# Patient Record
Sex: MALE | Race: OTHER | NOT HISPANIC OR LATINO | URBAN - METROPOLITAN AREA
[De-identification: names, ages, dates, MRNs, and addresses within clinical notes are randomized per-mention and may not be internally consistent; named-entity substitution may affect disease eponyms.]

---

## 2021-10-07 ENCOUNTER — INPATIENT (INPATIENT)
Facility: HOSPITAL | Age: 41
LOS: 0 days | Discharge: HOME | DRG: 812 | End: 2021-10-08
Attending: INTERNAL MEDICINE | Admitting: INTERNAL MEDICINE
Payer: MEDICAID

## 2021-10-07 VITALS
OXYGEN SATURATION: 89 % | TEMPERATURE: 98 F | DIASTOLIC BLOOD PRESSURE: 80 MMHG | RESPIRATION RATE: 17 BRPM | WEIGHT: 195.11 LBS | HEART RATE: 98 BPM | HEIGHT: 69 IN | SYSTOLIC BLOOD PRESSURE: 139 MMHG

## 2021-10-07 DIAGNOSIS — D57.1 SICKLE-CELL DISEASE WITHOUT CRISIS: ICD-10-CM

## 2021-10-07 LAB
ALBUMIN SERPL ELPH-MCNC: 3.3 G/DL — LOW (ref 3.5–5)
ALP SERPL-CCNC: 139 U/L — HIGH (ref 40–120)
ALT FLD-CCNC: 63 U/L DA — HIGH (ref 10–60)
ANION GAP SERPL CALC-SCNC: 6 MMOL/L — SIGNIFICANT CHANGE UP (ref 5–17)
AST SERPL-CCNC: 124 U/L — HIGH (ref 10–40)
BASOPHILS # BLD AUTO: 0.09 K/UL — SIGNIFICANT CHANGE UP (ref 0–0.2)
BASOPHILS NFR BLD AUTO: 1.2 % — SIGNIFICANT CHANGE UP (ref 0–2)
BILIRUB SERPL-MCNC: 7.7 MG/DL — HIGH (ref 0.2–1.2)
BUN SERPL-MCNC: 20 MG/DL — HIGH (ref 7–18)
CALCIUM SERPL-MCNC: 8.7 MG/DL — SIGNIFICANT CHANGE UP (ref 8.4–10.5)
CHLORIDE SERPL-SCNC: 109 MMOL/L — HIGH (ref 96–108)
CO2 SERPL-SCNC: 25 MMOL/L — SIGNIFICANT CHANGE UP (ref 22–31)
CREAT SERPL-MCNC: 1.08 MG/DL — SIGNIFICANT CHANGE UP (ref 0.5–1.3)
EOSINOPHIL # BLD AUTO: 0.22 K/UL — SIGNIFICANT CHANGE UP (ref 0–0.5)
EOSINOPHIL NFR BLD AUTO: 2.9 % — SIGNIFICANT CHANGE UP (ref 0–6)
GLUCOSE SERPL-MCNC: 92 MG/DL — SIGNIFICANT CHANGE UP (ref 70–99)
HCT VFR BLD CALC: 13.1 % — CRITICAL LOW (ref 39–50)
HGB BLD-MCNC: 4.8 G/DL — CRITICAL LOW (ref 13–17)
IMM GRANULOCYTES NFR BLD AUTO: 0.7 % — SIGNIFICANT CHANGE UP (ref 0–1.5)
LYMPHOCYTES # BLD AUTO: 3.03 K/UL — SIGNIFICANT CHANGE UP (ref 1–3.3)
LYMPHOCYTES # BLD AUTO: 39.7 % — SIGNIFICANT CHANGE UP (ref 13–44)
MCHC RBC-ENTMCNC: 35.6 PG — HIGH (ref 27–34)
MCHC RBC-ENTMCNC: 36.6 GM/DL — HIGH (ref 32–36)
MCV RBC AUTO: 97 FL — SIGNIFICANT CHANGE UP (ref 80–100)
MONOCYTES # BLD AUTO: 1.21 K/UL — HIGH (ref 0–0.9)
MONOCYTES NFR BLD AUTO: 15.9 % — HIGH (ref 2–14)
NEUTROPHILS # BLD AUTO: 3.03 K/UL — SIGNIFICANT CHANGE UP (ref 1.8–7.4)
NEUTROPHILS NFR BLD AUTO: 39.6 % — LOW (ref 43–77)
NRBC # BLD: 2 /100 WBCS — HIGH (ref 0–0)
PLATELET # BLD AUTO: 201 K/UL — SIGNIFICANT CHANGE UP (ref 150–400)
POTASSIUM SERPL-MCNC: 4.3 MMOL/L — SIGNIFICANT CHANGE UP (ref 3.5–5.3)
POTASSIUM SERPL-SCNC: 4.3 MMOL/L — SIGNIFICANT CHANGE UP (ref 3.5–5.3)
PROT SERPL-MCNC: 6.9 G/DL — SIGNIFICANT CHANGE UP (ref 6–8.3)
RBC # BLD: 1.35 M/UL — LOW (ref 4.2–5.8)
RBC # FLD: 22.4 % — HIGH (ref 10.3–14.5)
SARS-COV-2 RNA SPEC QL NAA+PROBE: SIGNIFICANT CHANGE UP
SODIUM SERPL-SCNC: 140 MMOL/L — SIGNIFICANT CHANGE UP (ref 135–145)
WBC # BLD: 7.63 K/UL — SIGNIFICANT CHANGE UP (ref 3.8–10.5)
WBC # FLD AUTO: 7.63 K/UL — SIGNIFICANT CHANGE UP (ref 3.8–10.5)

## 2021-10-07 PROCEDURE — 99285 EMERGENCY DEPT VISIT HI MDM: CPT

## 2021-10-07 PROCEDURE — 93970 EXTREMITY STUDY: CPT | Mod: 26

## 2021-10-07 PROCEDURE — 71046 X-RAY EXAM CHEST 2 VIEWS: CPT | Mod: 26

## 2021-10-07 PROCEDURE — 99223 1ST HOSP IP/OBS HIGH 75: CPT

## 2021-10-07 RX ORDER — OXYCODONE HYDROCHLORIDE 5 MG/1
30 TABLET ORAL EVERY 4 HOURS
Refills: 0 | Status: DISCONTINUED | OUTPATIENT
Start: 2021-10-07 | End: 2021-10-08

## 2021-10-07 RX ORDER — OXYCODONE HYDROCHLORIDE 5 MG/1
30 TABLET ORAL ONCE
Refills: 0 | Status: DISCONTINUED | OUTPATIENT
Start: 2021-10-07 | End: 2021-10-07

## 2021-10-07 RX ADMIN — OXYCODONE HYDROCHLORIDE 30 MILLIGRAM(S): 5 TABLET ORAL at 18:35

## 2021-10-07 RX ADMIN — OXYCODONE HYDROCHLORIDE 30 MILLIGRAM(S): 5 TABLET ORAL at 18:05

## 2021-10-07 RX ADMIN — OXYCODONE HYDROCHLORIDE 30 MILLIGRAM(S): 5 TABLET ORAL at 22:06

## 2021-10-07 NOTE — H&P ADULT - NSHPREVIEWOFSYSTEMS_GEN_ALL_CORE
REVIEW OF SYSTEMS:  CONSTITUTIONAL: No fever, weight loss, or fatigue  RESPIRATORY: No cough, wheezing, chills or hemoptysis; No shortness of breath  CARDIOVASCULAR: No chest pain, palpitations, dizziness, or leg swelling  GASTROINTESTINAL: No abdominal pain. No nausea, vomiting, or hematemesis; No diarrhea or constipation. No melena or hematochezia.  GENITOURINARY: No dysuria or hematuria, urinary frequency  NEUROLOGICAL: No headaches, memory loss, loss of strength, numbness, or tremors  SKIN: No itching, burning, rashes, or lesions REVIEW OF SYSTEMS:  CONSTITUTIONAL: No fever, weight loss, + fatigue  RESPIRATORY: No cough, wheezing, chills or hemoptysis; No shortness of breath  CARDIOVASCULAR: No chest pain, palpitations, dizziness, or leg swelling  GASTROINTESTINAL: No abdominal pain. No nausea, vomiting, or hematemesis; No diarrhea or constipation. No melena or hematochezia.  GENITOURINARY: No dysuria or hematuria, urinary frequency  NEUROLOGICAL: No headaches, memory loss, loss of strength, numbness, or tremors  SKIN: + jaundice. No itching, burning, rashes, or lesions

## 2021-10-07 NOTE — H&P ADULT - PROBLEM SELECTOR PLAN 1
sent in for low Hb, in ED was 4.8 w/ diffuse jaundice, icterus  bili 7.7  Trop negative  - transfuse 2-3 units. 1 unit PRBC in ED   - pre-medicate: IV tylenol, benadryl IVP 50 and decadron 8 IVP  - trend Hb post transfusions

## 2021-10-07 NOTE — ED PROVIDER NOTE - CLINICAL SUMMARY MEDICAL DECISION MAKING FREE TEXT BOX
No urinary symptoms or CVAT to suggest pyelo/stone. No fever. Character and appearance low suspicion for dissection. No abd pain/tenderness with low suspicion for acute process. Symm LE edema, lungs clear and no SOB/WOB to suggest pulm edema or DVT/PE. No urinary symptoms or CVAT to suggest pyelo/stone. No fever. Character and appearance low suspicion for dissection. No abd pain/tenderness with low suspicion for acute process. Symm LE edema, lungs clear and no SOB/WOB to suggest pulm edema or DVT/PE.      D/w Dr. Jordan on the phone, in light of 4.8 still wants only 2u PRBC as pt has this at baseline, also bili (8.6 in August)/other labs at baseline, confirms dose of oxycodone though q4-6 hrs. No urinary symptoms or CVAT to suggest pyelo/stone. No fever. Character and appearance low suspicion for dissection. No abd pain/tenderness with low suspicion for acute process. Symm LE edema, lungs clear and no SOB/WOB to suggest pulm edema or DVT/PE. No e/o DVT on US. No CP/SOB/cough/fever and no e/o acute chest. Hypoxia may be 2/2 anemia. No acute bleeding. D/w Dr. Jordan on the phone, in light of 4.8 still wants only 2u PRBC as pt has this at baseline, also bili (8.6 in August)/other labs at baseline, confirms dose of oxycodone though q4-6 hrs. Patient well appearing, hemodynamically stable, walking around ED, no WOB. Awaiting prolonged blood 2/2 ab's. Admitted to internal medicine for further monitoring, w/u, and care.

## 2021-10-07 NOTE — ED PROVIDER NOTE - CARE PLAN
1 Principal Discharge DX:	Sickle cell anemia  Secondary Diagnosis:	Back pain   Principal Discharge DX:	Sickle cell anemia  Secondary Diagnosis:	Chronic midline low back pain  Secondary Diagnosis:	Hypoxia

## 2021-10-07 NOTE — ED PROVIDER NOTE - OBJECTIVE STATEMENT
41yoM with h/o HbSS, presents with rx by Dr. Adrian Jordan with rx for 2u PRBC and premedication with Tylenol, benadryl, decadron (which I viewed at bedside). Pt confirms he has h/o multiple blood transfusions 2/2 his sickle cell dz. Reports midline low back pain, entirely identical to his usual chronic pain for which he takes 30mg oxycodone q3hrs. Denies fever, v/d, abd pain, urinary symptoms, fall/trauma, bleeding source, and all other symptoms.

## 2021-10-07 NOTE — H&P ADULT - HISTORY OF PRESENT ILLNESS
42 yo M Hx sickle cell anemia, anemia requiring blood transfusions (last in April 2021) and Gout who was sent in by Dr. Jordan for low blood Hb level. Patient reports few days of fatigue and shortness of breath on exertion. He waited till his visit to Dr. Jordan to be evaluated for his symptoms. Reports that he usually gets monthly transfusions but has been transfusion - free over the summer since April. Takes Allopurinol, Colchicine, Folic Acid, Oxycodone, Omeprazole and is planned to start "Oxbrita" soon. Denies any infectious symptoms or blood in stool or urine.

## 2021-10-07 NOTE — H&P ADULT - NSHPPHYSICALEXAM_GEN_ALL_CORE
PHYSICAL EXAMINATION:  GENERAL: NAD, well built  HEAD:  Atraumatic, Normocephalic  EYES:  conjunctiva and sclera clear  NECK: Supple, No JVD, Normal thyroid  CHEST/LUNG: Clear to auscultation. Clear to percussion bilaterally; No rales, rhonchi, wheezing, or rubs  HEART: Regular rate and rhythm; No murmurs, rubs, or gallops  ABDOMEN: Soft, Nontender, Nondistended; Bowel sounds present  NERVOUS SYSTEM:  Alert & Oriented X3,    EXTREMITIES:  2+ Peripheral Pulses, No clubbing, cyanosis, or edema  SKIN: warm dry PHYSICAL EXAMINATION:  GENERAL: NAD, on 4L NC  HEAD:  Atraumatic, Normocephalic  EYES:  conjunctiva and sclera icteric   NECK: Supple, No JVD, Normal thyroid  CHEST/LUNG: Clear to auscultation. Clear to percussion bilaterally; No rales, rhonchi, wheezing, or rubs  HEART: tachy rate and rhythm; No rubs, or gallops  ABDOMEN: Soft, Nontender, Nondistended; Bowel sounds present  NERVOUS SYSTEM:  Alert & Oriented X3,    EXTREMITIES:  2+ Peripheral Pulses, No clubbing, cyanosis, b/l LE edema  SKIN: warm dry, jaundiced

## 2021-10-07 NOTE — H&P ADULT - NSHPLABSRESULTS_GEN_ALL_CORE
LABS:                        4.8    7.63  )-----------( 201      ( 07 Oct 2021 18:02 )             13.1     10-07    140  |  109<H>  |  20<H>  ----------------------------<  92  4.3   |  25  |  1.08    Ca    8.7      07 Oct 2021 18:02    TPro  6.9  /  Alb  3.3<L>  /  TBili  7.7<H>  /  DBili  x   /  AST  124<H>  /  ALT  63<H>  /  AlkPhos  139<H>  10-07        LIVER FUNCTIONS - ( 07 Oct 2021 18:02 )  Alb: 3.3 g/dL / Pro: 6.9 g/dL / ALK PHOS: 139 U/L / ALT: 63 U/L DA / AST: 124 U/L / GGT: x LABS:                        4.8    7.63  )-----------( 201      ( 07 Oct 2021 18:02 )             13.1     10-07    140  |  109<H>  |  20<H>  ----------------------------<  92  4.3   |  25  |  1.08    Ca    8.7      07 Oct 2021 18:02    TPro  6.9  /  Alb  3.3<L>  /  TBili  7.7<H>  /  DBili  x   /  AST  124<H>  /  ALT  63<H>  /  AlkPhos  139<H>  10-07    LIVER FUNCTIONS - ( 07 Oct 2021 18:02 )  Alb: 3.3 g/dL / Pro: 6.9 g/dL / ALK PHOS: 139 U/L / ALT: 63 U/L DA / AST: 124 U/L / GGT: x

## 2021-10-07 NOTE — ED ADULT NURSE NOTE - OBJECTIVE STATEMENT
Pt arrived from home , drove self for low HGB  Pt appears jaundiced , c/o pain all over the body , states has a Hx of sickle cell

## 2021-10-07 NOTE — ED PROVIDER NOTE - PHYSICAL EXAMINATION
Afebrile, hemodynamically stable, saturating well  NAD, well appearing, sitting comfortably in bed, no WOB, speaking full sentences  Head NCAT  EOMI grossly, icteric  MMM  No JVD  RRR, nml S1/S2, no m/r/g  Lungs CTAB, no w/r/r  Abd soft, NT, ND, nml BS, no rebound or guarding  No back stepoff/deformity/erythema  AAO, CN's 3-12 grossly intact  RICHARDSON spontaneously, b/l symm LE edema baseline per pt  Skin warm, well perfused, no rashes or hives

## 2021-10-07 NOTE — ED PROVIDER NOTE - SECONDARY DIAGNOSIS.
Preceptor attestation:  Patient seen and discussed with the resident. Assessment and plan reviewed with resident and agreed upon.  Supervising physician: Amrit Logan  [facility]       Back pain Hypoxia Chronic midline low back pain

## 2021-10-07 NOTE — H&P ADULT - ASSESSMENT
40 yo M Hx sickle cell anemia, anemia requiring blood transfusions (last in April 2021) and Gout who was sent in by Dr. Jordan for severe symptomatic anemia requiring transfusion.    [facility]  Phone: [phone]

## 2021-10-07 NOTE — H&P ADULT - ATTENDING COMMENTS
Patient is a 41 year old male with a PMH of Sickle Cell Anemia, Gout who presented with a chief complaint of low hemoglobin.  Patient states that after being informed by his Hematologist (Dr. Jordan) that his hemoglobin was low, he was advised to proceed to [facility] ED for a blood transfusion.    T(C): 36.7 (10-08-21 @ 00:13), Max: 36.8 (10-07-21 @ 20:17)  T(F): 98 (10-08-21 @ 00:13), Max: 98.2 (10-07-21 @ 20:17)  HR: 87 (10-08-21 @ 00:13) (81 - 98)  BP: 124/76 (10-08-21 @ 00:13) (121/73 - 139/80)  RR: 18 (10-08-21 @ 00:13) (17 - 18)  SpO2: 96% (10-08-21 @ 00:13) (88% - 96%)  Wt(kg): --    P/E: As above MAR    A/P:    Severe Anemia:  -Hgb= 4.8  -Will order Tylenol, Benadryl prior to transfusion  -2units PRBC pending in ED  -Will continue to monitor CBC  -Patient will need to follow-up with Hematology as an outpatient after discharge    Sickle Cell Anemia:  -Pain control as necessary without sedating  -Will resume patient's Folic Acid  -Patient does not want to receive COVID vaccine    Elevated Alk Phos and Transaminitis:  -Will send Hepatitis Panel    Hyperbilirubinemia:  -Can consider sending Bilirubin, Total, Direct and Indirect    Gout:  -Will resume patient's home medications    Hypoalbuminemia:  -Nutrition Consult    GI/DVT PPx:  -Intermittent Compression Stockings  -PPI Patient is a 41 year old male with a PMH of Sickle Cell Anemia, Gout, h/o COVID Infection 3/2020 who presented with a chief complaint of low hemoglobin.  Patient states that after being informed by his Hematologist (Dr. Jordan) that his hemoglobin was low, he was advised to proceed to [facility] ED for a blood transfusion.    T(C): 36.7 (10-08-21 @ 00:13), Max: 36.8 (10-07-21 @ 20:17)  T(F): 98 (10-08-21 @ 00:13), Max: 98.2 (10-07-21 @ 20:17)  HR: 87 (10-08-21 @ 00:13) (81 - 98)  BP: 124/76 (10-08-21 @ 00:13) (121/73 - 139/80)  RR: 18 (10-08-21 @ 00:13) (17 - 18)  SpO2: 96% (10-08-21 @ 00:13) (88% - 96%)  Wt(kg): --    P/E: As above MAR    A/P:    Severe Anemia:  -Hgb= 4.8  -Will order Tylenol, Benadryl prior to transfusion  -2units PRBC pending in ED  -Will continue to monitor CBC  -Patient will need to follow-up with Hematology as an outpatient after discharge    Sickle Cell Anemia:  -Pain control as necessary without sedating  -Will resume patient's Folic Acid  -Patient does not want to receive COVID vaccine    Elevated Alk Phos and Transaminitis:  -Will send Hepatitis Panel    Hyperbilirubinemia:  -Can consider sending Bilirubin, Total, Direct and Indirect    Gout:  -Will resume patient's home medications    Hypoalbuminemia:  -Nutrition Consult    GI/DVT PPx:  -Intermittent Compression Stockings  -PPI

## 2021-10-07 NOTE — H&P ADULT - PROBLEM SELECTOR PLAN 2
Levi DURHAM followed by Dr. Jordan  confirm home meds  - cont narcotic oxy home med for pain control  - consider dilaudid for breakthrough  - cont FA supplement (started 2mg PO QD as patient unsure dosing)

## 2021-10-08 ENCOUNTER — TRANSCRIPTION ENCOUNTER (OUTPATIENT)
Age: 41
End: 2021-10-08

## 2021-10-08 VITALS
OXYGEN SATURATION: 96 % | TEMPERATURE: 98 F | RESPIRATION RATE: 18 BRPM | SYSTOLIC BLOOD PRESSURE: 126 MMHG | HEART RATE: 76 BPM | DIASTOLIC BLOOD PRESSURE: 67 MMHG

## 2021-10-08 DIAGNOSIS — M10.9 GOUT, UNSPECIFIED: ICD-10-CM

## 2021-10-08 DIAGNOSIS — D64.9 ANEMIA, UNSPECIFIED: ICD-10-CM

## 2021-10-08 DIAGNOSIS — Z90.49 ACQUIRED ABSENCE OF OTHER SPECIFIED PARTS OF DIGESTIVE TRACT: Chronic | ICD-10-CM

## 2021-10-08 DIAGNOSIS — F12.10 CANNABIS ABUSE, UNCOMPLICATED: ICD-10-CM

## 2021-10-08 DIAGNOSIS — M54.9 DORSALGIA, UNSPECIFIED: ICD-10-CM

## 2021-10-08 DIAGNOSIS — D57.1 SICKLE-CELL DISEASE WITHOUT CRISIS: ICD-10-CM

## 2021-10-08 DIAGNOSIS — Z87.39 PERSONAL HISTORY OF OTHER DISEASES OF THE MUSCULOSKELETAL SYSTEM AND CONNECTIVE TISSUE: ICD-10-CM

## 2021-10-08 DIAGNOSIS — Z29.9 ENCOUNTER FOR PROPHYLACTIC MEASURES, UNSPECIFIED: ICD-10-CM

## 2021-10-08 LAB
ALBUMIN SERPL ELPH-MCNC: 3.4 G/DL — LOW (ref 3.5–5)
ALP SERPL-CCNC: 135 U/L — HIGH (ref 40–120)
ALT FLD-CCNC: 62 U/L DA — HIGH (ref 10–60)
ANION GAP SERPL CALC-SCNC: 6 MMOL/L — SIGNIFICANT CHANGE UP (ref 5–17)
ANISOCYTOSIS BLD QL: SIGNIFICANT CHANGE UP
AST SERPL-CCNC: 125 U/L — HIGH (ref 10–40)
BASOPHILS # BLD AUTO: 0 K/UL — SIGNIFICANT CHANGE UP (ref 0–0.2)
BASOPHILS NFR BLD AUTO: 0 % — SIGNIFICANT CHANGE UP (ref 0–2)
BILIRUB DIRECT SERPL-MCNC: 4.1 MG/DL — HIGH (ref 0–0.2)
BILIRUB INDIRECT FLD-MCNC: 3.7 MG/DL — HIGH (ref 0.2–1)
BILIRUB SERPL-MCNC: 7.8 MG/DL — HIGH (ref 0.2–1.2)
BILIRUB SERPL-MCNC: 7.9 MG/DL — HIGH (ref 0.2–1.2)
BUN SERPL-MCNC: 23 MG/DL — HIGH (ref 7–18)
CALCIUM SERPL-MCNC: 9.4 MG/DL — SIGNIFICANT CHANGE UP (ref 8.4–10.5)
CHLORIDE SERPL-SCNC: 110 MMOL/L — HIGH (ref 96–108)
CO2 SERPL-SCNC: 24 MMOL/L — SIGNIFICANT CHANGE UP (ref 22–31)
CREAT SERPL-MCNC: 0.87 MG/DL — SIGNIFICANT CHANGE UP (ref 0.5–1.3)
ELLIPTOCYTES BLD QL SMEAR: SLIGHT — SIGNIFICANT CHANGE UP
EOSINOPHIL # BLD AUTO: 0 K/UL — SIGNIFICANT CHANGE UP (ref 0–0.5)
EOSINOPHIL NFR BLD AUTO: 0 % — SIGNIFICANT CHANGE UP (ref 0–6)
GIANT PLATELETS BLD QL SMEAR: PRESENT — SIGNIFICANT CHANGE UP
GLUCOSE SERPL-MCNC: 156 MG/DL — HIGH (ref 70–99)
HCT VFR BLD CALC: 19.8 % — CRITICAL LOW (ref 39–50)
HGB BLD-MCNC: 7.1 G/DL — LOW (ref 13–17)
LG PLATELETS BLD QL AUTO: SLIGHT — SIGNIFICANT CHANGE UP
LYMPHOCYTES # BLD AUTO: 0.73 K/UL — LOW (ref 1–3.3)
LYMPHOCYTES # BLD AUTO: 20 % — SIGNIFICANT CHANGE UP (ref 13–44)
MACROCYTES BLD QL: SLIGHT — SIGNIFICANT CHANGE UP
MAGNESIUM SERPL-MCNC: 1.9 MG/DL — SIGNIFICANT CHANGE UP (ref 1.6–2.6)
MANUAL SMEAR VERIFICATION: SIGNIFICANT CHANGE UP
MCHC RBC-ENTMCNC: 33.3 PG — SIGNIFICANT CHANGE UP (ref 27–34)
MCHC RBC-ENTMCNC: 35.9 GM/DL — SIGNIFICANT CHANGE UP (ref 32–36)
MCV RBC AUTO: 93 FL — SIGNIFICANT CHANGE UP (ref 80–100)
MONOCYTES # BLD AUTO: 0.07 K/UL — SIGNIFICANT CHANGE UP (ref 0–0.9)
MONOCYTES NFR BLD AUTO: 2 % — SIGNIFICANT CHANGE UP (ref 2–14)
NEUTROPHILS # BLD AUTO: 2.81 K/UL — SIGNIFICANT CHANGE UP (ref 1.8–7.4)
NEUTROPHILS NFR BLD AUTO: 77 % — SIGNIFICANT CHANGE UP (ref 43–77)
NRBC # BLD: 4 /100 — HIGH (ref 0–0)
OVALOCYTES BLD QL SMEAR: SLIGHT — SIGNIFICANT CHANGE UP
PHOSPHATE SERPL-MCNC: 2.2 MG/DL — LOW (ref 2.5–4.5)
PLAT MORPH BLD: NORMAL — SIGNIFICANT CHANGE UP
PLATELET # BLD AUTO: 226 K/UL — SIGNIFICANT CHANGE UP (ref 150–400)
PLATELET CLUMP BLD QL SMEAR: SLIGHT
POIKILOCYTOSIS BLD QL AUTO: SIGNIFICANT CHANGE UP
POLYCHROMASIA BLD QL SMEAR: SLIGHT — SIGNIFICANT CHANGE UP
POTASSIUM SERPL-MCNC: 4.6 MMOL/L — SIGNIFICANT CHANGE UP (ref 3.5–5.3)
POTASSIUM SERPL-SCNC: 4.6 MMOL/L — SIGNIFICANT CHANGE UP (ref 3.5–5.3)
PROT SERPL-MCNC: 7.3 G/DL — SIGNIFICANT CHANGE UP (ref 6–8.3)
RBC # BLD: 2.13 M/UL — LOW (ref 4.2–5.8)
RBC # FLD: 18.9 % — HIGH (ref 10.3–14.5)
RBC BLD AUTO: ABNORMAL
SICKLE CELLS BLD QL SMEAR: SIGNIFICANT CHANGE UP
SODIUM SERPL-SCNC: 140 MMOL/L — SIGNIFICANT CHANGE UP (ref 135–145)
VARIANT LYMPHS # BLD: 1 % — SIGNIFICANT CHANGE UP (ref 0–6)
WBC # BLD: 3.65 K/UL — LOW (ref 3.8–10.5)
WBC # FLD AUTO: 3.65 K/UL — LOW (ref 3.8–10.5)

## 2021-10-08 PROCEDURE — 84100 ASSAY OF PHOSPHORUS: CPT

## 2021-10-08 PROCEDURE — 99285 EMERGENCY DEPT VISIT HI MDM: CPT

## 2021-10-08 PROCEDURE — 99222 1ST HOSP IP/OBS MODERATE 55: CPT

## 2021-10-08 PROCEDURE — 93970 EXTREMITY STUDY: CPT

## 2021-10-08 PROCEDURE — 82248 BILIRUBIN DIRECT: CPT

## 2021-10-08 PROCEDURE — 83735 ASSAY OF MAGNESIUM: CPT

## 2021-10-08 PROCEDURE — 86922 COMPATIBILITY TEST ANTIGLOB: CPT

## 2021-10-08 PROCEDURE — 87635 SARS-COV-2 COVID-19 AMP PRB: CPT

## 2021-10-08 PROCEDURE — 85025 COMPLETE CBC W/AUTO DIFF WBC: CPT

## 2021-10-08 PROCEDURE — 86901 BLOOD TYPING SEROLOGIC RH(D): CPT

## 2021-10-08 PROCEDURE — 36430 TRANSFUSION BLD/BLD COMPNT: CPT

## 2021-10-08 PROCEDURE — 83880 ASSAY OF NATRIURETIC PEPTIDE: CPT

## 2021-10-08 PROCEDURE — 86850 RBC ANTIBODY SCREEN: CPT

## 2021-10-08 PROCEDURE — 86900 BLOOD TYPING SEROLOGIC ABO: CPT

## 2021-10-08 PROCEDURE — 71046 X-RAY EXAM CHEST 2 VIEWS: CPT

## 2021-10-08 PROCEDURE — 36415 COLL VENOUS BLD VENIPUNCTURE: CPT

## 2021-10-08 PROCEDURE — 80053 COMPREHEN METABOLIC PANEL: CPT

## 2021-10-08 PROCEDURE — P9040: CPT

## 2021-10-08 PROCEDURE — 84484 ASSAY OF TROPONIN QUANT: CPT

## 2021-10-08 PROCEDURE — 82247 BILIRUBIN TOTAL: CPT

## 2021-10-08 RX ORDER — OXYCODONE HYDROCHLORIDE 5 MG/1
1 TABLET ORAL
Qty: 0 | Refills: 0 | DISCHARGE
Start: 2021-10-08

## 2021-10-08 RX ORDER — OXYCODONE HYDROCHLORIDE 5 MG/1
30 TABLET ORAL EVERY 4 HOURS
Refills: 0 | Status: DISCONTINUED | OUTPATIENT
Start: 2021-10-08 | End: 2021-10-08

## 2021-10-08 RX ORDER — DEXAMETHASONE 0.5 MG/5ML
8 ELIXIR ORAL ONCE
Refills: 0 | Status: DISCONTINUED | OUTPATIENT
Start: 2021-10-08 | End: 2021-10-08

## 2021-10-08 RX ORDER — ACETAMINOPHEN 500 MG
1000 TABLET ORAL ONCE
Refills: 0 | Status: COMPLETED | OUTPATIENT
Start: 2021-10-08 | End: 2021-10-08

## 2021-10-08 RX ORDER — SENNA PLUS 8.6 MG/1
2 TABLET ORAL AT BEDTIME
Refills: 0 | Status: DISCONTINUED | OUTPATIENT
Start: 2021-10-08 | End: 2021-10-08

## 2021-10-08 RX ORDER — PANTOPRAZOLE SODIUM 20 MG/1
40 TABLET, DELAYED RELEASE ORAL
Refills: 0 | Status: DISCONTINUED | OUTPATIENT
Start: 2021-10-08 | End: 2021-10-08

## 2021-10-08 RX ORDER — ALLOPURINOL 300 MG
100 TABLET ORAL DAILY
Refills: 0 | Status: DISCONTINUED | OUTPATIENT
Start: 2021-10-08 | End: 2021-10-08

## 2021-10-08 RX ORDER — SENNA PLUS 8.6 MG/1
2 TABLET ORAL
Qty: 0 | Refills: 0 | DISCHARGE
Start: 2021-10-08

## 2021-10-08 RX ORDER — POLYETHYLENE GLYCOL 3350 17 G/17G
17 POWDER, FOR SOLUTION ORAL DAILY
Refills: 0 | Status: DISCONTINUED | OUTPATIENT
Start: 2021-10-08 | End: 2021-10-08

## 2021-10-08 RX ORDER — DIPHENHYDRAMINE HCL 50 MG
50 CAPSULE ORAL ONCE
Refills: 0 | Status: COMPLETED | OUTPATIENT
Start: 2021-10-08 | End: 2021-10-08

## 2021-10-08 RX ORDER — DEXAMETHASONE 0.5 MG/5ML
6 ELIXIR ORAL ONCE
Refills: 0 | Status: COMPLETED | OUTPATIENT
Start: 2021-10-08 | End: 2021-10-08

## 2021-10-08 RX ORDER — FOLIC ACID 0.8 MG
2 TABLET ORAL
Qty: 0 | Refills: 0 | DISCHARGE
Start: 2021-10-08

## 2021-10-08 RX ORDER — FOLIC ACID 0.8 MG
2 TABLET ORAL DAILY
Refills: 0 | Status: DISCONTINUED | OUTPATIENT
Start: 2021-10-08 | End: 2021-10-08

## 2021-10-08 RX ORDER — LIDOCAINE 4 G/100G
1 CREAM TOPICAL DAILY
Refills: 0 | Status: DISCONTINUED | OUTPATIENT
Start: 2021-10-08 | End: 2021-10-08

## 2021-10-08 RX ORDER — POLYETHYLENE GLYCOL 3350 17 G/17G
17 POWDER, FOR SOLUTION ORAL
Qty: 0 | Refills: 0 | DISCHARGE
Start: 2021-10-08

## 2021-10-08 RX ORDER — COLCHICINE 0.6 MG
0.6 TABLET ORAL DAILY
Refills: 0 | Status: DISCONTINUED | OUTPATIENT
Start: 2021-10-08 | End: 2021-10-08

## 2021-10-08 RX ADMIN — Medication 6 MILLIGRAM(S): at 01:36

## 2021-10-08 RX ADMIN — Medication 1000 MILLIGRAM(S): at 01:40

## 2021-10-08 RX ADMIN — Medication 50 MILLIGRAM(S): at 00:42

## 2021-10-08 RX ADMIN — OXYCODONE HYDROCHLORIDE 30 MILLIGRAM(S): 5 TABLET ORAL at 03:11

## 2021-10-08 RX ADMIN — POLYETHYLENE GLYCOL 3350 17 GRAM(S): 17 POWDER, FOR SOLUTION ORAL at 12:26

## 2021-10-08 RX ADMIN — OXYCODONE HYDROCHLORIDE 30 MILLIGRAM(S): 5 TABLET ORAL at 09:27

## 2021-10-08 RX ADMIN — OXYCODONE HYDROCHLORIDE 30 MILLIGRAM(S): 5 TABLET ORAL at 00:54

## 2021-10-08 RX ADMIN — Medication 400 MILLIGRAM(S): at 00:42

## 2021-10-08 RX ADMIN — Medication 2 MILLIGRAM(S): at 12:26

## 2021-10-08 RX ADMIN — LIDOCAINE 1 PATCH: 4 CREAM TOPICAL at 12:25

## 2021-10-08 RX ADMIN — OXYCODONE HYDROCHLORIDE 30 MILLIGRAM(S): 5 TABLET ORAL at 04:00

## 2021-10-08 NOTE — DISCHARGE NOTE PROVIDER - NSDCMRMEDTOKEN_GEN_ALL_CORE_FT
folic acid 1 mg oral tablet: 2 tab(s) orally once a day  oxyCODONE 30 mg oral tablet: 1 tab(s) orally every 4 hours, As needed, Severe Pain (7 - 10)   allopurinol 100 mg oral tablet: orally once a day  colchicine 0.6 mg oral tablet: 1 tab(s) orally once a day  deferasirox 360 mg oral tablet: 1 cap(s) orally 2 times a day  folic acid 1 mg oral tablet: 2 tab(s) orally once a day  omeprazole 40 mg oral delayed release capsule: 1 cap(s) orally once a day  oxyCODONE 30 mg oral tablet: 1 tab(s) orally every 4 hours, As needed, Severe Pain (7 - 10)  polyethylene glycol 3350 oral powder for reconstitution: 17 gram(s) orally once a day  senna oral tablet: 2 tab(s) orally once a day (at bedtime)

## 2021-10-08 NOTE — DISCHARGE NOTE PROVIDER - NSDCCPCAREPLAN_GEN_ALL_CORE_FT
PRINCIPAL DISCHARGE DIAGNOSIS  Diagnosis: Sickle cell anemia  Assessment and Plan of Treatment: A sickle cell crisis is a painful episode that occurs in people who have sickle cell anemia. It happens when sickle-shaped red blood cells (RBCs) block blood vessels. Blood and oxygen cannot get to tissues, causing pain. A sickle cell crisis can also damage your tissues and cause organ failure, such liver or kidney failure. A sickle cell crisis can become life-threatening.   Becuase of your sickle cell you have an abnormally low hemoglobin level.  You were admitted with a level of 4.8, requiring you to receive two units of red blood cells.   Your hemoglobin is now 7.1, which is considered okay for you as per your hematologist.    you should return to the ED if you experience any dizziness, lightheadedness, Shortness of breathe, a fever greater that 100.4, severe headache or an erection lasting longer than 4 hours.  These signs could      SECONDARY DISCHARGE DIAGNOSES  Diagnosis: Chronic midline low back pain  Assessment and Plan of Treatment: You have chronic lower back pain.  This is most likely due to your sickle cell anemia.   Your pain has been stable during your admission.   You can continue with your regular pain management regimen.  You should return to the ED with any acute onset of worsening pain.    Diagnosis: Gout  Assessment and Plan of Treatment: You have a diagnosis of gout.  Gout is a form of arthritis that causes severe joint pain, redness, swelling, and stiffness. Acute gout pain starts suddenly, gets worse quickly, and stops on its own. Acute gout can become chronic and cause permanent damage to the joints.   You can continue to take your allopurinol and colchicine as ordered.    Diagnosis: Hypoxia  Assessment and Plan of Treatment:

## 2021-10-08 NOTE — DISCHARGE NOTE PROVIDER - CARE PROVIDER_API CALL
Chuck Jordan  INTERNAL MEDICINE  [address] [place] [address]  Phone: [phone]  Fax: [phone]  Follow Up Time:    Chuck Jordan  INTERNAL MEDICINE  [address] [place] [address]  Phone: [phone]  Fax: [phone]  Follow Up Time: 1 week

## 2021-10-08 NOTE — CONSULT NOTE ADULT - SUBJECTIVE AND OBJECTIVE BOX
Source of information: TOLU VARGAS, Chart review  Patient language: English  : n/a    HPI:  42 yo M Hx sickle cell anemia, anemia requiring blood transfusions (last in 2021) and Gout who was sent in by Dr. Jordan for low blood Hb level. Patient reports few days of fatigue and shortness of breath on exertion. He waited till his visit to Dr. Jordan to be evaluated for his symptoms. Reports that he usually gets monthly transfusions but has been transfusion - free over the summer since April. Takes Allopurinol, Colchicine, Folic Acid, Oxycodone, Omeprazole and is planned to start "Oxbrita" soon. Denies any infectious symptoms or blood in stool or urine. (07 Oct 2021 23:32)      Patient is a 41y old  Male with PMH sickle cell anemia, anemia requiring blood transfusions (last in 2021), Gout, chronic back pain and marijuana use who was sent by Dr. Jordan for low Hb. On admission, H/H 4.8/13.1 s/p 2 units PRBCs. ProBNP 295. Awaiting cbc results. Imaging negative for b/l DVT. Pain consulted for chronic back pain, sickle cell pain. Pt seen and examined at bedside. Pt laying in bed, reports low back pain, greatest over right lower back pain score 4/10 and tolerable  SCALE USED: (1-10 VNRS). Pt describes pain as aching, throbbing radiating to right side body, alleviated by pain medication, exacerbated by movement. Pt also reports slight frontal headache, improving since blood transfusion. Reports taking oxycodone 30mg PO q3-4hr for pain at home. States he has been on oxycodone since he was a teenager. Pt tolerating PO diet. Denies lethargy, nausea, vomiting, constipation, itchiness. Reports lethargy has resolved. Reports last BM 10/6. Patient stated goal for pain control: to be able to take deep breaths, get out of bed to chair and ambulate with tolerable pain control. Pt is not vaccinated against COVID19 and is refusing at this time. Reports both his parents passed away from COVID in May 2021.     PAST MEDICAL & SURGICAL HISTORY:  Sickle cell anemia    Gout    Anemia requiring transfusions    Marijuana use    cholecystectomy over 20 years ago    FAMILY HISTORY: Both parents sickle cell disease carriers, . Passed away from COVID19 in May 2021.       Social History:   [X] Denies ETOH use [X] + Marijuana use    Allergies    hydroxyurea (Other)  penicillin (Pruritus)    MEDICATIONS  (STANDING):  folic acid 2 milliGRAM(s) Oral daily    Vital Signs Last 24 Hrs  T(C): 36.4 (08 Oct 2021 09:10), Max: 36.8 (07 Oct 2021 20:17)  T(F): 97.5 (08 Oct 2021 09:10), Max: 98.2 (07 Oct 2021 20:17)  HR: 85 (08 Oct 2021 09:10) (81 - 98)  BP: 131/70 (08 Oct 2021 09:10) (117/68 - 139/80)  BP(mean): --  RR: 18 (08 Oct 2021 09:10) (17 - 18)  SpO2: 94% (08 Oct 2021 09:10) (88% - 96%)    LABS: Reviewed.                          4.8    7.63  )-----------( 201      ( 07 Oct 2021 18:02 )             13.1     10-07    140  |  109<H>  |  20<H>  ----------------------------<  92  4.3   |  25  |  1.08    Ca    8.7      07 Oct 2021 18:02    TPro  6.9  /  Alb  3.3<L>  /  TBili  7.7<H>  /  DBili  x   /  AST  124<H>  /  ALT  63<H>  /  AlkPhos  139<H>  10-07      LIVER FUNCTIONS - ( 07 Oct 2021 18:02 )  Alb: 3.3 g/dL / Pro: 6.9 g/dL / ALK PHOS: 139 U/L / ALT: 63 U/L DA / AST: 124 U/L / GGT: x             CAPILLARY BLOOD GLUCOSE        COVID-19 PCR: NotDetec (07 Oct 2021 18:02)      Radiology: Reviewed.   < from: US Duplex Venous Lower Ext Complete, Bilateral (10.07.21 @ 20:47) >    EXAM:  US DPLX LWR EXT VEINS COMPL BI                            PROCEDURE DATE:  10/07/2021          INTERPRETATION:  CLINICAL INFORMATION: Leg swelling    COMPARISON: None available.    TECHNIQUE: Duplex sonography of the BILATERAL LOWER extremity veins with color and spectral Doppler, with and without compression.    FINDINGS:    RIGHT:  Normal compressibility of the RIGHT common femoral, femoral and popliteal veins.  Doppler examination shows normal spontaneous and phasic flow.  No RIGHT calf vein thrombosis is detected.    LEFT:  Normal compressibility of the LEFT common femoral, femoral and popliteal veins.  Doppler examination shows normal spontaneous and phasic flow.  No LEFT calf vein thrombosis is detected.    IMPRESSION:  No evidence of deep venous thrombosis in either lower extremity.          --- End of Report ---            MEKHI ADHIKARI MD; Attending Radiologist  This document has been electronically signed. Oct  7 2021  8:48PM    < end of copied text >      ORT Score -   Family Hx of substance abuse	Female	      Male  Alcohol 	                                           1                     3  Illegal drugs	                                   2                     3  Rx drugs                                           4 	                  4  Personal Hx of substance abuse		  Alcohol 	                                          3	                  3  Illegal drugs                                     4	                  4  Rx drugs                                            5 	                  5  Age between 16- 45 years	           1                     1  hx preadolescent sexual abuse	   3 	                  0  Psychological disease		  ADD, OCD, bipolar, schizophrenia   2	          2  Depression                                           1 	          1  Total: 5  a score of 3 or lower indicates low risk for opioid abuse		  a score of 4-7 indicates moderate risk for opioid abuse		  a score of 8 or higher indicates high risk for opioid abuse  	  REVIEW OF SYSTEMS:  CONSTITUTIONAL: No fever + fatigue (resolved)   HEENT:  + mild frontal headache. No dizziness, difficulty hearing, no change in vision  NECK: No pain or stiffness  RESPIRATORY: No cough, wheezing, chills or hemoptysis; No shortness of breath (reports SOB resolved)  CARDIOVASCULAR: No chest pain, palpitations, dizziness, + b/l ankle swelling (improving)  GASTROINTESTINAL: No loss of appetite, decreased PO intake. No abdominal or epigastric pain. No nausea, vomiting; No diarrhea or constipation.   GENITOURINARY: No dysuria, frequency, hematuria, retention or incontinence  MUSCULOSKELETAL: + chronic back pain;  no upper or lower motor strength weakness, no saddle anesthesia, bowel/bladder incontinence, no falls   NEURO: No numbness/tingling b/l LE, No weakness  PSYCHIATRIC: No depression, anxiety or difficulty sleeping    PHYSICAL EXAM:  GENERAL:  Alert & Oriented X4, cooperative, NAD, Good concentration. Speech is clear.   RESPIRATORY: Respirations even and unlabored. Clear to auscultation bilaterally; No rales, rhonchi, wheezing, or rubs  CARDIOVASCULAR: Normal S1/S2, regular rate and rhythm; No murmurs, rubs, or gallops. No JVD.   GASTROINTESTINAL:  Soft, Nontender, Nondistended; Bowel sounds present  PERIPHERAL VASCULAR:  Extremities warm with mild b/l ankle edema. 2+ Peripheral Pulses, No cyanosis, No calf tenderness  MUSCULOSKELETAL: + lumbar back tenderness to palpation, Rt> LT; Motor Strength 5/5 B/L upper and lower extremities; moves all extremities equally against gravity; ROM intact; negative SLR  SKIN: + jaundice, + icterus; + tattoos left arm right abdomen c/d/i; Warm, dry, intact. No rashes, lesions, scars or wounds.     Risk factors associated with adverse outcomes related to opioid treatment  [ ]  Concurrent benzodiazepine use  [X] History/ Active substance use or alcohol use disorder  [ ] Psychiatric co-morbidity  [ ] Sleep apnea  [ ] COPD  [ ] BMI> 35  [X] Liver dysfunction  [ ] Renal dysfunction  [ ] CHF  [X] Smoker  [ ]  Age > 60 years    [X]  [place]  Reviewed and Copied to Chart. See below.    Plan of care and goal oriented pain management treatment options were discussed with patient and /or primary care giver; all questions and concerns were addressed and care was aligned with patient's wishes.    Educated patient on goal oriented pain management treatment options        Source of information: TOLU VARGAS, Chart review  Patient language: English  : n/a    HPI:  40 yo M Hx sickle cell anemia, anemia requiring blood transfusions (last in 2021) and Gout who was sent in by Dr. Jordan for low blood Hb level. Patient reports few days of fatigue and shortness of breath on exertion. He waited till his visit to Dr. Jordan to be evaluated for his symptoms. Reports that he usually gets monthly transfusions but has been transfusion - free over the summer since April. Takes Allopurinol, Colchicine, Folic Acid, Oxycodone, Omeprazole and is planned to start "Oxbrita" soon. Denies any infectious symptoms or blood in stool or urine. (07 Oct 2021 23:32)      Patient is a 41y old  Male with PMH sickle cell anemia, anemia requiring blood transfusions (last in 2021), PNA with intubation (), Gout, chronic back pain and marijuana use who was sent by Dr. Jordan for low Hb. On admission, H/H 4.8/13.1 s/p 2 units PRBCs. ProBNP 295. Awaiting cbc results. Imaging negative for b/l DVT. Pain consulted for chronic back pain, sickle cell pain. Pt seen and examined at bedside. Pt laying in bed, reports low back pain, greatest over right lower back pain score 4/10 and tolerable  SCALE USED: (1-10 VNRS). Pt describes pain as aching, throbbing radiating to right side body, alleviated by pain medication, exacerbated by movement. Pt also reports slight frontal headache, improving since blood transfusion. Reports taking oxycodone 30mg PO q3-4hr for pain at home. States he has been on oxycodone since he was a teenager. Pt tolerating PO diet. Denies lethargy, nausea, vomiting, constipation, itchiness. Reports lethargy has resolved. Reports last BM 10/6. Patient stated goal for pain control: to be able to take deep breaths, get out of bed to chair and ambulate with tolerable pain control. Pt is not vaccinated against COVID19 and is refusing at this time. Reports both his parents passed away from COVID in May 2021.     PAST MEDICAL & SURGICAL HISTORY:  Sickle cell anemia    Gout    Anemia requiring transfusions    PNA with intubation ()     Marijuana use    cholecystectomy over 20 years ago    FAMILY HISTORY: Both parents sickle cell disease carriers, . Passed away from [place] [address] in May 2021.       Social History:   [X] Denies ETOH use [X] + Marijuana use    Allergies    hydroxyurea (Other)  penicillin (Pruritus)    MEDICATIONS  (STANDING):  folic acid 2 milliGRAM(s) Oral daily    Vital Signs Last 24 Hrs  T(C): 36.4 (08 Oct 2021 09:10), Max: 36.8 (07 Oct 2021 20:17)  T(F): 97.5 (08 Oct 2021 09:10), Max: 98.2 (07 Oct 2021 20:17)  HR: 85 (08 Oct 2021 09:10) (81 - 98)  BP: 131/70 (08 Oct 2021 09:10) (117/68 - 139/80)  BP(mean): --  RR: 18 (08 Oct 2021 09:10) (17 - 18)  SpO2: 94% (08 Oct 2021 09:10) (88% - 96%)    LABS: Reviewed.                          4.8    7.63  )-----------( 201      ( 07 Oct 2021 18:02 )             13.1     10-07    140  |  109<H>  |  20<H>  ----------------------------<  92  4.3   |  25  |  1.08    Ca    8.7      07 Oct 2021 18:02    TPro  6.9  /  Alb  3.3<L>  /  TBili  7.7<H>  /  DBili  x   /  AST  124<H>  /  ALT  63<H>  /  AlkPhos  139<H>  10-07      LIVER FUNCTIONS - ( 07 Oct 2021 18:02 )  Alb: 3.3 g/dL / Pro: 6.9 g/dL / ALK PHOS: 139 U/L / ALT: 63 U/L DA / AST: 124 U/L / GGT: x             CAPILLARY BLOOD GLUCOSE        COVID-19 PCR: NotDetec (07 Oct 2021 18:02)      Radiology: Reviewed.   < from: US Duplex Venous Lower Ext Complete, Bilateral (10.07.21 @ 20:47) >    EXAM:  US DPLX LWR EXT VEINS COMPL BI                            PROCEDURE DATE:  10/07/2021          INTERPRETATION:  CLINICAL INFORMATION: Leg swelling    COMPARISON: None available.    TECHNIQUE: Duplex sonography of the BILATERAL LOWER extremity veins with color and spectral Doppler, with and without compression.    FINDINGS:    RIGHT:  Normal compressibility of the RIGHT common femoral, femoral and popliteal veins.  Doppler examination shows normal spontaneous and phasic flow.  No RIGHT calf vein thrombosis is detected.    LEFT:  Normal compressibility of the LEFT common femoral, femoral and popliteal veins.  Doppler examination shows normal spontaneous and phasic flow.  No LEFT calf vein thrombosis is detected.    IMPRESSION:  No evidence of deep venous thrombosis in either lower extremity.          --- End of Report ---            MEKHI ADHIKARI MD; Attending Radiologist  This document has been electronically signed. Oct  7 2021  8:48PM    < end of copied text >      ORT Score -   Family Hx of substance abuse	Female	      Male  Alcohol 	                                           1                     3  Illegal drugs	                                   2                     3  Rx drugs                                           4 	                  4  Personal Hx of substance abuse		  Alcohol 	                                          3	                  3  Illegal drugs                                     4	                  4  Rx drugs                                            5 	                  5  Age between 16- 45 years	           1                     1  hx preadolescent sexual abuse	   3 	                  0  Psychological disease		  ADD, OCD, bipolar, schizophrenia   2	          2  Depression                                           1 	          1  Total: 5  a score of 3 or lower indicates low risk for opioid abuse		  a score of 4-7 indicates moderate risk for opioid abuse		  a score of 8 or higher indicates high risk for opioid abuse  	  REVIEW OF SYSTEMS:  CONSTITUTIONAL: No fever + fatigue (resolved)   HEENT:  + mild frontal headache. No dizziness, difficulty hearing, no change in vision  NECK: No pain or stiffness  RESPIRATORY: No cough, wheezing, chills or hemoptysis; No shortness of breath (reports SOB resolved)  CARDIOVASCULAR: No chest pain, palpitations, dizziness, + b/l ankle swelling (improving)  GASTROINTESTINAL: No loss of appetite, decreased PO intake. No abdominal or epigastric pain. No nausea, vomiting; No diarrhea or constipation.   GENITOURINARY: No dysuria, frequency, hematuria, retention or incontinence  MUSCULOSKELETAL: + chronic back pain;  no upper or lower motor strength weakness, no saddle anesthesia, bowel/bladder incontinence, no falls   NEURO: No numbness/tingling b/l LE, No weakness  PSYCHIATRIC: No depression, anxiety or difficulty sleeping    PHYSICAL EXAM:  GENERAL:  Alert & Oriented X4, cooperative, NAD, Good concentration. Speech is clear.   RESPIRATORY: Respirations even and unlabored. Clear to auscultation bilaterally; No rales, rhonchi, wheezing, or rubs  CARDIOVASCULAR: Normal S1/S2, regular rate and rhythm; No murmurs, rubs, or gallops. No JVD.   GASTROINTESTINAL:  Soft, Nontender, Nondistended; Bowel sounds present  PERIPHERAL VASCULAR:  Extremities warm with mild b/l ankle edema. 2+ Peripheral Pulses, No cyanosis, No calf tenderness  MUSCULOSKELETAL: + lumbar back tenderness to palpation, Rt> LT; Motor Strength 5/5 B/L upper and lower extremities; moves all extremities equally against gravity; ROM intact; negative SLR  SKIN: + jaundice, + icterus; + tattoos left arm right abdomen c/d/i; Warm, dry, intact. No rashes, lesions, scars or wounds.     Risk factors associated with adverse outcomes related to opioid treatment  [ ]  Concurrent benzodiazepine use  [X] History/ Active substance use or alcohol use disorder  [ ] Psychiatric co-morbidity  [ ] Sleep apnea  [ ] COPD  [ ] BMI> 35  [X] Liver dysfunction  [ ] Renal dysfunction  [ ] CHF  [X] Smoker  [ ]  Age > 60 years    [X]  [place]  Reviewed and Copied to Chart. See below.    Plan of care and goal oriented pain management treatment options were discussed with patient and /or primary care giver; all questions and concerns were addressed and care was aligned with patient's wishes.    Educated patient on goal oriented pain management treatment options

## 2021-10-08 NOTE — PROGRESS NOTE ADULT - PROBLEM SELECTOR PLAN 1
-  sent in by Dr Jordan for low Hb  -  Hgb in ED 4.8 in ED   -  diffuse jaundice, icterus  -  bili 7.7  -  Trop negative x 1  -  transfuse total of 2 units   -  pre-medicate: IV tylenol, benadryl IVP 50 and decadron 8 IVP  -  post transfusion H/H   7/1 / 19.8

## 2021-10-08 NOTE — CONSULT NOTE ADULT - PROBLEM SELECTOR RECOMMENDATION 9
Pt with hx chronic back pain due to sickle cell anemia. H/H 4.8/13.1 s/p 2 units PRBCs. Awaiting cbc results.   Opioid pain recommendations   - Home dose oxycodone 30 mg PO q 4 hours PRN severe pain. Monitor for sedation/ respiratory depression.   Non-opioid pain recommendations   - Avoid Tylenol elevated LFTs, jaundice, icterus   - Lidoderm 5% patch daily.   Bowel Regimen  - Continue Miralax 17G PO daily  - Continue Senna 2 tablets at bedtime for constipation  Mild pain   - Non-pharmacological pain treatment recommendations  - Warm/ Cool packs PRN   - Repositioning, imagery, relaxation, distraction.  - Physical therapy OOB if no contraindications   Recommendations discussed with primary team and RN Pt with hx chronic back pain due to sickle cell anemia. H/H 4.8/13.1 s/p 2 units PRBCs. Awaiting cbc results. Pending reticulocyte count.  Opioid pain recommendations   - Home dose oxycodone 30 mg PO q 4 hours PRN severe pain. Monitor for sedation/ respiratory depression.   Non-opioid pain recommendations   - Avoid Tylenol elevated LFTs, jaundice, icterus   - Lidoderm 5% patch daily.   Bowel Regimen  - Continue Miralax 17G PO daily  - Continue Senna 2 tablets at bedtime for constipation  Mild pain   - Non-pharmacological pain treatment recommendations  - Warm/ Cool packs PRN   - Repositioning, imagery, relaxation, distraction.  - Physical therapy OOB if no contraindications   Recommendations discussed with primary team and RN

## 2021-10-08 NOTE — PROGRESS NOTE ADULT - PROBLEM SELECTOR PLAN 2
-  Hx SSA followed by Dr. Jordan  -  anemia plan as above  -  c/o  #6-7 / 10 lower back pain  -  Seen by pain management today  -  LDH, Haptoglobin pending today -  Hx SSA followed by Dr. Jordan  -  anemia plan as above  -  c/o  #6-7 / 10 lower back pain  -  Seen by pain management today  -  home dose of oxy ordered Q4 hours  -  LDH, Haptoglobin pending today

## 2021-10-08 NOTE — CONSULT NOTE ADULT - ASSESSMENT
Confidential Drug Utilization Report  Search Terms: Alex Downey, 1980Search Date: 10/08/2021 10:47:39 AM  The Drug Utilization Report below displays all of the controlled substance prescriptions, if any, that your patient has filled in the last twelve months. The information displayed on this report is compiled from pharmacy submissions to the Department, and accurately reflects the information as submitted by the pharmacies.    This report was requested by: Lina Cintron | Reference #: [id number]    You have not added a RAFAELA number. Keeping your RAFAELA number(s) up to date on the My RAFAELA # page will enable the separation of your prescriptions from others in the search results.    Others' Prescriptions  Patient Name: Alex DowneyBirth Date: 1980  Address: [address] [place] [id number]: Male  Rx Written	Rx Dispensed	Drug	Quantity	Days Supply	Prescriber Name	Prescriber Rafaela #	Payment Method	Dispenser  08/12/2021	08/12/2021	oxycodone hcl 30 mg tablet	180	30	Jordan, Shirley SOLITARIO	[id number]	Insurance	[facility]   07/15/2021	07/15/2021	oxycodone hcl 30 mg tablet	180	30	Jordan, Shirley SOLITARIO	[id number]	Insurance	[facility]   05/19/2021	05/19/2021	oxycodone hcl 30 mg tablet	180	30	Jordan, Shirley SOLITARIO	[id number]	Insurance	[facility]     Patient Name: Alex Dillon Date: 1980  Address: [address] [place] [id number]: Male  Rx Written	Rx Dispensed	Drug	Quantity	Days Supply	Prescriber Name	Prescriber Rafaela #	Payment Method	Dispenser  04/14/2021	04/14/2021	morphine sulfate ir 30 mg tab	24	7	Wilian Carty	[id number]	Insurance	[facility]  03/25/2021	03/25/2021	tramadol hcl 50 mg tablet	21	7	Avril Topete NP	[id number]	Insurance	[facility]  01/28/2021	01/28/2021	morphine sulfate ir 30 mg tab	42	7	Alexandra Fermin	[id number]	Insurance	[facility]  01/22/2021	01/22/2021	morphine sulfate ir 30 mg tab	42	7	Martha Stewart	[id number]	Insurance	[facility]  01/05/2021	01/07/2021	morphine sulfate ir 30 mg tab	42	7	Jeny Alexandra IRENE	[id number]	Insurance	[facility]  12/31/2020	12/31/2020	oxycodone hcl 30 mg tablet	210	30	Elinor Griggsshirley MESA	[id number]	Insurance	[facility]  12/03/2020	12/03/2020	oxycodone hcl 30 mg tablet	210	30	Ross Amezcua	[id number]	Insurance	[facility]  11/05/2020	11/05/2020	oxycodone hcl 30 mg tablet	210	30	Martha Stewart	[id number]	Insurance	[facility]  * - Drugs marked with an asterisk are compound drugs. If the compound drug is made up of more than one controlled substance, then each controlled substance will be a separate row in the table.

## 2021-10-08 NOTE — DISCHARGE NOTE NURSING/CASE MANAGEMENT/SOCIAL WORK - PATIENT PORTAL LINK FT
You can access the FollowMyHealth Patient Portal offered by [facility] by registering at the following website: [email or web address]. By joining [facility]’s FollowMyHealth portal, you will also be able to view your health information using other applications (apps) compatible with our system.

## 2021-10-08 NOTE — PATIENT PROFILE ADULT - INTERNATIONAL TRAVEL
Patient denies CP, SOB ,no more nausea, Edema improving    apixaban 5 milliGRAM(s) Oral every 12 hours  aspirin enteric coated 81 milliGRAM(s) Oral daily  calcitriol   Capsule 0.5 MICROGram(s) Oral daily  calcium acetate 667 milliGRAM(s) Oral four times a day with meals  calcium carbonate 1250 mG  + Vitamin D (OsCal 500 + D) 1 Tablet(s) Oral four times a day  chlorhexidine 4% Liquid 1 Application(s) Topical <User Schedule>  dextrose 40% Gel 15 Gram(s) Oral once PRN  dextrose 5%. 1000 milliLiter(s) IV Continuous <Continuous>  dextrose 50% Injectable 12.5 Gram(s) IV Push once  dextrose 50% Injectable 25 Gram(s) IV Push once  dextrose 50% Injectable 25 Gram(s) IV Push once  gabapentin 200 milliGRAM(s) Oral two times a day  glucagon  Injectable 1 milliGRAM(s) IntraMuscular once PRN  guaiFENesin   Syrup  (Sugar-Free) 200 milliGRAM(s) Oral every 6 hours PRN  hydrALAZINE 20 milliGRAM(s) Oral every 8 hours  insulin glargine Injectable (LANTUS) 12 Unit(s) SubCutaneous at bedtime  insulin lispro (HumaLOG) corrective regimen sliding scale   SubCutaneous three times a day before meals  insulin lispro (HumaLOG) corrective regimen sliding scale   SubCutaneous at bedtime  levothyroxine 175 MICROGram(s) Oral daily  magnesium oxide 400 milliGRAM(s) Oral three times a day with meals  metoprolol succinate ER 12.5 milliGRAM(s) Oral daily  milrinone Infusion 0.5 MICROgram(s)/kG/Min IV Continuous <Continuous>  rifaximin 550 milliGRAM(s) Oral two times a day  spironolactone 25 milliGRAM(s) Oral two times a day                            9.4    7.2   )-----------( 181      ( 03 Mar 2019 13:52 )             28.8       Hemoglobin: 9.4 g/dL (03-03 @ 13:52)  Hemoglobin: 10.7 g/dL (03-03 @ 12:07)  Hemoglobin: 10.6 g/dL (03-03 @ 06:31)  Hemoglobin: 9.8 g/dL (03-02 @ 01:54)  Hemoglobin: 10.1 g/dL (03-01 @ 06:25)      03-03    117<LL>  |  70<L>  |  22  ----------------------------<  438<H>  3.5   |  29  |  0.88    Ca    7.9<L>      03 Mar 2019 12:07  Phos  4.0     03-03  Mg     2.0     03-03    TPro  7.6  /  Alb  3.2<L>  /  TBili  4.3<H>  /  DBili  x   /  AST  72<H>  /  ALT  31  /  AlkPhos  397<H>  03-03    Creatinine Trend: 0.88<--, 1.05<--, 1.10<--, 1.15<--, 1.24<--, 1.20<--    COAGS: PT/INR - ( 03 Mar 2019 12:07 )   PT: 31.8 sec;   INR: 2.70 ratio         PTT - ( 03 Mar 2019 12:07 )  PTT:32.6 sec          T(C): 36.6 (03-03-19 @ 12:00), Max: 37.2 (03-02-19 @ 18:00)  HR: 92 (03-03-19 @ 13:00) (90 - 109)  BP: 108/69 (03-03-19 @ 13:00) (91/61 - 132/113)  RR: 14 (03-03-19 @ 13:00) (14 - 24)  SpO2: 96% (03-03-19 @ 13:00) (94% - 99%)  Wt(kg): --    I&O's Summary    02 Mar 2019 07:01  -  03 Mar 2019 07:00  --------------------------------------------------------  IN: 1542.1 mL / OUT: 2900 mL / NET: -1357.9 mL    03 Mar 2019 07:01  -  03 Mar 2019 14:12  --------------------------------------------------------  IN: 636.2 mL / OUT: 400 mL / NET: 236.2 mL        Gen: Appears well in NAD  HEENT:  (-)icterus (-)pallor  CV: N S1 S2 1/6 ROSALINDA (+)2 Pulses B/l  Resp:  Clear to ausculatation B/L, normal effort  GI: (+) BS Soft, NT, ND  Lymph:  (+)Edema, (-)obvious lymphadenopathy  Skin: Warm to touch, Normal turgor  Psych: Appropriate mood and affect        TELEMETRY: 	  V paced     ECG:  	Sinus Vpaced    RADIOLOGY:         CXR:  no infiltrate     ASSESSMENT/PLAN: 	61y Female  Severe  NICM MDT BIV ICD DM, Thyroid cancer CHF on cont. milrinone infusion via PICC, HTN, DM, who reports a few days of progressively worsening SOB at rest and w/ exertion acute on chronic decompensated systolic heart failure    - cont Eliquis for PAF  - Off Bumex gtt  - would start PO diuretics  - cont home milrinone gtt  - Advanced CHF therapy per CHF team  - appears euvolemic on exam    Augusto Grimes MD, [facility]  BEEPER [phone] No

## 2021-10-08 NOTE — PROGRESS NOTE ADULT - ASSESSMENT
42 yo M Hx sickle cell anemia, anemia requiring blood transfusions (last in April 2021) and Gout who was sent in by Dr. Jordan for severe symptomatic anemia requiring transfusion.   Hemoglobin on admission found to be 4.8    Transfused a total of 2 units PRBC    [facility]  HOSSEIN  Phone: [phone] 42 yo M Hx sickle cell anemia, anemia requiring blood transfusions (last in April 2021) and Gout who was sent in by Dr. Jordan for severe symptomatic anemia requiring transfusion.   Hemoglobin on admission found to be 4.8    Transfused a total of 2 units PRBC

## 2021-10-08 NOTE — PROGRESS NOTE ADULT - SUBJECTIVE AND OBJECTIVE BOX
41 year old male with a history of sickle cell anemia and gout admitted with low hgb of 4.8    Patient seen and examined  no overnight issues  c/o chronic lower back pain    MEDICATIONS  (STANDING):  folic acid 2 milliGRAM(s) Oral daily  lidocaine   4% Patch 1 Patch Transdermal daily  polyethylene glycol 3350 17 Gram(s) Oral daily  senna 2 Tablet(s) Oral at bedtime    MEDICATIONS  (PRN):  oxyCODONE    IR 30 milliGRAM(s) Oral every 4 hours PRN Severe Pain (7 - 10)      REVIEW OF SYSTEMS  General:  No acute distress	  Skin/Breast:  jaundiced  Ophthalmologic:  no visual changes.  icterus sclera  Respiratory and Thorax:  No SOB or cough  Cardiovascular:	no chest pain, palpitations  Gastrointestinal:	 no abdominal pain, nausea, vomiting, blood in stool  Genitourinary:  No dysuria, hematuria  Musculoskeletal:	  lower back pain #6 to 7  Neurological:   denies any dizziness, lightheadedness, headache  Hematology/Lymphatics:	  	  	  Vital Signs Last 24 Hrs  T(C): 36.4 (08 Oct 2021 09:10), Max: 36.8 (07 Oct 2021 20:17)  T(F): 97.5 (08 Oct 2021 09:10), Max: 98.2 (07 Oct 2021 20:17)  HR: 85 (08 Oct 2021 09:10) (81 - 98)  BP: 131/70 (08 Oct 2021 09:10) (117/68 - 139/80)  BP(mean): --  RR: 18 (08 Oct 2021 09:10) (17 - 18)  SpO2: 94% (08 Oct 2021 09:10) (88% - 96%)      PHYSICAL EXAM:  Constitutional:  No acute distress  Eyes:  icterus sclera  ENMT:  moist mucous membranes  Neck:  supple,  no JVD noted  Respiratory:  clear bilaterally.  no rales, wheezes or rhonchi  Cardiovascular:  regular rate and rhythm  Gastrointestinal:  soft and non tender  Neurological:  alert and oriented.  no neurological deficits  Skin:   jaundiced  Musculoskeletal:  moves all extremities without limitation  Psychiatric:   behavioral appropriate to situation                            7.1    3.65  )-----------( 226      ( 08 Oct 2021 10:36 )             19.8       10-08    140  |  110<H>  |  23<H>  ----------------------------<  156<H>  4.6   |  24  |  0.87    Ca    9.4      08 Oct 2021 10:36  Phos  2.2     10-08  Mg     1.9     10-08    TPro  7.3  /  Alb  3.4<L>  /  TBili  7.9<H>  /  DBili  4.1<H>  /  AST  125<H>  /  ALT  62<H>  /  AlkPhos  135<H>  10-08      < from: US Duplex Venous Lower Ext Complete, Bilateral (10.07.21 @ 20:47) >  RIGHT:  Normal compressibility of the RIGHT common femoral, femoral and popliteal veins.  Doppler examination shows normal spontaneous and phasic flow.  No RIGHT calf vein thrombosis is detected.    LEFT:  Normal compressibility of the LEFT common femoral, femoral and popliteal veins.  Doppler examination shows normal spontaneous and phasic flow.  No LEFT calf vein thrombosis is detected.    IMPRESSION:  No evidence of deep venous thrombosis in either lower extremity.

## 2021-10-08 NOTE — DISCHARGE NOTE PROVIDER - HOSPITAL COURSE
42 yo M Hx sickle cell anemia, anemia requiring blood transfusions (last in April 2021) and Gout who was sent in by Dr. Jordan for severe symptomatic anemia requiring transfusion.   Hemoglobin on admission found to be 4.8    Patient was transfused a total of two units of PRBC,  repeat Hgb 7.1.  As per Hematologist who knows patient his baseline hemoglobin is 6 40 yo M Hx sickle cell anemia, anemia requiring blood transfusions (last in April 2021) and Gout who was sent in by Dr. Jordan for severe symptomatic anemia requiring transfusion.   Hemoglobin on admission found to be 4.8  Patient was transfused a total of two units of PRBC,  repeat Hgb 7.1.  As per Hematologist who knows patient his baseline hemoglobin is 6

## 2021-10-08 NOTE — PROGRESS NOTE ADULT - PROBLEM SELECTOR PLAN 4
-  No pharmacological DVT prophylaxis given low Hgb levels  -  ICD while in bed  -  GI prophylaxis with Protonix

## 2021-10-28 ENCOUNTER — INPATIENT (INPATIENT)
Facility: HOSPITAL | Age: 41
LOS: 0 days | Discharge: HOME | DRG: 812 | End: 2021-10-29
Attending: STUDENT IN AN ORGANIZED HEALTH CARE EDUCATION/TRAINING PROGRAM | Admitting: STUDENT IN AN ORGANIZED HEALTH CARE EDUCATION/TRAINING PROGRAM
Payer: MEDICAID

## 2021-10-28 VITALS
HEIGHT: 69 IN | WEIGHT: 195.11 LBS | SYSTOLIC BLOOD PRESSURE: 110 MMHG | HEART RATE: 86 BPM | RESPIRATION RATE: 18 BRPM | OXYGEN SATURATION: 95 % | TEMPERATURE: 98 F | DIASTOLIC BLOOD PRESSURE: 71 MMHG

## 2021-10-28 DIAGNOSIS — E80.6 OTHER DISORDERS OF BILIRUBIN METABOLISM: ICD-10-CM

## 2021-10-28 DIAGNOSIS — D64.9 ANEMIA, UNSPECIFIED: ICD-10-CM

## 2021-10-28 DIAGNOSIS — D57.00 HB-SS DISEASE WITH CRISIS, UNSPECIFIED: ICD-10-CM

## 2021-10-28 DIAGNOSIS — D57.1 SICKLE-CELL DISEASE WITHOUT CRISIS: ICD-10-CM

## 2021-10-28 DIAGNOSIS — Z90.49 ACQUIRED ABSENCE OF OTHER SPECIFIED PARTS OF DIGESTIVE TRACT: Chronic | ICD-10-CM

## 2021-10-28 DIAGNOSIS — M10.9 GOUT, UNSPECIFIED: ICD-10-CM

## 2021-10-28 DIAGNOSIS — D72.829 ELEVATED WHITE BLOOD CELL COUNT, UNSPECIFIED: ICD-10-CM

## 2021-10-28 DIAGNOSIS — Z29.9 ENCOUNTER FOR PROPHYLACTIC MEASURES, UNSPECIFIED: ICD-10-CM

## 2021-10-28 LAB
ALBUMIN SERPL ELPH-MCNC: 3.1 G/DL — LOW (ref 3.5–5)
ALBUMIN SERPL ELPH-MCNC: 3.4 G/DL — LOW (ref 3.5–5)
ALP SERPL-CCNC: 133 U/L — HIGH (ref 40–120)
ALP SERPL-CCNC: 144 U/L — HIGH (ref 40–120)
ALT FLD-CCNC: 56 U/L DA — SIGNIFICANT CHANGE UP (ref 10–60)
ALT FLD-CCNC: 58 U/L DA — SIGNIFICANT CHANGE UP (ref 10–60)
ANION GAP SERPL CALC-SCNC: 8 MMOL/L — SIGNIFICANT CHANGE UP (ref 5–17)
ANION GAP SERPL CALC-SCNC: 9 MMOL/L — SIGNIFICANT CHANGE UP (ref 5–17)
ANISOCYTOSIS BLD QL: SIGNIFICANT CHANGE UP
APTT BLD: 27.6 SEC — SIGNIFICANT CHANGE UP (ref 27.5–35.5)
AST SERPL-CCNC: 62 U/L — HIGH (ref 10–40)
AST SERPL-CCNC: 67 U/L — HIGH (ref 10–40)
BASOPHILS # BLD AUTO: 0.14 K/UL — SIGNIFICANT CHANGE UP (ref 0–0.2)
BASOPHILS # BLD AUTO: 0.27 K/UL — HIGH (ref 0–0.2)
BASOPHILS NFR BLD AUTO: 1 % — SIGNIFICANT CHANGE UP (ref 0–2)
BASOPHILS NFR BLD AUTO: 2 % — SIGNIFICANT CHANGE UP (ref 0–2)
BILIRUB SERPL-MCNC: 4.8 MG/DL — HIGH (ref 0.2–1.2)
BILIRUB SERPL-MCNC: 5.2 MG/DL — HIGH (ref 0.2–1.2)
BUN SERPL-MCNC: 22 MG/DL — HIGH (ref 7–18)
BUN SERPL-MCNC: 22 MG/DL — HIGH (ref 7–18)
CALCIUM SERPL-MCNC: 8.1 MG/DL — LOW (ref 8.4–10.5)
CALCIUM SERPL-MCNC: 8.2 MG/DL — LOW (ref 8.4–10.5)
CHLORIDE SERPL-SCNC: 111 MMOL/L — HIGH (ref 96–108)
CHLORIDE SERPL-SCNC: 112 MMOL/L — HIGH (ref 96–108)
CO2 SERPL-SCNC: 21 MMOL/L — LOW (ref 22–31)
CO2 SERPL-SCNC: 22 MMOL/L — SIGNIFICANT CHANGE UP (ref 22–31)
CREAT SERPL-MCNC: 0.95 MG/DL — SIGNIFICANT CHANGE UP (ref 0.5–1.3)
CREAT SERPL-MCNC: 1.02 MG/DL — SIGNIFICANT CHANGE UP (ref 0.5–1.3)
ELLIPTOCYTES BLD QL SMEAR: SIGNIFICANT CHANGE UP
EOSINOPHIL # BLD AUTO: 0.09 K/UL — SIGNIFICANT CHANGE UP (ref 0–0.5)
EOSINOPHIL # BLD AUTO: 0.13 K/UL — SIGNIFICANT CHANGE UP (ref 0–0.5)
EOSINOPHIL NFR BLD AUTO: 0.6 % — SIGNIFICANT CHANGE UP (ref 0–6)
EOSINOPHIL NFR BLD AUTO: 1 % — SIGNIFICANT CHANGE UP (ref 0–6)
GLUCOSE SERPL-MCNC: 100 MG/DL — HIGH (ref 70–99)
GLUCOSE SERPL-MCNC: 108 MG/DL — HIGH (ref 70–99)
HAPTOGLOB SERPL-MCNC: <20 MG/DL — LOW (ref 34–200)
HCT VFR BLD CALC: 15.1 % — CRITICAL LOW (ref 39–50)
HCT VFR BLD CALC: 15.6 % — CRITICAL LOW (ref 39–50)
HCT VFR BLD CALC: 22.6 % — LOW (ref 39–50)
HGB BLD-MCNC: 5.2 G/DL — CRITICAL LOW (ref 13–17)
HGB BLD-MCNC: 5.4 G/DL — CRITICAL LOW (ref 13–17)
HGB BLD-MCNC: 7.6 G/DL — LOW (ref 13–17)
HPIV4 RNA SPEC QL NAA+PROBE: DETECTED
HYPOCHROMIA BLD QL: SLIGHT — SIGNIFICANT CHANGE UP
IMM GRANULOCYTES NFR BLD AUTO: 1.2 % — SIGNIFICANT CHANGE UP (ref 0–1.5)
INR BLD: 1.12 RATIO — SIGNIFICANT CHANGE UP (ref 0.88–1.16)
LDH SERPL L TO P-CCNC: 570 U/L — HIGH (ref 120–225)
LG PLATELETS BLD QL AUTO: SLIGHT — SIGNIFICANT CHANGE UP
LYMPHOCYTES # BLD AUTO: 45.4 % — HIGH (ref 13–44)
LYMPHOCYTES # BLD AUTO: 57 % — HIGH (ref 13–44)
LYMPHOCYTES # BLD AUTO: 6.56 K/UL — HIGH (ref 1–3.3)
LYMPHOCYTES # BLD AUTO: 7.62 K/UL — HIGH (ref 1–3.3)
MACROCYTES BLD QL: SLIGHT — SIGNIFICANT CHANGE UP
MAGNESIUM SERPL-MCNC: 1.8 MG/DL — SIGNIFICANT CHANGE UP (ref 1.6–2.6)
MAGNESIUM SERPL-MCNC: 1.9 MG/DL — SIGNIFICANT CHANGE UP (ref 1.6–2.6)
MANUAL SMEAR VERIFICATION: SIGNIFICANT CHANGE UP
MCHC RBC-ENTMCNC: 31.3 PG — SIGNIFICANT CHANGE UP (ref 27–34)
MCHC RBC-ENTMCNC: 32.5 PG — SIGNIFICANT CHANGE UP (ref 27–34)
MCHC RBC-ENTMCNC: 32.5 PG — SIGNIFICANT CHANGE UP (ref 27–34)
MCHC RBC-ENTMCNC: 33.6 GM/DL — SIGNIFICANT CHANGE UP (ref 32–36)
MCHC RBC-ENTMCNC: 34.4 GM/DL — SIGNIFICANT CHANGE UP (ref 32–36)
MCHC RBC-ENTMCNC: 35.1 GM/DL — SIGNIFICANT CHANGE UP (ref 32–36)
MCV RBC AUTO: 92.8 FL — SIGNIFICANT CHANGE UP (ref 80–100)
MCV RBC AUTO: 93 FL — SIGNIFICANT CHANGE UP (ref 80–100)
MCV RBC AUTO: 94.4 FL — SIGNIFICANT CHANGE UP (ref 80–100)
MICROCYTES BLD QL: SLIGHT — SIGNIFICANT CHANGE UP
MONOCYTES # BLD AUTO: 0.94 K/UL — HIGH (ref 0–0.9)
MONOCYTES # BLD AUTO: 1.7 K/UL — HIGH (ref 0–0.9)
MONOCYTES NFR BLD AUTO: 11.8 % — SIGNIFICANT CHANGE UP (ref 2–14)
MONOCYTES NFR BLD AUTO: 7 % — SIGNIFICANT CHANGE UP (ref 2–14)
NEUTROPHILS # BLD AUTO: 4.41 K/UL — SIGNIFICANT CHANGE UP (ref 1.8–7.4)
NEUTROPHILS # BLD AUTO: 5.77 K/UL — SIGNIFICANT CHANGE UP (ref 1.8–7.4)
NEUTROPHILS NFR BLD AUTO: 33 % — LOW (ref 43–77)
NEUTROPHILS NFR BLD AUTO: 40 % — LOW (ref 43–77)
NRBC # BLD: 0 /100 WBCS — SIGNIFICANT CHANGE UP (ref 0–0)
NRBC # BLD: 0 /100 — SIGNIFICANT CHANGE UP (ref 0–0)
NRBC # BLD: 1 /100 WBCS — HIGH (ref 0–0)
OVALOCYTES BLD QL SMEAR: SLIGHT — SIGNIFICANT CHANGE UP
PAPPENHEIMER BOD BLD QL SMEAR: PRESENT — SIGNIFICANT CHANGE UP
PHOSPHATE SERPL-MCNC: 4.2 MG/DL — SIGNIFICANT CHANGE UP (ref 2.5–4.5)
PLAT MORPH BLD: NORMAL — SIGNIFICANT CHANGE UP
PLATELET # BLD AUTO: 297 K/UL — SIGNIFICANT CHANGE UP (ref 150–400)
PLATELET # BLD AUTO: 297 K/UL — SIGNIFICANT CHANGE UP (ref 150–400)
PLATELET # BLD AUTO: 308 K/UL — SIGNIFICANT CHANGE UP (ref 150–400)
PLATELET COUNT - ESTIMATE: NORMAL — SIGNIFICANT CHANGE UP
POIKILOCYTOSIS BLD QL AUTO: SIGNIFICANT CHANGE UP
POLYCHROMASIA BLD QL SMEAR: SLIGHT — SIGNIFICANT CHANGE UP
POTASSIUM SERPL-MCNC: 3.8 MMOL/L — SIGNIFICANT CHANGE UP (ref 3.5–5.3)
POTASSIUM SERPL-MCNC: 3.9 MMOL/L — SIGNIFICANT CHANGE UP (ref 3.5–5.3)
POTASSIUM SERPL-SCNC: 3.8 MMOL/L — SIGNIFICANT CHANGE UP (ref 3.5–5.3)
POTASSIUM SERPL-SCNC: 3.9 MMOL/L — SIGNIFICANT CHANGE UP (ref 3.5–5.3)
PROCALCITONIN SERPL-MCNC: 0.05 NG/ML — SIGNIFICANT CHANGE UP (ref 0.02–0.1)
PROT SERPL-MCNC: 6.6 G/DL — SIGNIFICANT CHANGE UP (ref 6–8.3)
PROT SERPL-MCNC: 6.9 G/DL — SIGNIFICANT CHANGE UP (ref 6–8.3)
PROTHROM AB SERPL-ACNC: 13.2 SEC — SIGNIFICANT CHANGE UP (ref 10.6–13.6)
RAPID RVP RESULT: DETECTED
RBC # BLD: 1.6 M/UL — LOW (ref 4.2–5.8)
RBC # BLD: 1.6 M/UL — LOW (ref 4.2–5.8)
RBC # BLD: 1.66 M/UL — LOW (ref 4.2–5.8)
RBC # BLD: 2.43 M/UL — LOW (ref 4.2–5.8)
RBC # FLD: 17.5 % — HIGH (ref 10.3–14.5)
RBC # FLD: 19 % — HIGH (ref 10.3–14.5)
RBC # FLD: 19.6 % — HIGH (ref 10.3–14.5)
RBC BLD AUTO: ABNORMAL
RETICS #: 223.5 K/UL — HIGH (ref 25–125)
RETICS/RBC NFR: 14 % — HIGH (ref 0.5–2.5)
SARS-COV-2 RNA SPEC QL NAA+PROBE: SIGNIFICANT CHANGE UP
SARS-COV-2 RNA SPEC QL NAA+PROBE: SIGNIFICANT CHANGE UP
SCHISTOCYTES BLD QL AUTO: SLIGHT — SIGNIFICANT CHANGE UP
SICKLE CELLS BLD QL SMEAR: SIGNIFICANT CHANGE UP
SMUDGE CELLS # BLD: PRESENT — SIGNIFICANT CHANGE UP
SODIUM SERPL-SCNC: 141 MMOL/L — SIGNIFICANT CHANGE UP (ref 135–145)
SODIUM SERPL-SCNC: 142 MMOL/L — SIGNIFICANT CHANGE UP (ref 135–145)
TARGETS BLD QL SMEAR: SLIGHT — SIGNIFICANT CHANGE UP
WBC # BLD: 12.03 K/UL — HIGH (ref 3.8–10.5)
WBC # BLD: 13.37 K/UL — HIGH (ref 3.8–10.5)
WBC # BLD: 14.44 K/UL — HIGH (ref 3.8–10.5)
WBC # FLD AUTO: 12.03 K/UL — HIGH (ref 3.8–10.5)
WBC # FLD AUTO: 13.37 K/UL — HIGH (ref 3.8–10.5)
WBC # FLD AUTO: 14.44 K/UL — HIGH (ref 3.8–10.5)

## 2021-10-28 PROCEDURE — 99223 1ST HOSP IP/OBS HIGH 75: CPT

## 2021-10-28 PROCEDURE — 99285 EMERGENCY DEPT VISIT HI MDM: CPT

## 2021-10-28 PROCEDURE — 71045 X-RAY EXAM CHEST 1 VIEW: CPT | Mod: 26

## 2021-10-28 RX ORDER — DIPHENHYDRAMINE HCL 50 MG
25 CAPSULE ORAL ONCE
Refills: 0 | Status: COMPLETED | OUTPATIENT
Start: 2021-10-28 | End: 2021-10-28

## 2021-10-28 RX ORDER — COLCHICINE 0.6 MG
0.6 TABLET ORAL DAILY
Refills: 0 | Status: DISCONTINUED | OUTPATIENT
Start: 2021-10-28 | End: 2021-10-29

## 2021-10-28 RX ORDER — POLYETHYLENE GLYCOL 3350 17 G/17G
17 POWDER, FOR SOLUTION ORAL DAILY
Refills: 0 | Status: DISCONTINUED | OUTPATIENT
Start: 2021-10-28 | End: 2021-10-29

## 2021-10-28 RX ORDER — HYDROMORPHONE HYDROCHLORIDE 2 MG/ML
2 INJECTION INTRAMUSCULAR; INTRAVENOUS; SUBCUTANEOUS ONCE
Refills: 0 | Status: DISCONTINUED | OUTPATIENT
Start: 2021-10-28 | End: 2021-10-28

## 2021-10-28 RX ORDER — SENNA PLUS 8.6 MG/1
2 TABLET ORAL AT BEDTIME
Refills: 0 | Status: DISCONTINUED | OUTPATIENT
Start: 2021-10-28 | End: 2021-10-29

## 2021-10-28 RX ORDER — ALLOPURINOL 300 MG
100 TABLET ORAL DAILY
Refills: 0 | Status: DISCONTINUED | OUTPATIENT
Start: 2021-10-28 | End: 2021-10-29

## 2021-10-28 RX ORDER — ACETAMINOPHEN 500 MG
650 TABLET ORAL ONCE
Refills: 0 | Status: COMPLETED | OUTPATIENT
Start: 2021-10-28 | End: 2021-10-28

## 2021-10-28 RX ORDER — SODIUM CHLORIDE 9 MG/ML
1000 INJECTION INTRAMUSCULAR; INTRAVENOUS; SUBCUTANEOUS ONCE
Refills: 0 | Status: COMPLETED | OUTPATIENT
Start: 2021-10-28 | End: 2021-10-28

## 2021-10-28 RX ORDER — PANTOPRAZOLE SODIUM 20 MG/1
40 TABLET, DELAYED RELEASE ORAL
Refills: 0 | Status: DISCONTINUED | OUTPATIENT
Start: 2021-10-28 | End: 2021-10-29

## 2021-10-28 RX ORDER — KETOROLAC TROMETHAMINE 30 MG/ML
30 SYRINGE (ML) INJECTION ONCE
Refills: 0 | Status: DISCONTINUED | OUTPATIENT
Start: 2021-10-28 | End: 2021-10-28

## 2021-10-28 RX ORDER — FOLIC ACID 0.8 MG
1 TABLET ORAL DAILY
Refills: 0 | Status: DISCONTINUED | OUTPATIENT
Start: 2021-10-28 | End: 2021-10-29

## 2021-10-28 RX ORDER — HYDROMORPHONE HYDROCHLORIDE 2 MG/ML
2 INJECTION INTRAMUSCULAR; INTRAVENOUS; SUBCUTANEOUS EVERY 6 HOURS
Refills: 0 | Status: DISCONTINUED | OUTPATIENT
Start: 2021-10-28 | End: 2021-10-29

## 2021-10-28 RX ORDER — OXYCODONE HYDROCHLORIDE 5 MG/1
30 TABLET ORAL EVERY 4 HOURS
Refills: 0 | Status: DISCONTINUED | OUTPATIENT
Start: 2021-10-28 | End: 2021-10-29

## 2021-10-28 RX ADMIN — HYDROMORPHONE HYDROCHLORIDE 2 MILLIGRAM(S): 2 INJECTION INTRAMUSCULAR; INTRAVENOUS; SUBCUTANEOUS at 02:57

## 2021-10-28 RX ADMIN — PANTOPRAZOLE SODIUM 40 MILLIGRAM(S): 20 TABLET, DELAYED RELEASE ORAL at 07:49

## 2021-10-28 RX ADMIN — OXYCODONE HYDROCHLORIDE 30 MILLIGRAM(S): 5 TABLET ORAL at 18:18

## 2021-10-28 RX ADMIN — HYDROMORPHONE HYDROCHLORIDE 2 MILLIGRAM(S): 2 INJECTION INTRAMUSCULAR; INTRAVENOUS; SUBCUTANEOUS at 12:58

## 2021-10-28 RX ADMIN — HYDROMORPHONE HYDROCHLORIDE 2 MILLIGRAM(S): 2 INJECTION INTRAMUSCULAR; INTRAVENOUS; SUBCUTANEOUS at 12:28

## 2021-10-28 RX ADMIN — Medication 1 MILLIGRAM(S): at 12:12

## 2021-10-28 RX ADMIN — SENNA PLUS 2 TABLET(S): 8.6 TABLET ORAL at 21:39

## 2021-10-28 RX ADMIN — Medication 650 MILLIGRAM(S): at 08:46

## 2021-10-28 RX ADMIN — Medication 0.6 MILLIGRAM(S): at 12:13

## 2021-10-28 RX ADMIN — HYDROMORPHONE HYDROCHLORIDE 2 MILLIGRAM(S): 2 INJECTION INTRAMUSCULAR; INTRAVENOUS; SUBCUTANEOUS at 19:09

## 2021-10-28 RX ADMIN — Medication 100 MILLIGRAM(S): at 12:12

## 2021-10-28 RX ADMIN — Medication 30 MILLIGRAM(S): at 02:57

## 2021-10-28 RX ADMIN — Medication 650 MILLIGRAM(S): at 09:16

## 2021-10-28 RX ADMIN — Medication 25 MILLIGRAM(S): at 08:46

## 2021-10-28 RX ADMIN — OXYCODONE HYDROCHLORIDE 30 MILLIGRAM(S): 5 TABLET ORAL at 21:42

## 2021-10-28 RX ADMIN — OXYCODONE HYDROCHLORIDE 30 MILLIGRAM(S): 5 TABLET ORAL at 10:26

## 2021-10-28 RX ADMIN — OXYCODONE HYDROCHLORIDE 30 MILLIGRAM(S): 5 TABLET ORAL at 09:56

## 2021-10-28 RX ADMIN — HYDROMORPHONE HYDROCHLORIDE 2 MILLIGRAM(S): 2 INJECTION INTRAMUSCULAR; INTRAVENOUS; SUBCUTANEOUS at 18:51

## 2021-10-28 RX ADMIN — HYDROMORPHONE HYDROCHLORIDE 2 MILLIGRAM(S): 2 INJECTION INTRAMUSCULAR; INTRAVENOUS; SUBCUTANEOUS at 01:49

## 2021-10-28 RX ADMIN — HYDROMORPHONE HYDROCHLORIDE 2 MILLIGRAM(S): 2 INJECTION INTRAMUSCULAR; INTRAVENOUS; SUBCUTANEOUS at 05:43

## 2021-10-28 RX ADMIN — OXYCODONE HYDROCHLORIDE 30 MILLIGRAM(S): 5 TABLET ORAL at 17:48

## 2021-10-28 RX ADMIN — OXYCODONE HYDROCHLORIDE 30 MILLIGRAM(S): 5 TABLET ORAL at 22:21

## 2021-10-28 RX ADMIN — Medication 25 MILLIGRAM(S): at 12:13

## 2021-10-28 RX ADMIN — Medication 30 MILLIGRAM(S): at 01:50

## 2021-10-28 RX ADMIN — SODIUM CHLORIDE 1000 MILLILITER(S): 9 INJECTION INTRAMUSCULAR; INTRAVENOUS; SUBCUTANEOUS at 01:50

## 2021-10-28 RX ADMIN — HYDROMORPHONE HYDROCHLORIDE 2 MILLIGRAM(S): 2 INJECTION INTRAMUSCULAR; INTRAVENOUS; SUBCUTANEOUS at 05:09

## 2021-10-28 NOTE — ED PROVIDER NOTE - NSICDXPASTMEDICALHX_GEN_ALL_CORE_FT
PAST MEDICAL HISTORY:  Anemia requiring transfusions     Gout     Marijuana abuse     Pneumonia     Sickle cell anemia

## 2021-10-28 NOTE — ED ADULT NURSE NOTE - ED STAT RN HANDOFF DETAILS
pt.remained  stable. denies pain. admitted to medicine for siclle cell pain crisis. transfer to holding area. report given tp radha lester/.hgb 5.4 to start 1st unit of PRBC. awaiting blood

## 2021-10-28 NOTE — H&P ADULT - PROBLEM SELECTOR PLAN 2
WBC 14 with lymphocytosis and cough - likelier to be viral than bacterial  obtain blood culture  will not start empric abx for now  obtain procal  f/u rvp + covid swab  f/u cxr  no urinary symptoms

## 2021-10-28 NOTE — H&P ADULT - PROBLEM SELECTOR PLAN 1
p/w generalized pain and anemia  Hb 5.4 given 2u pRBC in ED  obtain reticulocyte count,   Heme Onc Dr. Jordan Consulted

## 2021-10-28 NOTE — CHART NOTE - NSCHARTNOTEFT_GEN_A_CORE
41 year old male with a PMH of Sickle Cell Anemia, Gout, h/o COVID Infection 3/2020 presented with a  complaint of generalized body pain, cough, chest pain, not relieved with pain meds at home   In ED: Hg 5.4, WBC 14, Absolute retic 223,   Admitted for sickle cell anemia   started on blood transfusion     Pt seen at bedside, co generalized body pain, bleed transfusion in progress. Found to be positive for Parainfluenza, placed on droplet/contact isolation   No chest pain or fever at present           OBJECTIVE:  Vital Signs Last 24 Hrs  T(C): 36.4 (28 Oct 2021 07:15), Max: 36.9 (28 Oct 2021 01:01)  T(F): 97.6 (28 Oct 2021 07:15), Max: 98.4 (28 Oct 2021 01:01)  HR: 74 (28 Oct 2021 07:15) (74 - 86)  BP: 115/78 (28 Oct 2021 07:15) (110/71 - 128/80)  BP(mean): --  RR: 18 (28 Oct 2021 07:15) (18 - 18)  SpO2: 96% (28 Oct 2021 07:15) (95% - 96%)    FOCUSED PHYSICAL EXAM:  Neuro: awake, alert, oriented x 3. No neuro deficit  Cardiovascular: Pulses +2 B/L in lower and upper extremities, HR regular, BP stable, No edema.  Respiratory: Respirations regular, unlabored, breath sounds clear B/L.   GI: Abdomen soft, non-tender, positive bowel sounds.  : no bladder distention noted. No complaints at this time.  Skin: Dry, intact, no bruising, no diaphoresis.        PLAN:       - transfuse 2 units PRBC  - fu post transfusion CBC   - monitor H/H   - pain management: Hydromorphone 2 mg IV q 6 hrs, oxycodone 30 mg q 4 hrs   - Hematology on board Dr Jordan   - continue home medications  - contact/dropplet iso for parainfluenza

## 2021-10-28 NOTE — H&P ADULT - NSHPPHYSICALEXAM_GEN_ALL_CORE
General - NAD, sitting up in bed, well groomed  Eyes - PERRLA, EOM intact  ENT - Nonicteric sclerae, PERRLA, EOMI. Oropharynx clear. Moist mucous membranes. Conjunctivae appear well perfused.   Neck - No noticeable or palpable swelling, redness or rash around throat or on face  Lymph Nodes - No lymphadenopathy  Cardiovascular - RRR no m/r/g, no JVD, no carotid bruits (+) R Chemoport  Lungs - Clear to auscultation, no use of accessory muscles, no crackles or wheezes.  Skin - No rashes, skin warm and dry, no erythematous areas  Abdomen - Normal bowel sounds, abdomen soft and nontender, distended abdomen  Extremities - No edema, cyanosis or clubbing  Musculoskeletal - 5/5 strength, normal range of motion, no swollen or erythematous joints.  Neuro– Alert and oriented x 3, CN 2-12 grossly intact.

## 2021-10-28 NOTE — ED PROVIDER NOTE - OBJECTIVE STATEMENT
41 year old male PMH sickle cell coming in with generalized joint pains. pt states a couple of days ago he felt like his gout was acting up but today pain significantly worsened. took oxycodone 60mg without any improvement of pain. called his hematologist dr shafer who advised pt to come to the ed for eval. pt states was here earlier this month for blood transfusions.

## 2021-10-28 NOTE — ED ADULT NURSE NOTE - OBJECTIVE STATEMENT
pain all over his body '' i'm having sickle cell crisis ''bld.drawn and sent to lab., pain scale 7/10

## 2021-10-28 NOTE — H&P ADULT - PROBLEM SELECTOR PLAN 3
Bilirubin 5.2  Pt with sickle cell likely from increased turnover  monitor bilirubin  Transaminitis improved compared to prior admission

## 2021-10-28 NOTE — ED PROVIDER NOTE - NSICDXFAMILYHX_GEN_ALL_CORE_FT
FAMILY HISTORY:  Father  Still living? Unknown  Family history of sickle cell trait, Age at diagnosis: Age Unknown    Mother  Still living? Unknown  Family history of sickle cell trait, Age at diagnosis: Age Unknown

## 2021-10-28 NOTE — H&P ADULT - ASSESSMENT
41M w/ PMH Sickle Cell disease, gout, p/w generalized body pain x1 day admitted for sickle cell anemia and pain control.

## 2021-10-28 NOTE — H&P ADULT - ATTENDING COMMENTS
Patient is a 41 year old male with a PMH of Sickle Cell Anemia, Gout, h/o COVID Infection 3/2020 who presented with a chief complaint of low hemoglobin.  Patient states that beginning approximately 2 days prior to current presentation he began to experience severe chest pain for which he took his prescribed pain medications though without adequate relief.    At time of examination in the ED, patient endorses severe generalized pain and was speaking in short sentences.    T(C): 36.5 (10-28-21 @ 05:12), Max: 36.9 (10-28-21 @ 01:01)  T(F): 97.7 (10-28-21 @ 05:12), Max: 98.4 (10-28-21 @ 01:01)  HR: 76 (10-28-21 @ 05:12) (76 - 86)  BP: 128/80 (10-28-21 @ 05:12) (110/71 - 128/80)  RR: 18 (10-28-21 @ 05:12) (18 - 18)  SpO2: 95% (10-28-21 @ 05:12) (95% - 95%)  Wt(kg): --    P/E: As above MAR    A/P:    Severe Anemia:  -2units PRBC pending in ED  -Will continue to closely monitor CBC  -Will send Retic Count  -Should ask Hematology to see patient while in-house    Sickle Cell Anemia:  -Pain control as necessary without sedating  -Will resume patient's Folic Acid    Leukocytosis:  -Will send Procalcitonin  -Will hold antimicrobials, for now    Hyperbilirubinemia:  -Can consider sending Bilirubin, Total, Direct and Indirect    Gout:  -Will resume patient's home medications    GI/DVT PPx:  -Intermittent Compression Stockings  -PPI

## 2021-10-28 NOTE — ED PROVIDER NOTE - COVID-19  TEST TYPE
Chief Complaint   Patient presents with     Annual Eye Exam      Accompanied by father  Last Eye Exam: 2017  Dilated Previously: Yes    Pt does use contacts 2-3 times a week. Pt is in a daily lens - she is not doing a fit today    What are you currently using to see?  glasses and contacts       Distance Vision Acuity: Satisfied with vision    Near Vision Acuity: Satisfied with vision while reading  with glasses    Eye Comfort: good  Do you use eye drops? : No  Occupation or Hobbies: 7th grade    Carlota Yadira  [employer]            Medical, surgical and family histories reviewed and updated 5/10/2019.       OBJECTIVE: See Ophthalmology exam    ASSESSMENT:    ICD-10-CM    1. Examination of eyes and vision Z01.00 EYE EXAM (SIMPLE-NONBILLABLE)   2. Myopia of both eyes H52.13 REFRACTION   3. Regular astigmatism of right eye H52.221 REFRACTION   4. Allergic conjunctivitis, bilateral H10.13 EYE EXAM (SIMPLE-NONBILLABLE)     olopatadine (PATANOL) 0.1 % ophthalmic solution      PLAN:     Patient Instructions   Recommend new glasses.    Patanol- 1 drop both eyes 2 x day as needed for itchy eyes.    Return in 1 year for a complete eye exam or sooner if needed.    Scott Salgado, OD       
MOLECULAR PCR

## 2021-10-28 NOTE — H&P ADULT - HISTORY OF PRESENT ILLNESS
41M w/ PMH Sickle Cell disease, gout, p/w generalized body pain x1 day. Pt notes he started to have LLE pain from his gout pain 2 days prior. He states today he woke up with severe body pain for which he took 2 tabs of oxy 30 which partially relieved his pain and he slept. He woke up and he was in severe pain which prompted him to call Dr. Jordan who advised him to come to the ED. Pt was recently discharged from sickle cell anemia requiring blood transfusion. Pt reports feeling unwell and having a cough the past few days. Pt denies fever, chest pain, loss of consciousness.

## 2021-10-28 NOTE — H&P ADULT - PROBLEM SELECTOR PLAN 4
h/o gout on allopurinol and colchicine  c/w colchicine for now  hold allopurinol as pt complaining of LLE Pain

## 2021-10-29 ENCOUNTER — TRANSCRIPTION ENCOUNTER (OUTPATIENT)
Age: 41
End: 2021-10-29

## 2021-10-29 VITALS
TEMPERATURE: 98 F | HEART RATE: 71 BPM | RESPIRATION RATE: 18 BRPM | SYSTOLIC BLOOD PRESSURE: 121 MMHG | DIASTOLIC BLOOD PRESSURE: 80 MMHG | OXYGEN SATURATION: 99 %

## 2021-10-29 LAB
ANION GAP SERPL CALC-SCNC: 5 MMOL/L — SIGNIFICANT CHANGE UP (ref 5–17)
BUN SERPL-MCNC: 17 MG/DL — SIGNIFICANT CHANGE UP (ref 7–18)
CALCIUM SERPL-MCNC: 8.7 MG/DL — SIGNIFICANT CHANGE UP (ref 8.4–10.5)
CHLORIDE SERPL-SCNC: 113 MMOL/L — HIGH (ref 96–108)
CO2 SERPL-SCNC: 24 MMOL/L — SIGNIFICANT CHANGE UP (ref 22–31)
COVID-19 SPIKE DOMAIN AB INTERP: POSITIVE
COVID-19 SPIKE DOMAIN ANTIBODY RESULT: >250 U/ML — HIGH
CREAT SERPL-MCNC: 0.88 MG/DL — SIGNIFICANT CHANGE UP (ref 0.5–1.3)
GLUCOSE SERPL-MCNC: 81 MG/DL — SIGNIFICANT CHANGE UP (ref 70–99)
HCT VFR BLD CALC: 22.2 % — LOW (ref 39–50)
HGB BLD-MCNC: 7.5 G/DL — LOW (ref 13–17)
MCHC RBC-ENTMCNC: 31.5 PG — SIGNIFICANT CHANGE UP (ref 27–34)
MCHC RBC-ENTMCNC: 33.8 GM/DL — SIGNIFICANT CHANGE UP (ref 32–36)
MCV RBC AUTO: 93.3 FL — SIGNIFICANT CHANGE UP (ref 80–100)
NRBC # BLD: 1 /100 WBCS — HIGH (ref 0–0)
PLATELET # BLD AUTO: 283 K/UL — SIGNIFICANT CHANGE UP (ref 150–400)
POTASSIUM SERPL-MCNC: 4.4 MMOL/L — SIGNIFICANT CHANGE UP (ref 3.5–5.3)
POTASSIUM SERPL-SCNC: 4.4 MMOL/L — SIGNIFICANT CHANGE UP (ref 3.5–5.3)
RBC # BLD: 2.38 M/UL — LOW (ref 4.2–5.8)
RBC # FLD: 18.4 % — HIGH (ref 10.3–14.5)
SARS-COV-2 IGG+IGM SERPL QL IA: >250 U/ML — HIGH
SARS-COV-2 IGG+IGM SERPL QL IA: POSITIVE
SODIUM SERPL-SCNC: 142 MMOL/L — SIGNIFICANT CHANGE UP (ref 135–145)
WBC # BLD: 11.22 K/UL — HIGH (ref 3.8–10.5)
WBC # FLD AUTO: 11.22 K/UL — HIGH (ref 3.8–10.5)

## 2021-10-29 PROCEDURE — 0225U NFCT DS DNA&RNA 21 SARSCOV2: CPT

## 2021-10-29 PROCEDURE — 96374 THER/PROPH/DIAG INJ IV PUSH: CPT

## 2021-10-29 PROCEDURE — 36415 COLL VENOUS BLD VENIPUNCTURE: CPT

## 2021-10-29 PROCEDURE — 83010 ASSAY OF HAPTOGLOBIN QUANT: CPT

## 2021-10-29 PROCEDURE — 86901 BLOOD TYPING SEROLOGIC RH(D): CPT

## 2021-10-29 PROCEDURE — 86850 RBC ANTIBODY SCREEN: CPT

## 2021-10-29 PROCEDURE — 83615 LACTATE (LD) (LDH) ENZYME: CPT

## 2021-10-29 PROCEDURE — 87040 BLOOD CULTURE FOR BACTERIA: CPT

## 2021-10-29 PROCEDURE — 99239 HOSP IP/OBS DSCHRG MGMT >30: CPT

## 2021-10-29 PROCEDURE — 71045 X-RAY EXAM CHEST 1 VIEW: CPT

## 2021-10-29 PROCEDURE — 83735 ASSAY OF MAGNESIUM: CPT

## 2021-10-29 PROCEDURE — 87635 SARS-COV-2 COVID-19 AMP PRB: CPT

## 2021-10-29 PROCEDURE — P9040: CPT

## 2021-10-29 PROCEDURE — 85610 PROTHROMBIN TIME: CPT

## 2021-10-29 PROCEDURE — 86769 SARS-COV-2 COVID-19 ANTIBODY: CPT

## 2021-10-29 PROCEDURE — 86900 BLOOD TYPING SEROLOGIC ABO: CPT

## 2021-10-29 PROCEDURE — 36430 TRANSFUSION BLD/BLD COMPNT: CPT

## 2021-10-29 PROCEDURE — 96375 TX/PRO/DX INJ NEW DRUG ADDON: CPT

## 2021-10-29 PROCEDURE — 85025 COMPLETE CBC W/AUTO DIFF WBC: CPT

## 2021-10-29 PROCEDURE — 85027 COMPLETE CBC AUTOMATED: CPT

## 2021-10-29 PROCEDURE — 80053 COMPREHEN METABOLIC PANEL: CPT

## 2021-10-29 PROCEDURE — 86870 RBC ANTIBODY IDENTIFICATION: CPT

## 2021-10-29 PROCEDURE — 80048 BASIC METABOLIC PNL TOTAL CA: CPT

## 2021-10-29 PROCEDURE — 85045 AUTOMATED RETICULOCYTE COUNT: CPT

## 2021-10-29 PROCEDURE — 87641 MR-STAPH DNA AMP PROBE: CPT

## 2021-10-29 PROCEDURE — 84145 PROCALCITONIN (PCT): CPT

## 2021-10-29 PROCEDURE — 85730 THROMBOPLASTIN TIME PARTIAL: CPT

## 2021-10-29 PROCEDURE — 99222 1ST HOSP IP/OBS MODERATE 55: CPT

## 2021-10-29 PROCEDURE — 86922 COMPATIBILITY TEST ANTIGLOB: CPT

## 2021-10-29 PROCEDURE — 99285 EMERGENCY DEPT VISIT HI MDM: CPT

## 2021-10-29 PROCEDURE — 84100 ASSAY OF PHOSPHORUS: CPT

## 2021-10-29 PROCEDURE — 87640 STAPH A DNA AMP PROBE: CPT

## 2021-10-29 RX ORDER — HYDROMORPHONE HYDROCHLORIDE 2 MG/ML
3 INJECTION INTRAMUSCULAR; INTRAVENOUS; SUBCUTANEOUS
Refills: 0 | Status: DISCONTINUED | OUTPATIENT
Start: 2021-10-29 | End: 2021-10-29

## 2021-10-29 RX ORDER — SODIUM CHLORIDE 9 MG/ML
1000 INJECTION INTRAMUSCULAR; INTRAVENOUS; SUBCUTANEOUS
Refills: 0 | Status: DISCONTINUED | OUTPATIENT
Start: 2021-10-29 | End: 2021-10-29

## 2021-10-29 RX ADMIN — HYDROMORPHONE HYDROCHLORIDE 2 MILLIGRAM(S): 2 INJECTION INTRAMUSCULAR; INTRAVENOUS; SUBCUTANEOUS at 00:35

## 2021-10-29 RX ADMIN — HYDROMORPHONE HYDROCHLORIDE 3 MILLIGRAM(S): 2 INJECTION INTRAMUSCULAR; INTRAVENOUS; SUBCUTANEOUS at 11:10

## 2021-10-29 RX ADMIN — SODIUM CHLORIDE 100 MILLILITER(S): 9 INJECTION INTRAMUSCULAR; INTRAVENOUS; SUBCUTANEOUS at 11:11

## 2021-10-29 RX ADMIN — HYDROMORPHONE HYDROCHLORIDE 2 MILLIGRAM(S): 2 INJECTION INTRAMUSCULAR; INTRAVENOUS; SUBCUTANEOUS at 01:23

## 2021-10-29 RX ADMIN — POLYETHYLENE GLYCOL 3350 17 GRAM(S): 17 POWDER, FOR SOLUTION ORAL at 11:11

## 2021-10-29 RX ADMIN — OXYCODONE HYDROCHLORIDE 30 MILLIGRAM(S): 5 TABLET ORAL at 08:03

## 2021-10-29 RX ADMIN — Medication 1 MILLIGRAM(S): at 11:11

## 2021-10-29 RX ADMIN — HYDROMORPHONE HYDROCHLORIDE 2 MILLIGRAM(S): 2 INJECTION INTRAMUSCULAR; INTRAVENOUS; SUBCUTANEOUS at 07:07

## 2021-10-29 RX ADMIN — PANTOPRAZOLE SODIUM 40 MILLIGRAM(S): 20 TABLET, DELAYED RELEASE ORAL at 07:47

## 2021-10-29 RX ADMIN — Medication 100 MILLIGRAM(S): at 11:11

## 2021-10-29 RX ADMIN — HYDROMORPHONE HYDROCHLORIDE 2 MILLIGRAM(S): 2 INJECTION INTRAMUSCULAR; INTRAVENOUS; SUBCUTANEOUS at 05:49

## 2021-10-29 RX ADMIN — Medication 0.6 MILLIGRAM(S): at 11:11

## 2021-10-29 RX ADMIN — HYDROMORPHONE HYDROCHLORIDE 3 MILLIGRAM(S): 2 INJECTION INTRAMUSCULAR; INTRAVENOUS; SUBCUTANEOUS at 14:31

## 2021-10-29 NOTE — PROGRESS NOTE ADULT - ATTENDING COMMENTS
Patient was seen and examined at bedside   Pain has significantly improved, denies any SOB, cough    All other review of systems negative, except as noted above    Vitals noted     Labs noted     A/P:  Sickle cell pain crisis  Acute symptomatic anemia due to sickle cell disease   Gout     Plan:  Pain well controlled  Hb stable after transfusion   Cont home meds for gout  Will follow up with Dr. Jordan as outpatient, to be started on Oxbryta   Stable for discharge, will resume home meds for pain Patient was seen and examined at bedside   Pain has significantly improved, denies any SOB, cough    All other review of systems negative, except as noted above    Vitals noted     P/E:  GENERAL: NAD, well-groomed, well-developed  HEAD:  Atraumatic, Normocephalic  NERVOUS SYSTEM:  Alert & Oriented X3, Good concentration; Motor Strength 5/5 B/L upper and lower extremities  CHEST/LUNG: Clear to auscultation bilaterally; No rales, rhonchi, wheezing, or rubs; med port in anterior chest wall  HEART: Regular rate and rhythm; No murmurs, rubs, or gallops  ABDOMEN: Soft, Nontender, Nondistended; Bowel sounds present  EXTREMITIES:  2+ Peripheral Pulses, No clubbing, cyanosis, or edema  LYMPH: No lymphadenopathy noted  SKIN: No rashes or lesions      Labs noted     A/P:  Sickle cell pain crisis  Acute symptomatic anemia due to sickle cell disease   Gout     Plan:  Pain well controlled  Hb stable after transfusion   Cont home meds for gout  Will follow up with Dr. Jordan as outpatient, to be started on Oxbryta   Stable for discharge, will resume home meds for pain

## 2021-10-29 NOTE — PROGRESS NOTE ADULT - ASSESSMENT
SSD  with baseline Hb 5-6, had 2 u 10 days ago was admitted for pain crisis and anemia.  he had 2 u PRBC.  no fever or chills or chest pain or sob    1 SSD  pain crisis  had 2 u PRBC  comfortable today  no ACS  still has pain at low back  can increase dilaudid to q 4h    2 anemia, H/H went up only for a few days after transfusion  he is supposed to begin oxbryta
41M w/ PMH Sickle Cell disease, gout, p/w generalized body pain x1 day admitted for sickle cell anemia and pain control.

## 2021-10-29 NOTE — DISCHARGE NOTE PROVIDER - CARE PROVIDER_API CALL
Chuck Jordan  INTERNAL MEDICINE  [address] [place] [address]  Phone: [phone]  Fax: [phone]  Follow Up Time:

## 2021-10-29 NOTE — CONSULT NOTE ADULT - ASSESSMENT
TOLU VARGAS is a 41y Male who presents with a chief complaint of sickle cell crisis.    Sickle Cell Pain Crisis  - Patient with known sickle cell disorder with baseline hemoglobin usually in the 5s.  - Patient is being transfused with blood. Avoid further transfusions unless symptomatic given his baseline levels.  - Continue pain control with hydromorphone (every 3-4 hours) IV. Can transition to oral PO oxycodone if pain is controlled with hydromorphone. Consult pain service.    We will continue to follow. Thank you for the consultation.    Bala Pan MD  Hematology/Oncology  C: [phone]  O: [phone]
he Drug Utilization Report below displays all of the controlled substance prescriptions, if any, that your patient has filled in the last twelve months. The information displayed on this report is compiled from pharmacy submissions to the Department, and accurately reflects the information as submitted by the pharmacies.    This report was requested by: Lora Rod | Reference #: [id number]    Others' Prescriptions  Patient Name: Alex Dillon Date: 1980  Address: [address] [place] [id number]: Male  Rx Written	Rx Dispensed	Drug	Quantity	Days Supply	Prescriber Name	Prescriber Rafaela #	Payment Method	Dispenser  10/07/2021	10/07/2021	oxycodone hcl 30 mg tablet	180	30	Jordan, Shirley SOLITARIO	[id number]	Insurance	[employer]   08/12/2021	08/12/2021	oxycodone hcl 30 mg tablet	180	30	Jordan, Shirley SOLITARIO	[id number]	Insurance	[employer]   07/15/2021	07/15/2021	oxycodone hcl 30 mg tablet	180	30	Jordan, Shirley [employer]ITARIO	[id number]	Insurance	[employer]   05/19/2021	05/19/2021	oxycodone hcl 30 mg tablet	180	30	Jordan, Shirley [employer]ITARIO	[id number]	Insurance	[employer]     Patient Name: Alex Dillon Date: 1980  Address: [address] [place] [id number]: Male  Rx Written	Rx Dispensed	Drug	Quantity	Days Supply	Prescriber Name	Prescriber Rafaela #	Payment Method	Dispenser  04/14/2021	04/14/2021	morphine sulfate ir 30 mg tab	24	7	Wilian Carty	[id number]	Insurance	[facility]

## 2021-10-29 NOTE — DISCHARGE NOTE NURSING/CASE MANAGEMENT/SOCIAL WORK - PATIENT PORTAL LINK FT
You can access the FollowMyHealth Patient Portal offered by Health system by registering at the following website: [email or web address]. By joining [employer]’s FollowMyHealth portal, you will also be able to view your health information using other applications (apps) compatible with our system.

## 2021-10-29 NOTE — CONSULT NOTE ADULT - PROBLEM SELECTOR RECOMMENDATION 9
Pt with acute sickle cell crisis.  h/h low - s/p 2 units blood. Retic % elevated  - pt states that pain is controlled and he wants to go home  - iv fluids  - can dc home on home regimen of oxycodone 30mg po  6 hours prn  - pt does not require pain meds on dc   - if pt stays in house - hydromorphone 3mg ivp q 3 hours prn with bowel regimen

## 2021-10-29 NOTE — DISCHARGE NOTE PROVIDER - NSDCCPCAREPLAN_GEN_ALL_CORE_FT
PRINCIPAL DISCHARGE DIAGNOSIS  Diagnosis: Sickle cell pain crisis  Assessment and Plan of Treatment:       SECONDARY DISCHARGE DIAGNOSES  Diagnosis: Gout  Assessment and Plan of Treatment:      PRINCIPAL DISCHARGE DIAGNOSIS  Diagnosis: Sickle cell pain crisis  Assessment and Plan of Treatment: Continue all your medications as recommended by your doctor   NSAIDs, such as ibuprofen, help decrease swelling, pain, and fever. This medicine is available with or without a doctor's order. NSAIDs can cause stomach bleeding or kidney problems in certain people.   Acetaminophen decreases pain and fever. It is available without a doctor's order. Do not use more than 4 grams (4,000 milligrams) total of acetaminophen in one day.   Prevent a sickle cell crisis:   Take vitamins and minerals as directed. Folic acid may help prevent blood vessel problems that can occur with sickle cell anemia. Zinc may decrease how often you have pain.  Drink liquids as directed. Dehydration can increase your risk for a sickle cell crisis. Ask how much liquid to drink each day and which liquids are best for you.  Balance rest and exercise. Rest during a sickle cell crisis. Over time, increase your activity to a moderate amount. Exercise as directed. Avoid exercise or activities that can cause injury, such as football. Ask about the best exercise plan for you.  Wash your hands frequently. Handwashing can help prevent illness. Wash your hands before you prepare or eat food, and after you use the bathroom.   Avoid quick changes in temperature. Do not go quickly from a warm place to a cold place.   Do not smoke cigarettes or drink alcohol. These increase your risk for a sickle cell crisis. Nicotine and other chemicals in cigarettes and cigars can cause lung damage.   Follow up with your doctor as directed: see Dr Jordan as scheduled   See your doctor immidiately if   - You have shortness of breath or chest pain.  - You are a man and have an erection that is painful and does not go away.  -You lose vision in one or both eyes.  -You have behavior changes, a seizure, or faint.  -You have a fever.  -You have abdominal pain, nausea, or vomiting.  -You have a different or worse headache.  -You have new weakness or numbness in your arm, leg, or face.  -You have new pain in any part of your body.  -Your urine is dark and you      SECONDARY DISCHARGE DIAGNOSES  Diagnosis: Viral illness  Assessment and Plan of Treatment: Parainfluenza is a very contagious type of viral respiratory infection. It is caused by any of the 4 kinds of human parainfluenza viruses. Parainfluenza is easily spread when an infected person coughs, sneezes, or has close contact with others  No specific treatment is available for a parainfluenza infection. You may need any of the following to relieve your symptoms: Acetaminophen, NSAIDs Rest as much as you can to help you recover.  Drink liquids as directed to help prevent dehydration. Use soap and water every time you wash your hands. Rub your soapy hands together, lacing your fingers. Use the fingers of one hand to scrub under the nails of the other hand. Wash for at least 20 seconds. Rinse with warm, running water for several seconds. Then dry your hands with a clean towel or paper towel. Use germ-killing hand  if soap and water are not available. Do not touch your eyes, nose, or mouth without washing your hands first. Cover a sneeze or cough. Use a tissue that covers your mouth and nose. Throw the tissue away in a trash can right away. Use the bend of your arm if a tissue is not available. Wash your hands well with soap and water or use a hand . Do not stand close to anyone who is sneezing or coughing.  Clean shared items with a germ-killing . Clean table surfaces, doorknobs, and light switches. Do not share towels, silverware, and dishes with people who are sick. Wash bed sheets, towels, silverware, and dishes with soap and water.Wear a mask over your mouth and nose if you are sick. The face mask may help protect others from becoming infected. Wear the mask when you are in common areas of your home or if you seek care from a healthcare provider.Stay away from others if you are sick. Stay at home until 24 hours after your fever and symptoms are gone.  Ask about the pneumococcal vaccine. Stay up to date on the pneumococcal vaccine.    Diagnosis: Gout  Assessment and Plan of Treatment: Continue all your medications as prescribed     PRINCIPAL DISCHARGE DIAGNOSIS  Diagnosis: Sickle cell pain crisis  Assessment and Plan of Treatment: Continue all your medications as recommended by your doctor   Prevent a sickle cell crisis:   Take vitamins and minerals as directed. Folic acid may help prevent blood vessel problems that can occur with sickle cell anemia. Zinc may decrease how often you have pain.  Drink liquids as directed. Dehydration can increase your risk for a sickle cell crisis. Ask how much liquid to drink each day and which liquids are best for you.  Balance rest and exercise. Rest during a sickle cell crisis. Over time, increase your activity to a moderate amount. Exercise as directed. Avoid exercise or activities that can cause injury, such as football. Ask about the best exercise plan for you.  Wash your hands frequently. Handwashing can help prevent illness. Wash your hands before you prepare or eat food, and after you use the bathroom.   Avoid quick changes in temperature. Do not go quickly from a warm place to a cold place.   Do not smoke cigarettes or drink alcohol. These increase your risk for a sickle cell crisis. Nicotine and other chemicals in cigarettes and cigars can cause lung damage.   Follow up with your doctor as directed: see Dr Jordan as scheduled   See your doctor immidiately if   - You have shortness of breath or chest pain.  - You are a man and have an erection that is painful and does not go away.  -You lose vision in one or both eyes.  -You have behavior changes, a seizure, or faint.  -You have a fever.  -You have abdominal pain, nausea, or vomiting.  -You have a different or worse headache.  -You have new weakness or numbness in your arm, leg, or face.  -You have new pain in any part of your body.  -Your urine is dark and you      SECONDARY DISCHARGE DIAGNOSES  Diagnosis: Gout  Assessment and Plan of Treatment: Continue all your medications as prescribed    Diagnosis: Viral illness  Assessment and Plan of Treatment: Parainfluenza is a very contagious type of viral respiratory infection. It is caused by any of the 4 kinds of human parainfluenza viruses. Parainfluenza is easily spread when an infected person coughs, sneezes, or has close contact with others  No specific treatment is available for a parainfluenza infection. You may need any of the following to relieve your symptoms: Acetaminophen, NSAIDs Rest as much as you can to help you recover.  Drink liquids as directed to help prevent dehydration. Use soap and water every time you wash your hands. Rub your soapy hands together, lacing your fingers. Use the fingers of one hand to scrub under the nails of the other hand. Wash for at least 20 seconds. Rinse with warm, running water for several seconds. Then dry your hands with a clean towel or paper towel. Use germ-killing hand  if soap and water are not available. Do not touch your eyes, nose, or mouth without washing your hands first. Cover a sneeze or cough. Use a tissue that covers your mouth and nose. Throw the tissue away in a trash can right away. Use the bend of your arm if a tissue is not available. Wash your hands well with soap and water or use a hand . Do not stand close to anyone who is sneezing or coughing.  Clean shared items with a germ-killing . Clean table surfaces, doorknobs, and light switches. Do not share towels, silverware, and dishes with people who are sick. Wash bed sheets, towels, silverware, and dishes with soap and water.Wear a mask over your mouth and nose if you are sick. The face mask may help protect others from becoming infected. Wear the mask when you are in common areas of your home or if you seek care from a healthcare provider.Stay away from others if you are sick. Stay at home until 24 hours after your fever and symptoms are gone.  Ask about the pneumococcal vaccine. Stay up to date on the pneumococcal vaccine.

## 2021-10-29 NOTE — DISCHARGE NOTE PROVIDER - HOSPITAL COURSE
41 year old male with a PMH of Sickle Cell Anemia, Gout, h/o COVID Infection 3/2020 presented with a  complaint of generalized body pain, cough, chest pain, not relieved with pain meds at home   In ED: Hg 5.4, WBC 14, Absolute retic 223, retic percent 14.   CXR Mild increased interstitial lung markings, procalcitonin 0.05  Admitted for sickle cell anemia and pain management   Received blood transfusion two units of PRBC   Hemoglobin increased to 7.6  Hematology  Dr Jordan followed pt while admitted   Pain management consulted   Pain was managed with Hydromorphone 3 mg IV q 3 hrs, oxycodone 30 mg q 4 hrs   Patient was also found to be positive for parainfluenza, treated symptomatically. Contact isolation maintained   Pain was well managed with above regimen   Given patient's improved clinical status and current hemodynamic stability, decision was made to discharge.  Please refer to patient's complete medical chart with documents for a full hospital course, for this is only a brief summary.

## 2021-10-29 NOTE — DISCHARGE NOTE PROVIDER - NSDCMRMEDTOKEN_GEN_ALL_CORE_FT
allopurinol 100 mg oral tablet: orally once a day  colchicine 0.6 mg oral tablet: 1 tab(s) orally once a day  deferasirox 360 mg oral tablet: 1 cap(s) orally 2 times a day  folic acid 1 mg oral tablet: 2 tab(s) orally once a day  omeprazole 40 mg oral delayed release capsule: 1 cap(s) orally once a day  oxyCODONE 30 mg oral tablet: 1 tab(s) orally every 4 hours, As needed, Severe Pain (7 - 10)  polyethylene glycol 3350 oral powder for reconstitution: 17 gram(s) orally once a day  senna oral tablet: 2 tab(s) orally once a day (at bedtime)

## 2021-10-29 NOTE — CONSULT NOTE ADULT - SUBJECTIVE AND OBJECTIVE BOX
CHIEF COMPLAINT  sickle cell crisis    HISTORY OF PRESENT ILLNESS  TOLU VARGAS is a 41y Male who presents with a chief complaint of sickle cell crisis.    Patient presents with pain all over. He has history of sickle cell disease with baseline hemoglobin in the 5s. He states that oxycodone at home only partially relieved the pain and when severe pain returned he was told by Dr. Jordan to go to the Emergency Department. Otherwise denies fevers, chills, chest pain.     PAST MEDICAL AND SURGICAL HISTORY  Sickle Cell  Gout    FAMILY HISTORY  Sickle Cell    SOCIAL HISTORY  Denies tobacco or alcohol use    REVIEW OF SYSTEMS  A complete review of systems was performed; negative except per HPI    PHYSICAL EXAM  T(C): 36.4 (10-28-21 @ 07:15), Max: 36.9 (10-28-21 @ 01:01)  HR: 74 (10-28-21 @ 07:15) (74 - 86)  BP: 115/78 (10-28-21 @ 07:15) (110/71 - 128/80)  RR: 18 (10-28-21 @ 07:15) (18 - 18)  SpO2: 96% (10-28-21 @ 07:15) (95% - 96%)  Constitutional: alert, awake, in no acute distress  Eyes: PERRL, EOMI  HEENT: normocephalic, atraumatic  Neck: supple, non-tender  Cardiovascular: normal perfusion, no peripheral edema  Respiratory: normal respiratory efforts; no increased use of accessory muscles  Gastrointestinal: soft, non-tender  Musculoskeletal: normal range of motion, no deformities noted  Neurological: alert, oriented * 4, no focal deficits, CN II to XI grossly intact  Skin: warm, dry    LABORATORY DATA                        5.2    13.37 )-----------( 297      ( 28 Oct 2021 05:25 )             15.1     10-28    142  |  112<H>  |  22<H>  ----------------------------<  100<H>  3.9   |  22  |  0.95    Ca    8.1<L>      28 Oct 2021 05:25  Phos  4.2     10-28  Mg     1.9     10-28    TPro  6.6  /  Alb  3.1<L>  /  TBili  4.8<H>  /  DBili  x   /  AST  62<H>  /  ALT  56  /  AlkPhos  133<H>  10-28  
Source of information: , Chart review, patient  Patient language: English  : n/a    CC: Patient is a 41y old  Male who presents with a chief complaint of Anemia (29 Oct 2021 11:13)      HPI:  41M w/ PMH Sickle Cell disease, gout, p/w generalized body pain x1 day. Pt notes he started to have LLE pain from his gout pain 2 days prior. He states today he woke up with severe body pain for which he took 2 tabs of oxy 30 which partially relieved his pain and he slept. He woke up and he was in severe pain which prompted him to call Dr. Jordan who advised him to come to the ED. Pt was recently discharged from sickle cell anemia requiring blood transfusion. Pt reports feeling unwell and having a cough the past few days. Pt denies fever, chest pain, loss of consciousness. (28 Oct 2021 03:45)      Pt with acute sickle crisis.  + chronic pain  Pt is on oxycodone 30mg po prn at home.  + joint pain + coughs + parainfluenza.  Pt h/h low - s/p 2 unit prbc.  Pt states that he feels good and will go home.      PAIN SCORE:  3/10     SCALE USED: (1-10 VNRS)    PAST MEDICAL & SURGICAL HISTORY:  Sickle cell anemia    Gout    Anemia requiring transfusions    Marijuana abuse    Pneumonia    History of cholecystectomy        FAMILY HISTORY:  Family history of sickle cell trait (Father, Mother)          SOCIAL HISTORY:  [ x] Denies Smoking, Alcohol, or Drug Use    Allergies    ceftriaxone (Anaphylaxis)  hydroxyurea (Other)  penicillin (Pruritus)  piperacillin-tazobactam (Urticaria)    Intolerances        MEDICATIONS:    MEDICATIONS  (STANDING):  allopurinol 100 milliGRAM(s) Oral daily  colchicine 0.6 milliGRAM(s) Oral daily  folic acid 1 milliGRAM(s) Oral daily  pantoprazole    Tablet 40 milliGRAM(s) Oral before breakfast  polyethylene glycol 3350 17 Gram(s) Oral daily  senna 2 Tablet(s) Oral at bedtime  sodium chloride 0.9%. 1000 milliLiter(s) (100 mL/Hr) IV Continuous <Continuous>    MEDICATIONS  (PRN):  HYDROmorphone  Injectable 3 milliGRAM(s) IV Push every 3 hours PRN Severe Pain (7 - 10)  oxyCODONE    IR 30 milliGRAM(s) Oral every 4 hours PRN Moderate Pain (4 - 6)      Vital Signs Last 24 Hrs  T(C): 36.9 (29 Oct 2021 12:43), Max: 36.9 (29 Oct 2021 12:43)  T(F): 98.4 (29 Oct 2021 12:43), Max: 98.4 (29 Oct 2021 12:43)  HR: 71 (29 Oct 2021 12:43) (71 - 78)  BP: 121/80 (29 Oct 2021 12:43) (121/80 - 143/89)  BP(mean): 84 (28 Oct 2021 21:52) (84 - 84)  RR: 18 (29 Oct 2021 12:43) (17 - 18)  SpO2: 99% (29 Oct 2021 12:43) (96% - 99%)    LABS:                          7.5    11.22 )-----------( 283      ( 29 Oct 2021 06:18 )             22.2     10-29    142  |  113<H>  |  17  ----------------------------<  81  4.4   |  24  |  0.88    Ca    8.7      29 Oct 2021 06:18  Phos  4.2     10-28  Mg     1.9     10-28    TPro  6.6  /  Alb  3.1<L>  /  TBili  4.8<H>  /  DBili  x   /  AST  62<H>  /  ALT  56  /  AlkPhos  133<H>  10-28    PT/INR - ( 28 Oct 2021 01:55 )   PT: 13.2 sec;   INR: 1.12 ratio         PTT - ( 28 Oct 2021 01:55 )  PTT:27.6 sec  LIVER FUNCTIONS - ( 28 Oct 2021 05:25 )  Alb: 3.1 g/dL / Pro: 6.6 g/dL / ALK PHOS: 133 U/L / ALT: 56 U/L DA / AST: 62 U/L / GGT: x             CAPILLARY BLOOD GLUCOSE          REVIEW OF SYSTEMS:  CONSTITUTIONAL: No fever or fatigue + yellowing of eyes   RESPIRATORY: No cough, wheezing, chills or hemoptysis; No shortness of breath  CARDIOVASCULAR: No chest pain, palpitations, dizziness, or leg swelling  GASTROINTESTINAL: No abdominal or epigastric pain. No nausea, vomiting; No diarrhea or constipation.   GENITOURINARY: No dysuria, frequency, hematuria, retention or incontinence  MUSCULOSKELETAL:+ joint pain   NEURO: No weakness, no numbness   PSYCHIATRIC: No depression, anxiety, mood swings, or difficulty sleeping    PHYSICAL EXAM:  GENERAL:  Alert & Oriented X3, NAD, + icterus   CHEST/LUNG: Clear to auscultation bilaterally; No rales, rhonchi, wheezing, or rubs  HEART: Regular rate and rhythm; No murmurs, rubs, or gallops  ABDOMEN: Soft, Nontender, Nondistended; Bowel sounds present  : no incontinence, no flank pain  EXTREMITIES:  2+ Peripheral Pulses, No cyanosis, or edema  MUSCULOSKELETAL: + joint pain   NEUROLOGICAL: awake and alert and oriented   SKIN: No rashes or lesions    Radiology:  < from: Xray Chest 1 View- PORTABLE-Routine (Xray Chest 1 View- PORTABLE-Routine .) (10.28.21 @ 10:41) >    EXAM:  XR CHEST PORTABLE ROUTINE 1V                            PROCEDURE DATE:  10/28/2021          INTERPRETATION:  CLINICAL STATEMENT: Follow-up chest pain.    TECHNIQUE: AP view of the chest.    COMPARISON: 10/7/2021    FINDINGS/  IMPRESSION:  Right Mediport again noted. Mild increased interstitial lung markings. Atelectasis lung bases.    Heart size cannot be accurately assessed in this projection, but appear enlarged.    < end of copied text >      Drug Screen:          ORT Score   Family Hx of substance abuse	Female	Male  Alcohol 	                                              1              3  Illegal drugs	                                      2              3  Rx drugs                                               4	      4    Personal Hx of substance abuse		  Alcohol 	                                               3	      3  Illegal drugs                                  	       4	      4  Rx drugs                                                5	      5  Age between 16- 45 years	               1             1  hx preadolescent sexual abuse	       3	      0  Psychological disease		  ADD, OCD, bipolar, schizophrenia	2	      2  Depression                                    	1	      1  Score totals 		  		  a score of 3 or lower indicates low risk for opioid abuse		  a score of 4-7 indicates moderate risk for opioid abuse		  a score of 8 or higher indicates high risk for opioid abuse	    Risk factors associated with adverse outcomes related to opioid treatment  [ ]  Concurrent benzodiazepine use  [ ]  History/ Active substance use or alcohol use disorder  [ ] Psychiatric co-morbidity  [ ] Sleep apnea  [ ] COPD  [ ] BMI> 35  [ ] Liver dysfunction  [ ] Renal dysfunction  [ ] CHF  [ ] Smoker  [ ]  Age > 60 years      x]  [place]  Reviewed and Copied to Chart. See below.

## 2021-10-29 NOTE — PROGRESS NOTE ADULT - SUBJECTIVE AND OBJECTIVE BOX
HPI:  41M w/ PMH Sickle Cell disease, gout, p/w generalized body pain x1 day. Pt notes he started to have LLE pain from his gout pain 2 days prior. He states today he woke up with severe body pain for which he took 2 tabs of oxy 30 which partially relieved his pain and he slept. He woke up and he was in severe pain which prompted him to call Dr. Jordan who advised him to come to the ED. Pt was recently discharged from sickle cell anemia requiring blood transfusion. Pt reports feeling unwell and having a cough the past few days. Pt denies fever, chest pain, loss of consciousness. (28 Oct 2021 03:45)     Pt is seen and examined  pt is awake and lying in bed/out of bed to chair  pt seems comfortable and denies any complaints at this time    ROS:  Negative except for:    MEDICATIONS  (STANDING):  allopurinol 100 milliGRAM(s) Oral daily  colchicine 0.6 milliGRAM(s) Oral daily  folic acid 1 milliGRAM(s) Oral daily  pantoprazole    Tablet 40 milliGRAM(s) Oral before breakfast  polyethylene glycol 3350 17 Gram(s) Oral daily  senna 2 Tablet(s) Oral at bedtime    MEDICATIONS  (PRN):  HYDROmorphone  Injectable 3 milliGRAM(s) IV Push every 3 hours PRN Severe Pain (7 - 10)  oxyCODONE    IR 30 milliGRAM(s) Oral every 4 hours PRN Moderate Pain (4 - 6)      Allergies    ceftriaxone (Anaphylaxis)  hydroxyurea (Other)  penicillin (Pruritus)  piperacillin-tazobactam (Urticaria)    Intolerances        Vital Signs Last 24 Hrs  T(C): 36.3 (29 Oct 2021 05:12), Max: 36.7 (28 Oct 2021 11:15)  T(F): 97.4 (29 Oct 2021 05:12), Max: 98 (28 Oct 2021 11:15)  HR: 77 (29 Oct 2021 05:12) (63 - 78)  BP: 133/73 (29 Oct 2021 05:12) (122/65 - 143/89)  BP(mean): 84 (28 Oct 2021 21:52) (84 - 84)  RR: 17 (29 Oct 2021 05:12) (16 - 18)  SpO2: 96% (29 Oct 2021 05:12) (96% - 98%)    PHYSICAL EXAM  General: adult in NAD  HEENT: clear oropharynx, anicteric sclera, pink conjunctiva  Neck: supple  CV: normal S1/S2 with no murmur rubs or gallops  Lungs: positive air movement b/l ant lungs,clear to auscultation, no wheezes, no rales  Abdomen: soft non-tender non-distended, no hepatosplenomegaly  Ext: no clubbing cyanosis or edema  Skin: no rashes and no petechiae  Neuro: alert and oriented X 4, no focal deficits  LABS:                          7.5    11.22 )-----------( 283      ( 29 Oct 2021 06:18 )             22.2         Mean Cell Volume : 93.3 fl  Mean Cell Hemoglobin : 31.5 pg  Mean Cell Hemoglobin Concentration : 33.8 gm/dL  Auto Neutrophil # : x  Auto Lymphocyte # : x  Auto Monocyte # : x  Auto Eosinophil # : x  Auto Basophil # : x  Auto Neutrophil % : x  Auto Lymphocyte % : x  Auto Monocyte % : x  Auto Eosinophil % : x  Auto Basophil % : x    Serial CBC  Hematocrit 22.2  Hemoglobin 7.5  Plat 283  RBC 2.38  WBC 11.22  Serial CBC  Hematocrit 22.6  Hemoglobin 7.6  Plat 297  RBC 2.43  WBC 12.03  Serial CBC  Hematocrit 15.1  Hemoglobin 5.2  Plat 297  RBC 1.60  WBC 13.37  Serial CBC  Hematocrit 15.6  Hemoglobin 5.4  Plat 308  RBC 1.66  WBC 14.44    10-29    142  |  113<H>  |  17  ----------------------------<  81  4.4   |  24  |  0.88    Ca    8.7      29 Oct 2021 06:18  Phos  4.2     10-28  Mg     1.9     10-28    TPro  6.6  /  Alb  3.1<L>  /  TBili  4.8<H>  /  DBili  x   /  AST  62<H>  /  ALT  56  /  AlkPhos  133<H>  10-28      PT/INR - ( 28 Oct 2021 01:55 )   PT: 13.2 sec;   INR: 1.12 ratio         PTT - ( 28 Oct 2021 01:55 )  PTT:27.6 sec    Reticulocyte Percent: 14.0 % (10-28 @ 05:25)            BLOOD SMEAR INTERPRETATION:       RADIOLOGY & ADDITIONAL STUDIES:    
-*-* INCOMPLETE

## 2021-10-30 LAB
MRSA PCR RESULT.: SIGNIFICANT CHANGE UP
S AUREUS DNA NOSE QL NAA+PROBE: DETECTED

## 2021-11-02 LAB
CULTURE RESULTS: SIGNIFICANT CHANGE UP
CULTURE RESULTS: SIGNIFICANT CHANGE UP
SPECIMEN SOURCE: SIGNIFICANT CHANGE UP
SPECIMEN SOURCE: SIGNIFICANT CHANGE UP

## 2021-12-21 NOTE — ED ADULT NURSE NOTE - SUICIDE SCREENING DEPRESSION
Detail Level: Detailed
Render Note In Bullet Format When Appropriate: No
Consent: The patient's consent was obtained including but not limited to risks of crusting, scabbing, blistering, scarring, darker or lighter pigmentary change, recurrence, incomplete removal and infection.
Render Post-Care Instructions In Note?: yes
Post-Care Instructions: I reviewed with the patient in detail post-care instructions. Patient is to wear sunprotection, and avoid picking at any of the treated lesions. Pt may apply Vaseline to crusted or scabbing areas.
Number Of Freeze-Thaw Cycles: 1 freeze-thaw cycle
Duration Of Freeze Thaw-Cycle (Seconds): 1
Negative
normal...

## 2022-02-24 ENCOUNTER — INPATIENT (INPATIENT)
Facility: HOSPITAL | Age: 42
LOS: 1 days | Discharge: HOME | DRG: 812 | End: 2022-02-26
Attending: INTERNAL MEDICINE | Admitting: INTERNAL MEDICINE
Payer: MEDICAID

## 2022-02-24 VITALS
HEART RATE: 103 BPM | RESPIRATION RATE: 22 BRPM | TEMPERATURE: 98 F | HEIGHT: 66.93 IN | SYSTOLIC BLOOD PRESSURE: 129 MMHG | WEIGHT: 194.01 LBS | OXYGEN SATURATION: 87 % | DIASTOLIC BLOOD PRESSURE: 66 MMHG

## 2022-02-24 DIAGNOSIS — Z90.49 ACQUIRED ABSENCE OF OTHER SPECIFIED PARTS OF DIGESTIVE TRACT: Chronic | ICD-10-CM

## 2022-02-24 LAB
ALBUMIN SERPL ELPH-MCNC: 3.3 G/DL — LOW (ref 3.5–5)
ALP SERPL-CCNC: 137 U/L — HIGH (ref 40–120)
ALT FLD-CCNC: 54 U/L DA — SIGNIFICANT CHANGE UP (ref 10–60)
ANION GAP SERPL CALC-SCNC: 5 MMOL/L — SIGNIFICANT CHANGE UP (ref 5–17)
APTT BLD: 34.9 SEC — SIGNIFICANT CHANGE UP (ref 27.5–35.5)
AST SERPL-CCNC: 124 U/L — HIGH (ref 10–40)
BASOPHILS # BLD AUTO: 0 K/UL — SIGNIFICANT CHANGE UP (ref 0–0.2)
BASOPHILS NFR BLD AUTO: 0 % — SIGNIFICANT CHANGE UP (ref 0–2)
BILIRUB SERPL-MCNC: 7.5 MG/DL — HIGH (ref 0.2–1.2)
BUN SERPL-MCNC: 27 MG/DL — HIGH (ref 7–18)
CALCIUM SERPL-MCNC: 8.8 MG/DL — SIGNIFICANT CHANGE UP (ref 8.4–10.5)
CHLORIDE SERPL-SCNC: 109 MMOL/L — HIGH (ref 96–108)
CO2 SERPL-SCNC: 24 MMOL/L — SIGNIFICANT CHANGE UP (ref 22–31)
CREAT SERPL-MCNC: 1.27 MG/DL — SIGNIFICANT CHANGE UP (ref 0.5–1.3)
EOSINOPHIL # BLD AUTO: 0.56 K/UL — HIGH (ref 0–0.5)
EOSINOPHIL NFR BLD AUTO: 5 % — SIGNIFICANT CHANGE UP (ref 0–6)
GLUCOSE SERPL-MCNC: 95 MG/DL — SIGNIFICANT CHANGE UP (ref 70–99)
HCT VFR BLD CALC: 12.3 % — CRITICAL LOW (ref 39–50)
HGB BLD-MCNC: 4.4 G/DL — CRITICAL LOW (ref 13–17)
INR BLD: 1.12 RATIO — SIGNIFICANT CHANGE UP (ref 0.88–1.16)
LYMPHOCYTES # BLD AUTO: 19 % — SIGNIFICANT CHANGE UP (ref 13–44)
LYMPHOCYTES # BLD AUTO: 2.12 K/UL — SIGNIFICANT CHANGE UP (ref 1–3.3)
MCHC RBC-ENTMCNC: 32.8 PG — SIGNIFICANT CHANGE UP (ref 27–34)
MCHC RBC-ENTMCNC: 35.8 GM/DL — SIGNIFICANT CHANGE UP (ref 32–36)
MCV RBC AUTO: 91.8 FL — SIGNIFICANT CHANGE UP (ref 80–100)
MONOCYTES # BLD AUTO: 0.67 K/UL — SIGNIFICANT CHANGE UP (ref 0–0.9)
MONOCYTES NFR BLD AUTO: 6 % — SIGNIFICANT CHANGE UP (ref 2–14)
NEUTROPHILS # BLD AUTO: 6.91 K/UL — SIGNIFICANT CHANGE UP (ref 1.8–7.4)
NEUTROPHILS NFR BLD AUTO: 61 % — SIGNIFICANT CHANGE UP (ref 43–77)
PLATELET # BLD AUTO: 231 K/UL — SIGNIFICANT CHANGE UP (ref 150–400)
POTASSIUM SERPL-MCNC: 4.7 MMOL/L — SIGNIFICANT CHANGE UP (ref 3.5–5.3)
POTASSIUM SERPL-SCNC: 4.7 MMOL/L — SIGNIFICANT CHANGE UP (ref 3.5–5.3)
PROT SERPL-MCNC: 7.5 G/DL — SIGNIFICANT CHANGE UP (ref 6–8.3)
PROTHROM AB SERPL-ACNC: 13.3 SEC — SIGNIFICANT CHANGE UP (ref 10.5–13.4)
RBC # BLD: 1.34 M/UL — LOW (ref 4.2–5.8)
RBC # BLD: 1.34 M/UL — LOW (ref 4.2–5.8)
RBC # FLD: 24.1 % — HIGH (ref 10.3–14.5)
RETICS #: 167.8 K/UL — HIGH (ref 25–125)
RETICS/RBC NFR: 12.5 % — HIGH (ref 0.5–2.5)
SODIUM SERPL-SCNC: 138 MMOL/L — SIGNIFICANT CHANGE UP (ref 135–145)
WBC # BLD: 11.14 K/UL — HIGH (ref 3.8–10.5)
WBC # FLD AUTO: 11.14 K/UL — HIGH (ref 3.8–10.5)

## 2022-02-24 PROCEDURE — 99285 EMERGENCY DEPT VISIT HI MDM: CPT

## 2022-02-24 PROCEDURE — 93010 ELECTROCARDIOGRAM REPORT: CPT

## 2022-02-24 PROCEDURE — 71045 X-RAY EXAM CHEST 1 VIEW: CPT | Mod: 26

## 2022-02-24 RX ORDER — ONDANSETRON 8 MG/1
4 TABLET, FILM COATED ORAL ONCE
Refills: 0 | Status: COMPLETED | OUTPATIENT
Start: 2022-02-24 | End: 2022-02-24

## 2022-02-24 RX ORDER — SODIUM CHLORIDE 9 MG/ML
1000 INJECTION INTRAMUSCULAR; INTRAVENOUS; SUBCUTANEOUS ONCE
Refills: 0 | Status: COMPLETED | OUTPATIENT
Start: 2022-02-24 | End: 2022-02-24

## 2022-02-24 RX ORDER — HYDROMORPHONE HYDROCHLORIDE 2 MG/ML
2 INJECTION INTRAMUSCULAR; INTRAVENOUS; SUBCUTANEOUS ONCE
Refills: 0 | Status: DISCONTINUED | OUTPATIENT
Start: 2022-02-24 | End: 2022-02-24

## 2022-02-24 NOTE — ED PROVIDER NOTE - CLINICAL SUMMARY MEDICAL DECISION MAKING FREE TEXT BOX
41 year old male with sickle cell crisis and anemia. PE as above.  labs, cxr, pain control, fluid bolus, likely admission

## 2022-02-24 NOTE — ED PROVIDER NOTE - ENMT NEGATIVE STATEMENT, MLM
Ears: no ear pain and no hearing problems. Nose: no nasal congestion and no nasal drainage. Mouth/Throat: no dysphagia, no hoarseness and no throat pain. Neck: no lumps, no pain, no stiffness and no swollen glands.
never

## 2022-02-24 NOTE — ED PROVIDER NOTE - OBJECTIVE STATEMENT
41 year old male PMH sickle cell, gout coming in with sickle cell pain crisis and low HGB on outpatient labs (5) so sent into the ed for eval. last transfusion was in october 2021. states took oxycodone at home with minimal improvement of pain. denies all other complaints.

## 2022-02-24 NOTE — ED ADULT NURSE NOTE - OBJECTIVE STATEMENT
Pt presents to the ED with c/o abnormal lab result, hgb 5/hct 13.4 and back pain. Denies nausea, vomiting, SOB, or chest pain.

## 2022-02-25 ENCOUNTER — TRANSCRIPTION ENCOUNTER (OUTPATIENT)
Age: 42
End: 2022-02-25

## 2022-02-25 DIAGNOSIS — D64.9 ANEMIA, UNSPECIFIED: ICD-10-CM

## 2022-02-25 DIAGNOSIS — R07.9 CHEST PAIN, UNSPECIFIED: ICD-10-CM

## 2022-02-25 DIAGNOSIS — D57.00 HB-SS DISEASE WITH CRISIS, UNSPECIFIED: ICD-10-CM

## 2022-02-25 DIAGNOSIS — M10.9 GOUT, UNSPECIFIED: ICD-10-CM

## 2022-02-25 DIAGNOSIS — Z29.9 ENCOUNTER FOR PROPHYLACTIC MEASURES, UNSPECIFIED: ICD-10-CM

## 2022-02-25 DIAGNOSIS — M54.9 DORSALGIA, UNSPECIFIED: ICD-10-CM

## 2022-02-25 LAB
ALBUMIN SERPL ELPH-MCNC: 3.2 G/DL — LOW (ref 3.5–5)
ALP SERPL-CCNC: 127 U/L — HIGH (ref 40–120)
ALT FLD-CCNC: 51 U/L DA — SIGNIFICANT CHANGE UP (ref 10–60)
ANION GAP SERPL CALC-SCNC: 4 MMOL/L — LOW (ref 5–17)
AST SERPL-CCNC: 113 U/L — HIGH (ref 10–40)
BASOPHILS # BLD AUTO: 0.13 K/UL — SIGNIFICANT CHANGE UP (ref 0–0.2)
BASOPHILS NFR BLD AUTO: 1.4 % — SIGNIFICANT CHANGE UP (ref 0–2)
BILIRUB DIRECT SERPL-MCNC: 3 MG/DL — HIGH (ref 0–0.3)
BILIRUB INDIRECT FLD-MCNC: 3.6 MG/DL — HIGH (ref 0.2–1)
BILIRUB SERPL-MCNC: 6.6 MG/DL — HIGH (ref 0.2–1.2)
BILIRUB SERPL-MCNC: 6.6 MG/DL — HIGH (ref 0.2–1.2)
BUN SERPL-MCNC: 24 MG/DL — HIGH (ref 7–18)
CALCIUM SERPL-MCNC: 8.6 MG/DL — SIGNIFICANT CHANGE UP (ref 8.4–10.5)
CHLORIDE SERPL-SCNC: 113 MMOL/L — HIGH (ref 96–108)
CO2 SERPL-SCNC: 23 MMOL/L — SIGNIFICANT CHANGE UP (ref 22–31)
CREAT SERPL-MCNC: 0.86 MG/DL — SIGNIFICANT CHANGE UP (ref 0.5–1.3)
EOSINOPHIL # BLD AUTO: 0.41 K/UL — SIGNIFICANT CHANGE UP (ref 0–0.5)
EOSINOPHIL NFR BLD AUTO: 4.4 % — SIGNIFICANT CHANGE UP (ref 0–6)
GLUCOSE SERPL-MCNC: 91 MG/DL — SIGNIFICANT CHANGE UP (ref 70–99)
HCT VFR BLD CALC: 17.9 % — CRITICAL LOW (ref 39–50)
HGB BLD-MCNC: 6.4 G/DL — CRITICAL LOW (ref 13–17)
IMM GRANULOCYTES NFR BLD AUTO: 0.6 % — SIGNIFICANT CHANGE UP (ref 0–1.5)
LYMPHOCYTES # BLD AUTO: 3.77 K/UL — HIGH (ref 1–3.3)
LYMPHOCYTES # BLD AUTO: 40.7 % — SIGNIFICANT CHANGE UP (ref 13–44)
MAGNESIUM SERPL-MCNC: 2.1 MG/DL — SIGNIFICANT CHANGE UP (ref 1.6–2.6)
MCHC RBC-ENTMCNC: 32.5 PG — SIGNIFICANT CHANGE UP (ref 27–34)
MCHC RBC-ENTMCNC: 35.8 GM/DL — SIGNIFICANT CHANGE UP (ref 32–36)
MCV RBC AUTO: 90.9 FL — SIGNIFICANT CHANGE UP (ref 80–100)
MONOCYTES # BLD AUTO: 1.41 K/UL — HIGH (ref 0–0.9)
MONOCYTES NFR BLD AUTO: 15.2 % — HIGH (ref 2–14)
NEUTROPHILS # BLD AUTO: 3.48 K/UL — SIGNIFICANT CHANGE UP (ref 1.8–7.4)
NEUTROPHILS NFR BLD AUTO: 37.7 % — LOW (ref 43–77)
NRBC # BLD: 1 /100 WBCS — HIGH (ref 0–0)
PHOSPHATE SERPL-MCNC: 3.7 MG/DL — SIGNIFICANT CHANGE UP (ref 2.5–4.5)
PLATELET # BLD AUTO: 222 K/UL — SIGNIFICANT CHANGE UP (ref 150–400)
POTASSIUM SERPL-MCNC: 4.8 MMOL/L — SIGNIFICANT CHANGE UP (ref 3.5–5.3)
POTASSIUM SERPL-SCNC: 4.8 MMOL/L — SIGNIFICANT CHANGE UP (ref 3.5–5.3)
PROT SERPL-MCNC: 6.9 G/DL — SIGNIFICANT CHANGE UP (ref 6–8.3)
RBC # BLD: 1.97 M/UL — LOW (ref 4.2–5.8)
RBC # FLD: 20.4 % — HIGH (ref 10.3–14.5)
SARS-COV-2 RNA SPEC QL NAA+PROBE: SIGNIFICANT CHANGE UP
SODIUM SERPL-SCNC: 140 MMOL/L — SIGNIFICANT CHANGE UP (ref 135–145)
WBC # BLD: 9.26 K/UL — SIGNIFICANT CHANGE UP (ref 3.8–10.5)
WBC # FLD AUTO: 9.26 K/UL — SIGNIFICANT CHANGE UP (ref 3.8–10.5)

## 2022-02-25 PROCEDURE — 99223 1ST HOSP IP/OBS HIGH 75: CPT | Mod: GC

## 2022-02-25 PROCEDURE — 12345: CPT | Mod: NC

## 2022-02-25 PROCEDURE — 99222 1ST HOSP IP/OBS MODERATE 55: CPT

## 2022-02-25 RX ORDER — PANTOPRAZOLE SODIUM 20 MG/1
40 TABLET, DELAYED RELEASE ORAL
Refills: 0 | Status: DISCONTINUED | OUTPATIENT
Start: 2022-02-25 | End: 2022-02-26

## 2022-02-25 RX ORDER — COLCHICINE 0.6 MG
0.6 TABLET ORAL DAILY
Refills: 0 | Status: DISCONTINUED | OUTPATIENT
Start: 2022-02-25 | End: 2022-02-26

## 2022-02-25 RX ORDER — DEFERASIROX 180 MG/1
1 GRANULE ORAL
Qty: 0 | Refills: 0 | DISCHARGE

## 2022-02-25 RX ORDER — LANOLIN ALCOHOL/MO/W.PET/CERES
3 CREAM (GRAM) TOPICAL AT BEDTIME
Refills: 0 | Status: DISCONTINUED | OUTPATIENT
Start: 2022-02-25 | End: 2022-02-26

## 2022-02-25 RX ORDER — ALLOPURINOL 300 MG
100 TABLET ORAL DAILY
Refills: 0 | Status: DISCONTINUED | OUTPATIENT
Start: 2022-02-25 | End: 2022-02-25

## 2022-02-25 RX ORDER — POLYETHYLENE GLYCOL 3350 17 G/17G
17 POWDER, FOR SOLUTION ORAL DAILY
Refills: 0 | Status: DISCONTINUED | OUTPATIENT
Start: 2022-02-25 | End: 2022-02-26

## 2022-02-25 RX ORDER — HYDROMORPHONE HYDROCHLORIDE 2 MG/ML
2 INJECTION INTRAMUSCULAR; INTRAVENOUS; SUBCUTANEOUS EVERY 4 HOURS
Refills: 0 | Status: DISCONTINUED | OUTPATIENT
Start: 2022-02-25 | End: 2022-02-26

## 2022-02-25 RX ORDER — ACETAMINOPHEN 500 MG
650 TABLET ORAL ONCE
Refills: 0 | Status: COMPLETED | OUTPATIENT
Start: 2022-02-25 | End: 2022-02-25

## 2022-02-25 RX ORDER — HYDROMORPHONE HYDROCHLORIDE 2 MG/ML
3 INJECTION INTRAMUSCULAR; INTRAVENOUS; SUBCUTANEOUS
Refills: 0 | Status: DISCONTINUED | OUTPATIENT
Start: 2022-02-25 | End: 2022-02-25

## 2022-02-25 RX ORDER — DIPHENHYDRAMINE HCL 50 MG
25 CAPSULE ORAL ONCE
Refills: 0 | Status: COMPLETED | OUTPATIENT
Start: 2022-02-25 | End: 2022-02-25

## 2022-02-25 RX ORDER — SODIUM CHLORIDE 9 MG/ML
1000 INJECTION, SOLUTION INTRAVENOUS
Refills: 0 | Status: DISCONTINUED | OUTPATIENT
Start: 2022-02-25 | End: 2022-02-25

## 2022-02-25 RX ORDER — OXYCODONE HYDROCHLORIDE 5 MG/1
30 TABLET ORAL EVERY 4 HOURS
Refills: 0 | Status: DISCONTINUED | OUTPATIENT
Start: 2022-02-25 | End: 2022-02-25

## 2022-02-25 RX ORDER — FOLIC ACID 0.8 MG
1 TABLET ORAL DAILY
Refills: 0 | Status: DISCONTINUED | OUTPATIENT
Start: 2022-02-25 | End: 2022-02-25

## 2022-02-25 RX ORDER — ONDANSETRON 8 MG/1
4 TABLET, FILM COATED ORAL EVERY 8 HOURS
Refills: 0 | Status: DISCONTINUED | OUTPATIENT
Start: 2022-02-25 | End: 2022-02-26

## 2022-02-25 RX ORDER — FOLIC ACID 0.8 MG
1 TABLET ORAL
Refills: 0 | Status: DISCONTINUED | OUTPATIENT
Start: 2022-02-25 | End: 2022-02-26

## 2022-02-25 RX ORDER — SENNA PLUS 8.6 MG/1
2 TABLET ORAL AT BEDTIME
Refills: 0 | Status: DISCONTINUED | OUTPATIENT
Start: 2022-02-25 | End: 2022-02-26

## 2022-02-25 RX ORDER — ALLOPURINOL 300 MG
300 TABLET ORAL DAILY
Refills: 0 | Status: DISCONTINUED | OUTPATIENT
Start: 2022-02-26 | End: 2022-02-26

## 2022-02-25 RX ORDER — SODIUM CHLORIDE 9 MG/ML
1000 INJECTION INTRAMUSCULAR; INTRAVENOUS; SUBCUTANEOUS
Refills: 0 | Status: DISCONTINUED | OUTPATIENT
Start: 2022-02-25 | End: 2022-02-26

## 2022-02-25 RX ORDER — ACETAMINOPHEN 500 MG
1000 TABLET ORAL EVERY 8 HOURS
Refills: 0 | Status: DISCONTINUED | OUTPATIENT
Start: 2022-02-25 | End: 2022-02-26

## 2022-02-25 RX ADMIN — Medication 1 MILLIGRAM(S): at 11:40

## 2022-02-25 RX ADMIN — PANTOPRAZOLE SODIUM 40 MILLIGRAM(S): 20 TABLET, DELAYED RELEASE ORAL at 06:58

## 2022-02-25 RX ADMIN — HYDROMORPHONE HYDROCHLORIDE 2 MILLIGRAM(S): 2 INJECTION INTRAMUSCULAR; INTRAVENOUS; SUBCUTANEOUS at 01:35

## 2022-02-25 RX ADMIN — SODIUM CHLORIDE 250 MILLILITER(S): 9 INJECTION, SOLUTION INTRAVENOUS at 09:53

## 2022-02-25 RX ADMIN — HYDROMORPHONE HYDROCHLORIDE 3 MILLIGRAM(S): 2 INJECTION INTRAMUSCULAR; INTRAVENOUS; SUBCUTANEOUS at 16:39

## 2022-02-25 RX ADMIN — Medication 650 MILLIGRAM(S): at 02:00

## 2022-02-25 RX ADMIN — HYDROMORPHONE HYDROCHLORIDE 3 MILLIGRAM(S): 2 INJECTION INTRAMUSCULAR; INTRAVENOUS; SUBCUTANEOUS at 10:23

## 2022-02-25 RX ADMIN — Medication 100 MILLIGRAM(S): at 11:40

## 2022-02-25 RX ADMIN — SENNA PLUS 2 TABLET(S): 8.6 TABLET ORAL at 22:08

## 2022-02-25 RX ADMIN — SODIUM CHLORIDE 150 MILLILITER(S): 9 INJECTION INTRAMUSCULAR; INTRAVENOUS; SUBCUTANEOUS at 13:44

## 2022-02-25 RX ADMIN — ONDANSETRON 4 MILLIGRAM(S): 8 TABLET, FILM COATED ORAL at 00:33

## 2022-02-25 RX ADMIN — HYDROMORPHONE HYDROCHLORIDE 3 MILLIGRAM(S): 2 INJECTION INTRAMUSCULAR; INTRAVENOUS; SUBCUTANEOUS at 09:26

## 2022-02-25 RX ADMIN — POLYETHYLENE GLYCOL 3350 17 GRAM(S): 17 POWDER, FOR SOLUTION ORAL at 11:40

## 2022-02-25 RX ADMIN — HYDROMORPHONE HYDROCHLORIDE 2 MILLIGRAM(S): 2 INJECTION INTRAMUSCULAR; INTRAVENOUS; SUBCUTANEOUS at 00:33

## 2022-02-25 RX ADMIN — HYDROMORPHONE HYDROCHLORIDE 3 MILLIGRAM(S): 2 INJECTION INTRAMUSCULAR; INTRAVENOUS; SUBCUTANEOUS at 17:37

## 2022-02-25 RX ADMIN — SODIUM CHLORIDE 150 MILLILITER(S): 9 INJECTION INTRAMUSCULAR; INTRAVENOUS; SUBCUTANEOUS at 20:24

## 2022-02-25 RX ADMIN — HYDROMORPHONE HYDROCHLORIDE 3 MILLIGRAM(S): 2 INJECTION INTRAMUSCULAR; INTRAVENOUS; SUBCUTANEOUS at 11:36

## 2022-02-25 RX ADMIN — SODIUM CHLORIDE 1000 MILLILITER(S): 9 INJECTION INTRAMUSCULAR; INTRAVENOUS; SUBCUTANEOUS at 00:33

## 2022-02-25 RX ADMIN — HYDROMORPHONE HYDROCHLORIDE 3 MILLIGRAM(S): 2 INJECTION INTRAMUSCULAR; INTRAVENOUS; SUBCUTANEOUS at 13:41

## 2022-02-25 RX ADMIN — HYDROMORPHONE HYDROCHLORIDE 3 MILLIGRAM(S): 2 INJECTION INTRAMUSCULAR; INTRAVENOUS; SUBCUTANEOUS at 07:44

## 2022-02-25 RX ADMIN — HYDROMORPHONE HYDROCHLORIDE 3 MILLIGRAM(S): 2 INJECTION INTRAMUSCULAR; INTRAVENOUS; SUBCUTANEOUS at 04:46

## 2022-02-25 RX ADMIN — Medication 25 MILLIGRAM(S): at 02:00

## 2022-02-25 RX ADMIN — SODIUM CHLORIDE 1000 MILLILITER(S): 9 INJECTION INTRAMUSCULAR; INTRAVENOUS; SUBCUTANEOUS at 01:35

## 2022-02-25 RX ADMIN — Medication 0.6 MILLIGRAM(S): at 11:40

## 2022-02-25 RX ADMIN — Medication 650 MILLIGRAM(S): at 03:06

## 2022-02-25 RX ADMIN — Medication 25 MILLIGRAM(S): at 06:57

## 2022-02-25 RX ADMIN — Medication 1 MILLIGRAM(S): at 22:07

## 2022-02-25 RX ADMIN — HYDROMORPHONE HYDROCHLORIDE 3 MILLIGRAM(S): 2 INJECTION INTRAMUSCULAR; INTRAVENOUS; SUBCUTANEOUS at 15:17

## 2022-02-25 RX ADMIN — HYDROMORPHONE HYDROCHLORIDE 2 MILLIGRAM(S): 2 INJECTION INTRAMUSCULAR; INTRAVENOUS; SUBCUTANEOUS at 20:43

## 2022-02-25 NOTE — H&P ADULT - PROBLEM SELECTOR PLAN 2
Pt admitted with Hb 4.4, baseline 5.5   Likely 2/2 to acute sickle crisis, good bone marrow response d/t retic  Check Peripheral smear   C/w 2 PRBC and recheck post transfusion CBC

## 2022-02-25 NOTE — H&P ADULT - ATTENDING COMMENTS
Vital Signs Last 24 Hrs  T(C): 36.8 (24 Feb 2022 21:39), Max: 36.8 (24 Feb 2022 21:39)  T(F): 98.2 (24 Feb 2022 21:39), Max: 98.2 (24 Feb 2022 21:39)  HR: 103 (24 Feb 2022 21:39) (103 - 103)  BP: 129/66 (24 Feb 2022 21:39) (129/66 - 129/66)  BP(mean): --  RR: 22 (24 Feb 2022 21:39) (22 - 22)  SpO2: 87% (24 Feb 2022 21:39) (87% - 87%) Patient is a 40 y/o male, from home, with significant medical history of Sickle Cell Disease, and Gout who presents with worsening back pain and bilateral lower chest pain; admitted for acute sickle cell crisis. Patient was seen at Dr. Jordan's clinic was noted to have low hb and  thus sent to ED. No acute chest pain syndrome.    Acute sickle cell crisis-   h/o multiple prior sickle crises; not on hydrea due to intolerance   Now presenting with backt pain, acute drop in Hb, fatigue   Retic 12.5%, Hb 4.4, WBC 11.14, TBili 7.5 check direct bilirurin, LDH   2 PRBC ordered> repeat post transfusion Hb and c/w aggressive IVF 1/2NS @250ml/hr x 8 hours   No Abx as does not meet features for acute chest syndrome: although he has lateral chest pain, no new infiltrates on CXR and hypoxia resolved with pain management, Folic acid, incentive spirometry  Pain consult  consulted Dr. Jordan heme/ onc, patient follows outpatient     symptomatic Anemia: as discussed above  has been on iron chelation before, not taking currently. check iron studies.    Gout: resume home meds

## 2022-02-25 NOTE — PATIENT PROFILE ADULT - FALL HARM RISK - HARM RISK INTERVENTIONS

## 2022-02-25 NOTE — DISCHARGE NOTE PROVIDER - NSDCCPCAREPLAN_GEN_ALL_CORE_FT
PRINCIPAL DISCHARGE DIAGNOSIS  Diagnosis: Anemia, sickle cell with crisis  Assessment and Plan of Treatment: You came in with chest and back pain and have a long standing history of sickle cell anemia. You had increase in your baby red blood cells which means that you were in a sickling crisis. For this you needed pain medicatins, fluids, oxygen. In the same setting you were found to have low hgb which was treated with blood transfusions. You have received 2 units  of blood, Oxycodone 30mg every 6 hours as needed which you take at home, dialudid 3mg as needed every 3hours which was tapered down to 2mg every 3hors as needed. Your pain is now controlled and your hgb is back to the baseline. You are being discharged to continue following with Dr Roberta Jordan as outpatient.  Please return to the ED if you have any fevers,  chills, chest pain  shortness of breath , nausea, vomiting , syncope, or presyncope or diarrhea.      SECONDARY DISCHARGE DIAGNOSES  Diagnosis: Gout  Assessment and Plan of Treatment: You have a history of gout and came in with a sickle cell crisis, we continued your preventative gout medications while you were here, in order to try to prevent a gout flare.     PRINCIPAL DISCHARGE DIAGNOSIS  Diagnosis: Anemia, sickle cell with crisis  Assessment and Plan of Treatment: You came in with chest and back pain and have a long standing history of sickle cell anemia. You had increase in your baby red blood cells which means that you were in a sickling crisis. For this you needed pain medicatins, fluids, oxygen. In the same setting you were found to have low hgb which was treated with blood transfusions. You have received 2 units  of blood, Oxycodone 30mg every 6 hours as needed which you take at home, dialudid 3mg as needed every 3hours which was tapered down to 2mg every 3hours as needed and eventually 4mg by mouth.  Your pain is now controlled and your hgb is back to the baseline. You are being discharged to continue following with Dr Roberta Jordan as outpatient.  Please return to the ED if you have any fevers,  chills, chest pain  shortness of breath , nausea, vomiting , syncope, or presyncope or diarrhea.      SECONDARY DISCHARGE DIAGNOSES  Diagnosis: Gout  Assessment and Plan of Treatment: You have a history of gout and came in with a sickle cell crisis, we continued your preventative gout medications while you were here, in order to try to prevent a gout flare.     PRINCIPAL DISCHARGE DIAGNOSIS  Diagnosis: Anemia, sickle cell with crisis  Assessment and Plan of Treatment: You came in with chest and back pain and have a long standing history of sickle cell anemia. You had increase in your baby red blood cells which means that you were in a sickling crisis. For this you needed pain medicatins, fluids, oxygen. In the same setting you were found to have low hgb which was treated with blood transfusions. You have received 2 units  of blood, Oxycodone 30mg every 6 hours as needed which you take at home, dialudid 3mg as needed every 3hours which was tapered down to 2mg every 3hours as needed and eventually 4mg by mouth.  Your pain is now controlled and your hgb is back to the baseline. You are being discharged to continue following with Dr Roberta Jordan as outpatient.  You have not tolerated Hydroxyurea in the past. You was tried on alternate medication which you could not tolerate but will try to attempt again in a week.   Please return to the ED if you have any fevers,  chills, chest pain  shortness of breath , nausea, vomiting , syncope, or presyncope or diarrhea.      SECONDARY DISCHARGE DIAGNOSES  Diagnosis: Symptomatic anemia  Assessment and Plan of Treatment: Your baseline hemoglobin is around 6 gms as per your Hematologist Dr. Jordan; You was transfused 2 units due to active sickling and hemoglobin of 4.5 gm on admission. Your blood count remains stable around 6.5 gm after transfusion. Follow up wiuth Dr. Jordan and get blood work repeated in 2 weeks.    Diagnosis: Gout  Assessment and Plan of Treatment: You have a history of gout and came in with a sickle cell crisis, we continued your preventative gout medications while you were here, in order to try to prevent a gout flare. Ideally should be on 300 mg daily for prophylaxis with normal kidney function. Please check with your PCP.     PRINCIPAL DISCHARGE DIAGNOSIS  Diagnosis: Anemia, sickle cell with crisis  Assessment and Plan of Treatment: You came in with chest and back pain and have a long standing history of sickle cell anemia. You had increase in your baby red blood cells which means that you were in a sickling crisis. For this you needed pain medicatins, fluids, oxygen. In the same setting you were found to have low hgb which was treated with blood transfusions. You have received 2 units  of blood, Oxycodone 30mg every 6 hours as needed which you take at home, dialudid 3mg as needed every 3hours which was tapered down to 2mg every 3hours as needed and eventually 4mg by mouth.  Your pain is now controlled and your hgb is back to the baseline. You are being discharged to continue following with Dr Roberta Jordan as outpatient.  You have not tolerated Hydroxyurea in the past. You was tried on alternate medication which you could not tolerate but will try to attempt again in a week.   Please return to the ED if you have any fevers,  chills, chest pain  shortness of breath , nausea, vomiting , syncope, or presyncope or diarrhea.      SECONDARY DISCHARGE DIAGNOSES  Diagnosis: Symptomatic anemia  Assessment and Plan of Treatment: Your baseline hemoglobin is around 6 gms as per your Hematologist Dr. Jordan; You was transfused 2 units due to active sickling and hemoglobin of 4.5 gm on admission. Your blood count remains stable around 6.5 gm after transfusion. Follow up wiuth Dr. Jordan and get blood work repeated in 2 weeks.    Diagnosis: Elevated bilirubin  Assessment and Plan of Treatment: You have elevated bilirubim\n with both total and direct bilirubin elevated likely due to rapid blood turn\nover and breakdown of blood cells and release of bilirubin pigments into blood stream; PLEASE REPEAT BILIRUBIN BOTH DIRECT AND INDIRECT IN A WEEK FOR FOLLOW UP ONCE ACUTE CRISIS IS RESOLVED; IF REMAINS ELEVATED MAY FOLLOW UP WITH PCP AND GET AN ULTRASOUND OF LIVER AND GALLBLADDER.    Diagnosis: Gout  Assessment and Plan of Treatment: You have a history of gout and came in with a sickle cell crisis, we continued your preventative gout medications while you were here, in order to try to prevent a gout flare. Ideally should be on 300 mg daily for prophylaxis with normal kidney function. Please check with your PCP.     PRINCIPAL DISCHARGE DIAGNOSIS  Diagnosis: Anemia, sickle cell with crisis  Assessment and Plan of Treatment: You came in with chest and back pain and have a long standing history of sickle cell anemia. You had increase in your baby red blood cells which means that you were in a sickling crisis. For this you needed pain medicatins, fluids, oxygen. In the same setting you were found to have low hgb which was treated with blood transfusions. You have received 2 units  of blood, Oxycodone 30mg every 6 hours as needed which you take at home, dialudid 3mg as needed every 3hours which was tapered down to 2mg every 3hours as needed and eventually 4mg by mouth.  Your pain is now controlled and your hgb is back to the baseline. You are being discharged to continue following with Dr Roberta Jordan as outpatient.  You have not tolerated Hydroxyurea in the past. You was tried on alternate medication which you could not tolerate but will try to attempt again in a week.   Please return to the ED if you have any fevers,  chills, chest pain  shortness of breath , nausea, vomiting , syncope, or presyncope or diarrhea.      SECONDARY DISCHARGE DIAGNOSES  Diagnosis: Symptomatic anemia  Assessment and Plan of Treatment: Your baseline hemoglobin is around 6 gms as per your Hematologist Dr. Jordan; You was transfused 2 units due to active sickling and hemoglobin of 4.5 gm on admission. Your blood count remains stable around 6.5 gm after transfusion. Follow up wiuth Dr. Jordan and get blood work repeated in 2 weeks.    Diagnosis: Elevated bilirubin  Assessment and Plan of Treatment: You have elevated bilirubim\n with both total and direct bilirubin elevated likely due to rapid blood turn\nover and breakdown of blood cells and release of bilirubin pigments into blood stream; PLEASE REPEAT BILIRUBIN BOTH DIRECT AND INDIRECT IN A WEEK FOR FOLLOW UP ONCE ACUTE CRISIS IS RESOLVED; IF REMAINS ELEVATED MAY FOLLOW UP WITH PCP AND GET AN ULTRASOUND OF LIVER AND GALLBLADDER.    Diagnosis: Gout  Assessment and Plan of Treatment: You have a history of gout and came in with a sickle cell crisis, we continued your preventative gout medications while you were here, in order to try to prevent a gout flare. Ideally you should be on 300 mg daily for prophylaxis with normal kidney function. Please check with your PCP.

## 2022-02-25 NOTE — ED ADULT NURSE REASSESSMENT NOTE - NS ED NURSE REASSESS COMMENT FT1
Pt only has right chest port as IV access. As per Dr. Lutz, ok to start continuous fluid after blood transfusion is completed.

## 2022-02-25 NOTE — CONSULT NOTE ADULT - SUBJECTIVE AND OBJECTIVE BOX
Patient is a 41y old  Male who presents with a chief complaint of Acute Sickle Crisis (25 Feb 2022 08:08)      HPI:  Patient is a 42 y/o male, from home, with significant medical history of Sickle Cell Disease, and Gout who presents with worsening back pain and bilateral lower chest pain. He is a longstanding SCD patient with 2 crises and 4 units PRBC received since the last 6 months. He reports that he used to need monthly PRBC infusions and is relatively feeling better recently; his baseline hemoglobin is around 5.5g/dl and he follows with Dr. [facility]. Patient endorses that he woke up on the day of admission with a flare of back pain, worsening fatigue, headache, and dizziness. He visited Dr. Jordan and found to have a low hemoglobin on outpatient labs for which he was directed to the ED. Patient has some baseline jaundice, and bilateral swelling of his feet which have increased; he otherwise denies any productive cough, chest pain on exertion, acutely tender or swollen extremities, priapism, or difficulty urinating. He also denies any headaches, visual disturbances, N/V/D, falls, palpitations, fevers, or skin rash. He hasn't had a gout attack in some time. Of note, patient is intolerant to Hydrea (causes priapism), PCN, Rocephin, Zosyn; he was trailed on Oxbryta but it was stopped 2/2 to GI side effects. He normally uses Oxycodone 30mg IR Q6h PRN at home for pain, and has been using it more frequently for the past few days.  (25 Feb 2022 02:25)       ROS:  Negative except for:    PAST MEDICAL & SURGICAL HISTORY:  Sickle cell anemia    Gout    Anemia requiring transfusions    Marijuana abuse    Pneumonia    History of cholecystectomy        SOCIAL HISTORY:    FAMILY HISTORY:  Family history of sickle cell trait (Father, Mother)        MEDICATIONS  (STANDING):  allopurinol 100 milliGRAM(s) Oral daily  colchicine 0.6 milliGRAM(s) Oral daily  folic acid 1 milliGRAM(s) Oral daily  pantoprazole    Tablet 40 milliGRAM(s) Oral before breakfast  sodium chloride 0.45%. 1000 milliLiter(s) (250 mL/Hr) IV Continuous <Continuous>    MEDICATIONS  (PRN):  aluminum hydroxide/magnesium hydroxide/simethicone Suspension 30 milliLiter(s) Oral every 4 hours PRN Dyspepsia  HYDROmorphone  Injectable 3 milliGRAM(s) IV Push every 3 hours PRN Severe Pain (7 - 10)  melatonin 3 milliGRAM(s) Oral at bedtime PRN Insomnia  ondansetron Injectable 4 milliGRAM(s) IV Push every 8 hours PRN Nausea and/or Vomiting  oxyCODONE    IR 30 milliGRAM(s) Oral every 4 hours PRN Moderate Pain (4 - 6)      Allergies    ceftriaxone (Anaphylaxis)  hydroxyurea (Other)  penicillin (Pruritus)  piperacillin-tazobactam (Urticaria)    Intolerances        Vital Signs Last 24 Hrs  T(C): 36.3 (25 Feb 2022 07:45), Max: 36.9 (25 Feb 2022 04:44)  T(F): 97.3 (25 Feb 2022 07:45), Max: 98.5 (25 Feb 2022 04:44)  HR: 77 (25 Feb 2022 07:45) (75 - 103)  BP: 123/68 (25 Feb 2022 07:45) (120/64 - 134/78)  BP(mean): --  RR: 18 (25 Feb 2022 07:45) (18 - 22)  SpO2: 95% (25 Feb 2022 07:45) (87% - 97%)    PHYSICAL EXAM  General: adult in NAD  HEENT: clear oropharynx, anicteric sclera, pink conjunctiva  Neck: supple  CV: normal S1/S2 with no murmur rubs or gallops  Lungs: positive air movement b/l ant lungs,clear to auscultation, no wheezes, no rales  Abdomen: soft non-tender non-distended, no hepatosplenomegaly  Ext: no clubbing cyanosis or edema  Skin: no rashes and no petechiae  Neuro: alert and oriented X 4, no focal deficits      LABS:                          4.4    11.14 )-----------( 231      ( 24 Feb 2022 23:07 )             12.3         Mean Cell Volume : 91.8 fl  Mean Cell Hemoglobin : 32.8 pg  Mean Cell Hemoglobin Concentration : 35.8 gm/dL  Auto Neutrophil # : 6.91 K/uL  Auto Lymphocyte # : 2.12 K/uL  Auto Monocyte # : 0.67 K/uL  Auto Eosinophil # : 0.56 K/uL  Auto Basophil # : 0.00 K/uL  Auto Neutrophil % : 61.0 %  Auto Lymphocyte % : 19.0 %  Auto Monocyte % : 6.0 %  Auto Eosinophil % : 5.0 %  Auto Basophil % : 0.0 %      Serial CBC's  02-24 @ 23:07  Hct-12.3 / Hgb-4.4 / Plat-231 / RBC-1.34 / WBC-11.14      02-24    138  |  109<H>  |  27<H>  ----------------------------<  95  4.7   |  24  |  1.27    Ca    8.8      24 Feb 2022 23:07    TPro  7.5  /  Alb  3.3<L>  /  TBili  7.5<H>  /  DBili  x   /  AST  124<H>  /  ALT  54  /  AlkPhos  137<H>  02-24      PT/INR - ( 24 Feb 2022 23:07 )   PT: 13.3 sec;   INR: 1.12 ratio         PTT - ( 24 Feb 2022 23:07 )  PTT:34.9 sec    Reticulocyte Percent: 12.5 % (02-24 @ 23:07)              BLOOD SMEAR INTERPRETATION:       RADIOLOGY & ADDITIONAL STUDIES:    
  Source of information: TOLU VARGAS, Chart review  Patient language: English  : n/a    HPI:  Patient is a 42 y/o male, from home, with significant medical history of Sickle Cell Disease, and Gout who presents with worsening back pain and bilateral lower chest pain. He is a longstanding SCD patient with 2 crises and 4 units PRBC received since the last 6 months. He reports that he used to need monthly PRBC infusions and is relatively feeling better recently; his baseline hemoglobin is around 5.5g/dl and he follows with Dr. [facility]. Patient endorses that he woke up on the day of admission with a flare of back pain, worsening fatigue, headache, and dizziness. He visited Dr. Jordan and found to have a low hemoglobin on outpatient labs for which he was directed to the ED. Patient has some baseline jaundice, and bilateral swelling of his feet which have increased; he otherwise denies any productive cough, chest pain on exertion, acutely tender or swollen extremities, priapism, or difficulty urinating. He also denies any headaches, visual disturbances, N/V/D, falls, palpitations, fevers, or skin rash. He hasn't had a gout attack in some time. Of note, patient is intolerant to Hydrea (causes priapism), PCN, Rocephin, Zosyn; he was trailed on Oxbryta but it was stopped 2/2 to GI side effects. He normally uses Oxycodone 30mg IR Q6h PRN at home for pain, and has been using it more frequently for the past few days.  (25 Feb 2022 02:25)      Pt diagnosed with Acute Sickle Crisis.  + anemia h/h 4.4/12.3. Retic cont 12.5%.  S/p 2 units PRBC 2/25. Pain consulted for sickle cell pain. Pt seen and examined at bedside. Pt laying in bed, lethargic. Reports chest and back 8/10 pain score. Pt describes pain as aching, intermittently sharp, non- radiating, alleviated by pain medication,  exacerbated by movement. Pt tolerating PO diet. Denies lethargy, nausea, vomiting, constipation, itchiness. Reports last BM 2/24. Patient stated goal for pain control: to be able to take deep breaths, get out of bed to chair and ambulate with tolerable pain control. Pt reports taking oxycodone 30mg PO approx  2-3x/day for pain at home. Per [facility] pt on oxycodone 30mg PO q4h last prescription 12/20/21. Pt also reports smoking marijuana daily had has a medical marijuana card which has helped him for his pain.    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 1000 milliLiter(s) (250 mL/Hr) IV Continuous <Continuous>    MEDICATIONS  (PRN):  acetaminophen     Tablet .. 1000 milliGRAM(s) Oral every 8 hours PRN Moderate Pain (4 - 6)  aluminum hydroxide/magnesium hydroxide/simethicone Suspension 30 milliLiter(s) Oral every 4 hours PRN Dyspepsia  HYDROmorphone  Injectable 3 milliGRAM(s) IV Push every 3 hours PRN Severe Pain (7 - 10)  melatonin 3 milliGRAM(s) Oral at bedtime PRN Insomnia  ondansetron Injectable 4 milliGRAM(s) IV Push every 8 hours PRN Nausea and/or Vomiting      Vital Signs Last 24 Hrs  T(C): 36.5 (25 Feb 2022 09:45), Max: 36.9 (25 Feb 2022 04:44)  T(F): 97.7 (25 Feb 2022 09:45), Max: 98.5 (25 Feb 2022 04:44)  HR: 76 (25 Feb 2022 09:45) (75 - 103)  BP: 132/78 (25 Feb 2022 09:45) (120/64 - 134/78)  BP(mean): --  RR: 16 (25 Feb 2022 09:45) (16 - 22)  SpO2: 97% (25 Feb 2022 09:45) (87% - 97%)    LABS: Reviewed.                          4.4    11.14 )-----------( 231      ( 24 Feb 2022 23:07 )             12.3     02-24    138  |  109<H>  |  27<H>  ----------------------------<  95  4.7   |  24  |  1.27    Ca    8.8      24 Feb 2022 23:07    TPro  7.5  /  Alb  3.3<L>  /  TBili  7.5<H>  /  DBili  x   /  AST  124<H>  /  ALT  54  /  AlkPhos  137<H>  02-24    PT/INR - ( 24 Feb 2022 23:07 )   PT: 13.3 sec;   INR: 1.12 ratio         PTT - ( 24 Feb 2022 23:07 )  PTT:34.9 sec  LIVER FUNCTIONS - ( 24 Feb 2022 23:07 )  Alb: 3.3 g/dL / Pro: 7.5 g/dL / ALK PHOS: 137 U/L / ALT: 54 U/L DA / AST: 124 U/L / GGT: x             CAPILLARY BLOOD GLUCOSE        COVID-19 PCR: NotDetec (25 Feb 2022 00:43)  SARS-CoV-2: NotDetec (28 Oct 2021 05:25)  COVID-19 PCR: NotDetec (28 Oct 2021 03:35)  COVID-19 PCR: NotDetec (07 Oct 2021 18:02)      Radiology: Reviewed.   < from: Xray Chest 1 View- PORTABLE-Routine (Xray Chest 1 View- PORTABLE-Routine .) (10.28.21 @ 10:41) >    EXAM:  XR CHEST PORTABLE ROUTINE 1V                            PROCEDURE DATE:  10/28/2021          INTERPRETATION:  CLINICAL STATEMENT: Follow-up chest pain.    TECHNIQUE: AP view of the chest.    COMPARISON: 10/7/2021    FINDINGS/  IMPRESSION:  Right Mediport again noted. Mild increased interstitial lung markings. Atelectasis lung bases.    Heart size cannot be accurately assessed in this projection, but appear enlarged.      --- End of Report ---            ESTEPHANIA MOISE MD; Attending Radiologist  This document has been electronically signed. Oct 28 2021  2:21PM    < end of copied text >      ORT Score -   Family Hx of substance abuse	Female	      Male  Alcohol 	                                           1                     3  Illegal drugs	                                   2                     3  Rx drugs                                           4 	                  4  Personal Hx of substance abuse		  Alcohol 	                                          3	                  3  Illegal drugs                                     4	                  4  Rx drugs                                            5 	                  5  Age between 16- 45 years	           1                     1  hx preadolescent sexual abuse	   3 	                  0  Psychological disease		  ADD, OCD, bipolar, schizophrenia   2	          2  Depression                                           1 	          1  Total: 5    a score of 3 or lower indicates low risk for opioid abuse		  a score of 4-7 indicates moderate risk for opioid abuse		  a score of 8 or higher indicates high risk for opioid abuse  	    REVIEW OF SYSTEMS:  CONSTITUTIONAL: No fever or fatigue  RESPIRATORY: No cough, wheezing, chills or hemoptysis; No shortness of breath  CARDIOVASCULAR: + chest wall pain, No palpitations, dizziness, or leg swelling  GASTROINTESTINAL: No loss of appetite, decreased PO intake. No abdominal or epigastric pain. No nausea, vomiting; No diarrhea or constipation.   GENITOURINARY: No dysuria, frequency, hematuria, retention or incontinence  MUSCULOSKELETAL: + b/l foot edema; + back pain, no upper or lower motor strength weakness, no saddle anesthesia, bowel/bladder incontinence, no falls   NEURO: No headaches, No numbness/tingling b/l LE, No weakness  ENDOCRINE: No polyuria, polydipsia, heat or cold intolerance; No hair loss  PSYCHIATRIC: No depression, anxiety or difficulty sleeping    PHYSICAL EXAM:  GENERAL:  Alert & Oriented X4, cooperative, NAD, Good concentration. Speech is clear.   RESPIRATORY: Respirations even and unlabored. Clear to auscultation bilaterally; No rales, rhonchi, wheezing, or rubs  CARDIOVASCULAR: + right chest wall mediport; Normal S1/S2, regular rate and rhythm; No murmurs, rubs, or gallops. No JVD.   GASTROINTESTINAL:  Soft, Nontender, Nondistended; Bowel sounds present  PERIPHERAL VASCULAR:  Extremities warm with mild b/l foot edema. 2+ Peripheral Pulses, No cyanosis, No calf tenderness  MUSCULOSKELETAL: Motor Strength 5/5 B/L upper and lower extremities; moves all extremities equally against gravity; ROM intact; negative SLR; + generalized back tenderness on palpation   SKIN: Warm, dry, intact. No rashes, lesions, scars or wounds.     Risk factors associated with adverse outcomes related to opioid treatment  [ ]  Concurrent benzodiazepine use  [ ]  History/ Active substance use or alcohol use disorder  [ ] Psychiatric co-morbidity  [ ] Sleep apnea  [ ] COPD  [ ] BMI> 35  [ ] Liver dysfunction  [ ] Renal dysfunction  [ ] CHF  [X ] Smoker - former cigarette smoker, current marijuana smoker   [ ]  Age > 60 years    [X ]  [place]  Reviewed and Copied to Chart. See below.    Plan of care and goal oriented pain management treatment options were discussed with patient and /or primary care giver; all questions and concerns were addressed and care was aligned with patient's wishes.    Educated patient on goal oriented pain management treatment options

## 2022-02-25 NOTE — DISCHARGE NOTE PROVIDER - NSDCHHASSISTILLNESS_GEN_ALL_CORE
Too much pain Tn ambulation, may need observation and supervision of PRN medication use - self titrating based on pain scale

## 2022-02-25 NOTE — DISCHARGE NOTE PROVIDER - NSDCMRMEDTOKEN_GEN_ALL_CORE_FT
allopurinol 100 mg oral tablet: orally once a day  colchicine 0.6 mg oral tablet: 1 tab(s) orally once a day  folic acid 1 mg oral tablet: 2 tab(s) orally once a day  omeprazole 40 mg oral delayed release capsule: 1 cap(s) orally once a day  oxyCODONE 30 mg oral tablet: 1 tab(s) orally every 4 hours, As needed, Severe Pain (7 - 10)   allopurinol 100 mg oral tablet: orally once a day  colchicine 0.6 mg oral tablet: 1 tab(s) orally once a day  folic acid 1 mg oral tablet: 2 tab(s) orally once a day  oxyCODONE 30 mg oral tablet: 1 tab(s) orally every 4 hours, As needed, Severe Pain (7 - 10)  polyethylene glycol 3350 oral powder for reconstitution: 17 gram(s) orally once a day  senna oral tablet: 2 tab(s) orally once a day (at bedtime)   allopurinol 100 mg oral tablet: orally once a day  colchicine 0.6 mg oral tablet: 1 tab(s) orally once a day  folic acid 1 mg oral tablet: 1 tab(s) orally 2 times a day  oxyCODONE 30 mg oral tablet: 1 tab(s) orally every 4 hours, As needed, Severe Pain (7 - 10)  polyethylene glycol 3350 oral powder for reconstitution: 17 gram(s) orally once a day, As Needed if no bowel movements only  senna oral tablet: 2 tab(s) orally once a day (at bedtime)

## 2022-02-25 NOTE — PROGRESS NOTE ADULT - ATTENDING COMMENTS
Patient seen and examined with Giovannitaff    40 y/o male, from home, with significant medical history of Sickle Cell Disease, and Hx of Gout who presents with worsening back pain and bilateral lower chest pain; admitted for acute sickle cell crisis. Patient was seen at Dr. Jordan's clinic was noted to have low hb and  thus sent to ED. No acute chest pain syndrome.    Vital Signs Last 24 Hrs  T(C): 36.5 (25 Feb 2022 09:45), Max: 36.9 (25 Feb 2022 04:44)  T(F): 97.7 (25 Feb 2022 09:45), Max: 98.5 (25 Feb 2022 04:44)  HR: 76 (25 Feb 2022 09:45) (75 - 103)  BP: 132/78 (25 Feb 2022 09:45) (120/64 - 134/78)  RR: 16 (25 Feb 2022 09:45) (16 - 22)  SpO2: 97% (25 Feb 2022 09:45) (87% - 97%)    P/E:  Psych: AAO x3  Neuro: No gross focal deficits; Power and sensation intact  CVS: S1S2 present, regular, no edema  Resp: BLAE+, No wheeze or Rhonchi  GI: Soft, BS+, Non tender, non distended  Extr: No  calf tenderness B/L Lower extremities  Skin: Warm and moist without any rashes    Labs:                        6.4    9.26  )-----------( 222      ( 25 Feb 2022 11:58 )             17.9   02-25    140  |  113<H>  |  24<H>  ----------------------------<  91  4.8   |  23  |  0.86    Ca    8.6      25 Feb 2022 11:58  Phos  3.7     02-25  Mg     2.1     02-25  TPro  6.9  /  Alb  3.2<L>  /  TBili  6.6<H>  /  DBili  3.0<H>  /  AST  113<H>  /  ALT  51  /  AlkPhos  127<H>  02-25    D/D:  Acute sickle cell crisis with Sy,ptomatic Anemia  Hyperbilirubinemia likely due to active sickling   Hx Gout    Plan:  Continue IV Fluid; can reduced to 150 cc/hr as cvlinically better   not on hydrea due to intolerance   H/H stable post 2 units PRBC; Baseline Hgb around 6 as per Dr Jordan Hematology; Consult appreciated  Retic 12.5%, Hb 4.4, WBC 11.14, TBili 7.5 check direct bilirubin, LDH; follow up repeat labs    Pain mgmt eval for adjusting meds; will reduce Dilaudid to 2 mf q 4 hrs PRN as pain is better today  Incentive spirometry  Increase Folate to BID  Continue Allopurinol and Colchicine; will consider increase Allopurinol to 300 mg daily if okay with Dr. Jordan  as right dose unless renal insufficiency    Discussed with patient findings and plan of care  Discussed with RN  Discussed with MS3/ PGY1 and PGY3 Patient seen and examined with Giovannitaff    42 y/o male, from home, with significant medical history of Sickle Cell Disease, and Hx of Gout who presents with worsening back pain and bilateral lower chest pain; admitted for acute sickle cell crisis. Patient was seen at Dr. Jordan's clinic was noted to have low hb and  thus sent to ED. No acute chest pain syndrome.    Vital Signs Last 24 Hrs  T(C): 36.5 (25 Feb 2022 09:45), Max: 36.9 (25 Feb 2022 04:44)  T(F): 97.7 (25 Feb 2022 09:45), Max: 98.5 (25 Feb 2022 04:44)  HR: 76 (25 Feb 2022 09:45) (75 - 103)  BP: 132/78 (25 Feb 2022 09:45) (120/64 - 134/78)  RR: 16 (25 Feb 2022 09:45) (16 - 22)  SpO2: 97% (25 Feb 2022 09:45) (87% - 97%)    P/E:  Psych: AAO x3  Neuro: No gross focal deficits; Power and sensation intact  CVS: S1S2 present, regular, no edema  Resp: BLAE+, No wheeze or Rhonchi  GI: Soft, BS+, Non tender, non distended  Extr: No  calf tenderness B/L Lower extremities  Skin: Warm and moist without any rashes    Labs:                        6.4    9.26  )-----------( 222      ( 25 Feb 2022 11:58 )             17.9   02-25    140  |  113<H>  |  24<H>  ----------------------------<  91  4.8   |  23  |  0.86    Ca    8.6      25 Feb 2022 11:58  Phos  3.7     02-25  Mg     2.1     02-25  TPro  6.9  /  Alb  3.2<L>  /  TBili  6.6<H>  /  DBili  3.0<H>  /  AST  113<H>  /  ALT  51  /  AlkPhos  127<H>  02-25    D/D:  Acute sickle cell crisis with Sy,ptomatic Anemia  Hyperbilirubinemia likely due to active sickling   Hx Gout    Plan:  Continue IV Fluid; can reduced to 150 cc/hr as clinically better  not on hydrea due to intolerance   H/H stable post 2 units PRBC; Baseline Hgb around 6 as per Dr Jordan Hematology; Consult appreciated  Pain mgmt eval for adjusting meds; will reduce Dilaudid to 2 mg q 4 hrs PRN as pain is better today  Incentive spirometry  Increase Folate to BID  Continue Allopurinol and Colchicine; will consider increase Allopurinol to 300 mg daily if okay with Dr. Jordan  as right dose unless renal insufficiency    Discussed with patient findings and plan of care  Discussed with RN  Discussed with MS3/ PGY1 and PGY3 Patient seen and examined with Giovannitaff    42 y/o male, from home, with significant medical history of Sickle Cell Disease, and Hx of Gout who presents with worsening back pain and bilateral lower chest pain; admitted for acute sickle cell crisis. Patient was seen at Dr. Jordan's clinic was noted to have low hb and  thus sent to ED. No acute chest pain syndrome.    Vital Signs Last 24 Hrs  T(C): 36.5 (25 Feb 2022 09:45), Max: 36.9 (25 Feb 2022 04:44)  T(F): 97.7 (25 Feb 2022 09:45), Max: 98.5 (25 Feb 2022 04:44)  HR: 76 (25 Feb 2022 09:45) (75 - 103)  BP: 132/78 (25 Feb 2022 09:45) (120/64 - 134/78)  RR: 16 (25 Feb 2022 09:45) (16 - 22)  SpO2: 97% (25 Feb 2022 09:45) (87% - 97%)    P/E:  Psych: AAO x3  Neuro: No gross focal deficits; Power and sensation intact  CVS: S1S2 present, regular, no edema  Resp: BLAE+, No wheeze or Rhonchi  GI: Soft, BS+, Non tender, non distended  Extr: No  calf tenderness B/L Lower extremities  Skin: Warm and moist without any rashes    Labs:                        6.4    9.26  )-----------( 222      ( 25 Feb 2022 11:58 )             17.9   02-25    140  |  113<H>  |  24<H>  ----------------------------<  91  4.8   |  23  |  0.86    Ca    8.6      25 Feb 2022 11:58  Phos  3.7     02-25  Mg     2.1     02-25  TPro  6.9  /  Alb  3.2<L>  /  TBili  6.6<H>  /  DBili  3.0<H>  /  AST  113<H>  /  ALT  51  /  AlkPhos  127<H>  02-25    D/D:  Acute sickle cell crisis with Symptomatic Anemia  Hyperbilirubinemia likely due to active sickling   Hx Gout    Plan:  Continue IV Fluid; can reduced to 150 cc/hr as clinically better  not on hydroxyurea due to intolerance   H/H stable post 2 units PRBC; Baseline Hgb around 6 as per Dr Jordan Hematology; Consult appreciated  Pain mgmt eval for adjusting meds; will reduce Dilaudid to 2 mg q 4 hrs PRN as pain is better today  Incentive spirometry  Increase Folate to BID  Continue Allopurinol and Colchicine; will consider increase Allopurinol to 300 mg daily if okay with Dr. Jordan  as right dose unless renal insufficiency    Discussed with patient findings and plan of care  Discussed with RN  Discussed with MS3/ PGY1 and PGY3

## 2022-02-25 NOTE — H&P ADULT - HISTORY OF PRESENT ILLNESS
Patient is a 42 y/o male, from home, with significant medical history of Sickle Cell Disease, and Gout who presents with worsening back pain and bilateral lower chest pain. He is a longstanding SCD patient with 2 crises and 4 units PRBC received since the last 6 months. He reports that he used to need monthly PRBC infusions and is relatively feeling better recently; his baseline hemoglobin is around 5.5g/dl and he follows with Dr. [facility]. Patient endorses that he woke up on the day of admission with a flare of back pain, worsening fatigue, headache, and dizziness. He visited Dr. Jordan and found to have a low hemoglobin on outpatient labs for which he was directed to the ED. Patient has some baseline jaundice, and bilateral swelling of his feet which have increased; he otherwise denies any productive cough, chest pain on exertion, acutely tender or swollen extremities, priapism, or difficulty urinating. He also denies any headaches, visual disturbances, N/V/D, falls, palpitations, fevers, or skin rash. He hasn't had a gout attack in some time. Of note, patient is intolerant to Hydrea (causes priapism), PCN, Rocephin, Zosyn; he was trailed on Oxbryta but it was stopped 2/2 to GI side effects. He normally uses Oxycodone 30mg IR Q6h PRN at home for pain, and has been using it more frequently for the past few days.

## 2022-02-25 NOTE — H&P ADULT - NSHPPHYSICALEXAM_GEN_ALL_CORE
GENERAL APPEARANCE: Well developed AAM, AA0x3, in moderate distress due to pain  HEENT: Normocephalic, PERRL, EOMI External auditory canals clear, hearing grossly intact, no nasal discharge   THROAT: Oral cavity and pharynx normal. No inflammation, swelling, exudate, or lesions.  CARDIAC: Normal S1 and S2. No S3, S4 or murmurs. Rhythm is regular. There is 1+ pitting peripheral edema, cyanosis or pallor.  LUNGS: Clear to auscultation and percussion without rales, rhonchi, wheezing or diminished breath sounds.  ABDOMEN: Positive bowel sounds. Soft, nondistended, nontender. No guarding or rebound. No masses.  EXTREMITIES: No significant deformity or joint abnormality. No edema. Peripheral pulses intact. No varicosities.  NEUROLOGICAL: CN II-XII intact. Strength and sensation symmetric and intact throughout. Reflexes 2+ throughout.   SKIN: No skin breakdown, lesions or rashes noted

## 2022-02-25 NOTE — H&P ADULT - PROBLEM SELECTOR PLAN 4
hold chemical DVt ppx due to anemia   IMPROVE VTE Individual Risk Assessment  RISK                                                                Points  [  ] Previous VTE                                                  3  [ x ] Thrombophilia                                               2  [  ] Lower limb paralysis                                      2        (unable to hold up >15 seconds)    [  ] Current Cancer                                              2         (within 6 months)  [ x ] Immobilization > 24 hrs                                1  [  ] ICU/CCU stay > 24 hours                              1  [  ] Age > 60                                                      1  IMPROVE VTE Score ___3______

## 2022-02-25 NOTE — DISCHARGE NOTE PROVIDER - HOSPITAL COURSE
Patient is a 42 y/o male, from home, with significant medical history of Sickle Cell Disease, and Gout who presents with worsening back pain and bilateral lower chest pain. He is a longstanding SCD patient with 2 crises and 4 units PRBC received since the last 6 months. He reports that he used to need monthly PRBC infusions and is relatively feeling better recently; his baseline hemoglobin is around 5.5g/dl and he follows with Dr. Jordan Heme-Onc. Patient endorses that he woke up on the day of admission with a flare of back pain, worsening fatigue, headache, and dizziness. He visited Dr. Jordan and found to have a low hemoglobin on outpatient labs for which he was directed to the ED. Patient has some baseline jaundice, and bilateral swelling of his feet which have increased; he otherwise denies any productive cough, chest pain on exertion, acutely tender or swollen extremities, priapism, or difficulty urinating. He also denies any headaches, visual disturbances, N/V/D, falls, palpitations, fevers, or skin rash. He hasn't had a gout attack in some time. Of note, patient is intolerant to Hydrea (causes priapism), PCN, Rocephin, Zosyn; he was trailed on Oxbryta but it was stopped 2/2 to GI side effects. He normally uses Oxycodone 30mg IR Q6h PRN at home for pain, and has been using it more frequently for the past few days.     Patient is a 42 y/o male, from home, with significant medical history of Sickle Cell Disease, and Gout who presents with worsening back pain and bilateral lower chest pain; admitted for acute sickle cell crisis.   Of note, patient is intolerant to Hydrea (causes priapism), PCN, Rocephin, Zosyn; he was trailed on Oxbryta but it was stopped 2/2 to GI side effects. He normally uses Oxycodone 30mg IR Q6h PRN at home for pain, and has been using it more frequently for the past few days.     Patient was found was found to have a Hb of 4.4 and a bilirubin of 7.5. Patient has a baseline Hb ~5-6 as per Dr Jordan.  Patient received 2 unites of packed RBCs along with 25mg benadryl and was on 2L O2. For the pain Patient received DILAUDID 3 mg IV push every three hours PRN and oxycodone 30mg po every 4 hours po PRN. Dr. Jordan recommends discharge today 2/25/22. Patient is a 42 y/o male, from home, with significant medical history of Sickle Cell Disease, and Gout who presents with worsening back pain and bilateral lower chest pain. He is a longstanding SCD patient with 2 crises and 4 units PRBC received since the last 6 months. He reports that he used to need monthly PRBC infusions and is relatively feeling better recently; his baseline hemoglobin is around 5.5g/dl and he follows with Dr. Jordan Heme-Onc. Patient endorses that he woke up on the day of admission with a flare of back pain, worsening fatigue, headache, and dizziness. He visited Dr. Jordan and found to have a low hemoglobin on outpatient labs for which he was directed to the ED. Patient has some baseline jaundice, and bilateral swelling of his feet which have increased; he otherwise denies any productive cough, chest pain on exertion, acutely tender or swollen extremities, priapism, or difficulty urinating. He also denies any headaches, visual disturbances, N/V/D, falls, palpitations, fevers, or skin rash. He hasn't had a gout attack in some time. Of note, patient is intolerant to Hydrea (causes priapism), PCN, Rocephin, Zosyn; he was trailed on Oxbryta but it was stopped 2/2 to GI side effects. He normally uses Oxycodone 30mg IR Q6h PRN at home for pain, and has been using it more frequently for the past few days.     Patient is a 42 y/o male, from home, with significant medical history of Sickle Cell Disease, and Gout who presents with worsening back pain and bilateral lower chest pain; admitted for acute sickle cell crisis.   Of note, patient is intolerant to Hydrea (causes priapism), PCN, Rocephin, Zosyn; he was trailed on Oxbryta but it was stopped 2/2 to GI side effects. He normally uses Oxycodone 30mg IR Q6h PRN at home for pain, and has been using it more frequently for the past few days.     Patient was found was found to have a Hb of 4.4, bilirubin of 7.5, and a reticulocyte of 12.5 consistent with Sickle cell crisis. Patient does not meet features for acute chest syndrome. Patient has a baseline Hb ~5-6 as per Dr Jordan. Patient received 2 unites of packed RBCs along with 25mg benadryl and was on 2L O2. Patient reports severe lower back pain and received DILAUDID 3 mg IV push every three hours PRN and oxycodone 30mg po every 4 hours po PRN.  Patient was given Folic acid 1mg po bid and was hydrated with IV fluids sodium chloride 0.45%. 1000ml, 250ml/hr. Dr. Jordan recommends discharge today 2/25/22.    For the patient's gout, colchicine 0.6 mg po daily was given. Patient is a 42 y/o male, from home, with significant medical history of Sickle Cell Disease, and Gout who presents with worsening back pain and bilateral lower chest pain. He is a longstanding SCD patient with 2 crises and 4 units PRBC received since the last 6 months. He reports that he used to need monthly PRBC infusions and is relatively feeling better recently; his baseline hemoglobin is around 5.5g/dl and he follows with Dr. Jordan Heme-Onc. Patient endorses that he woke up on the day of admission with a flare of back pain, worsening fatigue, headache, and dizziness. He visited Dr. Jordan and found to have a low hemoglobin on outpatient labs for which he was directed to the ED. Patient has some baseline jaundice, and bilateral swelling of his feet which have increased; he otherwise denies any productive cough, chest pain on exertion, acutely tender or swollen extremities, priapism, or difficulty urinating. He also denies any headaches, visual disturbances, N/V/D, falls, palpitations, fevers, or skin rash. He hasn't had a gout attack in some time. Of note, patient is intolerant to Hydrea (causes priapism), PCN, Rocephin, Zosyn; he was trailed on Oxbryta but it was stopped 2/2 to GI side effects. He normally uses Oxycodone 30mg IR Q6h PRN at home for pain, and has been using it more frequently for the past few days.     Patient is a 42 y/o male, from home, with significant medical history of Sickle Cell Disease, and Gout who presents with worsening back pain and bilateral lower chest pain; admitted for acute sickle cell crisis.   Of note, patient is intolerant to Hydrea (causes priapism), PCN, Rocephin, Zosyn; he was trailed on Oxbryta but it was stopped 2/2 to GI side effects. He normally uses Oxycodone 30mg IR Q6h PRN at home for pain, and has been using it more frequently for the past few days.     Patient was found was found to have a Hb of 4.4, bilirubin of 7.5, and a reticulocyte of 12.5 consistent with Sickle cell crisis. Patient does not meet features for acute chest syndrome. Patient has a baseline Hb ~5-6 as per Dr Jordan. Patient received 2 unites of packed RBCs along with 25mg benadryl and was on 2L O2. Patient reports severe lower back pain and received DILAUDID 3 mg IV push every three hours PRN and oxycodone 30mg po every 4 hours po PRN.  Patient was given Folic acid 1mg po bid and was hydrated with IV fluids sodium chloride 0.45%. 1000ml, 250ml/hr. Dr. Jordan recommends discharge today 2/25/22.    For the patient's gout, colchicine 0.6 mg po daily and allopurinol 100mg po daily was given. Patient is a 40 y/o male, from home, with significant medical history of Sickle Cell Disease (follows with Dr. Jordan OP), and Gout who presents with worsening back pain and bilateral subcostal chest pain a/w w headache, fatigue and dizziness, had an appointment with Dr. Jordan (Onco) and was referred to the hospital due to hgb found to be low, He is a longstanding SCD patient with 2 crises and 4 units PRBC received since the last 6 months, morbidity of which he reports is improved from previous years. Patient has a baseline hemoglobin 5.5g/dl.     Of note, patient is intolerant to Hydrea (causes priapism), PCN, Rocephin, Zosyn; he was Tried on Oxbryta but it was stopped 2/2 to GI side effects. He normally uses Oxycodone 30mg IR Q6h PRN at home for pain, and has been using it more frequently for the past few days.     Patient was found was found to have a Hb of 4.4, bilirubin of 7.5, and a reticulocyte of 12.5 consistent with Sickle cell crisis. Patient does not meet features for acute chest syndrome. Patient received 2 units of packed RBCs along with 25mg benadryl and was on 2L O2 also received DILAUDID 3 mg IV push every three hours PRN and oxycodone 30mg po every 4 hours po PRN.  Patient was given Folic acid 1mg po bid and was hydrated with IV fluids sodium chloride 0.45%. 1000ml, 250ml/hr. Dr. Jordan cleared patient for discharged after the above intervention.    For the patient's gout, colchicine 0.6 mg po daily and allopurinol 100mg po daily was given.    Patient's pain is controlled. Post transfusion CBC shows hgb 6- baseline.  Patient is currently stable for discharge, labs and vitals medically optimized.  Please refer to the patient's chart for more details as this is only a brief summary. Patient is a 42 y/o male, from home, with significant medical history of Sickle Cell Disease (follows with Dr. Jordan OP), and Gout who presents with worsening back pain and bilateral subcostal chest pain a/w w headache, fatigue and dizziness, had an appointment with Dr. Jordan (Onco) and was referred to the hospital due to hgb found to be low, He is a longstanding SCD patient with 2 crises and 4 units PRBC received since the last 6 months, morbidity of which he reports is improved from previous years. Patient has a baseline hemoglobin 5.5g/dl.     Of note, patient is intolerant to Hydrea (causes priapism), PCN, Rocephin, Zosyn; he was Tried on Oxbryta but it was stopped 2/2 to GI side effects. He normally uses Oxycodone 30mg IR Q6h PRN at home for pain, and has been using it more frequently for the past few days.     Patient was found was found to have a Hb of 4.4, bilirubin of 7.5, and a reticulocyte of 12.5 consistent with Sickle cell crisis. Patient does not meet features for acute chest syndrome. Patient received 2 units of packed RBCs along with 25mg benadryl and was on 2L O2 also received DILAUDID 3 mg IV push every three hours PRN, that was taperred down to PO,  and oxycodone 30mg po every 4 hours po PRN.  Patient was given Folic acid 1mg po bid and was hydrated with IV fluids sodium chloride 0.45%. 1000ml, 250ml/hr. later switched to NS. Dr. Jordan cleared patient for discharged after the above intervention, specifically blood transfusions, which patient reports resolved the symptoms.    For the patient's gout, colchicine 0.6 mg po daily and allopurinol 100mg po daily was given.    Patient's pain is controlled. Post transfusion CBC shows hgb 6- baseline.  Patient is currently stable for discharge, labs and vitals medically optimized.  Please refer to the patient's chart for more details as this is only a brief summary.

## 2022-02-25 NOTE — H&P ADULT - PROBLEM SELECTOR PLAN 1
Pt with multiple prior sickle crises; not on hydrea due to intolerance   Now presenting with back and chest pain, acute drop in Hb, fatigue   Retic 12.5%, Hb 4.4, WBC 11.14, TBili 7.5  F/u Direct vs indirect bili  Will replace 2 PRBC and c/w aggressive IVF 1/2NS @250ml/hr x 8 hours - monitor for fluid overload and then titrate down to 125/hr.  No Abx as does not meet features for acute chest syndrome: although he has lateral chest pain, no new infiltrates on CXR and hypoxia resolved with pain medication  Dilaudid 3mg Q3h, oxycodone 30mg iR Q6h which is what pt Is usually on during these crises    C/w Folic acid, incentive spirometry  Pain consult  Dr. Julian olson onc who he follows with outpatient

## 2022-02-25 NOTE — H&P ADULT - ASSESSMENT
Patient is a 40 y/o male, from home, with significant medical history of Sickle Cell Disease, and Gout who presents with worsening back pain and bilateral lower chest pain; admitted for acute sickle cell crisis.     In the ED:  VS: 98.@F, , /66, RR 22 Spo2 87% on RA > improved to 95% on RA after pain medications  Labs significant for Hb 4.4, WBC11.4, Retic 12.5%, TBili 7.5 AST/ALT 54 Alk Phos 137

## 2022-02-25 NOTE — CONSULT NOTE ADULT - PROBLEM SELECTOR RECOMMENDATION 9
Pt with acute chest and back pain which is somatic in nature due to sickle cell crisis. + anemia h/h 4.4/12.3. Retic cont 12.5%.   Opioid pain recommendations   - Continue dilaudid 3mg IV q3h PRN severe pain. Monitor for sedation/ respiratory depression.   - May transition to home dose oxycodone 30mg PO q4h PRN severe pain once SCC improves.   Non-opioid pain recommendations   - Avoid NSIADs- anema   -  Acetaminophen 1 gram PO q 8 hours PRN moderate pain.   Bowel Regimen  - Continue Miralax 17G PO daily  - Continue Senna 2 tablets at bedtime for constipation  Mild pain   - Non-pharmacological pain treatment recommendations  - Warm/ Cool packs PRN   - Repositioning, imagery, relaxation, distraction.  - Physical therapy OOB if no contraindications   Recommendations discussed with primary team and RN

## 2022-02-25 NOTE — CONSULT NOTE ADULT - ASSESSMENT
41 year old male with SCD.  His baseline Hb 5-6.  he has been more tired lately.  no fever, chills, cp.    1. SCD, Hb down to 5 in my office.  It is 4.4 here  he will have 2 u PRBC today  he can be discharged after that    2. no crisis    3. unable to tolerate oxbryta or HU
Confidential Drug Utilization Report  Search Terms: Alex Downey, 1980Search Date: 02/25/2022 08:09:37 AM  The Drug Utilization Report below displays all of the controlled substance prescriptions, if any, that your patient has filled in the last twelve months. The information displayed on this report is compiled from pharmacy submissions to the Department, and accurately reflects the information as submitted by the pharmacies.    This report was requested by: Lina Cintron | Reference #: [id number]    You have not added a RAFAELA number. Keeping your RAFAELA number(s) up to date on the My RAFAELA # page will enable the separation of your prescriptions from others in the search results.    Others' Prescriptions  Patient Name: Alex DowneyBirth Date: 1980  Address: [address] [place] [id number]: Male  Rx Written	Rx Dispensed	Drug	Quantity	Days Supply	Prescriber Name	Prescriber Rafaela #	Payment Method	Dispenser  04/14/2021	04/14/2021	morphine sulfate ir 30 mg tab	24	7	Wilian Carty	[id number]	Insurance	[facility]  03/25/2021	03/25/2021	tramadol hcl 50 mg tablet	21	7	Yoselyn, Avril A NP	[id number]	Insurance	[facility]    Patient Name: Alex DowneyBirth Date: 1980  Address: [address] [place] [id number]: Male  Rx Written	Rx Dispensed	Drug	Quantity	Days Supply	Prescriber Name	Prescriber Rafaela #	Payment Method	Dispenser  12/30/2021	12/30/2021	oxycodone hcl (ir) 30 mg tab	180	30	Darrion Barron	[id number]	Insurance	[facility]   12/02/2021	12/02/2021	oxycodone hcl (ir) 30 mg tab	180	30	JordanShirley MD	[id number]	Insurance	[facility]   11/04/2021	11/05/2021	oxycodone hcl 30 mg tablet	180	30	JordanShirley MD	[id number]	Insurance	[facility]   10/07/2021	10/07/2021	oxycodone hcl 30 mg tablet	180	30	JordanShirley MD	[id number]	Insurance	[facility]   08/12/2021	08/12/2021	oxycodone hcl 30 mg tablet	180	30	Jordan, Shirley SOLITARIO	[id number]	Insurance	[facility]   07/15/2021	07/15/2021	oxycodone hcl 30 mg tablet	180	30	Jordan, Shirley SOLITARIO	[id number]	Insurance	[facility]   05/19/2021	05/19/2021	oxycodone hcl 30 mg tablet	180	30	Jordan, Shirley SOLITARIO	[id number]	Insurance	[facility]   * - Drugs marked with an asterisk are compound drugs. If the compound drug is made up of more than one controlled substance, then each controlled substance will be a separate row in the table.

## 2022-02-25 NOTE — DISCHARGE NOTE PROVIDER - ATTENDING DISCHARGE PHYSICAL EXAMINATION:
P/E:  Psych: AAO x3  Neuro: No gross focal deficits; Power and sensation intact  Chest: Right sided port  CVS: S1S2 present, regular, no edema  Resp: BLAE+, No wheeze or Rhonchi  GI: Soft, BS+, Non tender, non distended  Extr: No  calf tenderness B/L Lower extremities  Skin: Warm and moist without any rashes

## 2022-02-25 NOTE — DISCHARGE NOTE PROVIDER - NSDCFUADDINST_GEN_ALL_CORE_FT
ENCOURAGE VIGOROUS ORAL FLUIDS TO KEEP HYDRATED AND REDUCE PAIN  USE PAIN MEDICATIONS ONLY AS NEEDED   EXCESS SMOKING OF WEED IS DISCOURAGED  ENCOURAGE VIGOROUS ORAL FLUIDS TO KEEP HYDRATED AND REDUCE PAIN  USE PAIN MEDICATIONS ONLY AS NEEDED   EXCESS SMOKING OF WEED IS DISCOURAGED   CHECK WITH PCP IF YOU ALLOPURINOL DOSE NEEDS ADJUSTED FOR GOUT PROPHYLAXIS  RPEAT BLOOD WORK IN ONE WEEK TO CHECK BLOOD COUNT AND LIVER FUNCTIONS AND BILIRUBIN

## 2022-02-25 NOTE — DISCHARGE NOTE PROVIDER - NSDCCAREPROVSEEN_GEN_ALL_CORE_FT
Gerda, Jonathan Jordan, Shirley HugginsCape Cod [facility] Gerda, Jonathan Jordan, Shirley Huggins, Joy Dang

## 2022-02-25 NOTE — DISCHARGE NOTE PROVIDER - CARE PROVIDER_API CALL
Chuck Jordan  INTERNAL MEDICINE  [address] [place] [address]  Phone: [phone]  Fax: [phone]  Established Patient  Follow Up Time:

## 2022-02-26 ENCOUNTER — TRANSCRIPTION ENCOUNTER (OUTPATIENT)
Age: 42
End: 2022-02-26

## 2022-02-26 VITALS
DIASTOLIC BLOOD PRESSURE: 79 MMHG | RESPIRATION RATE: 18 BRPM | OXYGEN SATURATION: 93 % | SYSTOLIC BLOOD PRESSURE: 135 MMHG | TEMPERATURE: 98 F | HEART RATE: 83 BPM

## 2022-02-26 LAB
ALBUMIN SERPL ELPH-MCNC: 3 G/DL — LOW (ref 3.5–5)
ALP SERPL-CCNC: 127 U/L — HIGH (ref 40–120)
ALT FLD-CCNC: 49 U/L DA — SIGNIFICANT CHANGE UP (ref 10–60)
ANION GAP SERPL CALC-SCNC: 2 MMOL/L — LOW (ref 5–17)
AST SERPL-CCNC: 108 U/L — HIGH (ref 10–40)
BASOPHILS # BLD AUTO: 0.11 K/UL — SIGNIFICANT CHANGE UP (ref 0–0.2)
BASOPHILS NFR BLD AUTO: 1.2 % — SIGNIFICANT CHANGE UP (ref 0–2)
BILIRUB DIRECT SERPL-MCNC: 3.1 MG/DL — HIGH (ref 0–0.3)
BILIRUB SERPL-MCNC: 6.9 MG/DL — HIGH (ref 0.2–1.2)
BUN SERPL-MCNC: 20 MG/DL — HIGH (ref 7–18)
CALCIUM SERPL-MCNC: 8.5 MG/DL — SIGNIFICANT CHANGE UP (ref 8.4–10.5)
CHLORIDE SERPL-SCNC: 115 MMOL/L — HIGH (ref 96–108)
CO2 SERPL-SCNC: 24 MMOL/L — SIGNIFICANT CHANGE UP (ref 22–31)
CREAT SERPL-MCNC: 0.72 MG/DL — SIGNIFICANT CHANGE UP (ref 0.5–1.3)
EOSINOPHIL # BLD AUTO: 0.6 K/UL — HIGH (ref 0–0.5)
EOSINOPHIL NFR BLD AUTO: 6.4 % — HIGH (ref 0–6)
GLUCOSE SERPL-MCNC: 83 MG/DL — SIGNIFICANT CHANGE UP (ref 70–99)
HCT VFR BLD CALC: 17.4 % — CRITICAL LOW (ref 39–50)
HGB BLD-MCNC: 6.4 G/DL — CRITICAL LOW (ref 13–17)
IMM GRANULOCYTES NFR BLD AUTO: 0.3 % — SIGNIFICANT CHANGE UP (ref 0–1.5)
LYMPHOCYTES # BLD AUTO: 2.7 K/UL — SIGNIFICANT CHANGE UP (ref 1–3.3)
LYMPHOCYTES # BLD AUTO: 29 % — SIGNIFICANT CHANGE UP (ref 13–44)
MAGNESIUM SERPL-MCNC: 1.9 MG/DL — SIGNIFICANT CHANGE UP (ref 1.6–2.6)
MCHC RBC-ENTMCNC: 32.7 PG — SIGNIFICANT CHANGE UP (ref 27–34)
MCHC RBC-ENTMCNC: 36.8 GM/DL — HIGH (ref 32–36)
MCV RBC AUTO: 88.8 FL — SIGNIFICANT CHANGE UP (ref 80–100)
MONOCYTES # BLD AUTO: 1.27 K/UL — HIGH (ref 0–0.9)
MONOCYTES NFR BLD AUTO: 13.6 % — SIGNIFICANT CHANGE UP (ref 2–14)
NEUTROPHILS # BLD AUTO: 4.61 K/UL — SIGNIFICANT CHANGE UP (ref 1.8–7.4)
NEUTROPHILS NFR BLD AUTO: 49.5 % — SIGNIFICANT CHANGE UP (ref 43–77)
NRBC # BLD: 1 /100 WBCS — HIGH (ref 0–0)
PHOSPHATE SERPL-MCNC: 3.4 MG/DL — SIGNIFICANT CHANGE UP (ref 2.5–4.5)
PLATELET # BLD AUTO: 239 K/UL — SIGNIFICANT CHANGE UP (ref 150–400)
POTASSIUM SERPL-MCNC: 4.9 MMOL/L — SIGNIFICANT CHANGE UP (ref 3.5–5.3)
POTASSIUM SERPL-SCNC: 4.9 MMOL/L — SIGNIFICANT CHANGE UP (ref 3.5–5.3)
PROT SERPL-MCNC: 6.7 G/DL — SIGNIFICANT CHANGE UP (ref 6–8.3)
RBC # BLD: 1.96 M/UL — LOW (ref 4.2–5.8)
RBC # FLD: 21.4 % — HIGH (ref 10.3–14.5)
SODIUM SERPL-SCNC: 141 MMOL/L — SIGNIFICANT CHANGE UP (ref 135–145)
WBC # BLD: 9.32 K/UL — SIGNIFICANT CHANGE UP (ref 3.8–10.5)
WBC # FLD AUTO: 9.32 K/UL — SIGNIFICANT CHANGE UP (ref 3.8–10.5)

## 2022-02-26 PROCEDURE — 83735 ASSAY OF MAGNESIUM: CPT

## 2022-02-26 PROCEDURE — 85025 COMPLETE CBC W/AUTO DIFF WBC: CPT

## 2022-02-26 PROCEDURE — 85730 THROMBOPLASTIN TIME PARTIAL: CPT

## 2022-02-26 PROCEDURE — 36430 TRANSFUSION BLD/BLD COMPNT: CPT

## 2022-02-26 PROCEDURE — 82248 BILIRUBIN DIRECT: CPT

## 2022-02-26 PROCEDURE — P9040: CPT

## 2022-02-26 PROCEDURE — 85610 PROTHROMBIN TIME: CPT

## 2022-02-26 PROCEDURE — 96375 TX/PRO/DX INJ NEW DRUG ADDON: CPT

## 2022-02-26 PROCEDURE — 36415 COLL VENOUS BLD VENIPUNCTURE: CPT

## 2022-02-26 PROCEDURE — 80053 COMPREHEN METABOLIC PANEL: CPT

## 2022-02-26 PROCEDURE — 86901 BLOOD TYPING SEROLOGIC RH(D): CPT

## 2022-02-26 PROCEDURE — 82247 BILIRUBIN TOTAL: CPT

## 2022-02-26 PROCEDURE — 99285 EMERGENCY DEPT VISIT HI MDM: CPT

## 2022-02-26 PROCEDURE — 85045 AUTOMATED RETICULOCYTE COUNT: CPT

## 2022-02-26 PROCEDURE — 87635 SARS-COV-2 COVID-19 AMP PRB: CPT

## 2022-02-26 PROCEDURE — 71045 X-RAY EXAM CHEST 1 VIEW: CPT

## 2022-02-26 PROCEDURE — 84100 ASSAY OF PHOSPHORUS: CPT

## 2022-02-26 PROCEDURE — 86850 RBC ANTIBODY SCREEN: CPT

## 2022-02-26 PROCEDURE — 96374 THER/PROPH/DIAG INJ IV PUSH: CPT

## 2022-02-26 PROCEDURE — 93005 ELECTROCARDIOGRAM TRACING: CPT

## 2022-02-26 PROCEDURE — 99239 HOSP IP/OBS DSCHRG MGMT >30: CPT | Mod: GC

## 2022-02-26 PROCEDURE — 86900 BLOOD TYPING SEROLOGIC ABO: CPT

## 2022-02-26 PROCEDURE — 86922 COMPATIBILITY TEST ANTIGLOB: CPT

## 2022-02-26 RX ORDER — FOLIC ACID 0.8 MG
1 TABLET ORAL
Qty: 60 | Refills: 0
Start: 2022-02-26 | End: 2022-03-27

## 2022-02-26 RX ORDER — SENNA PLUS 8.6 MG/1
2 TABLET ORAL
Qty: 60 | Refills: 0
Start: 2022-02-26 | End: 2022-03-27

## 2022-02-26 RX ORDER — HYDROMORPHONE HYDROCHLORIDE 2 MG/ML
4 INJECTION INTRAMUSCULAR; INTRAVENOUS; SUBCUTANEOUS EVERY 4 HOURS
Refills: 0 | Status: DISCONTINUED | OUTPATIENT
Start: 2022-02-26 | End: 2022-02-26

## 2022-02-26 RX ORDER — OMEPRAZOLE 10 MG/1
1 CAPSULE, DELAYED RELEASE ORAL
Qty: 0 | Refills: 0 | DISCHARGE

## 2022-02-26 RX ORDER — POLYETHYLENE GLYCOL 3350 17 G/17G
17 POWDER, FOR SOLUTION ORAL
Qty: 510 | Refills: 0
Start: 2022-02-26 | End: 2022-03-27

## 2022-02-26 RX ADMIN — SODIUM CHLORIDE 150 MILLILITER(S): 9 INJECTION INTRAMUSCULAR; INTRAVENOUS; SUBCUTANEOUS at 04:01

## 2022-02-26 RX ADMIN — HYDROMORPHONE HYDROCHLORIDE 2 MILLIGRAM(S): 2 INJECTION INTRAMUSCULAR; INTRAVENOUS; SUBCUTANEOUS at 00:20

## 2022-02-26 RX ADMIN — Medication 100 UNIT(S): at 15:53

## 2022-02-26 RX ADMIN — HYDROMORPHONE HYDROCHLORIDE 2 MILLIGRAM(S): 2 INJECTION INTRAMUSCULAR; INTRAVENOUS; SUBCUTANEOUS at 04:13

## 2022-02-26 RX ADMIN — Medication 1 MILLIGRAM(S): at 09:33

## 2022-02-26 RX ADMIN — Medication 0.6 MILLIGRAM(S): at 12:55

## 2022-02-26 RX ADMIN — HYDROMORPHONE HYDROCHLORIDE 2 MILLIGRAM(S): 2 INJECTION INTRAMUSCULAR; INTRAVENOUS; SUBCUTANEOUS at 05:08

## 2022-02-26 RX ADMIN — HYDROMORPHONE HYDROCHLORIDE 2 MILLIGRAM(S): 2 INJECTION INTRAMUSCULAR; INTRAVENOUS; SUBCUTANEOUS at 10:39

## 2022-02-26 RX ADMIN — Medication 300 MILLIGRAM(S): at 12:56

## 2022-02-26 RX ADMIN — HYDROMORPHONE HYDROCHLORIDE 2 MILLIGRAM(S): 2 INJECTION INTRAMUSCULAR; INTRAVENOUS; SUBCUTANEOUS at 09:33

## 2022-02-26 RX ADMIN — SODIUM CHLORIDE 150 MILLILITER(S): 9 INJECTION INTRAMUSCULAR; INTRAVENOUS; SUBCUTANEOUS at 04:13

## 2022-02-26 RX ADMIN — PANTOPRAZOLE SODIUM 40 MILLIGRAM(S): 20 TABLET, DELAYED RELEASE ORAL at 05:12

## 2022-02-26 RX ADMIN — HYDROMORPHONE HYDROCHLORIDE 4 MILLIGRAM(S): 2 INJECTION INTRAMUSCULAR; INTRAVENOUS; SUBCUTANEOUS at 12:56

## 2022-02-26 RX ADMIN — HYDROMORPHONE HYDROCHLORIDE 2 MILLIGRAM(S): 2 INJECTION INTRAMUSCULAR; INTRAVENOUS; SUBCUTANEOUS at 00:50

## 2022-02-26 NOTE — PROGRESS NOTE ADULT - PROBLEM SELECTOR PLAN 4
hold chemical DVt ppx due to anemia   IMPROVE VTE Individual Risk Assessment  RISK                                                                Points  [  ] Previous VTE                                                  3  [ x ] Thrombophilia                                               2  [  ] Lower limb paralysis                                      2        (unable to hold up >15 seconds)    [  ] Current Cancer                                              2         (within 6 months)  [ x ] Immobilization > 24 hrs                                1  [  ] ICU/CCU stay > 24 hours                              1  [  ] Age > 60                                                      1  IMPROVE VTE Score ___3______
hold chemical DVt ppx due to anemia   IMPROVE VTE Individual Risk Assessment  RISK                                                                Points  [  ] Previous VTE                                                  3  [ x ] Thrombophilia                                               2  [  ] Lower limb paralysis                                      2        (unable to hold up >15 seconds)    [  ] Current Cancer                                              2         (within 6 months)  [ x ] Immobilization > 24 hrs                                1  [  ] ICU/CCU stay > 24 hours                              1  [  ] Age > 60                                                      1  IMPROVE VTE Score ___3______

## 2022-02-26 NOTE — PROGRESS NOTE ADULT - ATTENDING COMMENTS
Patient seen and examined with Housestaff    40 y/o male, from home, with significant medical history of Sickle Cell Disease, and Hx of Gout who presents with worsening back pain and bilateral lower chest pain; admitted for acute sickle cell crisis. Patient was seen at Dr. Jordan's clinic was noted to have low hb and  thus sent to ED. No acute chest pain syndrome.    Vital Signs Last 24 Hrs  T(C): 36.9 (26 Feb 2022 13:52), Max: 36.9 (26 Feb 2022 04:34)  T(F): 98.4 (26 Feb 2022 13:52), Max: 98.4 (26 Feb 2022 04:34)  HR: 83 (26 Feb 2022 13:52) (75 - 83)  BP: 135/79 (26 Feb 2022 13:52) (131/76 - 135/83)  RR: 18 (26 Feb 2022 13:52) (18 - 18)  SpO2: 93% (26 Feb 2022 13:52) (93% - 93%)    P/E:  Psych: AAO x3  Neuro: No gross focal deficits; Power and sensation intact  Chest: Right sided port  CVS: S1S2 present, regular, no edema  Resp: BLAE+, No wheeze or Rhonchi  GI: Soft, BS+, Non tender, non distended  Extr: No  calf tenderness B/L Lower extremities  Skin: Warm and moist without any rashes    Labs:                        6.4    9.32  )-----------( 239      ( 26 Feb 2022 06:35 )             17.4   02-26    141  |  115<H>  |  20<H>  ----------------------------<  83  4.9   |  24  |  0.72    Ca    8.5      26 Feb 2022 06:35  Phos  3.4     02-26  Mg     1.9     02-26    TPro  6.7  /  Alb  3.0<L>  /  TBili  6.9<H>  /  DBili  3.1<H>  /  AST  108<H>  /  ALT  49  /  AlkPhos  127<H>  02-26      D/D:  Acute sickle cell crisis with Sy,ptomatic Anemia  Hyperbilirubinemia likely due to active sickling   Hx Gout    Plan:  Patient doing much better; Pain better controlled  Reports has Oxycodone at home which he just filled up prescription just before coming tohospital  H/H stable after 2 units; No active bleeding.   Encouraged adequate fluids  not on hydrea due to intolerance   Follow up with Dr. Jordan next week at office and get blood works repeated in a week or sop to monitor Hgb and Bilirubin  Incentive spirometry  Increase Folate to BID on discharge     Discussed with Dr. Jordan; agree with D/C Home; F/U with him in office  Continue Allopurinol and Colchicine; will consider increase Allopurinol to 300 mg daily if okay with Dr. Jordan  as right dose unless renal insufficiency  F/U PCP    Discussed with patient findings and plan of care; agree with Plan  Discussed with PGY1 Dr. Lorenz    See discharge instructions reviewed and updated for details  Discussed with MS3/ PGY1 and PGY3

## 2022-02-26 NOTE — PROGRESS NOTE ADULT - ASSESSMENT
Patient is a 42 y/o male, from home, with significant medical history of Sickle Cell Disease, and Gout who presents with worsening back pain and bilateral lower chest pain; admitted for acute sickle cell crisis.     In the ED:  VS: 98.@F, , /66, RR 22 Spo2 87% on RA > improved to 95% on RA after pain medications  Labs significant for Hb 4.4, WBC11.4, Retic 12.5%, TBili 7.5 AST/ALT 54 Alk Phos 137
Patient is a 40 y/o male, from home, with significant medical history of Sickle Cell Disease, and Gout who presents with worsening back pain and bilateral lower chest pain; admitted for acute sickle cell crisis.     In the ED:  VS: 98.@F, , /66, RR 22 Spo2 87% on RA > improved to 95% on RA after pain medications  Labs significant for Hb 4.4, WBC11.4, Retic 12.5%, TBili 7.5 AST/ALT 54 Alk Phos 137

## 2022-02-26 NOTE — PROGRESS NOTE ADULT - PROBLEM SELECTOR PLAN 1
Pt with multiple prior sickle crises; not on hydrea due to intolerance   Now presenting with back and chest pain, acute drop in Hb, fatigue   Retic 12.5%, Hb 4.4, WBC 11.14, TBili 7.5  F/u Direct vs indirect bili  Will replace 2 PRBC and c/w aggressive IVF 1/2NS @250ml/hr x 8 hours - monitor for fluid overload and then titrate down to 125/hr.  No Abx as does not meet features for acute chest syndrome: although he has lateral chest pain, no new infiltrates on CXR and hypoxia resolved with pain medication  Dilaudid 3mg Q3h, oxycodone 30mg iR Q6h which is what pt Is usually on during these crises    C/w Folic acid BID, incentive spirometry  Pain consult  Dr. Julian olson onc who he follows with outpatient- stated to DC after transfusion if hgb is back to baseline 5.5
Pt with multiple prior sickle crises; not on hydrea due to intolerance   Now presenting with back and chest pain, acute drop in Hb, fatigue   Retic 12.5%, Hb 4.4, WBC 11.14, TBili 7.5  Patient has direct bili also elevated  no thrombocytopenia  -s/p 2 units of PRBC and IV fluids hgb now at baseline  -No Abx as does not meet features for acute chest syndrom  - Dilaudid 2mg Q4h, oxycodone 30mg iR Q6h pain now at baseline   -C/w Folic acid BID, incentive spirometry  -switch to dilaudid 4mg PO q4 PRN and DC on 2mg for 5 days to self taper

## 2022-02-26 NOTE — PROGRESS NOTE ADULT - PROBLEM SELECTOR PLAN 2
Pt admitted with Hb 4.4, baseline 5.5   Likely 2/2 to acute sickle crisis, good bone marrow response d/t retic  Check Peripheral smear   C/w 2 PRBC and recheck post transfusion CBC
Pt admitted with Hb 4.4, baseline 5.5   Likely 2/2 to acute sickle crisis, good bone marrow response d/t retic  Check Peripheral smear   C/w 2 PRBC and recheck post transfusion CBC  hgb stable at 6.4 baseline 5.5

## 2022-02-26 NOTE — DISCHARGE NOTE NURSING/CASE MANAGEMENT/SOCIAL WORK - PATIENT PORTAL LINK FT
You can access the FollowMyHealth Patient Portal offered by [facility] by registering at the following website: [email or web address]. By joining [facility]’s FollowMyHealth portal, you will also be able to view your health information using other applications (apps) compatible with our system.

## 2022-02-26 NOTE — PROGRESS NOTE ADULT - SUBJECTIVE AND OBJECTIVE BOX
PGY-1 Progress Note discussed with attending    PAGER #: [[phone]] TILL 5:00 PM  PLEASE CONTACT ON CALL TEAM:  - On Call Team (Please refer to Rubi) FROM 5:00 PM - 8:30PM  - Nightfloat Team FROM 8:30 -7:30 AM    CHIEF COMPLAINT & BRIEF HOSPITAL COURSE:    Patient is a 40 y/o male, from home, with significant medical history of Sickle Cell Disease (follows with Dr. Jordan OP), and Gout who presents with worsening back pain and bilateral subcostal chest pain a/w w headache, fatigue and dizziness, had an appointment with Dr. Jordan (Onco) and was referred to the hospital due to hgb found to be low, He is a longstanding SCD patient with 2 crises and 4 units PRBC received since the last 6 months, morbidity of which he reports is improved from previous years. Patient has a baseline hemoglobin 5.5g/dl.     Of note, patient is intolerant to Hydrea (causes priapism), PCN, Rocephin, Zosyn; he was Tried on Oxbryta but it was stopped 2/2 to GI side effects. He normally uses Oxycodone 30mg IR Q6h PRN at home for pain, and has been using it more frequently for the past few days.       In the ED:  Hb of 4.4, bilirubin of 7.5, and a reticulocyte of 12.5 consistent with Sickle cell crisis. Patient does not meet features for acute chest syndrome.  -Patient received 2 units of packed RBCs along with 25mg benadryl and was on 2L O2 also   - DILAUDID 3 mg IV push q3h PRN   -oxycodone 30mg po every 4 hours po PRN.   - Patient was given Folic acid 1mg po bid and was hydrated with IV fluids sodium chloride 0.45%. 1000ml, 250ml/hr. later maintained on NS  - Dr. Jordan cleared patient for discharged after the above intervention.          INTERVAL HPI/OVERNIGHT EVENTS:   no events  now using 2mg dilaudid IV q5h PRN  Pain at baseline per patient    MEDICATIONS  (STANDING):  allopurinol 300 milliGRAM(s) Oral daily  colchicine 0.6 milliGRAM(s) Oral daily  folic acid 1 milliGRAM(s) Oral <User Schedule>  pantoprazole    Tablet 40 milliGRAM(s) Oral before breakfast  polyethylene glycol 3350 17 Gram(s) Oral daily  senna 2 Tablet(s) Oral at bedtime  sodium chloride 0.9%. 1000 milliLiter(s) (150 mL/Hr) IV Continuous <Continuous>    MEDICATIONS  (PRN):  acetaminophen     Tablet .. 1000 milliGRAM(s) Oral every 8 hours PRN Moderate Pain (4 - 6)  aluminum hydroxide/magnesium hydroxide/simethicone Suspension 30 milliLiter(s) Oral every 4 hours PRN Dyspepsia  HYDROmorphone  Injectable 2 milliGRAM(s) IV Push every 4 hours PRN Severe Pain (7 - 10)  melatonin 3 milliGRAM(s) Oral at bedtime PRN Insomnia  ondansetron Injectable 4 milliGRAM(s) IV Push every 8 hours PRN Nausea and/or Vomiting      REVIEW OF SYSTEMS:  CONSTITUTIONAL: No fever, weight loss, or fatigue  RESPIRATORY: No cough, wheezing, chills or hemoptysis; No shortness of breath  CARDIOVASCULAR: No chest pain, palpitations, dizziness, or leg swelling  GASTROINTESTINAL: No abdominal pain. No nausea, vomiting, or hematemesis; No diarrhea or constipation. No melena or hematochezia.  GENITOURINARY: No dysuria or hematuria, urinary frequency  NEUROLOGICAL: No headaches, memory loss, loss of strength, numbness, or tremors  SKIN: No itching, burning, rashes, or lesions     Vital Signs Last 24 Hrs  T(C): 36.9 (26 Feb 2022 04:34), Max: 36.9 (26 Feb 2022 04:34)  T(F): 98.4 (26 Feb 2022 04:34), Max: 98.4 (26 Feb 2022 04:34)  HR: 78 (26 Feb 2022 04:34) (75 - 86)  BP: 131/76 (26 Feb 2022 04:34) (109/67 - 135/83)  BP(mean): --  RR: 18 (26 Feb 2022 04:34) (17 - 18)  SpO2: 93% (26 Feb 2022 04:34) (87% - 93%)    PHYSICAL EXAMINATION:  GENERAL: NAD  HEAD:  Atraumatic, Normocephalic  EYES:  conjunctiva and sclera clear  NECK: Supple, No JVD  CHEST/LUNG: Clear to auscultation. Clear to percussion bilaterally; No rales, rhonchi, wheezing, or rubs  HEART: Regular rate and rhythm; No murmurs, rubs, or gallops  ABDOMEN: Soft, Nontender, Nondistended; Bowel sounds present  NERVOUS SYSTEM:  Alert & Oriented X3,    EXTREMITIES:  2+ Peripheral Pulses, No clubbing, cyanosis, or edema  SKIN: warm dry  MSK: Back exam, pin point tenderness on the spinous processes of the lumbar spine, paraspinal lumbar spine tenderness                          6.4    9.32  )-----------( 239      ( 26 Feb 2022 06:35 )             17.4     02-26    141  |  115<H>  |  20<H>  ----------------------------<  83  4.9   |  24  |  0.72    Ca    8.5      26 Feb 2022 06:35  Phos  3.4     02-26  Mg     1.9     02-26    TPro  6.7  /  Alb  3.0<L>  /  TBili  6.9<H>  /  DBili  3.1<H>  /  AST  108<H>  /  ALT  49  /  AlkPhos  127<H>  02-26    LIVER FUNCTIONS - ( 26 Feb 2022 06:35 )  Alb: 3.0 g/dL / Pro: 6.7 g/dL / ALK PHOS: 127 U/L / ALT: 49 U/L DA / AST: 108 U/L / GGT: x               PT/INR - ( 24 Feb 2022 23:07 )   PT: 13.3 sec;   INR: 1.12 ratio         PTT - ( 24 Feb 2022 23:07 )  PTT:34.9 sec    CAPILLARY BLOOD GLUCOSE      RADIOLOGY & ADDITIONAL TESTS:                  
PGY-1 Progress Note discussed with attending    PAGER #: [[phone]] TILL 5:00 PM  PLEASE CONTACT ON CALL TEAM:  - On Call Team (Please refer to Rubi) FROM 5:00 PM - 8:30PM  - Nightfloat Team FROM 8:30 -7:30 AM    CHIEF COMPLAINT & BRIEF HOSPITAL COURSE:    Patient is a 42 y/o male, from home, with significant medical history of Sickle Cell Disease (follows with Dr. Jordan OP), and Gout who presents with worsening back pain and bilateral subcostal chest pain a/w w headache, fatigue and dizziness, had an appointment with Dr. Jordan (Onco) and was referred to the hospital due to hgb found to be 4.7 , He is a longstanding SCD patient with 2 crises and 4 units PRBC received since the last 6 months, morbidity of which he reports is improved from previous years. Patient has a baseline hemoglobin 5.5g/dl.     Of note, patient is intolerant to Hydrea (causes priapism), PCN, Rocephin, Zosyn; he was Tried on Oxbryta but it was stopped 2/2 to GI side effects. He normally uses Oxycodone 30mg IR Q6h PRN at home for pain, and has been using it more frequently for the past few days.       On presentation he denies any productive cough, chest pain on exertion, acutely tender or swollen extremities, priapism, or difficulty urinating. He also denies any headaches, visual disturbances, N/V/D, falls, palpitations, fevers, or skin rash. He hasn't had a gout attack in some time.     INTERVAL HPI/OVERNIGHT EVENTS:   no events  admitted overnight    MEDICATIONS  (STANDING):  allopurinol 100 milliGRAM(s) Oral daily  colchicine 0.6 milliGRAM(s) Oral daily  folic acid 1 milliGRAM(s) Oral <User Schedule>  pantoprazole    Tablet 40 milliGRAM(s) Oral before breakfast  polyethylene glycol 3350 17 Gram(s) Oral daily  senna 2 Tablet(s) Oral at bedtime  sodium chloride 0.45%. 1000 milliLiter(s) (250 mL/Hr) IV Continuous <Continuous>    MEDICATIONS  (PRN):  acetaminophen     Tablet .. 1000 milliGRAM(s) Oral every 8 hours PRN Moderate Pain (4 - 6)  aluminum hydroxide/magnesium hydroxide/simethicone Suspension 30 milliLiter(s) Oral every 4 hours PRN Dyspepsia  HYDROmorphone  Injectable 3 milliGRAM(s) IV Push every 3 hours PRN Severe Pain (7 - 10)  melatonin 3 milliGRAM(s) Oral at bedtime PRN Insomnia  ondansetron Injectable 4 milliGRAM(s) IV Push every 8 hours PRN Nausea and/or Vomiting      REVIEW OF SYSTEMS:  CONSTITUTIONAL: No fever, weight loss, or fatigue  RESPIRATORY: No cough, wheezing, chills or hemoptysis; No shortness of breath  CARDIOVASCULAR:+ chest pain, palpitations, dizziness, or leg swelling  GASTROINTESTINAL: No abdominal pain. No nausea, vomiting, or hematemesis; No diarrhea or constipation. No melena or hematochezia.  GENITOURINARY: No dysuria or hematuria, urinary frequency  NEUROLOGICAL: No headaches, memory loss, loss of strength, numbness, or tremors  SKIN: + itching, burning, rashes, or lesions   MSK : +subcostal and back pain    Vital Signs Last 24 Hrs  T(C): 36.5 (25 Feb 2022 09:45), Max: 36.9 (25 Feb 2022 04:44)  T(F): 97.7 (25 Feb 2022 09:45), Max: 98.5 (25 Feb 2022 04:44)  HR: 76 (25 Feb 2022 09:45) (75 - 103)  BP: 132/78 (25 Feb 2022 09:45) (120/64 - 134/78)  BP(mean): --  RR: 16 (25 Feb 2022 09:45) (16 - 22)  SpO2: 97% (25 Feb 2022 09:45) (87% - 97%)    PHYSICAL EXAMINATION:  GENERAL: NAD, moderate distress  HEAD:  Atraumatic, Normocephalic  EYES:  conjunctiva and sclera clear  NECK: Supple, No JVD  CHEST/LUNG: Clear to auscultation. Clear to percussion bilaterally; No rales, rhonchi, wheezing, or rubs  HEART: Regular rate and rhythm; No murmurs, rubs, or gallops  ABDOMEN: Soft, Nontender, Nondistended; Bowel sounds present  NERVOUS SYSTEM:  Alert & Oriented X3,    EXTREMITIES:  2+ Peripheral Pulses, No clubbing, cyanosis, or edema  SKIN: warm dry  MSK: Back exam, pin point tenderness on the spinous processes of the lumbar spine, paraspinal lumbar spine tenderness                          4.4    11.14 )-----------( 231      ( 24 Feb 2022 23:07 )             12.3     02-24    138  |  109<H>  |  27<H>  ----------------------------<  95  4.7   |  24  |  1.27    Ca    8.8      24 Feb 2022 23:07    TPro  7.5  /  Alb  3.3<L>  /  TBili  7.5<H>  /  DBili  x   /  AST  124<H>  /  ALT  54  /  AlkPhos  137<H>  02-24    LIVER FUNCTIONS - ( 24 Feb 2022 23:07 )  Alb: 3.3 g/dL / Pro: 7.5 g/dL / ALK PHOS: 137 U/L / ALT: 54 U/L DA / AST: 124 U/L / GGT: x               PT/INR - ( 24 Feb 2022 23:07 )   PT: 13.3 sec;   INR: 1.12 ratio         PTT - ( 24 Feb 2022 23:07 )  PTT:34.9 sec    CAPILLARY BLOOD GLUCOSE      RADIOLOGY & ADDITIONAL TESTS:

## 2022-02-28 ENCOUNTER — INPATIENT (INPATIENT)
Facility: HOSPITAL | Age: 42
LOS: 6 days | Discharge: HOME | DRG: 811 | End: 2022-03-07
Attending: STUDENT IN AN ORGANIZED HEALTH CARE EDUCATION/TRAINING PROGRAM | Admitting: STUDENT IN AN ORGANIZED HEALTH CARE EDUCATION/TRAINING PROGRAM
Payer: MEDICAID

## 2022-02-28 VITALS
WEIGHT: 189.6 LBS | OXYGEN SATURATION: 90 % | HEIGHT: 69 IN | TEMPERATURE: 99 F | HEART RATE: 112 BPM | SYSTOLIC BLOOD PRESSURE: 145 MMHG | DIASTOLIC BLOOD PRESSURE: 84 MMHG | RESPIRATION RATE: 22 BRPM

## 2022-02-28 DIAGNOSIS — M10.9 GOUT, UNSPECIFIED: ICD-10-CM

## 2022-02-28 DIAGNOSIS — D57.00 HB-SS DISEASE WITH CRISIS, UNSPECIFIED: ICD-10-CM

## 2022-02-28 DIAGNOSIS — K29.70 GASTRITIS, UNSPECIFIED, WITHOUT BLEEDING: ICD-10-CM

## 2022-02-28 DIAGNOSIS — Z90.49 ACQUIRED ABSENCE OF OTHER SPECIFIED PARTS OF DIGESTIVE TRACT: Chronic | ICD-10-CM

## 2022-02-28 DIAGNOSIS — Z29.9 ENCOUNTER FOR PROPHYLACTIC MEASURES, UNSPECIFIED: ICD-10-CM

## 2022-02-28 LAB
ALBUMIN SERPL ELPH-MCNC: 3.2 G/DL — LOW (ref 3.5–5)
ALP SERPL-CCNC: 128 U/L — HIGH (ref 40–120)
ALT FLD-CCNC: 46 U/L DA — SIGNIFICANT CHANGE UP (ref 10–60)
ANION GAP SERPL CALC-SCNC: 7 MMOL/L — SIGNIFICANT CHANGE UP (ref 5–17)
APPEARANCE UR: CLEAR — SIGNIFICANT CHANGE UP
APTT BLD: >200 SEC — CRITICAL HIGH (ref 27.5–35.5)
AST SERPL-CCNC: 101 U/L — HIGH (ref 10–40)
BACTERIA # UR AUTO: ABNORMAL /HPF
BASOPHILS # BLD AUTO: 0.05 K/UL — SIGNIFICANT CHANGE UP (ref 0–0.2)
BASOPHILS NFR BLD AUTO: 0.5 % — SIGNIFICANT CHANGE UP (ref 0–2)
BILIRUB SERPL-MCNC: 8.1 MG/DL — HIGH (ref 0.2–1.2)
BILIRUB UR-MCNC: NEGATIVE — SIGNIFICANT CHANGE UP
BUN SERPL-MCNC: 26 MG/DL — HIGH (ref 7–18)
CALCIUM SERPL-MCNC: 8.9 MG/DL — SIGNIFICANT CHANGE UP (ref 8.4–10.5)
CHLORIDE SERPL-SCNC: 112 MMOL/L — HIGH (ref 96–108)
CO2 SERPL-SCNC: 21 MMOL/L — LOW (ref 22–31)
COLOR SPEC: YELLOW — SIGNIFICANT CHANGE UP
COMMENT - URINE: SIGNIFICANT CHANGE UP
CREAT SERPL-MCNC: 0.94 MG/DL — SIGNIFICANT CHANGE UP (ref 0.5–1.3)
DIFF PNL FLD: ABNORMAL
EOSINOPHIL # BLD AUTO: 0.21 K/UL — SIGNIFICANT CHANGE UP (ref 0–0.5)
EOSINOPHIL NFR BLD AUTO: 2.2 % — SIGNIFICANT CHANGE UP (ref 0–6)
GLUCOSE SERPL-MCNC: 97 MG/DL — SIGNIFICANT CHANGE UP (ref 70–99)
GLUCOSE UR QL: NEGATIVE — SIGNIFICANT CHANGE UP
HCT VFR BLD CALC: 17.3 % — CRITICAL LOW (ref 39–50)
HGB BLD-MCNC: 6.4 G/DL — CRITICAL LOW (ref 13–17)
IMM GRANULOCYTES NFR BLD AUTO: 0.4 % — SIGNIFICANT CHANGE UP (ref 0–1.5)
INR BLD: 1.17 RATIO — HIGH (ref 0.88–1.16)
KETONES UR-MCNC: NEGATIVE — SIGNIFICANT CHANGE UP
LACTATE SERPL-SCNC: 1.6 MMOL/L — SIGNIFICANT CHANGE UP (ref 0.7–2)
LEUKOCYTE ESTERASE UR-ACNC: NEGATIVE — SIGNIFICANT CHANGE UP
LIDOCAIN IGE QN: 234 U/L — SIGNIFICANT CHANGE UP (ref 73–393)
LYMPHOCYTES # BLD AUTO: 0.66 K/UL — LOW (ref 1–3.3)
LYMPHOCYTES # BLD AUTO: 6.9 % — LOW (ref 13–44)
MCHC RBC-ENTMCNC: 32.7 PG — SIGNIFICANT CHANGE UP (ref 27–34)
MCHC RBC-ENTMCNC: 37 GM/DL — HIGH (ref 32–36)
MCV RBC AUTO: 88.3 FL — SIGNIFICANT CHANGE UP (ref 80–100)
MONOCYTES # BLD AUTO: 0.54 K/UL — SIGNIFICANT CHANGE UP (ref 0–0.9)
MONOCYTES NFR BLD AUTO: 5.7 % — SIGNIFICANT CHANGE UP (ref 2–14)
NEUTROPHILS # BLD AUTO: 8.02 K/UL — HIGH (ref 1.8–7.4)
NEUTROPHILS NFR BLD AUTO: 84.3 % — HIGH (ref 43–77)
NITRITE UR-MCNC: NEGATIVE — SIGNIFICANT CHANGE UP
NRBC # BLD: 0 /100 WBCS — SIGNIFICANT CHANGE UP (ref 0–0)
PH UR: 6 — SIGNIFICANT CHANGE UP (ref 5–8)
PLATELET # BLD AUTO: 263 K/UL — SIGNIFICANT CHANGE UP (ref 150–400)
POTASSIUM SERPL-MCNC: 4.3 MMOL/L — SIGNIFICANT CHANGE UP (ref 3.5–5.3)
POTASSIUM SERPL-SCNC: 4.3 MMOL/L — SIGNIFICANT CHANGE UP (ref 3.5–5.3)
PROT SERPL-MCNC: 7.4 G/DL — SIGNIFICANT CHANGE UP (ref 6–8.3)
PROT UR-MCNC: 100
PROTHROM AB SERPL-ACNC: 13.9 SEC — HIGH (ref 10.5–13.4)
RBC # BLD: 1.96 M/UL — LOW (ref 4.2–5.8)
RBC # BLD: 1.96 M/UL — LOW (ref 4.2–5.8)
RBC # FLD: 20.8 % — HIGH (ref 10.3–14.5)
RBC CASTS # UR COMP ASSIST: ABNORMAL /HPF (ref 0–2)
RETICS #: 218.3 K/UL — HIGH (ref 25–125)
RETICS/RBC NFR: 11.1 % — HIGH (ref 0.5–2.5)
SARS-COV-2 RNA SPEC QL NAA+PROBE: SIGNIFICANT CHANGE UP
SODIUM SERPL-SCNC: 140 MMOL/L — SIGNIFICANT CHANGE UP (ref 135–145)
SP GR SPEC: 1.01 — SIGNIFICANT CHANGE UP (ref 1.01–1.02)
UROBILINOGEN FLD QL: 1
WBC # BLD: 9.52 K/UL — SIGNIFICANT CHANGE UP (ref 3.8–10.5)
WBC # FLD AUTO: 9.52 K/UL — SIGNIFICANT CHANGE UP (ref 3.8–10.5)
WBC UR QL: SIGNIFICANT CHANGE UP /HPF (ref 0–5)

## 2022-02-28 PROCEDURE — 99284 EMERGENCY DEPT VISIT MOD MDM: CPT

## 2022-02-28 PROCEDURE — 99223 1ST HOSP IP/OBS HIGH 75: CPT | Mod: GC

## 2022-02-28 RX ORDER — ENOXAPARIN SODIUM 100 MG/ML
40 INJECTION SUBCUTANEOUS DAILY
Refills: 0 | Status: DISCONTINUED | OUTPATIENT
Start: 2022-02-28 | End: 2022-03-07

## 2022-02-28 RX ORDER — ENOXAPARIN SODIUM 100 MG/ML
40 INJECTION SUBCUTANEOUS DAILY
Refills: 0 | Status: DISCONTINUED | OUTPATIENT
Start: 2022-02-28 | End: 2022-02-28

## 2022-02-28 RX ORDER — SODIUM CHLORIDE 9 MG/ML
1000 INJECTION, SOLUTION INTRAVENOUS
Refills: 0 | Status: DISCONTINUED | OUTPATIENT
Start: 2022-02-28 | End: 2022-03-01

## 2022-02-28 RX ORDER — HYDROMORPHONE HYDROCHLORIDE 2 MG/ML
2 INJECTION INTRAMUSCULAR; INTRAVENOUS; SUBCUTANEOUS ONCE
Refills: 0 | Status: DISCONTINUED | OUTPATIENT
Start: 2022-02-28 | End: 2022-02-28

## 2022-02-28 RX ORDER — PANTOPRAZOLE SODIUM 20 MG/1
40 TABLET, DELAYED RELEASE ORAL DAILY
Refills: 0 | Status: DISCONTINUED | OUTPATIENT
Start: 2022-02-28 | End: 2022-03-01

## 2022-02-28 RX ORDER — OXYCODONE HYDROCHLORIDE 5 MG/1
5 TABLET ORAL EVERY 4 HOURS
Refills: 0 | Status: DISCONTINUED | OUTPATIENT
Start: 2022-02-28 | End: 2022-02-28

## 2022-02-28 RX ORDER — ONDANSETRON 8 MG/1
4 TABLET, FILM COATED ORAL EVERY 8 HOURS
Refills: 0 | Status: DISCONTINUED | OUTPATIENT
Start: 2022-02-28 | End: 2022-02-28

## 2022-02-28 RX ORDER — SODIUM CHLORIDE 9 MG/ML
1000 INJECTION INTRAMUSCULAR; INTRAVENOUS; SUBCUTANEOUS ONCE
Refills: 0 | Status: COMPLETED | OUTPATIENT
Start: 2022-02-28 | End: 2022-02-28

## 2022-02-28 RX ORDER — SODIUM CHLORIDE 9 MG/ML
1000 INJECTION INTRAMUSCULAR; INTRAVENOUS; SUBCUTANEOUS
Refills: 0 | Status: DISCONTINUED | OUTPATIENT
Start: 2022-02-28 | End: 2022-02-28

## 2022-02-28 RX ORDER — ACETAMINOPHEN 500 MG
650 TABLET ORAL EVERY 6 HOURS
Refills: 0 | Status: DISCONTINUED | OUTPATIENT
Start: 2022-02-28 | End: 2022-03-07

## 2022-02-28 RX ORDER — ONDANSETRON 8 MG/1
4 TABLET, FILM COATED ORAL EVERY 6 HOURS
Refills: 0 | Status: DISCONTINUED | OUTPATIENT
Start: 2022-02-28 | End: 2022-03-07

## 2022-02-28 RX ORDER — COLCHICINE 0.6 MG
0.6 TABLET ORAL DAILY
Refills: 0 | Status: DISCONTINUED | OUTPATIENT
Start: 2022-02-28 | End: 2022-03-07

## 2022-02-28 RX ORDER — ALLOPURINOL 300 MG
100 TABLET ORAL DAILY
Refills: 0 | Status: DISCONTINUED | OUTPATIENT
Start: 2022-02-28 | End: 2022-03-03

## 2022-02-28 RX ORDER — OXYCODONE HYDROCHLORIDE 5 MG/1
30 TABLET ORAL EVERY 6 HOURS
Refills: 0 | Status: DISCONTINUED | OUTPATIENT
Start: 2022-02-28 | End: 2022-02-28

## 2022-02-28 RX ORDER — FOLIC ACID 0.8 MG
1 TABLET ORAL DAILY
Refills: 0 | Status: DISCONTINUED | OUTPATIENT
Start: 2022-02-28 | End: 2022-03-07

## 2022-02-28 RX ORDER — HYDROMORPHONE HYDROCHLORIDE 2 MG/ML
2 INJECTION INTRAMUSCULAR; INTRAVENOUS; SUBCUTANEOUS EVERY 4 HOURS
Refills: 0 | Status: DISCONTINUED | OUTPATIENT
Start: 2022-02-28 | End: 2022-02-28

## 2022-02-28 RX ORDER — HYDROMORPHONE HYDROCHLORIDE 2 MG/ML
3 INJECTION INTRAMUSCULAR; INTRAVENOUS; SUBCUTANEOUS
Refills: 0 | Status: DISCONTINUED | OUTPATIENT
Start: 2022-02-28 | End: 2022-03-06

## 2022-02-28 RX ORDER — LANOLIN ALCOHOL/MO/W.PET/CERES
3 CREAM (GRAM) TOPICAL AT BEDTIME
Refills: 0 | Status: DISCONTINUED | OUTPATIENT
Start: 2022-02-28 | End: 2022-03-07

## 2022-02-28 RX ORDER — OXYCODONE HYDROCHLORIDE 5 MG/1
30 TABLET ORAL EVERY 4 HOURS
Refills: 0 | Status: DISCONTINUED | OUTPATIENT
Start: 2022-02-28 | End: 2022-03-07

## 2022-02-28 RX ORDER — DIPHENHYDRAMINE HCL 50 MG
25 CAPSULE ORAL ONCE
Refills: 0 | Status: COMPLETED | OUTPATIENT
Start: 2022-02-28 | End: 2022-02-28

## 2022-02-28 RX ORDER — ONDANSETRON 8 MG/1
4 TABLET, FILM COATED ORAL ONCE
Refills: 0 | Status: COMPLETED | OUTPATIENT
Start: 2022-02-28 | End: 2022-02-28

## 2022-02-28 RX ADMIN — ONDANSETRON 4 MILLIGRAM(S): 8 TABLET, FILM COATED ORAL at 18:11

## 2022-02-28 RX ADMIN — HYDROMORPHONE HYDROCHLORIDE 2 MILLIGRAM(S): 2 INJECTION INTRAMUSCULAR; INTRAVENOUS; SUBCUTANEOUS at 10:36

## 2022-02-28 RX ADMIN — HYDROMORPHONE HYDROCHLORIDE 3 MILLIGRAM(S): 2 INJECTION INTRAMUSCULAR; INTRAVENOUS; SUBCUTANEOUS at 15:19

## 2022-02-28 RX ADMIN — HYDROMORPHONE HYDROCHLORIDE 2 MILLIGRAM(S): 2 INJECTION INTRAMUSCULAR; INTRAVENOUS; SUBCUTANEOUS at 08:10

## 2022-02-28 RX ADMIN — SODIUM CHLORIDE 1000 MILLILITER(S): 9 INJECTION INTRAMUSCULAR; INTRAVENOUS; SUBCUTANEOUS at 08:09

## 2022-02-28 RX ADMIN — ENOXAPARIN SODIUM 40 MILLIGRAM(S): 100 INJECTION SUBCUTANEOUS at 12:05

## 2022-02-28 RX ADMIN — HYDROMORPHONE HYDROCHLORIDE 2 MILLIGRAM(S): 2 INJECTION INTRAMUSCULAR; INTRAVENOUS; SUBCUTANEOUS at 10:56

## 2022-02-28 RX ADMIN — HYDROMORPHONE HYDROCHLORIDE 3 MILLIGRAM(S): 2 INJECTION INTRAMUSCULAR; INTRAVENOUS; SUBCUTANEOUS at 21:15

## 2022-02-28 RX ADMIN — ONDANSETRON 4 MILLIGRAM(S): 8 TABLET, FILM COATED ORAL at 08:10

## 2022-02-28 RX ADMIN — SODIUM CHLORIDE 125 MILLILITER(S): 9 INJECTION, SOLUTION INTRAVENOUS at 20:25

## 2022-02-28 RX ADMIN — Medication 25 MILLIGRAM(S): at 08:13

## 2022-02-28 RX ADMIN — HYDROMORPHONE HYDROCHLORIDE 3 MILLIGRAM(S): 2 INJECTION INTRAMUSCULAR; INTRAVENOUS; SUBCUTANEOUS at 18:37

## 2022-02-28 RX ADMIN — HYDROMORPHONE HYDROCHLORIDE 2 MILLIGRAM(S): 2 INJECTION INTRAMUSCULAR; INTRAVENOUS; SUBCUTANEOUS at 13:55

## 2022-02-28 RX ADMIN — Medication 0.6 MILLIGRAM(S): at 12:05

## 2022-02-28 RX ADMIN — Medication 100 MILLIGRAM(S): at 12:05

## 2022-02-28 RX ADMIN — SODIUM CHLORIDE 100 MILLILITER(S): 9 INJECTION INTRAMUSCULAR; INTRAVENOUS; SUBCUTANEOUS at 12:02

## 2022-02-28 RX ADMIN — ONDANSETRON 4 MILLIGRAM(S): 8 TABLET, FILM COATED ORAL at 12:06

## 2022-02-28 RX ADMIN — HYDROMORPHONE HYDROCHLORIDE 3 MILLIGRAM(S): 2 INJECTION INTRAMUSCULAR; INTRAVENOUS; SUBCUTANEOUS at 21:45

## 2022-02-28 RX ADMIN — HYDROMORPHONE HYDROCHLORIDE 3 MILLIGRAM(S): 2 INJECTION INTRAMUSCULAR; INTRAVENOUS; SUBCUTANEOUS at 14:49

## 2022-02-28 RX ADMIN — Medication 1 MILLIGRAM(S): at 12:05

## 2022-02-28 RX ADMIN — PANTOPRAZOLE SODIUM 40 MILLIGRAM(S): 20 TABLET, DELAYED RELEASE ORAL at 14:05

## 2022-02-28 RX ADMIN — HYDROMORPHONE HYDROCHLORIDE 3 MILLIGRAM(S): 2 INJECTION INTRAMUSCULAR; INTRAVENOUS; SUBCUTANEOUS at 17:54

## 2022-02-28 NOTE — H&P ADULT - ASSESSMENT
Patient is a 40 y/o male, from home, with significant medical history of Sickle Cell Disease (follows with Dr. Jordan OP), and Gout who presents with abdominal pain, nausea, and vomiting. Admitted for sickle cell crisis

## 2022-02-28 NOTE — ED ADULT NURSE NOTE - ED STAT RN HANDOFF DETAILS 2
axox3 ,nad , admitted to red floor , stable v/s , medicated as ordered , endorsed to next nurse in holding area.

## 2022-02-28 NOTE — ED PROVIDER NOTE - PROGRESS NOTE DETAILS
pt reassessed, still with pain and not tolerating PO.  h/H at baseline.  Will admit for sickle cell crisis .

## 2022-02-28 NOTE — H&P ADULT - PROBLEM SELECTOR PLAN 1
- Patient PW abdominal , joint pain and headache   - Hb 6.4 , reticulocytes 11 ( baseline )   - elevated bilirubin and Ast ( baseline )  - IV fluids @ 100 cc   - pain control tylenol, oxycodone, dilaudid for mild , moderate and severe pain. - Patient PW abdominal , joint pain and headache   - Hb 6.4 , reticulocytes 11 ( baseline )   - elevated bilirubin and Ast ( baseline )  - IV fluids ( 0.45 % NS ) @ 125 cc   - pain control tylenol, oxycodone, dilaudid for mild , moderate and severe pain.  - oxygen supplementation.

## 2022-02-28 NOTE — ED PROVIDER NOTE - OBJECTIVE STATEMENT
40 y/o male with PMHx of sickle cell disease and gout and PShx of cholecystectomy presents to ED with abdominal pain, nausea, and vomiting.  Pt admitted to [facility] 2/24-2/26 for sickle cell crisis.  pt baseline hemoglobin 5.5.  pt found to be anemic to 4.4 and was transfused.  Pt was subsequently discharged.  Yesterday, the patient was home and it was his sisters birthday.  He had pasta and cake and nausea and vomiting began after.  pt denies diarrhea, fever, or urinary complaints.  pt says this abdominal pain did trigger a sickle cell crisis, and the patient is now experiencing back and joint pain.

## 2022-02-28 NOTE — ED PROVIDER NOTE - CLINICAL SUMMARY MEDICAL DECISION MAKING FREE TEXT BOX
pt with history of sickle cell disease and surgical history of cholecystectomy presents with abdominal pain, nausea, and vomiting after eating yesterday.  + epigastric discomfort on exam.  Abdomianl pain seems to have exacerbated a sickle cell crisis.  Will check labs, GI cocktail, pain control and reassess.

## 2022-02-28 NOTE — H&P ADULT - HISTORY OF PRESENT ILLNESS
Patient is a 42 y/o male, from home, with significant medical history of Sickle Cell Disease (follows with Dr. Julian MITCHELL), and Gout who presents with abdominal pain, nausea, and vomiting. He is a longstanding SCD patient with multiple crises and RBCs transfusion received since the last 6 months,Patient has a baseline hemoglobin 5.5g/dl.   Pt was  admitted to [facility] 2/24-2/26 for sickle cell crisis when he was found to be anemic to 4.4 and was transfused.  Pt was subsequently discharged.  Yesterday, the patient was home and it was his sisters birthday.  He ate pasta and cake then abdominal pain,  nausea and vomiting began after.  He also reports headache and joint pain .   patient denies any dizziness,  palpitations, shortness of breath,  urinary symptoms or any other acute complaint.   Patient is a 40 y/o male, from home, with significant medical history of Sickle Cell Disease (follows with Dr. Julian MITCHELL), and Gout who presents with abdominal pain, nausea, and vomiting. He is a longstanding SCD patient with multiple crises and RBCs transfusion received since the last 6 months,Patient has a baseline hemoglobin 5.5g/dl.   Pt was  admitted to [facility] 2/24-2/26 for sickle cell crisis when he was found to be anemic to 4.4 and was transfused.  Pt was subsequently discharged.  Yesterday, the patient was home and it was his sisters birthday.  He ate pasta and cake then had severe abdominal pain,  nausea and vomiting began after.  He also reports headache and joint pain .   patient denies any dizziness,  palpitations, shortness of breath,  urinary symptoms or any other acute complaint.

## 2022-02-28 NOTE — H&P ADULT - PROBLEM SELECTOR PLAN 4
RISK                                                          Points  [] Previous VTE                                           3  [] Thrombophilia                                        2  [] Lower limb paralysis                              2   [] Current Cancer                                       2   [] Immobilization > 24 hrs                        1  [] ICU/CCU stay > 24 hours                       1  [] Age > 60                                                   1  lovenox

## 2022-02-28 NOTE — PATIENT PROFILE ADULT - FALL HARM RISK - HARM RISK INTERVENTIONS

## 2022-02-28 NOTE — H&P ADULT - NSHPADDITIONALINFOADULT_GEN_ALL_CORE
Old chart records reviewed:  Patient baseline Hgb around 6. Patient has persistently elevated Billirubin between 5-8 with AST elevated to 100.

## 2022-02-28 NOTE — H&P ADULT - PROBLEM SELECTOR PLAN 3
RISK                                                          Points  [] Previous VTE                                           3  [] Thrombophilia                                        2  [] Lower limb paralysis                              2   [] Current Cancer                                       2   [] Immobilization > 24 hrs                        1  [] ICU/CCU stay > 24 hours                       1  [] Age > 60                                                   1    SCD in the setting of anemia - Patient has history of Gout   - Resumed home meds

## 2022-02-28 NOTE — H&P ADULT - NSHPPHYSICALEXAM_GEN_ALL_CORE
ICU Vital Signs Last 24 Hrs  T(C): 37.3 (28 Feb 2022 10:53), Max: 37.3 (28 Feb 2022 10:53)  T(F): 99.2 (28 Feb 2022 10:53), Max: 99.2 (28 Feb 2022 10:53)  HR: 100 (28 Feb 2022 10:53) (91 - 112)  BP: 145/74 (28 Feb 2022 10:53) (143/82 - 145/84)  BP(mean): --  ABP: --  ABP(mean): --  RR: 20 (28 Feb 2022 10:53) (20 - 22)  SpO2: 95% (28 Feb 2022 10:53) (90% - 95%)  GENERAL: in pain , icteric  HEAD:  Atraumatic, Normocephalic  EYES: EOMI, PERRLA, conjunctiva and sclera icteric  NECK: Supple, No JVD  CHEST/LUNG: Clear to auscultation bilaterally; No wheeze  HEART: Regular rate and rhythm; No murmurs, rubs, or gallops  ABDOMEN: Soft, generalized tenderness, Nondistended; Bowel sounds present  EXTREMITIES:  2+ Peripheral Pulses, No clubbing, cyanosis, or edema  PSYCH: AAOx3  NEUROLOGY: non-focal  SKIN: jaundiced

## 2022-02-28 NOTE — H&P ADULT - PROBLEM SELECTOR PLAN 2
- Patient has history of Gout   - Resumed home meds - Patient PW abdominal pain , N , V   - lipase normal , tenderness mostly in epigastric area , likely gastritis   - will keep NPO for now an start on IV pantoprazole.

## 2022-02-28 NOTE — H&P ADULT - ATTENDING COMMENTS
42yo M PMHx of Sickle Cell Disease, Gout who presented with abdominal pain and nausea. Patient recently discharged from the hospital for sickle cell crisis in the setting of anemia. The patient reports going to a birthday party over the weekend and afterwards developed severe abdominal pain with nausea and vomiting. Denies sick contacts. Patient reports inability to tolerate any PO intake. Afterwards he began to develop pain in his joints bilaterally. In the ED, Hgb 6.4 at baseline, Lipase wnl, T Bili 8.4 chronically elevated. Patient has epigastric pain    #Sickle Cell Crisis  #Gastritis with intractable nausea, vomiting  #Gout    - Pain control, Dilaudid 3mg q3h PRN for severe pain, resume home oxycodone for moderate pain  - Aggressive IVF hydration  - NPO for now, advance diet as tolerated  - Pantoprazole IV  - if abdominal pain does not improve, or has worsening LFTs may consider further imaging  - no Chest pain or shortness of breath, unlikely acute chest syndrome

## 2022-03-01 LAB
ALBUMIN SERPL ELPH-MCNC: 3.1 G/DL — LOW (ref 3.5–5)
ALP SERPL-CCNC: 121 U/L — HIGH (ref 40–120)
ALT FLD-CCNC: 51 U/L DA — SIGNIFICANT CHANGE UP (ref 10–60)
ANION GAP SERPL CALC-SCNC: 7 MMOL/L — SIGNIFICANT CHANGE UP (ref 5–17)
APTT BLD: 29.1 SEC — SIGNIFICANT CHANGE UP (ref 27.5–35.5)
AST SERPL-CCNC: 122 U/L — HIGH (ref 10–40)
BASOPHILS # BLD AUTO: 0.05 K/UL — SIGNIFICANT CHANGE UP (ref 0–0.2)
BASOPHILS NFR BLD AUTO: 0.8 % — SIGNIFICANT CHANGE UP (ref 0–2)
BILIRUB SERPL-MCNC: 11.1 MG/DL — HIGH (ref 0.2–1.2)
BLD GP AB SCN SERPL QL: SIGNIFICANT CHANGE UP
BUN SERPL-MCNC: 20 MG/DL — HIGH (ref 7–18)
CALCIUM SERPL-MCNC: 8.7 MG/DL — SIGNIFICANT CHANGE UP (ref 8.4–10.5)
CHLORIDE SERPL-SCNC: 110 MMOL/L — HIGH (ref 96–108)
CO2 SERPL-SCNC: 22 MMOL/L — SIGNIFICANT CHANGE UP (ref 22–31)
CREAT SERPL-MCNC: 0.86 MG/DL — SIGNIFICANT CHANGE UP (ref 0.5–1.3)
EGFR: 111 ML/MIN/1.73M2 — SIGNIFICANT CHANGE UP
EOSINOPHIL # BLD AUTO: 0.39 K/UL — SIGNIFICANT CHANGE UP (ref 0–0.5)
EOSINOPHIL NFR BLD AUTO: 6.6 % — HIGH (ref 0–6)
GLUCOSE SERPL-MCNC: 77 MG/DL — SIGNIFICANT CHANGE UP (ref 70–99)
HCT VFR BLD CALC: 15.7 % — CRITICAL LOW (ref 39–50)
HGB BLD-MCNC: 5.6 G/DL — CRITICAL LOW (ref 13–17)
IMM GRANULOCYTES NFR BLD AUTO: 0.3 % — SIGNIFICANT CHANGE UP (ref 0–1.5)
INR BLD: 1.27 RATIO — HIGH (ref 0.88–1.16)
LYMPHOCYTES # BLD AUTO: 2.35 K/UL — SIGNIFICANT CHANGE UP (ref 1–3.3)
LYMPHOCYTES # BLD AUTO: 39.6 % — SIGNIFICANT CHANGE UP (ref 13–44)
MAGNESIUM SERPL-MCNC: 1.6 MG/DL — SIGNIFICANT CHANGE UP (ref 1.6–2.6)
MCHC RBC-ENTMCNC: 31.5 PG — SIGNIFICANT CHANGE UP (ref 27–34)
MCHC RBC-ENTMCNC: 35.7 GM/DL — SIGNIFICANT CHANGE UP (ref 32–36)
MCV RBC AUTO: 88.2 FL — SIGNIFICANT CHANGE UP (ref 80–100)
MONOCYTES # BLD AUTO: 1.31 K/UL — HIGH (ref 0–0.9)
MONOCYTES NFR BLD AUTO: 22.1 % — HIGH (ref 2–14)
NEUTROPHILS # BLD AUTO: 1.82 K/UL — SIGNIFICANT CHANGE UP (ref 1.8–7.4)
NEUTROPHILS NFR BLD AUTO: 30.6 % — LOW (ref 43–77)
NRBC # BLD: 0 /100 WBCS — SIGNIFICANT CHANGE UP (ref 0–0)
PHOSPHATE SERPL-MCNC: 2.6 MG/DL — SIGNIFICANT CHANGE UP (ref 2.5–4.5)
PLATELET # BLD AUTO: 211 K/UL — SIGNIFICANT CHANGE UP (ref 150–400)
POTASSIUM SERPL-MCNC: 3.8 MMOL/L — SIGNIFICANT CHANGE UP (ref 3.5–5.3)
POTASSIUM SERPL-SCNC: 3.8 MMOL/L — SIGNIFICANT CHANGE UP (ref 3.5–5.3)
PROT SERPL-MCNC: 6.8 G/DL — SIGNIFICANT CHANGE UP (ref 6–8.3)
PROTHROM AB SERPL-ACNC: 15.1 SEC — HIGH (ref 10.5–13.4)
RBC # BLD: 1.78 M/UL — LOW (ref 4.2–5.8)
RBC # FLD: 20.5 % — HIGH (ref 10.3–14.5)
SODIUM SERPL-SCNC: 139 MMOL/L — SIGNIFICANT CHANGE UP (ref 135–145)
WBC # BLD: 5.94 K/UL — SIGNIFICANT CHANGE UP (ref 3.8–10.5)
WBC # FLD AUTO: 5.94 K/UL — SIGNIFICANT CHANGE UP (ref 3.8–10.5)

## 2022-03-01 PROCEDURE — 99233 SBSQ HOSP IP/OBS HIGH 50: CPT

## 2022-03-01 RX ORDER — DIPHENHYDRAMINE HCL 50 MG
25 CAPSULE ORAL ONCE
Refills: 0 | Status: COMPLETED | OUTPATIENT
Start: 2022-03-01 | End: 2022-03-01

## 2022-03-01 RX ORDER — PANTOPRAZOLE SODIUM 20 MG/1
40 TABLET, DELAYED RELEASE ORAL
Refills: 0 | Status: DISCONTINUED | OUTPATIENT
Start: 2022-03-01 | End: 2022-03-07

## 2022-03-01 RX ORDER — ONDANSETRON 8 MG/1
4 TABLET, FILM COATED ORAL ONCE
Refills: 0 | Status: COMPLETED | OUTPATIENT
Start: 2022-03-01 | End: 2022-03-01

## 2022-03-01 RX ORDER — CHLORHEXIDINE GLUCONATE 213 G/1000ML
1 SOLUTION TOPICAL DAILY
Refills: 0 | Status: DISCONTINUED | OUTPATIENT
Start: 2022-03-01 | End: 2022-03-07

## 2022-03-01 RX ADMIN — Medication 25 MILLIGRAM(S): at 18:31

## 2022-03-01 RX ADMIN — HYDROMORPHONE HYDROCHLORIDE 3 MILLIGRAM(S): 2 INJECTION INTRAMUSCULAR; INTRAVENOUS; SUBCUTANEOUS at 00:54

## 2022-03-01 RX ADMIN — HYDROMORPHONE HYDROCHLORIDE 3 MILLIGRAM(S): 2 INJECTION INTRAMUSCULAR; INTRAVENOUS; SUBCUTANEOUS at 00:24

## 2022-03-01 RX ADMIN — ENOXAPARIN SODIUM 40 MILLIGRAM(S): 100 INJECTION SUBCUTANEOUS at 12:12

## 2022-03-01 RX ADMIN — ONDANSETRON 4 MILLIGRAM(S): 8 TABLET, FILM COATED ORAL at 22:10

## 2022-03-01 RX ADMIN — Medication 650 MILLIGRAM(S): at 09:40

## 2022-03-01 RX ADMIN — Medication 1 MILLIGRAM(S): at 12:12

## 2022-03-01 RX ADMIN — HYDROMORPHONE HYDROCHLORIDE 3 MILLIGRAM(S): 2 INJECTION INTRAMUSCULAR; INTRAVENOUS; SUBCUTANEOUS at 10:15

## 2022-03-01 RX ADMIN — HYDROMORPHONE HYDROCHLORIDE 3 MILLIGRAM(S): 2 INJECTION INTRAMUSCULAR; INTRAVENOUS; SUBCUTANEOUS at 19:43

## 2022-03-01 RX ADMIN — Medication 100 MILLIGRAM(S): at 12:12

## 2022-03-01 RX ADMIN — ONDANSETRON 4 MILLIGRAM(S): 8 TABLET, FILM COATED ORAL at 00:23

## 2022-03-01 RX ADMIN — HYDROMORPHONE HYDROCHLORIDE 3 MILLIGRAM(S): 2 INJECTION INTRAMUSCULAR; INTRAVENOUS; SUBCUTANEOUS at 16:45

## 2022-03-01 RX ADMIN — CHLORHEXIDINE GLUCONATE 1 APPLICATION(S): 213 SOLUTION TOPICAL at 12:12

## 2022-03-01 RX ADMIN — HYDROMORPHONE HYDROCHLORIDE 3 MILLIGRAM(S): 2 INJECTION INTRAMUSCULAR; INTRAVENOUS; SUBCUTANEOUS at 07:02

## 2022-03-01 RX ADMIN — HYDROMORPHONE HYDROCHLORIDE 3 MILLIGRAM(S): 2 INJECTION INTRAMUSCULAR; INTRAVENOUS; SUBCUTANEOUS at 22:18

## 2022-03-01 RX ADMIN — HYDROMORPHONE HYDROCHLORIDE 3 MILLIGRAM(S): 2 INJECTION INTRAMUSCULAR; INTRAVENOUS; SUBCUTANEOUS at 04:00

## 2022-03-01 RX ADMIN — HYDROMORPHONE HYDROCHLORIDE 3 MILLIGRAM(S): 2 INJECTION INTRAMUSCULAR; INTRAVENOUS; SUBCUTANEOUS at 12:44

## 2022-03-01 RX ADMIN — HYDROMORPHONE HYDROCHLORIDE 3 MILLIGRAM(S): 2 INJECTION INTRAMUSCULAR; INTRAVENOUS; SUBCUTANEOUS at 03:30

## 2022-03-01 RX ADMIN — HYDROMORPHONE HYDROCHLORIDE 3 MILLIGRAM(S): 2 INJECTION INTRAMUSCULAR; INTRAVENOUS; SUBCUTANEOUS at 06:32

## 2022-03-01 RX ADMIN — SODIUM CHLORIDE 125 MILLILITER(S): 9 INJECTION, SOLUTION INTRAVENOUS at 12:13

## 2022-03-01 RX ADMIN — HYDROMORPHONE HYDROCHLORIDE 3 MILLIGRAM(S): 2 INJECTION INTRAMUSCULAR; INTRAVENOUS; SUBCUTANEOUS at 09:41

## 2022-03-01 RX ADMIN — ONDANSETRON 4 MILLIGRAM(S): 8 TABLET, FILM COATED ORAL at 12:45

## 2022-03-01 RX ADMIN — Medication 650 MILLIGRAM(S): at 08:42

## 2022-03-01 RX ADMIN — HYDROMORPHONE HYDROCHLORIDE 3 MILLIGRAM(S): 2 INJECTION INTRAMUSCULAR; INTRAVENOUS; SUBCUTANEOUS at 19:13

## 2022-03-01 RX ADMIN — Medication 0.6 MILLIGRAM(S): at 12:12

## 2022-03-01 RX ADMIN — ONDANSETRON 4 MILLIGRAM(S): 8 TABLET, FILM COATED ORAL at 18:31

## 2022-03-01 RX ADMIN — HYDROMORPHONE HYDROCHLORIDE 3 MILLIGRAM(S): 2 INJECTION INTRAMUSCULAR; INTRAVENOUS; SUBCUTANEOUS at 13:15

## 2022-03-01 RX ADMIN — SODIUM CHLORIDE 125 MILLILITER(S): 9 INJECTION, SOLUTION INTRAVENOUS at 03:30

## 2022-03-01 RX ADMIN — PANTOPRAZOLE SODIUM 40 MILLIGRAM(S): 20 TABLET, DELAYED RELEASE ORAL at 12:12

## 2022-03-01 RX ADMIN — HYDROMORPHONE HYDROCHLORIDE 3 MILLIGRAM(S): 2 INJECTION INTRAMUSCULAR; INTRAVENOUS; SUBCUTANEOUS at 16:12

## 2022-03-01 RX ADMIN — ONDANSETRON 4 MILLIGRAM(S): 8 TABLET, FILM COATED ORAL at 06:34

## 2022-03-01 RX ADMIN — HYDROMORPHONE HYDROCHLORIDE 3 MILLIGRAM(S): 2 INJECTION INTRAMUSCULAR; INTRAVENOUS; SUBCUTANEOUS at 22:48

## 2022-03-01 NOTE — PROGRESS NOTE ADULT - ASSESSMENT
Patient is a 40 y/o male, from home, with significant medical history of Sickle Cell Disease (follows with Dr. Jordan OP), and Gout who presents with abdominal pain, nausea, and vomiting. He is a longstanding SCD patient with multiple crises and RBCs transfusion received since the last 6 months, Patient has a baseline hemoglobin 5.5g/dl.  Patient admitted to medicine for Sickle cell crisis with lower back pain and was started on pain management with IV Dilaudid, Oxycodone and IVF. Patient has right chest Mediport accessed in ED for medication and IVF and is following with dr. Jordan as outpatient. Patient also found to be anemic with Hgb 5.6 and was ordered 1U PRBC.

## 2022-03-01 NOTE — PROGRESS NOTE ADULT - SUBJECTIVE AND OBJECTIVE BOX
NP Note discussed with Primary Attending.    Patient is a 41y old  Male who presents with a chief complaint of Sickle Cell Crisis (2022 11:24)      INTERVAL HPI/OVERNIGHT EVENTS: no new complaints    MEDICATIONS  (STANDING):  allopurinol 100 milliGRAM(s) Oral daily  chlorhexidine 2% Cloths 1 Application(s) Topical daily  colchicine 0.6 milliGRAM(s) Oral daily  diphenhydrAMINE Injectable 25 milliGRAM(s) IV Push once  enoxaparin Injectable 40 milliGRAM(s) SubCutaneous daily  folic acid 1 milliGRAM(s) Oral daily  ondansetron Injectable 4 milliGRAM(s) IV Push once  pantoprazole  Injectable 40 milliGRAM(s) IV Push daily  sodium chloride 0.45%. 1000 milliLiter(s) (125 mL/Hr) IV Continuous <Continuous>    MEDICATIONS  (PRN):  acetaminophen     Tablet .. 650 milliGRAM(s) Oral every 6 hours PRN Temp greater or equal to 38C (100.4F), Mild Pain (1 - 3)  aluminum hydroxide/magnesium hydroxide/simethicone Suspension 30 milliLiter(s) Oral every 4 hours PRN Dyspepsia  HYDROmorphone  Injectable 3 milliGRAM(s) IV Push every 3 hours PRN Severe Pain (7 - 10)  melatonin 3 milliGRAM(s) Oral at bedtime PRN Insomnia  ondansetron Injectable 4 milliGRAM(s) IV Push every 6 hours PRN Nausea and/or Vomiting  oxyCODONE    IR 30 milliGRAM(s) Oral every 4 hours PRN Moderate Pain (4 - 6)      __________________________________________________  REVIEW OF SYSTEMS:    CONSTITUTIONAL: No fever,   EYES: no acute visual disturbances  NECK: No pain or stiffness  RESPIRATORY: No cough; No shortness of breath  CARDIOVASCULAR: No chest pain, no palpitations  GASTROINTESTINAL: No pain. No nausea or vomiting; No diarrhea   NEUROLOGICAL: No headache or numbness, no tremors  MUSCULOSKELETAL: No joint pain, no muscle pain  GENITOURINARY: no dysuria, no frequency, no hesitancy  PSYCHIATRY: no depression , no anxiety  ALL OTHER  ROS negative        Vital Signs Last 24 Hrs  T(C): 36.6 (01 Mar 2022 12:26), Max: 37.1 (01 Mar 2022 00:19)  T(F): 97.8 (01 Mar 2022 12:26), Max: 98.8 (01 Mar 2022 00:19)  HR: 76 (01 Mar 2022 12:26) (76 - 95)  BP: 131/75 (01 Mar 2022 12:26) (130/63 - 145/88)  BP(mean): 79 (01 Mar 2022 03:28) (79 - 79)  RR: 18 (01 Mar 2022 12:26) (18 - 18)  SpO2: 96% (01 Mar 2022 12:) (93% - 96%)    ________________________________________________  PHYSICAL EXAM:  GENERAL: NAD  HEENT: Normocephalic;  conjunctivae and sclerae clear; moist mucous membranes;   NECK : supple  CHEST/LUNG: Clear to auscultation bilaterally with good air entry   HEART: S1 S2  regular; no murmurs, gallops or rubs  ABDOMEN: Soft, Nontender, Nondistended; Bowel sounds present  EXTREMITIES: no cyanosis; no edema; no calf tenderness  SKIN: warm and dry; no rash  NERVOUS SYSTEM:  Awake and alert; Oriented  to place, person and time ; no new deficits    _________________________________________________  LABS:                        5.6    5.94  )-----------( 211      ( 01 Mar 2022 11:14 )             15.7     03-01    139  |  110<H>  |  20<H>  ----------------------------<  77  3.8   |  22  |  0.86    Ca    8.7      01 Mar 2022 11:14  Phos  2.6     03-01  Mg     1.6     03-01    TPro  6.8  /  Alb  3.1<L>  /  TBili  11.1<H>  /  DBili  x   /  AST  122<H>  /  ALT  51  /  AlkPhos  121<H>  03-    PT/INR - ( 01 Mar 2022 11:14 )   PT: 15.1 sec;   INR: 1.27 ratio         PTT - ( 01 Mar 2022 11:14 )  PTT:29.1 sec  Urinalysis Basic - ( 2022 08:55 )    Color: Yellow / Appearance: Clear / S.010 / pH: x  Gluc: x / Ketone: Negative  / Bili: Negative / Urobili: 1   Blood: x / Protein: 100 / Nitrite: Negative   Leuk Esterase: Negative / RBC: 2-5 /HPF / WBC 0-2 /HPF   Sq Epi: x / Non Sq Epi: x / Bacteria: Moderate /HPF      CAPILLARY BLOOD GLUCOSE            RADIOLOGY & ADDITIONAL TESTS:    Imaging  Reviewed:  YES/NO    Consultant(s) Notes Reviewed:   YES/ No      Plan of care was discussed with patient and /or primary care giver; all questions and concerns were addressed

## 2022-03-02 DIAGNOSIS — R74.8 ABNORMAL LEVELS OF OTHER SERUM ENZYMES: ICD-10-CM

## 2022-03-02 LAB
ALBUMIN SERPL ELPH-MCNC: 2.9 G/DL — LOW (ref 3.5–5)
ALP SERPL-CCNC: 121 U/L — HIGH (ref 40–120)
ALT FLD-CCNC: 71 U/L DA — HIGH (ref 10–60)
ANION GAP SERPL CALC-SCNC: 6 MMOL/L — SIGNIFICANT CHANGE UP (ref 5–17)
AST SERPL-CCNC: 163 U/L — HIGH (ref 10–40)
BILIRUB DIRECT SERPL-MCNC: 6.9 MG/DL — HIGH (ref 0–0.3)
BILIRUB SERPL-MCNC: 13 MG/DL — HIGH (ref 0.2–1.2)
BUN SERPL-MCNC: 20 MG/DL — HIGH (ref 7–18)
CALCIUM SERPL-MCNC: 9 MG/DL — SIGNIFICANT CHANGE UP (ref 8.4–10.5)
CHLORIDE SERPL-SCNC: 113 MMOL/L — HIGH (ref 96–108)
CO2 SERPL-SCNC: 23 MMOL/L — SIGNIFICANT CHANGE UP (ref 22–31)
CREAT SERPL-MCNC: 0.78 MG/DL — SIGNIFICANT CHANGE UP (ref 0.5–1.3)
DIR ANTIGLOB POLYSPECIFIC INTERPRETATION: SIGNIFICANT CHANGE UP
EGFR: 114 ML/MIN/1.73M2 — SIGNIFICANT CHANGE UP
GGT SERPL-CCNC: 130 U/L — HIGH (ref 9–50)
GLUCOSE SERPL-MCNC: 90 MG/DL — SIGNIFICANT CHANGE UP (ref 70–99)
HCT VFR BLD CALC: 17.8 % — CRITICAL LOW (ref 39–50)
HGB BLD-MCNC: 6.4 G/DL — CRITICAL LOW (ref 13–17)
MCHC RBC-ENTMCNC: 32 PG — SIGNIFICANT CHANGE UP (ref 27–34)
MCHC RBC-ENTMCNC: 36 GM/DL — SIGNIFICANT CHANGE UP (ref 32–36)
MCV RBC AUTO: 89 FL — SIGNIFICANT CHANGE UP (ref 80–100)
NRBC # BLD: 0 /100 WBCS — SIGNIFICANT CHANGE UP (ref 0–0)
PLATELET # BLD AUTO: 200 K/UL — SIGNIFICANT CHANGE UP (ref 150–400)
POTASSIUM SERPL-MCNC: 3.8 MMOL/L — SIGNIFICANT CHANGE UP (ref 3.5–5.3)
POTASSIUM SERPL-SCNC: 3.8 MMOL/L — SIGNIFICANT CHANGE UP (ref 3.5–5.3)
PROT SERPL-MCNC: 6.6 G/DL — SIGNIFICANT CHANGE UP (ref 6–8.3)
RBC # BLD: 2 M/UL — LOW (ref 4.2–5.8)
RBC # FLD: 18.8 % — HIGH (ref 10.3–14.5)
SODIUM SERPL-SCNC: 142 MMOL/L — SIGNIFICANT CHANGE UP (ref 135–145)
WBC # BLD: 7.66 K/UL — SIGNIFICANT CHANGE UP (ref 3.8–10.5)
WBC # FLD AUTO: 7.66 K/UL — SIGNIFICANT CHANGE UP (ref 3.8–10.5)

## 2022-03-02 PROCEDURE — 99222 1ST HOSP IP/OBS MODERATE 55: CPT

## 2022-03-02 PROCEDURE — 99233 SBSQ HOSP IP/OBS HIGH 50: CPT

## 2022-03-02 PROCEDURE — 76700 US EXAM ABDOM COMPLETE: CPT | Mod: 26

## 2022-03-02 RX ORDER — MECLIZINE HCL 12.5 MG
25 TABLET ORAL ONCE
Refills: 0 | Status: COMPLETED | OUTPATIENT
Start: 2022-03-02 | End: 2022-03-02

## 2022-03-02 RX ORDER — SODIUM CHLORIDE 9 MG/ML
1000 INJECTION, SOLUTION INTRAVENOUS
Refills: 0 | Status: COMPLETED | OUTPATIENT
Start: 2022-03-02 | End: 2022-03-03

## 2022-03-02 RX ORDER — ONDANSETRON 8 MG/1
4 TABLET, FILM COATED ORAL ONCE
Refills: 0 | Status: COMPLETED | OUTPATIENT
Start: 2022-03-02 | End: 2022-03-02

## 2022-03-02 RX ADMIN — ENOXAPARIN SODIUM 40 MILLIGRAM(S): 100 INJECTION SUBCUTANEOUS at 12:00

## 2022-03-02 RX ADMIN — HYDROMORPHONE HYDROCHLORIDE 3 MILLIGRAM(S): 2 INJECTION INTRAMUSCULAR; INTRAVENOUS; SUBCUTANEOUS at 12:01

## 2022-03-02 RX ADMIN — HYDROMORPHONE HYDROCHLORIDE 3 MILLIGRAM(S): 2 INJECTION INTRAMUSCULAR; INTRAVENOUS; SUBCUTANEOUS at 01:31

## 2022-03-02 RX ADMIN — HYDROMORPHONE HYDROCHLORIDE 3 MILLIGRAM(S): 2 INJECTION INTRAMUSCULAR; INTRAVENOUS; SUBCUTANEOUS at 15:24

## 2022-03-02 RX ADMIN — HYDROMORPHONE HYDROCHLORIDE 3 MILLIGRAM(S): 2 INJECTION INTRAMUSCULAR; INTRAVENOUS; SUBCUTANEOUS at 22:56

## 2022-03-02 RX ADMIN — HYDROMORPHONE HYDROCHLORIDE 3 MILLIGRAM(S): 2 INJECTION INTRAMUSCULAR; INTRAVENOUS; SUBCUTANEOUS at 19:00

## 2022-03-02 RX ADMIN — HYDROMORPHONE HYDROCHLORIDE 3 MILLIGRAM(S): 2 INJECTION INTRAMUSCULAR; INTRAVENOUS; SUBCUTANEOUS at 08:52

## 2022-03-02 RX ADMIN — Medication 25 MILLIGRAM(S): at 18:03

## 2022-03-02 RX ADMIN — PANTOPRAZOLE SODIUM 40 MILLIGRAM(S): 20 TABLET, DELAYED RELEASE ORAL at 05:21

## 2022-03-02 RX ADMIN — CHLORHEXIDINE GLUCONATE 1 APPLICATION(S): 213 SOLUTION TOPICAL at 12:00

## 2022-03-02 RX ADMIN — HYDROMORPHONE HYDROCHLORIDE 3 MILLIGRAM(S): 2 INJECTION INTRAMUSCULAR; INTRAVENOUS; SUBCUTANEOUS at 09:30

## 2022-03-02 RX ADMIN — Medication 650 MILLIGRAM(S): at 15:27

## 2022-03-02 RX ADMIN — HYDROMORPHONE HYDROCHLORIDE 3 MILLIGRAM(S): 2 INJECTION INTRAMUSCULAR; INTRAVENOUS; SUBCUTANEOUS at 22:26

## 2022-03-02 RX ADMIN — ONDANSETRON 4 MILLIGRAM(S): 8 TABLET, FILM COATED ORAL at 17:20

## 2022-03-02 RX ADMIN — SODIUM CHLORIDE 90 MILLILITER(S): 9 INJECTION, SOLUTION INTRAVENOUS at 12:00

## 2022-03-02 RX ADMIN — HYDROMORPHONE HYDROCHLORIDE 3 MILLIGRAM(S): 2 INJECTION INTRAMUSCULAR; INTRAVENOUS; SUBCUTANEOUS at 12:32

## 2022-03-02 RX ADMIN — ONDANSETRON 4 MILLIGRAM(S): 8 TABLET, FILM COATED ORAL at 12:18

## 2022-03-02 RX ADMIN — HYDROMORPHONE HYDROCHLORIDE 3 MILLIGRAM(S): 2 INJECTION INTRAMUSCULAR; INTRAVENOUS; SUBCUTANEOUS at 18:32

## 2022-03-02 RX ADMIN — ONDANSETRON 4 MILLIGRAM(S): 8 TABLET, FILM COATED ORAL at 05:21

## 2022-03-02 RX ADMIN — HYDROMORPHONE HYDROCHLORIDE 3 MILLIGRAM(S): 2 INJECTION INTRAMUSCULAR; INTRAVENOUS; SUBCUTANEOUS at 05:51

## 2022-03-02 RX ADMIN — Medication 100 MILLIGRAM(S): at 11:59

## 2022-03-02 RX ADMIN — HYDROMORPHONE HYDROCHLORIDE 3 MILLIGRAM(S): 2 INJECTION INTRAMUSCULAR; INTRAVENOUS; SUBCUTANEOUS at 05:21

## 2022-03-02 RX ADMIN — HYDROMORPHONE HYDROCHLORIDE 3 MILLIGRAM(S): 2 INJECTION INTRAMUSCULAR; INTRAVENOUS; SUBCUTANEOUS at 02:01

## 2022-03-02 RX ADMIN — Medication 0.6 MILLIGRAM(S): at 11:59

## 2022-03-02 RX ADMIN — Medication 650 MILLIGRAM(S): at 16:00

## 2022-03-02 RX ADMIN — Medication 1 MILLIGRAM(S): at 11:59

## 2022-03-02 RX ADMIN — HYDROMORPHONE HYDROCHLORIDE 3 MILLIGRAM(S): 2 INJECTION INTRAMUSCULAR; INTRAVENOUS; SUBCUTANEOUS at 16:00

## 2022-03-02 NOTE — CONSULT NOTE ADULT - PROBLEM SELECTOR RECOMMENDATION 9
-Recommend the following labs be added: Haptoglobin, LDH, 24 hour urine hemosiderin, Abram, Hgb electrophoresis to assess patient's sickle cell and hemolysis  -Patient's Tbili 13 seems very high for only hemolysis to be taking place and there is suspicion for underlying liver dysfunction: recommend Hep A, B, C panel; please consult to Dr. Tristan for consideration of liver biopsy for patient in the setting of this patient's suspicious Tbili 13

## 2022-03-02 NOTE — CONSULT NOTE ADULT - SUBJECTIVE AND OBJECTIVE BOX
CHIEF COMPLAINT  Sickle Cell Crisis    HISTORY OF PRESENT ILLNESS  TOLU VARGAS is a 41y Male who presents with a chief complaint of sickle cell crisis.    Patient was admitted on February 28th after presenting with abdominal pain, nausea, and vomiting. He has history of sickle cell disease and follows with Dr. Shirley Jordan. He was admitted for pain control.    PAST MEDICAL AND SURGICAL HISTORY  Gout  Sickle Cell Disease    FAMILY HISTORY  Sickle Cell    SOCIAL HISTORY  Denies tobacco or alcohol use    REVIEW OF SYSTEMS  A complete review of systems was performed; negative except per HPI    PHYSICAL EXAM  T(C): 36.9 (03-02-22 @ 05:14), Max: 37.1 (03-01-22 @ 19:11)  HR: 75 (03-02-22 @ 05:14) (73 - 88)  BP: 142/77 (03-02-22 @ 05:14) (126/65 - 142/77)  RR: 16 (03-02-22 @ 05:14) (16 - 18)  SpO2: 97% (03-02-22 @ 05:14) (96% - 98%)  Constitutional: alert, awake, in no acute distress  Eyes: PERRL, EOMI  HEENT: normocephalic, atraumatic  Neck: supple, non-tender  Cardiovascular: normal perfusion, no peripheral edema  Respiratory: normal respiratory efforts; no increased use of accessory muscles  Gastrointestinal: soft, non-tender  Musculoskeletal: normal range of motion, no deformities noted  Neurological: alert, CN II to XI grossly intact  Skin: warm, dry    LABORATORY DATA                        6.4    7.66  )-----------( 200      ( 02 Mar 2022 07:16 )             17.8     03-02    142  |  113<H>  |  20<H>  ----------------------------<  90  3.8   |  23  |  0.78    Ca    9.0      02 Mar 2022 07:16  Phos  2.6     03-01  Mg     1.6     03-01    TPro  6.6  /  Alb  2.9<L>  /  TBili  13.0<H>  /  DBili  6.9<H>  /  AST  163<H>  /  ALT  71<H>  /  AlkPhos  121<H>  03-02

## 2022-03-02 NOTE — CONSULT NOTE ADULT - ASSESSMENT
TOLU VARGAS is a 41y Male who presents with a chief complaint of sickle cell crisis.    Sickle Cell Disease  - Patient with known sickle cell disease. Baseline hemoglobin of around 5-6.  - Hold further transfusions unless patient has symptomatic anemia given he is above his baseline at this time.   - Pending abdominal ultrasound.   - Continue pain control and IVF hydration.  - Advance diet as tolerated.  - Patient had been unable to tolerate hydroxyurea in the past. Continue folic acid supplementation.    Will continue to follow.  On discharge, to follow-up with Dr. Shirley Jordan.    Bala Pan MD  Hematology/Oncology  C: [phone]  O: [phone] TOLU VARGAS is a 41y Male who presents with a chief complaint of sickle cell crisis.    Sickle Cell Disease  - Patient with known sickle cell disease. Baseline hemoglobin of around 5-6.  - Hold further transfusions unless patient has symptomatic anemia given he is above his baseline at this time.   - Pending abdominal ultrasound.   - Continue pain control and IVF hydration.  - Advance diet as tolerated.  - Patient had been unable to tolerate hydroxyurea in the past. Continue folic acid supplementation.    Will continue to follow.  On discharge, to follow-up with Dr. Shirley Jordan.    Bala Pan MD  Hematology/Oncology  O: [phone]

## 2022-03-02 NOTE — PROGRESS NOTE ADULT - ASSESSMENT
Patient is a 42 y/o male, from home, with significant medical history of Sickle Cell Disease (follows with Dr. Jordan OP), and Gout who presents with abdominal pain, nausea, and vomiting. He is a longstanding SCD patient with multiple crises and RBCs transfusion received since the last 6 months, Patient has a baseline hemoglobin 5.5g/dl.  Patient admitted to medicine for Sickle cell crisis with lower back pain and was started on pain management with IV Dilaudid, Oxycodone and IVF. Patient has right chest Mediport accessed in ED for medication and IVF and is following with dr. Jordan as outpatient. Patient also found to be anemic with Hgb 5.6 and was ordered 1U PRBC. Abdominal US negative for acute findings, hem/onc consulted, patient is known to dr. Jordan, will hold further transfusion as pt baseline is around 6, continue with  pain management and IVF.

## 2022-03-02 NOTE — PROGRESS NOTE ADULT - SUBJECTIVE AND OBJECTIVE BOX
NP Note discussed with Primary Attending.    Patient is a 41y old  Male who presents with a chief complaint of Sickle Cell Crisis (02 Mar 2022 09:13)      INTERVAL HPI/OVERNIGHT EVENTS: no new complaints    MEDICATIONS  (STANDING):  allopurinol 100 milliGRAM(s) Oral daily  chlorhexidine 2% Cloths 1 Application(s) Topical daily  colchicine 0.6 milliGRAM(s) Oral daily  enoxaparin Injectable 40 milliGRAM(s) SubCutaneous daily  folic acid 1 milliGRAM(s) Oral daily  pantoprazole    Tablet 40 milliGRAM(s) Oral before breakfast    MEDICATIONS  (PRN):  acetaminophen     Tablet .. 650 milliGRAM(s) Oral every 6 hours PRN Temp greater or equal to 38C (100.4F), Mild Pain (1 - 3)  aluminum hydroxide/magnesium hydroxide/simethicone Suspension 30 milliLiter(s) Oral every 4 hours PRN Dyspepsia  HYDROmorphone  Injectable 3 milliGRAM(s) IV Push every 3 hours PRN Severe Pain (7 - 10)  melatonin 3 milliGRAM(s) Oral at bedtime PRN Insomnia  ondansetron Injectable 4 milliGRAM(s) IV Push every 6 hours PRN Nausea and/or Vomiting  oxyCODONE    IR 30 milliGRAM(s) Oral every 4 hours PRN Moderate Pain (4 - 6)      __________________________________________________  REVIEW OF SYSTEMS:    CONSTITUTIONAL: No fever,   EYES: no acute visual disturbances  NECK: No pain or stiffness  RESPIRATORY: No cough; No shortness of breath  CARDIOVASCULAR: No chest pain, no palpitations  GASTROINTESTINAL: No pain. No nausea or vomiting; No diarrhea   NEUROLOGICAL: No headache or numbness, no tremors  MUSCULOSKELETAL: No joint pain, no muscle pain  GENITOURINARY: no dysuria, no frequency, no hesitancy  PSYCHIATRY: no depression , no anxiety  ALL OTHER  ROS negative        Vital Signs Last 24 Hrs  T(C): 36.9 (02 Mar 2022 05:14), Max: 37.1 (01 Mar 2022 19:11)  T(F): 98.5 (02 Mar 2022 05:14), Max: 98.7 (01 Mar 2022 19:11)  HR: 75 (02 Mar 2022 05:14) (73 - 88)  BP: 142/77 (02 Mar 2022 05:14) (126/65 - 142/77)  BP(mean): 78 (02 Mar 2022 01:29) (78 - 88)  RR: 16 (02 Mar 2022 05:14) (16 - 18)  SpO2: 97% (02 Mar 2022 05:14) (96% - 98%)    ________________________________________________  PHYSICAL EXAM:  GENERAL: NAD  HEENT: Normocephalic;  conjunctivae and sclerae clear; moist mucous membranes;   NECK : supple  CHEST/LUNG: Clear to auscultation bilaterally with good air entry   HEART: S1 S2  regular; no murmurs, gallops or rubs  ABDOMEN: Soft, Nontender, Nondistended; Bowel sounds present  EXTREMITIES: no cyanosis; no edema; no calf tenderness  SKIN: warm and dry; no rash  NERVOUS SYSTEM:  Awake and alert; Oriented  to place, person and time ; no new deficits    _________________________________________________  LABS:                        6.4    7.66  )-----------( 200      ( 02 Mar 2022 07:16 )             17.8     03-02    142  |  113<H>  |  20<H>  ----------------------------<  90  3.8   |  23  |  0.78    Ca    9.0      02 Mar 2022 07:16  Phos  2.6     03-01  Mg     1.6     03-01    TPro  6.6  /  Alb  2.9<L>  /  TBili  13.0<H>  /  DBili  6.9<H>  /  AST  163<H>  /  ALT  71<H>  /  AlkPhos  121<H>  03-02    PT/INR - ( 01 Mar 2022 11:14 )   PT: 15.1 sec;   INR: 1.27 ratio         PTT - ( 01 Mar 2022 11:14 )  PTT:29.1 sec    CAPILLARY BLOOD GLUCOSE            RADIOLOGY & ADDITIONAL TESTS:  ACC: [id number] EXAM:  US ABDOMEN COMPLETE                          PROCEDURE DATE:  03/02/2022          INTERPRETATION:  CLINICAL INFORMATION: Right upper quadrant abdominal pain    COMPARISON: 11/20/2013    TECHNIQUE: Sonography of the right upper quadrant abdomen.    FINDINGS:    Liver: Within normal limits.  Bile ducts: Normal caliber. Common bile duct measures 5 mm.  Gallbladder: Status post cholecystectomy.  Pancreas: Visualized portions are within normal limits.  Right kidney: 12.7 cm. No hydronephrosis. Within normal limits.  Ascites: None.  IVC: Visualized portions are within normal limits.    IMPRESSION:  Status post cholecystectomy; otherwise, unremarkable right upper quadrant   abdominal ultrasound.    --- End of Report ---            RONNY ORTIZ MD; Attending Radiologist  This document has been electronically signed. Mar  2 2022 10:07AM    ACC: [id number] EXAM:  XR CHEST PORTABLE URGENT 1V                          PROCEDURE DATE:  02/24/2022          INTERPRETATION:  CLINICAL INDICATION: 41 years  Male with sickle cell   crisis.    COMPARISON: 10/28/2021    The right Port-A-Cath is reidentified with tip at the cavoatrial junction.    AP view of the chest demonstrates mild pulmonary vascular congestion.   There is no focal consolidation. There is mild left basilar discoid   atelectasis.. There is no pleural effusion. There is no pneumothorax.    The heart is moderately enlarged. The mediastinum and maximilian cannot be   assessed due to rotation. Mild thoracic degenerative changes are present.    IMPRESSION:    Mild pulmonary vascular congestion.    Moderate cardiomegaly.    --- End of Report ---            ROJELIO VELARDE MD; Attending Radiologist  This document has been electronically signed. Feb 25 2022  1:10PM      Imaging  Reviewed:  YES/NO    Consultant(s) Notes Reviewed:   YES/ No      Plan of care was discussed with patient and /or primary care giver; all questions and concerns were addressed

## 2022-03-02 NOTE — CONSULT NOTE ADULT - SUBJECTIVE AND OBJECTIVE BOX
INITIAL GI CONSULTATION    Patient is a 41y old  Male who presents with a chief complaint of Sickle Cell Crisis (02 Mar 2022 10:56)    HPI:  Patient is a 40 y/o male, from home, with significant medical history of Sickle Cell Disease (follows with Dr. Julian MITCHELL), and Gout who presents with abdominal pain, nausea, and vomiting. He is a longstanding SCD patient with multiple crises and RBCs transfusion received since the last 6 months,Patient has a baseline hemoglobin 5.5g/dl.   Pt was  admitted to [facility] 2/24-2/26 for sickle cell crisis when he was found to be anemic to 4.4 and was transfused.  Pt was subsequently discharged.  Yesterday, the patient was home and it was his sisters birthday.  He ate pasta and cake then had severe abdominal pain,  nausea and vomiting began after.  He also reports headache and joint pain .   patient denies any dizziness,  palpitations, shortness of breath,  urinary symptoms or any other acute complaint.   (28 Feb 2022 11:24)    GI HPI:  Pt is a 41 year old male admitted for sickle cell crisis, pain being managed with Dilaudid, Tylenol, Oxycodone. GI consulted for abdominal pain. Upon seeing patient, patient reports improved abdominal pain, no longer has nausea/vomiting. Only drinks alcohol every once in a while, not significant. Patient is icteric on examination, claims that he has had this all his life but did notice a recent worsening in the past few weeks. Urine is dark. Has undergone multiple transfusions during this admission, Hgb now 6.4. Liver functions tests going up since admission, concerning for underlying liver dysfunction in addition to hemolysis picture. US abdomen negative, patient had lap becca in past. On Protonix and Zofran.     PMH/PSH:  PAST MEDICAL & SURGICAL HISTORY:  Sickle cell anemia    Gout    History of cholecystectomy      FH:  FAMILY HISTORY:  Family history of sickle cell trait (Father, Mother)        MEDS:  MEDICATIONS  (STANDING):  allopurinol 100 milliGRAM(s) Oral daily  chlorhexidine 2% Cloths 1 Application(s) Topical daily  colchicine 0.6 milliGRAM(s) Oral daily  enoxaparin Injectable 40 milliGRAM(s) SubCutaneous daily  folic acid 1 milliGRAM(s) Oral daily  lactated ringers. 1000 milliLiter(s) (90 mL/Hr) IV Continuous <Continuous>  pantoprazole    Tablet 40 milliGRAM(s) Oral before breakfast    MEDICATIONS  (PRN):  acetaminophen     Tablet .. 650 milliGRAM(s) Oral every 6 hours PRN Temp greater or equal to 38C (100.4F), Mild Pain (1 - 3)  aluminum hydroxide/magnesium hydroxide/simethicone Suspension 30 milliLiter(s) Oral every 4 hours PRN Dyspepsia  HYDROmorphone  Injectable 3 milliGRAM(s) IV Push every 3 hours PRN Severe Pain (7 - 10)  melatonin 3 milliGRAM(s) Oral at bedtime PRN Insomnia  ondansetron Injectable 4 milliGRAM(s) IV Push every 6 hours PRN Nausea and/or Vomiting  oxyCODONE    IR 30 milliGRAM(s) Oral every 4 hours PRN Moderate Pain (4 - 6)    Allergies    ceftriaxone (Anaphylaxis)  hydroxyurea (Other)  penicillin (Pruritus)  piperacillin-tazobactam (Urticaria)    Intolerances            CONSTITUTIONAL:  No weight loss, fever, chills, weakness or fatigue.  HEENT:  Eyes:  No visual loss, blurred vision, double vision or yellow sclerae. Ears, Nose, Throat:  No hearing loss, sneezing, congestion, runny nose or sore throat.  SKIN:  No rash or itching.  CARDIOVASCULAR:  No chest pain, chest pressure or chest discomfort. No palpitations or edema.  RESPIRATORY:  No shortness of breath, cough or sputum.  GASTROINTESTINAL:  SEE HPI  GENITOURINARY:  No dysuria, hematuria, urinary frequency  NEUROLOGICAL:  No headache, dizziness, syncope, paralysis, ataxia, numbness or tingling in the extremities. No change in bowel or bladder control.  MUSCULOSKELETAL:  No muscle, back pain, joint pain or stiffness.  HEMATOLOGIC:  No anemia, bleeding or bruising.  LYMPHATICS:  No enlarged nodes. No history of splenectomy.  PSYCHIATRIC:  No history of depression or anxiety.  ENDOCRINOLOGIC:  No reports of sweating, cold or heat intolerance. No polyuria or polydipsia.      ______________________________________________________________________  PHYSICAL EXAM:  T(C): 36.3 (03-02-22 @ 14:32), Max: 37.1 (03-01-22 @ 19:11)  HR: 76 (03-02-22 @ 14:32)  BP: 130/73 (03-02-22 @ 14:32)  RR: 17 (03-02-22 @ 14:32)  SpO2: 96% (03-02-22 @ 14:32)  Wt(kg): --      GEN: NAD, normocephalic  Ophtho: Scleral icterus  CVS: S1S2+  CHEST: clear to auscultation  ABD: soft , nontender, nondistended, bowel sounds present  EXTR: no cyanosis, no clubbing, no edema  NEURO: Awake and alert; oriented .....  SKIN:  warm;  non icteric    ______________________________________________________________________  LABS:                        6.4    7.66  )-----------( 200      ( 02 Mar 2022 07:16 )             17.8     03-02    142  |  113<H>  |  20<H>  ----------------------------<  90  3.8   |  23  |  0.78    Ca    9.0      02 Mar 2022 07:16  Phos  2.6     03-01  Mg     1.6     03-01    TPro  6.6  /  Alb  2.9<L>  /  TBili  13.0<H>  /  DBili  6.9<H>  /  AST  163<H>  /  ALT  71<H>  /  AlkPhos  121<H>  03-02    LIVER FUNCTIONS - ( 02 Mar 2022 09:24 )  Alb: x     / Pro: x     / ALK PHOS: x     / ALT: x     / AST: x     / GGT: 130 U/L       PT/INR - ( 01 Mar 2022 11:14 )   PT: 15.1 sec;   INR: 1.27 ratio         PTT - ( 01 Mar 2022 11:14 )  PTT:29.1 sec  ____________________________________________    IMAGING:    ______________________________________________________________________  ASSESSMENT:  41y Male    PLAN:         INITIAL GI CONSULTATION    Patient is a 41y old  Male who presents with a chief complaint of Sickle Cell Crisis (02 Mar 2022 10:56)    HPI:  Patient is a 40 y/o male, from home, with significant medical history of Sickle Cell Disease (follows with Dr. Julian MITCHELL), and Gout who presents with abdominal pain, nausea, and vomiting. He is a longstanding SCD patient with multiple crises and RBCs transfusion received since the last 6 months,Patient has a baseline hemoglobin 5.5g/dl.   Pt was  admitted to [facility] 2/24-2/26 for sickle cell crisis when he was found to be anemic to 4.4 and was transfused.  Pt was subsequently discharged.  Yesterday, the patient was home and it was his sisters birthday.  He ate pasta and cake then had severe abdominal pain,  nausea and vomiting began after.  He also reports headache and joint pain .   patient denies any dizziness,  palpitations, shortness of breath,  urinary symptoms or any other acute complaint.   (28 Feb 2022 11:24)    GI HPI:  Pt is a 41 year old male admitted for sickle cell crisis, pain being managed with Dilaudid, Tylenol, Oxycodone. GI consulted for abdominal pain. Upon seeing patient, patient reports improved abdominal pain, no longer has nausea/vomiting. Only drinks alcohol every once in a while, not significant. Patient is icteric on examination, claims that he has had this all his life but did notice a recent worsening in the past few weeks. Urine is dark. Has undergone multiple transfusions during this admission, Hgb now 6.4. Liver functions tests going up since admission, concerning for underlying liver dysfunction in addition to hemolysis picture. US abdomen negative, patient had lap becca in past. On Protonix and Zofran.     PMH/PSH:  PAST MEDICAL & SURGICAL HISTORY:  Sickle cell anemia    Gout    History of cholecystectomy      FH:  FAMILY HISTORY:  Family history of sickle cell trait (Father, Mother)        MEDS:  MEDICATIONS  (STANDING):  allopurinol 100 milliGRAM(s) Oral daily  chlorhexidine 2% Cloths 1 Application(s) Topical daily  colchicine 0.6 milliGRAM(s) Oral daily  enoxaparin Injectable 40 milliGRAM(s) SubCutaneous daily  folic acid 1 milliGRAM(s) Oral daily  lactated ringers. 1000 milliLiter(s) (90 mL/Hr) IV Continuous <Continuous>  pantoprazole    Tablet 40 milliGRAM(s) Oral before breakfast    MEDICATIONS  (PRN):  acetaminophen     Tablet .. 650 milliGRAM(s) Oral every 6 hours PRN Temp greater or equal to 38C (100.4F), Mild Pain (1 - 3)  aluminum hydroxide/magnesium hydroxide/simethicone Suspension 30 milliLiter(s) Oral every 4 hours PRN Dyspepsia  HYDROmorphone  Injectable 3 milliGRAM(s) IV Push every 3 hours PRN Severe Pain (7 - 10)  melatonin 3 milliGRAM(s) Oral at bedtime PRN Insomnia  ondansetron Injectable 4 milliGRAM(s) IV Push every 6 hours PRN Nausea and/or Vomiting  oxyCODONE    IR 30 milliGRAM(s) Oral every 4 hours PRN Moderate Pain (4 - 6)    Allergies    ceftriaxone (Anaphylaxis)  hydroxyurea (Other)  penicillin (Pruritus)  piperacillin-tazobactam (Urticaria)    Intolerances            CONSTITUTIONAL:  No weight loss, fever, chills, weakness or fatigue.  HEENT:  Eyes:  No visual loss, blurred vision, double vision or yellow sclerae. Ears, Nose, Throat:  No hearing loss, sneezing, congestion, runny nose or sore throat.  SKIN:  No rash or itching.  CARDIOVASCULAR:  No chest pain, chest pressure or chest discomfort. No palpitations or edema.  RESPIRATORY:  No shortness of breath, cough or sputum.  GASTROINTESTINAL:  SEE HPI  GENITOURINARY:  No dysuria, hematuria, urinary frequency  NEUROLOGICAL:  No headache, dizziness, syncope, paralysis, ataxia, numbness or tingling in the extremities. No change in bowel or bladder control.  MUSCULOSKELETAL:  No muscle, back pain, joint pain or stiffness.  HEMATOLOGIC:  No anemia, bleeding or bruising.  LYMPHATICS:  No enlarged nodes. No history of splenectomy.  PSYCHIATRIC:  No history of depression or anxiety.  ENDOCRINOLOGIC:  No reports of sweating, cold or heat intolerance. No polyuria or polydipsia.      ______________________________________________________________________  PHYSICAL EXAM:  T(C): 36.3 (03-02-22 @ 14:32), Max: 37.1 (03-01-22 @ 19:11)  HR: 76 (03-02-22 @ 14:32)  BP: 130/73 (03-02-22 @ 14:32)  RR: 17 (03-02-22 @ 14:32)  SpO2: 96% (03-02-22 @ 14:32)  Wt(kg): --      GEN: NAD, normocephalic  Ophtho: Scleral icterus  CVS: S1S2+  CHEST: clear to auscultation  ABD: soft , nontender, nondistended, bowel sounds present  EXTR: no cyanosis, no clubbing, no edema  NEURO: Awake and alert; oriented x3  SKIN:  warm;  icteric    ______________________________________________________________________  LABS:                        6.4    7.66  )-----------( 200      ( 02 Mar 2022 07:16 )             17.8     03-02    142  |  113<H>  |  20<H>  ----------------------------<  90  3.8   |  23  |  0.78    Ca    9.0      02 Mar 2022 07:16  Phos  2.6     03-01  Mg     1.6     03-01    TPro  6.6  /  Alb  2.9<L>  /  TBili  13.0<H>  /  DBili  6.9<H>  /  AST  163<H>  /  ALT  71<H>  /  AlkPhos  121<H>  03-02    LIVER FUNCTIONS - ( 02 Mar 2022 09:24 )  Alb: x     / Pro: x     / ALK PHOS: x     / ALT: x     / AST: x     / GGT: 130 U/L       PT/INR - ( 01 Mar 2022 11:14 )   PT: 15.1 sec;   INR: 1.27 ratio         PTT - ( 01 Mar 2022 11:14 )  PTT:29.1 sec  ____________________________________________    IMAGING:    < from: US Abdomen Complete (US Abdomen Complete .) (03.02.22 @ 10:01) >    ACC: [id number] EXAM:  US ABDOMEN COMPLETE                          PROCEDURE DATE:  03/02/2022      INTERPRETATION:  CLINICAL INFORMATION: Right upper quadrant abdominal pain    COMPARISON: 11/20/2013    TECHNIQUE: Sonography of the right upper quadrant abdomen.    FINDINGS:    Liver: Within normal limits.  Bile ducts: Normal caliber. Common bile duct measures 5 mm.  Gallbladder: Status post cholecystectomy.  Pancreas: Visualized portions are within normal limits.  Right kidney: 12.7 cm. No hydronephrosis. Within normal limits.  Ascites: None.  IVC: Visualized portions are within normal limits.    IMPRESSION:  Status post cholecystectomy; otherwise, unremarkable right upper quadrant   abdominal ultrasound.

## 2022-03-02 NOTE — CONSULT NOTE ADULT - ASSESSMENT
Pt is a 41 year old male admitted for sickle cell crisis, pain being managed with Dilaudid, Tylenol, Oxycodone. GI consulted for abdominal pain. Upon seeing patient, patient reports improved abdominal pain, no longer has nausea/vomiting. Only drinks alcohol every once in a while, not significant. Patient is icteric on examination, claims that he has had this all his life but did notice a recent worsening in the past few weeks. Urine is dark. Has undergone multiple transfusions during this admission, Hgb now 6.4. Liver functions tests going up since admission, concerning for underlying liver dysfunction in addition to hemolysis picture. US abdomen negative, patient had lap becca in past. On Protonix and Zofran.

## 2022-03-03 LAB
ALBUMIN SERPL ELPH-MCNC: 3 G/DL — LOW (ref 3.5–5)
ALP SERPL-CCNC: 116 U/L — SIGNIFICANT CHANGE UP (ref 40–120)
ALT FLD-CCNC: 74 U/L DA — HIGH (ref 10–60)
ANION GAP SERPL CALC-SCNC: 4 MMOL/L — LOW (ref 5–17)
AST SERPL-CCNC: 136 U/L — HIGH (ref 10–40)
BILIRUB SERPL-MCNC: 10.1 MG/DL — HIGH (ref 0.2–1.2)
BUN SERPL-MCNC: 16 MG/DL — SIGNIFICANT CHANGE UP (ref 7–18)
CALCIUM SERPL-MCNC: 8.5 MG/DL — SIGNIFICANT CHANGE UP (ref 8.4–10.5)
CHLORIDE SERPL-SCNC: 112 MMOL/L — HIGH (ref 96–108)
CO2 SERPL-SCNC: 25 MMOL/L — SIGNIFICANT CHANGE UP (ref 22–31)
CREAT SERPL-MCNC: 0.66 MG/DL — SIGNIFICANT CHANGE UP (ref 0.5–1.3)
EGFR: 120 ML/MIN/1.73M2 — SIGNIFICANT CHANGE UP
GLUCOSE SERPL-MCNC: 97 MG/DL — SIGNIFICANT CHANGE UP (ref 70–99)
HAPTOGLOB SERPL-MCNC: <20 MG/DL — LOW (ref 34–200)
HAV IGM SER-ACNC: SIGNIFICANT CHANGE UP
HBV CORE IGM SER-ACNC: SIGNIFICANT CHANGE UP
HBV SURFACE AG SER-ACNC: SIGNIFICANT CHANGE UP
HCT VFR BLD CALC: 16.8 % — CRITICAL LOW (ref 39–50)
HCV AB S/CO SERPL IA: 0.11 S/CO — SIGNIFICANT CHANGE UP (ref 0–0.99)
HCV AB SERPL-IMP: SIGNIFICANT CHANGE UP
HGB BLD-MCNC: 6 G/DL — CRITICAL LOW (ref 13–17)
LDH SERPL L TO P-CCNC: 889 U/L — HIGH (ref 120–225)
MCHC RBC-ENTMCNC: 31.6 PG — SIGNIFICANT CHANGE UP (ref 27–34)
MCHC RBC-ENTMCNC: 35.7 GM/DL — SIGNIFICANT CHANGE UP (ref 32–36)
MCV RBC AUTO: 88.4 FL — SIGNIFICANT CHANGE UP (ref 80–100)
NRBC # BLD: 0 /100 WBCS — SIGNIFICANT CHANGE UP (ref 0–0)
PLATELET # BLD AUTO: 199 K/UL — SIGNIFICANT CHANGE UP (ref 150–400)
POTASSIUM SERPL-MCNC: 3.5 MMOL/L — SIGNIFICANT CHANGE UP (ref 3.5–5.3)
POTASSIUM SERPL-SCNC: 3.5 MMOL/L — SIGNIFICANT CHANGE UP (ref 3.5–5.3)
PROT SERPL-MCNC: 6.5 G/DL — SIGNIFICANT CHANGE UP (ref 6–8.3)
RBC # BLD: 1.9 M/UL — LOW (ref 4.2–5.8)
RBC # FLD: 18.6 % — HIGH (ref 10.3–14.5)
SODIUM SERPL-SCNC: 141 MMOL/L — SIGNIFICANT CHANGE UP (ref 135–145)
WBC # BLD: 7.77 K/UL — SIGNIFICANT CHANGE UP (ref 3.8–10.5)
WBC # FLD AUTO: 7.77 K/UL — SIGNIFICANT CHANGE UP (ref 3.8–10.5)

## 2022-03-03 PROCEDURE — 99232 SBSQ HOSP IP/OBS MODERATE 35: CPT

## 2022-03-03 PROCEDURE — 83020 HEMOGLOBIN ELECTROPHORESIS: CPT | Mod: 26

## 2022-03-03 PROCEDURE — 99233 SBSQ HOSP IP/OBS HIGH 50: CPT

## 2022-03-03 PROCEDURE — 99223 1ST HOSP IP/OBS HIGH 75: CPT

## 2022-03-03 RX ORDER — POLYETHYLENE GLYCOL 3350 17 G/17G
17 POWDER, FOR SOLUTION ORAL DAILY
Refills: 0 | Status: DISCONTINUED | OUTPATIENT
Start: 2022-03-03 | End: 2022-03-07

## 2022-03-03 RX ORDER — SENNA PLUS 8.6 MG/1
2 TABLET ORAL AT BEDTIME
Refills: 0 | Status: DISCONTINUED | OUTPATIENT
Start: 2022-03-03 | End: 2022-03-07

## 2022-03-03 RX ORDER — SODIUM CHLORIDE 9 MG/ML
1000 INJECTION, SOLUTION INTRAVENOUS
Refills: 0 | Status: COMPLETED | OUTPATIENT
Start: 2022-03-03 | End: 2022-03-04

## 2022-03-03 RX ADMIN — SODIUM CHLORIDE 90 MILLILITER(S): 9 INJECTION, SOLUTION INTRAVENOUS at 15:18

## 2022-03-03 RX ADMIN — HYDROMORPHONE HYDROCHLORIDE 3 MILLIGRAM(S): 2 INJECTION INTRAMUSCULAR; INTRAVENOUS; SUBCUTANEOUS at 18:31

## 2022-03-03 RX ADMIN — ONDANSETRON 4 MILLIGRAM(S): 8 TABLET, FILM COATED ORAL at 08:48

## 2022-03-03 RX ADMIN — HYDROMORPHONE HYDROCHLORIDE 3 MILLIGRAM(S): 2 INJECTION INTRAMUSCULAR; INTRAVENOUS; SUBCUTANEOUS at 15:04

## 2022-03-03 RX ADMIN — HYDROMORPHONE HYDROCHLORIDE 3 MILLIGRAM(S): 2 INJECTION INTRAMUSCULAR; INTRAVENOUS; SUBCUTANEOUS at 19:11

## 2022-03-03 RX ADMIN — HYDROMORPHONE HYDROCHLORIDE 3 MILLIGRAM(S): 2 INJECTION INTRAMUSCULAR; INTRAVENOUS; SUBCUTANEOUS at 08:52

## 2022-03-03 RX ADMIN — HYDROMORPHONE HYDROCHLORIDE 3 MILLIGRAM(S): 2 INJECTION INTRAMUSCULAR; INTRAVENOUS; SUBCUTANEOUS at 05:38

## 2022-03-03 RX ADMIN — CHLORHEXIDINE GLUCONATE 1 APPLICATION(S): 213 SOLUTION TOPICAL at 11:45

## 2022-03-03 RX ADMIN — HYDROMORPHONE HYDROCHLORIDE 3 MILLIGRAM(S): 2 INJECTION INTRAMUSCULAR; INTRAVENOUS; SUBCUTANEOUS at 21:40

## 2022-03-03 RX ADMIN — PANTOPRAZOLE SODIUM 40 MILLIGRAM(S): 20 TABLET, DELAYED RELEASE ORAL at 05:07

## 2022-03-03 RX ADMIN — POLYETHYLENE GLYCOL 3350 17 GRAM(S): 17 POWDER, FOR SOLUTION ORAL at 15:16

## 2022-03-03 RX ADMIN — HYDROMORPHONE HYDROCHLORIDE 3 MILLIGRAM(S): 2 INJECTION INTRAMUSCULAR; INTRAVENOUS; SUBCUTANEOUS at 08:31

## 2022-03-03 RX ADMIN — Medication 1 MILLIGRAM(S): at 11:44

## 2022-03-03 RX ADMIN — SENNA PLUS 2 TABLET(S): 8.6 TABLET ORAL at 21:10

## 2022-03-03 RX ADMIN — ONDANSETRON 4 MILLIGRAM(S): 8 TABLET, FILM COATED ORAL at 00:12

## 2022-03-03 RX ADMIN — HYDROMORPHONE HYDROCHLORIDE 3 MILLIGRAM(S): 2 INJECTION INTRAMUSCULAR; INTRAVENOUS; SUBCUTANEOUS at 05:08

## 2022-03-03 RX ADMIN — Medication 0.6 MILLIGRAM(S): at 11:45

## 2022-03-03 RX ADMIN — HYDROMORPHONE HYDROCHLORIDE 3 MILLIGRAM(S): 2 INJECTION INTRAMUSCULAR; INTRAVENOUS; SUBCUTANEOUS at 02:10

## 2022-03-03 RX ADMIN — HYDROMORPHONE HYDROCHLORIDE 3 MILLIGRAM(S): 2 INJECTION INTRAMUSCULAR; INTRAVENOUS; SUBCUTANEOUS at 21:10

## 2022-03-03 RX ADMIN — HYDROMORPHONE HYDROCHLORIDE 3 MILLIGRAM(S): 2 INJECTION INTRAMUSCULAR; INTRAVENOUS; SUBCUTANEOUS at 01:40

## 2022-03-03 RX ADMIN — ONDANSETRON 4 MILLIGRAM(S): 8 TABLET, FILM COATED ORAL at 16:09

## 2022-03-03 RX ADMIN — HYDROMORPHONE HYDROCHLORIDE 3 MILLIGRAM(S): 2 INJECTION INTRAMUSCULAR; INTRAVENOUS; SUBCUTANEOUS at 15:50

## 2022-03-03 RX ADMIN — ENOXAPARIN SODIUM 40 MILLIGRAM(S): 100 INJECTION SUBCUTANEOUS at 11:47

## 2022-03-03 RX ADMIN — Medication 100 MILLIGRAM(S): at 11:44

## 2022-03-03 RX ADMIN — HYDROMORPHONE HYDROCHLORIDE 3 MILLIGRAM(S): 2 INJECTION INTRAMUSCULAR; INTRAVENOUS; SUBCUTANEOUS at 13:17

## 2022-03-03 RX ADMIN — HYDROMORPHONE HYDROCHLORIDE 3 MILLIGRAM(S): 2 INJECTION INTRAMUSCULAR; INTRAVENOUS; SUBCUTANEOUS at 11:45

## 2022-03-03 NOTE — CONSULT NOTE ADULT - ASSESSMENT
40yo Male with Hx of Sickle Cell Disease with multiple sickle cell crisis and multiple PRBC transfusions in past, (following with Dr. Jordan), and Hx of gout presented to [facility] ED on 2/28/22 with abdominal pain, Nausea, multiple episodes of NBNB vomiting after eating "pasta and Tiramisu cake" at his sisters birthday and he also c/o headache and joint pains. He had recent hospitalization at [facility] 2/24-2/26/22 when he received PRBC transfusion b/o Hb 4.4 on 2/24/22.  He was admitted to medicine on 2/28/22 for sickle cell crisis, was started on iv hydration and pain medications.    Abnormal liver enzymes and hyperbilirubinemia could be multifactorial in sickle cell disease  He has evidence of hemolysis, but bilirubin not only indirect and was as high as 13. F/u Hb electrophoresis.   He is s/p cholecystectomy and normal bile ducts reported on US abd, thus choledocholithiasis less likely  Differential also include sickle cell hepatic crisis vs. intrahepatic cholestasis. No renal impairment.   C/w supportive measures, iv hydration and pain mgmt.   Obtain INR today and then daily  Fractionate bilirubin (Hepatic function panel and BMP instead of CMP)  C/w close monitoring LFTs, some improvement of bilirubin today  Follow up viral hepatitis serology, also can send Hep E  Allopurinol induced hepatotoxicity usually short latency, and often with immunoallergic features, but given that it is cholestatic, consider holding allopurinol  Avoid hepatotoxic medications  F/u hematology recommendations, including regarding transfusion    Thank you for consult  Will continue to follow  D/w primary team and GI       40yo Male with Hx of Sickle Cell Disease with multiple sickle cell crisis and multiple PRBC transfusions in past, (following with Dr. Jordan), and Hx of gout presented to [facility] ED on 2/28/22 with abdominal pain, Nausea, multiple episodes of NBNB vomiting after eating "pasta and Tiramisu cake" at his sisters birthday and he also c/o headache and joint pains. He had recent hospitalization at [facility] 2/24-2/26/22 when he received PRBC transfusion b/o Hb 4.4 on 2/24/22.  He was admitted to medicine on 2/28/22 for sickle cell crisis, was started on iv hydration and pain medications.    Abnormal liver enzymes and hyperbilirubinemia could be multifactorial in sickle cell disease  He has evidence of hemolysis, but bilirubin not only indirect and was as high as 13. F/u Hb electrophoresis.   He is s/p cholecystectomy and normal bile ducts reported on US abd, thus choledocholithiasis less likely  Differential also include sickle cell hepatic crisis vs. intrahepatic cholestasis. No renal impairment.   C/w supportive measures, iv hydration and pain mgmt.   Obtain INR today and then daily  Fractionate bilirubin (Hepatic function panel and BMP instead of CMP)  C/w close monitoring LFTs, some improvement of bilirubin today  Follow up viral hepatitis serology, also can send Hep E  Allopurinol induced hepatotoxicity usually short latency, and often with immunoallergic features, but given that it is cholestatic, consider holding allopurinol  Avoid hepatotoxic medications  F/u hematology recommendations, including regarding transfusion  Consider CT a/p     Thank you for consult  Will continue to follow  D/w primary team and GI       40yo Male with Hx of Sickle Cell Disease with multiple sickle cell crisis and multiple PRBC transfusions in past, (following with Dr. Jordan), and Hx of gout presented to [facility] ED on 2/28/22 with abdominal pain, Nausea, multiple episodes of NBNB vomiting after eating "pasta and Tiramisu cake" at his sisters birthday and he also c/o headache and joint pains. He had recent hospitalization at [facility] 2/24-2/26/22 when he received PRBC transfusion b/o Hb 4.4 on 2/24/22.  He was admitted to medicine on 2/28/22 for sickle cell crisis, was started on iv hydration and pain medications.    Abnormal liver enzymes and hyperbilirubinemia could be multifactorial in sickle cell disease  He has evidence of hemolysis, but bilirubin not only indirect and was as high as 13. F/u Hb electrophoresis.   He is s/p cholecystectomy and normal bile ducts reported on US abd, thus choledocholithiasis less likely  Differential also include sickle cell hepatic crisis vs. intrahepatic cholestasis. No renal impairment.   C/w supportive measures, iv hydration and pain mgmt.   Obtain INR today and then daily  Fractionate bilirubin (Hepatic function panel and BMP instead of CMP)  C/w close monitoring LFTs, some improvement of bilirubin today  Follow up viral hepatitis serology, also can send Hep E  Allopurinol induced hepatotoxicity usually short latency, and often with immunoallergic features, but given that it is cholestatic, consider holding allopurinol  Avoid hepatotoxic medications  F/u hematology recommendations, including regarding transfusion  Consider CT a/p, especially if abdominal pain persists     Thank you for consult  Will continue to follow  D/w primary team and GI

## 2022-03-03 NOTE — CONSULT NOTE ADULT - SUBJECTIVE AND OBJECTIVE BOX
Chief Complaint:  Patient is a 41y old  Male who presents with a chief complaint of Sickle Cell Crisis (02 Mar 2022 15:26)    INCOMPLETE NOTE, FULL NOTE TO FOLLOW    HPI:  TOLU VARGAS is a 42yo Male with Hx of Sickle Cell Disease with multiple sickle cell crisis and multiple PRBC transfusions in past, (following with Dr. Jordan), and Hx of gout presented to [facility] ED on 2/28/22 with abdominal pain, N/V after eating "pasta and cake" at his sisters birthday and he also c/o headache and joint pains. He had recent hospitalization at [facility] 2/24-2/26/22 when he received PRBC transfusion b/o Hb 4.4 on 2/24/22.  He was admitted to medicine on 2/28/22 for sickle cell crisis, was started on iv hydration and pain medications.  He has been afebrile, hemodynamically stable, on 2l NC O2. Labs significant for anemia with Hb 6.4->5.6-> s/p1U PRBC->6.0, haptoglobin <20, , normal lactate, hyperbilirubinemia and mild transaminase elevation. Noted bilirubin 8.1 on admission, rising to 13.0 (direct 6.9), and now improving 10.1. , ALT 74. ALP normalized. RUQ US 3/2/22 was unremarkable beyond s/p cholecystectomy.      PMHX/PSHX:    Sickle cell anemia  Gout  Anemia requiring transfusions  Marijuana abuse  Pneumonia  History of cholecystectomy      Allergies:  ceftriaxone (Anaphylaxis)  hydroxyurea (Other)  penicillin (Pruritus)  piperacillin-tazobactam (Urticaria)      Home Medications: reviewed  Hospital Medications:  acetaminophen     Tablet .. 650 milliGRAM(s) Oral every 6 hours PRN  allopurinol 100 milliGRAM(s) Oral daily  aluminum hydroxide/magnesium hydroxide/simethicone Suspension 30 milliLiter(s) Oral every 4 hours PRN  chlorhexidine 2% Cloths 1 Application(s) Topical daily  colchicine 0.6 milliGRAM(s) Oral daily  enoxaparin Injectable 40 milliGRAM(s) SubCutaneous daily  folic acid 1 milliGRAM(s) Oral daily  HYDROmorphone  Injectable 3 milliGRAM(s) IV Push every 3 hours PRN  lactated ringers. 1000 milliLiter(s) IV Continuous <Continuous>  melatonin 3 milliGRAM(s) Oral at bedtime PRN  ondansetron Injectable 4 milliGRAM(s) IV Push every 6 hours PRN  oxyCODONE    IR 30 milliGRAM(s) Oral every 4 hours PRN  pantoprazole    Tablet 40 milliGRAM(s) Oral before breakfast      Social History:   Tob: Denies  EtOH: Denies  Illicit Drugs: Denies    Family history:  Family history of sickle cell trait (Father, Mother)      Denies family history of colon cancer/polyps, stomach cancer/polyps, pancreatic cancer/masses, liver cancer/disease, ovarian cancer and endometrial cancer.    ROS:   General:  No  fevers, chills, night sweats, fatigue  Eyes:  Good vision, no reported pain  ENT:  No sore throat, pain, runny nose  CV:  No pain, palpitations  Pulm:  No dyspnea, cough  GI:  See HPI, otherwise negative  :  No  incontinence, nocturia  Muscle:  No pain, weakness  Neuro:  No memory problems  Psych:  No insomnia, mood problems, depression  Endocrine:  No polyuria, polydipsia, cold/heat intolerance  Heme:  No petechiae, ecchymosis, easy bruisability  Skin:  No rash    PHYSICAL EXAM:   Vital Signs:  Vital Signs Last 24 Hrs  T(C): 36.9 (03 Mar 2022 05:13), Max: 36.9 (03 Mar 2022 05:13)  T(F): 98.4 (03 Mar 2022 05:13), Max: 98.4 (03 Mar 2022 05:13)  HR: 77 (03 Mar 2022 05:13) (69 - 77)  BP: 125/72 (03 Mar 2022 05:13) (122/71 - 149/78)  BP(mean): --  RR: 16 (03 Mar 2022 05:13) (16 - 18)  SpO2: 95% (03 Mar 2022 05:13) (94% - 96%)  Daily     Daily     GENERAL: no acute distress  NEURO: alert, no asterixis  HEENT: anicteric sclera, no conjunctival pallor appreciated  CHEST: no respiratory distress, no accessory muscle use  CARDIAC: regular rate, rhythm  ABDOMEN: soft, non-tender, non-distended, no rebound or guarding  EXTREMITIES: warm, well perfused, no edema  SKIN: no lesions noted    LABS: reviewed                        6.0    7.77  )-----------( 199      ( 03 Mar 2022 06:27 )             16.8     03-03    141  |  112<H>  |  16  ----------------------------<  97  3.5   |  25  |  0.66    Ca    8.5      03 Mar 2022 06:27    TPro  6.5  /  Alb  3.0<L>  /  TBili  10.1<H>  /  DBili  x   /  AST  136<H>  /  ALT  74<H>  /  AlkPhos  116  03-03    LIVER FUNCTIONS - ( 03 Mar 2022 06:27 )  Alb: 3.0 g/dL / Pro: 6.5 g/dL / ALK PHOS: 116 U/L / ALT: 74 U/L DA / AST: 136 U/L / GGT: x               Diagnostic Studies: see sunrise for full report         Chief Complaint:  Patient is a 41y old  Male who presents with a chief complaint of Sickle Cell Crisis (02 Mar 2022 15:26)      HPI:  TOLU VARGAS is a 42yo Male with Hx of Sickle Cell Disease with multiple sickle cell crisis and multiple PRBC transfusions in past, (following with Dr. Jordan), and Hx of gout presented to [facility] ED on 2/28/22 with abdominal pain, Nausea, multiple episodes of NBNB vomiting after eating "pasta and Tiramisu cake" at his sisters birthday and he also c/o headache and joint pains. He had recent hospitalization at [facility] 2/24-2/26/22 when he received PRBC transfusion b/o Hb 4.4 on 2/24/22.  He was admitted to medicine on 2/28/22 for sickle cell crisis, was started on iv hydration and pain medications.  He has been afebrile, hemodynamically stable, on 2l NC O2. Labs significant for anemia with Hb 6.4->5.6-> s/p1U PRBC->6.0, haptoglobin <20, , normal lactate, hyperbilirubinemia and mild transaminase elevation. Noted bilirubin 8.1 on admission, rising to 13.0 (direct 6.9), and now improving 10.1. , ALT 74. ALP normalized. RUQ US 3/2/22 was unremarkable beyond s/p cholecystectomy.  Reports that been "always jaundiced", but recently worsened, along with darker urine. No change in stool color.  Denies alcohol or drugs other than marijuana. Reports taking oxycodone. Denies OTC meds or herbal supplements. No fever, chills. Abdominal pain mostly epigastric and periumbilical.   He is on allopurinol and colchicine for gout.    PMHX/PSHX:    Sickle cell anemia  Gout  Anemia requiring transfusions  Marijuana abuse  Pneumonia  History of cholecystectomy      Allergies:  ceftriaxone (Anaphylaxis)  hydroxyurea (Other)  penicillin (Pruritus)  piperacillin-tazobactam (Urticaria)      Home Medications: reviewed  Hospital Medications:  acetaminophen     Tablet .. 650 milliGRAM(s) Oral every 6 hours PRN  allopurinol 100 milliGRAM(s) Oral daily  aluminum hydroxide/magnesium hydroxide/simethicone Suspension 30 milliLiter(s) Oral every 4 hours PRN  chlorhexidine 2% Cloths 1 Application(s) Topical daily  colchicine 0.6 milliGRAM(s) Oral daily  enoxaparin Injectable 40 milliGRAM(s) SubCutaneous daily  folic acid 1 milliGRAM(s) Oral daily  HYDROmorphone  Injectable 3 milliGRAM(s) IV Push every 3 hours PRN  lactated ringers. 1000 milliLiter(s) IV Continuous <Continuous>  melatonin 3 milliGRAM(s) Oral at bedtime PRN  ondansetron Injectable 4 milliGRAM(s) IV Push every 6 hours PRN  oxyCODONE    IR 30 milliGRAM(s) Oral every 4 hours PRN  pantoprazole    Tablet 40 milliGRAM(s) Oral before breakfast      Social History:   EtOH: Denies  Uses marijuana    Family history:  Family history of sickle cell trait (Father, Mother). Denies liver disease.     ROS:   General:  No  fevers, but chills  Eyes:  Good vision, no reported pain  ENT:  No sore throat, pain, runny nose  CV:  No pain, palpitations  Pulm:  No dyspnea, cough  GI:  Still reports nausea and loss of appetite, but no more vomiting, no diarrhea, but still has abdominal pain (epigastric, periumbilical)  :  No  dysuria  Muscle:  No pain, weakness  Neuro:  No memory problems  Psych:  No insomnia, mood problems, depression  Endocrine:  No polyuria, polydipsia, cold/heat intolerance  Skin:  Icterus    PHYSICAL EXAM:   Vital Signs:  Vital Signs Last 24 Hrs  T(C): 36.9 (03 Mar 2022 05:13), Max: 36.9 (03 Mar 2022 05:13)  T(F): 98.4 (03 Mar 2022 05:13), Max: 98.4 (03 Mar 2022 05:13)  HR: 77 (03 Mar 2022 05:13) (69 - 77)  BP: 125/72 (03 Mar 2022 05:13) (122/71 - 149/78)  BP(mean): --  RR: 16 (03 Mar 2022 05:13) (16 - 18)  SpO2: 95% (03 Mar 2022 05:13) (94% - 96%)  Daily     Daily     GENERAL: no acute distress  NEURO: awake, alert, oriented x3, no asterixis  HEENT: icteric sclera  CHEST: no respiratory distress, no accessory muscle use  CARDIAC: regular rate, rhythm  ABDOMEN: soft, mildly distended, mild epigastric tenderness, no rebound or guarding, BS+, neg Louis  EXTREMITIES: warm, well perfused, no edema  SKIN: Icterus; tattoos    LABS: reviewed                        6.0    7.77  )-----------( 199      ( 03 Mar 2022 06:27 )             16.8     03-03    141  |  112<H>  |  16  ----------------------------<  97  3.5   |  25  |  0.66    Ca    8.5      03 Mar 2022 06:27    TPro  6.5  /  Alb  3.0<L>  /  TBili  10.1<H>  /  DBili  x   /  AST  136<H>  /  ALT  74<H>  /  AlkPhos  116  03-03    LIVER FUNCTIONS - ( 03 Mar 2022 06:27 )  Alb: 3.0 g/dL / Pro: 6.5 g/dL / ALK PHOS: 116 U/L / ALT: 74 U/L DA / AST: 136 U/L / GGT: x               Diagnostic Studies: see sunrise for full report

## 2022-03-03 NOTE — PROGRESS NOTE ADULT - ASSESSMENT
TOLU VARGAS is a 41y Male who presents with a chief complaint of sickle cell crisis.    Sickle Cell Disease  - Patient with known sickle cell disease. Baseline hemoglobin of around 5-6.  - Hold further transfusions unless patient has symptomatic anemia given he is above his baseline at this time. Received 1U PRBC on 3/1  - neg abdominal ultrasound.   - Continue pain control and IVF hydration.  - Advance diet as tolerated.  - Patient had been unable to tolerate hydroxyurea in the past. Continue folic acid supplementation.    hyperbilirubinemia -- GI and hepatology eval noted, out pt proportion for hemolysis per GI concerns  - bili improving today  - dbili 6.9  - w/u pending per GI  - f/u hepatology recs    abd pain, nausea -- consider CT a/p  -- f/u GI and liver    Will continue to follow. D/w pt.  On discharge, to follow-up with Dr. Shirley Jordan.

## 2022-03-03 NOTE — PROGRESS NOTE ADULT - ASSESSMENT
Patient is a 42 y/o male, from home, with significant medical history of Sickle Cell Disease (follows with Dr. Jordan OP), and Gout who presents with abdominal pain, nausea, and vomiting. He is a longstanding [facility] patient with multiple crises and RBCs transfusion received since the last 6 months, Patient has a baseline hemoglobin 5.5g/dl.  Patient admitted to medicine for Sickle cell crisis with lower back pain and was started on pain management with IV Dilaudid, Oxycodone and IVF. Patient has right chest Mediport accessed in ED for medication and IVF and is following with dr. Jordan as outpatient. Patient also found to be anemic with Hgb 5.6 and was ordered 1U PRBC. Abdominal US negative for acute findings, hem/onc consulted, patient is known to dr. Jordan, will hold further transfusion as pt baseline is around 6, continue with  pain management and IVF. Hepatology note appreciated, will hold Allopurinol and monitor INR, BMP and hepatic panel qd.

## 2022-03-03 NOTE — PROGRESS NOTE ADULT - SUBJECTIVE AND OBJECTIVE BOX
GI PROGRESS NOTE    Patient is a 41y old  Male who presents with a chief complaint of Sickle Cell Crisis (03 Mar 2022 11:22)      HPI:  Patient is a 42 y/o male, from home, with significant medical history of Sickle Cell Disease (follows with Dr. Julian MITCHELL), and Gout who presents with abdominal pain, nausea, and vomiting. He is a longstanding SCD patient with multiple crises and RBCs transfusion received since the last 6 months,Patient has a baseline hemoglobin 5.5g/dl.   Pt was  admitted to [facility] 2/24-2/26 for sickle cell crisis when he was found to be anemic to 4.4 and was transfused.  Pt was subsequently discharged.  Yesterday, the patient was home and it was his sisters birthday.  He ate pasta and cake then had severe abdominal pain,  nausea and vomiting began after.  He also reports headache and joint pain .   patient denies any dizziness,  palpitations, shortness of breath,  urinary symptoms or any other acute complaint.   (28 Feb 2022 11:24)    GI HPI:   Pt reports abdominal pain improving; denies nausea, vomiting today. Overall, pt feels well    ______________________________________________________________________  PMH/PSH:  PAST MEDICAL & SURGICAL HISTORY:  Sickle cell anemia    Gout    History of cholecystectomy      ______________________________________________________________________  MEDS:  MEDICATIONS  (STANDING):  allopurinol 100 milliGRAM(s) Oral daily  chlorhexidine 2% Cloths 1 Application(s) Topical daily  colchicine 0.6 milliGRAM(s) Oral daily  enoxaparin Injectable 40 milliGRAM(s) SubCutaneous daily  folic acid 1 milliGRAM(s) Oral daily  lactated ringers. 1000 milliLiter(s) (90 mL/Hr) IV Continuous <Continuous>  pantoprazole    Tablet 40 milliGRAM(s) Oral before breakfast  polyethylene glycol 3350 17 Gram(s) Oral daily  senna 2 Tablet(s) Oral at bedtime    MEDICATIONS  (PRN):  acetaminophen     Tablet .. 650 milliGRAM(s) Oral every 6 hours PRN Temp greater or equal to 38C (100.4F), Mild Pain (1 - 3)  aluminum hydroxide/magnesium hydroxide/simethicone Suspension 30 milliLiter(s) Oral every 4 hours PRN Dyspepsia  HYDROmorphone  Injectable 3 milliGRAM(s) IV Push every 3 hours PRN Severe Pain (7 - 10)  melatonin 3 milliGRAM(s) Oral at bedtime PRN Insomnia  ondansetron Injectable 4 milliGRAM(s) IV Push every 6 hours PRN Nausea and/or Vomiting  oxyCODONE    IR 30 milliGRAM(s) Oral every 4 hours PRN Moderate Pain (4 - 6)    ______________________________________________________________________  ALL:   Allergies    ceftriaxone (Anaphylaxis)  hydroxyurea (Other)  penicillin (Pruritus)  piperacillin-tazobactam (Urticaria)    Intolerances      ______________________________________________________________________  SH: Social History:  Previous smoker, denies alcohol use. Occasional marijuana use. (28 Feb 2022 11:24)    ______________________________________________________________________  FH:  FAMILY HISTORY:  Family history of sickle cell trait (Father, Mother)      ______________________________________________________________________  ROS:    CONSTITUTIONAL:  No weight loss, fever, chills, weakness or fatigue.    HEENT:  Eyes:  No visual loss, blurred vision, double vision or yellow sclerae. Ears, Nose, Throat:  No hearing loss, sneezing, congestion, runny nose or sore throat.    SKIN:  No rash or itching.    CARDIOVASCULAR:  No chest pain, chest pressure or chest discomfort. No palpitations or edema.    RESPIRATORY:  No shortness of breath, cough or sputum.    GASTROINTESTINAL:  SEE HPI    GENITOURINARY:  No dysuria, hematuria, urinary frequency    NEUROLOGICAL:  No headache, dizziness, syncope, paralysis, ataxia, numbness or tingling in the extremities. No change in bowel or bladder control.    MUSCULOSKELETAL:  No muscle, back pain, joint pain or stiffness.    HEMATOLOGIC:  No anemia, bleeding or bruising.    LYMPHATICS:  No enlarged nodes. No history of splenectomy.    PSYCHIATRIC:  No history of depression or anxiety.    ENDOCRINOLOGIC:  No reports of sweating, cold or heat intolerance. No polyuria or polydipsia.    ALLERGIES:  No history of asthma, hives, eczema or rhinitis.  ______________________________________________________________________  PHYSICAL EXAM:  T(C): 36.6 (03-03-22 @ 14:35), Max: 36.9 (03-03-22 @ 05:13)  HR: 64 (03-03-22 @ 14:35)  BP: 143/72 (03-03-22 @ 14:35)  RR: 16 (03-03-22 @ 14:35)  SpO2: 97% (03-03-22 @ 14:35)  Wt(kg): --      GEN: NAD, normocephalic  Ophtho: Scleral icterus  CVS: S1S2+  CHEST: clear to auscultation, port noted right upper chest  ABD: soft , nontender, nondistended, bowel sounds present  EXTR: no cyanosis, no clubbing, no edema  NEURO: Awake and alert; oriented x3  SKIN:  warm;  icteric        ______________________________________________________________________  LABS:                        6.0    7.77  )-----------( 199      ( 03 Mar 2022 06:27 )             16.8     03-03    141  |  112<H>  |  16  ----------------------------<  97  3.5   |  25  |  0.66    Ca    8.5      03 Mar 2022 06:27    TPro  6.5  /  Alb  3.0<L>  /  TBili  10.1<H>  /  DBili  x   /  AST  136<H>  /  ALT  74<H>  /  AlkPhos  116  03-03    LIVER FUNCTIONS - ( 03 Mar 2022 06:27 )  Alb: 3.0 g/dL / Pro: 6.5 g/dL / ALK PHOS: 116 U/L / ALT: 74 U/L DA / AST: 136 U/L / GGT: x           Haptoglobin, Serum in AM (03.03.22 @ 10:09)    Haptoglobin, Serum: <20: Test Repeated mg/dL

## 2022-03-03 NOTE — PROGRESS NOTE ADULT - SUBJECTIVE AND OBJECTIVE BOX
NP Note discussed with Primary Attending.    Patient is a 41y old  Male who presents with a chief complaint of Sickle Cell Crisis (03 Mar 2022 15:57)      INTERVAL HPI/OVERNIGHT EVENTS: no new complaints    MEDICATIONS  (STANDING):  allopurinol 100 milliGRAM(s) Oral daily  chlorhexidine 2% Cloths 1 Application(s) Topical daily  colchicine 0.6 milliGRAM(s) Oral daily  enoxaparin Injectable 40 milliGRAM(s) SubCutaneous daily  folic acid 1 milliGRAM(s) Oral daily  lactated ringers. 1000 milliLiter(s) (90 mL/Hr) IV Continuous <Continuous>  pantoprazole    Tablet 40 milliGRAM(s) Oral before breakfast  polyethylene glycol 3350 17 Gram(s) Oral daily  senna 2 Tablet(s) Oral at bedtime    MEDICATIONS  (PRN):  acetaminophen     Tablet .. 650 milliGRAM(s) Oral every 6 hours PRN Temp greater or equal to 38C (100.4F), Mild Pain (1 - 3)  aluminum hydroxide/magnesium hydroxide/simethicone Suspension 30 milliLiter(s) Oral every 4 hours PRN Dyspepsia  HYDROmorphone  Injectable 3 milliGRAM(s) IV Push every 3 hours PRN Severe Pain (7 - 10)  melatonin 3 milliGRAM(s) Oral at bedtime PRN Insomnia  ondansetron Injectable 4 milliGRAM(s) IV Push every 6 hours PRN Nausea and/or Vomiting  oxyCODONE    IR 30 milliGRAM(s) Oral every 4 hours PRN Moderate Pain (4 - 6)      __________________________________________________  REVIEW OF SYSTEMS:    CONSTITUTIONAL: No fever,   EYES: no acute visual disturbances  NECK: No pain or stiffness  RESPIRATORY: No cough; No shortness of breath  CARDIOVASCULAR: Sickle cell crisis, No chest pain, no palpitations  GASTROINTESTINAL: Abdominal pain.  nausea no vomiting; No diarrhea   NEUROLOGICAL: No headache or numbness, no tremors  MUSCULOSKELETAL: No joint pain, no muscle pain  GENITOURINARY: no dysuria, no frequency, no hesitancy  PSYCHIATRY: no depression , no anxiety  ALL OTHER  ROS negative        Vital Signs Last 24 Hrs  T(C): 36.6 (03 Mar 2022 14:35), Max: 36.9 (03 Mar 2022 05:13)  T(F): 97.9 (03 Mar 2022 14:35), Max: 98.4 (03 Mar 2022 05:13)  HR: 64 (03 Mar 2022 14:35) (64 - 77)  BP: 143/72 (03 Mar 2022 14:35) (122/71 - 143/72)  BP(mean): --  RR: 16 (03 Mar 2022 14:35) (16 - 18)  SpO2: 97% (03 Mar 2022 14:35) (94% - 97%)    ________________________________________________  PHYSICAL EXAM:  GENERAL: NAD  HEENT: Normocephalic;  conjunctivae and sclerae clear; moist mucous membranes;   NECK : supple  CHEST/LUNG: Clear to auscultation bilaterally with good air entry   HEART: S1 S2  regular; no murmurs, gallops or rubs  ABDOMEN: Soft, Nontender, Nondistended; Bowel sounds present  EXTREMITIES: no cyanosis; no edema; no calf tenderness  SKIN: warm and dry; no rash  NERVOUS SYSTEM:  Awake and alert; Oriented  to place, person and time ; no new deficits    _________________________________________________  LABS:                        6.0    7.77  )-----------( 199      ( 03 Mar 2022 06:27 )             16.8     03-03    141  |  112<H>  |  16  ----------------------------<  97  3.5   |  25  |  0.66    Ca    8.5      03 Mar 2022 06:27    TPro  6.5  /  Alb  3.0<L>  /  TBili  10.1<H>  /  DBili  x   /  AST  136<H>  /  ALT  74<H>  /  AlkPhos  116  03-03        CAPILLARY BLOOD GLUCOSE            RADIOLOGY & ADDITIONAL TESTS:  ACC: [id number] EXAM:  US ABDOMEN COMPLETE                          PROCEDURE DATE:  03/02/2022          INTERPRETATION:  CLINICAL INFORMATION: Right upper quadrant abdominal pain    COMPARISON: 11/20/2013    TECHNIQUE: Sonography of the right upper quadrant abdomen.    FINDINGS:    Liver: Within normal limits.  Bile ducts: Normal caliber. Common bile duct measures 5 mm.  Gallbladder: Status post cholecystectomy.  Pancreas: Visualized portions are within normal limits.  Right kidney: 12.7 cm. No hydronephrosis. Within normal limits.  Ascites: None.  IVC: Visualized portions are within normal limits.    IMPRESSION:  Status post cholecystectomy; otherwise, unremarkable right upper quadrant   abdominal ultrasound.    --- End of Report ---            RONNY ORTIZ MD; Attending Radiologist  This document has been electronically signed. Mar  2 2022 10:07AM    ACC: [id number] EXAM:  XR CHEST PORTABLE URGENT 1V                          PROCEDURE DATE:  02/24/2022          INTERPRETATION:  CLINICAL INDICATION: 41 years  Male with sickle cell   crisis.    COMPARISON: 10/28/2021    The right Port-A-Cath is reidentified with tip at the cavoatrial junction.    AP view of the chest demonstrates mild pulmonary vascular congestion.   There is no focal consolidation. There is mild left basilar discoid   atelectasis.. There is no pleural effusion. There is no pneumothorax.    The heart is moderately enlarged. The mediastinum and maximilian cannot be   assessed due to rotation. Mild thoracic degenerative changes are present.    IMPRESSION:    Mild pulmonary vascular congestion.    Moderate cardiomegaly.    --- End of Report ---            ROJELIO VELARDE MD; Attending Radiologist  This document has been electronically signed. Feb 25 2022  1:10PM      Imaging  Reviewed:  YES/NO    Consultant(s) Notes Reviewed:   YES/ No      Plan of care was discussed with patient and /or primary care giver; all questions and concerns were addressed

## 2022-03-03 NOTE — PROGRESS NOTE ADULT - SUBJECTIVE AND OBJECTIVE BOX
Pt seen, still with mid abd pain associated with nausea. Still with joint pains, c/w sickle cell pain, no other new complaints.     MEDICATIONS  (STANDING):  allopurinol 100 milliGRAM(s) Oral daily  chlorhexidine 2% Cloths 1 Application(s) Topical daily  colchicine 0.6 milliGRAM(s) Oral daily  enoxaparin Injectable 40 milliGRAM(s) SubCutaneous daily  folic acid 1 milliGRAM(s) Oral daily  lactated ringers. 1000 milliLiter(s) (90 mL/Hr) IV Continuous <Continuous>  pantoprazole    Tablet 40 milliGRAM(s) Oral before breakfast    MEDICATIONS  (PRN):  acetaminophen     Tablet .. 650 milliGRAM(s) Oral every 6 hours PRN Temp greater or equal to 38C (100.4F), Mild Pain (1 - 3)  aluminum hydroxide/magnesium hydroxide/simethicone Suspension 30 milliLiter(s) Oral every 4 hours PRN Dyspepsia  HYDROmorphone  Injectable 3 milliGRAM(s) IV Push every 3 hours PRN Severe Pain (7 - 10)  melatonin 3 milliGRAM(s) Oral at bedtime PRN Insomnia  ondansetron Injectable 4 milliGRAM(s) IV Push every 6 hours PRN Nausea and/or Vomiting  oxyCODONE    IR 30 milliGRAM(s) Oral every 4 hours PRN Moderate Pain (4 - 6)      ROS  No fever, sweats, chills  No epistaxis, HA, sore throat  No CP, SOB, cough, sputum  No melena, hematochezia  No edema  No rash  No anxiety  No bleeding, bruising  No dysuria, hematuria    Vital Signs Last 24 Hrs  T(C): 36.9 (03 Mar 2022 05:13), Max: 36.9 (03 Mar 2022 05:13)  T(F): 98.4 (03 Mar 2022 05:13), Max: 98.4 (03 Mar 2022 05:13)  HR: 77 (03 Mar 2022 05:13) (69 - 77)  BP: 125/72 (03 Mar 2022 05:13) (122/71 - 149/78)  BP(mean): --  RR: 16 (03 Mar 2022 05:13) (16 - 18)  SpO2: 95% (03 Mar 2022 05:13) (94% - 96%)    PE  NAD  Awake, alert  icteric  limited 2/2 covid pandemic                          6.0    7.77  )-----------( 199      ( 03 Mar 2022 06:27 )             16.8       03-03    141  |  112<H>  |  16  ----------------------------<  97  3.5   |  25  |  0.66    Ca    8.5      03 Mar 2022 06:27    TPro  6.5  /  Alb  3.0<L>  /  TBili  10.1<H>  /  DBili  x   /  AST  136<H>  /  ALT  74<H>  /  AlkPhos  116  03-03

## 2022-03-04 ENCOUNTER — TRANSCRIPTION ENCOUNTER (OUTPATIENT)
Age: 42
End: 2022-03-04

## 2022-03-04 LAB
ALBUMIN SERPL ELPH-MCNC: 2.9 G/DL — LOW (ref 3.5–5)
ALP SERPL-CCNC: 122 U/L — HIGH (ref 40–120)
ALT FLD-CCNC: 71 U/L DA — HIGH (ref 10–60)
ANION GAP SERPL CALC-SCNC: 4 MMOL/L — LOW (ref 5–17)
APTT BLD: 29.2 SEC — SIGNIFICANT CHANGE UP (ref 27.5–35.5)
AST SERPL-CCNC: 115 U/L — HIGH (ref 10–40)
BILIRUB DIRECT SERPL-MCNC: 4.4 MG/DL — HIGH (ref 0–0.3)
BILIRUB INDIRECT FLD-MCNC: 4.2 MG/DL — HIGH (ref 0.2–1)
BILIRUB SERPL-MCNC: 8.6 MG/DL — HIGH (ref 0.2–1.2)
BUN SERPL-MCNC: 13 MG/DL — SIGNIFICANT CHANGE UP (ref 7–18)
CALCIUM SERPL-MCNC: 8.7 MG/DL — SIGNIFICANT CHANGE UP (ref 8.4–10.5)
CHLORIDE SERPL-SCNC: 110 MMOL/L — HIGH (ref 96–108)
CO2 SERPL-SCNC: 26 MMOL/L — SIGNIFICANT CHANGE UP (ref 22–31)
CREAT SERPL-MCNC: 0.6 MG/DL — SIGNIFICANT CHANGE UP (ref 0.5–1.3)
EGFR: 124 ML/MIN/1.73M2 — SIGNIFICANT CHANGE UP
GLUCOSE SERPL-MCNC: 91 MG/DL — SIGNIFICANT CHANGE UP (ref 70–99)
HCT VFR BLD CALC: 16 % — CRITICAL LOW (ref 39–50)
HEMOGLOBIN INTERPRETATION: SIGNIFICANT CHANGE UP
HGB A MFR BLD: 56 % — LOW (ref 95.8–98)
HGB A2 MFR BLD: 2.7 % — SIGNIFICANT CHANGE UP (ref 2–3.2)
HGB BLD-MCNC: 5.9 G/DL — CRITICAL LOW (ref 13–17)
HGB F MFR BLD: 5.4 % — HIGH (ref 0–1)
HGB S MFR BLD: 35.9 % — HIGH
INR BLD: 1.15 RATIO — SIGNIFICANT CHANGE UP (ref 0.88–1.16)
MCHC RBC-ENTMCNC: 32.4 PG — SIGNIFICANT CHANGE UP (ref 27–34)
MCHC RBC-ENTMCNC: 36.9 GM/DL — HIGH (ref 32–36)
MCV RBC AUTO: 87.9 FL — SIGNIFICANT CHANGE UP (ref 80–100)
NRBC # BLD: 0 /100 WBCS — SIGNIFICANT CHANGE UP (ref 0–0)
PLATELET # BLD AUTO: 214 K/UL — SIGNIFICANT CHANGE UP (ref 150–400)
POTASSIUM SERPL-MCNC: 3.5 MMOL/L — SIGNIFICANT CHANGE UP (ref 3.5–5.3)
POTASSIUM SERPL-SCNC: 3.5 MMOL/L — SIGNIFICANT CHANGE UP (ref 3.5–5.3)
PROT SERPL-MCNC: 6.6 G/DL — SIGNIFICANT CHANGE UP (ref 6–8.3)
PROTHROM AB SERPL-ACNC: 13.7 SEC — HIGH (ref 10.5–13.4)
RBC # BLD: 1.82 M/UL — LOW (ref 4.2–5.8)
RBC # FLD: 18.4 % — HIGH (ref 10.3–14.5)
SODIUM SERPL-SCNC: 140 MMOL/L — SIGNIFICANT CHANGE UP (ref 135–145)
WBC # BLD: 8.72 K/UL — SIGNIFICANT CHANGE UP (ref 3.8–10.5)
WBC # FLD AUTO: 8.72 K/UL — SIGNIFICANT CHANGE UP (ref 3.8–10.5)

## 2022-03-04 PROCEDURE — 99233 SBSQ HOSP IP/OBS HIGH 50: CPT

## 2022-03-04 PROCEDURE — 74177 CT ABD & PELVIS W/CONTRAST: CPT | Mod: 26

## 2022-03-04 PROCEDURE — 99232 SBSQ HOSP IP/OBS MODERATE 35: CPT

## 2022-03-04 RX ORDER — SODIUM CHLORIDE 9 MG/ML
1000 INJECTION, SOLUTION INTRAVENOUS
Refills: 0 | Status: DISCONTINUED | OUTPATIENT
Start: 2022-03-04 | End: 2022-03-05

## 2022-03-04 RX ADMIN — HYDROMORPHONE HYDROCHLORIDE 3 MILLIGRAM(S): 2 INJECTION INTRAMUSCULAR; INTRAVENOUS; SUBCUTANEOUS at 06:42

## 2022-03-04 RX ADMIN — Medication 0.6 MILLIGRAM(S): at 11:13

## 2022-03-04 RX ADMIN — HYDROMORPHONE HYDROCHLORIDE 3 MILLIGRAM(S): 2 INJECTION INTRAMUSCULAR; INTRAVENOUS; SUBCUTANEOUS at 00:50

## 2022-03-04 RX ADMIN — HYDROMORPHONE HYDROCHLORIDE 3 MILLIGRAM(S): 2 INJECTION INTRAMUSCULAR; INTRAVENOUS; SUBCUTANEOUS at 09:52

## 2022-03-04 RX ADMIN — OXYCODONE HYDROCHLORIDE 30 MILLIGRAM(S): 5 TABLET ORAL at 11:11

## 2022-03-04 RX ADMIN — Medication 1 MILLIGRAM(S): at 11:13

## 2022-03-04 RX ADMIN — ENOXAPARIN SODIUM 40 MILLIGRAM(S): 100 INJECTION SUBCUTANEOUS at 11:13

## 2022-03-04 RX ADMIN — HYDROMORPHONE HYDROCHLORIDE 3 MILLIGRAM(S): 2 INJECTION INTRAMUSCULAR; INTRAVENOUS; SUBCUTANEOUS at 06:12

## 2022-03-04 RX ADMIN — HYDROMORPHONE HYDROCHLORIDE 3 MILLIGRAM(S): 2 INJECTION INTRAMUSCULAR; INTRAVENOUS; SUBCUTANEOUS at 13:25

## 2022-03-04 RX ADMIN — PANTOPRAZOLE SODIUM 40 MILLIGRAM(S): 20 TABLET, DELAYED RELEASE ORAL at 06:11

## 2022-03-04 RX ADMIN — HYDROMORPHONE HYDROCHLORIDE 3 MILLIGRAM(S): 2 INJECTION INTRAMUSCULAR; INTRAVENOUS; SUBCUTANEOUS at 01:20

## 2022-03-04 RX ADMIN — HYDROMORPHONE HYDROCHLORIDE 3 MILLIGRAM(S): 2 INJECTION INTRAMUSCULAR; INTRAVENOUS; SUBCUTANEOUS at 20:55

## 2022-03-04 RX ADMIN — CHLORHEXIDINE GLUCONATE 1 APPLICATION(S): 213 SOLUTION TOPICAL at 11:13

## 2022-03-04 RX ADMIN — OXYCODONE HYDROCHLORIDE 30 MILLIGRAM(S): 5 TABLET ORAL at 12:30

## 2022-03-04 RX ADMIN — HYDROMORPHONE HYDROCHLORIDE 3 MILLIGRAM(S): 2 INJECTION INTRAMUSCULAR; INTRAVENOUS; SUBCUTANEOUS at 21:25

## 2022-03-04 RX ADMIN — HYDROMORPHONE HYDROCHLORIDE 3 MILLIGRAM(S): 2 INJECTION INTRAMUSCULAR; INTRAVENOUS; SUBCUTANEOUS at 14:27

## 2022-03-04 RX ADMIN — HYDROMORPHONE HYDROCHLORIDE 3 MILLIGRAM(S): 2 INJECTION INTRAMUSCULAR; INTRAVENOUS; SUBCUTANEOUS at 17:54

## 2022-03-04 RX ADMIN — HYDROMORPHONE HYDROCHLORIDE 3 MILLIGRAM(S): 2 INJECTION INTRAMUSCULAR; INTRAVENOUS; SUBCUTANEOUS at 16:44

## 2022-03-04 RX ADMIN — HYDROMORPHONE HYDROCHLORIDE 3 MILLIGRAM(S): 2 INJECTION INTRAMUSCULAR; INTRAVENOUS; SUBCUTANEOUS at 09:16

## 2022-03-04 RX ADMIN — ONDANSETRON 4 MILLIGRAM(S): 8 TABLET, FILM COATED ORAL at 19:05

## 2022-03-04 RX ADMIN — ONDANSETRON 4 MILLIGRAM(S): 8 TABLET, FILM COATED ORAL at 10:09

## 2022-03-04 NOTE — PROGRESS NOTE ADULT - SUBJECTIVE AND OBJECTIVE BOX
GI PROGRESS NOTE    Patient is a 41y old  Male who presents with a chief complaint of Sickle Cell Crisis (04 Mar 2022 11:35)      HPI:  Patient is a 42 y/o male, from home, with significant medical history of Sickle Cell Disease (follows with Dr. Julian MITCHELL), and Gout who presents with abdominal pain, nausea, and vomiting. He is a longstanding SCD patient with multiple crises and RBCs transfusion received since the last 6 months,Patient has a baseline hemoglobin 5.5g/dl.   Pt was  admitted to [facility] 2/24-2/26 for sickle cell crisis when he was found to be anemic to 4.4 and was transfused.  Pt was subsequently discharged.  Yesterday, the patient was home and it was his sisters birthday.  He ate pasta and cake then had severe abdominal pain,  nausea and vomiting began after.  He also reports headache and joint pain .   patient denies any dizziness,  palpitations, shortness of breath,  urinary symptoms or any other acute complaint.   (28 Feb 2022 11:24)      GI HPI:   Pt reports abdominal pain improving; denies nausea, vomiting today. Overall, pt feels well    ______________________________________________________________________  PMH/PSH:  PAST MEDICAL & SURGICAL HISTORY:  Sickle cell anemia    Gout    History of cholecystectomy      ______________________________________________________________________  MEDS:  MEDICATIONS  (STANDING):  chlorhexidine 2% Cloths 1 Application(s) Topical daily  colchicine 0.6 milliGRAM(s) Oral daily  enoxaparin Injectable 40 milliGRAM(s) SubCutaneous daily  folic acid 1 milliGRAM(s) Oral daily  lactated ringers. 1000 milliLiter(s) (90 mL/Hr) IV Continuous <Continuous>  lactated ringers. 1000 milliLiter(s) (100 mL/Hr) IV Continuous <Continuous>  pantoprazole    Tablet 40 milliGRAM(s) Oral before breakfast  polyethylene glycol 3350 17 Gram(s) Oral daily  senna 2 Tablet(s) Oral at bedtime    MEDICATIONS  (PRN):  acetaminophen     Tablet .. 650 milliGRAM(s) Oral every 6 hours PRN Temp greater or equal to 38C (100.4F), Mild Pain (1 - 3)  aluminum hydroxide/magnesium hydroxide/simethicone Suspension 30 milliLiter(s) Oral every 4 hours PRN Dyspepsia  HYDROmorphone  Injectable 3 milliGRAM(s) IV Push every 3 hours PRN Severe Pain (7 - 10)  melatonin 3 milliGRAM(s) Oral at bedtime PRN Insomnia  ondansetron Injectable 4 milliGRAM(s) IV Push every 6 hours PRN Nausea and/or Vomiting  oxyCODONE    IR 30 milliGRAM(s) Oral every 4 hours PRN Moderate Pain (4 - 6)    ______________________________________________________________________  ALL:   Allergies    ceftriaxone (Anaphylaxis)  hydroxyurea (Other)  penicillin (Pruritus)  piperacillin-tazobactam (Urticaria)    Intolerances      ______________________________________________________________________  SH: Social History:  Previous smoker, denies alcohol use. Occasional marijuana use. (28 Feb 2022 11:24)    ______________________________________________________________________  FH:  FAMILY HISTORY:  Family history of sickle cell trait (Father, Mother)      ______________________________________________________________________  ROS:    CONSTITUTIONAL:  No weight loss, fever, chills, weakness or fatigue.    HEENT:  Eyes:  No visual loss, blurred vision, double vision or yellow sclerae. Ears, Nose, Throat:  No hearing loss, sneezing, congestion, runny nose or sore throat.    SKIN:  No rash or itching.    CARDIOVASCULAR:  No chest pain, chest pressure or chest discomfort. No palpitations or edema.    RESPIRATORY:  No shortness of breath, cough or sputum.    GASTROINTESTINAL:  SEE HPI    GENITOURINARY:  No dysuria, hematuria, urinary frequency    NEUROLOGICAL:  No headache, dizziness, syncope, paralysis, ataxia, numbness or tingling in the extremities. No change in bowel or bladder control.    MUSCULOSKELETAL:  No muscle, back pain, joint pain or stiffness.    HEMATOLOGIC:  No anemia, bleeding or bruising.    LYMPHATICS:  No enlarged nodes. No history of splenectomy.    PSYCHIATRIC:  No history of depression or anxiety.    ENDOCRINOLOGIC:  No reports of sweating, cold or heat intolerance. No polyuria or polydipsia.    ALLERGIES:  No history of asthma, hives, eczema or rhinitis.  ______________________________________________________________________  PHYSICAL EXAM:  T(C): 37 (03-04-22 @ 12:57), Max: 37.1 (03-03-22 @ 20:30)  HR: 63 (03-04-22 @ 12:57)  BP: 144/66 (03-04-22 @ 12:57)  RR: 16 (03-04-22 @ 12:57)  SpO2: 100% (03-04-22 @ 12:57)  Wt(kg): --      GEN: NAD   CVS- S1 S2  ABD: soft nontender, non distended, bowel sounds+  LUNGS: clear to auscultation  NEURO: noin focal neuro exam; AAO x 3  Extremities: no cyanosis, no calf tenderness, no edema, no clubbing      ______________________________________________________________________  LABS:                        5.9    8.72  )-----------( 214      ( 04 Mar 2022 06:34 )             16.0     03-04    140  |  110<H>  |  13  ----------------------------<  91  3.5   |  26  |  0.60    Ca    8.7      04 Mar 2022 06:34    TPro  6.6  /  Alb  2.9<L>  /  TBili  8.6<H>  /  DBili  4.4<H>  /  AST  115<H>  /  ALT  71<H>  /  AlkPhos  122<H>  03-04    LIVER FUNCTIONS - ( 04 Mar 2022 06:34 )  Alb: 2.9 g/dL / Pro: 6.6 g/dL / ALK PHOS: 122 U/L / ALT: 71 U/L DA / AST: 115 U/L / GGT: x           ______________________________________________________________________  IMAGING:    ______________________________________________________________________  < from: US Abdomen Complete (US Abdomen Complete .) (03.02.22 @ 10:01) >    ACC: [id number] EXAM:  US ABDOMEN COMPLETE                          PROCEDURE DATE:  03/02/2022          INTERPRETATION:  CLINICAL INFORMATION: Right upper quadrant abdominal pain    COMPARISON: 11/20/2013    TECHNIQUE: Sonography of the right upper quadrant abdomen.    FINDINGS:    Liver: Within normal limits.  Bile ducts: Normal caliber. Common bile duct measures 5 mm.  Gallbladder: Status post cholecystectomy.  Pancreas: Visualized portions are within normal limits.  Right kidney: 12.7 cm. No hydronephrosis. Within normal limits.  Ascites: None.  IVC: Visualized portions are within normal limits.    IMPRESSION:  Status post cholecystectomy; otherwise, unremarkable right upper quadrant   abdominal ultrasound.    --- End of Report ---        < end of copied text >

## 2022-03-04 NOTE — PROGRESS NOTE ADULT - PROBLEM SELECTOR PLAN 2
Pt with elevated liver enzymes and bilirubin  likely 2/2 sickle cell crisis  hold allopurinol as per hepatology  INR, BMP and hepatic panel qd  hepatology dr. Tristan consulted
Patient has history of Gout  - allopurinol 100mg, colchicine 0.6mg   - Resumed home meds.
Pt with elevated liver enzymes and bilirubin  trending down  likely 2/2 sickle cell crisis  hold allopurinol as per hepatology  INR, BMP and hepatic panel qd  hepatology dr. Tristan consulted
Patient has history of Gout  - allopurinol 100mg, colchicine 0.6mg   - Resumed home meds.

## 2022-03-04 NOTE — PROGRESS NOTE ADULT - SUBJECTIVE AND OBJECTIVE BOX
feel better  still has epigastric discomfort  only able to eat small amount food    MEDICATIONS  (STANDING):  chlorhexidine 2% Cloths 1 Application(s) Topical daily  colchicine 0.6 milliGRAM(s) Oral daily  enoxaparin Injectable 40 milliGRAM(s) SubCutaneous daily  folic acid 1 milliGRAM(s) Oral daily  lactated ringers. 1000 milliLiter(s) (90 mL/Hr) IV Continuous <Continuous>  pantoprazole    Tablet 40 milliGRAM(s) Oral before breakfast  polyethylene glycol 3350 17 Gram(s) Oral daily  senna 2 Tablet(s) Oral at bedtime    MEDICATIONS  (PRN):  acetaminophen     Tablet .. 650 milliGRAM(s) Oral every 6 hours PRN Temp greater or equal to 38C (100.4F), Mild Pain (1 - 3)  aluminum hydroxide/magnesium hydroxide/simethicone Suspension 30 milliLiter(s) Oral every 4 hours PRN Dyspepsia  HYDROmorphone  Injectable 3 milliGRAM(s) IV Push every 3 hours PRN Severe Pain (7 - 10)  melatonin 3 milliGRAM(s) Oral at bedtime PRN Insomnia  ondansetron Injectable 4 milliGRAM(s) IV Push every 6 hours PRN Nausea and/or Vomiting  oxyCODONE    IR 30 milliGRAM(s) Oral every 4 hours PRN Moderate Pain (4 - 6)      Allergies    ceftriaxone (Anaphylaxis)  hydroxyurea (Other)  penicillin (Pruritus)  piperacillin-tazobactam (Urticaria)    Intolerances        Vital Signs Last 24 Hrs  T(C): 36.3 (04 Mar 2022 05:18), Max: 37.1 (03 Mar 2022 20:30)  T(F): 97.3 (04 Mar 2022 05:18), Max: 98.7 (03 Mar 2022 20:30)  HR: 56 (04 Mar 2022 05:18) (56 - 72)  BP: 147/70 (04 Mar 2022 05:18) (135/75 - 147/70)  BP(mean): --  RR: 16 (04 Mar 2022 05:18) (16 - 18)  SpO2: 99% (04 Mar 2022 05:18) (97% - 100%)    PHYSICAL EXAM  General: adult in NAD  HEENT: clear oropharynx, anicteric sclera, pink conjunctiva  Neck: supple  CV: normal S1/S2 with no murmur rubs or gallops  Lungs: positive air movement b/l ant lungs,clear to auscultation, no wheezes, no rales  Abdomen: soft non-tender non-distended, no hepatosplenomegaly  Ext: no clubbing cyanosis or edema  Skin: no rashes and no petechiae  Neuro: alert and oriented X 4, no focal deficits  LABS:                          5.9    8.72  )-----------( 214      ( 04 Mar 2022 06:34 )             16.0         Mean Cell Volume : 87.9 fl  Mean Cell Hemoglobin : 32.4 pg  Mean Cell Hemoglobin Concentration : 36.9 gm/dL  Auto Neutrophil # : x  Auto Lymphocyte # : x  Auto Monocyte # : x  Auto Eosinophil # : x  Auto Basophil # : x  Auto Neutrophil % : x  Auto Lymphocyte % : x  Auto Monocyte % : x  Auto Eosinophil % : x  Auto Basophil % : x    Serial CBC  Hematocrit 16.0  Hemoglobin 5.9  Plat 214  RBC 1.82  WBC 8.72  Serial CBC  Hematocrit 16.8  Hemoglobin 6.0  Plat 199  RBC 1.90  WBC 7.77  Serial CBC  Hematocrit 17.8  Hemoglobin 6.4  Plat 200  RBC 2.00  WBC 7.66  Serial CBC  Hematocrit 15.7  Hemoglobin 5.6  Plat 211  RBC 1.78  WBC 5.94    03-04    140  |  110<H>  |  13  ----------------------------<  91  3.5   |  26  |  0.60    Ca    8.7      04 Mar 2022 06:34    TPro  6.6  /  Alb  2.9<L>  /  TBili  8.6<H>  /  DBili  4.4<H>  /  AST  115<H>  /  ALT  71<H>  /  AlkPhos  122<H>  03-04      PT/INR - ( 04 Mar 2022 06:34 )   PT: 13.7 sec;   INR: 1.15 ratio         PTT - ( 04 Mar 2022 06:34 )  PTT:29.2 sec    Reticulocyte Percent: 11.1 % (02-28 @ 07:21)            BLOOD SMEAR INTERPRETATION:       RADIOLOGY & ADDITIONAL STUDIES:

## 2022-03-04 NOTE — DISCHARGE NOTE PROVIDER - NSDCCPCAREPLAN_GEN_ALL_CORE_FT
PRINCIPAL DISCHARGE DIAGNOSIS  Diagnosis: Sickle cell crisis  Assessment and Plan of Treatment: Sickle cell anemia is when red blood cells are abnormal.  The RBCs get stuck together and are unable to bring enough oxygen to your organs  Call your doctor if any signs of infection, swelling, nausea or vomiting  Avoid dehydration by drinking adequate amount of fluids  Avoid stress  Avoid high altitude places and mountains where there is less oxygen  It is important to treat infetions right away even in the middle of the night - call your doctor with any temperature higher than 101.5  Keep your regular check up appointments with your doctor and follow his instructions carefully  Keep up with your vaccinations to prevent certain infections  Take medications as prescribed - you also will be on folic acid        SECONDARY DISCHARGE DIAGNOSES  Diagnosis: Gout  Assessment and Plan of Treatment: Continue with medication as prescribed.  Follow-up with your primary care physician .  Call your physician if you develop pain not relieved with pain regimen, fever and or swelling/redness in your extremity (ies).      Diagnosis: Elevated liver enzymes  Assessment and Plan of Treatment: You were admitted for Sickle Cell Crisis and also found to have elevated liver enzymes and bilirubin. Probably sickle cell induced cholestasis. Abdominal ultrasound negative for acute findings. Hepatitis panel was negative. Hepatology was consulted, please follow up with liver doctor (hepatologist ) after dischare and monitor your liver enzymes with your primary doctor.  Avoid drinking alcohol and cigarette smoking.

## 2022-03-04 NOTE — PROGRESS NOTE ADULT - PROBLEM SELECTOR PROBLEM 1
Elevated liver enzymes
Elevated liver enzymes
Sickle cell crisis

## 2022-03-04 NOTE — PROGRESS NOTE ADULT - ASSESSMENT
40yo Male with Hx of Sickle Cell Disease with multiple sickle cell crisis and multiple PRBC transfusions in past, (following with Dr. Jordan), and Hx of gout presented to [facility] ED on 2/28/22 with abdominal pain, Nausea, multiple episodes of NBNB vomiting after eating "pasta and Tiramisu cake" at his sisters birthday and he also c/o headache and joint pains. He had recent hospitalization at [facility] 2/24-2/26/22 when he received PRBC transfusion b/o Hb 4.4 on 2/24/22.  He was admitted to medicine on 2/28/22 for sickle cell crisis, was started on iv hydration and pain medications.    Abnormal liver enzymes and hyperbilirubinemia could be multifactorial in sickle cell disease  He has evidence of hemolysis, but bilirubin not only indirect and was as high as 13. F/u Hb electrophoresis.   He is s/p cholecystectomy and normal bile ducts reported on US abd, thus choledocholithiasis less likely.  Differential also include sickle cell hepatic crisis vs. intrahepatic cholestasis. No renal impairment.   C/w  supportive measures, iv hydration and pain mgmt.   C/w close monitoring LFTs, some further improvement of bilirubin today  Viral hepatitis serology neg (Hep A IgM, HBsAg, HBcAb IgM, HCV ab), also can send Hep E, HBcAb, HBsAB  F/u hematology recommendations, including regarding transfusion  Avoid hepatotoxic medications  Allopurinol induced hepatotoxicity usually short latency, and often with immunoallergic features, but given that it is cholestatic, consider continue holding allopurinol  Consider CT a/p if abdominal pain persists to r/o vaso-occlusive gastrointestinal ischemia, to d/w GI  Further management depend on course    Thank you for consult  Will continue to follow. Hepatology returns on Monday. Please, consult GI on call if change in status, questions, concerns,  D/w primary team, hematology and GI   40yo Male with Hx of Sickle Cell Disease with multiple sickle cell crisis and multiple PRBC transfusions in past, (following with Dr. Jordan), and Hx of gout presented to [facility] ED on 2/28/22 with abdominal pain, Nausea, multiple episodes of NBNB vomiting after eating "pasta and Tiramisu cake" at his sisters birthday and he also c/o headache and joint pains. He had recent hospitalization at [facility] 2/24-2/26/22 when he received PRBC transfusion b/o Hb 4.4 on 2/24/22.  He was admitted to medicine on 2/28/22 for sickle cell crisis, was started on iv hydration and pain medications.    Abnormal liver enzymes and hyperbilirubinemia could be multifactorial in sickle cell disease  He has evidence of hemolysis, but bilirubin not only indirect and was as high as 13. F/u Hb electrophoresis.   He is s/p cholecystectomy and normal bile ducts reported on US abd, thus choledocholithiasis less likely.  Differential also include sickle cell hepatic crisis vs. intrahepatic cholestasis. No renal impairment.   C/w  supportive measures, iv hydration and pain mgmt.   C/w close monitoring LFTs, some further improvement of bilirubin today  Viral hepatitis serology neg (Hep A IgM, HBsAg, HBcAb IgM, HCV ab), also can send Hep E, HBcAb, HBsAB  F/u hematology recommendations, including regarding transfusion  Avoid hepatotoxic medications  Allopurinol induced hepatotoxicity usually short latency, and often with immunoallergic features, but given that it is cholestatic, consider continue holding allopurinol  Consider CT a/p if abdominal pain persists to r/o vaso-occlusive gastrointestinal ischemia, obtain lactate, f/u with GI  Further management depend on course    Thank you for consult  Will continue to follow. Hepatology returns on Monday. Please, consult GI on call if change in status, questions, concerns,  D/w primary team, hematology and GI

## 2022-03-04 NOTE — PROGRESS NOTE ADULT - SUBJECTIVE AND OBJECTIVE BOX
NP Note discussed with Primary Attending.    Patient is a 41y old  Male who presents with a chief complaint of Sickle Cell Crisis (04 Mar 2022 14:39)      INTERVAL HPI/OVERNIGHT EVENTS: no new complaints    MEDICATIONS  (STANDING):  chlorhexidine 2% Cloths 1 Application(s) Topical daily  colchicine 0.6 milliGRAM(s) Oral daily  enoxaparin Injectable 40 milliGRAM(s) SubCutaneous daily  folic acid 1 milliGRAM(s) Oral daily  lactated ringers. 1000 milliLiter(s) (90 mL/Hr) IV Continuous <Continuous>  lactated ringers. 1000 milliLiter(s) (100 mL/Hr) IV Continuous <Continuous>  pantoprazole    Tablet 40 milliGRAM(s) Oral before breakfast  polyethylene glycol 3350 17 Gram(s) Oral daily  senna 2 Tablet(s) Oral at bedtime    MEDICATIONS  (PRN):  acetaminophen     Tablet .. 650 milliGRAM(s) Oral every 6 hours PRN Temp greater or equal to 38C (100.4F), Mild Pain (1 - 3)  aluminum hydroxide/magnesium hydroxide/simethicone Suspension 30 milliLiter(s) Oral every 4 hours PRN Dyspepsia  HYDROmorphone  Injectable 3 milliGRAM(s) IV Push every 3 hours PRN Severe Pain (7 - 10)  melatonin 3 milliGRAM(s) Oral at bedtime PRN Insomnia  ondansetron Injectable 4 milliGRAM(s) IV Push every 6 hours PRN Nausea and/or Vomiting  oxyCODONE    IR 30 milliGRAM(s) Oral every 4 hours PRN Moderate Pain (4 - 6)      __________________________________________________  REVIEW OF SYSTEMS:    CONSTITUTIONAL: No fever,   EYES: no acute visual disturbances  NECK: No pain or stiffness  RESPIRATORY: No cough; No shortness of breath  CARDIOVASCULAR: No chest pain, no palpitations  GASTROINTESTINAL: Mild pain. No nausea or vomiting; No diarrhea   NEUROLOGICAL: No headache or numbness, no tremors  MUSCULOSKELETAL: No joint pain, no muscle pain  GENITOURINARY: no dysuria, no frequency, no hesitancy  PSYCHIATRY: no depression , no anxiety  ALL OTHER  ROS negative        Vital Signs Last 24 Hrs  T(C): 37 (04 Mar 2022 12:57), Max: 37.1 (03 Mar 2022 20:30)  T(F): 98.6 (04 Mar 2022 12:57), Max: 98.7 (03 Mar 2022 20:30)  HR: 63 (04 Mar 2022 12:57) (56 - 72)  BP: 144/66 (04 Mar 2022 12:57) (135/75 - 147/70)  BP(mean): --  RR: 16 (04 Mar 2022 12:57) (16 - 18)  SpO2: 100% (04 Mar 2022 12:57) (99% - 100%)    ________________________________________________  PHYSICAL EXAM:  GENERAL: NAD  HEENT: Normocephalic;  conjunctivae and sclerae clear; moist mucous membranes;   NECK : supple  CHEST/LUNG: Clear to auscultation bilaterally with good air entry   HEART: S1 S2  regular; no murmurs, gallops or rubs  ABDOMEN: Soft, Nontender, Nondistended; Bowel sounds present  EXTREMITIES: no cyanosis; no edema; no calf tenderness  SKIN: warm and dry; no rash  NERVOUS SYSTEM:  Awake and alert; Oriented  to place, person and time ; no new deficits    _________________________________________________  LABS:                        5.9    8.72  )-----------( 214      ( 04 Mar 2022 06:34 )             16.0     03-04    140  |  110<H>  |  13  ----------------------------<  91  3.5   |  26  |  0.60    Ca    8.7      04 Mar 2022 06:34    TPro  6.6  /  Alb  2.9<L>  /  TBili  8.6<H>  /  DBili  4.4<H>  /  AST  115<H>  /  ALT  71<H>  /  AlkPhos  122<H>  03-04    PT/INR - ( 04 Mar 2022 06:34 )   PT: 13.7 sec;   INR: 1.15 ratio         PTT - ( 04 Mar 2022 06:34 )  PTT:29.2 sec    CAPILLARY BLOOD GLUCOSE            RADIOLOGY & ADDITIONAL TESTS:    ACC: [id number] EXAM:  US ABDOMEN COMPLETE                          PROCEDURE DATE:  03/02/2022          INTERPRETATION:  CLINICAL INFORMATION: Right upper quadrant abdominal pain    COMPARISON: 11/20/2013    TECHNIQUE: Sonography of the right upper quadrant abdomen.    FINDINGS:    Liver: Within normal limits.  Bile ducts: Normal caliber. Common bile duct measures 5 mm.  Gallbladder: Status post cholecystectomy.  Pancreas: Visualized portions are within normal limits.  Right kidney: 12.7 cm. No hydronephrosis. Within normal limits.  Ascites: None.  IVC: Visualized portions are within normal limits.    IMPRESSION:  Status post cholecystectomy; otherwise, unremarkable right upper quadrant   abdominal ultrasound.    --- End of Report ---            RONNY ORTIZ MD; Attending Radiologist  This document has been electronically signed. Mar  2 2022 10:07AM    ACC: [id number] EXAM:  XR CHEST PORTABLE URGENT 1V                          PROCEDURE DATE:  02/24/2022          INTERPRETATION:  CLINICAL INDICATION: 41 years  Male with sickle cell   crisis.    COMPARISON: 10/28/2021    The right Port-A-Cath is reidentified with tip at the cavoatrial junction.    AP view of the chest demonstrates mild pulmonary vascular congestion.   There is no focal consolidation. There is mild left basilar discoid   atelectasis.. There is no pleural effusion. There is no pneumothorax.    The heart is moderately enlarged. The mediastinum and maximilian cannot be   assessed due to rotation. Mild thoracic degenerative changes are present.    IMPRESSION:    Mild pulmonary vascular congestion.    Moderate cardiomegaly.    --- End of Report ---            ROJELIO VELARDE MD; Attending Radiologist  This document has been electronically signed. Feb 25 2022  1:10PM    Imaging  Reviewed:  YES/NO    Consultant(s) Notes Reviewed:   YES/ No      Plan of care was discussed with patient and /or primary care giver; all questions and concerns were addressed

## 2022-03-04 NOTE — PROGRESS NOTE ADULT - SUBJECTIVE AND OBJECTIVE BOX
Chief Complaint:  Patient is a 41y old  Male who presents with a chief complaint of Sickle Cell Crisis (04 Mar 2022 14:23)      Reason for consult:    Interval Events:     Hospital Medications:  acetaminophen     Tablet .. 650 milliGRAM(s) Oral every 6 hours PRN  aluminum hydroxide/magnesium hydroxide/simethicone Suspension 30 milliLiter(s) Oral every 4 hours PRN  chlorhexidine 2% Cloths 1 Application(s) Topical daily  colchicine 0.6 milliGRAM(s) Oral daily  enoxaparin Injectable 40 milliGRAM(s) SubCutaneous daily  folic acid 1 milliGRAM(s) Oral daily  HYDROmorphone  Injectable 3 milliGRAM(s) IV Push every 3 hours PRN  lactated ringers. 1000 milliLiter(s) IV Continuous <Continuous>  lactated ringers. 1000 milliLiter(s) IV Continuous <Continuous>  melatonin 3 milliGRAM(s) Oral at bedtime PRN  ondansetron Injectable 4 milliGRAM(s) IV Push every 6 hours PRN  oxyCODONE    IR 30 milliGRAM(s) Oral every 4 hours PRN  pantoprazole    Tablet 40 milliGRAM(s) Oral before breakfast  polyethylene glycol 3350 17 Gram(s) Oral daily  senna 2 Tablet(s) Oral at bedtime      ROS:   General:  No  fevers, chills, night sweats, fatigue  Eyes:  Good vision, no reported pain  ENT:  No sore throat, pain, runny nose  CV:  No pain, palpitations  Pulm:  No dyspnea, cough  GI:  See HPI, otherwise negative  :  No  incontinence, nocturia  Muscle:  No pain, weakness  Neuro:  No memory problems  Psych:  No insomnia, mood problems, depression  Endocrine:  No polyuria, polydipsia, cold/heat intolerance  Heme:  No petechiae, ecchymosis, easy bruisability  Skin:  No rash    PHYSICAL EXAM:   Vital Signs:  Vital Signs Last 24 Hrs  T(C): 37 (04 Mar 2022 12:57), Max: 37.1 (03 Mar 2022 20:30)  T(F): 98.6 (04 Mar 2022 12:57), Max: 98.7 (03 Mar 2022 20:30)  HR: 63 (04 Mar 2022 12:57) (56 - 72)  BP: 144/66 (04 Mar 2022 12:57) (135/75 - 147/70)  BP(mean): --  RR: 16 (04 Mar 2022 12:57) (16 - 18)  SpO2: 100% (04 Mar 2022 12:57) (99% - 100%)  Daily     Daily     GENERAL: no acute distress  NEURO: alert, no asterixis  HEENT: anicteric sclera, no conjunctival pallor appreciated  CHEST: no respiratory distress, no accessory muscle use  CARDIAC: regular rate, rhythm  ABDOMEN: soft, non-tender, non-distended, no rebound or guarding  EXTREMITIES: warm, well perfused, no edema  SKIN: no lesions noted    LABS: reviewed                        5.9    8.72  )-----------( 214      ( 04 Mar 2022 06:34 )             16.0     03-04    140  |  110<H>  |  13  ----------------------------<  91  3.5   |  26  |  0.60    Ca    8.7      04 Mar 2022 06:34    TPro  6.6  /  Alb  2.9<L>  /  TBili  8.6<H>  /  DBili  4.4<H>  /  AST  115<H>  /  ALT  71<H>  /  AlkPhos  122<H>  03-04    LIVER FUNCTIONS - ( 04 Mar 2022 06:34 )  Alb: 2.9 g/dL / Pro: 6.6 g/dL / ALK PHOS: 122 U/L / ALT: 71 U/L DA / AST: 115 U/L / GGT: x             Interval Diagnostic Studies: see sunrise for full report   Chief Complaint:  Patient is a 41y old  Male who presents with a chief complaint of Sickle Cell Crisis (04 Mar 2022 14:23)      Reason for consult:    Interval Events: Patient was seen and examined at bedside. He still required 7x 3mg Dilaudid. Upon questioning he states that both "bone pain" and "abdominal pain" similar, and reports loss of appetite, but no vomiting.  He is comfortable on exam, with mostly epigastric tenderness, but received Dilaudid 1 hr prior to exam. Bilirubin is improving, 8.6 from 13.0; AST improving (136->115), with stable ALT (74->71), . Lactate was nl on admission.     Hospital Medications:  acetaminophen     Tablet .. 650 milliGRAM(s) Oral every 6 hours PRN  aluminum hydroxide/magnesium hydroxide/simethicone Suspension 30 milliLiter(s) Oral every 4 hours PRN  chlorhexidine 2% Cloths 1 Application(s) Topical daily  colchicine 0.6 milliGRAM(s) Oral daily  enoxaparin Injectable 40 milliGRAM(s) SubCutaneous daily  folic acid 1 milliGRAM(s) Oral daily  HYDROmorphone  Injectable 3 milliGRAM(s) IV Push every 3 hours PRN  lactated ringers. 1000 milliLiter(s) IV Continuous <Continuous>  lactated ringers. 1000 milliLiter(s) IV Continuous <Continuous>  melatonin 3 milliGRAM(s) Oral at bedtime PRN  ondansetron Injectable 4 milliGRAM(s) IV Push every 6 hours PRN  oxyCODONE    IR 30 milliGRAM(s) Oral every 4 hours PRN  pantoprazole    Tablet 40 milliGRAM(s) Oral before breakfast  polyethylene glycol 3350 17 Gram(s) Oral daily  senna 2 Tablet(s) Oral at bedtime      ROS:   General:  No  fevers, chills  Eyes:  Good vision, no reported pain  ENT:  No sore throat, pain, runny nose  CV:  No pain, palpitations  Pulm:  No dyspnea, cough  GI:  Still reports abdominal pain and points mostly epigastric area, reports loss of appetite, no vomiting, no diarrhea, no blood in stool or melena  :  No  dysuria  Muscle: Still requires q3 hrs Dilaudid  Neuro:  No memory problems  Skin:  Jaundice    PHYSICAL EXAM:   Vital Signs:  Vital Signs Last 24 Hrs  T(C): 37 (04 Mar 2022 12:57), Max: 37.1 (03 Mar 2022 20:30)  T(F): 98.6 (04 Mar 2022 12:57), Max: 98.7 (03 Mar 2022 20:30)  HR: 63 (04 Mar 2022 12:57) (56 - 72)  BP: 144/66 (04 Mar 2022 12:57) (135/75 - 147/70)  BP(mean): --  RR: 16 (04 Mar 2022 12:57) (16 - 18)  SpO2: 100% (04 Mar 2022 12:57) (99% - 100%)  Daily     Daily     GENERAL: no acute distress  NEURO: awake, alert, oriented x3, no asterixis  HEENT: icteric sclera  CHEST: no respiratory distress, no accessory muscle use  CARDIAC: regular rate, rhythm  ABDOMEN: soft, mildly distended, epigastric tenderness, no rebound or guarding, BS+, no or minimal RUQ tenderness  EXTREMITIES: warm, well perfused, no edema  SKIN: Jaundice    LABS: reviewed                        5.9    8.72  )-----------( 214      ( 04 Mar 2022 06:34 )             16.0     03-04    140  |  110<H>  |  13  ----------------------------<  91  3.5   |  26  |  0.60    Ca    8.7      04 Mar 2022 06:34    TPro  6.6  /  Alb  2.9<L>  /  TBili  8.6<H>  /  DBili  4.4<H>  /  AST  115<H>  /  ALT  71<H>  /  AlkPhos  122<H>  03-04    LIVER FUNCTIONS - ( 04 Mar 2022 06:34 )  Alb: 2.9 g/dL / Pro: 6.6 g/dL / ALK PHOS: 122 U/L / ALT: 71 U/L DA / AST: 115 U/L / GGT: x             Interval Diagnostic Studies: see sunrise for full report

## 2022-03-04 NOTE — PROGRESS NOTE ADULT - ATTENDING COMMENTS
40yo M PMHx of Sickle Cell Disease, Gout who presented with abdominal pain and nausea. Patient recently discharged from the hospital for sickle cell crisis in the setting of anemia. The patient reports going to a birthday party over the weekend and afterwards developed severe abdominal pain with nausea and vomiting. Denies sick contacts. Patient reports inability to tolerate any PO intake. Afterwards he began to develop pain in his joints bilaterally. In the ED, Hgb 6.4 at baseline, Lipase wnl, T Bili 8.4 chronically elevated. Patient has epigastric pain    #Sickle Cell Crisis  #RUQ pain with n/v  #Gout  RUQ pain, TTP, Nausea/vomitting, PO intolerance. Elevated direct/indirect bilirubin and GGT - Concern for sickle cell hepatic crisis vs intrahepatic cholestasis. Patient with history of cholecystectomy years ago.   - Abd ultrasound - Negative   - f/u GI consult  - Hgb stable  - Pain control, Dilaudid 3mg q3h PRN for severe pain, resume home oxycodone for moderate pain  - Aggressive IVF hydration  - CLD - advance as tolerated  - Pantoprazole IV  - no Chest pain or shortness of breath, unlikely acute chest syndrome  - f/u hematology
42yo M PMHx of Sickle Cell Disease, Gout who presented with abdominal pain and nausea. Patient recently discharged from the hospital for sickle cell crisis in the setting of anemia. The patient reports going to a birthday party over the weekend and afterwards developed severe abdominal pain with nausea and vomiting. Denies sick contacts. Patient reports inability to tolerate any PO intake. Afterwards he began to develop pain in his joints bilaterally. In the ED, Hgb 6.4 at baseline, Lipase wnl, T Bili 8.4 chronically elevated. Patient has epigastric pain    #Sickle Cell Crisis  #Gastritis with intractable nausea, vomiting  #Gout  - Obtain direct/indirect bilirubin and GGT  - obtain abd ultrasound  - HGb 5.6 - pending 2U pRBC  - Pain control, Dilaudid 3mg q3h PRN for severe pain, resume home oxycodone for moderate pain  - Aggressive IVF hydration  - Advanced to regular diet  - Pantoprazole IV  - no Chest pain or shortness of breath, unlikely acute chest syndrome  - f/u Dr. Jordan - hematology
40yo M PMHx of Sickle Cell Disease, Gout who presented with abdominal pain and nausea. Patient recently discharged from the hospital for sickle cell crisis in the setting of anemia. The patient reports going to a birthday party over the weekend and afterwards developed severe abdominal pain with nausea and vomiting. Denies sick contacts. Patient reports inability to tolerate any PO intake. Afterwards he began to develop pain in his joints bilaterally. In the ED, Hgb 6.4 at baseline, Lipase wnl, T Bili 8.4 chronically elevated. Patient has epigastric pain    #Sickle Cell Crisis  #RUQ pain with n/v  #Gout  RUQ pain, TTP, Nausea/vomitting, PO intolerance. Elevated direct/indirect bilirubin and GGT - Concern for sickle cell hepatic crisis vs intrahepatic cholestasis. Patient with history of cholecystectomy years ago.   - Abd ultrasound - Negative   - f/u  hepatology, GI - labs pending  - Hgb stable  - Pain control, Dilaudid 3mg q3h PRN for severe pain, resume home oxycodone for moderate pain  - continue IVF hydration  -diet advance as tolerated  - Pantoprazole IV  - no Chest pain or shortness of breath, unlikely acute chest syndrome  - f/u hematology .
40yo M PMHx of Sickle Cell Disease, Gout who presented with abdominal pain and nausea. Patient recently discharged from the hospital for sickle cell crisis in the setting of anemia. The patient reports going to a birthday party over the weekend and afterwards developed severe abdominal pain with nausea and vomiting. Denies sick contacts. Patient reports inability to tolerate any PO intake. Afterwards he began to develop pain in his joints bilaterally. In the ED, Hgb 6.4 at baseline, Lipase wnl, T Bili 8.4 chronically elevated. Patient has epigastric pain    #sickle cell hepatic crisis vs intrahepatic cholestasis  #Sickle Cell Crisis  #RUQ pain with n/v  #Gout  RUQ pain, TTP, Nausea/vomitting, PO intolerance. Elevated direct/indirect bilirubin and GGT. Patient with history of cholecystectomy years ago.   - f/u CT A/P w/ IV contrast - continue IVF after IV contrast  - Abd ultrasound - Negative   - f/u  hepatology, GI - labs pending  - Hgb stable  - Pain control, Dilaudid 3mg q3h PRN for severe pain, resume home oxycodone for moderate pain  - continue IVF hydration  -diet advance as tolerated  - Pantoprazole IV  - no Chest pain or shortness of breath, unlikely acute chest syndrome  - f/u hematology

## 2022-03-04 NOTE — DISCHARGE NOTE PROVIDER - CARE PROVIDER_API CALL
Chuck Jordan  INTERNAL MEDICINE  [address]  [place] [id number]  Phone: [phone]  Fax: [phone]  Established Patient  Follow Up Time: 1 week    Sukumar Tristan)  Internal Medicine  [address] [place] [address]  Phone: [phone]  Fax: [phone]  Follow Up Time: 2 weeks

## 2022-03-04 NOTE — PROGRESS NOTE ADULT - PROBLEM SELECTOR PLAN 3
Patient with  abdominal pain , N , V   - lipase normal , tenderness mostly in epigastric area , likely gastritis  - Protonix 40mg PO  - Zofran PRN for nausea   - advance diet as tolerated
Patient has history of Gout  - allopurinol 100mg, colchicine 0.6mg  -hold allopurinol as per Hepatology   - Resumed home meds.  -hep dr. Tristan consulted
Patient with  abdominal pain , N , V   - lipase normal , tenderness mostly in epigastric area , likely gastritis  - Protonix 40mg PO  - Zofran PRN for nausea   - advance diet as tolerated
Patient has history of Gout  - allopurinol 100mg, colchicine 0.6mg  -hold allopurinol as per Hepatology for elevated bilirubin  - Resumed home meds.  -hep dr. Tristan consulted

## 2022-03-04 NOTE — PROGRESS NOTE ADULT - PROBLEM SELECTOR PLAN 1
Patient with abdominal , joint pain and headache 2/2 Sickle Cell Crisis  - Hb 6.4 , reticulocytes 11 ( baseline )  - trending down to 5.6  - will transfuse 1U PRBC   - elevated bilirubin and Ast ( baseline )  - IV fluids ( 0.45 % NS ) @ 125 cc   - pain control Tylenol, oxycodone, Dilaudid for mild , moderate and severe pain.  - oxygen supplementation.  - Hem/onc dr. Jordan consulted  - will hold further transfusion, baseline around 6.  -US abdomen negative for acute findings.  -f/u Abd-pelvis CT w/IV contrast
- Haptoglobin <20,   - f/u 24 hour urine hemosiderin, Abram, Hgb electrophoresis to assess patient's sickle cell and hemolysis  - Patient's Tbili trending down from 10 to 8.6 but seems very high for only hemolysis to be taking place and there is suspicion for underlying liver dysfunction  - f/u Hep A, B, C panel  - Dr Tristan following. Will consideration of liver biopsy for patient in the setting of this patient's suspicious Tbili 13 once Dr Tristan recommend
Patient with abdominal , joint pain and headache 2/2 Sickle Cell Crisis  - Hb 6.4 , reticulocytes 11 ( baseline )  - trending down to 5.6  - will transfuse 1U PRBC   - elevated bilirubin and Ast ( baseline )  - IV fluids ( 0.45 % NS ) @ 125 cc   - pain control Tylenol, oxycodone, Dilaudid for mild , moderate and severe pain.  - oxygen supplementation.  - Hem/onc dr. Jordan consulted  - will hold further transfusion, baseline around 6.  -US abdomen negative for acute findings.
Patient with abdominal , joint pain and headache 2/2 Sickle Cell Crisis  - Hb 6.4 , reticulocytes 11 ( baseline )  - trending down to 5.6  - will transfuse 1U PRBC   - elevated bilirubin and Ast ( baseline )  - IV fluids ( 0.45 % NS ) @ 125 cc   - pain control Tylenol, oxycodone, Dilaudid for mild , moderate and severe pain.  - oxygen supplementation.  - Hem/onc dr. Jordan consulted  - will hold further transfusion, baseline around 6.  -US abdomen negative for acute findings.
- Haptoglobin <20,   - f/u 24 hour urine hemosiderin, Abram, Hgb electrophoresis to assess patient's sickle cell and hemolysis  - Patient's Tbili 10 seems very high for only hemolysis to be taking place and there is suspicion for underlying liver dysfunction  - f/u Hep A, B, C panel  - Dr Tristan following. Will consideration of liver biopsy for patient in the setting of this patient's suspicious Tbili 13 once Dr Tristan recommend
Patient with abdominal , joint pain and headache 2/2 Sickle Cell Crisis  - Hb 6.4 , reticulocytes 11 ( baseline )  - trending down to 5.6  - will transfuse 1U PRBC   - elevated bilirubin and Ast ( baseline )  - IV fluids ( 0.45 % NS ) @ 125 cc   - pain control Tylenol, oxycodone, Dilaudid for mild , moderate and severe pain.  - oxygen supplementation.

## 2022-03-04 NOTE — DISCHARGE NOTE PROVIDER - NSDCMRMEDTOKEN_GEN_ALL_CORE_FT
allopurinol 100 mg oral tablet: orally once a day  colchicine 0.6 mg oral tablet: 1 tab(s) orally once a day  folic acid 1 mg oral tablet: 1 tab(s) orally 2 times a day  oxyCODONE 30 mg oral tablet: 1 tab(s) orally every 4 hours, As needed, Severe Pain (7 - 10)   allopurinol 100 mg oral tablet: orally once a day  colchicine 0.6 mg oral tablet: 1 tab(s) orally once a day  folic acid 1 mg oral tablet: 1 tab(s) orally 2 times a day  oxyCODONE 30 mg oral tablet: 1 tab(s) orally every 4 hours, As needed, Severe Pain (7 - 10)  senna oral tablet: 2 tab(s) orally once a day (at bedtime)

## 2022-03-04 NOTE — DISCHARGE NOTE PROVIDER - ATTENDING DISCHARGE PHYSICAL EXAMINATION:
42yo M PMHx of Sickle Cell Disease, Gout who presented with abdominal pain and nausea. Admit for sickle cell hepatic crisis, intrahepatic cholestasis, Sickle Cell Crisis, acute on chronic anemia. Elevated bilirubin improved with IVF. Patient is able to tolerate PO. Patient given 1U pRBC, Hgb at baseline. Abdominal ultrasound negative. Stable for discharge.     PHYSICAL EXAM:  GENERAL: NAD, well-developed  HEAD:  Atraumatic, Normocephalic  EYES: conjunctiva and sclera clear  NECK: Supple, No JVD  CHEST/LUNG: Clear to auscultation bilaterally; No wheeze  HEART: Regular rate and rhythm; No murmurs, rubs, or gallops  ABDOMEN: Soft, Nontender, Nondistended; Bowel sounds present  EXTREMITIES:  No clubbing, cyanosis, or edema  PSYCH: AAOx3  NEUROLOGY: non-focal  SKIN: No rashes or lesions

## 2022-03-04 NOTE — DISCHARGE NOTE PROVIDER - PROVIDER TOKENS
PROVIDER:[TOKEN:[[id number]],FOLLOWUP:[1 week],ESTABLISHEDPATIENT:[T]],PROVIDER:[TOKEN:[[id number]],FOLLOWUP:[2 weeks]]

## 2022-03-04 NOTE — PROGRESS NOTE ADULT - ASSESSMENT
· Assessment	  TOLU VARGAS is a 41y Male who presents with a chief complaint of sickle cell crisis.    Sickle Cell Disease  - Patient with known sickle cell disease. Baseline hemoglobin of around 5-6.  - Hold further transfusions unless patient has symptomatic anemia given he is above his baseline at this time. Received 1U PRBC on 3/1  - neg abdominal ultrasound.   - Continue pain control and IVF hydration.  - Advance diet as tolerated.  - Patient had been unable to tolerate hydroxyurea in the past. Continue folic acid supplementation.    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 D/w pt.  On discharge, to follow-up with Dr. Shirley Jordan.

## 2022-03-04 NOTE — PROGRESS NOTE ADULT - ASSESSMENT
Patient is a 42 y/o male, from home, with significant medical history of Sickle Cell Disease (follows with Dr. Jordan OP), and Gout who presents with abdominal pain, nausea, and vomiting. He is a longstanding SCD patient with multiple crises and RBCs transfusion received since the last 6 months, Patient has a baseline hemoglobin 5.5g/dl.  Patient admitted to medicine for Sickle cell crisis with lower back pain and was started on pain management with IV Dilaudid, Oxycodone and IVF. Patient has right chest Mediport accessed in ED for medication and IVF and is following with dr. Jordan as outpatient. Patient also found to be anemic with Hgb 5.6 and was ordered 1U PRBC. Abdominal US negative for acute findings, hem/onc consulted, patient is known to dr. Jordan, will hold further transfusion as pt baseline is around 6, continue with  pain management and IVF. Hepatology note appreciated, will hold Allopurinol and monitor INR, BMP and hepatic panel qd.    3/4- pt seen and examined at bedside, scheduled for abdominal CT w/IV contrast.

## 2022-03-04 NOTE — DISCHARGE NOTE PROVIDER - HOSPITAL COURSE
Patient is a 40 y/o male, from home, with significant medical history of Sickle Cell Disease (follows with Dr. Jordan OP), and Gout who presents with abdominal pain, nausea, and vomiting. He is a longstanding SCD patient with multiple crises and RBCs transfusion received since the last 6 months, Patient has a baseline hemoglobin 5.5g/dl.  Patient admitted to medicine for Sickle cell crisis with lower back pain and was started on pain management with IV Dilaudid, Oxycodone and IVF. Patient has right chest Mediport accessed in ED for medication and IVF and is following with dr. Jordan as outpatient. Patient also found to be anemic with Hgb 5.6 and was ordered 1U PRBC. Abdominal US negative for acute findings, hem/onc consulted, patient is known to dr. Jordan, will hold further transfusion as pt baseline is around 6, continue with  pain management and IVF. Hepatology note appreciated, will hold Allopurinol and monitor INR, BMP and hepatic panel.      Given patient's improved clinical status and current hemodynamic stability, decision was made to discharge.  Please refer to patient's complete medical chart with documents for a full hospital course, for this is only a brief summary.       Patient is a 40 y/o male, from home, with significant medical history of Sickle Cell Disease (follows with Dr. Jordan OP), and Gout who presents with abdominal pain, nausea, and   vomiting. He is a longstanding SCD patient with multiple crises and RBCs transfusion received since the last 6 months, Patient has a baseline hemoglobin 5.5g/dl.  Patient admitted   to medicine for Sickle cell crisis with lower back pain and was started on pain management with IV Dilaudid, Oxycodone and IVF. Patient has right chest Mediport accessed in ED for medication and IVF and is following with dr. Jordan as outpatient. Patient also found to be anemic with Hgb 5.6 and received 1U PRBC. Abdominal US negative for acute findings,   hem/onc consulted, patient is known to dr. Jordan, will hold further transfusion as pt baseline is around 6, continue with  pain management and IVF. Hepatology note appreciated, will   hold Allopurinol and monitor INR, BMP and hepatic panel.      Given patient's improved clinical status and current hemodynamic stability, decision was made to discharge.  Please refer to patient's complete medical chart with documents for a full hospital course, for this is only a brief summary.

## 2022-03-05 LAB
ALBUMIN SERPL ELPH-MCNC: 3.1 G/DL — LOW (ref 3.5–5)
ALP SERPL-CCNC: 121 U/L — HIGH (ref 40–120)
ALT FLD-CCNC: 67 U/L DA — HIGH (ref 10–60)
ANION GAP SERPL CALC-SCNC: 6 MMOL/L — SIGNIFICANT CHANGE UP (ref 5–17)
AST SERPL-CCNC: 95 U/L — HIGH (ref 10–40)
BILIRUB DIRECT SERPL-MCNC: 3.3 MG/DL — HIGH (ref 0–0.3)
BILIRUB INDIRECT FLD-MCNC: 3.5 MG/DL — HIGH (ref 0.2–1)
BILIRUB SERPL-MCNC: 6.8 MG/DL — HIGH (ref 0.2–1.2)
BLD GP AB SCN SERPL QL: SIGNIFICANT CHANGE UP
BUN SERPL-MCNC: 13 MG/DL — SIGNIFICANT CHANGE UP (ref 7–18)
CALCIUM SERPL-MCNC: 8.9 MG/DL — SIGNIFICANT CHANGE UP (ref 8.4–10.5)
CHLORIDE SERPL-SCNC: 107 MMOL/L — SIGNIFICANT CHANGE UP (ref 96–108)
CO2 SERPL-SCNC: 28 MMOL/L — SIGNIFICANT CHANGE UP (ref 22–31)
CREAT SERPL-MCNC: 0.64 MG/DL — SIGNIFICANT CHANGE UP (ref 0.5–1.3)
EGFR: 121 ML/MIN/1.73M2 — SIGNIFICANT CHANGE UP
GLUCOSE SERPL-MCNC: 88 MG/DL — SIGNIFICANT CHANGE UP (ref 70–99)
HCT VFR BLD CALC: 16.2 % — CRITICAL LOW (ref 39–50)
HGB BLD-MCNC: 5.7 G/DL — CRITICAL LOW (ref 13–17)
MCHC RBC-ENTMCNC: 31.3 PG — SIGNIFICANT CHANGE UP (ref 27–34)
MCHC RBC-ENTMCNC: 35.2 GM/DL — SIGNIFICANT CHANGE UP (ref 32–36)
MCV RBC AUTO: 89 FL — SIGNIFICANT CHANGE UP (ref 80–100)
NRBC # BLD: 0 /100 WBCS — SIGNIFICANT CHANGE UP (ref 0–0)
PLATELET # BLD AUTO: 215 K/UL — SIGNIFICANT CHANGE UP (ref 150–400)
POTASSIUM SERPL-MCNC: 3.6 MMOL/L — SIGNIFICANT CHANGE UP (ref 3.5–5.3)
POTASSIUM SERPL-SCNC: 3.6 MMOL/L — SIGNIFICANT CHANGE UP (ref 3.5–5.3)
PROT SERPL-MCNC: 6.6 G/DL — SIGNIFICANT CHANGE UP (ref 6–8.3)
RBC # BLD: 1.82 M/UL — LOW (ref 4.2–5.8)
RBC # FLD: 18.5 % — HIGH (ref 10.3–14.5)
SODIUM SERPL-SCNC: 141 MMOL/L — SIGNIFICANT CHANGE UP (ref 135–145)
WBC # BLD: 9.96 K/UL — SIGNIFICANT CHANGE UP (ref 3.8–10.5)
WBC # FLD AUTO: 9.96 K/UL — SIGNIFICANT CHANGE UP (ref 3.8–10.5)

## 2022-03-05 PROCEDURE — 99221 1ST HOSP IP/OBS SF/LOW 40: CPT

## 2022-03-05 PROCEDURE — 99233 SBSQ HOSP IP/OBS HIGH 50: CPT

## 2022-03-05 RX ADMIN — SODIUM CHLORIDE 100 MILLILITER(S): 9 INJECTION, SOLUTION INTRAVENOUS at 09:56

## 2022-03-05 RX ADMIN — HYDROMORPHONE HYDROCHLORIDE 3 MILLIGRAM(S): 2 INJECTION INTRAMUSCULAR; INTRAVENOUS; SUBCUTANEOUS at 03:30

## 2022-03-05 RX ADMIN — HYDROMORPHONE HYDROCHLORIDE 3 MILLIGRAM(S): 2 INJECTION INTRAMUSCULAR; INTRAVENOUS; SUBCUTANEOUS at 13:02

## 2022-03-05 RX ADMIN — HYDROMORPHONE HYDROCHLORIDE 3 MILLIGRAM(S): 2 INJECTION INTRAMUSCULAR; INTRAVENOUS; SUBCUTANEOUS at 07:00

## 2022-03-05 RX ADMIN — ONDANSETRON 4 MILLIGRAM(S): 8 TABLET, FILM COATED ORAL at 01:16

## 2022-03-05 RX ADMIN — HYDROMORPHONE HYDROCHLORIDE 3 MILLIGRAM(S): 2 INJECTION INTRAMUSCULAR; INTRAVENOUS; SUBCUTANEOUS at 09:55

## 2022-03-05 RX ADMIN — HYDROMORPHONE HYDROCHLORIDE 3 MILLIGRAM(S): 2 INJECTION INTRAMUSCULAR; INTRAVENOUS; SUBCUTANEOUS at 16:18

## 2022-03-05 RX ADMIN — Medication 1 MILLIGRAM(S): at 11:54

## 2022-03-05 RX ADMIN — HYDROMORPHONE HYDROCHLORIDE 3 MILLIGRAM(S): 2 INJECTION INTRAMUSCULAR; INTRAVENOUS; SUBCUTANEOUS at 19:30

## 2022-03-05 RX ADMIN — PANTOPRAZOLE SODIUM 40 MILLIGRAM(S): 20 TABLET, DELAYED RELEASE ORAL at 07:09

## 2022-03-05 RX ADMIN — ENOXAPARIN SODIUM 40 MILLIGRAM(S): 100 INJECTION SUBCUTANEOUS at 11:54

## 2022-03-05 RX ADMIN — CHLORHEXIDINE GLUCONATE 1 APPLICATION(S): 213 SOLUTION TOPICAL at 11:54

## 2022-03-05 RX ADMIN — HYDROMORPHONE HYDROCHLORIDE 3 MILLIGRAM(S): 2 INJECTION INTRAMUSCULAR; INTRAVENOUS; SUBCUTANEOUS at 00:41

## 2022-03-05 RX ADMIN — HYDROMORPHONE HYDROCHLORIDE 3 MILLIGRAM(S): 2 INJECTION INTRAMUSCULAR; INTRAVENOUS; SUBCUTANEOUS at 06:30

## 2022-03-05 RX ADMIN — HYDROMORPHONE HYDROCHLORIDE 3 MILLIGRAM(S): 2 INJECTION INTRAMUSCULAR; INTRAVENOUS; SUBCUTANEOUS at 04:00

## 2022-03-05 RX ADMIN — Medication 0.6 MILLIGRAM(S): at 11:54

## 2022-03-05 RX ADMIN — POLYETHYLENE GLYCOL 3350 17 GRAM(S): 17 POWDER, FOR SOLUTION ORAL at 00:17

## 2022-03-05 RX ADMIN — HYDROMORPHONE HYDROCHLORIDE 3 MILLIGRAM(S): 2 INJECTION INTRAMUSCULAR; INTRAVENOUS; SUBCUTANEOUS at 22:35

## 2022-03-05 RX ADMIN — HYDROMORPHONE HYDROCHLORIDE 3 MILLIGRAM(S): 2 INJECTION INTRAMUSCULAR; INTRAVENOUS; SUBCUTANEOUS at 00:11

## 2022-03-05 RX ADMIN — HYDROMORPHONE HYDROCHLORIDE 3 MILLIGRAM(S): 2 INJECTION INTRAMUSCULAR; INTRAVENOUS; SUBCUTANEOUS at 16:48

## 2022-03-05 RX ADMIN — HYDROMORPHONE HYDROCHLORIDE 3 MILLIGRAM(S): 2 INJECTION INTRAMUSCULAR; INTRAVENOUS; SUBCUTANEOUS at 10:25

## 2022-03-05 RX ADMIN — SODIUM CHLORIDE 90 MILLILITER(S): 9 INJECTION, SOLUTION INTRAVENOUS at 09:55

## 2022-03-05 RX ADMIN — ONDANSETRON 4 MILLIGRAM(S): 8 TABLET, FILM COATED ORAL at 08:04

## 2022-03-05 RX ADMIN — SENNA PLUS 2 TABLET(S): 8.6 TABLET ORAL at 00:11

## 2022-03-05 RX ADMIN — SODIUM CHLORIDE 100 MILLILITER(S): 9 INJECTION, SOLUTION INTRAVENOUS at 00:11

## 2022-03-05 RX ADMIN — HYDROMORPHONE HYDROCHLORIDE 3 MILLIGRAM(S): 2 INJECTION INTRAMUSCULAR; INTRAVENOUS; SUBCUTANEOUS at 20:00

## 2022-03-05 RX ADMIN — HYDROMORPHONE HYDROCHLORIDE 3 MILLIGRAM(S): 2 INJECTION INTRAMUSCULAR; INTRAVENOUS; SUBCUTANEOUS at 13:32

## 2022-03-05 RX ADMIN — SENNA PLUS 2 TABLET(S): 8.6 TABLET ORAL at 21:26

## 2022-03-05 RX ADMIN — HYDROMORPHONE HYDROCHLORIDE 3 MILLIGRAM(S): 2 INJECTION INTRAMUSCULAR; INTRAVENOUS; SUBCUTANEOUS at 23:05

## 2022-03-05 RX ADMIN — ONDANSETRON 4 MILLIGRAM(S): 8 TABLET, FILM COATED ORAL at 15:52

## 2022-03-05 NOTE — PROGRESS NOTE ADULT - SUBJECTIVE AND OBJECTIVE BOX
[facility]  Patient is a 41y old  Male who presents with a chief complaint of Sickle Cell Crisis (05 Mar 2022 13:18)      SUBJECTIVE / OVERNIGHT EVENTS:  No acute events over night. Patient with mild improvement of abdominal pain. Patient is able to tolerate diet now. Denies any fevers/chills, headache, CP, SOB, N/V/D, constipation, or leg swelling.       MEDICATIONS  (STANDING):  chlorhexidine 2% Cloths 1 Application(s) Topical daily  colchicine 0.6 milliGRAM(s) Oral daily  enoxaparin Injectable 40 milliGRAM(s) SubCutaneous daily  folic acid 1 milliGRAM(s) Oral daily  lactated ringers. 1000 milliLiter(s) (100 mL/Hr) IV Continuous <Continuous>  pantoprazole    Tablet 40 milliGRAM(s) Oral before breakfast  polyethylene glycol 3350 17 Gram(s) Oral daily  senna 2 Tablet(s) Oral at bedtime    MEDICATIONS  (PRN):  acetaminophen     Tablet .. 650 milliGRAM(s) Oral every 6 hours PRN Temp greater or equal to 38C (100.4F), Mild Pain (1 - 3)  aluminum hydroxide/magnesium hydroxide/simethicone Suspension 30 milliLiter(s) Oral every 4 hours PRN Dyspepsia  HYDROmorphone  Injectable 3 milliGRAM(s) IV Push every 3 hours PRN Severe Pain (7 - 10)  melatonin 3 milliGRAM(s) Oral at bedtime PRN Insomnia  ondansetron Injectable 4 milliGRAM(s) IV Push every 6 hours PRN Nausea and/or Vomiting  oxyCODONE    IR 30 milliGRAM(s) Oral every 4 hours PRN Moderate Pain (4 - 6)          OBJECTIVE:  Vital Signs Last 24 Hrs  T(C): 36.7 (05 Mar 2022 12:31), Max: 37.1 (04 Mar 2022 20:46)  T(F): 98 (05 Mar 2022 12:31), Max: 98.8 (04 Mar 2022 20:46)  HR: 77 (05 Mar 2022 12:31) (60 - 80)  BP: 148/81 (05 Mar 2022 12:31) (130/78 - 148/81)  BP(mean): 93 (05 Mar 2022 03:20) (88 - 93)  RR: 17 (05 Mar 2022 12:31) (17 - 18)  SpO2: 94% (05 Mar 2022 12:31) (94% - 100%)    PHYSICAL EXAM:  GENERAL: NAD, well-developed  HEAD:  Atraumatic, Normocephalic  EYES: conjunctiva and sclera clear  NECK: Supple, No JVD  CHEST/LUNG: Clear to auscultation bilaterally; No wheeze  HEART: Regular rate and rhythm; No murmurs, rubs, or gallops  ABDOMEN: Soft, Nontender, Nondistended; Bowel sounds present  EXTREMITIES:  No clubbing, cyanosis, or edema  PSYCH: AAOx3  NEUROLOGY: non-focal  SKIN: No rashes or lesions    CAPILLARY BLOOD GLUCOSE        I&O's Summary            LABS:                        5.7    9.96  )-----------( 215      ( 05 Mar 2022 07:16 )             16.2     03-05    141  |  107  |  13  ----------------------------<  88  3.6   |  28  |  0.64    Ca    8.9      05 Mar 2022 07:16    TPro  6.6  /  Alb  3.1<L>  /  TBili  6.8<H>  /  DBili  3.3<H>  /  AST  95<H>  /  ALT  67<H>  /  AlkPhos  121<H>  03-05    PT/INR - ( 04 Mar 2022 06:34 )   PT: 13.7 sec;   INR: 1.15 ratio         PTT - ( 04 Mar 2022 06:34 )  PTT:29.2 sec              RADIOLOGY & ADDITIONAL TESTS:

## 2022-03-05 NOTE — PROGRESS NOTE ADULT - ASSESSMENT
42yo M PMHx of Sickle Cell Disease, Gout who presented with abdominal pain and nausea. Patient recently discharged from the hospital for sickle cell crisis in the setting of anemia. The patient reports going to a birthday party over the weekend and afterwards developed severe abdominal pain with nausea and vomiting. Denies sick contacts. Patient reports inability to tolerate any PO intake. Afterwards he began to develop pain in his joints bilaterally. In the ED, Hgb 6.4 at baseline, Lipase wnl, T Bili 8.4 chronically elevated. Patient has epigastric pain    #sickle cell hepatic crisis vs intrahepatic cholestasis  #Sickle Cell Crisis  #RUQ pain with n/v  #Gout  RUQ pain, TTP, Nausea/vomitting, PO intolerance. Elevated direct/indirect bilirubin and GGT. Patient with history of cholecystectomy years ago.   - CT A/P Dilated retrocecal appendix, with small amount of free fluid within the right upper quadrant hepatorenal space - f/u surgery   - Abd ultrasound - Negative   - f/u  hepatology, GI - labs pending  - Hgb stable  - Pain control, Dilaudid 3mg q3h PRN for severe pain, resume home oxycodone for moderate pain  - continue IVF hydration  -diet advance as tolerated  - Pantoprazole IV  - no Chest pain or shortness of breath, unlikely acute chest syndrome  - f/u hematology

## 2022-03-05 NOTE — CHART NOTE - NSCHARTNOTEFT_GEN_A_CORE
Notified of critical blood value Hemoglobin: 5.7: TYPE:(C=Critical, N=Notification, A=Abnormal) C Hematocrit: 16.2: TYPE:(C=Critical, N=Notification, A=Abnormal) C. Patient seen at bedside, no complaints. VSS.  The lab values are stable.  Discussed with Dr. Jordan, will get type and screen, but no transfusion now.  Repeat CBC in am.

## 2022-03-05 NOTE — CONSULT NOTE ADULT - SUBJECTIVE AND OBJECTIVE BOX
HPI:  Patient is a 42 y/o male, from home, with significant medical history of Sickle Cell Disease (follows with Dr. Jordan OP), and Gout who presents with abdominal pain, nausea, and vomiting. He is a longstanding SCD patient with multiple crises and RBCs transfusion received since the last 6 months,Patient has a baseline hemoglobin 5.5g/dl.   Pt was  admitted to [facility] 2/24-2/26 for sickle cell crisis when he was found to be anemic to 4.4 and was transfused.  Pt was subsequently discharged.  Yesterday, the patient was home and it was his sisters birthday.  He ate pasta and cake then had severe abdominal pain,  nausea and vomiting began after.  He also reports headache and joint pain .   patient denies any dizziness,  palpitations, shortness of breath,  urinary symptoms or any other acute complaint.   (28 Feb 2022 11:24)  Hematology following as well as hepatology  prbcs as per heme   hepatology following for ^LFTS    Surg called for pt c/o epigastric/RUQ pain  US unremarkable  < from: US Abdomen Complete (US Abdomen Complete .) (03.02.22 @ 10:01) >    IMPRESSION:  Status post cholecystectomy; otherwise, unremarkable right upper quadrant   abdominal ultrasound.      < end of copied text >  < from: CT Abdomen and Pelvis w/ IV Cont (03.04.22 @ 22:51) >  FINDINGS:    LOWER CHEST:  Small bilateral pleural effusions and underlying compressive atelectasis.  Mild bibasilar bronchiectasis with subpleural architectural   distortion/fibrosis.  Small right epiphrenic lymph nodes.    LIVER: Mild periportal edema.  BILE DUCTS: Normal caliber.  GALLBLADDER: Cholecystectomy.  SPLEEN: Shrunken calcified spleen.  PANCREAS: Within normal limits.  ADRENALS: Within normal limits.  KIDNEYS/URETERS:  Bilateral perinephric stranding.  No hydronephrosis.  1.8 cm cyst interpolar left kidney.  Small, indeterminate hypodense lesion posterior lower pole left kidney.    BLADDER: Within normal limits.  REPRODUCTIVE ORGANS: Prostate not enlarged.    BOWEL: No bowel obstruction.  There is a dilated retrocecal appendix, measuring up to 9 mm, with the   appendix extending superiorly towards the subhepatic space. There is   small amount of fluid and stranding within the right upper quadrant   hepatorenal space.  PERITONEUM: Small amount of free fluid within the right upper quadrant   hepatorenal space.  9 mm soft tissue nodule right upper quadrant adjacent to the hepatic   flexure, may reflect enlarged lymph node.    VESSELS: Atherosclerotic changes.  RETROPERITONEUM/LYMPH NODES:  Small mesenteric lymph nodes.  Small subcentimeter short axis bilateral external iliac chain pelvic   lymph nodes.    ABDOMINAL WALL: Small fat-containing left inguinal hernia.  Small clusters of bilateral inguinal lymph nodes.    BONES: Age indeterminate compression deformities T12 and L2 vertebral   bodies.  Schmorl's nodes superior endplate L5 vertebral body.    IMPRESSION:    Bilateral perinephric stranding, correlate clinically for urinary tract   infection/pyelonephritis.    Dilated retrocecal appendix, with small amount of free fluid within the   right upper quadrant hepatorenal space.  Recommend further clinical correlation for acute appendicitis.      < end of copied text >      PAST MEDICAL & SURGICAL HISTORY:  Sickle cell anemia    Gout    History of cholecystectomy        Vital Signs Last 24 Hrs  T(C): 36.7 (05 Mar 2022 12:31), Max: 37.1 (04 Mar 2022 20:46)  T(F): 98 (05 Mar 2022 12:31), Max: 98.8 (04 Mar 2022 20:46)  HR: 77 (05 Mar 2022 12:31) (60 - 80)  BP: 148/81 (05 Mar 2022 12:31) (130/78 - 148/81)  BP(mean): 93 (05 Mar 2022 03:20) (88 - 93)  RR: 17 (05 Mar 2022 12:31) (17 - 18)  SpO2: 94% (05 Mar 2022 12:31) (94% - 100%)                          5.7    9.96  )-----------( 215      ( 05 Mar 2022 07:16 )             16.2     03-05    141  |  107  |  13  ----------------------------<  88  3.6   |  28  |  0.64    Ca    8.9      05 Mar 2022 07:16    TPro  6.6  /  Alb  3.1<L>  /  TBili  6.8<H>  /  DBili  3.3<H>  /  AST  95<H>  /  ALT  67<H>  /  AlkPhos  121<H>  03-05    PT/INR - ( 04 Mar 2022 06:34 )   PT: 13.7 sec;   INR: 1.15 ratio         PTT - ( 04 Mar 2022 06:34 )  PTT:29.2 sec    PHYSICAL EXAM  General: WN/WD NAD  Neurology: A&Ox3, nonfocal, RICHARDSON x 4  Respiratory: CTA B/L  CV: RRR, S1S2, no murmurs, rubs or gallops  Abdominal: Soft, mild RUQ/epigastric pain, no rebound or guarding  Extremities: No edema, + peripheral pulses        ASSESSMENT/ PLAN:   HPI:  Patient is a 40 y/o male, from home, with significant medical history of Sickle Cell Disease (follows with Dr. Jordan OP), and Gout who presents with abdominal pain, nausea, and vomiting. He is a longstanding SCD patient with multiple crises and RBCs transfusion received since the last 6 months,Patient has a baseline hemoglobin 5.5g/dl.   Pt was  admitted to [facility] 2/24-2/26 for sickle cell crisis when he was found to be anemic to 4.4 and was transfused.  Pt was subsequently discharged.  Yesterday, the patient was home and it was his sisters birthday.  He ate pasta and cake then had severe abdominal pain,  nausea and vomiting began after.  He also reports headache and joint pain .   patient denies any dizziness,  palpitations, shortness of breath,  urinary symptoms or any other acute complaint.   (28 Feb 2022 11:24)  Hematology following as well as hepatology  prbcs as per heme   hepatology following for ^LFTS    Surg called for pt c/o epigastric/RUQ pain  US unremarkable  < from: US Abdomen Complete (US Abdomen Complete .) (03.02.22 @ 10:01) >    IMPRESSION:  Status post cholecystectomy; otherwise, unremarkable right upper quadrant   abdominal ultrasound.      < end of copied text >  < from: CT Abdomen and Pelvis w/ IV Cont (03.04.22 @ 22:51) >  FINDINGS:    LOWER CHEST:  Small bilateral pleural effusions and underlying compressive atelectasis.  Mild bibasilar bronchiectasis with subpleural architectural   distortion/fibrosis.  Small right epiphrenic lymph nodes.    LIVER: Mild periportal edema.  BILE DUCTS: Normal caliber.  GALLBLADDER: Cholecystectomy.  SPLEEN: Shrunken calcified spleen.  PANCREAS: Within normal limits.  ADRENALS: Within normal limits.  KIDNEYS/URETERS:  Bilateral perinephric stranding.  No hydronephrosis.  1.8 cm cyst interpolar left kidney.  Small, indeterminate hypodense lesion posterior lower pole left kidney.    BLADDER: Within normal limits.  REPRODUCTIVE ORGANS: Prostate not enlarged.    BOWEL: No bowel obstruction.  There is a dilated retrocecal appendix, measuring up to 9 mm, with the   appendix extending superiorly towards the subhepatic space. There is   small amount of fluid and stranding within the right upper quadrant   hepatorenal space.  PERITONEUM: Small amount of free fluid within the right upper quadrant   hepatorenal space.  9 mm soft tissue nodule right upper quadrant adjacent to the hepatic   flexure, may reflect enlarged lymph node.    VESSELS: Atherosclerotic changes.  RETROPERITONEUM/LYMPH NODES:  Small mesenteric lymph nodes.  Small subcentimeter short axis bilateral external iliac chain pelvic   lymph nodes.    ABDOMINAL WALL: Small fat-containing left inguinal hernia.  Small clusters of bilateral inguinal lymph nodes.    BONES: Age indeterminate compression deformities T12 and L2 vertebral   bodies.  Schmorl's nodes superior endplate L5 vertebral body.    IMPRESSION:    Bilateral perinephric stranding, correlate clinically for urinary tract   infection/pyelonephritis.    Dilated retrocecal appendix, with small amount of free fluid within the   right upper quadrant hepatorenal space.  Recommend further clinical correlation for acute appendicitis.      < end of copied text >      PAST MEDICAL & SURGICAL HISTORY:  Sickle cell anemia    Gout    History of cholecystectomy        Vital Signs Last 24 Hrs  T(C): 36.7 (05 Mar 2022 12:31), Max: 37.1 (04 Mar 2022 20:46)  T(F): 98 (05 Mar 2022 12:31), Max: 98.8 (04 Mar 2022 20:46)  HR: 77 (05 Mar 2022 12:31) (60 - 80)  BP: 148/81 (05 Mar 2022 12:31) (130/78 - 148/81)  BP(mean): 93 (05 Mar 2022 03:20) (88 - 93)  RR: 17 (05 Mar 2022 12:31) (17 - 18)  SpO2: 94% (05 Mar 2022 12:31) (94% - 100%)                          5.7    9.96  )-----------( 215      ( 05 Mar 2022 07:16 )             16.2     03-05    141  |  107  |  13  ----------------------------<  88  3.6   |  28  |  0.64    Ca    8.9      05 Mar 2022 07:16    TPro  6.6  /  Alb  3.1<L>  /  TBili  6.8<H>  /  DBili  3.3<H>  /  AST  95<H>  /  ALT  67<H>  /  AlkPhos  121<H>  03-05    PT/INR - ( 04 Mar 2022 06:34 )   PT: 13.7 sec;   INR: 1.15 ratio         PTT - ( 04 Mar 2022 06:34 )  PTT:29.2 sec    PHYSICAL EXAM  General: WN/WD NAD  Neurology: A&Ox3, nonfocal, RICHARDSON x 4  Respiratory: CTA B/L  CV: RRR, S1S2, no murmurs, rubs or gallops  Abdominal: Soft, mild RUQ/epigastric pain, no rebound or guarding  Extremities: No edema, + peripheral pulses        ASSESSMENT/ PLAN:   40 y/o male, from home, with significant medical history of Sickle Cell Disease (follows with Dr. Jordan OP), and Gout who presents with abdominal pain, nausea, and vomiting. He is a longstanding SCD patient with multiple crises and RBCs transfusion received since the last 6 months,Patient has a baseline hemoglobin 5.5g/dl.   Pt was  admitted to [facility] 2/24-2/26 for sickle cell crisis when he was found to be anemic to 4.4 and was transfused.  Pt was subsequently discharged.  Yesterday, the patient was home and it was his sisters birthday.  He ate pasta and cake then had severe abdominal pain,  nausea and vomiting began after.  He also reports headache and joint pain .   patient denies any dizziness,  palpitations, shortness of breath,  urinary symptoms or any other acute complaint.    Pt seen and examined with Dr Banuelos  CT without any def evidence appendicitis, pt without RLQ pain, more epigastric/RUQ  no leukocytosis or fever  no acute surgical intervention at this time  given h/o cholecystectomy and elevated LFTs may consider MRCP, r/o obstructive dx

## 2022-03-06 LAB
ALBUMIN SERPL ELPH-MCNC: 2.9 G/DL — LOW (ref 3.5–5)
ALP SERPL-CCNC: 120 U/L — SIGNIFICANT CHANGE UP (ref 40–120)
ALT FLD-CCNC: 60 U/L DA — SIGNIFICANT CHANGE UP (ref 10–60)
ANION GAP SERPL CALC-SCNC: 5 MMOL/L — SIGNIFICANT CHANGE UP (ref 5–17)
AST SERPL-CCNC: 82 U/L — HIGH (ref 10–40)
BILIRUB SERPL-MCNC: 6.3 MG/DL — HIGH (ref 0.2–1.2)
BUN SERPL-MCNC: 9 MG/DL — SIGNIFICANT CHANGE UP (ref 7–18)
CALCIUM SERPL-MCNC: 8.7 MG/DL — SIGNIFICANT CHANGE UP (ref 8.4–10.5)
CHLORIDE SERPL-SCNC: 108 MMOL/L — SIGNIFICANT CHANGE UP (ref 96–108)
CO2 SERPL-SCNC: 28 MMOL/L — SIGNIFICANT CHANGE UP (ref 22–31)
CREAT SERPL-MCNC: 0.63 MG/DL — SIGNIFICANT CHANGE UP (ref 0.5–1.3)
EGFR: 122 ML/MIN/1.73M2 — SIGNIFICANT CHANGE UP
GLUCOSE SERPL-MCNC: 90 MG/DL — SIGNIFICANT CHANGE UP (ref 70–99)
HCT VFR BLD CALC: 15.9 % — CRITICAL LOW (ref 39–50)
HGB BLD-MCNC: 5.6 G/DL — CRITICAL LOW (ref 13–17)
MCHC RBC-ENTMCNC: 31.8 PG — SIGNIFICANT CHANGE UP (ref 27–34)
MCHC RBC-ENTMCNC: 35.2 GM/DL — SIGNIFICANT CHANGE UP (ref 32–36)
MCV RBC AUTO: 90.3 FL — SIGNIFICANT CHANGE UP (ref 80–100)
NRBC # BLD: 0 /100 WBCS — SIGNIFICANT CHANGE UP (ref 0–0)
PLATELET # BLD AUTO: 225 K/UL — SIGNIFICANT CHANGE UP (ref 150–400)
POTASSIUM SERPL-MCNC: 3.5 MMOL/L — SIGNIFICANT CHANGE UP (ref 3.5–5.3)
POTASSIUM SERPL-SCNC: 3.5 MMOL/L — SIGNIFICANT CHANGE UP (ref 3.5–5.3)
PROT SERPL-MCNC: 6.6 G/DL — SIGNIFICANT CHANGE UP (ref 6–8.3)
RBC # BLD: 1.76 M/UL — LOW (ref 4.2–5.8)
RBC # FLD: 18.1 % — HIGH (ref 10.3–14.5)
SODIUM SERPL-SCNC: 141 MMOL/L — SIGNIFICANT CHANGE UP (ref 135–145)
WBC # BLD: 11.34 K/UL — HIGH (ref 3.8–10.5)
WBC # FLD AUTO: 11.34 K/UL — HIGH (ref 3.8–10.5)

## 2022-03-06 PROCEDURE — 99233 SBSQ HOSP IP/OBS HIGH 50: CPT

## 2022-03-06 PROCEDURE — 99232 SBSQ HOSP IP/OBS MODERATE 35: CPT

## 2022-03-06 RX ADMIN — OXYCODONE HYDROCHLORIDE 30 MILLIGRAM(S): 5 TABLET ORAL at 22:04

## 2022-03-06 RX ADMIN — HYDROMORPHONE HYDROCHLORIDE 3 MILLIGRAM(S): 2 INJECTION INTRAMUSCULAR; INTRAVENOUS; SUBCUTANEOUS at 15:15

## 2022-03-06 RX ADMIN — HYDROMORPHONE HYDROCHLORIDE 3 MILLIGRAM(S): 2 INJECTION INTRAMUSCULAR; INTRAVENOUS; SUBCUTANEOUS at 05:22

## 2022-03-06 RX ADMIN — HYDROMORPHONE HYDROCHLORIDE 3 MILLIGRAM(S): 2 INJECTION INTRAMUSCULAR; INTRAVENOUS; SUBCUTANEOUS at 11:29

## 2022-03-06 RX ADMIN — CHLORHEXIDINE GLUCONATE 1 APPLICATION(S): 213 SOLUTION TOPICAL at 11:26

## 2022-03-06 RX ADMIN — HYDROMORPHONE HYDROCHLORIDE 3 MILLIGRAM(S): 2 INJECTION INTRAMUSCULAR; INTRAVENOUS; SUBCUTANEOUS at 08:23

## 2022-03-06 RX ADMIN — ONDANSETRON 4 MILLIGRAM(S): 8 TABLET, FILM COATED ORAL at 01:43

## 2022-03-06 RX ADMIN — Medication 650 MILLIGRAM(S): at 15:15

## 2022-03-06 RX ADMIN — HYDROMORPHONE HYDROCHLORIDE 3 MILLIGRAM(S): 2 INJECTION INTRAMUSCULAR; INTRAVENOUS; SUBCUTANEOUS at 01:36

## 2022-03-06 RX ADMIN — HYDROMORPHONE HYDROCHLORIDE 3 MILLIGRAM(S): 2 INJECTION INTRAMUSCULAR; INTRAVENOUS; SUBCUTANEOUS at 18:13

## 2022-03-06 RX ADMIN — Medication 0.6 MILLIGRAM(S): at 11:24

## 2022-03-06 RX ADMIN — ENOXAPARIN SODIUM 40 MILLIGRAM(S): 100 INJECTION SUBCUTANEOUS at 11:24

## 2022-03-06 RX ADMIN — OXYCODONE HYDROCHLORIDE 30 MILLIGRAM(S): 5 TABLET ORAL at 21:34

## 2022-03-06 RX ADMIN — Medication 650 MILLIGRAM(S): at 14:45

## 2022-03-06 RX ADMIN — HYDROMORPHONE HYDROCHLORIDE 3 MILLIGRAM(S): 2 INJECTION INTRAMUSCULAR; INTRAVENOUS; SUBCUTANEOUS at 11:59

## 2022-03-06 RX ADMIN — PANTOPRAZOLE SODIUM 40 MILLIGRAM(S): 20 TABLET, DELAYED RELEASE ORAL at 05:21

## 2022-03-06 RX ADMIN — OXYCODONE HYDROCHLORIDE 30 MILLIGRAM(S): 5 TABLET ORAL at 17:48

## 2022-03-06 RX ADMIN — HYDROMORPHONE HYDROCHLORIDE 3 MILLIGRAM(S): 2 INJECTION INTRAMUSCULAR; INTRAVENOUS; SUBCUTANEOUS at 14:45

## 2022-03-06 RX ADMIN — HYDROMORPHONE HYDROCHLORIDE 3 MILLIGRAM(S): 2 INJECTION INTRAMUSCULAR; INTRAVENOUS; SUBCUTANEOUS at 18:43

## 2022-03-06 RX ADMIN — ONDANSETRON 4 MILLIGRAM(S): 8 TABLET, FILM COATED ORAL at 14:51

## 2022-03-06 RX ADMIN — ONDANSETRON 4 MILLIGRAM(S): 8 TABLET, FILM COATED ORAL at 07:53

## 2022-03-06 RX ADMIN — HYDROMORPHONE HYDROCHLORIDE 3 MILLIGRAM(S): 2 INJECTION INTRAMUSCULAR; INTRAVENOUS; SUBCUTANEOUS at 08:53

## 2022-03-06 RX ADMIN — ONDANSETRON 4 MILLIGRAM(S): 8 TABLET, FILM COATED ORAL at 21:34

## 2022-03-06 RX ADMIN — OXYCODONE HYDROCHLORIDE 30 MILLIGRAM(S): 5 TABLET ORAL at 17:18

## 2022-03-06 RX ADMIN — HYDROMORPHONE HYDROCHLORIDE 3 MILLIGRAM(S): 2 INJECTION INTRAMUSCULAR; INTRAVENOUS; SUBCUTANEOUS at 05:52

## 2022-03-06 RX ADMIN — HYDROMORPHONE HYDROCHLORIDE 3 MILLIGRAM(S): 2 INJECTION INTRAMUSCULAR; INTRAVENOUS; SUBCUTANEOUS at 02:06

## 2022-03-06 RX ADMIN — Medication 1 MILLIGRAM(S): at 11:24

## 2022-03-06 NOTE — PROGRESS NOTE ADULT - ASSESSMENT
42yo M PMHx of Sickle Cell Disease, Gout who presented with abdominal pain and nausea. Patient recently discharged from the hospital for sickle cell crisis in the setting of anemia. The patient reports going to a birthday party over the weekend and afterwards developed severe abdominal pain with nausea and vomiting. Denies sick contacts. Patient reports inability to tolerate any PO intake. Afterwards he began to develop pain in his joints bilaterally. In the ED, Hgb 6.4 at baseline, Lipase wnl, T Bili 8.4 chronically elevated. Patient has epigastric pain    #sickle cell hepatic crisis vs intrahepatic cholestasis  #Sickle Cell Crisis  #RUQ pain with n/v  #Gout  RUQ pain, TTP, Nausea/vomitting, PO intolerance. Elevated direct/indirect bilirubin and GGT. Patient with history of cholecystectomy years ago.   Bilirubin and Hgb at baseline - plan for DC  - CT A/P Dilated retrocecal appendix, with small amount of free fluid within the right upper quadrant hepatorenal space - f/u surgery - no intervention  - f/u  hepatology, GI - labs pending  - Hgb stable  - Pain control, resume home oxycodone for moderate pain  -diet advance as tolerated  - Pantoprazole IV  - no Chest pain or shortness of breath, unlikely acute chest syndrome  - f/u hematology

## 2022-03-06 NOTE — PROGRESS NOTE ADULT - SUBJECTIVE AND OBJECTIVE BOX
Patient is a 42 y/o male, from home, with significant medical history of Sickle Cell Disease (follows with Dr. Julian MITCHELL), and Gout who presents with abdominal pain, nausea, and vomiting. He is a longstanding SCD patient with multiple crises and RBCs transfusion received since the last 6 months,Patient has a baseline hemoglobin 5.5g/dl.   Pt was  admitted to [facility] 2/24-2/26 for sickle cell crisis when he was found to be anemic to 4.4 and was transfused.  Pt was subsequently discharged.  Yesterday, the patient was home and it was his sisters birthday.  He ate pasta and cake then had severe abdominal pain,  nausea and vomiting began after.  He also reports headache and joint pain .   patient denies any dizziness,  palpitations, shortness of breath,  urinary symptoms or any other acute complaint.    Epigastric pain is slightly better  abdomen soft with mostly mild tenderness in the epigastric area    ICU Vital Signs Last 24 Hrs  T(C): 36.7 (06 Mar 2022 12:24), Max: 37.1 (05 Mar 2022 20:49)  T(F): 98 (06 Mar 2022 12:24), Max: 98.7 (05 Mar 2022 20:49)  HR: 89 (06 Mar 2022 12:24) (68 - 89)  BP: 144/74 (06 Mar 2022 12:24) (137/71 - 146/79)  BP(mean): --  ABP: --  ABP(mean): --  RR: 17 (06 Mar 2022 12:24) (16 - 17)  SpO2: 94% (06 Mar 2022 12:24) (94% - 100%)                            5.6    11.34 )-----------( 225      ( 06 Mar 2022 09:17 )             15.9     03-06    141  |  108  |  9   ----------------------------<  90  3.5   |  28  |  0.63    Ca    8.7      06 Mar 2022 09:17    TPro  6.6  /  Alb  2.9<L>  /  TBili  6.3<H>  /  DBili  x   /  AST  82<H>  /  ALT  60  /  AlkPhos  120  03-06      abdomen with mild upper abdominal tenderness  No acute surgical issues

## 2022-03-06 NOTE — PROGRESS NOTE ADULT - SUBJECTIVE AND OBJECTIVE BOX
[facility]  Patient is a 41y old  Male who presents with a chief complaint of Sickle Cell Crisis (05 Mar 2022 13:18)      SUBJECTIVE / OVERNIGHT EVENTS:  No acute events over night. Patient with mild improvement of abdominal pain. Patient is able to tolerate diet now. Denies any fevers/chills, headache, CP, SOB, N/V/D, constipation, or leg swelling.       MEDICATIONS  (STANDING):  chlorhexidine 2% Cloths 1 Application(s) Topical daily  colchicine 0.6 milliGRAM(s) Oral daily  enoxaparin Injectable 40 milliGRAM(s) SubCutaneous daily  folic acid 1 milliGRAM(s) Oral daily  lactated ringers. 1000 milliLiter(s) (100 mL/Hr) IV Continuous <Continuous>  pantoprazole    Tablet 40 milliGRAM(s) Oral before breakfast  polyethylene glycol 3350 17 Gram(s) Oral daily  senna 2 Tablet(s) Oral at bedtime    MEDICATIONS  (PRN):  acetaminophen     Tablet .. 650 milliGRAM(s) Oral every 6 hours PRN Temp greater or equal to 38C (100.4F), Mild Pain (1 - 3)  aluminum hydroxide/magnesium hydroxide/simethicone Suspension 30 milliLiter(s) Oral every 4 hours PRN Dyspepsia  HYDROmorphone  Injectable 3 milliGRAM(s) IV Push every 3 hours PRN Severe Pain (7 - 10)  melatonin 3 milliGRAM(s) Oral at bedtime PRN Insomnia  ondansetron Injectable 4 milliGRAM(s) IV Push every 6 hours PRN Nausea and/or Vomiting  oxyCODONE    IR 30 milliGRAM(s) Oral every 4 hours PRN Moderate Pain (4 - 6)          OBJECTIVE:  Vital Signs Last 24 Hrs  T(C): 36.7 (05 Mar 2022 12:31), Max: 37.1 (04 Mar 2022 20:46)  T(F): 98 (05 Mar 2022 12:31), Max: 98.8 (04 Mar 2022 20:46)  HR: 77 (05 Mar 2022 12:31) (60 - 80)  BP: 148/81 (05 Mar 2022 12:31) (130/78 - 148/81)  BP(mean): 93 (05 Mar 2022 03:20) (88 - 93)  RR: 17 (05 Mar 2022 12:31) (17 - 18)  SpO2: 94% (05 Mar 2022 12:31) (94% - 100%)    PHYSICAL EXAM:  GENERAL: NAD, well-developed  HEAD:  Atraumatic, Normocephalic  EYES: conjunctiva and sclera clear  NECK: Supple, No JVD  CHEST/LUNG: Clear to auscultation bilaterally; No wheeze  HEART: Regular rate and rhythm; No murmurs, rubs, or gallops  ABDOMEN: Soft, Nontender, Nondistended; Bowel sounds present  EXTREMITIES:  No clubbing, cyanosis, or edema  PSYCH: AAOx3  NEUROLOGY: non-focal  SKIN: No rashes or lesions    CAPILLARY BLOOD GLUCOSE        I&O's Summary            LABS:                        5.7    9.96  )-----------( 215      ( 05 Mar 2022 07:16 )             16.2     03-05    141  |  107  |  13  ----------------------------<  88  3.6   |  28  |  0.64    Ca    8.9      05 Mar 2022 07:16    TPro  6.6  /  Alb  3.1<L>  /  TBili  6.8<H>  /  DBili  3.3<H>  /  AST  95<H>  /  ALT  67<H>  /  AlkPhos  121<H>  03-05    PT/INR - ( 04 Mar 2022 06:34 )   PT: 13.7 sec;   INR: 1.15 ratio         PTT - ( 04 Mar 2022 06:34 )  PTT:29.2 sec              RADIOLOGY & ADDITIONAL TESTS:

## 2022-03-07 ENCOUNTER — TRANSCRIPTION ENCOUNTER (OUTPATIENT)
Age: 42
End: 2022-03-07

## 2022-03-07 VITALS
DIASTOLIC BLOOD PRESSURE: 87 MMHG | SYSTOLIC BLOOD PRESSURE: 153 MMHG | OXYGEN SATURATION: 97 % | RESPIRATION RATE: 16 BRPM | TEMPERATURE: 98 F | HEART RATE: 92 BPM

## 2022-03-07 LAB
ALBUMIN SERPL ELPH-MCNC: 3.2 G/DL — LOW (ref 3.5–5)
ALP SERPL-CCNC: 119 U/L — SIGNIFICANT CHANGE UP (ref 40–120)
ALT FLD-CCNC: 56 U/L DA — SIGNIFICANT CHANGE UP (ref 10–60)
ANION GAP SERPL CALC-SCNC: 5 MMOL/L — SIGNIFICANT CHANGE UP (ref 5–17)
AST SERPL-CCNC: 75 U/L — HIGH (ref 10–40)
BILIRUB DIRECT SERPL-MCNC: 2.7 MG/DL — HIGH (ref 0–0.3)
BILIRUB INDIRECT FLD-MCNC: 3.5 MG/DL — HIGH (ref 0.2–1)
BILIRUB SERPL-MCNC: 6.2 MG/DL — HIGH (ref 0.2–1.2)
BUN SERPL-MCNC: 10 MG/DL — SIGNIFICANT CHANGE UP (ref 7–18)
CALCIUM SERPL-MCNC: 8.3 MG/DL — LOW (ref 8.4–10.5)
CHLORIDE SERPL-SCNC: 110 MMOL/L — HIGH (ref 96–108)
CO2 SERPL-SCNC: 26 MMOL/L — SIGNIFICANT CHANGE UP (ref 22–31)
CREAT SERPL-MCNC: 0.7 MG/DL — SIGNIFICANT CHANGE UP (ref 0.5–1.3)
EGFR: 118 ML/MIN/1.73M2 — SIGNIFICANT CHANGE UP
GLUCOSE SERPL-MCNC: 83 MG/DL — SIGNIFICANT CHANGE UP (ref 70–99)
HCT VFR BLD CALC: 16.2 % — CRITICAL LOW (ref 39–50)
HGB BLD-MCNC: 5.8 G/DL — CRITICAL LOW (ref 13–17)
MCHC RBC-ENTMCNC: 31.9 PG — SIGNIFICANT CHANGE UP (ref 27–34)
MCHC RBC-ENTMCNC: 35.8 GM/DL — SIGNIFICANT CHANGE UP (ref 32–36)
MCV RBC AUTO: 89 FL — SIGNIFICANT CHANGE UP (ref 80–100)
NRBC # BLD: 0 /100 WBCS — SIGNIFICANT CHANGE UP (ref 0–0)
PLATELET # BLD AUTO: 265 K/UL — SIGNIFICANT CHANGE UP (ref 150–400)
POTASSIUM SERPL-MCNC: 3.5 MMOL/L — SIGNIFICANT CHANGE UP (ref 3.5–5.3)
POTASSIUM SERPL-SCNC: 3.5 MMOL/L — SIGNIFICANT CHANGE UP (ref 3.5–5.3)
PROT SERPL-MCNC: 6.5 G/DL — SIGNIFICANT CHANGE UP (ref 6–8.3)
RBC # BLD: 1.82 M/UL — LOW (ref 4.2–5.8)
RBC # FLD: 18.7 % — HIGH (ref 10.3–14.5)
SARS-COV-2 RNA SPEC QL NAA+PROBE: SIGNIFICANT CHANGE UP
SODIUM SERPL-SCNC: 141 MMOL/L — SIGNIFICANT CHANGE UP (ref 135–145)
WBC # BLD: 12.45 K/UL — HIGH (ref 3.8–10.5)
WBC # FLD AUTO: 12.45 K/UL — HIGH (ref 3.8–10.5)

## 2022-03-07 PROCEDURE — 83615 LACTATE (LD) (LDH) ENZYME: CPT

## 2022-03-07 PROCEDURE — 99232 SBSQ HOSP IP/OBS MODERATE 35: CPT

## 2022-03-07 PROCEDURE — P9040: CPT

## 2022-03-07 PROCEDURE — 86900 BLOOD TYPING SEROLOGIC ABO: CPT

## 2022-03-07 PROCEDURE — 80076 HEPATIC FUNCTION PANEL: CPT

## 2022-03-07 PROCEDURE — 85025 COMPLETE CBC W/AUTO DIFF WBC: CPT

## 2022-03-07 PROCEDURE — 82248 BILIRUBIN DIRECT: CPT

## 2022-03-07 PROCEDURE — 80074 ACUTE HEPATITIS PANEL: CPT

## 2022-03-07 PROCEDURE — 74177 CT ABD & PELVIS W/CONTRAST: CPT

## 2022-03-07 PROCEDURE — 85045 AUTOMATED RETICULOCYTE COUNT: CPT

## 2022-03-07 PROCEDURE — 96375 TX/PRO/DX INJ NEW DRUG ADDON: CPT

## 2022-03-07 PROCEDURE — 85610 PROTHROMBIN TIME: CPT

## 2022-03-07 PROCEDURE — 99285 EMERGENCY DEPT VISIT HI MDM: CPT | Mod: 25

## 2022-03-07 PROCEDURE — 83010 ASSAY OF HAPTOGLOBIN QUANT: CPT

## 2022-03-07 PROCEDURE — 83735 ASSAY OF MAGNESIUM: CPT

## 2022-03-07 PROCEDURE — 81001 URINALYSIS AUTO W/SCOPE: CPT

## 2022-03-07 PROCEDURE — 86880 COOMBS TEST DIRECT: CPT

## 2022-03-07 PROCEDURE — 85027 COMPLETE CBC AUTOMATED: CPT

## 2022-03-07 PROCEDURE — 96374 THER/PROPH/DIAG INJ IV PUSH: CPT

## 2022-03-07 PROCEDURE — 83605 ASSAY OF LACTIC ACID: CPT

## 2022-03-07 PROCEDURE — 36415 COLL VENOUS BLD VENIPUNCTURE: CPT

## 2022-03-07 PROCEDURE — 84100 ASSAY OF PHOSPHORUS: CPT

## 2022-03-07 PROCEDURE — 76700 US EXAM ABDOM COMPLETE: CPT

## 2022-03-07 PROCEDURE — 86922 COMPATIBILITY TEST ANTIGLOB: CPT

## 2022-03-07 PROCEDURE — 87635 SARS-COV-2 COVID-19 AMP PRB: CPT

## 2022-03-07 PROCEDURE — 85730 THROMBOPLASTIN TIME PARTIAL: CPT

## 2022-03-07 PROCEDURE — 86901 BLOOD TYPING SEROLOGIC RH(D): CPT

## 2022-03-07 PROCEDURE — 83690 ASSAY OF LIPASE: CPT

## 2022-03-07 PROCEDURE — 99239 HOSP IP/OBS DSCHRG MGMT >30: CPT

## 2022-03-07 PROCEDURE — 83020 HEMOGLOBIN ELECTROPHORESIS: CPT

## 2022-03-07 PROCEDURE — 80048 BASIC METABOLIC PNL TOTAL CA: CPT

## 2022-03-07 PROCEDURE — 82977 ASSAY OF GGT: CPT

## 2022-03-07 PROCEDURE — 36430 TRANSFUSION BLD/BLD COMPNT: CPT

## 2022-03-07 PROCEDURE — 80053 COMPREHEN METABOLIC PANEL: CPT

## 2022-03-07 PROCEDURE — 86850 RBC ANTIBODY SCREEN: CPT

## 2022-03-07 RX ORDER — SENNA PLUS 8.6 MG/1
2 TABLET ORAL
Qty: 0 | Refills: 0 | DISCHARGE
Start: 2022-03-07

## 2022-03-07 RX ADMIN — OXYCODONE HYDROCHLORIDE 30 MILLIGRAM(S): 5 TABLET ORAL at 03:30

## 2022-03-07 RX ADMIN — PANTOPRAZOLE SODIUM 40 MILLIGRAM(S): 20 TABLET, DELAYED RELEASE ORAL at 06:34

## 2022-03-07 RX ADMIN — Medication 0.6 MILLIGRAM(S): at 11:12

## 2022-03-07 RX ADMIN — OXYCODONE HYDROCHLORIDE 30 MILLIGRAM(S): 5 TABLET ORAL at 11:12

## 2022-03-07 RX ADMIN — OXYCODONE HYDROCHLORIDE 30 MILLIGRAM(S): 5 TABLET ORAL at 07:05

## 2022-03-07 RX ADMIN — OXYCODONE HYDROCHLORIDE 30 MILLIGRAM(S): 5 TABLET ORAL at 07:35

## 2022-03-07 RX ADMIN — Medication 1 MILLIGRAM(S): at 11:13

## 2022-03-07 RX ADMIN — OXYCODONE HYDROCHLORIDE 30 MILLIGRAM(S): 5 TABLET ORAL at 03:00

## 2022-03-07 NOTE — DISCHARGE NOTE NURSING/CASE MANAGEMENT/SOCIAL WORK - NSDCPEFALRISK_GEN_ALL_CORE
For information on Fall & Injury Prevention, visit: [email or web address] OR  [email or web address] OR  https://www.cdc.gov/steadi/patient.html

## 2022-03-07 NOTE — PROGRESS NOTE ADULT - ASSESSMENT
42 y/o Male w/ nonspecific epigastric abdominal pain, Sickle Cell Crisis  Afeb, VSS    -No acute surgical intervention indicated at present time   -Pt tolerating regular diet well   -Care as per medical team   -Reconsult as needed

## 2022-03-07 NOTE — PROVIDER CONTACT NOTE (CRITICAL VALUE NOTIFICATION) - PERSON GIVING RESULT:
Keyla Ohara/ penelope
Ranjana/ Lab
[facility]
ROSALINDA Ohara/ Dennis
ÁNGEL Chilel
ÁNGEL carrasco

## 2022-03-07 NOTE — PROGRESS NOTE ADULT - PROVIDER SPECIALTY LIST ADULT
Surgery
Hepatology
Internal Medicine
Surgery
Heme/Onc
Heme/Onc
Internal Medicine
Heme/Onc
Gastroenterology
Gastroenterology
Internal Medicine

## 2022-03-07 NOTE — DISCHARGE NOTE NURSING/CASE MANAGEMENT/SOCIAL WORK - PATIENT PORTAL LINK FT
You can access the FollowMyHealth Patient Portal offered by [facility] by registering at the following website: [email or web address]. By joining [facility]’s FollowMyHealth portal, you will also be able to view your health information using other applications (apps) compatible with our system.

## 2022-03-07 NOTE — PROVIDER CONTACT NOTE (CRITICAL VALUE NOTIFICATION) - TEST AND RESULT REPORTED:
Critical Lab value hemoglobin 5.6 and hematocrit 15.9
Critical Hemoglobin 5.7 and hematocrit 16.2
H&H 6.0/16.8
H/H 5.8/16.2
Hgb 5.6 Hct 15.7
H&H 5.9/16.0

## 2022-03-07 NOTE — PROGRESS NOTE ADULT - NSPROGADDITIONALINFOA_GEN_ALL_CORE
I have personally seen and examined the patient.  I fully participated in the care of this patient.  I have made amendments to the documentation where necessary, and agree with the history, physical exam, and plan as documented by the PA

## 2022-03-07 NOTE — PROGRESS NOTE ADULT - SUBJECTIVE AND OBJECTIVE BOX
CHIEF COMPLAINT  Sickle Cell Crisis    HISTORY OF PRESENT ILLNESS  TOLU VARGAS is a 41y Male who presents with a chief complaint of sickle cell crisis.    No acute events. No complaints.    REVIEW OF SYSTEMS  A complete review of systems was performed; negative except per HPI    PHYSICAL EXAM  T(C): 37.4 (03-07-22 @ 05:15), Max: 37.4 (03-07-22 @ 05:15)  HR: 72 (03-07-22 @ 05:15) (72 - 89)  BP: 129/70 (03-07-22 @ 05:15) (129/70 - 144/74)  RR: 16 (03-07-22 @ 05:15) (16 - 17)  SpO2: 97% (03-07-22 @ 05:15) (94% - 97%)  Constitutional: alert, awake, in no acute distress  Eyes: PERRL, EOMI  HEENT: normocephalic, atraumatic  Neck: supple, non-tender  Cardiovascular: normal perfusion, no peripheral edema  Respiratory: normal respiratory efforts; no increased use of accessory muscles  Gastrointestinal: soft, non-tender  Musculoskeletal: normal range of motion, no deformities noted  Neurological: alert, CN II to XI grossly intact  Skin: warm, dry    LABORATORY DATA                        5.8    12.45 )-----------( 265      ( 07 Mar 2022 07:30 )             16.2     03-07    141  |  110<H>  |  10  ----------------------------<  83  3.5   |  26  |  0.70    Ca    8.3<L>      07 Mar 2022 07:30    TPro  6.5  /  Alb  3.2<L>  /  TBili  6.2<H>  /  DBili  2.7<H>  /  AST  75<H>  /  ALT  56  /  AlkPhos  119  03-07   CHIEF COMPLAINT  Sickle Cell Crisis    HISTORY OF PRESENT ILLNESS  TOLU VARGAS is a 41y Male who presents with a chief complaint of sickle cell crisis.    No acute events. No complaints.    REVIEW OF SYSTEMS  A complete review of systems was performed; negative except per HPI    PHYSICAL EXAM  T(C): 37.4 (03-07-22 @ 05:15), Max: 37.4 (03-07-22 @ 05:15)  HR: 72 (03-07-22 @ 05:15) (72 - 89)  BP: 129/70 (03-07-22 @ 05:15) (129/70 - 144/74)  RR: 16 (03-07-22 @ 05:15) (16 - 17)  SpO2: 97% (03-07-22 @ 05:15) (94% - 97%)  Constitutional: alert, awake, in no acute distress  Eyes: PERRL, EOMI  HEENT: normocephalic, atraumatic  Neck: supple, non-tender  Cardiovascular: normal perfusion, no peripheral edema  Respiratory: normal respiratory efforts; no increased use of accessory muscles  Gastrointestinal: soft, non-tender  Musculoskeletal: normal range of motion, no deformities noted  Neurological: alert, CN II to XI grossly intact  Skin: warm, dry, jaundiced    LABORATORY DATA                        5.8    12.45 )-----------( 265      ( 07 Mar 2022 07:30 )             16.2     03-07    141  |  110<H>  |  10  ----------------------------<  83  3.5   |  26  |  0.70    Ca    8.3<L>      07 Mar 2022 07:30    TPro  6.5  /  Alb  3.2<L>  /  TBili  6.2<H>  /  DBili  2.7<H>  /  AST  75<H>  /  ALT  56  /  AlkPhos  119  03-07

## 2022-03-07 NOTE — PROGRESS NOTE ADULT - ASSESSMENT
TOLU VARGAS is a 41y Male who presents with a chief complaint of sickle cell crisis.    Sickle Cell Disease  - Patient with known sickle cell disease. Baseline hemoglobin around 5 to 6.   - Would hold further transfusions at this time unless he has symptomatic anemia given patient has improving bilirubin.   - Continue pain control and IVF hydration as needed. Advance diet as tolerated.  - Continue folic acid supplementation    Hyperbilirubinemia  - Bilirubin is improving; both indirect and direct.  - Etiology possibly intrahepatic cholestasis. Hepatology is following.  - Continue to monitor liver panel.    Will continue to follow.  On discharge, to follow-up with Dr. Shirley Jordan.    Bala Pan MD  Hematology/Oncology  O: [phone]

## 2022-03-07 NOTE — PROGRESS NOTE ADULT - REASON FOR ADMISSION
Sickle Cell Crisis

## 2022-03-07 NOTE — PROGRESS NOTE ADULT - SUBJECTIVE AND OBJECTIVE BOX
INTERVAL HPI/OVERNIGHT EVENTS:    Pt seen and examined at bedside. States epigastric abdominal pain somewhat improved, denies nausea or vomiting, tolerating reg diet well.      Vital Signs Last 24 Hrs  T(C): 37.4 (07 Mar 2022 05:15), Max: 37.4 (07 Mar 2022 05:15)  T(F): 99.4 (07 Mar 2022 05:15), Max: 99.4 (07 Mar 2022 05:15)  HR: 72 (07 Mar 2022 05:15) (72 - 89)  BP: 129/70 (07 Mar 2022 05:15) (129/70 - 144/74)  BP(mean): --  RR: 16 (07 Mar 2022 05:15) (16 - 17)  SpO2: 97% (07 Mar 2022 05:15) (94% - 97%)  I&O's Detail    aluminum hydroxide/magnesium hydroxide/simethicone Suspension 30 milliLiter(s) Oral every 4 hours PRN  pantoprazole    Tablet 40 milliGRAM(s) Oral before breakfast  polyethylene glycol 3350 17 Gram(s) Oral daily  senna 2 Tablet(s) Oral at bedtime      Physical Exam  General: AAOx3, No acute distress  Abdomen: soft,  nondistended, nontender, no rebound tenderness, no guarding, no palpable masses  Extremities: non edematous, no calf pain bilaterally        Labs:                        5.8    12.45 )-----------( 265      ( 07 Mar 2022 07:30 )             16.2     03-07    141  |  110<H>  |  10  ----------------------------<  83  3.5   |  26  |  0.70    Ca    8.3<L>      07 Mar 2022 07:30    TPro  6.5  /  Alb  3.2<L>  /  TBili  6.2<H>  /  DBili  2.7<H>  /  AST  75<H>  /  ALT  56  /  AlkPhos  119  03-07

## 2022-03-08 LAB — MISCELLANEOUS TEST NAME: SIGNIFICANT CHANGE UP

## 2022-03-10 ENCOUNTER — INPATIENT (INPATIENT)
Facility: HOSPITAL | Age: 42
LOS: 6 days | Discharge: HOME | DRG: 812 | End: 2022-03-17
Attending: STUDENT IN AN ORGANIZED HEALTH CARE EDUCATION/TRAINING PROGRAM | Admitting: STUDENT IN AN ORGANIZED HEALTH CARE EDUCATION/TRAINING PROGRAM
Payer: MEDICAID

## 2022-03-10 VITALS
WEIGHT: 190.04 LBS | HEIGHT: 69 IN | SYSTOLIC BLOOD PRESSURE: 142 MMHG | RESPIRATION RATE: 16 BRPM | HEART RATE: 82 BPM | TEMPERATURE: 99 F | OXYGEN SATURATION: 97 % | DIASTOLIC BLOOD PRESSURE: 83 MMHG

## 2022-03-10 DIAGNOSIS — R50.9 FEVER, UNSPECIFIED: ICD-10-CM

## 2022-03-10 DIAGNOSIS — Z90.49 ACQUIRED ABSENCE OF OTHER SPECIFIED PARTS OF DIGESTIVE TRACT: Chronic | ICD-10-CM

## 2022-03-10 LAB
ALBUMIN SERPL ELPH-MCNC: 3.5 G/DL — SIGNIFICANT CHANGE UP (ref 3.5–5)
ALP SERPL-CCNC: 123 U/L — HIGH (ref 40–120)
ALT FLD-CCNC: 58 U/L DA — SIGNIFICANT CHANGE UP (ref 10–60)
ANION GAP SERPL CALC-SCNC: 6 MMOL/L — SIGNIFICANT CHANGE UP (ref 5–17)
ANISOCYTOSIS BLD QL: SLIGHT — SIGNIFICANT CHANGE UP
APPEARANCE UR: CLEAR — SIGNIFICANT CHANGE UP
AST SERPL-CCNC: 71 U/L — HIGH (ref 10–40)
BACTERIA # UR AUTO: ABNORMAL /HPF
BASOPHILS # BLD AUTO: 0 K/UL — SIGNIFICANT CHANGE UP (ref 0–0.2)
BASOPHILS NFR BLD AUTO: 0 % — SIGNIFICANT CHANGE UP (ref 0–2)
BILIRUB SERPL-MCNC: 6.9 MG/DL — HIGH (ref 0.2–1.2)
BILIRUB UR-MCNC: ABNORMAL
BUN SERPL-MCNC: 12 MG/DL — SIGNIFICANT CHANGE UP (ref 7–18)
CALCIUM SERPL-MCNC: 8.7 MG/DL — SIGNIFICANT CHANGE UP (ref 8.4–10.5)
CHLORIDE SERPL-SCNC: 109 MMOL/L — HIGH (ref 96–108)
CO2 SERPL-SCNC: 24 MMOL/L — SIGNIFICANT CHANGE UP (ref 22–31)
COLOR SPEC: YELLOW — SIGNIFICANT CHANGE UP
CREAT SERPL-MCNC: 0.79 MG/DL — SIGNIFICANT CHANGE UP (ref 0.5–1.3)
DIFF PNL FLD: ABNORMAL
EGFR: 114 ML/MIN/1.73M2 — SIGNIFICANT CHANGE UP
ELLIPTOCYTES BLD QL SMEAR: SLIGHT — SIGNIFICANT CHANGE UP
EOSINOPHIL # BLD AUTO: 0 K/UL — SIGNIFICANT CHANGE UP (ref 0–0.5)
EOSINOPHIL NFR BLD AUTO: 0 % — SIGNIFICANT CHANGE UP (ref 0–6)
EPI CELLS # UR: SIGNIFICANT CHANGE UP /HPF
GLUCOSE SERPL-MCNC: 87 MG/DL — SIGNIFICANT CHANGE UP (ref 70–99)
GLUCOSE UR QL: NEGATIVE — SIGNIFICANT CHANGE UP
HCT VFR BLD CALC: 18.6 % — CRITICAL LOW (ref 39–50)
HGB BLD-MCNC: 6.5 G/DL — CRITICAL LOW (ref 13–17)
HYPOCHROMIA BLD QL: SLIGHT — SIGNIFICANT CHANGE UP
HYPOSEGMENTATION: PRESENT — SIGNIFICANT CHANGE UP
KETONES UR-MCNC: NEGATIVE — SIGNIFICANT CHANGE UP
LEUKOCYTE ESTERASE UR-ACNC: NEGATIVE — SIGNIFICANT CHANGE UP
LG PLATELETS BLD QL AUTO: SLIGHT — SIGNIFICANT CHANGE UP
LYMPHOCYTES # BLD AUTO: 0.72 K/UL — LOW (ref 1–3.3)
LYMPHOCYTES # BLD AUTO: 5 % — LOW (ref 13–44)
MACROCYTES BLD QL: SLIGHT — SIGNIFICANT CHANGE UP
MANUAL SMEAR VERIFICATION: SIGNIFICANT CHANGE UP
MCHC RBC-ENTMCNC: 32 PG — SIGNIFICANT CHANGE UP (ref 27–34)
MCHC RBC-ENTMCNC: 34.9 GM/DL — SIGNIFICANT CHANGE UP (ref 32–36)
MCV RBC AUTO: 91.6 FL — SIGNIFICANT CHANGE UP (ref 80–100)
MICROCYTES BLD QL: SLIGHT — SIGNIFICANT CHANGE UP
MONOCYTES # BLD AUTO: 0.14 K/UL — SIGNIFICANT CHANGE UP (ref 0–0.9)
MONOCYTES NFR BLD AUTO: 1 % — LOW (ref 2–14)
NEUTROPHILS # BLD AUTO: 13.45 K/UL — HIGH (ref 1.8–7.4)
NEUTROPHILS NFR BLD AUTO: 93 % — HIGH (ref 43–77)
NEUTS BAND # BLD: 1 % — SIGNIFICANT CHANGE UP (ref 0–8)
NITRITE UR-MCNC: NEGATIVE — SIGNIFICANT CHANGE UP
NRBC # BLD: 1 /100 — HIGH (ref 0–0)
OVALOCYTES BLD QL SMEAR: SLIGHT — SIGNIFICANT CHANGE UP
PH UR: 7 — SIGNIFICANT CHANGE UP (ref 5–8)
PLAT MORPH BLD: NORMAL — SIGNIFICANT CHANGE UP
PLATELET # BLD AUTO: 374 K/UL — SIGNIFICANT CHANGE UP (ref 150–400)
POIKILOCYTOSIS BLD QL AUTO: SIGNIFICANT CHANGE UP
POLYCHROMASIA BLD QL SMEAR: SLIGHT — SIGNIFICANT CHANGE UP
POTASSIUM SERPL-MCNC: 4.2 MMOL/L — SIGNIFICANT CHANGE UP (ref 3.5–5.3)
POTASSIUM SERPL-SCNC: 4.2 MMOL/L — SIGNIFICANT CHANGE UP (ref 3.5–5.3)
PROT SERPL-MCNC: 7.2 G/DL — SIGNIFICANT CHANGE UP (ref 6–8.3)
PROT UR-MCNC: 30 MG/DL
RBC # BLD: 2.03 M/UL — LOW (ref 4.2–5.8)
RBC # FLD: 18.9 % — HIGH (ref 10.3–14.5)
RBC BLD AUTO: ABNORMAL
RBC CASTS # UR COMP ASSIST: ABNORMAL /HPF (ref 0–2)
SARS-COV-2 RNA SPEC QL NAA+PROBE: SIGNIFICANT CHANGE UP
SICKLE CELLS BLD QL SMEAR: SIGNIFICANT CHANGE UP
SODIUM SERPL-SCNC: 139 MMOL/L — SIGNIFICANT CHANGE UP (ref 135–145)
SP GR SPEC: 1 — LOW (ref 1.01–1.02)
UROBILINOGEN FLD QL: 8
WBC # BLD: 14.31 K/UL — HIGH (ref 3.8–10.5)
WBC # FLD AUTO: 14.31 K/UL — HIGH (ref 3.8–10.5)
WBC UR QL: SIGNIFICANT CHANGE UP /HPF (ref 0–5)

## 2022-03-10 PROCEDURE — G1004: CPT

## 2022-03-10 PROCEDURE — 99285 EMERGENCY DEPT VISIT HI MDM: CPT

## 2022-03-10 PROCEDURE — 99223 1ST HOSP IP/OBS HIGH 75: CPT | Mod: GC

## 2022-03-10 PROCEDURE — 74177 CT ABD & PELVIS W/CONTRAST: CPT | Mod: 26,MG

## 2022-03-10 PROCEDURE — 71045 X-RAY EXAM CHEST 1 VIEW: CPT | Mod: 26

## 2022-03-10 RX ORDER — MORPHINE SULFATE 50 MG/1
4 CAPSULE, EXTENDED RELEASE ORAL ONCE
Refills: 0 | Status: DISCONTINUED | OUTPATIENT
Start: 2022-03-10 | End: 2022-03-10

## 2022-03-10 RX ORDER — HALOPERIDOL DECANOATE 100 MG/ML
2 INJECTION INTRAMUSCULAR ONCE
Refills: 0 | Status: COMPLETED | OUTPATIENT
Start: 2022-03-10 | End: 2022-03-10

## 2022-03-10 RX ORDER — HYDROMORPHONE HYDROCHLORIDE 2 MG/ML
1 INJECTION INTRAMUSCULAR; INTRAVENOUS; SUBCUTANEOUS ONCE
Refills: 0 | Status: DISCONTINUED | OUTPATIENT
Start: 2022-03-10 | End: 2022-03-10

## 2022-03-10 RX ORDER — SODIUM CHLORIDE 9 MG/ML
1000 INJECTION INTRAMUSCULAR; INTRAVENOUS; SUBCUTANEOUS ONCE
Refills: 0 | Status: COMPLETED | OUTPATIENT
Start: 2022-03-10 | End: 2022-03-10

## 2022-03-10 RX ORDER — METOCLOPRAMIDE HCL 10 MG
10 TABLET ORAL ONCE
Refills: 0 | Status: COMPLETED | OUTPATIENT
Start: 2022-03-10 | End: 2022-03-10

## 2022-03-10 RX ADMIN — Medication 10 MILLIGRAM(S): at 18:28

## 2022-03-10 RX ADMIN — MORPHINE SULFATE 4 MILLIGRAM(S): 50 CAPSULE, EXTENDED RELEASE ORAL at 18:16

## 2022-03-10 RX ADMIN — HYDROMORPHONE HYDROCHLORIDE 1 MILLIGRAM(S): 2 INJECTION INTRAMUSCULAR; INTRAVENOUS; SUBCUTANEOUS at 19:02

## 2022-03-10 RX ADMIN — SODIUM CHLORIDE 1000 MILLILITER(S): 9 INJECTION INTRAMUSCULAR; INTRAVENOUS; SUBCUTANEOUS at 18:17

## 2022-03-10 RX ADMIN — HYDROMORPHONE HYDROCHLORIDE 1 MILLIGRAM(S): 2 INJECTION INTRAMUSCULAR; INTRAVENOUS; SUBCUTANEOUS at 19:32

## 2022-03-10 RX ADMIN — HALOPERIDOL DECANOATE 2 MILLIGRAM(S): 100 INJECTION INTRAMUSCULAR at 21:13

## 2022-03-10 RX ADMIN — SODIUM CHLORIDE 1000 MILLILITER(S): 9 INJECTION INTRAMUSCULAR; INTRAVENOUS; SUBCUTANEOUS at 21:36

## 2022-03-10 NOTE — ED ADULT NURSE NOTE - NSIMPLEMENTINTERV_GEN_ALL_ED
Implemented All Universal Safety Interventions:  [place] to call system. Call bell, personal items and telephone within reach. Instruct patient to call for assistance. Room bathroom lighting operational. Non-slip footwear when patient is off stretcher. Physically safe environment: no spills, clutter or unnecessary equipment. Stretcher in lowest position, wheels locked, appropriate side rails in place.
Airway patent. Tooth 21 degenerated, w/ mild gingival irritation, no pus

## 2022-03-10 NOTE — ED PROVIDER NOTE - ATTENDING CONTRIBUTION TO CARE
40 y/o w gout, SCD recently hospitalized for pain crisis w/ pain in RUQ, CT on 3/4 showing dilated retrocecal appendix, with stranding and free fluid, now presenting with acute onset pain in his abdomen and vomiting. Exam concerning for pain crisis with suspicion for appendicitis. CBC, CMP CTAP. Will treat with bolus NS, morphine, zofran.

## 2022-03-10 NOTE — CHART NOTE - NSCHARTNOTEFT_GEN_A_CORE
Called by Dr. Vick to evaluate patient for possible appendicitis.   Patient 41 year old male with sickle cell disease. Recent admission due to pain and CT findings of dilated retrocecal appendix. Patient was cleared for discharge at that time.   Patient now complaining of severe diffuse pain all over his body which is greatest in abdomen and back. Per patient pain became worse today and has been associated with nausea and diarrhea. Subjective warmth per patient escort. Patient takes oxycodone for sickle cell which did not relieve his pain    Vital Signs Last 24 Hrs  T(F): 98.8 (03-10-22 @ 16:53), Max: 98.8 (03-10-22 @ 16:53)  HR: 82 (03-10-22 @ 16:53)  BP: 142/83 (03-10-22 @ 16:53)  RR: 16 (03-10-22 @ 16:53)  SpO2: 97% (03-10-22 @ 16:53)    FOCUSED EXAM  GENERAL: Alert, distress secondary to pain. Writhing during history and exam.  ABDOMEN: severe diffuse abdominal pain, jumps with very light palpation to all quadrants. Abdomen soft and mildly distended.     I&O's Detail    LABS:    A/P 41 year old male with recent CT finding of dilated appendix now with severe diffuse body pain greatest in abdomen.   Patient being evaluated for possible appendicitis. + abdominal pain, nausea, diarrhea. VSSS  Sickle cell disease, severe anemia on previous admission    - per discussion with ED attending recommend repeat CT scan of abdomen and pelvis and obtaining labs including CBC and chem   - will follow up imaging and lab results when completed  - recommend pain control and IV hydration by ED while awaiting work up  - will discuss with on call surgical attending

## 2022-03-10 NOTE — ED PROVIDER NOTE - NSFOLLOWUPINSTRUCTIONS_ED_ALL_ED_FT
42 y/o w gout, SCD recently hospitalized for pain crisis w/ pain in RUQ, CT on 3/4 showing dilated retrocecal appendix, with stranding and free fluid, now presenting with acute onset pain in his abdomen and vomiting. Exam concerning for pain crisis with suspicion for appendicitis. CBC, CMP CTAP. Will treat with bolus NS, morphine, zofran.    pt states he smokes pot since 12   recommend stopping
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
## 2022-03-10 NOTE — ED PROVIDER NOTE - OBJECTIVE STATEMENT
Pt. is a 40 y/o with gout and SCD recently hospitalized 2/24-2/26, 2/28-3/7 for pain crisis presenting with 5 hours of new pain, all over, primarily in abdomen and bilious vomiting. Pt reports he was feeling okay since discharge a few days ago until today at noon when he began having pain and took oxy. The pain progressed and he began vomiting and having diarrhea. Pt reports that he feels cold. Pt reports pain all over his body.

## 2022-03-10 NOTE — ED PROVIDER NOTE - CLINICAL SUMMARY MEDICAL DECISION MAKING FREE TEXT BOX
Pt is a 42 y/o w gout, SCD recently hospitalized for pain crisis w/ pain in RUQ, CT on 3/4 showing dilated retrocecal appendix, with stranding and free fluid, now presenting with acute onset pain in his abdomen and vomiting. Exam concerning for pain crisis with suspicion for appendicitis. CBC, CMP CTAP. Will treat with bolus NS, morphine, zofran.

## 2022-03-11 DIAGNOSIS — Z29.9 ENCOUNTER FOR PROPHYLACTIC MEASURES, UNSPECIFIED: ICD-10-CM

## 2022-03-11 DIAGNOSIS — R65.10 SYSTEMIC INFLAMMATORY RESPONSE SYNDROME (SIRS) OF NON-INFECTIOUS ORIGIN WITHOUT ACUTE ORGAN DYSFUNCTION: ICD-10-CM

## 2022-03-11 DIAGNOSIS — D64.9 ANEMIA, UNSPECIFIED: ICD-10-CM

## 2022-03-11 DIAGNOSIS — K37 UNSPECIFIED APPENDICITIS: ICD-10-CM

## 2022-03-11 DIAGNOSIS — R74.01 ELEVATION OF LEVELS OF LIVER TRANSAMINASE LEVELS: ICD-10-CM

## 2022-03-11 DIAGNOSIS — M10.9 GOUT, UNSPECIFIED: ICD-10-CM

## 2022-03-11 DIAGNOSIS — R10.9 UNSPECIFIED ABDOMINAL PAIN: ICD-10-CM

## 2022-03-11 DIAGNOSIS — D57.00 HB-SS DISEASE WITH CRISIS, UNSPECIFIED: ICD-10-CM

## 2022-03-11 LAB
ALBUMIN SERPL ELPH-MCNC: 3 G/DL — LOW (ref 3.5–5)
ALP SERPL-CCNC: 115 U/L — SIGNIFICANT CHANGE UP (ref 40–120)
ALT FLD-CCNC: 52 U/L DA — SIGNIFICANT CHANGE UP (ref 10–60)
ANION GAP SERPL CALC-SCNC: 4 MMOL/L — LOW (ref 5–17)
AST SERPL-CCNC: 74 U/L — HIGH (ref 10–40)
BILIRUB SERPL-MCNC: 8.5 MG/DL — HIGH (ref 0.2–1.2)
BUN SERPL-MCNC: 10 MG/DL — SIGNIFICANT CHANGE UP (ref 7–18)
CALCIUM SERPL-MCNC: 8.5 MG/DL — SIGNIFICANT CHANGE UP (ref 8.4–10.5)
CHLORIDE SERPL-SCNC: 110 MMOL/L — HIGH (ref 96–108)
CO2 SERPL-SCNC: 24 MMOL/L — SIGNIFICANT CHANGE UP (ref 22–31)
CREAT SERPL-MCNC: 0.84 MG/DL — SIGNIFICANT CHANGE UP (ref 0.5–1.3)
EGFR: 112 ML/MIN/1.73M2 — SIGNIFICANT CHANGE UP
GLUCOSE SERPL-MCNC: 95 MG/DL — SIGNIFICANT CHANGE UP (ref 70–99)
HCT VFR BLD CALC: 18.9 % — CRITICAL LOW (ref 39–50)
HGB BLD-MCNC: 6.3 G/DL — CRITICAL LOW (ref 13–17)
LACTATE SERPL-SCNC: 1.1 MMOL/L — SIGNIFICANT CHANGE UP (ref 0.7–2)
MAGNESIUM SERPL-MCNC: 1.5 MG/DL — LOW (ref 1.6–2.6)
MCHC RBC-ENTMCNC: 31.7 PG — SIGNIFICANT CHANGE UP (ref 27–34)
MCHC RBC-ENTMCNC: 33.3 GM/DL — SIGNIFICANT CHANGE UP (ref 32–36)
MCV RBC AUTO: 95 FL — SIGNIFICANT CHANGE UP (ref 80–100)
NRBC # BLD: 0 /100 WBCS — SIGNIFICANT CHANGE UP (ref 0–0)
PHOSPHATE SERPL-MCNC: 3.6 MG/DL — SIGNIFICANT CHANGE UP (ref 2.5–4.5)
PLATELET # BLD AUTO: 334 K/UL — SIGNIFICANT CHANGE UP (ref 150–400)
POTASSIUM SERPL-MCNC: 4.4 MMOL/L — SIGNIFICANT CHANGE UP (ref 3.5–5.3)
POTASSIUM SERPL-SCNC: 4.4 MMOL/L — SIGNIFICANT CHANGE UP (ref 3.5–5.3)
PROT SERPL-MCNC: 6.5 G/DL — SIGNIFICANT CHANGE UP (ref 6–8.3)
RBC # BLD: 1.99 M/UL — LOW (ref 4.2–5.8)
RBC # FLD: 19.4 % — HIGH (ref 10.3–14.5)
SODIUM SERPL-SCNC: 138 MMOL/L — SIGNIFICANT CHANGE UP (ref 135–145)
WBC # BLD: 11 K/UL — HIGH (ref 3.8–10.5)
WBC # FLD AUTO: 11 K/UL — HIGH (ref 3.8–10.5)

## 2022-03-11 PROCEDURE — 99222 1ST HOSP IP/OBS MODERATE 55: CPT

## 2022-03-11 RX ORDER — SUCRALFATE 1 G
1 TABLET ORAL
Refills: 0 | Status: COMPLETED | OUTPATIENT
Start: 2022-03-11 | End: 2022-03-14

## 2022-03-11 RX ORDER — ONDANSETRON 8 MG/1
4 TABLET, FILM COATED ORAL ONCE
Refills: 0 | Status: COMPLETED | OUTPATIENT
Start: 2022-03-11 | End: 2022-03-11

## 2022-03-11 RX ORDER — HYDROMORPHONE HYDROCHLORIDE 2 MG/ML
2 INJECTION INTRAMUSCULAR; INTRAVENOUS; SUBCUTANEOUS EVERY 4 HOURS
Refills: 0 | Status: DISCONTINUED | OUTPATIENT
Start: 2022-03-11 | End: 2022-03-11

## 2022-03-11 RX ORDER — SENNA PLUS 8.6 MG/1
2 TABLET ORAL AT BEDTIME
Refills: 0 | Status: DISCONTINUED | OUTPATIENT
Start: 2022-03-11 | End: 2022-03-17

## 2022-03-11 RX ORDER — INFLUENZA VIRUS VACCINE 15; 15; 15; 15 UG/.5ML; UG/.5ML; UG/.5ML; UG/.5ML
0.5 SUSPENSION INTRAMUSCULAR ONCE
Refills: 0 | Status: DISCONTINUED | OUTPATIENT
Start: 2022-03-11 | End: 2022-03-17

## 2022-03-11 RX ORDER — HEPARIN SODIUM 5000 [USP'U]/ML
5000 INJECTION INTRAVENOUS; SUBCUTANEOUS EVERY 8 HOURS
Refills: 0 | Status: DISCONTINUED | OUTPATIENT
Start: 2022-03-11 | End: 2022-03-17

## 2022-03-11 RX ORDER — HYDROMORPHONE HYDROCHLORIDE 2 MG/ML
3 INJECTION INTRAMUSCULAR; INTRAVENOUS; SUBCUTANEOUS EVERY 4 HOURS
Refills: 0 | Status: DISCONTINUED | OUTPATIENT
Start: 2022-03-11 | End: 2022-03-12

## 2022-03-11 RX ORDER — COLCHICINE 0.6 MG
0.6 TABLET ORAL DAILY
Refills: 0 | Status: DISCONTINUED | OUTPATIENT
Start: 2022-03-11 | End: 2022-03-17

## 2022-03-11 RX ORDER — OXYCODONE HYDROCHLORIDE 5 MG/1
30 TABLET ORAL EVERY 4 HOURS
Refills: 0 | Status: DISCONTINUED | OUTPATIENT
Start: 2022-03-11 | End: 2022-03-11

## 2022-03-11 RX ORDER — KETOROLAC TROMETHAMINE 30 MG/ML
30 SYRINGE (ML) INJECTION EVERY 6 HOURS
Refills: 0 | Status: DISCONTINUED | OUTPATIENT
Start: 2022-03-11 | End: 2022-03-11

## 2022-03-11 RX ORDER — PANTOPRAZOLE SODIUM 20 MG/1
40 TABLET, DELAYED RELEASE ORAL DAILY
Refills: 0 | Status: DISCONTINUED | OUTPATIENT
Start: 2022-03-11 | End: 2022-03-11

## 2022-03-11 RX ORDER — MAGNESIUM SULFATE 500 MG/ML
2 VIAL (ML) INJECTION ONCE
Refills: 0 | Status: COMPLETED | OUTPATIENT
Start: 2022-03-11 | End: 2022-03-11

## 2022-03-11 RX ORDER — FOLIC ACID 0.8 MG
1 TABLET ORAL DAILY
Refills: 0 | Status: DISCONTINUED | OUTPATIENT
Start: 2022-03-11 | End: 2022-03-12

## 2022-03-11 RX ORDER — PANTOPRAZOLE SODIUM 20 MG/1
40 TABLET, DELAYED RELEASE ORAL
Refills: 0 | Status: DISCONTINUED | OUTPATIENT
Start: 2022-03-11 | End: 2022-03-17

## 2022-03-11 RX ORDER — ACETAMINOPHEN 500 MG
650 TABLET ORAL EVERY 6 HOURS
Refills: 0 | Status: DISCONTINUED | OUTPATIENT
Start: 2022-03-11 | End: 2022-03-17

## 2022-03-11 RX ORDER — SODIUM CHLORIDE 9 MG/ML
1000 INJECTION INTRAMUSCULAR; INTRAVENOUS; SUBCUTANEOUS
Refills: 0 | Status: DISCONTINUED | OUTPATIENT
Start: 2022-03-11 | End: 2022-03-17

## 2022-03-11 RX ORDER — CIPROFLOXACIN LACTATE 400MG/40ML
400 VIAL (ML) INTRAVENOUS EVERY 12 HOURS
Refills: 0 | Status: DISCONTINUED | OUTPATIENT
Start: 2022-03-11 | End: 2022-03-11

## 2022-03-11 RX ORDER — LIDOCAINE 4 G/100G
5 CREAM TOPICAL EVERY 8 HOURS
Refills: 0 | Status: DISCONTINUED | OUTPATIENT
Start: 2022-03-11 | End: 2022-03-12

## 2022-03-11 RX ORDER — CEFTRIAXONE 500 MG/1
1000 INJECTION, POWDER, FOR SOLUTION INTRAMUSCULAR; INTRAVENOUS EVERY 24 HOURS
Refills: 0 | Status: DISCONTINUED | OUTPATIENT
Start: 2022-03-11 | End: 2022-03-11

## 2022-03-11 RX ORDER — ALLOPURINOL 300 MG
100 TABLET ORAL DAILY
Refills: 0 | Status: DISCONTINUED | OUTPATIENT
Start: 2022-03-11 | End: 2022-03-11

## 2022-03-11 RX ORDER — HYDROMORPHONE HYDROCHLORIDE 2 MG/ML
1 INJECTION INTRAMUSCULAR; INTRAVENOUS; SUBCUTANEOUS EVERY 4 HOURS
Refills: 0 | Status: DISCONTINUED | OUTPATIENT
Start: 2022-03-11 | End: 2022-03-11

## 2022-03-11 RX ADMIN — HYDROMORPHONE HYDROCHLORIDE 2 MILLIGRAM(S): 2 INJECTION INTRAMUSCULAR; INTRAVENOUS; SUBCUTANEOUS at 11:07

## 2022-03-11 RX ADMIN — SODIUM CHLORIDE 125 MILLILITER(S): 9 INJECTION INTRAMUSCULAR; INTRAVENOUS; SUBCUTANEOUS at 16:58

## 2022-03-11 RX ADMIN — HEPARIN SODIUM 5000 UNIT(S): 5000 INJECTION INTRAVENOUS; SUBCUTANEOUS at 21:28

## 2022-03-11 RX ADMIN — HYDROMORPHONE HYDROCHLORIDE 2 MILLIGRAM(S): 2 INJECTION INTRAMUSCULAR; INTRAVENOUS; SUBCUTANEOUS at 16:15

## 2022-03-11 RX ADMIN — HYDROMORPHONE HYDROCHLORIDE 1 MILLIGRAM(S): 2 INJECTION INTRAMUSCULAR; INTRAVENOUS; SUBCUTANEOUS at 03:51

## 2022-03-11 RX ADMIN — SODIUM CHLORIDE 125 MILLILITER(S): 9 INJECTION INTRAMUSCULAR; INTRAVENOUS; SUBCUTANEOUS at 09:31

## 2022-03-11 RX ADMIN — Medication 200 MILLIGRAM(S): at 05:09

## 2022-03-11 RX ADMIN — HYDROMORPHONE HYDROCHLORIDE 2 MILLIGRAM(S): 2 INJECTION INTRAMUSCULAR; INTRAVENOUS; SUBCUTANEOUS at 15:25

## 2022-03-11 RX ADMIN — Medication 1 MILLIGRAM(S): at 12:31

## 2022-03-11 RX ADMIN — Medication 1 GRAM(S): at 13:54

## 2022-03-11 RX ADMIN — HYDROMORPHONE HYDROCHLORIDE 2 MILLIGRAM(S): 2 INJECTION INTRAMUSCULAR; INTRAVENOUS; SUBCUTANEOUS at 12:00

## 2022-03-11 RX ADMIN — SODIUM CHLORIDE 125 MILLILITER(S): 9 INJECTION INTRAMUSCULAR; INTRAVENOUS; SUBCUTANEOUS at 21:27

## 2022-03-11 RX ADMIN — HEPARIN SODIUM 5000 UNIT(S): 5000 INJECTION INTRAVENOUS; SUBCUTANEOUS at 13:55

## 2022-03-11 RX ADMIN — MORPHINE SULFATE 4 MILLIGRAM(S): 50 CAPSULE, EXTENDED RELEASE ORAL at 05:42

## 2022-03-11 RX ADMIN — Medication 650 MILLIGRAM(S): at 06:10

## 2022-03-11 RX ADMIN — Medication 30 MILLIGRAM(S): at 04:08

## 2022-03-11 RX ADMIN — Medication 0.6 MILLIGRAM(S): at 12:31

## 2022-03-11 RX ADMIN — SENNA PLUS 2 TABLET(S): 8.6 TABLET ORAL at 21:28

## 2022-03-11 RX ADMIN — ONDANSETRON 4 MILLIGRAM(S): 8 TABLET, FILM COATED ORAL at 17:05

## 2022-03-11 RX ADMIN — HYDROMORPHONE HYDROCHLORIDE 3 MILLIGRAM(S): 2 INJECTION INTRAMUSCULAR; INTRAVENOUS; SUBCUTANEOUS at 20:31

## 2022-03-11 RX ADMIN — Medication 30 MILLIGRAM(S): at 02:10

## 2022-03-11 RX ADMIN — HYDROMORPHONE HYDROCHLORIDE 3 MILLIGRAM(S): 2 INJECTION INTRAMUSCULAR; INTRAVENOUS; SUBCUTANEOUS at 19:58

## 2022-03-11 RX ADMIN — HYDROMORPHONE HYDROCHLORIDE 1 MILLIGRAM(S): 2 INJECTION INTRAMUSCULAR; INTRAVENOUS; SUBCUTANEOUS at 07:57

## 2022-03-11 RX ADMIN — Medication 1 GRAM(S): at 17:12

## 2022-03-11 RX ADMIN — SODIUM CHLORIDE 125 MILLILITER(S): 9 INJECTION INTRAMUSCULAR; INTRAVENOUS; SUBCUTANEOUS at 02:10

## 2022-03-11 RX ADMIN — LIDOCAINE 5 MILLILITER(S): 4 CREAM TOPICAL at 21:28

## 2022-03-11 RX ADMIN — PANTOPRAZOLE SODIUM 40 MILLIGRAM(S): 20 TABLET, DELAYED RELEASE ORAL at 12:31

## 2022-03-11 RX ADMIN — HEPARIN SODIUM 5000 UNIT(S): 5000 INJECTION INTRAVENOUS; SUBCUTANEOUS at 05:09

## 2022-03-11 RX ADMIN — Medication 25 GRAM(S): at 17:27

## 2022-03-11 RX ADMIN — HYDROMORPHONE HYDROCHLORIDE 1 MILLIGRAM(S): 2 INJECTION INTRAMUSCULAR; INTRAVENOUS; SUBCUTANEOUS at 08:00

## 2022-03-11 RX ADMIN — Medication 650 MILLIGRAM(S): at 06:13

## 2022-03-11 NOTE — CONSULT NOTE ADULT - ASSESSMENT
41 year old male with SSD with HB 5-6 at baseline.  He was admitted for Hb 5 2 weeks ago for transfusion.  Noam was 12 and he had epigastric pain.  Noam went down to baseline.  CT was neg.  He went home with increasing pain, N/V/D and low grade fever.    1. sickle cell disease  no sob or chest paIN  h/h STABLE  continue pain meds  ?pain management consult for PCA    2 EPIGASTRIC PAIN with tenderness  same as last admission  continue PPI. will add carafate  CT is neg  ? need EGD    3. vomiting and diarrhea  ?withdrawal  ? marijuana induced hyperemesis  ?gastroenteritis  on antibiotics now.
Patient is a 42 y/o with gout and SCD recently hospitalized 2/24-2/26, 2/28-3/7 for pain crisis presenting with 5 hours of new pain, all over, primarily in abdomen and bilious vomiting. GI consult for abdominal pain with N&V.
Welcome Lora Rod  ×  Update Personal Info  FAQ  Search Results Help  Help  ×Due to maintenance there may be some small downtime tonight 3/8 around 8pm. If you experience continuing issues, close and open your browser and try again.   Patient Search   Multi-Patient Search   MME Calculator   Reports   Drug List   Designation   My RAFAELA #  Data Detail Level: Printer-Friendly View Extended View  Confidential Drug Utilization Report  Search Terms: macey gonzales, 1980Search Date: 03/12/2022 00:43:56 AM  The Drug Utilization Report below displays all of the controlled substance prescriptions, if any, that your patient has filled in the last twelve months. The information displayed on this report is compiled from pharmacy submissions to the Department, and accurately reflects the information as submitted by the pharmacies.    This report was requested by: Lora Rod | Reference #: [id number]    Others' Prescriptions  Patient Name: Macey GonzalesBirth Date: 1980  Address: [address] [place] [id number]: Male  Rx Written	Rx Dispensed	Drug	Quantity	Days Supply	Prescriber Name	Prescriber Rafaela #	Payment Method	Dispenser  02/24/2022	02/24/2022	oxycodone hcl (ir) 30 mg tab	180	30	Jordan, Shirley SOLITARIO	[id number]	Insurance	[facility]   12/30/2021	12/30/2021	oxycodone hcl (ir) 30 mg tab	180	30	BeatriceDarrion farnsworth	[id number]	Insurance	[facility]   12/02/2021	12/02/2021	oxycodone hcl (ir) 30 mg tab	180	30	Jordan, Shirley SOLITARIO	[id number]	Insurance	[facility]   11/04/2021	11/05/2021	oxycodone hcl 30 mg tablet	180	30	Jordan, Shirley SOLITARIO	[id number]	Insurance	[facility]   10/07/2021	10/07/2021	oxycodone hcl 30 mg tablet	180	30	Jordan, Shirley SOLITARIO	[id number]	Insurance	[facility]   08/12/2021	08/12/2021	oxycodone hcl 30 mg tablet	180	30	Jordan, Shirley SOLITARIO	[id number]	Insurance	[facility]   07/15/2021	07/15/2021	oxycodone hcl 30 mg tablet	180	30	Jordan, CeeCourtney MD	[id number]	Insurance	[facility]   05/19/2021	05/19/2021	oxycodone hcl 30 mg tablet	180	30	Shirley Jordan MD	[id number]	Insurance	[facility]

## 2022-03-11 NOTE — PROGRESS NOTE ADULT - SUBJECTIVE AND OBJECTIVE BOX
NP Note discussed with  Primary Attending    Patient is a 41y old  Male who presents with a chief complaint of Sickle cell crisis (11 Mar 2022 10:48)      INTERVAL HPI/OVERNIGHT EVENTS: no new complaints    MEDICATIONS  (STANDING):  colchicine 0.6 milliGRAM(s) Oral daily  folic acid 1 milliGRAM(s) Oral daily  heparin   Injectable 5000 Unit(s) SubCutaneous every 8 hours  influenza   Vaccine 0.5 milliLiter(s) IntraMuscular once  ondansetron Injectable 4 milliGRAM(s) IV Push once  pantoprazole    Tablet 40 milliGRAM(s) Oral daily  senna 2 Tablet(s) Oral at bedtime  sodium chloride 0.9%. 1000 milliLiter(s) (125 mL/Hr) IV Continuous <Continuous>  sucralfate 1 Gram(s) Oral four times a day    MEDICATIONS  (PRN):  acetaminophen     Tablet .. 650 milliGRAM(s) Oral every 6 hours PRN Temp greater or equal to 38C (100.4F), Mild Pain (1 - 3)  HYDROmorphone  Injectable 2 milliGRAM(s) IV Push every 4 hours PRN Severe Pain (7 - 10)      __________________________________________________  REVIEW OF SYSTEMS:    CONSTITUTIONAL: No fever,   EYES: no acute visual disturbances  NECK: No pain or stiffness  RESPIRATORY: No cough; No shortness of breath  CARDIOVASCULAR: No chest pain, no palpitations  GASTROINTESTINAL: No pain. No nausea or vomiting; No diarrhea   NEUROLOGICAL: No headache or numbness, no tremors  MUSCULOSKELETAL: No joint pain, no muscle pain  GENITOURINARY: no dysuria, no frequency, no hesitancy  PSYCHIATRY: no depression , no anxiety  ALL OTHER  ROS negative        Vital Signs Last 24 Hrs  T(C): 36.8 (11 Mar 2022 12:40), Max: 38.8 (10 Mar 2022 21:19)  T(F): 98.2 (11 Mar 2022 12:40), Max: 101.9 (10 Mar 2022 21:19)  HR: 76 (11 Mar 2022 12:40) (76 - 101)  BP: 144/82 (11 Mar 2022 12:40) (129/72 - 155/91)  BP(mean): --  RR: 18 (11 Mar 2022 12:40) (16 - 18)  SpO2: 95% (11 Mar 2022 12:40) (94% - 97%)    ________________________________________________  PHYSICAL EXAM:  GENERAL: NAD  HEENT: Normocephalic;  conjunctivae and sclerae clear; moist mucous membranes;   NECK : supple  CHEST/LUNG: Clear to auscultation bilaterally with good air entry   HEART: S1 S2  regular; no murmurs, gallops or rubs  ABDOMEN: Soft, Nontender, Nondistended; Bowel sounds present  EXTREMITIES: no cyanosis; no edema; no calf tenderness  SKIN: warm and dry; no rash  NERVOUS SYSTEM:  Awake and alert; Oriented  to place, person and time ; no new deficits    _________________________________________________  LABS:                        6.3    11.00 )-----------( 334      ( 11 Mar 2022 05:59 )             18.9     03-11    138  |  110<H>  |  10  ----------------------------<  95  4.4   |  24  |  0.84    Ca    8.5      11 Mar 2022 05:59  Phos  3.6     03-11  Mg     1.5     -11    TPro  6.5  /  Alb  3.0<L>  /  TBili  8.5<H>  /  DBili  x   /  AST  74<H>  /  ALT  52  /  AlkPhos  115  03-11      Urinalysis Basic - ( 10 Mar 2022 20:25 )    Color: Yellow / Appearance: Clear / S.005 / pH: x  Gluc: x / Ketone: Negative  / Bili: Small / Urobili: 8   Blood: x / Protein: 30 mg/dL / Nitrite: Negative   Leuk Esterase: Negative / RBC: 2-5 /HPF / WBC 0-2 /HPF   Sq Epi: x / Non Sq Epi: Few /HPF / Bacteria: Trace /HPF      CAPILLARY BLOOD GLUCOSE            RADIOLOGY & ADDITIONAL TESTS:  < from: CT Abdomen and Pelvis w/ IV Cont (03.10.22 @ 20:17) >  IMPRESSION:  No acute abdominal pathology.    Hepatic morphology is nonspecific but can be seen with underlying   cirrhosis.    Enlarged upper abdominal lymph nodes are nonspecific and can be seen in   the setting of portal hypertension.    < end of copied text >  < from: Xray Chest 1 View-PORTABLE IMMEDIATE (Xray Chest 1 View-PORTABLE IMMEDIATE .) (03.10.22 @ 22:08) >  IMPRESSION:   No radiographic evidence of active chest disease.    < end of copied text >          Imaging Personally Reviewed:  YES/NO    Consultant(s) Notes Reviewed:   YES/ No    Care Discussed with Consultants :     Plan of care was discussed with patient and /or primary care giver; all questions and concerns were addressed and care was aligned with patient's wishes.

## 2022-03-11 NOTE — H&P ADULT - PROBLEM SELECTOR PLAN 4
- Continue on home medications - H&H 6.5/18.6  - Baseline Hb >5  - Will hold off transfusions for now   - Will keep monitoring CBC

## 2022-03-11 NOTE — CONSULT NOTE ADULT - SUBJECTIVE AND OBJECTIVE BOX
Patient is a 41y old  Male who presents with a chief complaint of Sickle cell crisis (11 Mar 2022 01:54)      HPI:  Patient is a 40 y/o with gout and SCD recently hospitalized 2/24-2/26, 2/28-3/7 for pain crisis presenting with 5 hours of new pain, all over, primarily in abdomen and bilious vomiting. Patient reports he was feeling okay since discharge a few days ago until today at noon when he began having pain and took oxy. The pain progressed and he began vomiting and having diarrhea. Pt reports that he feels cold. Pt reports pain all over his body. (11 Mar 2022 01:54) now the pain is mainly at epigastric area like last admission.  He smokes pot.  Hed has fever 100.4 in ER.    ROS:  Negative except for:    PAST MEDICAL & SURGICAL HISTORY:  Sickle cell anemia    Gout    History of cholecystectomy        SOCIAL HISTORY:    FAMILY HISTORY:  Family history of sickle cell trait (Father, Mother)        MEDICATIONS  (STANDING):  ciprofloxacin   IVPB 400 milliGRAM(s) IV Intermittent every 12 hours  colchicine 0.6 milliGRAM(s) Oral daily  folic acid 1 milliGRAM(s) Oral daily  heparin   Injectable 5000 Unit(s) SubCutaneous every 8 hours  influenza   Vaccine 0.5 milliLiter(s) IntraMuscular once  senna 2 Tablet(s) Oral at bedtime  sodium chloride 0.9%. 1000 milliLiter(s) (125 mL/Hr) IV Continuous <Continuous>    MEDICATIONS  (PRN):  acetaminophen     Tablet .. 650 milliGRAM(s) Oral every 6 hours PRN Temp greater or equal to 38C (100.4F), Mild Pain (1 - 3)  HYDROmorphone  Injectable 2 milliGRAM(s) IV Push every 4 hours PRN Severe Pain (7 - 10)  ketorolac   Injectable 30 milliGRAM(s) IV Push every 6 hours PRN Moderate Pain (4 - 6)      Allergies    ceftriaxone (Anaphylaxis)  hydroxyurea (Other)  penicillin (Pruritus)  piperacillin-tazobactam (Urticaria)    Intolerances        Vital Signs Last 24 Hrs  T(C): 37.2 (11 Mar 2022 05:43), Max: 38.8 (10 Mar 2022 21:19)  T(F): 98.9 (11 Mar 2022 05:43), Max: 101.9 (10 Mar 2022 21:19)  HR: 86 (11 Mar 2022 04:20) (82 - 101)  BP: 138/68 (11 Mar 2022 05:43) (129/72 - 155/91)  BP(mean): --  RR: 18 (11 Mar 2022 05:43) (16 - 18)  SpO2: 95% (11 Mar 2022 05:43) (94% - 97%)    PHYSICAL EXAM  General: adult in NAD  HEENT: clear oropharynx, anicteric sclera, pink conjunctiva  Neck: supple  CV: normal S1/S2 with no murmur rubs or gallops  Lungs: positive air movement b/l ant lungs,clear to auscultation, no wheezes, no rales  Abdomen: soft non-tender non-distended, no hepatosplenomegaly  Ext: no clubbing cyanosis or edema  Skin: no rashes and no petechiae  Neuro: alert and oriented X 4, no focal deficits      LABS:                          6.3    11.00 )-----------( 334      ( 11 Mar 2022 05:59 )             18.9         Mean Cell Volume : 95.0 fl  Mean Cell Hemoglobin : 31.7 pg  Mean Cell Hemoglobin Concentration : 33.3 gm/dL  Auto Neutrophil # : x  Auto Lymphocyte # : x  Auto Monocyte # : x  Auto Eosinophil # : x  Auto Basophil # : x  Auto Neutrophil % : x  Auto Lymphocyte % : x  Auto Monocyte % : x  Auto Eosinophil % : x  Auto Basophil % : x      Serial CBC's  03-11 @ 05:59  Hct-18.9 / Hgb-6.3 / Plat-334 / RBC-1.99 / WBC-11.00  Serial CBC's  03-10 @ 18:22  Hct-18.6 / Hgb-6.5 / Plat-374 / RBC-2.03 / WBC-14.31      03-11    138  |  110<H>  |  10  ----------------------------<  95  4.4   |  24  |  0.84    Ca    8.5      11 Mar 2022 05:59  Phos  3.6     03-11  Mg     1.5     03-11    TPro  6.5  /  Alb  3.0<L>  /  TBili  8.5<H>  /  DBili  x   /  AST  74<H>  /  ALT  52  /  AlkPhos  115  03-11                      BLOOD SMEAR INTERPRETATION:       RADIOLOGY & ADDITIONAL STUDIES:  < from: CT Abdomen and Pelvis w/ IV Cont (03.10.22 @ 20:17) >  COMPARISON: CT abdomen pelvis 3/4/2022    CONTRAST/COMPLICATIONS:  IV Contrast: Omnipaque 350  90 cc administered   10 cc discarded  Oral Contrast: NONE  Complications: None reported at time of study completion    PROCEDURE:  CT of the Abdomen and Pelvis was performed.  Sagittal and coronal reformats were performed.    FINDINGS:  LOWER CHEST: Cardiomegaly. Bibasilar linear type atelectasis.    LIVER: Prominent hepatorenal notching and hypertrophy lateral segment of   the left hepatic lobe. Findings can be seen with cirrhosis. Periportal   edema.  BILE DUCTS: Normal caliber.  GALLBLADDER: Cholecystectomy.  SPLEEN: Jason infarcted calcified spleen.  PANCREAS: Within normal limits.  ADRENALS: Within normal limits.  KIDNEYS/URETERS: Left renal cyst. Symmetric enhancement. Partial left   renal duplication. No hydronephrosis.    BLADDER: Within normal limits.  REPRODUCTIVE ORGANS: Prostate within normal limits.    BOWEL: No bowel obstruction. Appendix is normal.  PERITONEUM: No ascites.  VESSELS: Within normal limits.  RETROPERITONEUM/LYMPH NODES: Stable enlarged upper abdominal lymph nodes   in the gastrohepatic, periportal, and portacaval regions, nonspecific.  ABDOMINAL WALL: Small fat-containing left inguinal hernia.  BONES: Stable moderate T12 and mild to moderate L1 compression   deformities..    IMPRESSION:  No acute abdominal pathology.    Hepatic morphology is nonspecific but can be seen with underlying   cirrhosis.    Enlarged upper abdominal lymph nodes are nonspecific and can be seen in   the setting of portal hypertension.        < end of copied text >    
Source of information: , Chart review, patient  Patient language: English  : n/a    CC: Patient is a 41y old  Male who presents with a chief complaint of Sickle cell crisis (11 Mar 2022 18:27)      HPI:  Patient is a 40 y/o with gout and SCD recently hospitalized -, -3/7 for pain crisis presenting with 5 hours of new pain, all over, primarily in abdomen and bilious vomiting. Patient reports he was feeling okay since discharge a few days ago until today at noon when he began having pain and took oxy. The pain progressed and he began vomiting and having diarrhea. Pt reports that he feels cold. Pt reports pain all over his body. (11 Mar 2022 01:54)      Pt with acute sickle cell crisis.  retic count >11.  +jaundice.  Pt takes oxy 30mg q 4-6 hours at home.  + n/v.  + cirrhosis    PAIN SCORE:    7/10   SCALE USED: (1-10 VNRS)    PAST MEDICAL & SURGICAL HISTORY:  Sickle cell anemia    Gout    History of cholecystectomy        FAMILY HISTORY:  Family history of sickle cell trait (Father, Mother)          SOCIAL HISTORY:  [ ] Denies Smoking, Alcohol, or Drug Use    Allergies    ceftriaxone (Anaphylaxis)  hydroxyurea (Other)  penicillin (Pruritus)  piperacillin-tazobactam (Urticaria)    Intolerances        MEDICATIONS:    MEDICATIONS  (STANDING):  colchicine 0.6 milliGRAM(s) Oral daily  folic acid 1 milliGRAM(s) Oral daily  heparin   Injectable 5000 Unit(s) SubCutaneous every 8 hours  influenza   Vaccine 0.5 milliLiter(s) IntraMuscular once  lidocaine 2% Viscous 5 milliLiter(s) Oral every 8 hours  pantoprazole    Tablet 40 milliGRAM(s) Oral two times a day  senna 2 Tablet(s) Oral at bedtime  sodium chloride 0.9%. 1000 milliLiter(s) (125 mL/Hr) IV Continuous <Continuous>  sucralfate 1 Gram(s) Oral four times a day    MEDICATIONS  (PRN):  acetaminophen     Tablet .. 650 milliGRAM(s) Oral every 6 hours PRN Temp greater or equal to 38C (100.4F), Mild Pain (1 - 3)  HYDROmorphone  Injectable 3 milliGRAM(s) IV Push every 4 hours PRN Severe Pain (7 - 10)      Vital Signs Last 24 Hrs  T(C): 37.2 (11 Mar 2022 19:24), Max: 37.2 (11 Mar 2022 05:43)  T(F): 99 (11 Mar 2022 19:24), Max: 99 (11 Mar 2022 19:24)  HR: 78 (11 Mar 2022 19:24) (76 - 86)  BP: 135/69 (11 Mar 2022 19:24) (129/72 - 144/82)  BP(mean): --  RR: 18 (11 Mar 2022 19:24) (16 - 18)  SpO2: 95% (11 Mar 2022 19:24) (94% - 95%)    LABS:                          6.3    11.00 )-----------( 334      ( 11 Mar 2022 05:59 )             18.9     03-11    138  |  110<H>  |  10  ----------------------------<  95  4.4   |  24  |  0.84    Ca    8.5      11 Mar 2022 05:59  Phos  3.6     03-11  Mg     1.5     03-11    TPro  6.5  /  Alb  3.0<L>  /  TBili  8.5<H>  /  DBili  x   /  AST  74<H>  /  ALT  52  /  AlkPhos  115  03-11      LIVER FUNCTIONS - ( 11 Mar 2022 05:59 )  Alb: 3.0 g/dL / Pro: 6.5 g/dL / ALK PHOS: 115 U/L / ALT: 52 U/L DA / AST: 74 U/L / GGT: x           Urinalysis Basic - ( 10 Mar 2022 20:25 )    Color: Yellow / Appearance: Clear / S.005 / pH: x  Gluc: x / Ketone: Negative  / Bili: Small / Urobili: 8   Blood: x / Protein: 30 mg/dL / Nitrite: Negative   Leuk Esterase: Negative / RBC: 2-5 /HPF / WBC 0-2 /HPF   Sq Epi: x / Non Sq Epi: Few /HPF / Bacteria: Trace /HPF      CAPILLARY BLOOD GLUCOSE          REVIEW OF SYSTEMS:  CONSTITUTIONAL: No fever or fatigue  RESPIRATORY: No cough, wheezing, chills or hemoptysis; No shortness of breath  CARDIOVASCULAR: No chest pain, palpitations, dizziness, or leg swelling  GASTROINTESTINAL: No abdominal or epigastric pain. No nausea, vomiting; No diarrhea or constipation.   GENITOURINARY: No dysuria, frequency, hematuria, retention or incontinence  MUSCULOSKELETAL: + joint pain   NEURO: No weakness, no numbness   PSYCHIATRIC: No depression, anxiety, mood swings, or difficulty sleeping    PHYSICAL EXAM:  GENERAL:  Alert & Oriented X3, NAD, Good concentration +jaundice  CHEST/LUNG: Clear to auscultation bilaterally; No rales, rhonchi, wheezing, or rubs  HEART: Regular rate and rhythm; No murmurs, rubs, or gallops  ABDOMEN: Soft, Nontender, Nondistended; Bowel sounds present  : no incontinence, no flank pain  EXTREMITIES:  2+ Peripheral Pulses, No cyanosis, or edema  MUSCULOSKELETAL: + diffuse joint pain   NEUROLOGICAL: awake and alert and oriented   SKIN: No rashes or lesions    Radiology:      Drug Screen:  heparin   Injectable 5000 Unit(s) SubCutaneous every 8 hours  < from: CT Abdomen and Pelvis w/ IV Cont (03.10.22 @ 20:17) >  ACC: [id number] EXAM:  CT ABDOMEN AND PELVIS IC                          PROCEDURE DATE:  03/10/2022          INTERPRETATION:  CLINICAL INFORMATION: Right upper quadrant pain and   vomiting    COMPARISON: CT abdomen pelvis 3/4/2022    CONTRAST/COMPLICATIONS:  IV Contrast: Omnipaque 350  90 cc administered   10 cc discarded  Oral Contrast: NONE  Complications: None reported at time of study completion    PROCEDURE:  CT of the Abdomen and Pelvis was performed.  Sagittal and coronal reformats were performed.    FINDINGS:  LOWER CHEST: Cardiomegaly. Bibasilar linear type atelectasis.    LIVER: Prominent hepatorenal notching and hypertrophy lateral segment of   the left hepatic lobe. Findings can be seen with cirrhosis. Periportal   edema.  BILE DUCTS: Normal caliber.  GALLBLADDER: Cholecystectomy.  SPLEEN: Jason infarcted calcified spleen.  PANCREAS: Within normal limits.  ADRENALS: Within normal limits.  KIDNEYS/URETERS: Left renal cyst. Symmetric enhancement. Partial left   renal duplication. No hydronephrosis.    BLADDER: Within normal limits.  REPRODUCTIVE ORGANS: Prostate within normal limits.    BOWEL: No bowel obstruction. Appendix is normal.  PERITONEUM: No ascites.  VESSELS: Within normal limits.  RETROPERITONEUM/LYMPH NODES: Stable enlarged upper abdominal lymph nodes   in the gastrohepatic, periportal, and portacaval regions, nonspecific.  ABDOMINAL WALL: Small fat-containing left inguinal hernia.  BONES: Stable moderate T12 and mild to moderate L1 compression   deformities..    IMPRESSION:  No acute abdominal pathology.    Hepatic morphology is nonspecific but can be seen with underlying   cirrhosis.    Enlarged upper abdominal lymph nodes are nonspecific and can be seen in   the setting of portal hypertension.    < end of copied text >          ORT Score   Family Hx of substance abuse	Female	Male  Alcohol 	                                              1              3  Illegal drugs	                                      2              3  Rx drugs                                               4	      4    Personal Hx of substance abuse		  Alcohol 	                                               3	      3  Illegal drugs                                  	       4	      4  Rx drugs                                                5	      5  Age between 16- 45 years	               1             1  hx preadolescent sexual abuse	       3	      0  Psychological disease		  ADD, OCD, bipolar, schizophrenia	2	      2  Depression                                    	1	      1  Score totals 		  		  a score of 3 or lower indicates low risk for opioid abuse		  a score of 4-7 indicates moderate risk for opioid abuse		  a score of 8 or higher indicates high risk for opioid abuse	    Risk factors associated with adverse outcomes related to opioid treatment  [ ]  Concurrent benzodiazepine use  [ ]  History/ Active substance use or alcohol use disorder  [ ] Psychiatric co-morbidity  [ ] Sleep apnea  [ ] COPD  [ ] BMI> 35  [ ] Liver dysfunction  [ ] Renal dysfunction  [ ] CHF  [ ] Smoker  [ ]  Age > 60 years      [ x]  [place]  Reviewed and Copied to Chart. See below.          
   INIITIAL GI CONSULTATION    Patient is a 41y old  Male who presents with a chief complaint of Sickle cell crisis (11 Mar 2022 16:56)    HPI:  Patient is a 42 y/o with gout and SCD recently hospitalized 2/24-2/26, 2/28-3/7 for pain crisis presenting with 5 hours of new pain, all over, primarily in abdomen and bilious vomiting. Patient reports he was feeling okay since discharge a few days ago until today at noon when he began having pain and took oxy. The pain progressed and he began vomiting and having diarrhea. Pt reports that he feels cold. Pt reports pain all over his body. (11 Mar 2022 01:54)      GI HPI  Patient is a 42 y/o with gout and SCD recently hospitalized 2/24-2/26, 2/28-3/7 for pain crisis presenting with 5 hours of new pain, all over, primarily in abdomen and bilious vomiting. GI consult for abdominal pain with N&V. Patient has not family history of GI issues, inflammatory bowel diease or cancers. Lab significant for WBC 11 H&H 6.3/189  Tbili 8.5 ALK 11.5 AST 74 ALK 52. CT A/p showed No acute abdominal pathology. Hepatic morphology is nonspecific but can be seen with underlying cirrhosis. Enlarged upper abdominal lymph nodes are nonspecific and can be seen in   the setting of portal hypertension Patient was recently discharge from the hospital with elevated LFT with unknown etiology. Patient reports when he got home he was fine. Several days after discharge started having epigastric pain. He took his PPI with some improvement. He decided to come back into the ED due to N&V of non bilious and non bloody content.  Patient denies difficulty sewallowing. Denies any blood, dark tarry or mucus in stool. Patient never had an EGD or colonoscopy       PMH/PSH:  PAST MEDICAL & SURGICAL HISTORY:  Sickle cell anemia    Gout    History of cholecystectomy      FH:  FAMILY HISTORY:  Family history of sickle cell trait (Father, Mother)        MEDS:  MEDICATIONS  (STANDING):  colchicine 0.6 milliGRAM(s) Oral daily  folic acid 1 milliGRAM(s) Oral daily  heparin   Injectable 5000 Unit(s) SubCutaneous every 8 hours  influenza   Vaccine 0.5 milliLiter(s) IntraMuscular once  pantoprazole    Tablet 40 milliGRAM(s) Oral daily  senna 2 Tablet(s) Oral at bedtime  sodium chloride 0.9%. 1000 milliLiter(s) (125 mL/Hr) IV Continuous <Continuous>  sucralfate 1 Gram(s) Oral four times a day    MEDICATIONS  (PRN):  acetaminophen     Tablet .. 650 milliGRAM(s) Oral every 6 hours PRN Temp greater or equal to 38C (100.4F), Mild Pain (1 - 3)  HYDROmorphone  Injectable 3 milliGRAM(s) IV Push every 4 hours PRN Severe Pain (7 - 10)    Allergies    ceftriaxone (Anaphylaxis)  hydroxyurea (Other)  penicillin (Pruritus)  piperacillin-tazobactam (Urticaria)    Intolerances            CONSTITUTIONAL:  No weight loss, fever, chills, weakness or fatigue.  HEENT:  Eyes:  No visual loss, blurred vision, double vision or yellow sclerae. Ears, Nose, Throat:  No hearing loss, sneezing, congestion, runny nose or sore throat.  SKIN:  No rash or itching.  CARDIOVASCULAR:  No chest pain, chest pressure or chest discomfort. No palpitations or edema.  RESPIRATORY:  No shortness of breath, cough or sputum.  GASTROINTESTINAL:  SEE HPI  GENITOURINARY:  No dysuria, hematuria, urinary frequency  NEUROLOGICAL:  No headache, dizziness, syncope, paralysis, ataxia, numbness or tingling in the extremities. No change in bowel or bladder control.  MUSCULOSKELETAL:  No muscle, back pain, joint pain or stiffness.  HEMATOLOGIC:  No anemia, bleeding or bruising.  LYMPHATICS:  No enlarged nodes. No history of splenectomy.  PSYCHIATRIC:  No history of depression or anxiety.  ENDOCRINOLOGIC:  No reports of sweating, cold or heat intolerance. No polyuria or polydipsia.      ______________________________________________________________________  PHYSICAL EXAM:  T(C): 36.8 (03-11-22 @ 12:40), Max: 38.8 (03-10-22 @ 21:19)  HR: 76 (03-11-22 @ 12:40)  BP: 144/82 (03-11-22 @ 12:40)  RR: 18 (03-11-22 @ 12:40)  SpO2: 95% (03-11-22 @ 12:40)  Wt(kg): --      GEN: NAD, normocephalic  HEENT: sclera icteric   CVS: S1S2+  CHEST: clear to auscultation  ABD: soft , nontender, nondistended, bowel sounds present  EXTR: no cyanosis, no clubbing, no edema  NEURO: Awake and alert; oriented x3  SKIN:  warm; icteric +    ______________________________________________________________________  LABS:                        6.3    11.00 )-----------( 334      ( 11 Mar 2022 05:59 )             18.9     03-11    138  |  110<H>  |  10  ----------------------------<  95  4.4   |  24  |  0.84    Ca    8.5      11 Mar 2022 05:59  Phos  3.6     03-11  Mg     1.5     03-11    TPro  6.5  /  Alb  3.0<L>  /  TBili  8.5<H>  /  DBili  x   /  AST  74<H>  /  ALT  52  /  AlkPhos  115  03-11    LIVER FUNCTIONS - ( 11 Mar 2022 05:59 )  Alb: 3.0 g/dL / Pro: 6.5 g/dL / ALK PHOS: 115 U/L / ALT: 52 U/L DA / AST: 74 U/L / GGT: x             ____________________________________________    IMAGING:    ______________________________________________________________________  ASSESSMENT:  41y Male    PLAN:

## 2022-03-11 NOTE — PROGRESS NOTE ADULT - PROBLEM SELECTOR PLAN 1
H/H stable Patient with known sickle cell disease. Baseline hemoglobin around 5 to 6.   On Hydromorphone IV  Pain management consulted  [facility]. Dr. Jordan following   IV fluids

## 2022-03-11 NOTE — PATIENT PROFILE ADULT - FALL HARM RISK - UNIVERSAL INTERVENTIONS
Bed in lowest position, wheels locked, appropriate side rails in place/Call bell, personal items and telephone in reach/Instruct patient to call for assistance before getting out of bed or chair/Non-slip footwear when patient is out of bed/Theodore to call system/Physically safe environment - no spills, clutter or unnecessary equipment/Purposeful Proactive Rounding/Room/bathroom lighting operational, light cord in reach

## 2022-03-11 NOTE — H&P ADULT - PROBLEM SELECTOR PLAN 3
- H&H 6.5/18.6  - Baseline Hb >5  - Will hold off transfusions for now   - Will keep monitoring CBC - WBCs 14K, Low BP, Fever >101  - Will start IV antibiotics   - Blood culture sent   - Will monitor vitals   - Tylenol PRN for pain - WBCs 14K, Low BP, Fever >101  - Could be due to Pyelonephritis??  - Will start IV antibiotics   - Blood culture sent   - Will monitor vitals   - Tylenol PRN for pain

## 2022-03-11 NOTE — CONSULT NOTE ADULT - PROBLEM SELECTOR RECOMMENDATION 9
Pt with acute sickle cell crisis.  Retic count > 11. h/h low. Pt on oxycodone 30mg at home.  Mild pain - pain level 1-3  nonpharmacological measures  ice packs, heat packs, PT/OOB, guided imagery, distraction   Nonopioid recc  - + liver cirrhosis - caution use of acetaminophen  - no nsaids  Opioid Recc  - hydromorphone 3mg ivp q 4 hours prn severe pain  Bowel Regimen  - senna daily  OOB  ivf
Patient endorses abdominal pain with N&V. Since admission his symptoms have improved. T A/p showed No acute abdominal pathology. Hepatic morphology is nonspecific but can be seen with underlying cirrhosis. Enlarged upper abdominal lymph nodes are nonspecific and can be seen in the setting of portal hypertension. It seems patient symptoms is likely due to gastritis.   -increase PPI to BID  -noted to have enlarged lymph nodes on CT. Would recommend outpatient GI follow up for EGD  -pain management   -advance diet as tolerated

## 2022-03-11 NOTE — H&P ADULT - NSICDXFAMILYHX_GEN_ALL_CORE_FT
Addended by: MARILYN ALEXANDRA on: 12/8/2020 12:42 PM     Modules accepted: Cris, SmartSet    
FAMILY HISTORY:  Father  Still living? Unknown  Family history of sickle cell trait, Age at diagnosis: Age Unknown    Mother  Still living? Unknown  Family history of sickle cell trait, Age at diagnosis: Age Unknown

## 2022-03-11 NOTE — H&P ADULT - ASSESSMENT
Patient is a 40 y/o with gout and SCD recently hospitalized 2/24-2/26, 2/28-3/7 for pain crisis presenting with 5 hours of new pain, all over, primarily in abdomen and bilious vomiting. Patient reports he was feeling okay since discharge a few days ago until today at noon when he began having pain and took oxy. The pain progressed and he began vomiting and having diarrhea. CT abdomen IV contrast showed Dilated retrocecal appendix, with small amount of free fluid within the     right upper quadrant hepatorenal space. Surgery was consulted by ED. Patient will be admitted for sickle cell crisis needs IV fluids and Pain meds.

## 2022-03-11 NOTE — H&P ADULT - PROBLEM SELECTOR PLAN 1
- Patient with hx of sickle cell with previous admissions of sickle crisis came due to abdominal pain and vomiting   - H&H 6.5/18.6  - Will start IV fluids 0.9% NS 125cc/hr   - Pain medications ( Tylenol, Toradol, Oxycodone )  - Dr. Jordan on board - Patient with hx of sickle cell with previous admissions of sickle crisis came due to abdominal pain and vomiting   - H&H 6.5/18.6  - Will start IV fluids 0.9% NS 125cc/hr   - Pain medications ( Tylenol, Toradol, Oxycodone )  - Dr. Jordan is on board

## 2022-03-11 NOTE — PROGRESS NOTE ADULT - ASSESSMENT
41 year old male with SSD, HB 5-6 at baseline    -CT abd/pelvis images reviewed, no acute surgical pathology   -Pain medication PRN   -GI eval   -Hem/Onc f/u   -Care as per medical team   -Reconsult as needed

## 2022-03-11 NOTE — CONSULT NOTE ADULT - PROBLEM SELECTOR RECOMMENDATION 2
Noticed elevated LFT. This has been worked up since last admission. Possibly DILI Vs sickle cell hepatic crisis. He did not get the chance to make an appointment with Dr. Tristan.   -Recommend f/u with Dr. Tristan  -Trend LFT  -Supportive care

## 2022-03-11 NOTE — PROGRESS NOTE ADULT - PROBLEM SELECTOR PLAN 2
NPO and advanced to Regular diet   IV fluids  PPI, Carafate  Heme/Onc  Dr. Jordan following  C.Diff and stool cultures ordered   CT abd/pelvis images reviewed, no acute surgical pathology NPO and advanced to Regular diet   IV fluids  PPI, Carafate  Heme/Onc  Dr. Jordan following  C.Diff and stool cultures ordered   CT abd/pelvis images reviewed, no acute surgical pathology  Bcx  Ucx  GI Consulted

## 2022-03-11 NOTE — H&P ADULT - HISTORY OF PRESENT ILLNESS
Pt. is a 40 y/o with gout and SCD recently hospitalized 2/24-2/26, 2/28-3/7 for pain crisis presenting with 5 hours of new pain, all over, primarily in abdomen and bilious vomiting. Pt reports he was feeling okay since discharge a few days ago until today at noon when he began having pain and took oxy. The pain progressed and he began vomiting and having diarrhea. Pt reports that he feels cold. Pt reports pain all over his body. Patient is a 42 y/o with gout and SCD recently hospitalized 2/24-2/26, 2/28-3/7 for pain crisis presenting with 5 hours of new pain, all over, primarily in abdomen and bilious vomiting. Patient reports he was feeling okay since discharge a few days ago until today at noon when he began having pain and took oxy. The pain progressed and he began vomiting and having diarrhea. Pt reports that he feels cold. Pt reports pain all over his body.

## 2022-03-11 NOTE — H&P ADULT - ATTENDING COMMENTS
Notified at 9:15 am 3.10.22 patient is to be assigned from another provider to the hospitalist service. Patient seen and evaluated a bedside on [address]. Plan of care discussed with NP Stephani Pettit    Briefly, this is a 42 y/o male with pmhx of sickle cell disease with multiple recent admission s(2/24-2/26, 2/28-3/7) presents with diffuse body pain more prominent at back pain and epigastric abd pain, nausea/vomiting.  Patient reports several weeks of epigastric/upper abdominal pain- notably present during prior admissions.  Body and back pain consistent with prior sickle cell pain crisis. Pain radiates from epigastrium to mid back. Reports multiple episodes of diarrhea over past 1-2 days. No sick contacts, travel, ingestion of spoiled food. Inhaled THC use daily since aged 12. Denies dysuria, hematuria, suprapubic pain/ frequency. Baseline Hgb typically in the 6 range    Physical jaundice nontoxic appearing    CT abd/pelvis reviewed from last evening revealing possible cirrhosis and enlarged upper abd lymph nodes which may be seen in portal hypertension. No other acute findings noted.    Surgery was consulted from the ED based CT abd findings from 3/5/2022 with concern for abnormalities not seen in the most recent study    1. Sickle Cell with painful acute vasoocclusive crisis  -IVF hydration  -pain control, pain management consult  -serial CBC, follow up with Dr. Jordan    2. Systemic Inflammatory Response  3. possible PUD/duodenal, gastritis, esophagitis  4. possible component of enteritis  scheduled low dose viscous lidocaine x 24 hours. Defer mylanta/MOM due to reported diarrhea  sucralfate + PPI  discontinue ordered toradol  -monitor clinically/ abdominal exam. May consider cipro-flagyl pending progress. Defer further abx for now--> f/u blood/urine cultures  send stool cx/ PCR/ C diff  -CT abd/pelvis result reviewed. Surgery called by ED staff--> no acute interventions. Will discuss with gastroenterology consult    5. suspected Cirrhosis, portal hypertension, abnormal LFTs  previously referred to hepatology outpt however was unable to make to appts due to repeated hospitalization Notified at 9:15 am 3.11.22 patient is to be assigned from another provider to the hospitalist service. Patient seen and evaluated a bedside on [address]. Plan of care discussed with NP Stephani Pettit    Briefly, this is a 40 y/o male with pmhx of sickle cell disease with multiple recent admission s(2/24-2/26, 2/28-3/7) presents with diffuse body pain more prominent at back pain and epigastric abd pain, nausea/vomiting.  Patient reports several weeks of epigastric/upper abdominal pain- notably present during prior admissions.  Body and back pain consistent with prior sickle cell pain crisis. Pain radiates from epigastrium to mid back. Reports multiple episodes of diarrhea over past 1-2 days. No sick contacts, travel, ingestion of spoiled food. Inhaled THC use daily since aged 12. Denies dysuria, hematuria, suprapubic pain/ frequency. Baseline Hgb typically in the 6 range    Physical jaundice nontoxic appearing  scleral icterus  CTAB/l no accessory muscle use  soft, hypoactive bowel sounds, epigastric and RUQ tenderness    CT abd/pelvis reviewed from last evening revealing possible cirrhosis and enlarged upper abd lymph nodes which may be seen in portal hypertension. No other acute findings noted.    Surgery was consulted from the ED based CT abd findings from 3/5/2022 with concern for abnormalities not seen in the most recent study    1. Sickle Cell with painful acute vasoocclusive crisis  -IVF hydration  -pain control, pain management consult  -serial CBC, follow up with Dr. Jordan    2. Systemic Inflammatory Response  3. possible PUD/duodenal, gastritis, esophagitis  4. diarrhea, possible component of enteritis  scheduled low dose viscous lidocaine x 24 hours. Defer mylanta/MOM due to reported diarrhea  sucralfate + PPI  discontinue ordered toradol  -monitor clinically/ abdominal exam. May consider cipro-flagyl pending progress. Defer further abx for now--> f/u blood/urine cultures  send stool cx/ PCR/ C diff  -CT abd/pelvis result reviewed. Surgery called by ED staff--> no acute interventions. Will discuss with gastroenterology consult    5. suspected Cirrhosis, portal hypertension, abnormal LFTs  previously referred to hepatology outpt however was unable to make to appts due to repeated hospitalization

## 2022-03-11 NOTE — PATIENT PROFILE ADULT - DO YOU LACK THE NECESSARY SUPPORT TO HELP YOU COPE WITH LIFE CHALLENGES?
Patient was seen in [facility] today. States he will contact us tomorrow if his fever does not go away.   no

## 2022-03-11 NOTE — H&P ADULT - PROBLEM SELECTOR PLAN 2
- WBCs 14K, Low BP, Fever >101  - Will start IV antibiotics   - Blood culture sent   - Will monitor vitals   - Tylenol PRN for pain - Patient with sickle cell crisis came with abdominal pain with diarrhea   - CT abdomen IV contrast showed Dilated retrocecal appendix, with small amount of free fluid within the     right upper quadrant hepatorenal space.  - Surgery is following and recommend Repeat CT scan   - Also it showed possible pyelonephritis so will start on Rocephin IV as well   - f/u CT abdomen   - IV fluids and Abx

## 2022-03-11 NOTE — PROGRESS NOTE ADULT - ASSESSMENT
40 y/o with gout and SCD recently hospitalized 2/24-2/26, 2/28-3/7 for pain crisis presenting with 5 hours of new pain, all over, primarily in abdomen and bilious vomiting. Patient reports he was feeling okay since discharge a few days ago until today at noon when he began having pain and took oxy. The pain progressed and he began vomiting and having diarrhea. Pt reports that he feels cold. Pt reports pain all over his body. (11 Mar 2022 01:54) now the pain is mainly at epigastric area like last admission.  He smokes pot.  Hed has fever 100.4 in ER.  HemeOnc Dr. Jordan, Surgery Dr. De La O, GI House and Pain management was consulted. Repeat CT scan to r/o appendicitis was negative and surgical intervention was not warranted.

## 2022-03-11 NOTE — PROGRESS NOTE ADULT - SUBJECTIVE AND OBJECTIVE BOX
INTERVAL HPI/OVERNIGHT EVENTS:    Pt seen and examined at bedside. Admits to epigastric pain and back pain, nausea, no vomiting.   Pt is NPO    Vital Signs Last 24 Hrs  T(C): 37.2 (11 Mar 2022 05:43), Max: 38.8 (10 Mar 2022 21:19)  T(F): 98.9 (11 Mar 2022 05:43), Max: 101.9 (10 Mar 2022 21:19)  HR: 86 (11 Mar 2022 04:20) (82 - 101)  BP: 138/68 (11 Mar 2022 05:43) (129/72 - 155/91)  BP(mean): --  RR: 18 (11 Mar 2022 05:43) (16 - 18)  SpO2: 95% (11 Mar 2022 05:43) (94% - 97%)  I&O's Detail    pantoprazole    Tablet 40 milliGRAM(s) Oral daily  senna 2 Tablet(s) Oral at bedtime  sucralfate 1 Gram(s) Oral four times a day      Physical Exam  General: AAOx3, No acute distress  Skin: No jaundice, no icterus  Abdomen: soft, nondistended, epigastric TTP, no rebound tenderness, no guarding, no palpable masses  Extremities: non edematous, no calf pain bilaterally        Labs:                        6.3    11.00 )-----------( 334      ( 11 Mar 2022 05:59 )             18.9     03-11    138  |  110<H>  |  10  ----------------------------<  95  4.4   |  24  |  0.84    Ca    8.5      11 Mar 2022 05:59  Phos  3.6     03-11  Mg     1.5     03-11    TPro  6.5  /  Alb  3.0<L>  /  TBili  8.5<H>  /  DBili  x   /  AST  74<H>  /  ALT  52  /  AlkPhos  115  03-11        RADIOLOGY & ADDITIONAL STUDIES:  < from: CT Abdomen and Pelvis w/ IV Cont (03.10.22 @ 20:17) >  PROCEDURE:  CT of the Abdomen and Pelvis was performed.  Sagittal and coronal reformats were performed.    FINDINGS:  LOWER CHEST: Cardiomegaly. Bibasilar linear type atelectasis.    LIVER: Prominent hepatorenal notching and hypertrophy lateral segment of   the left hepatic lobe. Findings can be seen with cirrhosis. Periportal   edema.  BILE DUCTS: Normal caliber.  GALLBLADDER: Cholecystectomy.  SPLEEN: Jason infarcted calcified spleen.  PANCREAS: Within normal limits.  ADRENALS: Within normal limits.  KIDNEYS/URETERS: Left renal cyst. Symmetric enhancement. Partial left   renal duplication. No hydronephrosis.    BLADDER: Within normal limits.  REPRODUCTIVE ORGANS: Prostate within normal limits.    BOWEL: No bowel obstruction. Appendix is normal.  PERITONEUM: No ascites.  VESSELS: Within normal limits.  RETROPERITONEUM/LYMPH NODES: Stable enlarged upper abdominal lymph nodes   in the gastrohepatic, periportal, and portacaval regions, nonspecific.  ABDOMINAL WALL: Small fat-containing left inguinal hernia.  BONES: Stable moderate T12 and mild to moderate L1 compression   deformities..    IMPRESSION:  No acute abdominal pathology.    Hepatic morphology is nonspecific but can be seen with underlying   cirrhosis.    Enlarged upper abdominal lymph nodes are nonspecific and can be seen in   the setting of portal hypertension.        --- End of Report ---    < end of copied text >

## 2022-03-11 NOTE — CONSULT NOTE ADULT - ATTENDING COMMENTS
- N/v, abd pain.    Patient seen and examined. Presenting with acute n/v, abd pain. Now improving, tolerating solid food. Ddx includes gastritis, PUD. In light of some perigastric adenopathy on CT, warrants EGD as outpatient. Advance diet as tolerated. Increase PPI to BID empirically.

## 2022-03-12 LAB
ALBUMIN SERPL ELPH-MCNC: 2.9 G/DL — LOW (ref 3.5–5)
ALP SERPL-CCNC: 104 U/L — SIGNIFICANT CHANGE UP (ref 40–120)
ALT FLD-CCNC: 87 U/L DA — HIGH (ref 10–60)
ANION GAP SERPL CALC-SCNC: 6 MMOL/L — SIGNIFICANT CHANGE UP (ref 5–17)
AST SERPL-CCNC: 129 U/L — HIGH (ref 10–40)
BILIRUB DIRECT SERPL-MCNC: 4.2 MG/DL — HIGH (ref 0–0.3)
BILIRUB INDIRECT FLD-MCNC: 3.7 MG/DL — HIGH (ref 0.2–1)
BILIRUB SERPL-MCNC: 7.9 MG/DL — HIGH (ref 0.2–1.2)
BUN SERPL-MCNC: 10 MG/DL — SIGNIFICANT CHANGE UP (ref 7–18)
C DIFF BY PCR RESULT: SIGNIFICANT CHANGE UP
C DIFF TOX GENS STL QL NAA+PROBE: SIGNIFICANT CHANGE UP
CALCIUM SERPL-MCNC: 8.5 MG/DL — SIGNIFICANT CHANGE UP (ref 8.4–10.5)
CHLORIDE SERPL-SCNC: 110 MMOL/L — HIGH (ref 96–108)
CO2 SERPL-SCNC: 23 MMOL/L — SIGNIFICANT CHANGE UP (ref 22–31)
CREAT SERPL-MCNC: 0.7 MG/DL — SIGNIFICANT CHANGE UP (ref 0.5–1.3)
EGFR: 118 ML/MIN/1.73M2 — SIGNIFICANT CHANGE UP
GLUCOSE SERPL-MCNC: 83 MG/DL — SIGNIFICANT CHANGE UP (ref 70–99)
HCT VFR BLD CALC: 16.5 % — CRITICAL LOW (ref 39–50)
HGB BLD-MCNC: 5.7 G/DL — CRITICAL LOW (ref 13–17)
MAGNESIUM SERPL-MCNC: 1.9 MG/DL — SIGNIFICANT CHANGE UP (ref 1.6–2.6)
MCHC RBC-ENTMCNC: 31.8 PG — SIGNIFICANT CHANGE UP (ref 27–34)
MCHC RBC-ENTMCNC: 34.5 GM/DL — SIGNIFICANT CHANGE UP (ref 32–36)
MCV RBC AUTO: 92.2 FL — SIGNIFICANT CHANGE UP (ref 80–100)
NRBC # BLD: 0 /100 WBCS — SIGNIFICANT CHANGE UP (ref 0–0)
PLATELET # BLD AUTO: 282 K/UL — SIGNIFICANT CHANGE UP (ref 150–400)
POTASSIUM SERPL-MCNC: 3.9 MMOL/L — SIGNIFICANT CHANGE UP (ref 3.5–5.3)
POTASSIUM SERPL-SCNC: 3.9 MMOL/L — SIGNIFICANT CHANGE UP (ref 3.5–5.3)
PROT SERPL-MCNC: 6.3 G/DL — SIGNIFICANT CHANGE UP (ref 6–8.3)
RBC # BLD: 1.79 M/UL — LOW (ref 4.2–5.8)
RBC # BLD: 1.79 M/UL — LOW (ref 4.2–5.8)
RBC # FLD: 18 % — HIGH (ref 10.3–14.5)
RETICS #: 269.6 K/UL — HIGH (ref 25–125)
RETICS/RBC NFR: 15.1 % — HIGH (ref 0.5–2.5)
SODIUM SERPL-SCNC: 139 MMOL/L — SIGNIFICANT CHANGE UP (ref 135–145)
WBC # BLD: 7.49 K/UL — SIGNIFICANT CHANGE UP (ref 3.8–10.5)
WBC # FLD AUTO: 7.49 K/UL — SIGNIFICANT CHANGE UP (ref 3.8–10.5)

## 2022-03-12 PROCEDURE — 99233 SBSQ HOSP IP/OBS HIGH 50: CPT

## 2022-03-12 PROCEDURE — 99222 1ST HOSP IP/OBS MODERATE 55: CPT

## 2022-03-12 RX ORDER — FOLIC ACID 0.8 MG
1 TABLET ORAL
Refills: 0 | Status: DISCONTINUED | OUTPATIENT
Start: 2022-03-12 | End: 2022-03-17

## 2022-03-12 RX ORDER — HYDROMORPHONE HYDROCHLORIDE 2 MG/ML
2 INJECTION INTRAMUSCULAR; INTRAVENOUS; SUBCUTANEOUS
Refills: 0 | Status: DISCONTINUED | OUTPATIENT
Start: 2022-03-12 | End: 2022-03-15

## 2022-03-12 RX ADMIN — HYDROMORPHONE HYDROCHLORIDE 3 MILLIGRAM(S): 2 INJECTION INTRAMUSCULAR; INTRAVENOUS; SUBCUTANEOUS at 09:45

## 2022-03-12 RX ADMIN — HEPARIN SODIUM 5000 UNIT(S): 5000 INJECTION INTRAVENOUS; SUBCUTANEOUS at 05:20

## 2022-03-12 RX ADMIN — HYDROMORPHONE HYDROCHLORIDE 2 MILLIGRAM(S): 2 INJECTION INTRAMUSCULAR; INTRAVENOUS; SUBCUTANEOUS at 20:03

## 2022-03-12 RX ADMIN — LIDOCAINE 5 MILLILITER(S): 4 CREAM TOPICAL at 05:21

## 2022-03-12 RX ADMIN — HYDROMORPHONE HYDROCHLORIDE 3 MILLIGRAM(S): 2 INJECTION INTRAMUSCULAR; INTRAVENOUS; SUBCUTANEOUS at 00:30

## 2022-03-12 RX ADMIN — HYDROMORPHONE HYDROCHLORIDE 3 MILLIGRAM(S): 2 INJECTION INTRAMUSCULAR; INTRAVENOUS; SUBCUTANEOUS at 00:17

## 2022-03-12 RX ADMIN — HYDROMORPHONE HYDROCHLORIDE 2 MILLIGRAM(S): 2 INJECTION INTRAMUSCULAR; INTRAVENOUS; SUBCUTANEOUS at 16:46

## 2022-03-12 RX ADMIN — HYDROMORPHONE HYDROCHLORIDE 2 MILLIGRAM(S): 2 INJECTION INTRAMUSCULAR; INTRAVENOUS; SUBCUTANEOUS at 13:21

## 2022-03-12 RX ADMIN — Medication 1 MILLIGRAM(S): at 18:13

## 2022-03-12 RX ADMIN — HEPARIN SODIUM 5000 UNIT(S): 5000 INJECTION INTRAVENOUS; SUBCUTANEOUS at 13:21

## 2022-03-12 RX ADMIN — Medication 1 GRAM(S): at 11:00

## 2022-03-12 RX ADMIN — Medication 1 MILLIGRAM(S): at 11:01

## 2022-03-12 RX ADMIN — SODIUM CHLORIDE 125 MILLILITER(S): 9 INJECTION INTRAMUSCULAR; INTRAVENOUS; SUBCUTANEOUS at 05:16

## 2022-03-12 RX ADMIN — HYDROMORPHONE HYDROCHLORIDE 2 MILLIGRAM(S): 2 INJECTION INTRAMUSCULAR; INTRAVENOUS; SUBCUTANEOUS at 20:33

## 2022-03-12 RX ADMIN — Medication 1 GRAM(S): at 00:07

## 2022-03-12 RX ADMIN — HYDROMORPHONE HYDROCHLORIDE 2 MILLIGRAM(S): 2 INJECTION INTRAMUSCULAR; INTRAVENOUS; SUBCUTANEOUS at 13:40

## 2022-03-12 RX ADMIN — Medication 1 GRAM(S): at 18:13

## 2022-03-12 RX ADMIN — PANTOPRAZOLE SODIUM 40 MILLIGRAM(S): 20 TABLET, DELAYED RELEASE ORAL at 18:13

## 2022-03-12 RX ADMIN — SENNA PLUS 2 TABLET(S): 8.6 TABLET ORAL at 21:45

## 2022-03-12 RX ADMIN — HYDROMORPHONE HYDROCHLORIDE 3 MILLIGRAM(S): 2 INJECTION INTRAMUSCULAR; INTRAVENOUS; SUBCUTANEOUS at 06:03

## 2022-03-12 RX ADMIN — HEPARIN SODIUM 5000 UNIT(S): 5000 INJECTION INTRAVENOUS; SUBCUTANEOUS at 21:45

## 2022-03-12 RX ADMIN — HYDROMORPHONE HYDROCHLORIDE 2 MILLIGRAM(S): 2 INJECTION INTRAMUSCULAR; INTRAVENOUS; SUBCUTANEOUS at 16:21

## 2022-03-12 RX ADMIN — HYDROMORPHONE HYDROCHLORIDE 2 MILLIGRAM(S): 2 INJECTION INTRAMUSCULAR; INTRAVENOUS; SUBCUTANEOUS at 23:40

## 2022-03-12 RX ADMIN — SODIUM CHLORIDE 125 MILLILITER(S): 9 INJECTION INTRAMUSCULAR; INTRAVENOUS; SUBCUTANEOUS at 11:00

## 2022-03-12 RX ADMIN — HYDROMORPHONE HYDROCHLORIDE 2 MILLIGRAM(S): 2 INJECTION INTRAMUSCULAR; INTRAVENOUS; SUBCUTANEOUS at 23:11

## 2022-03-12 RX ADMIN — PANTOPRAZOLE SODIUM 40 MILLIGRAM(S): 20 TABLET, DELAYED RELEASE ORAL at 05:16

## 2022-03-12 RX ADMIN — HYDROMORPHONE HYDROCHLORIDE 3 MILLIGRAM(S): 2 INJECTION INTRAMUSCULAR; INTRAVENOUS; SUBCUTANEOUS at 09:26

## 2022-03-12 RX ADMIN — Medication 0.6 MILLIGRAM(S): at 11:01

## 2022-03-12 RX ADMIN — Medication 1 GRAM(S): at 05:16

## 2022-03-12 RX ADMIN — HYDROMORPHONE HYDROCHLORIDE 3 MILLIGRAM(S): 2 INJECTION INTRAMUSCULAR; INTRAVENOUS; SUBCUTANEOUS at 05:20

## 2022-03-12 NOTE — CHART NOTE - NSCHARTNOTEFT_GEN_A_CORE
EVENT:    SUBJECTIVE:    OBJECTIVE:  Vital Signs Last 24 Hrs  T(C): 36.9 (12 Mar 2022 05:28), Max: 37.2 (11 Mar 2022 19:24)  T(F): 98.4 (12 Mar 2022 05:28), Max: 99 (11 Mar 2022 19:24)  HR: 78 (12 Mar 2022 05:28) (76 - 78)  BP: 127/71 (12 Mar 2022 05:28) (127/71 - 144/82)  BP(mean): --  RR: 18 (12 Mar 2022 05:28) (18 - 18)  SpO2: 96% (12 Mar 2022 05:28) (95% - 96%)    LABS:                        5.7    7.49  )-----------( 282      ( 12 Mar 2022 07:04 )             16.5     03-12    139  |  110<H>  |  10  ----------------------------<  83  3.9   |  23  |  0.70    Ca    8.5      12 Mar 2022 07:04  Phos  3.6     03-11  Mg     1.9     03-12    TPro  6.5  /  Alb  3.0<L>  /  TBili  8.5<H>  /  DBili  x   /  AST  74<H>  /  ALT  52  /  AlkPhos  115  03-11      EKG:   IMGAGING:    ASSESSMENT:  HPI:  Patient is a 40 y/o with gout and SCD recently hospitalized 2/24-2/26, 2/28-3/7 for pain crisis presenting with 5 hours of new pain, all over, primarily in abdomen and bilious vomiting. Patient reports he was feeling okay since discharge a few days ago until today at noon when he began having pain and took oxy. The pain progressed and he began vomiting and having diarrhea. Pt reports that he feels cold. Pt reports pain all over his body. (11 Mar 2022 01:54)      PLAN: EVENT:Notified by RN of severe anemia , Hgb 5.7    SUBJECTIVE:Pt seen and examined this morning. Pt awake, endorses his baseline Hgb  is betw 5 to 6. Pt endorses overall feeling better,  low back pain 8/10. Pt denies dizziness, SOB, chest palpitations, numbness/tingling to bilat low extremities, N/V/abd discomfort, overt bleeding.     OBJECTIVE:  Vital Signs Last 24 Hrs  T(C): 36.9 (12 Mar 2022 05:28), Max: 37.2 (11 Mar 2022 19:24)  T(F): 98.4 (12 Mar 2022 05:28), Max: 99 (11 Mar 2022 19:24)  HR: 78 (12 Mar 2022 05:28) (76 - 78)  BP: 127/71 (12 Mar 2022 05:28) (127/71 - 144/82)  BP(mean): --  RR: 18 (12 Mar 2022 05:28) (18 - 18)  SpO2: 96% (12 Mar 2022 05:28) (95% - 96%)    MEDICATIONS  (STANDING):  colchicine 0.6 milliGRAM(s) Oral daily  folic acid 1 milliGRAM(s) Oral daily  heparin   Injectable 5000 Unit(s) SubCutaneous every 8 hours  influenza   Vaccine 0.5 milliLiter(s) IntraMuscular once  lidocaine 2% Viscous 5 milliLiter(s) Oral every 8 hours  pantoprazole    Tablet 40 milliGRAM(s) Oral two times a day  senna 2 Tablet(s) Oral at bedtime  sodium chloride 0.9%. 1000 milliLiter(s) (125 mL/Hr) IV Continuous <Continuous>  sucralfate 1 Gram(s) Oral four times a day    MEDICATIONS  (PRN):  acetaminophen     Tablet .. 650 milliGRAM(s) Oral every 6 hours PRN Temp greater or equal to 38C (100.4F), Mild Pain (1 - 3)  HYDROmorphone  Injectable 3 milliGRAM(s) IV Push every 4 hours PRN Severe Pain (7 - 10)    ------------  Physical exam:  Gen: NAD  HEENT: PERRL, anicteric, no oral mucositis  Resp: Clear breath sounds on auscultation. No rales, no wheezing  CVS: S1S2 present, regular  GI: BS(+), Soft, ND, NT  Extremities : BLE No edema or calf tenderness. PPP  Neuro: Awake/alert.  no new neuro deficits  Skin: warm,dry,no rash        LABS:                        5.7    7.49  )-----------( 282      ( 12 Mar 2022 07:04 )             16.5     03-12    139  |  110<H>  |  10  ----------------------------<  83  3.9   |  23  |  0.70    Ca    8.5      12 Mar 2022 07:04  Phos  3.6     03-11  Mg     1.9     03-12    TPro  6.5  /  Alb  3.0<L>  /  TBili  8.5<H>  /  DBili  x   /  AST  74<H>  /  ALT  52  /  AlkPhos  115  03-11    < from: Xray Chest 1 View-PORTABLE IMMEDIATE (Xray Chest 1 View-PORTABLE IMMEDIATE .) (03.10.22 @ 22:08) >    COMPARISON: 10/28/2021 .    FINDINGS:  CATHETERS AND TUBES: Mediport catheter tip in SVC.    PULMONARY: The visualized lungs are clear of airspace consolidations or   effusions.   No pneumothorax.    HEART/VASCULAR: The  heart is enlarged in transverse diameter. .    BONES: Visualized osseous structures are intact.    IMPRESSION:   No radiographic evidence of active chest disease.    < end of copied text >    < from: CT Abdomen and Pelvis w/ IV Cont (03.10.22 @ 20:17) >      IMPRESSION:  No acute abdominal pathology.    < end of copied text >        ASSESSMENT:  HPI:  Patient is a 40 y/o with gout and SCD recently hospitalized 2/24-2/26, 2/28-3/7 for pain crisis presenting with 5 hours of new pain, all over, primarily in abdomen and bilious vomiting. Patient reports he was feeling okay since discharge a few days ago until today at noon when he began having pain and took oxy. The pain progressed and he began vomiting and having diarrhea. Pt reports that he feels cold. Pt reports pain all over his body. (11 Mar 2022 01:54)  Pain is mainly at epigastric area like last admission.  He smokes pot.  Fever 100.4 in ER.  Pt is admitted with Sickle cell crisis.   HemeOnc Dr. Jordan, Surgery Dr. Ranev, GI House and Pain management was consulted. Repeat CT scan to r/o appendicitis was negative and surgical intervention was not warranted.    Now with severe anemia.     PLAN:  1. Severe Anemia   - Baseline Hgb 5 to 6.    -Monitor CBC.   -f/u HemeOnc Dr. Jordan    2. Low back pain likely due to Sickle cell crisis.     -Continue pain control     -f/u with Pain mgmt     D/w pt, RN, attending.     Anjali Fernández, NP Medicine   [phone]

## 2022-03-12 NOTE — PROGRESS NOTE ADULT - SUBJECTIVE AND OBJECTIVE BOX
Briefly, this is a 42 y/o male with pmhx of sickle cell disease with multiple recent admission s(2/24-2/26, 2/28-3/7) presents with diffuse body pain more prominent at back pain and epigastric abd pain, nausea/vomiting.  Patient reports several weeks of epigastric/upper abdominal pain- notably present during prior admissions.  Body and back pain consistent with prior sickle cell pain crisis. Pain radiates from epigastrium to mid back. Reports multiple episodes of diarrhea over past 1-2 days. No sick contacts, travel, ingestion of spoiled food. Inhaled THC use daily since aged 12. Denies dysuria, hematuria, suprapubic pain/ frequency. Baseline Hgb typically in the 6 range      Vital Signs Last 24 Hrs  T(C): 36.8 (12 Mar 2022 13:00), Max: 37.2 (11 Mar 2022 19:24)  T(F): 98.3 (12 Mar 2022 13:00), Max: 99 (11 Mar 2022 19:24)  HR: 88 (12 Mar 2022 13:00) (78 - 88)  BP: 129/67 (12 Mar 2022 13:00) (127/71 - 135/69)  RR: 18 (12 Mar 2022 13:00) (18 - 18)  SpO2: 92% (12 Mar 2022 13:00) (92% - 96%)    Physical jaundice nontoxic appearing  scleral icterus  CTAB/l no accessory muscle use  soft, hypoactive bowel sounds, epigastric and RUQ tenderness    CT abd/pelvis reviewed from last evening revealing possible cirrhosis and enlarged upper abd lymph nodes which may be seen in portal hypertension. No other acute findings noted.    Surgery was consulted from the ED based CT abd findings from 3/5/2022 with concern for abnormalities not seen in the most recent study     Patient seen and examined this afternoon    42 y/o male with PMH of sickle cell disease with multiple recent admission s(2/24-2/26, 2/28-3/7) presents with diffuse body pain more prominent at back pain and epigastric abd pain, nausea/vomiting. Body and back pain consistent with prior sickle cell pain crisis. Pain radiates from epigastrium to mid back. Reports multiple episodes of diarrhea over past 1-2 days which resolved prior to previousdischarges. Inhaled THC use daily since aged 12. Denies dysuria, hematuria, suprapubic pain/ frequency. Baseline Hgb typically in the 6 range    MEDICATIONS  (STANDING):  colchicine 0.6 milliGRAM(s) Oral daily  folic acid 1 milliGRAM(s) Oral <User Schedule>  heparin   Injectable 5000 Unit(s) SubCutaneous every 8 hours  influenza   Vaccine 0.5 milliLiter(s) IntraMuscular once  lidocaine 2% Viscous 5 milliLiter(s) Oral every 8 hours  pantoprazole    Tablet 40 milliGRAM(s) Oral two times a day  senna 2 Tablet(s) Oral at bedtime  sodium chloride 0.9%. 1000 milliLiter(s) (125 mL/Hr) IV Continuous <Continuous>  sucralfate 1 Gram(s) Oral four times a day    MEDICATIONS  (PRN):  acetaminophen     Tablet .. 650 milliGRAM(s) Oral every 6 hours PRN Temp greater or equal to 38C (100.4F), Mild Pain (1 - 3)  HYDROmorphone  Injectable 2 milliGRAM(s) IV Push every 3 hours PRN Severe Pain (7 - 10)      Vital Signs Last 24 Hrs  T(C): 36.8 (12 Mar 2022 13:00), Max: 37.2 (11 Mar 2022 19:24)  T(F): 98.3 (12 Mar 2022 13:00), Max: 99 (11 Mar 2022 19:24)  HR: 88 (12 Mar 2022 13:00) (78 - 88)  BP: 129/67 (12 Mar 2022 13:00) (127/71 - 135/69)  RR: 18 (12 Mar 2022 13:00) (18 - 18)  SpO2: 92% (12 Mar 2022 13:00) (92% - 96%)    Physical jaundice nontoxic appearing  scleral icterus  CTAB/l no accessory muscle use  soft, hypoactive bowel sounds, epigastric and RUQ tenderness    CT abd/pelvis reviewed from last evening revealing possible cirrhosis and enlarged upper abd lymph nodes which may be seen in portal hypertension. No other acute findings noted.    Surgery was consulted from the ED based CT abd findings from 3/5/2022 with concern for abnormalities not seen in the most recent study     Patient seen and examined this afternoon    40 y/o male with PMH of sickle cell disease with multiple recent admission s(2/24-2/26, 2/28-3/7) presents with diffuse body pain more prominent at back pain and epigastric abd pain, nausea/vomiting. Body and back pain consistent with prior sickle cell pain crisis. Pain radiates from epigastrium to mid back. Reports multiple episodes of diarrhea over past 1-2 days which resolved prior to previousdischarges. Inhaled THC use daily since aged 12. Denies dysuria, hematuria, suprapubic pain/ frequency. Baseline Hgb typically in the 6 range    MEDICATIONS  (STANDING):  colchicine 0.6 milliGRAM(s) Oral daily  folic acid 1 milliGRAM(s) Oral <User Schedule>  heparin   Injectable 5000 Unit(s) SubCutaneous every 8 hours  influenza   Vaccine 0.5 milliLiter(s) IntraMuscular once  lidocaine 2% Viscous 5 milliLiter(s) Oral every 8 hours  pantoprazole    Tablet 40 milliGRAM(s) Oral two times a day  senna 2 Tablet(s) Oral at bedtime  sodium chloride 0.9%. 1000 milliLiter(s) (125 mL/Hr) IV Continuous <Continuous>  sucralfate 1 Gram(s) Oral four times a day    MEDICATIONS  (PRN):  acetaminophen     Tablet .. 650 milliGRAM(s) Oral every 6 hours PRN Temp greater or equal to 38C (100.4F), Mild Pain (1 - 3)  HYDROmorphone  Injectable 2 milliGRAM(s) IV Push every 3 hours PRN Severe Pain (7 - 10)    Vital Signs Last 24 Hrs  T(C): 36.8 (12 Mar 2022 13:00), Max: 37.2 (11 Mar 2022 19:24)  T(F): 98.3 (12 Mar 2022 13:00), Max: 99 (11 Mar 2022 19:24)  HR: 88 (12 Mar 2022 13:00) (78 - 88)  BP: 129/67 (12 Mar 2022 13:00) (127/71 - 135/69)  RR: 18 (12 Mar 2022 13:00) (18 - 18)  SpO2: 92% (12 Mar 2022 13:00) (92% - 96%)    P/E:  Middle aged male comfortable in bed conversing on phone NAD  appears icteric slightly more than previous admission  Psych: AAO x3  Neuro: No gross focal deficits; Power and sensation intact  CVS: S1S2 present, regular, no edema  Resp: BLAE+, No wheeze or Rhonchi  GI: Soft, BS+, Mildly tender epigastric area, non distended  Extr: No  calf tenderness B/L Lower extremities  Skin: Warm and moist without any rashes      Labs:                        5.7    7.49  )-----------( 282      ( 12 Mar 2022 07:04 )             16.5   03-12    139  |  110<H>  |  10  ----------------------------<  83  3.9   |  23  |  0.70    Ca    8.5      12 Mar 2022 07:04  Phos  3.6     03-11  Mg     1.9     03-12    TPro  6.3  /  Alb  2.9<L>  /  TBili  7.9<H>  /  DBili  4.2<H>  /  AST  129<H>  /  ALT  87<H>  /  AlkPhos  104  03-12    CT Abdomen and Pelvis w/ IV Cont (03.10.22 @ 20:17)   FINDINGS:  LOWER CHEST: Cardiomegaly. Bibasilar linear type atelectasis.    LIVER: Prominent hepatorenal notching and hypertrophy lateral segment of the left hepatic lobe. Findings can be seen with cirrhosis. Periportal edema.  BILE DUCTS: Normal caliber.  GALLBLADDER: Cholecystectomy.  SPLEEN: Jason infarcted calcified spleen.  PANCREAS: Within normal limits.  ADRENALS: Within normal limits.  KIDNEYS/URETERS: Left renal cyst. Symmetric enhancement. Partial left renal duplication. No hydronephrosis.    BLADDER: Within normal limits.  REPRODUCTIVE ORGANS: Prostate within normal limits.    BOWEL: No bowel obstruction. Appendix is normal.  PERITONEUM: No ascites.  VESSELS: Within normal limits.  RETROPERITONEUM/LYMPH NODES: Stable enlarged upper abdominal lymph nodes in the gastrohepatic, periportal, and portacaval regions, nonspecific.  ABDOMINAL WALL: Small fat-containing left inguinal hernia.  BONES: Stable moderate T12 and mild to moderate L1 compression deformities..    IMPRESSION: No acute abdominal pathology.  Hepatic morphology is nonspecific but can be seen with underlying cirrhosis.    Enlarged upper abdominal lymph nodes are nonspecific and can be seen in the setting of portal hypertension.

## 2022-03-12 NOTE — PROGRESS NOTE ADULT - ASSESSMENT
1. Sickle Cell with painful acute vasoocclusive crisis  -IVF hydration  -pain control, pain management consult  -serial CBC, follow up with Dr. Jordan    2. Systemic Inflammatory Response  3. possible PUD/duodenal, gastritis, esophagitis  4. diarrhea, possible component of enteritis  scheduled low dose viscous lidocaine x 24 hours. Defer mylanta/MOM due to reported diarrhea  sucralfate + PPI  discontinue ordered toradol  -monitor clinically/ abdominal exam. May consider cipro-flagyl pending progress. Defer further abx for now--> f/u blood/urine cultures  send stool cx/ PCR/ C diff  -CT abd/pelvis result reviewed. Surgery called by ED staff--> no acute interventions. Will discuss with gastroenterology consult    5. suspected Cirrhosis, portal hypertension, abnormal LFTs  previously referred to hepatology outpt however was unable to make to appts due to repeated hospitalization.  1. Sickle Cell with painful acute vasoocclusive crisis  -Continue vigorousIVF hydration  -pain control, pain management f/u d/w NP Lora; pain seems controlled; will reduce to 2mg q 3 hrs Dilaudid  -serial CBC, follow up with Dr. Jordan    2. Systemic Inflammatory Response stable  3. Epigastric pain with nausea and vomit on repeated admission could be Cannabis Hyperemesis verus   possible PUD/duodenal, gastritis, esophagitis   D/C Lidocaine visus; patient does not feel comfortable with it  Continue sucralfate + PPI  4. diarrhea, possible component of enteritis resolved     Defer further abx for now--> f/u blood/urine cultures  send stool cx/ PCR/ C diff: Cdiff negative; D/C Conact isolation    5. Hyperbilirubinemia due to active sickling   monitor closely; GI f/u    6. suspected Cirrhosis, portal hypertension, abnormal LFTs  previously referred to hepatology outpt however was unable to make to appts due to repeated hospitalization.   GI f/u; consult appreciated; possible Endoscopy    7. Anemia due to active sickling  Baseline Hgb around 5.5 to 6; Hold off transfusions unless drops further or symptomatic    Discussed with Patient findings and plan of care  Discussed with RENNY Alba and YAHAIRA Castorena

## 2022-03-13 LAB
ANION GAP SERPL CALC-SCNC: 7 MMOL/L — SIGNIFICANT CHANGE UP (ref 5–17)
BUN SERPL-MCNC: 8 MG/DL — SIGNIFICANT CHANGE UP (ref 7–18)
CALCIUM SERPL-MCNC: 8 MG/DL — LOW (ref 8.4–10.5)
CHLORIDE SERPL-SCNC: 112 MMOL/L — HIGH (ref 96–108)
CO2 SERPL-SCNC: 23 MMOL/L — SIGNIFICANT CHANGE UP (ref 22–31)
CREAT SERPL-MCNC: 0.67 MG/DL — SIGNIFICANT CHANGE UP (ref 0.5–1.3)
CULTURE RESULTS: SIGNIFICANT CHANGE UP
EGFR: 120 ML/MIN/1.73M2 — SIGNIFICANT CHANGE UP
GLUCOSE SERPL-MCNC: 91 MG/DL — SIGNIFICANT CHANGE UP (ref 70–99)
HCT VFR BLD CALC: 15.4 % — CRITICAL LOW (ref 39–50)
HGB BLD-MCNC: 5.6 G/DL — CRITICAL LOW (ref 13–17)
MCHC RBC-ENTMCNC: 32.9 PG — SIGNIFICANT CHANGE UP (ref 27–34)
MCHC RBC-ENTMCNC: 36.4 GM/DL — HIGH (ref 32–36)
MCV RBC AUTO: 90.6 FL — SIGNIFICANT CHANGE UP (ref 80–100)
NRBC # BLD: 0 /100 WBCS — SIGNIFICANT CHANGE UP (ref 0–0)
PLATELET # BLD AUTO: 278 K/UL — SIGNIFICANT CHANGE UP (ref 150–400)
POTASSIUM SERPL-MCNC: 3.8 MMOL/L — SIGNIFICANT CHANGE UP (ref 3.5–5.3)
POTASSIUM SERPL-SCNC: 3.8 MMOL/L — SIGNIFICANT CHANGE UP (ref 3.5–5.3)
RBC # BLD: 1.7 M/UL — LOW (ref 4.2–5.8)
RBC # BLD: 1.7 M/UL — LOW (ref 4.2–5.8)
RBC # FLD: 18.6 % — HIGH (ref 10.3–14.5)
RETICS #: 263.2 K/UL — HIGH (ref 25–125)
RETICS/RBC NFR: 15.5 % — HIGH (ref 0.5–2.5)
SODIUM SERPL-SCNC: 142 MMOL/L — SIGNIFICANT CHANGE UP (ref 135–145)
SPECIMEN SOURCE: SIGNIFICANT CHANGE UP
WBC # BLD: 9.68 K/UL — SIGNIFICANT CHANGE UP (ref 3.8–10.5)
WBC # FLD AUTO: 9.68 K/UL — SIGNIFICANT CHANGE UP (ref 3.8–10.5)

## 2022-03-13 PROCEDURE — 99233 SBSQ HOSP IP/OBS HIGH 50: CPT

## 2022-03-13 RX ORDER — ONDANSETRON 8 MG/1
4 TABLET, FILM COATED ORAL ONCE
Refills: 0 | Status: COMPLETED | OUTPATIENT
Start: 2022-03-13 | End: 2022-03-13

## 2022-03-13 RX ADMIN — Medication 1 MILLIGRAM(S): at 22:20

## 2022-03-13 RX ADMIN — SENNA PLUS 2 TABLET(S): 8.6 TABLET ORAL at 22:20

## 2022-03-13 RX ADMIN — HYDROMORPHONE HYDROCHLORIDE 2 MILLIGRAM(S): 2 INJECTION INTRAMUSCULAR; INTRAVENOUS; SUBCUTANEOUS at 22:52

## 2022-03-13 RX ADMIN — HYDROMORPHONE HYDROCHLORIDE 2 MILLIGRAM(S): 2 INJECTION INTRAMUSCULAR; INTRAVENOUS; SUBCUTANEOUS at 04:25

## 2022-03-13 RX ADMIN — HEPARIN SODIUM 5000 UNIT(S): 5000 INJECTION INTRAVENOUS; SUBCUTANEOUS at 13:55

## 2022-03-13 RX ADMIN — HYDROMORPHONE HYDROCHLORIDE 2 MILLIGRAM(S): 2 INJECTION INTRAMUSCULAR; INTRAVENOUS; SUBCUTANEOUS at 04:05

## 2022-03-13 RX ADMIN — HYDROMORPHONE HYDROCHLORIDE 2 MILLIGRAM(S): 2 INJECTION INTRAMUSCULAR; INTRAVENOUS; SUBCUTANEOUS at 16:54

## 2022-03-13 RX ADMIN — PANTOPRAZOLE SODIUM 40 MILLIGRAM(S): 20 TABLET, DELAYED RELEASE ORAL at 05:16

## 2022-03-13 RX ADMIN — Medication 0.6 MILLIGRAM(S): at 11:44

## 2022-03-13 RX ADMIN — Medication 1 GRAM(S): at 23:15

## 2022-03-13 RX ADMIN — HYDROMORPHONE HYDROCHLORIDE 2 MILLIGRAM(S): 2 INJECTION INTRAMUSCULAR; INTRAVENOUS; SUBCUTANEOUS at 07:33

## 2022-03-13 RX ADMIN — HEPARIN SODIUM 5000 UNIT(S): 5000 INJECTION INTRAVENOUS; SUBCUTANEOUS at 05:17

## 2022-03-13 RX ADMIN — HYDROMORPHONE HYDROCHLORIDE 2 MILLIGRAM(S): 2 INJECTION INTRAMUSCULAR; INTRAVENOUS; SUBCUTANEOUS at 10:37

## 2022-03-13 RX ADMIN — ONDANSETRON 4 MILLIGRAM(S): 8 TABLET, FILM COATED ORAL at 08:16

## 2022-03-13 RX ADMIN — Medication 1 MILLIGRAM(S): at 17:46

## 2022-03-13 RX ADMIN — HEPARIN SODIUM 5000 UNIT(S): 5000 INJECTION INTRAVENOUS; SUBCUTANEOUS at 22:20

## 2022-03-13 RX ADMIN — HYDROMORPHONE HYDROCHLORIDE 2 MILLIGRAM(S): 2 INJECTION INTRAMUSCULAR; INTRAVENOUS; SUBCUTANEOUS at 14:10

## 2022-03-13 RX ADMIN — HYDROMORPHONE HYDROCHLORIDE 2 MILLIGRAM(S): 2 INJECTION INTRAMUSCULAR; INTRAVENOUS; SUBCUTANEOUS at 20:15

## 2022-03-13 RX ADMIN — Medication 1 MILLIGRAM(S): at 07:57

## 2022-03-13 RX ADMIN — Medication 1 GRAM(S): at 05:16

## 2022-03-13 RX ADMIN — Medication 1 GRAM(S): at 17:46

## 2022-03-13 RX ADMIN — HYDROMORPHONE HYDROCHLORIDE 2 MILLIGRAM(S): 2 INJECTION INTRAMUSCULAR; INTRAVENOUS; SUBCUTANEOUS at 07:10

## 2022-03-13 RX ADMIN — HYDROMORPHONE HYDROCHLORIDE 2 MILLIGRAM(S): 2 INJECTION INTRAMUSCULAR; INTRAVENOUS; SUBCUTANEOUS at 10:50

## 2022-03-13 RX ADMIN — HYDROMORPHONE HYDROCHLORIDE 2 MILLIGRAM(S): 2 INJECTION INTRAMUSCULAR; INTRAVENOUS; SUBCUTANEOUS at 19:57

## 2022-03-13 RX ADMIN — Medication 1 GRAM(S): at 11:44

## 2022-03-13 RX ADMIN — HYDROMORPHONE HYDROCHLORIDE 2 MILLIGRAM(S): 2 INJECTION INTRAMUSCULAR; INTRAVENOUS; SUBCUTANEOUS at 23:17

## 2022-03-13 RX ADMIN — Medication 1 MILLIGRAM(S): at 14:15

## 2022-03-13 RX ADMIN — HYDROMORPHONE HYDROCHLORIDE 2 MILLIGRAM(S): 2 INJECTION INTRAMUSCULAR; INTRAVENOUS; SUBCUTANEOUS at 17:47

## 2022-03-13 RX ADMIN — PANTOPRAZOLE SODIUM 40 MILLIGRAM(S): 20 TABLET, DELAYED RELEASE ORAL at 17:46

## 2022-03-13 RX ADMIN — HYDROMORPHONE HYDROCHLORIDE 2 MILLIGRAM(S): 2 INJECTION INTRAMUSCULAR; INTRAVENOUS; SUBCUTANEOUS at 13:53

## 2022-03-13 NOTE — PROGRESS NOTE ADULT - SUBJECTIVE AND OBJECTIVE BOX
Patient seen and examined this morning    40 y/o male with PMH of sickle cell disease with multiple recent admission s(2/24-2/26, 2/28-3/7) presents with diffuse body pain more prominent at back pain and epigastric abd pain, nausea/vomiting. Body and back pain consistent with prior sickle cell pain crisis. Pain radiates from epigastrium to mid back. Reports multiple episodes of diarrhea over past 1-2 days which resolved prior to previousdischarges. Inhaled THC use daily since aged 12. Denies dysuria, hematuria, suprapubic pain/ frequency. Baseline Hgb typically in the 6 range    Patient is doing well; comfortable in bed, not in acute distress, tolerating diet, appears jaundiced, abdominal pain improved yesterday but complained of nausea and vomit feeling today with some pain epigastric area.   denies fever, chills, SOB, palpitations, chest pain, dizziness  On review of his prior admissions and current admission, he always presented with epigastric pain, nausea and vomit which is not typically related to sickling; He does admit to chroinic Marijuana use since being a young kid primarily to obtain relief from sickle pain and to avoid using too much oxycodone       MEDICATIONS  (STANDING):  colchicine 0.6 milliGRAM(s) Oral daily  folic acid 1 milliGRAM(s) Oral <User Schedule>  heparin   Injectable 5000 Unit(s) SubCutaneous every 8 hours  influenza   Vaccine 0.5 milliLiter(s) IntraMuscular once  lidocaine 2% Viscous 5 milliLiter(s) Oral every 8 hours  pantoprazole    Tablet 40 milliGRAM(s) Oral two times a day  senna 2 Tablet(s) Oral at bedtime  sodium chloride 0.9%. 1000 milliLiter(s) (125 mL/Hr) IV Continuous <Continuous>  sucralfate 1 Gram(s) Oral four times a day    MEDICATIONS  (PRN):  acetaminophen     Tablet .. 650 milliGRAM(s) Oral every 6 hours PRN Temp greater or equal to 38C (100.4F), Mild Pain (1 - 3)  HYDROmorphone  Injectable 2 milliGRAM(s) IV Push every 3 hours PRN Severe Pain (7 - 10)    Vital Signs Last 24 Hrs  T(C): 36.8 (13 Mar 2022 12:38), Max: 37.2 (12 Mar 2022 20:00)  T(F): 98.3 (13 Mar 2022 12:38), Max: 98.9 (12 Mar 2022 20:00)  HR: 72 (13 Mar 2022 12:38) (72 - 88)  BP: 132/78 (13 Mar 2022 12:38) (132/78 - 143/77)  RR: 18 (13 Mar 2022 12:38) (16 - 18)  SpO2: 96% (13 Mar 2022 12:38) (92% - 96%)    P/E:  Middle aged male comfortable in bed conversing on phone NAD  appears icteric generalized and scleral icterus  Psych: AAO x3  Neuro: No gross focal deficits; Power and sensation intact  CVS: S1S2 present, regular, no edema  Resp: BLAE+, No wheeze or Rhonchi  GI: Soft, BS+, Mildly tender epigastric area, non distended  Extr: No  calf tenderness B/L Lower extremities  Skin: Warm and moist without any rashes      Labs:                        5.6    9.68  )-----------( 278      ( 13 Mar 2022 07:02 )             15.4   03-13    142  |  112<H>  |  8   ----------------------------<  91  3.8   |  23  |  0.67    Ca    8.0<L>      13 Mar 2022 07:02  Mg     1.9     03-12    TPro  6.3  /  Alb  2.9<L>  /  TBili  7.9<H>  /  DBili  4.2<H>  /  AST  129<H>  /  ALT  87<H>  /  AlkPhos  104  03-12        CT Abdomen and Pelvis w/ IV Cont (03.10.22 @ 20:17)   FINDINGS:  LOWER CHEST: Cardiomegaly. Bibasilar linear type atelectasis.    LIVER: Prominent hepatorenal notching and hypertrophy lateral segment of the left hepatic lobe. Findings can be seen with cirrhosis. Periportal edema.  BILE DUCTS: Normal caliber.  GALLBLADDER: Cholecystectomy.  SPLEEN: Jason infarcted calcified spleen.  PANCREAS: Within normal limits.  ADRENALS: Within normal limits.  KIDNEYS/URETERS: Left renal cyst. Symmetric enhancement. Partial left renal duplication. No hydronephrosis.    BLADDER: Within normal limits.  REPRODUCTIVE ORGANS: Prostate within normal limits.    BOWEL: No bowel obstruction. Appendix is normal.  PERITONEUM: No ascites.  VESSELS: Within normal limits.  RETROPERITONEUM/LYMPH NODES: Stable enlarged upper abdominal lymph nodes in the gastrohepatic, periportal, and portacaval regions, nonspecific.  ABDOMINAL WALL: Small fat-containing left inguinal hernia.  BONES: Stable moderate T12 and mild to moderate L1 compression deformities..    IMPRESSION: No acute abdominal pathology.  Hepatic morphology is nonspecific but can be seen with underlying cirrhosis.    Enlarged upper abdominal lymph nodes are nonspecific and can be seen in the setting of portal hypertension.     Patient seen and examined this morning    40 y/o male with PMH of sickle cell disease with multiple recent admission s(2/24-2/26, 2/28-3/7) presents with diffuse body pain more prominent at back pain and epigastric abd pain, nausea/vomiting. Body and back pain consistent with prior sickle cell pain crisis. Pain radiates from epigastrium to mid back. Reports multiple episodes of diarrhea over past 1-2 days which resolved prior to previousdischarges. Inhaled THC use daily since aged 12. Denies dysuria, hematuria, suprapubic pain/ frequency. Baseline Hgb typically in the 6 range    Patient is doing well; comfortable in bed, not in acute distress, tolerating diet, appears jaundiced, abdominal pain improved yesterday but complained of nausea and vomit feeling today with some pain epigastric area.   denies fever, chills, SOB, palpitations, chest pain, dizziness  On review of his prior admissions and current admission, he always presented with epigastric pain, nausea and vomit which is not typically related to sickling; He does admit to chroinic Marijuana use since being a young kid primarily to obtain relief from sickle pain and to avoid using too much oxycodone     MEDICATIONS  (STANDING):  colchicine 0.6 milliGRAM(s) Oral daily  folic acid 1 milliGRAM(s) Oral <User Schedule>  heparin   Injectable 5000 Unit(s) SubCutaneous every 8 hours  influenza   Vaccine 0.5 milliLiter(s) IntraMuscular once  LORazepam     Tablet 1 milliGRAM(s) Oral every 8 hours  pantoprazole    Tablet 40 milliGRAM(s) Oral two times a day  senna 2 Tablet(s) Oral at bedtime  sodium chloride 0.9%. 1000 milliLiter(s) (125 mL/Hr) IV Continuous <Continuous>  sucralfate 1 Gram(s) Oral four times a day    MEDICATIONS  (PRN):  acetaminophen     Tablet .. 650 milliGRAM(s) Oral every 6 hours PRN Temp greater or equal to 38C (100.4F), Mild Pain (1 - 3)  HYDROmorphone  Injectable 2 milliGRAM(s) IV Push every 3 hours PRN Severe Pain (7 - 10)    Vital Signs Last 24 Hrs  T(C): 36.8 (13 Mar 2022 12:38), Max: 37.2 (12 Mar 2022 20:00)  T(F): 98.3 (13 Mar 2022 12:38), Max: 98.9 (12 Mar 2022 20:00)  HR: 72 (13 Mar 2022 12:38) (72 - 88)  BP: 132/78 (13 Mar 2022 12:38) (132/78 - 143/77)  RR: 18 (13 Mar 2022 12:38) (16 - 18)  SpO2: 96% (13 Mar 2022 12:38) (92% - 96%)    P/E:  Middle aged male comfortable in bed conversing on phone NAD  appears icteric generalized and scleral icterus  Psych: AAO x3  Neuro: No gross focal deficits; Power and sensation intact  CVS: S1S2 present, regular, no edema  Resp: BLAE+, No wheeze or Rhonchi  GI: Soft, BS+, Mildly tender epigastric area, non distended  Extr: No  calf tenderness B/L Lower extremities  Skin: Warm and moist without any rashes    Labs:                        5.6    9.68  )-----------( 278      ( 13 Mar 2022 07:02 )             15.4   03-13    142  |  112<H>  |  8   ----------------------------<  91  3.8   |  23  |  0.67    Ca    8.0<L>      13 Mar 2022 07:02  Mg     1.9     03-12    TPro  6.3  /  Alb  2.9<L>  /  TBili  7.9<H>  /  DBili  4.2<H>  /  AST  129<H>  /  ALT  87<H>  /  AlkPhos  104  03-12        CT Abdomen and Pelvis w/ IV Cont (03.10.22 @ 20:17)   FINDINGS:  LOWER CHEST: Cardiomegaly. Bibasilar linear type atelectasis.    LIVER: Prominent hepatorenal notching and hypertrophy lateral segment of the left hepatic lobe. Findings can be seen with cirrhosis. Periportal edema.  BILE DUCTS: Normal caliber.  GALLBLADDER: Cholecystectomy.  SPLEEN: Jason infarcted calcified spleen.  PANCREAS: Within normal limits.  ADRENALS: Within normal limits.  KIDNEYS/URETERS: Left renal cyst. Symmetric enhancement. Partial left renal duplication. No hydronephrosis.    BLADDER: Within normal limits.  REPRODUCTIVE ORGANS: Prostate within normal limits.    BOWEL: No bowel obstruction. Appendix is normal.  PERITONEUM: No ascites.  VESSELS: Within normal limits.  RETROPERITONEUM/LYMPH NODES: Stable enlarged upper abdominal lymph nodes in the gastrohepatic, periportal, and portacaval regions, nonspecific.  ABDOMINAL WALL: Small fat-containing left inguinal hernia.  BONES: Stable moderate T12 and mild to moderate L1 compression deformities..    IMPRESSION: No acute abdominal pathology.  Hepatic morphology is nonspecific but can be seen with underlying cirrhosis.    Enlarged upper abdominal lymph nodes are nonspecific and can be seen in the setting of portal hypertension.

## 2022-03-13 NOTE — CHART NOTE - NSCHARTNOTEFT_GEN_A_CORE
Late entry from this morning at 0900h  EVENT:Notified by RN of pt's nausea.     SUBJECTIVE:Pt seen and examined . Pt awake, endorses diffuse  abdominal pain a little better 6/10 , nausea relieved after Zofran this morning , able to keep breakfast down. Pt denies SOB, chest discomfort, vomiting, diarrhea.     OBJECTIVE:  Vital Signs Last 24 Hrs  T(C): 36.8 (13 Mar 2022 12:38), Max: 37.2 (12 Mar 2022 20:00)  T(F): 98.3 (13 Mar 2022 12:38), Max: 98.9 (12 Mar 2022 20:00)  HR: 72 (13 Mar 2022 12:38) (72 - 88)  BP: 132/78 (13 Mar 2022 12:38) (132/78 - 143/77)  BP(mean): --  RR: 18 (13 Mar 2022 12:38) (16 - 18)  SpO2: 96% (13 Mar 2022 12:38) (92% - 96%)    MEDICATIONS  (STANDING):  colchicine 0.6 milliGRAM(s) Oral daily  folic acid 1 milliGRAM(s) Oral <User Schedule>  heparin   Injectable 5000 Unit(s) SubCutaneous every 8 hours  influenza   Vaccine 0.5 milliLiter(s) IntraMuscular once  pantoprazole    Tablet 40 milliGRAM(s) Oral two times a day  senna 2 Tablet(s) Oral at bedtime  sodium chloride 0.9%. 1000 milliLiter(s) (125 mL/Hr) IV Continuous <Continuous>  sucralfate 1 Gram(s) Oral four times a day    MEDICATIONS  (PRN):  acetaminophen     Tablet .. 650 milliGRAM(s) Oral every 6 hours PRN Temp greater or equal to 38C (100.4F), Mild Pain (1 - 3)  HYDROmorphone  Injectable 2 milliGRAM(s) IV Push every 3 hours PRN Severe Pain (7 - 10)  -------------  Physical Exam:  .pexam    LABS:                        5.6    9.68  )-----------( 278      ( 13 Mar 2022 07:02 )             15.4     03-13    142  |  112<H>  |  8   ----------------------------<  91  3.8   |  23  |  0.67    Ca    8.0<L>      13 Mar 2022 07:02  Mg     1.9     03-12    TPro  6.3  /  Alb  2.9<L>  /  TBili  7.9<H>  /  DBili  4.2<H>  /  AST  129<H>  /  ALT  87<H>  /  AlkPhos  104  03-12      EKG:   IMGAGING:    ASSESSMENT:  HPI:  Patient is a 40 y/o with gout and SCD recently hospitalized 2/24-2/26, 2/28-3/7 for pain crisis presenting with 5 hours of new pain, all over, primarily in abdomen and bilious vomiting. Patient reports he was feeling okay since discharge a few days ago until today at noon when he began having pain and took oxy. The pain progressed and he began vomiting and having diarrhea. Pt reports that he feels cold. Pt reports pain all over his body. (11 Mar 2022 01:54)      PLAN: Late entry from this morning at 0900h  EVENT:Notified by RN of pt's nausea.     SUBJECTIVE:Pt seen and examined . Pt awake, endorses diffuse  abdominal pain a little better 6/10 , nausea relieved after Zofran this morning , able to keep breakfast down, had soft  BM this morning. Pt denies SOB, chest discomfort, vomiting, diarrhea.     OBJECTIVE:  Vital Signs Last 24 Hrs  T(C): 36.8 (13 Mar 2022 12:38), Max: 37.2 (12 Mar 2022 20:00)  T(F): 98.3 (13 Mar 2022 12:38), Max: 98.9 (12 Mar 2022 20:00)  HR: 72 (13 Mar 2022 12:38) (72 - 88)  BP: 132/78 (13 Mar 2022 12:38) (132/78 - 143/77)  BP(mean): --  RR: 18 (13 Mar 2022 12:38) (16 - 18)  SpO2: 96% (13 Mar 2022 12:38) (92% - 96%)    MEDICATIONS  (STANDING):  colchicine 0.6 milliGRAM(s) Oral daily  folic acid 1 milliGRAM(s) Oral <User Schedule>  heparin   Injectable 5000 Unit(s) SubCutaneous every 8 hours  influenza   Vaccine 0.5 milliLiter(s) IntraMuscular once  pantoprazole    Tablet 40 milliGRAM(s) Oral two times a day  senna 2 Tablet(s) Oral at bedtime  sodium chloride 0.9%. 1000 milliLiter(s) (125 mL/Hr) IV Continuous <Continuous>  sucralfate 1 Gram(s) Oral four times a day    MEDICATIONS  (PRN):  acetaminophen     Tablet .. 650 milliGRAM(s) Oral every 6 hours PRN Temp greater or equal to 38C (100.4F), Mild Pain (1 - 3)  HYDROmorphone  Injectable 2 milliGRAM(s) IV Push every 3 hours PRN Severe Pain (7 - 10)  -------------  Physical Exam:  Gen: NAD  HEENT: PERRL  Resp: Clear breath sounds on auscultation. No rales, no wheezing  CVS: S1S2 present, regular  GI: BS(+), Soft, ND, NT  Extremities : BLE No edema or calf tenderness. PPP  Neuro: Awake/alert.  no new neuro deficits  Skin: jaundice, warm,dry,no rash        LABS:                        5.6    9.68  )-----------( 278      ( 13 Mar 2022 07:02 )             15.4     03-13    142  |  112<H>  |  8   ----------------------------<  91  3.8   |  23  |  0.67    Ca    8.0<L>      13 Mar 2022 07:02  Mg     1.9     03-12    TPro  6.3  /  Alb  2.9<L>  /  TBili  7.9<H>  /  DBili  4.2<H>  /  AST  129<H>  /  ALT  87<H>  /  AlkPhos  104  03-12      EKG:  Normal sinus rhythm  Normal ECG    < end of copied text >    < from: 12 Lead ECG (02.24.22 @ 21:52) >    QTC Calculation(Bazett) 452 ms    < end of copied text >          ASSESSMENT:  HPI:  Patient is a 40 y/o with gout and SCD recently hospitalized 2/24-2/26, 2/28-3/7 for pain crisis presenting with 5 hours of new pain, all over, primarily in abdomen and bilious vomiting.  Pt is admitted with Sickle cell crisis/abdominal pain. Epigastric pain with nausea and vomit on repeated admission could be Cannabis Hyperemesis verus possible PUD/duodenal, gastritis, esophagitis .      PLAN:  1. Nausea likely due to Cannabis hyperemesis syndrome.   -Ativan 1 mg po q8hrs x 3 doses.   -Hot showers prn  -Continue hydration  -Continue PPI  -pain control    D/w attending, and pt.     Anjali Fernández NP Medicine  [phone]

## 2022-03-13 NOTE — PROGRESS NOTE ADULT - ASSESSMENT
1. Sickle Cell with painful acute vasoocclusive crisis  Continue vigorousIVF hydration  pain control, pain management f/u d/w NP Lora; pain seems controlled; will reduce to 2mg q 3 hrs Dilaudid currently well tolerated  serial CBC, follow up with Dr. Jordan    2. 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   Cannabis could also lead to gastritis and Esophagitis seen on CT; GI would want to hold off EGD for now    3. Hyperbilirubinemia Indirect and Direct  Ideally should be indirect form sickling; d/w Dr. Aldrich agrees; needs further imaging of Biliary system; Will get MRCP with contrast     4. suspected Cirrhosis, portal hypertension, abnormal LFTs  previously referred to hepatology outpt however was unable to make to appts due to repeated hospitalization.   GI f/u; consult appreciated; Follow up Hepatology Dr. Knutson     5. Systemic Inflammatory Response on admission  resolved    4. diarrhea, possible component of enteritis resolved   Defer further abx for now--> f/u blood/urine cultures all negative;   send stool cx/ PCR/ C diff: Cdiff negative; off Conact isolation    7. Anemia due to active sickling  Baseline Hgb around 5.5 to 6; Hold off transfusions unless drops further or symptomatic    Discussed with Patient findings and plan of care at [facility] and also advised to read about Cannabis Hyperemesis on his phone  Discussed with RENNY Alba and YAHAIRA Castorena 41 Male with epigastric pain, nausea and vomit in the background of sickle brenda crisis and elevated direct and indirect hyperbilirubinemia    1. Sickle Cell with painful acute vasoocclusive crisis  Continue vigorous IVF hydration  pain control, pain management f/u d/w STACY Paulino; pain seems controlled; continue Dilaudid 2mg q 3 hrs; currently well tolerated  serial CBC, follow up with Dr. Jordan    2. 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   Cannabis could also lead to gastritis and Esophagitis seen on CT; GI would want to hold off EGD for now as d/w Dr. Aldrich    3. Hyperbilirubinemia Indirect and Direct  Ideally should be indirect form sickling; d/w Dr. Aldrich agrees; needs further imaging of Biliary system; Will get MRCP with contrast as per GI     4. Suspected Cirrhosis, portal hypertension, abnormal LFTs  previously referred to hepatology outpt however was unable to make to appts due to repeated hospitalization.   GI f/u; consult appreciated; Follow up Hepatology Dr. Knutson     5. Systemic Inflammatory Response on admission  resolved    4. Diarrhea, possible component of enteritis resolved   Defer further abx for now--> f/u blood/urine cultures all negative;   send stool cx/ PCR/ C diff: Cdiff negative; off Contact isolation    7. Anemia due to active sickling  Baseline Hgb around 5.5 to 6; Hold off transfusions unless drops further or symptomatic    Discussed with Patient findings and plan of care at [facility] and also advised to read about Cannabis Hyperemesis on his phone  Discussed with RENNY Alba and YAHAIRA Castorena

## 2022-03-14 DIAGNOSIS — M54.9 DORSALGIA, UNSPECIFIED: ICD-10-CM

## 2022-03-14 DIAGNOSIS — E80.6 OTHER DISORDERS OF BILIRUBIN METABOLISM: ICD-10-CM

## 2022-03-14 DIAGNOSIS — F11.20 OPIOID DEPENDENCE, UNCOMPLICATED: ICD-10-CM

## 2022-03-14 DIAGNOSIS — R11.2 NAUSEA WITH VOMITING, UNSPECIFIED: ICD-10-CM

## 2022-03-14 DIAGNOSIS — K29.70 GASTRITIS, UNSPECIFIED, WITHOUT BLEEDING: ICD-10-CM

## 2022-03-14 DIAGNOSIS — K74.60 UNSPECIFIED CIRRHOSIS OF LIVER: ICD-10-CM

## 2022-03-14 DIAGNOSIS — F12.90 CANNABIS USE, UNSPECIFIED, UNCOMPLICATED: ICD-10-CM

## 2022-03-14 LAB
ALBUMIN SERPL ELPH-MCNC: 3 G/DL — LOW (ref 3.5–5)
ALP SERPL-CCNC: 104 U/L — SIGNIFICANT CHANGE UP (ref 40–120)
ALT FLD-CCNC: 85 U/L DA — HIGH (ref 10–60)
ANION GAP SERPL CALC-SCNC: 5 MMOL/L — SIGNIFICANT CHANGE UP (ref 5–17)
AST SERPL-CCNC: 112 U/L — HIGH (ref 10–40)
BILIRUB DIRECT SERPL-MCNC: 3.7 MG/DL — HIGH (ref 0–0.3)
BILIRUB INDIRECT FLD-MCNC: 3.6 MG/DL — HIGH (ref 0.2–1)
BILIRUB SERPL-MCNC: 7.3 MG/DL — HIGH (ref 0.2–1.2)
BUN SERPL-MCNC: 8 MG/DL — SIGNIFICANT CHANGE UP (ref 7–18)
CALCIUM SERPL-MCNC: 8.3 MG/DL — LOW (ref 8.4–10.5)
CHLORIDE SERPL-SCNC: 112 MMOL/L — HIGH (ref 96–108)
CO2 SERPL-SCNC: 24 MMOL/L — SIGNIFICANT CHANGE UP (ref 22–31)
CREAT SERPL-MCNC: 0.7 MG/DL — SIGNIFICANT CHANGE UP (ref 0.5–1.3)
CULTURE RESULTS: SIGNIFICANT CHANGE UP
EGFR: 118 ML/MIN/1.73M2 — SIGNIFICANT CHANGE UP
GLUCOSE SERPL-MCNC: 91 MG/DL — SIGNIFICANT CHANGE UP (ref 70–99)
HCT VFR BLD CALC: 15 % — CRITICAL LOW (ref 39–50)
HGB BLD-MCNC: 5.3 G/DL — CRITICAL LOW (ref 13–17)
MCHC RBC-ENTMCNC: 32.1 PG — SIGNIFICANT CHANGE UP (ref 27–34)
MCHC RBC-ENTMCNC: 35.3 GM/DL — SIGNIFICANT CHANGE UP (ref 32–36)
MCV RBC AUTO: 90.9 FL — SIGNIFICANT CHANGE UP (ref 80–100)
NRBC # BLD: 0 /100 WBCS — SIGNIFICANT CHANGE UP (ref 0–0)
PLATELET # BLD AUTO: 289 K/UL — SIGNIFICANT CHANGE UP (ref 150–400)
POTASSIUM SERPL-MCNC: 3.7 MMOL/L — SIGNIFICANT CHANGE UP (ref 3.5–5.3)
POTASSIUM SERPL-SCNC: 3.7 MMOL/L — SIGNIFICANT CHANGE UP (ref 3.5–5.3)
PROT SERPL-MCNC: 6.4 G/DL — SIGNIFICANT CHANGE UP (ref 6–8.3)
RBC # BLD: 1.65 M/UL — LOW (ref 4.2–5.8)
RBC # BLD: 1.65 M/UL — LOW (ref 4.2–5.8)
RBC # FLD: 20 % — HIGH (ref 10.3–14.5)
RETICS #: 261.9 K/UL — HIGH (ref 25–125)
RETICS/RBC NFR: 15.9 % — HIGH (ref 0.5–2.5)
SODIUM SERPL-SCNC: 141 MMOL/L — SIGNIFICANT CHANGE UP (ref 135–145)
SPECIMEN SOURCE: SIGNIFICANT CHANGE UP
WBC # BLD: 12.11 K/UL — HIGH (ref 3.8–10.5)
WBC # FLD AUTO: 12.11 K/UL — HIGH (ref 3.8–10.5)

## 2022-03-14 PROCEDURE — 99232 SBSQ HOSP IP/OBS MODERATE 35: CPT

## 2022-03-14 PROCEDURE — 99233 SBSQ HOSP IP/OBS HIGH 50: CPT | Mod: GC

## 2022-03-14 PROCEDURE — 74183 MRI ABD W/O CNTR FLWD CNTR: CPT | Mod: 26

## 2022-03-14 RX ADMIN — HYDROMORPHONE HYDROCHLORIDE 2 MILLIGRAM(S): 2 INJECTION INTRAMUSCULAR; INTRAVENOUS; SUBCUTANEOUS at 05:31

## 2022-03-14 RX ADMIN — SENNA PLUS 2 TABLET(S): 8.6 TABLET ORAL at 21:56

## 2022-03-14 RX ADMIN — HYDROMORPHONE HYDROCHLORIDE 2 MILLIGRAM(S): 2 INJECTION INTRAMUSCULAR; INTRAVENOUS; SUBCUTANEOUS at 22:50

## 2022-03-14 RX ADMIN — HEPARIN SODIUM 5000 UNIT(S): 5000 INJECTION INTRAVENOUS; SUBCUTANEOUS at 21:56

## 2022-03-14 RX ADMIN — HYDROMORPHONE HYDROCHLORIDE 2 MILLIGRAM(S): 2 INJECTION INTRAMUSCULAR; INTRAVENOUS; SUBCUTANEOUS at 13:00

## 2022-03-14 RX ADMIN — HYDROMORPHONE HYDROCHLORIDE 2 MILLIGRAM(S): 2 INJECTION INTRAMUSCULAR; INTRAVENOUS; SUBCUTANEOUS at 08:39

## 2022-03-14 RX ADMIN — PANTOPRAZOLE SODIUM 40 MILLIGRAM(S): 20 TABLET, DELAYED RELEASE ORAL at 05:33

## 2022-03-14 RX ADMIN — Medication 1 MILLIGRAM(S): at 05:33

## 2022-03-14 RX ADMIN — Medication 1 MILLIGRAM(S): at 17:23

## 2022-03-14 RX ADMIN — HEPARIN SODIUM 5000 UNIT(S): 5000 INJECTION INTRAVENOUS; SUBCUTANEOUS at 05:34

## 2022-03-14 RX ADMIN — HYDROMORPHONE HYDROCHLORIDE 2 MILLIGRAM(S): 2 INJECTION INTRAMUSCULAR; INTRAVENOUS; SUBCUTANEOUS at 02:33

## 2022-03-14 RX ADMIN — HYDROMORPHONE HYDROCHLORIDE 2 MILLIGRAM(S): 2 INJECTION INTRAMUSCULAR; INTRAVENOUS; SUBCUTANEOUS at 05:50

## 2022-03-14 RX ADMIN — SODIUM CHLORIDE 125 MILLILITER(S): 9 INJECTION INTRAMUSCULAR; INTRAVENOUS; SUBCUTANEOUS at 12:27

## 2022-03-14 RX ADMIN — HYDROMORPHONE HYDROCHLORIDE 2 MILLIGRAM(S): 2 INJECTION INTRAMUSCULAR; INTRAVENOUS; SUBCUTANEOUS at 02:50

## 2022-03-14 RX ADMIN — HYDROMORPHONE HYDROCHLORIDE 2 MILLIGRAM(S): 2 INJECTION INTRAMUSCULAR; INTRAVENOUS; SUBCUTANEOUS at 23:58

## 2022-03-14 RX ADMIN — HYDROMORPHONE HYDROCHLORIDE 2 MILLIGRAM(S): 2 INJECTION INTRAMUSCULAR; INTRAVENOUS; SUBCUTANEOUS at 19:03

## 2022-03-14 RX ADMIN — HYDROMORPHONE HYDROCHLORIDE 2 MILLIGRAM(S): 2 INJECTION INTRAMUSCULAR; INTRAVENOUS; SUBCUTANEOUS at 09:00

## 2022-03-14 RX ADMIN — HEPARIN SODIUM 5000 UNIT(S): 5000 INJECTION INTRAVENOUS; SUBCUTANEOUS at 13:15

## 2022-03-14 RX ADMIN — Medication 1 GRAM(S): at 05:33

## 2022-03-14 RX ADMIN — Medication 0.6 MILLIGRAM(S): at 12:10

## 2022-03-14 RX ADMIN — Medication 1 MILLIGRAM(S): at 08:26

## 2022-03-14 RX ADMIN — SODIUM CHLORIDE 125 MILLILITER(S): 9 INJECTION INTRAMUSCULAR; INTRAVENOUS; SUBCUTANEOUS at 21:59

## 2022-03-14 RX ADMIN — HYDROMORPHONE HYDROCHLORIDE 2 MILLIGRAM(S): 2 INJECTION INTRAMUSCULAR; INTRAVENOUS; SUBCUTANEOUS at 15:36

## 2022-03-14 RX ADMIN — HYDROMORPHONE HYDROCHLORIDE 2 MILLIGRAM(S): 2 INJECTION INTRAMUSCULAR; INTRAVENOUS; SUBCUTANEOUS at 12:11

## 2022-03-14 RX ADMIN — PANTOPRAZOLE SODIUM 40 MILLIGRAM(S): 20 TABLET, DELAYED RELEASE ORAL at 17:23

## 2022-03-14 RX ADMIN — HYDROMORPHONE HYDROCHLORIDE 2 MILLIGRAM(S): 2 INJECTION INTRAMUSCULAR; INTRAVENOUS; SUBCUTANEOUS at 15:16

## 2022-03-14 NOTE — PROGRESS NOTE ADULT - PROBLEM SELECTOR PLAN 2
- Noticed elevated LFT. This has been worked up since last admission. Possibly DILI Vs sickle cell hepatic crisis. He did not get the chance to make an appointment with Dr. Tristan.   - Recommend f/u with Dr. Tristan  - Trend LFT - Noticed elevated LFT. This has been worked up since last admission. Possibly DILI Vs sickle cell hepatic crisis. He did not get the chance to make an appointment with Dr. Tristan.   - Recommend f/u with Dr. Tristan  - Trend LFT  - f/u MRI abd

## 2022-03-14 NOTE — PROGRESS NOTE ADULT - SUBJECTIVE AND OBJECTIVE BOX
Source of information: TOLU VARGAS, Chart review  Patient language: English  : n/a    HPI:  Patient is a 42 y/o with gout and SCD recently hospitalized 2/24-2/26, 2/28-3/7 for pain crisis presenting with 5 hours of new pain, all over, primarily in abdomen and bilious vomiting. Patient reports he was feeling okay since discharge a few days ago until today at noon when he began having pain and took oxy. The pain progressed and he began vomiting and having diarrhea. Pt reports that he feels cold. Pt reports pain all over his body. (11 Mar 2022 01:54)    Pt diagnosed with sickle cell crisis. Retic percent 15.9. Bilirubin 7.9. CTAP demonstrates Hepatic morphology is nonspecific but can be seen with underlying   cirrhosis. Enlarged upper abdominal lymph nodes are nonspecific and can be seen in the setting of portal hypertension.  Pending MRI abdomen. Pain consulted for sickle cell pain. Pt seen and examined at bedside. Pt reports generalized pain, greatest over back, left hip and epigastric region. Currently rating PAIN SCORE:  8/10 and tolerable SCALE USED: (1-10 VNRS). Pain adequately managed on current pain regimen. Pt describes pain as aching, intermittently sharp, non- radiating, alleviated by pain medications, exacerbated by movement. Pt tolerating PO diet. Pt denies chest pain, SOB, nausea, vomiting, constipation and itchiness. Reports lethargy. Last BM 3/13. Patient stated goal for pain control: to be able to take deep breaths, get out of bed to chair and ambulate with tolerable pain control. Pt reports ambulating throughout the day. Pt on chronic opioids at home oxycodone 30mg q4-6h PRN.    PAST MEDICAL & SURGICAL HISTORY:  Sickle cell anemia    Gout    History of cholecystectomy        FAMILY HISTORY:  Family history of sickle cell trait (Father, Mother)        Social History:  Patient is from home, non smoker or alcoholic hx (11 Mar 2022 01:54)   [X ]Denies ETOH use and smoking   [X] + marijuana smoking     Allergies    ceftriaxone (Anaphylaxis)  hydroxyurea (Other)  penicillin (Pruritus)  piperacillin-tazobactam (Urticaria)    MEDICATIONS  (STANDING):  colchicine 0.6 milliGRAM(s) Oral daily  folic acid 1 milliGRAM(s) Oral <User Schedule>  heparin   Injectable 5000 Unit(s) SubCutaneous every 8 hours  influenza   Vaccine 0.5 milliLiter(s) IntraMuscular once  pantoprazole    Tablet 40 milliGRAM(s) Oral two times a day  senna 2 Tablet(s) Oral at bedtime  sodium chloride 0.9%. 1000 milliLiter(s) (125 mL/Hr) IV Continuous <Continuous>    MEDICATIONS  (PRN):  acetaminophen     Tablet .. 650 milliGRAM(s) Oral every 6 hours PRN Temp greater or equal to 38C (100.4F), Mild Pain (1 - 3)  HYDROmorphone  Injectable 2 milliGRAM(s) IV Push every 3 hours PRN Severe Pain (7 - 10)      Vital Signs Last 24 Hrs  T(C): 36.8 (14 Mar 2022 04:33), Max: 36.9 (13 Mar 2022 19:34)  T(F): 98.3 (14 Mar 2022 04:33), Max: 98.5 (13 Mar 2022 19:34)  HR: 84 (14 Mar 2022 04:33) (72 - 89)  BP: 138/75 (14 Mar 2022 04:33) (132/78 - 148/81)  BP(mean): --  RR: 18 (14 Mar 2022 04:33) (18 - 18)  SpO2: 94% (14 Mar 2022 04:33) (93% - 96%)  COVID-19 PCR: NotDetec (10 Mar 2022 19:07)  COVID-19 PCR: NotDetec (07 Mar 2022 07:08)  COVID-19 PCR: NotDetec (28 Feb 2022 11:17)  COVID-19 PCR: NotDetec (25 Feb 2022 00:43)  SARS-CoV-2: NotDetec (28 Oct 2021 05:25)  COVID-19 PCR: NotDetec (28 Oct 2021 03:35)  COVID-19 PCR: NotDetec (07 Oct 2021 18:02)    LABS: Reviewed                          5.3    12.11 )-----------( 289      ( 14 Mar 2022 07:31 )             15.0     03-14    141  |  112<H>  |  8   ----------------------------<  91  3.7   |  24  |  0.70    Ca    8.3<L>      14 Mar 2022 07:31    TPro  6.3  /  Alb  2.9<L>  /  TBili  7.9<H>  /  DBili  4.2<H>  /  AST  129<H>  /  ALT  87<H>  /  AlkPhos  104  03-12      LIVER FUNCTIONS - ( 12 Mar 2022 13:26 )  Alb: 2.9 g/dL / Pro: 6.3 g/dL / ALK PHOS: 104 U/L / ALT: 87 U/L DA / AST: 129 U/L / GGT: x             CAPILLARY BLOOD GLUCOSE        COVID-19 PCR: NotDetec (10 Mar 2022 19:07)  COVID-19 PCR: NotDetec (07 Mar 2022 07:08)  COVID-19 PCR: NotDetec (28 Feb 2022 11:17)  COVID-19 PCR: NotDetec (25 Feb 2022 00:43)  SARS-CoV-2: NotDetec (28 Oct 2021 05:25)  COVID-19 PCR: NotDetec (28 Oct 2021 03:35)  COVID-19 PCR: NotDetec (07 Oct 2021 18:02)      Radiology: Reviewed  < from: Xray Chest 1 View-PORTABLE IMMEDIATE (Xray Chest 1 View-PORTABLE IMMEDIATE .) (03.10.22 @ 22:08) >    ACC: [id number] EXAM:  XR CHEST PORTABLE IMMED 1V                          PROCEDURE DATE:  03/10/2022          INTERPRETATION:  Portable chest radiograph    CLINICAL INFORMATION: Bilious vomiting.    TECHNIQUE:  Portable  AP chest radiograph.    COMPARISON: 10/28/2021 .    FINDINGS:  CATHETERS AND TUBES: Mediport catheter tip in SVC.    PULMONARY: The visualized lungs are clear of airspace consolidations or   effusions.   No pneumothorax.    HEART/VASCULAR: The  heart is enlarged in transverse diameter. .    BONES: Visualized osseous structures are intact.    IMPRESSION:   No radiographic evidence of active chest disease.    --- End of Report ---            RAMO MORALES MD; Attending Radiologist  This document has been electronically signed. Mar 11 2022  1:24PM    < end of copied text >    < from: CT Abdomen and Pelvis w/ IV Cont (03.10.22 @ 20:17) >    ACC: [id number] EXAM:  CT ABDOMEN AND PELVIS IC                          PROCEDURE DATE:  03/10/2022          INTERPRETATION:  CLINICAL INFORMATION: Right upper quadrant pain and   vomiting    COMPARISON: CT abdomen pelvis 3/4/2022    CONTRAST/COMPLICATIONS:  IV Contrast: Omnipaque 350  90 cc administered   10 cc discarded  Oral Contrast: NONE  Complications: None reported at time of study completion    PROCEDURE:  CT of the Abdomen and Pelvis was performed.  Sagittal and coronal reformats were performed.    FINDINGS:  LOWER CHEST: Cardiomegaly. Bibasilar linear type atelectasis.    LIVER: Prominent hepatorenal notching and hypertrophy lateral segment of   the left hepatic lobe. Findings can be seen with cirrhosis. Periportal   edema.  BILE DUCTS: Normal caliber.  GALLBLADDER: Cholecystectomy.  SPLEEN: Jason infarcted calcified spleen.  PANCREAS: Within normal limits.  ADRENALS: Within normal limits.  KIDNEYS/URETERS: Left renal cyst. Symmetric enhancement. Partial left   renal duplication. No hydronephrosis.    BLADDER: Within normal limits.  REPRODUCTIVE ORGANS: Prostate within normal limits.    BOWEL: No bowel obstruction. Appendix is normal.  PERITONEUM: No ascites.  VESSELS: Within normal limits.  RETROPERITONEUM/LYMPH NODES: Stable enlarged upper abdominal lymph nodes   in the gastrohepatic, periportal, and portacaval regions, nonspecific.  ABDOMINAL WALL: Small fat-containing left inguinal hernia.  BONES: Stable moderate T12 and mild to moderate L1 compression   deformities..    IMPRESSION:  No acute abdominal pathology.    Hepatic morphology is nonspecific but can be seen with underlying   cirrhosis.    Enlarged upper abdominal lymph nodes are nonspecific and can be seen in   the setting of portal hypertension.        --- End of Report ---            DIAZ URIOSTEGUI MD; Attending Radiologist  This document has been electronically signed. Mar 10 2022  8:29PM    < end of copied text >      ORT Score -   Family Hx of substance abuse	Female	      Male  Alcohol 	                                           1                     3  Illegal drugs	                                   2                     3  Rx drugs                                           4 	                  4  Personal Hx of substance abuse		  Alcohol 	                                          3	                  3  Illegal drugs                                     4	                  4  Rx drugs                                            5 	                  5  Age between 16- 45 years	           1                     1  hx preadolescent sexual abuse	   3 	                  0  Psychological disease		  ADD, OCD, bipolar, schizophrenia   2	          2  Depression                                           1 	          1  Total: 5    a score of 3 or lower indicates low risk for opioid abuse		  a score of 4-7 indicates moderate risk for opioid abuse		  a score of 8 or higher indicates high risk for opioid abuse    REVIEW OF SYSTEMS:  CONSTITUTIONAL: No fever + fatigue  RESPIRATORY: No cough, wheezing, chills or hemoptysis; No shortness of breath  CARDIOVASCULAR: No chest pain, palpitations, dizziness, or leg swelling  GASTROINTESTINAL: No loss of appetite, decreased PO intake. + epigastric pain. No nausea, vomiting; No diarrhea or constipation.   GENITOURINARY: No dysuria, frequency, hematuria, retention or incontinence  MUSCULOSKELETAL: + generalized body pain, + back pain, left hip pain, no swelling; no upper or lower motor strength weakness, no saddle anesthesia, bowel/bladder incontinence, no falls   NEURO: No headaches, No numbness/tingling b/l LE, No weakness    PHYSICAL EXAM:  GENERAL:  Faigued & Oriented X4, cooperative, NAD, Speech is clear.   RESPIRATORY: Respirations even and unlabored. Clear to auscultation bilaterally; No rales, rhonchi, wheezing, or rubs  CARDIOVASCULAR: + right chest wall mediport c/d/i; Normal S1/S2, regular rate and rhythm; No murmurs, rubs, or gallops. No JVD.   GASTROINTESTINAL:  Soft, Nontender, Nondistended; Bowel sounds present  PERIPHERAL VASCULAR:  Extremities warm without edema. 2+ Peripheral Pulses, No cyanosis, No calf tenderness  MUSCULOSKELETAL: Motor Strength 5/5 B/L upper and lower extremities; moves all extremities equally against gravity; ROM intact; negative SLR; No tenderness on palpation of all joints.   SKIN: + icterus + generalized jaundice; + multiple tattoos site c/d/i without erythema, edema. Warm, dry, intact. No rashes, lesions, scars or wounds.     Risk factors associated with adverse outcomes related to opioid treatment  [ ]  Concurrent benzodiazepine use  [X ]  History/ Active substance use or alcohol use disorder  [ ] Psychiatric co-morbidity  [ ] Sleep apnea  [ ] COPD  [ ] BMI> 35  [X ] Liver dysfunction  [ ] Renal dysfunction  [ ] CHF  [X ] Smoker- marijuana   [ ]  Age > 60 years    [X ]  [facility]  Reviewed and Copied to Chart. See below.    Plan of care and goal oriented pain management treatment options were discussed with patient and /or primary care giver; all questions and concerns were addressed and care was aligned with patient's wishes.    Educated patient on goal oriented pain management treatment options     03-14-22 @ 10:29

## 2022-03-14 NOTE — PROGRESS NOTE ADULT - PROBLEM SELECTOR PLAN 2
Pt with continuous nausea  Pt endorses cannabis use, which he uses to relieve the pain and decrease opioid use.

## 2022-03-14 NOTE — PROGRESS NOTE ADULT - PROBLEM SELECTOR PLAN 1
- CT A/P showed no acute abdominal pathology. Hepatic morphology is nonspecific but can be seen with underlying cirrhosis. Enlarged upper abdominal lymph nodes are nonspecific and can be seen in the setting of portal hypertension. Ddx includes gastritis, PUD.   - continue PPI to BID empirically   - Advance diet as tolerated  - noted to have some perigastric adenopathy on CT, warrants EGD as outpatient

## 2022-03-14 NOTE — PROGRESS NOTE ADULT - ASSESSMENT
Patient is a 42 y/o with gout and SCD recently hospitalized 2/24-2/26, 2/28-3/7 for pain crisis presenting with 5 hours of new pain, all over, primarily in abdomen and bilious vomiting. Pt without N/V; persistent abdominal pain.

## 2022-03-14 NOTE — PROGRESS NOTE ADULT - PROBLEM SELECTOR PLAN 1
Pt with generalized body pain, back, left hip and epigastric pain which is somatic  in nature due to sickle cell crisis and gastritis. Pt on chronic opioids at home oxycodone 30mg q4-6h PRN. Retic percent 15.9. Bilirubin 7.9. CTAP demonstrates Hepatic morphology is nonspecific but can be seen with underlying cirrhosis. Enlarged upper abdominal lymph nodes are nonspecific and can be seen in the setting of portal hypertension.  Pending MRI abdomen.   Opioid pain recommendations   - Continue Dilaudid 2mg IV q3h PRN severe pain. Monitor for sedation/ respiratory depression.   Non-opioid pain recommendations   - Pt with cirrhosis and transaminitis- caution use of acetaminophen  - Avoid nsaids- gastritis   Bowel Regimen  - Continue Miralax 17G PO daily  Mild pain   - Non-pharmacological pain treatment recommendations  - Warm/ Cool packs PRN   - Repositioning, imagery, relaxation, distraction.  - OOB if no contraindications   Recommendations discussed with primary team and RN Pt with generalized body pain, back, left hip and epigastric pain which is somatic  in nature due to sickle cell crisis and gastritis. Pt on chronic opioids at home oxycodone 30mg q4-6h PRN. Retic percent 15.9. Bilirubin 7.9. CTAP demonstrates Hepatic morphology is nonspecific but can be seen with underlying cirrhosis. Enlarged upper abdominal lymph nodes are nonspecific and can be seen in the setting of portal hypertension.  Pending MRI abdomen.   Opioid pain recommendations   - Continue Dilaudid 2mg IV q3h PRN severe pain. Monitor for sedation/ respiratory depression.   Non-opioid pain recommendations   - Pt with cirrhosis and transaminitis- caution use of acetaminophen  - Avoid nsaids- gastritis   Bowel Regimen  - Continue Miralax 17G PO daily  Mild pain   - Non-pharmacological pain treatment recommendations  - Warm/ Cool packs PRN   - Repositioning, imagery, relaxation, distraction.  - OOB if no contraindications   Continue IVF per primary team.   Recommendations discussed with primary team and RN

## 2022-03-14 NOTE — PROGRESS NOTE ADULT - ASSESSMENT
TOLU VARGAS is a 41y Male who presents with a chief complaint of sickle cell crisis.    Sickle Cell Disease  - Patient with baseline hemoglobin of 5-6.  - Transfuse as needed for symptomatic anemia. Maintain hemoglobin above 5.  - Hemoglobin today at 5.2. Monitor CBC.   - Continue pain management for sickle cell crisis.    Nausea/Vomiting  - Possibly secondary to cannabis use.   - Gastroenterology following. Continue to monitor symptoms.    Pending MRCP for hyperbilirubinemia. Continue to monitor CMP.    Will continue to follow.  On discharge, to follow-up with Dr. Shirley Jordan at [place] (Phone: [phone]).    Bala Pan MD  Hematology/Oncology  C: [phone]  O: [phone] TOLU VARGAS is a 41y Male who presents with a chief complaint of sickle cell crisis.    Sickle Cell Disease  - Patient with baseline hemoglobin of 5-6.  - Transfuse as needed for symptomatic anemia. Maintain hemoglobin above 5.  - Hemoglobin today at 5.2. Monitor CBC.   - Continue pain management for sickle cell crisis.    Nausea/Vomiting  - Possibly secondary to cannabis use.   - Gastroenterology following. Continue to monitor symptoms.    Pending MRCP for hyperbilirubinemia. Continue to monitor CMP.    Will continue to follow.  On discharge, to follow-up with Dr. Shirley Jordan at [place] (Phone: [phone]).    Bala Pan MD  Hematology/Oncology  O: [phone]

## 2022-03-14 NOTE — PROGRESS NOTE ADULT - SUBJECTIVE AND OBJECTIVE BOX
GI PROGRESS NOTE    Patient is a 41y old  Male who presents with a chief complaint of Sickle cell crisis (14 Mar 2022 09:02)      HPI:  Patient is a 42 y/o with gout and SCD recently hospitalized 2/24-2/26, 2/28-3/7 for pain crisis presenting with 5 hours of new pain, all over, primarily in abdomen and bilious vomiting. Patient reports he was feeling okay since discharge a few days ago until today at noon when he began having pain and took oxy. The pain progressed and he began vomiting and having diarrhea. Pt reports that he feels cold. Pt reports pain all over his body. (11 Mar 2022 01:54)    GI HPI:  Pt denies nausea, vomiting, abdominal pain, fever, chills or any other issues.       ______________________________________________________________________  PMH/PSH:  PAST MEDICAL & SURGICAL HISTORY:  Sickle cell anemia    Gout    History of cholecystectomy      ______________________________________________________________________  MEDS:  MEDICATIONS  (STANDING):  colchicine 0.6 milliGRAM(s) Oral daily  folic acid 1 milliGRAM(s) Oral <User Schedule>  heparin   Injectable 5000 Unit(s) SubCutaneous every 8 hours  influenza   Vaccine 0.5 milliLiter(s) IntraMuscular once  pantoprazole    Tablet 40 milliGRAM(s) Oral two times a day  senna 2 Tablet(s) Oral at bedtime  sodium chloride 0.9%. 1000 milliLiter(s) (125 mL/Hr) IV Continuous <Continuous>    MEDICATIONS  (PRN):  acetaminophen     Tablet .. 650 milliGRAM(s) Oral every 6 hours PRN Temp greater or equal to 38C (100.4F), Mild Pain (1 - 3)  HYDROmorphone  Injectable 2 milliGRAM(s) IV Push every 3 hours PRN Severe Pain (7 - 10)    ______________________________________________________________________  ALL:   Allergies    ceftriaxone (Anaphylaxis)  hydroxyurea (Other)  penicillin (Pruritus)  piperacillin-tazobactam (Urticaria)    Intolerances      ______________________________________________________________________  SH: Social History:  Patient is from home, non smoker or alcoholic hx (11 Mar 2022 01:54)    ______________________________________________________________________  FH:  FAMILY HISTORY:  Family history of sickle cell trait (Father, Mother)      ______________________________________________________________________  ROS:    CONSTITUTIONAL: No weight loss, fever, chills, weakness or fatigue.    SKIN:  No rash or itching.    CARDIOVASCULAR:  No chest pain, chest pressure or chest discomfort. No palpitations or edema.    RESPIRATORY:  No shortness of breath, cough or sputum.    GASTROINTESTINAL: SEE HPI    MUSCULOSKELETAL:  No muscle, back pain, joint pain or stiffness.    ______________________________________________________________________  PHYSICAL EXAM:  T(C): 36.8 (03-14-22 @ 04:33), Max: 36.9 (03-13-22 @ 19:34)  HR: 84 (03-14-22 @ 04:33)  BP: 138/75 (03-14-22 @ 04:33)  RR: 18 (03-14-22 @ 04:33)  SpO2: 94% (03-14-22 @ 04:33)  Wt(kg): --    03-13 - 03-14  --------------------------------------------------------  IN:    sodium chloride 0.9%: 1300 mL  Total IN: 1300 mL    OUT:  Total OUT: 0 mL    Total NET: 1300 mL      GEN: NAD, normocephalic  HEENT: sclera icteric   CVS: S1S2+  CHEST: clear to auscultation  ABD: soft, nontender, nondistended, bowel sounds present  EXTR: no cyanosis, no clubbing, no edema  NEURO: Awake and alert; oriented x3  SKIN:  warm; icteric +        ______________________________________________________________________  LABS:                        5.3    12.11 )-----------( 289      ( 14 Mar 2022 07:31 )             15.0     03-14    141  |  112<H>  |  8   ----------------------------<  91  3.7   |  24  |  0.70    Ca    8.3<L>      14 Mar 2022 07:31    TPro  6.3  /  Alb  2.9<L>  /  TBili  7.9<H>  /  DBili  4.2<H>  /  AST  129<H>  /  ALT  87<H>  /  AlkPhos  104  03-12    LIVER FUNCTIONS - ( 12 Mar 2022 13:26 )  Alb: 2.9 g/dL / Pro: 6.3 g/dL / ALK PHOS: 104 U/L / ALT: 87 U/L DA / AST: 129 U/L / GGT: x           ______________________________________________________________________   GI PROGRESS NOTE    Patient is a 41y old  Male who presents with a chief complaint of Sickle cell crisis (14 Mar 2022 09:02)      HPI:  Patient is a 40 y/o with gout and SCD recently hospitalized 2/24-2/26, 2/28-3/7 for pain crisis presenting with 5 hours of new pain, all over, primarily in abdomen and bilious vomiting. Patient reports he was feeling okay since discharge a few days ago until today at noon when he began having pain and took oxy. The pain progressed and he began vomiting and having diarrhea. Pt reports that he feels cold. Pt reports pain all over his body. (11 Mar 2022 01:54)    GI HPI:  Pt reports some abdominal pain today but improving. Denies nausea, vomiting, fever, chills or any other issues.       ______________________________________________________________________  PMH/PSH:  PAST MEDICAL & SURGICAL HISTORY:  Sickle cell anemia    Gout    History of cholecystectomy      ______________________________________________________________________  MEDS:  MEDICATIONS  (STANDING):  colchicine 0.6 milliGRAM(s) Oral daily  folic acid 1 milliGRAM(s) Oral <User Schedule>  heparin   Injectable 5000 Unit(s) SubCutaneous every 8 hours  influenza   Vaccine 0.5 milliLiter(s) IntraMuscular once  pantoprazole    Tablet 40 milliGRAM(s) Oral two times a day  senna 2 Tablet(s) Oral at bedtime  sodium chloride 0.9%. 1000 milliLiter(s) (125 mL/Hr) IV Continuous <Continuous>    MEDICATIONS  (PRN):  acetaminophen     Tablet .. 650 milliGRAM(s) Oral every 6 hours PRN Temp greater or equal to 38C (100.4F), Mild Pain (1 - 3)  HYDROmorphone  Injectable 2 milliGRAM(s) IV Push every 3 hours PRN Severe Pain (7 - 10)    ______________________________________________________________________  ALL:   Allergies    ceftriaxone (Anaphylaxis)  hydroxyurea (Other)  penicillin (Pruritus)  piperacillin-tazobactam (Urticaria)    Intolerances      ______________________________________________________________________  SH: Social History:  Patient is from home, non smoker or alcoholic hx (11 Mar 2022 01:54)    ______________________________________________________________________  FH:  FAMILY HISTORY:  Family history of sickle cell trait (Father, Mother)      ______________________________________________________________________  ROS:    CONSTITUTIONAL: No weight loss, fever, chills, weakness or fatigue.    SKIN:  No rash or itching.    CARDIOVASCULAR:  No chest pain, chest pressure or chest discomfort. No palpitations or edema.    RESPIRATORY:  No shortness of breath, cough or sputum.    GASTROINTESTINAL: SEE HPI    MUSCULOSKELETAL:  No muscle, back pain, joint pain or stiffness.    ______________________________________________________________________  PHYSICAL EXAM:  T(C): 36.8 (03-14-22 @ 04:33), Max: 36.9 (03-13-22 @ 19:34)  HR: 84 (03-14-22 @ 04:33)  BP: 138/75 (03-14-22 @ 04:33)  RR: 18 (03-14-22 @ 04:33)  SpO2: 94% (03-14-22 @ 04:33)  Wt(kg): --    03-13 - 03-14  --------------------------------------------------------  IN:    sodium chloride 0.9%: 1300 mL  Total IN: 1300 mL    OUT:  Total OUT: 0 mL    Total NET: 1300 mL      GEN: NAD, normocephalic  HEENT: sclera icteric   CVS: S1S2+  CHEST: clear to auscultation  ABD: soft, nontender, nondistended, bowel sounds present  EXTR: no cyanosis, no clubbing, no edema  NEURO: Awake and alert; oriented x3  SKIN:  warm; icteric +    ______________________________________________________________________  LABS:                        5.3    12.11 )-----------( 289      ( 14 Mar 2022 07:31 )             15.0     03-14    141  |  112<H>  |  8   ----------------------------<  91  3.7   |  24  |  0.70    Ca    8.3<L>      14 Mar 2022 07:31    TPro  6.3  /  Alb  2.9<L>  /  TBili  7.9<H>  /  DBili  4.2<H>  /  AST  129<H>  /  ALT  87<H>  /  AlkPhos  104  03-12    LIVER FUNCTIONS - ( 12 Mar 2022 13:26 )  Alb: 2.9 g/dL / Pro: 6.3 g/dL / ALK PHOS: 104 U/L / ALT: 87 U/L DA / AST: 129 U/L / GGT: x           ______________________________________________________________________

## 2022-03-14 NOTE — PROGRESS NOTE ADULT - SUBJECTIVE AND OBJECTIVE BOX
CHIEF COMPLAINT  Sickle Cell Crisis    HISTORY OF PRESENT ILLNESS  TOLU VARGAS is a 41y Male who presents with a chief complaint of sickle cell crisis.    No acute events. No complaints.    REVIEW OF SYSTEMS  A complete review of systems was performed; negative except per HPI    PHYSICAL EXAM  T(C): 36.8 (03-14-22 @ 04:33), Max: 36.9 (03-13-22 @ 19:34)  HR: 84 (03-14-22 @ 04:33) (72 - 89)  BP: 138/75 (03-14-22 @ 04:33) (132/78 - 148/81)  RR: 18 (03-14-22 @ 04:33) (18 - 18)  SpO2: 94% (03-14-22 @ 04:33) (93% - 96%)  Constitutional: alert, awake, in no acute distress  Eyes: PERRL, EOMI  HEENT: normocephalic, atraumatic  Neck: supple, non-tender  Cardiovascular: normal perfusion, no peripheral edema  Respiratory: normal respiratory efforts; no increased use of accessory muscles  Gastrointestinal: soft, non-tender  Musculoskeletal: normal range of motion, no deformities noted  Neurological: alert, CN II to XI grossly intact  Skin: warm, dry    LABORATORY DATA                        5.3    12.11 )-----------( 289      ( 14 Mar 2022 07:31 )             15.0     03-14    141  |  112<H>  |  8   ----------------------------<  91  3.7   |  24  |  0.70    Ca    8.3<L>      14 Mar 2022 07:31    TPro  6.3  /  Alb  2.9<L>  /  TBili  7.9<H>  /  DBili  4.2<H>  /  AST  129<H>  /  ALT  87<H>  /  AlkPhos  104  03-12

## 2022-03-14 NOTE — PROGRESS NOTE ADULT - ASSESSMENT
Welcome Lora Rod  ×  Update Personal Info  FAQ  Search Results Help  Help  ×Due to maintenance there may be some small downtime tonight 3/8 around 8pm. If you experience continuing issues, close and open your browser and try again.   Patient Search   Multi-Patient Search   MME Calculator   Reports   Drug List   Designation   My RAFAELA #  Data Detail Level: Printer-Friendly View Extended View  Confidential Drug Utilization Report  Search Terms: macey gonzales, 1980Search Date: 03/12/2022 00:43:56 AM  The Drug Utilization Report below displays all of the controlled substance prescriptions, if any, that your patient has filled in the last twelve months. The information displayed on this report is compiled from pharmacy submissions to the Department, and accurately reflects the information as submitted by the pharmacies.    This report was requested by: Lora Rod | Reference #: [id number]    Others' Prescriptions  Patient Name: Macey GonzalesBirth Date: 1980  Address: [address] [place] [id number]: Male  Rx Written	Rx Dispensed	Drug	Quantity	Days Supply	Prescriber Name	Prescriber Rafaela #	Payment Method	Dispenser  02/24/2022	02/24/2022	oxycodone hcl (ir) 30 mg tab	180	30	Jordan, Shirley SOLITARIO	[id number]	Insurance	[facility]   12/30/2021	12/30/2021	oxycodone hcl (ir) 30 mg tab	180	30	BeatriceDarrion farnsworth	[id number]	Insurance	[facility]   12/02/2021	12/02/2021	oxycodone hcl (ir) 30 mg tab	180	30	Jordan, Shirley SOLITARIO	[id number]	Insurance	[facility]   11/04/2021	11/05/2021	oxycodone hcl 30 mg tablet	180	30	Jordan, Shirley SOLITARIO	[id number]	Insurance	[facility]   10/07/2021	10/07/2021	oxycodone hcl 30 mg tablet	180	30	Jordan, Shirley SOLITARIO	[id number]	Insurance	[facility]   08/12/2021	08/12/2021	oxycodone hcl 30 mg tablet	180	30	Jordan, Shirley SOLITARIO	[id number]	Insurance	[facility]   07/15/2021	07/15/2021	oxycodone hcl 30 mg tablet	180	30	Jordan, CeeCourtney MD	[id number]	Insurance	[facility]   05/19/2021	05/19/2021	oxycodone hcl 30 mg tablet	180	30	Shirley Jordan MD	[id number]	Insurance	[facility]

## 2022-03-14 NOTE — PROGRESS NOTE ADULT - SUBJECTIVE AND OBJECTIVE BOX
PGY-1 Progress Note discussed with attending    PAGER #: [[phone]] TILL 5:00 PM  PLEASE CONTACT ON CALL TEAM:  - On Call Team (Please refer to Rubi) FROM 5:00 PM - 8:30PM  - Nightfloat Team FROM 8:30 -7:30 AM    CHIEF COMPLAINT & BRIEF HOSPITAL COURSE:    INTERVAL HPI/OVERNIGHT EVENTS:   Pt was seen and assessed at bedside, pt is complaining of abdominal pain, lower back pain and a headache.     REVIEW OF SYSTEMS:  CONSTITUTIONAL: No fever, weight loss, or fatigue  RESPIRATORY: No cough, wheezing, chills or hemoptysis; No shortness of breath  CARDIOVASCULAR: No chest pain, palpitations, dizziness, or leg swelling  GASTROINTESTINAL: + abdominal pain. No nausea, vomiting, or hematemesis; No diarrhea or constipation. No melena or hematochezia.  GENITOURINARY: No dysuria or hematuria, urinary frequency  NEUROLOGICAL: + headaches, No memory loss, loss of strength, numbness, or tremors  SKIN: No itching, burning, rashes, or lesions     Vital Signs Last 24 Hrs  T(C): 36.9 (14 Mar 2022 13:20), Max: 36.9 (13 Mar 2022 19:34)  T(F): 98.5 (14 Mar 2022 13:20), Max: 98.5 (13 Mar 2022 19:34)  HR: 98 (14 Mar 2022 13:20) (84 - 98)  BP: 150/72 (14 Mar 2022 13:20) (138/75 - 150/72)  BP(mean): --  RR: 18 (14 Mar 2022 13:20) (18 - 18)  SpO2: 93% (14 Mar 2022 13:20) (93% - 94%)    PHYSICAL EXAMINATION:  GENERAL: NAD, well built, jaundiced  HEAD:  Atraumatic, Normocephalic  EYES:  scleral icterus  NECK: Supple, No JVD, Normal thyroid  CHEST/LUNG: Clear to auscultation. Clear to percussion bilaterally; No rales, rhonchi, wheezing, or rubs  HEART: Regular rate and rhythm; No murmurs, rubs, or gallops  ABDOMEN: +epigastric tenderness Soft, Nondistended; Bowel sounds present  NERVOUS SYSTEM:  Alert & Oriented X3,    EXTREMITIES:  2+ Peripheral Pulses, No clubbing, cyanosis, or edema  SKIN: warm dry                          5.3    12.11 )-----------( 289      ( 14 Mar 2022 07:31 )             15.0     03-14    141  |  112<H>  |  8   ----------------------------<  91  3.7   |  24  |  0.70    Ca    8.3<L>      14 Mar 2022 07:31    TPro  6.4  /  Alb  3.0<L>  /  TBili  7.3<H>  /  DBili  3.7<H>  /  AST  112<H>  /  ALT  85<H>  /  AlkPhos  104  03-14    LIVER FUNCTIONS - ( 14 Mar 2022 07:31 )  Alb: 3.0 g/dL / Pro: 6.4 g/dL / ALK PHOS: 104 U/L / ALT: 85 U/L DA / AST: 112 U/L / GGT: x                   CAPILLARY BLOOD GLUCOSE      RADIOLOGY & ADDITIONAL TESTS:                  Pt

## 2022-03-15 LAB
ANION GAP SERPL CALC-SCNC: 6 MMOL/L — SIGNIFICANT CHANGE UP (ref 5–17)
BILIRUB SERPL-MCNC: 7.6 MG/DL — HIGH (ref 0.2–1.2)
BLD GP AB SCN SERPL QL: SIGNIFICANT CHANGE UP
BUN SERPL-MCNC: 6 MG/DL — LOW (ref 7–18)
CALCIUM SERPL-MCNC: 8.6 MG/DL — SIGNIFICANT CHANGE UP (ref 8.4–10.5)
CHLORIDE SERPL-SCNC: 112 MMOL/L — HIGH (ref 96–108)
CO2 SERPL-SCNC: 23 MMOL/L — SIGNIFICANT CHANGE UP (ref 22–31)
CREAT SERPL-MCNC: 0.68 MG/DL — SIGNIFICANT CHANGE UP (ref 0.5–1.3)
EGFR: 119 ML/MIN/1.73M2 — SIGNIFICANT CHANGE UP
GLUCOSE SERPL-MCNC: 85 MG/DL — SIGNIFICANT CHANGE UP (ref 70–99)
HCT VFR BLD CALC: 15.4 % — CRITICAL LOW (ref 39–50)
HGB BLD-MCNC: 5.5 G/DL — CRITICAL LOW (ref 13–17)
INR BLD: 1.19 RATIO — HIGH (ref 0.88–1.16)
MAGNESIUM SERPL-MCNC: 1.5 MG/DL — LOW (ref 1.6–2.6)
MCHC RBC-ENTMCNC: 32.5 PG — SIGNIFICANT CHANGE UP (ref 27–34)
MCHC RBC-ENTMCNC: 35.7 GM/DL — SIGNIFICANT CHANGE UP (ref 32–36)
MCV RBC AUTO: 91.1 FL — SIGNIFICANT CHANGE UP (ref 80–100)
MELD SCORE WITH DIALYSIS: 29 POINTS — SIGNIFICANT CHANGE UP
MELD SCORE WITHOUT DIALYSIS: 16 POINTS — SIGNIFICANT CHANGE UP
NRBC # BLD: 0 /100 WBCS — SIGNIFICANT CHANGE UP (ref 0–0)
PHOSPHATE SERPL-MCNC: 2.5 MG/DL — SIGNIFICANT CHANGE UP (ref 2.5–4.5)
PLATELET # BLD AUTO: 317 K/UL — SIGNIFICANT CHANGE UP (ref 150–400)
POTASSIUM SERPL-MCNC: 3.5 MMOL/L — SIGNIFICANT CHANGE UP (ref 3.5–5.3)
POTASSIUM SERPL-SCNC: 3.5 MMOL/L — SIGNIFICANT CHANGE UP (ref 3.5–5.3)
PROTHROM AB SERPL-ACNC: 14.2 SEC — HIGH (ref 10.5–13.4)
RBC # BLD: 1.69 M/UL — LOW (ref 4.2–5.8)
RBC # FLD: 21.5 % — HIGH (ref 10.3–14.5)
SODIUM SERPL-SCNC: 141 MMOL/L — SIGNIFICANT CHANGE UP (ref 135–145)
WBC # BLD: 13.31 K/UL — HIGH (ref 3.8–10.5)
WBC # FLD AUTO: 13.31 K/UL — HIGH (ref 3.8–10.5)

## 2022-03-15 PROCEDURE — 99232 SBSQ HOSP IP/OBS MODERATE 35: CPT

## 2022-03-15 PROCEDURE — 99233 SBSQ HOSP IP/OBS HIGH 50: CPT | Mod: GC

## 2022-03-15 RX ORDER — MAGNESIUM SULFATE 500 MG/ML
1 VIAL (ML) INJECTION ONCE
Refills: 0 | Status: COMPLETED | OUTPATIENT
Start: 2022-03-15 | End: 2022-03-15

## 2022-03-15 RX ORDER — HYDROMORPHONE HYDROCHLORIDE 2 MG/ML
1 INJECTION INTRAMUSCULAR; INTRAVENOUS; SUBCUTANEOUS ONCE
Refills: 0 | Status: DISCONTINUED | OUTPATIENT
Start: 2022-03-15 | End: 2022-03-15

## 2022-03-15 RX ORDER — HYDROMORPHONE HYDROCHLORIDE 2 MG/ML
3 INJECTION INTRAMUSCULAR; INTRAVENOUS; SUBCUTANEOUS
Refills: 0 | Status: DISCONTINUED | OUTPATIENT
Start: 2022-03-15 | End: 2022-03-16

## 2022-03-15 RX ORDER — DIPHENHYDRAMINE HCL 50 MG
50 CAPSULE ORAL ONCE
Refills: 0 | Status: COMPLETED | OUTPATIENT
Start: 2022-03-15 | End: 2022-03-15

## 2022-03-15 RX ADMIN — Medication 650 MILLIGRAM(S): at 08:14

## 2022-03-15 RX ADMIN — Medication 0.6 MILLIGRAM(S): at 12:32

## 2022-03-15 RX ADMIN — HYDROMORPHONE HYDROCHLORIDE 2 MILLIGRAM(S): 2 INJECTION INTRAMUSCULAR; INTRAVENOUS; SUBCUTANEOUS at 04:57

## 2022-03-15 RX ADMIN — Medication 1 MILLIGRAM(S): at 17:29

## 2022-03-15 RX ADMIN — SODIUM CHLORIDE 125 MILLILITER(S): 9 INJECTION INTRAMUSCULAR; INTRAVENOUS; SUBCUTANEOUS at 08:31

## 2022-03-15 RX ADMIN — Medication 650 MILLIGRAM(S): at 20:43

## 2022-03-15 RX ADMIN — HYDROMORPHONE HYDROCHLORIDE 2 MILLIGRAM(S): 2 INJECTION INTRAMUSCULAR; INTRAVENOUS; SUBCUTANEOUS at 14:18

## 2022-03-15 RX ADMIN — HYDROMORPHONE HYDROCHLORIDE 2 MILLIGRAM(S): 2 INJECTION INTRAMUSCULAR; INTRAVENOUS; SUBCUTANEOUS at 18:23

## 2022-03-15 RX ADMIN — HYDROMORPHONE HYDROCHLORIDE 2 MILLIGRAM(S): 2 INJECTION INTRAMUSCULAR; INTRAVENOUS; SUBCUTANEOUS at 15:21

## 2022-03-15 RX ADMIN — SODIUM CHLORIDE 125 MILLILITER(S): 9 INJECTION INTRAMUSCULAR; INTRAVENOUS; SUBCUTANEOUS at 15:55

## 2022-03-15 RX ADMIN — HYDROMORPHONE HYDROCHLORIDE 3 MILLIGRAM(S): 2 INJECTION INTRAMUSCULAR; INTRAVENOUS; SUBCUTANEOUS at 20:40

## 2022-03-15 RX ADMIN — HYDROMORPHONE HYDROCHLORIDE 2 MILLIGRAM(S): 2 INJECTION INTRAMUSCULAR; INTRAVENOUS; SUBCUTANEOUS at 17:28

## 2022-03-15 RX ADMIN — Medication 650 MILLIGRAM(S): at 21:43

## 2022-03-15 RX ADMIN — SENNA PLUS 2 TABLET(S): 8.6 TABLET ORAL at 22:42

## 2022-03-15 RX ADMIN — HYDROMORPHONE HYDROCHLORIDE 1 MILLIGRAM(S): 2 INJECTION INTRAMUSCULAR; INTRAVENOUS; SUBCUTANEOUS at 09:58

## 2022-03-15 RX ADMIN — HEPARIN SODIUM 5000 UNIT(S): 5000 INJECTION INTRAVENOUS; SUBCUTANEOUS at 05:07

## 2022-03-15 RX ADMIN — HYDROMORPHONE HYDROCHLORIDE 2 MILLIGRAM(S): 2 INJECTION INTRAMUSCULAR; INTRAVENOUS; SUBCUTANEOUS at 02:15

## 2022-03-15 RX ADMIN — Medication 1 MILLIGRAM(S): at 08:18

## 2022-03-15 RX ADMIN — HYDROMORPHONE HYDROCHLORIDE 2 MILLIGRAM(S): 2 INJECTION INTRAMUSCULAR; INTRAVENOUS; SUBCUTANEOUS at 01:57

## 2022-03-15 RX ADMIN — HYDROMORPHONE HYDROCHLORIDE 2 MILLIGRAM(S): 2 INJECTION INTRAMUSCULAR; INTRAVENOUS; SUBCUTANEOUS at 08:09

## 2022-03-15 RX ADMIN — Medication 100 GRAM(S): at 15:54

## 2022-03-15 RX ADMIN — HYDROMORPHONE HYDROCHLORIDE 2 MILLIGRAM(S): 2 INJECTION INTRAMUSCULAR; INTRAVENOUS; SUBCUTANEOUS at 05:12

## 2022-03-15 RX ADMIN — HYDROMORPHONE HYDROCHLORIDE 2 MILLIGRAM(S): 2 INJECTION INTRAMUSCULAR; INTRAVENOUS; SUBCUTANEOUS at 11:26

## 2022-03-15 RX ADMIN — HYDROMORPHONE HYDROCHLORIDE 1 MILLIGRAM(S): 2 INJECTION INTRAMUSCULAR; INTRAVENOUS; SUBCUTANEOUS at 09:04

## 2022-03-15 RX ADMIN — HYDROMORPHONE HYDROCHLORIDE 3 MILLIGRAM(S): 2 INJECTION INTRAMUSCULAR; INTRAVENOUS; SUBCUTANEOUS at 20:55

## 2022-03-15 RX ADMIN — PANTOPRAZOLE SODIUM 40 MILLIGRAM(S): 20 TABLET, DELAYED RELEASE ORAL at 05:07

## 2022-03-15 RX ADMIN — PANTOPRAZOLE SODIUM 40 MILLIGRAM(S): 20 TABLET, DELAYED RELEASE ORAL at 17:29

## 2022-03-15 RX ADMIN — HEPARIN SODIUM 5000 UNIT(S): 5000 INJECTION INTRAVENOUS; SUBCUTANEOUS at 14:17

## 2022-03-15 RX ADMIN — HEPARIN SODIUM 5000 UNIT(S): 5000 INJECTION INTRAVENOUS; SUBCUTANEOUS at 22:42

## 2022-03-15 RX ADMIN — Medication 650 MILLIGRAM(S): at 08:53

## 2022-03-15 RX ADMIN — HYDROMORPHONE HYDROCHLORIDE 2 MILLIGRAM(S): 2 INJECTION INTRAMUSCULAR; INTRAVENOUS; SUBCUTANEOUS at 12:29

## 2022-03-15 RX ADMIN — HYDROMORPHONE HYDROCHLORIDE 2 MILLIGRAM(S): 2 INJECTION INTRAMUSCULAR; INTRAVENOUS; SUBCUTANEOUS at 08:53

## 2022-03-15 NOTE — PROGRESS NOTE ADULT - ASSESSMENT
Patient is a 42 y/o with gout and SCD recently hospitalized 2/24-2/26, 2/28-3/7 for pain crisis presenting with 5 hours of new pain, all over, primarily in abdomen and bilious vomiting. Pt without N/V; epigastric abdominal pain.

## 2022-03-15 NOTE — PROGRESS NOTE ADULT - PROBLEM SELECTOR PLAN 1
Pt with generalized body pain, back, left hip and epigastric pain which is somatic  in nature due to sickle cell crisis and gastritis. Pt on chronic opioids at home oxycodone 30mg q4-6h PRN. Retic percent 15.9. Bilirubin 7.9. CTAP demonstrates Hepatic morphology is nonspecific but can be seen with underlying cirrhosis. Enlarged upper abdominal lymph nodes are nonspecific and can be seen in the setting of portal hypertension. MRI abdomen demonstrates likely cirrhosis, small ascities, b/l pleural effusions.   Opioid pain recommendations   - Dilaudid 1mg IV once now.   - Continue Dilaudid 2mg IV q3h PRN severe pain. Monitor for sedation/ respiratory depression.   Non-opioid pain recommendations   - Pt with cirrhosis and transaminitis- caution use of acetaminophen  - Avoid nsaids- gastritis   Bowel Regimen  - Continue Miralax 17G PO daily  Mild pain   - Non-pharmacological pain treatment recommendations  - Warm/ Cool packs PRN   - Repositioning, imagery, relaxation, distraction.  - OOB if no contraindications   Continue IVF per primary team.   Recommendations discussed with primary team and RN Pt with generalized body pain, back, left hip and epigastric pain which is somatic  in nature due to sickle cell crisis and gastritis. Pt on chronic opioids at home oxycodone 30mg q4-6h PRN. Retic percent 15.9. Bilirubin 7.9. CTAP demonstrates Hepatic morphology is nonspecific but can be seen with underlying cirrhosis. Enlarged upper abdominal lymph nodes are nonspecific and can be seen in the setting of portal hypertension. MRI abdomen demonstrates likely cirrhosis, small ascites, b/l pleural effusions.   Opioid pain recommendations   - Dilaudid 1mg IV once now.   - Continue Dilaudid 2mg IV q3h PRN severe pain. Monitor for sedation/ respiratory depression.   - Plan to transition to orals tomorrow.   Non-opioid pain recommendations   - Pt with cirrhosis and transaminitis- caution use of acetaminophen  - Avoid nsaids- gastritis   Bowel Regimen  - Continue Miralax 17G PO daily  Mild pain   - Non-pharmacological pain treatment recommendations  - Warm/ Cool packs PRN   - Repositioning, imagery, relaxation, distraction.  - OOB if no contraindications   Continue IVF per primary team.   Recommendations discussed with primary team and RN

## 2022-03-15 NOTE — PROGRESS NOTE ADULT - ASSESSMENT
Welcome Lora Rod  ×  Update Personal Info  FAQ  Search Results Help  Help  ×Due to maintenance there may be some small downtime tonight 3/8 around 8pm. If you experience continuing issues, close and open your browser and try again.   Patient Search   Multi-Patient Search   MME Calculator   Reports   Drug List   Designation   My RAFAELA #  Data Detail Level: Printer-Friendly View Extended View  Confidential Drug Utilization Report  Search Terms: macey gonzales, 1980Search Date: 03/12/2022 00:43:56 AM  The Drug Utilization Report below displays all of the controlled substance prescriptions, if any, that your patient has filled in the last twelve months. The information displayed on this report is compiled from pharmacy submissions to the Department, and accurately reflects the information as submitted by the pharmacies.    This report was requested by: Lora Rod | Reference #: [id number]    Others' Prescriptions  Patient Name: Macey GonzalesBirth Date: 1980  Address: [address] [place] [id number]: Male  Rx Written	Rx Dispensed	Drug	Quantity	Days Supply	Prescriber Name	Prescriber Rafaela #	Payment Method	Dispenser  02/24/2022	02/24/2022	oxycodone hcl (ir) 30 mg tab	180	30	Jordan, Shirley SOLITARIO	[id number]	Insurance	[facility]   12/30/2021	12/30/2021	oxycodone hcl (ir) 30 mg tab	180	30	BeatriceDarrion farnsworth	[id number]	Insurance	[facility]   12/02/2021	12/02/2021	oxycodone hcl (ir) 30 mg tab	180	30	Jordan, Shirley SOLITARIO	[id number]	Insurance	[facility]   11/04/2021	11/05/2021	oxycodone hcl 30 mg tablet	180	30	Jordan, Shirley SOLITARIO	[id number]	Insurance	[facility]   10/07/2021	10/07/2021	oxycodone hcl 30 mg tablet	180	30	Jordan, Shirley SOLITARIO	[id number]	Insurance	[facility]   08/12/2021	08/12/2021	oxycodone hcl 30 mg tablet	180	30	Jordan, Shirley SOLITARIO	[id number]	Insurance	[facility]   07/15/2021	07/15/2021	oxycodone hcl 30 mg tablet	180	30	Jordan, CeeCourtney MD	[id number]	Insurance	[facility]   05/19/2021	05/19/2021	oxycodone hcl 30 mg tablet	180	30	Shirley Jordan MD	[id number]	Insurance	[facility]

## 2022-03-15 NOTE — PROGRESS NOTE ADULT - PROBLEM SELECTOR PLAN 1
H/H stable Patient with known sickle cell disease. Baseline hemoglobin around 5 to 6.   On Hydromorphone IV  Pain management consulted  Dilaudid 2mg IV q3h PRN severe pain  Heme/onc Dr. Jordan following   c/w IV fluids

## 2022-03-15 NOTE — PROGRESS NOTE ADULT - SUBJECTIVE AND OBJECTIVE BOX
PGY-1 Progress Note discussed with attending    PAGER #: [[phone]] TILL 5:00 PM  PLEASE CONTACT ON CALL TEAM:  - On Call Team (Please refer to Rubi) FROM 5:00 PM - 8:30PM  - Nightfloat Team FROM 8:30 -7:30 AM    CHIEF COMPLAINT & BRIEF HOSPITAL COURSE:    INTERVAL HPI/OVERNIGHT EVENTS:   Pt was seen and assessed at bedside. Pt complains of severe lower back and waist pain, rated as a 10/10, sharp in nature and radiates to the rib.    REVIEW OF SYSTEMS:  CONSTITUTIONAL: No fever, weight loss, or fatigue  RESPIRATORY: No cough, wheezing, chills or hemoptysis; No shortness of breath  CARDIOVASCULAR: No chest pain, palpitations, dizziness, or leg swelling  GASTROINTESTINAL: + abdominal pain. + nausea. No vomiting, or hematemesis; No diarrhea or constipation. No melena or hematochezia.  GENITOURINARY: No dysuria or hematuria, urinary frequency  NEUROLOGICAL: + headaches, No memory loss, loss of strength, numbness, or tremors  SKIN: No itching, burning, rashes, or lesions     Vital Signs Last 24 Hrs  T(C): 37.1 (15 Mar 2022 14:08), Max: 37.1 (14 Mar 2022 19:52)  T(F): 98.7 (15 Mar 2022 14:08), Max: 98.8 (14 Mar 2022 19:52)  HR: 71 (15 Mar 2022 14:08) (71 - 79)  BP: 146/78 (15 Mar 2022 14:08) (146/78 - 155/83)  BP(mean): --  RR: 17 (15 Mar 2022 14:08) (16 - 18)  SpO2: 94% (15 Mar 2022 14:08) (92% - 96%)    PHYSICAL EXAMINATION:  GENERAL: NAD, well built, jaundiced  HEAD:  Atraumatic, Normocephalic  EYES:  scleral icterus  NECK: Supple, No JVD, Normal thyroid  CHEST/LUNG: Clear to auscultation. Clear to percussion bilaterally; No rales, rhonchi, wheezing, or rubs  HEART: Regular rate and rhythm; No murmurs, rubs, or gallops  ABDOMEN: + epigastric tenderness, Soft, Nondistended; Bowel sounds present  NERVOUS SYSTEM:  Alert & Oriented X3,    EXTREMITIES:  2+ Peripheral Pulses, No clubbing, cyanosis, or edema  SKIN: warm                           5.5    13.31 )-----------( 317      ( 15 Mar 2022 07:01 )             15.4     03-15    141  |  112<H>  |  6<L>  ----------------------------<  85  3.5   |  23  |  0.68    Ca    8.6      15 Mar 2022 07:01  Phos  2.5     03-15  Mg     1.5     03-15    TPro  x   /  Alb  x   /  TBili  7.6<H>  /  DBili  x   /  AST  x   /  ALT  x   /  AlkPhos  x   03-15    LIVER FUNCTIONS - ( 14 Mar 2022 07:31 )  Alb: 3.0 g/dL / Pro: 6.4 g/dL / ALK PHOS: 104 U/L / ALT: 85 U/L DA / AST: 112 U/L / GGT: x               PT/INR - ( 15 Mar 2022 07:01 )   PT: 14.2 sec;   INR: 1.19 ratio             CAPILLARY BLOOD GLUCOSE      RADIOLOGY & ADDITIONAL TESTS:

## 2022-03-15 NOTE — PROGRESS NOTE ADULT - SUBJECTIVE AND OBJECTIVE BOX
GI PROGRESS NOTE    Patient is a 41y old  Male who presents with a chief complaint of Sickle cell crisis (15 Mar 2022 10:11)    HPI:  Patient is a 42 y/o with gout and SCD recently hospitalized 2/24-2/26, 2/28-3/7 for pain crisis presenting with 5 hours of new pain, all over, primarily in abdomen and bilious vomiting. Patient reports he was feeling okay since discharge a few days ago until today at noon when he began having pain and took oxy. The pain progressed and he began vomiting and having diarrhea. Pt reports that he feels cold. Pt reports pain all over his body. (11 Mar 2022 01:54)    GI HPI:  Pt with sickle cell pain (describes back pain and "rib pain" as his usual sickle cell symptoms). Pt also endorses some epigastric pain.   Denies nausea, vomiting, diarrhea or constipation         ______________________________________________________________________  PMH/PSH:  PAST MEDICAL & SURGICAL HISTORY:  Sickle cell anemia    Gout    History of cholecystectomy      ______________________________________________________________________  MEDS:  MEDICATIONS  (STANDING):  colchicine 0.6 milliGRAM(s) Oral daily  folic acid 1 milliGRAM(s) Oral <User Schedule>  heparin   Injectable 5000 Unit(s) SubCutaneous every 8 hours  influenza   Vaccine 0.5 milliLiter(s) IntraMuscular once  pantoprazole    Tablet 40 milliGRAM(s) Oral two times a day  senna 2 Tablet(s) Oral at bedtime  sodium chloride 0.9%. 1000 milliLiter(s) (125 mL/Hr) IV Continuous <Continuous>    MEDICATIONS  (PRN):  acetaminophen     Tablet .. 650 milliGRAM(s) Oral every 6 hours PRN Temp greater or equal to 38C (100.4F), Mild Pain (1 - 3)  HYDROmorphone  Injectable 2 milliGRAM(s) IV Push every 3 hours PRN Severe Pain (7 - 10)    ______________________________________________________________________  ALL:   Allergies    ceftriaxone (Anaphylaxis)  hydroxyurea (Other)  penicillin (Pruritus)  piperacillin-tazobactam (Urticaria)    Intolerances      ______________________________________________________________________  SH: Social History:  Patient is from home, non smoker or alcoholic hx (11 Mar 2022 01:54)    ______________________________________________________________________  FH:  FAMILY HISTORY:  Family history of sickle cell trait (Father, Mother)      ______________________________________________________________________  ROS:    CONSTITUTIONAL:  No weight loss, fever, chills, weakness or fatigue.    HEENT:  Eyes:  No visual loss, blurred vision, double vision; + yellow sclerae    SKIN:  No rash or itching.    CARDIOVASCULAR:  No chest pain, chest pressure or chest discomfort. No palpitations or edema.    RESPIRATORY:  No shortness of breath, cough or sputum.    GASTROINTESTINAL:  SEE HPI    GENITOURINARY:  No dysuria, hematuria, urinary frequency    NEUROLOGICAL:  No headache, dizziness, syncope    MUSCULOSKELETAL: + back pain, + "rib pain"    HEMATOLOGIC:  No anemia, bleeding or bruising.    ______________________________________________________________________  PHYSICAL EXAM:  T(C): 36.6 (03-15-22 @ 05:26), Max: 37.1 (03-14-22 @ 19:52)  HR: 77 (03-15-22 @ 05:26)  BP: 155/83 (03-15-22 @ 05:26)  RR: 18 (03-15-22 @ 05:26)  SpO2: 92% (03-15-22 @ 05:26)  Wt(kg): --    03-14  -  03-15  --------------------------------------------------------  IN:  Total IN: 0 mL    OUT:    Stool (mL): 2 mL  Total OUT: 2 mL    Total NET: -2 mL      03-15  -  03-15  --------------------------------------------------------  IN:    Oral Fluid: 625 mL  Total IN: 625 mL    OUT:  Total OUT: 0 mL    Total NET: 625 mL    GEN: NAD, normocephalic  HEENT: + sclera icteric   CVS: S1S2+  CHEST: clear to auscultation  ABD: soft, minimal tenderness epigastric region, nondistended, bowel sounds present  EXTR: no cyanosis, no clubbing, no edema  NEURO: Awake and alert; oriented x3  SKIN:  warm; icteric +      ______________________________________________________________________  LABS:                        5.5    13.31 )-----------( 317      ( 15 Mar 2022 07:01 )             15.4     03-15    141  |  112<H>  |  6<L>  ----------------------------<  85  3.5   |  23  |  0.68    Ca    8.6      15 Mar 2022 07:01  Phos  2.5     03-15  Mg     1.5     03-15    TPro  x   /  Alb  x   /  TBili  7.6<H>  /  DBili  x   /  AST  x   /  ALT  x   /  AlkPhos  x   03-15    LIVER FUNCTIONS - ( 14 Mar 2022 07:31 )  Alb: 3.0 g/dL / Pro: 6.4 g/dL / ALK PHOS: 104 U/L / ALT: 85 U/L DA / AST: 112 U/L / GGT: x           ______________________________________________________________________  IMAGING:  < from: MR Abdomen w/wo IV Cont (03.14.22 @ 19:17) >  ACC: [id number] EXAM:  MR ABDOMEN [facility]                          PROCEDURE DATE:  03/14/2022      INTERPRETATION:  CLINICAL INFORMATION: Hyperbilirubinemia. Evaluation for   biliary system. History of sickle cell disease. Multiple packed red blood   cell transfusions in the past.    COMPARISON: No prior abdominal MR is available for comparison. Reference   is made with a previous abdominal CT dated 3/10/2022    CONTRAST/COMPLICATIONS:  IV Contrast: Gadavist  8.5 cc administered   1.5 cc discarded  Oral Contrast: NONE  Complications: None reported at time of study completion    PROCEDURE:  MRI of the abdomen was performed.  MRCP was performed.    FINDINGS:  LOWER CHEST: Mild bilateral pleural effusions, right greater than left.    LIVER: The liver appears diffusely dark on T2-weighted images. In   addition, there is signal attenuation of the liver on in-phase   T1-weighted gradient echo images. Findings likely due to iron overload   from prior multiple packed red blood cell transfusions. Slight nodular   contour of the liver may represent cirrhosis. Clinical correlation is   recommended. No suspicious focal hepatic lesion is identified.    BILE DUCTS: Normal caliber. Common bile duct measures up to 3 mm in   caliber which is within normal limits. No evidence for choledocholithiasis  GALLBLADDER: Status post cholecystectomy.  SPLEEN: Atrophic and diffusely calcified, likely due to autoinfarction   from patient's known sickle cell disease.  PANCREAS: Within normal limits.  ADRENALS: Within normal limits.  KIDNEYS/URETERS: A few small bilateral renal cysts. One of the left renal   cysts is hemorrhagic or proteinaceous.    VISUALIZED PORTIONS:  BOWEL: Within normal limits.  PERITONEUM: Small ascites.  VESSELS: The portal veinsand hepatic veins are patent.  RETROPERITONEUM/LYMPH NODES: No lymphadenopathy.  ABDOMINAL WALL: Within normal limits.  BONES: Within normal limits.    IMPRESSION:  Abnormal liver signal as detailed above, likely due to iron overload from   prior multiple packed red blood cell transfusions. Slight nodular contour   of the liver may represent cirrhosis. Clinical correlation is   recommended. No suspicious focal hepatic lesion is identified.    Common bile duct measures up to 3 mm in caliber which is within normal   limits. No evidence for choledocholithiasis.    Small ascites.    Mild bilateral pleural effusions.     GI PROGRESS NOTE    Patient is a 41y old  Male who presents with a chief complaint of Sickle cell crisis (15 Mar 2022 10:11)    HPI:  Patient is a 40 y/o with gout and SCD recently hospitalized 2/24-2/26, 2/28-3/7 for pain crisis presenting with 5 hours of new pain, all over, primarily in abdomen and bilious vomiting. Patient reports he was feeling okay since discharge a few days ago until today at noon when he began having pain and took oxy. The pain progressed and he began vomiting and having diarrhea. Pt reports that he feels cold. Pt reports pain all over his body. (11 Mar 2022 01:54)    GI HPI:  Pt with sickle cell pain (describes back pain and "rib pain" as his usual sickle cell symptoms). RN at bedside for pain assessment. Pt also endorses some epigastric pain.   Denies nausea, vomiting, diarrhea or constipation         ______________________________________________________________________  PMH/PSH:  PAST MEDICAL & SURGICAL HISTORY:  Sickle cell anemia    Gout    History of cholecystectomy      ______________________________________________________________________  MEDS:  MEDICATIONS  (STANDING):  colchicine 0.6 milliGRAM(s) Oral daily  folic acid 1 milliGRAM(s) Oral <User Schedule>  heparin   Injectable 5000 Unit(s) SubCutaneous every 8 hours  influenza   Vaccine 0.5 milliLiter(s) IntraMuscular once  pantoprazole    Tablet 40 milliGRAM(s) Oral two times a day  senna 2 Tablet(s) Oral at bedtime  sodium chloride 0.9%. 1000 milliLiter(s) (125 mL/Hr) IV Continuous <Continuous>    MEDICATIONS  (PRN):  acetaminophen     Tablet .. 650 milliGRAM(s) Oral every 6 hours PRN Temp greater or equal to 38C (100.4F), Mild Pain (1 - 3)  HYDROmorphone  Injectable 2 milliGRAM(s) IV Push every 3 hours PRN Severe Pain (7 - 10)    ______________________________________________________________________  ALL:   Allergies    ceftriaxone (Anaphylaxis)  hydroxyurea (Other)  penicillin (Pruritus)  piperacillin-tazobactam (Urticaria)    Intolerances      ______________________________________________________________________  SH: Social History:  Patient is from home, non smoker or alcoholic hx (11 Mar 2022 01:54)    ______________________________________________________________________  FH:  FAMILY HISTORY:  Family history of sickle cell trait (Father, Mother)      ______________________________________________________________________  ROS:    CONSTITUTIONAL:  No weight loss, fever, chills, weakness or fatigue.    HEENT:  Eyes:  No visual loss, blurred vision, double vision; + yellow sclerae    SKIN:  No rash or itching.    CARDIOVASCULAR:  No chest pain, chest pressure or chest discomfort. No palpitations or edema.    RESPIRATORY:  No shortness of breath, cough or sputum.    GASTROINTESTINAL:  SEE HPI    GENITOURINARY:  No dysuria, hematuria, urinary frequency    NEUROLOGICAL:  No headache, dizziness, syncope    MUSCULOSKELETAL: + back pain, + "rib pain"    HEMATOLOGIC:  No anemia, bleeding or bruising.    ______________________________________________________________________  PHYSICAL EXAM:  T(C): 36.6 (03-15-22 @ 05:26), Max: 37.1 (03-14-22 @ 19:52)  HR: 77 (03-15-22 @ 05:26)  BP: 155/83 (03-15-22 @ 05:26)  RR: 18 (03-15-22 @ 05:26)  SpO2: 92% (03-15-22 @ 05:26)  Wt(kg): --    03-14  -  03-15  --------------------------------------------------------  IN:  Total IN: 0 mL    OUT:    Stool (mL): 2 mL  Total OUT: 2 mL    Total NET: -2 mL      03-15  -  03-15  --------------------------------------------------------  IN:    Oral Fluid: 625 mL  Total IN: 625 mL    OUT:  Total OUT: 0 mL    Total NET: 625 mL    GEN: NAD, normocephalic  HEENT: + sclera icteric   CVS: S1S2+  CHEST: clear to auscultation  BACK: + tenderness on lower back   ABD: soft, minimal tenderness epigastric region, nondistended, bowel sounds present  EXTR: no cyanosis, no clubbing, no edema  NEURO: Awake and alert; oriented x3  SKIN:  warm; icteric +      ______________________________________________________________________  LABS:                        5.5    13.31 )-----------( 317      ( 15 Mar 2022 07:01 )             15.4     03-15    141  |  112<H>  |  6<L>  ----------------------------<  85  3.5   |  23  |  0.68    Ca    8.6      15 Mar 2022 07:01  Phos  2.5     03-15  Mg     1.5     03-15    TPro  x   /  Alb  x   /  TBili  7.6<H>  /  DBili  x   /  AST  x   /  ALT  x   /  AlkPhos  x   03-15    LIVER FUNCTIONS - ( 14 Mar 2022 07:31 )  Alb: 3.0 g/dL / Pro: 6.4 g/dL / ALK PHOS: 104 U/L / ALT: 85 U/L DA / AST: 112 U/L / GGT: x           ______________________________________________________________________  IMAGING:  < from: MR Abdomen w/wo IV Cont (03.14.22 @ 19:17) >  ACC: [id number] EXAM:  MR ABDOMEN [facility]                          PROCEDURE DATE:  03/14/2022      INTERPRETATION:  CLINICAL INFORMATION: Hyperbilirubinemia. Evaluation for   biliary system. History of sickle cell disease. Multiple packed red blood   cell transfusions in the past.    COMPARISON: No prior abdominal MR is available for comparison. Reference   is made with a previous abdominal CT dated 3/10/2022    CONTRAST/COMPLICATIONS:  IV Contrast: Gadavist  8.5 cc administered   1.5 cc discarded  Oral Contrast: NONE  Complications: None reported at time of study completion    PROCEDURE:  MRI of the abdomen was performed.  MRCP was performed.    FINDINGS:  LOWER CHEST: Mild bilateral pleural effusions, right greater than left.    LIVER: The liver appears diffusely dark on T2-weighted images. In   addition, there is signal attenuation of the liver on in-phase   T1-weighted gradient echo images. Findings likely due to iron overload   from prior multiple packed red blood cell transfusions. Slight nodular   contour of the liver may represent cirrhosis. Clinical correlation is   recommended. No suspicious focal hepatic lesion is identified.    BILE DUCTS: Normal caliber. Common bile duct measures up to 3 mm in   caliber which is within normal limits. No evidence for choledocholithiasis  GALLBLADDER: Status post cholecystectomy.  SPLEEN: Atrophic and diffusely calcified, likely due to autoinfarction   from patient's known sickle cell disease.  PANCREAS: Within normal limits.  ADRENALS: Within normal limits.  KIDNEYS/URETERS: A few small bilateral renal cysts. One of the left renal   cysts is hemorrhagic or proteinaceous.    VISUALIZED PORTIONS:  BOWEL: Within normal limits.  PERITONEUM: Small ascites.  VESSELS: The portal veinsand hepatic veins are patent.  RETROPERITONEUM/LYMPH NODES: No lymphadenopathy.  ABDOMINAL WALL: Within normal limits.  BONES: Within normal limits.    IMPRESSION:  Abnormal liver signal as detailed above, likely due to iron overload from   prior multiple packed red blood cell transfusions. Slight nodular contour   of the liver may represent cirrhosis. Clinical correlation is   recommended. No suspicious focal hepatic lesion is identified.    Common bile duct measures up to 3 mm in caliber which is within normal   limits. No evidence for choledocholithiasis.    Small ascites.    Mild bilateral pleural effusions.

## 2022-03-15 NOTE — PROGRESS NOTE ADULT - SUBJECTIVE AND OBJECTIVE BOX
Source of information: TOLU VARGAS, Chart review  Patient language: English  : n/a    HPI:  Patient is a 42 y/o with gout and SCD recently hospitalized 2/24-2/26, 2/28-3/7 for pain crisis presenting with 5 hours of new pain, all over, primarily in abdomen and bilious vomiting. Patient reports he was feeling okay since discharge a few days ago until today at noon when he began having pain and took oxy. The pain progressed and he began vomiting and having diarrhea. Pt reports that he feels cold. Pt reports pain all over his body. (11 Mar 2022 01:54)    Pt diagnosed with sickle cell crisis. Retic percent 15.9. Bilirubin 7.9. CTAP demonstrates Hepatic morphology is nonspecific but can be seen with underlying   cirrhosis. Enlarged upper abdominal lymph nodes are nonspecific and can be seen in the setting of portal hypertension.  MRI abdomen 3/14 demonstrates- Abnormal liver signal as detailed above, likely due to iron overload from prior multiple packed red blood cell transfusions. Slight nodular contour of the liver may represent cirrhosis. Clinical correlation is recommended. No suspicious focal hepatic lesion is identified. Common bile duct measures up to 3 mm in caliber which is within normal   limits. No evidence for choledocholithiasis. Small ascites. Mild bilateral pleural effusions.    Pain consulted for sickle cell pain. Pt seen and examined at bedside. Pt reports back, pain greatest over low back. Pt describes pain as aching, intermittently sharp, non- radiating, not alleviated by current pain medications, exacerbated by movement. Pt received dilaudid 2mg IV 1 hour prior to encounter. States overnight pain was well controlled with prn pain meds, however this morning pain is unrelenting. Pt also reports epigastric discomfort, tolerable at this time. Pt tolerating PO diet. Pt denies chest pain, SOB, nausea, vomiting, constipation and itchiness. Reports lethargy. Last BM 3/13. Patient stated goal for pain control: to be able to take deep breaths, get out of bed to chair and ambulate with tolerable pain control. Pt reports ambulating throughout the day. Pt on chronic opioids at home oxycodone 30mg q4-6h PRN.    PAST MEDICAL & SURGICAL HISTORY:  Sickle cell anemia    Gout    History of cholecystectomy        FAMILY HISTORY:  Family history of sickle cell trait (Father, Mother)        Social History:  Patient is from home, non smoker or alcoholic hx (11 Mar 2022 01:54)   [X ]Denies ETOH use and smoking   [X] + marijuana smoking     Allergies    ceftriaxone (Anaphylaxis)  hydroxyurea (Other)  penicillin (Pruritus)  piperacillin-tazobactam (Urticaria)      MEDICATIONS  (STANDING):  colchicine 0.6 milliGRAM(s) Oral daily  folic acid 1 milliGRAM(s) Oral <User Schedule>  heparin   Injectable 5000 Unit(s) SubCutaneous every 8 hours  influenza   Vaccine 0.5 milliLiter(s) IntraMuscular once  pantoprazole    Tablet 40 milliGRAM(s) Oral two times a day  senna 2 Tablet(s) Oral at bedtime  sodium chloride 0.9%. 1000 milliLiter(s) (125 mL/Hr) IV Continuous <Continuous>    MEDICATIONS  (PRN):  acetaminophen     Tablet .. 650 milliGRAM(s) Oral every 6 hours PRN Temp greater or equal to 38C (100.4F), Mild Pain (1 - 3)  HYDROmorphone  Injectable 2 milliGRAM(s) IV Push every 3 hours PRN Severe Pain (7 - 10)      Vital Signs Last 24 Hrs  T(C): 36.6 (15 Mar 2022 05:26), Max: 37.1 (14 Mar 2022 19:52)  T(F): 97.9 (15 Mar 2022 05:26), Max: 98.8 (14 Mar 2022 19:52)  HR: 77 (15 Mar 2022 05:26) (77 - 98)  BP: 155/83 (15 Mar 2022 05:26) (147/86 - 155/83)  BP(mean): --  RR: 18 (15 Mar 2022 05:26) (16 - 18)  SpO2: 92% (15 Mar 2022 05:26) (92% - 96%)  COVID-19 PCR: NotDetec (10 Mar 2022 19:07)  COVID-19 PCR: NotDetec (07 Mar 2022 07:08)  COVID-19 PCR: NotDetec (28 Feb 2022 11:17)  COVID-19 PCR: NotDetec (25 Feb 2022 00:43)  SARS-CoV-2: NotDetec (28 Oct 2021 05:25)  COVID-19 PCR: NotDetec (28 Oct 2021 03:35)  COVID-19 PCR: NotDetec (07 Oct 2021 18:02)    LABS: Reviewed                          5.5    13.31 )-----------( 317      ( 15 Mar 2022 07:01 )             15.4     03-15    141  |  112<H>  |  6<L>  ----------------------------<  85  3.5   |  23  |  0.68    Ca    8.6      15 Mar 2022 07:01  Phos  2.5     03-15  Mg     1.5     03-15    TPro  x   /  Alb  x   /  TBili  7.6<H>  /  DBili  x   /  AST  x   /  ALT  x   /  AlkPhos  x   03-15    PT/INR - ( 15 Mar 2022 07:01 )   PT: 14.2 sec;   INR: 1.19 ratio           LIVER FUNCTIONS - ( 14 Mar 2022 07:31 )  Alb: 3.0 g/dL / Pro: 6.4 g/dL / ALK PHOS: 104 U/L / ALT: 85 U/L DA / AST: 112 U/L / GGT: x             CAPILLARY BLOOD GLUCOSE        COVID-19 PCR: NotDetec (10 Mar 2022 19:07)  COVID-19 PCR: NotDetec (07 Mar 2022 07:08)  COVID-19 PCR: NotDetec (28 Feb 2022 11:17)  COVID-19 PCR: NotDetec (25 Feb 2022 00:43)  SARS-CoV-2: NotDetec (28 Oct 2021 05:25)  COVID-19 PCR: NotDetec (28 Oct 2021 03:35)  COVID-19 PCR: NotDetec (07 Oct 2021 18:02)    Radiology: Reviewed  < from: MR Abdomen w/wo IV Cont (03.14.22 @ 19:17) >  ACC: [id number] EXAM:  MR ABDOMEN WAW IC                          PROCEDURE DATE:  03/14/2022          INTERPRETATION:  CLINICAL INFORMATION: Hyperbilirubinemia. Evaluation for   biliary system. History of sickle cell disease. Multiple packed red blood   cell transfusions in the past.    COMPARISON: No prior abdominal MR is available for comparison. Reference   is made with a previous abdominal CT dated 3/10/2022    CONTRAST/COMPLICATIONS:  IV Contrast: Gadavist  8.5 cc administered   1.5 cc discarded  Oral Contrast: NONE  Complications: None reported at time of study completion    PROCEDURE:  MRI of the abdomen was performed.  MRCP was performed.    FINDINGS:  LOWER CHEST: Mild bilateral pleural effusions, right greater than left.    LIVER: The liver appears diffusely dark on T2-weighted images. In   addition, there is signal attenuation of the liver on in-phase   T1-weighted gradient echo images. Findings likely due to iron overload   from prior multiple packed red blood cell transfusions. Slight nodular   contour of the liver may represent cirrhosis. Clinical correlation is   recommended. No suspicious focal hepatic lesion is identified.    BILE DUCTS: Normal caliber. Common bile duct measures up to 3 mm in   caliber which is within normal limits. No evidence for choledocholithiasis  GALLBLADDER: Status post cholecystectomy.  SPLEEN: Atrophic and diffusely calcified, likely due to autoinfarction   from patient's known sickle cell disease.  PANCREAS: Within normal limits.  ADRENALS: Within normal limits.  KIDNEYS/URETERS: A few small bilateral renal cysts. One of the left renal   cysts is hemorrhagic or proteinaceous.    VISUALIZED PORTIONS:  BOWEL: Within normal limits.  PERITONEUM: Small ascites.  VESSELS: The portal veinsand hepatic veins are patent.  RETROPERITONEUM/LYMPH NODES: No lymphadenopathy.  ABDOMINAL WALL: Within normal limits.  BONES: Within normal limits.    IMPRESSION:  Abnormal liver signal as detailed above, likely due to iron overload from   prior multiple packed red blood cell transfusions. Slight nodular contour   of the liver may represent cirrhosis. Clinical correlation is   recommended. No suspicious focal hepatic lesion is identified.    Common bile duct measures up to 3 mm in caliber which is within normal   limits. No evidence for choledocholithiasis.    Small ascites.    Mild bilateral pleural effusions.    --- End of Report ---            RONNY ORTIZ MD; Attending Radiologist  This document has been electronically signed. Mar 15 2022  9:47AM    < end of copied text >      < from: Xray Chest 1 View-PORTABLE IMMEDIATE (Xray Chest 1 View-PORTABLE IMMEDIATE .) (03.10.22 @ 22:08) >    ACC: [id number] EXAM:  XR CHEST PORTABLE IMMED 1V                          PROCEDURE DATE:  03/10/2022          INTERPRETATION:  Portable chest radiograph    CLINICAL INFORMATION: Bilious vomiting.    TECHNIQUE:  Portable  AP chest radiograph.    COMPARISON: 10/28/2021 .    FINDINGS:  CATHETERS AND TUBES: Mediport catheter tip in SVC.    PULMONARY: The visualized lungs are clear of airspace consolidations or   effusions.   No pneumothorax.    HEART/VASCULAR: The  heart is enlarged in transverse diameter. .    BONES: Visualized osseous structures are intact.    IMPRESSION:   No radiographic evidence of active chest disease.    --- End of Report ---            RAMO MORALES MD; Attending Radiologist  This document has been electronically signed. Mar 11 2022  1:24PM    < end of copied text >    < from: CT Abdomen and Pelvis w/ IV Cont (03.10.22 @ 20:17) >    ACC: [id number] EXAM:  CT ABDOMEN AND PELVIS IC                          PROCEDURE DATE:  03/10/2022          INTERPRETATION:  CLINICAL INFORMATION: Right upper quadrant pain and   vomiting    COMPARISON: CT abdomen pelvis 3/4/2022    CONTRAST/COMPLICATIONS:  IV Contrast: Omnipaque 350  90 cc administered   10 cc discarded  Oral Contrast: NONE  Complications: None reported at time of study completion    PROCEDURE:  CT of the Abdomen and Pelvis was performed.  Sagittal and coronal reformats were performed.    FINDINGS:  LOWER CHEST: Cardiomegaly. Bibasilar linear type atelectasis.    LIVER: Prominent hepatorenal notching and hypertrophy lateral segment of   the left hepatic lobe. Findings can be seen with cirrhosis. Periportal   edema.  BILE DUCTS: Normal caliber.  GALLBLADDER: Cholecystectomy.  SPLEEN: Jason infarcted calcified spleen.  PANCREAS: Within normal limits.  ADRENALS: Within normal limits.  KIDNEYS/URETERS: Left renal cyst. Symmetric enhancement. Partial left   renal duplication. No hydronephrosis.    BLADDER: Within normal limits.  REPRODUCTIVE ORGANS: Prostate within normal limits.    BOWEL: No bowel obstruction. Appendix is normal.  PERITONEUM: No ascites.  VESSELS: Within normal limits.  RETROPERITONEUM/LYMPH NODES: Stable enlarged upper abdominal lymph nodes   in the gastrohepatic, periportal, and portacaval regions, nonspecific.  ABDOMINAL WALL: Small fat-containing left inguinal hernia.  BONES: Stable moderate T12 and mild to moderate L1 compression   deformities..    IMPRESSION:  No acute abdominal pathology.    Hepatic morphology is nonspecific but can be seen with underlying   cirrhosis.    Enlarged upper abdominal lymph nodes are nonspecific and can be seen in   the setting of portal hypertension.        --- End of Report ---            DIAZ URIOSTEGUI MD; Attending Radiologist  This document has been electronically signed. Mar 10 2022  8:29PM    < end of copied text >      ORT Score -   Family Hx of substance abuse	Female	      Male  Alcohol 	                                           1                     3  Illegal drugs	                                   2                     3  Rx drugs                                           4 	                  4  Personal Hx of substance abuse		  Alcohol 	                                          3	                  3  Illegal drugs                                     4	                  4  Rx drugs                                            5 	                  5  Age between 16- 45 years	           1                     1  hx preadolescent sexual abuse	   3 	                  0  Psychological disease		  ADD, OCD, bipolar, schizophrenia   2	          2  Depression                                           1 	          1  Total: 5    a score of 3 or lower indicates low risk for opioid abuse		  a score of 4-7 indicates moderate risk for opioid abuse		  a score of 8 or higher indicates high risk for opioid abuse    REVIEW OF SYSTEMS:  CONSTITUTIONAL: No fever + fatigue  RESPIRATORY: No cough, wheezing, chills or hemoptysis; No shortness of breath  CARDIOVASCULAR: No chest pain, palpitations, dizziness, or leg swelling  GASTROINTESTINAL: No loss of appetite, decreased PO intake. + epigastric pain. No nausea, vomiting; No diarrhea or constipation.   GENITOURINARY: No dysuria, frequency, hematuria, retention or incontinence  MUSCULOSKELETAL: + generalized body pain, + back pain, left hip pain, no swelling; no upper or lower motor strength weakness, no saddle anesthesia, bowel/bladder incontinence, no falls   NEURO: No headaches, No numbness/tingling b/l LE, No weakness    PHYSICAL EXAM:  GENERAL:  Faigued & Oriented X4, cooperative, NAD, Speech is clear.   RESPIRATORY: Respirations even and unlabored. Clear to auscultation bilaterally; No rales, rhonchi, wheezing, or rubs  CARDIOVASCULAR: + right chest wall mediport c/d/i; Normal S1/S2, regular rate and rhythm; No murmurs, rubs, or gallops. No JVD.   GASTROINTESTINAL:  Soft, + epigastric tenderness, Nondistended; Bowel sounds present  PERIPHERAL VASCULAR:  Extremities warm without edema. 2+ Peripheral Pulses, No cyanosis, No calf tenderness  MUSCULOSKELETAL: Motor Strength 5/5 B/L upper and lower extremities; moves all extremities equally against gravity; ROM intact; negative SLR; + lumbar back tenderness  SKIN: + icterus + generalized jaundice; + multiple tattoos site c/d/i without erythema, edema. Warm, dry, intact. No rashes, lesions, scars or wounds.     Risk factors associated with adverse outcomes related to opioid treatment  [ ]  Concurrent benzodiazepine use  [X ]  History/ Active substance use or alcohol use disorder  [ ] Psychiatric co-morbidity  [ ] Sleep apnea  [ ] COPD  [ ] BMI> 35  [X ] Liver dysfunction  [ ] Renal dysfunction  [ ] CHF  [X ] Smoker- marijuana   [ ]  Age > 60 years    [X ]  [facility]  Reviewed and Copied to Chart. See below.    Plan of care and goal oriented pain management treatment options were discussed with patient and /or primary care giver; all questions and concerns were addressed and care was aligned with patient's wishes.    Educated patient on goal oriented pain management treatment options     03-15-22 @ 10:13         Source of information: TOLU VARGAS, Chart review  Patient language: English  : n/a    HPI:  Patient is a 40 y/o with gout and SCD recently hospitalized 2/24-2/26, 2/28-3/7 for pain crisis presenting with 5 hours of new pain, all over, primarily in abdomen and bilious vomiting. Patient reports he was feeling okay since discharge a few days ago until today at noon when he began having pain and took oxy. The pain progressed and he began vomiting and having diarrhea. Pt reports that he feels cold. Pt reports pain all over his body. (11 Mar 2022 01:54)    Pt diagnosed with sickle cell crisis. Retic percent 15.9. Bilirubin 7.9. CTAP demonstrates Hepatic morphology is nonspecific but can be seen with underlying   cirrhosis. Enlarged upper abdominal lymph nodes are nonspecific and can be seen in the setting of portal hypertension.  MRI abdomen 3/14 demonstrates- Abnormal liver signal as detailed above, likely due to iron overload from prior multiple packed red blood cell transfusions. Slight nodular contour of the liver may represent cirrhosis. Clinical correlation is recommended. No suspicious focal hepatic lesion is identified. Common bile duct measures up to 3 mm in caliber which is within normal   limits. No evidence for choledocholithiasis. Small ascites. Mild bilateral pleural effusions.    Pain consulted for sickle cell pain. Pt seen and examined at bedside. Pt reports back, pain greatest over low back. Pt describes pain as aching, intermittently sharp, non- radiating, not alleviated by current pain medications, exacerbated by movement. Pt received dilaudid 2mg IV 1 hour prior to encounter. States overnight pain was well controlled with prn pain meds, however this morning pain is unrelenting. Pt also reports epigastric discomfort, tolerable at this time. Pt tolerating PO diet. Pt denies chest pain, SOB, nausea, vomiting, constipation and itchiness. Reports lethargy and dark urine. Last BM 3/13. Patient stated goal for pain control: to be able to take deep breaths, get out of bed to chair and ambulate with tolerable pain control. Pt reports ambulating throughout the day. Pt on chronic opioids at home oxycodone 30mg q4-6h PRN.    PAST MEDICAL & SURGICAL HISTORY:  Sickle cell anemia    Gout    History of cholecystectomy        FAMILY HISTORY:  Family history of sickle cell trait (Father, Mother)        Social History:  Patient is from home, non smoker or alcoholic hx (11 Mar 2022 01:54)   [X ]Denies ETOH use and smoking   [X] + marijuana smoking     Allergies    ceftriaxone (Anaphylaxis)  hydroxyurea (Other)  penicillin (Pruritus)  piperacillin-tazobactam (Urticaria)      MEDICATIONS  (STANDING):  colchicine 0.6 milliGRAM(s) Oral daily  folic acid 1 milliGRAM(s) Oral <User Schedule>  heparin   Injectable 5000 Unit(s) SubCutaneous every 8 hours  influenza   Vaccine 0.5 milliLiter(s) IntraMuscular once  pantoprazole    Tablet 40 milliGRAM(s) Oral two times a day  senna 2 Tablet(s) Oral at bedtime  sodium chloride 0.9%. 1000 milliLiter(s) (125 mL/Hr) IV Continuous <Continuous>    MEDICATIONS  (PRN):  acetaminophen     Tablet .. 650 milliGRAM(s) Oral every 6 hours PRN Temp greater or equal to 38C (100.4F), Mild Pain (1 - 3)  HYDROmorphone  Injectable 2 milliGRAM(s) IV Push every 3 hours PRN Severe Pain (7 - 10)      Vital Signs Last 24 Hrs  T(C): 36.6 (15 Mar 2022 05:26), Max: 37.1 (14 Mar 2022 19:52)  T(F): 97.9 (15 Mar 2022 05:26), Max: 98.8 (14 Mar 2022 19:52)  HR: 77 (15 Mar 2022 05:26) (77 - 98)  BP: 155/83 (15 Mar 2022 05:26) (147/86 - 155/83)  BP(mean): --  RR: 18 (15 Mar 2022 05:26) (16 - 18)  SpO2: 92% (15 Mar 2022 05:26) (92% - 96%)  COVID-19 PCR: NotDetec (10 Mar 2022 19:07)  COVID-19 PCR: NotDetec (07 Mar 2022 07:08)  COVID-19 PCR: NotDetec (28 Feb 2022 11:17)  COVID-19 PCR: NotDetec (25 Feb 2022 00:43)  SARS-CoV-2: NotDetec (28 Oct 2021 05:25)  COVID-19 PCR: NotDetec (28 Oct 2021 03:35)  COVID-19 PCR: NotDetec (07 Oct 2021 18:02)    LABS: Reviewed                          5.5    13.31 )-----------( 317      ( 15 Mar 2022 07:01 )             15.4     03-15    141  |  112<H>  |  6<L>  ----------------------------<  85  3.5   |  23  |  0.68    Ca    8.6      15 Mar 2022 07:01  Phos  2.5     03-15  Mg     1.5     03-15    TPro  x   /  Alb  x   /  TBili  7.6<H>  /  DBili  x   /  AST  x   /  ALT  x   /  AlkPhos  x   03-15    PT/INR - ( 15 Mar 2022 07:01 )   PT: 14.2 sec;   INR: 1.19 ratio           LIVER FUNCTIONS - ( 14 Mar 2022 07:31 )  Alb: 3.0 g/dL / Pro: 6.4 g/dL / ALK PHOS: 104 U/L / ALT: 85 U/L DA / AST: 112 U/L / GGT: x             CAPILLARY BLOOD GLUCOSE        COVID-19 PCR: NotDetec (10 Mar 2022 19:07)  COVID-19 PCR: NotDetec (07 Mar 2022 07:08)  COVID-19 PCR: NotDetec (28 Feb 2022 11:17)  COVID-19 PCR: NotDetec (25 Feb 2022 00:43)  SARS-CoV-2: NotDetec (28 Oct 2021 05:25)  COVID-19 PCR: NotDetec (28 Oct 2021 03:35)  COVID-19 PCR: NotDetec (07 Oct 2021 18:02)    Radiology: Reviewed  < from: MR Abdomen w/wo IV Cont (03.14.22 @ 19:17) >  ACC: [id number] EXAM:  MR ABDOMEN WAW IC                          PROCEDURE DATE:  03/14/2022          INTERPRETATION:  CLINICAL INFORMATION: Hyperbilirubinemia. Evaluation for   biliary system. History of sickle cell disease. Multiple packed red blood   cell transfusions in the past.    COMPARISON: No prior abdominal MR is available for comparison. Reference   is made with a previous abdominal CT dated 3/10/2022    CONTRAST/COMPLICATIONS:  IV Contrast: Gadavist  8.5 cc administered   1.5 cc discarded  Oral Contrast: NONE  Complications: None reported at time of study completion    PROCEDURE:  MRI of the abdomen was performed.  MRCP was performed.    FINDINGS:  LOWER CHEST: Mild bilateral pleural effusions, right greater than left.    LIVER: The liver appears diffusely dark on T2-weighted images. In   addition, there is signal attenuation of the liver on in-phase   T1-weighted gradient echo images. Findings likely due to iron overload   from prior multiple packed red blood cell transfusions. Slight nodular   contour of the liver may represent cirrhosis. Clinical correlation is   recommended. No suspicious focal hepatic lesion is identified.    BILE DUCTS: Normal caliber. Common bile duct measures up to 3 mm in   caliber which is within normal limits. No evidence for choledocholithiasis  GALLBLADDER: Status post cholecystectomy.  SPLEEN: Atrophic and diffusely calcified, likely due to autoinfarction   from patient's known sickle cell disease.  PANCREAS: Within normal limits.  ADRENALS: Within normal limits.  KIDNEYS/URETERS: A few small bilateral renal cysts. One of the left renal   cysts is hemorrhagic or proteinaceous.    VISUALIZED PORTIONS:  BOWEL: Within normal limits.  PERITONEUM: Small ascites.  VESSELS: The portal veinsand hepatic veins are patent.  RETROPERITONEUM/LYMPH NODES: No lymphadenopathy.  ABDOMINAL WALL: Within normal limits.  BONES: Within normal limits.    IMPRESSION:  Abnormal liver signal as detailed above, likely due to iron overload from   prior multiple packed red blood cell transfusions. Slight nodular contour   of the liver may represent cirrhosis. Clinical correlation is   recommended. No suspicious focal hepatic lesion is identified.    Common bile duct measures up to 3 mm in caliber which is within normal   limits. No evidence for choledocholithiasis.    Small ascites.    Mild bilateral pleural effusions.    --- End of Report ---            RONNY ORTIZ MD; Attending Radiologist  This document has been electronically signed. Mar 15 2022  9:47AM    < end of copied text >      < from: Xray Chest 1 View-PORTABLE IMMEDIATE (Xray Chest 1 View-PORTABLE IMMEDIATE .) (03.10.22 @ 22:08) >    ACC: [id number] EXAM:  XR CHEST PORTABLE IMMED 1V                          PROCEDURE DATE:  03/10/2022          INTERPRETATION:  Portable chest radiograph    CLINICAL INFORMATION: Bilious vomiting.    TECHNIQUE:  Portable  AP chest radiograph.    COMPARISON: 10/28/2021 .    FINDINGS:  CATHETERS AND TUBES: Mediport catheter tip in SVC.    PULMONARY: The visualized lungs are clear of airspace consolidations or   effusions.   No pneumothorax.    HEART/VASCULAR: The  heart is enlarged in transverse diameter. .    BONES: Visualized osseous structures are intact.    IMPRESSION:   No radiographic evidence of active chest disease.    --- End of Report ---            RAMO MORALES MD; Attending Radiologist  This document has been electronically signed. Mar 11 2022  1:24PM    < end of copied text >    < from: CT Abdomen and Pelvis w/ IV Cont (03.10.22 @ 20:17) >    ACC: [id number] EXAM:  CT ABDOMEN AND PELVIS IC                          PROCEDURE DATE:  03/10/2022          INTERPRETATION:  CLINICAL INFORMATION: Right upper quadrant pain and   vomiting    COMPARISON: CT abdomen pelvis 3/4/2022    CONTRAST/COMPLICATIONS:  IV Contrast: Omnipaque 350  90 cc administered   10 cc discarded  Oral Contrast: NONE  Complications: None reported at time of study completion    PROCEDURE:  CT of the Abdomen and Pelvis was performed.  Sagittal and coronal reformats were performed.    FINDINGS:  LOWER CHEST: Cardiomegaly. Bibasilar linear type atelectasis.    LIVER: Prominent hepatorenal notching and hypertrophy lateral segment of   the left hepatic lobe. Findings can be seen with cirrhosis. Periportal   edema.  BILE DUCTS: Normal caliber.  GALLBLADDER: Cholecystectomy.  SPLEEN: Jason infarcted calcified spleen.  PANCREAS: Within normal limits.  ADRENALS: Within normal limits.  KIDNEYS/URETERS: Left renal cyst. Symmetric enhancement. Partial left   renal duplication. No hydronephrosis.    BLADDER: Within normal limits.  REPRODUCTIVE ORGANS: Prostate within normal limits.    BOWEL: No bowel obstruction. Appendix is normal.  PERITONEUM: No ascites.  VESSELS: Within normal limits.  RETROPERITONEUM/LYMPH NODES: Stable enlarged upper abdominal lymph nodes   in the gastrohepatic, periportal, and portacaval regions, nonspecific.  ABDOMINAL WALL: Small fat-containing left inguinal hernia.  BONES: Stable moderate T12 and mild to moderate L1 compression   deformities..    IMPRESSION:  No acute abdominal pathology.    Hepatic morphology is nonspecific but can be seen with underlying   cirrhosis.    Enlarged upper abdominal lymph nodes are nonspecific and can be seen in   the setting of portal hypertension.        --- End of Report ---            DIAZ URIOSTEGUI MD; Attending Radiologist  This document has been electronically signed. Mar 10 2022  8:29PM    < end of copied text >      ORT Score -   Family Hx of substance abuse	Female	      Male  Alcohol 	                                           1                     3  Illegal drugs	                                   2                     3  Rx drugs                                           4 	                  4  Personal Hx of substance abuse		  Alcohol 	                                          3	                  3  Illegal drugs                                     4	                  4  Rx drugs                                            5 	                  5  Age between 16- 45 years	           1                     1  hx preadolescent sexual abuse	   3 	                  0  Psychological disease		  ADD, OCD, bipolar, schizophrenia   2	          2  Depression                                           1 	          1  Total: 5    a score of 3 or lower indicates low risk for opioid abuse		  a score of 4-7 indicates moderate risk for opioid abuse		  a score of 8 or higher indicates high risk for opioid abuse    REVIEW OF SYSTEMS:  CONSTITUTIONAL: No fever + fatigue  RESPIRATORY: No cough, wheezing, chills or hemoptysis; No shortness of breath  CARDIOVASCULAR: No chest pain, palpitations, dizziness, or leg swelling  GASTROINTESTINAL: No loss of appetite, decreased PO intake. + epigastric pain. No nausea, vomiting; No diarrhea or constipation.   GENITOURINARY: No dysuria, frequency, hematuria, retention or incontinence +dark urine  MUSCULOSKELETAL: + generalized body pain, + back pain, left hip pain, no swelling; no upper or lower motor strength weakness, no saddle anesthesia, bowel/bladder incontinence, no falls   NEURO: No headaches, No numbness/tingling b/l LE, No weakness    PHYSICAL EXAM:  GENERAL:  Faigued & Oriented X4, cooperative, NAD, Speech is clear.   RESPIRATORY: Respirations even and unlabored. Clear to auscultation bilaterally; No rales, rhonchi, wheezing, or rubs  CARDIOVASCULAR: + right chest wall mediport c/d/i; Normal S1/S2, regular rate and rhythm; No murmurs, rubs, or gallops. No JVD.   GASTROINTESTINAL:  Soft, + epigastric tenderness, Nondistended; Bowel sounds present  PERIPHERAL VASCULAR:  Extremities warm without edema. 2+ Peripheral Pulses, No cyanosis, No calf tenderness  MUSCULOSKELETAL: Motor Strength 5/5 B/L upper and lower extremities; moves all extremities equally against gravity; ROM intact; negative SLR; + lumbar back tenderness  SKIN: + icterus + generalized jaundice; + multiple tattoos site c/d/i without erythema, edema. Warm, dry, intact. No rashes, lesions, scars or wounds.     Risk factors associated with adverse outcomes related to opioid treatment  [ ]  Concurrent benzodiazepine use  [X ]  History/ Active substance use or alcohol use disorder  [ ] Psychiatric co-morbidity  [ ] Sleep apnea  [ ] COPD  [ ] BMI> 35  [X ] Liver dysfunction  [ ] Renal dysfunction  [ ] CHF  [X ] Smoker- marijuana   [ ]  Age > 60 years    [X ]  MARIAH  Reviewed and Copied to Chart. See below.    Plan of care and goal oriented pain management treatment options were discussed with patient and /or primary care giver; all questions and concerns were addressed and care was aligned with patient's wishes.    Educated patient on goal oriented pain management treatment options     03-15-22 @ 10:13

## 2022-03-15 NOTE — PROGRESS NOTE ADULT - PROBLEM SELECTOR PLAN 1
- CT A/P showed no acute abdominal pathology. Hepatic morphology is nonspecific but can be seen with underlying cirrhosis. Enlarged upper abdominal lymph nodes are nonspecific and can be seen in the setting of portal hypertension. Ddx includes gastritis, PUD.   - continue PPI to BID empirically   - Advance diet as tolerated  - MRI/MRCP: Abnormal liver signal as detailed above, likely due to iron overload from prior multiple packed red blood cell transfusions. Slight nodular contour of the liver may represent cirrhosis. CBD measures up to 3 mm. Small ascites.  - noted to have some perigastric adenopathy on CT, warrants EGD as outpatient

## 2022-03-16 ENCOUNTER — TRANSCRIPTION ENCOUNTER (OUTPATIENT)
Age: 42
End: 2022-03-16

## 2022-03-16 LAB
ALBUMIN SERPL ELPH-MCNC: 3 G/DL — LOW (ref 3.5–5)
ALP SERPL-CCNC: 102 U/L — SIGNIFICANT CHANGE UP (ref 40–120)
ALT FLD-CCNC: 80 U/L DA — HIGH (ref 10–60)
ANION GAP SERPL CALC-SCNC: 5 MMOL/L — SIGNIFICANT CHANGE UP (ref 5–17)
ANISOCYTOSIS BLD QL: SLIGHT — SIGNIFICANT CHANGE UP
AST SERPL-CCNC: 106 U/L — HIGH (ref 10–40)
BILIRUB DIRECT SERPL-MCNC: 3.6 MG/DL — HIGH (ref 0–0.3)
BILIRUB INDIRECT FLD-MCNC: 3.3 MG/DL — HIGH (ref 0.2–1)
BILIRUB SERPL-MCNC: 6.9 MG/DL — HIGH (ref 0.2–1.2)
BUN SERPL-MCNC: 6 MG/DL — LOW (ref 7–18)
CALCIUM SERPL-MCNC: 8.3 MG/DL — LOW (ref 8.4–10.5)
CHLORIDE SERPL-SCNC: 113 MMOL/L — HIGH (ref 96–108)
CO2 SERPL-SCNC: 24 MMOL/L — SIGNIFICANT CHANGE UP (ref 22–31)
CREAT SERPL-MCNC: 0.68 MG/DL — SIGNIFICANT CHANGE UP (ref 0.5–1.3)
CULTURE RESULTS: SIGNIFICANT CHANGE UP
CULTURE RESULTS: SIGNIFICANT CHANGE UP
EGFR: 119 ML/MIN/1.73M2 — SIGNIFICANT CHANGE UP
ELLIPTOCYTES BLD QL SMEAR: SLIGHT — SIGNIFICANT CHANGE UP
GLUCOSE SERPL-MCNC: 93 MG/DL — SIGNIFICANT CHANGE UP (ref 70–99)
HCT VFR BLD CALC: 19.1 % — CRITICAL LOW (ref 39–50)
HGB BLD-MCNC: 6.7 G/DL — CRITICAL LOW (ref 13–17)
MAGNESIUM SERPL-MCNC: 1.5 MG/DL — LOW (ref 1.6–2.6)
MANUAL SMEAR VERIFICATION: SIGNIFICANT CHANGE UP
MCHC RBC-ENTMCNC: 31.5 PG — SIGNIFICANT CHANGE UP (ref 27–34)
MCHC RBC-ENTMCNC: 35.1 GM/DL — SIGNIFICANT CHANGE UP (ref 32–36)
MCV RBC AUTO: 89.7 FL — SIGNIFICANT CHANGE UP (ref 80–100)
NRBC # BLD: 1 /100 WBCS — HIGH (ref 0–0)
PHOSPHATE SERPL-MCNC: 2.9 MG/DL — SIGNIFICANT CHANGE UP (ref 2.5–4.5)
PLAT MORPH BLD: NORMAL — SIGNIFICANT CHANGE UP
PLATELET # BLD AUTO: 320 K/UL — SIGNIFICANT CHANGE UP (ref 150–400)
POIKILOCYTOSIS BLD QL AUTO: SLIGHT — SIGNIFICANT CHANGE UP
POLYCHROMASIA BLD QL SMEAR: SLIGHT — SIGNIFICANT CHANGE UP
POTASSIUM SERPL-MCNC: 3.5 MMOL/L — SIGNIFICANT CHANGE UP (ref 3.5–5.3)
POTASSIUM SERPL-SCNC: 3.5 MMOL/L — SIGNIFICANT CHANGE UP (ref 3.5–5.3)
PROT SERPL-MCNC: 6.5 G/DL — SIGNIFICANT CHANGE UP (ref 6–8.3)
RBC # BLD: 2.13 M/UL — LOW (ref 4.2–5.8)
RBC # FLD: 20.8 % — HIGH (ref 10.3–14.5)
RBC BLD AUTO: ABNORMAL
SICKLE CELLS BLD QL SMEAR: SLIGHT — SIGNIFICANT CHANGE UP
SODIUM SERPL-SCNC: 142 MMOL/L — SIGNIFICANT CHANGE UP (ref 135–145)
SPECIMEN SOURCE: SIGNIFICANT CHANGE UP
SPECIMEN SOURCE: SIGNIFICANT CHANGE UP
WBC # BLD: 14.88 K/UL — HIGH (ref 3.8–10.5)
WBC # FLD AUTO: 14.88 K/UL — HIGH (ref 3.8–10.5)

## 2022-03-16 PROCEDURE — 99232 SBSQ HOSP IP/OBS MODERATE 35: CPT

## 2022-03-16 PROCEDURE — 99233 SBSQ HOSP IP/OBS HIGH 50: CPT | Mod: GC

## 2022-03-16 RX ORDER — HYDROMORPHONE HYDROCHLORIDE 2 MG/ML
2 INJECTION INTRAMUSCULAR; INTRAVENOUS; SUBCUTANEOUS
Refills: 0 | Status: DISCONTINUED | OUTPATIENT
Start: 2022-03-16 | End: 2022-03-17

## 2022-03-16 RX ORDER — HYDROMORPHONE HYDROCHLORIDE 2 MG/ML
3 INJECTION INTRAMUSCULAR; INTRAVENOUS; SUBCUTANEOUS
Refills: 0 | Status: DISCONTINUED | OUTPATIENT
Start: 2022-03-16 | End: 2022-03-16

## 2022-03-16 RX ADMIN — HEPARIN SODIUM 5000 UNIT(S): 5000 INJECTION INTRAVENOUS; SUBCUTANEOUS at 06:27

## 2022-03-16 RX ADMIN — HYDROMORPHONE HYDROCHLORIDE 3 MILLIGRAM(S): 2 INJECTION INTRAMUSCULAR; INTRAVENOUS; SUBCUTANEOUS at 09:41

## 2022-03-16 RX ADMIN — SODIUM CHLORIDE 125 MILLILITER(S): 9 INJECTION INTRAMUSCULAR; INTRAVENOUS; SUBCUTANEOUS at 00:18

## 2022-03-16 RX ADMIN — SENNA PLUS 2 TABLET(S): 8.6 TABLET ORAL at 21:08

## 2022-03-16 RX ADMIN — HYDROMORPHONE HYDROCHLORIDE 3 MILLIGRAM(S): 2 INJECTION INTRAMUSCULAR; INTRAVENOUS; SUBCUTANEOUS at 16:43

## 2022-03-16 RX ADMIN — PANTOPRAZOLE SODIUM 40 MILLIGRAM(S): 20 TABLET, DELAYED RELEASE ORAL at 06:27

## 2022-03-16 RX ADMIN — HYDROMORPHONE HYDROCHLORIDE 3 MILLIGRAM(S): 2 INJECTION INTRAMUSCULAR; INTRAVENOUS; SUBCUTANEOUS at 10:06

## 2022-03-16 RX ADMIN — HYDROMORPHONE HYDROCHLORIDE 3 MILLIGRAM(S): 2 INJECTION INTRAMUSCULAR; INTRAVENOUS; SUBCUTANEOUS at 00:19

## 2022-03-16 RX ADMIN — HEPARIN SODIUM 5000 UNIT(S): 5000 INJECTION INTRAVENOUS; SUBCUTANEOUS at 21:09

## 2022-03-16 RX ADMIN — HEPARIN SODIUM 5000 UNIT(S): 5000 INJECTION INTRAVENOUS; SUBCUTANEOUS at 13:42

## 2022-03-16 RX ADMIN — HYDROMORPHONE HYDROCHLORIDE 3 MILLIGRAM(S): 2 INJECTION INTRAMUSCULAR; INTRAVENOUS; SUBCUTANEOUS at 12:59

## 2022-03-16 RX ADMIN — Medication 102 MILLIGRAM(S): at 00:04

## 2022-03-16 RX ADMIN — SODIUM CHLORIDE 125 MILLILITER(S): 9 INJECTION INTRAMUSCULAR; INTRAVENOUS; SUBCUTANEOUS at 21:10

## 2022-03-16 RX ADMIN — Medication 1 MILLIGRAM(S): at 18:04

## 2022-03-16 RX ADMIN — HYDROMORPHONE HYDROCHLORIDE 3 MILLIGRAM(S): 2 INJECTION INTRAMUSCULAR; INTRAVENOUS; SUBCUTANEOUS at 03:39

## 2022-03-16 RX ADMIN — HYDROMORPHONE HYDROCHLORIDE 3 MILLIGRAM(S): 2 INJECTION INTRAMUSCULAR; INTRAVENOUS; SUBCUTANEOUS at 13:39

## 2022-03-16 RX ADMIN — HYDROMORPHONE HYDROCHLORIDE 3 MILLIGRAM(S): 2 INJECTION INTRAMUSCULAR; INTRAVENOUS; SUBCUTANEOUS at 06:27

## 2022-03-16 RX ADMIN — HYDROMORPHONE HYDROCHLORIDE 3 MILLIGRAM(S): 2 INJECTION INTRAMUSCULAR; INTRAVENOUS; SUBCUTANEOUS at 00:04

## 2022-03-16 RX ADMIN — Medication 1 MILLIGRAM(S): at 08:31

## 2022-03-16 RX ADMIN — Medication 0.6 MILLIGRAM(S): at 11:38

## 2022-03-16 RX ADMIN — HYDROMORPHONE HYDROCHLORIDE 3 MILLIGRAM(S): 2 INJECTION INTRAMUSCULAR; INTRAVENOUS; SUBCUTANEOUS at 20:15

## 2022-03-16 RX ADMIN — HYDROMORPHONE HYDROCHLORIDE 3 MILLIGRAM(S): 2 INJECTION INTRAMUSCULAR; INTRAVENOUS; SUBCUTANEOUS at 19:32

## 2022-03-16 RX ADMIN — HYDROMORPHONE HYDROCHLORIDE 3 MILLIGRAM(S): 2 INJECTION INTRAMUSCULAR; INTRAVENOUS; SUBCUTANEOUS at 06:42

## 2022-03-16 RX ADMIN — PANTOPRAZOLE SODIUM 40 MILLIGRAM(S): 20 TABLET, DELAYED RELEASE ORAL at 18:04

## 2022-03-16 RX ADMIN — HYDROMORPHONE HYDROCHLORIDE 2 MILLIGRAM(S): 2 INJECTION INTRAMUSCULAR; INTRAVENOUS; SUBCUTANEOUS at 23:32

## 2022-03-16 RX ADMIN — HYDROMORPHONE HYDROCHLORIDE 3 MILLIGRAM(S): 2 INJECTION INTRAMUSCULAR; INTRAVENOUS; SUBCUTANEOUS at 16:29

## 2022-03-16 RX ADMIN — HYDROMORPHONE HYDROCHLORIDE 3 MILLIGRAM(S): 2 INJECTION INTRAMUSCULAR; INTRAVENOUS; SUBCUTANEOUS at 03:54

## 2022-03-16 NOTE — PROGRESS NOTE ADULT - PROBLEM SELECTOR PLAN 1
H/H stable Patient with known sickle cell disease. Baseline hemoglobin around 5 to 6.   On Hydromorphone IV  Pain management consulted  Heme/onc Dr. Jordan following   c/w IV fluids  Received 1 unit of PRBC overnight.]  Placed on Dilaudid 2mg q3hr standing on 3/16

## 2022-03-16 NOTE — DISCHARGE NOTE PROVIDER - DISCHARGE SERVICE FOR PATIENT
No
on the discharge service for the patient. I have reviewed and made amendments to the documentation where necessary.

## 2022-03-16 NOTE — DIETITIAN INITIAL EVALUATION ADULT. - PROBLEM SELECTOR PLAN 3
- WBCs 14K, Low BP, Fever >101  - Could be due to Pyelonephritis??  - Will start IV antibiotics   - Blood culture sent   - Will monitor vitals   - Tylenol PRN for pain

## 2022-03-16 NOTE — PROGRESS NOTE ADULT - PROBLEM SELECTOR PLAN 4
c/w IV fluids  PPI, Carafate  Heme/Onc  Dr. Jordan following  C.Diff and stool cultures ordered   CT abd/pelvis images reviewed, no acute surgical pathology  Bcx NGTD  GI Consulted
c/w IV fluids  PPI, Carafate  Heme/Onc  Dr. Jordan following  C.Diff and stool cultures ordered   CT abd/pelvis images reviewed, no acute surgical pathology  Bcx NGTD  f/u Ucx   GI Consulted
Patient with known sickle cell disease. Baseline hemoglobin around 5 to 6.   - Would hold further transfusions at this time unless he has symptomatic anemia
c/w IV fluids  PPI, Carafate  Heme/Onc  Dr. Jordan following  C.Diff and stool cultures ordered   CT abd/pelvis images reviewed, no acute surgical pathology  Bcx NGTD  GI Consulted

## 2022-03-16 NOTE — PROGRESS NOTE ADULT - ASSESSMENT
42 y/o with gout and SCD recently hospitalized 2/24-2/26, 2/28-3/7 for pain crisis presenting with 5 hours of new pain, all over, primarily in abdomen and bilious vomiting. Patient reports he was feeling okay since discharge a few days ago until today at noon when he began having pain and took oxy. The pain progressed and he began vomiting and having diarrhea. Pt reports that he feels cold. Pt reports pain all over his body. (11 Mar 2022 01:54) now the pain is mainly at epigastric area like last admission.  He smokes pot.  Hed has fever 100.4 in ER.  HemeOnc Dr. Jordan, Surgery Dr. De La O, [facility] and Pain management was consulted. Repeat CT scan to r/o appendicitis was negative and surgical intervention was not warranted.

## 2022-03-16 NOTE — PROGRESS NOTE ADULT - PROBLEM SELECTOR PLAN 6
Patient with known sickle cell disease. Baseline hemoglobin around 5 to 6.   - Would hold further transfusions at this time unless he has symptomatic anemia
Patient with known sickle cell disease. Baseline hemoglobin around 5 to 6.   - Would hold further transfusions at this time unless he has symptomatic anemia
Patient with known sickle cell disease. Baseline hemoglobin around 5 to 6.   Received 1U PRBC on 3/16

## 2022-03-16 NOTE — DISCHARGE NOTE PROVIDER - NSDCMRMEDTOKEN_GEN_ALL_CORE_FT
allopurinol 100 mg oral tablet: orally once a day  colchicine 0.6 mg oral tablet: 1 tab(s) orally once a day  folic acid 1 mg oral tablet: 1 tab(s) orally 2 times a day  oxyCODONE 30 mg oral tablet: 1 tab(s) orally every 4 hours, As needed, Severe Pain (7 - 10)  senna oral tablet: 2 tab(s) orally once a day (at bedtime)

## 2022-03-16 NOTE — PROGRESS NOTE ADULT - PROBLEM SELECTOR PLAN 1
Pt with generalized body pain, back, left hip and epigastric pain which is somatic  in nature due to sickle cell crisis and gastritis. Pt on chronic opioids at home oxycodone 30mg q4-6h PRN. Retic percent 15.9. Bilirubin 7.9. CTAP demonstrates Hepatic morphology is nonspecific but can be seen with underlying cirrhosis. Enlarged upper abdominal lymph nodes are nonspecific and can be seen in the setting of portal hypertension. MRI abdomen demonstrates likely cirrhosis, small ascites, b/l pleural effusions. S/p 1 unit PRBC 3/16 AM.   Opioid pain recommendations    - Continue dilaudid 3mg IV q3hr standing per heme/onc. Monitor for sedation/ respiratory depression.   Non-opioid pain recommendations   - Pt with cirrhosis and transaminitis- caution use of acetaminophen  - Avoid nsaids- gastritis   Bowel Regimen  - Continue Miralax 17G PO daily  Mild pain   - Non-pharmacological pain treatment recommendations  - Warm/ Cool packs PRN   - Repositioning, imagery, relaxation, distraction.  - OOB if no contraindications   Continue IVF per primary team.   Recommendations discussed with primary team and RN

## 2022-03-16 NOTE — DISCHARGE NOTE PROVIDER - NSDCCPCAREPLAN_GEN_ALL_CORE_FT
PRINCIPAL DISCHARGE DIAGNOSIS  Diagnosis: Sickle cell crisis  Assessment and Plan of Treatment:       SECONDARY DISCHARGE DIAGNOSES  Diagnosis: Cyclical vomiting syndrome  Assessment and Plan of Treatment:     Diagnosis: Hyperbilirubinemia  Assessment and Plan of Treatment:      PRINCIPAL DISCHARGE DIAGNOSIS  Diagnosis: Sickle cell crisis  Assessment and Plan of Treatment: You were admitted for sickle cell crisis. You had severe abdominal pain and back pain. Your hemoglobin was stable during your admission, between 5-6. You were seen by Hematologst - Dr. Jordan. You were placed on hydromorphone which was tapered down as tolerated. You were given aggressive IV fluid hydration throughout your stay. Your pain was well controlled throughout your hospitalization, however your pain became more uncontrolled on 3/15. You received 1 unit of packed red blood cells in the setting of severe back and hip pain.   Please follow up outpatient with your hematologist Dr. Jordan.      SECONDARY DISCHARGE DIAGNOSES  Diagnosis: Cyclical vomiting syndrome  Assessment and Plan of Treatment: Cannabinoid hyperemesis syndrome. Pt with continuous nausea. Pt endorses cannabis use, which he uses to relieve the pain and decrease opioid use.      Diagnosis: Hyperbilirubinemia  Assessment and Plan of Treatment: Hyperbilirubinemia: Pt with Total bilirubin of 7.3, Direct bili: 3.7. GI Dr. Aldrich consulted. MRI Abdomen w/ contrast: Abnormal liver, likely due to iron overload from prior multiple packed red blood cell transfusions. Slight nodular contour of the liver may represent cirrhosis. No suspicious focal hepatic lesion is identified. Common bile duct measures up to 3 mm in caliber which is within normal. limits. No evidence for choledocholithiasis. Small ascites.  Please follow up with Gastroenterologist - Dr. Aldrich and Hepatologist Dr. Tristan     PRINCIPAL DISCHARGE DIAGNOSIS  Diagnosis: Sickle cell crisis  Assessment and Plan of Treatment: You were admitted for sickle cell crisis. You had severe abdominal pain and back pain. Your hemoglobin was stable during your admission, between 5-6. You were seen by Hematologst - Dr. Jordan. You were placed on hydromorphone which was tapered down as tolerated. You were given aggressive IV fluid hydration throughout your stay. Your pain was well controlled throughout your hospitalization, however your pain became more uncontrolled on 3/15. You received 1 unit of packed red blood cells in the setting of severe back and hip pain.   Please follow up outpatient with your hematologist Dr. Jordan.      SECONDARY DISCHARGE DIAGNOSES  Diagnosis: Cyclical vomiting syndrome  Assessment and Plan of Treatment: You had continuous nausea during your hospitalization. You endorsed cannabis use, relieve your abdominal pain. This is most likely the cause of your nausea and vomiting.      Diagnosis: Hyperbilirubinemia  Assessment and Plan of Treatment: You were found to have an elevated total bilirubin of 7.3, and elevated direct bilirubin at 3.7. Gastroenterologist Dr. Aldrich was consulted. MRI Abdomen with contrast revealed abnormal liver, likely due to iron overload from prior multiple packed red blood cell transfusions. Slight nodular contour of the liver may represent cirrhosis. No suspicious focal hepatic lesion is identified.   Please follow up with Gastroenterologist - Dr. Aldrich and Hepatologist Dr. Tristan     PRINCIPAL DISCHARGE DIAGNOSIS  Diagnosis: Sickle cell crisis  Assessment and Plan of Treatment: You were admitted for sickle cell crisis. You had severe abdominal pain and back pain. Your hemoglobin was stable during your admission, between 5-6. You were seen by Hematologst - Dr. Grider. You were placed on hydromorphone which was tapered down as tolerated. You were given aggressive IV fluid hydration throughout your stay. Your pain was well controlled throughout your hospitalization, however your pain became more uncontrolled on 3/15. You received 1 unit of packed red blood cells in the setting of severe back and hip pain.   PLEASE FOLLOW UP OUTPATIENT WITH YOUR HEMATOLOGIST DR. GRIDER      SECONDARY DISCHARGE DIAGNOSES  Diagnosis: Cyclical vomiting syndrome  Assessment and Plan of Treatment: You had continuous nausea during your hospitalization. You endorsed cannabis use to relieve your abdominal pain. This is most likely the cause of your nausea and vomiting.      Diagnosis: Hyperbilirubinemia  Assessment and Plan of Treatment: You were found to have an elevated total bilirubin of 7.3, and elevated direct bilirubin at 3.7. Gastroenterologist Dr. Aldrich was consulted. MRI Abdomen with contrast revealed abnormal liver, likely due to iron overload from prior multiple packed red blood cell transfusions. Slight nodular contour of the liver may represent cirrhosis. No suspicious focal hepatic lesion is identified.   PLEASE FOLLOW UP WITH GASTROENTEROLOGIST DR. ALDRICH AND HEPATOLOGIST DR. COVARRUBIAS UPON DISCHARGE.     PRINCIPAL DISCHARGE DIAGNOSIS  Diagnosis: Sickle cell crisis  Assessment and Plan of Treatment: You were admitted for sickle cell crisis. You had severe abdominal pain and back pain. Your hemoglobin was stable during your admission, between 5-6. You were seen by Hematologst - Dr. Grider. You were placed on HYDROMORPHONE which was tapered down as tolerated. You were given aggressive IV fluid hydration throughout your stay. Your pain was well controlled throughout your hospitalization, however your pain became more uncontrolled on 3/15. You received 1 unit of packed red blood cells in the setting of severe back and hip pain which improved your symptoms.   PLEASE FOLLOW UP OUTPATIENT WITH YOUR HEMATOLOGIST DR. GRIDER.      SECONDARY DISCHARGE DIAGNOSES  Diagnosis: Hyperbilirubinemia  Assessment and Plan of Treatment: You were found to have an elevated total bilirubin of 7.3 and elevated direct bilirubin at 3.7. Gastroenterologist Dr. Aldrich was consulted. MRI Abdomen with contrast revealed abnormal liver, nodular contour of the liver may represent cirrhosis most, likely due to iron overload from multiple packed red blood cell transfusions received for underlying sickle cell disease.   PLEASE FOLLOW UP WITH GASTROENTEROLOGIST DR. ALDRICH AND HEPATOLOGIST DR. COVARRUBIAS UPON DISCHARGE FOR FURTHER RECOMMENDATIONS.    Diagnosis: Cyclical vomiting syndrome  Assessment and Plan of Treatment: You had continuous nausea during your hospitalization. You endorsed Cannabis use to relieve your abdominal pain. This is most likely the cause of your nausea and vomiting.  PLEASE FOLLOW UP OUTPATIENT WITH YOUR HEMATOLOGIST DR. GRIDER.

## 2022-03-16 NOTE — DIETITIAN INITIAL EVALUATION ADULT. - OTHER INFO
Pt visited. Pt Reports  Po tolerated. Pt ate Mashed Potatoes 100 % of it. Pt states he eats small Portions. No chewing or swallowing difficulty Reported. NKFA. Per Pt HT 69 inches,  . weight is stable.

## 2022-03-16 NOTE — DISCHARGE NOTE PROVIDER - HOSPITAL COURSE
Patient is a 40 y/o with gout and SCD recently hospitalized 2/24-2/26, 2/28-3/7 for pain crisis presenting with 5 hours of new pain, all over, primarily in abdomen and bilious vomiting. Patient reports he was feeling okay since discharge a few days ago until today at noon when he began having pain and took oxy. The pain progressed and he began vomiting and having diarrhea. Pt reports that he feels cold. Pt reports pain all over his body.  ickle cell crisis.   H/H stable Patient with known sickle cell disease. Baseline hemoglobin around 5 to 6. Placed on Hydromorphone IV, tapered down as tolerated. Pain management consulted. Heme/onc Dr. Jordan following. Placed on standing IV fluids    Cannabinoid hyperemesis syndrome. Pt with continuous nausea. Pt endorses cannabis use, which he uses to relieve the pain and decrease opioid use.    Hyperbilirubinemia: Pt with Total bilirubin of 7.3, Direct bili: 3.7. GI Dr. Aldrich consulted. MRI Abdomen w/ contrast: Abnormal liver, likely due to iron overload from prior multiple packed red blood cell transfusions. Slight nodular contour of the liver may represent cirrhosis. No suspicious focal hepatic lesion is identified. Common bile duct measures up to 3 mm in caliber which is within normal. limits. No evidence for choledocholithiasis. Small ascites.    Patient received 1U PRBCs on 3/16 in the setting of severe back and hip pain. Baseline hemoglobin around 5 to 6.     Given patient's improved clinical status and current hemodynamic stability, decision was made to discharge the patient.  Patient is stable for discharge per attending and is advised to follow up with PCP as outpatient  Please refer to patient's complete medical chart with documents for a full hospital course, for this is only a brief summary. Patient is a 40 y/o with gout and SCD recently hospitalized 2/24-2/26, 2/28-3/7 for pain crisis presenting with 5 hours of new pain, all over, primarily in abdomen and bilious vomiting. Patient reports he was feeling okay since discharge a few days ago until today at noon when he began having pain and took oxy. The pain progressed and he began vomiting and having diarrhea. Pt reports that he feels cold. Pt reports pain all over his body.  sickle cell crisis.   H/H stable Patient with known sickle cell disease. Baseline hemoglobin around 5 to 6. Placed on Hydromorphone IV, tapered down as tolerated. Pain management consulted. Heme/onc Dr. Jordan following. Placed on standing IV fluids    Cannabinoid hyperemesis syndrome. Pt with continuous nausea. Pt endorses cannabis use, which he uses to relieve the pain and decrease opioid use.    Hyperbilirubinemia: Pt with Total bilirubin of 7.3, Direct bili: 3.7. GI Dr. Aldrich consulted. MRI Abdomen w/ contrast: Abnormal liver, likely due to iron overload from prior multiple packed red blood cell transfusions. Slight nodular contour of the liver may represent cirrhosis. No suspicious focal hepatic lesion is identified. Common bile duct measures up to 3 mm in caliber which is within normal. limits. No evidence for choledocholithiasis. Small ascites.    Patient received 1U PRBCs on 3/16 in the setting of severe back and hip pain. Baseline hemoglobin around 5 to 6.     Given patient's improved clinical status and current hemodynamic stability, decision was made to discharge the patient.  Patient is stable for discharge per attending and is advised to follow up with PCP as outpatient  Please refer to patient's complete medical chart with documents for a full hospital course, for this is only a brief summary. 40 y/o with gout and Sickle cell disease recently hospitalized 2/24-2/26, 2/28-3/7 for pain crisis presenting with 5 hours of new pain, all over, primarily in abdomen and bilious vomiting. Patient reports he was feeling okay since discharge a few days ago until today at noon when he began having pain and took oxy. The pain progressed and he began vomiting and having diarrhea. Pt reports that he feels cold. Pt reports pain all over his body.    Patient admitted for sickle cell crisis. Baseline hemoglobin around 5 to 6. Placed on Hydromorphone IV, tapered down as tolerated. Pain management consulted. Heme/onc Dr. Jordan following. Placed on standing IV fluids    Cannabinoid hyperemesis syndrome. Pt with continuous nausea. Pt endorses cannabis use, which he uses to relieve the pain and decrease opioid use.    Hyperbilirubinemia: Pt with Total bilirubin of 7.3, Direct bili: 3.7. GI Dr. Aldrich consulted. MRI Abdomen w/ contrast: Abnormal liver, likely due to iron overload from prior multiple packed red blood cell transfusions. Slight nodular contour of the liver may represent cirrhosis. No suspicious focal hepatic lesion is identified. Common bile duct measures up to 3 mm in caliber which is within normal. limits. No evidence for choledocholithiasis. Small ascites.    Patient received 1 U PRBC on 3/16 in the setting of severe back and hip pain even while on pain management.     Given patient's improved clinical status and current hemodynamic stability, decision was made to discharge the patient.  Patient is stable for discharge per attending and is advised to follow up with PCP as outpatient  Please refer to patient's complete medical chart with documents for a full hospital course, for this is only a brief summary.

## 2022-03-16 NOTE — PROGRESS NOTE ADULT - ATTENDING COMMENTS
Acute appendicitis ruled out by CT scan. No surgical intervention needed.
- Elevated LFTs.  - Cirrhosis.  - Abdominal pain.    Patient seen and examined. Discussed MRI findings- iron deposition likely from frequent PRBCs with signs of cirrhosis, suspect partially accounting for bilirubinemia (which is overall stable/downtrending) and mild INR elevation. Discussed pt will need close GI and Hepatology follow-up eventually for EGD and US q6months.
- Abdominal pain, n/v.  - Elevated LFTs.    Patient seen and examined. Appears well other than jaundice. Abd soft, minimally tener epigastrium. Recommend MRI/MRCP to evaluate biliary tree given jaundice. PPI BID.
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 Jordan  -Patient counseled on abstinence if feasible or at least cutting back  - f/u GI  - f/u hepatology  - Baseline Hgb around 5.5 to 6; Hold off transfusions unless drops further or symptomatic
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 Slight nodular contour of the liver may represent cirrhosis. CBD measures up to 3 mm. Small ascites.  - Will need outpatient EGD  -Continue IVF hydration  -pain control, pain management f/u d/w STACY Paulino; pain seems controlled; continue Dilaudid 2mg q 3 hrs; currently well tolerated  -serial CBC, follow up with Dr. Jordan  -Patient counseled on abstinence if feasible or at least cutting back  - f/u GI  - f/u hepatology  - Baseline Hgb around 5.5 to 6; Hold off transfusions unless drops further or symptomatic .
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 Slight nodular contour of the liver may represent cirrhosis. CBD measures up to 3 mm. Small ascites.  - Will need outpatient EGD  -Continue IVF hydration  -pain control, pain management f/u d/w STACY Paulino; pain seems controlled; continue Dilaudid 2mg q 3 hrs; currently well tolerated  -serial CBC, follow up with Dr. Jordan  -Patient counseled on abstinence if feasible or at least cutting back  - f/u GI  - f/u hepatology.

## 2022-03-16 NOTE — PROGRESS NOTE ADULT - PROBLEM SELECTOR PLAN 7
Heparin SQ  Patient is independent

## 2022-03-16 NOTE — PROGRESS NOTE ADULT - PROBLEM SELECTOR PLAN 3
Pt with Total bilirubin of 7.3  Direct bili: 3.7  GI Dr. Aldrich consulted.  MRI Abdomen w/ contrast: Abnormal liver, likely due to iron overload from prior multiple packed red blood cell transfusions. Slight nodular contour of the liver may represent cirrhosis. No suspicious focal hepatic lesion is identified. Common bile duct measures up to 3 mm in caliber which is within normal   limits. No evidence for choledocholithiasis. Small ascites.
Pt with Total bilirubin of 7.3  Direct bili: 3.7  GI Dr. Adlrich consulted.  MRI Abdomen w/ contrast: Abnormal liver, likely due to iron overload from prior multiple packed red blood cell transfusions. Slight nodular contour of the liver may represent cirrhosis. No suspicious focal hepatic lesion is identified. Common bile duct measures up to 3 mm in caliber which is within normal   limits. No evidence for choledocholithiasis. Small ascites.
Pt with Total bilirubin of 7.3  Direct bili: 3.7  GI Dr. Aldrich consulted.  F/u MRCP w/ contrast
Surgery consulted   Repeat CT scan to r/o appendicitis was negative and surgical intervention was not warranted.

## 2022-03-16 NOTE — PROGRESS NOTE ADULT - SUBJECTIVE AND OBJECTIVE BOX
PGY-1 Progress Note discussed with attending    PAGER #: [[phone]] TILL 5:00 PM  PLEASE CONTACT ON CALL TEAM:  - On Call Team (Please refer to Rubi) FROM 5:00 PM - 8:30PM  - Nightfloat Team FROM 8:30 -7:30 AM    CHIEF COMPLAINT & BRIEF HOSPITAL COURSE:    INTERVAL HPI/OVERNIGHT EVENTS:   Pt seen and assessed at bedside. Pt received 1Unit of PRBCs overnight. Patient was very comfortable on examination not in any pain. Patient plan for discharge tomorrow.    REVIEW OF SYSTEMS:  CONSTITUTIONAL: No fever, weight loss, or fatigue  RESPIRATORY: No cough, wheezing, chills or hemoptysis; No shortness of breath  CARDIOVASCULAR: No chest pain, palpitations, dizziness, or leg swelling  GASTROINTESTINAL: No abdominal pain. No nausea, vomiting, or hematemesis; No diarrhea or constipation. No melena or hematochezia.  GENITOURINARY: No dysuria or hematuria, urinary frequency  NEUROLOGICAL: No headaches, memory loss, loss of strength, numbness, or tremors  SKIN: No itching, burning, rashes, or lesions     Vital Signs Last 24 Hrs  T(C): 36.8 (16 Mar 2022 04:30), Max: 37.1 (15 Mar 2022 14:08)  T(F): 98.2 (16 Mar 2022 04:30), Max: 98.7 (15 Mar 2022 14:08)  HR: 78 (16 Mar 2022 04:30) (71 - 78)  BP: 146/76 (16 Mar 2022 04:30) (144/74 - 146/78)  BP(mean): --  RR: 18 (16 Mar 2022 04:30) (16 - 18)  SpO2: 94% (16 Mar 2022 04:30) (94% - 94%)    PHYSICAL EXAMINATION:  GENERAL: NAD, well built, jaundiced  HEAD:  Atraumatic, Normocephalic  EYES:  scleral icterus  NECK: Supple, No JVD, Normal thyroid  CHEST/LUNG: Port in middle of chest. Clear to auscultation. Clear to percussion bilaterally; No rales, rhonchi, wheezing, or rubs  HEART: Regular rate and rhythm; No murmurs, rubs, or gallops  ABDOMEN: + epigastric tenderness, Soft, Nondistended; Bowel sounds present  NERVOUS SYSTEM:  Alert & Oriented X3,    EXTREMITIES:  2+ Peripheral Pulses, No clubbing, cyanosis, or edema  SKIN: warm                             6.7    14.88 )-----------( 320      ( 16 Mar 2022 08:19 )             19.1     03-16    142  |  113<H>  |  6<L>  ----------------------------<  93  3.5   |  24  |  0.68    Ca    8.3<L>      16 Mar 2022 08:19  Phos  2.9     03-16  Mg     1.5     03-16    TPro  6.5  /  Alb  3.0<L>  /  TBili  6.9<H>  /  DBili  3.6<H>  /  AST  106<H>  /  ALT  80<H>  /  AlkPhos  102  03-16    LIVER FUNCTIONS - ( 16 Mar 2022 08:19 )  Alb: 3.0 g/dL / Pro: 6.5 g/dL / ALK PHOS: 102 U/L / ALT: 80 U/L DA / AST: 106 U/L / GGT: x               PT/INR - ( 15 Mar 2022 07:01 )   PT: 14.2 sec;   INR: 1.19 ratio             CAPILLARY BLOOD GLUCOSE      RADIOLOGY & ADDITIONAL TESTS:

## 2022-03-16 NOTE — DISCHARGE NOTE PROVIDER - CARE PROVIDER_API CALL
Antoni Aldrich)  Gastroenterology; Internal Medicine  [address] [facility], [address] [place] [address]  Phone: [phone]  Fax: [phone]  Follow Up Time:     Sukumar Tristan)  Internal Medicine  [address] [place] [address]  Phone: [phone]  Fax: [phone]  Follow Up Time:     Chuck Jordan  INTERNAL MEDICINE  [address] [place] [address]  Phone: [phone]  Fax: [phone]  Follow Up Time:

## 2022-03-16 NOTE — PROGRESS NOTE ADULT - ASSESSMENT
Observe overnight  Continuous fetal monitoring  Probable DC in the AM   Welcome Lora Rod  ×  Update Personal Info  FAQ  Search Results Help  Help  ×Due to maintenance there may be some small downtime tonight 3/8 around 8pm. If you experience continuing issues, close and open your browser and try again.   Patient Search   Multi-Patient Search   MME Calculator   Reports   Drug List   Designation   My RAFAELA #  Data Detail Level: Printer-Friendly View Extended View  Confidential Drug Utilization Report  Search Terms: macey gonzales, 1980Search Date: 03/12/2022 00:43:56 AM  The Drug Utilization Report below displays all of the controlled substance prescriptions, if any, that your patient has filled in the last twelve months. The information displayed on this report is compiled from pharmacy submissions to the Department, and accurately reflects the information as submitted by the pharmacies.    This report was requested by: Lora Rod | Reference #: [id number]    Others' Prescriptions  Patient Name: Macey GonzalesBirth Date: 1980  Address: [address] [place] [id number]: Male  Rx Written	Rx Dispensed	Drug	Quantity	Days Supply	Prescriber Name	Prescriber Rafaela #	Payment Method	Dispenser  02/24/2022	02/24/2022	oxycodone hcl (ir) 30 mg tab	180	30	Jordan, Shirley SOLITARIO	[id number]	Insurance	[facility]   12/30/2021	12/30/2021	oxycodone hcl (ir) 30 mg tab	180	30	BeatriceDarrion farnsworth	[id number]	Insurance	[facility]   12/02/2021	12/02/2021	oxycodone hcl (ir) 30 mg tab	180	30	Jordan, Shirley SOLITARIO	[id number]	Insurance	[facility]   11/04/2021	11/05/2021	oxycodone hcl 30 mg tablet	180	30	Jordan, Shirley SOLITARIO	[id number]	Insurance	[facility]   10/07/2021	10/07/2021	oxycodone hcl 30 mg tablet	180	30	Jordan, Shirley SOLITARIO	[id number]	Insurance	[facility]   08/12/2021	08/12/2021	oxycodone hcl 30 mg tablet	180	30	Jordan, Shirley SOLITARIO	[id number]	Insurance	[facility]   07/15/2021	07/15/2021	oxycodone hcl 30 mg tablet	180	30	Jordan, CeeCourtney MD	[id number]	Insurance	[facility]   05/19/2021	05/19/2021	oxycodone hcl 30 mg tablet	180	30	Shirley Jordan MD	[id number]	Insurance	[facility]

## 2022-03-16 NOTE — PROGRESS NOTE ADULT - PROBLEM SELECTOR PLAN 5
Surgery consulted   Repeat CT scan to r/o appendicitis was negative and surgical intervention was not warranted.
Surgery consulted   Repeat CT scan to r/o appendicitis was negative and surgical intervention was not warranted.
Heparin SQ  Patient is independent
Surgery consulted   Repeat CT scan to r/o appendicitis was negative and surgical intervention was not warranted.

## 2022-03-16 NOTE — DISCHARGE NOTE PROVIDER - PROVIDER TOKENS
PROVIDER:[TOKEN:[[id number]]],PROVIDER:[TOKEN:[[id number]]],PROVIDER:[TOKEN:[[id number]]]

## 2022-03-16 NOTE — PROGRESS NOTE ADULT - SUBJECTIVE AND OBJECTIVE BOX
Source of information: TOLU VARGAS, Chart review  Patient language: English  : n/a    HPI:  Patient is a 42 y/o with gout and SCD recently hospitalized 2/24-2/26, 2/28-3/7 for pain crisis presenting with 5 hours of new pain, all over, primarily in abdomen and bilious vomiting. Patient reports he was feeling okay since discharge a few days ago until today at noon when he began having pain and took oxy. The pain progressed and he began vomiting and having diarrhea. Pt reports that he feels cold. Pt reports pain all over his body. (11 Mar 2022 01:54)    Pt diagnosed with sickle cell crisis. Retic percent 15.9. Bilirubin 7.9. CTAP demonstrates Hepatic morphology is nonspecific but can be seen with underlying   cirrhosis. Enlarged upper abdominal lymph nodes are nonspecific and can be seen in the setting of portal hypertension.  MRI abdomen 3/14 demonstrates- Abnormal liver signal as detailed above, likely due to iron overload from prior multiple packed red blood cell transfusions. Slight nodular contour of the liver may represent cirrhosis. Clinical correlation is recommended. No suspicious focal hepatic lesion is identified. Common bile duct measures up to 3 mm in caliber which is within normal   limits. No evidence for choledocholithiasis. Small ascites. Mild bilateral pleural effusions.    Pain consulted for sickle cell pain. Pt seen and examined at bedside. Pt reports back, pain greatest over low back. Pt rates pain 8/10. Describes pain as aching, intermittently sharp, non- radiating, not alleviated by current pain medications, exacerbated by movement. Dilaudid dose increased on 3/15 to dilaudid 3mg IV q3hr standing per heme/onc. Pt reports increased pain overnight. H/H 3/15 5.5/15.4, received 1 unit PRBC 3/16 AM, h/h increased to 6.7/19.1. Pt tolerating PO diet. Pt denies chest pain, SOB, nausea, vomiting, constipation and itchiness. Reports lethargy and dark urine. Patient stated goal for pain control: to be able to take deep breaths, get out of bed to chair and ambulate with tolerable pain control. Pt reports ambulating throughout the day. Pt on chronic opioids at home oxycodone 30mg q4-6h PRN.    PAST MEDICAL & SURGICAL HISTORY:  Sickle cell anemia    Gout    History of cholecystectomy        FAMILY HISTORY:  Family history of sickle cell trait (Father, Mother)        Social History:  Patient is from home, non smoker or alcoholic hx (11 Mar 2022 01:54)   [X ]Denies ETOH use and smoking   [X] + marijuana smoking     Allergies    ceftriaxone (Anaphylaxis)  hydroxyurea (Other)  penicillin (Pruritus)  piperacillin-tazobactam (Urticaria)    MEDICATIONS  (STANDING):  colchicine 0.6 milliGRAM(s) Oral daily  folic acid 1 milliGRAM(s) Oral <User Schedule>  heparin   Injectable 5000 Unit(s) SubCutaneous every 8 hours  HYDROmorphone  Injectable 3 milliGRAM(s) IV Push every 3 hours  influenza   Vaccine 0.5 milliLiter(s) IntraMuscular once  pantoprazole    Tablet 40 milliGRAM(s) Oral two times a day  senna 2 Tablet(s) Oral at bedtime  sodium chloride 0.9%. 1000 milliLiter(s) (125 mL/Hr) IV Continuous <Continuous>    MEDICATIONS  (PRN):  acetaminophen     Tablet .. 650 milliGRAM(s) Oral every 6 hours PRN Temp greater or equal to 38C (100.4F), Mild Pain (1 - 3)      Vital Signs Last 24 Hrs  T(C): 36.8 (16 Mar 2022 04:30), Max: 37.1 (15 Mar 2022 14:08)  T(F): 98.2 (16 Mar 2022 04:30), Max: 98.7 (15 Mar 2022 14:08)  HR: 78 (16 Mar 2022 04:30) (71 - 78)  BP: 146/76 (16 Mar 2022 04:30) (144/74 - 146/78)  BP(mean): --  RR: 18 (16 Mar 2022 04:30) (16 - 18)  SpO2: 94% (16 Mar 2022 04:30) (94% - 94%)  COVID-19 PCR: NotDetec (10 Mar 2022 19:07)  COVID-19 PCR: NotDetec (07 Mar 2022 07:08)  COVID-19 PCR: NotDetec (28 Feb 2022 11:17)  COVID-19 PCR: NotDetec (25 Feb 2022 00:43)  SARS-CoV-2: NotDetec (28 Oct 2021 05:25)  COVID-19 PCR: NotDetec (28 Oct 2021 03:35)  COVID-19 PCR: NotDetec (07 Oct 2021 18:02)    LABS: Reviewed                          6.7    14.88 )-----------( 320      ( 16 Mar 2022 08:19 )             19.1     03-16    142  |  113<H>  |  6<L>  ----------------------------<  93  3.5   |  24  |  0.68    Ca    8.3<L>      16 Mar 2022 08:19  Phos  2.9     03-16  Mg     1.5     03-16    TPro  6.5  /  Alb  3.0<L>  /  TBili  6.9<H>  /  DBili  3.6<H>  /  AST  106<H>  /  ALT  80<H>  /  AlkPhos  102  03-16    PT/INR - ( 15 Mar 2022 07:01 )   PT: 14.2 sec;   INR: 1.19 ratio           LIVER FUNCTIONS - ( 16 Mar 2022 08:19 )  Alb: 3.0 g/dL / Pro: 6.5 g/dL / ALK PHOS: 102 U/L / ALT: 80 U/L DA / AST: 106 U/L / GGT: x       Radiology: Reviewed  < from: MR Abdomen w/wo IV Cont (03.14.22 @ 19:17) >  ACC: [id number] EXAM:  MR ABDOMEN WAW IC                          PROCEDURE DATE:  03/14/2022          INTERPRETATION:  CLINICAL INFORMATION: Hyperbilirubinemia. Evaluation for   biliary system. History of sickle cell disease. Multiple packed red blood   cell transfusions in the past.    COMPARISON: No prior abdominal MR is available for comparison. Reference   is made with a previous abdominal CT dated 3/10/2022    CONTRAST/COMPLICATIONS:  IV Contrast: Gadavist  8.5 cc administered   1.5 cc discarded  Oral Contrast: NONE  Complications: None reported at time of study completion    PROCEDURE:  MRI of the abdomen was performed.  MRCP was performed.    FINDINGS:  LOWER CHEST: Mild bilateral pleural effusions, right greater than left.    LIVER: The liver appears diffusely dark on T2-weighted images. In   addition, there is signal attenuation of the liver on in-phase   T1-weighted gradient echo images. Findings likely due to iron overload   from prior multiple packed red blood cell transfusions. Slight nodular   contour of the liver may represent cirrhosis. Clinical correlation is   recommended. No suspicious focal hepatic lesion is identified.    BILE DUCTS: Normal caliber. Common bile duct measures up to 3 mm in   caliber which is within normal limits. No evidence for choledocholithiasis  GALLBLADDER: Status post cholecystectomy.  SPLEEN: Atrophic and diffusely calcified, likely due to autoinfarction   from patient's known sickle cell disease.  PANCREAS: Within normal limits.  ADRENALS: Within normal limits.  KIDNEYS/URETERS: A few small bilateral renal cysts. One of the left renal   cysts is hemorrhagic or proteinaceous.    VISUALIZED PORTIONS:  BOWEL: Within normal limits.  PERITONEUM: Small ascites.  VESSELS: The portal veinsand hepatic veins are patent.  RETROPERITONEUM/LYMPH NODES: No lymphadenopathy.  ABDOMINAL WALL: Within normal limits.  BONES: Within normal limits.    IMPRESSION:  Abnormal liver signal as detailed above, likely due to iron overload from   prior multiple packed red blood cell transfusions. Slight nodular contour   of the liver may represent cirrhosis. Clinical correlation is   recommended. No suspicious focal hepatic lesion is identified.    Common bile duct measures up to 3 mm in caliber which is within normal   limits. No evidence for choledocholithiasis.    Small ascites.    Mild bilateral pleural effusions.    --- End of Report ---            RONNY ORTIZ MD; Attending Radiologist  This document has been electronically signed. Mar 15 2022  9:47AM    < end of copied text >      < from: Xray Chest 1 View-PORTABLE IMMEDIATE (Xray Chest 1 View-PORTABLE IMMEDIATE .) (03.10.22 @ 22:08) >    ACC: [id number] EXAM:  XR CHEST PORTABLE IMMED 1V                          PROCEDURE DATE:  03/10/2022          INTERPRETATION:  Portable chest radiograph    CLINICAL INFORMATION: Bilious vomiting.    TECHNIQUE:  Portable  AP chest radiograph.    COMPARISON: 10/28/2021 .    FINDINGS:  CATHETERS AND TUBES: Mediport catheter tip in SVC.    PULMONARY: The visualized lungs are clear of airspace consolidations or   effusions.   No pneumothorax.    HEART/VASCULAR: The  heart is enlarged in transverse diameter. .    BONES: Visualized osseous structures are intact.    IMPRESSION:   No radiographic evidence of active chest disease.    --- End of Report ---            RAMO MORALES MD; Attending Radiologist  This document has been electronically signed. Mar 11 2022  1:24PM    < end of copied text >    < from: CT Abdomen and Pelvis w/ IV Cont (03.10.22 @ 20:17) >    ACC: [id number] EXAM:  CT ABDOMEN AND PELVIS IC                          PROCEDURE DATE:  03/10/2022          INTERPRETATION:  CLINICAL INFORMATION: Right upper quadrant pain and   vomiting    COMPARISON: CT abdomen pelvis 3/4/2022    CONTRAST/COMPLICATIONS:  IV Contrast: Omnipaque 350  90 cc administered   10 cc discarded  Oral Contrast: NONE  Complications: None reported at time of study completion    PROCEDURE:  CT of the Abdomen and Pelvis was performed.  Sagittal and coronal reformats were performed.    FINDINGS:  LOWER CHEST: Cardiomegaly. Bibasilar linear type atelectasis.    LIVER: Prominent hepatorenal notching and hypertrophy lateral segment of   the left hepatic lobe. Findings can be seen with cirrhosis. Periportal   edema.  BILE DUCTS: Normal caliber.  GALLBLADDER: Cholecystectomy.  SPLEEN: Jason infarcted calcified spleen.  PANCREAS: Within normal limits.  ADRENALS: Within normal limits.  KIDNEYS/URETERS: Left renal cyst. Symmetric enhancement. Partial left   renal duplication. No hydronephrosis.    BLADDER: Within normal limits.  REPRODUCTIVE ORGANS: Prostate within normal limits.    BOWEL: No bowel obstruction. Appendix is normal.  PERITONEUM: No ascites.  VESSELS: Within normal limits.  RETROPERITONEUM/LYMPH NODES: Stable enlarged upper abdominal lymph nodes   in the gastrohepatic, periportal, and portacaval regions, nonspecific.  ABDOMINAL WALL: Small fat-containing left inguinal hernia.  BONES: Stable moderate T12 and mild to moderate L1 compression   deformities..    IMPRESSION:  No acute abdominal pathology.    Hepatic morphology is nonspecific but can be seen with underlying   cirrhosis.    Enlarged upper abdominal lymph nodes are nonspecific and can be seen in   the setting of portal hypertension.        --- End of Report ---            DIAZ URIOSTEGUI MD; Attending Radiologist  This document has been electronically signed. Mar 10 2022  8:29PM    < end of copied text >      ORT Score -   Family Hx of substance abuse	Female	      Male  Alcohol 	                                           1                     3  Illegal drugs	                                   2                     3  Rx drugs                                           4 	                  4  Personal Hx of substance abuse		  Alcohol 	                                          3	                  3  Illegal drugs                                     4	                  4  Rx drugs                                            5 	                  5  Age between 16- 45 years	           1                     1  hx preadolescent sexual abuse	   3 	                  0  Psychological disease		  ADD, OCD, bipolar, schizophrenia   2	          2  Depression                                           1 	          1  Total: 5    a score of 3 or lower indicates low risk for opioid abuse		  a score of 4-7 indicates moderate risk for opioid abuse		  a score of 8 or higher indicates high risk for opioid abuse    REVIEW OF SYSTEMS:  CONSTITUTIONAL: No fever + fatigue  RESPIRATORY: No cough, wheezing, chills or hemoptysis; No shortness of breath  CARDIOVASCULAR: No chest pain, palpitations, dizziness, or leg swelling  GASTROINTESTINAL: No loss of appetite, decreased PO intake. + epigastric pain. No nausea, vomiting; No diarrhea or constipation.   GENITOURINARY: No dysuria, frequency, hematuria, retention or incontinence +dark urine  MUSCULOSKELETAL: + generalized body pain, + back pain, left hip pain, no swelling; no upper or lower motor strength weakness, no saddle anesthesia, bowel/bladder incontinence, no falls   NEURO: No headaches, No numbness/tingling b/l LE, No weakness    PHYSICAL EXAM:  GENERAL:  Faigued & Oriented X4, cooperative, NAD, Speech is clear.   RESPIRATORY: Respirations even and unlabored. Clear to auscultation bilaterally; No rales, rhonchi, wheezing, or rubs  CARDIOVASCULAR: + right chest wall mediport c/d/i; Normal S1/S2, regular rate and rhythm; No murmurs, rubs, or gallops. No JVD.   GASTROINTESTINAL:  Soft, + epigastric tenderness, Nondistended; Bowel sounds present  PERIPHERAL VASCULAR:  Extremities warm without edema. 2+ Peripheral Pulses, No cyanosis, No calf tenderness  MUSCULOSKELETAL: Motor Strength 5/5 B/L upper and lower extremities; moves all extremities equally against gravity; ROM intact; negative SLR; + lumbar back tenderness  SKIN: + icterus + generalized jaundice; + multiple tattoos site c/d/i without erythema, edema. Warm, dry, intact. No rashes, lesions, scars or wounds.     Risk factors associated with adverse outcomes related to opioid treatment  [ ]  Concurrent benzodiazepine use  [X ]  History/ Active substance use or alcohol use disorder  [ ] Psychiatric co-morbidity  [ ] Sleep apnea  [ ] COPD  [ ] BMI> 35  [X ] Liver dysfunction  [ ] Renal dysfunction  [ ] CHF  [X ] Smoker- marijuana   [ ]  Age > 60 years    [X ]  [place]  Reviewed and Copied to Chart. See below.    Plan of care and goal oriented pain management treatment options were discussed with patient and /or primary care giver; all questions and concerns were addressed and care was aligned with patient's wishes.    Educated patient on goal oriented pain management treatment options     03-16-22 @ 10:46

## 2022-03-16 NOTE — DIETITIAN INITIAL EVALUATION ADULT. - PROBLEM SELECTOR PLAN 2
- Patient with sickle cell crisis came with abdominal pain with diarrhea   - CT abdomen IV contrast showed Dilated retrocecal appendix, with small amount of free fluid within the     right upper quadrant hepatorenal space.  - Surgery is following and recommend Repeat CT scan   - Also it showed possible pyelonephritis so will start on Rocephin IV as well   - f/u CT abdomen   - IV fluids and Abx

## 2022-03-16 NOTE — DIETITIAN INITIAL EVALUATION ADULT. - PROBLEM SELECTOR PLAN 1
- Patient with hx of sickle cell with previous admissions of sickle crisis came due to abdominal pain and vomiting   - H&H 6.5/18.6  - Will start IV fluids 0.9% NS 125cc/hr   - Pain medications ( Tylenol, Toradol, Oxycodone )  - Dr. Jordan is on board

## 2022-03-16 NOTE — DISCHARGE NOTE PROVIDER - ATTENDING DISCHARGE PHYSICAL EXAMINATION:
41 Male with epigastric pain, nausea and vomit. Admitted for Sickle Cell with painful acute vasoocclusive crisis, direct and indirect hyperbilirubinemia, suspected cirrhosis 2/2 Iron overload, possible cannabis induced hyperemesis syndrome and anemia due to active sickling. MRCP showing abnormal liver signal likely due to iron overload from prior multiple packed red blood cell transfusions. Slight nodular contour of the liver may represent cirrhosis. CBD measures up to 3 mm. Small ascites. Patient will need to follow up with outpatient hepatology and GI for possible EGD and further workup. Pain improved with IVF hydration, pain management, and 1u pRBC. Patient stable to follow up outpatient.    PHYSICAL EXAMINATION:  GENERAL: NAD, well built, jaundiced  HEAD:  Atraumatic, Normocephalic  EYES:  scleral icterus  NECK: Supple, No JVD, Normal thyroid  CHEST/LUNG: Port in middle of chest. Clear to auscultation. Clear to percussion bilaterally; No rales, rhonchi, wheezing, or rubs  HEART: Regular rate and rhythm; No murmurs, rubs, or gallops  ABDOMEN: + epigastric tenderness, Soft, Nondistended; Bowel sounds present  NERVOUS SYSTEM:  Alert & Oriented X3,    EXTREMITIES:  2+ Peripheral Pulses, No clubbing, cyanosis, or edema  SKIN: warm

## 2022-03-17 ENCOUNTER — TRANSCRIPTION ENCOUNTER (OUTPATIENT)
Age: 42
End: 2022-03-17

## 2022-03-17 VITALS
OXYGEN SATURATION: 97 % | HEART RATE: 75 BPM | RESPIRATION RATE: 18 BRPM | SYSTOLIC BLOOD PRESSURE: 149 MMHG | TEMPERATURE: 98 F | DIASTOLIC BLOOD PRESSURE: 84 MMHG

## 2022-03-17 LAB
ANION GAP SERPL CALC-SCNC: 5 MMOL/L — SIGNIFICANT CHANGE UP (ref 5–17)
BUN SERPL-MCNC: 6 MG/DL — LOW (ref 7–18)
CALCIUM SERPL-MCNC: 8.1 MG/DL — LOW (ref 8.4–10.5)
CHLORIDE SERPL-SCNC: 114 MMOL/L — HIGH (ref 96–108)
CO2 SERPL-SCNC: 22 MMOL/L — SIGNIFICANT CHANGE UP (ref 22–31)
CREAT SERPL-MCNC: 0.66 MG/DL — SIGNIFICANT CHANGE UP (ref 0.5–1.3)
EGFR: 120 ML/MIN/1.73M2 — SIGNIFICANT CHANGE UP
GLUCOSE SERPL-MCNC: 91 MG/DL — SIGNIFICANT CHANGE UP (ref 70–99)
HCT VFR BLD CALC: 18.2 % — CRITICAL LOW (ref 39–50)
HGB BLD-MCNC: 6.5 G/DL — CRITICAL LOW (ref 13–17)
MAGNESIUM SERPL-MCNC: 1.5 MG/DL — LOW (ref 1.6–2.6)
MCHC RBC-ENTMCNC: 31.7 PG — SIGNIFICANT CHANGE UP (ref 27–34)
MCHC RBC-ENTMCNC: 35.7 GM/DL — SIGNIFICANT CHANGE UP (ref 32–36)
MCV RBC AUTO: 88.8 FL — SIGNIFICANT CHANGE UP (ref 80–100)
NRBC # BLD: 2 /100 WBCS — HIGH (ref 0–0)
PHOSPHATE SERPL-MCNC: 4.5 MG/DL — SIGNIFICANT CHANGE UP (ref 2.5–4.5)
PLATELET # BLD AUTO: 318 K/UL — SIGNIFICANT CHANGE UP (ref 150–400)
POTASSIUM SERPL-MCNC: 3.4 MMOL/L — LOW (ref 3.5–5.3)
POTASSIUM SERPL-SCNC: 3.4 MMOL/L — LOW (ref 3.5–5.3)
RBC # BLD: 2.05 M/UL — LOW (ref 4.2–5.8)
RBC # FLD: 21.2 % — HIGH (ref 10.3–14.5)
SARS-COV-2 RNA SPEC QL NAA+PROBE: SIGNIFICANT CHANGE UP
SODIUM SERPL-SCNC: 141 MMOL/L — SIGNIFICANT CHANGE UP (ref 135–145)
WBC # BLD: 13.23 K/UL — HIGH (ref 3.8–10.5)
WBC # FLD AUTO: 13.23 K/UL — HIGH (ref 3.8–10.5)

## 2022-03-17 PROCEDURE — 99239 HOSP IP/OBS DSCHRG MGMT >30: CPT | Mod: GC

## 2022-03-17 PROCEDURE — 87046 STOOL CULTR AEROBIC BACT EA: CPT

## 2022-03-17 PROCEDURE — 36415 COLL VENOUS BLD VENIPUNCTURE: CPT

## 2022-03-17 PROCEDURE — 87635 SARS-COV-2 COVID-19 AMP PRB: CPT

## 2022-03-17 PROCEDURE — 80048 BASIC METABOLIC PNL TOTAL CA: CPT

## 2022-03-17 PROCEDURE — 86922 COMPATIBILITY TEST ANTIGLOB: CPT

## 2022-03-17 PROCEDURE — 83605 ASSAY OF LACTIC ACID: CPT

## 2022-03-17 PROCEDURE — 36430 TRANSFUSION BLD/BLD COMPNT: CPT

## 2022-03-17 PROCEDURE — 74177 CT ABD & PELVIS W/CONTRAST: CPT | Mod: MG

## 2022-03-17 PROCEDURE — 83735 ASSAY OF MAGNESIUM: CPT

## 2022-03-17 PROCEDURE — P9040: CPT

## 2022-03-17 PROCEDURE — A9579: CPT

## 2022-03-17 PROCEDURE — 87493 C DIFF AMPLIFIED PROBE: CPT

## 2022-03-17 PROCEDURE — 87045 FECES CULTURE AEROBIC BACT: CPT

## 2022-03-17 PROCEDURE — 86901 BLOOD TYPING SEROLOGIC RH(D): CPT

## 2022-03-17 PROCEDURE — G1004: CPT

## 2022-03-17 PROCEDURE — 74183 MRI ABD W/O CNTR FLWD CNTR: CPT

## 2022-03-17 PROCEDURE — 84100 ASSAY OF PHOSPHORUS: CPT

## 2022-03-17 PROCEDURE — 81001 URINALYSIS AUTO W/SCOPE: CPT

## 2022-03-17 PROCEDURE — 86850 RBC ANTIBODY SCREEN: CPT

## 2022-03-17 PROCEDURE — 85045 AUTOMATED RETICULOCYTE COUNT: CPT

## 2022-03-17 PROCEDURE — 85027 COMPLETE CBC AUTOMATED: CPT

## 2022-03-17 PROCEDURE — 87177 OVA AND PARASITES SMEARS: CPT

## 2022-03-17 PROCEDURE — 85025 COMPLETE CBC W/AUTO DIFF WBC: CPT

## 2022-03-17 PROCEDURE — 80053 COMPREHEN METABOLIC PANEL: CPT

## 2022-03-17 PROCEDURE — 86900 BLOOD TYPING SEROLOGIC ABO: CPT

## 2022-03-17 PROCEDURE — 80076 HEPATIC FUNCTION PANEL: CPT

## 2022-03-17 PROCEDURE — 71045 X-RAY EXAM CHEST 1 VIEW: CPT

## 2022-03-17 PROCEDURE — 87040 BLOOD CULTURE FOR BACTERIA: CPT

## 2022-03-17 PROCEDURE — 99285 EMERGENCY DEPT VISIT HI MDM: CPT | Mod: 25

## 2022-03-17 RX ORDER — POTASSIUM CHLORIDE 20 MEQ
40 PACKET (EA) ORAL ONCE
Refills: 0 | Status: COMPLETED | OUTPATIENT
Start: 2022-03-17 | End: 2022-03-17

## 2022-03-17 RX ORDER — MAGNESIUM SULFATE 500 MG/ML
1 VIAL (ML) INJECTION ONCE
Refills: 0 | Status: COMPLETED | OUTPATIENT
Start: 2022-03-17 | End: 2022-03-17

## 2022-03-17 RX ADMIN — SODIUM CHLORIDE 125 MILLILITER(S): 9 INJECTION INTRAMUSCULAR; INTRAVENOUS; SUBCUTANEOUS at 05:23

## 2022-03-17 RX ADMIN — HYDROMORPHONE HYDROCHLORIDE 2 MILLIGRAM(S): 2 INJECTION INTRAMUSCULAR; INTRAVENOUS; SUBCUTANEOUS at 04:00

## 2022-03-17 RX ADMIN — HYDROMORPHONE HYDROCHLORIDE 2 MILLIGRAM(S): 2 INJECTION INTRAMUSCULAR; INTRAVENOUS; SUBCUTANEOUS at 03:25

## 2022-03-17 RX ADMIN — PANTOPRAZOLE SODIUM 40 MILLIGRAM(S): 20 TABLET, DELAYED RELEASE ORAL at 05:23

## 2022-03-17 RX ADMIN — HYDROMORPHONE HYDROCHLORIDE 2 MILLIGRAM(S): 2 INJECTION INTRAMUSCULAR; INTRAVENOUS; SUBCUTANEOUS at 11:11

## 2022-03-17 RX ADMIN — Medication 40 MILLIEQUIVALENT(S): at 11:18

## 2022-03-17 RX ADMIN — Medication 100 GRAM(S): at 11:22

## 2022-03-17 RX ADMIN — HYDROMORPHONE HYDROCHLORIDE 2 MILLIGRAM(S): 2 INJECTION INTRAMUSCULAR; INTRAVENOUS; SUBCUTANEOUS at 10:56

## 2022-03-17 RX ADMIN — HYDROMORPHONE HYDROCHLORIDE 2 MILLIGRAM(S): 2 INJECTION INTRAMUSCULAR; INTRAVENOUS; SUBCUTANEOUS at 07:59

## 2022-03-17 RX ADMIN — HEPARIN SODIUM 5000 UNIT(S): 5000 INJECTION INTRAVENOUS; SUBCUTANEOUS at 05:24

## 2022-03-17 RX ADMIN — HYDROMORPHONE HYDROCHLORIDE 2 MILLIGRAM(S): 2 INJECTION INTRAMUSCULAR; INTRAVENOUS; SUBCUTANEOUS at 07:44

## 2022-03-17 RX ADMIN — HYDROMORPHONE HYDROCHLORIDE 2 MILLIGRAM(S): 2 INJECTION INTRAMUSCULAR; INTRAVENOUS; SUBCUTANEOUS at 01:11

## 2022-03-17 RX ADMIN — Medication 1 MILLIGRAM(S): at 07:47

## 2022-03-17 RX ADMIN — Medication 0.6 MILLIGRAM(S): at 11:18

## 2022-03-17 NOTE — PHARMACOTHERAPY INTERVENTION NOTE - COMMENTS
Notified MD about anaphylaxis to ceftriaxone.  MD changed to Cipro
Provided patient education on discharge medications and provided handout
Patient received medication counseling prior to discharge and was provided educational handouts

## 2022-03-17 NOTE — PROGRESS NOTE ADULT - PROBLEM SELECTOR PROBLEM 7
Gastritis
Prophylactic measure
Prophylactic measure
Gastritis
Prophylactic measure

## 2022-03-17 NOTE — PROGRESS NOTE ADULT - SUBJECTIVE AND OBJECTIVE BOX
Pt seen, feeling well, no complaints.    MEDICATIONS  (STANDING):  colchicine 0.6 milliGRAM(s) Oral daily  folic acid 1 milliGRAM(s) Oral <User Schedule>  heparin   Injectable 5000 Unit(s) SubCutaneous every 8 hours  influenza   Vaccine 0.5 milliLiter(s) IntraMuscular once  pantoprazole    Tablet 40 milliGRAM(s) Oral two times a day  senna 2 Tablet(s) Oral at bedtime  sodium chloride 0.9%. 1000 milliLiter(s) (125 mL/Hr) IV Continuous <Continuous>    MEDICATIONS  (PRN):  acetaminophen     Tablet .. 650 milliGRAM(s) Oral every 6 hours PRN Temp greater or equal to 38C (100.4F), Mild Pain (1 - 3)  HYDROmorphone  Injectable 2 milliGRAM(s) IV Push every 3 hours PRN Severe Pain (7 - 10)      ROS  No fever, sweats, chills  No epistaxis, HA, sore throat  No CP, SOB, cough, sputum  No n/v/d, abd pain, melena, hematochezia  No edema  No rash  No anxiety  No back pain, joint pain  No bleeding, bruising  No dysuria, hematuria    Vital Signs Last 24 Hrs  T(C): 36.7 (17 Mar 2022 04:36), Max: 36.9 (16 Mar 2022 20:44)  T(F): 98.1 (17 Mar 2022 04:36), Max: 98.4 (16 Mar 2022 20:44)  HR: 75 (17 Mar 2022 04:36) (75 - 84)  BP: 149/84 (17 Mar 2022 04:36) (149/84 - 155/87)  BP(mean): --  RR: 18 (17 Mar 2022 04:36) (16 - 18)  SpO2: 97% (17 Mar 2022 04:36) (93% - 97%)    PE  NAD  Awake, alert  icteric  remainder of exam limited 2/2 covid pandemic                          6.5    13.23 )-----------( 318      ( 17 Mar 2022 06:52 )             18.2       03-17    141  |  114<H>  |  6<L>  ----------------------------<  91  3.4<L>   |  22  |  0.66    Ca    8.1<L>      17 Mar 2022 06:52  Phos  4.5     03-17  Mg     1.5     03-17    TPro  6.5  /  Alb  3.0<L>  /  TBili  6.9<H>  /  DBili  3.6<H>  /  AST  106<H>  /  ALT  80<H>  /  AlkPhos  102  03-16

## 2022-03-17 NOTE — PROGRESS NOTE ADULT - ASSESSMENT
Welcome Lora Rod  ×  Update Personal Info  FAQ  Search Results Help  Help  ×Due to maintenance there may be some small downtime tonight 3/8 around 8pm. If you experience continuing issues, close and open your browser and try again.   Patient Search   Multi-Patient Search   MME Calculator   Reports   Drug List   Designation   My RAFAELA #  Data Detail Level: Printer-Friendly View Extended View  Confidential Drug Utilization Report  Search Terms: macey gonzales, 1980Search Date: 03/12/2022 00:43:56 AM  The Drug Utilization Report below displays all of the controlled substance prescriptions, if any, that your patient has filled in the last twelve months. The information displayed on this report is compiled from pharmacy submissions to the Department, and accurately reflects the information as submitted by the pharmacies.    This report was requested by: Lora Rod | Reference #: [id number]    Others' Prescriptions  Patient Name: Macey GonzalesBirth Date: 1980  Address: [address] [place] [id number]: Male  Rx Written	Rx Dispensed	Drug	Quantity	Days Supply	Prescriber Name	Prescriber Rafaela #	Payment Method	Dispenser  02/24/2022	02/24/2022	oxycodone hcl (ir) 30 mg tab	180	30	Jordan, Shirley SOLITARIO	[id number]	Insurance	[facility]   12/30/2021	12/30/2021	oxycodone hcl (ir) 30 mg tab	180	30	BeatriceDarrion farnsworth	[id number]	Insurance	[facility]   12/02/2021	12/02/2021	oxycodone hcl (ir) 30 mg tab	180	30	Jordan, Shirley SOLITARIO	[id number]	Insurance	[facility]   11/04/2021	11/05/2021	oxycodone hcl 30 mg tablet	180	30	Jordan, Shirley SOLITARIO	[id number]	Insurance	[facility]   10/07/2021	10/07/2021	oxycodone hcl 30 mg tablet	180	30	Jordan, Shirley SOLITARIO	[id number]	Insurance	[facility]   08/12/2021	08/12/2021	oxycodone hcl 30 mg tablet	180	30	Jordan, Shirley SOLITARIO	[id number]	Insurance	[facility]   07/15/2021	07/15/2021	oxycodone hcl 30 mg tablet	180	30	Jordan, CeeCourtney MD	[id number]	Insurance	[facility]   05/19/2021	05/19/2021	oxycodone hcl 30 mg tablet	180	30	Shirley Jordan MD	[id number]	Insurance	[facility]

## 2022-03-17 NOTE — PROGRESS NOTE ADULT - PROBLEM SELECTOR PROBLEM 5
Appendicitis
Opioid dependence
Appendicitis
Opioid dependence
Prophylactic measure
Appendicitis
Opioid dependence
Opioid dependence

## 2022-03-17 NOTE — PROGRESS NOTE ADULT - ASSESSMENT
TOLU VARGAS is a 41y Male who presents with a chief complaint of sickle cell crisis.    Sickle Cell Disease  - Patient with baseline hemoglobin of 5-6.  - Transfuse as needed for symptomatic anemia. Maintain hemoglobin above 5.  - Hemoglobin adequate today, no PRBC  - Continue pain management, controlled, eval appreciated    Nausea/Vomiting  - Possibly secondary to cannabis use.   - Gastroenterology following. Continue to monitor symptoms. No abd pain or sx at this time    hyperbili -- GI eval noted, suspect due to cirrhosis, iron overload  -- outpt hepatology f/u, will need repeat EGD and u/s in 6 mo    Will continue to follow. OK to dc today from heme/onc standpoint.  On discharge, to follow-up with Dr. Shirley Jordan at [facility] (Phone: [phone]). He states that he has an appt later this month.

## 2022-03-17 NOTE — DISCHARGE NOTE NURSING/CASE MANAGEMENT/SOCIAL WORK - PATIENT PORTAL LINK FT
You can access the FollowMyHealth Patient Portal offered by [facility] by registering at the following website: [email or web address]. By joining [facility]’s FollowMyHealth portal, you will also be able to view your health information using other applications (apps) compatible with our system.

## 2022-03-17 NOTE — PROGRESS NOTE ADULT - PROBLEM SELECTOR PROBLEM 3
Abdominal pain
Hyperbilirubinemia
Hyperbilirubinemia
Appendicitis
Hyperbilirubinemia
Abdominal pain

## 2022-03-17 NOTE — PROGRESS NOTE ADULT - SUBJECTIVE AND OBJECTIVE BOX
Source of information: TOLU VARGAS, Chart review  Patient language: English  : n/a    HPI:  Patient is a 42 y/o with gout and SCD recently hospitalized 2/24-2/26, 2/28-3/7 for pain crisis presenting with 5 hours of new pain, all over, primarily in abdomen and bilious vomiting. Patient reports he was feeling okay since discharge a few days ago until today at noon when he began having pain and took oxy. The pain progressed and he began vomiting and having diarrhea. Pt reports that he feels cold. Pt reports pain all over his body. (11 Mar 2022 01:54)    Pt diagnosed with sickle cell crisis. Retic percent 15.9. Bilirubin 7.9. CTAP demonstrates Hepatic morphology is nonspecific but can be seen with underlying   cirrhosis. Enlarged upper abdominal lymph nodes are nonspecific and can be seen in the setting of portal hypertension.  MRI abdomen 3/14 demonstrates- Abnormal liver signal as detailed above, likely due to iron overload from prior multiple packed red blood cell transfusions. Slight nodular contour of the liver may represent cirrhosis. Clinical correlation is recommended. No suspicious focal hepatic lesion is identified. Common bile duct measures up to 3 mm in caliber which is within normal   limits. No evidence for choledocholithiasis. Small ascites. Mild bilateral pleural effusions.    Pain consulted for sickle cell pain. Pt received 1 unit PRBC 3/16 AM, h/h increased to 6.5/18.2 3/17. Pt seen and examined at bedside. Pt reports back pain greatest over low back which has improved compared to yesterday. Pt rates pain 7/10. Describes pain as aching, intermittently sharp, non- radiating, not alleviated by current pain medications, exacerbated by movement. Pt tolerating PO diet. Pt denies chest pain, SOB, nausea, vomiting, constipation and itchiness. Reports lethargy and dark urine. Last BM 3/17. Patient stated goal for pain control: to be able to take deep breaths, get out of bed to chair and ambulate with tolerable pain control. Pt reports ambulating throughout the day. Pt on chronic opioids at home oxycodone 30mg q4-6h PRN. Plan for discharge home today.     PAST MEDICAL & SURGICAL HISTORY:  Sickle cell anemia    Gout    History of cholecystectomy        FAMILY HISTORY:  Family history of sickle cell trait (Father, Mother)        Social History:  Patient is from home, non smoker or alcoholic hx (11 Mar 2022 01:54)   [X ]Denies ETOH use and smoking   [X] + marijuana smoking     Allergies    ceftriaxone (Anaphylaxis)  hydroxyurea (Other)  penicillin (Pruritus)  piperacillin-tazobactam (Urticaria)      MEDICATIONS  (STANDING):  colchicine 0.6 milliGRAM(s) Oral daily  folic acid 1 milliGRAM(s) Oral <User Schedule>  heparin   Injectable 5000 Unit(s) SubCutaneous every 8 hours  influenza   Vaccine 0.5 milliLiter(s) IntraMuscular once  pantoprazole    Tablet 40 milliGRAM(s) Oral two times a day  senna 2 Tablet(s) Oral at bedtime  sodium chloride 0.9%. 1000 milliLiter(s) (125 mL/Hr) IV Continuous <Continuous>    MEDICATIONS  (PRN):  acetaminophen     Tablet .. 650 milliGRAM(s) Oral every 6 hours PRN Temp greater or equal to 38C (100.4F), Mild Pain (1 - 3)  HYDROmorphone  Injectable 2 milliGRAM(s) IV Push every 3 hours PRN Severe Pain (7 - 10)      Vital Signs Last 24 Hrs  T(C): 36.7 (17 Mar 2022 04:36), Max: 36.9 (16 Mar 2022 20:44)  T(F): 98.1 (17 Mar 2022 04:36), Max: 98.4 (16 Mar 2022 20:44)  HR: 75 (17 Mar 2022 04:36) (75 - 84)  BP: 149/84 (17 Mar 2022 04:36) (149/84 - 155/87)  BP(mean): --  RR: 18 (17 Mar 2022 04:36) (16 - 18)  SpO2: 97% (17 Mar 2022 04:36) (93% - 97%)  COVID-19 PCR: NotDetec (17 Mar 2022 06:25)  COVID-19 PCR: NotDetec (10 Mar 2022 19:07)  COVID-19 PCR: NotDetec (07 Mar 2022 07:08)  COVID-19 PCR: NotDetec (28 Feb 2022 11:17)  COVID-19 PCR: NotDetec (25 Feb 2022 00:43)  SARS-CoV-2: NotDetec (28 Oct 2021 05:25)  COVID-19 PCR: NotDetec (28 Oct 2021 03:35)  COVID-19 PCR: NotDetec (07 Oct 2021 18:02)    LABS: Reviewed                          6.5    13.23 )-----------( 318      ( 17 Mar 2022 06:52 )             18.2     03-17    141  |  114<H>  |  6<L>  ----------------------------<  91  3.4<L>   |  22  |  0.66    Ca    8.1<L>      17 Mar 2022 06:52  Phos  4.5     03-17  Mg     1.5     03-17    TPro  6.5  /  Alb  3.0<L>  /  TBili  6.9<H>  /  DBili  3.6<H>  /  AST  106<H>  /  ALT  80<H>  /  AlkPhos  102  03-16      LIVER FUNCTIONS - ( 16 Mar 2022 08:19 )  Alb: 3.0 g/dL / Pro: 6.5 g/dL / ALK PHOS: 102 U/L / ALT: 80 U/L DA / AST: 106 U/L / GGT: x           Radiology: Reviewed  < from: MR Abdomen w/wo IV Cont (03.14.22 @ 19:17) >  ACC: [id number] EXAM:  MR ABDOMEN WAW IC                          PROCEDURE DATE:  03/14/2022          INTERPRETATION:  CLINICAL INFORMATION: Hyperbilirubinemia. Evaluation for   biliary system. History of sickle cell disease. Multiple packed red blood   cell transfusions in the past.    COMPARISON: No prior abdominal MR is available for comparison. Reference   is made with a previous abdominal CT dated 3/10/2022    CONTRAST/COMPLICATIONS:  IV Contrast: Gadavist  8.5 cc administered   1.5 cc discarded  Oral Contrast: NONE  Complications: None reported at time of study completion    PROCEDURE:  MRI of the abdomen was performed.  MRCP was performed.    FINDINGS:  LOWER CHEST: Mild bilateral pleural effusions, right greater than left.    LIVER: The liver appears diffusely dark on T2-weighted images. In   addition, there is signal attenuation of the liver on in-phase   T1-weighted gradient echo images. Findings likely due to iron overload   from prior multiple packed red blood cell transfusions. Slight nodular   contour of the liver may represent cirrhosis. Clinical correlation is   recommended. No suspicious focal hepatic lesion is identified.    BILE DUCTS: Normal caliber. Common bile duct measures up to 3 mm in   caliber which is within normal limits. No evidence for choledocholithiasis  GALLBLADDER: Status post cholecystectomy.  SPLEEN: Atrophic and diffusely calcified, likely due to autoinfarction   from patient's known sickle cell disease.  PANCREAS: Within normal limits.  ADRENALS: Within normal limits.  KIDNEYS/URETERS: A few small bilateral renal cysts. One of the left renal   cysts is hemorrhagic or proteinaceous.    VISUALIZED PORTIONS:  BOWEL: Within normal limits.  PERITONEUM: Small ascites.  VESSELS: The portal veinsand hepatic veins are patent.  RETROPERITONEUM/LYMPH NODES: No lymphadenopathy.  ABDOMINAL WALL: Within normal limits.  BONES: Within normal limits.    IMPRESSION:  Abnormal liver signal as detailed above, likely due to iron overload from   prior multiple packed red blood cell transfusions. Slight nodular contour   of the liver may represent cirrhosis. Clinical correlation is   recommended. No suspicious focal hepatic lesion is identified.    Common bile duct measures up to 3 mm in caliber which is within normal   limits. No evidence for choledocholithiasis.    Small ascites.    Mild bilateral pleural effusions.    --- End of Report ---            RONNY ORTIZ MD; Attending Radiologist  This document has been electronically signed. Mar 15 2022  9:47AM    < end of copied text >      < from: Xray Chest 1 View-PORTABLE IMMEDIATE (Xray Chest 1 View-PORTABLE IMMEDIATE .) (03.10.22 @ 22:08) >    ACC: [id number] EXAM:  XR CHEST PORTABLE IMMED 1V                          PROCEDURE DATE:  03/10/2022          INTERPRETATION:  Portable chest radiograph    CLINICAL INFORMATION: Bilious vomiting.    TECHNIQUE:  Portable  AP chest radiograph.    COMPARISON: 10/28/2021 .    FINDINGS:  CATHETERS AND TUBES: Mediport catheter tip in SVC.    PULMONARY: The visualized lungs are clear of airspace consolidations or   effusions.   No pneumothorax.    HEART/VASCULAR: The  heart is enlarged in transverse diameter. .    BONES: Visualized osseous structures are intact.    IMPRESSION:   No radiographic evidence of active chest disease.    --- End of Report ---            RAMO MORALES MD; Attending Radiologist  This document has been electronically signed. Mar 11 2022  1:24PM    < end of copied text >    < from: CT Abdomen and Pelvis w/ IV Cont (03.10.22 @ 20:17) >    ACC: [id number] EXAM:  CT ABDOMEN AND PELVIS IC                          PROCEDURE DATE:  03/10/2022          INTERPRETATION:  CLINICAL INFORMATION: Right upper quadrant pain and   vomiting    COMPARISON: CT abdomen pelvis 3/4/2022    CONTRAST/COMPLICATIONS:  IV Contrast: Omnipaque 350  90 cc administered   10 cc discarded  Oral Contrast: NONE  Complications: None reported at time of study completion    PROCEDURE:  CT of the Abdomen and Pelvis was performed.  Sagittal and coronal reformats were performed.    FINDINGS:  LOWER CHEST: Cardiomegaly. Bibasilar linear type atelectasis.    LIVER: Prominent hepatorenal notching and hypertrophy lateral segment of   the left hepatic lobe. Findings can be seen with cirrhosis. Periportal   edema.  BILE DUCTS: Normal caliber.  GALLBLADDER: Cholecystectomy.  SPLEEN: Jason infarcted calcified spleen.  PANCREAS: Within normal limits.  ADRENALS: Within normal limits.  KIDNEYS/URETERS: Left renal cyst. Symmetric enhancement. Partial left   renal duplication. No hydronephrosis.    BLADDER: Within normal limits.  REPRODUCTIVE ORGANS: Prostate within normal limits.    BOWEL: No bowel obstruction. Appendix is normal.  PERITONEUM: No ascites.  VESSELS: Within normal limits.  RETROPERITONEUM/LYMPH NODES: Stable enlarged upper abdominal lymph nodes   in the gastrohepatic, periportal, and portacaval regions, nonspecific.  ABDOMINAL WALL: Small fat-containing left inguinal hernia.  BONES: Stable moderate T12 and mild to moderate L1 compression   deformities..    IMPRESSION:  No acute abdominal pathology.    Hepatic morphology is nonspecific but can be seen with underlying   cirrhosis.    Enlarged upper abdominal lymph nodes are nonspecific and can be seen in   the setting of portal hypertension.        --- End of Report ---            DIAZ URIOSTEGUI MD; Attending Radiologist  This document has been electronically signed. Mar 10 2022  8:29PM    < end of copied text >      ORT Score -   Family Hx of substance abuse	Female	      Male  Alcohol 	                                           1                     3  Illegal drugs	                                   2                     3  Rx drugs                                           4 	                  4  Personal Hx of substance abuse		  Alcohol 	                                          3	                  3  Illegal drugs                                     4	                  4  Rx drugs                                            5 	                  5  Age between 16- 45 years	           1                     1  hx preadolescent sexual abuse	   3 	                  0  Psychological disease		  ADD, OCD, bipolar, schizophrenia   2	          2  Depression                                           1 	          1  Total: 5    a score of 3 or lower indicates low risk for opioid abuse		  a score of 4-7 indicates moderate risk for opioid abuse		  a score of 8 or higher indicates high risk for opioid abuse    REVIEW OF SYSTEMS:  CONSTITUTIONAL: No fever + fatigue  RESPIRATORY: No cough, wheezing, chills or hemoptysis; No shortness of breath  CARDIOVASCULAR: No chest pain, palpitations, dizziness, or leg swelling  GASTROINTESTINAL: No loss of appetite, decreased PO intake. + epigastric pain. No nausea, vomiting; No diarrhea or constipation.   GENITOURINARY: No dysuria, frequency, hematuria, retention or incontinence +dark urine  MUSCULOSKELETAL: + lumbar back pain, no swelling; no upper or lower motor strength weakness, no saddle anesthesia, bowel/bladder incontinence, no falls   NEURO: No headaches, No numbness/tingling b/l LE, No weakness    PHYSICAL EXAM:  GENERAL:  Faigued & Oriented X4, cooperative, NAD, Speech is clear.   RESPIRATORY: Respirations even and unlabored. Clear to auscultation bilaterally; No rales, rhonchi, wheezing, or rubs  CARDIOVASCULAR: + right chest wall mediport c/d/i; Normal S1/S2, regular rate and rhythm; No murmurs, rubs, or gallops. No JVD.   GASTROINTESTINAL:  Soft, + epigastric tenderness, Nondistended; Bowel sounds present  PERIPHERAL VASCULAR:  Extremities warm without edema. 2+ Peripheral Pulses, No cyanosis, No calf tenderness  MUSCULOSKELETAL: Motor Strength 5/5 B/L upper and lower extremities; moves all extremities equally against gravity; ROM intact; negative SLR; + lumbar back tenderness  SKIN: + icterus + generalized jaundice; + multiple tattoos site c/d/i without erythema, edema. Warm, dry, intact. No rashes, lesions, scars or wounds.     Risk factors associated with adverse outcomes related to opioid treatment  [ ]  Concurrent benzodiazepine use  [X ]  History/ Active substance use or alcohol use disorder  [ ] Psychiatric co-morbidity  [ ] Sleep apnea  [ ] COPD  [ ] BMI> 35  [X ] Liver dysfunction  [ ] Renal dysfunction  [ ] CHF  [X ] Smoker- marijuana   [ ]  Age > 60 years    [X ]  [facility]  Reviewed and Copied to Chart. See below.    Plan of care and goal oriented pain management treatment options were discussed with patient and /or primary care giver; all questions and concerns were addressed and care was aligned with patient's wishes.    Educated patient on goal oriented pain management treatment options     03-17-22 @ 09:56

## 2022-03-17 NOTE — PROGRESS NOTE ADULT - PROVIDER SPECIALTY LIST ADULT
Heme/Onc
Hospitalist
Internal Medicine
Hospitalist
Internal Medicine
Surgery
Heme/Onc
Internal Medicine
Pain Medicine
Gastroenterology
Gastroenterology
Pain Medicine
Internal Medicine

## 2022-03-17 NOTE — PROGRESS NOTE ADULT - PROBLEM SELECTOR PROBLEM 1
Sickle cell crisis
Sickle cell crisis
Abdominal pain
Abdominal pain
Sickle cell crisis

## 2022-03-17 NOTE — PROGRESS NOTE ADULT - PROBLEM SELECTOR PROBLEM 2
Transaminitis
Abdominal pain
Cannabinoid hyperemesis syndrome
Cannabinoid hyperemesis syndrome
Transaminitis
Anemia
Cannabinoid hyperemesis syndrome

## 2022-03-17 NOTE — DISCHARGE NOTE NURSING/CASE MANAGEMENT/SOCIAL WORK - NSDCPEFALRISK_GEN_ALL_CORE
For information on Fall & Injury Prevention, visit: [email or web address] OR  [email or web address] OR  https://www.cdc.gov/steadi/patient.html

## 2022-03-17 NOTE — PROGRESS NOTE ADULT - PROBLEM SELECTOR PROBLEM 4
Chronic back pain
Chronic back pain
Abdominal pain
Abdominal pain
Chronic back pain
Anemia
Chronic back pain
Abdominal pain

## 2022-05-31 ENCOUNTER — INPATIENT (INPATIENT)
Facility: HOSPITAL | Age: 42
LOS: 1 days | Discharge: HOME | DRG: 812 | End: 2022-06-02
Attending: INTERNAL MEDICINE | Admitting: INTERNAL MEDICINE
Payer: MEDICAID

## 2022-05-31 VITALS
TEMPERATURE: 99 F | RESPIRATION RATE: 18 BRPM | HEIGHT: 69 IN | DIASTOLIC BLOOD PRESSURE: 69 MMHG | OXYGEN SATURATION: 90 % | SYSTOLIC BLOOD PRESSURE: 139 MMHG | WEIGHT: 187.39 LBS | HEART RATE: 103 BPM

## 2022-05-31 DIAGNOSIS — M10.9 GOUT, UNSPECIFIED: ICD-10-CM

## 2022-05-31 DIAGNOSIS — N17.9 ACUTE KIDNEY FAILURE, UNSPECIFIED: ICD-10-CM

## 2022-05-31 DIAGNOSIS — Z29.9 ENCOUNTER FOR PROPHYLACTIC MEASURES, UNSPECIFIED: ICD-10-CM

## 2022-05-31 DIAGNOSIS — Z90.49 ACQUIRED ABSENCE OF OTHER SPECIFIED PARTS OF DIGESTIVE TRACT: Chronic | ICD-10-CM

## 2022-05-31 DIAGNOSIS — D57.00 HB-SS DISEASE WITH CRISIS, UNSPECIFIED: ICD-10-CM

## 2022-05-31 DIAGNOSIS — F11.20 OPIOID DEPENDENCE, UNCOMPLICATED: ICD-10-CM

## 2022-05-31 DIAGNOSIS — K74.60 UNSPECIFIED CIRRHOSIS OF LIVER: ICD-10-CM

## 2022-05-31 LAB
ALBUMIN SERPL ELPH-MCNC: 3.4 G/DL — LOW (ref 3.5–5)
ALBUMIN SERPL ELPH-MCNC: 3.4 G/DL — LOW (ref 3.5–5)
ALP SERPL-CCNC: 133 U/L — HIGH (ref 40–120)
ALP SERPL-CCNC: 133 U/L — HIGH (ref 40–120)
ALT FLD-CCNC: 54 U/L DA — SIGNIFICANT CHANGE UP (ref 10–60)
ALT FLD-CCNC: 56 U/L DA — SIGNIFICANT CHANGE UP (ref 10–60)
ANION GAP SERPL CALC-SCNC: 5 MMOL/L — SIGNIFICANT CHANGE UP (ref 5–17)
ANION GAP SERPL CALC-SCNC: 8 MMOL/L — SIGNIFICANT CHANGE UP (ref 5–17)
APTT BLD: 27.2 SEC — LOW (ref 27.5–35.5)
AST SERPL-CCNC: 110 U/L — HIGH (ref 10–40)
AST SERPL-CCNC: 111 U/L — HIGH (ref 10–40)
BASOPHILS # BLD AUTO: 0 K/UL — SIGNIFICANT CHANGE UP (ref 0–0.2)
BASOPHILS # BLD AUTO: 0.11 K/UL — SIGNIFICANT CHANGE UP (ref 0–0.2)
BASOPHILS # BLD AUTO: 0.12 K/UL — SIGNIFICANT CHANGE UP (ref 0–0.2)
BASOPHILS NFR BLD AUTO: 0 % — SIGNIFICANT CHANGE UP (ref 0–2)
BASOPHILS NFR BLD AUTO: 0.9 % — SIGNIFICANT CHANGE UP (ref 0–2)
BASOPHILS NFR BLD AUTO: 1.4 % — SIGNIFICANT CHANGE UP (ref 0–2)
BILIRUB DIRECT SERPL-MCNC: 2.2 MG/DL — HIGH (ref 0–0.3)
BILIRUB DIRECT SERPL-MCNC: 2.4 MG/DL — HIGH (ref 0–0.3)
BILIRUB INDIRECT FLD-MCNC: 3.7 MG/DL — HIGH (ref 0.2–1)
BILIRUB INDIRECT FLD-MCNC: 4 MG/DL — HIGH (ref 0.2–1)
BILIRUB SERPL-MCNC: 6.1 MG/DL — HIGH (ref 0.2–1.2)
BILIRUB SERPL-MCNC: 6.1 MG/DL — HIGH (ref 0.2–1.2)
BILIRUB SERPL-MCNC: 6.2 MG/DL — HIGH (ref 0.2–1.2)
BILIRUB SERPL-MCNC: 6.2 MG/DL — HIGH (ref 0.2–1.2)
BUN SERPL-MCNC: 27 MG/DL — HIGH (ref 7–18)
BUN SERPL-MCNC: 33 MG/DL — HIGH (ref 7–18)
CALCIUM SERPL-MCNC: 8.8 MG/DL — SIGNIFICANT CHANGE UP (ref 8.4–10.5)
CALCIUM SERPL-MCNC: 8.8 MG/DL — SIGNIFICANT CHANGE UP (ref 8.4–10.5)
CHLORIDE SERPL-SCNC: 108 MMOL/L — SIGNIFICANT CHANGE UP (ref 96–108)
CHLORIDE SERPL-SCNC: 111 MMOL/L — HIGH (ref 96–108)
CO2 SERPL-SCNC: 22 MMOL/L — SIGNIFICANT CHANGE UP (ref 22–31)
CO2 SERPL-SCNC: 22 MMOL/L — SIGNIFICANT CHANGE UP (ref 22–31)
CREAT SERPL-MCNC: 1.04 MG/DL — SIGNIFICANT CHANGE UP (ref 0.5–1.3)
CREAT SERPL-MCNC: 1.36 MG/DL — HIGH (ref 0.5–1.3)
EGFR: 66 ML/MIN/1.73M2 — SIGNIFICANT CHANGE UP
EGFR: 91 ML/MIN/1.73M2 — SIGNIFICANT CHANGE UP
EOSINOPHIL # BLD AUTO: 0 K/UL — SIGNIFICANT CHANGE UP (ref 0–0.5)
EOSINOPHIL # BLD AUTO: 0.17 K/UL — SIGNIFICANT CHANGE UP (ref 0–0.5)
EOSINOPHIL # BLD AUTO: 0.32 K/UL — SIGNIFICANT CHANGE UP (ref 0–0.5)
EOSINOPHIL NFR BLD AUTO: 0 % — SIGNIFICANT CHANGE UP (ref 0–6)
EOSINOPHIL NFR BLD AUTO: 1.4 % — SIGNIFICANT CHANGE UP (ref 0–6)
EOSINOPHIL NFR BLD AUTO: 3.7 % — SIGNIFICANT CHANGE UP (ref 0–6)
FERRITIN SERPL-MCNC: 2647 NG/ML — HIGH (ref 30–400)
GLUCOSE SERPL-MCNC: 105 MG/DL — HIGH (ref 70–99)
GLUCOSE SERPL-MCNC: 91 MG/DL — SIGNIFICANT CHANGE UP (ref 70–99)
HAPTOGLOB SERPL-MCNC: <20 MG/DL — LOW (ref 34–200)
HCT VFR BLD CALC: 14.5 % — CRITICAL LOW (ref 39–50)
HCT VFR BLD CALC: 14.9 % — CRITICAL LOW (ref 39–50)
HCT VFR BLD CALC: 17.1 % — CRITICAL LOW (ref 39–50)
HGB BLD-MCNC: 5.3 G/DL — CRITICAL LOW (ref 13–17)
HGB BLD-MCNC: 5.3 G/DL — CRITICAL LOW (ref 13–17)
HGB BLD-MCNC: 6.1 G/DL — CRITICAL LOW (ref 13–17)
IMM GRANULOCYTES NFR BLD AUTO: 0.5 % — SIGNIFICANT CHANGE UP (ref 0–1.5)
IMM GRANULOCYTES NFR BLD AUTO: 0.5 % — SIGNIFICANT CHANGE UP (ref 0–1.5)
INR BLD: 1.08 RATIO — SIGNIFICANT CHANGE UP (ref 0.88–1.16)
IRON SATN MFR SERPL: 228 UG/DL — HIGH (ref 65–170)
IRON SATN MFR SERPL: 87 % — HIGH (ref 20–55)
LDH SERPL L TO P-CCNC: 899 U/L — HIGH (ref 120–225)
LDH SERPL L TO P-CCNC: 923 U/L — HIGH (ref 120–225)
LYMPHOCYTES # BLD AUTO: 28 % — SIGNIFICANT CHANGE UP (ref 13–44)
LYMPHOCYTES # BLD AUTO: 3.37 K/UL — HIGH (ref 1–3.3)
LYMPHOCYTES # BLD AUTO: 3.58 K/UL — HIGH (ref 1–3.3)
LYMPHOCYTES # BLD AUTO: 38.5 % — SIGNIFICANT CHANGE UP (ref 13–44)
LYMPHOCYTES # BLD AUTO: 38.8 % — SIGNIFICANT CHANGE UP (ref 13–44)
LYMPHOCYTES # BLD AUTO: 4.65 K/UL — HIGH (ref 1–3.3)
MAGNESIUM SERPL-MCNC: 2.1 MG/DL — SIGNIFICANT CHANGE UP (ref 1.6–2.6)
MCHC RBC-ENTMCNC: 32.3 PG — SIGNIFICANT CHANGE UP (ref 27–34)
MCHC RBC-ENTMCNC: 33.1 PG — SIGNIFICANT CHANGE UP (ref 27–34)
MCHC RBC-ENTMCNC: 33.3 PG — SIGNIFICANT CHANGE UP (ref 27–34)
MCHC RBC-ENTMCNC: 35.6 GM/DL — SIGNIFICANT CHANGE UP (ref 32–36)
MCHC RBC-ENTMCNC: 35.7 GM/DL — SIGNIFICANT CHANGE UP (ref 32–36)
MCHC RBC-ENTMCNC: 36.6 GM/DL — HIGH (ref 32–36)
MCV RBC AUTO: 90.5 FL — SIGNIFICANT CHANGE UP (ref 80–100)
MCV RBC AUTO: 91.2 FL — SIGNIFICANT CHANGE UP (ref 80–100)
MCV RBC AUTO: 93.1 FL — SIGNIFICANT CHANGE UP (ref 80–100)
MONOCYTES # BLD AUTO: 1.3 K/UL — HIGH (ref 0–0.9)
MONOCYTES # BLD AUTO: 1.53 K/UL — HIGH (ref 0–0.9)
MONOCYTES # BLD AUTO: 1.6 K/UL — HIGH (ref 0–0.9)
MONOCYTES NFR BLD AUTO: 12 % — SIGNIFICANT CHANGE UP (ref 2–14)
MONOCYTES NFR BLD AUTO: 13.4 % — SIGNIFICANT CHANGE UP (ref 2–14)
MONOCYTES NFR BLD AUTO: 14.9 % — HIGH (ref 2–14)
NEUTROPHILS # BLD AUTO: 3.6 K/UL — SIGNIFICANT CHANGE UP (ref 1.8–7.4)
NEUTROPHILS # BLD AUTO: 5.38 K/UL — SIGNIFICANT CHANGE UP (ref 1.8–7.4)
NEUTROPHILS # BLD AUTO: 7.67 K/UL — HIGH (ref 1.8–7.4)
NEUTROPHILS NFR BLD AUTO: 41 % — LOW (ref 43–77)
NEUTROPHILS NFR BLD AUTO: 45 % — SIGNIFICANT CHANGE UP (ref 43–77)
NEUTROPHILS NFR BLD AUTO: 60 % — SIGNIFICANT CHANGE UP (ref 43–77)
NRBC # BLD: 0 /100 WBCS — SIGNIFICANT CHANGE UP (ref 0–0)
NRBC # BLD: 0 /100 WBCS — SIGNIFICANT CHANGE UP (ref 0–0)
PHOSPHATE SERPL-MCNC: 3.3 MG/DL — SIGNIFICANT CHANGE UP (ref 2.5–4.5)
PLATELET # BLD AUTO: 259 K/UL — SIGNIFICANT CHANGE UP (ref 150–400)
PLATELET # BLD AUTO: 282 K/UL — SIGNIFICANT CHANGE UP (ref 150–400)
PLATELET # BLD AUTO: 320 K/UL — SIGNIFICANT CHANGE UP (ref 150–400)
POTASSIUM SERPL-MCNC: 4.3 MMOL/L — SIGNIFICANT CHANGE UP (ref 3.5–5.3)
POTASSIUM SERPL-MCNC: 4.3 MMOL/L — SIGNIFICANT CHANGE UP (ref 3.5–5.3)
POTASSIUM SERPL-SCNC: 4.3 MMOL/L — SIGNIFICANT CHANGE UP (ref 3.5–5.3)
POTASSIUM SERPL-SCNC: 4.3 MMOL/L — SIGNIFICANT CHANGE UP (ref 3.5–5.3)
PROT SERPL-MCNC: 7.4 G/DL — SIGNIFICANT CHANGE UP (ref 6–8.3)
PROT SERPL-MCNC: 7.6 G/DL — SIGNIFICANT CHANGE UP (ref 6–8.3)
PROTHROM AB SERPL-ACNC: 12.9 SEC — SIGNIFICANT CHANGE UP (ref 10.5–13.4)
RBC # BLD: 1.59 M/UL — LOW (ref 4.2–5.8)
RBC # BLD: 1.59 M/UL — LOW (ref 4.2–5.8)
RBC # BLD: 1.6 M/UL — LOW (ref 4.2–5.8)
RBC # BLD: 1.6 M/UL — LOW (ref 4.2–5.8)
RBC # BLD: 1.89 M/UL — LOW (ref 4.2–5.8)
RBC # FLD: 20.5 % — HIGH (ref 10.3–14.5)
RBC # FLD: 23 % — HIGH (ref 10.3–14.5)
RBC # FLD: 23.9 % — HIGH (ref 10.3–14.5)
RETICS #: 320.8 K/UL — HIGH (ref 25–125)
RETICS #: SIGNIFICANT CHANGE UP K/UL (ref 25–125)
RETICS/RBC NFR: 20.6 % — HIGH (ref 0.5–2.5)
RETICS/RBC NFR: SIGNIFICANT CHANGE UP (ref 0.5–2.5)
SARS-COV-2 RNA SPEC QL NAA+PROBE: SIGNIFICANT CHANGE UP
SODIUM SERPL-SCNC: 138 MMOL/L — SIGNIFICANT CHANGE UP (ref 135–145)
SODIUM SERPL-SCNC: 138 MMOL/L — SIGNIFICANT CHANGE UP (ref 135–145)
TIBC SERPL-MCNC: 262 UG/DL — SIGNIFICANT CHANGE UP (ref 250–450)
TROPONIN I, HIGH SENSITIVITY RESULT: 10.2 NG/L — SIGNIFICANT CHANGE UP
TROPONIN I, HIGH SENSITIVITY RESULT: 8.9 NG/L — SIGNIFICANT CHANGE UP
UIBC SERPL-MCNC: 34 UG/DL — LOW (ref 110–370)
WBC # BLD: 11.97 K/UL — HIGH (ref 3.8–10.5)
WBC # BLD: 12.78 K/UL — HIGH (ref 3.8–10.5)
WBC # BLD: 8.75 K/UL — SIGNIFICANT CHANGE UP (ref 3.8–10.5)
WBC # FLD AUTO: 11.97 K/UL — HIGH (ref 3.8–10.5)
WBC # FLD AUTO: 12.78 K/UL — HIGH (ref 3.8–10.5)
WBC # FLD AUTO: 8.75 K/UL — SIGNIFICANT CHANGE UP (ref 3.8–10.5)

## 2022-05-31 PROCEDURE — 71250 CT THORAX DX C-: CPT | Mod: 26,MA

## 2022-05-31 PROCEDURE — 73110 X-RAY EXAM OF WRIST: CPT | Mod: 26,RT

## 2022-05-31 PROCEDURE — 73590 X-RAY EXAM OF LOWER LEG: CPT | Mod: 26,LT,RT

## 2022-05-31 PROCEDURE — 99285 EMERGENCY DEPT VISIT HI MDM: CPT

## 2022-05-31 PROCEDURE — 73620 X-RAY EXAM OF FOOT: CPT | Mod: 26,LT

## 2022-05-31 PROCEDURE — 74176 CT ABD & PELVIS W/O CONTRAST: CPT | Mod: 26,MA

## 2022-05-31 RX ORDER — HYDROMORPHONE HYDROCHLORIDE 2 MG/ML
2 INJECTION INTRAMUSCULAR; INTRAVENOUS; SUBCUTANEOUS ONCE
Refills: 0 | Status: DISCONTINUED | OUTPATIENT
Start: 2022-05-31 | End: 2022-05-31

## 2022-05-31 RX ORDER — ACETAMINOPHEN 500 MG
650 TABLET ORAL ONCE
Refills: 0 | Status: COMPLETED | OUTPATIENT
Start: 2022-05-31 | End: 2022-05-31

## 2022-05-31 RX ORDER — OXYCODONE HYDROCHLORIDE 5 MG/1
30 TABLET ORAL ONCE
Refills: 0 | Status: DISCONTINUED | OUTPATIENT
Start: 2022-05-31 | End: 2022-05-31

## 2022-05-31 RX ORDER — ONDANSETRON 8 MG/1
4 TABLET, FILM COATED ORAL EVERY 8 HOURS
Refills: 0 | Status: DISCONTINUED | OUTPATIENT
Start: 2022-05-31 | End: 2022-06-02

## 2022-05-31 RX ORDER — DIPHENHYDRAMINE HCL 50 MG
25 CAPSULE ORAL ONCE
Refills: 0 | Status: COMPLETED | OUTPATIENT
Start: 2022-05-31 | End: 2022-05-31

## 2022-05-31 RX ORDER — MORPHINE SULFATE 50 MG/1
4 CAPSULE, EXTENDED RELEASE ORAL ONCE
Refills: 0 | Status: DISCONTINUED | OUTPATIENT
Start: 2022-05-31 | End: 2022-05-31

## 2022-05-31 RX ORDER — SODIUM CHLORIDE 9 MG/ML
1000 INJECTION, SOLUTION INTRAVENOUS
Refills: 0 | Status: ACTIVE | OUTPATIENT
Start: 2022-05-31 | End: 2022-05-31

## 2022-05-31 RX ORDER — LANOLIN ALCOHOL/MO/W.PET/CERES
3 CREAM (GRAM) TOPICAL AT BEDTIME
Refills: 0 | Status: DISCONTINUED | OUTPATIENT
Start: 2022-05-31 | End: 2022-06-02

## 2022-05-31 RX ORDER — SENNA PLUS 8.6 MG/1
2 TABLET ORAL AT BEDTIME
Refills: 0 | Status: DISCONTINUED | OUTPATIENT
Start: 2022-05-31 | End: 2022-06-02

## 2022-05-31 RX ORDER — FOLIC ACID 0.8 MG
1 TABLET ORAL
Refills: 0 | Status: DISCONTINUED | OUTPATIENT
Start: 2022-05-31 | End: 2022-06-02

## 2022-05-31 RX ORDER — POLYETHYLENE GLYCOL 3350 17 G/17G
17 POWDER, FOR SOLUTION ORAL DAILY
Refills: 0 | Status: DISCONTINUED | OUTPATIENT
Start: 2022-05-31 | End: 2022-06-02

## 2022-05-31 RX ORDER — HYDROMORPHONE HYDROCHLORIDE 2 MG/ML
2 INJECTION INTRAMUSCULAR; INTRAVENOUS; SUBCUTANEOUS
Refills: 0 | Status: DISCONTINUED | OUTPATIENT
Start: 2022-05-31 | End: 2022-06-01

## 2022-05-31 RX ORDER — COLCHICINE 0.6 MG
0.6 TABLET ORAL DAILY
Refills: 0 | Status: DISCONTINUED | OUTPATIENT
Start: 2022-05-31 | End: 2022-06-02

## 2022-05-31 RX ORDER — ALLOPURINOL 300 MG
100 TABLET ORAL DAILY
Refills: 0 | Status: DISCONTINUED | OUTPATIENT
Start: 2022-05-31 | End: 2022-06-02

## 2022-05-31 RX ORDER — SODIUM CHLORIDE 9 MG/ML
1000 INJECTION INTRAMUSCULAR; INTRAVENOUS; SUBCUTANEOUS ONCE
Refills: 0 | Status: COMPLETED | OUTPATIENT
Start: 2022-05-31 | End: 2022-05-31

## 2022-05-31 RX ADMIN — SODIUM CHLORIDE 1000 MILLILITER(S): 9 INJECTION INTRAMUSCULAR; INTRAVENOUS; SUBCUTANEOUS at 02:28

## 2022-05-31 RX ADMIN — MORPHINE SULFATE 4 MILLIGRAM(S): 50 CAPSULE, EXTENDED RELEASE ORAL at 02:28

## 2022-05-31 RX ADMIN — OXYCODONE HYDROCHLORIDE 30 MILLIGRAM(S): 5 TABLET ORAL at 06:07

## 2022-05-31 RX ADMIN — HYDROMORPHONE HYDROCHLORIDE 2 MILLIGRAM(S): 2 INJECTION INTRAMUSCULAR; INTRAVENOUS; SUBCUTANEOUS at 08:00

## 2022-05-31 RX ADMIN — HYDROMORPHONE HYDROCHLORIDE 2 MILLIGRAM(S): 2 INJECTION INTRAMUSCULAR; INTRAVENOUS; SUBCUTANEOUS at 13:02

## 2022-05-31 RX ADMIN — OXYCODONE HYDROCHLORIDE 30 MILLIGRAM(S): 5 TABLET ORAL at 06:37

## 2022-05-31 RX ADMIN — Medication 25 MILLIGRAM(S): at 13:56

## 2022-05-31 RX ADMIN — Medication 650 MILLIGRAM(S): at 14:36

## 2022-05-31 RX ADMIN — POLYETHYLENE GLYCOL 3350 17 GRAM(S): 17 POWDER, FOR SOLUTION ORAL at 11:34

## 2022-05-31 RX ADMIN — SENNA PLUS 2 TABLET(S): 8.6 TABLET ORAL at 21:21

## 2022-05-31 RX ADMIN — MORPHINE SULFATE 4 MILLIGRAM(S): 50 CAPSULE, EXTENDED RELEASE ORAL at 02:58

## 2022-05-31 RX ADMIN — HYDROMORPHONE HYDROCHLORIDE 2 MILLIGRAM(S): 2 INJECTION INTRAMUSCULAR; INTRAVENOUS; SUBCUTANEOUS at 11:34

## 2022-05-31 RX ADMIN — SODIUM CHLORIDE 70 MILLILITER(S): 9 INJECTION, SOLUTION INTRAVENOUS at 17:43

## 2022-05-31 RX ADMIN — HYDROMORPHONE HYDROCHLORIDE 2 MILLIGRAM(S): 2 INJECTION INTRAMUSCULAR; INTRAVENOUS; SUBCUTANEOUS at 19:00

## 2022-05-31 RX ADMIN — Medication 650 MILLIGRAM(S): at 13:57

## 2022-05-31 RX ADMIN — HYDROMORPHONE HYDROCHLORIDE 2 MILLIGRAM(S): 2 INJECTION INTRAMUSCULAR; INTRAVENOUS; SUBCUTANEOUS at 21:13

## 2022-05-31 RX ADMIN — Medication 100 MILLIGRAM(S): at 11:34

## 2022-05-31 RX ADMIN — Medication 1 MILLIGRAM(S): at 19:13

## 2022-05-31 RX ADMIN — HYDROMORPHONE HYDROCHLORIDE 2 MILLIGRAM(S): 2 INJECTION INTRAMUSCULAR; INTRAVENOUS; SUBCUTANEOUS at 05:09

## 2022-05-31 RX ADMIN — HYDROMORPHONE HYDROCHLORIDE 2 MILLIGRAM(S): 2 INJECTION INTRAMUSCULAR; INTRAVENOUS; SUBCUTANEOUS at 05:39

## 2022-05-31 RX ADMIN — Medication 0.6 MILLIGRAM(S): at 11:35

## 2022-05-31 RX ADMIN — HYDROMORPHONE HYDROCHLORIDE 2 MILLIGRAM(S): 2 INJECTION INTRAMUSCULAR; INTRAVENOUS; SUBCUTANEOUS at 17:43

## 2022-05-31 RX ADMIN — HYDROMORPHONE HYDROCHLORIDE 2 MILLIGRAM(S): 2 INJECTION INTRAMUSCULAR; INTRAVENOUS; SUBCUTANEOUS at 19:53

## 2022-05-31 RX ADMIN — HYDROMORPHONE HYDROCHLORIDE 2 MILLIGRAM(S): 2 INJECTION INTRAMUSCULAR; INTRAVENOUS; SUBCUTANEOUS at 23:00

## 2022-05-31 NOTE — H&P ADULT - NSHPPHYSICALEXAM_GEN_ALL_CORE
ICU Vital Signs Last 24 Hrs  T(C): 36.7 (31 May 2022 07:31), Max: 37.1 (31 May 2022 01:24)  T(F): 98 (31 May 2022 07:31), Max: 98.7 (31 May 2022 01:24)  HR: 71 (31 May 2022 07:31) (71 - 103)  BP: 124/75 (31 May 2022 07:31) (124/75 - 139/69)  RR: 18 (31 May 2022 07:31) (18 - 18)  SpO2: 98% (31 May 2022 07:31) (90% - 98%)      GENERAL: NAD, overweight, sat well on 2L NC, jaundice   HEAD:  Atraumatic, Normocephalic  EYES:  conjunctiva and icteric  NECK: Supple, No JVD, Normal thyroid  CHEST/LUNG: Clear to auscultation. Clear to percussion bilaterally; No rales, rhonchi, wheezing, or rubs  HEART: Regular rate and rhythm; No murmurs, rubs, or gallops  ABDOMEN: Soft, Nontender, Nondistended; Bowel sounds present, no pain or masses on palpation   NERVOUS SYSTEM:  Alert & Oriented X3  EXTREMITIES:  2+ Peripheral Pulses, No clubbing, cyanosis, or edema  : voiding well   SKIN: warm, dry
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
## 2022-05-31 NOTE — H&P ADULT - PROBLEM/PLAN-2
DISPLAY PLAN FREE TEXT Propranolol Counseling:  I discussed with the patient the risks of propranolol including but not limited to low heart rate, low blood pressure, low blood sugar, restlessness and increased cold sensitivity. They should call the office if they experience any of these side effects.

## 2022-05-31 NOTE — CONSULT NOTE ADULT - ASSESSMENT
TOLU VARGAS is a 42y Male who presents with a chief complaint of pain.    Sickle Cell Anemia  - Patient with baseline hemoglobin in the 5 to 7 range.  - Monitor CBC. Would hold off on transfusions unless clinically symptomatic.  - IVF and pain control as needed.   - Bilirubin of 6.2 noted. This is in line with his baseline. Likely from ongoing sickle cell hemolysis.     Acute Kidney Injury  - In clinic baseline creatinine has been between 0.8 and 1.0.  - Creatinine at 1.36.  - Continue to monitor kidney functions with IVF.     Will continue to follow.  On discharge, patient to follow-up with Dr. Shirley Jordan.    Bala Pan MD  Hematology/Oncology  C: [phone]  O: [phone] TOLU VARGAS is a 42y Male who presents with a chief complaint of pain.    Sickle Cell Anemia  - Patient with baseline hemoglobin in the 5 to 7 range.  - Monitor CBC. Would hold off on transfusions unless clinically symptomatic.  - IVF and pain control as needed.   - Bilirubin of 6.2 noted. This is in line with his baseline. Likely from ongoing sickle cell hemolysis.     Acute Kidney Injury  - In clinic baseline creatinine has been between 0.8 and 1.0.  - Creatinine at 1.36.  - Continue to monitor kidney functions with IVF.     Will continue to follow.  On discharge, patient to follow-up with Dr. Shirley Jordan.    Bala Pan MD  Hematology/Oncology  O: [phone]

## 2022-05-31 NOTE — ED ADULT NURSE NOTE - ED STAT RN HANDOFF DETAILS 2
Patient admitted to medicine in no acute distress endorsed to hold RN. All labs drawn and sent Patient with right port patent and intact.

## 2022-05-31 NOTE — ED PROVIDER NOTE - CONSTITUTIONAL, MLM
normal... Mild distress. Awake, alert, oriented to person, place, time/situation and in no apparent distress.

## 2022-05-31 NOTE — H&P ADULT - PROBLEM SELECTOR PLAN 3
- Patient has hx of cirrhosis  - Pending outpatient w/u due to multiple hospital readmissions   - Avoid Tylenol for now

## 2022-05-31 NOTE — H&P ADULT - PROBLEM SELECTOR PLAN 1
- Patient with known SCD admitted for sickle cell crisis  - Hx of multiple admission for same complaint   - Started on pain regimen as per previous recommendations by pain management  - Will hold off on blood transfusions for now as recommended by heme onc Dr Jordan  - Consent for blood in the chart  - CT chest and abdomen pelvis noted, acute rib fractures   - Heme onc Dr Jordan on board, recommends to transfuse if Hg <5.3 - Patient with known SCD admitted for sickle cell crisis  - Hx of multiple admission for same complaint   - Started on pain regimen as per previous recommendations by pain management  - Consent for blood in the chart  - CT chest and abdomen pelvis noted, acute rib fractures   - Heme onc Dr Jordan on board, recommends to transfuse if Hg <5.3- will give one PRBC now - Patient with known SCD admitted for sickle cell crisis  - Hx of multiple admission for same complaint   - Started on pain regimen as per previous recommendations by pain management  - Consent for blood in the chart  - CT chest and abdomen pelvis noted, acute rib fractures   - F/u UA, Ucx, blood cx, hemolysis w/u  - Heme onc Dr Jordan on board, recommends to transfuse if Hg <5.3- will give one PRBC now - Patient with known SCD admitted for sickle cell crisis  - Hx of multiple admission for same complaint   - Started on pain regimen as per previous recommendations by pain management  - Consent for blood in the chart  - CT chest and abdomen pelvis noted, acute rib fractures   - F/u UA, Ucx, blood cx, hemolysis w/u  - Heme onc Dr Jordan on board, recommends to transfuse if Hg <5.3- will give one PRBC now  -Pain management consulted

## 2022-05-31 NOTE — H&P ADULT - PROBLEM SELECTOR PLAN 6
IMPROVE VTE Individual Risk Assessment  RISK                                                                Points  [  ] Previous VTE                                                  3  [  ] Thrombophilia                                               2  [  ] Lower limb paralysis                                      2        (unable to hold up >15 seconds)    [  ] Current Cancer                                              2         (within 6 months)  [  ] Immobilization > 24 hrs                                1  [  ] ICU/CCU stay > 24 hours                              1  [  ] Age > 60                                                      1  IMPROVE VTE Score _________    PPI for GI prophylaxis  SCD for DVT PPX

## 2022-05-31 NOTE — H&P ADULT - PROBLEM SELECTOR PLAN 2
- On admission, patient with Cr 1.36, baseline 0.7  - MATA most likely pre renal in the setting of acute cell crisis   - Monitor I/O's daily  - Avoid nephrotoxins, NSAIDS, ACEI and ARBS  - Started on IVF  - Monitor BMP daily
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
## 2022-05-31 NOTE — H&P ADULT - ASSESSMENT
40 y/o M with past medical hx of gout, cirrhosis, opioid dependence and SCD recently hospitalized 2/24-2/26, 2/28-3/7, 3/11-3/17 is admitted to medicine for sickle cell crisis

## 2022-05-31 NOTE — ED PROVIDER NOTE - MUSCULOSKELETAL, MLM
Tenderness to palpation over left anterior chest wall, no ecchymosis and no crepitance. No stepoff. On back exam, tenderness throughout the whole back midspinal and paraspinal. No cervical tenderness. Head atraumatic to palpitation. On right wrist, pain with palpation over the dorsal aspect with no swelling, no ecchymosis, 2+ radial pulses and normal radial, median and nerve function. On leg exam, tenderness to bilateral lower legs, no deformity, mild pitting edema and left leg has ecchymosis on the dorsal aspect. Moderate tenderness to palpation. 2+ DP pulses. Cap refill <2 seconds at distal toes.
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
## 2022-05-31 NOTE — H&P ADULT - PROBLEM SELECTOR PLAN 5
- Patient has hx of opioid dependence  - Pain meds as per pain management recommendation   - Caution upon discharge when medically stable

## 2022-05-31 NOTE — ED PROVIDER NOTE - CARE PLAN
1 Principal Discharge DX:	MATA (acute kidney injury)  Secondary Diagnosis:	Anemia  Secondary Diagnosis:	Fracture of ribs, two

## 2022-05-31 NOTE — ED ADULT NURSE NOTE - NSIMPLEMENTINTERV_GEN_ALL_ED
Implemented All Fall Risk Interventions:  [place] to call system. Call bell, personal items and telephone within reach. Instruct patient to call for assistance. Room bathroom lighting operational. Non-slip footwear when patient is off stretcher. Physically safe environment: no spills, clutter or unnecessary equipment. Stretcher in lowest position, wheels locked, appropriate side rails in place. Provide visual cue, wrist band, yellow gown, etc. Monitor gait and stability. Monitor for mental status changes and reorient to person, place, and time. Review medications for side effects contributing to fall risk. Reinforce activity limits and safety measures with patient and family.

## 2022-05-31 NOTE — ED PROVIDER NOTE - CLINICAL SUMMARY MEDICAL DECISION MAKING FREE TEXT BOX
43 y/o male with a history of sickle cell disease, followed by Dr. Jordan, comes in reporting that earlier in the day he was having some low back pain typical with sickle cell pain. Will obtain EKG, labs, CT chest, CT abdomen and pelvis, x-ray of the right wrist, bilateral lower legs and left foot, given morphine for pain and will reassess. 41 y/o male with a history of sickle cell disease, followed by Dr. Jordan, comes in reporting that earlier in the day he was having some low back pain typical with sickle cell pain. Will obtain EKG, labs, CT chest, CT abdomen and pelvis, x-ray of the right wrist, bilateral lower legs and left foot, given morphine for pain and will reassess.    labs show H/H 5.3/14.5, retic 20.6%, Cr 1.3 (previously 0.66)  CTchest/A/P shows Mild cortical irregularity to the left lateral eighth and ninth rib, which appear new (3:116) (3:126). This could represent acute nondisplaced fractures.  Discussed labs with Dr. Jordan who agrees with plan for IV hydration and further reeval of anemia. Dr. ellis accepts admission.
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
## 2022-06-01 ENCOUNTER — TRANSCRIPTION ENCOUNTER (OUTPATIENT)
Age: 42
End: 2022-06-01

## 2022-06-01 DIAGNOSIS — Z87.39 PERSONAL HISTORY OF OTHER DISEASES OF THE MUSCULOSKELETAL SYSTEM AND CONNECTIVE TISSUE: ICD-10-CM

## 2022-06-01 LAB
ALBUMIN SERPL ELPH-MCNC: 3.1 G/DL — LOW (ref 3.5–5)
ALP SERPL-CCNC: 125 U/L — HIGH (ref 40–120)
ALT FLD-CCNC: 59 U/L DA — SIGNIFICANT CHANGE UP (ref 10–60)
ANION GAP SERPL CALC-SCNC: 3 MMOL/L — LOW (ref 5–17)
ANION GAP SERPL CALC-SCNC: 4 MMOL/L — LOW (ref 5–17)
AST SERPL-CCNC: 150 U/L — HIGH (ref 10–40)
BASOPHILS # BLD AUTO: 0.12 K/UL — SIGNIFICANT CHANGE UP (ref 0–0.2)
BASOPHILS NFR BLD AUTO: 1.2 % — SIGNIFICANT CHANGE UP (ref 0–2)
BILIRUB SERPL-MCNC: 8.1 MG/DL — HIGH (ref 0.2–1.2)
BUN SERPL-MCNC: 21 MG/DL — HIGH (ref 7–18)
BUN SERPL-MCNC: 21 MG/DL — HIGH (ref 7–18)
CALCIUM SERPL-MCNC: 9 MG/DL — SIGNIFICANT CHANGE UP (ref 8.4–10.5)
CALCIUM SERPL-MCNC: 9.5 MG/DL — SIGNIFICANT CHANGE UP (ref 8.4–10.5)
CHLORIDE SERPL-SCNC: 111 MMOL/L — HIGH (ref 96–108)
CHLORIDE SERPL-SCNC: 111 MMOL/L — HIGH (ref 96–108)
CO2 SERPL-SCNC: 23 MMOL/L — SIGNIFICANT CHANGE UP (ref 22–31)
CO2 SERPL-SCNC: 25 MMOL/L — SIGNIFICANT CHANGE UP (ref 22–31)
CREAT SERPL-MCNC: 0.83 MG/DL — SIGNIFICANT CHANGE UP (ref 0.5–1.3)
CREAT SERPL-MCNC: 0.84 MG/DL — SIGNIFICANT CHANGE UP (ref 0.5–1.3)
EGFR: 111 ML/MIN/1.73M2 — SIGNIFICANT CHANGE UP
EGFR: 111 ML/MIN/1.73M2 — SIGNIFICANT CHANGE UP
EOSINOPHIL # BLD AUTO: 0.4 K/UL — SIGNIFICANT CHANGE UP (ref 0–0.5)
EOSINOPHIL NFR BLD AUTO: 4 % — SIGNIFICANT CHANGE UP (ref 0–6)
GLUCOSE SERPL-MCNC: 81 MG/DL — SIGNIFICANT CHANGE UP (ref 70–99)
GLUCOSE SERPL-MCNC: 93 MG/DL — SIGNIFICANT CHANGE UP (ref 70–99)
HCT VFR BLD CALC: 16.9 % — CRITICAL LOW (ref 39–50)
HGB BLD-MCNC: 6.2 G/DL — CRITICAL LOW (ref 13–17)
IMM GRANULOCYTES NFR BLD AUTO: 0.5 % — SIGNIFICANT CHANGE UP (ref 0–1.5)
LYMPHOCYTES # BLD AUTO: 3.01 K/UL — SIGNIFICANT CHANGE UP (ref 1–3.3)
LYMPHOCYTES # BLD AUTO: 30 % — SIGNIFICANT CHANGE UP (ref 13–44)
MAGNESIUM SERPL-MCNC: 2 MG/DL — SIGNIFICANT CHANGE UP (ref 1.6–2.6)
MCHC RBC-ENTMCNC: 33 PG — SIGNIFICANT CHANGE UP (ref 27–34)
MCHC RBC-ENTMCNC: 36.7 GM/DL — HIGH (ref 32–36)
MCV RBC AUTO: 89.9 FL — SIGNIFICANT CHANGE UP (ref 80–100)
MONOCYTES # BLD AUTO: 1.25 K/UL — HIGH (ref 0–0.9)
MONOCYTES NFR BLD AUTO: 12.5 % — SIGNIFICANT CHANGE UP (ref 2–14)
NEUTROPHILS # BLD AUTO: 5.2 K/UL — SIGNIFICANT CHANGE UP (ref 1.8–7.4)
NEUTROPHILS NFR BLD AUTO: 51.8 % — SIGNIFICANT CHANGE UP (ref 43–77)
NRBC # BLD: 0 /100 WBCS — SIGNIFICANT CHANGE UP (ref 0–0)
PHOSPHATE SERPL-MCNC: 3.7 MG/DL — SIGNIFICANT CHANGE UP (ref 2.5–4.5)
PLATELET # BLD AUTO: 269 K/UL — SIGNIFICANT CHANGE UP (ref 150–400)
POTASSIUM SERPL-MCNC: 4.7 MMOL/L — SIGNIFICANT CHANGE UP (ref 3.5–5.3)
POTASSIUM SERPL-MCNC: 5.6 MMOL/L — HIGH (ref 3.5–5.3)
POTASSIUM SERPL-SCNC: 4.7 MMOL/L — SIGNIFICANT CHANGE UP (ref 3.5–5.3)
POTASSIUM SERPL-SCNC: 5.6 MMOL/L — HIGH (ref 3.5–5.3)
PROT SERPL-MCNC: 7.4 G/DL — SIGNIFICANT CHANGE UP (ref 6–8.3)
RBC # BLD: 1.88 M/UL — LOW (ref 4.2–5.8)
RBC # FLD: 20.1 % — HIGH (ref 10.3–14.5)
SODIUM SERPL-SCNC: 138 MMOL/L — SIGNIFICANT CHANGE UP (ref 135–145)
SODIUM SERPL-SCNC: 139 MMOL/L — SIGNIFICANT CHANGE UP (ref 135–145)
WBC # BLD: 10.03 K/UL — SIGNIFICANT CHANGE UP (ref 3.8–10.5)
WBC # FLD AUTO: 10.03 K/UL — SIGNIFICANT CHANGE UP (ref 3.8–10.5)

## 2022-06-01 PROCEDURE — 99222 1ST HOSP IP/OBS MODERATE 55: CPT

## 2022-06-01 RX ORDER — SODIUM CHLORIDE 9 MG/ML
1000 INJECTION, SOLUTION INTRAVENOUS
Refills: 0 | Status: DISCONTINUED | OUTPATIENT
Start: 2022-06-01 | End: 2022-06-02

## 2022-06-01 RX ORDER — HYDROMORPHONE HYDROCHLORIDE 2 MG/ML
3 INJECTION INTRAMUSCULAR; INTRAVENOUS; SUBCUTANEOUS EVERY 4 HOURS
Refills: 0 | Status: DISCONTINUED | OUTPATIENT
Start: 2022-06-01 | End: 2022-06-02

## 2022-06-01 RX ORDER — LIDOCAINE 4 G/100G
1 CREAM TOPICAL DAILY
Refills: 0 | Status: DISCONTINUED | OUTPATIENT
Start: 2022-06-01 | End: 2022-06-02

## 2022-06-01 RX ADMIN — SODIUM CHLORIDE 70 MILLILITER(S): 9 INJECTION, SOLUTION INTRAVENOUS at 09:54

## 2022-06-01 RX ADMIN — HYDROMORPHONE HYDROCHLORIDE 2 MILLIGRAM(S): 2 INJECTION INTRAMUSCULAR; INTRAVENOUS; SUBCUTANEOUS at 09:07

## 2022-06-01 RX ADMIN — HYDROMORPHONE HYDROCHLORIDE 2 MILLIGRAM(S): 2 INJECTION INTRAMUSCULAR; INTRAVENOUS; SUBCUTANEOUS at 09:58

## 2022-06-01 RX ADMIN — HYDROMORPHONE HYDROCHLORIDE 3 MILLIGRAM(S): 2 INJECTION INTRAMUSCULAR; INTRAVENOUS; SUBCUTANEOUS at 23:26

## 2022-06-01 RX ADMIN — HYDROMORPHONE HYDROCHLORIDE 3 MILLIGRAM(S): 2 INJECTION INTRAMUSCULAR; INTRAVENOUS; SUBCUTANEOUS at 16:04

## 2022-06-01 RX ADMIN — HYDROMORPHONE HYDROCHLORIDE 2 MILLIGRAM(S): 2 INJECTION INTRAMUSCULAR; INTRAVENOUS; SUBCUTANEOUS at 13:00

## 2022-06-01 RX ADMIN — HYDROMORPHONE HYDROCHLORIDE 2 MILLIGRAM(S): 2 INJECTION INTRAMUSCULAR; INTRAVENOUS; SUBCUTANEOUS at 12:35

## 2022-06-01 RX ADMIN — HYDROMORPHONE HYDROCHLORIDE 2 MILLIGRAM(S): 2 INJECTION INTRAMUSCULAR; INTRAVENOUS; SUBCUTANEOUS at 05:53

## 2022-06-01 RX ADMIN — LIDOCAINE 1 PATCH: 4 CREAM TOPICAL at 17:06

## 2022-06-01 RX ADMIN — HYDROMORPHONE HYDROCHLORIDE 2 MILLIGRAM(S): 2 INJECTION INTRAMUSCULAR; INTRAVENOUS; SUBCUTANEOUS at 00:47

## 2022-06-01 RX ADMIN — HYDROMORPHONE HYDROCHLORIDE 3 MILLIGRAM(S): 2 INJECTION INTRAMUSCULAR; INTRAVENOUS; SUBCUTANEOUS at 19:21

## 2022-06-01 RX ADMIN — HYDROMORPHONE HYDROCHLORIDE 2 MILLIGRAM(S): 2 INJECTION INTRAMUSCULAR; INTRAVENOUS; SUBCUTANEOUS at 07:16

## 2022-06-01 RX ADMIN — Medication 1 MILLIGRAM(S): at 07:17

## 2022-06-01 RX ADMIN — LIDOCAINE 1 PATCH: 4 CREAM TOPICAL at 19:11

## 2022-06-01 RX ADMIN — Medication 1 MILLIGRAM(S): at 19:11

## 2022-06-01 RX ADMIN — HYDROMORPHONE HYDROCHLORIDE 3 MILLIGRAM(S): 2 INJECTION INTRAMUSCULAR; INTRAVENOUS; SUBCUTANEOUS at 16:30

## 2022-06-01 RX ADMIN — HYDROMORPHONE HYDROCHLORIDE 2 MILLIGRAM(S): 2 INJECTION INTRAMUSCULAR; INTRAVENOUS; SUBCUTANEOUS at 02:14

## 2022-06-01 RX ADMIN — HYDROMORPHONE HYDROCHLORIDE 3 MILLIGRAM(S): 2 INJECTION INTRAMUSCULAR; INTRAVENOUS; SUBCUTANEOUS at 20:00

## 2022-06-01 RX ADMIN — SENNA PLUS 2 TABLET(S): 8.6 TABLET ORAL at 21:15

## 2022-06-01 RX ADMIN — HYDROMORPHONE HYDROCHLORIDE 2 MILLIGRAM(S): 2 INJECTION INTRAMUSCULAR; INTRAVENOUS; SUBCUTANEOUS at 03:03

## 2022-06-01 NOTE — DISCHARGE NOTE PROVIDER - NSDCCPCAREPLAN_GEN_ALL_CORE_FT
PRINCIPAL DISCHARGE DIAGNOSIS  Diagnosis: MATA (acute kidney injury)  Assessment and Plan of Treatment: On admission with MATA. Cr 1.37, now resolved back to baseline Cr .83.      SECONDARY DISCHARGE DIAGNOSES  Diagnosis: Anemia  Assessment and Plan of Treatment: secondary to sicke cell disease. Patient now status post 1 unit PRBC with good effect. On admission HB 5.2 -- > 6.2 after 1 unit PRBC.    Diagnosis: Fracture of ribs, two  Assessment and Plan of Treatment: Found with acute fracutes on CT Scan:  Mild cortical irregularity to the left lateral eighth and ninth rib, which appear new (3:116) (3:126). This could represent acute nondisplaced fractures. Pain management was consulted.        PRINCIPAL DISCHARGE DIAGNOSIS  Diagnosis: MATA (acute kidney injury)  Assessment and Plan of Treatment: On admission with MATA. Cr 1.37, now resolved back to baseline Cr .88.      SECONDARY DISCHARGE DIAGNOSES  Diagnosis: Anemia, sickle cell with crisis  Assessment and Plan of Treatment: secondary to sicke cell disease. Patient now status post 1 unit PRBC with good effect. On admission HB 5.2 -- > 6.2 after 1 unit PRBC.  Hb 5.9 today; patient's baseline 5-7. As discussed with Dr. Perez, patient stable for discharge today with outpatient follow up next week at Dr Pan/Dr. Enciso's office.    Diagnosis: Fracture of ribs, two  Assessment and Plan of Treatment: Found with acute fracutes on CT Scan:  Mild cortical irregularity to the left lateral eighth and ninth rib, which appear new (3:116) (3:126). This could represent acute nondisplaced fractures. Pain management was consulted.  Continue current home medications, lidocaine patch sent to patient's pharmmacy.

## 2022-06-01 NOTE — DISCHARGE NOTE PROVIDER - NSDCFUADDINST_GEN_ALL_CORE_FT
Sickle cell anemia is when red blood cells are abnormal.  The RBCs get stuck together and are unable to bring enough oxygen to your organs  Call your doctor if any signs of infection, swelling, nausea or vomiting  Avoid dehydration by drinking adequate amount of fluids  Avoid stress  Avoid high altitude places and mountains where there is less oxygen  It is important to treat infetions right away even in the middle of the night - call your doctor with any temperature higher than 101.5  Keep your regular check up appointments with your doctor and follow his instructions carefully  Keep up with your vaccinations to prevent certain infections  Take medications as prescribed - you also will be on folic acid

## 2022-06-01 NOTE — DISCHARGE NOTE PROVIDER - NSDCMRMEDTOKEN_GEN_ALL_CORE_FT
allopurinol 100 mg oral tablet: orally once a day  colchicine 0.6 mg oral tablet: 1 tab(s) orally once a day  folic acid 1 mg oral tablet: 1 tab(s) orally 2 times a day  oxyCODONE 30 mg oral tablet: 1 tab(s) orally every 4 hours, As needed, Severe Pain (7 - 10)  senna oral tablet: 2 tab(s) orally once a day (at bedtime)   allopurinol 100 mg oral tablet: orally once a day  colchicine 0.6 mg oral tablet: 1 tab(s) orally once a day  folic acid 1 mg oral tablet: 1 tab(s) orally 2 times a day  lidocaine 4% topical film: Apply topically to affected area once a day  oxyCODONE 30 mg oral tablet: 1 tab(s) orally every 4 hours  senna oral tablet: 2 tab(s) orally once a day (at bedtime)
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
## 2022-06-01 NOTE — PROGRESS NOTE ADULT - SUBJECTIVE AND OBJECTIVE BOX
allopurinol 100 milliGRAM(s) Oral daily  colchicine 0.6 milliGRAM(s) Oral daily  folic acid 1 milliGRAM(s) Oral <User Schedule>  HYDROmorphone  Injectable 2 milliGRAM(s) IV Push every 3 hours PRN  lactated ringers. 1000 milliLiter(s) IV Continuous <Continuous>  melatonin 3 milliGRAM(s) Oral at bedtime PRN  ondansetron Injectable 4 milliGRAM(s) IV Push every 8 hours PRN  polyethylene glycol 3350 17 Gram(s) Oral daily  senna 2 Tablet(s) Oral at bedtime                            6.1    8.75  )-----------( 259      ( 31 May 2022 21:14 )             17.1       Hemoglobin: 6.1 g/dL (05-31 @ 21:14)  Hemoglobin: 5.3 g/dL (05-31 @ 10:55)  Hemoglobin: 5.3 g/dL (05-31 @ 02:48)      05-31    138  |  111<H>  |  27<H>  ----------------------------<  91  4.3   |  22  |  1.04    Ca    8.8      31 May 2022 10:55  Phos  3.3     05-31  Mg     2.1     05-31    TPro  7.6  /  Alb  3.4<L>  /  TBili  6.1<H>  /  DBili  2.4<H>  /  AST  111<H>  /  ALT  56  /  AlkPhos  133<H>  05-31    Creatinine Trend: 1.04<--, 1.36<--    COAGS:         PHYSICAL EXAM:  Vital Signs last 24 Hours   T(C): 36.9 (06-01-22 @ 05:10), Max: 36.9 (05-31-22 @ 18:07)  HR: 67 (06-01-22 @ 05:10) (67 - 79)  BP: 135/71 (06-01-22 @ 05:10) (106/66 - 139/81)  RR: 17 (06-01-22 @ 05:10) (16 - 19)  SpO2: 94% (06-01-22 @ 05:10) (91% - 99%)  Wt(kg): --    I&O's Summary   Patient seen and examined, in NAD, hemodynamically stable. Endorses generalized pain. Was admitted s/p fall with rib fx on CT scan.       allopurinol 100 milliGRAM(s) Oral daily  colchicine 0.6 milliGRAM(s) Oral daily  folic acid 1 milliGRAM(s) Oral <User Schedule>  HYDROmorphone  Injectable 2 milliGRAM(s) IV Push every 3 hours PRN  lactated ringers. 1000 milliLiter(s) IV Continuous <Continuous>  melatonin 3 milliGRAM(s) Oral at bedtime PRN  ondansetron Injectable 4 milliGRAM(s) IV Push every 8 hours PRN  polyethylene glycol 3350 17 Gram(s) Oral daily  senna 2 Tablet(s) Oral at bedtime                            6.1    8.75  )-----------( 259      ( 31 May 2022 21:14 )             17.1       Hemoglobin: 6.1 g/dL (05-31 @ 21:14)  Hemoglobin: 5.3 g/dL (05-31 @ 10:55)  Hemoglobin: 5.3 g/dL (05-31 @ 02:48)      05-31    138  |  111<H>  |  27<H>  ----------------------------<  91  4.3   |  22  |  1.04    Ca    8.8      31 May 2022 10:55  Phos  3.3     05-31  Mg     2.1     05-31    TPro  7.6  /  Alb  3.4<L>  /  TBili  6.1<H>  /  DBili  2.4<H>  /  AST  111<H>  /  ALT  56  /  AlkPhos  133<H>  05-31    Creatinine Trend: 1.04<--, 1.36<--    COAGS:         PHYSICAL EXAM:  Vital Signs last 24 Hours   T(C): 36.9 (06-01-22 @ 05:10), Max: 36.9 (05-31-22 @ 18:07)  HR: 67 (06-01-22 @ 05:10) (67 - 79)  BP: 135/71 (06-01-22 @ 05:10) (106/66 - 139/81)  RR: 17 (06-01-22 @ 05:10) (16 - 19)  SpO2: 94% (06-01-22 @ 05:10) (91% - 99%)  Wt(kg): --    I&O's Summary

## 2022-06-01 NOTE — PROGRESS NOTE ADULT - PROBLEM SELECTOR PLAN 1
- Patient with known SCD admitted for sickle cell crisis  - Hx of multiple admission for same complaint   - Started on pain regimen as per previous recommendations by pain management  - Consent for blood in the chart  - CT chest and abdomen pelvis noted, acute rib fractures   - F/u UA, Ucx, blood cx, hemolysis w/u  - Heme onc Dr Jordan on board, recommends to transfuse if Hg <5.3- will give one PRBC now  - Pain management consulted - Patient with known SCD admitted for sickle cell crisis  - Hx of multiple admission for same complaint   - Started on pain regimen as per previous recommendations by pain management  - Consent for blood in the chart  - CT chest and abdomen pelvis noted, acute rib fractures   - UA, Ucx, blood cx, hemolysis w/u pending  - CBC on admission 5.3 s/p 1 unit PRBC, this am Hb 6.2/16.9  - Heme onc Dr Jordan on board, recommends to transfuse if Hg <5.3  - Pain management consulted

## 2022-06-01 NOTE — CONSULT NOTE ADULT - ASSESSMENT
[facility] MME Calculator is now available by clicking the MME Calculator tab on the purple task bar. An informational sheet can be found by clicking ‘MME Calculator Help’ on the MME Calculator page. This informational tool is a resource only and is not meant for direct clinical application.   Patient Search   Multi-Patient Search   MME Calculator   Reports   Drug List   Designation   My RAFAELA #  Data Detail Level: Printer-Friendly View Extended View  Confidential Drug Utilization Report  Search Terms: macey gonzales, 1980Search Date: 06/01/2022 15:03:31 PM  The Drug Utilization Report below displays all of the controlled substance prescriptions, if any, that your patient has filled in the last twelve months. The information displayed on this report is compiled from pharmacy submissions to the Department, and accurately reflects the information as submitted by the pharmacies.    This report was requested by: Lora Rod | Reference #: [id number]    Others' Prescriptions  Patient Name: Macey GonzalesBirth Date: 1980  Address: [address] [place] [id number]: Male  Rx Written	Rx Dispensed	Drug	Quantity	Days Supply	Prescriber Name  05/19/2022	05/20/2022	oxycodone hcl (ir) 30 mg tab	180	30	JordanShirley MD  Prescriber Rafaela # [id number]  Payment Method Insurance  Dispens[facility]   04/21/2022	04/22/2022	oxycodone hcl (ir) 30 mg tab	180	30	Jordan, Shirley [employer]  Prescriber Rafaela # [id number]  Payment Method Insurance  Dispens[facility]   03/24/2022	03/25/2022	oxycodone hcl (ir) 30 mg tab	180	30	JordanShirley MD  Prescriber Rafaela # [id number]  Payment Method Insurance  Dispens[facility]   02/24/2022	02/24/2022	oxycodone hcl (ir) 30 mg tab	180	30	JordanShirley MD  Prescriber Rafaela # [id number]  Payment Method Insurance  Dispens[facility]   12/30/2021	12/30/2021	oxycodone hcl (ir) 30 mg tab	180	30	Darrion Barron  Prescriber Rafaela # [id number]  Payment Method Insurance  Dispens[facility]   12/02/2021	12/02/2021	oxycodone hcl (ir) 30 mg tab	180	30	Jordan, Shirley [employer]  Prescriber Rafaela # [id number]  Payment Method Insurance  Dispens[facility]   11/04/2021	11/05/2021	oxycodone hcl 30 mg tablet	180	30	Jordan, Shirley [employer]  Prescriber Rafaela # [id number]  Payment Method Insurance  Dispens[facility]   10/07/2021	10/07/2021	oxycodone hcl 30 mg tablet	180	30	Jordan, Shirley [employer]IO  Prescriber Rafaela # [id number]  Payment Method Insurance  Dispens[facility]   08/12/2021	08/12/2021	oxycodone hcl 30 mg tablet	180	30	Jordan, Shirley [employer]IO  Prescriber Rafaela # [id number]  Payment Method Insurance  Dispens[facility]   07/15/2021	07/15/2021	oxycodone hcl (ir) 30 mg tab	180	30	Jordan, Shirley [employer]IO  Prescriber Rafaela # [id number]  Payment Method Insurance  Dispens[facility]   * - Drugs marked with an asterisk are compound drugs. If the compound drug is made up of more than one controlled

## 2022-06-01 NOTE — PROGRESS NOTE ADULT - ASSESSMENT
40 y/o M with past medical hx of gout, cirrhosis, opioid dependence and SCD recently hospitalized 2/24-2/26, 2/28-3/7, 3/11-3/17 is admitted to medicine for sickle cell crisis. Had a recent fall, CT Chest shows mild cortical irregularity of left lateral eight and ninth rib - new non displaced. Pain management following.

## 2022-06-01 NOTE — DISCHARGE NOTE PROVIDER - CARE PROVIDER_API CALL
Chuck Jordan  INTERNAL MEDICINE  [address] [place] [address]  Phone: [phone]  Fax: [phone]  Follow Up Time:

## 2022-06-01 NOTE — CONSULT NOTE ADULT - PROBLEM SELECTOR RECOMMENDATION 9
Pt with history of joint pain.   sickle cell crisis.  - acute. s/p fall.  +rib fractures.    Mild pain - pain level 1-3  nonpharmacological measures  ice packs, heat packs, PT/OOB, guided imagery, distraction   Opioid Recc  - pt takes oxycodone 30mg po q 8 hours prn at home  - iv hydromorphone 3mg ivp q 4 hours prn severe pain   Bowel Regimen  - senna  - miralax  OOB

## 2022-06-01 NOTE — PROGRESS NOTE ADULT - SUBJECTIVE AND OBJECTIVE BOX
Patient is a 42y old  Male who presents with a chief complaint of SICKLE CELL CRISIS (01 Jun 2022 15:01)    pt seen in icu [  ], reg med floor [ x  ], bed [ x ], chair at bedside [   ], a+o x3 [ x ], lethargic [  ],  nad [x  ]      Allergies    ceftriaxone (Anaphylaxis)  hydroxyurea (Other)  penicillin (Pruritus)  piperacillin-tazobactam (Urticaria)        Vitals    T(F): 98 (06-01-22 @ 13:14), Max: 98.4 (05-31-22 @ 18:07)  HR: 82 (06-01-22 @ 13:14) (67 - 82)  BP: 115/55 (06-01-22 @ 13:14) (112/70 - 139/81)  RR: 18 (06-01-22 @ 13:14) (16 - 19)  SpO2: 92% (06-01-22 @ 13:14) (91% - 99%)  Wt(kg): --  CAPILLARY BLOOD GLUCOSE          Labs                          6.2    10.03 )-----------( 269      ( 01 Jun 2022 06:52 )             16.9       06-01    139  |  111<H>  |  21<H>  ----------------------------<  93  4.7   |  25  |  0.83    Ca    9.5      01 Jun 2022 11:00  Phos  3.7     06-01  Mg     2.0     06-01    TPro  7.4  /  Alb  3.1<L>  /  TBili  8.1<H>  /  DBili  x   /  AST  150<H>  /  ALT  59  /  AlkPhos  125<H>  06-01          Troponin I, High Sensitivity Result: 10.2 ng/L (05-31-22 @ 10:55)  Troponin I, High Sensitivity Result: 8.9 ng/L (05-31-22 @ 02:47)        Radiology Results      Meds    MEDICATIONS  (STANDING):  allopurinol 100 milliGRAM(s) Oral daily  colchicine 0.6 milliGRAM(s) Oral daily  folic acid 1 milliGRAM(s) Oral <User Schedule>  lactated ringers. 1000 milliLiter(s) (70 mL/Hr) IV Continuous <Continuous>  lidocaine   4% Patch 1 Patch Transdermal daily  polyethylene glycol 3350 17 Gram(s) Oral daily  senna 2 Tablet(s) Oral at bedtime      MEDICATIONS  (PRN):  HYDROmorphone  Injectable 3 milliGRAM(s) IV Push every 4 hours PRN Severe Pain (7 - 10)  melatonin 3 milliGRAM(s) Oral at bedtime PRN Insomnia  ondansetron Injectable 4 milliGRAM(s) IV Push every 8 hours PRN Nausea and/or Vomiting      Physical Exam    Neuro :  no focal deficits  Respiratory: CTA B/L  CV: RRR, S1S2, no murmurs,   Abdominal: Soft, NT, ND +BS,  Extremities: No edema, + peripheral pulses      ASSESSMENT    Acute renal failure  left rib fx,   low back pain poss 2nd to sickle cell pain crisis,   anemia,   jaundice 2nd to hemolysis 2nd to sickle cell disease,   h/o gout,   cirrhosis,   opioid dependence   SCD  Sickle cell anemia  Marijuana abuse  cholecystectomy        PLAN    d/c ivf,   serum creat wnl   pain mgmt cons noted   nonpharmacological measures  ice packs, heat packs, PT/OOB, guided imagery, distraction   Opioid Recc  - pt takes oxycodone 30mg po q 8 hours prn at home  - iv hydromorphone 3mg ivp q 4 hours prn severe pain   Bowel Regimen  - senna  - miralax  OOB.  heme onc f/u   baseline hemoglobin in the 5 to 7 range.  s/p transfusion 1unit prbc  h/h stable   transfuse if Hg <5.3,  Bilirubin of 8.1 noted Likely from ongoing sickle cell hemolysis.   cont current meds  poss d/c in am if stable
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
## 2022-06-02 ENCOUNTER — TRANSCRIPTION ENCOUNTER (OUTPATIENT)
Age: 42
End: 2022-06-02

## 2022-06-02 VITALS
HEART RATE: 88 BPM | OXYGEN SATURATION: 96 % | SYSTOLIC BLOOD PRESSURE: 124 MMHG | TEMPERATURE: 98 F | RESPIRATION RATE: 17 BRPM | DIASTOLIC BLOOD PRESSURE: 68 MMHG

## 2022-06-02 LAB
ANION GAP SERPL CALC-SCNC: 5 MMOL/L — SIGNIFICANT CHANGE UP (ref 5–17)
BILIRUB SERPL-MCNC: 8.4 MG/DL — HIGH (ref 0.2–1.2)
BUN SERPL-MCNC: 20 MG/DL — HIGH (ref 7–18)
CALCIUM SERPL-MCNC: 9 MG/DL — SIGNIFICANT CHANGE UP (ref 8.4–10.5)
CHLORIDE SERPL-SCNC: 110 MMOL/L — HIGH (ref 96–108)
CO2 SERPL-SCNC: 25 MMOL/L — SIGNIFICANT CHANGE UP (ref 22–31)
CREAT SERPL-MCNC: 0.7 MG/DL — SIGNIFICANT CHANGE UP (ref 0.5–1.3)
EGFR: 117 ML/MIN/1.73M2 — SIGNIFICANT CHANGE UP
GLUCOSE SERPL-MCNC: 93 MG/DL — SIGNIFICANT CHANGE UP (ref 70–99)
HCT VFR BLD CALC: SIGNIFICANT CHANGE UP % (ref 39–50)
HGB BLD-MCNC: 5.9 G/DL — CRITICAL LOW (ref 13–17)
INR BLD: 1.14 RATIO — SIGNIFICANT CHANGE UP (ref 0.88–1.16)
MCHC RBC-ENTMCNC: SIGNIFICANT CHANGE UP GM/DL (ref 32–36)
MCHC RBC-ENTMCNC: SIGNIFICANT CHANGE UP PG (ref 27–34)
MCV RBC AUTO: 86.5 FL — SIGNIFICANT CHANGE UP (ref 80–100)
MELD SCORE WITH DIALYSIS: 29 POINTS — SIGNIFICANT CHANGE UP
MELD SCORE WITHOUT DIALYSIS: 16 POINTS — SIGNIFICANT CHANGE UP
NRBC # BLD: SIGNIFICANT CHANGE UP /100 WBCS (ref 0–0)
PLATELET # BLD AUTO: 251 K/UL — SIGNIFICANT CHANGE UP (ref 150–400)
POTASSIUM SERPL-MCNC: 4.5 MMOL/L — SIGNIFICANT CHANGE UP (ref 3.5–5.3)
POTASSIUM SERPL-SCNC: 4.5 MMOL/L — SIGNIFICANT CHANGE UP (ref 3.5–5.3)
PROTHROM AB SERPL-ACNC: 13.6 SEC — HIGH (ref 10.5–13.4)
RBC # BLD: SIGNIFICANT CHANGE UP M/UL (ref 4.2–5.8)
RBC # FLD: SIGNIFICANT CHANGE UP % (ref 10.3–14.5)
SODIUM SERPL-SCNC: 140 MMOL/L — SIGNIFICANT CHANGE UP (ref 135–145)
WBC # BLD: 8.64 K/UL — SIGNIFICANT CHANGE UP (ref 3.8–10.5)
WBC # FLD AUTO: 8.64 K/UL — SIGNIFICANT CHANGE UP (ref 3.8–10.5)

## 2022-06-02 PROCEDURE — 80053 COMPREHEN METABOLIC PANEL: CPT

## 2022-06-02 PROCEDURE — 85025 COMPLETE CBC W/AUTO DIFF WBC: CPT

## 2022-06-02 PROCEDURE — 83010 ASSAY OF HAPTOGLOBIN QUANT: CPT

## 2022-06-02 PROCEDURE — 84484 ASSAY OF TROPONIN QUANT: CPT

## 2022-06-02 PROCEDURE — 96374 THER/PROPH/DIAG INJ IV PUSH: CPT

## 2022-06-02 PROCEDURE — 71250 CT THORAX DX C-: CPT | Mod: MA

## 2022-06-02 PROCEDURE — 80048 BASIC METABOLIC PNL TOTAL CA: CPT

## 2022-06-02 PROCEDURE — 36430 TRANSFUSION BLD/BLD COMPNT: CPT

## 2022-06-02 PROCEDURE — 73620 X-RAY EXAM OF FOOT: CPT

## 2022-06-02 PROCEDURE — 83540 ASSAY OF IRON: CPT

## 2022-06-02 PROCEDURE — 86922 COMPATIBILITY TEST ANTIGLOB: CPT

## 2022-06-02 PROCEDURE — 74176 CT ABD & PELVIS W/O CONTRAST: CPT | Mod: MA

## 2022-06-02 PROCEDURE — 99285 EMERGENCY DEPT VISIT HI MDM: CPT

## 2022-06-02 PROCEDURE — 86900 BLOOD TYPING SEROLOGIC ABO: CPT

## 2022-06-02 PROCEDURE — 85730 THROMBOPLASTIN TIME PARTIAL: CPT

## 2022-06-02 PROCEDURE — 87635 SARS-COV-2 COVID-19 AMP PRB: CPT

## 2022-06-02 PROCEDURE — 86901 BLOOD TYPING SEROLOGIC RH(D): CPT

## 2022-06-02 PROCEDURE — 85610 PROTHROMBIN TIME: CPT

## 2022-06-02 PROCEDURE — 84100 ASSAY OF PHOSPHORUS: CPT

## 2022-06-02 PROCEDURE — 82247 BILIRUBIN TOTAL: CPT

## 2022-06-02 PROCEDURE — 93005 ELECTROCARDIOGRAM TRACING: CPT

## 2022-06-02 PROCEDURE — 73590 X-RAY EXAM OF LOWER LEG: CPT

## 2022-06-02 PROCEDURE — 83735 ASSAY OF MAGNESIUM: CPT

## 2022-06-02 PROCEDURE — 85045 AUTOMATED RETICULOCYTE COUNT: CPT

## 2022-06-02 PROCEDURE — 73110 X-RAY EXAM OF WRIST: CPT

## 2022-06-02 PROCEDURE — 83550 IRON BINDING TEST: CPT

## 2022-06-02 PROCEDURE — 86850 RBC ANTIBODY SCREEN: CPT

## 2022-06-02 PROCEDURE — 36415 COLL VENOUS BLD VENIPUNCTURE: CPT

## 2022-06-02 PROCEDURE — 87040 BLOOD CULTURE FOR BACTERIA: CPT

## 2022-06-02 PROCEDURE — 82728 ASSAY OF FERRITIN: CPT

## 2022-06-02 PROCEDURE — 83615 LACTATE (LD) (LDH) ENZYME: CPT

## 2022-06-02 PROCEDURE — 85027 COMPLETE CBC AUTOMATED: CPT

## 2022-06-02 PROCEDURE — 82248 BILIRUBIN DIRECT: CPT

## 2022-06-02 PROCEDURE — P9040: CPT

## 2022-06-02 RX ORDER — OXYCODONE HYDROCHLORIDE 5 MG/1
1 TABLET ORAL
Qty: 42 | Refills: 0
Start: 2022-06-02 | End: 2022-06-08

## 2022-06-02 RX ORDER — OXYCODONE HYDROCHLORIDE 5 MG/1
1 TABLET ORAL
Qty: 0 | Refills: 0 | DISCHARGE
Start: 2022-06-02

## 2022-06-02 RX ORDER — LIDOCAINE 4 G/100G
1 CREAM TOPICAL
Qty: 0 | Refills: 0 | DISCHARGE
Start: 2022-06-02

## 2022-06-02 RX ADMIN — POLYETHYLENE GLYCOL 3350 17 GRAM(S): 17 POWDER, FOR SOLUTION ORAL at 11:31

## 2022-06-02 RX ADMIN — Medication 0.6 MILLIGRAM(S): at 11:31

## 2022-06-02 RX ADMIN — LIDOCAINE 1 PATCH: 4 CREAM TOPICAL at 04:46

## 2022-06-02 RX ADMIN — LIDOCAINE 1 PATCH: 4 CREAM TOPICAL at 11:31

## 2022-06-02 RX ADMIN — HYDROMORPHONE HYDROCHLORIDE 3 MILLIGRAM(S): 2 INJECTION INTRAMUSCULAR; INTRAVENOUS; SUBCUTANEOUS at 00:02

## 2022-06-02 RX ADMIN — HYDROMORPHONE HYDROCHLORIDE 3 MILLIGRAM(S): 2 INJECTION INTRAMUSCULAR; INTRAVENOUS; SUBCUTANEOUS at 03:40

## 2022-06-02 RX ADMIN — HYDROMORPHONE HYDROCHLORIDE 3 MILLIGRAM(S): 2 INJECTION INTRAMUSCULAR; INTRAVENOUS; SUBCUTANEOUS at 08:05

## 2022-06-02 RX ADMIN — HYDROMORPHONE HYDROCHLORIDE 3 MILLIGRAM(S): 2 INJECTION INTRAMUSCULAR; INTRAVENOUS; SUBCUTANEOUS at 07:47

## 2022-06-02 RX ADMIN — Medication 1 MILLIGRAM(S): at 06:57

## 2022-06-02 RX ADMIN — HYDROMORPHONE HYDROCHLORIDE 3 MILLIGRAM(S): 2 INJECTION INTRAMUSCULAR; INTRAVENOUS; SUBCUTANEOUS at 12:16

## 2022-06-02 RX ADMIN — Medication 100 MILLIGRAM(S): at 11:31

## 2022-06-02 RX ADMIN — HYDROMORPHONE HYDROCHLORIDE 3 MILLIGRAM(S): 2 INJECTION INTRAMUSCULAR; INTRAVENOUS; SUBCUTANEOUS at 12:50

## 2022-06-02 RX ADMIN — HYDROMORPHONE HYDROCHLORIDE 3 MILLIGRAM(S): 2 INJECTION INTRAMUSCULAR; INTRAVENOUS; SUBCUTANEOUS at 04:41

## 2022-06-02 NOTE — PROGRESS NOTE ADULT - SUBJECTIVE AND OBJECTIVE BOX
Patient is a 42y old  Male who presents with a chief complaint of SICKLE CELL CRISIS (01 Jun 2022 16:29)    pt seen in icu [  ], reg med floor [ x  ], bed [ x ], chair at bedside [   ], a+o x3 [ x ], lethargic [  ],    nad [x  ]      Allergies    ceftriaxone (Anaphylaxis)  hydroxyurea (Other)  penicillin (Pruritus)  piperacillin-tazobactam (Urticaria)        Vitals    T(F): 98.5 (06-02-22 @ 04:50), Max: 98.5 (06-02-22 @ 04:50)  HR: 83 (06-02-22 @ 04:50) (82 - 88)  BP: 132/67 (06-02-22 @ 04:50) (115/55 - 132/67)  RR: 18 (06-02-22 @ 04:50) (18 - 18)  SpO2: 93% (06-02-22 @ 04:50) (92% - 94%)  Wt(kg): --  CAPILLARY BLOOD GLUCOSE          Labs                          6.2    10.03 )-----------( 269      ( 01 Jun 2022 06:52 )             16.9       06-01    139  |  111<H>  |  21<H>  ----------------------------<  93  4.7   |  25  |  0.83    Ca    9.5      01 Jun 2022 11:00  Phos  3.7     06-01  Mg     2.0     06-01    TPro  7.4  /  Alb  3.1<L>  /  TBili  8.1<H>  /  DBili  x   /  AST  150<H>  /  ALT  59  /  AlkPhos  125<H>  06-01          Troponin I, High Sensitivity Result: 10.2 ng/L (05-31-22 @ 10:55)  Troponin I, High Sensitivity Result: 8.9 ng/L (05-31-22 @ 02:47)    .Stool Feces  03-11 @ 21:09   No enteric pathogens isolated.  (Stool culture examined for Salmonella,  Shigella, Campylobacter, Aeromonas, Plesiomonas,  Vibrio, E.coli O157 and Yersinia)  --  --      .Blood Blood-Peripheral  03-11 @ 03:10   No Growth Final  --  --      .Blood Blood-Peripheral  03-11 @ 03:09   No Growth Final  --  --          Radiology Results      Meds    MEDICATIONS  (STANDING):  allopurinol 100 milliGRAM(s) Oral daily  colchicine 0.6 milliGRAM(s) Oral daily  folic acid 1 milliGRAM(s) Oral <User Schedule>  lactated ringers. 1000 milliLiter(s) (70 mL/Hr) IV Continuous <Continuous>  lidocaine   4% Patch 1 Patch Transdermal daily  polyethylene glycol 3350 17 Gram(s) Oral daily  senna 2 Tablet(s) Oral at bedtime      MEDICATIONS  (PRN):  HYDROmorphone  Injectable 3 milliGRAM(s) IV Push every 4 hours PRN Severe Pain (7 - 10)  melatonin 3 milliGRAM(s) Oral at bedtime PRN Insomnia  ondansetron Injectable 4 milliGRAM(s) IV Push every 8 hours PRN Nausea and/or Vomiting      Physical Exam    Neuro :  no focal deficits  Respiratory: CTA B/L  CV: RRR, S1S2, no murmurs,   Abdominal: Soft, NT, ND +BS,  Extremities: No edema, + peripheral pulses      ASSESSMENT    Acute renal failure  left rib fx,   low back pain poss 2nd to sickle cell pain crisis,   anemia,   jaundice 2nd to hemolysis 2nd to sickle cell disease,   h/o gout,   cirrhosis,   opioid dependence   SCD  Sickle cell anemia  Marijuana abuse  cholecystectomy        PLAN    d/c ivf,   serum creat wnl   pain mgmt cons noted   nonpharmacological measures  ice packs, heat packs, PT/OOB, guided imagery, distraction   Opioid Recc  - pt takes oxycodone 30mg po q 8 hours prn at home  - iv hydromorphone 3mg ivp q 4 hours prn severe pain   Bowel Regimen  - senna  - miralax  OOB.  heme onc f/u   baseline hemoglobin in the 5 to 7 range.  s/p transfusion 1unit prbc  h/h stable   transfuse if Hg <5.3,  Bilirubin of 8.1 noted Likely from ongoing sickle cell hemolysis.   cont current meds  poss d/c in am if stable         Patient is a 42y old  Male who presents with a chief complaint of SICKLE CELL CRISIS (01 Jun 2022 16:29)    pt seen in icu [  ], reg med floor [ x  ], bed [ x ], chair at bedside [   ], a+o x3 [ x ], lethargic [  ],    nad [x  ]      Allergies    ceftriaxone (Anaphylaxis)  hydroxyurea (Other)  penicillin (Pruritus)  piperacillin-tazobactam (Urticaria)        Vitals    T(F): 98.5 (06-02-22 @ 04:50), Max: 98.5 (06-02-22 @ 04:50)  HR: 83 (06-02-22 @ 04:50) (82 - 88)  BP: 132/67 (06-02-22 @ 04:50) (115/55 - 132/67)  RR: 18 (06-02-22 @ 04:50) (18 - 18)  SpO2: 93% (06-02-22 @ 04:50) (92% - 94%)  Wt(kg): --  CAPILLARY BLOOD GLUCOSE          Labs                          6.2    10.03 )-----------( 269      ( 01 Jun 2022 06:52 )             16.9       06-01    139  |  111<H>  |  21<H>  ----------------------------<  93  4.7   |  25  |  0.83    Ca    9.5      01 Jun 2022 11:00  Phos  3.7     06-01  Mg     2.0     06-01    TPro  7.4  /  Alb  3.1<L>  /  TBili  8.1<H>  /  DBili  x   /  AST  150<H>  /  ALT  59  /  AlkPhos  125<H>  06-01          Troponin I, High Sensitivity Result: 10.2 ng/L (05-31-22 @ 10:55)  Troponin I, High Sensitivity Result: 8.9 ng/L (05-31-22 @ 02:47)    --          Radiology Results      Meds    MEDICATIONS  (STANDING):  allopurinol 100 milliGRAM(s) Oral daily  colchicine 0.6 milliGRAM(s) Oral daily  folic acid 1 milliGRAM(s) Oral <User Schedule>  lactated ringers. 1000 milliLiter(s) (70 mL/Hr) IV Continuous <Continuous>  lidocaine   4% Patch 1 Patch Transdermal daily  polyethylene glycol 3350 17 Gram(s) Oral daily  senna 2 Tablet(s) Oral at bedtime      MEDICATIONS  (PRN):  HYDROmorphone  Injectable 3 milliGRAM(s) IV Push every 4 hours PRN Severe Pain (7 - 10)  melatonin 3 milliGRAM(s) Oral at bedtime PRN Insomnia  ondansetron Injectable 4 milliGRAM(s) IV Push every 8 hours PRN Nausea and/or Vomiting      Physical Exam    Neuro :  no focal deficits  Respiratory: CTA B/L  CV: RRR, S1S2, no murmurs,   Abdominal: Soft, NT, ND +BS,  Extremities: No edema, + peripheral pulses      ASSESSMENT    Acute renal failure  left rib fx,   low back pain poss 2nd to sickle cell pain crisis,   anemia,   jaundice 2nd to hemolysis 2nd to sickle cell disease,   h/o gout,   cirrhosis,   opioid dependence   SCD  Sickle cell anemia  Marijuana abuse  cholecystectomy        PLAN    d/c ivf,   serum creat wnl   pain mgmt cons noted   nonpharmacological measures  ice packs, heat packs, PT/OOB, guided imagery, distraction   Opioid Recc  - pt takes oxycodone 30mg po q 8 hours prn at home  - iv hydromorphone 3mg ivp q 4 hours prn severe pain   Bowel Regimen  - senna  - miralax  OOB.  heme onc f/u   baseline hemoglobin in the 5 to 7 range.  s/p transfusion 1unit prbc  f/u am labs  transfuse if Hg <5.3,  Bilirubin of 8.1 noted Likely from ongoing sickle cell hemolysis.   cont current meds  d/c plan if cleared by heme onc

## 2022-06-02 NOTE — PROGRESS NOTE ADULT - PROBLEM SELECTOR PLAN 1
- Patient with known SCD admitted for sickle cell crisis  - Hx of multiple admission for same complaint   - Started on pain regimen as per previous recommendations by pain management  - Consent for blood in the chart  - CT chest and abdomen pelvis noted, acute rib fractures   - UA, Ucx, blood cx, hemolysis w/u pending  - CBC on admission 5.3 s/p 1 unit PRBC, this am Hb 6.2/16.9  - Heme onc Dr Jordan on board, recommends to transfuse if Hg <5.3  - Pain management consulted - Patient with known SCD admitted for sickle cell crisis  - Hx of multiple admission for same complaint   - Started on pain regimen as per previous recommendations by pain management  - Consent for blood in the chart  - CT chest and abdomen pelvis noted, acute rib fractures   - CBC on admission 5.3 s/p 1 unit PRBC, this am Hb 6.2/16.9  - hemoglobin stable at patient's baseline 5-7  - bilirubin 8.1 today likely from ongoing sickle cell hemolysis  - Heme onc Dr Jordan on board, recommends to transfuse if Hg <5.3  - Pain management following, reccs noted

## 2022-06-02 NOTE — PROGRESS NOTE ADULT - SUBJECTIVE AND OBJECTIVE BOX
allopurinol 100 milliGRAM(s) Oral daily  colchicine 0.6 milliGRAM(s) Oral daily  folic acid 1 milliGRAM(s) Oral <User Schedule>  HYDROmorphone  Injectable 3 milliGRAM(s) IV Push every 4 hours PRN  lactated ringers. 1000 milliLiter(s) IV Continuous <Continuous>  lidocaine   4% Patch 1 Patch Transdermal daily  melatonin 3 milliGRAM(s) Oral at bedtime PRN  ondansetron Injectable 4 milliGRAM(s) IV Push every 8 hours PRN  polyethylene glycol 3350 17 Gram(s) Oral daily  senna 2 Tablet(s) Oral at bedtime                            6.2    10.03 )-----------( 269      ( 01 Jun 2022 06:52 )             16.9       Hemoglobin: 6.2 g/dL (06-01 @ 06:52)  Hemoglobin: 6.1 g/dL (05-31 @ 21:14)  Hemoglobin: 5.3 g/dL (05-31 @ 10:55)  Hemoglobin: 5.3 g/dL (05-31 @ 02:48)      06-01    139  |  111<H>  |  21<H>  ----------------------------<  93  4.7   |  25  |  0.83    Ca    9.5      01 Jun 2022 11:00  Phos  3.7     06-01  Mg     2.0     06-01    TPro  7.4  /  Alb  3.1<L>  /  TBili  8.1<H>  /  DBili  x   /  AST  150<H>  /  ALT  59  /  AlkPhos  125<H>  06-01    Creatinine Trend: 0.83<--, 0.84<--, 1.04<--, 1.36<--    COAGS: PT/INR - ( 02 Jun 2022 06:18 )   PT: 13.6 sec;   INR: 1.14 ratio                   PHYSICAL EXAM:  Vital Signs last 24 Hours   T(C): 36.9 (06-02-22 @ 04:50), Max: 36.9 (06-01-22 @ 21:25)  HR: 83 (06-02-22 @ 04:50) (82 - 88)  BP: 132/67 (06-02-22 @ 04:50) (115/55 - 132/67)  RR: 18 (06-02-22 @ 04:50) (18 - 18)  SpO2: 93% (06-02-22 @ 04:50) (92% - 94%)  Wt(kg): --    I&O's Summary   Patient seen and examined this am, in NAD, hemodynamically stable. Denies, sob, chest pain. Endorses generalized pain throughout body.       allopurinol 100 milliGRAM(s) Oral daily  colchicine 0.6 milliGRAM(s) Oral daily  folic acid 1 milliGRAM(s) Oral <User Schedule>  HYDROmorphone  Injectable 3 milliGRAM(s) IV Push every 4 hours PRN  lactated ringers. 1000 milliLiter(s) IV Continuous <Continuous>  lidocaine   4% Patch 1 Patch Transdermal daily  melatonin 3 milliGRAM(s) Oral at bedtime PRN  ondansetron Injectable 4 milliGRAM(s) IV Push every 8 hours PRN  polyethylene glycol 3350 17 Gram(s) Oral daily  senna 2 Tablet(s) Oral at bedtime                          6.2    10.03 )-----------( 269      ( 01 Jun 2022 06:52 )             16.9       Hemoglobin: 6.2 g/dL (06-01 @ 06:52)  Hemoglobin: 6.1 g/dL (05-31 @ 21:14)  Hemoglobin: 5.3 g/dL (05-31 @ 10:55)  Hemoglobin: 5.3 g/dL (05-31 @ 02:48)      06-01    139  |  111<H>  |  21<H>  ----------------------------<  93  4.7   |  25  |  0.83    Ca    9.5      01 Jun 2022 11:00  Phos  3.7     06-01  Mg     2.0     06-01    TPro  7.4  /  Alb  3.1<L>  /  TBili  8.1<H>  /  DBili  x   /  AST  150<H>  /  ALT  59  /  AlkPhos  125<H>  06-01    Creatinine Trend: 0.83<--, 0.84<--, 1.04<--, 1.36<--    COAGS: PT/INR - ( 02 Jun 2022 06:18 )   PT: 13.6 sec;   INR: 1.14 ratio           PHYSICAL EXAM:  Vital Signs last 24 Hours   T(C): 36.9 (06-02-22 @ 04:50), Max: 36.9 (06-01-22 @ 21:25)  HR: 83 (06-02-22 @ 04:50) (82 - 88)  BP: 132/67 (06-02-22 @ 04:50) (115/55 - 132/67)  RR: 18 (06-02-22 @ 04:50) (18 - 18)  SpO2: 93% (06-02-22 @ 04:50) (92% - 94%)  Wt(kg): --    I&O's Summary    Physical Exam:     General: No distress. Comfortable.  HEENT: EOM intact.  Neck: Neck supple. JVP not elevated. No masses  Chest: Clear to auscultation bilaterally  CV: s1 s2 RRR no murmurs, rubs or gallops   Abdomen: Soft, non-distended, non-tender  Extremity : + PP no c/c/e   Skin: No rashes or skin breakdown  Neurology: Alert and oriented times three. Sensation intact  Psych: Affect normal

## 2022-06-02 NOTE — PROGRESS NOTE ADULT - PROBLEM SELECTOR PLAN 2
- On admission, patient with Cr 1.36, baseline 0.7  - MATA most likely pre renal in the setting of acute cell crisis   - Monitor I/O's daily  - Avoid nephrotoxins, NSAIDS, ACEI and ARBS  - Started on IVF  - Monitor BMP daily - On admission, patient with Cr 1.36, baseline 0.7  - MATA most likely pre renal in the setting of acute cell crisis   - Cr 0.70 at baseline  - Avoid nephrotoxins, NSAIDS, ACEI and ARBS

## 2022-06-02 NOTE — PROGRESS NOTE ADULT - PROBLEM SELECTOR PLAN 3
- Patient has hx of cirrhosis  - Pending outpatient w/u due to multiple hospital readmissions   - Avoid Tylenol for now
- Patient has hx of cirrhosis  - Pending outpatient w/u due to multiple hospital readmissions   - Avoid Tylenol for now

## 2022-06-02 NOTE — PROGRESS NOTE ADULT - PROBLEM SELECTOR PLAN 5
- Patient has hx of opioid dependence  - Pain meds as per pain management recommendation   - Caution upon discharge when medically stable
- Patient has hx of opioid dependence  - Pain meds as per pain management recommendation   - Caution upon discharge when medically stable

## 2022-06-02 NOTE — PROGRESS NOTE ADULT - ASSESSMENT
TOLU VARGAS is a 42y Male who presents with a chief complaint of pain.    Sickle Cell Anemia  - Patient with baseline hemoglobin in the 5 to 7 range.  - Monitor CBC. Would hold off on transfusions unless clinically symptomatic. No PRBC indicated today  - IVF and pain control as needed.   - Bilirubin of 6.2 noted. This is in line with his baseline. Likely from ongoing sickle cell hemolysis.   - pain mgmt input apprecaited, pt has oxycodone at home    Acute Kidney Injury  - In clinic baseline creatinine has been between 0.8 and 1.0.  - Creatinine improved today  -- continue to monitor kidney functions      Will continue to follow.  On discharge, patient to follow-up with Dr. Shirley Jordan. OK to OK home today from heme/onc standpoint. D/w pt and primary team.

## 2022-06-02 NOTE — PROGRESS NOTE ADULT - ASSESSMENT
40 y/o M with past medical hx of gout, cirrhosis, opioid dependence and SCD recently hospitalized 2/24-2/26, 2/28-3/7, 3/11-3/17 is admitted to medicine for sickle cell crisis. Had a recent fall, CT Chest shows mild cortical irregularity of left lateral eight and ninth rib - new non displaced. Pain management following.    42 y/o M with past medical hx of gout, cirrhosis, opioid dependence and SCD recently hospitalized 2/24-2/26, 2/28-3/7, 3/11-3/17 is admitted to medicine for sickle cell crisis. Had a recent fall, CT Chest shows mild cortical irregularity of left lateral eight and ninth rib - new non displaced. Pain management following. Patient follows Dr. Lim (heme/onc). Hb 5.9 today; patient's baseline 5-7. As discussed with Dr. Perez, patient stable for discharge today with outpatient follow up next week at Dr Pan/Dr. Enciso's office.

## 2022-06-02 NOTE — DISCHARGE NOTE NURSING/CASE MANAGEMENT/SOCIAL WORK - PATIENT PORTAL LINK FT
You can access the FollowMyHealth Patient Portal offered by [facility] by registering at the following website: [email or web address]. By joining [facility]’s FollowMyHealth portal, you will also be able to view your health information using other applications (apps) compatible with our system.

## 2022-06-02 NOTE — DISCHARGE NOTE NURSING/CASE MANAGEMENT/SOCIAL WORK - NSDCPEFALRISK_GEN_ALL_CORE
For information on Fall & Injury Prevention, visit: [email or web address] OR  [email or web address] OR  https://www.cdc.gov/steadi/patient.html

## 2022-06-02 NOTE — PROGRESS NOTE ADULT - SUBJECTIVE AND OBJECTIVE BOX
Pt seen, feeling unchanged pain, mod controlled but close to baseline now, no other new complaints    MEDICATIONS  (STANDING):  allopurinol 100 milliGRAM(s) Oral daily  colchicine 0.6 milliGRAM(s) Oral daily  folic acid 1 milliGRAM(s) Oral <User Schedule>  lactated ringers. 1000 milliLiter(s) (70 mL/Hr) IV Continuous <Continuous>  lidocaine   4% Patch 1 Patch Transdermal daily  polyethylene glycol 3350 17 Gram(s) Oral daily  senna 2 Tablet(s) Oral at bedtime    MEDICATIONS  (PRN):  HYDROmorphone  Injectable 3 milliGRAM(s) IV Push every 4 hours PRN Severe Pain (7 - 10)  melatonin 3 milliGRAM(s) Oral at bedtime PRN Insomnia  ondansetron Injectable 4 milliGRAM(s) IV Push every 8 hours PRN Nausea and/or Vomiting      ROS  No fever, sweats, chills  No epistaxis, HA, sore throat  No CP, SOB, cough, sputum  No n/v/d, abd pain, melena, hematochezia  No edema  No rash  No anxiety  No joint pain, + rib pain  No bleeding, bruising  No dysuria, hematuria    Vital Signs Last 24 Hrs  T(C): 36.9 (02 Jun 2022 04:50), Max: 36.9 (01 Jun 2022 21:25)  T(F): 98.5 (02 Jun 2022 04:50), Max: 98.5 (02 Jun 2022 04:50)  HR: 83 (02 Jun 2022 04:50) (82 - 88)  BP: 132/67 (02 Jun 2022 04:50) (115/55 - 132/67)  BP(mean): --  RR: 18 (02 Jun 2022 04:50) (18 - 18)  SpO2: 93% (02 Jun 2022 04:50) (92% - 94%)    PE  NAD  Awake, alert  icteric  limited 2/2 covid pandemic                          5.9    8.64  )-----------( 251      ( 02 Jun 2022 06:18 )             see note      06-02    140  |  110<H>  |  20<H>  ----------------------------<  93  4.5   |  25  |  0.70    Ca    9.0      02 Jun 2022 06:18  Phos  3.7     06-01  Mg     2.0     06-01    TPro  x   /  Alb  x   /  TBili  8.4<H>  /  DBili  x   /  AST  x   /  ALT  x   /  AlkPhos  x   06-02

## 2022-06-02 NOTE — PROGRESS NOTE ADULT - PROBLEM SELECTOR PLAN 4
- Patient with hx of gout on colchicine   - C/w home meds
- Patient with hx of gout on colchicine   - C/w home meds

## 2022-06-23 ENCOUNTER — INPATIENT (INPATIENT)
Facility: HOSPITAL | Age: 42
LOS: 0 days | Discharge: HOME | DRG: 812 | End: 2022-06-24
Attending: INTERNAL MEDICINE | Admitting: INTERNAL MEDICINE
Payer: MEDICAID

## 2022-06-23 VITALS
WEIGHT: 188.94 LBS | HEART RATE: 90 BPM | DIASTOLIC BLOOD PRESSURE: 73 MMHG | RESPIRATION RATE: 18 BRPM | SYSTOLIC BLOOD PRESSURE: 120 MMHG | OXYGEN SATURATION: 95 % | TEMPERATURE: 99 F | HEIGHT: 69 IN

## 2022-06-23 DIAGNOSIS — Z90.49 ACQUIRED ABSENCE OF OTHER SPECIFIED PARTS OF DIGESTIVE TRACT: Chronic | ICD-10-CM

## 2022-06-23 DIAGNOSIS — D57.00 HB-SS DISEASE WITH CRISIS, UNSPECIFIED: ICD-10-CM

## 2022-06-23 DIAGNOSIS — D64.9 ANEMIA, UNSPECIFIED: ICD-10-CM

## 2022-06-23 DIAGNOSIS — M10.9 GOUT, UNSPECIFIED: ICD-10-CM

## 2022-06-23 DIAGNOSIS — Z29.9 ENCOUNTER FOR PROPHYLACTIC MEASURES, UNSPECIFIED: ICD-10-CM

## 2022-06-23 LAB
ALBUMIN SERPL ELPH-MCNC: 3.2 G/DL — LOW (ref 3.5–5)
ALP SERPL-CCNC: 147 U/L — HIGH (ref 40–120)
ALT FLD-CCNC: 65 U/L DA — HIGH (ref 10–60)
ANION GAP SERPL CALC-SCNC: 5 MMOL/L — SIGNIFICANT CHANGE UP (ref 5–17)
ANISOCYTOSIS BLD QL: SIGNIFICANT CHANGE UP
APPEARANCE UR: CLEAR — SIGNIFICANT CHANGE UP
APTT BLD: 60.3 SEC — HIGH (ref 27.5–35.5)
AST SERPL-CCNC: 96 U/L — HIGH (ref 10–40)
BACTERIA # UR AUTO: ABNORMAL /HPF
BASOPHILS # BLD AUTO: 0 K/UL — SIGNIFICANT CHANGE UP (ref 0–0.2)
BASOPHILS NFR BLD AUTO: 0 % — SIGNIFICANT CHANGE UP (ref 0–2)
BILIRUB SERPL-MCNC: 5.3 MG/DL — HIGH (ref 0.2–1.2)
BILIRUB UR-MCNC: NEGATIVE — SIGNIFICANT CHANGE UP
BLD GP AB SCN SERPL QL: SIGNIFICANT CHANGE UP
BUN SERPL-MCNC: 18 MG/DL — SIGNIFICANT CHANGE UP (ref 7–18)
CALCIUM SERPL-MCNC: 8.7 MG/DL — SIGNIFICANT CHANGE UP (ref 8.4–10.5)
CHLORIDE SERPL-SCNC: 109 MMOL/L — HIGH (ref 96–108)
CO2 SERPL-SCNC: 24 MMOL/L — SIGNIFICANT CHANGE UP (ref 22–31)
COLOR SPEC: YELLOW — SIGNIFICANT CHANGE UP
COMMENT - URINE: SIGNIFICANT CHANGE UP
CREAT SERPL-MCNC: 1.05 MG/DL — SIGNIFICANT CHANGE UP (ref 0.5–1.3)
DACRYOCYTES BLD QL SMEAR: SLIGHT — SIGNIFICANT CHANGE UP
DIFF PNL FLD: ABNORMAL
EGFR: 90 ML/MIN/1.73M2 — SIGNIFICANT CHANGE UP
ELLIPTOCYTES BLD QL SMEAR: SIGNIFICANT CHANGE UP
EOSINOPHIL # BLD AUTO: 0.17 K/UL — SIGNIFICANT CHANGE UP (ref 0–0.5)
EOSINOPHIL NFR BLD AUTO: 2 % — SIGNIFICANT CHANGE UP (ref 0–6)
EPI CELLS # UR: SIGNIFICANT CHANGE UP /HPF
FLUAV AG NPH QL: DETECTED
FLUBV AG NPH QL: SIGNIFICANT CHANGE UP
GLUCOSE SERPL-MCNC: 101 MG/DL — HIGH (ref 70–99)
GLUCOSE UR QL: NEGATIVE — SIGNIFICANT CHANGE UP
GRAN CASTS # UR COMP ASSIST: ABNORMAL /LPF
HCT VFR BLD CALC: 13.6 % — CRITICAL LOW (ref 39–50)
HGB BLD-MCNC: 5 G/DL — CRITICAL LOW (ref 13–17)
HYPOCHROMIA BLD QL: SLIGHT — SIGNIFICANT CHANGE UP
INR BLD: 1.08 RATIO — SIGNIFICANT CHANGE UP (ref 0.88–1.16)
KETONES UR-MCNC: NEGATIVE — SIGNIFICANT CHANGE UP
LACTATE SERPL-SCNC: 0.5 MMOL/L — LOW (ref 0.7–2)
LDH SERPL L TO P-CCNC: 641 U/L — HIGH (ref 120–225)
LEUKOCYTE ESTERASE UR-ACNC: NEGATIVE — SIGNIFICANT CHANGE UP
LYMPHOCYTES # BLD AUTO: 3.83 K/UL — HIGH (ref 1–3.3)
LYMPHOCYTES # BLD AUTO: 46 % — HIGH (ref 13–44)
MACROCYTES BLD QL: SLIGHT — SIGNIFICANT CHANGE UP
MANUAL SMEAR VERIFICATION: SIGNIFICANT CHANGE UP
MCHC RBC-ENTMCNC: 34 PG — SIGNIFICANT CHANGE UP (ref 27–34)
MCHC RBC-ENTMCNC: 36.8 GM/DL — HIGH (ref 32–36)
MCV RBC AUTO: 92.5 FL — SIGNIFICANT CHANGE UP (ref 80–100)
MICROCYTES BLD QL: SLIGHT — SIGNIFICANT CHANGE UP
MONOCYTES # BLD AUTO: 1 K/UL — HIGH (ref 0–0.9)
MONOCYTES NFR BLD AUTO: 12 % — SIGNIFICANT CHANGE UP (ref 2–14)
NEUTROPHILS # BLD AUTO: 3.33 K/UL — SIGNIFICANT CHANGE UP (ref 1.8–7.4)
NEUTROPHILS NFR BLD AUTO: 39 % — LOW (ref 43–77)
NEUTS BAND # BLD: 1 % — SIGNIFICANT CHANGE UP (ref 0–8)
NITRITE UR-MCNC: NEGATIVE — SIGNIFICANT CHANGE UP
NRBC # BLD: 2 /100 — HIGH (ref 0–0)
OVALOCYTES BLD QL SMEAR: SLIGHT — SIGNIFICANT CHANGE UP
PH UR: 6 — SIGNIFICANT CHANGE UP (ref 5–8)
PLAT MORPH BLD: NORMAL — SIGNIFICANT CHANGE UP
PLATELET # BLD AUTO: 262 K/UL — SIGNIFICANT CHANGE UP (ref 150–400)
PLATELET COUNT - ESTIMATE: NORMAL — SIGNIFICANT CHANGE UP
POIKILOCYTOSIS BLD QL AUTO: SIGNIFICANT CHANGE UP
POLYCHROMASIA BLD QL SMEAR: SLIGHT — SIGNIFICANT CHANGE UP
POTASSIUM SERPL-MCNC: 4.2 MMOL/L — SIGNIFICANT CHANGE UP (ref 3.5–5.3)
POTASSIUM SERPL-SCNC: 4.2 MMOL/L — SIGNIFICANT CHANGE UP (ref 3.5–5.3)
PROT SERPL-MCNC: 7.1 G/DL — SIGNIFICANT CHANGE UP (ref 6–8.3)
PROT UR-MCNC: 30 MG/DL
PROTHROM AB SERPL-ACNC: 12.9 SEC — SIGNIFICANT CHANGE UP (ref 10.5–13.4)
RBC # BLD: 1.47 M/UL — LOW (ref 4.2–5.8)
RBC # BLD: 1.47 M/UL — LOW (ref 4.2–5.8)
RBC # FLD: 23.2 % — HIGH (ref 10.3–14.5)
RBC BLD AUTO: ABNORMAL
RBC CASTS # UR COMP ASSIST: SIGNIFICANT CHANGE UP /HPF (ref 0–2)
RETICS #: 335.8 K/UL — HIGH (ref 25–125)
RETICS/RBC NFR: 21 % — HIGH (ref 0.5–2.5)
SARS-COV-2 RNA SPEC QL NAA+PROBE: SIGNIFICANT CHANGE UP
SCHISTOCYTES BLD QL AUTO: SLIGHT — SIGNIFICANT CHANGE UP
SICKLE CELLS BLD QL SMEAR: SIGNIFICANT CHANGE UP
SODIUM SERPL-SCNC: 138 MMOL/L — SIGNIFICANT CHANGE UP (ref 135–145)
SP GR SPEC: 1.01 — SIGNIFICANT CHANGE UP (ref 1.01–1.02)
UROBILINOGEN FLD QL: 1
WBC # BLD: 8.32 K/UL — SIGNIFICANT CHANGE UP (ref 3.8–10.5)
WBC # FLD AUTO: 8.32 K/UL — SIGNIFICANT CHANGE UP (ref 3.8–10.5)
WBC UR QL: SIGNIFICANT CHANGE UP /HPF (ref 0–5)

## 2022-06-23 PROCEDURE — 71045 X-RAY EXAM CHEST 1 VIEW: CPT | Mod: 26

## 2022-06-23 PROCEDURE — 99285 EMERGENCY DEPT VISIT HI MDM: CPT

## 2022-06-23 PROCEDURE — 70450 CT HEAD/BRAIN W/O DYE: CPT | Mod: 26

## 2022-06-23 RX ORDER — SODIUM CHLORIDE 9 MG/ML
3 INJECTION INTRAMUSCULAR; INTRAVENOUS; SUBCUTANEOUS EVERY 8 HOURS
Refills: 0 | Status: DISCONTINUED | OUTPATIENT
Start: 2022-06-23 | End: 2022-06-24

## 2022-06-23 RX ORDER — SODIUM CHLORIDE 9 MG/ML
1000 INJECTION INTRAMUSCULAR; INTRAVENOUS; SUBCUTANEOUS ONCE
Refills: 0 | Status: COMPLETED | OUTPATIENT
Start: 2022-06-23 | End: 2022-06-23

## 2022-06-23 RX ORDER — HYDROMORPHONE HYDROCHLORIDE 2 MG/ML
3 INJECTION INTRAMUSCULAR; INTRAVENOUS; SUBCUTANEOUS EVERY 6 HOURS
Refills: 0 | Status: DISCONTINUED | OUTPATIENT
Start: 2022-06-23 | End: 2022-06-23

## 2022-06-23 RX ORDER — HYDROMORPHONE HYDROCHLORIDE 2 MG/ML
1 INJECTION INTRAMUSCULAR; INTRAVENOUS; SUBCUTANEOUS ONCE
Refills: 0 | Status: DISCONTINUED | OUTPATIENT
Start: 2022-06-23 | End: 2022-06-23

## 2022-06-23 RX ORDER — FOLIC ACID 0.8 MG
1 TABLET ORAL DAILY
Refills: 0 | Status: DISCONTINUED | OUTPATIENT
Start: 2022-06-23 | End: 2022-06-24

## 2022-06-23 RX ORDER — DIPHENHYDRAMINE HCL 50 MG
50 CAPSULE ORAL ONCE
Refills: 0 | Status: COMPLETED | OUTPATIENT
Start: 2022-06-23 | End: 2022-06-23

## 2022-06-23 RX ORDER — HYDROMORPHONE HYDROCHLORIDE 2 MG/ML
3 INJECTION INTRAMUSCULAR; INTRAVENOUS; SUBCUTANEOUS EVERY 4 HOURS
Refills: 0 | Status: DISCONTINUED | OUTPATIENT
Start: 2022-06-23 | End: 2022-06-24

## 2022-06-23 RX ORDER — SODIUM CHLORIDE 9 MG/ML
1000 INJECTION INTRAMUSCULAR; INTRAVENOUS; SUBCUTANEOUS
Refills: 0 | Status: DISCONTINUED | OUTPATIENT
Start: 2022-06-23 | End: 2022-06-24

## 2022-06-23 RX ORDER — OXYCODONE AND ACETAMINOPHEN 5; 325 MG/1; MG/1
1 TABLET ORAL EVERY 4 HOURS
Refills: 0 | Status: DISCONTINUED | OUTPATIENT
Start: 2022-06-23 | End: 2022-06-24

## 2022-06-23 RX ORDER — ALLOPURINOL 300 MG
100 TABLET ORAL DAILY
Refills: 0 | Status: DISCONTINUED | OUTPATIENT
Start: 2022-06-23 | End: 2022-06-24

## 2022-06-23 RX ORDER — ONDANSETRON 8 MG/1
4 TABLET, FILM COATED ORAL EVERY 6 HOURS
Refills: 0 | Status: DISCONTINUED | OUTPATIENT
Start: 2022-06-23 | End: 2022-06-24

## 2022-06-23 RX ORDER — COLCHICINE 0.6 MG
0.6 TABLET ORAL DAILY
Refills: 0 | Status: DISCONTINUED | OUTPATIENT
Start: 2022-06-23 | End: 2022-06-24

## 2022-06-23 RX ADMIN — HYDROMORPHONE HYDROCHLORIDE 3 MILLIGRAM(S): 2 INJECTION INTRAMUSCULAR; INTRAVENOUS; SUBCUTANEOUS at 20:54

## 2022-06-23 RX ADMIN — Medication 1 MILLIGRAM(S): at 11:38

## 2022-06-23 RX ADMIN — HYDROMORPHONE HYDROCHLORIDE 1 MILLIGRAM(S): 2 INJECTION INTRAMUSCULAR; INTRAVENOUS; SUBCUTANEOUS at 05:59

## 2022-06-23 RX ADMIN — Medication 100 MILLIGRAM(S): at 11:38

## 2022-06-23 RX ADMIN — SODIUM CHLORIDE 100 MILLILITER(S): 9 INJECTION INTRAMUSCULAR; INTRAVENOUS; SUBCUTANEOUS at 20:51

## 2022-06-23 RX ADMIN — HYDROMORPHONE HYDROCHLORIDE 1 MILLIGRAM(S): 2 INJECTION INTRAMUSCULAR; INTRAVENOUS; SUBCUTANEOUS at 04:16

## 2022-06-23 RX ADMIN — SODIUM CHLORIDE 3 MILLILITER(S): 9 INJECTION INTRAMUSCULAR; INTRAVENOUS; SUBCUTANEOUS at 14:46

## 2022-06-23 RX ADMIN — HYDROMORPHONE HYDROCHLORIDE 3 MILLIGRAM(S): 2 INJECTION INTRAMUSCULAR; INTRAVENOUS; SUBCUTANEOUS at 14:41

## 2022-06-23 RX ADMIN — Medication 0.6 MILLIGRAM(S): at 11:37

## 2022-06-23 RX ADMIN — ONDANSETRON 4 MILLIGRAM(S): 8 TABLET, FILM COATED ORAL at 15:42

## 2022-06-23 RX ADMIN — HYDROMORPHONE HYDROCHLORIDE 1 MILLIGRAM(S): 2 INJECTION INTRAMUSCULAR; INTRAVENOUS; SUBCUTANEOUS at 07:15

## 2022-06-23 RX ADMIN — SODIUM CHLORIDE 3 MILLILITER(S): 9 INJECTION INTRAMUSCULAR; INTRAVENOUS; SUBCUTANEOUS at 07:15

## 2022-06-23 RX ADMIN — SODIUM CHLORIDE 1000 MILLILITER(S): 9 INJECTION INTRAMUSCULAR; INTRAVENOUS; SUBCUTANEOUS at 04:02

## 2022-06-23 RX ADMIN — OXYCODONE AND ACETAMINOPHEN 1 TABLET(S): 5; 325 TABLET ORAL at 11:37

## 2022-06-23 RX ADMIN — Medication 75 MILLIGRAM(S): at 18:25

## 2022-06-23 RX ADMIN — HYDROMORPHONE HYDROCHLORIDE 3 MILLIGRAM(S): 2 INJECTION INTRAMUSCULAR; INTRAVENOUS; SUBCUTANEOUS at 21:09

## 2022-06-23 RX ADMIN — Medication 50 MILLIGRAM(S): at 08:38

## 2022-06-23 RX ADMIN — HYDROMORPHONE HYDROCHLORIDE 1 MILLIGRAM(S): 2 INJECTION INTRAMUSCULAR; INTRAVENOUS; SUBCUTANEOUS at 04:46

## 2022-06-23 RX ADMIN — OXYCODONE AND ACETAMINOPHEN 1 TABLET(S): 5; 325 TABLET ORAL at 12:45

## 2022-06-23 RX ADMIN — SODIUM CHLORIDE 3 MILLILITER(S): 9 INJECTION INTRAMUSCULAR; INTRAVENOUS; SUBCUTANEOUS at 20:34

## 2022-06-23 RX ADMIN — HYDROMORPHONE HYDROCHLORIDE 3 MILLIGRAM(S): 2 INJECTION INTRAMUSCULAR; INTRAVENOUS; SUBCUTANEOUS at 15:43

## 2022-06-23 NOTE — ED ADULT TRIAGE NOTE - CHIEF COMPLAINT QUOTE
c/o fever ,generalized pain ( back ,b/l arm. b/l lower ext) dizzy & nausea  x since last night .hx sickle cell

## 2022-06-23 NOTE — ED PROVIDER NOTE - OBJECTIVE STATEMENT
41 y/o male with history of sickle cell disease, gout, p/w 1 day of lower back pain typical of his sickle cell pain crisis. Patient states that he had a temperature of 103F yesterday with mild nausea and denies any other infectious symptoms. Patient denies cough, chest pain, shortness of breath, vomiting, diarrhea, urinary symptoms, or any other recent illnesses and hospitalizations.

## 2022-06-23 NOTE — ED PROVIDER NOTE - PHYSICAL EXAMINATION
Vital Signs Reviewed  GEN: Comfortable, NAD, AAOx3  HEENT: NCAT, MMM, Neck Supple, scleral icterus  RESP: CTAB, No rales/rhonchi/wheezing  CV: RRR, S1S2, No murmurs  ABD: No TTP, Soft, ND, No masses, No CVA Tenderness  Extrem/Skin: Equal pulses bilat, No cyanosis/edema/rashes  Neuro: No focal deficits

## 2022-06-23 NOTE — ED PROVIDER NOTE - CLINICAL SUMMARY MEDICAL DECISION MAKING FREE TEXT BOX
Patient p/w typical pain crisis per patient. Scleral icterus on exam. Labs and CXR pending. Patient stable and will reassess. Patient p/w typical pain crisis per patient. Scleral icterus on exam. Labs and CXR pending. Patient stable and will reassess.    Labs showing low Hb, consented for blood. Pt in persistent pain. Admitting for anemia and pain crisis. Pt stable and endorsed to MAR under Dr Panchal's service.

## 2022-06-23 NOTE — PATIENT PROFILE ADULT - FALL HARM RISK - UNIVERSAL INTERVENTIONS
Bed in lowest position, wheels locked, appropriate side rails in place/Call bell, personal items and telephone in reach/Instruct patient to call for assistance before getting out of bed or chair/Non-slip footwear when patient is out of bed/[place] to call system/Physically safe environment - no spills, clutter or unnecessary equipment/Purposeful Proactive Rounding/Room/bathroom lighting operational, light cord in reach

## 2022-06-23 NOTE — CONSULT NOTE ADULT - ASSESSMENT
VOC -- acute sickle crisis, may had been exacerbated by flu  -- aggressive pain meds, IVF  -- dilaudid 3mg PRN at this time  -- cont folic acid  -- s/p PRBC this am, if stable, hold off on additional transfusions for now and reassess in am  -- try to avoid further PRBC if hgb remains >6 and pt is stable or improving  -- monitor CBC, CMP, LFTs    flu -- sx mgmt  -- check CXR  -- on RA    Pt to f/u with Dr Jordan after d/c. D/w pt, primary team, will follow.

## 2022-06-23 NOTE — ED ADULT NURSE NOTE - OBJECTIVE STATEMENT
Pt came to er with c/c of fever, generalized pain ( back ,b/l arm. b/l lower ext) dizziness & nausea  x since last night .PMHx sickle cell Pt came to er with c/c of fever, generalized pain ( back ,b/l arm. b/l lower ext) dizziness & nausea  x since last night .PMHx sickle cell; Pt noted to have a chest port to right chest

## 2022-06-23 NOTE — CONSULT NOTE ADULT - SUBJECTIVE AND OBJECTIVE BOX
Pt well known to our service, pt of Dr Jordan. Briefly, 42M, w/ PMH of SCD and gout, presents with low back pain since yesterday. He reports 10/10, sharp, constant, non-radiating back pain. He states he has these symptoms often, and the last symptoms were a month ago. He is unable to take hydroxyurea 2/2 priapism. He was also found to have acute influenza. Hgb down to 5.0, just received 1U PRBC this am at the time I saw him. Feeling better, still with pain, askin for pain meds, in ED.      PAST MEDICAL & SURGICAL HISTORY:  Sickle cell anemia      Gout      History of cholecystectomy          FAMILY HISTORY:  Family history of sickle cell trait (Father, Mother)        Alochol: Denied  Smoking: Nonsmoker  Drug Use: Denied  Marital Status:         Allergies    ceftriaxone (Anaphylaxis)  hydroxyurea (Other)  penicillin (Pruritus)  piperacillin-tazobactam (Urticaria)    Intolerances        MEDICATIONS  (STANDING):  allopurinol 100 milliGRAM(s) Oral daily  colchicine 0.6 milliGRAM(s) Oral daily  folic acid 1 milliGRAM(s) Oral daily  oseltamivir 75 milliGRAM(s) Oral two times a day  sodium chloride 0.9% lock flush 3 milliLiter(s) IV Push every 8 hours  sodium chloride 0.9%. 1000 milliLiter(s) (100 mL/Hr) IV Continuous <Continuous>    MEDICATIONS  (PRN):  HYDROmorphone  Injectable 3 milliGRAM(s) IV Push every 6 hours PRN Severe Pain (7 - 10)  oxycodone    5 mG/acetaminophen 325 mG 1 Tablet(s) Oral every 4 hours PRN Moderate Pain (4 - 6)      ROS  No fever, sweats, chills  No epistaxis, HA, sore throat  No CP, SOB, cough, sputum  No n/v/d, abd pain, melena, hematochezia  No edema  No rash  No anxiety  No bleeding, bruising  No dysuria, hematuria    T(C): 36.8 (06-23-22 @ 11:37), Max: 37.3 (06-23-22 @ 09:00)  HR: 70 (06-23-22 @ 11:37) (70 - 90)  BP: 134/84 (06-23-22 @ 11:37) (115/70 - 137/83)  RR: 18 (06-23-22 @ 11:37) (17 - 18)  SpO2: 96% (06-23-22 @ 11:37) (93% - 99%)  Wt(kg): --    PE  NAD  Awake, alert  icteric  able to speak in full sentences  limited 2/2 covid pandemic                          5.0    8.32  )-----------( 262      ( 23 Jun 2022 04:04 )             13.6       06-23    138  |  109<H>  |  18  ----------------------------<  101<H>  4.2   |  24  |  1.05    Ca    8.7      23 Jun 2022 04:04    TPro  7.1  /  Alb  3.2<L>  /  TBili  5.3<H>  /  DBili  x   /  AST  96<H>  /  ALT  65<H>  /  AlkPhos  147<H>  06-23

## 2022-06-23 NOTE — H&P ADULT - ASSESSMENT
Patient is a 42M, w/ PMH of SCD and gout, presents with low back pain since yesterday. Admitted for sickle cell crisis.

## 2022-06-23 NOTE — H&P ADULT - ATTENDING COMMENTS
42M, w/ PMH of SCD and gout, presents with low back pain since yesterday. He reports 10/10, sharp, constant, non-radiating back pain. He states he has these symptoms often, and the last symptoms were a month ago. He is unable to take hydroxyurea 2/2 priapism. He denies fever, chills, n/v, chest pain, SOB, abdominal pain, urinary or bowel movement changes. Patient with baseline hemoglobin in the 5 to 7 range.      assessment  --  sickle cell pain crises, symptomatic anemia, flu a, h/o SCD and gout    plan  --  admit to med, pain control, s/p 1u pRBC, tamiflu x 5 days, cont preadmit home meds, gi and dvt prophylaxis  cbc, bmp, mg, phos, lipids, tsh, bld cx, ua, ucx, retic count, parvo virus b19    heme onc cons

## 2022-06-23 NOTE — H&P ADULT - PROBLEM SELECTOR PLAN 1
- h/o sickle cell crisis, not on hydroxyurea due to h/o priapism   - multiple h/o crisis, last crisis a month ago  - hgb 5.0 (last hgb 5.9 on Jun 02, 2022)  - bili 5.2  - s/p 1u pRBC  - c/w pain management  - f/u CXR, retic, parvo, Ucx, Bcx

## 2022-06-23 NOTE — H&P ADULT - HISTORY OF PRESENT ILLNESS
Patient is a 42M, w/ PMH of SCD and gout, presents with low back pain since yesterday. He reports 10/10, sharp, constant, non-radiating back pain. He states he has these symptoms often, and the last symptoms were a month ago. He is unable to take hydroxyurea 2/2 priapism. He denies fever, chills, n/v, chest pain, SOB, abdominal pain, urinary or bowel movement changes.

## 2022-06-23 NOTE — CHART NOTE - NSCHARTNOTEFT_GEN_A_CORE
EVENT: Notified by RN, pt complaining about the frequency of pain medication    HPI: 42M, w/ PMH of SCD and gout, presents with low back pain since yesterday. Admitted for sickle cell crisis.    SUBJECTIVE: Pt is A&Ox3, pleasant. He reports pain, and states when hospitalized he usually receives his IV Dilaudid q 4 hours PRN instead of the ordered q6 PRN.   Upon review of chart, he was last seen by pain management during previous admission on June 7 and was ordered for Dilaudid IV q4 PRN for severe pain.     OBJECTIVE:  Vital Signs Last 24 Hrs  T(C): 36.4 (23 Jun 2022 19:03), Max: 37.3 (23 Jun 2022 09:00)  T(F): 97.6 (23 Jun 2022 19:03), Max: 99.2 (23 Jun 2022 09:00)  HR: 68 (23 Jun 2022 19:03) (66 - 90)  BP: 135/73 (23 Jun 2022 19:03) (115/70 - 137/83)  BP(mean): --  RR: 18 (23 Jun 2022 19:03) (17 - 18)  SpO2: 100% (23 Jun 2022 19:03) (93% - 100%)    PHYSICAL EXAM:  Neuro: Awake and alert, oriented to person, place, and time  Cardiovascular: + S1, S2, no murmurs, rubs, or bruits  Respiratory: clear to auscultation bilaterally with good air entry   GI: Abdomen soft, non-tender, bowel sounds present   : Non distended;   Skin: warm and dry; no rash      LABS:                        5.0    8.32  )-----------( 262      ( 23 Jun 2022 04:04 )             13.6     06-23    138  |  109<H>  |  18  ----------------------------<  101<H>  4.2   |  24  |  1.05    Ca    8.7      23 Jun 2022 04:04    TPro  7.1  /  Alb  3.2<L>  /  TBili  5.3<H>  /  DBili  x   /  AST  96<H>  /  ALT  65<H>  /  AlkPhos  147<H>  06-23        EKG:   IMAGING:    ASSESSMENT/PROBLEM: Chronic pain in the setting of sickle cell crisis     PLAN:     -Dilaudid 3 mg IVP changed to q4 PRN for severe pain  -Continue Percocet q4 for moderate pain  -Continue current/supportive measures    FOLLOW UP / RESULT:    -Monitor effectiveness of pain medication  -Reassess pain per hospital policy

## 2022-06-24 ENCOUNTER — TRANSCRIPTION ENCOUNTER (OUTPATIENT)
Age: 42
End: 2022-06-24

## 2022-06-24 VITALS
HEART RATE: 66 BPM | TEMPERATURE: 98 F | OXYGEN SATURATION: 93 % | RESPIRATION RATE: 18 BRPM | DIASTOLIC BLOOD PRESSURE: 79 MMHG | SYSTOLIC BLOOD PRESSURE: 130 MMHG

## 2022-06-24 LAB
ALBUMIN SERPL ELPH-MCNC: 3.1 G/DL — LOW (ref 3.5–5)
ALP SERPL-CCNC: 150 U/L — HIGH (ref 40–120)
ALT FLD-CCNC: 67 U/L DA — HIGH (ref 10–60)
ANION GAP SERPL CALC-SCNC: 6 MMOL/L — SIGNIFICANT CHANGE UP (ref 5–17)
AST SERPL-CCNC: 103 U/L — HIGH (ref 10–40)
BASOPHILS # BLD AUTO: 0.08 K/UL — SIGNIFICANT CHANGE UP (ref 0–0.2)
BASOPHILS NFR BLD AUTO: 1.1 % — SIGNIFICANT CHANGE UP (ref 0–2)
BILIRUB DIRECT SERPL-MCNC: 2.7 MG/DL — HIGH (ref 0–0.3)
BILIRUB SERPL-MCNC: 5.9 MG/DL — HIGH (ref 0.2–1.2)
BUN SERPL-MCNC: 14 MG/DL — SIGNIFICANT CHANGE UP (ref 7–18)
CALCIUM SERPL-MCNC: 8.3 MG/DL — LOW (ref 8.4–10.5)
CHLORIDE SERPL-SCNC: 113 MMOL/L — HIGH (ref 96–108)
CO2 SERPL-SCNC: 22 MMOL/L — SIGNIFICANT CHANGE UP (ref 22–31)
CREAT SERPL-MCNC: 0.8 MG/DL — SIGNIFICANT CHANGE UP (ref 0.5–1.3)
EGFR: 113 ML/MIN/1.73M2 — SIGNIFICANT CHANGE UP
EOSINOPHIL # BLD AUTO: 0.15 K/UL — SIGNIFICANT CHANGE UP (ref 0–0.5)
EOSINOPHIL NFR BLD AUTO: 2 % — SIGNIFICANT CHANGE UP (ref 0–6)
GLUCOSE SERPL-MCNC: 88 MG/DL — SIGNIFICANT CHANGE UP (ref 70–99)
HCT VFR BLD CALC: 16.4 % — CRITICAL LOW (ref 39–50)
HCT VFR BLD CALC: 16.8 % — CRITICAL LOW (ref 39–50)
HGB BLD-MCNC: 5.9 G/DL — CRITICAL LOW (ref 13–17)
HGB BLD-MCNC: 6 G/DL — CRITICAL LOW (ref 13–17)
IMM GRANULOCYTES NFR BLD AUTO: 0.4 % — SIGNIFICANT CHANGE UP (ref 0–1.5)
LYMPHOCYTES # BLD AUTO: 2.94 K/UL — SIGNIFICANT CHANGE UP (ref 1–3.3)
LYMPHOCYTES # BLD AUTO: 39.8 % — SIGNIFICANT CHANGE UP (ref 13–44)
MAGNESIUM SERPL-MCNC: 2 MG/DL — SIGNIFICANT CHANGE UP (ref 1.6–2.6)
MCHC RBC-ENTMCNC: 32.1 PG — SIGNIFICANT CHANGE UP (ref 27–34)
MCHC RBC-ENTMCNC: 32.3 PG — SIGNIFICANT CHANGE UP (ref 27–34)
MCHC RBC-ENTMCNC: 35.7 GM/DL — SIGNIFICANT CHANGE UP (ref 32–36)
MCHC RBC-ENTMCNC: 36 GM/DL — SIGNIFICANT CHANGE UP (ref 32–36)
MCV RBC AUTO: 89.1 FL — SIGNIFICANT CHANGE UP (ref 80–100)
MCV RBC AUTO: 90.3 FL — SIGNIFICANT CHANGE UP (ref 80–100)
MONOCYTES # BLD AUTO: 0.98 K/UL — HIGH (ref 0–0.9)
MONOCYTES NFR BLD AUTO: 13.3 % — SIGNIFICANT CHANGE UP (ref 2–14)
NEUTROPHILS # BLD AUTO: 3.21 K/UL — SIGNIFICANT CHANGE UP (ref 1.8–7.4)
NEUTROPHILS NFR BLD AUTO: 43.4 % — SIGNIFICANT CHANGE UP (ref 43–77)
NRBC # BLD: 2 /100 WBCS — HIGH (ref 0–0)
NRBC # BLD: 2 /100 WBCS — HIGH (ref 0–0)
PHOSPHATE SERPL-MCNC: 3.3 MG/DL — SIGNIFICANT CHANGE UP (ref 2.5–4.5)
PLATELET # BLD AUTO: 242 K/UL — SIGNIFICANT CHANGE UP (ref 150–400)
PLATELET # BLD AUTO: 247 K/UL — SIGNIFICANT CHANGE UP (ref 150–400)
POTASSIUM SERPL-MCNC: 4.7 MMOL/L — SIGNIFICANT CHANGE UP (ref 3.5–5.3)
POTASSIUM SERPL-SCNC: 4.7 MMOL/L — SIGNIFICANT CHANGE UP (ref 3.5–5.3)
PROT SERPL-MCNC: 6.6 G/DL — SIGNIFICANT CHANGE UP (ref 6–8.3)
RBC # BLD: 1.84 M/UL — LOW (ref 4.2–5.8)
RBC # BLD: 1.84 M/UL — LOW (ref 4.2–5.8)
RBC # BLD: 1.86 M/UL — LOW (ref 4.2–5.8)
RBC # FLD: 20.5 % — HIGH (ref 10.3–14.5)
RBC # FLD: 20.6 % — HIGH (ref 10.3–14.5)
RETICS #: 270.2 K/UL — HIGH (ref 25–125)
RETICS/RBC NFR: 14.9 % — HIGH (ref 0.5–2.5)
SODIUM SERPL-SCNC: 141 MMOL/L — SIGNIFICANT CHANGE UP (ref 135–145)
WBC # BLD: 6.95 K/UL — SIGNIFICANT CHANGE UP (ref 3.8–10.5)
WBC # BLD: 7.39 K/UL — SIGNIFICANT CHANGE UP (ref 3.8–10.5)
WBC # FLD AUTO: 6.95 K/UL — SIGNIFICANT CHANGE UP (ref 3.8–10.5)
WBC # FLD AUTO: 7.39 K/UL — SIGNIFICANT CHANGE UP (ref 3.8–10.5)

## 2022-06-24 RX ORDER — OXYCODONE HYDROCHLORIDE 5 MG/1
30 TABLET ORAL EVERY 4 HOURS
Refills: 0 | Status: DISCONTINUED | OUTPATIENT
Start: 2022-06-24 | End: 2022-06-24

## 2022-06-24 RX ADMIN — SODIUM CHLORIDE 100 MILLILITER(S): 9 INJECTION INTRAMUSCULAR; INTRAVENOUS; SUBCUTANEOUS at 01:25

## 2022-06-24 RX ADMIN — HYDROMORPHONE HYDROCHLORIDE 3 MILLIGRAM(S): 2 INJECTION INTRAMUSCULAR; INTRAVENOUS; SUBCUTANEOUS at 01:30

## 2022-06-24 RX ADMIN — SODIUM CHLORIDE 100 MILLILITER(S): 9 INJECTION INTRAMUSCULAR; INTRAVENOUS; SUBCUTANEOUS at 05:44

## 2022-06-24 RX ADMIN — HYDROMORPHONE HYDROCHLORIDE 3 MILLIGRAM(S): 2 INJECTION INTRAMUSCULAR; INTRAVENOUS; SUBCUTANEOUS at 01:20

## 2022-06-24 RX ADMIN — OXYCODONE HYDROCHLORIDE 30 MILLIGRAM(S): 5 TABLET ORAL at 10:48

## 2022-06-24 RX ADMIN — HYDROMORPHONE HYDROCHLORIDE 3 MILLIGRAM(S): 2 INJECTION INTRAMUSCULAR; INTRAVENOUS; SUBCUTANEOUS at 05:38

## 2022-06-24 RX ADMIN — SODIUM CHLORIDE 3 MILLILITER(S): 9 INJECTION INTRAMUSCULAR; INTRAVENOUS; SUBCUTANEOUS at 05:01

## 2022-06-24 RX ADMIN — OXYCODONE HYDROCHLORIDE 30 MILLIGRAM(S): 5 TABLET ORAL at 10:12

## 2022-06-24 RX ADMIN — Medication 75 MILLIGRAM(S): at 05:38

## 2022-06-24 NOTE — DISCHARGE NOTE PROVIDER - NSDCMRMEDTOKEN_GEN_ALL_CORE_FT
allopurinol 100 mg oral tablet: orally once a day  colchicine 0.6 mg oral tablet: 1 tab(s) orally once a day  folic acid 1 mg oral tablet: 1 tab(s) orally 2 times a day  oseltamivir 75 mg oral capsule: 1 cap(s) orally 2 times a day

## 2022-06-24 NOTE — DISCHARGE NOTE PROVIDER - HOSPITAL COURSE
HPI:  Patient is a 42M, w/ PMH of SCD and gout, presents with low back pain since yesterday. He reports 10/10, sharp, constant, non-radiating back pain. He states he has these symptoms often, and the last symptoms were a month ago.    given 1 unit of prbc, repeat hgb 5.9, stable for discharge, no shortness of breath  pain improved, pt will follow up outpt with Dr. Jordan    Given patient's improved clinical status and current hemodynamic stability, decision was made to discharge. Please note that this a brief summary of hospital course please refer to daily progress notes and consult notes for full course and events

## 2022-06-24 NOTE — CHART NOTE - NSCHARTNOTEFT_GEN_A_CORE
EVENT: Notified by RN, repeat Hgb s/p 1 unit of PRBC     HPI: 42M, w/ PMH of SCD and gout, presents with low back pain since yesterday. Admitted for sickle cell crisis.    SUBJECTIVE: Pt is A&Ox3, with no complaints. NAD.     OBJECTIVE:  Vital Signs Last 24 Hrs  T(C): 36.4 (23 Jun 2022 21:26), Max: 37.3 (23 Jun 2022 09:00)  T(F): 97.5 (23 Jun 2022 21:26), Max: 99.2 (23 Jun 2022 09:00)  HR: 72 (23 Jun 2022 21:26) (66 - 90)  BP: 127/69 (23 Jun 2022 21:26) (115/70 - 137/83)  BP(mean): --  RR: 18 (23 Jun 2022 21:26) (17 - 18)  SpO2: 96% (23 Jun 2022 21:26) (93% - 100%)    PHYSICAL EXAM:  Neuro: Awake and alert, oriented to person, place, and time  Cardiovascular: + S1, S2, no murmurs, rubs, or bruits  Respiratory: clear to auscultation bilaterally with good air entry   GI: Abdomen soft, non-tender, bowel sounds present   : Non distended;   Skin: warm and dry; no rash      LABS:                        6.0    6.95  )-----------( 247      ( 24 Jun 2022 01:28 )             16.8     06-23    138  |  109<H>  |  18  ----------------------------<  101<H>  4.2   |  24  |  1.05    Ca    8.7      23 Jun 2022 04:04    TPro  7.1  /  Alb  3.2<L>  /  TBili  5.3<H>  /  DBili  x   /  AST  96<H>  /  ALT  65<H>  /  AlkPhos  147<H>  06-23        EKG:   IMAGING:    ASSESSMENT/PROBLEM: Chronic anemia likely related to underlying sickle anemia     PLAN:     -Will hold off on additional transfusion of PRBCs for now, as pt remains asymptomatic per Heme/ Onc  -F/U CBC in AM  -Heme/onc recs appreciated   -Continue current/supportive care    FOLLOW UP / RESULT:    -F/U CBC this AM EVENT: Notified by RN, repeat Hgb s/p 1 unit of PRBC     HPI: 42M, w/ PMH of SCD and gout, presents with low back pain since yesterday. Admitted for sickle cell crisis.    SUBJECTIVE: Pt is A&Ox3, with no complaints. NAD. Upon review of chart, baseline Hgb noted to be around 5 to 7.     OBJECTIVE:  Vital Signs Last 24 Hrs  T(C): 36.4 (23 Jun 2022 21:26), Max: 37.3 (23 Jun 2022 09:00)  T(F): 97.5 (23 Jun 2022 21:26), Max: 99.2 (23 Jun 2022 09:00)  HR: 72 (23 Jun 2022 21:26) (66 - 90)  BP: 127/69 (23 Jun 2022 21:26) (115/70 - 137/83)  BP(mean): --  RR: 18 (23 Jun 2022 21:26) (17 - 18)  SpO2: 96% (23 Jun 2022 21:26) (93% - 100%)    PHYSICAL EXAM:  Neuro: Awake and alert, oriented to person, place, and time  Cardiovascular: + S1, S2, no murmurs, rubs, or bruits  Respiratory: clear to auscultation bilaterally with good air entry   GI: Abdomen soft, non-tender, bowel sounds present   : Non distended;   Skin: warm and dry; no rash      LABS:                        6.0    6.95  )-----------( 247      ( 24 Jun 2022 01:28 )             16.8     06-23    138  |  109<H>  |  18  ----------------------------<  101<H>  4.2   |  24  |  1.05    Ca    8.7      23 Jun 2022 04:04    TPro  7.1  /  Alb  3.2<L>  /  TBili  5.3<H>  /  DBili  x   /  AST  96<H>  /  ALT  65<H>  /  AlkPhos  147<H>  06-23        EKG:   IMAGING:    ASSESSMENT/PROBLEM: Chronic anemia likely related to underlying sickle anemia     PLAN:     -Will hold off on additional transfusion of PRBCs for now, as pt remains asymptomatic per Heme/ Onc. Known baseline Hgb between 5 to 7.   -F/U CBC in AM  -Heme/onc recs appreciated   -Continue current/supportive care    FOLLOW UP / RESULT:    -CBC results EVENT: Notified by RN, repeat Hgb s/p 1 unit of PRBC     HPI: 42M, w/ PMH of SCD and gout, presents with low back pain since yesterday. Admitted for sickle cell crisis.    SUBJECTIVE: Pt is A&Ox3, with no complaints. NAD. Upon review of chart, baseline Hgb noted to be around 5 to 7.     OBJECTIVE:  Vital Signs Last 24 Hrs  T(C): 36.4 (23 Jun 2022 21:26), Max: 37.3 (23 Jun 2022 09:00)  T(F): 97.5 (23 Jun 2022 21:26), Max: 99.2 (23 Jun 2022 09:00)  HR: 72 (23 Jun 2022 21:26) (66 - 90)  BP: 127/69 (23 Jun 2022 21:26) (115/70 - 137/83)  BP(mean): --  RR: 18 (23 Jun 2022 21:26) (17 - 18)  SpO2: 96% (23 Jun 2022 21:26) (93% - 100%)    PHYSICAL EXAM:  Neuro: Awake and alert, oriented to person, place, and time  Cardiovascular: + S1, S2, no murmurs, rubs, or bruits  Respiratory: clear to auscultation bilaterally with good air entry   GI: Abdomen soft, non-tender, bowel sounds present   : Non distended;   Skin: warm and dry; no rash      LABS:                        6.0    6.95  )-----------( 247      ( 24 Jun 2022 01:28 )             16.8     06-23    138  |  109<H>  |  18  ----------------------------<  101<H>  4.2   |  24  |  1.05    Ca    8.7      23 Jun 2022 04:04    TPro  7.1  /  Alb  3.2<L>  /  TBili  5.3<H>  /  DBili  x   /  AST  96<H>  /  ALT  65<H>  /  AlkPhos  147<H>  06-23        EKG:   IMAGING:    ASSESSMENT/PROBLEM: Chronic anemia likely related to underlying sickle cell disease     PLAN:     -Will hold off on additional transfusion of PRBCs for now, as pt remains asymptomatic per Heme/ Onc. Known baseline Hgb between 5 to 7.   -F/U CBC in AM  -Heme/onc recs appreciated   -Continue current/supportive care    FOLLOW UP / RESULT:    -CBC results EVENT: Notified by RN, repeat H/H of 6.0/16.8 s/p 1 unit of PRBC     HPI: 42M, w/ PMH of SCD and gout, presents with low back pain since yesterday. Admitted for sickle cell crisis.    SUBJECTIVE: Pt is A&Ox3, with no complaints. NAD. Upon review of chart, baseline Hgb noted to be around 5 to 7.     OBJECTIVE:  Vital Signs Last 24 Hrs  T(C): 36.4 (23 Jun 2022 21:26), Max: 37.3 (23 Jun 2022 09:00)  T(F): 97.5 (23 Jun 2022 21:26), Max: 99.2 (23 Jun 2022 09:00)  HR: 72 (23 Jun 2022 21:26) (66 - 90)  BP: 127/69 (23 Jun 2022 21:26) (115/70 - 137/83)  BP(mean): --  RR: 18 (23 Jun 2022 21:26) (17 - 18)  SpO2: 96% (23 Jun 2022 21:26) (93% - 100%)    PHYSICAL EXAM:  Neuro: Awake and alert, oriented to person, place, and time  Cardiovascular: + S1, S2, no murmurs, rubs, or bruits  Respiratory: clear to auscultation bilaterally with good air entry   GI: Abdomen soft, non-tender, bowel sounds present   : Non distended;   Skin: warm and dry; no rash      LABS:                        6.0    6.95  )-----------( 247      ( 24 Jun 2022 01:28 )             16.8     06-23    138  |  109<H>  |  18  ----------------------------<  101<H>  4.2   |  24  |  1.05    Ca    8.7      23 Jun 2022 04:04    TPro  7.1  /  Alb  3.2<L>  /  TBili  5.3<H>  /  DBili  x   /  AST  96<H>  /  ALT  65<H>  /  AlkPhos  147<H>  06-23        EKG:   IMAGING:    ASSESSMENT/PROBLEM: Chronic anemia likely related to underlying sickle cell disease     PLAN:     -Will hold off on additional transfusion of PRBCs for now, as pt remains asymptomatic per Heme/ Onc. Known baseline Hgb between 5 to 7.   -F/U CBC in AM  -Heme/onc recs appreciated   -Continue current/supportive care    FOLLOW UP / RESULT:    -CBC results

## 2022-06-24 NOTE — PROGRESS NOTE ADULT - ASSESSMENT
· Assessment	  VOC -- acute sickle crisis, may had been exacerbated by flu  -- aggressive pain meds, IVF  -- dilaudid 3mg PRN at this time  -- cont folic acid  -- s/p PRBC this am, Hb 6, hold off on additional transfusions for now and reassess in am  -- try to avoid further PRBC if hgb remains >6 and pt is stable or improving  -- monitor CBC, CMP, LFTs  pain is much better.  no sob or fever  can plan discharge soon    flu -- sx mgmt  -- check CXR  -- on RA    Pt to f/u with Dr Jordan after d/c. D/w pt, primary team, will follow.

## 2022-06-24 NOTE — PROGRESS NOTE ADULT - SUBJECTIVE AND OBJECTIVE BOX
HPI:  Patient is a 42M, w/ PMH of SCD and gout, presents with low back pain since yesterday. He reports 10/10, sharp, constant, non-radiating back pain. He states he has these symptoms often, and the last symptoms were a month ago. He is unable to take hydroxyurea 2/2 priapism. He denies fever, chills, n/v, chest pain, SOB, abdominal pain, urinary or bowel movement changes.  (23 Jun 2022 10:56)     Pt is seen and examined  pt is awake and lying in bed/out of bed to chair  pt seems comfortable and denies any complaints at this time    ROS:  Negative except for:    MEDICATIONS  (STANDING):  allopurinol 100 milliGRAM(s) Oral daily  colchicine 0.6 milliGRAM(s) Oral daily  folic acid 1 milliGRAM(s) Oral daily  oseltamivir 75 milliGRAM(s) Oral two times a day  oxyCODONE    IR 30 milliGRAM(s) Oral every 4 hours  sodium chloride 0.9% lock flush 3 milliLiter(s) IV Push every 8 hours  sodium chloride 0.9%. 1000 milliLiter(s) (100 mL/Hr) IV Continuous <Continuous>    MEDICATIONS  (PRN):  HYDROmorphone  Injectable 3 milliGRAM(s) IV Push every 4 hours PRN Severe Pain (7 - 10)  ondansetron Injectable 4 milliGRAM(s) IV Push every 6 hours PRN Nausea and/or Vomiting      Allergies    ceftriaxone (Anaphylaxis)  hydroxyurea (Other)  penicillin (Pruritus)  piperacillin-tazobactam (Urticaria)    Intolerances        Vital Signs Last 24 Hrs  T(C): 36.6 (24 Jun 2022 05:05), Max: 37.3 (23 Jun 2022 09:00)  T(F): 97.9 (24 Jun 2022 05:05), Max: 99.2 (23 Jun 2022 09:00)  HR: 66 (24 Jun 2022 05:05) (66 - 75)  BP: 130/79 (24 Jun 2022 05:05) (115/70 - 135/73)  BP(mean): --  RR: 18 (24 Jun 2022 05:05) (17 - 18)  SpO2: 93% (24 Jun 2022 05:05) (93% - 100%)    PHYSICAL EXAM  General: adult in NAD  HEENT: clear oropharynx, anicteric sclera, pink conjunctiva  Neck: supple  CV: normal S1/S2 with no murmur rubs or gallops  Lungs: positive air movement b/l ant lungs,clear to auscultation, no wheezes, no rales  Abdomen: soft non-tender non-distended, no hepatosplenomegaly  Ext: no clubbing cyanosis or edema  Skin: no rashes and no petechiae  Neuro: alert and oriented X 4, no focal deficits  LABS:                          5.9    7.39  )-----------( 242      ( 24 Jun 2022 08:02 )             16.4         Mean Cell Volume : 89.1 fl  Mean Cell Hemoglobin : 32.1 pg  Mean Cell Hemoglobin Concentration : 36.0 gm/dL  Auto Neutrophil # : 3.21 K/uL  Auto Lymphocyte # : 2.94 K/uL  Auto Monocyte # : 0.98 K/uL  Auto Eosinophil # : 0.15 K/uL  Auto Basophil # : 0.08 K/uL  Auto Neutrophil % : 43.4 %  Auto Lymphocyte % : 39.8 %  Auto Monocyte % : 13.3 %  Auto Eosinophil % : 2.0 %  Auto Basophil % : 1.1 %    Serial CBC  Hematocrit 16.4  Hemoglobin 5.9  Plat 242  RBC 1.84  WBC 7.39  Serial CBC  Hematocrit 16.8  Hemoglobin 6.0  Plat 247  RBC 1.86  WBC 6.95  Serial CBC  Hematocrit 13.6  Hemoglobin 5.0  Plat 262  RBC 1.47  WBC 8.32    06-24    141  |  113<H>  |  14  ----------------------------<  88  4.7   |  22  |  0.80    Ca    8.3<L>      24 Jun 2022 08:02  Phos  3.3     06-24  Mg     2.0     06-24    TPro  6.6  /  Alb  3.1<L>  /  TBili  5.9<H>  /  DBili  2.7<H>  /  AST  103<H>  /  ALT  67<H>  /  AlkPhos  150<H>  06-24      PT/INR - ( 23 Jun 2022 04:04 )   PT: 12.9 sec;   INR: 1.08 ratio         PTT - ( 23 Jun 2022 04:04 )  PTT:60.3 sec    Reticulocyte Percent: 14.9 % (06-24 @ 08:02)  Reticulocyte Percent: 21.0 % (06-23 @ 04:04)            BLOOD SMEAR INTERPRETATION:       RADIOLOGY & ADDITIONAL STUDIES:

## 2022-06-24 NOTE — DISCHARGE NOTE NURSING/CASE MANAGEMENT/SOCIAL WORK - NSDCPEFALRISK_GEN_ALL_CORE
For information on Fall & Injury Prevention, visit: [email or web address] OR  [email or web address] OR  https://www.cdc.gov/steadi/patient.html

## 2022-06-24 NOTE — PROGRESS NOTE ADULT - SUBJECTIVE AND OBJECTIVE BOX
Patient is a 42y old  Male who presents with a chief complaint of Sickle cell crisis (23 Jun 2022 11:23)    pt seen in icu [  ], reg med floor [   ], bed [  ], chair at bedside [   ], a+o x3 [  ], lethargic [  ],  nad [  ]    daniel [  ], ngt [  ], peg [  ], et tube [  ], cent line [  ], picc line [  ]        Allergies    ceftriaxone (Anaphylaxis)  hydroxyurea (Other)  penicillin (Pruritus)  piperacillin-tazobactam (Urticaria)        Vitals    T(F): 97.9 (06-24-22 @ 05:05), Max: 99.2 (06-23-22 @ 09:00)  HR: 66 (06-24-22 @ 05:05) (66 - 75)  BP: 130/79 (06-24-22 @ 05:05) (115/70 - 137/83)  RR: 18 (06-24-22 @ 05:05) (17 - 18)  SpO2: 93% (06-24-22 @ 05:05) (93% - 100%)  Wt(kg): --  CAPILLARY BLOOD GLUCOSE          Labs                          6.0    6.95  )-----------( 247      ( 24 Jun 2022 01:28 )             16.8       06-23    138  |  109<H>  |  18  ----------------------------<  101<H>  4.2   |  24  |  1.05    Ca    8.7      23 Jun 2022 04:04    TPro  7.1  /  Alb  3.2<L>  /  TBili  5.3<H>  /  DBili  x   /  AST  96<H>  /  ALT  65<H>  /  AlkPhos  147<H>  06-23            .Blood Blood  06-01 @ 18:46   No Growth Final  --  --          Radiology Results      Meds    MEDICATIONS  (STANDING):  allopurinol 100 milliGRAM(s) Oral daily  colchicine 0.6 milliGRAM(s) Oral daily  folic acid 1 milliGRAM(s) Oral daily  oseltamivir 75 milliGRAM(s) Oral two times a day  sodium chloride 0.9% lock flush 3 milliLiter(s) IV Push every 8 hours  sodium chloride 0.9%. 1000 milliLiter(s) (100 mL/Hr) IV Continuous <Continuous>      MEDICATIONS  (PRN):  HYDROmorphone  Injectable 3 milliGRAM(s) IV Push every 4 hours PRN Severe Pain (7 - 10)  ondansetron Injectable 4 milliGRAM(s) IV Push every 6 hours PRN Nausea and/or Vomiting  oxycodone    5 mG/acetaminophen 325 mG 1 Tablet(s) Oral every 4 hours PRN Moderate Pain (4 - 6)      Physical Exam    Neuro :  no focal deficits  Respiratory: CTA B/L  CV: RRR, S1S2, no murmurs,   Abdominal: Soft, NT, ND +BS,  Extremities: No edema, + peripheral pulses    ASSESSMENT    sickle cell pain crises,   symptomatic anemia,   flu a,   h/o SCD and gout    Hb-SS disease with crisis    Sickle cell anemia    Gout    Anemia requiring transfusions    Marijuana abuse    Pneumonia    History of cholecystectomy        PLAN    admit to med,   pain control,   s/p 1u pRBC, tamiflu x 5 days,   cont preadmit home meds,   gi and dvt prophylaxis  cbc,   bmp,   mg,   phos,   lipids,   tsh,   bld cx,   ua,   ucx,   retic count,   parvo virus b19    heme onc cons .     Patient is a 42y old  Male who presents with a chief complaint of Sickle cell crisis (23 Jun 2022 11:23)    pt seen in icu [  ], reg med floor [  x ], bed [ x ], chair at bedside [   ], a+o x3 [x  ], lethargic [  ],  nad [ x ]    pt states pain is resolved         Allergies    ceftriaxone (Anaphylaxis)  hydroxyurea (Other)  penicillin (Pruritus)  piperacillin-tazobactam (Urticaria)        Vitals    T(F): 97.9 (06-24-22 @ 05:05), Max: 99.2 (06-23-22 @ 09:00)  HR: 66 (06-24-22 @ 05:05) (66 - 75)  BP: 130/79 (06-24-22 @ 05:05) (115/70 - 137/83)  RR: 18 (06-24-22 @ 05:05) (17 - 18)  SpO2: 93% (06-24-22 @ 05:05) (93% - 100%)  Wt(kg): --  CAPILLARY BLOOD GLUCOSE          Labs                          6.0    6.95  )-----------( 247      ( 24 Jun 2022 01:28 )             16.8       06-23    138  |  109<H>  |  18  ----------------------------<  101<H>  4.2   |  24  |  1.05    Ca    8.7      23 Jun 2022 04:04    TPro  7.1  /  Alb  3.2<L>  /  TBili  5.3<H>  /  DBili  x   /  AST  96<H>  /  ALT  65<H>  /  AlkPhos  147<H>  06-23            .Blood Blood  06-01 @ 18:46   No Growth Final  --  --          Radiology Results    < from: Xray Chest 1 View-PORTABLE IMMEDIATE (Xray Chest 1 View-PORTABLE IMMEDIATE .) (06.23.22 @ 03:41) >  There is history of sickle cell disease.    Heart possibly enlarged.    Right MediPort again noted.    There are some linear scars atelectases at right base 1 similar 1 at left   base.    Findings are similar to CAT scan of May 31 of this year.    IMPRESSION: Unchanged findings as above.    < end of copied text >      Meds    MEDICATIONS  (STANDING):  allopurinol 100 milliGRAM(s) Oral daily  colchicine 0.6 milliGRAM(s) Oral daily  folic acid 1 milliGRAM(s) Oral daily  oseltamivir 75 milliGRAM(s) Oral two times a day  sodium chloride 0.9% lock flush 3 milliLiter(s) IV Push every 8 hours  sodium chloride 0.9%. 1000 milliLiter(s) (100 mL/Hr) IV Continuous <Continuous>      MEDICATIONS  (PRN):  HYDROmorphone  Injectable 3 milliGRAM(s) IV Push every 4 hours PRN Severe Pain (7 - 10)  ondansetron Injectable 4 milliGRAM(s) IV Push every 6 hours PRN Nausea and/or Vomiting  oxycodone    5 mG/acetaminophen 325 mG 1 Tablet(s) Oral every 4 hours PRN Moderate Pain (4 - 6)      Physical Exam    Neuro :  no focal deficits  Respiratory: CTA B/L  CV: RRR, S1S2, no murmurs,   Abdominal: Soft, NT, ND +BS,  Extremities: No edema, + peripheral pulses    ASSESSMENT    sickle cell pain crises,   symptomatic anemia,   flu a,   h/o Hb-SS disease with crisis  Sickle cell anemia  Gout  Marijuana abuse  cholecystectomy        PLAN    cont pain cont  s/p 1u pRBC,   heme f/u   acute sickle crisis, may had been exacerbated by flu  pain controlled  cont folic acid  avoid further PRBC if hgb remains >6 and pt is stable or improving  pt to f/u with dr shafer outpt  cont tamiflu x 3 more days,   pt stable for d/c     Patient is a 42y old  Male who presents with a chief complaint of Sickle cell crisis (23 Jun 2022 11:23)    pt seen in icu [  ], reg med floor [  x ], bed [ x ], chair at bedside [   ], a+o x3 [x  ], lethargic [  ],  nad [ x ]    pt states pain is resolved         Allergies    ceftriaxone (Anaphylaxis)  hydroxyurea (Other)  penicillin (Pruritus)  piperacillin-tazobactam (Urticaria)        Vitals    T(F): 97.9 (06-24-22 @ 05:05), Max: 99.2 (06-23-22 @ 09:00)  HR: 66 (06-24-22 @ 05:05) (66 - 75)  BP: 130/79 (06-24-22 @ 05:05) (115/70 - 137/83)  RR: 18 (06-24-22 @ 05:05) (17 - 18)  SpO2: 93% (06-24-22 @ 05:05) (93% - 100%)  Wt(kg): --  CAPILLARY BLOOD GLUCOSE          Labs                          6.0    6.95  )-----------( 247      ( 24 Jun 2022 01:28 )             16.8       06-23    138  |  109<H>  |  18  ----------------------------<  101<H>  4.2   |  24  |  1.05    Ca    8.7      23 Jun 2022 04:04    TPro  7.1  /  Alb  3.2<L>  /  TBili  5.3<H>  /  DBili  x   /  AST  96<H>  /  ALT  65<H>  /  AlkPhos  147<H>  06-23          Radiology Results    < from: Xray Chest 1 View-PORTABLE IMMEDIATE (Xray Chest 1 View-PORTABLE IMMEDIATE .) (06.23.22 @ 03:41) >  There is history of sickle cell disease.    Heart possibly enlarged.    Right MediPort again noted.    There are some linear scars atelectases at right base 1 similar 1 at left   base.    Findings are similar to CAT scan of May 31 of this year.    IMPRESSION: Unchanged findings as above.    < end of copied text >      Meds    MEDICATIONS  (STANDING):  allopurinol 100 milliGRAM(s) Oral daily  colchicine 0.6 milliGRAM(s) Oral daily  folic acid 1 milliGRAM(s) Oral daily  oseltamivir 75 milliGRAM(s) Oral two times a day  sodium chloride 0.9% lock flush 3 milliLiter(s) IV Push every 8 hours  sodium chloride 0.9%. 1000 milliLiter(s) (100 mL/Hr) IV Continuous <Continuous>      MEDICATIONS  (PRN):  HYDROmorphone  Injectable 3 milliGRAM(s) IV Push every 4 hours PRN Severe Pain (7 - 10)  ondansetron Injectable 4 milliGRAM(s) IV Push every 6 hours PRN Nausea and/or Vomiting  oxycodone    5 mG/acetaminophen 325 mG 1 Tablet(s) Oral every 4 hours PRN Moderate Pain (4 - 6)      Physical Exam    Neuro :  no focal deficits  Respiratory: CTA B/L  CV: RRR, S1S2, no murmurs,   Abdominal: Soft, NT, ND +BS,  Extremities: No edema, + peripheral pulses    ASSESSMENT    sickle cell pain crises,   symptomatic anemia,   flu a,   h/o Hb-SS disease with crisis  Sickle cell anemia  Gout  Marijuana abuse  cholecystectomy        PLAN    cont pain cont  s/p 1u pRBC,   cxr with some linear scars atelectases at right base 1 similar 1 at left base.Findings are similar to CAT scan of May 31 of this year noted above.  heme f/u   acute sickle crisis, may had been exacerbated by flu  pain controlled  cont folic acid  avoid further PRBC if hgb remains >6 and pt is stable or improving  pt to f/u with dr shafer outpt  cont tamiflu x 3 more days,   pt stable for d/c

## 2022-06-24 NOTE — DISCHARGE NOTE PROVIDER - NSDCCPCAREPLAN_GEN_ALL_CORE_FT
PRINCIPAL DISCHARGE DIAGNOSIS  Diagnosis: Anemia, sickle cell with crisis  Assessment and Plan of Treatment: you were given 1 unit of blood in the hospital  your repeat hemoglobin upon discharge was 5.9  follow up with Dr. Jordan  Anemia is a low number of red blood cells or a low amount of hemoglobin in your red blood cells. Hemoglobin is a protein that helps carry oxygen throughout your body. Red blood cells use iron to create hemoglobin. Anemia may develop if your body does not have enough iron. It may also develop if your body does not make enough red blood cells or they die faster than your body can make them.  How is anemia treated? Treatment depends on the type of anemia you have. You may need any of the following:  Iron or folic acid supplements help increase your red blood cell and hemoglobin levels.  Vitamin B12 injections may help boost your red blood cell count and decrease your symptoms.  A blood transfusion may be needed if your body cannot replace the blood you have lost.  Surgery may be needed to stop bleeding, or if your anemia is severe.  Eat healthy foods rich in iron and vitamin C. Nuts, meat, dark leafy green vegetables, and beans are high in iron and protein. Vitamin C helps your body absorb iron. Foods rich in vitamin C include oranges and other citrus fruits. Ask your healthcare provider for a list of other foods that are high in iron or vitamin C. Ask if you need to be on a special diet.  Call your local emergency number (911 in the [place]), or have someone call if:  You lose consciousness.  You have severe chest pain.  When should I seek immediate care?  You have dark or bloody bowel movements.        SECONDARY DISCHARGE DIAGNOSES  Diagnosis: Influenza A  Assessment and Plan of Treatment: you were found to have the FLU A, continue to take tamiflu 75 mg twice a day for 4 more days.   continue to take tylenol 650 mg every 6 hours as needed for fever temp greater than 100.4F or mild pain  wear a mask for 10 days.    Diagnosis: Gout  Assessment and Plan of Treatment: continue to take all your home meds as prescribed

## 2022-06-24 NOTE — DISCHARGE NOTE PROVIDER - CARE PROVIDER_API CALL
Chuck Jordan  INTERNAL MEDICINE  [address] [place] [address]  Phone: [phone]  Fax: [phone]  Follow Up Time: Routine

## 2022-06-24 NOTE — DISCHARGE NOTE NURSING/CASE MANAGEMENT/SOCIAL WORK - PATIENT PORTAL LINK FT
You can access the FollowMyHealth Patient Portal offered by [facility] by registering at the following website: [email or web address]. By joining [facility]’s FollowMyHealth portal, you will also be able to view your health information using other applications (apps) compatible with our system.

## 2022-08-02 NOTE — ED PROVIDER NOTE - CARE PLAN
Principal Discharge DX:	Sickle cell pain crisis   1 Dermal Autograft Text: The defect edges were debeveled with a #15 scalpel blade.  Given the location of the defect, shape of the defect and the proximity to free margins a dermal autograft was deemed most appropriate.  Using a sterile surgical marker, the primary defect shape was transferred to the donor site. The area thus outlined was incised deep to adipose tissue with a #15 scalpel blade.  The harvested graft was then trimmed of adipose and epidermal tissue until only dermis was left.  The skin graft was then placed in the primary defect and oriented appropriately.

## 2022-08-11 PROCEDURE — 70450 CT HEAD/BRAIN W/O DYE: CPT

## 2022-08-11 PROCEDURE — 85025 COMPLETE CBC W/AUTO DIFF WBC: CPT

## 2022-08-11 PROCEDURE — 85610 PROTHROMBIN TIME: CPT

## 2022-08-11 PROCEDURE — 84100 ASSAY OF PHOSPHORUS: CPT

## 2022-08-11 PROCEDURE — 83605 ASSAY OF LACTIC ACID: CPT

## 2022-08-11 PROCEDURE — 83615 LACTATE (LD) (LDH) ENZYME: CPT

## 2022-08-11 PROCEDURE — 81001 URINALYSIS AUTO W/SCOPE: CPT

## 2022-08-11 PROCEDURE — 86850 RBC ANTIBODY SCREEN: CPT

## 2022-08-11 PROCEDURE — 86900 BLOOD TYPING SEROLOGIC ABO: CPT

## 2022-08-11 PROCEDURE — 87040 BLOOD CULTURE FOR BACTERIA: CPT

## 2022-08-11 PROCEDURE — 86922 COMPATIBILITY TEST ANTIGLOB: CPT

## 2022-08-11 PROCEDURE — 87637 SARSCOV2&INF A&B&RSV AMP PRB: CPT

## 2022-08-11 PROCEDURE — 86901 BLOOD TYPING SEROLOGIC RH(D): CPT

## 2022-08-11 PROCEDURE — 99285 EMERGENCY DEPT VISIT HI MDM: CPT | Mod: 25

## 2022-08-11 PROCEDURE — 80053 COMPREHEN METABOLIC PANEL: CPT

## 2022-08-11 PROCEDURE — 82248 BILIRUBIN DIRECT: CPT

## 2022-08-11 PROCEDURE — 85045 AUTOMATED RETICULOCYTE COUNT: CPT

## 2022-08-11 PROCEDURE — 71045 X-RAY EXAM CHEST 1 VIEW: CPT

## 2022-08-11 PROCEDURE — 85730 THROMBOPLASTIN TIME PARTIAL: CPT

## 2022-08-11 PROCEDURE — 36430 TRANSFUSION BLD/BLD COMPNT: CPT

## 2022-08-11 PROCEDURE — 85027 COMPLETE CBC AUTOMATED: CPT

## 2022-08-11 PROCEDURE — 96374 THER/PROPH/DIAG INJ IV PUSH: CPT

## 2022-08-11 PROCEDURE — P9040: CPT

## 2022-08-11 PROCEDURE — 36415 COLL VENOUS BLD VENIPUNCTURE: CPT

## 2022-08-11 PROCEDURE — 83735 ASSAY OF MAGNESIUM: CPT

## 2022-08-15 ENCOUNTER — INPATIENT (INPATIENT)
Facility: HOSPITAL | Age: 42
LOS: 1 days | Discharge: HOME | DRG: 811 | End: 2022-08-17
Attending: INTERNAL MEDICINE | Admitting: INTERNAL MEDICINE
Payer: MEDICAID

## 2022-08-15 ENCOUNTER — TRANSCRIPTION ENCOUNTER (OUTPATIENT)
Age: 42
End: 2022-08-15

## 2022-08-15 VITALS
OXYGEN SATURATION: 91 % | TEMPERATURE: 98 F | RESPIRATION RATE: 18 BRPM | HEART RATE: 94 BPM | WEIGHT: 188.05 LBS | SYSTOLIC BLOOD PRESSURE: 118 MMHG | HEIGHT: 69 IN | DIASTOLIC BLOOD PRESSURE: 73 MMHG

## 2022-08-15 DIAGNOSIS — Z29.9 ENCOUNTER FOR PROPHYLACTIC MEASURES, UNSPECIFIED: ICD-10-CM

## 2022-08-15 DIAGNOSIS — D57.00 HB-SS DISEASE WITH CRISIS, UNSPECIFIED: ICD-10-CM

## 2022-08-15 DIAGNOSIS — F12.90 CANNABIS USE, UNSPECIFIED, UNCOMPLICATED: ICD-10-CM

## 2022-08-15 DIAGNOSIS — M10.9 GOUT, UNSPECIFIED: ICD-10-CM

## 2022-08-15 DIAGNOSIS — N17.9 ACUTE KIDNEY FAILURE, UNSPECIFIED: ICD-10-CM

## 2022-08-15 DIAGNOSIS — F11.20 OPIOID DEPENDENCE, UNCOMPLICATED: ICD-10-CM

## 2022-08-15 DIAGNOSIS — Z90.49 ACQUIRED ABSENCE OF OTHER SPECIFIED PARTS OF DIGESTIVE TRACT: Chronic | ICD-10-CM

## 2022-08-15 DIAGNOSIS — J18.9 PNEUMONIA, UNSPECIFIED ORGANISM: ICD-10-CM

## 2022-08-15 DIAGNOSIS — M25.50 PAIN IN UNSPECIFIED JOINT: ICD-10-CM

## 2022-08-15 LAB
ALBUMIN SERPL ELPH-MCNC: 3.4 G/DL — LOW (ref 3.5–5)
ALBUMIN SERPL ELPH-MCNC: 3.6 G/DL — SIGNIFICANT CHANGE UP (ref 3.5–5)
ALP SERPL-CCNC: 149 U/L — HIGH (ref 40–120)
ALP SERPL-CCNC: 150 U/L — HIGH (ref 40–120)
ALT FLD-CCNC: 52 U/L DA — SIGNIFICANT CHANGE UP (ref 10–60)
ALT FLD-CCNC: 52 U/L DA — SIGNIFICANT CHANGE UP (ref 10–60)
ANION GAP SERPL CALC-SCNC: 7 MMOL/L — SIGNIFICANT CHANGE UP (ref 5–17)
ANION GAP SERPL CALC-SCNC: 8 MMOL/L — SIGNIFICANT CHANGE UP (ref 5–17)
AST SERPL-CCNC: 91 U/L — HIGH (ref 10–40)
AST SERPL-CCNC: 93 U/L — HIGH (ref 10–40)
BASOPHILS # BLD AUTO: 0.06 K/UL — SIGNIFICANT CHANGE UP (ref 0–0.2)
BASOPHILS NFR BLD AUTO: 0.6 % — SIGNIFICANT CHANGE UP (ref 0–2)
BILIRUB SERPL-MCNC: 6.1 MG/DL — HIGH (ref 0.2–1.2)
BILIRUB SERPL-MCNC: 6.6 MG/DL — HIGH (ref 0.2–1.2)
BUN SERPL-MCNC: 20 MG/DL — HIGH (ref 7–18)
BUN SERPL-MCNC: 23 MG/DL — HIGH (ref 7–18)
CALCIUM SERPL-MCNC: 8.6 MG/DL — SIGNIFICANT CHANGE UP (ref 8.4–10.5)
CALCIUM SERPL-MCNC: 8.7 MG/DL — SIGNIFICANT CHANGE UP (ref 8.4–10.5)
CHLORIDE SERPL-SCNC: 110 MMOL/L — HIGH (ref 96–108)
CHLORIDE SERPL-SCNC: 113 MMOL/L — HIGH (ref 96–108)
CO2 SERPL-SCNC: 21 MMOL/L — LOW (ref 22–31)
CO2 SERPL-SCNC: 21 MMOL/L — LOW (ref 22–31)
CREAT SERPL-MCNC: 0.99 MG/DL — SIGNIFICANT CHANGE UP (ref 0.5–1.3)
CREAT SERPL-MCNC: 1.32 MG/DL — HIGH (ref 0.5–1.3)
EGFR: 69 ML/MIN/1.73M2 — SIGNIFICANT CHANGE UP
EGFR: 97 ML/MIN/1.73M2 — SIGNIFICANT CHANGE UP
EOSINOPHIL # BLD AUTO: 0.21 K/UL — SIGNIFICANT CHANGE UP (ref 0–0.5)
EOSINOPHIL NFR BLD AUTO: 2 % — SIGNIFICANT CHANGE UP (ref 0–6)
GLUCOSE SERPL-MCNC: 93 MG/DL — SIGNIFICANT CHANGE UP (ref 70–99)
GLUCOSE SERPL-MCNC: 94 MG/DL — SIGNIFICANT CHANGE UP (ref 70–99)
HCT VFR BLD CALC: 13.4 % — CRITICAL LOW (ref 39–50)
HCT VFR BLD CALC: 17.1 % — CRITICAL LOW (ref 39–50)
HGB BLD-MCNC: 4.8 G/DL — CRITICAL LOW (ref 13–17)
HGB BLD-MCNC: 6.2 G/DL — CRITICAL LOW (ref 13–17)
IMM GRANULOCYTES NFR BLD AUTO: 0.6 % — SIGNIFICANT CHANGE UP (ref 0–1.5)
LDH SERPL L TO P-CCNC: 815 U/L — HIGH (ref 120–225)
LYMPHOCYTES # BLD AUTO: 3.67 K/UL — HIGH (ref 1–3.3)
LYMPHOCYTES # BLD AUTO: 34.5 % — SIGNIFICANT CHANGE UP (ref 13–44)
MAGNESIUM SERPL-MCNC: 2.4 MG/DL — SIGNIFICANT CHANGE UP (ref 1.6–2.6)
MCHC RBC-ENTMCNC: 32.6 PG — SIGNIFICANT CHANGE UP (ref 27–34)
MCHC RBC-ENTMCNC: 33.8 PG — SIGNIFICANT CHANGE UP (ref 27–34)
MCHC RBC-ENTMCNC: 35.8 GM/DL — SIGNIFICANT CHANGE UP (ref 32–36)
MCHC RBC-ENTMCNC: 36.3 GM/DL — HIGH (ref 32–36)
MCV RBC AUTO: 90 FL — SIGNIFICANT CHANGE UP (ref 80–100)
MCV RBC AUTO: 94.4 FL — SIGNIFICANT CHANGE UP (ref 80–100)
MONOCYTES # BLD AUTO: 1.56 K/UL — HIGH (ref 0–0.9)
MONOCYTES NFR BLD AUTO: 14.7 % — HIGH (ref 2–14)
NEUTROPHILS # BLD AUTO: 5.08 K/UL — SIGNIFICANT CHANGE UP (ref 1.8–7.4)
NEUTROPHILS NFR BLD AUTO: 47.6 % — SIGNIFICANT CHANGE UP (ref 43–77)
NRBC # BLD: 2 /100 WBCS — HIGH (ref 0–0)
NRBC # BLD: 2 /100 WBCS — HIGH (ref 0–0)
PHOSPHATE SERPL-MCNC: 4.1 MG/DL — SIGNIFICANT CHANGE UP (ref 2.5–4.5)
PLATELET # BLD AUTO: 242 K/UL — SIGNIFICANT CHANGE UP (ref 150–400)
PLATELET # BLD AUTO: 243 K/UL — SIGNIFICANT CHANGE UP (ref 150–400)
POTASSIUM SERPL-MCNC: 4.4 MMOL/L — SIGNIFICANT CHANGE UP (ref 3.5–5.3)
POTASSIUM SERPL-MCNC: 4.6 MMOL/L — SIGNIFICANT CHANGE UP (ref 3.5–5.3)
POTASSIUM SERPL-SCNC: 4.4 MMOL/L — SIGNIFICANT CHANGE UP (ref 3.5–5.3)
POTASSIUM SERPL-SCNC: 4.6 MMOL/L — SIGNIFICANT CHANGE UP (ref 3.5–5.3)
PROT SERPL-MCNC: 7.4 G/DL — SIGNIFICANT CHANGE UP (ref 6–8.3)
PROT SERPL-MCNC: 7.5 G/DL — SIGNIFICANT CHANGE UP (ref 6–8.3)
RBC # BLD: 1.42 M/UL — LOW (ref 4.2–5.8)
RBC # BLD: 1.42 M/UL — LOW (ref 4.2–5.8)
RBC # BLD: 1.9 M/UL — LOW (ref 4.2–5.8)
RBC # BLD: 1.92 M/UL — LOW (ref 4.2–5.8)
RBC # FLD: 22.1 % — HIGH (ref 10.3–14.5)
RBC # FLD: 23.3 % — HIGH (ref 10.3–14.5)
RETICS #: 250.6 K/UL — HIGH (ref 25–125)
RETICS #: 304 K/UL — HIGH (ref 25–125)
RETICS/RBC NFR: 15.8 % — HIGH (ref 0.5–2.5)
RETICS/RBC NFR: 17.7 % — HIGH (ref 0.5–2.5)
SARS-COV-2 RNA SPEC QL NAA+PROBE: SIGNIFICANT CHANGE UP
SODIUM SERPL-SCNC: 139 MMOL/L — SIGNIFICANT CHANGE UP (ref 135–145)
SODIUM SERPL-SCNC: 141 MMOL/L — SIGNIFICANT CHANGE UP (ref 135–145)
WBC # BLD: 10.64 K/UL — HIGH (ref 3.8–10.5)
WBC # BLD: 9.79 K/UL — SIGNIFICANT CHANGE UP (ref 3.8–10.5)
WBC # FLD AUTO: 10.64 K/UL — HIGH (ref 3.8–10.5)
WBC # FLD AUTO: 9.79 K/UL — SIGNIFICANT CHANGE UP (ref 3.8–10.5)

## 2022-08-15 PROCEDURE — 71045 X-RAY EXAM CHEST 1 VIEW: CPT | Mod: 26

## 2022-08-15 PROCEDURE — 99222 1ST HOSP IP/OBS MODERATE 55: CPT

## 2022-08-15 PROCEDURE — 99285 EMERGENCY DEPT VISIT HI MDM: CPT

## 2022-08-15 RX ORDER — HYDROMORPHONE HYDROCHLORIDE 2 MG/ML
1 INJECTION INTRAMUSCULAR; INTRAVENOUS; SUBCUTANEOUS ONCE
Refills: 0 | Status: DISCONTINUED | OUTPATIENT
Start: 2022-08-15 | End: 2022-08-15

## 2022-08-15 RX ORDER — MAGNESIUM SULFATE 500 MG/ML
1 VIAL (ML) INJECTION ONCE
Refills: 0 | Status: COMPLETED | OUTPATIENT
Start: 2022-08-15 | End: 2022-08-15

## 2022-08-15 RX ORDER — ALLOPURINOL 300 MG
0 TABLET ORAL
Qty: 0 | Refills: 0 | DISCHARGE

## 2022-08-15 RX ORDER — LIDOCAINE 4 G/100G
1 CREAM TOPICAL DAILY
Refills: 0 | Status: DISCONTINUED | OUTPATIENT
Start: 2022-08-15 | End: 2022-08-17

## 2022-08-15 RX ORDER — ACETAMINOPHEN 500 MG
975 TABLET ORAL ONCE
Refills: 0 | Status: COMPLETED | OUTPATIENT
Start: 2022-08-15 | End: 2022-08-15

## 2022-08-15 RX ORDER — PANTOPRAZOLE SODIUM 20 MG/1
40 TABLET, DELAYED RELEASE ORAL
Refills: 0 | Status: DISCONTINUED | OUTPATIENT
Start: 2022-08-15 | End: 2022-08-15

## 2022-08-15 RX ORDER — POLYETHYLENE GLYCOL 3350 17 G/17G
17 POWDER, FOR SOLUTION ORAL DAILY
Refills: 0 | Status: DISCONTINUED | OUTPATIENT
Start: 2022-08-15 | End: 2022-08-17

## 2022-08-15 RX ORDER — ACETAMINOPHEN 500 MG
650 TABLET ORAL EVERY 6 HOURS
Refills: 0 | Status: DISCONTINUED | OUTPATIENT
Start: 2022-08-15 | End: 2022-08-15

## 2022-08-15 RX ORDER — DIPHENHYDRAMINE HCL 50 MG
50 CAPSULE ORAL ONCE
Refills: 0 | Status: COMPLETED | OUTPATIENT
Start: 2022-08-15 | End: 2022-08-15

## 2022-08-15 RX ORDER — HYDROMORPHONE HYDROCHLORIDE 2 MG/ML
2 INJECTION INTRAMUSCULAR; INTRAVENOUS; SUBCUTANEOUS ONCE
Refills: 0 | Status: DISCONTINUED | OUTPATIENT
Start: 2022-08-15 | End: 2022-08-15

## 2022-08-15 RX ORDER — OXYCODONE HYDROCHLORIDE 5 MG/1
30 TABLET ORAL EVERY 4 HOURS
Refills: 0 | Status: DISCONTINUED | OUTPATIENT
Start: 2022-08-15 | End: 2022-08-17

## 2022-08-15 RX ORDER — SODIUM CHLORIDE 9 MG/ML
1000 INJECTION, SOLUTION INTRAVENOUS
Refills: 0 | Status: DISCONTINUED | OUTPATIENT
Start: 2022-08-15 | End: 2022-08-17

## 2022-08-15 RX ORDER — SODIUM CHLORIDE 9 MG/ML
1000 INJECTION INTRAMUSCULAR; INTRAVENOUS; SUBCUTANEOUS ONCE
Refills: 0 | Status: COMPLETED | OUTPATIENT
Start: 2022-08-15 | End: 2022-08-15

## 2022-08-15 RX ORDER — HYDROMORPHONE HYDROCHLORIDE 2 MG/ML
2 INJECTION INTRAMUSCULAR; INTRAVENOUS; SUBCUTANEOUS EVERY 4 HOURS
Refills: 0 | Status: DISCONTINUED | OUTPATIENT
Start: 2022-08-15 | End: 2022-08-15

## 2022-08-15 RX ORDER — SENNA PLUS 8.6 MG/1
2 TABLET ORAL AT BEDTIME
Refills: 0 | Status: DISCONTINUED | OUTPATIENT
Start: 2022-08-15 | End: 2022-08-17

## 2022-08-15 RX ORDER — ALLOPURINOL 300 MG
100 TABLET ORAL DAILY
Refills: 0 | Status: DISCONTINUED | OUTPATIENT
Start: 2022-08-15 | End: 2022-08-17

## 2022-08-15 RX ORDER — HYDROMORPHONE HYDROCHLORIDE 2 MG/ML
0.5 INJECTION INTRAMUSCULAR; INTRAVENOUS; SUBCUTANEOUS ONCE
Refills: 0 | Status: DISCONTINUED | OUTPATIENT
Start: 2022-08-15 | End: 2022-08-15

## 2022-08-15 RX ORDER — PANTOPRAZOLE SODIUM 20 MG/1
40 TABLET, DELAYED RELEASE ORAL
Refills: 0 | Status: DISCONTINUED | OUTPATIENT
Start: 2022-08-15 | End: 2022-08-17

## 2022-08-15 RX ORDER — HYDROMORPHONE HYDROCHLORIDE 2 MG/ML
2 INJECTION INTRAMUSCULAR; INTRAVENOUS; SUBCUTANEOUS EVERY 4 HOURS
Refills: 0 | Status: DISCONTINUED | OUTPATIENT
Start: 2022-08-15 | End: 2022-08-17

## 2022-08-15 RX ORDER — FOLIC ACID 0.8 MG
1 TABLET ORAL DAILY
Refills: 0 | Status: DISCONTINUED | OUTPATIENT
Start: 2022-08-15 | End: 2022-08-17

## 2022-08-15 RX ORDER — PANTOPRAZOLE SODIUM 20 MG/1
40 TABLET, DELAYED RELEASE ORAL ONCE
Refills: 0 | Status: COMPLETED | OUTPATIENT
Start: 2022-08-15 | End: 2022-08-15

## 2022-08-15 RX ADMIN — HYDROMORPHONE HYDROCHLORIDE 2 MILLIGRAM(S): 2 INJECTION INTRAMUSCULAR; INTRAVENOUS; SUBCUTANEOUS at 19:58

## 2022-08-15 RX ADMIN — HYDROMORPHONE HYDROCHLORIDE 1 MILLIGRAM(S): 2 INJECTION INTRAMUSCULAR; INTRAVENOUS; SUBCUTANEOUS at 04:15

## 2022-08-15 RX ADMIN — HYDROMORPHONE HYDROCHLORIDE 0.5 MILLIGRAM(S): 2 INJECTION INTRAMUSCULAR; INTRAVENOUS; SUBCUTANEOUS at 22:37

## 2022-08-15 RX ADMIN — Medication 975 MILLIGRAM(S): at 03:48

## 2022-08-15 RX ADMIN — HYDROMORPHONE HYDROCHLORIDE 2 MILLIGRAM(S): 2 INJECTION INTRAMUSCULAR; INTRAVENOUS; SUBCUTANEOUS at 02:31

## 2022-08-15 RX ADMIN — Medication 1 MILLIGRAM(S): at 11:46

## 2022-08-15 RX ADMIN — LIDOCAINE 1 PATCH: 4 CREAM TOPICAL at 22:30

## 2022-08-15 RX ADMIN — SODIUM CHLORIDE 1000 MILLILITER(S): 9 INJECTION INTRAMUSCULAR; INTRAVENOUS; SUBCUTANEOUS at 01:54

## 2022-08-15 RX ADMIN — Medication 50 MILLIGRAM(S): at 03:48

## 2022-08-15 RX ADMIN — HYDROMORPHONE HYDROCHLORIDE 2 MILLIGRAM(S): 2 INJECTION INTRAMUSCULAR; INTRAVENOUS; SUBCUTANEOUS at 06:38

## 2022-08-15 RX ADMIN — HYDROMORPHONE HYDROCHLORIDE 2 MILLIGRAM(S): 2 INJECTION INTRAMUSCULAR; INTRAVENOUS; SUBCUTANEOUS at 20:02

## 2022-08-15 RX ADMIN — HYDROMORPHONE HYDROCHLORIDE 2 MILLIGRAM(S): 2 INJECTION INTRAMUSCULAR; INTRAVENOUS; SUBCUTANEOUS at 16:05

## 2022-08-15 RX ADMIN — Medication 100 GRAM(S): at 06:38

## 2022-08-15 RX ADMIN — HYDROMORPHONE HYDROCHLORIDE 1 MILLIGRAM(S): 2 INJECTION INTRAMUSCULAR; INTRAVENOUS; SUBCUTANEOUS at 03:45

## 2022-08-15 RX ADMIN — SODIUM CHLORIDE 75 MILLILITER(S): 9 INJECTION, SOLUTION INTRAVENOUS at 22:24

## 2022-08-15 RX ADMIN — Medication 975 MILLIGRAM(S): at 04:18

## 2022-08-15 RX ADMIN — Medication 100 MILLIGRAM(S): at 11:46

## 2022-08-15 RX ADMIN — HYDROMORPHONE HYDROCHLORIDE 2 MILLIGRAM(S): 2 INJECTION INTRAMUSCULAR; INTRAVENOUS; SUBCUTANEOUS at 21:17

## 2022-08-15 RX ADMIN — HYDROMORPHONE HYDROCHLORIDE 2 MILLIGRAM(S): 2 INJECTION INTRAMUSCULAR; INTRAVENOUS; SUBCUTANEOUS at 19:59

## 2022-08-15 RX ADMIN — HYDROMORPHONE HYDROCHLORIDE 2 MILLIGRAM(S): 2 INJECTION INTRAMUSCULAR; INTRAVENOUS; SUBCUTANEOUS at 11:42

## 2022-08-15 RX ADMIN — HYDROMORPHONE HYDROCHLORIDE 2 MILLIGRAM(S): 2 INJECTION INTRAMUSCULAR; INTRAVENOUS; SUBCUTANEOUS at 02:01

## 2022-08-15 RX ADMIN — HYDROMORPHONE HYDROCHLORIDE 2 MILLIGRAM(S): 2 INJECTION INTRAMUSCULAR; INTRAVENOUS; SUBCUTANEOUS at 07:00

## 2022-08-15 RX ADMIN — HYDROMORPHONE HYDROCHLORIDE 0.5 MILLIGRAM(S): 2 INJECTION INTRAMUSCULAR; INTRAVENOUS; SUBCUTANEOUS at 22:29

## 2022-08-15 NOTE — CONSULT NOTE ADULT - ASSESSMENT
Confidential Drug Utilization Report  Search Terms: macey gonzales, 1980Search Date: 08/15/2022 09:34:21 AM  The Drug Utilization Report below displays all of the controlled substance prescriptions, if any, that your patient has filled in the last twelve months. The information displayed on this report is compiled from pharmacy submissions to the Department, and accurately reflects the information as submitted by the pharmacies.    This report was requested by: Lina Cintron | Reference #: [id number]    You have not added a RAFAELA number. Keeping your RAFAELA number(s) up to date on the My RAFAELA # page will enable the separation of your prescriptions from others in the search results.    Others' Prescriptions  Patient Name: Macey GonzalesBirth Date: 1980  Address: [address] [place] [id number]: Male  Rx Written	Rx Dispensed	Drug	Quantity	Days Supply	Prescriber Name	Prescriber Rafaela #	Payment Method	Dispenser  08/11/2022	08/12/2022	oxycodone hcl (ir) 30 mg tab	180	30	Jordan, Shirley SOLITARIO	[id number]	Insurance	[facility]   07/14/2022	07/15/2022	oxycodone hcl (ir) 30 mg tab	180	30	Jordan, Shirley SOLITARIO	[id number]	Insurance	[facility]   06/16/2022	06/16/2022	oxycodone hcl (ir) 30 mg tab	180	30	Jordan, Shirley SOLITARIO	[id number]	Insurance	[facility]   05/19/2022	05/20/2022	oxycodone hcl (ir) 30 mg tab	180	30	Jordan, Shirley SOLITARIO	[id number]	Insurance	[facility]   04/21/2022	04/22/2022	oxycodone hcl (ir) 30 mg tab	180	30	Jordan, Shirley SOLITARIO	[id number]	Insurance	[facility]   03/24/2022	03/25/2022	oxycodone hcl (ir) 30 mg tab	180	30	Jordan, Shirley SOLITARIO	[id number]	Insurance	[facility]   02/24/2022	02/24/2022	oxycodone hcl (ir) 30 mg tab	180	30	Jordan, Shirley SOLITARIO	[id number]	Insurance	[facility]   12/30/2021	12/30/2021	oxycodone hcl (ir) 30 mg tab	180	30	Darrion Barron	[id number]	Insurance	[facility]   12/02/2021	12/02/2021	oxycodone hcl (ir) 30 mg tab	180	30	Jordan, Shirley SOLITARIO	[id number]	Insurance	[facility]   11/04/2021	11/05/2021	oxycodone hcl 30 mg tablet	180	30	Jordan, Shirley SOLITARIO	[id number]	Insurance	[facility]   10/07/2021	10/07/2021	oxycodone hcl 30 mg tablet	180	30	Jordan, Shirley SOLITARIO	[id number]	Insurance	[facility]   * - Drugs marked with an asterisk are compound drugs. If the compound drug is made up of more than one controlled substance, then each controlled substance will be a separate row in the table.

## 2022-08-15 NOTE — H&P ADULT - ATTENDING COMMENTS
41 yo M w/ PMHx sickle cell disease, gout and cholecystectomy who presents with knee and back pain x 2 days. Pt states he comes in every month for blood transfusions 2/2 to sickle cell disease. He states his pain is a 10/10 sharp pain that is in most of the joints in his body, specifically jaw, knee, mouth, and back. Patient states he is also dizzy, has a right sided headache, is nauseous, and has worsening lower extremity swelling. Otherwise, denies CP, SOB, Fever, chills, constipation, diarrhea, numbness/tingling, bleeding, visual disturbances, or fevers. Of note, he is up to date on his immunizations.     assessment  --  sickle brenda pain crisis, anemia 2nd sickle cell disease, aretha, atelectasis, h/o sickle cell disease, gout and cholecystectomy    plan  --  admit to med,  ivf, s/p 1 unit prbc, cont pain control, incentive spirometer, cont preadmit home meds, gi and dvt prophylaxis  cbc, bmp, mg, phos, lipids, tsh, bld cx, ua, ucx,       pain mgmt cons  heme onc cons    pulm cons

## 2022-08-15 NOTE — CONSULT NOTE ADULT - PROBLEM SELECTOR RECOMMENDATION 9
pain control  oxygen supp  IVF  folic acid  monitor retic count  hem/Onc eval
Pt with acute generalized joint pain which is somatic in nature due to SCC. Retic % 17.7.+ anemia. s/p 1 unit PRBC + MATA hx gout. Pt is opioid dependent.   IVF per primary team.   Opioid pain recommendations   - Continue oxycodone 30 mg PO q 4 hours PRN moderate pain. (home dose) Monitor for sedation/ respiratory depression.   - Continue dilaudid 2mg IV q4h PRN severe pain. Monitor for sedation/ respiratory depression.   Non-opioid pain recommendations   - Avoid NSAIDs- anemia, MATA  - Avoid Acetaminophen, transaminitis   - Continue allopurinol 100mg PO daily  - Colchicine held per primary team d/t MATA  Bowel Regimen  - Miralax 17G PO daily  - Continue Senna 2 tablets at bedtime for constipation  Mild pain   - Non-pharmacological pain treatment recommendations  - Warm/ Cool packs PRN   - Repositioning, imagery, relaxation, distraction.  - Physical therapy OOB if no contraindications   Recommendations discussed with primary team and RN

## 2022-08-15 NOTE — ED ADULT NURSE NOTE - CAS EDP DISCH DISPOSITION ADMI
[facility]

## 2022-08-15 NOTE — H&P ADULT - ASSESSMENT
43 yo M w/ PMHx sickle cell disease, gout and cholecystectomy who presents with knee and back pain 2/2 sickle cell crisis. 41 yo M w/ PMHx sickle cell disease, gout and cholecystectomy who presents with knee and back pain 2/2 sickle cell crisis. Admitted for sickle cell crisis management.

## 2022-08-15 NOTE — CONSULT NOTE ADULT - ASSESSMENT
TOLU VARGAS is a 42y Male who presents with a chief complaint of sickle cell.    Sickle Cell Anemia  - Patient with baseline hemoglobin in the 5-6 range. Baseline bilirubin is in the 6 range from chronic ongoing sickle cell hemolysis.  - Monitor CBC. Transfusion only if symptomatic.   - IVF and IV opiate pain control as needed.  - Continue folic acid.   - Pain management consultation.    Acute Kidney Injury  - Patient with creatinine at 1.32. Baseline is around 0.9 to 1.0.  - Continue IVF and monitor kidney functions.    Will continue to follow.    Bala Pan MD  Hematology/Oncology  C: [phone]  O: [phone] TOLU VARGAS is a 42y Male who presents with a chief complaint of sickle cell.    Sickle Cell Anemia  - Patient with baseline hemoglobin in the 5-6 range. Baseline bilirubin is in the 6 range from chronic ongoing sickle cell hemolysis.  - Monitor CBC. Transfusion only if symptomatic.   - IVF and IV opiate pain control as needed.  - Continue folic acid.   - Pain management consultation.    Acute Kidney Injury  - Patient with creatinine at 1.32. Baseline is around 0.9 to 1.0.  - Continue IVF and monitor kidney functions.    Will continue to follow.    Bala Pan MD  Hematology/Oncology  O: [phone]

## 2022-08-15 NOTE — PATIENT PROFILE ADULT - DO YOU NEED ADDITIONAL SERVICES TO MANAGE ANY OF THESE MEDICAL CONDITIONS AT HOME?
Skin care Plan:  1-Cleanse sacro-buttocks with soap and water  Apply Allevyn foam  Yobani with T for treatment  Change every three days and PRN  check skin q-shift  2-Turn/reposition q2h or when medically stable for pressure re-distribution on skin   3-Elevate heels to offload pressure  4-Moisturize skin daily with skin nourishing cream  5-Ehob cushion in chair when out of bed  6-Hydraguard to bilateral heels BID and PRN  7- Cleanse entire right leg with NSS, pat dry  -Right lower leg and distal thigh- To open wound: Apply adaptic, then maxord, cover with  ABD, wrap with kristin  Change daily or PRN for soilage or dislodgement    - Right medial upper thigh- To open wound: Apply adaptic, then maxorb, cover with large  sacral Alleyvn foam  Change every other day or PRN for soilage or dislodgement  8  Cleanse right foot with soap and water, pat dry  Apply 3M no sting to lateral foot DTPI and toe wound daily and PRN  9  Cleanse scrotum and jeff-area with soap and water, pat dry  Apply hydraguard to scrotum BID and PRN 
no

## 2022-08-15 NOTE — ED ADULT NURSE NOTE - ED STAT RN HANDOFF DETAILS 2
Report given to YAHAIRA Beck. Pt resting in bed, no acute distress noted, denies chest pain, no shortness of breath indicated.

## 2022-08-15 NOTE — ED PROVIDER NOTE - CLINICAL SUMMARY MEDICAL DECISION MAKING FREE TEXT BOX
41 y/o male patient with sickle cell pain crisis, worsening anemia, Dr. Stefan SOSA endorsed and will admit. 43 y/o male patient with sickle cell pain crisis, worsening anemia, Dr. Dumont and  MAR endorsed. Pt agrees with admission fo analgesia, transfusion and serial H/H.  I had a detailed discussion with the patient and/or guardian regarding the historical points, exam findings, and any diagnostic results supporting the admit diagnosis.

## 2022-08-15 NOTE — H&P ADULT - HISTORY OF PRESENT ILLNESS
43 yo M w/ PMHx sickle cell disease, gout and cholecystectomy who presents with knee and back pain 2/2 sickle cell crisis. Pt states he comes in every month for blood transfusions. He states his pain is a 10/10 sharp pain that is in most of the joints in his body, specifically jaw, knee, mouth, and back. Patient states he is also dizzy, has a right sided headache and is nauseous. Denies CP, SOB, Fever, chills, constipation, diarrhea, or numbness/tingling.  43 yo M w/ PMHx sickle cell disease, gout and cholecystectomy who presents with knee and back pain 2/2 sickle cell crisis. Pt states he comes in every month for blood transfusions due to crisises. He states his pain is a 10/10 sharp pain that is in most of the joints in his body, specifically jaw, knee, mouth, and back. Patient states he is also dizzy, has a right sided headache and is nauseous. Denies CP, SOB, Fever, chills, constipation, diarrhea, or numbness/tingling.  41 yo M w/ PMHx sickle cell disease, gout and cholecystectomy who presents with knee and back pain x 2 days. Pt states he comes in every month for blood transfusions 2/2 to sickle cell disease. He states his pain is a 10/10 sharp pain that is in most of the joints in his body, specifically jaw, knee, mouth, and back. Patient states he is also dizzy, has a right sided headache, is nauseous, and has worsening lower extremity swelling. Otherwise, denies CP, SOB, Fever, chills, constipation, diarrhea, numbness/tingling, bleeding, visual disturbances, or fevers. Of note, he is up to date on his immunizations.

## 2022-08-15 NOTE — ED ADULT NURSE NOTE - OBJECTIVE STATEMENT
Patient presented to the ED with c/o generalized body aches. Pt noted to be jaundiced, yellow sclera and pitting edema to B/L extremities. Right chest chemo port. Patient presented to the ED with c/o generalized body aches. Pt noted to be jaundiced, yellow sclera and pitting edema to B/L extremities. RCW [place].

## 2022-08-15 NOTE — H&P ADULT - NSHPPHYSICALEXAM_GEN_ALL_CORE
GENERAL: Mild Distress  HEAD:  Atraumatic, Normocephalic  EYES: EOMI, PERRLA, conjunctiva and sclera clear  NECK: Supple, No JVD  CHEST/LUNG: Clear to auscultation bilaterally; No wheeze  HEART: Regular rate and rhythm; No murmurs, rubs, or gallops  ABDOMEN: Soft, Nontender, Nondistended; Bowel sounds present  EXTREMITIES:  2+ Peripheral Pulses, No clubbing, cyanosis. 2+ pitting edema B/L LE  PSYCH: AAOx3  NEUROLOGY: non-focal  SKIN: No rashes or lesions

## 2022-08-15 NOTE — CONSULT NOTE ADULT - SUBJECTIVE AND OBJECTIVE BOX
CHIEF COMPLAINT  Sickle Cell    HISTORY OF PRESENT ILLNESS  TOLU VARGAS is a 42y Male who presents with a chief complaint of sickle cell.    Patient was admitted on August 15th after presenting with sharp joint pain in the lower extremities and back; also endorsed headache, nausea, and dizziness. Patient follows with Dr. Jordan for sickle cell disease. He was admitted for pain crisis.     PAST MEDICAL AND SURGICAL HISTORY  Gout  Sickle Cell Anemia    FAMILY HISTORY  Non-contributory    SOCIAL HISTORY  Denies tobacco use    REVIEW OF SYSTEMS  A complete review of systems was performed; negative except per HPI    PHYSICAL EXAM  T(C): 36.1 (08-15-22 @ 07:43), Max: 36.9 (08-15-22 @ 00:56)  HR: 84 (08-15-22 @ 07:43) (75 - 98)  BP: 116/73 (08-15-22 @ 07:43) (116/73 - 134/70)  RR: 18 (08-15-22 @ 07:43) (18 - 20)  SpO2: 97% (08-15-22 @ 07:43) (91% - 98%)  Constitutional: alert, awake, in no acute distress  Eyes: PERRL, EOMI  HEENT: normocephalic, atraumatic  Neck: supple, non-tender  Cardiovascular: normal perfusion, no peripheral edema  Respiratory: normal respiratory efforts; no increased use of accessory muscles  Gastrointestinal: soft, non-tender  Musculoskeletal: normal range of motion, no deformities noted  Neurological: alert, CN II to XI grossly intact  Skin: warm, dry    LABORATORY DATA                        4.8    10.64 )-----------( 243      ( 15 Aug 2022 02:10 )             13.4     08-15    139  |  110<H>  |  23<H>  ----------------------------<  94  4.6   |  21<L>  |  1.32<H>    Ca    8.6      15 Aug 2022 02:10    TPro  7.4  /  Alb  3.6  /  TBili  6.6<H>  /  DBili  x   /  AST  91<H>  /  ALT  52  /  AlkPhos  149<H>  08-15     CHIEF COMPLAINT  Sickle Cell    HISTORY OF PRESENT ILLNESS  TOLU AVRGAS is a 42y Male who presents with a chief complaint of sickle cell.    Patient was admitted on August 15th after presenting with sharp joint pain in the lower extremities and back; also endorsed headache, nausea, and dizziness. Patient follows with Dr. Jordan for sickle cell disease. He was admitted for pain crisis. Denies fevers, chills, chest, dyspnea.    PAST MEDICAL AND SURGICAL HISTORY  Gout  Sickle Cell Anemia    FAMILY HISTORY  Non-contributory    SOCIAL HISTORY  Denies tobacco use    REVIEW OF SYSTEMS  A complete review of systems was performed; negative except per HPI    PHYSICAL EXAM  T(C): 36.1 (08-15-22 @ 07:43), Max: 36.9 (08-15-22 @ 00:56)  HR: 84 (08-15-22 @ 07:43) (75 - 98)  BP: 116/73 (08-15-22 @ 07:43) (116/73 - 134/70)  RR: 18 (08-15-22 @ 07:43) (18 - 20)  SpO2: 97% (08-15-22 @ 07:43) (91% - 98%)  Constitutional: alert, awake, in no acute distress  Eyes: PERRL, EOMI  HEENT: normocephalic, atraumatic  Neck: supple, non-tender  Cardiovascular: normal perfusion, no peripheral edema  Respiratory: normal respiratory efforts; no increased use of accessory muscles  Gastrointestinal: soft, non-tender  Musculoskeletal: normal range of motion, no deformities noted  Neurological: alert, CN II to XI grossly intact  Skin: warm, dry    LABORATORY DATA                        4.8    10.64 )-----------( 243      ( 15 Aug 2022 02:10 )             13.4     08-15    139  |  110<H>  |  23<H>  ----------------------------<  94  4.6   |  21<L>  |  1.32<H>    Ca    8.6      15 Aug 2022 02:10    TPro  7.4  /  Alb  3.6  /  TBili  6.6<H>  /  DBili  x   /  AST  91<H>  /  ALT  52  /  AlkPhos  149<H>  08-15

## 2022-08-15 NOTE — PATIENT PROFILE ADULT - FALL HARM RISK - UNIVERSAL INTERVENTIONS
Bed in lowest position, wheels locked, appropriate side rails in place/Call bell, personal items and telephone in reach/Instruct patient to call for assistance before getting out of bed or chair/Non-slip footwear when patient is out of bed/[place] to call system/Physically safe environment - no spills, clutter or unnecessary equipment/Purposeful Proactive Rounding/Room/bathroom lighting operational, light cord in reach

## 2022-08-15 NOTE — CONSULT NOTE ADULT - SUBJECTIVE AND OBJECTIVE BOX
Source of information: TOLU VARGAS, Chart review  Patient language: English  : n/a    HPI:  43 yo M w/ PMHx sickle cell disease, gout and cholecystectomy who presents with knee and back pain x 2 days. Pt states he comes in every month for blood transfusions 2/2 to sickle cell disease. He states his pain is a 10/10 sharp pain that is in most of the joints in his body, specifically jaw, knee, mouth, and back. Patient states he is also dizzy, has a right sided headache, is nauseous, and has worsening lower extremity swelling. Otherwise, denies CP, SOB, Fever, chills, constipation, diarrhea, numbness/tingling, bleeding, visual disturbances, or fevers. Of note, he is up to date on his immunizations.  (15 Aug 2022 04:24)      Dx with sickle cell crisis. Pain consulted for joint pain. Seen by pain management in 6/2022 pt was on oxycodone 30mg PO q6h PRN and dialudid 3mg IV qh PRN. Pt with anemia, h/h 4.8/13.4. Received 1 unit PRBC. Retic % 17.7. Pt seen and examined at bedside. Pt sitting on side of stretcher, reports generalized joint pain, greatest over lumbar back, rating score 10/10  SCALE USED: (1-10 VNRS). Pt is due for PRN pain meds.  Pt describes pain as constant, sharp, non- radiating, alleviated by pain medication, exacerbated by movement and palpation. Pt tolerating PO diet. Denies lethargy, chest pain, SOB, nausea, vomiting, constipation. Reports last BM 8/14. Patient stated goal for pain control: to be able to take deep breaths, get out of bed to chair and ambulate with tolerable pain control. Pt is opioid dependent, on oxycodone 30mg PO q4h PRN.    PAST MEDICAL & SURGICAL HISTORY:  Sickle cell anemia      Gout      History of cholecystectomy          FAMILY HISTORY:  Family history of sickle cell trait (Father, Mother)        Social History:  Lives at home (15 Aug 2022 04:24)   [X ] Denies ETOH use, illicit drug use   [X] daily marijuana smoking    Allergies    ceftriaxone (Anaphylaxis)  hydroxyurea (Other)  penicillin (Pruritus)  piperacillin-tazobactam (Urticaria)    MEDICATIONS  (STANDING):  allopurinol 100 milliGRAM(s) Oral daily  folic acid 1 milliGRAM(s) Oral daily  multivitamin 1 Tablet(s) Oral daily  pantoprazole    Tablet 40 milliGRAM(s) Oral before breakfast  senna 2 Tablet(s) Oral at bedtime  sodium chloride 0.45%. 1000 milliLiter(s) (75 mL/Hr) IV Continuous <Continuous>    MEDICATIONS  (PRN):  acetaminophen     Tablet .. 650 milliGRAM(s) Oral every 6 hours PRN Mild Pain (1 - 3)  HYDROmorphone  Injectable 2 milliGRAM(s) IV Push every 4 hours PRN Severe Pain (7 - 10)  oxyCODONE    IR 30 milliGRAM(s) Oral every 4 hours PRN Moderate Pain (4 - 6)      Vital Signs Last 24 Hrs  T(C): 36.1 (15 Aug 2022 07:43), Max: 36.9 (15 Aug 2022 00:56)  T(F): 97 (15 Aug 2022 07:43), Max: 98.5 (15 Aug 2022 00:56)  HR: 84 (15 Aug 2022 07:43) (75 - 98)  BP: 116/73 (15 Aug 2022 07:43) (116/73 - 134/70)  BP(mean): --  RR: 18 (15 Aug 2022 07:43) (18 - 20)  SpO2: 97% (15 Aug 2022 07:43) (91% - 98%)    Parameters below as of 15 Aug 2022 07:43  Patient On (Oxygen Delivery Method): room air        LABS: Reviewed.                          4.8    10.64 )-----------( 243      ( 15 Aug 2022 02:10 )             13.4     08-15    139  |  110<H>  |  23<H>  ----------------------------<  94  4.6   |  21<L>  |  1.32<H>    Ca    8.6      15 Aug 2022 02:10    TPro  7.4  /  Alb  3.6  /  TBili  6.6<H>  /  DBili  x   /  AST  91<H>  /  ALT  52  /  AlkPhos  149<H>  08-15      LIVER FUNCTIONS - ( 15 Aug 2022 02:10 )  Alb: 3.6 g/dL / Pro: 7.4 g/dL / ALK PHOS: 149 U/L / ALT: 52 U/L DA / AST: 91 U/L / GGT: x             CAPILLARY BLOOD GLUCOSE        COVID-19 PCR: NotDetec (15 Aug 2022 02:57)  COVID-19 PCR: NotDetec (31 May 2022 02:47)  COVID-19 PCR: NotDetec (17 Mar 2022 06:25)  COVID-19 PCR: NotDetec (10 Mar 2022 19:07)  COVID-19 PCR: NotDetec (07 Mar 2022 07:08)  COVID-19 PCR: NotDetec (28 Feb 2022 11:17)  COVID-19 PCR: NotDetec (25 Feb 2022 00:43)      Radiology: Reviewed.   < from: Xray Chest 1 View-PORTABLE IMMEDIATE (Xray Chest 1 View-PORTABLE IMMEDIATE .) (08.15.22 @ 04:06) >    ACC: [id number] EXAM:  XR CHEST PORTABLE IMMED 1V                          PROCEDURE DATE:  08/15/2022          INTERPRETATION:  PROCEDURE: AP view of the chest.    CLINICAL INFORMATION: Sickle cell crisis.    COMPARISON: 6/23/2022.    FINDINGS:    Right chest medication port is noted.    Lungs: There are bilateral lung opacities.  Heart: There is cardiomegaly.  Mediastinum: The mediastinum is within normal limits.    IMPRESSION:    Bilateral lung opacities, suspicious for pneumonia.    --- End of Report ---            MEKHI ADHIKARI MD; Attending Radiologist  This document has been electronically signed. Aug 15 2022  7:25AM    < end of copied text >      ORT Score -   Family Hx of substance abuse	Female	      Male  Alcohol 	                                           1                     3  Illegal drugs	                                   2                     3  Rx drugs                                           4 	                  4  Personal Hx of substance abuse		  Alcohol 	                                          3	                  3  Illegal drugs                                     4	                  4  Rx drugs                                            5 	                  5  Age between 16- 45 years	           1                     1  hx preadolescent sexual abuse	   3 	                  0  Psychological disease		  ADD, OCD, bipolar, schizophrenia   2	          2  Depression                                           1 	          1  Total: 1    a score of 3 or lower indicates low risk for opioid abuse		  a score of 4-7 indicates moderate risk for opioid abuse		  a score of 8 or higher indicates high risk for opioid abuse    REVIEW OF SYSTEMS:  CONSTITUTIONAL: No fever or fatigue  HEENT:  No difficulty hearing, no change in vision  NECK: No pain or stiffness  RESPIRATORY: No cough, wheezing, chills or hemoptysis; No shortness of breath  CARDIOVASCULAR: No chest pain, palpitations, dizziness   GASTROINTESTINAL: No loss of appetite, decreased PO intake. No abdominal or epigastric pain. No nausea, vomiting; No diarrhea or constipation.   GENITOURINARY: No dysuria, frequency, hematuria, retention or incontinence  MUSCULOSKELETAL: + generalized joint pain, greatest over lower back + chronic b/l LE swelling; no upper or lower motor strength weakness, no saddle anesthesia, bowel/bladder incontinence, no falls   NEURO: No headaches, no numbness/tingling b/l LE, No weakness    PHYSICAL EXAM:  GENERAL:  Alert & Oriented X4, cooperative, NAD, Good concentration. Speech is clear.   RESPIRATORY: Respirations even and unlabored. Clear to auscultation bilaterally; No rales, rhonchi, wheezing, or rubs  CARDIOVASCULAR: Normal S1/S2, regular rate and rhythm; No murmurs, rubs, or gallops. No JVD. + right CW mediport  GASTROINTESTINAL:  Soft, Nontender, Nondistended; Bowel sounds present  PERIPHERAL VASCULAR:  Extremities warm with moderate b/l LE edema. 2+ Peripheral Pulses, No cyanosis, No calf tenderness  MUSCULOSKELETAL: Motor Strength 5/5 B/L upper and lower extremities; moves all extremities equally against gravity; ROM intact; + generalized joint tenderness.   SKIN: +icterus; Warm, dry, intact. No rashes, lesions, scars or wounds.     Risk factors associated with adverse outcomes related to opioid treatment  [ ]  Concurrent benzodiazepine use  [ ]  History/ Active substance use or alcohol use disorder  [ ] Psychiatric co-morbidity  [ ] Sleep apnea  [ ] COPD  [ ] BMI> 35  [ X] Liver dysfunction  [ X] Renal dysfunction  [ ] CHF  [ x] Smoker- marijuana   [ ]  Age > 60 years    [X ]  [place]  Reviewed and Copied to Chart. See below.    Plan of care and goal oriented pain management treatment options were discussed with patient and /or primary care giver; all questions and concerns were addressed and care was aligned with patient's wishes.    Educated patient on goal oriented pain management treatment options        Source of information: TOLU VARGAS, Chart review  Patient language: English  : n/a    HPI:  43 yo M w/ PMHx sickle cell disease, gout and cholecystectomy who presents with knee and back pain x 2 days. Pt states he comes in every month for blood transfusions 2/2 to sickle cell disease. He states his pain is a 10/10 sharp pain that is in most of the joints in his body, specifically jaw, knee, mouth, and back. Patient states he is also dizzy, has a right sided headache, is nauseous, and has worsening lower extremity swelling. Otherwise, denies CP, SOB, Fever, chills, constipation, diarrhea, numbness/tingling, bleeding, visual disturbances, or fevers. Of note, he is up to date on his immunizations.  (15 Aug 2022 04:24)      Dx with sickle cell crisis. Pain consulted for joint pain. Seen by pain management in 6/2022 pt was on oxycodone 30mg PO q6h PRN and dialudid 3mg IV qh PRN. Pt with anemia, h/h 4.8/13.4. Received 1 unit PRBC. Retic % 17.7. Pt seen and examined at bedside. Pt sitting on side of stretcher, reports generalized joint pain, greatest over lumbar back, rating score 10/10  SCALE USED: (1-10 VNRS). Pt is due for PRN pain meds.  Pt describes pain as constant, sharp, non- radiating, alleviated by pain medication, exacerbated by movement and palpation. Reports experiencing monthly crises, denies known triggers. Pt tolerating PO diet. Denies lethargy, chest pain, SOB, nausea, vomiting, constipation. Reports last BM 8/14. Patient stated goal for pain control: to be able to take deep breaths, get out of bed to chair and ambulate with tolerable pain control. Pt is opioid dependent, on oxycodone 30mg PO q4h PRN.    PAST MEDICAL & SURGICAL HISTORY:  Sickle cell anemia      Gout      History of cholecystectomy          FAMILY HISTORY:  Family history of sickle cell trait (Father, Mother)        Social History:  Lives at home (15 Aug 2022 04:24)   [X ] Denies ETOH use, illicit drug use   [X] daily marijuana smoking    Allergies    ceftriaxone (Anaphylaxis)  hydroxyurea (Other)  penicillin (Pruritus)  piperacillin-tazobactam (Urticaria)    MEDICATIONS  (STANDING):  allopurinol 100 milliGRAM(s) Oral daily  folic acid 1 milliGRAM(s) Oral daily  multivitamin 1 Tablet(s) Oral daily  pantoprazole    Tablet 40 milliGRAM(s) Oral before breakfast  senna 2 Tablet(s) Oral at bedtime  sodium chloride 0.45%. 1000 milliLiter(s) (75 mL/Hr) IV Continuous <Continuous>    MEDICATIONS  (PRN):  acetaminophen     Tablet .. 650 milliGRAM(s) Oral every 6 hours PRN Mild Pain (1 - 3)  HYDROmorphone  Injectable 2 milliGRAM(s) IV Push every 4 hours PRN Severe Pain (7 - 10)  oxyCODONE    IR 30 milliGRAM(s) Oral every 4 hours PRN Moderate Pain (4 - 6)      Vital Signs Last 24 Hrs  T(C): 36.1 (15 Aug 2022 07:43), Max: 36.9 (15 Aug 2022 00:56)  T(F): 97 (15 Aug 2022 07:43), Max: 98.5 (15 Aug 2022 00:56)  HR: 84 (15 Aug 2022 07:43) (75 - 98)  BP: 116/73 (15 Aug 2022 07:43) (116/73 - 134/70)  BP(mean): --  RR: 18 (15 Aug 2022 07:43) (18 - 20)  SpO2: 97% (15 Aug 2022 07:43) (91% - 98%)    Parameters below as of 15 Aug 2022 07:43  Patient On (Oxygen Delivery Method): room air        LABS: Reviewed.                          4.8    10.64 )-----------( 243      ( 15 Aug 2022 02:10 )             13.4     08-15    139  |  110<H>  |  23<H>  ----------------------------<  94  4.6   |  21<L>  |  1.32<H>    Ca    8.6      15 Aug 2022 02:10    TPro  7.4  /  Alb  3.6  /  TBili  6.6<H>  /  DBili  x   /  AST  91<H>  /  ALT  52  /  AlkPhos  149<H>  08-15      LIVER FUNCTIONS - ( 15 Aug 2022 02:10 )  Alb: 3.6 g/dL / Pro: 7.4 g/dL / ALK PHOS: 149 U/L / ALT: 52 U/L DA / AST: 91 U/L / GGT: x             CAPILLARY BLOOD GLUCOSE        COVID-19 PCR: NotDetec (15 Aug 2022 02:57)  COVID-19 PCR: NotDetec (31 May 2022 02:47)  COVID-19 PCR: NotDetec (17 Mar 2022 06:25)  COVID-19 PCR: NotDetec (10 Mar 2022 19:07)  COVID-19 PCR: NotDetec (07 Mar 2022 07:08)  COVID-19 PCR: NotDetec (28 Feb 2022 11:17)  COVID-19 PCR: NotDetec (25 Feb 2022 00:43)      Radiology: Reviewed.   < from: Xray Chest 1 View-PORTABLE IMMEDIATE (Xray Chest 1 View-PORTABLE IMMEDIATE .) (08.15.22 @ 04:06) >    ACC: [id number] EXAM:  XR CHEST PORTABLE IMMED 1V                          PROCEDURE DATE:  08/15/2022          INTERPRETATION:  PROCEDURE: AP view of the chest.    CLINICAL INFORMATION: Sickle cell crisis.    COMPARISON: 6/23/2022.    FINDINGS:    Right chest medication port is noted.    Lungs: There are bilateral lung opacities.  Heart: There is cardiomegaly.  Mediastinum: The mediastinum is within normal limits.    IMPRESSION:    Bilateral lung opacities, suspicious for pneumonia.    --- End of Report ---            MEKHI ADHIKARI MD; Attending Radiologist  This document has been electronically signed. Aug 15 2022  7:25AM    < end of copied text >      ORT Score -   Family Hx of substance abuse	Female	      Male  Alcohol 	                                           1                     3  Illegal drugs	                                   2                     3  Rx drugs                                           4 	                  4  Personal Hx of substance abuse		  Alcohol 	                                          3	                  3  Illegal drugs                                     4	                  4  Rx drugs                                            5 	                  5  Age between 16- 45 years	           1                     1  hx preadolescent sexual abuse	   3 	                  0  Psychological disease		  ADD, OCD, bipolar, schizophrenia   2	          2  Depression                                           1 	          1  Total: 1    a score of 3 or lower indicates low risk for opioid abuse		  a score of 4-7 indicates moderate risk for opioid abuse		  a score of 8 or higher indicates high risk for opioid abuse    REVIEW OF SYSTEMS:  CONSTITUTIONAL: No fever or fatigue  HEENT:  No difficulty hearing, no change in vision  NECK: No pain or stiffness  RESPIRATORY: No cough, wheezing, chills or hemoptysis; No shortness of breath  CARDIOVASCULAR: No chest pain, palpitations, dizziness   GASTROINTESTINAL: No loss of appetite, decreased PO intake. No abdominal or epigastric pain. No nausea, vomiting; No diarrhea or constipation.   GENITOURINARY: No dysuria, frequency, hematuria, retention or incontinence  MUSCULOSKELETAL: + generalized joint pain, greatest over lower back + chronic b/l LE swelling; no upper or lower motor strength weakness, no saddle anesthesia, bowel/bladder incontinence, no falls   NEURO: No headaches, no numbness/tingling b/l LE, No weakness    PHYSICAL EXAM:  GENERAL:  Alert & Oriented X4, cooperative, NAD, Good concentration. Speech is clear.   RESPIRATORY: Respirations even and unlabored. Clear to auscultation bilaterally; No rales, rhonchi, wheezing, or rubs  CARDIOVASCULAR: Normal S1/S2, regular rate and rhythm; No murmurs, rubs, or gallops. No JVD. + right CW mediport  GASTROINTESTINAL:  Soft, Nontender, Nondistended; Bowel sounds present  PERIPHERAL VASCULAR:  Extremities warm with moderate b/l LE edema. 2+ Peripheral Pulses, No cyanosis, No calf tenderness  MUSCULOSKELETAL: Motor Strength 5/5 B/L upper and lower extremities; moves all extremities equally against gravity; ROM intact; + generalized joint tenderness.   SKIN: +icterus; Warm, dry, intact. No rashes, lesions, scars or wounds.     Risk factors associated with adverse outcomes related to opioid treatment  [ ]  Concurrent benzodiazepine use  [ ]  History/ Active substance use or alcohol use disorder  [ ] Psychiatric co-morbidity  [ ] Sleep apnea  [ ] COPD  [ ] BMI> 35  [ X] Liver dysfunction  [ X] Renal dysfunction  [ ] CHF  [ x] Smoker- marijuana   [ ]  Age > 60 years    [X ]  [place]  Reviewed and Copied to Chart. See below.    Plan of care and goal oriented pain management treatment options were discussed with patient and /or primary care giver; all questions and concerns were addressed and care was aligned with patient's wishes.    Educated patient on goal oriented pain management treatment options

## 2022-08-15 NOTE — H&P ADULT - PROBLEM SELECTOR PLAN 3
DVT- none  GI - none DVT- holding due to severe crisis; holding SCDs due to pain   GI - none h/o gout, on allopurinol 100 and colchicine 0.6  C/w allopurinol, holding colchicine due to MATA

## 2022-08-15 NOTE — DISCHARGE NOTE PROVIDER - NSDCMRMEDTOKEN_GEN_ALL_CORE_FT
colchicine 0.6 mg oral tablet: 1 tab(s) orally once a day  folic acid 1 mg oral tablet: 1 tab(s) orally 2 times a day   allopurinol 100 mg oral tablet: 1 tab(s) orally once a day  folic acid 1 mg oral tablet: 1 tab(s) orally 2 times a day  oxyCODONE 30 mg oral tablet: 1 tab(s) orally every 8 hours, As Needed  polyethylene glycol 3350 oral powder for reconstitution: 17 gram(s) orally once a day, As Needed - for constipation

## 2022-08-15 NOTE — H&P ADULT - PROBLEM SELECTOR PLAN 2
h/o gout, on allopurinol 100 and colchicine 0.6  Cw allopurinol h/o gout, on allopurinol 100 and colchicine 0.6  C/w allopurinol, holding colchicine due to MATA Cr. 1.32 (Baseline 0.8-1.0)  Avoid NSAIDs, can worsen ATN  cw fluids, 1L 1/2NS at 75mL/hr

## 2022-08-15 NOTE — H&P ADULT - PROBLEM SELECTOR PLAN 1
h/o sickle cell crisis, not on hydroxyurea due to h/o priapism  multiple history of crisis, last crisis 1.5 months ago  Hbg 4.8 - baseline around 6  Bilirubin 6.6  s/p 1uPRBC, 1g magnesium     f/u CXR, lactate, reticulocyte count, px mngmt   cw oxycodone 30 q4 PRN  cw dilaudid 2 mg q4 PRN  cw bowel regiment   cw folic acid h/o sickle cell crisis, not on hydroxyurea due to h/o priapism  multiple history of crisis, last crisis 1.5 months ago  Hbg 4.8 - baseline around 6, currently symptomatic   Bilirubin 6.6  CXR with mild bilateral infiltrates, no signs of sepsis; will hold ABx as less concern for Acute chest syndrome   Lower limbs swollen but no tenderness; less concern for dactylitis at this time   s/p 1u PRBC; goal>6g/dl; 1g magnesium given  f/u CXR, lactate, reticulocyte count, px mngmt   cw oxycodone 30 q4 PRN, dilaudid 2 mg q4 PRN  cw bowel regimen   cw folic acid  Heme-Onc Dr. Jordan consulted h/o sickle cell crisis, not on hydroxyurea due to h/o priapism  multiple history of crisis, last crisis 1.5 months ago  Hbg 4.8 - baseline around 6, currently symptomatic   Bilirubin 6.6  CXR with mild bilateral infiltrates, no signs of sepsis; will hold ABx as less concern for Acute chest syndrome   Lower limbs swollen but no tenderness; less concern for dactylitis at this time   s/p 1u PRBC; goal>6g/dl; 1g magnesium given  f/u CXR, lactate, reticulocyte count, px mngmt   cw oxycodone 30 q4 PRN, dilaudid 2 mg q4 PRN  cw bowel regimen   cw folic acid  cw incentive spirometry  Heme-Onc Dr. Jordan consulted

## 2022-08-15 NOTE — CONSULT NOTE ADULT - SUBJECTIVE AND OBJECTIVE BOX
PULMONARY CONSULT NOTE      TOLU VARGAS  MRN-[id number]    History of Present Illness:  Reason for Admission: sickle cell crisis  History of Present Illness:   43 yo M w/ PMHx sickle cell disease, gout and cholecystectomy who presents with knee and back pain x 2 days. Pt states he comes in every month for blood transfusions 2/2 to sickle cell disease. He states his pain is a 10/10 sharp pain that is in most of the joints in his body, specifically jaw, knee, mouth, and back. Patient states he is also dizzy, has a right sided headache, is nauseous, and has worsening lower extremity swelling. Otherwise, denies CP, SOB, Fever, chills, constipation, diarrhea, numbness/tingling, bleeding, visual disturbances, or fevers. Of note, he is up to date on his immunizations.         HISTORY OF PRESENT ILLNESS:  As above. Awake, alert, comfortable in bed in NAD. Mild Coles but no cough or Asthma hx. No smoking hx    MEDICATIONS  (STANDING):  allopurinol 100 milliGRAM(s) Oral daily  folic acid 1 milliGRAM(s) Oral daily  multivitamin 1 Tablet(s) Oral daily  pantoprazole    Tablet 40 milliGRAM(s) Oral before breakfast  polyethylene glycol 3350 17 Gram(s) Oral daily  senna 2 Tablet(s) Oral at bedtime  sodium chloride 0.45%. 1000 milliLiter(s) (75 mL/Hr) IV Continuous <Continuous>      MEDICATIONS  (PRN):  HYDROmorphone  Injectable 2 milliGRAM(s) IV Push every 4 hours PRN Severe Pain (7 - 10)  oxyCODONE    IR 30 milliGRAM(s) Oral every 4 hours PRN Moderate Pain (4 - 6)      Allergies    ceftriaxone (Anaphylaxis)  hydroxyurea (Other)  penicillin (Pruritus)  piperacillin-tazobactam (Urticaria)    Intolerances        PAST MEDICAL & SURGICAL HISTORY:  Sickle cell anemia      Gout      History of cholecystectomy          FAMILY HISTORY:  Family history of sickle cell trait (Father, Mother)        SOCIAL HISTORY  Smoking History:     REVIEW OF SYSTEMS:    CONSTITUTIONAL:  No fevers, chills, sweats    HEENT:  Eyes:  No diplopia or blurred vision. ENT:  No earache, sore throat or runny nose.    CARDIOVASCULAR:  No pressure, squeezing, tightness, or heaviness about the chest; no palpitations.    RESPIRATORY:  Per HPI    GASTROINTESTINAL:  No abdominal pain, nausea, vomiting or diarrhea.    GENITOURINARY:  No dysuria, frequency or urgency.    NEUROLOGIC:  No paresthesias, fasciculations, seizures or weakness.    PSYCHIATRIC:  No disorder of thought or mood.    Vital Signs Last 24 Hrs  T(C): 36.1 (15 Aug 2022 07:43), Max: 36.9 (15 Aug 2022 00:56)  T(F): 97 (15 Aug 2022 07:43), Max: 98.5 (15 Aug 2022 00:56)  HR: 84 (15 Aug 2022 07:43) (75 - 98)  BP: 116/73 (15 Aug 2022 07:43) (116/73 - 134/70)  BP(mean): --  RR: 18 (15 Aug 2022 07:43) (18 - 20)  SpO2: 97% (15 Aug 2022 07:43) (91% - 98%)    Parameters below as of 15 Aug 2022 07:43  Patient On (Oxygen Delivery Method): room air      I&O's Detail      PHYSICAL EXAMINATION:    GENERAL: The patient is a well-developed, well-nourished _____in no apparent distress.     HEENT: Head is normocephalic and atraumatic. Extraocular muscles are intact. Mucous membranes are moist.     NECK: Supple.     LUNGS: Clear to auscultation without wheezing, rales, or rhonchi. Respirations unlabored    HEART: Regular rate and rhythm without murmur.    ABDOMEN: Soft, nontender, and nondistended.  No hepatosplenomegaly is noted.    EXTREMITIES: Without any cyanosis, clubbing, rash, lesions or edema.    NEUROLOGIC: Grossly intact.      LABS:                        4.8    10.64 )-----------( 243      ( 15 Aug 2022 02:10 )             13.4     08-15    139  |  110<H>  |  23<H>  ----------------------------<  94  4.6   |  21<L>  |  1.32<H>    Ca    8.6      15 Aug 2022 02:10    TPro  7.4  /  Alb  3.6  /  TBili  6.6<H>  /  DBili  x   /  AST  91<H>  /  ALT  52  /  AlkPhos  149<H>  08-15                        MICROBIOLOGY:    RADIOLOGY & ADDITIONAL STUDIES:    CXR:  < from: Xray Chest 1 View-PORTABLE IMMEDIATE (Xray Chest 1 View-PORTABLE IMMEDIATE .) (08.15.22 @ 04:06) >  IMPRESSION:    Bilateral lung opacities, suspicious for pneumonia.    < end of copied text >    Ct scan chest;    ekg;    echo:

## 2022-08-15 NOTE — ED PROVIDER NOTE - OBJECTIVE STATEMENT
43 y/o male with PMHx sickle cell anemia presents to ED c.o body aches. Patient notes diffuse body aches progressively worsening x1 week. Patient denies fever, chest pain and shortness of breath. H&H 4.8/13, case discussed with hematologist Dr. Calle who will transfuse x2 units and pack blood cell. NKDA.

## 2022-08-15 NOTE — DISCHARGE NOTE PROVIDER - CARE PROVIDER_API CALL
Chuck Jordan  INTERNAL MEDICINE  [address] [place] [address]  Phone: [phone]  Fax: [phone]  Follow Up Time:

## 2022-08-15 NOTE — ED ADULT NURSE REASSESSMENT NOTE - NS ED NURSE REASSESS COMMENT FT1
Pt resting in bed, A&OX3, admitted to medicine service, awaiting floor bed. Mediport noted to right chest, patent. No acute distress noted, denies chest pain, no shortness of breath indicated.

## 2022-08-15 NOTE — DISCHARGE NOTE PROVIDER - NSDCCPCAREPLAN_GEN_ALL_CORE_FT
PRINCIPAL DISCHARGE DIAGNOSIS  Diagnosis: Anemia, sickle cell with crisis  Assessment and Plan of Treatment: You were admitted for sickle cell crisis.  Continue to follow up with your primary care doctor and your hematologist as outpatient.  Keep yourself well hydrated and continue taking your medication as prescribed.      SECONDARY DISCHARGE DIAGNOSES  Diagnosis: MATA (acute kidney injury)  Assessment and Plan of Treatment: Your kidney function was mildly elevated from your baseline.  Keep yourself well hydrated.    Diagnosis: Gout  Assessment and Plan of Treatment: Continue taking your medication as prescribed.     PRINCIPAL DISCHARGE DIAGNOSIS  Diagnosis: Anemia, sickle cell with crisis  Assessment and Plan of Treatment: You were admitted for sickle cell crisis.  Hemoglobin on admission was 4.8.  Hematology-Oncology was consulted and involved in your care. You were given 2 units of Red Blood Cell transusion in total during your hospital stay. Hemoglobin up trended to 6.9. Please continue to follow up with your primary care doctor and your hematologist as outpatient.  Keep yourself well hydrated and continue taking your medication as prescribed.      SECONDARY DISCHARGE DIAGNOSES  Diagnosis: Gout  Assessment and Plan of Treatment: You have history of Gout which is a disease in which defective metabolism of uric acid causes arthritis, especially in the smaller bones of the feet and episodes of acute pain. Please continue to take your HOME medications and follow up with your PCP in a week from discharge.    Diagnosis: MATA (acute kidney injury)  Assessment and Plan of Treatment:   Your kidney function was mildly elevated from your baseline.  Keep yourself well hydrated.     PRINCIPAL DISCHARGE DIAGNOSIS  Diagnosis: Anemia, sickle cell with crisis  Assessment and Plan of Treatment: You were admitted for sickle cell crisis.  Hemoglobin on admission was 4.8.  Hematology-Oncology was consulted and involved in your care. You were given 2 units of Red Blood Cell transusion in total during your hospital stay. Hemoglobin up trended to 6.9. Please continue to follow up with your primary care doctor and your hematologist as outpatient.  Keep yourself well hydrated and continue taking your medication as prescribed.      SECONDARY DISCHARGE DIAGNOSES  Diagnosis: Gout  Assessment and Plan of Treatment: You have history of Gout which is a disease in which defective metabolism of uric acid causes arthritis, especially in the smaller bones of the feet and episodes of acute pain. Please continue to take your HOME medications and follow up with your PCP in a week from discharge.    Diagnosis: Acute joint pain  Assessment and Plan of Treatment: You were diagnosed with bilateral knee pain likely secondary to Sickle cell crisis or gout. Pain management was invloved in your care. You were managed with Hydromorphone and oxycodone. You were started on IVF and continued on allupurinol. Please follow up with your PCP in a week from discharge for further recommendations.    Diagnosis: MATA (acute kidney injury)  Assessment and Plan of Treatment: MATA (acute kidney injury) is a condition in which the kidneys suddenly can't filter waste from the blood. Acute renal failure develops rapidly over a few hours or daysl. It's most common in those who are critically ill and already hospitalized. Symptoms include decreased urinary output, swelling due to fluid retention, nausea, fatigue, and shortness of breath. Sometimes symptoms may be subtle or may not appear at all. In addition to addressing the underlying cause, treatments include fluids, medication, and dialysis. A marker of your kidney function is your serum BUN/Creatinine ratio which elevates when the kidney gets injured. Your Creatinine was elevated and then improved down to baseline. Your last serum creatinine level was 1.32 on admission and now downtrended to 0.71. Please follow up with your PCP in a week from discharge for further recommendations.    Diagnosis: Muscle pain  Assessment and Plan of Treatment: You were complaining of muscle pain on shoulders, thighs and back.  Periodic episodes of extreme pain, called pain crises, are a major symptom of sickle cell anemia. Pain develops when sickle-shaped red blood cells block blood flow through tiny blood vessels to your chest, abdomen and joints. The pain varies in intensity and can last for a few hours to a few days. Pain management was invloved in your care. You were managed with Hydromorphone and oxycodone. You were started on IVF and continued on allupurinol. Please follow up with your PCP in a week from discharge for further recommendations.     PRINCIPAL DISCHARGE DIAGNOSIS  Diagnosis: Anemia, sickle cell with crisis  Assessment and Plan of Treatment: You were admitted for sickle cell crisis.  Hemoglobin on admission was 4.8.  Hematology-Oncology was consulted and involved in your care. You were given 2 units of Red Blood Cell transusion in total during your hospital stay. Hemoglobin up trended to 6.9. Please continue to follow up with your primary care doctor and your hematologist as outpatient.  Keep yourself well hydrated and continue taking your medication as prescribed.      SECONDARY DISCHARGE DIAGNOSES  Diagnosis: Gout  Assessment and Plan of Treatment: You have history of Gout which is a disease in which defective metabolism of uric acid causes arthritis, especially in the smaller bones of the feet and episodes of acute pain. Please continue to take your HOME medications and follow up with your PCP in a week from discharge.    Diagnosis: MATA (acute kidney injury)  Assessment and Plan of Treatment: MATA (acute kidney injury) is a condition in which the kidneys suddenly can't filter waste from the blood. Acute renal failure develops rapidly over a few hours or daysl. It's most common in those who are critically ill and already hospitalized. Symptoms include decreased urinary output, swelling due to fluid retention, nausea, fatigue, and shortness of breath. Sometimes symptoms may be subtle or may not appear at all. In addition to addressing the underlying cause, treatments include fluids, medication, and dialysis. A marker of your kidney function is your serum BUN/Creatinine ratio which elevates when the kidney gets injured. Your Creatinine was elevated and then improved down to baseline. Your last serum creatinine level was 1.32 on admission and now downtrended to 0.71. Please follow up with your PCP in a week from discharge for further recommendations.    Diagnosis: Acute joint pain  Assessment and Plan of Treatment: You were diagnosed with bilateral knee pain likely secondary to Sickle cell crisis or gout. Pain management was invloved in your care. You were managed with Hydromorphone and oxycodone. You were started on IVF and continued on allupurinol. Please follow up with your PCP in a week from discharge for further recommendations.    Diagnosis: Muscle pain  Assessment and Plan of Treatment: You were complaining of muscle pain on shoulders, thighs and back.  Periodic episodes of extreme pain, called pain crises, are a major symptom of sickle cell anemia. Pain develops when sickle-shaped red blood cells block blood flow through tiny blood vessels to your chest, abdomen and joints. The pain varies in intensity and can last for a few hours to a few days. Pain management was invloved in your care. You were managed with Hydromorphone and oxycodone. You were started on IVF and continued on allupurinol. Please follow up with your PCP in a week from discharge for further recommendations.     PRINCIPAL DISCHARGE DIAGNOSIS  Diagnosis: Anemia, sickle cell with crisis  Assessment and Plan of Treatment: You were admitted for sickle cell crisis.  Hemoglobin on admission was 4.8.  Hematology-Oncology was consulted and involved in your care. You were given 2 units of Red Blood Cell transusion in total during your hospital stay. Hemoglobin up trended to 6.9. Please continue to follow up with your primary care doctor and your hematologist as outpatient.  Keep yourself well hydrated and continue taking your medication as prescribed.      SECONDARY DISCHARGE DIAGNOSES  Diagnosis: Gout  Assessment and Plan of Treatment: You have history of Gout which is a disease in which defective metabolism of uric acid causes arthritis, especially in the smaller bones of the feet and episodes of acute pain. continue to take allopurinol daily. follow up with your PCP in a week from discharge.    Diagnosis: MATA (acute kidney injury)  Assessment and Plan of Treatment: MATA (acute kidney injury) is a condition in which the kidneys suddenly can't filter waste from the blood. Acute renal failure develops rapidly over a few hours or daysl. It's most common in those who are critically ill and already hospitalized. Symptoms include decreased urinary output, swelling due to fluid retention, nausea, fatigue, and shortness of breath. Sometimes symptoms may be subtle or may not appear at all. In addition to addressing the underlying cause, treatments include fluids, medication, and dialysis. A marker of your kidney function is your serum BUN/Creatinine ratio which elevates when the kidney gets injured. Your Creatinine was elevated and then improved down to baseline. Your last serum creatinine level was 1.32 on admission and now downtrended to 0.71. Please follow up with your PCP in a week from discharge for further recommendations.    Diagnosis: Acute joint pain  Assessment and Plan of Treatment: You were diagnosed with bilateral knee pain likely secondary to Sickle cell crisis or gout. Pain management was invloved in your care. You were managed with Hydromorphone and oxycodone. You were started on IVF and continued on allupurinol. Please follow up with your PCP in a week from discharge for further recommendations.    Diagnosis: Muscle pain  Assessment and Plan of Treatment: You were complaining of muscle pain on shoulders, thighs and back.  Periodic episodes of extreme pain, called pain crises, are a major symptom of sickle cell anemia. Pain develops when sickle-shaped red blood cells block blood flow through tiny blood vessels to your chest, abdomen and joints. The pain varies in intensity and can last for a few hours to a few days. Pain management was invloved in your care. You were managed with Hydromorphone and oxycodone. You were started on IVF and continued on allupurinol. Please follow up with your PCP in a week from discharge for further recommendations.

## 2022-08-16 DIAGNOSIS — R17 UNSPECIFIED JAUNDICE: ICD-10-CM

## 2022-08-16 LAB
ALBUMIN SERPL ELPH-MCNC: 3.1 G/DL — LOW (ref 3.5–5)
ALP SERPL-CCNC: 142 U/L — HIGH (ref 40–120)
ALT FLD-CCNC: 48 U/L DA — SIGNIFICANT CHANGE UP (ref 10–60)
ANION GAP SERPL CALC-SCNC: 5 MMOL/L — SIGNIFICANT CHANGE UP (ref 5–17)
AST SERPL-CCNC: 98 U/L — HIGH (ref 10–40)
BILIRUB SERPL-MCNC: 5.9 MG/DL — HIGH (ref 0.2–1.2)
BUN SERPL-MCNC: 18 MG/DL — SIGNIFICANT CHANGE UP (ref 7–18)
CALCIUM SERPL-MCNC: 8.6 MG/DL — SIGNIFICANT CHANGE UP (ref 8.4–10.5)
CHLORIDE SERPL-SCNC: 113 MMOL/L — HIGH (ref 96–108)
CO2 SERPL-SCNC: 22 MMOL/L — SIGNIFICANT CHANGE UP (ref 22–31)
CREAT SERPL-MCNC: 0.82 MG/DL — SIGNIFICANT CHANGE UP (ref 0.5–1.3)
EGFR: 112 ML/MIN/1.73M2 — SIGNIFICANT CHANGE UP
GLUCOSE SERPL-MCNC: 93 MG/DL — SIGNIFICANT CHANGE UP (ref 70–99)
HCT VFR BLD CALC: 15.3 % — CRITICAL LOW (ref 39–50)
HGB BLD-MCNC: 5.7 G/DL — CRITICAL LOW (ref 13–17)
LDH SERPL L TO P-CCNC: 783 U/L — HIGH (ref 120–225)
MAGNESIUM SERPL-MCNC: 2.1 MG/DL — SIGNIFICANT CHANGE UP (ref 1.6–2.6)
MCHC RBC-ENTMCNC: 33.3 PG — SIGNIFICANT CHANGE UP (ref 27–34)
MCHC RBC-ENTMCNC: 37.3 GM/DL — HIGH (ref 32–36)
MCV RBC AUTO: 89.5 FL — SIGNIFICANT CHANGE UP (ref 80–100)
NRBC # BLD: 2 /100 WBCS — HIGH (ref 0–0)
PHOSPHATE SERPL-MCNC: 3.8 MG/DL — SIGNIFICANT CHANGE UP (ref 2.5–4.5)
PLATELET # BLD AUTO: 163 K/UL — SIGNIFICANT CHANGE UP (ref 150–400)
POTASSIUM SERPL-MCNC: 4.8 MMOL/L — SIGNIFICANT CHANGE UP (ref 3.5–5.3)
POTASSIUM SERPL-SCNC: 4.8 MMOL/L — SIGNIFICANT CHANGE UP (ref 3.5–5.3)
PROT SERPL-MCNC: 6.9 G/DL — SIGNIFICANT CHANGE UP (ref 6–8.3)
RBC # BLD: 1.71 M/UL — LOW (ref 4.2–5.8)
RBC # BLD: 1.71 M/UL — LOW (ref 4.2–5.8)
RBC # FLD: 22.5 % — HIGH (ref 10.3–14.5)
RETICS #: 220.8 K/UL — HIGH (ref 25–125)
RETICS/RBC NFR: 12.9 % — HIGH (ref 0.5–2.5)
SODIUM SERPL-SCNC: 140 MMOL/L — SIGNIFICANT CHANGE UP (ref 135–145)
WBC # BLD: 8.17 K/UL — SIGNIFICANT CHANGE UP (ref 3.8–10.5)
WBC # FLD AUTO: 8.17 K/UL — SIGNIFICANT CHANGE UP (ref 3.8–10.5)

## 2022-08-16 PROCEDURE — 99232 SBSQ HOSP IP/OBS MODERATE 35: CPT

## 2022-08-16 RX ORDER — ACETAMINOPHEN 500 MG
975 TABLET ORAL ONCE
Refills: 0 | Status: COMPLETED | OUTPATIENT
Start: 2022-08-16 | End: 2022-08-16

## 2022-08-16 RX ORDER — DIPHENHYDRAMINE HCL 50 MG
50 CAPSULE ORAL ONCE
Refills: 0 | Status: COMPLETED | OUTPATIENT
Start: 2022-08-16 | End: 2022-08-16

## 2022-08-16 RX ADMIN — LIDOCAINE 1 PATCH: 4 CREAM TOPICAL at 13:58

## 2022-08-16 RX ADMIN — Medication 1 MILLIGRAM(S): at 11:57

## 2022-08-16 RX ADMIN — POLYETHYLENE GLYCOL 3350 17 GRAM(S): 17 POWDER, FOR SOLUTION ORAL at 11:57

## 2022-08-16 RX ADMIN — HYDROMORPHONE HYDROCHLORIDE 2 MILLIGRAM(S): 2 INJECTION INTRAMUSCULAR; INTRAVENOUS; SUBCUTANEOUS at 14:20

## 2022-08-16 RX ADMIN — HYDROMORPHONE HYDROCHLORIDE 2 MILLIGRAM(S): 2 INJECTION INTRAMUSCULAR; INTRAVENOUS; SUBCUTANEOUS at 17:54

## 2022-08-16 RX ADMIN — HYDROMORPHONE HYDROCHLORIDE 2 MILLIGRAM(S): 2 INJECTION INTRAMUSCULAR; INTRAVENOUS; SUBCUTANEOUS at 21:57

## 2022-08-16 RX ADMIN — HYDROMORPHONE HYDROCHLORIDE 2 MILLIGRAM(S): 2 INJECTION INTRAMUSCULAR; INTRAVENOUS; SUBCUTANEOUS at 17:14

## 2022-08-16 RX ADMIN — SENNA PLUS 2 TABLET(S): 8.6 TABLET ORAL at 23:20

## 2022-08-16 RX ADMIN — HYDROMORPHONE HYDROCHLORIDE 2 MILLIGRAM(S): 2 INJECTION INTRAMUSCULAR; INTRAVENOUS; SUBCUTANEOUS at 00:38

## 2022-08-16 RX ADMIN — SENNA PLUS 2 TABLET(S): 8.6 TABLET ORAL at 00:38

## 2022-08-16 RX ADMIN — HYDROMORPHONE HYDROCHLORIDE 2 MILLIGRAM(S): 2 INJECTION INTRAMUSCULAR; INTRAVENOUS; SUBCUTANEOUS at 13:14

## 2022-08-16 RX ADMIN — Medication 50 MILLIGRAM(S): at 18:45

## 2022-08-16 RX ADMIN — HYDROMORPHONE HYDROCHLORIDE 2 MILLIGRAM(S): 2 INJECTION INTRAMUSCULAR; INTRAVENOUS; SUBCUTANEOUS at 05:03

## 2022-08-16 RX ADMIN — PANTOPRAZOLE SODIUM 40 MILLIGRAM(S): 20 TABLET, DELAYED RELEASE ORAL at 09:00

## 2022-08-16 RX ADMIN — OXYCODONE HYDROCHLORIDE 30 MILLIGRAM(S): 5 TABLET ORAL at 03:12

## 2022-08-16 RX ADMIN — Medication 100 MILLIGRAM(S): at 11:57

## 2022-08-16 RX ADMIN — SODIUM CHLORIDE 75 MILLILITER(S): 9 INJECTION, SOLUTION INTRAVENOUS at 17:09

## 2022-08-16 RX ADMIN — Medication 975 MILLIGRAM(S): at 19:00

## 2022-08-16 RX ADMIN — HYDROMORPHONE HYDROCHLORIDE 2 MILLIGRAM(S): 2 INJECTION INTRAMUSCULAR; INTRAVENOUS; SUBCUTANEOUS at 21:27

## 2022-08-16 RX ADMIN — LIDOCAINE 1 PATCH: 4 CREAM TOPICAL at 20:00

## 2022-08-16 RX ADMIN — HYDROMORPHONE HYDROCHLORIDE 2 MILLIGRAM(S): 2 INJECTION INTRAMUSCULAR; INTRAVENOUS; SUBCUTANEOUS at 09:11

## 2022-08-16 RX ADMIN — Medication 975 MILLIGRAM(S): at 18:45

## 2022-08-16 RX ADMIN — Medication 1 TABLET(S): at 11:57

## 2022-08-16 RX ADMIN — PANTOPRAZOLE SODIUM 40 MILLIGRAM(S): 20 TABLET, DELAYED RELEASE ORAL at 00:37

## 2022-08-16 RX ADMIN — HYDROMORPHONE HYDROCHLORIDE 2 MILLIGRAM(S): 2 INJECTION INTRAMUSCULAR; INTRAVENOUS; SUBCUTANEOUS at 09:45

## 2022-08-16 NOTE — PROGRESS NOTE ADULT - SUBJECTIVE AND OBJECTIVE BOX
Patient is a 42y old  Male who presents with a chief complaint of sickle cell crisis (16 Aug 2022 07:50)    pt seen in icu [  ], reg med floor [   ], bed [  ], chair at bedside [   ], a+o x3 [  ], lethargic [  ],  nad [  ]    daniel [  ], ngt [  ], peg [  ], et tube [  ], cent line [  ], picc line [  ]        Allergies    ceftriaxone (Anaphylaxis)  hydroxyurea (Other)  penicillin (Pruritus)  piperacillin-tazobactam (Urticaria)        Vitals    T(F): 98.4 (08-16-22 @ 05:32), Max: 98.4 (08-16-22 @ 00:06)  HR: 89 (08-16-22 @ 05:32) (74 - 89)  BP: 135/78 (08-16-22 @ 05:32) (130/77 - 147/85)  RR: 18 (08-16-22 @ 05:32) (17 - 18)  SpO2: 94% (08-16-22 @ 05:32) (94% - 97%)  Wt(kg): --  CAPILLARY BLOOD GLUCOSE          Labs                          6.2    9.79  )-----------( 242      ( 15 Aug 2022 11:26 )             17.1       08-15    141  |  113<H>  |  20<H>  ----------------------------<  93  4.4   |  21<L>  |  0.99    Ca    8.7      15 Aug 2022 11:26  Phos  4.1     08-15  Mg     2.4     08-15    TPro  7.5  /  Alb  3.4<L>  /  TBili  6.1<H>  /  DBili  x   /  AST  93<H>  /  ALT  52  /  AlkPhos  150<H>  08-15            .Blood Blood-Peripheral  06-23 @ 06:09   No Growth Final  --  --      .Blood Blood  06-01 @ 18:46   No Growth Final  --  --          Radiology Results      Meds    MEDICATIONS  (STANDING):  allopurinol 100 milliGRAM(s) Oral daily  folic acid 1 milliGRAM(s) Oral daily  lidocaine   4% Patch 1 Patch Transdermal daily  multivitamin 1 Tablet(s) Oral daily  pantoprazole    Tablet 40 milliGRAM(s) Oral before breakfast  polyethylene glycol 3350 17 Gram(s) Oral daily  senna 2 Tablet(s) Oral at bedtime  sodium chloride 0.45%. 1000 milliLiter(s) (75 mL/Hr) IV Continuous <Continuous>      MEDICATIONS  (PRN):  HYDROmorphone  Injectable 2 milliGRAM(s) IV Push every 4 hours PRN Severe Pain (7 - 10)  oxyCODONE    IR 30 milliGRAM(s) Oral every 4 hours PRN Moderate Pain (4 - 6)      Physical Exam    Neuro :  no focal deficits  Respiratory: CTA B/L  CV: RRR, S1S2, no murmurs,   Abdominal: Soft, NT, ND +BS,  Extremities: No edema, + peripheral pulses    ASSESSMENT    Hemoglobin SS disease with crisis    Sickle cell anemia    Gout    Anemia requiring transfusions    Marijuana abuse    Pneumonia    History of cholecystectomy        PLAN     Patient is a 42y old  Male who presents with a chief complaint of sickle cell crisis (16 Aug 2022 07:50)    pt seen in icu [  ], reg med floor [   ], bed [  ], chair at bedside [   ], a+o x3 [  ], lethargic [  ],  nad [  ]    daniel [  ], ngt [  ], peg [  ], et tube [  ], cent line [  ], picc line [  ]        Allergies    ceftriaxone (Anaphylaxis)  hydroxyurea (Other)  penicillin (Pruritus)  piperacillin-tazobactam (Urticaria)        Vitals    T(F): 98.4 (08-16-22 @ 05:32), Max: 98.4 (08-16-22 @ 00:06)  HR: 89 (08-16-22 @ 05:32) (74 - 89)  BP: 135/78 (08-16-22 @ 05:32) (130/77 - 147/85)  RR: 18 (08-16-22 @ 05:32) (17 - 18)  SpO2: 94% (08-16-22 @ 05:32) (94% - 97%)  Wt(kg): --  CAPILLARY BLOOD GLUCOSE          Labs                          6.2    9.79  )-----------( 242      ( 15 Aug 2022 11:26 )             17.1       08-15    141  |  113<H>  |  20<H>  ----------------------------<  93  4.4   |  21<L>  |  0.99    Ca    8.7      15 Aug 2022 11:26  Phos  4.1     08-15  Mg     2.4     08-15    TPro  7.5  /  Alb  3.4<L>  /  TBili  6.1<H>  /  DBili  x   /  AST  93<H>  /  ALT  52  /  AlkPhos  150<H>  08-15            .Blood Blood-Peripheral  06-23 @ 06:09   No Growth Final  --  --      .Blood Blood  06-01 @ 18:46   No Growth Final  --  --          Radiology Results      Meds    MEDICATIONS  (STANDING):  allopurinol 100 milliGRAM(s) Oral daily  folic acid 1 milliGRAM(s) Oral daily  lidocaine   4% Patch 1 Patch Transdermal daily  multivitamin 1 Tablet(s) Oral daily  pantoprazole    Tablet 40 milliGRAM(s) Oral before breakfast  polyethylene glycol 3350 17 Gram(s) Oral daily  senna 2 Tablet(s) Oral at bedtime  sodium chloride 0.45%. 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  mata,   atelectasis,   h/o sickle cell disease,   gout   cholecystectomy      PLAN    s/p 1 unit prbc,   f/u rept cbc   if hgb < 6 trans 1 more unit prbc   heme onc f/u noted  cont pain control,   pain mgmt cons noted  Opioid pain recommendations   - Continue oxycodone 30 mg PO q 4 hours PRN moderate pain. (home dose) Monitor for sedation/ respiratory depression.   - Continue dilaudid 2mg IV q4h PRN severe pain. Monitor for sedation/ respiratory depression.   Non-opioid pain recommendations   - Avoid NSAIDs- anemia, MATA  - Avoid Acetaminophen, transaminitis   - Continue allopurinol 100mg PO daily  - Colchicine held per primary team d/t MATA  Bowel Regimen  - Miralax 17G PO daily  - Continue Senna 2 tablets at bedtime for constipation  pulm f/u   cont incentive spirometer,   cont current meds  d/c pending cbc or after next unit prbc if needed

## 2022-08-16 NOTE — PROGRESS NOTE ADULT - PROBLEM SELECTOR PLAN 1
Pt with acute generalized joint pain which is somatic in nature due to SCC. Retic % 17.7 -> 15.8 (8/15).+ anemia. s/p 1 unit PRBC 8/15 + MATA (improving) hx gout. Pt is opioid dependent.   IVF per primary team.   Opioid pain recommendations   - Continue oxycodone 30 mg PO q 4 hours PRN moderate pain. (home dose) Monitor for sedation/ respiratory depression.   - Continue dilaudid 2mg IV q4h PRN severe pain. Monitor for sedation/ respiratory depression.   Non-opioid pain recommendations   - Avoid NSAIDs- anemia, MATA  - Avoid Acetaminophen, transaminitis   - Continue allopurinol 100mg PO daily  - Colchicine held per primary team d/t MATA  Bowel Regimen  - Miralax 17G PO daily  - Continue Senna 2 tablets at bedtime for constipation  Mild pain   - Non-pharmacological pain treatment recommendations  - Warm/ Cool packs PRN   - Repositioning, imagery, relaxation, distraction.  - Physical therapy OOB if no contraindications   Recommendations discussed with primary team and RN.

## 2022-08-16 NOTE — PROGRESS NOTE ADULT - PROBLEM SELECTOR PLAN 1
h/o sickle cell crisis, not on hydroxyurea due to h/o priapism  multiple history of crisis, last crisis 1.5 months ago  Hbg 4.8 - baseline around 6, currently symptomatic   Bilirubin 6.6  CXR with mild bilateral infiltrates, no signs of sepsis; will hold ABx as less concern for Acute chest syndrome   Lower limbs swollen but no tenderness; less concern for dactylitis at this time   s/p 1u PRBC; goal>6g/dl; 1g magnesium given  f/u CXR, lactate, reticulocyte count, px mngmt   cw oxycodone 30 q4 PRN, dilaudid 2 mg q4 PRN  cw bowel regimen   cw folic acid  cw incentive spirometry  Heme-Onc Dr. Jordan consulted  Hb 5.7 now  Hem-onc consulted  PRBC 1 unit   Continue IVF  F/U CBC

## 2022-08-16 NOTE — PROGRESS NOTE ADULT - SUBJECTIVE AND OBJECTIVE BOX
CHIEF COMPLAINT  Sickle Cell Crisis    HISTORY OF PRESENT ILLNESS  TOLU VARGAS is a 42y Male who presents with a chief complaint of sickle cell crisis.    No acute events. No complaints.    REVIEW OF SYSTEMS  A complete review of systems was performed; negative except per HPI    PHYSICAL EXAM  T(C): 36.9 (08-16-22 @ 05:32), Max: 36.9 (08-16-22 @ 00:06)  HR: 89 (08-16-22 @ 05:32) (74 - 89)  BP: 135/78 (08-16-22 @ 05:32) (130/77 - 147/85)  RR: 18 (08-16-22 @ 05:32) (17 - 18)  SpO2: 94% (08-16-22 @ 05:32) (94% - 97%)  Constitutional: alert, awake, in no acute distress  Eyes: PERRL, EOMI  HEENT: normocephalic, atraumatic  Neck: supple, non-tender  Cardiovascular: normal perfusion, no peripheral edema  Respiratory: normal respiratory efforts; no increased use of accessory muscles  Gastrointestinal: soft, non-tender  Musculoskeletal: normal range of motion, no deformities noted  Neurological: alert, CN II to XI grossly intact  Skin: warm, dry    LABORATORY DATA                        6.2    9.79  )-----------( 242      ( 15 Aug 2022 11:26 )             17.1     08-15    141  |  113<H>  |  20<H>  ----------------------------<  93  4.4   |  21<L>  |  0.99    Ca    8.7      15 Aug 2022 11:26  Phos  4.1     08-15  Mg     2.4     08-15    TPro  7.5  /  Alb  3.4<L>  /  TBili  6.1<H>  /  DBili  x   /  AST  93<H>  /  ALT  52  /  AlkPhos  150<H>  08-15   CHIEF COMPLAINT  Sickle Cell Crisis    HISTORY OF PRESENT ILLNESS  TOLU VARGAS is a 42y Male who presents with a chief complaint of sickle cell crisis.    No acute events. No complaints. Pain is better controlled.    REVIEW OF SYSTEMS  A complete review of systems was performed; negative except per HPI    PHYSICAL EXAM  T(C): 36.9 (08-16-22 @ 05:32), Max: 36.9 (08-16-22 @ 00:06)  HR: 89 (08-16-22 @ 05:32) (74 - 89)  BP: 135/78 (08-16-22 @ 05:32) (130/77 - 147/85)  RR: 18 (08-16-22 @ 05:32) (17 - 18)  SpO2: 94% (08-16-22 @ 05:32) (94% - 97%)  Constitutional: alert, awake, in no acute distress  Eyes: PERRL, EOMI  HEENT: normocephalic, atraumatic  Neck: supple, non-tender  Cardiovascular: normal perfusion, no peripheral edema  Respiratory: normal respiratory efforts; no increased use of accessory muscles  Gastrointestinal: soft, non-tender  Musculoskeletal: normal range of motion, no deformities noted  Neurological: alert, CN II to XI grossly intact  Skin: warm, dry    LABORATORY DATA                        6.2    9.79  )-----------( 242      ( 15 Aug 2022 11:26 )             17.1     08-15    141  |  113<H>  |  20<H>  ----------------------------<  93  4.4   |  21<L>  |  0.99    Ca    8.7      15 Aug 2022 11:26  Phos  4.1     08-15  Mg     2.4     08-15    TPro  7.5  /  Alb  3.4<L>  /  TBili  6.1<H>  /  DBili  x   /  AST  93<H>  /  ALT  52  /  AlkPhos  150<H>  08-15

## 2022-08-16 NOTE — PROGRESS NOTE ADULT - SUBJECTIVE AND OBJECTIVE BOX
PGY-1 Progress Note discussed with attending    PAGER #: [phone]] TILL 5:00 PM  PLEASE CONTACT ON CALL TEAM:  - On Call Team (Please refer to Rubi) FROM 5:00 PM - 8:30PM  - Nightfloat Team FROM 8:30 -7:30 AM      INTERVAL HPI/OVERNIGHT EVENTS:   Patient seen and examined at bedside. No acute events overnight. Patient still complaining of  back and knee pain that has sliglty improved. He also mentioned having muscle pain on bilateral   upper and lower extremities. Denies seeing blood in th urine. Denies chest pain, SOB or palpitations.  Denies nausea or vomiting. Denies diarrhea. He does not have any other complains.     ROS  Constitutional: (-)fever, (-)night sweats, (-)chills, (-)cold intolerance, (-)fatigue  Eyes: (-)change in vision, (-)loss of vision, (-)blurred vision  Ears: (-)difficulty hearing, (-)hearing loss  Nose: (-)nasal discharge  Mouth/Throat/Voice: (-)lip sores, (-)dysphagia, (-)odynophagia  Neck: (-)neck pain, (-)neck stiffness, (-)neck lumps, (-)neck swelling  Respiratory: (-)dyspnea, (-)cough, (-)cough productive of sputum, (-)hemoptysis, (-)wheezing  Cardiovascular: (-)chest pain, (-)palpitations, (-)dyspnea at rest, (-)dyspnea with activity, (-)orthopnea  Gastrointestinal: (-)abdominal pain, (-)rectal pain, (-)nausea, (-)vomiting  Urinary: (-)dysuria, (-)hematuria, (-)urinary hesitancy, (-)difficulty initiating urine stream  Genital/Reproductive: (-)change in libido, (-)problems with sexual function, (-)dyspareunia, (-)difficulty achieving erection, (-)difficulty maintaining erection  Dermatologic/Integumentary: (-)change in hair texture, (-)change in skin texture, (-)change in nail appearance, (-)dry hair  Musculoskeletal: (+)muscle pain, (+)back pain, (+)tender points, (-)muscle cramps. (+)knee pain   Neurological: (-)headaches, (-)vertigo, (-)lightheadedness, (-)fainting  Psychiatric: (-)change in mood, (-)depression, (-)sadness interfering with function, (-)anxiety, (-)nervousness  Hematologic/Lymphatic: (-)easy bruising, (-)difficulty stopping blood flow    allopurinol 100 milliGRAM(s) Oral daily  folic acid 1 milliGRAM(s) Oral daily  HYDROmorphone  Injectable 2 milliGRAM(s) IV Push every 4 hours PRN  lidocaine   4% Patch 1 Patch Transdermal daily  multivitamin 1 Tablet(s) Oral daily  oxyCODONE    IR 30 milliGRAM(s) Oral every 4 hours PRN  pantoprazole    Tablet 40 milliGRAM(s) Oral before breakfast  polyethylene glycol 3350 17 Gram(s) Oral daily  senna 2 Tablet(s) Oral at bedtime  sodium chloride 0.45%. 1000 milliLiter(s) IV Continuous <Continuous>      Vital Signs Last 24 Hrs  T(C): 37 (16 Aug 2022 14:00), Max: 37 (16 Aug 2022 14:00)  T(F): 98.6 (16 Aug 2022 14:00), Max: 98.6 (16 Aug 2022 14:00)  HR: 84 (16 Aug 2022 14:00) (75 - 89)  BP: 130/72 (16 Aug 2022 14:00) (130/72 - 147/85)  BP(mean): --  RR: 18 (16 Aug 2022 14:00) (18 - 18)  SpO2: 95% (16 Aug 2022 14:00) (94% - 95%)    Parameters below as of 16 Aug 2022 14:00  Patient On (Oxygen Delivery Method): room air        PHYSICAL EXAMINATION:  GENERAL: patient seems a little uncomfortable   HEAD:  Atraumatic, Normocephalic  EYES:  Conjunctiva clear, yellow sclera, pupils are equal, round, and reactive to light and accommodation.  NECK: Supple, No JVD, trachea is midline, no evidence of thyroid enlargement, no lymphadenopathy or tenderness.  CHEST/LUNG: Clear to auscultation; No rales, rhonchi, wheezing, or rubs, port placed on right chest  HEART: Regular rate and rhythm; No murmurs, rubs, or gallops  ABDOMEN: Soft, Nontender, Nondistended; Bowel sounds present  NERVOUS SYSTEM:  Alert & Oriented X3; No focal sensory or motor deficits are noted; Recent and remote memory is intact; Appropriate mood and affect.  EXTREMITIES:  2+ Peripheral Pulses, No clubbing, no cyanosis, non pitting edema noted on lower legs, tenderness to palpation on upper and lower extremities, tenderness to palpation on paravertebral muscle on the lumbar area.   SKIN: Warm, dry, and well perfused; Good turgor; No lesions, nodules or rashes are noted.                          5.7    8.17  )-----------( 163      ( 16 Aug 2022 10:53 )             15.3     08-16    140  |  113<H>  |  18  ----------------------------<  93  4.8   |  22  |  0.82    Ca    8.6      16 Aug 2022 10:53  Phos  3.8     08-16  Mg     2.1     08-16    TPro  6.9  /  Alb  3.1<L>  /  TBili  5.9<H>  /  DBili  x   /  AST  98<H>  /  ALT  48  /  AlkPhos  142<H>  08-16    LIVER FUNCTIONS - ( 16 Aug 2022 10:53 )  Alb: 3.1 g/dL / Pro: 6.9 g/dL / ALK PHOS: 142 U/L / ALT: 48 U/L DA / AST: 98 U/L / GGT: x                   CAPILLARY BLOOD GLUCOSE      RADIOLOGY & ADDITIONAL TESTS:  < from: Xray Chest 1 View-PORTABLE IMMEDIATE (Xray Chest 1 View-PORTABLE IMMEDIATE .) (08.15.22 @ 04:06) >  IMPRESSION:    Bilateral lung opacities, suspicious for pneumonia.    < end of copied text >

## 2022-08-16 NOTE — PROGRESS NOTE ADULT - SUBJECTIVE AND OBJECTIVE BOX
Patient is a 42y old  Male who presents with a chief complaint of sickle cell crisis (16 Aug 2022 07:50)    Patient is awake, alert, laying comfortably in the bed in no acute distress at room air.    INTERVAL HPI/OVERNIGHT EVENTS:      VITAL SIGNS:  T(F): 98.4 (08-16-22 @ 05:32)  HR: 89 (08-16-22 @ 05:32)  BP: 135/78 (08-16-22 @ 05:32)  RR: 18 (08-16-22 @ 05:32)  SpO2: 94% (08-16-22 @ 05:32)  Wt(kg): --  I&O's Detail          REVIEW OF SYSTEMS:    CONSTITUTIONAL:  No fevers, chills, sweats    HEENT:  Eyes:  No diplopia or blurred vision. ENT:  No earache, sore throat or runny nose.    CARDIOVASCULAR:  No pressure, squeezing, tightness, or heaviness about the chest; no palpitations.    RESPIRATORY:  Per HPI    GASTROINTESTINAL:  No abdominal pain, nausea, vomiting or diarrhea.    GENITOURINARY:  No dysuria, frequency or urgency.    NEUROLOGIC:  No paresthesias, fasciculations, seizures or weakness.    PSYCHIATRIC:  No disorder of thought or mood.      PHYSICAL EXAM:    Constitutional: Well developed and nourished  Eyes: jaundiced  ENMT: normal  Neck:supple  Respiratory: good air entry  Cardiovascular: S1 S2 regular  Gastrointestinal: Soft, Non tender  Extremities: No edema  Vascular:normal  Neurological:Awake, alert,Ox3  Musculoskeletal:Normal      MEDICATIONS  (STANDING):  allopurinol 100 milliGRAM(s) Oral daily  folic acid 1 milliGRAM(s) Oral daily  lidocaine   4% Patch 1 Patch Transdermal daily  multivitamin 1 Tablet(s) Oral daily  pantoprazole    Tablet 40 milliGRAM(s) Oral before breakfast  polyethylene glycol 3350 17 Gram(s) Oral daily  senna 2 Tablet(s) Oral at bedtime  sodium chloride 0.45%. 1000 milliLiter(s) (75 mL/Hr) IV Continuous <Continuous>    MEDICATIONS  (PRN):  HYDROmorphone  Injectable 2 milliGRAM(s) IV Push every 4 hours PRN Severe Pain (7 - 10)  oxyCODONE    IR 30 milliGRAM(s) Oral every 4 hours PRN Moderate Pain (4 - 6)      Allergies    ceftriaxone (Anaphylaxis)  hydroxyurea (Other)  penicillin (Pruritus)  piperacillin-tazobactam (Urticaria)    Intolerances        LABS:                        6.2    9.79  )-----------( 242      ( 15 Aug 2022 11:26 )             17.1     08-15    141  |  113<H>  |  20<H>  ----------------------------<  93  4.4   |  21<L>  |  0.99    Ca    8.7      15 Aug 2022 11:26  Phos  4.1     08-15  Mg     2.4     08-15    TPro  7.5  /  Alb  3.4<L>  /  TBili  6.1<H>  /  DBili  x   /  AST  93<H>  /  ALT  52  /  AlkPhos  150<H>  08-15              CAPILLARY BLOOD GLUCOSE            RADIOLOGY & ADDITIONAL TESTS:    CXR:  < from: Xray Chest 1 View-PORTABLE IMMEDIATE (Xray Chest 1 View-PORTABLE IMMEDIATE .) (08.15.22 @ 04:06) >  IMPRESSION:    Bilateral lung opacities, suspicious for pneumonia.    --- End of Report ---      < end of copied text >      Ct scan chest:    ekg;    echo:

## 2022-08-16 NOTE — PROGRESS NOTE ADULT - SUBJECTIVE AND OBJECTIVE BOX
Source of information: TOLU VARGAS, Chart review  Patient language: English  : n/a    HPI:  41 yo M w/ PMHx sickle cell disease, gout and cholecystectomy who presents with knee and back pain x 2 days. Pt states he comes in every month for blood transfusions 2/2 to sickle cell disease. He states his pain is a 10/10 sharp pain that is in most of the joints in his body, specifically jaw, knee, mouth, and back. Patient states he is also dizzy, has a right sided headache, is nauseous, and has worsening lower extremity swelling. Otherwise, denies CP, SOB, Fever, chills, constipation, diarrhea, numbness/tingling, bleeding, visual disturbances, or fevers. Of note, he is up to date on his immunizations.  (15 Aug 2022 04:24)    Lactate 815.  Dx with sickle cell crisis. Pain consulted for joint pain. Seen by pain management in 6/2022 pt was on oxycodone 30mg PO q6h PRN and dialudid 3mg IV qh PRN. Pt with anemia, h/h 4.8/13.4 on admission, received 1 unit PRBC 8/15 retic % 17.7 -> 15.8 (8/15). + MATA (improving). Pt seen and examined at bedside. Pt laying in bed, reports generalized joint pain, greatest over lumbar back, rating score 6/10 and tolerable SCALE USED: (1-10 VNRS). Reports pain is slightly improved compared to yesterday. Prior to taking pain medications, reports pain score 8/10. Pt describes pain as constant, sharp, non- radiating, alleviated by pain medication, exacerbated by movement and palpation. Reports experiencing monthly crises, denies known triggers. Pt tolerating PO diet. Denies lethargy, chest pain, SOB, vomiting, constipation. Reports intermittent nausea, last BM 8/16. Patient stated goal for pain control: to be able to take deep breaths, get out of bed to chair and ambulate with tolerable pain control. Pt is opioid dependent, on oxycodone 30mg PO q4h PRN.    PAST MEDICAL & SURGICAL HISTORY:  Sickle cell anemia      Gout      History of cholecystectomy          FAMILY HISTORY:  Family history of sickle cell trait (Father, Mother)        Social History:  Lives at home (15 Aug 2022 04:24)   [X ] Denies ETOH use, illicit drug use   [X] daily marijuana smoking    Allergies    ceftriaxone (Anaphylaxis)  hydroxyurea (Other)  penicillin (Pruritus)  piperacillin-tazobactam (Urticaria)    MEDICATIONS  (STANDING):  allopurinol 100 milliGRAM(s) Oral daily  folic acid 1 milliGRAM(s) Oral daily  lidocaine   4% Patch 1 Patch Transdermal daily  multivitamin 1 Tablet(s) Oral daily  pantoprazole    Tablet 40 milliGRAM(s) Oral before breakfast  polyethylene glycol 3350 17 Gram(s) Oral daily  senna 2 Tablet(s) Oral at bedtime  sodium chloride 0.45%. 1000 milliLiter(s) (75 mL/Hr) IV Continuous <Continuous>    MEDICATIONS  (PRN):  HYDROmorphone  Injectable 2 milliGRAM(s) IV Push every 4 hours PRN Severe Pain (7 - 10)  oxyCODONE    IR 30 milliGRAM(s) Oral every 4 hours PRN Moderate Pain (4 - 6)      Vital Signs Last 24 Hrs  T(C): 36.9 (16 Aug 2022 05:32), Max: 36.9 (16 Aug 2022 00:06)  T(F): 98.4 (16 Aug 2022 05:32), Max: 98.4 (16 Aug 2022 00:06)  HR: 89 (16 Aug 2022 05:32) (74 - 89)  BP: 135/78 (16 Aug 2022 05:32) (130/77 - 147/85)  BP(mean): --  RR: 18 (16 Aug 2022 05:32) (17 - 18)  SpO2: 94% (16 Aug 2022 05:32) (94% - 97%)    Parameters below as of 16 Aug 2022 05:32  Patient On (Oxygen Delivery Method): room air      COVID-19 PCR: NotDetec (15 Aug 2022 02:57)  COVID-19 PCR: NotDetec (31 May 2022 02:47)  COVID-19 PCR: NotDetec (17 Mar 2022 06:25)  COVID-19 PCR: NotDetec (10 Mar 2022 19:07)  COVID-19 PCR: NotDetec (07 Mar 2022 07:08)  COVID-19 PCR: NotDetec (28 Feb 2022 11:17)  COVID-19 PCR: NotDetec (25 Feb 2022 00:43)    LABS: Reviewed                          6.2    9.79  )-----------( 242      ( 15 Aug 2022 11:26 )             17.1     08-15    141  |  113<H>  |  20<H>  ----------------------------<  93  4.4   |  21<L>  |  0.99    Ca    8.7      15 Aug 2022 11:26  Phos  4.1     08-15  Mg     2.4     08-15    TPro  7.5  /  Alb  3.4<L>  /  TBili  6.1<H>  /  DBili  x   /  AST  93<H>  /  ALT  52  /  AlkPhos  150<H>  08-15      LIVER FUNCTIONS - ( 15 Aug 2022 11:26 )  Alb: 3.4 g/dL / Pro: 7.5 g/dL / ALK PHOS: 150 U/L / ALT: 52 U/L DA / AST: 93 U/L / GGT: x     Radiology: Reviewed.   < from: Xray Chest 1 View-PORTABLE IMMEDIATE (Xray Chest 1 View-PORTABLE IMMEDIATE .) (08.15.22 @ 04:06) >    ACC: [id number] EXAM:  XR CHEST PORTABLE IMMED 1V                          PROCEDURE DATE:  08/15/2022          INTERPRETATION:  PROCEDURE: AP view of the chest.    CLINICAL INFORMATION: Sickle cell crisis.    COMPARISON: 6/23/2022.    FINDINGS:    Right chest medication port is noted.    Lungs: There are bilateral lung opacities.  Heart: There is cardiomegaly.  Mediastinum: The mediastinum is within normal limits.    IMPRESSION:    Bilateral lung opacities, suspicious for pneumonia.    --- End of Report ---            MEKHI ADHIKARI MD; Attending Radiologist  This document has been electronically signed. Aug 15 2022  7:25AM    < end of copied text >      ORT Score -   Family Hx of substance abuse	Female	      Male  Alcohol 	                                           1                     3  Illegal drugs	                                   2                     3  Rx drugs                                           4 	                  4  Personal Hx of substance abuse		  Alcohol 	                                          3	                  3  Illegal drugs                                     4	                  4  Rx drugs                                            5 	                  5  Age between 16- 45 years	           1                     1  hx preadolescent sexual abuse	   3 	                  0  Psychological disease		  ADD, OCD, bipolar, schizophrenia   2	          2  Depression                                           1 	          1  Total: 1    a score of 3 or lower indicates low risk for opioid abuse		  a score of 4-7 indicates moderate risk for opioid abuse		  a score of 8 or higher indicates high risk for opioid abuse    REVIEW OF SYSTEMS:  CONSTITUTIONAL: No fever or fatigue  HEENT:  No difficulty hearing, no change in vision  NECK: No pain or stiffness  RESPIRATORY: No cough, wheezing, chills or hemoptysis; No shortness of breath  CARDIOVASCULAR: No chest pain, palpitations, dizziness   GASTROINTESTINAL: No loss of appetite, decreased PO intake. No abdominal or epigastric pain. + intermittent nausea, no vomiting; No diarrhea or constipation.   GENITOURINARY: No dysuria, frequency, hematuria, retention or incontinence  MUSCULOSKELETAL: + generalized joint pain, greatest over lower back + chronic b/l LE swelling; no upper or lower motor strength weakness, no saddle anesthesia, bowel/bladder incontinence, no falls   NEURO: No headaches, no numbness/tingling b/l LE, No weakness    PHYSICAL EXAM:  GENERAL:  Alert & Oriented X4, cooperative, NAD, Good concentration. Speech is clear.   RESPIRATORY: Respirations even and unlabored. Clear to auscultation bilaterally; No rales, rhonchi, wheezing, or rubs  CARDIOVASCULAR: Normal S1/S2, regular rate and rhythm; No murmurs, rubs, or gallops. No JVD. + right CW mediport  GASTROINTESTINAL:  Soft, Nontender, Nondistended; Bowel sounds present  PERIPHERAL VASCULAR:  Extremities warm with moderate b/l LE edema. 2+ Peripheral Pulses, No cyanosis, No calf tenderness  MUSCULOSKELETAL: Motor Strength 5/5 B/L upper and lower extremities; moves all extremities equally against gravity; ROM intact; + generalized joint tenderness.   SKIN: +icterus; Warm, dry, intact. No rashes, lesions, scars or wounds.     Risk factors associated with adverse outcomes related to opioid treatment  [ ]  Concurrent benzodiazepine use  [ ]  History/ Active substance use or alcohol use disorder  [ ] Psychiatric co-morbidity  [ ] Sleep apnea  [ ] COPD  [ ] BMI> 35  [ X] Liver dysfunction  [ X] Renal dysfunction  [ ] CHF  [ x] Smoker- marijuana   [ ]  Age > 60 years    [X ]  [place]  Reviewed and Copied to Chart. See below.    Plan of care and goal oriented pain management treatment options were discussed with patient and /or primary care giver; all questions and concerns were addressed and care was aligned with patient's wishes.    Educated patient on goal oriented pain management treatment options     08-16-22 @ 11:02

## 2022-08-17 ENCOUNTER — TRANSCRIPTION ENCOUNTER (OUTPATIENT)
Age: 42
End: 2022-08-17

## 2022-08-17 VITALS
HEART RATE: 76 BPM | OXYGEN SATURATION: 95 % | TEMPERATURE: 98 F | RESPIRATION RATE: 18 BRPM | DIASTOLIC BLOOD PRESSURE: 78 MMHG | SYSTOLIC BLOOD PRESSURE: 135 MMHG

## 2022-08-17 LAB
ALBUMIN SERPL ELPH-MCNC: 3.1 G/DL — LOW (ref 3.5–5)
ALP SERPL-CCNC: 137 U/L — HIGH (ref 40–120)
ALT FLD-CCNC: 49 U/L DA — SIGNIFICANT CHANGE UP (ref 10–60)
ANION GAP SERPL CALC-SCNC: 8 MMOL/L — SIGNIFICANT CHANGE UP (ref 5–17)
AST SERPL-CCNC: 109 U/L — HIGH (ref 10–40)
BILIRUB SERPL-MCNC: 6.2 MG/DL — HIGH (ref 0.2–1.2)
BUN SERPL-MCNC: 17 MG/DL — SIGNIFICANT CHANGE UP (ref 7–18)
CALCIUM SERPL-MCNC: 8.6 MG/DL — SIGNIFICANT CHANGE UP (ref 8.4–10.5)
CHLORIDE SERPL-SCNC: 112 MMOL/L — HIGH (ref 96–108)
CO2 SERPL-SCNC: 21 MMOL/L — LOW (ref 22–31)
CREAT SERPL-MCNC: 0.71 MG/DL — SIGNIFICANT CHANGE UP (ref 0.5–1.3)
EGFR: 117 ML/MIN/1.73M2 — SIGNIFICANT CHANGE UP
GLUCOSE SERPL-MCNC: 84 MG/DL — SIGNIFICANT CHANGE UP (ref 70–99)
HCT VFR BLD CALC: 18.3 % — CRITICAL LOW (ref 39–50)
HCT VFR BLD CALC: 18.8 % — CRITICAL LOW (ref 39–50)
HGB BLD-MCNC: 6.6 G/DL — CRITICAL LOW (ref 13–17)
HGB BLD-MCNC: 6.9 G/DL — CRITICAL LOW (ref 13–17)
MAGNESIUM SERPL-MCNC: 1.9 MG/DL — SIGNIFICANT CHANGE UP (ref 1.6–2.6)
MCHC RBC-ENTMCNC: 32 PG — SIGNIFICANT CHANGE UP (ref 27–34)
MCHC RBC-ENTMCNC: 32.7 PG — SIGNIFICANT CHANGE UP (ref 27–34)
MCHC RBC-ENTMCNC: 36.1 GM/DL — HIGH (ref 32–36)
MCHC RBC-ENTMCNC: 36.7 GM/DL — HIGH (ref 32–36)
MCV RBC AUTO: 88.8 FL — SIGNIFICANT CHANGE UP (ref 80–100)
MCV RBC AUTO: 89.1 FL — SIGNIFICANT CHANGE UP (ref 80–100)
NRBC # BLD: 0 /100 WBCS — SIGNIFICANT CHANGE UP (ref 0–0)
NRBC # BLD: 2 /100 WBCS — HIGH (ref 0–0)
PHOSPHATE SERPL-MCNC: 3.6 MG/DL — SIGNIFICANT CHANGE UP (ref 2.5–4.5)
PLATELET # BLD AUTO: 208 K/UL — SIGNIFICANT CHANGE UP (ref 150–400)
PLATELET # BLD AUTO: 220 K/UL — SIGNIFICANT CHANGE UP (ref 150–400)
POTASSIUM SERPL-MCNC: 4.8 MMOL/L — SIGNIFICANT CHANGE UP (ref 3.5–5.3)
POTASSIUM SERPL-SCNC: 4.8 MMOL/L — SIGNIFICANT CHANGE UP (ref 3.5–5.3)
PROT SERPL-MCNC: 6.9 G/DL — SIGNIFICANT CHANGE UP (ref 6–8.3)
RBC # BLD: 2.06 M/UL — LOW (ref 4.2–5.8)
RBC # BLD: 2.11 M/UL — LOW (ref 4.2–5.8)
RBC # FLD: 20.3 % — HIGH (ref 10.3–14.5)
RBC # FLD: 20.8 % — HIGH (ref 10.3–14.5)
SODIUM SERPL-SCNC: 141 MMOL/L — SIGNIFICANT CHANGE UP (ref 135–145)
WBC # BLD: 7.91 K/UL — SIGNIFICANT CHANGE UP (ref 3.8–10.5)
WBC # BLD: 9.04 K/UL — SIGNIFICANT CHANGE UP (ref 3.8–10.5)
WBC # FLD AUTO: 7.91 K/UL — SIGNIFICANT CHANGE UP (ref 3.8–10.5)
WBC # FLD AUTO: 9.04 K/UL — SIGNIFICANT CHANGE UP (ref 3.8–10.5)

## 2022-08-17 PROCEDURE — 36415 COLL VENOUS BLD VENIPUNCTURE: CPT

## 2022-08-17 PROCEDURE — 86901 BLOOD TYPING SEROLOGIC RH(D): CPT

## 2022-08-17 PROCEDURE — 80053 COMPREHEN METABOLIC PANEL: CPT

## 2022-08-17 PROCEDURE — 85025 COMPLETE CBC W/AUTO DIFF WBC: CPT

## 2022-08-17 PROCEDURE — 85045 AUTOMATED RETICULOCYTE COUNT: CPT

## 2022-08-17 PROCEDURE — 83615 LACTATE (LD) (LDH) ENZYME: CPT

## 2022-08-17 PROCEDURE — 99285 EMERGENCY DEPT VISIT HI MDM: CPT

## 2022-08-17 PROCEDURE — 93005 ELECTROCARDIOGRAM TRACING: CPT

## 2022-08-17 PROCEDURE — 36430 TRANSFUSION BLD/BLD COMPNT: CPT

## 2022-08-17 PROCEDURE — 83735 ASSAY OF MAGNESIUM: CPT

## 2022-08-17 PROCEDURE — 85027 COMPLETE CBC AUTOMATED: CPT

## 2022-08-17 PROCEDURE — 71045 X-RAY EXAM CHEST 1 VIEW: CPT

## 2022-08-17 PROCEDURE — 84100 ASSAY OF PHOSPHORUS: CPT

## 2022-08-17 PROCEDURE — 99232 SBSQ HOSP IP/OBS MODERATE 35: CPT

## 2022-08-17 PROCEDURE — 86900 BLOOD TYPING SEROLOGIC ABO: CPT

## 2022-08-17 PROCEDURE — 96374 THER/PROPH/DIAG INJ IV PUSH: CPT

## 2022-08-17 PROCEDURE — 87635 SARS-COV-2 COVID-19 AMP PRB: CPT

## 2022-08-17 PROCEDURE — 86922 COMPATIBILITY TEST ANTIGLOB: CPT

## 2022-08-17 PROCEDURE — 86850 RBC ANTIBODY SCREEN: CPT

## 2022-08-17 PROCEDURE — P9040: CPT

## 2022-08-17 RX ORDER — HYDROMORPHONE HYDROCHLORIDE 2 MG/ML
1 INJECTION INTRAMUSCULAR; INTRAVENOUS; SUBCUTANEOUS EVERY 4 HOURS
Refills: 0 | Status: DISCONTINUED | OUTPATIENT
Start: 2022-08-17 | End: 2022-08-17

## 2022-08-17 RX ORDER — COLCHICINE 0.6 MG
1 TABLET ORAL
Qty: 0 | Refills: 0 | DISCHARGE

## 2022-08-17 RX ORDER — POLYETHYLENE GLYCOL 3350 17 G/17G
17 POWDER, FOR SOLUTION ORAL
Qty: 238 | Refills: 0
Start: 2022-08-17 | End: 2022-08-30

## 2022-08-17 RX ORDER — ALLOPURINOL 300 MG
1 TABLET ORAL
Qty: 30 | Refills: 0
Start: 2022-08-17 | End: 2022-09-15

## 2022-08-17 RX ADMIN — OXYCODONE HYDROCHLORIDE 30 MILLIGRAM(S): 5 TABLET ORAL at 13:35

## 2022-08-17 RX ADMIN — Medication 100 MILLIGRAM(S): at 13:53

## 2022-08-17 RX ADMIN — Medication 100 UNIT(S): at 15:49

## 2022-08-17 RX ADMIN — HYDROMORPHONE HYDROCHLORIDE 2 MILLIGRAM(S): 2 INJECTION INTRAMUSCULAR; INTRAVENOUS; SUBCUTANEOUS at 01:46

## 2022-08-17 RX ADMIN — LIDOCAINE 1 PATCH: 4 CREAM TOPICAL at 14:08

## 2022-08-17 RX ADMIN — HYDROMORPHONE HYDROCHLORIDE 1 MILLIGRAM(S): 2 INJECTION INTRAMUSCULAR; INTRAVENOUS; SUBCUTANEOUS at 15:56

## 2022-08-17 RX ADMIN — HYDROMORPHONE HYDROCHLORIDE 1 MILLIGRAM(S): 2 INJECTION INTRAMUSCULAR; INTRAVENOUS; SUBCUTANEOUS at 14:35

## 2022-08-17 RX ADMIN — HYDROMORPHONE HYDROCHLORIDE 2 MILLIGRAM(S): 2 INJECTION INTRAMUSCULAR; INTRAVENOUS; SUBCUTANEOUS at 01:29

## 2022-08-17 RX ADMIN — PANTOPRAZOLE SODIUM 40 MILLIGRAM(S): 20 TABLET, DELAYED RELEASE ORAL at 06:16

## 2022-08-17 RX ADMIN — HYDROMORPHONE HYDROCHLORIDE 2 MILLIGRAM(S): 2 INJECTION INTRAMUSCULAR; INTRAVENOUS; SUBCUTANEOUS at 05:52

## 2022-08-17 RX ADMIN — OXYCODONE HYDROCHLORIDE 30 MILLIGRAM(S): 5 TABLET ORAL at 09:30

## 2022-08-17 RX ADMIN — HYDROMORPHONE HYDROCHLORIDE 2 MILLIGRAM(S): 2 INJECTION INTRAMUSCULAR; INTRAVENOUS; SUBCUTANEOUS at 06:27

## 2022-08-17 RX ADMIN — HYDROMORPHONE HYDROCHLORIDE 2 MILLIGRAM(S): 2 INJECTION INTRAMUSCULAR; INTRAVENOUS; SUBCUTANEOUS at 10:20

## 2022-08-17 RX ADMIN — OXYCODONE HYDROCHLORIDE 30 MILLIGRAM(S): 5 TABLET ORAL at 14:08

## 2022-08-17 RX ADMIN — LIDOCAINE 1 PATCH: 4 CREAM TOPICAL at 01:26

## 2022-08-17 RX ADMIN — HYDROMORPHONE HYDROCHLORIDE 2 MILLIGRAM(S): 2 INJECTION INTRAMUSCULAR; INTRAVENOUS; SUBCUTANEOUS at 14:08

## 2022-08-17 RX ADMIN — Medication 1 TABLET(S): at 13:53

## 2022-08-17 RX ADMIN — Medication 1 MILLIGRAM(S): at 13:53

## 2022-08-17 NOTE — PROGRESS NOTE ADULT - SUBJECTIVE AND OBJECTIVE BOX
Source of information: TOLU VARGAS, Chart review  Patient language: English  : n/a    HPI:  41 yo M w/ PMHx sickle cell disease, gout and cholecystectomy who presents with knee and back pain x 2 days. Pt states he comes in every month for blood transfusions 2/2 to sickle cell disease. He states his pain is a 10/10 sharp pain that is in most of the joints in his body, specifically jaw, knee, mouth, and back. Patient states he is also dizzy, has a right sided headache, is nauseous, and has worsening lower extremity swelling. Otherwise, denies CP, SOB, Fever, chills, constipation, diarrhea, numbness/tingling, bleeding, visual disturbances, or fevers. Of note, he is up to date on his immunizations.  (15 Aug 2022 04:24)      Dx with sickle cell crisis. Pain consulted for joint pain. Seen by pain management in 6/2022 pt was on oxycodone 30mg PO q6h PRN and dialudid 3mg IV qh PRN. Retic % 17.7 -> 15.8 (8/15) -> 12.9 (8/16) .+ anemia. s/p 2 unit PRBC 8/15 and 8/16. Pt seen and examined at bedside. Pt laying in bed, reports generalized joint pain, greatest over lumbar back, rating score 7/10 and tolerable SCALE USED: (1-10 VNRS). Reports he recently received his PRN pain meds.  Pt describes pain as constant, sharp, non- radiating, alleviated by pain medication, exacerbated by movement and palpation. Reports experiencing monthly crises, denies known triggers. Pt tolerating PO diet. Denies lethargy, chest pain, SOB, nausea, vomiting, constipation. Reports last BM 8/17. Patient stated goal for pain control: to be able to take deep breaths, get out of bed to chair and ambulate with tolerable pain control. Pt ambulating with tolerable pain. Pt is opioid dependent, on oxycodone 30mg PO q4h PRN.    PAST MEDICAL & SURGICAL HISTORY:  Sickle cell anemia      Gout      History of cholecystectomy          FAMILY HISTORY:  Family history of sickle cell trait (Father, Mother)        Social History:  Lives at home (15 Aug 2022 04:24)   [X ] Denies ETOH use, illicit drug use   [X] daily marijuana smoking    Allergies    ceftriaxone (Anaphylaxis)  hydroxyurea (Other)  penicillin (Pruritus)  piperacillin-tazobactam (Urticaria)    MEDICATIONS  (STANDING):  allopurinol 100 milliGRAM(s) Oral daily  folic acid 1 milliGRAM(s) Oral daily  lidocaine   4% Patch 1 Patch Transdermal daily  multivitamin 1 Tablet(s) Oral daily  pantoprazole    Tablet 40 milliGRAM(s) Oral before breakfast  polyethylene glycol 3350 17 Gram(s) Oral daily  senna 2 Tablet(s) Oral at bedtime  sodium chloride 0.45%. 1000 milliLiter(s) (75 mL/Hr) IV Continuous <Continuous>    MEDICATIONS  (PRN):  HYDROmorphone  Injectable 2 milliGRAM(s) IV Push every 4 hours PRN Severe Pain (7 - 10)  oxyCODONE    IR 30 milliGRAM(s) Oral every 4 hours PRN Moderate Pain (4 - 6)      Vital Signs Last 24 Hrs  T(C): 36.7 (17 Aug 2022 04:40), Max: 37.2 (16 Aug 2022 18:48)  T(F): 98.1 (17 Aug 2022 04:40), Max: 99 (16 Aug 2022 18:48)  HR: 72 (17 Aug 2022 04:40) (69 - 85)  BP: 136/86 (17 Aug 2022 04:40) (128/75 - 136/86)  BP(mean): --  RR: 18 (17 Aug 2022 04:40) (16 - 18)  SpO2: 94% (17 Aug 2022 04:40) (94% - 95%)    Parameters below as of 17 Aug 2022 04:40  Patient On (Oxygen Delivery Method): room air      COVID-19 PCR: NotDetec (15 Aug 2022 02:57)  COVID-19 PCR: NotDetec (31 May 2022 02:47)  COVID-19 PCR: NotDetec (17 Mar 2022 06:25)  COVID-19 PCR: NotDetec (10 Mar 2022 19:07)  COVID-19 PCR: NotDetec (07 Mar 2022 07:08)  COVID-19 PCR: NotDetec (28 Feb 2022 11:17)  COVID-19 PCR: NotDetec (25 Feb 2022 00:43)    LABS: Reviewed                          6.9    9.04  )-----------( 220      ( 17 Aug 2022 07:56 )             18.8     08-17    141  |  112<H>  |  17  ----------------------------<  84  4.8   |  21<L>  |  0.71    Ca    8.6      17 Aug 2022 07:56  Phos  3.6     08-17  Mg     1.9     08-17    TPro  6.9  /  Alb  3.1<L>  /  TBili  6.2<H>  /  DBili  x   /  AST  109<H>  /  ALT  49  /  AlkPhos  137<H>  08-17      LIVER FUNCTIONS - ( 17 Aug 2022 07:56 )  Alb: 3.1 g/dL / Pro: 6.9 g/dL / ALK PHOS: 137 U/L / ALT: 49 U/L DA / AST: 109 U/L / GGT: x     Radiology: Reviewed.   < from: Xray Chest 1 View-PORTABLE IMMEDIATE (Xray Chest 1 View-PORTABLE IMMEDIATE .) (08.15.22 @ 04:06) >    ACC: [id number] EXAM:  XR CHEST PORTABLE IMMED 1V                          PROCEDURE DATE:  08/15/2022          INTERPRETATION:  PROCEDURE: AP view of the chest.    CLINICAL INFORMATION: Sickle cell crisis.    COMPARISON: 6/23/2022.    FINDINGS:    Right chest medication port is noted.    Lungs: There are bilateral lung opacities.  Heart: There is cardiomegaly.  Mediastinum: The mediastinum is within normal limits.    IMPRESSION:    Bilateral lung opacities, suspicious for pneumonia.    --- End of Report ---            MEKHI ADHIKARI MD; Attending Radiologist  This document has been electronically signed. Aug 15 2022  7:25AM    < end of copied text >      ORT Score -   Family Hx of substance abuse	Female	      Male  Alcohol 	                                           1                     3  Illegal drugs	                                   2                     3  Rx drugs                                           4 	                  4  Personal Hx of substance abuse		  Alcohol 	                                          3	                  3  Illegal drugs                                     4	                  4  Rx drugs                                            5 	                  5  Age between 16- 45 years	           1                     1  hx preadolescent sexual abuse	   3 	                  0  Psychological disease		  ADD, OCD, bipolar, schizophrenia   2	          2  Depression                                           1 	          1  Total: 1    a score of 3 or lower indicates low risk for opioid abuse		  a score of 4-7 indicates moderate risk for opioid abuse		  a score of 8 or higher indicates high risk for opioid abuse    REVIEW OF SYSTEMS:  CONSTITUTIONAL: No fever or fatigue  HEENT:  No difficulty hearing, no change in vision  NECK: No pain or stiffness  RESPIRATORY: No cough, wheezing, chills or hemoptysis; No shortness of breath  CARDIOVASCULAR: No chest pain, palpitations, dizziness   GASTROINTESTINAL: No loss of appetite, decreased PO intake. No abdominal or epigastric pain. No nausea, vomiting; No diarrhea or constipation.   GENITOURINARY: No dysuria, frequency, hematuria, retention or incontinence  MUSCULOSKELETAL: + generalized joint pain, greatest over lower back + chronic b/l LE swelling; no upper or lower motor strength weakness, no saddle anesthesia, bowel/bladder incontinence, no falls   NEURO: No headaches, no numbness/tingling b/l LE, No weakness    PHYSICAL EXAM:  GENERAL:  Alert & Oriented X4, cooperative, NAD, Good concentration. Speech is clear.   RESPIRATORY: Respirations even and unlabored. Clear to auscultation bilaterally; No rales, rhonchi, wheezing, or rubs  CARDIOVASCULAR: Normal S1/S2, regular rate and rhythm; No murmurs, rubs, or gallops. No JVD. + right CW mediport  GASTROINTESTINAL:  Soft, Nontender, Nondistended; Bowel sounds present  PERIPHERAL VASCULAR:  Extremities warm with moderate b/l LE edema. 2+ Peripheral Pulses, No cyanosis, No calf tenderness  MUSCULOSKELETAL: Motor Strength 5/5 B/L upper and lower extremities; moves all extremities equally against gravity; ROM intact; + generalized joint tenderness.   SKIN: +icterus; Warm, dry, intact. No rashes, lesions, scars or wounds.     Risk factors associated with adverse outcomes related to opioid treatment  [ ]  Concurrent benzodiazepine use  [ ]  History/ Active substance use or alcohol use disorder  [ ] Psychiatric co-morbidity  [ ] Sleep apnea  [ ] COPD  [ ] BMI> 35  [ X] Liver dysfunction  [ X] Renal dysfunction  [ ] CHF  [ x] Smoker- marijuana   [ ]  Age > 60 years    [X ]  [place]  Reviewed and Copied to Chart. See below.    Plan of care and goal oriented pain management treatment options were discussed with patient and /or primary care giver; all questions and concerns were addressed and care was aligned with patient's wishes.    Educated patient on goal oriented pain management treatment options     08-17-22 @ 11:11

## 2022-08-17 NOTE — PROGRESS NOTE ADULT - PROBLEM SELECTOR PLAN 2
Cr. 0.82 (Baseline 0.8-1.0)  Avoid NSAIDs, can worsen ATN  Resolved   cw fluids, 1L 1/2NS at 75mL/hr
panculture  antibiotics  CT chest without contrast  monitor temp and WBC.
panculture  antibiotics  CT chest without contrast  monitor temp and WBC.
Cr. 0.82 (Baseline 0.8-1.0)  Avoid NSAIDs, can worsen ATN  Resolved   cw fluids, 1L 1/2NS at 75mL/hr

## 2022-08-17 NOTE — PROGRESS NOTE ADULT - PROBLEM SELECTOR PLAN 1
Pt with acute generalized joint pain which is somatic in nature due to SCC. Retic % 17.7 -> 15.8 (8/15) -> 12.9 (8/16) .+ anemia. s/p 2 unit PRBC 8/15 and 8/16 + MATA (improved) hx gout. Pt is opioid dependent.   IVF per primary team.   Opioid pain recommendations   - Continue oxycodone 30 mg PO q 4 hours PRN moderate pain. (home dose) Monitor for sedation/ respiratory depression.   - Decrease dilaudid 1mg IV q4h PRN severe pain. Monitor for sedation/ respiratory depression.   Non-opioid pain recommendations   - Avoid NSAIDs- anemia, MATA  - Avoid Acetaminophen, transaminitis   - Continue allopurinol 100mg PO daily  - Colchicine held per primary team d/t MATA  Bowel Regimen  - Miralax 17G PO daily  - Continue Senna 2 tablets at bedtime for constipation  Mild pain   - Non-pharmacological pain treatment recommendations  - Warm/ Cool packs PRN   - Repositioning, imagery, relaxation, distraction.  - Physical therapy OOB if no contraindications   Recommendations discussed with primary team and RN.

## 2022-08-17 NOTE — PROGRESS NOTE ADULT - SUBJECTIVE AND OBJECTIVE BOX
Patient is a 42y old  Male who presents with a chief complaint of sickle cell crisis (17 Aug 2022 07:54)  Awake, alert, comfortable in bed in NAD    INTERVAL HPI/OVERNIGHT EVENTS:      VITAL SIGNS:  T(F): 98.1 (08-17-22 @ 04:40)  HR: 72 (08-17-22 @ 04:40)  BP: 136/86 (08-17-22 @ 04:40)  RR: 18 (08-17-22 @ 04:40)  SpO2: 94% (08-17-22 @ 04:40)  Wt(kg): --  I&O's Detail          REVIEW OF SYSTEMS:    CONSTITUTIONAL:  No fevers, chills, sweats    HEENT:  Eyes:  No diplopia or blurred vision. ENT:  No earache, sore throat or runny nose.    CARDIOVASCULAR:  No pressure, squeezing, tightness, or heaviness about the chest; no palpitations.    RESPIRATORY:  Per HPI    GASTROINTESTINAL:  No abdominal pain, nausea, vomiting or diarrhea.    GENITOURINARY:  No dysuria, frequency or urgency.    NEUROLOGIC:  No paresthesias, fasciculations, seizures or weakness.    PSYCHIATRIC:  No disorder of thought or mood.      PHYSICAL EXAM:    Constitutional: Well developed and nourished  Eyes:Perrla  ENMT: normal  Neck:supple  Respiratory: good air entry  Cardiovascular: S1 S2 regular  Gastrointestinal: Soft, Non tender  Extremities: No edema  Vascular:normal  Neurological:Awake, alert,Ox3  Musculoskeletal:Normal      MEDICATIONS  (STANDING):  allopurinol 100 milliGRAM(s) Oral daily  folic acid 1 milliGRAM(s) Oral daily  lidocaine   4% Patch 1 Patch Transdermal daily  multivitamin 1 Tablet(s) Oral daily  pantoprazole    Tablet 40 milliGRAM(s) Oral before breakfast  polyethylene glycol 3350 17 Gram(s) Oral daily  senna 2 Tablet(s) Oral at bedtime  sodium chloride 0.45%. 1000 milliLiter(s) (75 mL/Hr) IV Continuous <Continuous>    MEDICATIONS  (PRN):  HYDROmorphone  Injectable 2 milliGRAM(s) IV Push every 4 hours PRN Severe Pain (7 - 10)  oxyCODONE    IR 30 milliGRAM(s) Oral every 4 hours PRN Moderate Pain (4 - 6)      Allergies    ceftriaxone (Anaphylaxis)  hydroxyurea (Other)  penicillin (Pruritus)  piperacillin-tazobactam (Urticaria)    Intolerances        LABS:                        6.9    9.04  )-----------( 220      ( 17 Aug 2022 07:56 )             18.8     08-17    141  |  112<H>  |  17  ----------------------------<  84  4.8   |  21<L>  |  0.71    Ca    8.6      17 Aug 2022 07:56  Phos  3.6     08-17  Mg     1.9     08-17    TPro  6.9  /  Alb  3.1<L>  /  TBili  6.2<H>  /  DBili  x   /  AST  109<H>  /  ALT  49  /  AlkPhos  137<H>  08-17              CAPILLARY BLOOD GLUCOSE            RADIOLOGY & ADDITIONAL TESTS:  < from: US Abdomen Complete (US Abdomen Complete .) (03.02.22 @ 10:01) >  IMPRESSION:  Status post cholecystectomy; otherwise, unremarkable right upper quadrant   abdominal ultrasound.      < end of copied text >    CXR:  < from: Xray Chest 1 View-PORTABLE IMMEDIATE (Xray Chest 1 View-PORTABLE IMMEDIATE .) (08.15.22 @ 04:06) >    IMPRESSION:    Bilateral lung opacities, suspicious for pneumonia.    < end of copied text >    Ct scan chest:    ekg;    echo:

## 2022-08-17 NOTE — DISCHARGE NOTE NURSING/CASE MANAGEMENT/SOCIAL WORK - NSDCPEFALRISK_GEN_ALL_CORE
For information on Fall & Injury Prevention, visit: [email or web address] OR  [email or web address] OR  https://www.cdc.gov/steadi/patient.html

## 2022-08-17 NOTE — PROGRESS NOTE ADULT - PROVIDER SPECIALTY LIST ADULT
Internal Medicine
Internal Medicine
Heme/Onc
Internal Medicine
Pain Medicine
Internal Medicine
Pulmonology
Pain Medicine
Pulmonology

## 2022-08-17 NOTE — PROGRESS NOTE ADULT - PROBLEM SELECTOR PROBLEM 4
MATA (acute kidney injury)
Prophylactic measure
MATA (acute kidney injury)
Gout
Prophylactic measure
Gout

## 2022-08-17 NOTE — DISCHARGE NOTE NURSING/CASE MANAGEMENT/SOCIAL WORK - PATIENT PORTAL LINK FT
You can access the FollowMyHealth Patient Portal offered by [facility] by registering at the following website: [email or web address]. By joining [facility]’s FollowMyHealth portal, you will also be able to view your health information using other applications (apps) compatible with our system.

## 2022-08-17 NOTE — PROGRESS NOTE ADULT - SUBJECTIVE AND OBJECTIVE BOX
PGY-1 Progress Note discussed with attending    PAGER #: [[phone]] TILL 5:00 PM  PLEASE CONTACT ON CALL TEAM:  - On Call Team (Please refer to Rubi) FROM 5:00 PM - 8:30PM  - Nightfloat Team FROM 8:30 -7:30 AM    CHIEF COMPLAINT & BRIEF HOSPITAL COURSE:    INTERVAL HPI/OVERNIGHT EVENTS:   Patient seen and examined at bedside. No acute events overnight.    ROS  Constitutional: (-)fever, (-)night sweats, (-)chills, (-)cold intolerance, (-)fatigue  Eyes: (-)change in vision, (-)loss of vision, (-)blurred vision  Ears: (-)difficulty hearing, (-)hearing loss  Nose: (-)nasal discharge  Mouth/Throat/Voice: (-)lip sores, (-)dysphagia, (-)odynophagia  Neck: (-)neck pain, (-)neck stiffness, (-)neck lumps, (-)neck swelling  Respiratory: (-)dyspnea, (-)cough, (-)cough productive of sputum, (-)hemoptysis, (-)wheezing  Cardiovascular: (-)chest pain, (-)palpitations, (-)dyspnea at rest, (-)dyspnea with activity, (-)orthopnea  Gastrointestinal: (-)abdominal pain, (-)rectal pain, (-)nausea, (-)vomiting  Urinary: (-)dysuria, (-)hematuria, (-)urinary hesitancy, (-)difficulty initiating urine stream  Genital/Reproductive: (-)change in libido, (-)problems with sexual function, (-)dyspareunia, (-)difficulty achieving erection, (-)difficulty maintaining erection  Dermatologic/Integumentary: (-)change in hair texture, (-)change in skin texture, (-)change in nail appearance, (-)dry hair  Musculoskeletal: (-)muscle pain, (-)back pain, (-)tender points, (-)muscle cramps  Neurological: (-)headaches, (-)vertigo, (-)lightheadedness, (-)fainting  Psychiatric: (-)change in mood, (-)depression, (-)sadness interfering with function, (-)anxiety, (-)nervousness  Hematologic/Lymphatic: (-)easy bruising, (-)difficulty stopping blood flow    allopurinol 100 milliGRAM(s) Oral daily  folic acid 1 milliGRAM(s) Oral daily  HYDROmorphone  Injectable 2 milliGRAM(s) IV Push every 4 hours PRN  lidocaine   4% Patch 1 Patch Transdermal daily  multivitamin 1 Tablet(s) Oral daily  oxyCODONE    IR 30 milliGRAM(s) Oral every 4 hours PRN  pantoprazole    Tablet 40 milliGRAM(s) Oral before breakfast  polyethylene glycol 3350 17 Gram(s) Oral daily  senna 2 Tablet(s) Oral at bedtime  sodium chloride 0.45%. 1000 milliLiter(s) IV Continuous <Continuous>      Vital Signs Last 24 Hrs  T(C): 36.7 (17 Aug 2022 04:40), Max: 37.2 (16 Aug 2022 18:48)  T(F): 98.1 (17 Aug 2022 04:40), Max: 99 (16 Aug 2022 18:48)  HR: 72 (17 Aug 2022 04:40) (69 - 85)  BP: 136/86 (17 Aug 2022 04:40) (128/75 - 136/86)  BP(mean): --  RR: 18 (17 Aug 2022 04:40) (16 - 18)  SpO2: 94% (17 Aug 2022 04:40) (94% - 95%)    Parameters below as of 17 Aug 2022 04:40  Patient On (Oxygen Delivery Method): room air        PHYSICAL EXAMINATION:  GENERAL: NAD  HEAD:  Atraumatic, Normocephalic  EYES:  Conjunctiva and sclera clear, pupils are equal, round, and reactive to light and accommodation.  NECK: Supple, No JVD, trachea is midline, no evidence of thyroid enlargement, no lymphadenopathy or tenderness.  CHEST/LUNG: Clear to auscultation; No rales, rhonchi, wheezing, or rubs  HEART: Regular rate and rhythm; No murmurs, rubs, or gallops  ABDOMEN: Soft, Nontender, Nondistended; Bowel sounds present  NERVOUS SYSTEM:  Alert & Oriented X3; No focal sensory or motor deficits are noted; Recent and remote memory is intact; Appropriate mood and affect.  EXTREMITIES:  2+ Peripheral Pulses, No clubbing, cyanosis, or edema  SKIN: Warm, dry, and well perfused; Good turgor; No lesions, nodules or rashes are noted.                          6.6    7.91  )-----------( 208      ( 17 Aug 2022 00:37 )             18.3     08-16    140  |  113<H>  |  18  ----------------------------<  93  4.8   |  22  |  0.82    Ca    8.6      16 Aug 2022 10:53  Phos  3.8     08-16  Mg     2.1     08-16    TPro  6.9  /  Alb  3.1<L>  /  TBili  5.9<H>  /  DBili  x   /  AST  98<H>  /  ALT  48  /  AlkPhos  142<H>  08-16    LIVER FUNCTIONS - ( 16 Aug 2022 10:53 )  Alb: 3.1 g/dL / Pro: 6.9 g/dL / ALK PHOS: 142 U/L / ALT: 48 U/L DA / AST: 98 U/L / GGT: x                   CAPILLARY BLOOD GLUCOSE      RADIOLOGY & ADDITIONAL TESTS:           PGY-1 Progress Note discussed with attending    PAGER #: [[phone]] TILL 5:00 PM  PLEASE CONTACT ON CALL TEAM:  - On Call Team (Please refer to Rubi) FROM 5:00 PM - 8:30PM  - Nightfloat Team FROM 8:30 -7:30 AM    CHIEF COMPLAINT & BRIEF HOSPITAL COURSE:    INTERVAL HPI/OVERNIGHT EVENTS:   Patient seen and examined at bedside. No acute events overnight. Patient mentioned feeling better and is   in less pain today. Denies seeing blood in th urine. Denies chest pain, SOB or palpitations.  Denies nausea or vomiting. Denies diarrhea. He does not have any complains today.     ROS  Constitutional: (-)fever, (-)night sweats, (-)chills, (-)cold intolerance, (-)fatigue  Eyes: (-)change in vision, (-)loss of vision, (-)blurred vision  Ears: (-)difficulty hearing, (-)hearing loss  Nose: (-)nasal discharge  Mouth/Throat/Voice: (-)lip sores, (-)dysphagia, (-)odynophagia  Neck: (-)neck pain, (-)neck stiffness, (-)neck lumps, (-)neck swelling  Respiratory: (-)dyspnea, (-)cough, (-)cough productive of sputum, (-)hemoptysis, (-)wheezing  Cardiovascular: (-)chest pain, (-)palpitations, (-)dyspnea at rest, (-)dyspnea with activity, (-)orthopnea  Gastrointestinal: (-)abdominal pain, (-)rectal pain, (-)nausea, (-)vomiting  Urinary: (-)dysuria, (-)hematuria, (-)urinary hesitancy, (-)difficulty initiating urine stream  Genital/Reproductive: (-)change in libido, (-)problems with sexual function, (-)dyspareunia, (-)difficulty achieving erection, (-)difficulty maintaining erection  Dermatologic/Integumentary: (-)change in hair texture, (-)change in skin texture, (-)change in nail appearance, (-)dry hair  Musculoskeletal: (+)muscle pain, (+)back pain, (+)tender points, (-)muscle cramps. (+)knee pain   Neurological: (-)headaches, (-)vertigo, (-)lightheadedness, (-)fainting  Psychiatric: (-)change in mood, (-)depression, (-)sadness interfering with function, (-)anxiety, (-)nervousness  Hematologic/Lymphatic: (-)easy bruising, (-)difficulty stopping blood flow      allopurinol 100 milliGRAM(s) Oral daily  folic acid 1 milliGRAM(s) Oral daily  HYDROmorphone  Injectable 2 milliGRAM(s) IV Push every 4 hours PRN  lidocaine   4% Patch 1 Patch Transdermal daily  multivitamin 1 Tablet(s) Oral daily  oxyCODONE    IR 30 milliGRAM(s) Oral every 4 hours PRN  pantoprazole    Tablet 40 milliGRAM(s) Oral before breakfast  polyethylene glycol 3350 17 Gram(s) Oral daily  senna 2 Tablet(s) Oral at bedtime  sodium chloride 0.45%. 1000 milliLiter(s) IV Continuous <Continuous>      Vital Signs Last 24 Hrs  T(C): 36.7 (17 Aug 2022 04:40), Max: 37.2 (16 Aug 2022 18:48)  T(F): 98.1 (17 Aug 2022 04:40), Max: 99 (16 Aug 2022 18:48)  HR: 72 (17 Aug 2022 04:40) (69 - 85)  BP: 136/86 (17 Aug 2022 04:40) (128/75 - 136/86)  BP(mean): --  RR: 18 (17 Aug 2022 04:40) (16 - 18)  SpO2: 94% (17 Aug 2022 04:40) (94% - 95%)    Parameters below as of 17 Aug 2022 04:40  Patient On (Oxygen Delivery Method): room air        PHYSICAL EXAMINATION:  GENERAL: NAD  HEAD:  Atraumatic, Normocephalic  EYES:  Conjunctiva clear, yellow sclera, pupils are equal, round, and reactive to light and accommodation.  NECK: Supple, No JVD, trachea is midline, no evidence of thyroid enlargement, no lymphadenopathy or tenderness.  CHEST/LUNG: Clear to auscultation; No rales, rhonchi, wheezing, or rubs, port placed on right chest  HEART: Regular rate and rhythm; No murmurs, rubs, or gallops  ABDOMEN: Soft, Nontender, Nondistended; Bowel sounds present  NERVOUS SYSTEM:  Alert & Oriented X3; No focal sensory or motor deficits are noted; Recent and remote memory is intact; Appropriate mood and affect.  EXTREMITIES:  2+ Peripheral Pulses, No clubbing, no cyanosis, still non pitting edema noted on lower legs, less tenderness to palpation on upper and lower extremities, less tenderness to palpation on paravertebral muscle on the lumbar area.   SKIN: Warm, dry, and well perfused; Good turgor; No lesions, nodules or rashes are noted                          6.6    7.91  )-----------( 208      ( 17 Aug 2022 00:37 )             18.3     08-16    140  |  113<H>  |  18  ----------------------------<  93  4.8   |  22  |  0.82    Ca    8.6      16 Aug 2022 10:53  Phos  3.8     08-16  Mg     2.1     08-16    TPro  6.9  /  Alb  3.1<L>  /  TBili  5.9<H>  /  DBili  x   /  AST  98<H>  /  ALT  48  /  AlkPhos  142<H>  08-16    LIVER FUNCTIONS - ( 16 Aug 2022 10:53 )  Alb: 3.1 g/dL / Pro: 6.9 g/dL / ALK PHOS: 142 U/L / ALT: 48 U/L DA / AST: 98 U/L / GGT: x                   CAPILLARY BLOOD GLUCOSE      RADIOLOGY & ADDITIONAL TESTS:  < from: Xray Chest 1 View-PORTABLE IMMEDIATE (Xray Chest 1 View-PORTABLE IMMEDIATE .) (08.15.22 @ 04:06) >    IMPRESSION:    Bilateral lung opacities, suspicious for pneumonia.    < end of copied text >

## 2022-08-17 NOTE — PROGRESS NOTE ADULT - PROBLEM SELECTOR PROBLEM 1
Anemia, sickle cell with crisis
Acute joint pain
Anemia, sickle cell with crisis
Anemia, sickle cell with crisis
Acute joint pain
Anemia, sickle cell with crisis

## 2022-08-17 NOTE — PROGRESS NOTE ADULT - SUBJECTIVE AND OBJECTIVE BOX
Patient is a 42y old  Male who presents with a chief complaint of sickle cell crisis (16 Aug 2022 15:51)      pt seen in icu [  ], reg med floor [   ], bed [  ], chair at bedside [   ], a+o x3 [  ], lethargic [  ],  nad [  ]    daniel [  ], ngt [  ], peg [  ], et tube [  ], cent line [  ], picc line [  ]        Allergies    ceftriaxone (Anaphylaxis)  hydroxyurea (Other)  penicillin (Pruritus)  piperacillin-tazobactam (Urticaria)        Vitals    T(F): 98.1 (08-17-22 @ 04:40), Max: 99 (08-16-22 @ 18:48)  HR: 72 (08-17-22 @ 04:40) (69 - 85)  BP: 136/86 (08-17-22 @ 04:40) (128/75 - 136/86)  RR: 18 (08-17-22 @ 04:40) (16 - 18)  SpO2: 94% (08-17-22 @ 04:40) (94% - 95%)  Wt(kg): --  CAPILLARY BLOOD GLUCOSE          Labs                          6.6    7.91  )-----------( 208      ( 17 Aug 2022 00:37 )             18.3       08-16    140  |  113<H>  |  18  ----------------------------<  93  4.8   |  22  |  0.82    Ca    8.6      16 Aug 2022 10:53  Phos  3.8     08-16  Mg     2.1     08-16    TPro  6.9  /  Alb  3.1<L>  /  TBili  5.9<H>  /  DBili  x   /  AST  98<H>  /  ALT  48  /  AlkPhos  142<H>  08-16            .Blood Blood-Peripheral  06-23 @ 06:09   No Growth Final  --  --      .Blood Blood  06-01 @ 18:46   No Growth Final  --  --          Radiology Results      Meds    MEDICATIONS  (STANDING):  allopurinol 100 milliGRAM(s) Oral daily  folic acid 1 milliGRAM(s) Oral daily  lidocaine   4% Patch 1 Patch Transdermal daily  multivitamin 1 Tablet(s) Oral daily  pantoprazole    Tablet 40 milliGRAM(s) Oral before breakfast  polyethylene glycol 3350 17 Gram(s) Oral daily  senna 2 Tablet(s) Oral at bedtime  sodium chloride 0.45%. 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  mata,   atelectasis,   h/o sickle cell disease,   gout   cholecystectomy      PLAN    s/p 1 unit prbc,   f/u rept cbc   if hgb < 6 trans 1 more unit prbc   heme onc f/u noted  cont pain control,   pain mgmt cons noted  Opioid pain recommendations   - Continue oxycodone 30 mg PO q 4 hours PRN moderate pain. (home dose) Monitor for sedation/ respiratory depression.   - Continue dilaudid 2mg IV q4h PRN severe pain. Monitor for sedation/ respiratory depression.   Non-opioid pain recommendations   - Avoid NSAIDs- anemia, MATA  - Avoid Acetaminophen, transaminitis   - Continue allopurinol 100mg PO daily  - Colchicine held per primary team d/t MATA  Bowel Regimen  - Miralax 17G PO daily  - Continue Senna 2 tablets at bedtime for constipation  pulm f/u   cont incentive spirometer,   cont current meds  d/c pending cbc or after next unit prbc if needed          Patient is a 42y old  Male who presents with a chief complaint of sickle cell crisis (16 Aug 2022 15:51)      pt seen in icu [  ], reg med floor [ x  ], bed [ x ], chair at bedside [   ], a+o x3 [ x ], lethargic [  ],  nad [ x ]      Allergies    ceftriaxone (Anaphylaxis)  hydroxyurea (Other)  penicillin (Pruritus)  piperacillin-tazobactam (Urticaria)        Vitals    T(F): 98.1 (08-17-22 @ 04:40), Max: 99 (08-16-22 @ 18:48)  HR: 72 (08-17-22 @ 04:40) (69 - 85)  BP: 136/86 (08-17-22 @ 04:40) (128/75 - 136/86)  RR: 18 (08-17-22 @ 04:40) (16 - 18)  SpO2: 94% (08-17-22 @ 04:40) (94% - 95%)  Wt(kg): --  CAPILLARY BLOOD GLUCOSE          Labs                          6.6    7.91  )-----------( 208      ( 17 Aug 2022 00:37 )             18.3       08-16    140  |  113<H>  |  18  ----------------------------<  93  4.8   |  22  |  0.82    Ca    8.6      16 Aug 2022 10:53  Phos  3.8     08-16  Mg     2.1     08-16    TPro  6.9  /  Alb  3.1<L>  /  TBili  5.9<H>  /  DBili  x   /  AST  98<H>  /  ALT  48  /  AlkPhos  142<H>  08-16            .Blood Blood-Peripheral  06-23 @ 06:09   No Growth Final  --  --      .Blood Blood  06-01 @ 18:46   No Growth Final  --  --          Radiology Results      Meds    MEDICATIONS  (STANDING):  allopurinol 100 milliGRAM(s) Oral daily  folic acid 1 milliGRAM(s) Oral daily  lidocaine   4% Patch 1 Patch Transdermal daily  multivitamin 1 Tablet(s) Oral daily  pantoprazole    Tablet 40 milliGRAM(s) Oral before breakfast  polyethylene glycol 3350 17 Gram(s) Oral daily  senna 2 Tablet(s) Oral at bedtime  sodium chloride 0.45%. 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  mata,   atelectasis,   h/o sickle cell disease,   gout   cholecystectomy      PLAN    s/p 2 unit prbc,   f/u this am cbc   heme onc f/u noted  cont pain control,   pain mgmt cons noted  Opioid pain recommendations   - Continue oxycodone 30 mg PO q 4 hours PRN moderate pain. (home dose) Monitor for sedation/ respiratory depression.   - Continue dilaudid 2mg IV q4h PRN severe pain. Monitor for sedation/ respiratory depression.   Non-opioid pain recommendations   - Avoid NSAIDs- anemia, MATA  - Avoid Acetaminophen, transaminitis   - Continue allopurinol 100mg PO daily  - Colchicine held per primary team d/t MATA  Bowel Regimen  - Miralax 17G PO daily  - Continue Senna 2 tablets at bedtime for constipation  pulm f/u   cont incentive spirometer,   cont current meds  pt stable for d/c

## 2022-08-17 NOTE — PROGRESS NOTE ADULT - ASSESSMENT
Confidential Drug Utilization Report  Search Terms: macey gonzales, 1980Search Date: 08/15/2022 09:34:21 AM  The Drug Utilization Report below displays all of the controlled substance prescriptions, if any, that your patient has filled in the last twelve months. The information displayed on this report is compiled from pharmacy submissions to the Department, and accurately reflects the information as submitted by the pharmacies.    This report was requested by: Lina Cintron | Reference #: [id number]    You have not added a RAFAELA number. Keeping your RAFAELA number(s) up to date on the My RAFAELA # page will enable the separation of your prescriptions from others in the search results.    Others' Prescriptions  Patient Name: Macey GonzalesBirth Date: 1980  Address: [address] [place] [id number]: Male  Rx Written	Rx Dispensed	Drug	Quantity	Days Supply	Prescriber Name	Prescriber Rafaela #	Payment Method	Dispenser  08/11/2022	08/12/2022	oxycodone hcl (ir) 30 mg tab	180	30	Jordan, Shirley SOLITARIO	[id number]	Insurance	[facility]   07/14/2022	07/15/2022	oxycodone hcl (ir) 30 mg tab	180	30	Jordan, Shirley SOLITARIO	[id number]	Insurance	[facility]   06/16/2022	06/16/2022	oxycodone hcl (ir) 30 mg tab	180	30	Jordan, Shirley SOLITARIO	[id number]	Insurance	[facility]   05/19/2022	05/20/2022	oxycodone hcl (ir) 30 mg tab	180	30	Jordan, Shirley SOLITARIO	[id number]	Insurance	[facility]   04/21/2022	04/22/2022	oxycodone hcl (ir) 30 mg tab	180	30	Jordan, Shirley SOLITARIO	[id number]	Insurance	[facility]   03/24/2022	03/25/2022	oxycodone hcl (ir) 30 mg tab	180	30	Jordan, Shirley SOLITARIO	[id number]	Insurance	[facility]   02/24/2022	02/24/2022	oxycodone hcl (ir) 30 mg tab	180	30	Jordan, Shirley SOLITARIO	[id number]	Insurance	[facility]   12/30/2021	12/30/2021	oxycodone hcl (ir) 30 mg tab	180	30	Darrion Barron	[id number]	Insurance	[facility]   12/02/2021	12/02/2021	oxycodone hcl (ir) 30 mg tab	180	30	Jordan, Shirley SOLITARIO	[id number]	Insurance	[facility]   11/04/2021	11/05/2021	oxycodone hcl 30 mg tablet	180	30	Jordan, Shirley SOLITARIO	[id number]	Insurance	[facility]   10/07/2021	10/07/2021	oxycodone hcl 30 mg tablet	180	30	Jordan, Shirley SOLITARIO	[id number]	Insurance	[facility]   * - Drugs marked with an asterisk are compound drugs. If the compound drug is made up of more than one controlled substance, then each controlled substance will be a separate row in the table.
TOLU VARGAS is a 42y Male who presents with a chief complaint of sickle cell.    Sickle Cell Anemia  - Patient with baseline hemoglobin in the 5-6 range. Baseline bilirubin is in the 6 range from chronic ongoing sickle cell hemolysis.  - Monitor CBC. Transfusion only if symptomatic. Last hemoglobin 6.2 is within his baseline.   - IVF and IV opiate pain control as needed.  - Continue folic acid.   - Monitor for signs of infection.   - Pain management consultation.    Acute Kidney Injury  - Patient with creatinine at 1.32 on admission. Baseline is around 0.9 to 1.0.  - Resolved after IVF.  - Continue to monitor kidney functions.    Will continue to follow.    Bala Pan MD  Hematology/Oncology  O: [phone]
43 yo M w/ PMHx sickle cell disease, gout and cholecystectomy who presents with knee and back pain 2/2 sickle cell crisis. Admitted to medicine for sickle cell crisis management.
Confidential Drug Utilization Report  Search Terms: macey gonzales, 1980Search Date: 08/15/2022 09:34:21 AM  The Drug Utilization Report below displays all of the controlled substance prescriptions, if any, that your patient has filled in the last twelve months. The information displayed on this report is compiled from pharmacy submissions to the Department, and accurately reflects the information as submitted by the pharmacies.    This report was requested by: Lina Cintron | Reference #: [id number]    You have not added a RAFAELA number. Keeping your RAFAELA number(s) up to date on the My RAFAELA # page will enable the separation of your prescriptions from others in the search results.    Others' Prescriptions  Patient Name: Macey GonzalesBirth Date: 1980  Address: [address] [place] [id number]: Male  Rx Written	Rx Dispensed	Drug	Quantity	Days Supply	Prescriber Name	Prescriber Rafaela #	Payment Method	Dispenser  08/11/2022	08/12/2022	oxycodone hcl (ir) 30 mg tab	180	30	Jordan, Shirley SOLITARIO	[id number]	Insurance	[facility]   07/14/2022	07/15/2022	oxycodone hcl (ir) 30 mg tab	180	30	Jordan, Shirley SOLITARIO	[id number]	Insurance	[facility]   06/16/2022	06/16/2022	oxycodone hcl (ir) 30 mg tab	180	30	Jordan, Shirley SOLITARIO	[id number]	Insurance	[facility]   05/19/2022	05/20/2022	oxycodone hcl (ir) 30 mg tab	180	30	Jordan, Shirley SOLITARIO	[id number]	Insurance	[facility]   04/21/2022	04/22/2022	oxycodone hcl (ir) 30 mg tab	180	30	Jordan, Shirley SOLITARIO	[id number]	Insurance	[facility]   03/24/2022	03/25/2022	oxycodone hcl (ir) 30 mg tab	180	30	Jordan, Shirley SOLITARIO	[id number]	Insurance	[facility]   02/24/2022	02/24/2022	oxycodone hcl (ir) 30 mg tab	180	30	Jordan, Shirley SOLITARIO	[id number]	Insurance	[facility]   12/30/2021	12/30/2021	oxycodone hcl (ir) 30 mg tab	180	30	Darrion Barron	[id number]	Insurance	[facility]   12/02/2021	12/02/2021	oxycodone hcl (ir) 30 mg tab	180	30	Jordan, Shirley SOLITARIO	[id number]	Insurance	[facility]   11/04/2021	11/05/2021	oxycodone hcl 30 mg tablet	180	30	Jordan, Shirley SOLITARIO	[id number]	Insurance	[facility]   10/07/2021	10/07/2021	oxycodone hcl 30 mg tablet	180	30	Jordan, Shirley SOLITARIO	[id number]	Insurance	[facility]   * - Drugs marked with an asterisk are compound drugs. If the compound drug is made up of more than one controlled substance, then each controlled substance will be a separate row in the table.
43 yo M w/ PMHx sickle cell disease, gout and cholecystectomy who presents with knee and back pain 2/2 sickle cell crisis. Admitted to medicine for sickle cell crisis management.

## 2022-08-17 NOTE — PROGRESS NOTE ADULT - REASON FOR ADMISSION
sickle cell crisis

## 2022-08-17 NOTE — PROGRESS NOTE ADULT - PROBLEM SELECTOR PLAN 1
h/o sickle cell crisis, not on hydroxyurea due to h/o priapism  multiple history of crisis, last crisis 1.5 months ago  Hbg 4.8 - baseline around 6, currently symptomatic   Bilirubin 6.6  CXR with mild bilateral infiltrates, no signs of sepsis; will hold ABx as less concern for Acute chest syndrome   Lower limbs swollen but no tenderness; less concern for dactylitis at this time   s/p 1u PRBC; goal>6g/dl; 1g magnesium given  f/u CXR, lactate, reticulocyte count, px mngmt   cw oxycodone 30 q4 PRN, dilaudid 2 mg q4 PRN  cw bowel regimen   cw folic acid  cw incentive spirometry  Heme-Onc Dr. Jordan consulted  Hb 5.7 now  Hem-onc consulted  PRBC 1 unit   Continue IVF  F/U CBC h/o sickle cell crisis, not on hydroxyurea due to h/o priapism  multiple history of crisis, last crisis 1.5 months ago  Hbg 4.8 - baseline around 6, currently symptomatic   Bilirubin 6.6  CXR with mild bilateral infiltrates, no signs of sepsis; will hold ABx as less concern for Acute chest syndrome   Lower limbs swollen but no tenderness; less concern for dactylitis at this time   s/p 1u PRBC; goal>6g/dl; 1g magnesium given  f/u CXR, lactate, reticulocyte count, px mngmt   cw oxycodone 30 q4 PRN, dilaudid 2 mg q4 PRN  cw bowel regimen   cw folic acid  cw incentive spirometry  Heme-Onc Dr. Jordan consulted  Hb 5.7 now  Hem-onc consulted  Completed PRBC 1 unit on 08/16/2022  Continue IVF  F/U CBC  Plan for D/C

## 2022-08-17 NOTE — PROGRESS NOTE ADULT - PROBLEM SELECTOR PROBLEM 2
MATA (acute kidney injury)
MATA (acute kidney injury)
Opioid dependence
Pneumonia
Pneumonia
Opioid dependence

## 2022-08-17 NOTE — PROGRESS NOTE ADULT - PROBLEM SELECTOR PLAN 4
check uric acid  pain control.
DVT- holding due to severe crisis; holding SCDs due to pain   GI - none
check uric acid  pain control.
DVT- holding due to severe crisis; holding SCDs due to pain   GI - none

## 2022-08-17 NOTE — PROGRESS NOTE ADULT - PROBLEM SELECTOR PLAN 3
h/o gout, on allopurinol 100 and colchicine 0.6  C/w allopurinol, holding colchicine due to MATA  Consider restarting colchicine
LFTs  GI eval  Abdominal USG
h/o gout, on allopurinol 100 and colchicine 0.6  C/w allopurinol, holding colchicine due to AMTA  Consider restarting colchicine
LFTs  GI eval  Abdominal USG

## 2022-10-06 ENCOUNTER — INPATIENT (INPATIENT)
Facility: HOSPITAL | Age: 42
LOS: 0 days | Discharge: HOME | DRG: 812 | End: 2022-10-07
Attending: INTERNAL MEDICINE | Admitting: INTERNAL MEDICINE
Payer: MEDICAID

## 2022-10-06 VITALS
TEMPERATURE: 98 F | SYSTOLIC BLOOD PRESSURE: 148 MMHG | HEART RATE: 112 BPM | HEIGHT: 69 IN | RESPIRATION RATE: 19 BRPM | DIASTOLIC BLOOD PRESSURE: 72 MMHG | OXYGEN SATURATION: 92 % | WEIGHT: 195.11 LBS

## 2022-10-06 DIAGNOSIS — Z90.49 ACQUIRED ABSENCE OF OTHER SPECIFIED PARTS OF DIGESTIVE TRACT: Chronic | ICD-10-CM

## 2022-10-06 PROCEDURE — 99284 EMERGENCY DEPT VISIT MOD MDM: CPT

## 2022-10-06 PROCEDURE — 93010 ELECTROCARDIOGRAM REPORT: CPT

## 2022-10-06 RX ORDER — FUROSEMIDE 40 MG
20 TABLET ORAL ONCE
Refills: 0 | Status: COMPLETED | OUTPATIENT
Start: 2022-10-06 | End: 2022-10-06

## 2022-10-06 RX ORDER — HYDROMORPHONE HYDROCHLORIDE 2 MG/ML
2 INJECTION INTRAMUSCULAR; INTRAVENOUS; SUBCUTANEOUS ONCE
Refills: 0 | Status: DISCONTINUED | OUTPATIENT
Start: 2022-10-06 | End: 2022-10-06

## 2022-10-06 NOTE — ED PROVIDER NOTE - OBJECTIVE STATEMENT
42 year old male PMH sickle cell, gout coming in for worsening anemia and generalized body pains. pt states has blood work done and was referred for a transfusion of 2 units of prbcs. pt states he also has b/l LE swelling which started this week after starting a new medication. denies all other complaints.

## 2022-10-06 NOTE — ED PROVIDER NOTE - NS ED MD DISPO SPECIAL CONSIDERATION1
Unna Boot Text: An Unna boot was placed to help immobilize the limb and facilitate more rapid healing. None

## 2022-10-06 NOTE — ED PROVIDER NOTE - CLINICAL SUMMARY MEDICAL DECISION MAKING FREE TEXT BOX
42 year old male with anemia and body pains. PE as above.  labs, pain control, likely blood transfusion

## 2022-10-06 NOTE — ED ADULT TRIAGE NOTE - CHIEF COMPLAINT QUOTE
sent by PMD for blood transfusion ,hgb 5.1 ,with dizizness,bodyaches tonight with h/o sickle cell,with bilateral legs swelling

## 2022-10-06 NOTE — ED PROVIDER NOTE - PROGRESS NOTE DETAILS
labs with anemia and elevated retic. prbc 2 units ordered. pain meds given with mild improvement. will admit.

## 2022-10-07 ENCOUNTER — TRANSCRIPTION ENCOUNTER (OUTPATIENT)
Age: 42
End: 2022-10-07

## 2022-10-07 VITALS
RESPIRATION RATE: 17 BRPM | OXYGEN SATURATION: 95 % | DIASTOLIC BLOOD PRESSURE: 89 MMHG | HEART RATE: 74 BPM | SYSTOLIC BLOOD PRESSURE: 146 MMHG | TEMPERATURE: 98 F

## 2022-10-07 DIAGNOSIS — M25.50 PAIN IN UNSPECIFIED JOINT: ICD-10-CM

## 2022-10-07 DIAGNOSIS — Z29.9 ENCOUNTER FOR PROPHYLACTIC MEASURES, UNSPECIFIED: ICD-10-CM

## 2022-10-07 DIAGNOSIS — D57.00 HB-SS DISEASE WITH CRISIS, UNSPECIFIED: ICD-10-CM

## 2022-10-07 DIAGNOSIS — R60.0 LOCALIZED EDEMA: ICD-10-CM

## 2022-10-07 LAB
ALBUMIN SERPL ELPH-MCNC: 3.3 G/DL — LOW (ref 3.5–5)
ALBUMIN SERPL ELPH-MCNC: 3.4 G/DL — LOW (ref 3.5–5)
ALP SERPL-CCNC: 154 U/L — HIGH (ref 40–120)
ALP SERPL-CCNC: 162 U/L — HIGH (ref 40–120)
ALT FLD-CCNC: 54 U/L DA — SIGNIFICANT CHANGE UP (ref 10–60)
ALT FLD-CCNC: 59 U/L DA — SIGNIFICANT CHANGE UP (ref 10–60)
ANION GAP SERPL CALC-SCNC: 6 MMOL/L — SIGNIFICANT CHANGE UP (ref 5–17)
ANION GAP SERPL CALC-SCNC: 7 MMOL/L — SIGNIFICANT CHANGE UP (ref 5–17)
ANISOCYTOSIS BLD QL: SIGNIFICANT CHANGE UP
APTT BLD: 74.6 SEC — HIGH (ref 27.5–35.5)
AST SERPL-CCNC: 100 U/L — HIGH (ref 10–40)
AST SERPL-CCNC: 103 U/L — HIGH (ref 10–40)
BASOPHILS # BLD AUTO: 0.07 K/UL — SIGNIFICANT CHANGE UP (ref 0–0.2)
BASOPHILS NFR BLD AUTO: 0.5 % — SIGNIFICANT CHANGE UP (ref 0–2)
BILIRUB SERPL-MCNC: 5.5 MG/DL — HIGH (ref 0.2–1.2)
BILIRUB SERPL-MCNC: 5.7 MG/DL — HIGH (ref 0.2–1.2)
BLD GP AB SCN SERPL QL: SIGNIFICANT CHANGE UP
BUN SERPL-MCNC: 21 MG/DL — HIGH (ref 7–18)
BUN SERPL-MCNC: 23 MG/DL — HIGH (ref 7–18)
CALCIUM SERPL-MCNC: 8.9 MG/DL — SIGNIFICANT CHANGE UP (ref 8.4–10.5)
CALCIUM SERPL-MCNC: 9.2 MG/DL — SIGNIFICANT CHANGE UP (ref 8.4–10.5)
CHLORIDE SERPL-SCNC: 110 MMOL/L — HIGH (ref 96–108)
CHLORIDE SERPL-SCNC: 111 MMOL/L — HIGH (ref 96–108)
CO2 SERPL-SCNC: 23 MMOL/L — SIGNIFICANT CHANGE UP (ref 22–31)
CO2 SERPL-SCNC: 25 MMOL/L — SIGNIFICANT CHANGE UP (ref 22–31)
CREAT SERPL-MCNC: 1.11 MG/DL — SIGNIFICANT CHANGE UP (ref 0.5–1.3)
CREAT SERPL-MCNC: 1.3 MG/DL — SIGNIFICANT CHANGE UP (ref 0.5–1.3)
EGFR: 70 ML/MIN/1.73M2 — SIGNIFICANT CHANGE UP
EGFR: 84 ML/MIN/1.73M2 — SIGNIFICANT CHANGE UP
ELLIPTOCYTES BLD QL SMEAR: SLIGHT — SIGNIFICANT CHANGE UP
EOSINOPHIL # BLD AUTO: 3.34 K/UL — HIGH (ref 0–0.5)
EOSINOPHIL NFR BLD AUTO: 22.6 % — HIGH (ref 0–6)
GLUCOSE SERPL-MCNC: 106 MG/DL — HIGH (ref 70–99)
GLUCOSE SERPL-MCNC: 96 MG/DL — SIGNIFICANT CHANGE UP (ref 70–99)
HCT VFR BLD CALC: 13.8 % — CRITICAL LOW (ref 39–50)
HCT VFR BLD CALC: 20 % — CRITICAL LOW (ref 39–50)
HGB BLD-MCNC: 5 G/DL — CRITICAL LOW (ref 13–17)
HGB BLD-MCNC: 7.1 G/DL — LOW (ref 13–17)
HYPOCHROMIA BLD QL: SIGNIFICANT CHANGE UP
IMM GRANULOCYTES NFR BLD AUTO: 0.5 % — SIGNIFICANT CHANGE UP (ref 0–0.9)
INR BLD: 1.08 RATIO — SIGNIFICANT CHANGE UP (ref 0.88–1.16)
LYMPHOCYTES # BLD AUTO: 33.6 % — SIGNIFICANT CHANGE UP (ref 13–44)
LYMPHOCYTES # BLD AUTO: 4.97 K/UL — HIGH (ref 1–3.3)
MACROCYTES BLD QL: SIGNIFICANT CHANGE UP
MAGNESIUM SERPL-MCNC: 2.2 MG/DL — SIGNIFICANT CHANGE UP (ref 1.6–2.6)
MANUAL SMEAR VERIFICATION: SIGNIFICANT CHANGE UP
MCHC RBC-ENTMCNC: 31.8 PG — SIGNIFICANT CHANGE UP (ref 27–34)
MCHC RBC-ENTMCNC: 34.7 PG — HIGH (ref 27–34)
MCHC RBC-ENTMCNC: 35.5 GM/DL — SIGNIFICANT CHANGE UP (ref 32–36)
MCHC RBC-ENTMCNC: 36.2 GM/DL — HIGH (ref 32–36)
MCV RBC AUTO: 89.7 FL — SIGNIFICANT CHANGE UP (ref 80–100)
MCV RBC AUTO: 95.8 FL — SIGNIFICANT CHANGE UP (ref 80–100)
MICROCYTES BLD QL: SLIGHT — SIGNIFICANT CHANGE UP
MONOCYTES # BLD AUTO: 1.47 K/UL — HIGH (ref 0–0.9)
MONOCYTES NFR BLD AUTO: 9.9 % — SIGNIFICANT CHANGE UP (ref 2–14)
NEUTROPHILS # BLD AUTO: 4.88 K/UL — SIGNIFICANT CHANGE UP (ref 1.8–7.4)
NEUTROPHILS NFR BLD AUTO: 32.9 % — LOW (ref 43–77)
NRBC # BLD: 2 /100 WBCS — HIGH (ref 0–0)
NRBC # BLD: 2 /100 WBCS — HIGH (ref 0–0)
PHOSPHATE SERPL-MCNC: 4.3 MG/DL — SIGNIFICANT CHANGE UP (ref 2.5–4.5)
PLAT MORPH BLD: NORMAL — SIGNIFICANT CHANGE UP
PLATELET # BLD AUTO: 255 K/UL — SIGNIFICANT CHANGE UP (ref 150–400)
PLATELET # BLD AUTO: 277 K/UL — SIGNIFICANT CHANGE UP (ref 150–400)
PLATELET COUNT - ESTIMATE: NORMAL — SIGNIFICANT CHANGE UP
POIKILOCYTOSIS BLD QL AUTO: SIGNIFICANT CHANGE UP
POLYCHROMASIA BLD QL SMEAR: SLIGHT — SIGNIFICANT CHANGE UP
POTASSIUM SERPL-MCNC: 4.4 MMOL/L — SIGNIFICANT CHANGE UP (ref 3.5–5.3)
POTASSIUM SERPL-MCNC: 4.5 MMOL/L — SIGNIFICANT CHANGE UP (ref 3.5–5.3)
POTASSIUM SERPL-SCNC: 4.4 MMOL/L — SIGNIFICANT CHANGE UP (ref 3.5–5.3)
POTASSIUM SERPL-SCNC: 4.5 MMOL/L — SIGNIFICANT CHANGE UP (ref 3.5–5.3)
PROT SERPL-MCNC: 7.2 G/DL — SIGNIFICANT CHANGE UP (ref 6–8.3)
PROT SERPL-MCNC: 7.5 G/DL — SIGNIFICANT CHANGE UP (ref 6–8.3)
PROTHROM AB SERPL-ACNC: 12.9 SEC — SIGNIFICANT CHANGE UP (ref 10.5–13.4)
RBC # BLD: 1.44 M/UL — LOW (ref 4.2–5.8)
RBC # BLD: 1.45 M/UL — LOW (ref 4.2–5.8)
RBC # BLD: 2.23 M/UL — LOW (ref 4.2–5.8)
RBC # FLD: 21.8 % — HIGH (ref 10.3–14.5)
RBC # FLD: 24.7 % — HIGH (ref 10.3–14.5)
RBC BLD AUTO: ABNORMAL
RETICS #: 318.9 K/UL — HIGH (ref 25–125)
RETICS/RBC NFR: 22 % — HIGH (ref 0.5–2.5)
SARS-COV-2 RNA SPEC QL NAA+PROBE: SIGNIFICANT CHANGE UP
SICKLE CELLS BLD QL SMEAR: SIGNIFICANT CHANGE UP
SODIUM SERPL-SCNC: 140 MMOL/L — SIGNIFICANT CHANGE UP (ref 135–145)
SODIUM SERPL-SCNC: 142 MMOL/L — SIGNIFICANT CHANGE UP (ref 135–145)
TARGETS BLD QL SMEAR: SLIGHT — SIGNIFICANT CHANGE UP
WBC # BLD: 10.86 K/UL — HIGH (ref 3.8–10.5)
WBC # BLD: 14.81 K/UL — HIGH (ref 3.8–10.5)
WBC # FLD AUTO: 10.86 K/UL — HIGH (ref 3.8–10.5)
WBC # FLD AUTO: 14.81 K/UL — HIGH (ref 3.8–10.5)

## 2022-10-07 PROCEDURE — P9040: CPT

## 2022-10-07 PROCEDURE — 85730 THROMBOPLASTIN TIME PARTIAL: CPT

## 2022-10-07 PROCEDURE — 83735 ASSAY OF MAGNESIUM: CPT

## 2022-10-07 PROCEDURE — 86850 RBC ANTIBODY SCREEN: CPT

## 2022-10-07 PROCEDURE — 85045 AUTOMATED RETICULOCYTE COUNT: CPT

## 2022-10-07 PROCEDURE — 84100 ASSAY OF PHOSPHORUS: CPT

## 2022-10-07 PROCEDURE — 96374 THER/PROPH/DIAG INJ IV PUSH: CPT

## 2022-10-07 PROCEDURE — 86922 COMPATIBILITY TEST ANTIGLOB: CPT

## 2022-10-07 PROCEDURE — 36415 COLL VENOUS BLD VENIPUNCTURE: CPT

## 2022-10-07 PROCEDURE — 85025 COMPLETE CBC W/AUTO DIFF WBC: CPT

## 2022-10-07 PROCEDURE — 36430 TRANSFUSION BLD/BLD COMPNT: CPT

## 2022-10-07 PROCEDURE — 80053 COMPREHEN METABOLIC PANEL: CPT

## 2022-10-07 PROCEDURE — 86901 BLOOD TYPING SEROLOGIC RH(D): CPT

## 2022-10-07 PROCEDURE — 87635 SARS-COV-2 COVID-19 AMP PRB: CPT

## 2022-10-07 PROCEDURE — 93005 ELECTROCARDIOGRAM TRACING: CPT

## 2022-10-07 PROCEDURE — 86900 BLOOD TYPING SEROLOGIC ABO: CPT

## 2022-10-07 PROCEDURE — 96375 TX/PRO/DX INJ NEW DRUG ADDON: CPT

## 2022-10-07 PROCEDURE — 99222 1ST HOSP IP/OBS MODERATE 55: CPT

## 2022-10-07 PROCEDURE — 85027 COMPLETE CBC AUTOMATED: CPT

## 2022-10-07 PROCEDURE — 99285 EMERGENCY DEPT VISIT HI MDM: CPT

## 2022-10-07 PROCEDURE — 85610 PROTHROMBIN TIME: CPT

## 2022-10-07 RX ORDER — OXYCODONE HYDROCHLORIDE 5 MG/1
30 TABLET ORAL EVERY 6 HOURS
Refills: 0 | Status: DISCONTINUED | OUTPATIENT
Start: 2022-10-07 | End: 2022-10-07

## 2022-10-07 RX ORDER — LIDOCAINE 4 G/100G
1 CREAM TOPICAL DAILY
Refills: 0 | Status: DISCONTINUED | OUTPATIENT
Start: 2022-10-07 | End: 2022-10-07

## 2022-10-07 RX ORDER — OXYCODONE HYDROCHLORIDE 5 MG/1
30 TABLET ORAL EVERY 8 HOURS
Refills: 0 | Status: DISCONTINUED | OUTPATIENT
Start: 2022-10-07 | End: 2022-10-07

## 2022-10-07 RX ORDER — OXYCODONE HYDROCHLORIDE 5 MG/1
30 TABLET ORAL EVERY 4 HOURS
Refills: 0 | Status: DISCONTINUED | OUTPATIENT
Start: 2022-10-07 | End: 2022-10-07

## 2022-10-07 RX ORDER — ACETAMINOPHEN 500 MG
650 TABLET ORAL ONCE
Refills: 0 | Status: COMPLETED | OUTPATIENT
Start: 2022-10-07 | End: 2022-10-07

## 2022-10-07 RX ORDER — ALLOPURINOL 300 MG
100 TABLET ORAL DAILY
Refills: 0 | Status: DISCONTINUED | OUTPATIENT
Start: 2022-10-07 | End: 2022-10-07

## 2022-10-07 RX ORDER — DIPHENHYDRAMINE HCL 50 MG
50 CAPSULE ORAL ONCE
Refills: 0 | Status: COMPLETED | OUTPATIENT
Start: 2022-10-07 | End: 2022-10-07

## 2022-10-07 RX ORDER — CRIZANLIZUMAB 10 MG/ML
500 INJECTION INTRAVENOUS
Qty: 0 | Refills: 0 | DISCHARGE

## 2022-10-07 RX ORDER — HYDROMORPHONE HYDROCHLORIDE 2 MG/ML
2 INJECTION INTRAMUSCULAR; INTRAVENOUS; SUBCUTANEOUS EVERY 6 HOURS
Refills: 0 | Status: DISCONTINUED | OUTPATIENT
Start: 2022-10-07 | End: 2022-10-07

## 2022-10-07 RX ORDER — HYDROMORPHONE HYDROCHLORIDE 2 MG/ML
2 INJECTION INTRAMUSCULAR; INTRAVENOUS; SUBCUTANEOUS EVERY 4 HOURS
Refills: 0 | Status: DISCONTINUED | OUTPATIENT
Start: 2022-10-07 | End: 2022-10-07

## 2022-10-07 RX ORDER — POLYETHYLENE GLYCOL 3350 17 G/17G
17 POWDER, FOR SOLUTION ORAL DAILY
Refills: 0 | Status: DISCONTINUED | OUTPATIENT
Start: 2022-10-07 | End: 2022-10-07

## 2022-10-07 RX ORDER — ENOXAPARIN SODIUM 100 MG/ML
40 INJECTION SUBCUTANEOUS EVERY 24 HOURS
Refills: 0 | Status: DISCONTINUED | OUTPATIENT
Start: 2022-10-07 | End: 2022-10-07

## 2022-10-07 RX ORDER — SODIUM CHLORIDE 9 MG/ML
1000 INJECTION, SOLUTION INTRAVENOUS
Refills: 0 | Status: DISCONTINUED | OUTPATIENT
Start: 2022-10-07 | End: 2022-10-07

## 2022-10-07 RX ORDER — HYDROMORPHONE HYDROCHLORIDE 2 MG/ML
3 INJECTION INTRAMUSCULAR; INTRAVENOUS; SUBCUTANEOUS ONCE
Refills: 0 | Status: DISCONTINUED | OUTPATIENT
Start: 2022-10-07 | End: 2022-10-07

## 2022-10-07 RX ADMIN — HYDROMORPHONE HYDROCHLORIDE 2 MILLIGRAM(S): 2 INJECTION INTRAMUSCULAR; INTRAVENOUS; SUBCUTANEOUS at 11:43

## 2022-10-07 RX ADMIN — Medication 650 MILLIGRAM(S): at 11:47

## 2022-10-07 RX ADMIN — Medication 50 MILLIGRAM(S): at 03:52

## 2022-10-07 RX ADMIN — HYDROMORPHONE HYDROCHLORIDE 2 MILLIGRAM(S): 2 INJECTION INTRAMUSCULAR; INTRAVENOUS; SUBCUTANEOUS at 00:38

## 2022-10-07 RX ADMIN — HYDROMORPHONE HYDROCHLORIDE 2 MILLIGRAM(S): 2 INJECTION INTRAMUSCULAR; INTRAVENOUS; SUBCUTANEOUS at 15:42

## 2022-10-07 RX ADMIN — Medication 650 MILLIGRAM(S): at 09:13

## 2022-10-07 RX ADMIN — Medication 650 MILLIGRAM(S): at 03:52

## 2022-10-07 RX ADMIN — Medication 100 MILLIGRAM(S): at 11:43

## 2022-10-07 RX ADMIN — SODIUM CHLORIDE 100 MILLILITER(S): 9 INJECTION, SOLUTION INTRAVENOUS at 07:00

## 2022-10-07 RX ADMIN — HYDROMORPHONE HYDROCHLORIDE 2 MILLIGRAM(S): 2 INJECTION INTRAMUSCULAR; INTRAVENOUS; SUBCUTANEOUS at 12:43

## 2022-10-07 RX ADMIN — HYDROMORPHONE HYDROCHLORIDE 2 MILLIGRAM(S): 2 INJECTION INTRAMUSCULAR; INTRAVENOUS; SUBCUTANEOUS at 23:19

## 2022-10-07 RX ADMIN — HYDROMORPHONE HYDROCHLORIDE 2 MILLIGRAM(S): 2 INJECTION INTRAMUSCULAR; INTRAVENOUS; SUBCUTANEOUS at 22:39

## 2022-10-07 RX ADMIN — Medication 50 MILLIGRAM(S): at 09:13

## 2022-10-07 RX ADMIN — HYDROMORPHONE HYDROCHLORIDE 2 MILLIGRAM(S): 2 INJECTION INTRAMUSCULAR; INTRAVENOUS; SUBCUTANEOUS at 02:10

## 2022-10-07 RX ADMIN — HYDROMORPHONE HYDROCHLORIDE 2 MILLIGRAM(S): 2 INJECTION INTRAMUSCULAR; INTRAVENOUS; SUBCUTANEOUS at 08:04

## 2022-10-07 RX ADMIN — Medication 20 MILLIGRAM(S): at 00:38

## 2022-10-07 RX ADMIN — LIDOCAINE 1 PATCH: 4 CREAM TOPICAL at 16:23

## 2022-10-07 RX ADMIN — HYDROMORPHONE HYDROCHLORIDE 2 MILLIGRAM(S): 2 INJECTION INTRAMUSCULAR; INTRAVENOUS; SUBCUTANEOUS at 16:00

## 2022-10-07 RX ADMIN — HYDROMORPHONE HYDROCHLORIDE 3 MILLIGRAM(S): 2 INJECTION INTRAMUSCULAR; INTRAVENOUS; SUBCUTANEOUS at 02:26

## 2022-10-07 RX ADMIN — Medication 650 MILLIGRAM(S): at 05:36

## 2022-10-07 RX ADMIN — HYDROMORPHONE HYDROCHLORIDE 3 MILLIGRAM(S): 2 INJECTION INTRAMUSCULAR; INTRAVENOUS; SUBCUTANEOUS at 03:33

## 2022-10-07 RX ADMIN — HYDROMORPHONE HYDROCHLORIDE 2 MILLIGRAM(S): 2 INJECTION INTRAMUSCULAR; INTRAVENOUS; SUBCUTANEOUS at 19:00

## 2022-10-07 NOTE — CONSULT NOTE ADULT - SUBJECTIVE AND OBJECTIVE BOX
Patient is a 42y old  Male who presents with a chief complaint of Sickle cell crisis (07 Oct 2022 03:10)      HPI:  This is a 43 yo M PMHx sickle cell disease, gout and cholecystectomy who presents with worsening anemia and diffuse abdominal pain. Patient states he was seen by Dr. Jordan on 10/6, and blood work revealed he had worsening anemia, Dr. Jordan told patient to present to ED to receive 2U of PRBCs. However, patient went home afterwards, however, when woke up was in severe pain. He thus came to ED. Patient also mentions starting Crizanlizumab on 10/1/22, and afterwards he developed insomnia, jitteriness, and LE swelling.     In ED v/s: /77, HR 80, RR18, T 97.9, SPO2: 94% on RA (07 Oct 2022 03:10)       ROS:  Negative except for:    PAST MEDICAL & SURGICAL HISTORY:  Sickle cell anemia      Gout      History of cholecystectomy          SOCIAL HISTORY:    FAMILY HISTORY:  Family history of sickle cell trait (Father, Mother)        MEDICATIONS  (STANDING):  allopurinol 100 milliGRAM(s) Oral daily  enoxaparin Injectable 40 milliGRAM(s) SubCutaneous every 24 hours  lactated ringers. 1000 milliLiter(s) (100 mL/Hr) IV Continuous <Continuous>  polyethylene glycol 3350 17 Gram(s) Oral daily    MEDICATIONS  (PRN):  HYDROmorphone  Injectable 2 milliGRAM(s) IV Push every 6 hours PRN Severe Pain (7 - 10)  oxyCODONE    IR 30 milliGRAM(s) Oral every 8 hours PRN Moderate Pain (4 - 6)      Allergies    ceftriaxone (Anaphylaxis)  hydroxyurea (Other)  penicillin (Pruritus)  piperacillin-tazobactam (Urticaria)    Intolerances        Vital Signs Last 24 Hrs  T(C): 36.4 (07 Oct 2022 08:51), Max: 36.9 (06 Oct 2022 21:48)  T(F): 97.6 (07 Oct 2022 08:51), Max: 98.4 (06 Oct 2022 21:48)  HR: 73 (07 Oct 2022 08:51) (73 - 112)  BP: 120/72 (07 Oct 2022 08:51) (120/72 - 148/72)  BP(mean): --  RR: 16 (07 Oct 2022 08:51) (16 - 19)  SpO2: 94% (07 Oct 2022 08:51) (92% - 95%)    Parameters below as of 07 Oct 2022 08:51  Patient On (Oxygen Delivery Method): room air        PHYSICAL EXAM  General: adult in NAD  HEENT: clear oropharynx, anicteric sclera, pink conjunctiva  Neck: supple  CV: normal S1/S2 with no murmur rubs or gallops  Lungs: positive air movement b/l ant lungs,clear to auscultation, no wheezes, no rales  Abdomen: soft non-tender non-distended, no hepatosplenomegaly  Ext: no clubbing cyanosis or edema  Skin: no rashes and no petechiae  Neuro: alert and oriented X 4, no focal deficits      LABS:                          5.0    14.81 )-----------( 277      ( 07 Oct 2022 00:35 )             13.8         Mean Cell Volume : 95.8 fl  Mean Cell Hemoglobin : 34.7 pg  Mean Cell Hemoglobin Concentration : 36.2 gm/dL  Auto Neutrophil # : 4.88 K/uL  Auto Lymphocyte # : 4.97 K/uL  Auto Monocyte # : 1.47 K/uL  Auto Eosinophil # : 3.34 K/uL  Auto Basophil # : 0.07 K/uL  Auto Neutrophil % : 32.9 %  Auto Lymphocyte % : 33.6 %  Auto Monocyte % : 9.9 %  Auto Eosinophil % : 22.6 %  Auto Basophil % : 0.5 %      Serial CBC's  10-07 @ 00:35  Hct-13.8 / Hgb-5.0 / Plat-277 / RBC-1.44 / WBC-14.81      10-07    140  |  110<H>  |  21<H>  ----------------------------<  96  4.5   |  23  |  1.30    Ca    9.2      07 Oct 2022 00:35    TPro  7.5  /  Alb  3.4<L>  /  TBili  5.5<H>  /  DBili  x   /  AST  103<H>  /  ALT  59  /  AlkPhos  162<H>  10-07      PT/INR - ( 07 Oct 2022 00:35 )   PT: 12.9 sec;   INR: 1.08 ratio         PTT - ( 07 Oct 2022 00:35 )  PTT:74.6 sec    Reticulocyte Percent: 22.0 % (10-07 @ 00:35)              BLOOD SMEAR INTERPRETATION:       RADIOLOGY & ADDITIONAL STUDIES:

## 2022-10-07 NOTE — DISCHARGE NOTE NURSING/CASE MANAGEMENT/SOCIAL WORK - PATIENT PORTAL LINK FT
You can access the FollowMyHealth Patient Portal offered by [facility] by registering at the following website: [email or web address]. By joining [facility]’s FollowMyHealth portal, you will also be able to view your health information using other applications (apps) compatible with our system.

## 2022-10-07 NOTE — CHART NOTE - NSCHARTNOTEFT_GEN_A_CORE
EVENT:    REVIEW OF SYSTEMS:  CONSTITUTIONAL: No fever, chills  ENMT:  No difficulty hearing, no change in vision  NECK: No pain or stiffness  RESPIRATORY: No cough, SOB  CARDIOVASCULAR: No chest pain, palpitations  GASTROINTESTINAL: No abdominal pain. No nausea, vomiting, or diarrhea  GENITOURINARY: No dysuria  NEUROLOGICAL: No HA  SKIN: No itching, burning, rashes, or lesions   LYMPH NODES: No enlarged glands  ENDOCRINE: No heat or cold intolerance; No hair loss  MUSCULOSKELETAL: No joint pain or swelling; No muscle, back, or extremity pain  PSYCHIATRIC: No depression, anxiety  HEME/LYMPH: No easy bruising, or bleeding gums    T(C): 36.4 (10-07-22 @ 11:45), Max: 36.9 (10-06-22 @ 21:48)  HR: 70 (10-07-22 @ 11:45) (70 - 112)  BP: 146/80 (10-07-22 @ 11:45) (120/72 - 148/72)  RR: 16 (10-07-22 @ 11:45) (16 - 19)  SpO2: 95% (10-07-22 @ 11:45) (92% - 95%)  Wt(kg): --Vital Signs Last 24 Hrs  T(C): 36.4 (07 Oct 2022 11:45), Max: 36.9 (06 Oct 2022 21:48)  T(F): 97.6 (07 Oct 2022 11:45), Max: 98.4 (06 Oct 2022 21:48)  HR: 70 (07 Oct 2022 11:45) (70 - 112)  BP: 146/80 (07 Oct 2022 11:45) (120/72 - 148/72)  BP(mean): --  RR: 16 (07 Oct 2022 11:45) (16 - 19)  SpO2: 95% (07 Oct 2022 11:45) (92% - 95%)    Parameters below as of 07 Oct 2022 11:45  Patient On (Oxygen Delivery Method): room air        MEDICATIONS  (STANDING):  allopurinol 100 milliGRAM(s) Oral daily  enoxaparin Injectable 40 milliGRAM(s) SubCutaneous every 24 hours  HYDROmorphone  Injectable 2 milliGRAM(s) IV Push every 4 hours  lactated ringers. 1000 milliLiter(s) (100 mL/Hr) IV Continuous <Continuous>  polyethylene glycol 3350 17 Gram(s) Oral daily    MEDICATIONS  (PRN):  oxyCODONE    IR 30 milliGRAM(s) Oral every 8 hours PRN Moderate Pain (4 - 6)      FOCUSED PHYSICAL EXAM:  GENERAL: NAD  EYES: clear conjunctiva; EOMI  ENMT: Moist mucous membranes  NECK: Supple, No JVD, Normal thyroid  CHEST/LUNG: Clear to auscultation bilaterally; No rales, rhonchi, wheezing, or rubs  HEART: S1, S2, Regular rate and rhythm  ABDOMEN: Soft, Nontender, Nondistended; Bowel sounds present  NEURO: Alert & Oriented X3  EXTREMITIES: No LE edema, no calf tenderness  LYMPH: No lymphadenopathy noted  SKIN: No rashes or lesions    LABS:                        5.0    14.81 )-----------( 277      ( 07 Oct 2022 00:35 )             13.8     10-07    140  |  110<H>  |  21<H>  ----------------------------<  96  4.5   |  23  |  1.30    Ca    9.2      07 Oct 2022 00:35    TPro  7.5  /  Alb  3.4<L>  /  TBili  5.5<H>  /  DBili  x   /  AST  103<H>  /  ALT  59  /  AlkPhos  162<H>  10-07    PT/INR - ( 07 Oct 2022 00:35 )   PT: 12.9 sec;   INR: 1.08 ratio         PTT - ( 07 Oct 2022 00:35 )  PTT:74.6 sec  CAPILLARY BLOOD GLUCOSE                ASSESSMENT:    PLAN:    FOLLOW UP/RESULT:    D/W: Overnight: no acute changes    REVIEW OF SYSTEMS:  CONSTITUTIONAL: No fever, chills  ENMT:  No difficulty hearing, no change in vision  NECK: No pain or stiffness  RESPIRATORY: No cough, SOB  CARDIOVASCULAR: No chest pain, palpitations  GASTROINTESTINAL: No abdominal pain. No nausea, vomiting, or diarrhea  GENITOURINARY: No dysuria  NEUROLOGICAL: No HA  SKIN: No itching, burning, rashes, or lesions   LYMPH NODES: No enlarged glands  ENDOCRINE: No heat or cold intolerance; No hair loss  MUSCULOSKELETAL: +lower back and bilateral thigh pain. No joint pain or swelling; No muscle, back, or extremity pain  PSYCHIATRIC: No depression, anxiety  HEME/LYMPH: No easy bruising, or bleeding gums    T(C): 36.4 (10-07-22 @ 11:45), Max: 36.9 (10-06-22 @ 21:48)  HR: 70 (10-07-22 @ 11:45) (70 - 112)  BP: 146/80 (10-07-22 @ 11:45) (120/72 - 148/72)  RR: 16 (10-07-22 @ 11:45) (16 - 19)  SpO2: 95% (10-07-22 @ 11:45) (92% - 95%)  Wt(kg): --Vital Signs Last 24 Hrs  T(C): 36.4 (07 Oct 2022 11:45), Max: 36.9 (06 Oct 2022 21:48)  T(F): 97.6 (07 Oct 2022 11:45), Max: 98.4 (06 Oct 2022 21:48)  HR: 70 (07 Oct 2022 11:45) (70 - 112)  BP: 146/80 (07 Oct 2022 11:45) (120/72 - 148/72)  BP(mean): --  RR: 16 (07 Oct 2022 11:45) (16 - 19)  SpO2: 95% (07 Oct 2022 11:45) (92% - 95%)    Parameters below as of 07 Oct 2022 11:45  Patient On (Oxygen Delivery Method): room air        MEDICATIONS  (STANDING):  allopurinol 100 milliGRAM(s) Oral daily  enoxaparin Injectable 40 milliGRAM(s) SubCutaneous every 24 hours  HYDROmorphone  Injectable 2 milliGRAM(s) IV Push every 4 hours  lactated ringers. 1000 milliLiter(s) (100 mL/Hr) IV Continuous <Continuous>  polyethylene glycol 3350 17 Gram(s) Oral daily    MEDICATIONS  (PRN):  oxyCODONE    IR 30 milliGRAM(s) Oral every 8 hours PRN Moderate Pain (4 - 6)      FOCUSED PHYSICAL EXAM:  GENERAL: well-appearing, NAD  EYES: +scleral icterus  ENMT: Moist mucous membranes  NECK: Supple, No JVD, Normal thyroid  CHEST/LUNG: +right chest port. Clear to auscultation bilaterally; No rales, rhonchi, wheezing, or rubs  HEART: S1, S2, Regular rate and rhythm  ABDOMEN: Soft, Nontender, Nondistended; Bowel sounds present  NEURO: Alert & Oriented X3  EXTREMITIES: b/l pedal edema +2. no calf tenderness  LYMPH: No lymphadenopathy noted  SKIN: +jaundice. No rashes or lesions    LABS:                        5.0    14.81 )-----------( 277      ( 07 Oct 2022 00:35 )             13.8     10-07    140  |  110<H>  |  21<H>  ----------------------------<  96  4.5   |  23  |  1.30    Ca    9.2      07 Oct 2022 00:35    TPro  7.5  /  Alb  3.4<L>  /  TBili  5.5<H>  /  DBili  x   /  AST  103<H>  /  ALT  59  /  AlkPhos  162<H>  10-07    PT/INR - ( 07 Oct 2022 00:35 )   PT: 12.9 sec;   INR: 1.08 ratio         PTT - ( 07 Oct 2022 00:35 )  PTT:74.6 sec  CAPILLARY BLOOD GLUCOSE                ASSESSMENT:  43 yo M PMHx sickle cell disease, gout and cholecystectomy who presents with worsening anemia and diffuse abdominal pain. admitted for Sickle cell crisis. Hematology Dr. Jordan following. Pain management consulted. S/P 2 units PRBC. Pending improvement of hgb.     Problem/Plan - 1:  ·  Problem: Sickle cell crisis.   ·  Plan: Pt presenting with Sickle cell crisis, Hgb 5.0, baseline around 6  Reticulocyte count 22%  continue LR 100cc/hr  Pain management  Will receive 2U PRBCs  f/u post transfusion cbc  Dr. Jordan heme onc consulted.    Problem/Plan - 2:  ·  Problem: Lower extremity edema.   ·  Plan: Pt presenting with LE edema, after taking Crizanlizumab  will continue to give IVF in setting of Sickle cell crisis.    Problem/Plan - 3:  ·  Problem: Prophylactic measure.   ·  Plan: Lovenox for DVT ppx. Overnight: no acute changes    REVIEW OF SYSTEMS:  CONSTITUTIONAL: No fever, chills  ENMT:  No difficulty hearing, no change in vision  NECK: No pain or stiffness  RESPIRATORY: No cough, SOB  CARDIOVASCULAR: No chest pain, palpitations  GASTROINTESTINAL: No abdominal pain. No nausea, vomiting, or diarrhea  GENITOURINARY: No dysuria  NEUROLOGICAL: No HA  SKIN: No itching, burning, rashes, or lesions   LYMPH NODES: No enlarged glands  ENDOCRINE: No heat or cold intolerance; No hair loss  MUSCULOSKELETAL: +lower back and bilateral thigh pain. No joint pain or swelling; No muscle, back, or extremity pain  PSYCHIATRIC: No depression, anxiety  HEME/LYMPH: No easy bruising, or bleeding gums    T(C): 36.4 (10-07-22 @ 11:45), Max: 36.9 (10-06-22 @ 21:48)  HR: 70 (10-07-22 @ 11:45) (70 - 112)  BP: 146/80 (10-07-22 @ 11:45) (120/72 - 148/72)  RR: 16 (10-07-22 @ 11:45) (16 - 19)  SpO2: 95% (10-07-22 @ 11:45) (92% - 95%)  Wt(kg): --Vital Signs Last 24 Hrs  T(C): 36.4 (07 Oct 2022 11:45), Max: 36.9 (06 Oct 2022 21:48)  T(F): 97.6 (07 Oct 2022 11:45), Max: 98.4 (06 Oct 2022 21:48)  HR: 70 (07 Oct 2022 11:45) (70 - 112)  BP: 146/80 (07 Oct 2022 11:45) (120/72 - 148/72)  BP(mean): --  RR: 16 (07 Oct 2022 11:45) (16 - 19)  SpO2: 95% (07 Oct 2022 11:45) (92% - 95%)    Parameters below as of 07 Oct 2022 11:45  Patient On (Oxygen Delivery Method): room air        MEDICATIONS  (STANDING):  allopurinol 100 milliGRAM(s) Oral daily  enoxaparin Injectable 40 milliGRAM(s) SubCutaneous every 24 hours  HYDROmorphone  Injectable 2 milliGRAM(s) IV Push every 4 hours  lactated ringers. 1000 milliLiter(s) (100 mL/Hr) IV Continuous <Continuous>  polyethylene glycol 3350 17 Gram(s) Oral daily    MEDICATIONS  (PRN):  oxyCODONE    IR 30 milliGRAM(s) Oral every 8 hours PRN Moderate Pain (4 - 6)      FOCUSED PHYSICAL EXAM:  GENERAL: well-appearing, NAD  EYES: +scleral icterus  ENMT: Moist mucous membranes  NECK: Supple, No JVD, Normal thyroid  CHEST/LUNG: +right chest port. Clear to auscultation bilaterally; No rales, rhonchi, wheezing, or rubs  HEART: S1, S2, Regular rate and rhythm  ABDOMEN: Soft, Nontender, Nondistended; Bowel sounds present  NEURO: Alert & Oriented X3  EXTREMITIES: b/l pedal edema +2. no calf tenderness  LYMPH: No lymphadenopathy noted  SKIN: +jaundice. No rashes or lesions    LABS:                        5.0    14.81 )-----------( 277      ( 07 Oct 2022 00:35 )             13.8     10-07    140  |  110<H>  |  21<H>  ----------------------------<  96  4.5   |  23  |  1.30    Ca    9.2      07 Oct 2022 00:35    TPro  7.5  /  Alb  3.4<L>  /  TBili  5.5<H>  /  DBili  x   /  AST  103<H>  /  ALT  59  /  AlkPhos  162<H>  10-07    PT/INR - ( 07 Oct 2022 00:35 )   PT: 12.9 sec;   INR: 1.08 ratio         PTT - ( 07 Oct 2022 00:35 )  PTT:74.6 sec  CAPILLARY BLOOD GLUCOSE                ASSESSMENT:  43 yo M PMHx sickle cell disease, gout and cholecystectomy who presents with worsening anemia and diffuse abdominal pain. admitted for Sickle cell crisis. Hematology Dr. Jordan following. Pain management consulted. S/P 2 units PRBC. Pending improvement of hgb.     Problem/Plan - 1:  ·  Problem: Sickle cell crisis.   ·  Plan: Pt presenting with Sickle cell crisis, Hgb 5.0, baseline around 6  Reticulocyte count 22%  continue LR 100cc/hr  Will receive 2U PRBCs  f/u post transfusion cbc  Pain management following  Dr. Jordan heme onc consulted.    Problem/Plan - 2:  ·  Problem: Lower extremity edema.   ·  Plan: Pt presenting with LE edema, after taking Crizanlizumab  s/p lasix in between blood transfusions  continue to give IVF in setting of Sickle cell crisis    3. Gout  continue home med allopurinol    Problem/Plan - 4:  ·  Problem: Prophylactic measure.   ·  Plan: Lovenox for DVT ppx. Overnight: no acute changes    REVIEW OF SYSTEMS:  CONSTITUTIONAL: No fever, chills  ENMT:  No difficulty hearing, no change in vision  NECK: No pain or stiffness  RESPIRATORY: No cough, SOB  CARDIOVASCULAR: No chest pain, palpitations  GASTROINTESTINAL: No abdominal pain. No nausea, vomiting, or diarrhea  GENITOURINARY: No dysuria  NEUROLOGICAL: No HA  SKIN: No itching, burning, rashes, or lesions   LYMPH NODES: No enlarged glands  ENDOCRINE: No heat or cold intolerance; No hair loss  MUSCULOSKELETAL: +lower back and bilateral thigh pain. No joint pain or swelling; No muscle, back, or extremity pain  PSYCHIATRIC: No depression, anxiety  HEME/LYMPH: No easy bruising, or bleeding gums    T(C): 36.4 (10-07-22 @ 11:45), Max: 36.9 (10-06-22 @ 21:48)  HR: 70 (10-07-22 @ 11:45) (70 - 112)  BP: 146/80 (10-07-22 @ 11:45) (120/72 - 148/72)  RR: 16 (10-07-22 @ 11:45) (16 - 19)  SpO2: 95% (10-07-22 @ 11:45) (92% - 95%)  Wt(kg): --Vital Signs Last 24 Hrs  T(C): 36.4 (07 Oct 2022 11:45), Max: 36.9 (06 Oct 2022 21:48)  T(F): 97.6 (07 Oct 2022 11:45), Max: 98.4 (06 Oct 2022 21:48)  HR: 70 (07 Oct 2022 11:45) (70 - 112)  BP: 146/80 (07 Oct 2022 11:45) (120/72 - 148/72)  BP(mean): --  RR: 16 (07 Oct 2022 11:45) (16 - 19)  SpO2: 95% (07 Oct 2022 11:45) (92% - 95%)    Parameters below as of 07 Oct 2022 11:45  Patient On (Oxygen Delivery Method): room air        MEDICATIONS  (STANDING):  allopurinol 100 milliGRAM(s) Oral daily  enoxaparin Injectable 40 milliGRAM(s) SubCutaneous every 24 hours  HYDROmorphone  Injectable 2 milliGRAM(s) IV Push every 4 hours  lactated ringers. 1000 milliLiter(s) (100 mL/Hr) IV Continuous <Continuous>  polyethylene glycol 3350 17 Gram(s) Oral daily    MEDICATIONS  (PRN):  oxyCODONE    IR 30 milliGRAM(s) Oral every 8 hours PRN Moderate Pain (4 - 6)      FOCUSED PHYSICAL EXAM:  GENERAL: well-appearing, NAD  EYES: +scleral icterus  ENMT: Moist mucous membranes  NECK: Supple, No JVD, Normal thyroid  CHEST/LUNG: +right chest port. Clear to auscultation bilaterally; No rales, rhonchi, wheezing, or rubs  HEART: S1, S2, Regular rate and rhythm  ABDOMEN: Soft, Nontender, Nondistended; Bowel sounds present  NEURO: Alert & Oriented X3  EXTREMITIES: b/l pedal edema +2. no calf tenderness  LYMPH: No lymphadenopathy noted  SKIN: +jaundice. No rashes or lesions    LABS:                        5.0    14.81 )-----------( 277      ( 07 Oct 2022 00:35 )             13.8     10-07    140  |  110<H>  |  21<H>  ----------------------------<  96  4.5   |  23  |  1.30    Ca    9.2      07 Oct 2022 00:35    TPro  7.5  /  Alb  3.4<L>  /  TBili  5.5<H>  /  DBili  x   /  AST  103<H>  /  ALT  59  /  AlkPhos  162<H>  10-07    PT/INR - ( 07 Oct 2022 00:35 )   PT: 12.9 sec;   INR: 1.08 ratio         PTT - ( 07 Oct 2022 00:35 )  PTT:74.6 sec  CAPILLARY BLOOD GLUCOSE                ASSESSMENT:  41 yo M PMHx sickle cell disease, gout and cholecystectomy who presents with worsening anemia and diffuse abdominal pain. admitted for Sickle cell crisis. Hematology Dr. Jordan following. Pain management consulted. S/P 2 units PRBC. Pending improvement of hgb.       Problem/Plan - 1:  ·  Problem: Sickle cell crisis.   ·  Plan: Pt presenting with Sickle cell crisis, Hgb 5.0, baseline around 6  Reticulocyte count 22%  continue LR 100cc/hr  Will receive 2U PRBCs  f/u post transfusion cbc  goal hgb > 6  Pain management following  Dr. Jordan heme onc consulted.    Problem/Plan - 2:  ·  Problem: Lower extremity edema.   ·  Plan: Pt presenting with LE edema, after taking Crizanlizumab  s/p lasix in between blood transfusions  continue to give IVF in setting of Sickle cell crisis    3. Gout  continue home med allopurinol    Problem/Plan - 4:  ·  Problem: Prophylactic measure.   ·  Plan: Lovenox for DVT ppx.

## 2022-10-07 NOTE — CONSULT NOTE ADULT - PROBLEM SELECTOR RECOMMENDATION 9
Pt with acute generalized joint pain on lower back and bilateral legs which is somatic in nature due to SCC. Retic % 13.6.+ anemia h/h 7.4/21.4. s/p 1 unit PRBC + MATA hx gout. Pt is opioid dependent per Istop takes Oxycodone 30 mg po q6h PRN.  Opioid pain recommendations   - Continue oxycodone 30 mg PO q 6 hours PRN moderate pain. (home dose) Monitor for sedation/ respiratory depression.   - Continue Dilaudid 2mg IV q4h PRN severe pain. Monitor for sedation/ respiratory depression.   Non-opioid pain recommendations   - Avoid NSAIDs- anemia  - Avoid Acetaminophen, transaminitis, elevated LFT's  - Continue allopurinol 100mg PO daily per primary  - Start lidocaine patch 4%  Bowel Regimen  - Miralax 17G PO daily  - Continue Senna 2 tablets at bedtime for constipation  Mild pain   - Non-pharmacological pain treatment recommendations  - Warm/ Cool packs PRN   - Repositioning, imagery, relaxation, distraction.  - Physical therapy OOB if no contraindications   Recommendations discussed with primary team and RN. Pt with acute generalized joint pain on lower back and bilateral legs which is somatic in nature due to SCC. Retic % 13.6.+ anemia h/h 7.4/21.4. s/p 1 unit PRBC + MATA hx gout. Pt is opioid dependent per Istop takes Oxycodone 30 mg po q6h PRN.  Opioid pain recommendations   - Continue oxycodone 30 mg PO q 4 hours PRN moderate pain. (home dose) Monitor for sedation/ respiratory depression.   - Continue Dilaudid 2mg IV q4h PRN severe pain. Monitor for sedation/ respiratory depression.   Non-opioid pain recommendations   - Avoid NSAIDs- anemia  - Avoid Acetaminophen, transaminitis, elevated LFT's  - Continue allopurinol 100mg PO daily per primary  - Start lidocaine patch 4%  Bowel Regimen  - Miralax 17G PO daily  - Continue Senna 2 tablets at bedtime for constipation  Mild pain   - Non-pharmacological pain treatment recommendations  - Warm/ Cool packs PRN   - Repositioning, imagery, relaxation, distraction.  - Physical therapy OOB if no contraindications   Recommendations discussed with primary team and RN.

## 2022-10-07 NOTE — H&P ADULT - NSHPPHYSICALEXAM_GEN_ALL_CORE
LOS:     VITALS:   T(C): 36.6 (10-07-22 @ 00:37), Max: 36.9 (10-06-22 @ 21:48)  HR: 80 (10-07-22 @ 00:37) (80 - 112)  BP: 135/77 (10-07-22 @ 00:37) (135/77 - 148/72)  RR: 18 (10-07-22 @ 00:37) (18 - 19)  SpO2: 94% (10-07-22 @ 00:37) (92% - 94%)    GENERAL: NAD, lying in bed comfortably  HEAD:  Atraumatic, Normocephalic  EYES: Scleral icterus, EOMI, PERRLA  ENT: Moist mucous membranes  NECK: Supple, No JVD  CHEST/LUNG: Clear to auscultation bilaterally; No rales, rhonchi, wheezing, or rubs. Unlabored respirations  HEART: Regular rate and rhythm; No murmurs, rubs, or gallops  ABDOMEN: BSx4; Soft, nontender, nondistended  EXTREMITIES:  2+ Peripheral Pulses, brisk capillary refill. No clubbing, cyanosis, or edema  NERVOUS SYSTEM:  A&Ox3, no focal deficits   SKIN: +Jaundice

## 2022-10-07 NOTE — H&P ADULT - ATTENDING COMMENTS
43 yo M PMHx sickle cell disease, gout and cholecystectomy who presents with worsening anemia and diffuse abdominal pain. Patient states he was seen by Dr. Jordan on 10/6, and blood work revealed he had worsening anemia, Dr. Jordan told patient to present to ED to receive 2U of PRBCs. However, patient went home afterwards, however, when woke up was in severe pain. He thus came to ED. Patient also mentions starting Crizanlizumab on 10/1/22, and afterwards he developed insomnia, jitteriness, and LE swelling.     In ED v/s: /77, HR 80, RR18, T 97.9, SPO2: 94% on RA       assessment  --  sickle brenda pain crisis, anemia 2nd sickle cell disease, LE edema, h/o sickle cell disease, gout and cholecystectomy    plan  --  admit to med,  ivf, 2 unit prbc, lasix 20 mg between units, cont pain control, cont preadmit home meds, gi and dvt prophylaxis  cbc, bmp, mg, phos, lipids, tsh, bld cx, ua, ucx,     cxr    pain mgmt cons  heme onc cons    pulm cons .

## 2022-10-07 NOTE — ED ADULT NURSE REASSESSMENT NOTE - NS ED NURSE REASSESS COMMENT FT1
Received from Miguel RN at 8am in no distress. VSS  2ND UNIT PRBC infusing with no reaction noted. Will monitor.

## 2022-10-07 NOTE — DISCHARGE NOTE NURSING/CASE MANAGEMENT/SOCIAL WORK - NSDCPEFALRISK_GEN_ALL_CORE
For information on Fall & Injury Prevention, visit: [email or web address] OR  [email or web address] OR  https://www.cdc.gov/steadi/patient.html

## 2022-10-07 NOTE — H&P ADULT - PROBLEM SELECTOR PLAN 1
Pt presenting with Sickle cell crisis, Hgb 5.0, baseline around 6  Reticulocyte count 22%  Will give LR 100cc/hr  Pain management  Will receive 2U PRBCs  f/u post transfusion cbc  Dr. Julian olson onc consulted

## 2022-10-07 NOTE — DISCHARGE NOTE PROVIDER - PROVIDER TOKENS
Patient informed
FREE:[LAST:[Jordan],FIRST:[Shirley],PHONE:[(   )    -],FAX:[(   )    -],ADDRESS:[Out patient hematology/oncologuy clinic],FOLLOWUP:[2 weeks],ESTABLISHEDPATIENT:[T]]

## 2022-10-07 NOTE — H&P ADULT - ASSESSMENT
This is a 43 yo M PMHx sickle cell disease, gout and cholecystectomy who presents with worsening anemia and diffuse abdominal pain admitted for Sickle cell crisis

## 2022-10-07 NOTE — H&P ADULT - HISTORY OF PRESENT ILLNESS
This is a 41 yo M PMHx sickle cell disease, gout and cholecystectomy who presents with worsening anemia and diffuse abdominal pain. Patient states he was seen by Dr. Jordan on 10/6, and blood work revealed he had worsening anemia, Dr. Jordan told patient to present to ED to receive 2U of PRBCs. However, patient went home afterwards, however, when woke up was in severe pain. He thus came to ED. Patient also mentions starting Crizanlizumab on 10/1/22, and afterwards he developed insomnia, jitteriness, and LE swelling.     In ED v/s: /77, HR 80, RR18, T 97.9, SPO2: 94% on RA

## 2022-10-07 NOTE — CONSULT NOTE ADULT - SUBJECTIVE AND OBJECTIVE BOX
Source of information: TOLU VARGAS, Chart review  Patient language: English  : n/a    HPI:  This is a 43 yo M PMHx sickle cell disease, gout and cholecystectomy who presents with worsening anemia and diffuse abdominal pain. Patient states he was seen by Dr. Jordan on 10/6, and blood work revealed he had worsening anemia, Dr. Jordan told patient to present to ED to receive 2U of PRBCs. However, patient went home afterwards, however, when woke up was in severe pain. He thus came to ED. Patient also mentions starting Crizanlizumab on 10/1/22, and afterwards he developed insomnia, jitteriness, and LE swelling.     In ED v/s: /77, HR 80, RR18, T 97.9, SPO2: 94% on RA (07 Oct 2022 03:10)    Patient diagnose with sickle cell crisis. Pain consulted for joint pain on lower back and bilateral legs. Patient seen by pain management in 8/2022. Pt prescribed oxycodone 30mg PO q6h PRN per Istop. Pt with anemia, h/h 5.0/13.8 (10/7). Receiving 1 unit PRBC in ED. Retic % 22 (10/7). Pt seen and examined at bedside in ED this morning. Patient found sitting in bed, awake, alert and oriented x 3, no acute distress noted, using Iphone while interview, family member at bedside. Patient reports joint pain on lower back and bilateral legs. Rating score 8/10 over lower back, patient stating just medicated with Dilaudid IV with some relief, pain was 10/10 before. SCALE USED: (1-10 VNRS). Pt describes pain as constant, sharp, non- radiating, alleviated by pain medication, exacerbated by movement. Reports experiencing crisis every 2 months, denies known triggers. Pt tolerating PO diet. Denies lethargy, chest pain, SOB, nausea, vomiting, constipation. Reports last BM 10/6. Patient stated goal for pain control: to be able to take deep breaths, get out of bed to chair and ambulate with tolerable pain control. Pt is opioid dependent, reports taking oxycodone 30mg PO more often than prescribed q4h PRN and as needed which helps control his pain, baseline 2/10 with Oxycodone. Patient reports stop taking Hydroxyurea years ago due to  problems which resolved once stop taking it.    PAST MEDICAL & SURGICAL HISTORY:  Sickle cell anemia    Gout    History of cholecystectomy    FAMILY HISTORY:  Family history of sickle cell trait (Father, Mother)    Social History:   [x] Denies ETOH use, illicit drug use and smoking  [X] daily marijuana smoking    Allergies    ceftriaxone (Anaphylaxis)  hydroxyurea (Other)  penicillin (Pruritus)  piperacillin-tazobactam (Urticaria)    Intolerances    MEDICATIONS  (STANDING):  allopurinol 100 milliGRAM(s) Oral daily  enoxaparin Injectable 40 milliGRAM(s) SubCutaneous every 24 hours  HYDROmorphone  Injectable 2 milliGRAM(s) IV Push every 4 hours  lactated ringers. 1000 milliLiter(s) (100 mL/Hr) IV Continuous <Continuous>  polyethylene glycol 3350 17 Gram(s) Oral daily    MEDICATIONS  (PRN):  oxyCODONE    IR 30 milliGRAM(s) Oral every 8 hours PRN Moderate Pain (4 - 6)    Vital Signs Last 24 Hrs  T(C): 36.4 (07 Oct 2022 11:45), Max: 36.9 (06 Oct 2022 21:48)  T(F): 97.6 (07 Oct 2022 11:45), Max: 98.4 (06 Oct 2022 21:48)  HR: 70 (07 Oct 2022 11:45) (70 - 112)  BP: 146/80 (07 Oct 2022 11:45) (120/72 - 148/72)  BP(mean): --  RR: 16 (07 Oct 2022 11:45) (16 - 19)  SpO2: 95% (07 Oct 2022 11:45) (92% - 95%)    Parameters below as of 07 Oct 2022 11:45  Patient On (Oxygen Delivery Method): room air    LABS: Reviewed.                        5.0    14.81 )-----------( 277      ( 07 Oct 2022 00:35 )             13.8     10-07    142  |  111<H>  |  23<H>  ----------------------------<  106<H>  4.4   |  25  |  1.11    Ca    8.9      07 Oct 2022 14:09  Phos  4.3     10-07  Mg     2.2     10-07    TPro  7.2  /  Alb  3.3<L>  /  TBili  5.7<H>  /  DBili  x   /  AST  100<H>  /  ALT  54  /  AlkPhos  154<H>  10-07    PT/INR - ( 07 Oct 2022 00:35 )   PT: 12.9 sec;   INR: 1.08 ratio       PTT - ( 07 Oct 2022 00:35 )  PTT:74.6 sec  LIVER FUNCTIONS - ( 07 Oct 2022 14:09 )  Alb: 3.3 g/dL / Pro: 7.2 g/dL / ALK PHOS: 154 U/L / ALT: 54 U/L DA / AST: 100 U/L / GGT: x           CAPILLARY BLOOD GLUCOSE    COVID-19 PCR: NotDetec (07 Oct 2022 01:46)  COVID-19 PCR: NotDetec (15 Aug 2022 02:57)  COVID-19 PCR: NotDetec (31 May 2022 02:47)    Radiology: Reviewed.   ACC: [id number] EXAM:  XR CHEST PORTABLE IMMED 1V                          PROCEDURE DATE:  08/15/2022      INTERPRETATION:  PROCEDURE: AP view of the chest.    CLINICAL INFORMATION: Sickle cell crisis.    COMPARISON: 6/23/2022.    FINDINGS:    Right chest medication port is noted.    Lungs: There are bilateral lung opacities.  Heart: There is cardiomegaly.  Mediastinum: The mediastinum is within normal limits.    IMPRESSION:    Bilateral lung opacities, suspicious for pneumonia.    --- End of Report ---    MEKHI ADHIKARI MD; Attending Radiologist  This document has been electronically signed. Aug 15 2022  7:25AM      ORT Score -   Family Hx of substance abuse	Female	      Male  Alcohol 	                                           1                     3  Illegal drugs	                                   2                     3  Rx drugs                                           4 	                  4  Personal Hx of substance abuse		  Alcohol 	                                          3	                  3  Illegal drugs                                     4	                  4  Rx drugs                                            5 	                  5  Age between 16- 45 years	           1                     1  hx preadolescent sexual abuse	   3 	                  0  Psychological disease		  ADD, OCD, bipolar, schizophrenia   2	          2  Depression                                           1 	          1  Total: 1    a score of 3 or lower indicates low risk for opioid abuse		  a score of 4-7 indicates moderate risk for opioid abuse		  a score of 8 or higher indicates high risk for opioid abuse  	  REVIEW OF SYSTEMS:  CONSTITUTIONAL: No fever or fatigue  HEENT:  No difficulty hearing, no change in vision  NECK: No pain or stiffness  RESPIRATORY: No cough, wheezing, chills or hemoptysis; No shortness of breath  CARDIOVASCULAR: No chest pain, palpitations, dizziness, or leg swelling  GASTROINTESTINAL: No loss of appetite, decreased PO intake. No abdominal or epigastric pain. No nausea, vomiting; No diarrhea or constipation.   GENITOURINARY: No dysuria, frequency, hematuria, retention or incontinence  MUSCULOSKELETAL: + generalized joint pain, greatest over lower back and b/l LE pain; no upper or lower motor strength weakness, no saddle anesthesia, bowel/bladder incontinence, no falls   NEURO: No headaches, no numbness/tingling b/l LE, No weakness  ENDOCRINE: No polyuria, polydipsia, heat or cold intolerance; No hair loss  PSYCHIATRIC: No depression, anxiety or difficulty sleeping    PHYSICAL EXAM:  GENERAL:  Alert & Oriented X4, cooperative, NAD, Good concentration. Speech is clear.   RESPIRATORY: Respirations even and unlabored. Clear to auscultation bilaterally; No rales, rhonchi, wheezing, or rubs  CARDIOVASCULAR: Normal S1/S2, regular rate and rhythm; No murmurs, rubs, or gallops. No JVD.   GASTROINTESTINAL:  Soft, Nontender, Nondistended; Bowel sounds present  PERIPHERAL VASCULAR:  Extremities warm without edema. 2+ Peripheral Pulses, No cyanosis, No calf tenderness  MUSCULOSKELETAL: Motor Strength 5/5 B/L upper and lower extremities; moves all extremities equally against gravity; ROM intact; + generalized joint tenderness lower back and BLE   SKIN: +icterus; sking warm, dry, intact. No rashes, lesions, scars or wounds. +Right chest port in place with dressing c/d/i    Risk factors associated with adverse outcomes related to opioid treatment  [ ]  Concurrent benzodiazepine use  [ ]  History/ Active substance use or alcohol use disorder  [ ] Psychiatric co-morbidity  [ ] Sleep apnea  [ ] COPD  [ ] BMI> 35  [x] Liver dysfunction  [x ] Renal dysfunction  [ ] CHF  [x] Smoker-marihuana  [ ]  Age > 60 years    [x]  [place]  Reviewed and Copied to Chart. See below.    Plan of care and goal oriented pain management treatment options were discussed with patient and /or primary care giver; all questions and concerns were addressed and care was aligned with patient's wishes.    Educated patient on goal oriented pain management treatment options        Source of information: TOLU VARGAS, Chart review  Patient language: English  : n/a    HPI:  This is a 41 yo M PMHx sickle cell disease, gout and cholecystectomy who presents with worsening anemia and diffuse abdominal pain. Patient states he was seen by Dr. Jordan on 10/6, and blood work revealed he had worsening anemia, Dr. Jordan told patient to present to ED to receive 2U of PRBCs. However, patient went home afterwards, however, when woke up was in severe pain. He thus came to ED. Patient also mentions starting Crizanlizumab on 10/1/22, and afterwards he developed insomnia, jitteriness, and LE swelling.     In ED v/s: /77, HR 80, RR18, T 97.9, SPO2: 94% on RA (07 Oct 2022 03:10)    Patient diagnose with sickle cell crisis. Pain consulted for joint pain on lower back and bilateral legs. Patient seen by pain management in 8/2022. Pt prescribed oxycodone 30mg PO q4h PRN per Istop. Pt with anemia, h/h 5.0/13.8 (10/7). Receiving 1 unit PRBC in ED. Retic % 22 (10/7). Pt seen and examined at bedside in ED this morning. Patient found sitting in bed, awake, alert and oriented x 3, no acute distress noted, using Iphone while interview, family member at bedside. Patient reports joint pain on lower back and bilateral legs. Rating score 8/10 over lower back, patient stating just medicated with Dilaudid IV with some relief, pain was 10/10 before. SCALE USED: (1-10 VNRS). Pt describes pain as constant, sharp, non- radiating, alleviated by pain medication, exacerbated by movement. Reports experiencing crisis every 2 months, denies known triggers. Pt tolerating PO diet. Denies lethargy, chest pain, SOB, nausea, vomiting, constipation. Reports last BM 10/6. Patient stated goal for pain control: to be able to take deep breaths, get out of bed to chair and ambulate with tolerable pain control. Pt is opioid dependent, reports taking oxycodone 30mg PO more often than prescribed q4h PRN and as needed which helps control his pain, baseline 2/10 with Oxycodone. Patient reports stop taking Hydroxyurea years ago due to  problems which resolved once stop taking it.    PAST MEDICAL & SURGICAL HISTORY:  Sickle cell anemia    Gout    History of cholecystectomy    FAMILY HISTORY:  Family history of sickle cell trait (Father, Mother)    Social History:   [x] Denies ETOH use, illicit drug use and smoking  [X] daily marijuana smoking    Allergies    ceftriaxone (Anaphylaxis)  hydroxyurea (Other)  penicillin (Pruritus)  piperacillin-tazobactam (Urticaria)    Intolerances    MEDICATIONS  (STANDING):  allopurinol 100 milliGRAM(s) Oral daily  enoxaparin Injectable 40 milliGRAM(s) SubCutaneous every 24 hours  HYDROmorphone  Injectable 2 milliGRAM(s) IV Push every 4 hours  lactated ringers. 1000 milliLiter(s) (100 mL/Hr) IV Continuous <Continuous>  polyethylene glycol 3350 17 Gram(s) Oral daily    MEDICATIONS  (PRN):  oxyCODONE    IR 30 milliGRAM(s) Oral every 8 hours PRN Moderate Pain (4 - 6)    Vital Signs Last 24 Hrs  T(C): 36.4 (07 Oct 2022 11:45), Max: 36.9 (06 Oct 2022 21:48)  T(F): 97.6 (07 Oct 2022 11:45), Max: 98.4 (06 Oct 2022 21:48)  HR: 70 (07 Oct 2022 11:45) (70 - 112)  BP: 146/80 (07 Oct 2022 11:45) (120/72 - 148/72)  BP(mean): --  RR: 16 (07 Oct 2022 11:45) (16 - 19)  SpO2: 95% (07 Oct 2022 11:45) (92% - 95%)    Parameters below as of 07 Oct 2022 11:45  Patient On (Oxygen Delivery Method): room air    LABS: Reviewed.                        5.0    14.81 )-----------( 277      ( 07 Oct 2022 00:35 )             13.8     10-07    142  |  111<H>  |  23<H>  ----------------------------<  106<H>  4.4   |  25  |  1.11    Ca    8.9      07 Oct 2022 14:09  Phos  4.3     10-07  Mg     2.2     10-07    TPro  7.2  /  Alb  3.3<L>  /  TBili  5.7<H>  /  DBili  x   /  AST  100<H>  /  ALT  54  /  AlkPhos  154<H>  10-07    PT/INR - ( 07 Oct 2022 00:35 )   PT: 12.9 sec;   INR: 1.08 ratio       PTT - ( 07 Oct 2022 00:35 )  PTT:74.6 sec  LIVER FUNCTIONS - ( 07 Oct 2022 14:09 )  Alb: 3.3 g/dL / Pro: 7.2 g/dL / ALK PHOS: 154 U/L / ALT: 54 U/L DA / AST: 100 U/L / GGT: x           CAPILLARY BLOOD GLUCOSE    COVID-19 PCR: NotDetec (07 Oct 2022 01:46)  COVID-19 PCR: NotDetec (15 Aug 2022 02:57)  COVID-19 PCR: NotDetec (31 May 2022 02:47)    Radiology: Reviewed.   ACC: [id number] EXAM:  XR CHEST PORTABLE IMMED 1V                          PROCEDURE DATE:  08/15/2022      INTERPRETATION:  PROCEDURE: AP view of the chest.    CLINICAL INFORMATION: Sickle cell crisis.    COMPARISON: 6/23/2022.    FINDINGS:    Right chest medication port is noted.    Lungs: There are bilateral lung opacities.  Heart: There is cardiomegaly.  Mediastinum: The mediastinum is within normal limits.    IMPRESSION:    Bilateral lung opacities, suspicious for pneumonia.    --- End of Report ---    MEKHI ADHIKARI MD; Attending Radiologist  This document has been electronically signed. Aug 15 2022  7:25AM      ORT Score -   Family Hx of substance abuse	Female	      Male  Alcohol 	                                           1                     3  Illegal drugs	                                   2                     3  Rx drugs                                           4 	                  4  Personal Hx of substance abuse		  Alcohol 	                                          3	                  3  Illegal drugs                                     4	                  4  Rx drugs                                            5 	                  5  Age between 16- 45 years	           1                     1  hx preadolescent sexual abuse	   3 	                  0  Psychological disease		  ADD, OCD, bipolar, schizophrenia   2	          2  Depression                                           1 	          1  Total: 1    a score of 3 or lower indicates low risk for opioid abuse		  a score of 4-7 indicates moderate risk for opioid abuse		  a score of 8 or higher indicates high risk for opioid abuse  	  REVIEW OF SYSTEMS:  CONSTITUTIONAL: No fever or fatigue  HEENT:  No difficulty hearing, no change in vision  NECK: No pain or stiffness  RESPIRATORY: No cough, wheezing, chills or hemoptysis; No shortness of breath  CARDIOVASCULAR: No chest pain, palpitations, dizziness, or leg swelling  GASTROINTESTINAL: No loss of appetite, decreased PO intake. No abdominal or epigastric pain. No nausea, vomiting; No diarrhea or constipation.   GENITOURINARY: No dysuria, frequency, hematuria, retention or incontinence  MUSCULOSKELETAL: + generalized joint pain, greatest over lower back and b/l LE pain; no upper or lower motor strength weakness, no saddle anesthesia, bowel/bladder incontinence, no falls   NEURO: No headaches, no numbness/tingling b/l LE, No weakness  ENDOCRINE: No polyuria, polydipsia, heat or cold intolerance; No hair loss  PSYCHIATRIC: No depression, anxiety or difficulty sleeping    PHYSICAL EXAM:  GENERAL:  Alert & Oriented X4, cooperative, NAD, Good concentration. Speech is clear.   RESPIRATORY: Respirations even and unlabored. Clear to auscultation bilaterally; No rales, rhonchi, wheezing, or rubs  CARDIOVASCULAR: Normal S1/S2, regular rate and rhythm; No murmurs, rubs, or gallops. No JVD.   GASTROINTESTINAL:  Soft, Nontender, Nondistended; Bowel sounds present  PERIPHERAL VASCULAR:  Extremities warm without edema. 2+ Peripheral Pulses, No cyanosis, No calf tenderness  MUSCULOSKELETAL: Motor Strength 5/5 B/L upper and lower extremities; moves all extremities equally against gravity; ROM intact; + generalized joint tenderness lower back and BLE   SKIN: +icterus; sking warm, dry, intact. No rashes, lesions, scars or wounds. +Right chest port in place with dressing c/d/i    Risk factors associated with adverse outcomes related to opioid treatment  [ ]  Concurrent benzodiazepine use  [ ]  History/ Active substance use or alcohol use disorder  [ ] Psychiatric co-morbidity  [ ] Sleep apnea  [ ] COPD  [ ] BMI> 35  [x] Liver dysfunction  [x ] Renal dysfunction  [ ] CHF  [x] Smoker-marihuana  [ ]  Age > 60 years    [x]  [place]  Reviewed and Copied to Chart. See below.    Plan of care and goal oriented pain management treatment options were discussed with patient and /or primary care giver; all questions and concerns were addressed and care was aligned with patient's wishes.    Educated patient on goal oriented pain management treatment options

## 2022-10-07 NOTE — CONSULT NOTE ADULT - ASSESSMENT
Confidential Drug Utilization Report  Search Terms: Alex Downey, 1980Search Date: 10/07/2022 15:10:55 PM  The Drug Utilization Report below displays all of the controlled substance prescriptions, if any, that your patient has filled in the last twelve months. The information displayed on this report is compiled from pharmacy submissions to the Department, and accurately reflects the information as submitted by the pharmacies.    This report was requested by: Beata Alfaro | Reference #: [id number]    You have not added a RAFAELA number. Keeping your RAFAELA number(s) up to date on the My RAFAELA # page will enable the separation of your prescriptions from others in the search results.    Others' Prescriptions  Patient Name: Alex DowneyBirth Date: 1980  Address: [address] [place] [id number]: Male  Rx Written	Rx Dispensed	Drug	Quantity	Days Supply	Prescriber Name	Prescriber Rafaela #	Payment Method	Dispenser  09/08/2022	09/09/2022	oxycodone hcl (ir) 30 mg tab	180	30	Jordan, Shirley SOLITARIO	[id number]	Insurance	[facility]   08/11/2022	08/12/2022	oxycodone hcl (ir) 30 mg tab	180	30	Jordan, Shirley SOLITARIO	[id number]	Insurance	[facility]   07/14/2022	07/15/2022	oxycodone hcl (ir) 30 mg tab	180	30	Jordan, Shirley SOLITARIO	[id number]	Insurance	[facility]   06/16/2022	06/16/2022	oxycodone hcl (ir) 30 mg tab	180	30	Jordan, Shirley SOLITARIO	[id number]	Insurance	[facility]   05/19/2022	05/20/2022	oxycodone hcl (ir) 30 mg tab	180	30	Jordan, Shirley SOLITARIO	[id number]	Insurance	[facility]   04/21/2022	04/22/2022	oxycodone hcl (ir) 30 mg tab	180	30	Jordan, Shirley SOLITARIO	[id number]	Insurance	[facility]   03/24/2022	03/25/2022	oxycodone hcl (ir) 30 mg tab	180	30	Jordan, Shirley SOLITARIO	[id number]	Insurance	[facility]   02/24/2022	02/24/2022	oxycodone hcl (ir) 30 mg tab	180	30	Shirley Jordan MD	[id number]	Insurance	[facility]   12/30/2021	12/30/2021	oxycodone hcl (ir) 30 mg tab	180	30	Darrion Barron	[id number]	Insurance	[facility]   12/02/2021	12/02/2021	oxycodone hcl (ir) 30 mg tab	180	30	JordanShirley MD	[id number]	Insurance	[facility]   11/04/2021	11/05/2021	oxycodone hcl 30 mg tablet	180	30	Jordan, Shirley SOLITARIO	[id number]	Insurance	[facility]   * - Drugs marked with an asterisk are compound drugs. If the compound drug is made up of more than one controlled substance, then each controlled substance will be a separate row in the table.    
42 year old male with SCD presented with swollen ankle, fatigue, and severe anemia.  His baseline Hb is around 6.  no fever or chiss.  Pain is as usual    1. sickle cell anemia  no evidence of infection or crisis  transfuse to Hb 6    2. swollen ankles, bilat  thie can be from anemia, ?CHF, ?renal cause  will check UA  will give lasix post transfusion

## 2022-10-07 NOTE — DISCHARGE NOTE PROVIDER - NSDCCPCAREPLAN_GEN_ALL_CORE_FT
PRINCIPAL DISCHARGE DIAGNOSIS  Diagnosis: Anemia, sickle cell with crisis  Assessment and Plan of Treatment: Blood transfusion - 2U pRBC; Pain management

## 2022-10-07 NOTE — DISCHARGE NOTE PROVIDER - CARE PROVIDER_API CALL
Shirley Jordan  Out patient hematology/oncologuy clinic  Phone: (   )    -  Fax: (   )    -  Established Patient  Follow Up Time: 2 weeks

## 2022-10-07 NOTE — H&P ADULT - PROBLEM SELECTOR PLAN 2
Pt presenting with LE edema, after taking Crizanlizumab  will continue to give IVF in setting of Sickle cell crisis

## 2022-10-07 NOTE — DISCHARGE NOTE PROVIDER - NSDCMRMEDTOKEN_GEN_ALL_CORE_FT
allopurinol 100 mg oral tablet: 1 tab(s) orally once a day  crizanlizumab: 500 milligram(s) intravenous once a day  folic acid 1 mg oral tablet: 1 tab(s) orally 2 times a day  oxyCODONE 30 mg oral tablet: 1 tab(s) orally every 8 hours, As Needed  polyethylene glycol 3350 oral powder for reconstitution: 17 gram(s) orally once a day, As Needed - for constipation    allopurinol 100 mg oral tablet: 1 tab(s) orally once a day  folic acid 1 mg oral tablet: 1 tab(s) orally 2 times a day  oxyCODONE 30 mg oral tablet: 1 tab(s) orally every 8 hours, As Needed  polyethylene glycol 3350 oral powder for reconstitution: 17 gram(s) orally once a day, As Needed - for constipation

## 2022-10-07 NOTE — ED ADULT NURSE NOTE - OBJECTIVE STATEMENT
Pt presents to the ED with c/o generalized body pain and was sent by PMD for blood transfusion. H/o sickle cell.

## 2022-10-07 NOTE — DISCHARGE NOTE PROVIDER - HOSPITAL COURSE
41 yo M PMHx sickle cell disease, gout and cholecystectomy presents with worsening anemia and diffuse abdominal pain. Patient states he was seen by Dr. Jordan on 10/6, and blood work revealed he had worsening anemia. Dr. Jordan told patient to present to ED to receive 2U of PRBCs. However, patient went home afterwards, however, when woke up was in severe pain. He thus came to ED. In ED his  H/H was 5.0/13.8.  Pt was transfused with 2 UpRBC. with Lasix 20mg, IVP after the 1st unit of blood transfusion. His post transfusion  H/H was 7.1/20.0. Goal of hemoglobin  is >6. While pt was hospitalized hem/onc , Dr Jordan & pain management were consulted. Pt continued to receive IVF due to sickle cell crises.  ·  Problem: Prophylactic measure.   ·  Plan: Lovenox for DVT ppx.             41 yo M PMHx sickle cell disease, gout and cholecystectomy presents with worsening anemia and diffuse abdominal pain. Patient states he was seen by Dr. Jordan on 10/6, and blood work revealed he had worsening anemia. Dr. Jordan told patient to present to ED to receive 2U of PRBCs. However, patient went home afterwards, however, when woke up was in severe pain. He thus came to ED. In ED his  H/H was 5.0/13.8.  Pt was transfused with 2 UpRBC. with Lasix 20mg, IVP after the 1st unit of blood transfusion. His post transfusion  H/H was 7.1/20.0. Goal of hemoglobin  is >6. While pt was hospitalized hem/onc , Dr Jordan & pain management were consulted. Pt continued to receive IVF due to sickle cell crises. Pain well controlled with pain medication. Tolerating diet. VSS. 43 yo M PMHx sickle cell disease, gout and cholecystectomy presents with worsening anemia and diffuse abdominal pain. Patient states he was seen by Dr. Jordan on 10/6, and blood work revealed he had worsening anemia. Dr. Jordan told patient to present to ED to receive 2U of PRBCs. However, patient went home afterwards, however, when woke up was in severe pain. He thus came to ED. In ED his  H/H was 5.0/13.8.  Pt was transfused with 2 UpRBC. with Lasix 20mg, IVP after the 1st unit of blood transfusion. His post transfusion  H/H was 7.1/20.0. Goal of hemoglobin  is >6. While pt was hospitalized hem/onc , Dr Jordan & pain management were consulted. Pt continued to receive IVF due to sickle cell crises. Pain well controlled with pain medication. Tolerating diet. Pt stated he has his pain medications at home. VSS..

## 2023-01-06 ENCOUNTER — INPATIENT (INPATIENT)
Facility: HOSPITAL | Age: 43
LOS: 1 days | Discharge: HOME | DRG: 812 | End: 2023-01-08
Attending: STUDENT IN AN ORGANIZED HEALTH CARE EDUCATION/TRAINING PROGRAM | Admitting: STUDENT IN AN ORGANIZED HEALTH CARE EDUCATION/TRAINING PROGRAM
Payer: MEDICAID

## 2023-01-06 VITALS
WEIGHT: 195.11 LBS | SYSTOLIC BLOOD PRESSURE: 122 MMHG | HEART RATE: 103 BPM | TEMPERATURE: 99 F | DIASTOLIC BLOOD PRESSURE: 67 MMHG | RESPIRATION RATE: 18 BRPM | OXYGEN SATURATION: 90 %

## 2023-01-06 DIAGNOSIS — R74.01 ELEVATION OF LEVELS OF LIVER TRANSAMINASE LEVELS: ICD-10-CM

## 2023-01-06 DIAGNOSIS — F11.20 OPIOID DEPENDENCE, UNCOMPLICATED: ICD-10-CM

## 2023-01-06 DIAGNOSIS — Z29.9 ENCOUNTER FOR PROPHYLACTIC MEASURES, UNSPECIFIED: ICD-10-CM

## 2023-01-06 DIAGNOSIS — D57.00 HB-SS DISEASE WITH CRISIS, UNSPECIFIED: ICD-10-CM

## 2023-01-06 DIAGNOSIS — N17.9 ACUTE KIDNEY FAILURE, UNSPECIFIED: ICD-10-CM

## 2023-01-06 DIAGNOSIS — M10.9 GOUT, UNSPECIFIED: ICD-10-CM

## 2023-01-06 DIAGNOSIS — Z90.49 ACQUIRED ABSENCE OF OTHER SPECIFIED PARTS OF DIGESTIVE TRACT: Chronic | ICD-10-CM

## 2023-01-06 LAB
ALBUMIN SERPL ELPH-MCNC: 3.5 G/DL — SIGNIFICANT CHANGE UP (ref 3.5–5)
ALP SERPL-CCNC: 130 U/L — HIGH (ref 40–120)
ALT FLD-CCNC: 71 U/L DA — HIGH (ref 10–60)
ANION GAP SERPL CALC-SCNC: 8 MMOL/L — SIGNIFICANT CHANGE UP (ref 5–17)
ANISOCYTOSIS BLD QL: SLIGHT — SIGNIFICANT CHANGE UP
APTT BLD: 31.6 SEC — SIGNIFICANT CHANGE UP (ref 27.5–35.5)
AST SERPL-CCNC: 137 U/L — HIGH (ref 10–40)
BASOPHILS # BLD AUTO: 0.08 K/UL — SIGNIFICANT CHANGE UP (ref 0–0.2)
BASOPHILS NFR BLD AUTO: 0.7 % — SIGNIFICANT CHANGE UP (ref 0–2)
BILIRUB SERPL-MCNC: 5.9 MG/DL — HIGH (ref 0.2–1.2)
BLD GP AB SCN SERPL QL: SIGNIFICANT CHANGE UP
BUN SERPL-MCNC: 34 MG/DL — HIGH (ref 7–18)
BURR CELLS BLD QL SMEAR: PRESENT — SIGNIFICANT CHANGE UP
CALCIUM SERPL-MCNC: 8.7 MG/DL — SIGNIFICANT CHANGE UP (ref 8.4–10.5)
CHLORIDE SERPL-SCNC: 109 MMOL/L — HIGH (ref 96–108)
CO2 SERPL-SCNC: 22 MMOL/L — SIGNIFICANT CHANGE UP (ref 22–31)
CREAT SERPL-MCNC: 1.46 MG/DL — HIGH (ref 0.5–1.3)
EGFR: 61 ML/MIN/1.73M2 — SIGNIFICANT CHANGE UP
ELLIPTOCYTES BLD QL SMEAR: SLIGHT — SIGNIFICANT CHANGE UP
EOSINOPHIL # BLD AUTO: 0.7 K/UL — HIGH (ref 0–0.5)
EOSINOPHIL NFR BLD AUTO: 5.8 % — SIGNIFICANT CHANGE UP (ref 0–6)
FLUAV AG NPH QL: SIGNIFICANT CHANGE UP
FLUBV AG NPH QL: SIGNIFICANT CHANGE UP
GLUCOSE SERPL-MCNC: 119 MG/DL — HIGH (ref 70–99)
HCT VFR BLD CALC: 13.8 % — CRITICAL LOW (ref 39–50)
HCT VFR BLD CALC: 19.6 % — CRITICAL LOW (ref 39–50)
HGB BLD-MCNC: 5 G/DL — CRITICAL LOW (ref 13–17)
HGB BLD-MCNC: 7 G/DL — CRITICAL LOW (ref 13–17)
HYPOCHROMIA BLD QL: SLIGHT — SIGNIFICANT CHANGE UP
IMM GRANULOCYTES NFR BLD AUTO: 0.7 % — SIGNIFICANT CHANGE UP (ref 0–0.9)
INR BLD: 1.13 RATIO — SIGNIFICANT CHANGE UP (ref 0.88–1.16)
LDH SERPL L TO P-CCNC: 1021 U/L — HIGH (ref 120–225)
LYMPHOCYTES # BLD AUTO: 36.4 % — SIGNIFICANT CHANGE UP (ref 13–44)
LYMPHOCYTES # BLD AUTO: 4.43 K/UL — HIGH (ref 1–3.3)
MANUAL SMEAR VERIFICATION: SIGNIFICANT CHANGE UP
MCHC RBC-ENTMCNC: 32.4 PG — SIGNIFICANT CHANGE UP (ref 27–34)
MCHC RBC-ENTMCNC: 34 PG — SIGNIFICANT CHANGE UP (ref 27–34)
MCHC RBC-ENTMCNC: 35.7 GM/DL — SIGNIFICANT CHANGE UP (ref 32–36)
MCHC RBC-ENTMCNC: 36.2 GM/DL — HIGH (ref 32–36)
MCV RBC AUTO: 90.7 FL — SIGNIFICANT CHANGE UP (ref 80–100)
MCV RBC AUTO: 93.9 FL — SIGNIFICANT CHANGE UP (ref 80–100)
MONOCYTES # BLD AUTO: 1.7 K/UL — HIGH (ref 0–0.9)
MONOCYTES NFR BLD AUTO: 14 % — SIGNIFICANT CHANGE UP (ref 2–14)
NEUTROPHILS # BLD AUTO: 5.17 K/UL — SIGNIFICANT CHANGE UP (ref 1.8–7.4)
NEUTROPHILS NFR BLD AUTO: 42.4 % — LOW (ref 43–77)
NRBC # BLD: 1 /100 WBCS — HIGH (ref 0–0)
NRBC # BLD: 1 /100 WBCS — HIGH (ref 0–0)
NT-PROBNP SERPL-SCNC: 261 PG/ML — HIGH (ref 0–125)
OVALOCYTES BLD QL SMEAR: SLIGHT — SIGNIFICANT CHANGE UP
PLAT MORPH BLD: NORMAL — SIGNIFICANT CHANGE UP
PLATELET # BLD AUTO: 236 K/UL — SIGNIFICANT CHANGE UP (ref 150–400)
PLATELET # BLD AUTO: 263 K/UL — SIGNIFICANT CHANGE UP (ref 150–400)
PLATELET COUNT - ESTIMATE: NORMAL — SIGNIFICANT CHANGE UP
POIKILOCYTOSIS BLD QL AUTO: SLIGHT — SIGNIFICANT CHANGE UP
POTASSIUM SERPL-MCNC: 4.7 MMOL/L — SIGNIFICANT CHANGE UP (ref 3.5–5.3)
POTASSIUM SERPL-SCNC: 4.7 MMOL/L — SIGNIFICANT CHANGE UP (ref 3.5–5.3)
PROT SERPL-MCNC: 7.5 G/DL — SIGNIFICANT CHANGE UP (ref 6–8.3)
PROTHROM AB SERPL-ACNC: 13.5 SEC — HIGH (ref 10.5–13.4)
RBC # BLD: 1.47 M/UL — LOW (ref 4.2–5.8)
RBC # BLD: 1.47 M/UL — LOW (ref 4.2–5.8)
RBC # BLD: 2.16 M/UL — LOW (ref 4.2–5.8)
RBC # FLD: 22.1 % — HIGH (ref 10.3–14.5)
RBC # FLD: 25.3 % — HIGH (ref 10.3–14.5)
RBC BLD AUTO: ABNORMAL
RETICS #: 304.7 K/UL — HIGH (ref 25–125)
RETICS/RBC NFR: 20.7 % — HIGH (ref 0.5–2.5)
SARS-COV-2 RNA SPEC QL NAA+PROBE: SIGNIFICANT CHANGE UP
SCHISTOCYTES BLD QL AUTO: SLIGHT — SIGNIFICANT CHANGE UP
SICKLE CELLS BLD QL SMEAR: SLIGHT — SIGNIFICANT CHANGE UP
SODIUM SERPL-SCNC: 139 MMOL/L — SIGNIFICANT CHANGE UP (ref 135–145)
TROPONIN I, HIGH SENSITIVITY RESULT: 9.9 NG/L — SIGNIFICANT CHANGE UP
WBC # BLD: 10.34 K/UL — SIGNIFICANT CHANGE UP (ref 3.8–10.5)
WBC # BLD: 12.16 K/UL — HIGH (ref 3.8–10.5)
WBC # FLD AUTO: 10.34 K/UL — SIGNIFICANT CHANGE UP (ref 3.8–10.5)
WBC # FLD AUTO: 12.16 K/UL — HIGH (ref 3.8–10.5)

## 2023-01-06 PROCEDURE — 99222 1ST HOSP IP/OBS MODERATE 55: CPT

## 2023-01-06 PROCEDURE — 99285 EMERGENCY DEPT VISIT HI MDM: CPT

## 2023-01-06 PROCEDURE — 71046 X-RAY EXAM CHEST 2 VIEWS: CPT | Mod: 26

## 2023-01-06 PROCEDURE — 70450 CT HEAD/BRAIN W/O DYE: CPT | Mod: 26,MA

## 2023-01-06 PROCEDURE — 99223 1ST HOSP IP/OBS HIGH 75: CPT | Mod: GC

## 2023-01-06 RX ORDER — DIPHENHYDRAMINE HCL 50 MG
50 CAPSULE ORAL ONCE
Refills: 0 | Status: COMPLETED | OUTPATIENT
Start: 2023-01-06 | End: 2023-01-06

## 2023-01-06 RX ORDER — HYDROMORPHONE HYDROCHLORIDE 2 MG/ML
2 INJECTION INTRAMUSCULAR; INTRAVENOUS; SUBCUTANEOUS EVERY 4 HOURS
Refills: 0 | Status: DISCONTINUED | OUTPATIENT
Start: 2023-01-06 | End: 2023-01-06

## 2023-01-06 RX ORDER — ACETAMINOPHEN 500 MG
1000 TABLET ORAL EVERY 8 HOURS
Refills: 0 | Status: DISCONTINUED | OUTPATIENT
Start: 2023-01-06 | End: 2023-01-08

## 2023-01-06 RX ORDER — HEPARIN SODIUM 5000 [USP'U]/ML
5000 INJECTION INTRAVENOUS; SUBCUTANEOUS EVERY 8 HOURS
Refills: 0 | Status: DISCONTINUED | OUTPATIENT
Start: 2023-01-06 | End: 2023-01-08

## 2023-01-06 RX ORDER — HYDROMORPHONE HYDROCHLORIDE 2 MG/ML
2 INJECTION INTRAMUSCULAR; INTRAVENOUS; SUBCUTANEOUS ONCE
Refills: 0 | Status: DISCONTINUED | OUTPATIENT
Start: 2023-01-06 | End: 2023-01-06

## 2023-01-06 RX ORDER — ACETAMINOPHEN 500 MG
975 TABLET ORAL ONCE
Refills: 0 | Status: COMPLETED | OUTPATIENT
Start: 2023-01-06 | End: 2023-01-06

## 2023-01-06 RX ORDER — SODIUM CHLORIDE 9 MG/ML
1000 INJECTION, SOLUTION INTRAVENOUS
Refills: 0 | Status: DISCONTINUED | OUTPATIENT
Start: 2023-01-06 | End: 2023-01-06

## 2023-01-06 RX ORDER — SODIUM CHLORIDE 9 MG/ML
1000 INJECTION INTRAMUSCULAR; INTRAVENOUS; SUBCUTANEOUS
Refills: 0 | Status: DISCONTINUED | OUTPATIENT
Start: 2023-01-06 | End: 2023-01-06

## 2023-01-06 RX ORDER — ACETAMINOPHEN 500 MG
650 TABLET ORAL EVERY 6 HOURS
Refills: 0 | Status: DISCONTINUED | OUTPATIENT
Start: 2023-01-06 | End: 2023-01-06

## 2023-01-06 RX ORDER — HYDROMORPHONE HYDROCHLORIDE 2 MG/ML
2 INJECTION INTRAMUSCULAR; INTRAVENOUS; SUBCUTANEOUS
Refills: 0 | Status: DISCONTINUED | OUTPATIENT
Start: 2023-01-06 | End: 2023-01-08

## 2023-01-06 RX ORDER — DIPHENHYDRAMINE HCL 50 MG
25 CAPSULE ORAL ONCE
Refills: 0 | Status: COMPLETED | OUTPATIENT
Start: 2023-01-06 | End: 2023-01-06

## 2023-01-06 RX ORDER — SODIUM CHLORIDE 9 MG/ML
1000 INJECTION, SOLUTION INTRAVENOUS
Refills: 0 | Status: DISCONTINUED | OUTPATIENT
Start: 2023-01-06 | End: 2023-01-08

## 2023-01-06 RX ORDER — SENNA PLUS 8.6 MG/1
2 TABLET ORAL AT BEDTIME
Refills: 0 | Status: DISCONTINUED | OUTPATIENT
Start: 2023-01-06 | End: 2023-01-08

## 2023-01-06 RX ORDER — POLYETHYLENE GLYCOL 3350 17 G/17G
17 POWDER, FOR SOLUTION ORAL DAILY
Refills: 0 | Status: DISCONTINUED | OUTPATIENT
Start: 2023-01-06 | End: 2023-01-08

## 2023-01-06 RX ORDER — LIDOCAINE 4 G/100G
1 CREAM TOPICAL DAILY
Refills: 0 | Status: DISCONTINUED | OUTPATIENT
Start: 2023-01-06 | End: 2023-01-08

## 2023-01-06 RX ADMIN — Medication 25 MILLIGRAM(S): at 14:05

## 2023-01-06 RX ADMIN — HYDROMORPHONE HYDROCHLORIDE 2 MILLIGRAM(S): 2 INJECTION INTRAMUSCULAR; INTRAVENOUS; SUBCUTANEOUS at 20:03

## 2023-01-06 RX ADMIN — Medication 50 MILLIGRAM(S): at 09:16

## 2023-01-06 RX ADMIN — HYDROMORPHONE HYDROCHLORIDE 2 MILLIGRAM(S): 2 INJECTION INTRAMUSCULAR; INTRAVENOUS; SUBCUTANEOUS at 05:37

## 2023-01-06 RX ADMIN — HYDROMORPHONE HYDROCHLORIDE 2 MILLIGRAM(S): 2 INJECTION INTRAMUSCULAR; INTRAVENOUS; SUBCUTANEOUS at 23:05

## 2023-01-06 RX ADMIN — SODIUM CHLORIDE 125 MILLILITER(S): 9 INJECTION, SOLUTION INTRAVENOUS at 22:07

## 2023-01-06 RX ADMIN — HYDROMORPHONE HYDROCHLORIDE 2 MILLIGRAM(S): 2 INJECTION INTRAMUSCULAR; INTRAVENOUS; SUBCUTANEOUS at 02:53

## 2023-01-06 RX ADMIN — HYDROMORPHONE HYDROCHLORIDE 2 MILLIGRAM(S): 2 INJECTION INTRAMUSCULAR; INTRAVENOUS; SUBCUTANEOUS at 21:03

## 2023-01-06 RX ADMIN — HYDROMORPHONE HYDROCHLORIDE 2 MILLIGRAM(S): 2 INJECTION INTRAMUSCULAR; INTRAVENOUS; SUBCUTANEOUS at 07:21

## 2023-01-06 RX ADMIN — Medication 975 MILLIGRAM(S): at 09:18

## 2023-01-06 RX ADMIN — SODIUM CHLORIDE 125 MILLILITER(S): 9 INJECTION INTRAMUSCULAR; INTRAVENOUS; SUBCUTANEOUS at 17:39

## 2023-01-06 RX ADMIN — HYDROMORPHONE HYDROCHLORIDE 2 MILLIGRAM(S): 2 INJECTION INTRAMUSCULAR; INTRAVENOUS; SUBCUTANEOUS at 15:54

## 2023-01-06 RX ADMIN — LIDOCAINE 1 PATCH: 4 CREAM TOPICAL at 19:59

## 2023-01-06 RX ADMIN — HYDROMORPHONE HYDROCHLORIDE 2 MILLIGRAM(S): 2 INJECTION INTRAMUSCULAR; INTRAVENOUS; SUBCUTANEOUS at 12:58

## 2023-01-06 RX ADMIN — HYDROMORPHONE HYDROCHLORIDE 2 MILLIGRAM(S): 2 INJECTION INTRAMUSCULAR; INTRAVENOUS; SUBCUTANEOUS at 16:30

## 2023-01-06 RX ADMIN — HYDROMORPHONE HYDROCHLORIDE 2 MILLIGRAM(S): 2 INJECTION INTRAMUSCULAR; INTRAVENOUS; SUBCUTANEOUS at 10:15

## 2023-01-06 RX ADMIN — HYDROMORPHONE HYDROCHLORIDE 2 MILLIGRAM(S): 2 INJECTION INTRAMUSCULAR; INTRAVENOUS; SUBCUTANEOUS at 02:58

## 2023-01-06 RX ADMIN — HYDROMORPHONE HYDROCHLORIDE 2 MILLIGRAM(S): 2 INJECTION INTRAMUSCULAR; INTRAVENOUS; SUBCUTANEOUS at 12:29

## 2023-01-06 RX ADMIN — HYDROMORPHONE HYDROCHLORIDE 2 MILLIGRAM(S): 2 INJECTION INTRAMUSCULAR; INTRAVENOUS; SUBCUTANEOUS at 09:17

## 2023-01-06 RX ADMIN — SENNA PLUS 2 TABLET(S): 8.6 TABLET ORAL at 23:05

## 2023-01-06 RX ADMIN — Medication 975 MILLIGRAM(S): at 10:15

## 2023-01-06 RX ADMIN — LIDOCAINE 1 PATCH: 4 CREAM TOPICAL at 14:55

## 2023-01-06 NOTE — H&P ADULT - PROBLEM SELECTOR PLAN 5
h/o gout takes allopurinol at home, with no recent flare ups  not in current exacerbation   c/w home meds

## 2023-01-06 NOTE — H&P ADULT - PROBLEM SELECTOR PLAN 2
h/o sickle cell disease with multiple hospitalizations for sickle cell pain crisis in the past  takes folic acid and oxycodone 30mg with little relief at home, unable to tolerate hydroxyurea d/t priapism   s/p Tylenol 975mg x1 and Dilaudid 2mg x2 in the ED   will start with Tylenol PRN for mild pain and Dilaudid 2mg q4 for breakthrough pain   Pain Management consulted

## 2023-01-06 NOTE — H&P ADULT - PROBLEM SELECTOR PLAN 3
p/w scleral icterus, denies noticed change. denies ETOH use, only on occasion   Bili 5.9 (baseline >5 Oct 2022)   /ALT 71   f/u RUQ US  f/u hepatitis panel  will continue to monitor for improvement p/w scleral icterus, denies noticed change. denies ETOH use, only on occasion   likely in the setting acute sickle cell crisis  Bili 5.9 (baseline >5 Oct 2022)   /ALT 71   f/u RUQ US  f/u hepatitis panel  will continue to monitor for improvement

## 2023-01-06 NOTE — ED PROVIDER NOTE - OBJECTIVE STATEMENT
42-year-old male with past medical history sickle cell disease, gout presents with generalized body pain today.  Patient reports diffuse body pain since this morning, reports pain in bilateral shoulders, elbows, knees, hips, low back, and ribs, states symptoms are consistent with past sickle cell crises.  Patient also reports bilateral frontal headache and dizziness today.  Patient states he followed up with his hematologist and was found to have hemoglobin 5.5 approximately 3 weeks ago, declined transfusion at this time.  Patient reports taking oxycodone 30 mg with little improvement.  Denies any additional complaints.

## 2023-01-06 NOTE — ED PROVIDER NOTE - CLINICAL SUMMARY MEDICAL DECISION MAKING FREE TEXT BOX
Bhavna: 42-year-old male with past medical history sickle cell disease, gout presents with generalized body pain today.  Patient reports diffuse body pain since this morning, reports pain in bilateral shoulders, elbows, knees, hips, low back, and ribs, states symptoms are consistent with past sickle cell crises.  Patient also reports bilateral frontal headache and dizziness today.  Patient states he followed up with his hematologist and was found to have hemoglobin 5.5 approximately 3 weeks ago, declined transfusion at this time.  Patient reports taking oxycodone 30 mg with little improvement.  History and exam consistent with sickle cell vasoocclusive crisis. No focal joint redness or swelling to suggest septic arthritis. No evidence of subarachnoid hemorrhage, intracranial bleed, meningitis, encephalitis, temporal arteritis, or intracranial mass. Will obtain labs r/o anemia, imaging r/o acute chest syndrome, supportive treatment with dispo pending workup.

## 2023-01-06 NOTE — H&P ADULT - NSHPPHYSICALEXAM_GEN_ALL_CORE
Vital Signs Last 24 Hrs  T(C): 36.8 (06 Jan 2023 09:45), Max: 37.3 (06 Jan 2023 01:37)  T(F): 98.2 (06 Jan 2023 09:45), Max: 99.2 (06 Jan 2023 01:37)  HR: 75 (06 Jan 2023 09:45) (75 - 103)  BP: 127/75 (06 Jan 2023 09:45) (112/68 - 139/93)  BP(mean): --  RR: 18 (06 Jan 2023 09:45) (18 - 18)  SpO2: 96% (06 Jan 2023 09:45) (90% - 99%)    Parameters below as of 06 Jan 2023 09:45  Patient On (Oxygen Delivery Method): room air    GENERAL: NAD, lying in bed comfortably  HEAD:  Atraumatic, Normocephalic  EYES: EOMI, PERRLA, conjunctiva and sclera clear  ENT: Moist mucous membranes  NECK: Supple, No JVD  CHEST/LUNG: Clear to auscultation bilaterally; No rales, rhonchi, wheezing, or rubs. Unlabored respirations  HEART: Regular rate and rhythm; No murmurs, rubs, or gallops  ABDOMEN: Bowel sounds present; Soft, Nontender, Nondistended. No hepatomegally  EXTREMITIES:  2+ Peripheral Pulses, brisk capillary refill. No clubbing, cyanosis, or edema  NERVOUS SYSTEM:  Alert & Oriented X3, speech clear. No deficits   MSK: FROM all 4 extremities, full and equal strength  SKIN: No rashes or lesions Vital Signs Last 24 Hrs  T(C): 36.8 (06 Jan 2023 09:45), Max: 37.3 (06 Jan 2023 01:37)  T(F): 98.2 (06 Jan 2023 09:45), Max: 99.2 (06 Jan 2023 01:37)  HR: 75 (06 Jan 2023 09:45) (75 - 103)  BP: 127/75 (06 Jan 2023 09:45) (112/68 - 139/93)  BP(mean): --  RR: 18 (06 Jan 2023 09:45) (18 - 18)  SpO2: 96% (06 Jan 2023 09:45) (90% - 99%)    Parameters below as of 06 Jan 2023 09:45  Patient On (Oxygen Delivery Method): room air    GENERAL: NAD, lying in bed comfortably  HEAD:  Atraumatic, Normocephalic  EYES: EOMI, PERRLA, (+) scleral icterus, new  ENT: Moist mucous membranes  NECK: Supple, No JVD  CHEST/LUNG: Clear to auscultation bilaterally; No rales, rhonchi, wheezing, or rubs. Unlabored respirations (+) right-sided chest port, dry and clean dressing   HEART: Regular rate and rhythm; No murmurs, rubs, or gallops  ABDOMEN: Bowel sounds present; Soft, Nontender, Nondistended.   EXTREMITIES:  2+ Peripheral Pulses, brisk capillary refill. No clubbing, cyanosis, or edema  NERVOUS SYSTEM:  Alert & Oriented X3, speech clear. No deficits   MSK: FROM all 4 extremities, full and equal strength  SKIN: No rashes or lesions

## 2023-01-06 NOTE — H&P ADULT - PROBLEM/PLAN-3
DISPLAY PLAN FREE TEXT Consent (Ear)/Introductory Paragraph: The rationale for Mohs was explained to the patient and consent was obtained. The risks, benefits and alternatives to therapy were discussed in detail. Specifically, the risks of ear deformity, infection, scarring, bleeding, prolonged wound healing, incomplete removal, allergy to anesthesia, nerve injury and recurrence were addressed. Prior to the procedure, the treatment site was clearly identified and confirmed by the patient. All components of Universal Protocol/PAUSE Rule completed.

## 2023-01-06 NOTE — H&P ADULT - ASSESSMENT
42M from home, with PMHx sickle cell disease w/ multiple hospitalizations for sickle cell complications (anemia and pain crisis) and gout p/w generalized body pain worsening x1d.  42M from home, with PMHx sickle cell disease w/ multiple hospitalizations for sickle cell complications (anemia and pain crisis) and gout p/w generalized body pain worsening x1d. Found to be anemic Hgb 5, remained hemodynamically stable, transfused 2u pRBC in the ED. Admitted for Sickle Cell anemia and pain crisis. [facility] and Pain Management consulted

## 2023-01-06 NOTE — H&P ADULT - HISTORY OF PRESENT ILLNESS
42M from home, with PMHx sickle cell disease w/ multiple hospitalizations for sickle cell complications (anemia and pain crisis) and gout p/w generalized body pain worsening x1d. He reports diffuse body pain since this morning, reports pain in bilateral shoulders, elbows, knees, hips, low back, and ribs, states symptoms are consistent with past sickle cell crises. Also reports b/l frontal HA and dizziness today, since improved upon arrival to . He states his last appointment with Dr. Khanna at [facility] 12/28 followed up with his hematologist and was found to have hemoglobin 5.5 approximately 3 weeks ago, declined transfusion at this time.  Patient reports taking oxycodone 30 mg with little improvement.  42M from home, with PMHx sickle cell disease w/ multiple hospitalizations for sickle cell complications (anemia and pain crisis) and gout p/w generalized body pain worsening x1d. He reports diffuse body pain since this morning, reports pain in bilateral shoulders, elbows, knees, hips, low back, and ribs, states symptoms are consistent with past sickle cell crises. Also reports b/l frontal HA and dizziness today, since improved upon arrival to . He states his last appointment with Dr. Khanna at [facility] 12/28 where he was found to have Hgb 5.5 and declined transfusion, requesting to defer until after the holidays. He denies any fevers, chills, and recent illnesses or sick contact. Noted to have scleral icterus on exam, denies any abdominal pain, N/V/D, as well as alcohol use. He reports taking folic acid and oxycodone 30mg at home q3-4h with little improvement. Reports not taking hydroxyurea for intolerance d/t priapism. Patient denies HA, chest pain, SOB, abdominal pain, or changes in urination or BM.

## 2023-01-06 NOTE — CONSULT NOTE ADULT - PROBLEM SELECTOR RECOMMENDATION 9
Pt with acute exacerbation of chronic back pain and generalized joint pain which is somatic in nature due to sickle cell crisis. Retic % 20.7 (1/6). H/H 5/13.8. + MATA Pt is opioid dependent. On oxycodone 30mg PO 4x/day at home.    Opioid pain recommendations   - Increase Dilaudid 2mg IV q3 hours PRN severe pain. Monitor for sedation/ respiratory depression.   - Titrate to home regimen as sickle cell crisis resolves.   Non-opioid pain recommendations   - Avoid NSAIDs.   - Acetaminophen 1 gram PO q 8 hours PRN moderate pain.   - Lidoderm 4% patch daily.   Bowel Regimen  - Continue Miralax 17G PO daily  - Continue Senna 2 tablets at bedtime for constipation  Mild pain   - Non-pharmacological pain treatment recommendations  - Warm/ Cool packs PRN   - Repositioning, imagery, relaxation, distraction.  - OOB if no contraindications   Recommendations discussed with primary team and RN

## 2023-01-06 NOTE — H&P ADULT - PROBLEM SELECTOR PLAN 4
p/w SCr 1.46 on admission  Baseline SCr - 1.11  likely d/t dehydration in the setting of sickle cell crisis  avoid NSAIDs and nephrotoxic agents   c/w NS IVF x2 4hr  monitor CMP daily p/w SCr 1.46 on admission  Baseline SCr - 1.11  likely d/t dehydration in the setting of sickle cell crisis  avoid NSAIDs and nephrotoxic agents   c/w 1/2 NS IVF x2 4hr  monitor CMP daily

## 2023-01-06 NOTE — ED ADULT NURSE NOTE - ED STAT RN HANDOFF DETAILS 2
Received pt at 7am, alert and oriented x4. Denies any pain. Awaiting blood for transfusion. Port to right chest wall intact, site is clean and dry.

## 2023-01-06 NOTE — H&P ADULT - ATTENDING COMMENTS
#Sickle cell crisis   #Acute anemia   #MATA    42y M with PMH of sickle cell anemia and gout, admitted with sickle cell crisis 2/2 generalized body pain that started the morning of admission. Patient is not on hydroxyurea, stating he develops priapism. Hemoglobin was 5.5 at his last heme/onc appointment last month, at the time, patient refused transfusion.     FH: sister - Sickle cell trait, father - HTN/DM, mother - HTN/DM; both parents are  2/2 COVID    Social Hx:   Tobacco: Former smoker, smoked 1-1.5 ppd  x10 years, quit 20 years ago   EtOH: rare  Recreational drugs: smokes marijuana 2x/day x20 years    Constitutional/General: Well developed, vitals reviewed  EYE: Symmetrical pupils, icteric sclerae    ENT: Good dentition, oropharynx clear  NECK: No visual masses, no JVD  CHEST: No respiratory distress, bilateral symmetrical chest rise  ABDOMEN: Nondistended, no visual masses  SKIN: No rash, warm, dry  NEURO: A+Ox3, Cranial nerves grossly intact, moves all extremities, follows commands  PSYCH: Normal mood, normal affect    -CT head negative for any acute bleed  -Hgb 5.5, transfuse 2u pRBCs and check post-transfusion hemoglobin  -Monitor hgb, transfuse as needed for hgb <7  -NS @ 125 cc/hr for at least 24h  -2L NC  -Pain control with Dilaudid 2mg Q3h for pain 8-10  -Lidocaine patch for low back pain  -F/U hepatitis panel, US RUQ for jaundice; likely 2/2 to sickle cell disease but will r/o other causes  -Takes oxycodone 30mg Q4h at home with minimal relief  -Bowel regimen: Miralax, Senna  -Pain management following, recommendations appreciated  -Not on hydroxyurea 2/2 priapism   -Heme/onc consulted  -DVT ppx: Lovenox #Sickle cell crisis   #Acute anemia   #MATA    42y M with PMH of sickle cell anemia and gout, admitted with sickle cell crisis 2/2 generalized body pain that started the morning of admission. Patient is not on hydroxyurea, stating he develops priapism. Hemoglobin was 5.5 at his last heme/onc appointment last month, at the time, patient refused transfusion.     FH: sister - Sickle cell trait, father - HTN/DM, mother - HTN/DM; both parents are  2/2 COVID    Social Hx:   Tobacco: Former smoker, smoked 1-1.5 ppd  x10 years, quit 20 years ago   EtOH: rare  Recreational drugs: smokes marijuana 2x/day x20 years    Constitutional/General: Well developed, vitals reviewed  EYE: Symmetrical pupils, icteric sclerae    ENT: Good dentition, oropharynx clear  NECK: No visual masses, no JVD  CHEST: No respiratory distress, bilateral symmetrical chest rise  ABDOMEN: Nondistended, no visual masses  SKIN: No rash, warm, dry  NEURO: A+Ox3, Cranial nerves grossly intact, moves all extremities, follows commands  PSYCH: Normal mood, normal affect    -CT head negative for any acute bleed  -Hgb 5.5, transfuse 2u pRBCs and check post-transfusion hemoglobin  -Monitor hgb, transfuse as needed for hgb <7  -NS @ 125 cc/hr for at least 24h  -2L NC  -Pain control with Dilaudid 2mg Q3h for pain 8-10  -Lidocaine patch for low back pain  -F/U hepatitis panel, US RUQ for jaundice; likely 2/2 to sickle cell disease but will r/o other causes  -Takes oxycodone 30mg Q4h at home with minimal relief  -Bowel regimen: Miralax, Senna  -Pain management following, recommendations appreciated  -Not on hydroxyurea 2/2 priapism   -Heme/onc consulted  -DVT ppx: SCDs #Sickle cell crisis   #Acute anemia   #MATA  #Transaminitis     42y M with PMH of sickle cell anemia and gout, admitted with sickle cell crisis 2/2 generalized body pain that started the morning of admission. Patient is not on hydroxyurea, stating he develops priapism. Hemoglobin was 5.5 at his last heme/onc appointment last month, at the time, patient refused transfusion. Follows with heme/onc regularly. Patient states he used to have sickle cell crises every month.     FH: sister - Sickle cell trait, father - HTN/DM, mother - HTN/DM; both parents are  2/2 COVID    Social Hx:   Tobacco: Former smoker, smoked 1-1.5 ppd  x10 years, quit 20 years ago   EtOH: rare  Recreational drugs: smokes marijuana 2x/day x20 years    Constitutional/General: Well developed, vitals reviewed  EYE: Symmetrical pupils, icteric sclerae    ENT: Good dentition, oropharynx clear  NECK: No visual masses, no JVD  CHEST: No respiratory distress, bilateral symmetrical chest rise, right chest wall port in place, receiving blood  ABDOMEN: Nondistended, no visual masses  SKIN: No rash, warm, dry  NEURO: A+Ox3, Cranial nerves grossly intact, moves all extremities, follows commands  PSYCH: Normal mood, normal affect    -CT head negative for any acute bleed  -Hgb 5.5, transfuse 2u pRBCs and check post-transfusion hemoglobin  -Monitor hgb, transfuse as needed for hgb <7  -NS @ 125 cc/hr for at least 24h  -2L NC  -Monitor for development of acute chest syndrome  -Pain control with Dilaudid 2mg Q3h for pain 8-10  -Lidocaine patch for low back pain  -F/U hepatitis panel, US RUQ for jaundice; likely 2/2 to sickle cell disease but will r/o other causes  -Takes oxycodone 30mg Q4h at home with minimal relief  -Bowel regimen: Miralax, Senna  -Pain management following, recommendations appreciated  -Not on hydroxyurea 2/2 priapism   -Heme/onc consulted  -DVT ppx: SCDs

## 2023-01-06 NOTE — ED ADULT NURSE REASSESSMENT NOTE - NS ED NURSE REASSESS COMMENT FT1
1unit PRBC in progress, through chest port, no adverse reactions noted. Patient denies pain, vitals stable, medication given as ordered. admitted to medicine pending bed assignment.

## 2023-01-06 NOTE — H&P ADULT - PROBLEM SELECTOR PLAN 1
p/w Hgb 5, Retic >20, LDH 1021  In the setting of sickle cell anemia crisis with multiple hospitalizations for similar complaint in the past   takes folic acid and oxycodone 30mg at home, unable to tolerate hydroxyurea d/t priapism   baseline Hgb 5.5 12/28 at [facility] f/u, refused to be transfused until after the holidays  transfused 2u pRBC in the ED w/ benadryl IVP   f/u post-transfusion CBC _____    no signs of bleeding on exam  Maintain active T&S q72h, transfuse for Hgb <7  Monitor CBC daily  [facility] consulted: Dr. Khanna, follows o/p p/w Hgb 5, Retic >20, LDH 1021  In the setting of sickle cell anemia crisis with multiple hospitalizations for similar complaint in the past   takes folic acid and oxycodone 30mg at home, unable to tolerate hydroxyurea d/t priapism   baseline Hgb 5.5 12/28 at [facility] f/u, refused to be transfused until after the holidays  transfused 2u pRBC in the ED w/ benadryl IVP   f/u post-transfusion CBC  no signs of bleeding on exam  Maintain active T&S q72h, transfuse for Hgb <6, as recommended by o/p heme/onc, Dr. Jordan  Monitor CBC daily  [facility] consulted: Dr. Khanna, follows o/p

## 2023-01-06 NOTE — PATIENT PROFILE ADULT - PRO INTERPRETER NEED 2
"Initial /68  Pulse 81  SpO2 96% Estimated body mass index is 40.62 kg/(m^2) as calculated from the following:    Height as of 3/2/18: 1.549 m (5' 1\").    Weight as of 3/2/18: 97.5 kg (215 lb). .    Leonila Garcia LPN    "
English

## 2023-01-06 NOTE — ED PROVIDER NOTE - PHYSICAL EXAMINATION
CONSTITUTIONAL: non-toxic, well appearing  SKIN: no rash, no petechiae.  EYES: PERRL, EOMI, pale conjunctiva, bilateral scleral icterus  ENT: tongue and uvular midline, no exudates, moist mucous membranes  NECK: Supple; no meningismus, no JVD  CARD: RRR, no murmurs, equal radial pulses bilaterally 2+  RESP: CTAB, no respiratory distress  ABD: Soft, non-tender, non-distended, no peritoneal signs, no CVA tenderness  EXT: Normal ROM x4. 1+ BLE edema.   NEURO: Alert, oriented. Neuro exam nonfocal.   PSYCH: Cooperative, appropriate.

## 2023-01-07 LAB
ALBUMIN SERPL ELPH-MCNC: 3.3 G/DL — LOW (ref 3.5–5)
ALP SERPL-CCNC: 123 U/L — HIGH (ref 40–120)
ALT FLD-CCNC: 74 U/L DA — HIGH (ref 10–60)
ANION GAP SERPL CALC-SCNC: 4 MMOL/L — LOW (ref 5–17)
AST SERPL-CCNC: 146 U/L — HIGH (ref 10–40)
BASOPHILS # BLD AUTO: 0.05 K/UL — SIGNIFICANT CHANGE UP (ref 0–0.2)
BASOPHILS NFR BLD AUTO: 0.9 % — SIGNIFICANT CHANGE UP (ref 0–2)
BILIRUB DIRECT SERPL-MCNC: 2.7 MG/DL — HIGH (ref 0–0.3)
BILIRUB INDIRECT FLD-MCNC: 3.4 MG/DL — HIGH (ref 0.2–1)
BILIRUB SERPL-MCNC: 6.1 MG/DL — HIGH (ref 0.2–1.2)
BILIRUB SERPL-MCNC: 6.1 MG/DL — HIGH (ref 0.2–1.2)
BUN SERPL-MCNC: 24 MG/DL — HIGH (ref 7–18)
CALCIUM SERPL-MCNC: 9 MG/DL — SIGNIFICANT CHANGE UP (ref 8.4–10.5)
CHLORIDE SERPL-SCNC: 115 MMOL/L — HIGH (ref 96–108)
CO2 SERPL-SCNC: 22 MMOL/L — SIGNIFICANT CHANGE UP (ref 22–31)
CREAT SERPL-MCNC: 0.85 MG/DL — SIGNIFICANT CHANGE UP (ref 0.5–1.3)
EGFR: 111 ML/MIN/1.73M2 — SIGNIFICANT CHANGE UP
EOSINOPHIL # BLD AUTO: 0.54 K/UL — HIGH (ref 0–0.5)
EOSINOPHIL NFR BLD AUTO: 9.3 % — HIGH (ref 0–6)
GLUCOSE SERPL-MCNC: 102 MG/DL — HIGH (ref 70–99)
HCT VFR BLD CALC: 14.5 % — CRITICAL LOW (ref 39–50)
HCT VFR BLD CALC: 25.3 % — LOW (ref 39–50)
HGB BLD-MCNC: 4.4 G/DL — CRITICAL LOW (ref 13–17)
HGB BLD-MCNC: 9.2 G/DL — LOW (ref 13–17)
IMM GRANULOCYTES NFR BLD AUTO: 0.3 % — SIGNIFICANT CHANGE UP (ref 0–0.9)
LYMPHOCYTES # BLD AUTO: 1.78 K/UL — SIGNIFICANT CHANGE UP (ref 1–3.3)
LYMPHOCYTES # BLD AUTO: 30.6 % — SIGNIFICANT CHANGE UP (ref 13–44)
MCHC RBC-ENTMCNC: 30.3 GM/DL — LOW (ref 32–36)
MCHC RBC-ENTMCNC: 31.7 PG — SIGNIFICANT CHANGE UP (ref 27–34)
MCHC RBC-ENTMCNC: 32.5 PG — SIGNIFICANT CHANGE UP (ref 27–34)
MCHC RBC-ENTMCNC: 36.4 GM/DL — HIGH (ref 32–36)
MCV RBC AUTO: 104.3 FL — HIGH (ref 80–100)
MCV RBC AUTO: 89.4 FL — SIGNIFICANT CHANGE UP (ref 80–100)
MONOCYTES # BLD AUTO: 0.83 K/UL — SIGNIFICANT CHANGE UP (ref 0–0.9)
MONOCYTES NFR BLD AUTO: 14.3 % — HIGH (ref 2–14)
NEUTROPHILS # BLD AUTO: 2.6 K/UL — SIGNIFICANT CHANGE UP (ref 1.8–7.4)
NEUTROPHILS NFR BLD AUTO: 44.6 % — SIGNIFICANT CHANGE UP (ref 43–77)
NRBC # BLD: 0 /100 WBCS — SIGNIFICANT CHANGE UP (ref 0–0)
NRBC # BLD: 1 /100 WBCS — HIGH (ref 0–0)
PLATELET # BLD AUTO: 170 K/UL — SIGNIFICANT CHANGE UP (ref 150–400)
PLATELET # BLD AUTO: 243 K/UL — SIGNIFICANT CHANGE UP (ref 150–400)
POTASSIUM SERPL-MCNC: 4.9 MMOL/L — SIGNIFICANT CHANGE UP (ref 3.5–5.3)
POTASSIUM SERPL-SCNC: 4.9 MMOL/L — SIGNIFICANT CHANGE UP (ref 3.5–5.3)
PROT SERPL-MCNC: 7.2 G/DL — SIGNIFICANT CHANGE UP (ref 6–8.3)
RBC # BLD: 1.39 M/UL — LOW (ref 4.2–5.8)
RBC # BLD: 2.83 M/UL — LOW (ref 4.2–5.8)
RBC # FLD: 19.9 % — HIGH (ref 10.3–14.5)
RBC # FLD: 25.2 % — HIGH (ref 10.3–14.5)
SODIUM SERPL-SCNC: 141 MMOL/L — SIGNIFICANT CHANGE UP (ref 135–145)
WBC # BLD: 5.82 K/UL — SIGNIFICANT CHANGE UP (ref 3.8–10.5)
WBC # BLD: 8.73 K/UL — SIGNIFICANT CHANGE UP (ref 3.8–10.5)
WBC # FLD AUTO: 5.82 K/UL — SIGNIFICANT CHANGE UP (ref 3.8–10.5)
WBC # FLD AUTO: 8.73 K/UL — SIGNIFICANT CHANGE UP (ref 3.8–10.5)

## 2023-01-07 PROCEDURE — 99233 SBSQ HOSP IP/OBS HIGH 50: CPT | Mod: GC

## 2023-01-07 RX ORDER — DIPHENHYDRAMINE HCL 50 MG
50 CAPSULE ORAL ONCE
Refills: 0 | Status: COMPLETED | OUTPATIENT
Start: 2023-01-07 | End: 2023-01-07

## 2023-01-07 RX ORDER — ALLOPURINOL 300 MG
100 TABLET ORAL DAILY
Refills: 0 | Status: DISCONTINUED | OUTPATIENT
Start: 2023-01-07 | End: 2023-01-08

## 2023-01-07 RX ORDER — FOLIC ACID 0.8 MG
1 TABLET ORAL DAILY
Refills: 0 | Status: DISCONTINUED | OUTPATIENT
Start: 2023-01-07 | End: 2023-01-08

## 2023-01-07 RX ADMIN — LIDOCAINE 1 PATCH: 4 CREAM TOPICAL at 12:26

## 2023-01-07 RX ADMIN — HYDROMORPHONE HYDROCHLORIDE 2 MILLIGRAM(S): 2 INJECTION INTRAMUSCULAR; INTRAVENOUS; SUBCUTANEOUS at 00:00

## 2023-01-07 RX ADMIN — HYDROMORPHONE HYDROCHLORIDE 2 MILLIGRAM(S): 2 INJECTION INTRAMUSCULAR; INTRAVENOUS; SUBCUTANEOUS at 15:53

## 2023-01-07 RX ADMIN — HEPARIN SODIUM 5000 UNIT(S): 5000 INJECTION INTRAVENOUS; SUBCUTANEOUS at 05:03

## 2023-01-07 RX ADMIN — SODIUM CHLORIDE 125 MILLILITER(S): 9 INJECTION, SOLUTION INTRAVENOUS at 05:03

## 2023-01-07 RX ADMIN — HYDROMORPHONE HYDROCHLORIDE 2 MILLIGRAM(S): 2 INJECTION INTRAMUSCULAR; INTRAVENOUS; SUBCUTANEOUS at 02:07

## 2023-01-07 RX ADMIN — Medication 1000 MILLIGRAM(S): at 10:08

## 2023-01-07 RX ADMIN — SODIUM CHLORIDE 125 MILLILITER(S): 9 INJECTION, SOLUTION INTRAVENOUS at 21:36

## 2023-01-07 RX ADMIN — Medication 50 MILLIGRAM(S): at 14:43

## 2023-01-07 RX ADMIN — LIDOCAINE 1 PATCH: 4 CREAM TOPICAL at 18:56

## 2023-01-07 RX ADMIN — HYDROMORPHONE HYDROCHLORIDE 2 MILLIGRAM(S): 2 INJECTION INTRAMUSCULAR; INTRAVENOUS; SUBCUTANEOUS at 23:15

## 2023-01-07 RX ADMIN — Medication 100 MILLIGRAM(S): at 12:25

## 2023-01-07 RX ADMIN — LIDOCAINE 1 PATCH: 4 CREAM TOPICAL at 01:58

## 2023-01-07 RX ADMIN — SENNA PLUS 2 TABLET(S): 8.6 TABLET ORAL at 21:35

## 2023-01-07 RX ADMIN — HYDROMORPHONE HYDROCHLORIDE 2 MILLIGRAM(S): 2 INJECTION INTRAMUSCULAR; INTRAVENOUS; SUBCUTANEOUS at 08:38

## 2023-01-07 RX ADMIN — HYDROMORPHONE HYDROCHLORIDE 2 MILLIGRAM(S): 2 INJECTION INTRAMUSCULAR; INTRAVENOUS; SUBCUTANEOUS at 06:29

## 2023-01-07 RX ADMIN — HYDROMORPHONE HYDROCHLORIDE 2 MILLIGRAM(S): 2 INJECTION INTRAMUSCULAR; INTRAVENOUS; SUBCUTANEOUS at 12:25

## 2023-01-07 RX ADMIN — Medication 1000 MILLIGRAM(S): at 11:18

## 2023-01-07 RX ADMIN — HYDROMORPHONE HYDROCHLORIDE 2 MILLIGRAM(S): 2 INJECTION INTRAMUSCULAR; INTRAVENOUS; SUBCUTANEOUS at 09:17

## 2023-01-07 RX ADMIN — Medication 1 MILLIGRAM(S): at 12:25

## 2023-01-07 RX ADMIN — HYDROMORPHONE HYDROCHLORIDE 2 MILLIGRAM(S): 2 INJECTION INTRAMUSCULAR; INTRAVENOUS; SUBCUTANEOUS at 18:50

## 2023-01-07 RX ADMIN — HYDROMORPHONE HYDROCHLORIDE 2 MILLIGRAM(S): 2 INJECTION INTRAMUSCULAR; INTRAVENOUS; SUBCUTANEOUS at 02:56

## 2023-01-07 RX ADMIN — HYDROMORPHONE HYDROCHLORIDE 2 MILLIGRAM(S): 2 INJECTION INTRAMUSCULAR; INTRAVENOUS; SUBCUTANEOUS at 12:41

## 2023-01-07 RX ADMIN — HEPARIN SODIUM 5000 UNIT(S): 5000 INJECTION INTRAVENOUS; SUBCUTANEOUS at 14:44

## 2023-01-07 RX ADMIN — HEPARIN SODIUM 5000 UNIT(S): 5000 INJECTION INTRAVENOUS; SUBCUTANEOUS at 21:36

## 2023-01-07 RX ADMIN — HYDROMORPHONE HYDROCHLORIDE 2 MILLIGRAM(S): 2 INJECTION INTRAMUSCULAR; INTRAVENOUS; SUBCUTANEOUS at 15:38

## 2023-01-07 RX ADMIN — Medication 50 MILLIGRAM(S): at 10:08

## 2023-01-07 RX ADMIN — HYDROMORPHONE HYDROCHLORIDE 2 MILLIGRAM(S): 2 INJECTION INTRAMUSCULAR; INTRAVENOUS; SUBCUTANEOUS at 21:36

## 2023-01-07 RX ADMIN — HYDROMORPHONE HYDROCHLORIDE 2 MILLIGRAM(S): 2 INJECTION INTRAMUSCULAR; INTRAVENOUS; SUBCUTANEOUS at 19:25

## 2023-01-07 RX ADMIN — HYDROMORPHONE HYDROCHLORIDE 2 MILLIGRAM(S): 2 INJECTION INTRAMUSCULAR; INTRAVENOUS; SUBCUTANEOUS at 05:08

## 2023-01-07 NOTE — PROGRESS NOTE ADULT - PROBLEM SELECTOR PLAN 4
p/w SCr 1.46 on admission  Baseline SCr - 1.11  likely d/t dehydration in the setting of sickle cell crisis  avoid NSAIDs and nephrotoxic agents   c/w 1/2 NS IVF x2 4hr  monitor CMP daily

## 2023-01-07 NOTE — PROGRESS NOTE ADULT - SUBJECTIVE AND OBJECTIVE BOX
PGY-1 Progress Note discussed with attending    PAGER #: [phone]] TILL 5:00 PM  PLEASE CONTACT ON CALL TEAM:  - On Call Team (Please refer to Rubi) FROM 5:00 PM - 8:30PM  - Nightfloat Team FROM 8:30 -7:30 AM    OVERNIGHT EVENTS:   - No acute overnight events. Pt seen at bedside, NAD, complaining of 7/10 generalized body pain, more in the lower back, but pain is going down because he just received pain medications. Denies fevers, chills, headaches, dizziness, CP, SOB, abdominal pain, dysuria, N/V/D    REVIEW OF SYSTEMS:  CONSTITUTIONAL: (+) generalized body pain. No fever, weight loss, or fatigue  RESPIRATORY: No cough, wheezing, chills or hemoptysis; No shortness of breath  CARDIOVASCULAR: No chest pain, palpitations, dizziness, or leg swelling  GASTROINTESTINAL: No abdominal pain. No nausea, vomiting, or hematemesis; No diarrhea or constipation. No melena or hematochezia.  GENITOURINARY: No dysuria or hematuria, urinary frequency  NEUROLOGICAL: No headaches, memory loss, loss of strength, numbness, or tremors  SKIN: No itching, burning, rashes, or lesions     MEDICATIONS  (STANDING):  allopurinol 100 milliGRAM(s) Oral daily  dextrose 5% + sodium chloride 0.45%. 1000 milliLiter(s) (125 mL/Hr) IV Continuous <Continuous>  folic acid 1 milliGRAM(s) Oral daily  heparin   Injectable 5000 Unit(s) SubCutaneous every 8 hours  lidocaine   4% Patch 1 Patch Transdermal daily  polyethylene glycol 3350 17 Gram(s) Oral daily  senna 2 Tablet(s) Oral at bedtime    MEDICATIONS  (PRN):  acetaminophen     Tablet .. 1000 milliGRAM(s) Oral every 8 hours PRN Moderate Pain (4 - 6)  HYDROmorphone  Injectable 2 milliGRAM(s) IV Push every 3 hours PRN Severe Pain (7 - 10)      Vital Signs Last 24 Hrs  T(C): 36.5 (07 Jan 2023 08:34), Max: 37 (06 Jan 2023 17:57)  T(F): 97.7 (07 Jan 2023 08:34), Max: 98.6 (06 Jan 2023 17:57)  HR: 78 (07 Jan 2023 08:34) (68 - 79)  BP: 154/87 (07 Jan 2023 08:34) (103/64 - 154/87)  BP(mean): --  RR: 16 (07 Jan 2023 08:34) (16 - 18)  SpO2: 96% (07 Jan 2023 08:34) (94% - 96%)    Parameters below as of 07 Jan 2023 08:34  Patient On (Oxygen Delivery Method): room air    PHYSICAL  GENERAL: NAD, lying in bed comfortably  HEAD:  Atraumatic, Normocephalic  EYES: EOMI, PERRLA, (+) scleral icterus, new  ENT: Moist mucous membranes  NECK: Supple, No JVD  CHEST/LUNG: Clear to auscultation bilaterally; No rales, rhonchi, wheezing, or rubs. Unlabored respirations (+) right-sided chest port, dry and clean dressing   HEART: Regular rate and rhythm; No murmurs, rubs, or gallops  ABDOMEN: Bowel sounds present; Soft, Nontender, Nondistended.   EXTREMITIES:  2+ Peripheral Pulses, brisk capillary refill. No clubbing, cyanosis, or edema  NERVOUS SYSTEM:  Alert & Oriented X3, speech clear. No deficits   MSK: FROM all 4 extremities, full and equal strength  SKIN: No rashes or lesions                          4.4    5.82  )-----------( 170      ( 07 Jan 2023 06:50 )             14.5     01-07    141  |  115<H>  |  24<H>  ----------------------------<  102<H>  4.9   |  22  |  0.85    Ca    9.0      07 Jan 2023 06:50    TPro  7.2  /  Alb  3.3<L>  /  TBili  6.1<H>  /  DBili  2.7<H>  /  AST  146<H>  /  ALT  74<H>  /  AlkPhos  123<H>  01-07    LIVER FUNCTIONS - ( 07 Jan 2023 06:50 )  Alb: 3.3 g/dL / Pro: 7.2 g/dL / ALK PHOS: 123 U/L / ALT: 74 U/L DA / AST: 146 U/L / GGT: x               PT/INR - ( 06 Jan 2023 02:28 )   PT: 13.5 sec;   INR: 1.13 ratio         PTT - ( 06 Jan 2023 02:28 )  PTT:31.6 sec  COVID-19 PCR: NotDetec (07 Oct 2022 01:46)  COVID-19 PCR: NotDetec (15 Aug 2022 02:57)      CAPILLARY BLOOD GLUCOSE          RADIOLOGY & ADDITIONAL TESTS:

## 2023-01-07 NOTE — PROGRESS NOTE ADULT - PROBLEM SELECTOR PLAN 2
h/o sickle cell disease with multiple hospitalizations for sickle cell pain crisis in the past  takes folic acid and oxycodone 30mg with little relief at home, unable to tolerate hydroxyurea d/t priapism   s/p Tylenol 975mg x1 and Dilaudid 2mg x2 in the ED   will start with Tylenol PRN for mild pain and Dilaudid 2mg q4 for breakthrough pain   Pain Management consulted  recs: Dilaudid 2mg q3 PRN for severe pain, tylenol 1g PO q8 PRN for moderate pain, lidoderm 4% patch qd  OOBTC

## 2023-01-07 NOTE — PROVIDER CONTACT NOTE (CRITICAL VALUE NOTIFICATION) - TEST AND RESULT REPORTED:
hemoglobin 4.4, hema 14.6
hemoglobin- 5.0 hematocrit - 13.8
Hgb=7.0   Hgb=7.0   Hemoglobin=7.0 and Hematocrit 19.6

## 2023-01-07 NOTE — CONSULT NOTE ADULT - SUBJECTIVE AND OBJECTIVE BOX
CHIEF COMPLAINT  Anemia    HISTORY OF PRESENT ILLNESS  TOLU VARGAS is a 42y Male who presents with a chief complaint of anemia    Patient was admitted on January 6th after presenting to the Emergency Department with worsening diffuse pain in the shoulder, elbows, hips, ribs, and back. He also complained of headaches and dizziness. He was admitted for further evaluation and management.    Patient follows with Dr. Shirley Jordan for sickle cell disease. His chronic hemoglobin is around 5-6, and he has had multiple episodes of pain crises. He takes folic acid at home; previously unable to tolerate hydroxyurea [priapism] and voxelotor [leg swelling]. He was last seen in clinic on December 29th, 2022.     PAST MEDICAL AND SURGICAL HISTORY  Gout  Sickle Cell Disease    FAMILY HISTORY  Sickle Cell    SOCIAL HISTORY  Denies tobacco use    REVIEW OF SYSTEMS  A complete review of systems was performed; negative except per HPI    PHYSICAL EXAM  T(C): 36.5 (01-07-23 @ 08:34), Max: 37 (01-06-23 @ 17:57)  HR: 78 (01-07-23 @ 08:34) (68 - 79)  BP: 154/87 (01-07-23 @ 08:34) (103/64 - 154/87)  RR: 16 (01-07-23 @ 08:34) (16 - 18)  SpO2: 96% (01-07-23 @ 08:34) (94% - 96%)  Constitutional: alert, awake, in no acute distress  Eyes: PERRL, EOMI  HEENT: normocephalic, atraumatic  Neck: supple, non-tender  Cardiovascular: normal perfusion, no peripheral edema  Respiratory: normal respiratory efforts; no increased use of accessory muscles  Gastrointestinal: soft, non-tender  Musculoskeletal: normal range of motion, no deformities noted  Neurological: alert, CN II to XI grossly intact  Skin: warm, dry    LABORATORY DATA                        4.4    5.82  )-----------( 170      ( 07 Jan 2023 06:50 )             14.5     01-07    141  |  115<H>  |  24<H>  ----------------------------<  102<H>  4.9   |  22  |  0.85    Ca    9.0      07 Jan 2023 06:50    TPro  7.2  /  Alb  3.3<L>  /  TBili  6.1<H>  /  DBili  2.7<H>  /  AST  146<H>  /  ALT  74<H>  /  AlkPhos  123<H>  01-07    RADIOLOGY REVIEW  IMPRESSION:    No acute infiltrate.    Mild cardiomegaly.    Central line in situ.  
  Source of information: TOLU VARGAS, Chart review  Patient language: English  : n/a    HPI:      Patient is a 42y old  Male with PMH sickle cell disease, gout who presents with a chief complaint of Generalized pain. Dx with sickle cell crisis. Retic % 20.7. H/H 5/13.8. Received 1 unit PRBC, awaiting second unit PRBC. CT head negative. Pain consulted for sickle cell pain. (Last seen by pain management during prior admission on 10/2022). Pt seen and examined at bedside. Sitting in stretcher, reports lumbar back pain exacerbation started on 1/5. Denies any precipitating pain factors. Pt with chronic pain and is opioid dependent. Per istop review, on oxycodone 30mg PO 6 times/ day, last prescription filled on 1/2/2023. Currently rating lumbar back pain score 10/10 and not tolerable SCALE USED: (1-10 VNRS). Pt describes pain as sharp, non- radiating, alleviated by IV pain medication (requesting dose to be changed to q3h), exacerbated by movement. Also reports generalized joint pain, over b/l arms and knees. rating knee pain 8/10 and arm pain 7/10, aching, non-radiating, alleviated by IV pain medication, exacerbated by movement. Pt tolerating PO diet. Denies lethargy, chest pain, SOB, nausea, vomiting, constipation. Reports last BM 1/5 Patient stated goal for pain control: to be able to take deep breaths, get out of bed to chair and ambulate with tolerable pain control.     PAST MEDICAL & SURGICAL HISTORY:  Sickle cell anemia      Gout      History of cholecystectomy          FAMILY HISTORY:  Family history of sickle cell trait (Father, Mother)        Social History:   [X] Denies ETOH use, illicit drug use and smoking    Allergies    ceftriaxone (Anaphylaxis)  hydroxyurea (Other)  penicillin (Pruritus)  piperacillin-tazobactam (Urticaria)    MEDICATIONS  (STANDING):  polyethylene glycol 3350 17 Gram(s) Oral daily  senna 2 Tablet(s) Oral at bedtime  sodium chloride 0.45%. 1000 milliLiter(s) (125 mL/Hr) IV Continuous <Continuous>    MEDICATIONS  (PRN):  acetaminophen     Tablet .. 650 milliGRAM(s) Oral every 6 hours PRN Temp greater or equal to 38C (100.4F), Mild Pain (1 - 3)  acetaminophen     Tablet .. 1000 milliGRAM(s) Oral every 8 hours PRN Moderate Pain (4 - 6)  HYDROmorphone  Injectable 2 milliGRAM(s) IV Push every 3 hours PRN Severe Pain (7 - 10)      Vital Signs Last 24 Hrs  T(C): 36.6 (06 Jan 2023 14:10), Max: 37.3 (06 Jan 2023 01:37)  T(F): 97.8 (06 Jan 2023 14:10), Max: 99.2 (06 Jan 2023 01:37)  HR: 70 (06 Jan 2023 14:10) (68 - 103)  BP: 121/72 (06 Jan 2023 14:10) (103/64 - 139/93)  BP(mean): --  RR: 18 (06 Jan 2023 14:10) (18 - 18)  SpO2: 95% (06 Jan 2023 14:10) (90% - 99%)    Parameters below as of 06 Jan 2023 13:08  Patient On (Oxygen Delivery Method): room air        LABS: Reviewed.                          5.0    12.16 )-----------( 236      ( 06 Jan 2023 02:28 )             13.8     01-06    139  |  109<H>  |  34<H>  ----------------------------<  119<H>  4.7   |  22  |  1.46<H>    Ca    8.7      06 Jan 2023 02:28    TPro  7.5  /  Alb  3.5  /  TBili  5.9<H>  /  DBili  x   /  AST  137<H>  /  ALT  71<H>  /  AlkPhos  130<H>  01-06    PT/INR - ( 06 Jan 2023 02:28 )   PT: 13.5 sec;   INR: 1.13 ratio         PTT - ( 06 Jan 2023 02:28 )  PTT:31.6 sec  LIVER FUNCTIONS - ( 06 Jan 2023 02:28 )  Alb: 3.5 g/dL / Pro: 7.5 g/dL / ALK PHOS: 130 U/L / ALT: 71 U/L DA / AST: 137 U/L / GGT: x             CAPILLARY BLOOD GLUCOSE        COVID-19 PCR: NotDetec (07 Oct 2022 01:46)  COVID-19 PCR: NotDetec (15 Aug 2022 02:57)      Radiology: Reviewed.   < from: CT Head No Cont (01.06.23 @ 03:17) >    ACC: [id number] EXAM:  CT BRAIN                          PROCEDURE DATE:  01/06/2023          INTERPRETATION:  CLINICAL INFORMATION: headache, dizziness, history of   sickle cell. HCT. .    TECHNIQUE: Sequential axial images were obtained from the vertex to the   skull base without intravenous contrast. Coronal and sagittal   reformations were obtained.    COMPARISON: CT head dated 6/23/2022.    FINDINGS:    There is no acute intracranial hemorrhage or mass effect. The ventricles   and sulci are normal in size for patient's age.    There is no extraaxial fluid collection.    There is no displaced calvarial fracture. The visualized orbits are   within normal limits. The visualized portions of the paranasal sinuses   are well aerated. The mastoid air cells are well aerated.      IMPRESSION: No acute intracranial hemorrhage or mass effect.    --- End of Report ---             RADHA CEJA MD; Attending Radiologist  This document has been electronically signed. Jan 6 2023  5:07AM    < end of copied text >    < from: Xray Chest 2 Views PA/Lat (01.06.23 @ 03:10) >    ACC: [id number] EXAM:  XR CHEST PA LAT 2V                          PROCEDURE DATE:  01/06/2023          INTERPRETATION:  CLINICAL INDICATION: 42 years  Male with CP, BLE edema,   history of sickle cell disease.    COMPARISON: 8/15/2022    Right medication port reidentified with tip overlying the cavoatrial   junction.    PA and lateral views of the chest demonstrate mild left basilar discoid   atelectasis. No focal consolidation.. There is no pleural effusion. There   is no pneumothorax.    The heart is enlarged. There is no mediastinal or hilar mass.  The   pulmonary vasculature is normal.    The osseous structures are unremarkable.    IMPRESSION:    No acute infiltrate.    Mild cardiomegaly.    Central line in situ.    --- End of Report ---            ROJELIO VELARDE MD; Attending Radiologist  This document has been electronically signed. Jan 6 2023  8:28AM    < end of copied text >      ORT Score -   Family Hx of substance abuse	Female	      Male  Alcohol 	                                           1                     3  Illegal drugs	                                   2                     3  Rx drugs                                           4 	                  4  Personal Hx of substance abuse		  Alcohol 	                                          3	                  3  Illegal drugs                                     4	                  4  Rx drugs                                            5 	                  5  Age between 16- 45 years	           1                     1  hx preadolescent sexual abuse	   3 	                  0  Psychological disease		  ADD, OCD, bipolar, schizophrenia   2	          2  Depression                                           1 	          1  Total:1    a score of 3 or lower indicates low risk for opioid abuse		  a score of 4-7 indicates moderate risk for opioid abuse		  a score of 8 or higher indicates high risk for opioid abuse  	  REVIEW OF SYSTEMS:  CONSTITUTIONAL: No fever or fatigue  HEENT:  No difficulty hearing, no change in vision  NECK: No pain or stiffness  RESPIRATORY: No cough, wheezing, chills or hemoptysis; No shortness of breath  CARDIOVASCULAR: No chest pain, palpitations, dizziness, or leg swelling  GASTROINTESTINAL: No loss of appetite, decreased PO intake. No abdominal or epigastric pain. No nausea, vomiting; No diarrhea or constipation.   GENITOURINARY: No dysuria, frequency, hematuria, retention or incontinence  MUSCULOSKELETAL: + generalized joint pain, greatest over lumbar back. No joint swelling; no upper or lower motor strength weakness, no saddle anesthesia, bowel/bladder incontinence, no falls   NEURO: No headaches, No numbness/tingling b/l LE, No weakness    PHYSICAL EXAM:  GENERAL:  Alert & Oriented X4, cooperative, NAD, Good concentration. Speech is clear.   RESPIRATORY: Respirations even and unlabored. Clear to auscultation bilaterally; No rales, rhonchi, wheezing, or rubs  CARDIOVASCULAR: Normal S1/S2, regular rate and rhythm; No murmurs, rubs, or gallops. No JVD. + right chest wall mediport site accessed, dressing c/d/i   GASTROINTESTINAL:  Soft, Nontender, Nondistended; Bowel sounds present  PERIPHERAL VASCULAR:  Extremities warm without edema. 2+ Peripheral Pulses, No cyanosis, No calf tenderness  MUSCULOSKELETAL: Motor Strength 5/5 B/L upper and lower extremities; moves all extremities equally against gravity; ROM intact; negative SLR; + lumbar back and b/l knee tenderness on palpation.   SKIN: Warm, dry, intact. No rashes, lesions, scars or wounds.     Risk factors associated with adverse outcomes related to opioid treatment  [ ]  Concurrent benzodiazepine use  [ ]  History/ Active substance use or alcohol use disorder  [ ] Psychiatric co-morbidity  [ ] Sleep apnea  [ ] COPD  [ ] BMI> 35  [ ] Liver dysfunction  [ ] Renal dysfunction  [ ] CHF  [ ] Smoker  [ ]  Age > 60 years    [X]  [place]  Reviewed and Copied to Chart. See below.    Plan of care and goal oriented pain management treatment options were discussed with patient and /or primary care giver; all questions and concerns were addressed and care was aligned with patient's wishes.    Educated patient on goal oriented pain management treatment options

## 2023-01-07 NOTE — PROGRESS NOTE ADULT - ASSESSMENT
42M from home, with PMHx sickle cell disease w/ multiple hospitalizations for sickle cell complications (anemia and pain crisis) and gout p/w generalized body pain worsening x1d. Found to be anemic Hgb 5, remained hemodynamically stable, transfused 2u pRBC in the ED. Admitted for Sickle Cell anemia and pain crisis. [facility] and Pain Management consulted

## 2023-01-07 NOTE — CONSULT NOTE ADULT - ASSESSMENT
TOLU VARGAS is a 42y Male who presents with a chief complaint of sickle cell crisis    Sickle Cell Anemia  - Patient follows with Dr. Shirley Jordan.  - Patient with baseline hemoglobin in the 5-6 range. Baseline bilirubin is in the 6 range from chronic ongoing sickle cell hemolysis.  - Monitor CBC. Hemoglobin today is 4.4. Recommend 2 units of blood transfusion. Maintain hemoglobin to at least 5 or transfuse if symptomatic.   - IVF and IV opiate pain control as needed.  - Continue folic acid.   - Pain management consultation.    Patient will need to start deferasirox outpatient. Consider exchange transfusion program outpatient.     Will continue to follow.    Bala Pan MD  Hematology/Oncology  O: [phone]
Confidential Drug Utilization Report  Search Terms: Alex Downey, 1980Search Date: 01/06/2023 13:29:20 PM  The Drug Utilization Report below displays all of the controlled substance prescriptions, if any, that your patient has filled in the last twelve months. The information displayed on this report is compiled from pharmacy submissions to the Department, and accurately reflects the information as submitted by the pharmacies.    This report was requested by: Lina Cintron | Reference #: [id number]    You have not added a RAFAELA number. Keeping your RAFAELA number(s) up to date on the My RAFAELA # page will enable the separation of your prescriptions from others in the search results.    Others' Prescriptions  Patient Name: Alex DowneyBirth Date: 1980  Address: [address] [place] [id number]: Male  Rx Written	Rx Dispensed	Drug	Quantity	Days Supply	Prescriber Name	Prescriber Rafaela #	Payment Method	Dispenser  12/29/2022	01/02/2023	oxycodone hcl (ir) 30 mg tab	180	30	Jag Ayala	[id number]	Insurance	[facility]   12/01/2022	12/03/2022	oxycodone hcl (ir) 30 mg tab	180	30	Jordan, Shirley SOLITARIO	[id number]	Insurance	[facility]   11/03/2022	11/04/2022	oxycodone hcl (ir) 30 mg tab	180	30	Jordan, Shirley SOLITARIO	[id number]	Insurance	[facility]   10/06/2022	10/10/2022	oxycodone hcl (ir) 30 mg tab	180	30	Jordan, Shirley SOLITARIO	[id number]	Insurance	[facility]   09/08/2022	09/09/2022	oxycodone hcl (ir) 30 mg tab	180	30	Jordan, Shirley SOLITARIO	[id number]	Insurance	[facility]   08/11/2022	08/12/2022	oxycodone hcl (ir) 30 mg tab	180	30	Jordan, Shirley SOLITARIO	[id number]	Insurance	[facility]   07/14/2022	07/15/2022	oxycodone hcl (ir) 30 mg tab	180	30	Jordan, Shirley SOLITARIO	[id number]	Insurance	[facility]   06/16/2022	06/16/2022	oxycodone hcl (ir) 30 mg tab	180	30	Jordan, Shirley SOLITARIO	[id number]	Insurance	[facility]   05/19/2022	05/20/2022	oxycodone hcl (ir) 30 mg tab	180	30	Jordan, Shirley SOLITARIO	[id number]	Insurance	[facility]   04/21/2022	04/22/2022	oxycodone hcl (ir) 30 mg tab	180	30	Jordan, Shirley SOLITARIO	[id number]	Insurance	[facility]   03/24/2022	03/25/2022	oxycodone hcl (ir) 30 mg tab	180	30	Jordan, Shirley SOLITARIO	[id number]	Insurance	[facility]   02/24/2022	02/24/2022	oxycodone hcl (ir) 30 mg tab	180	30	Jordan, Shirley SOLITARIO	[id number]	Insurance	[facility]   * - Drugs marked with an asterisk are compound drugs. If the compound drug is made up of more than one controlled substance, then each controlled substance will be a separate row in the table.

## 2023-01-07 NOTE — PROGRESS NOTE ADULT - PROBLEM SELECTOR PLAN 1
p/w Hgb 5, Retic >20, LDH 1021  In the setting of sickle cell anemia crisis with multiple hospitalizations for similar complaint in the past   takes folic acid and oxycodone 30mg at home, unable to tolerate hydroxyurea d/t priapism   baseline Hgb 5.5 12/28 at [facility] f/u, refused to be transfused until after the holidays  transfused 2u pRBC in the ED w/ benadryl IVP, post transfusion Hb 7  no signs of bleeding on exam  Hb today 1/7: 4.4  Transfuse 2U PRBC, f/u post transfusion CBC  Maintain active T&S q72h, transfuse for Hgb <6, as recommended by o/p heme/onc, Dr. Jordan  Monitor CBC daily  [facility] consulted: Dr. Khanna, follows o/p p/w Hgb 5, Retic >20, LDH 1021  In the setting of sickle cell anemia crisis with multiple hospitalizations for similar complaint in the past   takes folic acid and oxycodone 30mg at home, unable to tolerate hydroxyurea d/t priapism   baseline Hgb 5.5 12/28 at [facility] f/u, refused to be transfused until after the holidays  transfused 2u pRBC in the ED w/ benadryl IVP, post transfusion Hb 7  no signs of bleeding on exam  Hb today 1/7: 4.4  Transfuse 2U PRBC, f/u post transfusion CBC  Maintain active T&S q72h, transfuse for Hgb <5 or symptomatic, as per [facility]  Monitor CBC daily  [facility] consulted: Dr. Jordan follows o/p

## 2023-01-07 NOTE — PROGRESS NOTE ADULT - PROBLEM SELECTOR PLAN 3
p/w scleral icterus, denies noticed change. denies ETOH use, only on occasion   likely in the setting acute sickle cell crisis  Bili 5.9 (baseline >5 Oct 2022)   /ALT 71 > 146/74  f/u RUQ US  f/u hepatitis panel  will continue to monitor for improvement

## 2023-01-08 ENCOUNTER — TRANSCRIPTION ENCOUNTER (OUTPATIENT)
Age: 43
End: 2023-01-08

## 2023-01-08 VITALS
SYSTOLIC BLOOD PRESSURE: 146 MMHG | DIASTOLIC BLOOD PRESSURE: 77 MMHG | OXYGEN SATURATION: 95 % | TEMPERATURE: 99 F | HEART RATE: 65 BPM | RESPIRATION RATE: 17 BRPM

## 2023-01-08 LAB
ALBUMIN SERPL ELPH-MCNC: 3.2 G/DL — LOW (ref 3.5–5)
ALP SERPL-CCNC: 129 U/L — HIGH (ref 40–120)
ALT FLD-CCNC: 73 U/L DA — HIGH (ref 10–60)
ANION GAP SERPL CALC-SCNC: 7 MMOL/L — SIGNIFICANT CHANGE UP (ref 5–17)
AST SERPL-CCNC: 139 U/L — HIGH (ref 10–40)
BASOPHILS # BLD AUTO: 0.12 K/UL — SIGNIFICANT CHANGE UP (ref 0–0.2)
BASOPHILS NFR BLD AUTO: 1.5 % — SIGNIFICANT CHANGE UP (ref 0–2)
BILIRUB SERPL-MCNC: 6.5 MG/DL — HIGH (ref 0.2–1.2)
BUN SERPL-MCNC: 21 MG/DL — HIGH (ref 7–18)
CALCIUM SERPL-MCNC: 8.9 MG/DL — SIGNIFICANT CHANGE UP (ref 8.4–10.5)
CHLORIDE SERPL-SCNC: 112 MMOL/L — HIGH (ref 96–108)
CO2 SERPL-SCNC: 22 MMOL/L — SIGNIFICANT CHANGE UP (ref 22–31)
CREAT SERPL-MCNC: 0.85 MG/DL — SIGNIFICANT CHANGE UP (ref 0.5–1.3)
EGFR: 111 ML/MIN/1.73M2 — SIGNIFICANT CHANGE UP
EOSINOPHIL # BLD AUTO: 0.77 K/UL — HIGH (ref 0–0.5)
EOSINOPHIL NFR BLD AUTO: 9.3 % — HIGH (ref 0–6)
GLUCOSE SERPL-MCNC: 117 MG/DL — HIGH (ref 70–99)
HCT VFR BLD CALC: 25.5 % — LOW (ref 39–50)
HGB BLD-MCNC: 8.9 G/DL — LOW (ref 13–17)
IMM GRANULOCYTES NFR BLD AUTO: 0.4 % — SIGNIFICANT CHANGE UP (ref 0–0.9)
LYMPHOCYTES # BLD AUTO: 2.97 K/UL — SIGNIFICANT CHANGE UP (ref 1–3.3)
LYMPHOCYTES # BLD AUTO: 35.9 % — SIGNIFICANT CHANGE UP (ref 13–44)
MAGNESIUM SERPL-MCNC: 1.9 MG/DL — SIGNIFICANT CHANGE UP (ref 1.6–2.6)
MCHC RBC-ENTMCNC: 31.2 PG — SIGNIFICANT CHANGE UP (ref 27–34)
MCHC RBC-ENTMCNC: 34.9 GM/DL — SIGNIFICANT CHANGE UP (ref 32–36)
MCV RBC AUTO: 89.5 FL — SIGNIFICANT CHANGE UP (ref 80–100)
MONOCYTES # BLD AUTO: 1.2 K/UL — HIGH (ref 0–0.9)
MONOCYTES NFR BLD AUTO: 14.5 % — HIGH (ref 2–14)
NEUTROPHILS # BLD AUTO: 3.18 K/UL — SIGNIFICANT CHANGE UP (ref 1.8–7.4)
NEUTROPHILS NFR BLD AUTO: 38.4 % — LOW (ref 43–77)
NRBC # BLD: 1 /100 WBCS — HIGH (ref 0–0)
PHOSPHATE SERPL-MCNC: 3.8 MG/DL — SIGNIFICANT CHANGE UP (ref 2.5–4.5)
PLATELET # BLD AUTO: 254 K/UL — SIGNIFICANT CHANGE UP (ref 150–400)
POTASSIUM SERPL-MCNC: 4.8 MMOL/L — SIGNIFICANT CHANGE UP (ref 3.5–5.3)
POTASSIUM SERPL-SCNC: 4.8 MMOL/L — SIGNIFICANT CHANGE UP (ref 3.5–5.3)
PROT SERPL-MCNC: 6.9 G/DL — SIGNIFICANT CHANGE UP (ref 6–8.3)
RBC # BLD: 2.85 M/UL — LOW (ref 4.2–5.8)
RBC # FLD: 20 % — HIGH (ref 10.3–14.5)
SODIUM SERPL-SCNC: 141 MMOL/L — SIGNIFICANT CHANGE UP (ref 135–145)
WBC # BLD: 8.27 K/UL — SIGNIFICANT CHANGE UP (ref 3.8–10.5)
WBC # FLD AUTO: 8.27 K/UL — SIGNIFICANT CHANGE UP (ref 3.8–10.5)

## 2023-01-08 PROCEDURE — 99239 HOSP IP/OBS DSCHRG MGMT >30: CPT | Mod: GC

## 2023-01-08 PROCEDURE — 71046 X-RAY EXAM CHEST 2 VIEWS: CPT

## 2023-01-08 PROCEDURE — 86900 BLOOD TYPING SEROLOGIC ABO: CPT

## 2023-01-08 PROCEDURE — 96374 THER/PROPH/DIAG INJ IV PUSH: CPT

## 2023-01-08 PROCEDURE — 87637 SARSCOV2&INF A&B&RSV AMP PRB: CPT

## 2023-01-08 PROCEDURE — 84484 ASSAY OF TROPONIN QUANT: CPT

## 2023-01-08 PROCEDURE — 82248 BILIRUBIN DIRECT: CPT

## 2023-01-08 PROCEDURE — 86922 COMPATIBILITY TEST ANTIGLOB: CPT

## 2023-01-08 PROCEDURE — 86850 RBC ANTIBODY SCREEN: CPT

## 2023-01-08 PROCEDURE — 93005 ELECTROCARDIOGRAM TRACING: CPT

## 2023-01-08 PROCEDURE — 86901 BLOOD TYPING SEROLOGIC RH(D): CPT

## 2023-01-08 PROCEDURE — 36430 TRANSFUSION BLD/BLD COMPNT: CPT

## 2023-01-08 PROCEDURE — 85730 THROMBOPLASTIN TIME PARTIAL: CPT

## 2023-01-08 PROCEDURE — P9040: CPT

## 2023-01-08 PROCEDURE — 36415 COLL VENOUS BLD VENIPUNCTURE: CPT

## 2023-01-08 PROCEDURE — 83735 ASSAY OF MAGNESIUM: CPT

## 2023-01-08 PROCEDURE — 70450 CT HEAD/BRAIN W/O DYE: CPT | Mod: MA

## 2023-01-08 PROCEDURE — 99285 EMERGENCY DEPT VISIT HI MDM: CPT | Mod: 25

## 2023-01-08 PROCEDURE — 80053 COMPREHEN METABOLIC PANEL: CPT

## 2023-01-08 PROCEDURE — 83615 LACTATE (LD) (LDH) ENZYME: CPT

## 2023-01-08 PROCEDURE — 96376 TX/PRO/DX INJ SAME DRUG ADON: CPT

## 2023-01-08 PROCEDURE — 82247 BILIRUBIN TOTAL: CPT

## 2023-01-08 PROCEDURE — 83880 ASSAY OF NATRIURETIC PEPTIDE: CPT

## 2023-01-08 PROCEDURE — 84100 ASSAY OF PHOSPHORUS: CPT

## 2023-01-08 PROCEDURE — 85027 COMPLETE CBC AUTOMATED: CPT

## 2023-01-08 PROCEDURE — 85025 COMPLETE CBC W/AUTO DIFF WBC: CPT

## 2023-01-08 PROCEDURE — 85610 PROTHROMBIN TIME: CPT

## 2023-01-08 PROCEDURE — 85045 AUTOMATED RETICULOCYTE COUNT: CPT

## 2023-01-08 RX ADMIN — LIDOCAINE 1 PATCH: 4 CREAM TOPICAL at 12:51

## 2023-01-08 RX ADMIN — HYDROMORPHONE HYDROCHLORIDE 2 MILLIGRAM(S): 2 INJECTION INTRAMUSCULAR; INTRAVENOUS; SUBCUTANEOUS at 02:15

## 2023-01-08 RX ADMIN — HYDROMORPHONE HYDROCHLORIDE 2 MILLIGRAM(S): 2 INJECTION INTRAMUSCULAR; INTRAVENOUS; SUBCUTANEOUS at 01:30

## 2023-01-08 RX ADMIN — SODIUM CHLORIDE 125 MILLILITER(S): 9 INJECTION, SOLUTION INTRAVENOUS at 05:54

## 2023-01-08 RX ADMIN — Medication 1000 MILLIGRAM(S): at 12:06

## 2023-01-08 RX ADMIN — Medication 1 MILLIGRAM(S): at 12:51

## 2023-01-08 RX ADMIN — Medication 1000 MILLIGRAM(S): at 11:01

## 2023-01-08 RX ADMIN — POLYETHYLENE GLYCOL 3350 17 GRAM(S): 17 POWDER, FOR SOLUTION ORAL at 12:51

## 2023-01-08 RX ADMIN — HYDROMORPHONE HYDROCHLORIDE 2 MILLIGRAM(S): 2 INJECTION INTRAMUSCULAR; INTRAVENOUS; SUBCUTANEOUS at 16:35

## 2023-01-08 RX ADMIN — HYDROMORPHONE HYDROCHLORIDE 2 MILLIGRAM(S): 2 INJECTION INTRAMUSCULAR; INTRAVENOUS; SUBCUTANEOUS at 12:30

## 2023-01-08 RX ADMIN — HYDROMORPHONE HYDROCHLORIDE 2 MILLIGRAM(S): 2 INJECTION INTRAMUSCULAR; INTRAVENOUS; SUBCUTANEOUS at 06:53

## 2023-01-08 RX ADMIN — HYDROMORPHONE HYDROCHLORIDE 2 MILLIGRAM(S): 2 INJECTION INTRAMUSCULAR; INTRAVENOUS; SUBCUTANEOUS at 09:30

## 2023-01-08 RX ADMIN — HEPARIN SODIUM 5000 UNIT(S): 5000 INJECTION INTRAVENOUS; SUBCUTANEOUS at 05:55

## 2023-01-08 RX ADMIN — HYDROMORPHONE HYDROCHLORIDE 2 MILLIGRAM(S): 2 INJECTION INTRAMUSCULAR; INTRAVENOUS; SUBCUTANEOUS at 09:03

## 2023-01-08 RX ADMIN — Medication 300 UNIT(S): at 16:23

## 2023-01-08 RX ADMIN — Medication 100 MILLIGRAM(S): at 12:51

## 2023-01-08 RX ADMIN — HYDROMORPHONE HYDROCHLORIDE 2 MILLIGRAM(S): 2 INJECTION INTRAMUSCULAR; INTRAVENOUS; SUBCUTANEOUS at 12:52

## 2023-01-08 RX ADMIN — HYDROMORPHONE HYDROCHLORIDE 2 MILLIGRAM(S): 2 INJECTION INTRAMUSCULAR; INTRAVENOUS; SUBCUTANEOUS at 05:55

## 2023-01-08 RX ADMIN — LIDOCAINE 1 PATCH: 4 CREAM TOPICAL at 04:58

## 2023-01-08 RX ADMIN — HYDROMORPHONE HYDROCHLORIDE 2 MILLIGRAM(S): 2 INJECTION INTRAMUSCULAR; INTRAVENOUS; SUBCUTANEOUS at 16:03

## 2023-01-08 NOTE — DISCHARGE NOTE PROVIDER - ATTENDING DISCHARGE PHYSICAL EXAMINATION:
42y M with PMH of sickle cell anemia and gout, admitted with sickle cell crisis 2/2 generalized body pain that started the morning of admission. Admit for Sickle cell crisis, acute on chronic anemia, MATA. MATA resolved with IVF. Anemia improved with 4U pRBC transfusion. Followed by hematology. Patient with improvement of pain and reports comfortable going home with his normal pain regimen. Patient with elevated bilirubin and LFTs, similar to previous admission. He reports that he will follow up with his outpatient hematologist for further workup of elevated liver enzymes. DC home with outpatient follow up.  PHYSICAL  GENERAL: NAD, lying in bed comfortably  HEAD:  Atraumatic, Normocephalic  EYES: EOMI, PERRLA, (+) scleral icterus, new  ENT: Moist mucous membranes  NECK: Supple, No JVD  CHEST/LUNG: Clear to auscultation bilaterally; No rales, rhonchi, wheezing, or rubs. Unlabored respirations (+) right-sided chest port, dry and clean dressing   HEART: Regular rate and rhythm; No murmurs, rubs, or gallops  ABDOMEN: Bowel sounds present; Soft, Nontender, Nondistended.   EXTREMITIES:  2+ Peripheral Pulses, brisk capillary refill. No clubbing, cyanosis, or edema  NERVOUS SYSTEM:  Alert & Oriented X3, speech clear. No deficits   MSK: FROM all 4 extremities, full and equal strength  SKIN: No rashes or lesions

## 2023-01-08 NOTE — DISCHARGE NOTE PROVIDER - HOSPITAL COURSE
This is a 42M from home, with PMHx sickle cell disease w/ multiple hospitalizations for sickle cell complications (anemia and pain crisis) and gout p/w generalized body pain worsening x1d. He reports diffuse body pain since this morning, reports pain in bilateral shoulders, elbows, knees, hips, low back, and ribs, states symptoms are consistent with past sickle cell crises. Also reports b/l frontal HA and dizziness today, since improved upon arrival to . He states his last appointment with Dr. Khanna at [facility] 12/28 where he was found to have Hgb 5.5 and declined transfusion, requesting to defer until after the holidays. He denies any fevers, chills, and recent illnesses or sick contact. Noted to have scleral icterus on exam, denies any abdominal pain, N/V/D, as well as alcohol use. He reports taking folic acid and oxycodone 30mg at home q3-4h with little improvement. Reports not taking hydroxyurea for intolerance d/t priapism. Pt was tranfused 2uPRBC and Hgb went from 4.4 to 9.2. CTH negative for acute bleeds. Xray clear.    Given patient's improved clinical status and current hemodynamic stability, decision was made to discharge. Discussed with attending. Please refer to patient's complete medical chart with documents for a full hospital course, for this is only a brief summary.   This is a 42M from home, with PMHx sickle cell disease w/ multiple hospitalizations for sickle cell complications (anemia and pain crisis) and gout p/w generalized body pain worsening x1d. He reports diffuse body pain since this morning, reports pain in bilateral shoulders, elbows, knees, hips, low back, and ribs, states symptoms are consistent with past sickle cell crises. Also reports b/l frontal HA and dizziness today, since improved upon arrival to . He states his last appointment with Dr. Khanna at [facility] 12/28 where he was found to have Hgb 5.5 and declined transfusion, requesting to defer until after the holidays. He denies any fevers, chills, and recent illnesses or sick contact. Noted to have scleral icterus on exam, denies any abdominal pain, N/V/D, as well as alcohol use. He reports taking folic acid and oxycodone 30mg at home q3-4h with little improvement. Reports not taking hydroxyurea for intolerance d/t priapism. Pt was transfused 4uPRBC and Hgb went from 4.4 to 9.2. CTH negative for acute bleeds. Xray clear. Pt was followed by pain management and hematology while in the hospital. Pt received IVF and IV pain meds. Pt had elevated LFTs recommended to follow up with PCP.     Given patient's improved clinical status and current hemodynamic stability, decision was made to discharge. Discussed with attending. Please refer to patient's complete medical chart with documents for a full hospital course, for this is only a brief summary.

## 2023-01-08 NOTE — DISCHARGE NOTE PROVIDER - NSDCCPCAREPLAN_GEN_ALL_CORE_FT
PRINCIPAL DISCHARGE DIAGNOSIS  Diagnosis: Sickle cell pain crisis  Assessment and Plan of Treatment: You presented with a sickle cell pain crisis as you have had previously. You were treated with pain medication and given 2units of blood. Your pain resolved after the two units of blood were given and your Hemoglobin levels returned to an acceptable level.   Please continue to follow with your primary care physician for further care.      SECONDARY DISCHARGE DIAGNOSES  Diagnosis: Gout  Assessment and Plan of Treatment: You have a history of gout, you were continued on your medications while in the hospital. Please continue to take your medications as prescribed.   Please continue to follow with your primary care physician for further care.     PRINCIPAL DISCHARGE DIAGNOSIS  Diagnosis: Sickle cell pain crisis  Assessment and Plan of Treatment: You presented with a sickle cell pain crisis as you have had previously. You were treated with pain medication and given 4units of blood. Your pain resolved after the two units of blood were given and your Hemoglobin levels returned to an acceptable level. You were followed by pain management and hematology.   Please continue to follow with your primary care physician for further care.      SECONDARY DISCHARGE DIAGNOSES  Diagnosis: Transaminitis  Assessment and Plan of Treatment: You were found to have elevated liver enzymes which may indicate liver damage. You are requested to have a right upper quadrant ultrasound to further workup the cause of the elevated enzymes.   Please continue to follow with your primary care physician for further care.    Diagnosis: Gout  Assessment and Plan of Treatment: You have a history of gout, you were continued on your medications while in the hospital. Please continue to take your medications as prescribed.   Please continue to follow with your primary care physician for further care.

## 2023-01-08 NOTE — DISCHARGE NOTE PROVIDER - NSDCMRMEDTOKEN_GEN_ALL_CORE_FT
allopurinol 100 mg oral tablet: 1 tab(s) orally once a day  folic acid 1 mg oral tablet: 1 tab(s) orally 2 times a day  oxyCODONE 30 mg oral tablet: 1 tab(s) orally every 8 hours, As Needed  polyethylene glycol 3350 oral powder for reconstitution: 17 gram(s) orally once a day, As Needed - for constipation

## 2023-01-08 NOTE — DISCHARGE NOTE NURSING/CASE MANAGEMENT/SOCIAL WORK - PATIENT PORTAL LINK FT
You can access the FollowMyHealth Patient Portal offered by [facility] by registering at the following website: [email or web address]. By joining [employer]’s FollowMyHealth portal, you will also be able to view your health information using other applications (apps) compatible with our system.

## 2023-01-08 NOTE — DISCHARGE NOTE PROVIDER - CARE PROVIDER_API CALL
Chuck Jordan  INTERNAL MEDICINE  [address] [place] [address]  Phone: [phone]  Fax: [phone]  Follow Up Time:

## 2023-01-08 NOTE — DISCHARGE NOTE NURSING/CASE MANAGEMENT/SOCIAL WORK - NSDCPEFALRISK_GEN_ALL_CORE
For information on Fall & Injury Prevention, visit: [email or web address] OR  [email or web address] OR  https://www.cdc.gov/steadi/patient.html

## 2023-04-14 NOTE — ED ADULT NURSE NOTE - NSSEPSISSUSPECTED_ED_A_ED
Patient can discontinue carpal tunnel brace. Referral placed for OT. Advised to continue HEP. Avoid repetitive ROM of the hand and wrist, as able.     Continue icing and elevation as needed.     Follow up in 6 weeks. Call with changes or concerns.      No

## 2023-04-22 NOTE — PROVIDER CONTACT NOTE (CRITICAL VALUE NOTIFICATION) - DATE AND TIME:
Subjective   History of Present Illness  39-year-old male who presents to the emergency department chief complaint periumbilical abdominal pain worsening over the past several days.  Patient has had redness surrounding his umbilicus.  Patient does state had a periumbilical hernia that was reduced by primary care physician approximate 3 to 4 days ago.  Patient states he has had increased abdominal discomfort.    History provided by:  Patient   used: No    Abdominal Pain  Pain location:  Generalized  Pain quality: aching, sharp and stabbing    Pain radiates to:  Does not radiate  Pain severity:  Moderate  Onset quality:  Gradual  Duration:  2 days  Timing:  Intermittent  Progression:  Worsening  Chronicity:  New  Context: not awakening from sleep, not diet changes, not eating, not laxative use, not previous surgeries, not recent sexual activity, not recent travel, not sick contacts, not suspicious food intake and not trauma    Relieved by:  Nothing  Worsened by:  Nothing  Ineffective treatments:  None tried  Associated symptoms: chills, fatigue, nausea and vomiting    Associated symptoms: no chest pain, no cough, no diarrhea, no flatus, no hematemesis, no hematochezia, no hematuria and no shortness of breath    Risk factors: no alcohol abuse, no aspirin use, not elderly, has not had multiple surgeries, no NSAID use, not obese, not pregnant and no recent hospitalization        Review of Systems   Constitutional: Positive for chills and fatigue.   HENT: Negative.  Negative for drooling, ear discharge, ear pain, hearing loss, mouth sores and nosebleeds.    Eyes: Negative.  Negative for photophobia, pain and itching.   Respiratory: Negative.  Negative for cough, choking, chest tightness, shortness of breath and stridor.    Cardiovascular: Negative.  Negative for chest pain and leg swelling.   Gastrointestinal: Positive for abdominal distention, abdominal pain, nausea and vomiting. Negative for  diarrhea, flatus, hematemesis and hematochezia.   Endocrine: Negative.  Negative for heat intolerance, polydipsia, polyphagia and polyuria.   Genitourinary: Negative.  Negative for difficulty urinating, enuresis, flank pain, frequency, genital sores, hematuria, penile swelling and scrotal swelling.   Musculoskeletal: Positive for arthralgias. Negative for gait problem, joint swelling and myalgias.   Skin: Negative.  Negative for color change, pallor and rash.   Neurological: Negative.  Negative for seizures, speech difficulty, light-headedness, numbness and headaches.   Hematological: Negative.    Psychiatric/Behavioral: Negative.  Negative for confusion, decreased concentration, dysphoric mood and hallucinations. The patient is not hyperactive.    All other systems reviewed and are negative.      Past Medical History:   Diagnosis Date   • CHF (congestive heart failure)    • Degenerative joint disease    • Heart attack    • Hepatitis C        No Known Allergies    Past Surgical History:   Procedure Laterality Date   • AMPUTATION FINGER / THUMB Right        Family History   Problem Relation Age of Onset   • Depression Mother    • Heart disease Father    • Hyperlipidemia Father    • Hypertension Father        Social History     Socioeconomic History   • Marital status:    Tobacco Use   • Smoking status: Former     Packs/day: 0.00     Years: 20.00     Pack years: 0.00     Types: Cigarettes   • Smokeless tobacco: Former   • Tobacco comments:     last used 8 to 9 months ago   Vaping Use   • Vaping Use: Never used   Substance and Sexual Activity   • Alcohol use: No   • Drug use: Not Currently     Frequency: 7.0 times per week     Types: Methamphetamines, IV     Comment: clean 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   Constitutional:       General: He is not in acute distress.     Appearance: He is well-developed and normal  weight. He is not ill-appearing, toxic-appearing or diaphoretic.   HENT:      Head: Normocephalic and atraumatic.      Mouth/Throat:      Mouth: Mucous membranes are moist.      Pharynx: Oropharynx is clear. No pharyngeal swelling or oropharyngeal exudate.   Eyes:      General: No scleral icterus.     Extraocular Movements: Extraocular movements intact.      Pupils: Pupils are equal, round, and reactive to light.   Cardiovascular:      Rate and Rhythm: Normal rate and regular rhythm.      Heart sounds: Normal heart sounds. No murmur heard.    No friction rub. No gallop.   Pulmonary:      Effort: Pulmonary effort is normal. No respiratory distress.      Breath sounds: Normal breath sounds. No stridor. No wheezing, rhonchi or rales.   Chest:      Chest wall: No tenderness.   Abdominal:      General: Abdomen is flat, scaphoid and protuberant. Bowel sounds are normal. There is no distension or abdominal bruit. There are no signs of injury.      Palpations: Abdomen is soft. There is no shifting dullness, fluid wave, hepatomegaly, splenomegaly or mass.      Tenderness: There is generalized abdominal tenderness. There is no guarding or rebound. Negative signs include Galvan's sign, Rovsing's sign and McBurney's sign.      Hernia: A hernia is present. Hernia is present in the umbilical area. There is no hernia in the ventral area, left inguinal area, right femoral area, left femoral area or right inguinal area.   Genitourinary:     Testes:         Right: Mass, tenderness or swelling not present.         Left: Tenderness and swelling present. Mass not present.   Skin:     General: Skin is warm and dry.      Capillary Refill: Capillary refill takes less than 2 seconds.      Coloration: Skin is not cyanotic, jaundiced, mottled or pale.      Findings: Rash present. No erythema.      Comments: Redness to umbilicus    Neurological:      General: No focal deficit present.      Mental Status: He is alert and oriented to person,  place, and time.      Cranial Nerves: No cranial nerve deficit.      Motor: No weakness.   Psychiatric:         Mood and Affect: Mood normal. Mood is not anxious or depressed.         Behavior: Behavior normal.         Procedures           ED Course  ED Course as of 04/22/23 1558   Sat Apr 22, 2023   1548 1.  Diffuse fatty infiltration of the liver.  2.  Small umbilical hernia contains fat.  3.  Stranding noted affecting the herniated fat volume and there is  stranding identified in the subcutaneous fat surrounding the hernia with  associated skin thickening suggestive of edema and/or cellulitis related  changes.  4.  No abscess.  5.  No free air.  6.  No bowel or renal obstruction.  7.  Degenerative changes in the thoracic and lumbar spine with very mild  chronic grade 1 anterolisthesis of L5 on S1.  8.  Chronic bilateral L5 pars defects.  9.  Normal appendix.    []      ED Course User Index  [] David Masters PA-C                                           [facility]    Final diagnoses:   Cellulitis, unspecified cellulitis site   Periumbilical hernia       ED Disposition  ED Disposition     ED Disposition   Discharge    Condition   Stable    Comment   --             Ania Garcia DO  [address] [facility] [address] [phone]    Call in 1 day           Medication List      New Prescriptions    doxycycline 100 MG capsule  Commonly known as: MONODOX  Take 1 capsule by mouth 2 (Two) Times a Day for 10 days.           Where to Get Your Medications      You can get these medications from any pharmacy    Bring a paper prescription for each of these medications  · doxycycline 100 MG capsule          David Masters PA-C  04/22/23 1558     24-Jun-2022 08:26 24-Jun-2022 08:25

## 2023-05-19 ENCOUNTER — INPATIENT (INPATIENT)
Facility: HOSPITAL | Age: 43
LOS: 0 days | Discharge: HOME | DRG: 812 | End: 2023-05-20
Attending: INTERNAL MEDICINE | Admitting: INTERNAL MEDICINE
Payer: MEDICAID

## 2023-05-19 VITALS
OXYGEN SATURATION: 100 % | DIASTOLIC BLOOD PRESSURE: 85 MMHG | SYSTOLIC BLOOD PRESSURE: 137 MMHG | HEIGHT: 69 IN | WEIGHT: 194.01 LBS | TEMPERATURE: 98 F | HEART RATE: 80 BPM | RESPIRATION RATE: 18 BRPM

## 2023-05-19 DIAGNOSIS — D72.829 ELEVATED WHITE BLOOD CELL COUNT, UNSPECIFIED: ICD-10-CM

## 2023-05-19 DIAGNOSIS — Z29.9 ENCOUNTER FOR PROPHYLACTIC MEASURES, UNSPECIFIED: ICD-10-CM

## 2023-05-19 DIAGNOSIS — R06.02 SHORTNESS OF BREATH: ICD-10-CM

## 2023-05-19 DIAGNOSIS — Z90.49 ACQUIRED ABSENCE OF OTHER SPECIFIED PARTS OF DIGESTIVE TRACT: Chronic | ICD-10-CM

## 2023-05-19 DIAGNOSIS — D57.00 HB-SS DISEASE WITH CRISIS, UNSPECIFIED: ICD-10-CM

## 2023-05-19 DIAGNOSIS — N17.9 ACUTE KIDNEY FAILURE, UNSPECIFIED: ICD-10-CM

## 2023-05-19 DIAGNOSIS — D64.9 ANEMIA, UNSPECIFIED: ICD-10-CM

## 2023-05-19 LAB
ALBUMIN SERPL ELPH-MCNC: 3.5 G/DL — SIGNIFICANT CHANGE UP (ref 3.5–5)
ALP SERPL-CCNC: 118 U/L — SIGNIFICANT CHANGE UP (ref 40–120)
ALT FLD-CCNC: 77 U/L DA — HIGH (ref 10–60)
ANION GAP SERPL CALC-SCNC: 7 MMOL/L — SIGNIFICANT CHANGE UP (ref 5–17)
ANISOCYTOSIS BLD QL: SIGNIFICANT CHANGE UP
APPEARANCE UR: CLEAR — SIGNIFICANT CHANGE UP
APTT BLD: 68.6 SEC — HIGH (ref 27.5–35.5)
AST SERPL-CCNC: 165 U/L — HIGH (ref 10–40)
BASOPHILS # BLD AUTO: 0 K/UL — SIGNIFICANT CHANGE UP (ref 0–0.2)
BASOPHILS NFR BLD AUTO: 0 % — SIGNIFICANT CHANGE UP (ref 0–2)
BILIRUB DIRECT SERPL-MCNC: 3.7 MG/DL — HIGH (ref 0–0.3)
BILIRUB INDIRECT FLD-MCNC: 4.3 MG/DL — HIGH (ref 0.2–1)
BILIRUB SERPL-MCNC: 8 MG/DL — HIGH (ref 0.2–1.2)
BILIRUB SERPL-MCNC: 8 MG/DL — HIGH (ref 0.2–1.2)
BILIRUB UR-MCNC: NEGATIVE — SIGNIFICANT CHANGE UP
BLD GP AB SCN SERPL QL: SIGNIFICANT CHANGE UP
BUN SERPL-MCNC: 26 MG/DL — HIGH (ref 7–18)
CALCIUM SERPL-MCNC: 8.6 MG/DL — SIGNIFICANT CHANGE UP (ref 8.4–10.5)
CHLORIDE SERPL-SCNC: 107 MMOL/L — SIGNIFICANT CHANGE UP (ref 96–108)
CO2 SERPL-SCNC: 21 MMOL/L — LOW (ref 22–31)
COLOR SPEC: YELLOW — SIGNIFICANT CHANGE UP
CREAT SERPL-MCNC: 1.79 MG/DL — HIGH (ref 0.5–1.3)
DIFF PNL FLD: ABNORMAL
EGFR: 47 ML/MIN/1.73M2 — LOW
EOSINOPHIL # BLD AUTO: 0.23 K/UL — SIGNIFICANT CHANGE UP (ref 0–0.5)
EOSINOPHIL NFR BLD AUTO: 2 % — SIGNIFICANT CHANGE UP (ref 0–6)
GLUCOSE BLDC GLUCOMTR-MCNC: 142 MG/DL — HIGH (ref 70–99)
GLUCOSE SERPL-MCNC: 133 MG/DL — HIGH (ref 70–99)
GLUCOSE UR QL: NEGATIVE — SIGNIFICANT CHANGE UP
HCT VFR BLD CALC: 14 % — CRITICAL LOW (ref 39–50)
HCT VFR BLD CALC: 19.2 % — CRITICAL LOW (ref 39–50)
HGB BLD-MCNC: 5.1 G/DL — CRITICAL LOW (ref 13–17)
HGB BLD-MCNC: 6.9 G/DL — CRITICAL LOW (ref 13–17)
HYPOCHROMIA BLD QL: SIGNIFICANT CHANGE UP
INR BLD: 1.18 RATIO — HIGH (ref 0.88–1.16)
KETONES UR-MCNC: NEGATIVE — SIGNIFICANT CHANGE UP
LDH SERPL L TO P-CCNC: 1081 U/L — HIGH (ref 120–225)
LEUKOCYTE ESTERASE UR-ACNC: NEGATIVE — SIGNIFICANT CHANGE UP
LYMPHOCYTES # BLD AUTO: 45 % — HIGH (ref 13–44)
LYMPHOCYTES # BLD AUTO: 5.18 K/UL — HIGH (ref 1–3.3)
MANUAL SMEAR VERIFICATION: SIGNIFICANT CHANGE UP
MCHC RBC-ENTMCNC: 32.5 PG — SIGNIFICANT CHANGE UP (ref 27–34)
MCHC RBC-ENTMCNC: 34 PG — SIGNIFICANT CHANGE UP (ref 27–34)
MCHC RBC-ENTMCNC: 35.9 GM/DL — SIGNIFICANT CHANGE UP (ref 32–36)
MCHC RBC-ENTMCNC: 36.4 GM/DL — HIGH (ref 32–36)
MCV RBC AUTO: 90.6 FL — SIGNIFICANT CHANGE UP (ref 80–100)
MCV RBC AUTO: 93.3 FL — SIGNIFICANT CHANGE UP (ref 80–100)
MONOCYTES # BLD AUTO: 0.92 K/UL — HIGH (ref 0–0.9)
MONOCYTES NFR BLD AUTO: 8 % — SIGNIFICANT CHANGE UP (ref 2–14)
NEUTROPHILS NFR BLD AUTO: 43 % — SIGNIFICANT CHANGE UP (ref 43–77)
NITRITE UR-MCNC: NEGATIVE — SIGNIFICANT CHANGE UP
NRBC # BLD: 1 /100 WBCS — HIGH (ref 0–0)
NRBC # BLD: 1 /100 — HIGH (ref 0–0)
PH UR: 6 — SIGNIFICANT CHANGE UP (ref 5–8)
PLAT MORPH BLD: NORMAL — SIGNIFICANT CHANGE UP
PLATELET # BLD AUTO: 244 K/UL — SIGNIFICANT CHANGE UP (ref 150–400)
PLATELET # BLD AUTO: 285 K/UL — SIGNIFICANT CHANGE UP (ref 150–400)
PLATELET COUNT - ESTIMATE: NORMAL — SIGNIFICANT CHANGE UP
POIKILOCYTOSIS BLD QL AUTO: SIGNIFICANT CHANGE UP
POLYCHROMASIA BLD QL SMEAR: SLIGHT — SIGNIFICANT CHANGE UP
POTASSIUM SERPL-MCNC: 3.9 MMOL/L — SIGNIFICANT CHANGE UP (ref 3.5–5.3)
POTASSIUM SERPL-SCNC: 3.9 MMOL/L — SIGNIFICANT CHANGE UP (ref 3.5–5.3)
PROT SERPL-MCNC: 7.3 G/DL — SIGNIFICANT CHANGE UP (ref 6–8.3)
PROT UR-MCNC: 100 MG/DL
PROTHROM AB SERPL-ACNC: 14.1 SEC — HIGH (ref 10.5–13.4)
RBC # BLD: 1.5 M/UL — LOW (ref 4.2–5.8)
RBC # BLD: 1.5 M/UL — LOW (ref 4.2–5.8)
RBC # BLD: 2.12 M/UL — LOW (ref 4.2–5.8)
RBC # FLD: 22.5 % — HIGH (ref 10.3–14.5)
RBC # FLD: 23.4 % — HIGH (ref 10.3–14.5)
RBC BLD AUTO: ABNORMAL
RETICS #: 216.9 K/UL — HIGH (ref 25–125)
RETICS/RBC NFR: 14.5 % — HIGH (ref 0.5–2.5)
SCHISTOCYTES BLD QL AUTO: SLIGHT — SIGNIFICANT CHANGE UP
SICKLE CELLS BLD QL SMEAR: SIGNIFICANT CHANGE UP
SMUDGE CELLS # BLD: PRESENT — SIGNIFICANT CHANGE UP
SODIUM SERPL-SCNC: 135 MMOL/L — SIGNIFICANT CHANGE UP (ref 135–145)
SP GR SPEC: 1.01 — SIGNIFICANT CHANGE UP (ref 1.01–1.02)
STOMATOCYTES BLD QL SMEAR: SLIGHT — SIGNIFICANT CHANGE UP
TARGETS BLD QL SMEAR: SLIGHT — SIGNIFICANT CHANGE UP
UROBILINOGEN FLD QL: 1 MG/DL
VARIANT LYMPHS # BLD: 2 % — SIGNIFICANT CHANGE UP (ref 0–6)
WBC # BLD: 11.51 K/UL — HIGH (ref 3.8–10.5)
WBC # BLD: 7.62 K/UL — SIGNIFICANT CHANGE UP (ref 3.8–10.5)
WBC # FLD AUTO: 11.51 K/UL — HIGH (ref 3.8–10.5)
WBC # FLD AUTO: 7.62 K/UL — SIGNIFICANT CHANGE UP (ref 3.8–10.5)

## 2023-05-19 PROCEDURE — 76700 US EXAM ABDOM COMPLETE: CPT | Mod: 26

## 2023-05-19 PROCEDURE — 99222 1ST HOSP IP/OBS MODERATE 55: CPT

## 2023-05-19 PROCEDURE — 71046 X-RAY EXAM CHEST 2 VIEWS: CPT | Mod: 26

## 2023-05-19 PROCEDURE — 99285 EMERGENCY DEPT VISIT HI MDM: CPT

## 2023-05-19 RX ORDER — SODIUM CHLORIDE 9 MG/ML
1000 INJECTION INTRAMUSCULAR; INTRAVENOUS; SUBCUTANEOUS ONCE
Refills: 0 | Status: COMPLETED | OUTPATIENT
Start: 2023-05-19 | End: 2023-05-19

## 2023-05-19 RX ORDER — HYDROMORPHONE HYDROCHLORIDE 2 MG/ML
2 INJECTION INTRAMUSCULAR; INTRAVENOUS; SUBCUTANEOUS ONCE
Refills: 0 | Status: DISCONTINUED | OUTPATIENT
Start: 2023-05-19 | End: 2023-05-19

## 2023-05-19 RX ORDER — ACETAMINOPHEN 500 MG
1000 TABLET ORAL ONCE
Refills: 0 | Status: COMPLETED | OUTPATIENT
Start: 2023-05-19 | End: 2023-05-19

## 2023-05-19 RX ORDER — ACETAMINOPHEN 500 MG
975 TABLET ORAL ONCE
Refills: 0 | Status: COMPLETED | OUTPATIENT
Start: 2023-05-19 | End: 2023-05-19

## 2023-05-19 RX ORDER — POLYETHYLENE GLYCOL 3350 17 G/17G
17 POWDER, FOR SOLUTION ORAL DAILY
Refills: 0 | Status: DISCONTINUED | OUTPATIENT
Start: 2023-05-19 | End: 2023-05-20

## 2023-05-19 RX ORDER — OXYCODONE HYDROCHLORIDE 5 MG/1
30 TABLET ORAL EVERY 8 HOURS
Refills: 0 | Status: DISCONTINUED | OUTPATIENT
Start: 2023-05-19 | End: 2023-05-19

## 2023-05-19 RX ORDER — ALLOPURINOL 300 MG
100 TABLET ORAL DAILY
Refills: 0 | Status: DISCONTINUED | OUTPATIENT
Start: 2023-05-19 | End: 2023-05-20

## 2023-05-19 RX ORDER — LIDOCAINE 4 G/100G
2 CREAM TOPICAL DAILY
Refills: 0 | Status: DISCONTINUED | OUTPATIENT
Start: 2023-05-19 | End: 2023-05-20

## 2023-05-19 RX ORDER — SENNA PLUS 8.6 MG/1
2 TABLET ORAL AT BEDTIME
Refills: 0 | Status: DISCONTINUED | OUTPATIENT
Start: 2023-05-19 | End: 2023-05-20

## 2023-05-19 RX ORDER — OXYCODONE HYDROCHLORIDE 5 MG/1
30 TABLET ORAL EVERY 12 HOURS
Refills: 0 | Status: DISCONTINUED | OUTPATIENT
Start: 2023-05-19 | End: 2023-05-19

## 2023-05-19 RX ORDER — HYDROMORPHONE HYDROCHLORIDE 2 MG/ML
1 INJECTION INTRAMUSCULAR; INTRAVENOUS; SUBCUTANEOUS ONCE
Refills: 0 | Status: DISCONTINUED | OUTPATIENT
Start: 2023-05-19 | End: 2023-05-19

## 2023-05-19 RX ORDER — DIPHENHYDRAMINE HCL 50 MG
25 CAPSULE ORAL ONCE
Refills: 0 | Status: COMPLETED | OUTPATIENT
Start: 2023-05-19 | End: 2023-05-19

## 2023-05-19 RX ORDER — HYDROMORPHONE HYDROCHLORIDE 2 MG/ML
2 INJECTION INTRAMUSCULAR; INTRAVENOUS; SUBCUTANEOUS
Refills: 0 | Status: DISCONTINUED | OUTPATIENT
Start: 2023-05-19 | End: 2023-05-20

## 2023-05-19 RX ORDER — FOLIC ACID 0.8 MG
1 TABLET ORAL EVERY 24 HOURS
Refills: 0 | Status: DISCONTINUED | OUTPATIENT
Start: 2023-05-19 | End: 2023-05-20

## 2023-05-19 RX ADMIN — HYDROMORPHONE HYDROCHLORIDE 2 MILLIGRAM(S): 2 INJECTION INTRAMUSCULAR; INTRAVENOUS; SUBCUTANEOUS at 16:05

## 2023-05-19 RX ADMIN — LIDOCAINE 2 PATCH: 4 CREAM TOPICAL at 13:12

## 2023-05-19 RX ADMIN — Medication 25 MILLIGRAM(S): at 06:34

## 2023-05-19 RX ADMIN — Medication 100 MILLIGRAM(S): at 11:54

## 2023-05-19 RX ADMIN — HYDROMORPHONE HYDROCHLORIDE 2 MILLIGRAM(S): 2 INJECTION INTRAMUSCULAR; INTRAVENOUS; SUBCUTANEOUS at 23:02

## 2023-05-19 RX ADMIN — Medication 1 MILLIGRAM(S): at 11:54

## 2023-05-19 RX ADMIN — Medication 25 MILLIGRAM(S): at 20:56

## 2023-05-19 RX ADMIN — HYDROMORPHONE HYDROCHLORIDE 2 MILLIGRAM(S): 2 INJECTION INTRAMUSCULAR; INTRAVENOUS; SUBCUTANEOUS at 12:24

## 2023-05-19 RX ADMIN — HYDROMORPHONE HYDROCHLORIDE 2 MILLIGRAM(S): 2 INJECTION INTRAMUSCULAR; INTRAVENOUS; SUBCUTANEOUS at 16:35

## 2023-05-19 RX ADMIN — HYDROMORPHONE HYDROCHLORIDE 2 MILLIGRAM(S): 2 INJECTION INTRAMUSCULAR; INTRAVENOUS; SUBCUTANEOUS at 11:54

## 2023-05-19 RX ADMIN — LIDOCAINE 2 PATCH: 4 CREAM TOPICAL at 19:00

## 2023-05-19 RX ADMIN — HYDROMORPHONE HYDROCHLORIDE 2 MILLIGRAM(S): 2 INJECTION INTRAMUSCULAR; INTRAVENOUS; SUBCUTANEOUS at 08:05

## 2023-05-19 RX ADMIN — Medication 1000 MILLIGRAM(S): at 13:11

## 2023-05-19 RX ADMIN — HYDROMORPHONE HYDROCHLORIDE 2 MILLIGRAM(S): 2 INJECTION INTRAMUSCULAR; INTRAVENOUS; SUBCUTANEOUS at 05:55

## 2023-05-19 RX ADMIN — HYDROMORPHONE HYDROCHLORIDE 2 MILLIGRAM(S): 2 INJECTION INTRAMUSCULAR; INTRAVENOUS; SUBCUTANEOUS at 19:18

## 2023-05-19 RX ADMIN — HYDROMORPHONE HYDROCHLORIDE 1 MILLIGRAM(S): 2 INJECTION INTRAMUSCULAR; INTRAVENOUS; SUBCUTANEOUS at 03:00

## 2023-05-19 RX ADMIN — HYDROMORPHONE HYDROCHLORIDE 2 MILLIGRAM(S): 2 INJECTION INTRAMUSCULAR; INTRAVENOUS; SUBCUTANEOUS at 22:11

## 2023-05-19 RX ADMIN — Medication 975 MILLIGRAM(S): at 06:41

## 2023-05-19 RX ADMIN — HYDROMORPHONE HYDROCHLORIDE 1 MILLIGRAM(S): 2 INJECTION INTRAMUSCULAR; INTRAVENOUS; SUBCUTANEOUS at 09:26

## 2023-05-19 RX ADMIN — HYDROMORPHONE HYDROCHLORIDE 1 MILLIGRAM(S): 2 INJECTION INTRAMUSCULAR; INTRAVENOUS; SUBCUTANEOUS at 04:09

## 2023-05-19 RX ADMIN — HYDROMORPHONE HYDROCHLORIDE 1 MILLIGRAM(S): 2 INJECTION INTRAMUSCULAR; INTRAVENOUS; SUBCUTANEOUS at 09:56

## 2023-05-19 RX ADMIN — HYDROMORPHONE HYDROCHLORIDE 2 MILLIGRAM(S): 2 INJECTION INTRAMUSCULAR; INTRAVENOUS; SUBCUTANEOUS at 19:48

## 2023-05-19 RX ADMIN — SODIUM CHLORIDE 1000 MILLILITER(S): 9 INJECTION INTRAMUSCULAR; INTRAVENOUS; SUBCUTANEOUS at 03:00

## 2023-05-19 RX ADMIN — Medication 975 MILLIGRAM(S): at 08:05

## 2023-05-19 RX ADMIN — Medication 1000 MILLIGRAM(S): at 13:41

## 2023-05-19 RX ADMIN — Medication 25 MILLIGRAM(S): at 11:59

## 2023-05-19 NOTE — H&P ADULT - HISTORY OF PRESENT ILLNESS
Patient is a 43M from home, with PMHx sickle cell disease w/ multiple hospitalizations for sickle cell complications (anemia and pain crisis) and gout p/w generalized body pain worsening x1d. Patient reports that he has lower back pain radiating towards his legs 8/10 in severity and constant over the last 24 hrs. Patient also complains that he has bilateral arm and leg pain. Patient states that he normally gets this pain when he has a sickle cell pain crisis. Patient also reports he follows with Dr. Jordan (outpatient oncologist) and his outpatient provider referred him to come to ED due to yellowing of his skin and anemia. Patient reports dizziness and shortness or breath on exertion. Denies chest pain, syncope. Denies cough, fevers or chills. Denies abdominal or urinary symptoms. Denies melena, hematochezia, hematuria or any signs of bleeding. '    Of note patient discharged [facility] on 1/8/23 due to similar complaints. At that time patient found to have hgb of 5 requiring 4 units of PRBCs at the time.

## 2023-05-19 NOTE — CONSULT NOTE ADULT - PROBLEM SELECTOR RECOMMENDATION 3
likely secondary to severe anemia  oxygen supp  R/o underlying bronchospasm  Bronchodilators prn  pFTs as OP

## 2023-05-19 NOTE — H&P ADULT - ATTENDING COMMENTS
43M from home, with PMHx sickle cell disease w/ multiple hospitalizations for sickle cell complications (anemia and pain crisis) and gout p/w generalized body pain worsening x1d. Patient reports that he has lower back pain radiating towards his legs 8/10 in severity and constant over the last 24 hrs. Patient also complains that he has bilateral arm and leg pain. Patient states that he normally gets this pain when he has a sickle cell pain crisis. Patient also reports he follows with Dr. Jordan (outpatient oncologist) and his outpatient provider referred him to come to ED due to yellowing of his skin and anemia. Patient reports dizziness and shortness or breath on exertion. Denies chest pain, syncope. Denies cough, fevers or chills. Denies abdominal or urinary symptoms. Denies melena, hematochezia, hematuria or any signs of bleeding. '    Of note patient discharged from [facility] on 1/8/23 due to similar complaints. At that time patient found to have hgb of 5 requiring 4 units of PRBCs at the time.        assessment  --  sickle brenda pain crisis, anemia 2nd sickle cell disease, aretha, atelectasis, reactive leukocytosis, h/o sickle cell disease, gout and cholecystectomy    plan  --  admit to med,  ivf, transfuse 2 unit prbc, cont pain control, incentive spirometer, cont preadmit home meds, gi and dvt prophylaxis    pain mgmt cons    cbc, bmp, mg, phos, lipids, tsh    abd us    pain mgmt cons  heme onc cons    pulm cons

## 2023-05-19 NOTE — ED ADULT NURSE REASSESSMENT NOTE - NS ED NURSE REASSESS COMMENT FT1
blood transfusion finished at 8:45am /no reaction noted /pt move to holding /report given to rn /barrett

## 2023-05-19 NOTE — H&P ADULT - PROBLEM SELECTOR PLAN 2
- patient with multiple complaints of pain throughout body  - noted to have scleral icterus on physical examination  - noted to have bili of 8, direct bili of 3.7, LDH of 1081, with slight schistocytes on PBS, concerning for hemolysis   - admit to medicine  - transfuse 2 unit PRBCs  - IVF hydration   - pain control with oxycodone   - Pain management consult

## 2023-05-19 NOTE — ED PROVIDER NOTE - OBJECTIVE STATEMENT
43M, [facility] of sickle cell disease, presenting with pain. patient reports for the past 2-3 days he has had increasing weakness. today he developed worsening pain throughout he day as well as shortness of breath with walking. family members believe he appears more yellow than usual. went to his hematologist today and was told me may need a blood transfusion. no fever, chest pain, trouble breathing. reports pain in shoulders and legs.

## 2023-05-19 NOTE — H&P ADULT - NSHPSOCIALHISTORY_GEN_ALL_CORE
Patient does not work on disability. Denies illicit drug use, reports occasional THC use. Lives with cousin.

## 2023-05-19 NOTE — H&P ADULT - PROBLEM SELECTOR PLAN 1
- patient presenting with hbg of 5.1 (baseline around 7)  - hx of multiple blood transfusions in the past  - not actively bleeding, can be 2/2 sickle cell disease  - admit to medicine  - keep active type and screen   - will transfuse 2 unit PRBCs  - SCD for DVT prophylaxis   - transfuse hgb>7   - resume diet   - f/u post transfusion cbc   - Dr. Jordan Heme/ Onc consulted
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
## 2023-05-19 NOTE — ED PROVIDER NOTE - PHYSICAL EXAMINATION
General: well appearing male, no acute distress   HEENT: normocephalic, atraumatic, scleral  icterus   Respiratory: normal work of breathing, lungs clear to auscultation bilaterally   Cardiac: regular rate and rhythm   Abdomen: soft, non-tender, no guarding or rebound   MSK: no swelling or tenderness of lower extremities, moving all extremities spontaneously   Skin: warm, dry, jaundiced   Neuro: A&Ox3  Psych: appropriate affect

## 2023-05-19 NOTE — CONSULT NOTE ADULT - SUBJECTIVE AND OBJECTIVE BOX
PULMONARY CONSULT NOTE      TOLU VARGAS  MRN-[id number]    History of Present Illness:  Reason for Admission: Symptomatic Anemia/ Sickle Cell Pain crisis  History of Present Illness:   Patient is a 43M from home, with PMHx sickle cell disease w/ multiple hospitalizations for sickle cell complications (anemia and pain crisis) and gout p/w generalized body pain worsening x1d. Patient reports that he has lower back pain radiating towards his legs 8/10 in severity and constant over the last 24 hrs. Patient also complains that he has bilateral arm and leg pain. Patient states that he normally gets this pain when he has a sickle cell pain crisis. Patient also reports he follows with Dr. Jordan (outpatient oncologist) and his outpatient provider referred him to come to ED due to yellowing of his skin and anemia. Patient reports dizziness and shortness or breath on exertion. Denies chest pain, syncope. Denies cough, fevers or chills. Denies abdominal or urinary symptoms. Denies melena, hematochezia, hematuria or any signs of bleeding. '    Of note patient discharged from [facility] on 1/8/23 due to similar complaints. At that time patient found to have hgb of 5 requiring 4 units of PRBCs at the time.          HISTORY OF PRESENT ILLNESS: As Above. Awake, alert, comfortable in bed in NAD. Currently on oxygen sup via NC    MEDICATIONS  (STANDING):  allopurinol 100 milliGRAM(s) Oral daily  folic acid 1 milliGRAM(s) Oral every 24 hours  oxyCODONE  ER Tablet 30 milliGRAM(s) Oral every 8 hours      MEDICATIONS  (PRN):      Allergies    piperacillin-tazobactam (Urticaria)  penicillin (Pruritus)  hydroxyurea (Other)  ceftriaxone (Anaphylaxis)    Intolerances        PAST MEDICAL & SURGICAL HISTORY:  Sickle cell anemia      Gout      History of cholecystectomy          FAMILY HISTORY:  Family history of sickle cell trait (Father, Mother)        SOCIAL HISTORY  Smoking History:     REVIEW OF SYSTEMS:    CONSTITUTIONAL:  No fevers, chills, sweats    HEENT:  Eyes:  No diplopia or blurred vision. ENT:  No earache, sore throat or runny nose.    CARDIOVASCULAR:  No pressure, squeezing, tightness, or heaviness about the chest; no palpitations.    RESPIRATORY:  Per HPI    GASTROINTESTINAL:  No abdominal pain, nausea, vomiting or diarrhea.    GENITOURINARY:  No dysuria, frequency or urgency.    NEUROLOGIC:  No paresthesias, fasciculations, seizures or weakness.    PSYCHIATRIC:  No disorder of thought or mood.    Vital Signs Last 24 Hrs  T(C): 36.4 (19 May 2023 08:55), Max: 36.8 (19 May 2023 02:24)  T(F): 97.5 (19 May 2023 08:55), Max: 98.2 (19 May 2023 02:24)  HR: 64 (19 May 2023 08:55) (64 - 89)  BP: 126/77 (19 May 2023 08:55) (116/69 - 137/85)  BP(mean): --  RR: 18 (19 May 2023 08:55) (14 - 18)  SpO2: 95% (19 May 2023 08:55) (92% - 100%)    Parameters below as of 19 May 2023 08:55  Patient On (Oxygen Delivery Method): nasal cannula  O2 Flow (L/min): 2    I&O's Detail      PHYSICAL EXAMINATION:    GENERAL: The patient is a well-developed, well-nourished _____in no apparent distress.     HEENT: Head is normocephalic and atraumatic. Extraocular muscles are intact. Mucous membranes are moist.     NECK: Supple.     LUNGS: Clear to auscultation without wheezing, rales, or rhonchi. Respirations unlabored    HEART: Regular rate and rhythm without murmur.    ABDOMEN: Soft, nontender, and nondistended.  No hepatosplenomegaly is noted.    EXTREMITIES: Without any cyanosis, clubbing, rash, lesions or edema.    NEUROLOGIC: Grossly intact.      LABS:                        5.1    11.51 )-----------( 285      ( 19 May 2023 02:40 )             14.0     05-19    135  |  107  |  26<H>  ----------------------------<  133<H>  3.9   |  21<L>  |  1.79<H>    Ca    8.6      19 May 2023 02:40    TPro  7.3  /  Alb  3.5  /  TBili  8.0<H>  /  DBili  3.7<H>  /  AST  165<H>  /  ALT  77<H>  /  AlkPhos  118  05-19    PT/INR - ( 19 May 2023 02:40 )   PT: 14.1 sec;   INR: 1.18 ratio         PTT - ( 19 May 2023 02:40 )  PTT:68.6 sec                    MICROBIOLOGY:    RADIOLOGY & ADDITIONAL STUDIES:    CXR:    Ct scan chest;    ekg;    echo:

## 2023-05-19 NOTE — ED ADULT NURSE NOTE - NSFALLUNIVINTERV_ED_ALL_ED
Bed/Stretcher in lowest position, wheels locked, appropriate side rails in place/Call bell, personal items and telephone in reach/Instruct patient to call for assistance before getting out of bed/chair/stretcher/Non-slip footwear applied when patient is off stretcher/[place] to call system/Physically safe environment - no spills, clutter or unnecessary equipment/Purposeful proactive rounding/Room/bathroom lighting operational, light cord in reach

## 2023-05-19 NOTE — ED ADULT NURSE NOTE - OBJECTIVE STATEMENT
The patient is a 30y Male complaining of urinary/bladder trouble. Pt a&ox3, in ED for SSC, c/o body pain (ribs, back, arms & legs), denies CP, no SOB, unlabored breathing, equal rise & fall, able to speak in full sentences, PMH of Sickle Cell & Gout.

## 2023-05-19 NOTE — PATIENT PROFILE ADULT - HOME ACCESSIBILITY CONCERNS
"[facility]  Palliative Care Daily Progress Note      Code status: No CPR- Do NOT Intubate     Impressions, Recommendations & Counseling     SYMPTOM ASSESSMENT:  1.  Generalized weakness and fatigue   - PT consulted.  - Likely to benefit from TCU at discharge.     2.  Slow transition constipation - No BM x 2 days  - ADD Senna 2 tabs po this AM then 1 tab po BID  - Continue Miralax 17 gram po BID prn (good as a softener. Lacks stimulant properties; patient needs both).  - normally eats \"prune soup\" routinely at home.  - Bisacodyl suppository pr daily prn; given this AM.     ADVANCED CARE PLANNING  - Code status: FULL CODE;  Now DNR/I per discussion today.  - Surrogate decision makers: DTRS, Juliet Petersen and Justa Petersen  - Has HCD in chart.  - No POLST.  - POLST completed 1/27/2023 indicating wishes for DNR/I, selective treatment, no feeding tube and agreeable to oral antibiotics.     GOALS OF CARE DISCUSSION  - Goals are life prolonging with limits, now DNR/I.  - Continue current cares and treatments.  - Likely to discharge to TCU for strengthening. Patient and family agree with this plan.  - Discussed role of outpatient palliative care clinic. Family and patient will consider for ongoing goals of care discussions. Referral placed.        HPI          Reymundochuck Petersen is a 83 year old male with PMH persistent atrial fibrillation, chronic systolic and diastolic HFrEF (35-40 %) due to nonischemic cardiomyopathy, moderate Complete heart block, s/p CRT-P, s/p ablation and DC cardioversion, CAD with prior RCA PCI, HTN, BPH and CKD stage 2  Patient admitted from home where he lives with family with increased fatigue, dehydration and confusion.     Patient was seen for:  Goals of care discussion and advanced care planning    Prognosis, Goals, or Advance Care Planning was addressed today with: Yes.  Mood, coping, and/or meaning in the context of serious illness were addressed today: Yes.            " "Interval History:     Chart review/discussion with unit or clinical team members:   Afebrile. VSS. NAD.  Fatigue continue and slowly improving. No BM in 2 days.  Esophogram demonstrates moderate esophageal dysmotility, no strictures.  Recommendations: \"eat sitting upright, take small bites, chew completely, drink sips of liquid in between bites, and eat slowly.\"    Per patient or family/caregivers today:  Justa ORTIZ, in room assisting patient. Main complaint is constipation as no BM x2 days.  Agrees with TCU at discharge and to follow up in palliative care clinic as outpatient.    Key Palliative Symptoms:  # Pain severity the last 12 hours: none  # Dyspnea severity the last 12 hours: low  # Nausea severity the last 12 hours: none  # Anxiety severity the last 12 hours: none  Dyspnea worsens with exertions  Weakness: Moderate- severe  Constipation: Yes, no BM in 2 days  Appetite: reduced           Review of Systems:     Additional ROS was reviewed and is unremarkable.          Medications:     I have reviewed this patient's medication profile and medications during this hospitalization.           Physical Exam:   Temp:  [97.4  F (36.3  C)-97.6  F (36.4  C)] 97.4  F (36.3  C)  Pulse:  [65-72] 72  Resp:  [18-19] 18  BP: ()/(59-65) 115/65  SpO2:  [94 %-99 %] 94 %  General appearance: alert, cachectic, cooperative and no distress  Head: Normocephalic, atraumatic, temopral wasting noted  Eyes: lids and lashes normal. Sclera anicteric  Nose: no discharge  Throat: lips without lesions. Oral mucosa moist.  Lungs: non-labored at rest in bed. No accessory muscle use noted.  Heart: regular rate  Abdomen: soft, non-tender, non-distended  Extremities: warm, no edema noted  Neurologic: alert. Oriented x4           Data Reviewed:     Pertinent Labs  Lab Results: personally reviewed.   Lab Results   Component Value Date     01/30/2023    CO2 37 01/30/2023    CO2 30 10/17/2022    BUN 18.6 01/30/2023    BUN 27 10/17/2022 "     Lab Results   Component Value Date    WBC 8.9 01/28/2023    HGB 12.3 01/28/2023    HCT 40.5 01/28/2023    MCV 98 01/28/2023     01/28/2023     AST   Date Value Ref Range Status   12/20/2022 24 10 - 50 U/L Final     ALT   Date Value Ref Range Status   12/20/2022 7 (L) 10 - 50 U/L Final     Alkaline Phosphatase   Date Value Ref Range Status   12/20/2022 102 40 - 129 U/L Final     Albumin   Date Value Ref Range Status   01/24/2023 3.0 (L) 3.5 - 5.2 g/dL Final   03/14/2022 3.4 (L) 3.5 - 5.0 g/dL Final         Radiology Results  XR Chest 2 Views    Result Date: 1/23/2023  EXAM: XR CHEST 2 VIEWS LOCATION: [facility] DATE/TIME: 1/23/2023 6:33 PM INDICATION: Shortness of breath. COMPARISON: Chest CT 02/22/2022. Chest radiograph 02/22/2022.     IMPRESSION: Small to moderate bilateral pleural effusions, larger on the right. There are adjacent airspace opacities which likely reflect atelectasis, although infection is not excluded. Upper lungs are clear. No pneumothorax. Stable enlarged cardiac silhouette. Unchanged right subclavian approach pacemaker.    Cardiac Catheterization    Result Date: 1/25/2023  Written informed consent was obtained in the pre-procedure area. A time out procedure was performed with the entirecardiac catheterization laboratory staff, confirming the patient's name, date of birth, type of procedure, indication for the procedure and site of procedure.  The right IJ was sterilely prepped and draped. 1% Lidocaine wasused to infiltrate the right neck. Sheath was placed into the right internal jugular vein using modified Seldinger technique. The sheath was then upsized to a 12 F sheath. A 7 Fr balloon wedge pressure catheter was advancedthrough the introducer sheath to perform pressure measurements throughout the right heart. Following pressure measurements in the RA 15, RV 50/5; RVEDP 15 , PA 54/20 mean 30 and Wedge 20. A SV02 sample was drawn from the pulmonary artery and  found to be 63%.  The cardiac output and index were determined to be 4.62/2.41, respectively by Angela method.  The balloon wedge pressure catheter was removed.The sheath was removed and hemostasis of the access achieved via manual pressure. The patient tolerated the procedure well without complications.    XR Chest Port 1 View    Result Date: 1/24/2023  EXAM: XR CHEST PORT 1 VIEW LOCATION: [facility] DATE/TIME: 1/24/2023 8:18 AM INDICATION: RN placed PICC   verify tip placement COMPARISON: 01/23/2023 at 1824 hours.     IMPRESSION: There is a new PICC catheter from a left upper extremity approach with tip in the superior vena cava near its junction with the right atrium. There is no pneumothorax. Bilateral pleural effusions mild cardiomegaly are unchanged. Right subclavian approach pacemaker with leads over the right atrium, right ventricle and coronary sinus is unchanged. Bibasilar atelectasis or consolidation is stable. There are degenerative changes in both shoulders.    Cardiac Device Check - Remote (Standing ORD 5 count)    Result Date: 1/2/2023  Type: remote CRT-P transmission done prior to clinic appointment with Marek STANTON CNP. Done as a courtesy check. Combined totals from unscheduled remote 12/12/22 and today. Device: Medtronic Percepta Pacing%/Programmed: AP 50%, biVP 99.4%, in DDDR  ppm mode. Lead(s): stable Battery longevity: estimated 4 yrs 7 mos. Presenting: AT/AFL with alternate P waves falling in blanking, biV pacing rate 95 bpm. Atrial arrhythmias: since 11/23/22, 237 mode switches, burden 1% (may be higher r/t undersensing), EGMs shows AT/AFL with intermittent atrial undersensing and occasional atrial lead noise. Anticoagulant: Xarelto Ventricular arrhythmias: since 11/23/22, three NSVT, longest 13 bts. Device/Lead alerts: device on advisory Comments: normal pacemaker function. Plan: next remote transmission April 11, 2023.  SID Avila, Device Specialist I have reviewed and  interpreted the device interrogation, settings, programming, and encounter summary. The device is functioning within normal device parameters. I agree with the current findings, assessment and plan.    XR Video Swallow with SLP or OT    Result Date: 1/28/2023  EXAM: XR VIDEO SWALLOW WITH SLP OR OT LOCATION: [facility] DATE/TIME: 1/28/2023 10:03 AM INDICATION: Difficulty swallowing. COMPARISON: None. TECHNIQUE: Routine swallow study with speech pathology using multiple barium thicknesses. FINDINGS: FLUOROSCOPIC TIME: 1.6 NUMBER OF IMAGES: 6 Swallow study with Speech Pathology using multiple barium thicknesses. No significant penetration or aspiration. Early pour over noted. After swallowing and clearing of contents from the cervical esophagus, reflux of barium was subsequently noted back into the proximal esophagus. The fluoroscopic camera was moved inferiorly to look at the remaining esophagus, which demonstrated retained barium and irregular contractions suggesting dysmotility, though this is not an esophagram and inadequately evaluates for any other process or narrowing. Recommend GI consultation with direct visualization versus esophagram.     XR Esophagram    Result Date: 1/28/2023  EXAM: XR ESOPHAGRAM SINGLE CONTRAST LOCATION: [facility] DATE/TIME: 1/28/2023 1:16 PM INDICATION: Esophageal dysmotility. Difficulty swallowing. COMPARISON: Same day video swallow. TECHNIQUE: Routine. FINDINGS: FLUOROSCOPIC TIME: .7 NUMBER OF IMAGES: 5 ESOPHAGUS: Modified semiupright esophagram. Oral barium contrast administered. The esophagus is patulous and mildly dilated in appearance. Irregular esophageal contractions identified. No stricture or narrowing identified. Contrast extends out of the stomach and into the proximal small bowel. Could not evaluate for gastroesophageal reflux secondary to patient immobility. Multilead pacer apparatus noted.     IMPRESSION: 1.  Moderate  esophageal dysmotility. 2.  No stricture or narrowing.      TTS: I have personally spent a total of 35 minutes today on unit in review of medical record and assessment of patient today, with more than 50% of this time with patient and daughter, Justa, discussing with patient symptom management, risks and benefits of management options and development of plan of care as noted above.    WAN Griffin, SALVADOR, CNS  Palliative Care  [phone]   none

## 2023-05-19 NOTE — H&P ADULT - PROBLEM SELECTOR PLAN 3
- noted to have Scr of 1.79 (normal baseline)  - transfuse PRBC can be pre-renal due to hypoperfusion  - f/u UA   - f/u urine lytes   - hold nephrotoxic agents

## 2023-05-19 NOTE — H&P ADULT - NSHPREVIEWOFSYSTEMS_GEN_ALL_CORE
CONSTITUTIONAL: + decreased appetite, generalized weakness.  No fever, weight loss, or fatigue  EYES: No eye pain, visual disturbances, or discharge  ENT:  No difficulty hearing, tinnitus, vertigo; No sinus or throat pain  NECK: No pain or stiffness  RESPIRATORY: No cough, wheezing, chills or hemoptysis; Has Shortness of Breath on exertion   CARDIOVASCULAR: No chest pain, palpitations, passing out or leg swelling. Has dizziness   GASTROINTESTINAL: No abdominal or epigastric pain. No nausea, vomiting, or hematemesis; No diarrhea or constipation. No melena or hematochezia.  GENITOURINARY: No dysuria, frequency, hematuria, or incontinence  NEUROLOGICAL: No headaches, memory loss, loss of strength, numbness, or tremors  SKIN: No skin lesions   LYMPH Nodes: No enlarged glands  ENDOCRINE: No heat or cold intolerance; No hair loss  MUSCULOSKELETAL: No joint pain or swelling; No muscle, back, No extremity pain  PSYCHIATRIC: No depression, anxiety, mood swings, or difficulty sleeping  HEME/LYMPH: No easy bruising, or bleeding gums  ALLERGY AND IMMUNOLOGIC: No hives or eczema

## 2023-05-19 NOTE — ED ADULT NURSE REASSESSMENT NOTE - NS ED NURSE REASSESS COMMENT FT1
1137 AM  -  (covering  RN )relayed  to  DR. Cabral  for  pain  management   states  that   his  lower  back  is   hurting  him.  9/10  will  give  pain  medication  primary  RN  aware.

## 2023-05-19 NOTE — H&P ADULT - NSHPPHYSICALEXAM_GEN_ALL_CORE
PHYSICAL EXAM:  GENERAL: NAD, speaks in full sentences, no signs of respiratory distress, saturating well on 2L NC   HEAD:  Atraumatic, Normocephalic  EYES: EOMI, PERRLA, bilateral scleral icterus noted   NECK: Supple  CHEST/LUNG: Clear to auscultation bilaterally; No wheeze; No crackles; No accessory muscles used  HEART: Regular rate and rhythm; No murmurs; gallops or rubs   ABDOMEN: Soft, Nontender, Nondistended; Bowel sounds present; No guarding  EXTREMITIES:  2+ Peripheral Pulses, No cyanosis or edema  PSYCH: AAOx3  NEUROLOGY: non-focal  SKIN: No rashes or lesions

## 2023-05-19 NOTE — CONSULT NOTE ADULT - SUBJECTIVE AND OBJECTIVE BOX
Patient is a 43y old  Male who presents with a chief complaint of increasing SOB.  No cp, back pain, fever or chills. His baseline Hb is 5-6.  He refused to take HU, oxbryta, and jedenu.    HPI:       ROS:  Negative except for:    PAST MEDICAL & SURGICAL HISTORY:  Sickle cell anemia      Gout      History of cholecystectomy          SOCIAL HISTORY:    FAMILY HISTORY:  Family history of sickle cell trait (Father, Mother)        MEDICATIONS  (STANDING):  allopurinol 100 milliGRAM(s) Oral daily  folic acid 1 milliGRAM(s) Oral every 24 hours  oxyCODONE  ER Tablet 30 milliGRAM(s) Oral every 8 hours    MEDICATIONS  (PRN):      Allergies    piperacillin-tazobactam (Urticaria)  penicillin (Pruritus)  hydroxyurea (Other)  ceftriaxone (Anaphylaxis)    Intolerances        Vital Signs Last 24 Hrs  T(C): 36.4 (19 May 2023 08:55), Max: 36.8 (19 May 2023 02:24)  T(F): 97.5 (19 May 2023 08:55), Max: 98.2 (19 May 2023 02:24)  HR: 64 (19 May 2023 08:55) (64 - 89)  BP: 126/77 (19 May 2023 08:55) (116/69 - 137/85)  BP(mean): --  RR: 18 (19 May 2023 08:55) (14 - 18)  SpO2: 95% (19 May 2023 08:55) (92% - 100%)    Parameters below as of 19 May 2023 08:55  Patient On (Oxygen Delivery Method): nasal cannula  O2 Flow (L/min): 2      PHYSICAL EXAM  General: adult in NAD  HEENT: clear oropharynx, anicteric sclera, pink conjunctiva  Neck: supple  CV: normal S1/S2 with no murmur rubs or gallops  Lungs: positive air movement b/l ant lungs,clear to auscultation, no wheezes, no rales  Abdomen: soft non-tender non-distended, no hepatosplenomegaly  Ext: no clubbing cyanosis or edema  Skin: no rashes and no petechiae  Neuro: alert and oriented X 4, no focal deficits      LABS:                          5.1    11.51 )-----------( 285      ( 19 May 2023 02:40 )             14.0         Mean Cell Volume : 93.3 fl  Mean Cell Hemoglobin : 34.0 pg  Mean Cell Hemoglobin Concentration : 36.4 gm/dL  Auto Neutrophil # : x  Auto Lymphocyte # : 5.18 K/uL  Auto Monocyte # : 0.92 K/uL  Auto Eosinophil # : 0.23 K/uL  Auto Basophil # : 0.00 K/uL  Auto Neutrophil % : 43.0 %  Auto Lymphocyte % : 45.0 %  Auto Monocyte % : 8.0 %  Auto Eosinophil % : 2.0 %  Auto Basophil % : 0.0 %      Serial CBC's  05-19 @ 02:40  Hct-14.0 / Hgb-5.1 / Plat-285 / RBC-1.50 / WBC-11.51      05-19    135  |  107  |  26<H>  ----------------------------<  133<H>  3.9   |  21<L>  |  1.79<H>    Ca    8.6      19 May 2023 02:40    TPro  7.3  /  Alb  3.5  /  TBili  8.0<H>  /  DBili  3.7<H>  /  AST  165<H>  /  ALT  77<H>  /  AlkPhos  118  05-19      PT/INR - ( 19 May 2023 02:40 )   PT: 14.1 sec;   INR: 1.18 ratio         PTT - ( 19 May 2023 02:40 )  PTT:68.6 sec    Reticulocyte Percent: 14.5 % (05-19 @ 02:40)              BLOOD SMEAR INTERPRETATION:       RADIOLOGY & ADDITIONAL STUDIES:

## 2023-05-19 NOTE — CONSULT NOTE ADULT - ASSESSMENT
Confidential Drug Utilization Report  Search Terms: Alex Downey, 1980Search Date: 05/19/2023 09:25:36 AM  The Drug Utilization Report below displays all of the controlled substance prescriptions, if any, that your patient has filled in the last twelve months. The information displayed on this report is compiled from pharmacy submissions to the Department, and accurately reflects the information as submitted by the pharmacies.    This report was requested by: Beata Alfaro | Reference #: [id number]    You have not added a NANCY number. Keeping your NANCY number(s) up to date on the My NANCY # tab will enable the separation of your prescriptions from others in the search results.    Practitioner Count: 1  Pharmacy Count: 1  Current Opioid Prescriptions: 1  Current Benzodiazepine Prescriptions: 0  Current Stimulant Prescriptions: 0      Patient Demographic Information (PDI)       PDI	First Name	Last Name	Birth Date	Gender	Street Address	[facility]	Zip Code  DAVID Downey	1980	Male	[phone]	[facility] [facility]	[address]    Prescription Information      PDI Filter:    PDI	Current Rx	Drug Type	Rx Written	Rx Dispensed	Drug	Quantity	Days Supply	Prescriber Name	Prescriber NANCY #	Payment Method  A	Y	O	04/20/2023	04/25/2023	oxycodone hcl (ir) 30 mg tab	180	30	Jordan, Shirley SOLITARIO	[id number]	Medicaid  Dispenser [place]   A	N	O	03/23/2023	03/27/2023	oxycodone hcl (ir) 30 mg tab	180	30	Jordan, Shirley SOLITARIO	[id number]	Insurance  Dispenser [place]   A	N	O	02/23/2023	02/27/2023	oxycodone hcl (ir) 30 mg tab	180	30	Jordan, Shirley SOLITARIO	[id number]	Insurance  Dispenser [place]   A	N	O	12/29/2022	01/02/2023	oxycodone hcl (ir) 30 mg tab	180	30	Jag Ayala	[id number]	Insurance  Dispenser [place]   A	N	O	12/01/2022	12/03/2022	oxycodone hcl (ir) 30 mg tab	180	30	Jordan, Shirley SOLITARIO	[id number]	Insurance  Dispenser [place]   A	N	O	11/03/2022	11/04/2022	oxycodone hcl (ir) 30 mg tab	180	30	Jordan, Shirley SOLITARIO	[id number]	Insurance  Dispenser [place]   A	N	O	10/06/2022	10/10/2022	oxycodone hcl (ir) 30 mg tab	180	30	Jordan, Shirley SOLITARIO	[id number]	Insurance  Dispenser [place]   A	N	O	09/08/2022	09/09/2022	oxycodone hcl (ir) 30 mg tab	180	30	Jordan, Shirley SOLITARIO	[id number]	Insurance  Dispenser [place]   A	N	O	08/11/2022	08/12/2022	oxycodone hcl (ir) 30 mg tab	180	30	Jordan, Shirley SOLITARIO	[id number]	Insurance  Dispenser [place]   A	N	O	07/14/2022	07/15/2022	oxycodone hcl (ir) 30 mg tab	180	30	Jordan, Shirley SOLITARIO	[id number]	Insurance  Dispenser [place]   A	N	O	06/16/2022	06/16/2022	oxycodone hcl (ir) 30 mg tab	180	30	Jordan, Shirley SOLITARIO	[id number]	Insurance  Dispenser [place]   A	N	O	05/19/2022	05/20/2022	oxycodone hcl (ir) 30 mg tab	180	30	Jordan, Shirley SOLITARIO	[id number]	Insurance  Dispenser [place]     * - Details of Drug Type : O = Opioid, B = Benzodiazepine, S = Stimulant    * - Drugs marked with an asterisk are compound drugs. If the compound drug is made up of more than one controlled substance, then each controlled substance will be a separate row in the table.

## 2023-05-19 NOTE — ED ADULT NURSE REASSESSMENT NOTE - NS ED NURSE REASSESS COMMENT FT1
started care at 7;20am /pt is aaox3/no acute distress noted /safety maintained /blood transfusion inprogress/pt is admitted /awaiting for bed /will continue to monitor

## 2023-05-19 NOTE — H&P ADULT - PROBLEM SELECTOR PLAN 4
- noted to have WBC of 11.5   - afebrile and hemodynamically stable  - cxr from 1/6/23 and today's cxr showing no new infiltrates   - hold abx for now   - f/u UA   - if patient spikes temp, can send septic workup

## 2023-05-19 NOTE — CONSULT NOTE ADULT - ASSESSMENT
43 year old male pt with SCD.  his baseline Hb has been 5-6.  He has increasing sob at movement in the last few days.  No fever, cp, or crisis.    1. SCD with worsening anemia  will give 2 u PRBC    2. continue his usual pain meds 43 year old male pt with SCD.  his baseline Hb has been 5-6.  He has increasing sob at movement in the last few days.  No fever, cp, or crisis.    1. SCD with worsening anemia  will give 2 u PRBC    2. continue his usual pain meds    3. increased direct Noam and liver enzymes, chronic  do a US abomen    4 renal insuff, new, ?due to anemia  renal consult?

## 2023-05-19 NOTE — CONSULT NOTE ADULT - PROBLEM SELECTOR RECOMMENDATION 9
pain control  IVF  Folic Acid  oxygen supp  hem/onc eval  monitor retic count
Pt with acute exacerbation of chronic back pain and generalized joint pain which is somatic in nature due to sickle cell crisis. Retic % 14.5 (1/6). H/H 5/14.1. + MATA Pt is opioid dependent. On oxycodone 30mg PO 6x/day at home.    Opioid pain recommendations   - Start Dilaudid 2mg IV q3 hours PRN severe pain. Monitor for sedation/ respiratory depression.   - Titrate to home regimen as sickle cell crisis resolves. Pt on Oxycodone 30 mg po q4h PRN for severe pain.  Non-opioid pain recommendations   - Avoid NSAIDs.   - Avoid Tylenol +Transaminitis.  - Lidoderm 4% patch daily.   Bowel Regimen  - Continue Miralax 17G PO daily  - Continue Senna 2 tablets at bedtime for constipation  Mild pain   - Non-pharmacological pain treatment recommendations  - Warm/ Cool packs PRN   - Repositioning, imagery, relaxation, distraction.  - OOB if no contraindications   Recommendations discussed with primary team and RN.

## 2023-05-19 NOTE — ED PROVIDER NOTE - CLINICAL SUMMARY MEDICAL DECISION MAKING FREE TEXT BOX
43M presenting with sickle cell pain crisis, dyspnea on exertion. concerned for pain crisis vs anemia requiring transfusion. 43M presenting with sickle cell pain crisis, dyspnea on exertion. concerned for pain crisis vs anemia requiring transfusion.    patient consented for blood. spoke with Dr. Jordan. will admit.

## 2023-05-19 NOTE — H&P ADULT - ASSESSMENT
Patient is a 43M from home, with PMHx sickle cell disease w/ multiple hospitalizations for sickle cell complications (anemia and pain crisis) and gout p/w generalized body pain worsening x1d. Upon evaluation in ED patient afebrile and hemodynamically stable. Patient noted to have bilateral scleral icterus on examination. Labs significant for leukocytosis of 11.5, hgb of 5.1, LDH of 1081 Scr of 1.79 and total bilirubin of 8.1 with slight schistocytes in PBS. Patient admitted to medicine for symptomatic anemia and Sickle cell pain crisis management.

## 2023-05-20 ENCOUNTER — TRANSCRIPTION ENCOUNTER (OUTPATIENT)
Age: 43
End: 2023-05-20

## 2023-05-20 VITALS
TEMPERATURE: 98 F | RESPIRATION RATE: 17 BRPM | DIASTOLIC BLOOD PRESSURE: 72 MMHG | OXYGEN SATURATION: 93 % | SYSTOLIC BLOOD PRESSURE: 139 MMHG | HEART RATE: 73 BPM

## 2023-05-20 LAB
ALBUMIN SERPL ELPH-MCNC: 3.2 G/DL — LOW (ref 3.5–5)
ALP SERPL-CCNC: 112 U/L — SIGNIFICANT CHANGE UP (ref 40–120)
ALT FLD-CCNC: 76 U/L DA — HIGH (ref 10–60)
ANION GAP SERPL CALC-SCNC: 2 MMOL/L — LOW (ref 5–17)
AST SERPL-CCNC: 158 U/L — HIGH (ref 10–40)
BACTERIA # UR AUTO: ABNORMAL /HPF
BILIRUB SERPL-MCNC: 8.7 MG/DL — HIGH (ref 0.2–1.2)
BUN SERPL-MCNC: 20 MG/DL — HIGH (ref 7–18)
CALCIUM SERPL-MCNC: 8.8 MG/DL — SIGNIFICANT CHANGE UP (ref 8.4–10.5)
CHLORIDE SERPL-SCNC: 116 MMOL/L — HIGH (ref 96–108)
CO2 SERPL-SCNC: 22 MMOL/L — SIGNIFICANT CHANGE UP (ref 22–31)
CREAT ?TM UR-MCNC: 40 MG/DL — SIGNIFICANT CHANGE UP
CREAT SERPL-MCNC: 0.88 MG/DL — SIGNIFICANT CHANGE UP (ref 0.5–1.3)
EGFR: 109 ML/MIN/1.73M2 — SIGNIFICANT CHANGE UP
EPI CELLS # UR: ABNORMAL /HPF
GLUCOSE BLDC GLUCOMTR-MCNC: 96 MG/DL — SIGNIFICANT CHANGE UP (ref 70–99)
GLUCOSE SERPL-MCNC: 88 MG/DL — SIGNIFICANT CHANGE UP (ref 70–99)
HCT VFR BLD CALC: 23 % — LOW (ref 39–50)
HCT VFR BLD CALC: 24.8 % — LOW (ref 39–50)
HGB BLD-MCNC: 8.3 G/DL — LOW (ref 13–17)
HGB BLD-MCNC: 8.9 G/DL — LOW (ref 13–17)
MAGNESIUM SERPL-MCNC: 1.9 MG/DL — SIGNIFICANT CHANGE UP (ref 1.6–2.6)
MCHC RBC-ENTMCNC: 31.9 PG — SIGNIFICANT CHANGE UP (ref 27–34)
MCHC RBC-ENTMCNC: 32 PG — SIGNIFICANT CHANGE UP (ref 27–34)
MCHC RBC-ENTMCNC: 35.9 GM/DL — SIGNIFICANT CHANGE UP (ref 32–36)
MCHC RBC-ENTMCNC: 36.1 GM/DL — HIGH (ref 32–36)
MCV RBC AUTO: 88.8 FL — SIGNIFICANT CHANGE UP (ref 80–100)
MCV RBC AUTO: 88.9 FL — SIGNIFICANT CHANGE UP (ref 80–100)
NRBC # BLD: 0 /100 WBCS — SIGNIFICANT CHANGE UP (ref 0–0)
NRBC # BLD: 1 /100 WBCS — HIGH (ref 0–0)
PHOSPHATE SERPL-MCNC: 3.4 MG/DL — SIGNIFICANT CHANGE UP (ref 2.5–4.5)
PLATELET # BLD AUTO: 255 K/UL — SIGNIFICANT CHANGE UP (ref 150–400)
PLATELET # BLD AUTO: 260 K/UL — SIGNIFICANT CHANGE UP (ref 150–400)
POTASSIUM SERPL-MCNC: 5.2 MMOL/L — SIGNIFICANT CHANGE UP (ref 3.5–5.3)
POTASSIUM SERPL-SCNC: 5.2 MMOL/L — SIGNIFICANT CHANGE UP (ref 3.5–5.3)
PROT SERPL-MCNC: 6.8 G/DL — SIGNIFICANT CHANGE UP (ref 6–8.3)
RBC # BLD: 2.59 M/UL — LOW (ref 4.2–5.8)
RBC # BLD: 2.79 M/UL — LOW (ref 4.2–5.8)
RBC # FLD: 21 % — HIGH (ref 10.3–14.5)
RBC # FLD: 21.1 % — HIGH (ref 10.3–14.5)
RBC CASTS # UR COMP ASSIST: SIGNIFICANT CHANGE UP /HPF (ref 0–2)
SODIUM SERPL-SCNC: 140 MMOL/L — SIGNIFICANT CHANGE UP (ref 135–145)
SODIUM UR-SCNC: 96 MMOL/L — SIGNIFICANT CHANGE UP
WBC # BLD: 10.41 K/UL — SIGNIFICANT CHANGE UP (ref 3.8–10.5)
WBC # BLD: 9.87 K/UL — SIGNIFICANT CHANGE UP (ref 3.8–10.5)
WBC # FLD AUTO: 10.41 K/UL — SIGNIFICANT CHANGE UP (ref 3.8–10.5)
WBC # FLD AUTO: 9.87 K/UL — SIGNIFICANT CHANGE UP (ref 3.8–10.5)
WBC UR QL: SIGNIFICANT CHANGE UP /HPF (ref 0–5)

## 2023-05-20 PROCEDURE — 96375 TX/PRO/DX INJ NEW DRUG ADDON: CPT

## 2023-05-20 PROCEDURE — 82962 GLUCOSE BLOOD TEST: CPT

## 2023-05-20 PROCEDURE — 99285 EMERGENCY DEPT VISIT HI MDM: CPT

## 2023-05-20 PROCEDURE — 85027 COMPLETE CBC AUTOMATED: CPT

## 2023-05-20 PROCEDURE — 86901 BLOOD TYPING SEROLOGIC RH(D): CPT

## 2023-05-20 PROCEDURE — 82247 BILIRUBIN TOTAL: CPT

## 2023-05-20 PROCEDURE — 36430 TRANSFUSION BLD/BLD COMPNT: CPT

## 2023-05-20 PROCEDURE — 85610 PROTHROMBIN TIME: CPT

## 2023-05-20 PROCEDURE — 85025 COMPLETE CBC W/AUTO DIFF WBC: CPT

## 2023-05-20 PROCEDURE — 86900 BLOOD TYPING SEROLOGIC ABO: CPT

## 2023-05-20 PROCEDURE — 96374 THER/PROPH/DIAG INJ IV PUSH: CPT

## 2023-05-20 PROCEDURE — 85730 THROMBOPLASTIN TIME PARTIAL: CPT

## 2023-05-20 PROCEDURE — 86850 RBC ANTIBODY SCREEN: CPT

## 2023-05-20 PROCEDURE — 76700 US EXAM ABDOM COMPLETE: CPT

## 2023-05-20 PROCEDURE — 36415 COLL VENOUS BLD VENIPUNCTURE: CPT

## 2023-05-20 PROCEDURE — 85045 AUTOMATED RETICULOCYTE COUNT: CPT

## 2023-05-20 PROCEDURE — 71046 X-RAY EXAM CHEST 2 VIEWS: CPT

## 2023-05-20 PROCEDURE — 81001 URINALYSIS AUTO W/SCOPE: CPT

## 2023-05-20 PROCEDURE — 82248 BILIRUBIN DIRECT: CPT

## 2023-05-20 PROCEDURE — 83615 LACTATE (LD) (LDH) ENZYME: CPT

## 2023-05-20 PROCEDURE — 80053 COMPREHEN METABOLIC PANEL: CPT

## 2023-05-20 PROCEDURE — 84100 ASSAY OF PHOSPHORUS: CPT

## 2023-05-20 PROCEDURE — 82570 ASSAY OF URINE CREATININE: CPT

## 2023-05-20 PROCEDURE — 84300 ASSAY OF URINE SODIUM: CPT

## 2023-05-20 PROCEDURE — P9040: CPT

## 2023-05-20 PROCEDURE — 83735 ASSAY OF MAGNESIUM: CPT

## 2023-05-20 PROCEDURE — 86922 COMPATIBILITY TEST ANTIGLOB: CPT

## 2023-05-20 RX ORDER — ALLOPURINOL 300 MG
1 TABLET ORAL
Qty: 0 | Refills: 0 | DISCHARGE
Start: 2023-05-20

## 2023-05-20 RX ORDER — FOLIC ACID 0.8 MG
1 TABLET ORAL
Qty: 0 | Refills: 0 | DISCHARGE
Start: 2023-05-20

## 2023-05-20 RX ORDER — SODIUM CHLORIDE 9 MG/ML
1000 INJECTION INTRAMUSCULAR; INTRAVENOUS; SUBCUTANEOUS
Refills: 0 | Status: DISCONTINUED | OUTPATIENT
Start: 2023-05-20 | End: 2023-05-20

## 2023-05-20 RX ADMIN — HYDROMORPHONE HYDROCHLORIDE 2 MILLIGRAM(S): 2 INJECTION INTRAMUSCULAR; INTRAVENOUS; SUBCUTANEOUS at 13:49

## 2023-05-20 RX ADMIN — HYDROMORPHONE HYDROCHLORIDE 2 MILLIGRAM(S): 2 INJECTION INTRAMUSCULAR; INTRAVENOUS; SUBCUTANEOUS at 01:30

## 2023-05-20 RX ADMIN — HYDROMORPHONE HYDROCHLORIDE 2 MILLIGRAM(S): 2 INJECTION INTRAMUSCULAR; INTRAVENOUS; SUBCUTANEOUS at 08:00

## 2023-05-20 RX ADMIN — HYDROMORPHONE HYDROCHLORIDE 2 MILLIGRAM(S): 2 INJECTION INTRAMUSCULAR; INTRAVENOUS; SUBCUTANEOUS at 04:39

## 2023-05-20 RX ADMIN — LIDOCAINE 2 PATCH: 4 CREAM TOPICAL at 13:20

## 2023-05-20 RX ADMIN — HYDROMORPHONE HYDROCHLORIDE 2 MILLIGRAM(S): 2 INJECTION INTRAMUSCULAR; INTRAVENOUS; SUBCUTANEOUS at 05:00

## 2023-05-20 RX ADMIN — Medication 100 MILLIGRAM(S): at 13:19

## 2023-05-20 RX ADMIN — HYDROMORPHONE HYDROCHLORIDE 2 MILLIGRAM(S): 2 INJECTION INTRAMUSCULAR; INTRAVENOUS; SUBCUTANEOUS at 14:20

## 2023-05-20 RX ADMIN — HYDROMORPHONE HYDROCHLORIDE 2 MILLIGRAM(S): 2 INJECTION INTRAMUSCULAR; INTRAVENOUS; SUBCUTANEOUS at 11:20

## 2023-05-20 RX ADMIN — HYDROMORPHONE HYDROCHLORIDE 2 MILLIGRAM(S): 2 INJECTION INTRAMUSCULAR; INTRAVENOUS; SUBCUTANEOUS at 00:57

## 2023-05-20 RX ADMIN — SODIUM CHLORIDE 75 MILLILITER(S): 9 INJECTION INTRAMUSCULAR; INTRAVENOUS; SUBCUTANEOUS at 00:57

## 2023-05-20 RX ADMIN — HYDROMORPHONE HYDROCHLORIDE 2 MILLIGRAM(S): 2 INJECTION INTRAMUSCULAR; INTRAVENOUS; SUBCUTANEOUS at 10:49

## 2023-05-20 RX ADMIN — Medication 1 MILLIGRAM(S): at 13:20

## 2023-05-20 RX ADMIN — LIDOCAINE 2 PATCH: 4 CREAM TOPICAL at 00:50

## 2023-05-20 RX ADMIN — Medication 100 UNIT(S): at 14:37

## 2023-05-20 RX ADMIN — HYDROMORPHONE HYDROCHLORIDE 2 MILLIGRAM(S): 2 INJECTION INTRAMUSCULAR; INTRAVENOUS; SUBCUTANEOUS at 07:43

## 2023-05-20 NOTE — DISCHARGE NOTE PROVIDER - HOSPITAL COURSE
43M from home, with PMHx sickle cell disease w/ multiple hospitalizations for sickle cell complications (anemia and pain crisis) and gout p/w generalized body pain worsening x1d. Upon evaluation in ED patient afebrile and hemodynamically stable. Patient noted to have bilateral scleral icterus on examination. Labs significant for leukocytosis of 11.5, hgb of 5.1, LDH of 1081 Scr of 1.79 and total bilirubin of 8.1 with slight schistocytes in PBS. Patient admitted to medicine for symptomatic anemia and Sickle cell pain crisis management.        Problem/Plan - 1:  ·  Problem: Symptomatic anemia.   ·  Plan: - patient presenting with hbg of 5.1 (baseline around 7)  - hx of multiple blood transfusions in the past  - not actively bleeding, can be 2/2 sickle cell disease  - admit to medicine  - keep active type and screen   - will transfuse 2 unit PRBCs  - SCD for DVT prophylaxis   - transfuse hgb>7   - resume diet   - f/u post transfusion cbc   - Dr. Jordan Heme/ Onc consulted.     Problem/Plan - 2:  ·  Problem: Sickle cell crisis.   ·  Plan: - patient with multiple complaints of pain throughout body  - noted to have scleral icterus on physical examination  - noted to have bili of 8, direct bili of 3.7, LDH of 1081, with slight schistocytes on PBS, concerning for hemolysis   - admit to medicine  - transfuse 2 unit PRBCs  - IVF hydration   - pain control with oxycodone   - Pain management consult.     Problem/Plan - 3:  ·  Problem: MATA (acute kidney injury).   ·  Plan: - noted to have Scr of 1.79 (normal baseline)  - transfuse PRBC can be pre-renal due to hypoperfusion  - f/u UA   - f/u urine lytes   - hold nephrotoxic agents.     Problem/Plan - 4:  ·  Problem: Leukocytosis.   ·  Plan: - noted to have WBC of 11.5   - afebrile and hemodynamically stable  - cxr from 1/6/23 and today's cxr showing no new infiltrates   - hold abx for now   - f/u UA   - if patient spikes temp, can send septic workup.    Please note that this a brief summary of hospital course please refer to daily progress notes and consult notes for full course and events

## 2023-05-20 NOTE — DISCHARGE NOTE PROVIDER - NSDCCPCAREPLAN_GEN_ALL_CORE_FT
PRINCIPAL DISCHARGE DIAGNOSIS  Diagnosis: Anemia, sickle cell with crisis  Assessment and Plan of Treatment: YOu presented to the hospital with complaints of body pain. you were treated for a sickle cell crisis with IV fluids, packed red blood cells. your condition has improved, your hemoglobin is stable and you are ready for discharge. A sickle cell crisis is a painful episode that occurs in people who have sickle cell anemia. It happens when sickle-shaped red blood cells (RBCs) block blood vessels. Blood and oxygen cannot get to tissues, causing pain. A sickle cell crisis can also damage your tissues and cause organ failure, such liver or kidney failure. A sickle cell crisis can become life-threatening.  DISCHARGE INSTRUCTIONS:  Call your local emergency number (911 in the [place]) if:  You have shortness of breath or chest pain.  You are a man and have an erection that is painful and does not go away.  You lose vision in one or both eyes.  Prevent a sickle cell crisis:  Take vitamins and minerals as directed. Folic acid may help prevent blood vessel problems that can occur with sickle cell anemia. Zinc may decrease how often you have pain.  Drink liquids as directed. Dehydration can increase your risk for a sickle cell crisis.   Rest during a sickle cell crisis. Wash your hands frequently. Handwashing can help prevent illness.   Follow up with your doctor      SECONDARY DISCHARGE DIAGNOSES  Diagnosis: Symptomatic anemia  Assessment and Plan of Treatment: YOu were treated for anemia in the hospital. your conditon has improved after 2 blood transfusions. Symptoms to report, bleeding, palpitations, fatigue, pale skin, cold skin, dizziness. Take medications as ordered by PCP. Follow up with your hematologist on discharge.      Diagnosis: MATA (acute kidney injury)  Assessment and Plan of Treatment: YOu were found to have an acute kidney injury. you were treated with IV fluids. Acute kidney injury (MATA) is also called acute kidney failure, or acute renal failure. MATA happens when your kidneys suddenly stop working correctly.Drink liquids as directed: Your healthcare provider may recommend that you drink a certain amount of liquids. your kidney injury has resolved. follow up with your PCP on discharge.

## 2023-05-20 NOTE — DISCHARGE NOTE PROVIDER - ATTENDING ATTESTATION STATEMENT
Walk in I have personally seen and examined the patient. I have collaborated with and supervised the

## 2023-05-20 NOTE — PROGRESS NOTE ADULT - SUBJECTIVE AND OBJECTIVE BOX
CHIEF COMPLAINT  Anemia    HISTORY OF PRESENT ILLNESS  TOLU VARGAS is a 43y Male who presents with a chief complaint of anemia    No acute events. No complaints.    REVIEW OF SYSTEMS  A complete review of systems was performed; negative except per HPI    PHYSICAL EXAM  T(C): 36.9 (05-20-23 @ 04:58), Max: 36.9 (05-20-23 @ 04:58)  HR: 83 (05-20-23 @ 04:58) (60 - 83)  BP: 125/71 (05-20-23 @ 04:58) (125/71 - 152/83)  RR: 17 (05-20-23 @ 04:58) (17 - 18)  SpO2: 94% (05-20-23 @ 04:58) (94% - 100%)  Constitutional: alert, awake, in no acute distress  Eyes: PERRL, EOMI  HEENT: normocephalic, atraumatic  Neck: supple, non-tender  Cardiovascular: normal perfusion, no peripheral edema  Respiratory: normal respiratory efforts; no increased use of accessory muscles  Gastrointestinal: soft, non-tender  Musculoskeletal: normal range of motion, no deformities noted  Neurological: alert, CN II to XI grossly intact  Skin: warm, dry    LABORATORY DATA                        8.9    9.87  )-----------( 260      ( 20 May 2023 05:55 )             24.8     05-20    140  |  116<H>  |  20<H>  ----------------------------<  88  5.2   |  22  |  0.88    Ca    8.8      20 May 2023 05:55  Phos  3.4     05-20  Mg     1.9     05-20    TPro  6.8  /  Alb  3.2<L>  /  TBili  8.7<H>  /  DBili  x   /  AST  158<H>  /  ALT  76<H>  /  AlkPhos  112  05-20

## 2023-05-20 NOTE — PROGRESS NOTE ADULT - SUBJECTIVE AND OBJECTIVE BOX
Patient is a 43y old  Male who presents with a chief complaint of Symptomatic Anemia/ Sickle Cell Pain crisis (20 May 2023 08:51)  Awake, alert, comfortable in bed in NAD. Clinically improved. S/p PRBCs    INTERVAL HPI/OVERNIGHT EVENTS:      VITAL SIGNS:  T(F): 98.5 (23 @ 04:58)  HR: 83 (23 @ 04:58)  BP: 125/71 (23 @ 04:58)  RR: 17 (23 @ 04:58)  SpO2: 94% (23 @ 04:58)  Wt(kg): --  I&O's Detail          REVIEW OF SYSTEMS:    CONSTITUTIONAL:  No fevers, chills, sweats    HEENT:  Eyes:  No diplopia or blurred vision. ENT:  No earache, sore throat or runny nose.    CARDIOVASCULAR:  No pressure, squeezing, tightness, or heaviness about the chest; no palpitations.    RESPIRATORY:  Per HPI    GASTROINTESTINAL:  No abdominal pain, nausea, vomiting or diarrhea.    GENITOURINARY:  No dysuria, frequency or urgency.    NEUROLOGIC:  No paresthesias, fasciculations, seizures or weakness.    PSYCHIATRIC:  No disorder of thought or mood.      PHYSICAL EXAM:    Constitutional: Well developed and nourished  Eyes:Perrla  ENMT: normal  Neck:supple  Respiratory: good air entry  Cardiovascular: S1 S2 regular  Gastrointestinal: Soft, Non tender  Extremities: No edema  Vascular:normal  Neurological:Awake, alert,Ox3  Musculoskeletal:Normal      MEDICATIONS  (STANDING):  allopurinol 100 milliGRAM(s) Oral daily  folic acid 1 milliGRAM(s) Oral every 24 hours  lidocaine   4% Patch 2 Patch Transdermal daily  polyethylene glycol 3350 17 Gram(s) Oral daily  senna 2 Tablet(s) Oral at bedtime  sodium chloride 0.9%. 1000 milliLiter(s) (75 mL/Hr) IV Continuous <Continuous>    MEDICATIONS  (PRN):  HYDROmorphone  Injectable 2 milliGRAM(s) IV Push every 3 hours PRN Severe Pain (7 - 10)      Allergies    piperacillin-tazobactam (Urticaria)  penicillin (Pruritus)  hydroxyurea (Other)  ceftriaxone (Anaphylaxis)    Intolerances        LABS:                        8.9    9.87  )-----------( 260      ( 20 May 2023 05:55 )             24.8     05-20    140  |  116<H>  |  20<H>  ----------------------------<  88  5.2   |  22  |  0.88    Ca    8.8      20 May 2023 05:55  Phos  3.4     05-20  Mg     1.9     -20    TPro  6.8  /  Alb  3.2<L>  /  TBili  8.7<H>  /  DBili  x   /  AST  158<H>  /  ALT  76<H>  /  AlkPhos  112  05-20    PT/INR - ( 19 May 2023 02:40 )   PT: 14.1 sec;   INR: 1.18 ratio         PTT - ( 19 May 2023 02:40 )  PTT:68.6 sec  Urinalysis Basic - ( 19 May 2023 23:40 )    Color: Yellow / Appearance: Clear / S.015 / pH: x  Gluc: x / Ketone: Negative  / Bili: Negative / Urobili: 1 mg/dL   Blood: x / Protein: 100 mg/dL / Nitrite: Negative   Leuk Esterase: Negative / RBC: 0-2 /HPF / WBC 0-2 /HPF   Sq Epi: x / Non Sq Epi: x / Bacteria: Moderate /HPF            CAPILLARY BLOOD GLUCOSE      POCT Blood Glucose.: 96 mg/dL (20 May 2023 07:45)  POCT Blood Glucose.: 142 mg/dL (19 May 2023 17:39)        RADIOLOGY & ADDITIONAL TESTS:    CXR:    Ct scan chest:    ekg;    echo:

## 2023-05-20 NOTE — PROGRESS NOTE ADULT - PROBLEM SELECTOR PLAN 3
likely secondary to severe anemia  oxygen supp  monitor oxygen sat  R/o underlying bronchospasm  Bronchodilators prn  PFTs as OP

## 2023-05-20 NOTE — DISCHARGE NOTE PROVIDER - NSDCMRMEDTOKEN_GEN_ALL_CORE_FT
allopurinol 100 mg oral tablet: 1 tab(s) orally once a day  folic acid 1 mg oral tablet: 1 tab(s) orally every 24 hours  oxyCODONE 30 mg oral tablet: 1 tab(s) orally every 8 hours, As Needed  polyethylene glycol 3350 oral powder for reconstitution: 17 gram(s) orally once a day, As Needed - for constipation

## 2023-05-20 NOTE — PROGRESS NOTE ADULT - ASSESSMENT
TOLU VARGAS is a 43y Male who presents with a chief complaint of anemia    Sickle Cell Anemia  ·	Patient follows with Dr. Jordan.  ·	Baseline hemoglobin is 5-6. No need for transfusion today given hemoglobin of 8.9.  ·	Optimize pain control; pain medicine following.  ·	Monitor CBC; maintain hemoglobin > 5.  ·	Patient had previously refused hydroxyurea, iron chelation.  ·	Elevated bilirubin likely due to sickle cell hemolysis.    Continue to monitor liver enzymes, which remain elevated.   Creatinine has improved. Continue to monitor renal functions.    Will continue to follow.    Bala Pan MD  Hematology/Oncology  O: [phone]

## 2023-05-20 NOTE — DISCHARGE NOTE PROVIDER - CARE PROVIDER_API CALL
Chuck Jordan  INTERNAL MEDICINE  [address] [place] [address]  Phone: [phone]  Fax: [phone]  Established Patient  Follow Up Time: 1-3 days

## 2023-05-20 NOTE — DISCHARGE NOTE NURSING/CASE MANAGEMENT/SOCIAL WORK - PATIENT PORTAL LINK FT
You can access the FollowMyHealth Patient Portal offered by [facility] by registering at the following website: [email or web address]. By joining [employer]’s FollowMyHealth portal, you will also be able to view your health information using other applications (apps) compatible with our system.

## 2023-05-20 NOTE — DISCHARGE NOTE NURSING/CASE MANAGEMENT/SOCIAL WORK - NSDCPEFALRISK_GEN_ALL_CORE
For information on Fall & Injury Prevention, visit: [email or web address] OR  [email or web address] OR  https://www.cdc.gov/steadi/patient.html

## 2023-05-20 NOTE — PROGRESS NOTE ADULT - SUBJECTIVE AND OBJECTIVE BOX
Patient is a 43y old  Male who presents with a chief complaint of Symptomatic Anemia/ Sickle Cell Pain crisis (19 May 2023 10:29)    pt seen in icu [  ], reg med floor [   ], bed [  ], chair at bedside [   ], a+o x3 [  ], lethargic [  ],  nad [  ]    daniel [  ], ngt [  ], peg [  ], et tube [  ], cent line [  ], picc line [  ]        Allergies    piperacillin-tazobactam (Urticaria)  penicillin (Pruritus)  hydroxyurea (Other)  ceftriaxone (Anaphylaxis)        Vitals    T(F): 98.5 (05-20-23 @ 04:58), Max: 98.5 (05-20-23 @ 04:58)  HR: 83 (05-20-23 @ 04:58) (60 - 83)  BP: 125/71 (05-20-23 @ 04:58) (116/69 - 152/83)  RR: 17 (05-20-23 @ 04:58) (16 - 18)  SpO2: 94% (05-20-23 @ 04:58) (92% - 100%)  Wt(kg): --  CAPILLARY BLOOD GLUCOSE      POCT Blood Glucose.: 142 mg/dL (19 May 2023 17:39)      Labs                          8.3    10.41 )-----------( 255      ( 20 May 2023 02:00 )             23.0       05-19    135  |  107  |  26<H>  ----------------------------<  133<H>  3.9   |  21<L>  |  1.79<H>    Ca    8.6      19 May 2023 02:40    TPro  7.3  /  Alb  3.5  /  TBili  8.0<H>  /  DBili  3.7<H>  /  AST  165<H>  /  ALT  77<H>  /  AlkPhos  118  05-19                Radiology Results      Meds    MEDICATIONS  (STANDING):  allopurinol 100 milliGRAM(s) Oral daily  folic acid 1 milliGRAM(s) Oral every 24 hours  lidocaine   4% Patch 2 Patch Transdermal daily  polyethylene glycol 3350 17 Gram(s) Oral daily  senna 2 Tablet(s) Oral at bedtime  sodium chloride 0.9%. 1000 milliLiter(s) (75 mL/Hr) IV Continuous <Continuous>      MEDICATIONS  (PRN):  HYDROmorphone  Injectable 2 milliGRAM(s) IV Push every 3 hours PRN Severe Pain (7 - 10)      Physical Exam    Neuro :  no focal deficits  Respiratory: CTA B/L  CV: RRR, S1S2, no murmurs,   Abdominal: Soft, NT, ND +BS,  Extremities: No edema, + peripheral pulses    ASSESSMENT    sickle brenda pain crisis,   anemia 2nd sickle cell disease,  aretha,   atelectasis,   reactive leukocytosis,   h/o sickle cell disease,   gout and cholecystectomy    Hemoglobin SS disease with crisis    Sickle cell anemia    Gout    Anemia requiring transfusions    Marijuana abuse    Pneumonia    History of cholecystectomy        PLAN    admit to med,    ivf,   transfuse 2 unit prbc,   cont pain control,   incentive spirometer,   cont preadmit home meds,   gi and dvt prophylaxis    pain mgmt cons    cbc,   bmp,   mg,   phos,   lipids,  tsh    abd us    pain mgmt cons  heme onc cons    pulm cons .         Patient is a 43y old  Male who presents with a chief complaint of Symptomatic Anemia/ Sickle Cell Pain crisis (19 May 2023 10:29)    pt seen in icu [  ], reg med floor [ x  ], bed [x  ], chair at bedside [   ], a+o x3 [x  ], lethargic [  ],  nad [x  ]        Allergies    piperacillin-tazobactam (Urticaria)  penicillin (Pruritus)  hydroxyurea (Other)  ceftriaxone (Anaphylaxis)        Vitals    T(F): 98.5 (05-20-23 @ 04:58), Max: 98.5 (05-20-23 @ 04:58)  HR: 83 (05-20-23 @ 04:58) (60 - 83)  BP: 125/71 (05-20-23 @ 04:58) (116/69 - 152/83)  RR: 17 (05-20-23 @ 04:58) (16 - 18)  SpO2: 94% (05-20-23 @ 04:58) (92% - 100%)  Wt(kg): --  CAPILLARY BLOOD GLUCOSE      POCT Blood Glucose.: 142 mg/dL (19 May 2023 17:39)      Labs                          8.3    10.41 )-----------( 255      ( 20 May 2023 02:00 )             23.0       05-19    135  |  107  |  26<H>  ----------------------------<  133<H>  3.9   |  21<L>  |  1.79<H>    Ca    8.6      19 May 2023 02:40    TPro  7.3  /  Alb  3.5  /  TBili  8.0<H>  /  DBili  3.7<H>  /  AST  165<H>  /  ALT  77<H>  /  AlkPhos  118  05-19                Radiology Results    < from: US Abdomen Complete (05.19.23 @ 10:36) >  IMPRESSION:  Status post cholecystectomy. No biliary dilatation. No acute findings    < end of copied text >      Meds    MEDICATIONS  (STANDING):  allopurinol 100 milliGRAM(s) Oral daily  folic acid 1 milliGRAM(s) Oral every 24 hours  lidocaine   4% Patch 2 Patch Transdermal daily  polyethylene glycol 3350 17 Gram(s) Oral daily  senna 2 Tablet(s) Oral at bedtime  sodium chloride 0.9%. 1000 milliLiter(s) (75 mL/Hr) IV Continuous <Continuous>      MEDICATIONS  (PRN):  HYDROmorphone  Injectable 2 milliGRAM(s) IV Push every 3 hours PRN Severe Pain (7 - 10)      Physical Exam    Neuro :  no focal deficits  Respiratory: CTA B/L  CV: RRR, S1S2, no murmurs,   Abdominal: Soft, NT, ND +BS,  Extremities: No edema, + peripheral pulses      ASSESSMENT    sickle cell pain crisis,   anemia 2nd sickle cell disease,  aretha,   atelectasis,   reactive leukocytosis,   h/o sickle cell disease,   gout   cholecystectomy      PLAN    d/c ivf,   transfuse 2 unit prbc,   hgb at baseline  serum creat improved  heme onc f/u   abd us with Status post cholecystectomy. No biliary dilatation. No acute findings  pulm f/u    pFTs as OP.  cont current meds  pt stable for d/c

## 2023-05-20 NOTE — CHART NOTE - NSCHARTNOTEFT_GEN_A_CORE
EVENT:   5/20/23, 12:40am, pt. with dx. Symptomatic anemia, sickle cell crisis, MATA had transfusion 1 unit of PRBC's. Prior transfusion, pt. was given as per his request, Benadryl 25 mg IVP x 1 dose. At 12:40 am, 1 unit PRBC' s transfusion - completed. VS - 97.5F, HR - 65, BP - 150/77, RR - 17, O2sat - 100%RA. No any distress or complication observed.    HPI:       42 y/o male from home, with PMH sickle cell disease w/ multiple hospitalizations for sickle cell complications (anemia and pain crisis) and gout p/w generalized body pain worsening x1d. Patient reports that he has lower back pain radiating towards his legs 8/10 in severity and constant over the last 24 hrs. Patient also complains that he has bilateral arm and leg pain. Patient states that he normally gets this pain when he has a sickle cell pain crisis. Patient also reports he follows with Dr. Jordan (outpatient oncologist) and his outpatient provider referred him to come to ED due to yellowing of his skin and anemia. Patient reports dizziness and shortness or breath on exertion. Denies chest pain, syncope. Denies cough, fevers or chills. Denies abdominal or urinary symptoms. Denies melena, hematochezia, hematuria or any signs of bleeding.     OBJECTIVE:  Vital Signs Last 24 Hrs  T(C): 36.4 (19 May 2023 22:09), Max: 36.8 (19 May 2023 02:24)  T(F): 97.5 (19 May 2023 22:09), Max: 98.2 (19 May 2023 02:24)  HR: 65 (19 May 2023 22:09) (60 - 89)  BP: 150/77 (19 May 2023 22:09) (116/69 - 152/83)  BP(mean): 102 (19 May 2023 19:30) (102 - 106)  RR: 17 (19 May 2023 22:09) (14 - 18)  SpO2: 100% (19 May 2023 22:09) (92% - 100%)    Parameters below as of 19 May 2023 22:09  Patient On (Oxygen Delivery Method): room air        FOCUSED PHYSICAL EXAM:    LABS:                        6.9    7.62  )-----------( 244      ( 19 May 2023 18:23 )             19.2     05-19    135  |  107  |  26<H>  ----------------------------<  133<H>  3.9   |  21<L>  |  1.79<H>    Ca    8.6      19 May 2023 02:40    TPro  7.3  /  Alb  3.5  /  TBili  8.0<H>  /  DBili  3.7<H>  /  AST  165<H>  /  ALT  77<H>  /  AlkPhos  118  05-19    PLAN:   - Post transfusion CBC - at 2am,   - IV fluid - NS @ 75 ml/hrs.     FOLLOW UP / RESULT:   - Monitor patient response to transfusion,   - Maintain safety and comfort measures,   - Follow-up morning labs. EVENT:   5/20/23, 12:40am, pt. with dx. Symptomatic anemia, sickle cell crisis, MATA had transfusion 1 unit of PRBC's. Prior transfusion, pt. was given as per his request, Benadryl 25 mg IVP x 1 dose. At 12:40 am, 1 unit PRBC' s transfusion - completed. VS - 97.5F, HR - 65, BP - 150/77, RR - 17, O2sat - 100%RA. No any distress or complication observed.    5/20/23, 5am, called by RN re: pt. c/o logan. LE severe pain, redness and swelling. Assessed at bedside.   HPI:       44 y/o male from home, with PMH sickle cell disease w/ multiple hospitalizations for sickle cell complications (anemia and pain crisis) and gout p/w generalized body pain worsening x1d. Patient reports that he has lower back pain radiating towards his legs 8/10 in severity and constant over the last 24 hrs. Patient also complains that he has bilateral arm and leg pain. Patient states that he normally gets this pain when he has a sickle cell pain crisis. Patient also reports he follows with Dr. Jordan (outpatient oncologist) and his outpatient provider referred him to come to ED due to yellowing of his skin and anemia. Patient reports dizziness and shortness or breath on exertion. Denies chest pain, syncope. Denies cough, fevers or chills. Denies abdominal or urinary symptoms. Denies melena, hematochezia, hematuria or any signs of bleeding.     OBJECTIVE:  Vital Signs Last 24 Hrs  T(C): 36.4 (19 May 2023 22:09), Max: 36.8 (19 May 2023 02:24)  T(F): 97.5 (19 May 2023 22:09), Max: 98.2 (19 May 2023 02:24)  HR: 65 (19 May 2023 22:09) (60 - 89)  BP: 150/77 (19 May 2023 22:09) (116/69 - 152/83)  BP(mean): 102 (19 May 2023 19:30) (102 - 106)  RR: 17 (19 May 2023 22:09) (14 - 18)  SpO2: 100% (19 May 2023 22:09) (92% - 100%)    Parameters below as of 19 May 2023 22:09  Patient On (Oxygen Delivery Method): room air        FOCUSED PHYSICAL EXAM:    LABS:                        6.9    7.62  )-----------( 244      ( 19 May 2023 18:23 )             19.2     05-19    135  |  107  |  26<H>  ----------------------------<  133<H>  3.9   |  21<L>  |  1.79<H>    Ca    8.6      19 May 2023 02:40    TPro  7.3  /  Alb  3.5  /  TBili  8.0<H>  /  DBili  3.7<H>  /  AST  165<H>  /  ALT  77<H>  /  AlkPhos  118  05-19    PLAN:   - Post transfusion CBC - at 2am,   - IV fluid - NS @ 75 ml/hrs.     FOLLOW UP / RESULT:   - Monitor patient response to transfusion,   - Maintain safety and comfort measures,   - Follow-up morning labs,  - US venous duplex logan. LE (r/o DVT).

## 2023-08-17 ENCOUNTER — INPATIENT (INPATIENT)
Facility: HOSPITAL | Age: 43
LOS: 1 days | Discharge: HOME | DRG: 812 | End: 2023-08-19
Attending: STUDENT IN AN ORGANIZED HEALTH CARE EDUCATION/TRAINING PROGRAM | Admitting: STUDENT IN AN ORGANIZED HEALTH CARE EDUCATION/TRAINING PROGRAM
Payer: MEDICAID

## 2023-08-17 VITALS
RESPIRATION RATE: 16 BRPM | HEIGHT: 69 IN | TEMPERATURE: 99 F | SYSTOLIC BLOOD PRESSURE: 119 MMHG | OXYGEN SATURATION: 91 % | HEART RATE: 90 BPM | DIASTOLIC BLOOD PRESSURE: 78 MMHG | WEIGHT: 190.04 LBS

## 2023-08-17 DIAGNOSIS — Z90.49 ACQUIRED ABSENCE OF OTHER SPECIFIED PARTS OF DIGESTIVE TRACT: Chronic | ICD-10-CM

## 2023-08-17 PROCEDURE — 93010 ELECTROCARDIOGRAM REPORT: CPT

## 2023-08-17 PROCEDURE — 99285 EMERGENCY DEPT VISIT HI MDM: CPT | Mod: 25

## 2023-08-17 RX ORDER — HYDROMORPHONE HYDROCHLORIDE 2 MG/ML
2 INJECTION INTRAMUSCULAR; INTRAVENOUS; SUBCUTANEOUS ONCE
Refills: 0 | Status: DISCONTINUED | OUTPATIENT
Start: 2023-08-17 | End: 2023-08-17

## 2023-08-17 NOTE — ED ADULT TRIAGE NOTE - CHIEF COMPLAINT QUOTE
c/o body pain and nauseus, headache h/o sickle cell disease with port to rt. upper chest . denies sob

## 2023-08-18 DIAGNOSIS — F11.20 OPIOID DEPENDENCE, UNCOMPLICATED: ICD-10-CM

## 2023-08-18 DIAGNOSIS — E87.8 OTHER DISORDERS OF ELECTROLYTE AND FLUID BALANCE, NOT ELSEWHERE CLASSIFIED: ICD-10-CM

## 2023-08-18 DIAGNOSIS — R74.01 ELEVATION OF LEVELS OF LIVER TRANSAMINASE LEVELS: ICD-10-CM

## 2023-08-18 DIAGNOSIS — D64.9 ANEMIA, UNSPECIFIED: ICD-10-CM

## 2023-08-18 DIAGNOSIS — D57.00 HB-SS DISEASE WITH CRISIS, UNSPECIFIED: ICD-10-CM

## 2023-08-18 DIAGNOSIS — M10.9 GOUT, UNSPECIFIED: ICD-10-CM

## 2023-08-18 DIAGNOSIS — N17.9 ACUTE KIDNEY FAILURE, UNSPECIFIED: ICD-10-CM

## 2023-08-18 DIAGNOSIS — Z29.9 ENCOUNTER FOR PROPHYLACTIC MEASURES, UNSPECIFIED: ICD-10-CM

## 2023-08-18 LAB
ALBUMIN SERPL ELPH-MCNC: 3.5 G/DL — SIGNIFICANT CHANGE UP (ref 3.5–5)
ALP SERPL-CCNC: 115 U/L — SIGNIFICANT CHANGE UP (ref 40–120)
ALT FLD-CCNC: 51 U/L DA — SIGNIFICANT CHANGE UP (ref 10–60)
ANION GAP SERPL CALC-SCNC: 5 MMOL/L — SIGNIFICANT CHANGE UP (ref 5–17)
ANISOCYTOSIS BLD QL: SIGNIFICANT CHANGE UP
APTT BLD: 43.5 SEC — HIGH (ref 24.5–35.6)
AST SERPL-CCNC: 125 U/L — HIGH (ref 10–40)
BASOPHILS # BLD AUTO: 0 K/UL — SIGNIFICANT CHANGE UP (ref 0–0.2)
BASOPHILS NFR BLD AUTO: 0 % — SIGNIFICANT CHANGE UP (ref 0–2)
BILIRUB SERPL-MCNC: 7.5 MG/DL — HIGH (ref 0.2–1.2)
BLD GP AB SCN SERPL QL: SIGNIFICANT CHANGE UP
BUN SERPL-MCNC: 23 MG/DL — HIGH (ref 7–18)
CALCIUM SERPL-MCNC: 8.6 MG/DL — SIGNIFICANT CHANGE UP (ref 8.4–10.5)
CHLORIDE SERPL-SCNC: 106 MMOL/L — SIGNIFICANT CHANGE UP (ref 96–108)
CO2 SERPL-SCNC: 22 MMOL/L — SIGNIFICANT CHANGE UP (ref 22–31)
CREAT SERPL-MCNC: 1.35 MG/DL — HIGH (ref 0.5–1.3)
EGFR: 66 ML/MIN/1.73M2 — SIGNIFICANT CHANGE UP
EOSINOPHIL # BLD AUTO: 0.21 K/UL — SIGNIFICANT CHANGE UP (ref 0–0.5)
EOSINOPHIL NFR BLD AUTO: 2 % — SIGNIFICANT CHANGE UP (ref 0–6)
FLUAV AG NPH QL: SIGNIFICANT CHANGE UP
FLUBV AG NPH QL: SIGNIFICANT CHANGE UP
GLUCOSE SERPL-MCNC: 94 MG/DL — SIGNIFICANT CHANGE UP (ref 70–99)
HCT VFR BLD CALC: 13.8 % — CRITICAL LOW (ref 39–50)
HGB BLD-MCNC: 5 G/DL — CRITICAL LOW (ref 13–17)
HYPOCHROMIA BLD QL: SIGNIFICANT CHANGE UP
INR BLD: 1.09 RATIO — SIGNIFICANT CHANGE UP (ref 0.85–1.18)
LYMPHOCYTES # BLD AUTO: 3.36 K/UL — HIGH (ref 1–3.3)
LYMPHOCYTES # BLD AUTO: 32 % — SIGNIFICANT CHANGE UP (ref 13–44)
MANUAL SMEAR VERIFICATION: SIGNIFICANT CHANGE UP
MCHC RBC-ENTMCNC: 32.1 PG — SIGNIFICANT CHANGE UP (ref 27–34)
MCHC RBC-ENTMCNC: 36.2 GM/DL — HIGH (ref 32–36)
MCV RBC AUTO: 88.5 FL — SIGNIFICANT CHANGE UP (ref 80–100)
MONOCYTES # BLD AUTO: 1.26 K/UL — HIGH (ref 0–0.9)
MONOCYTES NFR BLD AUTO: 12 % — SIGNIFICANT CHANGE UP (ref 2–14)
NEUTROPHILS # BLD AUTO: 5.25 K/UL — SIGNIFICANT CHANGE UP (ref 1.8–7.4)
NEUTROPHILS NFR BLD AUTO: 50 % — SIGNIFICANT CHANGE UP (ref 43–77)
NRBC # BLD: 0 /100 — SIGNIFICANT CHANGE UP (ref 0–0)
OVALOCYTES BLD QL SMEAR: SLIGHT — SIGNIFICANT CHANGE UP
PLAT MORPH BLD: NORMAL — SIGNIFICANT CHANGE UP
PLATELET # BLD AUTO: 237 K/UL — SIGNIFICANT CHANGE UP (ref 150–400)
PLATELET COUNT - ESTIMATE: NORMAL — SIGNIFICANT CHANGE UP
POIKILOCYTOSIS BLD QL AUTO: SIGNIFICANT CHANGE UP
POLYCHROMASIA BLD QL SMEAR: SLIGHT — SIGNIFICANT CHANGE UP
POTASSIUM SERPL-MCNC: 4.6 MMOL/L — SIGNIFICANT CHANGE UP (ref 3.5–5.3)
POTASSIUM SERPL-SCNC: 4.6 MMOL/L — SIGNIFICANT CHANGE UP (ref 3.5–5.3)
PROT SERPL-MCNC: 7.2 G/DL — SIGNIFICANT CHANGE UP (ref 6–8.3)
PROTHROM AB SERPL-ACNC: 12.4 SEC — SIGNIFICANT CHANGE UP (ref 9.5–13)
RBC # BLD: 1.56 M/UL — LOW (ref 4.2–5.8)
RBC # FLD: 25.3 % — HIGH (ref 10.3–14.5)
RBC BLD AUTO: ABNORMAL
SARS-COV-2 RNA SPEC QL NAA+PROBE: SIGNIFICANT CHANGE UP
SCHISTOCYTES BLD QL AUTO: SLIGHT — SIGNIFICANT CHANGE UP
SICKLE CELLS BLD QL SMEAR: SIGNIFICANT CHANGE UP
SODIUM SERPL-SCNC: 133 MMOL/L — LOW (ref 135–145)
TARGETS BLD QL SMEAR: SLIGHT — SIGNIFICANT CHANGE UP
VARIANT LYMPHS # BLD: 4 % — SIGNIFICANT CHANGE UP (ref 0–6)
WBC # BLD: 10.5 K/UL — SIGNIFICANT CHANGE UP (ref 3.8–10.5)
WBC # FLD AUTO: 10.5 K/UL — SIGNIFICANT CHANGE UP (ref 3.8–10.5)

## 2023-08-18 PROCEDURE — 99222 1ST HOSP IP/OBS MODERATE 55: CPT

## 2023-08-18 PROCEDURE — 99223 1ST HOSP IP/OBS HIGH 75: CPT

## 2023-08-18 RX ORDER — HYDROMORPHONE HYDROCHLORIDE 2 MG/ML
1 INJECTION INTRAMUSCULAR; INTRAVENOUS; SUBCUTANEOUS ONCE
Refills: 0 | Status: DISCONTINUED | OUTPATIENT
Start: 2023-08-18 | End: 2023-08-18

## 2023-08-18 RX ORDER — LIDOCAINE 4 G/100G
1 CREAM TOPICAL ONCE
Refills: 0 | Status: COMPLETED | OUTPATIENT
Start: 2023-08-18 | End: 2023-08-18

## 2023-08-18 RX ORDER — NALOXONE HYDROCHLORIDE 4 MG/.1ML
0.4 SPRAY NASAL ONCE
Refills: 0 | Status: DISCONTINUED | OUTPATIENT
Start: 2023-08-18 | End: 2023-08-19

## 2023-08-18 RX ORDER — DIPHENHYDRAMINE HCL 50 MG
25 CAPSULE ORAL ONCE
Refills: 0 | Status: COMPLETED | OUTPATIENT
Start: 2023-08-18 | End: 2023-08-18

## 2023-08-18 RX ORDER — DIAZEPAM 5 MG
5 TABLET ORAL ONCE
Refills: 0 | Status: DISCONTINUED | OUTPATIENT
Start: 2023-08-18 | End: 2023-08-18

## 2023-08-18 RX ORDER — HYDROMORPHONE HYDROCHLORIDE 2 MG/ML
0.8 INJECTION INTRAMUSCULAR; INTRAVENOUS; SUBCUTANEOUS ONCE
Refills: 0 | Status: DISCONTINUED | OUTPATIENT
Start: 2023-08-18 | End: 2023-08-18

## 2023-08-18 RX ORDER — LIDOCAINE 4 G/100G
1 CREAM TOPICAL DAILY
Refills: 0 | Status: DISCONTINUED | OUTPATIENT
Start: 2023-08-18 | End: 2023-08-19

## 2023-08-18 RX ORDER — FOLIC ACID 0.8 MG
1 TABLET ORAL EVERY 24 HOURS
Refills: 0 | Status: DISCONTINUED | OUTPATIENT
Start: 2023-08-18 | End: 2023-08-19

## 2023-08-18 RX ORDER — HEPARIN SODIUM 5000 [USP'U]/ML
5000 INJECTION INTRAVENOUS; SUBCUTANEOUS EVERY 12 HOURS
Refills: 0 | Status: DISCONTINUED | OUTPATIENT
Start: 2023-08-18 | End: 2023-08-19

## 2023-08-18 RX ORDER — HEPARIN SODIUM 5000 [USP'U]/ML
5300 INJECTION INTRAVENOUS; SUBCUTANEOUS EVERY 6 HOURS
Refills: 0 | Status: DISCONTINUED | OUTPATIENT
Start: 2023-08-18 | End: 2023-08-18

## 2023-08-18 RX ORDER — TICAGRELOR 90 MG/1
180 TABLET ORAL ONCE
Refills: 0 | Status: DISCONTINUED | OUTPATIENT
Start: 2023-08-18 | End: 2023-08-18

## 2023-08-18 RX ORDER — ALLOPURINOL 300 MG
100 TABLET ORAL DAILY
Refills: 0 | Status: DISCONTINUED | OUTPATIENT
Start: 2023-08-18 | End: 2023-08-19

## 2023-08-18 RX ORDER — HYDROMORPHONE HYDROCHLORIDE 2 MG/ML
0.2 INJECTION INTRAMUSCULAR; INTRAVENOUS; SUBCUTANEOUS ONCE
Refills: 0 | Status: DISCONTINUED | OUTPATIENT
Start: 2023-08-18 | End: 2023-08-18

## 2023-08-18 RX ORDER — HYDROMORPHONE HYDROCHLORIDE 2 MG/ML
1 INJECTION INTRAMUSCULAR; INTRAVENOUS; SUBCUTANEOUS EVERY 4 HOURS
Refills: 0 | Status: DISCONTINUED | OUTPATIENT
Start: 2023-08-18 | End: 2023-08-18

## 2023-08-18 RX ORDER — POLYETHYLENE GLYCOL 3350 17 G/17G
17 POWDER, FOR SOLUTION ORAL DAILY
Refills: 0 | Status: DISCONTINUED | OUTPATIENT
Start: 2023-08-18 | End: 2023-08-19

## 2023-08-18 RX ORDER — DIPHENHYDRAMINE HCL 50 MG
50 CAPSULE ORAL ONCE
Refills: 0 | Status: COMPLETED | OUTPATIENT
Start: 2023-08-18 | End: 2023-08-18

## 2023-08-18 RX ORDER — HEPARIN SODIUM 5000 [USP'U]/ML
INJECTION INTRAVENOUS; SUBCUTANEOUS
Qty: 25000 | Refills: 0 | Status: DISCONTINUED | OUTPATIENT
Start: 2023-08-18 | End: 2023-08-18

## 2023-08-18 RX ORDER — SENNA PLUS 8.6 MG/1
2 TABLET ORAL AT BEDTIME
Refills: 0 | Status: DISCONTINUED | OUTPATIENT
Start: 2023-08-18 | End: 2023-08-19

## 2023-08-18 RX ORDER — HYDROMORPHONE HYDROCHLORIDE 2 MG/ML
2 INJECTION INTRAMUSCULAR; INTRAVENOUS; SUBCUTANEOUS ONCE
Refills: 0 | Status: DISCONTINUED | OUTPATIENT
Start: 2023-08-18 | End: 2023-08-18

## 2023-08-18 RX ORDER — HYDROMORPHONE HYDROCHLORIDE 2 MG/ML
2 INJECTION INTRAMUSCULAR; INTRAVENOUS; SUBCUTANEOUS
Refills: 0 | Status: DISCONTINUED | OUTPATIENT
Start: 2023-08-18 | End: 2023-08-19

## 2023-08-18 RX ORDER — KETOROLAC TROMETHAMINE 30 MG/ML
15 SYRINGE (ML) INJECTION ONCE
Refills: 0 | Status: DISCONTINUED | OUTPATIENT
Start: 2023-08-18 | End: 2023-08-18

## 2023-08-18 RX ORDER — SODIUM CHLORIDE 9 MG/ML
1000 INJECTION, SOLUTION INTRAVENOUS
Refills: 0 | Status: DISCONTINUED | OUTPATIENT
Start: 2023-08-18 | End: 2023-08-19

## 2023-08-18 RX ORDER — MORPHINE SULFATE 50 MG/1
4 CAPSULE, EXTENDED RELEASE ORAL ONCE
Refills: 0 | Status: DISCONTINUED | OUTPATIENT
Start: 2023-08-18 | End: 2023-08-18

## 2023-08-18 RX ORDER — HYDROMORPHONE HYDROCHLORIDE 2 MG/ML
2 INJECTION INTRAMUSCULAR; INTRAVENOUS; SUBCUTANEOUS EVERY 4 HOURS
Refills: 0 | Status: DISCONTINUED | OUTPATIENT
Start: 2023-08-18 | End: 2023-08-18

## 2023-08-18 RX ORDER — ACETAMINOPHEN 500 MG
650 TABLET ORAL EVERY 6 HOURS
Refills: 0 | Status: DISCONTINUED | OUTPATIENT
Start: 2023-08-18 | End: 2023-08-19

## 2023-08-18 RX ORDER — HEPARIN SODIUM 5000 [USP'U]/ML
5000 INJECTION INTRAVENOUS; SUBCUTANEOUS ONCE
Refills: 0 | Status: DISCONTINUED | OUTPATIENT
Start: 2023-08-18 | End: 2023-08-18

## 2023-08-18 RX ADMIN — HYDROMORPHONE HYDROCHLORIDE 0.2 MILLIGRAM(S): 2 INJECTION INTRAMUSCULAR; INTRAVENOUS; SUBCUTANEOUS at 11:54

## 2023-08-18 RX ADMIN — HYDROMORPHONE HYDROCHLORIDE 2 MILLIGRAM(S): 2 INJECTION INTRAMUSCULAR; INTRAVENOUS; SUBCUTANEOUS at 07:00

## 2023-08-18 RX ADMIN — LIDOCAINE 1 PATCH: 4 CREAM TOPICAL at 14:08

## 2023-08-18 RX ADMIN — Medication 100 MILLIGRAM(S): at 17:45

## 2023-08-18 RX ADMIN — HYDROMORPHONE HYDROCHLORIDE 2 MILLIGRAM(S): 2 INJECTION INTRAMUSCULAR; INTRAVENOUS; SUBCUTANEOUS at 00:36

## 2023-08-18 RX ADMIN — HYDROMORPHONE HYDROCHLORIDE 0.8 MILLIGRAM(S): 2 INJECTION INTRAMUSCULAR; INTRAVENOUS; SUBCUTANEOUS at 13:05

## 2023-08-18 RX ADMIN — HYDROMORPHONE HYDROCHLORIDE 1 MILLIGRAM(S): 2 INJECTION INTRAMUSCULAR; INTRAVENOUS; SUBCUTANEOUS at 16:09

## 2023-08-18 RX ADMIN — Medication 50 MILLIGRAM(S): at 06:16

## 2023-08-18 RX ADMIN — HYDROMORPHONE HYDROCHLORIDE 2 MILLIGRAM(S): 2 INJECTION INTRAMUSCULAR; INTRAVENOUS; SUBCUTANEOUS at 23:02

## 2023-08-18 RX ADMIN — SODIUM CHLORIDE 150 MILLILITER(S): 9 INJECTION, SOLUTION INTRAVENOUS at 15:36

## 2023-08-18 RX ADMIN — Medication 25 MILLIGRAM(S): at 09:38

## 2023-08-18 RX ADMIN — HYDROMORPHONE HYDROCHLORIDE 2 MILLIGRAM(S): 2 INJECTION INTRAMUSCULAR; INTRAVENOUS; SUBCUTANEOUS at 19:53

## 2023-08-18 RX ADMIN — LIDOCAINE 1 PATCH: 4 CREAM TOPICAL at 01:58

## 2023-08-18 RX ADMIN — Medication 1 MILLIGRAM(S): at 17:45

## 2023-08-18 RX ADMIN — HYDROMORPHONE HYDROCHLORIDE 2 MILLIGRAM(S): 2 INJECTION INTRAMUSCULAR; INTRAVENOUS; SUBCUTANEOUS at 03:43

## 2023-08-18 RX ADMIN — HYDROMORPHONE HYDROCHLORIDE 1 MILLIGRAM(S): 2 INJECTION INTRAMUSCULAR; INTRAVENOUS; SUBCUTANEOUS at 15:54

## 2023-08-18 RX ADMIN — HEPARIN SODIUM 5000 UNIT(S): 5000 INJECTION INTRAVENOUS; SUBCUTANEOUS at 17:45

## 2023-08-18 RX ADMIN — HYDROMORPHONE HYDROCHLORIDE 2 MILLIGRAM(S): 2 INJECTION INTRAMUSCULAR; INTRAVENOUS; SUBCUTANEOUS at 06:16

## 2023-08-18 RX ADMIN — HYDROMORPHONE HYDROCHLORIDE 2 MILLIGRAM(S): 2 INJECTION INTRAMUSCULAR; INTRAVENOUS; SUBCUTANEOUS at 20:08

## 2023-08-18 RX ADMIN — HYDROMORPHONE HYDROCHLORIDE 0.2 MILLIGRAM(S): 2 INJECTION INTRAMUSCULAR; INTRAVENOUS; SUBCUTANEOUS at 11:37

## 2023-08-18 RX ADMIN — HYDROMORPHONE HYDROCHLORIDE 2 MILLIGRAM(S): 2 INJECTION INTRAMUSCULAR; INTRAVENOUS; SUBCUTANEOUS at 23:17

## 2023-08-18 RX ADMIN — Medication 5 MILLIGRAM(S): at 01:57

## 2023-08-18 RX ADMIN — Medication 15 MILLIGRAM(S): at 03:43

## 2023-08-18 RX ADMIN — HYDROMORPHONE HYDROCHLORIDE 2 MILLIGRAM(S): 2 INJECTION INTRAMUSCULAR; INTRAVENOUS; SUBCUTANEOUS at 01:46

## 2023-08-18 RX ADMIN — HYDROMORPHONE HYDROCHLORIDE 0.8 MILLIGRAM(S): 2 INJECTION INTRAMUSCULAR; INTRAVENOUS; SUBCUTANEOUS at 13:20

## 2023-08-18 RX ADMIN — SENNA PLUS 2 TABLET(S): 8.6 TABLET ORAL at 21:36

## 2023-08-18 RX ADMIN — Medication 15 MILLIGRAM(S): at 01:57

## 2023-08-18 RX ADMIN — LIDOCAINE 1 PATCH: 4 CREAM TOPICAL at 10:49

## 2023-08-18 NOTE — CONSULT NOTE ADULT - SUBJECTIVE AND OBJECTIVE BOX
Source of information: [facility]OL[facility] VARGAS, Chart review  Patient language: English  : n/a    HPI:  Pt is a 42 yo M w hx sickle cell anemia (baseline Hb 5-6), multiple admissions for pain crisis most recently 5/2023, gout, presenting from o/p hematologist Dr. Jordan's office for anemia and pain. Pt says that he has been having diffuse pain for the past few days with increased use of his home oxycodone 30 mg, which he has been taking q3-4h instead of q8h. He said he had some headache and nausea as well. When he presented to Dr. Jordan's office he was found to have Hb 5, and was sent to ED for further evaluation.    On current evaluation, pt complains of lower back pain but says headache has resolved, and that the nausea is very mild and has greatly improved. He denies SOB, chest pain, dysuria, diarrhea.    ED course:  Hb 5  s/p 2 U PRBC  s/p diazepam, diphenhydramine, dilaudid, toradol     (18 Aug 2023 11:48)      Patient is a 43y old  Male who presents with a chief complaint of sickle cell crisis (18 Aug 2023 15:41)  . Pt is admitted for ..., being treated with .... Pain consulted for .... Pt seen and examined at bedside. Reports pain score ***  SCALE USED: (1-10 VNRS). Pt describes pain as .... radiating to... alleviated by pain medication... exacerbated by movement... Pt tolerating PO diet. Denies lethargy, nausea, vomiting, constipation, itchiness. Reports last BM ***. Patient stated goal for pain control: to be able to take deep breaths, get out of bed to chair and ambulate with tolerable pain control. Pt denies taking medications for pain at home.     PAST MEDICAL & SURGICAL HISTORY:  Sickle cell anemia      Gout      History of cholecystectomy          FAMILY HISTORY:  Family history of sickle cell trait (Father, Mother)        Social History:  not working (18 Aug 2023 11:48)   [ ] Denies ETOH use, illicit drug use and smoking    Allergies    ceftriaxone (Anaphylaxis)  penicillin (Pruritus)  hydroxyurea (Other)  piperacillin-tazobactam (Urticaria)    Intolerances        MEDICATIONS  (STANDING):  allopurinol 100 milliGRAM(s) Oral daily  folic acid 1 milliGRAM(s) Oral every 24 hours  heparin   Injectable 5000 Unit(s) SubCutaneous every 12 hours  lactated ringers. 1000 milliLiter(s) (150 mL/Hr) IV Continuous <Continuous>  lidocaine   4% Patch 1 Patch Transdermal daily  naloxone Injectable 0.4 milliGRAM(s) IV Push once  polyethylene glycol 3350 17 Gram(s) Oral daily  senna 2 Tablet(s) Oral at bedtime    MEDICATIONS  (PRN):  acetaminophen     Tablet .. 650 milliGRAM(s) Oral every 6 hours PRN Temp greater or equal to 38C (100.4F), Mild Pain (1 - 3)  aluminum hydroxide/magnesium hydroxide/simethicone Suspension 30 milliLiter(s) Oral every 4 hours PRN Dyspepsia  bisacodyl 5 milliGRAM(s) Oral daily PRN Constipation  HYDROmorphone  Injectable 1 milliGRAM(s) IV Push every 4 hours PRN Moderate Pain (4 - 6)  HYDROmorphone  Injectable 2 milliGRAM(s) IV Push every 4 hours PRN Severe Pain (7 - 10)  HYDROmorphone  Injectable 1 milliGRAM(s) IV Push once PRN Severe Pain (7 - 10)      Vital Signs Last 24 Hrs  T(C): 36.7 (18 Aug 2023 11:04), Max: 37.2 (17 Aug 2023 23:01)  T(F): 98.1 (18 Aug 2023 11:04), Max: 98.9 (17 Aug 2023 23:01)  HR: 78 (18 Aug 2023 11:04) (74 - 90)  BP: 116/68 (18 Aug 2023 11:04) (116/68 - 131/79)  BP(mean): --  RR: 16 (18 Aug 2023 11:04) (16 - 16)  SpO2: 91% (18 Aug 2023 11:04) (91% - 94%)    Parameters below as of 18 Aug 2023 11:04  Patient On (Oxygen Delivery Method): room air        LABS: Reviewed.                          5.0    10.50 )-----------( 237      ( 18 Aug 2023 01:00 )             13.8     08-18    133<L>  |  106  |  23<H>  ----------------------------<  94  4.6   |  22  |  1.35<H>    Ca    8.6      18 Aug 2023 01:00    TPro  7.2  /  Alb  3.5  /  TBili  7.5<H>  /  DBili  x   /  AST  125<H>  /  ALT  51  /  AlkPhos  115  08-18    PT/INR - ( 18 Aug 2023 01:00 )   PT: 12.4 sec;   INR: 1.09 ratio         PTT - ( 18 Aug 2023 01:00 )  PTT:43.5 sec  LIVER FUNCTIONS - ( 18 Aug 2023 01:00 )  Alb: 3.5 g/dL / Pro: 7.2 g/dL / ALK PHOS: 115 U/L / ALT: 51 U/L DA / AST: 125 U/L / GGT: x           Urinalysis Basic - ( 18 Aug 2023 01:00 )    Color: x / Appearance: x / SG: x / pH: x  Gluc: 94 mg/dL / Ketone: x  / Bili: x / Urobili: x   Blood: x / Protein: x / Nitrite: x   Leuk Esterase: x / RBC: x / WBC x   Sq Epi: x / Non Sq Epi: x / Bacteria: x      CAPILLARY BLOOD GLUCOSE            Radiology: Reviewed.     ORT Score -   Family Hx of substance abuse	Female	      Male  Alcohol 	                                           1                     3  Illegal drugs	                                   2                     3  Rx drugs                                           4 	                  4  Personal Hx of substance abuse		  Alcohol 	                                          3	                  3  Illegal drugs                                     4	                  4  Rx drugs                                            5 	                  5  Age between 16- 45 years	           1                     1  hx preadolescent sexual abuse	   3 	                  0  Psychological disease		  ADD, OCD, bipolar, schizophrenia   2	          2  Depression                                           1 	          1  Total: 0    a score of 3 or lower indicates low risk for opioid abuse		  a score of 4-7 indicates moderate risk for opioid abuse		  a score of 8 or higher indicates high risk for opioid abuse  	  4AT (Assessment test for delirium & cognitive impairment)  _________________________________________________________  [1] ALERTNESS  This includes patients who may be markedly drowsy (eg. difficult to rouse and/or obviously sleepy  during assessment) or agitated/hyperactive. Observe the patient. If asleep, attempt to wake with  speech or gentle touch on shoulder. Ask the patient to state their name and address to assist rating.  Normal (fully alert, but not agitated, throughout assessment) 0  Mild sleepiness for <10 seconds after waking, then normal 0  Clearly abnormal 4    [2] AMT4  Age, date of birth, place (name of the hospital or building), current year.  No mistakes 0  1 mistake 1  2 or more mistakes/untestable 2    [3] ATTENTION  Ask the patient: “Please tell me the months of the year in backwards order, starting at December.”  To assist initial understanding one prompt of “what is the month before December?” is permitted.  Months of the year backwards Achieves 7 months or more correctly 0  Starts but scores <7 months / refuses to start 1   Untestable (cannot start because unwell, drowsy, inattentive) 2    [4] ACUTE CHANGE OR FLUCTUATING COURSE  Evidence of significant change or fluctuation in: alertness, cognition, other mental function  (eg. paranoia, hallucinations) arising over the last 2 weeks and still evident in last 24hrs  No 0  Yes 4    4 or above: possible delirium +/- cognitive impairment  1-3: possible cognitive impairment  0: delirium or severe cognitive impairment unlikely (but delirium still possible if [4] information incomplete)    4AT SCORE: 0    REVIEW OF SYSTEMS:  CONSTITUTIONAL: No fever or fatigue  HEENT:  No difficulty hearing, no change in vision  NECK: No pain or stiffness  RESPIRATORY: No cough, wheezing, chills or hemoptysis; No shortness of breath  CARDIOVASCULAR: No chest pain, palpitations, dizziness, or leg swelling  GASTROINTESTINAL: No loss of appetite, decreased PO intake. No abdominal or epigastric pain. No nausea, vomiting; No diarrhea or constipation.   GENITOURINARY: No dysuria, frequency, hematuria, retention or incontinence  MUSCULOSKELETAL: No joint pain or swelling; No muscle, back, or extremity pain, no upper or lower motor strength weakness, no saddle anesthesia, bowel/bladder incontinence, no falls   NEURO: No headaches, No numbness/tingling b/l LE, No weakness  ENDOCRINE: No polyuria, polydipsia, heat or cold intolerance; No hair loss  PSYCHIATRIC: No depression, anxiety or difficulty sleeping    PHYSICAL EXAM:  GENERAL:  Alert & Oriented X4, cooperative, NAD, Good concentration. Speech is clear.   RESPIRATORY: Respirations even and unlabored. Clear to auscultation bilaterally; No rales, rhonchi, wheezing, or rubs  CARDIOVASCULAR: Normal S1/S2, regular rate and rhythm; No murmurs, rubs, or gallops. No JVD.   GASTROINTESTINAL:  Soft, Nontender, Nondistended; Bowel sounds present  PERIPHERAL VASCULAR:  Extremities warm without edema. 2+ Peripheral Pulses, No cyanosis, No calf tenderness  MUSCULOSKELETAL: Motor Strength 5/5 B/L upper and lower extremities; moves all extremities equally against gravity; ROM intact; negative SLR; No tenderness on palpation of all joints.   SKIN: Warm, dry, intact. No rashes, lesions, scars or wounds.     Risk factors associated with adverse outcomes related to opioid treatment  [ ]  Concurrent benzodiazepine use  [ ]  History/ Active substance use or alcohol use disorder  [ ] Psychiatric co-morbidity  [ ] Sleep apnea  [ ] COPD  [ ] BMI> 35  [ ] Liver dysfunction  [ ] Renal dysfunction  [ ] CHF  [ ] Smoker  [ ]  Age > 60 years    [ ]  [place]  Reviewed and Copied to Chart. See below.    Plan of care and goal oriented pain management treatment options were discussed with patient and /or primary care giver; all questions and concerns were addressed and care was aligned with patient's wishes.    Educated patient on goal oriented pain management treatment options        Source of information: [facility]ARGAS, Chart review  Patient language: English  : n/a    HPI:  Pt is a 42 yo M w hx sickle cell anemia (baseline Hb 5-6), multiple admissions for pain crisis most recently 5/2023, gout, presenting from o/p hematologist Dr. Jordan's office for anemia and pain. Pt says that he has been having diffuse pain for the past few days with increased use of his home oxycodone 30 mg, which he has been taking q3-4h instead of q8h. He said he had some headache and nausea as well. When he presented to Dr. Jordan's office he was found to have Hb 5, and was sent to ED for further evaluation.    On current evaluation, pt complains of lower back pain but says headache has resolved, and that the nausea is very mild and has greatly improved. He denies SOB, chest pain, dysuria, diarrhea.    ED course:  Hb 5  s/p 2 U PRBC  s/p diazepam, diphenhydramine, dilaudid, toradol     (18 Aug 2023 11:48)    Patient is a 42y old  Male with PMH sickle cell disease, gout who presents with a chief complaint of Generalized pain. Dx with sickle cell crisis. Retic count 14.5%. H/H 5/14.1. Received 1 unit PRBC, awaiting second unit PRBC. US Abdomen with no acute findings. Pain consulted for sickle cell pain on 5/19. (Last seen by pain management during prior admission on 1/7/2023). Pt seen and examined at bedside in ED. Sitting in bed, eating, no acute distress noted. Pt reports lumbar back pain, rib pain, bilateral arm pain. Denies any precipitating pain factors. Pt with chronic pain and is opioid dependent. Per istop review, on oxycodone 30mg PO 6 times/ day, last prescription filled on 4/25/2023, prescribed by MD Jordan (Hem-Onc). Currently rating lumbar back pain score 8/10 and somewhat tolerable SCALE USED: (1-10 VNRS). Pt reports has being medicated with Dilaudid IVP in ED with. Pt describes pain as sharp, non- radiating, alleviated by IV pain medication exacerbated by movement. Pt tolerating PO diet. Denies lethargy, chest pain, SOB, nausea, vomiting, constipation. Reports last BM yesterday 5/17. Patient stated goal for pain control: to be able to take deep breaths, get out of bed to chair and ambulate with tolerable pain control.         PAST MEDICAL & SURGICAL HISTORY:  Sickle cell anemia      Gout      History of cholecystectomy          FAMILY HISTORY:  Family history of sickle cell trait (Father, Mother)        Social History:  not working (18 Aug 2023 11:48)   [ ] Denies ETOH use, illicit drug use and smoking    Allergies    ceftriaxone (Anaphylaxis)  penicillin (Pruritus)  hydroxyurea (Other)  piperacillin-tazobactam (Urticaria)    Intolerances        MEDICATIONS  (STANDING):  allopurinol 100 milliGRAM(s) Oral daily  folic acid 1 milliGRAM(s) Oral every 24 hours  heparin   Injectable 5000 Unit(s) SubCutaneous every 12 hours  lactated ringers. 1000 milliLiter(s) (150 mL/Hr) IV Continuous <Continuous>  lidocaine   4% Patch 1 Patch Transdermal daily  naloxone Injectable 0.4 milliGRAM(s) IV Push once  polyethylene glycol 3350 17 Gram(s) Oral daily  senna 2 Tablet(s) Oral at bedtime    MEDICATIONS  (PRN):  acetaminophen     Tablet .. 650 milliGRAM(s) Oral every 6 hours PRN Temp greater or equal to 38C (100.4F), Mild Pain (1 - 3)  aluminum hydroxide/magnesium hydroxide/simethicone Suspension 30 milliLiter(s) Oral every 4 hours PRN Dyspepsia  bisacodyl 5 milliGRAM(s) Oral daily PRN Constipation  HYDROmorphone  Injectable 1 milliGRAM(s) IV Push every 4 hours PRN Moderate Pain (4 - 6)  HYDROmorphone  Injectable 2 milliGRAM(s) IV Push every 4 hours PRN Severe Pain (7 - 10)  HYDROmorphone  Injectable 1 milliGRAM(s) IV Push once PRN Severe Pain (7 - 10)      Vital Signs Last 24 Hrs  T(C): 36.7 (18 Aug 2023 11:04), Max: 37.2 (17 Aug 2023 23:01)  T(F): 98.1 (18 Aug 2023 11:04), Max: 98.9 (17 Aug 2023 23:01)  HR: 78 (18 Aug 2023 11:04) (74 - 90)  BP: 116/68 (18 Aug 2023 11:04) (116/68 - 131/79)  BP(mean): --  RR: 16 (18 Aug 2023 11:04) (16 - 16)  SpO2: 91% (18 Aug 2023 11:04) (91% - 94%)    Parameters below as of 18 Aug 2023 11:04  Patient On (Oxygen Delivery Method): room air        LABS: Reviewed.                          5.0    10.50 )-----------( 237      ( 18 Aug 2023 01:00 )             13.8     08-18    133<L>  |  106  |  23<H>  ----------------------------<  94  4.6   |  22  |  1.35<H>    Ca    8.6      18 Aug 2023 01:00    TPro  7.2  /  Alb  3.5  /  TBili  7.5<H>  /  DBili  x   /  AST  125<H>  /  ALT  51  /  AlkPhos  115  08-18    PT/INR - ( 18 Aug 2023 01:00 )   PT: 12.4 sec;   INR: 1.09 ratio         PTT - ( 18 Aug 2023 01:00 )  PTT:43.5 sec  LIVER FUNCTIONS - ( 18 Aug 2023 01:00 )  Alb: 3.5 g/dL / Pro: 7.2 g/dL / ALK PHOS: 115 U/L / ALT: 51 U/L DA / AST: 125 U/L / GGT: x           Urinalysis Basic - ( 18 Aug 2023 01:00 )    Color: x / Appearance: x / SG: x / pH: x  Gluc: 94 mg/dL / Ketone: x  / Bili: x / Urobili: x   Blood: x / Protein: x / Nitrite: x   Leuk Esterase: x / RBC: x / WBC x   Sq Epi: x / Non Sq Epi: x / Bacteria: x      CAPILLARY BLOOD GLUCOSE            Radiology: Reviewed.     ORT Score -   Family Hx of substance abuse	Female	      Male  Alcohol 	                                           1                     3  Illegal drugs	                                   2                     3  Rx drugs                                           4 	                  4  Personal Hx of substance abuse		  Alcohol 	                                          3	                  3  Illegal drugs                                     4	                  4  Rx drugs                                            5 	                  5  Age between 16- 45 years	           1                     1  hx preadolescent sexual abuse	   3 	                  0  Psychological disease		  ADD, OCD, bipolar, schizophrenia   2	          2  Depression                                           1 	          1  Total: 0    a score of 3 or lower indicates low risk for opioid abuse		  a score of 4-7 indicates moderate risk for opioid abuse		  a score of 8 or higher indicates high risk for opioid abuse  	  4AT (Assessment test for delirium & cognitive impairment)  _________________________________________________________  [1] ALERTNESS  This includes patients who may be markedly drowsy (eg. difficult to rouse and/or obviously sleepy  during assessment) or agitated/hyperactive. Observe the patient. If asleep, attempt to wake with  speech or gentle touch on shoulder. Ask the patient to state their name and address to assist rating.  Normal (fully alert, but not agitated, throughout assessment) 0  Mild sleepiness for <10 seconds after waking, then normal 0  Clearly abnormal 4    [2] AMT4  Age, date of birth, place (name of the hospital or building), current year.  No mistakes 0  1 mistake 1  2 or more mistakes/untestable 2    [3] ATTENTION  Ask the patient: “Please tell me the months of the year in backwards order, starting at December.”  To assist initial understanding one prompt of “what is the month before December?” is permitted.  Months of the year backwards Achieves 7 months or more correctly 0  Starts but scores <7 months / refuses to start 1   Untestable (cannot start because unwell, drowsy, inattentive) 2    [4] ACUTE CHANGE OR FLUCTUATING COURSE  Evidence of significant change or fluctuation in: alertness, cognition, other mental function  (eg. paranoia, hallucinations) arising over the last 2 weeks and still evident in last 24hrs  No 0  Yes 4    4 or above: possible delirium +/- cognitive impairment  1-3: possible cognitive impairment  0: delirium or severe cognitive impairment unlikely (but delirium still possible if [4] information incomplete)    4AT SCORE: 0    REVIEW OF SYSTEMS:  CONSTITUTIONAL: No fever or fatigue  HEENT:  No difficulty hearing, no change in vision  NECK: No pain or stiffness  RESPIRATORY: No cough, wheezing, chills or hemoptysis; No shortness of breath  CARDIOVASCULAR: No chest pain, palpitations, dizziness, or leg swelling  GASTROINTESTINAL: No loss of appetite, decreased PO intake. No abdominal or epigastric pain. No nausea, vomiting; No diarrhea or constipation.   GENITOURINARY: No dysuria, frequency, hematuria, retention or incontinence  MUSCULOSKELETAL: No joint pain or swelling; No muscle, back, or extremity pain, no upper or lower motor strength weakness, no saddle anesthesia, bowel/bladder incontinence, no falls   NEURO: No headaches, No numbness/tingling b/l LE, No weakness  ENDOCRINE: No polyuria, polydipsia, heat or cold intolerance; No hair loss  PSYCHIATRIC: No depression, anxiety or difficulty sleeping    PHYSICAL EXAM:  GENERAL:  Alert & Oriented X4, cooperative, NAD, Good concentration. Speech is clear.   RESPIRATORY: Respirations even and unlabored. Clear to auscultation bilaterally; No rales, rhonchi, wheezing, or rubs  CARDIOVASCULAR: Normal S1/S2, regular rate and rhythm; No murmurs, rubs, or gallops. No JVD.   GASTROINTESTINAL:  Soft, Nontender, Nondistended; Bowel sounds present  PERIPHERAL VASCULAR:  Extremities warm without edema. 2+ Peripheral Pulses, No cyanosis, No calf tenderness  MUSCULOSKELETAL: Motor Strength 5/5 B/L upper and lower extremities; moves all extremities equally against gravity; ROM intact; negative SLR; No tenderness on palpation of all joints.   SKIN: Warm, dry, intact. No rashes, lesions, scars or wounds.     Risk factors associated with adverse outcomes related to opioid treatment  [ ]  Concurrent benzodiazepine use  [ ]  History/ Active substance use or alcohol use disorder  [ ] Psychiatric co-morbidity  [ ] Sleep apnea  [ ] COPD  [ ] BMI> 35  [ ] Liver dysfunction  [ ] Renal dysfunction  [ ] CHF  [ ] Smoker  [ ]  Age > 60 years    [ ]  [place]  Reviewed and Copied to Chart. See below.    Plan of care and goal oriented pain management treatment options were discussed with patient and /or primary care giver; all questions and concerns were addressed and care was aligned with patient's wishes.    Educated patient on goal oriented pain management treatment options

## 2023-08-18 NOTE — ED ADULT NURSE NOTE - NSFALLUNIVINTERV_ED_ALL_ED
Bed/Stretcher in lowest position, wheels locked, appropriate side rails in place/Call bell, personal items and telephone in reach/Instruct patient to call for assistance before getting out of bed/chair/stretcher/Non-slip footwear applied when patient is off stretcher/[place] to call system/Physically safe environment - no spills, clutter or unnecessary equipment/Purposeful proactive rounding/Room/bathroom lighting operational, light cord in reach

## 2023-08-18 NOTE — H&P ADULT - PROBLEM SELECTOR PLAN 2
Has reportedly been using more oxycodone at home than usual, complaining of increased lower back pain  s/p 2 U PRBC  s/p heparin + brilinta in ED  s/p toradol, dilaudid, diazepam, benadryl IV 50 + benadyl oral 25 mg  -home oxycodone 30 mg TID PRN  -given chronic opiate use pt likely has component of hyperalgesia, but he does exhibit signs of drug seeking behavior  -Pt complaining of diffuse pain, but appeared comfortable on exam, able to move all extremities spontaneously, able to have full ROM, able to sit up and bend with no difficulty, appeared slightly sleepy but was smiling and laughing. He was specifically requesting 2 mg dilaudid IVP + IV benadryl 50 mg, and was counting doses of medications administered, sugg  -given 2 mg total IV dilaudid this morning      -pain mgmt consult appreciated  -cont dilaudid 1 mg for mod pain q4h, 2 mg for severe pain q4h Has reportedly been using more oxycodone at home than usual, complaining of increased lower back pain  s/p 2 U PRBC  s/p toradol, dilaudid, diazepam, benadryl IV 50 + benadyl oral 25 mg  -home oxycodone 30 mg TID PRN  -given chronic opiate use pt likely has component of hyperalgesia, but he does exhibit signs of drug seeking behavior  -Pt complaining of diffuse pain, but appeared comfortable on exam, able to move all extremities spontaneously, able to have full ROM, able to sit up and bend with no difficulty, appeared slightly sleepy but was smiling and laughing. He was specifically requesting 2 mg dilaudid IVP + IV benadryl 50 mg, and was counting doses of medications administered, sugg  -given 2 mg total IV dilaudid this morning      -pain mgmt consult appreciated  -cont dilaudid 1 mg for mod pain q4h, 2 mg for severe pain q4h Has reportedly been using more oxycodone at home than usual, complaining of increased lower back pain  s/p 2 U PRBC  s/p toradol, dilaudid, diazepam, benadryl IV 50 + benadyl oral 25 mg  -home oxycodone 30 mg TID PRN  -given chronic opiate use pt likely has component of hyperalgesia, but he does exhibit signs of drug seeking behavior  -Pt complaining of diffuse pain, but appeared comfortable on exam, able to move all extremities spontaneously, able to have full ROM, able to sit up and bend with no difficulty, appeared slightly sleepy but was smiling and laughing. He was specifically requesting 2 mg dilaudid IVP + IV benadryl 50 mg, and was counting doses of medications administered, despite appearing comfortable, suggesting drug seeking behavior    -pain mgmt consult appreciated  -cont dilaudid 1 mg for mod pain q4h, 2 mg for severe pain q4h pending pain management recs  -supplemental O2, keep O2 sat >94%  -LR at 150 cc/hr

## 2023-08-18 NOTE — H&P ADULT - NSHPPHYSICALEXAM_GEN_ALL_CORE
T(C): 36.7 (08-18-23 @ 11:04), Max: 37.2 (08-17-23 @ 23:01)  HR: 78 (08-18-23 @ 11:04) (74 - 90)  BP: 116/68 (08-18-23 @ 11:04) (116/68 - 131/79)  RR: 16 (08-18-23 @ 11:04) (16 - 16)  SpO2: 91% (08-18-23 @ 11:04) (91% - 94%)    CONSTITUTIONAL: Well groomed, no apparent distress, appears very comfortable, smiling, laughing    HEENT: normotramuatic, acephalic, PERRL and symmetric, EOMI, No conjunctival or scleral injection, non-icteric. Oral mucosa with moist membranes. No external nasal lesions; nasal mucosa not inflamed; no pharyngeal injection or exudates.               Neck: supple, symmetric and without tracheal deviation    RESPIRATORY: No respiratory distress, no use of accessory muscles; CTA b/l, no wheezes, rales or rhonchi    CARDIOVASCULAR: RRRR, +S1S2, no murmurs, no rubs, no gallops; no JVD; no pitting edema  	Vascular: carotid pulse palpable, radial pulse palpable    GASTROINTESTINAL: +BS, Soft, non tender, non distended, no rebound, no guarding; No palpable masses    MUSCULOSKELETAL: No digital clubbing or cyanosis; examination of the (head/neck, spine/ribs/pelvis, RUE, LUE, RLE, LLE) without misalignment, no spinal tenderness  Ankles swollen bilaterally, ROM intact, no calf tenderness, jeanne's neg    SKIN: No rashes or ulcers noted; no subcutaneous nodules or induration palpable    NEUROLOGIC: sensation intact in upper and lower extremities b/l to light touch; strength appropriate    PSYCHIATRIC: Appropriate insight/judgment; A+O x 3, mood and affect appropriate, recent/remote memory intact    LYMPHATIC: No cervical LAD or tenderness    GENITOURINARY: No suprapubic tenderness, +CVA tenderness b/l

## 2023-08-18 NOTE — H&P ADULT - HISTORY OF PRESENT ILLNESS
Pt is a 44 yo M w hx sickle cell anemia (baseline Hb 5-6), multiple admissions for pain crisis most recently 5/2023, gout, presenting from o/p hematologist Dr. Jordan's office for anemia and pain. Pt says that he has been having diffuse pain for the past few days with increased use of his home oxycodone 30 mg, which he has been taking q3-4h instead of q8h. He said he had some headache and nausea as well. When he presented to Dr. Jordan's office he was found to have Hb 5, and was sent to ED for further evaluation.    On current evaluation, pt complains of lower back pain but says headache has resolved, and that the nausea is very mild and has greatly improved. He denies SOB, chest pain, dysuria, diarrhea.    ED course:  Hb 5  s/p 2 U PRBC  s/p diazepam, diphenhydramine, dilaudid, toradol

## 2023-08-18 NOTE — ED ADULT NURSE NOTE - OBJECTIVE STATEMENT
pt is here for body ache.  pt stated that body ache and nausea, h/x sickle cell, denied chest pain or abdominal pain, a/ox4, ambulatory, skin intact,

## 2023-08-18 NOTE — CONSULT NOTE ADULT - SUBJECTIVE AND OBJECTIVE BOX
CHIEF COMPLAINT  Anemia    HISTORY OF PRESENT ILLNESS  TOLU VARGAS is a 42y Male who presents with a chief complaint of anemia    Patient was admitted on August 18th after presenting to the Emergency Department with worsening diffuse pain. He was admitted for further evaluation and management.    Patient follows with Dr. Shirley Jordan for sickle cell disease. His chronic hemoglobin is around 5-6, and he has had multiple episodes of pain crises. He takes folic acid at home; previously unable to tolerate hydroxyurea [priapism] and voxelotor [leg swelling]. He was last seen in clinic on August 17th, 2023.     PAST MEDICAL AND SURGICAL HISTORY  Gout  Sickle Cell Disease    FAMILY HISTORY  Sickle Cell    SOCIAL HISTORY  Denies tobacco use    REVIEW OF SYSTEMS  A complete review of systems was performed; negative except per HPI    PHYSICAL EXAM  Vital Signs Last 24 Hrs  T(C): 36.6 (18 Aug 2023 08:00), Max: 37.2 (17 Aug 2023 23:01)  T(F): 97.9 (18 Aug 2023 08:00), Max: 98.9 (17 Aug 2023 23:01)  HR: 74 (18 Aug 2023 08:00) (74 - 90)  BP: 124/75 (18 Aug 2023 08:00) (119/78 - 131/79)  BP(mean): --  RR: 16 (18 Aug 2023 08:00) (16 - 16)  SpO2: 94% (18 Aug 2023 08:00) (91% - 94%)    Parameters below as of 18 Aug 2023 08:00  Patient On (Oxygen Delivery Method): room air    Constitutional: alert, awake, in no acute distress  Eyes: PERRL, EOMI  HEENT: normocephalic, atraumatic  Neck: supple, non-tender  Cardiovascular: normal perfusion, no peripheral edema  Respiratory: normal respiratory efforts; no increased use of accessory muscles  Gastrointestinal: soft, non-tender  Musculoskeletal: normal range of motion, no deformities noted  Neurological: alert, CN II to XI grossly intact  Skin: warm, dry    LABORATORY DATA                                   5.0    10.50 )-----------( 237      ( 18 Aug 2023 01:00 )             13.8     08-18    133<L>  |  106  |  23<H>  ----------------------------<  94  4.6   |  22  |  1.35<H>    Ca    8.6      18 Aug 2023 01:00    TPro  7.2  /  Alb  3.5  /  TBili  7.5<H>  /  DBili  x   /  AST  125<H>  /  ALT  51  /  AlkPhos  115  08-18

## 2023-08-18 NOTE — CONSULT NOTE ADULT - ASSESSMENT
TOLU VARGAS is a 42y Male who presents with a chief complaint of sickle cell crisis    Sickle Cell Anemia  - Patient follows with Dr. Shirley Jordan.  - Patient with baseline hemoglobin in the 5-6 range. Baseline bilirubin is in the 6 range from chronic ongoing sickle cell hemolysis.  - Monitor CBC. Hemoglobin today is 5. Recommend 1 unit of blood transfusion. Maintain hemoglobin above 5 or transfuse if symptomatic.   - IVF and IV opiate pain control as needed.  - Continue folic acid.   - Pain management consultation.    Will continue to follow.    Bala Pan MD  Hematology/Oncology  O: [phone]
Confidential Drug Utilization Report  Search Terms: Alex Downey, 1980Search Date: 08/18/2023 16:49:21 PM  The Drug Utilization Report below displays all of the controlled substance prescriptions, if any, that your patient has filled in the last twelve months. The information displayed on this report is compiled from pharmacy submissions to the Department, and accurately reflects the information as submitted by the pharmacies.    This report was requested by: Beata Alfaro | Reference #: [id number]    You have not added a NANCY number. Keeping your NANCY number(s) up to date on the My NANCY # tab will enable the separation of your prescriptions from others in the search results.    Practitioner Count: 2  Pharmacy Count: 1  Current Opioid Prescriptions: 1  Current Benzodiazepine Prescriptions: 0  Current Stimulant Prescriptions: 0      Patient Demographic Information (PDI)       PDI	First Name	Last Name	Birth Date	Gender	Street Address	City	State	Zip Code  DAVID Downey	1980	Male	[address]	[facility]	[address]    Prescription Information      PDI Filter:    PDI	Current Rx	Drug Type	Rx Written	Rx Dispensed	Drug	Quantity	Days Supply	Prescriber Name	Prescriber NANCY #	Payment Method	Dispenser  A	Y	O	07/20/2023	07/21/2023	oxycodone hcl (ir) 30 mg tab	180	30	Laney Boggs	[id number]	Medicaid	[place]   A	N	O	06/22/2023	06/23/2023	oxycodone hcl (ir) 30 mg tab	180	30	Jordan, Shirley SOLITARIO	[id number]	Medicaid	[place]   A	N	O	04/20/2023	04/25/2023	oxycodone hcl (ir) 30 mg tab	180	30	Jordan, Shirley SOLITARIO	[id number]	Medicaid	[place]   A	N	O	03/23/2023	03/27/2023	oxycodone hcl (ir) 30 mg tab	180	30	Jordan, Shirley SOLITARIO	[id number]	Insurance	[place]   A	N	O	02/23/2023	02/27/2023	oxycodone hcl (ir) 30 mg tab	180	30	Jordan, Shirley SOLITARIO	[id number]	Insurance	[place]   A	N	O	12/29/2022	01/02/2023	oxycodone hcl (ir) 30 mg tab	180	30	Jag Ayala	[id number]	Insurance	[place]   A	N	O	12/01/2022	12/03/2022	oxycodone hcl (ir) 30 mg tab	180	30	Jordan, Shirley SOLITARIO	[id number]	Insurance	[place]   A	N	O	11/03/2022	11/04/2022	oxycodone hcl (ir) 30 mg tab	180	30	Jordan, Shirley SOLITARIO	[id number]	Insurance	[place]   A	N	O	10/06/2022	10/10/2022	oxycodone hcl (ir) 30 mg tab	180	30	Jordan, Shirley SOLITARIO	[id number]	Insurance	[place]   A	N	O	09/08/2022	09/09/2022	oxycodone hcl (ir) 30 mg tab	180	30	Jordan, Shirley SOLITARIO	[id number]	Insurance	[place]     * - Details of Drug Type : O = Opioid, B = Benzodiazepine, S = Stimulant    * - Drugs marked with an asterisk are compound drugs. If the compound drug is made up of more than one controlled substance, then each controlled substance will be a separate row in the table.     
Confidential Drug Utilization Report  Search Terms: Alex Downey, 1980Search Date: 08/18/2023 17:25:47 PM  The Drug Utilization Report below displays all of the controlled substance prescriptions, if any, that your patient has filled in the last twelve months. The information displayed on this report is compiled from pharmacy submissions to the Department, and accurately reflects the information as submitted by the pharmacies.    This report was requested by: Beata Alfaro | Reference #: [id number]    You have not added a NANCY number. Keeping your NANCY number(s) up to date on the My NANCY # tab will enable the separation of your prescriptions from others in the search results.    Practitioner Count: 2  Pharmacy Count: 1  Current Opioid Prescriptions: 1  Current Benzodiazepine Prescriptions: 0  Current Stimulant Prescriptions: 0      Patient Demographic Information (PDI)       PDI	First Name	Last Name	Birth Date	Gender	Street Address	City	State	Zip Code  DAVID Downey	1980	Male	[address]	[facility]	[address]    Prescription Information      PDI Filter:    PDI	Current Rx	Drug Type	Rx Written	Rx Dispensed	Drug	Quantity	Days Supply	Prescriber Name	Prescriber NANCY #	Payment Method	Dispenser  A	Y	O	07/20/2023	07/21/2023	oxycodone hcl (ir) 30 mg tab	180	30	Laney Boggs	[id number]	Medicaid	[place]   A	N	O	06/22/2023	06/23/2023	oxycodone hcl (ir) 30 mg tab	180	30	Jordan, Shirley SOLITARIO	[id number]	Medicaid	[place]   A	N	O	04/20/2023	04/25/2023	oxycodone hcl (ir) 30 mg tab	180	30	Jordan, Shirley SOLITARIO	[id number]	Medicaid	[place]   A	N	O	03/23/2023	03/27/2023	oxycodone hcl (ir) 30 mg tab	180	30	Jordan, Shirley SOLITARIO	[id number]	Insurance	[place]   A	N	O	02/23/2023	02/27/2023	oxycodone hcl (ir) 30 mg tab	180	30	Jordan, Shirley SOLITARIO	[id number]	Insurance	[place]   A	N	O	12/29/2022	01/02/2023	oxycodone hcl (ir) 30 mg tab	180	30	Jag Ayala	[id number]	Insurance	[place]   A	N	O	12/01/2022	12/03/2022	oxycodone hcl (ir) 30 mg tab	180	30	Jordan, Shirley SOLITARIO	[id number]	Insurance	[place]   A	N	O	11/03/2022	11/04/2022	oxycodone hcl (ir) 30 mg tab	180	30	Jordan, Shirley SOLITARIO	[id number]	Insurance	[place]   A	N	O	10/06/2022	10/10/2022	oxycodone hcl (ir) 30 mg tab	180	30	Jordan, Shirley SOLITARIO	[id number]	Insurance	[place]   A	N	O	09/08/2022	09/09/2022	oxycodone hcl (ir) 30 mg tab	180	30	Jordan, Shirley SOLITARIO	[id number]	Insurance	[place]     * - Details of Drug Type : O = Opioid, B = Benzodiazepine, S = Stimulant    * - Drugs marked with an asterisk are compound drugs. If the compound drug is made up of more than one controlled substance, then each controlled substance will be a separate row in the table.

## 2023-08-18 NOTE — H&P ADULT - ATTENDING COMMENTS
Patient was seen and examined in bedside in ER. Stable. Full note to follow. Patient was seen and examined in bedside. History as above. Frustrated about pain medications not being given. Refuses blood draws or peripheral IV line. Wants to use Chemo Port.     ICU Vital Signs Last 24 Hrs  T(C): 36.7 (18 Aug 2023 11:04), Max: 37.2 (17 Aug 2023 23:01)  T(F): 98.1 (18 Aug 2023 11:04), Max: 98.9 (17 Aug 2023 23:01)  HR: 78 (18 Aug 2023 11:04) (74 - 90)  BP: 116/68 (18 Aug 2023 11:04) (116/68 - 131/79)  BP(mean): --  ABP: --  ABP(mean): --  RR: 16 (18 Aug 2023 11:04) (16 - 16)  SpO2: 91% (18 Aug 2023 11:04) (91% - 94%)    O2 Parameters below as of 18 Aug 2023 11:04  Patient On (Oxygen Delivery Method): room air    P/E:  NAD  AAOx3, no focal deficit   CTABL , chest wall cath in place   S1S2 WNL  Abd soft, non tender, BS present   BLLE no edema or calf tenderness   Diffuse tenderness in hip and LE, but not reproducible     labs noted     A/P:  Acute sickle cell pain crisis  Sickle call anemia   MATA  Gout     Plan:   Started on IV pain meds, called pain management consult   Cont IVF LR @150ml/hr  Follow up post transfusion CBC (s/p 2U PRBC)  Obtain LDH and Retic count to r/o aplastic or hemolysis crisis  Repeat BMP  NC O2 to keep sat >94%  Obtain daily CBC, BMP, LDH  Cont home meds   Discussed the plan with MAR

## 2023-08-18 NOTE — CONSULT NOTE ADULT - PROBLEM SELECTOR RECOMMENDATION 9
Pt with acute exacerbation of chronic back pain and generalized joint pain which is somatic in nature due to sickle cell crisis. H/H 5/13.8. + MATA. Pt is opioid dependent. On oxycodone 30mg PO 6x/day at home.    Opioid pain recommendations   - Start Dilaudid 2mg IV q3 hours PRN severe pain. Monitor for sedation/ respiratory depression.   - Titrate to home regimen as sickle cell crisis resolves. Pt on Oxycodone 30 mg po q4h PRN for severe pain.  Non-opioid pain recommendations   - Avoid NSAIDs.   - Avoid Tylenol. Pt refuses  - Lidoderm 4% patch daily.   Bowel Regimen  - Continue Miralax 17G PO daily  - Continue Senna 2 tablets at bedtime for constipation  Mild pain   - Non-pharmacological pain treatment recommendations  - Warm/ Cool packs PRN   - Repositioning, imagery, relaxation, distraction.  - OOB if no contraindications   Recommendations discussed with primary team and RN.

## 2023-08-18 NOTE — CHART NOTE - NSCHARTNOTEFT_GEN_A_CORE
Informed by RN that pt is complaining of CP. On assessment, pt appeared very comfortable, EKG was done, showing NSR. He said that the chest pain had resolved and that he felt a twinge earlier, but that it is now gone. He continued to refuse to have labs drawn via peripheral venipuncture, says that he has no veins, and that is why the port was placed. Was explained that there is a risk of infection, and asked if we could try once using peripheral veins, but he continued to refuse and said that he would rather have no blood drawn at all.  Given that CP has resolved, no current need for stat trops or cardiac enzymes.  Given that pt is insistent that only the port be accessed, to minimize the number of times port is accessed, can draw labs in the a.m., as risk of infection is high if port is accessed multiple times. Informed by RN that pt is complaining of CP. On assessment, pt appeared very comfortable, EKG was done, showing NSR. He said that the chest pain had resolved and that he felt a twinge earlier, but that it is now gone. He continued to refuse to have labs drawn via peripheral venipuncture, says that he has no veins, and that is why the port was placed. Was explained that there is a risk of infection, and asked if we could try once using peripheral veins, but he continued to refuse and said that he would rather have no blood drawn at all.  Given that CP has resolved, no current need for stat trops or cardiac enzymes.  Given that pt is insistent that only the port be accessed, to minimize the number of times port is accessed, can draw labs in the a.m., as risk of infection is high if port is accessed multiple times.  His baseline HB is 5.0, which is what he was at on admission, will f/u post transfusion CBC in a.m.

## 2023-08-18 NOTE — CONSULT NOTE ADULT - SUBJECTIVE AND OBJECTIVE BOX
Source of information: TOLU VARGAS, Chart review  Patient language: English  : n/a    HPI:  Pt is a 44 yo M w hx sickle cell anemia (baseline Hb 5-6), multiple admissions for pain crisis most recently 5/2023, gout, presenting from o/p hematologist Dr. Jordan's office for anemia and pain. Pt says that he has been having diffuse pain for the past few days with increased use of his home oxycodone 30 mg, which he has been taking q3-4h instead of q8h. He said he had some headache and nausea as well. When he presented to Dr. Jordan's office he was found to have Hb 5, and was sent to ED for further evaluation.    On current evaluation, pt complains of lower back pain but says headache has resolved, and that the nausea is very mild and has greatly improved. He denies SOB, chest pain, dysuria, diarrhea.    ED course:  Hb 5  s/p 2 U PRBC  s/p diazepam, diphenhydramine, dilaudid, toradol     (18 Aug 2023 11:48)      Patient is a 43y old  Male who presents with a chief complaint of sickle cell crisis (18 Aug 2023 11:48)  . Pt is admitted for ..., being treated with .... Pain consulted for .... Pt seen and examined at bedside. Reports pain score ***  SCALE USED: (1-10 VNRS). Pt describes pain as .... radiating to... alleviated by pain medication... exacerbated by movement... Pt tolerating PO diet. Denies lethargy, nausea, vomiting, constipation, itchiness. Reports last BM ***. Patient stated goal for pain control: to be able to take deep breaths, get out of bed to chair and ambulate with tolerable pain control. Pt denies taking medications for pain at home.     PAST MEDICAL & SURGICAL HISTORY:  Sickle cell anemia      Gout      History of cholecystectomy          FAMILY HISTORY:  Family history of sickle cell trait (Father, Mother)        Social History:  not working (18 Aug 2023 11:48)   [ ] Denies ETOH use, illicit drug use and smoking    Allergies    ceftriaxone (Anaphylaxis)  penicillin (Pruritus)  hydroxyurea (Other)  piperacillin-tazobactam (Urticaria)    Intolerances        MEDICATIONS  (STANDING):  allopurinol 100 milliGRAM(s) Oral daily  folic acid 1 milliGRAM(s) Oral every 24 hours  heparin   Injectable 5000 Unit(s) SubCutaneous every 12 hours  lactated ringers. 1000 milliLiter(s) (150 mL/Hr) IV Continuous <Continuous>  lidocaine   4% Patch 1 Patch Transdermal daily  naloxone Injectable 0.4 milliGRAM(s) IV Push once  polyethylene glycol 3350 17 Gram(s) Oral daily  senna 2 Tablet(s) Oral at bedtime    MEDICATIONS  (PRN):  acetaminophen     Tablet .. 650 milliGRAM(s) Oral every 6 hours PRN Temp greater or equal to 38C (100.4F), Mild Pain (1 - 3)  aluminum hydroxide/magnesium hydroxide/simethicone Suspension 30 milliLiter(s) Oral every 4 hours PRN Dyspepsia  bisacodyl 5 milliGRAM(s) Oral daily PRN Constipation  HYDROmorphone  Injectable 1 milliGRAM(s) IV Push every 4 hours PRN Moderate Pain (4 - 6)  HYDROmorphone  Injectable 2 milliGRAM(s) IV Push every 4 hours PRN Severe Pain (7 - 10)  HYDROmorphone  Injectable 1 milliGRAM(s) IV Push once PRN Severe Pain (7 - 10)      Vital Signs Last 24 Hrs  T(C): 36.7 (18 Aug 2023 11:04), Max: 37.2 (17 Aug 2023 23:01)  T(F): 98.1 (18 Aug 2023 11:04), Max: 98.9 (17 Aug 2023 23:01)  HR: 78 (18 Aug 2023 11:04) (74 - 90)  BP: 116/68 (18 Aug 2023 11:04) (116/68 - 131/79)  BP(mean): --  RR: 16 (18 Aug 2023 11:04) (16 - 16)  SpO2: 91% (18 Aug 2023 11:04) (91% - 94%)    Parameters below as of 18 Aug 2023 11:04  Patient On (Oxygen Delivery Method): room air        LABS: Reviewed.                          5.0    10.50 )-----------( 237      ( 18 Aug 2023 01:00 )             13.8     08-18    133<L>  |  106  |  23<H>  ----------------------------<  94  4.6   |  22  |  1.35<H>    Ca    8.6      18 Aug 2023 01:00    TPro  7.2  /  Alb  3.5  /  TBili  7.5<H>  /  DBili  x   /  AST  125<H>  /  ALT  51  /  AlkPhos  115  08-18    PT/INR - ( 18 Aug 2023 01:00 )   PT: 12.4 sec;   INR: 1.09 ratio         PTT - ( 18 Aug 2023 01:00 )  PTT:43.5 sec  LIVER FUNCTIONS - ( 18 Aug 2023 01:00 )  Alb: 3.5 g/dL / Pro: 7.2 g/dL / ALK PHOS: 115 U/L / ALT: 51 U/L DA / AST: 125 U/L / GGT: x           Urinalysis Basic - ( 18 Aug 2023 01:00 )    Color: x / Appearance: x / SG: x / pH: x  Gluc: 94 mg/dL / Ketone: x  / Bili: x / Urobili: x   Blood: x / Protein: x / Nitrite: x   Leuk Esterase: x / RBC: x / WBC x   Sq Epi: x / Non Sq Epi: x / Bacteria: x      CAPILLARY BLOOD GLUCOSE            Radiology: Reviewed.     ORT Score -   Family Hx of substance abuse	Female	      Male  Alcohol 	                                           1                     3  Illegal drugs	                                   2                     3  Rx drugs                                           4 	                  4  Personal Hx of substance abuse		  Alcohol 	                                          3	                  3  Illegal drugs                                     4	                  4  Rx drugs                                            5 	                  5  Age between 16- 45 years	           1                     1  hx preadolescent sexual abuse	   3 	                  0  Psychological disease		  ADD, OCD, bipolar, schizophrenia   2	          2  Depression                                           1 	          1  Total: 0    a score of 3 or lower indicates low risk for opioid abuse		  a score of 4-7 indicates moderate risk for opioid abuse		  a score of 8 or higher indicates high risk for opioid abuse  	  4AT (Assessment test for delirium & cognitive impairment)  _________________________________________________________  [1] ALERTNESS  This includes patients who may be markedly drowsy (eg. difficult to rouse and/or obviously sleepy  during assessment) or agitated/hyperactive. Observe the patient. If asleep, attempt to wake with  speech or gentle touch on shoulder. Ask the patient to state their name and address to assist rating.  Normal (fully alert, but not agitated, throughout assessment) 0  Mild sleepiness for <10 seconds after waking, then normal 0  Clearly abnormal 4    [2] AMT4  Age, date of birth, place (name of the hospital or building), current year.  No mistakes 0  1 mistake 1  2 or more mistakes/untestable 2    [3] ATTENTION  Ask the patient: “Please tell me the months of the year in backwards order, starting at December.”  To assist initial understanding one prompt of “what is the month before December?” is permitted.  Months of the year backwards Achieves 7 months or more correctly 0  Starts but scores <7 months / refuses to start 1   Untestable (cannot start because unwell, drowsy, inattentive) 2    [4] ACUTE CHANGE OR FLUCTUATING COURSE  Evidence of significant change or fluctuation in: alertness, cognition, other mental function  (eg. paranoia, hallucinations) arising over the last 2 weeks and still evident in last 24hrs  No 0  Yes 4    4 or above: possible delirium +/- cognitive impairment  1-3: possible cognitive impairment  0: delirium or severe cognitive impairment unlikely (but delirium still possible if [4] information incomplete)    4AT SCORE: 0    REVIEW OF SYSTEMS:  CONSTITUTIONAL: No fever or fatigue  HEENT:  No difficulty hearing, no change in vision  NECK: No pain or stiffness  RESPIRATORY: No cough, wheezing, chills or hemoptysis; No shortness of breath  CARDIOVASCULAR: No chest pain, palpitations, dizziness, or leg swelling  GASTROINTESTINAL: No loss of appetite, decreased PO intake. No abdominal or epigastric pain. No nausea, vomiting; No diarrhea or constipation.   GENITOURINARY: No dysuria, frequency, hematuria, retention or incontinence  MUSCULOSKELETAL: No joint pain or swelling; No muscle, back, or extremity pain, no upper or lower motor strength weakness, no saddle anesthesia, bowel/bladder incontinence, no falls   NEURO: No headaches, No numbness/tingling b/l LE, No weakness  ENDOCRINE: No polyuria, polydipsia, heat or cold intolerance; No hair loss  PSYCHIATRIC: No depression, anxiety or difficulty sleeping    PHYSICAL EXAM:  GENERAL:  Alert & Oriented X4, cooperative, NAD, Good concentration. Speech is clear.   RESPIRATORY: Respirations even and unlabored. Clear to auscultation bilaterally; No rales, rhonchi, wheezing, or rubs  CARDIOVASCULAR: Normal S1/S2, regular rate and rhythm; No murmurs, rubs, or gallops. No JVD.   GASTROINTESTINAL:  Soft, Nontender, Nondistended; Bowel sounds present  PERIPHERAL VASCULAR:  Extremities warm without edema. 2+ Peripheral Pulses, No cyanosis, No calf tenderness  MUSCULOSKELETAL: Motor Strength 5/5 B/L upper and lower extremities; moves all extremities equally against gravity; ROM intact; negative SLR; No tenderness on palpation of all joints.   SKIN: Warm, dry, intact. No rashes, lesions, scars or wounds.     Risk factors associated with adverse outcomes related to opioid treatment  [ ]  Concurrent benzodiazepine use  [ ]  History/ Active substance use or alcohol use disorder  [ ] Psychiatric co-morbidity  [ ] Sleep apnea  [ ] COPD  [ ] BMI> 35  [ ] Liver dysfunction  [ ] Renal dysfunction  [ ] CHF  [ ] Smoker  [ ]  Age > 60 years    [ ]  [place]  Reviewed and Copied to Chart. See below.    Plan of care and goal oriented pain management treatment options were discussed with patient and /or primary care giver; all questions and concerns were addressed and care was aligned with patient's wishes.    Educated patient on goal oriented pain management treatment options        Source of information: TOLU VARGAS, Chart review  Patient language: English  : n/a    HPI:  Pt is a 44 yo M w hx sickle cell anemia (baseline Hb 5-6), multiple admissions for pain crisis most recently 5/2023, gout, presenting from o/p hematologist Dr. Jordan's office for anemia and pain. Pt says that he has been having diffuse pain for the past few days with increased use of his home oxycodone 30 mg, which he has been taking q3-4h instead of q8h. He said he had some headache and nausea as well. When he presented to Dr. Jordan's office he was found to have Hb 5, and was sent to ED for further evaluation.    On current evaluation, pt complains of lower back pain but says headache has resolved, and that the nausea is very mild and has greatly improved. He denies SOB, chest pain, dysuria, diarrhea.    ED course:  Hb 5  s/p 2 U PRBC  s/p diazepam, diphenhydramine, Dilaudid, Toradol     (18 Aug 2023 11:48)    Patient is a 42y old  Male with PMH sickle cell disease, gout who presents with a chief complaint of Generalized pain. Hx with sickle cell crisis. Retic count n/a. H/H 5/13.8. Received 2 unit PRBC, in ED. Pain consulted for sickle cell pain on 8/18. (Last seen by pain management during prior admission on 5/2023). Pt seen and examined at bedside this afternoon in room. Pt found Sitting in bed, watching phone, no acute distress noted. Pt reports lumbar back pain, rib pain b/l, arm pain b/d. Denies any precipitating pain factors. Pt with chronic pain and is opioid dependent. Per istop review, on oxycodone 30mg PO 6 times/ day, last prescription filled on 7/21/2023, prescribed by MD Jordan (Hem-Onc). Currently rating lumbar back pain score 9/10 and not tolerable SCALE USED: (1-10 VNRS). Pt reports has being medicated with Dilaudid IVP in ED with without relief. Pt describes pain as sharp, non- radiating, alleviated by IV pain medication exacerbated by movement. Pt tolerating PO diet. Denies lethargy, chest pain, SOB, vomiting, constipation. Nausea has improved. Reports last BM yesterday 8/16. Patient stated goal for pain control: to be able to take deep breaths, get out of bed to chair and ambulate with tolerable pain control. Pt reports ambulating to bathroom without assistance    PAST MEDICAL & SURGICAL HISTORY:  Sickle cell anemia    Gout    History of cholecystectomy    FAMILY HISTORY:  Family history of sickle cell trait (Father, Mother)    Social History:  not working (18 Aug 2023 11:48)   [x] Denies ETOH use, illicit drug use and smoking    Allergies    ceftriaxone (Anaphylaxis)  penicillin (Pruritus)  hydroxyurea (Other)  piperacillin-tazobactam (Urticaria)    Intolerances    MEDICATIONS  (STANDING):  allopurinol 100 milliGRAM(s) Oral daily  folic acid 1 milliGRAM(s) Oral every 24 hours  heparin   Injectable 5000 Unit(s) SubCutaneous every 12 hours  lactated ringers. 1000 milliLiter(s) (150 mL/Hr) IV Continuous <Continuous>  lidocaine   4% Patch 1 Patch Transdermal daily  naloxone Injectable 0.4 milliGRAM(s) IV Push once  polyethylene glycol 3350 17 Gram(s) Oral daily  senna 2 Tablet(s) Oral at bedtime    MEDICATIONS  (PRN):  acetaminophen     Tablet .. 650 milliGRAM(s) Oral every 6 hours PRN Temp greater or equal to 38C (100.4F), Mild Pain (1 - 3)  aluminum hydroxide/magnesium hydroxide/simethicone Suspension 30 milliLiter(s) Oral every 4 hours PRN Dyspepsia  bisacodyl 5 milliGRAM(s) Oral daily PRN Constipation  HYDROmorphone  Injectable 1 milliGRAM(s) IV Push every 4 hours PRN Moderate Pain (4 - 6)  HYDROmorphone  Injectable 2 milliGRAM(s) IV Push every 4 hours PRN Severe Pain (7 - 10)  HYDROmorphone  Injectable 1 milliGRAM(s) IV Push once PRN Severe Pain (7 - 10)    Vital Signs Last 24 Hrs  T(C): 36.7 (18 Aug 2023 11:04), Max: 37.2 (17 Aug 2023 23:01)  T(F): 98.1 (18 Aug 2023 11:04), Max: 98.9 (17 Aug 2023 23:01)  HR: 78 (18 Aug 2023 11:04) (74 - 90)  BP: 116/68 (18 Aug 2023 11:04) (116/68 - 131/79)  BP(mean): --  RR: 16 (18 Aug 2023 11:04) (16 - 16)  SpO2: 91% (18 Aug 2023 11:04) (91% - 94%)    Parameters below as of 18 Aug 2023 11:04  Patient On (Oxygen Delivery Method): room air    LABS: Reviewed.                        5.0    10.50 )-----------( 237      ( 18 Aug 2023 01:00 )             13.8     08-18    133<L>  |  106  |  23<H>  ----------------------------<  94  4.6   |  22  |  1.35<H>    Ca    8.6      18 Aug 2023 01:00    TPro  7.2  /  Alb  3.5  /  TBili  7.5<H>  /  DBili  x   /  AST  125<H>  /  ALT  51  /  AlkPhos  115  08-18    PT/INR - ( 18 Aug 2023 01:00 )   PT: 12.4 sec;   INR: 1.09 ratio       PTT - ( 18 Aug 2023 01:00 )  PTT:43.5 sec  LIVER FUNCTIONS - ( 18 Aug 2023 01:00 )  Alb: 3.5 g/dL / Pro: 7.2 g/dL / ALK PHOS: 115 U/L / ALT: 51 U/L DA / AST: 125 U/L / GGT: x           Urinalysis Basic - ( 18 Aug 2023 01:00 )    Color: x / Appearance: x / SG: x / pH: x  Gluc: 94 mg/dL / Ketone: x  / Bili: x / Urobili: x   Blood: x / Protein: x / Nitrite: x   Leuk Esterase: x / RBC: x / WBC x   Sq Epi: x / Non Sq Epi: x / Bacteria: x    CAPILLARY BLOOD GLUCOSE    Radiology: n/a    ORT Score -   Family Hx of substance abuse	Female	      Male  Alcohol 	                                           1                     3  Illegal drugs	                                   2                     3  Rx drugs                                           4 	                  4  Personal Hx of substance abuse		  Alcohol 	                                          3	                  3  Illegal drugs                                     4	                  4  Rx drugs                                            5 	                  5  Age between 16- 45 years	           1                     1  hx preadolescent sexual abuse	   3 	                  0  Psychological disease		  ADD, OCD, bipolar, schizophrenia   2	          2  Depression                                           1 	          1  Total: 6    a score of 3 or lower indicates low risk for opioid abuse		  a score of 4-7 indicates moderate risk for opioid abuse		  a score of 8 or higher indicates high risk for opioid abuse  	  REVIEW OF SYSTEMS:  CONSTITUTIONAL: No fever or fatigue  HEENT:  No difficulty hearing, no change in vision  NECK: No pain or stiffness  RESPIRATORY: No cough, wheezing, chills or hemoptysis; No shortness of breath  CARDIOVASCULAR: No chest pain, palpitations, dizziness, or leg swelling  GASTROINTESTINAL: No loss of appetite, + decreased PO intake. No abdominal or epigastric pain. + nausea- improving, vomiting; No diarrhea or constipation.   GENITOURINARY: No dysuria, frequency, hematuria, retention or incontinence  MUSCULOSKELETAL: + generalized joint pain, greatest over lumbar back, bilateral ribs, bilateral arms. No joint swelling; no upper or lower motor strength weakness, no saddle anesthesia, bowel/bladder incontinence, no falls   NEURO: No headaches, No numbness/tingling b/l LE, No weakness    PHYSICAL EXAM:  GENERAL:  Alert & Oriented X4, cooperative, NAD, Good concentration. Speech is clear.   RESPIRATORY: Respirations even and unlabored. Clear to auscultation bilaterally; No rales, rhonchi, wheezing, or rubs  CARDIOVASCULAR: Normal S1/S2, regular rate and rhythm; No murmurs, rubs, or gallops. No JVD.    GASTROINTESTINAL:  Soft, Nontender, Nondistended; Bowel sounds present  PERIPHERAL VASCULAR:  Extremities warm without edema. 2+ Peripheral Pulses, No cyanosis, No calf tenderness  MUSCULOSKELETAL: Motor Strength 5/5 B/L upper and lower extremities; moves all extremities equally against gravity; ROM intact; negative SLR; + lumbar back , b/l arm, b/l rib area tenderness on palpation.   SKIN: Warm, dry, intact. No rashes, lesions, scars or wounds.     Risk factors associated with adverse outcomes related to opioid treatment  [ ]  Concurrent benzodiazepine use  [ ]  History/ Active substance use or alcohol use disorder  [ ] Psychiatric co-morbidity  [ ] Sleep apnea  [ ] COPD  [ ] BMI> 35  [x] Liver dysfunction  [x] Renal dysfunction  [ ] CHF  [ ] Smoker  [ ]  Age > 60 years    [x  [place]  Reviewed and Copied to Chart. See below.    Plan of care and goal oriented pain management treatment options were discussed with patient and /or primary care giver; all questions and concerns were addressed and care was aligned with patient's wishes.    Educated patient on goal oriented pain management treatment options

## 2023-08-18 NOTE — ED PROVIDER NOTE - PHYSICAL EXAMINATION
General:mild distress, appears stated age  HEENT: normocephalic, atraumatic, scerla icteric   Respiratory: normal work of breathing  MSK: no swelling or tenderness of lower extremities, moving all extremities spontaneously   Skin: warm, dry  Neuro: A&Ox3, cranial nerves II-XII intact, 5/5 strength in all extremities, no sensory deficits, normal gait   Psych: appropriate affect

## 2023-08-18 NOTE — ED ADULT NURSE NOTE - ED STAT RN HANDOFF DETAILS
Received pt awake, alert and oriented x3. First unit of PRBC in progress, no adverse reaction noted. Pt denies any pain. Received pt awake, alert and oriented x3. First unit of PRBC in progress via right chest wall port, no adverse reaction noted. Pt denies any pain. Refused IV placement and to change into hospital gown.

## 2023-08-18 NOTE — ED PROVIDER NOTE - OBJECTIVE STATEMENT
43-year-old male with sickle cell anemia presents with back pain rib pain arm pain and leg pain.  This is typical of his sickle crises.  He was at Dr. Jordan's office today, his hgb was noted to be 5.6 so he was told to go to the ER for a transfusion.

## 2023-08-18 NOTE — ED PROVIDER NOTE - CLINICAL SUMMARY MEDICAL DECISION MAKING FREE TEXT BOX
Patient having sickle cell pain… Vitals are normal… Physical exam is normal except for icteric sclera… We will check labs and treat if necessary give analgesia and hopefully discharge.

## 2023-08-18 NOTE — ED ADULT NURSE REASSESSMENT NOTE - NS ED NURSE REASSESS COMMENT FT1
1st PRBC completed at 9:15am without reactions. pt asking for Benadryl.  Notified MAR who stated that will put in order for benadryl 25 mg via PO.

## 2023-08-18 NOTE — ED PROVIDER NOTE - NS ED ROS FT
Pt denies fevers, chills  nausea, vomiting,   chest pain, palpitations  shortness of breath, orthopnea  abdominal pain, melena,   dysuria, hematuria

## 2023-08-19 ENCOUNTER — TRANSCRIPTION ENCOUNTER (OUTPATIENT)
Age: 43
End: 2023-08-19

## 2023-08-19 VITALS
SYSTOLIC BLOOD PRESSURE: 161 MMHG | DIASTOLIC BLOOD PRESSURE: 84 MMHG | TEMPERATURE: 98 F | RESPIRATION RATE: 18 BRPM | OXYGEN SATURATION: 92 % | HEART RATE: 71 BPM

## 2023-08-19 LAB
ANION GAP SERPL CALC-SCNC: 7 MMOL/L — SIGNIFICANT CHANGE UP (ref 5–17)
BASOPHILS # BLD AUTO: 0.15 K/UL — SIGNIFICANT CHANGE UP (ref 0–0.2)
BASOPHILS NFR BLD AUTO: 1.7 % — SIGNIFICANT CHANGE UP (ref 0–2)
BUN SERPL-MCNC: 18 MG/DL — SIGNIFICANT CHANGE UP (ref 7–18)
CALCIUM SERPL-MCNC: 8.5 MG/DL — SIGNIFICANT CHANGE UP (ref 8.4–10.5)
CHLORIDE SERPL-SCNC: 111 MMOL/L — HIGH (ref 96–108)
CO2 SERPL-SCNC: 21 MMOL/L — LOW (ref 22–31)
CREAT SERPL-MCNC: 0.9 MG/DL — SIGNIFICANT CHANGE UP (ref 0.5–1.3)
EGFR: 108 ML/MIN/1.73M2 — SIGNIFICANT CHANGE UP
EOSINOPHIL # BLD AUTO: 0.2 K/UL — SIGNIFICANT CHANGE UP (ref 0–0.5)
EOSINOPHIL NFR BLD AUTO: 2.3 % — SIGNIFICANT CHANGE UP (ref 0–6)
GLUCOSE SERPL-MCNC: 86 MG/DL — SIGNIFICANT CHANGE UP (ref 70–99)
HCT VFR BLD CALC: 19.8 % — CRITICAL LOW (ref 39–50)
HGB BLD-MCNC: 7.2 G/DL — LOW (ref 13–17)
IMM GRANULOCYTES NFR BLD AUTO: 1 % — HIGH (ref 0–0.9)
LDH SERPL L TO P-CCNC: 969 U/L — HIGH (ref 120–225)
LYMPHOCYTES # BLD AUTO: 2.63 K/UL — SIGNIFICANT CHANGE UP (ref 1–3.3)
LYMPHOCYTES # BLD AUTO: 30.4 % — SIGNIFICANT CHANGE UP (ref 13–44)
MAGNESIUM SERPL-MCNC: 1.8 MG/DL — SIGNIFICANT CHANGE UP (ref 1.6–2.6)
MCHC RBC-ENTMCNC: 31.7 PG — SIGNIFICANT CHANGE UP (ref 27–34)
MCHC RBC-ENTMCNC: 36.4 GM/DL — HIGH (ref 32–36)
MCV RBC AUTO: 87.2 FL — SIGNIFICANT CHANGE UP (ref 80–100)
MONOCYTES # BLD AUTO: 1 K/UL — HIGH (ref 0–0.9)
MONOCYTES NFR BLD AUTO: 11.6 % — SIGNIFICANT CHANGE UP (ref 2–14)
NEUTROPHILS # BLD AUTO: 4.57 K/UL — SIGNIFICANT CHANGE UP (ref 1.8–7.4)
NEUTROPHILS NFR BLD AUTO: 53 % — SIGNIFICANT CHANGE UP (ref 43–77)
NRBC # BLD: 1 /100 WBCS — HIGH (ref 0–0)
PHOSPHATE SERPL-MCNC: 3.9 MG/DL — SIGNIFICANT CHANGE UP (ref 2.5–4.5)
PLATELET # BLD AUTO: 241 K/UL — SIGNIFICANT CHANGE UP (ref 150–400)
POTASSIUM SERPL-MCNC: 5 MMOL/L — SIGNIFICANT CHANGE UP (ref 3.5–5.3)
POTASSIUM SERPL-SCNC: 5 MMOL/L — SIGNIFICANT CHANGE UP (ref 3.5–5.3)
RBC # BLD: 2.27 M/UL — LOW (ref 4.2–5.8)
RBC # BLD: 2.27 M/UL — LOW (ref 4.2–5.8)
RBC # FLD: 22.8 % — HIGH (ref 10.3–14.5)
RETICS #: 291.5 K/UL — HIGH (ref 25–125)
RETICS/RBC NFR: 12.8 % — HIGH (ref 0.5–2.5)
SODIUM SERPL-SCNC: 139 MMOL/L — SIGNIFICANT CHANGE UP (ref 135–145)
WBC # BLD: 8.64 K/UL — SIGNIFICANT CHANGE UP (ref 3.8–10.5)
WBC # FLD AUTO: 8.64 K/UL — SIGNIFICANT CHANGE UP (ref 3.8–10.5)

## 2023-08-19 PROCEDURE — 99285 EMERGENCY DEPT VISIT HI MDM: CPT | Mod: 25

## 2023-08-19 PROCEDURE — 93005 ELECTROCARDIOGRAM TRACING: CPT

## 2023-08-19 PROCEDURE — 85045 AUTOMATED RETICULOCYTE COUNT: CPT

## 2023-08-19 PROCEDURE — 96375 TX/PRO/DX INJ NEW DRUG ADDON: CPT

## 2023-08-19 PROCEDURE — 86922 COMPATIBILITY TEST ANTIGLOB: CPT

## 2023-08-19 PROCEDURE — 83735 ASSAY OF MAGNESIUM: CPT

## 2023-08-19 PROCEDURE — 36415 COLL VENOUS BLD VENIPUNCTURE: CPT

## 2023-08-19 PROCEDURE — 87637 SARSCOV2&INF A&B&RSV AMP PRB: CPT

## 2023-08-19 PROCEDURE — 86850 RBC ANTIBODY SCREEN: CPT

## 2023-08-19 PROCEDURE — 96374 THER/PROPH/DIAG INJ IV PUSH: CPT

## 2023-08-19 PROCEDURE — 36430 TRANSFUSION BLD/BLD COMPNT: CPT

## 2023-08-19 PROCEDURE — 84100 ASSAY OF PHOSPHORUS: CPT

## 2023-08-19 PROCEDURE — 85025 COMPLETE CBC W/AUTO DIFF WBC: CPT

## 2023-08-19 PROCEDURE — 99239 HOSP IP/OBS DSCHRG MGMT >30: CPT

## 2023-08-19 PROCEDURE — 85610 PROTHROMBIN TIME: CPT

## 2023-08-19 PROCEDURE — 80053 COMPREHEN METABOLIC PANEL: CPT

## 2023-08-19 PROCEDURE — 80048 BASIC METABOLIC PNL TOTAL CA: CPT

## 2023-08-19 PROCEDURE — 86901 BLOOD TYPING SEROLOGIC RH(D): CPT

## 2023-08-19 PROCEDURE — 96376 TX/PRO/DX INJ SAME DRUG ADON: CPT

## 2023-08-19 PROCEDURE — 83615 LACTATE (LD) (LDH) ENZYME: CPT

## 2023-08-19 PROCEDURE — 85730 THROMBOPLASTIN TIME PARTIAL: CPT

## 2023-08-19 PROCEDURE — 86900 BLOOD TYPING SEROLOGIC ABO: CPT

## 2023-08-19 PROCEDURE — P9040: CPT

## 2023-08-19 RX ORDER — OXYCODONE HYDROCHLORIDE 5 MG/1
1 TABLET ORAL
Qty: 0 | Refills: 0 | DISCHARGE

## 2023-08-19 RX ADMIN — HYDROMORPHONE HYDROCHLORIDE 2 MILLIGRAM(S): 2 INJECTION INTRAMUSCULAR; INTRAVENOUS; SUBCUTANEOUS at 11:34

## 2023-08-19 RX ADMIN — Medication 100 MILLIGRAM(S): at 11:25

## 2023-08-19 RX ADMIN — HYDROMORPHONE HYDROCHLORIDE 2 MILLIGRAM(S): 2 INJECTION INTRAMUSCULAR; INTRAVENOUS; SUBCUTANEOUS at 06:20

## 2023-08-19 RX ADMIN — HYDROMORPHONE HYDROCHLORIDE 2 MILLIGRAM(S): 2 INJECTION INTRAMUSCULAR; INTRAVENOUS; SUBCUTANEOUS at 02:16

## 2023-08-19 RX ADMIN — HYDROMORPHONE HYDROCHLORIDE 2 MILLIGRAM(S): 2 INJECTION INTRAMUSCULAR; INTRAVENOUS; SUBCUTANEOUS at 11:42

## 2023-08-19 RX ADMIN — HEPARIN SODIUM 5000 UNIT(S): 5000 INJECTION INTRAVENOUS; SUBCUTANEOUS at 05:23

## 2023-08-19 RX ADMIN — HYDROMORPHONE HYDROCHLORIDE 2 MILLIGRAM(S): 2 INJECTION INTRAMUSCULAR; INTRAVENOUS; SUBCUTANEOUS at 02:31

## 2023-08-19 RX ADMIN — HYDROMORPHONE HYDROCHLORIDE 2 MILLIGRAM(S): 2 INJECTION INTRAMUSCULAR; INTRAVENOUS; SUBCUTANEOUS at 09:03

## 2023-08-19 RX ADMIN — HYDROMORPHONE HYDROCHLORIDE 2 MILLIGRAM(S): 2 INJECTION INTRAMUSCULAR; INTRAVENOUS; SUBCUTANEOUS at 08:37

## 2023-08-19 RX ADMIN — HYDROMORPHONE HYDROCHLORIDE 2 MILLIGRAM(S): 2 INJECTION INTRAMUSCULAR; INTRAVENOUS; SUBCUTANEOUS at 05:20

## 2023-08-19 NOTE — DISCHARGE NOTE PROVIDER - HOSPITAL COURSE
Pt is a 42 yo M w hx sickle cell anemia (baseline Hb 5-6), multiple admissions for pain crisis most recently 5/2023, gout, presenting from o/p hematologist Dr. Jordan's office for anemia and pain.  When he presented to Dr. Jordan's office he was found to have Hb 5, and was sent to ED for further evaluation. Patient was admitted for symptomatic anemia. Patient received 2 units of PRBC's. Hgb improved to 7.2. Pain is improved after blood transfusion. Patient is now optimized for discharge.       Given the clinical course, decision was made to discharge pt with outpatient follow up.  Discharge plan discussed with attending.   This is only a brief summary for the whole hospital course, please follow up with EMR.

## 2023-08-19 NOTE — DISCHARGE NOTE PROVIDER - NSDCCPCAREPLAN_GEN_ALL_CORE_FT
PRINCIPAL DISCHARGE DIAGNOSIS  Diagnosis: Anemia, sickle cell with crisis  Assessment and Plan of Treatment: You were found to have a hgb of 5. You were given two units of blood transfusions. Your hemoglobin now improved to 7.2. Please follow Dr. Jordan hematologist within one week.      SECONDARY DISCHARGE DIAGNOSES  Diagnosis: Gout  Assessment and Plan of Treatment: You have a history of gout. Please continue your medication allopurinol. Please follow up with your primary to follow up with uric acid levels    Diagnosis: Opiate dependence  Assessment and Plan of Treatment: Please continue your home oxycodone 30 mg three times a day for pain control      Diagnosis: MATA (acute kidney injury)  Assessment and Plan of Treatment: You creatinine was elevated due to sickle cell anemia   Creatinine improved with blood transfusions  Avoid taking (NSAIDs) - (ex: Ibuprofen, Advil, Celebrex, Naprosyn)  Avoid taking any nephrotoxic agents (can harm kidneys) - Intravenous contrast for diagnostic testing, combination cold medications.  Have all medications adjusted for your renal function by your Health Care Provider.  Blood pressure control is important.  Take all medication as prescribed.

## 2023-08-19 NOTE — DISCHARGE NOTE NURSING/CASE MANAGEMENT/SOCIAL WORK - PATIENT PORTAL LINK FT
You can access the FollowMyHealth Patient Portal offered by [facility] by registering at the following website: [email or web address]. By joining [email or web address]’s FollowMyHealth portal, you will also be able to view your health information using other applications (apps) compatible with our system.

## 2023-08-19 NOTE — DISCHARGE NOTE NURSING/CASE MANAGEMENT/SOCIAL WORK - NSDCPEFALRISK_GEN_ALL_CORE
For information on Fall & Injury Prevention, visit: [email or web address] OR  [email or web address] OR  https://www.cdc.gov/steadi/patient.html

## 2023-08-19 NOTE — DISCHARGE NOTE PROVIDER - NSDCMRMEDTOKEN_GEN_ALL_CORE_FT
allopurinol 100 mg oral tablet: 1 tab(s) orally once a day  folic acid 1 mg oral tablet: 1 tab(s) orally every 24 hours  polyethylene glycol 3350 oral powder for reconstitution: 17 gram(s) orally once a day, As Needed - for constipation

## 2023-08-19 NOTE — DISCHARGE NOTE PROVIDER - ATTENDING DISCHARGE PHYSICAL EXAMINATION:
patient was seen and examined at bedside. Denies any complains. Denies any pain. Wants to be discharged    ICU Vital Signs Last 24 Hrs  T(C): 36.6 (19 Aug 2023 13:49), Max: 36.8 (19 Aug 2023 06:03)  T(F): 97.9 (19 Aug 2023 13:49), Max: 98.3 (19 Aug 2023 06:03)  HR: 71 (19 Aug 2023 13:49) (64 - 71)  BP: 161/84 (19 Aug 2023 13:49) (132/73 - 161/84)  BP(mean): --  ABP: --  ABP(mean): --  RR: 18 (19 Aug 2023 13:49) (18 - 18)  SpO2: 92% (19 Aug 2023 13:49) (92% - 96%)    O2 Parameters below as of 19 Aug 2023 13:49  Patient On (Oxygen Delivery Method): room air    P/E"  NAD  AAOx3, no focal deficit   CTABL  Chest wall catheter in place   Abd soft, non tender, BS present   BLLE no edema or calf tenderness    labs noted     A/P:  Hb improved. not in pain. Patient is stable to be discharged to resume care with Dr. Jordan.

## 2023-08-19 NOTE — DISCHARGE NOTE PROVIDER - CARE PROVIDER_API CALL
[facility]  Medical Oncology  [address] [place] [id number]  Phone: [phone]  Fax: [phone]  Follow Up Time: 1 week

## 2023-10-12 ENCOUNTER — EMERGENCY (EMERGENCY)
Facility: HOSPITAL | Age: 43
LOS: 1 days | Discharge: HOME | End: 2023-10-12
Attending: EMERGENCY MEDICINE
Payer: MEDICAID

## 2023-10-12 VITALS
RESPIRATION RATE: 19 BRPM | TEMPERATURE: 99 F | WEIGHT: 195.11 LBS | DIASTOLIC BLOOD PRESSURE: 83 MMHG | OXYGEN SATURATION: 90 % | HEIGHT: 69 IN | SYSTOLIC BLOOD PRESSURE: 136 MMHG | HEART RATE: 95 BPM

## 2023-10-12 DIAGNOSIS — Z90.49 ACQUIRED ABSENCE OF OTHER SPECIFIED PARTS OF DIGESTIVE TRACT: Chronic | ICD-10-CM

## 2023-10-12 LAB
ALBUMIN SERPL ELPH-MCNC: 3.7 G/DL — SIGNIFICANT CHANGE UP (ref 3.5–5)
ALP SERPL-CCNC: 139 U/L — HIGH (ref 40–120)
ALT FLD-CCNC: 62 U/L DA — HIGH (ref 10–60)
ANION GAP SERPL CALC-SCNC: 8 MMOL/L — SIGNIFICANT CHANGE UP (ref 5–17)
AST SERPL-CCNC: 148 U/L — HIGH (ref 10–40)
BILIRUB DIRECT SERPL-MCNC: 3.3 MG/DL — HIGH (ref 0–0.3)
BILIRUB INDIRECT FLD-MCNC: 5.1 MG/DL — HIGH (ref 0.2–1)
BILIRUB SERPL-MCNC: 8.4 MG/DL — HIGH (ref 0.2–1.2)
BILIRUB SERPL-MCNC: 8.4 MG/DL — HIGH (ref 0.2–1.2)
BUN SERPL-MCNC: 19 MG/DL — HIGH (ref 7–18)
CALCIUM SERPL-MCNC: 8.6 MG/DL — SIGNIFICANT CHANGE UP (ref 8.4–10.5)
CHLORIDE SERPL-SCNC: 105 MMOL/L — SIGNIFICANT CHANGE UP (ref 96–108)
CO2 SERPL-SCNC: 24 MMOL/L — SIGNIFICANT CHANGE UP (ref 22–31)
CREAT SERPL-MCNC: 1.38 MG/DL — HIGH (ref 0.5–1.3)
EGFR: 65 ML/MIN/1.73M2 — SIGNIFICANT CHANGE UP
GLUCOSE SERPL-MCNC: 105 MG/DL — HIGH (ref 70–99)
HCT VFR BLD CALC: 13.9 % — CRITICAL LOW (ref 39–50)
HGB BLD-MCNC: 5.1 G/DL — CRITICAL LOW (ref 13–17)
MCHC RBC-ENTMCNC: 33.1 PG — SIGNIFICANT CHANGE UP (ref 27–34)
MCHC RBC-ENTMCNC: 36.7 GM/DL — HIGH (ref 32–36)
MCV RBC AUTO: 90.3 FL — SIGNIFICANT CHANGE UP (ref 80–100)
PLATELET # BLD AUTO: 256 K/UL — SIGNIFICANT CHANGE UP (ref 150–400)
POTASSIUM SERPL-MCNC: 4.6 MMOL/L — SIGNIFICANT CHANGE UP (ref 3.5–5.3)
POTASSIUM SERPL-SCNC: 4.6 MMOL/L — SIGNIFICANT CHANGE UP (ref 3.5–5.3)
PROT SERPL-MCNC: 7.6 G/DL — SIGNIFICANT CHANGE UP (ref 6–8.3)
RBC # BLD: 1.54 M/UL — LOW (ref 4.2–5.8)
RBC # BLD: 1.54 M/UL — LOW (ref 4.2–5.8)
RBC # FLD: 27 % — HIGH (ref 10.3–14.5)
RETICS #: 272.9 K/UL — HIGH (ref 25–125)
RETICS/RBC NFR: 17.7 % — HIGH (ref 0.5–2.5)
SODIUM SERPL-SCNC: 137 MMOL/L — SIGNIFICANT CHANGE UP (ref 135–145)
WBC # BLD: 10.4 K/UL — SIGNIFICANT CHANGE UP (ref 3.8–10.5)
WBC # FLD AUTO: 10.4 K/UL — SIGNIFICANT CHANGE UP (ref 3.8–10.5)

## 2023-10-12 PROCEDURE — 93010 ELECTROCARDIOGRAM REPORT: CPT

## 2023-10-12 PROCEDURE — 99285 EMERGENCY DEPT VISIT HI MDM: CPT

## 2023-10-12 RX ORDER — SODIUM CHLORIDE 9 MG/ML
1000 INJECTION, SOLUTION INTRAVENOUS
Refills: 0 | Status: DISCONTINUED | OUTPATIENT
Start: 2023-10-12 | End: 2023-10-16

## 2023-10-12 RX ORDER — ONDANSETRON 8 MG/1
4 TABLET, FILM COATED ORAL ONCE
Refills: 0 | Status: COMPLETED | OUTPATIENT
Start: 2023-10-12 | End: 2023-10-12

## 2023-10-12 RX ORDER — MORPHINE SULFATE 50 MG/1
4 CAPSULE, EXTENDED RELEASE ORAL ONCE
Refills: 0 | Status: DISCONTINUED | OUTPATIENT
Start: 2023-10-12 | End: 2023-10-12

## 2023-10-12 RX ORDER — HYDROMORPHONE HYDROCHLORIDE 2 MG/ML
1 INJECTION INTRAMUSCULAR; INTRAVENOUS; SUBCUTANEOUS ONCE
Refills: 0 | Status: DISCONTINUED | OUTPATIENT
Start: 2023-10-12 | End: 2023-10-12

## 2023-10-12 RX ORDER — KETOROLAC TROMETHAMINE 30 MG/ML
30 SYRINGE (ML) INJECTION ONCE
Refills: 0 | Status: DISCONTINUED | OUTPATIENT
Start: 2023-10-12 | End: 2023-10-12

## 2023-10-12 RX ORDER — ACETAMINOPHEN 500 MG
975 TABLET ORAL ONCE
Refills: 0 | Status: COMPLETED | OUTPATIENT
Start: 2023-10-12 | End: 2023-10-12

## 2023-10-12 RX ORDER — HYDROMORPHONE HYDROCHLORIDE 2 MG/ML
2 INJECTION INTRAMUSCULAR; INTRAVENOUS; SUBCUTANEOUS ONCE
Refills: 0 | Status: DISCONTINUED | OUTPATIENT
Start: 2023-10-12 | End: 2023-10-12

## 2023-10-12 RX ORDER — SODIUM CHLORIDE 9 MG/ML
1000 INJECTION INTRAMUSCULAR; INTRAVENOUS; SUBCUTANEOUS ONCE
Refills: 0 | Status: COMPLETED | OUTPATIENT
Start: 2023-10-12 | End: 2023-10-12

## 2023-10-12 RX ADMIN — MORPHINE SULFATE 4 MILLIGRAM(S): 50 CAPSULE, EXTENDED RELEASE ORAL at 23:47

## 2023-10-12 RX ADMIN — SODIUM CHLORIDE 120 MILLILITER(S): 9 INJECTION, SOLUTION INTRAVENOUS at 23:18

## 2023-10-12 RX ADMIN — ONDANSETRON 4 MILLIGRAM(S): 8 TABLET, FILM COATED ORAL at 23:12

## 2023-10-12 RX ADMIN — MORPHINE SULFATE 4 MILLIGRAM(S): 50 CAPSULE, EXTENDED RELEASE ORAL at 23:12

## 2023-10-13 VITALS
OXYGEN SATURATION: 95 % | HEART RATE: 76 BPM | SYSTOLIC BLOOD PRESSURE: 153 MMHG | TEMPERATURE: 97 F | RESPIRATION RATE: 18 BRPM | DIASTOLIC BLOOD PRESSURE: 92 MMHG

## 2023-10-13 LAB
ALBUMIN SERPL ELPH-MCNC: 3.3 G/DL — LOW (ref 3.5–5)
ALP SERPL-CCNC: 133 U/L — HIGH (ref 40–120)
ALT FLD-CCNC: 54 U/L DA — SIGNIFICANT CHANGE UP (ref 10–60)
ANION GAP SERPL CALC-SCNC: 7 MMOL/L — SIGNIFICANT CHANGE UP (ref 5–17)
ANISOCYTOSIS BLD QL: SLIGHT — SIGNIFICANT CHANGE UP
AST SERPL-CCNC: 134 U/L — HIGH (ref 10–40)
B PERT DNA SPEC QL NAA+PROBE: SIGNIFICANT CHANGE UP
BASOPHILS # BLD AUTO: 0.42 K/UL — HIGH (ref 0–0.2)
BASOPHILS NFR BLD AUTO: 4 % — HIGH (ref 0–2)
BILIRUB SERPL-MCNC: 7.8 MG/DL — HIGH (ref 0.2–1.2)
BLD GP AB SCN SERPL QL: SIGNIFICANT CHANGE UP
BUN SERPL-MCNC: 18 MG/DL — SIGNIFICANT CHANGE UP (ref 7–18)
C PNEUM DNA SPEC QL NAA+PROBE: SIGNIFICANT CHANGE UP
CALCIUM SERPL-MCNC: 8.2 MG/DL — LOW (ref 8.4–10.5)
CHLORIDE SERPL-SCNC: 107 MMOL/L — SIGNIFICANT CHANGE UP (ref 96–108)
CO2 SERPL-SCNC: 24 MMOL/L — SIGNIFICANT CHANGE UP (ref 22–31)
CREAT SERPL-MCNC: 1.23 MG/DL — SIGNIFICANT CHANGE UP (ref 0.5–1.3)
EGFR: 74 ML/MIN/1.73M2 — SIGNIFICANT CHANGE UP
ELLIPTOCYTES BLD QL SMEAR: SLIGHT — SIGNIFICANT CHANGE UP
EOSINOPHIL # BLD AUTO: 0.1 K/UL — SIGNIFICANT CHANGE UP (ref 0–0.5)
EOSINOPHIL NFR BLD AUTO: 1 % — SIGNIFICANT CHANGE UP (ref 0–6)
FERRITIN SERPL-MCNC: 3181 NG/ML — HIGH (ref 30–400)
FLUAV H1 2009 PAND RNA SPEC QL NAA+PROBE: SIGNIFICANT CHANGE UP
FLUAV H1 RNA SPEC QL NAA+PROBE: SIGNIFICANT CHANGE UP
FLUAV H3 RNA SPEC QL NAA+PROBE: SIGNIFICANT CHANGE UP
FLUAV SUBTYP SPEC NAA+PROBE: SIGNIFICANT CHANGE UP
FLUBV RNA SPEC QL NAA+PROBE: SIGNIFICANT CHANGE UP
GLUCOSE SERPL-MCNC: 127 MG/DL — HIGH (ref 70–99)
HADV DNA SPEC QL NAA+PROBE: SIGNIFICANT CHANGE UP
HCOV PNL SPEC NAA+PROBE: SIGNIFICANT CHANGE UP
HIV 1 & 2 AB SERPL IA.RAPID: SIGNIFICANT CHANGE UP
HMPV RNA SPEC QL NAA+PROBE: SIGNIFICANT CHANGE UP
HPIV1 RNA SPEC QL NAA+PROBE: SIGNIFICANT CHANGE UP
HPIV2 RNA SPEC QL NAA+PROBE: SIGNIFICANT CHANGE UP
HPIV3 RNA SPEC QL NAA+PROBE: SIGNIFICANT CHANGE UP
HPIV4 RNA SPEC QL NAA+PROBE: SIGNIFICANT CHANGE UP
IRON SATN MFR SERPL: 263 UG/DL — HIGH (ref 65–170)
IRON SATN MFR SERPL: 90 % — HIGH (ref 20–55)
LDH SERPL L TO P-CCNC: 975 U/L — HIGH (ref 120–225)
LYMPHOCYTES # BLD AUTO: 3.95 K/UL — HIGH (ref 1–3.3)
LYMPHOCYTES # BLD AUTO: 38 % — SIGNIFICANT CHANGE UP (ref 13–44)
MACROCYTES BLD QL: SLIGHT — SIGNIFICANT CHANGE UP
MANUAL SMEAR VERIFICATION: SIGNIFICANT CHANGE UP
MONOCYTES # BLD AUTO: 1.14 K/UL — HIGH (ref 0–0.9)
MONOCYTES NFR BLD AUTO: 11 % — SIGNIFICANT CHANGE UP (ref 2–14)
NEUTROPHILS # BLD AUTO: 4.26 K/UL — SIGNIFICANT CHANGE UP (ref 1.8–7.4)
NEUTROPHILS NFR BLD AUTO: 41 % — LOW (ref 43–77)
NRBC # BLD: 0 /100 — SIGNIFICANT CHANGE UP (ref 0–0)
OVALOCYTES BLD QL SMEAR: SLIGHT — SIGNIFICANT CHANGE UP
PLAT MORPH BLD: NORMAL — SIGNIFICANT CHANGE UP
PLATELET COUNT - ESTIMATE: NORMAL — SIGNIFICANT CHANGE UP
POIKILOCYTOSIS BLD QL AUTO: SIGNIFICANT CHANGE UP
POLYCHROMASIA BLD QL SMEAR: SLIGHT — SIGNIFICANT CHANGE UP
POTASSIUM SERPL-MCNC: 4.4 MMOL/L — SIGNIFICANT CHANGE UP (ref 3.5–5.3)
POTASSIUM SERPL-SCNC: 4.4 MMOL/L — SIGNIFICANT CHANGE UP (ref 3.5–5.3)
PROT SERPL-MCNC: 7 G/DL — SIGNIFICANT CHANGE UP (ref 6–8.3)
RAPID RVP RESULT: SIGNIFICANT CHANGE UP
RBC BLD AUTO: ABNORMAL
RSV RNA SPEC QL NAA+PROBE: SIGNIFICANT CHANGE UP
RV+EV RNA SPEC QL NAA+PROBE: SIGNIFICANT CHANGE UP
SARS-COV-2 RNA SPEC QL NAA+PROBE: SIGNIFICANT CHANGE UP
SCHISTOCYTES BLD QL AUTO: SLIGHT — SIGNIFICANT CHANGE UP
SICKLE CELLS BLD QL SMEAR: SIGNIFICANT CHANGE UP
SODIUM SERPL-SCNC: 138 MMOL/L — SIGNIFICANT CHANGE UP (ref 135–145)
STOMATOCYTES BLD QL SMEAR: SLIGHT — SIGNIFICANT CHANGE UP
TIBC SERPL-MCNC: 293 UG/DL — SIGNIFICANT CHANGE UP (ref 250–450)
UIBC SERPL-MCNC: 30 UG/DL — LOW (ref 110–370)
VARIANT LYMPHS # BLD: 5 % — SIGNIFICANT CHANGE UP (ref 0–6)

## 2023-10-13 PROCEDURE — 86850 RBC ANTIBODY SCREEN: CPT

## 2023-10-13 PROCEDURE — 86703 HIV-1/HIV-2 1 RESULT ANTBDY: CPT

## 2023-10-13 PROCEDURE — 82248 BILIRUBIN DIRECT: CPT

## 2023-10-13 PROCEDURE — 83020 HEMOGLOBIN ELECTROPHORESIS: CPT | Mod: 26

## 2023-10-13 PROCEDURE — 96372 THER/PROPH/DIAG INJ SC/IM: CPT | Mod: XU

## 2023-10-13 PROCEDURE — 93005 ELECTROCARDIOGRAM TRACING: CPT

## 2023-10-13 PROCEDURE — 36415 COLL VENOUS BLD VENIPUNCTURE: CPT

## 2023-10-13 PROCEDURE — 80053 COMPREHEN METABOLIC PANEL: CPT

## 2023-10-13 PROCEDURE — P9040: CPT

## 2023-10-13 PROCEDURE — 83020 HEMOGLOBIN ELECTROPHORESIS: CPT

## 2023-10-13 PROCEDURE — 82247 BILIRUBIN TOTAL: CPT

## 2023-10-13 PROCEDURE — 96376 TX/PRO/DX INJ SAME DRUG ADON: CPT

## 2023-10-13 PROCEDURE — 86901 BLOOD TYPING SEROLOGIC RH(D): CPT

## 2023-10-13 PROCEDURE — 85025 COMPLETE CBC W/AUTO DIFF WBC: CPT

## 2023-10-13 PROCEDURE — 82728 ASSAY OF FERRITIN: CPT

## 2023-10-13 PROCEDURE — 86900 BLOOD TYPING SEROLOGIC ABO: CPT

## 2023-10-13 PROCEDURE — 36430 TRANSFUSION BLD/BLD COMPNT: CPT

## 2023-10-13 PROCEDURE — 85045 AUTOMATED RETICULOCYTE COUNT: CPT

## 2023-10-13 PROCEDURE — 96375 TX/PRO/DX INJ NEW DRUG ADDON: CPT

## 2023-10-13 PROCEDURE — 96374 THER/PROPH/DIAG INJ IV PUSH: CPT

## 2023-10-13 PROCEDURE — 83550 IRON BINDING TEST: CPT

## 2023-10-13 PROCEDURE — 83540 ASSAY OF IRON: CPT

## 2023-10-13 PROCEDURE — 83615 LACTATE (LD) (LDH) ENZYME: CPT

## 2023-10-13 PROCEDURE — 0225U NFCT DS DNA&RNA 21 SARSCOV2: CPT

## 2023-10-13 PROCEDURE — 86922 COMPATIBILITY TEST ANTIGLOB: CPT

## 2023-10-13 PROCEDURE — 99285 EMERGENCY DEPT VISIT HI MDM: CPT | Mod: 25

## 2023-10-13 RX ORDER — HYDROMORPHONE HYDROCHLORIDE 2 MG/ML
2 INJECTION INTRAMUSCULAR; INTRAVENOUS; SUBCUTANEOUS ONCE
Refills: 0 | Status: DISCONTINUED | OUTPATIENT
Start: 2023-10-13 | End: 2023-10-13

## 2023-10-13 RX ORDER — DIPHENHYDRAMINE HCL 50 MG
50 CAPSULE ORAL EVERY 4 HOURS
Refills: 0 | Status: DISCONTINUED | OUTPATIENT
Start: 2023-10-13 | End: 2023-10-16

## 2023-10-13 RX ADMIN — HYDROMORPHONE HYDROCHLORIDE 2 MILLIGRAM(S): 2 INJECTION INTRAMUSCULAR; INTRAVENOUS; SUBCUTANEOUS at 08:15

## 2023-10-13 RX ADMIN — HYDROMORPHONE HYDROCHLORIDE 2 MILLIGRAM(S): 2 INJECTION INTRAMUSCULAR; INTRAVENOUS; SUBCUTANEOUS at 00:37

## 2023-10-13 RX ADMIN — Medication 30 MILLIGRAM(S): at 01:10

## 2023-10-13 RX ADMIN — HYDROMORPHONE HYDROCHLORIDE 2 MILLIGRAM(S): 2 INJECTION INTRAMUSCULAR; INTRAVENOUS; SUBCUTANEOUS at 05:05

## 2023-10-13 RX ADMIN — Medication 975 MILLIGRAM(S): at 00:37

## 2023-10-13 RX ADMIN — SODIUM CHLORIDE 120 MILLILITER(S): 9 INJECTION, SOLUTION INTRAVENOUS at 08:22

## 2023-10-13 RX ADMIN — Medication 50 MILLIGRAM(S): at 02:33

## 2023-10-13 RX ADMIN — Medication 30 MILLIGRAM(S): at 00:37

## 2023-10-13 RX ADMIN — Medication 975 MILLIGRAM(S): at 01:10

## 2023-10-13 RX ADMIN — HYDROMORPHONE HYDROCHLORIDE 2 MILLIGRAM(S): 2 INJECTION INTRAMUSCULAR; INTRAVENOUS; SUBCUTANEOUS at 01:10

## 2023-10-13 RX ADMIN — HYDROMORPHONE HYDROCHLORIDE 2 MILLIGRAM(S): 2 INJECTION INTRAMUSCULAR; INTRAVENOUS; SUBCUTANEOUS at 05:35

## 2023-10-13 RX ADMIN — SODIUM CHLORIDE 1000 MILLILITER(S): 9 INJECTION INTRAMUSCULAR; INTRAVENOUS; SUBCUTANEOUS at 00:49

## 2023-10-13 NOTE — ED ADULT NURSE REASSESSMENT NOTE - NS ED NURSE REASSESS COMMENT FT1
Patient is alert and oriented x3. Patient received 2x PRBC's as ordered. No sign of transfusion reaction noted. Vital signs are stable. No distress noted. Safety precaution maintained.

## 2023-10-13 NOTE — ED PROVIDER NOTE - OBJECTIVE STATEMENT
43-year-old male with sickle cell disease presents with pain.  Patient states he was feeling mildly short of breath so went to his primary care doctor today and his hemoglobin was 5.1.  He was told to come straight to the emergency department but he went home and went to sleep and now he is in a lot of pain.

## 2023-10-13 NOTE — ED PROVIDER NOTE - PHYSICAL EXAMINATION
General: mild distress, appears stated age, icteric sclera   HEENT: normocephalic, atraumatic   Respiratory: normal work of breathing  MSK: no swelling or tenderness of lower extremities, moving all extremities spontaneously   Skin: warm, dry  Neuro: A&Ox3, cranial nerves II-XII intact, 5/5 strength in all extremities, no sensory deficits, normal gait   Psych: appropriate affect

## 2023-10-13 NOTE — ED PROVIDER NOTE - PROGRESS NOTE DETAILS
Patient clinically well. endorses symptoms improved and state he feels ready to go home. pain controlled. hemodynamically stable. instructed to fu pmd and hematologist. clinically stable on dc

## 2023-10-13 NOTE — ED PROVIDER NOTE - PATIENT PORTAL LINK FT
You can access the FollowMyHealth Patient Portal offered by [facility] by registering at the following website: [email or web address]. By joining [employer]’s FollowMyHealth portal, you will also be able to view your health information using other applications (apps) compatible with our system.

## 2023-10-13 NOTE — ED PROVIDER NOTE - IV ALTEPLASE EXCL ABS HIDDEN
Post-Op Assessment Note    CV Status:  Stable  Pain Score: 0    Pain management: adequate     Mental Status:  Alert and awake   Hydration Status:  Euvolemic   PONV Controlled:  Controlled   Airway Patency:  Patent      Post Op Vitals Reviewed: Yes      Staff: Anesthesiologist, CRNA         No complications documented      BP 93/60   Temp     Pulse  63   Resp   12   SpO2   99
show

## 2023-10-13 NOTE — ED ADULT NURSE REASSESSMENT NOTE - NS ED NURSE REASSESS COMMENT FT1
Patient is alert and oriented x3. O2 sat noted in the low 90's on RA. Patient states "whenever my blood count is low, my oxygen level is low." Patient denies SOB. No sign of respiratory distress noted. Dr. Ignacio made aware. Patient is alert and oriented x3. O2 sat noted in the low 90's on RA. Patient states "whenever my blood count is low, my oxygen level is low." Patient denies SOB. No sign of respiratory distress noted. Dr. Garcia made aware.

## 2023-10-13 NOTE — ED PROVIDER NOTE - CLINICAL SUMMARY MEDICAL DECISION MAKING FREE TEXT BOX
Patient states he is having sickle cells crisis.  He denies that he is having chest pain or strokelike symptoms.  States his oncologist told him was anemic today he went home and went to sleep ran out of medicines he comes here for pain management and transfusion.  02 88% which he states is normal for him when anemic.

## 2023-10-13 NOTE — ED PROVIDER NOTE - CARE PROVIDER_API CALL
Julian [facility]  Medical Oncology  [address] [place] [id number]  Phone: [phone]  Fax: [phone]  Follow Up Time:

## 2023-10-17 ENCOUNTER — INPATIENT (INPATIENT)
Facility: HOSPITAL | Age: 43
LOS: 5 days | Discharge: HOME | DRG: 871 | End: 2023-10-23
Attending: STUDENT IN AN ORGANIZED HEALTH CARE EDUCATION/TRAINING PROGRAM | Admitting: STUDENT IN AN ORGANIZED HEALTH CARE EDUCATION/TRAINING PROGRAM
Payer: MEDICAID

## 2023-10-17 VITALS
WEIGHT: 190.04 LBS | TEMPERATURE: 102 F | HEART RATE: 114 BPM | OXYGEN SATURATION: 96 % | HEIGHT: 69 IN | RESPIRATION RATE: 19 BRPM | SYSTOLIC BLOOD PRESSURE: 164 MMHG | DIASTOLIC BLOOD PRESSURE: 82 MMHG

## 2023-10-17 DIAGNOSIS — Z90.49 ACQUIRED ABSENCE OF OTHER SPECIFIED PARTS OF DIGESTIVE TRACT: Chronic | ICD-10-CM

## 2023-10-17 PROCEDURE — 99285 EMERGENCY DEPT VISIT HI MDM: CPT | Mod: 25

## 2023-10-18 DIAGNOSIS — N39.0 URINARY TRACT INFECTION, SITE NOT SPECIFIED: ICD-10-CM

## 2023-10-18 DIAGNOSIS — M10.9 GOUT, UNSPECIFIED: ICD-10-CM

## 2023-10-18 DIAGNOSIS — J06.9 ACUTE UPPER RESPIRATORY INFECTION, UNSPECIFIED: ICD-10-CM

## 2023-10-18 DIAGNOSIS — D57.00 HB-SS DISEASE WITH CRISIS, UNSPECIFIED: ICD-10-CM

## 2023-10-18 DIAGNOSIS — A41.9 SEPSIS, UNSPECIFIED ORGANISM: ICD-10-CM

## 2023-10-18 DIAGNOSIS — N17.9 ACUTE KIDNEY FAILURE, UNSPECIFIED: ICD-10-CM

## 2023-10-18 DIAGNOSIS — Z29.9 ENCOUNTER FOR PROPHYLACTIC MEASURES, UNSPECIFIED: ICD-10-CM

## 2023-10-18 LAB
ALBUMIN SERPL ELPH-MCNC: 3.5 G/DL — SIGNIFICANT CHANGE UP (ref 3.5–5)
ALBUMIN SERPL ELPH-MCNC: 3.5 G/DL — SIGNIFICANT CHANGE UP (ref 3.5–5)
ALP SERPL-CCNC: 124 U/L — HIGH (ref 40–120)
ALP SERPL-CCNC: 124 U/L — HIGH (ref 40–120)
ALT FLD-CCNC: 40 U/L DA — SIGNIFICANT CHANGE UP (ref 10–60)
ALT FLD-CCNC: 40 U/L DA — SIGNIFICANT CHANGE UP (ref 10–60)
ANION GAP SERPL CALC-SCNC: 8 MMOL/L — SIGNIFICANT CHANGE UP (ref 5–17)
ANION GAP SERPL CALC-SCNC: 8 MMOL/L — SIGNIFICANT CHANGE UP (ref 5–17)
APPEARANCE UR: CLEAR — SIGNIFICANT CHANGE UP
APPEARANCE UR: CLEAR — SIGNIFICANT CHANGE UP
APTT BLD: 45 SEC — HIGH (ref 24.5–35.6)
APTT BLD: 45 SEC — HIGH (ref 24.5–35.6)
AST SERPL-CCNC: 125 U/L — HIGH (ref 10–40)
AST SERPL-CCNC: 125 U/L — HIGH (ref 10–40)
BASOPHILS # BLD AUTO: 0.1 K/UL — SIGNIFICANT CHANGE UP (ref 0–0.2)
BASOPHILS # BLD AUTO: 0.1 K/UL — SIGNIFICANT CHANGE UP (ref 0–0.2)
BASOPHILS NFR BLD AUTO: 0.4 % — SIGNIFICANT CHANGE UP (ref 0–2)
BASOPHILS NFR BLD AUTO: 0.4 % — SIGNIFICANT CHANGE UP (ref 0–2)
BILIRUB DIRECT SERPL-MCNC: 6.5 MG/DL — HIGH (ref 0–0.3)
BILIRUB DIRECT SERPL-MCNC: 6.5 MG/DL — HIGH (ref 0–0.3)
BILIRUB INDIRECT FLD-MCNC: 5.6 MG/DL — HIGH (ref 0.2–1)
BILIRUB INDIRECT FLD-MCNC: 5.6 MG/DL — HIGH (ref 0.2–1)
BILIRUB SERPL-MCNC: 12.1 MG/DL — HIGH (ref 0.2–1.2)
BILIRUB UR-MCNC: ABNORMAL
BILIRUB UR-MCNC: ABNORMAL
BUN SERPL-MCNC: 29 MG/DL — HIGH (ref 7–18)
BUN SERPL-MCNC: 29 MG/DL — HIGH (ref 7–18)
CALCIUM SERPL-MCNC: 9.2 MG/DL — SIGNIFICANT CHANGE UP (ref 8.4–10.5)
CALCIUM SERPL-MCNC: 9.2 MG/DL — SIGNIFICANT CHANGE UP (ref 8.4–10.5)
CHLORIDE SERPL-SCNC: 107 MMOL/L — SIGNIFICANT CHANGE UP (ref 96–108)
CHLORIDE SERPL-SCNC: 107 MMOL/L — SIGNIFICANT CHANGE UP (ref 96–108)
CO2 SERPL-SCNC: 21 MMOL/L — LOW (ref 22–31)
CO2 SERPL-SCNC: 21 MMOL/L — LOW (ref 22–31)
COLOR SPEC: ABNORMAL
COLOR SPEC: ABNORMAL
CREAT SERPL-MCNC: 1.32 MG/DL — HIGH (ref 0.5–1.3)
CREAT SERPL-MCNC: 1.32 MG/DL — HIGH (ref 0.5–1.3)
DIFF PNL FLD: ABNORMAL
DIFF PNL FLD: ABNORMAL
EGFR: 68 ML/MIN/1.73M2 — SIGNIFICANT CHANGE UP
EGFR: 68 ML/MIN/1.73M2 — SIGNIFICANT CHANGE UP
EOSINOPHIL # BLD AUTO: 0.06 K/UL — SIGNIFICANT CHANGE UP (ref 0–0.5)
EOSINOPHIL # BLD AUTO: 0.06 K/UL — SIGNIFICANT CHANGE UP (ref 0–0.5)
EOSINOPHIL NFR BLD AUTO: 0.2 % — SIGNIFICANT CHANGE UP (ref 0–6)
EOSINOPHIL NFR BLD AUTO: 0.2 % — SIGNIFICANT CHANGE UP (ref 0–6)
FERRITIN SERPL-MCNC: 3224 NG/ML — HIGH (ref 30–400)
FERRITIN SERPL-MCNC: 3224 NG/ML — HIGH (ref 30–400)
FLUAV AG NPH QL: SIGNIFICANT CHANGE UP
FLUAV AG NPH QL: SIGNIFICANT CHANGE UP
FLUBV AG NPH QL: SIGNIFICANT CHANGE UP
FLUBV AG NPH QL: SIGNIFICANT CHANGE UP
GLUCOSE SERPL-MCNC: 114 MG/DL — HIGH (ref 70–99)
GLUCOSE SERPL-MCNC: 114 MG/DL — HIGH (ref 70–99)
GLUCOSE UR QL: NEGATIVE MG/DL — SIGNIFICANT CHANGE UP
GLUCOSE UR QL: NEGATIVE MG/DL — SIGNIFICANT CHANGE UP
HCT VFR BLD CALC: 18.7 % — CRITICAL LOW (ref 39–50)
HCT VFR BLD CALC: 18.7 % — CRITICAL LOW (ref 39–50)
HGB BLD-MCNC: 6.6 G/DL — CRITICAL LOW (ref 13–17)
HGB BLD-MCNC: 6.6 G/DL — CRITICAL LOW (ref 13–17)
IMM GRANULOCYTES NFR BLD AUTO: 1.6 % — HIGH (ref 0–0.9)
IMM GRANULOCYTES NFR BLD AUTO: 1.6 % — HIGH (ref 0–0.9)
INR BLD: 1.25 RATIO — HIGH (ref 0.85–1.18)
INR BLD: 1.25 RATIO — HIGH (ref 0.85–1.18)
IRON SATN MFR SERPL: 16 % — LOW (ref 20–55)
IRON SATN MFR SERPL: 16 % — LOW (ref 20–55)
IRON SATN MFR SERPL: 47 UG/DL — LOW (ref 65–170)
IRON SATN MFR SERPL: 47 UG/DL — LOW (ref 65–170)
KETONES UR-MCNC: NEGATIVE MG/DL — SIGNIFICANT CHANGE UP
KETONES UR-MCNC: NEGATIVE MG/DL — SIGNIFICANT CHANGE UP
LACTATE SERPL-SCNC: 1 MMOL/L — SIGNIFICANT CHANGE UP (ref 0.7–2)
LACTATE SERPL-SCNC: 1 MMOL/L — SIGNIFICANT CHANGE UP (ref 0.7–2)
LDH SERPL L TO P-CCNC: 1076 U/L — HIGH (ref 120–225)
LDH SERPL L TO P-CCNC: 1076 U/L — HIGH (ref 120–225)
LEUKOCYTE ESTERASE UR-ACNC: ABNORMAL
LEUKOCYTE ESTERASE UR-ACNC: ABNORMAL
LYMPHOCYTES # BLD AUTO: 1.85 K/UL — SIGNIFICANT CHANGE UP (ref 1–3.3)
LYMPHOCYTES # BLD AUTO: 1.85 K/UL — SIGNIFICANT CHANGE UP (ref 1–3.3)
LYMPHOCYTES # BLD AUTO: 7.2 % — LOW (ref 13–44)
LYMPHOCYTES # BLD AUTO: 7.2 % — LOW (ref 13–44)
MCHC RBC-ENTMCNC: 30.7 PG — SIGNIFICANT CHANGE UP (ref 27–34)
MCHC RBC-ENTMCNC: 30.7 PG — SIGNIFICANT CHANGE UP (ref 27–34)
MCHC RBC-ENTMCNC: 35.3 GM/DL — SIGNIFICANT CHANGE UP (ref 32–36)
MCHC RBC-ENTMCNC: 35.3 GM/DL — SIGNIFICANT CHANGE UP (ref 32–36)
MCV RBC AUTO: 87 FL — SIGNIFICANT CHANGE UP (ref 80–100)
MCV RBC AUTO: 87 FL — SIGNIFICANT CHANGE UP (ref 80–100)
MONOCYTES # BLD AUTO: 2.79 K/UL — HIGH (ref 0–0.9)
MONOCYTES # BLD AUTO: 2.79 K/UL — HIGH (ref 0–0.9)
MONOCYTES NFR BLD AUTO: 10.9 % — SIGNIFICANT CHANGE UP (ref 2–14)
MONOCYTES NFR BLD AUTO: 10.9 % — SIGNIFICANT CHANGE UP (ref 2–14)
NEUTROPHILS # BLD AUTO: 20.38 K/UL — HIGH (ref 1.8–7.4)
NEUTROPHILS # BLD AUTO: 20.38 K/UL — HIGH (ref 1.8–7.4)
NEUTROPHILS NFR BLD AUTO: 79.7 % — HIGH (ref 43–77)
NEUTROPHILS NFR BLD AUTO: 79.7 % — HIGH (ref 43–77)
NITRITE UR-MCNC: POSITIVE
NITRITE UR-MCNC: POSITIVE
NRBC # BLD: 0 /100 WBCS — SIGNIFICANT CHANGE UP (ref 0–0)
NRBC # BLD: 0 /100 WBCS — SIGNIFICANT CHANGE UP (ref 0–0)
PH UR: 5 — SIGNIFICANT CHANGE UP (ref 5–8)
PH UR: 5 — SIGNIFICANT CHANGE UP (ref 5–8)
PLATELET # BLD AUTO: 227 K/UL — SIGNIFICANT CHANGE UP (ref 150–400)
PLATELET # BLD AUTO: 227 K/UL — SIGNIFICANT CHANGE UP (ref 150–400)
POTASSIUM SERPL-MCNC: 4.4 MMOL/L — SIGNIFICANT CHANGE UP (ref 3.5–5.3)
POTASSIUM SERPL-MCNC: 4.4 MMOL/L — SIGNIFICANT CHANGE UP (ref 3.5–5.3)
POTASSIUM SERPL-SCNC: 4.4 MMOL/L — SIGNIFICANT CHANGE UP (ref 3.5–5.3)
POTASSIUM SERPL-SCNC: 4.4 MMOL/L — SIGNIFICANT CHANGE UP (ref 3.5–5.3)
PROT SERPL-MCNC: 7.7 G/DL — SIGNIFICANT CHANGE UP (ref 6–8.3)
PROT SERPL-MCNC: 7.7 G/DL — SIGNIFICANT CHANGE UP (ref 6–8.3)
PROT UR-MCNC: 300 MG/DL
PROT UR-MCNC: 300 MG/DL
PROTHROM AB SERPL-ACNC: 14.2 SEC — HIGH (ref 9.5–13)
PROTHROM AB SERPL-ACNC: 14.2 SEC — HIGH (ref 9.5–13)
RBC # BLD: 2.15 M/UL — LOW (ref 4.2–5.8)
RBC # FLD: 19.8 % — HIGH (ref 10.3–14.5)
RBC # FLD: 19.8 % — HIGH (ref 10.3–14.5)
RETICS #: 118.3 K/UL — SIGNIFICANT CHANGE UP (ref 25–125)
RETICS #: 118.3 K/UL — SIGNIFICANT CHANGE UP (ref 25–125)
RETICS/RBC NFR: 5.5 % — HIGH (ref 0.5–2.5)
RETICS/RBC NFR: 5.5 % — HIGH (ref 0.5–2.5)
SARS-COV-2 RNA SPEC QL NAA+PROBE: SIGNIFICANT CHANGE UP
SARS-COV-2 RNA SPEC QL NAA+PROBE: SIGNIFICANT CHANGE UP
SODIUM SERPL-SCNC: 136 MMOL/L — SIGNIFICANT CHANGE UP (ref 135–145)
SODIUM SERPL-SCNC: 136 MMOL/L — SIGNIFICANT CHANGE UP (ref 135–145)
SP GR SPEC: 1.01 — SIGNIFICANT CHANGE UP (ref 1–1.03)
SP GR SPEC: 1.01 — SIGNIFICANT CHANGE UP (ref 1–1.03)
TIBC SERPL-MCNC: 293 UG/DL — SIGNIFICANT CHANGE UP (ref 250–450)
TIBC SERPL-MCNC: 293 UG/DL — SIGNIFICANT CHANGE UP (ref 250–450)
UIBC SERPL-MCNC: 246 UG/DL — SIGNIFICANT CHANGE UP (ref 110–370)
UIBC SERPL-MCNC: 246 UG/DL — SIGNIFICANT CHANGE UP (ref 110–370)
UROBILINOGEN FLD QL: 1 MG/DL — SIGNIFICANT CHANGE UP (ref 0.2–1)
UROBILINOGEN FLD QL: 1 MG/DL — SIGNIFICANT CHANGE UP (ref 0.2–1)
WBC # BLD: 25.59 K/UL — HIGH (ref 3.8–10.5)
WBC # BLD: 25.59 K/UL — HIGH (ref 3.8–10.5)
WBC # FLD AUTO: 25.59 K/UL — HIGH (ref 3.8–10.5)
WBC # FLD AUTO: 25.59 K/UL — HIGH (ref 3.8–10.5)

## 2023-10-18 PROCEDURE — 74177 CT ABD & PELVIS W/CONTRAST: CPT | Mod: 26,MA

## 2023-10-18 PROCEDURE — 99223 1ST HOSP IP/OBS HIGH 75: CPT | Mod: GC

## 2023-10-18 PROCEDURE — 71045 X-RAY EXAM CHEST 1 VIEW: CPT | Mod: 26

## 2023-10-18 RX ORDER — SENNA PLUS 8.6 MG/1
2 TABLET ORAL AT BEDTIME
Refills: 0 | Status: DISCONTINUED | OUTPATIENT
Start: 2023-10-18 | End: 2023-10-23

## 2023-10-18 RX ORDER — HYDROMORPHONE HYDROCHLORIDE 2 MG/ML
2 INJECTION INTRAMUSCULAR; INTRAVENOUS; SUBCUTANEOUS ONCE
Refills: 0 | Status: DISCONTINUED | OUTPATIENT
Start: 2023-10-18 | End: 2023-10-18

## 2023-10-18 RX ORDER — HYDROMORPHONE HYDROCHLORIDE 2 MG/ML
1 INJECTION INTRAMUSCULAR; INTRAVENOUS; SUBCUTANEOUS
Refills: 0 | Status: DISCONTINUED | OUTPATIENT
Start: 2023-10-18 | End: 2023-10-19

## 2023-10-18 RX ORDER — ALLOPURINOL 300 MG
100 TABLET ORAL DAILY
Refills: 0 | Status: DISCONTINUED | OUTPATIENT
Start: 2023-10-18 | End: 2023-10-23

## 2023-10-18 RX ORDER — DIPHENHYDRAMINE HCL 50 MG
25 CAPSULE ORAL ONCE
Refills: 0 | Status: COMPLETED | OUTPATIENT
Start: 2023-10-18 | End: 2023-10-18

## 2023-10-18 RX ORDER — ONDANSETRON 8 MG/1
4 TABLET, FILM COATED ORAL EVERY 8 HOURS
Refills: 0 | Status: DISCONTINUED | OUTPATIENT
Start: 2023-10-18 | End: 2023-10-23

## 2023-10-18 RX ORDER — AZTREONAM 2 G
2000 VIAL (EA) INJECTION ONCE
Refills: 0 | Status: COMPLETED | OUTPATIENT
Start: 2023-10-18 | End: 2023-10-18

## 2023-10-18 RX ORDER — ACETAMINOPHEN 500 MG
650 TABLET ORAL EVERY 6 HOURS
Refills: 0 | Status: DISCONTINUED | OUTPATIENT
Start: 2023-10-18 | End: 2023-10-19

## 2023-10-18 RX ORDER — HYDROMORPHONE HYDROCHLORIDE 2 MG/ML
1 INJECTION INTRAMUSCULAR; INTRAVENOUS; SUBCUTANEOUS ONCE
Refills: 0 | Status: DISCONTINUED | OUTPATIENT
Start: 2023-10-18 | End: 2023-10-18

## 2023-10-18 RX ORDER — SODIUM CHLORIDE 9 MG/ML
1000 INJECTION, SOLUTION INTRAVENOUS
Refills: 0 | Status: DISCONTINUED | OUTPATIENT
Start: 2023-10-18 | End: 2023-10-18

## 2023-10-18 RX ORDER — FOLIC ACID 0.8 MG
1 TABLET ORAL EVERY 24 HOURS
Refills: 0 | Status: DISCONTINUED | OUTPATIENT
Start: 2023-10-18 | End: 2023-10-23

## 2023-10-18 RX ORDER — HYDROMORPHONE HYDROCHLORIDE 2 MG/ML
2 INJECTION INTRAMUSCULAR; INTRAVENOUS; SUBCUTANEOUS
Refills: 0 | Status: DISCONTINUED | OUTPATIENT
Start: 2023-10-18 | End: 2023-10-23

## 2023-10-18 RX ORDER — NALOXONE HYDROCHLORIDE 4 MG/.1ML
0.4 SPRAY NASAL ONCE
Refills: 0 | Status: DISCONTINUED | OUTPATIENT
Start: 2023-10-18 | End: 2023-10-23

## 2023-10-18 RX ORDER — ONDANSETRON 8 MG/1
4 TABLET, FILM COATED ORAL ONCE
Refills: 0 | Status: COMPLETED | OUTPATIENT
Start: 2023-10-18 | End: 2023-10-18

## 2023-10-18 RX ORDER — POLYETHYLENE GLYCOL 3350 17 G/17G
17 POWDER, FOR SOLUTION ORAL DAILY
Refills: 0 | Status: DISCONTINUED | OUTPATIENT
Start: 2023-10-18 | End: 2023-10-23

## 2023-10-18 RX ORDER — SODIUM CHLORIDE 9 MG/ML
1000 INJECTION, SOLUTION INTRAVENOUS
Refills: 0 | Status: DISCONTINUED | OUTPATIENT
Start: 2023-10-18 | End: 2023-10-21

## 2023-10-18 RX ORDER — HEPARIN SODIUM 5000 [USP'U]/ML
5000 INJECTION INTRAVENOUS; SUBCUTANEOUS EVERY 12 HOURS
Refills: 0 | Status: DISCONTINUED | OUTPATIENT
Start: 2023-10-18 | End: 2023-10-23

## 2023-10-18 RX ORDER — SODIUM CHLORIDE 9 MG/ML
2700 INJECTION INTRAMUSCULAR; INTRAVENOUS; SUBCUTANEOUS ONCE
Refills: 0 | Status: COMPLETED | OUTPATIENT
Start: 2023-10-18 | End: 2023-10-18

## 2023-10-18 RX ORDER — ACETAMINOPHEN 500 MG
975 TABLET ORAL ONCE
Refills: 0 | Status: COMPLETED | OUTPATIENT
Start: 2023-10-18 | End: 2023-10-18

## 2023-10-18 RX ORDER — LIDOCAINE 4 G/100G
1 CREAM TOPICAL DAILY
Refills: 0 | Status: DISCONTINUED | OUTPATIENT
Start: 2023-10-18 | End: 2023-10-23

## 2023-10-18 RX ADMIN — HYDROMORPHONE HYDROCHLORIDE 1 MILLIGRAM(S): 2 INJECTION INTRAMUSCULAR; INTRAVENOUS; SUBCUTANEOUS at 12:46

## 2023-10-18 RX ADMIN — Medication 100 MILLIGRAM(S): at 18:49

## 2023-10-18 RX ADMIN — ONDANSETRON 4 MILLIGRAM(S): 8 TABLET, FILM COATED ORAL at 22:14

## 2023-10-18 RX ADMIN — Medication 25 MILLIGRAM(S): at 07:20

## 2023-10-18 RX ADMIN — HYDROMORPHONE HYDROCHLORIDE 1 MILLIGRAM(S): 2 INJECTION INTRAMUSCULAR; INTRAVENOUS; SUBCUTANEOUS at 19:22

## 2023-10-18 RX ADMIN — HYDROMORPHONE HYDROCHLORIDE 1 MILLIGRAM(S): 2 INJECTION INTRAMUSCULAR; INTRAVENOUS; SUBCUTANEOUS at 03:40

## 2023-10-18 RX ADMIN — Medication 25 MILLIGRAM(S): at 02:49

## 2023-10-18 RX ADMIN — SODIUM CHLORIDE 150 MILLILITER(S): 9 INJECTION, SOLUTION INTRAVENOUS at 14:40

## 2023-10-18 RX ADMIN — Medication 975 MILLIGRAM(S): at 02:49

## 2023-10-18 RX ADMIN — HYDROMORPHONE HYDROCHLORIDE 1 MILLIGRAM(S): 2 INJECTION INTRAMUSCULAR; INTRAVENOUS; SUBCUTANEOUS at 22:44

## 2023-10-18 RX ADMIN — HYDROMORPHONE HYDROCHLORIDE 1 MILLIGRAM(S): 2 INJECTION INTRAMUSCULAR; INTRAVENOUS; SUBCUTANEOUS at 16:21

## 2023-10-18 RX ADMIN — ONDANSETRON 4 MILLIGRAM(S): 8 TABLET, FILM COATED ORAL at 05:15

## 2023-10-18 RX ADMIN — HYDROMORPHONE HYDROCHLORIDE 1 MILLIGRAM(S): 2 INJECTION INTRAMUSCULAR; INTRAVENOUS; SUBCUTANEOUS at 22:14

## 2023-10-18 RX ADMIN — HYDROMORPHONE HYDROCHLORIDE 2 MILLIGRAM(S): 2 INJECTION INTRAMUSCULAR; INTRAVENOUS; SUBCUTANEOUS at 05:15

## 2023-10-18 RX ADMIN — Medication 1 MILLIGRAM(S): at 18:49

## 2023-10-18 RX ADMIN — HEPARIN SODIUM 5000 UNIT(S): 5000 INJECTION INTRAVENOUS; SUBCUTANEOUS at 18:50

## 2023-10-18 RX ADMIN — HYDROMORPHONE HYDROCHLORIDE 1 MILLIGRAM(S): 2 INJECTION INTRAMUSCULAR; INTRAVENOUS; SUBCUTANEOUS at 11:51

## 2023-10-18 RX ADMIN — HYDROMORPHONE HYDROCHLORIDE 2 MILLIGRAM(S): 2 INJECTION INTRAMUSCULAR; INTRAVENOUS; SUBCUTANEOUS at 07:20

## 2023-10-18 RX ADMIN — POLYETHYLENE GLYCOL 3350 17 GRAM(S): 17 POWDER, FOR SOLUTION ORAL at 18:49

## 2023-10-18 RX ADMIN — HYDROMORPHONE HYDROCHLORIDE 1 MILLIGRAM(S): 2 INJECTION INTRAMUSCULAR; INTRAVENOUS; SUBCUTANEOUS at 02:49

## 2023-10-18 RX ADMIN — Medication 650 MILLIGRAM(S): at 15:10

## 2023-10-18 RX ADMIN — HYDROMORPHONE HYDROCHLORIDE 1 MILLIGRAM(S): 2 INJECTION INTRAMUSCULAR; INTRAVENOUS; SUBCUTANEOUS at 13:16

## 2023-10-18 RX ADMIN — LIDOCAINE 1 PATCH: 4 CREAM TOPICAL at 19:44

## 2023-10-18 RX ADMIN — Medication 975 MILLIGRAM(S): at 03:40

## 2023-10-18 RX ADMIN — HYDROMORPHONE HYDROCHLORIDE 1 MILLIGRAM(S): 2 INJECTION INTRAMUSCULAR; INTRAVENOUS; SUBCUTANEOUS at 18:52

## 2023-10-18 RX ADMIN — HYDROMORPHONE HYDROCHLORIDE 1 MILLIGRAM(S): 2 INJECTION INTRAMUSCULAR; INTRAVENOUS; SUBCUTANEOUS at 12:21

## 2023-10-18 RX ADMIN — SODIUM CHLORIDE 150 MILLILITER(S): 9 INJECTION, SOLUTION INTRAVENOUS at 18:59

## 2023-10-18 RX ADMIN — Medication 100 MILLIGRAM(S): at 02:26

## 2023-10-18 RX ADMIN — SODIUM CHLORIDE 150 MILLILITER(S): 9 INJECTION, SOLUTION INTRAVENOUS at 22:17

## 2023-10-18 RX ADMIN — HYDROMORPHONE HYDROCHLORIDE 2 MILLIGRAM(S): 2 INJECTION INTRAMUSCULAR; INTRAVENOUS; SUBCUTANEOUS at 11:40

## 2023-10-18 RX ADMIN — SODIUM CHLORIDE 2700 MILLILITER(S): 9 INJECTION INTRAMUSCULAR; INTRAVENOUS; SUBCUTANEOUS at 02:03

## 2023-10-18 RX ADMIN — HYDROMORPHONE HYDROCHLORIDE 2 MILLIGRAM(S): 2 INJECTION INTRAMUSCULAR; INTRAVENOUS; SUBCUTANEOUS at 06:10

## 2023-10-18 RX ADMIN — HYDROMORPHONE HYDROCHLORIDE 1 MILLIGRAM(S): 2 INJECTION INTRAMUSCULAR; INTRAVENOUS; SUBCUTANEOUS at 15:51

## 2023-10-18 RX ADMIN — Medication 650 MILLIGRAM(S): at 14:40

## 2023-10-18 RX ADMIN — LIDOCAINE 1 PATCH: 4 CREAM TOPICAL at 18:51

## 2023-10-18 RX ADMIN — Medication 100 MILLIGRAM(S): at 14:40

## 2023-10-18 NOTE — ED ADULT NURSE NOTE - NSFALLUNIVINTERV_ED_ALL_ED
Bed/Stretcher in lowest position, wheels locked, appropriate side rails in place/Call bell, personal items and telephone in reach/Instruct patient to call for assistance before getting out of bed/chair/stretcher/Non-slip footwear applied when patient is off stretcher/[place] to call system/Physically safe environment - no spills, clutter or unnecessary equipment/Purposeful proactive rounding/Room/bathroom lighting operational, light cord in reach

## 2023-10-18 NOTE — ED ADULT NURSE NOTE - NS_SISCREENINGSR_GEN_ALL_ED
Problem: Nutritional:  Goal: Achieve adequate nutritional intake  Patient will consume 50% of meals   Outcome: PROGRESSING AS EXPECTED  Pt with PO intake >50% most meals. Continue to monitor weight trends.       Negative

## 2023-10-18 NOTE — CHART NOTE - NSCHARTNOTEFT_GEN_A_CORE
Pt requesting to use chest port rather than peripheral IV line. On previous admissions he has been very set against peripheral needle sticks and went to the point of refusing care if it involved peripheral lines. As port was just changed within last 48 hours, and given balance of clinical indication vs risks, approved using chest port to adminsiter IV fluids at 150 cc/hr, which are necessary for both sickle cell crisis and sepsis treatment. Will try to minimize using the chest port for other things like blood draws.

## 2023-10-18 NOTE — H&P ADULT - PROBLEM SELECTOR PLAN 2
CXR appears reassuring, however it can take viral illness time to manifest on CXR  s/p 2700 cc in ED  s/p aztreonam in ED (allergic to penicillin, CTX, zosyn)  WBC 25, Tmax 102  U/a showing trace LEC and positive nitrites, but no WBC. Pt is also not complaining of urinary ssx and instead has     -f/u full RVP  -supportive care, robutussin, IV fluids as above  -can give tylenol IV for fever control, max 3 g/day given transaminitis CXR appears reassuring, however it can take viral illness time to manifest on CXR  s/p 2700 cc in ED  s/p aztreonam in ED (allergic to penicillin, CTX, zosyn)  WBC 25, Tmax 102  U/a showing trace LEC and positive nitrites, but no WBC. Pt is also not complaining of urinary ssx and instead has ssx more consistent with viral uri    -observe off of abx at this time, if condition does not improve, please add aztreonam (pt is allergic to penicillins, cephalosporin, zosyn)  -f/u full RVP  -supportive care, robutussin, IV fluids as above  -can give tylenol IV for fever control, max 3 g/day given transaminitis Hb at baseline, no current indication for transfusion  s/p 2700 cc fluids in ED  s/p 6 g IV dilaudid, 950 mg tylenol PO, 25 mg IV benadrylx2 in ED    -Heme Onc Dr. Jordan  -pain management was consulted on last admission in 8/2023, given pt's very high opiate tolerance (takes oxycodone 30 mg up to 6x/day at home) and likely hyperalgesia.   -pain regimen: lidocaine patch 4%, dilaudid 2 mg q3h PRN for severe pain, and to titrate down to pt's home dose PO medication regimen  -LR at 150 cc/hr  -supplemental O2 via NC (pt is currently satting on RA but supplemental O2 is helpful during pain crisis)

## 2023-10-18 NOTE — H&P ADULT - HISTORY OF PRESENT ILLNESS
Pt is a 44 yo M w hx sickle cell anemia (baseline Hb 5-6, follows with Dr. Opal Jordan), multiple admissions for pain crisis most recently 8/2023, gout, presenting with complaint of subjective fevers as well as back pain. Pt says that he ran out of home oxycodone 2 weeks ago, and has had abdominal pain since 5 days prior to presentation. 3 days prior to presentation he began to experience subjective fevers, chills, rhinorrhea, cough productive of yellow phlegm that is thick. He says the back pain has gotten worse since then.    On current evaluation, pt complains of lower back pain and abdominal pain. He does not know of any sick contacts. He denies SOB, chest pain, dysuria, diarrhea.  Of note pt has chemo port (not on chemo) which he says was replaced within the last 2 days.

## 2023-10-18 NOTE — ED PROVIDER NOTE - CLINICAL SUMMARY MEDICAL DECISION MAKING FREE TEXT BOX
43-year-old male with history of sickle cell disease presents with body aches and abdominal pain. in ED code sepsis initiated, given empiric abx and IVF.  ekg interpretation- sinus tach  lab interpretation- wbc 25k, H/H 6/18, retic%5.5, lfts elevated, UA cw UTI  imaging interpretation-CXR shows no focal infiltrate  Discussed above with patient, awaiting CT A/P prior to admission  patient signedout to Dr. Quintana at 730am

## 2023-10-18 NOTE — H&P ADULT - PROBLEM SELECTOR PLAN 4
BUN 29/SCr 1.32  Baseline 18/0.9  -s/p 2700 cc fluid bolus in ED    -LR at 150 cc  -avoid nephrotoxins as much as possible

## 2023-10-18 NOTE — H&P ADULT - NSICDXFAMILYHX_GEN_ALL_CORE_FT
FAMILY HISTORY:  Father  Still living? No  Family history of sickle cell trait, Age at diagnosis: Age Unknown  Patient's father is , Age at diagnosis: Age Unknown    Mother  Still living? No  Family history of sickle cell trait, Age at diagnosis: Age Unknown  Patient's mother is , Age at diagnosis: Age Unknown

## 2023-10-18 NOTE — ED PROVIDER NOTE - OBJECTIVE STATEMENT
43-year-old male with history of sickle cell disease presents with body aches and abdominal pain.  Reports that 1 week ago he had cold-like symptoms with tested negative for COVID.  Reports that in the last 2 days he has been having worsening abdominal pain with 1 episode of vomiting and 1 episode of loose stool.  Denies any urinary symptoms.  Denies difficulty breathing, chest pain.  Reports mild productive cough with yellowish sputum.

## 2023-10-18 NOTE — H&P ADULT - ASSESSMENT
44 yo M w sickle cell anemia, gout, admitted for sickle cell crisis and sepsis 42 yo M w sickle cell anemia, gout, admitted for sepsis and sickle cell crisis.

## 2023-10-18 NOTE — ED ADULT NURSE NOTE - OBJECTIVE STATEMENT
Patient A&O x 4 with c/c of generalized pain due to him having sickle cell. Patient states he believes he has an infection because he was really sick for the past few days. Patient currently states he is in severe pain, 10/10, appears jaundiced. Patient currently ambulating in room, no signs of distress noted, all safety measures in place. Will continue to provide care for patient.

## 2023-10-18 NOTE — ED PROVIDER NOTE - PROGRESS NOTE DETAILS
MD Lilian: received signout from Dr. Hairston, 43M c PMH of SSD (+hx of transfusions) p/w body aches, abdominal pain, vomiting, also reported cough with white sputum. no cp. wbc 25, hgb 6.6, retic 5.5. cmp shows transaminitis, UA shows nitrite +, LE, bacteria, concerning for possible uti. lactate wnl. cxr shows stable bibasilar scarring, rvp negative. pt received aztreonam. pt signed out pending CTAP.     CTAP performed, shows: IMPRESSION:  Redemonstrated upper abdominal adenopathy.  no notes of opacities in lungs on ctap.    on assessment, pt awake alert in nad, conversant    d/w admitting attending and MAR who accept pt. pt is reporting more pain so will order pain meds

## 2023-10-18 NOTE — H&P ADULT - PROBLEM SELECTOR PLAN 1
Hb at baseline, no current indication for transfusion  s/p 2700 cc fluids in ED  s/p 6 g IV dilaudid, 950 mg tylenol PO, 25 mg IV benadrylx2 in ED    -Heme Onc Dr. Jordan  -pain management was consulted on last admission in 8/2023, given pt's very high opiate tolerance (takes oxycodone 30 mg up to 6x/day at home) and likely hyperalgesia.   -pain regimen: lidocaine patch 4%, dilaudid 3 mg q3h, and to titrate down to pt's home dose PO medication regimen  -LR at 150 cc/hr  -supplemental O2 via NC (pt is currently satting on RA but supplemental O2 is helpful during pain crisis) Hb at baseline, no current indication for transfusion  s/p 2700 cc fluids in ED  s/p 6 g IV dilaudid, 950 mg tylenol PO, 25 mg IV benadrylx2 in ED    -Heme Onc Dr. Jordan  -pain management was consulted on last admission in 8/2023, given pt's very high opiate tolerance (takes oxycodone 30 mg up to 6x/day at home) and likely hyperalgesia.   -pain regimen: lidocaine patch 4%, dilaudid 2 mg q3h PRN for severe pain, and to titrate down to pt's home dose PO medication regimen  -LR at 150 cc/hr  -supplemental O2 via NC (pt is currently satting on RA but supplemental O2 is helpful during pain crisis) CXR appears reassuring, however it can take viral illness time to manifest on CXR  s/p 2700 cc in ED  s/p aztreonam in ED (allergic to penicillin, CTX, zosyn)  WBC 25, Tmax 102  U/a showing trace LEC and positive nitrites, but no WBC. Pt is also not complaining of urinary ssx and instead has ssx more consistent with viral uri    -observe off of abx at this time, if condition does not improve, please add aztreonam (pt is allergic to penicillins, cephalosporin, zosyn)  -f/u full RVP  -f/u CRP, procal  -supportive care, robutussin, IV fluids as below  -can give tylenol IV for fever control, max 3 g/day given transaminitis CXR appears reassuring, however it can take viral illness time to manifest on CXR  s/p 2700 cc in ED  s/p aztreonam in ED (allergic to penicillin, CTX, zosyn)  WBC 25, Tmax 102  U/a showing trace LEC and positive nitrites, but no WBC. Pt is also not complaining of urinary ssx and instead has ssx more consistent with viral uri    -ID Dr. Carolina  -Aztreonam for now (pt is allergic to penicillins, cephalosporin, zosyn)  -f/u full RVP  -f/u CRP, procal  -supportive care, robutussin, IV fluids as below  -can give tylenol IV for fever control, max 3 g/day given transaminitis

## 2023-10-18 NOTE — H&P ADULT - NSHPPHYSICALEXAM_GEN_ALL_CORE
T(C): 37.2 (10-18-23 @ 12:13), Max: 38.9 (10-17-23 @ 23:36)  HR: 100 (10-18-23 @ 12:13) (94 - 114)  BP: 160/81 (10-18-23 @ 12:13) (136/75 - 164/82)  RR: 16 (10-18-23 @ 12:13) (16 - 19)  SpO2: 94% (10-18-23 @ 12:13) (94% - 96%)    CONSTITUTIONAL: Well groomed, no acute distress, appears very comfortable, smiling    HEENT: normotramuatic, acephalic, PERRL and symmetric, EOMI, No conjunctival or scleral injection, +icteric. Oral mucosa with moist membranes. No external nasal lesions; nasal mucosa not inflamed; no pharyngeal injection or exudates.               Neck: supple, symmetric and without tracheal deviation    RESPIRATORY: No respiratory distress, no use of accessory muscles; CTA b/l, no wheezes, rales or rhonchi    CARDIOVASCULAR: RRRR, +S1S2, no murmurs, no rubs, no gallops; no JVD; no peripheral edema  	Vascular: carotid pulse palpable, radial pulse palpable    GASTROINTESTINAL: +BS, Soft, non tender, non distended, no rebound, no guarding; No palpable masses    MUSCULOSKELETAL: No digital clubbing or cyanosis; examination of the (head/neck, spine/ribs/pelvis, RUE, LUE, RLE, LLE) without misalignment, mild spinal tenderness in middle of lower back    SKIN: No rashes or ulcers noted; no subcutaneous nodules or induration palpable    NEUROLOGIC: sensation intact in upper and lower extremities b/l to light touch; strength appropriate    PSYCHIATRIC: A+O x 3, recent/remote memory intact    LYMPHATIC: No cervical LAD or tenderness    GENITOURINARY: No suprapubic tenderness, no CVA tenderness

## 2023-10-18 NOTE — H&P ADULT - ATTENDING COMMENTS
#Sepsis 2/2 ?pneumonia   #Sickle cell crisis  #Gout   #DVT ppx #Sepsis 2/2 ?pneumonia   #Acute pain 2/2 sickle cell crisis  #MATA  #Gout   #DVT ppx    43yM with PMH of gout and sickle cell anemia, with multiple admissions for sickle cell crisis, who presented with acute onset abdominal and back pain. Admitted for further work up.   Patient seen at bedside. States that he has pain all over his body, primarily going from his lower back down to his feet. Also reports thick, yellow sputum production. Reports that he takes oxycodone 30mg at home when pain is severe, however, he has not had his medication in over two weeks due to issues with his health insurance. ROS positive for subjective fevers, as well as chills that started three days prior to admission. Follows with Dr. Jordan for regular blood transfusions, with baseline hemoglobin 5-6.     Social hx: former tobacco use; rare EtoH use; smokes marijuana daily   FH: sickle cell trait in sibling; parents  2/2 COVID     PE: as above; low  to palpation,   Labs and imaging reviewed by me: CBC, BMP, LFTs, iron studies, UA, CXR, CT abdomen/pelvis     Plan:   -Unclear source of sepsis, WBC 25.59; UA showed moderate bacteria, positive nitrite, and mild leukocyte esterase, however, WBC 4; more suspicious of respiratory illness, as patient reports thick, yellow sputum production  -Continue aztreonam for now, due to patient allergies to CTX, pip/tazo, and PCN; ID consulted   -Pain control, will follow prior admission recs: Dilaudid 2mg Q3h PRN, consider pain management consult if more pain control required   -Heme/onc consulted, patient follows with Dr. Jordan   -Continue IV fluids for sepsis/MATA, s/p 2.7L in ED  -Continue meds for gout  -F/U RVP, procalcitonin, BCx, UCx  -DVT ppx

## 2023-10-19 DIAGNOSIS — F11.20 OPIOID DEPENDENCE, UNCOMPLICATED: ICD-10-CM

## 2023-10-19 DIAGNOSIS — N39.0 URINARY TRACT INFECTION, SITE NOT SPECIFIED: ICD-10-CM

## 2023-10-19 DIAGNOSIS — M25.50 PAIN IN UNSPECIFIED JOINT: ICD-10-CM

## 2023-10-19 LAB
ALBUMIN SERPL ELPH-MCNC: 2.8 G/DL — LOW (ref 3.5–5)
ALBUMIN SERPL ELPH-MCNC: 2.8 G/DL — LOW (ref 3.5–5)
ALP SERPL-CCNC: 96 U/L — SIGNIFICANT CHANGE UP (ref 40–120)
ALP SERPL-CCNC: 96 U/L — SIGNIFICANT CHANGE UP (ref 40–120)
ALT FLD-CCNC: 49 U/L DA — SIGNIFICANT CHANGE UP (ref 10–60)
ALT FLD-CCNC: 49 U/L DA — SIGNIFICANT CHANGE UP (ref 10–60)
ANION GAP SERPL CALC-SCNC: 9 MMOL/L — SIGNIFICANT CHANGE UP (ref 5–17)
ANION GAP SERPL CALC-SCNC: 9 MMOL/L — SIGNIFICANT CHANGE UP (ref 5–17)
AST SERPL-CCNC: 97 U/L — HIGH (ref 10–40)
AST SERPL-CCNC: 97 U/L — HIGH (ref 10–40)
BASOPHILS # BLD AUTO: 0.12 K/UL — SIGNIFICANT CHANGE UP (ref 0–0.2)
BASOPHILS # BLD AUTO: 0.12 K/UL — SIGNIFICANT CHANGE UP (ref 0–0.2)
BASOPHILS NFR BLD AUTO: 0.7 % — SIGNIFICANT CHANGE UP (ref 0–2)
BASOPHILS NFR BLD AUTO: 0.7 % — SIGNIFICANT CHANGE UP (ref 0–2)
BILIRUB SERPL-MCNC: 9.8 MG/DL — HIGH (ref 0.2–1.2)
BILIRUB SERPL-MCNC: 9.8 MG/DL — HIGH (ref 0.2–1.2)
BUN SERPL-MCNC: 24 MG/DL — HIGH (ref 7–18)
BUN SERPL-MCNC: 24 MG/DL — HIGH (ref 7–18)
CALCIUM SERPL-MCNC: 8.3 MG/DL — LOW (ref 8.4–10.5)
CALCIUM SERPL-MCNC: 8.3 MG/DL — LOW (ref 8.4–10.5)
CHLORIDE SERPL-SCNC: 109 MMOL/L — HIGH (ref 96–108)
CHLORIDE SERPL-SCNC: 109 MMOL/L — HIGH (ref 96–108)
CO2 SERPL-SCNC: 22 MMOL/L — SIGNIFICANT CHANGE UP (ref 22–31)
CO2 SERPL-SCNC: 22 MMOL/L — SIGNIFICANT CHANGE UP (ref 22–31)
CREAT SERPL-MCNC: 0.86 MG/DL — SIGNIFICANT CHANGE UP (ref 0.5–1.3)
CREAT SERPL-MCNC: 0.86 MG/DL — SIGNIFICANT CHANGE UP (ref 0.5–1.3)
CRP SERPL-MCNC: 66 MG/L — HIGH
CRP SERPL-MCNC: 66 MG/L — HIGH
CULTURE RESULTS: SIGNIFICANT CHANGE UP
CULTURE RESULTS: SIGNIFICANT CHANGE UP
EGFR: 110 ML/MIN/1.73M2 — SIGNIFICANT CHANGE UP
EGFR: 110 ML/MIN/1.73M2 — SIGNIFICANT CHANGE UP
EOSINOPHIL # BLD AUTO: 0.15 K/UL — SIGNIFICANT CHANGE UP (ref 0–0.5)
EOSINOPHIL # BLD AUTO: 0.15 K/UL — SIGNIFICANT CHANGE UP (ref 0–0.5)
EOSINOPHIL NFR BLD AUTO: 0.8 % — SIGNIFICANT CHANGE UP (ref 0–6)
EOSINOPHIL NFR BLD AUTO: 0.8 % — SIGNIFICANT CHANGE UP (ref 0–6)
GLUCOSE SERPL-MCNC: 95 MG/DL — SIGNIFICANT CHANGE UP (ref 70–99)
GLUCOSE SERPL-MCNC: 95 MG/DL — SIGNIFICANT CHANGE UP (ref 70–99)
HCT VFR BLD CALC: 15.7 % — CRITICAL LOW (ref 39–50)
HCT VFR BLD CALC: 15.7 % — CRITICAL LOW (ref 39–50)
HEMOGLOBIN INTERPRETATION: SIGNIFICANT CHANGE UP
HEMOGLOBIN INTERPRETATION: SIGNIFICANT CHANGE UP
HGB A MFR BLD: 19.9 % — LOW (ref 95.8–98)
HGB A MFR BLD: 19.9 % — LOW (ref 95.8–98)
HGB A2 MFR BLD: 3.1 % — SIGNIFICANT CHANGE UP (ref 2–3.2)
HGB A2 MFR BLD: 3.1 % — SIGNIFICANT CHANGE UP (ref 2–3.2)
HGB BLD-MCNC: 5.4 G/DL — CRITICAL LOW (ref 13–17)
HGB BLD-MCNC: 5.4 G/DL — CRITICAL LOW (ref 13–17)
HGB F MFR BLD: 5.3 % — HIGH (ref 0–1)
HGB F MFR BLD: 5.3 % — HIGH (ref 0–1)
HGB S MFR BLD: 71.7 % — HIGH
HGB S MFR BLD: 71.7 % — HIGH
IMM GRANULOCYTES NFR BLD AUTO: 0.4 % — SIGNIFICANT CHANGE UP (ref 0–0.9)
IMM GRANULOCYTES NFR BLD AUTO: 0.4 % — SIGNIFICANT CHANGE UP (ref 0–0.9)
LYMPHOCYTES # BLD AUTO: 15.5 % — SIGNIFICANT CHANGE UP (ref 13–44)
LYMPHOCYTES # BLD AUTO: 15.5 % — SIGNIFICANT CHANGE UP (ref 13–44)
LYMPHOCYTES # BLD AUTO: 2.79 K/UL — SIGNIFICANT CHANGE UP (ref 1–3.3)
LYMPHOCYTES # BLD AUTO: 2.79 K/UL — SIGNIFICANT CHANGE UP (ref 1–3.3)
MAGNESIUM SERPL-MCNC: 1.7 MG/DL — SIGNIFICANT CHANGE UP (ref 1.6–2.6)
MAGNESIUM SERPL-MCNC: 1.7 MG/DL — SIGNIFICANT CHANGE UP (ref 1.6–2.6)
MCHC RBC-ENTMCNC: 30.7 PG — SIGNIFICANT CHANGE UP (ref 27–34)
MCHC RBC-ENTMCNC: 30.7 PG — SIGNIFICANT CHANGE UP (ref 27–34)
MCHC RBC-ENTMCNC: 34.4 GM/DL — SIGNIFICANT CHANGE UP (ref 32–36)
MCHC RBC-ENTMCNC: 34.4 GM/DL — SIGNIFICANT CHANGE UP (ref 32–36)
MCV RBC AUTO: 89.2 FL — SIGNIFICANT CHANGE UP (ref 80–100)
MCV RBC AUTO: 89.2 FL — SIGNIFICANT CHANGE UP (ref 80–100)
MONOCYTES # BLD AUTO: 1.75 K/UL — HIGH (ref 0–0.9)
MONOCYTES # BLD AUTO: 1.75 K/UL — HIGH (ref 0–0.9)
MONOCYTES NFR BLD AUTO: 9.7 % — SIGNIFICANT CHANGE UP (ref 2–14)
MONOCYTES NFR BLD AUTO: 9.7 % — SIGNIFICANT CHANGE UP (ref 2–14)
NEUTROPHILS # BLD AUTO: 13.1 K/UL — HIGH (ref 1.8–7.4)
NEUTROPHILS # BLD AUTO: 13.1 K/UL — HIGH (ref 1.8–7.4)
NEUTROPHILS NFR BLD AUTO: 72.9 % — SIGNIFICANT CHANGE UP (ref 43–77)
NEUTROPHILS NFR BLD AUTO: 72.9 % — SIGNIFICANT CHANGE UP (ref 43–77)
NRBC # BLD: 0 /100 WBCS — SIGNIFICANT CHANGE UP (ref 0–0)
NRBC # BLD: 0 /100 WBCS — SIGNIFICANT CHANGE UP (ref 0–0)
PHOSPHATE SERPL-MCNC: 2.9 MG/DL — SIGNIFICANT CHANGE UP (ref 2.5–4.5)
PHOSPHATE SERPL-MCNC: 2.9 MG/DL — SIGNIFICANT CHANGE UP (ref 2.5–4.5)
PLATELET # BLD AUTO: 188 K/UL — SIGNIFICANT CHANGE UP (ref 150–400)
PLATELET # BLD AUTO: 188 K/UL — SIGNIFICANT CHANGE UP (ref 150–400)
POTASSIUM SERPL-MCNC: 3.9 MMOL/L — SIGNIFICANT CHANGE UP (ref 3.5–5.3)
POTASSIUM SERPL-MCNC: 3.9 MMOL/L — SIGNIFICANT CHANGE UP (ref 3.5–5.3)
POTASSIUM SERPL-SCNC: 3.9 MMOL/L — SIGNIFICANT CHANGE UP (ref 3.5–5.3)
POTASSIUM SERPL-SCNC: 3.9 MMOL/L — SIGNIFICANT CHANGE UP (ref 3.5–5.3)
PROCALCITONIN SERPL-MCNC: 0.16 NG/ML — HIGH (ref 0.02–0.1)
PROCALCITONIN SERPL-MCNC: 0.16 NG/ML — HIGH (ref 0.02–0.1)
PROT SERPL-MCNC: 6.6 G/DL — SIGNIFICANT CHANGE UP (ref 6–8.3)
PROT SERPL-MCNC: 6.6 G/DL — SIGNIFICANT CHANGE UP (ref 6–8.3)
RBC # BLD: 1.76 M/UL — LOW (ref 4.2–5.8)
RBC # BLD: 1.76 M/UL — LOW (ref 4.2–5.8)
RBC # FLD: 19.7 % — HIGH (ref 10.3–14.5)
RBC # FLD: 19.7 % — HIGH (ref 10.3–14.5)
SODIUM SERPL-SCNC: 140 MMOL/L — SIGNIFICANT CHANGE UP (ref 135–145)
SODIUM SERPL-SCNC: 140 MMOL/L — SIGNIFICANT CHANGE UP (ref 135–145)
SPECIMEN SOURCE: SIGNIFICANT CHANGE UP
SPECIMEN SOURCE: SIGNIFICANT CHANGE UP
WBC # BLD: 17.99 K/UL — HIGH (ref 3.8–10.5)
WBC # BLD: 17.99 K/UL — HIGH (ref 3.8–10.5)
WBC # FLD AUTO: 17.99 K/UL — HIGH (ref 3.8–10.5)
WBC # FLD AUTO: 17.99 K/UL — HIGH (ref 3.8–10.5)

## 2023-10-19 PROCEDURE — 99222 1ST HOSP IP/OBS MODERATE 55: CPT

## 2023-10-19 PROCEDURE — 99232 SBSQ HOSP IP/OBS MODERATE 35: CPT

## 2023-10-19 RX ORDER — PANTOPRAZOLE SODIUM 20 MG/1
40 TABLET, DELAYED RELEASE ORAL
Refills: 0 | Status: DISCONTINUED | OUTPATIENT
Start: 2023-10-19 | End: 2023-10-23

## 2023-10-19 RX ORDER — AZTREONAM 2 G
2000 VIAL (EA) INJECTION ONCE
Refills: 0 | Status: COMPLETED | OUTPATIENT
Start: 2023-10-19 | End: 2023-10-19

## 2023-10-19 RX ORDER — AZTREONAM 2 G
2000 VIAL (EA) INJECTION EVERY 8 HOURS
Refills: 0 | Status: DISCONTINUED | OUTPATIENT
Start: 2023-10-19 | End: 2023-10-19

## 2023-10-19 RX ORDER — ACETAMINOPHEN 500 MG
1000 TABLET ORAL EVERY 8 HOURS
Refills: 0 | Status: DISCONTINUED | OUTPATIENT
Start: 2023-10-19 | End: 2023-10-23

## 2023-10-19 RX ORDER — AZTREONAM 2 G
VIAL (EA) INJECTION
Refills: 0 | Status: DISCONTINUED | OUTPATIENT
Start: 2023-10-19 | End: 2023-10-19

## 2023-10-19 RX ADMIN — SODIUM CHLORIDE 150 MILLILITER(S): 9 INJECTION, SOLUTION INTRAVENOUS at 04:44

## 2023-10-19 RX ADMIN — HYDROMORPHONE HYDROCHLORIDE 2 MILLIGRAM(S): 2 INJECTION INTRAMUSCULAR; INTRAVENOUS; SUBCUTANEOUS at 10:46

## 2023-10-19 RX ADMIN — HYDROMORPHONE HYDROCHLORIDE 2 MILLIGRAM(S): 2 INJECTION INTRAMUSCULAR; INTRAVENOUS; SUBCUTANEOUS at 16:48

## 2023-10-19 RX ADMIN — HYDROMORPHONE HYDROCHLORIDE 2 MILLIGRAM(S): 2 INJECTION INTRAMUSCULAR; INTRAVENOUS; SUBCUTANEOUS at 07:45

## 2023-10-19 RX ADMIN — ONDANSETRON 4 MILLIGRAM(S): 8 TABLET, FILM COATED ORAL at 10:46

## 2023-10-19 RX ADMIN — HYDROMORPHONE HYDROCHLORIDE 2 MILLIGRAM(S): 2 INJECTION INTRAMUSCULAR; INTRAVENOUS; SUBCUTANEOUS at 01:31

## 2023-10-19 RX ADMIN — HYDROMORPHONE HYDROCHLORIDE 2 MILLIGRAM(S): 2 INJECTION INTRAMUSCULAR; INTRAVENOUS; SUBCUTANEOUS at 13:47

## 2023-10-19 RX ADMIN — Medication 650 MILLIGRAM(S): at 02:01

## 2023-10-19 RX ADMIN — HYDROMORPHONE HYDROCHLORIDE 2 MILLIGRAM(S): 2 INJECTION INTRAMUSCULAR; INTRAVENOUS; SUBCUTANEOUS at 04:44

## 2023-10-19 RX ADMIN — Medication 1000 MILLIGRAM(S): at 14:28

## 2023-10-19 RX ADMIN — Medication 650 MILLIGRAM(S): at 01:31

## 2023-10-19 RX ADMIN — Medication 100 MILLIGRAM(S): at 09:35

## 2023-10-19 RX ADMIN — Medication 1000 MILLIGRAM(S): at 22:46

## 2023-10-19 RX ADMIN — HYDROMORPHONE HYDROCHLORIDE 2 MILLIGRAM(S): 2 INJECTION INTRAMUSCULAR; INTRAVENOUS; SUBCUTANEOUS at 05:14

## 2023-10-19 RX ADMIN — HYDROMORPHONE HYDROCHLORIDE 2 MILLIGRAM(S): 2 INJECTION INTRAMUSCULAR; INTRAVENOUS; SUBCUTANEOUS at 17:18

## 2023-10-19 RX ADMIN — HYDROMORPHONE HYDROCHLORIDE 2 MILLIGRAM(S): 2 INJECTION INTRAMUSCULAR; INTRAVENOUS; SUBCUTANEOUS at 14:17

## 2023-10-19 RX ADMIN — HYDROMORPHONE HYDROCHLORIDE 2 MILLIGRAM(S): 2 INJECTION INTRAMUSCULAR; INTRAVENOUS; SUBCUTANEOUS at 08:15

## 2023-10-19 RX ADMIN — HYDROMORPHONE HYDROCHLORIDE 2 MILLIGRAM(S): 2 INJECTION INTRAMUSCULAR; INTRAVENOUS; SUBCUTANEOUS at 02:01

## 2023-10-19 RX ADMIN — HEPARIN SODIUM 5000 UNIT(S): 5000 INJECTION INTRAVENOUS; SUBCUTANEOUS at 17:38

## 2023-10-19 RX ADMIN — Medication 1000 MILLIGRAM(S): at 22:16

## 2023-10-19 RX ADMIN — HYDROMORPHONE HYDROCHLORIDE 2 MILLIGRAM(S): 2 INJECTION INTRAMUSCULAR; INTRAVENOUS; SUBCUTANEOUS at 11:16

## 2023-10-19 RX ADMIN — Medication 1 MILLIGRAM(S): at 17:38

## 2023-10-19 RX ADMIN — HEPARIN SODIUM 5000 UNIT(S): 5000 INJECTION INTRAVENOUS; SUBCUTANEOUS at 06:33

## 2023-10-19 RX ADMIN — LIDOCAINE 1 PATCH: 4 CREAM TOPICAL at 06:34

## 2023-10-19 RX ADMIN — HYDROMORPHONE HYDROCHLORIDE 2 MILLIGRAM(S): 2 INJECTION INTRAMUSCULAR; INTRAVENOUS; SUBCUTANEOUS at 20:18

## 2023-10-19 RX ADMIN — HYDROMORPHONE HYDROCHLORIDE 2 MILLIGRAM(S): 2 INJECTION INTRAMUSCULAR; INTRAVENOUS; SUBCUTANEOUS at 19:48

## 2023-10-19 RX ADMIN — Medication 100 MILLIGRAM(S): at 11:38

## 2023-10-19 RX ADMIN — Medication 1000 MILLIGRAM(S): at 13:58

## 2023-10-19 RX ADMIN — SODIUM CHLORIDE 150 MILLILITER(S): 9 INJECTION, SOLUTION INTRAVENOUS at 09:35

## 2023-10-19 RX ADMIN — HYDROMORPHONE HYDROCHLORIDE 2 MILLIGRAM(S): 2 INJECTION INTRAMUSCULAR; INTRAVENOUS; SUBCUTANEOUS at 23:00

## 2023-10-19 RX ADMIN — HYDROMORPHONE HYDROCHLORIDE 2 MILLIGRAM(S): 2 INJECTION INTRAMUSCULAR; INTRAVENOUS; SUBCUTANEOUS at 23:30

## 2023-10-19 NOTE — CONSULT NOTE ADULT - ASSESSMENT
Mr Downey is a pleasant 43 year old gentleman with history of sickle cell anemia, now with URI with sickle cell pain crisis    1. Sickle cell crisis:   ·	Patient follows with Dr. Jordan from [facility]  ·	Baseline hb of 5  ·	Currently stable.  ·	s/p 1 unit of PRBC transfusion last Thursday  ·	Will continue to follow hb closely.  ·	If it drops further below baselines, can transfuse  ·	If any sign of organ failure, will need more aggressive approach to transfusion/exchange transfusion  ·	Pain management per primary team    Thank you for the opportunity to participate in the care of this patient.  Please don't hesitate to call for any question or concern.  Will continue to follow.

## 2023-10-19 NOTE — PATIENT PROFILE ADULT - FALL HARM RISK - HARM RISK INTERVENTIONS

## 2023-10-19 NOTE — CONSULT NOTE ADULT - SUBJECTIVE AND OBJECTIVE BOX
Reason for consult:    HPI:  Pt is a 42 yo M w hx sickle cell anemia (baseline Hb 5-6, follows with Dr. Opal Jordan), multiple admissions for pain crisis most recently 2023, gout, presenting with complaint of subjective fevers as well as back pain. Pt says that he ran out of home oxycodone 2 weeks ago, and has had abdominal pain since 5 days prior to presentation. 3 days prior to presentation he began to experience subjective fevers, chills, rhinorrhea, cough productive of yellow phlegm that is thick. He says the back pain has gotten worse since then.    On current evaluation, pt complains of lower back pain and abdominal pain. He does not know of any sick contacts. He denies SOB, chest pain, dysuria, diarrhea.  Of note pt has chemo port (not on chemo) which he says was replaced within the last 2 days. (18 Oct 2023 12:16)      PAST MEDICAL & SURGICAL HISTORY:  Sickle cell anemia      Gout      History of cholecystectomy    FAMILY HISTORY:  Family history of sickle cell trait (Father, Mother)    Patient's father is  (Father)    Patient's mother is  (Mother)    Alochol: Denied  Smoking: Nonsmoker  Drug Use: Denied  Marital Status:     Allergies    penicillin (Pruritus)  hydroxyurea (Other)  piperacillin-tazobactam (Urticaria)  ceftriaxone (Anaphylaxis)    Intolerances    MEDICATIONS  (STANDING):  allopurinol 100 milliGRAM(s) Oral daily  aztreonam  IVPB 2000 milliGRAM(s) IV Intermittent every 8 hours  aztreonam  IVPB      folic acid 1 milliGRAM(s) Oral every 24 hours  heparin   Injectable 5000 Unit(s) SubCutaneous every 12 hours  lidocaine   4% Patch 1 Patch Transdermal daily  naloxone Injectable 0.4 milliGRAM(s) IV Push once  polyethylene glycol 3350 17 Gram(s) Oral daily  senna 2 Tablet(s) Oral at bedtime  sodium chloride 0.45%. 1000 milliLiter(s) (150 mL/Hr) IV Continuous <Continuous>    MEDICATIONS  (PRN):  acetaminophen     Tablet .. 650 milliGRAM(s) Oral every 6 hours PRN Temp greater or equal to 38C (100.4F), Mild Pain (1 - 3)  bisacodyl 5 milliGRAM(s) Oral daily PRN Constipation  guaiFENesin Oral Liquid (Sugar-Free) 100 milliGRAM(s) Oral every 6 hours PRN Cough  HYDROmorphone  Injectable 2 milliGRAM(s) IV Push every 3 hours PRN Severe Pain (7 - 10)  ondansetron Injectable 4 milliGRAM(s) IV Push every 8 hours PRN Nausea and/or Vomiting      ROS  14 point ROS negative except for above.    Vital Signs Last 24 Hrs  T(C): 36.7 (19 Oct 2023 04:40), Max: 37.8 (19 Oct 2023 01:21)  T(F): 98.1 (19 Oct 2023 04:40), Max: 100 (19 Oct 2023 01:21)  HR: 94 (19 Oct 2023 04:40) (93 - 107)  BP: 132/77 (19 Oct 2023 04:40) (132/77 - 160/81)  BP(mean): 95 (18 Oct 2023 15:51) (95 - 95)  RR: 17 (19 Oct 2023 04:40) (16 - 18)  SpO2: 96% (19 Oct 2023 04:40) (94% - 100%)    Parameters below as of 19 Oct 2023 04:40  Patient On (Oxygen Delivery Method): nasal cannula  O2 Flow (L/min): 2    PE  NAD  Awake, alert  Anicteric, MMM  RRR  CTAB  Abd soft, NT, ND  No c/c/e  No rash grossly  FROM                          5.4    17.99 )-----------( 188      ( 19 Oct 2023 07:10 )             15.7       10-19    140  |  109<H>  |  24<H>  ----------------------------<  95  3.9   |  22  |  0.86    Ca    8.3<L>      19 Oct 2023 07:10  Phos  2.9     10-  Mg     1.7     10-19    TPro  6.6  /  Alb  2.8<L>  /  TBili  9.8<H>  /  DBili  x   /  AST  97<H>  /  ALT  49  /  AlkPhos  96  10

## 2023-10-19 NOTE — CONSULT NOTE ADULT - TIME BILLING
--chart review including notes, labs, and imaging  --bedside pt evaluation  --discussion of Dx and management and coordination of ID care with the medical team, including Dr. Block.

## 2023-10-19 NOTE — CONSULT NOTE ADULT - ASSESSMENT
42 yo M w hx sickle cell anemia (baseline Hb 5-6, follows with Dr. Opal Jordan), multiple admissions for painful crises most recently 8/2023, gout, presenting with complaint of fevers, rhinorrhea, cough productive of yellow sputum with no SOB, abd pain, N, V and generalized body pain. Pt says that he ran out of home oxycodone 2 weeks ago. Pt met criteria for sepsis on admission and was started on aztreonam. RVP negative. Currently, c/o generalized body pain but is in no distress at the time of my evaluation.    # Sepsis-- pt's clinical presentation likely due to painful crisis rather than infection. However, in view of presence of port, wd cont. AB coverage pending BC results.  --d/c aztreonam  --start levoquine 750mg p.o. qd  --f/u BC results    #Drug allergies-- reportedly with significant allergy to PCN, pip/tazo, and CTX (see above)    #Painful crisis in pt with SSA-- fluid and pain management as per medical team            42 yo M w hx sickle cell anemia (baseline Hb 5-6, follows with Dr. Opal Jordan), multiple admissions for painful crises most recently 8/2023, gout, presenting with complaint of fevers, rhinorrhea, cough productive of yellow sputum with no SOB, abd pain, N, V and generalized body pain. Pt says that he ran out of home oxycodone 2 weeks ago. Pt met criteria for sepsis on admission and was started on aztreonam. RVP negative. Currently, c/o generalized body pain but is in no distress at the time of my evaluation.    # Sepsis-- pt's clinical presentation likely due to painful crisis rather than infection. However, in view of presence of port, wd cont. AB coverage pending BC results.  --d/c aztreonam  --start levoquine 750mg p.o. qd  --f/u BC results    #Drug allergies-- reportedly with significant allergy to PCN, pip/tazo, and CTX (see above)    #Painful crisis in pt with SSA-- fluid and pain management as per medical team    #Conj. icterus-- pt will elevated bili likely related to RBC breakdown from sickle crisis

## 2023-10-19 NOTE — CONSULT NOTE ADULT - ASSESSMENT
Confidential Drug Utilization Report  Search Terms: Alex Downey, 1980Search Date: 10/19/2023 10:15:10 AM  The Drug Utilization Report below displays all of the controlled substance prescriptions, if any, that your patient has filled in the last twelve months. The information displayed on this report is compiled from pharmacy submissions to the Department, and accurately reflects the information as submitted by the pharmacies.    This report was requested by: Lina Cintron | Reference #: [id number]    You have not added a NANCY number. Keeping your NANCY number(s) up to date on the My NANCY # tab will enable the separation of your prescriptions from others in the search results.    Practitioner Count: 2  Pharmacy Count: 1  Current Opioid Prescriptions: 1  Current Benzodiazepine Prescriptions: 0  Current Stimulant Prescriptions: 0      Patient Demographic Information (PDI)       PDI	First Name	Last Name	Birth Date	Gender	Street Address	City	State	Zip Code  DAVID Downey	1980	Male	[phone]	[facility]	[address]    Prescription Information      PDI Filter:    PDI	Current Rx	Drug Type	Rx Written	Rx Dispensed	Drug	Quantity	Days Supply	Prescriber Name	Prescriber NANCY #	Payment Method	Dispenser  A	Y	O	09/14/2023	09/15/2023	oxycodone hcl (ir) 30 mg tab	180	45	Jordan, Shirley SOLITARIO	[id number]	Medicaid	[place]   A	N	O	08/17/2023	08/18/2023	oxycodone hcl (ir) 30 mg tab	180	30	Lanye Boggs	[id number]	Medicaid	[place]   A	N	O	07/20/2023	07/21/2023	oxycodone hcl (ir) 30 mg tab	180	30	Laney Boggs	[id number]	Medicaid	[place]   A	N	O	06/22/2023	06/23/2023	oxycodone hcl (ir) 30 mg tab	180	30	JordanShirley MD	[id number]	Medicaid	[place]   A	N	O	04/20/2023	04/25/2023	oxycodone hcl (ir) 30 mg tab	180	30	Jordan, Shirley SOLITARIO	[id number]	Medicaid	[place]   A	N	O	03/23/2023	03/27/2023	oxycodone hcl (ir) 30 mg tab	180	30	Jordan, Shirley SOLITARIO	[id number]	Insurance	[place]   A	N	O	02/23/2023	02/27/2023	oxycodone hcl (ir) 30 mg tab	180	30	Jordan, Shirley SOLITARIO	[id number]	Insurance	[place]   A	N	O	12/29/2022	01/02/2023	oxycodone hcl (ir) 30 mg tab	180	30	Jag Ayala	[id number]	Insurance	[place]   A	N	O	12/01/2022	12/03/2022	oxycodone hcl (ir) 30 mg tab	180	30	Jordan, Shirley SOLITARIO	[id number]	Insurance	[place]   A	N	O	11/03/2022	11/04/2022	oxycodone hcl (ir) 30 mg tab	180	30	Jordan, Shirley SOLITARIO	[id number]	Insurance	[place]     * - Details of Drug Type : O = Opioid, B = Benzodiazepine, S = Stimulant    * - Drugs marked with an asterisk are compound drugs. If the compound drug is made up of more than one controlled substance, then each controlled substance will be a separate row in the table.

## 2023-10-19 NOTE — PROGRESS NOTE ADULT - NS ATTEND AMEND GEN_ALL_CORE FT
Patient seen at bedside, states he has had pain, and dilaudid is effective but the pain is back 1 hour after the dose. denies SOB.    On exam, patient is mildly distressed from pain, appears to have jaundice, cardiopulmonary exams unremarkable, abdomen soft, NT/ND.\    CBC, BMP, LFT reviewed, WBC 25->17, hgb 6.6->5.4, T-logan 12.1->9.8.    Assessment and plan:  43yM with PMH of gout and sickle cell anemia, with multiple admissions for sickle cell crisis, who presented with acute onset abdominal and back pain. Found to have anemia, elevated bilirubin.    #Sepsis 2/2 ?pneumonia   #Acute pain 2/2 sickle cell crisis  #MATA, resolved  #Gout on allopurinol  #DVT ppx  #Hx of allergy to CTX, pip/tazo, and PCN  - hematology recs appreciated  - pain management recs appreciated, continue dilaudid for now  - ID recs appreciated, switch abx from aztreonam to levofloxacin  - RVP negative, f/u BCx  - daily CBC, BMP, LFT

## 2023-10-19 NOTE — CONSULT NOTE ADULT - PROBLEM SELECTOR RECOMMENDATION 9
Pt with chronic generalized joint pain which is somatic in nature due to sickle cell disease. Pt on chronic opioids- oxycodone 30mg PO 4x/day verified on istop. Retic count 5.5%.    Opioid pain recommendations   - Continue dilaudid 2mg IV q3 hours PRN severe pain. Monitor for sedation/ respiratory depression.   Non-opioid pain recommendations   - Avoid NSAIDs- anemia   - Acetaminophen 1 gram PO q 8 hours PRN moderate pain.  - Pt refused muscle relaxants, lidoderm patch.   Bowel Regimen  - Continue Miralax 17G PO daily  - Continue Senna 2 tablets at bedtime for constipation  - Continue dulcolax 5mg PO daily PRN constipation  Mild pain (score 1-3)  - Non-pharmacological pain treatment recommendations  - Warm/ Cool packs PRN   - Repositioning, imagery, relaxation, distraction.  - Physical therapy OOB if no contraindications   Recommendations discussed with primary team and RN

## 2023-10-19 NOTE — CONSULT NOTE ADULT - SUBJECTIVE AND OBJECTIVE BOX
Source of information: TOLU VARGAS, Chart review  Patient language: English  : n/a    HPI:  Pt is a 44 yo M w hx sickle cell anemia (baseline Hb 5-6, follows with Dr. Opal Jordan), multiple admissions for pain crisis most recently 2023, gout, presenting with complaint of subjective fevers as well as back pain. Pt says that he ran out of home oxycodone 2 weeks ago, and has had abdominal pain since 5 days prior to presentation. 3 days prior to presentation he began to experience subjective fevers, chills, rhinorrhea, cough productive of yellow phlegm that is thick. He says the back pain has gotten worse since then.    On current evaluation, pt complains of lower back pain and abdominal pain. He does not know of any sick contacts. He denies SOB, chest pain, dysuria, diarrhea.  Of note pt has chemo port (not on chemo) which he says was replaced within the last 2 days. (18 Oct 2023 12:16)      Patient is a 43y old  Male who presents with a chief complaint of Sickle cell pain crisis (19 Oct 2023 10:32)  . Pt is admitted for ..., being treated with .... Pain consulted for .... Pt seen and examined at bedside. Reports pain score ***  SCALE USED: (1-10 VNRS). Pt describes pain as .... radiating to... alleviated by pain medication... exacerbated by movement... Pt tolerating PO diet. Denies lethargy, chest pain, SOB, nausea, vomiting, constipation. Reports last BM ***. Patient stated goal for pain control: to be able to take deep breaths, get out of bed to chair and ambulate with tolerable pain control. Pt denies taking medications for pain at home.     PAST MEDICAL & SURGICAL HISTORY:  Sickle cell anemia      Gout      History of cholecystectomy          FAMILY HISTORY:  Family history of sickle cell trait (Father, Mother)    Patient's father is  (Father)    Patient's mother is  (Mother)        Social History:  lives at home, not working (18 Oct 2023 12:16)   [ ] Denies ETOH use, illicit drug use and smoking    Allergies    penicillin (Pruritus)  hydroxyurea (Other)  piperacillin-tazobactam (Urticaria)  ceftriaxone (Anaphylaxis)    Intolerances        MEDICATIONS  (STANDING):  allopurinol 100 milliGRAM(s) Oral daily  folic acid 1 milliGRAM(s) Oral every 24 hours  heparin   Injectable 5000 Unit(s) SubCutaneous every 12 hours  levoFLOXacin  Tablet 750 milliGRAM(s) Oral every 24 hours  lidocaine   4% Patch 1 Patch Transdermal daily  naloxone Injectable 0.4 milliGRAM(s) IV Push once  polyethylene glycol 3350 17 Gram(s) Oral daily  senna 2 Tablet(s) Oral at bedtime  sodium chloride 0.45%. 1000 milliLiter(s) (150 mL/Hr) IV Continuous <Continuous>    MEDICATIONS  (PRN):  acetaminophen     Tablet .. 650 milliGRAM(s) Oral every 6 hours PRN Temp greater or equal to 38C (100.4F), Mild Pain (1 - 3)  bisacodyl 5 milliGRAM(s) Oral daily PRN Constipation  guaiFENesin Oral Liquid (Sugar-Free) 100 milliGRAM(s) Oral every 6 hours PRN Cough  HYDROmorphone  Injectable 2 milliGRAM(s) IV Push every 3 hours PRN Severe Pain (7 - 10)  ondansetron Injectable 4 milliGRAM(s) IV Push every 8 hours PRN Nausea and/or Vomiting      Vital Signs Last 24 Hrs  T(C): 36.7 (19 Oct 2023 04:40), Max: 37.8 (19 Oct 2023 01:21)  T(F): 98.1 (19 Oct 2023 04:40), Max: 100 (19 Oct 2023 01:21)  HR: 94 (19 Oct 2023 04:40) (93 - 107)  BP: 132/77 (19 Oct 2023 04:40) (132/77 - 160/81)  BP(mean): 95 (18 Oct 2023 15:51) (95 - 95)  RR: 17 (19 Oct 2023 04:40) (16 - 18)  SpO2: 96% (19 Oct 2023 04:40) (94% - 100%)    Parameters below as of 19 Oct 2023 04:40  Patient On (Oxygen Delivery Method): nasal cannula  O2 Flow (L/min): 2      LABS: Reviewed.                          5.4    17.99 )-----------( 188      ( 19 Oct 2023 07:10 )             15.7     10-19    140  |  109<H>  |  24<H>  ----------------------------<  95  3.9   |  22  |  0.86    Ca    8.3<L>      19 Oct 2023 07:10  Phos  2.9     10-19  Mg     1.7     10-19    TPro  6.6  /  Alb  2.8<L>  /  TBili  9.8<H>  /  DBili  x   /  AST  97<H>  /  ALT  49  /  AlkPhos  96  10-19    PT/INR - ( 18 Oct 2023 00:56 )   PT: 14.2 sec;   INR: 1.25 ratio         PTT - ( 18 Oct 2023 00:56 )  PTT:45.0 sec  LIVER FUNCTIONS - ( 19 Oct 2023 07:10 )  Alb: 2.8 g/dL / Pro: 6.6 g/dL / ALK PHOS: 96 U/L / ALT: 49 U/L DA / AST: 97 U/L / GGT: x           Urinalysis Basic - ( 19 Oct 2023 07:10 )    Color: x / Appearance: x / SG: x / pH: x  Gluc: 95 mg/dL / Ketone: x  / Bili: x / Urobili: x   Blood: x / Protein: x / Nitrite: x   Leuk Esterase: x / RBC: x / WBC x   Sq Epi: x / Non Sq Epi: x / Bacteria: x      CAPILLARY BLOOD GLUCOSE        SARS-CoV-2: NotDetec (13 Oct 2023 00:57)      Radiology: Reviewed.     ORT Score -   Family Hx of substance abuse	Female	      Male  Alcohol 	                                           1                     3  Illegal drugs	                                   2                     3  Rx drugs                                           4 	                  4  Personal Hx of substance abuse		  Alcohol 	                                          3	                  3  Illegal drugs                                     4	                  4  Rx drugs                                            5 	                  5  Age between 16- 45 years	           1                     1  hx preadolescent sexual abuse	   3 	                  0  Psychological disease		  ADD, OCD, bipolar, schizophrenia   2	          2  Depression                                           1 	          1  Total: 0    a score of 3 or lower indicates low risk for opioid abuse		  a score of 4-7 indicates moderate risk for opioid abuse		  a score of 8 or higher indicates high risk for opioid abuse  	  4AT (Assessment test for delirium & cognitive impairment)  _________________________________________________________  [1] ALERTNESS  This includes patients who may be markedly drowsy (eg. difficult to rouse and/or obviously sleepy  during assessment) or agitated/hyperactive. Observe the patient. If asleep, attempt to wake with  speech or gentle touch on shoulder. Ask the patient to state their name and address to assist rating.  Normal (fully alert, but not agitated, throughout assessment) 0  Mild sleepiness for <10 seconds after waking, then normal 0  Clearly abnormal 4    [2] AMT4  Age, date of birth, place (name of the hospital or building), current year.  No mistakes 0  1 mistake 1  2 or more mistakes/untestable 2    [3] ATTENTION  Ask the patient: “Please tell me the months of the year in backwards order, starting at December.”  To assist initial understanding one prompt of “what is the month before December?” is permitted.  Months of the year backwards Achieves 7 months or more correctly 0  Starts but scores <7 months / refuses to start 1   Untestable (cannot start because unwell, drowsy, inattentive) 2    [4] ACUTE CHANGE OR FLUCTUATING COURSE  Evidence of significant change or fluctuation in: alertness, cognition, other mental function  (eg. paranoia, hallucinations) arising over the last 2 weeks and still evident in last 24hrs  No 0  Yes 4    4 or above: possible delirium +/- cognitive impairment  1-3: possible cognitive impairment  0: delirium or severe cognitive impairment unlikely (but delirium still possible if [4] information incomplete)    4AT SCORE: 0    REVIEW OF SYSTEMS:  CONSTITUTIONAL: No fever or fatigue  HEENT:  No difficulty hearing, no change in vision  NECK: No pain or stiffness  RESPIRATORY: No cough, wheezing, chills or hemoptysis; No shortness of breath  CARDIOVASCULAR: No chest pain, palpitations, dizziness, or leg swelling  GASTROINTESTINAL: No loss of appetite, decreased PO intake. No abdominal or epigastric pain. No nausea, vomiting; No diarrhea or constipation.   GENITOURINARY: No dysuria, frequency, hematuria, retention or incontinence  MUSCULOSKELETAL: No joint pain or swelling; No muscle, back, or extremity pain, no upper or lower motor strength weakness, no saddle anesthesia, bowel/bladder incontinence, no falls   NEURO: No headaches, No numbness/tingling b/l LE, No weakness  ENDOCRINE: No polyuria, polydipsia, heat or cold intolerance; No hair loss  PSYCHIATRIC: No depression, anxiety or difficulty sleeping    PHYSICAL EXAM:  GENERAL:  Alert & Oriented X4, cooperative, NAD, Good concentration. Speech is clear.   RESPIRATORY: Respirations even and unlabored. Clear to auscultation bilaterally; No rales, rhonchi, wheezing, or rubs  CARDIOVASCULAR: Normal S1/S2, regular rate and rhythm; No murmurs, rubs, or gallops. No JVD.   GASTROINTESTINAL:  Soft, Nontender, Nondistended; Bowel sounds present  PERIPHERAL VASCULAR:  Extremities warm without edema. 2+ Peripheral Pulses, No cyanosis, No calf tenderness  MUSCULOSKELETAL: Motor Strength 5/5 B/L upper and lower extremities; moves all extremities equally against gravity; ROM intact; negative SLR; No tenderness on palpation of all joints.   SKIN: Warm, dry, intact. No rashes, lesions, scars or wounds.     Risk factors associated with adverse outcomes related to opioid treatment  [ ]  Concurrent benzodiazepine use  [ ]  History/ Active substance use or alcohol use disorder  [ ] Psychiatric co-morbidity  [ ] Sleep apnea  [ ] COPD  [ ] BMI> 35  [ ] Liver dysfunction  [ ] Renal dysfunction  [ ] CHF  [ ] Smoker  [ ]  Age > 60 years    [X ]  [place]  Reviewed and Copied to Chart. See below.    Plan of care and goal oriented pain management treatment options were discussed with patient and /or primary care giver; all questions and concerns were addressed and care was aligned with patient's wishes.    Educated patient on goal oriented pain management treatment options        Source of information: TOLU VARGAS, Chart review  Patient language: English  : n/a    HPI:  Pt is a 42 yo M w hx sickle cell anemia (baseline Hb 5-6, follows with Dr. Opal Jordan), multiple admissions for pain crisis most recently 2023, gout, presenting with complaint of subjective fevers as well as back pain. Pt says that he ran out of home oxycodone 2 weeks ago, and has had abdominal pain since 5 days prior to presentation. 3 days prior to presentation he began to experience subjective fevers, chills, rhinorrhea, cough productive of yellow phlegm that is thick. He says the back pain has gotten worse since then.    On current evaluation, pt complains of lower back pain and abdominal pain. He does not know of any sick contacts. He denies SOB, chest pain, dysuria, diarrhea.  Of note pt has chemo port (not on chemo) which he says was replaced within the last 2 days. (18 Oct 2023 12:16)    Pt is admitted for sickle cell pain crisis, Retic count 5.5 %. Pain is well known to pain service. Last seen by pain management in 2023. Pain consulted for generalized joint pain 10/19. Pt seen and examined at bedside. Pt laying in bed, no acute distress. Reports generalized joint pain score 8/10 SCALE USED: (1-10 VNRS). Pt describes pain as sharp, greatest over b/l hips, lumbar back and feet. Also reports b/l rib pain, knee pain. Pain is non-radiating, alleviated by IV dilaudid pain medication, exacerbated by movement. Requesting increase in IV opioid pain medication. Pt is opioid dependent. On oxycodone 30mg PO 4x/day (verified on istop). Reports taking on average 1-2 tabs oxycodone 30mg PO/ day. Pt tolerating PO diet. Denies lethargy, chest pain, SOB, nausea, vomiting, constipation. Reports last BM 10/19. Patient stated goal for pain control: to be able to take deep breaths, get out of bed to chair and ambulate with tolerable pain control.     PAST MEDICAL & SURGICAL HISTORY:  Sickle cell anemia      Gout      History of cholecystectomy          FAMILY HISTORY:  Family history of sickle cell trait (Father, Mother)    Patient's father is  (Father)    Patient's mother is  (Mother)        Social History:  lives at home, not working (18 Oct 2023 12:16)   [X ] Denies ETOH use, illicit drug use   [x] hx marijuana smoking    Allergies    penicillin (Pruritus)  hydroxyurea (Other)  piperacillin-tazobactam (Urticaria)  ceftriaxone (Anaphylaxis)    MEDICATIONS  (STANDING):  allopurinol 100 milliGRAM(s) Oral daily  folic acid 1 milliGRAM(s) Oral every 24 hours  heparin   Injectable 5000 Unit(s) SubCutaneous every 12 hours  levoFLOXacin  Tablet 750 milliGRAM(s) Oral every 24 hours  lidocaine   4% Patch 1 Patch Transdermal daily  naloxone Injectable 0.4 milliGRAM(s) IV Push once  polyethylene glycol 3350 17 Gram(s) Oral daily  senna 2 Tablet(s) Oral at bedtime  sodium chloride 0.45%. 1000 milliLiter(s) (150 mL/Hr) IV Continuous <Continuous>    MEDICATIONS  (PRN):  acetaminophen     Tablet .. 650 milliGRAM(s) Oral every 6 hours PRN Temp greater or equal to 38C (100.4F), Mild Pain (1 - 3)  bisacodyl 5 milliGRAM(s) Oral daily PRN Constipation  guaiFENesin Oral Liquid (Sugar-Free) 100 milliGRAM(s) Oral every 6 hours PRN Cough  HYDROmorphone  Injectable 2 milliGRAM(s) IV Push every 3 hours PRN Severe Pain (7 - 10)  ondansetron Injectable 4 milliGRAM(s) IV Push every 8 hours PRN Nausea and/or Vomiting      Vital Signs Last 24 Hrs  T(C): 36.7 (19 Oct 2023 04:40), Max: 37.8 (19 Oct 2023 01:21)  T(F): 98.1 (19 Oct 2023 04:40), Max: 100 (19 Oct 2023 01:21)  HR: 94 (19 Oct 2023 04:40) (93 - 107)  BP: 132/77 (19 Oct 2023 04:40) (132/77 - 160/81)  BP(mean): 95 (18 Oct 2023 15:51) (95 - 95)  RR: 17 (19 Oct 2023 04:40) (16 - 18)  SpO2: 96% (19 Oct 2023 04:40) (94% - 100%)    Parameters below as of 19 Oct 2023 04:40  Patient On (Oxygen Delivery Method): nasal cannula  O2 Flow (L/min): 2      LABS: Reviewed.                          5.4    17.99 )-----------( 188      ( 19 Oct 2023 07:10 )             15.7     10-19    140  |  109<H>  |  24<H>  ----------------------------<  95  3.9   |  22  |  0.86    Ca    8.3<L>      19 Oct 2023 07:10  Phos  2.9     10-19  Mg     1.7     10-19    TPro  6.6  /  Alb  2.8<L>  /  TBili  9.8<H>  /  DBili  x   /  AST  97<H>  /  ALT  49  /  AlkPhos  96  10-19    PT/INR - ( 18 Oct 2023 00:56 )   PT: 14.2 sec;   INR: 1.25 ratio         PTT - ( 18 Oct 2023 00:56 )  PTT:45.0 sec  LIVER FUNCTIONS - ( 19 Oct 2023 07:10 )  Alb: 2.8 g/dL / Pro: 6.6 g/dL / ALK PHOS: 96 U/L / ALT: 49 U/L DA / AST: 97 U/L / GGT: x           Urinalysis Basic - ( 19 Oct 2023 07:10 )    Color: x / Appearance: x / SG: x / pH: x  Gluc: 95 mg/dL / Ketone: x  / Bili: x / Urobili: x   Blood: x / Protein: x / Nitrite: x   Leuk Esterase: x / RBC: x / WBC x   Sq Epi: x / Non Sq Epi: x / Bacteria: x    SARS-CoV-2: NotDetec (13 Oct 2023 00:57)    Radiology: Reviewed.   < from: CT Abdomen and Pelvis w/ IV Cont (10.18.23 @ 10:27) >  ACC: [id number] EXAM:  CT ABDOMEN AND PELVIS IC   ORDERED BY: FÁTIMA WETZEL     PROCEDURE DATE:  10/18/2023          INTERPRETATION:  CLINICAL INFORMATION: Abdominal pain and sepsis    COMPARISON: 2022    CONTRAST/COMPLICATIONS:  IV Contrast: Omnipaque 350  90 cc administered   10 cc discarded  Oral Contrast: NONE  Complications: None reported at time of study completion    PROCEDURE:  CT of the Abdomen and Pelvis was performed.  Sagittal and coronal reformats were performed.    FINDINGS:  LOWER CHEST: Basilar atelectasis/scarring. Cardiomegaly. Mildly enlarged   mediastinal and cardiophrenic angle lymph nodes, similar to prior.    LIVER: Mild nodular contour.  BILE DUCTS: Normal caliber.  GALLBLADDER: Cholecystectomy.  SPLEEN: Atrophic and calcified spleen.  PANCREAS: Within normal limits.  ADRENALS: Within normal limits.  KIDNEYS/URETERS: Cyst and too small to further characterize   hypodensities. No hydronephrosis. Duplex bilateral renal collecting   systems.    BLADDER: Within normal limits.  REPRODUCTIVE ORGANS: Prostate within normal limits.    BOWEL: No bowel obstruction. Appendix is within normal limits.  PERITONEUM: Trace pelvic fluid.  VESSELS: Atherosclerotic changes.  RETROPERITONEUM/LYMPH NODES: Enlarged upper abdominal gastrohepatic,   portacaval, patrick hepatis, and peripancreatic lymph nodes, similar to   prior.  ABDOMINAL WALL: Fat-containing left inguinal hernia.  BONES: Degenerative changes. Stable wedge compression deformity of T12   vertebral body. Stable mild vertebral body height loss of L2.    IMPRESSION:  Redemonstrated upper abdominal adenopathy.        --- End of Report ---            MEKHI ADHIKARI MD; Attending Radiologist  This document has been electronically signed. Oct 18 2023 10:42AM    < end of copied text >    < from: Xray Chest 1 View-PORTABLE IMMEDIATE (Xray Chest 1 View-PORTABLE IMMEDIATE .) (10.18.23 @ 04:44) >    ACC: [id number] EXAM:  XR CHEST PORTABLE IMMED 1V   ORDERED BY: FÁTIMA WETZEL     PROCEDURE DATE:  10/18/2023          INTERPRETATION:  AP chest on 2023 at 4:33 AM. Patient has   sepsis.    Heart likely enlarged. Right-sided port again noted.    There is a persistent linear density at the right base and several others   at the left base.    Chest is similar to May 19 is year.    IMPRESSION: Bibasilar linear densities are stable as is the chest.    --- End of Report ---            RAMO WERNER MD; Attending Radiologist  This document has been electronically signed. Oct 18 2023 10:58AM    < end of copied text >      ORT Score -   Family Hx of substance abuse	Female	      Male  Alcohol 	                                           1                     3  Illegal drugs	                                   2                     3  Rx drugs                                           4 	                  4  Personal Hx of substance abuse		  Alcohol 	                                          3	                  3  Illegal drugs                                     4	                  4  Rx drugs                                            5 	                  5  Age between 16- 45 years	           1                     1  hx preadolescent sexual abuse	   3 	                  0  Psychological disease		  ADD, OCD, bipolar, schizophrenia   2	          2  Depression                                           1 	          1  Total: 1    a score of 3 or lower indicates low risk for opioid abuse		  a score of 4-7 indicates moderate risk for opioid abuse		  a score of 8 or higher indicates high risk for opioid abuse    REVIEW OF SYSTEMS:  CONSTITUTIONAL: No fever No fatigue  HEENT:  No difficulty hearing, no change in vision  NECK: No pain or stiffness  RESPIRATORY: No cough, wheezing, chills or hemoptysis; No shortness of breath  CARDIOVASCULAR: No chest pain, palpitations, dizziness, or leg swelling  GASTROINTESTINAL: No loss of appetite, decreased PO intake. No abdominal or epigastric pain. No nausea, vomiting; No diarrhea or constipation.   GENITOURINARY: No dysuria, frequency, hematuria, retention or incontinence  MUSCULOSKELETAL: + hx gout + generalized joint pain, greatest over b/l hips, lumbar back, feet. Also with b/l rib, knee pain. + chronic back and joint pain; + b/l ankle swelling; no upper or lower motor strength weakness, no saddle anesthesia, bowel/bladder incontinence, no falls   NEURO: No headaches, No numbness/tingling b/l LE, No weakness    PHYSICAL EXAM:  GENERAL:  Alert & Oriented X4, cooperative, NAD, Good concentration. Speech is clear.   RESPIRATORY: Respirations even and unlabored. Clear to auscultation bilaterally; No rales, rhonchi, wheezing, or rubs  CARDIOVASCULAR: Normal S1/S2, regular rate and rhythm; No murmurs, rubs, or gallops. No JVD.   GASTROINTESTINAL:  Soft, Nontender, Nondistended; Bowel sounds present  PERIPHERAL VASCULAR:  Extremities warm with b/l ankle edema. 2+ Peripheral Pulses, No cyanosis, No calf tenderness  MUSCULOSKELETAL: Motor Strength 5/5 B/L upper and lower extremities; moves all extremities equally against gravity; ROM intact; negative SLR; + generalized tenderness on palpation of lumbar back, hip, knees, feet.  SKIN: Warm, dry, intact. No rashes, lesions, scars or wounds.     Risk factors associated with adverse outcomes related to opioid treatment  [ ]  Concurrent benzodiazepine use  [ ]  History/ Active substance use or alcohol use disorder  [ ] Psychiatric co-morbidity  [ ] Sleep apnea  [ ] COPD  [ ] BMI> 35  [ ] Liver dysfunction  [ ] Renal dysfunction  [ ] CHF  [ ] Smoker  [ ]  Age > 60 years    [X ]  [place]  Reviewed and Copied to Chart. See below.    Plan of care and goal oriented pain management treatment options were discussed with patient and /or primary care giver; all questions and concerns were addressed and care was aligned with patient's wishes.    Educated patient on goal oriented pain management treatment options

## 2023-10-19 NOTE — CONSULT NOTE ADULT - SUBJECTIVE AND OBJECTIVE BOX
HPI:  Pt is a 44 yo M w hx sickle cell anemia (baseline Hb 5-6, follows with Dr. Opal Jordan), multiple admissions for pain crisis most recently 2023, gout, presenting with complaint of subjective fevers as well as back pain. Pt says that he ran out of home oxycodone 2 weeks ago, and has had abdominal pain since 5 days prior to presentation. 3 days prior to presentation he began to experience subjective fevers, chills, rhinorrhea, cough productive of yellow phlegm that is thick. He says the back pain has gotten worse since then.    On current evaluation, pt complains of lower back pain and abdominal pain. He does not know of any sick contacts. He denies SOB, chest pain, dysuria, diarrhea.  Of note pt has chemo port (not on chemo) which he says was replaced within the last 2 days. (18 Oct 2023 12:16)      PAST MEDICAL & SURGICAL HISTORY:  Sickle cell anemia      Gout      History of cholecystectomy          MEDS:  acetaminophen     Tablet .. 650 milliGRAM(s) Oral every 6 hours PRN  allopurinol 100 milliGRAM(s) Oral daily  aztreonam  IVPB 2000 milliGRAM(s) IV Intermittent every 8 hours (D2)  aztreonam  IVPB      bisacodyl 5 milliGRAM(s) Oral daily PRN  folic acid 1 milliGRAM(s) Oral every 24 hours  guaiFENesin Oral Liquid (Sugar-Free) 100 milliGRAM(s) Oral every 6 hours PRN  heparin   Injectable 5000 Unit(s) SubCutaneous every 12 hours  HYDROmorphone  Injectable 1 milliGRAM(s) IV Push every 3 hours PRN  HYDROmorphone  Injectable 2 milliGRAM(s) IV Push every 3 hours PRN  lidocaine   4% Patch 1 Patch Transdermal daily  naloxone Injectable 0.4 milliGRAM(s) IV Push once  ondansetron Injectable 4 milliGRAM(s) IV Push every 8 hours PRN  polyethylene glycol 3350 17 Gram(s) Oral daily  senna 2 Tablet(s) Oral at bedtime  sodium chloride 0.45%. 1000 milliLiter(s) IV Continuous <Continuous>      ALLERGIES  penicillin (Pruritus)  hydroxyurea (Other)  piperacillin-tazobactam (Urticaria)  ceftriaxone (Anaphylaxis)      SOCIAL HISTORY:    FAMILY HISTORY:  Family history of sickle cell trait (Father, Mother)    Patient's father is  (Father)    Patient's mother is  (Mother)        ROS:    General:	    Skin:  	  HEENT:    Respiratory and Thorax:  	  Cardiovascular:	    Abdomen:	    Genitourinary:	    Musculoskeletal:	    Neurological:	    Psychiatric:	    Hematology/Lymphatics:	    Endocrine:	    PHYSICAL EXAM:    Vital Signs Last 24 Hrs  T(C): 36.7 (19 Oct 2023 04:40), Max: 37.8 (19 Oct 2023 01:21)  T(F): 98.1 (19 Oct 2023 04:40), Max: 100 (19 Oct 2023 01:21)  HR: 94 (19 Oct 2023 04:40) (93 - 107)  BP: 132/77 (19 Oct 2023 04:40) (132/77 - 160/81)  BP(mean): 95 (18 Oct 2023 15:51) (95 - 95)  RR: 17 (19 Oct 2023 04:40) (16 - 18)  SpO2: 96% (19 Oct 2023 04:40) (94% - 100%)    Parameters below as of 19 Oct 2023 04:40  Patient On (Oxygen Delivery Method): nasal cannula  O2 Flow (L/min): 2        Gen:    HEENT:    Neck:    Lymph Nodes:    Breasts:    Back:    Chest/Thorax:    Cardiovascular:    Gastrointestinal:    Genitourinary:    Rectal:    Extremities:    Vascular:    Neurological:    Skin:    Psychiatric:    LABS/DIAGNOSTIC TESTS:                          5.4    17.99 )-----------( 188      ( 19 Oct 2023 07:10 )             15.7     WBC Count: 17.99 K/uL (10-19 @ 07:10)  WBC Count: 25.59 K/uL (10-18 @ 00:56)      10-19    140  |  109<H>  |  24<H>  ----------------------------<  95  3.9   |  22  |  0.86    Ca    8.3<L>      19 Oct 2023 07:10  Phos  2.9     10-19  Mg     1.7     10-19    TPro  6.6  /  Alb  2.8<L>  /  TBili  9.8<H>  /  DBili  x   /  AST  97<H>  /  ALT  49  /  AlkPhos  96  10-19      Urine Microscopic-Add On (NC) (10.18.23 @ 04:10)   Granular Cast: Present  Red Blood Cell - Urine: 25 /HPF  White Blood Cell - Urine: 4 /HPF  Bacteria: Moderate /HPF  Comment - Urine: few amorphous urates  Squamous Epithelial Cells: Present    LIVER FUNCTIONS - ( 19 Oct 2023 07:10 )  Alb: 2.8 g/dL / Pro: 6.6 g/dL / ALK PHOS: 96 U/L / ALT: 49 U/L DA / AST: 97 U/L / GGT: x             PT/INR - ( 18 Oct 2023 00:56 )   PT: 14.2 sec;   INR: 1.25 ratio         PTT - ( 18 Oct 2023 00:56 )  PTT:45.0 sec    LACTATE: Lactate, Blood (10.18.23 @ 00:56)   Lactate, Blood: 1.0 mmol/L      Respiratory Viral Panel with COVID-19 by PABLO (10.13.23 @ 00:57)   Rapid RVP Result: NotDetec  SARS-CoV-2: NotDetec: This Respiratory Panel uses polymerase chain reaction (PCR) to detect for   adenovirus; coronavirus (HKU1, NL63, 229E, OC43); human metapneumovirus   (hMPV); human enterovirus/rhinovirus (Entero/RV); influenza A; influenza   A/H1; influenza A/H3; influenza A/H1-2009; influenza B; parainfluenza   viruses 1, 2, 3, 4; respiratory syncytial virus; Mycoplasma pneumoniae;   Chlamydophila pneumoniae; and SARS-CoV-2.  Adenovirus (RapRVP): NotDetec  Influenza A (RapRVP): NotDetec  Influenza AH1 2009 (RapRVP): NotDetec  Influenza AH1 (RapRVP): NotDetec  Influenza AH3 (RapRVP): NotDetec  Influenza B (RapRVP): NotDetec  Parainfluenza 1 (RapRVP): NotDetec  Parainfluenza 2 (RapRVP): NotDetec  Parainfluenza 3 (RapRVP): NotDetec  Parainfluenza 4 (RapRVP): NotDetec  Resp Syncytial Virus (RapRVP): NotDetec  Chlamydia pneumoniae (RapRVP): NotDetec  Mycoplasma pneumoniae (RapRVP): NotDetec  Entero/Rhinovirus (RapRVP): NotDetec  hMPV (RapRVP): NotDetec  Coronavirus (229E,HKU1,NL63,OC43): NotDetec    CULTURES:     Culture - Urine (collected 10-18-23 @ 04:10)  Source: Clean Catch Clean Catch (Midstream)  Final Report (10-19-23 @ 08:26):    <10,000 CFU/mL Normal Urogenital Nat        RADIOLOGY  < from: CT Abdomen and Pelvis w/ IV Cont (10.18.23 @ 10:27) >  ACC: [id number] EXAM:  CT ABDOMEN AND PELVIS IC   ORDERED BY: FÁTIMA WETZEL     PROCEDURE DATE:  10/18/2023          INTERPRETATION:  CLINICAL INFORMATION: Abdominal pain and sepsis    COMPARISON: 2022    CONTRAST/COMPLICATIONS:  IV Contrast: Omnipaque 350  90 cc administered   10 cc discarded  Oral Contrast: NONE  Complications: None reported at time of study completion    PROCEDURE:  CT of the Abdomen and Pelvis was performed.  Sagittal and coronal reformats were performed.    FINDINGS:  LOWER CHEST: Basilar atelectasis/scarring. Cardiomegaly. Mildly enlarged   mediastinal and cardiophrenic angle lymph nodes, similar to prior.    LIVER: Mild nodular contour.  BILE DUCTS: Normal caliber.  GALLBLADDER: Cholecystectomy.  SPLEEN: Atrophic and calcified spleen.  PANCREAS: Within normal limits.  ADRENALS: Within normal limits.  KIDNEYS/URETERS: Cyst and too small to further characterize   hypodensities. No hydronephrosis. Duplex bilateral renal collecting   systems.    BLADDER: Within normal limits.  REPRODUCTIVE ORGANS: Prostate within normal limits.    BOWEL: No bowel obstruction. Appendix is within normal limits.  PERITONEUM: Trace pelvic fluid.  VESSELS: Atherosclerotic changes.  RETROPERITONEUM/LYMPH NODES: Enlarged upper abdominal gastrohepatic,   portacaval, patrick hepatis, and peripancreatic lymph nodes, similar to   prior.  ABDOMINAL WALL: Fat-containing left inguinal hernia.  BONES: Degenerative changes. Stable wedge compression deformity of T12   vertebral body. Stable mild vertebral body height loss of L2.    IMPRESSION:  Redemonstrated upper abdominal adenopathy.      ___________________________________________________________________________________________________________________  < from: Xray Chest 1 View-PORTABLE IMMEDIATE (Xray Chest 1 View-PORTABLE IMMEDIATE .) (10.18.23 @ 04:44) >  ACC: [id number] EXAM:  XR CHEST PORTABLE IMMED 1V   ORDERED BY: FÁTIMA WETZEL     PROCEDURE DATE:  10/18/2023          INTERPRETATION:  AP chest on 2023 at 4:33 AM. Patient has   sepsis.    Heart likely enlarged. Right-sided port again noted.    There is a persistent linear density at the right base and several others   at the left base.    Chest is similar to May 19 is year.    IMPRESSION: Bibasilar linear densities are stable as is the chest.      < end of copied text >                 HPI:  Pt is a 44 yo M w hx sickle cell anemia (baseline Hb 5-6, follows with Dr. Opal Jordan), multiple admissions for painful crises most recently 2023, gout, presenting with complaint of fever as well as back pain. Pt says that he ran out of home oxycodone 2 weeks ago, and has had abdominal pain since 5 days prior to presentation. 3 days prior to presentation, he began to experience subjective fevers, chills, rhinorrhea, cough productive of yellow phlegm that is thick. He says the back pain has gotten worse since then. He does not know of any sick contacts. He denies SOB, chest pain, dysuria, diarrhea.     At the time of my consult, pt c/o generalized pain and subjective fever as well as multiple episodes of N, V PTA.  Says his temp was 100 PTA. Pt states he has allergy to multiple drugs. Has been having loose stool in the hospital which he attributes to stool softener. Of note pt has R upper chest port (not on chemo) for the past 8y.      PAST MEDICAL & SURGICAL HISTORY:  Sickle cell anemia      Gout      History of cholecystectomy          MEDS:  acetaminophen     Tablet .. 650 milliGRAM(s) Oral every 6 hours PRN  allopurinol 100 milliGRAM(s) Oral daily  aztreonam  IVPB 2000 milliGRAM(s) IV Intermittent every 8 hours (D2)  aztreonam  IVPB      bisacodyl 5 milliGRAM(s) Oral daily PRN  folic acid 1 milliGRAM(s) Oral every 24 hours  guaiFENesin Oral Liquid (Sugar-Free) 100 milliGRAM(s) Oral every 6 hours PRN  heparin   Injectable 5000 Unit(s) SubCutaneous every 12 hours  HYDROmorphone  Injectable 1 milliGRAM(s) IV Push every 3 hours PRN  HYDROmorphone  Injectable 2 milliGRAM(s) IV Push every 3 hours PRN  lidocaine   4% Patch 1 Patch Transdermal daily  naloxone Injectable 0.4 milliGRAM(s) IV Push once  ondansetron Injectable 4 milliGRAM(s) IV Push every 8 hours PRN  polyethylene glycol 3350 17 Gram(s) Oral daily  senna 2 Tablet(s) Oral at bedtime  sodium chloride 0.45%. 1000 milliLiter(s) IV Continuous <Continuous>      ALLERGIES  penicillin (Pruritus)  hydroxyurea (Other)  piperacillin-tazobactam (Urticaria)  ceftriaxone (Anaphylaxis)      SOCIAL HISTORY: lives in house with brother and sister; no cigs for >20y, no ETOH; smokes weed; denies illicit drug use; on disability; both parents  of COVID in the past    FAMILY HISTORY:  Family history of sickle cell trait (Father, Mother)    Patient's father is  (Father)    Patient's mother is  (Mother)        ROS:    General: subjective fever; generalized body pain	    Skin: denies rash  	  HEENT: no complaints    Respiratory and Thorax: + cough but no SOB  	  Cardiovascular:	no CP    GI: see HPI    Genitourinary:	 no urinary tract complaints    Musculoskeletal:	 see HPI    Neurological:	no complaints    Hematology/Lymphatics:	 Sickle cell anemia    	    PHYSICAL EXAM:    Vital Signs Last 24 Hrs  T(C): 36.7 (19 Oct 2023 04:40), Max: 37.8 (19 Oct 2023 01:21)  T(F): 98.1 (19 Oct 2023 04:40), Max: 100 (19 Oct 2023 01:21)  HR: 94 (19 Oct 2023 04:40) (93 - 107)  BP: 132/77 (19 Oct 2023 04:40) (132/77 - 160/81)  BP(mean): 95 (18 Oct 2023 15:51) (95 - 95)  RR: 17 (19 Oct 2023 04:40) (16 - 18)  SpO2: 96% (19 Oct 2023 04:40) (94% - 100%)    Parameters below as of 19 Oct 2023 04:40  Patient On (Oxygen Delivery Method): nasal cannula  O2 Flow (L/min): 2        Gen: Alert and in NAD (despite c/o generalized pain)    HEENT: NC/AT; conj. icteric;     Neck: supple    Lymph Nodes: cd not palpate any enlarged submand, cervical or supraclav LNs    Back: + generalized tenderness to palpation    Chest/Thorax: clear to ausculation: R upper chest port without tenderness or drainage    Cardiovascular: S1S2 reg with no murmurs, gallops, rubs    ABD: Bs active; soft and non-tender to palpation    Genitourinary: no catheter in place    Extremities: 1+ edema distal LE bilat    Neurological:  A+Ox3    Skin: no rashes; multiple tattoos    Psychiatric: affect appropriate      LABS/DIAGNOSTIC TESTS:                          5.4    17.99 )-----------( 188      ( 19 Oct 2023 07:10 )             15.7     WBC Count: 17.99 K/uL (10-19 @ 07:10)  WBC Count: 25.59 K/uL (10-18 @ 00:56)      10-19    140  |  109<H>  |  24<H>  ----------------------------<  95  3.9   |  22  |  0.86    Ca    8.3<L>      19 Oct 2023 07:10  Phos  2.9     10-19  Mg     1.7     10-19    TPro  6.6  /  Alb  2.8<L>  /  TBili  9.8<H>  /  DBili  x   /  AST  97<H>  /  ALT  49  /  AlkPhos  96  10-19      Urine Microscopic-Add On (NC) (10.18.23 @ 04:10)   Granular Cast: Present  Red Blood Cell - Urine: 25 /HPF  White Blood Cell - Urine: 4 /HPF  Bacteria: Moderate /HPF  Comment - Urine: few amorphous urates  Squamous Epithelial Cells: Present    LIVER FUNCTIONS - ( 19 Oct 2023 07:10 )  Alb: 2.8 g/dL / Pro: 6.6 g/dL / ALK PHOS: 96 U/L / ALT: 49 U/L DA / AST: 97 U/L / GGT: x             PT/INR - ( 18 Oct 2023 00:56 )   PT: 14.2 sec;   INR: 1.25 ratio         PTT - ( 18 Oct 2023 00:56 )  PTT:45.0 sec    LACTATE: Lactate, Blood (10.18.23 @ 00:56)   Lactate, Blood: 1.0 mmol/L      Respiratory Viral Panel with COVID-19 by PABLO (10.13.23 @ 00:57)   Rapid RVP Result: [facility]  SARS-CoV-2: [facility]: This Respiratory Panel uses polymerase chain reaction (PCR) to detect for   adenovirus; coronavirus (HKU1, NL63, 229E, OC43); human metapneumovirus   (hMPV); human enterovirus/rhinovirus (Entero/RV); influenza A; influenza   A/H1; influenza A/H3; influenza A/H1-2009; influenza B; parainfluenza   viruses 1, 2, 3, 4; respiratory syncytial virus; Mycoplasma pneumoniae;   Chlamydophila pneumoniae; and SARS-CoV-2.  Adenovirus (RapRVP): NotDetec  Influenza A (RapRVP): NotDetec  Influenza AH1 2009 (RapRVP): NotDetec  Influenza AH1 (RapRVP): NotDetec  Influenza AH3 (RapRVP): NotDetec  Influenza B (RapRVP): NotDetec  Parainfluenza 1 (RapRVP): NotDetec  Parainfluenza 2 (RapRVP): NotDetec  Parainfluenza 3 (RapRVP): NotDetec  Parainfluenza 4 (RapRVP): NotDetec  Resp Syncytial Virus (RapRVP): NotDetec  Chlamydia pneumoniae (RapRVP): NotDetec  Mycoplasma pneumoniae (RapRVP): NotDetec  Entero/Rhinovirus (RapRVP): NotDetec  hMPV (RapRVP): NotDetec  Coronavirus (229E,HKU1,NL63,OC43): NotDetec    CULTURES:     Culture - Urine (collected 10-18-23 @ 04:10)  Source: Clean Catch Clean Catch (Midstream)  Final Report (10-19-23 @ 08:26):    <10,000 CFU/mL Normal Urogenital Nat        RADIOLOGY  < from: CT Abdomen and Pelvis w/ IV Cont (10.18.23 @ 10:27) >  ACC: [id number] EXAM:  CT ABDOMEN AND PELVIS IC   ORDERED BY: FÁTIMA WETZEL     PROCEDURE DATE:  10/18/2023          INTERPRETATION:  CLINICAL INFORMATION: Abdominal pain and sepsis    COMPARISON: 2022    CONTRAST/COMPLICATIONS:  IV Contrast: Omnipaque 350  90 cc administered   10 cc discarded  Oral Contrast: NONE  Complications: None reported at time of study completion    PROCEDURE:  CT of the Abdomen and Pelvis was performed.  Sagittal and coronal reformats were performed.    FINDINGS:  LOWER CHEST: Basilar atelectasis/scarring. Cardiomegaly. Mildly enlarged   mediastinal and cardiophrenic angle lymph nodes, similar to prior.    LIVER: Mild nodular contour.  BILE DUCTS: Normal caliber.  GALLBLADDER: Cholecystectomy.  SPLEEN: Atrophic and calcified spleen.  PANCREAS: Within normal limits.  ADRENALS: Within normal limits.  KIDNEYS/URETERS: Cyst and too small to further characterize   hypodensities. No hydronephrosis. Duplex bilateral renal collecting   systems.    BLADDER: Within normal limits.  REPRODUCTIVE ORGANS: Prostate within normal limits.    BOWEL: No bowel obstruction. Appendix is within normal limits.  PERITONEUM: Trace pelvic fluid.  VESSELS: Atherosclerotic changes.  RETROPERITONEUM/LYMPH NODES: Enlarged upper abdominal gastrohepatic,   portacaval, patrick hepatis, and peripancreatic lymph nodes, similar to   prior.  ABDOMINAL WALL: Fat-containing left inguinal hernia.  BONES: Degenerative changes. Stable wedge compression deformity of T12   vertebral body. Stable mild vertebral body height loss of L2.    IMPRESSION:  Redemonstrated upper abdominal adenopathy.      ___________________________________________________________________________________________________________________  < from: Xray Chest 1 View-PORTABLE IMMEDIATE (Xray Chest 1 View-PORTABLE IMMEDIATE .) (10.18.23 @ 04:44) >  ACC: [id number] EXAM:  XR CHEST PORTABLE IMMED 1V   ORDERED BY: FÁTMIA WETZEL     PROCEDURE DATE:  10/18/2023          INTERPRETATION:  AP chest on 2023 at 4:33 AM. Patient has   sepsis.    Heart likely enlarged. Right-sided port again noted.    There is a persistent linear density at the right base and several others   at the left base.    Chest is similar to May 19 is year.    IMPRESSION: Bibasilar linear densities are stable as is the chest.      < end of copied text >

## 2023-10-19 NOTE — PROGRESS NOTE ADULT - SUBJECTIVE AND OBJECTIVE BOX
HPI:  Pt is a 42 yo M w hx sickle cell anemia (baseline Hb 5-6, follows with Dr. Opal Jordan), multiple admissions for pain crisis most recently 8/2023, gout, presenting with complaint of subjective fevers as well as back pain. Pt says that he ran out of home oxycodone 2 weeks ago, and has had abdominal pain since 5 days prior to presentation. 3 days prior to presentation he began to experience subjective fevers, chills, rhinorrhea, cough productive of yellow phlegm that is thick. He says the back pain has gotten worse since then.    On current evaluation, pt complains of lower back pain and abdominal pain. He does not know of any sick contacts. He denies SOB, chest pain, dysuria, diarrhea.  Of note pt has chemo port (not on chemo) which he says was replaced within the last 2 days. (18 Oct 2023 12:16)      OVERNIGHT EVENTS:    No new overnight events.  Seen and examined at bedside.     REVIEW OF SYSTEMS:      CONSTITUTIONAL: No fever  EYES: no acute visual disturbances  NECK: No pain or stiffness  RESPIRATORY: No cough; No shortness of breath  CARDIOVASCULAR: No chest pain, no palpitations  GASTROINTESTINAL: No pain. No nausea, vomiting or diarrhea   NEUROLOGICAL: No headache or numbness, no tremors  MUSCULOSKELETAL: No joint pain, no muscle pain  GENITOURINARY: no dysuria, no frequency, no hesitancy  PSYCHIATRY: no depression, no anxiety  ALL OTHER  ROS negative        Vital Signs Last 24 Hrs  T(C): 36.7 (19 Oct 2023 04:40), Max: 37.8 (19 Oct 2023 01:21)  T(F): 98.1 (19 Oct 2023 04:40), Max: 100 (19 Oct 2023 01:21)  HR: 94 (19 Oct 2023 04:40) (93 - 107)  BP: 132/77 (19 Oct 2023 04:40) (132/77 - 158/79)  BP(mean): 95 (18 Oct 2023 15:51) (95 - 95)  RR: 17 (19 Oct 2023 04:40) (17 - 18)  SpO2: 96% (19 Oct 2023 04:40) (94% - 100%)    Parameters below as of 19 Oct 2023 04:40  Patient On (Oxygen Delivery Method): nasal cannula  O2 Flow (L/min): 2      ________________________________________________  PHYSICAL EXAM:    GENERAL: NAD  HEENT: Normocephalic; conjunctivae and sclerae clear;  NECK : supple, no JVD  CHEST/LUNG: Clear to auscultation; Nonlabored  HEART: S1 S2  regular  ABDOMEN: Soft, Nontender, Nondistended; Bowel sounds present  EXTREMITIES: no cyanosis; no LE edema; no calf tenderness  NERVOUS SYSTEM:  Alert; no new deficits  SKIN: warm and dry; No new rashes or lesions    _________________________________________________  CURRENT MEDICATIONS:    MEDICATIONS  (STANDING):  allopurinol 100 milliGRAM(s) Oral daily  folic acid 1 milliGRAM(s) Oral every 24 hours  heparin   Injectable 5000 Unit(s) SubCutaneous every 12 hours  levoFLOXacin  Tablet 750 milliGRAM(s) Oral every 24 hours  lidocaine   4% Patch 1 Patch Transdermal daily  naloxone Injectable 0.4 milliGRAM(s) IV Push once  pantoprazole    Tablet 40 milliGRAM(s) Oral before breakfast  polyethylene glycol 3350 17 Gram(s) Oral daily  senna 2 Tablet(s) Oral at bedtime  sodium chloride 0.45%. 1000 milliLiter(s) (150 mL/Hr) IV Continuous <Continuous>    MEDICATIONS  (PRN):  acetaminophen     Tablet .. 1000 milliGRAM(s) Oral every 8 hours PRN Moderate Pain (4 - 6)  bisacodyl 5 milliGRAM(s) Oral daily PRN Constipation  guaiFENesin Oral Liquid (Sugar-Free) 100 milliGRAM(s) Oral every 6 hours PRN Cough  HYDROmorphone  Injectable 2 milliGRAM(s) IV Push every 3 hours PRN Severe Pain (7 - 10)  ondansetron Injectable 4 milliGRAM(s) IV Push every 8 hours PRN Nausea and/or Vomiting      __________________________________________________  LABS:                          5.4    17.99 )-----------( 188      ( 19 Oct 2023 07:10 )             15.7     10-19    140  |  109<H>  |  24<H>  ----------------------------<  95  3.9   |  22  |  0.86    Ca    8.3<L>      19 Oct 2023 07:10  Phos  2.9     10-19  Mg     1.7     10-19    TPro  6.6  /  Alb  2.8<L>  /  TBili  9.8<H>  /  DBili  x   /  AST  97<H>  /  ALT  49  /  AlkPhos  96  10-19    PT/INR - ( 18 Oct 2023 00:56 )   PT: 14.2 sec;   INR: 1.25 ratio         PTT - ( 18 Oct 2023 00:56 )  PTT:45.0 sec  Urinalysis Basic - ( 19 Oct 2023 07:10 )    Color: x / Appearance: x / SG: x / pH: x  Gluc: 95 mg/dL / Ketone: x  / Bili: x / Urobili: x   Blood: x / Protein: x / Nitrite: x   Leuk Esterase: x / RBC: x / WBC x   Sq Epi: x / Non Sq Epi: x / Bacteria: x      CAPILLARY BLOOD GLUCOSE          __________________________________________________  RADIOLOGY & ADDITIONAL TESTS:    Imaging Personally Reviewed:  YES    < from: Xray Chest 1 View-PORTABLE IMMEDIATE (Xray Chest 1 View-PORTABLE IMMEDIATE .) (10.18.23 @ 04:44) >  IMPRESSION: Bibasilar linear densities are stable as is the chest.    < end of copied text >    < from: CT Abdomen and Pelvis w/ IV Cont (10.18.23 @ 10:27) >  IMPRESSION:  Redemonstrated upper abdominal adenopathy.    < end of copied text >    Consultant(s) Notes Reviewed:   YES     Plan of care was discussed with patient and /or primary care giver; all questions and concerns were addressed and care was aligned with patient's wishes.    Plan discussed with attending and consulting physicians.

## 2023-10-20 LAB
ANION GAP SERPL CALC-SCNC: 9 MMOL/L — SIGNIFICANT CHANGE UP (ref 5–17)
ANION GAP SERPL CALC-SCNC: 9 MMOL/L — SIGNIFICANT CHANGE UP (ref 5–17)
BUN SERPL-MCNC: 14 MG/DL — SIGNIFICANT CHANGE UP (ref 7–18)
BUN SERPL-MCNC: 14 MG/DL — SIGNIFICANT CHANGE UP (ref 7–18)
CALCIUM SERPL-MCNC: 7.6 MG/DL — LOW (ref 8.4–10.5)
CALCIUM SERPL-MCNC: 7.6 MG/DL — LOW (ref 8.4–10.5)
CHLORIDE SERPL-SCNC: 116 MMOL/L — HIGH (ref 96–108)
CHLORIDE SERPL-SCNC: 116 MMOL/L — HIGH (ref 96–108)
CO2 SERPL-SCNC: 18 MMOL/L — LOW (ref 22–31)
CO2 SERPL-SCNC: 18 MMOL/L — LOW (ref 22–31)
CREAT SERPL-MCNC: 0.59 MG/DL — SIGNIFICANT CHANGE UP (ref 0.5–1.3)
CREAT SERPL-MCNC: 0.59 MG/DL — SIGNIFICANT CHANGE UP (ref 0.5–1.3)
EGFR: 123 ML/MIN/1.73M2 — SIGNIFICANT CHANGE UP
EGFR: 123 ML/MIN/1.73M2 — SIGNIFICANT CHANGE UP
GLUCOSE SERPL-MCNC: 79 MG/DL — SIGNIFICANT CHANGE UP (ref 70–99)
GLUCOSE SERPL-MCNC: 79 MG/DL — SIGNIFICANT CHANGE UP (ref 70–99)
HCT VFR BLD CALC: 12.2 % — CRITICAL LOW (ref 39–50)
HCT VFR BLD CALC: 12.2 % — CRITICAL LOW (ref 39–50)
HCT VFR BLD CALC: 17.9 % — CRITICAL LOW (ref 39–50)
HCT VFR BLD CALC: 17.9 % — CRITICAL LOW (ref 39–50)
HGB BLD-MCNC: 4.2 G/DL — CRITICAL LOW (ref 13–17)
HGB BLD-MCNC: 4.2 G/DL — CRITICAL LOW (ref 13–17)
HGB BLD-MCNC: 6.2 G/DL — CRITICAL LOW (ref 13–17)
HGB BLD-MCNC: 6.2 G/DL — CRITICAL LOW (ref 13–17)
MAGNESIUM SERPL-MCNC: 1.3 MG/DL — LOW (ref 1.6–2.6)
MAGNESIUM SERPL-MCNC: 1.3 MG/DL — LOW (ref 1.6–2.6)
MCHC RBC-ENTMCNC: 30.7 PG — SIGNIFICANT CHANGE UP (ref 27–34)
MCHC RBC-ENTMCNC: 30.7 PG — SIGNIFICANT CHANGE UP (ref 27–34)
MCHC RBC-ENTMCNC: 30.8 PG — SIGNIFICANT CHANGE UP (ref 27–34)
MCHC RBC-ENTMCNC: 30.8 PG — SIGNIFICANT CHANGE UP (ref 27–34)
MCHC RBC-ENTMCNC: 34.4 GM/DL — SIGNIFICANT CHANGE UP (ref 32–36)
MCHC RBC-ENTMCNC: 34.4 GM/DL — SIGNIFICANT CHANGE UP (ref 32–36)
MCHC RBC-ENTMCNC: 34.6 GM/DL — SIGNIFICANT CHANGE UP (ref 32–36)
MCHC RBC-ENTMCNC: 34.6 GM/DL — SIGNIFICANT CHANGE UP (ref 32–36)
MCV RBC AUTO: 89.1 FL — SIGNIFICANT CHANGE UP (ref 80–100)
NRBC # BLD: 0 /100 WBCS — SIGNIFICANT CHANGE UP (ref 0–0)
PHOSPHATE SERPL-MCNC: 2 MG/DL — LOW (ref 2.5–4.5)
PHOSPHATE SERPL-MCNC: 2 MG/DL — LOW (ref 2.5–4.5)
PLATELET # BLD AUTO: 190 K/UL — SIGNIFICANT CHANGE UP (ref 150–400)
PLATELET # BLD AUTO: 190 K/UL — SIGNIFICANT CHANGE UP (ref 150–400)
PLATELET # BLD AUTO: 239 K/UL — SIGNIFICANT CHANGE UP (ref 150–400)
PLATELET # BLD AUTO: 239 K/UL — SIGNIFICANT CHANGE UP (ref 150–400)
POTASSIUM SERPL-MCNC: 3.2 MMOL/L — LOW (ref 3.5–5.3)
POTASSIUM SERPL-MCNC: 3.2 MMOL/L — LOW (ref 3.5–5.3)
POTASSIUM SERPL-SCNC: 3.2 MMOL/L — LOW (ref 3.5–5.3)
POTASSIUM SERPL-SCNC: 3.2 MMOL/L — LOW (ref 3.5–5.3)
RBC # BLD: 1.37 M/UL — LOW (ref 4.2–5.8)
RBC # BLD: 1.37 M/UL — LOW (ref 4.2–5.8)
RBC # BLD: 2.01 M/UL — LOW (ref 4.2–5.8)
RBC # BLD: 2.01 M/UL — LOW (ref 4.2–5.8)
RBC # FLD: 17.2 % — HIGH (ref 10.3–14.5)
RBC # FLD: 17.2 % — HIGH (ref 10.3–14.5)
RBC # FLD: 19.4 % — HIGH (ref 10.3–14.5)
RBC # FLD: 19.4 % — HIGH (ref 10.3–14.5)
SODIUM SERPL-SCNC: 143 MMOL/L — SIGNIFICANT CHANGE UP (ref 135–145)
SODIUM SERPL-SCNC: 143 MMOL/L — SIGNIFICANT CHANGE UP (ref 135–145)
WBC # BLD: 10.06 K/UL — SIGNIFICANT CHANGE UP (ref 3.8–10.5)
WBC # BLD: 10.06 K/UL — SIGNIFICANT CHANGE UP (ref 3.8–10.5)
WBC # BLD: 10.82 K/UL — HIGH (ref 3.8–10.5)
WBC # BLD: 10.82 K/UL — HIGH (ref 3.8–10.5)
WBC # FLD AUTO: 10.06 K/UL — SIGNIFICANT CHANGE UP (ref 3.8–10.5)
WBC # FLD AUTO: 10.06 K/UL — SIGNIFICANT CHANGE UP (ref 3.8–10.5)
WBC # FLD AUTO: 10.82 K/UL — HIGH (ref 3.8–10.5)
WBC # FLD AUTO: 10.82 K/UL — HIGH (ref 3.8–10.5)

## 2023-10-20 PROCEDURE — 99232 SBSQ HOSP IP/OBS MODERATE 35: CPT

## 2023-10-20 RX ORDER — POTASSIUM CHLORIDE 20 MEQ
40 PACKET (EA) ORAL ONCE
Refills: 0 | Status: COMPLETED | OUTPATIENT
Start: 2023-10-20 | End: 2023-10-20

## 2023-10-20 RX ORDER — POTASSIUM PHOSPHATE, MONOBASIC POTASSIUM PHOSPHATE, DIBASIC 236; 224 MG/ML; MG/ML
15 INJECTION, SOLUTION INTRAVENOUS ONCE
Refills: 0 | Status: COMPLETED | OUTPATIENT
Start: 2023-10-20 | End: 2023-10-20

## 2023-10-20 RX ORDER — DIPHENHYDRAMINE HCL 50 MG
50 CAPSULE ORAL EVERY 6 HOURS
Refills: 0 | Status: DISCONTINUED | OUTPATIENT
Start: 2023-10-20 | End: 2023-10-23

## 2023-10-20 RX ORDER — MAGNESIUM SULFATE 500 MG/ML
1 VIAL (ML) INJECTION ONCE
Refills: 0 | Status: COMPLETED | OUTPATIENT
Start: 2023-10-20 | End: 2023-10-20

## 2023-10-20 RX ADMIN — ONDANSETRON 4 MILLIGRAM(S): 8 TABLET, FILM COATED ORAL at 08:09

## 2023-10-20 RX ADMIN — HEPARIN SODIUM 5000 UNIT(S): 5000 INJECTION INTRAVENOUS; SUBCUTANEOUS at 17:06

## 2023-10-20 RX ADMIN — PANTOPRAZOLE SODIUM 40 MILLIGRAM(S): 20 TABLET, DELAYED RELEASE ORAL at 05:04

## 2023-10-20 RX ADMIN — POTASSIUM PHOSPHATE, MONOBASIC POTASSIUM PHOSPHATE, DIBASIC 62.5 MILLIMOLE(S): 236; 224 INJECTION, SOLUTION INTRAVENOUS at 14:06

## 2023-10-20 RX ADMIN — HYDROMORPHONE HYDROCHLORIDE 2 MILLIGRAM(S): 2 INJECTION INTRAMUSCULAR; INTRAVENOUS; SUBCUTANEOUS at 05:05

## 2023-10-20 RX ADMIN — HYDROMORPHONE HYDROCHLORIDE 2 MILLIGRAM(S): 2 INJECTION INTRAMUSCULAR; INTRAVENOUS; SUBCUTANEOUS at 05:35

## 2023-10-20 RX ADMIN — SODIUM CHLORIDE 150 MILLILITER(S): 9 INJECTION, SOLUTION INTRAVENOUS at 14:12

## 2023-10-20 RX ADMIN — HYDROMORPHONE HYDROCHLORIDE 2 MILLIGRAM(S): 2 INJECTION INTRAMUSCULAR; INTRAVENOUS; SUBCUTANEOUS at 14:36

## 2023-10-20 RX ADMIN — Medication 40 MILLIEQUIVALENT(S): at 08:34

## 2023-10-20 RX ADMIN — Medication 50 MILLIGRAM(S): at 09:30

## 2023-10-20 RX ADMIN — Medication 100 MILLIGRAM(S): at 14:12

## 2023-10-20 RX ADMIN — Medication 1000 MILLIGRAM(S): at 10:00

## 2023-10-20 RX ADMIN — HYDROMORPHONE HYDROCHLORIDE 2 MILLIGRAM(S): 2 INJECTION INTRAMUSCULAR; INTRAVENOUS; SUBCUTANEOUS at 14:06

## 2023-10-20 RX ADMIN — HYDROMORPHONE HYDROCHLORIDE 2 MILLIGRAM(S): 2 INJECTION INTRAMUSCULAR; INTRAVENOUS; SUBCUTANEOUS at 11:36

## 2023-10-20 RX ADMIN — Medication 100 GRAM(S): at 13:07

## 2023-10-20 RX ADMIN — HYDROMORPHONE HYDROCHLORIDE 2 MILLIGRAM(S): 2 INJECTION INTRAMUSCULAR; INTRAVENOUS; SUBCUTANEOUS at 23:51

## 2023-10-20 RX ADMIN — HYDROMORPHONE HYDROCHLORIDE 2 MILLIGRAM(S): 2 INJECTION INTRAMUSCULAR; INTRAVENOUS; SUBCUTANEOUS at 08:05

## 2023-10-20 RX ADMIN — HYDROMORPHONE HYDROCHLORIDE 2 MILLIGRAM(S): 2 INJECTION INTRAMUSCULAR; INTRAVENOUS; SUBCUTANEOUS at 17:36

## 2023-10-20 RX ADMIN — HYDROMORPHONE HYDROCHLORIDE 2 MILLIGRAM(S): 2 INJECTION INTRAMUSCULAR; INTRAVENOUS; SUBCUTANEOUS at 02:00

## 2023-10-20 RX ADMIN — SODIUM CHLORIDE 150 MILLILITER(S): 9 INJECTION, SOLUTION INTRAVENOUS at 02:00

## 2023-10-20 RX ADMIN — HYDROMORPHONE HYDROCHLORIDE 2 MILLIGRAM(S): 2 INJECTION INTRAMUSCULAR; INTRAVENOUS; SUBCUTANEOUS at 08:35

## 2023-10-20 RX ADMIN — HYDROMORPHONE HYDROCHLORIDE 2 MILLIGRAM(S): 2 INJECTION INTRAMUSCULAR; INTRAVENOUS; SUBCUTANEOUS at 11:06

## 2023-10-20 RX ADMIN — Medication 1 MILLIGRAM(S): at 17:08

## 2023-10-20 RX ADMIN — HYDROMORPHONE HYDROCHLORIDE 2 MILLIGRAM(S): 2 INJECTION INTRAMUSCULAR; INTRAVENOUS; SUBCUTANEOUS at 20:15

## 2023-10-20 RX ADMIN — HYDROMORPHONE HYDROCHLORIDE 2 MILLIGRAM(S): 2 INJECTION INTRAMUSCULAR; INTRAVENOUS; SUBCUTANEOUS at 20:51

## 2023-10-20 RX ADMIN — HYDROMORPHONE HYDROCHLORIDE 2 MILLIGRAM(S): 2 INJECTION INTRAMUSCULAR; INTRAVENOUS; SUBCUTANEOUS at 17:06

## 2023-10-20 RX ADMIN — Medication 1000 MILLIGRAM(S): at 09:30

## 2023-10-20 RX ADMIN — HEPARIN SODIUM 5000 UNIT(S): 5000 INJECTION INTRAVENOUS; SUBCUTANEOUS at 05:05

## 2023-10-20 NOTE — PROGRESS NOTE ADULT - SUBJECTIVE AND OBJECTIVE BOX
Pt seen, with unchanged pain, mod controlled with pain meds, hgb lower today, receiving PRBC this am.    MEDICATIONS  (STANDING):  allopurinol 100 milliGRAM(s) Oral daily  folic acid 1 milliGRAM(s) Oral every 24 hours  heparin   Injectable 5000 Unit(s) SubCutaneous every 12 hours  levoFLOXacin  Tablet 750 milliGRAM(s) Oral every 24 hours  lidocaine   4% Patch 1 Patch Transdermal daily  magnesium sulfate  IVPB 1 Gram(s) IV Intermittent once  naloxone Injectable 0.4 milliGRAM(s) IV Push once  pantoprazole    Tablet 40 milliGRAM(s) Oral before breakfast  polyethylene glycol 3350 17 Gram(s) Oral daily  potassium phosphate IVPB 15 milliMole(s) IV Intermittent once  senna 2 Tablet(s) Oral at bedtime  sodium chloride 0.45%. 1000 milliLiter(s) (150 mL/Hr) IV Continuous <Continuous>    MEDICATIONS  (PRN):  acetaminophen     Tablet .. 1000 milliGRAM(s) Oral every 8 hours PRN Moderate Pain (4 - 6)  bisacodyl 5 milliGRAM(s) Oral daily PRN Constipation  diphenhydrAMINE 50 milliGRAM(s) Oral every 6 hours PRN Rash and/or Itching  guaiFENesin Oral Liquid (Sugar-Free) 100 milliGRAM(s) Oral every 6 hours PRN Cough  HYDROmorphone  Injectable 2 milliGRAM(s) IV Push every 3 hours PRN Severe Pain (7 - 10)  ondansetron Injectable 4 milliGRAM(s) IV Push every 8 hours PRN Nausea and/or Vomiting      ROS  No fever, sweats, chills  No epistaxis, HA, sore throat  No CP, SOB, cough, sputum  No n/v/d, abd pain, melena, hematochezia  No edema  No rash  No anxiety  No bleeding, bruising  No dysuria, hematuria    Vital Signs Last 24 Hrs  T(C): 36.9 (20 Oct 2023 10:06), Max: 37.2 (19 Oct 2023 13:24)  T(F): 98.4 (20 Oct 2023 10:06), Max: 99 (19 Oct 2023 13:24)  HR: 75 (20 Oct 2023 10:06) (74 - 98)  BP: 139/82 (20 Oct 2023 10:06) (131/77 - 158/81)  BP(mean): --  RR: 18 (20 Oct 2023 10:06) (17 - 18)  SpO2: 98% (20 Oct 2023 10:06) (94% - 98%)    Parameters below as of 20 Oct 2023 10:06  Patient On (Oxygen Delivery Method): room air        PE  NAD  Awake, alert  MMM  full exam deferred today                          4.2    10.06 )-----------( 190      ( 20 Oct 2023 06:45 )             12.2       10-20    143  |  116<H>  |  14  ----------------------------<  79  3.2<L>   |  18<L>  |  0.59    Ca    7.6<L>      20 Oct 2023 06:45  Phos  2.0     10-20  Mg     1.3     10-20    TPro  6.6  /  Alb  2.8<L>  /  TBili  9.8<H>  /  DBili  x   /  AST  97<H>  /  ALT  49  /  AlkPhos  96  10-19

## 2023-10-20 NOTE — PROGRESS NOTE ADULT - TIME BILLING
--review of chart, including new notes and labs  --bedside evaluation  --discussion with the patient and medical team, including Dr. Block, regarding above recommendations    Please call again if needed.

## 2023-10-20 NOTE — PROGRESS NOTE ADULT - SUBJECTIVE AND OBJECTIVE BOX
Subjective/Objective:      MEDS  acetaminophen     Tablet .. 1000 milliGRAM(s) Oral every 8 hours PRN  allopurinol 100 milliGRAM(s) Oral daily  bisacodyl 5 milliGRAM(s) Oral daily PRN  diphenhydrAMINE 50 milliGRAM(s) Oral every 6 hours PRN  folic acid 1 milliGRAM(s) Oral every 24 hours  guaiFENesin Oral Liquid (Sugar-Free) 100 milliGRAM(s) Oral every 6 hours PRN  heparin   Injectable 5000 Unit(s) SubCutaneous every 12 hours  HYDROmorphone  Injectable 2 milliGRAM(s) IV Push every 3 hours PRN  levoFLOXacin  Tablet 750 milliGRAM(s) Oral every 24 hours (D2)  lidocaine   4% Patch 1 Patch Transdermal daily  magnesium sulfate  IVPB 1 Gram(s) IV Intermittent once  naloxone Injectable 0.4 milliGRAM(s) IV Push once  ondansetron Injectable 4 milliGRAM(s) IV Push every 8 hours PRN  pantoprazole    Tablet 40 milliGRAM(s) Oral before breakfast  polyethylene glycol 3350 17 Gram(s) Oral daily  potassium phosphate IVPB 15 milliMole(s) IV Intermittent once  senna 2 Tablet(s) Oral at bedtime  sodium chloride 0.45%. 1000 milliLiter(s) IV Continuous <Continuous>      PHYSICAL EXAM:    Vital Signs Last 24 Hrs  T(C): 36.9 (20 Oct 2023 10:06), Max: 37.2 (19 Oct 2023 13:24)  T(F): 98.4 (20 Oct 2023 10:06), Max: 99 (19 Oct 2023 13:24)  HR: 75 (20 Oct 2023 10:06) (74 - 98)  BP: 139/82 (20 Oct 2023 10:06) (131/77 - 158/81)  BP(mean): --  RR: 18 (20 Oct 2023 10:06) (17 - 18)  SpO2: 98% (20 Oct 2023 10:06) (94% - 98%)    Parameters below as of 20 Oct 2023 10:06  Patient On (Oxygen Delivery Method): room air        GEN:    HEENT:    CHEST/Respiratory:    Cardiovascular:    Abdomen:    Genitourinary:    Extremities:     Neurological:    Skin:      LABS/DIAGNOSTIC TESTS                            4.2    10.06 )-----------( 190      ( 20 Oct 2023 06:45 )             12.2       WBC Count: 10.06 K/uL (10-20 @ 06:45)  WBC Count: 17.99 K/uL (10-19 @ 07:10)  WBC Count: 25.59 K/uL (10-18 @ 00:56)      10-20    143  |  116<H>  |  14  ----------------------------<  79  3.2<L>   |  18<L>  |  0.59    Ca    7.6<L>      20 Oct 2023 06:45  Phos  2.0     10-20  Mg     1.3     10-20    TPro  6.6  /  Alb  2.8<L>  /  TBili  9.8<H>  /  DBili  x   /  AST  97<H>  /  ALT  49  /  AlkPhos  96  10-19      Urinalysis Basic - ( 20 Oct 2023 06:45 )    Color: x / Appearance: x / SG: x / pH: x  Gluc: 79 mg/dL / Ketone: x  / Bili: x / Urobili: x   Blood: x / Protein: x / Nitrite: x   Leuk Esterase: x / RBC: x / WBC x   Sq Epi: x / Non Sq Epi: x / Bacteria: x      CULTURES      Culture - Urine (collected 10-18-23 @ 04:10)  Source: Clean Catch Clean Catch (Midstream)  Final Report (10-19-23 @ 08:26):    <10,000 CFU/mL Normal Urogenital Nat    Culture - Blood (collected 10-18-23 @ 00:56)  Source: .Blood Blood-Peripheral  Preliminary Report (10-19-23 @ 13:02):    No growth at 24 hours    Culture - Blood (collected 10-18-23 @ 00:56)  Source: .Blood Blood-Peripheral  Preliminary Report (10-19-23 @ 13:02):    No growth at 24 hours          RADIOLOGY               Subjective/Objective: Pt still c/o generalized pain. Receiving PRBcs at the time of my re-evaluation.       MEDS  acetaminophen     Tablet .. 1000 milliGRAM(s) Oral every 8 hours PRN  allopurinol 100 milliGRAM(s) Oral daily  bisacodyl 5 milliGRAM(s) Oral daily PRN  diphenhydrAMINE 50 milliGRAM(s) Oral every 6 hours PRN  folic acid 1 milliGRAM(s) Oral every 24 hours  guaiFENesin Oral Liquid (Sugar-Free) 100 milliGRAM(s) Oral every 6 hours PRN  heparin   Injectable 5000 Unit(s) SubCutaneous every 12 hours  HYDROmorphone  Injectable 2 milliGRAM(s) IV Push every 3 hours PRN  levoFLOXacin  Tablet 750 milliGRAM(s) Oral every 24 hours (D2)  lidocaine   4% Patch 1 Patch Transdermal daily  magnesium sulfate  IVPB 1 Gram(s) IV Intermittent once  naloxone Injectable 0.4 milliGRAM(s) IV Push once  ondansetron Injectable 4 milliGRAM(s) IV Push every 8 hours PRN  pantoprazole    Tablet 40 milliGRAM(s) Oral before breakfast  polyethylene glycol 3350 17 Gram(s) Oral daily  potassium phosphate IVPB 15 milliMole(s) IV Intermittent once  senna 2 Tablet(s) Oral at bedtime  sodium chloride 0.45%. 1000 milliLiter(s) IV Continuous <Continuous>      PHYSICAL EXAM:    Vital Signs Last 24 Hrs  T(C): 36.9 (20 Oct 2023 10:06), Max: 37.2 (19 Oct 2023 13:24)  T(F): 98.4 (20 Oct 2023 10:06), Max: 99 (19 Oct 2023 13:24)  HR: 75 (20 Oct 2023 10:06) (74 - 98)  BP: 139/82 (20 Oct 2023 10:06) (131/77 - 158/81)  BP(mean): --  RR: 18 (20 Oct 2023 10:06) (17 - 18)  SpO2: 98% (20 Oct 2023 10:06) (94% - 98%)    Parameters below as of 20 Oct 2023 10:06  Patient On (Oxygen Delivery Method): room air    Gen: Alert and in NAD (despite c/o generalized pain)    HEENT: NC/AT; conj. icteric     Neck: supple    Chest/Thorax: clear to ausculation: R upper chest port without tenderness or drainage    Cardiovascular: S1S2 reg with no murmurs, gallops, rubs    ABD: Bs active; soft and non-tender to palpation    Genitourinary: no catheter in place    Extremities: 1+ edema distal LE bilat    Neurological:  A+Ox3    Skin: no rashes; multiple tattoos    Psychiatric: affect appropriate          LABS/DIAGNOSTIC TESTS                        4.2    10.06 )-----------( 190      ( 20 Oct 2023 06:45 )             12.2       WBC Count: 10.06 K/uL (10-20 @ 06:45)  WBC Count: 17.99 K/uL (10-19 @ 07:10)  WBC Count: 25.59 K/uL (10-18 @ 00:56)      10-20    143  |  116<H>  |  14  ----------------------------<  79  3.2<L>   |  18<L>  |  0.59    Ca    7.6<L>      20 Oct 2023 06:45  Phos  2.0     10-20  Mg     1.3     10-20    TPro  6.6  /  Alb  2.8<L>  /  TBili  9.8<H>  /  DBili  x   /  AST  97<H>  /  ALT  49  /  AlkPhos  96  10-19      Urinalysis Basic - ( 20 Oct 2023 06:45 )    Color: x / Appearance: x / SG: x / pH: x  Gluc: 79 mg/dL / Ketone: x  / Bili: x / Urobili: x   Blood: x / Protein: x / Nitrite: x   Leuk Esterase: x / RBC: x / WBC x   Sq Epi: x / Non Sq Epi: x / Bacteria: x      CULTURES      Culture - Urine (collected 10-18-23 @ 04:10)  Source: Clean Catch Clean Catch (Midstream)  Final Report (10-19-23 @ 08:26):    <10,000 CFU/mL Normal Urogenital Nat    Culture - Blood (collected 10-18-23 @ 00:56)  Source: .Blood Blood-Peripheral  Preliminary Report (10-19-23 @ 13:02):    No growth at 24 hours    Culture - Blood (collected 10-18-23 @ 00:56)  Source: .Blood Blood-Peripheral  Preliminary Report (10-19-23 @ 13:02):    No growth at 24 hours          RADIOLOGY

## 2023-10-20 NOTE — PROGRESS NOTE ADULT - SUBJECTIVE AND OBJECTIVE BOX
HPI:  Pt is a 44 yo M w hx sickle cell anemia (baseline Hb 5-6, follows with Dr. Opal Jordan), multiple admissions for pain crisis most recently 8/2023, gout, presenting with complaint of subjective fevers as well as back pain. Pt says that he ran out of home oxycodone 2 weeks ago, and has had abdominal pain since 5 days prior to presentation. 3 days prior to presentation he began to experience subjective fevers, chills, rhinorrhea, cough productive of yellow phlegm that is thick. He says the back pain has gotten worse since then.    On current evaluation, pt complains of lower back pain and abdominal pain. He does not know of any sick contacts. He denies SOB, chest pain, dysuria, diarrhea.  Of note pt has chemo port (not on chemo) which he says was replaced within the last 2 days. (18 Oct 2023 12:16)      OVERNIGHT EVENTS:    No new overnight events.  Seen and examined at bedside.     REVIEW OF SYSTEMS:      CONSTITUTIONAL: No fever  EYES: no acute visual disturbances  NECK: No pain or stiffness  RESPIRATORY: No cough; No shortness of breath  CARDIOVASCULAR: No chest pain, no palpitations  GASTROINTESTINAL: No pain. No nausea, vomiting or diarrhea   NEUROLOGICAL: No headache or numbness, no tremors  MUSCULOSKELETAL: No joint pain, no muscle pain  GENITOURINARY: no dysuria, no frequency, no hesitancy  PSYCHIATRY: no depression, no anxiety  ALL OTHER  ROS negative        Vital Signs Last 24 Hrs  T(C): 36.9 (20 Oct 2023 13:28), Max: 37.2 (19 Oct 2023 19:21)  T(F): 98.4 (20 Oct 2023 13:28), Max: 98.9 (19 Oct 2023 19:21)  HR: 75 (20 Oct 2023 13:28) (74 - 96)  BP: 143/78 (20 Oct 2023 13:28) (131/77 - 149/81)  BP(mean): --  RR: 18 (20 Oct 2023 13:28) (17 - 18)  SpO2: 96% (20 Oct 2023 13:28) (94% - 98%)    Parameters below as of 20 Oct 2023 13:28  Patient On (Oxygen Delivery Method): room air        ________________________________________________  PHYSICAL EXAM:    GENERAL: NAD  HEENT: Normocephalic; conjunctivae and sclerae clear;  NECK : supple, no JVD  CHEST/LUNG: Clear to auscultation; Nonlabored  HEART: S1 S2  regular  ABDOMEN: Soft, Nontender, Nondistended; Bowel sounds present  EXTREMITIES: no cyanosis; no LE edema; no calf tenderness  NERVOUS SYSTEM:  Alert; no new deficits  SKIN: warm and dry; No new rashes or lesions    _________________________________________________  CURRENT MEDICATIONS:    MEDICATIONS  (STANDING):  allopurinol 100 milliGRAM(s) Oral daily  folic acid 1 milliGRAM(s) Oral every 24 hours  heparin   Injectable 5000 Unit(s) SubCutaneous every 12 hours  lidocaine   4% Patch 1 Patch Transdermal daily  naloxone Injectable 0.4 milliGRAM(s) IV Push once  pantoprazole    Tablet 40 milliGRAM(s) Oral before breakfast  polyethylene glycol 3350 17 Gram(s) Oral daily  senna 2 Tablet(s) Oral at bedtime  sodium chloride 0.45%. 1000 milliLiter(s) (150 mL/Hr) IV Continuous <Continuous>    MEDICATIONS  (PRN):  acetaminophen     Tablet .. 1000 milliGRAM(s) Oral every 8 hours PRN Moderate Pain (4 - 6)  bisacodyl 5 milliGRAM(s) Oral daily PRN Constipation  diphenhydrAMINE 50 milliGRAM(s) Oral every 6 hours PRN Rash and/or Itching  guaiFENesin Oral Liquid (Sugar-Free) 100 milliGRAM(s) Oral every 6 hours PRN Cough  HYDROmorphone  Injectable 2 milliGRAM(s) IV Push every 3 hours PRN Severe Pain (7 - 10)  ondansetron Injectable 4 milliGRAM(s) IV Push every 8 hours PRN Nausea and/or Vomiting      __________________________________________________  LABS:                          4.2    10.06 )-----------( 190      ( 20 Oct 2023 06:45 )             12.2     10-20    143  |  116<H>  |  14  ----------------------------<  79  3.2<L>   |  18<L>  |  0.59    Ca    7.6<L>      20 Oct 2023 06:45  Phos  2.0     10-20  Mg     1.3     10-20    TPro  6.6  /  Alb  2.8<L>  /  TBili  9.8<H>  /  DBili  x   /  AST  97<H>  /  ALT  49  /  AlkPhos  96  10-19      Urinalysis Basic - ( 20 Oct 2023 06:45 )    Color: x / Appearance: x / SG: x / pH: x  Gluc: 79 mg/dL / Ketone: x  / Bili: x / Urobili: x   Blood: x / Protein: x / Nitrite: x   Leuk Esterase: x / RBC: x / WBC x   Sq Epi: x / Non Sq Epi: x / Bacteria: x      CAPILLARY BLOOD GLUCOSE          __________________________________________________  RADIOLOGY & ADDITIONAL TESTS:    Imaging Personally Reviewed:  YES    < from: CT Abdomen and Pelvis w/ IV Cont (10.18.23 @ 10:27) >  IMPRESSION:  Redemonstrated upper abdominal adenopathy.      < end of copied text >    Consultant(s) Notes Reviewed:   YES     Plan of care was discussed with patient and /or primary care giver; all questions and concerns were addressed and care was aligned with patient's wishes.    Plan discussed with attending and consulting physicians.

## 2023-10-20 NOTE — PROGRESS NOTE ADULT - PROBLEM SELECTOR PLAN 2
Hgb 4.2 (baseline 5-6)  No overt s/s bleeding  Transfuse 1U PRBC  Avoid ASA and NSAIDs  Trend CBC   Continue pain regimen  Dr. Jordan, Heme/Onc, following  Pain Mgmt following
Hgb 5.4 (baseline 5-6)  No overt s/s bleeding  Transfuse Hgb < 5.4   Avoid ASA and NSAIDs  Trend CBC Hb at baseline, no current indication for transfusion  Continue pain regimen  Dr. Jordan, Heme/Onc, following  Pain Mgmt following

## 2023-10-20 NOTE — PROGRESS NOTE ADULT - NS ATTEND AMEND GEN_ALL_CORE FT
Patient seen at bedside, states pain is unchanged. Denies new fever, CP, SOB.     On exam, patient is mildly distressed from pain, has jaundice, cardiopulmonary exams unremarkable, abdomen soft, NT/ND.   CBC, BMP reviewed, WBC 17->10, hgb 5.4->4.2 receiving pRBC, T-logan 12.1->9.8.     Assessment and plan:   43yM with PMH of gout and sickle cell anemia, with multiple admissions for sickle cell crisis, who presented with acute onset abdominal and back pain. Found to have anemia, elevated bilirubin, admitted for further evaluation.     #Sepsis, resolved   #Acute pain 2/2 sickle cell crisis   #MATA, resolved   #Gout on allopurinol   #DVT ppx   #Hx of allergy to CTX, pip/tazo, and PCN   - hematology recs appreciated, check haptoglobin and retic count tomorrow AM   - pain management recs appreciated, continue dilaudid for now   - ID recs appreciated, discontinue abx given low concern for bacterial infection contributing to his conditions   - daily CBC, BMP, LFT

## 2023-10-20 NOTE — PROGRESS NOTE ADULT - PROBLEM SELECTOR PLAN 3
Resolved  SCr 0.86 (Baseline 0.9)  Encourage PO fluids  Trend BMP
Resolved  Encourage PO fluids  Trend BMP

## 2023-10-21 LAB
ALBUMIN SERPL ELPH-MCNC: 2.7 G/DL — LOW (ref 3.5–5)
ALBUMIN SERPL ELPH-MCNC: 2.7 G/DL — LOW (ref 3.5–5)
ALP SERPL-CCNC: 94 U/L — SIGNIFICANT CHANGE UP (ref 40–120)
ALP SERPL-CCNC: 94 U/L — SIGNIFICANT CHANGE UP (ref 40–120)
ALT FLD-CCNC: 38 U/L DA — SIGNIFICANT CHANGE UP (ref 10–60)
ALT FLD-CCNC: 38 U/L DA — SIGNIFICANT CHANGE UP (ref 10–60)
ANION GAP SERPL CALC-SCNC: 3 MMOL/L — LOW (ref 5–17)
ANION GAP SERPL CALC-SCNC: 3 MMOL/L — LOW (ref 5–17)
AST SERPL-CCNC: 53 U/L — HIGH (ref 10–40)
AST SERPL-CCNC: 53 U/L — HIGH (ref 10–40)
BILIRUB DIRECT SERPL-MCNC: 4.5 MG/DL — HIGH (ref 0–0.3)
BILIRUB DIRECT SERPL-MCNC: 4.5 MG/DL — HIGH (ref 0–0.3)
BILIRUB INDIRECT FLD-MCNC: 2.5 MG/DL — HIGH (ref 0.2–1)
BILIRUB INDIRECT FLD-MCNC: 2.5 MG/DL — HIGH (ref 0.2–1)
BILIRUB SERPL-MCNC: 7 MG/DL — HIGH (ref 0.2–1.2)
BILIRUB SERPL-MCNC: 7 MG/DL — HIGH (ref 0.2–1.2)
BUN SERPL-MCNC: 15 MG/DL — SIGNIFICANT CHANGE UP (ref 7–18)
BUN SERPL-MCNC: 15 MG/DL — SIGNIFICANT CHANGE UP (ref 7–18)
CALCIUM SERPL-MCNC: 8.3 MG/DL — LOW (ref 8.4–10.5)
CALCIUM SERPL-MCNC: 8.3 MG/DL — LOW (ref 8.4–10.5)
CHLORIDE SERPL-SCNC: 110 MMOL/L — HIGH (ref 96–108)
CHLORIDE SERPL-SCNC: 110 MMOL/L — HIGH (ref 96–108)
CO2 SERPL-SCNC: 26 MMOL/L — SIGNIFICANT CHANGE UP (ref 22–31)
CO2 SERPL-SCNC: 26 MMOL/L — SIGNIFICANT CHANGE UP (ref 22–31)
CREAT SERPL-MCNC: 0.85 MG/DL — SIGNIFICANT CHANGE UP (ref 0.5–1.3)
CREAT SERPL-MCNC: 0.85 MG/DL — SIGNIFICANT CHANGE UP (ref 0.5–1.3)
EGFR: 110 ML/MIN/1.73M2 — SIGNIFICANT CHANGE UP
EGFR: 110 ML/MIN/1.73M2 — SIGNIFICANT CHANGE UP
GLUCOSE SERPL-MCNC: 110 MG/DL — HIGH (ref 70–99)
GLUCOSE SERPL-MCNC: 110 MG/DL — HIGH (ref 70–99)
HAPTOGLOB SERPL-MCNC: <20 MG/DL — LOW (ref 34–200)
HAPTOGLOB SERPL-MCNC: <20 MG/DL — LOW (ref 34–200)
HCT VFR BLD CALC: 16.8 % — CRITICAL LOW (ref 39–50)
HCT VFR BLD CALC: 16.8 % — CRITICAL LOW (ref 39–50)
HGB BLD-MCNC: 5.9 G/DL — CRITICAL LOW (ref 13–17)
HGB BLD-MCNC: 5.9 G/DL — CRITICAL LOW (ref 13–17)
MAGNESIUM SERPL-MCNC: 1.7 MG/DL — SIGNIFICANT CHANGE UP (ref 1.6–2.6)
MAGNESIUM SERPL-MCNC: 1.7 MG/DL — SIGNIFICANT CHANGE UP (ref 1.6–2.6)
MCHC RBC-ENTMCNC: 30.7 PG — SIGNIFICANT CHANGE UP (ref 27–34)
MCHC RBC-ENTMCNC: 30.7 PG — SIGNIFICANT CHANGE UP (ref 27–34)
MCHC RBC-ENTMCNC: 35.1 GM/DL — SIGNIFICANT CHANGE UP (ref 32–36)
MCHC RBC-ENTMCNC: 35.1 GM/DL — SIGNIFICANT CHANGE UP (ref 32–36)
MCV RBC AUTO: 87.5 FL — SIGNIFICANT CHANGE UP (ref 80–100)
MCV RBC AUTO: 87.5 FL — SIGNIFICANT CHANGE UP (ref 80–100)
NRBC # BLD: 0 /100 WBCS — SIGNIFICANT CHANGE UP (ref 0–0)
NRBC # BLD: 0 /100 WBCS — SIGNIFICANT CHANGE UP (ref 0–0)
PHOSPHATE SERPL-MCNC: 2.7 MG/DL — SIGNIFICANT CHANGE UP (ref 2.5–4.5)
PHOSPHATE SERPL-MCNC: 2.7 MG/DL — SIGNIFICANT CHANGE UP (ref 2.5–4.5)
PLATELET # BLD AUTO: 260 K/UL — SIGNIFICANT CHANGE UP (ref 150–400)
PLATELET # BLD AUTO: 260 K/UL — SIGNIFICANT CHANGE UP (ref 150–400)
POTASSIUM SERPL-MCNC: 4 MMOL/L — SIGNIFICANT CHANGE UP (ref 3.5–5.3)
POTASSIUM SERPL-MCNC: 4 MMOL/L — SIGNIFICANT CHANGE UP (ref 3.5–5.3)
POTASSIUM SERPL-SCNC: 4 MMOL/L — SIGNIFICANT CHANGE UP (ref 3.5–5.3)
POTASSIUM SERPL-SCNC: 4 MMOL/L — SIGNIFICANT CHANGE UP (ref 3.5–5.3)
PROT SERPL-MCNC: 6.5 G/DL — SIGNIFICANT CHANGE UP (ref 6–8.3)
PROT SERPL-MCNC: 6.5 G/DL — SIGNIFICANT CHANGE UP (ref 6–8.3)
RBC # BLD: 1.92 M/UL — LOW (ref 4.2–5.8)
RBC # FLD: 17.6 % — HIGH (ref 10.3–14.5)
RBC # FLD: 17.6 % — HIGH (ref 10.3–14.5)
RETICS #: 88.5 K/UL — SIGNIFICANT CHANGE UP (ref 25–125)
RETICS #: 88.5 K/UL — SIGNIFICANT CHANGE UP (ref 25–125)
RETICS/RBC NFR: 4.6 % — HIGH (ref 0.5–2.5)
RETICS/RBC NFR: 4.6 % — HIGH (ref 0.5–2.5)
SODIUM SERPL-SCNC: 139 MMOL/L — SIGNIFICANT CHANGE UP (ref 135–145)
SODIUM SERPL-SCNC: 139 MMOL/L — SIGNIFICANT CHANGE UP (ref 135–145)
WBC # BLD: 12.15 K/UL — HIGH (ref 3.8–10.5)
WBC # BLD: 12.15 K/UL — HIGH (ref 3.8–10.5)
WBC # FLD AUTO: 12.15 K/UL — HIGH (ref 3.8–10.5)
WBC # FLD AUTO: 12.15 K/UL — HIGH (ref 3.8–10.5)

## 2023-10-21 PROCEDURE — 99232 SBSQ HOSP IP/OBS MODERATE 35: CPT

## 2023-10-21 RX ADMIN — HEPARIN SODIUM 5000 UNIT(S): 5000 INJECTION INTRAVENOUS; SUBCUTANEOUS at 06:15

## 2023-10-21 RX ADMIN — HYDROMORPHONE HYDROCHLORIDE 2 MILLIGRAM(S): 2 INJECTION INTRAMUSCULAR; INTRAVENOUS; SUBCUTANEOUS at 12:20

## 2023-10-21 RX ADMIN — HYDROMORPHONE HYDROCHLORIDE 2 MILLIGRAM(S): 2 INJECTION INTRAMUSCULAR; INTRAVENOUS; SUBCUTANEOUS at 10:08

## 2023-10-21 RX ADMIN — HYDROMORPHONE HYDROCHLORIDE 2 MILLIGRAM(S): 2 INJECTION INTRAMUSCULAR; INTRAVENOUS; SUBCUTANEOUS at 17:18

## 2023-10-21 RX ADMIN — HYDROMORPHONE HYDROCHLORIDE 2 MILLIGRAM(S): 2 INJECTION INTRAMUSCULAR; INTRAVENOUS; SUBCUTANEOUS at 00:24

## 2023-10-21 RX ADMIN — Medication 1 MILLIGRAM(S): at 18:37

## 2023-10-21 RX ADMIN — HYDROMORPHONE HYDROCHLORIDE 2 MILLIGRAM(S): 2 INJECTION INTRAMUSCULAR; INTRAVENOUS; SUBCUTANEOUS at 13:20

## 2023-10-21 RX ADMIN — HYDROMORPHONE HYDROCHLORIDE 2 MILLIGRAM(S): 2 INJECTION INTRAMUSCULAR; INTRAVENOUS; SUBCUTANEOUS at 09:38

## 2023-10-21 RX ADMIN — HYDROMORPHONE HYDROCHLORIDE 2 MILLIGRAM(S): 2 INJECTION INTRAMUSCULAR; INTRAVENOUS; SUBCUTANEOUS at 14:20

## 2023-10-21 RX ADMIN — HYDROMORPHONE HYDROCHLORIDE 2 MILLIGRAM(S): 2 INJECTION INTRAMUSCULAR; INTRAVENOUS; SUBCUTANEOUS at 06:15

## 2023-10-21 RX ADMIN — HYDROMORPHONE HYDROCHLORIDE 2 MILLIGRAM(S): 2 INJECTION INTRAMUSCULAR; INTRAVENOUS; SUBCUTANEOUS at 21:06

## 2023-10-21 RX ADMIN — HYDROMORPHONE HYDROCHLORIDE 2 MILLIGRAM(S): 2 INJECTION INTRAMUSCULAR; INTRAVENOUS; SUBCUTANEOUS at 20:13

## 2023-10-21 RX ADMIN — HYDROMORPHONE HYDROCHLORIDE 2 MILLIGRAM(S): 2 INJECTION INTRAMUSCULAR; INTRAVENOUS; SUBCUTANEOUS at 02:57

## 2023-10-21 RX ADMIN — HYDROMORPHONE HYDROCHLORIDE 2 MILLIGRAM(S): 2 INJECTION INTRAMUSCULAR; INTRAVENOUS; SUBCUTANEOUS at 23:22

## 2023-10-21 RX ADMIN — HYDROMORPHONE HYDROCHLORIDE 2 MILLIGRAM(S): 2 INJECTION INTRAMUSCULAR; INTRAVENOUS; SUBCUTANEOUS at 06:47

## 2023-10-21 RX ADMIN — SODIUM CHLORIDE 150 MILLILITER(S): 9 INJECTION, SOLUTION INTRAVENOUS at 01:13

## 2023-10-21 RX ADMIN — Medication 100 MILLIGRAM(S): at 12:49

## 2023-10-21 RX ADMIN — HYDROMORPHONE HYDROCHLORIDE 2 MILLIGRAM(S): 2 INJECTION INTRAMUSCULAR; INTRAVENOUS; SUBCUTANEOUS at 16:48

## 2023-10-21 RX ADMIN — PANTOPRAZOLE SODIUM 40 MILLIGRAM(S): 20 TABLET, DELAYED RELEASE ORAL at 06:15

## 2023-10-21 RX ADMIN — HYDROMORPHONE HYDROCHLORIDE 2 MILLIGRAM(S): 2 INJECTION INTRAMUSCULAR; INTRAVENOUS; SUBCUTANEOUS at 03:27

## 2023-10-21 NOTE — PROVIDER CONTACT NOTE (CRITICAL VALUE NOTIFICATION) - ACTION/TREATMENT ORDERED:
Hem Onc Follow up
Pending heme/onc recommendation
Pt received PRC transfusion today, no need for repeat transfusion

## 2023-10-21 NOTE — PROGRESS NOTE ADULT - SUBJECTIVE AND OBJECTIVE BOX
INTERVAL HPI/OVERNIGHT EVENTS:  Patient S&E at bedside. No o/n events. Received 1 unit PRBC on 10/20- hemoglobin today 5.9 at baseline- clinically appears comfortable. Complains of back pain- not on oxygen and states breathing comfortably.     VITAL SIGNS:  T(F): 99.2 (10-21-23 @ 21:12)  HR: 92 (10-21-23 @ 21:12)  BP: 145/73 (10-21-23 @ 21:12)  RR: 17 (10-21-23 @ 21:12)  SpO2: 94% (10-21-23 @ 21:12)  Wt(kg): --    PHYSICAL EXAM:    Constitutional: NAD  Eyes: EOMI, sclera non-icteric  Neck: supple  Respiratory: CTAB, no wheezes or crackles   Cardiovascular: RRR  Gastrointestinal: soft, NTND, + BS  Extremities: no cyanosis, clubbing or edema   Neurological: awake and alert      MEDICATIONS  (STANDING):  allopurinol 100 milliGRAM(s) Oral daily  folic acid 1 milliGRAM(s) Oral every 24 hours  heparin   Injectable 5000 Unit(s) SubCutaneous every 12 hours  lidocaine   4% Patch 1 Patch Transdermal daily  naloxone Injectable 0.4 milliGRAM(s) IV Push once  pantoprazole    Tablet 40 milliGRAM(s) Oral before breakfast  polyethylene glycol 3350 17 Gram(s) Oral daily  senna 2 Tablet(s) Oral at bedtime    MEDICATIONS  (PRN):  acetaminophen     Tablet .. 1000 milliGRAM(s) Oral every 8 hours PRN Moderate Pain (4 - 6)  bisacodyl 5 milliGRAM(s) Oral daily PRN Constipation  diphenhydrAMINE 50 milliGRAM(s) Oral every 6 hours PRN Rash and/or Itching  guaiFENesin Oral Liquid (Sugar-Free) 100 milliGRAM(s) Oral every 6 hours PRN Cough  HYDROmorphone  Injectable 2 milliGRAM(s) IV Push every 3 hours PRN Severe Pain (7 - 10)  ondansetron Injectable 4 milliGRAM(s) IV Push every 8 hours PRN Nausea and/or Vomiting      Allergies    penicillin (Pruritus)  hydroxyurea (Other)  piperacillin-tazobactam (Urticaria)  ceftriaxone (Anaphylaxis)    Intolerances        LABS:                        5.9    12.15 )-----------( 260      ( 21 Oct 2023 05:10 )             16.8     10-21    139  |  110<H>  |  15  ----------------------------<  110<H>  4.0   |  26  |  0.85    Ca    8.3<L>      21 Oct 2023 05:10  Phos  2.7     10-21  Mg     1.7     10-21    TPro  6.5  /  Alb  2.7<L>  /  TBili  7.0<H>  /  DBili  4.5<H>  /  AST  53<H>  /  ALT  38  /  AlkPhos  94  10-21      Urinalysis Basic - ( 21 Oct 2023 05:10 )    Color: x / Appearance: x / SG: x / pH: x  Gluc: 110 mg/dL / Ketone: x  / Bili: x / Urobili: x   Blood: x / Protein: x / Nitrite: x   Leuk Esterase: x / RBC: x / WBC x   Sq Epi: x / Non Sq Epi: x / Bacteria: x        RADIOLOGY & ADDITIONAL TESTS:  Studies reviewed.

## 2023-10-21 NOTE — CHART NOTE - NSCHARTNOTEFT_GEN_A_CORE
Patient is a 43y old  Male who presents with a chief complaint of Sickle cell pain crisis (20 Oct 2023 15:23)  Received critical lab value H&H 5.9&16.8    Vital Signs Last 24 Hrs  T(F): 99.1 (21 Oct 2023 05:25), Max: 99.3 (20 Oct 2023 20:20)  HR: 79 (21 Oct 2023 05:25) (75 - 91)  BP: 159/99 (21 Oct 2023 05:25) (138/75 - 159/99)  RR: 18 (21 Oct 2023 05:25) (17 - 18)  SpO2: 94% (21 Oct 2023 05:25) (94% - 98%): room air      PAST MEDICAL & SURGICAL HISTORY:  Sickle cell anemia Gout History of cholecystectomy    LABS:                        5.9    12.15 )-----------( 260      ( 21 Oct 2023 05:10 )             16.8     10-21    139  |  110<H>  |  15  ----------------------------<  110<H>  4.0   |  26  |  0.85    Ca    8.3<L>      21 Oct 2023 05:10  Phos  2.7     10-21  Mg     1.7     10-21    TPro  6.5  /  Alb  2.7<L>  /  TBili  7.0<H>  /  DBili  4.5<H>  /  AST  53<H>  /  ALT  38  /  AlkPhos  94  10-21      ASSESSMENT AND PLAN   43y old  Male admitted for Sickle cell pain crisis (20 Oct 2023 15:23) with critical lab value today morning H&H 5.9&16.8  On admission H&H 4.2&12.2 and patient s/p transfusion of 1 unit PRB's yesterday      Hematology-oncology consulted and follows   recommends to minimize blood transfusions  will request hematology to re-evaluate with need for additional transfusions  VS stable   pain controlled

## 2023-10-21 NOTE — PROGRESS NOTE ADULT - SUBJECTIVE AND OBJECTIVE BOX
[facility]  Patient is a 43y old  Male who presents with a chief complaint of Sickle cell pain crisis (20 Oct 2023 15:23)      SUBJECTIVE / OVERNIGHT EVENTS:  No acute events over night.   Patient states the pain is somewhat better but is still there.       MEDICATIONS  (STANDING):  allopurinol 100 milliGRAM(s) Oral daily  folic acid 1 milliGRAM(s) Oral every 24 hours  heparin   Injectable 5000 Unit(s) SubCutaneous every 12 hours  lidocaine   4% Patch 1 Patch Transdermal daily  naloxone Injectable 0.4 milliGRAM(s) IV Push once  pantoprazole    Tablet 40 milliGRAM(s) Oral before breakfast  polyethylene glycol 3350 17 Gram(s) Oral daily  senna 2 Tablet(s) Oral at bedtime    MEDICATIONS  (PRN):  acetaminophen     Tablet .. 1000 milliGRAM(s) Oral every 8 hours PRN Moderate Pain (4 - 6)  bisacodyl 5 milliGRAM(s) Oral daily PRN Constipation  diphenhydrAMINE 50 milliGRAM(s) Oral every 6 hours PRN Rash and/or Itching  guaiFENesin Oral Liquid (Sugar-Free) 100 milliGRAM(s) Oral every 6 hours PRN Cough  HYDROmorphone  Injectable 2 milliGRAM(s) IV Push every 3 hours PRN Severe Pain (7 - 10)  ondansetron Injectable 4 milliGRAM(s) IV Push every 8 hours PRN Nausea and/or Vomiting          OBJECTIVE:  Vital Signs Last 24 Hrs  T(C): 37.3 (21 Oct 2023 05:25), Max: 37.4 (20 Oct 2023 20:20)  T(F): 99.1 (21 Oct 2023 05:25), Max: 99.3 (20 Oct 2023 20:20)  HR: 79 (21 Oct 2023 05:25) (79 - 91)  BP: 159/99 (21 Oct 2023 05:25) (153/80 - 159/99)  BP(mean): --  RR: 18 (21 Oct 2023 05:25) (18 - 18)  SpO2: 94% (21 Oct 2023 05:25) (94% - 95%)    Parameters below as of 21 Oct 2023 05:25  Patient On (Oxygen Delivery Method): room air        PHYSICAL EXAM:  GENERAL: NAD, well-developed  HEAD:  Atraumatic, Normocephalic  EYES: conjunctiva and sclera clear  NECK: Supple, No JVD  CHEST/LUNG: Clear to auscultation bilaterally; No wheeze  HEART: Regular rate and rhythm; No murmurs, rubs, or gallops  ABDOMEN: Soft, slight tenderness in epigastric area, Nondistended; Bowel sounds present  EXTREMITIES:  No clubbing, cyanosis, or edema  PSYCH: AAOx3  NEUROLOGY: non-focal  SKIN: No rashes or lesions      CAPILLARY BLOOD GLUCOSE        I&O's Summary            LABS:                        5.9    12.15 )-----------( 260      ( 21 Oct 2023 05:10 )             16.8     10-21    139  |  110<H>  |  15  ----------------------------<  110<H>  4.0   |  26  |  0.85    Ca    8.3<L>      21 Oct 2023 05:10  Phos  2.7     10-21  Mg     1.7     10-21    TPro  6.5  /  Alb  2.7<L>  /  TBili  7.0<H>  /  DBili  4.5<H>  /  AST  53<H>  /  ALT  38  /  AlkPhos  94  10-21          Urinalysis Basic - ( 21 Oct 2023 05:10 )    Color: x / Appearance: x / SG: x / pH: x  Gluc: 110 mg/dL / Ketone: x  / Bili: x / Urobili: x   Blood: x / Protein: x / Nitrite: x   Leuk Esterase: x / RBC: x / WBC x   Sq Epi: x / Non Sq Epi: x / Bacteria: x            RADIOLOGY & ADDITIONAL TESTS:

## 2023-10-21 NOTE — CHART NOTE - NSCHARTNOTESELECT_GEN_ALL_CORE
[employer] [facility] Rhombic Flap Text: The defect edges were debeveled with a #15 scalpel blade.  Given the location of the defect and the proximity to free margins a rhombic flap was deemed most appropriate.  Using a sterile surgical marker, an appropriate rhombic flap was drawn incorporating the defect.    The area thus outlined was incised deep to adipose tissue with a #15 scalpel blade.  The skin margins were undermined to an appropriate distance in all directions utilizing iris scissors.

## 2023-10-22 ENCOUNTER — TRANSCRIPTION ENCOUNTER (OUTPATIENT)
Age: 43
End: 2023-10-22

## 2023-10-22 LAB
ALBUMIN SERPL ELPH-MCNC: 2.8 G/DL — LOW (ref 3.5–5)
ALBUMIN SERPL ELPH-MCNC: 2.8 G/DL — LOW (ref 3.5–5)
ALP SERPL-CCNC: 90 U/L — SIGNIFICANT CHANGE UP (ref 40–120)
ALP SERPL-CCNC: 90 U/L — SIGNIFICANT CHANGE UP (ref 40–120)
ALT FLD-CCNC: 34 U/L DA — SIGNIFICANT CHANGE UP (ref 10–60)
ALT FLD-CCNC: 34 U/L DA — SIGNIFICANT CHANGE UP (ref 10–60)
ANION GAP SERPL CALC-SCNC: 5 MMOL/L — SIGNIFICANT CHANGE UP (ref 5–17)
ANION GAP SERPL CALC-SCNC: 5 MMOL/L — SIGNIFICANT CHANGE UP (ref 5–17)
ANISOCYTOSIS BLD QL: SIGNIFICANT CHANGE UP
ANISOCYTOSIS BLD QL: SIGNIFICANT CHANGE UP
AST SERPL-CCNC: 49 U/L — HIGH (ref 10–40)
AST SERPL-CCNC: 49 U/L — HIGH (ref 10–40)
BASOPHILS # BLD AUTO: 0.14 K/UL — SIGNIFICANT CHANGE UP (ref 0–0.2)
BASOPHILS # BLD AUTO: 0.14 K/UL — SIGNIFICANT CHANGE UP (ref 0–0.2)
BASOPHILS NFR BLD AUTO: 1.2 % — SIGNIFICANT CHANGE UP (ref 0–2)
BASOPHILS NFR BLD AUTO: 1.2 % — SIGNIFICANT CHANGE UP (ref 0–2)
BILIRUB SERPL-MCNC: 5.8 MG/DL — HIGH (ref 0.2–1.2)
BILIRUB SERPL-MCNC: 5.8 MG/DL — HIGH (ref 0.2–1.2)
BUN SERPL-MCNC: 13 MG/DL — SIGNIFICANT CHANGE UP (ref 7–18)
BUN SERPL-MCNC: 13 MG/DL — SIGNIFICANT CHANGE UP (ref 7–18)
CALCIUM SERPL-MCNC: 8.8 MG/DL — SIGNIFICANT CHANGE UP (ref 8.4–10.5)
CALCIUM SERPL-MCNC: 8.8 MG/DL — SIGNIFICANT CHANGE UP (ref 8.4–10.5)
CHLORIDE SERPL-SCNC: 110 MMOL/L — HIGH (ref 96–108)
CHLORIDE SERPL-SCNC: 110 MMOL/L — HIGH (ref 96–108)
CO2 SERPL-SCNC: 25 MMOL/L — SIGNIFICANT CHANGE UP (ref 22–31)
CO2 SERPL-SCNC: 25 MMOL/L — SIGNIFICANT CHANGE UP (ref 22–31)
CREAT SERPL-MCNC: 0.76 MG/DL — SIGNIFICANT CHANGE UP (ref 0.5–1.3)
CREAT SERPL-MCNC: 0.76 MG/DL — SIGNIFICANT CHANGE UP (ref 0.5–1.3)
EGFR: 114 ML/MIN/1.73M2 — SIGNIFICANT CHANGE UP
EGFR: 114 ML/MIN/1.73M2 — SIGNIFICANT CHANGE UP
EOSINOPHIL # BLD AUTO: 0.78 K/UL — HIGH (ref 0–0.5)
EOSINOPHIL # BLD AUTO: 0.78 K/UL — HIGH (ref 0–0.5)
EOSINOPHIL NFR BLD AUTO: 6.9 % — HIGH (ref 0–6)
EOSINOPHIL NFR BLD AUTO: 6.9 % — HIGH (ref 0–6)
GLUCOSE SERPL-MCNC: 120 MG/DL — HIGH (ref 70–99)
GLUCOSE SERPL-MCNC: 120 MG/DL — HIGH (ref 70–99)
HCT VFR BLD CALC: 17.9 % — CRITICAL LOW (ref 39–50)
HCT VFR BLD CALC: 17.9 % — CRITICAL LOW (ref 39–50)
HGB BLD-MCNC: 6.1 G/DL — CRITICAL LOW (ref 13–17)
HGB BLD-MCNC: 6.1 G/DL — CRITICAL LOW (ref 13–17)
HYPOCHROMIA BLD QL: SIGNIFICANT CHANGE UP
HYPOCHROMIA BLD QL: SIGNIFICANT CHANGE UP
IMM GRANULOCYTES NFR BLD AUTO: 1.2 % — HIGH (ref 0–0.9)
IMM GRANULOCYTES NFR BLD AUTO: 1.2 % — HIGH (ref 0–0.9)
LYMPHOCYTES # BLD AUTO: 3.43 K/UL — HIGH (ref 1–3.3)
LYMPHOCYTES # BLD AUTO: 3.43 K/UL — HIGH (ref 1–3.3)
LYMPHOCYTES # BLD AUTO: 30.5 % — SIGNIFICANT CHANGE UP (ref 13–44)
LYMPHOCYTES # BLD AUTO: 30.5 % — SIGNIFICANT CHANGE UP (ref 13–44)
MANUAL SMEAR VERIFICATION: SIGNIFICANT CHANGE UP
MANUAL SMEAR VERIFICATION: SIGNIFICANT CHANGE UP
MCHC RBC-ENTMCNC: 30.7 PG — SIGNIFICANT CHANGE UP (ref 27–34)
MCHC RBC-ENTMCNC: 30.7 PG — SIGNIFICANT CHANGE UP (ref 27–34)
MCHC RBC-ENTMCNC: 34.1 GM/DL — SIGNIFICANT CHANGE UP (ref 32–36)
MCHC RBC-ENTMCNC: 34.1 GM/DL — SIGNIFICANT CHANGE UP (ref 32–36)
MCV RBC AUTO: 89.9 FL — SIGNIFICANT CHANGE UP (ref 80–100)
MCV RBC AUTO: 89.9 FL — SIGNIFICANT CHANGE UP (ref 80–100)
MONOCYTES # BLD AUTO: 1.66 K/UL — HIGH (ref 0–0.9)
MONOCYTES # BLD AUTO: 1.66 K/UL — HIGH (ref 0–0.9)
MONOCYTES NFR BLD AUTO: 14.8 % — HIGH (ref 2–14)
MONOCYTES NFR BLD AUTO: 14.8 % — HIGH (ref 2–14)
NEUTROPHILS # BLD AUTO: 5.09 K/UL — SIGNIFICANT CHANGE UP (ref 1.8–7.4)
NEUTROPHILS # BLD AUTO: 5.09 K/UL — SIGNIFICANT CHANGE UP (ref 1.8–7.4)
NEUTROPHILS NFR BLD AUTO: 45.4 % — SIGNIFICANT CHANGE UP (ref 43–77)
NEUTROPHILS NFR BLD AUTO: 45.4 % — SIGNIFICANT CHANGE UP (ref 43–77)
NRBC # BLD: 0 /100 WBCS — SIGNIFICANT CHANGE UP (ref 0–0)
NRBC # BLD: 0 /100 WBCS — SIGNIFICANT CHANGE UP (ref 0–0)
PLAT MORPH BLD: NORMAL — SIGNIFICANT CHANGE UP
PLAT MORPH BLD: NORMAL — SIGNIFICANT CHANGE UP
PLATELET # BLD AUTO: 357 K/UL — SIGNIFICANT CHANGE UP (ref 150–400)
PLATELET # BLD AUTO: 357 K/UL — SIGNIFICANT CHANGE UP (ref 150–400)
PLATELET COUNT - ESTIMATE: NORMAL — SIGNIFICANT CHANGE UP
PLATELET COUNT - ESTIMATE: NORMAL — SIGNIFICANT CHANGE UP
POIKILOCYTOSIS BLD QL AUTO: SIGNIFICANT CHANGE UP
POIKILOCYTOSIS BLD QL AUTO: SIGNIFICANT CHANGE UP
POLYCHROMASIA BLD QL SMEAR: SLIGHT — SIGNIFICANT CHANGE UP
POLYCHROMASIA BLD QL SMEAR: SLIGHT — SIGNIFICANT CHANGE UP
POTASSIUM SERPL-MCNC: 3.7 MMOL/L — SIGNIFICANT CHANGE UP (ref 3.5–5.3)
POTASSIUM SERPL-MCNC: 3.7 MMOL/L — SIGNIFICANT CHANGE UP (ref 3.5–5.3)
POTASSIUM SERPL-SCNC: 3.7 MMOL/L — SIGNIFICANT CHANGE UP (ref 3.5–5.3)
POTASSIUM SERPL-SCNC: 3.7 MMOL/L — SIGNIFICANT CHANGE UP (ref 3.5–5.3)
PROT SERPL-MCNC: 6.7 G/DL — SIGNIFICANT CHANGE UP (ref 6–8.3)
PROT SERPL-MCNC: 6.7 G/DL — SIGNIFICANT CHANGE UP (ref 6–8.3)
RBC # BLD: 1.99 M/UL — LOW (ref 4.2–5.8)
RBC # FLD: 18.4 % — HIGH (ref 10.3–14.5)
RBC # FLD: 18.4 % — HIGH (ref 10.3–14.5)
RBC BLD AUTO: ABNORMAL
RBC BLD AUTO: ABNORMAL
RETICS #: 113.6 K/UL — SIGNIFICANT CHANGE UP (ref 25–125)
RETICS #: 113.6 K/UL — SIGNIFICANT CHANGE UP (ref 25–125)
RETICS/RBC NFR: 5.7 % — HIGH (ref 0.5–2.5)
RETICS/RBC NFR: 5.7 % — HIGH (ref 0.5–2.5)
SICKLE CELLS BLD QL SMEAR: SIGNIFICANT CHANGE UP
SICKLE CELLS BLD QL SMEAR: SIGNIFICANT CHANGE UP
SODIUM SERPL-SCNC: 140 MMOL/L — SIGNIFICANT CHANGE UP (ref 135–145)
SODIUM SERPL-SCNC: 140 MMOL/L — SIGNIFICANT CHANGE UP (ref 135–145)
TARGETS BLD QL SMEAR: SLIGHT — SIGNIFICANT CHANGE UP
TARGETS BLD QL SMEAR: SLIGHT — SIGNIFICANT CHANGE UP
WBC # BLD: 11.24 K/UL — HIGH (ref 3.8–10.5)
WBC # BLD: 11.24 K/UL — HIGH (ref 3.8–10.5)
WBC # FLD AUTO: 11.24 K/UL — HIGH (ref 3.8–10.5)
WBC # FLD AUTO: 11.24 K/UL — HIGH (ref 3.8–10.5)

## 2023-10-22 PROCEDURE — 99232 SBSQ HOSP IP/OBS MODERATE 35: CPT

## 2023-10-22 RX ADMIN — HYDROMORPHONE HYDROCHLORIDE 2 MILLIGRAM(S): 2 INJECTION INTRAMUSCULAR; INTRAVENOUS; SUBCUTANEOUS at 18:33

## 2023-10-22 RX ADMIN — HYDROMORPHONE HYDROCHLORIDE 2 MILLIGRAM(S): 2 INJECTION INTRAMUSCULAR; INTRAVENOUS; SUBCUTANEOUS at 11:46

## 2023-10-22 RX ADMIN — Medication 1 MILLIGRAM(S): at 18:33

## 2023-10-22 RX ADMIN — HYDROMORPHONE HYDROCHLORIDE 2 MILLIGRAM(S): 2 INJECTION INTRAMUSCULAR; INTRAVENOUS; SUBCUTANEOUS at 08:34

## 2023-10-22 RX ADMIN — PANTOPRAZOLE SODIUM 40 MILLIGRAM(S): 20 TABLET, DELAYED RELEASE ORAL at 05:02

## 2023-10-22 RX ADMIN — HYDROMORPHONE HYDROCHLORIDE 2 MILLIGRAM(S): 2 INJECTION INTRAMUSCULAR; INTRAVENOUS; SUBCUTANEOUS at 21:54

## 2023-10-22 RX ADMIN — HYDROMORPHONE HYDROCHLORIDE 2 MILLIGRAM(S): 2 INJECTION INTRAMUSCULAR; INTRAVENOUS; SUBCUTANEOUS at 12:16

## 2023-10-22 RX ADMIN — HYDROMORPHONE HYDROCHLORIDE 2 MILLIGRAM(S): 2 INJECTION INTRAMUSCULAR; INTRAVENOUS; SUBCUTANEOUS at 09:04

## 2023-10-22 RX ADMIN — HYDROMORPHONE HYDROCHLORIDE 2 MILLIGRAM(S): 2 INJECTION INTRAMUSCULAR; INTRAVENOUS; SUBCUTANEOUS at 21:34

## 2023-10-22 RX ADMIN — HYDROMORPHONE HYDROCHLORIDE 2 MILLIGRAM(S): 2 INJECTION INTRAMUSCULAR; INTRAVENOUS; SUBCUTANEOUS at 02:22

## 2023-10-22 RX ADMIN — HYDROMORPHONE HYDROCHLORIDE 2 MILLIGRAM(S): 2 INJECTION INTRAMUSCULAR; INTRAVENOUS; SUBCUTANEOUS at 02:52

## 2023-10-22 RX ADMIN — HYDROMORPHONE HYDROCHLORIDE 2 MILLIGRAM(S): 2 INJECTION INTRAMUSCULAR; INTRAVENOUS; SUBCUTANEOUS at 15:13

## 2023-10-22 RX ADMIN — HYDROMORPHONE HYDROCHLORIDE 2 MILLIGRAM(S): 2 INJECTION INTRAMUSCULAR; INTRAVENOUS; SUBCUTANEOUS at 05:30

## 2023-10-22 RX ADMIN — Medication 100 MILLIGRAM(S): at 12:30

## 2023-10-22 RX ADMIN — HYDROMORPHONE HYDROCHLORIDE 2 MILLIGRAM(S): 2 INJECTION INTRAMUSCULAR; INTRAVENOUS; SUBCUTANEOUS at 15:43

## 2023-10-22 RX ADMIN — HEPARIN SODIUM 5000 UNIT(S): 5000 INJECTION INTRAVENOUS; SUBCUTANEOUS at 05:01

## 2023-10-22 NOTE — DISCHARGE NOTE PROVIDER - ATTENDING DISCHARGE PHYSICAL EXAMINATION:
PHYSICAL EXAM:  GENERAL: NAD, well-developed  HEAD:  Atraumatic, Normocephalic  EYES: conjunctiva and sclera clear  NECK: Supple, No JVD  CHEST/LUNG: Clear to auscultation bilaterally; No wheeze  HEART: Regular rate and rhythm; No murmurs, rubs, or gallops  ABDOMEN: Soft, Nontender, Nondistended; Bowel sounds present  EXTREMITIES:  No clubbing, cyanosis, or edema  PSYCH: AAOx3  NEUROLOGY: non-focal  SKIN: No rashes or lesions

## 2023-10-22 NOTE — DISCHARGE NOTE PROVIDER - HOSPITAL COURSE
44 yo M w hx sickle cell anemia (baseline Hb 5-6, follows with Dr. Opal Jordan), multiple admissions for pain crisis most recently 8/2023, gout, presenting with complaint of subjective fevers as well as back pain. pt admitted for sepsis secondary to uti and sickle cell crisis. ID consulted, and pt s/p abx therapy. cxr, rvp and culture all negative. pt found to have hgb 4.2 on admission, heme/onc consulted and 1 unit prbc given, pt back at baseline which is hgb 5-6.     case discussed with attending, pt medically stable for d/c. Please note that this a brief summary of hospital course please refer to daily progress notes and consult notes for full course and events       42 yo M w hx sickle cell anemia (baseline Hb 5-6, follows with Dr. Opal Jordan), multiple admissions for pain crisis most recently 8/2023, gout, presenting with complaint of subjective fevers as well as back pain. pt admitted for sepsis and sickle cell crisis. ID consulted for possible UTI, and abx was discontinued given the low concern of bacterial infection contributing to his symptoms. He did not have worsening vital signs or symptoms after the discontinuation. Hematology followed patient, he received RBC transfusion, and hgb stabilized during the course. His pain also improved to his baseline. Patient is now deemed stable for discharge with hematology follow-up.  Please note that this a brief summary of hospital course please refer to daily progress notes and consult notes for full course and events       42 yo M w hx sickle cell anemia (baseline Hb 5-6, follows with Dr. Opal Jordan), multiple admissions for pain crisis most recently 8/2023, gout, presenting with complaint of subjective fevers as well as back pain. pt admitted for sepsis and sickle cell crisis. ID consulted for possible UTI, and abx was discontinued given the low concern of bacterial infection contributing to his symptoms. He did not have worsening vital signs or symptoms after the discontinuation. Hematology followed patient, he received RBC transfusion, and hgb stabilized during the course. His pain also improved to his baseline. Patient is now deemed stable for discharge with hematology follow-up.  Please note that this a brief summary of hospital course please refer to daily progress notes and consult notes for full course and events

## 2023-10-22 NOTE — DISCHARGE NOTE PROVIDER - NSDCMRMEDTOKEN_GEN_ALL_CORE_FT
allopurinol 100 mg oral tablet: 1 tab(s) orally once a day  folic acid 1 mg oral tablet: 1 tab(s) orally every 24 hours  oxyCODONE 30 mg oral tablet: 1 tab(s) orally every 4 hours as needed for  severe pain  polyethylene glycol 3350 oral powder for reconstitution: 17 gram(s) orally once a day, As Needed - for constipation

## 2023-10-22 NOTE — DISCHARGE NOTE PROVIDER - NSDCCPCAREPLAN_GEN_ALL_CORE_FT
PRINCIPAL DISCHARGE DIAGNOSIS  Diagnosis: Sepsis  Assessment and Plan of Treatment: You presented to the hospital with complaints of fevers, cough, runny nose, associated with back and abdominal pain. YOu were diagnosed with sepsis. Sepsis is the body's extreme response to an infection. you were started on IV antibiotics and followed by an infectious disease doctor. YOur condition has imporved.  Call you Health care provider upon arrival home to make a one week follow up appointment.  If you develop fever, chills, malaise, or change in mental status call your Health Care Provider or go to the Emergency Department.  Nutrition is important, eat small frequent meals to help ensure you get adequate calories.  Do not stay in bed all day!  Increase your activity daily as tolerated.        SECONDARY DISCHARGE DIAGNOSES  Diagnosis: Anemia, sickle cell with crisis  Assessment and Plan of Treatment: You were treated for a sickle cell crisis, you recieved pain medications, fluids and a blood transfusion. A sickle cell crisis is a painful episode that occurs in people who have sickle cell anemia. It happens when sickle-shaped red blood cells (RBCs) block blood vessels. Blood and oxygen cannot get to tissues, causing pain. A sickle cell crisis can also damage your tissues and cause organ failure, such liver or kidney failure. A sickle cell crisis can become life-threatening.  DISCHARGE INSTRUCTIONS:  Call your local emergency number (911 in the [place]) if:  You have shortness of breath or chest pain.  You are a man and have an erection that is painful and does not go away.  You lose vision in one or both eyes.  Prevent a sickle cell crisis:  Take vitamins and minerals as directed. Folic acid may help prevent blood vessel problems that can occur with sickle cell anemia. Zinc may decrease how often you have pain.  Drink liquids as directed. Dehydration can increase your risk for a sickle cell crisis.   Balance rest and exercise. Rest during a sickle cell crisis. Over time, increase your activity to a moderate amount.   Wash your hands frequently. Handwashing can help prevent illness.   Do not smoke cigarettes or drink alcohol. These increase your risk for a sickle cell crisis.   Ask about vaccines you may need. Vaccines can help prevent a viral infection that may lead to a sickle cell crisis.   Follow up with your doctor    Diagnosis: Gout  Assessment and Plan of Treatment: Continue with medication as prescribed.  Follow-up with your primary care physician .  Call your physician if you develop pain not relieved with pain regimen, fever and or swelling/redness in your extremity (ies).

## 2023-10-22 NOTE — PROGRESS NOTE ADULT - SUBJECTIVE AND OBJECTIVE BOX
[facility]  Patient is a 43y old  Male who presents with a chief complaint of Sickle cell pain crisis (21 Oct 2023 23:30)      SUBJECTIVE / OVERNIGHT EVENTS:  No acute events over night.   Patient today states that his pain is somewhat better. Denies fever, chills, SOB, CP.    MEDICATIONS  (STANDING):  allopurinol 100 milliGRAM(s) Oral daily  folic acid 1 milliGRAM(s) Oral every 24 hours  heparin   Injectable 5000 Unit(s) SubCutaneous every 12 hours  lidocaine   4% Patch 1 Patch Transdermal daily  naloxone Injectable 0.4 milliGRAM(s) IV Push once  pantoprazole    Tablet 40 milliGRAM(s) Oral before breakfast  polyethylene glycol 3350 17 Gram(s) Oral daily  senna 2 Tablet(s) Oral at bedtime    MEDICATIONS  (PRN):  acetaminophen     Tablet .. 1000 milliGRAM(s) Oral every 8 hours PRN Moderate Pain (4 - 6)  bisacodyl 5 milliGRAM(s) Oral daily PRN Constipation  diphenhydrAMINE 50 milliGRAM(s) Oral every 6 hours PRN Rash and/or Itching  guaiFENesin Oral Liquid (Sugar-Free) 100 milliGRAM(s) Oral every 6 hours PRN Cough  HYDROmorphone  Injectable 2 milliGRAM(s) IV Push every 3 hours PRN Severe Pain (7 - 10)  ondansetron Injectable 4 milliGRAM(s) IV Push every 8 hours PRN Nausea and/or Vomiting          OBJECTIVE:  Vital Signs Last 24 Hrs  T(C): 36.8 (22 Oct 2023 05:01), Max: 37.3 (21 Oct 2023 21:12)  T(F): 98.2 (22 Oct 2023 05:01), Max: 99.2 (21 Oct 2023 21:12)  HR: 82 (22 Oct 2023 05:01) (82 - 92)  BP: 136/78 (22 Oct 2023 05:01) (136/78 - 159/90)  BP(mean): 100 (22 Oct 2023 05:01) (100 - 100)  RR: 17 (22 Oct 2023 05:01) (17 - 17)  SpO2: 96% (22 Oct 2023 05:01) (94% - 96%)    Parameters below as of 22 Oct 2023 05:01  Patient On (Oxygen Delivery Method): room air        PHYSICAL EXAM:  GENERAL: NAD, well-developed  HEAD:  Atraumatic, Normocephalic  EYES: conjunctiva and sclera clear  NECK: Supple, No JVD  CHEST/LUNG: Clear to auscultation bilaterally; No wheeze  HEART: Regular rate and rhythm; No murmurs, rubs, or gallops  ABDOMEN: Soft, slight tenderness in epigastric area, Nondistended; Bowel sounds present  EXTREMITIES:  No clubbing, cyanosis, or edema  PSYCH: AAOx3  NEUROLOGY: non-focal  SKIN: No rashes or lesions      CAPILLARY BLOOD GLUCOSE        I&O's Summary            LABS:                        6.1    11.24 )-----------( 357      ( 22 Oct 2023 06:40 )             17.9     10-22    140  |  110<H>  |  13  ----------------------------<  120<H>  3.7   |  25  |  0.76    Ca    8.8      22 Oct 2023 06:40  Phos  2.7     10-21  Mg     1.7     10-21    TPro  6.7  /  Alb  2.8<L>  /  TBili  5.8<H>  /  DBili  x   /  AST  49<H>  /  ALT  34  /  AlkPhos  90  10-22          Urinalysis Basic - ( 22 Oct 2023 06:40 )    Color: x / Appearance: x / SG: x / pH: x  Gluc: 120 mg/dL / Ketone: x  / Bili: x / Urobili: x   Blood: x / Protein: x / Nitrite: x   Leuk Esterase: x / RBC: x / WBC x   Sq Epi: x / Non Sq Epi: x / Bacteria: x            RADIOLOGY & ADDITIONAL TESTS:

## 2023-10-23 ENCOUNTER — TRANSCRIPTION ENCOUNTER (OUTPATIENT)
Age: 43
End: 2023-10-23

## 2023-10-23 VITALS
OXYGEN SATURATION: 97 % | RESPIRATION RATE: 17 BRPM | TEMPERATURE: 98 F | HEART RATE: 65 BPM | DIASTOLIC BLOOD PRESSURE: 72 MMHG | SYSTOLIC BLOOD PRESSURE: 144 MMHG

## 2023-10-23 DIAGNOSIS — M54.9 DORSALGIA, UNSPECIFIED: ICD-10-CM

## 2023-10-23 LAB
ALBUMIN SERPL ELPH-MCNC: 2.9 G/DL — LOW (ref 3.5–5)
ALBUMIN SERPL ELPH-MCNC: 2.9 G/DL — LOW (ref 3.5–5)
ALP SERPL-CCNC: 97 U/L — SIGNIFICANT CHANGE UP (ref 40–120)
ALP SERPL-CCNC: 97 U/L — SIGNIFICANT CHANGE UP (ref 40–120)
ALT FLD-CCNC: 38 U/L DA — SIGNIFICANT CHANGE UP (ref 10–60)
ALT FLD-CCNC: 38 U/L DA — SIGNIFICANT CHANGE UP (ref 10–60)
ANION GAP SERPL CALC-SCNC: 7 MMOL/L — SIGNIFICANT CHANGE UP (ref 5–17)
ANION GAP SERPL CALC-SCNC: 7 MMOL/L — SIGNIFICANT CHANGE UP (ref 5–17)
AST SERPL-CCNC: 57 U/L — HIGH (ref 10–40)
AST SERPL-CCNC: 57 U/L — HIGH (ref 10–40)
BILIRUB SERPL-MCNC: 5.5 MG/DL — HIGH (ref 0.2–1.2)
BILIRUB SERPL-MCNC: 5.5 MG/DL — HIGH (ref 0.2–1.2)
BUN SERPL-MCNC: 13 MG/DL — SIGNIFICANT CHANGE UP (ref 7–18)
BUN SERPL-MCNC: 13 MG/DL — SIGNIFICANT CHANGE UP (ref 7–18)
CALCIUM SERPL-MCNC: 8.5 MG/DL — SIGNIFICANT CHANGE UP (ref 8.4–10.5)
CALCIUM SERPL-MCNC: 8.5 MG/DL — SIGNIFICANT CHANGE UP (ref 8.4–10.5)
CHLORIDE SERPL-SCNC: 110 MMOL/L — HIGH (ref 96–108)
CHLORIDE SERPL-SCNC: 110 MMOL/L — HIGH (ref 96–108)
CO2 SERPL-SCNC: 24 MMOL/L — SIGNIFICANT CHANGE UP (ref 22–31)
CO2 SERPL-SCNC: 24 MMOL/L — SIGNIFICANT CHANGE UP (ref 22–31)
CREAT SERPL-MCNC: 0.81 MG/DL — SIGNIFICANT CHANGE UP (ref 0.5–1.3)
CREAT SERPL-MCNC: 0.81 MG/DL — SIGNIFICANT CHANGE UP (ref 0.5–1.3)
CULTURE RESULTS: SIGNIFICANT CHANGE UP
EGFR: 112 ML/MIN/1.73M2 — SIGNIFICANT CHANGE UP
EGFR: 112 ML/MIN/1.73M2 — SIGNIFICANT CHANGE UP
GLUCOSE SERPL-MCNC: 96 MG/DL — SIGNIFICANT CHANGE UP (ref 70–99)
GLUCOSE SERPL-MCNC: 96 MG/DL — SIGNIFICANT CHANGE UP (ref 70–99)
HAPTOGLOB SERPL-MCNC: <20 MG/DL — LOW (ref 34–200)
HAPTOGLOB SERPL-MCNC: <20 MG/DL — LOW (ref 34–200)
HCT VFR BLD CALC: 18.4 % — CRITICAL LOW (ref 39–50)
HCT VFR BLD CALC: 18.4 % — CRITICAL LOW (ref 39–50)
HGB BLD-MCNC: 6.4 G/DL — CRITICAL LOW (ref 13–17)
HGB BLD-MCNC: 6.4 G/DL — CRITICAL LOW (ref 13–17)
MCHC RBC-ENTMCNC: 31.7 PG — SIGNIFICANT CHANGE UP (ref 27–34)
MCHC RBC-ENTMCNC: 31.7 PG — SIGNIFICANT CHANGE UP (ref 27–34)
MCHC RBC-ENTMCNC: 34.8 GM/DL — SIGNIFICANT CHANGE UP (ref 32–36)
MCHC RBC-ENTMCNC: 34.8 GM/DL — SIGNIFICANT CHANGE UP (ref 32–36)
MCV RBC AUTO: 91.1 FL — SIGNIFICANT CHANGE UP (ref 80–100)
MCV RBC AUTO: 91.1 FL — SIGNIFICANT CHANGE UP (ref 80–100)
NRBC # BLD: 0 /100 WBCS — SIGNIFICANT CHANGE UP (ref 0–0)
NRBC # BLD: 0 /100 WBCS — SIGNIFICANT CHANGE UP (ref 0–0)
PLATELET # BLD AUTO: 410 K/UL — HIGH (ref 150–400)
PLATELET # BLD AUTO: 410 K/UL — HIGH (ref 150–400)
POTASSIUM SERPL-MCNC: 3.9 MMOL/L — SIGNIFICANT CHANGE UP (ref 3.5–5.3)
POTASSIUM SERPL-MCNC: 3.9 MMOL/L — SIGNIFICANT CHANGE UP (ref 3.5–5.3)
POTASSIUM SERPL-SCNC: 3.9 MMOL/L — SIGNIFICANT CHANGE UP (ref 3.5–5.3)
POTASSIUM SERPL-SCNC: 3.9 MMOL/L — SIGNIFICANT CHANGE UP (ref 3.5–5.3)
PROT SERPL-MCNC: 7 G/DL — SIGNIFICANT CHANGE UP (ref 6–8.3)
PROT SERPL-MCNC: 7 G/DL — SIGNIFICANT CHANGE UP (ref 6–8.3)
RBC # BLD: 2.02 M/UL — LOW (ref 4.2–5.8)
RBC # FLD: 18.9 % — HIGH (ref 10.3–14.5)
RBC # FLD: 18.9 % — HIGH (ref 10.3–14.5)
RETICS #: 174.3 K/UL — HIGH (ref 25–125)
RETICS #: 174.3 K/UL — HIGH (ref 25–125)
RETICS/RBC NFR: 8.6 % — HIGH (ref 0.5–2.5)
RETICS/RBC NFR: 8.6 % — HIGH (ref 0.5–2.5)
SODIUM SERPL-SCNC: 141 MMOL/L — SIGNIFICANT CHANGE UP (ref 135–145)
SODIUM SERPL-SCNC: 141 MMOL/L — SIGNIFICANT CHANGE UP (ref 135–145)
SPECIMEN SOURCE: SIGNIFICANT CHANGE UP
WBC # BLD: 11.59 K/UL — HIGH (ref 3.8–10.5)
WBC # BLD: 11.59 K/UL — HIGH (ref 3.8–10.5)
WBC # FLD AUTO: 11.59 K/UL — HIGH (ref 3.8–10.5)
WBC # FLD AUTO: 11.59 K/UL — HIGH (ref 3.8–10.5)

## 2023-10-23 PROCEDURE — 93005 ELECTROCARDIOGRAM TRACING: CPT

## 2023-10-23 PROCEDURE — 85025 COMPLETE CBC W/AUTO DIFF WBC: CPT

## 2023-10-23 PROCEDURE — 36430 TRANSFUSION BLD/BLD COMPNT: CPT

## 2023-10-23 PROCEDURE — 86140 C-REACTIVE PROTEIN: CPT

## 2023-10-23 PROCEDURE — 71045 X-RAY EXAM CHEST 1 VIEW: CPT

## 2023-10-23 PROCEDURE — 86901 BLOOD TYPING SEROLOGIC RH(D): CPT

## 2023-10-23 PROCEDURE — 99232 SBSQ HOSP IP/OBS MODERATE 35: CPT

## 2023-10-23 PROCEDURE — 85027 COMPLETE CBC AUTOMATED: CPT

## 2023-10-23 PROCEDURE — 80048 BASIC METABOLIC PNL TOTAL CA: CPT

## 2023-10-23 PROCEDURE — 83735 ASSAY OF MAGNESIUM: CPT

## 2023-10-23 PROCEDURE — 82248 BILIRUBIN DIRECT: CPT

## 2023-10-23 PROCEDURE — 86900 BLOOD TYPING SEROLOGIC ABO: CPT

## 2023-10-23 PROCEDURE — 86850 RBC ANTIBODY SCREEN: CPT

## 2023-10-23 PROCEDURE — 83615 LACTATE (LD) (LDH) ENZYME: CPT

## 2023-10-23 PROCEDURE — 87637 SARSCOV2&INF A&B&RSV AMP PRB: CPT

## 2023-10-23 PROCEDURE — 87086 URINE CULTURE/COLONY COUNT: CPT

## 2023-10-23 PROCEDURE — 85730 THROMBOPLASTIN TIME PARTIAL: CPT

## 2023-10-23 PROCEDURE — 86922 COMPATIBILITY TEST ANTIGLOB: CPT

## 2023-10-23 PROCEDURE — 83690 ASSAY OF LIPASE: CPT

## 2023-10-23 PROCEDURE — 84100 ASSAY OF PHOSPHORUS: CPT

## 2023-10-23 PROCEDURE — 87040 BLOOD CULTURE FOR BACTERIA: CPT

## 2023-10-23 PROCEDURE — 83540 ASSAY OF IRON: CPT

## 2023-10-23 PROCEDURE — 74177 CT ABD & PELVIS W/CONTRAST: CPT | Mod: MA

## 2023-10-23 PROCEDURE — 85610 PROTHROMBIN TIME: CPT

## 2023-10-23 PROCEDURE — 83550 IRON BINDING TEST: CPT

## 2023-10-23 PROCEDURE — 80053 COMPREHEN METABOLIC PANEL: CPT

## 2023-10-23 PROCEDURE — 96372 THER/PROPH/DIAG INJ SC/IM: CPT | Mod: XU

## 2023-10-23 PROCEDURE — 81001 URINALYSIS AUTO W/SCOPE: CPT

## 2023-10-23 PROCEDURE — 80076 HEPATIC FUNCTION PANEL: CPT

## 2023-10-23 PROCEDURE — 84145 PROCALCITONIN (PCT): CPT

## 2023-10-23 PROCEDURE — 36415 COLL VENOUS BLD VENIPUNCTURE: CPT

## 2023-10-23 PROCEDURE — 83605 ASSAY OF LACTIC ACID: CPT

## 2023-10-23 PROCEDURE — 99239 HOSP IP/OBS DSCHRG MGMT >30: CPT

## 2023-10-23 PROCEDURE — 99285 EMERGENCY DEPT VISIT HI MDM: CPT | Mod: 25

## 2023-10-23 PROCEDURE — 83010 ASSAY OF HAPTOGLOBIN QUANT: CPT

## 2023-10-23 PROCEDURE — 96376 TX/PRO/DX INJ SAME DRUG ADON: CPT

## 2023-10-23 PROCEDURE — 82247 BILIRUBIN TOTAL: CPT

## 2023-10-23 PROCEDURE — P9040: CPT

## 2023-10-23 PROCEDURE — 85045 AUTOMATED RETICULOCYTE COUNT: CPT

## 2023-10-23 PROCEDURE — 96375 TX/PRO/DX INJ NEW DRUG ADDON: CPT

## 2023-10-23 PROCEDURE — 96374 THER/PROPH/DIAG INJ IV PUSH: CPT

## 2023-10-23 PROCEDURE — 82728 ASSAY OF FERRITIN: CPT

## 2023-10-23 RX ADMIN — PANTOPRAZOLE SODIUM 40 MILLIGRAM(S): 20 TABLET, DELAYED RELEASE ORAL at 06:50

## 2023-10-23 RX ADMIN — Medication 100 MILLIGRAM(S): at 12:58

## 2023-10-23 RX ADMIN — HYDROMORPHONE HYDROCHLORIDE 2 MILLIGRAM(S): 2 INJECTION INTRAMUSCULAR; INTRAVENOUS; SUBCUTANEOUS at 00:39

## 2023-10-23 RX ADMIN — HYDROMORPHONE HYDROCHLORIDE 2 MILLIGRAM(S): 2 INJECTION INTRAMUSCULAR; INTRAVENOUS; SUBCUTANEOUS at 03:44

## 2023-10-23 RX ADMIN — Medication 100 UNIT(S): at 14:56

## 2023-10-23 RX ADMIN — HYDROMORPHONE HYDROCHLORIDE 2 MILLIGRAM(S): 2 INJECTION INTRAMUSCULAR; INTRAVENOUS; SUBCUTANEOUS at 07:10

## 2023-10-23 RX ADMIN — HYDROMORPHONE HYDROCHLORIDE 2 MILLIGRAM(S): 2 INJECTION INTRAMUSCULAR; INTRAVENOUS; SUBCUTANEOUS at 12:55

## 2023-10-23 RX ADMIN — HYDROMORPHONE HYDROCHLORIDE 2 MILLIGRAM(S): 2 INJECTION INTRAMUSCULAR; INTRAVENOUS; SUBCUTANEOUS at 06:48

## 2023-10-23 RX ADMIN — HYDROMORPHONE HYDROCHLORIDE 2 MILLIGRAM(S): 2 INJECTION INTRAMUSCULAR; INTRAVENOUS; SUBCUTANEOUS at 04:00

## 2023-10-23 RX ADMIN — HYDROMORPHONE HYDROCHLORIDE 2 MILLIGRAM(S): 2 INJECTION INTRAMUSCULAR; INTRAVENOUS; SUBCUTANEOUS at 01:00

## 2023-10-23 RX ADMIN — HYDROMORPHONE HYDROCHLORIDE 2 MILLIGRAM(S): 2 INJECTION INTRAMUSCULAR; INTRAVENOUS; SUBCUTANEOUS at 09:55

## 2023-10-23 RX ADMIN — HYDROMORPHONE HYDROCHLORIDE 2 MILLIGRAM(S): 2 INJECTION INTRAMUSCULAR; INTRAVENOUS; SUBCUTANEOUS at 13:25

## 2023-10-23 RX ADMIN — HYDROMORPHONE HYDROCHLORIDE 2 MILLIGRAM(S): 2 INJECTION INTRAMUSCULAR; INTRAVENOUS; SUBCUTANEOUS at 10:25

## 2023-10-23 NOTE — PROGRESS NOTE ADULT - ASSESSMENT
43yM with PMH of gout and sickle cell anemia, with multiple admissions for sickle cell crisis, who presented with acute onset abdominal and back pain. Found to have anemia, elevated bilirubin, admitted for further evaluation.     #Sepsis, resolved   #Acute pain 2/2 sickle cell crisis   #MATA, resolved   #Gout on allopurinol   #DVT ppx   #Hx of allergy to CTX, pip/tazo, and PCN   - hematology recs appreciated  - high retic count compatible with dx of sickle cell crisis  - pain management recs appreciated, continue dilaudid for now   - ID recs appreciated, monitoring off abx without signs of worsening symptoms  - daily CBC, BMP, LFT, bilirubin downtrending
42 yo M w sickle cell anemia, gout, admitted for sepsis and sickle cell crisis.
Confidential Drug Utilization Report  Search Terms: Alex Downey, 1980Search Date: 10/19/2023 10:15:10 AM  The Drug Utilization Report below displays all of the controlled substance prescriptions, if any, that your patient has filled in the last twelve months. The information displayed on this report is compiled from pharmacy submissions to the Department, and accurately reflects the information as submitted by the pharmacies.    This report was requested by: Lina Cintron | Reference #: [id number]    You have not added a NANCY number. Keeping your NANCY number(s) up to date on the My NANCY # tab will enable the separation of your prescriptions from others in the search results.    Practitioner Count: 2  Pharmacy Count: 1  Current Opioid Prescriptions: 1  Current Benzodiazepine Prescriptions: 0  Current Stimulant Prescriptions: 0      Patient Demographic Information (PDI)       PDI	First Name	Last Name	Birth Date	Gender	Street Address	City	State	Zip Code  DAVID Downey	1980	Male	[phone]	[facility]	[address]    Prescription Information      PDI Filter:    PDI	Current Rx	Drug Type	Rx Written	Rx Dispensed	Drug	Quantity	Days Supply	Prescriber Name	Prescriber NANCY #	Payment Method	Dispenser  A	Y	O	09/14/2023	09/15/2023	oxycodone hcl (ir) 30 mg tab	180	45	Jordan, Shirley SOLITARIO	[id number]	Medicaid	[place]   A	N	O	08/17/2023	08/18/2023	oxycodone hcl (ir) 30 mg tab	180	30	Laney Boggs	[id number]	Medicaid	[place]   A	N	O	07/20/2023	07/21/2023	oxycodone hcl (ir) 30 mg tab	180	30	Laney Boggs	[id number]	Medicaid	[place]   A	N	O	06/22/2023	06/23/2023	oxycodone hcl (ir) 30 mg tab	180	30	JordanShirley MD	[id number]	Medicaid	[place]   A	N	O	04/20/2023	04/25/2023	oxycodone hcl (ir) 30 mg tab	180	30	Jordan, Shirley SOLITARIO	[id number]	Medicaid	[place]   A	N	O	03/23/2023	03/27/2023	oxycodone hcl (ir) 30 mg tab	180	30	Jordan, Shirley SOLITARIO	[id number]	Insurance	[place]   A	N	O	02/23/2023	02/27/2023	oxycodone hcl (ir) 30 mg tab	180	30	Jordan, Shirley SOLITARIO	[id number]	Insurance	[place]   A	N	O	12/29/2022	01/02/2023	oxycodone hcl (ir) 30 mg tab	180	30	Jag Ayala	[id number]	Insurance	[place]   A	N	O	12/01/2022	12/03/2022	oxycodone hcl (ir) 30 mg tab	180	30	Jordan, Shirley SOLITARIO	[id number]	Insurance	[place]   A	N	O	11/03/2022	11/04/2022	oxycodone hcl (ir) 30 mg tab	180	30	Jordan, Shirley SOLITARIO	[id number]	Insurance	[place]     * - Details of Drug Type : O = Opioid, B = Benzodiazepine, S = Stimulant    * - Drugs marked with an asterisk are compound drugs. If the compound drug is made up of more than one controlled substance, then each controlled substance will be a separate row in the table.
Mr Downey is a pleasant 43 year old gentleman with history of sickle cell anemia, now with URI with sickle cell pain crisis    Sickle cell crisis:   ·	Patient follows with Dr. Jordan from [facility]  ·	Baseline hb of 5, s/p PRBC on 10/20, Hb 5.9 today- clinically appears well with hemolysis parameters improved- continue to monitor for now, no need for PRBC today  ·	will still continue to minimize blood transfusion as much as possible  ·	 continue monitor CBC and CMP daily, monitor bilirubin, haptoglobin, retic count  ·	Pain management per primary team, appears to be adequate at this time  ·	 continue folic acid daily    Will follow. D/w pt    Arash Serrato MD   Hematology/Oncology   [facility]   [phone]  
42 yo M w sickle cell anemia, gout, admitted for sepsis and sickle cell crisis.
43yM with PMH of gout and sickle cell anemia, with multiple admissions for sickle cell crisis, who presented with acute onset abdominal and back pain. Found to have anemia, elevated bilirubin, admitted for further evaluation.     #Sepsis, resolved   #Acute pain 2/2 sickle cell crisis   #MATA, resolved   #Gout on allopurinol   #DVT ppx   #Hx of allergy to CTX, pip/tazo, and PCN   - hematology recs appreciated  - high retic count compatible with dx of sickle cell crisis  - CBC stable today  - pain management recs appreciated, continue dilaudid for now   - ID recs appreciated, monitoring off abx without signs of worsening symptoms  - daily CBC, BMP, LFT, bilirubin downtrending
Mr Downey is a pleasant 43 year old gentleman with history of sickle cell anemia, now with URI with sickle cell pain crisis    Sickle cell crisis:   ·	Patient follows with Dr. Jordan from [facility]  ·	Baseline hb of 5, hemoglobin acutely lower today, with elevated bilirubin, symptoms consistent with vaso-occlusive crisis, agree with blood transfusion today, would give 1 unit of PRBC today.  ·	will still continue to minimize blood transfusion as much as possible  ·	 continue monitor CBC and CMP daily, monitor bilirubin, haptoglobin, retic count  ·	Pain management per primary team, appears to be adequate at this time  ·	 continue folic acid daily    Will follow. D/w pt
· Assessment	  Mr Downey is a pleasant 43 year old gentleman with history of sickle cell anemia, now with URI with sickle cell pain crisis    Sickle cell crisis:   ·	Patient follows with Dr. Jordan from [facility]  ·	Baseline hb of 5, s/p PRBC on 10/20, Hb 5.9 today- clinically appears well with hemolysis parameters improved- continue to monitor for now, no need for PRBC today  ·	will still continue to minimize blood transfusion as much as possible  ·	 continue monitor CBC and CMP daily, monitor bilirubin, haptoglobin, retic count  ·	Pain management per primary team, appears to be adequate at this time  ·	 continue folic acid daily  ·	no fever off antibiotics  ·	plan discharge    Will follow. D/w pt
 42 yo M w hx sickle cell anemia (baseline Hb 5-6, follows with Dr. Opal Jordan), multiple admissions for painful crises most recently 8/2023, gout, presenting with complaint of fevers, rhinorrhea, cough productive of yellow sputum with no SOB, abd pain, N, V and generalized body pain. Pt says that he ran out of home oxycodone 2 weeks ago. Pt met criteria for sepsis on admission and was started on aztreonam. RVP negative. Currently, c/o generalized body pain but is in no distress at the time of my re-evaluation. BCs and urine Cx negative. Pt receiving PRBCs.     # Sepsis-- pt's clinical presentation likely due to painful crisis rather than infection. ALL Cx negative and would stop AB.  --d/c levoquine     #Drug allergies-- reportedly with significant allergy to PCN, pip/tazo, and CTX (see above)    #Painful crisis in pt with SSA-- fluid and pain management as per medical team      #Conj. icterus-- pt will elevated bili likely related to RBC breakdown from sickle crisis

## 2023-10-23 NOTE — PROGRESS NOTE ADULT - PROBLEM SELECTOR PLAN 1
Pt with chronic generalized joint pain which is somatic in nature due to sickle cell disease. Pt on chronic opioids- oxycodone 30mg PO 4x/day verified on istop. Retic count 5.5% on admission, increased to 8.6% 10/23. S/p 1 unit PRBC 10/20. H/H stable.   Opioid pain recommendations   - Discontinue dilaudid 2mg IV q3 hours PRN severe pain. Transition to home oxycodone 30mg PO q 6h PRN severe pain. Monitor for sedation/ respiratory depression.   Non-opioid pain recommendations   - Avoid NSAIDs- anemia   - Acetaminophen 1 gram PO q 8 hours PRN moderate pain.  - Pt refused muscle relaxants, lidoderm patch.   Bowel Regimen  - Continue Miralax 17G PO daily  - Continue Senna 2 tablets at bedtime for constipation  - Continue dulcolax 5mg PO daily PRN constipation  Mild pain (score 1-3)  - Non-pharmacological pain treatment recommendations  - Warm/ Cool packs PRN   - Repositioning, imagery, relaxation, distraction.  - Physical therapy OOB if no contraindications   Recommendations discussed with primary team and RN.
CXR noted above  RVP neg  Urine culture negative  Blood cultures NGTD (48H)  Continue PO Levaquin  Continue PRN Tylenol  Dr. Carolina, ID, following  Recommendations appreciated
CXR noted above  RVP neg  Urine culture negative  Blood cultures NGTD (24H)  Start PO Levaquin  Continue PRN Tylenol  Dr. Carolina, ID, following  Recommendations appreciated

## 2023-10-23 NOTE — DISCHARGE NOTE NURSING/CASE MANAGEMENT/SOCIAL WORK - PATIENT PORTAL LINK FT
You can access the FollowMyHealth Patient Portal offered by [facility] by registering at the following website: [email or web address]. By joining [facility]’s FollowMyHealth portal, you will also be able to view your health information using other applications (apps) compatible with our system.

## 2023-10-23 NOTE — PROGRESS NOTE ADULT - PROVIDER SPECIALTY LIST ADULT
Heme/Onc
Heme/Onc
Hospitalist
Hospitalist
Infectious Disease
Heme/Onc
Internal Medicine
Internal Medicine
Pain Medicine

## 2023-10-23 NOTE — PROGRESS NOTE ADULT - SUBJECTIVE AND OBJECTIVE BOX
HPI:  Pt is a 44 yo M w hx sickle cell anemia (baseline Hb 5-6, follows with Dr. Opal Jordan), multiple admissions for pain crisis most recently 8/2023, gout, presenting with complaint of subjective fevers as well as back pain. Pt says that he ran out of home oxycodone 2 weeks ago, and has had abdominal pain since 5 days prior to presentation. 3 days prior to presentation he began to experience subjective fevers, chills, rhinorrhea, cough productive of yellow phlegm that is thick. He says the back pain has gotten worse since then.    On current evaluation, pt complains of lower back pain and abdominal pain. He does not know of any sick contacts. He denies SOB, chest pain, dysuria, diarrhea.  Of note pt has chemo port (not on chemo) which he says was replaced within the last 2 days. (18 Oct 2023 12:16)     Pt is seen and examined  pt is awake and lying in bed/out of bed to chair  pt seems comfortable and denies any complaints at this time    ROS:  Negative except for:    MEDICATIONS  (STANDING):  allopurinol 100 milliGRAM(s) Oral daily  folic acid 1 milliGRAM(s) Oral every 24 hours  heparin   Injectable 5000 Unit(s) SubCutaneous every 12 hours  lidocaine   4% Patch 1 Patch Transdermal daily  naloxone Injectable 0.4 milliGRAM(s) IV Push once  pantoprazole    Tablet 40 milliGRAM(s) Oral before breakfast  polyethylene glycol 3350 17 Gram(s) Oral daily  senna 2 Tablet(s) Oral at bedtime    MEDICATIONS  (PRN):  acetaminophen     Tablet .. 1000 milliGRAM(s) Oral every 8 hours PRN Moderate Pain (4 - 6)  bisacodyl 5 milliGRAM(s) Oral daily PRN Constipation  diphenhydrAMINE 50 milliGRAM(s) Oral every 6 hours PRN Rash and/or Itching  guaiFENesin Oral Liquid (Sugar-Free) 100 milliGRAM(s) Oral every 6 hours PRN Cough  HYDROmorphone  Injectable 2 milliGRAM(s) IV Push every 3 hours PRN Severe Pain (7 - 10)  ondansetron Injectable 4 milliGRAM(s) IV Push every 8 hours PRN Nausea and/or Vomiting      Allergies    penicillin (Pruritus)  hydroxyurea (Other)  piperacillin-tazobactam (Urticaria)  ceftriaxone (Anaphylaxis)    Intolerances        Vital Signs Last 24 Hrs  T(C): 36.5 (23 Oct 2023 05:26), Max: 36.9 (22 Oct 2023 20:23)  T(F): 97.7 (23 Oct 2023 05:26), Max: 98.4 (22 Oct 2023 20:23)  HR: 68 (23 Oct 2023 05:26) (68 - 88)  BP: 145/83 (23 Oct 2023 05:26) (134/75 - 150/81)  BP(mean): --  RR: 18 (23 Oct 2023 05:26) (17 - 18)  SpO2: 95% (23 Oct 2023 05:26) (95% - 96%)    Parameters below as of 23 Oct 2023 05:26  Patient On (Oxygen Delivery Method): room air        PHYSICAL EXAM  General: adult in NAD  HEENT: clear oropharynx, anicteric sclera, pink conjunctiva  Neck: supple  CV: normal S1/S2 with no murmur rubs or gallops  Lungs: positive air movement b/l ant lungs,clear to auscultation, no wheezes, no rales  Abdomen: soft non-tender non-distended, no hepatosplenomegaly  Ext: no clubbing cyanosis or edema  Skin: no rashes and no petechiae  Neuro: alert and oriented X 4, no focal deficits  LABS:                          6.4    11.59 )-----------( 410      ( 23 Oct 2023 06:05 )             18.4         Mean Cell Volume : 91.1 fl  Mean Cell Hemoglobin : 31.7 pg  Mean Cell Hemoglobin Concentration : 34.8 gm/dL  Auto Neutrophil # : x  Auto Lymphocyte # : x  Auto Monocyte # : x  Auto Eosinophil # : x  Auto Basophil # : x  Auto Neutrophil % : x  Auto Lymphocyte % : x  Auto Monocyte % : x  Auto Eosinophil % : x  Auto Basophil % : x    Serial CBC  Hematocrit 18.4  Hemoglobin 6.4  Plat 410  RBC 2.02  WBC 11.59  Serial CBC  Hematocrit 17.9  Hemoglobin 6.1  Plat 357  RBC 1.99  WBC 11.24  Serial CBC  Hematocrit 16.8  Hemoglobin 5.9  Plat 260  RBC 1.92  WBC 12.15  Serial CBC  Hematocrit 17.9  Hemoglobin 6.2  Plat 239  RBC 2.01  WBC 10.82  Serial CBC  Hematocrit 12.2  Hemoglobin 4.2  Plat 190  RBC 1.37  WBC 10.06    10-23    141  |  110<H>  |  13  ----------------------------<  96  3.9   |  24  |  0.81    Ca    8.5      23 Oct 2023 06:05    TPro  7.0  /  Alb  2.9<L>  /  TBili  5.5<H>  /  DBili  x   /  AST  57<H>  /  ALT  38  /  AlkPhos  97  10-23          Reticulocyte Percent: 8.6 % (10-23 @ 06:05)  Reticulocyte Percent: 5.7 % (10-22 @ 06:40)  Reticulocyte Percent: 4.6 % (10-21 @ 05:10)  Reticulocyte Percent: 5.5 % (10-18 @ 00:56)  Iron - Total Binding Capacity.: 293 ug/dL (10-18 @ 00:56)  Ferritin: 3224 ng/mL (10-18 @ 00:56)            BLOOD SMEAR INTERPRETATION:       RADIOLOGY & ADDITIONAL STUDIES:

## 2023-10-23 NOTE — PROGRESS NOTE ADULT - REASON FOR ADMISSION
Sickle cell pain crisis

## 2023-10-23 NOTE — PROGRESS NOTE ADULT - SUBJECTIVE AND OBJECTIVE BOX
Source of information: TOLU VARGAS, Chart review  Patient language: English  : n/a    HPI:  Pt is a 44 yo M w hx sickle cell anemia (baseline Hb 5-6, follows with Dr. Opal Jordan), multiple admissions for pain crisis most recently 2023, gout, presenting with complaint of subjective fevers as well as back pain. Pt says that he ran out of home oxycodone 2 weeks ago, and has had abdominal pain since 5 days prior to presentation. 3 days prior to presentation he began to experience subjective fevers, chills, rhinorrhea, cough productive of yellow phlegm that is thick. He says the back pain has gotten worse since then.    On current evaluation, pt complains of lower back pain and abdominal pain. He does not know of any sick contacts. He denies SOB, chest pain, dysuria, diarrhea.  Of note pt has chemo port (not on chemo) which he says was replaced within the last 2 days. (18 Oct 2023 12:16)    Pt is admitted for sickle cell pain crisis, Retic count 5.5 % on admission, increased to 8.6% 10/23. S/p 1 unit PRBC 10/20. H/H stable. Pain is well known to pain service. Last seen by pain management in 2023. Pain consulted for generalized joint pain 10/19. Pt seen and examined at bedside. Pt laying in bed, no acute distress. Reports generalized joint pain resolved. States he is now experiencing chronic lumbar back pain. Currently rating lumbar back pain score 7/10 and tolerable SCALE USED: (1-10 VNRS). Pt describes pain as aching, non- radiating, alleviated by IV dilaudid pain medication, exacerbated by movement. Pt is opioid dependent. On oxycodone 30mg PO 4x/day (verified on istop). Reports taking on average 1-2 tabs oxycodone 30mg PO/ day. Pt tolerating PO diet. Denies lethargy, chest pain, SOB, nausea, vomiting, constipation. Reports last BM 10/22. Patient stated goal for pain control: to be able to take deep breaths, get out of bed to chair and ambulate with tolerable pain control. Plan to be discharged home today.     PAST MEDICAL & SURGICAL HISTORY:  Sickle cell anemia      Gout      History of cholecystectomy          FAMILY HISTORY:  Family history of sickle cell trait (Father, Mother)    Patient's father is  (Father)    Patient's mother is  (Mother)        Social History:  lives at home, not working (18 Oct 2023 12:16)   [X ] Denies ETOH use, illicit drug use   [x] hx marijuana smoking    Allergies    penicillin (Pruritus)  hydroxyurea (Other)  piperacillin-tazobactam (Urticaria)  ceftriaxone (Anaphylaxis)    MEDICATIONS  (STANDING):  allopurinol 100 milliGRAM(s) Oral daily  folic acid 1 milliGRAM(s) Oral every 24 hours  heparin   Injectable 5000 Unit(s) SubCutaneous every 12 hours  lidocaine   4% Patch 1 Patch Transdermal daily  naloxone Injectable 0.4 milliGRAM(s) IV Push once  pantoprazole    Tablet 40 milliGRAM(s) Oral before breakfast  polyethylene glycol 3350 17 Gram(s) Oral daily  senna 2 Tablet(s) Oral at bedtime    MEDICATIONS  (PRN):  acetaminophen     Tablet .. 1000 milliGRAM(s) Oral every 8 hours PRN Moderate Pain (4 - 6)  bisacodyl 5 milliGRAM(s) Oral daily PRN Constipation  diphenhydrAMINE 50 milliGRAM(s) Oral every 6 hours PRN Rash and/or Itching  guaiFENesin Oral Liquid (Sugar-Free) 100 milliGRAM(s) Oral every 6 hours PRN Cough  HYDROmorphone  Injectable 2 milliGRAM(s) IV Push every 3 hours PRN Severe Pain (7 - 10)  ondansetron Injectable 4 milliGRAM(s) IV Push every 8 hours PRN Nausea and/or Vomiting      Vital Signs Last 24 Hrs  T(C): 36.5 (23 Oct 2023 05:26), Max: 36.9 (22 Oct 2023 20:23)  T(F): 97.7 (23 Oct 2023 05:26), Max: 98.4 (22 Oct 2023 20:23)  HR: 68 (23 Oct 2023 05:26) (68 - 88)  BP: 145/83 (23 Oct 2023 05:26) (134/75 - 150/81)  BP(mean): --  RR: 18 (23 Oct 2023 05:26) (17 - 18)  SpO2: 95% (23 Oct 2023 05:26) (95% - 96%)    Parameters below as of 23 Oct 2023 05:26  Patient On (Oxygen Delivery Method): room air      SARS-CoV-2: NotDetec (13 Oct 2023 00:57)    LABS: Reviewed                          6.4    11.59 )-----------( 410      ( 23 Oct 2023 06:05 )             18.4     10-    141  |  110<H>  |  13  ----------------------------<  96  3.9   |  24  |  0.81    Ca    8.5      23 Oct 2023 06:05    TPro  7.0  /  Alb  2.9<L>  /  TBili  5.5<H>  /  DBili  x   /  AST  57<H>  /  ALT  38  /  AlkPhos  97  10      LIVER FUNCTIONS - ( 23 Oct 2023 06:05 )  Alb: 2.9 g/dL / Pro: 7.0 g/dL / ALK PHOS: 97 U/L / ALT: 38 U/L DA / AST: 57 U/L / GGT: x           Urinalysis Basic - ( 23 Oct 2023 06:05 )    Color: x / Appearance: x / SG: x / pH: x  Gluc: 96 mg/dL / Ketone: x  / Bili: x / Urobili: x   Blood: x / Protein: x / Nitrite: x   Leuk Esterase: x / RBC: x / WBC x   Sq Epi: x / Non Sq Epi: x / Bacteria: x    SARS-CoV-2: NotDetec (13 Oct 2023 00:57)    Radiology: Reviewed.   < from: CT Abdomen and Pelvis w/ IV Cont (10.18.23 @ 10:27) >  ACC: [id number] EXAM:  CT ABDOMEN AND PELVIS IC   ORDERED BY: FÁTIMA WETZEL     PROCEDURE DATE:  10/18/2023          INTERPRETATION:  CLINICAL INFORMATION: Abdominal pain and sepsis    COMPARISON: 2022    CONTRAST/COMPLICATIONS:  IV Contrast: Omnipaque 350  90 cc administered   10 cc discarded  Oral Contrast: NONE  Complications: None reported at time of study completion    PROCEDURE:  CT of the Abdomen and Pelvis was performed.  Sagittal and coronal reformats were performed.    FINDINGS:  LOWER CHEST: Basilar atelectasis/scarring. Cardiomegaly. Mildly enlarged   mediastinal and cardiophrenic angle lymph nodes, similar to prior.    LIVER: Mild nodular contour.  BILE DUCTS: Normal caliber.  GALLBLADDER: Cholecystectomy.  SPLEEN: Atrophic and calcified spleen.  PANCREAS: Within normal limits.  ADRENALS: Within normal limits.  KIDNEYS/URETERS: Cyst and too small to further characterize   hypodensities. No hydronephrosis. Duplex bilateral renal collecting   systems.    BLADDER: Within normal limits.  REPRODUCTIVE ORGANS: Prostate within normal limits.    BOWEL: No bowel obstruction. Appendix is within normal limits.  PERITONEUM: Trace pelvic fluid.  VESSELS: Atherosclerotic changes.  RETROPERITONEUM/LYMPH NODES: Enlarged upper abdominal gastrohepatic,   portacaval, patrick hepatis, and peripancreatic lymph nodes, similar to   prior.  ABDOMINAL WALL: Fat-containing left inguinal hernia.  BONES: Degenerative changes. Stable wedge compression deformity of T12   vertebral body. Stable mild vertebral body height loss of L2.    IMPRESSION:  Redemonstrated upper abdominal adenopathy.        --- End of Report ---            MEKHI ADHIKARI MD; Attending Radiologist  This document has been electronically signed. Oct 18 2023 10:42AM    < end of copied text >    < from: Xray Chest 1 View-PORTABLE IMMEDIATE (Xray Chest 1 View-PORTABLE IMMEDIATE .) (10.18.23 @ 04:44) >    ACC: [id number] EXAM:  XR CHEST PORTABLE IMMED 1V   ORDERED BY: FÁTIMA WETZEL     PROCEDURE DATE:  10/18/2023          INTERPRETATION:  AP chest on 2023 at 4:33 AM. Patient has   sepsis.    Heart likely enlarged. Right-sided port again noted.    There is a persistent linear density at the right base and several others   at the left base.    Chest is similar to May 19 is year.    IMPRESSION: Bibasilar linear densities are stable as is the chest.    --- End of Report ---            RAMO WERNER MD; Attending Radiologist  This document has been electronically signed. Oct 18 2023 10:58AM    < end of copied text >      ORT Score -   Family Hx of substance abuse	Female	      Male  Alcohol 	                                           1                     3  Illegal drugs	                                   2                     3  Rx drugs                                           4 	                  4  Personal Hx of substance abuse		  Alcohol 	                                          3	                  3  Illegal drugs                                     4	                  4  Rx drugs                                            5 	                  5  Age between 16- 45 years	           1                     1  hx preadolescent sexual abuse	   3 	                  0  Psychological disease		  ADD, OCD, bipolar, schizophrenia   2	          2  Depression                                           1 	          1  Total: 1    a score of 3 or lower indicates low risk for opioid abuse		  a score of 4-7 indicates moderate risk for opioid abuse		  a score of 8 or higher indicates high risk for opioid abuse    REVIEW OF SYSTEMS:  CONSTITUTIONAL: No fever No fatigue  HEENT:  No difficulty hearing, no change in vision  NECK: No pain or stiffness  RESPIRATORY: No cough, wheezing, chills or hemoptysis; No shortness of breath  CARDIOVASCULAR: No chest pain, palpitations, dizziness, or leg swelling  GASTROINTESTINAL: No loss of appetite, decreased PO intake. No abdominal or epigastric pain. No nausea, vomiting; No diarrhea or constipation.   GENITOURINARY: No dysuria, frequency, hematuria, retention or incontinence  MUSCULOSKELETAL: + hx gout + lumbar back pain. + hx chronic back and joint pain; + b/l ankle swelling; no upper or lower motor strength weakness, no saddle anesthesia, bowel/bladder incontinence, no falls   NEURO: No headaches, No numbness/tingling b/l LE, No weakness    PHYSICAL EXAM:  GENERAL:  Alert & Oriented X4, cooperative, NAD, Good concentration. Speech is clear.   RESPIRATORY: Respirations even and unlabored. Clear to auscultation bilaterally; No rales, rhonchi, wheezing, or rubs  CARDIOVASCULAR: Normal S1/S2, regular rate and rhythm; No murmurs, rubs, or gallops. No JVD.   GASTROINTESTINAL:  Soft, Nontender, Nondistended; Bowel sounds present  PERIPHERAL VASCULAR:  Extremities warm with b/l ankle edema. 2+ Peripheral Pulses, No cyanosis, No calf tenderness  MUSCULOSKELETAL: Motor Strength 5/5 B/L upper and lower extremities; moves all extremities equally against gravity; ROM intact; negative SLR; + generalized tenderness on palpation of lumbar back   SKIN: Warm, dry, intact. No rashes, lesions, scars or wounds.     Risk factors associated with adverse outcomes related to opioid treatment  [ ]  Concurrent benzodiazepine use  [ ]  History/ Active substance use or alcohol use disorder  [ ] Psychiatric co-morbidity  [ ] Sleep apnea  [ ] COPD  [ ] BMI> 35  [ ] Liver dysfunction  [ ] Renal dysfunction  [ ] CHF  [ ] Smoker  [ ]  Age > 60 years    [X ]  [place]  Reviewed and Copied to Chart. See below.    Plan of care and goal oriented pain management treatment options were discussed with patient and /or primary care giver; all questions and concerns were addressed and care was aligned with patient's wishes.    Educated patient on goal oriented pain management treatment options     10-23-23 @ 11:19

## 2023-10-23 NOTE — DISCHARGE NOTE NURSING/CASE MANAGEMENT/SOCIAL WORK - NSDCPEFALRISK_GEN_ALL_CORE
For information on Fall & Injury Prevention, visit: [email or web address] OR  [email or web address] OR  https://www.cdc.gov/steadi/patient.html

## 2023-11-10 NOTE — H&P ADULT - NSHPPHYSICALEXAM_GEN_ALL_CORE
How Severe Is It?: mild
Is This A New Presentation, Or A Follow-Up?: Discoloration
T(C): 37.3 (28 Feb 2022 10:53), Max: 37.3 (28 Feb 2022 10:53)  T(F): 99.2 (28 Feb 2022 10:53), Max: 99.2 (28 Feb 2022 10:53)  HR: 100 (28 Feb 2022 10:53) (91 - 112)  BP: 145/74 (28 Feb 2022 10:53) (143/82 - 145/84)  BP(mean): --  ABP: --  ABP(mean): --  RR: 20 (28 Feb 2022 10:53) (20 - 22)  SpO2: 95% (28 Feb 2022 10:53) (90% - 95%)  GENERAL: in pain , icteric  HEAD:  Atraumatic, Normocephalic  EYES: EOMI, PERRLA, conjunctiva and sclera icteric  NECK: Supple, No JVD  CHEST/LUNG: Clear to auscultation bilaterally; No wheeze  HEART: Regular rate and rhythm; No murmurs, rubs, or gallops  ABDOMEN: Soft, generalized tenderness, Nondistended; Bowel sounds present  EXTREMITIES:  2+ Peripheral Pulses, No clubbing, cyanosis, or edema  PSYCH: AAOx3  NEUROLOGY: non-focal  SKIN: jaundiced

## 2023-11-28 NOTE — ED PROVIDER NOTE - DURATION
Writer called pt. No answer. Left message to return call. Please inform pt of message below. Lab order khadijah up.   7pm/yesterday

## 2023-12-14 NOTE — PROVIDER CONTACT NOTE (CRITICAL VALUE NOTIFICATION) - NAME OF MD/NP/PA/DO NOTIFIED:
MD Baker
TATIANA NEAL NP via TEAMS text msg
STACY Jordan
Bleeding that does not stop/Fever greater than (need to indicate Fahrenheit or Celsius)/Inability to tolerate liquids or foods

## 2023-12-15 ENCOUNTER — INPATIENT (INPATIENT)
Facility: HOSPITAL | Age: 43
LOS: 2 days | Discharge: HOME | DRG: 812 | End: 2023-12-18
Attending: STUDENT IN AN ORGANIZED HEALTH CARE EDUCATION/TRAINING PROGRAM | Admitting: STUDENT IN AN ORGANIZED HEALTH CARE EDUCATION/TRAINING PROGRAM
Payer: MEDICAID

## 2023-12-15 VITALS
WEIGHT: 194.01 LBS | HEIGHT: 69 IN | OXYGEN SATURATION: 90 % | RESPIRATION RATE: 22 BRPM | TEMPERATURE: 98 F | HEART RATE: 101 BPM | SYSTOLIC BLOOD PRESSURE: 128 MMHG | DIASTOLIC BLOOD PRESSURE: 81 MMHG

## 2023-12-15 DIAGNOSIS — N17.9 ACUTE KIDNEY FAILURE, UNSPECIFIED: ICD-10-CM

## 2023-12-15 DIAGNOSIS — Z90.49 ACQUIRED ABSENCE OF OTHER SPECIFIED PARTS OF DIGESTIVE TRACT: Chronic | ICD-10-CM

## 2023-12-15 DIAGNOSIS — D57.00 HB-SS DISEASE WITH CRISIS, UNSPECIFIED: ICD-10-CM

## 2023-12-15 DIAGNOSIS — Z29.9 ENCOUNTER FOR PROPHYLACTIC MEASURES, UNSPECIFIED: ICD-10-CM

## 2023-12-15 DIAGNOSIS — M10.9 GOUT, UNSPECIFIED: ICD-10-CM

## 2023-12-15 DIAGNOSIS — R74.01 ELEVATION OF LEVELS OF LIVER TRANSAMINASE LEVELS: ICD-10-CM

## 2023-12-15 DIAGNOSIS — J06.9 ACUTE UPPER RESPIRATORY INFECTION, UNSPECIFIED: ICD-10-CM

## 2023-12-15 LAB
ALBUMIN SERPL ELPH-MCNC: 3.2 G/DL — LOW (ref 3.5–5)
ALBUMIN SERPL ELPH-MCNC: 3.2 G/DL — LOW (ref 3.5–5)
ALP SERPL-CCNC: 137 U/L — HIGH (ref 40–120)
ALP SERPL-CCNC: 137 U/L — HIGH (ref 40–120)
ALT FLD-CCNC: 89 U/L DA — HIGH (ref 10–60)
ALT FLD-CCNC: 89 U/L DA — HIGH (ref 10–60)
ANION GAP SERPL CALC-SCNC: 3 MMOL/L — LOW (ref 5–17)
ANION GAP SERPL CALC-SCNC: 3 MMOL/L — LOW (ref 5–17)
ANION GAP SERPL CALC-SCNC: 6 MMOL/L — SIGNIFICANT CHANGE UP (ref 5–17)
ANION GAP SERPL CALC-SCNC: 6 MMOL/L — SIGNIFICANT CHANGE UP (ref 5–17)
ANISOCYTOSIS BLD QL: SLIGHT — SIGNIFICANT CHANGE UP
ANISOCYTOSIS BLD QL: SLIGHT — SIGNIFICANT CHANGE UP
APPEARANCE UR: CLEAR — SIGNIFICANT CHANGE UP
APPEARANCE UR: CLEAR — SIGNIFICANT CHANGE UP
AST SERPL-CCNC: 171 U/L — HIGH (ref 10–40)
AST SERPL-CCNC: 171 U/L — HIGH (ref 10–40)
BACTERIA # UR AUTO: ABNORMAL /HPF
BACTERIA # UR AUTO: ABNORMAL /HPF
BASOPHILS # BLD AUTO: 0 K/UL — SIGNIFICANT CHANGE UP (ref 0–0.2)
BASOPHILS # BLD AUTO: 0 K/UL — SIGNIFICANT CHANGE UP (ref 0–0.2)
BASOPHILS # BLD AUTO: 0.14 K/UL — SIGNIFICANT CHANGE UP (ref 0–0.2)
BASOPHILS # BLD AUTO: 0.14 K/UL — SIGNIFICANT CHANGE UP (ref 0–0.2)
BASOPHILS NFR BLD AUTO: 0 % — SIGNIFICANT CHANGE UP (ref 0–2)
BASOPHILS NFR BLD AUTO: 0 % — SIGNIFICANT CHANGE UP (ref 0–2)
BASOPHILS NFR BLD AUTO: 1.6 % — SIGNIFICANT CHANGE UP (ref 0–2)
BASOPHILS NFR BLD AUTO: 1.6 % — SIGNIFICANT CHANGE UP (ref 0–2)
BILIRUB DIRECT SERPL-MCNC: 2.3 MG/DL — HIGH (ref 0–0.3)
BILIRUB DIRECT SERPL-MCNC: 2.3 MG/DL — HIGH (ref 0–0.3)
BILIRUB INDIRECT FLD-MCNC: 2.6 MG/DL — HIGH (ref 0.2–1)
BILIRUB INDIRECT FLD-MCNC: 2.6 MG/DL — HIGH (ref 0.2–1)
BILIRUB SERPL-MCNC: 4.9 MG/DL — HIGH (ref 0.2–1.2)
BILIRUB UR-MCNC: NEGATIVE — SIGNIFICANT CHANGE UP
BILIRUB UR-MCNC: NEGATIVE — SIGNIFICANT CHANGE UP
BLD GP AB SCN SERPL QL: SIGNIFICANT CHANGE UP
BLD GP AB SCN SERPL QL: SIGNIFICANT CHANGE UP
BUN SERPL-MCNC: 21 MG/DL — HIGH (ref 7–18)
BUN SERPL-MCNC: 21 MG/DL — HIGH (ref 7–18)
BUN SERPL-MCNC: 24 MG/DL — HIGH (ref 7–18)
BUN SERPL-MCNC: 24 MG/DL — HIGH (ref 7–18)
CALCIUM SERPL-MCNC: 8 MG/DL — LOW (ref 8.4–10.5)
CALCIUM SERPL-MCNC: 8 MG/DL — LOW (ref 8.4–10.5)
CALCIUM SERPL-MCNC: 9.2 MG/DL — SIGNIFICANT CHANGE UP (ref 8.4–10.5)
CALCIUM SERPL-MCNC: 9.2 MG/DL — SIGNIFICANT CHANGE UP (ref 8.4–10.5)
CHLORIDE SERPL-SCNC: 109 MMOL/L — HIGH (ref 96–108)
CHLORIDE SERPL-SCNC: 109 MMOL/L — HIGH (ref 96–108)
CHLORIDE SERPL-SCNC: 111 MMOL/L — HIGH (ref 96–108)
CHLORIDE SERPL-SCNC: 111 MMOL/L — HIGH (ref 96–108)
CO2 SERPL-SCNC: 21 MMOL/L — LOW (ref 22–31)
CO2 SERPL-SCNC: 21 MMOL/L — LOW (ref 22–31)
CO2 SERPL-SCNC: 25 MMOL/L — SIGNIFICANT CHANGE UP (ref 22–31)
CO2 SERPL-SCNC: 25 MMOL/L — SIGNIFICANT CHANGE UP (ref 22–31)
COLOR SPEC: SIGNIFICANT CHANGE UP
COLOR SPEC: SIGNIFICANT CHANGE UP
CREAT SERPL-MCNC: 1.06 MG/DL — SIGNIFICANT CHANGE UP (ref 0.5–1.3)
CREAT SERPL-MCNC: 1.06 MG/DL — SIGNIFICANT CHANGE UP (ref 0.5–1.3)
CREAT SERPL-MCNC: 1.16 MG/DL — SIGNIFICANT CHANGE UP (ref 0.5–1.3)
CREAT SERPL-MCNC: 1.16 MG/DL — SIGNIFICANT CHANGE UP (ref 0.5–1.3)
DIFF PNL FLD: ABNORMAL
DIFF PNL FLD: ABNORMAL
EGFR: 80 ML/MIN/1.73M2 — SIGNIFICANT CHANGE UP
EGFR: 80 ML/MIN/1.73M2 — SIGNIFICANT CHANGE UP
EGFR: 89 ML/MIN/1.73M2 — SIGNIFICANT CHANGE UP
EGFR: 89 ML/MIN/1.73M2 — SIGNIFICANT CHANGE UP
ELLIPTOCYTES BLD QL SMEAR: SLIGHT — SIGNIFICANT CHANGE UP
ELLIPTOCYTES BLD QL SMEAR: SLIGHT — SIGNIFICANT CHANGE UP
EOSINOPHIL # BLD AUTO: 0 K/UL — SIGNIFICANT CHANGE UP (ref 0–0.5)
EOSINOPHIL # BLD AUTO: 0 K/UL — SIGNIFICANT CHANGE UP (ref 0–0.5)
EOSINOPHIL # BLD AUTO: 0.22 K/UL — SIGNIFICANT CHANGE UP (ref 0–0.5)
EOSINOPHIL # BLD AUTO: 0.22 K/UL — SIGNIFICANT CHANGE UP (ref 0–0.5)
EOSINOPHIL NFR BLD AUTO: 0 % — SIGNIFICANT CHANGE UP (ref 0–6)
EOSINOPHIL NFR BLD AUTO: 0 % — SIGNIFICANT CHANGE UP (ref 0–6)
EOSINOPHIL NFR BLD AUTO: 2.5 % — SIGNIFICANT CHANGE UP (ref 0–6)
EOSINOPHIL NFR BLD AUTO: 2.5 % — SIGNIFICANT CHANGE UP (ref 0–6)
FERRITIN SERPL-MCNC: 3485 NG/ML — HIGH (ref 30–400)
FERRITIN SERPL-MCNC: 3485 NG/ML — HIGH (ref 30–400)
GLUCOSE SERPL-MCNC: 114 MG/DL — HIGH (ref 70–99)
GLUCOSE SERPL-MCNC: 114 MG/DL — HIGH (ref 70–99)
GLUCOSE SERPL-MCNC: 97 MG/DL — SIGNIFICANT CHANGE UP (ref 70–99)
GLUCOSE SERPL-MCNC: 97 MG/DL — SIGNIFICANT CHANGE UP (ref 70–99)
GLUCOSE UR QL: NEGATIVE MG/DL — SIGNIFICANT CHANGE UP
GLUCOSE UR QL: NEGATIVE MG/DL — SIGNIFICANT CHANGE UP
HAPTOGLOB SERPL-MCNC: <20 MG/DL — LOW (ref 34–200)
HAPTOGLOB SERPL-MCNC: <20 MG/DL — LOW (ref 34–200)
HCT VFR BLD CALC: 13.5 % — CRITICAL LOW (ref 39–50)
HCT VFR BLD CALC: 13.5 % — CRITICAL LOW (ref 39–50)
HCT VFR BLD CALC: 20.2 % — CRITICAL LOW (ref 39–50)
HCT VFR BLD CALC: 20.2 % — CRITICAL LOW (ref 39–50)
HGB BLD-MCNC: 4.7 G/DL — CRITICAL LOW (ref 13–17)
HGB BLD-MCNC: 4.7 G/DL — CRITICAL LOW (ref 13–17)
HGB BLD-MCNC: 7.2 G/DL — LOW (ref 13–17)
HGB BLD-MCNC: 7.2 G/DL — LOW (ref 13–17)
HYALINE CASTS # UR AUTO: PRESENT
HYALINE CASTS # UR AUTO: PRESENT
IMM GRANULOCYTES NFR BLD AUTO: 0.7 % — SIGNIFICANT CHANGE UP (ref 0–0.9)
IMM GRANULOCYTES NFR BLD AUTO: 0.7 % — SIGNIFICANT CHANGE UP (ref 0–0.9)
IRON SATN MFR SERPL: 248 UG/DL — HIGH (ref 65–170)
IRON SATN MFR SERPL: 248 UG/DL — HIGH (ref 65–170)
IRON SATN MFR SERPL: 91 % — HIGH (ref 20–55)
IRON SATN MFR SERPL: 91 % — HIGH (ref 20–55)
KETONES UR-MCNC: NEGATIVE MG/DL — SIGNIFICANT CHANGE UP
KETONES UR-MCNC: NEGATIVE MG/DL — SIGNIFICANT CHANGE UP
LDH SERPL L TO P-CCNC: 943 U/L — HIGH (ref 120–225)
LDH SERPL L TO P-CCNC: 943 U/L — HIGH (ref 120–225)
LEUKOCYTE ESTERASE UR-ACNC: NEGATIVE — SIGNIFICANT CHANGE UP
LEUKOCYTE ESTERASE UR-ACNC: NEGATIVE — SIGNIFICANT CHANGE UP
LYMPHOCYTES # BLD AUTO: 3.71 K/UL — HIGH (ref 1–3.3)
LYMPHOCYTES # BLD AUTO: 3.71 K/UL — HIGH (ref 1–3.3)
LYMPHOCYTES # BLD AUTO: 3.73 K/UL — HIGH (ref 1–3.3)
LYMPHOCYTES # BLD AUTO: 3.73 K/UL — HIGH (ref 1–3.3)
LYMPHOCYTES # BLD AUTO: 36 % — SIGNIFICANT CHANGE UP (ref 13–44)
LYMPHOCYTES # BLD AUTO: 36 % — SIGNIFICANT CHANGE UP (ref 13–44)
LYMPHOCYTES # BLD AUTO: 42.1 % — SIGNIFICANT CHANGE UP (ref 13–44)
LYMPHOCYTES # BLD AUTO: 42.1 % — SIGNIFICANT CHANGE UP (ref 13–44)
MANUAL SMEAR VERIFICATION: SIGNIFICANT CHANGE UP
MANUAL SMEAR VERIFICATION: SIGNIFICANT CHANGE UP
MCHC RBC-ENTMCNC: 31.3 PG — SIGNIFICANT CHANGE UP (ref 27–34)
MCHC RBC-ENTMCNC: 31.3 PG — SIGNIFICANT CHANGE UP (ref 27–34)
MCHC RBC-ENTMCNC: 31.8 PG — SIGNIFICANT CHANGE UP (ref 27–34)
MCHC RBC-ENTMCNC: 31.8 PG — SIGNIFICANT CHANGE UP (ref 27–34)
MCHC RBC-ENTMCNC: 34.8 GM/DL — SIGNIFICANT CHANGE UP (ref 32–36)
MCHC RBC-ENTMCNC: 34.8 GM/DL — SIGNIFICANT CHANGE UP (ref 32–36)
MCHC RBC-ENTMCNC: 35.6 GM/DL — SIGNIFICANT CHANGE UP (ref 32–36)
MCHC RBC-ENTMCNC: 35.6 GM/DL — SIGNIFICANT CHANGE UP (ref 32–36)
MCV RBC AUTO: 87.8 FL — SIGNIFICANT CHANGE UP (ref 80–100)
MCV RBC AUTO: 87.8 FL — SIGNIFICANT CHANGE UP (ref 80–100)
MCV RBC AUTO: 91.2 FL — SIGNIFICANT CHANGE UP (ref 80–100)
MCV RBC AUTO: 91.2 FL — SIGNIFICANT CHANGE UP (ref 80–100)
MONOCYTES # BLD AUTO: 1.08 K/UL — HIGH (ref 0–0.9)
MONOCYTES # BLD AUTO: 1.08 K/UL — HIGH (ref 0–0.9)
MONOCYTES # BLD AUTO: 1.34 K/UL — HIGH (ref 0–0.9)
MONOCYTES # BLD AUTO: 1.34 K/UL — HIGH (ref 0–0.9)
MONOCYTES NFR BLD AUTO: 12.2 % — SIGNIFICANT CHANGE UP (ref 2–14)
MONOCYTES NFR BLD AUTO: 12.2 % — SIGNIFICANT CHANGE UP (ref 2–14)
MONOCYTES NFR BLD AUTO: 13 % — SIGNIFICANT CHANGE UP (ref 2–14)
MONOCYTES NFR BLD AUTO: 13 % — SIGNIFICANT CHANGE UP (ref 2–14)
NEUTROPHILS # BLD AUTO: 3.62 K/UL — SIGNIFICANT CHANGE UP (ref 1.8–7.4)
NEUTROPHILS # BLD AUTO: 3.62 K/UL — SIGNIFICANT CHANGE UP (ref 1.8–7.4)
NEUTROPHILS # BLD AUTO: 5.26 K/UL — SIGNIFICANT CHANGE UP (ref 1.8–7.4)
NEUTROPHILS # BLD AUTO: 5.26 K/UL — SIGNIFICANT CHANGE UP (ref 1.8–7.4)
NEUTROPHILS NFR BLD AUTO: 40.9 % — LOW (ref 43–77)
NEUTROPHILS NFR BLD AUTO: 40.9 % — LOW (ref 43–77)
NEUTROPHILS NFR BLD AUTO: 51 % — SIGNIFICANT CHANGE UP (ref 43–77)
NEUTROPHILS NFR BLD AUTO: 51 % — SIGNIFICANT CHANGE UP (ref 43–77)
NITRITE UR-MCNC: NEGATIVE — SIGNIFICANT CHANGE UP
NITRITE UR-MCNC: NEGATIVE — SIGNIFICANT CHANGE UP
NRBC # BLD: 0 /100 — SIGNIFICANT CHANGE UP (ref 0–0)
NRBC # BLD: 0 /100 — SIGNIFICANT CHANGE UP (ref 0–0)
NRBC # BLD: 3 /100 WBCS — HIGH (ref 0–0)
NRBC # BLD: 3 /100 WBCS — HIGH (ref 0–0)
PH UR: 5.5 — SIGNIFICANT CHANGE UP (ref 5–8)
PH UR: 5.5 — SIGNIFICANT CHANGE UP (ref 5–8)
PLAT MORPH BLD: NORMAL — SIGNIFICANT CHANGE UP
PLAT MORPH BLD: NORMAL — SIGNIFICANT CHANGE UP
PLATELET # BLD AUTO: 297 K/UL — SIGNIFICANT CHANGE UP (ref 150–400)
PLATELET # BLD AUTO: 297 K/UL — SIGNIFICANT CHANGE UP (ref 150–400)
PLATELET # BLD AUTO: 299 K/UL — SIGNIFICANT CHANGE UP (ref 150–400)
PLATELET # BLD AUTO: 299 K/UL — SIGNIFICANT CHANGE UP (ref 150–400)
POIKILOCYTOSIS BLD QL AUTO: SLIGHT — SIGNIFICANT CHANGE UP
POIKILOCYTOSIS BLD QL AUTO: SLIGHT — SIGNIFICANT CHANGE UP
POTASSIUM SERPL-MCNC: 4.2 MMOL/L — SIGNIFICANT CHANGE UP (ref 3.5–5.3)
POTASSIUM SERPL-MCNC: 4.2 MMOL/L — SIGNIFICANT CHANGE UP (ref 3.5–5.3)
POTASSIUM SERPL-MCNC: 4.3 MMOL/L — SIGNIFICANT CHANGE UP (ref 3.5–5.3)
POTASSIUM SERPL-MCNC: 4.3 MMOL/L — SIGNIFICANT CHANGE UP (ref 3.5–5.3)
POTASSIUM SERPL-SCNC: 4.2 MMOL/L — SIGNIFICANT CHANGE UP (ref 3.5–5.3)
POTASSIUM SERPL-SCNC: 4.2 MMOL/L — SIGNIFICANT CHANGE UP (ref 3.5–5.3)
POTASSIUM SERPL-SCNC: 4.3 MMOL/L — SIGNIFICANT CHANGE UP (ref 3.5–5.3)
POTASSIUM SERPL-SCNC: 4.3 MMOL/L — SIGNIFICANT CHANGE UP (ref 3.5–5.3)
PROT SERPL-MCNC: 7.2 G/DL — SIGNIFICANT CHANGE UP (ref 6–8.3)
PROT SERPL-MCNC: 7.2 G/DL — SIGNIFICANT CHANGE UP (ref 6–8.3)
PROT UR-MCNC: 100 MG/DL
PROT UR-MCNC: 100 MG/DL
RAPID RVP RESULT: SIGNIFICANT CHANGE UP
RAPID RVP RESULT: SIGNIFICANT CHANGE UP
RBC # BLD: 1.48 M/UL — LOW (ref 4.2–5.8)
RBC # BLD: 2.3 M/UL — LOW (ref 4.2–5.8)
RBC # BLD: 2.3 M/UL — LOW (ref 4.2–5.8)
RBC # FLD: 20.4 % — HIGH (ref 10.3–14.5)
RBC # FLD: 20.4 % — HIGH (ref 10.3–14.5)
RBC # FLD: 24.8 % — HIGH (ref 10.3–14.5)
RBC # FLD: 24.8 % — HIGH (ref 10.3–14.5)
RBC BLD AUTO: ABNORMAL
RBC BLD AUTO: ABNORMAL
RBC CASTS # UR COMP ASSIST: 3 /HPF — SIGNIFICANT CHANGE UP (ref 0–4)
RBC CASTS # UR COMP ASSIST: 3 /HPF — SIGNIFICANT CHANGE UP (ref 0–4)
RETICS #: 321.9 K/UL — HIGH (ref 25–125)
RETICS #: 321.9 K/UL — HIGH (ref 25–125)
RETICS/RBC NFR: 21.8 % — HIGH (ref 0.5–2.5)
RETICS/RBC NFR: 21.8 % — HIGH (ref 0.5–2.5)
SARS-COV-2 RNA SPEC QL NAA+PROBE: SIGNIFICANT CHANGE UP
SARS-COV-2 RNA SPEC QL NAA+PROBE: SIGNIFICANT CHANGE UP
SICKLE CELLS BLD QL SMEAR: SIGNIFICANT CHANGE UP
SICKLE CELLS BLD QL SMEAR: SIGNIFICANT CHANGE UP
SODIUM SERPL-SCNC: 136 MMOL/L — SIGNIFICANT CHANGE UP (ref 135–145)
SODIUM SERPL-SCNC: 136 MMOL/L — SIGNIFICANT CHANGE UP (ref 135–145)
SODIUM SERPL-SCNC: 139 MMOL/L — SIGNIFICANT CHANGE UP (ref 135–145)
SODIUM SERPL-SCNC: 139 MMOL/L — SIGNIFICANT CHANGE UP (ref 135–145)
SP GR SPEC: 1.01 — SIGNIFICANT CHANGE UP (ref 1–1.03)
SP GR SPEC: 1.01 — SIGNIFICANT CHANGE UP (ref 1–1.03)
TIBC SERPL-MCNC: 272 UG/DL — SIGNIFICANT CHANGE UP (ref 250–450)
TIBC SERPL-MCNC: 272 UG/DL — SIGNIFICANT CHANGE UP (ref 250–450)
TROPONIN I, HIGH SENSITIVITY RESULT: 13.6 NG/L — SIGNIFICANT CHANGE UP
TROPONIN I, HIGH SENSITIVITY RESULT: 13.6 NG/L — SIGNIFICANT CHANGE UP
UIBC SERPL-MCNC: 24 UG/DL — LOW (ref 110–370)
UIBC SERPL-MCNC: 24 UG/DL — LOW (ref 110–370)
UROBILINOGEN FLD QL: 1 MG/DL — SIGNIFICANT CHANGE UP (ref 0.2–1)
UROBILINOGEN FLD QL: 1 MG/DL — SIGNIFICANT CHANGE UP (ref 0.2–1)
WBC # BLD: 10.31 K/UL — SIGNIFICANT CHANGE UP (ref 3.8–10.5)
WBC # BLD: 10.31 K/UL — SIGNIFICANT CHANGE UP (ref 3.8–10.5)
WBC # BLD: 8.85 K/UL — SIGNIFICANT CHANGE UP (ref 3.8–10.5)
WBC # BLD: 8.85 K/UL — SIGNIFICANT CHANGE UP (ref 3.8–10.5)
WBC # FLD AUTO: 10.31 K/UL — SIGNIFICANT CHANGE UP (ref 3.8–10.5)
WBC # FLD AUTO: 10.31 K/UL — SIGNIFICANT CHANGE UP (ref 3.8–10.5)
WBC # FLD AUTO: 8.85 K/UL — SIGNIFICANT CHANGE UP (ref 3.8–10.5)
WBC # FLD AUTO: 8.85 K/UL — SIGNIFICANT CHANGE UP (ref 3.8–10.5)
WBC UR QL: 0 /HPF — SIGNIFICANT CHANGE UP (ref 0–5)
WBC UR QL: 0 /HPF — SIGNIFICANT CHANGE UP (ref 0–5)

## 2023-12-15 PROCEDURE — 71045 X-RAY EXAM CHEST 1 VIEW: CPT | Mod: 26

## 2023-12-15 PROCEDURE — 83020 HEMOGLOBIN ELECTROPHORESIS: CPT | Mod: 26

## 2023-12-15 PROCEDURE — 99223 1ST HOSP IP/OBS HIGH 75: CPT | Mod: GC

## 2023-12-15 PROCEDURE — 99285 EMERGENCY DEPT VISIT HI MDM: CPT | Mod: 25

## 2023-12-15 PROCEDURE — 93010 ELECTROCARDIOGRAM REPORT: CPT

## 2023-12-15 RX ORDER — HYDROMORPHONE HYDROCHLORIDE 2 MG/ML
2 INJECTION INTRAMUSCULAR; INTRAVENOUS; SUBCUTANEOUS ONCE
Refills: 0 | Status: DISCONTINUED | OUTPATIENT
Start: 2023-12-15 | End: 2023-12-15

## 2023-12-15 RX ORDER — ALLOPURINOL 300 MG
100 TABLET ORAL DAILY
Refills: 0 | Status: DISCONTINUED | OUTPATIENT
Start: 2023-12-15 | End: 2023-12-18

## 2023-12-15 RX ORDER — HYDROMORPHONE HYDROCHLORIDE 2 MG/ML
2 INJECTION INTRAMUSCULAR; INTRAVENOUS; SUBCUTANEOUS
Refills: 0 | Status: DISCONTINUED | OUTPATIENT
Start: 2023-12-15 | End: 2023-12-18

## 2023-12-15 RX ORDER — LANOLIN ALCOHOL/MO/W.PET/CERES
3 CREAM (GRAM) TOPICAL AT BEDTIME
Refills: 0 | Status: DISCONTINUED | OUTPATIENT
Start: 2023-12-15 | End: 2023-12-18

## 2023-12-15 RX ORDER — SODIUM CHLORIDE 9 MG/ML
1000 INJECTION INTRAMUSCULAR; INTRAVENOUS; SUBCUTANEOUS ONCE
Refills: 0 | Status: COMPLETED | OUTPATIENT
Start: 2023-12-15 | End: 2023-12-15

## 2023-12-15 RX ORDER — ACETAMINOPHEN 500 MG
650 TABLET ORAL EVERY 6 HOURS
Refills: 0 | Status: DISCONTINUED | OUTPATIENT
Start: 2023-12-15 | End: 2023-12-18

## 2023-12-15 RX ORDER — ACETAMINOPHEN 500 MG
650 TABLET ORAL ONCE
Refills: 0 | Status: COMPLETED | OUTPATIENT
Start: 2023-12-15 | End: 2023-12-15

## 2023-12-15 RX ORDER — DIPHENHYDRAMINE HCL 50 MG
25 CAPSULE ORAL ONCE
Refills: 0 | Status: COMPLETED | OUTPATIENT
Start: 2023-12-15 | End: 2023-12-15

## 2023-12-15 RX ORDER — HYDROMORPHONE HYDROCHLORIDE 2 MG/ML
2 INJECTION INTRAMUSCULAR; INTRAVENOUS; SUBCUTANEOUS
Refills: 0 | Status: DISCONTINUED | OUTPATIENT
Start: 2023-12-15 | End: 2023-12-15

## 2023-12-15 RX ORDER — ONDANSETRON 8 MG/1
4 TABLET, FILM COATED ORAL EVERY 8 HOURS
Refills: 0 | Status: DISCONTINUED | OUTPATIENT
Start: 2023-12-15 | End: 2023-12-18

## 2023-12-15 RX ORDER — LIDOCAINE 4 G/100G
1 CREAM TOPICAL ONCE
Refills: 0 | Status: COMPLETED | OUTPATIENT
Start: 2023-12-15 | End: 2023-12-15

## 2023-12-15 RX ORDER — FOLIC ACID 0.8 MG
1 TABLET ORAL EVERY 24 HOURS
Refills: 0 | Status: DISCONTINUED | OUTPATIENT
Start: 2023-12-15 | End: 2023-12-18

## 2023-12-15 RX ADMIN — SODIUM CHLORIDE 1000 MILLILITER(S): 9 INJECTION INTRAMUSCULAR; INTRAVENOUS; SUBCUTANEOUS at 04:40

## 2023-12-15 RX ADMIN — HYDROMORPHONE HYDROCHLORIDE 2 MILLIGRAM(S): 2 INJECTION INTRAMUSCULAR; INTRAVENOUS; SUBCUTANEOUS at 17:28

## 2023-12-15 RX ADMIN — Medication 650 MILLIGRAM(S): at 16:27

## 2023-12-15 RX ADMIN — HYDROMORPHONE HYDROCHLORIDE 2 MILLIGRAM(S): 2 INJECTION INTRAMUSCULAR; INTRAVENOUS; SUBCUTANEOUS at 20:18

## 2023-12-15 RX ADMIN — LIDOCAINE 1 PATCH: 4 CREAM TOPICAL at 04:40

## 2023-12-15 RX ADMIN — HYDROMORPHONE HYDROCHLORIDE 2 MILLIGRAM(S): 2 INJECTION INTRAMUSCULAR; INTRAVENOUS; SUBCUTANEOUS at 17:45

## 2023-12-15 RX ADMIN — Medication 1 MILLIGRAM(S): at 12:19

## 2023-12-15 RX ADMIN — HYDROMORPHONE HYDROCHLORIDE 2 MILLIGRAM(S): 2 INJECTION INTRAMUSCULAR; INTRAVENOUS; SUBCUTANEOUS at 20:48

## 2023-12-15 RX ADMIN — Medication 25 MILLIGRAM(S): at 07:29

## 2023-12-15 RX ADMIN — Medication 650 MILLIGRAM(S): at 15:27

## 2023-12-15 RX ADMIN — HYDROMORPHONE HYDROCHLORIDE 2 MILLIGRAM(S): 2 INJECTION INTRAMUSCULAR; INTRAVENOUS; SUBCUTANEOUS at 12:48

## 2023-12-15 RX ADMIN — HYDROMORPHONE HYDROCHLORIDE 2 MILLIGRAM(S): 2 INJECTION INTRAMUSCULAR; INTRAVENOUS; SUBCUTANEOUS at 04:40

## 2023-12-15 RX ADMIN — HYDROMORPHONE HYDROCHLORIDE 2 MILLIGRAM(S): 2 INJECTION INTRAMUSCULAR; INTRAVENOUS; SUBCUTANEOUS at 07:30

## 2023-12-15 RX ADMIN — Medication 650 MILLIGRAM(S): at 07:59

## 2023-12-15 RX ADMIN — Medication 650 MILLIGRAM(S): at 07:29

## 2023-12-15 RX ADMIN — Medication 25 MILLIGRAM(S): at 15:27

## 2023-12-15 RX ADMIN — HYDROMORPHONE HYDROCHLORIDE 2 MILLIGRAM(S): 2 INJECTION INTRAMUSCULAR; INTRAVENOUS; SUBCUTANEOUS at 08:00

## 2023-12-15 RX ADMIN — HYDROMORPHONE HYDROCHLORIDE 2 MILLIGRAM(S): 2 INJECTION INTRAMUSCULAR; INTRAVENOUS; SUBCUTANEOUS at 22:46

## 2023-12-15 RX ADMIN — HYDROMORPHONE HYDROCHLORIDE 2 MILLIGRAM(S): 2 INJECTION INTRAMUSCULAR; INTRAVENOUS; SUBCUTANEOUS at 14:22

## 2023-12-15 RX ADMIN — HYDROMORPHONE HYDROCHLORIDE 2 MILLIGRAM(S): 2 INJECTION INTRAMUSCULAR; INTRAVENOUS; SUBCUTANEOUS at 07:29

## 2023-12-15 RX ADMIN — HYDROMORPHONE HYDROCHLORIDE 2 MILLIGRAM(S): 2 INJECTION INTRAMUSCULAR; INTRAVENOUS; SUBCUTANEOUS at 12:18

## 2023-12-15 RX ADMIN — HYDROMORPHONE HYDROCHLORIDE 2 MILLIGRAM(S): 2 INJECTION INTRAMUSCULAR; INTRAVENOUS; SUBCUTANEOUS at 22:19

## 2023-12-15 RX ADMIN — Medication 100 MILLIGRAM(S): at 12:19

## 2023-12-15 NOTE — H&P ADULT - PROBLEM SELECTOR PLAN 3
Patient with symptoms of cough and chills, CXR wnl  - f/u RVP On admission, patient with , ALT 89,   likely 2/2 to acute sickle cell crisis vs chronic NAFLD    - f/u RUQ US  -f/u lipid panel in AM

## 2023-12-15 NOTE — H&P ADULT - ASSESSMENT
43-year-old male with PMHx of gout, sickle cell disease (baseline Hb 6, follows with Dr. Jordan) with multiple admissions for pain crisis presented to the ED with diffuse pain and Hgb of 4.7. Admitted for sickle cell crisis and anemia.  43-year-old male with PMHx of gout, sickle cell disease (baseline Hb 6, follows with Dr. Jordan) with multiple admissions for pain crisis presented to the ED with diffuse pain and Hgb of 4.7. Admitted to medicine for sickle cell crisis and anemia.

## 2023-12-15 NOTE — ED ADULT NURSE REASSESSMENT NOTE - NS ED NURSE REASSESS COMMENT FT1
I called blood bank for pts blood but blood bank said it will be another two hours until it is ready.

## 2023-12-15 NOTE — H&P ADULT - PROBLEM SELECTOR PLAN 1
On admission, patient with Hgb 4.7 with elevated reticulocyte count, LDH, total iron, ferritin, bili  Baseline 5-6  s/p 2 units PRBCs in the ED  - f/u repeat CBC after transfusion  - f/u haptoglobin  - pain regimen: dilaudid 2 mg q2 prn  - heme/onc consulted On admission, patient with Hgb 4.7 with elevated reticulocyte count, LDH, total iron, ferritin, bili  spontaneous vs triggered, likely 2/2 URTI    Baseline Hb 5-6  s/p 2 units PRBCs in the ED  - f/u repeat CBC after transfusion  - f/u haptoglobin  - pain regimen: dilaudid 2 mg q2 prn  - PRN O2 NC for dyspnea  -f/u iron studies(  to monitor for overload )  - heme/onc consulted- Dr. Jordan- f/u recs in regards to  Hb transfusion cut-off and trending hemolysis labs  -maintain active T & S On admission, patient with Hgb 4.7 with elevated reticulocyte count, LDH, total iron, ferritin, bili  spontaneous vs triggered, likely 2/2 URTI    Baseline Hb 5-6  s/p 2 units PRBCs in the ED  - f/u repeat CBC after transfusion 21:00  - f/u haptoglobin  - pain regimen: dilaudid 2 mg q2 prn  - PRN O2 NC for dyspnea  -f/u iron studies(  to monitor for overload )  - heme/onc consulted- Dr. Jordan- f/u recs in regards to  Hb transfusion cut-off and trending hemolysis labs  -maintain active T & S

## 2023-12-15 NOTE — H&P ADULT - HISTORY OF PRESENT ILLNESS
43-year-old male with PMHx of sickle cell anemia (baseline Hb 6, follows with Dr. Jordan) with multiple admissions for pain crisis and gout presented to the ED c/o back pain and leg pain for the past few days. He went to see Dr. Jordan and had a hemoglobin of 4.7 in the office.  Reports that the pain is similar to his prior sickle cell crises. Reports that he recently had dry cough, chills, and sweating for the past week at home. Unaware of any sick contacts. He denies SOB, chest pain, dysuria, nausea, vomiting, diarrhea, constipation. Denies any prior transfusion reactions. Went to [place] 2 weeks ago, otherwise no recent travel. Of note pt has a port for transfusions.     In the ED, pt's vitals were T 98.4 F, , /81, RR 90% on RA. Patient received 1L NS and Dilaudid for pain.   Labs significant for Hgb of .7     Patient reports his pain is improved - yesterday, it was 15/10 while currently, it is 8/10 in severity.   43-year-old male with PMHx of sickle cell anemia (baseline Hb 6, follows with Dr. Jordan) with multiple admissions for pain crisis and gout presented to the ED c/o back pain and leg pain for the past few days. He went to see Dr. Jordan and had a hemoglobin of 4.7 in the office.  Reports that the pain is similar to his prior sickle cell crises. Reports that he recently had dry cough, chills, and sweating for the past week at home. Unaware of any sick contacts. He denies SOB, chest pain, dysuria, nausea, vomiting, diarrhea, constipation.  Has bilateral lower extremity edema that comes and goes, patient states that he has had this for many years. Went to [place] 2 weeks ago, otherwise no recent travel. Of note pt has a port for transfusions. Denies any prior transfusion reactions.    In the ED, pt's vitals were T 98.4 F, , /81, RR 90% on RA. Patient received 1L NS and Dilaudid for pain.   Labs significant for Hgb of 4.7     Patient reports his pain is improved - yesterday, it was 15/10 while currently, it is 8/10 in severity.   43-year-old male with PMHx of gout, sickle cell disease (baseline Hb 6, follows with Dr. Jordan) with multiple admissions for pain crisis presented to the ED c/o back pain and leg pain for the past few days.  Reports that the pain is similar to his prior sickle cell crises. Reports that he recently had dry cough, chills, and sweating for the past week at home. Unaware of any sick contacts. He denies SOB, chest pain, palpitations, hematuria, dysuria, nausea, vomiting, diarrhea, constipation.  He went to see Dr. Jordan outpatient today and had a hemoglobin of 4.7 and was told to come to the ED. Went to [place] 2 weeks ago, otherwise no recent travel. Of note pt has a port for transfusions. Denies any prior transfusion reactions.    In the ED, pt's vitals were T 98.4 F, , /81, RR 90% on RA. S/p 1L NS and 2x Dilaudid.   Labs significant for Hgb of 4.7, positive labs for hemolysis, transaminitis. Troponin wnl. CXR wnl.     On current evaluation, patient reports his pain is improved - yesterday, it was 15/10 and now it is 8/10 in severity.  Has bilateral lower extremity edema that comes and goes, patient states that he has had this for many years.

## 2023-12-15 NOTE — H&P ADULT - ATTENDING COMMENTS
HPI on admission: 43-year-old male with PMHx of gout, sickle cell disease (baseline Hb 6, follows with Dr. Jordan) with multiple admissions for pain crisis presented to the ED c/o back pain and leg pain for the past few days.  Reports that the pain is similar to his prior sickle cell crises. Reports that he recently had dry cough, chills, and sweating for the past week at home. Unaware of any sick contacts. He denies SOB, chest pain, palpitations, hematuria, dysuria, nausea, vomiting, diarrhea, constipation.  He went to see Dr. Jordan outpatient today and had a hemoglobin of 4.7 and was told to come to the ED. Went to [place] 2 weeks ago, otherwise no recent travel. Of note pt has a port for transfusions. Denies any prior transfusion reactions.    Patient seen and examined in the afternoon in the ED, Reports in 8/10 pain. Current pain preceded by upper resp infection symptoms. He has periodic er visits for blood transfusions. HPI on admission: 43-year-old male with PMHx of gout, sickle cell disease (baseline Hb 6, follows with Dr. Jordan) with multiple admissions for pain crisis presented to the ED c/o back pain and leg pain for the past few days.  Reports that the pain is similar to his prior sickle cell crises. Reports that he recently had dry cough, chills, and sweating for the past week at home. Unaware of any sick contacts. He denies SOB, chest pain, palpitations, hematuria, dysuria, nausea, vomiting, diarrhea, constipation.  He went to see Dr. Jordan outpatient today and had a hemoglobin of 4.7 and was told to come to the ED. Went to [place] 2 weeks ago, otherwise no recent travel. Of note pt has a port for transfusions. Denies any prior transfusion reactions.    Patient seen and examined in the afternoon in the ED, Reports in 8/10 pain. Current pain preceded by upper resp infection symptoms. He has periodic er visits for blood transfusions. Current pain similar to previous episodes.    Vital Signs Last 24 Hrs  T(C): 36.5 (15 Dec 2023 20:58), Max: 36.9 (15 Dec 2023 03:47)  T(F): 97.7 (15 Dec 2023 20:58), Max: 98.5 (15 Dec 2023 07:36)  HR: 74 (15 Dec 2023 20:58) (70 - 101)  BP: 162/94 (15 Dec 2023 20:58) (128/72 - 162/94)  BP(mean): --  RR: 18 (15 Dec 2023 20:58) (16 - 22)  SpO2: 95% (15 Dec 2023 20:58) (90% - 95%)    Parameters below as of 15 Dec 2023 20:58  Patient On (Oxygen Delivery Method): room air    Physical: Obvious jaundiced, aaox3, abdomen soft nontender, right chest wall tenderness, pitting edema left lower ext 2+, 1 on right    # Sickle Cell Crisis  - transfuse, maintain baseline hgb ~5-6  - pain control  - Primary team discussed with Dr. Jordan-  - Check US RUQ    #Upper respiratory infection  - rapid rvp and covid negative  - cxr with linear density but also has been shown previously  - hold antibiotics for now  - check procal, reassess    #MATA  -Cr elevated from baseline  -active hemolysis   - continue monitoring  - Avoid further isotonic fluids- pulm congestion      #Transaminitis  - in the setting of active hemolysis  - need to trend BMP + hepatic function panel      Reviewed labs including CBC and CMP, reviewed chest xray reveiwed previous admissions and imaging. Primary team discussed with Dr. Julian olson

## 2023-12-15 NOTE — H&P ADULT - NSHPPHYSICALEXAM_GEN_ALL_CORE
PHYSICAL EXAM:  GENERAL: NAD, speaks in full sentences, no signs of respiratory distress  HEAD:  Atraumatic, Normocephalic  EYES: EOMI, PERRLA, conjunctiva and sclera clear  NECK: Supple, No JVD  CHEST/LUNG: Clear to auscultation bilaterally; No wheeze; No crackles; No accessory muscles used  HEART: Regular rate and rhythm; No murmurs;   ABDOMEN: Soft, Nontender, Nondistended; Bowel sounds present; No guarding  EXTREMITIES:  2+ Peripheral Pulses, No cyanosis or edema  PSYCH: AAOx3  NEUROLOGY: non-focal  SKIN: No rashes or lesions PHYSICAL EXAM:  GENERAL: NAD, speaks in full sentences, no signs of respiratory distress  HEAD:  Atraumatic, Normocephalic  EYES: EOMI, PERRLA, conjunctiva and sclera clear  NECK: Supple, No JVD  CHEST/LUNG: Clear to auscultation bilaterally; No wheeze; No crackles; No accessory muscles used  HEART: Regular rate and rhythm; No murmurs;   ABDOMEN: Soft, Nontender, Nondistended; Bowel sounds present; No guarding  EXTREMITIES:  2+ Peripheral Pulses, No cyanosis or edema  PSYCH: AAOx3  NEUROLOGY: non-focal    SKIN: No rashes or lesions PHYSICAL EXAM:  GENERAL: NAD, speaks in full sentences, no signs of respiratory distress  HEAD:  Atraumatic, Normocephalic  EYES: EOMI, PERRLA, conjunctiva and sclera clear  NECK: Supple, No JVD  CHEST/LUNG: Clear to auscultation bilaterally; No wheeze; No crackles; No accessory muscles used  HEART: Regular rate and rhythm; No murmurs;   ABDOMEN: Soft, Nontender, Nondistended; Bowel sounds present; No guarding  EXTREMITIES:  2+ Peripheral Pulses, No cyanosis. _  PSYCH: AAOx3  NEUROLOGY: non-focal    SKIN: No rashes or lesions PHYSICAL EXAM:  GENERAL: NAD, speaks in full sentences, no signs of respiratory distress  HEAD:  Atraumatic, Normocephalic  EYES: EOMI, PERRLA, conjunctiva and sclera clear  NECK: Supple, No JVD  CHEST/LUNG: Clear to auscultation bilaterally; No wheeze; No crackles; No accessory muscles used  HEART: Regular rate and rhythm; No murmurs;   ABDOMEN: Soft, Nontender, Nondistended; Bowel sounds present; No guarding  BACK: + diffuse musculoskeletal tenderness of the back   EXTREMITIES:  2+ Peripheral Pulses, No cyanosis. +bilateral non-pitting edema of the lower extremities  NEUROLOGY: non-focal  SKIN: Warm, dry, + jaundiced PHYSICAL EXAM:  GENERAL: NAD, speaks in full sentences, no signs of respiratory distress  HEAD:  Atraumatic, Normocephalic  EYES: EOMI, PERRLA, conjunctiva and scleral icterus+  NECK: Supple, No JVD, + Blood transfusion port on R lateral aspect  CHEST/LUNG: Clear to auscultation bilaterally; No wheeze; No crackles; No accessory muscles used  HEART: Regular rate and rhythm; No murmurs;   ABDOMEN: Soft, Nontender, Nondistended; Bowel sounds present; No guarding  BACK: + diffuse musculoskeletal tenderness of the back   EXTREMITIES:  2+ Peripheral Pulses, No cyanosis. +bilateral non-pitting edema of the lower extremities  NEUROLOGY: non-focal  SKIN: Warm, dry, + jaundiced

## 2023-12-15 NOTE — PATIENT PROFILE ADULT - FALL HARM RISK - HARM RISK INTERVENTIONS
Communicate Risk of Fall with Harm to all staff/Reinforce activity limits and safety measures with patient and family/Tailored Fall Risk Interventions/Visual Cue: Yellow wristband and red socks/Bed in lowest position, wheels locked, appropriate side rails in place/Call bell, personal items and telephone in reach/Instruct patient to call for assistance before getting out of bed or chair/Non-slip footwear when patient is out of bed/[place] to call system/Physically safe environment - no spills, clutter or unnecessary equipment/Purposeful Proactive Rounding/Room/bathroom lighting operational, light cord in reach Communicate Risk of Fall with Harm to all staff/Reinforce activity limits and safety measures with patient and family/Tailored Fall Risk Interventions/Visual Cue: Yellow wristband and red socks/Bed in lowest position, wheels locked, appropriate side rails in place/Call bell, personal items and telephone in reach/Instruct patient to call for assistance before getting out of bed or chair/Non-slip footwear when patient is out of bed/[place] to call system/Physically safe environment - no spills, clutter or unnecessary equipment/Purposeful Proactive Rounding/Room/bathroom lighting operational, light cord in reach

## 2023-12-15 NOTE — ED PROVIDER NOTE - PHYSICAL EXAMINATION
General: mild distress, appears stated age  HEENT: normocephalic, atraumatic   Respiratory: normal work of breathing  MSK: no swelling or tenderness of lower extremities, moving all extremities spontaneously   Skin: warm, dry, jaundice   Neuro: A&Ox3, cranial nerves II-XII intact, 5/5 strength in all extremities, no sensory deficits, normal gait   Psych: appropriate affect

## 2023-12-15 NOTE — H&P ADULT - PROBLEM SELECTOR PLAN 4
Patient with h/o gout, on allopurinol 100 mg daily at home  - c/w home med Patient with h/o gout, on allopurinol 100 mg daily at home  Currently asymptomatic  - c/w home med Patient with symptoms of cough and chills, CXR wnl  - f/u RVP

## 2023-12-15 NOTE — H&P ADULT - NSHPREVIEWOFSYSTEMS_GEN_ALL_CORE
- CONSTITUTIONAL: Denies fever and chills  - HEENT: Denies changes in vision and hearing.  - RESPIRATORY: Denies SOB and cough.  - CV: Denies chest pain and palpitations  - GI: Denies abdominal pain, nausea, vomiting and diarrhea.  - : Denies dysuria and urinary frequency.  - SKIN: Denies rash and pruritus.  - NEUROLOGICAL: Denies headache and syncope.  - PSYCHIATRIC: Denies recent changes in mood. Denies anxiety and depression. - CONSTITUTIONAL: Denies fever. +chills  - HEENT: Denies changes in vision and hearing.  - RESPIRATORY: Denies SOB. +cough  - CV: Denies chest pain and palpitations  - GI: Denies abdominal pain, nausea, vomiting and diarrhea.  - : Denies dysuria and urinary frequency.  - SKIN: Denies rash and pruritus.  - NEUROLOGICAL: Denies headache and syncope.  - PSYCHIATRIC: Denies recent changes in mood. Denies anxiety and depression. - CONSTITUTIONAL: Denies fever. +chills  - HEENT: Denies changes in vision and hearing.  - RESPIRATORY: Denies SOB. +cough  - CV: Denies chest pain and palpitations  - GI: Denies abdominal pain, nausea, vomiting and diarrhea.  - : Denies dysuria, hematuria, and urinary frequency.  - SKIN: Denies rash and pruritus.  - NEUROLOGICAL: Denies headache and syncope.  - PSYCHIATRIC: Denies recent changes in mood. Denies anxiety and depression. - CONSTITUTIONAL: Denies fever. +chills  - HEENT: Denies changes in vision and hearing.  - RESPIRATORY: Denies SOB. +cough  - CV: Denies chest pain and palpitations  - GI: Denies abdominal pain, nausea, vomiting and diarrhea.  - : Denies dysuria, hematuria, and urinary frequency.  - EXT: +bilateral leg edema (chronic)  - SKIN: Denies rash and pruritus.  - NEUROLOGICAL: Denies headache and syncope.  - PSYCHIATRIC: Denies recent changes in mood. Denies anxiety and depression. - CONSTITUTIONAL: Denies fever. +chills  - HEENT: Denies changes in vision and hearing.  - RESPIRATORY: Denies SOB. +cough  - CV: Denies chest pain and palpitations  - GI: Denies abdominal pain, nausea, vomiting and diarrhea.  - : Denies dysuria, hematuria, and urinary frequency.  - EXT: + bilateral leg edema (chronic)  - MSK: + diffuse pain  - SKIN: Denies rash and pruritus.  - NEUROLOGICAL: Denies headache and syncope.  - PSYCHIATRIC: Denies recent changes in mood. Denies anxiety and depression.

## 2023-12-15 NOTE — H&P ADULT - PROBLEM SELECTOR PLAN 5
SCD Patient with h/o gout, on allopurinol 100 mg daily at home  Currently asymptomatic  - c/w home med

## 2023-12-15 NOTE — CHART NOTE - NSCHARTNOTEFT_GEN_A_CORE
As per RN Mandi Rose ([facility]) pt's chemo port has been accessed & is currently in use but there is no order.    PLAN:  1. Wrote provider to RN order that's OK to use chemo port

## 2023-12-15 NOTE — ED ADULT NURSE NOTE - NSFALLUNIVINTERV_ED_ALL_ED
Bed/Stretcher in lowest position, wheels locked, appropriate side rails in place/Call bell, personal items and telephone in reach/Instruct patient to call for assistance before getting out of bed/chair/stretcher/Non-slip footwear applied when patient is off stretcher/[place] to call system/Physically safe environment - no spills, clutter or unnecessary equipment/Purposeful proactive rounding/Room/bathroom lighting operational, light cord in reach Bed/Stretcher in lowest position, wheels locked, appropriate side rails in place/Call bell, personal items and telephone in reach/Instruct patient to call for assistance before getting out of bed/chair/stretcher/Non-slip footwear applied when patient is off stretcher/[place] to call system/Physically safe environment - no spills, clutter or unnecessary equipment/Purposeful proactive rounding/Room/bathroom lighting operational, light cord in reach

## 2023-12-15 NOTE — H&P ADULT - PROBLEM SELECTOR PLAN 2
On admission, patient with , ALT 89,   likely 2/2 to acute sickle cell crisis  - f/u RUQ US On admission, patient with , ALT 89,   likely 2/2 to acute sickle cell crisis vs chronic NAFLD    - f/u RUQ US  -f/u lipid panel in AM Mild MATA, likely prerenal due to sickle cell crisis  BUN-24  Cr-1.16    - f/u repeat BMP at 21:00  -Trend till resolves  - f/u UA ( r/o microscopic hematuria)

## 2023-12-16 LAB
ALBUMIN SERPL ELPH-MCNC: 3.3 G/DL — LOW (ref 3.5–5)
ALBUMIN SERPL ELPH-MCNC: 3.3 G/DL — LOW (ref 3.5–5)
ALP SERPL-CCNC: 149 U/L — HIGH (ref 40–120)
ALP SERPL-CCNC: 149 U/L — HIGH (ref 40–120)
ALT FLD-CCNC: 89 U/L DA — HIGH (ref 10–60)
ALT FLD-CCNC: 89 U/L DA — HIGH (ref 10–60)
ANION GAP SERPL CALC-SCNC: 3 MMOL/L — LOW (ref 5–17)
ANION GAP SERPL CALC-SCNC: 3 MMOL/L — LOW (ref 5–17)
ANISOCYTOSIS BLD QL: SIGNIFICANT CHANGE UP
ANISOCYTOSIS BLD QL: SIGNIFICANT CHANGE UP
AST SERPL-CCNC: 169 U/L — HIGH (ref 10–40)
AST SERPL-CCNC: 169 U/L — HIGH (ref 10–40)
BASOPHILS # BLD AUTO: 0.11 K/UL — SIGNIFICANT CHANGE UP (ref 0–0.2)
BASOPHILS # BLD AUTO: 0.11 K/UL — SIGNIFICANT CHANGE UP (ref 0–0.2)
BASOPHILS NFR BLD AUTO: 1.1 % — SIGNIFICANT CHANGE UP (ref 0–2)
BASOPHILS NFR BLD AUTO: 1.1 % — SIGNIFICANT CHANGE UP (ref 0–2)
BILIRUB SERPL-MCNC: 5 MG/DL — HIGH (ref 0.2–1.2)
BILIRUB SERPL-MCNC: 5 MG/DL — HIGH (ref 0.2–1.2)
BUN SERPL-MCNC: 20 MG/DL — HIGH (ref 7–18)
BUN SERPL-MCNC: 20 MG/DL — HIGH (ref 7–18)
CALCIUM SERPL-MCNC: 8.7 MG/DL — SIGNIFICANT CHANGE UP (ref 8.4–10.5)
CALCIUM SERPL-MCNC: 8.7 MG/DL — SIGNIFICANT CHANGE UP (ref 8.4–10.5)
CHLORIDE SERPL-SCNC: 112 MMOL/L — HIGH (ref 96–108)
CHLORIDE SERPL-SCNC: 112 MMOL/L — HIGH (ref 96–108)
CHOLEST SERPL-MCNC: 115 MG/DL — SIGNIFICANT CHANGE UP
CHOLEST SERPL-MCNC: 115 MG/DL — SIGNIFICANT CHANGE UP
CO2 SERPL-SCNC: 23 MMOL/L — SIGNIFICANT CHANGE UP (ref 22–31)
CO2 SERPL-SCNC: 23 MMOL/L — SIGNIFICANT CHANGE UP (ref 22–31)
CREAT SERPL-MCNC: 0.97 MG/DL — SIGNIFICANT CHANGE UP (ref 0.5–1.3)
CREAT SERPL-MCNC: 0.97 MG/DL — SIGNIFICANT CHANGE UP (ref 0.5–1.3)
EGFR: 99 ML/MIN/1.73M2 — SIGNIFICANT CHANGE UP
EGFR: 99 ML/MIN/1.73M2 — SIGNIFICANT CHANGE UP
ELLIPTOCYTES BLD QL SMEAR: SLIGHT — SIGNIFICANT CHANGE UP
ELLIPTOCYTES BLD QL SMEAR: SLIGHT — SIGNIFICANT CHANGE UP
EOSINOPHIL # BLD AUTO: 0.33 K/UL — SIGNIFICANT CHANGE UP (ref 0–0.5)
EOSINOPHIL # BLD AUTO: 0.33 K/UL — SIGNIFICANT CHANGE UP (ref 0–0.5)
EOSINOPHIL NFR BLD AUTO: 3.4 % — SIGNIFICANT CHANGE UP (ref 0–6)
EOSINOPHIL NFR BLD AUTO: 3.4 % — SIGNIFICANT CHANGE UP (ref 0–6)
GLUCOSE SERPL-MCNC: 96 MG/DL — SIGNIFICANT CHANGE UP (ref 70–99)
GLUCOSE SERPL-MCNC: 96 MG/DL — SIGNIFICANT CHANGE UP (ref 70–99)
HCT VFR BLD CALC: 21.2 % — LOW (ref 39–50)
HCT VFR BLD CALC: 21.2 % — LOW (ref 39–50)
HDLC SERPL-MCNC: 23 MG/DL — LOW
HDLC SERPL-MCNC: 23 MG/DL — LOW
HGB BLD-MCNC: 7.3 G/DL — LOW (ref 13–17)
HGB BLD-MCNC: 7.3 G/DL — LOW (ref 13–17)
HYPOCHROMIA BLD QL: SIGNIFICANT CHANGE UP
HYPOCHROMIA BLD QL: SIGNIFICANT CHANGE UP
IMM GRANULOCYTES NFR BLD AUTO: 0.8 % — SIGNIFICANT CHANGE UP (ref 0–0.9)
IMM GRANULOCYTES NFR BLD AUTO: 0.8 % — SIGNIFICANT CHANGE UP (ref 0–0.9)
LIPID PNL WITH DIRECT LDL SERPL: 52 MG/DL — SIGNIFICANT CHANGE UP
LIPID PNL WITH DIRECT LDL SERPL: 52 MG/DL — SIGNIFICANT CHANGE UP
LYMPHOCYTES # BLD AUTO: 2.24 K/UL — SIGNIFICANT CHANGE UP (ref 1–3.3)
LYMPHOCYTES # BLD AUTO: 2.24 K/UL — SIGNIFICANT CHANGE UP (ref 1–3.3)
LYMPHOCYTES # BLD AUTO: 22.8 % — SIGNIFICANT CHANGE UP (ref 13–44)
LYMPHOCYTES # BLD AUTO: 22.8 % — SIGNIFICANT CHANGE UP (ref 13–44)
MAGNESIUM SERPL-MCNC: 1.8 MG/DL — SIGNIFICANT CHANGE UP (ref 1.6–2.6)
MAGNESIUM SERPL-MCNC: 1.8 MG/DL — SIGNIFICANT CHANGE UP (ref 1.6–2.6)
MANUAL SMEAR VERIFICATION: SIGNIFICANT CHANGE UP
MANUAL SMEAR VERIFICATION: SIGNIFICANT CHANGE UP
MCHC RBC-ENTMCNC: 30 PG — SIGNIFICANT CHANGE UP (ref 27–34)
MCHC RBC-ENTMCNC: 30 PG — SIGNIFICANT CHANGE UP (ref 27–34)
MCHC RBC-ENTMCNC: 34.4 GM/DL — SIGNIFICANT CHANGE UP (ref 32–36)
MCHC RBC-ENTMCNC: 34.4 GM/DL — SIGNIFICANT CHANGE UP (ref 32–36)
MCV RBC AUTO: 87.2 FL — SIGNIFICANT CHANGE UP (ref 80–100)
MCV RBC AUTO: 87.2 FL — SIGNIFICANT CHANGE UP (ref 80–100)
MONOCYTES # BLD AUTO: 0.88 K/UL — SIGNIFICANT CHANGE UP (ref 0–0.9)
MONOCYTES # BLD AUTO: 0.88 K/UL — SIGNIFICANT CHANGE UP (ref 0–0.9)
MONOCYTES NFR BLD AUTO: 9 % — SIGNIFICANT CHANGE UP (ref 2–14)
MONOCYTES NFR BLD AUTO: 9 % — SIGNIFICANT CHANGE UP (ref 2–14)
NEUTROPHILS # BLD AUTO: 6.18 K/UL — SIGNIFICANT CHANGE UP (ref 1.8–7.4)
NEUTROPHILS # BLD AUTO: 6.18 K/UL — SIGNIFICANT CHANGE UP (ref 1.8–7.4)
NEUTROPHILS NFR BLD AUTO: 62.9 % — SIGNIFICANT CHANGE UP (ref 43–77)
NEUTROPHILS NFR BLD AUTO: 62.9 % — SIGNIFICANT CHANGE UP (ref 43–77)
NON HDL CHOLESTEROL: 92 MG/DL — SIGNIFICANT CHANGE UP
NON HDL CHOLESTEROL: 92 MG/DL — SIGNIFICANT CHANGE UP
NRBC # BLD: 2 /100 WBCS — HIGH (ref 0–0)
NRBC # BLD: 2 /100 WBCS — HIGH (ref 0–0)
PHOSPHATE SERPL-MCNC: 3.3 MG/DL — SIGNIFICANT CHANGE UP (ref 2.5–4.5)
PHOSPHATE SERPL-MCNC: 3.3 MG/DL — SIGNIFICANT CHANGE UP (ref 2.5–4.5)
PLAT MORPH BLD: NORMAL — SIGNIFICANT CHANGE UP
PLAT MORPH BLD: NORMAL — SIGNIFICANT CHANGE UP
PLATELET # BLD AUTO: 310 K/UL — SIGNIFICANT CHANGE UP (ref 150–400)
PLATELET # BLD AUTO: 310 K/UL — SIGNIFICANT CHANGE UP (ref 150–400)
PLATELET COUNT - ESTIMATE: NORMAL — SIGNIFICANT CHANGE UP
PLATELET COUNT - ESTIMATE: NORMAL — SIGNIFICANT CHANGE UP
POIKILOCYTOSIS BLD QL AUTO: SIGNIFICANT CHANGE UP
POIKILOCYTOSIS BLD QL AUTO: SIGNIFICANT CHANGE UP
POLYCHROMASIA BLD QL SMEAR: SLIGHT — SIGNIFICANT CHANGE UP
POLYCHROMASIA BLD QL SMEAR: SLIGHT — SIGNIFICANT CHANGE UP
POTASSIUM SERPL-MCNC: 4.5 MMOL/L — SIGNIFICANT CHANGE UP (ref 3.5–5.3)
POTASSIUM SERPL-MCNC: 4.5 MMOL/L — SIGNIFICANT CHANGE UP (ref 3.5–5.3)
POTASSIUM SERPL-SCNC: 4.5 MMOL/L — SIGNIFICANT CHANGE UP (ref 3.5–5.3)
POTASSIUM SERPL-SCNC: 4.5 MMOL/L — SIGNIFICANT CHANGE UP (ref 3.5–5.3)
PROCALCITONIN SERPL-MCNC: 0.04 NG/ML — SIGNIFICANT CHANGE UP (ref 0.02–0.1)
PROCALCITONIN SERPL-MCNC: 0.04 NG/ML — SIGNIFICANT CHANGE UP (ref 0.02–0.1)
PROT SERPL-MCNC: 7.3 G/DL — SIGNIFICANT CHANGE UP (ref 6–8.3)
PROT SERPL-MCNC: 7.3 G/DL — SIGNIFICANT CHANGE UP (ref 6–8.3)
RBC # BLD: 2.43 M/UL — LOW (ref 4.2–5.8)
RBC # BLD: 2.43 M/UL — LOW (ref 4.2–5.8)
RBC # FLD: 21.5 % — HIGH (ref 10.3–14.5)
RBC # FLD: 21.5 % — HIGH (ref 10.3–14.5)
RBC BLD AUTO: ABNORMAL
RBC BLD AUTO: ABNORMAL
SCHISTOCYTES BLD QL AUTO: SIGNIFICANT CHANGE UP
SCHISTOCYTES BLD QL AUTO: SIGNIFICANT CHANGE UP
SICKLE CELLS BLD QL SMEAR: SIGNIFICANT CHANGE UP
SICKLE CELLS BLD QL SMEAR: SIGNIFICANT CHANGE UP
SODIUM SERPL-SCNC: 138 MMOL/L — SIGNIFICANT CHANGE UP (ref 135–145)
SODIUM SERPL-SCNC: 138 MMOL/L — SIGNIFICANT CHANGE UP (ref 135–145)
TARGETS BLD QL SMEAR: SIGNIFICANT CHANGE UP
TARGETS BLD QL SMEAR: SIGNIFICANT CHANGE UP
TRIGL SERPL-MCNC: 201 MG/DL — HIGH
TRIGL SERPL-MCNC: 201 MG/DL — HIGH
WBC # BLD: 9.99 K/UL — SIGNIFICANT CHANGE UP (ref 3.8–10.5)
WBC # BLD: 9.99 K/UL — SIGNIFICANT CHANGE UP (ref 3.8–10.5)
WBC # FLD AUTO: 9.99 K/UL — SIGNIFICANT CHANGE UP (ref 3.8–10.5)
WBC # FLD AUTO: 9.99 K/UL — SIGNIFICANT CHANGE UP (ref 3.8–10.5)

## 2023-12-16 PROCEDURE — 76705 ECHO EXAM OF ABDOMEN: CPT | Mod: 26

## 2023-12-16 PROCEDURE — 99232 SBSQ HOSP IP/OBS MODERATE 35: CPT

## 2023-12-16 RX ADMIN — HYDROMORPHONE HYDROCHLORIDE 2 MILLIGRAM(S): 2 INJECTION INTRAMUSCULAR; INTRAVENOUS; SUBCUTANEOUS at 01:08

## 2023-12-16 RX ADMIN — HYDROMORPHONE HYDROCHLORIDE 2 MILLIGRAM(S): 2 INJECTION INTRAMUSCULAR; INTRAVENOUS; SUBCUTANEOUS at 01:36

## 2023-12-16 RX ADMIN — HYDROMORPHONE HYDROCHLORIDE 2 MILLIGRAM(S): 2 INJECTION INTRAMUSCULAR; INTRAVENOUS; SUBCUTANEOUS at 20:51

## 2023-12-16 RX ADMIN — HYDROMORPHONE HYDROCHLORIDE 2 MILLIGRAM(S): 2 INJECTION INTRAMUSCULAR; INTRAVENOUS; SUBCUTANEOUS at 10:30

## 2023-12-16 RX ADMIN — HYDROMORPHONE HYDROCHLORIDE 2 MILLIGRAM(S): 2 INJECTION INTRAMUSCULAR; INTRAVENOUS; SUBCUTANEOUS at 05:51

## 2023-12-16 RX ADMIN — Medication 100 MILLIGRAM(S): at 11:13

## 2023-12-16 RX ADMIN — Medication 1 MILLIGRAM(S): at 12:24

## 2023-12-16 RX ADMIN — HYDROMORPHONE HYDROCHLORIDE 2 MILLIGRAM(S): 2 INJECTION INTRAMUSCULAR; INTRAVENOUS; SUBCUTANEOUS at 23:01

## 2023-12-16 RX ADMIN — HYDROMORPHONE HYDROCHLORIDE 2 MILLIGRAM(S): 2 INJECTION INTRAMUSCULAR; INTRAVENOUS; SUBCUTANEOUS at 12:20

## 2023-12-16 RX ADMIN — HYDROMORPHONE HYDROCHLORIDE 2 MILLIGRAM(S): 2 INJECTION INTRAMUSCULAR; INTRAVENOUS; SUBCUTANEOUS at 15:25

## 2023-12-16 RX ADMIN — HYDROMORPHONE HYDROCHLORIDE 2 MILLIGRAM(S): 2 INJECTION INTRAMUSCULAR; INTRAVENOUS; SUBCUTANEOUS at 07:52

## 2023-12-16 RX ADMIN — HYDROMORPHONE HYDROCHLORIDE 2 MILLIGRAM(S): 2 INJECTION INTRAMUSCULAR; INTRAVENOUS; SUBCUTANEOUS at 22:35

## 2023-12-16 RX ADMIN — HYDROMORPHONE HYDROCHLORIDE 2 MILLIGRAM(S): 2 INJECTION INTRAMUSCULAR; INTRAVENOUS; SUBCUTANEOUS at 19:24

## 2023-12-16 RX ADMIN — HYDROMORPHONE HYDROCHLORIDE 2 MILLIGRAM(S): 2 INJECTION INTRAMUSCULAR; INTRAVENOUS; SUBCUTANEOUS at 20:14

## 2023-12-16 RX ADMIN — HYDROMORPHONE HYDROCHLORIDE 2 MILLIGRAM(S): 2 INJECTION INTRAMUSCULAR; INTRAVENOUS; SUBCUTANEOUS at 17:49

## 2023-12-16 RX ADMIN — HYDROMORPHONE HYDROCHLORIDE 2 MILLIGRAM(S): 2 INJECTION INTRAMUSCULAR; INTRAVENOUS; SUBCUTANEOUS at 10:14

## 2023-12-16 RX ADMIN — HYDROMORPHONE HYDROCHLORIDE 2 MILLIGRAM(S): 2 INJECTION INTRAMUSCULAR; INTRAVENOUS; SUBCUTANEOUS at 12:35

## 2023-12-16 RX ADMIN — HYDROMORPHONE HYDROCHLORIDE 2 MILLIGRAM(S): 2 INJECTION INTRAMUSCULAR; INTRAVENOUS; SUBCUTANEOUS at 15:07

## 2023-12-16 RX ADMIN — HYDROMORPHONE HYDROCHLORIDE 2 MILLIGRAM(S): 2 INJECTION INTRAMUSCULAR; INTRAVENOUS; SUBCUTANEOUS at 08:07

## 2023-12-16 RX ADMIN — HYDROMORPHONE HYDROCHLORIDE 2 MILLIGRAM(S): 2 INJECTION INTRAMUSCULAR; INTRAVENOUS; SUBCUTANEOUS at 04:58

## 2023-12-16 NOTE — CONSULT NOTE ADULT - ASSESSMENT
Sickle cell anemia with crisis  - pain control per primary team  - IVF, Folate  - no concern for ACS  - monitor VS    Anemia  - in the setting of sickle cell and possible BM suppression due to URI  - S/P 2 units of PRBCs  - no further transfusion  - iron chelation for iron overload as outpatient    Will follow

## 2023-12-16 NOTE — CONSULT NOTE ADULT - SUBJECTIVE AND OBJECTIVE BOX
Reason for consult: sickle cell disease    HPI    44 yo M with h/o Sickle cell disease is sent from [facility] for severe anemia.  Patient has frequent admissions for sickle cell crisis in the past.  noted with Hb 4.7 as outpatient thus sent for admission.   Denies CP, or SOB, Pain is overall well controlled.   s/p 2 units of PRBCs with improvement to Hb>7.   Saturating well on room air. CXR wnl.       PAST MEDICAL & SURGICAL HISTORY:  Sickle cell anemia      Gout      History of cholecystectomy          FAMILY HISTORY:  Family history of sickle cell trait (Father, Mother)    Patient's father is  (Father)    Patient's mother is  (Mother)        Alochol: Denied  Smoking: Nonsmoker  Drug Use: Denied  Marital Status:         Allergies    piperacillin-tazobactam (Urticaria)  hydroxyurea (Other)  penicillin (Pruritus)  ceftriaxone (Anaphylaxis)    Intolerances        MEDICATIONS  (STANDING):  allopurinol 100 milliGRAM(s) Oral daily  folic acid 1 milliGRAM(s) Oral every 24 hours    MEDICATIONS  (PRN):  acetaminophen     Tablet .. 650 milliGRAM(s) Oral every 6 hours PRN Temp greater or equal to 38C (100.4F), Mild Pain (1 - 3)  aluminum hydroxide/magnesium hydroxide/simethicone Suspension 30 milliLiter(s) Oral every 4 hours PRN Dyspepsia  HYDROmorphone  Injectable 2 milliGRAM(s) IV Push every 2 hours PRN Severe Pain (7 - 10)  melatonin 3 milliGRAM(s) Oral at bedtime PRN Insomnia  ondansetron Injectable 4 milliGRAM(s) IV Push every 8 hours PRN Nausea and/or Vomiting      ROS  No fever, sweats, chills  No epistaxis, HA, sore throat  No CP, SOB, cough, sputum  No n/v/d, abd pain, melena, hematochezia  No edema  No rash  No anxiety  No back pain, joint pain  No bleeding, bruising  No dysuria, hematuria    T(C): 36.8 (23 @ 13:10), Max: 36.8 (23 @ 13:10)  HR: 79 (23 @ 13:10) (74 - 79)  BP: 132/72 (23 @ 13:10) (132/72 - 162/94)  RR: 16 (12-16-23 @ 13:10) (16 - 18)  SpO2: 98% (-- @ 13:10) (95% - 98%)  Wt(kg): --    PE  NAD  Awake, alert  Anicteric, MMM  RRR  CTAB  Abd soft, NT, ND  No c/c/e  No rash grossly  FROM                          7.3    9.99  )-----------( 310      ( 16 Dec 2023 07:24 )             21.2           138  |  112<H>  |  20<H>  ----------------------------<  96  4.5   |  23  |  0.97    Ca    8.7      16 Dec 2023 07:24  Phos  3.3       Mg     1.8         TPro  7.3  /  Alb  3.3<L>  /  TBili  5.0<H>  /  DBili  x   /  AST  169<H>  /  ALT  89<H>  /  AlkPhos  149<H>     Reason for consult: sickle cell disease    HPI    42 yo M with h/o Sickle cell disease is sent from [facility] for severe anemia.  Patient has frequent admissions for sickle cell crisis in the past.  noted with Hb 4.7 as outpatient thus sent for admission.   Denies CP, or SOB, Pain is overall well controlled.   s/p 2 units of PRBCs with improvement to Hb>7.   Saturating well on room air. CXR wnl.       PAST MEDICAL & SURGICAL HISTORY:  Sickle cell anemia      Gout      History of cholecystectomy          FAMILY HISTORY:  Family history of sickle cell trait (Father, Mother)    Patient's father is  (Father)    Patient's mother is  (Mother)        Alochol: Denied  Smoking: Nonsmoker  Drug Use: Denied  Marital Status:         Allergies    piperacillin-tazobactam (Urticaria)  hydroxyurea (Other)  penicillin (Pruritus)  ceftriaxone (Anaphylaxis)    Intolerances        MEDICATIONS  (STANDING):  allopurinol 100 milliGRAM(s) Oral daily  folic acid 1 milliGRAM(s) Oral every 24 hours    MEDICATIONS  (PRN):  acetaminophen     Tablet .. 650 milliGRAM(s) Oral every 6 hours PRN Temp greater or equal to 38C (100.4F), Mild Pain (1 - 3)  aluminum hydroxide/magnesium hydroxide/simethicone Suspension 30 milliLiter(s) Oral every 4 hours PRN Dyspepsia  HYDROmorphone  Injectable 2 milliGRAM(s) IV Push every 2 hours PRN Severe Pain (7 - 10)  melatonin 3 milliGRAM(s) Oral at bedtime PRN Insomnia  ondansetron Injectable 4 milliGRAM(s) IV Push every 8 hours PRN Nausea and/or Vomiting      ROS  No fever, sweats, chills  No epistaxis, HA, sore throat  No CP, SOB, cough, sputum  No n/v/d, abd pain, melena, hematochezia  No edema  No rash  No anxiety  No back pain, joint pain  No bleeding, bruising  No dysuria, hematuria    T(C): 36.8 (23 @ 13:10), Max: 36.8 (23 @ 13:10)  HR: 79 (23 @ 13:10) (74 - 79)  BP: 132/72 (23 @ 13:10) (132/72 - 162/94)  RR: 16 (12-16-23 @ 13:10) (16 - 18)  SpO2: 98% (-- @ 13:10) (95% - 98%)  Wt(kg): --    PE  NAD  Awake, alert  Anicteric, MMM  RRR  CTAB  Abd soft, NT, ND  No c/c/e  No rash grossly  FROM                          7.3    9.99  )-----------( 310      ( 16 Dec 2023 07:24 )             21.2           138  |  112<H>  |  20<H>  ----------------------------<  96  4.5   |  23  |  0.97    Ca    8.7      16 Dec 2023 07:24  Phos  3.3       Mg     1.8         TPro  7.3  /  Alb  3.3<L>  /  TBili  5.0<H>  /  DBili  x   /  AST  169<H>  /  ALT  89<H>  /  AlkPhos  149<H>

## 2023-12-17 ENCOUNTER — TRANSCRIPTION ENCOUNTER (OUTPATIENT)
Age: 43
End: 2023-12-17

## 2023-12-17 LAB
ALBUMIN SERPL ELPH-MCNC: 3.1 G/DL — LOW (ref 3.5–5)
ALBUMIN SERPL ELPH-MCNC: 3.1 G/DL — LOW (ref 3.5–5)
ALP SERPL-CCNC: 143 U/L — HIGH (ref 40–120)
ALP SERPL-CCNC: 143 U/L — HIGH (ref 40–120)
ALT FLD-CCNC: 86 U/L DA — HIGH (ref 10–60)
ALT FLD-CCNC: 86 U/L DA — HIGH (ref 10–60)
ANION GAP SERPL CALC-SCNC: 4 MMOL/L — LOW (ref 5–17)
ANION GAP SERPL CALC-SCNC: 4 MMOL/L — LOW (ref 5–17)
AST SERPL-CCNC: 165 U/L — HIGH (ref 10–40)
AST SERPL-CCNC: 165 U/L — HIGH (ref 10–40)
BASOPHILS # BLD AUTO: 0.1 K/UL — SIGNIFICANT CHANGE UP (ref 0–0.2)
BASOPHILS # BLD AUTO: 0.1 K/UL — SIGNIFICANT CHANGE UP (ref 0–0.2)
BASOPHILS NFR BLD AUTO: 1.1 % — SIGNIFICANT CHANGE UP (ref 0–2)
BASOPHILS NFR BLD AUTO: 1.1 % — SIGNIFICANT CHANGE UP (ref 0–2)
BILIRUB SERPL-MCNC: 5.5 MG/DL — HIGH (ref 0.2–1.2)
BILIRUB SERPL-MCNC: 5.5 MG/DL — HIGH (ref 0.2–1.2)
BUN SERPL-MCNC: 22 MG/DL — HIGH (ref 7–18)
BUN SERPL-MCNC: 22 MG/DL — HIGH (ref 7–18)
CALCIUM SERPL-MCNC: 8.4 MG/DL — SIGNIFICANT CHANGE UP (ref 8.4–10.5)
CALCIUM SERPL-MCNC: 8.4 MG/DL — SIGNIFICANT CHANGE UP (ref 8.4–10.5)
CHLORIDE SERPL-SCNC: 111 MMOL/L — HIGH (ref 96–108)
CHLORIDE SERPL-SCNC: 111 MMOL/L — HIGH (ref 96–108)
CO2 SERPL-SCNC: 23 MMOL/L — SIGNIFICANT CHANGE UP (ref 22–31)
CO2 SERPL-SCNC: 23 MMOL/L — SIGNIFICANT CHANGE UP (ref 22–31)
CREAT SERPL-MCNC: 0.93 MG/DL — SIGNIFICANT CHANGE UP (ref 0.5–1.3)
CREAT SERPL-MCNC: 0.93 MG/DL — SIGNIFICANT CHANGE UP (ref 0.5–1.3)
EGFR: 104 ML/MIN/1.73M2 — SIGNIFICANT CHANGE UP
EGFR: 104 ML/MIN/1.73M2 — SIGNIFICANT CHANGE UP
EOSINOPHIL # BLD AUTO: 0.3 K/UL — SIGNIFICANT CHANGE UP (ref 0–0.5)
EOSINOPHIL # BLD AUTO: 0.3 K/UL — SIGNIFICANT CHANGE UP (ref 0–0.5)
EOSINOPHIL NFR BLD AUTO: 3.4 % — SIGNIFICANT CHANGE UP (ref 0–6)
EOSINOPHIL NFR BLD AUTO: 3.4 % — SIGNIFICANT CHANGE UP (ref 0–6)
GLUCOSE SERPL-MCNC: 91 MG/DL — SIGNIFICANT CHANGE UP (ref 70–99)
GLUCOSE SERPL-MCNC: 91 MG/DL — SIGNIFICANT CHANGE UP (ref 70–99)
HCT VFR BLD CALC: 21.6 % — LOW (ref 39–50)
HCT VFR BLD CALC: 21.6 % — LOW (ref 39–50)
HGB BLD-MCNC: 7.5 G/DL — LOW (ref 13–17)
HGB BLD-MCNC: 7.5 G/DL — LOW (ref 13–17)
IMM GRANULOCYTES NFR BLD AUTO: 0.4 % — SIGNIFICANT CHANGE UP (ref 0–0.9)
IMM GRANULOCYTES NFR BLD AUTO: 0.4 % — SIGNIFICANT CHANGE UP (ref 0–0.9)
LYMPHOCYTES # BLD AUTO: 3.05 K/UL — SIGNIFICANT CHANGE UP (ref 1–3.3)
LYMPHOCYTES # BLD AUTO: 3.05 K/UL — SIGNIFICANT CHANGE UP (ref 1–3.3)
LYMPHOCYTES # BLD AUTO: 34.1 % — SIGNIFICANT CHANGE UP (ref 13–44)
LYMPHOCYTES # BLD AUTO: 34.1 % — SIGNIFICANT CHANGE UP (ref 13–44)
MAGNESIUM SERPL-MCNC: 1.8 MG/DL — SIGNIFICANT CHANGE UP (ref 1.6–2.6)
MAGNESIUM SERPL-MCNC: 1.8 MG/DL — SIGNIFICANT CHANGE UP (ref 1.6–2.6)
MCHC RBC-ENTMCNC: 30.7 PG — SIGNIFICANT CHANGE UP (ref 27–34)
MCHC RBC-ENTMCNC: 30.7 PG — SIGNIFICANT CHANGE UP (ref 27–34)
MCHC RBC-ENTMCNC: 34.7 GM/DL — SIGNIFICANT CHANGE UP (ref 32–36)
MCHC RBC-ENTMCNC: 34.7 GM/DL — SIGNIFICANT CHANGE UP (ref 32–36)
MCV RBC AUTO: 88.5 FL — SIGNIFICANT CHANGE UP (ref 80–100)
MCV RBC AUTO: 88.5 FL — SIGNIFICANT CHANGE UP (ref 80–100)
MONOCYTES # BLD AUTO: 1.08 K/UL — HIGH (ref 0–0.9)
MONOCYTES # BLD AUTO: 1.08 K/UL — HIGH (ref 0–0.9)
MONOCYTES NFR BLD AUTO: 12.1 % — SIGNIFICANT CHANGE UP (ref 2–14)
MONOCYTES NFR BLD AUTO: 12.1 % — SIGNIFICANT CHANGE UP (ref 2–14)
NEUTROPHILS # BLD AUTO: 4.37 K/UL — SIGNIFICANT CHANGE UP (ref 1.8–7.4)
NEUTROPHILS # BLD AUTO: 4.37 K/UL — SIGNIFICANT CHANGE UP (ref 1.8–7.4)
NEUTROPHILS NFR BLD AUTO: 48.9 % — SIGNIFICANT CHANGE UP (ref 43–77)
NEUTROPHILS NFR BLD AUTO: 48.9 % — SIGNIFICANT CHANGE UP (ref 43–77)
NRBC # BLD: 2 /100 WBCS — HIGH (ref 0–0)
NRBC # BLD: 2 /100 WBCS — HIGH (ref 0–0)
PHOSPHATE SERPL-MCNC: 4.1 MG/DL — SIGNIFICANT CHANGE UP (ref 2.5–4.5)
PHOSPHATE SERPL-MCNC: 4.1 MG/DL — SIGNIFICANT CHANGE UP (ref 2.5–4.5)
PLATELET # BLD AUTO: 307 K/UL — SIGNIFICANT CHANGE UP (ref 150–400)
PLATELET # BLD AUTO: 307 K/UL — SIGNIFICANT CHANGE UP (ref 150–400)
POTASSIUM SERPL-MCNC: 4.5 MMOL/L — SIGNIFICANT CHANGE UP (ref 3.5–5.3)
POTASSIUM SERPL-MCNC: 4.5 MMOL/L — SIGNIFICANT CHANGE UP (ref 3.5–5.3)
POTASSIUM SERPL-SCNC: 4.5 MMOL/L — SIGNIFICANT CHANGE UP (ref 3.5–5.3)
POTASSIUM SERPL-SCNC: 4.5 MMOL/L — SIGNIFICANT CHANGE UP (ref 3.5–5.3)
PROT SERPL-MCNC: 7.6 G/DL — SIGNIFICANT CHANGE UP (ref 6–8.3)
PROT SERPL-MCNC: 7.6 G/DL — SIGNIFICANT CHANGE UP (ref 6–8.3)
RBC # BLD: 2.44 M/UL — LOW (ref 4.2–5.8)
RBC # BLD: 2.44 M/UL — LOW (ref 4.2–5.8)
RBC # FLD: 21.1 % — HIGH (ref 10.3–14.5)
RBC # FLD: 21.1 % — HIGH (ref 10.3–14.5)
SODIUM SERPL-SCNC: 138 MMOL/L — SIGNIFICANT CHANGE UP (ref 135–145)
SODIUM SERPL-SCNC: 138 MMOL/L — SIGNIFICANT CHANGE UP (ref 135–145)
WBC # BLD: 8.94 K/UL — SIGNIFICANT CHANGE UP (ref 3.8–10.5)
WBC # BLD: 8.94 K/UL — SIGNIFICANT CHANGE UP (ref 3.8–10.5)
WBC # FLD AUTO: 8.94 K/UL — SIGNIFICANT CHANGE UP (ref 3.8–10.5)
WBC # FLD AUTO: 8.94 K/UL — SIGNIFICANT CHANGE UP (ref 3.8–10.5)

## 2023-12-17 PROCEDURE — 99232 SBSQ HOSP IP/OBS MODERATE 35: CPT

## 2023-12-17 RX ADMIN — HYDROMORPHONE HYDROCHLORIDE 2 MILLIGRAM(S): 2 INJECTION INTRAMUSCULAR; INTRAVENOUS; SUBCUTANEOUS at 06:27

## 2023-12-17 RX ADMIN — HYDROMORPHONE HYDROCHLORIDE 2 MILLIGRAM(S): 2 INJECTION INTRAMUSCULAR; INTRAVENOUS; SUBCUTANEOUS at 06:57

## 2023-12-17 RX ADMIN — HYDROMORPHONE HYDROCHLORIDE 2 MILLIGRAM(S): 2 INJECTION INTRAMUSCULAR; INTRAVENOUS; SUBCUTANEOUS at 12:06

## 2023-12-17 RX ADMIN — Medication 1 MILLIGRAM(S): at 12:05

## 2023-12-17 RX ADMIN — HYDROMORPHONE HYDROCHLORIDE 2 MILLIGRAM(S): 2 INJECTION INTRAMUSCULAR; INTRAVENOUS; SUBCUTANEOUS at 09:40

## 2023-12-17 RX ADMIN — HYDROMORPHONE HYDROCHLORIDE 2 MILLIGRAM(S): 2 INJECTION INTRAMUSCULAR; INTRAVENOUS; SUBCUTANEOUS at 04:06

## 2023-12-17 RX ADMIN — HYDROMORPHONE HYDROCHLORIDE 2 MILLIGRAM(S): 2 INJECTION INTRAMUSCULAR; INTRAVENOUS; SUBCUTANEOUS at 01:25

## 2023-12-17 RX ADMIN — HYDROMORPHONE HYDROCHLORIDE 2 MILLIGRAM(S): 2 INJECTION INTRAMUSCULAR; INTRAVENOUS; SUBCUTANEOUS at 22:39

## 2023-12-17 RX ADMIN — HYDROMORPHONE HYDROCHLORIDE 2 MILLIGRAM(S): 2 INJECTION INTRAMUSCULAR; INTRAVENOUS; SUBCUTANEOUS at 14:48

## 2023-12-17 RX ADMIN — HYDROMORPHONE HYDROCHLORIDE 2 MILLIGRAM(S): 2 INJECTION INTRAMUSCULAR; INTRAVENOUS; SUBCUTANEOUS at 17:20

## 2023-12-17 RX ADMIN — HYDROMORPHONE HYDROCHLORIDE 2 MILLIGRAM(S): 2 INJECTION INTRAMUSCULAR; INTRAVENOUS; SUBCUTANEOUS at 19:27

## 2023-12-17 RX ADMIN — HYDROMORPHONE HYDROCHLORIDE 2 MILLIGRAM(S): 2 INJECTION INTRAMUSCULAR; INTRAVENOUS; SUBCUTANEOUS at 01:00

## 2023-12-17 RX ADMIN — HYDROMORPHONE HYDROCHLORIDE 2 MILLIGRAM(S): 2 INJECTION INTRAMUSCULAR; INTRAVENOUS; SUBCUTANEOUS at 12:24

## 2023-12-17 RX ADMIN — HYDROMORPHONE HYDROCHLORIDE 2 MILLIGRAM(S): 2 INJECTION INTRAMUSCULAR; INTRAVENOUS; SUBCUTANEOUS at 17:35

## 2023-12-17 RX ADMIN — HYDROMORPHONE HYDROCHLORIDE 2 MILLIGRAM(S): 2 INJECTION INTRAMUSCULAR; INTRAVENOUS; SUBCUTANEOUS at 20:01

## 2023-12-17 RX ADMIN — HYDROMORPHONE HYDROCHLORIDE 2 MILLIGRAM(S): 2 INJECTION INTRAMUSCULAR; INTRAVENOUS; SUBCUTANEOUS at 14:33

## 2023-12-17 RX ADMIN — HYDROMORPHONE HYDROCHLORIDE 2 MILLIGRAM(S): 2 INJECTION INTRAMUSCULAR; INTRAVENOUS; SUBCUTANEOUS at 03:37

## 2023-12-17 RX ADMIN — HYDROMORPHONE HYDROCHLORIDE 2 MILLIGRAM(S): 2 INJECTION INTRAMUSCULAR; INTRAVENOUS; SUBCUTANEOUS at 09:24

## 2023-12-17 RX ADMIN — Medication 100 MILLIGRAM(S): at 12:06

## 2023-12-17 RX ADMIN — HYDROMORPHONE HYDROCHLORIDE 2 MILLIGRAM(S): 2 INJECTION INTRAMUSCULAR; INTRAVENOUS; SUBCUTANEOUS at 22:03

## 2023-12-17 NOTE — DISCHARGE NOTE PROVIDER - NSDCCPCAREPLAN_GEN_ALL_CORE_FT
PRINCIPAL DISCHARGE DIAGNOSIS  Diagnosis: Sickle cell crisis  Assessment and Plan of Treatment:       SECONDARY DISCHARGE DIAGNOSES  Diagnosis: Transaminitis  Assessment and Plan of Treatment: You were found to have elevated liver enzymes. liver enzymes become elevated due to liver injury. You had an ultrasound of your liver which showed hepatomegaly which is unchnaged from previous imaging. An enlarged liver is a symptom of underlying disease. The likely cause of your liver inflammation is your sickle cell disease which causes iron to accumulate in your liver. Please follow- up with your heme/oncologist outpt for further work-up.    Diagnosis: Gout  Assessment and Plan of Treatment: You have a history of gout. Please continue with medication as prescribed.  Follow-up with your primary care physician .  Call your physician if you develop pain not relieved with pain regimen, fever and or swelling/redness in your extremity (ies).       PRINCIPAL DISCHARGE DIAGNOSIS  Diagnosis: Sickle cell crisis  Assessment and Plan of Treatment: you were sent to the hospital for low blood count and were found to have anemia, your blood count was low likely due to your sickle cell disease. you were given blood transfusions and you were also evaluated by Hematologist. your blood count improved and remained stable around your baseline.  you also recieved pain medications and fluids. A sickle cell crisis is a painful episode that occurs in people who have sickle cell anemia. It happens when sickle-shaped red blood cells (RBCs) block blood vessels. Blood and oxygen cannot get to tissues, causing pain. A sickle cell crisis can also damage your tissues and cause organ failure, such liver or kidney failure. A sickle cell crisis can become life-threatening.  Continue medications as prescribed         SECONDARY DISCHARGE DIAGNOSES  Diagnosis: Gout  Assessment and Plan of Treatment: You have a history of gout. Please continue with medication as prescribed.  Follow-up with your primary care physician .  Call your physician if you develop pain not relieved with pain regimen, fever and or swelling/redness in your extremity (ies).      Diagnosis: Transaminitis  Assessment and Plan of Treatment: You were found to have elevated liver enzymes. liver enzymes become elevated due to liver injury. You had an ultrasound of your liver which showed hepatomegaly which is unchnaged from previous imaging. An enlarged liver is a symptom of underlying disease. The likely cause of your liver inflammation is your sickle cell disease which causes iron to accumulate in your liver. Please follow- up with your heme/oncologist outpt for further work-up.     PRINCIPAL DISCHARGE DIAGNOSIS  Diagnosis: Sickle cell crisis  Assessment and Plan of Treatment: you were sent to the hospital for low blood count and were found to have anemia, your blood count was low likely due to your sickle cell disease. you were given blood transfusions and you were also evaluated by Hematologist. your blood count improved and remained stable around your baseline.  you also recieved pain medications and fluids. A sickle cell crisis is a painful episode that occurs in people who have sickle cell anemia. It happens when sickle-shaped red blood cells (RBCs) block blood vessels. Blood and oxygen cannot get to tissues, causing pain. A sickle cell crisis can also damage your tissues and cause organ failure, such liver or kidney failure. A sickle cell crisis can become life-threatening.  Continue medications as prescribed   Follow up with Dr. Jordan as scheduled        SECONDARY DISCHARGE DIAGNOSES  Diagnosis: Gout  Assessment and Plan of Treatment: You have a history of gout. Please continue with medication as prescribed.  Follow-up with your primary care physician .  Call your physician if you develop pain not relieved with pain regimen, fever and or swelling/redness in your extremity (ies).      Diagnosis: Transaminitis  Assessment and Plan of Treatment: You were found to have elevated liver enzymes. liver enzymes become elevated due to liver injury. You had an ultrasound of your liver which showed hepatomegaly which is unchnaged from previous imaging. An enlarged liver is a symptom of underlying disease. The likely cause of your liver inflammation is your sickle cell disease which causes iron to accumulate in your liver. Please follow- up with your heme/oncologist outpt for further work-up.

## 2023-12-17 NOTE — DISCHARGE NOTE PROVIDER - ATTENDING DISCHARGE PHYSICAL EXAMINATION:
Seen and examined the morning of discharge - 12/18/2023. Feeling well no new comlpaints. Heart regular lungs clear, jaundice improved. abd soft nontender, lower ext with stable edema.

## 2023-12-17 NOTE — DISCHARGE NOTE PROVIDER - NSDCMRMEDTOKEN_GEN_ALL_CORE_FT
allopurinol 100 mg oral tablet: 1 tab(s) orally once a day  folic acid 1 mg oral tablet: 1 tab(s) orally every 24 hours  oxyCODONE 30 mg oral tablet: 1 tab(s) orally every 4 hours as needed for  severe pain

## 2023-12-17 NOTE — DISCHARGE NOTE PROVIDER - HOSPITAL COURSE
43-year-old male with PMHx of gout, sickle cell disease (baseline Hb 6, follows with Dr. Jordan) with multiple admissions for pain crisis presented to the ED c/o back pain and leg pain for the past few days. He went to see Dr. Jordan outpatient today and had a hemoglobin of 4.7 and was told to come to the ED. In the ED, pt's vitals were T 98.4 F, , /81, RR 90% on RA. S/p 1L NS and 2x Dilaudid.  Labs significant for Hgb of 4.7, positive labs for hemolysis, transaminitis. Troponin wnl. CXR cardiomegaly, atelectasis and mild pulmonary vascular congestion. Admitted to medicine for sickle cell crisis and anemia. heme/onc consulted. pt s/p 2 units of PRBC; baseline hgb maintained. RUQ US obtained for elevated LFTs.         43-year-old male with PMHx of gout, sickle cell disease (baseline Hb 6, follows with Dr. Jordan) with multiple admissions for pain crisis presented to the ED c/o back pain and leg pain for the past few days. He went to see Dr. Jordan outpatient today and had a hemoglobin of 4.7 and was told to come to the ED. In the ED, pt's vitals were T 98.4 F, , /81, RR 90% on RA. S/p 1L NS and 2x Dilaudid.  Labs significant for Hgb of 4.7, positive labs for hemolysis, transaminitis. Troponin wnl. CXR cardiomegaly, atelectasis and mild pulmonary vascular congestion. Admitted to medicine for sickle cell crisis and anemia. heme/onc consulted. pt s/p 2 units of PRBC; baseline hgb maintained. heme/onc recc: no further transfusion. iron chelation for iron overload as outpatient.    RUQ US obtained for elevated LFTs. Case discussed with attending, pt medically stable for d/c. Please note that this a brief summary of hospital course please refer to daily progress notes and consult notes for full course and events         43-year-old male with PMHx of gout, sickle cell disease (baseline Hb 6, follows with Dr. Jordan) with multiple admissions for pain crisis presented to the ED c/o back pain and leg pain for the past few days. He went to see Dr. Jordan outpatient today and had a hemoglobin of 4.7 and was told to come to the ED. In the ED, pt's vitals were T 98.4 F, , /81, RR 90% on RA. S/p 1L NS and 2x Dilaudid.  Labs significant for Hgb of 4.7, positive labs for hemolysis, transaminitis. Troponin wnl. CXR cardiomegaly, atelectasis and mild pulmonary vascular congestion. Admitted to medicine for sickle cell crisis and anemia. heme/onc consulted. pt s/p 2 units of PRBC; baseline hgb maintained. heme/onc recc: no further transfusion. iron chelation for iron overload as outpatient.  RUQ US obtained for elevated LFTs. Case discussed with attending, pt medically stable for d/c. Please note that this a brief summary of hospital course please refer to daily progress notes and consult notes for full course and events

## 2023-12-17 NOTE — DISCHARGE NOTE PROVIDER - CARE PROVIDER_API CALL
[facility]  Medical Oncology  [address] [place] [id number]  Phone: [phone]  Fax: [phone]  Follow Up Time: 1 week   [facility]  Medical Oncology  [address] [place] [id number]  Phone: [phone]  Fax: [phone]  Follow Up Time: 1 week

## 2023-12-17 NOTE — DISCHARGE NOTE PROVIDER - PROVIDER TOKENS
PROVIDER:[TOKEN:[[id number]],FOLLOWUP:[1 week]] PROVIDER:[TOKEN:[[id number]],FOLLOWUP:[1 week]]

## 2023-12-18 ENCOUNTER — TRANSCRIPTION ENCOUNTER (OUTPATIENT)
Age: 43
End: 2023-12-18

## 2023-12-18 VITALS
DIASTOLIC BLOOD PRESSURE: 89 MMHG | RESPIRATION RATE: 17 BRPM | TEMPERATURE: 98 F | OXYGEN SATURATION: 96 % | HEART RATE: 77 BPM | SYSTOLIC BLOOD PRESSURE: 156 MMHG

## 2023-12-18 LAB
ALBUMIN SERPL ELPH-MCNC: 3.3 G/DL — LOW (ref 3.5–5)
ALBUMIN SERPL ELPH-MCNC: 3.3 G/DL — LOW (ref 3.5–5)
ALP SERPL-CCNC: 138 U/L — HIGH (ref 40–120)
ALP SERPL-CCNC: 138 U/L — HIGH (ref 40–120)
ALT FLD-CCNC: 83 U/L DA — HIGH (ref 10–60)
ALT FLD-CCNC: 83 U/L DA — HIGH (ref 10–60)
ANION GAP SERPL CALC-SCNC: 5 MMOL/L — SIGNIFICANT CHANGE UP (ref 5–17)
ANION GAP SERPL CALC-SCNC: 5 MMOL/L — SIGNIFICANT CHANGE UP (ref 5–17)
AST SERPL-CCNC: 169 U/L — HIGH (ref 10–40)
AST SERPL-CCNC: 169 U/L — HIGH (ref 10–40)
BASOPHILS # BLD AUTO: 0.09 K/UL — SIGNIFICANT CHANGE UP (ref 0–0.2)
BASOPHILS # BLD AUTO: 0.09 K/UL — SIGNIFICANT CHANGE UP (ref 0–0.2)
BASOPHILS NFR BLD AUTO: 1.2 % — SIGNIFICANT CHANGE UP (ref 0–2)
BASOPHILS NFR BLD AUTO: 1.2 % — SIGNIFICANT CHANGE UP (ref 0–2)
BILIRUB SERPL-MCNC: 5.2 MG/DL — HIGH (ref 0.2–1.2)
BILIRUB SERPL-MCNC: 5.2 MG/DL — HIGH (ref 0.2–1.2)
BUN SERPL-MCNC: 20 MG/DL — HIGH (ref 7–18)
BUN SERPL-MCNC: 20 MG/DL — HIGH (ref 7–18)
CALCIUM SERPL-MCNC: 8.9 MG/DL — SIGNIFICANT CHANGE UP (ref 8.4–10.5)
CALCIUM SERPL-MCNC: 8.9 MG/DL — SIGNIFICANT CHANGE UP (ref 8.4–10.5)
CHLORIDE SERPL-SCNC: 112 MMOL/L — HIGH (ref 96–108)
CHLORIDE SERPL-SCNC: 112 MMOL/L — HIGH (ref 96–108)
CO2 SERPL-SCNC: 22 MMOL/L — SIGNIFICANT CHANGE UP (ref 22–31)
CO2 SERPL-SCNC: 22 MMOL/L — SIGNIFICANT CHANGE UP (ref 22–31)
CREAT SERPL-MCNC: 0.91 MG/DL — SIGNIFICANT CHANGE UP (ref 0.5–1.3)
CREAT SERPL-MCNC: 0.91 MG/DL — SIGNIFICANT CHANGE UP (ref 0.5–1.3)
EGFR: 107 ML/MIN/1.73M2 — SIGNIFICANT CHANGE UP
EGFR: 107 ML/MIN/1.73M2 — SIGNIFICANT CHANGE UP
EOSINOPHIL # BLD AUTO: 0.15 K/UL — SIGNIFICANT CHANGE UP (ref 0–0.5)
EOSINOPHIL # BLD AUTO: 0.15 K/UL — SIGNIFICANT CHANGE UP (ref 0–0.5)
EOSINOPHIL NFR BLD AUTO: 2 % — SIGNIFICANT CHANGE UP (ref 0–6)
EOSINOPHIL NFR BLD AUTO: 2 % — SIGNIFICANT CHANGE UP (ref 0–6)
GLUCOSE SERPL-MCNC: 105 MG/DL — HIGH (ref 70–99)
GLUCOSE SERPL-MCNC: 105 MG/DL — HIGH (ref 70–99)
HCT VFR BLD CALC: 22.1 % — LOW (ref 39–50)
HCT VFR BLD CALC: 22.1 % — LOW (ref 39–50)
HEMOGLOBIN INTERPRETATION: SIGNIFICANT CHANGE UP
HEMOGLOBIN INTERPRETATION: SIGNIFICANT CHANGE UP
HGB A MFR BLD: 41.2 % — LOW (ref 95.8–98)
HGB A MFR BLD: 41.2 % — LOW (ref 95.8–98)
HGB A2 MFR BLD: 2.9 % — SIGNIFICANT CHANGE UP (ref 2–3.2)
HGB A2 MFR BLD: 2.9 % — SIGNIFICANT CHANGE UP (ref 2–3.2)
HGB BLD-MCNC: 7.6 G/DL — LOW (ref 13–17)
HGB BLD-MCNC: 7.6 G/DL — LOW (ref 13–17)
HGB F MFR BLD: 3.4 % — HIGH (ref 0–1)
HGB F MFR BLD: 3.4 % — HIGH (ref 0–1)
HGB S BLD QL: POSITIVE
HGB S BLD QL: POSITIVE
HGB S MFR BLD: 52.5 % — HIGH
HGB S MFR BLD: 52.5 % — HIGH
IMM GRANULOCYTES NFR BLD AUTO: 0.4 % — SIGNIFICANT CHANGE UP (ref 0–0.9)
IMM GRANULOCYTES NFR BLD AUTO: 0.4 % — SIGNIFICANT CHANGE UP (ref 0–0.9)
LYMPHOCYTES # BLD AUTO: 2.58 K/UL — SIGNIFICANT CHANGE UP (ref 1–3.3)
LYMPHOCYTES # BLD AUTO: 2.58 K/UL — SIGNIFICANT CHANGE UP (ref 1–3.3)
LYMPHOCYTES # BLD AUTO: 33.6 % — SIGNIFICANT CHANGE UP (ref 13–44)
LYMPHOCYTES # BLD AUTO: 33.6 % — SIGNIFICANT CHANGE UP (ref 13–44)
MAGNESIUM SERPL-MCNC: 1.7 MG/DL — SIGNIFICANT CHANGE UP (ref 1.6–2.6)
MAGNESIUM SERPL-MCNC: 1.7 MG/DL — SIGNIFICANT CHANGE UP (ref 1.6–2.6)
MCHC RBC-ENTMCNC: 30.5 PG — SIGNIFICANT CHANGE UP (ref 27–34)
MCHC RBC-ENTMCNC: 30.5 PG — SIGNIFICANT CHANGE UP (ref 27–34)
MCHC RBC-ENTMCNC: 34.4 GM/DL — SIGNIFICANT CHANGE UP (ref 32–36)
MCHC RBC-ENTMCNC: 34.4 GM/DL — SIGNIFICANT CHANGE UP (ref 32–36)
MCV RBC AUTO: 88.8 FL — SIGNIFICANT CHANGE UP (ref 80–100)
MCV RBC AUTO: 88.8 FL — SIGNIFICANT CHANGE UP (ref 80–100)
MONOCYTES # BLD AUTO: 1.04 K/UL — HIGH (ref 0–0.9)
MONOCYTES # BLD AUTO: 1.04 K/UL — HIGH (ref 0–0.9)
MONOCYTES NFR BLD AUTO: 13.6 % — SIGNIFICANT CHANGE UP (ref 2–14)
MONOCYTES NFR BLD AUTO: 13.6 % — SIGNIFICANT CHANGE UP (ref 2–14)
NEUTROPHILS # BLD AUTO: 3.78 K/UL — SIGNIFICANT CHANGE UP (ref 1.8–7.4)
NEUTROPHILS # BLD AUTO: 3.78 K/UL — SIGNIFICANT CHANGE UP (ref 1.8–7.4)
NEUTROPHILS NFR BLD AUTO: 49.2 % — SIGNIFICANT CHANGE UP (ref 43–77)
NEUTROPHILS NFR BLD AUTO: 49.2 % — SIGNIFICANT CHANGE UP (ref 43–77)
NRBC # BLD: 1 /100 WBCS — HIGH (ref 0–0)
NRBC # BLD: 1 /100 WBCS — HIGH (ref 0–0)
PHOSPHATE SERPL-MCNC: 4 MG/DL — SIGNIFICANT CHANGE UP (ref 2.5–4.5)
PHOSPHATE SERPL-MCNC: 4 MG/DL — SIGNIFICANT CHANGE UP (ref 2.5–4.5)
PLATELET # BLD AUTO: 272 K/UL — SIGNIFICANT CHANGE UP (ref 150–400)
PLATELET # BLD AUTO: 272 K/UL — SIGNIFICANT CHANGE UP (ref 150–400)
POTASSIUM SERPL-MCNC: 4.8 MMOL/L — SIGNIFICANT CHANGE UP (ref 3.5–5.3)
POTASSIUM SERPL-MCNC: 4.8 MMOL/L — SIGNIFICANT CHANGE UP (ref 3.5–5.3)
POTASSIUM SERPL-SCNC: 4.8 MMOL/L — SIGNIFICANT CHANGE UP (ref 3.5–5.3)
POTASSIUM SERPL-SCNC: 4.8 MMOL/L — SIGNIFICANT CHANGE UP (ref 3.5–5.3)
PROT SERPL-MCNC: 7.8 G/DL — SIGNIFICANT CHANGE UP (ref 6–8.3)
PROT SERPL-MCNC: 7.8 G/DL — SIGNIFICANT CHANGE UP (ref 6–8.3)
RBC # BLD: 2.49 M/UL — LOW (ref 4.2–5.8)
RBC # BLD: 2.49 M/UL — LOW (ref 4.2–5.8)
RBC # FLD: 20.4 % — HIGH (ref 10.3–14.5)
RBC # FLD: 20.4 % — HIGH (ref 10.3–14.5)
SODIUM SERPL-SCNC: 139 MMOL/L — SIGNIFICANT CHANGE UP (ref 135–145)
SODIUM SERPL-SCNC: 139 MMOL/L — SIGNIFICANT CHANGE UP (ref 135–145)
SOLUBILITY: POSITIVE
SOLUBILITY: POSITIVE
WBC # BLD: 7.67 K/UL — SIGNIFICANT CHANGE UP (ref 3.8–10.5)
WBC # BLD: 7.67 K/UL — SIGNIFICANT CHANGE UP (ref 3.8–10.5)
WBC # FLD AUTO: 7.67 K/UL — SIGNIFICANT CHANGE UP (ref 3.8–10.5)
WBC # FLD AUTO: 7.67 K/UL — SIGNIFICANT CHANGE UP (ref 3.8–10.5)

## 2023-12-18 PROCEDURE — 85045 AUTOMATED RETICULOCYTE COUNT: CPT

## 2023-12-18 PROCEDURE — 71045 X-RAY EXAM CHEST 1 VIEW: CPT

## 2023-12-18 PROCEDURE — 82728 ASSAY OF FERRITIN: CPT

## 2023-12-18 PROCEDURE — 96376 TX/PRO/DX INJ SAME DRUG ADON: CPT

## 2023-12-18 PROCEDURE — 80061 LIPID PANEL: CPT

## 2023-12-18 PROCEDURE — 83540 ASSAY OF IRON: CPT

## 2023-12-18 PROCEDURE — 86901 BLOOD TYPING SEROLOGIC RH(D): CPT

## 2023-12-18 PROCEDURE — 86900 BLOOD TYPING SEROLOGIC ABO: CPT

## 2023-12-18 PROCEDURE — 96375 TX/PRO/DX INJ NEW DRUG ADDON: CPT

## 2023-12-18 PROCEDURE — 82248 BILIRUBIN DIRECT: CPT

## 2023-12-18 PROCEDURE — 36415 COLL VENOUS BLD VENIPUNCTURE: CPT

## 2023-12-18 PROCEDURE — 82247 BILIRUBIN TOTAL: CPT

## 2023-12-18 PROCEDURE — 81001 URINALYSIS AUTO W/SCOPE: CPT

## 2023-12-18 PROCEDURE — 84145 PROCALCITONIN (PCT): CPT

## 2023-12-18 PROCEDURE — 80048 BASIC METABOLIC PNL TOTAL CA: CPT

## 2023-12-18 PROCEDURE — 86922 COMPATIBILITY TEST ANTIGLOB: CPT

## 2023-12-18 PROCEDURE — 36430 TRANSFUSION BLD/BLD COMPNT: CPT

## 2023-12-18 PROCEDURE — 96374 THER/PROPH/DIAG INJ IV PUSH: CPT

## 2023-12-18 PROCEDURE — 0225U NFCT DS DNA&RNA 21 SARSCOV2: CPT

## 2023-12-18 PROCEDURE — 85025 COMPLETE CBC W/AUTO DIFF WBC: CPT

## 2023-12-18 PROCEDURE — 93005 ELECTROCARDIOGRAM TRACING: CPT

## 2023-12-18 PROCEDURE — 99285 EMERGENCY DEPT VISIT HI MDM: CPT | Mod: 25

## 2023-12-18 PROCEDURE — 83010 ASSAY OF HAPTOGLOBIN QUANT: CPT

## 2023-12-18 PROCEDURE — P9040: CPT

## 2023-12-18 PROCEDURE — 99239 HOSP IP/OBS DSCHRG MGMT >30: CPT

## 2023-12-18 PROCEDURE — 76705 ECHO EXAM OF ABDOMEN: CPT

## 2023-12-18 PROCEDURE — 86850 RBC ANTIBODY SCREEN: CPT

## 2023-12-18 PROCEDURE — 83735 ASSAY OF MAGNESIUM: CPT

## 2023-12-18 PROCEDURE — 80053 COMPREHEN METABOLIC PANEL: CPT

## 2023-12-18 PROCEDURE — 84100 ASSAY OF PHOSPHORUS: CPT

## 2023-12-18 PROCEDURE — 83615 LACTATE (LD) (LDH) ENZYME: CPT

## 2023-12-18 PROCEDURE — 84484 ASSAY OF TROPONIN QUANT: CPT

## 2023-12-18 PROCEDURE — 83550 IRON BINDING TEST: CPT

## 2023-12-18 PROCEDURE — 83020 HEMOGLOBIN ELECTROPHORESIS: CPT

## 2023-12-18 RX ADMIN — HYDROMORPHONE HYDROCHLORIDE 2 MILLIGRAM(S): 2 INJECTION INTRAMUSCULAR; INTRAVENOUS; SUBCUTANEOUS at 08:10

## 2023-12-18 RX ADMIN — HYDROMORPHONE HYDROCHLORIDE 2 MILLIGRAM(S): 2 INJECTION INTRAMUSCULAR; INTRAVENOUS; SUBCUTANEOUS at 03:16

## 2023-12-18 RX ADMIN — HYDROMORPHONE HYDROCHLORIDE 2 MILLIGRAM(S): 2 INJECTION INTRAMUSCULAR; INTRAVENOUS; SUBCUTANEOUS at 02:46

## 2023-12-18 RX ADMIN — HYDROMORPHONE HYDROCHLORIDE 2 MILLIGRAM(S): 2 INJECTION INTRAMUSCULAR; INTRAVENOUS; SUBCUTANEOUS at 00:36

## 2023-12-18 RX ADMIN — HYDROMORPHONE HYDROCHLORIDE 2 MILLIGRAM(S): 2 INJECTION INTRAMUSCULAR; INTRAVENOUS; SUBCUTANEOUS at 10:29

## 2023-12-18 RX ADMIN — HYDROMORPHONE HYDROCHLORIDE 2 MILLIGRAM(S): 2 INJECTION INTRAMUSCULAR; INTRAVENOUS; SUBCUTANEOUS at 05:51

## 2023-12-18 RX ADMIN — HYDROMORPHONE HYDROCHLORIDE 2 MILLIGRAM(S): 2 INJECTION INTRAMUSCULAR; INTRAVENOUS; SUBCUTANEOUS at 05:10

## 2023-12-18 RX ADMIN — Medication 1 MILLIGRAM(S): at 13:11

## 2023-12-18 RX ADMIN — HYDROMORPHONE HYDROCHLORIDE 2 MILLIGRAM(S): 2 INJECTION INTRAMUSCULAR; INTRAVENOUS; SUBCUTANEOUS at 13:11

## 2023-12-18 RX ADMIN — HYDROMORPHONE HYDROCHLORIDE 2 MILLIGRAM(S): 2 INJECTION INTRAMUSCULAR; INTRAVENOUS; SUBCUTANEOUS at 00:06

## 2023-12-18 RX ADMIN — Medication 100 MILLIGRAM(S): at 13:11

## 2023-12-18 NOTE — DISCHARGE NOTE NURSING/CASE MANAGEMENT/SOCIAL WORK - NSDCPEFALRISK_GEN_ALL_CORE
For information on Fall & Injury Prevention, visit: [email or web address] OR  [email or web address] OR  https://www.cdc.gov/steadi/patient.html For information on Fall & Injury Prevention, visit: [email or web address] OR  [email or web address] OR  https://www.cdc.gov/steadi/patient.html

## 2023-12-18 NOTE — PROGRESS NOTE ADULT - ASSESSMENT
# Sickle Cell Crisis  - transfuse, maintain baseline hgb ~5-6  - pain control  - Primary team discussed with Dr. Jordan-stop transfusion, iron chelation out patient  - JAIME unremarkable    #Upper respiratory infection  - rapid rvp and covid negative  - cxr with linear density but also has been shown previously  - hold antibiotics for now  - no signs pneumonia, procal negative    #MATA  -improving continue monitoring      #Transaminitis  - in the setting of active hemolysis  - need to trend BMP + hepatic function panel  - check US RUQ    # lower extremity edema  - chronic in nature  - recommend echo outpatient
· Assessment	  Mr Downey is a pleasant 43 year old gentleman with history of sickle cell anemia, now with URI with sickle cell pain crisis    1. Sickle cell crisis:   ·	Patient follows with Dr. Jordan from [facility]  ·	Baseline hb of 5  ·	Currently stable.  ·	s/p 1 unit of PRBC transfusion last Thursday  ·	Will continue to follow hb closely.  ·	If it drops further below baselines, can transfuse  ·	If any sign of organ failure, will need more aggressive approach to transfusion/exchange transfusion  ·	Pain management per primary team    Hb stable at 7.4  LFT also stable  pain under control  will discharge
# Sickle Cell Crisis  - transfuse, maintain baseline hgb ~5-6  - pain control  - Primary team discussed with Dr. Jordan-stop transfusion, iron chelation out patient  - Check US RUQ    #Upper respiratory infection  - rapid rvp and covid negative  - cxr with linear density but also has been shown previously  - hold antibiotics for now  - no signs pneumonia, procal negative    #MATA  -improving continue monitoring      #Transaminitis  - in the setting of active hemolysis  - need to trend BMP + hepatic function panel  - check US RUQ    # lower extremity edema  - chronic in nature  - recommend echo outpatient
No

## 2023-12-18 NOTE — PROGRESS NOTE ADULT - SUBJECTIVE AND OBJECTIVE BOX
HPI:  43-year-old male with PMHx of gout, sickle cell disease (baseline Hb 6, follows with Dr. Jordan) with multiple admissions for pain crisis presented to the ED c/o back pain and leg pain for the past few days.  Reports that the pain is similar to his prior sickle cell crises. Reports that he recently had dry cough, chills, and sweating for the past week at home. Unaware of any sick contacts. He denies SOB, chest pain, palpitations, hematuria, dysuria, nausea, vomiting, diarrhea, constipation.  He went to see Dr. Jordan outpatient today and had a hemoglobin of 4.7 and was told to come to the ED. Went to [place] 2 weeks ago, otherwise no recent travel. Of note pt has a port for transfusions. Denies any prior transfusion reactions.    In the ED, pt's vitals were T 98.4 F, , /81, RR 90% on RA. S/p 1L NS and 2x Dilaudid.   Labs significant for Hgb of 4.7, positive labs for hemolysis, transaminitis. Troponin wnl. CXR wnl.     On current evaluation, patient reports his pain is improved - yesterday, it was 15/10 and now it is 8/10 in severity.  Has bilateral lower extremity edema that comes and goes, patient states that he has had this for many years.  (15 Dec 2023 09:33)     Pt is seen and examined  pt is awake and lying in bed/out of bed to chair  pt seems comfortable and denies any complaints at this time    ROS:  Negative except for:    MEDICATIONS  (STANDING):  allopurinol 100 milliGRAM(s) Oral daily  folic acid 1 milliGRAM(s) Oral every 24 hours    MEDICATIONS  (PRN):  acetaminophen     Tablet .. 650 milliGRAM(s) Oral every 6 hours PRN Temp greater or equal to 38C (100.4F), Mild Pain (1 - 3)  aluminum hydroxide/magnesium hydroxide/simethicone Suspension 30 milliLiter(s) Oral every 4 hours PRN Dyspepsia  HYDROmorphone  Injectable 2 milliGRAM(s) IV Push every 2 hours PRN Severe Pain (7 - 10)  melatonin 3 milliGRAM(s) Oral at bedtime PRN Insomnia  ondansetron Injectable 4 milliGRAM(s) IV Push every 8 hours PRN Nausea and/or Vomiting      Allergies    piperacillin-tazobactam (Urticaria)  hydroxyurea (Other)  penicillin (Pruritus)  ceftriaxone (Anaphylaxis)    Intolerances        Vital Signs Last 24 Hrs  T(C): 36.6 (18 Dec 2023 05:27), Max: 37 (17 Dec 2023 21:08)  T(F): 97.8 (18 Dec 2023 05:27), Max: 98.6 (17 Dec 2023 21:08)  HR: 75 (18 Dec 2023 05:27) (69 - 81)  BP: 151/87 (18 Dec 2023 05:27) (146/90 - 160/80)  BP(mean): --  RR: 17 (18 Dec 2023 05:27) (16 - 17)  SpO2: 97% (18 Dec 2023 05:27) (94% - 97%)    Parameters below as of 18 Dec 2023 05:27  Patient On (Oxygen Delivery Method): room air        PHYSICAL EXAM  General: adult in NAD  HEENT: clear oropharynx, anicteric sclera, pink conjunctiva  Neck: supple  CV: normal S1/S2 with no murmur rubs or gallops  Lungs: positive air movement b/l ant lungs,clear to auscultation, no wheezes, no rales  Abdomen: soft non-tender non-distended, no hepatosplenomegaly  Ext: no clubbing cyanosis or edema  Skin: no rashes and no petechiae  Neuro: alert and oriented X 4, no focal deficits  LABS:                          7.6    7.67  )-----------( 272      ( 18 Dec 2023 06:40 )             22.1         Mean Cell Volume : 88.8 fl  Mean Cell Hemoglobin : 30.5 pg  Mean Cell Hemoglobin Concentration : 34.4 gm/dL  Auto Neutrophil # : 3.78 K/uL  Auto Lymphocyte # : 2.58 K/uL  Auto Monocyte # : 1.04 K/uL  Auto Eosinophil # : 0.15 K/uL  Auto Basophil # : 0.09 K/uL  Auto Neutrophil % : 49.2 %  Auto Lymphocyte % : 33.6 %  Auto Monocyte % : 13.6 %  Auto Eosinophil % : 2.0 %  Auto Basophil % : 1.2 %    Serial CBC  Hematocrit 22.1  Hemoglobin 7.6  Plat 272  RBC 2.49  WBC 7.67  Serial CBC  Hematocrit 21.6  Hemoglobin 7.5  Plat 307  RBC 2.44  WBC 8.94  Serial CBC  Hematocrit 21.2  Hemoglobin 7.3  Plat 310  RBC 2.43  WBC 9.99  Serial CBC  Hematocrit 20.2  Hemoglobin 7.2  Plat 297  RBC 2.30  WBC 8.85  Serial CBC  Hematocrit 13.5  Hemoglobin 4.7  Plat 299  RBC 1.48  WBC 10.31    12-18    139  |  112<H>  |  20<H>  ----------------------------<  105<H>  4.8   |  22  |  0.91    Ca    8.9      18 Dec 2023 06:40  Phos  4.0     12-18  Mg     1.7     12-18    TPro  7.8  /  Alb  3.3<L>  /  TBili  5.2<H>  /  DBili  x   /  AST  169<H>  /  ALT  83<H>  /  AlkPhos  138<H>  12-18          Reticulocyte Percent: 21.8 % (12-15 @ 04:39)  Iron - Total Binding Capacity.: 272 ug/dL (12-15 @ 04:39)  Ferritin: 3485 ng/mL (12-15 @ 04:39)            BLOOD SMEAR INTERPRETATION:       RADIOLOGY & ADDITIONAL STUDIES:    
INTERVAL HPI/OVERNIGHT EVENTS:   Seen and examined this AM, in good spirits. persistent pleuritic chest pain on the right that has been chronic in nature. Pain being well controlled. no sob, no chest pain no abdominal pain. persistent right lower ext pitting edema right    REVIEW OF SYSTEMS:  Negative except per HPI    Vital Signs Last 24 Hrs  T(C): 36.8 (16 Dec 2023 13:10), Max: 36.8 (16 Dec 2023 13:10)  T(F): 98.2 (16 Dec 2023 13:10), Max: 98.2 (16 Dec 2023 13:10)  HR: 79 (16 Dec 2023 13:10) (74 - 79)  BP: 132/72 (16 Dec 2023 13:10) (132/72 - 162/94)  BP(mean): 87 (16 Dec 2023 13:10) (87 - 87)  RR: 16 (16 Dec 2023 13:10) (16 - 18)  SpO2: 98% (16 Dec 2023 13:10) (95% - 98%)    Parameters below as of 16 Dec 2023 13:10  Patient On (Oxygen Delivery Method): room air        PHYSICAL EXAMINATION:  Physical: Obvious jaundiced, aaox3, abdomen soft nontender, right chest wall tenderness, pitting edema right lower ext greater than left                          7.3    9.99  )-----------( 310      ( 16 Dec 2023 07:24 )             21.2     12-16    138  |  112<H>  |  20<H>  ----------------------------<  96  4.5   |  23  |  0.97    Ca    8.7      16 Dec 2023 07:24  Phos  3.3     12-16  Mg     1.8     12-16    TPro  7.3  /  Alb  3.3<L>  /  TBili  5.0<H>  /  DBili  x   /  AST  169<H>  /  ALT  89<H>  /  AlkPhos  149<H>  12-16    LIVER FUNCTIONS - ( 16 Dec 2023 07:24 )  Alb: 3.3 g/dL / Pro: 7.3 g/dL / ALK PHOS: 149 U/L / ALT: 89 U/L DA / AST: 169 U/L / GGT: x                   CAPILLARY BLOOD GLUCOSE      RADIOLOGY & ADDITIONAL TESTS:                  
    INTERVAL HPI/OVERNIGHT EVENTS:   Feeling improved today no new symptoms      Vital Signs Last 24 Hrs  T(C): 36.9 (17 Dec 2023 12:45), Max: 36.9 (17 Dec 2023 12:45)  T(F): 98.4 (17 Dec 2023 12:45), Max: 98.4 (17 Dec 2023 12:45)  HR: 69 (17 Dec 2023 12:45) (69 - 83)  BP: 160/80 (17 Dec 2023 12:45) (143/77 - 160/80)  BP(mean): --  RR: 16 (17 Dec 2023 12:45) (16 - 18)  SpO2: 96% (17 Dec 2023 12:45) (95% - 98%)    Parameters below as of 17 Dec 2023 12:45  Patient On (Oxygen Delivery Method): room air        PHYSICAL EXAMINATION:  Physical: jaundice improved, aaox3, abdomen soft nontender, right chest wall tenderness, pitting edema right lower ext greater than left                          7.5    8.94  )-----------( 307      ( 17 Dec 2023 06:21 )             21.6     12-17    138  |  111<H>  |  22<H>  ----------------------------<  91  4.5   |  23  |  0.93    Ca    8.4      17 Dec 2023 06:21  Phos  4.1     12-17  Mg     1.8     12-17    TPro  7.6  /  Alb  3.1<L>  /  TBili  5.5<H>  /  DBili  x   /  AST  165<H>  /  ALT  86<H>  /  AlkPhos  143<H>  12-17    LIVER FUNCTIONS - ( 17 Dec 2023 06:21 )  Alb: 3.1 g/dL / Pro: 7.6 g/dL / ALK PHOS: 143 U/L / ALT: 86 U/L DA / AST: 165 U/L / GGT: x                   CAPILLARY BLOOD GLUCOSE      RADIOLOGY & ADDITIONAL TESTS:

## 2023-12-18 NOTE — DISCHARGE NOTE NURSING/CASE MANAGEMENT/SOCIAL WORK - PATIENT PORTAL LINK FT
You can access the FollowMyHealth Patient Portal offered by [facility] by registering at the following website: [email or web address] By joining [employer]’s FollowMyHealth portal, you will also be able to view your health information using other applications (apps) compatible with our system. You can access the FollowMyHealth Patient Portal offered by [facility] by registering at the following website: [email or web address]. By joining [facility]’s FollowMyHealth portal, you will also be able to view your health information using other applications (apps) compatible with our system.

## 2024-01-01 NOTE — PROVIDER CONTACT NOTE (CRITICAL VALUE NOTIFICATION) - DATE AND TIME:
Health Maintenance       Polio (IPV) Vaccine (2 of 4 - 4-dose series)  Order placed this encounter    HIB Vaccine (2 of 3 - PRP-OMP Series)  Order placed this encounter    Rotavirus Vaccine (2 of 3 - 3-dose series)  Order placed this encounter    DTaP/Tdap/Td Vaccine (2 - DTaP)  Order placed this encounter    Well Child Exam 4 Months (Once)  Due since 2024    Pneumococcal Vaccine 0-64 (2 of 4 - PCV)  Order placed this encounter           Following review of the above:  Pended orders    Note: Refer to final orders and clinician documentation.       04-Mar-2022 07:35 04-Mar-2022 07:44

## 2024-01-12 ENCOUNTER — INPATIENT (INPATIENT)
Facility: HOSPITAL | Age: 44
LOS: 2 days | Discharge: HOME | DRG: 812 | End: 2024-01-15
Attending: STUDENT IN AN ORGANIZED HEALTH CARE EDUCATION/TRAINING PROGRAM | Admitting: STUDENT IN AN ORGANIZED HEALTH CARE EDUCATION/TRAINING PROGRAM
Payer: MEDICAID

## 2024-01-12 VITALS
SYSTOLIC BLOOD PRESSURE: 159 MMHG | OXYGEN SATURATION: 99 % | RESPIRATION RATE: 992 BRPM | HEART RATE: 110 BPM | HEIGHT: 69 IN | WEIGHT: 195.11 LBS | DIASTOLIC BLOOD PRESSURE: 77 MMHG | TEMPERATURE: 99 F

## 2024-01-12 DIAGNOSIS — Z90.49 ACQUIRED ABSENCE OF OTHER SPECIFIED PARTS OF DIGESTIVE TRACT: Chronic | ICD-10-CM

## 2024-01-12 PROCEDURE — 99285 EMERGENCY DEPT VISIT HI MDM: CPT

## 2024-01-12 RX ORDER — HYDROMORPHONE HYDROCHLORIDE 2 MG/ML
1 INJECTION INTRAMUSCULAR; INTRAVENOUS; SUBCUTANEOUS ONCE
Refills: 0 | Status: DISCONTINUED | OUTPATIENT
Start: 2024-01-12 | End: 2024-01-12

## 2024-01-12 NOTE — ED PROVIDER NOTE - CLINICAL SUMMARY MEDICAL DECISION MAKING FREE TEXT BOX
Patient presenting for anemia. also noting bodyache consistent with sickle cell crisis. will obtain lab, assess hgb., pain control and reassess

## 2024-01-12 NOTE — ED PROVIDER NOTE - OBJECTIVE STATEMENT
43 y.o presenting for anemia. history of sickle. baseline has schleral icterus. endorses recent hgb of of approx 5. baseline 6-7. denies blood per rectum. endorses generalized bodyache consistent with his prior sickle cell crisis

## 2024-01-13 DIAGNOSIS — M10.9 GOUT, UNSPECIFIED: ICD-10-CM

## 2024-01-13 DIAGNOSIS — D57.00 HB-SS DISEASE WITH CRISIS, UNSPECIFIED: ICD-10-CM

## 2024-01-13 DIAGNOSIS — Z29.9 ENCOUNTER FOR PROPHYLACTIC MEASURES, UNSPECIFIED: ICD-10-CM

## 2024-01-13 DIAGNOSIS — D63.8 ANEMIA IN OTHER CHRONIC DISEASES CLASSIFIED ELSEWHERE: ICD-10-CM

## 2024-01-13 DIAGNOSIS — R74.01 ELEVATION OF LEVELS OF LIVER TRANSAMINASE LEVELS: ICD-10-CM

## 2024-01-13 LAB
ALBUMIN SERPL ELPH-MCNC: 3.6 G/DL — SIGNIFICANT CHANGE UP (ref 3.5–5)
ALBUMIN SERPL ELPH-MCNC: 3.6 G/DL — SIGNIFICANT CHANGE UP (ref 3.5–5)
ALP SERPL-CCNC: 165 U/L — HIGH (ref 40–120)
ALP SERPL-CCNC: 165 U/L — HIGH (ref 40–120)
ALT FLD-CCNC: 117 U/L DA — HIGH (ref 10–60)
ALT FLD-CCNC: 117 U/L DA — HIGH (ref 10–60)
ANION GAP SERPL CALC-SCNC: 5 MMOL/L — SIGNIFICANT CHANGE UP (ref 5–17)
ANION GAP SERPL CALC-SCNC: 5 MMOL/L — SIGNIFICANT CHANGE UP (ref 5–17)
APTT BLD: 31.1 SEC — SIGNIFICANT CHANGE UP (ref 24.5–35.6)
APTT BLD: 31.1 SEC — SIGNIFICANT CHANGE UP (ref 24.5–35.6)
AST SERPL-CCNC: 200 U/L — HIGH (ref 10–40)
AST SERPL-CCNC: 200 U/L — HIGH (ref 10–40)
BASOPHILS # BLD AUTO: 0.11 K/UL — SIGNIFICANT CHANGE UP (ref 0–0.2)
BASOPHILS # BLD AUTO: 0.11 K/UL — SIGNIFICANT CHANGE UP (ref 0–0.2)
BASOPHILS NFR BLD AUTO: 1 % — SIGNIFICANT CHANGE UP (ref 0–2)
BASOPHILS NFR BLD AUTO: 1 % — SIGNIFICANT CHANGE UP (ref 0–2)
BILIRUB DIRECT SERPL-MCNC: 3.5 MG/DL — HIGH (ref 0–0.3)
BILIRUB DIRECT SERPL-MCNC: 3.5 MG/DL — HIGH (ref 0–0.3)
BILIRUB SERPL-MCNC: 7.8 MG/DL — HIGH (ref 0.2–1.2)
BILIRUB SERPL-MCNC: 7.8 MG/DL — HIGH (ref 0.2–1.2)
BUN SERPL-MCNC: 25 MG/DL — HIGH (ref 7–18)
BUN SERPL-MCNC: 25 MG/DL — HIGH (ref 7–18)
CALCIUM SERPL-MCNC: 8.8 MG/DL — SIGNIFICANT CHANGE UP (ref 8.4–10.5)
CALCIUM SERPL-MCNC: 8.8 MG/DL — SIGNIFICANT CHANGE UP (ref 8.4–10.5)
CHLORIDE SERPL-SCNC: 105 MMOL/L — SIGNIFICANT CHANGE UP (ref 96–108)
CHLORIDE SERPL-SCNC: 105 MMOL/L — SIGNIFICANT CHANGE UP (ref 96–108)
CO2 SERPL-SCNC: 23 MMOL/L — SIGNIFICANT CHANGE UP (ref 22–31)
CO2 SERPL-SCNC: 23 MMOL/L — SIGNIFICANT CHANGE UP (ref 22–31)
CREAT SERPL-MCNC: 1.29 MG/DL — SIGNIFICANT CHANGE UP (ref 0.5–1.3)
CREAT SERPL-MCNC: 1.29 MG/DL — SIGNIFICANT CHANGE UP (ref 0.5–1.3)
EGFR: 70 ML/MIN/1.73M2 — SIGNIFICANT CHANGE UP
EGFR: 70 ML/MIN/1.73M2 — SIGNIFICANT CHANGE UP
EOSINOPHIL # BLD AUTO: 0.03 K/UL — SIGNIFICANT CHANGE UP (ref 0–0.5)
EOSINOPHIL # BLD AUTO: 0.03 K/UL — SIGNIFICANT CHANGE UP (ref 0–0.5)
EOSINOPHIL NFR BLD AUTO: 0.3 % — SIGNIFICANT CHANGE UP (ref 0–6)
EOSINOPHIL NFR BLD AUTO: 0.3 % — SIGNIFICANT CHANGE UP (ref 0–6)
FOLATE SERPL-MCNC: >20 NG/ML — SIGNIFICANT CHANGE UP
FOLATE SERPL-MCNC: >20 NG/ML — SIGNIFICANT CHANGE UP
GLUCOSE SERPL-MCNC: 114 MG/DL — HIGH (ref 70–99)
GLUCOSE SERPL-MCNC: 114 MG/DL — HIGH (ref 70–99)
HCT VFR BLD CALC: 16.5 % — CRITICAL LOW (ref 39–50)
HCT VFR BLD CALC: 16.5 % — CRITICAL LOW (ref 39–50)
HGB BLD-MCNC: 5.7 G/DL — CRITICAL LOW (ref 13–17)
HGB BLD-MCNC: 5.7 G/DL — CRITICAL LOW (ref 13–17)
IMM GRANULOCYTES NFR BLD AUTO: 0.5 % — SIGNIFICANT CHANGE UP (ref 0–0.9)
IMM GRANULOCYTES NFR BLD AUTO: 0.5 % — SIGNIFICANT CHANGE UP (ref 0–0.9)
INR BLD: 1.15 RATIO — SIGNIFICANT CHANGE UP (ref 0.85–1.18)
INR BLD: 1.15 RATIO — SIGNIFICANT CHANGE UP (ref 0.85–1.18)
IRON SATN MFR SERPL: 102 % — HIGH (ref 20–55)
IRON SATN MFR SERPL: 102 % — HIGH (ref 20–55)
IRON SATN MFR SERPL: 269 UG/DL — HIGH (ref 65–170)
IRON SATN MFR SERPL: 269 UG/DL — HIGH (ref 65–170)
LYMPHOCYTES # BLD AUTO: 37 % — SIGNIFICANT CHANGE UP (ref 13–44)
LYMPHOCYTES # BLD AUTO: 37 % — SIGNIFICANT CHANGE UP (ref 13–44)
LYMPHOCYTES # BLD AUTO: 4.1 K/UL — HIGH (ref 1–3.3)
LYMPHOCYTES # BLD AUTO: 4.1 K/UL — HIGH (ref 1–3.3)
MCHC RBC-ENTMCNC: 30.6 PG — SIGNIFICANT CHANGE UP (ref 27–34)
MCHC RBC-ENTMCNC: 30.6 PG — SIGNIFICANT CHANGE UP (ref 27–34)
MCHC RBC-ENTMCNC: 34.5 GM/DL — SIGNIFICANT CHANGE UP (ref 32–36)
MCHC RBC-ENTMCNC: 34.5 GM/DL — SIGNIFICANT CHANGE UP (ref 32–36)
MCV RBC AUTO: 88.7 FL — SIGNIFICANT CHANGE UP (ref 80–100)
MCV RBC AUTO: 88.7 FL — SIGNIFICANT CHANGE UP (ref 80–100)
MONOCYTES # BLD AUTO: 1.84 K/UL — HIGH (ref 0–0.9)
MONOCYTES # BLD AUTO: 1.84 K/UL — HIGH (ref 0–0.9)
MONOCYTES NFR BLD AUTO: 16.6 % — HIGH (ref 2–14)
MONOCYTES NFR BLD AUTO: 16.6 % — HIGH (ref 2–14)
NEUTROPHILS # BLD AUTO: 4.96 K/UL — SIGNIFICANT CHANGE UP (ref 1.8–7.4)
NEUTROPHILS # BLD AUTO: 4.96 K/UL — SIGNIFICANT CHANGE UP (ref 1.8–7.4)
NEUTROPHILS NFR BLD AUTO: 44.6 % — SIGNIFICANT CHANGE UP (ref 43–77)
NEUTROPHILS NFR BLD AUTO: 44.6 % — SIGNIFICANT CHANGE UP (ref 43–77)
NRBC # BLD: 1 /100 WBCS — HIGH (ref 0–0)
NRBC # BLD: 1 /100 WBCS — HIGH (ref 0–0)
PLATELET # BLD AUTO: 306 K/UL — SIGNIFICANT CHANGE UP (ref 150–400)
PLATELET # BLD AUTO: 306 K/UL — SIGNIFICANT CHANGE UP (ref 150–400)
POTASSIUM SERPL-MCNC: 4.4 MMOL/L — SIGNIFICANT CHANGE UP (ref 3.5–5.3)
POTASSIUM SERPL-MCNC: 4.4 MMOL/L — SIGNIFICANT CHANGE UP (ref 3.5–5.3)
POTASSIUM SERPL-SCNC: 4.4 MMOL/L — SIGNIFICANT CHANGE UP (ref 3.5–5.3)
POTASSIUM SERPL-SCNC: 4.4 MMOL/L — SIGNIFICANT CHANGE UP (ref 3.5–5.3)
PROT SERPL-MCNC: 7.8 G/DL — SIGNIFICANT CHANGE UP (ref 6–8.3)
PROT SERPL-MCNC: 7.8 G/DL — SIGNIFICANT CHANGE UP (ref 6–8.3)
PROTHROM AB SERPL-ACNC: 13.1 SEC — HIGH (ref 9.5–13)
PROTHROM AB SERPL-ACNC: 13.1 SEC — HIGH (ref 9.5–13)
RBC # BLD: 1.86 M/UL — LOW (ref 4.2–5.8)
RBC # FLD: 22 % — HIGH (ref 10.3–14.5)
RBC # FLD: 22 % — HIGH (ref 10.3–14.5)
RETICS #: 310.1 K/UL — HIGH (ref 25–125)
RETICS #: 310.1 K/UL — HIGH (ref 25–125)
RETICS/RBC NFR: 16.7 % — HIGH (ref 0.5–2.5)
RETICS/RBC NFR: 16.7 % — HIGH (ref 0.5–2.5)
SODIUM SERPL-SCNC: 133 MMOL/L — LOW (ref 135–145)
SODIUM SERPL-SCNC: 133 MMOL/L — LOW (ref 135–145)
TIBC SERPL-MCNC: 265 UG/DL — SIGNIFICANT CHANGE UP (ref 250–450)
TIBC SERPL-MCNC: 265 UG/DL — SIGNIFICANT CHANGE UP (ref 250–450)
UIBC SERPL-MCNC: -4 UG/DL — LOW (ref 110–370)
UIBC SERPL-MCNC: -4 UG/DL — LOW (ref 110–370)
VIT B12 SERPL-MCNC: 533 PG/ML — SIGNIFICANT CHANGE UP (ref 232–1245)
VIT B12 SERPL-MCNC: 533 PG/ML — SIGNIFICANT CHANGE UP (ref 232–1245)
WBC # BLD: 11.09 K/UL — HIGH (ref 3.8–10.5)
WBC # BLD: 11.09 K/UL — HIGH (ref 3.8–10.5)
WBC # FLD AUTO: 11.09 K/UL — HIGH (ref 3.8–10.5)
WBC # FLD AUTO: 11.09 K/UL — HIGH (ref 3.8–10.5)

## 2024-01-13 PROCEDURE — 99222 1ST HOSP IP/OBS MODERATE 55: CPT | Mod: GC

## 2024-01-13 PROCEDURE — 71046 X-RAY EXAM CHEST 2 VIEWS: CPT | Mod: 26

## 2024-01-13 RX ORDER — LANOLIN ALCOHOL/MO/W.PET/CERES
3 CREAM (GRAM) TOPICAL AT BEDTIME
Refills: 0 | Status: DISCONTINUED | OUTPATIENT
Start: 2024-01-13 | End: 2024-01-15

## 2024-01-13 RX ORDER — DIPHENHYDRAMINE HCL 50 MG
25 CAPSULE ORAL ONCE
Refills: 0 | Status: COMPLETED | OUTPATIENT
Start: 2024-01-13 | End: 2024-01-13

## 2024-01-13 RX ORDER — HYDROMORPHONE HYDROCHLORIDE 2 MG/ML
2 INJECTION INTRAMUSCULAR; INTRAVENOUS; SUBCUTANEOUS
Refills: 0 | Status: DISCONTINUED | OUTPATIENT
Start: 2024-01-13 | End: 2024-01-15

## 2024-01-13 RX ORDER — HYDROMORPHONE HYDROCHLORIDE 2 MG/ML
2 INJECTION INTRAMUSCULAR; INTRAVENOUS; SUBCUTANEOUS ONCE
Refills: 0 | Status: DISCONTINUED | OUTPATIENT
Start: 2024-01-13 | End: 2024-01-13

## 2024-01-13 RX ORDER — ACETAMINOPHEN 500 MG
650 TABLET ORAL ONCE
Refills: 0 | Status: COMPLETED | OUTPATIENT
Start: 2024-01-13 | End: 2024-01-13

## 2024-01-13 RX ORDER — ACETAMINOPHEN 500 MG
650 TABLET ORAL EVERY 6 HOURS
Refills: 0 | Status: DISCONTINUED | OUTPATIENT
Start: 2024-01-13 | End: 2024-01-15

## 2024-01-13 RX ORDER — SODIUM CHLORIDE 9 MG/ML
1000 INJECTION, SOLUTION INTRAVENOUS
Refills: 0 | Status: COMPLETED | OUTPATIENT
Start: 2024-01-13 | End: 2024-01-13

## 2024-01-13 RX ORDER — FOLIC ACID 0.8 MG
1 TABLET ORAL EVERY 24 HOURS
Refills: 0 | Status: DISCONTINUED | OUTPATIENT
Start: 2024-01-13 | End: 2024-01-15

## 2024-01-13 RX ORDER — ONDANSETRON 8 MG/1
4 TABLET, FILM COATED ORAL EVERY 6 HOURS
Refills: 0 | Status: DISCONTINUED | OUTPATIENT
Start: 2024-01-13 | End: 2024-01-15

## 2024-01-13 RX ORDER — HYDROMORPHONE HYDROCHLORIDE 2 MG/ML
1 INJECTION INTRAMUSCULAR; INTRAVENOUS; SUBCUTANEOUS
Refills: 0 | Status: DISCONTINUED | OUTPATIENT
Start: 2024-01-13 | End: 2024-01-15

## 2024-01-13 RX ORDER — ALLOPURINOL 300 MG
100 TABLET ORAL DAILY
Refills: 0 | Status: DISCONTINUED | OUTPATIENT
Start: 2024-01-13 | End: 2024-01-15

## 2024-01-13 RX ADMIN — HYDROMORPHONE HYDROCHLORIDE 2 MILLIGRAM(S): 2 INJECTION INTRAMUSCULAR; INTRAVENOUS; SUBCUTANEOUS at 17:19

## 2024-01-13 RX ADMIN — HYDROMORPHONE HYDROCHLORIDE 2 MILLIGRAM(S): 2 INJECTION INTRAMUSCULAR; INTRAVENOUS; SUBCUTANEOUS at 04:30

## 2024-01-13 RX ADMIN — HYDROMORPHONE HYDROCHLORIDE 1 MILLIGRAM(S): 2 INJECTION INTRAMUSCULAR; INTRAVENOUS; SUBCUTANEOUS at 01:28

## 2024-01-13 RX ADMIN — ONDANSETRON 4 MILLIGRAM(S): 8 TABLET, FILM COATED ORAL at 15:59

## 2024-01-13 RX ADMIN — Medication 100 MILLIGRAM(S): at 11:54

## 2024-01-13 RX ADMIN — Medication 650 MILLIGRAM(S): at 05:30

## 2024-01-13 RX ADMIN — HYDROMORPHONE HYDROCHLORIDE 2 MILLIGRAM(S): 2 INJECTION INTRAMUSCULAR; INTRAVENOUS; SUBCUTANEOUS at 04:28

## 2024-01-13 RX ADMIN — HYDROMORPHONE HYDROCHLORIDE 1 MILLIGRAM(S): 2 INJECTION INTRAMUSCULAR; INTRAVENOUS; SUBCUTANEOUS at 21:20

## 2024-01-13 RX ADMIN — Medication 650 MILLIGRAM(S): at 07:29

## 2024-01-13 RX ADMIN — HYDROMORPHONE HYDROCHLORIDE 2 MILLIGRAM(S): 2 INJECTION INTRAMUSCULAR; INTRAVENOUS; SUBCUTANEOUS at 17:54

## 2024-01-13 RX ADMIN — SODIUM CHLORIDE 70 MILLILITER(S): 9 INJECTION, SOLUTION INTRAVENOUS at 20:23

## 2024-01-13 RX ADMIN — HYDROMORPHONE HYDROCHLORIDE 2 MILLIGRAM(S): 2 INJECTION INTRAMUSCULAR; INTRAVENOUS; SUBCUTANEOUS at 04:14

## 2024-01-13 RX ADMIN — HYDROMORPHONE HYDROCHLORIDE 2 MILLIGRAM(S): 2 INJECTION INTRAMUSCULAR; INTRAVENOUS; SUBCUTANEOUS at 14:32

## 2024-01-13 RX ADMIN — HYDROMORPHONE HYDROCHLORIDE 1 MILLIGRAM(S): 2 INJECTION INTRAMUSCULAR; INTRAVENOUS; SUBCUTANEOUS at 20:23

## 2024-01-13 RX ADMIN — HYDROMORPHONE HYDROCHLORIDE 1 MILLIGRAM(S): 2 INJECTION INTRAMUSCULAR; INTRAVENOUS; SUBCUTANEOUS at 02:18

## 2024-01-13 RX ADMIN — Medication 25 MILLIGRAM(S): at 05:30

## 2024-01-13 RX ADMIN — HYDROMORPHONE HYDROCHLORIDE 1 MILLIGRAM(S): 2 INJECTION INTRAMUSCULAR; INTRAVENOUS; SUBCUTANEOUS at 08:21

## 2024-01-13 RX ADMIN — Medication 1 MILLIGRAM(S): at 11:54

## 2024-01-13 RX ADMIN — HYDROMORPHONE HYDROCHLORIDE 2 MILLIGRAM(S): 2 INJECTION INTRAMUSCULAR; INTRAVENOUS; SUBCUTANEOUS at 02:26

## 2024-01-13 RX ADMIN — Medication 25 MILLIGRAM(S): at 08:50

## 2024-01-13 RX ADMIN — HYDROMORPHONE HYDROCHLORIDE 1 MILLIGRAM(S): 2 INJECTION INTRAMUSCULAR; INTRAVENOUS; SUBCUTANEOUS at 11:52

## 2024-01-13 RX ADMIN — HYDROMORPHONE HYDROCHLORIDE 2 MILLIGRAM(S): 2 INJECTION INTRAMUSCULAR; INTRAVENOUS; SUBCUTANEOUS at 23:40

## 2024-01-13 NOTE — H&P ADULT - ATTENDING COMMENTS
I have personally reviewed the labs and imaging. CXR w/o focal consolidations    43M with PMHx of gout, sickle cell disease (baseline Hb 6, follows with Dr. Jordan) with multiple admissions for pain crisis (last one in Dec 23') p/w anemia Hgb 5 after routine monthly visit to sickle cell MD yesterday now p/w bilateral back and leg pains starting yesterday concerning for sickle cell crisis.     #Sickle cell crisis  #Anemia  #Gout    -IV Dilaudid PRN severe pain, similar dose to prior admission  -IVF  -Send retic count w/ next cbc  -Trend cbc  -Cont. allopurinol, still on colchicine?  -Outpatient follow up from transaminitis worked up previously, iron overload from transfusions?  -DVT PPx, Lovenox

## 2024-01-13 NOTE — H&P ADULT - NSHPREVIEWOFSYSTEMS_GEN_ALL_CORE
CONSTITUTIONAL: No fever, weight loss, or fatigue  RESPIRATORY: No cough, wheezing, chills or hemoptysis; No shortness of breath  CARDIOVASCULAR: No chest pain, palpitations, dizziness, or leg swelling  GASTROINTESTINAL: No abdominal pain. No nausea, vomiting, or hematemesis; No diarrhea or constipation. No melena or hematochezia.  GENITOURINARY: No dysuria or hematuria, urinary frequency  NEUROLOGICAL: No headaches, memory loss, loss of strength, numbness, or tremors  ENDOCRINE: No polyuria, polydipsia, or heat/cold intolerance  MUSCULOSKELETAL: + joint pains  HEME: no easy bruisability, no tender or enlarged lymph nodes  SKIN: No itching, burning, rashes, or lesions .

## 2024-01-13 NOTE — H&P ADULT - ASSESSMENT
44 y/o M with PMHx of gout, sickle cell disease (baseline Hb 6, follows with Dr. Jordan) with multiple admissions for pain crisis (last one in Dec 23') who presents with back pain. Admitted for sickle cell crisis.

## 2024-01-13 NOTE — ED ADULT NURSE NOTE - NSFALLUNIVINTERV_ED_ALL_ED
Bed/Stretcher in lowest position, wheels locked, appropriate side rails in place/Call bell, personal items and telephone in reach/Instruct patient to call for assistance before getting out of bed/chair/stretcher/Non-slip footwear applied when patient is off stretcher/[place] to call system/Physically safe environment - no spills, clutter or unnecessary equipment/Purposeful proactive rounding/Room/bathroom lighting operational, light cord in reach Bed/Stretcher in lowest position, wheels locked, appropriate side rails in place/Call bell, personal items and telephone in reach/Instruct patient to call for assistance before getting out of bed/chair/stretcher/Non-slip footwear applied when patient is off stretcher/[place] to call system/Physically safe environment - no spills, clutter or unnecessary equipment/Purposeful proactive rounding/Room/bathroom lighting operational, light cord in reach

## 2024-01-13 NOTE — H&P ADULT - PROBLEM SELECTOR PLAN 1
Pt has a history of sickle cell disease with anemia  Hgb 5.7 on admission  2uPRBC ordered in ED  CXR clear, no sings or symptoms of acute chest syndrome  - F/u post transfusion CBC  - transfuse if Hgb < 7  - C/w Dilaudid 2mg q3 hours for severe pain  - C/w Dilaudid 1mg q3 hours for moderate pain    Consider Heme-onc consult in AM  Consider Pain management consult in AM if not controlled Pt has a history of sickle cell disease with anemia  Hgb 5.7 on admission  2uPRBC ordered in ED  CXR clear, no sings or symptoms of acute chest syndrome  - F/u post transfusion CBC  - transfuse if Hgb < 7  - C/w Dilaudid 2mg q3 hours for severe pain  - C/w Dilaudid 1mg q3 hours for moderate pain  Consider Pain management consult in AM if not controlled

## 2024-01-13 NOTE — H&P ADULT - PROBLEM SELECTOR PLAN 3
Abdominal US from Dec 23' : Hepatomegaly with increased echogenicity suggesting underlying diffuse liver disease unchanged to prior study  Likely 2/2 liver disease 2/2 Sickle cell disease  - Continue to monitor Abdominal US from Dec 23' : Hepatomegaly with increased echogenicity suggesting underlying diffuse liver disease unchanged to prior study  Possible liver disease 2/2 iron overload from transfusions?  - Continue to monitor

## 2024-01-13 NOTE — H&P ADULT - HISTORY OF PRESENT ILLNESS
This is a 42 y/o M with PMHx of gout, sickle cell disease (baseline Hb 6, follows with Dr. Jordan) with multiple admissions for pain crisis (last one in Dec 23') who presents with back pain. Pain started yesterday afternoon and has started in his knees and ribs as well. pain is 10/10 sharp in nature. Reports that the pain is similar to his prior sickle cell crises. Pt endorses some chills and nausea, along with some dizziness. Pt states his crisis are triggered by low blood levels usually. Pt mentions he has leg swelling for the past few months as well. Pt denies any recent illnesses, sick contacts, travel, fever, SOB, CP, headaches, vomiting, diarrhea, constipation, numbness or tingling.

## 2024-01-13 NOTE — H&P ADULT - NSHPPHYSICALEXAM_GEN_ALL_CORE
GENERAL: NAD, regular build  HEAD:  Atraumatic, Normocephalic  EYES: EOMI, PERRLA, conjunctiva and scleral icterus  NECK: Supple  CHEST/LUNG: Clear to auscultation bilaterally, no RRW  HEART: Regular rate and rhythm; No murmurs, rubs, or gallops  ABDOMEN: Soft, Nontender, Nondistended; Bowel sounds present  EXTREMITIES:  2+ Peripheral Pulses, 2+ pitting edema B/L LE  PSYCH: AAOx3  NEUROLOGY: non-focal  SKIN: Jaundiced

## 2024-01-13 NOTE — ED ADULT NURSE NOTE - OBJECTIVE STATEMENT
the patient  is a 43 y male complaining  of abnormal labs , h/o sickle cell crisis . H/H is low  for blood transfusion

## 2024-01-14 ENCOUNTER — TRANSCRIPTION ENCOUNTER (OUTPATIENT)
Age: 44
End: 2024-01-14

## 2024-01-14 LAB
ANION GAP SERPL CALC-SCNC: 5 MMOL/L — SIGNIFICANT CHANGE UP (ref 5–17)
ANION GAP SERPL CALC-SCNC: 5 MMOL/L — SIGNIFICANT CHANGE UP (ref 5–17)
BUN SERPL-MCNC: 19 MG/DL — HIGH (ref 7–18)
BUN SERPL-MCNC: 19 MG/DL — HIGH (ref 7–18)
CALCIUM SERPL-MCNC: 8.8 MG/DL — SIGNIFICANT CHANGE UP (ref 8.4–10.5)
CALCIUM SERPL-MCNC: 8.8 MG/DL — SIGNIFICANT CHANGE UP (ref 8.4–10.5)
CHLORIDE SERPL-SCNC: 108 MMOL/L — SIGNIFICANT CHANGE UP (ref 96–108)
CHLORIDE SERPL-SCNC: 108 MMOL/L — SIGNIFICANT CHANGE UP (ref 96–108)
CO2 SERPL-SCNC: 24 MMOL/L — SIGNIFICANT CHANGE UP (ref 22–31)
CO2 SERPL-SCNC: 24 MMOL/L — SIGNIFICANT CHANGE UP (ref 22–31)
CREAT SERPL-MCNC: 0.95 MG/DL — SIGNIFICANT CHANGE UP (ref 0.5–1.3)
CREAT SERPL-MCNC: 0.95 MG/DL — SIGNIFICANT CHANGE UP (ref 0.5–1.3)
EGFR: 101 ML/MIN/1.73M2 — SIGNIFICANT CHANGE UP
EGFR: 101 ML/MIN/1.73M2 — SIGNIFICANT CHANGE UP
GLUCOSE SERPL-MCNC: 123 MG/DL — HIGH (ref 70–99)
GLUCOSE SERPL-MCNC: 123 MG/DL — HIGH (ref 70–99)
HCT VFR BLD CALC: 21.5 % — LOW (ref 39–50)
HCT VFR BLD CALC: 21.5 % — LOW (ref 39–50)
HGB BLD-MCNC: 7.5 G/DL — LOW (ref 13–17)
HGB BLD-MCNC: 7.5 G/DL — LOW (ref 13–17)
MAGNESIUM SERPL-MCNC: 1.6 MG/DL — SIGNIFICANT CHANGE UP (ref 1.6–2.6)
MAGNESIUM SERPL-MCNC: 1.6 MG/DL — SIGNIFICANT CHANGE UP (ref 1.6–2.6)
MCHC RBC-ENTMCNC: 31.1 PG — SIGNIFICANT CHANGE UP (ref 27–34)
MCHC RBC-ENTMCNC: 31.1 PG — SIGNIFICANT CHANGE UP (ref 27–34)
MCHC RBC-ENTMCNC: 34.9 GM/DL — SIGNIFICANT CHANGE UP (ref 32–36)
MCHC RBC-ENTMCNC: 34.9 GM/DL — SIGNIFICANT CHANGE UP (ref 32–36)
MCV RBC AUTO: 89.2 FL — SIGNIFICANT CHANGE UP (ref 80–100)
MCV RBC AUTO: 89.2 FL — SIGNIFICANT CHANGE UP (ref 80–100)
NRBC # BLD: 1 /100 WBCS — HIGH (ref 0–0)
NRBC # BLD: 1 /100 WBCS — HIGH (ref 0–0)
PHOSPHATE SERPL-MCNC: 3.2 MG/DL — SIGNIFICANT CHANGE UP (ref 2.5–4.5)
PHOSPHATE SERPL-MCNC: 3.2 MG/DL — SIGNIFICANT CHANGE UP (ref 2.5–4.5)
PLATELET # BLD AUTO: 258 K/UL — SIGNIFICANT CHANGE UP (ref 150–400)
PLATELET # BLD AUTO: 258 K/UL — SIGNIFICANT CHANGE UP (ref 150–400)
POTASSIUM SERPL-MCNC: 3.8 MMOL/L — SIGNIFICANT CHANGE UP (ref 3.5–5.3)
POTASSIUM SERPL-MCNC: 3.8 MMOL/L — SIGNIFICANT CHANGE UP (ref 3.5–5.3)
POTASSIUM SERPL-SCNC: 3.8 MMOL/L — SIGNIFICANT CHANGE UP (ref 3.5–5.3)
POTASSIUM SERPL-SCNC: 3.8 MMOL/L — SIGNIFICANT CHANGE UP (ref 3.5–5.3)
RBC # BLD: 2.41 M/UL — LOW (ref 4.2–5.8)
RBC # BLD: 2.41 M/UL — LOW (ref 4.2–5.8)
RBC # FLD: 20.5 % — HIGH (ref 10.3–14.5)
RBC # FLD: 20.5 % — HIGH (ref 10.3–14.5)
SODIUM SERPL-SCNC: 137 MMOL/L — SIGNIFICANT CHANGE UP (ref 135–145)
SODIUM SERPL-SCNC: 137 MMOL/L — SIGNIFICANT CHANGE UP (ref 135–145)
WBC # BLD: 7.77 K/UL — SIGNIFICANT CHANGE UP (ref 3.8–10.5)
WBC # BLD: 7.77 K/UL — SIGNIFICANT CHANGE UP (ref 3.8–10.5)
WBC # FLD AUTO: 7.77 K/UL — SIGNIFICANT CHANGE UP (ref 3.8–10.5)
WBC # FLD AUTO: 7.77 K/UL — SIGNIFICANT CHANGE UP (ref 3.8–10.5)

## 2024-01-14 PROCEDURE — 99233 SBSQ HOSP IP/OBS HIGH 50: CPT | Mod: GC

## 2024-01-14 RX ORDER — ENOXAPARIN SODIUM 100 MG/ML
40 INJECTION SUBCUTANEOUS EVERY 24 HOURS
Refills: 0 | Status: DISCONTINUED | OUTPATIENT
Start: 2024-01-14 | End: 2024-01-15

## 2024-01-14 RX ORDER — SODIUM CHLORIDE 9 MG/ML
1000 INJECTION, SOLUTION INTRAVENOUS
Refills: 0 | Status: DISCONTINUED | OUTPATIENT
Start: 2024-01-14 | End: 2024-01-15

## 2024-01-14 RX ADMIN — HYDROMORPHONE HYDROCHLORIDE 2 MILLIGRAM(S): 2 INJECTION INTRAMUSCULAR; INTRAVENOUS; SUBCUTANEOUS at 07:54

## 2024-01-14 RX ADMIN — HYDROMORPHONE HYDROCHLORIDE 1 MILLIGRAM(S): 2 INJECTION INTRAMUSCULAR; INTRAVENOUS; SUBCUTANEOUS at 03:54

## 2024-01-14 RX ADMIN — Medication 1 MILLIGRAM(S): at 11:26

## 2024-01-14 RX ADMIN — HYDROMORPHONE HYDROCHLORIDE 2 MILLIGRAM(S): 2 INJECTION INTRAMUSCULAR; INTRAVENOUS; SUBCUTANEOUS at 13:55

## 2024-01-14 RX ADMIN — HYDROMORPHONE HYDROCHLORIDE 1 MILLIGRAM(S): 2 INJECTION INTRAMUSCULAR; INTRAVENOUS; SUBCUTANEOUS at 17:55

## 2024-01-14 RX ADMIN — HYDROMORPHONE HYDROCHLORIDE 2 MILLIGRAM(S): 2 INJECTION INTRAMUSCULAR; INTRAVENOUS; SUBCUTANEOUS at 12:56

## 2024-01-14 RX ADMIN — HYDROMORPHONE HYDROCHLORIDE 1 MILLIGRAM(S): 2 INJECTION INTRAMUSCULAR; INTRAVENOUS; SUBCUTANEOUS at 10:35

## 2024-01-14 RX ADMIN — HYDROMORPHONE HYDROCHLORIDE 1 MILLIGRAM(S): 2 INJECTION INTRAMUSCULAR; INTRAVENOUS; SUBCUTANEOUS at 20:59

## 2024-01-14 RX ADMIN — Medication 100 MILLIGRAM(S): at 11:27

## 2024-01-14 RX ADMIN — SODIUM CHLORIDE 70 MILLILITER(S): 9 INJECTION, SOLUTION INTRAVENOUS at 09:04

## 2024-01-14 RX ADMIN — HYDROMORPHONE HYDROCHLORIDE 1 MILLIGRAM(S): 2 INJECTION INTRAMUSCULAR; INTRAVENOUS; SUBCUTANEOUS at 21:40

## 2024-01-14 RX ADMIN — HYDROMORPHONE HYDROCHLORIDE 1 MILLIGRAM(S): 2 INJECTION INTRAMUSCULAR; INTRAVENOUS; SUBCUTANEOUS at 09:36

## 2024-01-14 RX ADMIN — HYDROMORPHONE HYDROCHLORIDE 1 MILLIGRAM(S): 2 INJECTION INTRAMUSCULAR; INTRAVENOUS; SUBCUTANEOUS at 16:56

## 2024-01-14 RX ADMIN — HYDROMORPHONE HYDROCHLORIDE 1 MILLIGRAM(S): 2 INJECTION INTRAMUSCULAR; INTRAVENOUS; SUBCUTANEOUS at 03:01

## 2024-01-14 RX ADMIN — HYDROMORPHONE HYDROCHLORIDE 2 MILLIGRAM(S): 2 INJECTION INTRAMUSCULAR; INTRAVENOUS; SUBCUTANEOUS at 06:18

## 2024-01-14 RX ADMIN — HYDROMORPHONE HYDROCHLORIDE 2 MILLIGRAM(S): 2 INJECTION INTRAMUSCULAR; INTRAVENOUS; SUBCUTANEOUS at 00:13

## 2024-01-14 RX ADMIN — ENOXAPARIN SODIUM 40 MILLIGRAM(S): 100 INJECTION SUBCUTANEOUS at 17:41

## 2024-01-14 NOTE — PROGRESS NOTE ADULT - ATTENDING COMMENTS
Patient seen at bedside, states his pain in the ribs and back is unchanged, still requires frequent PRN pain meds.   On exam, patient is AOx3, NAD on my interview, cardiopulmonary exams unremarkable, abdomen soft, NT/ND, extremities without edema.  Labs reviewed, CBC with hgb 7.5, BMP with Cr 0.95.    Assessment and plan:  43yM with PMH of gout and sickle cell anemia, with multiple admissions for sickle cell crisis, who presented with worsening rib and back pain, admitted for sick cell crisis and pain management.    #Acute pain 2/2 sickle cell crisis  #MATA, resolved  #Gout on allopurinol  #DVT ppx  #Hx of allergy to CTX, pip/tazo, and PCN  - patient has hx of multiple admissions, has baseline hgb 5.0s. No signs of ACS. Would avoid transfusion unless the level is below 5.0 to avoid iron overload  - continue pain management with dilaudid 2mg q3h, which is the dose recommended by pain medicine previously  - continue IVF  - daily CBC, BMP, LFT  - DVT ppx: lovenox

## 2024-01-14 NOTE — DISCHARGE NOTE PROVIDER - NSDCCPCAREPLAN_GEN_ALL_CORE_FT
PRINCIPAL DISCHARGE DIAGNOSIS  Diagnosis: Sickle cell anemia with crisis  Assessment and Plan of Treatment: You have known history of sickle cell anemia, you presented to the ED with pain in your back, knee and ribs. You were found to have a low blood count, hemoglobin was low at 5. You were transfused 2 units of blood, which elevated your hemoglobin to 7. Since you complained of pain in the ribs you had a chest ray done which showed no features of acute chest syndrome. You were started on IV fluids to keep you hydrated and an adaquet pain regimen.   Please continue taking your medications as prescribed. Please follow up with your hematologist Dr. Jordan and PCP within 1-2 weeks of being discharge.      SECONDARY DISCHARGE DIAGNOSES  Diagnosis: Gout  Assessment and Plan of Treatment: You have known history of gout. You take XXXXXallopurinol and colchicineXXX. You did not experience an acute flare up. Please continue taking your medications as prescribed.    Diagnosis: Transaminitis  Assessment and Plan of Treatment: On admission your labs showed elevated liver enzymes (AST and ALT) on a prior admission in december 2023, an ultrasound of the abdomen revealed diffuse liver injury which was seen on prior studies. This is likely due to your history of sickle cell anemia which required you to receive multiple transfusions of blood. The transfusions of blood contain iron and it is possible the iron is being deposited in your liver as a consequence of transfusions.     PRINCIPAL DISCHARGE DIAGNOSIS  Diagnosis: Sickle cell anemia with crisis  Assessment and Plan of Treatment: You have known history of sickle cell anemia, you presented to the ED with pain in your back, knee and ribs. You were found to have a low blood count, hemoglobin was low at 5. You were transfused 2 units of blood, which elevated your hemoglobin to 7. Since you complained of pain in the ribs you had a chest ray done which showed no features of acute chest syndrome. You were started on IV fluids to keep you hydrated and an adaquet pain regimen.   Please continue taking your medications as prescribed. Please follow up with your hematologist Dr. Jordan and PCP within 1-2 weeks of being discharge.      SECONDARY DISCHARGE DIAGNOSES  Diagnosis: Gout  Assessment and Plan of Treatment: You have known history of gout. You take allopurinol. You did not experience an acute flare up. Please continue taking your medications as prescribed.    Diagnosis: Transaminitis  Assessment and Plan of Treatment: On admission your labs showed elevated liver enzymes (AST and ALT) on a prior admission in december 2023, an ultrasound of the abdomen revealed diffuse liver injury which was seen on prior studies. This is likely due to your history of sickle cell anemia which required you to receive multiple transfusions of blood. The transfusions of blood contain iron and it is possible the iron is being deposited in your liver as a consequence of transfusions.

## 2024-01-14 NOTE — PROGRESS NOTE ADULT - PROBLEM SELECTOR PLAN 1
Pt has a history of sickle cell disease with anemia  Hgb 5.7 on admission  2uPRBC ordered in ED  CXR clear, no sings or symptoms of acute chest syndrome  - F/u post transfusion CBC  - transfuse if Hgb < 7  - C/w Dilaudid 2mg q3 hours for severe pain  - C/w Dilaudid 1mg q3 hours for moderate pain  Consider Pain management consult in AM if not controlled Pt has a history of sickle cell disease with anemia  Hgb 5.7 on admission. s/p 2uPRBC ordered in ED  CXR clear, no sings or symptoms of acute chest syndrome  F/u post transfusion CBC  transfuse if Hgb < 5  C/w Dilaudid 2mg q3 hours for severe pain  C/w Dilaudid 1mg q3 hours for moderate pain  c/w maintance IVF to prevent dehydration

## 2024-01-14 NOTE — DISCHARGE NOTE PROVIDER - PROVIDER TOKENS
PROVIDER:[TOKEN:[[id number]]] PROVIDER:[TOKEN:[[id number]]] PROVIDER:[TOKEN:[[id number]]]

## 2024-01-14 NOTE — DISCHARGE NOTE PROVIDER - CARE PROVIDER_API CALL
Julian [facility]  Medical Oncology  [facility] [address] [place] [address]  Phone: [phone]  Fax: [phone]  Follow Up Time:    Julian [facility]  Medical Oncology  [facility] [address] [place] [address]  Phone: [phone]  Fax: [phone]  Follow Up Time:    Julian [facility]  Medical Oncology  [facility] [address] [place] [address]  Phone: [phone]  Fax: [phone]  Follow Up Time:

## 2024-01-14 NOTE — PROGRESS NOTE ADULT - PROBLEM SELECTOR PLAN 3
Abdominal US from Dec 23' : Hepatomegaly with increased echogenicity suggesting underlying diffuse liver disease unchanged to prior study  Possible liver disease 2/2 iron overload from transfusions?  - Continue to monitor Abdominal US from Dec 23' : Hepatomegaly with increased echogenicity suggesting underlying diffuse liver disease unchanged to prior study  Possible liver disease 2/2 iron overload from transfusions?  Continue to monitor

## 2024-01-14 NOTE — PROGRESS NOTE ADULT - ASSESSMENT
42 y/o M with PMHx of gout, sickle cell disease (baseline Hb 6, follows with Dr. Jordan) with multiple admissions for pain crisis (last one in Dec 23') who presents with back pain. Admitted for sickle cell crisis. 44 y/o M with PMHx of gout, sickle cell disease (baseline Hb 6, follows with Dr. Jordan) with multiple admissions for pain crisis (last one in Dec 23') who presents with back pain. Admitted for sickle cell crisis. 44 y/o M with PMHx of gout, sickle cell disease (baseline Hb 6, follows with Dr. Jordan) with multiple admissions for pain crisis (last one in Dec 23') who presents with back pain. Admitted for sickle cell crisis s/p 2 prbc in ED (1/13). Patient's baseline Hb is 5, his threshold for transfusion is Hb<5.

## 2024-01-14 NOTE — DISCHARGE NOTE PROVIDER - NSDCMRMEDTOKEN_GEN_ALL_CORE_FT
allopurinol 100 mg oral tablet: 1 tab(s) orally once a day  folic acid 1 mg oral tablet: 1 tab(s) orally every 24 hours  oxyCODONE 30 mg oral tablet: 1 tab(s) orally every 4 hours as needed for  severe pain   acetaminophen 325 mg oral tablet: 2 tab(s) orally every 6 hours As needed Temp greater or equal to 38C (100.4F), Mild Pain (1 - 3)  allopurinol 100 mg oral tablet: 1 tab(s) orally once a day  folic acid 1 mg oral tablet: 1 tab(s) orally every 24 hours  oxyCODONE 30 mg oral tablet: 1 tab(s) orally every 4 hours as needed for  severe pain   acetaminophen 325 mg oral tablet: 2 tab(s) orally every 6 hours As needed Temp greater or equal to 38C (100.4F), Mild Pain (1 - 3)  allopurinol 100 mg oral tablet: 1 tab(s) orally once a day  folic acid 1 mg oral tablet: 1 tab(s) orally every 24 hours  omeprazole 20 mg oral delayed release capsule: 1 cap(s) orally once a day  oxyCODONE 30 mg oral tablet: 1 tab(s) orally every 4 hours as needed for  severe pain

## 2024-01-14 NOTE — PROGRESS NOTE ADULT - PROBLEM SELECTOR PLAN 2
Pt has a history of Gout, takes allopurinol and colchicine at home, unsure of doses  - F/u Med rec  - C/w Allopurinol 100mg daily Pt has a history of Gout, takes allopurinol and colchicine at home, unsure of doses  C/w Allopurinol 100mg daily

## 2024-01-14 NOTE — PROGRESS NOTE ADULT - SUBJECTIVE AND OBJECTIVE BOX
PGY-1 Progress Note discussed with attending    PAGER #: [[phone]] TILL 5:00 PM  PLEASE CONTACT ON CALL TEAM:  - On Call Team (Please refer to Rubi) FROM 5:00 PM - 8:30PM  - Nightfloat Team FROM 8:30 -7:30 AM    CHIEF COMPLAINT & BRIEF HOSPITAL COURSE:    INTERVAL HPI/OVERNIGHT EVENTS:   MEDICATIONS  (STANDING):  allopurinol 100 milliGRAM(s) Oral daily  folic acid 1 milliGRAM(s) Oral every 24 hours    MEDICATIONS  (PRN):  acetaminophen     Tablet .. 650 milliGRAM(s) Oral every 6 hours PRN Temp greater or equal to 38C (100.4F), Mild Pain (1 - 3)  HYDROmorphone  Injectable 1 milliGRAM(s) IV Push every 3 hours PRN Moderate Pain (4 - 6)  HYDROmorphone  Injectable 2 milliGRAM(s) IV Push every 3 hours PRN Severe Pain (7 - 10)  melatonin 3 milliGRAM(s) Oral at bedtime PRN Insomnia  ondansetron Injectable 4 milliGRAM(s) IV Push every 6 hours PRN Nausea and/or Vomiting      REVIEW OF SYSTEMS:  CONSTITUTIONAL: No fever, weight loss, or fatigue  RESPIRATORY: No shortness of breath  CARDIOVASCULAR: No chest pain  GASTROINTESTINAL: No abdominal pain.  GENITOURINARY: No dysuria  NEUROLOGICAL: No headaches  SKIN: No itching, burning, rashes    Vital Signs Last 24 Hrs  T(C): 36.8 (14 Jan 2024 05:00), Max: 37 (13 Jan 2024 15:31)  T(F): 98.2 (14 Jan 2024 05:00), Max: 98.6 (13 Jan 2024 15:31)  HR: 93 (14 Jan 2024 05:00) (65 - 93)  BP: 153/84 (14 Jan 2024 05:00) (138/78 - 167/91)  BP(mean): 108 (13 Jan 2024 11:15) (98 - 108)  RR: 18 (14 Jan 2024 05:00) (18 - 18)  SpO2: 93% (14 Jan 2024 05:00) (93% - 97%)    Parameters below as of 14 Jan 2024 05:00  Patient On (Oxygen Delivery Method): room air        PHYSICAL EXAMINATION:  GENERAL: NAD, well built  HEAD:  Atraumatic, Normocephalic  EYES:  conjunctiva and sclera clear  CHEST/LUNG: Clear to auscultation. No rales, rhonchi, wheezing, or rubs  HEART: Regular rate and rhythm; No murmurs, rubs, or gallops  ABDOMEN: Soft, Nontender, Nondistended; Bowel sounds present  NERVOUS SYSTEM:  Alert & Oriented X3,    EXTREMITIES:  2+ Peripheral Pulses, No clubbing, cyanosis, or edema  SKIN: warm dry                          5.7    11.09 )-----------( 306      ( 13 Jan 2024 00:50 )             16.5     01-13    133<L>  |  105  |  25<H>  ----------------------------<  114<H>  4.4   |  23  |  1.29    Ca    8.8      13 Jan 2024 00:50    TPro  7.8  /  Alb  3.6  /  TBili  7.8<H>  /  DBili  3.5<H>  /  AST  200<H>  /  ALT  117<H>  /  AlkPhos  165<H>  01-13    LIVER FUNCTIONS - ( 13 Jan 2024 00:50 )  Alb: 3.6 g/dL / Pro: 7.8 g/dL / ALK PHOS: 165 U/L / ALT: 117 U/L DA / AST: 200 U/L / GGT: x               PT/INR - ( 13 Jan 2024 00:50 )   PT: 13.1 sec;   INR: 1.15 ratio         PTT - ( 13 Jan 2024 00:50 )  PTT:31.1 sec    CAPILLARY BLOOD GLUCOSE      RADIOLOGY & ADDITIONAL TESTS:                   PGY-1 Progress Note discussed with attending    PAGER #: [[phone]] TILL 5:00 PM  PLEASE CONTACT ON CALL TEAM:  - On Call Team (Please refer to Rubi) FROM 5:00 PM - 8:30PM  - Nightfloat Team FROM 8:30 -7:30 AM    CHIEF COMPLAINT & BRIEF HOSPITAL COURSE:    INTERVAL HPI/OVERNIGHT EVENTS:   MEDICATIONS  (STANDING):  allopurinol 100 milliGRAM(s) Oral daily  folic acid 1 milliGRAM(s) Oral every 24 hours    MEDICATIONS  (PRN):  acetaminophen     Tablet .. 650 milliGRAM(s) Oral every 6 hours PRN Temp greater or equal to 38C (100.4F), Mild Pain (1 - 3)  HYDROmorphone  Injectable 1 milliGRAM(s) IV Push every 3 hours PRN Moderate Pain (4 - 6)  HYDROmorphone  Injectable 2 milliGRAM(s) IV Push every 3 hours PRN Severe Pain (7 - 10)  melatonin 3 milliGRAM(s) Oral at bedtime PRN Insomnia  ondansetron Injectable 4 milliGRAM(s) IV Push every 6 hours PRN Nausea and/or Vomiting      REVIEW OF SYSTEMS:  CONSTITUTIONAL: No fever, weight loss, or fatigue  RESPIRATORY: No shortness of breath  CARDIOVASCULAR: No chest pain  GASTROINTESTINAL: No abdominal pain.  GENITOURINARY: No dysuria  NEUROLOGICAL: No headaches  SKIN: No itching, burning, rashes    Vital Signs Last 24 Hrs  T(C): 36.8 (14 Jan 2024 05:00), Max: 37 (13 Jan 2024 15:31)  T(F): 98.2 (14 Jan 2024 05:00), Max: 98.6 (13 Jan 2024 15:31)  HR: 93 (14 Jan 2024 05:00) (65 - 93)  BP: 153/84 (14 Jan 2024 05:00) (138/78 - 167/91)  BP(mean): 108 (13 Jan 2024 11:15) (98 - 108)  RR: 18 (14 Jan 2024 05:00) (18 - 18)  SpO2: 93% (14 Jan 2024 05:00) (93% - 97%)    Parameters below as of 14 Jan 2024 05:00  Patient On (Oxygen Delivery Method): room air        PHYSICAL EXAMINATION:  GENERAL: NAD, well built  HEAD:  Atraumatic, Normocephalic  EYES:  conjunctiva and sclera clear  CHEST/LUNG: Clear to auscultation. No rales, rhonchi, wheezing, or rubs  HEART: Regular rate and rhythm; No murmurs, rubs, or gallops  ABDOMEN: Soft, Nontender, Nondistended; Bowel sounds present  NERVOUS SYSTEM:  Alert & Oriented X3,    EXTREMITIES:  2+ Peripheral Pulses, No clubbing, cyanosis, or edema  SKIN: warm dry                          5.7    11.09 )-----------( 306      ( 13 Jan 2024 00:50 )             16.5     01-13    133<L>  |  105  |  25<H>  ----------------------------<  114<H>  4.4   |  23  |  1.29    Ca    8.8      13 Jan 2024 00:50    TPro  7.8  /  Alb  3.6  /  TBili  7.8<H>  /  DBili  3.5<H>  /  AST  200<H>  /  ALT  117<H>  /  AlkPhos  165<H>  01-13    LIVER FUNCTIONS - ( 13 Jan 2024 00:50 )  Alb: 3.6 g/dL / Pro: 7.8 g/dL / ALK PHOS: 165 U/L / ALT: 117 U/L DA / AST: 200 U/L / GGT: x               PT/INR - ( 13 Jan 2024 00:50 )   PT: 13.1 sec;   INR: 1.15 ratio         PTT - ( 13 Jan 2024 00:50 )  PTT:31.1 sec    CAPILLARY BLOOD GLUCOSE      RADIOLOGY & ADDITIONAL TESTS:                   PGY-1 Progress Note discussed with attending    PAGER #: [[phone]] TILL 5:00 PM  PLEASE CONTACT ON CALL TEAM:  - On Call Team (Please refer to Rubi) FROM 5:00 PM - 8:30PM  - Nightfloat Team FROM 8:30 -7:30 AM      INTERVAL HPI/OVERNIGHT EVENTS: No acute events overnight.  Patient examined at bedside this AM.  Patient c/o of severe (8/10) sharp pain in ribs and back. No sob, palpitations, headahce, dizziness.     MEDICATIONS  (STANDING):  allopurinol 100 milliGRAM(s) Oral daily  folic acid 1 milliGRAM(s) Oral every 24 hours    MEDICATIONS  (PRN):  acetaminophen     Tablet .. 650 milliGRAM(s) Oral every 6 hours PRN Temp greater or equal to 38C (100.4F), Mild Pain (1 - 3)  HYDROmorphone  Injectable 1 milliGRAM(s) IV Push every 3 hours PRN Moderate Pain (4 - 6)  HYDROmorphone  Injectable 2 milliGRAM(s) IV Push every 3 hours PRN Severe Pain (7 - 10)  melatonin 3 milliGRAM(s) Oral at bedtime PRN Insomnia  ondansetron Injectable 4 milliGRAM(s) IV Push every 6 hours PRN Nausea and/or Vomiting      REVIEW OF SYSTEMS:  CONSTITUTIONAL: No fever, weight loss, or fatigue  RESPIRATORY: No shortness of breath  CARDIOVASCULAR:  pain in ribs   GASTROINTESTINAL: No abdominal pain.  GENITOURINARY: No dysuria  NEUROLOGICAL: No headaches  SKIN: No itching, burning, rashes  Ext: pain in back     Vital Signs Last 24 Hrs  T(C): 36.8 (14 Jan 2024 05:00), Max: 37 (13 Jan 2024 15:31)  T(F): 98.2 (14 Jan 2024 05:00), Max: 98.6 (13 Jan 2024 15:31)  HR: 93 (14 Jan 2024 05:00) (65 - 93)  BP: 153/84 (14 Jan 2024 05:00) (138/78 - 167/91)  BP(mean): 108 (13 Jan 2024 11:15) (98 - 108)  RR: 18 (14 Jan 2024 05:00) (18 - 18)  SpO2: 93% (14 Jan 2024 05:00) (93% - 97%)    Parameters below as of 14 Jan 2024 05:00  Patient On (Oxygen Delivery Method): room air        PHYSICAL EXAMINATION:  GENERAL: NAD, well built  HEAD:  Atraumatic, Normocephalic  EYES:  conjunctiva and sclera clear  CHEST/LUNG: Clear to auscultation. No rales, rhonchi, wheezing, or rubs  HEART: Regular rate and rhythm; No murmurs, rubs, or gallops  ABDOMEN: Soft, Nontender, Nondistended; Bowel sounds present  NERVOUS SYSTEM:  Alert & Oriented X3,    EXTREMITIES:  2+ Peripheral Pulses, No clubbing, cyanosis, or edema.   SKIN: warm dry                          5.7    11.09 )-----------( 306      ( 13 Jan 2024 00:50 )             16.5     01-13    133<L>  |  105  |  25<H>  ----------------------------<  114<H>  4.4   |  23  |  1.29    Ca    8.8      13 Jan 2024 00:50    TPro  7.8  /  Alb  3.6  /  TBili  7.8<H>  /  DBili  3.5<H>  /  AST  200<H>  /  ALT  117<H>  /  AlkPhos  165<H>  01-13    LIVER FUNCTIONS - ( 13 Jan 2024 00:50 )  Alb: 3.6 g/dL / Pro: 7.8 g/dL / ALK PHOS: 165 U/L / ALT: 117 U/L DA / AST: 200 U/L / GGT: x               PT/INR - ( 13 Jan 2024 00:50 )   PT: 13.1 sec;   INR: 1.15 ratio         PTT - ( 13 Jan 2024 00:50 )  PTT:31.1 sec    CAPILLARY BLOOD GLUCOSE      RADIOLOGY & ADDITIONAL TESTS:                   PGY-1 Progress Note discussed with attending    PAGER #: [[phone]] TILL 5:00 PM  PLEASE CONTACT ON CALL TEAM:  - On Call Team (Please refer to Rubi) FROM 5:00 PM - 8:30PM  - Nightfloat Team FROM 8:30 -7:30 AM      INTERVAL HPI/OVERNIGHT EVENTS: No acute events overnight.  Patient examined at bedside this AM.  Patient c/o of severe (8/10) sharp pain in ribs and back. No sob, palpitations, headahce, dizziness.     MEDICATIONS  (STANDING):  allopurinol 100 milliGRAM(s) Oral daily  folic acid 1 milliGRAM(s) Oral every 24 hours    MEDICATIONS  (PRN):  acetaminophen     Tablet .. 650 milliGRAM(s) Oral every 6 hours PRN Temp greater or equal to 38C (100.4F), Mild Pain (1 - 3)  HYDROmorphone  Injectable 1 milliGRAM(s) IV Push every 3 hours PRN Moderate Pain (4 - 6)  HYDROmorphone  Injectable 2 milliGRAM(s) IV Push every 3 hours PRN Severe Pain (7 - 10)  melatonin 3 milliGRAM(s) Oral at bedtime PRN Insomnia  ondansetron Injectable 4 milliGRAM(s) IV Push every 6 hours PRN Nausea and/or Vomiting      REVIEW OF SYSTEMS:  CONSTITUTIONAL: No fever, weight loss, or fatigue  RESPIRATORY: No shortness of breath  CARDIOVASCULAR:  pain in ribs   GASTROINTESTINAL: No abdominal pain.  GENITOURINARY: No dysuria  NEUROLOGICAL: No headaches  SKIN: No itching, burning, rashes  Ext: pain in back     Vital Signs Last 24 Hrs  T(C): 36.8 (14 Jan 2024 05:00), Max: 37 (13 Jan 2024 15:31)  T(F): 98.2 (14 Jan 2024 05:00), Max: 98.6 (13 Jan 2024 15:31)  HR: 93 (14 Jan 2024 05:00) (65 - 93)  BP: 153/84 (14 Jan 2024 05:00) (138/78 - 167/91)  BP(mean): 108 (13 Jan 2024 11:15) (98 - 108)  RR: 18 (14 Jan 2024 05:00) (18 - 18)  SpO2: 93% (14 Jan 2024 05:00) (93% - 97%)    Parameters below as of 14 Jan 2024 05:00  Patient On (Oxygen Delivery Method): room air        PHYSICAL EXAMINATION:  GENERAL: NAD, well built  HEAD:  Atraumatic, Normocephalic  EYES:  conjunctiva and sclera clear  CHEST/LUNG: Clear to auscultation. No rales, rhonchi, wheezing, or rubs  HEART: Regular rate and rhythm; No murmurs, rubs, or gallops  ABDOMEN: Soft, Nontender, Nondistended; Bowel sounds present  NERVOUS SYSTEM:  Alert & Oriented X3,    EXTREMITIES:  2+ Peripheral Pulses, No clubbing, cyanosis, or edema.   SKIN: warm dry                          5.7    11.09 )-----------( 306      ( 13 Jan 2024 00:50 )             16.5     01-13    133<L>  |  105  |  25<H>  ----------------------------<  114<H>  4.4   |  23  |  1.29    Ca    8.8      13 Jan 2024 00:50    TPro  7.8  /  Alb  3.6  /  TBili  7.8<H>  /  DBili  3.5<H>  /  AST  200<H>  /  ALT  117<H>  /  AlkPhos  165<H>  01-13    LIVER FUNCTIONS - ( 13 Jan 2024 00:50 )  Alb: 3.6 g/dL / Pro: 7.8 g/dL / ALK PHOS: 165 U/L / ALT: 117 U/L DA / AST: 200 U/L / GGT: x               PT/INR - ( 13 Jan 2024 00:50 )   PT: 13.1 sec;   INR: 1.15 ratio         PTT - ( 13 Jan 2024 00:50 )  PTT:31.1 sec    CAPILLARY BLOOD GLUCOSE      RADIOLOGY & ADDITIONAL TESTS:

## 2024-01-14 NOTE — DISCHARGE NOTE PROVIDER - NSDCCAREPROVSEEN_GEN_ALL_CORE_FT
Michael Mclain, Ann Murry, Rosanne  Julian, Shirley Block, [facility] Michael Mclain, Ann Murry, Rosanne  Julian, Shirley Block, [facility] Michael Mclain, Ann Murry, Rosanne  Julian, Shirley Block, [facility]

## 2024-01-14 NOTE — DISCHARGE NOTE PROVIDER - HOSPITAL COURSE
Patient is a 43y M with PMHx of gout, sickle cell disease (baseline Hb 5-6, follows with Dr. Jordan) with multiple admissions for pain crisis (last one in Dec 23'). He presented with back, knee and rib pain which started the day prior to admission. He described the pain as 10/10 and sharp in nature, mentions its similar to his prior sickle cell crises. Pt endorses some chills and nausea, along with some dizziness. Pt states his crisis are triggered by low blood levels usually. He was admitted for sickle cell crisis. On admission his Hb was 5.7 he received 2 units prbc post transfusion was 7. Chest xray showed no features of acute chest syndrome. He was on a pain regimen (dilaudid 2mg q3 and 1mgq3 for severe and moderate pain respectively) his pain was adequately managed. He was continued on his medication for gout, he did not experience acute flare up. His labs revealed transaminitis, US JAIME from prior admission in dec 23' showed diffuse liver disease; likely secondary to multiple transfusion causing iron deposition.      Patient is a 43y M with PMHx of gout, sickle cell disease (baseline Hb 5-6, follows with Dr. Jordan) with multiple admissions for pain crisis (last one in Dec 23'). He presented with back, knee and rib pain which started the day prior to admission. He described the pain as 10/10 and sharp in nature, mentions its similar to his prior sickle cell crises. Pt endorses some chills and nausea, along with some dizziness. Pt states his crisis are triggered by low blood levels usually. He was admitted for sickle cell crisis. On admission his Hb was 5.7 he received 2 units prbc post transfusion was 7. Chest xray showed no features of acute chest syndrome. He was on a pain regimen (dilaudid 2mg q3 and 1mgq3 for severe and moderate pain respectively) his pain was adequately managed. He was continued on his medication for gout, he did not experience acute flare up. His labs revealed transaminitis, US JAIME from prior admission in dec 23' showed diffuse liver disease; likely secondary to multiple transfusion causing iron deposition. He has not reported any dizziness or lightheadedness. His pain has been well controlled.     Patient is a 43y M with PMHx of gout, sickle cell disease (baseline Hb 5-6, follows with Dr. Jordan) with multiple admissions for pain crisis (last one in Dec 23'). He presented with back, knee and rib pain which started the day prior to admission. He described the pain as 10/10 and sharp in nature, mentions its similar to his prior sickle cell crises. Pt endorses some chills and nausea, along with some dizziness. Pt states his crisis are triggered by low blood levels usually. He was admitted for sickle cell crisis. On admission his Hb was 5.7 he received 2 units prbc post transfusion was 7. Chest xray showed no features of acute chest syndrome. He was on a pain regimen (dilaudid 2mg q3 and 1mgq3 for severe and moderate pain respectively) his pain was adequately managed. He was continued on his medication for gout, he did not experience acute flare up. His labs revealed transaminitis, US JAIME from prior admission in dec 23' showed diffuse liver disease; likely secondary to multiple transfusion causing iron deposition. He has not reported any dizziness or lightheadedness. His pain has been well controlled.    Patient is now stable for discharge.     This is only a summary of the hospital course. Please refer to patient chart for further details. Patient is a 43y M with PMHx of gout, sickle cell disease (baseline Hb 5-6, follows with Dr. Jordan) with multiple admissions for pain crisis (last one in Dec 23'). He presented with back, knee and rib pain which started the day prior to admission. He described the pain as 10/10 and sharp in nature, mentions its similar to his prior sickle cell crises. Pt endorses some chills and nausea, along with some dizziness. Pt states his crisis are triggered by low blood levels usually. He was admitted for sickle cell crisis. On admission his Hb was 5.7 he received 2 units prbc post transfusion was 7. Chest xray showed no features of acute chest syndrome. He was on a pain regimen (dilaudid 2mg q3 and 1mgq3 for severe and moderate pain respectively) his pain was adequately managed. He was continued on his medication for gout, he did not experience acute flare up. His labs revealed transaminitis, US JAIME from prior admission in dec 23' showed diffuse liver disease; likely secondary to multiple transfusion causing iron deposition. Outpatient MRCP is scheduled.

## 2024-01-14 NOTE — DISCHARGE NOTE PROVIDER - CARE PROVIDERS DIRECT ADDRESSES
,[email or web address]

## 2024-01-14 NOTE — PATIENT PROFILE ADULT - FALL HARM RISK - HARM RISK INTERVENTIONS
Communicate Risk of Fall with Harm to all staff/Reinforce activity limits and safety measures with patient and family/Tailored Fall Risk Interventions/Visual Cue: Yellow wristband and red socks/Bed in lowest position, wheels locked, appropriate side rails in place/Call bell, personal items and telephone in reach/Instruct patient to call for assistance before getting out of bed or chair/Non-slip footwear when patient is out of bed/[place] to call system/Physically safe environment - no spills, clutter or unnecessary equipment/Purposeful Proactive Rounding/Room/bathroom lighting operational, light cord in reach Communicate Risk of Fall with Harm to all staff/Reinforce activity limits and safety measures with patient and family/Tailored Fall Risk Interventions/Visual Cue: Yellow wristband and red socks/Bed in lowest position, wheels locked, appropriate side rails in place/Call bell, personal items and telephone in reach/Instruct patient to call for assistance before getting out of bed or chair/Non-slip footwear when patient is out of bed/[place] to call system/Physically safe environment - no spills, clutter or unnecessary equipment/Purposeful Proactive Rounding/Room/bathroom lighting operational, light cord in reach

## 2024-01-15 ENCOUNTER — TRANSCRIPTION ENCOUNTER (OUTPATIENT)
Age: 44
End: 2024-01-15

## 2024-01-15 VITALS
RESPIRATION RATE: 18 BRPM | SYSTOLIC BLOOD PRESSURE: 155 MMHG | HEART RATE: 85 BPM | TEMPERATURE: 99 F | DIASTOLIC BLOOD PRESSURE: 81 MMHG | OXYGEN SATURATION: 95 %

## 2024-01-15 LAB
ALBUMIN SERPL ELPH-MCNC: 3.2 G/DL — LOW (ref 3.5–5)
ALBUMIN SERPL ELPH-MCNC: 3.2 G/DL — LOW (ref 3.5–5)
ALP SERPL-CCNC: 146 U/L — HIGH (ref 40–120)
ALP SERPL-CCNC: 146 U/L — HIGH (ref 40–120)
ALT FLD-CCNC: 119 U/L DA — HIGH (ref 10–60)
ALT FLD-CCNC: 119 U/L DA — HIGH (ref 10–60)
AST SERPL-CCNC: 177 U/L — HIGH (ref 10–40)
AST SERPL-CCNC: 177 U/L — HIGH (ref 10–40)
BILIRUB SERPL-MCNC: 8.5 MG/DL — HIGH (ref 0.2–1.2)
BILIRUB SERPL-MCNC: 8.5 MG/DL — HIGH (ref 0.2–1.2)
BUN SERPL-MCNC: 15 MG/DL — SIGNIFICANT CHANGE UP (ref 7–18)
BUN SERPL-MCNC: 15 MG/DL — SIGNIFICANT CHANGE UP (ref 7–18)
CALCIUM SERPL-MCNC: 8.7 MG/DL — SIGNIFICANT CHANGE UP (ref 8.4–10.5)
CALCIUM SERPL-MCNC: 8.7 MG/DL — SIGNIFICANT CHANGE UP (ref 8.4–10.5)
CHLORIDE SERPL-SCNC: 108 MMOL/L — SIGNIFICANT CHANGE UP (ref 96–108)
CHLORIDE SERPL-SCNC: 108 MMOL/L — SIGNIFICANT CHANGE UP (ref 96–108)
CO2 SERPL-SCNC: 22 MMOL/L — SIGNIFICANT CHANGE UP (ref 22–31)
CO2 SERPL-SCNC: 22 MMOL/L — SIGNIFICANT CHANGE UP (ref 22–31)
CREAT SERPL-MCNC: 0.79 MG/DL — SIGNIFICANT CHANGE UP (ref 0.5–1.3)
CREAT SERPL-MCNC: 0.79 MG/DL — SIGNIFICANT CHANGE UP (ref 0.5–1.3)
EGFR: 112 ML/MIN/1.73M2 — SIGNIFICANT CHANGE UP
EGFR: 112 ML/MIN/1.73M2 — SIGNIFICANT CHANGE UP
GLUCOSE SERPL-MCNC: 101 MG/DL — HIGH (ref 70–99)
GLUCOSE SERPL-MCNC: 101 MG/DL — HIGH (ref 70–99)
HCT VFR BLD CALC: 21.1 % — LOW (ref 39–50)
HCT VFR BLD CALC: 21.1 % — LOW (ref 39–50)
HGB BLD-MCNC: 7.3 G/DL — LOW (ref 13–17)
HGB BLD-MCNC: 7.3 G/DL — LOW (ref 13–17)
MAGNESIUM SERPL-MCNC: 1.5 MG/DL — SIGNIFICANT CHANGE UP (ref 1.6–2.6)
MAGNESIUM SERPL-MCNC: 1.5 MG/DL — SIGNIFICANT CHANGE UP (ref 1.6–2.6)
MCHC RBC-ENTMCNC: 30.4 PG — SIGNIFICANT CHANGE UP (ref 27–34)
MCHC RBC-ENTMCNC: 30.4 PG — SIGNIFICANT CHANGE UP (ref 27–34)
MCHC RBC-ENTMCNC: 34.6 GM/DL — SIGNIFICANT CHANGE UP (ref 32–36)
MCHC RBC-ENTMCNC: 34.6 GM/DL — SIGNIFICANT CHANGE UP (ref 32–36)
MCV RBC AUTO: 87.9 FL — SIGNIFICANT CHANGE UP (ref 80–100)
MCV RBC AUTO: 87.9 FL — SIGNIFICANT CHANGE UP (ref 80–100)
NRBC # BLD: 1 /100 WBCS — HIGH (ref 0–0)
NRBC # BLD: 1 /100 WBCS — HIGH (ref 0–0)
PHOSPHATE SERPL-MCNC: 3.3 MG/DL — SIGNIFICANT CHANGE UP (ref 2.5–4.5)
PHOSPHATE SERPL-MCNC: 3.3 MG/DL — SIGNIFICANT CHANGE UP (ref 2.5–4.5)
PLATELET # BLD AUTO: 259 K/UL — SIGNIFICANT CHANGE UP (ref 150–400)
PLATELET # BLD AUTO: 259 K/UL — SIGNIFICANT CHANGE UP (ref 150–400)
POTASSIUM SERPL-MCNC: 3.9 MMOL/L — SIGNIFICANT CHANGE UP (ref 3.5–5.3)
POTASSIUM SERPL-MCNC: 3.9 MMOL/L — SIGNIFICANT CHANGE UP (ref 3.5–5.3)
POTASSIUM SERPL-SCNC: 3.9 MMOL/L — SIGNIFICANT CHANGE UP (ref 3.5–5.3)
POTASSIUM SERPL-SCNC: 3.9 MMOL/L — SIGNIFICANT CHANGE UP (ref 3.5–5.3)
PROT SERPL-MCNC: 7.3 G/DL — SIGNIFICANT CHANGE UP (ref 6–8.3)
PROT SERPL-MCNC: 7.3 G/DL — SIGNIFICANT CHANGE UP (ref 6–8.3)
RBC # BLD: 2.4 M/UL — LOW (ref 4.2–5.8)
RBC # BLD: 2.4 M/UL — LOW (ref 4.2–5.8)
RBC # FLD: 20.1 % — HIGH (ref 10.3–14.5)
RBC # FLD: 20.1 % — HIGH (ref 10.3–14.5)
SODIUM SERPL-SCNC: 137 MMOL/L — SIGNIFICANT CHANGE UP (ref 135–145)
SODIUM SERPL-SCNC: 137 MMOL/L — SIGNIFICANT CHANGE UP (ref 135–145)
WBC # BLD: 6.83 K/UL — SIGNIFICANT CHANGE UP (ref 3.8–10.5)
WBC # BLD: 6.83 K/UL — SIGNIFICANT CHANGE UP (ref 3.8–10.5)
WBC # FLD AUTO: 6.83 K/UL — SIGNIFICANT CHANGE UP (ref 3.8–10.5)
WBC # FLD AUTO: 6.83 K/UL — SIGNIFICANT CHANGE UP (ref 3.8–10.5)

## 2024-01-15 PROCEDURE — 36415 COLL VENOUS BLD VENIPUNCTURE: CPT

## 2024-01-15 PROCEDURE — 86901 BLOOD TYPING SEROLOGIC RH(D): CPT

## 2024-01-15 PROCEDURE — 83550 IRON BINDING TEST: CPT

## 2024-01-15 PROCEDURE — 84100 ASSAY OF PHOSPHORUS: CPT

## 2024-01-15 PROCEDURE — 86922 COMPATIBILITY TEST ANTIGLOB: CPT

## 2024-01-15 PROCEDURE — 86850 RBC ANTIBODY SCREEN: CPT

## 2024-01-15 PROCEDURE — 93005 ELECTROCARDIOGRAM TRACING: CPT

## 2024-01-15 PROCEDURE — 96374 THER/PROPH/DIAG INJ IV PUSH: CPT

## 2024-01-15 PROCEDURE — 83735 ASSAY OF MAGNESIUM: CPT

## 2024-01-15 PROCEDURE — 80053 COMPREHEN METABOLIC PANEL: CPT

## 2024-01-15 PROCEDURE — 99285 EMERGENCY DEPT VISIT HI MDM: CPT | Mod: 25

## 2024-01-15 PROCEDURE — 85045 AUTOMATED RETICULOCYTE COUNT: CPT

## 2024-01-15 PROCEDURE — P9040: CPT

## 2024-01-15 PROCEDURE — 85610 PROTHROMBIN TIME: CPT

## 2024-01-15 PROCEDURE — 86900 BLOOD TYPING SEROLOGIC ABO: CPT

## 2024-01-15 PROCEDURE — 96376 TX/PRO/DX INJ SAME DRUG ADON: CPT

## 2024-01-15 PROCEDURE — 85730 THROMBOPLASTIN TIME PARTIAL: CPT

## 2024-01-15 PROCEDURE — 80048 BASIC METABOLIC PNL TOTAL CA: CPT

## 2024-01-15 PROCEDURE — 85025 COMPLETE CBC W/AUTO DIFF WBC: CPT

## 2024-01-15 PROCEDURE — 85027 COMPLETE CBC AUTOMATED: CPT

## 2024-01-15 PROCEDURE — 83540 ASSAY OF IRON: CPT

## 2024-01-15 PROCEDURE — 71046 X-RAY EXAM CHEST 2 VIEWS: CPT

## 2024-01-15 PROCEDURE — 36430 TRANSFUSION BLD/BLD COMPNT: CPT

## 2024-01-15 PROCEDURE — 99239 HOSP IP/OBS DSCHRG MGMT >30: CPT | Mod: GC

## 2024-01-15 PROCEDURE — 82746 ASSAY OF FOLIC ACID SERUM: CPT

## 2024-01-15 PROCEDURE — 82607 VITAMIN B-12: CPT

## 2024-01-15 PROCEDURE — 82248 BILIRUBIN DIRECT: CPT

## 2024-01-15 RX ORDER — ACETAMINOPHEN 500 MG
2 TABLET ORAL
Qty: 0 | Refills: 0 | DISCHARGE
Start: 2024-01-15

## 2024-01-15 RX ADMIN — HYDROMORPHONE HYDROCHLORIDE 1 MILLIGRAM(S): 2 INJECTION INTRAMUSCULAR; INTRAVENOUS; SUBCUTANEOUS at 04:00

## 2024-01-15 RX ADMIN — HYDROMORPHONE HYDROCHLORIDE 1 MILLIGRAM(S): 2 INJECTION INTRAMUSCULAR; INTRAVENOUS; SUBCUTANEOUS at 01:10

## 2024-01-15 RX ADMIN — HYDROMORPHONE HYDROCHLORIDE 1 MILLIGRAM(S): 2 INJECTION INTRAMUSCULAR; INTRAVENOUS; SUBCUTANEOUS at 07:37

## 2024-01-15 RX ADMIN — Medication 1 MILLIGRAM(S): at 11:15

## 2024-01-15 RX ADMIN — HYDROMORPHONE HYDROCHLORIDE 1 MILLIGRAM(S): 2 INJECTION INTRAMUSCULAR; INTRAVENOUS; SUBCUTANEOUS at 10:44

## 2024-01-15 RX ADMIN — HYDROMORPHONE HYDROCHLORIDE 1 MILLIGRAM(S): 2 INJECTION INTRAMUSCULAR; INTRAVENOUS; SUBCUTANEOUS at 03:20

## 2024-01-15 RX ADMIN — HYDROMORPHONE HYDROCHLORIDE 1 MILLIGRAM(S): 2 INJECTION INTRAMUSCULAR; INTRAVENOUS; SUBCUTANEOUS at 00:15

## 2024-01-15 RX ADMIN — HYDROMORPHONE HYDROCHLORIDE 1 MILLIGRAM(S): 2 INJECTION INTRAMUSCULAR; INTRAVENOUS; SUBCUTANEOUS at 06:37

## 2024-01-15 RX ADMIN — Medication 100 MILLIGRAM(S): at 11:16

## 2024-01-15 RX ADMIN — HYDROMORPHONE HYDROCHLORIDE 1 MILLIGRAM(S): 2 INJECTION INTRAMUSCULAR; INTRAVENOUS; SUBCUTANEOUS at 12:48

## 2024-01-15 RX ADMIN — HYDROMORPHONE HYDROCHLORIDE 1 MILLIGRAM(S): 2 INJECTION INTRAMUSCULAR; INTRAVENOUS; SUBCUTANEOUS at 09:44

## 2024-01-15 NOTE — CONSULT NOTE ADULT - SUBJECTIVE AND OBJECTIVE BOX
Patient is a 43y old  Male who presents with a chief complaint of Sickle Cell Crisis (2024 16:43)      HPI:  This is a 44 y/o M with PMHx of gout, sickle cell disease (baseline Hb 6, follows with Dr. Jordan) with multiple admissions for pain crisis (last one in Dec 23') who presents with back pain. Pain started yesterday afternoon and has started in his knees and ribs as well. pain is 10/10 sharp in nature. Reports that the pain is similar to his prior sickle cell crises. Pt endorses some chills and nausea, along with some dizziness. Pt states his crisis are triggered by low blood levels usually. Pt mentions he has leg swelling for the past few months as well. Pt denies any recent illnesses, sick contacts, travel, fever, SOB, CP, headaches, vomiting, diarrhea, constipation, numbness or tingling.    (2024 06:23)       ROS:  Negative except for:    PAST MEDICAL & SURGICAL HISTORY:  Sickle cell anemia      Gout      History of cholecystectomy          SOCIAL HISTORY:    FAMILY HISTORY:  Family history of sickle cell trait (Father, Mother)    Patient's father is  (Father)    Patient's mother is  (Mother)        MEDICATIONS  (STANDING):  allopurinol 100 milliGRAM(s) Oral daily  enoxaparin Injectable 40 milliGRAM(s) SubCutaneous every 24 hours  folic acid 1 milliGRAM(s) Oral every 24 hours  lactated ringers. 1000 milliLiter(s) (70 mL/Hr) IV Continuous <Continuous>    MEDICATIONS  (PRN):  acetaminophen     Tablet .. 650 milliGRAM(s) Oral every 6 hours PRN Temp greater or equal to 38C (100.4F), Mild Pain (1 - 3)  HYDROmorphone  Injectable 1 milliGRAM(s) IV Push every 3 hours PRN Moderate Pain (4 - 6)  HYDROmorphone  Injectable 2 milliGRAM(s) IV Push every 3 hours PRN Severe Pain (7 - 10)  melatonin 3 milliGRAM(s) Oral at bedtime PRN Insomnia  ondansetron Injectable 4 milliGRAM(s) IV Push every 6 hours PRN Nausea and/or Vomiting      Allergies    ceftriaxone (Anaphylaxis)  hydroxyurea (Other)  penicillin (Pruritus)  piperacillin-tazobactam (Urticaria)    Intolerances        Vital Signs Last 24 Hrs  T(C): 37.1 (15 Jaxon 2024 05:17), Max: 37.2 (2024 20:57)  T(F): 98.7 (15 Jaxon 2024 05:17), Max: 98.9 (2024 20:57)  HR: 85 (15 Jaxon 2024 05:17) (77 - 85)  BP: 155/81 (15 Jaxon 2024 05:17) (151/85 - 162/86)  BP(mean): --  RR: 18 (15 Jaxon 2024 05:17) (17 - 18)  SpO2: 95% (15 Jaxon 2024 05:17) (94% - 95%)    Parameters below as of 15 Jaxon 2024 05:17  Patient On (Oxygen Delivery Method): room air        PHYSICAL EXAM  General: adult in NAD  HEENT: clear oropharynx, anicteric sclera, pink conjunctiva  Neck: supple  CV: normal S1/S2 with no murmur rubs or gallops  Lungs: positive air movement b/l ant lungs,clear to auscultation, no wheezes, no rales  Abdomen: soft non-tender non-distended, no hepatosplenomegaly  Ext: no clubbing cyanosis or edema  Skin: no rashes and no petechiae  Neuro: alert and oriented X 4, no focal deficits      LABS:                          7.5    7.77  )-----------( 258      ( 2024 11:05 )             21.5         Mean Cell Volume : 89.2 fl  Mean Cell Hemoglobin : 31.1 pg  Mean Cell Hemoglobin Concentration : 34.9 gm/dL  Auto Neutrophil # : x  Auto Lymphocyte # : x  Auto Monocyte # : x  Auto Eosinophil # : x  Auto Basophil # : x  Auto Neutrophil % : x  Auto Lymphocyte % : x  Auto Monocyte % : x  Auto Eosinophil % : x  Auto Basophil % : x      Serial CBC's   @ 11:05  Hct-21.5 / Hgb-7.5 / Plat-258 / RBC-2.41 / WBC-7.77  Serial CBC's   @ 00:50  Hct-16.5 / Hgb-5.7 / Plat-306 / RBC-1.86 / WBC-11.09      -14    137  |  108  |  19<H>  ----------------------------<  123<H>  3.8   |  24  |  0.95    Ca    8.8      2024 11:05  Phos  3.2       Mg     1.6                 Reticulocyte Percent: 16.7 % ( @ 00:50)  Iron - Total Binding Capacity.: 265 ug/dL ( @ 00:50)  Vitamin B12, Serum: 533 pg/mL ( @ 00:50)  Folate, Serum: >20.0 ng/mL ( @ 00:50)              BLOOD SMEAR INTERPRETATION:       RADIOLOGY & ADDITIONAL STUDIES:

## 2024-01-15 NOTE — DISCHARGE NOTE NURSING/CASE MANAGEMENT/SOCIAL WORK - NSDCPEFALRISK_GEN_ALL_CORE
For information on Fall & Injury Prevention, visit: https://www.[facility]/news/fall-prevention-protects-and-maintains-health-and-mobility OR  https://www.[facility]/news/fall-prevention-tips-to-avoid-injury OR  https://www.cdc.gov/steadi/patient.html For information on Fall & Injury Prevention, visit: https://www.[facility]/news/fall-prevention-protects-and-maintains-health-and-mobility OR  https://www.[facility]/news/fall-prevention-tips-to-avoid-injury OR  https://www.cdc.gov/steadi/patient.html For information on Fall & Injury Prevention, visit: https://www.[facility]/news/fall-prevention-protects-and-maintains-health-and-mobility OR  https://www.[facility]/news/fall-prevention-tips-to-avoid-injury OR  https://www.cdc.gov/steadi/patient.html

## 2024-01-15 NOTE — CONSULT NOTE ADULT - ASSESSMENT
43 year old male with SCD had pain all over with Hb 5.7.  His baseline is 5-6.  No fever or chills.  He also has increased alk and direct bilirubin.  oprevious CT showed enlarged periportal nodes    1 SCD  had 2 u PRBC.  Hb 7.5  the pain  he had is much less now  no fever or sob or chest pain    2 periportal lymphadenopathy  increased direct logan and Alk  he has schedule for outpt MRCP on thursday  if he can not get this done as inpt today, he can be discharged and f/u as outpt

## 2024-01-15 NOTE — DISCHARGE NOTE NURSING/CASE MANAGEMENT/SOCIAL WORK - PATIENT PORTAL LINK FT
You can access the FollowMyHealth Patient Portal offered by [facility] by registering at the following website: [email or web address]. By joining [employer]’s FollowMyHealth portal, you will also be able to view your health information using other applications (apps) compatible with our system. You can access the FollowMyHealth Patient Portal offered by [facility] by registering at the following website: [email or web address]. By joining [employer]’s FollowMyHealth portal, you will also be able to view your health information using other applications (apps) compatible with our system. You can access the FollowMyHealth Patient Portal offered by [facility] by registering at the following website: [email or web address]. By joining [employer]’s FollowMyHealth portal, you will also be able to view your health information using other applications (apps) compatible with our system.

## 2024-02-01 NOTE — DISCHARGE NOTE PROVIDER - NSDCACTIVITY_GEN_ALL_CORE
No restrictions
Jose Liao  Orthopaedic Surgery  [address] [place] [id number]  Phone: [phone]  Fax: [phone]  Follow Up Time: 4-6 Days

## 2024-03-17 ENCOUNTER — INPATIENT (INPATIENT)
Facility: HOSPITAL | Age: 44
LOS: 2 days | Discharge: HOME | DRG: 812 | End: 2024-03-20
Attending: STUDENT IN AN ORGANIZED HEALTH CARE EDUCATION/TRAINING PROGRAM | Admitting: STUDENT IN AN ORGANIZED HEALTH CARE EDUCATION/TRAINING PROGRAM
Payer: MEDICAID

## 2024-03-17 VITALS
DIASTOLIC BLOOD PRESSURE: 78 MMHG | OXYGEN SATURATION: 98 % | HEIGHT: 69 IN | RESPIRATION RATE: 18 BRPM | WEIGHT: 198.42 LBS | SYSTOLIC BLOOD PRESSURE: 162 MMHG | TEMPERATURE: 99 F | HEART RATE: 105 BPM

## 2024-03-17 DIAGNOSIS — Z90.49 ACQUIRED ABSENCE OF OTHER SPECIFIED PARTS OF DIGESTIVE TRACT: Chronic | ICD-10-CM

## 2024-03-17 PROCEDURE — 99285 EMERGENCY DEPT VISIT HI MDM: CPT | Mod: 25

## 2024-03-17 NOTE — ED ADULT NURSE NOTE - OBJECTIVE STATEMENT
Patient c/o lower back pain, headache and dizziness x5 days. Pt reported recent low Hemoglobin 5 days ago, PCP aware. Patient denies any chest pain, sob, difficulty urination, diarrhea. Pt reported HX of sickle cell. Right chest port-cath noted.

## 2024-03-17 NOTE — ED ADULT TRIAGE NOTE - CHIEF COMPLAINT QUOTE
patient reports abnormal labs hgb 5.7 dizzy, nausea, weakness & generalized pain x today hx sickle cell

## 2024-03-17 NOTE — ED ADULT NURSE NOTE - NSFALLUNIVINTERV_ED_ALL_ED
Bed/Stretcher in lowest position, wheels locked, appropriate side rails in place/Call bell, personal items and telephone in reach/Instruct patient to call for assistance before getting out of bed/chair/stretcher/Non-slip footwear applied when patient is off stretcher/[place] to call system/Physically safe environment - no spills, clutter or unnecessary equipment/Purposeful proactive rounding/Room/bathroom lighting operational, light cord in reach

## 2024-03-18 DIAGNOSIS — N17.9 ACUTE KIDNEY FAILURE, UNSPECIFIED: ICD-10-CM

## 2024-03-18 DIAGNOSIS — D57.00 HB-SS DISEASE WITH CRISIS, UNSPECIFIED: ICD-10-CM

## 2024-03-18 DIAGNOSIS — Z29.9 ENCOUNTER FOR PROPHYLACTIC MEASURES, UNSPECIFIED: ICD-10-CM

## 2024-03-18 DIAGNOSIS — M25.50 PAIN IN UNSPECIFIED JOINT: ICD-10-CM

## 2024-03-18 DIAGNOSIS — M10.9 GOUT, UNSPECIFIED: ICD-10-CM

## 2024-03-18 DIAGNOSIS — R74.01 ELEVATION OF LEVELS OF LIVER TRANSAMINASE LEVELS: ICD-10-CM

## 2024-03-18 DIAGNOSIS — D64.9 ANEMIA, UNSPECIFIED: ICD-10-CM

## 2024-03-18 LAB
ALBUMIN SERPL ELPH-MCNC: 3.5 G/DL — SIGNIFICANT CHANGE UP (ref 3.5–5)
ALBUMIN SERPL ELPH-MCNC: 3.6 G/DL — SIGNIFICANT CHANGE UP (ref 3.5–5)
ALP SERPL-CCNC: 132 U/L — HIGH (ref 40–120)
ALP SERPL-CCNC: 133 U/L — HIGH (ref 40–120)
ALT FLD-CCNC: 109 U/L DA — HIGH (ref 10–60)
ALT FLD-CCNC: 113 U/L DA — HIGH (ref 10–60)
ANION GAP SERPL CALC-SCNC: 5 MMOL/L — SIGNIFICANT CHANGE UP (ref 5–17)
ANION GAP SERPL CALC-SCNC: 7 MMOL/L — SIGNIFICANT CHANGE UP (ref 5–17)
APTT BLD: >200 SEC — CRITICAL HIGH (ref 24.5–35.6)
AST SERPL-CCNC: 194 U/L — HIGH (ref 10–40)
AST SERPL-CCNC: 194 U/L — HIGH (ref 10–40)
BASOPHILS # BLD AUTO: 0 K/UL — SIGNIFICANT CHANGE UP (ref 0–0.2)
BASOPHILS NFR BLD AUTO: 0 % — SIGNIFICANT CHANGE UP (ref 0–2)
BILIRUB SERPL-MCNC: 10 MG/DL — HIGH (ref 0.2–1.2)
BILIRUB SERPL-MCNC: 10.8 MG/DL — HIGH (ref 0.2–1.2)
BUN SERPL-MCNC: 27 MG/DL — HIGH (ref 7–18)
BUN SERPL-MCNC: 28 MG/DL — HIGH (ref 7–18)
CALCIUM SERPL-MCNC: 8.8 MG/DL — SIGNIFICANT CHANGE UP (ref 8.4–10.5)
CALCIUM SERPL-MCNC: 9.3 MG/DL — SIGNIFICANT CHANGE UP (ref 8.4–10.5)
CHLORIDE SERPL-SCNC: 102 MMOL/L — SIGNIFICANT CHANGE UP (ref 96–108)
CHLORIDE SERPL-SCNC: 109 MMOL/L — HIGH (ref 96–108)
CO2 SERPL-SCNC: 21 MMOL/L — LOW (ref 22–31)
CO2 SERPL-SCNC: 22 MMOL/L — SIGNIFICANT CHANGE UP (ref 22–31)
CREAT SERPL-MCNC: 1.28 MG/DL — SIGNIFICANT CHANGE UP (ref 0.5–1.3)
CREAT SERPL-MCNC: 1.75 MG/DL — HIGH (ref 0.5–1.3)
EGFR: 49 ML/MIN/1.73M2 — LOW
EGFR: 71 ML/MIN/1.73M2 — SIGNIFICANT CHANGE UP
EOSINOPHIL # BLD AUTO: 0 K/UL — SIGNIFICANT CHANGE UP (ref 0–0.5)
EOSINOPHIL NFR BLD AUTO: 0 % — SIGNIFICANT CHANGE UP (ref 0–6)
GLUCOSE SERPL-MCNC: 102 MG/DL — HIGH (ref 70–99)
GLUCOSE SERPL-MCNC: 95 MG/DL — SIGNIFICANT CHANGE UP (ref 70–99)
HCT VFR BLD CALC: 15.9 % — CRITICAL LOW (ref 39–50)
HCT VFR BLD CALC: 23.3 % — LOW (ref 39–50)
HGB BLD-MCNC: 5.8 G/DL — CRITICAL LOW (ref 13–17)
HGB BLD-MCNC: 8.3 G/DL — LOW (ref 13–17)
INR BLD: 1.09 RATIO — SIGNIFICANT CHANGE UP (ref 0.85–1.18)
LIDOCAIN IGE QN: 111 U/L — HIGH (ref 13–75)
LYMPHOCYTES # BLD AUTO: 35 % — SIGNIFICANT CHANGE UP (ref 13–44)
LYMPHOCYTES # BLD AUTO: 4.56 K/UL — HIGH (ref 1–3.3)
MAGNESIUM SERPL-MCNC: 1.9 MG/DL — SIGNIFICANT CHANGE UP (ref 1.6–2.6)
MCHC RBC-ENTMCNC: 32.2 PG — SIGNIFICANT CHANGE UP (ref 27–34)
MCHC RBC-ENTMCNC: 32.3 PG — SIGNIFICANT CHANGE UP (ref 27–34)
MCHC RBC-ENTMCNC: 35.6 GM/DL — SIGNIFICANT CHANGE UP (ref 32–36)
MCHC RBC-ENTMCNC: 36.5 GM/DL — HIGH (ref 32–36)
MCV RBC AUTO: 88.3 FL — SIGNIFICANT CHANGE UP (ref 80–100)
MCV RBC AUTO: 90.7 FL — SIGNIFICANT CHANGE UP (ref 80–100)
MONOCYTES # BLD AUTO: 0.52 K/UL — SIGNIFICANT CHANGE UP (ref 0–0.9)
MONOCYTES NFR BLD AUTO: 4 % — SIGNIFICANT CHANGE UP (ref 2–14)
NEUTROPHILS # BLD AUTO: 7.68 K/UL — HIGH (ref 1.8–7.4)
NEUTROPHILS NFR BLD AUTO: 59 % — SIGNIFICANT CHANGE UP (ref 43–77)
NRBC # BLD: 2 /100 WBCS — HIGH (ref 0–0)
PHOSPHATE SERPL-MCNC: 3.8 MG/DL — SIGNIFICANT CHANGE UP (ref 2.5–4.5)
PLATELET # BLD AUTO: 268 K/UL — SIGNIFICANT CHANGE UP (ref 150–400)
PLATELET # BLD AUTO: 293 K/UL — SIGNIFICANT CHANGE UP (ref 150–400)
POTASSIUM SERPL-MCNC: 4.4 MMOL/L — SIGNIFICANT CHANGE UP (ref 3.5–5.3)
POTASSIUM SERPL-MCNC: 4.7 MMOL/L — SIGNIFICANT CHANGE UP (ref 3.5–5.3)
POTASSIUM SERPL-SCNC: 4.4 MMOL/L — SIGNIFICANT CHANGE UP (ref 3.5–5.3)
POTASSIUM SERPL-SCNC: 4.7 MMOL/L — SIGNIFICANT CHANGE UP (ref 3.5–5.3)
PROT SERPL-MCNC: 7.9 G/DL — SIGNIFICANT CHANGE UP (ref 6–8.3)
PROT SERPL-MCNC: 8.1 G/DL — SIGNIFICANT CHANGE UP (ref 6–8.3)
PROTHROM AB SERPL-ACNC: 12.4 SEC — SIGNIFICANT CHANGE UP (ref 9.5–13)
RBC # BLD: 1.8 M/UL — LOW (ref 4.2–5.8)
RBC # BLD: 1.8 M/UL — LOW (ref 4.2–5.8)
RBC # BLD: 2.57 M/UL — LOW (ref 4.2–5.8)
RBC # FLD: 21.2 % — HIGH (ref 10.3–14.5)
RBC # FLD: 24.9 % — HIGH (ref 10.3–14.5)
RETICS #: 399.4 K/UL — HIGH (ref 25–125)
RETICS/RBC NFR: 22.4 % — HIGH (ref 0.5–2.5)
SODIUM SERPL-SCNC: 131 MMOL/L — LOW (ref 135–145)
SODIUM SERPL-SCNC: 135 MMOL/L — SIGNIFICANT CHANGE UP (ref 135–145)
WBC # BLD: 13.02 K/UL — HIGH (ref 3.8–10.5)
WBC # BLD: 8.58 K/UL — SIGNIFICANT CHANGE UP (ref 3.8–10.5)
WBC # FLD AUTO: 13.02 K/UL — HIGH (ref 3.8–10.5)
WBC # FLD AUTO: 8.58 K/UL — SIGNIFICANT CHANGE UP (ref 3.8–10.5)

## 2024-03-18 PROCEDURE — 99222 1ST HOSP IP/OBS MODERATE 55: CPT

## 2024-03-18 PROCEDURE — 74183 MRI ABD W/O CNTR FLWD CNTR: CPT | Mod: 26

## 2024-03-18 PROCEDURE — 71045 X-RAY EXAM CHEST 1 VIEW: CPT | Mod: 26,59

## 2024-03-18 PROCEDURE — 99223 1ST HOSP IP/OBS HIGH 75: CPT

## 2024-03-18 RX ORDER — FOLIC ACID 0.8 MG
1 TABLET ORAL EVERY 24 HOURS
Refills: 0 | Status: DISCONTINUED | OUTPATIENT
Start: 2024-03-18 | End: 2024-03-20

## 2024-03-18 RX ORDER — HYDROMORPHONE HYDROCHLORIDE 2 MG/ML
2 INJECTION INTRAMUSCULAR; INTRAVENOUS; SUBCUTANEOUS EVERY 4 HOURS
Refills: 0 | Status: DISCONTINUED | OUTPATIENT
Start: 2024-03-18 | End: 2024-03-18

## 2024-03-18 RX ORDER — ONDANSETRON 8 MG/1
4 TABLET, FILM COATED ORAL ONCE
Refills: 0 | Status: COMPLETED | OUTPATIENT
Start: 2024-03-18 | End: 2024-03-18

## 2024-03-18 RX ORDER — HYDROMORPHONE HYDROCHLORIDE 2 MG/ML
2 INJECTION INTRAMUSCULAR; INTRAVENOUS; SUBCUTANEOUS ONCE
Refills: 0 | Status: DISCONTINUED | OUTPATIENT
Start: 2024-03-18 | End: 2024-03-18

## 2024-03-18 RX ORDER — SODIUM CHLORIDE 9 MG/ML
1000 INJECTION INTRAMUSCULAR; INTRAVENOUS; SUBCUTANEOUS
Refills: 0 | Status: DISCONTINUED | OUTPATIENT
Start: 2024-03-18 | End: 2024-03-19

## 2024-03-18 RX ORDER — LIDOCAINE 4 G/100G
2 CREAM TOPICAL DAILY
Refills: 0 | Status: DISCONTINUED | OUTPATIENT
Start: 2024-03-18 | End: 2024-03-20

## 2024-03-18 RX ORDER — ALLOPURINOL 300 MG
100 TABLET ORAL DAILY
Refills: 0 | Status: DISCONTINUED | OUTPATIENT
Start: 2024-03-18 | End: 2024-03-18

## 2024-03-18 RX ORDER — PANTOPRAZOLE SODIUM 20 MG/1
40 TABLET, DELAYED RELEASE ORAL
Refills: 0 | Status: DISCONTINUED | OUTPATIENT
Start: 2024-03-18 | End: 2024-03-20

## 2024-03-18 RX ORDER — POLYETHYLENE GLYCOL 3350 17 G/17G
17 POWDER, FOR SOLUTION ORAL DAILY
Refills: 0 | Status: DISCONTINUED | OUTPATIENT
Start: 2024-03-19 | End: 2024-03-20

## 2024-03-18 RX ORDER — ACETAMINOPHEN 500 MG
650 TABLET ORAL EVERY 6 HOURS
Refills: 0 | Status: DISCONTINUED | OUTPATIENT
Start: 2024-03-18 | End: 2024-03-20

## 2024-03-18 RX ORDER — HYDROMORPHONE HYDROCHLORIDE 2 MG/ML
2 INJECTION INTRAMUSCULAR; INTRAVENOUS; SUBCUTANEOUS
Refills: 0 | Status: DISCONTINUED | OUTPATIENT
Start: 2024-03-18 | End: 2024-03-19

## 2024-03-18 RX ORDER — SODIUM CHLORIDE 9 MG/ML
1000 INJECTION INTRAMUSCULAR; INTRAVENOUS; SUBCUTANEOUS ONCE
Refills: 0 | Status: COMPLETED | OUTPATIENT
Start: 2024-03-18 | End: 2024-03-18

## 2024-03-18 RX ORDER — DIPHENHYDRAMINE HCL 50 MG
25 CAPSULE ORAL ONCE
Refills: 0 | Status: COMPLETED | OUTPATIENT
Start: 2024-03-18 | End: 2024-03-18

## 2024-03-18 RX ORDER — SENNA PLUS 8.6 MG/1
2 TABLET ORAL AT BEDTIME
Refills: 0 | Status: DISCONTINUED | OUTPATIENT
Start: 2024-03-18 | End: 2024-03-20

## 2024-03-18 RX ORDER — HEPARIN SODIUM 5000 [USP'U]/ML
5000 INJECTION INTRAVENOUS; SUBCUTANEOUS EVERY 8 HOURS
Refills: 0 | Status: DISCONTINUED | OUTPATIENT
Start: 2024-03-18 | End: 2024-03-20

## 2024-03-18 RX ORDER — HYDROMORPHONE HYDROCHLORIDE 2 MG/ML
1 INJECTION INTRAMUSCULAR; INTRAVENOUS; SUBCUTANEOUS EVERY 4 HOURS
Refills: 0 | Status: DISCONTINUED | OUTPATIENT
Start: 2024-03-18 | End: 2024-03-18

## 2024-03-18 RX ADMIN — HYDROMORPHONE HYDROCHLORIDE 2 MILLIGRAM(S): 2 INJECTION INTRAMUSCULAR; INTRAVENOUS; SUBCUTANEOUS at 23:56

## 2024-03-18 RX ADMIN — Medication 25 MILLIGRAM(S): at 10:12

## 2024-03-18 RX ADMIN — ONDANSETRON 4 MILLIGRAM(S): 8 TABLET, FILM COATED ORAL at 13:49

## 2024-03-18 RX ADMIN — HYDROMORPHONE HYDROCHLORIDE 2 MILLIGRAM(S): 2 INJECTION INTRAMUSCULAR; INTRAVENOUS; SUBCUTANEOUS at 00:00

## 2024-03-18 RX ADMIN — HYDROMORPHONE HYDROCHLORIDE 2 MILLIGRAM(S): 2 INJECTION INTRAMUSCULAR; INTRAVENOUS; SUBCUTANEOUS at 20:58

## 2024-03-18 RX ADMIN — HYDROMORPHONE HYDROCHLORIDE 2 MILLIGRAM(S): 2 INJECTION INTRAMUSCULAR; INTRAVENOUS; SUBCUTANEOUS at 14:56

## 2024-03-18 RX ADMIN — HYDROMORPHONE HYDROCHLORIDE 2 MILLIGRAM(S): 2 INJECTION INTRAMUSCULAR; INTRAVENOUS; SUBCUTANEOUS at 13:49

## 2024-03-18 RX ADMIN — HYDROMORPHONE HYDROCHLORIDE 2 MILLIGRAM(S): 2 INJECTION INTRAMUSCULAR; INTRAVENOUS; SUBCUTANEOUS at 17:41

## 2024-03-18 RX ADMIN — HEPARIN SODIUM 5000 UNIT(S): 5000 INJECTION INTRAVENOUS; SUBCUTANEOUS at 07:13

## 2024-03-18 RX ADMIN — LIDOCAINE 2 PATCH: 4 CREAM TOPICAL at 14:56

## 2024-03-18 RX ADMIN — HYDROMORPHONE HYDROCHLORIDE 2 MILLIGRAM(S): 2 INJECTION INTRAMUSCULAR; INTRAVENOUS; SUBCUTANEOUS at 18:41

## 2024-03-18 RX ADMIN — HYDROMORPHONE HYDROCHLORIDE 2 MILLIGRAM(S): 2 INJECTION INTRAMUSCULAR; INTRAVENOUS; SUBCUTANEOUS at 03:29

## 2024-03-18 RX ADMIN — HYDROMORPHONE HYDROCHLORIDE 1 MILLIGRAM(S): 2 INJECTION INTRAMUSCULAR; INTRAVENOUS; SUBCUTANEOUS at 07:10

## 2024-03-18 RX ADMIN — LIDOCAINE 2 PATCH: 4 CREAM TOPICAL at 11:23

## 2024-03-18 RX ADMIN — PANTOPRAZOLE SODIUM 40 MILLIGRAM(S): 20 TABLET, DELAYED RELEASE ORAL at 07:13

## 2024-03-18 RX ADMIN — SODIUM CHLORIDE 1000 MILLILITER(S): 9 INJECTION INTRAMUSCULAR; INTRAVENOUS; SUBCUTANEOUS at 03:04

## 2024-03-18 RX ADMIN — HYDROMORPHONE HYDROCHLORIDE 1 MILLIGRAM(S): 2 INJECTION INTRAMUSCULAR; INTRAVENOUS; SUBCUTANEOUS at 07:12

## 2024-03-18 RX ADMIN — Medication 25 MILLIGRAM(S): at 03:29

## 2024-03-18 RX ADMIN — LIDOCAINE 2 PATCH: 4 CREAM TOPICAL at 23:23

## 2024-03-18 RX ADMIN — Medication 1 MILLIGRAM(S): at 07:13

## 2024-03-18 NOTE — H&P ADULT - PROBLEM SELECTOR PLAN 3
- SCr of 1.75 on admission  - Baseline appears to be <1  - F/U Urine studies  - F/U Serum osmolality  - Likely pre renal   - F/U Renal US  - IV Fluids - SCr of 1.75 on admission  - Baseline appears to be <1  - F/U Urine studies  - F/U Serum osmolality  - IV Fluids  - F/U repeat BMP

## 2024-03-18 NOTE — H&P ADULT - ASSESSMENT
Patient is a 43 year old male from home, ambulates independently, with PMH of gout and sickle cell diease with chronic anemia and baseline Hb of 6 who presented to the ED generalized pain.  In the ED patients hemodynamically stable and afebrile.  Patient with leukocytosis to 13K and normocytic anemia to 5.8.  PTT elevated >200.  Patient also with MATA of 1.75 (baseline <1) and elevated liver enzymes which appear stable since prior admissions.  Patient is being admitted to medicine for symptomatic anemia and sickle cell pain crisis

## 2024-03-18 NOTE — H&P ADULT - PROBLEM SELECTOR PLAN 4
- Elevated Bilirubin to 10.8  - Elevated Alk phos to 133  - Elevated AST to 194  - Elevated ALT to 113  - Patient with history of elevated LFts  - RUQ US from December 2023 showing diffuse liver diease   - Considered to be 2/2 multiple transfusions causing iron deposition   - appears stable since prior admission

## 2024-03-18 NOTE — H&P ADULT - NSHPREVIEWOFSYSTEMS_GEN_ALL_CORE
CONSTITUTIONAL: No fever, weight loss, or fatigue  EYES: No eye pain, visual disturbances, or discharge  ENT:  No difficulty hearing, tinnitus, vertigo; No sinus or throat pain  NECK: No pain or stiffness  RESPIRATORY: No cough, wheezing, chills or hemoptysis; No Shortness of Breath  CARDIOVASCULAR: No chest pain, palpitations, passing out, dizziness, or leg swelling  GASTROINTESTINAL: No abdominal or epigastric pain. No nausea, vomiting, or hematemesis; No diarrhea or constipation. No melena or hematochezia.  GENITOURINARY: No dysuria, frequency, hematuria, or incontinence  NEUROLOGICAL: No headaches, memory loss, loss of strength, numbness, or tremors  SKIN: No itching, burning, rashes, or lesions CONSTITUTIONAL: No fever, weight loss, + fatigue and whole body pain  EYES: No eye pain, visual disturbances, or discharge  ENT:  No difficulty hearing, tinnitus, vertigo; No sinus or throat pain  NECK: No pain or stiffness  RESPIRATORY: No cough, wheezing, chills or hemoptysis; No Shortness of Breath  CARDIOVASCULAR: No chest pain, palpitations, passing out, dizziness, or leg swelling  GASTROINTESTINAL: No abdominal or epigastric pain. No nausea, vomiting, or hematemesis; No diarrhea or constipation. No melena or hematochezia.  GENITOURINARY: No dysuria, frequency, hematuria, or incontinence  NEUROLOGICAL: No headaches, memory loss, loss of strength, numbness, or tremors  SKIN: No itching, burning, rashes, or lesions

## 2024-03-18 NOTE — CONSULT NOTE ADULT - REASON FOR ADMISSION
Symptomatic anemia 2/2 sickle cell diease and sickle cell crisis
Symptomatic anemia 2/2 sickle cell diease and sickle cell crisis

## 2024-03-18 NOTE — ED PROVIDER NOTE - CPE EDP GASTRO NORM
Quality 110: Preventive Care And Screening: Influenza Immunization: Influenza Immunization previously received during influenza season Detail Level: Detailed Quality 111:Pneumonia Vaccination Status For Older Adults: Pneumococcal Vaccination Previously Received normal...

## 2024-03-18 NOTE — CHART NOTE - NSCHARTNOTEFT_GEN_A_CORE
43 year old male from home, ambulates independently, with PMH of gout and sickle cell diease with chronic anemia and baseline Hb of 6 who presented to the ED generalized pain.  Patient states he has been having increased weakness and pain for the past 5 days.  Patient states this feels like how he normally feels when he has his sickle cell anemia pain.  Patient states his hematologist Dr. Jordan told him his blood levels were low and he should come to the hospital for a blood transfusion.   Patient states he has diffuse body pain, worse in shoulders hips, back and legs.      Pt is now pending MRCP, screening form sent and placed in chart.     #Acute on Chronic Anemia  in setting of sickle cell disease   s/p 2 units prbc  f/u repeat cbc 5pm  hematology labs ordered - f/u cea, gi 19-9    #sickle cell disease  Pain management following    #Transaminitis  f/u MRCP   f/u procalc, blood cultures     #MATA  possibly due to hypovolemia  c/w blood transfusion x2 units  c/w NS 80 cc/hr x12 hours   f/u BMP 43 year old male from home, ambulates independently, with PMH of gout and sickle cell diease with chronic anemia and baseline Hb of 6 who presented to the ED generalized pain.  Patient states he has been having increased weakness and pain for the past 5 days.  Patient states this feels like how he normally feels when he has his sickle cell anemia pain.  Patient states his hematologist Dr. Jordan told him his blood levels were low and he should come to the hospital for a blood transfusion.   Patient states he has diffuse body pain, worse in shoulders hips, back and legs.      Pt is now pending MRCP, screening form sent and placed in chart.     #Acute on Chronic Anemia  in setting of sickle cell disease   s/p 2 units prbc  f/u repeat cbc 5pm  hematology labs ordered - f/u cea, gi 19-9    #sickle cell disease  Pain management following    #Transaminitis  f/u MRCP   f/u procalc, blood cultures     #MATA  possibly due to hypovolemia  c/w blood transfusion x2 units  c/w NS 80 cc/hr x12 hours   f/u BMP  Avoid Nephrotoxic Meds/ Agents such as (NSAIDs, IV contrast, Aminoglycosides such as gentamicin, Gadolinium contrast, Phosphate containing enemas, etc..)

## 2024-03-18 NOTE — H&P ADULT - REASON FOR ADMISSION
Symptomatic anemia 2/2 sickle cell diease Symptomatic anemia 2/2 sickle cell diease and sickle cell crisis

## 2024-03-18 NOTE — H&P ADULT - PROBLEM SELECTOR PLAN 1
- History of sickle cell disease with anemia  - Hgb 5.6 on admission  - Hemodynamically stable and afebrile  - 2U PRBC in ordered in ED  - F/U post transfusion CBC  - Transfuse as indicated to maintain Hgb above 7  - C/w Dilaudid 2mg q3 hours for severe pain  - C/w Dilaudid 1mg q3 hours for moderate pain  - Pain management consulted via sunrise - History of sickle cell disease with anemia  - Hgb 5.6 on admission  - Hemodynamically stable and afebrile  - 2U PRBC in ordered in ED  - F/U post transfusion CBC  - Transfuse as indicated to maintain Hgb above 7

## 2024-03-18 NOTE — H&P ADULT - PROBLEM SELECTOR PLAN 2
- as above - as above  - F/U Chest X-Ray to asses for acute chest, less likely given no chest pain or difficulty breathing  - Continue Dilaudid 2mg q3 hours for severe pain  - Continue  Dilaudid 1mg q3 hours for moderate pain  - Pain management consulted via sunrise

## 2024-03-18 NOTE — CONSULT NOTE ADULT - ASSESSMENT
43 year old male with SCD.  Baseline Hb 5-6. He has been feeling tired, nauseous,in the last few days.  No fever, chills, abd pain.  But has pain at low back and legs.  He has elevated direct logan and RP lymphadenopathy.  In repeat scan, they are smaller.      1. sickle cell present with pain, fatigue  will transfuse 2 u    2 nauseous, feeling unusually tired.  do blood culture, check procal    3. direct bilirubinemia and RP lymph nodes  will repeat MRCP  check CEA, ca19.9    4. MATA  will hydrate  f/u closely.  
Search Terms: Alex Downey, 1980Search Date: 03/18/2024 09:11:26 AM  The Drug Utilization Report below displays all of the controlled substance prescriptions, if any, that your patient has filled in the last twelve months. The information displayed on this report is compiled from pharmacy submissions to the Department, and accurately reflects the information as submitted by the pharmacies.    This report was requested by: Meg Wilhelm | Reference #: [id number]    Practitioner Count: 1  Pharmacy Count: 1  Current Opioid Prescriptions: 1  Current Benzodiazepine Prescriptions: 0  Current Stimulant Prescriptions: 0      Patient Demographic Information (PDI)       PDI	First Name	Last Name	Birth Date	Gender	Street Address	City	State	Zip Code  A	Alex Downey	1980	Male	[phone]	[place] [id number]    Prescription Information      PDI Filter:    PDI	My Rx	Current Rx	Drug Type	Rx Written	Rx Dispensed	Drug	Quantity	Days Supply	Prescriber Name	Prescriber NANCY #	Payment Method	Dispenser  A	N	Y	O	02/08/2024	02/20/2024	oxycodone hcl (ir) 30 mg tab	180	30	Jordan, Shirley SOLITARIO	[id number]	Medicaid	[place]   A	N	N	O	01/11/2024	01/16/2024	oxycodone hcl (ir) 30 mg tab	180	30	Jordan, Shirley SOLITARIO	[id number]	Medicaid	[place]   A	N	N	O	12/14/2023	12/18/2023	oxycodone hcl (ir) 30 mg tab	180	30	Jordan, Shirley SOLITARIO	[id number]	Medicaid	[place]   A	N	N	O	11/16/2023	11/17/2023	oxycodone hcl (ir) 30 mg tab	180	30	Jordan, Shirley SOLITARIO	[id number]	Medicaid	[place]   A	N	N	O	10/18/2023	10/20/2023	oxycodone hcl (ir) 30 mg tab	180	30	Jag Ayala	[id number]	Medicaid	[place]   A	N	N	O	09/14/2023	09/15/2023	oxycodone hcl (ir) 30 mg tab	180	45	Jordan, Shirley SOLITARIO	[id number]	Medicaid	[place]   A	N	N	O	08/17/2023	08/18/2023	oxycodone hcl (ir) 30 mg tab	180	30	Ambrose Laney	[id number]	Medicaid	[place]   A	N	N	O	07/20/2023	07/21/2023	oxycodone hcl (ir) 30 mg tab	180	30	[place]	[id number]	Medicaid	[place]   A	N	N	O	06/22/2023	06/23/2023	oxycodone hcl (ir) 30 mg tab	180	30	Jordan, Shirley SOLITARIO	[id number]	Medicaid	[place]   A	N	N	O	04/20/2023	04/25/2023	oxycodone hcl (ir) 30 mg tab	180	30	Jordan, Shirley SOLITARIO	[id number]	Medicaid	[place]   A	N	N	O	03/23/2023	03/27/2023	oxycodone hcl (ir) 30 mg tab	180	30	Jordan, Shirley SOLITARIO	[id number]	[facility]	[place]     * - Details of Drug Type : O = Opioid, B = Benzodiazepine, S = Stimulant

## 2024-03-18 NOTE — ED PROVIDER NOTE - OBJECTIVE STATEMENT
Patient with generalized weakness and diffuse pain for the past 5 to 6 days.  Patient states symptoms consistent with his sickle cell anemia.  Patient does not endorse chest pain or fever on my evaluation.  No reported priapism.  Patient has right port cath.  Patient states he was informed by his hematologist Dr. Jordan that his hemoglobin was low and requires blood transfusion admission.

## 2024-03-18 NOTE — CONSULT NOTE ADULT - PROBLEM SELECTOR RECOMMENDATION 2
Mild pain (score 1-3)  - Non-pharmacological pain treatment recommendations  - Warm/ Cool packs PRN   - Repositioning extremity, elevation, imagery, relaxation, distraction.  - Physical therapy OOB if no contraindications   Recommendations discussed with primary team and RN

## 2024-03-18 NOTE — H&P ADULT - HISTORY OF PRESENT ILLNESS
Patient is a 43 year old male from home, ambulates independently, with PMH of gout and sickle cell diease with chronic anemia and baseline Hb of 6 who presented to the ED generalized pain.  Patient states he has been having increased weakness and pain for the past 5 days.  Patient states this feels like how he normally feels when he has his sickle cell anemia pain.  Patient states his hematologist Dr. Jordan told him his blood levels were low and he should come to the hospital for a blood transfusion.      Patient denies nausea, vomiting, headache, blurry vision, dizziness, chest pain, abdominal pain, constipation, diarrhea, leg swelling.  Patient is a 43 year old male from home, ambulates independently, with PMH of gout and sickle cell diease with chronic anemia and baseline Hb of 6 who presented to the ED generalized pain.  Patient states he has been having increased weakness and pain for the past 5 days.  Patient states this feels like how he normally feels when he has his sickle cell anemia pain.  Patient states his hematologist Dr. Jordan told him his blood levels were low and he should come to the hospital for a blood transfusion.   Patient states he has diffuse body pain, worse in shoulders hips, back and legs.  Patient denies chest pain or difficulty breathing.  Patient states he has been taking his pain medication as prescribed and until today his pain has been tolerable.  Patient denies nausea, vomiting, headache, blurry vision, dizziness, chest pain, abdominal pain, constipation, diarrhea, leg swelling.

## 2024-03-18 NOTE — H&P ADULT - PROBLEM SELECTOR PLAN 5
- History of Gout  - Patient on allopurinol and colchicine at home  - Continue home gout meds - History of Gout  - Patient on allopurinol and colchicine at home  - HOLD home gout meds

## 2024-03-18 NOTE — CONSULT NOTE ADULT - SUBJECTIVE AND OBJECTIVE BOX
Patient is a 43y old  Male who presents with a chief complaint of Symptomatic anemia 2/2 sickle cell diease and sickle cell crisis (18 Mar 2024 09:12)      HPI:  Patient is a 43 year old male from home, ambulates independently, with PMH of gout and sickle cell diease with chronic anemia and baseline Hb of 6 who presented to the ED generalized pain.  Patient states he has been having increased weakness and pain for the past 5 days.  Patient states this feels like how he normally feels when he has his sickle cell anemia pain.  Patient states his hematologist Dr. Jordan told him his blood levels were low and he should come to the hospital for a blood transfusion.   Patient states he has diffuse body pain, worse in shoulders hips, back and legs.  Patient denies chest pain or difficulty breathing.  Patient states he has been taking his pain medication as prescribed and until today his pain has been tolerable.  Patient denies nausea, vomiting, headache, blurry vision, dizziness, chest pain, abdominal pain, constipation, diarrhea, leg swelling.  (18 Mar 2024 04:04)       ROS:  Negative except for:    PAST MEDICAL & SURGICAL HISTORY:  Sickle cell anemia      Gout      History of cholecystectomy          SOCIAL HISTORY:    FAMILY HISTORY:  Family history of sickle cell trait (Father, Mother)    Patient's father is  (Father)    Patient's mother is  (Mother)        MEDICATIONS  (STANDING):  folic acid 1 milliGRAM(s) Oral every 24 hours  heparin   Injectable 5000 Unit(s) SubCutaneous every 8 hours  lidocaine   4% Patch 2 Patch Transdermal daily  ondansetron Injectable 4 milliGRAM(s) IV Push once  pantoprazole    Tablet 40 milliGRAM(s) Oral before breakfast  senna 2 Tablet(s) Oral at bedtime  sodium chloride 0.9%. 1000 milliLiter(s) (80 mL/Hr) IV Continuous <Continuous>    MEDICATIONS  (PRN):  acetaminophen     Tablet .. 650 milliGRAM(s) Oral every 6 hours PRN Temp greater or equal to 38C (100.4F), Mild Pain (1 - 3)  HYDROmorphone  Injectable 2 milliGRAM(s) IV Push every 3 hours PRN Severe Pain (7 - 10)      Allergies    penicillin (Pruritus)  hydroxyurea (Other)  piperacillin-tazobactam (Urticaria)  ceftriaxone (Anaphylaxis)    Intolerances        Vital Signs Last 24 Hrs  T(C): 36.8 (18 Mar 2024 07:28), Max: 37 (17 Mar 2024 23:33)  T(F): 98.2 (18 Mar 2024 07:28), Max: 98.6 (17 Mar 2024 23:33)  HR: 92 (18 Mar 2024 07:28) (87 - 105)  BP: 146/84 (18 Mar 2024 07:28) (137/88 - 162/78)  BP(mean): --  RR: 17 (18 Mar 2024 07:28) (17 - 18)  SpO2: 94% (18 Mar 2024 07:) (94% - 98%)    Parameters below as of 18 Mar 2024 07:28  Patient On (Oxygen Delivery Method): room air        PHYSICAL EXAM  General: adult in NAD  HEENT: clear oropharynx, anicteric sclera, pink conjunctiva  Neck: supple  CV: normal S1/S2 with no murmur rubs or gallops  Lungs: positive air movement b/l ant lungs,clear to auscultation, no wheezes, no rales  Abdomen: soft non-tender non-distended, no hepatosplenomegaly  Ext: no clubbing cyanosis or edema  Skin: no rashes and no petechiae  Neuro: alert and oriented X 4, no focal deficits      LABS:                          5.8    13.02 )-----------( 293      ( 18 Mar 2024 03:00 )             15.9         Mean Cell Volume : 88.3 fl  Mean Cell Hemoglobin : 32.2 pg  Mean Cell Hemoglobin Concentration : 36.5 gm/dL  Auto Neutrophil # : 7.68 K/uL  Auto Lymphocyte # : 4.56 K/uL  Auto Monocyte # : 0.52 K/uL  Auto Eosinophil # : 0.00 K/uL  Auto Basophil # : 0.00 K/uL  Auto Neutrophil % : 59.0 %  Auto Lymphocyte % : 35.0 %  Auto Monocyte % : 4.0 %  Auto Eosinophil % : 0.0 %  Auto Basophil % : 0.0 %      Serial CBC's   @ 03:00  Hct-15.9 / Hgb-5.8 / Plat-293 / RBC-1.80 / WBC-13.02          131<L>  |  102  |  28<H>  ----------------------------<  102<H>  4.4   |  22  |  1.75<H>    Ca    8.8      18 Mar 2024 03:00    TPro  8.1  /  Alb  3.6  /  TBili  10.8<H>  /  DBili  x   /  AST  194<H>  /  ALT  113<H>  /  AlkPhos  133<H>        PT/INR - ( 18 Mar 2024 03:00 )   PT: 12.4 sec;   INR: 1.09 ratio         PTT - ( 18 Mar 2024 03:00 )  PTT:>200.0 sec    Reticulocyte Percent: 22.4 % ( @ 03:00)              BLOOD SMEAR INTERPRETATION:       RADIOLOGY & ADDITIONAL STUDIES:    
Source of information: TOLU VARGAS, Chart review  Patient language: English  : n/a    HPI:  Patient is a 43 year old male from home, ambulates independently, with PMH of gout and sickle cell diease with chronic anemia and baseline Hb of 6 who presented to the ED generalized pain.  Patient states he has been having increased weakness and pain for the past 5 days.  Patient states this feels like how he normally feels when he has his sickle cell anemia pain.  Patient states his hematologist Dr. Jordan told him his blood levels were low and he should come to the hospital for a blood transfusion.   Patient states he has diffuse body pain, worse in shoulders hips, back and legs.  Patient denies chest pain or difficulty breathing.  Patient states he has been taking his pain medication as prescribed and until today his pain has been tolerable.  Patient denies nausea, vomiting, headache, blurry vision, dizziness, chest pain, abdominal pain, constipation, diarrhea, leg swelling.  (18 Mar 2024 04:04)    Pt is admitted for symptomatic anemia secondary to sickle crisis. On admission H/H (5.8/15.9), retic count 22.4 %. Pain service consulted for management of generalized pain secondary to sickle cell crisis. Patient is well known to the pain service last followed during admission in 10/2023 for similar presentation. Pt seen and examined while laying on ED stretcher this morning. At time of assessment, 1 unit PRBC infusing, patient reports pain score 10/10, SCALE USED: (1-10 VNRS). Pt reports generalized body pain with exacerbation of symptoms to the low back, bilateral hips and rib cage with movement. The pain is described as constant, sharp, and throbbing in quality and is relieved with administration of Dilaudid IVP. Pt tolerating PO diet. Patient reports lethargy. Denies chest pain, SOB, nausea, vomiting, constipation. Reports last BM 3/18. Patient stated goal for pain control: to be able to take deep breaths, get out of bed to chair and ambulate with tolerable pain control. Pt is opioid dependent, reports taking oxycodone 30mg up to four times daily, dose verified on iSTOP. Patient follows Dr. Jordan for outpatient hematology.     PAST MEDICAL & SURGICAL HISTORY:  Sickle cell anemia    Gout    History of cholecystectomy    FAMILY HISTORY:  Family history of sickle cell trait (Father, Mother)    Patient's father is  (Father)    Patient's mother is  (Mother)    Social History:  43 year old male from home. (18 Mar 2024 04:04)    [x] Denies ETOH use, illicit drug use and smoking. Reports occasional marijuana use.     Allergies    penicillin (Pruritus)  hydroxyurea (Other)  piperacillin-tazobactam (Urticaria)  ceftriaxone (Anaphylaxis)    Intolerances    MEDICATIONS  (STANDING):  folic acid 1 milliGRAM(s) Oral every 24 hours  heparin   Injectable 5000 Unit(s) SubCutaneous every 8 hours  lidocaine   4% Patch 2 Patch Transdermal daily  pantoprazole    Tablet 40 milliGRAM(s) Oral before breakfast  sodium chloride 0.9%. 1000 milliLiter(s) (80 mL/Hr) IV Continuous <Continuous>    MEDICATIONS  (PRN):  acetaminophen     Tablet .. 650 milliGRAM(s) Oral every 6 hours PRN Temp greater or equal to 38C (100.4F), Mild Pain (1 - 3)  HYDROmorphone  Injectable 2 milliGRAM(s) IV Push every 3 hours PRN Severe Pain (7 - 10)    Vital Signs Last 24 Hrs  T(C): 36.8 (18 Mar 2024 07:28), Max: 37 (17 Mar 2024 23:33)  T(F): 98.2 (18 Mar 2024 07:28), Max: 98.6 (17 Mar 2024 23:33)  HR: 92 (18 Mar 2024 07:28) (87 - 105)  BP: 146/84 (18 Mar 2024 07:28) (137/88 - 162/78)  BP(mean): --  RR: 17 (18 Mar 2024 07:28) (17 - 18)  SpO2: 94% (18 Mar 2024 07:28) (94% - 98%)    Parameters below as of 18 Mar 2024 07:28  Patient On (Oxygen Delivery Method): room air    LABS: Reviewed.                  5.8    13.02 )-----------( 293      ( 18 Mar 2024 03:00 )             15.9     03-18    131<L>  |  102  |  28<H>  ----------------------------<  102<H>  4.4   |  22  |  1.75<H>    Ca    8.8      18 Mar 2024 03:00    TPro  8.1  /  Alb  3.6  /  TBili  10.8<H>  /  DBili  x   /  AST  194<H>  /  ALT  113<H>  /  AlkPhos  133<H>      PT/INR - ( 18 Mar 2024 03:00 )   PT: 12.4 sec;   INR: 1.09 ratio         PTT - ( 18 Mar 2024 03:00 )  PTT:>200.0 sec  LIVER FUNCTIONS - ( 18 Mar 2024 03:00 )  Alb: 3.6 g/dL / Pro: 8.1 g/dL / ALK PHOS: 133 U/L / ALT: 113 U/L DA / AST: 194 U/L / GGT: x           Urinalysis Basic - ( 18 Mar 2024 03:00 )    Color: x / Appearance: x / SG: x / pH: x  Gluc: 102 mg/dL / Ketone: x  / Bili: x / Urobili: x   Blood: x / Protein: x / Nitrite: x   Leuk Esterase: x / RBC: x / WBC x   Sq Epi: x / Non Sq Epi: x / Bacteria: x    CAPILLARY BLOOD GLUCOSE    SARS-CoV-2: NotDetec (15 Dec 2023 09:57)  SARS-CoV-2: NotDetec (13 Oct 2023 00:57)    Radiology: None to Review.     ORT Score -   Family Hx of substance abuse	Female	      Male  Alcohol 	                                           1                     3  Illegal drugs	                                   2                     3  Rx drugs                                           4 	                  4  Personal Hx of substance abuse		  Alcohol 	                                          3	                  3  Illegal drugs                                     4	                  4  Rx drugs                                            5 	                  5  Age between 16- 45 years	           1                     1  hx preadolescent sexual abuse	   3 	                  0  Psychological disease		  ADD, OCD, bipolar, schizophrenia   2	          2  Depression                                           1 	          1  Total: 0    a score of 3 or lower indicates low risk for opioid abuse		  a score of 4-7 indicates moderate risk for opioid abuse		  a score of 8 or higher indicates high risk for opioid abuse  	  REVIEW OF SYSTEMS:  CONSTITUTIONAL: + fatigue  HEENT:  No difficulty hearing, no change in vision  NECK: No pain or stiffness  RESPIRATORY: No cough, wheezing, chills or hemoptysis; No shortness of breath  CARDIOVASCULAR: No chest pain, palpitations, dizziness, or leg swelling  GASTROINTESTINAL: No loss of appetite, decreased PO intake. No abdominal or epigastric pain. No nausea, vomiting; No diarrhea or constipation.   GENITOURINARY: No dysuria, frequency, hematuria, retention or incontinence  MUSCULOSKELETAL: + generalized joint pain; + low back pain, no upper or lower motor strength weakness, no saddle anesthesia, bowel/bladder incontinence, no falls   NEURO: No headaches, No numbness/tingling b/l LE  ENDOCRINE: No polyuria, polydipsia, heat or cold intolerance; No hair loss  PSYCHIATRIC: No depression, anxiety or difficulty sleeping    PHYSICAL EXAM:  GENERAL:  Alert & Oriented X4, cooperative, NAD, Speech is clear.   HEENT:  + scleral icterus   RESPIRATORY: Respirations even and unlabored. Clear to auscultation bilaterally  CARDIOVASCULAR: Normal S1/S2, regular rate and rhythm  GASTROINTESTINAL:  Soft, Nontender, Nondistended; Bowel sounds present  PERIPHERAL VASCULAR:  Extremities warm without edema. 2+ Peripheral Pulses, No cyanosis, No calf tenderness  MUSCULOSKELETAL: Motor Strength 5/5 B/L upper and lower extremities; moves all extremities equally against gravity; ROM intact; negative SLR; + generalized joint tenderness  SKIN: Warm, dry, intact    Risk factors associated with adverse outcomes related to opioid treatment  [ ] Concurrent benzodiazepine use  [ ] History/ Active substance use or alcohol use disorder  [ ] Psychiatric co-morbidity  [ ] Sleep apnea  [ ] COPD  [ ] BMI> 35  [ ] Liver dysfunction  [ ] Renal dysfunction  [ ] CHF  [ ] Smoker  [ ] Age > 60 years    [x]  [place]  Reviewed and Copied to Chart. See below.    Plan of care and goal oriented pain management treatment options were discussed with patient and /or primary care giver; all questions and concerns were addressed and care was aligned with patient's wishes.    Educated patient on goal oriented pain management treatment options

## 2024-03-18 NOTE — CONSULT NOTE ADULT - PROBLEM SELECTOR RECOMMENDATION 9
Pt with chronic generalized joint pain which is somatic in nature due to sickle cell crisis. On admission, H/H (5.8/15.9), retic count 22.4 %, patient receiving 1 unit PRBCs during assessment. Patient is known to the pain service and was followed in 10/23 for similar presentation. Pt is opioid dependent, reports taking oxycodone 30mg up to four times daily, dose verified on iSTOP. Patient follows Dr. Jordan for outpatient hematology. + scleral icterus, MRCP pending.  Opioid pain recommendations   - Modify Dilaudid to 2mg IVP q 3hrs PRN for severe pain. Monitor for sedation and respiratory depression.   Non-opioid pain recommendations   - No NSAIDs  - No Acetaminohpen due to transaminitis  - Start Lidoderm 4% patch daily x 2. (12 hrs on/12 hrs off)  Bowel Regimen  - Continue Miralax 17G PO daily  - Continue Senna 2 tablets at bedtime for constipation

## 2024-03-18 NOTE — H&P ADULT - ATTENDING COMMENTS
Vital Signs Last 24 Hrs  T(C): 36.8 (18 Mar 2024 04:12), Max: 37 (17 Mar 2024 23:33)  T(F): 98.2 (18 Mar 2024 04:12), Max: 98.6 (17 Mar 2024 23:33)  HR: 87 (18 Mar 2024 04:12) (87 - 105)  BP: 137/88 (18 Mar 2024 04:12) (137/88 - 162/78)  RR: 18 (18 Mar 2024 04:12) (18 - 18)  SpO2: 94% (18 Mar 2024 04:12) (94% - 98%)  Parameters below as of 18 Mar 2024 04:12  Patient On (Oxygen Delivery Method): room air    Labs   wbc 13k with granulocytosis  hgb 5.8 ( baseline about 7)   Increased reticulocytosis   ?? aPTT > 200    na 131  BUN 28  Cr - 1.75  TB 10.8    AST/ALT - 194/113  Lipase 111    Impression   43 year old man with hx of HbSS - sickle cell anemia followed in [facility] by Dr Jordan presenting with clinical and lab features concerning for a painful hemolytic crisis with symptomatic anemia.    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

## 2024-03-18 NOTE — H&P ADULT - NSHPPHYSICALEXAM_GEN_ALL_CORE
T(C): 37 (03-17-24 @ 23:33), Max: 37 (03-17-24 @ 23:33)  T(F): 98.6 (03-17-24 @ 23:33), Max: 98.6 (03-17-24 @ 23:33)  HR: 105 (03-17-24 @ 23:33) (105 - 105)  BP: 162/78 (03-17-24 @ 23:33) (162/78 - 162/78)  RR: 18 (03-17-24 @ 23:33) (18 - 18)  SpO2: 98% (03-17-24 @ 23:33) (98% - 98%)    GENERAL: NAD; lying in bed  HEAD:  Atraumatic, Normocephalic  EYES: EOMI, PERRLA, conjunctiva and sclera clear  ENMT: No tonsillar erythema  NECK: Supple, normal appearance, No JVD  NERVOUS SYSTEM:  Alert & Oriented X3,  Motor Strength 5/5 B/L upper and lower extremities, sensation intact  CHEST/LUNG: Lungs clear to auscultation bilaterally, No rales, rhonchi, wheezing   HEART: Regular rate and rhythm; No murmurs, rubs, or gallops  ABDOMEN: Soft, Nontender, Nondistended; Bowel sounds present  EXTREMITIES:  2+ Peripheral Pulses, No clubbing, cyanosis, or edema  SKIN: Jaundiced T(C): 37 (03-17-24 @ 23:33), Max: 37 (03-17-24 @ 23:33)  T(F): 98.6 (03-17-24 @ 23:33), Max: 98.6 (03-17-24 @ 23:33)  HR: 105 (03-17-24 @ 23:33) (105 - 105)  BP: 162/78 (03-17-24 @ 23:33) (162/78 - 162/78)  RR: 18 (03-17-24 @ 23:33) (18 - 18)  SpO2: 98% (03-17-24 @ 23:33) (98% - 98%)    GENERAL: NAD; lying in bed  HEAD:  Atraumatic, Normocephalic  EYES: EOMI, PERRLA, scleral icterus   ENMT: No tonsillar erythema  NECK: Supple, normal appearance, No JVD  NERVOUS SYSTEM:  Alert & Oriented X3,  Motor Strength 5/5 B/L upper and lower extremities, sensation intact  CHEST/LUNG: Lungs clear to auscultation bilaterally, No rales, rhonchi, wheezing   HEART: Regular rate and rhythm; No murmurs, rubs, or gallops  ABDOMEN: Soft, Nontender, Nondistended; Bowel sounds present  EXTREMITIES:  2+ Peripheral Pulses, No clubbing, cyanosis, or edema  SKIN: Jaundiced

## 2024-03-18 NOTE — ED PROVIDER NOTE - CLINICAL SUMMARY MEDICAL DECISION MAKING FREE TEXT BOX
H&H is 5.8/15.  Patient requires blood transfusion, IV fluids and analgesia.  Medical admitting resident endorsed.  Patient be admitted to hospital service.  I had a detailed discussion with the patient and/or guardian regarding the historical points, exam findings, and any diagnostic results supporting the admit diagnosis.

## 2024-03-19 LAB
-  STAPHYLOCOCCUS EPIDERMIDIS: SIGNIFICANT CHANGE UP
ALBUMIN SERPL ELPH-MCNC: 3 G/DL — LOW (ref 3.5–5)
ALP SERPL-CCNC: 130 U/L — HIGH (ref 40–120)
ALT FLD-CCNC: 89 U/L DA — HIGH (ref 10–60)
ANION GAP SERPL CALC-SCNC: 2 MMOL/L — LOW (ref 5–17)
AST SERPL-CCNC: 158 U/L — HIGH (ref 10–40)
BILIRUB SERPL-MCNC: 9.6 MG/DL — HIGH (ref 0.2–1.2)
BUN SERPL-MCNC: 25 MG/DL — HIGH (ref 7–18)
CALCIUM SERPL-MCNC: 8.8 MG/DL — SIGNIFICANT CHANGE UP (ref 8.4–10.5)
CANCER AG19-9 SERPL-ACNC: 21 U/ML — SIGNIFICANT CHANGE UP
CEA SERPL-MCNC: 2 NG/ML — SIGNIFICANT CHANGE UP (ref 0–3.8)
CHLORIDE SERPL-SCNC: 111 MMOL/L — HIGH (ref 96–108)
CO2 SERPL-SCNC: 24 MMOL/L — SIGNIFICANT CHANGE UP (ref 22–31)
CREAT SERPL-MCNC: 1.45 MG/DL — HIGH (ref 0.5–1.3)
EGFR: 61 ML/MIN/1.73M2 — SIGNIFICANT CHANGE UP
GLUCOSE SERPL-MCNC: 115 MG/DL — HIGH (ref 70–99)
GRAM STN FLD: ABNORMAL
HCT VFR BLD CALC: 22.6 % — LOW (ref 39–50)
HGB BLD-MCNC: 8.1 G/DL — LOW (ref 13–17)
MCHC RBC-ENTMCNC: 32.9 PG — SIGNIFICANT CHANGE UP (ref 27–34)
MCHC RBC-ENTMCNC: 35.8 GM/DL — SIGNIFICANT CHANGE UP (ref 32–36)
MCV RBC AUTO: 91.9 FL — SIGNIFICANT CHANGE UP (ref 80–100)
METHOD TYPE: SIGNIFICANT CHANGE UP
NRBC # BLD: 2 /100 WBCS — HIGH (ref 0–0)
PLATELET # BLD AUTO: 269 K/UL — SIGNIFICANT CHANGE UP (ref 150–400)
POTASSIUM SERPL-MCNC: 4.7 MMOL/L — SIGNIFICANT CHANGE UP (ref 3.5–5.3)
POTASSIUM SERPL-SCNC: 4.7 MMOL/L — SIGNIFICANT CHANGE UP (ref 3.5–5.3)
PROCALCITONIN SERPL-MCNC: 0.07 NG/ML — SIGNIFICANT CHANGE UP (ref 0.02–0.1)
PROT SERPL-MCNC: 7.1 G/DL — SIGNIFICANT CHANGE UP (ref 6–8.3)
RBC # BLD: 2.46 M/UL — LOW (ref 4.2–5.8)
RBC # FLD: 20.7 % — HIGH (ref 10.3–14.5)
SODIUM SERPL-SCNC: 137 MMOL/L — SIGNIFICANT CHANGE UP (ref 135–145)
SPECIMEN SOURCE: SIGNIFICANT CHANGE UP
WBC # BLD: 8.96 K/UL — SIGNIFICANT CHANGE UP (ref 3.8–10.5)
WBC # FLD AUTO: 8.96 K/UL — SIGNIFICANT CHANGE UP (ref 3.8–10.5)

## 2024-03-19 PROCEDURE — 76857 US EXAM PELVIC LIMITED: CPT | Mod: 26,59

## 2024-03-19 PROCEDURE — 99232 SBSQ HOSP IP/OBS MODERATE 35: CPT

## 2024-03-19 PROCEDURE — 76775 US EXAM ABDO BACK WALL LIM: CPT | Mod: 26

## 2024-03-19 RX ORDER — HYDROMORPHONE HYDROCHLORIDE 2 MG/ML
0.5 INJECTION INTRAMUSCULAR; INTRAVENOUS; SUBCUTANEOUS ONCE
Refills: 0 | Status: DISCONTINUED | OUTPATIENT
Start: 2024-03-19 | End: 2024-03-19

## 2024-03-19 RX ORDER — HYDROMORPHONE HYDROCHLORIDE 2 MG/ML
2 INJECTION INTRAMUSCULAR; INTRAVENOUS; SUBCUTANEOUS
Refills: 0 | Status: DISCONTINUED | OUTPATIENT
Start: 2024-03-19 | End: 2024-03-20

## 2024-03-19 RX ORDER — SODIUM CHLORIDE 9 MG/ML
1000 INJECTION INTRAMUSCULAR; INTRAVENOUS; SUBCUTANEOUS
Refills: 0 | Status: DISCONTINUED | OUTPATIENT
Start: 2024-03-19 | End: 2024-03-19

## 2024-03-19 RX ADMIN — HYDROMORPHONE HYDROCHLORIDE 2 MILLIGRAM(S): 2 INJECTION INTRAMUSCULAR; INTRAVENOUS; SUBCUTANEOUS at 22:37

## 2024-03-19 RX ADMIN — HYDROMORPHONE HYDROCHLORIDE 2 MILLIGRAM(S): 2 INJECTION INTRAMUSCULAR; INTRAVENOUS; SUBCUTANEOUS at 06:00

## 2024-03-19 RX ADMIN — Medication 1 MILLIGRAM(S): at 05:30

## 2024-03-19 RX ADMIN — HYDROMORPHONE HYDROCHLORIDE 2 MILLIGRAM(S): 2 INJECTION INTRAMUSCULAR; INTRAVENOUS; SUBCUTANEOUS at 10:58

## 2024-03-19 RX ADMIN — LIDOCAINE 2 PATCH: 4 CREAM TOPICAL at 20:27

## 2024-03-19 RX ADMIN — HYDROMORPHONE HYDROCHLORIDE 2 MILLIGRAM(S): 2 INJECTION INTRAMUSCULAR; INTRAVENOUS; SUBCUTANEOUS at 12:16

## 2024-03-19 RX ADMIN — HYDROMORPHONE HYDROCHLORIDE 2 MILLIGRAM(S): 2 INJECTION INTRAMUSCULAR; INTRAVENOUS; SUBCUTANEOUS at 19:03

## 2024-03-19 RX ADMIN — SENNA PLUS 2 TABLET(S): 8.6 TABLET ORAL at 22:07

## 2024-03-19 RX ADMIN — HYDROMORPHONE HYDROCHLORIDE 2 MILLIGRAM(S): 2 INJECTION INTRAMUSCULAR; INTRAVENOUS; SUBCUTANEOUS at 03:04

## 2024-03-19 RX ADMIN — HYDROMORPHONE HYDROCHLORIDE 2 MILLIGRAM(S): 2 INJECTION INTRAMUSCULAR; INTRAVENOUS; SUBCUTANEOUS at 13:00

## 2024-03-19 RX ADMIN — HYDROMORPHONE HYDROCHLORIDE 2 MILLIGRAM(S): 2 INJECTION INTRAMUSCULAR; INTRAVENOUS; SUBCUTANEOUS at 09:11

## 2024-03-19 RX ADMIN — LIDOCAINE 2 PATCH: 4 CREAM TOPICAL at 12:06

## 2024-03-19 RX ADMIN — HYDROMORPHONE HYDROCHLORIDE 0.5 MILLIGRAM(S): 2 INJECTION INTRAMUSCULAR; INTRAVENOUS; SUBCUTANEOUS at 11:42

## 2024-03-19 RX ADMIN — PANTOPRAZOLE SODIUM 40 MILLIGRAM(S): 20 TABLET, DELAYED RELEASE ORAL at 05:30

## 2024-03-19 RX ADMIN — HYDROMORPHONE HYDROCHLORIDE 2 MILLIGRAM(S): 2 INJECTION INTRAMUSCULAR; INTRAVENOUS; SUBCUTANEOUS at 03:34

## 2024-03-19 RX ADMIN — HYDROMORPHONE HYDROCHLORIDE 2 MILLIGRAM(S): 2 INJECTION INTRAMUSCULAR; INTRAVENOUS; SUBCUTANEOUS at 15:58

## 2024-03-19 RX ADMIN — HYDROMORPHONE HYDROCHLORIDE 2 MILLIGRAM(S): 2 INJECTION INTRAMUSCULAR; INTRAVENOUS; SUBCUTANEOUS at 06:42

## 2024-03-19 RX ADMIN — HYDROMORPHONE HYDROCHLORIDE 2 MILLIGRAM(S): 2 INJECTION INTRAMUSCULAR; INTRAVENOUS; SUBCUTANEOUS at 16:00

## 2024-03-19 RX ADMIN — HYDROMORPHONE HYDROCHLORIDE 2 MILLIGRAM(S): 2 INJECTION INTRAMUSCULAR; INTRAVENOUS; SUBCUTANEOUS at 22:07

## 2024-03-19 RX ADMIN — HYDROMORPHONE HYDROCHLORIDE 0.5 MILLIGRAM(S): 2 INJECTION INTRAMUSCULAR; INTRAVENOUS; SUBCUTANEOUS at 11:01

## 2024-03-19 RX ADMIN — HYDROMORPHONE HYDROCHLORIDE 2 MILLIGRAM(S): 2 INJECTION INTRAMUSCULAR; INTRAVENOUS; SUBCUTANEOUS at 00:20

## 2024-03-20 ENCOUNTER — TRANSCRIPTION ENCOUNTER (OUTPATIENT)
Age: 44
End: 2024-03-20

## 2024-03-20 VITALS
RESPIRATION RATE: 16 BRPM | HEART RATE: 73 BPM | OXYGEN SATURATION: 97 % | SYSTOLIC BLOOD PRESSURE: 149 MMHG | TEMPERATURE: 98 F | DIASTOLIC BLOOD PRESSURE: 81 MMHG

## 2024-03-20 DIAGNOSIS — R78.81 BACTEREMIA: ICD-10-CM

## 2024-03-20 LAB
ALBUMIN SERPL ELPH-MCNC: 3.3 G/DL — LOW (ref 3.5–5)
ALP SERPL-CCNC: 133 U/L — HIGH (ref 40–120)
ALT FLD-CCNC: 87 U/L DA — HIGH (ref 10–60)
ANION GAP SERPL CALC-SCNC: 5 MMOL/L — SIGNIFICANT CHANGE UP (ref 5–17)
AST SERPL-CCNC: 165 U/L — HIGH (ref 10–40)
BILIRUB SERPL-MCNC: 9.8 MG/DL — HIGH (ref 0.2–1.2)
BUN SERPL-MCNC: 27 MG/DL — HIGH (ref 7–18)
CALCIUM SERPL-MCNC: 9.4 MG/DL — SIGNIFICANT CHANGE UP (ref 8.4–10.5)
CHLORIDE SERPL-SCNC: 111 MMOL/L — HIGH (ref 96–108)
CO2 SERPL-SCNC: 19 MMOL/L — LOW (ref 22–31)
CREAT SERPL-MCNC: 1.22 MG/DL — SIGNIFICANT CHANGE UP (ref 0.5–1.3)
CULTURE RESULTS: ABNORMAL
EGFR: 75 ML/MIN/1.73M2 — SIGNIFICANT CHANGE UP
GLUCOSE SERPL-MCNC: 90 MG/DL — SIGNIFICANT CHANGE UP (ref 70–99)
GRAM STN FLD: ABNORMAL
HCT VFR BLD CALC: 21.9 % — LOW (ref 39–50)
HGB BLD-MCNC: 8 G/DL — LOW (ref 13–17)
MCHC RBC-ENTMCNC: 33.2 PG — SIGNIFICANT CHANGE UP (ref 27–34)
MCHC RBC-ENTMCNC: 36.5 GM/DL — HIGH (ref 32–36)
MCV RBC AUTO: 90.9 FL — SIGNIFICANT CHANGE UP (ref 80–100)
NRBC # BLD: 0 /100 WBCS — SIGNIFICANT CHANGE UP (ref 0–0)
ORGANISM # SPEC MICROSCOPIC CNT: ABNORMAL
ORGANISM # SPEC MICROSCOPIC CNT: ABNORMAL
PLATELET # BLD AUTO: 257 K/UL — SIGNIFICANT CHANGE UP (ref 150–400)
POTASSIUM SERPL-MCNC: 5 MMOL/L — SIGNIFICANT CHANGE UP (ref 3.5–5.3)
POTASSIUM SERPL-SCNC: 5 MMOL/L — SIGNIFICANT CHANGE UP (ref 3.5–5.3)
PROT SERPL-MCNC: 7.9 G/DL — SIGNIFICANT CHANGE UP (ref 6–8.3)
RBC # BLD: 2.41 M/UL — LOW (ref 4.2–5.8)
RBC # FLD: 21.5 % — HIGH (ref 10.3–14.5)
SODIUM SERPL-SCNC: 135 MMOL/L — SIGNIFICANT CHANGE UP (ref 135–145)
SPECIMEN SOURCE: SIGNIFICANT CHANGE UP
URATE SERPL-MCNC: 9 MG/DL — HIGH (ref 3.4–8.8)
WBC # BLD: 8.48 K/UL — SIGNIFICANT CHANGE UP (ref 3.8–10.5)
WBC # FLD AUTO: 8.48 K/UL — SIGNIFICANT CHANGE UP (ref 3.8–10.5)

## 2024-03-20 PROCEDURE — P9040: CPT

## 2024-03-20 PROCEDURE — 96374 THER/PROPH/DIAG INJ IV PUSH: CPT

## 2024-03-20 PROCEDURE — 84145 PROCALCITONIN (PCT): CPT

## 2024-03-20 PROCEDURE — 87040 BLOOD CULTURE FOR BACTERIA: CPT

## 2024-03-20 PROCEDURE — 87077 CULTURE AEROBIC IDENTIFY: CPT

## 2024-03-20 PROCEDURE — 83735 ASSAY OF MAGNESIUM: CPT

## 2024-03-20 PROCEDURE — 99239 HOSP IP/OBS DSCHRG MGMT >30: CPT

## 2024-03-20 PROCEDURE — 99285 EMERGENCY DEPT VISIT HI MDM: CPT | Mod: 25

## 2024-03-20 PROCEDURE — 86301 IMMUNOASSAY TUMOR CA 19-9: CPT

## 2024-03-20 PROCEDURE — 99232 SBSQ HOSP IP/OBS MODERATE 35: CPT

## 2024-03-20 PROCEDURE — 85045 AUTOMATED RETICULOCYTE COUNT: CPT

## 2024-03-20 PROCEDURE — 86900 BLOOD TYPING SEROLOGIC ABO: CPT

## 2024-03-20 PROCEDURE — 96376 TX/PRO/DX INJ SAME DRUG ADON: CPT

## 2024-03-20 PROCEDURE — 84100 ASSAY OF PHOSPHORUS: CPT

## 2024-03-20 PROCEDURE — 36430 TRANSFUSION BLD/BLD COMPNT: CPT

## 2024-03-20 PROCEDURE — 82378 CARCINOEMBRYONIC ANTIGEN: CPT

## 2024-03-20 PROCEDURE — 83690 ASSAY OF LIPASE: CPT

## 2024-03-20 PROCEDURE — 85027 COMPLETE CBC AUTOMATED: CPT

## 2024-03-20 PROCEDURE — 76775 US EXAM ABDO BACK WALL LIM: CPT

## 2024-03-20 PROCEDURE — 85025 COMPLETE CBC W/AUTO DIFF WBC: CPT

## 2024-03-20 PROCEDURE — 86901 BLOOD TYPING SEROLOGIC RH(D): CPT

## 2024-03-20 PROCEDURE — 85730 THROMBOPLASTIN TIME PARTIAL: CPT

## 2024-03-20 PROCEDURE — 86850 RBC ANTIBODY SCREEN: CPT

## 2024-03-20 PROCEDURE — 96375 TX/PRO/DX INJ NEW DRUG ADDON: CPT

## 2024-03-20 PROCEDURE — 74183 MRI ABD W/O CNTR FLWD CNTR: CPT | Mod: MC

## 2024-03-20 PROCEDURE — 85610 PROTHROMBIN TIME: CPT

## 2024-03-20 PROCEDURE — 80053 COMPREHEN METABOLIC PANEL: CPT

## 2024-03-20 PROCEDURE — 76857 US EXAM PELVIC LIMITED: CPT

## 2024-03-20 PROCEDURE — 96372 THER/PROPH/DIAG INJ SC/IM: CPT

## 2024-03-20 PROCEDURE — 36415 COLL VENOUS BLD VENIPUNCTURE: CPT

## 2024-03-20 PROCEDURE — 87150 DNA/RNA AMPLIFIED PROBE: CPT

## 2024-03-20 PROCEDURE — 86922 COMPATIBILITY TEST ANTIGLOB: CPT

## 2024-03-20 PROCEDURE — 71045 X-RAY EXAM CHEST 1 VIEW: CPT

## 2024-03-20 PROCEDURE — A9585: CPT

## 2024-03-20 PROCEDURE — 84550 ASSAY OF BLOOD/URIC ACID: CPT

## 2024-03-20 RX ORDER — ACETAMINOPHEN 500 MG
2 TABLET ORAL
Qty: 0 | Refills: 0 | DISCHARGE
Start: 2024-03-20

## 2024-03-20 RX ORDER — ALLOPURINOL 300 MG
200 TABLET ORAL DAILY
Refills: 0 | Status: DISCONTINUED | OUTPATIENT
Start: 2024-03-20 | End: 2024-03-20

## 2024-03-20 RX ORDER — ALLOPURINOL 300 MG
1 TABLET ORAL
Qty: 0 | Refills: 0 | DISCHARGE
Start: 2024-03-20

## 2024-03-20 RX ORDER — ALLOPURINOL 300 MG
100 TABLET ORAL DAILY
Refills: 0 | Status: DISCONTINUED | OUTPATIENT
Start: 2024-03-20 | End: 2024-03-20

## 2024-03-20 RX ADMIN — HYDROMORPHONE HYDROCHLORIDE 2 MILLIGRAM(S): 2 INJECTION INTRAMUSCULAR; INTRAVENOUS; SUBCUTANEOUS at 11:21

## 2024-03-20 RX ADMIN — HYDROMORPHONE HYDROCHLORIDE 2 MILLIGRAM(S): 2 INJECTION INTRAMUSCULAR; INTRAVENOUS; SUBCUTANEOUS at 08:51

## 2024-03-20 RX ADMIN — HYDROMORPHONE HYDROCHLORIDE 2 MILLIGRAM(S): 2 INJECTION INTRAMUSCULAR; INTRAVENOUS; SUBCUTANEOUS at 14:57

## 2024-03-20 RX ADMIN — Medication 100 UNIT(S): at 15:15

## 2024-03-20 RX ADMIN — HYDROMORPHONE HYDROCHLORIDE 2 MILLIGRAM(S): 2 INJECTION INTRAMUSCULAR; INTRAVENOUS; SUBCUTANEOUS at 02:00

## 2024-03-20 RX ADMIN — LIDOCAINE 2 PATCH: 4 CREAM TOPICAL at 00:45

## 2024-03-20 RX ADMIN — HYDROMORPHONE HYDROCHLORIDE 2 MILLIGRAM(S): 2 INJECTION INTRAMUSCULAR; INTRAVENOUS; SUBCUTANEOUS at 08:21

## 2024-03-20 RX ADMIN — HYDROMORPHONE HYDROCHLORIDE 2 MILLIGRAM(S): 2 INJECTION INTRAMUSCULAR; INTRAVENOUS; SUBCUTANEOUS at 11:51

## 2024-03-20 RX ADMIN — PANTOPRAZOLE SODIUM 40 MILLIGRAM(S): 20 TABLET, DELAYED RELEASE ORAL at 05:55

## 2024-03-20 RX ADMIN — Medication 1 MILLIGRAM(S): at 07:20

## 2024-03-20 RX ADMIN — HYDROMORPHONE HYDROCHLORIDE 2 MILLIGRAM(S): 2 INJECTION INTRAMUSCULAR; INTRAVENOUS; SUBCUTANEOUS at 04:54

## 2024-03-20 RX ADMIN — HYDROMORPHONE HYDROCHLORIDE 2 MILLIGRAM(S): 2 INJECTION INTRAMUSCULAR; INTRAVENOUS; SUBCUTANEOUS at 01:30

## 2024-03-20 RX ADMIN — HYDROMORPHONE HYDROCHLORIDE 2 MILLIGRAM(S): 2 INJECTION INTRAMUSCULAR; INTRAVENOUS; SUBCUTANEOUS at 14:27

## 2024-03-20 RX ADMIN — HYDROMORPHONE HYDROCHLORIDE 2 MILLIGRAM(S): 2 INJECTION INTRAMUSCULAR; INTRAVENOUS; SUBCUTANEOUS at 05:24

## 2024-03-20 RX ADMIN — LIDOCAINE 2 PATCH: 4 CREAM TOPICAL at 11:22

## 2024-03-20 RX ADMIN — Medication 200 MILLIGRAM(S): at 11:34

## 2024-03-20 NOTE — DISCHARGE NOTE PROVIDER - HOSPITAL COURSE
43 year old male from home, ambulates independently, with PMH of gout and sickle cell diease with chronic anemia and baseline Hb of 6 who presented to the ED generalized pain.  In the ED patients hemodynamically stable and afebrile.  Patient with leukocytosis to 13K and normocytic anemia to 5.8.  PTT elevated >200.  Patient also with MATA of 1.75 (baseline <1) and elevated liver enzymes which appear stable since prior admissions.  Patient is being admitted to medicine for symptomatic anemia and sickle cell pain crisis.     Hemodynamically stable and afebrile. Chest X-Ray no acute pathology. s/p 2 units of prbc in the ED. heme/onc follows recc: Maintain above 5 or transfuse if symptomatic.    + blood culture form 3/18- growing gram + cocci in clusters ( staph epi), no WBCs, afebrile, likely contaminated.    Case discussed with attending, pt medically stable for d/c.  Please note that this a brief summary of hospital course please refer to daily progress notes and consult notes for full course and events

## 2024-03-20 NOTE — PROGRESS NOTE ADULT - REASON FOR ADMISSION
Symptomatic anemia 2/2 sickle cell diease and sickle cell crisis

## 2024-03-20 NOTE — PROGRESS NOTE ADULT - PROBLEM SELECTOR PLAN 1
- History of sickle cell disease with anemia  - Hgb 5.6 on admission, baseline hgb 6  - Hemodynamically stable and afebrile  - s/p 2 units of prbc in the ED   - Transfuse as indicated to maintain Hgb above 7  - heme/onc follows recc: Maintain above 5 or transfuse if symptomatic.
Pt with chronic generalized joint pain which is somatic in nature due to sickle cell crisis. On admission, H/H (5.8/15.9), retic count 22.4 %, status post 2 units PRBCs. Patient is known to the pain service and was followed in 10/23 for similar presentation. Pt is opioid dependent, reports taking oxycodone 30mg up to four times daily, dose verified on iSTOP. Patient follows Dr. Jordan for outpatient hematology. + scleral icterus, MRCP demonstrates iron deposition in the liver and kidneys compatible with sickle cell disease, intravascular hemolysis and numerous transfusions. No biliary ductal dilatation or hepatobiliary mass. Stable upper abdominal lymphadenopathy. Renal ultrasound pending.  Opioid pain recommendations   - Continue Dilaudid 2mg IVP q 3hrs PRN for severe pain. Monitor for sedation and respiratory depression.   - Give Dilaudid 0.5mg once STAT (breakthrough dose)  Non-opioid pain recommendations   - No NSAIDs  - No Acetaminohpen due to transaminitis  - Continue Lidoderm 4% patch daily x 2. (12 hrs on/12 hrs off)  Bowel Regimen  - Continue Miralax 17G PO daily  - Continue Senna 2 tablets at bedtime for constipation.
- History of sickle cell disease with anemia  - Hgb 5.6 on admission  - Hemodynamically stable and afebrile  - s/p 2 units of prbc in the ED   - Transfuse as indicated to maintain Hgb above 7
Pt with chronic generalized joint pain which is somatic in nature due to sickle cell crisis. On admission, H/H (5.8/15.9), retic count 22.4 %, status post 2 units PRBCs. Patient is known to the pain service and was followed in 10/23 for similar presentation. Pt is opioid dependent, reports taking oxycodone 30mg up to four times daily, dose verified on iSTOP. Patient follows Dr. Jordan for outpatient hematology. + scleral icterus, MRCP demonstrates iron deposition in the liver and kidneys compatible with sickle cell disease, intravascular hemolysis and numerous transfusions. No biliary ductal dilatation or hepatobiliary mass. Stable upper abdominal lymphadenopathy. Renal ultrasound demonstrates no convincing evidence of hydronephrosis.    Opioid pain recommendations   - Continue Dilaudid 2mg IVP q 3hrs PRN for severe pain. Monitor for sedation and respiratory depression.   - Upon discharge patient can return to home oxycodone regimen.  Non-opioid pain recommendations   - No NSAIDs  - No Acetaminohpen due to transaminitis  - Continue Lidoderm 4% patch daily x 2. (12 hrs on/12 hrs off)  Bowel Regimen  - Continue Miralax 17G PO daily  - Continue Senna 2 tablets at bedtime for constipation.

## 2024-03-20 NOTE — CHART NOTE - NSCHARTNOTEFT_GEN_A_CORE
EVENT:   Called by RN re: Blood culture sent on 3/18/24 showed in aerobic bottle gram (+) cocci in clusters; in anaerobic bottle gram (+) positive cocci in clusters. PCR detected Staphylococcus epidermidis. Patient currently is not on antibiotics. Afebrile. WBC - 8.96. Attending, Jone Tate - sent text message. Morning shift provider will be endorsed.    HPI:       43 year old male from home, ambulates independently, with PMH of gout and sickle cell diease with chronic anemia and baseline Hb of 6 who presented to the ED generalized pain.  Patient states he has been having increased weakness and pain for the past 5 days.  Patient states this feels like how he normally feels when he has his sickle cell anemia pain.  Patient states his hematologist Dr. Jordan told him his blood levels were low and he should come to the hospital for a blood transfusion.   Patient states he has diffuse body pain, worse in shoulders hips, back and legs.  Patient denies chest pain or difficulty breathing.  Patient states he has been taking his pain medication as prescribed and until today his pain has been tolerable.  Patient denies nausea, vomiting, headache, blurry vision, dizziness, chest pain, abdominal pain, constipation, diarrhea, leg swelling.  (18 Mar 2024 04:04)    OBJECTIVE:  Vital Signs Last 24 Hrs  T(C): 36.8 (19 Mar 2024 20:45), Max: 36.8 (19 Mar 2024 20:45)  T(F): 98.2 (19 Mar 2024 20:45), Max: 98.2 (19 Mar 2024 20:45)  HR: 69 (19 Mar 2024 20:45) (63 - 70)  BP: 146/76 (19 Mar 2024 20:45) (145/82 - 149/84)  BP(mean): 98 (19 Mar 2024 13:35) (98 - 98)  RR: 17 (19 Mar 2024 20:45) (16 - 17)  SpO2: 95% (19 Mar 2024 20:45) (94% - 99%)    Parameters below as of 19 Mar 2024 20:45  Patient On (Oxygen Delivery Method): room air        FOCUSED PHYSICAL EXAM:    LABS:                        8.1    8.96  )-----------( 269      ( 19 Mar 2024 05:39 )             22.6     03-19    137  |  111<H>  |  25<H>  ----------------------------<  115<H>  4.7   |  24  |  1.45<H>    Ca    8.8      19 Mar 2024 05:39  Phos  3.8     03-18  Mg     1.9     03-18    TPro  7.1  /  Alb  3.0<L>  /  TBili  9.6<H>  /  DBili  x   /  AST  158<H>  /  ALT  89<H>  /  AlkPhos  130<H>  03-19    PLAN:   - Monitor patient VS,   - Follow-up morning lab. results,  - Continue supportive care and treatment.

## 2024-03-20 NOTE — PROGRESS NOTE ADULT - PROBLEM SELECTOR PLAN 2
- as above  - Chest X-Ray no acute pathology  - Continue Dilaudid 2mg q3 hours for severe pain  - Continue  Dilaudid 1mg q3 hours for moderate pain  - Bowel regimen   - Pain management following
Mild pain (score 1-3)  - Non-pharmacological pain treatment recommendations  - Warm/ Cool packs PRN   - Repositioning extremity, elevation, imagery, relaxation, distraction.  - Physical therapy OOB if no contraindications   Recommendations discussed with primary team and RN.
Mild pain (score 1-3)  - Non-pharmacological pain treatment recommendations  - Warm/ Cool packs PRN   - Repositioning extremity, elevation, imagery, relaxation, distraction.  - Physical therapy OOB if no contraindications   Recommendations discussed with primary team and RN.
- as above  - F/U Chest X-Ray to asses for acute chest, less likely given no chest pain or difficulty breathing  - Continue Dilaudid 2mg q3 hours for severe pain  - Continue  Dilaudid 1mg q3 hours for moderate pain  - Bowel regimen   - Pain management following

## 2024-03-20 NOTE — PROGRESS NOTE ADULT - PROBLEM SELECTOR PLAN 3
Urinary bladder and kidney ultrasound showed Fullness upper pole calyx of right and a lower pole calyx of the left kidney with bilateral duplex collecting systems.   - SCr of 1.75 on admission  - Baseline appears to be <1  - MRCP Iron deposition in the liver and kidneys compatible with sickle cell disease
- + blood culture form 3/18- growing gram + cocci in clusters ( staph epi)  - no WBCs, afebrile  - likely contaminated  - f/u repeat bcx from 3/20/24

## 2024-03-20 NOTE — DISCHARGE NOTE PROVIDER - NSDCMRMEDTOKEN_GEN_ALL_CORE_FT
acetaminophen 325 mg oral tablet: 2 tab(s) orally every 6 hours As needed Temp greater or equal to 38C (100.4F), Mild Pain (1 - 3)  allopurinol 200 mg oral tablet: 1 tab(s) orally once a day  folic acid 1 mg oral tablet: 1 tab(s) orally every 24 hours  omeprazole 20 mg oral delayed release capsule: 1 cap(s) orally once a day  oxyCODONE 30 mg oral tablet: 1 tab(s) orally every 4 hours as needed for  severe pain

## 2024-03-20 NOTE — DISCHARGE NOTE PROVIDER - NSDCCPCAREPLAN_GEN_ALL_CORE_FT
PRINCIPAL DISCHARGE DIAGNOSIS  Diagnosis: Anemia, sickle cell with crisis  Assessment and Plan of Treatment: You presented to the hospital with complaints of generalized body pain. Your hemoglobin was found to be low and you were transfused with 2 units of packed red blood cells.Yyour hemoglobin was monitored and remained stable. You were followed by hematologist who reccommends: Maintain above 5 or transfuse if symptomatic.   A sickle cell crisis is a painful episode that occurs in people who have sickle cell anemia. It happens when sickle-shaped red blood cells (RBCs) block blood vessels. Blood and oxygen cannot get to tissues, causing pain. A sickle cell crisis can also damage your tissues and cause organ failure, such liver or kidney failure. A sickle cell crisis can become life-threatening.  Call your local emergency number (911 in the [place]) if:  You have shortness of breath or chest pain.  Prevent a sickle cell crisis: Take vitamins and minerals as directed. Folic acid may help prevent blood vessel problems that can occur with sickle cell anemia. Zinc may decrease how often you have pain.  Drink liquids as directed. Dehydration can increase your risk for a sickle cell crisis. .Balance rest and exercise. Rest during a sickle cell crisis. Wash your hands frequently. Handwashing can help prevent illness. Avoid quick changes in temperature. Do not smoke cigarettes or drink alcohol. Ask about vaccines you may need. Vaccines can help prevent a viral infection that may lead to a sickle cell crisis  Please follow- up with Dr. Jordan.      SECONDARY DISCHARGE DIAGNOSES  Diagnosis: Positive blood culture  Assessment and Plan of Treatment: A blood culture is a test used to check for bacteria in the blood. You were found to have positive blood cultures, which is likely a contaminate. This means bacteria is found in the sample but it came from your skin or from the lab equipment, instead of your blood. This is called a false positive result. You did not have any fever or elevated white blood cell counts to suggest and acute infection.  You do not have an infection.    Diagnosis: MATA (acute kidney injury)  Assessment and Plan of Treatment: You presented with Elevated Creatinine likely multifactorial due to  dehydration and anemia. You had a renal ultrasound which showed Fullness upper pole calyx of right and a lower pole calyx of the left   kidney with bilateral duplex collecting systems. No convincing evidence   of hydronephrosis. Please continue oral hydration as much as possible within the daily drinking limit of 2 L per day. You are medically stable to be discharged from the hospital. You need to continue your medications regularly as prescribed.  Follow- up with your PCP routinely.    Diagnosis: Gout  Assessment and Plan of Treatment: You have a history of gout. Please continue with medication as prescribed. Follow-up with your primary care physician .  Call your physician if you develop pain not relieved with pain regimen, fever and or swelling/redness in your extremity (ies).      Diagnosis: Transaminitis  Assessment and Plan of Treatment: You were found to have transaminitis. Transaminitis means you have high levels of certain liver enzymes in your blood. These enzymes are released from liver cells when something injures those cells such as alcohol, medications, viruses and metabolic diseases are among the possible causes of liver damage and transaminitis. You had an MRCP which showed Iron deposition in the liver and kidneys compatible with sickle cell disease, intravascular hemolysis and numerous transfusions.  No biliary ductal dilatation or hepatobiliary mass.  Stable upper abdominal lymphadenopathy.  Please follow- up outpt for futher work- up     PRINCIPAL DISCHARGE DIAGNOSIS  Diagnosis: Anemia, sickle cell with crisis  Assessment and Plan of Treatment: You presented to the hospital with complaints of generalized body pain. Your hemoglobin was found to be low and you were transfused with 2 units of packed red blood cells.Yyour hemoglobin was monitored and remained stable. You were followed by hematologist who reccommends: Maintain above 5 or transfuse if symptomatic.   A sickle cell crisis is a painful episode that occurs in people who have sickle cell anemia. It happens when sickle-shaped red blood cells (RBCs) block blood vessels. Blood and oxygen cannot get to tissues, causing pain. A sickle cell crisis can also damage your tissues and cause organ failure, such liver or kidney failure. A sickle cell crisis can become life-threatening.  Call your local emergency number (911 in the [place]) if:  You have shortness of breath or chest pain.  Prevent a sickle cell crisis: Take vitamins and minerals as directed. Folic acid may help prevent blood vessel problems that can occur with sickle cell anemia. Zinc may decrease how often you have pain.  Drink liquids as directed. Dehydration can increase your risk for a sickle cell crisis. .Balance rest and exercise. Rest during a sickle cell crisis. Wash your hands frequently. Handwashing can help prevent illness. Avoid quick changes in temperature. Do not smoke cigarettes or drink alcohol. Ask about vaccines you may need. Vaccines can help prevent a viral infection that may lead to a sickle cell crisis  Please follow- up with Dr. Jordan.      SECONDARY DISCHARGE DIAGNOSES  Diagnosis: MATA (acute kidney injury)  Assessment and Plan of Treatment: You presented with Elevated Creatinine likely multifactorial due to  dehydration and anemia. You had a renal ultrasound which showed Fullness upper pole calyx of right and a lower pole calyx of the left   kidney with bilateral duplex collecting systems. No convincing evidence   of hydronephrosis. Please continue oral hydration as much as possible within the daily drinking limit of 2 L per day. You are medically stable to be discharged from the hospital. You need to continue your medications regularly as prescribed.  Follow- up with your PCP routinely.    Diagnosis: Positive blood culture  Assessment and Plan of Treatment: A blood culture is a test used to check for bacteria in the blood. You were found to have positive blood cultures, which is likely a contaminate. This means bacteria is found in the sample but it came from your skin or from the lab equipment, instead of your blood. This is called a false positive result. You did not have any fever or elevated white blood cell counts to suggest and acute infection.  You do not have an infection.    Diagnosis: Transaminitis  Assessment and Plan of Treatment: You were found to have transaminitis. Transaminitis means you have high levels of certain liver enzymes in your blood. These enzymes are released from liver cells when something injures those cells such as alcohol, medications, viruses and metabolic diseases are among the possible causes of liver damage and transaminitis. You also had biliary dilation. You had an MRCP which showed Iron deposition in the liver and kidneys compatible with sickle cell disease, intravascular hemolysis and numerous transfusions.  No biliary ductal dilatation or hepatobiliary mass.  Stable upper abdominal lymphadenopathy.  Please follow- up outpt for futher work- up    Diagnosis: Gout  Assessment and Plan of Treatment: You have a history of gout. Please continue with medication as prescribed. Follow-up with your primary care physician .  Call your physician if you develop pain not relieved with pain regimen, fever and or swelling/redness in your extremity (ies).  Allopurinol was increased to 200mg due to elevated uric acid numbers

## 2024-03-20 NOTE — PROGRESS NOTE ADULT - PROBLEM SELECTOR PLAN 4
renal US- Urinary bladder and kidney ultrasound showed Fullness upper pole calyx of right and a lower pole calyx of the left kidney with bilateral duplex collecting systems.   - SCr of 1.75 on admission-->1.22 downtrending  - Baseline appears to be <1
- Elevated Bilirubin to 10.8  - Elevated Alk phos to 133  - Elevated AST to 194  - Elevated ALT to 113  - Patient with history of elevated LFts  - RUQ US from December 2023 showing diffuse liver diease   - Considered to be 2/2 multiple transfusions causing iron deposition   - appears stable since prior admission

## 2024-03-20 NOTE — DISCHARGE NOTE NURSING/CASE MANAGEMENT/SOCIAL WORK - PATIENT PORTAL LINK FT
You can access the FollowMyHealth Patient Portal offered by [facility] by registering at the following website: [email or web address]. By joining [employer]’s FollowMyHealth portal, you will also be able to view your health information using other applications (apps) compatible with our system.

## 2024-03-20 NOTE — PROGRESS NOTE ADULT - PROBLEM SELECTOR PLAN 6
Pt requesting apixaban 5 mg tab. Pending script sent to [facility]. OV 10/28/2019 NPLR  Plan:   1. Continue propafenone and diltiazem  2. Annual EKG and labs due 2020  3. Stay active along with a healthy diet  4.  Follow up in 1 year    Next OV 10/5/2020 JOBY
- History of Gout  - Patient on allopurinol and colchicine at home  - c/w home gout meds  -  uric acid level  9.0
- SCD given anemia

## 2024-03-20 NOTE — PROGRESS NOTE ADULT - SUBJECTIVE AND OBJECTIVE BOX
CHIEF COMPLAINT  Anemia    HISTORY OF PRESENT ILLNESS  TOLU VARGAS is a 43y Male who presents with a chief complaint of anemia    No acute events. No complaints.    REVIEW OF SYSTEMS  A complete review of systems was performed; negative except per HPI    PHYSICAL EXAM  T(C): 36.5 (03-19-24 @ 05:29), Max: 36.7 (03-18-24 @ 12:19)  HR: 70 (03-19-24 @ 05:29) (70 - 78)  BP: 149/84 (03-19-24 @ 05:29) (132/80 - 149/84)  RR: 17 (03-19-24 @ 05:29) (17 - 18)  SpO2: 94% (03-19-24 @ 05:29) (93% - 96%)  Constitutional: alert, awake, in no acute distress  Eyes: PERRL, EOMI  HEENT: normocephalic, atraumatic  Neck: supple, non-tender  Cardiovascular: normal perfusion, no peripheral edema  Respiratory: normal respiratory efforts; no increased use of accessory muscles  Gastrointestinal: soft, non-tender  Musculoskeletal: normal range of motion, no deformities noted  Neurological: alert, CN II to XI grossly intact  Skin: warm, dry    LABORATORY DATA                        8.1    8.96  )-----------( 269      ( 19 Mar 2024 05:39 )             22.6     03-19    137  |  111<H>  |  25<H>  ----------------------------<  115<H>  4.7   |  24  |  1.45<H>    Ca    8.8      19 Mar 2024 05:39  Phos  3.8     03-18  Mg     1.9     03-18    TPro  7.1  /  Alb  3.0<L>  /  TBili  9.6<H>  /  DBili  x   /  AST  158<H>  /  ALT  89<H>  /  AlkPhos  130<H>  03-19    RADIOLOGY REVIEW  IMPRESSION:  Iron deposition in the liver and kidneys compatible with sickle cell   disease, intravascular hemolysis and numerous transfusions.    No biliary ductal dilatation or hepatobiliary mass.    Stable upper abdominal lymphadenopathy.  
CHIEF COMPLAINT  Anemia    HISTORY OF PRESENT ILLNESS  TOLU VARGAS is a 43y Male who presents with a chief complaint of anemia    Subjective:  Patient seen at bedside  No acute events. No complaints.    REVIEW OF SYSTEMS  14 point ROS negative except for above    Allergies    penicillin (Pruritus)  hydroxyurea (Other)  piperacillin-tazobactam (Urticaria)  ceftriaxone (Anaphylaxis)    Intolerances    MEDICATIONS  (STANDING):  allopurinol 200 milliGRAM(s) Oral daily  folic acid 1 milliGRAM(s) Oral every 24 hours  heparin   Injectable 5000 Unit(s) SubCutaneous every 8 hours  lidocaine   4% Patch 2 Patch Transdermal daily  pantoprazole    Tablet 40 milliGRAM(s) Oral before breakfast  polyethylene glycol 3350 17 Gram(s) Oral daily  senna 2 Tablet(s) Oral at bedtime    MEDICATIONS  (PRN):  acetaminophen     Tablet .. 650 milliGRAM(s) Oral every 6 hours PRN Temp greater or equal to 38C (100.4F), Mild Pain (1 - 3)  HYDROmorphone  Injectable 2 milliGRAM(s) IV Push every 3 hours PRN Severe Pain (7 - 10)      PHYSICAL EXAM  Vital Signs Last 24 Hrs  T(C): 36.7 (20 Mar 2024 12:20), Max: 36.8 (19 Mar 2024 20:45)  T(F): 98 (20 Mar 2024 12:20), Max: 98.2 (19 Mar 2024 20:45)  HR: 73 (20 Mar 2024 12:20) (68 - 73)  BP: 149/81 (20 Mar 2024 12:20) (146/76 - 155/83)  BP(mean): 98 (20 Mar 2024 12:20) (98 - 107)  RR: 16 (20 Mar 2024 12:20) (16 - 17)  SpO2: 97% (20 Mar 2024 12:20) (95% - 97%)    Parameters below as of 20 Mar 2024 12:20  Patient On (Oxygen Delivery Method): room air      Constitutional: alert, awake, in no acute distress  Eyes: PERRL, EOMI  HEENT: normocephalic, atraumatic  Neck: supple, non-tender  Cardiovascular: normal perfusion, no peripheral edema  Respiratory: normal respiratory efforts; no increased use of accessory muscles  Gastrointestinal: soft, non-tender  Musculoskeletal: normal range of motion, no deformities noted  Neurological: alert, CN II to XI grossly intact  Skin: warm, dry    LABORATORY DATA                                       8.0    8.48  )-----------( 257      ( 20 Mar 2024 11:42 )             21.9   03-20    135  |  111<H>  |  27<H>  ----------------------------<  90  5.0   |  19<L>  |  1.22    Ca    9.4      20 Mar 2024 07:15    TPro  7.9  /  Alb  3.3<L>  /  TBili  9.8<H>  /  DBili  x   /  AST  165<H>  /  ALT  87<H>  /  AlkPhos  133<H>  03-20  
  INTERVAL HPI/OVERNIGHT EVENTS:   no new complaints, still endorsing severe pain. No complaints of bleeding.    REVIEW OF SYSTEMS:  negative except per hpi    Vital Signs Last 24 Hrs  T(C): 36.5 (19 Mar 2024 05:29), Max: 36.6 (18 Mar 2024 20:39)  T(F): 97.7 (19 Mar 2024 05:29), Max: 97.9 (18 Mar 2024 20:39)  HR: 70 (19 Mar 2024 05:29) (70 - 73)  BP: 149/84 (19 Mar 2024 05:29) (132/80 - 149/84)  BP(mean): 99 (18 Mar 2024 20:39) (99 - 99)  RR: 17 (19 Mar 2024 05:29) (17 - 18)  SpO2: 94% (19 Mar 2024 05:29) (93% - 95%)    Parameters below as of 19 Mar 2024 05:29  Patient On (Oxygen Delivery Method): room air        PHYSICAL EXAMINATION:  NAD despite endorsing severe pain. No jaundiced. Ambulating independently, abd soft nontender, no lower ext edema, heart regular rate and rhythm                        8.1    8.96  )-----------( 269      ( 19 Mar 2024 05:39 )             22.6     03-19    137  |  111<H>  |  25<H>  ----------------------------<  115<H>  4.7   |  24  |  1.45<H>    Ca    8.8      19 Mar 2024 05:39  Phos  3.8     03-18  Mg     1.9     03-18    TPro  7.1  /  Alb  3.0<L>  /  TBili  9.6<H>  /  DBili  x   /  AST  158<H>  /  ALT  89<H>  /  AlkPhos  130<H>  03-19    LIVER FUNCTIONS - ( 19 Mar 2024 05:39 )  Alb: 3.0 g/dL / Pro: 7.1 g/dL / ALK PHOS: 130 U/L / ALT: 89 U/L DA / AST: 158 U/L / GGT: x               PT/INR - ( 18 Mar 2024 03:00 )   PT: 12.4 sec;   INR: 1.09 ratio         PTT - ( 18 Mar 2024 03:00 )  PTT:>200.0 sec    CAPILLARY BLOOD GLUCOSE      RADIOLOGY & ADDITIONAL TESTS:                  
NP Note discussed with  primary attending    Patient is a 43y old  Male who presents with a chief complaint of Symptomatic anemia 2/2 sickle cell diease and sickle cell crisis (19 Mar 2024 12:57)      INTERVAL HPI/OVERNIGHT EVENTS: no new complaints, generalized pain due to sickle cell. Kidney function is worsening. Pain management is following   Urinary bladder and kidney showed Fullness upper pole calyx of right and a lower pole calyx of the left kidney with bilateral duplex collecting systems. No convincing evidence of hydronephrosis. Plan for dc in am     MEDICATIONS  (STANDING):  folic acid 1 milliGRAM(s) Oral every 24 hours  heparin   Injectable 5000 Unit(s) SubCutaneous every 8 hours  lidocaine   4% Patch 2 Patch Transdermal daily  pantoprazole    Tablet 40 milliGRAM(s) Oral before breakfast  polyethylene glycol 3350 17 Gram(s) Oral daily  senna 2 Tablet(s) Oral at bedtime    MEDICATIONS  (PRN):  acetaminophen     Tablet .. 650 milliGRAM(s) Oral every 6 hours PRN Temp greater or equal to 38C (100.4F), Mild Pain (1 - 3)  HYDROmorphone  Injectable 2 milliGRAM(s) IV Push every 3 hours PRN Severe Pain (7 - 10)      __________________________________________________  REVIEW OF SYSTEMS:    CONSTITUTIONAL: No fever,   EYES: no acute visual disturbances  NECK: No pain or stiffness  RESPIRATORY: No cough; No shortness of breath  CARDIOVASCULAR: No chest pain, no palpitations  GASTROINTESTINAL: No pain. No nausea or vomiting; No diarrhea   NEUROLOGICAL: No headache or numbness, no tremors  MUSCULOSKELETAL: Generalized pain   GENITOURINARY: no dysuria, no frequency, no hesitancy  PSYCHIATRY: no depression , no anxiety  ALL OTHER  ROS negative        Vital Signs Last 24 Hrs  T(C): 36.5 (19 Mar 2024 13:35), Max: 36.6 (18 Mar 2024 20:39)  T(F): 97.7 (19 Mar 2024 13:35), Max: 97.9 (18 Mar 2024 20:39)  HR: 63 (19 Mar 2024 13:35) (63 - 73)  BP: 145/82 (19 Mar 2024 13:35) (136/80 - 149/84)  BP(mean): 98 (19 Mar 2024 13:35) (98 - 99)  RR: 16 (19 Mar 2024 13:35) (16 - 18)  SpO2: 99% (19 Mar 2024 13:35) (93% - 99%)    Parameters below as of 19 Mar 2024 13:35  Patient On (Oxygen Delivery Method): room air        ________________________________________________  PHYSICAL EXAM:  GENERAL: NAD  HEENT: Normocephalic;  conjunctivae and sclerae clear; moist mucous membranes;   NECK : supple  CHEST/LUNG: Clear to ausculitation bilaterally with good air entry   HEART: S1 S2  regular; no murmurs, gallops or rubs  ABDOMEN: Soft, Nontender, Nondistended; Bowel sounds present  EXTREMITIES:  Generalized pain   SKIN: warm and dry; no rash  NERVOUS SYSTEM:  Awake and alert; Oriented  to place, person and time ; no new deficits    _________________________________________________  LABS:                        8.1    8.96  )-----------( 269      ( 19 Mar 2024 05:39 )             22.6     03-19    137  |  111<H>  |  25<H>  ----------------------------<  115<H>  4.7   |  24  |  1.45<H>    Ca    8.8      19 Mar 2024 05:39  Phos  3.8     03-18  Mg     1.9     03-18    TPro  7.1  /  Alb  3.0<L>  /  TBili  9.6<H>  /  DBili  x   /  AST  158<H>  /  ALT  89<H>  /  AlkPhos  130<H>  03-19    PT/INR - ( 18 Mar 2024 03:00 )   PT: 12.4 sec;   INR: 1.09 ratio         PTT - ( 18 Mar 2024 03:00 )  PTT:>200.0 sec  Urinalysis Basic - ( 19 Mar 2024 05:39 )    Color: x / Appearance: x / SG: x / pH: x  Gluc: 115 mg/dL / Ketone: x  / Bili: x / Urobili: x   Blood: x / Protein: x / Nitrite: x   Leuk Esterase: x / RBC: x / WBC x   Sq Epi: x / Non Sq Epi: x / Bacteria: x      CAPILLARY BLOOD GLUCOSE            RADIOLOGY & ADDITIONAL TESTS:    < from: US Urinary Bladder (03.19.24 @ 11:17) >  IMPRESSION:  Fullness upper pole calyx of right and a lower pole calyx of the left   kidney with bilateral duplex collecting systems. No convincing evidence   of hydronephrosis.    < end of copied text >  < from: US Renal (03.19.24 @ 11:17) >  IMPRESSION:  Fullness upper pole calyx of right and a lower pole calyx of the left   kidney with bilateral duplex collecting systems. No convincing evidence   of hydronephrosis.      < end of copied text >      Imaging Personally Reviewed:  YES/NO    Consultant(s) Notes Reviewed:   YES/ No    Care Discussed with Consultants :     Plan of care was discussed with patient and /or primary care giver; all questions and concerns were addressed and care was aligned with patient's wishes.    
Source of information: TOLU VARGAS, Chart review  Patient language: English  : n/a    HPI:  Patient is a 43 year old male from home, ambulates independently, with PMH of gout and sickle cell diease with chronic anemia and baseline Hb of 6 who presented to the ED generalized pain.  Patient states he has been having increased weakness and pain for the past 5 days.  Patient states this feels like how he normally feels when he has his sickle cell anemia pain.  Patient states his hematologist Dr. Jordan told him his blood levels were low and he should come to the hospital for a blood transfusion.   Patient states he has diffuse body pain, worse in shoulders hips, back and legs.  Patient denies chest pain or difficulty breathing.  Patient states he has been taking his pain medication as prescribed and until today his pain has been tolerable.  Patient denies nausea, vomiting, headache, blurry vision, dizziness, chest pain, abdominal pain, constipation, diarrhea, leg swelling.  (18 Mar 2024 04:04)    Pt is admitted for symptomatic anemia secondary to sickle crisis. On admission H/H (5.8/15.9), retic count 22.4 %, status post 2 units PRBCs. Pain service consulted for management of generalized pain secondary to sickle cell crisis. Patient is well known to the pain service last followed during admission in 10/2023 for similar presentation. Pt seen and examined at bedside this morning. At time of assessment, patient reports pain score 8/10 despite receiving pain medication an hour prior SCALE USED: (1-10 VNRS). Pt reports generalized body pain with exacerbation of symptoms to the low back, bilateral hips and rib cage with movement. The pain is described as constant, sharp, and throbbing in quality and is relieved with administration of Dilaudid IVP. Pt tolerating PO diet and continues to report lethargy. Denies chest pain, SOB, nausea, vomiting, constipation. Reports last BM 3/18. Patient stated goal for pain control: to be able to take deep breaths, get out of bed to chair and ambulate with tolerable pain control. Pt is opioid dependent, reports taking oxycodone 30mg up to four times daily, dose verified on iSTOP. Patient follows Dr. Jordan for outpatient hematology.     PAST MEDICAL & SURGICAL HISTORY:  Sickle cell anemia    Gout    History of cholecystectomy    FAMILY HISTORY:  Family history of sickle cell trait (Father, Mother)    Patient's father is  (Father)    Patient's mother is  (Mother)    Social History:  43 year old male from home. (18 Mar 2024 04:04)    [x] Denies ETOH use, illicit drug use and smoking. Reports occasional marijuana use.     Allergies    penicillin (Pruritus)  hydroxyurea (Other)  piperacillin-tazobactam (Urticaria)  ceftriaxone (Anaphylaxis)    Intolerances    MEDICATIONS  (STANDING):  folic acid 1 milliGRAM(s) Oral every 24 hours  heparin   Injectable 5000 Unit(s) SubCutaneous every 8 hours  lidocaine   4% Patch 2 Patch Transdermal daily  pantoprazole    Tablet 40 milliGRAM(s) Oral before breakfast  polyethylene glycol 3350 17 Gram(s) Oral daily  senna 2 Tablet(s) Oral at bedtime    MEDICATIONS  (PRN):  acetaminophen     Tablet .. 650 milliGRAM(s) Oral every 6 hours PRN Temp greater or equal to 38C (100.4F), Mild Pain (1 - 3)  HYDROmorphone  Injectable 2 milliGRAM(s) IV Push every 3 hours PRN Severe Pain (7 - 10)    Vital Signs Last 24 Hrs  T(C): 36.5 (19 Mar 2024 05:29), Max: 36.7 (18 Mar 2024 12:19)  T(F): 97.7 (19 Mar 2024 05:29), Max: 98 (18 Mar 2024 12:19)  HR: 70 (19 Mar 2024 05:29) (70 - 78)  BP: 149/84 (19 Mar 2024 05:29) (132/80 - 149/84)  BP(mean): 99 (18 Mar 2024 20:39) (99 - 99)  RR: 17 (19 Mar 2024 05:29) (17 - 18)  SpO2: 94% (19 Mar 2024 05:29) (93% - 96%)    Parameters below as of 19 Mar 2024 05:29  Patient On (Oxygen Delivery Method): room air    SARS-CoV-2: NotDetec (15 Dec 2023 09:57)  SARS-CoV-2: NotDetec (13 Oct 2023 00:57)    LABS: Reviewed                          8.1    8.96  )-----------( 269      ( 19 Mar 2024 05:39 )             22.6     03-19    137  |  111<H>  |  25<H>  ----------------------------<  115<H>  4.7   |  24  |  1.45<H>    Ca    8.8      19 Mar 2024 05:39  Phos  3.8     -  Mg     1.9         TPro  7.1  /  Alb  3.0<L>  /  TBili  9.6<H>  /  DBili  x   /  AST  158<H>  /  ALT  89<H>  /  AlkPhos  130<H>      PT/INR - ( 18 Mar 2024 03:00 )   PT: 12.4 sec;   INR: 1.09 ratio      PTT - ( 18 Mar 2024 03:00 )  PTT:>200.0 sec  LIVER FUNCTIONS - ( 19 Mar 2024 05:39 )  Alb: 3.0 g/dL / Pro: 7.1 g/dL / ALK PHOS: 130 U/L / ALT: 89 U/L DA / AST: 158 U/L / GGT: x           Urinalysis Basic - ( 19 Mar 2024 05:39 )    Color: x / Appearance: x / SG: x / pH: x  Gluc: 115 mg/dL / Ketone: x  / Bili: x / Urobili: x   Blood: x / Protein: x / Nitrite: x   Leuk Esterase: x / RBC: x / WBC x   Sq Epi: x / Non Sq Epi: x / Bacteria: x    CAPILLARY BLOOD GLUCOSE    SARS-CoV-2: NotDetec (15 Dec 2023 09:57)  SARS-CoV-2: NotDetec (13 Oct 2023 00:57)    Radiology: None to Review.   < from: MR MRCP w/wo IV Cont (24 @ 17:24) >    ACC: [id number] EXAM:  MR MRCP WAW IC   ORDERED BY: BRIELLE JORDAN     PROCEDURE DATE:  2024      INTERPRETATION:  CLINICAL INFORMATION: Jaundice, known retroperitoneal   lymph nodes    COMPARISON: Ultrasound 2023, CT 2023    CONTRAST/COMPLICATIONS:  IV Contrast: Gadavist  9 cc administered   1 cc discarded  Oral Contrast: NONE  Complications: None reported at time of study completion    PROCEDURE:  MRI of the abdomen was performed.  MRCP was performed.    FINDINGS:  LOWER CHEST: Cardiomegaly.    LIVER: Cirrhotic morphology. Diffuse iron deposition.  BILE DUCTS: Normal caliber.  GALLBLADDER: Cholecystectomy.  SPLEEN: Markedly shrunken spleen, likely autoinfarcted.  PANCREAS: Within normal limits.  ADRENALS: Within normal limits.  KIDNEYS/URETERS: Low T2 signal in the renal cortices, likely due to iron   deposition. Small bilateral cysts.    VISUALIZED PORTIONS:  BOWEL: Within normal limits.  PERITONEUM: No ascites.  VESSELS: Within normal limits.  RETROPERITONEUM/LYMPH NODES: Stable periportal and periceliac   lymphadenopathy, for example a 2.8 x 1.6 cm periceliac node (4; 15).  ABDOMINAL WALL: Within normal limits.  BONES: No suspicious lesion. Stable T12 compression deformity.    IMPRESSION:  Iron deposition in the liver and kidneys compatible with sickle cell   disease, intravascular hemolysis and numerous transfusions.    No biliary ductal dilatation or hepatobiliary mass.    Stable upper abdominal lymphadenopathy.    --- End of Report ---    ROBERT SHEN MD; Attending Radiologist  This document has been electronically signed. Mar 18 2024  8:12PM    < end of copied text >    ORT Score -   Family Hx of substance abuse	Female	      Male  Alcohol 	                                           1                     3  Illegal drugs	                                   2                     3  Rx drugs                                           4 	                  4  Personal Hx of substance abuse		  Alcohol 	                                          3	                  3  Illegal drugs                                     4	                  4  Rx drugs                                            5 	                  5  Age between 16- 45 years	           1                     1  hx preadolescent sexual abuse	   3 	                  0  Psychological disease		  ADD, OCD, bipolar, schizophrenia   2	          2  Depression                                           1 	          1  Total: 0    a score of 3 or lower indicates low risk for opioid abuse		  a score of 4-7 indicates moderate risk for opioid abuse		  a score of 8 or higher indicates high risk for opioid abuse  	  REVIEW OF SYSTEMS:  CONSTITUTIONAL: + fatigue  HEENT:  No difficulty hearing, no change in vision  NECK: No pain or stiffness  RESPIRATORY: No cough, wheezing, chills or hemoptysis; No shortness of breath  CARDIOVASCULAR: No chest pain, palpitations, dizziness, or leg swelling  GASTROINTESTINAL: No loss of appetite, decreased PO intake. No abdominal or epigastric pain. No nausea, vomiting; No diarrhea or constipation.   GENITOURINARY: No dysuria, frequency, hematuria, retention or incontinence  MUSCULOSKELETAL: + generalized joint pain; + low back pain, no upper or lower motor strength weakness, no saddle anesthesia, bowel/bladder incontinence, no falls   NEURO: No headaches, No numbness/tingling b/l LE  ENDOCRINE: No polyuria, polydipsia, heat or cold intolerance; No hair loss  PSYCHIATRIC: No depression, anxiety or difficulty sleeping    PHYSICAL EXAM:  GENERAL:  Alert & Oriented X4, cooperative, NAD, Speech is clear.   HEENT:  + scleral icterus   RESPIRATORY: Respirations even and unlabored. Clear to auscultation bilaterally  CARDIOVASCULAR: Normal S1/S2, regular rate and rhythm  GASTROINTESTINAL:  Soft, Nontender, Nondistended; Bowel sounds present  PERIPHERAL VASCULAR:  Extremities warm without edema. 2+ Peripheral Pulses, No cyanosis, No calf tenderness  MUSCULOSKELETAL: Motor Strength 5/5 B/L upper and lower extremities; moves all extremities equally against gravity; ROM intact; negative SLR; + generalized joint tenderness  SKIN: Warm, dry, intact    Risk factors associated with adverse outcomes related to opioid treatment  [ ] Concurrent benzodiazepine use  [ ] History/ Active substance use or alcohol use disorder  [ ] Psychiatric co-morbidity  [ ] Sleep apnea  [ ] COPD  [ ] BMI> 35  [x] Liver dysfunction  [x] Renal dysfunction  [ ] CHF  [ ] Smoker  [ ] Age > 60 years    [x]  [place]  Reviewed and Copied to Chart. See below.    Plan of care and goal oriented pain management treatment options were discussed with patient and /or primary care giver; all questions and concerns were addressed and care was aligned with patient's wishes.    Educated patient on goal oriented pain management treatment options     
Source of information: TOLU VARGAS, Chart review  Patient language: English  : n/a    HPI:  Patient is a 43 year old male from home, ambulates independently, with PMH of gout and sickle cell diease with chronic anemia and baseline Hb of 6 who presented to the ED generalized pain.  Patient states he has been having increased weakness and pain for the past 5 days.  Patient states this feels like how he normally feels when he has his sickle cell anemia pain.  Patient states his hematologist Dr. Jordan told him his blood levels were low and he should come to the hospital for a blood transfusion.   Patient states he has diffuse body pain, worse in shoulders hips, back and legs.  Patient denies chest pain or difficulty breathing.  Patient states he has been taking his pain medication as prescribed and until today his pain has been tolerable.  Patient denies nausea, vomiting, headache, blurry vision, dizziness, chest pain, abdominal pain, constipation, diarrhea, leg swelling.  (18 Mar 2024 04:04)    Pt is admitted for symptomatic anemia secondary to sickle crisis. On admission H/H (5.8/15.9), retic count 22.4 %, status post 2 units PRBCs. Pain service consulted for management of generalized pain secondary to sickle cell crisis. Patient is well known to the pain service last followed during admission in 10/2023 for similar presentation. Pt seen and examined at bedside this morning. At time of assessment, patient reports pain score 7/10 SCALE USED: (1-10 VNRS). Pt reports generalized body pain with exacerbation of symptoms to the low back with movement. The pain is described as constant, sharp, and throbbing in quality and is relieved with administration of Dilaudid IVP. Pt tolerating PO diet and continues to report lethargy. Denies chest pain, SOB, nausea, vomiting, constipation. Reports last BM 3/20. Patient stated goal for pain control: to be able to take deep breaths, get out of bed to chair and ambulate with tolerable pain control. Pt is opioid dependent, reports taking oxycodone 30mg up to four times daily, dose verified on iSTOP. Patient follows Dr. Jordan for outpatient hematology.     PAST MEDICAL & SURGICAL HISTORY:  Sickle cell anemia    Gout    History of cholecystectomy    FAMILY HISTORY:  Family history of sickle cell trait (Father, Mother)    Patient's father is  (Father)    Patient's mother is  (Mother)    Social History:  43 year old male from home. (18 Mar 2024 04:04)    [x] Denies ETOH use, illicit drug use and smoking. Reports occasional marijuana use.     Allergies    penicillin (Pruritus)  hydroxyurea (Other)  piperacillin-tazobactam (Urticaria)  ceftriaxone (Anaphylaxis)    Intolerances    MEDICATIONS  (STANDING):  allopurinol 200 milliGRAM(s) Oral daily  folic acid 1 milliGRAM(s) Oral every 24 hours  heparin   Injectable 5000 Unit(s) SubCutaneous every 8 hours  lidocaine   4% Patch 2 Patch Transdermal daily  pantoprazole    Tablet 40 milliGRAM(s) Oral before breakfast  polyethylene glycol 3350 17 Gram(s) Oral daily  senna 2 Tablet(s) Oral at bedtime    MEDICATIONS  (PRN):  acetaminophen     Tablet .. 650 milliGRAM(s) Oral every 6 hours PRN Temp greater or equal to 38C (100.4F), Mild Pain (1 - 3)  HYDROmorphone  Injectable 2 milliGRAM(s) IV Push every 3 hours PRN Severe Pain (7 - 10)    Vital Signs Last 24 Hrs  T(C): 36.5 (20 Mar 2024 05:01), Max: 36.8 (19 Mar 2024 20:45)  T(F): 97.7 (20 Mar 2024 05:01), Max: 98.2 (19 Mar 2024 20:45)  HR: 68 (20 Mar 2024 05:01) (63 - 69)  BP: 155/83 (20 Mar 2024 05:01) (145/82 - 155/83)  BP(mean): 107 (20 Mar 2024 05:01) (98 - 107)  RR: 17 (20 Mar 2024 05:01) (16 - 17)  SpO2: 95% (20 Mar 2024 05:01) (95% - 99%)    Parameters below as of 20 Mar 2024 05:01  Patient On (Oxygen Delivery Method): room air    SARS-CoV-2: NotDetec (15 Dec 2023 09:57)  SARS-CoV-2: NotDetec (13 Oct 2023 00:57)    LABS: Reviewed                          8.0    8.48  )-----------( 257      ( 20 Mar 2024 11:42 )             21.9     03-20    135  |  111<H>  |  27<H>  ----------------------------<  90  5.0   |  19<L>  |  1.22    Ca    9.4      20 Mar 2024 07:15  Phos  3.8     03-18  Mg     1.9     -18    TPro  7.9  /  Alb  3.3<L>  /  TBili  9.8<H>  /  DBili  x   /  AST  165<H>  /  ALT  87<H>  /  AlkPhos  133<H>  -20    LIVER FUNCTIONS - ( 20 Mar 2024 07:15 )  Alb: 3.3 g/dL / Pro: 7.9 g/dL / ALK PHOS: 133 U/L / ALT: 87 U/L DA / AST: 165 U/L / GGT: x           Urinalysis Basic - ( 20 Mar 2024 07:15 )    Color: x / Appearance: x / SG: x / pH: x  Gluc: 90 mg/dL / Ketone: x  / Bili: x / Urobili: x   Blood: x / Protein: x / Nitrite: x   Leuk Esterase: x / RBC: x / WBC x   Sq Epi: x / Non Sq Epi: x / Bacteria: x    CAPILLARY BLOOD GLUCOSE    SARS-CoV-2: NotDetec (15 Dec 2023 09:57)  SARS-CoV-2: NotDetec (13 Oct 2023 00:57)    Radiology: None to Review.   < from: US Urinary Bladder (24 @ 11:17) >    ACC: [id number] EXAM:  US URINARY BLADDER   ORDERED BY: CLAY VASQUEZ     ACC: [id number] EXAM:  US KIDNEY(S)   ORDERED BY: CLAY VASQUEZ     PROCEDURE DATE:  2024      INTERPRETATION:  CLINICAL INFORMATION: Hydronephrosis.    COMPARISON:None available.    TECHNIQUE: Sonography of the kidneys and bladder.    FINDINGS:  Right kidney: 12.9 cm. No renal mass, hydronephrosis or calculi. Fullness   of the upper pole calyx with a duplicated collecting system.    Left kidney: 10.9 cm. No renal mass, hydronephrosis or calculi. Fullness   of the lower pole calyx with a duplicated collecting system. 1.9 cm   midpole cyst.    Urinary bladder: Within normal limits. Prevoid volume was 234 cc and post   void volume was 13 cc.    IMPRESSION:  Fullness upper pole calyx of right and a lower pole calyx of the left   kidney with bilateral duplex collecting systems. No convincing evidence   of hydronephrosis.    --- End of Report ---    PHOENIX DYKES MD; Attending Radiologist  This document has been electronically signed. Mar 19 2024  3:50PM    < end of copied text >    < from: MR MRCP w/wo IV Cont (24 @ 17:24) >    ACC: [id number] EXAM:  MR MRCP WAW IC   ORDERED BY: BRIELLE JORDAN     PROCEDURE DATE:  2024      INTERPRETATION:  CLINICAL INFORMATION: Jaundice, known retroperitoneal   lymph nodes    COMPARISON: Ultrasound 2023, CT 2023    CONTRAST/COMPLICATIONS:  IV Contrast: Gadavist  9 cc administered   1 cc discarded  Oral Contrast: NONE  Complications: None reported at time of study completion    PROCEDURE:  MRI of the abdomen was performed.  MRCP was performed.    FINDINGS:  LOWER CHEST: Cardiomegaly.    LIVER: Cirrhotic morphology. Diffuse iron deposition.  BILE DUCTS: Normal caliber.  GALLBLADDER: Cholecystectomy.  SPLEEN: Markedly shrunken spleen, likely autoinfarcted.  PANCREAS: Within normal limits.  ADRENALS: Within normal limits.  KIDNEYS/URETERS: Low T2 signal in the renal cortices, likely due to iron   deposition. Small bilateral cysts.    VISUALIZED PORTIONS:  BOWEL: Within normal limits.  PERITONEUM: No ascites.  VESSELS: Within normal limits.  RETROPERITONEUM/LYMPH NODES: Stable periportal and periceliac   lymphadenopathy, for example a 2.8 x 1.6 cm periceliac node (4; 15).  ABDOMINAL WALL: Within normal limits.  BONES: No suspicious lesion. Stable T12 compression deformity.    IMPRESSION:  Iron deposition in the liver and kidneys compatible with sickle cell   disease, intravascular hemolysis and numerous transfusions.    No biliary ductal dilatation or hepatobiliary mass.    Stable upper abdominal lymphadenopathy.    --- End of Report ---    ROBERT SHEN MD; Attending Radiologist  This document has been electronically signed. Mar 18 2024  8:12PM    < end of copied text >    ORT Score -   Family Hx of substance abuse	Female	      Male  Alcohol 	                                           1                     3  Illegal drugs	                                   2                     3  Rx drugs                                           4 	                  4  Personal Hx of substance abuse		  Alcohol 	                                          3	                  3  Illegal drugs                                     4	                  4  Rx drugs                                            5 	                  5  Age between 16- 45 years	           1                     1  hx preadolescent sexual abuse	   3 	                  0  Psychological disease		  ADD, OCD, bipolar, schizophrenia   2	          2  Depression                                           1 	          1  Total: 0    a score of 3 or lower indicates low risk for opioid abuse		  a score of 4-7 indicates moderate risk for opioid abuse		  a score of 8 or higher indicates high risk for opioid abuse  	  REVIEW OF SYSTEMS:  CONSTITUTIONAL: + fatigue  HEENT:  No difficulty hearing, no change in vision  NECK: No pain or stiffness  RESPIRATORY: No cough, wheezing, chills or hemoptysis; No shortness of breath  CARDIOVASCULAR: No chest pain, palpitations, dizziness, or leg swelling  GASTROINTESTINAL: No loss of appetite, decreased PO intake. No abdominal or epigastric pain. No nausea, vomiting; No diarrhea or constipation.   GENITOURINARY: No dysuria, frequency, hematuria, retention or incontinence  MUSCULOSKELETAL: + generalized joint pain; + low back pain, no upper or lower motor strength weakness, no saddle anesthesia, bowel/bladder incontinence, no falls   NEURO: No headaches, No numbness/tingling b/l LE  ENDOCRINE: No polyuria, polydipsia, heat or cold intolerance; No hair loss  PSYCHIATRIC: No depression, anxiety or difficulty sleeping    PHYSICAL EXAM:  GENERAL:  Alert & Oriented X4, cooperative, NAD, Speech is clear.   HEENT:  + scleral icterus   RESPIRATORY: Respirations even and unlabored. Clear to auscultation bilaterally  CARDIOVASCULAR: Normal S1/S2, regular rate and rhythm  GASTROINTESTINAL:  Soft, Nontender, Nondistended; Bowel sounds present  PERIPHERAL VASCULAR:  Extremities warm without edema. 2+ Peripheral Pulses, No cyanosis, No calf tenderness  MUSCULOSKELETAL: Motor Strength 5/5 B/L upper and lower extremities; moves all extremities equally against gravity; ROM intact; negative SLR; + generalized joint tenderness  SKIN: Warm, dry, intact    Risk factors associated with adverse outcomes related to opioid treatment  [ ] Concurrent benzodiazepine use  [ ] History/ Active substance use or alcohol use disorder  [ ] Psychiatric co-morbidity  [ ] Sleep apnea  [ ] COPD  [ ] BMI> 35  [x] Liver dysfunction  [x] Renal dysfunction  [ ] CHF  [ ] Smoker  [ ] Age > 60 years    [x]  [place]  Reviewed and Copied to Chart. See below.    Plan of care and goal oriented pain management treatment options were discussed with patient and /or primary care giver; all questions and concerns were addressed and care was aligned with patient's wishes.    Educated patient on goal oriented pain management treatment options     
Patient is a 43y old  Male who presents with a chief complaint of Symptomatic anemia 2/2 sickle cell diease and sickle cell crisis (19 Mar 2024 16:33)      INTERVAL HPI/OVERNIGHT EVENTS: no overnight events    I&O's Summary    Vital Signs Last 24 Hrs  T(C): 36.5 (20 Mar 2024 05:01), Max: 36.8 (19 Mar 2024 20:45)  T(F): 97.7 (20 Mar 2024 05:01), Max: 98.2 (19 Mar 2024 20:45)  HR: 68 (20 Mar 2024 05:01) (63 - 69)  BP: 155/83 (20 Mar 2024 05:01) (145/82 - 155/83)  BP(mean): 107 (20 Mar 2024 05:01) (98 - 107)  RR: 17 (20 Mar 2024 05:01) (16 - 17)  SpO2: 95% (20 Mar 2024 05:01) (95% - 99%)    Parameters below as of 20 Mar 2024 05:01  Patient On (Oxygen Delivery Method): room air      PAST MEDICAL & SURGICAL HISTORY:  Sickle cell anemia      Gout      History of cholecystectomy          SOCIAL HISTORY  Alcohol:  Tobacco:  Illicit substance use:      FAMILY HISTORY:      LABS:                        8.1    8.96  )-----------( 269      ( 19 Mar 2024 05:39 )             22.6     03-20    135  |  111<H>  |  27<H>  ----------------------------<  90  5.0   |  19<L>  |  1.22    Ca    9.4      20 Mar 2024 07:15  Phos  3.8     03-18  Mg     1.9     03-18    TPro  7.9  /  Alb  3.3<L>  /  TBili  9.8<H>  /  DBili  x   /  AST  165<H>  /  ALT  87<H>  /  AlkPhos  133<H>  03-20      Urinalysis Basic - ( 20 Mar 2024 07:15 )    Color: x / Appearance: x / SG: x / pH: x  Gluc: 90 mg/dL / Ketone: x  / Bili: x / Urobili: x   Blood: x / Protein: x / Nitrite: x   Leuk Esterase: x / RBC: x / WBC x   Sq Epi: x / Non Sq Epi: x / Bacteria: x      CAPILLARY BLOOD GLUCOSE            Urinalysis Basic - ( 20 Mar 2024 07:15 )    Color: x / Appearance: x / SG: x / pH: x  Gluc: 90 mg/dL / Ketone: x  / Bili: x / Urobili: x   Blood: x / Protein: x / Nitrite: x   Leuk Esterase: x / RBC: x / WBC x   Sq Epi: x / Non Sq Epi: x / Bacteria: x        MEDICATIONS  (STANDING):  allopurinol 200 milliGRAM(s) Oral daily  folic acid 1 milliGRAM(s) Oral every 24 hours  heparin   Injectable 5000 Unit(s) SubCutaneous every 8 hours  lidocaine   4% Patch 2 Patch Transdermal daily  pantoprazole    Tablet 40 milliGRAM(s) Oral before breakfast  polyethylene glycol 3350 17 Gram(s) Oral daily  senna 2 Tablet(s) Oral at bedtime    MEDICATIONS  (PRN):  acetaminophen     Tablet .. 650 milliGRAM(s) Oral every 6 hours PRN Temp greater or equal to 38C (100.4F), Mild Pain (1 - 3)  HYDROmorphone  Injectable 2 milliGRAM(s) IV Push every 3 hours PRN Severe Pain (7 - 10)      REVIEW OF SYSTEMS:  CONSTITUTIONAL: No fever  EYES: No eye pain, visual disturbances  ENMT:   No sinus or throat pain  NECK: No pain   RESPIRATORY: No cough, wheezing; No shortness of breath  CARDIOVASCULAR: No chest pain, palpitations  GASTROINTESTINAL: No abdominal pain. No nausea, vomiting, No diarrhea or constipation. No melena or hematochezia.  GENITOURINARY: No dysuria  NEUROLOGICAL: No headaches  SKIN: No, rashes  MUSCULOSKELETAL: No joint pain     RADIOLOGY & ADDITIONAL TESTS:  < from: Xray Chest 1 View- PORTABLE-Urgent (Xray Chest 1 View- PORTABLE-Urgent .) (03.18.24 @ 06:44) >  ACC: [id number] EXAM:  XR CHEST PORTABLE URGENT 1V   ORDERED BY: ATIF MCGHEE     PROCEDURE DATE:  03/18/2024          INTERPRETATION:  AP chest on March 18, 2024 at 6:28 AM. Patient has a   sickle cell crisis.    Heart size is enlarged. Lungs remain clear. Right-sided port again noted.   Chest is similar to January 13.    IMPRESSION: No acute finding or change.    --- End of Report ---      RAMO WERNER MD; Attending Radiologist  This document has been electronically signed. Mar 18 2024  5:11PM    < end of copied text >  ACC: [id number] EXAM:  MR MRCP WAW IC   ORDERED BY: AZALEAH MARNI     PROCEDURE DATE:  03/18/2024          INTERPRETATION:  CLINICAL INFORMATION: Jaundice, known retroperitoneal   lymph nodes    COMPARISON: Ultrasound December 16, 2023, CT October 18, 2023    CONTRAST/COMPLICATIONS:  IV Contrast: Gadavist  9 cc administered   1 cc discarded  Oral Contrast: NONE  Complications: None reported at time of study completion    PROCEDURE:  MRI of the abdomen was performed.  MRCP was performed.    FINDINGS:  LOWER CHEST: Cardiomegaly.    LIVER: Cirrhotic morphology. Diffuse iron deposition.  BILE DUCTS: Normal caliber.  GALLBLADDER: Cholecystectomy.  SPLEEN: Markedly shrunken spleen, likely autoinfarcted.  PANCREAS: Within normal limits.  ADRENALS: Within normal limits.  KIDNEYS/URETERS: Low T2 signal in the renal cortices, likely due to iron   deposition. Small bilateral cysts.    VISUALIZED PORTIONS:  BOWEL: Within normal limits.  PERITONEUM: No ascites.  VESSELS: Within normal limits.  RETROPERITONEUM/LYMPH NODES: Stable periportal and periceliac   lymphadenopathy, for example a 2.8 x 1.6 cm periceliac node (4; 15).  ABDOMINAL WALL: Within normal limits.  BONES: No suspicious lesion. Stable T12 compression deformity.    IMPRESSION:  Iron deposition in the liver and kidneys compatible with sickle cell   disease, intravascular hemolysis and numerous transfusions.    No biliary ductal dilatation or hepatobiliary mass.    Stable upper abdominal lymphadenopathy.        --- End of Report ---        ACC: [id number] EXAM:  US URINARY BLADDER   ORDERED BY: CLAY VASQUEZ     ACC: [id number] EXAM:  US KIDNEY(S)   ORDERED BY: CLAY VASQUEZ     PROCEDURE DATE:  03/19/2024          INTERPRETATION:  CLINICAL INFORMATION: Hydronephrosis.    COMPARISON:None available.    TECHNIQUE: Sonography of the kidneys and bladder.    FINDINGS:  Right kidney: 12.9 cm. No renal mass, hydronephrosis or calculi. Fullness   of the upper pole calyx with a duplicated collecting system.    Left kidney: 10.9 cm. No renal mass, hydronephrosis or calculi. Fullness   of the lower pole calyx with a duplicated collecting system. 1.9 cm   midpole cyst.    Urinary bladder: Within normal limits. Prevoid volume was 234 cc and post   void volume was 13 cc.    IMPRESSION:  Fullness upper pole calyx of right and a lower pole calyx of the left   kidney with bilateral duplex collecting systems. No convincing evidence   of hydronephrosis.        --- End of Report ---      Imaging Personally Reviewed:  [x ] YES  [ ] NO    Consultant(s) Notes Reviewed:  [ x] YES  [ ] NO    PHYSICAL EXAM:  GENERAL: NAD  HEAD:  Atraumatic, Normocephalic  EYES EOMI, PERRLA, conjunctiva and sclera clear  ENMT:  Moist mucous membranes, Good dentition, No lesions  NECK: Supple  NERVOUS SYSTEM:  Alert & Oriented X3, Good concentration  CHEST/LUNG: CTA bilaterally; No rales, rhonchi, wheezing  HEART: Regular rate and rhythm  ABDOMEN: Soft, Nontender, Nondistended; Bowel sounds present  EXTREMITIES:  No clubbing, cyanosis, or edema  SKIN: No rashes    Care Collaborated Discussed with Consultants/Other Providers [x ] YES  [ ] NO

## 2024-03-20 NOTE — PROGRESS NOTE ADULT - PROBLEM SELECTOR PLAN 5
- History of Gout  - Patient on allopurinol and colchicine at home  - HOLD home gout meds  - trend uric acid level in am
- Patient with history of elevated LFts  - MRCP- performed no obstruction, iron deposit to lier and kidney  - trend CMP

## 2024-03-20 NOTE — DISCHARGE NOTE NURSING/CASE MANAGEMENT/SOCIAL WORK - NSDCPEFALRISK_GEN_ALL_CORE
For information on Fall & Injury Prevention, visit: [email or web address] OR  [email or web address] OR  https://www.cdc.gov/steadi/patient.html

## 2024-03-20 NOTE — PROGRESS NOTE ADULT - ASSESSMENT
TOLU VARGAS is a 43y Male who presents with a chief complaint of anemia    Sickle Cell Disease  ·	Patient follows with Dr. Shirley Jordan  ·	Baseline hemoglobin around 5-6. Maintain above 5 or transfuse if symptomatic.  ·	Elevated bilirubin likely a combination of liver disease, iron deposition, and ongoing sickle hemolysis. No evidence of mass of biliary ductal obstruction.  ·	Continue to monitor liver functions while admitted  ·	Optimize pain control. Rule out sepsis.     Will continue to follow.    Bala Pan MD  Hematology/Oncology  O: [phone]
TOLU VARGAS is a 43y Male who presents with a chief complaint of anemia    Sickle Cell Disease  ·	Patient follows with Dr. Shirley Jordan  ·	Baseline hemoglobin around 5-6. Maintain above 5 or transfuse if symptomatic.  ·	Elevated bilirubin likely a combination of liver disease, iron deposition, and ongoing sickle hemolysis. No evidence of mass of biliary ductal obstruction.  ·	Continue to monitor liver functions while admitted  ·	Optimize pain control. Rule out sepsis.   ·	today's hb 8.    ·	Pain has been improving.   ·	Patient noted to have positive blood cultures from the port.  Probably contaminant given patient is asymptomatic without fevers. Repeat peripheral was done.  DIscharge planning in progress.    Will continue to follow.  
#Symptomatic Anemia  # Sickle Cell Crisis  - reported baseline around 5-6. Transfuse to maintain baseline or for symptoms  - pain control  - 2/p 2 units prbc this admission    #MATA  - stable  - went for US renal today, followup results    #Transaminitis  - in the setting of active hemolysis  - need to trend BMP + hepatic function panel  - MRCP performed no obstruction    #Gout  - continue home meds    DC planning when pain controlled. Hgb stable.     
Patient is a 43 year old male from home, ambulates independently, with PMH of gout and sickle cell diease with chronic anemia and baseline Hb of 6 who presented to the ED generalized pain.  In the ED patients hemodynamically stable and afebrile.  Patient with leukocytosis to 13K and normocytic anemia to 5.8.  PTT elevated >200.  Patient also with MATA of 1.75 (baseline <1) and elevated liver enzymes which appear stable since prior admissions.  Patient is being admitted to medicine for symptomatic anemia and sickle cell pain crisis 
Search Terms: Alex Downey, 1980Search Date: 03/18/2024 09:11:26 AM  The Drug Utilization Report below displays all of the controlled substance prescriptions, if any, that your patient has filled in the last twelve months. The information displayed on this report is compiled from pharmacy submissions to the Department, and accurately reflects the information as submitted by the pharmacies.    This report was requested by: Meg Wilhelm | Reference #: [id number]    Practitioner Count: 1  Pharmacy Count: 1  Current Opioid Prescriptions: 1  Current Benzodiazepine Prescriptions: 0  Current Stimulant Prescriptions: 0      Patient Demographic Information (PDI)       PDI	First Name	Last Name	Birth Date	Gender	Street Address	City	State	Zip Code  A	Alex Downey	1980	Male	[address]	[place] [id number]    Prescription Information      PDI Filter:    PDI	My Rx	Current Rx	Drug Type	Rx Written	Rx Dispensed	Drug	Quantity	Days Supply	Prescriber Name	Prescriber NANCY #	Payment Method	Dispenser  A	N	Y	O	02/08/2024	02/20/2024	oxycodone hcl (ir) 30 mg tab	180	30	Jordan, Shirley SOLITARIO	[id number]	Medicaid	[place]   A	N	N	O	01/11/2024	01/16/2024	oxycodone hcl (ir) 30 mg tab	180	30	Jordan, Shirley SOLITARIO	[id number]	Medicaid	[place]   A	N	N	O	12/14/2023	12/18/2023	oxycodone hcl (ir) 30 mg tab	180	30	Jordan, Shirley SOLITARIO	[id number]	Medicaid	[place]   A	N	N	O	11/16/2023	11/17/2023	oxycodone hcl (ir) 30 mg tab	180	30	Jordan, Shirley SOLITARIO	[id number]	Medicaid	[place]   A	N	N	O	10/18/2023	10/20/2023	oxycodone hcl (ir) 30 mg tab	180	30	Jag Ayala	[id number]	Medicaid	[place]   A	N	N	O	09/14/2023	09/15/2023	oxycodone hcl (ir) 30 mg tab	180	45	Jordan, Shirley SOLITARIO	[id number]	Medicaid	[place]   A	N	N	O	08/17/2023	08/18/2023	oxycodone hcl (ir) 30 mg tab	180	30	Ambrose Laney	[id number]	Medicaid	[place]   A	N	N	O	07/20/2023	07/21/2023	oxycodone hcl (ir) 30 mg tab	180	30	[facility]	[id number]	Medicaid	[place]   A	N	N	O	06/22/2023	06/23/2023	oxycodone hcl (ir) 30 mg tab	180	30	Jordan, Shirley SOLITARIO	[id number]	Medicaid	[place]   A	N	N	O	04/20/2023	04/25/2023	oxycodone hcl (ir) 30 mg tab	180	30	Jordan, Shirley SOLITARIO	[id number]	Medicaid	[place]   A	N	N	O	03/23/2023	03/27/2023	oxycodone hcl (ir) 30 mg tab	180	30	Jordan, Shirley SOLITARIO	[id number]	[facility]	[place]     * - Details of Drug Type : O = Opioid, B = Benzodiazepine, S = Stimulant
Patient is a 43 year old male from home, ambulates independently, with PMH of gout and sickle cell diease with chronic anemia and baseline Hb of 6 who presented to the ED generalized pain.  In the ED patients hemodynamically stable and afebrile.  Patient with leukocytosis to 13K and normocytic anemia to 5.8.  PTT elevated >200.  Patient also with MATA of 1.75 (baseline <1) and elevated liver enzymes which appear stable since prior admissions.  Patient is being admitted to medicine for symptomatic anemia and sickle cell pain crisis 
Search Terms: Alex Downey, 1980Search Date: 03/18/2024 09:11:26 AM  The Drug Utilization Report below displays all of the controlled substance prescriptions, if any, that your patient has filled in the last twelve months. The information displayed on this report is compiled from pharmacy submissions to the Department, and accurately reflects the information as submitted by the pharmacies.    This report was requested by: Meg Wilhelm | Reference #: [id number]    Practitioner Count: 1  Pharmacy Count: 1  Current Opioid Prescriptions: 1  Current Benzodiazepine Prescriptions: 0  Current Stimulant Prescriptions: 0      Patient Demographic Information (PDI)       PDI	First Name	Last Name	Birth Date	Gender	Street Address	City	State	Zip Code  A	Alex Downey	1980	Male	[address]	[place] [id number]    Prescription Information      PDI Filter:    PDI	My Rx	Current Rx	Drug Type	Rx Written	Rx Dispensed	Drug	Quantity	Days Supply	Prescriber Name	Prescriber NANCY #	Payment Method	Dispenser  A	N	Y	O	02/08/2024	02/20/2024	oxycodone hcl (ir) 30 mg tab	180	30	Jordan, Shirley SOLITARIO	[id number]	Medicaid	[place]   A	N	N	O	01/11/2024	01/16/2024	oxycodone hcl (ir) 30 mg tab	180	30	Jordan, Shirley SOLITARIO	[id number]	Medicaid	[place]   A	N	N	O	12/14/2023	12/18/2023	oxycodone hcl (ir) 30 mg tab	180	30	Jordan, Shirley SOLITARIO	[id number]	Medicaid	[place]   A	N	N	O	11/16/2023	11/17/2023	oxycodone hcl (ir) 30 mg tab	180	30	Jordan, Shirley SOLITARIO	[id number]	Medicaid	[place]   A	N	N	O	10/18/2023	10/20/2023	oxycodone hcl (ir) 30 mg tab	180	30	Jag Ayala	[id number]	Medicaid	[place]   A	N	N	O	09/14/2023	09/15/2023	oxycodone hcl (ir) 30 mg tab	180	45	Jordan, Shirley SOLITARIO	[id number]	Medicaid	[place]   A	N	N	O	08/17/2023	08/18/2023	oxycodone hcl (ir) 30 mg tab	180	30	Ambrose Laney	[id number]	Medicaid	[place]   A	N	N	O	07/20/2023	07/21/2023	oxycodone hcl (ir) 30 mg tab	180	30	[facility]	[id number]	Medicaid	[place]   A	N	N	O	06/22/2023	06/23/2023	oxycodone hcl (ir) 30 mg tab	180	30	Jordan, Shirley SOLITARIO	[id number]	Medicaid	[place]   A	N	N	O	04/20/2023	04/25/2023	oxycodone hcl (ir) 30 mg tab	180	30	Jordan, Shirley SOLITARIO	[id number]	Medicaid	[place]   A	N	N	O	03/23/2023	03/27/2023	oxycodone hcl (ir) 30 mg tab	180	30	Jordan, Shirley SOLITARIO	[id number]	[facility]	[place]     * - Details of Drug Type : O = Opioid, B = Benzodiazepine, S = Stimulant

## 2024-03-20 NOTE — PHARMACOTHERAPY INTERVENTION NOTE - COMMENTS
[address]ven[address] reviewed no interventions at this time.     Culture Date/Time: 2024-03-18 14:10  BCID: -  Staphylococcus epidermidis  Detec

## 2024-03-20 NOTE — PROVIDER CONTACT NOTE (CRITICAL VALUE NOTIFICATION) - TEST AND RESULT REPORTED:
+ b/c from 3/18 : growth in aerobic bottle gram + cocci in clusters
Growth in Aerobic and Anaerobic Bottle: Growth positive Cocci in Clusters.

## 2024-03-20 NOTE — DISCHARGE NOTE PROVIDER - ATTENDING DISCHARGE PHYSICAL EXAMINATION:
Noacute distress, no obvious jaundice but has scleral icterus. Speaking complete sentences, comfortable. heart regular, lungs clear, abd soft nontender

## 2024-03-20 NOTE — DISCHARGE NOTE PROVIDER - CARE PROVIDER_API CALL
[facility]  Medical Oncology  [address] [place] [address]  Phone: [phone]  Fax: [phone]  Established Patient  Follow Up Time: Routine

## 2024-03-20 NOTE — CHART NOTE - NSCHARTNOTEFT_GEN_A_CORE
patient for discharge today, presented with sickle cell crisis. s/p 2 units prbc. Spoke with outpatient hematologist and update provided.

## 2024-03-20 NOTE — PROGRESS NOTE ADULT - PROBLEM SELECTOR PROBLEM 2
Sickle cell anemia with crisis

## 2024-03-25 LAB
CULTURE RESULTS: SIGNIFICANT CHANGE UP
SPECIMEN SOURCE: SIGNIFICANT CHANGE UP

## 2024-05-09 ENCOUNTER — INPATIENT (INPATIENT)
Facility: HOSPITAL | Age: 44
LOS: 1 days | Discharge: HOME | DRG: 812 | End: 2024-05-11
Attending: HOSPITALIST | Admitting: HOSPITALIST
Payer: MEDICAID

## 2024-05-09 VITALS
HEART RATE: 105 BPM | HEIGHT: 69 IN | TEMPERATURE: 100 F | WEIGHT: 203.05 LBS | DIASTOLIC BLOOD PRESSURE: 66 MMHG | OXYGEN SATURATION: 96 % | RESPIRATION RATE: 20 BRPM | SYSTOLIC BLOOD PRESSURE: 143 MMHG

## 2024-05-09 DIAGNOSIS — Z90.49 ACQUIRED ABSENCE OF OTHER SPECIFIED PARTS OF DIGESTIVE TRACT: Chronic | ICD-10-CM

## 2024-05-09 DIAGNOSIS — D64.9 ANEMIA, UNSPECIFIED: ICD-10-CM

## 2024-05-09 LAB
ALBUMIN SERPL ELPH-MCNC: 3.3 G/DL — LOW (ref 3.5–5)
ALP SERPL-CCNC: 162 U/L — HIGH (ref 40–120)
ALT FLD-CCNC: 71 U/L DA — HIGH (ref 10–60)
ANION GAP SERPL CALC-SCNC: 4 MMOL/L — LOW (ref 5–17)
AST SERPL-CCNC: 167 U/L — HIGH (ref 10–40)
BASOPHILS # BLD AUTO: 0.12 K/UL — SIGNIFICANT CHANGE UP (ref 0–0.2)
BASOPHILS NFR BLD AUTO: 0.8 % — SIGNIFICANT CHANGE UP (ref 0–2)
BILIRUB SERPL-MCNC: 7.1 MG/DL — HIGH (ref 0.2–1.2)
BUN SERPL-MCNC: 26 MG/DL — HIGH (ref 7–18)
CALCIUM SERPL-MCNC: 8.9 MG/DL — SIGNIFICANT CHANGE UP (ref 8.4–10.5)
CHLORIDE SERPL-SCNC: 105 MMOL/L — SIGNIFICANT CHANGE UP (ref 96–108)
CO2 SERPL-SCNC: 23 MMOL/L — SIGNIFICANT CHANGE UP (ref 22–31)
CREAT SERPL-MCNC: 1.51 MG/DL — HIGH (ref 0.5–1.3)
EGFR: 58 ML/MIN/1.73M2 — LOW
EOSINOPHIL # BLD AUTO: 0.07 K/UL — SIGNIFICANT CHANGE UP (ref 0–0.5)
EOSINOPHIL NFR BLD AUTO: 0.5 % — SIGNIFICANT CHANGE UP (ref 0–6)
GLUCOSE SERPL-MCNC: 105 MG/DL — HIGH (ref 70–99)
HCT VFR BLD CALC: 15.5 % — CRITICAL LOW (ref 39–50)
HGB BLD-MCNC: 5.6 G/DL — CRITICAL LOW (ref 13–17)
IMM GRANULOCYTES NFR BLD AUTO: 0.8 % — SIGNIFICANT CHANGE UP (ref 0–0.9)
LDH SERPL L TO P-CCNC: 1257 U/L — HIGH (ref 120–225)
LYMPHOCYTES # BLD AUTO: 25.1 % — SIGNIFICANT CHANGE UP (ref 13–44)
LYMPHOCYTES # BLD AUTO: 3.58 K/UL — HIGH (ref 1–3.3)
MCHC RBC-ENTMCNC: 32.6 PG — SIGNIFICANT CHANGE UP (ref 27–34)
MCHC RBC-ENTMCNC: 36.1 GM/DL — HIGH (ref 32–36)
MCV RBC AUTO: 90.1 FL — SIGNIFICANT CHANGE UP (ref 80–100)
MONOCYTES # BLD AUTO: 1.73 K/UL — HIGH (ref 0–0.9)
MONOCYTES NFR BLD AUTO: 12.1 % — SIGNIFICANT CHANGE UP (ref 2–14)
NEUTROPHILS # BLD AUTO: 8.66 K/UL — HIGH (ref 1.8–7.4)
NEUTROPHILS NFR BLD AUTO: 60.7 % — SIGNIFICANT CHANGE UP (ref 43–77)
NRBC # BLD: 1 /100 WBCS — HIGH (ref 0–0)
PLATELET # BLD AUTO: 316 K/UL — SIGNIFICANT CHANGE UP (ref 150–400)
POTASSIUM SERPL-MCNC: 5 MMOL/L — SIGNIFICANT CHANGE UP (ref 3.5–5.3)
POTASSIUM SERPL-SCNC: 5 MMOL/L — SIGNIFICANT CHANGE UP (ref 3.5–5.3)
PROT SERPL-MCNC: 7.6 G/DL — SIGNIFICANT CHANGE UP (ref 6–8.3)
RBC # BLD: 1.72 M/UL — LOW (ref 4.2–5.8)
RBC # BLD: 1.72 M/UL — LOW (ref 4.2–5.8)
RBC # FLD: 23 % — HIGH (ref 10.3–14.5)
RETICS #: 325.6 K/UL — HIGH (ref 25–125)
RETICS/RBC NFR: 18.9 % — HIGH (ref 0.5–2.5)
SODIUM SERPL-SCNC: 132 MMOL/L — LOW (ref 135–145)
WBC # BLD: 14.27 K/UL — HIGH (ref 3.8–10.5)
WBC # FLD AUTO: 14.27 K/UL — HIGH (ref 3.8–10.5)

## 2024-05-09 PROCEDURE — 99223 1ST HOSP IP/OBS HIGH 75: CPT

## 2024-05-09 PROCEDURE — 99285 EMERGENCY DEPT VISIT HI MDM: CPT

## 2024-05-09 PROCEDURE — 71045 X-RAY EXAM CHEST 1 VIEW: CPT | Mod: 26

## 2024-05-09 RX ORDER — ONDANSETRON 8 MG/1
4 TABLET, FILM COATED ORAL EVERY 8 HOURS
Refills: 0 | Status: DISCONTINUED | OUTPATIENT
Start: 2024-05-09 | End: 2024-05-11

## 2024-05-09 RX ORDER — ACETAMINOPHEN 500 MG
650 TABLET ORAL EVERY 6 HOURS
Refills: 0 | Status: DISCONTINUED | OUTPATIENT
Start: 2024-05-09 | End: 2024-05-11

## 2024-05-09 RX ORDER — SODIUM CHLORIDE 9 MG/ML
1000 INJECTION INTRAMUSCULAR; INTRAVENOUS; SUBCUTANEOUS ONCE
Refills: 0 | Status: COMPLETED | OUTPATIENT
Start: 2024-05-09 | End: 2024-05-09

## 2024-05-09 RX ORDER — DIPHENHYDRAMINE HCL 50 MG
25 CAPSULE ORAL ONCE
Refills: 0 | Status: COMPLETED | OUTPATIENT
Start: 2024-05-09 | End: 2024-05-09

## 2024-05-09 RX ORDER — SENNA PLUS 8.6 MG/1
2 TABLET ORAL AT BEDTIME
Refills: 0 | Status: DISCONTINUED | OUTPATIENT
Start: 2024-05-09 | End: 2024-05-11

## 2024-05-09 RX ORDER — HYDROMORPHONE HYDROCHLORIDE 2 MG/ML
2 INJECTION INTRAMUSCULAR; INTRAVENOUS; SUBCUTANEOUS
Refills: 0 | Status: DISCONTINUED | OUTPATIENT
Start: 2024-05-09 | End: 2024-05-11

## 2024-05-09 RX ORDER — LIDOCAINE 4 G/100G
1 CREAM TOPICAL DAILY
Refills: 0 | Status: DISCONTINUED | OUTPATIENT
Start: 2024-05-09 | End: 2024-05-10

## 2024-05-09 RX ORDER — OXYCODONE HYDROCHLORIDE 5 MG/1
1 TABLET ORAL
Refills: 0 | DISCHARGE

## 2024-05-09 RX ORDER — POLYETHYLENE GLYCOL 3350 17 G/17G
17 POWDER, FOR SOLUTION ORAL DAILY
Refills: 0 | Status: DISCONTINUED | OUTPATIENT
Start: 2024-05-09 | End: 2024-05-11

## 2024-05-09 RX ORDER — LANOLIN ALCOHOL/MO/W.PET/CERES
3 CREAM (GRAM) TOPICAL AT BEDTIME
Refills: 0 | Status: DISCONTINUED | OUTPATIENT
Start: 2024-05-09 | End: 2024-05-11

## 2024-05-09 RX ORDER — OMEPRAZOLE 10 MG/1
1 CAPSULE, DELAYED RELEASE ORAL
Refills: 0 | DISCHARGE

## 2024-05-09 RX ORDER — HYDROMORPHONE HYDROCHLORIDE 2 MG/ML
1 INJECTION INTRAMUSCULAR; INTRAVENOUS; SUBCUTANEOUS ONCE
Refills: 0 | Status: DISCONTINUED | OUTPATIENT
Start: 2024-05-09 | End: 2024-05-09

## 2024-05-09 RX ORDER — NALOXONE HYDROCHLORIDE 4 MG/.1ML
0.4 SPRAY NASAL ONCE
Refills: 0 | Status: DISCONTINUED | OUTPATIENT
Start: 2024-05-09 | End: 2024-05-11

## 2024-05-09 RX ADMIN — HYDROMORPHONE HYDROCHLORIDE 1 MILLIGRAM(S): 2 INJECTION INTRAMUSCULAR; INTRAVENOUS; SUBCUTANEOUS at 02:00

## 2024-05-09 RX ADMIN — SODIUM CHLORIDE 1000 MILLILITER(S): 9 INJECTION INTRAMUSCULAR; INTRAVENOUS; SUBCUTANEOUS at 22:47

## 2024-05-09 RX ADMIN — HYDROMORPHONE HYDROCHLORIDE 1 MILLIGRAM(S): 2 INJECTION INTRAMUSCULAR; INTRAVENOUS; SUBCUTANEOUS at 22:48

## 2024-05-09 RX ADMIN — Medication 25 MILLIGRAM(S): at 22:48

## 2024-05-09 NOTE — H&P ADULT - NSHPREVIEWOFSYSTEMS_GEN_ALL_CORE
CONSTITUTIONAL: Generalized pain  RESPIRATORY:  No shortness of breath  CARDIOVASCULAR: No chest pain  GASTROINTESTINAL: Abdominal pain.  GENITOURINARY: No dysuria   NEUROLOGICAL: No headaches,  ENDOCRINE: No polyuria  musculoskeletal No muscle aches  HEME: no easy bruisability  SKIN: No itching, burning, rashes, or lesions .

## 2024-05-09 NOTE — H&P ADULT - NSHPPHYSICALEXAM_GEN_ALL_CORE
GENERAL: NAD, well-groomed, well-developed  HEAD:  Atraumatic, Normocephalic  EYES: Jaundiced  ENMT: No tonsillar erythema, exudates, or enlargement;   NECK: Supple, normal appearance  NERVOUS SYSTEM:  Alert & Oriented X3,  Moves all extremities spontaneously  CHEST/LUNG: Lungs clear to auscultation bilaterally, No rales, rhonchi, wheezing   HEART: Regular rate and rhythm; No murmurs, rubs, or gallops  ABDOMEN: Soft, Nontender, Nondistended; Bowel sounds present  EXTREMITIES:  2+ Peripheral Pulses, No clubbing, cyanosis, or edema  SKIN: No rashes or lesions;  Good capillary refill

## 2024-05-09 NOTE — H&P ADULT - PROBLEM SELECTOR PLAN 5
Holding chemical ppx given anemia and MATA, SCDs Likely reactive from sickle cell  MRCP March 2024 shows: Iron deposition in the liver and kidneys compatible with sickle cell   disease, intravascular hemolysis and numerous transfusions.

## 2024-05-09 NOTE — ED PROVIDER NOTE - PROGRESS NOTE DETAILS
h/h - 5.6/15.5  pt consented for transfusion.  CXR with no acute infiltrates.  pt still with pain crisis.  pt to be admitted.

## 2024-05-09 NOTE — ED ADULT NURSE NOTE - NSFALLUNIVINTERV_ED_ALL_ED
Bed/Stretcher in lowest position, wheels locked, appropriate side rails in place/Call bell, personal items and telephone in reach/Instruct patient to call for assistance before getting out of bed/chair/stretcher/Non-slip footwear applied when patient is off stretcher/[place] to call system/Physically safe environment - no spills, clutter or unnecessary equipment/Purposeful proactive rounding/Room/bathroom lighting operational, light cord in reach

## 2024-05-09 NOTE — ED PROVIDER NOTE - CROS ED MUSC ALL NEG
- - - Partial Purse String (Simple) Text: Given the location of the defect and the characteristics of the surrounding skin a simple purse string closure was deemed most appropriate.  Undermining was performed circumfirentially around the surgical defect.  A purse string suture was then placed and tightened. Wound tension only allowed a partial closure of the circular defect.

## 2024-05-09 NOTE — H&P ADULT - ATTENDING COMMENTS
Vital Signs Last 24 Hrs  T(C): 36.7 (10 May 2024 03:39), Max: 37.6 (09 May 2024 20:22)  T(F): 98.1 (10 May 2024 03:39), Max: 99.7 (09 May 2024 20:22)  HR: 82 (10 May 2024 03:39) (82 - 105)  BP: 136/78 (10 May 2024 03:39) (127/75 - 153/82)  BP(mean): 98 (10 May 2024 03:39) (92 - 106)  RR: 19 (10 May 2024 03:39) (19 - 20)  SpO2: 95% (10 May 2024 03:39) (91% - 96%)  Parameters below as of 10 May 2024 03:39  Patient On (Oxygen Delivery Method): nasal cannula  O2 Flow (L/min): 4    Labs reviewed  wbc 14.3k  hgb 5.6  retic count and %  elevated  Na - 132  BUN 26  Cr - 1.51  TB 7.1 - baseline     ALT 71 - all at baseline  LDH - 1257    Impression   43 year old man with hx of sickle cell disease with gout, chronic anemia with chronic liver disease 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  Plan is for pain control and judicious transfusion of a unit of packed red cells  Dr Jordan, hematologist, will be consulted  med reconciliation

## 2024-05-09 NOTE — H&P ADULT - ASSESSMENT
43 year old male from home, ambulates independently, with PMH of gout and sickle cell diease with chronic anemia and baseline Hb of 6 who presented to the ED generalized pain. He has had more frequent sickle cell crisis from yearly to every 2-3 months. Was transitioned to Oxbryta (received shipement on Tuesday but did not start yet). Sent from Dr. Jordan's office for worsening pain and low Hgb  Patient with leukocytosis to 14K and H/H 5.6/15.5   Patient also with MATA of 1.51 (baseline <1) and elevated liver enzymes which appear stable since prior admissions. Being admitted for symptomatic anemia and sickle cell crisis

## 2024-05-09 NOTE — H&P ADULT - PROBLEM SELECTOR PLAN 2
p/w generalized pain weakness  Plan:  Pain control; resuming prior pain management recs  [ ] Dilaudid 2mg IV Q3  [ ] Consult Pain Management  Upon DC Start: Oxbryta (voxelotor) is a hemoglobin S (HbS) polymerization inhibitor that binds to HbS with a 1:1 stoichiometry and exhibits preferential partitioning to red blood cells (RBCs). p/w generalized pain weakness  Plan:  Pain control; resuming prior pain management recs  [ ] Dilaudid 2mg IV Q3  [ ] Consult Pain Management if refractory  Upon DC Start: Oxbryta (voxelotor) hemoglobin S (HbS) polymerization inhibitor

## 2024-05-09 NOTE — ED PROVIDER NOTE - CLINICAL SUMMARY MEDICAL DECISION MAKING FREE TEXT BOX
43-year-old male history of sickle cell disease presents with painful crises.  Of note patient also reporting low hemoglobin saying he was sent by his oncologist for transfusion.  Patient also reporting intermittent episodes of chest pain.  Afebrile here in the ED.  Lungs clear.  Given chest pain concern for acute chest syndrome.  Will get labs including type and screen, chest x-ray, analgesia, reassess

## 2024-05-09 NOTE — ED ADULT NURSE NOTE - ED STAT RN HANDOFF DETAILS
Received pt awake, alert and oriented x4. IV access to port on right chest wall noted. Transported to [facility] in no distress.

## 2024-05-09 NOTE — H&P ADULT - PROBLEM SELECTOR PLAN 1
p/w generalized pain weakness  Hg 5  receiving 2 units pRBC  f/u with Dr. Jordan outpatient hematologist   Trend CBC p/w generalized pain weakness  Hgb 5.6  receiving 2 units pRBC  f/u with Dr. Jordan outpatient hematologist   Trend CBC

## 2024-05-09 NOTE — ED PROVIDER NOTE - OBJECTIVE STATEMENT
43-year-old male history of sickle cell disease history of cholecystectomy in the past presents with anemia.  As per patient he followed up with his hematologist today Dr. Jordan and was told his hemoglobin was low and he needed a blood transfusion.  Patient also reporting having a sickle cell crisis with pain in his joints.  As per patient this is a typical sickle cell crisis.  Patient is reporting some chest discomfort associated with his crisis.
DISPLAY PLAN FREE TEXT

## 2024-05-09 NOTE — ED PROVIDER NOTE - CARE PLAN
1 Principal Discharge DX:	Symptomatic anemia  Secondary Diagnosis:	Anemia, sickle cell with crisis

## 2024-05-09 NOTE — H&P ADULT - HISTORY OF PRESENT ILLNESS
43 year old male from home, ambulates independently, with PMH of gout and sickle cell diease with chronic anemia and baseline Hb of 6 who presented to the ED generalized pain. He has had more frequent sickle cell crisis from yearly to every 2-3 months. Was transitioned to Oxbryta (received shipement on Tuesday but did not start yet). Sent from Dr. Jordan's office for worsening pain and low Hgb    Denies fevers, chills, bloody BMs    In the ED patients hemodynamically stable and afebrile.  Patient with leukocytosis to 14K and H/H 5.6/15.5   Patient also with MATA of 1.51 (baseline <1) and elevated liver enzymes which appear stable since prior admissions.

## 2024-05-10 DIAGNOSIS — D57.00 HB-SS DISEASE WITH CRISIS, UNSPECIFIED: ICD-10-CM

## 2024-05-10 DIAGNOSIS — N17.9 ACUTE KIDNEY FAILURE, UNSPECIFIED: ICD-10-CM

## 2024-05-10 DIAGNOSIS — R74.01 ELEVATION OF LEVELS OF LIVER TRANSAMINASE LEVELS: ICD-10-CM

## 2024-05-10 DIAGNOSIS — Z75.8 OTHER PROBLEMS RELATED TO MEDICAL FACILITIES AND OTHER HEALTH CARE: ICD-10-CM

## 2024-05-10 DIAGNOSIS — R79.89 OTHER SPECIFIED ABNORMAL FINDINGS OF BLOOD CHEMISTRY: ICD-10-CM

## 2024-05-10 DIAGNOSIS — Z29.9 ENCOUNTER FOR PROPHYLACTIC MEASURES, UNSPECIFIED: ICD-10-CM

## 2024-05-10 DIAGNOSIS — M10.9 GOUT, UNSPECIFIED: ICD-10-CM

## 2024-05-10 DIAGNOSIS — D64.9 ANEMIA, UNSPECIFIED: ICD-10-CM

## 2024-05-10 DIAGNOSIS — R09.89 OTHER SPECIFIED SYMPTOMS AND SIGNS INVOLVING THE CIRCULATORY AND RESPIRATORY SYSTEMS: ICD-10-CM

## 2024-05-10 LAB
ALBUMIN SERPL ELPH-MCNC: 3.2 G/DL — LOW (ref 3.5–5)
ALP SERPL-CCNC: 145 U/L — HIGH (ref 40–120)
ALT FLD-CCNC: 66 U/L DA — HIGH (ref 10–60)
ANION GAP SERPL CALC-SCNC: 4 MMOL/L — LOW (ref 5–17)
ANISOCYTOSIS BLD QL: SLIGHT — SIGNIFICANT CHANGE UP
AST SERPL-CCNC: 154 U/L — HIGH (ref 10–40)
BASOPHILS # BLD AUTO: 0 K/UL — SIGNIFICANT CHANGE UP (ref 0–0.2)
BASOPHILS NFR BLD AUTO: 0 % — SIGNIFICANT CHANGE UP (ref 0–2)
BILIRUB SERPL-MCNC: 6.8 MG/DL — HIGH (ref 0.2–1.2)
BUN SERPL-MCNC: 26 MG/DL — HIGH (ref 7–18)
CALCIUM SERPL-MCNC: 8.5 MG/DL — SIGNIFICANT CHANGE UP (ref 8.4–10.5)
CHLORIDE SERPL-SCNC: 107 MMOL/L — SIGNIFICANT CHANGE UP (ref 96–108)
CO2 SERPL-SCNC: 25 MMOL/L — SIGNIFICANT CHANGE UP (ref 22–31)
CREAT SERPL-MCNC: 1.32 MG/DL — HIGH (ref 0.5–1.3)
EGFR: 68 ML/MIN/1.73M2 — SIGNIFICANT CHANGE UP
ELLIPTOCYTES BLD QL SMEAR: SLIGHT — SIGNIFICANT CHANGE UP
EOSINOPHIL # BLD AUTO: 0 K/UL — SIGNIFICANT CHANGE UP (ref 0–0.5)
EOSINOPHIL NFR BLD AUTO: 0 % — SIGNIFICANT CHANGE UP (ref 0–6)
GLUCOSE SERPL-MCNC: 105 MG/DL — HIGH (ref 70–99)
HCT VFR BLD CALC: 18.2 % — CRITICAL LOW (ref 39–50)
HCT VFR BLD CALC: 21.3 % — LOW (ref 39–50)
HGB BLD-MCNC: 6.5 G/DL — CRITICAL LOW (ref 13–17)
HGB BLD-MCNC: 7.8 G/DL — LOW (ref 13–17)
HYPOCHROMIA BLD QL: SIGNIFICANT CHANGE UP
LYMPHOCYTES # BLD AUTO: 36 % — SIGNIFICANT CHANGE UP (ref 13–44)
LYMPHOCYTES # BLD AUTO: 4.61 K/UL — HIGH (ref 1–3.3)
MANUAL SMEAR VERIFICATION: SIGNIFICANT CHANGE UP
MCHC RBC-ENTMCNC: 30.6 PG — SIGNIFICANT CHANGE UP (ref 27–34)
MCHC RBC-ENTMCNC: 31.3 PG — SIGNIFICANT CHANGE UP (ref 27–34)
MCHC RBC-ENTMCNC: 35.7 GM/DL — SIGNIFICANT CHANGE UP (ref 32–36)
MCHC RBC-ENTMCNC: 36.6 GM/DL — HIGH (ref 32–36)
MCV RBC AUTO: 83.5 FL — SIGNIFICANT CHANGE UP (ref 80–100)
MCV RBC AUTO: 87.5 FL — SIGNIFICANT CHANGE UP (ref 80–100)
MONOCYTES # BLD AUTO: 0.9 K/UL — SIGNIFICANT CHANGE UP (ref 0–0.9)
MONOCYTES NFR BLD AUTO: 7 % — SIGNIFICANT CHANGE UP (ref 2–14)
NEUTROPHILS # BLD AUTO: 7.3 K/UL — SIGNIFICANT CHANGE UP (ref 1.8–7.4)
NEUTROPHILS NFR BLD AUTO: 57 % — SIGNIFICANT CHANGE UP (ref 43–77)
NRBC # BLD: 0 /100 WBCS — SIGNIFICANT CHANGE UP (ref 0–0)
NRBC # BLD: 0 /100 WBCS — SIGNIFICANT CHANGE UP (ref 0–0)
OVALOCYTES BLD QL SMEAR: SLIGHT — SIGNIFICANT CHANGE UP
PLAT MORPH BLD: NORMAL — SIGNIFICANT CHANGE UP
PLATELET # BLD AUTO: 277 K/UL — SIGNIFICANT CHANGE UP (ref 150–400)
PLATELET # BLD AUTO: 301 K/UL — SIGNIFICANT CHANGE UP (ref 150–400)
PLATELET COUNT - ESTIMATE: NORMAL — SIGNIFICANT CHANGE UP
POIKILOCYTOSIS BLD QL AUTO: SIGNIFICANT CHANGE UP
POLYCHROMASIA BLD QL SMEAR: SIGNIFICANT CHANGE UP
POTASSIUM SERPL-MCNC: 4.5 MMOL/L — SIGNIFICANT CHANGE UP (ref 3.5–5.3)
POTASSIUM SERPL-SCNC: 4.5 MMOL/L — SIGNIFICANT CHANGE UP (ref 3.5–5.3)
PROT SERPL-MCNC: 7.4 G/DL — SIGNIFICANT CHANGE UP (ref 6–8.3)
RBC # BLD: 2.08 M/UL — LOW (ref 4.2–5.8)
RBC # BLD: 2.55 M/UL — LOW (ref 4.2–5.8)
RBC # FLD: 21.2 % — HIGH (ref 10.3–14.5)
RBC # FLD: 21.9 % — HIGH (ref 10.3–14.5)
RBC BLD AUTO: ABNORMAL
SCHISTOCYTES BLD QL AUTO: SLIGHT — SIGNIFICANT CHANGE UP
SICKLE CELLS BLD QL SMEAR: SLIGHT — SIGNIFICANT CHANGE UP
SODIUM SERPL-SCNC: 136 MMOL/L — SIGNIFICANT CHANGE UP (ref 135–145)
STOMATOCYTES BLD QL SMEAR: SLIGHT — SIGNIFICANT CHANGE UP
TARGETS BLD QL SMEAR: SLIGHT — SIGNIFICANT CHANGE UP
URATE SERPL-MCNC: 7.4 MG/DL — SIGNIFICANT CHANGE UP (ref 3.4–8.8)
WBC # BLD: 12.8 K/UL — HIGH (ref 3.8–10.5)
WBC # BLD: 9.63 K/UL — SIGNIFICANT CHANGE UP (ref 3.8–10.5)
WBC # FLD AUTO: 12.8 K/UL — HIGH (ref 3.8–10.5)
WBC # FLD AUTO: 9.63 K/UL — SIGNIFICANT CHANGE UP (ref 3.8–10.5)

## 2024-05-10 PROCEDURE — 99233 SBSQ HOSP IP/OBS HIGH 50: CPT

## 2024-05-10 PROCEDURE — 99222 1ST HOSP IP/OBS MODERATE 55: CPT

## 2024-05-10 RX ORDER — DIPHENHYDRAMINE HCL 50 MG
25 CAPSULE ORAL ONCE
Refills: 0 | Status: COMPLETED | OUTPATIENT
Start: 2024-05-10 | End: 2024-05-10

## 2024-05-10 RX ORDER — PANTOPRAZOLE SODIUM 20 MG/1
40 TABLET, DELAYED RELEASE ORAL
Refills: 0 | Status: DISCONTINUED | OUTPATIENT
Start: 2024-05-10 | End: 2024-05-11

## 2024-05-10 RX ORDER — ALLOPURINOL 300 MG
200 TABLET ORAL DAILY
Refills: 0 | Status: DISCONTINUED | OUTPATIENT
Start: 2024-05-10 | End: 2024-05-11

## 2024-05-10 RX ORDER — ACETAMINOPHEN 500 MG
1000 TABLET ORAL ONCE
Refills: 0 | Status: COMPLETED | OUTPATIENT
Start: 2024-05-10 | End: 2024-05-10

## 2024-05-10 RX ORDER — LIDOCAINE 4 G/100G
2 CREAM TOPICAL DAILY
Refills: 0 | Status: DISCONTINUED | OUTPATIENT
Start: 2024-05-10 | End: 2024-05-11

## 2024-05-10 RX ORDER — FOLIC ACID 0.8 MG
1 TABLET ORAL EVERY 24 HOURS
Refills: 0 | Status: DISCONTINUED | OUTPATIENT
Start: 2024-05-10 | End: 2024-05-11

## 2024-05-10 RX ADMIN — HYDROMORPHONE HYDROCHLORIDE 2 MILLIGRAM(S): 2 INJECTION INTRAMUSCULAR; INTRAVENOUS; SUBCUTANEOUS at 05:00

## 2024-05-10 RX ADMIN — HYDROMORPHONE HYDROCHLORIDE 2 MILLIGRAM(S): 2 INJECTION INTRAMUSCULAR; INTRAVENOUS; SUBCUTANEOUS at 18:50

## 2024-05-10 RX ADMIN — HYDROMORPHONE HYDROCHLORIDE 2 MILLIGRAM(S): 2 INJECTION INTRAMUSCULAR; INTRAVENOUS; SUBCUTANEOUS at 08:09

## 2024-05-10 RX ADMIN — HYDROMORPHONE HYDROCHLORIDE 2 MILLIGRAM(S): 2 INJECTION INTRAMUSCULAR; INTRAVENOUS; SUBCUTANEOUS at 03:13

## 2024-05-10 RX ADMIN — Medication 400 MILLIGRAM(S): at 01:21

## 2024-05-10 RX ADMIN — Medication 1 MILLIGRAM(S): at 05:40

## 2024-05-10 RX ADMIN — LIDOCAINE 1 PATCH: 4 CREAM TOPICAL at 20:00

## 2024-05-10 RX ADMIN — Medication 1000 MILLIGRAM(S): at 03:13

## 2024-05-10 RX ADMIN — LIDOCAINE 1 PATCH: 4 CREAM TOPICAL at 11:13

## 2024-05-10 RX ADMIN — HYDROMORPHONE HYDROCHLORIDE 2 MILLIGRAM(S): 2 INJECTION INTRAMUSCULAR; INTRAVENOUS; SUBCUTANEOUS at 21:45

## 2024-05-10 RX ADMIN — HYDROMORPHONE HYDROCHLORIDE 2 MILLIGRAM(S): 2 INJECTION INTRAMUSCULAR; INTRAVENOUS; SUBCUTANEOUS at 01:21

## 2024-05-10 RX ADMIN — Medication 25 MILLIGRAM(S): at 01:21

## 2024-05-10 RX ADMIN — Medication 200 MILLIGRAM(S): at 11:13

## 2024-05-10 RX ADMIN — PANTOPRAZOLE SODIUM 40 MILLIGRAM(S): 20 TABLET, DELAYED RELEASE ORAL at 05:40

## 2024-05-10 RX ADMIN — HYDROMORPHONE HYDROCHLORIDE 2 MILLIGRAM(S): 2 INJECTION INTRAMUSCULAR; INTRAVENOUS; SUBCUTANEOUS at 06:57

## 2024-05-10 RX ADMIN — SENNA PLUS 2 TABLET(S): 8.6 TABLET ORAL at 21:16

## 2024-05-10 RX ADMIN — SODIUM CHLORIDE 1000 MILLILITER(S): 9 INJECTION INTRAMUSCULAR; INTRAVENOUS; SUBCUTANEOUS at 03:14

## 2024-05-10 RX ADMIN — HYDROMORPHONE HYDROCHLORIDE 2 MILLIGRAM(S): 2 INJECTION INTRAMUSCULAR; INTRAVENOUS; SUBCUTANEOUS at 11:13

## 2024-05-10 RX ADMIN — LIDOCAINE 1 PATCH: 4 CREAM TOPICAL at 23:37

## 2024-05-10 RX ADMIN — HYDROMORPHONE HYDROCHLORIDE 2 MILLIGRAM(S): 2 INJECTION INTRAMUSCULAR; INTRAVENOUS; SUBCUTANEOUS at 21:13

## 2024-05-10 RX ADMIN — HYDROMORPHONE HYDROCHLORIDE 2 MILLIGRAM(S): 2 INJECTION INTRAMUSCULAR; INTRAVENOUS; SUBCUTANEOUS at 14:50

## 2024-05-10 RX ADMIN — HYDROMORPHONE HYDROCHLORIDE 2 MILLIGRAM(S): 2 INJECTION INTRAMUSCULAR; INTRAVENOUS; SUBCUTANEOUS at 17:56

## 2024-05-10 RX ADMIN — HYDROMORPHONE HYDROCHLORIDE 2 MILLIGRAM(S): 2 INJECTION INTRAMUSCULAR; INTRAVENOUS; SUBCUTANEOUS at 12:00

## 2024-05-10 RX ADMIN — Medication 25 MILLIGRAM(S): at 11:24

## 2024-05-10 NOTE — PROGRESS NOTE ADULT - SUBJECTIVE AND OBJECTIVE BOX
NP Note discussed with  Primary Attending    INTERVAL HPI/OVERNIGHT EVENTS: seen at bedside, pt states feeling better, pain improving after medication. no other complaints.     MEDICATIONS  (STANDING):  allopurinol 200 milliGRAM(s) Oral daily  folic acid 1 milliGRAM(s) Oral every 24 hours  lidocaine   4% Patch 2 Patch Transdermal daily  naloxone Injectable 0.4 milliGRAM(s) IV Push once  pantoprazole    Tablet 40 milliGRAM(s) Oral before breakfast  polyethylene glycol 3350 17 Gram(s) Oral daily  senna 2 Tablet(s) Oral at bedtime    MEDICATIONS  (PRN):  acetaminophen     Tablet .. 650 milliGRAM(s) Oral every 6 hours PRN Temp greater or equal to 38C (100.4F), Mild Pain (1 - 3)  aluminum hydroxide/magnesium hydroxide/simethicone Suspension 30 milliLiter(s) Oral every 4 hours PRN Dyspepsia  HYDROmorphone  Injectable 2 milliGRAM(s) IV Push every 3 hours PRN Severe Pain (7 - 10)  melatonin 3 milliGRAM(s) Oral at bedtime PRN Insomnia  ondansetron Injectable 4 milliGRAM(s) IV Push every 8 hours PRN Nausea and/or Vomiting      __________________________________________________  REVIEW OF SYSTEMS:    CONSTITUTIONAL: No fever,   EYES: no acute visual disturbances  NECK: No pain or stiffness  RESPIRATORY: No cough; No shortness of breath  CARDIOVASCULAR: No chest pain, no palpitations  GASTROINTESTINAL: No pain. No nausea or vomiting; No diarrhea   NEUROLOGICAL: No headache or numbness, no tremors  MUSCULOSKELETAL: joint pain 4/10  GENITOURINARY: no dysuria, no frequency, no hesitancy  PSYCHIATRY: no depression , no anxiety  ALL OTHER  ROS negative        Vital Signs Last 24 Hrs  T(C): 36.5 (10 May 2024 11:09), Max: 37.6 (09 May 2024 20:22)  T(F): 97.7 (10 May 2024 11:09), Max: 99.7 (09 May 2024 20:22)  HR: 74 (10 May 2024 11:09) (74 - 105)  BP: 130/74 (10 May 2024 11:09) (127/75 - 153/82)  BP(mean): 98 (10 May 2024 03:39) (92 - 106)  RR: 18 (10 May 2024 11:09) (15 - 20)  SpO2: 94% (10 May 2024 11:09) (91% - 96%)    Parameters below as of 10 May 2024 11:09  Patient On (Oxygen Delivery Method): room air        ________________________________________________  PHYSICAL EXAM:  GENERAL: NAD, right chest IV port placed in ED  HEENT: Normocephalic;  conjunctivae and sclerae clear; moist mucous membranes;   NECK : supple  CHEST/LUNG: Clear to auscultation bilaterally with good air entry   HEART: S1 S2  regular; no murmurs, gallops or rubs  ABDOMEN: Soft, Nontender, Nondistended; Bowel sounds present  EXTREMITIES: no cyanosis; no edema; no calf tenderness  SKIN: warm and dry; no rash  NERVOUS SYSTEM:  Awake and alert; Oriented  to place, person and time ; no new deficits    _________________________________________________  LABS:                        6.5    12.80 )-----------( 301      ( 10 May 2024 05:26 )             18.2     05-10    136  |  107  |  26<H>  ----------------------------<  105<H>  4.5   |  25  |  1.32<H>    Ca    8.5      10 May 2024 05:26    TPro  7.4  /  Alb  3.2<L>  /  TBili  6.8<H>  /  DBili  x   /  AST  154<H>  /  ALT  66<H>  /  AlkPhos  145<H>  05-10      Urinalysis Basic - ( 10 May 2024 05:26 )    Color: x / Appearance: x / SG: x / pH: x  Gluc: 105 mg/dL / Ketone: x  / Bili: x / Urobili: x   Blood: x / Protein: x / Nitrite: x   Leuk Esterase: x / RBC: x / WBC x   Sq Epi: x / Non Sq Epi: x / Bacteria: x      CAPILLARY BLOOD GLUCOSE    RADIOLOGY & ADDITIONAL TESTS:    Imaging  Reviewed:  YES    < from: Xray Chest 1 View- PORTABLE-Urgent (Xray Chest 1 View- PORTABLE-Urgent .) (05.09.24 @ 23:02) >    ACC: [id number] EXAM:  XR CHEST PORTABLE URGENT 1V   ORDERED BY: MARIA EUGENIA QUIROS     PROCEDURE DATE:  05/09/2024          INTERPRETATION:  AP chest radiograph    COMPARISON: 3/18/2024 chest x-ray.    CLINICAL INFORMATION: Chest Pain.    FINDINGS:  CATHETERS AND TUBES: Mediport catheter tip in SVC.    PULMONARY:  Mild pulmonary vascular congestion without airspace consolidation or   pleural effusion.  No pneumothorax..    HEART/VASCULAR: The  heart is enlarged in transverse diameter. .    BONES: The visualized osseous thorax is intact.    IMPRESSION:  Cardiomegaly.  Pulmonary vascular congestion. No airspace consolidation or effusion.    --- End of Report ---    RAMO MORALES MD; Attending Radiologist  This document has been electronically signed. May 10 2024  8:46AM    < end of copied text >  Consultant(s) Notes Reviewed:   YES      Plan of care was discussed with patient and /or primary care giver; all questions and concerns were addressed

## 2024-05-10 NOTE — PROGRESS NOTE ADULT - PROBLEM SELECTOR PLAN 2
p/w generalized pain weakness  Pain control; resuming prior pain management recs  c/w Dilaudid 2mg IV Q3  Pain Management consulted  Upon DC Start: Oxbryta (voxelotor) hemoglobin S (HbS) polymerization inhibitor

## 2024-05-10 NOTE — CONSULT NOTE ADULT - ASSESSMENT
Confidential Drug Utilization Report  Search Terms: macey gonzales, 1980Search Date: 05/10/2024 10:51:32 AM  The Drug Utilization Report below displays all of the controlled substance prescriptions, if any, that your patient has filled in the last twelve months. The information displayed on this report is compiled from pharmacy submissions to the Department, and accurately reflects the information as submitted by the pharmacies.    This report was requested by: Lina Cintron | Reference #: [id number]    You have not added a NANCY number. Keeping your NANCY number(s) up to date on the My NANCY # tab will enable the separation of your prescriptions from others in the search results.    Practitioner Count: 1  Pharmacy Count: 1  Current Opioid Prescriptions: 1  Current Benzodiazepine Prescriptions: 0  Current Stimulant Prescriptions: 0      Patient Demographic Information (PDI)       PDI	First Name	Last Name	Birth Date	Gender	Street Address	[facility]	Zip Code  A	Macey Gonzales	1980	Male	[phone]	[facility]	[address]    Prescription Information      PDI Filter:    PDI	Current Rx	Drug Type	Rx Written	Rx Dispensed	Drug	Quantity	Days Supply  A	Y	O	04/11/2024	04/19/2024	oxycodone hcl (ir) 30 mg tab	180	30  Prescriber Name Shirley Jordan MD  Prescriber NANCY # [id number]  Payment Method Medicaid  Dispenser [place]   A	N	O	03/14/2024	03/25/2024	oxycodone hcl (ir) 30 mg tab	180	30  Prescriber Name Shirley Jordan MD  Prescriber NANCY # [id number]  Payment Method Medicaid  Dispenser [place]   A	N	O	02/08/2024	02/20/2024	oxycodone hcl (ir) 30 mg tab	180	30  Prescriber Name Shirley Jordan MD  Prescriber NANCY # [id number]  Payment Method Medicaid  Dispenser [place]   A	N	O	01/11/2024	01/16/2024	oxycodone hcl (ir) 30 mg tab	180	30  Prescriber Name Shirley Jordan MD  Prescriber NANCY # [id number]  Payment Method Medicaid  Dispenser [place]   A	N	O	12/14/2023	12/18/2023	oxycodone hcl (ir) 30 mg tab	180	30  Prescriber Name Shirley Jordan MD  Prescriber NANCY # [id number]  Payment Method Medicaid  Dispenser [place]   A	N	O	11/16/2023	11/17/2023	oxycodone hcl (ir) 30 mg tab	180	30  Prescriber Name Shirley Jordan MD  Prescriber NANCY # [id number]  Payment Method Medicaid  Dispenser [place]   A	N	O	10/18/2023	10/20/2023	oxycodone hcl (ir) 30 mg tab	180	30  Prescriber Name Ayala Jag  Prescriber NANCY # [id number]  Payment Method Medicaid  Dispenser [place]   A	N	O	09/14/2023	09/15/2023	oxycodone hcl (ir) 30 mg tab	180	45  Prescriber Name Shirley Jordan MD  Prescriber NANCY # [id number]  Payment Method Medicaid  Dispenser [place]   A	N	O	08/17/2023	08/18/2023	oxycodone hcl (ir) 30 mg tab	180	30  Prescriber Name Leoncio Boggsna  Prescriber NANCY # [id number]  Payment Method Medicaid  Dispenser [place]   A	N	O	07/20/2023	07/21/2023	oxycodone hcl (ir) 30 mg tab	180	30  Prescriber Name KatybrookLaney kerns  Prescriber NANCY # [id number]  Payment Method Medicaid  Dispenser [place]   A	N	O	06/22/2023	06/23/2023	oxycodone hcl (ir) 30 mg tab	180	30  Prescriber Name Shirley Jordan MD  Prescriber NANCY # [id number]  Payment Method Medicaid  Dispenser [place]     * - Details of Drug Type : O = Opioid, B = Benzodiazepine, S = Stimulant    * - Drugs marked with an asterisk are compound drugs. If the compound drug is made up of more than one controlled substance, then each controlled substance will be a separate row in the table.
VOC --   Discussed with patient today, symptoms consistent with acute vaso-occlusive crisis.  He received 1 unit of packed red blood cell in the ER and his hemoglobin today is up to 6.5.  His bilirubin is 6.8, which is actually better than his previous bilirubin whenever he was admitted to the hospital.  I believe he may do well with just 1 unit of packed red blood cell but patient feels that he needs a 2nd unit.   I believe that should be okay, patient understands that the more he becomes transfused, the heart rate is in the future to find a match, the more he may have iron overload.  --  Monitor CBC, minimize transfusion after the 2nd unit  -- already noted to have some vascular congestion on chest x-ray, may have fluid overload with the 2nd unit, monitor closely for symptoms of fluid overload, may need IV Lasix  -- no symptoms of acute chest syndrome for now, continue monitoring clinically  -- monitor CMP and bilirubin daily  -- continue pain control, patient states that it has been adequate so far  -- continue following up with Dr. Jordan in the office after discharge

## 2024-05-10 NOTE — PROGRESS NOTE ADULT - ASSESSMENT
43 year old male from home, ambulates independently, with PMH of gout and sickle cell diease with chronic anemia and baseline Hb of 6 who presented to the ED generalized pain. He has had more frequent sickle cell crisis from yearly to every 2-3 months. Was transitioned to Oxbryta (received shipement on Tuesday but did not start yet). Sent from Dr. Jordan's office for worsening pain and low Hgb  Patient with leukocytosis to 14K and H/H 5.6/15.5   Patient also with MATA of 1.51 (baseline <1) and elevated liver enzymes which appear stable since prior admissions.   Pt admitted for symptomatic anemia and sickle cell crisis. Hgb 5.6, ordered 2 units PRBC. pain management and heme/onc consulted.

## 2024-05-10 NOTE — PROGRESS NOTE ADULT - PROBLEM SELECTOR PLAN 1
p/w generalized pain weakness  f/u with Dr. Jordan outpatient hematologist   Hgb 5.6  ordered 2 units pRBC, will require benadryl prior  Trend CBC post Tx  Heme/onc consulted, reccs minimize transfusion after the 2nd unit risk of volume overload

## 2024-05-10 NOTE — PROGRESS NOTE ADULT - PROBLEM SELECTOR PLAN 3
CXR shows Pulmonary vascular congestion. No airspace consolidation or effusion.  Asymptomatic  May fluid overload with the 2nd unit pRBC  monitor closely for symptoms of fluid overload, may need IV Lasix if symptomatic

## 2024-05-10 NOTE — PROGRESS NOTE ADULT - PROBLEM SELECTOR PLAN 6
Likely reactive from sickle cell  MRCP March 2024 shows: Iron deposition in the liver and kidneys compatible with sickle cell   disease, intravascular hemolysis and numerous transfusions.  Monitor LFT.

## 2024-05-10 NOTE — CONSULT NOTE ADULT - SUBJECTIVE AND OBJECTIVE BOX
Reason for consult:    HPI:  43 year old male from home, ambulates independently, with PMH of gout and sickle cell diease with chronic anemia and baseline Hb of 6 who presented to the ED generalized pain. He has had more frequent sickle cell crisis from yearly to every 2-3 months. Was transitioned to Oxbryta (received shipement on Tuesday but did not start yet). Sent from Dr. Jordan's office for worsening pain and low Hgb    Denies fevers, chills, bloody BMs    In the ED patients hemodynamically stable and afebrile.  Patient with leukocytosis to 14K and H/H 5.6/15.5   Patient also with MATA of 1.51 (baseline <1) and elevated liver enzymes which appear stable since prior admissions.  (09 May 2024 23:59)      PAST MEDICAL & SURGICAL HISTORY:  Sickle cell anemia      Gout      History of cholecystectomy          FAMILY HISTORY:  Family history of sickle cell trait (Father, Mother)    Patient's father is  (Father)    Patient's mother is  (Mother)        Alochol: Denied  Smoking: Nonsmoker  Drug Use: Denied  Marital Status:         Allergies    piperacillin-tazobactam (Urticaria)  hydroxyurea (Other)  ceftriaxone (Anaphylaxis)  penicillin (Pruritus)    Intolerances        MEDICATIONS  (STANDING):  allopurinol 200 milliGRAM(s) Oral daily  folic acid 1 milliGRAM(s) Oral every 24 hours  lidocaine   4% Patch 1 Patch Transdermal daily  lidocaine   4% Patch 2 Patch Transdermal daily  naloxone Injectable 0.4 milliGRAM(s) IV Push once  pantoprazole    Tablet 40 milliGRAM(s) Oral before breakfast  polyethylene glycol 3350 17 Gram(s) Oral daily  senna 2 Tablet(s) Oral at bedtime    MEDICATIONS  (PRN):  acetaminophen     Tablet .. 650 milliGRAM(s) Oral every 6 hours PRN Temp greater or equal to 38C (100.4F), Mild Pain (1 - 3)  aluminum hydroxide/magnesium hydroxide/simethicone Suspension 30 milliLiter(s) Oral every 4 hours PRN Dyspepsia  HYDROmorphone  Injectable 2 milliGRAM(s) IV Push every 3 hours PRN Severe Pain (7 - 10)  melatonin 3 milliGRAM(s) Oral at bedtime PRN Insomnia  ondansetron Injectable 4 milliGRAM(s) IV Push every 8 hours PRN Nausea and/or Vomiting      ROS  No fever, sweats, chills  No epistaxis, HA, sore throat  No CP, SOB, cough, sputum  No n/v/d, abd pain, melena, hematochezia  No edema  No rash  No anxiety  No back pain, joint pain  No bleeding, bruising  No dysuria, hematuria    T(C): 36.5 (05-10-24 @ 11:09), Max: 37.6 (24 @ 20:22)  HR: 74 (05-10-24 @ 11:09) (74 - 105)  BP: 130/74 (05-10-24 @ 11:09) (127/75 - 153/82)  RR: 18 (05-10-24 @ 11:09) (15 - 20)  SpO2: 94% (05-10-24 @ 11:09) (91% - 96%)  Wt(kg): --    PE  NAD  Awake, alert  Anicteric, MMM  RRR  CTAB  Abd soft, NT, ND  No c/c/e  No rash grossly  FROM                          6.5    12.80 )-----------( 301      ( 10 May 2024 05:26 )             18.2       05-10    136  |  107  |  26<H>  ----------------------------<  105<H>  4.5   |  25  |  1.32<H>    Ca    8.5      10 May 2024 05:26    TPro  7.4  /  Alb  3.2<L>  /  TBili  6.8<H>  /  DBili  x   /  AST  154<H>  /  ALT  66<H>  /  AlkPhos  145<H>  05-10   Pt known to our service, pt of Dr Jordan. 43 year old male from home, ambulates independently, with PMH of gout and sickle cell diease with chronic anemia and baseline Hb of 6 who presented to the ED generalized pain. He has had more frequent sickle cell crisis from yearly to every 2-3 months. Was transitioned to Oxbryta, though just received the medication, has not started on it yet.  He arrived with a hemoglobin of 5.6, received 1 unit in the ER, pending 2nd unit at this time.  Feels better, no shortness of breath, no fever.  No coughing.  He has diffuse pain, consistent with previous vaso-occlusive crisis.  Of note, his chest x-ray showed vascular congestion, he is asymptomatic on room air.      PAST MEDICAL & SURGICAL HISTORY:  Sickle cell anemia      Gout      History of cholecystectomy          FAMILY HISTORY:  Family history of sickle cell trait (Father, Mother)    Patient's father is  (Father)    Patient's mother is  (Mother)        Alochol: Denied  Smoking: Nonsmoker  Drug Use: Denied  Marital Status:         Allergies    piperacillin-tazobactam (Urticaria)  hydroxyurea (Other)  ceftriaxone (Anaphylaxis)  penicillin (Pruritus)    Intolerances        MEDICATIONS  (STANDING):  allopurinol 200 milliGRAM(s) Oral daily  folic acid 1 milliGRAM(s) Oral every 24 hours  lidocaine   4% Patch 1 Patch Transdermal daily  lidocaine   4% Patch 2 Patch Transdermal daily  naloxone Injectable 0.4 milliGRAM(s) IV Push once  pantoprazole    Tablet 40 milliGRAM(s) Oral before breakfast  polyethylene glycol 3350 17 Gram(s) Oral daily  senna 2 Tablet(s) Oral at bedtime    MEDICATIONS  (PRN):  acetaminophen     Tablet .. 650 milliGRAM(s) Oral every 6 hours PRN Temp greater or equal to 38C (100.4F), Mild Pain (1 - 3)  aluminum hydroxide/magnesium hydroxide/simethicone Suspension 30 milliLiter(s) Oral every 4 hours PRN Dyspepsia  HYDROmorphone  Injectable 2 milliGRAM(s) IV Push every 3 hours PRN Severe Pain (7 - 10)  melatonin 3 milliGRAM(s) Oral at bedtime PRN Insomnia  ondansetron Injectable 4 milliGRAM(s) IV Push every 8 hours PRN Nausea and/or Vomiting      ROS  No fever, sweats, chills  No epistaxis, HA, sore throat  No CP, SOB, cough, sputum  No n/v/d, abd pain, melena, hematochezia  No edema  No rash  No anxiety  No bleeding, bruising  No dysuria, hematuria    T(C): 36.5 (05-10-24 @ 11:09), Max: 37.6 (24 @ 20:22)  HR: 74 (05-10-24 @ 11:09) (74 - 105)  BP: 130/74 (05-10-24 @ 11:09) (127/75 - 153/82)  RR: 18 (05-10-24 @ 11:09) (15 - 20)  SpO2: 94% (05-10-24 @ 11:09) (91% - 96%)  Wt(kg): --    PE  NAD  Awake, alert  jaundiced and significant icterus                          6.5    12.80 )-----------( 301      ( 10 May 2024 05:26 )             18.2       05-10    136  |  107  |  26<H>  ----------------------------<  105<H>  4.5   |  25  |  1.32<H>    Ca    8.5      10 May 2024 05:26    TPro  7.4  /  Alb  3.2<L>  /  TBili  6.8<H>  /  DBili  x   /  AST  154<H>  /  ALT  66<H>  /  AlkPhos  145<H>  05-10

## 2024-05-10 NOTE — CONSULT NOTE ADULT - PROBLEM SELECTOR RECOMMENDATION 9
Pt with acute lumbar back and rib pain which is somatic in nature due to sickle cell pain crisis. Retic 18.9.    Promote fluids.   Opioid pain recommendations   - Continue dilaudid 2mg IV q3 hours PRN severe pain. Monitor for sedation/ respiratory depression.   Non-opioid pain recommendations   - Avoid NSAIDs  - Avoid Acetaminophen   - Lidoderm 4% 2 patches daily.   Bowel Regimen  - Continue Miralax 17G PO daily  - Continue Senna 2 tablets at bedtime for constipation  Mild pain (score 1-3)  - Non-pharmacological pain treatment recommendations  - Warm/ Cool packs PRN   - Repositioning, imagery, relaxation, distraction.  - Physical therapy OOB if no contraindications   Recommendations discussed with primary team and RN

## 2024-05-10 NOTE — PROGRESS NOTE ADULT - NS ATTEND AMEND GEN_ALL_CORE FT
Patient seen and examined. Case discussed with STACY Velazquez. Patient reports that his pain remains in the ribs and the lower back. He does feel that the dilaudid is helping him and does not wish for any adjustment in his pain medications. He is about to receive a 2nd unit of PRBC and reports to me that his baseline H/H is between 7-8. We discussed the risks and benefits of transfusion, especially in light of the iron overload seen on the MRI from 3/24. Patient wishes to proceed with the transfusion. He reports not having a BM yet since admission but patient only admitted overnight. Will continue senna and Miralax for now and monitor closely given the significant amount of narcotics he is receiving. Appreciate hematology and pain management follow-up. Remaining care as noted above.

## 2024-05-10 NOTE — CONSULT NOTE ADULT - SUBJECTIVE AND OBJECTIVE BOX
Source of information: TOLU VARGAS, Chart review  Patient language: English  : n/a    HPI:  43 year old male from home, ambulates independently, with PMH of gout and sickle cell diease with chronic anemia and baseline Hb of 6 who presented to the ED generalized pain. He has had more frequent sickle cell crisis from yearly to every 2-3 months. Was transitioned to Oxbryta (received shipement on Tuesday but did not start yet). Sent from Dr. Jordan's office for worsening pain and low Hgb    Denies fevers, chills, bloody BMs    In the ED patients hemodynamically stable and afebrile.  Patient with leukocytosis to 14K and H/H 5.6/15.5   Patient also with MATA of 1.51 (baseline <1) and elevated liver enzymes which appear stable since prior admissions.  (09 May 2024 23:59)      Pt is admitted for sickle cell crisis. Retic 18.9. Pain consulted for sickle cell pain 5/10. Pt well known to pain service. Last seen in 3/2024. Pt seen and examined at bedside. Pt laying in bed, reports lumbar back and b/l rib pain score 8/10 tolerable with current pain regimen SCALE USED: (1-10 VNRS). Pt describes pain as constant, sharp, non- radiating, alleviated by pain medication, exacerbated by movement and palpation. Pt tolerating PO diet. Denies lethargy, chest pain, SOB, nausea, vomiting, constipation. Reports last BM  . Patient stated goal for pain control: to be able to take deep breaths, get out of bed to chair and ambulate with tolerable pain control. Pt denies taking medications for pain at home.     PAST MEDICAL & SURGICAL HISTORY:  Sickle cell anemia      Gout      History of cholecystectomy          FAMILY HISTORY:  Family history of sickle cell trait (Father, Mother)    Patient's father is  (Father)    Patient's mother is  (Mother)        Social History:   [ X] Denies ETOH use, illicit drug use and smoking    Allergies    piperacillin-tazobactam (Urticaria)  hydroxyurea (Other)  ceftriaxone (Anaphylaxis)  penicillin (Pruritus)    MEDICATIONS  (STANDING):  allopurinol 200 milliGRAM(s) Oral daily  diphenhydrAMINE Injectable 25 milliGRAM(s) IV Push once  folic acid 1 milliGRAM(s) Oral every 24 hours  lidocaine   4% Patch 1 Patch Transdermal daily  naloxone Injectable 0.4 milliGRAM(s) IV Push once  pantoprazole    Tablet 40 milliGRAM(s) Oral before breakfast  polyethylene glycol 3350 17 Gram(s) Oral daily  senna 2 Tablet(s) Oral at bedtime    MEDICATIONS  (PRN):  acetaminophen     Tablet .. 650 milliGRAM(s) Oral every 6 hours PRN Temp greater or equal to 38C (100.4F), Mild Pain (1 - 3)  aluminum hydroxide/magnesium hydroxide/simethicone Suspension 30 milliLiter(s) Oral every 4 hours PRN Dyspepsia  HYDROmorphone  Injectable 2 milliGRAM(s) IV Push every 3 hours PRN Severe Pain (7 - 10)  melatonin 3 milliGRAM(s) Oral at bedtime PRN Insomnia  ondansetron Injectable 4 milliGRAM(s) IV Push every 8 hours PRN Nausea and/or Vomiting      Vital Signs Last 24 Hrs  T(C): 36.6 (10 May 2024 08:31), Max: 37.6 (09 May 2024 20:22)  T(F): 97.8 (10 May 2024 08:31), Max: 99.7 (09 May 2024 20:22)  HR: 75 (10 May 2024 08:31) (75 - 105)  BP: 150/91 (10 May 2024 08:31) (127/75 - 153/82)  BP(mean): 98 (10 May 2024 03:39) (92 - 106)  RR: 15 (10 May 2024 08:31) (15 - 20)  SpO2: 93% (10 May 2024 08:31) (91% - 96%)    Parameters below as of 10 May 2024 08:31  Patient On (Oxygen Delivery Method): room air        LABS: Reviewed.                          6.5    12.80 )-----------( 301      ( 10 May 2024 05:26 )             18.2     05-10    136  |  107  |  26<H>  ----------------------------<  105<H>  4.5   |  25  |  1.32<H>    Ca    8.5      10 May 2024 05:26    TPro  7.4  /  Alb  3.2<L>  /  TBili  6.8<H>  /  DBili  x   /  AST  154<H>  /  ALT  66<H>  /  AlkPhos  145<H>  05-10      LIVER FUNCTIONS - ( 10 May 2024 05:26 )  Alb: 3.2 g/dL / Pro: 7.4 g/dL / ALK PHOS: 145 U/L / ALT: 66 U/L DA / AST: 154 U/L / GGT: x           Urinalysis Basic - ( 10 May 2024 05:26 )    Color: x / Appearance: x / SG: x / pH: x  Gluc: 105 mg/dL / Ketone: x  / Bili: x / Urobili: x   Blood: x / Protein: x / Nitrite: x   Leuk Esterase: x / RBC: x / WBC x   Sq Epi: x / Non Sq Epi: x / Bacteria: x    SARS-CoV-2: NotDetec (15 Dec 2023 09:57)    Radiology: Reviewed.   < from: Xray Chest 1 View- PORTABLE-Urgent (Xray Chest 1 View- PORTABLE-Urgent .) (24 @ 23:02) >  ACC: [id number] EXAM:  XR CHEST PORTABLE URGENT 1V   ORDERED BY: MARIA EUGENIA QUIROS     PROCEDURE DATE:  2024          INTERPRETATION:  AP chest radiograph    COMPARISON: 3/18/2024 chest x-ray.    CLINICAL INFORMATION: Chest Pain.    FINDINGS:  CATHETERS AND TUBES: Mediport catheter tip in SVC.    PULMONARY:  Mild pulmonary vascular congestion without airspace consolidation or   pleural effusion.  No pneumothorax..    HEART/VASCULAR: The  heart is enlarged in transverse diameter. .    BONES: The visualized osseous thorax is intact.    IMPRESSION:  Cardiomegaly.  Pulmonary vascular congestion. No airspace consolidation or effusion.    --- End of Report ---            RAMO MORALES MD; Attending Radiologist  This document has been electronically signed. May 10 2024  8:46AM    < end of copied text >      ORT Score -   Family Hx of substance abuse	Female	      Male  Alcohol 	                                           1                     3  Illegal drugs	                                   2                     3  Rx drugs                                           4 	                  4  Personal Hx of substance abuse		  Alcohol 	                                          3	                  3  Illegal drugs                                     4	                  4  Rx drugs                                            5 	                  5  Age between 16- 45 years	           1                     1  hx preadolescent sexual abuse	   3 	                  0  Psychological disease		  ADD, OCD, bipolar, schizophrenia   2	          2  Depression                                           1 	          1  Total: 1    a score of 3 or lower indicates low risk for opioid abuse		  a score of 4-7 indicates moderate risk for opioid abuse		  a score of 8 or higher indicates high risk for opioid abuse    REVIEW OF SYSTEMS:  CONSTITUTIONAL: No fever or fatigue  HEENT:  No difficulty hearing, no change in vision  NECK: No pain or stiffness  RESPIRATORY: No cough, wheezing, chills or hemoptysis; No shortness of breath  CARDIOVASCULAR: No chest pain, palpitations, dizziness, or leg swelling  GASTROINTESTINAL: No loss of appetite, decreased PO intake. No abdominal or epigastric pain. No nausea, vomiting; No diarrhea or constipation.   GENITOURINARY: No dysuria, frequency, hematuria, retention or incontinence  MUSCULOSKELETAL: + b/l rib and lumbar back pain. No joint swelling; no upper or lower motor strength weakness, no saddle anesthesia, bowel/bladder incontinence, no falls   NEURO: No headaches, No numbness/tingling b/l LE, No weakness    PHYSICAL EXAM:  GENERAL:  Alert & Oriented X4, cooperative, NAD, Good concentration. Speech is clear.   RESPIRATORY: Respirations even and unlabored. Clear to auscultation bilaterally; No rales, rhonchi, wheezing, or rubs  CARDIOVASCULAR: Normal S1/S2, regular rate and rhythm; No murmurs, rubs, or gallops. No JVD. + right chest wall mediport accessed, dressing c/d/i   GASTROINTESTINAL:  Soft, Nontender, Nondistended; Bowel sounds present  PERIPHERAL VASCULAR:  Extremities warm without edema. 2+ Peripheral Pulses, No cyanosis, No calf tenderness  MUSCULOSKELETAL: Motor Strength 5/5 B/L upper and lower extremities;+ lumbar back tenderness on palpation   SKIN: Warm, dry, intact. No rashes, lesions, scars or wounds. + b/l icterus     Risk factors associated with adverse outcomes related to opioid treatment  [ ]  Concurrent benzodiazepine use  [ ]  History/ Active substance use or alcohol use disorder  [ ] Psychiatric co-morbidity  [ ] Sleep apnea  [ ] COPD  [ ] BMI> 35  [X ] Liver dysfunction  [ ] Renal dysfunction  [ ] CHF  [ ] Smoker  [ ]  Age > 60 years    [X ]  [place]  Reviewed and Copied to Chart. See below.    Plan of care and goal oriented pain management treatment options were discussed with patient and /or primary care giver; all questions and concerns were addressed and care was aligned with patient's wishes.    Educated patient on goal oriented pain management treatment options

## 2024-05-10 NOTE — PATIENT PROFILE ADULT - NSPROGENPREVTRANSFREACT_GEN_A_NUR
GENERAL: Awake, alert, NAD  LUNGS: CTAB, no wheezes or crackles   CARDIAC: RRR, no m/r/gn Nonreproducible chest pain along the sternum  ABDOMEN: Soft,  non tender, non distended, no rebound, no guarding  BACK: No midline spinal tenderness, no CVA tenderness  EXT: No edema, no calf tenderness,  NEURO: A&Ox3. Moving all extremities.  SKIN: Warm and dry. No rash.  PSYCH: Normal affect. DIFFICULTIES BREATHING

## 2024-05-11 ENCOUNTER — TRANSCRIPTION ENCOUNTER (OUTPATIENT)
Age: 44
End: 2024-05-11

## 2024-05-11 VITALS
OXYGEN SATURATION: 96 % | SYSTOLIC BLOOD PRESSURE: 159 MMHG | TEMPERATURE: 98 F | RESPIRATION RATE: 17 BRPM | DIASTOLIC BLOOD PRESSURE: 81 MMHG | HEART RATE: 79 BPM

## 2024-05-11 LAB
ALBUMIN SERPL ELPH-MCNC: 3 G/DL — LOW (ref 3.5–5)
ALP SERPL-CCNC: 147 U/L — HIGH (ref 40–120)
ALT FLD-CCNC: 70 U/L DA — HIGH (ref 10–60)
ANION GAP SERPL CALC-SCNC: 7 MMOL/L — SIGNIFICANT CHANGE UP (ref 5–17)
AST SERPL-CCNC: 163 U/L — HIGH (ref 10–40)
BASOPHILS # BLD AUTO: 0.15 K/UL — SIGNIFICANT CHANGE UP (ref 0–0.2)
BASOPHILS NFR BLD AUTO: 1.4 % — SIGNIFICANT CHANGE UP (ref 0–2)
BILIRUB DIRECT SERPL-MCNC: 4.7 MG/DL — HIGH (ref 0–0.3)
BILIRUB INDIRECT FLD-MCNC: 3.4 MG/DL — HIGH (ref 0.2–1)
BILIRUB SERPL-MCNC: 8.1 MG/DL — HIGH (ref 0.2–1.2)
BUN SERPL-MCNC: 25 MG/DL — HIGH (ref 7–18)
CALCIUM SERPL-MCNC: 8.6 MG/DL — SIGNIFICANT CHANGE UP (ref 8.4–10.5)
CHLORIDE SERPL-SCNC: 109 MMOL/L — HIGH (ref 96–108)
CO2 SERPL-SCNC: 20 MMOL/L — LOW (ref 22–31)
CREAT SERPL-MCNC: 1.2 MG/DL — SIGNIFICANT CHANGE UP (ref 0.5–1.3)
EGFR: 76 ML/MIN/1.73M2 — SIGNIFICANT CHANGE UP
EOSINOPHIL # BLD AUTO: 0.34 K/UL — SIGNIFICANT CHANGE UP (ref 0–0.5)
EOSINOPHIL NFR BLD AUTO: 3.2 % — SIGNIFICANT CHANGE UP (ref 0–6)
GLUCOSE SERPL-MCNC: 87 MG/DL — SIGNIFICANT CHANGE UP (ref 70–99)
HCT VFR BLD CALC: 22.7 % — LOW (ref 39–50)
HGB BLD-MCNC: 8.1 G/DL — LOW (ref 13–17)
IMM GRANULOCYTES NFR BLD AUTO: 0.4 % — SIGNIFICANT CHANGE UP (ref 0–0.9)
LYMPHOCYTES # BLD AUTO: 2.49 K/UL — SIGNIFICANT CHANGE UP (ref 1–3.3)
LYMPHOCYTES # BLD AUTO: 23.3 % — SIGNIFICANT CHANGE UP (ref 13–44)
MCHC RBC-ENTMCNC: 30.8 PG — SIGNIFICANT CHANGE UP (ref 27–34)
MCHC RBC-ENTMCNC: 35.7 GM/DL — SIGNIFICANT CHANGE UP (ref 32–36)
MCV RBC AUTO: 86.3 FL — SIGNIFICANT CHANGE UP (ref 80–100)
MONOCYTES # BLD AUTO: 1.11 K/UL — HIGH (ref 0–0.9)
MONOCYTES NFR BLD AUTO: 10.4 % — SIGNIFICANT CHANGE UP (ref 2–14)
NEUTROPHILS # BLD AUTO: 6.57 K/UL — SIGNIFICANT CHANGE UP (ref 1.8–7.4)
NEUTROPHILS NFR BLD AUTO: 61.3 % — SIGNIFICANT CHANGE UP (ref 43–77)
NRBC # BLD: 0 /100 WBCS — SIGNIFICANT CHANGE UP (ref 0–0)
PLATELET # BLD AUTO: 285 K/UL — SIGNIFICANT CHANGE UP (ref 150–400)
POTASSIUM SERPL-MCNC: 4.8 MMOL/L — SIGNIFICANT CHANGE UP (ref 3.5–5.3)
POTASSIUM SERPL-SCNC: 4.8 MMOL/L — SIGNIFICANT CHANGE UP (ref 3.5–5.3)
PROT SERPL-MCNC: 7.4 G/DL — SIGNIFICANT CHANGE UP (ref 6–8.3)
RBC # BLD: 2.63 M/UL — LOW (ref 4.2–5.8)
RBC # FLD: 21.9 % — HIGH (ref 10.3–14.5)
SODIUM SERPL-SCNC: 136 MMOL/L — SIGNIFICANT CHANGE UP (ref 135–145)
WBC # BLD: 10.7 K/UL — HIGH (ref 3.8–10.5)
WBC # FLD AUTO: 10.7 K/UL — HIGH (ref 3.8–10.5)

## 2024-05-11 PROCEDURE — 80076 HEPATIC FUNCTION PANEL: CPT

## 2024-05-11 PROCEDURE — 71045 X-RAY EXAM CHEST 1 VIEW: CPT

## 2024-05-11 PROCEDURE — 36430 TRANSFUSION BLD/BLD COMPNT: CPT

## 2024-05-11 PROCEDURE — 86850 RBC ANTIBODY SCREEN: CPT

## 2024-05-11 PROCEDURE — 80053 COMPREHEN METABOLIC PANEL: CPT

## 2024-05-11 PROCEDURE — 36415 COLL VENOUS BLD VENIPUNCTURE: CPT

## 2024-05-11 PROCEDURE — 86901 BLOOD TYPING SEROLOGIC RH(D): CPT

## 2024-05-11 PROCEDURE — 83615 LACTATE (LD) (LDH) ENZYME: CPT

## 2024-05-11 PROCEDURE — 86900 BLOOD TYPING SEROLOGIC ABO: CPT

## 2024-05-11 PROCEDURE — 84550 ASSAY OF BLOOD/URIC ACID: CPT

## 2024-05-11 PROCEDURE — 80048 BASIC METABOLIC PNL TOTAL CA: CPT

## 2024-05-11 PROCEDURE — 96375 TX/PRO/DX INJ NEW DRUG ADDON: CPT

## 2024-05-11 PROCEDURE — 99239 HOSP IP/OBS DSCHRG MGMT >30: CPT

## 2024-05-11 PROCEDURE — 85025 COMPLETE CBC W/AUTO DIFF WBC: CPT

## 2024-05-11 PROCEDURE — 99285 EMERGENCY DEPT VISIT HI MDM: CPT | Mod: 25

## 2024-05-11 PROCEDURE — 96374 THER/PROPH/DIAG INJ IV PUSH: CPT

## 2024-05-11 PROCEDURE — P9040: CPT

## 2024-05-11 PROCEDURE — 86922 COMPATIBILITY TEST ANTIGLOB: CPT

## 2024-05-11 PROCEDURE — 85027 COMPLETE CBC AUTOMATED: CPT

## 2024-05-11 PROCEDURE — 85045 AUTOMATED RETICULOCYTE COUNT: CPT

## 2024-05-11 RX ORDER — SENNA PLUS 8.6 MG/1
2 TABLET ORAL
Qty: 0 | Refills: 0 | DISCHARGE
Start: 2024-05-11

## 2024-05-11 RX ORDER — SENNA PLUS 8.6 MG/1
2 TABLET ORAL
Qty: 60 | Refills: 0
Start: 2024-05-11 | End: 2024-06-09

## 2024-05-11 RX ORDER — POLYETHYLENE GLYCOL 3350 17 G/17G
17 POWDER, FOR SOLUTION ORAL
Qty: 510 | Refills: 0
Start: 2024-05-11 | End: 2024-06-09

## 2024-05-11 RX ORDER — POLYETHYLENE GLYCOL 3350 17 G/17G
17 POWDER, FOR SOLUTION ORAL
Qty: 0 | Refills: 0 | DISCHARGE
Start: 2024-05-11

## 2024-05-11 RX ADMIN — HYDROMORPHONE HYDROCHLORIDE 2 MILLIGRAM(S): 2 INJECTION INTRAMUSCULAR; INTRAVENOUS; SUBCUTANEOUS at 06:30

## 2024-05-11 RX ADMIN — HYDROMORPHONE HYDROCHLORIDE 2 MILLIGRAM(S): 2 INJECTION INTRAMUSCULAR; INTRAVENOUS; SUBCUTANEOUS at 09:24

## 2024-05-11 RX ADMIN — HYDROMORPHONE HYDROCHLORIDE 2 MILLIGRAM(S): 2 INJECTION INTRAMUSCULAR; INTRAVENOUS; SUBCUTANEOUS at 02:59

## 2024-05-11 RX ADMIN — HYDROMORPHONE HYDROCHLORIDE 2 MILLIGRAM(S): 2 INJECTION INTRAMUSCULAR; INTRAVENOUS; SUBCUTANEOUS at 00:11

## 2024-05-11 RX ADMIN — HYDROMORPHONE HYDROCHLORIDE 2 MILLIGRAM(S): 2 INJECTION INTRAMUSCULAR; INTRAVENOUS; SUBCUTANEOUS at 03:30

## 2024-05-11 RX ADMIN — HYDROMORPHONE HYDROCHLORIDE 2 MILLIGRAM(S): 2 INJECTION INTRAMUSCULAR; INTRAVENOUS; SUBCUTANEOUS at 12:14

## 2024-05-11 RX ADMIN — Medication 200 MILLIGRAM(S): at 12:14

## 2024-05-11 RX ADMIN — PANTOPRAZOLE SODIUM 40 MILLIGRAM(S): 20 TABLET, DELAYED RELEASE ORAL at 05:35

## 2024-05-11 RX ADMIN — HYDROMORPHONE HYDROCHLORIDE 2 MILLIGRAM(S): 2 INJECTION INTRAMUSCULAR; INTRAVENOUS; SUBCUTANEOUS at 06:01

## 2024-05-11 RX ADMIN — HYDROMORPHONE HYDROCHLORIDE 2 MILLIGRAM(S): 2 INJECTION INTRAMUSCULAR; INTRAVENOUS; SUBCUTANEOUS at 12:30

## 2024-05-11 RX ADMIN — HYDROMORPHONE HYDROCHLORIDE 2 MILLIGRAM(S): 2 INJECTION INTRAMUSCULAR; INTRAVENOUS; SUBCUTANEOUS at 00:45

## 2024-05-11 RX ADMIN — Medication 1 MILLIGRAM(S): at 05:35

## 2024-05-11 RX ADMIN — HYDROMORPHONE HYDROCHLORIDE 2 MILLIGRAM(S): 2 INJECTION INTRAMUSCULAR; INTRAVENOUS; SUBCUTANEOUS at 09:05

## 2024-05-11 NOTE — DISCHARGE NOTE NURSING/CASE MANAGEMENT/SOCIAL WORK - PATIENT PORTAL LINK FT
You can access the FollowMyHealth Patient Portal offered by [facility] by registering at the following website: [email or web address]. By joining [facility]’s FollowMyHealth portal, you will also be able to view your health information using other applications (apps) compatible with our system.

## 2024-05-11 NOTE — DISCHARGE NOTE PROVIDER - NSDCMRMEDTOKEN_GEN_ALL_CORE_FT
acetaminophen 325 mg oral tablet: 2 tab(s) orally every 6 hours As needed Temp greater or equal to 38C (100.4F), Mild Pain (1 - 3)  allopurinol 200 mg oral tablet: 1 tab(s) orally once a day  folic acid 1 mg oral tablet: 1 tab(s) orally every 24 hours  omeprazole 20 mg oral delayed release capsule: 1 cap(s) orally once a day  oxyCODONE 30 mg oral tablet: 1 tab(s) orally every 4 hours as needed for  severe pain  polyethylene glycol 3350 oral powder for reconstitution: 17 gram(s) orally once a day  senna leaf extract oral tablet: 2 tab(s) orally once a day (at bedtime)   acetaminophen 325 mg oral tablet: 2 tab(s) orally every 6 hours As needed Temp greater or equal to 38C (100.4F), Mild Pain (1 - 3)  allopurinol 200 mg oral tablet: 1 tab(s) orally once a day  folic acid 1 mg oral tablet: 1 tab(s) orally every 24 hours  omeprazole 20 mg oral delayed release capsule: 1 cap(s) orally once a day  oxyCODONE 30 mg oral tablet: 1 tab(s) orally every 4 hours as needed for  severe pain  polyethylene glycol 3350 oral powder for reconstitution: 17 gram(s) orally once a day  senna leaf extract oral tablet: 2 tab(s) orally once a day (at bedtime) Do not use if you develop diarrhea

## 2024-05-11 NOTE — DISCHARGE NOTE PROVIDER - ATTENDING DISCHARGE PHYSICAL EXAMINATION:
Patient seen and examined. Case discussed with STACY Mills. Patient reports his pain has improved slightly and he would like to be discharged home as he feels the level of pain is manageable with his oxycodone IR 30mg at home. He reports he has oxycodone at home and knows to begin his Oxbryta when he is at home. He is medically stable for dc home.    PHYSICAL EXAM:  GENERAL: NAD, well-developed, walking around room  HEAD:  Atraumatic, Normocephalic  EYES: +scleral icterus  NECK: Supple, No JVD  CHEST/LUNG: Clear to auscultation bilaterally; No wheeze, +R chest wall mediport is c/d/i  HEART: Regular rate and rhythm; No murmurs, rubs, or gallops  ABDOMEN: Soft, Nontender, Nondistended; Bowel sounds present  EXTREMITIES:  2+ Peripheral Pulses, No clubbing, cyanosis, or edema  PSYCH: AAOx3, pleasant, cooperative  NEUROLOGY: non-focal  SKIN: No rashes or lesions, slight jaundice

## 2024-05-11 NOTE — DISCHARGE NOTE PROVIDER - NSDCFUADDINST_GEN_ALL_CORE_FT
Make sure to start taking your new medication Oxbryta as prescribed by your doctor.  Report any new symptoms of rash, gastrointestinal symptoms such as nausea, vomiting, diarrhea or abdominal pain, headache or fever while on this medication.

## 2024-05-11 NOTE — DISCHARGE NOTE PROVIDER - HOSPITAL COURSE
Mr. Downey is a 43 year old male from home, ambulates independently, with PMH of gout and sickle cell diease with chronic anemia and baseline Hb of 6 who presented to the ED generalized pain. He has had more frequent sickle cell crisis from yearly to every 2-3 months. Was transitioned to Oxbryta (received shipment on Tuesday but did not start yet). Sent from Dr. Jordan's office for worsening pain and low Hgb. Denies fevers, chills, bloody BMs    In the ED, patients hemodynamically stable and afebrile.  Patient with leukocytosis to 14K and H/H 5.6/15.5   Patient also with MATA of 1.51 (baseline <1) and elevated liver enzymes which appear stable since prior admissions.     He was admitted for symptomatic anemia and sickle cell crisis.  Received 2 units of PRBC with appropriate response and also with improvement in his pain. Pt was also followed by Hem-onc and pain management.               5/11- Pt evaluated at bedside, alert and oriented w/o acute complaints, reports feeling better.    On exam-   Vital Signs Last 24 Hrs  T(C): 36.6 (05-11-24 @ 05:27), Max: 36.8 (05-10-24 @ 20:54)  T(F): 97.9 (05-11-24 @ 05:27), Max: 98.2 (05-10-24 @ 20:54)  HR: 70 (05-11-24 @ 05:27) (70 - 80)  BP: 149/85 (05-11-24 @ 05:27) (149/85 - 151/81)  BP(mean): --  RR: 18 (05-11-24 @ 05:27) (18 - 18)  SpO2: 93% (05-11-24 @ 05:27) (93% - 97%)    Focus PE:  Constitutional: in bed resting comfortably w/o signs of distress  Neuro- alert and oriented x 4, speaking w/ clear articulate speech, extremities w/ equal strength,    Head: NCAT  Eyes- icterus, PERRLA, MMM  Neck- supple, NT, no cervical LAD  CV-RRR, nml S1S2, no mmr, rubs or gallops  Resp- BL-CTA w/o increased WOB  GI- Abd sft, nt, nd, nr or guarding, no pulsatile mass+ BS  Extremities- all extremities w/ FROM, + distal pulses    Labs: AM CBC                       8.1    10.70 )-----------( 285    ( 11 May 2024 08:15 )             22.7   Case discussed w/ Attending directing pt's care and pt is medically stable for d/c.   D/C instructions discussed with pt at length in explicit details w/ understanding verbalized. Mr. Downey is a 43 year old male from home, ambulates independently, with PMH of gout and sickle cell diease with chronic anemia and baseline Hb of 6 who presented to the ED generalized pain. He has had more frequent sickle cell crisis from yearly to every 2-3 months. Was transitioned to Oxbryta (received shipment on Tuesday but did not start yet). Sent from Dr. Jordan's office for worsening pain and low Hgb. Denies fevers, chills, bloody BMs    In the ED, patients hemodynamically stable and afebrile.  Patient with leukocytosis to 14K and H/H 5.6/15.5   Patient also with MATA of 1.51 (baseline <1) and elevated liver enzymes which appear stable since prior admissions.     He was admitted for symptomatic anemia and sickle cell crisis.  Received 2 units of PRBC with appropriate response and also with improvement in his pain. Pt was also followed by Hem-onc and pain management.               5/11- Pt evaluated at bedside, alert and oriented w/o acute complaints, reports feeling better and wants to go home.  On exam-   Vital Signs Last 24 Hrs  T(C): 36.6 (05-11-24 @ 05:27), Max: 36.8 (05-10-24 @ 20:54)  T(F): 97.9 (05-11-24 @ 05:27), Max: 98.2 (05-10-24 @ 20:54)  HR: 70 (05-11-24 @ 05:27) (70 - 80)  BP: 149/85 (05-11-24 @ 05:27) (149/85 - 151/81)  BP(mean): --  RR: 18 (05-11-24 @ 05:27) (18 - 18)  SpO2: 93% (05-11-24 @ 05:27) (93% - 97%)    Focus PE:  Constitutional: in bed resting comfortably w/o signs of distress  Neuro- alert and oriented x 4, speaking w/ clear articulate speech, extremities w/ equal strength,    Head: NCAT  Eyes- icterus, PERRLA, MMM  Neck- supple, NT, no cervical LAD  CV-RRR, nml S1S2, no mmr, rubs or gallops  Resp- BL-CTA w/o increased WOB  GI- Abd sft, nt, nd, nr or guarding, no pulsatile mass+ BS  Extremities- all extremities w/ FROM, + distal pulses    Labs: AM CBC                       8.1    10.70 )-----------( 285    ( 11 May 2024 08:15 )             22.7   Case discussed w/ Attending directing pt's care and pt is medically stable for d/c.   D/C instructions discussed with pt at length in explicit details w/ understanding verbalized.

## 2024-05-11 NOTE — DISCHARGE NOTE PROVIDER - CARE PROVIDER_API CALL
Julian [facility]  Medical Oncology  [address] [place] [id number]  Phone: [phone]  Fax: [phone]  Follow Up Time:

## 2024-05-11 NOTE — DISCHARGE NOTE PROVIDER - NSDCCPCAREPLAN_GEN_ALL_CORE_FT
PRINCIPAL DISCHARGE DIAGNOSIS  Diagnosis: Symptomatic anemia  Assessment and Plan of Treatment: Symptoms to report, bleeding, palpitations, fatigue, pale skin, cold skin, dizziness. Take medications as ordered by doctor.         SECONDARY DISCHARGE DIAGNOSES  Diagnosis: Anemia, sickle cell with crisis  Assessment and Plan of Treatment: Sickle cell anemia is when red blood cells are abnormal.  The RBCs get stuck together and are unable to bring enough oxygen to your organs  Call your doctor if any signs of infection, swelling, nausea or vomiting  Avoid dehydration by drinking adequate amount of fluids  Avoid stress  Avoid high altitude places and mountains where there is less oxygen  It is important to treat infetions right away even in the middle of the night - call your doctor with any temperature higher than 101.5  Keep your regular check up appointments with your doctor and follow his instructions carefully  Keep up with your vaccinations to prevent certain infections  Take medications as prescribed - you also will be on folic acid      Diagnosis: MATA (acute kidney injury)  Assessment and Plan of Treatment: You kidney function was elevated likely from dehydration.  Keep yourself well hydrated and take your medication as prescribe.  Follow up with your doctor for routine blood work to evaluate your kidney function.        Diagnosis: Elevated liver function tests  Assessment and Plan of Treatment: You were noted to have an elevated liver enzymes but similar to your prior lab result.  Make sure to continue to follow up with your doctor as out pt for ongoing monitoring of  your liver function.  Report any new symptoms of yellowing eyes or your skin, changes in the color of your urine, iching skin or rash, easy bruising.    Diagnosis: Gout  Assessment and Plan of Treatment: Continue to take your medication as prescribed and report any symptoms of worsening pain.

## 2024-05-11 NOTE — DISCHARGE NOTE NURSING/CASE MANAGEMENT/SOCIAL WORK - NSDCPEFALRISK_GEN_ALL_CORE
For information on Fall & Injury Prevention, visit: [email or web address] OR  [email or web address] OR  https://www.cdc.gov/steadi/patient.html

## 2024-07-09 ENCOUNTER — INPATIENT (INPATIENT)
Facility: HOSPITAL | Age: 44
LOS: 4 days | Discharge: HOME | DRG: 812 | End: 2024-07-14
Attending: STUDENT IN AN ORGANIZED HEALTH CARE EDUCATION/TRAINING PROGRAM | Admitting: STUDENT IN AN ORGANIZED HEALTH CARE EDUCATION/TRAINING PROGRAM
Payer: MEDICAID

## 2024-07-09 DIAGNOSIS — Z90.49 ACQUIRED ABSENCE OF OTHER SPECIFIED PARTS OF DIGESTIVE TRACT: Chronic | ICD-10-CM

## 2024-07-09 PROCEDURE — 99285 EMERGENCY DEPT VISIT HI MDM: CPT | Mod: 25

## 2024-07-10 VITALS
SYSTOLIC BLOOD PRESSURE: 151 MMHG | WEIGHT: 207.23 LBS | OXYGEN SATURATION: 90 % | TEMPERATURE: 98 F | HEIGHT: 69 IN | DIASTOLIC BLOOD PRESSURE: 88 MMHG | HEART RATE: 100 BPM | RESPIRATION RATE: 19 BRPM

## 2024-07-10 DIAGNOSIS — D57.01 HB-SS DISEASE WITH ACUTE CHEST SYNDROME: ICD-10-CM

## 2024-07-10 DIAGNOSIS — D64.9 ANEMIA, UNSPECIFIED: ICD-10-CM

## 2024-07-10 DIAGNOSIS — D57.00 HB-SS DISEASE WITH CRISIS, UNSPECIFIED: ICD-10-CM

## 2024-07-10 DIAGNOSIS — Z29.9 ENCOUNTER FOR PROPHYLACTIC MEASURES, UNSPECIFIED: ICD-10-CM

## 2024-07-10 DIAGNOSIS — N17.9 ACUTE KIDNEY FAILURE, UNSPECIFIED: ICD-10-CM

## 2024-07-10 DIAGNOSIS — M10.9 GOUT, UNSPECIFIED: ICD-10-CM

## 2024-07-10 LAB
ALBUMIN SERPL ELPH-MCNC: 3 G/DL — LOW (ref 3.5–5)
ALP SERPL-CCNC: 142 U/L — HIGH (ref 40–120)
ALT FLD-CCNC: 66 U/L DA — HIGH (ref 10–60)
ANION GAP SERPL CALC-SCNC: 5 MMOL/L — SIGNIFICANT CHANGE UP (ref 5–17)
ANION GAP SERPL CALC-SCNC: 7 MMOL/L — SIGNIFICANT CHANGE UP (ref 5–17)
ANISOCYTOSIS BLD QL: SLIGHT — SIGNIFICANT CHANGE UP
APTT BLD: 24.8 SEC — SIGNIFICANT CHANGE UP (ref 24.5–35.6)
AST SERPL-CCNC: 179 U/L — HIGH (ref 10–40)
BASOPHILS # BLD AUTO: 0.11 K/UL — SIGNIFICANT CHANGE UP (ref 0–0.2)
BASOPHILS # BLD AUTO: 0.16 K/UL — SIGNIFICANT CHANGE UP (ref 0–0.2)
BASOPHILS NFR BLD AUTO: 1.3 % — SIGNIFICANT CHANGE UP (ref 0–2)
BASOPHILS NFR BLD AUTO: 2 % — SIGNIFICANT CHANGE UP (ref 0–2)
BILIRUB DIRECT SERPL-MCNC: 5 MG/DL — HIGH (ref 0–0.3)
BILIRUB DIRECT SERPL-MCNC: 6.2 MG/DL — HIGH (ref 0–0.3)
BILIRUB INDIRECT FLD-MCNC: 3.9 MG/DL — HIGH (ref 0.2–1)
BILIRUB INDIRECT FLD-MCNC: 5.1 MG/DL — HIGH (ref 0.2–1)
BILIRUB SERPL-MCNC: 11.3 MG/DL — HIGH (ref 0.2–1.2)
BILIRUB SERPL-MCNC: 8.6 MG/DL — HIGH (ref 0.2–1.2)
BILIRUB SERPL-MCNC: 8.9 MG/DL — HIGH (ref 0.2–1.2)
BLD GP AB SCN SERPL QL: SIGNIFICANT CHANGE UP
BUN SERPL-MCNC: 20 MG/DL — HIGH (ref 7–18)
BUN SERPL-MCNC: 20 MG/DL — HIGH (ref 7–18)
CALCIUM SERPL-MCNC: 8.3 MG/DL — LOW (ref 8.4–10.5)
CALCIUM SERPL-MCNC: 8.7 MG/DL — SIGNIFICANT CHANGE UP (ref 8.4–10.5)
CHLORIDE SERPL-SCNC: 105 MMOL/L — SIGNIFICANT CHANGE UP (ref 96–108)
CHLORIDE SERPL-SCNC: 108 MMOL/L — SIGNIFICANT CHANGE UP (ref 96–108)
CO2 SERPL-SCNC: 22 MMOL/L — SIGNIFICANT CHANGE UP (ref 22–31)
CO2 SERPL-SCNC: 22 MMOL/L — SIGNIFICANT CHANGE UP (ref 22–31)
CREAT SERPL-MCNC: 1.16 MG/DL — SIGNIFICANT CHANGE UP (ref 0.5–1.3)
CREAT SERPL-MCNC: 1.5 MG/DL — HIGH (ref 0.5–1.3)
EGFR: 58 ML/MIN/1.73M2 — LOW
EGFR: 79 ML/MIN/1.73M2 — SIGNIFICANT CHANGE UP
ELLIPTOCYTES BLD QL SMEAR: SLIGHT — SIGNIFICANT CHANGE UP
EOSINOPHIL # BLD AUTO: 0 K/UL — SIGNIFICANT CHANGE UP (ref 0–0.5)
EOSINOPHIL # BLD AUTO: 0.18 K/UL — SIGNIFICANT CHANGE UP (ref 0–0.5)
EOSINOPHIL NFR BLD AUTO: 0 % — SIGNIFICANT CHANGE UP (ref 0–6)
EOSINOPHIL NFR BLD AUTO: 2.2 % — SIGNIFICANT CHANGE UP (ref 0–6)
GLUCOSE SERPL-MCNC: 103 MG/DL — HIGH (ref 70–99)
GLUCOSE SERPL-MCNC: 139 MG/DL — HIGH (ref 70–99)
HCT VFR BLD CALC: 16 % — CRITICAL LOW (ref 39–50)
HCT VFR BLD CALC: 20.3 % — CRITICAL LOW (ref 39–50)
HCT VFR BLD CALC: 20.4 % — CRITICAL LOW (ref 39–50)
HGB BLD-MCNC: 5.9 G/DL — CRITICAL LOW (ref 13–17)
HGB BLD-MCNC: 7.2 G/DL — LOW (ref 13–17)
HGB BLD-MCNC: 7.5 G/DL — LOW (ref 13–17)
IMM GRANULOCYTES NFR BLD AUTO: 0.7 % — SIGNIFICANT CHANGE UP (ref 0–0.9)
INR BLD: 1.12 RATIO — SIGNIFICANT CHANGE UP (ref 0.85–1.18)
LDH SERPL L TO P-CCNC: 1193 U/L — HIGH (ref 120–225)
LG PLATELETS BLD QL AUTO: SLIGHT — SIGNIFICANT CHANGE UP
LYMPHOCYTES # BLD AUTO: 2.55 K/UL — SIGNIFICANT CHANGE UP (ref 1–3.3)
LYMPHOCYTES # BLD AUTO: 3.15 K/UL — SIGNIFICANT CHANGE UP (ref 1–3.3)
LYMPHOCYTES # BLD AUTO: 32 % — SIGNIFICANT CHANGE UP (ref 13–44)
LYMPHOCYTES # BLD AUTO: 38.1 % — SIGNIFICANT CHANGE UP (ref 13–44)
MACROCYTES BLD QL: SLIGHT — SIGNIFICANT CHANGE UP
MANUAL SMEAR VERIFICATION: SIGNIFICANT CHANGE UP
MCHC RBC-ENTMCNC: 30.5 PG — SIGNIFICANT CHANGE UP (ref 27–34)
MCHC RBC-ENTMCNC: 31.1 PG — SIGNIFICANT CHANGE UP (ref 27–34)
MCHC RBC-ENTMCNC: 32.1 PG — SIGNIFICANT CHANGE UP (ref 27–34)
MCHC RBC-ENTMCNC: 35.5 GM/DL — SIGNIFICANT CHANGE UP (ref 32–36)
MCHC RBC-ENTMCNC: 36.8 GM/DL — HIGH (ref 32–36)
MCHC RBC-ENTMCNC: 36.9 GM/DL — HIGH (ref 32–36)
MCV RBC AUTO: 84.6 FL — SIGNIFICANT CHANGE UP (ref 80–100)
MCV RBC AUTO: 86 FL — SIGNIFICANT CHANGE UP (ref 80–100)
MCV RBC AUTO: 87 FL — SIGNIFICANT CHANGE UP (ref 80–100)
MICROCYTES BLD QL: SLIGHT — SIGNIFICANT CHANGE UP
MONOCYTES # BLD AUTO: 0.64 K/UL — SIGNIFICANT CHANGE UP (ref 0–0.9)
MONOCYTES # BLD AUTO: 1.08 K/UL — HIGH (ref 0–0.9)
MONOCYTES NFR BLD AUTO: 13.1 % — SIGNIFICANT CHANGE UP (ref 2–14)
MONOCYTES NFR BLD AUTO: 8 % — SIGNIFICANT CHANGE UP (ref 2–14)
NEUTROPHILS # BLD AUTO: 3.69 K/UL — SIGNIFICANT CHANGE UP (ref 1.8–7.4)
NEUTROPHILS # BLD AUTO: 4.62 K/UL — SIGNIFICANT CHANGE UP (ref 1.8–7.4)
NEUTROPHILS NFR BLD AUTO: 44.6 % — SIGNIFICANT CHANGE UP (ref 43–77)
NEUTROPHILS NFR BLD AUTO: 58 % — SIGNIFICANT CHANGE UP (ref 43–77)
NRBC # BLD: 0 /100 WBCS — SIGNIFICANT CHANGE UP (ref 0–0)
NT-PROBNP SERPL-SCNC: 989 PG/ML — HIGH (ref 0–125)
OVALOCYTES BLD QL SMEAR: SLIGHT — SIGNIFICANT CHANGE UP
PLAT MORPH BLD: NORMAL — SIGNIFICANT CHANGE UP
PLATELET # BLD AUTO: 237 K/UL — SIGNIFICANT CHANGE UP (ref 150–400)
PLATELET # BLD AUTO: 237 K/UL — SIGNIFICANT CHANGE UP (ref 150–400)
PLATELET # BLD AUTO: 247 K/UL — SIGNIFICANT CHANGE UP (ref 150–400)
PLATELET COUNT - ESTIMATE: NORMAL — SIGNIFICANT CHANGE UP
POIKILOCYTOSIS BLD QL AUTO: SIGNIFICANT CHANGE UP
POLYCHROMASIA BLD QL SMEAR: SLIGHT — SIGNIFICANT CHANGE UP
POTASSIUM SERPL-MCNC: 4.2 MMOL/L — SIGNIFICANT CHANGE UP (ref 3.5–5.3)
POTASSIUM SERPL-MCNC: 4.4 MMOL/L — SIGNIFICANT CHANGE UP (ref 3.5–5.3)
POTASSIUM SERPL-SCNC: 4.2 MMOL/L — SIGNIFICANT CHANGE UP (ref 3.5–5.3)
POTASSIUM SERPL-SCNC: 4.4 MMOL/L — SIGNIFICANT CHANGE UP (ref 3.5–5.3)
PROT SERPL-MCNC: 7.3 G/DL — SIGNIFICANT CHANGE UP (ref 6–8.3)
PROTHROM AB SERPL-ACNC: 12.7 SEC — SIGNIFICANT CHANGE UP (ref 9.5–13)
RBC # BLD: 1.84 M/UL — LOW (ref 4.2–5.8)
RBC # BLD: 1.84 M/UL — LOW (ref 4.2–5.8)
RBC # BLD: 2.36 M/UL — LOW (ref 4.2–5.8)
RBC # BLD: 2.41 M/UL — LOW (ref 4.2–5.8)
RBC # FLD: 22.9 % — HIGH (ref 10.3–14.5)
RBC # FLD: 23.2 % — HIGH (ref 10.3–14.5)
RBC # FLD: 25.8 % — HIGH (ref 10.3–14.5)
RBC BLD AUTO: ABNORMAL
RETICS #: 310 K/UL — HIGH (ref 25–125)
RETICS/RBC NFR: 17 % — HIGH (ref 0.5–2.5)
SCHISTOCYTES BLD QL AUTO: SIGNIFICANT CHANGE UP
SICKLE CELLS BLD QL SMEAR: SIGNIFICANT CHANGE UP
SODIUM SERPL-SCNC: 134 MMOL/L — LOW (ref 135–145)
SODIUM SERPL-SCNC: 135 MMOL/L — SIGNIFICANT CHANGE UP (ref 135–145)
STOMATOCYTES BLD QL SMEAR: SLIGHT — SIGNIFICANT CHANGE UP
TROPONIN I, HIGH SENSITIVITY RESULT: 35.6 NG/L — SIGNIFICANT CHANGE UP
TROPONIN I, HIGH SENSITIVITY RESULT: 39.4 NG/L — SIGNIFICANT CHANGE UP
WBC # BLD: 7.96 K/UL — SIGNIFICANT CHANGE UP (ref 3.8–10.5)
WBC # BLD: 8.27 K/UL — SIGNIFICANT CHANGE UP (ref 3.8–10.5)
WBC # BLD: 8.86 K/UL — SIGNIFICANT CHANGE UP (ref 3.8–10.5)
WBC # FLD AUTO: 7.96 K/UL — SIGNIFICANT CHANGE UP (ref 3.8–10.5)
WBC # FLD AUTO: 8.27 K/UL — SIGNIFICANT CHANGE UP (ref 3.8–10.5)
WBC # FLD AUTO: 8.86 K/UL — SIGNIFICANT CHANGE UP (ref 3.8–10.5)

## 2024-07-10 PROCEDURE — 99222 1ST HOSP IP/OBS MODERATE 55: CPT | Mod: GC

## 2024-07-10 PROCEDURE — 71046 X-RAY EXAM CHEST 2 VIEWS: CPT | Mod: 26

## 2024-07-10 PROCEDURE — 99222 1ST HOSP IP/OBS MODERATE 55: CPT

## 2024-07-10 PROCEDURE — 72170 X-RAY EXAM OF PELVIS: CPT | Mod: 26

## 2024-07-10 RX ORDER — HYDROMORPHONE HCL 0.2 MG/ML
0.5 INJECTION, SOLUTION INTRAVENOUS ONCE
Refills: 0 | Status: DISCONTINUED | OUTPATIENT
Start: 2024-07-10 | End: 2024-07-10

## 2024-07-10 RX ORDER — ACETAMINOPHEN 325 MG
650 TABLET ORAL ONCE
Refills: 0 | Status: COMPLETED | OUTPATIENT
Start: 2024-07-10 | End: 2024-07-10

## 2024-07-10 RX ORDER — HEPARIN SODIUM 50 [USP'U]/ML
5000 INJECTION, SOLUTION INTRAVENOUS EVERY 8 HOURS
Refills: 0 | Status: DISCONTINUED | OUTPATIENT
Start: 2024-07-10 | End: 2024-07-14

## 2024-07-10 RX ORDER — HYDROMORPHONE HCL 0.2 MG/ML
1.5 INJECTION, SOLUTION INTRAVENOUS EVERY 4 HOURS
Refills: 0 | Status: DISCONTINUED | OUTPATIENT
Start: 2024-07-10 | End: 2024-07-10

## 2024-07-10 RX ORDER — HYDROMORPHONE HCL 0.2 MG/ML
2 INJECTION, SOLUTION INTRAVENOUS
Refills: 0 | Status: DISCONTINUED | OUTPATIENT
Start: 2024-07-10 | End: 2024-07-14

## 2024-07-10 RX ORDER — DIPHENHYDRAMINE HCL 12.5MG/5ML
50 ELIXIR ORAL ONCE
Refills: 0 | Status: COMPLETED | OUTPATIENT
Start: 2024-07-10 | End: 2024-07-10

## 2024-07-10 RX ORDER — ACETAMINOPHEN 325 MG
650 TABLET ORAL EVERY 6 HOURS
Refills: 0 | Status: DISCONTINUED | OUTPATIENT
Start: 2024-07-10 | End: 2024-07-14

## 2024-07-10 RX ORDER — HYDROMORPHONE HCL 0.2 MG/ML
1 INJECTION, SOLUTION INTRAVENOUS ONCE
Refills: 0 | Status: DISCONTINUED | OUTPATIENT
Start: 2024-07-10 | End: 2024-07-10

## 2024-07-10 RX ORDER — DEXTROSE MONOHYDRATE AND SODIUM CHLORIDE 5; .3 G/100ML; G/100ML
1000 INJECTION, SOLUTION INTRAVENOUS
Refills: 0 | Status: DISCONTINUED | OUTPATIENT
Start: 2024-07-10 | End: 2024-07-14

## 2024-07-10 RX ORDER — DIPHENHYDRAMINE HCL 12.5MG/5ML
50 ELIXIR ORAL ONCE
Refills: 0 | Status: DISCONTINUED | OUTPATIENT
Start: 2024-07-10 | End: 2024-07-10

## 2024-07-10 RX ADMIN — HYDROMORPHONE HCL 0.5 MILLIGRAM(S): 0.2 INJECTION, SOLUTION INTRAVENOUS at 11:21

## 2024-07-10 RX ADMIN — HYDROMORPHONE HCL 2 MILLIGRAM(S): 0.2 INJECTION, SOLUTION INTRAVENOUS at 16:32

## 2024-07-10 RX ADMIN — HEPARIN SODIUM 5000 UNIT(S): 50 INJECTION, SOLUTION INTRAVENOUS at 13:14

## 2024-07-10 RX ADMIN — HYDROMORPHONE HCL 2 MILLIGRAM(S): 0.2 INJECTION, SOLUTION INTRAVENOUS at 13:14

## 2024-07-10 RX ADMIN — HYDROMORPHONE HCL 1 MILLIGRAM(S): 0.2 INJECTION, SOLUTION INTRAVENOUS at 04:13

## 2024-07-10 RX ADMIN — HYDROMORPHONE HCL 1 MILLIGRAM(S): 0.2 INJECTION, SOLUTION INTRAVENOUS at 05:44

## 2024-07-10 RX ADMIN — HYDROMORPHONE HCL 2 MILLIGRAM(S): 0.2 INJECTION, SOLUTION INTRAVENOUS at 19:49

## 2024-07-10 RX ADMIN — HYDROMORPHONE HCL 2 MILLIGRAM(S): 0.2 INJECTION, SOLUTION INTRAVENOUS at 23:40

## 2024-07-10 RX ADMIN — HYDROMORPHONE HCL 1.5 MILLIGRAM(S): 0.2 INJECTION, SOLUTION INTRAVENOUS at 11:01

## 2024-07-10 RX ADMIN — HYDROMORPHONE HCL 2 MILLIGRAM(S): 0.2 INJECTION, SOLUTION INTRAVENOUS at 23:05

## 2024-07-10 RX ADMIN — DEXTROSE MONOHYDRATE AND SODIUM CHLORIDE 75 MILLILITER(S): 5; .3 INJECTION, SOLUTION INTRAVENOUS at 13:14

## 2024-07-10 RX ADMIN — HYDROMORPHONE HCL 1.5 MILLIGRAM(S): 0.2 INJECTION, SOLUTION INTRAVENOUS at 10:01

## 2024-07-10 RX ADMIN — HYDROMORPHONE HCL 2 MILLIGRAM(S): 0.2 INJECTION, SOLUTION INTRAVENOUS at 14:14

## 2024-07-10 RX ADMIN — HYDROMORPHONE HCL 2 MILLIGRAM(S): 0.2 INJECTION, SOLUTION INTRAVENOUS at 19:45

## 2024-07-10 RX ADMIN — Medication 50 MILLIGRAM(S): at 04:28

## 2024-07-10 RX ADMIN — HYDROMORPHONE HCL 1 MILLIGRAM(S): 0.2 INJECTION, SOLUTION INTRAVENOUS at 05:59

## 2024-07-10 RX ADMIN — HYDROMORPHONE HCL 1 MILLIGRAM(S): 0.2 INJECTION, SOLUTION INTRAVENOUS at 01:18

## 2024-07-10 RX ADMIN — HYDROMORPHONE HCL 2 MILLIGRAM(S): 0.2 INJECTION, SOLUTION INTRAVENOUS at 17:32

## 2024-07-10 RX ADMIN — Medication 650 MILLIGRAM(S): at 05:56

## 2024-07-10 RX ADMIN — HYDROMORPHONE HCL 0.5 MILLIGRAM(S): 0.2 INJECTION, SOLUTION INTRAVENOUS at 12:21

## 2024-07-10 RX ADMIN — Medication 650 MILLIGRAM(S): at 04:13

## 2024-07-10 RX ADMIN — HYDROMORPHONE HCL 1 MILLIGRAM(S): 0.2 INJECTION, SOLUTION INTRAVENOUS at 05:43

## 2024-07-10 NOTE — CHART NOTE - NSCHARTNOTEFT_GEN_A_CORE
Patient had a chemo like port placed in 2016 in [facility] due to poor access.  He reports he gets his medication and blood transfusion through this port. I e The Patient has  a chemo like port placed in his upper right chest in 2016 in [facility].  He reports he gets his medication and blood transfusion through this port due to having bad veins. He refuses to get another line as " he has been poked many times and had many different types of lines including a central line." Risk and benefits were explained of using the port as his sole line was explained to him. He understood and reported he would like to keep using this line for now.

## 2024-07-10 NOTE — ED PROVIDER NOTE - OBJECTIVE STATEMENT
44 year old male from home, ambulates independently, with PMH of gout and sickle cell diease with chronic anemia and baseline Hb of 6 who presenting to the ED with generalized pain and exertional shortness of breath. Patient is endorsing generalized body pains onset 5 to 6 PM today,  which she states is typical of sickle cell crisis episode.  Patient does endorse right hip pain.  He also endorses shortness of breath with minimal exertion, such as taking a couple of steps for the past 2 days, as well as intermittent midsternal chest pain.  Patient does endorse history of acute chest syndrome in the past, and states that current chest pain is less severe than prior episode of ACS.   Patient took oxycodone 30 mg today 6 PM, still had pain so came to the ER.  Of note, patient has had reaction to blood transfusion in the past and requires premedication with Tylenol and Benadryl.   Patient endorses chronic icterus that has been worse over the past couple of days, as well as having dark urine.  No fever no chills.    PMD: Dr Julian Templetonh

## 2024-07-10 NOTE — H&P ADULT - ATTENDING COMMENTS
Patient seen/evaluated at bedside in the ED on 7/10/2024. I agree with the resident  H&P note/outlined plan of care. My independent findings and conclusions are documented.    43 y/o m w/ pmhx of sickle cell disease p/w chest, hip, low back, leg pain consistent with prior episodes of  + mild sob/fitch- though this is improved now  He denies fevers/chills, nausea/vomiting, diarrhea, dysuria, abd pain, cough sputum production. Baseline hgb = 6    vitals reviewed    Physical exam significant for scleral icterus, jaundiced male AAOx3 in NAD, lungs are clear to auscultation and  cardiac exam revealed a tachycardic rate. has a right chest wall port    labs reviewed    EKG NSR at 95BPM with PAC no ROHINI/STD  telemetry- sinus tachycardia    43 y/o m p/w diffuse body aches/pains consistent with prior episodes of vaso-occlusive crisis. Need to assess for infectious process as possible trigger. Currently w/out evidence of acute chest syndrome but would maintain an index of suspicion.    #vaso-occlusive crisis/sickle cell  #symptomatic anemia  #MATA  #acute pain syndrome  #hyperbilirubinemia in setting hemolytic anemia  #suspected iron overload- based on prior hepatic imaging    serial cbc, check repeat BMP if creatinine still elevating obtain kidney bladder ultrasound  limited IVF hydration given propensity for easy volume overload/ avoid dehydration as well  hematology consult- Dr. Delta Yuan  can discontinue telemetry for now given tachycardia and chest discomfort are likely related to the   check TTE  follow up formal cxr read  RUQ ultrasound  trend hemolysis labs inluding LDH, haptoglobin, reticulocyte counts  pt declining to allow peripheral access at this time- only wants port utilized

## 2024-07-10 NOTE — H&P ADULT - PROBLEM SELECTOR PLAN 1
p/w generalized body pain and weakness, chest pain, exertional shortness of breath, sclera icterus, jaundice, dark urine.  Hx of sickle cell crises with last admission in May, 2024  Takes oxycodone 30mg 4 times a day , oxybytra at home  Saturating 96 on 2L of oxygen   EKG shows NSR  Trops neg .    Chest xray shows: Pulmonary vascular congestion. No airspace consolidation or effusion.  s/p 4 1mg Dilaudid pushes   s/p 1 unit of blood   s/p 2 pushes of  diphenhydramine 50 mg   Shirley Jordan is primary oncologist   Admit to tele   c/w Dilaudid 1.5 mg q6    c/w oxygen as needed   Pain management consulted p/w generalized body pain and weakness, chest pain, exertional shortness of breath, sclera icterus, jaundice, dark urine.  Hx of sickle cell crises with last admission in May, 2024  Takes oxycodone 30mg 4 times a day , oxybytra at home  Saturating 96 on 2L of oxygen   EKG shows NSR  Trops neg .    Chest xray shows: Pulmonary vascular congestion. No airspace consolidation or effusion.  s/p 4 1mg Dilaudid pushes   s/p 1 unit of blood   s/p 2 pushes of  diphenhydramine 50 mg   Shirley Jordan is primary hemologist   Admit to tele   c/w Dilaudid 2mg q3    c/w oxygen as needed   Pain management consulted p/w generalized body pain and weakness, chest pain, exertional shortness of breath, sclera icterus, jaundice, dark urine.  Hx of sickle cell crises with last admission in May, 2024  Takes oxycodone 30mg 4 times a day , oxybytra at home  Saturating 96 on 2L of oxygen   EKG shows NSR  Trops neg .    Chest xray shows: Pulmonary vascular congestion. No airspace consolidation or effusion.  s/p 4 1mg Dilaudid pushes   s/p 1 unit of blood   s/p 2 pushes of  diphenhydramine 50 mg   Shirley Jordan is primary hemologist   Admit to tele   c/w Dilaudid 2mg q3    c/w oxygen as needed   Consider Ct agio for high risk PE  Pain management consulted:  dilaudid 2mg IVP q3 hours PRN severe pain. Monitor for sedation/ respiratory depression.  Dr. Yuan / Dr. Jordan following from [facility]

## 2024-07-10 NOTE — H&P ADULT - PROBLEM SELECTOR PLAN 3
see above Likely from sickle cell  cr 1.50   Gentle hydration  Pain control  Trend BMP  avoid nephrotoxic agents

## 2024-07-10 NOTE — ED PROVIDER NOTE - HOW PATIENT ADDRESSED, PROFILE
Quality 431: Preventive Care And Screening: Unhealthy Alcohol Use - Screening: Patient screened for unhealthy alcohol use using a single question and scores less than 2 times per year
Detail Level: Detailed
Quality 226: Preventive Care And Screening: Tobacco Use: Screening And Cessation Intervention: Patient screened for tobacco use and is an ex/non-smoker
TOLU

## 2024-07-10 NOTE — H&P ADULT - ASSESSMENT
4 year old male from home, ambulates independently, with PMH of gout, antritis and sickle cell diease with chronic anemia and baseline Hb of 6 who c/o generalized body pain and weakness, chest pain and exertional shortness of breath. Started 2 days ago and was reports he was here last month for the same condition. Trops neg . EKG NSR. Chest xray shows: Pulmonary vascular congestion. No airspace consolidation or effusion. hemoglobin 5.9. Direct bilirubin 5.0, Indirect bilirubin is 3.9. LDh is 1257. Patient is being admitted for acute chest pain and symptomatica anemia 2/2 to sickle cell crises.      4 year old male from home, ambulates independently, with PMH of gout, antritis and sickle cell diease with chronic anemia and baseline Hb of 6 who c/o generalized body pain and weakness, chest pain and exertional shortness of breath. Started 2 days ago and was reports he was here last month for the same condition. Trops neg . EKG NSR. Chest xray shows: Pulmonary vascular congestion. No airspace consolidation or effusion. hemoglobin 5.9. Direct bilirubin 5.0, Indirect bilirubin is 3.9. LDh is 1257. Patient is being admitted for symptomatica anemia 2/2 to sickle cell crises.

## 2024-07-10 NOTE — H&P ADULT - HISTORY OF PRESENT ILLNESS
44 year old male from home, ambulates independently, with PMH of gout, antritis and sickle cell diease with chronic anemia and baseline Hb of 6 who c/o generalized body pain and weakness, chest pain and exertional shortness of breath. Started 2 days ago and was reports he was here last month for the same condition. Admits to lower back pain, rib pain, right hip pain that is 9/10.  He also endorses shortness of breath with minimal exertion, such as taking a couple of steps for the past 2 days. The chest pain is intermittent and midsternal and has resolved for now.  Admit to worsening chronic icterus and darking urine, some loose stools, HA, and lightheadedness. Denies any fever, chills, abdominal pain, or dysuria.       Of note, patient has had reaction to blood transfusion in the past and requires premedication with Tylenol and Benadryl.   He was here in May for similar pain with symptomatic anemia.

## 2024-07-10 NOTE — ED PROVIDER NOTE - PROGRESS NOTE DETAILS
Tj SOLITARIO: Patient reassessed at bedside, still hypoxic to 90s on room air.  Patient placed on 2 L nasal cannula satting 100%.  Will treat empirically for acute chest syndrome with  antibiotics.     Suspect hypoxia most likely secondary to anemia, patient currently receiving PRBCs.  Consent was placed in chart.  Patient was premedicated prior to transfusion. Will obtain CTA to rule out other causes of hypoxia such as pulmonary embolism. Tj SOLITARIO: Patient reassessed at bedside, still hypoxic to 90s on room air.  Patient placed on 2 L nasal cannula satting 100%.  Will treat empirically for acute chest syndrome with  antibiotics.     Suspect hypoxia most likely secondary to anemia, patient currently receiving PRBCs.  Consent was placed in chart.  Patient was premedicated prior to transfusion. cannot order CTA as patient with chest port and difficult iv access, will admit and obtain VQ to eval for PE Tj SOLITARIO: reassessed at bedside, sating well on 2L NC, discussed plan all questions answered

## 2024-07-10 NOTE — ED PROVIDER NOTE - PHYSICAL EXAMINATION
Gen: NAD; well appearing  Head: NCAT  Eyes: icteric  Resp: sating 94 on NC, CTAB, no W/R/R  CV: RRR, +S1/S2, no M/R/G  GI: Abdomen soft non-distended, NTTP, no masses  MSK: No open wounds, no bruising, no lower extremity edema  Neuro: A&Ox4, sensation nl, MAEx4, follows commands  Ext: no edema, no deformity, warm and well-perfused  Skin: no rash or bruising

## 2024-07-10 NOTE — H&P ADULT - NSHPREVIEWOFSYSTEMS_GEN_ALL_CORE
REVIEW OF SYSTEMS:    CONSTITUTIONAL: No fever, chills, + Generalized  weakness.   EYES/ENT: No visual changes;  No ear pain, runny nose, or sore throat.   NECK: No pain or stiffness.  RESPIRATORY: No cough, wheezing, hemoptysis; + shortness of breath non exertion.  CARDIOVASCULAR: + chest pain and dyspnea on exertion, -  palpitations.  GASTROINTESTINAL: No abdominal or epigastric pain. No nausea, vomiting, or hematemesis; No diarrhea or constipation. No melena or hematochezia.  GENITOURINARY: No dysuria, frequency or hematuria.  MSK: lower back pain and rib pain  NEUROLOGICAL: No numbness or weakness.  SKIN: No itching, rashes.

## 2024-07-10 NOTE — H&P ADULT - NSHPPHYSICALEXAM_GEN_ALL_CORE
T(C): 36.4 (07-10-24 @ 08:24), Max: 36.8 (07-10-24 @ 00:05)  HR: 71 (07-10-24 @ 08:24) (71 - 100)  BP: 147/99 (07-10-24 @ 08:24) (144/88 - 151/88)  RR: 16 (07-10-24 @ 08:24) (16 - 19)  SpO2: 99% (07-10-24 @ 08:24) (90% - 100%)    CONSTITUTIONAL: Well groomed, Agitated   EYES: PERRLA and symmetric, EOMI, + scleral injection  ENMT: Oral mucosa with moist membranes. + Jaundice under the tongue.   RESP: No respiratory distress, no use of accessory muscles; CTA b/l, no WRR  CV: RRR, +S1S2, no MRG; no JVD; no peripheral edema  GI: Soft, NT, ND, no rebound, no guarding; no palpable masses; no hepatosplenomegaly; no hernia palpated  MSK: Tenderness to the lower back pain, and ribs.   Skin: Juandice   PSYCH: Appropriate insight/judgment; A+O x 3, mood and affect appropriate, recent/remote memory intact

## 2024-07-10 NOTE — H&P ADULT - PROBLEM SELECTOR PLAN 6
PT IRP Treatment    Primary Rehabilitation Diagnosis: CVA  Expected Discharge Date: 05/25/18 (AM; no reconference needed)  Planned Discharge Destination: Home    SUBJECTIVE: Subjective: Pt agreeable to session (05/24/18 1400)  Subjective/Objective Comments: pt seen in the gym for session. pt in chair at end of session and waiting in gym for session with SLP (05/24/18 1400)    OBJECTIVE:  Precautions  Other Precautions: fall risk, right side weakness  (05/12/18 0700)    See below for current functional status overview.  See PT flowsheet for full details regarding the PT therapy provided.    ASSESSMENT:   Treatment today am and pm  focused on gait on varying terrain, standing balance/ coordination, facilitation of right LE coordination, transfers, stairs, LE exercises/ integration and HEP.  Patient is demonstrating fair progress supported by mobility indoors and outdoors on varying terrain.    Patient limited at this time by right LE weakness and limited step length in swing, limited coordination/ balance, limited safety awareness during turns.  Patient will benefit from further skilled  to help the patient meet goal of modified independent and return home with support from daughter.    PT Identified Barriers to Discharge: medical needs, impaired balance, lives alone     EDUCATION:   On this date, education was provided to patient and daughter regarding  diagnosis considerations pertaining to rehab, written home exercise program, transfers, ambulation, stairs, community reentry and fall prevention  The response to education was/were: Verbalizes understanding, Demonstrates understanding and Needs reinforcement    PLAN:   Continue skilled PT, including the following Treatment/Interventions: Functional transfer training;Strengthening;Endurance training;Gait training;Stairs retraining;Safety Education (05/22/18 1400)   PT Frequency: 7 days/week (05/22/18 1400), Frequency Comments: 60 minutes/day at least 5x/week (05/22/18  1400)    Treatment Plan for Next Session: RLE motor recruitment and activities to promote right heel strike at initial contact; gait over treadmill at 1.0 mph with BUE propping, reciprocal stair training  Additional Plan Considerations: re-assess trujillo prior to discharge       RECOMMENDATIONS FOR DISCHARGE:  Recommendation for Discharge: PT: Home, OP therapy (daughter will be staying for about 2 months)    PT/OT Mobility Equipment for Discharge: No needs anticipated (05/24/18 1400)  PT/OT ADL Equipment for Discharge: none anticipated, continue to assess (05/24/18 0700)      FUNCTIONAL DATA OVERVIEW LAST 24 HOURS  Bed Mobility        Transfers  Transfers  Sit to Stand: Modified Independent (05/24/18 1400)  Stand to Sit: Modified Independent (05/24/18 1400)  Stand Pivot Transfers: Modified Independent (05/24/18 1400)  Toilet Transfers: Supervision (Supv) (05/24/18 1400)  Car Transfers: Supervision (Supv) (05/24/18 1100)  Assistive Device/: 1 Person;Gait Belt (05/24/18 1100)  Transfer Comments 1: Various seats with cues to reduce pace and to ensure safety. Needs cues for UE placement and eccentric control (05/24/18 1100)  Transfer Comments 2: safety considerations with no device (05/24/18 1400)    Gait  Gait  Gait Assistance: Supervision (Supv) (05/24/18 1400)  Assistive Device/: 1 Person;Gait Belt (05/24/18 1400)  Ambulation Distance (Feet): 370 Feet (05/24/18 1400)  Walk 10 feet: Modified Independent (05/24/18 1100)  Walk 10 feet on uneven or sloping surfaces: Touching/Steadying Assistance (05/24/18 1100)  Pattern: Decreased stance time R;Flexed knee R (05/24/18 1100)  Ambulation Surface: Tile;[place];Uneven;Incline/decline (05/24/18 1100)  Gait Comments 1: In and out door ambulation in familiar and unfamiliar areas with cues to reduce pace and to increase right side awareness. pt able to cross less busy road with light min assist and cues for envirnomental awareness. Going through revolving  door with close supervision. Daughter able to assist with part of gait and PT present. (05/24/18 1100)  Gait Comments 2: Pt needs cues to increase right step length and to minimize knee flexion. See prior comments (05/24/18 1400),      Stairs  Stairs Mobility  Number of Stairs: 24 (05/24/18 1100)  Stair Management Assistance: Touching/Steadying Assistance (05/24/18 1100)  Stair Management Technique: One rail L;Alternating pattern;Step to pattern;Forwards (05/24/18 1100)  Curbs: Touching/Steadying Assistance (05/24/18 1100)  Stairs Mobility Comments: Emphasis on pace and RLE step length to avoid getting caught on step. Pt and daughter instructed to use step-to-step pattern to ensure safety. (05/24/18 1100)    Wheelchair Mobility       Balance  Balance  Sitting - Dynamic: Modified Independent (05/24/18 1400)  Standing - Dynamic (eyes open): Minimal Assist (Min) (05/24/18 1400)  Balance Comments #1: repeated sit/ stand with no use of hands , cues for eccentric control  (05/24/18 1400)    Neuromuscular Re-education      DVT: SCD DVT: subq heparin

## 2024-07-10 NOTE — ED ADULT TRIAGE NOTE - CHIEF COMPLAINT QUOTE
Pt reports that  he has  " CHEST PAIN , BACK PAIN  ,LEG PAIN ,SOB " from  1700pm  yesterday  H/O SICKLE   CELL  disease . last  oxycodone intake  1700 pm yesterday. RIGHT CHEST MED PORT

## 2024-07-10 NOTE — ED ADULT NURSE NOTE - NSFALLUNIVINTERV_ED_ALL_ED
Bed/Stretcher in lowest position, wheels locked, appropriate side rails in place/Call bell, personal items and telephone in reach/Instruct patient to call for assistance before getting out of bed/chair/stretcher/Non-slip footwear applied when patient is off stretcher/[place] to call system/Physically safe environment - no spills, clutter or unnecessary equipment/Purposeful proactive rounding/Room/bathroom lighting operational, light cord in reach

## 2024-07-10 NOTE — ED PROVIDER NOTE - CLINICAL SUMMARY MEDICAL DECISION MAKING FREE TEXT BOX
44 year old male from home, ambulates independently, with PMH of gout and sickle cell diease with chronic anemia and baseline Hb of 6 who presenting to the ED with generalized pain and exertional shortness of breath. 90% on room air improved to 94 nasal cannula.  Icteric eyes, clear to auscultation bilaterally.  Will obtain chest x-ray and pelvic x-ray given reports of chest pain and right hip pain.  Will obtain sickle cell crisis labs,   cardiac workup, provide analgesia and reassess.

## 2024-07-10 NOTE — H&P ADULT - PROBLEM SELECTOR PLAN 4
Likely from sickle cell  cr 1.50   Gentle hydration  Pain control  Trend BMP Likely from sickle cell  cr 1.50   Gentle hydration  Pain control  Trend BMP  avoid nephrotoxic Hx of gout  c/w home medication of allopurinol   F/u Uric acid levels

## 2024-07-10 NOTE — CONSULT NOTE ADULT - PROBLEM SELECTOR RECOMMENDATION 9
Pt with acute lumbar spine and bilateral rib and lower extremity pain which is somatic and neuropathic in nature due to sickle crisis and acute chest syndrome. Retic 17. H/H (5.9/16) on admission, status post 1 unit PRBCs. Patient on oxycodone 30mg PO q 4hrs PRN as outpatient, verified on ISTOP  Opioid pain recommendations   - Administer Dilaudid 0.5mg IVP STAT once now.  - Start dilaudid 2mg IVP q3 hours PRN severe pain. Monitor for sedation/ respiratory depression.   Non-opioid pain recommendations   - Avoid NSAIDs   - Avoid Acetaminophen due to transaminitis  - Patient refused Lidocaine patches   Bowel Regimen  - Continue Miralax 17G PO daily  - Continue Senna 2 tablets at bedtime for constipation  Mild pain (score 1-3)  - Non-pharmacological pain treatment recommendations  - Warm/ Cool packs PRN   - Repositioning, imagery, relaxation, distraction.  - Physical therapy OOB if no contraindications   Recommendations discussed with primary team and RN.

## 2024-07-10 NOTE — H&P ADULT - PROBLEM SELECTOR PLAN 2
p/w chest pain and SOB on exertion   hemoglobin 5.6 , HCT 16 on admission  s/p one unit  baseline is 6  Repeat hemoglobin is 7.2, HCT 20   monitor CBC every 6 hours   rest as above p/w chest pain and SOB on exertion   hemoglobin 5.6 , HCT 16 on admission  s/p one unit  baseline is 6  Repeat hemoglobin is 7.2, HCT 20   monitor CBC every 6 hours   rest as above  incentive spirometers

## 2024-07-11 LAB
APPEARANCE UR: CLEAR — SIGNIFICANT CHANGE UP
BACTERIA # UR AUTO: SIGNIFICANT CHANGE UP /HPF
BILIRUB UR-MCNC: ABNORMAL
COLOR SPEC: ABNORMAL
DIFF PNL FLD: ABNORMAL
EPI CELLS # UR: SIGNIFICANT CHANGE UP
GLUCOSE UR QL: NEGATIVE MG/DL — SIGNIFICANT CHANGE UP
KETONES UR-MCNC: NEGATIVE MG/DL — SIGNIFICANT CHANGE UP
LEUKOCYTE ESTERASE UR-ACNC: ABNORMAL
NITRITE UR-MCNC: POSITIVE
PH UR: 5 — SIGNIFICANT CHANGE UP (ref 5–8)
PROT UR-MCNC: 300 MG/DL
RBC CASTS # UR COMP ASSIST: 2 /HPF — SIGNIFICANT CHANGE UP (ref 0–4)
SP GR SPEC: 1.01 — SIGNIFICANT CHANGE UP (ref 1–1.03)
UROBILINOGEN FLD QL: 1 MG/DL — SIGNIFICANT CHANGE UP (ref 0.2–1)
WBC UR QL: 1 /HPF — SIGNIFICANT CHANGE UP (ref 0–5)

## 2024-07-11 PROCEDURE — 93970 EXTREMITY STUDY: CPT | Mod: 26

## 2024-07-11 PROCEDURE — 99232 SBSQ HOSP IP/OBS MODERATE 35: CPT

## 2024-07-11 RX ORDER — MUPIROCIN 20 MG/G
1 OINTMENT TOPICAL
Refills: 0 | Status: DISCONTINUED | OUTPATIENT
Start: 2024-07-11 | End: 2024-07-14

## 2024-07-11 RX ORDER — ALLOPURINOL 300 MG/1
200 TABLET ORAL DAILY
Refills: 0 | Status: DISCONTINUED | OUTPATIENT
Start: 2024-07-11 | End: 2024-07-14

## 2024-07-11 RX ADMIN — HYDROMORPHONE HCL 2 MILLIGRAM(S): 0.2 INJECTION, SOLUTION INTRAVENOUS at 05:53

## 2024-07-11 RX ADMIN — HYDROMORPHONE HCL 2 MILLIGRAM(S): 0.2 INJECTION, SOLUTION INTRAVENOUS at 20:49

## 2024-07-11 RX ADMIN — HYDROMORPHONE HCL 2 MILLIGRAM(S): 0.2 INJECTION, SOLUTION INTRAVENOUS at 10:10

## 2024-07-11 RX ADMIN — Medication 650 MILLIGRAM(S): at 06:30

## 2024-07-11 RX ADMIN — Medication 650 MILLIGRAM(S): at 07:03

## 2024-07-11 RX ADMIN — HYDROMORPHONE HCL 2 MILLIGRAM(S): 0.2 INJECTION, SOLUTION INTRAVENOUS at 09:19

## 2024-07-11 RX ADMIN — HYDROMORPHONE HCL 2 MILLIGRAM(S): 0.2 INJECTION, SOLUTION INTRAVENOUS at 02:37

## 2024-07-11 RX ADMIN — HYDROMORPHONE HCL 2 MILLIGRAM(S): 0.2 INJECTION, SOLUTION INTRAVENOUS at 23:55

## 2024-07-11 RX ADMIN — HYDROMORPHONE HCL 2 MILLIGRAM(S): 0.2 INJECTION, SOLUTION INTRAVENOUS at 06:23

## 2024-07-11 RX ADMIN — HYDROMORPHONE HCL 2 MILLIGRAM(S): 0.2 INJECTION, SOLUTION INTRAVENOUS at 14:25

## 2024-07-11 RX ADMIN — HYDROMORPHONE HCL 2 MILLIGRAM(S): 0.2 INJECTION, SOLUTION INTRAVENOUS at 14:00

## 2024-07-11 RX ADMIN — HYDROMORPHONE HCL 2 MILLIGRAM(S): 0.2 INJECTION, SOLUTION INTRAVENOUS at 03:07

## 2024-07-11 RX ADMIN — ALLOPURINOL 200 MILLIGRAM(S): 300 TABLET ORAL at 17:38

## 2024-07-11 RX ADMIN — HYDROMORPHONE HCL 2 MILLIGRAM(S): 0.2 INJECTION, SOLUTION INTRAVENOUS at 18:52

## 2024-07-11 RX ADMIN — HYDROMORPHONE HCL 2 MILLIGRAM(S): 0.2 INJECTION, SOLUTION INTRAVENOUS at 21:19

## 2024-07-11 RX ADMIN — HYDROMORPHONE HCL 2 MILLIGRAM(S): 0.2 INJECTION, SOLUTION INTRAVENOUS at 17:38

## 2024-07-11 RX ADMIN — HEPARIN SODIUM 5000 UNIT(S): 50 INJECTION, SOLUTION INTRAVENOUS at 05:53

## 2024-07-11 NOTE — PATIENT PROFILE ADULT - FALL HARM RISK - HARM RISK INTERVENTIONS

## 2024-07-11 NOTE — PROGRESS NOTE ADULT - NS ATTEND AMEND GEN_ALL_CORE FT
#Acute pain syndrome 2/2 sickle cell crisis   #Sickle cell anemia   #MATA, resolved  #Hyperbilirubinemia   #Gout  #DVT ppx     44yM with PMH of gout and sickle cell anemia, with multiple admissions for sickle cell crisis, who presented with acute onset abdominal and back pain. Admitted for further management. Patient seen and examined at bedside. This morning with temp 100.3F, reports that he felt warm at home but did not check temperature. At time of evaluation, patient felt back to baseline. Endorses occasional chills. Sepsis workup initiated, though cultures not drawn. Patient agreeable now, if needed, though states he is a hard stick. Reports low back pain.     PE: as above; vitals reviewed, NAD, jaundice, scleral icterus, abdomen soft/nontender, A+Ox3, R-chest port  Labs reviewed: CBC, BMP, Mg, Phos; monitor daily     -Monitor Hgb, baseline ranges 6-7; s/p 1u transfusion pRBCs, hgb stable  -Avoid IVF/excessive transfusions to prevent volume overload   -Pain control   -CXR with no acute findings  -XR pelvis unremarkable  -US Doppler b/l LE negative for DVT   -No further febrile episodes  -Heme/onc following, will continue oxbryta and hold jadenu as recommended  -Pain mgmt following  -F/U TTE, US RUQ  -DVT ppx #Acute pain syndrome 2/2 sickle cell crisis   #Sickle cell anemia   #MATA, resolved  #Hyperbilirubinemia   #Gout  #DVT ppx     44yM with PMH of gout and sickle cell anemia, with multiple admissions for sickle cell crisis, who presented with acute onset abdominal and back pain. Admitted for further management. Patient seen and examined at bedside. This morning with temp 100.3F, reports that he felt warm at home but did not check temperature. At time of evaluation, patient felt back to baseline. Endorses occasional chills. Sepsis workup initiated, though cultures not drawn. Patient agreeable now, if needed, though states he is a hard stick. Reports low back pain.     PE: as above; vitals reviewed, NAD, jaundice, scleral icterus, abdomen soft/nontender, A+Ox3, R-chest port  Labs reviewed: CBC, BMP, Mg, Phos; monitor daily     -Monitor Hgb, baseline ranges 6-7; s/p 1u transfusion pRBCs, hgb stable  -Avoid IVF/excessive transfusions to prevent volume overload   -Pain control   -CXR with no acute findings  -XR pelvis unremarkable  -US Doppler b/l LE negative for DVT   -No further febrile episodes  -Heme/onc following, will continue oxbryta and hold jadenu as recommended  -Pain mgmt following  -F/U TTE  -DVT ppx

## 2024-07-11 NOTE — CONSULT NOTE ADULT - ASSESSMENT
Mr Downey is a pleasant 44 year old gentleman admitted for sickle cell crisis    1. Sickle cell crisis  ·	Patient follows with Dr. Jordan at [facility]  ·	Baseline hb of 6-7  ·	history of sickle cell disease currently on oxbryta and jadenu.  ·	Oxbryta can occasionally cause fevers  ·	Continue with oxbryta while inpatient  ·	Can hold jadenu as it can cause bone marrow suppression and worsen cytopenias  ·	Appreciate pain management recommendations.  Optimize pain control  ·	follow infectious workup  ·	Management per primary team.  ·	daily CBC    Will cont to follow
Search Terms: Alex Downey, 1980Search Date: 07/10/2024 10:12:08 AM  The Drug Utilization Report below displays all of the controlled substance prescriptions, if any, that your patient has filled in the last twelve months. The information displayed on this report is compiled from pharmacy submissions to the Department, and accurately reflects the information as submitted by the pharmacies.    This report was requested by: Meg Wilhelm | Reference #: [id number]    Practitioner Count: 2  Pharmacy Count: 1  Current Opioid Prescriptions: 1  Current Benzodiazepine Prescriptions: 0  Current Stimulant Prescriptions: 0      Patient Demographic Information (PDI)       PDI	First Name	Last Name	Birth Date	Gender	Street Address	City	State	Zip Code  A	Alex Downey	1980	Male	[phone]	[place] [id number]    Prescription Information      PDI Filter:    PDI	My Rx	Current Rx	Drug Type	Rx Written	Rx Dispensed	Drug	Quantity	Days Supply	Prescriber Name	Prescriber NANCY #	Payment Method	Dispenser  A	N	Y	O	06/06/2024	06/19/2024	oxycodone hcl (ir) 30 mg tab	180	30	Jag Ayala	[id number]	Medicaid	[place]   A	N	N	O	05/09/2024	05/17/2024	oxycodone hcl (ir) 30 mg tab	180	30	Jordan, Shirley SOLITARIO	[id number]	Medicaid	[place]   A	N	N	O	04/11/2024	04/19/2024	oxycodone hcl (ir) 30 mg tab	180	30	Jordan, Shirley SOLITARIO	[id number]	Medicaid	[place]   A	N	N	O	03/14/2024	03/25/2024	oxycodone hcl (ir) 30 mg tab	180	30	Jordan, Shirley SOLITARIO	[id number]	Medicaid	[place]   A	N	N	O	02/08/2024	02/20/2024	oxycodone hcl (ir) 30 mg tab	180	30	Jordan, Shirley SOLITARIO	[id number]	Medicaid	[place]   A	N	N	O	01/11/2024	01/16/2024	oxycodone hcl (ir) 30 mg tab	180	30	Jordan, Shirley SOLITARIO	[id number]	Medicaid	[place]   A	N	N	O	12/14/2023	12/18/2023	oxycodone hcl (ir) 30 mg tab	180	30	Jordan, Arsalanh MD	[id number]	Medicaid	[place]   A	N	N	O	11/16/2023	11/17/2023	oxycodone hcl (ir) 30 mg tab	180	30	JordanShirley MD	[id number]	Medicaid	[place]   A	N	N	O	10/18/2023	10/20/2023	oxycodone hcl (ir) 30 mg tab	180	30	Jag Ayala	[id number]	Medicaid	[place]   A	N	N	O	09/14/2023	09/15/2023	oxycodone hcl (ir) 30 mg tab	180	45	Jordan, Shirley SOLITARIO	[id number]	Medicaid	[place]   A	N	N	O	08/17/2023	08/18/2023	oxycodone hcl (ir) 30 mg tab	180	30	Laney Boggs	[id number]	Medicaid	[place]   A	N	N	O	07/20/2023	07/21/2023	oxycodone hcl (ir) 30 mg tab	180	30	Laney Boggs	[id number]	Medicaid	[place]     * - Details of Drug Type : O = Opioid, B = Benzodiazepine, S = Stimulant

## 2024-07-11 NOTE — CONSULT NOTE ADULT - SUBJECTIVE AND OBJECTIVE BOX
Reason for consult:    HPI:  44 year old male from home, ambulates independently, with PMH of gout, antritis and sickle cell diease with chronic anemia and baseline Hb of 6 who c/o generalized body pain and weakness, chest pain and exertional shortness of breath. Started 2 days ago and was reports he was here last month for the same condition. Admits to lower back pain, rib pain, right hip pain that is 9/10.  He also endorses shortness of breath with minimal exertion, such as taking a couple of steps for the past 2 days. The chest pain is intermittent and midsternal and has resolved for now.  Admit to worsening chronic icterus and darking urine, some loose stools, HA, and lightheadedness. Denies any fever, chills, abdominal pain, or dysuria.       Of note, patient has had reaction to blood transfusion in the past and requires premedication with Tylenol and Benadryl.   He was here in May for similar pain with symptomatic anemia.  (10 Jul 2024 09:49)      PAST MEDICAL & SURGICAL HISTORY:  Sickle cell anemia      Gout      History of cholecystectomy          FAMILY HISTORY:  Family history of sickle cell trait (Father, Mother)    Patient's father is  (Father)    Patient's mother is  (Mother)        Alochol: Denied  Smoking: Nonsmoker  Drug Use: Denied  Marital Status:         Allergies    ceftriaxone (Anaphylaxis)  piperacillin-tazobactam (Urticaria)  hydroxyurea (Other)  penicillin (Pruritus)    Intolerances        MEDICATIONS  (STANDING):  chlorhexidine 2% Cloths 1 Application(s) Topical <User Schedule>  heparin   Injectable 5000 Unit(s) SubCutaneous every 8 hours  lactated ringers. 1000 milliLiter(s) (75 mL/Hr) IV Continuous <Continuous>  mupirocin 2% Ointment 1 Application(s) Both Nostrils two times a day    MEDICATIONS  (PRN):  acetaminophen     Tablet .. 650 milliGRAM(s) Oral every 6 hours PRN Temp greater or equal to 38C (100.4F), Mild Pain (1 - 3)  HYDROmorphone  Injectable 2 milliGRAM(s) IV Push every 3 hours PRN Severe Pain (7 - 10)      ROS  14 point ROS negative except for above    T(C): 37.6 (24 @ 07:03), Max: 37.9 (24 @ 05:15)  HR: 115 (24 @ 05:15) (69 - 115)  BP: 151/89 (24 @ 05:15) (145/83 - 160/80)  RR: 17 (24 @ 05:15) (16 - 18)  SpO2: 96% (24 @ 05:15) (93% - 97%)  Wt(kg): --    PE  NAD  Awake, alert  Anicteric, MMM  RRR  CTAB  Abd soft, NT, ND  No c/c/e  No rash grossly  FROM                          7.5    8.86  )-----------( 247      ( 10 Jul 2024 22:35 )             20.4       07-10    135  |  108  |  20<H>  ----------------------------<  103<H>  4.4   |  22  |  1.16    Ca    8.7      10 Jul 2024 12:57    TPro  x   /  Alb  x   /  TBili  11.3<H>  /  DBili  6.2<H>  /  AST  x   /  ALT  x   /  AlkPhos  x   07-10  
Source of information: TOLU VARGAS, Chart review  Patient language: English  : n/a    HPI:  44 year old male from home, ambulates independently, with PMH of gout, antritis and sickle cell diease with chronic anemia and baseline Hb of 6 who c/o generalized body pain and weakness, chest pain and exertional shortness of breath. Started 2 days ago and was reports he was here last month for the same condition. Admits to lower back pain, rib pain, right hip pain that is 9/10.  He also endorses shortness of breath with minimal exertion, such as taking a couple of steps for the past 2 days. The chest pain is intermittent and midsternal and has resolved for now.  Admit to worsening chronic icterus and darking urine, some loose stools, HA, and lightheadedness. Denies any fever, chills, abdominal pain, or dysuria.  Of note, patient has had reaction to blood transfusion in the past and requires premedication with Tylenol and Benadryl.   He was here in May for similar pain with symptomatic anemia.  (10 Jul 2024 09:49)    Pt is admitted for sickle cell crisis, acute chest pain syndrome Retic 17. H/H (5.9/16) on admission, status post 1 unit PRBCs. Pain consulted on 7/10 for management of sickle cell crisis. Patient is well known to pain service and was seen during last admission in May 2024. Pt seen and examined while in ED this morning. At time of assessment, patient laying in bed, A&Ox4, reports lumbar back and b/l rib pain score 9/10 and not tolerable at this time SCALE USED: (1-10 VNRS). Patient reports pain exacerbation started at 5pm yesterday and has been unbearable since. Pt describes pain as localized to lumbar spine with radiation to bilateral rib cage and bilateral lower extremities, constant, sharp, and throbbing in quality. The pain is exacerbated by movement and palpation. Pt tolerating PO diet. Denies lethargy, chest pain,  nausea, vomiting, constipation. He reports mild SOB, O2 via nasal cannula in place. Reports last BM . Patient stated goal for pain control: to be able to take deep breaths, get out of bed to chair and ambulate with tolerable pain control. Pt takes oxycodone 30mg q 4hrs PRN for severe pain at home, verified on ISTOP.    PAST MEDICAL & SURGICAL HISTORY:  Sickle cell anemia    Gout    History of cholecystectomy  FAMILY HISTORY:  Family history of sickle cell trait (Father, Mother)    Patient's father is  (Father)    Patient's mother is  (Mother)    Social History:  [x] Denies ETOH use, illicit drug use and smoking    Allergies    ceftriaxone (Anaphylaxis)  piperacillin-tazobactam (Urticaria)  hydroxyurea (Other)  penicillin (Pruritus)    Intolerances    MEDICATIONS  (STANDING):    MEDICATIONS  (PRN):  acetaminophen     Tablet .. 650 milliGRAM(s) Oral every 6 hours PRN Temp greater or equal to 38C (100.4F), Mild Pain (1 - 3)  HYDROmorphone  Injectable 2 milliGRAM(s) IV Push every 3 hours PRN Severe Pain (7 - 10)    Vital Signs Last 24 Hrs  T(C): 36.3 (10 Jul 2024 11:15), Max: 36.8 (10 Jul 2024 00:05)  T(F): 97.4 (10 Jul 2024 11:15), Max: 98.2 (10 Jul 2024 00:05)  HR: 69 (10 Jul 2024 11:15) (69 - 100)  BP: 145/83 (10 Jul 2024 11:15) (144/88 - 151/88)  BP(mean): --  RR: 18 (10 Jul 2024 11:15) (16 - 19)  SpO2: 97% (10 Jul 2024 11:15) (90% - 100%)    Parameters below as of 10 Jul 2024 11:15  Patient On (Oxygen Delivery Method): room air    LABS: Reviewed.                          7.2    8.27  )-----------( 237      ( 10 Jul 2024 09:40 )             20.3     07-10    134<L>  |  105  |  20<H>  ----------------------------<  139<H>  4.2   |  22  |  1.50<H>    Ca    8.3<L>      10 Jul 2024 01:30    TPro  7.3  /  Alb  3.0<L>  /  TBili  8.6<H>  /  DBili  5.0<H>  /  AST  179<H>  /  ALT  66<H>  /  AlkPhos  142<H>  07-10    LIVER FUNCTIONS - ( 10 Jul 2024 01:30 )  Alb: 3.0 g/dL / Pro: 7.3 g/dL / ALK PHOS: 142 U/L / ALT: 66 U/L DA / AST: 179 U/L / GGT: x           Urinalysis Basic - ( 10 Jul 2024 01:30 )    Color: x / Appearance: x / SG: x / pH: x  Gluc: 139 mg/dL / Ketone: x  / Bili: x / Urobili: x   Blood: x / Protein: x / Nitrite: x   Leuk Esterase: x / RBC: x / WBC x   Sq Epi: x / Non Sq Epi: x / Bacteria: x    CAPILLARY BLOOD GLUCOSE    Radiology: None to Review.     ORT Score -   Family Hx of substance abuse	Female	      Male  Alcohol 	                                           1                     3  Illegal drugs	                                   2                     3  Rx drugs                                           4 	                  4  Personal Hx of substance abuse		  Alcohol 	                                          3	                  3  Illegal drugs                                     4	                  4  Rx drugs                                            5 	                  5  Age between 16- 45 years	           1                     1  hx preadolescent sexual abuse	   3 	                  0  Psychological disease		  ADD, OCD, bipolar, schizophrenia   2	          2  Depression                                           1 	          1  Total: 0    a score of 3 or lower indicates low risk for opioid abuse		  a score of 4-7 indicates moderate risk for opioid abuse		  a score of 8 or higher indicates high risk for opioid abuse    REVIEW OF SYSTEMS:  CONSTITUTIONAL: No fever or fatigue  HEENT:  No difficulty hearing, no change in vision  NECK: No pain or stiffness  RESPIRATORY: No cough, wheezing, chills or hemoptysis; + SOB  CARDIOVASCULAR: No chest pain, palpitations, dizziness, or leg swelling  GASTROINTESTINAL: No loss of appetite, decreased PO intake. No abdominal or epigastric pain. No nausea, vomiting; No diarrhea or constipation.   GENITOURINARY: No dysuria, frequency, hematuria, retention or incontinence  MUSCULOSKELETAL: + b/l rib and lumbar back pain. No joint swelling; no upper or lower motor strength weakness, no saddle anesthesia, bowel/bladder incontinence, no falls   NEURO: No headaches, No numbness/tingling b/l LE, No weakness    PHYSICAL EXAM:  GENERAL:  Alert & Oriented X4, cooperative, NAD, Good concentration. Speech is clear, + b/l icterus   RESPIRATORY: Respirations even and unlabored. Clear to auscultation bilaterally, O2 via nasal cannula in place.  CARDIOVASCULAR: Normal S1/S2, regular rate and rhythm; No murmurs, rubs, or gallops. No JVD. + right chest wall mediport accessed, dressing c/d/i   GASTROINTESTINAL:  Soft, Nontender, Nondistended; Bowel sounds present  PERIPHERAL VASCULAR:  Extremities warm without edema. 2+ Peripheral Pulses, No cyanosis, No calf tenderness  MUSCULOSKELETAL: Motor Strength 5/5 B/L upper and lower extremities;+ lumbar back tenderness on palpation   SKIN: Warm, dry, intact    Risk factors associated with adverse outcomes related to opioid treatment  [ ] Concurrent benzodiazepine use  [ ] History/ Active substance use or alcohol use disorder  [ ] Psychiatric co-morbidity  [ ] Sleep apnea  [ ] COPD  [ ] BMI> 35  [x] Liver dysfunction  [ ] Renal dysfunction  [ ] CHF  [ ] Smoker  [ ] Age > 60 years    [x]  [place]  Reviewed and Copied to Chart. See below.    Plan of care and goal oriented pain management treatment options were discussed with patient and /or primary care giver; all questions and concerns were addressed and care was aligned with patient's wishes.    Educated patient on goal oriented pain management treatment options

## 2024-07-11 NOTE — CHART NOTE - NSCHARTNOTEFT_GEN_A_CORE
EVENT: Temp 100.3 this AM    BRIEF HPI: 44 year old male from home, ambulates independently, with PMH of gout, antritis and sickle cell diease with chronic anemia and baseline Hb of 6 who c/o generalized body pain and weakness, chest pain and exertional shortness of breath. Started 2 days ago and was reports he was here last month for the same condition. Trops neg . EKG NSR. Chest xray shows: Pulmonary vascular congestion. No airspace consolidation or effusion. hemoglobin 5.9. Direct bilirubin 5.0, Indirect bilirubin is 3.9. LDh is 1257. Patient is being admitted for symptomatica anemia 2/2 to sickle cell crises.     Vital Signs Last 24 Hrs  T(C): 37.9 (11 Jul 2024 05:15), Max: 37.9 (11 Jul 2024 05:15)  T(F): 100.3 (11 Jul 2024 05:15), Max: 100.3 (11 Jul 2024 05:15)  HR: 115 (11 Jul 2024 05:15) (69 - 115)  BP: 151/89 (11 Jul 2024 05:15) (145/83 - 160/80)  BP(mean): --  RR: 17 (11 Jul 2024 05:15) (16 - 18)  SpO2: 96% (11 Jul 2024 05:15) (93% - 99%)    Parameters below as of 11 Jul 2024 05:15  Patient On (Oxygen Delivery Method): room air    PLAN  Septic work up. Pt with a chest port X 10 years. Refusing to have blood drawn for culture. Will send UA and sputum until pt agreeable.    FOLLOW UP: lab results EVENT: Temp 100.3 this AM on chart review    BRIEF HPI: 44 year old male from home, ambulates independently, with PMH of gout, antritis and sickle cell diease with chronic anemia and baseline Hb of 6 who c/o generalized body pain and weakness, chest pain and exertional shortness of breath. Started 2 days ago and was reports he was here last month for the same condition. Trops neg . EKG NSR. Chest x-ray shows: Pulmonary vascular congestion. No airspace consolidation or effusion. hemoglobin 5.9. Direct bilirubin 5.0, Indirect bilirubin is 3.9. LDH is 1257. Patient is being admitted for symptomatica anemia 2/2 to sickle cell crises.     Vital Signs Last 24 Hrs  T(C): 37.9 (11 Jul 2024 05:15), Max: 37.9 (11 Jul 2024 05:15)  T(F): 100.3 (11 Jul 2024 05:15), Max: 100.3 (11 Jul 2024 05:15)  HR: 115 (11 Jul 2024 05:15) (69 - 115)  BP: 151/89 (11 Jul 2024 05:15) (145/83 - 160/80)  BP(mean): --  RR: 17 (11 Jul 2024 05:15) (16 - 18)  SpO2: 96% (11 Jul 2024 05:15) (93% - 99%)    Parameters below as of 11 Jul 2024 05:15  Patient On (Oxygen Delivery Method): room air    PLAN  Septic work up. Pt with a chest port X 10 years. Refusing to have blood drawn for culture. Will send UA with reflex and sputum culture until pt agreeable to blood for lactate and culture    FOLLOW UP: lab results EVENT: Temp 100.3 this AM on chart review    BRIEF HPI: 44 year old male from home, ambulates independently, with PMH of gout, antritis and sickle cell diease with chronic anemia and baseline Hb of 6 who c/o generalized body pain and weakness, chest pain and exertional shortness of breath. Started 2 days ago and was reports he was here last month for the same condition. Trops neg . EKG NSR. Chest x-ray shows: Pulmonary vascular congestion. No airspace consolidation or effusion. hemoglobin 5.9. Direct bilirubin 5.0, Indirect bilirubin is 3.9. LDH is 1257. Patient is being admitted for symptomatica anemia 2/2 to sickle cell crises.     Vital Signs Last 24 Hrs  T(C): 37.9 (11 Jul 2024 05:15), Max: 37.9 (11 Jul 2024 05:15)  T(F): 100.3 (11 Jul 2024 05:15), Max: 100.3 (11 Jul 2024 05:15)  HR: 115 (11 Jul 2024 05:15) (69 - 115)  BP: 151/89 (11 Jul 2024 05:15) (145/83 - 160/80)  BP(mean): --  RR: 17 (11 Jul 2024 05:15) (16 - 18)  SpO2: 96% (11 Jul 2024 05:15) (93% - 99%)    Parameters below as of 11 Jul 2024 05:15  Patient On (Oxygen Delivery Method): room air    FOCUSSED PE  CV: S1 S2 sinus tach  RESP: Even, unlabored  NEURO: Alert, oriented X 4    PLAN  Septic work up. Pt with a chest port X 10 years. Refusing to have blood drawn for culture. Will send UA with reflex and sputum culture until pt agreeable to blood for lactate and culture    FOLLOW UP: lab results

## 2024-07-12 ENCOUNTER — TRANSCRIPTION ENCOUNTER (OUTPATIENT)
Age: 44
End: 2024-07-12

## 2024-07-12 ENCOUNTER — RESULT REVIEW (OUTPATIENT)
Age: 44
End: 2024-07-12

## 2024-07-12 LAB
ALBUMIN SERPL ELPH-MCNC: 2.9 G/DL — LOW (ref 3.5–5)
ALP SERPL-CCNC: 129 U/L — HIGH (ref 40–120)
ALT FLD-CCNC: 71 U/L DA — HIGH (ref 10–60)
ANION GAP SERPL CALC-SCNC: 5 MMOL/L — SIGNIFICANT CHANGE UP (ref 5–17)
AST SERPL-CCNC: 216 U/L — HIGH (ref 10–40)
BILIRUB SERPL-MCNC: 15.1 MG/DL — HIGH (ref 0.2–1.2)
BUN SERPL-MCNC: 24 MG/DL — HIGH (ref 7–18)
CALCIUM SERPL-MCNC: 8.8 MG/DL — SIGNIFICANT CHANGE UP (ref 8.4–10.5)
CHLORIDE SERPL-SCNC: 108 MMOL/L — SIGNIFICANT CHANGE UP (ref 96–108)
CO2 SERPL-SCNC: 22 MMOL/L — SIGNIFICANT CHANGE UP (ref 22–31)
CREAT SERPL-MCNC: 1.16 MG/DL — SIGNIFICANT CHANGE UP (ref 0.5–1.3)
EGFR: 79 ML/MIN/1.73M2 — SIGNIFICANT CHANGE UP
GLUCOSE SERPL-MCNC: 114 MG/DL — HIGH (ref 70–99)
HCT VFR BLD CALC: 18.1 % — CRITICAL LOW (ref 39–50)
HGB BLD-MCNC: 6.6 G/DL — CRITICAL LOW (ref 13–17)
MCHC RBC-ENTMCNC: 30.6 PG — SIGNIFICANT CHANGE UP (ref 27–34)
MCHC RBC-ENTMCNC: 36.5 GM/DL — HIGH (ref 32–36)
MCV RBC AUTO: 83.8 FL — SIGNIFICANT CHANGE UP (ref 80–100)
NRBC # BLD: 0 /100 WBCS — SIGNIFICANT CHANGE UP (ref 0–0)
PLATELET # BLD AUTO: 212 K/UL — SIGNIFICANT CHANGE UP (ref 150–400)
POTASSIUM SERPL-MCNC: 4.5 MMOL/L — SIGNIFICANT CHANGE UP (ref 3.5–5.3)
POTASSIUM SERPL-SCNC: 4.5 MMOL/L — SIGNIFICANT CHANGE UP (ref 3.5–5.3)
PROT SERPL-MCNC: 7.3 G/DL — SIGNIFICANT CHANGE UP (ref 6–8.3)
RBC # BLD: 2.16 M/UL — LOW (ref 4.2–5.8)
RBC # FLD: 22.8 % — HIGH (ref 10.3–14.5)
SODIUM SERPL-SCNC: 135 MMOL/L — SIGNIFICANT CHANGE UP (ref 135–145)
WBC # BLD: 10.89 K/UL — HIGH (ref 3.8–10.5)
WBC # FLD AUTO: 10.89 K/UL — HIGH (ref 3.8–10.5)

## 2024-07-12 PROCEDURE — 99232 SBSQ HOSP IP/OBS MODERATE 35: CPT

## 2024-07-12 PROCEDURE — 71045 X-RAY EXAM CHEST 1 VIEW: CPT | Mod: 26

## 2024-07-12 RX ORDER — DIPHENHYDRAMINE HCL 12.5MG/5ML
50 ELIXIR ORAL ONCE
Refills: 0 | Status: COMPLETED | OUTPATIENT
Start: 2024-07-12 | End: 2024-07-12

## 2024-07-12 RX ORDER — DIPHENHYDRAMINE HCL 12.5MG/5ML
50 ELIXIR ORAL ONCE
Refills: 0 | Status: DISCONTINUED | OUTPATIENT
Start: 2024-07-12 | End: 2024-07-12

## 2024-07-12 RX ADMIN — Medication 1 APPLICATION(S): at 06:07

## 2024-07-12 RX ADMIN — HYDROMORPHONE HCL 2 MILLIGRAM(S): 0.2 INJECTION, SOLUTION INTRAVENOUS at 03:31

## 2024-07-12 RX ADMIN — HYDROMORPHONE HCL 2 MILLIGRAM(S): 0.2 INJECTION, SOLUTION INTRAVENOUS at 18:27

## 2024-07-12 RX ADMIN — HEPARIN SODIUM 5000 UNIT(S): 50 INJECTION, SOLUTION INTRAVENOUS at 15:36

## 2024-07-12 RX ADMIN — MUPIROCIN 1 APPLICATION(S): 20 OINTMENT TOPICAL at 06:08

## 2024-07-12 RX ADMIN — HYDROMORPHONE HCL 2 MILLIGRAM(S): 0.2 INJECTION, SOLUTION INTRAVENOUS at 13:00

## 2024-07-12 RX ADMIN — Medication 50 MILLIGRAM(S): at 10:48

## 2024-07-12 RX ADMIN — HYDROMORPHONE HCL 2 MILLIGRAM(S): 0.2 INJECTION, SOLUTION INTRAVENOUS at 06:34

## 2024-07-12 RX ADMIN — HYDROMORPHONE HCL 2 MILLIGRAM(S): 0.2 INJECTION, SOLUTION INTRAVENOUS at 21:31

## 2024-07-12 RX ADMIN — HYDROMORPHONE HCL 2 MILLIGRAM(S): 0.2 INJECTION, SOLUTION INTRAVENOUS at 00:25

## 2024-07-12 RX ADMIN — MUPIROCIN 1 APPLICATION(S): 20 OINTMENT TOPICAL at 18:27

## 2024-07-12 RX ADMIN — HYDROMORPHONE HCL 2 MILLIGRAM(S): 0.2 INJECTION, SOLUTION INTRAVENOUS at 03:01

## 2024-07-12 RX ADMIN — Medication 650 MILLIGRAM(S): at 10:48

## 2024-07-12 RX ADMIN — HYDROMORPHONE HCL 2 MILLIGRAM(S): 0.2 INJECTION, SOLUTION INTRAVENOUS at 21:34

## 2024-07-12 RX ADMIN — HYDROMORPHONE HCL 2 MILLIGRAM(S): 0.2 INJECTION, SOLUTION INTRAVENOUS at 10:10

## 2024-07-12 RX ADMIN — HYDROMORPHONE HCL 2 MILLIGRAM(S): 0.2 INJECTION, SOLUTION INTRAVENOUS at 16:00

## 2024-07-12 RX ADMIN — Medication 650 MILLIGRAM(S): at 11:00

## 2024-07-12 RX ADMIN — ALLOPURINOL 200 MILLIGRAM(S): 300 TABLET ORAL at 11:46

## 2024-07-12 RX ADMIN — HYDROMORPHONE HCL 2 MILLIGRAM(S): 0.2 INJECTION, SOLUTION INTRAVENOUS at 06:04

## 2024-07-12 RX ADMIN — HYDROMORPHONE HCL 2 MILLIGRAM(S): 0.2 INJECTION, SOLUTION INTRAVENOUS at 12:29

## 2024-07-12 RX ADMIN — HYDROMORPHONE HCL 2 MILLIGRAM(S): 0.2 INJECTION, SOLUTION INTRAVENOUS at 15:36

## 2024-07-12 RX ADMIN — HYDROMORPHONE HCL 2 MILLIGRAM(S): 0.2 INJECTION, SOLUTION INTRAVENOUS at 09:06

## 2024-07-12 NOTE — DISCHARGE NOTE PROVIDER - NSDCMRMEDTOKEN_GEN_ALL_CORE_FT
acetaminophen 325 mg oral tablet: 2 tab(s) orally every 6 hours As needed Temp greater or equal to 38C (100.4F), Mild Pain (1 - 3)  allopurinol 200 mg oral tablet: 1 tab(s) orally once a day  folic acid 1 mg oral tablet: 1 tab(s) orally every 24 hours  omeprazole 20 mg oral delayed release capsule: 1 cap(s) orally once a day  oxyCODONE 30 mg oral tablet: 1 tab(s) orally every 4 hours as needed for  severe pain  polyethylene glycol 3350 oral powder for reconstitution: 17 gram(s) orally once a day  senna leaf extract oral tablet: 2 tab(s) orally once a day (at bedtime) Do not use if you develop diarrhea

## 2024-07-12 NOTE — PROGRESS NOTE ADULT - PROBLEM SELECTOR PLAN 3
CXR shows Pulmonary vascular congestion. No airspace consolidation or effusion.  Asymptomatic  S/I 2U prbc  monitor closely for symptoms of fluid overload, may need IV Lasix if symptomatic  F/U echo  F/U repeat CXR from 7/12
CXR shows Pulmonary vascular congestion. No airspace consolidation or effusion.  Asymptomatic  S/I 1U prbc  monitor closely for symptoms of fluid overload, may need IV Lasix if symptomatic  F/U echo

## 2024-07-12 NOTE — DISCHARGE NOTE PROVIDER - HOSPITAL COURSE
43 year old male from home, ambulates independently, with PMH of gout and sickle cell diease with chronic anemia and baseline Hb of 6 who presented to the ED generalized pain. He has had more frequent sickle cell crisis from yearly to every 2-3 months. Was transitioned to Oxbryta (received shipement on Tuesday but did not start yet). Sent from Dr. Jordan's office for worsening pain and low Hgb  Patient with leukocytosis to 14K and H/H 5.6/15.5   Patient also with MATA of 1.51 (baseline <1) and elevated liver enzymes which appear stable since prior admissions.   Pt admitted for symptomatic anemia and sickle cell crisis. Hgb 5.6, ordered 2 units PRBC. Pain management and heme/onc consulted.     course c/b temp 100.3F. Sepsis workup initiated. cultures ????????    case discussed with attending, pt medically stable for d/c. Please note that this a brief summary of hospital course please refer to daily progress notes and consult notes for full course and events      incomplete 7/12--->   43 year old male from home, ambulates independently, with PMH of gout and sickle cell diease with chronic anemia and baseline Hb of 6 who presented to the ED generalized pain. He has had more frequent sickle cell crisis from yearly to every 2-3 months. Was transitioned to Oxbryta (received shipement on Tuesday but did not start yet). Sent from Dr. Jordan's office for worsening pain and low Hgb. In the ed, patient with leukocytosis to 14K and H/H 5.6/15.5; also with MATA of 1.51 (baseline <1) and elevated liver enzymes which appear stable since prior admissions. Pt admitted for symptomatic anemia and sickle cell crisis. Hgb 5.6, ordered 2 units PRBC. Pain management and heme/onc consulted.     course c/b temp 100.3F. CXR with no acute findings. Oxygen saturation 90-92% on room air, though patient states he is feeling short of breath with ambulation. Likely due to Dilaudid use.    Heme/onc reccs: Baseline hemoglobin around 5-6, though hgb higher last few days after PRBC, hgb lower today with acute rise in bili, O2 requirement, c/w VOC today  no significant sx for ACS but low threshold to consult MICU if with sx of fever, worsened SOB, CP    Hgb 6.9 will give 3rd prbc and d/c home w/ out pt heme/onc follow- up per attending.    case discussed with attending, pt medically stable for d/c. Please note that this a brief summary of hospital course please refer to daily progress notes and consult notes for full course and events

## 2024-07-12 NOTE — DISCHARGE NOTE PROVIDER - CARE PROVIDER_API CALL
[facility]  Medical Oncology  [address] [place] [id number]  Phone: [phone]  Fax: [phone]  Established Patient  Follow Up Time: 1 week

## 2024-07-12 NOTE — PROGRESS NOTE ADULT - PROBLEM SELECTOR PLAN 6
Likely reactive from sickle cell  MRCP March 2024 shows: Iron deposition in the liver and kidneys compatible with sickle cell   disease, intravascular hemolysis and numerous transfusions.  Monitor LFT.
Likely reactive from sickle cell  MRCP March 2024 shows: Iron deposition in the liver and kidneys compatible with sickle cell   disease, intravascular hemolysis and numerous transfusions.  Monitor LFT.

## 2024-07-12 NOTE — PROGRESS NOTE ADULT - PROBLEM SELECTOR PROBLEM 5
Continue Regimen: clobetasol 0.05 % topical gel \\nSig: apply a thin layer to affected areas on scalp qhs x 7 days per month
Detail Level: Zone
MATA (acute kidney injury)
MATA (acute kidney injury)

## 2024-07-12 NOTE — PROGRESS NOTE ADULT - PROBLEM SELECTOR PLAN 5
Likely from sickle cell  Gentle hydration  Scr trending down  Trend BMP
Likely from sickle cell  Gentle hydration  Scr trending down  Trend BMP

## 2024-07-12 NOTE — PROGRESS NOTE ADULT - NS ATTEND AMEND GEN_ALL_CORE FT
#Acute pain syndrome 2/2 sickle cell crisis   #Sickle cell anemia   #MATA, resolved  #Hyperbilirubinemia   #Gout  #DVT ppx     44yM with PMH of gout and sickle cell anemia, with multiple admissions for sickle cell crisis, who presented with acute onset abdominal and back pain. Admitted for further management. Patient seen and examined at bedside. No acute events overnight. Endorses low back pain. Patient states that he feels short of breath from just walking from his bed to the door.     PE: as above; vitals reviewed, NAD, jaundice, scleral icterus, abdomen soft/nontender, A+Ox3, R-chest port  Labs reviewed: CBC, BMP, Mg, Phos; monitor daily     -Monitor Hgb, dropped to 6.6 this morning, will transfuse 1u pRBC  -Avoid IVF/excessive transfusions to prevent volume overload   -Oxygen saturation 90-92% on room air, though patient states he is feeling short of breath with ambulation  -Pain control   -CXR with no acute findings; repeat CXR ordered today - will start antibiotics if any abnormal findings  -XR pelvis unremarkable  -US Doppler b/l LE negative for DVT   -No further febrile episodes, cultures not obtained  -Heme/onc following, will continue oxbryta and hold jadenu as recommended; discussed with Dr. Perez  -Pain mgmt following  -F/U TTE  -DVT ppx

## 2024-07-12 NOTE — PROGRESS NOTE ADULT - PROBLEM SELECTOR PLAN 2
history of sickle cell disease currently on oxbryta and jadenu.  Oxbryta can occasionally cause fevers  p/w generalized pain weakness  Pain control; resuming prior pain management recs  c/w Dilaudid 2mg IV Q3  Pain Management consulted  Upon DC Start: Oxbryta (voxelotor) hemoglobin S (HbS) polymerization inhibitor
history of sickle cell disease currently on oxbryta and jadenu.  Oxbryta can occasionally cause fevers  p/w generalized pain weakness  Pain control; resuming prior pain management recs  c/w Dilaudid 2mg IV Q3  Pain Management consulted  Upon DC Start: Oxbryta (voxelotor) hemoglobin S (HbS) polymerization inhibitor

## 2024-07-12 NOTE — PROGRESS NOTE ADULT - PROBLEM SELECTOR PLAN 1
p/w generalized pain weakness  f/u with Dr. Jordan outpatient hematologist   Hgb 5.6  S/P 2U pRBC, will require benadryl prior  Trend CBC post   Heme/onc consulted, reccs: Be cautious with transfusion for risk of volume overload
p/w generalized pain weakness  f/u with Dr. Jordan outpatient hematologist   Hgb 5.6  S/P 1U pRBC, will require benadryl prior  Trend CBC post Tx > 7.2 > 7.5  Heme/onc consulted, reccs: Be cautious with transfusion for risk of volume overload

## 2024-07-12 NOTE — DISCHARGE NOTE PROVIDER - ATTENDING DISCHARGE PHYSICAL EXAMINATION:
Constitutional/General: Well developed, vitals reviewed  EYE: Symmetrical pupils, conjunctiva clear, b/l icterus  ENT: Good dentition, oropharynx clear  NECK: No visual masses, no JVD  CHEST: No respiratory distress, bilateral symmetrical chest rise  ABDOMEN: Nondistended, no visual masses  SKIN: No rash, warm, dry, jaundice  NEURO: A+Ox3, Cranial nerves grossly intact, moves all extremities, follows commands  PSYCH: Normal mood, normal affect

## 2024-07-12 NOTE — DISCHARGE NOTE PROVIDER - NSDCCPCAREPLAN_GEN_ALL_CORE_FT
PRINCIPAL DISCHARGE DIAGNOSIS  Diagnosis: Sickle cell crisis  Assessment and Plan of Treatment: You presented to the hospital with complaints of generalized body pain. Your hemoglobin was found to be low and you were transfused with packed red blood cells. Your hemoglobin was monitored and remained stable. You were followed by hematologist who reccommends: Maintain above 5 or transfuse if symptomatic.   A sickle cell crisis is a painful episode that occurs in people who have sickle cell anemia. It happens when sickle-shaped red blood cells (RBCs) block blood vessels. Blood and oxygen cannot get to tissues, causing pain. A sickle cell crisis can also damage your tissues and cause organ failure, such liver or kidney failure. A sickle cell crisis can become life-threatening.  Call your local emergency number (911 in the [place]) if:  You have shortness of breath or chest pain.  Prevent a sickle cell crisis: Take vitamins and minerals as directed. Folic acid may help prevent blood vessel problems that can occur with sickle cell anemia. Zinc may decrease how often you have pain.  Drink liquids as directed. Dehydration can increase your risk for a sickle cell crisis. .Balance rest and exercise. Rest during a sickle cell crisis. Wash your hands frequently. Handwashing can help prevent illness. Avoid quick changes in temperature. Do not smoke cigarettes or drink alcohol. Ask about vaccines you may need. Vaccines can help prevent a viral infection that may lead to a sickle cell crisis  Please follow- up with Dr. Jordan.        SECONDARY DISCHARGE DIAGNOSES  Diagnosis: Symptomatic anemia  Assessment and Plan of Treatment: You were found to have a low blood count on admission. Anemia is a low number of red blood cells. Some causes of anemia include: A gastrointestinal bleed, Liver or kidney disease, cancer, Alcohol abuse and Lack of foods that contain iron, folic acid, or vitamin B12. Symptoms to report, bleeding, palpitations, fatigue, pale skin, cold skin, dizziness. Take medications as ordered by PCP.  Follow- up with your PCP.      Diagnosis: MATA (acute kidney injury)  Assessment and Plan of Treatment: You presented with Elevated Creatinine likely multifactorial due to dehydration and anemia. You were treated with IV fluids, blood transfusion and your kidney funtion improved. Please continue oral hydration as much as possible within the daily drinking limit of 2 L per day  Prevent acute kidney injury: Manage other health conditions such as diabetes, high blood pressure, or heart disease. These conditions increase your risk for acute kidney injury. Talk to your healthcare provider before you take over-the-counter-medicine. NSAIDs, stomach medicine, or laxatives may harm your kidneys and increase your risk for acute kidney injury. Tell healthcare providers you have had acute kidney injury before you get contrast liquid for an x-ray or CT scan.   Follow up with your healthcare provider      Diagnosis: Gout  Assessment and Plan of Treatment: You have a history of Gout. Continue with medication as prescribed.  Follow-up with your primary care physician .  Call your physician if you develop pain not relieved with pain regimen, fever and or swelling/redness in your extremity (ies).

## 2024-07-13 LAB
ALBUMIN SERPL ELPH-MCNC: 2.7 G/DL — LOW (ref 3.5–5)
ALP SERPL-CCNC: 123 U/L — HIGH (ref 40–120)
ALT FLD-CCNC: 66 U/L DA — HIGH (ref 10–60)
ANION GAP SERPL CALC-SCNC: 4 MMOL/L — LOW (ref 5–17)
AST SERPL-CCNC: 178 U/L — HIGH (ref 10–40)
BILIRUB SERPL-MCNC: 13.5 MG/DL — HIGH (ref 0.2–1.2)
BUN SERPL-MCNC: 22 MG/DL — HIGH (ref 7–18)
CALCIUM SERPL-MCNC: 8.8 MG/DL — SIGNIFICANT CHANGE UP (ref 8.4–10.5)
CHLORIDE SERPL-SCNC: 108 MMOL/L — SIGNIFICANT CHANGE UP (ref 96–108)
CO2 SERPL-SCNC: 24 MMOL/L — SIGNIFICANT CHANGE UP (ref 22–31)
CREAT SERPL-MCNC: 0.95 MG/DL — SIGNIFICANT CHANGE UP (ref 0.5–1.3)
EGFR: 101 ML/MIN/1.73M2 — SIGNIFICANT CHANGE UP
GLUCOSE SERPL-MCNC: 106 MG/DL — HIGH (ref 70–99)
HCT VFR BLD CALC: 19.4 % — CRITICAL LOW (ref 39–50)
HGB BLD-MCNC: 7.1 G/DL — LOW (ref 13–17)
MCHC RBC-ENTMCNC: 29.7 PG — SIGNIFICANT CHANGE UP (ref 27–34)
MCHC RBC-ENTMCNC: 36.6 GM/DL — HIGH (ref 32–36)
MCV RBC AUTO: 81.2 FL — SIGNIFICANT CHANGE UP (ref 80–100)
NRBC # BLD: 0 /100 WBCS — SIGNIFICANT CHANGE UP (ref 0–0)
PLATELET # BLD AUTO: 192 K/UL — SIGNIFICANT CHANGE UP (ref 150–400)
POTASSIUM SERPL-MCNC: 4.3 MMOL/L — SIGNIFICANT CHANGE UP (ref 3.5–5.3)
POTASSIUM SERPL-SCNC: 4.3 MMOL/L — SIGNIFICANT CHANGE UP (ref 3.5–5.3)
PROT SERPL-MCNC: 7.1 G/DL — SIGNIFICANT CHANGE UP (ref 6–8.3)
RBC # BLD: 2.39 M/UL — LOW (ref 4.2–5.8)
RBC # FLD: 22 % — HIGH (ref 10.3–14.5)
SODIUM SERPL-SCNC: 136 MMOL/L — SIGNIFICANT CHANGE UP (ref 135–145)
WBC # BLD: 11.07 K/UL — HIGH (ref 3.8–10.5)
WBC # FLD AUTO: 11.07 K/UL — HIGH (ref 3.8–10.5)

## 2024-07-13 PROCEDURE — 99232 SBSQ HOSP IP/OBS MODERATE 35: CPT

## 2024-07-13 RX ADMIN — HYDROMORPHONE HCL 2 MILLIGRAM(S): 0.2 INJECTION, SOLUTION INTRAVENOUS at 00:31

## 2024-07-13 RX ADMIN — HYDROMORPHONE HCL 2 MILLIGRAM(S): 0.2 INJECTION, SOLUTION INTRAVENOUS at 14:52

## 2024-07-13 RX ADMIN — HYDROMORPHONE HCL 2 MILLIGRAM(S): 0.2 INJECTION, SOLUTION INTRAVENOUS at 10:00

## 2024-07-13 RX ADMIN — HYDROMORPHONE HCL 2 MILLIGRAM(S): 0.2 INJECTION, SOLUTION INTRAVENOUS at 11:27

## 2024-07-13 RX ADMIN — HYDROMORPHONE HCL 2 MILLIGRAM(S): 0.2 INJECTION, SOLUTION INTRAVENOUS at 03:36

## 2024-07-13 RX ADMIN — MUPIROCIN 1 APPLICATION(S): 20 OINTMENT TOPICAL at 18:54

## 2024-07-13 RX ADMIN — HYDROMORPHONE HCL 2 MILLIGRAM(S): 0.2 INJECTION, SOLUTION INTRAVENOUS at 05:46

## 2024-07-13 RX ADMIN — HYDROMORPHONE HCL 2 MILLIGRAM(S): 0.2 INJECTION, SOLUTION INTRAVENOUS at 08:03

## 2024-07-13 RX ADMIN — HYDROMORPHONE HCL 2 MILLIGRAM(S): 0.2 INJECTION, SOLUTION INTRAVENOUS at 12:00

## 2024-07-13 RX ADMIN — Medication 1 APPLICATION(S): at 05:36

## 2024-07-13 RX ADMIN — HEPARIN SODIUM 5000 UNIT(S): 50 INJECTION, SOLUTION INTRAVENOUS at 14:54

## 2024-07-13 RX ADMIN — HEPARIN SODIUM 5000 UNIT(S): 50 INJECTION, SOLUTION INTRAVENOUS at 05:36

## 2024-07-13 RX ADMIN — HYDROMORPHONE HCL 2 MILLIGRAM(S): 0.2 INJECTION, SOLUTION INTRAVENOUS at 21:40

## 2024-07-13 RX ADMIN — MUPIROCIN 1 APPLICATION(S): 20 OINTMENT TOPICAL at 05:36

## 2024-07-13 RX ADMIN — HYDROMORPHONE HCL 2 MILLIGRAM(S): 0.2 INJECTION, SOLUTION INTRAVENOUS at 19:00

## 2024-07-13 RX ADMIN — ALLOPURINOL 200 MILLIGRAM(S): 300 TABLET ORAL at 11:16

## 2024-07-13 RX ADMIN — HYDROMORPHONE HCL 2 MILLIGRAM(S): 0.2 INJECTION, SOLUTION INTRAVENOUS at 21:10

## 2024-07-13 RX ADMIN — HYDROMORPHONE HCL 2 MILLIGRAM(S): 0.2 INJECTION, SOLUTION INTRAVENOUS at 05:45

## 2024-07-13 RX ADMIN — HYDROMORPHONE HCL 2 MILLIGRAM(S): 0.2 INJECTION, SOLUTION INTRAVENOUS at 18:10

## 2024-07-13 NOTE — CHART NOTE - NSCHARTNOTEFT_GEN_A_CORE
EVENT:  received critical value 7.1/19.4      HPI:  44 year old male from home, ambulates independently, with PMH of gout, antritis and sickle cell diease with chronic anemia and baseline Hb of 6 who c/o generalized body pain and weakness, chest pain and exertional shortness of breath. Started 2 days ago and was reports he was here last month for the same condition. Admits to lower back pain, rib pain, right hip pain that is 9/10.  He also endorses shortness of breath with minimal exertion, such as taking a couple of steps for the past 2 days. The chest pain is intermittent and midsternal and has resolved for now.  Admit to worsening chronic icterus and darking urine, some loose stools, HA, and lightheadedness. Denies any fever, chills, abdominal pain, or dysuria.       Of note, patient has had reaction to blood transfusion in the past and requires premedication with Tylenol and Benadryl.   He was here in May for similar pain with symptomatic anemia.  (10 Jul 2024 09:49)        OBJECTIVE:  Vital Signs Last 24 Hrs  T(C): 37 (13 Jul 2024 05:13), Max: 37 (13 Jul 2024 05:13)  T(F): 98.6 (13 Jul 2024 05:13), Max: 98.6 (13 Jul 2024 05:13)  HR: 97 (13 Jul 2024 05:13) (83 - 98)  BP: 155/89 (13 Jul 2024 05:13) (135/78 - 155/89)  BP(mean): --  RR: 16 (13 Jul 2024 05:13) (16 - 18)  SpO2: 97% (13 Jul 2024 05:13) (91% - 97%)    Parameters below as of 13 Jul 2024 05:13  Patient On (Oxygen Delivery Method): room air        LABS:                        7.1    11.07 )-----------( 192      ( 13 Jul 2024 06:50 )             19.4     07-13    136  |  108  |  22<H>  ----------------------------<  106<H>  4.3   |  24  |  0.95    Ca    8.8      13 Jul 2024 06:50    TPro  7.1  /  Alb  2.7<L>  /  TBili  13.5<H>  /  DBili  x   /  AST  178<H>  /  ALT  66<H>  /  AlkPhos  123<H>  07-13            PLAN:   1. Baseline hemoglobin around 5-6  2. monitor for overt bleeding, monitor cbc  3. f/u heme/onc reccs    FOLLOW UP/RESULTS:    1. f/u heme/onc reccs

## 2024-07-14 VITALS
RESPIRATION RATE: 16 BRPM | SYSTOLIC BLOOD PRESSURE: 152 MMHG | HEART RATE: 73 BPM | DIASTOLIC BLOOD PRESSURE: 86 MMHG | TEMPERATURE: 98 F

## 2024-07-14 LAB
ALBUMIN SERPL ELPH-MCNC: 2.7 G/DL — LOW (ref 3.5–5)
ALP SERPL-CCNC: 120 U/L — SIGNIFICANT CHANGE UP (ref 40–120)
ALT FLD-CCNC: 59 U/L DA — SIGNIFICANT CHANGE UP (ref 10–60)
ANION GAP SERPL CALC-SCNC: 7 MMOL/L — SIGNIFICANT CHANGE UP (ref 5–17)
AST SERPL-CCNC: 154 U/L — HIGH (ref 10–40)
BASOPHILS # BLD AUTO: 0.13 K/UL — SIGNIFICANT CHANGE UP (ref 0–0.2)
BASOPHILS NFR BLD AUTO: 1.3 % — SIGNIFICANT CHANGE UP (ref 0–2)
BILIRUB SERPL-MCNC: 12.2 MG/DL — HIGH (ref 0.2–1.2)
BLD GP AB SCN SERPL QL: SIGNIFICANT CHANGE UP
BUN SERPL-MCNC: 23 MG/DL — HIGH (ref 7–18)
CALCIUM SERPL-MCNC: 8.8 MG/DL — SIGNIFICANT CHANGE UP (ref 8.4–10.5)
CHLORIDE SERPL-SCNC: 109 MMOL/L — HIGH (ref 96–108)
CO2 SERPL-SCNC: 22 MMOL/L — SIGNIFICANT CHANGE UP (ref 22–31)
CREAT SERPL-MCNC: 0.89 MG/DL — SIGNIFICANT CHANGE UP (ref 0.5–1.3)
EGFR: 108 ML/MIN/1.73M2 — SIGNIFICANT CHANGE UP
EOSINOPHIL # BLD AUTO: 0.22 K/UL — SIGNIFICANT CHANGE UP (ref 0–0.5)
EOSINOPHIL NFR BLD AUTO: 2.1 % — SIGNIFICANT CHANGE UP (ref 0–6)
GLUCOSE SERPL-MCNC: 91 MG/DL — SIGNIFICANT CHANGE UP (ref 70–99)
HCT VFR BLD CALC: 19.4 % — CRITICAL LOW (ref 39–50)
HGB BLD-MCNC: 6.9 G/DL — CRITICAL LOW (ref 13–17)
IMM GRANULOCYTES NFR BLD AUTO: 0.5 % — SIGNIFICANT CHANGE UP (ref 0–0.9)
LYMPHOCYTES # BLD AUTO: 2.94 K/UL — SIGNIFICANT CHANGE UP (ref 1–3.3)
LYMPHOCYTES # BLD AUTO: 28.6 % — SIGNIFICANT CHANGE UP (ref 13–44)
MCHC RBC-ENTMCNC: 29 PG — SIGNIFICANT CHANGE UP (ref 27–34)
MCHC RBC-ENTMCNC: 35.6 GM/DL — SIGNIFICANT CHANGE UP (ref 32–36)
MCV RBC AUTO: 81.5 FL — SIGNIFICANT CHANGE UP (ref 80–100)
MONOCYTES # BLD AUTO: 1.28 K/UL — HIGH (ref 0–0.9)
MONOCYTES NFR BLD AUTO: 12.5 % — SIGNIFICANT CHANGE UP (ref 2–14)
NEUTROPHILS # BLD AUTO: 5.65 K/UL — SIGNIFICANT CHANGE UP (ref 1.8–7.4)
NEUTROPHILS NFR BLD AUTO: 55 % — SIGNIFICANT CHANGE UP (ref 43–77)
NRBC # BLD: 0 /100 WBCS — SIGNIFICANT CHANGE UP (ref 0–0)
PLATELET # BLD AUTO: 227 K/UL — SIGNIFICANT CHANGE UP (ref 150–400)
POTASSIUM SERPL-MCNC: 4.1 MMOL/L — SIGNIFICANT CHANGE UP (ref 3.5–5.3)
POTASSIUM SERPL-SCNC: 4.1 MMOL/L — SIGNIFICANT CHANGE UP (ref 3.5–5.3)
PROT SERPL-MCNC: 7.2 G/DL — SIGNIFICANT CHANGE UP (ref 6–8.3)
RBC # BLD: 2.38 M/UL — LOW (ref 4.2–5.8)
RBC # BLD: 2.38 M/UL — LOW (ref 4.2–5.8)
RBC # FLD: 22.1 % — HIGH (ref 10.3–14.5)
RETICS #: 189.6 K/UL — HIGH (ref 25–125)
RETICS/RBC NFR: 8.1 % — HIGH (ref 0.5–2.5)
SODIUM SERPL-SCNC: 138 MMOL/L — SIGNIFICANT CHANGE UP (ref 135–145)
WBC # BLD: 10.27 K/UL — SIGNIFICANT CHANGE UP (ref 3.8–10.5)
WBC # FLD AUTO: 10.27 K/UL — SIGNIFICANT CHANGE UP (ref 3.8–10.5)

## 2024-07-14 PROCEDURE — 86922 COMPATIBILITY TEST ANTIGLOB: CPT

## 2024-07-14 PROCEDURE — P9040: CPT

## 2024-07-14 PROCEDURE — 83880 ASSAY OF NATRIURETIC PEPTIDE: CPT

## 2024-07-14 PROCEDURE — 99285 EMERGENCY DEPT VISIT HI MDM: CPT | Mod: 25

## 2024-07-14 PROCEDURE — 93970 EXTREMITY STUDY: CPT

## 2024-07-14 PROCEDURE — 86901 BLOOD TYPING SEROLOGIC RH(D): CPT

## 2024-07-14 PROCEDURE — 82247 BILIRUBIN TOTAL: CPT

## 2024-07-14 PROCEDURE — 86900 BLOOD TYPING SEROLOGIC ABO: CPT

## 2024-07-14 PROCEDURE — 72170 X-RAY EXAM OF PELVIS: CPT

## 2024-07-14 PROCEDURE — 93306 TTE W/DOPPLER COMPLETE: CPT

## 2024-07-14 PROCEDURE — 71046 X-RAY EXAM CHEST 2 VIEWS: CPT

## 2024-07-14 PROCEDURE — 99239 HOSP IP/OBS DSCHRG MGMT >30: CPT

## 2024-07-14 PROCEDURE — 86850 RBC ANTIBODY SCREEN: CPT

## 2024-07-14 PROCEDURE — 80048 BASIC METABOLIC PNL TOTAL CA: CPT

## 2024-07-14 PROCEDURE — 85730 THROMBOPLASTIN TIME PARTIAL: CPT

## 2024-07-14 PROCEDURE — 85045 AUTOMATED RETICULOCYTE COUNT: CPT

## 2024-07-14 PROCEDURE — 81001 URINALYSIS AUTO W/SCOPE: CPT

## 2024-07-14 PROCEDURE — 36430 TRANSFUSION BLD/BLD COMPNT: CPT

## 2024-07-14 PROCEDURE — 85027 COMPLETE CBC AUTOMATED: CPT

## 2024-07-14 PROCEDURE — 80053 COMPREHEN METABOLIC PANEL: CPT

## 2024-07-14 PROCEDURE — 71045 X-RAY EXAM CHEST 1 VIEW: CPT

## 2024-07-14 PROCEDURE — 84484 ASSAY OF TROPONIN QUANT: CPT

## 2024-07-14 PROCEDURE — 85610 PROTHROMBIN TIME: CPT

## 2024-07-14 PROCEDURE — 96375 TX/PRO/DX INJ NEW DRUG ADDON: CPT

## 2024-07-14 PROCEDURE — 83615 LACTATE (LD) (LDH) ENZYME: CPT

## 2024-07-14 PROCEDURE — 93005 ELECTROCARDIOGRAM TRACING: CPT

## 2024-07-14 PROCEDURE — 36415 COLL VENOUS BLD VENIPUNCTURE: CPT

## 2024-07-14 PROCEDURE — 96374 THER/PROPH/DIAG INJ IV PUSH: CPT

## 2024-07-14 PROCEDURE — 82248 BILIRUBIN DIRECT: CPT

## 2024-07-14 PROCEDURE — 96376 TX/PRO/DX INJ SAME DRUG ADON: CPT

## 2024-07-14 PROCEDURE — 85025 COMPLETE CBC W/AUTO DIFF WBC: CPT

## 2024-07-14 RX ORDER — HEPARIN SODIUM,PORCINE/PF 10 UNIT/ML
100 SYRINGE (ML) INTRAVENOUS ONCE
Refills: 0 | Status: COMPLETED | OUTPATIENT
Start: 2024-07-14 | End: 2024-07-14

## 2024-07-14 RX ORDER — DIPHENHYDRAMINE HCL 12.5MG/5ML
50 ELIXIR ORAL ONCE
Refills: 0 | Status: COMPLETED | OUTPATIENT
Start: 2024-07-14 | End: 2024-07-14

## 2024-07-14 RX ORDER — ACETAMINOPHEN 325 MG
2 TABLET ORAL
Qty: 0 | Refills: 0 | DISCHARGE
Start: 2024-07-14

## 2024-07-14 RX ADMIN — HYDROMORPHONE HCL 2 MILLIGRAM(S): 0.2 INJECTION, SOLUTION INTRAVENOUS at 03:59

## 2024-07-14 RX ADMIN — HYDROMORPHONE HCL 2 MILLIGRAM(S): 0.2 INJECTION, SOLUTION INTRAVENOUS at 03:28

## 2024-07-14 RX ADMIN — ALLOPURINOL 200 MILLIGRAM(S): 300 TABLET ORAL at 11:02

## 2024-07-14 RX ADMIN — Medication 650 MILLIGRAM(S): at 17:27

## 2024-07-14 RX ADMIN — HYDROMORPHONE HCL 2 MILLIGRAM(S): 0.2 INJECTION, SOLUTION INTRAVENOUS at 10:07

## 2024-07-14 RX ADMIN — HYDROMORPHONE HCL 2 MILLIGRAM(S): 0.2 INJECTION, SOLUTION INTRAVENOUS at 09:37

## 2024-07-14 RX ADMIN — MUPIROCIN 1 APPLICATION(S): 20 OINTMENT TOPICAL at 17:26

## 2024-07-14 RX ADMIN — Medication 50 MILLIGRAM(S): at 14:35

## 2024-07-14 RX ADMIN — Medication 100 UNIT(S): at 18:47

## 2024-07-14 RX ADMIN — HYDROMORPHONE HCL 2 MILLIGRAM(S): 0.2 INJECTION, SOLUTION INTRAVENOUS at 07:40

## 2024-07-14 RX ADMIN — HYDROMORPHONE HCL 2 MILLIGRAM(S): 0.2 INJECTION, SOLUTION INTRAVENOUS at 00:40

## 2024-07-14 RX ADMIN — HYDROMORPHONE HCL 2 MILLIGRAM(S): 0.2 INJECTION, SOLUTION INTRAVENOUS at 15:45

## 2024-07-14 RX ADMIN — HYDROMORPHONE HCL 2 MILLIGRAM(S): 0.2 INJECTION, SOLUTION INTRAVENOUS at 12:41

## 2024-07-14 RX ADMIN — HYDROMORPHONE HCL 2 MILLIGRAM(S): 0.2 INJECTION, SOLUTION INTRAVENOUS at 06:34

## 2024-07-14 RX ADMIN — Medication 650 MILLIGRAM(S): at 14:36

## 2024-07-14 RX ADMIN — MUPIROCIN 1 APPLICATION(S): 20 OINTMENT TOPICAL at 05:05

## 2024-07-14 RX ADMIN — Medication 1 APPLICATION(S): at 05:05

## 2024-07-14 RX ADMIN — HYDROMORPHONE HCL 2 MILLIGRAM(S): 0.2 INJECTION, SOLUTION INTRAVENOUS at 16:26

## 2024-07-14 RX ADMIN — HYDROMORPHONE HCL 2 MILLIGRAM(S): 0.2 INJECTION, SOLUTION INTRAVENOUS at 15:41

## 2024-07-14 RX ADMIN — HYDROMORPHONE HCL 2 MILLIGRAM(S): 0.2 INJECTION, SOLUTION INTRAVENOUS at 00:10

## 2024-07-14 NOTE — PROGRESS NOTE ADULT - REASON FOR ADMISSION
sickle cell crises and acute chest syndrome.

## 2024-07-14 NOTE — CHART NOTE - NSCHARTNOTEFT_GEN_A_CORE
EVENT:  reported by RN critical h/h of 6.9/19.4      HPI:  44 year old male from home, ambulates independently, with PMH of gout, antritis and sickle cell diease with chronic anemia and baseline Hb of 6 who c/o generalized body pain and weakness, chest pain and exertional shortness of breath. Started 2 days ago and was reports he was here last month for the same condition. Admits to lower back pain, rib pain, right hip pain that is 9/10.  He also endorses shortness of breath with minimal exertion, such as taking a couple of steps for the past 2 days. The chest pain is intermittent and midsternal and has resolved for now.  Admit to worsening chronic icterus and darking urine, some loose stools, HA, and lightheadedness. Denies any fever, chills, abdominal pain, or dysuria.       Of note, patient has had reaction to blood transfusion in the past and requires premedication with Tylenol and Benadryl.   He was here in May for similar pain with symptomatic anemia.  (10 Jul 2024 09:49)        OBJECTIVE:  Vital Signs Last 24 Hrs  T(C): 36.8 (14 Jul 2024 05:11), Max: 37.2 (13 Jul 2024 14:01)  T(F): 98.3 (14 Jul 2024 05:11), Max: 99 (13 Jul 2024 14:01)  HR: 82 (14 Jul 2024 05:11) (82 - 96)  BP: 132/76 (14 Jul 2024 05:11) (132/76 - 152/87)  BP(mean): --  RR: 17 (14 Jul 2024 05:11) (16 - 17)  SpO2: 100% (14 Jul 2024 05:11) (90% - 100%)    Parameters below as of 14 Jul 2024 05:11  Patient On (Oxygen Delivery Method): room air        LABS:                        6.9    10.27 )-----------( 227      ( 14 Jul 2024 06:40 )             19.4     07-14    138  |  109<H>  |  23<H>  ----------------------------<  91  4.1   |  22  |  0.89    Ca    8.8      14 Jul 2024 06:40    TPro  7.2  /  Alb  2.7<L>  /  TBili  12.2<H>  /  DBili  x   /  AST  154<H>  /  ALT  59  /  AlkPhos  120  07-14        ASSESSMENT:  1. VSS, no acute distress noted.    PLAN:   1. monitor pt. and f/u heme/onc reccs

## 2024-07-14 NOTE — PROGRESS NOTE ADULT - ASSESSMENT
TOLU VARGAS is a 43y Male who presents with a chief complaint of anemia    Sickle Cell Disease  ·	Patient follows with Dr. Shirley Jordan  ·	Baseline hemoglobin around 5-6 at times.   ·	hgb adequate, improved after total of 2U PRBC this admission  ·	hold off on PRBC transfusion today, WBC and tbili improving, suspect VOC improving as well. Try to minimize transfusions  ·	restart home meds upon d/c  ·	if need transfusions in the future, pt needs benadryl 25mg IV and tylenol PO premed per usual protocol, per pt  ·	pls check CXR given acute hypoxia, last CXR on 7/10 neg  ·	cont pain control, seems adequate at this time    UA pos for blood, no RBC counted, may be related to hemolysis, no urinary / UTI sx at this time  -- send for UCx if concern    fever -- on 7/11, afebrile today  -- dopplers neg for DVT on 7/11 b/l LE  -- noted to have positive blood cultures from the port.  Probably contaminant given patient is asymptomatic without fevers. Repeat peripheral was done, f/u cx results  -- consider ID consult if new issues    d/w pt, will follow, consider d/c plannign if he cont to improve  
TOLU VARGAS is a 43y Male who presents with a chief complaint of anemia    Sickle Cell Disease  ·	Patient follows with Dr. Shirley Jordan  ·	Baseline hemoglobin around 5-6, though hgb higher last few days after PRBC, hgb lower today with acute rise in bili, O2 requirement, c/w VOC today  ·	give 1U PRBC today with benadryl 25mg IV and tylenol PO premed per usual protocol, per pt  ·	no significant sx for ACS but low threshold to consult MICU if with sx of fever, worsened SOB, CP  ·	pls check CXR given acute hypoxia, last CXR on 7/10 neg  ·	if with CXR findings, along with low grade temp yest, would start empiric abx including coverage for atypical organisms in a sickle cell pt, such as azithro  ·	low threshold to consult pulm and ID if with changes  ·	cont pain control, seems adequate at this time    UA pos for blood, no RBC counted, may be related to hemolysis, no urinary / UTI sx at this time  -- send for UCx if concern    fever -- on 7/11, afebrile today  -- dopplers neg for DVT on 7/11 b/l LE  -- noted to have positive blood cultures from the port.  Probably contaminant given patient is asymptomatic without fevers. Repeat peripheral was done, f/u cx results  -- consider ID consult    d/w pt and primary team, will follow  
#Acute pain syndrome 2/2 sickle cell crisis   #Sickle cell anemia   #MATA, resolved  #Hyperbilirubinemia   #Gout  #DVT ppx     44yM with PMH of gout and sickle cell anemia, with multiple admissions for sickle cell crisis, who presented with acute onset abdominal and back pain. Admitted for further management.       -Monitor Hgb, 7.1 today, at baseline  -Avoid IVF/excessive transfusions to prevent volume overload   -Oxygen saturation 90-92% on room air, though patient states he is feeling short of breath with ambulation  -Pain control   -CXR with no acute findings; repeat CXR ordered today - will start antibiotics if any abnormal findings  -XR pelvis unremarkable  -US Doppler b/l LE negative for DVT   -No further febrile episodes, cultures not obtained  -Heme/onc following, will continue oxbryta and hold jadenu as recommended; discussed with Dr. Perez  -TTE showed mildly dilated LA, EF 59%, G1DD, mild pulm HTN  -Pain mgmt following  -DVT ppx
43 year old male from home, ambulates independently, with PMH of gout and sickle cell diease with chronic anemia and baseline Hb of 6 who presented to the ED generalized pain. He has had more frequent sickle cell crisis from yearly to every 2-3 months. Was transitioned to Oxbryta (received shipement on Tuesday but did not start yet). Sent from Dr. Jordan's office for worsening pain and low Hgb  Patient with leukocytosis to 14K and H/H 5.6/15.5   Patient also with MATA of 1.51 (baseline <1) and elevated liver enzymes which appear stable since prior admissions.   Pt admitted for symptomatic anemia and sickle cell crisis. Hgb 5.6, ordered 2 units PRBC. Pain management and heme/onc consulted.   
43 year old male from home, ambulates independently, with PMH of gout and sickle cell diease with chronic anemia and baseline Hb of 6 who presented to the ED generalized pain. He has had more frequent sickle cell crisis from yearly to every 2-3 months. Was transitioned to Oxbryta (received shipement on Tuesday but did not start yet). Sent from Dr. Jordan's office for worsening pain and low Hgb  Patient with leukocytosis to 14K and H/H 5.6/15.5   Patient also with MATA of 1.51 (baseline <1) and elevated liver enzymes which appear stable since prior admissions.   Pt admitted for symptomatic anemia and sickle cell crisis. Hgb 5.6, ordered 2 units PRBC. Pain management and heme/onc consulted.

## 2024-07-14 NOTE — PROGRESS NOTE ADULT - SUBJECTIVE AND OBJECTIVE BOX
Pt seen, with low grade temp early yest but none since then. Feeling more SOB this am, pox 92% on RA, he was put on 2L NC but took it off this am, wanted to see if he could wean himself down. He denies CP, no severe SOB, able to speak in full sentences    MEDICATIONS  (STANDING):  allopurinol 200 milliGRAM(s) Oral daily  chlorhexidine 2% Cloths 1 Application(s) Topical <User Schedule>  diphenhydrAMINE Injectable 50 milliGRAM(s) IV Push once  heparin   Injectable 5000 Unit(s) SubCutaneous every 8 hours  lactated ringers. 1000 milliLiter(s) (75 mL/Hr) IV Continuous <Continuous>  mupirocin 2% Ointment 1 Application(s) Both Nostrils two times a day    MEDICATIONS  (PRN):  acetaminophen     Tablet .. 650 milliGRAM(s) Oral every 6 hours PRN Temp greater or equal to 38C (100.4F), Mild Pain (1 - 3)  HYDROmorphone  Injectable 2 milliGRAM(s) IV Push every 3 hours PRN Severe Pain (7 - 10)      ROS  No fever, sweats, chills  No epistaxis, HA, sore throat  No CP, cough, sputum  No n/v/d, abd pain, melena, hematochezia  No edema  No rash  No anxiety  + back pain, joint pain  No bleeding, bruising  No dysuria, hematuria    Vital Signs Last 24 Hrs  T(C): 37.3 (12 Jul 2024 05:39), Max: 37.3 (12 Jul 2024 05:39)  T(F): 99.2 (12 Jul 2024 05:39), Max: 99.2 (12 Jul 2024 05:39)  HR: 108 (12 Jul 2024 05:39) (96 - 108)  BP: 145/70 (12 Jul 2024 05:39) (121/77 - 148/83)  BP(mean): --  RR: 18 (12 Jul 2024 05:39) (16 - 18)  SpO2: 92% (12 Jul 2024 05:39) (91% - 96%)    Parameters below as of 12 Jul 2024 05:39  Patient On (Oxygen Delivery Method): nasal cannula  O2 Flow (L/min): 2      PE  NAD  no acute resp distress  significant icterus, jaundice  full exam deferred                          6.6    10.89 )-----------( 212      ( 12 Jul 2024 05:50 )             18.1       07-12    135  |  108  |  24<H>  ----------------------------<  114<H>  4.5   |  22  |  1.16    Ca    8.8      12 Jul 2024 05:50    TPro  7.3  /  Alb  2.9<L>  /  TBili  15.1<H>  /  DBili  x   /  AST  216<H>  /  ALT  71<H>  /  AlkPhos  129<H>  07-12      
S: Patient seen and examined at bedside. No acute events overnight. Resting comfortably in bed, however, states that he becomes acutely short of breath with ambulation.     REVIEW OF SYSTEMS:  CONSTITUTIONAL: No weakness, fevers or chills  EYES: No visual changes  ENT: No vertigo or throat pain   NECK: No pain or stiffness  RESPIRATORY: No cough, wheezing, hemoptysis; No shortness of breath  CARDIOVASCULAR: No chest pain or palpitations  GASTROINTESTINAL: No abdominal or epigastric pain. No nausea, vomiting, or hematemesis; No diarrhea or constipation. No melena or hematochezia.  GENITOURINARY: No dysuria, frequency or hematuria  NEUROLOGICAL: No numbness or weakness  SKIN: No itching, rashes  PSYCH: No depression, anxiety    O:  Vital Signs Last 24 Hrs  T(C): 37.2 (13 Jul 2024 14:01), Max: 37.2 (13 Jul 2024 14:01)  T(F): 99 (13 Jul 2024 14:01), Max: 99 (13 Jul 2024 14:01)  HR: 96 (13 Jul 2024 14:01) (89 - 97)  BP: 152/87 (13 Jul 2024 14:01) (148/89 - 155/89)  BP(mean): --  RR: 16 (13 Jul 2024 14:20) (16 - 16)  SpO2: 90% (13 Jul 2024 14:20) (90% - 98%)    Parameters below as of 13 Jul 2024 14:20  Patient On (Oxygen Delivery Method): room air, walking     Constitutional/General: Well developed, vitals reviewed  EYE: Icteric pupils, conjunctiva clear   ENT: Good dentition, oropharynx clear  NECK: No visual masses, no JVD  CHEST: No respiratory distress, bilateral symmetrical chest rise, R-chest wall port   ABDOMEN: Nondistended, no visual masses  SKIN: No rash, warm, dry, jaundice  NEURO: A+Ox3, Cranial nerves grossly intact, moves all extremities, follows commands  PSYCH: Normal mood, normal affect    acetaminophen     Tablet .. 650 milliGRAM(s) Oral every 6 hours PRN  allopurinol 200 milliGRAM(s) Oral daily  chlorhexidine 2% Cloths 1 Application(s) Topical <User Schedule>  heparin   Injectable 5000 Unit(s) SubCutaneous every 8 hours  HYDROmorphone  Injectable 2 milliGRAM(s) IV Push every 3 hours PRN  lactated ringers. 1000 milliLiter(s) IV Continuous <Continuous>  mupirocin 2% Ointment 1 Application(s) Both Nostrils two times a day                            7.1    11.07 )-----------( 192      ( 13 Jul 2024 06:50 )             19.4       07-13    136  |  108  |  22<H>  ----------------------------<  106<H>  4.3   |  24  |  0.95    Ca    8.8      13 Jul 2024 06:50    TPro  7.1  /  Alb  2.7<L>  /  TBili  13.5<H>  /  DBili  x   /  AST  178<H>  /  ALT  66<H>  /  AlkPhos  123<H>  07-13      
Pt seen, resting comfortably, no SOB/CP, feeling better    MEDICATIONS  (STANDING):  allopurinol 200 milliGRAM(s) Oral daily  chlorhexidine 2% Cloths 1 Application(s) Topical <User Schedule>  heparin   Injectable 5000 Unit(s) SubCutaneous every 8 hours  lactated ringers. 1000 milliLiter(s) (75 mL/Hr) IV Continuous <Continuous>  mupirocin 2% Ointment 1 Application(s) Both Nostrils two times a day    MEDICATIONS  (PRN):  acetaminophen     Tablet .. 650 milliGRAM(s) Oral every 6 hours PRN Temp greater or equal to 38C (100.4F), Mild Pain (1 - 3)  HYDROmorphone  Injectable 2 milliGRAM(s) IV Push every 3 hours PRN Severe Pain (7 - 10)      ROS  No fever, sweats, chills  No epistaxis, HA, sore throat  No CP, SOB, cough, sputum  No n/v/d, abd pain, melena, hematochezia  No edema  No rash  No anxiety  No bleeding, bruising  No dysuria, hematuria    Vital Signs Last 24 Hrs  T(C): 36.9 (14 Jul 2024 14:35), Max: 36.9 (14 Jul 2024 14:18)  T(F): 98.5 (14 Jul 2024 14:35), Max: 98.5 (14 Jul 2024 14:18)  HR: 81 (14 Jul 2024 14:35) (81 - 100)  BP: 147/83 (14 Jul 2024 14:35) (132/76 - 151/72)  BP(mean): --  RR: 16 (14 Jul 2024 14:35) (16 - 17)  SpO2: 93% (14 Jul 2024 14:35) (92% - 100%)    Parameters below as of 14 Jul 2024 14:35  Patient On (Oxygen Delivery Method): room air        PE  NAD  Awake, alert  icteric and jaundiced  full exam deferred today                          6.9    10.27 )-----------( 227      ( 14 Jul 2024 06:40 )             19.4       07-14    138  |  109<H>  |  23<H>  ----------------------------<  91  4.1   |  22  |  0.89    Ca    8.8      14 Jul 2024 06:40    TPro  7.2  /  Alb  2.7<L>  /  TBili  12.2<H>  /  DBili  x   /  AST  154<H>  /  ALT  59  /  AlkPhos  120  07-14      
NP Note discussed with  primary attending    Patient is a 44y old  Male who presents with a chief complaint of sickle cell crises and acute chest syndrome. (2024 10:54)      INTERVAL HPI/OVERNIGHT EVENTS: no new complaints    MEDICATIONS  (STANDING):  chlorhexidine 2% Cloths 1 Application(s) Topical <User Schedule>  heparin   Injectable 5000 Unit(s) SubCutaneous every 8 hours  lactated ringers. 1000 milliLiter(s) (75 mL/Hr) IV Continuous <Continuous>  mupirocin 2% Ointment 1 Application(s) Both Nostrils two times a day    MEDICATIONS  (PRN):  acetaminophen     Tablet .. 650 milliGRAM(s) Oral every 6 hours PRN Temp greater or equal to 38C (100.4F), Mild Pain (1 - 3)  HYDROmorphone  Injectable 2 milliGRAM(s) IV Push every 3 hours PRN Severe Pain (7 - 10)      __________________________________________________  REVIEW OF SYSTEMS:    CONSTITUTIONAL: No fever,   EYES: no acute visual disturbances  NECK: No pain or stiffness  RESPIRATORY: No cough; No shortness of breath  CARDIOVASCULAR: No chest pain, no palpitations  GASTROINTESTINAL: No pain. No nausea or vomiting; No diarrhea   NEUROLOGICAL: No headache or numbness, no tremors  MUSCULOSKELETAL: No joint pain, no muscle pain  GENITOURINARY: no dysuria, no frequency, no hesitancy  PSYCHIATRY: no depression , no anxiety  ALL OTHER  ROS negative        Vital Signs Last 24 Hrs  T(C): 37.6 (2024 07:03), Max: 37.9 (2024 05:15)  T(F): 99.7 (2024 07:03), Max: 100.3 (2024 05:15)  HR: 115 (2024 05:15) (102 - 115)  BP: 151/89 (2024 05:15) (151/89 - 160/80)  BP(mean): --  RR: 17 (2024 05:15) (16 - 18)  SpO2: 96% (2024 05:15) (93% - 96%)    Parameters below as of 2024 05:15  Patient On (Oxygen Delivery Method): room air        ________________________________________________  PHYSICAL EXAM:  GENERAL: NAD  HEENT: Normocephalic;  conjunctivae and sclerae clear; moist mucous membranes;   NECK : supple  CHEST/LUNG: Clear to ausculitation bilaterally with good air entry   HEART: S1 S2  regular; no murmurs, gallops or rubs  ABDOMEN: Soft, Nontender, Nondistended; Bowel sounds present  EXTREMITIES: Noam LE +2 edema, no cyanosis, warm to touch  SKIN: warm and dry; no rash  NERVOUS SYSTEM:  Awake and alert; Oriented  to place, person and time ; no new deficits    _________________________________________________  LABS:                        7.5    8.86  )-----------( 247      ( 10 Jul 2024 22:35 )             20.4     07-10    135  |  108  |  20<H>  ----------------------------<  103<H>  4.4   |  22  |  1.16    Ca    8.7      10 Jul 2024 12:57    TPro  x   /  Alb  x   /  TBili  11.3<H>  /  DBili  6.2<H>  /  AST  x   /  ALT  x   /  AlkPhos  x   07-10    PT/INR - ( 10 Jul 2024 22:35 )   PT: 12.7 sec;   INR: 1.12 ratio         PTT - ( 10 Jul 2024 22:35 )  PTT:24.8 sec  Urinalysis Basic - ( 2024 09:29 )    Color: Orange / Appearance: Clear / S.012 / pH: x  Gluc: x / Ketone: Negative mg/dL  / Bili: Moderate / Urobili: 1.0 mg/dL   Blood: x / Protein: 300 mg/dL / Nitrite: Positive   Leuk Esterase: Trace / RBC: 2 /HPF / WBC 1 /HPF   Sq Epi: x / Non Sq Epi: x / Bacteria: None Seen /HPF      CAPILLARY BLOOD GLUCOSE            RADIOLOGY & ADDITIONAL TESTS:  < from: US Duplex Venous Lower Ext Complete, Bilateral (24 @ 11:08) >  IMPRESSION:  No evidence of deep venous thrombosis in either lower extremity.    < from: Xray Chest 2 Views PA/Lat (07.10.24 @ 01:51) >  IMPRESSION:  No radiographic evidence of active chest disease..      < end of copied text >      < end of copied text >    Imaging Personally Reviewed:  YES    Consultant(s) Notes Reviewed:   YES    Care Discussed with Consultants :     Plan of care was discussed with patient and /or primary care giver; all questions and concerns were addressed and care was aligned with patient's wishes.    
NP Note discussed with  primary attending    Patient is a 44y old  Male who presents with a chief complaint of sickle cell crises and acute chest syndrome. (12 Jul 2024 10:52)      INTERVAL HPI/OVERNIGHT EVENTS: no new complaints    MEDICATIONS  (STANDING):  allopurinol 200 milliGRAM(s) Oral daily  chlorhexidine 2% Cloths 1 Application(s) Topical <User Schedule>  heparin   Injectable 5000 Unit(s) SubCutaneous every 8 hours  lactated ringers. 1000 milliLiter(s) (75 mL/Hr) IV Continuous <Continuous>  mupirocin 2% Ointment 1 Application(s) Both Nostrils two times a day    MEDICATIONS  (PRN):  acetaminophen     Tablet .. 650 milliGRAM(s) Oral every 6 hours PRN Temp greater or equal to 38C (100.4F), Mild Pain (1 - 3)  HYDROmorphone  Injectable 2 milliGRAM(s) IV Push every 3 hours PRN Severe Pain (7 - 10)      __________________________________________________  REVIEW OF SYSTEMS:    CONSTITUTIONAL: No fever,   EYES: no acute visual disturbances  NECK: No pain or stiffness  RESPIRATORY: No cough; No shortness of breath  CARDIOVASCULAR: No chest pain, no palpitations  GASTROINTESTINAL: No pain. No nausea or vomiting; No diarrhea   NEUROLOGICAL: No headache or numbness, no tremors  MUSCULOSKELETAL: No joint pain, no muscle pain  GENITOURINARY: no dysuria, no frequency, no hesitancy  PSYCHIATRY: no depression , no anxiety  ALL OTHER  ROS negative        Vital Signs Last 24 Hrs  T(C): 36.7 (12 Jul 2024 12:04), Max: 37.3 (12 Jul 2024 05:39)  T(F): 98.1 (12 Jul 2024 12:04), Max: 99.2 (12 Jul 2024 05:39)  HR: 98 (12 Jul 2024 12:04) (88 - 108)  BP: 135/78 (12 Jul 2024 12:04) (121/77 - 148/83)  BP(mean): --  RR: 16 (12 Jul 2024 12:04) (16 - 18)  SpO2: 93% (12 Jul 2024 12:04) (91% - 96%)    Parameters below as of 12 Jul 2024 12:04  Patient On (Oxygen Delivery Method): nasal cannula  O2 Flow (L/min): 2      ________________________________________________  PHYSICAL EXAM:  GENERAL: NAD  HEENT: Normocephalic;  conjunctivae and sclerae clear; moist mucous membranes;   NECK : supple  CHEST/LUNG: Clear to ausculitation bilaterally with good air entry   HEART: S1 S2  regular; no murmurs, gallops or rubs  ABDOMEN: Soft, Nontender, Nondistended; Bowel sounds present  EXTREMITIES: no cyanosis; no edema; no calf tenderness  SKIN: warm and dry; no rash  NERVOUS SYSTEM:  Awake and alert; Oriented  to place, person and time ; no new deficits    _________________________________________________  LABS:                        6.6    10.89 )-----------( 212      ( 12 Jul 2024 05:50 )             18.1     07-12    135  |  108  |  24<H>  ----------------------------<  114<H>  4.5   |  22  |  1.16    Ca    8.8      12 Jul 2024 05:50    TPro  7.3  /  Alb  2.9<L>  /  TBili  15.1<H>  /  DBili  x   /  AST  216<H>  /  ALT  71<H>  /  AlkPhos  129<H>  07-12    PT/INR - ( 10 Jul 2024 22:35 )   PT: 12.7 sec;   INR: 1.12 ratio         PTT - ( 10 Jul 2024 22:35 )  PTT:24.8 sec  Urinalysis Basic - ( 12 Jul 2024 05:50 )    Color: x / Appearance: x / SG: x / pH: x  Gluc: 114 mg/dL / Ketone: x  / Bili: x / Urobili: x   Blood: x / Protein: x / Nitrite: x   Leuk Esterase: x / RBC: x / WBC x   Sq Epi: x / Non Sq Epi: x / Bacteria: x      CAPILLARY BLOOD GLUCOSE            RADIOLOGY & ADDITIONAL TESTS:  < from: US Duplex Venous Lower Ext Complete, Bilateral (07.11.24 @ 11:08) >  IMPRESSION:  No evidence of deep venous thrombosis in either lower extremity.          < end of copied text >    Imaging Personally Reviewed:  YES    Consultant(s) Notes Reviewed:   YES    Care Discussed with Consultants :     Plan of care was discussed with patient and /or primary care giver; all questions and concerns were addressed and care was aligned with patient's wishes.

## 2024-07-14 NOTE — PROVIDER CONTACT NOTE (CRITICAL VALUE NOTIFICATION) - TEST AND RESULT REPORTED:
Hematocrit 20.4
Hematocrit 20.3
Bdong  HCT 19.4 , Hg 7.1
hg 6.6 hct 18.1
Hemoglobin- 6.9, Hematocrit- 19.4

## 2024-07-14 NOTE — DISCHARGE NOTE NURSING/CASE MANAGEMENT/SOCIAL WORK - PATIENT PORTAL LINK FT
You can access the FollowMyHealth Patient Portal offered by [facility] by registering at the following website: [email or web address]. By joining [facility]’s FollowMyHealth portal, you will also be able to view your health information using other applications (apps) compatible with our system.

## 2024-08-09 NOTE — DISCHARGE NOTE PROVIDER - HOSPITAL COURSE
"Well , 18 Months Old  Well-child exams are visits with a health care provider to track your child's growth and development at certain ages. The following information tells you what to expect during this visit and gives you some helpful tips about caring for your child.  What immunizations does my child need?  Hepatitis A vaccine.  Influenza vaccine (flu shot). A yearly (annual) flu shot is recommended.  Other vaccines may be suggested to catch up on any missed vaccines or if your child has certain high-risk conditions.  For more information about vaccines, talk to your child's health care provider or go to the Centers for Disease Control and Prevention website for immunization schedules: www.cdc.gov/vaccines/schedules  What tests does my child need?  Your child's health care provider:  Will complete a physical exam of your child.  Will measure your child's length, weight, and head size. The health care provider will compare the measurements to a growth chart to see how your child is growing.  Will screen your child for autism spectrum disorder (ASD).  May recommend checking blood pressure or screening for low red blood cell count (anemia), lead poisoning, or tuberculosis (TB). This depends on your child's risk factors.  Caring for your child  Parenting tips  Praise your child's good behavior by giving your child your attention.  Spend some one-on-one time with your child daily. Vary activities and keep activities short. Provide your child with choices throughout the day.  When giving your child instructions (not choices), avoid asking yes and no questions (\"Do you want a bath?\"). Instead, give clear instructions (\"Time for a bath.\").  Interrupt your child's inappropriate behavior and show your child what to do instead. You can also remove your child from the situation and move on to a more appropriate activity.  Avoid shouting at or spanking your child.  If your child cries to get what he or she wants, " Pt is a 41 yo M w/ PMHx sickle cell disease, gout and cholecystectomy who presents with knee and back pain x 2 days. Pt states he comes in every month for blood transfusions 2/2 to sickle cell disease. He states his pain is a 10/10 sharp pain that is in most of the joints in his body, specifically jaw, knee, mouth, and back. Patient states he is also dizzy, has a right sided headache, is nauseous, and has worsening lower extremity swelling. Otherwise, denies CP, SOB, Fever, chills, constipation, diarrhea, numbness/tingling, bleeding, visual disturbances, or fevers. Of note, he is up to date on his immunizations.    His labs revealed a Hgb of 4.8, retic percent 15.8, absolute retic 304 and .  Pt received one unit PRBC w/ repeat hgb 6.2 consistent with his baseline.  Pt was followed by heme-onc.     INCOMPLETE "wait until your child briefly calms down before giving him or her the item or activity. Also, model the words that your child should use. For example, say \"cookie, please\" or \"climb up.\"  Avoid situations or activities that may cause your child to have a temper tantrum, such as shopping trips.  Oral health    Brush your child's teeth after meals and before bedtime. Use a small amount of fluoride toothpaste.  Take your child to a dentist to discuss oral health.  Give fluoride supplements or apply fluoride varnish to your child's teeth as told by your child's health care provider.  Provide all beverages in a cup and not in a bottle. Doing this helps to prevent tooth decay.  If your child uses a pacifier, try to stop giving it your child when he or she is awake.  Sleep  At this age, children typically sleep 12 or more hours a day.  Your child may start taking one nap a day in the afternoon. Let your child's morning nap naturally fade from your child's routine.  Keep naptime and bedtime routines consistent.  Provide a separate sleep space for your child.  General instructions  Talk with your child's health care provider if you are worried about access to food or housing.  What's next?  Your next visit should take place when your child is 24 months old.  Summary  Your child may receive vaccines at this visit.  Your child's health care provider may recommend testing blood pressure or screening for anemia, lead poisoning, or tuberculosis (TB). This depends on your child's risk factors.  When giving your child instructions (not choices), avoid asking yes and no questions (\"Do you want a bath?\"). Instead, give clear instructions (\"Time for a bath.\").  Take your child to a dentist to discuss oral health.  Keep naptime and bedtime routines consistent.  This information is not intended to replace advice given to you by your health care provider. Make sure you discuss any questions you have with your health care " provider.  Document Revised: 12/16/2022 Document Reviewed: 12/16/2022  Elsevier Patient Education © 2023 [employer]     Pt is a 43 yo M w/ PMHx sickle cell disease, gout and cholecystectomy who presents with knee and back pain x 2 days. Pt states he comes in every month for blood transfusions 2/2 to sickle cell disease. He states his pain is a 10/10 sharp pain that is in most of the joints in his body, specifically jaw, knee, mouth, and back. Patient states he is also dizzy, has a right sided headache, is nauseous, and has worsening lower extremity swelling. Otherwise, denies CP, SOB, Fever, chills, constipation, diarrhea, numbness/tingling, bleeding, visual disturbances, or fevers. Of note, he is up to date on his immunizations.    His labs revealed a Hgb of 4.8, retic percent 15.8, absolute retic 304 and .  Pt received one unit PRBC w/ repeat hgb 6.2 consistent with his baseline.      Sickle cell crisis.   Admitted to medicine. Hem-onc Dr. Jordan consulted. Hb down to 5.7 and another unit of PRBC administered.   Hb up trended to 6.6 and then 6.9. Continued on IVF, folic acid, multivitamins and allopurinol. Colchicine was put on hold   due to MATA. Patient improving.    Anemia/SCD  Admitted to medicine. Hem-onc Dr. Jordan consulted. Hb down to 5.7 and another unit of PRBC administered.   Hb up trended to 6.6 and then 6.9. Continued on IVF, folic acid, multivitamins and allopurinol. Colchicine was put on hold   due to MATA. Patient improving.    MATA  Cr levels were 1.32 on admission. Continued on IVF. Renal function improved and went back to baseline.     Myalgia and arthralgia.  Pain management consulted. Pain controlled with oxycodone 30 mg PO q 4 hours PRN moderate pain which is home dose and  dilaudid 2mg IV q4h PRN severe pain. Sedation/ respiratory status was monitored closely.  For mild pain, Non-pharmacological   pain treatment recommendations were provided such as Warm/ Cool packs PRN and Repositioning, imagery, relaxation, distraction.  Improving.     Gout   Continued on IVF, folic acid, multivitamins and allopurinol. Colchicine was put on hold due to MATA. Patient improving.       A 42 year old male w/ PMHx sickle cell disease, gout and cholecystectomy who presents with knee and back pain x 2 days. Pt states he comes in every month for blood transfusions 2/2 to sickle cell disease. He states his pain is a 10/10 sharp pain that is in most of the joints in his body, specifically jaw, knee, mouth, and back. Patient states he is also dizzy, has a right sided headache, is nauseous, and has worsening lower extremity swelling. Otherwise, denies CP, SOB, Fever, chills, constipation, diarrhea, numbness/tingling, bleeding, visual disturbances, or fevers. Of note, he is up to date on his immunizations.  His labs revealed a Hgb of 4.8, retic percent 15.8, absolute retic 304 and .  Pt received one unit PRBC w/ repeat hgb 6.2 consistent with his baseline.      Sickle cell crisis.   Admitted to medicine. Hem-onc Dr. Jordan consulted. Hb down to 5.7 and another unit of PRBC administered.   Hb up trended to 6.6 and then 6.9. Continued on IVF, folic acid, multivitamins and allopurinol. Colchicine was put on hold   due to MATA. Patient improving.    Anemia/SCD  Admitted to medicine. Hem-onc Dr. Jordan consulted. Hb down to 5.7 and another unit of PRBC administered.   Hb up trended to 6.6 and then 6.9. Continued on IVF, folic acid, multivitamins and allopurinol. Colchicine was put on hold   due to MATA. Patient improving.    MATA  Cr levels were 1.32 on admission. Continued on IVF. Renal function improved and went back to baseline.     Myalgia and arthralgia.  Pain management consulted. Pain controlled with oxycodone 30 mg PO q 4 hours PRN moderate pain which is home dose and  dilaudid 2mg IV q4h PRN severe pain. Sedation/ respiratory status was monitored closely.  For mild pain, Non-pharmacological   pain treatment recommendations were provided such as Warm/ Cool packs PRN and Repositioning, imagery, relaxation, distraction.  Improving.     Gout   Continued on IVF, folic acid, multivitamins and allopurinol. Colchicine was put on hold due to MATA. Patient improving.

## 2024-08-13 ENCOUNTER — INPATIENT (INPATIENT)
Facility: HOSPITAL | Age: 44
LOS: 10 days | Discharge: HOME | End: 2024-08-24
Attending: STUDENT IN AN ORGANIZED HEALTH CARE EDUCATION/TRAINING PROGRAM | Admitting: STUDENT IN AN ORGANIZED HEALTH CARE EDUCATION/TRAINING PROGRAM
Payer: MEDICAID

## 2024-08-13 VITALS
RESPIRATION RATE: 25 BRPM | TEMPERATURE: 98 F | OXYGEN SATURATION: 93 % | HEIGHT: 69 IN | WEIGHT: 203.05 LBS | DIASTOLIC BLOOD PRESSURE: 81 MMHG | SYSTOLIC BLOOD PRESSURE: 153 MMHG | HEART RATE: 101 BPM

## 2024-08-13 DIAGNOSIS — Z90.49 ACQUIRED ABSENCE OF OTHER SPECIFIED PARTS OF DIGESTIVE TRACT: Chronic | ICD-10-CM

## 2024-08-13 PROCEDURE — 99285 EMERGENCY DEPT VISIT HI MDM: CPT | Mod: 25

## 2024-08-14 DIAGNOSIS — R52 PAIN, UNSPECIFIED: ICD-10-CM

## 2024-08-14 DIAGNOSIS — Z29.9 ENCOUNTER FOR PROPHYLACTIC MEASURES, UNSPECIFIED: ICD-10-CM

## 2024-08-14 DIAGNOSIS — D64.9 ANEMIA, UNSPECIFIED: ICD-10-CM

## 2024-08-14 DIAGNOSIS — R06.09 OTHER FORMS OF DYSPNEA: ICD-10-CM

## 2024-08-14 DIAGNOSIS — D57.00 HB-SS DISEASE WITH CRISIS, UNSPECIFIED: ICD-10-CM

## 2024-08-14 LAB
ALBUMIN SERPL ELPH-MCNC: 3.9 G/DL — SIGNIFICANT CHANGE UP (ref 3.3–5)
ALP SERPL-CCNC: 156 U/L — HIGH (ref 40–120)
ALT FLD-CCNC: 55 U/L — HIGH (ref 4–41)
ANION GAP SERPL CALC-SCNC: 11 MMOL/L — SIGNIFICANT CHANGE UP (ref 7–14)
ANISOCYTOSIS BLD QL: SIGNIFICANT CHANGE UP
AST SERPL-CCNC: 118 U/L — HIGH (ref 4–40)
BASOPHILS # BLD AUTO: 0.42 K/UL — HIGH (ref 0–0.2)
BASOPHILS NFR BLD AUTO: 3.6 % — HIGH (ref 0–2)
BILIRUB SERPL-MCNC: 5.9 MG/DL — HIGH (ref 0.2–1.2)
BUN SERPL-MCNC: 29 MG/DL — HIGH (ref 7–23)
CALCIUM SERPL-MCNC: 8.9 MG/DL — SIGNIFICANT CHANGE UP (ref 8.4–10.5)
CHLORIDE SERPL-SCNC: 100 MMOL/L — SIGNIFICANT CHANGE UP (ref 98–107)
CO2 SERPL-SCNC: 18 MMOL/L — LOW (ref 22–31)
CREAT SERPL-MCNC: 1.16 MG/DL — SIGNIFICANT CHANGE UP (ref 0.5–1.3)
EGFR: 79 ML/MIN/1.73M2 — SIGNIFICANT CHANGE UP
EGFR: 79 ML/MIN/1.73M2 — SIGNIFICANT CHANGE UP
EOSINOPHIL # BLD AUTO: 0 K/UL — SIGNIFICANT CHANGE UP (ref 0–0.5)
EOSINOPHIL NFR BLD AUTO: 0 % — SIGNIFICANT CHANGE UP (ref 0–6)
FLUAV AG NPH QL: SIGNIFICANT CHANGE UP
FLUBV AG NPH QL: SIGNIFICANT CHANGE UP
GIANT PLATELETS BLD QL SMEAR: PRESENT — SIGNIFICANT CHANGE UP
GLUCOSE SERPL-MCNC: 103 MG/DL — HIGH (ref 70–99)
HCT VFR BLD CALC: 16.8 % — CRITICAL LOW (ref 39–50)
HCT VFR BLD CALC: 17.2 % — CRITICAL LOW (ref 39–50)
HGB BLD-MCNC: 5.9 G/DL — CRITICAL LOW (ref 13–17)
HGB BLD-MCNC: 6.1 G/DL — CRITICAL LOW (ref 13–17)
IANC: 6.13 K/UL — SIGNIFICANT CHANGE UP (ref 1.8–7.4)
LYMPHOCYTES # BLD AUTO: 3.57 K/UL — HIGH (ref 1–3.3)
LYMPHOCYTES # BLD AUTO: 30.3 % — SIGNIFICANT CHANGE UP (ref 13–44)
MACROCYTES BLD QL: SIGNIFICANT CHANGE UP
MANUAL SMEAR VERIFICATION: SIGNIFICANT CHANGE UP
MCHC RBC-ENTMCNC: 28.8 PG — SIGNIFICANT CHANGE UP (ref 27–34)
MCHC RBC-ENTMCNC: 29.5 PG — SIGNIFICANT CHANGE UP (ref 27–34)
MCHC RBC-ENTMCNC: 35.1 GM/DL — SIGNIFICANT CHANGE UP (ref 32–36)
MCHC RBC-ENTMCNC: 35.5 GM/DL — SIGNIFICANT CHANGE UP (ref 32–36)
MCV RBC AUTO: 82 FL — SIGNIFICANT CHANGE UP (ref 80–100)
MCV RBC AUTO: 83.1 FL — SIGNIFICANT CHANGE UP (ref 80–100)
MICROCYTES BLD QL: SLIGHT — SIGNIFICANT CHANGE UP
MONOCYTES # BLD AUTO: 1.58 K/UL — HIGH (ref 0–0.9)
MONOCYTES NFR BLD AUTO: 13.4 % — SIGNIFICANT CHANGE UP (ref 2–14)
MYELOCYTES NFR BLD: 0.9 % — HIGH (ref 0–0)
NEUTROPHILS # BLD AUTO: 6.11 K/UL — SIGNIFICANT CHANGE UP (ref 1.8–7.4)
NEUTROPHILS NFR BLD AUTO: 51.8 % — SIGNIFICANT CHANGE UP (ref 43–77)
NRBC # BLD AUTO: 0.21 K/UL — HIGH (ref 0–0)
NRBC # BLD: 2 /100 WBCS — HIGH (ref 0–0)
NRBC # FLD: 0.21 K/UL — HIGH (ref 0–0)
NRBC BLD-RTO: 2 /100 WBCS — HIGH (ref 0–0)
PLAT MORPH BLD: NORMAL — SIGNIFICANT CHANGE UP
PLATELET # BLD AUTO: 327 K/UL — SIGNIFICANT CHANGE UP (ref 150–400)
PLATELET # BLD AUTO: 346 K/UL — SIGNIFICANT CHANGE UP (ref 150–400)
PLATELET COUNT - ESTIMATE: NORMAL — SIGNIFICANT CHANGE UP
POIKILOCYTOSIS BLD QL AUTO: SIGNIFICANT CHANGE UP
POLYCHROMASIA BLD QL SMEAR: SLIGHT — SIGNIFICANT CHANGE UP
POTASSIUM SERPL-MCNC: 4.5 MMOL/L — SIGNIFICANT CHANGE UP (ref 3.5–5.3)
POTASSIUM SERPL-SCNC: 4.5 MMOL/L — SIGNIFICANT CHANGE UP (ref 3.5–5.3)
PROT SERPL-MCNC: 7.8 G/DL — SIGNIFICANT CHANGE UP (ref 6–8.3)
RBC # BLD: 2.05 M/UL — LOW (ref 4.2–5.8)
RBC # BLD: 2.07 M/UL — LOW (ref 4.2–5.8)
RBC # BLD: 2.07 M/UL — LOW (ref 4.2–5.8)
RBC # FLD: 23.6 % — HIGH (ref 10.3–14.5)
RBC # FLD: 24.3 % — HIGH (ref 10.3–14.5)
RBC BLD AUTO: ABNORMAL
RETICS #: 346.5 K/UL — HIGH (ref 25–125)
RETICS/RBC NFR: 16.7 % — HIGH (ref 0.5–2.5)
RH IG SCN BLD-IMP: POSITIVE — SIGNIFICANT CHANGE UP
RSV RNA NPH QL NAA+NON-PROBE: SIGNIFICANT CHANGE UP
SARS-COV-2 RNA SPEC QL NAA+PROBE: SIGNIFICANT CHANGE UP
SICKLE CELLS BLD QL SMEAR: SIGNIFICANT CHANGE UP
SMUDGE CELLS # BLD: PRESENT — SIGNIFICANT CHANGE UP
SODIUM SERPL-SCNC: 129 MMOL/L — LOW (ref 135–145)
TARGETS BLD QL SMEAR: SLIGHT — SIGNIFICANT CHANGE UP
TROPONIN T, HIGH SENSITIVITY RESULT: 13 NG/L — SIGNIFICANT CHANGE UP
WBC # BLD: 11.79 K/UL — HIGH (ref 3.8–10.5)
WBC # BLD: 9.23 K/UL — SIGNIFICANT CHANGE UP (ref 3.8–10.5)
WBC # FLD AUTO: 11.79 K/UL — HIGH (ref 3.8–10.5)
WBC # FLD AUTO: 9.23 K/UL — SIGNIFICANT CHANGE UP (ref 3.8–10.5)

## 2024-08-14 PROCEDURE — 99223 1ST HOSP IP/OBS HIGH 75: CPT

## 2024-08-14 PROCEDURE — 71046 X-RAY EXAM CHEST 2 VIEWS: CPT | Mod: 26

## 2024-08-14 PROCEDURE — 71275 CT ANGIOGRAPHY CHEST: CPT | Mod: 26

## 2024-08-14 RX ORDER — DIPHENHYDRAMINE HCL 12.5MG/5ML
25 ELIXIR ORAL EVERY 4 HOURS
Refills: 0 | Status: DISCONTINUED | OUTPATIENT
Start: 2024-08-14 | End: 2024-08-24

## 2024-08-14 RX ORDER — HYDROMORPHONE/SOD CHLOR,ISO/PF 2 MG/10 ML
2 SYRINGE (ML) INJECTION ONCE
Refills: 0 | Status: DISCONTINUED | OUTPATIENT
Start: 2024-08-14 | End: 2024-08-14

## 2024-08-14 RX ORDER — SODIUM CHLORIDE 9 G/1000ML
1000 INJECTION, SOLUTION INTRAVENOUS
Refills: 0 | Status: DISCONTINUED | OUTPATIENT
Start: 2024-08-14 | End: 2024-08-20

## 2024-08-14 RX ORDER — FOLIC ACID 1 MG/1
1 TABLET ORAL DAILY
Refills: 0 | Status: DISCONTINUED | OUTPATIENT
Start: 2024-08-14 | End: 2024-08-24

## 2024-08-14 RX ORDER — KETOROLAC TROMETHAMINE 30 MG/ML
15 INJECTION, SOLUTION INTRAMUSCULAR; INTRAVENOUS ONCE
Refills: 0 | Status: DISCONTINUED | OUTPATIENT
Start: 2024-08-14 | End: 2024-08-14

## 2024-08-14 RX ORDER — SENNA 187 MG
2 TABLET ORAL AT BEDTIME
Refills: 0 | Status: DISCONTINUED | OUTPATIENT
Start: 2024-08-14 | End: 2024-08-24

## 2024-08-14 RX ORDER — NALOXONE HYDROCHLORIDE 0.4 MG/ML
0.1 INJECTION, SOLUTION INTRAMUSCULAR; INTRAVENOUS; SUBCUTANEOUS
Refills: 0 | Status: DISCONTINUED | OUTPATIENT
Start: 2024-08-14 | End: 2024-08-24

## 2024-08-14 RX ORDER — ONDANSETRON HCL/PF 4 MG/2 ML
4 VIAL (ML) INJECTION EVERY 6 HOURS
Refills: 0 | Status: DISCONTINUED | OUTPATIENT
Start: 2024-08-14 | End: 2024-08-24

## 2024-08-14 RX ORDER — HYDROMORPHONE/SOD CHLOR,ISO/PF 2 MG/10 ML
30 SYRINGE (ML) INJECTION
Refills: 0 | Status: DISCONTINUED | OUTPATIENT
Start: 2024-08-14 | End: 2024-08-15

## 2024-08-14 RX ADMIN — Medication 2 MILLIGRAM(S): at 15:11

## 2024-08-14 RX ADMIN — KETOROLAC TROMETHAMINE 15 MILLIGRAM(S): 30 INJECTION, SOLUTION INTRAMUSCULAR; INTRAVENOUS at 22:30

## 2024-08-14 RX ADMIN — Medication 2 MILLIGRAM(S): at 15:08

## 2024-08-14 RX ADMIN — Medication 30 MILLILITER(S): at 20:17

## 2024-08-14 RX ADMIN — Medication 2 MILLIGRAM(S): at 07:01

## 2024-08-14 RX ADMIN — Medication 2 MILLIGRAM(S): at 09:22

## 2024-08-14 RX ADMIN — Medication 2 MILLIGRAM(S): at 03:14

## 2024-08-14 RX ADMIN — Medication 1000 MILLILITER(S): at 03:15

## 2024-08-14 RX ADMIN — KETOROLAC TROMETHAMINE 15 MILLIGRAM(S): 30 INJECTION, SOLUTION INTRAMUSCULAR; INTRAVENOUS at 10:39

## 2024-08-14 RX ADMIN — KETOROLAC TROMETHAMINE 15 MILLIGRAM(S): 30 INJECTION, SOLUTION INTRAMUSCULAR; INTRAVENOUS at 15:12

## 2024-08-14 RX ADMIN — Medication 2 MILLIGRAM(S): at 09:24

## 2024-08-14 RX ADMIN — Medication 30 MILLILITER(S): at 18:05

## 2024-08-14 RX ADMIN — SODIUM CHLORIDE 75 MILLILITER(S): 9 INJECTION, SOLUTION INTRAVENOUS at 18:06

## 2024-08-14 RX ADMIN — Medication 2 MILLIGRAM(S): at 12:57

## 2024-08-14 RX ADMIN — KETOROLAC TROMETHAMINE 15 MILLIGRAM(S): 30 INJECTION, SOLUTION INTRAMUSCULAR; INTRAVENOUS at 21:56

## 2024-08-15 LAB
ALBUMIN SERPL ELPH-MCNC: 3.7 G/DL — SIGNIFICANT CHANGE UP (ref 3.3–5)
ALP SERPL-CCNC: 163 U/L — HIGH (ref 40–120)
ALT FLD-CCNC: 53 U/L — HIGH (ref 4–41)
ANION GAP SERPL CALC-SCNC: 13 MMOL/L — SIGNIFICANT CHANGE UP (ref 7–14)
AST SERPL-CCNC: 105 U/L — HIGH (ref 4–40)
BASOPHILS # BLD AUTO: 0.09 K/UL — SIGNIFICANT CHANGE UP (ref 0–0.2)
BASOPHILS NFR BLD AUTO: 1.1 % — SIGNIFICANT CHANGE UP (ref 0–2)
BILIRUB SERPL-MCNC: 5.8 MG/DL — HIGH (ref 0.2–1.2)
BLD GP AB SCN SERPL QL: NEGATIVE — SIGNIFICANT CHANGE UP
BUN SERPL-MCNC: 25 MG/DL — HIGH (ref 7–23)
CALCIUM SERPL-MCNC: 8.9 MG/DL — SIGNIFICANT CHANGE UP (ref 8.4–10.5)
CHLORIDE SERPL-SCNC: 105 MMOL/L — SIGNIFICANT CHANGE UP (ref 98–107)
CO2 SERPL-SCNC: 17 MMOL/L — LOW (ref 22–31)
CREAT SERPL-MCNC: 0.94 MG/DL — SIGNIFICANT CHANGE UP (ref 0.5–1.3)
EGFR: 102 ML/MIN/1.73M2 — SIGNIFICANT CHANGE UP
EGFR: 102 ML/MIN/1.73M2 — SIGNIFICANT CHANGE UP
EOSINOPHIL # BLD AUTO: 0.31 K/UL — SIGNIFICANT CHANGE UP (ref 0–0.5)
EOSINOPHIL NFR BLD AUTO: 3.7 % — SIGNIFICANT CHANGE UP (ref 0–6)
GLUCOSE SERPL-MCNC: 107 MG/DL — HIGH (ref 70–99)
HAPTOGLOB SERPL-MCNC: <20 MG/DL — LOW (ref 34–200)
HCT VFR BLD CALC: 15.8 % — CRITICAL LOW (ref 39–50)
HGB BLD-MCNC: 5.7 G/DL — CRITICAL LOW (ref 13–17)
IANC: 5.36 K/UL — SIGNIFICANT CHANGE UP (ref 1.8–7.4)
IMM GRANULOCYTES NFR BLD AUTO: 0.8 % — SIGNIFICANT CHANGE UP (ref 0–0.9)
LDH SERPL L TO P-CCNC: 838 U/L — HIGH (ref 135–225)
LYMPHOCYTES # BLD AUTO: 1.7 K/UL — SIGNIFICANT CHANGE UP (ref 1–3.3)
LYMPHOCYTES # BLD AUTO: 20 % — SIGNIFICANT CHANGE UP (ref 13–44)
MAGNESIUM SERPL-MCNC: 1.8 MG/DL — SIGNIFICANT CHANGE UP (ref 1.6–2.6)
MCHC RBC-ENTMCNC: 29.8 PG — SIGNIFICANT CHANGE UP (ref 27–34)
MCHC RBC-ENTMCNC: 36.1 GM/DL — HIGH (ref 32–36)
MCV RBC AUTO: 82.7 FL — SIGNIFICANT CHANGE UP (ref 80–100)
MONOCYTES # BLD AUTO: 0.96 K/UL — HIGH (ref 0–0.9)
MONOCYTES NFR BLD AUTO: 11.3 % — SIGNIFICANT CHANGE UP (ref 2–14)
NEUTROPHILS # BLD AUTO: 5.36 K/UL — SIGNIFICANT CHANGE UP (ref 1.8–7.4)
NEUTROPHILS NFR BLD AUTO: 63.1 % — SIGNIFICANT CHANGE UP (ref 43–77)
NRBC # BLD AUTO: 0.26 K/UL — HIGH (ref 0–0)
NRBC # BLD: 3 /100 WBCS — HIGH (ref 0–0)
NRBC # FLD: 0.26 K/UL — HIGH (ref 0–0)
NRBC BLD-RTO: 3 /100 WBCS — HIGH (ref 0–0)
PLATELET # BLD AUTO: 326 K/UL — SIGNIFICANT CHANGE UP (ref 150–400)
POTASSIUM SERPL-MCNC: 4.8 MMOL/L — SIGNIFICANT CHANGE UP (ref 3.5–5.3)
POTASSIUM SERPL-SCNC: 4.8 MMOL/L — SIGNIFICANT CHANGE UP (ref 3.5–5.3)
PROT SERPL-MCNC: 7.3 G/DL — SIGNIFICANT CHANGE UP (ref 6–8.3)
RBC # BLD: 1.91 M/UL — LOW (ref 4.2–5.8)
RBC # BLD: 1.91 M/UL — LOW (ref 4.2–5.8)
RBC # FLD: 24.1 % — HIGH (ref 10.3–14.5)
RETICS #: 306.7 K/UL — HIGH (ref 25–125)
RETICS/RBC NFR: 16.1 % — HIGH (ref 0.5–2.5)
RH IG SCN BLD-IMP: POSITIVE — SIGNIFICANT CHANGE UP
SODIUM SERPL-SCNC: 135 MMOL/L — SIGNIFICANT CHANGE UP (ref 135–145)
WBC # BLD: 8.49 K/UL — SIGNIFICANT CHANGE UP (ref 3.8–10.5)
WBC # FLD AUTO: 8.49 K/UL — SIGNIFICANT CHANGE UP (ref 3.8–10.5)

## 2024-08-15 PROCEDURE — 99233 SBSQ HOSP IP/OBS HIGH 50: CPT

## 2024-08-15 RX ORDER — HYDROMORPHONE/SOD CHLOR,ISO/PF 2 MG/10 ML
30 SYRINGE (ML) INJECTION
Refills: 0 | Status: DISCONTINUED | OUTPATIENT
Start: 2024-08-15 | End: 2024-08-16

## 2024-08-15 RX ORDER — HYDROMORPHONE/SOD CHLOR,ISO/PF 2 MG/10 ML
30 SYRINGE (ML) INJECTION
Refills: 0 | Status: DISCONTINUED | OUTPATIENT
Start: 2024-08-15 | End: 2024-08-15

## 2024-08-15 RX ORDER — POLYETHYLENE GLYCOL 3350 17 G/17G
17 POWDER, FOR SOLUTION ORAL DAILY
Refills: 0 | Status: DISCONTINUED | OUTPATIENT
Start: 2024-08-15 | End: 2024-08-24

## 2024-08-15 RX ORDER — ACETAMINOPHEN 500 MG/5ML
650 LIQUID (ML) ORAL ONCE
Refills: 0 | Status: COMPLETED | OUTPATIENT
Start: 2024-08-15 | End: 2024-08-15

## 2024-08-15 RX ORDER — DIPHENHYDRAMINE HCL 12.5MG/5ML
25 ELIXIR ORAL ONCE
Refills: 0 | Status: COMPLETED | OUTPATIENT
Start: 2024-08-15 | End: 2024-08-15

## 2024-08-15 RX ADMIN — Medication 30 MILLILITER(S): at 11:07

## 2024-08-15 RX ADMIN — Medication 25 MILLIGRAM(S): at 18:59

## 2024-08-15 RX ADMIN — Medication 30 MILLILITER(S): at 10:20

## 2024-08-15 RX ADMIN — Medication 40 MILLIGRAM(S): at 06:07

## 2024-08-15 RX ADMIN — FOLIC ACID 1 MILLIGRAM(S): 1 TABLET ORAL at 11:05

## 2024-08-15 RX ADMIN — Medication 30 MILLILITER(S): at 21:34

## 2024-08-15 RX ADMIN — Medication 30 MILLILITER(S): at 15:01

## 2024-08-15 RX ADMIN — SODIUM CHLORIDE 75 MILLILITER(S): 9 INJECTION, SOLUTION INTRAVENOUS at 19:32

## 2024-08-15 RX ADMIN — Medication 650 MILLIGRAM(S): at 18:58

## 2024-08-15 RX ADMIN — Medication 30 MILLILITER(S): at 22:43

## 2024-08-15 RX ADMIN — Medication 30 MILLILITER(S): at 08:23

## 2024-08-16 LAB
ALBUMIN SERPL ELPH-MCNC: 3.7 G/DL — SIGNIFICANT CHANGE UP (ref 3.3–5)
ALP SERPL-CCNC: 158 U/L — HIGH (ref 40–120)
ALT FLD-CCNC: 46 U/L — HIGH (ref 4–41)
ANION GAP SERPL CALC-SCNC: 10 MMOL/L — SIGNIFICANT CHANGE UP (ref 7–14)
AST SERPL-CCNC: 99 U/L — HIGH (ref 4–40)
BILIRUB SERPL-MCNC: 5.6 MG/DL — HIGH (ref 0.2–1.2)
BUN SERPL-MCNC: 24 MG/DL — HIGH (ref 7–23)
CALCIUM SERPL-MCNC: 8.6 MG/DL — SIGNIFICANT CHANGE UP (ref 8.4–10.5)
CHLORIDE SERPL-SCNC: 103 MMOL/L — SIGNIFICANT CHANGE UP (ref 98–107)
CO2 SERPL-SCNC: 20 MMOL/L — LOW (ref 22–31)
CREAT SERPL-MCNC: 0.97 MG/DL — SIGNIFICANT CHANGE UP (ref 0.5–1.3)
EGFR: 98 ML/MIN/1.73M2 — SIGNIFICANT CHANGE UP
EGFR: 98 ML/MIN/1.73M2 — SIGNIFICANT CHANGE UP
GLUCOSE SERPL-MCNC: 99 MG/DL — SIGNIFICANT CHANGE UP (ref 70–99)
HAPTOGLOB SERPL-MCNC: <20 MG/DL — LOW (ref 34–200)
HCT VFR BLD CALC: 18.5 % — CRITICAL LOW (ref 39–50)
HGB BLD-MCNC: 6.6 G/DL — CRITICAL LOW (ref 13–17)
LDH SERPL L TO P-CCNC: 889 U/L — HIGH (ref 135–225)
MAGNESIUM SERPL-MCNC: 1.6 MG/DL — SIGNIFICANT CHANGE UP (ref 1.6–2.6)
MCHC RBC-ENTMCNC: 28.8 PG — SIGNIFICANT CHANGE UP (ref 27–34)
MCHC RBC-ENTMCNC: 35.7 GM/DL — SIGNIFICANT CHANGE UP (ref 32–36)
MCV RBC AUTO: 80.8 FL — SIGNIFICANT CHANGE UP (ref 80–100)
MRSA PCR RESULT.: SIGNIFICANT CHANGE UP
NRBC # BLD AUTO: 0.15 K/UL — HIGH (ref 0–0)
NRBC # BLD: 2 /100 WBCS — HIGH (ref 0–0)
NRBC # FLD: 0.15 K/UL — HIGH (ref 0–0)
NRBC BLD-RTO: 2 /100 WBCS — HIGH (ref 0–0)
PHOSPHATE SERPL-MCNC: 4.1 MG/DL — SIGNIFICANT CHANGE UP (ref 2.5–4.5)
PLATELET # BLD AUTO: 291 K/UL — SIGNIFICANT CHANGE UP (ref 150–400)
POTASSIUM SERPL-MCNC: 5.4 MMOL/L — HIGH (ref 3.5–5.3)
POTASSIUM SERPL-SCNC: 5.4 MMOL/L — HIGH (ref 3.5–5.3)
PROT SERPL-MCNC: 7.5 G/DL — SIGNIFICANT CHANGE UP (ref 6–8.3)
RBC # BLD: 2.29 M/UL — LOW (ref 4.2–5.8)
RBC # BLD: 2.29 M/UL — LOW (ref 4.2–5.8)
RBC # FLD: 21.8 % — HIGH (ref 10.3–14.5)
RETICS #: 249.6 K/UL — HIGH (ref 25–125)
RETICS/RBC NFR: 10.9 % — HIGH (ref 0.5–2.5)
S AUREUS DNA NOSE QL NAA+PROBE: SIGNIFICANT CHANGE UP
SODIUM SERPL-SCNC: 133 MMOL/L — LOW (ref 135–145)
WBC # BLD: 10.23 K/UL — SIGNIFICANT CHANGE UP (ref 3.8–10.5)
WBC # FLD AUTO: 10.23 K/UL — SIGNIFICANT CHANGE UP (ref 3.8–10.5)

## 2024-08-16 PROCEDURE — 99233 SBSQ HOSP IP/OBS HIGH 50: CPT

## 2024-08-16 RX ORDER — KETOROLAC TROMETHAMINE 30 MG/ML
30 INJECTION, SOLUTION INTRAMUSCULAR; INTRAVENOUS EVERY 6 HOURS
Refills: 0 | Status: DISCONTINUED | OUTPATIENT
Start: 2024-08-16 | End: 2024-08-19

## 2024-08-16 RX ORDER — HYDROMORPHONE/SOD CHLOR,ISO/PF 2 MG/10 ML
30 SYRINGE (ML) INJECTION
Refills: 0 | Status: DISCONTINUED | OUTPATIENT
Start: 2024-08-16 | End: 2024-08-16

## 2024-08-16 RX ORDER — HYDROMORPHONE/SOD CHLOR,ISO/PF 2 MG/10 ML
30 SYRINGE (ML) INJECTION
Refills: 0 | Status: DISCONTINUED | OUTPATIENT
Start: 2024-08-16 | End: 2024-08-22

## 2024-08-16 RX ADMIN — Medication 30 MILLILITER(S): at 15:29

## 2024-08-16 RX ADMIN — POLYETHYLENE GLYCOL 3350 17 GRAM(S): 17 POWDER, FOR SOLUTION ORAL at 17:14

## 2024-08-16 RX ADMIN — SODIUM CHLORIDE 75 MILLILITER(S): 9 INJECTION, SOLUTION INTRAVENOUS at 21:13

## 2024-08-16 RX ADMIN — Medication 30 MILLILITER(S): at 19:44

## 2024-08-16 RX ADMIN — Medication 30 MILLILITER(S): at 07:37

## 2024-08-16 RX ADMIN — KETOROLAC TROMETHAMINE 30 MILLIGRAM(S): 30 INJECTION, SOLUTION INTRAMUSCULAR; INTRAVENOUS at 17:12

## 2024-08-16 RX ADMIN — Medication 1 APPLICATION(S): at 11:18

## 2024-08-16 RX ADMIN — KETOROLAC TROMETHAMINE 30 MILLIGRAM(S): 30 INJECTION, SOLUTION INTRAMUSCULAR; INTRAVENOUS at 23:26

## 2024-08-16 RX ADMIN — FOLIC ACID 1 MILLIGRAM(S): 1 TABLET ORAL at 11:18

## 2024-08-16 RX ADMIN — Medication 30 MILLILITER(S): at 10:14

## 2024-08-16 RX ADMIN — SODIUM CHLORIDE 75 MILLILITER(S): 9 INJECTION, SOLUTION INTRAVENOUS at 08:30

## 2024-08-16 RX ADMIN — Medication 40 MILLIGRAM(S): at 07:02

## 2024-08-17 LAB
ANION GAP SERPL CALC-SCNC: 11 MMOL/L — SIGNIFICANT CHANGE UP (ref 7–14)
ANION GAP SERPL CALC-SCNC: 9 MMOL/L — SIGNIFICANT CHANGE UP (ref 7–14)
BASOPHILS # BLD AUTO: 0.08 K/UL — SIGNIFICANT CHANGE UP (ref 0–0.2)
BASOPHILS NFR BLD AUTO: 0.9 % — SIGNIFICANT CHANGE UP (ref 0–2)
BUN SERPL-MCNC: 26 MG/DL — HIGH (ref 7–23)
BUN SERPL-MCNC: 30 MG/DL — HIGH (ref 7–23)
CALCIUM SERPL-MCNC: 8.8 MG/DL — SIGNIFICANT CHANGE UP (ref 8.4–10.5)
CALCIUM SERPL-MCNC: 8.8 MG/DL — SIGNIFICANT CHANGE UP (ref 8.4–10.5)
CHLORIDE SERPL-SCNC: 103 MMOL/L — SIGNIFICANT CHANGE UP (ref 98–107)
CHLORIDE SERPL-SCNC: 105 MMOL/L — SIGNIFICANT CHANGE UP (ref 98–107)
CO2 SERPL-SCNC: 19 MMOL/L — LOW (ref 22–31)
CO2 SERPL-SCNC: 20 MMOL/L — LOW (ref 22–31)
CREAT SERPL-MCNC: 0.89 MG/DL — SIGNIFICANT CHANGE UP (ref 0.5–1.3)
CREAT SERPL-MCNC: 0.95 MG/DL — SIGNIFICANT CHANGE UP (ref 0.5–1.3)
EGFR: 101 ML/MIN/1.73M2 — SIGNIFICANT CHANGE UP
EGFR: 101 ML/MIN/1.73M2 — SIGNIFICANT CHANGE UP
EGFR: 108 ML/MIN/1.73M2 — SIGNIFICANT CHANGE UP
EGFR: 108 ML/MIN/1.73M2 — SIGNIFICANT CHANGE UP
EOSINOPHIL # BLD AUTO: 0.91 K/UL — HIGH (ref 0–0.5)
EOSINOPHIL NFR BLD AUTO: 10.8 % — HIGH (ref 0–6)
GLUCOSE BLDC GLUCOMTR-MCNC: 129 MG/DL — HIGH (ref 70–99)
GLUCOSE SERPL-MCNC: 107 MG/DL — HIGH (ref 70–99)
GLUCOSE SERPL-MCNC: 108 MG/DL — HIGH (ref 70–99)
HCT VFR BLD CALC: 17.4 % — CRITICAL LOW (ref 39–50)
HGB BLD-MCNC: 6.2 G/DL — CRITICAL LOW (ref 13–17)
IANC: 4.88 K/UL — SIGNIFICANT CHANGE UP (ref 1.8–7.4)
IMM GRANULOCYTES NFR BLD AUTO: 0.6 % — SIGNIFICANT CHANGE UP (ref 0–0.9)
LYMPHOCYTES # BLD AUTO: 1.64 K/UL — SIGNIFICANT CHANGE UP (ref 1–3.3)
LYMPHOCYTES # BLD AUTO: 19.4 % — SIGNIFICANT CHANGE UP (ref 13–44)
MAGNESIUM SERPL-MCNC: 1.7 MG/DL — SIGNIFICANT CHANGE UP (ref 1.6–2.6)
MAGNESIUM SERPL-MCNC: 1.7 MG/DL — SIGNIFICANT CHANGE UP (ref 1.6–2.6)
MCHC RBC-ENTMCNC: 29.1 PG — SIGNIFICANT CHANGE UP (ref 27–34)
MCHC RBC-ENTMCNC: 35.6 GM/DL — SIGNIFICANT CHANGE UP (ref 32–36)
MCV RBC AUTO: 81.7 FL — SIGNIFICANT CHANGE UP (ref 80–100)
MONOCYTES # BLD AUTO: 0.9 K/UL — SIGNIFICANT CHANGE UP (ref 0–0.9)
MONOCYTES NFR BLD AUTO: 10.6 % — SIGNIFICANT CHANGE UP (ref 2–14)
NEUTROPHILS # BLD AUTO: 4.88 K/UL — SIGNIFICANT CHANGE UP (ref 1.8–7.4)
NEUTROPHILS NFR BLD AUTO: 57.7 % — SIGNIFICANT CHANGE UP (ref 43–77)
NRBC # BLD AUTO: 0.12 K/UL — HIGH (ref 0–0)
NRBC # BLD: 1 /100 WBCS — HIGH (ref 0–0)
NRBC # FLD: 0.12 K/UL — HIGH (ref 0–0)
NRBC BLD-RTO: 1 /100 WBCS — HIGH (ref 0–0)
PHOSPHATE SERPL-MCNC: 3.9 MG/DL — SIGNIFICANT CHANGE UP (ref 2.5–4.5)
PHOSPHATE SERPL-MCNC: 4.1 MG/DL — SIGNIFICANT CHANGE UP (ref 2.5–4.5)
PLATELET # BLD AUTO: 265 K/UL — SIGNIFICANT CHANGE UP (ref 150–400)
POTASSIUM SERPL-MCNC: 5.1 MMOL/L — SIGNIFICANT CHANGE UP (ref 3.5–5.3)
POTASSIUM SERPL-MCNC: 5.6 MMOL/L — HIGH (ref 3.5–5.3)
POTASSIUM SERPL-SCNC: 5.1 MMOL/L — SIGNIFICANT CHANGE UP (ref 3.5–5.3)
POTASSIUM SERPL-SCNC: 5.6 MMOL/L — HIGH (ref 3.5–5.3)
RBC # BLD: 2.13 M/UL — LOW (ref 4.2–5.8)
RBC # BLD: 2.13 M/UL — LOW (ref 4.2–5.8)
RBC # FLD: 20.6 % — HIGH (ref 10.3–14.5)
RETICS #: 223.7 K/UL — HIGH (ref 25–125)
RETICS/RBC NFR: 10.5 % — HIGH (ref 0.5–2.5)
SODIUM SERPL-SCNC: 133 MMOL/L — LOW (ref 135–145)
SODIUM SERPL-SCNC: 134 MMOL/L — LOW (ref 135–145)
WBC # BLD: 8.46 K/UL — SIGNIFICANT CHANGE UP (ref 3.8–10.5)
WBC # FLD AUTO: 8.46 K/UL — SIGNIFICANT CHANGE UP (ref 3.8–10.5)

## 2024-08-17 PROCEDURE — 99232 SBSQ HOSP IP/OBS MODERATE 35: CPT

## 2024-08-17 RX ORDER — SODIUM ZIRCONIUM CYCLOSILICATE 5 G/5G
5 POWDER, FOR SUSPENSION ORAL ONCE
Refills: 0 | Status: COMPLETED | OUTPATIENT
Start: 2024-08-17 | End: 2024-08-17

## 2024-08-17 RX ORDER — DEXTROSE 50 % IN WATER 50 %
50 SYRINGE (ML) INTRAVENOUS ONCE
Refills: 0 | Status: COMPLETED | OUTPATIENT
Start: 2024-08-17 | End: 2024-08-17

## 2024-08-17 RX ORDER — SODIUM ZIRCONIUM CYCLOSILICATE 5 G/5G
10 POWDER, FOR SUSPENSION ORAL ONCE
Refills: 0 | Status: COMPLETED | OUTPATIENT
Start: 2024-08-17 | End: 2024-08-17

## 2024-08-17 RX ADMIN — KETOROLAC TROMETHAMINE 30 MILLIGRAM(S): 30 INJECTION, SOLUTION INTRAMUSCULAR; INTRAVENOUS at 06:19

## 2024-08-17 RX ADMIN — KETOROLAC TROMETHAMINE 30 MILLIGRAM(S): 30 INJECTION, SOLUTION INTRAMUSCULAR; INTRAVENOUS at 11:44

## 2024-08-17 RX ADMIN — Medication 5 UNIT(S): at 14:52

## 2024-08-17 RX ADMIN — Medication 40 MILLIGRAM(S): at 08:35

## 2024-08-17 RX ADMIN — FOLIC ACID 1 MILLIGRAM(S): 1 TABLET ORAL at 11:44

## 2024-08-17 RX ADMIN — Medication 4 MILLIGRAM(S): at 08:35

## 2024-08-17 RX ADMIN — POLYETHYLENE GLYCOL 3350 17 GRAM(S): 17 POWDER, FOR SOLUTION ORAL at 11:47

## 2024-08-17 RX ADMIN — Medication 30 MILLILITER(S): at 07:35

## 2024-08-17 RX ADMIN — Medication 1 APPLICATION(S): at 11:45

## 2024-08-17 RX ADMIN — Medication 4 MILLIGRAM(S): at 18:09

## 2024-08-17 RX ADMIN — SODIUM ZIRCONIUM CYCLOSILICATE 10 GRAM(S): 5 POWDER, FOR SUSPENSION ORAL at 14:52

## 2024-08-17 RX ADMIN — SODIUM ZIRCONIUM CYCLOSILICATE 5 GRAM(S): 5 POWDER, FOR SUSPENSION ORAL at 06:07

## 2024-08-17 RX ADMIN — SODIUM CHLORIDE 75 MILLILITER(S): 9 INJECTION, SOLUTION INTRAVENOUS at 23:20

## 2024-08-17 RX ADMIN — KETOROLAC TROMETHAMINE 30 MILLIGRAM(S): 30 INJECTION, SOLUTION INTRAMUSCULAR; INTRAVENOUS at 00:12

## 2024-08-17 RX ADMIN — Medication 50 MILLILITER(S): at 14:52

## 2024-08-17 RX ADMIN — KETOROLAC TROMETHAMINE 30 MILLIGRAM(S): 30 INJECTION, SOLUTION INTRAMUSCULAR; INTRAVENOUS at 18:08

## 2024-08-17 RX ADMIN — KETOROLAC TROMETHAMINE 30 MILLIGRAM(S): 30 INJECTION, SOLUTION INTRAMUSCULAR; INTRAVENOUS at 23:43

## 2024-08-17 RX ADMIN — Medication 4 MILLIGRAM(S): at 00:16

## 2024-08-17 RX ADMIN — Medication 30 MILLILITER(S): at 19:46

## 2024-08-17 RX ADMIN — KETOROLAC TROMETHAMINE 30 MILLIGRAM(S): 30 INJECTION, SOLUTION INTRAMUSCULAR; INTRAVENOUS at 12:14

## 2024-08-17 RX ADMIN — KETOROLAC TROMETHAMINE 30 MILLIGRAM(S): 30 INJECTION, SOLUTION INTRAMUSCULAR; INTRAVENOUS at 18:38

## 2024-08-18 LAB
ALBUMIN SERPL ELPH-MCNC: 3.5 G/DL — SIGNIFICANT CHANGE UP (ref 3.3–5)
ALP SERPL-CCNC: 145 U/L — HIGH (ref 40–120)
ALT FLD-CCNC: 36 U/L — SIGNIFICANT CHANGE UP (ref 4–41)
ANION GAP SERPL CALC-SCNC: 12 MMOL/L — SIGNIFICANT CHANGE UP (ref 7–14)
AST SERPL-CCNC: 101 U/L — HIGH (ref 4–40)
BASOPHILS # BLD AUTO: 0.08 K/UL — SIGNIFICANT CHANGE UP (ref 0–0.2)
BASOPHILS NFR BLD AUTO: 1 % — SIGNIFICANT CHANGE UP (ref 0–2)
BILIRUB SERPL-MCNC: 6.1 MG/DL — HIGH (ref 0.2–1.2)
BUN SERPL-MCNC: 33 MG/DL — HIGH (ref 7–23)
CALCIUM SERPL-MCNC: 8.6 MG/DL — SIGNIFICANT CHANGE UP (ref 8.4–10.5)
CHLORIDE SERPL-SCNC: 104 MMOL/L — SIGNIFICANT CHANGE UP (ref 98–107)
CO2 SERPL-SCNC: 17 MMOL/L — LOW (ref 22–31)
CREAT SERPL-MCNC: 1.01 MG/DL — SIGNIFICANT CHANGE UP (ref 0.5–1.3)
EGFR: 93 ML/MIN/1.73M2 — SIGNIFICANT CHANGE UP
EGFR: 93 ML/MIN/1.73M2 — SIGNIFICANT CHANGE UP
EOSINOPHIL # BLD AUTO: 0.83 K/UL — HIGH (ref 0–0.5)
EOSINOPHIL NFR BLD AUTO: 10 % — HIGH (ref 0–6)
GLUCOSE SERPL-MCNC: 98 MG/DL — SIGNIFICANT CHANGE UP (ref 70–99)
HCT VFR BLD CALC: 16.6 % — CRITICAL LOW (ref 39–50)
HGB BLD-MCNC: 5.9 G/DL — CRITICAL LOW (ref 13–17)
IANC: 3.74 K/UL — SIGNIFICANT CHANGE UP (ref 1.8–7.4)
IMM GRANULOCYTES NFR BLD AUTO: 0.5 % — SIGNIFICANT CHANGE UP (ref 0–0.9)
LDH SERPL L TO P-CCNC: 987 U/L — HIGH (ref 135–225)
LYMPHOCYTES # BLD AUTO: 2.61 K/UL — SIGNIFICANT CHANGE UP (ref 1–3.3)
LYMPHOCYTES # BLD AUTO: 31.4 % — SIGNIFICANT CHANGE UP (ref 13–44)
MAGNESIUM SERPL-MCNC: 1.7 MG/DL — SIGNIFICANT CHANGE UP (ref 1.6–2.6)
MCHC RBC-ENTMCNC: 28.6 PG — SIGNIFICANT CHANGE UP (ref 27–34)
MCHC RBC-ENTMCNC: 35.5 GM/DL — SIGNIFICANT CHANGE UP (ref 32–36)
MCV RBC AUTO: 80.6 FL — SIGNIFICANT CHANGE UP (ref 80–100)
MONOCYTES # BLD AUTO: 1.02 K/UL — HIGH (ref 0–0.9)
MONOCYTES NFR BLD AUTO: 12.3 % — SIGNIFICANT CHANGE UP (ref 2–14)
NEUTROPHILS # BLD AUTO: 3.74 K/UL — SIGNIFICANT CHANGE UP (ref 1.8–7.4)
NEUTROPHILS NFR BLD AUTO: 44.8 % — SIGNIFICANT CHANGE UP (ref 43–77)
NRBC # BLD AUTO: 0.09 K/UL — HIGH (ref 0–0)
NRBC # BLD: 1 /100 WBCS — HIGH (ref 0–0)
NRBC # FLD: 0.09 K/UL — HIGH (ref 0–0)
NRBC BLD-RTO: 1 /100 WBCS — HIGH (ref 0–0)
PHOSPHATE SERPL-MCNC: 3.8 MG/DL — SIGNIFICANT CHANGE UP (ref 2.5–4.5)
PLATELET # BLD AUTO: 275 K/UL — SIGNIFICANT CHANGE UP (ref 150–400)
POTASSIUM SERPL-MCNC: 4.9 MMOL/L — SIGNIFICANT CHANGE UP (ref 3.5–5.3)
POTASSIUM SERPL-SCNC: 4.9 MMOL/L — SIGNIFICANT CHANGE UP (ref 3.5–5.3)
PROT SERPL-MCNC: 7 G/DL — SIGNIFICANT CHANGE UP (ref 6–8.3)
RBC # BLD: 2.06 M/UL — LOW (ref 4.2–5.8)
RBC # BLD: 2.06 M/UL — LOW (ref 4.2–5.8)
RBC # FLD: 20.5 % — HIGH (ref 10.3–14.5)
RETICS #: 186.8 K/UL — HIGH (ref 25–125)
RETICS/RBC NFR: 9.3 % — HIGH (ref 0.5–2.5)
SODIUM SERPL-SCNC: 133 MMOL/L — LOW (ref 135–145)
WBC # BLD: 8.32 K/UL — SIGNIFICANT CHANGE UP (ref 3.8–10.5)
WBC # FLD AUTO: 8.32 K/UL — SIGNIFICANT CHANGE UP (ref 3.8–10.5)

## 2024-08-18 PROCEDURE — 99232 SBSQ HOSP IP/OBS MODERATE 35: CPT

## 2024-08-18 RX ORDER — ACETAMINOPHEN 500 MG/5ML
650 LIQUID (ML) ORAL ONCE
Refills: 0 | Status: COMPLETED | OUTPATIENT
Start: 2024-08-18 | End: 2024-08-18

## 2024-08-18 RX ORDER — DIPHENHYDRAMINE HCL 12.5MG/5ML
50 ELIXIR ORAL ONCE
Refills: 0 | Status: COMPLETED | OUTPATIENT
Start: 2024-08-18 | End: 2024-08-18

## 2024-08-18 RX ADMIN — KETOROLAC TROMETHAMINE 30 MILLIGRAM(S): 30 INJECTION, SOLUTION INTRAMUSCULAR; INTRAVENOUS at 12:28

## 2024-08-18 RX ADMIN — KETOROLAC TROMETHAMINE 30 MILLIGRAM(S): 30 INJECTION, SOLUTION INTRAMUSCULAR; INTRAVENOUS at 05:57

## 2024-08-18 RX ADMIN — SODIUM CHLORIDE 75 MILLILITER(S): 9 INJECTION, SOLUTION INTRAVENOUS at 05:57

## 2024-08-18 RX ADMIN — Medication 30 MILLILITER(S): at 11:51

## 2024-08-18 RX ADMIN — Medication 40 MILLIGRAM(S): at 05:57

## 2024-08-18 RX ADMIN — SODIUM CHLORIDE 75 MILLILITER(S): 9 INJECTION, SOLUTION INTRAVENOUS at 13:51

## 2024-08-18 RX ADMIN — Medication 4 MILLIGRAM(S): at 12:28

## 2024-08-18 RX ADMIN — Medication 1 APPLICATION(S): at 12:34

## 2024-08-18 RX ADMIN — KETOROLAC TROMETHAMINE 30 MILLIGRAM(S): 30 INJECTION, SOLUTION INTRAMUSCULAR; INTRAVENOUS at 18:46

## 2024-08-18 RX ADMIN — Medication 4 MILLIGRAM(S): at 18:47

## 2024-08-18 RX ADMIN — FOLIC ACID 1 MILLIGRAM(S): 1 TABLET ORAL at 12:28

## 2024-08-18 RX ADMIN — Medication 30 MILLILITER(S): at 20:05

## 2024-08-18 RX ADMIN — Medication 650 MILLIGRAM(S): at 18:47

## 2024-08-18 RX ADMIN — Medication 30 MILLILITER(S): at 08:34

## 2024-08-18 RX ADMIN — Medication 50 MILLIGRAM(S): at 18:47

## 2024-08-19 DIAGNOSIS — R11.2 NAUSEA WITH VOMITING, UNSPECIFIED: ICD-10-CM

## 2024-08-19 LAB
ALBUMIN SERPL ELPH-MCNC: 3.5 G/DL — SIGNIFICANT CHANGE UP (ref 3.3–5)
ALP SERPL-CCNC: 141 U/L — HIGH (ref 40–120)
ALT FLD-CCNC: 37 U/L — SIGNIFICANT CHANGE UP (ref 4–41)
ANION GAP SERPL CALC-SCNC: 12 MMOL/L — SIGNIFICANT CHANGE UP (ref 7–14)
AST SERPL-CCNC: 110 U/L — HIGH (ref 4–40)
BASOPHILS # BLD AUTO: 0.1 K/UL — SIGNIFICANT CHANGE UP (ref 0–0.2)
BASOPHILS NFR BLD AUTO: 1.1 % — SIGNIFICANT CHANGE UP (ref 0–2)
BILIRUB SERPL-MCNC: 7.3 MG/DL — HIGH (ref 0.2–1.2)
BLD GP AB SCN SERPL QL: NEGATIVE — SIGNIFICANT CHANGE UP
BUN SERPL-MCNC: 34 MG/DL — HIGH (ref 7–23)
CALCIUM SERPL-MCNC: 8.8 MG/DL — SIGNIFICANT CHANGE UP (ref 8.4–10.5)
CHLORIDE SERPL-SCNC: 103 MMOL/L — SIGNIFICANT CHANGE UP (ref 98–107)
CO2 SERPL-SCNC: 17 MMOL/L — LOW (ref 22–31)
CREAT SERPL-MCNC: 1.07 MG/DL — SIGNIFICANT CHANGE UP (ref 0.5–1.3)
EGFR: 87 ML/MIN/1.73M2 — SIGNIFICANT CHANGE UP
EGFR: 87 ML/MIN/1.73M2 — SIGNIFICANT CHANGE UP
EOSINOPHIL # BLD AUTO: 0.54 K/UL — HIGH (ref 0–0.5)
EOSINOPHIL NFR BLD AUTO: 5.9 % — SIGNIFICANT CHANGE UP (ref 0–6)
GLUCOSE SERPL-MCNC: 132 MG/DL — HIGH (ref 70–99)
HCT VFR BLD CALC: 18.5 % — CRITICAL LOW (ref 39–50)
HGB BLD-MCNC: 6.5 G/DL — CRITICAL LOW (ref 13–17)
IANC: 4.43 K/UL — SIGNIFICANT CHANGE UP (ref 1.8–7.4)
IMM GRANULOCYTES NFR BLD AUTO: 0.3 % — SIGNIFICANT CHANGE UP (ref 0–0.9)
LDH SERPL L TO P-CCNC: 1263 U/L — HIGH (ref 135–225)
LYMPHOCYTES # BLD AUTO: 2.53 K/UL — SIGNIFICANT CHANGE UP (ref 1–3.3)
LYMPHOCYTES # BLD AUTO: 27.7 % — SIGNIFICANT CHANGE UP (ref 13–44)
MAGNESIUM SERPL-MCNC: 1.7 MG/DL — SIGNIFICANT CHANGE UP (ref 1.6–2.6)
MCHC RBC-ENTMCNC: 27.9 PG — SIGNIFICANT CHANGE UP (ref 27–34)
MCHC RBC-ENTMCNC: 35.1 GM/DL — SIGNIFICANT CHANGE UP (ref 32–36)
MCV RBC AUTO: 79.4 FL — LOW (ref 80–100)
MONOCYTES # BLD AUTO: 1.49 K/UL — HIGH (ref 0–0.9)
MONOCYTES NFR BLD AUTO: 16.3 % — HIGH (ref 2–14)
NEUTROPHILS # BLD AUTO: 4.43 K/UL — SIGNIFICANT CHANGE UP (ref 1.8–7.4)
NEUTROPHILS NFR BLD AUTO: 48.7 % — SIGNIFICANT CHANGE UP (ref 43–77)
NRBC # BLD AUTO: 0.09 K/UL — HIGH (ref 0–0)
NRBC # BLD: 0 /100 WBCS — SIGNIFICANT CHANGE UP (ref 0–0)
NRBC # FLD: 0.09 K/UL — HIGH (ref 0–0)
NRBC BLD-RTO: 0 /100 WBCS — SIGNIFICANT CHANGE UP (ref 0–0)
PHOSPHATE SERPL-MCNC: 3.3 MG/DL — SIGNIFICANT CHANGE UP (ref 2.5–4.5)
PLATELET # BLD AUTO: 264 K/UL — SIGNIFICANT CHANGE UP (ref 150–400)
POTASSIUM SERPL-MCNC: 4.8 MMOL/L — SIGNIFICANT CHANGE UP (ref 3.5–5.3)
POTASSIUM SERPL-SCNC: 4.8 MMOL/L — SIGNIFICANT CHANGE UP (ref 3.5–5.3)
PROT SERPL-MCNC: 7.1 G/DL — SIGNIFICANT CHANGE UP (ref 6–8.3)
RBC # BLD: 2.33 M/UL — LOW (ref 4.2–5.8)
RBC # BLD: 2.33 M/UL — LOW (ref 4.2–5.8)
RBC # FLD: 19.9 % — HIGH (ref 10.3–14.5)
RETICS #: 173.6 K/UL — HIGH (ref 25–125)
RETICS/RBC NFR: 7.5 % — HIGH (ref 0.5–2.5)
RH IG SCN BLD-IMP: POSITIVE — SIGNIFICANT CHANGE UP
SODIUM SERPL-SCNC: 132 MMOL/L — LOW (ref 135–145)
WBC # BLD: 9.12 K/UL — SIGNIFICANT CHANGE UP (ref 3.8–10.5)
WBC # FLD AUTO: 9.12 K/UL — SIGNIFICANT CHANGE UP (ref 3.8–10.5)

## 2024-08-19 PROCEDURE — 76705 ECHO EXAM OF ABDOMEN: CPT | Mod: 26

## 2024-08-19 PROCEDURE — 99233 SBSQ HOSP IP/OBS HIGH 50: CPT

## 2024-08-19 RX ORDER — ENOXAPARIN SODIUM 100 MG/ML
40 INJECTION SUBCUTANEOUS EVERY 24 HOURS
Refills: 0 | Status: DISCONTINUED | OUTPATIENT
Start: 2024-08-19 | End: 2024-08-24

## 2024-08-19 RX ADMIN — Medication 4 MILLIGRAM(S): at 09:16

## 2024-08-19 RX ADMIN — ENOXAPARIN SODIUM 40 MILLIGRAM(S): 100 INJECTION SUBCUTANEOUS at 13:26

## 2024-08-19 RX ADMIN — Medication 40 MILLIGRAM(S): at 13:30

## 2024-08-19 RX ADMIN — Medication 40 MILLIGRAM(S): at 05:31

## 2024-08-19 RX ADMIN — KETOROLAC TROMETHAMINE 30 MILLIGRAM(S): 30 INJECTION, SOLUTION INTRAMUSCULAR; INTRAVENOUS at 05:31

## 2024-08-19 RX ADMIN — FOLIC ACID 1 MILLIGRAM(S): 1 TABLET ORAL at 13:21

## 2024-08-19 RX ADMIN — SODIUM CHLORIDE 75 MILLILITER(S): 9 INJECTION, SOLUTION INTRAVENOUS at 00:19

## 2024-08-19 RX ADMIN — Medication 1 APPLICATION(S): at 13:32

## 2024-08-19 RX ADMIN — Medication 30 MILLILITER(S): at 19:49

## 2024-08-19 RX ADMIN — Medication 30 MILLILITER(S): at 07:41

## 2024-08-19 RX ADMIN — KETOROLAC TROMETHAMINE 30 MILLIGRAM(S): 30 INJECTION, SOLUTION INTRAMUSCULAR; INTRAVENOUS at 00:19

## 2024-08-20 DIAGNOSIS — K74.60 UNSPECIFIED CIRRHOSIS OF LIVER: ICD-10-CM

## 2024-08-20 LAB
ADD ON TEST-SPECIMEN IN LAB: SIGNIFICANT CHANGE UP
ADD ON TEST-SPECIMEN IN LAB: SIGNIFICANT CHANGE UP
ALBUMIN SERPL ELPH-MCNC: 3.4 G/DL — SIGNIFICANT CHANGE UP (ref 3.3–5)
ALP SERPL-CCNC: 129 U/L — HIGH (ref 40–120)
ALT FLD-CCNC: 33 U/L — SIGNIFICANT CHANGE UP (ref 4–41)
ANION GAP SERPL CALC-SCNC: 13 MMOL/L — SIGNIFICANT CHANGE UP (ref 7–14)
AST SERPL-CCNC: 107 U/L — HIGH (ref 4–40)
BASOPHILS # BLD AUTO: 0.14 K/UL — SIGNIFICANT CHANGE UP (ref 0–0.2)
BASOPHILS NFR BLD AUTO: 1.2 % — SIGNIFICANT CHANGE UP (ref 0–2)
BILIRUB DIRECT SERPL-MCNC: 4.4 MG/DL — HIGH (ref 0–0.3)
BILIRUB SERPL-MCNC: 8.3 MG/DL — HIGH (ref 0.2–1.2)
BLD GP AB SCN SERPL QL: NEGATIVE — SIGNIFICANT CHANGE UP
BUN SERPL-MCNC: 30 MG/DL — HIGH (ref 7–23)
CALCIUM SERPL-MCNC: 9 MG/DL — SIGNIFICANT CHANGE UP (ref 8.4–10.5)
CHLORIDE SERPL-SCNC: 105 MMOL/L — SIGNIFICANT CHANGE UP (ref 98–107)
CO2 SERPL-SCNC: 18 MMOL/L — LOW (ref 22–31)
CREAT SERPL-MCNC: 0.94 MG/DL — SIGNIFICANT CHANGE UP (ref 0.5–1.3)
EGFR: 102 ML/MIN/1.73M2 — SIGNIFICANT CHANGE UP
EGFR: 102 ML/MIN/1.73M2 — SIGNIFICANT CHANGE UP
EOSINOPHIL # BLD AUTO: 0.37 K/UL — SIGNIFICANT CHANGE UP (ref 0–0.5)
EOSINOPHIL NFR BLD AUTO: 3.2 % — SIGNIFICANT CHANGE UP (ref 0–6)
GLUCOSE SERPL-MCNC: 112 MG/DL — HIGH (ref 70–99)
HCT VFR BLD CALC: 18.3 % — CRITICAL LOW (ref 39–50)
HGB BLD-MCNC: 6.4 G/DL — CRITICAL LOW (ref 13–17)
IANC: 4.41 K/UL — SIGNIFICANT CHANGE UP (ref 1.8–7.4)
IMM GRANULOCYTES NFR BLD AUTO: 0.4 % — SIGNIFICANT CHANGE UP (ref 0–0.9)
LDH SERPL L TO P-CCNC: 1202 U/L — HIGH (ref 135–225)
LYMPHOCYTES # BLD AUTO: 4.69 K/UL — HIGH (ref 1–3.3)
LYMPHOCYTES # BLD AUTO: 40.7 % — SIGNIFICANT CHANGE UP (ref 13–44)
MAGNESIUM SERPL-MCNC: 1.6 MG/DL — SIGNIFICANT CHANGE UP (ref 1.6–2.6)
MCHC RBC-ENTMCNC: 28.1 PG — SIGNIFICANT CHANGE UP (ref 27–34)
MCHC RBC-ENTMCNC: 35 GM/DL — SIGNIFICANT CHANGE UP (ref 32–36)
MCV RBC AUTO: 80.3 FL — SIGNIFICANT CHANGE UP (ref 80–100)
MONOCYTES # BLD AUTO: 1.85 K/UL — HIGH (ref 0–0.9)
MONOCYTES NFR BLD AUTO: 16.1 % — HIGH (ref 2–14)
NEUTROPHILS # BLD AUTO: 4.41 K/UL — SIGNIFICANT CHANGE UP (ref 1.8–7.4)
NEUTROPHILS NFR BLD AUTO: 38.4 % — LOW (ref 43–77)
NRBC # BLD AUTO: 0.05 K/UL — HIGH (ref 0–0)
NRBC # BLD: 0 /100 WBCS — SIGNIFICANT CHANGE UP (ref 0–0)
NRBC # FLD: 0.05 K/UL — HIGH (ref 0–0)
NRBC BLD-RTO: 0 /100 WBCS — SIGNIFICANT CHANGE UP (ref 0–0)
NT-PROBNP SERPL-SCNC: 8376 PG/ML — HIGH
PHOSPHATE SERPL-MCNC: 2.8 MG/DL — SIGNIFICANT CHANGE UP (ref 2.5–4.5)
PLATELET # BLD AUTO: 290 K/UL — SIGNIFICANT CHANGE UP (ref 150–400)
POTASSIUM SERPL-MCNC: 4.6 MMOL/L — SIGNIFICANT CHANGE UP (ref 3.5–5.3)
POTASSIUM SERPL-SCNC: 4.6 MMOL/L — SIGNIFICANT CHANGE UP (ref 3.5–5.3)
PROT SERPL-MCNC: 6.8 G/DL — SIGNIFICANT CHANGE UP (ref 6–8.3)
RBC # BLD: 2.28 M/UL — LOW (ref 4.2–5.8)
RBC # BLD: 2.28 M/UL — LOW (ref 4.2–5.8)
RBC # FLD: 20.6 % — HIGH (ref 10.3–14.5)
RETICS #: 155 K/UL — HIGH (ref 25–125)
RETICS/RBC NFR: 6.9 % — HIGH (ref 0.5–2.5)
RH IG SCN BLD-IMP: POSITIVE — SIGNIFICANT CHANGE UP
SODIUM SERPL-SCNC: 136 MMOL/L — SIGNIFICANT CHANGE UP (ref 135–145)
WBC # BLD: 11.51 K/UL — HIGH (ref 3.8–10.5)
WBC # FLD AUTO: 11.51 K/UL — HIGH (ref 3.8–10.5)

## 2024-08-20 PROCEDURE — 71045 X-RAY EXAM CHEST 1 VIEW: CPT | Mod: 26

## 2024-08-20 PROCEDURE — 99233 SBSQ HOSP IP/OBS HIGH 50: CPT

## 2024-08-20 RX ORDER — FUROSEMIDE 10 MG/ML
20 INJECTION INTRAMUSCULAR; INTRAVENOUS ONCE
Refills: 0 | Status: COMPLETED | OUTPATIENT
Start: 2024-08-20 | End: 2024-08-20

## 2024-08-20 RX ORDER — SODIUM CHLORIDE 9 G/1000ML
1000 INJECTION, SOLUTION INTRAVENOUS
Refills: 0 | Status: DISCONTINUED | OUTPATIENT
Start: 2024-08-20 | End: 2024-08-24

## 2024-08-20 RX ORDER — ACETAMINOPHEN 500 MG/5ML
650 LIQUID (ML) ORAL ONCE
Refills: 0 | Status: COMPLETED | OUTPATIENT
Start: 2024-08-20 | End: 2024-08-20

## 2024-08-20 RX ADMIN — Medication 30 MILLILITER(S): at 07:46

## 2024-08-20 RX ADMIN — FOLIC ACID 1 MILLIGRAM(S): 1 TABLET ORAL at 11:44

## 2024-08-20 RX ADMIN — FUROSEMIDE 20 MILLIGRAM(S): 10 INJECTION INTRAMUSCULAR; INTRAVENOUS at 14:26

## 2024-08-20 RX ADMIN — Medication 40 MILLIGRAM(S): at 11:44

## 2024-08-20 RX ADMIN — Medication 40 MILLIGRAM(S): at 06:07

## 2024-08-20 RX ADMIN — Medication 650 MILLIGRAM(S): at 00:44

## 2024-08-20 RX ADMIN — POLYETHYLENE GLYCOL 3350 17 GRAM(S): 17 POWDER, FOR SOLUTION ORAL at 11:44

## 2024-08-20 RX ADMIN — Medication 30 MILLILITER(S): at 19:33

## 2024-08-20 RX ADMIN — Medication 1 APPLICATION(S): at 11:46

## 2024-08-20 RX ADMIN — ENOXAPARIN SODIUM 40 MILLIGRAM(S): 100 INJECTION SUBCUTANEOUS at 18:51

## 2024-08-20 RX ADMIN — SODIUM CHLORIDE 75 MILLILITER(S): 9 INJECTION, SOLUTION INTRAVENOUS at 06:07

## 2024-08-21 LAB
ALBUMIN SERPL ELPH-MCNC: 3.4 G/DL — SIGNIFICANT CHANGE UP (ref 3.3–5)
ALP SERPL-CCNC: 131 U/L — HIGH (ref 40–120)
ALT FLD-CCNC: 40 U/L — SIGNIFICANT CHANGE UP (ref 4–41)
ANION GAP SERPL CALC-SCNC: 12 MMOL/L — SIGNIFICANT CHANGE UP (ref 7–14)
ANISOCYTOSIS BLD QL: SIGNIFICANT CHANGE UP
AST SERPL-CCNC: 99 U/L — HIGH (ref 4–40)
BASOPHILS # BLD AUTO: 0 K/UL — SIGNIFICANT CHANGE UP (ref 0–0.2)
BASOPHILS NFR BLD AUTO: 0 % — SIGNIFICANT CHANGE UP (ref 0–2)
BILIRUB DIRECT SERPL-MCNC: 4.4 MG/DL — HIGH (ref 0–0.3)
BILIRUB INDIRECT FLD-MCNC: 3.7 MG/DL — HIGH (ref 0–1)
BILIRUB SERPL-MCNC: 8.1 MG/DL — HIGH (ref 0.2–1.2)
BILIRUB SERPL-MCNC: 8.1 MG/DL — HIGH (ref 0.2–1.2)
BLD GP AB SCN SERPL QL: NEGATIVE — SIGNIFICANT CHANGE UP
BUN SERPL-MCNC: 27 MG/DL — HIGH (ref 7–23)
CALCIUM SERPL-MCNC: 9.4 MG/DL — SIGNIFICANT CHANGE UP (ref 8.4–10.5)
CHLORIDE SERPL-SCNC: 103 MMOL/L — SIGNIFICANT CHANGE UP (ref 98–107)
CO2 SERPL-SCNC: 22 MMOL/L — SIGNIFICANT CHANGE UP (ref 22–31)
CREAT SERPL-MCNC: 0.91 MG/DL — SIGNIFICANT CHANGE UP (ref 0.5–1.3)
EGFR: 106 ML/MIN/1.73M2 — SIGNIFICANT CHANGE UP
EGFR: 106 ML/MIN/1.73M2 — SIGNIFICANT CHANGE UP
EOSINOPHIL # BLD AUTO: 0.66 K/UL — HIGH (ref 0–0.5)
EOSINOPHIL NFR BLD AUTO: 6.1 % — HIGH (ref 0–6)
GLUCOSE SERPL-MCNC: 130 MG/DL — HIGH (ref 70–99)
HAV IGM SER-ACNC: SIGNIFICANT CHANGE UP
HBV CORE AB SER-ACNC: SIGNIFICANT CHANGE UP
HBV CORE IGM SER-ACNC: SIGNIFICANT CHANGE UP
HBV DNA # SERPL NAA+PROBE: SIGNIFICANT CHANGE UP
HBV DNA SERPL NAA+PROBE-LOG#: SIGNIFICANT CHANGE UP LOGIU/ML
HBV SURFACE AB SER-ACNC: 18.1 MIU/ML — SIGNIFICANT CHANGE UP
HBV SURFACE AG SER-ACNC: SIGNIFICANT CHANGE UP
HBV SURFACE AG SER-ACNC: SIGNIFICANT CHANGE UP
HCT VFR BLD CALC: 17.6 % — CRITICAL LOW (ref 39–50)
HCV AB S/CO SERPL IA: 0.13 S/CO — SIGNIFICANT CHANGE UP (ref 0–0.99)
HCV AB S/CO SERPL IA: 0.13 S/CO — SIGNIFICANT CHANGE UP (ref 0–0.99)
HCV AB SERPL-IMP: SIGNIFICANT CHANGE UP
HCV AB SERPL-IMP: SIGNIFICANT CHANGE UP
HEMOGLOBIN INTERPRETATION: SIGNIFICANT CHANGE UP
HGB A MFR BLD: 74.2 % — LOW (ref 95–97.6)
HGB A2 MFR BLD: 2.6 % — SIGNIFICANT CHANGE UP (ref 2.4–3.5)
HGB BLD-MCNC: 6.2 G/DL — CRITICAL LOW (ref 13–17)
HGB F MFR BLD: 2 % — HIGH (ref 0–1.5)
HGB S MFR BLD: 21.2 % — HIGH
IANC: 4.87 K/UL — SIGNIFICANT CHANGE UP (ref 1.8–7.4)
IGG FLD-MCNC: 1570 MG/DL — SIGNIFICANT CHANGE UP (ref 610–1660)
IGG1 SER-MCNC: 861 MG/DL — SIGNIFICANT CHANGE UP (ref 240–1118)
IGG2 SER-MCNC: 380 MG/DL — SIGNIFICANT CHANGE UP (ref 124–549)
IGG3 SER-MCNC: 37.1 MG/DL — SIGNIFICANT CHANGE UP (ref 15–102)
IGG4 SER-MCNC: 210.2 MG/DL — HIGH (ref 1–123)
INR BLD: 1.19 RATIO — HIGH (ref 0.85–1.18)
LDH SERPL L TO P-CCNC: 1202 U/L — HIGH (ref 135–225)
LYMPHOCYTES # BLD AUTO: 38.2 % — SIGNIFICANT CHANGE UP (ref 13–44)
LYMPHOCYTES # BLD AUTO: 4.11 K/UL — HIGH (ref 1–3.3)
MACROCYTES BLD QL: SLIGHT — SIGNIFICANT CHANGE UP
MAGNESIUM SERPL-MCNC: 1.4 MG/DL — LOW (ref 1.6–2.6)
MANUAL SMEAR VERIFICATION: SIGNIFICANT CHANGE UP
MCHC RBC-ENTMCNC: 28.2 PG — SIGNIFICANT CHANGE UP (ref 27–34)
MCHC RBC-ENTMCNC: 35.2 GM/DL — SIGNIFICANT CHANGE UP (ref 32–36)
MCV RBC AUTO: 80 FL — SIGNIFICANT CHANGE UP (ref 80–100)
MELD SCORE WITH DIALYSIS: SIGNIFICANT CHANGE UP POINTS
MELD SCORE WITHOUT DIALYSIS: SIGNIFICANT CHANGE UP POINTS
MICROCYTES BLD QL: SLIGHT — SIGNIFICANT CHANGE UP
MONOCYTES # BLD AUTO: 1.22 K/UL — HIGH (ref 0–0.9)
MONOCYTES NFR BLD AUTO: 11.3 % — SIGNIFICANT CHANGE UP (ref 2–14)
NEUTROPHILS # BLD AUTO: 4.68 K/UL — SIGNIFICANT CHANGE UP (ref 1.8–7.4)
NEUTROPHILS NFR BLD AUTO: 43.5 % — SIGNIFICANT CHANGE UP (ref 43–77)
PHOSPHATE SERPL-MCNC: 2.9 MG/DL — SIGNIFICANT CHANGE UP (ref 2.5–4.5)
PLAT MORPH BLD: NORMAL — SIGNIFICANT CHANGE UP
PLATELET # BLD AUTO: 305 K/UL — SIGNIFICANT CHANGE UP (ref 150–400)
PLATELET COUNT - ESTIMATE: NORMAL — SIGNIFICANT CHANGE UP
POIKILOCYTOSIS BLD QL AUTO: SIGNIFICANT CHANGE UP
POLYCHROMASIA BLD QL SMEAR: SLIGHT — SIGNIFICANT CHANGE UP
POTASSIUM SERPL-MCNC: 4.1 MMOL/L — SIGNIFICANT CHANGE UP (ref 3.5–5.3)
POTASSIUM SERPL-SCNC: 4.1 MMOL/L — SIGNIFICANT CHANGE UP (ref 3.5–5.3)
PROT SERPL-MCNC: 7.1 G/DL — SIGNIFICANT CHANGE UP (ref 6–8.3)
PROTHROM AB SERPL-ACNC: 13.3 SEC — HIGH (ref 9.5–13)
RBC # BLD: 2.2 M/UL — LOW (ref 4.2–5.8)
RBC # BLD: 2.2 M/UL — LOW (ref 4.2–5.8)
RBC # FLD: 21.4 % — HIGH (ref 10.3–14.5)
RBC BLD AUTO: ABNORMAL
RETICS #: 165.7 K/UL — HIGH (ref 25–125)
RETICS/RBC NFR: 7.5 % — HIGH (ref 0.5–2.5)
RH IG SCN BLD-IMP: POSITIVE — SIGNIFICANT CHANGE UP
SICKLE CELLS BLD QL SMEAR: SIGNIFICANT CHANGE UP
SMUDGE CELLS # BLD: PRESENT — SIGNIFICANT CHANGE UP
SODIUM SERPL-SCNC: 137 MMOL/L — SIGNIFICANT CHANGE UP (ref 135–145)
VARIANT LYMPHS # BLD: 0.9 % — SIGNIFICANT CHANGE UP (ref 0–6)
VARIANT LYMPHS NFR BLD MANUAL: 0.9 % — SIGNIFICANT CHANGE UP (ref 0–6)
WBC # BLD: 10.77 K/UL — HIGH (ref 3.8–10.5)
WBC # FLD AUTO: 10.77 K/UL — HIGH (ref 3.8–10.5)

## 2024-08-21 PROCEDURE — 99233 SBSQ HOSP IP/OBS HIGH 50: CPT

## 2024-08-21 RX ORDER — MAGNESIUM SULFATE 500 MG/ML
1 SYRINGE (ML) INJECTION ONCE
Refills: 0 | Status: COMPLETED | OUTPATIENT
Start: 2024-08-21 | End: 2024-08-21

## 2024-08-21 RX ORDER — FUROSEMIDE 10 MG/ML
40 INJECTION INTRAMUSCULAR; INTRAVENOUS ONCE
Refills: 0 | Status: COMPLETED | OUTPATIENT
Start: 2024-08-21 | End: 2024-08-21

## 2024-08-21 RX ADMIN — Medication 40 MILLIGRAM(S): at 14:09

## 2024-08-21 RX ADMIN — Medication 30 MILLILITER(S): at 02:39

## 2024-08-21 RX ADMIN — Medication 1 APPLICATION(S): at 14:08

## 2024-08-21 RX ADMIN — Medication 30 MILLILITER(S): at 19:33

## 2024-08-21 RX ADMIN — Medication 4 MILLIGRAM(S): at 17:54

## 2024-08-21 RX ADMIN — ENOXAPARIN SODIUM 40 MILLIGRAM(S): 100 INJECTION SUBCUTANEOUS at 14:09

## 2024-08-21 RX ADMIN — Medication 4 MILLIGRAM(S): at 03:30

## 2024-08-21 RX ADMIN — FUROSEMIDE 40 MILLIGRAM(S): 10 INJECTION INTRAMUSCULAR; INTRAVENOUS at 14:16

## 2024-08-21 RX ADMIN — Medication 100 GRAM(S): at 14:16

## 2024-08-21 RX ADMIN — Medication 40 MILLIGRAM(S): at 05:57

## 2024-08-21 RX ADMIN — Medication 30 MILLILITER(S): at 07:52

## 2024-08-21 RX ADMIN — FOLIC ACID 1 MILLIGRAM(S): 1 TABLET ORAL at 14:09

## 2024-08-22 ENCOUNTER — RESULT REVIEW (OUTPATIENT)
Age: 44
End: 2024-08-22

## 2024-08-22 LAB
ALBUMIN SERPL ELPH-MCNC: 3.5 G/DL — SIGNIFICANT CHANGE UP (ref 3.3–5)
ALP SERPL-CCNC: 133 U/L — HIGH (ref 40–120)
ALT FLD-CCNC: 31 U/L — SIGNIFICANT CHANGE UP (ref 4–41)
ANA PAT FLD IF-IMP: ABNORMAL
ANA TITR SER: ABNORMAL
ANION GAP SERPL CALC-SCNC: 13 MMOL/L — SIGNIFICANT CHANGE UP (ref 7–14)
APPEARANCE UR: CLEAR — SIGNIFICANT CHANGE UP
AST SERPL-CCNC: 86 U/L — HIGH (ref 4–40)
BACTERIA # UR AUTO: NEGATIVE /HPF — SIGNIFICANT CHANGE UP
BASOPHILS # BLD AUTO: 0.11 K/UL — SIGNIFICANT CHANGE UP (ref 0–0.2)
BASOPHILS NFR BLD AUTO: 1 % — SIGNIFICANT CHANGE UP (ref 0–2)
BILIRUB DIRECT SERPL-MCNC: 3.2 MG/DL — HIGH (ref 0–0.3)
BILIRUB INDIRECT FLD-MCNC: 2.8 MG/DL — HIGH (ref 0–1)
BILIRUB SERPL-MCNC: 6 MG/DL — HIGH (ref 0.2–1.2)
BILIRUB SERPL-MCNC: 6 MG/DL — HIGH (ref 0.2–1.2)
BILIRUB UR-MCNC: ABNORMAL
BUN SERPL-MCNC: 22 MG/DL — SIGNIFICANT CHANGE UP (ref 7–23)
CALCIUM SERPL-MCNC: 9.2 MG/DL — SIGNIFICANT CHANGE UP (ref 8.4–10.5)
CAST: 4 /LPF — SIGNIFICANT CHANGE UP (ref 0–4)
CHLORIDE SERPL-SCNC: 102 MMOL/L — SIGNIFICANT CHANGE UP (ref 98–107)
CO2 SERPL-SCNC: 22 MMOL/L — SIGNIFICANT CHANGE UP (ref 22–31)
COLOR SPEC: SIGNIFICANT CHANGE UP
CREAT SERPL-MCNC: 0.9 MG/DL — SIGNIFICANT CHANGE UP (ref 0.5–1.3)
DIFF PNL FLD: ABNORMAL
EGFR: 107 ML/MIN/1.73M2 — SIGNIFICANT CHANGE UP
EGFR: 107 ML/MIN/1.73M2 — SIGNIFICANT CHANGE UP
EOSINOPHIL # BLD AUTO: 1.06 K/UL — HIGH (ref 0–0.5)
EOSINOPHIL NFR BLD AUTO: 10.1 % — HIGH (ref 0–6)
GLUCOSE SERPL-MCNC: 109 MG/DL — HIGH (ref 70–99)
GLUCOSE UR QL: NEGATIVE MG/DL — SIGNIFICANT CHANGE UP
HCT VFR BLD CALC: 17.7 % — CRITICAL LOW (ref 39–50)
HGB BLD-MCNC: 6.1 G/DL — CRITICAL LOW (ref 13–17)
IANC: 4.63 K/UL — SIGNIFICANT CHANGE UP (ref 1.8–7.4)
IMM GRANULOCYTES NFR BLD AUTO: 0.5 % — SIGNIFICANT CHANGE UP (ref 0–0.9)
KETONES UR-MCNC: NEGATIVE MG/DL — SIGNIFICANT CHANGE UP
LEUKOCYTE ESTERASE UR-ACNC: ABNORMAL
LYMPHOCYTES # BLD AUTO: 3.35 K/UL — HIGH (ref 1–3.3)
LYMPHOCYTES # BLD AUTO: 31.8 % — SIGNIFICANT CHANGE UP (ref 13–44)
MAGNESIUM SERPL-MCNC: 1.4 MG/DL — LOW (ref 1.6–2.6)
MCHC RBC-ENTMCNC: 27.9 PG — SIGNIFICANT CHANGE UP (ref 27–34)
MCHC RBC-ENTMCNC: 34.5 GM/DL — SIGNIFICANT CHANGE UP (ref 32–36)
MCV RBC AUTO: 80.8 FL — SIGNIFICANT CHANGE UP (ref 80–100)
MONOCYTES # BLD AUTO: 1.33 K/UL — HIGH (ref 0–0.9)
MONOCYTES NFR BLD AUTO: 12.6 % — SIGNIFICANT CHANGE UP (ref 2–14)
NEUTROPHILS # BLD AUTO: 4.63 K/UL — SIGNIFICANT CHANGE UP (ref 1.8–7.4)
NEUTROPHILS NFR BLD AUTO: 44 % — SIGNIFICANT CHANGE UP (ref 43–77)
NITRITE UR-MCNC: POSITIVE
NRBC # BLD AUTO: 0.13 K/UL — HIGH (ref 0–0)
NRBC # BLD: 1 /100 WBCS — HIGH (ref 0–0)
NRBC # FLD: 0.13 K/UL — HIGH (ref 0–0)
NRBC BLD-RTO: 1 /100 WBCS — HIGH (ref 0–0)
PH UR: 6 — SIGNIFICANT CHANGE UP (ref 5–8)
PHOSPHATE SERPL-MCNC: 3.6 MG/DL — SIGNIFICANT CHANGE UP (ref 2.5–4.5)
PLATELET # BLD AUTO: 321 K/UL — SIGNIFICANT CHANGE UP (ref 150–400)
POTASSIUM SERPL-MCNC: 3.9 MMOL/L — SIGNIFICANT CHANGE UP (ref 3.5–5.3)
POTASSIUM SERPL-SCNC: 3.9 MMOL/L — SIGNIFICANT CHANGE UP (ref 3.5–5.3)
PROT SERPL-MCNC: 7 G/DL — SIGNIFICANT CHANGE UP (ref 6–8.3)
PROT UR-MCNC: 300 MG/DL
RBC # BLD: 2.19 M/UL — LOW (ref 4.2–5.8)
RBC # BLD: 2.19 M/UL — LOW (ref 4.2–5.8)
RBC # FLD: 21.6 % — HIGH (ref 10.3–14.5)
RBC CASTS # UR COMP ASSIST: 9 /HPF — HIGH (ref 0–4)
RETICS #: 190.5 K/UL — HIGH (ref 25–125)
RETICS/RBC NFR: 8.7 % — HIGH (ref 0.5–2.5)
REVIEW: SIGNIFICANT CHANGE UP
SMOOTH MUSCLE AB SER-ACNC: SIGNIFICANT CHANGE UP
SODIUM SERPL-SCNC: 137 MMOL/L — SIGNIFICANT CHANGE UP (ref 135–145)
SP GR SPEC: 1.01 — SIGNIFICANT CHANGE UP (ref 1–1.03)
SQUAMOUS # UR AUTO: 1 /HPF — SIGNIFICANT CHANGE UP (ref 0–5)
UROBILINOGEN FLD QL: 4 MG/DL (ref 0.2–1)
WBC # BLD: 10.53 K/UL — HIGH (ref 3.8–10.5)
WBC # FLD AUTO: 10.53 K/UL — HIGH (ref 3.8–10.5)
WBC UR QL: 0 /HPF — SIGNIFICANT CHANGE UP (ref 0–5)

## 2024-08-22 PROCEDURE — 99233 SBSQ HOSP IP/OBS HIGH 50: CPT

## 2024-08-22 PROCEDURE — 93306 TTE W/DOPPLER COMPLETE: CPT | Mod: 26

## 2024-08-22 RX ORDER — MAGNESIUM SULFATE 500 MG/ML
1 SYRINGE (ML) INJECTION ONCE
Refills: 0 | Status: COMPLETED | OUTPATIENT
Start: 2024-08-22 | End: 2024-08-22

## 2024-08-22 RX ORDER — HYDROMORPHONE/SOD CHLOR,ISO/PF 2 MG/10 ML
30 SYRINGE (ML) INJECTION
Refills: 0 | Status: DISCONTINUED | OUTPATIENT
Start: 2024-08-22 | End: 2024-08-24

## 2024-08-22 RX ORDER — FUROSEMIDE 10 MG/ML
20 INJECTION INTRAMUSCULAR; INTRAVENOUS DAILY
Refills: 0 | Status: DISCONTINUED | OUTPATIENT
Start: 2024-08-22 | End: 2024-08-23

## 2024-08-22 RX ORDER — SPIRONOLACTONE 25 MG
50 TABLET ORAL DAILY
Refills: 0 | Status: DISCONTINUED | OUTPATIENT
Start: 2024-08-22 | End: 2024-08-23

## 2024-08-22 RX ADMIN — SODIUM CHLORIDE 30 MILLILITER(S): 9 INJECTION, SOLUTION INTRAVENOUS at 05:12

## 2024-08-22 RX ADMIN — Medication 40 MILLIGRAM(S): at 05:12

## 2024-08-22 RX ADMIN — Medication 30 MILLILITER(S): at 07:25

## 2024-08-22 RX ADMIN — Medication 100 GRAM(S): at 10:50

## 2024-08-22 RX ADMIN — Medication 1 APPLICATION(S): at 12:36

## 2024-08-22 RX ADMIN — Medication 50 MILLIGRAM(S): at 13:18

## 2024-08-22 RX ADMIN — FUROSEMIDE 20 MILLIGRAM(S): 10 INJECTION INTRAMUSCULAR; INTRAVENOUS at 12:35

## 2024-08-22 RX ADMIN — Medication 30 MILLILITER(S): at 19:24

## 2024-08-22 RX ADMIN — FOLIC ACID 1 MILLIGRAM(S): 1 TABLET ORAL at 12:37

## 2024-08-22 RX ADMIN — Medication 30 MILLILITER(S): at 14:11

## 2024-08-23 LAB
ADD ON TEST-SPECIMEN IN LAB: SIGNIFICANT CHANGE UP
ALBUMIN SERPL ELPH-MCNC: 3.4 G/DL — SIGNIFICANT CHANGE UP (ref 3.3–5)
ALP SERPL-CCNC: 132 U/L — HIGH (ref 40–120)
ALT FLD-CCNC: 34 U/L — SIGNIFICANT CHANGE UP (ref 4–41)
ANION GAP SERPL CALC-SCNC: 14 MMOL/L — SIGNIFICANT CHANGE UP (ref 7–14)
AST SERPL-CCNC: 78 U/L — HIGH (ref 4–40)
BASOPHILS # BLD AUTO: 0.12 K/UL — SIGNIFICANT CHANGE UP (ref 0–0.2)
BASOPHILS NFR BLD AUTO: 1 % — SIGNIFICANT CHANGE UP (ref 0–2)
BILIRUB SERPL-MCNC: 5 MG/DL — HIGH (ref 0.2–1.2)
BUN SERPL-MCNC: 20 MG/DL — SIGNIFICANT CHANGE UP (ref 7–23)
CALCIUM SERPL-MCNC: 8.8 MG/DL — SIGNIFICANT CHANGE UP (ref 8.4–10.5)
CHLORIDE SERPL-SCNC: 102 MMOL/L — SIGNIFICANT CHANGE UP (ref 98–107)
CO2 SERPL-SCNC: 24 MMOL/L — SIGNIFICANT CHANGE UP (ref 22–31)
CREAT SERPL-MCNC: 0.88 MG/DL — SIGNIFICANT CHANGE UP (ref 0.5–1.3)
EGFR: 108 ML/MIN/1.73M2 — SIGNIFICANT CHANGE UP
EGFR: 108 ML/MIN/1.73M2 — SIGNIFICANT CHANGE UP
EOSINOPHIL # BLD AUTO: 1.18 K/UL — HIGH (ref 0–0.5)
EOSINOPHIL NFR BLD AUTO: 10.2 % — HIGH (ref 0–6)
GLUCOSE SERPL-MCNC: 99 MG/DL — SIGNIFICANT CHANGE UP (ref 70–99)
HCT VFR BLD CALC: 17.5 % — CRITICAL LOW (ref 39–50)
HEV IGM SER QL: NEGATIVE — SIGNIFICANT CHANGE UP
HGB BLD-MCNC: 6 G/DL — CRITICAL LOW (ref 13–17)
IANC: 5.08 K/UL — SIGNIFICANT CHANGE UP (ref 1.8–7.4)
IMM GRANULOCYTES NFR BLD AUTO: 0.8 % — SIGNIFICANT CHANGE UP (ref 0–0.9)
LKM AB SER-ACNC: <20.1 UNITS — SIGNIFICANT CHANGE UP (ref 0–20)
LYMPHOCYTES # BLD AUTO: 3.8 K/UL — HIGH (ref 1–3.3)
LYMPHOCYTES # BLD AUTO: 32.9 % — SIGNIFICANT CHANGE UP (ref 13–44)
MAGNESIUM SERPL-MCNC: 1.5 MG/DL — LOW (ref 1.6–2.6)
MCHC RBC-ENTMCNC: 27.8 PG — SIGNIFICANT CHANGE UP (ref 27–34)
MCHC RBC-ENTMCNC: 34.3 GM/DL — SIGNIFICANT CHANGE UP (ref 32–36)
MCV RBC AUTO: 81 FL — SIGNIFICANT CHANGE UP (ref 80–100)
MONOCYTES # BLD AUTO: 1.27 K/UL — HIGH (ref 0–0.9)
MONOCYTES NFR BLD AUTO: 11 % — SIGNIFICANT CHANGE UP (ref 2–14)
NEUTROPHILS # BLD AUTO: 5.08 K/UL — SIGNIFICANT CHANGE UP (ref 1.8–7.4)
NEUTROPHILS NFR BLD AUTO: 44.1 % — SIGNIFICANT CHANGE UP (ref 43–77)
NRBC # BLD AUTO: 0.15 K/UL — HIGH (ref 0–0)
NRBC # BLD: 1 /100 WBCS — HIGH (ref 0–0)
NRBC # FLD: 0.15 K/UL — HIGH (ref 0–0)
NRBC BLD-RTO: 1 /100 WBCS — HIGH (ref 0–0)
PHOSPHATE SERPL-MCNC: 4.1 MG/DL — SIGNIFICANT CHANGE UP (ref 2.5–4.5)
PLATELET # BLD AUTO: 342 K/UL — SIGNIFICANT CHANGE UP (ref 150–400)
POTASSIUM SERPL-MCNC: 3.9 MMOL/L — SIGNIFICANT CHANGE UP (ref 3.5–5.3)
POTASSIUM SERPL-SCNC: 3.9 MMOL/L — SIGNIFICANT CHANGE UP (ref 3.5–5.3)
PROT SERPL-MCNC: 6.8 G/DL — SIGNIFICANT CHANGE UP (ref 6–8.3)
RBC # BLD: 2.16 M/UL — LOW (ref 4.2–5.8)
RBC # FLD: 21.4 % — HIGH (ref 10.3–14.5)
SODIUM SERPL-SCNC: 140 MMOL/L — SIGNIFICANT CHANGE UP (ref 135–145)
URATE SERPL-MCNC: 8.8 MG/DL — SIGNIFICANT CHANGE UP (ref 3.4–8.8)
WBC # BLD: 11.54 K/UL — HIGH (ref 3.8–10.5)
WBC # FLD AUTO: 11.54 K/UL — HIGH (ref 3.8–10.5)

## 2024-08-23 PROCEDURE — 99233 SBSQ HOSP IP/OBS HIGH 50: CPT

## 2024-08-23 RX ORDER — SPIRONOLACTONE 25 MG
100 TABLET ORAL DAILY
Refills: 0 | Status: DISCONTINUED | OUTPATIENT
Start: 2024-08-24 | End: 2024-08-24

## 2024-08-23 RX ORDER — FUROSEMIDE 10 MG/ML
40 INJECTION INTRAMUSCULAR; INTRAVENOUS DAILY
Refills: 0 | Status: DISCONTINUED | OUTPATIENT
Start: 2024-08-24 | End: 2024-08-24

## 2024-08-23 RX ORDER — SPIRONOLACTONE 25 MG
50 TABLET ORAL ONCE
Refills: 0 | Status: COMPLETED | OUTPATIENT
Start: 2024-08-23 | End: 2024-08-23

## 2024-08-23 RX ORDER — FUROSEMIDE 10 MG/ML
20 INJECTION INTRAMUSCULAR; INTRAVENOUS ONCE
Refills: 0 | Status: COMPLETED | OUTPATIENT
Start: 2024-08-23 | End: 2024-08-23

## 2024-08-23 RX ADMIN — SODIUM CHLORIDE 30 MILLILITER(S): 9 INJECTION, SOLUTION INTRAVENOUS at 05:29

## 2024-08-23 RX ADMIN — Medication 30 MILLILITER(S): at 06:16

## 2024-08-23 RX ADMIN — FUROSEMIDE 20 MILLIGRAM(S): 10 INJECTION INTRAMUSCULAR; INTRAVENOUS at 12:10

## 2024-08-23 RX ADMIN — Medication 40 MILLIGRAM(S): at 05:30

## 2024-08-23 RX ADMIN — Medication 100 MILLIGRAM(S): at 12:10

## 2024-08-23 RX ADMIN — Medication 50 MILLIGRAM(S): at 05:30

## 2024-08-23 RX ADMIN — Medication 30 MILLILITER(S): at 19:20

## 2024-08-23 RX ADMIN — FUROSEMIDE 20 MILLIGRAM(S): 10 INJECTION INTRAMUSCULAR; INTRAVENOUS at 05:29

## 2024-08-23 RX ADMIN — Medication 1 APPLICATION(S): at 11:29

## 2024-08-23 RX ADMIN — Medication 50 MILLIGRAM(S): at 12:10

## 2024-08-23 RX ADMIN — ENOXAPARIN SODIUM 40 MILLIGRAM(S): 100 INJECTION SUBCUTANEOUS at 11:32

## 2024-08-23 RX ADMIN — Medication 30 MILLILITER(S): at 07:18

## 2024-08-23 RX ADMIN — FOLIC ACID 1 MILLIGRAM(S): 1 TABLET ORAL at 11:31

## 2024-08-23 RX ADMIN — SODIUM CHLORIDE 30 MILLILITER(S): 9 INJECTION, SOLUTION INTRAVENOUS at 14:20

## 2024-08-24 ENCOUNTER — RESULT REVIEW (OUTPATIENT)
Age: 44
End: 2024-08-24

## 2024-08-24 ENCOUNTER — TRANSCRIPTION ENCOUNTER (OUTPATIENT)
Age: 44
End: 2024-08-24

## 2024-08-24 VITALS
TEMPERATURE: 98 F | OXYGEN SATURATION: 100 % | RESPIRATION RATE: 18 BRPM | HEART RATE: 86 BPM | SYSTOLIC BLOOD PRESSURE: 147 MMHG | DIASTOLIC BLOOD PRESSURE: 74 MMHG

## 2024-08-24 LAB
ALBUMIN SERPL ELPH-MCNC: 3.6 G/DL — SIGNIFICANT CHANGE UP (ref 3.3–5)
ALP SERPL-CCNC: 138 U/L — HIGH (ref 40–120)
ALT FLD-CCNC: 35 U/L — SIGNIFICANT CHANGE UP (ref 4–41)
ANION GAP SERPL CALC-SCNC: 12 MMOL/L — SIGNIFICANT CHANGE UP (ref 7–14)
AST SERPL-CCNC: 86 U/L — HIGH (ref 4–40)
BASOPHILS # BLD AUTO: 0.13 K/UL — SIGNIFICANT CHANGE UP (ref 0–0.2)
BASOPHILS NFR BLD AUTO: 1.3 % — SIGNIFICANT CHANGE UP (ref 0–2)
BILIRUB SERPL-MCNC: 4.9 MG/DL — HIGH (ref 0.2–1.2)
BLD GP AB SCN SERPL QL: NEGATIVE — SIGNIFICANT CHANGE UP
BUN SERPL-MCNC: 22 MG/DL — SIGNIFICANT CHANGE UP (ref 7–23)
CALCIUM SERPL-MCNC: 8.6 MG/DL — SIGNIFICANT CHANGE UP (ref 8.4–10.5)
CHLORIDE SERPL-SCNC: 101 MMOL/L — SIGNIFICANT CHANGE UP (ref 98–107)
CO2 SERPL-SCNC: 25 MMOL/L — SIGNIFICANT CHANGE UP (ref 22–31)
CREAT SERPL-MCNC: 0.88 MG/DL — SIGNIFICANT CHANGE UP (ref 0.5–1.3)
EGFR: 108 ML/MIN/1.73M2 — SIGNIFICANT CHANGE UP
EGFR: 108 ML/MIN/1.73M2 — SIGNIFICANT CHANGE UP
EOSINOPHIL # BLD AUTO: 0.94 K/UL — HIGH (ref 0–0.5)
EOSINOPHIL NFR BLD AUTO: 9.2 % — HIGH (ref 0–6)
GLUCOSE SERPL-MCNC: 104 MG/DL — HIGH (ref 70–99)
HCT VFR BLD CALC: 17.8 % — CRITICAL LOW (ref 39–50)
HGB BLD-MCNC: 6.1 G/DL — CRITICAL LOW (ref 13–17)
IANC: 3.98 K/UL — SIGNIFICANT CHANGE UP (ref 1.8–7.4)
IMM GRANULOCYTES NFR BLD AUTO: 0.4 % — SIGNIFICANT CHANGE UP (ref 0–0.9)
LYMPHOCYTES # BLD AUTO: 3.9 K/UL — HIGH (ref 1–3.3)
LYMPHOCYTES # BLD AUTO: 38.1 % — SIGNIFICANT CHANGE UP (ref 13–44)
MAGNESIUM SERPL-MCNC: 1.5 MG/DL — LOW (ref 1.6–2.6)
MCHC RBC-ENTMCNC: 27.7 PG — SIGNIFICANT CHANGE UP (ref 27–34)
MCHC RBC-ENTMCNC: 34.3 GM/DL — SIGNIFICANT CHANGE UP (ref 32–36)
MCV RBC AUTO: 80.9 FL — SIGNIFICANT CHANGE UP (ref 80–100)
MONOCYTES # BLD AUTO: 1.24 K/UL — HIGH (ref 0–0.9)
MONOCYTES NFR BLD AUTO: 12.1 % — SIGNIFICANT CHANGE UP (ref 2–14)
NEUTROPHILS # BLD AUTO: 3.98 K/UL — SIGNIFICANT CHANGE UP (ref 1.8–7.4)
NEUTROPHILS NFR BLD AUTO: 38.9 % — LOW (ref 43–77)
NRBC # BLD AUTO: 0.12 K/UL — HIGH (ref 0–0)
NRBC # BLD: 1 /100 WBCS — HIGH (ref 0–0)
NRBC # FLD: 0.12 K/UL — HIGH (ref 0–0)
NRBC BLD-RTO: 1 /100 WBCS — HIGH (ref 0–0)
PHOSPHATE SERPL-MCNC: 4.4 MG/DL — SIGNIFICANT CHANGE UP (ref 2.5–4.5)
PLATELET # BLD AUTO: 369 K/UL — SIGNIFICANT CHANGE UP (ref 150–400)
POTASSIUM SERPL-MCNC: 4.3 MMOL/L — SIGNIFICANT CHANGE UP (ref 3.5–5.3)
POTASSIUM SERPL-SCNC: 4.3 MMOL/L — SIGNIFICANT CHANGE UP (ref 3.5–5.3)
PROT SERPL-MCNC: 7.1 G/DL — SIGNIFICANT CHANGE UP (ref 6–8.3)
RBC # BLD: 2.2 M/UL — LOW (ref 4.2–5.8)
RBC # FLD: 20.6 % — HIGH (ref 10.3–14.5)
RH IG SCN BLD-IMP: POSITIVE — SIGNIFICANT CHANGE UP
SODIUM SERPL-SCNC: 138 MMOL/L — SIGNIFICANT CHANGE UP (ref 135–145)
URATE SERPL-MCNC: 9.6 MG/DL — HIGH (ref 3.4–8.8)
WBC # BLD: 10.23 K/UL — SIGNIFICANT CHANGE UP (ref 3.8–10.5)
WBC # FLD AUTO: 10.23 K/UL — SIGNIFICANT CHANGE UP (ref 3.8–10.5)

## 2024-08-24 PROCEDURE — 93970 EXTREMITY STUDY: CPT | Mod: 26

## 2024-08-24 PROCEDURE — 99239 HOSP IP/OBS DSCHRG MGMT >30: CPT

## 2024-08-24 RX ORDER — SPIRONOLACTONE 25 MG
4 TABLET ORAL
Qty: 120 | Refills: 0
Start: 2024-08-24 | End: 2024-09-22

## 2024-08-24 RX ORDER — FUROSEMIDE 10 MG/ML
1 INJECTION INTRAMUSCULAR; INTRAVENOUS
Qty: 30 | Refills: 0
Start: 2024-08-24 | End: 2024-09-22

## 2024-08-24 RX ORDER — POLYETHYLENE GLYCOL 3350 17 G/17G
17 POWDER, FOR SOLUTION ORAL
Qty: 0 | Refills: 0 | DISCHARGE
Start: 2024-08-24

## 2024-08-24 RX ADMIN — Medication 30 MILLILITER(S): at 08:01

## 2024-08-24 RX ADMIN — FUROSEMIDE 40 MILLIGRAM(S): 10 INJECTION INTRAMUSCULAR; INTRAVENOUS at 05:20

## 2024-08-24 RX ADMIN — Medication 1 APPLICATION(S): at 12:35

## 2024-08-24 RX ADMIN — Medication 100 MILLIGRAM(S): at 12:38

## 2024-08-24 RX ADMIN — FOLIC ACID 1 MILLIGRAM(S): 1 TABLET ORAL at 12:38

## 2024-08-24 RX ADMIN — Medication 100 MILLIGRAM(S): at 05:20

## 2024-08-24 RX ADMIN — Medication 40 MILLIGRAM(S): at 05:20

## 2024-08-25 LAB
PETH 16:0/18:1: NEGATIVE NG/ML — SIGNIFICANT CHANGE UP
PETH 16:0/18:2: NEGATIVE NG/ML — SIGNIFICANT CHANGE UP
PETH COMMENTS: SIGNIFICANT CHANGE UP

## 2024-09-23 NOTE — ED ADULT NURSE NOTE - NSFALLRISKFACTORS_ED_ALL_ED
No protocol for requested medication.    Medication: Flexeril  Last office visit date: 3/4/24  [facility] [place] [facility] [address]    Order pended, routed to clinician for review.     Medication: Albuterol, Advair, Amlodipine passed protocol.   Next appointment scheduled?: Yes   Number of refills given: 3     No indicators present

## 2024-09-30 NOTE — ED PROVIDER NOTE - COVID-19 RESULT DATE/TIME
1. Have you been to the ER, urgent care clinic since your last visit?  Hospitalized since your last visit?     no      2.  Where do you normally have your labs drawn?       3. Do you need any refills today?   no    4. Which local pharmacy do you use (enter pharmacy[facility]    5. Which mail order pharmacy do you use (enter pharmacy)?        6. Are you here for surgical clearance and if so who will be doing your     procedure/surgery (care team)?   no      
Ishan Camacho . is a 70 y.o. year old male.    Follow-up of CHF, A-fib, hypertension, hyperlipidemia, mitral regurgitation, pulmonary hypertension        7/6/2023  Patient seen for pre-op evaluation.  He is anticipating right cataract surgery on 7/11/2023.  He denies chest pain, shortness of breath or palpitations.  He has chronic dependent edema.   7/20/2023  Patient seen In f/u for Afib.  He denies chest pain, shortness of breath or palpitations.  He has chronic dependent edema, controlled with compression stockings and lasix.  1/2024  Patient complaining of increased bilateral lower extremity edema with weight gain and abdominal distention.  He complains of mild shortness of breath, denies chest pain or palpitations.  2/2024.  Patient seen in follow-up for acute diastolic CHF.  Lower extremity edema has improved with 25 pound weight loss.  He reports improvement in shortness of breath.  5/20/2024  Patient seen in follow-up for acute on chronic diastolic CHF.  Bilateral lower extremity edema remains.  He denies shortness of breath palpitations or chest pain  9/30/2024 continues with edema.  Has dyspnea on exertion walking but cannot define very well.  Denies any chest pain.  Is aware of irregular heartbeat.          Review of Systems   Constitutional: Negative.    HENT: Negative.     Eyes: Negative.    Respiratory:  Positive for shortness of breath.    Cardiovascular:  Positive for palpitations (perm AFib) and leg swelling.   Gastrointestinal: Negative.    Endocrine: Negative.    Genitourinary: Negative.    Musculoskeletal: Negative.    Neurological: Negative.    Psychiatric/Behavioral: Negative.     All other systems reviewed and are negative.        Physical Exam  Vitals and nursing note reviewed.   Constitutional:       Appearance: Normal appearance. He is obese.   HENT:      Head: Normocephalic and atraumatic.      Nose: Nose normal.   Eyes:      Conjunctiva/sclera: Conjunctivae normal.   Cardiovascular: 
31-May-2022 03:20

## 2024-10-24 ENCOUNTER — INPATIENT (INPATIENT)
Facility: HOSPITAL | Age: 44
LOS: 20 days | Discharge: HOME | End: 2024-11-14
Attending: INTERNAL MEDICINE | Admitting: INTERNAL MEDICINE
Payer: MEDICAID

## 2024-10-24 VITALS
HEART RATE: 125 BPM | TEMPERATURE: 98 F | SYSTOLIC BLOOD PRESSURE: 160 MMHG | WEIGHT: 190.04 LBS | RESPIRATION RATE: 20 BRPM | DIASTOLIC BLOOD PRESSURE: 84 MMHG | OXYGEN SATURATION: 95 %

## 2024-10-24 DIAGNOSIS — Z90.49 ACQUIRED ABSENCE OF OTHER SPECIFIED PARTS OF DIGESTIVE TRACT: Chronic | ICD-10-CM

## 2024-10-24 LAB — BLOOD GAS VENOUS COMPREHENSIVE RESULT: SIGNIFICANT CHANGE UP

## 2024-10-24 PROCEDURE — 99285 EMERGENCY DEPT VISIT HI MDM: CPT

## 2024-10-24 RX ORDER — HYDROMORPHONE HCL/0.9% NACL/PF 6 MG/30 ML
1 PATIENT CONTROLLED ANALGESIA SYRINGE INTRAVENOUS ONCE
Refills: 0 | Status: DISCONTINUED | OUTPATIENT
Start: 2024-10-24 | End: 2024-10-24

## 2024-10-24 RX ORDER — SODIUM CHLORIDE 9 MG/ML
1000 INJECTION, SOLUTION INTRAMUSCULAR; INTRAVENOUS; SUBCUTANEOUS ONCE
Refills: 0 | Status: COMPLETED | OUTPATIENT
Start: 2024-10-24 | End: 2024-10-24

## 2024-10-24 RX ADMIN — Medication 1 MILLIGRAM(S): at 23:53

## 2024-10-24 RX ADMIN — SODIUM CHLORIDE 1000 MILLILITER(S): 9 INJECTION, SOLUTION INTRAMUSCULAR; INTRAVENOUS; SUBCUTANEOUS at 23:54

## 2024-10-24 NOTE — ED ADULT NURSE NOTE - NSFALLUNIVINTERV_ED_ALL_ED
Bed/Stretcher in lowest position, wheels locked, appropriate side rails in place/Call bell, personal items and telephone in reach/Instruct patient to call for assistance before getting out of bed/chair/stretcher/Non-slip footwear applied when patient is off stretcher/[place] to call system/Physically safe environment - no spills, clutter or unnecessary equipment/Purposeful proactive rounding/Room/bathroom lighting operational, light cord in reach

## 2024-10-24 NOTE — ED PROVIDER NOTE - OBJECTIVE STATEMENT
44 year-old male patient past medical history sickle cell constipation presents to ED for low hemoglobin 5.6 and was sent here for transfusion.  Patient also states he is having his sickle cell pain specifically back pain, and lower extremity pain.  Patient states he is also short of breath.  Denies fevers, no cough, no chest pain. Pt states he normally has to get transfused every few  months and gets admitted.

## 2024-10-24 NOTE — ED PROVIDER NOTE - PHYSICAL EXAMINATION
GENERAL: NAD  HEENT:  Atraumatic  CHEST/LUNG: Chest rise equal bilaterally, clear breath sounds b/l  HEART: Regular rate and rhythm  ABDOMEN: Soft, Nontender, Nondistended  EXTREMITIES:  Extremities warm  PSYCH: A&Ox3  SKIN: No obvious rashes or lesions.  MSK: No midline bony tenderness

## 2024-10-24 NOTE — ED ADULT NURSE NOTE - OBJECTIVE STATEMENT
Chief Complaint:  · Chief Complaint Quote	C/o abnormal lab result and sickle cell crisis x 1 day. pt advised to come to ED for Hg of 5.3. endorsing SOB, weakness, pain to lower back, upper and lower extremities. pt noted with O2 saturation 89% on RA, placed on 2 L NC. Hx sickle cell anemia      Received pt in stable condition. Pt is alert and oriented x3.  Pt present to the ED with c/o abnormal hemoglobin level 5.3.   Pt denies difficulty breathing at this time.  No acute respiratory distress noted. Respiration even and non-labored.  O2 via n/c on going at 2-3 L/min.  Pt also c/o of back pain related to sickle cell pain.  Continuous Cardiac monitor in place with HR of   78  BPM  NSR and O2 sat of  92-93 % on 2-3 L/min.

## 2024-10-24 NOTE — ED ADULT TRIAGE NOTE - CHIEF COMPLAINT QUOTE
C/o abnormal lab result and sickle cell crisis x 1 day. pt advised to come to ED for Hg of 5.3. endorsing SOB, weakness, pain to lower back, upper and lower extremities. pt noted with O2 saturation 89% on RA, placed on 2 L NC. Hx sickle cell anemia

## 2024-10-24 NOTE — ED PROVIDER NOTE - CLINICAL SUMMARY MEDICAL DECISION MAKING FREE TEXT BOX
Dorita Martinez MD attending physician.  44-year-old gentleman with a history of sickle cell comes in with both sickle cell pain as well as a hemoglobin below 6 that needs transfusion.  On exam patient looks very icteric..  Patient states he has his usual sickle cell pain.  Vital signs 160/84 respiratory rate 20 heart rate 125 afebrile O2 sat 95    Labs being drawn to evaluate patient.  Signing out to Dr. Victoria already awaiting results and disposition

## 2024-10-24 NOTE — ED ADULT NURSE REASSESSMENT NOTE - NS ED NURSE REASSESS COMMENT FT1
ROBERT RN: Patient port accessed at this time, dressing dry, clean, and intact, no complaints noted at this time. Breathing even and unlabored, pallor/diaphoresis not noted.

## 2024-10-24 NOTE — ED PROVIDER NOTE - ATTENDING CONTRIBUTION TO CARE
Dorita Martinez MD attending physician.  44-year-old gentleman with a history of sickle cell comes in with both sickle cell pain as well as a hemoglobin below 6 that needs transfusion.  On exam patient looks very icteric..  Patient states he has his usual sickle cell pain.  Vital signs 160/84 respiratory rate 20 heart rate 125 afebrile O2 sat 95    Labs being drawn to evaluate patient.  Signing out to Dr. Victoria already awaiting results and disposition    I performed a history and physical exam of the patient and discussed their management with the resident and /or advanced care provider. I personally made/approved the management plan and take responsibility for the patient management. I reviewed the resident and /or ACP's note and agree with the documented findings and plan of care. My medical decison making and observations are found above.

## 2024-10-25 DIAGNOSIS — R74.01 ELEVATION OF LEVELS OF LIVER TRANSAMINASE LEVELS: ICD-10-CM

## 2024-10-25 DIAGNOSIS — R06.02 SHORTNESS OF BREATH: ICD-10-CM

## 2024-10-25 DIAGNOSIS — N17.9 ACUTE KIDNEY FAILURE, UNSPECIFIED: ICD-10-CM

## 2024-10-25 DIAGNOSIS — Z29.9 ENCOUNTER FOR PROPHYLACTIC MEASURES, UNSPECIFIED: ICD-10-CM

## 2024-10-25 DIAGNOSIS — M10.9 GOUT, UNSPECIFIED: ICD-10-CM

## 2024-10-25 DIAGNOSIS — D57.00 HB-SS DISEASE WITH CRISIS, UNSPECIFIED: ICD-10-CM

## 2024-10-25 DIAGNOSIS — J96.01 ACUTE RESPIRATORY FAILURE WITH HYPOXIA: ICD-10-CM

## 2024-10-25 LAB
ADD ON TEST-SPECIMEN IN LAB: SIGNIFICANT CHANGE UP
ADD ON TEST-SPECIMEN IN LAB: SIGNIFICANT CHANGE UP
ALBUMIN SERPL ELPH-MCNC: 3.7 G/DL — SIGNIFICANT CHANGE UP (ref 3.3–5)
ALP SERPL-CCNC: 134 U/L — HIGH (ref 40–120)
ALT FLD-CCNC: 46 U/L — HIGH (ref 4–41)
ANION GAP SERPL CALC-SCNC: 13 MMOL/L — SIGNIFICANT CHANGE UP (ref 7–14)
ANISOCYTOSIS BLD QL: SIGNIFICANT CHANGE UP
APTT BLD: 28.5 SEC — SIGNIFICANT CHANGE UP (ref 24.5–35.6)
AST SERPL-CCNC: 123 U/L — HIGH (ref 4–40)
BASOPHILS # BLD AUTO: 0.11 K/UL — SIGNIFICANT CHANGE UP (ref 0–0.2)
BASOPHILS NFR BLD AUTO: 0.9 % — SIGNIFICANT CHANGE UP (ref 0–2)
BILIRUB DIRECT SERPL-MCNC: 3.1 MG/DL — HIGH (ref 0–0.3)
BILIRUB INDIRECT FLD-MCNC: 4.8 MG/DL — HIGH (ref 0–1)
BILIRUB SERPL-MCNC: 7.6 MG/DL — HIGH (ref 0.2–1.2)
BILIRUB SERPL-MCNC: 7.9 MG/DL — HIGH (ref 0.2–1.2)
BLD GP AB SCN SERPL QL: NEGATIVE — SIGNIFICANT CHANGE UP
BUN SERPL-MCNC: 28 MG/DL — HIGH (ref 7–23)
CALCIUM SERPL-MCNC: 8.6 MG/DL — SIGNIFICANT CHANGE UP (ref 8.4–10.5)
CHLORIDE SERPL-SCNC: 106 MMOL/L — SIGNIFICANT CHANGE UP (ref 98–107)
CO2 SERPL-SCNC: 17 MMOL/L — LOW (ref 22–31)
CREAT ?TM UR-MCNC: 36 MG/DL — SIGNIFICANT CHANGE UP
CREAT SERPL-MCNC: 1.24 MG/DL — SIGNIFICANT CHANGE UP (ref 0.5–1.3)
DACRYOCYTES BLD QL SMEAR: SLIGHT — SIGNIFICANT CHANGE UP
EGFR: 73 ML/MIN/1.73M2 — SIGNIFICANT CHANGE UP
EOSINOPHIL # BLD AUTO: 0.11 K/UL — SIGNIFICANT CHANGE UP (ref 0–0.5)
EOSINOPHIL NFR BLD AUTO: 0.9 % — SIGNIFICANT CHANGE UP (ref 0–6)
FERRITIN SERPL-MCNC: 3957 NG/ML — HIGH (ref 30–400)
GIANT PLATELETS BLD QL SMEAR: PRESENT — SIGNIFICANT CHANGE UP
GLUCOSE SERPL-MCNC: 89 MG/DL — SIGNIFICANT CHANGE UP (ref 70–99)
HCT VFR BLD CALC: 14.2 % — CRITICAL LOW (ref 39–50)
HCT VFR BLD CALC: 17.4 % — CRITICAL LOW (ref 39–50)
HGB BLD-MCNC: 5.2 G/DL — CRITICAL LOW (ref 13–17)
HGB BLD-MCNC: 6.3 G/DL — CRITICAL LOW (ref 13–17)
HYPOCHROMIA BLD QL: SIGNIFICANT CHANGE UP
IANC: 6.22 K/UL — SIGNIFICANT CHANGE UP (ref 1.8–7.4)
INR BLD: 1.15 RATIO — SIGNIFICANT CHANGE UP (ref 0.85–1.16)
IRON SATN MFR SERPL: 428 UG/DL — HIGH (ref 45–165)
IRON SATN MFR SERPL: 79 % — HIGH (ref 14–50)
LDH SERPL L TO P-CCNC: 1492 U/L — HIGH (ref 135–225)
LYMPHOCYTES # BLD AUTO: 3.68 K/UL — HIGH (ref 1–3.3)
LYMPHOCYTES # BLD AUTO: 30.4 % — SIGNIFICANT CHANGE UP (ref 13–44)
MACROCYTES BLD QL: SIGNIFICANT CHANGE UP
MANUAL SMEAR VERIFICATION: SIGNIFICANT CHANGE UP
MCHC RBC-ENTMCNC: 31 PG — SIGNIFICANT CHANGE UP (ref 27–34)
MCHC RBC-ENTMCNC: 31.1 PG — SIGNIFICANT CHANGE UP (ref 27–34)
MCHC RBC-ENTMCNC: 36.2 GM/DL — HIGH (ref 32–36)
MCHC RBC-ENTMCNC: 36.6 GM/DL — HIGH (ref 32–36)
MCV RBC AUTO: 85 FL — SIGNIFICANT CHANGE UP (ref 80–100)
MCV RBC AUTO: 85.7 FL — SIGNIFICANT CHANGE UP (ref 80–100)
MICROCYTES BLD QL: SLIGHT — SIGNIFICANT CHANGE UP
MONOCYTES # BLD AUTO: 0.97 K/UL — HIGH (ref 0–0.9)
MONOCYTES NFR BLD AUTO: 8 % — SIGNIFICANT CHANGE UP (ref 2–14)
NEUTROPHILS # BLD AUTO: 6.91 K/UL — SIGNIFICANT CHANGE UP (ref 1.8–7.4)
NEUTROPHILS NFR BLD AUTO: 57.1 % — SIGNIFICANT CHANGE UP (ref 43–77)
NRBC # BLD: 2 /100 WBCS — HIGH (ref 0–0)
NRBC # BLD: 4 /100 WBCS — HIGH (ref 0–0)
NRBC # FLD: 0.39 K/UL — HIGH (ref 0–0)
OSMOLALITY UR: 363 MOSM/KG — SIGNIFICANT CHANGE UP (ref 50–1200)
OVALOCYTES BLD QL SMEAR: SLIGHT — SIGNIFICANT CHANGE UP
PLAT MORPH BLD: NORMAL — SIGNIFICANT CHANGE UP
PLATELET # BLD AUTO: 249 K/UL — SIGNIFICANT CHANGE UP (ref 150–400)
PLATELET # BLD AUTO: 271 K/UL — SIGNIFICANT CHANGE UP (ref 150–400)
PLATELET COUNT - ESTIMATE: NORMAL — SIGNIFICANT CHANGE UP
POIKILOCYTOSIS BLD QL AUTO: SIGNIFICANT CHANGE UP
POLYCHROMASIA BLD QL SMEAR: SIGNIFICANT CHANGE UP
POTASSIUM SERPL-MCNC: 4.4 MMOL/L — SIGNIFICANT CHANGE UP (ref 3.5–5.3)
POTASSIUM SERPL-SCNC: 4.4 MMOL/L — SIGNIFICANT CHANGE UP (ref 3.5–5.3)
POTASSIUM UR-SCNC: 16.1 MMOL/L — SIGNIFICANT CHANGE UP
PROT ?TM UR-MCNC: 113 MG/DL — SIGNIFICANT CHANGE UP
PROT SERPL-MCNC: 6.8 G/DL — SIGNIFICANT CHANGE UP (ref 6–8.3)
PROT/CREAT UR-RTO: 3.1 RATIO — HIGH (ref 0–0.2)
PROTHROM AB SERPL-ACNC: 13.3 SEC — SIGNIFICANT CHANGE UP (ref 9.9–13.4)
RBC # BLD: 1.67 M/UL — LOW (ref 4.2–5.8)
RBC # BLD: 1.67 M/UL — LOW (ref 4.2–5.8)
RBC # BLD: 2.03 M/UL — LOW (ref 4.2–5.8)
RBC # FLD: 26.5 % — HIGH (ref 10.3–14.5)
RBC # FLD: 28.5 % — HIGH (ref 10.3–14.5)
RBC BLD AUTO: ABNORMAL
RETICS #: 311.5 K/UL — HIGH (ref 25–125)
RETICS/RBC NFR: 18.7 % — HIGH (ref 0.5–2.5)
RH IG SCN BLD-IMP: POSITIVE — SIGNIFICANT CHANGE UP
SICKLE CELLS BLD QL SMEAR: SIGNIFICANT CHANGE UP
SODIUM SERPL-SCNC: 136 MMOL/L — SIGNIFICANT CHANGE UP (ref 135–145)
SODIUM UR-SCNC: 95 MMOL/L — SIGNIFICANT CHANGE UP
TARGETS BLD QL SMEAR: SLIGHT — SIGNIFICANT CHANGE UP
TIBC SERPL-MCNC: 541 UG/DL — HIGH (ref 220–430)
TROPONIN T, HIGH SENSITIVITY RESULT: 29 NG/L — SIGNIFICANT CHANGE UP
UIBC SERPL-MCNC: 113 UG/DL — SIGNIFICANT CHANGE UP (ref 110–370)
UUN UR-MCNC: 296 MG/DL — SIGNIFICANT CHANGE UP
VARIANT LYMPHS # BLD: 2.7 % — SIGNIFICANT CHANGE UP (ref 0–6)
WBC # BLD: 10.69 K/UL — HIGH (ref 3.8–10.5)
WBC # BLD: 12.1 K/UL — HIGH (ref 3.8–10.5)
WBC # FLD AUTO: 10.69 K/UL — HIGH (ref 3.8–10.5)
WBC # FLD AUTO: 12.1 K/UL — HIGH (ref 3.8–10.5)

## 2024-10-25 PROCEDURE — 71046 X-RAY EXAM CHEST 2 VIEWS: CPT | Mod: 26

## 2024-10-25 PROCEDURE — 93975 VASCULAR STUDY: CPT | Mod: 26

## 2024-10-25 PROCEDURE — 74177 CT ABD & PELVIS W/CONTRAST: CPT | Mod: 26,MC

## 2024-10-25 PROCEDURE — 71275 CT ANGIOGRAPHY CHEST: CPT | Mod: 26,MC

## 2024-10-25 PROCEDURE — 99223 1ST HOSP IP/OBS HIGH 75: CPT

## 2024-10-25 RX ORDER — HYDROMORPHONE HCL/0.9% NACL/PF 6 MG/30 ML
1 PATIENT CONTROLLED ANALGESIA SYRINGE INTRAVENOUS ONCE
Refills: 0 | Status: DISCONTINUED | OUTPATIENT
Start: 2024-10-25 | End: 2024-10-25

## 2024-10-25 RX ORDER — DIPHENHYDRAMINE HCL 12.5MG/5ML
25 ELIXIR ORAL EVERY 6 HOURS
Refills: 0 | Status: DISCONTINUED | OUTPATIENT
Start: 2024-10-25 | End: 2024-11-14

## 2024-10-25 RX ORDER — ALLOPURINOL 100 MG
100 TABLET ORAL DAILY
Refills: 0 | Status: DISCONTINUED | OUTPATIENT
Start: 2024-10-25 | End: 2024-10-25

## 2024-10-25 RX ORDER — MELATONIN 5 MG
3 TABLET ORAL AT BEDTIME
Refills: 0 | Status: DISCONTINUED | OUTPATIENT
Start: 2024-10-25 | End: 2024-11-14

## 2024-10-25 RX ORDER — SENNA 187 MG
2 TABLET ORAL AT BEDTIME
Refills: 0 | Status: DISCONTINUED | OUTPATIENT
Start: 2024-10-25 | End: 2024-11-14

## 2024-10-25 RX ORDER — NALOXONE HYDROCHLORIDE 1 MG/ML
0.1 INJECTION, SOLUTION INTRAMUSCULAR; INTRAVENOUS; SUBCUTANEOUS
Refills: 0 | Status: DISCONTINUED | OUTPATIENT
Start: 2024-10-25 | End: 2024-11-14

## 2024-10-25 RX ORDER — PANTOPRAZOLE SODIUM 40 MG/1
40 TABLET, DELAYED RELEASE ORAL
Refills: 0 | Status: DISCONTINUED | OUTPATIENT
Start: 2024-10-25 | End: 2024-11-14

## 2024-10-25 RX ORDER — POLYETHYLENE GLYCOL 3350 17 G/17G
17 POWDER, FOR SOLUTION ORAL DAILY
Refills: 0 | Status: DISCONTINUED | OUTPATIENT
Start: 2024-10-25 | End: 2024-11-14

## 2024-10-25 RX ORDER — ACETAMINOPHEN 500 MG
650 TABLET ORAL EVERY 6 HOURS
Refills: 0 | Status: DISCONTINUED | OUTPATIENT
Start: 2024-10-25 | End: 2024-11-14

## 2024-10-25 RX ORDER — ONDANSETRON HYDROCHLORIDE 2 MG/ML
4 INJECTION, SOLUTION INTRAMUSCULAR; INTRAVENOUS EVERY 6 HOURS
Refills: 0 | Status: DISCONTINUED | OUTPATIENT
Start: 2024-10-25 | End: 2024-11-14

## 2024-10-25 RX ORDER — ACETAMINOPHEN 500 MG
1000 TABLET ORAL ONCE
Refills: 0 | Status: COMPLETED | OUTPATIENT
Start: 2024-10-25 | End: 2024-10-25

## 2024-10-25 RX ORDER — ALLOPURINOL 100 MG
50 TABLET ORAL DAILY
Refills: 0 | Status: DISCONTINUED | OUTPATIENT
Start: 2024-10-25 | End: 2024-11-14

## 2024-10-25 RX ORDER — FOLIC ACID 1 MG/1
1 TABLET ORAL EVERY 24 HOURS
Refills: 0 | Status: DISCONTINUED | OUTPATIENT
Start: 2024-10-25 | End: 2024-11-14

## 2024-10-25 RX ORDER — INFLUENZ VIR VAC TV P-SURF2003 15MCG/.5ML
0.5 SYRINGE (ML) INTRAMUSCULAR ONCE
Refills: 0 | Status: COMPLETED | OUTPATIENT
Start: 2024-10-25 | End: 2024-10-25

## 2024-10-25 RX ORDER — HYDROMORPHONE HCL/0.9% NACL/PF 6 MG/30 ML
30 PATIENT CONTROLLED ANALGESIA SYRINGE INTRAVENOUS
Refills: 0 | Status: DISCONTINUED | OUTPATIENT
Start: 2024-10-25 | End: 2024-11-01

## 2024-10-25 RX ORDER — DIPHENHYDRAMINE HCL 12.5MG/5ML
25 ELIXIR ORAL ONCE
Refills: 0 | Status: COMPLETED | OUTPATIENT
Start: 2024-10-25 | End: 2024-10-25

## 2024-10-25 RX ADMIN — Medication 1 MILLIGRAM(S): at 01:17

## 2024-10-25 RX ADMIN — Medication 50 MILLIGRAM(S): at 12:28

## 2024-10-25 RX ADMIN — Medication 25 MILLIGRAM(S): at 07:09

## 2024-10-25 RX ADMIN — Medication 25 MILLIGRAM(S): at 04:06

## 2024-10-25 RX ADMIN — Medication 30 MILLILITER(S): at 11:08

## 2024-10-25 RX ADMIN — Medication 100 MILLILITER(S): at 09:45

## 2024-10-25 RX ADMIN — Medication 400 MILLIGRAM(S): at 00:53

## 2024-10-25 RX ADMIN — Medication 1 MILLIGRAM(S): at 07:09

## 2024-10-25 RX ADMIN — Medication 30 MILLILITER(S): at 19:14

## 2024-10-25 RX ADMIN — Medication 2 TABLET(S): at 22:20

## 2024-10-25 RX ADMIN — FOLIC ACID 1 MILLIGRAM(S): 1 TABLET ORAL at 10:41

## 2024-10-25 RX ADMIN — Medication 100 MILLILITER(S): at 18:30

## 2024-10-25 NOTE — H&P ADULT - HISTORY OF PRESENT ILLNESS
Patient is a 43yo M with PMH significant for sickle cell anemia, cirrhosis, hemochromatosis, and gout who presented with a low hemoglobin count from outpatient, also reporting worsening for lower back and lower extremity pain similar to prior sickle cell crises. He reports ongoing pain. Denies chest pain, but admits to some exertional dyspnea which he attributes to his acute anemia.   He was seen by his outpatient physician and found to be anemic, recommended for 2U PRBC transfusion.    Vital signs significant for: afebrile, initially tachycardic to 125bpm down to 70-80s, hypertensive up to 160/84 now improved to SBP 140s, RR 18-20, SpO2 96% on 2L NC  Labs significant for: Hb 5.2 (baseline ~6-6.5), WBC 12.10, plt 249, retic percent 18.7% (prior 8.7%), abs retic 311.5, Tbili 7.9, Dbili 3.1, LDH 1492, total iron 428, iron sat 79%, ferritin 3957, Cr 1.55, AG 16, CO2 16, BUN 29,  (prior 138),  (prior 86), ALT 49 (prior 49), proBNP 1830, VBG pH 7.31, lactate 0.7.   Imaging significant for:   - CXR: no focal consolidations  - CTA chest/abd/pelvis: no pulmonary embolism. Scattered bilateral subsegmental opacities may represent atelectasis. Stable upper abdominal lymphadenopathy. Calcified autoinfarcted spleen. Hypertrophied lateral segment of the left hepatic lobe along with hepatorenal notching compatible with cirrhotic morphology.

## 2024-10-25 NOTE — PATIENT PROFILE ADULT - FALL HARM RISK - HARM RISK INTERVENTIONS

## 2024-10-25 NOTE — H&P ADULT - TIME BILLING
Time-based billing (NON-critical care).     81 minutes spent on total encounter; more than 50% of the visit was spent counseling and / or coordinating care by the attending physician.  The necessity of the time spent during the encounter on this date of service was due to:     review of laboratory data, radiology results, consultants' recommendations, documentation in [place], discussion with patient

## 2024-10-25 NOTE — H&P ADULT - PROBLEM SELECTOR PLAN 5
-  in setting of cirrhosis and hemochromatosis, as previously known and visualized on CTAP  - will consult hepatology  - trend for now  - will defer diuretics for now given MATA -  in setting of cirrhosis and hemochromatosis, as previously known and visualized on CTAP  - will consult hepatology  - trend for now  - will defer diuretics for now given MATA    Some edema noted on b/l LE, will still offer hypotonic IVF but will need to consider hepatorenal etiology.

## 2024-10-25 NOTE — H&P ADULT - PROBLEM SELECTOR PLAN 2
- in setting of atelectasis as seen on CTA chest  - no evidence of acute chest syndrome  - incentive spirometry  - wean O2 as tolerated - in setting of atelectasis as seen on CTA chest  - no evidence of acute chest syndrome  - incentive spirometry  - wean O2 as tolerated  - leukocytosis noted, unlikely active infection given chest imaging and absence of other symptoms - likely reactive, defer abx

## 2024-10-25 NOTE — ED ADULT NURSE REASSESSMENT NOTE - NS ED NURSE REASSESS COMMENT FT1
Break covering RN: patient received, appears to be resting comfortably in bed, no complaints noted at this time. Breathing even and unlabored, pallor/diaphoresis not noted. xray chest, will continue to monitor.

## 2024-10-25 NOTE — ED ADULT NURSE REASSESSMENT NOTE - NS ED NURSE REASSESS COMMENT FT1
Pt left the ED accompanied Nurse and transporter.  Blood transfusion on going.  Pt received pain before leaving to further complaints made.

## 2024-10-25 NOTE — ED ADULT NURSE REASSESSMENT NOTE - NS ED NURSE REASSESS COMMENT FT1
Pt started on 1 unit PRBC @4:04am.  No ADR noted.  Pt denies chest pain, cough, chills, abdominal pain, N/V/D, h/a, dizziness, numbness/tingling  at this time. General condition is stable. Pt made comfortable. All safety precautions maintained.

## 2024-10-25 NOTE — H&P ADULT - NSHPREVIEWOFSYSTEMS_GEN_ALL_CORE
CONSTITUTIONAL: No fever, weight loss, or fatigue  EYES: No eye pain, visual disturbances, or discharge  ENMT:  No difficulty hearing, tinnitus, vertigo; No sinus or throat pain  NECK: No pain or stiffness  BREASTS: No pain, masses, or nipple discharge  RESPIRATORY: No cough, wheezing, chills or hemoptysis; +shortness of breath on exertion  CARDIOVASCULAR: No chest pain, palpitations, dizziness, or leg swelling  GASTROINTESTINAL: No abdominal or epigastric pain. No nausea, vomiting, or hematemesis; No diarrhea or constipation. No melena or hematochezia.  GENITOURINARY: No dysuria, frequency, hematuria, or incontinence  NEUROLOGICAL: No headaches, memory loss, loss of strength, numbness, or tremors  SKIN: No itching, burning, rashes, or lesions   LYMPH NODES: No enlarged glands  ENDOCRINE: No heat or cold intolerance; No hair loss  MUSCULOSKELETAL: No joint pain or swelling; +muscle, back, or extremity pain  PSYCHIATRIC: No depression, anxiety, mood swings, or difficulty sleeping  HEME/LYMPH: No easy bruising, or bleeding gums  ALLERGY AND IMMUNOLOGIC: No hives or eczema

## 2024-10-25 NOTE — H&P ADULT - ASSESSMENT
Patient is a 43yo M with PMH significant for sickle cell anemia, cirrhosis, hemochromatosis, and gout who presented with a low hemoglobin count, admitted with sickle cell crisis.

## 2024-10-25 NOTE — H&P ADULT - NSHPLABSRESULTS_GEN_ALL_CORE
[facility]  Juan Carlos BeckfordDO  Available via MS Teams  In house pager [phone]    SUBJECTIVE / OVERNIGHT EVENTS:    LABS:                        5.2    12.10 )-----------( 249      ( 24 Oct 2024 23:48 )             14.2     10-24    135  |  103  |  29[H]  ----------------------------<  102[H]  4.6   |  16[L]  |  1.55[H]    Ca    8.7      24 Oct 2024 23:48    TPro  7.1  /  Alb  3.8  /  TBili  7.9[H]  /  DBili  3.1[H]  /  AST  138[H]  /  ALT  49[H]  /  AlkPhos  143[H]  10-24          Urinalysis Basic - ( 24 Oct 2024 23:48 )    Color: x / Appearance: x / SG: x / pH: x  Gluc: 102 mg/dL / Ketone: x  / Bili: x / Urobili: x   Blood: x / Protein: x / Nitrite: x   Leuk Esterase: x / RBC: x / WBC x   Sq Epi: x / Non Sq Epi: x / Bacteria: x

## 2024-10-25 NOTE — H&P ADULT - PROBLEM SELECTOR PLAN 4
No protocol for requested medication     Medication: Wegovy  Last office visit date: 9/11/23  Pharmacy: [id number] - [place] - [id number]    Order pended, routed to clinician for review.    - in setting of acute sickle cell crisis  - c/w hypotonic IVF  - suspect pre-renal MATA – check urine lytes to confirm  - trend BMP  - dose meds renally  - avoid NSAIDs, other nephrotoxic agents - in setting of acute sickle cell crisis  - c/w hypotonic IVF  - suspect pre-renal MATA – check urine lytes to confirm  - trend BMP  - dose meds renally  - avoid NSAIDs, other nephrotoxic agents    Some edema noted on b/l LE and elevated proBNP, will still offer hypotonic IVF but will need to consider hepatorenal etiology.

## 2024-10-25 NOTE — CONSULT NOTE ADULT - SUBJECTIVE AND OBJECTIVE BOX
Chief Complaint:      HPI:        Allergies:  ceftriaxone (Anaphylaxis)  piperacillin-tazobactam (Urticaria)  penicillin (Pruritus)  hydroxyurea (Other)      Hospital Medications:  acetaminophen     Tablet .. 650 milliGRAM(s) Oral every 6 hours PRN  allopurinol 50 milliGRAM(s) Oral daily  diphenhydrAMINE 25 milliGRAM(s) Oral every 6 hours PRN  folic acid 1 milliGRAM(s) Oral every 24 hours  HYDROmorphone PCA (1 mG/mL) 30 milliLiter(s) PCA Continuous PCA Continuous  melatonin 3 milliGRAM(s) Oral at bedtime PRN  naloxone Injectable 0.1 milliGRAM(s) IV Push every 3 minutes PRN  ondansetron Injectable 4 milliGRAM(s) IV Push every 6 hours PRN  pantoprazole    Tablet 40 milliGRAM(s) Oral before breakfast  polyethylene glycol 3350 17 Gram(s) Oral daily  senna 2 Tablet(s) Oral at bedtime  sodium chloride 0.45%. 1000 milliLiter(s) IV Continuous <Continuous>      PMHX/PSHX:  Sickle cell anemia    Gout    Anemia requiring transfusions    Marijuana abuse    Pneumonia    History of cholecystectomy        Family history:  Family history of sickle cell trait (Father, Mother)    Patient's father is  (Father)    Patient's mother is  (Mother)        Social History:   No alcohol or smoking    ROS:   See HPI    PHYSICAL EXAM:   GENERAL:  NAD, resting comfortably in bed  HEENT:  Sclera anicteric  RESPIRATORY:  Normal effort  CARDIAC:  HDS  ABDOMEN:  Soft, non-tender, non-distended  EXTREMITIES:  No edema  SKIN:  Warm & Dry. No jaundice.   NEURO:  Alert, conversant, no focal deficit    Vital Signs:  Vital Signs Last 24 Hrs  T(C): 36.8 (25 Oct 2024 07:11), Max: 37 (25 Oct 2024 06:19)  T(F): 98.3 (25 Oct 2024 07:11), Max: 98.6 (25 Oct 2024 06:19)  HR: 77 (25 Oct 2024 07:11) (77 - 125)  BP: 140/87 (25 Oct 2024 07:11) (130/80 - 160/84)  BP(mean): --  RR: 18 (25 Oct 2024 05:15) (18 - 20)  SpO2: 96% (25 Oct 2024 04:19) (95% - 98%)    Parameters below as of 25 Oct 2024 05:15  Patient On (Oxygen Delivery Method): room air      Daily     Daily     LABS:                        5.2    12.10 )-----------( 249      ( 24 Oct 2024 23:48 )             14.2     Mean Cell Volume: 85.0 fL (10-24- @ 23:48)    10-24    135  |  103  |  29[H]  ----------------------------<  102[H]  4.6   |  16[L]  |  1.55[H]    Ca    8.7      24 Oct 2024 23:48    TPro  7.1  /  Alb  3.8  /  TBili  7.9[H]  /  DBili  3.1[H]  /  AST  138[H]  /  ALT  49[H]  /  AlkPhos  143[H]  10-24    LIVER FUNCTIONS - ( 24 Oct 2024 23:48 )  Alb: 3.8 g/dL / Pro: 7.1 g/dL / ALK PHOS: 143 U/L / ALT: 49 U/L / AST: 138 U/L / GGT: x             Urinalysis Basic - ( 24 Oct 2024 23:48 )    Color: x / Appearance: x / SG: x / pH: x  Gluc: 102 mg/dL / Ketone: x  / Bili: x / Urobili: x   Blood: x / Protein: x / Nitrite: x   Leuk Esterase: x / RBC: x / WBC x   Sq Epi: x / Non Sq Epi: x / Bacteria: x                              5.2    12.10 )-----------( 249      ( 24 Oct 2024 23:48 )             14.2     Imaging:   Chief Complaint:  pain crisis    HPI:    Mr. 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 He reports ongoing pain. Denies chest pain, but admits to some exertional dyspnea which he attributes to his acute anemia.   He was seen by his outpatient physician and found to be anemic, recommended for 2U PRBC transfusion.     Upon arrival, patient is afebrile, initially tachycardic to 125bpm down to 70-80s, hypertensive up to 160/84 now improved to SBP 140s, SpO2 96% on 2L NC. Labs are significant for Hb 5.2 (baseline ~6-6.5), Tbili 7.9, Dbili 3.1, LDH 1492, ferritin 3957, Cr 1.55, (prior 138),  (prior 86), ALT 49 (prior 49), lactate 0.7.  Imaging is significant for CXR with no focal consolidations and CTA chest/abd/pelvis with no pulmonary embolism, scattered bilateral subsegmental opacities, stable upper abdominal lymphadenopathy, calcified autoinfarcted spleen and hypertrophied lateral segment of the left hepatic lobe along with hepatorenal notching compatible with cirrhotic morphology.    Patient reports diagnosis of cirrhosis during his previous hospitalization. He states that he did not follow up with a liver doctor as he was told he didn't need to. He denies any history of abdominal distension, confusion or GI bleeding.     Allergies:  ceftriaxone (Anaphylaxis)  piperacillin-tazobactam (Urticaria)  penicillin (Pruritus)  hydroxyurea (Other)    Hospital Medications:  acetaminophen     Tablet .. 650 milliGRAM(s) Oral every 6 hours PRN  allopurinol 50 milliGRAM(s) Oral daily  diphenhydrAMINE 25 milliGRAM(s) Oral every 6 hours PRN  folic acid 1 milliGRAM(s) Oral every 24 hours  HYDROmorphone PCA (1 mG/mL) 30 milliLiter(s) PCA Continuous PCA Continuous  melatonin 3 milliGRAM(s) Oral at bedtime PRN  naloxone Injectable 0.1 milliGRAM(s) IV Push every 3 minutes PRN  ondansetron Injectable 4 milliGRAM(s) IV Push every 6 hours PRN  pantoprazole    Tablet 40 milliGRAM(s) Oral before breakfast  polyethylene glycol 3350 17 Gram(s) Oral daily  senna 2 Tablet(s) Oral at bedtime  sodium chloride 0.45%. 1000 milliLiter(s) IV Continuous <Continuous>      PMHX/PSHX:  Sickle cell anemia    Gout    Anemia requiring transfusions    Marijuana abuse    Pneumonia    History of cholecystectomy    Family history:  Family history of sickle cell trait (Father, Mother)    Patient's father is  (Father)    Patient's mother is  (Mother)    Social History:   No alcohol or smoking    ROS:   See HPI    PHYSICAL EXAM:   GENERAL:  NAD, resting comfortably in bed  HEENT:  Sclera icteric  RESPIRATORY:  Normal effort  CARDIAC:  HDS  ABDOMEN:  Soft, non-tender, non-distended  EXTREMITIES:  No edema  SKIN:  Warm & Dry. Jaundice.   NEURO:  Alert, conversant, no focal deficit, oriented, no asterixis    Vital Signs:  Vital Signs Last 24 Hrs  T(C): 36.8 (25 Oct 2024 07:11), Max: 37 (25 Oct 2024 06:19)  T(F): 98.3 (25 Oct 2024 07:11), Max: 98.6 (25 Oct 2024 06:19)  HR: 77 (25 Oct 2024 07:11) (77 - 125)  BP: 140/87 (25 Oct 2024 07:11) (130/80 - 160/84)  BP(mean): --  RR: 18 (25 Oct 2024 05:15) (18 - 20)  SpO2: 96% (25 Oct 2024 04:19) (95% - 98%)    Parameters below as of 25 Oct 2024 05:15  Patient On (Oxygen Delivery Method): room air    Daily     LABS:                        5.2    12.10 )-----------( 249      ( 24 Oct 2024 23:48 )             14.2     Mean Cell Volume: 85.0 fL (10-24-24 @ 23:48)    10-24    135  |  103  |  29[H]  ----------------------------<  102[H]  4.6   |  16[L]  |  1.55[H]    Ca    8.7      24 Oct 2024 23:48    TPro  7.1  /  Alb  3.8  /  TBili  7.9[H]  /  DBili  3.1[H]  /  AST  138[H]  /  ALT  49[H]  /  AlkPhos  143[H]  10-24    LIVER FUNCTIONS - ( 24 Oct 2024 23:48 )  Alb: 3.8 g/dL / Pro: 7.1 g/dL / ALK PHOS: 143 U/L / ALT: 49 U/L / AST: 138 U/L / GGT: x             Urinalysis Basic - ( 24 Oct 2024 23:48 )    Color: x / Appearance: x / SG: x / pH: x  Gluc: 102 mg/dL / Ketone: x  / Bili: x / Urobili: x   Blood: x / Protein: x / Nitrite: x   Leuk Esterase: x / RBC: x / WBC x   Sq Epi: x / Non Sq Epi: x / Bacteria: x                            5.2    12.10 )-----------( 249      ( 24 Oct 2024 23:48 )             14.2     Imaging:    CT Abdomen and Pelvis w/ IV Cont 10.25.24   LIVER: Hypertrophied of the lateral segment of the left hepatic lobe   along with hepatorenal notching, compatible with cirrhotic morphology.  BILE DUCTS: Normal caliber.  GALLBLADDER: Cholecystectomy.  SPLEEN: Calcified autoinfarcted spleen  PANCREAS: Within normal limits.  PERITONEUM/RETROPERITONEUM: Within normal limits.  VESSELS: Atherosclerotic changes.

## 2024-10-25 NOTE — H&P ADULT - PROBLEM SELECTOR PLAN 1
- Hb 5.2 on presentation, baseline Hb 6-6.5  - s/p 1U PRBC in ED – patient’s outpatient physician had recommended 2U PRBC, may offer additional transfusion based on repeat labs  - caution with excessive transfusion in setting of hemochromatosis – c/w home chelating agents  - trend CBC, retic count, LDH, ferritin  - hyperbilirubinemia noted, attributable to hemolysis – trend  - hypotonic IVF  - incentive spirometry  - analgesia with hydromorphone PCA: demand dose 0.5mg, lockout 6 minutes, continuous rate 0mg/hr, 4h limit of 20mg  - po diphenhydramine – no indication for IV

## 2024-10-25 NOTE — CONSULT NOTE ADULT - ASSESSMENT
Mr. 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 Hepatology is consulted for abnormal liver chemistry.    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 Current elevations in liver tests likely reflect active sickling rather than a new or different acute process. 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     CT Abdomen/Pelvis demonstrates cirrhosis without features of portal hypertension including ascites. MRCP in 3/2024 demonstrated cirrhotic morphology with iron deposition in the liver compatible with hemosiderosis from chronic transfusions. There is no evidence of clinical decompensation of patient's underlying cirrhosis such as ascites, hepatic encephalopathy or variceal hemorrhage. MELD 3.0 score is 23 using prior INR from August.       Recommendations:   - Obtain PT/INR   - US Liver Doppler to evaluate portal/hepatic vasculature for thrombosis  - Trend PT/INR, CMP and direct bilirubin daily  - Management of sickle crisis and MATA per primary   - Supportive care  - Follow up with Hepatology upon discharge    Follow up in Hepatology Clinic: [phone] ([facility] at [address] [place] [address]) or [phone] ([facility] at [address] [place] [address]) or [phone] ([facility] at [address] [place] [address])       Brandon Nguyen MD  Gastroenterology/Hepatology Fellow  Available via Microsoft Teams  Pager: [phone]    On Weekdays after 5 PM to 8AM and Weekends/Holidays (All day)  For non-urgent consults, please email [email or web address] or [email or web address]  For urgent consults, please contact on call GI team.  See Amion schedule ([facility]), DocuSpeaking system - [phone] (MILLA), or call hospital  ([facility]) Mr. 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 Hepatology is consulted for abnormal liver chemistry.    #Compensated Cirrhosis  #Sickle Cell disease c/b sickle cell crisis   #Hemosiderosis - on Jadenu   Patient has elevations in liver enzymes slightly above his baseline in the setting of sickle cell crisis with mixed hyperbilirubinemia due to hemolysis and underlying liver disease. Current elevations in liver tests likely reflect active sickling rather than a new or different acute process. Will recommend a viral work-up to exclude acute viral illness. 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 A work-up for alternate etiologies of acute and chronic liver disease was completed during a recent admission in August.     CTA Abdomen/Pelvis demonstrates cirrhosis without features of portal hypertension including ascites. Vessels are clear without thrombosis. MRCP in 3/2024 demonstrated cirrhotic morphology with iron deposition in the liver compatible with hemosiderosis from chronic transfusions. There is no evidence of clinical decompensation of patient's underlying cirrhosis such as ascites, hepatic encephalopathy or variceal hemorrhage. MELD 3.0 score is 23 using prior INR from August.    Patient was counseled on his diagnosis of cirrhosis, potential complications and need for long-term regular follow-up for monitoring and management of his liver condition.     Recommendations:   - Check acute viral hepatitis panel, HSV PCR, EBV PCR & CMV PCR  - Obtain PT/INR   - Trend PT/INR, CMP and direct bilirubin daily  - Management of sickle cell crisis and MATA per primary & Hematology  - Supportive care  - Ensure follow up with Hepatology upon discharge for long-term care    Follow up in Hepatology Clinic: [phone] ([facility] at [address] [place] [address]) or [phone] ([facility] at [address] [place] [address]) or [phone] ([facility] at [address] [place] [address])     Brandon Nguyen MD  Gastroenterology/Hepatology Fellow  Available via Microsoft Teams  Pager: [phone]    On Weekdays after 5 PM to 8AM and Weekends/Holidays (All day)  For non-urgent consults, please email [email or web address] or [email or web address]  For urgent consults, please contact on call GI team.  See Rubi schedule ([facility]), Roundboxing system - [phone] ([facility]), or call hospital  ([facility]) Mr. 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 Hepatology is consulted for abnormal liver chemistry.    #Compensated Cirrhosis  #Sickle Cell disease c/b sickle cell crisis   #Hemosiderosis - on Jadenu   #MATA  Patient has elevations in liver enzymes slightly above his baseline in the setting of sickle cell crisis with mixed hyperbilirubinemia due to hemolysis and underlying liver disease. Current elevations in liver tests likely reflect active sickling rather than a new or different acute process. Will recommend a viral work-up to exclude acute viral illness. 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 A work-up for alternate etiologies of acute and chronic liver disease was completed during a recent admission in August.     CTA Abdomen/Pelvis demonstrates cirrhosis without features of portal hypertension including ascites. Vessels are clear without thrombosis. MRCP in 3/2024 demonstrated cirrhotic morphology with iron deposition in the liver compatible with hemosiderosis from chronic transfusions. There is no evidence of clinical decompensation of patient's underlying cirrhosis such as ascites, hepatic encephalopathy or variceal hemorrhage. MELD 3.0 score is 23 using prior INR from August.    Patient was counseled on his diagnosis of cirrhosis, potential complications and need for long-term regular follow-up for monitoring and management of his liver condition.     Recommendations:   - Check acute viral hepatitis panel, HSV PCR, EBV PCR & CMV PCR  - Obtain PT/INR   - Trend PT/INR, CMP and direct bilirubin daily  - Management of sickle cell crisis and MATA per primary & Hematology  - Supportive care  - Ensure follow up with Hepatology upon discharge for long-term care    Follow up in Hepatology Clinic: [phone] ([facility] at [address] [place] [address]) or [phone] ([facility] at [address] [place] [address]) or [phone] ([facility] at [address] [place] [address])     Brandon Nguyen MD  Gastroenterology/Hepatology Fellow  Available via Microsoft Teams  Pager: [phone]    On Weekdays after 5 PM to 8AM and Weekends/Holidays (All day)  For non-urgent consults, please email [email or web address] or [email or web address]  For urgent consults, please contact on call GI team.  See Rubi almanza ([facility]), [place] system - [phone] ([facility]), or call hospital  ([facility])

## 2024-10-25 NOTE — H&P ADULT - NSHPPHYSICALEXAM_GEN_ALL_CORE
PHYSICAL EXAM:  Vital Signs Last 24 Hrs  T(C): 36.8 (25 Oct 2024 07:11), Max: 37 (25 Oct 2024 06:19)  T(F): 98.3 (25 Oct 2024 07:11), Max: 98.6 (25 Oct 2024 06:19)  HR: 77 (25 Oct 2024 07:11) (77 - 125)  BP: 140/87 (25 Oct 2024 07:11) (130/80 - 160/84)  BP(mean): --  RR: 18 (25 Oct 2024 05:15) (18 - 20)  SpO2: 96% (25 Oct 2024 04:19) (95% - 98%)    Parameters below as of 25 Oct 2024 05:15  Patient On (Oxygen Delivery Method): room air      CONSTITUTIONAL: NAD, well-groomed  EYES: PERRLA; conjunctiva and sclera clear  ENMT: Moist oral mucosa, no pharyngeal injection or exudates  NECK: Supple, no palpable masses; no thyromegaly  RESPIRATORY: Normal respiratory effort; lungs are clear to auscultation bilaterally  CARDIOVASCULAR: Regular rate and rhythm, normal S1 and S2, no murmur/rub/gallop; 1+ pitting b/l lower extremity edema; Peripheral pulses are 2+ bilaterally  ABDOMEN: Nontender to palpation, normoactive bowel sounds, no rebound/guarding; No hepatosplenomegaly  MUSCULOSKELETAL: no clubbing or cyanosis of digits; no joint swelling or tenderness to palpation  PSYCH: A+O to person, place, and time; affect appropriate  NEUROLOGY: CN 2-12 are intact and symmetric; no gross sensory deficits   SKIN: No rashes; no palpable lesions

## 2024-10-26 LAB
-  STAPHYLOCOCCUS EPIDERMIDIS: SIGNIFICANT CHANGE UP
ALBUMIN SERPL ELPH-MCNC: 3.6 G/DL — SIGNIFICANT CHANGE UP (ref 3.3–5)
ALP SERPL-CCNC: 137 U/L — HIGH (ref 40–120)
ALT FLD-CCNC: 40 U/L — SIGNIFICANT CHANGE UP (ref 4–41)
ANION GAP SERPL CALC-SCNC: 13 MMOL/L — SIGNIFICANT CHANGE UP (ref 7–14)
AST SERPL-CCNC: 111 U/L — HIGH (ref 4–40)
BASOPHILS # BLD AUTO: 0.09 K/UL — SIGNIFICANT CHANGE UP (ref 0–0.2)
BASOPHILS NFR BLD AUTO: 0.8 % — SIGNIFICANT CHANGE UP (ref 0–2)
BILIRUB SERPL-MCNC: 6.2 MG/DL — HIGH (ref 0.2–1.2)
BUN SERPL-MCNC: 23 MG/DL — SIGNIFICANT CHANGE UP (ref 7–23)
CALCIUM SERPL-MCNC: 8.6 MG/DL — SIGNIFICANT CHANGE UP (ref 8.4–10.5)
CHLORIDE SERPL-SCNC: 105 MMOL/L — SIGNIFICANT CHANGE UP (ref 98–107)
CO2 SERPL-SCNC: 18 MMOL/L — LOW (ref 22–31)
CREAT SERPL-MCNC: 0.83 MG/DL — SIGNIFICANT CHANGE UP (ref 0.5–1.3)
CULTURE RESULTS: ABNORMAL
EGFR: 110 ML/MIN/1.73M2 — SIGNIFICANT CHANGE UP
EOSINOPHIL # BLD AUTO: 0.85 K/UL — HIGH (ref 0–0.5)
EOSINOPHIL NFR BLD AUTO: 7.9 % — HIGH (ref 0–6)
GLUCOSE SERPL-MCNC: 113 MG/DL — HIGH (ref 70–99)
GRAM STN FLD: ABNORMAL
HAV IGM SER-ACNC: SIGNIFICANT CHANGE UP
HBV CORE IGM SER-ACNC: SIGNIFICANT CHANGE UP
HBV SURFACE AG SER-ACNC: SIGNIFICANT CHANGE UP
HCT VFR BLD CALC: 18.2 % — CRITICAL LOW (ref 39–50)
HCV AB S/CO SERPL IA: 0.09 S/CO — SIGNIFICANT CHANGE UP (ref 0–0.99)
HCV AB SERPL-IMP: SIGNIFICANT CHANGE UP
HGB BLD-MCNC: 6.6 G/DL — CRITICAL LOW (ref 13–17)
IANC: 5.73 K/UL — SIGNIFICANT CHANGE UP (ref 1.8–7.4)
IMM GRANULOCYTES NFR BLD AUTO: 0.7 % — SIGNIFICANT CHANGE UP (ref 0–0.9)
LYMPHOCYTES # BLD AUTO: 2.7 K/UL — SIGNIFICANT CHANGE UP (ref 1–3.3)
LYMPHOCYTES # BLD AUTO: 25.2 % — SIGNIFICANT CHANGE UP (ref 13–44)
MCHC RBC-ENTMCNC: 31.1 PG — SIGNIFICANT CHANGE UP (ref 27–34)
MCHC RBC-ENTMCNC: 36.3 GM/DL — HIGH (ref 32–36)
MCV RBC AUTO: 85.8 FL — SIGNIFICANT CHANGE UP (ref 80–100)
METHOD TYPE: SIGNIFICANT CHANGE UP
MONOCYTES # BLD AUTO: 1.27 K/UL — HIGH (ref 0–0.9)
MONOCYTES NFR BLD AUTO: 11.9 % — SIGNIFICANT CHANGE UP (ref 2–14)
NEUTROPHILS # BLD AUTO: 5.73 K/UL — SIGNIFICANT CHANGE UP (ref 1.8–7.4)
NEUTROPHILS NFR BLD AUTO: 53.5 % — SIGNIFICANT CHANGE UP (ref 43–77)
NRBC # BLD: 4 /100 WBCS — HIGH (ref 0–0)
NRBC # FLD: 0.48 K/UL — HIGH (ref 0–0)
PLATELET # BLD AUTO: 256 K/UL — SIGNIFICANT CHANGE UP (ref 150–400)
POTASSIUM SERPL-MCNC: 4.8 MMOL/L — SIGNIFICANT CHANGE UP (ref 3.5–5.3)
POTASSIUM SERPL-SCNC: 4.8 MMOL/L — SIGNIFICANT CHANGE UP (ref 3.5–5.3)
PROT SERPL-MCNC: 6.9 G/DL — SIGNIFICANT CHANGE UP (ref 6–8.3)
RBC # BLD: 2.12 M/UL — LOW (ref 4.2–5.8)
RBC # BLD: 2.12 M/UL — LOW (ref 4.2–5.8)
RBC # FLD: 26.1 % — HIGH (ref 10.3–14.5)
RETICS #: 368.6 K/UL — HIGH (ref 25–125)
RETICS/RBC NFR: 17.5 % — HIGH (ref 0.5–2.5)
SODIUM SERPL-SCNC: 136 MMOL/L — SIGNIFICANT CHANGE UP (ref 135–145)
SPECIMEN SOURCE: SIGNIFICANT CHANGE UP
WBC # BLD: 10.71 K/UL — HIGH (ref 3.8–10.5)
WBC # FLD AUTO: 10.71 K/UL — HIGH (ref 3.8–10.5)

## 2024-10-26 PROCEDURE — 99233 SBSQ HOSP IP/OBS HIGH 50: CPT

## 2024-10-26 PROCEDURE — 99222 1ST HOSP IP/OBS MODERATE 55: CPT

## 2024-10-26 RX ORDER — VANCOMYCIN HYDROCHLORIDE 50 MG/ML
1000 KIT ORAL ONCE
Refills: 0 | Status: COMPLETED | OUTPATIENT
Start: 2024-10-26 | End: 2024-10-26

## 2024-10-26 RX ORDER — VANCOMYCIN HYDROCHLORIDE 50 MG/ML
1000 KIT ORAL EVERY 12 HOURS
Refills: 0 | Status: DISCONTINUED | OUTPATIENT
Start: 2024-10-26 | End: 2024-10-31

## 2024-10-26 RX ADMIN — Medication 100 MILLILITER(S): at 21:25

## 2024-10-26 RX ADMIN — Medication 2 TABLET(S): at 21:24

## 2024-10-26 RX ADMIN — VANCOMYCIN HYDROCHLORIDE 250 MILLIGRAM(S): KIT at 17:37

## 2024-10-26 RX ADMIN — Medication 50 MILLIGRAM(S): at 11:56

## 2024-10-26 RX ADMIN — Medication 30 MILLILITER(S): at 21:50

## 2024-10-26 RX ADMIN — Medication 100 MILLILITER(S): at 17:38

## 2024-10-26 RX ADMIN — FOLIC ACID 1 MILLIGRAM(S): 1 TABLET ORAL at 11:56

## 2024-10-26 RX ADMIN — Medication 30 MILLILITER(S): at 07:38

## 2024-10-26 RX ADMIN — VANCOMYCIN HYDROCHLORIDE 250 MILLIGRAM(S): KIT at 03:57

## 2024-10-26 RX ADMIN — Medication 100 MILLILITER(S): at 04:01

## 2024-10-26 RX ADMIN — Medication 30 MILLILITER(S): at 01:16

## 2024-10-26 RX ADMIN — Medication 30 MILLILITER(S): at 19:34

## 2024-10-26 RX ADMIN — PANTOPRAZOLE SODIUM 40 MILLIGRAM(S): 40 TABLET, DELAYED RELEASE ORAL at 05:12

## 2024-10-26 NOTE — PROGRESS NOTE ADULT - PROBLEM SELECTOR PLAN 5
CT angio chest negative for PE  CXR without consolidations  RVP negative  Patient with progressive dyspnea.   repeat CXR on 8/20 showing increased haziness in RLL. ?developing pleural eff.   proBNP elevated. c/t fluid OL iso underline cirrhosis.   - echo nl LVEF, grade 1 diastolic dysfunction.   - c/w diuretics.

## 2024-10-26 NOTE — CONSULT NOTE ADULT - SUBJECTIVE AND OBJECTIVE BOX
Patient is a 44y old  Male who presents with a chief complaint of sickle cell crisis (25 Oct 2024 10:05)    HPI:  Patient is a 43yo M with PMH significant for sickle cell anemia, cirrhosis, hemochromatosis, and gout who presented with a low hemoglobin count from outpatient, also reporting worsening for lower back and lower extremity pain similar to prior sickle cell crises. He reports ongoing pain. Denies chest pain, but admits to some exertional dyspnea which he attributes to his acute anemia. He was seen by his outpatient physician and found to be anemic, recommended for 2U PRBC transfusion.      ED/hospital course: afebrile, hypertensive, SpO2 96% on 2L NC. Monitored off antibiotics. Labs with anemia, elevated LFTS. Overnight blood cx with GPC, a dose of vancomycin was given. ID asked to evaluate.    Bcx 10/25 4/4 Staph epidermidis.        prior hospital charts reviewed [  ]  primary team notes reviewed [x  ]  other consultant notes reviewed [  ]    PAST MEDICAL & SURGICAL HISTORY:  Sickle cell anemia      Gout      History of cholecystectomy          Allergies  ceftriaxone (Anaphylaxis)  piperacillin-tazobactam (Urticaria)  penicillin (Pruritus)  hydroxyurea (Other)    ANTIMICROBIALS (past 90 days)  MEDICATIONS  (STANDING):  vancomycin  IVPB   250 mL/Hr IV Intermittent (10-26-24 @ 03:57)          MEDICATIONS  (STANDING):  acetaminophen     Tablet .. 650 every 6 hours PRN  allopurinol 50 daily  diphenhydrAMINE 25 every 6 hours PRN  HYDROmorphone PCA (1 mG/mL) 30 PCA Continuous  melatonin 3 at bedtime PRN  ondansetron Injectable 4 every 6 hours PRN  pantoprazole    Tablet 40 before breakfast  polyethylene glycol 3350 17 daily  senna 2 at bedtime    SOCIAL HISTORY:       FAMILY HISTORY:  Family history of sickle cell trait (Father, Mother)    Patient's father is  (Father)    Patient's mother is  (Mother)      REVIEW OF SYSTEMS  [  ] ROS unobtainable because:    [  ] All other systems negative except as noted below:	    Constitutional:  [ ] fever [ ] chills  [ ] weight loss  [ ] weakness  Skin:  [ ] rash [ ] phlebitis	  Eyes: [ ] icterus [ ] pain  [ ] discharge	  ENMT: [ ] sore throat  [ ] thrush [ ] ulcers [ ] exudates  Respiratory: [ ] dyspnea [ ] hemoptysis [ ] cough [ ] sputum	  Cardiovascular:  [ ] chest pain [ ] palpitations [ ] edema	  Gastrointestinal:  [ ] nausea [ ] vomiting [ ] diarrhea [ ] constipation [ ] pain	  Genitourinary:  [ ] dysuria [ ] frequency [ ] hematuria [ ] discharge [ ] flank pain  [ ] incontinence  Musculoskeletal:  [ ] myalgias [ ] arthralgias [ ] arthritis  [ ] back pain  Neurological:  [ ] headache [ ] seizures  [ ] confusion/altered mental status  Psychiatric:  [ ] anxiety [ ] depression	  Hematology/Lymphatics:  [ ] lymphadenopathy  Endocrine:  [ ] adrenal [ ] thyroid  Allergic/Immunologic:	 [ ] transplant [ ] seasonal    Vital Signs Last 24 Hrs  T(F): 97.5 (10-26-24 @ 10:30), Max: 98.6 (10-25-24 @ 06:19)  Vital Signs Last 24 Hrs  HR: 85 (10-26-24 @ 10:30) (79 - 97)  BP: 144/90 (10-26-24 @ 10:30) (137/70 - 155/90)  RR: 18 (10-26-24 @ 10:30)  SpO2: 95% (10-26-24 @ 10:30) (95% - 96%)  Wt(kg): --    PHYSICAL EXAM:                              6.6    10.71 )-----------( 256      ( 26 Oct 2024 06:57 )             18.2   10    136  |  106  |  28[H]  ----------------------------<  89  4.4   |  17[L]  |  1.24    Ca    8.6      25 Oct 2024 10:32    TPro  6.8  /  Alb  3.7  /  TBili  7.6[H]  /  DBili  x   /  AST  123[H]  /  ALT  46[H]  /  AlkPhos  134[H]  10-25    Urinalysis Basic - ( 25 Oct 2024 10:32 )    Color: x / Appearance: x / SG: x / pH: x  Gluc: 89 mg/dL / Ketone: x  / Bili: x / Urobili: x   Blood: x / Protein: x / Nitrite: x   Leuk Esterase: x / RBC: x / WBC x   Sq Epi: x / Non Sq Epi: x / Bacteria: x    MICROBIOLOGY:  Culture - Blood (collected 25 Oct 2024 01:23)  Source: .Blood BLOOD  Gram Stain (26 Oct 2024 04:46):    Growth in aerobic bottle: Gram Positive Cocci in Clusters    Growth in anaerobic bottle: Gram Positive Cocci in Clusters  Preliminary Report (26 Oct 2024 04:46):    Growth in aerobic bottle: Gram Positive Cocci in Clusters    Growth in anaerobic bottle: Gram Positive Cocci in Clusters    Culture - Blood (collected 25 Oct 2024 01:23)  Source: .Blood BLOOD  Gram Stain (26 Oct 2024 05:41):    Growth in aerobic bottle: Gram Positive Cocci in Clusters    Growth in anaerobic bottle: Gram Positive Cocci in Clusters  Preliminary Report (26 Oct 2024 05:42):    Growth in aerobic bottle: Gram Positive Cocci in Clusters    Growth in anaerobic bottle: Gram Positive Cocci in Clusters    Direct identification is available within approximately 3-5    hours either by Blood Panel Multiplexed PCR or Direct    MALDI-TOF. Details: [email or web address]  Organism: Blood Culture PCR (26 Oct 2024 04:16)  Organism: Blood Culture PCR (26 Oct 2024 04:16)      Method Type: PCR      -  Staphylococcus epidermidis: Detec                  RADIOLOGY:  imaging below personally reviewed and agree with findings    < from: CT Abdomen and Pelvis w/ IV Cont (10..24 @ 03:11) >    ACC: [id number] EXAM:  CT ABDOMEN AND PELVIS IC   ORDERED BY: RAYMOND DOLL     ACC: [id number] EXAM:  CT ANGIO CHEST PULM ART [facility]   ORDERED BY: RAYMOND DOLL     PROCEDURE DATE:  10/25/2024          INTERPRETATION:  CLINICAL INFORMATION:Sickle cell. Evaluate for PE   versus acute chest syndrome.    COMPARISON: CTA chest 2024, CT abdomen pelvis 10/18/2023    CONTRAST/COMPLICATIONS:  IV Contrast: Omnipaque 350 (accession [id number]), IV contrast documented   in unlinked concurrent exam (accession [id number])  90 cc administered   10   cc discarded  Oral Contrast: NONE  Complications: None reported at time of study completion    PROCEDURE:  CT Angiography of the Chest was performed followed by portal venous phase   imaging of the Abdomen and Pelvis.  Sagittal and coronal reformats were performed as well as 3D (MIP)   reconstructions.    FINDINGS:  CHEST:  LUNGS AND LARGE AIRWAYS: Patent central airways. Scattered bilateral   subsegmental atelectasis..  PLEURA: Trace bilateral pleural effusions  VESSELS:  No main, lobar, segmental pulmonary embolism.  HEART: Cardiomegaly. No pericardial effusion.  MEDIASTINUM AND MYRON: Stable mildly enlarged mediastinal lymph nodes. For   reference a right upper paratracheal node measures 1.8 x 1.7 cm. And   gastroesophageal node measures 2.0 x 1.4 cm..  CHEST WALL AND LOWER NECK: Right anterior Mediport catheter with tip in   the cava atrial junction.    ABDOMEN AND PELVIS:  LIVER: Hypertrophied of the lateral segment of the left hepatic lobe   along with hepatorenal notching, compatible with cirrhotic morphology.  BILE DUCTS: Normal caliber.  GALLBLADDER: Cholecystectomy.  SPLEEN: Calcified autoinfarcted spleen  PANCREAS: Within normal limits.  ADRENALS: Within normal limits.  KIDNEYS/URETERS: Symmetric renal enhancement. No hydronephrosis. Left   renal cyst.    BLADDER: Within normal limits.  REPRODUCTIVE ORGANS: Prostate within normal limits.    BOWEL: No bowel obstruction. Appendix is normal.  PERITONEUM/RETROPERITONEUM: Within normal limits.  VESSELS: Atherosclerotic changes.  LYMPH NODES: Stable upper abdominal lymphadenopathy. For example   gastrohepatic nodes measure up to 2.8 x 1.9 cm. A peripancreatic node   measures 3.1 x 1.7 cm. Periportal nodes measure up to 2.6 x 1.9 cm.  ABDOMINAL WALL: Small fat-containing left inguinal hernia.  BONES: Healed right anterior fifth through seventh rib fractures. Stable   compression deformity of T12 and L2.    IMPRESSION:  No pulmonary embolus.    Scattered bilateral subsegmental opacities may represent atelectasis.    Stable upper abdominal lymphadenopathy.        --- End of Report ---          < end of copied text >  < from: CT Angio Chest PE Protocol w/ IV Cont (10.25.24 @ 03:11) >    ACC: [id number] EXAM:  CT ABDOMEN AND PELVIS IC   ORDERED BY: RAYMOND DOLL     ACC: [id number] EXAM:  CT ANGIO CHEST PULM [facility]   ORDERED BY: RAYMOND DOLL     PROCEDURE DATE:  10/25/2024          INTERPRETATION:  CLINICAL INFORMATION:Sickle cell. Evaluate for PE   versus acute chest syndrome.    COMPARISON: CTA chest 2024, CT abdomen pelvis 10/18/2023    CONTRAST/COMPLICATIONS:  IV Contrast: Omnipaque 350 (accession [id number]), IV contrast documented   in unlinked concurrent exam (accession [id number])  90 cc administered   10   cc discarded  Oral Contrast: NONE  Complications: None reported at time of study completion    PROCEDURE:  CT Angiography of the Chest was performed followed by portal venous phase   imaging of the Abdomen and Pelvis.  Sagittal and coronal reformats were performed as well as 3D (MIP)   reconstructions.    FINDINGS:  CHEST:  LUNGS AND LARGE AIRWAYS: Patent central airways. Scattered bilateral   subsegmental atelectasis..  PLEURA: Trace bilateral pleural effusions  VESSELS:  No main, lobar, segmental pulmonary embolism.  HEART: Cardiomegaly. No pericardial effusion.  MEDIASTINUM AND MYRON: Stable mildly enlarged mediastinal lymph nodes. For   reference a right upper paratracheal node measures 1.8 x 1.7 cm. And   gastroesophageal node measures 2.0 x 1.4 cm..  CHEST WALL AND LOWER NECK: Right anterior Mediport catheter with tip in   the cava atrial junction.    ABDOMEN AND PELVIS:  LIVER: Hypertrophied of the lateral segment of the left hepatic lobe   along with hepatorenal notching, compatible with cirrhotic morphology.  BILE DUCTS: Normal caliber.  GALLBLADDER: Cholecystectomy.  SPLEEN: Calcified autoinfarcted spleen  PANCREAS: Within normal limits.  ADRENALS: Within normal limits.  KIDNEYS/URETERS: Symmetric renal enhancement. No hydronephrosis. Left   renal cyst.    BLADDER: Within normal limits.  REPRODUCTIVE ORGANS: Prostate within normal limits.    BOWEL: No bowel obstruction. Appendix is normal.  PERITONEUM/RETROPERITONEUM: Within normal limits.  VESSELS: Atherosclerotic changes.  LYMPH NODES: Stable upper abdominal lymphadenopathy. For example   gastrohepatic nodes measure up to 2.8 x 1.9 cm. A peripancreatic node   measures 3.1 x 1.7 cm. Periportal nodes measure up to 2.6 x 1.9 cm.  ABDOMINAL WALL: Small fat-containing left inguinal hernia.  BONES: Healed right anterior fifth through seventh rib fractures. Stable   compression deformity of T12 and L2.    IMPRESSION:  No pulmonary embolus.    Scattered bilateral subsegmental opacities may represent atelectasis.    Stable upper abdominal lymphadenopathy.        --- End of Report ---   Patient is a 44y old  Male who presents with a chief complaint of sickle cell crisis (25 Oct 2024 10:05)    HPI:  Patient is a 43yo M with PMH significant for sickle cell anemia, cirrhosis, hemochromatosis, and gout who presented with a low hemoglobin count from outpatient, also reporting worsening for lower back and lower extremity pain similar to prior sickle cell crises. He reports ongoing pain. Denies chest pain, but admits to some exertional dyspnea which he attributes to his acute anemia. He was seen by his outpatient physician and found to be anemic, recommended for 2U PRBC transfusion.      ED/hospital course: afebrile, hypertensive, SpO2 96% on 2L NC. Monitored off antibiotics. Labs with anemia, elevated LFTS. Overnight blood cx with GPC, a dose of vancomycin was given. ID asked to evaluate.    He has a port that has been there for > 10 yrs received BT every 2 months. Never had issues with the line before    Bcx 10/25 4/4 Staph epidermidis.        prior hospital charts reviewed [  ]  primary team notes reviewed [x  ]  other consultant notes reviewed [  ]    PAST MEDICAL & SURGICAL HISTORY:  Sickle cell anemia      Gout      History of cholecystectomy          Allergies  ceftriaxone (Anaphylaxis)  piperacillin-tazobactam (Urticaria)  penicillin (Pruritus)  hydroxyurea (Other)    ANTIMICROBIALS (past 90 days)  MEDICATIONS  (STANDING):  vancomycin  IVPB   250 mL/Hr IV Intermittent (10-26-24 @ 03:57)          MEDICATIONS  (STANDING):  acetaminophen     Tablet .. 650 every 6 hours PRN  allopurinol 50 daily  diphenhydrAMINE 25 every 6 hours PRN  HYDROmorphone PCA (1 mG/mL) 30 PCA Continuous  melatonin 3 at bedtime PRN  ondansetron Injectable 4 every 6 hours PRN  pantoprazole    Tablet 40 before breakfast  polyethylene glycol 3350 17 daily  senna 2 at bedtime    SOCIAL HISTORY:       FAMILY HISTORY:  Family history of sickle cell trait (Father, Mother)    Patient's father is  (Father)    Patient's mother is  (Mother)      REVIEW OF SYSTEMS  [  ] ROS unobtainable because:    [  ] All other systems negative except as noted below:	    Constitutional:  [ ] fever [ ] chills  [ ] weight loss  [ ] weakness  Skin:  [ ] rash [ ] phlebitis	  Eyes: [ ] icterus [ ] pain  [ ] discharge	  ENMT: [ ] sore throat  [ ] thrush [ ] ulcers [ ] exudates  Respiratory: [ ] dyspnea [ ] hemoptysis [ ] cough [ ] sputum	  Cardiovascular:  [ ] chest pain [ ] palpitations [ ] edema	  Gastrointestinal:  [ ] nausea [ ] vomiting [ ] diarrhea [ ] constipation [ ] pain	  Genitourinary:  [ ] dysuria [ ] frequency [ ] hematuria [ ] discharge [ ] flank pain  [ ] incontinence  Musculoskeletal:  [ ] myalgias [ ] arthralgias [ ] arthritis  [ ] back pain  Neurological:  [ ] headache [ ] seizures  [ ] confusion/altered mental status  Psychiatric:  [ ] anxiety [ ] depression	  Hematology/Lymphatics:  [ ] lymphadenopathy  Endocrine:  [ ] adrenal [ ] thyroid  Allergic/Immunologic:	 [ ] transplant [ ] seasonal    Vital Signs Last 24 Hrs  T(F): 97.5 (10-26-24 @ 10:30), Max: 98.6 (10-25-24 @ 06:19)  Vital Signs Last 24 Hrs  HR: 85 (10-26-24 @ 10:30) (79 - 97)  BP: 144/90 (10-26-24 @ 10:30) (137/70 - 155/90)  RR: 18 (10-26-24 @ 10:30)  SpO2: 95% (10-26-24 @ 10:30) (95% - 96%)  Wt(kg): --    PHYSICAL EXAM:                              6.6    10.71 )-----------( 256      ( 26 Oct 2024 06:57 )             18.2   10-25    136  |  106  |  28[H]  ----------------------------<  89  4.4   |  17[L]  |  1.24    Ca    8.6      25 Oct 2024 10:32    TPro  6.8  /  Alb  3.7  /  TBili  7.6[H]  /  DBili  x   /  AST  123[H]  /  ALT  46[H]  /  AlkPhos  134[H]  10    Urinalysis Basic - ( 25 Oct 2024 10:32 )    Color: x / Appearance: x / SG: x / pH: x  Gluc: 89 mg/dL / Ketone: x  / Bili: x / Urobili: x   Blood: x / Protein: x / Nitrite: x   Leuk Esterase: x / RBC: x / WBC x   Sq Epi: x / Non Sq Epi: x / Bacteria: x    MICROBIOLOGY:  Culture - Blood (collected 25 Oct 2024 01:23)  Source: .Blood BLOOD  Gram Stain (26 Oct 2024 04:46):    Growth in aerobic bottle: Gram Positive Cocci in Clusters    Growth in anaerobic bottle: Gram Positive Cocci in Clusters  Preliminary Report (26 Oct 2024 04:46):    Growth in aerobic bottle: Gram Positive Cocci in Clusters    Growth in anaerobic bottle: Gram Positive Cocci in Clusters    Culture - Blood (collected 25 Oct 2024 01:23)  Source: .Blood BLOOD  Gram Stain (26 Oct 2024 05:41):    Growth in aerobic bottle: Gram Positive Cocci in Clusters    Growth in anaerobic bottle: Gram Positive Cocci in Clusters  Preliminary Report (26 Oct 2024 05:42):    Growth in aerobic bottle: Gram Positive Cocci in Clusters    Growth in anaerobic bottle: Gram Positive Cocci in Clusters    Direct identification is available within approximately 3-5    hours either by Blood Panel Multiplexed PCR or Direct    MALDI-TOF. Details: [email or web address]  Organism: Blood Culture PCR (26 Oct 2024 04:16)  Organism: Blood Culture PCR (26 Oct 2024 04:16)      Method Type: PCR      -  Staphylococcus epidermidis: Detec                  RADIOLOGY:  imaging below personally reviewed and agree with findings    < from: CT Abdomen and Pelvis w/ IV Cont (10.25.24 @ 03:11) >    ACC: [id number] EXAM:  CT ABDOMEN AND PELVIS IC   ORDERED BY: RAYMOND DOLL     ACC: [id number] EXAM:  CT ANGIO CHEST PULM [facility]   ORDERED BY: RAYMOND DOLL     PROCEDURE DATE:  10/25/2024          INTERPRETATION:  CLINICAL INFORMATION:Sickle cell. Evaluate for PE   versus acute chest syndrome.    COMPARISON: CTA chest 2024, CT abdomen pelvis 10/18/2023    CONTRAST/COMPLICATIONS:  IV Contrast: Omnipaque 350 (accession [id number]), IV contrast documented   in unlinked concurrent exam (accession [id number])  90 cc administered   10   cc discarded  Oral Contrast: NONE  Complications: None reported at time of study completion    PROCEDURE:  CT Angiography of the Chest was performed followed by portal venous phase   imaging of the Abdomen and Pelvis.  Sagittal and coronal reformats were performed as well as 3D (MIP)   reconstructions.    FINDINGS:  CHEST:  LUNGS AND LARGE AIRWAYS: Patent central airways. Scattered bilateral   subsegmental atelectasis..  PLEURA: Trace bilateral pleural effusions  VESSELS:  No main, lobar, segmental pulmonary embolism.  HEART: Cardiomegaly. No pericardial effusion.  MEDIASTINUM AND MYRON: Stable mildly enlarged mediastinal lymph nodes. For   reference a right upper paratracheal node measures 1.8 x 1.7 cm. And   gastroesophageal node measures 2.0 x 1.4 cm..  CHEST WALL AND LOWER NECK: Right anterior Mediport catheter with tip in   the cava atrial junction.    ABDOMEN AND PELVIS:  LIVER: Hypertrophied of the lateral segment of the left hepatic lobe   along with hepatorenal notching, compatible with cirrhotic morphology.  BILE DUCTS: Normal caliber.  GALLBLADDER: Cholecystectomy.  SPLEEN: Calcified autoinfarcted spleen  PANCREAS: Within normal limits.  ADRENALS: Within normal limits.  KIDNEYS/URETERS: Symmetric renal enhancement. No hydronephrosis. Left   renal cyst.    BLADDER: Within normal limits.  REPRODUCTIVE ORGANS: Prostate within normal limits.    BOWEL: No bowel obstruction. Appendix is normal.  PERITONEUM/RETROPERITONEUM: Within normal limits.  VESSELS: Atherosclerotic changes.  LYMPH NODES: Stable upper abdominal lymphadenopathy. For example   gastrohepatic nodes measure up to 2.8 x 1.9 cm. A peripancreatic node   measures 3.1 x 1.7 cm. Periportal nodes measure up to 2.6 x 1.9 cm.  ABDOMINAL WALL: Small fat-containing left inguinal hernia.  BONES: Healed right anterior fifth through seventh rib fractures. Stable   compression deformity of T12 and L2.    IMPRESSION:  No pulmonary embolus.    Scattered bilateral subsegmental opacities may represent atelectasis.    Stable upper abdominal lymphadenopathy.        --- End of Report ---          < end of copied text >  < from: CT Angio Chest PE Protocol w/ IV Cont (10.25. @ 03:11) >    ACC: [id number] EXAM:  CT ABDOMEN AND PELVIS IC   ORDERED BY: RAYMOND DOLL     ACC: [id number] EXAM:  CT ANGIO CHEST PULM ART [facility]   ORDERED BY: RAYMOND DOLL     PROCEDURE DATE:  10/25/2024          INTERPRETATION:  CLINICAL INFORMATION:Sickle cell. Evaluate for PE   versus acute chest syndrome.    COMPARISON: CTA chest 2024, CT abdomen pelvis 10/18/2023    CONTRAST/COMPLICATIONS:  IV Contrast: Omnipaque 350 (accession [id number]), IV contrast documented   in unlinked concurrent exam (accession [id number])  90 cc administered   10   cc discarded  Oral Contrast: NONE  Complications: None reported at time of study completion    PROCEDURE:  CT Angiography of the Chest was performed followed by portal venous phase   imaging of the Abdomen and Pelvis.  Sagittal and coronal reformats were performed as well as 3D (MIP)   reconstructions.    FINDINGS:  CHEST:  LUNGS AND LARGE AIRWAYS: Patent central airways. Scattered bilateral   subsegmental atelectasis..  PLEURA: Trace bilateral pleural effusions  VESSELS:  No main, lobar, segmental pulmonary embolism.  HEART: Cardiomegaly. No pericardial effusion.  MEDIASTINUM AND MYRON: Stable mildly enlarged mediastinal lymph nodes. For   reference a right upper paratracheal node measures 1.8 x 1.7 cm. And   gastroesophageal node measures 2.0 x 1.4 cm..  CHEST WALL AND LOWER NECK: Right anterior Mediport catheter with tip in   the cava atrial junction.    ABDOMEN AND PELVIS:  LIVER: Hypertrophied of the lateral segment of the left hepatic lobe   along with hepatorenal notching, compatible with cirrhotic morphology.  BILE DUCTS: Normal caliber.  GALLBLADDER: Cholecystectomy.  SPLEEN: Calcified autoinfarcted spleen  PANCREAS: Within normal limits.  ADRENALS: Within normal limits.  KIDNEYS/URETERS: Symmetric renal enhancement. No hydronephrosis. Left   renal cyst.    BLADDER: Within normal limits.  REPRODUCTIVE ORGANS: Prostate within normal limits.    BOWEL: No bowel obstruction. Appendix is normal.  PERITONEUM/RETROPERITONEUM: Within normal limits.  VESSELS: Atherosclerotic changes.  LYMPH NODES: Stable upper abdominal lymphadenopathy. For example   gastrohepatic nodes measure up to 2.8 x 1.9 cm. A peripancreatic node   measures 3.1 x 1.7 cm. Periportal nodes measure up to 2.6 x 1.9 cm.  ABDOMINAL WALL: Small fat-containing left inguinal hernia.  BONES: Healed right anterior fifth through seventh rib fractures. Stable   compression deformity of T12 and L2.    IMPRESSION:  No pulmonary embolus.    Scattered bilateral subsegmental opacities may represent atelectasis.    Stable upper abdominal lymphadenopathy.        --- End of Report ---   Patient is a 44y old  Male who presents with a chief complaint of sickle cell crisis (25 Oct 2024 10:05)    HPI:  Patient is a 43yo M with PMH significant for sickle cell anemia, cirrhosis, hemochromatosis, and gout who presented with a low hemoglobin count from outpatient, also reporting worsening for lower back and lower extremity pain similar to prior sickle cell crises. He reports ongoing pain. Denies chest pain, but admits to some exertional dyspnea which he attributes to his acute anemia. He was seen by his outpatient physician and found to be anemic, recommended for 2U PRBC transfusion.      ED/hospital course: afebrile, hypertensive, SpO2 96% on 2L NC. Monitored off antibiotics. Labs with anemia, elevated LFTS. Overnight blood cx with GPC, a dose of vancomycin was given. ID asked to evaluate.    He has a port that has been there for > 10 yrs received BT every 2 months. Never had issues with the line before    Bcx 10/25 4/4 Staph epidermidis.        prior hospital charts reviewed [  ]  primary team notes reviewed [x  ]  other consultant notes reviewed [  ]    PAST MEDICAL & SURGICAL HISTORY:  Sickle cell anemia      Gout      History of cholecystectomy          Allergies  ceftriaxone (Anaphylaxis)  piperacillin-tazobactam (Urticaria)  penicillin (Pruritus)  hydroxyurea (Other)    ANTIMICROBIALS (past 90 days)  MEDICATIONS  (STANDING):  vancomycin  IVPB   250 mL/Hr IV Intermittent (10-26-24 @ 03:57)          MEDICATIONS  (STANDING):  acetaminophen     Tablet .. 650 every 6 hours PRN  allopurinol 50 daily  diphenhydrAMINE 25 every 6 hours PRN  HYDROmorphone PCA (1 mG/mL) 30 PCA Continuous  melatonin 3 at bedtime PRN  ondansetron Injectable 4 every 6 hours PRN  pantoprazole    Tablet 40 before breakfast  polyethylene glycol 3350 17 daily  senna 2 at bedtime    SOCIAL HISTORY: Lives at home with family, no etoh, IVDU,    FAMILY HISTORY:  Family history of sickle cell trait (Father, Mother)    Patient's father is  (Father)    Patient's mother is  (Mother)      REVIEW OF SYSTEMS  [  ] ROS unobtainable because:    [ x ] All other systems negative except as noted below:	    Constitutional:  [ ] fever [ ] chills  [ ] weight loss  [x ] weakness  Skin:  [ ] rash [ ] phlebitis	  Eyes: [ ] icterus [ ] pain  [ ] discharge	  ENMT: [ ] sore throat  [ ] thrush [ ] ulcers [ ] exudates  Respiratory: [ ] dyspnea [ ] hemoptysis [ ] cough [ ] sputum	  Cardiovascular:  [ ] chest pain [ ] palpitations [ ] edema	  Gastrointestinal:  [ ] nausea [ ] vomiting [ ] diarrhea [ ] constipation [ ] pain	  Genitourinary:  [ ] dysuria [ ] frequency [ ] hematuria [ ] discharge [ ] flank pain  [ ] incontinence  Musculoskeletal:  [ ] myalgias [ ] arthralgias [ ] arthritis  [ ] back pain  Neurological:  [ ] headache [ ] seizures  [ ] confusion/altered mental status  Psychiatric:  [ ] anxiety [ ] depression	  Hematology/Lymphatics:  [ ] lymphadenopathy  Endocrine:  [ ] adrenal [ ] thyroid  Allergic/Immunologic:	 [ ] transplant [ ] seasonal    Vital Signs Last 24 Hrs  T(F): 97.5 (10-26-24 @ 10:30), Max: 98.6 (10-25-24 @ 06:19)  Vital Signs Last 24 Hrs  HR: 85 (10-26-24 @ 10:30) (79 - 97)  BP: 144/90 (10-26-24 @ 10:30) (137/70 - 155/90)  RR: 18 (10-26-24 @ 10:30)  SpO2: 95% (10-26-24 @ 10:30) (95% - 96%)  Wt(kg): --    PHYSICAL EXAM:    General: Patient in NAD  HEENT: NCAT, EOMI, PERRL, no oral lesions  CV: S1+S2, no m/r/g appreciated   Lungs: No respiratory distress, CTAB  Abd: Soft, nontender, no guarding, no rebound tenderness, + bowel sounds   : No suprapubic tenderness  Neuro: Alert and oriented to time, place and person. Moves all extremities against gravity.  Ext: No cyanosis, no edema  Skin: No rash, no phlebitis, port site clean                              6.6    10.71 )-----------( 256      ( 26 Oct 2024 06:57 )             18.2   10-25    136  |  106  |  28[H]  ----------------------------<  89  4.4   |  17[L]  |  1.24    Ca    8.6      25 Oct 2024 10:32    TPro  6.8  /  Alb  3.7  /  TBili  7.6[H]  /  DBili  x   /  AST  123[H]  /  ALT  46[H]  /  AlkPhos  134[H]  10-25    Urinalysis Basic - ( 25 Oct 2024 10:32 )    Color: x / Appearance: x / SG: x / pH: x  Gluc: 89 mg/dL / Ketone: x  / Bili: x / Urobili: x   Blood: x / Protein: x / Nitrite: x   Leuk Esterase: x / RBC: x / WBC x   Sq Epi: x / Non Sq Epi: x / Bacteria: x    MICROBIOLOGY:  Culture - Blood (collected 25 Oct 2024 01:23)  Source: .Blood BLOOD  Gram Stain (26 Oct 2024 04:46):    Growth in aerobic bottle: Gram Positive Cocci in Clusters    Growth in anaerobic bottle: Gram Positive Cocci in Clusters  Preliminary Report (26 Oct 2024 04:46):    Growth in aerobic bottle: Gram Positive Cocci in Clusters    Growth in anaerobic bottle: Gram Positive Cocci in Clusters    Culture - Blood (collected 25 Oct 2024 01:23)  Source: .Blood BLOOD  Gram Stain (26 Oct 2024 05:41):    Growth in aerobic bottle: Gram Positive Cocci in Clusters    Growth in anaerobic bottle: Gram Positive Cocci in Clusters  Preliminary Report (26 Oct 2024 05:42):    Growth in aerobic bottle: Gram Positive Cocci in Clusters    Growth in anaerobic bottle: Gram Positive Cocci in Clusters    Direct identification is available within approximately 3-5    hours either by Blood Panel Multiplexed PCR or Direct    MALDI-TOF. Details: [email or web address]  Organism: Blood Culture PCR (26 Oct 2024 04:16)  Organism: Blood Culture PCR (26 Oct 2024 04:16)      Method Type: PCR      -  Staphylococcus epidermidis: Detec                  RADIOLOGY:  imaging below personally reviewed and agree with findings    < from: CT Abdomen and Pelvis w/ IV Cont (10.25.24 @ 03:11) >    ACC: [id number] EXAM:  CT ABDOMEN AND PELVIS IC   ORDERED BY: RAYMOND DOLL     ACC: [id number] EXAM:  CT ANGIO CHEST PULM ART [facility]   ORDERED BY: RAYMOND DOLL     PROCEDURE DATE:  10/25/2024          INTERPRETATION:  CLINICAL INFORMATION:Sickle cell. Evaluate for PE   versus acute chest syndrome.    COMPARISON: CTA chest 2024, CT abdomen pelvis 10/18/2023    CONTRAST/COMPLICATIONS:  IV Contrast: Omnipaque 350 (accession [id number]), IV contrast documented   in unlinked concurrent exam (accession [id number])  90 cc administered   10   cc discarded  Oral Contrast: NONE  Complications: None reported at time of study completion    PROCEDURE:  CT Angiography of the Chest was performed followed by portal venous phase   imaging of the Abdomen and Pelvis.  Sagittal and coronal reformats were performed as well as 3D (MIP)   reconstructions.    FINDINGS:  CHEST:  LUNGS AND LARGE AIRWAYS: Patent central airways. Scattered bilateral   subsegmental atelectasis..  PLEURA: Trace bilateral pleural effusions  VESSELS:  No main, lobar, segmental pulmonary embolism.  HEART: Cardiomegaly. No pericardial effusion.  MEDIASTINUM AND MYRON: Stable mildly enlarged mediastinal lymph nodes. For   reference a right upper paratracheal node measures 1.8 x 1.7 cm. And   gastroesophageal node measures 2.0 x 1.4 cm..  CHEST WALL AND LOWER NECK: Right anterior Mediport catheter with tip in   the cava atrial junction.    ABDOMEN AND PELVIS:  LIVER: Hypertrophied of the lateral segment of the left hepatic lobe   along with hepatorenal notching, compatible with cirrhotic morphology.  BILE DUCTS: Normal caliber.  GALLBLADDER: Cholecystectomy.  SPLEEN: Calcified autoinfarcted spleen  PANCREAS: Within normal limits.  ADRENALS: Within normal limits.  KIDNEYS/URETERS: Symmetric renal enhancement. No hydronephrosis. Left   renal cyst.    BLADDER: Within normal limits.  REPRODUCTIVE ORGANS: Prostate within normal limits.    BOWEL: No bowel obstruction. Appendix is normal.  PERITONEUM/RETROPERITONEUM: Within normal limits.  VESSELS: Atherosclerotic changes.  LYMPH NODES: Stable upper abdominal lymphadenopathy. For example   gastrohepatic nodes measure up to 2.8 x 1.9 cm. A peripancreatic node   measures 3.1 x 1.7 cm. Periportal nodes measure up to 2.6 x 1.9 cm.  ABDOMINAL WALL: Small fat-containing left inguinal hernia.  BONES: Healed right anterior fifth through seventh rib fractures. Stable   compression deformity of T12 and L2.    IMPRESSION:  No pulmonary embolus.    Scattered bilateral subsegmental opacities may represent atelectasis.    Stable upper abdominal lymphadenopathy.        --- End of Report ---          < end of copied text >  < from: CT Angio Chest PE Protocol w/ IV Cont (10.25.24 @ 03:11) >    ACC: [id number] EXAM:  CT ABDOMEN AND PELVIS IC   ORDERED BY: RAYMOND DOLL     ACC: [id number] EXAM:  CT ANGIO CHEST PULM ART [facility]   ORDERED BY: RAYMOND DOLL     PROCEDURE DATE:  10/25/2024          INTERPRETATION:  CLINICAL INFORMATION:Sickle cell. Evaluate for PE   versus acute chest syndrome.    COMPARISON: CTA chest 2024, CT abdomen pelvis 10/18/2023    CONTRAST/COMPLICATIONS:  IV Contrast: Omnipaque 350 (accession [id number]), IV contrast documented   in unlinked concurrent exam (accession [id number])  90 cc administered   10   cc discarded  Oral Contrast: NONE  Complications: None reported at time of study completion    PROCEDURE:  CT Angiography of the Chest was performed followed by portal venous phase   imaging of the Abdomen and Pelvis.  Sagittal and coronal reformats were performed as well as 3D (MIP)   reconstructions.    FINDINGS:  CHEST:  LUNGS AND LARGE AIRWAYS: Patent central airways. Scattered bilateral   subsegmental atelectasis..  PLEURA: Trace bilateral pleural effusions  VESSELS:  No main, lobar, segmental pulmonary embolism.  HEART: Cardiomegaly. No pericardial effusion.  MEDIASTINUM AND MYRON: Stable mildly enlarged mediastinal lymph nodes. For   reference a right upper paratracheal node measures 1.8 x 1.7 cm. And   gastroesophageal node measures 2.0 x 1.4 cm..  CHEST WALL AND LOWER NECK: Right anterior Mediport catheter with tip in   the cava atrial junction.    ABDOMEN AND PELVIS:  LIVER: Hypertrophied of the lateral segment of the left hepatic lobe   along with hepatorenal notching, compatible with cirrhotic morphology.  BILE DUCTS: Normal caliber.  GALLBLADDER: Cholecystectomy.  SPLEEN: Calcified autoinfarcted spleen  PANCREAS: Within normal limits.  ADRENALS: Within normal limits.  KIDNEYS/URETERS: Symmetric renal enhancement. No hydronephrosis. Left   renal cyst.    BLADDER: Within normal limits.  REPRODUCTIVE ORGANS: Prostate within normal limits.    BOWEL: No bowel obstruction. Appendix is normal.  PERITONEUM/RETROPERITONEUM: Within normal limits.  VESSELS: Atherosclerotic changes.  LYMPH NODES: Stable upper abdominal lymphadenopathy. For example   gastrohepatic nodes measure up to 2.8 x 1.9 cm. A peripancreatic node   measures 3.1 x 1.7 cm. Periportal nodes measure up to 2.6 x 1.9 cm.  ABDOMINAL WALL: Small fat-containing left inguinal hernia.  BONES: Healed right anterior fifth through seventh rib fractures. Stable   compression deformity of T12 and L2.    IMPRESSION:  No pulmonary embolus.    Scattered bilateral subsegmental opacities may represent atelectasis.    Stable upper abdominal lymphadenopathy.    --- End of Report ---

## 2024-10-26 NOTE — CONSULT NOTE ADULT - ASSESSMENT
Patient is a 43yo M with PMH significant for sickle cell anemia, cirrhosis, hemochromatosis, and gout who presented with a low hemoglobin count from outpatient, also reporting worsening for lower back and lower extremity pain similar to prior sickle cell crises. He reports ongoing pain. Denies chest pain, but admits to some exertional dyspnea which he attributes to his acute anemia. He was seen by his outpatient physician and found to be anemic, recommended for 2U PRBC transfusion.      ED/hospital course: afebrile, hypertensive, SpO2 96% on 2L NC. Monitored off antibiotics. Labs with anemia, elevated LFTS. Overnight blood cx with GPC, a dose of vancomycin was given. ID asked to evaluate.    Bcx 10/25 4/4 Staph epidermidis.      # High grade Staph epidermidis bacteremia  # SCC    Please repeat blood cultures X2 from peripheral site  Will discuss antibiotics        All recommendations are tentative pending Attending Attestation.    Sp Rangel MD, PGY-5  ID Fellow  [employer] Teams Preferred  After 5pm/weekends call [phone]   Patient is a 43yo M with PMH significant for sickle cell anemia, cirrhosis, hemochromatosis, and gout who presented with a low hemoglobin count from outpatient, also reporting worsening for lower back and lower extremity pain similar to prior sickle cell crises. He reports ongoing pain. Denies chest pain, but admits to some exertional dyspnea which he attributes to his acute anemia. He was seen by his outpatient physician and found to be anemic, recommended for 2U PRBC transfusion.      ED/hospital course: afebrile, hypertensive, SpO2 96% on 2L NC. Monitored off antibiotics. Labs with anemia, elevated LFTS. Overnight blood cx with GPC, a dose of vancomycin was given. ID asked to evaluate.    Bcx 10/25 4/4 Staph epidermidis.      # High grade Staph epidermidis bacteremia  # SCC  # multiple abx allergies     blood cx were collected from the port which is likely colonized  Please repeat blood cultures X2 from peripheral site and port  Can continue Vancomycin 1250 mg Q12 after the blood cx are obtained  Obtain TTE        All recommendations are tentative pending Attending Attestation.    Sp Rangel MD, PGY-5  ID Fellow  [employer] Teams Preferred  After 5pm/weekends call [phone]   Patient is a 45yo M with PMH significant for sickle cell anemia, cirrhosis, hemochromatosis, and gout who presented with a low hemoglobin count from outpatient, also reporting worsening for lower back and lower extremity pain similar to prior sickle cell crises. He reports ongoing pain. Denies chest pain, but admits to some exertional dyspnea which he attributes to his acute anemia. He was seen by his outpatient physician and found to be anemic, recommended for 2U PRBC transfusion.      ED/hospital course: afebrile, hypertensive, SpO2 96% on 2L NC. Monitored off antibiotics. Labs with anemia, elevated LFTS. Overnight blood cx with GPC, a dose of vancomycin was given. ID asked to evaluate.    Bcx 10/25 4/4 Staph epidermidis.      # High grade Staph epidermidis bacteremia  # SCC  # multiple abx allergies     blood cx were collected from the port which is likely source  Please repeat blood cultures X2  Can continue Vancomycin 100 mg Q12 after the blood cx are obtained  Obtain TTE  Continue to trend CBC/ trend fever curve  plan to salvage port if bacteremia clears      Case d/w Attending and Primary team.     Sp Rangel MD, PGY-5  ID Fellow  [employer] Teams Preferred  After 5pm/weekends call [phone]

## 2024-10-26 NOTE — PROGRESS NOTE ADULT - PROBLEM SELECTOR PLAN 1
c/w benadryl PO PRN for pruritis  c/w folic acid  can continue home Oxbryta 300mg PO BID and Jadenu 360mg PO QD if meds are brought in from home  Hgb improved from low of 5's, however still lower than baseline ~7; lab with sense of hemolytic crisis. s/p 1u PRBC on 8/15, repeat CBC under 6, repeat PRBC today. (of note, patient has history of hemochromatosis due to transfusion)  trend CBC with diff, retic count, LDH, CMP daily  CT angio negative for PE  incentive spirometer  bowel regimen  - decreased IVF fluid overload. needs to be maintained on 30cc ivf for PCA.    #Bacteremia: Suspect this may have precipitated crisis and could be coming from port infection, though port is not erythematous or tender on exam.  - Started vancomycin  - ID consult today

## 2024-10-26 NOTE — PROGRESS NOTE ADULT - SUBJECTIVE AND OBJECTIVE BOX
Pt voided in toilet. Urine is dark red with clots. Post void bladder shows 247.        Patient is a 44y old  Male who presents with a chief complaint of sickle cell crisis (26 Oct 2024 12:18)      SUBJECTIVE / OVERNIGHT EVENTS: Pt generally feeling well this morning. States pain is controlled currently.     MEDICATIONS  (STANDING):  allopurinol 50 milliGRAM(s) Oral daily  folic acid 1 milliGRAM(s) Oral every 24 hours  HYDROmorphone PCA (1 mG/mL) 30 milliLiter(s) PCA Continuous PCA Continuous  pantoprazole    Tablet 40 milliGRAM(s) Oral before breakfast  polyethylene glycol 3350 17 Gram(s) Oral daily  senna 2 Tablet(s) Oral at bedtime  sodium chloride 0.45%. 1000 milliLiter(s) (100 mL/Hr) IV Continuous <Continuous>    MEDICATIONS  (PRN):  acetaminophen     Tablet .. 650 milliGRAM(s) Oral every 6 hours PRN Temp greater or equal to 38C (100.4F), Mild Pain (1 - 3)  diphenhydrAMINE 25 milliGRAM(s) Oral every 6 hours PRN Rash and/or Itching  melatonin 3 milliGRAM(s) Oral at bedtime PRN Insomnia  naloxone Injectable 0.1 milliGRAM(s) IV Push every 3 minutes PRN For ANY of the following changes in patient status:  A. RR LESS THAN 10 breaths per minute, B. Oxygen saturation LESS THAN 90%, C. Sedation score of 6  ondansetron Injectable 4 milliGRAM(s) IV Push every 6 hours PRN Nausea      CAPILLARY BLOOD GLUCOSE        I&O's Summary    25 Oct 2024 07:01  -  26 Oct 2024 07:00  --------------------------------------------------------  IN: 1000 mL / OUT: 0 mL / NET: 1000 mL        PHYSICAL EXAM:  Vital Signs Last 24 Hrs  T(C): 36.4 (26 Oct 2024 10:30), Max: 36.8 (26 Oct 2024 02:00)  T(F): 97.5 (26 Oct 2024 10:30), Max: 98.2 (26 Oct 2024 02:00)  HR: 85 (26 Oct 2024 10:30) (79 - 97)  BP: 144/90 (26 Oct 2024 10:30) (137/70 - 155/90)  BP(mean): --  RR: 18 (26 Oct 2024 10:30) (18 - 18)  SpO2: 95% (26 Oct 2024 10:30) (95% - 96%)    Parameters below as of 26 Oct 2024 10:30  Patient On (Oxygen Delivery Method): nasal cannula      CONSTITUTIONAL: NAD, well-developed, well-groomed  EYES: PERRLA; conjunctiva and sclera clear  ENMT: Moist oral mucosa, no pharyngeal injection or exudates; normal dentition  NECK: Supple, no palpable masses; no thyromegaly  RESPIRATORY: Normal respiratory effort; lungs are clear to auscultation bilaterally  CARDIOVASCULAR: Regular rate and rhythm, normal S1 and S2, no murmur/rub/gallop; + b/l lower extremity edema; Peripheral pulses are 2+ bilaterally  ABDOMEN: Nontender to palpation, normoactive bowel sounds, no rebound/guarding; No hepatosplenomegaly      LABS:                        6.6    10.71 )-----------( 256      ( 26 Oct 2024 06:57 )             18.2     10-25    136  |  106  |  28[H]  ----------------------------<  89  4.4   |  17[L]  |  1.24    Ca    8.6      25 Oct 2024 10:32    TPro  6.8  /  Alb  3.7  /  TBili  7.6[H]  /  DBili  x   /  AST  123[H]  /  ALT  46[H]  /  AlkPhos  134[H]  10-25    PT/INR - ( 25 Oct 2024 12:50 )   PT: 13.3 sec;   INR: 1.15 ratio         PTT - ( 25 Oct 2024 12:50 )  PTT:28.5 sec      Urinalysis Basic - ( 25 Oct 2024 10:32 )    Color: x / Appearance: x / SG: x / pH: x  Gluc: 89 mg/dL / Ketone: x  / Bili: x / Urobili: x   Blood: x / Protein: x / Nitrite: x   Leuk Esterase: x / RBC: x / WBC x   Sq Epi: x / Non Sq Epi: x / Bacteria: x        Culture - Blood (collected 25 Oct 2024 01:23)  Source: .Blood BLOOD  Gram Stain (26 Oct 2024 04:46):    Growth in aerobic bottle: Gram Positive Cocci in Clusters    Growth in anaerobic bottle: Gram Positive Cocci in Clusters  Preliminary Report (26 Oct 2024 04:46):    Growth in aerobic bottle: Gram Positive Cocci in Clusters    Growth in anaerobic bottle: Gram Positive Cocci in Clusters    Culture - Blood (collected 25 Oct 2024 01:23)  Source: .Blood BLOOD  Gram Stain (26 Oct 2024 05:41):    Growth in aerobic bottle: Gram Positive Cocci in Clusters    Growth in anaerobic bottle: Gram Positive Cocci in Clusters  Preliminary Report (26 Oct 2024 05:42):    Growth in aerobic bottle: Gram Positive Cocci in Clusters    Growth in anaerobic bottle: Gram Positive Cocci in Clusters    Direct identification is available within approximately 3-5    hours either by Blood Panel Multiplexed PCR or Direct    MALDI-TOF. Details: [email or web address]  Organism: Blood Culture PCR (26 Oct 2024 04:16)  Organism: Blood Culture PCR (26 Oct 2024 04:16)        RADIOLOGY & ADDITIONAL TESTS:  Results Reviewed:   Imaging Personally Reviewed:  Electrocardiogram Personally Reviewed:    COORDINATION OF CARE:  Care Discussed with Consultants/Other Providers [Y/N]:  Prior or Outpatient Records Reviewed [Y/N]:

## 2024-10-26 NOTE — PROGRESS NOTE ADULT - ASSESSMENT
44 year old M with SCD admitted for sickle cell crisis. now with signs of fluid overload and finding c/f cirrhosis. Improvement with diuretics.

## 2024-10-26 NOTE — PROVIDER CONTACT NOTE (CRITICAL VALUE NOTIFICATION) - ACTION/TREATMENT ORDERED:
vitals will be assessed. no further action at this time vitals will be assessed. vanco will be given at this time

## 2024-10-26 NOTE — PROGRESS NOTE ADULT - PROBLEM SELECTOR PLAN 2
- continue PCA Dilaudid  - Dilaudi 0.5mg demand with 6min lock out. 4hr max 20mg.   - d/c toradol as it cause nausea and vomiting.   - bowel regimen.

## 2024-10-26 NOTE — CONSULT NOTE ADULT - ATTENDING COMMENTS
Overall, CLABSI, CoNS  - Vanco 1g q 12 (monitor levels)  - Will attempt to salvage catheter--if any signs of systemic infection, or inability to clear, or signs treatment failure, would have to be removed acutely  - Repeat BCXs q 48 until clear  - Monitor for alternate sources infection      Data/Rationale:  43 yo M with PMH significant for sickle cell anemia, cirrhosis, hemochromatosis, and gout who presented with a low hemoglobin count from outpatient, also reporting worsening for lower back and lower extremity pain similar to prior sickle cell crises  No fever, has leukocytosis  Medport in place ~10 years, no exterior signs infection  BCXs 4/4 CoNS  CT no PE, bilateral opacities, atelectasis, LAD  No other cardiac hardware  Never had infection of catheter prior  Would presume this is true CLABSI, but would attempt to salvage line    Brandon Esparza MD  Contact on TEAMS messaging from 9am - 5pm  From 5pm-9am, on weekends, or if no response call [phone]

## 2024-10-27 LAB
-  CLINDAMYCIN: SIGNIFICANT CHANGE UP
-  ERYTHROMYCIN: SIGNIFICANT CHANGE UP
-  GENTAMICIN: SIGNIFICANT CHANGE UP
-  OXACILLIN: SIGNIFICANT CHANGE UP
-  RIFAMPIN: SIGNIFICANT CHANGE UP
-  TETRACYCLINE: SIGNIFICANT CHANGE UP
-  TRIMETHOPRIM/SULFAMETHOXAZOLE: SIGNIFICANT CHANGE UP
-  VANCOMYCIN: SIGNIFICANT CHANGE UP
ALBUMIN SERPL ELPH-MCNC: 3.7 G/DL — SIGNIFICANT CHANGE UP (ref 3.3–5)
ALP SERPL-CCNC: 144 U/L — HIGH (ref 40–120)
ALT FLD-CCNC: 40 U/L — SIGNIFICANT CHANGE UP (ref 4–41)
ANION GAP SERPL CALC-SCNC: 12 MMOL/L — SIGNIFICANT CHANGE UP (ref 7–14)
AST SERPL-CCNC: 104 U/L — HIGH (ref 4–40)
BASOPHILS # BLD AUTO: 0.08 K/UL — SIGNIFICANT CHANGE UP (ref 0–0.2)
BASOPHILS NFR BLD AUTO: 0.9 % — SIGNIFICANT CHANGE UP (ref 0–2)
BILIRUB SERPL-MCNC: 5.7 MG/DL — HIGH (ref 0.2–1.2)
BUN SERPL-MCNC: 23 MG/DL — SIGNIFICANT CHANGE UP (ref 7–23)
CALCIUM SERPL-MCNC: 8.5 MG/DL — SIGNIFICANT CHANGE UP (ref 8.4–10.5)
CHLORIDE SERPL-SCNC: 103 MMOL/L — SIGNIFICANT CHANGE UP (ref 98–107)
CO2 SERPL-SCNC: 18 MMOL/L — LOW (ref 22–31)
CREAT SERPL-MCNC: 0.83 MG/DL — SIGNIFICANT CHANGE UP (ref 0.5–1.3)
CULTURE RESULTS: ABNORMAL
EGFR: 110 ML/MIN/1.73M2 — SIGNIFICANT CHANGE UP
EOSINOPHIL # BLD AUTO: 1.26 K/UL — HIGH (ref 0–0.5)
EOSINOPHIL NFR BLD AUTO: 13.8 % — HIGH (ref 0–6)
FERRITIN SERPL-MCNC: 3462 NG/ML — HIGH (ref 30–400)
GLUCOSE SERPL-MCNC: 102 MG/DL — HIGH (ref 70–99)
HCT VFR BLD CALC: 18.6 % — CRITICAL LOW (ref 39–50)
HERPES SIMPLEX VIRUS 1/2 SURVEILLANCE PCR SOURCE: SIGNIFICANT CHANGE UP
HGB BLD-MCNC: 6.5 G/DL — CRITICAL LOW (ref 13–17)
HSV1+2 DNA SPEC QL NAA+PROBE: SIGNIFICANT CHANGE UP
IANC: 4.16 K/UL — SIGNIFICANT CHANGE UP (ref 1.8–7.4)
IMM GRANULOCYTES NFR BLD AUTO: 0.5 % — SIGNIFICANT CHANGE UP (ref 0–0.9)
LDH SERPL L TO P-CCNC: 1287 U/L — HIGH (ref 135–225)
LYMPHOCYTES # BLD AUTO: 2.64 K/UL — SIGNIFICANT CHANGE UP (ref 1–3.3)
LYMPHOCYTES # BLD AUTO: 28.9 % — SIGNIFICANT CHANGE UP (ref 13–44)
MCHC RBC-ENTMCNC: 30.8 PG — SIGNIFICANT CHANGE UP (ref 27–34)
MCHC RBC-ENTMCNC: 34.9 GM/DL — SIGNIFICANT CHANGE UP (ref 32–36)
MCV RBC AUTO: 88.2 FL — SIGNIFICANT CHANGE UP (ref 80–100)
METHOD TYPE: SIGNIFICANT CHANGE UP
MONOCYTES # BLD AUTO: 0.96 K/UL — HIGH (ref 0–0.9)
MONOCYTES NFR BLD AUTO: 10.5 % — SIGNIFICANT CHANGE UP (ref 2–14)
NEUTROPHILS # BLD AUTO: 4.16 K/UL — SIGNIFICANT CHANGE UP (ref 1.8–7.4)
NEUTROPHILS NFR BLD AUTO: 45.4 % — SIGNIFICANT CHANGE UP (ref 43–77)
NRBC # BLD: 5 /100 WBCS — HIGH (ref 0–0)
NRBC # FLD: 0.42 K/UL — HIGH (ref 0–0)
ORGANISM # SPEC MICROSCOPIC CNT: ABNORMAL
PLATELET # BLD AUTO: 209 K/UL — SIGNIFICANT CHANGE UP (ref 150–400)
POTASSIUM SERPL-MCNC: 4.6 MMOL/L — SIGNIFICANT CHANGE UP (ref 3.5–5.3)
POTASSIUM SERPL-SCNC: 4.6 MMOL/L — SIGNIFICANT CHANGE UP (ref 3.5–5.3)
PROT SERPL-MCNC: 7 G/DL — SIGNIFICANT CHANGE UP (ref 6–8.3)
RBC # BLD: 2.11 M/UL — LOW (ref 4.2–5.8)
RBC # BLD: 2.11 M/UL — LOW (ref 4.2–5.8)
RBC # FLD: 26.1 % — HIGH (ref 10.3–14.5)
RETICS #: 311.9 K/UL — HIGH (ref 25–125)
RETICS/RBC NFR: 14.8 % — HIGH (ref 0.5–2.5)
SODIUM SERPL-SCNC: 133 MMOL/L — LOW (ref 135–145)
SPECIMEN SOURCE: SIGNIFICANT CHANGE UP
VANCOMYCIN TROUGH SERPL-MCNC: 14.3 UG/ML — SIGNIFICANT CHANGE UP (ref 10–20)
WBC # BLD: 9.15 K/UL — SIGNIFICANT CHANGE UP (ref 3.8–10.5)
WBC # FLD AUTO: 9.15 K/UL — SIGNIFICANT CHANGE UP (ref 3.8–10.5)

## 2024-10-27 PROCEDURE — 99233 SBSQ HOSP IP/OBS HIGH 50: CPT

## 2024-10-27 PROCEDURE — 99232 SBSQ HOSP IP/OBS MODERATE 35: CPT

## 2024-10-27 RX ADMIN — VANCOMYCIN HYDROCHLORIDE 250 MILLIGRAM(S): KIT at 05:26

## 2024-10-27 RX ADMIN — Medication 100 MILLILITER(S): at 21:41

## 2024-10-27 RX ADMIN — VANCOMYCIN HYDROCHLORIDE 250 MILLIGRAM(S): KIT at 18:03

## 2024-10-27 RX ADMIN — Medication 2 TABLET(S): at 21:42

## 2024-10-27 RX ADMIN — Medication 30 MILLILITER(S): at 18:30

## 2024-10-27 RX ADMIN — FOLIC ACID 1 MILLIGRAM(S): 1 TABLET ORAL at 10:05

## 2024-10-27 RX ADMIN — Medication 50 MILLIGRAM(S): at 10:04

## 2024-10-27 RX ADMIN — Medication 30 MILLILITER(S): at 07:20

## 2024-10-27 RX ADMIN — Medication 30 MILLILITER(S): at 19:14

## 2024-10-27 RX ADMIN — PANTOPRAZOLE SODIUM 40 MILLIGRAM(S): 40 TABLET, DELAYED RELEASE ORAL at 05:26

## 2024-10-27 RX ADMIN — ONDANSETRON HYDROCHLORIDE 4 MILLIGRAM(S): 2 INJECTION, SOLUTION INTRAMUSCULAR; INTRAVENOUS at 21:41

## 2024-10-27 NOTE — PROGRESS NOTE ADULT - SUBJECTIVE AND OBJECTIVE BOX
Patient is a 44y old  Male who presents with a chief complaint of sickle cell crisis (26 Oct 2024 12:54)      SUBJECTIVE / OVERNIGHT EVENTS: Pt without bothersome symptoms overnight. Feeling well this morning without complaint.     MEDICATIONS  (STANDING):  allopurinol 50 milliGRAM(s) Oral daily  folic acid 1 milliGRAM(s) Oral every 24 hours  HYDROmorphone PCA (1 mG/mL) 30 milliLiter(s) PCA Continuous PCA Continuous  pantoprazole    Tablet 40 milliGRAM(s) Oral before breakfast  polyethylene glycol 3350 17 Gram(s) Oral daily  senna 2 Tablet(s) Oral at bedtime  sodium chloride 0.45%. 1000 milliLiter(s) (100 mL/Hr) IV Continuous <Continuous>  vancomycin  IVPB 1000 milliGRAM(s) IV Intermittent every 12 hours    MEDICATIONS  (PRN):  acetaminophen     Tablet .. 650 milliGRAM(s) Oral every 6 hours PRN Temp greater or equal to 38C (100.4F), Mild Pain (1 - 3)  diphenhydrAMINE 25 milliGRAM(s) Oral every 6 hours PRN Rash and/or Itching  melatonin 3 milliGRAM(s) Oral at bedtime PRN Insomnia  naloxone Injectable 0.1 milliGRAM(s) IV Push every 3 minutes PRN For ANY of the following changes in patient status:  A. RR LESS THAN 10 breaths per minute, B. Oxygen saturation LESS THAN 90%, C. Sedation score of 6  ondansetron Injectable 4 milliGRAM(s) IV Push every 6 hours PRN Nausea      CAPILLARY BLOOD GLUCOSE        I&O's Summary      PHYSICAL EXAM:  Vital Signs Last 24 Hrs  T(C): 36.7 (27 Oct 2024 10:00), Max: 36.8 (26 Oct 2024 18:06)  T(F): 98.1 (27 Oct 2024 10:00), Max: 98.2 (26 Oct 2024 18:06)  HR: 100 (27 Oct 2024 10:00) (77 - 100)  BP: 151/90 (27 Oct 2024 10:00) (146/83 - 154/84)  BP(mean): --  RR: 18 (27 Oct 2024 10:00) (18 - 18)  SpO2: 92% (27 Oct 2024 10:00) (92% - 100%)    Parameters below as of 27 Oct 2024 10:00  Patient On (Oxygen Delivery Method): nasal cannula  O2 Flow (L/min): 2    CONSTITUTIONAL: NAD, well-developed, well-groomed  EYES: PERRLA; conjunctiva and sclera clear  ENMT: Moist oral mucosa, no pharyngeal injection or exudates; normal dentition  NECK: Supple, no palpable masses; no thyromegaly  RESPIRATORY: Normal respiratory effort; lungs are clear to auscultation bilaterally  CARDIOVASCULAR: Regular rate and rhythm, normal S1 and S2, no murmur/rub/gallop; No lower extremity edema; Peripheral pulses are 2+ bilaterally  ABDOMEN: Nontender to palpation, normoactive bowel sounds, no rebound/guarding; No hepatosplenomegaly    LABS:                        6.5    9.15  )-----------( 209      ( 27 Oct 2024 06:38 )             18.6     10-27    133[L]  |  103  |  23  ----------------------------<  102[H]  4.6   |  18[L]  |  0.83    Ca    8.5      27 Oct 2024 06:38    TPro  7.0  /  Alb  3.7  /  TBili  5.7[H]  /  DBili  x   /  AST  104[H]  /  ALT  40  /  AlkPhos  144[H]  10-27          Urinalysis Basic - ( 27 Oct 2024 06:38 )    Color: x / Appearance: x / SG: x / pH: x  Gluc: 102 mg/dL / Ketone: x  / Bili: x / Urobili: x   Blood: x / Protein: x / Nitrite: x   Leuk Esterase: x / RBC: x / WBC x   Sq Epi: x / Non Sq Epi: x / Bacteria: x        Culture - Blood (collected 25 Oct 2024 01:23)  Source: .Blood BLOOD  Gram Stain (26 Oct 2024 04:46):    Growth in aerobic bottle: Gram Positive Cocci in Clusters    Growth in anaerobic bottle: Gram Positive Cocci in Clusters  Final Report (26 Oct 2024 19:12):    Growth in aerobic and anaerobic bottles: Staphylococcus epidermidis    "Susceptibilities not performed"    Culture - Blood (collected 25 Oct 2024 01:23)  Source: .Blood BLOOD  Gram Stain (26 Oct 2024 05:41):    Growth in aerobic bottle: Gram Positive Cocci in Clusters    Growth in anaerobic bottle: Gram Positive Cocci in Clusters  Preliminary Report (26 Oct 2024 21:54):    Growth in aerobic and anaerobic bottles: Staphylococcus epidermidis    Susceptibility to follow.    Direct identification is available within approximately 3-5    hours either by Blood Panel Multiplexed PCR or Direct    MALDI-TOF. Details: [email or web address]  Organism: Blood Culture PCR (26 Oct 2024 04:16)  Organism: Blood Culture PCR (26 Oct 2024 04:16)        RADIOLOGY & ADDITIONAL TESTS:  Results Reviewed:   Imaging Personally Reviewed:  Electrocardiogram Personally Reviewed:    COORDINATION OF CARE:  Care Discussed with Consultants/Other Providers [Y/N]:  Prior or Outpatient Records Reviewed [Y/N]:

## 2024-10-27 NOTE — PROGRESS NOTE ADULT - ASSESSMENT
Overall, CLABSI, CoNS  - Vanco 1g q 12 (monitor levels)  - Will attempt to salvage catheter--if any signs of systemic infection, or inability to clear, or signs treatment failure, would have to be removed acutely  - Repeat BCXs q 48 until clear  - Monitor for alternate sources infection      Data/Rationale:  43 yo M with PMH significant for sickle cell anemia, cirrhosis, hemochromatosis, and gout who presented with a low hemoglobin count from outpatient, also reporting worsening for lower back and lower extremity pain similar to prior sickle cell crises  No fever, has leukocytosis  Medport in place ~10 years, no exterior signs infection  BCXs 4/4 CoNS  CT no PE, bilateral opacities, atelectasis, LAD  No other cardiac hardware  Never had infection of catheter prior  Would presume this is true CLABSI, but would attempt to salvage line    Brandon Esparza MD  Contact on TEAMS messaging from 9am - 5pm  From 5pm-9am, on weekends, or if no response call [phone]

## 2024-10-27 NOTE — PROGRESS NOTE ADULT - SUBJECTIVE AND OBJECTIVE BOX
CC: F/U for Bacteremia    Saw/spoke to patient. No fevers, no chills. No new complaints.    Allergies  ceftriaxone (Anaphylaxis)  piperacillin-tazobactam (Urticaria)  penicillin (Pruritus)  hydroxyurea (Other)    ANTIMICROBIALS:  vancomycin  IVPB 1000 every 12 hours    PE:    Vital Signs Last 24 Hrs  T(C): 36.7 (27 Oct 2024 10:00), Max: 36.8 (26 Oct 2024 18:06)  T(F): 98.1 (27 Oct 2024 10:00), Max: 98.2 (26 Oct 2024 18:06)  HR: 100 (27 Oct 2024 10:00) (77 - 100)  BP: 151/90 (27 Oct 2024 10:00) (146/83 - 154/84)  RR: 18 (27 Oct 2024 10:00) (18 - 18)  SpO2: 92% (27 Oct 2024 10:00) (92% - 100%)    Gen: AOx3, NAD, non-toxic  CV: Nontachycardic  Resp: Breathing comfortably, RA  Abd: Soft, nontender  IV/Skin: No thrombophlebitis    LABS:                        6.5    9.15  )-----------( 209      ( 27 Oct 2024 06:38 )             18.6     10-27    133[L]  |  103  |  23  ----------------------------<  102[H]  4.6   |  18[L]  |  0.83    Ca    8.5      27 Oct 2024 06:38    TPro  7.0  /  Alb  3.7  /  TBili  5.7[H]  /  DBili  x   /  AST  104[H]  /  ALT  40  /  AlkPhos  144[H]  10-27    Urinalysis Basic - ( 27 Oct 2024 06:38 )    Color: x / Appearance: x / SG: x / pH: x  Gluc: 102 mg/dL / Ketone: x  / Bili: x / Urobili: x   Blood: x / Protein: x / Nitrite: x   Leuk Esterase: x / RBC: x / WBC x   Sq Epi: x / Non Sq Epi: x / Bacteria: x    MICROBIOLOGY:    .Blood BLOOD  10-25-24   Growth in aerobic and anaerobic bottles: Staphylococcus epidermidis  Susceptibility to follow.  Direct identification is available within approximately 3-5  hours either by Blood Panel Multiplexed PCR or Direct  MALDI-TOF. Details: [email or web address]  --  Blood Culture PCR    RADIOLOGY:    10/25 CT    IMPRESSION:  No pulmonary embolus.    Scattered bilateral subsegmental opacities may represent atelectasis.    Stable upper abdominal lymphadenopathy.

## 2024-10-28 DIAGNOSIS — R78.81 BACTEREMIA: ICD-10-CM

## 2024-10-28 LAB
ALBUMIN SERPL ELPH-MCNC: 3.6 G/DL — SIGNIFICANT CHANGE UP (ref 3.3–5)
ALP SERPL-CCNC: 142 U/L — HIGH (ref 40–120)
ALT FLD-CCNC: 34 U/L — SIGNIFICANT CHANGE UP (ref 4–41)
ANION GAP SERPL CALC-SCNC: 9 MMOL/L — SIGNIFICANT CHANGE UP (ref 7–14)
AST SERPL-CCNC: 97 U/L — HIGH (ref 4–40)
BASOPHILS # BLD AUTO: 0.07 K/UL — SIGNIFICANT CHANGE UP (ref 0–0.2)
BASOPHILS NFR BLD AUTO: 0.8 % — SIGNIFICANT CHANGE UP (ref 0–2)
BILIRUB SERPL-MCNC: 5.1 MG/DL — HIGH (ref 0.2–1.2)
BUN SERPL-MCNC: 19 MG/DL — SIGNIFICANT CHANGE UP (ref 7–23)
CALCIUM SERPL-MCNC: 8.4 MG/DL — SIGNIFICANT CHANGE UP (ref 8.4–10.5)
CHLORIDE SERPL-SCNC: 104 MMOL/L — SIGNIFICANT CHANGE UP (ref 98–107)
CMV DNA CSF QL NAA+PROBE: SIGNIFICANT CHANGE UP IU/ML
CMV DNA SPEC NAA+PROBE-LOG#: SIGNIFICANT CHANGE UP LOG10IU/ML
CO2 SERPL-SCNC: 20 MMOL/L — LOW (ref 22–31)
CREAT SERPL-MCNC: 0.98 MG/DL — SIGNIFICANT CHANGE UP (ref 0.5–1.3)
EBV DNA SERPL NAA+PROBE-ACNC: SIGNIFICANT CHANGE UP IU/ML
EBVPCR LOG: SIGNIFICANT CHANGE UP LOG10IU/ML
EGFR: 97 ML/MIN/1.73M2 — SIGNIFICANT CHANGE UP
EOSINOPHIL # BLD AUTO: 0.87 K/UL — HIGH (ref 0–0.5)
EOSINOPHIL NFR BLD AUTO: 10.4 % — HIGH (ref 0–6)
FERRITIN SERPL-MCNC: 3189 NG/ML — HIGH (ref 30–400)
GLUCOSE SERPL-MCNC: 103 MG/DL — HIGH (ref 70–99)
HCT VFR BLD CALC: 15.9 % — CRITICAL LOW (ref 39–50)
HGB BLD-MCNC: 5.8 G/DL — CRITICAL LOW (ref 13–17)
IANC: 4.7 K/UL — SIGNIFICANT CHANGE UP (ref 1.8–7.4)
IMM GRANULOCYTES NFR BLD AUTO: 0.6 % — SIGNIFICANT CHANGE UP (ref 0–0.9)
LDH SERPL L TO P-CCNC: 1191 U/L — HIGH (ref 135–225)
LYMPHOCYTES # BLD AUTO: 1.83 K/UL — SIGNIFICANT CHANGE UP (ref 1–3.3)
LYMPHOCYTES # BLD AUTO: 22 % — SIGNIFICANT CHANGE UP (ref 13–44)
MCHC RBC-ENTMCNC: 31.5 PG — SIGNIFICANT CHANGE UP (ref 27–34)
MCHC RBC-ENTMCNC: 36.5 GM/DL — HIGH (ref 32–36)
MCV RBC AUTO: 86.4 FL — SIGNIFICANT CHANGE UP (ref 80–100)
MONOCYTES # BLD AUTO: 0.81 K/UL — SIGNIFICANT CHANGE UP (ref 0–0.9)
MONOCYTES NFR BLD AUTO: 9.7 % — SIGNIFICANT CHANGE UP (ref 2–14)
NEUTROPHILS # BLD AUTO: 4.7 K/UL — SIGNIFICANT CHANGE UP (ref 1.8–7.4)
NEUTROPHILS NFR BLD AUTO: 56.5 % — SIGNIFICANT CHANGE UP (ref 43–77)
NRBC # BLD: 3 /100 WBCS — HIGH (ref 0–0)
NRBC # FLD: 0.24 K/UL — HIGH (ref 0–0)
PLATELET # BLD AUTO: 204 K/UL — SIGNIFICANT CHANGE UP (ref 150–400)
POTASSIUM SERPL-MCNC: 4.9 MMOL/L — SIGNIFICANT CHANGE UP (ref 3.5–5.3)
POTASSIUM SERPL-SCNC: 4.9 MMOL/L — SIGNIFICANT CHANGE UP (ref 3.5–5.3)
PROT SERPL-MCNC: 6.5 G/DL — SIGNIFICANT CHANGE UP (ref 6–8.3)
RBC # BLD: 1.84 M/UL — LOW (ref 4.2–5.8)
RBC # BLD: 1.84 M/UL — LOW (ref 4.2–5.8)
RBC # FLD: 24.6 % — HIGH (ref 10.3–14.5)
RETICS #: 174.4 K/UL — HIGH (ref 25–125)
RETICS/RBC NFR: 9.5 % — HIGH (ref 0.5–2.5)
SODIUM SERPL-SCNC: 133 MMOL/L — LOW (ref 135–145)
VANCOMYCIN TROUGH SERPL-MCNC: 35.7 UG/ML — CRITICAL HIGH (ref 10–20)
WBC # BLD: 8.33 K/UL — SIGNIFICANT CHANGE UP (ref 3.8–10.5)
WBC # FLD AUTO: 8.33 K/UL — SIGNIFICANT CHANGE UP (ref 3.8–10.5)

## 2024-10-28 PROCEDURE — 99231 SBSQ HOSP IP/OBS SF/LOW 25: CPT | Mod: GC

## 2024-10-28 PROCEDURE — 99233 SBSQ HOSP IP/OBS HIGH 50: CPT

## 2024-10-28 PROCEDURE — 99232 SBSQ HOSP IP/OBS MODERATE 35: CPT

## 2024-10-28 RX ADMIN — VANCOMYCIN HYDROCHLORIDE 250 MILLIGRAM(S): KIT at 05:18

## 2024-10-28 RX ADMIN — Medication 100 MILLILITER(S): at 19:10

## 2024-10-28 RX ADMIN — VANCOMYCIN HYDROCHLORIDE 250 MILLIGRAM(S): KIT at 17:02

## 2024-10-28 RX ADMIN — Medication 650 MILLIGRAM(S): at 01:02

## 2024-10-28 RX ADMIN — Medication 650 MILLIGRAM(S): at 13:22

## 2024-10-28 RX ADMIN — Medication 2 TABLET(S): at 21:37

## 2024-10-28 RX ADMIN — Medication 30 MILLILITER(S): at 19:07

## 2024-10-28 RX ADMIN — Medication 650 MILLIGRAM(S): at 14:20

## 2024-10-28 RX ADMIN — ONDANSETRON HYDROCHLORIDE 4 MILLIGRAM(S): 2 INJECTION, SOLUTION INTRAMUSCULAR; INTRAVENOUS at 13:21

## 2024-10-28 RX ADMIN — PANTOPRAZOLE SODIUM 40 MILLIGRAM(S): 40 TABLET, DELAYED RELEASE ORAL at 05:18

## 2024-10-28 RX ADMIN — Medication 30 MILLILITER(S): at 19:42

## 2024-10-28 RX ADMIN — FOLIC ACID 1 MILLIGRAM(S): 1 TABLET ORAL at 10:52

## 2024-10-28 RX ADMIN — Medication 650 MILLIGRAM(S): at 02:02

## 2024-10-28 RX ADMIN — Medication 30 MILLILITER(S): at 07:15

## 2024-10-28 RX ADMIN — Medication 50 MILLIGRAM(S): at 10:51

## 2024-10-28 RX ADMIN — Medication 100 MILLILITER(S): at 07:16

## 2024-10-28 NOTE — PROGRESS NOTE ADULT - ASSESSMENT
Patient is a 45yo M with PMH significant for sickle cell anemia, cirrhosis, hemochromatosis, and gout who presented with a low hemoglobin count, admitted with sickle cell crisis.

## 2024-10-28 NOTE — PROGRESS NOTE ADULT - SUBJECTIVE AND OBJECTIVE BOX
CC: F/U for Bacteremia    Saw/spoke to patient. No fevers, no chills. No new complaints.    Allergies  ceftriaxone (Anaphylaxis)  piperacillin-tazobactam (Urticaria)  penicillin (Pruritus)  hydroxyurea (Other)    ANTIMICROBIALS:  vancomycin  IVPB 1000 every 12 hours    PE:    Vital Signs Last 24 Hrs  T(C): 36.8 (28 Oct 2024 13:19), Max: 36.8 (28 Oct 2024 13:19)  T(F): 98.2 (28 Oct 2024 13:19), Max: 98.2 (28 Oct 2024 13:19)  HR: 81 (28 Oct 2024 13:19) (73 - 84)  BP: 161/90 (28 Oct 2024 13:19) (140/87 - 161/90)  RR: 18 (28 Oct 2024 13:19) (18 - 18)  SpO2: 95% (28 Oct 2024 13:19) (95% - 100%)    Gen: AOx3, NAD, non-toxic  CV: Nontachycardic  Resp: Breathing comfortably, RA  Abd: Soft, nontender  IV/Skin: No thrombophlebitis    LABS:                        5.8    8.33  )-----------( 204      ( 28 Oct 2024 06:55 )             15.9     10-28    133[L]  |  104  |  19  ----------------------------<  103[H]  4.9   |  20[L]  |  0.98    Ca    8.4      28 Oct 2024 06:55    TPro  6.5  /  Alb  3.6  /  TBili  5.1[H]  /  DBili  x   /  AST  97[H]  /  ALT  34  /  AlkPhos  142[H]  10-28    Urinalysis Basic - ( 28 Oct 2024 06:55 )    Color: x / Appearance: x / SG: x / pH: x  Gluc: 103 mg/dL / Ketone: x  / Bili: x / Urobili: x   Blood: x / Protein: x / Nitrite: x   Leuk Esterase: x / RBC: x / WBC x   Sq Epi: x / Non Sq Epi: x / Bacteria: x    MICROBIOLOGY:  Vancomycin Level, Trough: 35.7 ug/mL (10-28-24 @ 06:55)  Vancomycin Level, Trough: 14.3 ug/mL (10-27-24 @ 16:28)    .Blood BLOOD  10-25-24   Growth in aerobic and anaerobic bottles: Staphylococcus epidermidis  Direct identification is available within approximately 3-5  hours either by Blood Panel Multiplexed PCR or Direct  MALDI-TOF. Details: [email or web address]  --  Blood Culture PCR  Staphylococcus epidermidis    CMVPCR Log: NotDetec [id number]/mL (10-26 @ 06:57)    RADIOLOGY:    10/25    IMPRESSION:  No pulmonary embolus.    Scattered bilateral subsegmental opacities may represent atelectasis.    Stable upper abdominal lymphadenopathy.

## 2024-10-28 NOTE — PROGRESS NOTE ADULT - PROBLEM SELECTOR PLAN 5
- in setting of acute sickle cell crisis  - c/w hypotonic IVF  - suspect pre-renal MATA – check urine lytes to confirm  - trend BMP  - dose meds renally  - avoid NSAIDs, other nephrotoxic agents    Some edema noted on b/l LE and elevated proBNP, will still offer hypotonic IVF but will need to consider hepatorenal etiology.

## 2024-10-28 NOTE — PROGRESS NOTE ADULT - SUBJECTIVE AND OBJECTIVE BOX
Interval Events:   no acute events  patient found to have staph epi bacteremia, on vancomycin   pain is intermittently controlled with regimen  patient is eating well, out of bed  No abdominal pain, N/V  T bili down to 5.1. AST/ALT down-trending. Acute viral hepatitis panel negative  MELD 17    Hospital Medications:  acetaminophen     Tablet .. 650 milliGRAM(s) Oral every 6 hours PRN  allopurinol 50 milliGRAM(s) Oral daily  diphenhydrAMINE 25 milliGRAM(s) Oral every 6 hours PRN  folic acid 1 milliGRAM(s) Oral every 24 hours  HYDROmorphone PCA (1 mG/mL) 30 milliLiter(s) PCA Continuous PCA Continuous  melatonin 3 milliGRAM(s) Oral at bedtime PRN  naloxone Injectable 0.1 milliGRAM(s) IV Push every 3 minutes PRN  ondansetron Injectable 4 milliGRAM(s) IV Push every 6 hours PRN  pantoprazole    Tablet 40 milliGRAM(s) Oral before breakfast  polyethylene glycol 3350 17 Gram(s) Oral daily  senna 2 Tablet(s) Oral at bedtime  sodium chloride 0.45%. 1000 milliLiter(s) IV Continuous <Continuous>  vancomycin  IVPB 1000 milliGRAM(s) IV Intermittent every 12 hours      ROS: See above.    PHYSICAL EXAM:   Vital Signs:  Vital Signs Last 24 Hrs  T(C): 36.5 (28 Oct 2024 09:37), Max: 36.7 (27 Oct 2024 10:00)  T(F): 97.7 (28 Oct 2024 09:37), Max: 98.1 (27 Oct 2024 10:00)  HR: 83 (28 Oct 2024 09:37) (73 - 100)  BP: 150/85 (28 Oct 2024 09:37) (140/87 - 151/90)  BP(mean): --  RR: 18 (28 Oct 2024 09:37) (18 - 18)  SpO2: 95% (28 Oct 2024 09:37) (92% - 100%)    Parameters below as of 28 Oct 2024 09:37  Patient On (Oxygen Delivery Method): nasal cannula  O2 Flow (L/min): 1      GENERAL:  NAD, resting comfortably in bed  HEENT:  sclera anicteric  RESPIRATORY:  Normal Effort  CARDIAC:  HDS  ABDOMEN:  Soft, non-tender, non-distended  SKIN:  Warm & Dry. No jaundice  NEURO:  Alert, conversant, no focal deficit    LABS:                        5.8    8.33  )-----------( 204      ( 28 Oct 2024 06:55 )             15.9     Mean Cell Volume: 86.4 fL (10-28-24 @ 06:55)    10-28    133[L]  |  104  |  19  ----------------------------<  103[H]  4.9   |  20[L]  |  0.98    Ca    8.4      28 Oct 2024 06:55    TPro  6.5  /  Alb  3.6  /  TBili  5.1[H]  /  DBili  x   /  AST  97[H]  /  ALT  34  /  AlkPhos  142[H]  10-28    LIVER FUNCTIONS - ( 28 Oct 2024 06:55 )  Alb: 3.6 g/dL / Pro: 6.5 g/dL / ALK PHOS: 142 U/L / ALT: 34 U/L / AST: 97 U/L / GGT: x              Interval Events:   no acute events  patient found to have staph epi bacteremia, on vancomycin   pain is intermittently controlled with regimen  patient is eating well, out of bed  No abdominal pain, N/V  T bili down to 5.1. AST/ALT down-trending. Acute viral hepatitis panel negative  MELD 17    Hospital Medications:  acetaminophen     Tablet .. 650 milliGRAM(s) Oral every 6 hours PRN  allopurinol 50 milliGRAM(s) Oral daily  diphenhydrAMINE 25 milliGRAM(s) Oral every 6 hours PRN  folic acid 1 milliGRAM(s) Oral every 24 hours  HYDROmorphone PCA (1 mG/mL) 30 milliLiter(s) PCA Continuous PCA Continuous  melatonin 3 milliGRAM(s) Oral at bedtime PRN  naloxone Injectable 0.1 milliGRAM(s) IV Push every 3 minutes PRN  ondansetron Injectable 4 milliGRAM(s) IV Push every 6 hours PRN  pantoprazole    Tablet 40 milliGRAM(s) Oral before breakfast  polyethylene glycol 3350 17 Gram(s) Oral daily  senna 2 Tablet(s) Oral at bedtime  sodium chloride 0.45%. 1000 milliLiter(s) IV Continuous <Continuous>  vancomycin  IVPB 1000 milliGRAM(s) IV Intermittent every 12 hours      ROS: See above.    PHYSICAL EXAM:   Vital Signs:  Vital Signs Last 24 Hrs  T(C): 36.5 (28 Oct 2024 09:37), Max: 36.7 (27 Oct 2024 10:00)  T(F): 97.7 (28 Oct 2024 09:37), Max: 98.1 (27 Oct 2024 10:00)  HR: 83 (28 Oct 2024 09:37) (73 - 100)  BP: 150/85 (28 Oct 2024 09:37) (140/87 - 151/90)  BP(mean): --  RR: 18 (28 Oct 2024 09:37) (18 - 18)  SpO2: 95% (28 Oct 2024 09:37) (92% - 100%)    Parameters below as of 28 Oct 2024 09:37  Patient On (Oxygen Delivery Method): nasal cannula  O2 Flow (L/min): 1      GENERAL:  NAD, resting comfortably in bed  HEENT:  sclera icteric  RESPIRATORY:  Normal Effort  CARDIAC:  HDS  ABDOMEN:  Soft, non-tender, non-distended  SKIN:  Warm & Dry. jaundice  NEURO:  Alert, conversant, no focal deficit    LABS:                        5.8    8.33  )-----------( 204      ( 28 Oct 2024 06:55 )             15.9     Mean Cell Volume: 86.4 fL (10-28-24 @ 06:55)    10-28    133[L]  |  104  |  19  ----------------------------<  103[H]  4.9   |  20[L]  |  0.98    Ca    8.4      28 Oct 2024 06:55    TPro  6.5  /  Alb  3.6  /  TBili  5.1[H]  /  DBili  x   /  AST  97[H]  /  ALT  34  /  AlkPhos  142[H]  10-28    LIVER FUNCTIONS - ( 28 Oct 2024 06:55 )  Alb: 3.6 g/dL / Pro: 6.5 g/dL / ALK PHOS: 142 U/L / ALT: 34 U/L / AST: 97 U/L / GGT: x

## 2024-10-28 NOTE — PROGRESS NOTE ADULT - ASSESSMENT
Mr. 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 Hepatology is consulted for abnormal liver chemistry.    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 Current elevations in liver tests likely reflect active sickling rather than a new or different acute process, and are now improving. Acute viral hepatitis panel is negative. 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 A work-up for alternate etiologies of acute and chronic liver disease was completed during a recent admission in August.     CTA Abdomen/Pelvis demonstrates cirrhosis without features of portal hypertension including ascites. Vessels are clear without thrombosis. MRCP in 3/2024 demonstrated cirrhotic morphology with iron deposition in the liver compatible with hemosiderosis from chronic transfusions. There is no evidence of clinical decompensation of patient's cirrhosis such as ascites, hepatic encephalopathy or variceal hemorrhage. MELD 3.0 score is 17.     Patient was counseled on his diagnosis of cirrhosis, potential complications and need for long-term regular follow-up for monitoring and management of his liver condition.     Recommendations:   - Management of sickle cell crisis per primary team  - Treatment of CLABSI per ID  - Supportive care  - Trend CMP and direct bilirubin daily  - Ensure follow up with Hepatology upon discharge for long-term care    Follow up in Hepatology Clinic: [phone] ([facility] at [address] [place] [address]) or [phone] ([facility] at [address] [place] [address]) or [phone] ([facility] at [address] [place] [address])     Brandon Nguyen MD  Gastroenterology/Hepatology Fellow  Available via [employer] Teams  Pager: [phone]    On Weekdays after 5 PM to 8AM and Weekends/Holidays (All day)  For non-urgent consults, please email [email or web address] or [email or web address]  For urgent consults, please contact on call GI team.  See Rubi schedule ([facility]), zliening system - [phone] ([facility]), or call hospital  ([facility]) Mr. 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 Hepatology is consulted for abnormal liver chemistry.    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 Current elevations in liver tests likely reflect active sickling rather than a new or different acute process, and are now improving. Acute viral hepatitis panel is negative. 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 A work-up for alternate etiologies of acute and chronic liver disease was completed during a recent admission in August.     CTA Abdomen/Pelvis demonstrates cirrhosis without features of portal hypertension including ascites. Vessels are clear without thrombosis. MRCP in 3/2024 demonstrated cirrhotic morphology with iron deposition in the liver compatible with hemosiderosis from chronic transfusions. There is no evidence of clinical decompensation of patient's cirrhosis such as ascites, hepatic encephalopathy or variceal hemorrhage. MELD 3.0 score is 17.     Patient was counseled on his diagnosis of cirrhosis, potential complications and need for long-term regular follow-up for monitoring and management of his liver condition.     Recommendations:   - Management of sickle cell crisis per primary team  - Treatment of CLABSI per ID  - Supportive care  - F/u EBV & CMV PCR  - Trend CMP and direct bilirubin daily  - Ensure follow up with Hepatology upon discharge for long-term care    Follow up in Hepatology Clinic: [phone] ([facility] at [address] [place] [address]) or [phone] ([facility] at [address] [place] [address]) or [phone] ([facility] at [address] [place] [address])     Brandon Nguyen MD  Gastroenterology/Hepatology Fellow  Available via [employer] Teams  Pager: [phone]    On Weekdays after 5 PM to 8AM and Weekends/Holidays (All day)  For non-urgent consults, please email [email or web address] or [email or web address]  For urgent consults, please contact on call GI team.  See Rubi schedule ([facility]), [employer] system - [phone] ([facility]), or call hospital  ([facility])

## 2024-10-28 NOTE — PROGRESS NOTE ADULT - PROBLEM SELECTOR PLAN 1
- patient with Mediport for over 10 years, has good outpatient followup never had infection from port before   - Bcx from 10/25 with MRSA in anaerobic and aerobic bottles   - currently on vancomycin 1g q12 hrs  - ID following, attempting to salvage port line however if MRSA remain positive will likely need to have port removed, as would be considered CLABSI   - f/u repeat BCx from 10/27 pending  - monitor for fevers

## 2024-10-28 NOTE — PROGRESS NOTE ADULT - ASSESSMENT
Overall, CLABSI, CoNS  - Vanco 1g q 12 (monitor levels)  - Will attempt to salvage catheter--if any signs of systemic infection, or inability to clear, or signs treatment failure, would have to be removed acutely  - Repeat BCXs q 48 until clear  - Monitor for alternate sources infection      Data/Rationale:  45 yo M with PMH significant for sickle cell anemia, cirrhosis, hemochromatosis, and gout who presented with a low hemoglobin count from outpatient, also reporting worsening for lower back and lower extremity pain similar to prior sickle cell crises  No fever, has leukocytosis  Medport in place ~10 years, no exterior signs infection  BCXs 4/4 CoNS  CT no PE, bilateral opacities, atelectasis, LAD  No other cardiac hardware  Never had infection of catheter prior  Would presume this is true CLABSI, but would attempt to salvage line    Brandon Esparza MD  Contact on TEAMS messaging from 9am - 5pm  From 5pm-9am, on weekends, or if no response call [phone]

## 2024-10-28 NOTE — PROGRESS NOTE ADULT - PROBLEM SELECTOR PLAN 2
- Hb 5.2 on presentation, baseline Hb 6-6.5  - s/p 1U PRBC in ED – patient’s outpatient physician had recommended 2U PRBC, may offer additional transfusion based on repeat labs  - received 1 unit of pRBCs on 10/25, now Hgb downtrending 5.8  - will continue to monitor, if continues to downtrend will give additional 1U prbcs   - caution with excessive transfusion in setting of hemochromatosis – c/w home chelating agents  - trend CBC, retic count, LDH, ferritin  - hyperbilirubinemia noted, attributable to hemolysis – trend  - hypotonic IVF  - incentive spirometry  - analgesia with hydromorphone PCA: demand dose 0.5mg, lockout 6 minutes, continuous rate 0mg/hr, 4h limit of 20mg  - po diphenhydramine – no indication for IV

## 2024-10-28 NOTE — PROGRESS NOTE ADULT - SUBJECTIVE AND OBJECTIVE BOX
[facility] Division of Hospital Medicine  Abhinav Gibbons M.D  Pager [phone]  Available via MS Teams    SUBJECTIVE / OVERNIGHT EVENTS: Patient feels okay this AM. Pain is controlled this AM on current PCA settings. Hebert any headaches SOB, fevers, chills     ADDITIONAL REVIEW OF SYSTEMS:    MEDICATIONS  (STANDING):  allopurinol 50 milliGRAM(s) Oral daily  folic acid 1 milliGRAM(s) Oral every 24 hours  HYDROmorphone PCA (1 mG/mL) 30 milliLiter(s) PCA Continuous PCA Continuous  pantoprazole    Tablet 40 milliGRAM(s) Oral before breakfast  polyethylene glycol 3350 17 Gram(s) Oral daily  senna 2 Tablet(s) Oral at bedtime  sodium chloride 0.45%. 1000 milliLiter(s) (100 mL/Hr) IV Continuous <Continuous>  vancomycin  IVPB 1000 milliGRAM(s) IV Intermittent every 12 hours    MEDICATIONS  (PRN):  acetaminophen     Tablet .. 650 milliGRAM(s) Oral every 6 hours PRN Temp greater or equal to 38C (100.4F), Mild Pain (1 - 3)  diphenhydrAMINE 25 milliGRAM(s) Oral every 6 hours PRN Rash and/or Itching  melatonin 3 milliGRAM(s) Oral at bedtime PRN Insomnia  naloxone Injectable 0.1 milliGRAM(s) IV Push every 3 minutes PRN For ANY of the following changes in patient status:  A. RR LESS THAN 10 breaths per minute, B. Oxygen saturation LESS THAN 90%, C. Sedation score of 6  ondansetron Injectable 4 milliGRAM(s) IV Push every 6 hours PRN Nausea      I&O's Summary      PHYSICAL EXAM:  Vital Signs Last 24 Hrs  T(C): 36.8 (28 Oct 2024 13:19), Max: 36.8 (28 Oct 2024 13:19)  T(F): 98.2 (28 Oct 2024 13:19), Max: 98.2 (28 Oct 2024 13:19)  HR: 81 (28 Oct 2024 13:19) (73 - 84)  BP: 161/90 (28 Oct 2024 13:19) (140/87 - 161/90)  BP(mean): --  RR: 18 (28 Oct 2024 13:19) (18 - 18)  SpO2: 95% (28 Oct 2024 13:19) (95% - 100%)    Parameters below as of 28 Oct 2024 13:19  Patient On (Oxygen Delivery Method): nasal cannula  O2 Flow (L/min): 1    CONSTITUTIONAL: NAD, well-developed, well-groomed, + scleral icterus   RESPIRATORY: Normal respiratory effort; lungs are clear to auscultation bilaterally  CARDIOVASCULAR: Regular rate and rhythm, normal S1 and S2, no murmur/rub/gallop; 2+ lower extremity edema  ABDOMEN: Nontender to palpation, normoactive bowel sounds, no rebound/guarding; No hepatosplenomegaly  MUSCULOSKELETAL:  Normal gait; no clubbing or cyanosis of digits; no joint swelling or tenderness to palpation  PSYCH: A+O to person, place, and time  NEUROLOGY: CN 2-12 are intact and symmetric; no gross sensory deficits   SKIN: No rashes; no palpable lesions    LABS:                        5.8    8.33  )-----------( 204      ( 28 Oct 2024 06:55 )             15.9     10-28    133[L]  |  104  |  19  ----------------------------<  103[H]  4.9   |  20[L]  |  0.98    Ca    8.4      28 Oct 2024 06:55    TPro  6.5  /  Alb  3.6  /  TBili  5.1[H]  /  DBili  x   /  AST  97[H]  /  ALT  34  /  AlkPhos  142[H]  10-28          Urinalysis Basic - ( 28 Oct 2024 06:55 )    Color: x / Appearance: x / SG: x / pH: x  Gluc: 103 mg/dL / Ketone: x  / Bili: x / Urobili: x   Blood: x / Protein: x / Nitrite: x   Leuk Esterase: x / RBC: x / WBC x   Sq Epi: x / Non Sq Epi: x / Bacteria: x

## 2024-10-29 LAB
ALBUMIN SERPL ELPH-MCNC: 3.6 G/DL — SIGNIFICANT CHANGE UP (ref 3.3–5)
ALP SERPL-CCNC: 145 U/L — HIGH (ref 40–120)
ALT FLD-CCNC: 34 U/L — SIGNIFICANT CHANGE UP (ref 4–41)
ANION GAP SERPL CALC-SCNC: 13 MMOL/L — SIGNIFICANT CHANGE UP (ref 7–14)
AST SERPL-CCNC: 111 U/L — HIGH (ref 4–40)
BASOPHILS # BLD AUTO: 0.09 K/UL — SIGNIFICANT CHANGE UP (ref 0–0.2)
BASOPHILS NFR BLD AUTO: 1.2 % — SIGNIFICANT CHANGE UP (ref 0–2)
BILIRUB SERPL-MCNC: 5.7 MG/DL — HIGH (ref 0.2–1.2)
BLD GP AB SCN SERPL QL: NEGATIVE — SIGNIFICANT CHANGE UP
BUN SERPL-MCNC: 21 MG/DL — SIGNIFICANT CHANGE UP (ref 7–23)
CALCIUM SERPL-MCNC: 8.7 MG/DL — SIGNIFICANT CHANGE UP (ref 8.4–10.5)
CHLORIDE SERPL-SCNC: 102 MMOL/L — SIGNIFICANT CHANGE UP (ref 98–107)
CO2 SERPL-SCNC: 19 MMOL/L — LOW (ref 22–31)
CREAT SERPL-MCNC: 0.86 MG/DL — SIGNIFICANT CHANGE UP (ref 0.5–1.3)
EGFR: 109 ML/MIN/1.73M2 — SIGNIFICANT CHANGE UP
EOSINOPHIL # BLD AUTO: 0.45 K/UL — SIGNIFICANT CHANGE UP (ref 0–0.5)
EOSINOPHIL NFR BLD AUTO: 6.1 % — HIGH (ref 0–6)
GLUCOSE SERPL-MCNC: 93 MG/DL — SIGNIFICANT CHANGE UP (ref 70–99)
HCT VFR BLD CALC: 15.7 % — CRITICAL LOW (ref 39–50)
HGB BLD-MCNC: 5.5 G/DL — CRITICAL LOW (ref 13–17)
IANC: 3.18 K/UL — SIGNIFICANT CHANGE UP (ref 1.8–7.4)
IMM GRANULOCYTES NFR BLD AUTO: 0.4 % — SIGNIFICANT CHANGE UP (ref 0–0.9)
LYMPHOCYTES # BLD AUTO: 2.59 K/UL — SIGNIFICANT CHANGE UP (ref 1–3.3)
LYMPHOCYTES # BLD AUTO: 35.2 % — SIGNIFICANT CHANGE UP (ref 13–44)
MAGNESIUM SERPL-MCNC: 1.6 MG/DL — SIGNIFICANT CHANGE UP (ref 1.6–2.6)
MCHC RBC-ENTMCNC: 30.4 PG — SIGNIFICANT CHANGE UP (ref 27–34)
MCHC RBC-ENTMCNC: 35 GM/DL — SIGNIFICANT CHANGE UP (ref 32–36)
MCV RBC AUTO: 86.7 FL — SIGNIFICANT CHANGE UP (ref 80–100)
MONOCYTES # BLD AUTO: 1.01 K/UL — HIGH (ref 0–0.9)
MONOCYTES NFR BLD AUTO: 13.7 % — SIGNIFICANT CHANGE UP (ref 2–14)
MRSA PCR RESULT.: SIGNIFICANT CHANGE UP
NEUTROPHILS # BLD AUTO: 3.18 K/UL — SIGNIFICANT CHANGE UP (ref 1.8–7.4)
NEUTROPHILS NFR BLD AUTO: 43.4 % — SIGNIFICANT CHANGE UP (ref 43–77)
NRBC # BLD: 2 /100 WBCS — HIGH (ref 0–0)
NRBC # FLD: 0.12 K/UL — HIGH (ref 0–0)
PHOSPHATE SERPL-MCNC: 3.6 MG/DL — SIGNIFICANT CHANGE UP (ref 2.5–4.5)
PLATELET # BLD AUTO: 200 K/UL — SIGNIFICANT CHANGE UP (ref 150–400)
POTASSIUM SERPL-MCNC: 4.7 MMOL/L — SIGNIFICANT CHANGE UP (ref 3.5–5.3)
POTASSIUM SERPL-SCNC: 4.7 MMOL/L — SIGNIFICANT CHANGE UP (ref 3.5–5.3)
PROT SERPL-MCNC: 7 G/DL — SIGNIFICANT CHANGE UP (ref 6–8.3)
RBC # BLD: 1.81 M/UL — LOW (ref 4.2–5.8)
RBC # FLD: 24.1 % — HIGH (ref 10.3–14.5)
RH IG SCN BLD-IMP: POSITIVE — SIGNIFICANT CHANGE UP
S AUREUS DNA NOSE QL NAA+PROBE: SIGNIFICANT CHANGE UP
SODIUM SERPL-SCNC: 134 MMOL/L — LOW (ref 135–145)
VANCOMYCIN TROUGH SERPL-MCNC: 15.6 UG/ML — SIGNIFICANT CHANGE UP (ref 10–20)
WBC # BLD: 7.35 K/UL — SIGNIFICANT CHANGE UP (ref 3.8–10.5)
WBC # FLD AUTO: 7.35 K/UL — SIGNIFICANT CHANGE UP (ref 3.8–10.5)

## 2024-10-29 PROCEDURE — 99233 SBSQ HOSP IP/OBS HIGH 50: CPT

## 2024-10-29 PROCEDURE — 99232 SBSQ HOSP IP/OBS MODERATE 35: CPT

## 2024-10-29 RX ORDER — DIPHENHYDRAMINE HCL 12.5MG/5ML
25 ELIXIR ORAL ONCE
Refills: 0 | Status: COMPLETED | OUTPATIENT
Start: 2024-10-29 | End: 2024-10-29

## 2024-10-29 RX ORDER — CHLORHEXIDINE GLUCONATE 40 MG/ML
1 SOLUTION TOPICAL
Refills: 0 | Status: DISCONTINUED | OUTPATIENT
Start: 2024-10-29 | End: 2024-11-14

## 2024-10-29 RX ADMIN — Medication 30 MILLILITER(S): at 19:57

## 2024-10-29 RX ADMIN — Medication 50 MILLIGRAM(S): at 11:48

## 2024-10-29 RX ADMIN — PANTOPRAZOLE SODIUM 40 MILLIGRAM(S): 40 TABLET, DELAYED RELEASE ORAL at 06:48

## 2024-10-29 RX ADMIN — Medication 25 MILLIGRAM(S): at 22:50

## 2024-10-29 RX ADMIN — CHLORHEXIDINE GLUCONATE 1 APPLICATION(S): 40 SOLUTION TOPICAL at 11:49

## 2024-10-29 RX ADMIN — Medication 30 MILLILITER(S): at 08:36

## 2024-10-29 RX ADMIN — VANCOMYCIN HYDROCHLORIDE 250 MILLIGRAM(S): KIT at 18:03

## 2024-10-29 RX ADMIN — Medication 2 TABLET(S): at 21:48

## 2024-10-29 RX ADMIN — Medication 650 MILLIGRAM(S): at 22:06

## 2024-10-29 RX ADMIN — VANCOMYCIN HYDROCHLORIDE 250 MILLIGRAM(S): KIT at 06:49

## 2024-10-29 RX ADMIN — Medication 100 MILLILITER(S): at 18:03

## 2024-10-29 RX ADMIN — FOLIC ACID 1 MILLIGRAM(S): 1 TABLET ORAL at 11:48

## 2024-10-29 RX ADMIN — Medication 650 MILLIGRAM(S): at 23:06

## 2024-10-29 NOTE — PROGRESS NOTE ADULT - PROBLEM SELECTOR PLAN 5
- in setting of acute sickle cell crisis  - c/w hypotonic IVF  - now resolved   - avoid NSAIDs, other nephrotoxic agents    Some edema noted on b/l LE and elevated proBNP, will still offer hypotonic IVF but will need to consider hepatorenal etiology.

## 2024-10-29 NOTE — PROGRESS NOTE ADULT - PROBLEM SELECTOR PLAN 1
- patient with Mediport for over 10 years, has good outpatient followup never had infection from port before   - Bcx from 10/25 with MRSA in anaerobic and aerobic bottles   - currently on vancomycin 1g q12 hrs  - ID following, attempting to salvage port line however if MRSA remain positive will likely need to have port removed, as would be considered CLABSI   - repeat BCx from 10/27, 1 set negative x24hrs; spoke with ID attending no need for additional set on BCx   - f/u ID recs on abx course   - monitor for fevers or any signs of systemic infection, if so will need to have port removed acutely ( patient aware and amenable if necessary)   - monitor for fevers

## 2024-10-29 NOTE — PROGRESS NOTE ADULT - SUBJECTIVE AND OBJECTIVE BOX
CC: F/U for CoNS    Saw/spoke to patient. No fevers, no chills. No new complaints.    Allergies  ceftriaxone (Anaphylaxis)  piperacillin-tazobactam (Urticaria)  penicillin (Pruritus)  hydroxyurea (Other)    ANTIMICROBIALS:  vancomycin  IVPB 1000 every 12 hours    PE:    Vital Signs Last 24 Hrs  T(C): 36.7 (29 Oct 2024 13:01), Max: 37 (28 Oct 2024 18:14)  T(F): 98.1 (29 Oct 2024 13:01), Max: 98.6 (28 Oct 2024 18:14)  HR: 94 (29 Oct 2024 13:01) (82 - 97)  BP: 149/78 (29 Oct 2024 13:01) (145/87 - 164/82)  RR: 17 (29 Oct 2024 13:01) (17 - 18)  SpO2: 98% (29 Oct 2024 13:01) (95% - 98%)    Gen: AOx3, NAD, non-toxic  CV: Nontachycardic  Resp: Breathing comfortably, RA  Abd: Soft, nontender  IV/Skin: No thrombophlebitis    LABS:                        5.5    7.35  )-----------( 200      ( 29 Oct 2024 06:00 )             15.7     10-29    134[L]  |  102  |  21  ----------------------------<  93  4.7   |  19[L]  |  0.86    Ca    8.7      29 Oct 2024 06:00  Phos  3.6     10-29  Mg     1.60     10-29    TPro  7.0  /  Alb  3.6  /  TBili  5.7[H]  /  DBili  x   /  AST  111[H]  /  ALT  34  /  AlkPhos  145[H]  10-29    Urinalysis Basic - ( 29 Oct 2024 06:00 )    Color: x / Appearance: x / SG: x / pH: x  Gluc: 93 mg/dL / Ketone: x  / Bili: x / Urobili: x   Blood: x / Protein: x / Nitrite: x   Leuk Esterase: x / RBC: x / WBC x   Sq Epi: x / Non Sq Epi: x / Bacteria: x    MICROBIOLOGY:  Vancomycin Level, Trough: 15.6 ug/mL (10-29-24 @ 06:00)    .Blood BLOOD  10-27-24   No growth at 24 hours  --  --    .Blood BLOOD  10-25-24   Growth in aerobic and anaerobic bottles: Staphylococcus epidermidis  Direct identification is available within approximately 3-5  hours either by Blood Panel Multiplexed PCR or Direct  MALDI-TOF. Details: [email or web address]  --  Blood Culture PCR  Staphylococcus epidermidis    CMVPCR Log: NotDetec [id number]/mL (10-26 @ 06:57)    RADIOLOGY:    10/25    IMPRESSION:  No pulmonary embolus.    Scattered bilateral subsegmental opacities may represent atelectasis.    Stable upper abdominal lymphadenopathy.

## 2024-10-29 NOTE — PROGRESS NOTE ADULT - SUBJECTIVE AND OBJECTIVE BOX
[facility] Division of Hospital Medicine  ARMINDAto Shai VIGIL  Pager [phone]  Available via MS Teams    SUBJECTIVE / OVERNIGHT EVENTS: No acute events overnight. Patient states that he feels more fatigued believes from his blood levels being low.    ADDITIONAL REVIEW OF SYSTEMS:    MEDICATIONS  (STANDING):  allopurinol 50 milliGRAM(s) Oral daily  chlorhexidine 2% Cloths 1 Application(s) Topical <User Schedule>  folic acid 1 milliGRAM(s) Oral every 24 hours  HYDROmorphone PCA (1 mG/mL) 30 milliLiter(s) PCA Continuous PCA Continuous  pantoprazole    Tablet 40 milliGRAM(s) Oral before breakfast  polyethylene glycol 3350 17 Gram(s) Oral daily  senna 2 Tablet(s) Oral at bedtime  sodium chloride 0.45%. 1000 milliLiter(s) (100 mL/Hr) IV Continuous <Continuous>  vancomycin  IVPB 1000 milliGRAM(s) IV Intermittent every 12 hours    MEDICATIONS  (PRN):  acetaminophen     Tablet .. 650 milliGRAM(s) Oral every 6 hours PRN Temp greater or equal to 38C (100.4F), Mild Pain (1 - 3)  diphenhydrAMINE 25 milliGRAM(s) Oral every 6 hours PRN Rash and/or Itching  melatonin 3 milliGRAM(s) Oral at bedtime PRN Insomnia  naloxone Injectable 0.1 milliGRAM(s) IV Push every 3 minutes PRN For ANY of the following changes in patient status:  A. RR LESS THAN 10 breaths per minute, B. Oxygen saturation LESS THAN 90%, C. Sedation score of 6  ondansetron Injectable 4 milliGRAM(s) IV Push every 6 hours PRN Nausea      I&O's Summary      PHYSICAL EXAM:  Vital Signs Last 24 Hrs  T(C): 36.7 (29 Oct 2024 13:01), Max: 37 (28 Oct 2024 18:14)  T(F): 98.1 (29 Oct 2024 13:01), Max: 98.6 (28 Oct 2024 18:14)  HR: 94 (29 Oct 2024 13:01) (82 - 97)  BP: 149/78 (29 Oct 2024 13:01) (145/87 - 164/82)  BP(mean): --  RR: 17 (29 Oct 2024 13:01) (17 - 18)  SpO2: 98% (29 Oct 2024 13:01) (95% - 98%)    Parameters below as of 29 Oct 2024 13:01  Patient On (Oxygen Delivery Method): nasal cannula  O2 Flow (L/min): 2    CONSTITUTIONAL: NAD, scleral icterus   RESPIRATORY: Normal respiratory effort; lungs are clear to auscultation bilaterally  CARDIOVASCULAR: Regular rate and rhythm, normal S1 and S2, no murmur/rub/gallop;  2+ lower extremity edema  ABDOMEN: Nontender to palpation, normoactive bowel sounds, no rebound/guarding  MUSCULOSKELETAL: no joint swelling or tenderness to palpation  PSYCH: A+O to person, place, and time; affect appropriate  NEUROLOGY: CN 2-12 are intact and symmetric; no gross sensory deficits   SKIN: No rashes; no palpable lesions    LABS:                        5.5    7.35  )-----------( 200      ( 29 Oct 2024 06:00 )             15.7     10-29    134[L]  |  102  |  21  ----------------------------<  93  4.7   |  19[L]  |  0.86    Ca    8.7      29 Oct 2024 06:00  Phos  3.6     10-29  Mg     1.60     10-29    TPro  7.0  /  Alb  3.6  /  TBili  5.7[H]  /  DBili  x   /  AST  111[H]  /  ALT  34  /  AlkPhos  145[H]  10-29          Urinalysis Basic - ( 29 Oct 2024 06:00 )    Color: x / Appearance: x / SG: x / pH: x  Gluc: 93 mg/dL / Ketone: x  / Bili: x / Urobili: x   Blood: x / Protein: x / Nitrite: x   Leuk Esterase: x / RBC: x / WBC x   Sq Epi: x / Non Sq Epi: x / Bacteria: x        Culture - Blood (collected 27 Oct 2024 15:50)  Source: .Blood BLOOD  Preliminary Report (28 Oct 2024 19:01):    No growth at 24 hours

## 2024-10-29 NOTE — PROGRESS NOTE ADULT - ASSESSMENT
Overall, CLABSI, CoNS  - Vanco 1g q 12 (monitor levels)  - Will attempt to salvage catheter--if any signs of systemic infection, or inability to clear, or signs treatment failure, would have to be removed acutely  - F/U BCXs to ensure clear  - Monitor for alternate sources infection  - If BCXs clear, anticipate plan will be for Vanco x 2 weeks from negative BCX; check CBC, BUN, Cr, LFTs, Vanco level weekly, send to [email or web address]      Data/Rationale:  45 yo M with PMH significant for sickle cell anemia, cirrhosis, hemochromatosis, and gout who presented with a low hemoglobin count from outpatient, also reporting worsening for lower back and lower extremity pain similar to prior sickle cell crises  No fever, has leukocytosis  Medport in place ~10 years, no exterior signs infection  BCXs 4/4 CoNS  CT no PE, bilateral opacities, atelectasis, LAD  No other cardiac hardware  Never had infection of catheter prior  Would presume this is true CLABSI, but would attempt to salvage line    Brandon Esparza MD  Contact on TEAMS messaging from 9am - 5pm  From 5pm-9am, on weekends, or if no response call [phone]

## 2024-10-29 NOTE — PROGRESS NOTE ADULT - PROBLEM SELECTOR PLAN 2
- Hb 5.2 on presentation, baseline Hb 6-6.5  - s/p 1U PRBC in ED – patient’s outpatient physician had recommended 2U PRBC, may offer additional transfusion based on repeat labs  - received 1 unit of pRBCs on 10/25, now Hgb downtrending 5.5  - ordered for additional unit of prbcs x1 10/29   - caution with excessive transfusion in setting of hemochromatosis – c/w home chelating agents  - trend CBC, retic count, LDH, ferritin  - hyperbilirubinemia noted, attributable to hemolysis – trend  - hypotonic IVF  - incentive spirometry  - analgesia with hydromorphone PCA: demand dose 0.5mg, lockout 6 minutes, continuous rate 0mg/hr, 4h limit of 20mg  - po diphenhydramine – no indication for IV

## 2024-10-30 LAB
ALBUMIN SERPL ELPH-MCNC: 3.6 G/DL — SIGNIFICANT CHANGE UP (ref 3.3–5)
ALP SERPL-CCNC: 130 U/L — HIGH (ref 40–120)
ALT FLD-CCNC: 34 U/L — SIGNIFICANT CHANGE UP (ref 4–41)
ANION GAP SERPL CALC-SCNC: 9 MMOL/L — SIGNIFICANT CHANGE UP (ref 7–14)
AST SERPL-CCNC: 153 U/L — HIGH (ref 4–40)
BILIRUB SERPL-MCNC: 5.5 MG/DL — HIGH (ref 0.2–1.2)
BUN SERPL-MCNC: 22 MG/DL — SIGNIFICANT CHANGE UP (ref 7–23)
CALCIUM SERPL-MCNC: 8.9 MG/DL — SIGNIFICANT CHANGE UP (ref 8.4–10.5)
CHLORIDE SERPL-SCNC: 105 MMOL/L — SIGNIFICANT CHANGE UP (ref 98–107)
CO2 SERPL-SCNC: 21 MMOL/L — LOW (ref 22–31)
CREAT SERPL-MCNC: 0.84 MG/DL — SIGNIFICANT CHANGE UP (ref 0.5–1.3)
EGFR: 110 ML/MIN/1.73M2 — SIGNIFICANT CHANGE UP
GLUCOSE SERPL-MCNC: 113 MG/DL — HIGH (ref 70–99)
HCT VFR BLD CALC: 18.1 % — CRITICAL LOW (ref 39–50)
HGB BLD-MCNC: 6.5 G/DL — CRITICAL LOW (ref 13–17)
MAGNESIUM SERPL-MCNC: 1.4 MG/DL — LOW (ref 1.6–2.6)
MCHC RBC-ENTMCNC: 29.8 PG — SIGNIFICANT CHANGE UP (ref 27–34)
MCHC RBC-ENTMCNC: 35.9 G/DL — SIGNIFICANT CHANGE UP (ref 32–36)
MCV RBC AUTO: 83 FL — SIGNIFICANT CHANGE UP (ref 80–100)
NRBC # BLD: 0 /100 WBCS — SIGNIFICANT CHANGE UP (ref 0–0)
NRBC # FLD: 0.06 K/UL — HIGH (ref 0–0)
PHOSPHATE SERPL-MCNC: 2.8 MG/DL — SIGNIFICANT CHANGE UP (ref 2.5–4.5)
PLATELET # BLD AUTO: 222 K/UL — SIGNIFICANT CHANGE UP (ref 150–400)
POTASSIUM SERPL-MCNC: 4.2 MMOL/L — SIGNIFICANT CHANGE UP (ref 3.5–5.3)
POTASSIUM SERPL-SCNC: 4.2 MMOL/L — SIGNIFICANT CHANGE UP (ref 3.5–5.3)
PROT SERPL-MCNC: 6.9 G/DL — SIGNIFICANT CHANGE UP (ref 6–8.3)
RBC # BLD: 2.18 M/UL — LOW (ref 4.2–5.8)
RBC # FLD: 22.3 % — HIGH (ref 10.3–14.5)
SODIUM SERPL-SCNC: 135 MMOL/L — SIGNIFICANT CHANGE UP (ref 135–145)
WBC # BLD: 8.12 K/UL — SIGNIFICANT CHANGE UP (ref 3.8–10.5)
WBC # FLD AUTO: 8.12 K/UL — SIGNIFICANT CHANGE UP (ref 3.8–10.5)

## 2024-10-30 PROCEDURE — 99232 SBSQ HOSP IP/OBS MODERATE 35: CPT

## 2024-10-30 PROCEDURE — 99233 SBSQ HOSP IP/OBS HIGH 50: CPT

## 2024-10-30 RX ORDER — KETOROLAC TROMETHAMINE 30 MG/ML
15 INJECTION INTRAMUSCULAR; INTRAVENOUS ONCE
Refills: 0 | Status: DISCONTINUED | OUTPATIENT
Start: 2024-10-30 | End: 2024-10-30

## 2024-10-30 RX ADMIN — KETOROLAC TROMETHAMINE 15 MILLIGRAM(S): 30 INJECTION INTRAMUSCULAR; INTRAVENOUS at 18:45

## 2024-10-30 RX ADMIN — Medication 30 MILLILITER(S): at 07:36

## 2024-10-30 RX ADMIN — ONDANSETRON HYDROCHLORIDE 4 MILLIGRAM(S): 2 INJECTION, SOLUTION INTRAMUSCULAR; INTRAVENOUS at 07:41

## 2024-10-30 RX ADMIN — KETOROLAC TROMETHAMINE 15 MILLIGRAM(S): 30 INJECTION INTRAMUSCULAR; INTRAVENOUS at 18:09

## 2024-10-30 RX ADMIN — Medication 100 MILLILITER(S): at 05:51

## 2024-10-30 RX ADMIN — VANCOMYCIN HYDROCHLORIDE 250 MILLIGRAM(S): KIT at 05:52

## 2024-10-30 RX ADMIN — Medication 50 MILLIGRAM(S): at 11:07

## 2024-10-30 RX ADMIN — PANTOPRAZOLE SODIUM 40 MILLIGRAM(S): 40 TABLET, DELAYED RELEASE ORAL at 07:41

## 2024-10-30 RX ADMIN — Medication 2 TABLET(S): at 21:43

## 2024-10-30 RX ADMIN — Medication 30 MILLILITER(S): at 22:59

## 2024-10-30 RX ADMIN — Medication 30 MILLILITER(S): at 19:55

## 2024-10-30 RX ADMIN — Medication 100 MILLILITER(S): at 22:59

## 2024-10-30 RX ADMIN — FOLIC ACID 1 MILLIGRAM(S): 1 TABLET ORAL at 11:07

## 2024-10-30 RX ADMIN — VANCOMYCIN HYDROCHLORIDE 250 MILLIGRAM(S): KIT at 18:15

## 2024-10-30 RX ADMIN — CHLORHEXIDINE GLUCONATE 1 APPLICATION(S): 40 SOLUTION TOPICAL at 05:52

## 2024-10-30 NOTE — PROGRESS NOTE ADULT - PROBLEM SELECTOR PLAN 1
- patient with Mediport for over 10 years, has good outpatient followup never had infection from port before   - Bcx from 10/25 with MRSA in anaerobic and aerobic bottles   - currently on vancomycin 1g q12 hrs  - ID following, attempting to salvage port line however if MRSA remain positive will likely need to have port removed, as would be considered CLABSI (port placed prior to admission)   - repeat BCx from 10/27, 1 set negative x24hrs; spoke with ID attending no need for additional set on BCx   - BCx NGTD x 48 hrs   - ID recommending 2 weeks abx from negative Bcx on 10/27, till 11/10  - has port can be used for antibiotics   - monitor for fevers or any signs of systemic infection, if so will need to have port removed acutely ( patient aware and amenable if necessary)   - monitor for fevers

## 2024-10-30 NOTE — PROGRESS NOTE ADULT - SUBJECTIVE AND OBJECTIVE BOX
CC: F/U for CLABSI    Saw/spoke to patient. Unchanged. No new events.    Allergies  ceftriaxone (Anaphylaxis)  piperacillin-tazobactam (Urticaria)  penicillin (Pruritus)  hydroxyurea (Other)    ANTIMICROBIALS:  vancomycin  IVPB 1000 every 12 hours    PE:    Vital Signs Last 24 Hrs  T(C): 36.8 (30 Oct 2024 11:26), Max: 36.8 (29 Oct 2024 21:52)  T(F): 98.2 (30 Oct 2024 11:26), Max: 98.2 (29 Oct 2024 21:52)  HR: 81 (30 Oct 2024 11:26) (81 - 95)  BP: 163/90 (30 Oct 2024 11:26) (154/90 - 179/99)  RR: 18 (30 Oct 2024 11:26) (17 - 18)  SpO2: 95% (30 Oct 2024 11:26) (90% - 98%)    Gen: AOx3, NAD, non-toxic  CV: Nontachycardic  Resp: Breathing comfortably, RA  Abd: Soft, nontender  IV/Skin: No thrombophlebitis    LABS:                        5.5    7.35  )-----------( 200      ( 29 Oct 2024 06:00 )             15.7     10-29    134[L]  |  102  |  21  ----------------------------<  93  4.7   |  19[L]  |  0.86    Ca    8.7      29 Oct 2024 06:00  Phos  3.6     10-29  Mg     1.60     10-29    TPro  7.0  /  Alb  3.6  /  TBili  5.7[H]  /  DBili  x   /  AST  111[H]  /  ALT  34  /  AlkPhos  145[H]  10-29    Urinalysis Basic - ( 29 Oct 2024 06:00 )    Color: x / Appearance: x / SG: x / pH: x  Gluc: 93 mg/dL / Ketone: x  / Bili: x / Urobili: x   Blood: x / Protein: x / Nitrite: x   Leuk Esterase: x / RBC: x / WBC x   Sq Epi: x / Non Sq Epi: x / Bacteria: x    MICROBIOLOGY:    .Blood BLOOD  10-27-24   No growth at 48 Hours  --  --    .Blood BLOOD  10-25-24   Growth in aerobic and anaerobic bottles: Staphylococcus epidermidis  Direct identification is available within approximately 3-5  hours either by Blood Panel Multiplexed PCR or Direct  MALDI-TOF. Details: [email or web address]  --  Blood Culture PCR  Staphylococcus epidermidis    CMVPCR Log: NotDetec [id number]/mL (10-26 @ 06:57)    RADIOLOGY:    10/25    IMPRESSION:  No pulmonary embolus.    Scattered bilateral subsegmental opacities may represent atelectasis.    Stable upper abdominal lymphadenopathy.

## 2024-10-30 NOTE — PROGRESS NOTE ADULT - SUBJECTIVE AND OBJECTIVE BOX
[facility] Division of Hospital Medicine  ARMINDAto Shai VIGIL  Pager [phone]  Available via MS Teams    SUBJECTIVE / OVERNIGHT EVENTS: No acute events overnight. Patient upset as had small veins and required multiple sticks for labs this AM.     ADDITIONAL REVIEW OF SYSTEMS:    MEDICATIONS  (STANDING):  allopurinol 50 milliGRAM(s) Oral daily  chlorhexidine 2% Cloths 1 Application(s) Topical <User Schedule>  folic acid 1 milliGRAM(s) Oral every 24 hours  HYDROmorphone PCA (1 mG/mL) 30 milliLiter(s) PCA Continuous PCA Continuous  pantoprazole    Tablet 40 milliGRAM(s) Oral before breakfast  polyethylene glycol 3350 17 Gram(s) Oral daily  senna 2 Tablet(s) Oral at bedtime  sodium chloride 0.45%. 1000 milliLiter(s) (100 mL/Hr) IV Continuous <Continuous>  vancomycin  IVPB 1000 milliGRAM(s) IV Intermittent every 12 hours    MEDICATIONS  (PRN):  acetaminophen     Tablet .. 650 milliGRAM(s) Oral every 6 hours PRN Temp greater or equal to 38C (100.4F), Mild Pain (1 - 3)  diphenhydrAMINE 25 milliGRAM(s) Oral every 6 hours PRN Rash and/or Itching  melatonin 3 milliGRAM(s) Oral at bedtime PRN Insomnia  naloxone Injectable 0.1 milliGRAM(s) IV Push every 3 minutes PRN For ANY of the following changes in patient status:  A. RR LESS THAN 10 breaths per minute, B. Oxygen saturation LESS THAN 90%, C. Sedation score of 6  ondansetron Injectable 4 milliGRAM(s) IV Push every 6 hours PRN Nausea      I&O's Summary      PHYSICAL EXAM:  Vital Signs Last 24 Hrs  T(C): 36.8 (30 Oct 2024 11:26), Max: 36.8 (29 Oct 2024 21:52)  T(F): 98.2 (30 Oct 2024 11:26), Max: 98.2 (29 Oct 2024 21:52)  HR: 81 (30 Oct 2024 11:26) (81 - 95)  BP: 163/90 (30 Oct 2024 11:26) (154/90 - 179/99)  BP(mean): --  RR: 18 (30 Oct 2024 11:26) (17 - 18)  SpO2: 95% (30 Oct 2024 11:26) (84% - 98%)    Parameters below as of 30 Oct 2024 11:26  Patient On (Oxygen Delivery Method): nasal cannula  O2 Flow (L/min): 2    CONSTITUTIONAL: NAD, well-developed, well-groomed  EYES: scleral icterus   ABDOMEN: Nontender to palpation, normoactive bowel sounds, no rebound/guarding; No hepatosplenomegaly  MUSCULOSKELETAL:  Normal gait; no clubbing or cyanosis of digits; no joint swelling or tenderness to palpation  PSYCH: A+O to person, place, and time  NEUROLOGY: CN 2-12 are intact and symmetric; no gross sensory deficits   SKIN: No rashes; no palpable lesions    LABS:                        5.5    7.35  )-----------( 200      ( 29 Oct 2024 06:00 )             15.7     10-29    134[L]  |  102  |  21  ----------------------------<  93  4.7   |  19[L]  |  0.86    Ca    8.7      29 Oct 2024 06:00  Phos  3.6     10-29  Mg     1.60     10-29    TPro  7.0  /  Alb  3.6  /  TBili  5.7[H]  /  DBili  x   /  AST  111[H]  /  ALT  34  /  AlkPhos  145[H]  10-29          Urinalysis Basic - ( 29 Oct 2024 06:00 )    Color: x / Appearance: x / SG: x / pH: x  Gluc: 93 mg/dL / Ketone: x  / Bili: x / Urobili: x   Blood: x / Protein: x / Nitrite: x   Leuk Esterase: x / RBC: x / WBC x   Sq Epi: x / Non Sq Epi: x / Bacteria: x        Culture - Blood (collected 27 Oct 2024 15:50)  Source: .Blood BLOOD  Preliminary Report (29 Oct 2024 19:02):    No growth at 48 Hours

## 2024-10-30 NOTE — PROGRESS NOTE ADULT - ASSESSMENT
Overall, CLABSI, CoNS  - Vanco 1g q 12 (monitor levels), consider premedication with Zofran for apparent nausea episodes after Vanco doses  - Will attempt to salvage catheter--if any signs of systemic infection, or inability to clear, or signs treatment failure, would have to be removed acutely  - F/U BCXs to ensure clear  - Monitor for alternate sources infection  - If BCXs clear, anticipate plan will be for Vanco x 2 weeks from negative BCX; check CBC, BUN, Cr, LFTs, Vanco level weekly, send to [email or web address]      Data/Rationale:  45 yo M with PMH significant for sickle cell anemia, cirrhosis, hemochromatosis, and gout who presented with a low hemoglobin count from outpatient, also reporting worsening for lower back and lower extremity pain similar to prior sickle cell crises  No fever, has leukocytosis  Medport in place ~10 years, no exterior signs infection  BCXs 4/4 CoNS  CT no PE, bilateral opacities, atelectasis, LAD  No other cardiac hardware  Never had infection of catheter prior  Would presume this is true CLABSI, but would attempt to salvage line    Brandon Esparza MD  Contact on TEAMS messaging from 9am - 5pm  From 5pm-9am, on weekends, or if no response call [phone]

## 2024-10-30 NOTE — PROGRESS NOTE ADULT - PROBLEM SELECTOR PLAN 2
- Hb 5.2 on presentation, baseline Hb 6-6.5  - s/p 1U PRBC in ED – patient’s outpatient physician had recommended 2U PRBC, may offer additional transfusion based on repeat labs  - s/p 2 units of pRBCs on 10/25 and 10/29  - caution with excessive transfusion in setting of hemochromatosis – c/w home chelating agents  - trend CBC, retic count, LDH, ferritin  - hyperbilirubinemia noted, attributable to hemolysis – trend  - c/w hypotonic IVF  - c/w incentive spirometry  - analgesia with hydromorphone PCA: demand dose 0.5mg, lockout 6 minutes, continuous rate 0mg/hr, 4h limit of 20mg  - po diphenhydramine – no indication for IV

## 2024-10-31 LAB
ALBUMIN SERPL ELPH-MCNC: 3.4 G/DL — SIGNIFICANT CHANGE UP (ref 3.3–5)
ALP SERPL-CCNC: 124 U/L — HIGH (ref 40–120)
ALT FLD-CCNC: 36 U/L — SIGNIFICANT CHANGE UP (ref 4–41)
ANION GAP SERPL CALC-SCNC: 11 MMOL/L — SIGNIFICANT CHANGE UP (ref 7–14)
AST SERPL-CCNC: 137 U/L — HIGH (ref 4–40)
BASOPHILS # BLD AUTO: 0.13 K/UL — SIGNIFICANT CHANGE UP (ref 0–0.2)
BASOPHILS NFR BLD AUTO: 1.5 % — SIGNIFICANT CHANGE UP (ref 0–2)
BILIRUB SERPL-MCNC: 4.7 MG/DL — HIGH (ref 0.2–1.2)
BUN SERPL-MCNC: 32 MG/DL — HIGH (ref 7–23)
CALCIUM SERPL-MCNC: 8.7 MG/DL — SIGNIFICANT CHANGE UP (ref 8.4–10.5)
CHLORIDE SERPL-SCNC: 104 MMOL/L — SIGNIFICANT CHANGE UP (ref 98–107)
CO2 SERPL-SCNC: 19 MMOL/L — LOW (ref 22–31)
CREAT SERPL-MCNC: 1.36 MG/DL — HIGH (ref 0.5–1.3)
EGFR: 65 ML/MIN/1.73M2 — SIGNIFICANT CHANGE UP
EOSINOPHIL # BLD AUTO: 0.46 K/UL — SIGNIFICANT CHANGE UP (ref 0–0.5)
EOSINOPHIL NFR BLD AUTO: 5.2 % — SIGNIFICANT CHANGE UP (ref 0–6)
GLUCOSE SERPL-MCNC: 126 MG/DL — HIGH (ref 70–99)
HCT VFR BLD CALC: 17 % — CRITICAL LOW (ref 39–50)
HGB BLD-MCNC: 6 G/DL — CRITICAL LOW (ref 13–17)
IANC: 4.13 K/UL — SIGNIFICANT CHANGE UP (ref 1.8–7.4)
IMM GRANULOCYTES NFR BLD AUTO: 0.5 % — SIGNIFICANT CHANGE UP (ref 0–0.9)
LYMPHOCYTES # BLD AUTO: 2.86 K/UL — SIGNIFICANT CHANGE UP (ref 1–3.3)
LYMPHOCYTES # BLD AUTO: 32.4 % — SIGNIFICANT CHANGE UP (ref 13–44)
MAGNESIUM SERPL-MCNC: 1.5 MG/DL — LOW (ref 1.6–2.6)
MCHC RBC-ENTMCNC: 29.6 PG — SIGNIFICANT CHANGE UP (ref 27–34)
MCHC RBC-ENTMCNC: 35.3 G/DL — SIGNIFICANT CHANGE UP (ref 32–36)
MCV RBC AUTO: 83.7 FL — SIGNIFICANT CHANGE UP (ref 80–100)
MONOCYTES # BLD AUTO: 1.22 K/UL — HIGH (ref 0–0.9)
MONOCYTES NFR BLD AUTO: 13.8 % — SIGNIFICANT CHANGE UP (ref 2–14)
NEUTROPHILS # BLD AUTO: 4.13 K/UL — SIGNIFICANT CHANGE UP (ref 1.8–7.4)
NEUTROPHILS NFR BLD AUTO: 46.6 % — SIGNIFICANT CHANGE UP (ref 43–77)
NRBC # BLD: 0 /100 WBCS — SIGNIFICANT CHANGE UP (ref 0–0)
NRBC # FLD: 0.04 K/UL — HIGH (ref 0–0)
PHOSPHATE SERPL-MCNC: 3.4 MG/DL — SIGNIFICANT CHANGE UP (ref 2.5–4.5)
PLATELET # BLD AUTO: 202 K/UL — SIGNIFICANT CHANGE UP (ref 150–400)
POTASSIUM SERPL-MCNC: 4.5 MMOL/L — SIGNIFICANT CHANGE UP (ref 3.5–5.3)
POTASSIUM SERPL-SCNC: 4.5 MMOL/L — SIGNIFICANT CHANGE UP (ref 3.5–5.3)
PROT SERPL-MCNC: 6.7 G/DL — SIGNIFICANT CHANGE UP (ref 6–8.3)
RBC # BLD: 2.03 M/UL — LOW (ref 4.2–5.8)
RBC # FLD: 22.6 % — HIGH (ref 10.3–14.5)
SODIUM SERPL-SCNC: 134 MMOL/L — LOW (ref 135–145)
WBC # BLD: 8.84 K/UL — SIGNIFICANT CHANGE UP (ref 3.8–10.5)
WBC # FLD AUTO: 8.84 K/UL — SIGNIFICANT CHANGE UP (ref 3.8–10.5)

## 2024-10-31 PROCEDURE — 99233 SBSQ HOSP IP/OBS HIGH 50: CPT

## 2024-10-31 PROCEDURE — 99232 SBSQ HOSP IP/OBS MODERATE 35: CPT

## 2024-10-31 RX ORDER — MAGNESIUM SULFATE IN 0.9% NACL 2 G/50 ML
1 INTRAVENOUS SOLUTION, PIGGYBACK (ML) INTRAVENOUS ONCE
Refills: 0 | Status: COMPLETED | OUTPATIENT
Start: 2024-10-31 | End: 2024-10-31

## 2024-10-31 RX ADMIN — Medication 30 MILLILITER(S): at 07:31

## 2024-10-31 RX ADMIN — Medication 100 GRAM(S): at 09:16

## 2024-10-31 RX ADMIN — Medication 50 MILLIGRAM(S): at 09:16

## 2024-10-31 RX ADMIN — Medication 2 TABLET(S): at 22:10

## 2024-10-31 RX ADMIN — Medication 650 MILLIGRAM(S): at 01:44

## 2024-10-31 RX ADMIN — Medication 30 MILLILITER(S): at 21:49

## 2024-10-31 RX ADMIN — Medication 650 MILLIGRAM(S): at 02:30

## 2024-10-31 RX ADMIN — CHLORHEXIDINE GLUCONATE 1 APPLICATION(S): 40 SOLUTION TOPICAL at 05:16

## 2024-10-31 RX ADMIN — PANTOPRAZOLE SODIUM 40 MILLIGRAM(S): 40 TABLET, DELAYED RELEASE ORAL at 05:15

## 2024-10-31 RX ADMIN — VANCOMYCIN HYDROCHLORIDE 250 MILLIGRAM(S): KIT at 05:15

## 2024-10-31 RX ADMIN — Medication 30 MILLILITER(S): at 19:38

## 2024-10-31 RX ADMIN — FOLIC ACID 1 MILLIGRAM(S): 1 TABLET ORAL at 09:16

## 2024-10-31 RX ADMIN — Medication 100 MILLILITER(S): at 13:13

## 2024-10-31 RX ADMIN — Medication 100 MILLILITER(S): at 21:10

## 2024-10-31 NOTE — PROGRESS NOTE ADULT - SUBJECTIVE AND OBJECTIVE BOX
[facility] Division of Hospital Medicine  Abhinav Gibbons M.D  Pager [phone]  Available via MS Teams    SUBJECTIVE / OVERNIGHT EVENTS:    ADDITIONAL REVIEW OF SYSTEMS:    MEDICATIONS  (STANDING):  allopurinol 50 milliGRAM(s) Oral daily  chlorhexidine 2% Cloths 1 Application(s) Topical <User Schedule>  folic acid 1 milliGRAM(s) Oral every 24 hours  HYDROmorphone PCA (1 mG/mL) 30 milliLiter(s) PCA Continuous PCA Continuous  pantoprazole    Tablet 40 milliGRAM(s) Oral before breakfast  polyethylene glycol 3350 17 Gram(s) Oral daily  senna 2 Tablet(s) Oral at bedtime  sodium chloride 0.45%. 1000 milliLiter(s) (100 mL/Hr) IV Continuous <Continuous>  vancomycin  IVPB 1000 milliGRAM(s) IV Intermittent every 12 hours    MEDICATIONS  (PRN):  acetaminophen     Tablet .. 650 milliGRAM(s) Oral every 6 hours PRN Temp greater or equal to 38C (100.4F), Mild Pain (1 - 3)  diphenhydrAMINE 25 milliGRAM(s) Oral every 6 hours PRN Rash and/or Itching  melatonin 3 milliGRAM(s) Oral at bedtime PRN Insomnia  naloxone Injectable 0.1 milliGRAM(s) IV Push every 3 minutes PRN For ANY of the following changes in patient status:  A. RR LESS THAN 10 breaths per minute, B. Oxygen saturation LESS THAN 90%, C. Sedation score of 6  ondansetron Injectable 4 milliGRAM(s) IV Push every 6 hours PRN Nausea      I&O's Summary      PHYSICAL EXAM:  Vital Signs Last 24 Hrs  T(C): 36.6 (31 Oct 2024 09:00), Max: 36.7 (30 Oct 2024 18:11)  T(F): 97.8 (31 Oct 2024 09:00), Max: 98.1 (30 Oct 2024 18:11)  HR: 84 (31 Oct 2024 09:00) (81 - 99)  BP: 169/89 (31 Oct 2024 09:00) (157/88 - 169/89)  BP(mean): --  RR: 18 (31 Oct 2024 09:00) (18 - 18)  SpO2: 99% (31 Oct 2024 09:00) (94% - 99%)    Parameters below as of 31 Oct 2024 09:00  Patient On (Oxygen Delivery Method): nasal cannula  O2 Flow (L/min): 2    CONSTITUTIONAL: NAD, well-developed, well-groomed  EYES: scleral icterus   ABDOMEN: Nontender to palpation, normoactive bowel sounds, no rebound/guarding; No hepatosplenomegaly  MUSCULOSKELETAL:  Normal gait; no clubbing or cyanosis of digits; no joint swelling or tenderness to palpation  PSYCH: A+O to person, place, and time  NEUROLOGY: CN 2-12 are intact and symmetric; no gross sensory deficits   LABS:                        6.0    8.84  )-----------( 202      ( 31 Oct 2024 06:50 )             17.0     10-31    134[L]  |  104  |  32[H]  ----------------------------<  126[H]  4.5   |  19[L]  |  1.36[H]    Ca    8.7      31 Oct 2024 06:50  Phos  3.4     10-31  Mg     1.50     10-31    TPro  6.7  /  Alb  3.4  /  TBili  4.7[H]  /  DBili  x   /  AST  137[H]  /  ALT  36  /  AlkPhos  124[H]  10-31          Urinalysis Basic - ( 31 Oct 2024 06:50 )    Color: x / Appearance: x / SG: x / pH: x  Gluc: 126 mg/dL / Ketone: x  / Bili: x / Urobili: x   Blood: x / Protein: x / Nitrite: x   Leuk Esterase: x / RBC: x / WBC x   Sq Epi: x / Non Sq Epi: x / Bacteria: x

## 2024-10-31 NOTE — CONSULT NOTE ADULT - NS ATTEND AMEND GEN_ALL_CORE FT
Agree with above assessment and plan.     Consistently around hemoglobin 5-6. Typically when he is <5.5 receiving transfusion. Continue supportive measures as above and transfuse to keep hemoglobin >5.5. Outpatient follow up with Dr Jordan at [facility]. Iron overload on chelation to continue. Hold oxbryta as has been removed from market.

## 2024-10-31 NOTE — CONSULT NOTE ADULT - NS ATTEND OPT1 GEN_ALL_CORE
I attest my time as attending is greater than 50% of the total combined time spent on qualifying patient care activities by the PA/NP and attending.
39.5

## 2024-10-31 NOTE — CONSULT NOTE ADULT - SUBJECTIVE AND OBJECTIVE BOX
Reason for consult: anemia    HPI:  Patient is a 45yo M with PMH significant for sickle cell anemia, cirrhosis, hemochromatosis, and gout who presented with a low hemoglobin count from outpatient, also reporting worsening for lower back and lower extremity pain similar to prior sickle cell crises. He reports ongoing pain. Denies chest pain, but admits to some exertional dyspnea which he attributes to his acute anemia.   He was seen by his outpatient physician and found to be anemic, recommended for 2U PRBC transfusion.    Vital signs significant for: afebrile, initially tachycardic to 125bpm down to 70-80s, hypertensive up to 160/84 now improved to SBP 140s, RR 18-20, SpO2 96% on 2L NC  Labs significant for: Hb 5.2 (baseline ~6-6.5), WBC 12.10, plt 249, retic percent 18.7% (prior 8.7%), abs retic 311.5, Tbili 7.9, Dbili 3.1, LDH 1492, total iron 428, iron sat 79%, ferritin 3957, Cr 1.55, AG 16, CO2 16, BUN 29,  (prior 138),  (prior 86), ALT 49 (prior 49), proBNP 1830, VBG pH 7.31, lactate 0.7.   Imaging significant for:   - CXR: no focal consolidations  - CTA chest/abd/pelvis: no pulmonary embolism. Scattered bilateral subsegmental opacities may represent atelectasis. Stable upper abdominal lymphadenopathy. Calcified autoinfarcted spleen. Hypertrophied lateral segment of the left hepatic lobe along with hepatorenal notching compatible with cirrhotic morphology.   (25 Oct 2024 09:58)    Hematology consultation completed for this 45yo M with PMH significant for sickle cell anemia, cirrhosis, hemochromatosis, and gout who presented with a low hemoglobin count from outpatient, also reporting worsening for lower back and lower extremity pain similar to prior sickle cell crisi. Patient has known sickle cell disease, and found to have a critically low hemoglobin level of 5.3 g/dL in office with primary hematologist Dr Jordan of [facility], requiring immediate intervention to prevent further complications. He was previously treated with Oxbryta which has been held, as well as with Jadenu ([email or web address]) to manage iron overload which he should still continue  Admitted with Central line associated bacteremia      PAST MEDICAL & SURGICAL HISTORY:  Sickle cell anemia      Gout      History of cholecystectomy          FAMILY HISTORY:  Family history of sickle cell trait (Father, Mother)    Patient's father is  (Father)    Patient's mother is  (Mother)        Alochol: Denied  Smoking: Nonsmoker  Drug Use: Denied  Marital Status:         Allergies    ceftriaxone (Anaphylaxis)  piperacillin-tazobactam (Urticaria)  penicillin (Pruritus)  hydroxyurea (Other)    Intolerances        MEDICATIONS  (STANDING):  allopurinol 50 milliGRAM(s) Oral daily  chlorhexidine 2% Cloths 1 Application(s) Topical <User Schedule>  folic acid 1 milliGRAM(s) Oral every 24 hours  HYDROmorphone PCA (1 mG/mL) 30 milliLiter(s) PCA Continuous PCA Continuous  pantoprazole    Tablet 40 milliGRAM(s) Oral before breakfast  polyethylene glycol 3350 17 Gram(s) Oral daily  senna 2 Tablet(s) Oral at bedtime  sodium chloride 0.45%. 1000 milliLiter(s) (100 mL/Hr) IV Continuous <Continuous>  vancomycin  IVPB 1000 milliGRAM(s) IV Intermittent every 12 hours    MEDICATIONS  (PRN):  acetaminophen     Tablet .. 650 milliGRAM(s) Oral every 6 hours PRN Temp greater or equal to 38C (100.4F), Mild Pain (1 - 3)  diphenhydrAMINE 25 milliGRAM(s) Oral every 6 hours PRN Rash and/or Itching  melatonin 3 milliGRAM(s) Oral at bedtime PRN Insomnia  naloxone Injectable 0.1 milliGRAM(s) IV Push every 3 minutes PRN For ANY of the following changes in patient status:  A. RR LESS THAN 10 breaths per minute, B. Oxygen saturation LESS THAN 90%, C. Sedation score of 6  ondansetron Injectable 4 milliGRAM(s) IV Push every 6 hours PRN Nausea      ROS  No fever, sweats, chills  No epistaxis, HA, sore throat  No CP, SOB, cough, sputum  No n/v/d, abd pain, melena, hematochezia  No edema  No rash  No anxiety  No back pain, joint pain  No bleeding, bruising  No dysuria, hematuria    T(C): 36.6 (10-31-24 @ 09:00), Max: 36.7 (10-30-24 @ 18:11)  HR: 84 (10-31-24 @ 09:00) (81 - 99)  BP: 169/89 (10-31-24 @ 09:00) (157/88 - 169/89)  RR: 18 (10-31-24 @ 09:00) (18 - 18)  SpO2: 99% (10-31-24 @ 09:00) (94% - 99%)  Wt(kg): --    PE  NAD  Awake, alert  Anicteric, MMM  RRR  CTAB  Abd soft, NT, ND  No c/c/e  No rash grossly  FROM                          6.0    8.84  )-----------( 202      ( 31 Oct 2024 06:50 )             17.0       10-31    134[L]  |  104  |  32[H]  ----------------------------<  126[H]  4.5   |  19[L]  |  1.36[H]    Ca    8.7      31 Oct 2024 06:50  Phos  3.4     10-31  Mg     1.50     10-31    TPro  6.7  /  Alb  3.4  /  TBili  4.7[H]  /  DBili  x   /  AST  137[H]  /  ALT  36  /  AlkPhos  124[H]  10-31

## 2024-10-31 NOTE — PROGRESS NOTE ADULT - PROBLEM SELECTOR PLAN 1
- patient with Mediport for over 10 years, has good outpatient followup never had infection from port before   - Bcx from 10/25 with MRSA in anaerobic and aerobic bottles   - currently on vancomycin 1g q12 hrs  - ID following, attempting to salvage port line however if MRSA remain positive will likely need to have port removed, as would be considered CLABSI (port placed prior to admission)   - repeat BCx from 10/27, 1 set negative x24hrs; spoke with ID attending no need for additional set on BCx   - BCx NGTD x 72 hrs   - ID recommending 2 weeks abx from negative Bcx on 10/27, so will need antibiotcs until 11/10  - has port can be used for antibiotics   - monitor for fevers or any signs of systemic infection, if so will need to have port removed acutely ( patient aware and amenable if necessary)

## 2024-10-31 NOTE — PROGRESS NOTE ADULT - ASSESSMENT
Overall, CLABSI, CoNS  - Hold Vanco, dose by level (redose 1g when <15); check random AM labs tomorrow (and check Cr tomorrow),  - Will attempt to salvage catheter--if any signs of systemic infection, or inability to clear, or signs treatment failure, would have to be removed acutely  - F/U BCXs to ensure clear  - Monitor for alternate sources infection  - If BCXs clear, anticipate plan will be for Vanco x 2 weeks from negative BCX; check CBC, BUN, Cr, LFTs, Vanco level weekly, send to [email or web address]--final dose/plan depending on current concern for MATA and vanco accumulation  - Trend Cr--MATA?    My colleagues will be covering this patient starting on 11/1/24. Please call [phone] or on call fellow with any questions or change in status.       Data/Rationale:  45 yo M with PMH significant for sickle cell anemia, cirrhosis, hemochromatosis, and gout who presented with a low hemoglobin count from outpatient, also reporting worsening for lower back and lower extremity pain similar to prior sickle cell crises  No fever, has leukocytosis  Medport in place ~10 years, no exterior signs infection  BCXs 4/4 CoNS  Elevated LFTs in setting cirrhosis/hemochromatosis  CT no PE, bilateral opacities, atelectasis, LAD  No other cardiac hardware  Never had infection of catheter prior  Would presume this is true CLABSI, but would attempt to salvage line  Note new rise in CrCl--adjust vanco and monitor levels, monitor Cr    Brandon Esparza MD  Contact on TEAMS messaging from 9am - 5pm  From 5pm-9am, on weekends, or if no response call [phone]

## 2024-10-31 NOTE — PROGRESS NOTE ADULT - SUBJECTIVE AND OBJECTIVE BOX
CC: F/U for Port Infection    Saw/spoke to patient. No new complaints. Generally well appearing.    Allergies  ceftriaxone (Anaphylaxis)  piperacillin-tazobactam (Urticaria)  penicillin (Pruritus)  hydroxyurea (Other)    ANTIMICROBIALS:  vancomycin  IVPB 1000 every 12 hours    PE:    Vital Signs Last 24 Hrs  T(C): 36.6 (31 Oct 2024 09:00), Max: 36.7 (30 Oct 2024 18:11)  T(F): 97.8 (31 Oct 2024 09:00), Max: 98.1 (30 Oct 2024 18:11)  HR: 84 (31 Oct 2024 09:00) (81 - 99)  BP: 169/89 (31 Oct 2024 09:00) (157/88 - 169/89)  RR: 18 (31 Oct 2024 09:00) (18 - 18)  SpO2: 99% (31 Oct 2024 09:00) (94% - 99%)    Gen: AOx3, NAD, non-toxic  CV: Nontachycardic  Resp: Breathing comfortably, RA  Abd: Soft, nontender  IV/Skin: No thrombophlebitis    LABS:                        6.0    8.84  )-----------( 202      ( 31 Oct 2024 06:50 )             17.0     10-31    134[L]  |  104  |  32[H]  ----------------------------<  126[H]  4.5   |  19[L]  |  1.36[H]    Ca    8.7      31 Oct 2024 06:50  Phos  3.4     10-31  Mg     1.50     10-31    TPro  6.7  /  Alb  3.4  /  TBili  4.7[H]  /  DBili  x   /  AST  137[H]  /  ALT  36  /  AlkPhos  124[H]  10-31    Urinalysis Basic - ( 31 Oct 2024 06:50 )    Color: x / Appearance: x / SG: x / pH: x  Gluc: 126 mg/dL / Ketone: x  / Bili: x / Urobili: x   Blood: x / Protein: x / Nitrite: x   Leuk Esterase: x / RBC: x / WBC x   Sq Epi: x / Non Sq Epi: x / Bacteria: x    MICROBIOLOGY:    .Blood BLOOD  10-27-24   No growth at 72 Hours  --  --    .Blood BLOOD  10-25-24   Growth in aerobic and anaerobic bottles: Staphylococcus epidermidis  Direct identification is available within approximately 3-5  hours either by Blood Panel Multiplexed PCR or Direct  MALDI-TOF. Details: [email or web address]  --  Blood Culture PCR  Staphylococcus epidermidis    CMVPCR Log: NotDetec Aiq45SC/mL (10-26 @ 06:57)    RADIOLOGY:    10/25 CT      IMPRESSION:  No pulmonary embolus.    Scattered bilateral subsegmental opacities may represent atelectasis.    Stable upper abdominal lymphadenopathy.

## 2024-10-31 NOTE — CONSULT NOTE ADULT - ASSESSMENT
44 year old male with history of sickle cell disease admitted with CLABSI          Sickle cell disease  -undergoing care with Dr Jordan of [facility]  -H&H 6.0/17.0  -chronic hemolysis, Iron overload  -pls limit transfusions, maintain Hgb 5.5  -Continue Jadenu  -follow up Dr Jordan after discharge      Bacteremia  -likely in setting of mediport as source  - Bcx from 10/25 with MRSA in anaerobic and aerobic bottles   - currently on vancomycin 1g q12 hrs  -ID following, if positive cx persist, port to be removed      Meghana Real NP  Hematology/Oncology  [facility]  [phone] (Office)  [phone] (Alt office)  Evenings and weekends please call MD on call or office

## 2024-10-31 NOTE — PROGRESS NOTE ADULT - PROBLEM SELECTOR PLAN 2
- Hb 5.2 on presentation, baseline Hb 6-6.5  - s/p 1U PRBC in ED – patient’s outpatient physician had recommended 2U PRBC, may offer additional transfusion based on repeat labs  - s/p 2 units of pRBCs on 10/25 and 10/29  - caution with excessive transfusion in setting of hemochromatosis – c/w home chelating agents  - reached out to outpatient heme providers, states that goal Hgb 6-6.5, get sent in when Hgb approaches 5  - trend CBC, retic count, LDH, ferritin  - hyperbilirubinemia noted, attributable to hemolysis – trend  - c/w hypotonic IVF  - c/w incentive spirometry  - analgesia with hydromorphone PCA: demand dose 0.5mg, lockout 6 minutes, continuous rate 0mg/hr, 4h limit of 20mg  - po diphenhydramine – no indication for IV

## 2024-11-01 LAB
ALBUMIN SERPL ELPH-MCNC: 3.6 G/DL — SIGNIFICANT CHANGE UP (ref 3.3–5)
ALP SERPL-CCNC: 128 U/L — HIGH (ref 40–120)
ALT FLD-CCNC: 38 U/L — SIGNIFICANT CHANGE UP (ref 4–41)
ANION GAP SERPL CALC-SCNC: 11 MMOL/L — SIGNIFICANT CHANGE UP (ref 7–14)
AST SERPL-CCNC: 119 U/L — HIGH (ref 4–40)
BILIRUB SERPL-MCNC: 4.4 MG/DL — HIGH (ref 0.2–1.2)
BUN SERPL-MCNC: 34 MG/DL — HIGH (ref 7–23)
CALCIUM SERPL-MCNC: 9 MG/DL — SIGNIFICANT CHANGE UP (ref 8.4–10.5)
CHLORIDE SERPL-SCNC: 104 MMOL/L — SIGNIFICANT CHANGE UP (ref 98–107)
CO2 SERPL-SCNC: 20 MMOL/L — LOW (ref 22–31)
CREAT SERPL-MCNC: 1.75 MG/DL — HIGH (ref 0.5–1.3)
CULTURE RESULTS: SIGNIFICANT CHANGE UP
EGFR: 48 ML/MIN/1.73M2 — LOW
GLUCOSE SERPL-MCNC: 97 MG/DL — SIGNIFICANT CHANGE UP (ref 70–99)
HCT VFR BLD CALC: 15.9 % — CRITICAL LOW (ref 39–50)
HGB BLD-MCNC: 5.8 G/DL — CRITICAL LOW (ref 13–17)
MAGNESIUM SERPL-MCNC: 1.5 MG/DL — LOW (ref 1.6–2.6)
MCHC RBC-ENTMCNC: 29.9 PG — SIGNIFICANT CHANGE UP (ref 27–34)
MCHC RBC-ENTMCNC: 36.5 G/DL — HIGH (ref 32–36)
MCV RBC AUTO: 82 FL — SIGNIFICANT CHANGE UP (ref 80–100)
NRBC # BLD: 0 /100 WBCS — SIGNIFICANT CHANGE UP (ref 0–0)
NRBC # FLD: 0.02 K/UL — HIGH (ref 0–0)
PHOSPHATE SERPL-MCNC: 3.2 MG/DL — SIGNIFICANT CHANGE UP (ref 2.5–4.5)
PHOSPHATE SERPL-MCNC: 3.5 MG/DL — SIGNIFICANT CHANGE UP (ref 2.5–4.5)
PLATELET # BLD AUTO: 258 K/UL — SIGNIFICANT CHANGE UP (ref 150–400)
POTASSIUM SERPL-MCNC: 4.4 MMOL/L — SIGNIFICANT CHANGE UP (ref 3.5–5.3)
POTASSIUM SERPL-SCNC: 4.4 MMOL/L — SIGNIFICANT CHANGE UP (ref 3.5–5.3)
PROT SERPL-MCNC: 6.8 G/DL — SIGNIFICANT CHANGE UP (ref 6–8.3)
RBC # BLD: 1.94 M/UL — LOW (ref 4.2–5.8)
RBC # FLD: 22.3 % — HIGH (ref 10.3–14.5)
SODIUM SERPL-SCNC: 135 MMOL/L — SIGNIFICANT CHANGE UP (ref 135–145)
SPECIMEN SOURCE: SIGNIFICANT CHANGE UP
VANCOMYCIN FLD-MCNC: 23.5 UG/ML — SIGNIFICANT CHANGE UP
WBC # BLD: 9.11 K/UL — SIGNIFICANT CHANGE UP (ref 3.8–10.5)
WBC # FLD AUTO: 9.11 K/UL — SIGNIFICANT CHANGE UP (ref 3.8–10.5)

## 2024-11-01 PROCEDURE — 99233 SBSQ HOSP IP/OBS HIGH 50: CPT

## 2024-11-01 PROCEDURE — 99232 SBSQ HOSP IP/OBS MODERATE 35: CPT

## 2024-11-01 RX ORDER — MAGNESIUM SULFATE IN 0.9% NACL 2 G/50 ML
1 INTRAVENOUS SOLUTION, PIGGYBACK (ML) INTRAVENOUS ONCE
Refills: 0 | Status: COMPLETED | OUTPATIENT
Start: 2024-11-01 | End: 2024-11-01

## 2024-11-01 RX ADMIN — FOLIC ACID 1 MILLIGRAM(S): 1 TABLET ORAL at 15:16

## 2024-11-01 RX ADMIN — Medication 50 MILLIGRAM(S): at 15:16

## 2024-11-01 RX ADMIN — Medication 30 MILLILITER(S): at 19:25

## 2024-11-01 RX ADMIN — CHLORHEXIDINE GLUCONATE 1 APPLICATION(S): 40 SOLUTION TOPICAL at 06:20

## 2024-11-01 RX ADMIN — POLYETHYLENE GLYCOL 3350 17 GRAM(S): 17 POWDER, FOR SOLUTION ORAL at 15:15

## 2024-11-01 RX ADMIN — ONDANSETRON HYDROCHLORIDE 4 MILLIGRAM(S): 2 INJECTION, SOLUTION INTRAMUSCULAR; INTRAVENOUS at 12:35

## 2024-11-01 RX ADMIN — PANTOPRAZOLE SODIUM 40 MILLIGRAM(S): 40 TABLET, DELAYED RELEASE ORAL at 06:23

## 2024-11-01 RX ADMIN — Medication 650 MILLIGRAM(S): at 00:50

## 2024-11-01 RX ADMIN — Medication 2 TABLET(S): at 21:41

## 2024-11-01 RX ADMIN — Medication 100 GRAM(S): at 19:27

## 2024-11-01 RX ADMIN — Medication 650 MILLIGRAM(S): at 15:38

## 2024-11-01 RX ADMIN — Medication 100 MILLILITER(S): at 15:42

## 2024-11-01 RX ADMIN — Medication 120 MILLILITER(S): at 19:25

## 2024-11-01 RX ADMIN — Medication 30 MILLILITER(S): at 07:42

## 2024-11-01 RX ADMIN — Medication 650 MILLIGRAM(S): at 01:50

## 2024-11-01 RX ADMIN — Medication 30 MILLILITER(S): at 21:37

## 2024-11-01 RX ADMIN — Medication 650 MILLIGRAM(S): at 16:08

## 2024-11-01 RX ADMIN — Medication 100 MILLILITER(S): at 06:25

## 2024-11-01 NOTE — DIETITIAN INITIAL EVALUATION ADULT - PROBLEM SELECTOR PLAN 2
- in setting of atelectasis as seen on CTA chest  - no evidence of acute chest syndrome  - incentive spirometry  - wean O2 as tolerated  - leukocytosis noted, unlikely active infection given chest imaging and absence of other symptoms - likely reactive, defer abx

## 2024-11-01 NOTE — PROVIDER CONTACT NOTE (CRITICAL VALUE NOTIFICATION) - ACTION/TREATMENT ORDERED:
Provider aware states "pt is a sickle cell pt- this is his baseline for both values." After checking with attending, ACP provider states "no interventions needed at this time. Continue to monitor."

## 2024-11-01 NOTE — DIETITIAN INITIAL EVALUATION ADULT - PERTINENT LABORATORY DATA
10-31    134[L]  |  104  |  32[H]  ----------------------------<  126[H]  4.5   |  19[L]  |  1.36[H]    Ca    8.7      31 Oct 2024 06:50  Phos  3.5     11-01  Mg     1.50     10-31    TPro  6.7  /  Alb  3.4  /  TBili  4.7[H]  /  DBili  x   /  AST  137[H]  /  ALT  36  /  AlkPhos  124[H]  10-31

## 2024-11-01 NOTE — PROGRESS NOTE ADULT - PROBLEM SELECTOR PLAN 1
- patient with Mediport for over 10 years, has good outpatient followup never had infection from port before   - Bcx from 10/25 with MRSA in anaerobic and aerobic bottles   - currently on vancomycin 1g q12 hrs  - ID following, attempting to salvage port line however if MRSA remain positive will likely need to have port removed, as would be considered CLABSI (port placed prior to admission)   - repeat BCx from 10/27, 1 set negative x24hrs; spoke with ID attending no need for additional set on BCx   - BCx NGTD x 72 hrs   - ID recommending 2 weeks abx from negative Bcx on 10/27, so will need antibiotics until 11/10  - has port can be used for antibiotics   - monitor for fevers or any signs of systemic infection, if so will need to have port removed acutely (patient aware and amenable if necessary)

## 2024-11-01 NOTE — DIETITIAN INITIAL EVALUATION ADULT - OTHER INFO
Per chart review, Patient is a 43yo M with PMH significant for sickle cell anemia, cirrhosis, hemochromatosis, and gout who presented with a low hemoglobin count, admitted with sickle cell crisis.     Patient reports his appetite remains good in hospital. Currently on regular diet. IV hydration provided in hospital. Denies chewing and swallowing difficulties. Denies any GI distress (nausea/vomiting/diarrhea/constipation.) Last BM today as per pt. Bowel regimen ordered.  Pt reported UBW fluctuates from 195-200lbs. Most recent adm weight 190lbs. Weight trend reflects 5lbs weight loss. Pt noted with Lasix and diuretics in use which can cause weight loss. Offered nutrition education for diet on liver cirrhosis. Pt declined nutrition education at this time. RD to remain available for further nutritional interventions as indicated.

## 2024-11-01 NOTE — PROGRESS NOTE ADULT - PROBLEM SELECTOR PLAN 5
- in setting of acute sickle cell crisis  - c/w hypotonic IVF  - Cr increased 1.36 this AM, will continue with IVFs   - pending CMP this AM

## 2024-11-01 NOTE — DIETITIAN INITIAL EVALUATION ADULT - ORAL INTAKE PTA/DIET HISTORY
Patient reports his apptite has been always good in general, consumes 2-3 meals. Denies any diet followed PTA.

## 2024-11-01 NOTE — PROGRESS NOTE ADULT - SUBJECTIVE AND OBJECTIVE BOX
Follow Up:      Interval History/ROS:Patient is a 44y old  Male who presents with a chief complaint of sickle cell crisis (31 Oct 2024 13:57)      REVIEW OF SYSTEMS  [  ] ROS unobtainable because:    [ x ] All other systems negative except as noted below    Constitutional:  [ ] fever [ ] chills  [ ] weight loss  [ ]night sweat  [ ]poor appetite/PO intake [ ]fatigue   Skin:  [ ] rash [ ] phlebitis	  Eyes: [ ] icterus [ ] pain  [ ] discharge	  ENMT: [ ] sore throat  [ ] thrush [ ] ulcers [ ] exudates [ ]anosmia  Respiratory: [ ] dyspnea [ ] hemoptysis [ ] cough [ ] sputum	  Cardiovascular:  [ ] chest pain [ ] palpitations [ ] edema	  Gastrointestinal:  [ ] nausea [ ] vomiting [ ] diarrhea [ ] constipation [ ] pain	  Genitourinary:  [ ] dysuria [ ] frequency [ ] hematuria [ ] discharge [ ] flank pain  [ ] incontinence  Musculoskeletal:  [ ] myalgias [ ] arthralgias [ ] arthritis  [ ] back pain  Neurological:  [ ] headache [ ] weakness [ ] seizures  [ ] confusion/altered mental status    Allergies  ceftriaxone (Anaphylaxis)  piperacillin-tazobactam (Urticaria)  penicillin (Pruritus)  hydroxyurea (Other)        ANTIMICROBIALS:          OTHER MEDS: MEDICATIONS  (STANDING):  acetaminophen     Tablet .. 650 every 6 hours PRN  allopurinol 50 daily  diphenhydrAMINE 25 every 6 hours PRN  HYDROmorphone PCA (1 mG/mL) 30 PCA Continuous  melatonin 3 at bedtime PRN  ondansetron Injectable 4 every 6 hours PRN  pantoprazole    Tablet 40 before breakfast  polyethylene glycol 3350 17 daily  senna 2 at bedtime      Vital Signs Last 24 Hrs  T(F): 98 (11-01-24 @ 09:05), Max: 98.6 (10-28-24 @ 18:14)    Vital Signs Last 24 Hrs  HR: 80 (11-01-24 @ 09:05) (80 - 94)  BP: 151/81 (11-01-24 @ 09:05) (150/81 - 165/89)  RR: 18 (11-01-24 @ 09:05)  SpO2: 95% (11-01-24 @ 09:05) (95% - 99%)  Wt(kg): --    EXAM:    GA: NAD  HEENT: oral cavity no lesion  CV: nl S1/S2, no RMG  Lungs: CTAB, no distress  Abd: BS+, soft, nontender, no rebounding pain  Ext: no edema  Neuro: No focal deficits  Skin: Intact  IV: no phlebitis    Labs:                        6.0    8.84  )-----------( 202      ( 31 Oct 2024 06:50 )             17.0     10-31    134[L]  |  104  |  32[H]  ----------------------------<  126[H]  4.5   |  19[L]  |  1.36[H]    Ca    8.7      31 Oct 2024 06:50  Phos  3.5     11-01  Mg     1.50     10-31    TPro  6.7  /  Alb  3.4  /  TBili  4.7[H]  /  DBili  x   /  AST  137[H]  /  ALT  36  /  AlkPhos  124[H]  10-31      WBC Trend:  WBC Count: 8.84 (10-31-24 @ 06:50)  WBC Count: 8.12 (10-30-24 @ 19:07)  WBC Count: 7.35 (10-29-24 @ 06:00)  WBC Count: 8.33 (10-28-24 @ 06:55)      Creatine Trend:  Creatinine: 1.36 (10-31)  Creatinine: 0.84 (10-30)  Creatinine: 0.86 (10-29)  Creatinine: 0.98 (10-28)      Liver Biochemical Testing Trend:  Alanine Aminotransferase (ALT/SGPT): 36 (10-31)  Alanine Aminotransferase (ALT/SGPT): 34 (10-30)  Alanine Aminotransferase (ALT/SGPT): 34 (10-29)  Alanine Aminotransferase (ALT/SGPT): 34 (10-28)  Alanine Aminotransferase (ALT/SGPT): 40 (10-27)  Aspartate Aminotransferase (AST/SGOT): 137 (10-31-24 @ 06:50)  Aspartate Aminotransferase (AST/SGOT): 153 (10-30-24 @ 19:12)  Aspartate Aminotransferase (AST/SGOT): 111 (10-29-24 @ 06:00)  Aspartate Aminotransferase (AST/SGOT): 97 (10-28-24 @ 06:55)  Aspartate Aminotransferase (AST/SGOT): 104 (10-27-24 @ 06:38)  Bilirubin Total: 4.7 (10-31)  Bilirubin Total: 5.5 (10-30)  Bilirubin Total: 5.7 (10-29)  Bilirubin Total: 5.1 (10-28)  Bilirubin Total: 5.7 (10-27)      Trend LDH  10-28-24 @ 06:55  1191[H]  10-27-24 @ 06:38  1287[H]  10-24-24 @ 23:48  1492[H]  08-21-24 @ 04:30  1202[H]  08-20-24 @ 08:00  1202[H]      Urinalysis Basic - ( 31 Oct 2024 06:50 )    Color: x / Appearance: x / SG: x / pH: x  Gluc: 126 mg/dL / Ketone: x  / Bili: x / Urobili: x   Blood: x / Protein: x / Nitrite: x   Leuk Esterase: x / RBC: x / WBC x   Sq Epi: x / Non Sq Epi: x / Bacteria: x        MICROBIOLOGY:  Vancomycin Level, Random: 23.5 (11-01 @ 05:51)  Vancomycin Level, Trough: 15.6 (10-29 @ 06:00)  Vancomycin Level, Trough: 35.7 (10-28 @ 06:55)  Vancomycin Level, Trough: 14.3 (10-27 @ 16:28)    MRSA PCR Result.: NotDetec (10-29-24 @ 05:59)  MRSA PCR Result.: NotDetec (08-16-24 @ 07:00)      Culture - Blood (collected 27 Oct 2024 15:50)  Source: .Blood BLOOD  Preliminary Report:    No growth at 4 days    Culture - Blood (collected 25 Oct 2024 01:23)  Source: .Blood BLOOD  Final Report:    Growth in aerobic and anaerobic bottles: Staphylococcus epidermidis    "Susceptibilities not performed"    Culture - Blood (collected 25 Oct 2024 01:23)  Source: .Blood BLOOD  Final Report:    Growth in aerobic and anaerobic bottles: Staphylococcus epidermidis    Direct identification is available within approximately 3-5    hours either by Blood Panel Multiplexed PCR or Direct    MALDI-TOF. Details: [email or web address]  Organism: Blood Culture PCR  Staphylococcus epidermidis  Organism: Staphylococcus epidermidis    Sensitivities:      Method Type: TAMMY      -  Clindamycin: S 0.5      -  Erythromycin: S <=0.25      -  Gentamicin: S <=1 Should not be used as monotherapy      -  Oxacillin: S <=0.25      -  Rifampin: S <=1 Should not be used as monotherapy      -  Tetracycline: S <=1      -  Trimethoprim/Sulfamethoxazole: S <=0.5/9.5      -  Vancomycin: S 2  Organism: Blood Culture PCR    Sensitivities:      Method Type: PCR      -  Staphylococcus epidermidis: Detec    Urinalysis with Rflx Culture (collected 11 Jul 2024 09:29)    Culture - Blood (collected 20 Mar 2024 07:15)  Source: .Blood Blood-Peripheral  Final Report:    No growth at 5 days    Culture - Blood (collected 18 Mar 2024 14:10)  Source: .Blood Blood  Final Report:    Growth in aerobic and anaerobic bottles: Staphylococcus epidermidis    Isolation of Coagulase negative Staphylococcus from single blood culture    sets may represent    contamination. Contact the Microbiology Department at [phone] if    susceptibilitytesting is    clinically indicated.    Direct identification is available within approximately 3-5    hours either by Blood Panel Multiplexed PCR or Direct    MALDI-TOF. Details: [email or web address]  Organism: Blood Culture PCR  Organism: Blood Culture PCR    Sensitivities:      Method Type: PCR      -  Staphylococcus epidermidis: Detec    Culture - Urine (collected 18 Oct 2023 04:10)  Source: Clean Catch Clean Catch (Midstream)  Final Report:    <10,000 CFU/mL Normal Urogenital Nat    Culture - Blood (collected 18 Oct 2023 00:56)  Source: .Blood Blood-Peripheral  Final Report:    No growth at 5 days    Culture - Blood (collected 18 Oct 2023 00:56)  Source: .Blood Blood-Peripheral  Final Report:    No growth at 5 days            Cytomegalovirus By PCR: NotDetec IU/mL (10-26-24 @ 06:57)                        Ferritin: 3189 (10-28)  Ferritin: 3462 (10-27)      Lactate Dehydrogenase, Serum: 1191 (10-28)  Lactate Dehydrogenase, Serum: 1287 (10-27)              RADIOLOGY:  imaging below personally reviewed                     Follow Up:      Interval History/ROS:Patient is a 44y old  Male who presents with a chief complaint of sickle cell crisis (31 Oct 2024 13:57)  ID following for CoNS bacteremia in the setting of Mediport.  Seen at bedside, no new complaints, afebrile, repeat blood Cx NGTD.      Allergies  ceftriaxone (Anaphylaxis)  piperacillin-tazobactam (Urticaria)  penicillin (Pruritus)  hydroxyurea (Other)        ANTIMICROBIALS:          OTHER MEDS: MEDICATIONS  (STANDING):  acetaminophen     Tablet .. 650 every 6 hours PRN  allopurinol 50 daily  diphenhydrAMINE 25 every 6 hours PRN  HYDROmorphone PCA (1 mG/mL) 30 PCA Continuous  melatonin 3 at bedtime PRN  ondansetron Injectable 4 every 6 hours PRN  pantoprazole    Tablet 40 before breakfast  polyethylene glycol 3350 17 daily  senna 2 at bedtime      Vital Signs Last 24 Hrs  T(F): 98 (11-01-24 @ 09:05), Max: 98.6 (10-28-24 @ 18:14)    Vital Signs Last 24 Hrs  HR: 80 (11-01-24 @ 09:05) (80 - 94)  BP: 151/81 (11-01-24 @ 09:05) (150/81 - 165/89)  RR: 18 (11-01-24 @ 09:05)  SpO2: 95% (11-01-24 @ 09:05) (95% - 99%)  Wt(kg): --    EXAM:    GA: NAD  HEENT: oral cavity no lesion  CV: nl S1/S2, no RMG  Lungs: CTAB, no distress  Abd: soft, nontender  Ext: no edema  Neuro: No focal deficits  Skin: Intact  IV: R chest wall mediport with no clinical evidence of infection     Labs:                        6.0    8.84  )-----------( 202      ( 31 Oct 2024 06:50 )             17.0     10-31    134[L]  |  104  |  32[H]  ----------------------------<  126[H]  4.5   |  19[L]  |  1.36[H]    Ca    8.7      31 Oct 2024 06:50  Phos  3.5     11-01  Mg     1.50     10-31    TPro  6.7  /  Alb  3.4  /  TBili  4.7[H]  /  DBili  x   /  AST  137[H]  /  ALT  36  /  AlkPhos  124[H]  10-31      WBC Trend:  WBC Count: 8.84 (10-31-24 @ 06:50)  WBC Count: 8.12 (10-30-24 @ 19:07)  WBC Count: 7.35 (10-29-24 @ 06:00)  WBC Count: 8.33 (10-28-24 @ 06:55)      Creatine Trend:  Creatinine: 1.36 (10-31)  Creatinine: 0.84 (10-30)  Creatinine: 0.86 (10-29)  Creatinine: 0.98 (10-28)      Liver Biochemical Testing Trend:  Alanine Aminotransferase (ALT/SGPT): 36 (10-31)  Alanine Aminotransferase (ALT/SGPT): 34 (10-30)  Alanine Aminotransferase (ALT/SGPT): 34 (10-29)  Alanine Aminotransferase (ALT/SGPT): 34 (10-28)  Alanine Aminotransferase (ALT/SGPT): 40 (10-27)  Aspartate Aminotransferase (AST/SGOT): 137 (10-31-24 @ 06:50)  Aspartate Aminotransferase (AST/SGOT): 153 (10-30-24 @ 19:12)  Aspartate Aminotransferase (AST/SGOT): 111 (10-29-24 @ 06:00)  Aspartate Aminotransferase (AST/SGOT): 97 (10-28-24 @ 06:55)  Aspartate Aminotransferase (AST/SGOT): 104 (10-27-24 @ 06:38)  Bilirubin Total: 4.7 (10-31)  Bilirubin Total: 5.5 (10-30)  Bilirubin Total: 5.7 (10-29)  Bilirubin Total: 5.1 (10-28)  Bilirubin Total: 5.7 (10-27)      Trend LDH  10-28-24 @ 06:55  1191[H]  10-27-24 @ 06:38  1287[H]  10-24-24 @ 23:48  1492[H]  08-21-24 @ 04:30  1202[H]  08-20-24 @ 08:00  1202[H]      Urinalysis Basic - ( 31 Oct 2024 06:50 )    Color: x / Appearance: x / SG: x / pH: x  Gluc: 126 mg/dL / Ketone: x  / Bili: x / Urobili: x   Blood: x / Protein: x / Nitrite: x   Leuk Esterase: x / RBC: x / WBC x   Sq Epi: x / Non Sq Epi: x / Bacteria: x        MICROBIOLOGY:  Vancomycin Level, Random: 23.5 (11-01 @ 05:51)  Vancomycin Level, Trough: 15.6 (10-29 @ 06:00)  Vancomycin Level, Trough: 35.7 (10-28 @ 06:55)  Vancomycin Level, Trough: 14.3 (10-27 @ 16:28)    MRSA PCR Result.: NotDetec (10-29-24 @ 05:59)  MRSA PCR Result.: NotDetec (08-16-24 @ 07:00)      Culture - Blood (collected 27 Oct 2024 15:50)  Source: .Blood BLOOD  Preliminary Report:    No growth at 4 days    Culture - Blood (collected 25 Oct 2024 01:23)  Source: .Blood BLOOD  Final Report:    Growth in aerobic and anaerobic bottles: Staphylococcus epidermidis    "Susceptibilities not performed"    Culture - Blood (collected 25 Oct 2024 01:23)  Source: .Blood BLOOD  Final Report:    Growth in aerobic and anaerobic bottles: Staphylococcus epidermidis    Direct identification is available within approximately 3-5    hours either by Blood Panel Multiplexed PCR or Direct    MALDI-TOF. Details: [email or web address]  Organism: Blood Culture PCR  Staphylococcus epidermidis  Organism: Staphylococcus epidermidis    Sensitivities:      Method Type: TAMMY      -  Clindamycin: S 0.5      -  Erythromycin: S <=0.25      -  Gentamicin: S <=1 Should not be used as monotherapy      -  Oxacillin: S <=0.25      -  Rifampin: S <=1 Should not be used as monotherapy      -  Tetracycline: S <=1      -  Trimethoprim/Sulfamethoxazole: S <=0.5/9.5      -  Vancomycin: S 2  Organism: Blood Culture PCR    Sensitivities:      Method Type: PCR      -  Staphylococcus epidermidis: Detec    Urinalysis with Rflx Culture (collected 11 Jul 2024 09:29)    Culture - Blood (collected 20 Mar 2024 07:15)  Source: .Blood Blood-Peripheral  Final Report:    No growth at 5 days    Culture - Blood (collected 18 Mar 2024 14:10)  Source: .Blood Blood  Final Report:    Growth in aerobic and anaerobic bottles: Staphylococcus epidermidis    Isolation of Coagulase negative Staphylococcus from single blood culture    sets may represent    contamination. Contact the Microbiology Department at [phone] if    susceptibilitytesting is    clinically indicated.    Direct identification is available within approximately 3-5    hours either by Blood Panel Multiplexed PCR or Direct    MALDI-TOF. Details: [email or web address]  Organism: Blood Culture PCR  Organism: Blood Culture PCR    Sensitivities:      Method Type: PCR      -  Staphylococcus epidermidis: Detec    Culture - Urine (collected 18 Oct 2023 04:10)  Source: Clean Catch Clean Catch (Midstream)  Final Report:    <10,000 CFU/mL Normal Urogenital Nat    Culture - Blood (collected 18 Oct 2023 00:56)  Source: .Blood Blood-Peripheral  Final Report:    No growth at 5 days    Culture - Blood (collected 18 Oct 2023 00:56)  Source: .Blood Blood-Peripheral  Final Report:    No growth at 5 days    Cytomegalovirus By PCR: NotDetec IU/mL (10-26-24 @ 06:57)    Ferritin: 3189 (10-28)  Ferritin: 3462 (10-27)    Lactate Dehydrogenase, Serum: 1191 (10-28)  Lactate Dehydrogenase, Serum: 1287 (10-27)      RADIOLOGY:  imaging below personally reviewed        < from: US Abdomen Doppler (10.25.24 @ 11:39) >  IMPRESSION:  Patent hepatic and portal veins with normal directional flow.    --- End of Report ---    < end of copied text >  < from: CT Abdomen and Pelvis w/ IV Cont (10.25.24 @ 03:11) >    IMPRESSION:  No pulmonary embolus.    Scattered bilateral subsegmental opacities may represent atelectasis.    Stable upper abdominal lymphadenopathy.        --- End of Report ---    < end of copied text >

## 2024-11-01 NOTE — DIETITIAN INITIAL EVALUATION ADULT - PERTINENT MEDS FT
MEDICATIONS  (STANDING):  allopurinol 50 milliGRAM(s) Oral daily  chlorhexidine 2% Cloths 1 Application(s) Topical <User Schedule>  folic acid 1 milliGRAM(s) Oral every 24 hours  HYDROmorphone PCA (1 mG/mL) 30 milliLiter(s) PCA Continuous PCA Continuous  pantoprazole    Tablet 40 milliGRAM(s) Oral before breakfast  polyethylene glycol 3350 17 Gram(s) Oral daily  senna 2 Tablet(s) Oral at bedtime  sodium chloride 0.45%. 1000 milliLiter(s) (100 mL/Hr) IV Continuous <Continuous>    MEDICATIONS  (PRN):  acetaminophen     Tablet .. 650 milliGRAM(s) Oral every 6 hours PRN Temp greater or equal to 38C (100.4F), Mild Pain (1 - 3)  diphenhydrAMINE 25 milliGRAM(s) Oral every 6 hours PRN Rash and/or Itching  melatonin 3 milliGRAM(s) Oral at bedtime PRN Insomnia  naloxone Injectable 0.1 milliGRAM(s) IV Push every 3 minutes PRN For ANY of the following changes in patient status:  A. RR LESS THAN 10 breaths per minute, B. Oxygen saturation LESS THAN 90%, C. Sedation score of 6  ondansetron Injectable 4 milliGRAM(s) IV Push every 6 hours PRN Nausea

## 2024-11-01 NOTE — PROGRESS NOTE ADULT - SUBJECTIVE AND OBJECTIVE BOX
[facility] Division of [facility] Medicine  Abhinav Francoguevara VIGIL  Pager [phone]  Available via MS Teams    SUBJECTIVE / OVERNIGHT EVENTS: No acute events overnight.     ADDITIONAL REVIEW OF SYSTEMS:    MEDICATIONS  (STANDING):  allopurinol 50 milliGRAM(s) Oral daily  chlorhexidine 2% Cloths 1 Application(s) Topical <User Schedule>  folic acid 1 milliGRAM(s) Oral every 24 hours  HYDROmorphone PCA (1 mG/mL) 30 milliLiter(s) PCA Continuous PCA Continuous  pantoprazole    Tablet 40 milliGRAM(s) Oral before breakfast  polyethylene glycol 3350 17 Gram(s) Oral daily  senna 2 Tablet(s) Oral at bedtime  sodium chloride 0.45%. 1000 milliLiter(s) (100 mL/Hr) IV Continuous <Continuous>    MEDICATIONS  (PRN):  acetaminophen     Tablet .. 650 milliGRAM(s) Oral every 6 hours PRN Temp greater or equal to 38C (100.4F), Mild Pain (1 - 3)  diphenhydrAMINE 25 milliGRAM(s) Oral every 6 hours PRN Rash and/or Itching  melatonin 3 milliGRAM(s) Oral at bedtime PRN Insomnia  naloxone Injectable 0.1 milliGRAM(s) IV Push every 3 minutes PRN For ANY of the following changes in patient status:  A. RR LESS THAN 10 breaths per minute, B. Oxygen saturation LESS THAN 90%, C. Sedation score of 6  ondansetron Injectable 4 milliGRAM(s) IV Push every 6 hours PRN Nausea      I&O's Summary    31 Oct 2024 07:01  -  01 Nov 2024 07:00  --------------------------------------------------------  IN: 200 mL / OUT: 300 mL / NET: -100 mL        PHYSICAL EXAM:  Vital Signs Last 24 Hrs  T(C): 36.7 (01 Nov 2024 09:05), Max: 36.7 (31 Oct 2024 13:00)  T(F): 98 (01 Nov 2024 09:05), Max: 98.1 (31 Oct 2024 13:00)  HR: 80 (01 Nov 2024 09:05) (80 - 94)  BP: 151/81 (01 Nov 2024 09:05) (150/81 - 165/89)  BP(mean): --  RR: 18 (01 Nov 2024 09:05) (18 - 18)  SpO2: 95% (01 Nov 2024 09:05) (95% - 99%)    Parameters below as of 01 Nov 2024 05:02    O2 Flow (L/min): 2    CONSTITUTIONAL: NAD, well-developed, well-groomed  RESPIRATORY: Normal respiratory effort; lungs are clear to auscultation bilaterally  CARDIOVASCULAR: Regular rate and rhythm, normal S1 and S2, no murmur/rub/gallop; No lower extremity edema  ABDOMEN: Nontender to palpation, normoactive bowel sounds, no rebound/guarding; No hepatosplenomegaly  MUSCULOSKELETAL:  Normal gait; no clubbing or cyanosis of digits; no joint swelling or tenderness to palpation  PSYCH: A+O to person, place, and time  NEUROLOGY: CN 2-12 are intact and symmetric; no gross sensory deficits   SKIN: No rashes; no palpable lesions    LABS:                        6.0    8.84  )-----------( 202      ( 31 Oct 2024 06:50 )             17.0     10-31    134[L]  |  104  |  32[H]  ----------------------------<  126[H]  4.5   |  19[L]  |  1.36[H]    Ca    8.7      31 Oct 2024 06:50  Phos  3.5     11-01  Mg     1.50     10-31    TPro  6.7  /  Alb  3.4  /  TBili  4.7[H]  /  DBili  x   /  AST  137[H]  /  ALT  36  /  AlkPhos  124[H]  10-31          Urinalysis Basic - ( 31 Oct 2024 06:50 )    Color: x / Appearance: x / SG: x / pH: x  Gluc: 126 mg/dL / Ketone: x  / Bili: x / Urobili: x   Blood: x / Protein: x / Nitrite: x   Leuk Esterase: x / RBC: x / WBC x   Sq Epi: x / Non Sq Epi: x / Bacteria: x

## 2024-11-01 NOTE — DIETITIAN INITIAL EVALUATION ADULT - PROBLEM SELECTOR PLAN 5
-  in setting of cirrhosis and hemochromatosis, as previously known and visualized on CTAP  - will consult hepatology  - trend for now  - will defer diuretics for now given MATA    Some edema noted on b/l LE, will still offer hypotonic IVF but will need to consider hepatorenal etiology.

## 2024-11-01 NOTE — PROGRESS NOTE ADULT - ASSESSMENT
43 yo M with PMH significant for sickle cell anemia, cirrhosis, hemochromatosis, and gout who presented with a low hemoglobin count from outpatient, also reporting worsening for lower back and lower extremity pain similar to prior sickle cell crises  No fever, has leukocytosis  Medport in place ~10 years, no exterior signs infection  BCXs 4/4 CoNS  Elevated LFTs in setting cirrhosis/hemochromatosis  CT no PE, bilateral opacities, atelectasis, LAD  No other cardiac hardware  Never had infection of catheter prior  Would presume this is true CLABSI, but would attempt to salvage line  Note new rise in CrCl--adjust vanco and monitor levels, monitor Cr    #Staph epi bacteremia in the setting of Mediport     Recommendations:   -Vancomycin level this morning 23.5, would continue to hold off further Vancomcyin for now - re-check level in am (will continue Vanc by level)  -Will attempt to salvage catheter--if any signs of systemic infection, or inability to clear, or signs treatment failure, would have to be removed acutely  -F/U repeat BCXs: NGTD  -If BCXs clear, anticipate plan will be for Vanco x 2 weeks from negative BCX; check CBC, BUN, Cr, LFTs, Vanco level weekly, send to [email or web address]--final dose/plan depending on current concern for MATA and vanco accumulation  -Please send CMP in am for kidney function f/up     Discussed with primary team    Cheyenne Lopez MD  ID physician  [employer] Teams Preferred  After 5pm/weekends call [phone]

## 2024-11-02 LAB
ALBUMIN SERPL ELPH-MCNC: 3.6 G/DL — SIGNIFICANT CHANGE UP (ref 3.3–5)
ALP SERPL-CCNC: 140 U/L — HIGH (ref 40–120)
ALT FLD-CCNC: 42 U/L — HIGH (ref 4–41)
ANION GAP SERPL CALC-SCNC: 14 MMOL/L — SIGNIFICANT CHANGE UP (ref 7–14)
AST SERPL-CCNC: 119 U/L — HIGH (ref 4–40)
BILIRUB SERPL-MCNC: 4.4 MG/DL — HIGH (ref 0.2–1.2)
BUN SERPL-MCNC: 35 MG/DL — HIGH (ref 7–23)
CALCIUM SERPL-MCNC: 8.7 MG/DL — SIGNIFICANT CHANGE UP (ref 8.4–10.5)
CHLORIDE SERPL-SCNC: 105 MMOL/L — SIGNIFICANT CHANGE UP (ref 98–107)
CO2 SERPL-SCNC: 16 MMOL/L — LOW (ref 22–31)
CREAT SERPL-MCNC: 2.06 MG/DL — HIGH (ref 0.5–1.3)
EGFR: 40 ML/MIN/1.73M2 — LOW
GLUCOSE SERPL-MCNC: 119 MG/DL — HIGH (ref 70–99)
HCT VFR BLD CALC: 15.4 % — CRITICAL LOW (ref 39–50)
HGB BLD-MCNC: 5.5 G/DL — CRITICAL LOW (ref 13–17)
MAGNESIUM SERPL-MCNC: 1.6 MG/DL — SIGNIFICANT CHANGE UP (ref 1.6–2.6)
MCHC RBC-ENTMCNC: 29.7 PG — SIGNIFICANT CHANGE UP (ref 27–34)
MCHC RBC-ENTMCNC: 35.7 G/DL — SIGNIFICANT CHANGE UP (ref 32–36)
MCV RBC AUTO: 83.2 FL — SIGNIFICANT CHANGE UP (ref 80–100)
NRBC # BLD: 0 /100 WBCS — SIGNIFICANT CHANGE UP (ref 0–0)
NRBC # FLD: 0.03 K/UL — HIGH (ref 0–0)
PHOSPHATE SERPL-MCNC: 3.5 MG/DL — SIGNIFICANT CHANGE UP (ref 2.5–4.5)
PLATELET # BLD AUTO: 272 K/UL — SIGNIFICANT CHANGE UP (ref 150–400)
POTASSIUM SERPL-MCNC: 4.5 MMOL/L — SIGNIFICANT CHANGE UP (ref 3.5–5.3)
POTASSIUM SERPL-SCNC: 4.5 MMOL/L — SIGNIFICANT CHANGE UP (ref 3.5–5.3)
PROT SERPL-MCNC: 6.9 G/DL — SIGNIFICANT CHANGE UP (ref 6–8.3)
RBC # BLD: 1.85 M/UL — LOW (ref 4.2–5.8)
RBC # FLD: 22.6 % — HIGH (ref 10.3–14.5)
SODIUM SERPL-SCNC: 135 MMOL/L — SIGNIFICANT CHANGE UP (ref 135–145)
VANCOMYCIN FLD-MCNC: 10.8 UG/ML — SIGNIFICANT CHANGE UP
WBC # BLD: 10.38 K/UL — SIGNIFICANT CHANGE UP (ref 3.8–10.5)
WBC # FLD AUTO: 10.38 K/UL — SIGNIFICANT CHANGE UP (ref 3.8–10.5)

## 2024-11-02 PROCEDURE — 99233 SBSQ HOSP IP/OBS HIGH 50: CPT

## 2024-11-02 RX ORDER — VANCOMYCIN HYDROCHLORIDE 50 MG/ML
1000 KIT ORAL ONCE
Refills: 0 | Status: DISCONTINUED | OUTPATIENT
Start: 2024-11-02 | End: 2024-11-02

## 2024-11-02 RX ORDER — HYDROMORPHONE HCL/0.9% NACL/PF 6 MG/30 ML
30 PATIENT CONTROLLED ANALGESIA SYRINGE INTRAVENOUS
Refills: 0 | Status: DISCONTINUED | OUTPATIENT
Start: 2024-11-01 | End: 2024-11-07

## 2024-11-02 RX ORDER — VANCOMYCIN HYDROCHLORIDE 50 MG/ML
500 KIT ORAL ONCE
Refills: 0 | Status: COMPLETED | OUTPATIENT
Start: 2024-11-02 | End: 2024-11-02

## 2024-11-02 RX ADMIN — CHLORHEXIDINE GLUCONATE 1 APPLICATION(S): 40 SOLUTION TOPICAL at 13:37

## 2024-11-02 RX ADMIN — Medication 75 MILLILITER(S): at 23:03

## 2024-11-02 RX ADMIN — Medication 650 MILLIGRAM(S): at 00:53

## 2024-11-02 RX ADMIN — Medication 120 MILLILITER(S): at 05:50

## 2024-11-02 RX ADMIN — PANTOPRAZOLE SODIUM 40 MILLIGRAM(S): 40 TABLET, DELAYED RELEASE ORAL at 05:50

## 2024-11-02 RX ADMIN — POLYETHYLENE GLYCOL 3350 17 GRAM(S): 17 POWDER, FOR SOLUTION ORAL at 13:34

## 2024-11-02 RX ADMIN — Medication 50 MILLIGRAM(S): at 13:35

## 2024-11-02 RX ADMIN — Medication 30 MILLILITER(S): at 23:01

## 2024-11-02 RX ADMIN — Medication 650 MILLIGRAM(S): at 01:23

## 2024-11-02 RX ADMIN — Medication 75 MILLILITER(S): at 20:03

## 2024-11-02 RX ADMIN — FOLIC ACID 1 MILLIGRAM(S): 1 TABLET ORAL at 13:35

## 2024-11-02 RX ADMIN — Medication 2 TABLET(S): at 21:51

## 2024-11-02 RX ADMIN — VANCOMYCIN HYDROCHLORIDE 100 MILLIGRAM(S): KIT at 23:48

## 2024-11-02 RX ADMIN — Medication 30 MILLILITER(S): at 20:01

## 2024-11-02 RX ADMIN — Medication 30 MILLILITER(S): at 07:55

## 2024-11-02 NOTE — PROGRESS NOTE ADULT - PROBLEM SELECTOR PLAN 2
- Hb 5.2 on presentation, baseline Hb 6-6.5  - s/p 1U PRBC in ED – patient’s outpatient physician had recommended 2U PRBC, may offer additional transfusion based on repeat labs  - s/p 2 units of pRBCs on 10/25 and 10/29  - caution with excessive transfusion in setting of hemochromatosis – c/w home chelating agents  - reached out to outpatient heme providers, states that goal Hgb 6-6.5, get sent in when Hgb approaches 5  - trend CBC, retic count, LDH, ferritin  - hyperbilirubinemia noted, attributable to hemolysis – trend  - c/w hypotonic IVF  - c/w incentive spirometry  - analgesia with hydromorphone PCA: demand dose 0.5mg, lockout 6 minutes, continuous rate 0mg/hr, 4h limit of 20mg  - po diphenhydramine – no indication for IV  - please obtain labs vis pediatric tubes

## 2024-11-02 NOTE — PROGRESS NOTE ADULT - PROBLEM SELECTOR PLAN 1
- patient with Mediport for over 10 years, has good outpatient followup never had infection from port before   - Bcx from 10/25 with MRSA in anaerobic and aerobic bottles   - currently on vancomycin 1g q12 hrs  - ID following, attempting to salvage port line however if MRSA remain positive will likely need to have port removed, as would be considered CLABSI (port placed prior to admission)   - repeat BCx from 10/27, 1 set negative x24hrs; spoke with ID attending no need for additional set on BCx   - BCx NGTD x 72 hrs   - ID recommending 2 weeks abx from negative Bcx on 10/27, so will need antibiotics until 11/10  - need to touch base with CM in if PORT could be used for IV vanc at home   - has port can be used for antibiotics   - monitor for fevers or any signs of systemic infection, if so will need to have port removed acutely (patient aware and amenable if necessary)

## 2024-11-02 NOTE — PROGRESS NOTE ADULT - SUBJECTIVE AND OBJECTIVE BOX
[facility] Division of Hospital Medicine  ARMINDAto Shai VIGIL  Pager [phone]  Available via MS Teams    SUBJECTIVE / OVERNIGHT EVENTS: No acute events overnight. Patient states that he had back pain and feels more fatigued this AM.     ADDITIONAL REVIEW OF SYSTEMS:    MEDICATIONS  (STANDING):  allopurinol 50 milliGRAM(s) Oral daily  chlorhexidine 2% Cloths 1 Application(s) Topical <User Schedule>  folic acid 1 milliGRAM(s) Oral every 24 hours  HYDROmorphone PCA (1 mG/mL) 30 milliLiter(s) PCA Continuous PCA Continuous  pantoprazole    Tablet 40 milliGRAM(s) Oral before breakfast  polyethylene glycol 3350 17 Gram(s) Oral daily  senna 2 Tablet(s) Oral at bedtime  sodium chloride 0.45%. 1000 milliLiter(s) (75 mL/Hr) IV Continuous <Continuous>    MEDICATIONS  (PRN):  acetaminophen     Tablet .. 650 milliGRAM(s) Oral every 6 hours PRN Temp greater or equal to 38C (100.4F), Mild Pain (1 - 3)  diphenhydrAMINE 25 milliGRAM(s) Oral every 6 hours PRN Rash and/or Itching  melatonin 3 milliGRAM(s) Oral at bedtime PRN Insomnia  naloxone Injectable 0.1 milliGRAM(s) IV Push every 3 minutes PRN For ANY of the following changes in patient status:  A. RR LESS THAN 10 breaths per minute, B. Oxygen saturation LESS THAN 90%, C. Sedation score of 6  ondansetron Injectable 4 milliGRAM(s) IV Push every 6 hours PRN Nausea      I&O's Summary      PHYSICAL EXAM:  Vital Signs Last 24 Hrs  T(C): 36.5 (02 Nov 2024 09:00), Max: 36.8 (01 Nov 2024 17:00)  T(F): 97.7 (02 Nov 2024 09:00), Max: 98.2 (01 Nov 2024 17:00)  HR: 78 (02 Nov 2024 09:00) (74 - 93)  BP: 158/80 (02 Nov 2024 09:00) (142/71 - 158/80)  BP(mean): --  RR: 18 (02 Nov 2024 09:00) (18 - 18)  SpO2: 100% (02 Nov 2024 09:00) (98% - 100%)    Parameters below as of 02 Nov 2024 05:00  Patient On (Oxygen Delivery Method): nasal cannula  O2 Flow (L/min): 2    CONSTITUTIONAL: appears fatigued this AM. + scleral icterus   RESPIRATORY: Normal respiratory effort; lungs are clear to auscultation bilaterally  CARDIOVASCULAR: Regular rate and rhythm, normal S1 and S2, no murmur/rub/gallop; No lower extremity edema  ABDOMEN: Nontender to palpation, normoactive bowel sounds, no rebound/guarding  MUSCULOSKELETAL: no joint swelling or tenderness to palpation  PSYCH: A+O to person, place, and time; affect appropriate  NEUROLOGY: CN 2-12 are intact and symmetric; no gross sensory deficits   SKIN: No rashes; no palpable lesions    LABS:                        5.8    9.11  )-----------( 258      ( 01 Nov 2024 15:33 )             15.9     11-01    135  |  104  |  34[H]  ----------------------------<  97  4.4   |  20[L]  |  1.75[H]    Ca    9.0      01 Nov 2024 15:33  Phos  3.2     11-01  Mg     1.50     11-01    TPro  6.8  /  Alb  3.6  /  TBili  4.4[H]  /  DBili  x   /  AST  119[H]  /  ALT  38  /  AlkPhos  128[H]  11-01          Urinalysis Basic - ( 01 Nov 2024 15:33 )    Color: x / Appearance: x / SG: x / pH: x  Gluc: 97 mg/dL / Ketone: x  / Bili: x / Urobili: x   Blood: x / Protein: x / Nitrite: x   Leuk Esterase: x / RBC: x / WBC x   Sq Epi: x / Non Sq Epi: x / Bacteria: x

## 2024-11-02 NOTE — PROGRESS NOTE ADULT - PROBLEM SELECTOR PLAN 5
- in setting of acute sickle cell crisis  - c/w hypotonic IVF  - Cr increased 1.75, increased fluids 120 cc/hr for 10 hours   - pending CMP this AM   - will need to obtain labs with pediatric tubes

## 2024-11-03 PROCEDURE — 99233 SBSQ HOSP IP/OBS HIGH 50: CPT

## 2024-11-03 RX ORDER — ONDANSETRON HYDROCHLORIDE 2 MG/ML
4 INJECTION, SOLUTION INTRAMUSCULAR; INTRAVENOUS EVERY 6 HOURS
Refills: 0 | Status: DISCONTINUED | OUTPATIENT
Start: 2024-11-03 | End: 2024-11-14

## 2024-11-03 RX ADMIN — Medication 100 MILLILITER(S): at 21:53

## 2024-11-03 RX ADMIN — POLYETHYLENE GLYCOL 3350 17 GRAM(S): 17 POWDER, FOR SOLUTION ORAL at 13:27

## 2024-11-03 RX ADMIN — Medication 650 MILLIGRAM(S): at 04:30

## 2024-11-03 RX ADMIN — ONDANSETRON HYDROCHLORIDE 4 MILLIGRAM(S): 2 INJECTION, SOLUTION INTRAMUSCULAR; INTRAVENOUS at 15:19

## 2024-11-03 RX ADMIN — PANTOPRAZOLE SODIUM 40 MILLIGRAM(S): 40 TABLET, DELAYED RELEASE ORAL at 05:33

## 2024-11-03 RX ADMIN — Medication 2 TABLET(S): at 22:04

## 2024-11-03 RX ADMIN — Medication 30 MILLILITER(S): at 21:50

## 2024-11-03 RX ADMIN — Medication 50 MILLIGRAM(S): at 13:26

## 2024-11-03 RX ADMIN — Medication 100 MILLILITER(S): at 13:51

## 2024-11-03 RX ADMIN — CHLORHEXIDINE GLUCONATE 1 APPLICATION(S): 40 SOLUTION TOPICAL at 13:29

## 2024-11-03 RX ADMIN — Medication 650 MILLIGRAM(S): at 03:36

## 2024-11-03 RX ADMIN — Medication 30 MILLILITER(S): at 19:46

## 2024-11-03 RX ADMIN — ONDANSETRON HYDROCHLORIDE 4 MILLIGRAM(S): 2 INJECTION, SOLUTION INTRAMUSCULAR; INTRAVENOUS at 01:25

## 2024-11-03 RX ADMIN — Medication 75 MILLILITER(S): at 07:23

## 2024-11-03 RX ADMIN — FOLIC ACID 1 MILLIGRAM(S): 1 TABLET ORAL at 13:27

## 2024-11-03 RX ADMIN — Medication 30 MILLILITER(S): at 07:21

## 2024-11-03 NOTE — DISCHARGE NOTE PROVIDER - HOSPITAL COURSE
Hospital Course   - Presented for low Hgb from outpatient heme office for Hgb 5.3. Patient Received total 2 units pRbcs, however Hgb continue to drop. Spoke with outpatient hematologist team Dr. Shirley Jordan states that patient to received prbcs for Hgb <5.5, Course complicated for MRSA bacteremia, was started on vancomycin. Repeat Bcx on 10/27 negative for over 5 days. ID recommending total 2 week course of antibiotics until 11/10. Course further complicated by MATA to 2.07. nephro consulted, states that __________.       MRSA bacteremia.   - patient with Mediport for over 10 years, has good outpatient followup never had infection from port before   - Bcx from 10/25 with MRSA in anaerobic and aerobic bottles   - currently on vancomycin 1g q12 hrs  - ID following, attempting to salvage port line however if MRSA remain positive will likely need to have port removed, as would be considered CLABSI (port placed prior to admission)   - repeat BCx from 10/27, 1 set negative x24hrs; spoke with ID attending no need for additional set on BCx   - BCx NGTD x 72 hrs   - ID recommending 2 weeks abx from negative Bcx on 10/27, so will need antibiotics until 11/10  - need to touch base with CM in if PORT could be used for IV vanc at home   - has port can be used for antibiotics   - monitor for fevers or any signs of systemic infection, if so will need to have port removed acutely (patient aware and amenable if necessary).    Anemia, sickle cell with crisis.   ·  Plan: - Hb 5.2 on presentation, baseline Hb 6-6.5  - s/p 1U PRBC in ED – patient’s outpatient physician had recommended 2U PRBC, may offer additional transfusion based on repeat labs  - s/p 2 units of pRBCs on 10/25 and 10/29  - caution with excessive transfusion in setting of hemochromatosis – c/w home chelating agents  - reached out to outpatient heme providers, states that goal Hgb 6-6.5, get sent in when Hgb approaches 5  - trend CBC, retic count, LDH, ferritin  - hyperbilirubinemia noted, attributable to hemolysis – trend  - c/w hypotonic IVF  - c/w incentive spirometry  - analgesia with hydromorphone PCA: demand dose 0.5mg, lockout 6 minutes, continuous rate 0mg/hr, 4h limit of 20mg  - po diphenhydramine – no indication for IV  - please obtain labs vis pediatric tubes.     Acute respiratory failure with hypoxia.   - in setting of atelectasis as seen on CTA chest. likely sequelae of known prolonged sickle cell dz   - no evidence of acute chest syndrome  - incentive spirometry  - wean O2 as tolerated  - o2 intermittently desats to 88-89%, will need to obtain ambulatory sats to access need for home O2   - leukocytosis noted, on admission, now resolved.         Hospital Course   - Presented for low Hgb from outpatient heme office for Hgb 5.3. Patient Received total 2 units pRbcs, however Hgb continue to drop. Spoke with outpatient hematologist team Dr. Shirley Jordan states that patient to received prbcs for Hgb <5.5, Course complicated for MRSA bacteremia, was started on vancomycin. Repeat Bcx on 10/27 negative for over 5 days. ID recommending total 2 week course of antibiotics until 11/10. Course further complicated by MATA to 2.07. nephro consulted, poss ATN due to infection.  Cr is slowly improving after IVF and bicarb drip      MRSA bacteremia.   - patient with Mediport for over 10 years, has good outpatient followup never had infection from port before   - Bcx from 10/25 with MRSA in anaerobic and aerobic bottles   initially treated with vanco but changed to dapto in the setting of MATA, course completed while inpt  - has port can be used for antibiotics   - monitor for fevers or any signs of systemic infection, if so will need to have port removed acutely (patient aware and amenable if necessary).    Anemia, sickle cell with crisis.   ·  Plan: - Hb 5.2 on presentation, baseline Hb 6-6.5  - s/p 1U PRBC in ED – patient’s outpatient physician had recommended 2U PRBC, may offer additional transfusion based on repeat labs  pt rec'd total 4 units during stay   - caution with excessive transfusion in setting of hemochromatosis – c/w home chelating agents  - reached out to outpatient heme providers, states that goal Hgb 6-6.5, get sent in when Hgb approaches 5  - please obtain labs vis pediatric tubes.     Acute respiratory failure with hypoxia.   - in setting of atelectasis as seen on CTA chest. likely sequelae of known prolonged sickle cell dz   - no evidence of acute chest syndrome  - incentive spirometry  - wean O2 as tolerated  - o2 intermittently desats to 88-89%, will need to obtain ambulatory sats to access need for home O2   - leukocytosis noted, on admission, now resolved.  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 Patient Received total 2 units pRbcs, however Hgb continue to drop. Spoke with outpatient hematologist team Dr. Shirley Jordan states that patient to received prbcs for Hgb <5.5, Course complicated for MRSA bacteremia, was started on vancomycin. Repeat Bcx on 10/27 negative for over 5 days. ID recommending total 2 week course of antibiotics until 11/10. Course further complicated by MATA to 2.07. nephro consulted, poss ATN due to infection.  Cr is slowly improving after IVF and bicarb drip. Pt developed fluid overload - requiring IVF diuresis. Volume status/renal function continued to improve.       MRSA bacteremia.   - patient with Mediport for over 10 years, has good outpatient followup never had infection from port before   - Bcx from 10/25 with MRSA in anaerobic and aerobic bottles   initially treated with vanco but changed to dapto in the setting of MATA, course completed while inpt  - has port can be used for antibiotics   - monitor for fevers or any signs of systemic infection, if so will need to have port removed acutely (patient aware and amenable if necessary).    Anemia, sickle cell with crisis.   ·  Plan: - Hb 5.2 on presentation, baseline Hb 6-6.5  - s/p 1U PRBC in ED – patient’s outpatient physician had recommended 2U PRBC, may offer additional transfusion based on repeat labs  pt rec'd total 4 units during stay   - caution with excessive transfusion in setting of hemochromatosis – c/w home chelating agents  - reached out to outpatient heme providers, states that goal Hgb 6-6.5, get sent in when Hgb approaches 5  - please obtain labs vis pediatric tubes.     Acute respiratory failure with hypoxia.   - in setting of atelectasis as seen on CTA chest. likely sequelae of known prolonged sickle cell dz   - no evidence of acute chest syndrome  - incentive spirometry  - wean O2 as tolerated  - o2 intermittently desats to 88-89%, will need to obtain ambulatory sats to access need for home O2   - leukocytosis noted, on admission, now resolved.  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  During evaluation - pt noted to be hypoxic, hypotensive. Labs w/ leukocytosis. Pt received multiple PRBCs transfusion. He was started on PCA pump for pain management. Workup later revealed MRSA bacteremia. He was started on vancomycin. Labs also notable for MATA/transaminitis. Patient's creatinine trended up over course. Pt seen by nephro. Concerning for ATN in setting of contrast and medication. Abx changed to dapto. Pt achieved clearance of bacteremia and has completed course of abx. POrt in place was not removed and salvaged through completion of abx. Patient later developed fluid overload -> started on IV diuretics. He continued to improved. Pain improved. Med stable for DC home w/ further f/u as OP.      DC Dx:  #Sepsis 2' CLABSI (POA)  #MRSA bacteremia.   #Anemia, sickle cell with crisis.   #Acute respiratory failure with hypoxia 2' VOC   #ATN 2' sepsis, medication (vancomycin)  #Gout flare  #HTN  #Cirrhosis 2' hemochromatosis      Meds - Started on lasix x14d. F/u neph/PCP as OP.

## 2024-11-03 NOTE — DISCHARGE NOTE PROVIDER - PROVIDER TOKENS
PROVIDER:[TOKEN:[[id number]],FOLLOWUP:[1 week]] PROVIDER:[TOKEN:[[id number]],FOLLOWUP:[1 week]],PROVIDER:[TOKEN:[[id number]]]

## 2024-11-03 NOTE — CONSULT NOTE ADULT - SUBJECTIVE AND OBJECTIVE BOX
[facility] DIVISION OF KIDNEY DISEASES AND HYPERTENSION -- [phone]  -- INITIAL CONSULT NOTE  --------------------------------------------------------------------------------  HPI:  43 y/o M with hx of sickle cell anemia, cirrhosis, hemochromatosis and gout who presented with a low hemoglobin. His PCP advised him to be admitted for blood transfusion. Pt also had lower back and lower extremity pain similar to prior sickle cell crisis episodes. Pt had some exertional dyspnea but denies chest pain/ abdominal pain/ HA.   Nephrology was consulted for management of MATA. At the time of admission pt had Scr of 1.55. His baseline is ~0.8. MATA initially improved to his baseline Scr with blood transfusions and IV fluids. Pt got CT with IV contrast on 10/25/24. But Scr remained around his baseline till 10/31. Pt has mediport for recurrent transfusions. He was later found to have MRSA bacteremia. On IV antibiotics. Acute chest syndrome was ruled out. Pt was given Toradol on 10/30 and Scr started to rise 1.36 on 10/31 and worsened to  2.06 on   Pt reports compliance with the medications and follow ups. He smokes Marijuana occasionally, takes alcohol rarely. Denies any NSAIDs as out pt.  Does not use any other recreational drugs. Pt still has abdominal bloating and B/L pedal edema. He denies any dysuria/ hematuria/ change in UOP.       PAST HISTORY  --------------------------------------------------------------------------------  PAST MEDICAL & SURGICAL HISTORY:  Sickle cell anemia      Gout      History of cholecystectomy        FAMILY HISTORY:  Family history of sickle cell trait (Father, Mother)    Patient's father is  (Father)    Patient's mother is  (Mother)      PAST SOCIAL HISTORY:    ALLERGIES & MEDICATIONS  --------------------------------------------------------------------------------  Allergies    ceftriaxone (Anaphylaxis)  piperacillin-tazobactam (Urticaria)  penicillin (Pruritus)  hydroxyurea (Other)    Intolerances      Standing Inpatient Medications  allopurinol 50 milliGRAM(s) Oral daily  chlorhexidine 2% Cloths 1 Application(s) Topical <User Schedule>  folic acid 1 milliGRAM(s) Oral every 24 hours  HYDROmorphone PCA (1 mG/mL) 30 milliLiter(s) PCA Continuous PCA Continuous  pantoprazole    Tablet 40 milliGRAM(s) Oral before breakfast  polyethylene glycol 3350 17 Gram(s) Oral daily  senna 2 Tablet(s) Oral at bedtime  sodium chloride 0.45%. 1000 milliLiter(s) IV Continuous <Continuous>    PRN Inpatient Medications  acetaminophen     Tablet .. 650 milliGRAM(s) Oral every 6 hours PRN  diphenhydrAMINE 25 milliGRAM(s) Oral every 6 hours PRN  melatonin 3 milliGRAM(s) Oral at bedtime PRN  naloxone Injectable 0.1 milliGRAM(s) IV Push every 3 minutes PRN  ondansetron    Tablet 4 milliGRAM(s) Oral every 6 hours PRN  ondansetron Injectable 4 milliGRAM(s) IV Push every 6 hours PRN      REVIEW OF SYSTEMS  --------------------------------------------------------------------------------  Gen: No fevers/chills  Skin: No rashes  Head/Eyes/Ears: Normal hearing  Respiratory: On supplemental oxygen  CV: No chest pain  GI: No abdominal pain, diarrhea  : No dysuria, hematuria  MSK: + B/L edema  Heme: No easy bruising or bleeding  Psych: No significant depression    All other systems were reviewed and are negative, except as noted.    VITALS/PHYSICAL EXAM  --------------------------------------------------------------------------------  T(C): 36.8 (24 @ 18:17), Max: 36.8 (24 @ 09:30)  HR: 92 (24 18:17) (84 - 104)  BP: 157/85 (24 @ 18:17) (142/89 - 157/85)  RR: 18 (24 18:17) (17 - 18)  SpO2: 99% (24 18:17) (96% - 100%)  Wt(kg): --        Physical Exam:  Gen: On supplemental oxygen  HEENT: MMM  Pulm: Lower zone decreased breath sounds   CV: S1S2  Abd: Soft, +BS   Ext: Pitting LE edema B/L  Neuro: Awake  Skin: Warm and dry  Vascular access: Mediport RIJ and peripheral IVs.     LABS/STUDIES  --------------------------------------------------------------------------------              5.5    10.38 >-----------<  272      [24 @ 21:21]              15.4     135  |  105  |  35  ----------------------------<  119      [24 @ 21:21]  4.5   |  16  |  2.06        Ca     8.7     [24 @ 21:21]      Mg     1.60     [24 @ 21:21]      Phos  3.5     [24 @ 21:21]    TPro  6.9  /  Alb  3.6  /  TBili  4.4  /  DBili  x   /  AST  119  /  ALT  42  /  AlkPhos  140  [24 @ 21:21]    Creatinine Trend:  SCr 2.06 [ 21:21]  SCr 1.75 [ 15:33]  SCr 1.36 [10-31 @ 06:50]  SCr 0.84 [10-30 @ 19:12]  SCr 0.86 [10-29 @ 06:00]        Iron 428, TIBC 541, %sat 79      [10-24-24 @ 23:48]  Ferritin 3189      [10-28-24 @ 06:55]  Lipid: chol 115, , HDL 23, LDL --      [23 @ 07:24]    HBsAb 18.1      [24 @ 04:30]  HBsAg Nonreact      [10-26-24 @ 06:57]  HBcAb Nonreact      [24 @ 04:30]  HCV 0.09, Nonreact      [10-26-24 @ 06:57]  HIV Nonreact      [10-13-23 @ 01:00]    OSMANY: titer 1:160, pattern Homogeneous      [24 @ 04:30]

## 2024-11-03 NOTE — CONSULT NOTE ADULT - ATTENDING COMMENTS
MATA in the setting of sickle cell anemia, Pain, NSAIDs use ( contrast use and high Vanco level earlier)   avoid contrast studies, ACE-I, ARB, or NSAIDs  IVF today   further recs per labs MATA in the setting of sickle cell anemia, Pain, NSAIDs use ( contrast use and high Vanco level earlier)   proteinuria   metabolic acidosis   avoid contrast studies, ACE-I, ARB, or NSAIDs  give IVF today   further recs per labs

## 2024-11-03 NOTE — CONSULT NOTE ADULT - ASSESSMENT
43 y/o M with hx of sickle cell disease, cirrhosis presented with anemia and sickle cell crisis - with MATA

## 2024-11-03 NOTE — PROGRESS NOTE ADULT - ASSESSMENT
Patient is a 45yo M with PMH significant for sickle cell anemia (port in place) , cirrhosis, hemochromatosis, and gout who presented with a low hemoglobin count, admitted with sickle cell crisis. Found  to have MRSA bacteremia on vancomycin

## 2024-11-03 NOTE — PROGRESS NOTE ADULT - PROBLEM SELECTOR PLAN 1
At the time of admission pt had Scr of 1.55. His baseline is ~0.8. MATA initially improved to his baseline Scr with blood transfusions and IV fluids. Pt got CT with IV contrast on 10/25/24. But Scr remained around his baseline till 10/31. Pt has mediport for recurrent transfusions. He was later found to have MRSA bacteremia. On IV antibiotics. Acute chest syndrome was ruled out. Pt was given Toradol on 10/30 and Scr started to rise 1.36 on 10/31 and worsened to  2.06 on 11/2. Pt is on IV fluids and IV antibiotics. UA was not done but UPCR showed 3.1 (10/25). MATA in setting of infection related ATN, sickle cell disease and NSAID use.  We recommend to obtain UA, US- kidney and bladder. Repeat Urine electrolytes and UPCR.  Monitor I/Os and labs. Avoid nephrotoxins/ NSIADs. Dose medications as per eGFR. At the time of admission pt had Scr of 1.55. His baseline is ~0.8. MATA initially improved to his baseline Scr with blood transfusions and IV fluids. Pt got CT with IV contrast on 10/25/24. But Scr remained around his baseline till 10/31. Pt has mediport for recurrent transfusions. He was later found to have MRSA bacteremia. On IV antibiotics. Acute chest syndrome was ruled out. Pt was given Toradol on 10/30 and Scr started to rise 1.36 on 10/31 and worsened to  2.06 on 11/2. Pt is on IV fluids and IV antibiotics. UA was not done but UPCR showed 3.1 (10/25). MATA in setting of infection related ATN, sickle cell disease and NSAID use.  We recommend to obtain UA, US- kidney and bladder. Repeat Urine electrolytes and UPCR.  Monitor I/Os and labs. Avoid nephrotoxins/ NSIADs. Dose medications as per eGFR.    Discussed with overnight attending, Dr. Perry.

## 2024-11-03 NOTE — PROGRESS NOTE ADULT - SUBJECTIVE AND OBJECTIVE BOX
[facility] Division of Hospital Medicine  ARMINDAto Shai VIGIL  Pager [phone]  Available via MS Teams    SUBJECTIVE / OVERNIGHT EVENTS: patient states that he feels dizzy and tired this AM. Denies any chest pain or SOB. States that he has been having BMS and urinarting appropriately.     ADDITIONAL REVIEW OF SYSTEMS:    MEDICATIONS  (STANDING):  allopurinol 50 milliGRAM(s) Oral daily  chlorhexidine 2% Cloths 1 Application(s) Topical <User Schedule>  folic acid 1 milliGRAM(s) Oral every 24 hours  HYDROmorphone PCA (1 mG/mL) 30 milliLiter(s) PCA Continuous PCA Continuous  pantoprazole    Tablet 40 milliGRAM(s) Oral before breakfast  polyethylene glycol 3350 17 Gram(s) Oral daily  senna 2 Tablet(s) Oral at bedtime  sodium chloride 0.45%. 1000 milliLiter(s) (100 mL/Hr) IV Continuous <Continuous>    MEDICATIONS  (PRN):  acetaminophen     Tablet .. 650 milliGRAM(s) Oral every 6 hours PRN Temp greater or equal to 38C (100.4F), Mild Pain (1 - 3)  diphenhydrAMINE 25 milliGRAM(s) Oral every 6 hours PRN Rash and/or Itching  melatonin 3 milliGRAM(s) Oral at bedtime PRN Insomnia  naloxone Injectable 0.1 milliGRAM(s) IV Push every 3 minutes PRN For ANY of the following changes in patient status:  A. RR LESS THAN 10 breaths per minute, B. Oxygen saturation LESS THAN 90%, C. Sedation score of 6  ondansetron    Tablet 4 milliGRAM(s) Oral every 6 hours PRN Nausea and/or Vomiting  ondansetron Injectable 4 milliGRAM(s) IV Push every 6 hours PRN Nausea      I&O's Summary      PHYSICAL EXAM:  Vital Signs Last 24 Hrs  T(C): 36.8 (03 Nov 2024 09:30), Max: 36.8 (03 Nov 2024 09:30)  T(F): 98.2 (03 Nov 2024 09:30), Max: 98.2 (03 Nov 2024 09:30)  HR: 92 (03 Nov 2024 09:30) (67 - 104)  BP: 142/89 (03 Nov 2024 09:30) (142/89 - 153/80)  BP(mean): --  RR: 18 (03 Nov 2024 09:30) (17 - 18)  SpO2: 99% (03 Nov 2024 09:30) (96% - 100%)    Parameters below as of 03 Nov 2024 05:30  Patient On (Oxygen Delivery Method): nasal cannula  O2 Flow (L/min): 2    CONSTITUTIONAL: NAD, well-developed, well-groomed, +scleral icterus   RESPIRATORY: Normal respiratory effort; lungs are clear to auscultation bilaterally  CARDIOVASCULAR: Regular rate and rhythm, normal S1 and S2, no murmur/rub/gallop; No lower extremity edema; Peripheral pulses are 2+ bilaterally  ABDOMEN: Nontender to palpation, normoactive bowel sounds, no rebound/guarding; No hepatosplenomegaly  MUSCULOSKELETAL:  Normal gait; no clubbing or cyanosis of digits; no joint swelling or tenderness to palpation  PSYCH: A+O to person, place, and time; affect appropriate  NEUROLOGY: CN 2-12 are intact and symmetric; no gross sensory deficits   SKIN: No rashes; no palpable lesions    LABS:                        5.5    10.38 )-----------( 272      ( 02 Nov 2024 21:21 )             15.4     11-02    135  |  105  |  35[H]  ----------------------------<  119[H]  4.5   |  16[L]  |  2.06[H]    Ca    8.7      02 Nov 2024 21:21  Phos  3.5     11-02  Mg     1.60     11-02    TPro  6.9  /  Alb  3.6  /  TBili  4.4[H]  /  DBili  x   /  AST  119[H]  /  ALT  42[H]  /  AlkPhos  140[H]  11-02          Urinalysis Basic - ( 02 Nov 2024 21:21 )    Color: x / Appearance: x / SG: x / pH: x  Gluc: 119 mg/dL / Ketone: x  / Bili: x / Urobili: x   Blood: x / Protein: x / Nitrite: x   Leuk Esterase: x / RBC: x / WBC x   Sq Epi: x / Non Sq Epi: x / Bacteria: x

## 2024-11-03 NOTE — PROGRESS NOTE ADULT - ASSESSMENT
45 y/o M with hx of sickle cell disease, cirrhosis presented with anemia and sickle cell crisis - with MATA

## 2024-11-03 NOTE — PROGRESS NOTE ADULT - SUBJECTIVE AND OBJECTIVE BOX
[facility] DIVISION OF KIDNEY DISEASES AND HYPERTENSION -- [phone]  -- INITIAL CONSULT NOTE  --------------------------------------------------------------------------------  HPI:  43 y/o M with hx of sickle cell anemia, cirrhosis, hemochromatosis and gout who presented with a low hemoglobin. His PCP advised him to be admitted for blood transfusion. Pt also had lower back and lower extremity pain similar to prior sickle cell crisis episodes. Pt had some exertional dyspnea but denies chest pain/ abdominal pain/ HA.   Nephrology was consulted for management of MATA. At the time of admission pt had Scr of 1.55. His baseline is ~0.8. MATA initially improved to his baseline Scr with blood transfusions and IV fluids. Pt got CT with IV contrast on 10/25/24. But Scr remained around his baseline till 10/31. Pt has mediport for recurrent transfusions. He was later found to have MRSA bacteremia. On IV antibiotics. Acute chest syndrome was ruled out. Pt was given Toradol on 10/30 and Scr started to rise 1.36 on 10/31 and worsened to  2.06 on   Pt reports compliance with the medications and follow ups. He smokes Marijuana occasionally, takes alcohol rarely. Denies any NSAIDs as out pt.  Does not use any other recreational drugs. Pt still has abdominal bloating and B/L pedal edema. He denies any dysuria/ hematuria/ change in UOP.       PAST HISTORY  --------------------------------------------------------------------------------  PAST MEDICAL & SURGICAL HISTORY:  Sickle cell anemia    Gout    History of cholecystectomy        FAMILY HISTORY:  Family history of sickle cell trait (Father, Mother)    Patient's father is  (Father)    Patient's mother is  (Mother)      PAST SOCIAL HISTORY:    ALLERGIES & MEDICATIONS  --------------------------------------------------------------------------------  Allergies    ceftriaxone (Anaphylaxis)  piperacillin-tazobactam (Urticaria)  penicillin (Pruritus)  hydroxyurea (Other)    Intolerances      Standing Inpatient Medications  allopurinol 50 milliGRAM(s) Oral daily  chlorhexidine 2% Cloths 1 Application(s) Topical <User Schedule>  folic acid 1 milliGRAM(s) Oral every 24 hours  HYDROmorphone PCA (1 mG/mL) 30 milliLiter(s) PCA Continuous PCA Continuous  pantoprazole    Tablet 40 milliGRAM(s) Oral before breakfast  polyethylene glycol 3350 17 Gram(s) Oral daily  senna 2 Tablet(s) Oral at bedtime  sodium chloride 0.45%. 1000 milliLiter(s) IV Continuous <Continuous>    PRN Inpatient Medications  acetaminophen     Tablet .. 650 milliGRAM(s) Oral every 6 hours PRN  diphenhydrAMINE 25 milliGRAM(s) Oral every 6 hours PRN  melatonin 3 milliGRAM(s) Oral at bedtime PRN  naloxone Injectable 0.1 milliGRAM(s) IV Push every 3 minutes PRN  ondansetron    Tablet 4 milliGRAM(s) Oral every 6 hours PRN  ondansetron Injectable 4 milliGRAM(s) IV Push every 6 hours PRN      REVIEW OF SYSTEMS  --------------------------------------------------------------------------------  Gen: No fevers/chills  Skin: No rashes  Head/Eyes/Ears: Normal hearing  Respiratory: On supplemental oxygen  CV: No chest pain  GI: No abdominal pain, diarrhea  : No dysuria, hematuria  MSK: + B/L edema  Heme: No easy bruising or bleeding  Psych: No significant depression    All other systems were reviewed and are negative, except as noted.    VITALS/PHYSICAL EXAM  --------------------------------------------------------------------------------  T(C): 36.8 (24 @ 18:17), Max: 36.8 (24 @ 09:30)  HR: 92 (24 18:17) (84 - 104)  BP: 157/85 (24 @ 18:17) (142/89 - 157/85)  RR: 18 (24 18:17) (17 - 18)  SpO2: 99% (24 18:17) (96% - 100%)  Wt(kg): --        Physical Exam:  Gen: On supplemental oxygen  HEENT: MMM  Pulm: Lower zone decreased breath sounds   CV: S1S2  Abd: Soft, +BS   Ext: Pitting LE edema B/L  Neuro: Awake  Skin: Warm and dry  Vascular access: Mediport RIJ and peripheral IVs.     LABS/STUDIES  --------------------------------------------------------------------------------              5.5    10.38 >-----------<  272      [24 @ 21:21]              15.4     135  |  105  |  35  ----------------------------<  119      [24 @ 21:21]  4.5   |  16  |  2.06        Ca     8.7     [24 @ 21:21]      Mg     1.60     [24 @ 21:21]      Phos  3.5     [24 @ 21:21]    TPro  6.9  /  Alb  3.6  /  TBili  4.4  /  DBili  x   /  AST  119  /  ALT  42  /  AlkPhos  140  [24 @ 21:21]    Creatinine Trend:  SCr 2.06 [ 21:21]  SCr 1.75 [ 15:33]  SCr 1.36 [10-31 @ 06:50]  SCr 0.84 [10-30 @ 19:12]  SCr 0.86 [10-29 @ 06:00]      Iron 428, TIBC 541, %sat 79      [10-24-24 @ 23:48]  Ferritin 3189      [10-28-24 @ 06:55]  Lipid: chol 115, , HDL 23, LDL --      [23 @ 07:24]    HBsAb 18.1      [24 @ 04:30]  HBsAg Nonreact      [10-26-24 @ 06:57]  HBcAb Nonreact      [24 @ 04:30]  HCV 0.09, Nonreact      [10-26-24 @ 06:57]  HIV Nonreact      [10-13-23 @ 01:00]    OSMANY: titer 1:160, pattern Homogeneous      [24 @ 04:30]

## 2024-11-03 NOTE — DISCHARGE NOTE PROVIDER - NSFOLLOWUPCLINICS_GEN_ALL_ED_FT
[facility]  Nephrology  [address] [place] [address], [address] [place] [address]  Phone: [phone]  Fax:      [facility]  Nephrology  [address] [facility], [address]nd [place] [address]  Phone: [phone]  Fax:     [facility]  Gastroenterology  [address] [place] [address] [address]  [place] [address]  Phone: [phone]  Fax:

## 2024-11-03 NOTE — DISCHARGE NOTE PROVIDER - ATTENDING DISCHARGE PHYSICAL EXAMINATION:
Pt seen and evaluated at bedside this AM. No o/n events. Continues to do well.    Gen- In bed, NAD  Resp- CTAB, good effort.  CVS- RRR, S1S2, +BLE edema - improved from prior.  GI- Soft abd, NT, ND, +BSx4  Ext- No C/C.   Neuro- CN II-XII intact. Speech fluent/face symmetric.

## 2024-11-03 NOTE — DISCHARGE NOTE PROVIDER - NSDCMRMEDTOKEN_GEN_ALL_CORE_FT
allopurinol 100 mg oral tablet: 1 tab(s) orally once a day  folic acid 1 mg oral tablet: 1 tab(s) orally every 24 hours  omeprazole 20 mg oral delayed release capsule: 1 cap(s) orally once a day  oxyCODONE 30 mg oral tablet: 1 tab(s) orally every 4 hours as needed for  severe pain  polyethylene glycol 3350 oral powder for reconstitution: 17 gram(s) orally once a day  senna leaf extract oral tablet: 2 tab(s) orally once a day (at bedtime) Do not use if you develop diarrhea   allopurinol 100 mg oral tablet: 0.5 tab(s) orally once a day  folic acid 1 mg oral tablet: 1 tab(s) orally every 24 hours  furosemide 40 mg oral tablet: 1 tab(s) orally once a day  hydrALAZINE 50 mg oral tablet: 1 tab(s) orally 3 times a day  Jadenu 360 mg oral tablet: 2 tab(s) orally once a day  omeprazole 20 mg oral delayed release capsule: 1 cap(s) orally once a day  oxyCODONE 30 mg oral tablet: 1 tab(s) orally every 4 hours as needed for  severe pain  polyethylene glycol 3350 oral powder for reconstitution: 17 gram(s) orally once a day  senna leaf extract oral tablet: 2 tab(s) orally once a day (at bedtime) Do not use if you develop diarrhea

## 2024-11-03 NOTE — CONSULT NOTE ADULT - PROBLEM SELECTOR RECOMMENDATION 9
At the time of admission pt had Scr of 1.55. His baseline is ~0.8. MATA initially improved to his baseline Scr with blood transfusions and IV fluids. Pt got CT with IV contrast on 10/25/24. But Scr remained around his baseline till 10/31. Pt has mediport for recurrent transfusions. He was later found to have MRSA bacteremia. On IV antibiotics. Acute chest syndrome was ruled out. Pt was given Toradol on 10/30 and Scr started to rise 1.36 on 10/31 and worsened to  2.06 on 11/2. Pt is on IV fluids and IV antibiotics. UA was not done but UPCR showed 3.1 (10/25). MATA in setting of infection related ATN, sickle cell disease and NSAID use.  We recommend to obtain UA, US- kidney and bladder. Repeat Urine electrolytes and UPCR.  Monitor I/Os and labs. Avoid nephrotoxins/ NSIADs. Dose medications as per eGFR.    Discussed with overnight attending, Dr. Perry.

## 2024-11-03 NOTE — PROGRESS NOTE ADULT - PROBLEM SELECTOR PLAN 5
- in setting of acute sickle cell crisis  - Cr increasing 2.07, increased fluid rate 100cc/hr   - repeat CMP in AM  - pending urine lytes   - nephrolgy consulted, f/u recs  - will need to obtain labs with pediatric tubes

## 2024-11-03 NOTE — DISCHARGE NOTE PROVIDER - NSDCCPGOAL_GEN_ALL_CORE_FT
How Severe Is Your Skin Lesion?: moderate
Has Your Skin Lesion Been Treated?: not been treated
Is This A New Presentation, Or A Follow-Up?: Growth
To get better and follow your care plan as instructed.

## 2024-11-03 NOTE — DISCHARGE NOTE PROVIDER - CARE PROVIDERS DIRECT ADDRESSES
,DirectAddress_Unknown ,DirectAddress_Unknown,[email or web address]

## 2024-11-03 NOTE — DISCHARGE NOTE PROVIDER - NSDCCPCAREPLAN_GEN_ALL_CORE_FT
PRINCIPAL DISCHARGE DIAGNOSIS  Diagnosis: Anemia, sickle cell with crisis  Assessment and Plan of Treatment: You came in for decreased blood counts. You received a total of _________ Please follow up with your outpatient hematologist.      SECONDARY DISCHARGE DIAGNOSES  Diagnosis: MRSA bacteremia  Assessment and Plan of Treatment: Continue abx till 11/10     PRINCIPAL DISCHARGE DIAGNOSIS  Diagnosis: Anemia, sickle cell with crisis  Assessment and Plan of Treatment: You came in for decreased blood counts. You received a total of 4 units of blood.  Please take your medication as prescribed Please follow up with your outpatient hematologist.      SECONDARY DISCHARGE DIAGNOSES  Diagnosis: Gout  Assessment and Plan of Treatment: You had a gount flare while in the hospital which was treated with prednisone due to your kidney function.  This episode resolved    Diagnosis: Transaminitis  Assessment and Plan of Treatment: You have mildly elevated liver tests.  Please follow up with hepatology as outpatient for monitoring    Diagnosis: MRSA bacteremia  Assessment and Plan of Treatment: You completed your antibiotics for your infection while in the hospital.    Diagnosis: MATA (acute kidney injury)  Assessment and Plan of Treatment: Your kidneys were working hard due to your infection; You were seen by the kidney doctors in the hosptial and monitored closely, this is improving     PRINCIPAL DISCHARGE DIAGNOSIS  Diagnosis: Anemia, sickle cell with crisis  Assessment and Plan of Treatment: You came in for decreased blood counts. You received a total of 4 units of blood.  Please take your medication as prescribed Please follow up with your outpatient hematologist.      SECONDARY DISCHARGE DIAGNOSES  Diagnosis: MRSA bacteremia  Assessment and Plan of Treatment: You completed your antibiotics for your infection while in the hospital.    Diagnosis: Gout  Assessment and Plan of Treatment: You had a gount flare while in the hospital which was treated with prednisone due to your kidney function.  This episode resolved    Diagnosis: MATA (acute kidney injury)  Assessment and Plan of Treatment: Your kidneys were working hard due to your infection; You were seen by the kidney doctors in the hosptial and monitored closely, this is improving    Diagnosis: Transaminitis  Assessment and Plan of Treatment: You have mildly elevated liver tests.  Please follow up with hepatology as outpatient for monitoring. Imaging is suggestive of cirrhosis w/ signs of hemochromatosis in the setting of chronic transfusions.

## 2024-11-03 NOTE — DISCHARGE NOTE PROVIDER - CARE PROVIDER_API CALL
Nikolai Maxwell  Internal Medicine  [address] [place] [address], [address]  [place] [address]  Phone: [phone]  Fax: [phone]  Follow Up Time: 1 week   Nikolai Maxwell  Internal Medicine  [address] [place] [address], [address]  [place] [address]  Phone: [phone]  Fax: [phone]  Follow Up Time: 1 week    Sunil Mayes  Nephrology  [address] [place] [address]  Phone: [phone]  Fax: [phone]  Follow Up Time:

## 2024-11-03 NOTE — DISCHARGE NOTE PROVIDER - NSDCFUADDAPPT_GEN_ALL_CORE_FT
Please follow up with your primary care provider in 1-2 weeks following discharge from the hospital for continued monitoring and management.     Please follow up with the nephrology office within the next two weeks. Information above  Please follow up with your primary care provider in 1-2 weeks following discharge from the hospital for continued monitoring and management.     Please follow up with the nephrology office within the next two weeks. Information above     Please also follow up with hepatology for further monitoring of liver disease.

## 2024-11-04 LAB
ALBUMIN SERPL ELPH-MCNC: 3.7 G/DL — SIGNIFICANT CHANGE UP (ref 3.3–5)
ALP SERPL-CCNC: 142 U/L — HIGH (ref 40–120)
ALT FLD-CCNC: 46 U/L — HIGH (ref 4–41)
ANION GAP SERPL CALC-SCNC: 14 MMOL/L — SIGNIFICANT CHANGE UP (ref 7–14)
APPEARANCE UR: CLEAR — SIGNIFICANT CHANGE UP
AST SERPL-CCNC: 129 U/L — HIGH (ref 4–40)
BACTERIA # UR AUTO: NEGATIVE /HPF — SIGNIFICANT CHANGE UP
BILIRUB SERPL-MCNC: 4.8 MG/DL — HIGH (ref 0.2–1.2)
BILIRUB UR-MCNC: NEGATIVE — SIGNIFICANT CHANGE UP
BLD GP AB SCN SERPL QL: NEGATIVE — SIGNIFICANT CHANGE UP
BUN SERPL-MCNC: 36 MG/DL — HIGH (ref 7–23)
CALCIUM SERPL-MCNC: 8.4 MG/DL — SIGNIFICANT CHANGE UP (ref 8.4–10.5)
CAST: 1 /LPF — SIGNIFICANT CHANGE UP (ref 0–4)
CHLORIDE SERPL-SCNC: 105 MMOL/L — SIGNIFICANT CHANGE UP (ref 98–107)
CO2 SERPL-SCNC: 16 MMOL/L — LOW (ref 22–31)
COLOR SPEC: SIGNIFICANT CHANGE UP
CREAT ?TM UR-MCNC: 64 MG/DL — SIGNIFICANT CHANGE UP
CREAT SERPL-MCNC: 2.31 MG/DL — HIGH (ref 0.5–1.3)
DIFF PNL FLD: ABNORMAL
EGFR: 35 ML/MIN/1.73M2 — LOW
GLUCOSE SERPL-MCNC: 107 MG/DL — HIGH (ref 70–99)
GLUCOSE UR QL: NEGATIVE MG/DL — SIGNIFICANT CHANGE UP
HAPTOGLOB SERPL-MCNC: <20 MG/DL — LOW (ref 34–200)
HCT VFR BLD CALC: 14 % — CRITICAL LOW (ref 39–50)
HCT VFR BLD CALC: 15 % — CRITICAL LOW (ref 39–50)
HCT VFR BLD CALC: 16.4 % — CRITICAL LOW (ref 39–50)
HGB BLD-MCNC: 5.1 G/DL — CRITICAL LOW (ref 13–17)
HGB BLD-MCNC: 5.4 G/DL — CRITICAL LOW (ref 13–17)
HGB BLD-MCNC: 5.8 G/DL — CRITICAL LOW (ref 13–17)
KETONES UR-MCNC: NEGATIVE MG/DL — SIGNIFICANT CHANGE UP
LDH SERPL L TO P-CCNC: 1412 U/L — HIGH (ref 135–225)
LEUKOCYTE ESTERASE UR-ACNC: NEGATIVE — SIGNIFICANT CHANGE UP
MAGNESIUM SERPL-MCNC: 1.6 MG/DL — SIGNIFICANT CHANGE UP (ref 1.6–2.6)
MCHC RBC-ENTMCNC: 29.1 PG — SIGNIFICANT CHANGE UP (ref 27–34)
MCHC RBC-ENTMCNC: 29.4 PG — SIGNIFICANT CHANGE UP (ref 27–34)
MCHC RBC-ENTMCNC: 29.8 PG — SIGNIFICANT CHANGE UP (ref 27–34)
MCHC RBC-ENTMCNC: 35.4 G/DL — SIGNIFICANT CHANGE UP (ref 32–36)
MCHC RBC-ENTMCNC: 36 G/DL — SIGNIFICANT CHANGE UP (ref 32–36)
MCHC RBC-ENTMCNC: 36.4 G/DL — HIGH (ref 32–36)
MCV RBC AUTO: 80 FL — SIGNIFICANT CHANGE UP (ref 80–100)
MCV RBC AUTO: 82.9 FL — SIGNIFICANT CHANGE UP (ref 80–100)
MCV RBC AUTO: 83.2 FL — SIGNIFICANT CHANGE UP (ref 80–100)
NITRITE UR-MCNC: NEGATIVE — SIGNIFICANT CHANGE UP
NRBC # BLD: 0 /100 WBCS — SIGNIFICANT CHANGE UP (ref 0–0)
NRBC # FLD: 0.03 K/UL — HIGH (ref 0–0)
PH UR: 5.5 — SIGNIFICANT CHANGE UP (ref 5–8)
PHOSPHATE SERPL-MCNC: 4.2 MG/DL — SIGNIFICANT CHANGE UP (ref 2.5–4.5)
PLATELET # BLD AUTO: 277 K/UL — SIGNIFICANT CHANGE UP (ref 150–400)
PLATELET # BLD AUTO: 310 K/UL — SIGNIFICANT CHANGE UP (ref 150–400)
PLATELET # BLD AUTO: 319 K/UL — SIGNIFICANT CHANGE UP (ref 150–400)
POTASSIUM SERPL-MCNC: 5.2 MMOL/L — SIGNIFICANT CHANGE UP (ref 3.5–5.3)
POTASSIUM SERPL-SCNC: 5.2 MMOL/L — SIGNIFICANT CHANGE UP (ref 3.5–5.3)
POTASSIUM UR-SCNC: 16.3 MMOL/L — SIGNIFICANT CHANGE UP
PROT ?TM UR-MCNC: 202 MG/DL — SIGNIFICANT CHANGE UP
PROT SERPL-MCNC: 7.4 G/DL — SIGNIFICANT CHANGE UP (ref 6–8.3)
PROT UR-MCNC: 300 MG/DL
PROT/CREAT UR-RTO: 3.2 RATIO — HIGH (ref 0–0.2)
RBC # BLD: 1.75 M/UL — LOW (ref 4.2–5.8)
RBC # BLD: 1.75 M/UL — LOW (ref 4.2–5.8)
RBC # BLD: 1.81 M/UL — LOW (ref 4.2–5.8)
RBC # BLD: 1.97 M/UL — LOW (ref 4.2–5.8)
RBC # FLD: 21.2 % — HIGH (ref 10.3–14.5)
RBC # FLD: 21.4 % — HIGH (ref 10.3–14.5)
RBC # FLD: 22.5 % — HIGH (ref 10.3–14.5)
RBC CASTS # UR COMP ASSIST: 4 /HPF — SIGNIFICANT CHANGE UP (ref 0–4)
RETICS #: 84.9 K/UL — SIGNIFICANT CHANGE UP (ref 25–125)
RETICS/RBC NFR: 4.9 % — HIGH (ref 0.5–2.5)
RH IG SCN BLD-IMP: POSITIVE — SIGNIFICANT CHANGE UP
SODIUM SERPL-SCNC: 135 MMOL/L — SIGNIFICANT CHANGE UP (ref 135–145)
SODIUM UR-SCNC: 56 MMOL/L — SIGNIFICANT CHANGE UP
SP GR SPEC: 1.01 — SIGNIFICANT CHANGE UP (ref 1–1.03)
SQUAMOUS # UR AUTO: 1 /HPF — SIGNIFICANT CHANGE UP (ref 0–5)
UROBILINOGEN FLD QL: 1 MG/DL — SIGNIFICANT CHANGE UP (ref 0.2–1)
UUN UR-MCNC: 381.1 MG/DL — SIGNIFICANT CHANGE UP
VANCOMYCIN FLD-MCNC: 10.6 UG/ML — SIGNIFICANT CHANGE UP
WBC # BLD: 10.1 K/UL — SIGNIFICANT CHANGE UP (ref 3.8–10.5)
WBC # BLD: 9.75 K/UL — SIGNIFICANT CHANGE UP (ref 3.8–10.5)
WBC # BLD: 9.85 K/UL — SIGNIFICANT CHANGE UP (ref 3.8–10.5)
WBC # FLD AUTO: 10.1 K/UL — SIGNIFICANT CHANGE UP (ref 3.8–10.5)
WBC # FLD AUTO: 9.75 K/UL — SIGNIFICANT CHANGE UP (ref 3.8–10.5)
WBC # FLD AUTO: 9.85 K/UL — SIGNIFICANT CHANGE UP (ref 3.8–10.5)
WBC UR QL: 1 /HPF — SIGNIFICANT CHANGE UP (ref 0–5)

## 2024-11-04 PROCEDURE — 99233 SBSQ HOSP IP/OBS HIGH 50: CPT

## 2024-11-04 PROCEDURE — 99222 1ST HOSP IP/OBS MODERATE 55: CPT

## 2024-11-04 PROCEDURE — 76770 US EXAM ABDO BACK WALL COMP: CPT | Mod: 26

## 2024-11-04 PROCEDURE — 99232 SBSQ HOSP IP/OBS MODERATE 35: CPT

## 2024-11-04 RX ORDER — DIPHENHYDRAMINE HCL 12.5MG/5ML
25 ELIXIR ORAL ONCE
Refills: 0 | Status: COMPLETED | OUTPATIENT
Start: 2024-11-04 | End: 2024-11-04

## 2024-11-04 RX ORDER — SODIUM BICARBONATE 1 MEQ/ML
0.09 VIAL (ML) INTRAVENOUS
Qty: 75 | Refills: 0 | Status: DISCONTINUED | OUTPATIENT
Start: 2024-11-04 | End: 2024-11-06

## 2024-11-04 RX ORDER — DAPTOMYCIN 500 MG/10ML
550 INJECTION, POWDER, LYOPHILIZED, FOR SOLUTION INTRAVENOUS EVERY 24 HOURS
Refills: 0 | Status: COMPLETED | OUTPATIENT
Start: 2024-11-04 | End: 2024-11-10

## 2024-11-04 RX ADMIN — DAPTOMYCIN 122 MILLIGRAM(S): 500 INJECTION, POWDER, LYOPHILIZED, FOR SOLUTION INTRAVENOUS at 17:52

## 2024-11-04 RX ADMIN — Medication 30 MILLILITER(S): at 07:41

## 2024-11-04 RX ADMIN — Medication 30 MILLILITER(S): at 20:07

## 2024-11-04 RX ADMIN — Medication 650 MILLIGRAM(S): at 15:07

## 2024-11-04 RX ADMIN — Medication 25 MILLIGRAM(S): at 15:07

## 2024-11-04 RX ADMIN — Medication 100 MILLILITER(S): at 07:06

## 2024-11-04 RX ADMIN — Medication 2 TABLET(S): at 21:38

## 2024-11-04 RX ADMIN — Medication 50 MILLIGRAM(S): at 11:11

## 2024-11-04 RX ADMIN — FOLIC ACID 1 MILLIGRAM(S): 1 TABLET ORAL at 11:10

## 2024-11-04 RX ADMIN — Medication 650 MILLIGRAM(S): at 16:37

## 2024-11-04 RX ADMIN — PANTOPRAZOLE SODIUM 40 MILLIGRAM(S): 40 TABLET, DELAYED RELEASE ORAL at 06:28

## 2024-11-04 RX ADMIN — Medication 30 MILLILITER(S): at 19:21

## 2024-11-04 RX ADMIN — Medication 100 MEQ/KG/HR: at 15:08

## 2024-11-04 RX ADMIN — POLYETHYLENE GLYCOL 3350 17 GRAM(S): 17 POWDER, FOR SOLUTION ORAL at 11:11

## 2024-11-04 RX ADMIN — CHLORHEXIDINE GLUCONATE 1 APPLICATION(S): 40 SOLUTION TOPICAL at 06:28

## 2024-11-04 NOTE — PROGRESS NOTE ADULT - SUBJECTIVE AND OBJECTIVE BOX
44yPatient is a 44y old  Male who presents with a chief complaint of sickle cell crisis (04 Nov 2024 15:39)      Interval history:  Afebrile, continues to complain of pain in low back area.       Allergies:   ceftriaxone (Anaphylaxis)  piperacillin-tazobactam (Urticaria)  penicillin (Pruritus)  hydroxyurea (Other)    Antimicrobials:  DAPTOmycin IVPB 550 milliGRAM(s) IV Intermittent every 24 hours      REVIEW OF SYSTEMS:  No chest pain   No abdominal pain  No rash.     Vital Signs Last 24 Hrs  T(C): 36.9 (11-04-24 @ 18:45), Max: 36.9 (11-04-24 @ 01:26)  T(F): 98.5 (11-04-24 @ 18:45), Max: 98.5 (11-04-24 @ 01:26)  HR: 84 (11-04-24 @ 18:45) (84 - 101)  BP: 141/87 (11-04-24 @ 18:45) (141/87 - 175/93)  BP(mean): --  RR: 17 (11-04-24 @ 18:45) (17 - 19)  SpO2: 99% (11-04-24 @ 18:45) (95% - 100%)      PHYSICAL EXAM:  Pt in no acute distress, alert, awake.   breathing comfortably on NC   non distended abdomen  no edema LE   no phlebitis                             5.1    9.85  )-----------( 310      ( 04 Nov 2024 10:58 )             14.0   11-04    135  |  105  |  36[H]  ----------------------------<  107[H]  5.2   |  16[L]  |  2.31[H]    Ca    8.4      04 Nov 2024 10:58  Phos  4.2     11-04  Mg     1.60     11-04    TPro  7.4  /  Alb  3.7  /  TBili  4.8[H]  /  DBili  x   /  AST  129[H]  /  ALT  46[H]  /  AlkPhos  142[H]  11-04      LIVER FUNCTIONS - ( 04 Nov 2024 10:58 )  Alb: 3.7 g/dL / Pro: 7.4 g/dL / ALK PHOS: 142 U/L / ALT: 46 U/L / AST: 129 U/L / GGT: x                 < from: US Kidney and Bladder (11.04.24 @ 09:49) >  IMPRESSION:  Normal renal ultrasound.

## 2024-11-04 NOTE — PROGRESS NOTE ADULT - SUBJECTIVE AND OBJECTIVE BOX
[facility] Division of Hospital Medicine  Shiraz FARR GinnyOlegJamie Bernabe MD  Pager [phone]    SUBJECTIVE:  Chief complaint: VOC.    Pt was seen and evaluated at bedside this AM. No o/n events. Denies any SOB/CP/NV. Continues to report 10/10 lower back pain. No extremity pain. States he has been having swelling - but much improved today.      ROS: All systems negative except as noted.      Vital Signs Last 24 Hrs  T(C): 36.9 (04 Nov 2024 15:15), Max: 36.9 (04 Nov 2024 01:26)  T(F): 98.4 (04 Nov 2024 15:15), Max: 98.5 (04 Nov 2024 01:26)  HR: 89 (04 Nov 2024 15:15) (88 - 100)  BP: 151/88 (04 Nov 2024 15:15) (149/81 - 175/93)  BP(mean): --  RR: 17 (04 Nov 2024 15:15) (17 - 19)  SpO2: 100% (04 Nov 2024 15:15) (95% - 100%)    Parameters below as of 04 Nov 2024 15:15  Patient On (Oxygen Delivery Method): nasal cannula  O2 Flow (L/min): 2        PHYSICAL EXAM:  Gen- in bed, NAD  Resp- CTAB, good effort  CVs- RRR, S1S2, no g/r/m. +BLE edema R>L  GI- Soft abd, NT, ND, +BSx4  Ext- No C/C.   Neuro- CN II-XII intact. Speech fluent/face symmetric.  Psych- AAOx4.      MEDICATION:  MEDICATIONS  (STANDING):  allopurinol 50 milliGRAM(s) Oral daily  chlorhexidine 2% Cloths 1 Application(s) Topical <User Schedule>  DAPTOmycin IVPB 550 milliGRAM(s) IV Intermittent every 24 hours  folic acid 1 milliGRAM(s) Oral every 24 hours  HYDROmorphone PCA (1 mG/mL) 30 milliLiter(s) PCA Continuous PCA Continuous  pantoprazole    Tablet 40 milliGRAM(s) Oral before breakfast  polyethylene glycol 3350 17 Gram(s) Oral daily  senna 2 Tablet(s) Oral at bedtime  sodium bicarbonate  Infusion 0.087 mEq/kG/Hr (100 mL/Hr) IV Continuous <Continuous>    MEDICATIONS  (PRN):  acetaminophen     Tablet .. 650 milliGRAM(s) Oral every 6 hours PRN Temp greater or equal to 38C (100.4F), Mild Pain (1 - 3)  diphenhydrAMINE 25 milliGRAM(s) Oral every 6 hours PRN Rash and/or Itching  melatonin 3 milliGRAM(s) Oral at bedtime PRN Insomnia  naloxone Injectable 0.1 milliGRAM(s) IV Push every 3 minutes PRN For ANY of the following changes in patient status:  A. RR LESS THAN 10 breaths per minute, B. Oxygen saturation LESS THAN 90%, C. Sedation score of 6  ondansetron    Tablet 4 milliGRAM(s) Oral every 6 hours PRN Nausea and/or Vomiting  ondansetron Injectable 4 milliGRAM(s) IV Push every 6 hours PRN Nausea            LABORATORY:                          5.1    9.85  )-----------( 310      ( 04 Nov 2024 10:58 )             14.0     11-04    135  |  105  |  36[H]  ----------------------------<  107[H]  5.2   |  16[L]  |  2.31[H]    Ca    8.4      04 Nov 2024 10:58  Phos  4.2     11-04  Mg     1.60     11-04    TPro  7.4  /  Alb  3.7  /  TBili  4.8[H]  /  DBili  x   /  AST  129[H]  /  ALT  46[H]  /  AlkPhos  142[H]  11-04      Urinalysis Basic - ( 04 Nov 2024 10:58 )    Color: x / Appearance: x / SG: x / pH: x  Gluc: 107 mg/dL / Ketone: x  / Bili: x / Urobili: x   Blood: x / Protein: x / Nitrite: x   Leuk Esterase: x / RBC: x / WBC x   Sq Epi: x / Non Sq Epi: x / Bacteria: x

## 2024-11-04 NOTE — PROGRESS NOTE ADULT - PROBLEM SELECTOR PLAN 2
- Hb 5.2 on presentation, baseline Hb 6-6.5  - Hb down to 5.1 today.  - Per chart review: s/p 1 units of pRBCs on 10/25 and 10/29  - caution with excessive transfusion in setting of hemochromatosis – c/w home chelating agents  - Transfuse 1 unit PRBC 11/4. Targeting >5.5 as per OP heme.  - Usage log reviewed - 3.5-8mg q8h used.  - Continue PCA = 0.5mg demand /6min lockout/20mg (4h limit) - NO changes today.  - Monitor pain - trend SC labs.

## 2024-11-04 NOTE — PROGRESS NOTE ADULT - PROBLEM SELECTOR PLAN 1
- patient with Mediport for over 10 years, has good outpatient followup never had infection from port before   - Bcx from 10/25 with MRSA in anaerobic and aerobic bottles   - currently on vancomycin 1g q12 hrs  - ID following, attempting to salvage port line however if MRSA remain positive will likely need to have port removed, as would be considered CLABSI (port placed prior to admission)   - repeat BCx from 10/27, 1 set negative x24hrs; spoke with ID attending no need for additional set on BCx   - BCx NGTD x 72 hrs   - ID recommending 2 weeks abx from negative Bcx on 10/27, so will need antibiotics until 11/10  - need to touch base with CM in if PORT could be used for IV vanc at home   - has port can be used for antibiotics   - monitor for fevers or any signs of systemic infection, if so will need to have port removed acutely (patient aware and amenable if necessary) - patient with Mediport for over 10 years, has good outpatient followup never had infection from port before   - 10.25  - BCx pos MRSA.  - Bcx 10.27 - NGTD.  - Plan for abx through 11/10.   - Was on vanco - but c/b worsening MATA. Now stopped and transitioned to IV daptomycin.

## 2024-11-04 NOTE — PROGRESS NOTE ADULT - PROBLEM SELECTOR PLAN 5
- Upr/cr ratio - 3.2. Multifactorial -- secondary to VOC, supratherapeutic vanco. Concern for component of ATN.  - Cr continues to worsen - up to 2.31.  -Renal US - normal  - Avoid nephrotoxins.  - Trend labs.  -APpreciate neph recs.    #Metabolic acidosis   -In setting of MATA  -Start bicarb drip as per nephro.

## 2024-11-04 NOTE — PROGRESS NOTE ADULT - ASSESSMENT
45 yo M with PMH significant for sickle cell anemia, cirrhosis, hemochromatosis, and gout who presented with a low hemoglobin count from outpatient, also reporting worsening for lower back and lower extremity pain similar to prior sickle cell crises  No fever, has leukocytosis  Medport in place ~10 years, no exterior signs infection  BCXs 4/4 CoNS  Elevated LFTs in setting cirrhosis/hemochromatosis  CT no PE, bilateral opacities, atelectasis, LAD  No other cardiac hardware  Never had infection of catheter prior  Would presume this is true CLABSI, but would attempt to salvage line  Note new rise in CrCl--adjust vanco and monitor levels, monitor Cr    #Staph epi bacteremia in the setting of Mediport   MATA     Recommendations:   given rising Cr, switched to daptomycin  check CPK in am   needs 14 days of therapy total.   plan to salvage the port       Discussed with ACP.     Svetlana Retana  Please contact through MS Teams   If no response or past 5 pm/weekend call [phone].

## 2024-11-04 NOTE — PROGRESS NOTE ADULT - PROBLEM SELECTOR PLAN 7
VTE PPx: encourage ambulation - doubt bleeding -- if stable after transfusion - would consider starting chem ppx given elevated risk.       Dispo- Remains acute -pending renal improvement.

## 2024-11-04 NOTE — PROGRESS NOTE ADULT - ASSESSMENT
Patient is a 43yo M with PMH significant for sickle cell anemia (port in place) , cirrhosis, hemochromatosis, and gout who presented with a low hemoglobin count, admitted with sickle cell crisis. Found  to have MRSA bacteremia on vancomycin. Course c/b anemia requiring transfusions and MATA. Nephro/ID on board.

## 2024-11-04 NOTE — PROGRESS NOTE ADULT - TIME BILLING
Time spent to prepare to see the patient, obtaining and reviewing history, physical examination, explaining the diagnosis, prognosis and treatment plan with the patient/family/caregiver. I also have spent the time ordering studies and testing, interpreting results, medicine reconciliation, subspecialty consultation and documentation as above
- Ordering, reviewing, and interpreting labs, testing, and imaging.  - Independently obtaining a review of systems and performing a physical exam  - Reviewing prior hospitalization and where necessary, outpatient records.  - Reviewing consultant recommendations/communicating with consultants  - Counselling and educating patient and family regarding interpretation of aforementioned items and plan of care.

## 2024-11-05 LAB
ALBUMIN SERPL ELPH-MCNC: 3.7 G/DL — SIGNIFICANT CHANGE UP (ref 3.3–5)
ALP SERPL-CCNC: 156 U/L — HIGH (ref 40–120)
ALT FLD-CCNC: 44 U/L — HIGH (ref 4–41)
ANION GAP SERPL CALC-SCNC: 13 MMOL/L — SIGNIFICANT CHANGE UP (ref 7–14)
AST SERPL-CCNC: 110 U/L — HIGH (ref 4–40)
BASOPHILS # BLD AUTO: 0.17 K/UL — SIGNIFICANT CHANGE UP (ref 0–0.2)
BASOPHILS NFR BLD AUTO: 1.5 % — SIGNIFICANT CHANGE UP (ref 0–2)
BILIRUB SERPL-MCNC: 5.5 MG/DL — HIGH (ref 0.2–1.2)
BUN SERPL-MCNC: 37 MG/DL — HIGH (ref 7–23)
CALCIUM SERPL-MCNC: 8.5 MG/DL — SIGNIFICANT CHANGE UP (ref 8.4–10.5)
CHLORIDE SERPL-SCNC: 104 MMOL/L — SIGNIFICANT CHANGE UP (ref 98–107)
CK SERPL-CCNC: 29 U/L — LOW (ref 30–200)
CO2 SERPL-SCNC: 20 MMOL/L — LOW (ref 22–31)
CREAT SERPL-MCNC: 2.24 MG/DL — HIGH (ref 0.5–1.3)
EGFR: 36 ML/MIN/1.73M2 — LOW
EOSINOPHIL # BLD AUTO: 1.4 K/UL — HIGH (ref 0–0.5)
EOSINOPHIL NFR BLD AUTO: 12 % — HIGH (ref 0–6)
GLUCOSE SERPL-MCNC: 96 MG/DL — SIGNIFICANT CHANGE UP (ref 70–99)
HAPTOGLOB SERPL-MCNC: <20 MG/DL — LOW (ref 34–200)
HCT VFR BLD CALC: 16.1 % — CRITICAL LOW (ref 39–50)
HGB BLD-MCNC: 5.8 G/DL — CRITICAL LOW (ref 13–17)
IANC: 5.1 K/UL — SIGNIFICANT CHANGE UP (ref 1.8–7.4)
IMM GRANULOCYTES NFR BLD AUTO: 0.4 % — SIGNIFICANT CHANGE UP (ref 0–0.9)
LDH SERPL L TO P-CCNC: 1255 U/L — HIGH (ref 135–225)
LYMPHOCYTES # BLD AUTO: 29.1 % — SIGNIFICANT CHANGE UP (ref 13–44)
LYMPHOCYTES # BLD AUTO: 3.38 K/UL — HIGH (ref 1–3.3)
MAGNESIUM SERPL-MCNC: 1.5 MG/DL — LOW (ref 1.6–2.6)
MCHC RBC-ENTMCNC: 29.9 PG — SIGNIFICANT CHANGE UP (ref 27–34)
MCHC RBC-ENTMCNC: 36 G/DL — SIGNIFICANT CHANGE UP (ref 32–36)
MCV RBC AUTO: 83 FL — SIGNIFICANT CHANGE UP (ref 80–100)
MONOCYTES # BLD AUTO: 1.53 K/UL — HIGH (ref 0–0.9)
MONOCYTES NFR BLD AUTO: 13.2 % — SIGNIFICANT CHANGE UP (ref 2–14)
NEUTROPHILS # BLD AUTO: 5.1 K/UL — SIGNIFICANT CHANGE UP (ref 1.8–7.4)
NEUTROPHILS NFR BLD AUTO: 43.8 % — SIGNIFICANT CHANGE UP (ref 43–77)
NRBC # BLD: 0 /100 WBCS — SIGNIFICANT CHANGE UP (ref 0–0)
NRBC # FLD: 0.03 K/UL — HIGH (ref 0–0)
PHOSPHATE SERPL-MCNC: 4.2 MG/DL — SIGNIFICANT CHANGE UP (ref 2.5–4.5)
PLATELET # BLD AUTO: 295 K/UL — SIGNIFICANT CHANGE UP (ref 150–400)
POTASSIUM SERPL-MCNC: 4.4 MMOL/L — SIGNIFICANT CHANGE UP (ref 3.5–5.3)
POTASSIUM SERPL-SCNC: 4.4 MMOL/L — SIGNIFICANT CHANGE UP (ref 3.5–5.3)
PROT SERPL-MCNC: 7.1 G/DL — SIGNIFICANT CHANGE UP (ref 6–8.3)
RBC # BLD: 1.94 M/UL — LOW (ref 4.2–5.8)
RBC # BLD: 1.94 M/UL — LOW (ref 4.2–5.8)
RBC # FLD: 21 % — HIGH (ref 10.3–14.5)
RETICS #: 87.7 K/UL — SIGNIFICANT CHANGE UP (ref 25–125)
RETICS/RBC NFR: 4.5 % — HIGH (ref 0.5–2.5)
SODIUM SERPL-SCNC: 137 MMOL/L — SIGNIFICANT CHANGE UP (ref 135–145)
WBC # BLD: 11.63 K/UL — HIGH (ref 3.8–10.5)
WBC # FLD AUTO: 11.63 K/UL — HIGH (ref 3.8–10.5)

## 2024-11-05 PROCEDURE — 99233 SBSQ HOSP IP/OBS HIGH 50: CPT | Mod: GC

## 2024-11-05 PROCEDURE — 99233 SBSQ HOSP IP/OBS HIGH 50: CPT

## 2024-11-05 RX ADMIN — Medication 30 MILLILITER(S): at 07:36

## 2024-11-05 RX ADMIN — Medication 30 MILLILITER(S): at 14:10

## 2024-11-05 RX ADMIN — PANTOPRAZOLE SODIUM 40 MILLIGRAM(S): 40 TABLET, DELAYED RELEASE ORAL at 06:19

## 2024-11-05 RX ADMIN — POLYETHYLENE GLYCOL 3350 17 GRAM(S): 17 POWDER, FOR SOLUTION ORAL at 11:11

## 2024-11-05 RX ADMIN — DAPTOMYCIN 122 MILLIGRAM(S): 500 INJECTION, POWDER, LYOPHILIZED, FOR SOLUTION INTRAVENOUS at 17:54

## 2024-11-05 RX ADMIN — Medication 50 MILLIGRAM(S): at 11:11

## 2024-11-05 RX ADMIN — FOLIC ACID 1 MILLIGRAM(S): 1 TABLET ORAL at 11:12

## 2024-11-05 RX ADMIN — CHLORHEXIDINE GLUCONATE 1 APPLICATION(S): 40 SOLUTION TOPICAL at 05:32

## 2024-11-05 RX ADMIN — Medication 100 MEQ/KG/HR: at 06:18

## 2024-11-05 RX ADMIN — Medication 2 TABLET(S): at 22:18

## 2024-11-05 RX ADMIN — Medication 30 MILLILITER(S): at 19:21

## 2024-11-05 RX ADMIN — Medication 100 MEQ/KG/HR: at 18:05

## 2024-11-05 NOTE — PROGRESS NOTE ADULT - SUBJECTIVE AND OBJECTIVE BOX
Patient seen today, feeling well  Hgb 5.1 11/4, received 1 unit PRBC, Hgb today 5.8, on Daptomycin for bacteremia      MEDICATIONS  (STANDING):  allopurinol 50 milliGRAM(s) Oral daily  chlorhexidine 2% Cloths 1 Application(s) Topical <User Schedule>  DAPTOmycin IVPB 550 milliGRAM(s) IV Intermittent every 24 hours  folic acid 1 milliGRAM(s) Oral every 24 hours  HYDROmorphone PCA (1 mG/mL) 30 milliLiter(s) PCA Continuous PCA Continuous  pantoprazole    Tablet 40 milliGRAM(s) Oral before breakfast  polyethylene glycol 3350 17 Gram(s) Oral daily  senna 2 Tablet(s) Oral at bedtime  sodium bicarbonate  Infusion 0.087 mEq/kG/Hr (100 mL/Hr) IV Continuous <Continuous>    MEDICATIONS  (PRN):  acetaminophen     Tablet .. 650 milliGRAM(s) Oral every 6 hours PRN Temp greater or equal to 38C (100.4F), Mild Pain (1 - 3)  diphenhydrAMINE 25 milliGRAM(s) Oral every 6 hours PRN Rash and/or Itching  melatonin 3 milliGRAM(s) Oral at bedtime PRN Insomnia  naloxone Injectable 0.1 milliGRAM(s) IV Push every 3 minutes PRN For ANY of the following changes in patient status:  A. RR LESS THAN 10 breaths per minute, B. Oxygen saturation LESS THAN 90%, C. Sedation score of 6  ondansetron    Tablet 4 milliGRAM(s) Oral every 6 hours PRN Nausea and/or Vomiting  ondansetron Injectable 4 milliGRAM(s) IV Push every 6 hours PRN Nausea          Vital Signs Last 24 Hrs  T(C): 36.4 (05 Nov 2024 09:30), Max: 36.9 (04 Nov 2024 15:15)  T(F): 97.6 (05 Nov 2024 09:30), Max: 98.5 (04 Nov 2024 18:45)  HR: 95 (05 Nov 2024 09:30) (84 - 101)  BP: 145/86 (05 Nov 2024 09:30) (141/87 - 165/87)  BP(mean): -  RR: 17 (05 Nov 2024 09:30) (17 - 18)  SpO2: 99% (05 Nov 2024 09:30) (97% - 100%)    Parameters below as of 05 Nov 2024 09:30  Patient On (Oxygen Delivery Method): nasal cannula  O2 Flow (L/min): 2      PE  NAD  Awake, alert  Anicteric, MMM  RRR  CTAB  Abd soft, NT, ND  No c/c/e  No rash grossly                            5.8    11.63 )-----------( 295      ( 05 Nov 2024 08:01 )             16.1       11-05    137  |  104  |  37[H]  ----------------------------<  96  4.4   |  20[L]  |  2.24[H]    Ca    8.5      05 Nov 2024 08:01  Phos  4.2     11-05  Mg     1.50     11-05    TPro  7.1  /  Alb  3.7  /  TBili  5.5[H]  /  DBili  x   /  AST  110[H]  /  ALT  44[H]  /  AlkPhos  156[H]  11-05

## 2024-11-05 NOTE — PROGRESS NOTE ADULT - ASSESSMENT
44 year old male with history of sickle cell disease admitted with CLABSI  No Hypoxia or fevers      Sickle cell disease  -undergoing care with Dr Jordan of [facility]  -Transfuse for Hgb < 5.5  -chronic hemolysis, Iron overload  -pls limit transfusions, maintain Hgb 5.5  -Continue Jadenu  -follow up Dr Jordan after discharge      Bacteremia  -likely in setting of mediport as source  - Bcx from 10/25 with MRSA in anaerobic and aerobic bottles   - currently on Daptomycin  -ID following, if positive cx persist, port to be removed      Meghana Real NP  Hematology/Oncology  [facility]  [phone] (Office)  [phone] (Alt office)  Evenings and weekends please call MD on call or office

## 2024-11-05 NOTE — PROGRESS NOTE ADULT - PROBLEM SELECTOR PLAN 1
- patient with Mediport for over 10 years, has good outpatient followup never had infection from port before   - 10.25  - BCx pos MRSA.  - Bcx 10.27 - NGTD.  - Plan for abx through 11/10.   - Was on vanco - but c/b worsening MATA. Now stopped and transitioned to IV daptomycin.

## 2024-11-05 NOTE — PROGRESS NOTE ADULT - PROBLEM SELECTOR PLAN 2
- Hb 5.2 on presentation, baseline Hb 6-6.5  - Hb down to 5.1 today.  - Per chart review: s/p 1 units of pRBCs on 10/25 and 10/29  - caution with excessive transfusion in setting of hemochromatosis – c/w home chelating agents  - Transfuse 1 unit PRBC 11/4. Targeting >5.5 as per OP heme.  - Usage log reviewed - 3.5-8mg q8h used.  - Continue PCA = 0.5mg demand /6min lockout/20mg (4h limit) - NO changes today.  - Monitor pain - trend SC labs. - Hb 5.2 on presentation, baseline Hb 6-6.5  - Hb down to 5.8 today.  - Per chart review: s/p 1 units of pRBCs on 10/25, 10/29, 11/4  - caution with excessive transfusion in setting of hemochromatosis – c/w home chelating agents (pt to bring it in - f/u in AM)  - Targeting >5.5 as per OP heme.  - Usage log reviewed - 5.5-9mg q4h used.  - Continue PCA = 0.5mg demand /6min lockout/20mg (4h limit) - NO changes today.  - Monitor pain - trend SC labs.  -Heme recs appreciated.

## 2024-11-05 NOTE — PROGRESS NOTE ADULT - SUBJECTIVE AND OBJECTIVE BOX
[facility] Division of Hospital Medicine  Shiraz Soliz) MD Srinivasa  Pager [phone]    SUBJECTIVE:  Chief complaint: ________.    Pt was seen and evaluated       ROS: All systems negative except as noted.      Vital Signs Last 24 Hrs  T(C): 36.7 (05 Nov 2024 13:30), Max: 36.9 (04 Nov 2024 18:45)  T(F): 98.1 (05 Nov 2024 13:30), Max: 98.5 (04 Nov 2024 18:45)  HR: 101 (05 Nov 2024 13:30) (84 - 101)  BP: 153/93 (05 Nov 2024 13:30) (141/87 - 163/97)  BP(mean): --  RR: 18 (05 Nov 2024 13:30) (17 - 18)  SpO2: 100% (05 Nov 2024 13:30) (97% - 100%)    Parameters below as of 05 Nov 2024 13:30  Patient On (Oxygen Delivery Method): nasal cannula  O2 Flow (L/min): 2        PHYSICAL EXAM:              MEDICATION:  MEDICATIONS  (STANDING):  allopurinol 50 milliGRAM(s) Oral daily  chlorhexidine 2% Cloths 1 Application(s) Topical <User Schedule>  DAPTOmycin IVPB 550 milliGRAM(s) IV Intermittent every 24 hours  folic acid 1 milliGRAM(s) Oral every 24 hours  HYDROmorphone PCA (1 mG/mL) 30 milliLiter(s) PCA Continuous PCA Continuous  pantoprazole    Tablet 40 milliGRAM(s) Oral before breakfast  polyethylene glycol 3350 17 Gram(s) Oral daily  senna 2 Tablet(s) Oral at bedtime  sodium bicarbonate  Infusion 0.087 mEq/kG/Hr (100 mL/Hr) IV Continuous <Continuous>    MEDICATIONS  (PRN):  acetaminophen     Tablet .. 650 milliGRAM(s) Oral every 6 hours PRN Temp greater or equal to 38C (100.4F), Mild Pain (1 - 3)  diphenhydrAMINE 25 milliGRAM(s) Oral every 6 hours PRN Rash and/or Itching  melatonin 3 milliGRAM(s) Oral at bedtime PRN Insomnia  naloxone Injectable 0.1 milliGRAM(s) IV Push every 3 minutes PRN For ANY of the following changes in patient status:  A. RR LESS THAN 10 breaths per minute, B. Oxygen saturation LESS THAN 90%, C. Sedation score of 6  ondansetron    Tablet 4 milliGRAM(s) Oral every 6 hours PRN Nausea and/or Vomiting  ondansetron Injectable 4 milliGRAM(s) IV Push every 6 hours PRN Nausea            LABORATORY:                          5.8    11.63 )-----------( 295      ( 05 Nov 2024 08:01 )             16.1     11-05    137  |  104  |  37[H]  ----------------------------<  96  4.4   |  20[L]  |  2.24[H]    Ca    8.5      05 Nov 2024 08:01  Phos  4.2     11-05  Mg     1.50     11-05    TPro  7.1  /  Alb  3.7  /  TBili  5.5[H]  /  DBili  x   /  AST  110[H]  /  ALT  44[H]  /  AlkPhos  156[H]  11-05      Urinalysis Basic - ( 05 Nov 2024 08:01 )    Color: x / Appearance: x / SG: x / pH: x  Gluc: 96 mg/dL / Ketone: x  / Bili: x / Urobili: x   Blood: x / Protein: x / Nitrite: x   Leuk Esterase: x / RBC: x / WBC x   Sq Epi: x / Non Sq Epi: x / Bacteria: x                         [facility] Division of Hospital Medicine  Shiraz Soliz) MD Srinivasa  Pager [phone]    SUBJECTIVE:  Chief complaint: VOC, anemia.    Pt was seen and evaluated at bedside this AM. No o/n events. Denies any SOB/NV. Reports ongoing pain, but slightly better compared to yesterday. Leg swelling improved.       ROS: All systems negative except as noted.      Vital Signs Last 24 Hrs  T(C): 36.7 (05 Nov 2024 13:30), Max: 36.9 (04 Nov 2024 18:45)  T(F): 98.1 (05 Nov 2024 13:30), Max: 98.5 (04 Nov 2024 18:45)  HR: 101 (05 Nov 2024 13:30) (84 - 101)  BP: 153/93 (05 Nov 2024 13:30) (141/87 - 163/97)  BP(mean): --  RR: 18 (05 Nov 2024 13:30) (17 - 18)  SpO2: 100% (05 Nov 2024 13:30) (97% - 100%)    Parameters below as of 05 Nov 2024 13:30  Patient On (Oxygen Delivery Method): nasal cannula  O2 Flow (L/min): 2        PHYSICAL EXAM:  Gen- in bed, NAD  Resp- CTAB, good effort  CVs- RRR, S1S2, no g/r/m. +BLE edema R>L  GI- Soft abd, NT, ND, +BSx4  Ext- No C/C.   Neuro- CN II-XII intact. Speech fluent/face symmetric.  Psych- AAOx4.      MEDICATION:  MEDICATIONS  (STANDING):  allopurinol 50 milliGRAM(s) Oral daily  chlorhexidine 2% Cloths 1 Application(s) Topical <User Schedule>  DAPTOmycin IVPB 550 milliGRAM(s) IV Intermittent every 24 hours  folic acid 1 milliGRAM(s) Oral every 24 hours  HYDROmorphone PCA (1 mG/mL) 30 milliLiter(s) PCA Continuous PCA Continuous  pantoprazole    Tablet 40 milliGRAM(s) Oral before breakfast  polyethylene glycol 3350 17 Gram(s) Oral daily  senna 2 Tablet(s) Oral at bedtime  sodium bicarbonate  Infusion 0.087 mEq/kG/Hr (100 mL/Hr) IV Continuous <Continuous>    MEDICATIONS  (PRN):  acetaminophen     Tablet .. 650 milliGRAM(s) Oral every 6 hours PRN Temp greater or equal to 38C (100.4F), Mild Pain (1 - 3)  diphenhydrAMINE 25 milliGRAM(s) Oral every 6 hours PRN Rash and/or Itching  melatonin 3 milliGRAM(s) Oral at bedtime PRN Insomnia  naloxone Injectable 0.1 milliGRAM(s) IV Push every 3 minutes PRN For ANY of the following changes in patient status:  A. RR LESS THAN 10 breaths per minute, B. Oxygen saturation LESS THAN 90%, C. Sedation score of 6  ondansetron    Tablet 4 milliGRAM(s) Oral every 6 hours PRN Nausea and/or Vomiting  ondansetron Injectable 4 milliGRAM(s) IV Push every 6 hours PRN Nausea            LABORATORY:                          5.8    11.63 )-----------( 295      ( 05 Nov 2024 08:01 )             16.1     11-05    137  |  104  |  37[H]  ----------------------------<  96  4.4   |  20[L]  |  2.24[H]    Ca    8.5      05 Nov 2024 08:01  Phos  4.2     11-05  Mg     1.50     11-05    TPro  7.1  /  Alb  3.7  /  TBili  5.5[H]  /  DBili  x   /  AST  110[H]  /  ALT  44[H]  /  AlkPhos  156[H]  11-05      Urinalysis Basic - ( 05 Nov 2024 08:01 )    Color: x / Appearance: x / SG: x / pH: x  Gluc: 96 mg/dL / Ketone: x  / Bili: x / Urobili: x   Blood: x / Protein: x / Nitrite: x   Leuk Esterase: x / RBC: x / WBC x   Sq Epi: x / Non Sq Epi: x / Bacteria: x

## 2024-11-05 NOTE — PROGRESS NOTE ADULT - PROBLEM SELECTOR PLAN 7
VTE PPx: encourage ambulation - doubt bleeding -- if stable after transfusion - would consider starting chem ppx given elevated risk.       Dispo- Remains acute -pending renal improvement. VTE PPx: encourage ambulation - doubt bleeding -- if stable after transfusion - would consider starting chem ppx given elevated risk.       Dispo- Remains acute -pending renal improvement, pain control.

## 2024-11-05 NOTE — PROVIDER CONTACT NOTE (CRITICAL VALUE NOTIFICATION) - ACTION/TREATMENT ORDERED:
No interventions at this time as hematology goal is for pt to be at 5.5 or above. Continue to monitor.

## 2024-11-05 NOTE — PROGRESS NOTE ADULT - PROBLEM SELECTOR PLAN 5
- Upr/cr ratio - 3.2. Multifactorial -- secondary to VOC, supratherapeutic vanco. Concern for component of ATN.  - Cr continues to worsen - up to 2.31.  -Renal US - normal  - Avoid nephrotoxins.  - Trend labs.  -APpreciate neph recs.    #Metabolic acidosis   -In setting of MATA  -Start bicarb drip as per nephro. - Upr/cr ratio - 3.2. Multifactorial -- secondary to VOC, supratherapeutic vanco. Concern for component of ATN.  - Cr continues to worsen - up to 2.31.  -Renal US - normal  - Avoid nephrotoxins.  - Trend labs.  -Appreciate neph recs.    #Metabolic acidosis   -In setting of MATA  -Cont bicarb drip as per nephro - will reduce to 75cc/h (given edema)

## 2024-11-05 NOTE — PROGRESS NOTE ADULT - NS ATTEND AMEND GEN_ALL_CORE FT
Agree with above assessment and plan.     Consistently around hemoglobin 5-6. Typically when he is <5.5 receiving transfusion. Continue supportive measures as above and transfuse to keep hemoglobin >5.5. Outpatient follow up with Dr Jordan at [facility]. Iron overload on chelation to continue. Hold oxbryta as has been removed from market. I agree with the NP assessment and plan

## 2024-11-05 NOTE — PROGRESS NOTE ADULT - PROBLEM SELECTOR PLAN 1
At the time of admission pt had Scr of 1.55. His baseline is ~0.8. MATA initially improved to his baseline Scr with blood transfusions and IV fluids. Pt got CT with IV contrast on 10/25/24. But Scr remained around his baseline till 10/31. Pt has mediport for recurrent transfusions. He was later found to have MRSA bacteremia. On IV antibiotics. Acute chest syndrome was ruled out. Pt was given Toradol on 10/30 and Scr started to rise 1.36 on 10/31 and worsened to  2.06 on 11/2. Scr is elevated but stable at 2.24 (11/5). Pt is on IV fluids and IV antibiotics. UA was not done but UPCR showed 3.1 (10/25). USG: Right kidney: 12.4 cm. Left kidney: 11.3 cm. Repeat UPCR (11/4) - UPCR 3.2. Large blood noted. MATA in setting of infection related ATN, sickle cell disease and NSAID use. Monitor I/Os and labs. Avoid nephrotoxins/ NSIADs. Dose medications as per eGFR.

## 2024-11-06 LAB
ALBUMIN SERPL ELPH-MCNC: 3.7 G/DL — SIGNIFICANT CHANGE UP (ref 3.3–5)
ALP SERPL-CCNC: 158 U/L — HIGH (ref 40–120)
ALT FLD-CCNC: 45 U/L — HIGH (ref 4–41)
ANION GAP SERPL CALC-SCNC: 16 MMOL/L — HIGH (ref 7–14)
AST SERPL-CCNC: 138 U/L — HIGH (ref 4–40)
BASOPHILS # BLD AUTO: 0.21 K/UL — HIGH (ref 0–0.2)
BASOPHILS NFR BLD AUTO: 1.8 % — SIGNIFICANT CHANGE UP (ref 0–2)
BILIRUB SERPL-MCNC: 7 MG/DL — HIGH (ref 0.2–1.2)
BUN SERPL-MCNC: 38 MG/DL — HIGH (ref 7–23)
CALCIUM SERPL-MCNC: 8.6 MG/DL — SIGNIFICANT CHANGE UP (ref 8.4–10.5)
CHLORIDE SERPL-SCNC: 103 MMOL/L — SIGNIFICANT CHANGE UP (ref 98–107)
CK SERPL-CCNC: 48 U/L — SIGNIFICANT CHANGE UP (ref 30–200)
CO2 SERPL-SCNC: 18 MMOL/L — LOW (ref 22–31)
CREAT SERPL-MCNC: 2.18 MG/DL — HIGH (ref 0.5–1.3)
EGFR: 37 ML/MIN/1.73M2 — LOW
EOSINOPHIL # BLD AUTO: 0.84 K/UL — HIGH (ref 0–0.5)
EOSINOPHIL NFR BLD AUTO: 7.4 % — HIGH (ref 0–6)
GLUCOSE SERPL-MCNC: 107 MG/DL — HIGH (ref 70–99)
HAPTOGLOB SERPL-MCNC: <20 MG/DL — SIGNIFICANT CHANGE UP (ref 34–200)
HCT VFR BLD CALC: 16 % — CRITICAL LOW (ref 39–50)
HGB BLD-MCNC: 5.7 G/DL — CRITICAL LOW (ref 13–17)
IANC: 5.32 K/UL — SIGNIFICANT CHANGE UP (ref 1.8–7.4)
IMM GRANULOCYTES NFR BLD AUTO: 0.6 % — SIGNIFICANT CHANGE UP (ref 0–0.9)
LDH SERPL L TO P-CCNC: 1422 U/L — HIGH (ref 135–225)
LYMPHOCYTES # BLD AUTO: 29.4 % — SIGNIFICANT CHANGE UP (ref 13–44)
LYMPHOCYTES # BLD AUTO: 3.35 K/UL — HIGH (ref 1–3.3)
MAGNESIUM SERPL-MCNC: 1.5 MG/DL — LOW (ref 1.6–2.6)
MCHC RBC-ENTMCNC: 29.4 PG — SIGNIFICANT CHANGE UP (ref 27–34)
MCHC RBC-ENTMCNC: 35.6 G/DL — SIGNIFICANT CHANGE UP (ref 32–36)
MCV RBC AUTO: 82.5 FL — SIGNIFICANT CHANGE UP (ref 80–100)
MONOCYTES # BLD AUTO: 1.62 K/UL — HIGH (ref 0–0.9)
MONOCYTES NFR BLD AUTO: 14.2 % — HIGH (ref 2–14)
NEUTROPHILS # BLD AUTO: 5.32 K/UL — SIGNIFICANT CHANGE UP (ref 1.8–7.4)
NEUTROPHILS NFR BLD AUTO: 46.6 % — SIGNIFICANT CHANGE UP (ref 43–77)
NRBC # BLD: 0 /100 WBCS — SIGNIFICANT CHANGE UP (ref 0–0)
NRBC # FLD: 0.02 K/UL — HIGH (ref 0–0)
PHOSPHATE SERPL-MCNC: 4 MG/DL — SIGNIFICANT CHANGE UP (ref 2.5–4.5)
PLATELET # BLD AUTO: 357 K/UL — SIGNIFICANT CHANGE UP (ref 150–400)
POTASSIUM SERPL-MCNC: 4.8 MMOL/L — SIGNIFICANT CHANGE UP (ref 3.5–5.3)
POTASSIUM SERPL-SCNC: 4.8 MMOL/L — SIGNIFICANT CHANGE UP (ref 3.5–5.3)
PROT SERPL-MCNC: 7 G/DL — SIGNIFICANT CHANGE UP (ref 6–8.3)
RBC # BLD: 1.94 M/UL — LOW (ref 4.2–5.8)
RBC # BLD: 1.94 M/UL — LOW (ref 4.2–5.8)
RBC # FLD: 21.2 % — HIGH (ref 10.3–14.5)
RETICS #: 122.2 K/UL — SIGNIFICANT CHANGE UP (ref 25–125)
RETICS/RBC NFR: 6.3 % — HIGH (ref 0.5–2.5)
SODIUM SERPL-SCNC: 137 MMOL/L — SIGNIFICANT CHANGE UP (ref 135–145)
WBC # BLD: 11.41 K/UL — HIGH (ref 3.8–10.5)
WBC # FLD AUTO: 11.41 K/UL — HIGH (ref 3.8–10.5)

## 2024-11-06 PROCEDURE — 99233 SBSQ HOSP IP/OBS HIGH 50: CPT | Mod: GC

## 2024-11-06 PROCEDURE — 99233 SBSQ HOSP IP/OBS HIGH 50: CPT

## 2024-11-06 RX ADMIN — CHLORHEXIDINE GLUCONATE 1 APPLICATION(S): 40 SOLUTION TOPICAL at 05:25

## 2024-11-06 RX ADMIN — DAPTOMYCIN 122 MILLIGRAM(S): 500 INJECTION, POWDER, LYOPHILIZED, FOR SOLUTION INTRAVENOUS at 18:46

## 2024-11-06 RX ADMIN — Medication 100 MEQ/KG/HR: at 05:19

## 2024-11-06 RX ADMIN — Medication 30 MILLILITER(S): at 20:17

## 2024-11-06 RX ADMIN — Medication 2 TABLET(S): at 21:38

## 2024-11-06 RX ADMIN — Medication 50 MILLIGRAM(S): at 13:12

## 2024-11-06 RX ADMIN — Medication 40 MILLIGRAM(S): at 09:09

## 2024-11-06 RX ADMIN — Medication 30 MILLILITER(S): at 20:20

## 2024-11-06 RX ADMIN — FOLIC ACID 1 MILLIGRAM(S): 1 TABLET ORAL at 13:12

## 2024-11-06 RX ADMIN — PANTOPRAZOLE SODIUM 40 MILLIGRAM(S): 40 TABLET, DELAYED RELEASE ORAL at 05:21

## 2024-11-06 RX ADMIN — Medication 30 MILLILITER(S): at 07:37

## 2024-11-06 RX ADMIN — Medication 30 MILLILITER(S): at 19:19

## 2024-11-06 RX ADMIN — Medication 30 MILLILITER(S): at 13:14

## 2024-11-06 RX ADMIN — Medication 30 MILLILITER(S): at 05:16

## 2024-11-06 NOTE — PROGRESS NOTE ADULT - PROBLEM SELECTOR PLAN 7
VTE PPx: encourage ambulation - doubt bleeding -- if stable after transfusion - would consider starting chem ppx given elevated risk.       Dispo- Remains acute -pending renal improvement, pain control.

## 2024-11-06 NOTE — PROGRESS NOTE ADULT - PROBLEM SELECTOR PLAN 2
- Hb 5.2 on presentation, baseline Hb 6-6.5  hg stable 5.7  - Per chart review: s/p 1 units of pRBCs on 10/25, 10/29, 11/4  - caution with excessive transfusion in setting of hemochromatosis – c/w home chelating agents   - Continue PCA = 0.5mg demand /6min lockout/20mg (4h limit) - NO changes today.  - Monitor pain - trend SC labs.  -Heme recs appreciated.

## 2024-11-06 NOTE — PROGRESS NOTE ADULT - SUBJECTIVE AND OBJECTIVE BOX
[facility] Division of Hospital Medicine  Martine Iglesias MD  Pager [phone]    Patient is a 44y old  Male who presents with a chief complaint of sickle cell crisis       SUBJECTIVE / OVERNIGHT EVENTS: reports L foot pain that feels like his gout; sickle cell pain is improving;       MEDICATIONS  (STANDING):  allopurinol 50 milliGRAM(s) Oral daily  chlorhexidine 2% Cloths 1 Application(s) Topical <User Schedule>  DAPTOmycin IVPB 550 milliGRAM(s) IV Intermittent every 24 hours  Deferasirox (Jadenu) 360mg tablets 2 Tablet(s)   Oral daily  folic acid 1 milliGRAM(s) Oral every 24 hours  HYDROmorphone PCA (1 mG/mL) 30 milliLiter(s) PCA Continuous PCA Continuous  pantoprazole    Tablet 40 milliGRAM(s) Oral before breakfast  polyethylene glycol 3350 17 Gram(s) Oral daily  predniSONE   Tablet 40 milliGRAM(s) Oral daily  senna 2 Tablet(s) Oral at bedtime  sodium chloride 0.45%. 1000 milliLiter(s) (30 mL/Hr) IV Continuous <Continuous>    MEDICATIONS  (PRN):  acetaminophen     Tablet .. 650 milliGRAM(s) Oral every 6 hours PRN Temp greater or equal to 38C (100.4F), Mild Pain (1 - 3)  diphenhydrAMINE 25 milliGRAM(s) Oral every 6 hours PRN Rash and/or Itching  melatonin 3 milliGRAM(s) Oral at bedtime PRN Insomnia  naloxone Injectable 0.1 milliGRAM(s) IV Push every 3 minutes PRN For ANY of the following changes in patient status:  A. RR LESS THAN 10 breaths per minute, B. Oxygen saturation LESS THAN 90%, C. Sedation score of 6  ondansetron    Tablet 4 milliGRAM(s) Oral every 6 hours PRN Nausea and/or Vomiting  ondansetron Injectable 4 milliGRAM(s) IV Push every 6 hours PRN Nausea      PHYSICAL EXAM:  Vital Signs Last 24 Hrs  T(F): 97.5 (06 Nov 2024 10:00), Max: 98.4 (05 Nov 2024 21:30)  HR: 114 (06 Nov 2024 10:00) (101 - 116)  BP: 148/89 (06 Nov 2024 10:00) (148/89 - 163/97)  RR: 18 (06 Nov 2024 10:00) (16 - 18)  SpO2: 93% (06 Nov 2024 10:00) (93% - 100%)    Parameters below as of 06 Nov 2024 05:24  Patient On (Oxygen Delivery Method): nasal cannula  O2 Flow (L/min): 2      CONSTITUTIONAL: NAD, appears comfortable  EYES: PERRLA; conjunctiva and sclera clear  ENMT: Moist oral mucosa; normal dentition  RESPIRATORY: Normal respiratory effort; lungs are clear to auscultation bilaterally  CARDIOVASCULAR: Regular rate and rhythm; ++++ lower extremity edema to waist  ABDOMEN: Nontender to palpation, normoactive bowel sounds  MUSCULOSKELETAL: no clubbing or cyanosis of digits; no joint swelling or tenderness to palpation; tender L foot, lat, dorsum  PSYCH: A+O to person, place, and time; affect appropriate  NEUROLOGY: CN 2-12 are intact and symmetric; no gross sensory deficits   SKIN: No rashes; no palpable lesions    LABS:                        5.7    11.41 )-----------( 357      ( 06 Nov 2024 06:42 )             16.0     11-06    137  |  103  |  38[H]  ----------------------------<  107[H]  4.8   |  18[L]  |  2.18[H]    Ca    8.6      06 Nov 2024 06:42  Phos  4.0     11-06  Mg     1.50     11-06    TPro  7.0  /  Alb  3.7  /  TBili  7.0[H]  /  DBili  x   /  AST  138[H]  /  ALT  45[H]  /  AlkPhos  158[H]  11-06

## 2024-11-06 NOTE — PROGRESS NOTE ADULT - PROBLEM SELECTOR PLAN 5
- Upr/cr ratio - 3.2. Multifactorial -- secondary to VOC, supratherapeutic vanco. Concern for component of ATN.  -Renal US - normal  - Avoid nephrotoxins.  - Trend labs.  -Appreciate neph recs.  improving    #Metabolic acidosis   -In setting of MATA  bicarb now stopped, cont to monitor

## 2024-11-06 NOTE — PROGRESS NOTE ADULT - SUBJECTIVE AND OBJECTIVE BOX
[facility] Division of Kidney Diseases & Hypertension  FOLLOW UP NOTE  [phone]--------------------------------------------------------------------------------  Chief Complaint: MATA    24 hour events/subjective: Pt was seen and examined at the bedside. Reports feeling well. Still has pedal edema and mild shortness of breath. Pt is on IV fluids. He has severe pain in the L foot. ? Gout. Pt denies Constipation/ Diarrhea/ Nausea/ Vomiting/ abdominal pain/ chest pain/ tingling/ numbness.       PAST HISTORY  --------------------------------------------------------------------------------  No significant changes to PMH, PSH, FHx, SHx, unless otherwise noted    ALLERGIES & MEDICATIONS  --------------------------------------------------------------------------------  Allergies    ceftriaxone (Anaphylaxis)  piperacillin-tazobactam (Urticaria)  penicillin (Pruritus)  hydroxyurea (Other)    Intolerances      Standing Inpatient Medications  allopurinol 50 milliGRAM(s) Oral daily  chlorhexidine 2% Cloths 1 Application(s) Topical <User Schedule>  DAPTOmycin IVPB 550 milliGRAM(s) IV Intermittent every 24 hours  Deferasirox (Jadenu) 360mg tablets 2 Tablet(s)   Oral daily  folic acid 1 milliGRAM(s) Oral every 24 hours  HYDROmorphone PCA (1 mG/mL) 30 milliLiter(s) PCA Continuous PCA Continuous  pantoprazole    Tablet 40 milliGRAM(s) Oral before breakfast  polyethylene glycol 3350 17 Gram(s) Oral daily  predniSONE   Tablet 40 milliGRAM(s) Oral daily  senna 2 Tablet(s) Oral at bedtime  sodium chloride 0.45%. 1000 milliLiter(s) IV Continuous <Continuous>    PRN Inpatient Medications  acetaminophen     Tablet .. 650 milliGRAM(s) Oral every 6 hours PRN  diphenhydrAMINE 25 milliGRAM(s) Oral every 6 hours PRN  melatonin 3 milliGRAM(s) Oral at bedtime PRN  naloxone Injectable 0.1 milliGRAM(s) IV Push every 3 minutes PRN  ondansetron    Tablet 4 milliGRAM(s) Oral every 6 hours PRN  ondansetron Injectable 4 milliGRAM(s) IV Push every 6 hours PRN      REVIEW OF SYSTEMS  --------------------------------------------------------------------------------  Gen: In pain  Skin: No rashes  Head/Eyes/Ears: Normal hearing  Respiratory: On supplemental oxygen  CV: No chest pain  GI: No abdominal pain, diarrhea  : No dysuria, hematuria  MSK: + B/L edema, L foot pain +  Heme: No easy bruising or bleeding  Psych: No significant depression      All other systems were reviewed and are negative, except as noted.      All other systems were reviewed and are negative, except as noted.    VITALS/PHYSICAL EXAM  --------------------------------------------------------------------------------  T(C): 36.4 (11-06-24 @ 10:00), Max: 36.9 (11-05-24 @ 21:30)  HR: 114 (11-06-24 @ 10:00) (101 - 116)  BP: 148/89 (11-06-24 @ 10:00) (148/89 - 163/97)  RR: 18 (11-06-24 @ 10:00) (16 - 18)  SpO2: 93% (11-06-24 @ 10:00) (93% - 100%)  Wt(kg): --    Physical Exam:  Gen: On supplemental oxygen  HEENT: MMM  Pulm: Lower zone decreased breath sounds   CV: S1S2  Abd: Soft, +BS   Ext: Pitting LE edema B/L, L foot tenderness.  Neuro: Awake  Skin: Warm and dry  Vascular access: Mediport RIJ and peripheral IVs.       LABS/STUDIES  --------------------------------------------------------------------------------              5.7    11.41 >-----------<  357      [11-06-24 @ 06:42]              16.0     137  |  103  |  38  ----------------------------<  107      [11-06-24 @ 06:42]  4.8   |  18  |  2.18        Ca     8.6     [11-06-24 @ 06:42]      Mg     1.50     [11-06-24 @ 06:42]      Phos  4.0     [11-06-24 @ 06:42]    TPro  7.0  /  Alb  3.7  /  TBili  7.0  /  DBili  x   /  AST  138  /  ALT  45  /  AlkPhos  158  [11-06-24 @ 06:42]        LDH 1422      [11-06-24 @ 06:42]    Creatinine Trend:  SCr 2.18 [11-06 @ 06:42]  SCr 2.24 [11-05 @ 08:01]  SCr 2.31 [11-04 @ 10:58]  SCr 2.06 [11-02 @ 21:21]  SCr 1.75 [11-01 @ 15:33]    Urine Creatinine 64      [11-04-24 @ 00:31]  Urine Protein 202      [11-04-24 @ 00:31]  Urine Sodium 56      [11-04-24 @ 00:31]  Urine Urea Nitrogen 381.1      [11-04-24 @ 00:31]  Urine Potassium 16.3      [11-04-24 @ 00:31]

## 2024-11-06 NOTE — PROGRESS NOTE ADULT - ASSESSMENT
Patient is a 45yo M with PMH significant for sickle cell anemia (port in place) , cirrhosis, hemochromatosis, and gout who presented with a low hemoglobin count, admitted with sickle cell crisis. Found  to have MRSA bacteremia on vancomycin. Course c/b anemia requiring transfusions and MATA. Nephro/ID on board.

## 2024-11-06 NOTE — PROGRESS NOTE ADULT - PROBLEM SELECTOR PLAN 1
At the time of admission pt had Scr of 1.55. His baseline is ~0.8. MATA initially improved to his baseline Scr with blood transfusions and IV fluids. Pt got CT with IV contrast on 10/25/24. But Scr remained around his baseline till 10/31. Pt has medi port for recurrent transfusions. He was later found to have MRSA bacteremia. On IV antibiotics. Acute chest syndrome was ruled out. Pt was given Toradol on 10/30 and Scr started to rise 1.36 on 10/31 and worsened to 2.06 on 11/2. Scr is elevated but stable at 2.18 (11/6). Pt is on IV fluids and IV antibiotics. UA was not done but UPCR showed 3.1 (10/25). USG: Right kidney: 12.4 cm. Left kidney: 11.3 cm. Repeat UPCR (11/4) - UPCR 3.2. Large blood noted. MATA in setting of infection related ATN, sickle cell disease and NSAID use. Monitor I/Os and labs.   PLAN:  Discontinue IV fluids.   Avoid nephrotoxins/ NSIADs. Dose medications as per eGFR.  Rest of the management of as per primary team.

## 2024-11-07 LAB
ALBUMIN SERPL ELPH-MCNC: 3.7 G/DL — SIGNIFICANT CHANGE UP (ref 3.3–5)
ALP SERPL-CCNC: 142 U/L — HIGH (ref 40–120)
ALT FLD-CCNC: 57 U/L — HIGH (ref 4–41)
ANION GAP SERPL CALC-SCNC: 18 MMOL/L — HIGH (ref 7–14)
AST SERPL-CCNC: 145 U/L — HIGH (ref 4–40)
BILIRUB SERPL-MCNC: 7.1 MG/DL — HIGH (ref 0.2–1.2)
BLD GP AB SCN SERPL QL: NEGATIVE — SIGNIFICANT CHANGE UP
BUN SERPL-MCNC: 43 MG/DL — HIGH (ref 7–23)
CALCIUM SERPL-MCNC: 8.8 MG/DL — SIGNIFICANT CHANGE UP (ref 8.4–10.5)
CHLORIDE SERPL-SCNC: 101 MMOL/L — SIGNIFICANT CHANGE UP (ref 98–107)
CO2 SERPL-SCNC: 18 MMOL/L — LOW (ref 22–31)
CREAT SERPL-MCNC: 2.19 MG/DL — HIGH (ref 0.5–1.3)
EGFR: 37 ML/MIN/1.73M2 — LOW
GLUCOSE SERPL-MCNC: 142 MG/DL — HIGH (ref 70–99)
HCT VFR BLD CALC: 13.2 % — CRITICAL LOW (ref 39–50)
HGB BLD-MCNC: 4.8 G/DL — CRITICAL LOW (ref 13–17)
LDH SERPL L TO P-CCNC: 1217 U/L — HIGH (ref 135–225)
MAGNESIUM SERPL-MCNC: 1.4 MG/DL — LOW (ref 1.6–2.6)
MCHC RBC-ENTMCNC: 29.8 PG — SIGNIFICANT CHANGE UP (ref 27–34)
MCHC RBC-ENTMCNC: 36.4 G/DL — HIGH (ref 32–36)
MCV RBC AUTO: 82 FL — SIGNIFICANT CHANGE UP (ref 80–100)
NRBC # BLD: 0 /100 WBCS — SIGNIFICANT CHANGE UP (ref 0–0)
NRBC # FLD: 0.06 K/UL — HIGH (ref 0–0)
PHOSPHATE SERPL-MCNC: 3.1 MG/DL — SIGNIFICANT CHANGE UP (ref 2.5–4.5)
PLATELET # BLD AUTO: 146 K/UL — LOW (ref 150–400)
POTASSIUM SERPL-MCNC: 4.3 MMOL/L — SIGNIFICANT CHANGE UP (ref 3.5–5.3)
POTASSIUM SERPL-SCNC: 4.3 MMOL/L — SIGNIFICANT CHANGE UP (ref 3.5–5.3)
PROT SERPL-MCNC: 7 G/DL — SIGNIFICANT CHANGE UP (ref 6–8.3)
RBC # BLD: 1.61 M/UL — LOW (ref 4.2–5.8)
RBC # BLD: 1.61 M/UL — LOW (ref 4.2–5.8)
RBC # FLD: 20.4 % — HIGH (ref 10.3–14.5)
RETICS #: 144.7 K/UL — HIGH (ref 25–125)
RETICS/RBC NFR: 9 % — HIGH (ref 0.5–2.5)
RH IG SCN BLD-IMP: POSITIVE — SIGNIFICANT CHANGE UP
SODIUM SERPL-SCNC: 137 MMOL/L — SIGNIFICANT CHANGE UP (ref 135–145)
WBC # BLD: 8.77 K/UL — SIGNIFICANT CHANGE UP (ref 3.8–10.5)
WBC # FLD AUTO: 8.77 K/UL — SIGNIFICANT CHANGE UP (ref 3.8–10.5)

## 2024-11-07 PROCEDURE — 99233 SBSQ HOSP IP/OBS HIGH 50: CPT | Mod: GC

## 2024-11-07 PROCEDURE — 99232 SBSQ HOSP IP/OBS MODERATE 35: CPT

## 2024-11-07 RX ORDER — DIPHENHYDRAMINE HCL 12.5MG/5ML
25 ELIXIR ORAL ONCE
Refills: 0 | Status: COMPLETED | OUTPATIENT
Start: 2024-11-07 | End: 2024-11-07

## 2024-11-07 RX ORDER — FUROSEMIDE 40 MG
20 TABLET ORAL
Refills: 0 | Status: DISCONTINUED | OUTPATIENT
Start: 2024-11-07 | End: 2024-11-11

## 2024-11-07 RX ORDER — HYDROMORPHONE HCL/0.9% NACL/PF 6 MG/30 ML
30 PATIENT CONTROLLED ANALGESIA SYRINGE INTRAVENOUS
Refills: 0 | Status: DISCONTINUED | OUTPATIENT
Start: 2024-11-07 | End: 2024-11-09

## 2024-11-07 RX ADMIN — Medication 25 MILLIGRAM(S): at 20:43

## 2024-11-07 RX ADMIN — Medication 650 MILLIGRAM(S): at 21:42

## 2024-11-07 RX ADMIN — Medication 650 MILLIGRAM(S): at 20:42

## 2024-11-07 RX ADMIN — DAPTOMYCIN 122 MILLIGRAM(S): 500 INJECTION, POWDER, LYOPHILIZED, FOR SOLUTION INTRAVENOUS at 17:40

## 2024-11-07 RX ADMIN — Medication 2 TABLET(S): at 21:25

## 2024-11-07 RX ADMIN — CHLORHEXIDINE GLUCONATE 1 APPLICATION(S): 40 SOLUTION TOPICAL at 07:40

## 2024-11-07 RX ADMIN — Medication 30 MILLILITER(S): at 19:39

## 2024-11-07 RX ADMIN — Medication 20 MILLIGRAM(S): at 13:41

## 2024-11-07 RX ADMIN — Medication 30 MILLILITER(S): at 13:36

## 2024-11-07 RX ADMIN — Medication 30 MILLILITER(S): at 19:38

## 2024-11-07 RX ADMIN — PANTOPRAZOLE SODIUM 40 MILLIGRAM(S): 40 TABLET, DELAYED RELEASE ORAL at 05:27

## 2024-11-07 RX ADMIN — Medication 30 MILLILITER(S): at 07:39

## 2024-11-07 RX ADMIN — Medication 40 MILLIGRAM(S): at 05:27

## 2024-11-07 NOTE — PROGRESS NOTE ADULT - SUBJECTIVE AND OBJECTIVE BOX
[facility] Division of Hospital Medicine  Martine Iglesias MD  Pager [phone]    Patient is a 44y old  Male who presents with a chief complaint of sickle cell crisis       SUBJECTIVE / OVERNIGHT EVENTS: foot feeling better today but LE edema to waste; IV fluid stopped; d/w nephro, will start diuretics      MEDICATIONS  (STANDING):  allopurinol 50 milliGRAM(s) Oral daily  chlorhexidine 2% Cloths 1 Application(s) Topical <User Schedule>  DAPTOmycin IVPB 550 milliGRAM(s) IV Intermittent every 24 hours  Deferasirox (Jadenu) 360mg tablets 2 Tablet(s) 2 Tablet(s) Oral daily  folic acid 1 milliGRAM(s) Oral every 24 hours  furosemide   Injectable 20 milliGRAM(s) IV Push two times a day  HYDROmorphone PCA (1 mG/mL) 30 milliLiter(s) PCA Continuous PCA Continuous  pantoprazole    Tablet 40 milliGRAM(s) Oral before breakfast  polyethylene glycol 3350 17 Gram(s) Oral daily  predniSONE   Tablet 40 milliGRAM(s) Oral daily  senna 2 Tablet(s) Oral at bedtime  sodium chloride 0.45%. 1000 milliLiter(s) (30 mL/Hr) IV Continuous <Continuous>    MEDICATIONS  (PRN):  acetaminophen     Tablet .. 650 milliGRAM(s) Oral every 6 hours PRN Temp greater or equal to 38C (100.4F), Mild Pain (1 - 3)  diphenhydrAMINE 25 milliGRAM(s) Oral every 6 hours PRN Rash and/or Itching  melatonin 3 milliGRAM(s) Oral at bedtime PRN Insomnia  naloxone Injectable 0.1 milliGRAM(s) IV Push every 3 minutes PRN For ANY of the following changes in patient status:  A. RR LESS THAN 10 breaths per minute, B. Oxygen saturation LESS THAN 90%, C. Sedation score of 6  ondansetron    Tablet 4 milliGRAM(s) Oral every 6 hours PRN Nausea and/or Vomiting  ondansetron Injectable 4 milliGRAM(s) IV Push every 6 hours PRN Nausea      PHYSICAL EXAM:  Vital Signs Last 24 Hrs  T(F): 97.5 (07 Nov 2024 10:25), Max: 98.8 (07 Nov 2024 02:08)  HR: 87 (07 Nov 2024 10:25) (84 - 105)  BP: 138/81 (07 Nov 2024 10:25) (128/79 - 175/86)  RR: 17 (07 Nov 2024 10:25) (17 - 18)  SpO2: 100% (07 Nov 2024 10:25) (95% - 100%)    Parameters below as of 07 Nov 2024 06:00  Patient On (Oxygen Delivery Method): nasal cannula  O2 Flow (L/min): 2      CONSTITUTIONAL: NAD, appears comfortable  EYES: PERRLA; conjunctiva and scleral icterus  ENMT: Moist oral mucosa; normal dentition  RESPIRATORY: Normal respiratory effort; lungs are clear to auscultation bilaterally  CARDIOVASCULAR: Regular rate and rhythm; +++ lower extremity edema to waste  ABDOMEN: Nontender to palpation, normoactive bowel sounds  MUSCULOSKELETAL:  no clubbing or cyanosis of digits; no joint swelling or tenderness to palpation  PSYCH: A+O to person, place, and time; affect appropriate  NEUROLOGY: CN 2-12 are intact and symmetric; no gross sensory deficits   SKIN: No rashes; no palpable lesions    LABS:

## 2024-11-07 NOTE — PROGRESS NOTE ADULT - NS ATTEND AMEND GEN_ALL_CORE FT
Agree with above assessment and plan.   Likely renal dysfunction contributing to anemia as well as sickle cell. Continue supportive measures. Pediatric tubes and limit blood draws.

## 2024-11-07 NOTE — PROGRESS NOTE ADULT - SUBJECTIVE AND OBJECTIVE BOX
[facility] Division of Kidney Diseases & Hypertension  FOLLOW UP NOTE  [phone]--------------------------------------------------------------------------------  Chief Complaint: MATA, Sickle cell crisis.     24 hour events/subjective: Pt was seen and examined earlier today. No acute overnight events. His foot pain improved with Prednisone but he still has back pain.   Pt denies SOB/ Constipation/ Diarrhea/ Nausea/ Vomiting/ abdominal pain/ chest pain/ tingling/ numbness.         PAST HISTORY  --------------------------------------------------------------------------------  No significant changes to PMH, PSH, FHx, SHx, unless otherwise noted    ALLERGIES & MEDICATIONS  --------------------------------------------------------------------------------  Allergies    ceftriaxone (Anaphylaxis)  piperacillin-tazobactam (Urticaria)  penicillin (Pruritus)  hydroxyurea (Other)    Intolerances      Standing Inpatient Medications  allopurinol 50 milliGRAM(s) Oral daily  chlorhexidine 2% Cloths 1 Application(s) Topical <User Schedule>  DAPTOmycin IVPB 550 milliGRAM(s) IV Intermittent every 24 hours  Deferasirox (Jadenu) 360mg tablets 2 Tablet(s) 2 Tablet(s) Oral daily  folic acid 1 milliGRAM(s) Oral every 24 hours  furosemide   Injectable 20 milliGRAM(s) IV Push two times a day  HYDROmorphone PCA (1 mG/mL) 30 milliLiter(s) PCA Continuous PCA Continuous  pantoprazole    Tablet 40 milliGRAM(s) Oral before breakfast  polyethylene glycol 3350 17 Gram(s) Oral daily  predniSONE   Tablet 40 milliGRAM(s) Oral daily  senna 2 Tablet(s) Oral at bedtime  sodium chloride 0.45%. 1000 milliLiter(s) IV Continuous <Continuous>    PRN Inpatient Medications  acetaminophen     Tablet .. 650 milliGRAM(s) Oral every 6 hours PRN  diphenhydrAMINE 25 milliGRAM(s) Oral every 6 hours PRN  melatonin 3 milliGRAM(s) Oral at bedtime PRN  naloxone Injectable 0.1 milliGRAM(s) IV Push every 3 minutes PRN  ondansetron    Tablet 4 milliGRAM(s) Oral every 6 hours PRN  ondansetron Injectable 4 milliGRAM(s) IV Push every 6 hours PRN      REVIEW OF SYSTEMS  --------------------------------------------------------------------------------  Gen: In pain  Skin: No rashes  Head/Eyes/Ears: Normal hearing  Respiratory: On supplemental oxygen  CV: No chest pain  GI: No abdominal pain, diarrhea  : No dysuria, hematuria  MSK: + B/L edema, L foot pain + with interval improvement  Heme: No easy bruising or bleeding  Psych: No significant depression      All other systems were reviewed and are negative, except as noted.    VITALS/PHYSICAL EXAM  --------------------------------------------------------------------------------  T(C): 36.4 (11-07-24 @ 10:25), Max: 37.1 (11-07-24 @ 02:08)  HR: 87 (11-07-24 @ 10:25) (84 - 105)  BP: 138/81 (11-07-24 @ 10:25) (128/79 - 175/86)  RR: 17 (11-07-24 @ 10:25) (17 - 18)  SpO2: 100% (11-07-24 @ 10:25) (95% - 100%)  Wt(kg): --    Physical Exam:  Gen: On supplemental oxygen  HEENT: MMM  Pulm: Lower zone decreased breath sounds   CV: S1S2  Abd: Soft, +BS   Ext: Pitting LE edema B/L, L foot tenderness.  Neuro: Awake  Skin: Warm and dry  Vascular access: Mediport RIJ and peripheral IVs    LABS/STUDIES  --------------------------------------------------------------------------------              5.7    11.41 >-----------<  357      [11-06-24 @ 06:42]              16.0     137  |  103  |  38  ----------------------------<  107      [11-06-24 @ 06:42]  4.8   |  18  |  2.18        Ca     8.6     [11-06-24 @ 06:42]      Mg     1.50     [11-06-24 @ 06:42]      Phos  4.0     [11-06-24 @ 06:42]    TPro  7.0  /  Alb  3.7  /  TBili  7.0  /  DBili  x   /  AST  138  /  ALT  45  /  AlkPhos  158  [11-06-24 @ 06:42]    LDH 1422      [11-06-24 @ 06:42]    Creatinine Trend:  SCr 2.18 [11-06 @ 06:42]  SCr 2.24 [11-05 @ 08:01]  SCr 2.31 [11-04 @ 10:58]  SCr 2.06 [11-02 @ 21:21]  SCr 1.75 [11-01 @ 15:33]    Urine Creatinine 64      [11-04-24 @ 00:31]  Urine Protein 202      [11-04-24 @ 00:31]  Urine Sodium 56      [11-04-24 @ 00:31]  Urine Urea Nitrogen 381.1      [11-04-24 @ 00:31]  Urine Potassium 16.3      [11-04-24 @ 00:31]

## 2024-11-07 NOTE — PROVIDER CONTACT NOTE (OTHER) - RECOMMENDATIONS
Provider made aware.
Repeat lab work (re: 1- CMP + Mg + Phos / 2- CBC). Continued to monitor
Repeat lab work CBC and manual BP

## 2024-11-07 NOTE — PROGRESS NOTE ADULT - PROBLEM SELECTOR PLAN 1
- patient with Mediport for over 10 years, has good outpatient followup never had infection from port before   - 10.25  - BCx pos MRSA.  - Bcx 10.27 - NGTD.  - Plan for abx through 11/10.   - Was on vanco - but c/b worsening MATA. Now stopped and transitioned to IV daptomycin. thru 11/8

## 2024-11-07 NOTE — PROGRESS NOTE ADULT - PROBLEM SELECTOR PLAN 1
At the time of admission pt had Scr of 1.55. His baseline is ~0.8. MATA initially improved to his baseline Scr with blood transfusions and IV fluids. Pt got CT with IV contrast on 10/25/24. But Scr remained around his baseline till 10/31. Pt has medi port for recurrent transfusions. He was later found to have MRSA bacteremia. On IV antibiotics. Acute chest syndrome was ruled out. Pt was given Toradol on 10/30 and Scr started to rise 1.36 on 10/31 and worsened to 2.06 on 11/2. Scr is elevated but stable at 2.18 (11/6). No labs today. Pt is on IV antibiotics. Now on KVO IV fluids only.  UA was not done but UPCR showed 3.1 (10/25). USG: Right kidney: 12.4 cm. Left kidney: 11.3 cm. Repeat UPCR (11/4) - UPCR 3.2. Large blood noted. MATA in setting of infection related ATN, sickle cell disease and NSAID use.  PLAN:  Off IV fluids now. Only KVO.    Need I/Os.  Monitor I/Os and labs.   Avoid nephrotoxins/ NSIADs. Dose medications as per eGFR.  Rest of the management of as per primary team.

## 2024-11-07 NOTE — PROGRESS NOTE ADULT - ASSESSMENT
44 year old male with history of sickle cell disease admitted with CLABSI  No Hypoxia or fevers      Sickle cell disease  -undergoing care with Dr Jordan of [facility]  -Transfuse for Hgb < 5.5  -chronic hemolysis, Iron overload  -pls limit transfusions, maintain Hgb 5.5  -Continue Jadenu  -follow up Dr Jordan after discharge      Bacteremia  -likely in setting of mediport as source  - Bcx from 10/25 with MRSA in anaerobic and aerobic bottles   - currently on Daptomycin  -ID following, if positive cx persist, port to be removed, for now ok to use port per ID      Meghana Real NP  Hematology/Oncology  [facility]  [phone] (Office)  [phone] (Alt office)  Evenings and weekends please call MD on call or office

## 2024-11-07 NOTE — PROGRESS NOTE ADULT - SUBJECTIVE AND OBJECTIVE BOX
patient awake and alert,on 2L NC  ongoing anemia in setting of Vaso occlusive crisis and MATA  Recommend to continue transfusion for Hgb < 5.5 and to use mediport for blood draws and IV meds      MEDICATIONS  (STANDING):  allopurinol 50 milliGRAM(s) Oral daily  chlorhexidine 2% Cloths 1 Application(s) Topical <User Schedule>  DAPTOmycin IVPB 550 milliGRAM(s) IV Intermittent every 24 hours  Deferasirox (Jadenu) 360mg tablets 2 Tablet(s) 2 Tablet(s) Oral daily  folic acid 1 milliGRAM(s) Oral every 24 hours  HYDROmorphone PCA (1 mG/mL) 30 milliLiter(s) PCA Continuous PCA Continuous  pantoprazole    Tablet 40 milliGRAM(s) Oral before breakfast  polyethylene glycol 3350 17 Gram(s) Oral daily  predniSONE   Tablet 40 milliGRAM(s) Oral daily  senna 2 Tablet(s) Oral at bedtime  sodium chloride 0.45%. 1000 milliLiter(s) (30 mL/Hr) IV Continuous <Continuous>    MEDICATIONS  (PRN):  acetaminophen     Tablet .. 650 milliGRAM(s) Oral every 6 hours PRN Temp greater or equal to 38C (100.4F), Mild Pain (1 - 3)  diphenhydrAMINE 25 milliGRAM(s) Oral every 6 hours PRN Rash and/or Itching  melatonin 3 milliGRAM(s) Oral at bedtime PRN Insomnia  naloxone Injectable 0.1 milliGRAM(s) IV Push every 3 minutes PRN For ANY of the following changes in patient status:  A. RR LESS THAN 10 breaths per minute, B. Oxygen saturation LESS THAN 90%, C. Sedation score of 6  ondansetron    Tablet 4 milliGRAM(s) Oral every 6 hours PRN Nausea and/or Vomiting  ondansetron Injectable 4 milliGRAM(s) IV Push every 6 hours PRN Nausea          Vital Signs Last 24 Hrs  T(C): 36.4 (07 Nov 2024 06:00), Max: 37.1 (07 Nov 2024 02:08)  T(F): 97.5 (07 Nov 2024 06:00), Max: 98.8 (07 Nov 2024 02:08)  HR: 84 (07 Nov 2024 06:00) (84 - 105)  BP: 128/79 (07 Nov 2024 06:00) (128/79 - 175/86)  BP(mean): --  RR: 17 (07 Nov 2024 06:00) (17 - 18)  SpO2: 100% (07 Nov 2024 06:00) (95% - 100%)    Parameters below as of 07 Nov 2024 06:00  Patient On (Oxygen Delivery Method): nasal cannula  O2 Flow (L/min): 2      PE  NAD  Awake, alert  Anicteric, MMM  RRR  CTAB  Abd soft, NT, ND  No c/c/e  No rash grossly                            5.7    11.41 )-----------( 357      ( 06 Nov 2024 06:42 )             16.0       11-06    137  |  103  |  38[H]  ----------------------------<  107[H]  4.8   |  18[L]  |  2.18[H]    Ca    8.6      06 Nov 2024 06:42  Phos  4.0     11-06  Mg     1.50     11-06    TPro  7.0  /  Alb  3.7  /  TBili  7.0[H]  /  DBili  x   /  AST  138[H]  /  ALT  45[H]  /  AlkPhos  158[H]  11-06

## 2024-11-08 ENCOUNTER — RESULT REVIEW (OUTPATIENT)
Age: 44
End: 2024-11-08

## 2024-11-08 LAB
ALBUMIN SERPL ELPH-MCNC: 3.6 G/DL — SIGNIFICANT CHANGE UP (ref 3.3–5)
ALP SERPL-CCNC: 147 U/L — HIGH (ref 40–120)
ALT FLD-CCNC: 55 U/L — HIGH (ref 4–41)
ANION GAP SERPL CALC-SCNC: 15 MMOL/L — HIGH (ref 7–14)
ANISOCYTOSIS BLD QL: SIGNIFICANT CHANGE UP
AST SERPL-CCNC: 108 U/L — HIGH (ref 4–40)
BASOPHILS # BLD AUTO: 0.13 K/UL — SIGNIFICANT CHANGE UP (ref 0–0.2)
BASOPHILS NFR BLD AUTO: 0.9 % — SIGNIFICANT CHANGE UP (ref 0–2)
BILIRUB SERPL-MCNC: 6.4 MG/DL — HIGH (ref 0.2–1.2)
BUN SERPL-MCNC: 43 MG/DL — HIGH (ref 7–23)
CALCIUM SERPL-MCNC: 8.8 MG/DL — SIGNIFICANT CHANGE UP (ref 8.4–10.5)
CHLORIDE SERPL-SCNC: 101 MMOL/L — SIGNIFICANT CHANGE UP (ref 98–107)
CO2 SERPL-SCNC: 20 MMOL/L — LOW (ref 22–31)
CREAT SERPL-MCNC: 2.1 MG/DL — HIGH (ref 0.5–1.3)
EGFR: 39 ML/MIN/1.73M2 — LOW
EOSINOPHIL # BLD AUTO: 0 K/UL — SIGNIFICANT CHANGE UP (ref 0–0.5)
EOSINOPHIL NFR BLD AUTO: 0 % — SIGNIFICANT CHANGE UP (ref 0–6)
GIANT PLATELETS BLD QL SMEAR: PRESENT — SIGNIFICANT CHANGE UP
GLUCOSE SERPL-MCNC: 130 MG/DL — HIGH (ref 70–99)
HCT VFR BLD CALC: 18.3 % — CRITICAL LOW (ref 39–50)
HGB BLD-MCNC: 6.5 G/DL — CRITICAL LOW (ref 13–17)
HYPOCHROMIA BLD QL: SLIGHT — SIGNIFICANT CHANGE UP
IANC: 7.04 K/UL — SIGNIFICANT CHANGE UP (ref 1.8–7.4)
LDH SERPL L TO P-CCNC: 994 U/L — HIGH (ref 135–225)
LYMPHOCYTES # BLD AUTO: 33.3 % — SIGNIFICANT CHANGE UP (ref 13–44)
LYMPHOCYTES # BLD AUTO: 4.71 K/UL — HIGH (ref 1–3.3)
MACROCYTES BLD QL: SIGNIFICANT CHANGE UP
MAGNESIUM SERPL-MCNC: 1.4 MG/DL — LOW (ref 1.6–2.6)
MANUAL SMEAR VERIFICATION: SIGNIFICANT CHANGE UP
MCHC RBC-ENTMCNC: 31 PG — SIGNIFICANT CHANGE UP (ref 27–34)
MCHC RBC-ENTMCNC: 35.5 G/DL — SIGNIFICANT CHANGE UP (ref 32–36)
MCV RBC AUTO: 87.1 FL — SIGNIFICANT CHANGE UP (ref 80–100)
MICROCYTES BLD QL: SLIGHT — SIGNIFICANT CHANGE UP
MONOCYTES # BLD AUTO: 1.12 K/UL — HIGH (ref 0–0.9)
MONOCYTES NFR BLD AUTO: 7.9 % — SIGNIFICANT CHANGE UP (ref 2–14)
NEUTROPHILS # BLD AUTO: 8.19 K/UL — HIGH (ref 1.8–7.4)
NEUTROPHILS NFR BLD AUTO: 57 % — SIGNIFICANT CHANGE UP (ref 43–77)
NEUTS BAND # BLD: 0.9 % — SIGNIFICANT CHANGE UP (ref 0–6)
NRBC # BLD: 1 /100 WBCS — HIGH (ref 0–0)
PHOSPHATE SERPL-MCNC: 3.2 MG/DL — SIGNIFICANT CHANGE UP (ref 2.5–4.5)
PLAT MORPH BLD: ABNORMAL
PLATELET # BLD AUTO: 383 K/UL — SIGNIFICANT CHANGE UP (ref 150–400)
PLATELET COUNT - ESTIMATE: NORMAL — SIGNIFICANT CHANGE UP
POIKILOCYTOSIS BLD QL AUTO: SLIGHT — SIGNIFICANT CHANGE UP
POLYCHROMASIA BLD QL SMEAR: SLIGHT — SIGNIFICANT CHANGE UP
POTASSIUM SERPL-MCNC: 3.6 MMOL/L — SIGNIFICANT CHANGE UP (ref 3.5–5.3)
POTASSIUM SERPL-SCNC: 3.6 MMOL/L — SIGNIFICANT CHANGE UP (ref 3.5–5.3)
PROT SERPL-MCNC: 6.6 G/DL — SIGNIFICANT CHANGE UP (ref 6–8.3)
RBC # BLD: 2.1 M/UL — LOW (ref 4.2–5.8)
RBC # BLD: 2.1 M/UL — LOW (ref 4.2–5.8)
RBC # FLD: 20.2 % — HIGH (ref 10.3–14.5)
RBC BLD AUTO: ABNORMAL
RETICS #: 187.9 K/UL — HIGH (ref 25–125)
RETICS/RBC NFR: 9 % — HIGH (ref 0.5–2.5)
SICKLE CELLS BLD QL SMEAR: SIGNIFICANT CHANGE UP
SODIUM SERPL-SCNC: 136 MMOL/L — SIGNIFICANT CHANGE UP (ref 135–145)
TARGETS BLD QL SMEAR: SLIGHT — SIGNIFICANT CHANGE UP
WBC # BLD: 14.15 K/UL — HIGH (ref 3.8–10.5)
WBC # FLD AUTO: 14.15 K/UL — HIGH (ref 3.8–10.5)

## 2024-11-08 PROCEDURE — 99233 SBSQ HOSP IP/OBS HIGH 50: CPT | Mod: GC

## 2024-11-08 PROCEDURE — 99232 SBSQ HOSP IP/OBS MODERATE 35: CPT

## 2024-11-08 PROCEDURE — 93321 DOPPLER ECHO F-UP/LMTD STD: CPT | Mod: 26

## 2024-11-08 PROCEDURE — 71045 X-RAY EXAM CHEST 1 VIEW: CPT | Mod: 26

## 2024-11-08 PROCEDURE — 93308 TTE F-UP OR LMTD: CPT | Mod: 26

## 2024-11-08 RX ORDER — HEPARIN SODIUM 10000 [USP'U]/ML
5000 INJECTION INTRAVENOUS; SUBCUTANEOUS EVERY 8 HOURS
Refills: 0 | Status: DISCONTINUED | OUTPATIENT
Start: 2024-11-08 | End: 2024-11-14

## 2024-11-08 RX ORDER — MAGNESIUM SULFATE IN 0.9% NACL 2 G/50 ML
1 INTRAVENOUS SOLUTION, PIGGYBACK (ML) INTRAVENOUS ONCE
Refills: 0 | Status: COMPLETED | OUTPATIENT
Start: 2024-11-08 | End: 2024-11-08

## 2024-11-08 RX ADMIN — Medication 2 TABLET(S): at 21:25

## 2024-11-08 RX ADMIN — Medication 20 MILLIGRAM(S): at 05:29

## 2024-11-08 RX ADMIN — Medication 30 MILLILITER(S): at 07:18

## 2024-11-08 RX ADMIN — Medication 50 MILLIGRAM(S): at 12:52

## 2024-11-08 RX ADMIN — Medication 30 MILLILITER(S): at 21:24

## 2024-11-08 RX ADMIN — Medication 30 MILLILITER(S): at 19:49

## 2024-11-08 RX ADMIN — Medication 100 GRAM(S): at 12:54

## 2024-11-08 RX ADMIN — Medication 40 MILLIGRAM(S): at 05:28

## 2024-11-08 RX ADMIN — PANTOPRAZOLE SODIUM 40 MILLIGRAM(S): 40 TABLET, DELAYED RELEASE ORAL at 05:28

## 2024-11-08 RX ADMIN — Medication 30 MILLILITER(S): at 12:59

## 2024-11-08 RX ADMIN — HEPARIN SODIUM 5000 UNIT(S): 10000 INJECTION INTRAVENOUS; SUBCUTANEOUS at 21:25

## 2024-11-08 RX ADMIN — Medication 20 MILLIGRAM(S): at 15:44

## 2024-11-08 RX ADMIN — FOLIC ACID 1 MILLIGRAM(S): 1 TABLET ORAL at 12:53

## 2024-11-08 RX ADMIN — DAPTOMYCIN 122 MILLIGRAM(S): 500 INJECTION, POWDER, LYOPHILIZED, FOR SOLUTION INTRAVENOUS at 18:10

## 2024-11-08 RX ADMIN — CHLORHEXIDINE GLUCONATE 1 APPLICATION(S): 40 SOLUTION TOPICAL at 05:31

## 2024-11-08 RX ADMIN — Medication 30 MILLILITER(S): at 03:39

## 2024-11-08 NOTE — PROGRESS NOTE ADULT - ASSESSMENT
45 yo M with PMH significant for sickle cell anemia, cirrhosis, hemochromatosis, and gout who presented with a low hemoglobin count from outpatient, also reporting worsening for lower back and lower extremity pain similar to prior sickle cell crises  No fever, has leukocytosis  Medport in place ~10 years, no exterior signs infection  BCXs 4/4 CoNS  Elevated LFTs in setting cirrhosis/hemochromatosis  CT no PE, bilateral opacities, atelectasis, LAD  No other cardiac hardware  Never had infection of catheter prior  Would presume this is true CLABSI, but would attempt to salvage line  Note new rise in CrCl--adjust vanco and monitor levels, monitor Cr    #Staph epi bacteremia in the setting of Mediport   MATA improving.     Recommendations:   given rising Cr, switched to daptomycin  check CPK in am   day 12/14 of therapy today.   needs 14 days of therapy total.   plan to salvage the port       Will sign off, please call with questions.     Svetlana Retana  Please contact through MS Teams   If no response or past 5 pm/weekend call [phone].

## 2024-11-08 NOTE — PROGRESS NOTE ADULT - SUBJECTIVE AND OBJECTIVE BOX
[facility] [place] [facility] Division of Kidney Diseases & Hypertension  FOLLOW UP NOTE  [phone]--------------------------------------------------------------------------------  Chief Complaint: MATA, Sickle cell crisis.     24 hour events/subjective: Pt was seen and examined earlier today. No acute overnight events. Pt's foot pain has improved. Still has back pain. Lower extremity edema has improved. Still has pain in the back.   Pt denies SOB/ Constipation/ Diarrhea/ Nausea/ Vomiting/ abdominal pain/ chest pain/ tingling/ numbness.      PAST HISTORY  --------------------------------------------------------------------------------  No significant changes to PMH, PSH, FHx, SHx, unless otherwise noted    ALLERGIES & MEDICATIONS  --------------------------------------------------------------------------------  Allergies    ceftriaxone (Anaphylaxis)  piperacillin-tazobactam (Urticaria)  penicillin (Pruritus)  hydroxyurea (Other)    Intolerances      Standing Inpatient Medications  allopurinol 50 milliGRAM(s) Oral daily  chlorhexidine 2% Cloths 1 Application(s) Topical <User Schedule>  DAPTOmycin IVPB 550 milliGRAM(s) IV Intermittent every 24 hours  Deferasirox (Jadenu) 360mg tablets 2 Tablet(s) 2 Tablet(s) Oral daily  folic acid 1 milliGRAM(s) Oral every 24 hours  furosemide   Injectable 20 milliGRAM(s) IV Push two times a day  HYDROmorphone PCA (1 mG/mL) 30 milliLiter(s) PCA Continuous PCA Continuous  magnesium sulfate  IVPB 1 Gram(s) IV Intermittent once  pantoprazole    Tablet 40 milliGRAM(s) Oral before breakfast  polyethylene glycol 3350 17 Gram(s) Oral daily  predniSONE   Tablet 40 milliGRAM(s) Oral daily  senna 2 Tablet(s) Oral at bedtime  sodium chloride 0.45%. 1000 milliLiter(s) IV Continuous <Continuous>    PRN Inpatient Medications  acetaminophen     Tablet .. 650 milliGRAM(s) Oral every 6 hours PRN  diphenhydrAMINE 25 milliGRAM(s) Oral every 6 hours PRN  melatonin 3 milliGRAM(s) Oral at bedtime PRN  naloxone Injectable 0.1 milliGRAM(s) IV Push every 3 minutes PRN  ondansetron    Tablet 4 milliGRAM(s) Oral every 6 hours PRN  ondansetron Injectable 4 milliGRAM(s) IV Push every 6 hours PRN      REVIEW OF SYSTEMS  --------------------------------------------------------------------------------  Gen: In pain  Skin: No rashes  Head/Eyes/Ears: Normal hearing  Respiratory: On supplemental oxygen  CV: No chest pain  GI: No abdominal pain, diarrhea  : No dysuria, hematuria  MSK: + B/L edema, L foot pain + with interval improvement  Heme: No easy bruising or bleeding  Psych: No significant depression    All other systems were reviewed and are negative, except as noted.    VITALS/PHYSICAL EXAM  --------------------------------------------------------------------------------  T(C): 36.3 (11-08-24 @ 10:50), Max: 37 (11-07-24 @ 18:00)  HR: 85 (11-08-24 @ 10:50) (80 - 99)  BP: 171/98 (11-08-24 @ 10:50) (138/85 - 171/98)  RR: 18 (11-08-24 @ 10:50) (18 - 18)  SpO2: 99% (11-08-24 @ 10:50) (97% - 99%)  Wt(kg): --    Physical Exam:  Gen: On supplemental oxygen  HEENT: MMM  Pulm: Lower zone decreased breath sounds   CV: S1S2  Abd: Soft, +BS   Ext: Pitting LE edema B/L, L foot tenderness.  Neuro: Awake  Skin: Warm and dry  Vascular access: Mediport RIJ and peripheral IVs      LABS/STUDIES  --------------------------------------------------------------------------------              6.5    14.15 >-----------<  383      [11-08-24 @ 06:45]              18.3     136  |  101  |  43  ----------------------------<  130      [11-08-24 @ 06:45]  3.6   |  20  |  2.10        Ca     8.8     [11-08-24 @ 06:45]      Mg     1.40     [11-08-24 @ 06:45]      Phos  3.2     [11-08-24 @ 06:45]    TPro  6.6  /  Alb  3.6  /  TBili  6.4  /  DBili  x   /  AST  108  /  ALT  55  /  AlkPhos  147  [11-08-24 @ 06:45]              [11-08-24 @ 06:45]    Creatinine Trend:  SCr 2.10 [11-08 @ 06:45]  SCr 2.19 [11-07 @ 13:34]  SCr 2.18 [11-06 @ 06:42]  SCr 2.24 [11-05 @ 08:01]  SCr 2.31 [11-04 @ 10:58]    Urine Creatinine 64      [11-04-24 @ 00:31]  Urine Protein 202      [11-04-24 @ 00:31]  Urine Sodium 56      [11-04-24 @ 00:31]  Urine Urea Nitrogen 381.1      [11-04-24 @ 00:31]  Urine Potassium 16.3      [11-04-24 @ 00:31]

## 2024-11-08 NOTE — PROGRESS NOTE ADULT - PROBLEM SELECTOR PLAN 2
- Hb 5.2 on presentation, baseline Hb 6-6.5  - Per chart review: s/p 1 units of pRBCs on 10/25, 10/29, 11/4, 11/7  - caution with excessive transfusion in setting of hemochromatosis – c/w home chelating agents   - Continue PCA = 0.5mg demand /6min lockout/20mg (4h limit) - NO changes today.  - Monitor pain - trend SC labs.  -Heme recs appreciated.

## 2024-11-08 NOTE — PROGRESS NOTE ADULT - PROBLEM SELECTOR PLAN 7
VTE PPx: encourage ambulation, cr improving, unlikely bleeding; start hep SQ    Dispo- Remains acute -pending renal improvement, pain control.

## 2024-11-08 NOTE — PROGRESS NOTE ADULT - SUBJECTIVE AND OBJECTIVE BOX
[facility] Division of Hospital Medicine  Martine Iglesias MD  Pager [phone]    Patient is a 44y old  Male who presents with a chief complaint of sickle cell crisis      SUBJECTIVE / OVERNIGHT EVENTS: feeling some back pain today; L foot pain improved; still with lots of LE edema to abdomen; last ECHO in August EF 67%      MEDICATIONS  (STANDING):  allopurinol 50 milliGRAM(s) Oral daily  chlorhexidine 2% Cloths 1 Application(s) Topical <User Schedule>  DAPTOmycin IVPB 550 milliGRAM(s) IV Intermittent every 24 hours  Deferasirox (Jadenu) 360mg tablets 2 Tablet(s) 2 Tablet(s) Oral daily  folic acid 1 milliGRAM(s) Oral every 24 hours  furosemide   Injectable 20 milliGRAM(s) IV Push two times a day  HYDROmorphone PCA (1 mG/mL) 30 milliLiter(s) PCA Continuous PCA Continuous  pantoprazole    Tablet 40 milliGRAM(s) Oral before breakfast  polyethylene glycol 3350 17 Gram(s) Oral daily  predniSONE   Tablet 40 milliGRAM(s) Oral daily  senna 2 Tablet(s) Oral at bedtime  sodium chloride 0.45%. 1000 milliLiter(s) (30 mL/Hr) IV Continuous <Continuous>    MEDICATIONS  (PRN):  acetaminophen     Tablet .. 650 milliGRAM(s) Oral every 6 hours PRN Temp greater or equal to 38C (100.4F), Mild Pain (1 - 3)  diphenhydrAMINE 25 milliGRAM(s) Oral every 6 hours PRN Rash and/or Itching  melatonin 3 milliGRAM(s) Oral at bedtime PRN Insomnia  naloxone Injectable 0.1 milliGRAM(s) IV Push every 3 minutes PRN For ANY of the following changes in patient status:  A. RR LESS THAN 10 breaths per minute, B. Oxygen saturation LESS THAN 90%, C. Sedation score of 6  ondansetron    Tablet 4 milliGRAM(s) Oral every 6 hours PRN Nausea and/or Vomiting  ondansetron Injectable 4 milliGRAM(s) IV Push every 6 hours PRN Nausea      PHYSICAL EXAM:  Vital Signs Last 24 Hrs  T(F): 97.3 (08 Nov 2024 10:50), Max: 98.6 (07 Nov 2024 18:00)  HR: 85 (08 Nov 2024 10:50) (80 - 99)  BP: 171/98 (08 Nov 2024 10:50) (138/85 - 171/98)  RR: 18 (08 Nov 2024 10:50) (18 - 18)  SpO2: 99% (08 Nov 2024 10:50) (97% - 99%)    Parameters below as of 08 Nov 2024 06:00  Patient On (Oxygen Delivery Method): nasal cannula  O2 Flow (L/min): 2      CONSTITUTIONAL: NAD, appears comfortable  EYES: PERRLA; conjunctiva and sclera clear  ENMT: Moist oral mucosa; normal dentition  RESPIRATORY: Normal respiratory effort; lungs are clear to auscultation bilaterally  CARDIOVASCULAR: Regular rate and rhythm; +++ lower extremity edema to abdomen  ABDOMEN: Nontender to palpation, normoactive bowel sounds  MUSCULOSKELETAL:  no clubbing or cyanosis of digits; no joint swelling or tenderness to palpation  PSYCH: A+O to person, place, and time; affect appropriate  NEUROLOGY: CN 2-12 are intact and symmetric; no gross sensory deficits   SKIN: No rashes; no palpable lesions    LABS:                        6.5    14.15 )-----------( 383      ( 08 Nov 2024 06:45 )             18.3     11-08    136  |  101  |  43[H]  ----------------------------<  130[H]  3.6   |  20[L]  |  2.10[H]    Ca    8.8      08 Nov 2024 06:45  Phos  3.2     11-08  Mg     1.40     11-08    TPro  6.6  /  Alb  3.6  /  TBili  6.4[H]  /  DBili  x   /  AST  108[H]  /  ALT  55[H]  /  AlkPhos  147[H]  11-08

## 2024-11-08 NOTE — PROGRESS NOTE ADULT - SUBJECTIVE AND OBJECTIVE BOX
44yPatient is a 44y old  Male who presents with a chief complaint of sickle cell crisis (08 Nov 2024 13:52)      Interval history:  Afebrile, complains of low back pain, swelling of legs.       Allergies:   ceftriaxone (Anaphylaxis)  piperacillin-tazobactam (Urticaria)  penicillin (Pruritus)  hydroxyurea (Other)      Antimicrobials:  DAPTOmycin IVPB 550 milliGRAM(s) IV Intermittent every 24 hours        REVIEW OF SYSTEMS:  No chest pain   No abdominal pain  No rash.        Vital Signs Last 24 Hrs  T(C): 37.1 (11-08-24 @ 18:00), Max: 37.1 (11-08-24 @ 14:00)  T(F): 98.8 (11-08-24 @ 18:00), Max: 98.8 (11-08-24 @ 14:00)  HR: 98 (11-08-24 @ 18:00) (78 - 98)  BP: 156/97 (11-08-24 @ 18:00) (138/85 - 171/98)  BP(mean): --  RR: 18 (11-08-24 @ 18:00) (18 - 18)  SpO2: 97% (11-08-24 @ 18:00) (96% - 99%)      PHYSICAL EXAM:  Pt in no acute distress, alert, awake.   breathing comfortably  non distended abdomen  + edema LE   + port                             6.5    14.15 )-----------( 383      ( 08 Nov 2024 06:45 )             18.3   11-08    136  |  101  |  43[H]  ----------------------------<  130[H]  3.6   |  20[L]  |  2.10[H]    Ca    8.8      08 Nov 2024 06:45  Phos  3.2     11-08  Mg     1.40     11-08    TPro  6.6  /  Alb  3.6  /  TBili  6.4[H]  /  DBili  x   /  AST  108[H]  /  ALT  55[H]  /  AlkPhos  147[H]  11-08      LIVER FUNCTIONS - ( 08 Nov 2024 06:45 )  Alb: 3.6 g/dL / Pro: 6.6 g/dL / ALK PHOS: 147 U/L / ALT: 55 U/L / AST: 108 U/L / GGT: x                 < from: Xray Chest 1 View- PORTABLE-Routine (Xray Chest 1 View- PORTABLE-Routine .) (11.08.24 @ 11:24) >  IMPRESSION: Clear lungs.

## 2024-11-08 NOTE — PROGRESS NOTE ADULT - PROBLEM SELECTOR PLAN 1
At the time of admission pt had Scr of 1.55. His baseline is ~0.8. MATA initially improved to his baseline Scr with blood transfusions and IV fluids. Pt got CT with IV contrast on 10/25/24. But Scr remained around his baseline till 10/31. Pt has medi port for recurrent transfusions. He was later found to have MRSA bacteremia. On IV antibiotics. Acute chest syndrome was ruled out. Pt was given Toradol on 10/30 and Scr started to rise 1.36 on 10/31 and worsened to 2.06 on 11/2. Scr is elevated but stable at 2.18 (11/6). No labs today. Pt is on IV antibiotics. Now on KVO IV fluids only.  UA was not done but UPCR showed 3.1 (10/25). USG: Right kidney: 12.4 cm. Left kidney: 11.3 cm. Repeat UPCR (11/4) - UPCR 3.2. Large blood noted. MATA in setting of infection related ATN, sickle cell disease and NSAID use.  PLAN:  Off IV fluids now. Only KVO.    Started lasix 20 q12 (11/7). Pt was transfused pRBC on 11/7.  Continue with the same dose of lasix for now. Monitor UOP.  Monitor I/Os and labs.   Avoid nephrotoxins/ NSIADs. Dose medications as per eGFR.  Rest of the management of as per primary team.

## 2024-11-08 NOTE — PROGRESS NOTE ADULT - PROBLEM SELECTOR PLAN 1
- patient with Mediport for over 10 years, has good outpatient followup never had infection from port before   - 10.25  - BCx pos MRSA.  - Bcx 10.27 - NGTD.  - Plan for abx through 11/10.   - Was on vanco - but c/b worsening MATA. Now stopped and transitioned to IV daptomycin. completing 11/8

## 2024-11-09 LAB
ADD ON TEST-SPECIMEN IN LAB: SIGNIFICANT CHANGE UP
ANION GAP SERPL CALC-SCNC: 14 MMOL/L — SIGNIFICANT CHANGE UP (ref 7–14)
BUN SERPL-MCNC: 47 MG/DL — HIGH (ref 7–23)
CALCIUM SERPL-MCNC: 8.8 MG/DL — SIGNIFICANT CHANGE UP (ref 8.4–10.5)
CHLORIDE SERPL-SCNC: 102 MMOL/L — SIGNIFICANT CHANGE UP (ref 98–107)
CK SERPL-CCNC: 24 U/L — LOW (ref 30–200)
CO2 SERPL-SCNC: 22 MMOL/L — SIGNIFICANT CHANGE UP (ref 22–31)
CREAT SERPL-MCNC: 2.03 MG/DL — HIGH (ref 0.5–1.3)
EGFR: 40 ML/MIN/1.73M2 — LOW
GLUCOSE SERPL-MCNC: 110 MG/DL — HIGH (ref 70–99)
HCT VFR BLD CALC: 18.4 % — CRITICAL LOW (ref 39–50)
HGB BLD-MCNC: 6.3 G/DL — CRITICAL LOW (ref 13–17)
MAGNESIUM SERPL-MCNC: 1.5 MG/DL — LOW (ref 1.6–2.6)
MCHC RBC-ENTMCNC: 30 PG — SIGNIFICANT CHANGE UP (ref 27–34)
MCHC RBC-ENTMCNC: 34.2 G/DL — SIGNIFICANT CHANGE UP (ref 32–36)
MCV RBC AUTO: 87.6 FL — SIGNIFICANT CHANGE UP (ref 80–100)
NRBC # BLD: 1 /100 WBCS — HIGH (ref 0–0)
NRBC # FLD: 0.16 K/UL — HIGH (ref 0–0)
PHOSPHATE SERPL-MCNC: 3.3 MG/DL — SIGNIFICANT CHANGE UP (ref 2.5–4.5)
PLATELET # BLD AUTO: 413 K/UL — HIGH (ref 150–400)
POTASSIUM SERPL-MCNC: 3.7 MMOL/L — SIGNIFICANT CHANGE UP (ref 3.5–5.3)
POTASSIUM SERPL-SCNC: 3.7 MMOL/L — SIGNIFICANT CHANGE UP (ref 3.5–5.3)
RBC # BLD: 2.1 M/UL — LOW (ref 4.2–5.8)
RBC # FLD: 21.1 % — HIGH (ref 10.3–14.5)
SODIUM SERPL-SCNC: 138 MMOL/L — SIGNIFICANT CHANGE UP (ref 135–145)
WBC # BLD: 16.57 K/UL — HIGH (ref 3.8–10.5)
WBC # FLD AUTO: 16.57 K/UL — HIGH (ref 3.8–10.5)

## 2024-11-09 PROCEDURE — 99232 SBSQ HOSP IP/OBS MODERATE 35: CPT | Mod: GC

## 2024-11-09 PROCEDURE — 99232 SBSQ HOSP IP/OBS MODERATE 35: CPT

## 2024-11-09 RX ORDER — HYDROMORPHONE HCL/0.9% NACL/PF 6 MG/30 ML
30 PATIENT CONTROLLED ANALGESIA SYRINGE INTRAVENOUS
Refills: 0 | Status: DISCONTINUED | OUTPATIENT
Start: 2024-11-09 | End: 2024-11-09

## 2024-11-09 RX ORDER — HYDROMORPHONE HCL/0.9% NACL/PF 6 MG/30 ML
30 PATIENT CONTROLLED ANALGESIA SYRINGE INTRAVENOUS
Refills: 0 | Status: DISCONTINUED | OUTPATIENT
Start: 2024-11-09 | End: 2024-11-14

## 2024-11-09 RX ORDER — MAGNESIUM SULFATE IN 0.9% NACL 2 G/50 ML
1 INTRAVENOUS SOLUTION, PIGGYBACK (ML) INTRAVENOUS ONCE
Refills: 0 | Status: COMPLETED | OUTPATIENT
Start: 2024-11-09 | End: 2024-11-09

## 2024-11-09 RX ADMIN — Medication 20 MILLIGRAM(S): at 12:07

## 2024-11-09 RX ADMIN — HEPARIN SODIUM 5000 UNIT(S): 10000 INJECTION INTRAVENOUS; SUBCUTANEOUS at 22:05

## 2024-11-09 RX ADMIN — Medication 30 MILLILITER(S): at 05:10

## 2024-11-09 RX ADMIN — FOLIC ACID 1 MILLIGRAM(S): 1 TABLET ORAL at 11:57

## 2024-11-09 RX ADMIN — Medication 30 MILLILITER(S): at 11:15

## 2024-11-09 RX ADMIN — Medication 30 MILLILITER(S): at 01:00

## 2024-11-09 RX ADMIN — Medication 30 MILLILITER(S): at 07:20

## 2024-11-09 RX ADMIN — Medication 30 MILLILITER(S): at 13:19

## 2024-11-09 RX ADMIN — Medication 30 MILLILITER(S): at 11:16

## 2024-11-09 RX ADMIN — Medication 50 MILLIGRAM(S): at 11:57

## 2024-11-09 RX ADMIN — HEPARIN SODIUM 5000 UNIT(S): 10000 INJECTION INTRAVENOUS; SUBCUTANEOUS at 13:26

## 2024-11-09 RX ADMIN — Medication 650 MILLIGRAM(S): at 22:41

## 2024-11-09 RX ADMIN — HEPARIN SODIUM 5000 UNIT(S): 10000 INJECTION INTRAVENOUS; SUBCUTANEOUS at 05:11

## 2024-11-09 RX ADMIN — POLYETHYLENE GLYCOL 3350 17 GRAM(S): 17 POWDER, FOR SOLUTION ORAL at 11:56

## 2024-11-09 RX ADMIN — CHLORHEXIDINE GLUCONATE 1 APPLICATION(S): 40 SOLUTION TOPICAL at 05:17

## 2024-11-09 RX ADMIN — Medication 650 MILLIGRAM(S): at 17:23

## 2024-11-09 RX ADMIN — Medication 650 MILLIGRAM(S): at 23:40

## 2024-11-09 RX ADMIN — PANTOPRAZOLE SODIUM 40 MILLIGRAM(S): 40 TABLET, DELAYED RELEASE ORAL at 05:11

## 2024-11-09 RX ADMIN — Medication 100 GRAM(S): at 10:05

## 2024-11-09 RX ADMIN — Medication 40 MILLIGRAM(S): at 05:11

## 2024-11-09 RX ADMIN — Medication 650 MILLIGRAM(S): at 18:23

## 2024-11-09 RX ADMIN — DAPTOMYCIN 122 MILLIGRAM(S): 500 INJECTION, POWDER, LYOPHILIZED, FOR SOLUTION INTRAVENOUS at 18:04

## 2024-11-09 RX ADMIN — Medication 20 MILLIGRAM(S): at 05:11

## 2024-11-09 RX ADMIN — Medication 30 MILLILITER(S): at 19:28

## 2024-11-09 RX ADMIN — Medication 2 TABLET(S): at 22:04

## 2024-11-09 NOTE — PROGRESS NOTE ADULT - PROBLEM SELECTOR PLAN 1
At the time of admission pt had Scr of 1.55. His baseline is ~0.8. MATA initially improved to his baseline Scr with blood transfusions and IV fluids. Pt got CT with IV contrast on 10/25/24. But Scr remained around his baseline till 10/31. Pt has medi port for recurrent transfusions. He was later found to have MRSA bacteremia. On IV antibiotics. Acute chest syndrome was ruled out. Pt was given Toradol on 10/30 and Scr started to rise 1.36 on 10/31 with peak SCr of 2.24 on 11/5. SCr remains elevated but improved to 2.03 today. USG: Right kidney: 12.4 cm. Left kidney: 11.3 cm. Repeat UPCR (11/4) - UPCR 3.2. Large blood noted. MATA in setting of infection related ATN, sickle cell disease and NSAID use.  PLAN:  Off IV fluids now. Only KVO.    Started Lasix 20 q12 (11/7). Pt was transfused pRBC on 11/7.  Continue with the same dose of lasix for now. Monitor UOP.  Monitor I/Os and labs.   Avoid nephrotoxins/ NSIADs. Dose medications as per eGFR.  Rest of the management of as per primary team.

## 2024-11-09 NOTE — PROGRESS NOTE ADULT - PROBLEM SELECTOR PLAN 1
- patient with Mediport for over 10 years, has good outpatient followup never had infection from port before   - 10.25  - BCx pos MRSA.  - Bcx 10.27 - NGTD.  - Plan for abx through 11/10.   - Was on vanco - but c/b worsening MATA. Now stopped and transitioned to IV daptomycin. completing 11/10  CKP today 24

## 2024-11-09 NOTE — PROGRESS NOTE ADULT - SUBJECTIVE AND OBJECTIVE BOX
[facility] Division of Hospital Medicine  Martine Iglesias MD  Pager [phone]    Patient is a 44y old  Male who presents with a chief complaint of sickle cell crisis     SUBJECTIVE / OVERNIGHT EVENTS: still with some back pain; d/w pt due to his renal function I cannot add NSAIDs which he is used to using in past crisis; d/w pt will inc PCA for short time to achieve improved pain control; pt agreeable; + BM      MEDICATIONS  (STANDING):  allopurinol 50 milliGRAM(s) Oral daily  chlorhexidine 2% Cloths 1 Application(s) Topical <User Schedule>  DAPTOmycin IVPB 550 milliGRAM(s) IV Intermittent every 24 hours  Deferasirox (Jadenu) 360mg tablets 2 Tablet(s) 2 Tablet(s) Oral daily  folic acid 1 milliGRAM(s) Oral every 24 hours  furosemide   Injectable 20 milliGRAM(s) IV Push two times a day  heparin   Injectable 5000 Unit(s) SubCutaneous every 8 hours  HYDROmorphone PCA (1 mG/mL) 30 milliLiter(s) PCA Continuous PCA Continuous  pantoprazole    Tablet 40 milliGRAM(s) Oral before breakfast  polyethylene glycol 3350 17 Gram(s) Oral daily  senna 2 Tablet(s) Oral at bedtime  sodium chloride 0.45%. 1000 milliLiter(s) (30 mL/Hr) IV Continuous <Continuous>    MEDICATIONS  (PRN):  acetaminophen     Tablet .. 650 milliGRAM(s) Oral every 6 hours PRN Temp greater or equal to 38C (100.4F), Mild Pain (1 - 3)  diphenhydrAMINE 25 milliGRAM(s) Oral every 6 hours PRN Rash and/or Itching  melatonin 3 milliGRAM(s) Oral at bedtime PRN Insomnia  naloxone Injectable 0.1 milliGRAM(s) IV Push every 3 minutes PRN For ANY of the following changes in patient status:  A. RR LESS THAN 10 breaths per minute, B. Oxygen saturation LESS THAN 90%, C. Sedation score of 6  ondansetron    Tablet 4 milliGRAM(s) Oral every 6 hours PRN Nausea and/or Vomiting  ondansetron Injectable 4 milliGRAM(s) IV Push every 6 hours PRN Nausea      PHYSICAL EXAM:  Vital Signs Last 24 Hrs  T(F): 97.8 (09 Nov 2024 10:00), Max: 98.8 (08 Nov 2024 14:00)  HR: 91 (09 Nov 2024 10:00) (78 - 98)  BP: 175/95 (09 Nov 2024 11:55) (147/92 - 176/95)  RR: 18 (09 Nov 2024 10:00) (18 - 18)  SpO2: 94% (09 Nov 2024 10:00) (94% - 98%)    Parameters below as of 09 Nov 2024 10:00  Patient On (Oxygen Delivery Method): room air        CONSTITUTIONAL: NAD, appears comfortable  EYES: PERRLA; conjunctiva and sclera clear  ENMT: Moist oral mucosa; normal dentition  RESPIRATORY: Normal respiratory effort; lungs are clear to auscultation bilaterally  CARDIOVASCULAR: Regular rate and rhythm; ++ lower extremity edema; abd edema improved  ABDOMEN: Nontender to palpation, normoactive bowel sounds  MUSCULOSKELETAL:   no clubbing or cyanosis of digits; no joint swelling or tenderness to palpation  PSYCH: A+O to person, place, and time; affect appropriate  NEUROLOGY: CN 2-12 are intact and symmetric; no gross sensory deficits   SKIN: No rashes; no palpable lesions    LABS:                        6.3    16.57 )-----------( 413      ( 09 Nov 2024 06:57 )             18.4     11-09    138  |  102  |  47[H]  ----------------------------<  110[H]  3.7   |  22  |  2.03[H]    Ca    8.8      09 Nov 2024 06:57  Phos  3.3     11-09  Mg     1.50     11-09

## 2024-11-09 NOTE — PROGRESS NOTE ADULT - SUBJECTIVE AND OBJECTIVE BOX
[facility] DIVISION OF KIDNEY DISEASES AND HYPERTENSION   FOLLOW UP NOTE  --------------------------------------------------------------------------------  Chief Complaint: MATA, Sickle cell crisis.     24 hour events/subjective: Pt was seen and examined. Pt is feeling "better". Pt denied n/v/f/c/sob/difficulty urinating. Pt feels as though his LE edema is improving.     PAST HISTORY  --------------------------------------------------------------------------------  No significant changes to PMH, PSH, FHx, SHx, unless otherwise noted    ALLERGIES & MEDICATIONS  --------------------------------------------------------------------------------  Allergies  ceftriaxone (Anaphylaxis)  piperacillin-tazobactam (Urticaria)  penicillin (Pruritus)  hydroxyurea (Other)    Intolerances    Standing Inpatient Medications  allopurinol 50 milliGRAM(s) Oral daily  chlorhexidine 2% Cloths 1 Application(s) Topical <User Schedule>  DAPTOmycin IVPB 550 milliGRAM(s) IV Intermittent every 24 hours  Deferasirox (Jadenu) 360mg tablets 2 Tablet(s) 2 Tablet(s) Oral daily  folic acid 1 milliGRAM(s) Oral every 24 hours  furosemide   Injectable 20 milliGRAM(s) IV Push two times a day  heparin   Injectable 5000 Unit(s) SubCutaneous every 8 hours  HYDROmorphone PCA (1 mG/mL) 30 milliLiter(s) PCA Continuous PCA Continuous  pantoprazole    Tablet 40 milliGRAM(s) Oral before breakfast  polyethylene glycol 3350 17 Gram(s) Oral daily  senna 2 Tablet(s) Oral at bedtime  sodium chloride 0.45%. 1000 milliLiter(s) IV Continuous <Continuous>    PRN Inpatient Medications  acetaminophen     Tablet .. 650 milliGRAM(s) Oral every 6 hours PRN  diphenhydrAMINE 25 milliGRAM(s) Oral every 6 hours PRN  melatonin 3 milliGRAM(s) Oral at bedtime PRN  naloxone Injectable 0.1 milliGRAM(s) IV Push every 3 minutes PRN  ondansetron    Tablet 4 milliGRAM(s) Oral every 6 hours PRN  ondansetron Injectable 4 milliGRAM(s) IV Push every 6 hours PRN    REVIEW OF SYSTEMS  --------------------------------------------------------------------------------  All other systems were reviewed and are negative, except as noted.    VITALS/PHYSICAL EXAM  --------------------------------------------------------------------------------  T(C): 36.6 (11-09-24 @ 06:00), Max: 37.1 (11-08-24 @ 14:00)  HR: 89 (11-09-24 @ 06:00) (78 - 98)  BP: 150/95 (11-09-24 @ 06:00) (147/92 - 158/90)  RR: 18 (11-09-24 @ 06:00) (18 - 18)  SpO2: 97% (11-09-24 @ 06:00) (96% - 98%)  Wt(kg): --    Physical Exam:  Gen: NAD  HEENT: MMM  Pulm: Lower zone decreased breath sounds   CV: S1S2  Abd: Soft, +BS   Ext: Pitting LE edema B/L, L foot tenderness.  Neuro: Awake  Skin: Warm and dry  Vascular access: Mediport RIJ and peripheral IVs    LABS/STUDIES  --------------------------------------------------------------------------------              6.3    16.57 >-----------<  413      [11-09-24 @ 06:57]              18.4     138  |  102  |  47  ----------------------------<  110      [11-09-24 @ 06:57]  3.7   |  22  |  2.03        Ca     8.8     [11-09-24 @ 06:57]      Mg     1.50     [11-09-24 @ 06:57]      Phos  3.3     [11-09-24 @ 06:57]    TPro  6.6  /  Alb  3.6  /  TBili  6.4  /  DBili  x   /  AST  108  /  ALT  55  /  AlkPhos  147  [11-08-24 @ 06:45]          [11-08-24 @ 06:45]    Creatinine Trend:  SCr 2.03 [11-09 @ 06:57]  SCr 2.10 [11-08 @ 06:45]  SCr 2.19 [11-07 @ 13:34]  SCr 2.18 [11-06 @ 06:42]  SCr 2.24 [11-05 @ 08:01]    Urine Creatinine 64      [11-04-24 @ 00:31]  Urine Protein 202      [11-04-24 @ 00:31]  Urine Sodium 56      [11-04-24 @ 00:31]  Urine Urea Nitrogen 381.1      [11-04-24 @ 00:31]  Urine Potassium 16.3      [11-04-24 @ 00:31]    HBsAg Nonreact      [10-26-24 @ 06:57]  HCV 0.09, Nonreact      [10-26-24 @ 06:57]

## 2024-11-09 NOTE — PROGRESS NOTE ADULT - PROBLEM SELECTOR PLAN 2
- Hb 5.2 on presentation, baseline Hb 6-6.5  - Per chart review: s/p 1 units of pRBCs on 10/25, 10/29, 11/4, 11/7  - caution with excessive transfusion in setting of hemochromatosis – c/w home chelating agents   PCA demand dose inc to .75 due to poor pain control, plan only for 1-2 days  - Monitor pain - trend SC labs.  -Heme recs appreciated. Klisyri Pregnancy And Lactation Text: It is unknown if this medication can harm a developing fetus or if it is excreted in breast milk.

## 2024-11-10 PROCEDURE — 70450 CT HEAD/BRAIN W/O DYE: CPT | Mod: 26

## 2024-11-10 PROCEDURE — 99233 SBSQ HOSP IP/OBS HIGH 50: CPT

## 2024-11-10 RX ORDER — HYDRALAZINE HYDROCHLORIDE 50 MG/1
25 TABLET, FILM COATED ORAL THREE TIMES A DAY
Refills: 0 | Status: DISCONTINUED | OUTPATIENT
Start: 2024-11-10 | End: 2024-11-12

## 2024-11-10 RX ORDER — BUTALBITAL, ACETAMINOPHEN AND CAFFEINE 50; 325; 40 MG/1; MG/1; MG/1
1 CAPSULE ORAL ONCE
Refills: 0 | Status: COMPLETED | OUTPATIENT
Start: 2024-11-10 | End: 2024-11-10

## 2024-11-10 RX ADMIN — BUTALBITAL, ACETAMINOPHEN AND CAFFEINE 1 TABLET(S): 50; 325; 40 CAPSULE ORAL at 03:52

## 2024-11-10 RX ADMIN — Medication 30 MILLILITER(S): at 20:55

## 2024-11-10 RX ADMIN — CHLORHEXIDINE GLUCONATE 1 APPLICATION(S): 40 SOLUTION TOPICAL at 08:16

## 2024-11-10 RX ADMIN — HEPARIN SODIUM 5000 UNIT(S): 10000 INJECTION INTRAVENOUS; SUBCUTANEOUS at 06:11

## 2024-11-10 RX ADMIN — FOLIC ACID 1 MILLIGRAM(S): 1 TABLET ORAL at 12:03

## 2024-11-10 RX ADMIN — HYDRALAZINE HYDROCHLORIDE 25 MILLIGRAM(S): 50 TABLET, FILM COATED ORAL at 10:07

## 2024-11-10 RX ADMIN — Medication 30 MILLILITER(S): at 19:43

## 2024-11-10 RX ADMIN — Medication 2 TABLET(S): at 22:14

## 2024-11-10 RX ADMIN — Medication 20 MILLIGRAM(S): at 14:32

## 2024-11-10 RX ADMIN — PANTOPRAZOLE SODIUM 40 MILLIGRAM(S): 40 TABLET, DELAYED RELEASE ORAL at 06:11

## 2024-11-10 RX ADMIN — Medication 30 MILLILITER(S): at 07:26

## 2024-11-10 RX ADMIN — HEPARIN SODIUM 5000 UNIT(S): 10000 INJECTION INTRAVENOUS; SUBCUTANEOUS at 14:32

## 2024-11-10 RX ADMIN — DAPTOMYCIN 122 MILLIGRAM(S): 500 INJECTION, POWDER, LYOPHILIZED, FOR SOLUTION INTRAVENOUS at 19:34

## 2024-11-10 RX ADMIN — Medication 20 MILLIGRAM(S): at 06:11

## 2024-11-10 RX ADMIN — Medication 30 MILLILITER(S): at 01:57

## 2024-11-10 RX ADMIN — Medication 650 MILLIGRAM(S): at 10:43

## 2024-11-10 RX ADMIN — Medication 650 MILLIGRAM(S): at 09:43

## 2024-11-10 RX ADMIN — Medication 30 MILLILITER(S): at 01:58

## 2024-11-10 RX ADMIN — HEPARIN SODIUM 5000 UNIT(S): 10000 INJECTION INTRAVENOUS; SUBCUTANEOUS at 22:15

## 2024-11-10 RX ADMIN — Medication 30 MILLILITER(S): at 14:02

## 2024-11-10 RX ADMIN — Medication 50 MILLIGRAM(S): at 12:02

## 2024-11-10 RX ADMIN — HYDRALAZINE HYDROCHLORIDE 25 MILLIGRAM(S): 50 TABLET, FILM COATED ORAL at 18:30

## 2024-11-10 NOTE — PROVIDER CONTACT NOTE (OTHER) - ASSESSMENT
Pt has no neurological deficits, no facial droop, no nausea/vomiting, AxO4, resting in bed, c/o pain, NAD
Pt is lying down in bed and does feel pain in the lower back and jaw area.
Temp 98.1 HR 99, /95, RR 18, O2SAT 99% Nasal canula 2L. no c/o shortness of breath, no c/o chest pain, no respiratory distress noted.
No signs or symptoms. Pt resting comfortably in bed.
A&Ox4. Pt c/o dizziness, headache and nausea
Pt A&O4 sleeping in bed, woke up to take BP, no c/o SOB, chest pain, neurologically intact, NAD noted
Pt is lying down in bed, pt is asymptomatic.
Pt is lying down in bed and does feel pain in the lower back and jaw area.
Temp 98.1 HR 99, /95, RR 18, O2SAT 99% Nasal canula 2L.

## 2024-11-10 NOTE — PROVIDER CONTACT NOTE (OTHER) - ACTION/TREATMENT ORDERED:
Provider aware and acknowledged. As per provider okay to administer blood.
Provider notified, will give next dose of prn Tylenol early, will monitor headache and chin numbness
Provider states will come see patient. On re-assess, pt reported nausea abated, headache improved, and dizziness is less. Care plan ongoing.
To recheck BP at 11:55 am.
Provider made aware, will give esgic
Provider aware. As per provider no need for cbc as of now, get the manual BP will continue from manual bp as pt needs Blood transfusion based on hemoglobin 4.8.
Okay to give Lasix now d/t high BP.
Provider aware. Repeat lab work stated above ordered and obtained. Continue to monitor
To recheck BP with manual.

## 2024-11-10 NOTE — PROVIDER CONTACT NOTE (OTHER) - SITUATION
Pt /104
Pt /106, pt complaining of headache
Pt /95
Pt /95
Pt c/o numbness on right chin and 8/10 headache, pt is not due for tylenol until 23:23
Biochemistry  (DRAKE Good) contacted unit on 11/1/2024 at 08:34 stating contaminated special collected for CMP panel with AM labs
Pt c/o dizziness, headache and nausea. /90, HR 81, Temp 98.2F, SPO2 95% on 2L. prn Zofran and tylenol admin
Temp 98.1 HR 99, /95, RR 18, O2SAT 99% Nasal canula 2L. Is it okay to administer blood based on vital signs?
Temp 98, HR 98, /88 manual, RR 18, O2SAT 99% Nasal canula 2L. Is it okay to administer blood based on vital signs?

## 2024-11-10 NOTE — PROVIDER CONTACT NOTE (OTHER) - BACKGROUND
Dx: sickle cell crisis, anemia, positive blood cx. Pt had critical value this am for hemoglobin 5.8, hematocrit 15.9; provider was notified at that time.
Pt admitted for shortness of breath
Pt admitted for shortness of breath and SCC. PMH of Sickle Cell and Gout
Pt admitted d/t shortness of breath. Pt has a PMH of gout and sickle cell anemia.
Pt admitted for SCC and SOB, PMH of Sickle cell and Gout
Pt admitted for shortness of breath
Pt admitted d/t shortness of breath. Pt has a PMH of gout and sickle cell anemia.
Pt admitted for shortness of breath
Pt admitted d/t shortness of breath. Pt has a PMH of gout and sickle cell anemia.

## 2024-11-10 NOTE — PROGRESS NOTE ADULT - PROBLEM SELECTOR PLAN 2
- Hb 5.2 on presentation, baseline Hb 6-6.5  - Per chart review: s/p 1 units of pRBCs on 10/25, 10/29, 11/4, 11/7  - caution with excessive transfusion in setting of hemochromatosis – c/w home chelating agents   PCA demand dose inc to .75 due to poor pain control, plan only for 1-2 days  - Monitor pain - trend SC labs.  -Heme recs appreciated.

## 2024-11-10 NOTE — PROVIDER CONTACT NOTE (OTHER) - REASON
/95
Biochemistry  (DRAKE Good) contacted unit on 11/1/2024 at 08:34 stating contaminated special collected for CMP panel with AM labs
Pt /104
Temp 98.1 HR 99, /95, RR 18, O2SAT 99% Nasal canula 2L. Is it okay to administer blood based on vital signs?
Temp 98.1 HR 99, /95, RR 18, O2SAT 99% Nasal canula 2L. Is it okay to administer blood based on vital signs?
Pt c/o dizziness, headache and nausea. /90, HR 81, Temp 98.2F, SPO2 95% on 2L. prn Zofran and tylenol admin
/95
Pt /106, pt complaining of headache
Pt c/o numbness on right chin and 8/10 headache

## 2024-11-10 NOTE — PROGRESS NOTE ADULT - SUBJECTIVE AND OBJECTIVE BOX
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; usually treated with NSAIDs but cannot use now due to MATA; pt reports HA, will check CT, pt agreeable; overall back pain improving; off O2 and sats 97%      MEDICATIONS  (STANDING):  allopurinol 50 milliGRAM(s) Oral daily  chlorhexidine 2% Cloths 1 Application(s) Topical <User Schedule>  DAPTOmycin IVPB 550 milliGRAM(s) IV Intermittent every 24 hours  Deferasirox (Jadenu) 360mg tablets 2 Tablet(s) 2 Tablet(s) Oral daily  folic acid 1 milliGRAM(s) Oral every 24 hours  furosemide   Injectable 20 milliGRAM(s) IV Push two times a day  heparin   Injectable 5000 Unit(s) SubCutaneous every 8 hours  hydrALAZINE 25 milliGRAM(s) Oral three times a day  HYDROmorphone PCA (1 mG/mL) 30 milliLiter(s) PCA Continuous PCA Continuous  pantoprazole    Tablet 40 milliGRAM(s) Oral before breakfast  polyethylene glycol 3350 17 Gram(s) Oral daily  senna 2 Tablet(s) Oral at bedtime  sodium chloride 0.45%. 1000 milliLiter(s) (30 mL/Hr) IV Continuous <Continuous>    MEDICATIONS  (PRN):  acetaminophen     Tablet .. 650 milliGRAM(s) Oral every 6 hours PRN Temp greater or equal to 38C (100.4F), Mild Pain (1 - 3)  diphenhydrAMINE 25 milliGRAM(s) Oral every 6 hours PRN Rash and/or Itching  melatonin 3 milliGRAM(s) Oral at bedtime PRN Insomnia  naloxone Injectable 0.1 milliGRAM(s) IV Push every 3 minutes PRN For ANY of the following changes in patient status:  A. RR LESS THAN 10 breaths per minute, B. Oxygen saturation LESS THAN 90%, C. Sedation score of 6  ondansetron    Tablet 4 milliGRAM(s) Oral every 6 hours PRN Nausea and/or Vomiting  ondansetron Injectable 4 milliGRAM(s) IV Push every 6 hours PRN Nausea      PHYSICAL EXAM:  Vital Signs Last 24 Hrs  T(F): 97.7 (10 Nov 2024 10:00), Max: 98.1 (09 Nov 2024 14:00)  HR: 87 (10 Nov 2024 10:00) (80 - 99)  BP: 179/94 (10 Nov 2024 10:00) (158/94 - 179/94)  RR: 18 (10 Nov 2024 10:00) (17 - 18)  SpO2: 96% (10 Nov 2024 10:00) (96% - 99%)    Parameters below as of 10 Nov 2024 10:00  Patient On (Oxygen Delivery Method): room air        CONSTITUTIONAL: NAD, appears comfortable  EYES: PERRLA; conjunctiva and sclera clear  ENMT: Moist oral mucosa; normal dentition  RESPIRATORY: Normal respiratory effort; lungs are clear to auscultation bilaterally  CARDIOVASCULAR: Regular rate and rhythm; ++ lower extremity edema, improving  ABDOMEN: Nontender to palpation, normoactive bowel sounds  MUSCULOSKELETAL:  no clubbing or cyanosis of digits; no joint swelling or tenderness to palpation  PSYCH: A+O to person, place, and time; affect appropriate  NEUROLOGY: CN 2-12 are intact and symmetric; no gross sensory deficits   SKIN: No rashes; no palpable lesions    LABS:

## 2024-11-10 NOTE — PROVIDER CONTACT NOTE (OTHER) - DATE AND TIME:
09-Nov-2024 10:55
01-Nov-2024 08:45
10-Nov-2024 03:10
09-Nov-2024 22:34
07-Nov-2024 19:54
10-Nov-2024 14:10
09-Nov-2024 12:00
28-Oct-2024 13:31
07-Nov-2024 20:11

## 2024-11-11 LAB
ANION GAP SERPL CALC-SCNC: 12 MMOL/L — SIGNIFICANT CHANGE UP (ref 7–14)
BUN SERPL-MCNC: 40 MG/DL — HIGH (ref 7–23)
CALCIUM SERPL-MCNC: 8.3 MG/DL — LOW (ref 8.4–10.5)
CHLORIDE SERPL-SCNC: 102 MMOL/L — SIGNIFICANT CHANGE UP (ref 98–107)
CO2 SERPL-SCNC: 26 MMOL/L — SIGNIFICANT CHANGE UP (ref 22–31)
CREAT SERPL-MCNC: 1.84 MG/DL — HIGH (ref 0.5–1.3)
EGFR: 46 ML/MIN/1.73M2 — LOW
GLUCOSE SERPL-MCNC: 101 MG/DL — HIGH (ref 70–99)
HCT VFR BLD CALC: 18.3 % — CRITICAL LOW (ref 39–50)
HGB BLD-MCNC: 6.5 G/DL — CRITICAL LOW (ref 13–17)
MAGNESIUM SERPL-MCNC: 1.3 MG/DL — LOW (ref 1.6–2.6)
MCHC RBC-ENTMCNC: 31.1 PG — SIGNIFICANT CHANGE UP (ref 27–34)
MCHC RBC-ENTMCNC: 35.5 G/DL — SIGNIFICANT CHANGE UP (ref 32–36)
MCV RBC AUTO: 87.6 FL — SIGNIFICANT CHANGE UP (ref 80–100)
NRBC # BLD: 3 /100 WBCS — HIGH (ref 0–0)
NRBC # FLD: 0.52 K/UL — HIGH (ref 0–0)
PHOSPHATE SERPL-MCNC: 4.8 MG/DL — HIGH (ref 2.5–4.5)
PLATELET # BLD AUTO: 379 K/UL — SIGNIFICANT CHANGE UP (ref 150–400)
POTASSIUM SERPL-MCNC: 3.8 MMOL/L — SIGNIFICANT CHANGE UP (ref 3.5–5.3)
POTASSIUM SERPL-SCNC: 3.8 MMOL/L — SIGNIFICANT CHANGE UP (ref 3.5–5.3)
RBC # BLD: 2.09 M/UL — LOW (ref 4.2–5.8)
RBC # FLD: 21.5 % — HIGH (ref 10.3–14.5)
SODIUM SERPL-SCNC: 140 MMOL/L — SIGNIFICANT CHANGE UP (ref 135–145)
WBC # BLD: 15.58 K/UL — HIGH (ref 3.8–10.5)
WBC # FLD AUTO: 15.58 K/UL — HIGH (ref 3.8–10.5)

## 2024-11-11 PROCEDURE — 99232 SBSQ HOSP IP/OBS MODERATE 35: CPT

## 2024-11-11 PROCEDURE — 99232 SBSQ HOSP IP/OBS MODERATE 35: CPT | Mod: GC

## 2024-11-11 RX ORDER — FUROSEMIDE 40 MG
40 TABLET ORAL
Refills: 0 | Status: DISCONTINUED | OUTPATIENT
Start: 2024-11-11 | End: 2024-11-12

## 2024-11-11 RX ORDER — MAGNESIUM SULFATE IN 0.9% NACL 2 G/50 ML
2 INTRAVENOUS SOLUTION, PIGGYBACK (ML) INTRAVENOUS ONCE
Refills: 0 | Status: COMPLETED | OUTPATIENT
Start: 2024-11-11 | End: 2024-11-11

## 2024-11-11 RX ADMIN — HYDRALAZINE HYDROCHLORIDE 25 MILLIGRAM(S): 50 TABLET, FILM COATED ORAL at 18:01

## 2024-11-11 RX ADMIN — HEPARIN SODIUM 5000 UNIT(S): 10000 INJECTION INTRAVENOUS; SUBCUTANEOUS at 14:17

## 2024-11-11 RX ADMIN — HYDRALAZINE HYDROCHLORIDE 25 MILLIGRAM(S): 50 TABLET, FILM COATED ORAL at 10:31

## 2024-11-11 RX ADMIN — PANTOPRAZOLE SODIUM 40 MILLIGRAM(S): 40 TABLET, DELAYED RELEASE ORAL at 06:15

## 2024-11-11 RX ADMIN — Medication 650 MILLIGRAM(S): at 01:15

## 2024-11-11 RX ADMIN — Medication 30 MILLILITER(S): at 11:27

## 2024-11-11 RX ADMIN — Medication 30 MILLILITER(S): at 19:25

## 2024-11-11 RX ADMIN — Medication 30 MILLILITER(S): at 18:43

## 2024-11-11 RX ADMIN — HEPARIN SODIUM 5000 UNIT(S): 10000 INJECTION INTRAVENOUS; SUBCUTANEOUS at 21:10

## 2024-11-11 RX ADMIN — HYDRALAZINE HYDROCHLORIDE 25 MILLIGRAM(S): 50 TABLET, FILM COATED ORAL at 02:02

## 2024-11-11 RX ADMIN — Medication 25 GRAM(S): at 06:21

## 2024-11-11 RX ADMIN — Medication 40 MILLIGRAM(S): at 14:18

## 2024-11-11 RX ADMIN — Medication 30 MILLILITER(S): at 09:09

## 2024-11-11 RX ADMIN — CHLORHEXIDINE GLUCONATE 1 APPLICATION(S): 40 SOLUTION TOPICAL at 09:14

## 2024-11-11 RX ADMIN — Medication 20 MILLIGRAM(S): at 06:15

## 2024-11-11 RX ADMIN — Medication 50 MILLIGRAM(S): at 12:01

## 2024-11-11 RX ADMIN — FOLIC ACID 1 MILLIGRAM(S): 1 TABLET ORAL at 12:01

## 2024-11-11 RX ADMIN — Medication 2 TABLET(S): at 21:09

## 2024-11-11 RX ADMIN — Medication 650 MILLIGRAM(S): at 01:45

## 2024-11-11 RX ADMIN — Medication 30 MILLILITER(S): at 07:25

## 2024-11-11 RX ADMIN — HEPARIN SODIUM 5000 UNIT(S): 10000 INJECTION INTRAVENOUS; SUBCUTANEOUS at 06:14

## 2024-11-11 NOTE — PROGRESS NOTE ADULT - ASSESSMENT
44 year old male with history of sickle cell disease admitted with CLABSI  No Hypoxia or fevers    Sickle cell disease  -undergoing care with Dr Jordan of [facility]  -Transfuse for Hgb < 5.5  -chronic hemolysis, Iron overload  -pls limit transfusions, maintain Hgb 5.5  -Continue Jadenu  -follow up Dr Jordan after discharge      Bacteremia  -likely in setting of mediport as source  - Bcx from 10/25 with MRSA in anaerobic and aerobic bottles   - daptomycin completed  -ID following, if positive cx persist, port to be removed, for now ok to use port per ID      Meghana Real NP  Hematology/Oncology  [facility]  [phone] (Office)  [phone] (Alt office)  Evenings and weekends please call MD on call or office   44 year old male with history of sickle cell disease admitted with CLABSI  No Hypoxia or fevers    Sickle cell disease  -undergoing care with Dr Jordan of [facility]  -Transfuse for Hgb < 5.5  -chronic hemolysis, Iron overload  -pls limit transfusions, maintain Hgb 5.5  -Continue Jadenu  -follow up Dr Jordan after discharge      Bacteremia  -likely in setting of mediport as source  -Bcx from 10/25 with MRSA in anaerobic and aerobic bottles   -daptomycin completed  -ID following, if positive cx persist, port to be removed, for now ok to use port per ID      Meghana Real NP  Hematology/Oncology  [facility]  [phone] (Office)  [phone] (Alt office)  Evenings and weekends please call MD on call or office

## 2024-11-11 NOTE — PROGRESS NOTE ADULT - SUBJECTIVE AND OBJECTIVE BOX
[facility] Division of Kidney Diseases & Hypertension  FOLLOW UP NOTE  [phone]--------------------------------------------------------------------------------  Chief Complaint: MATA, Sickle cell crisis.    24 hour events/subjective: Pt was seen and examined earlier today. No acute overnight events. No new complaints. Pt reports feeling well. LE edema is improving a bit.   Pt denies SOB/ Constipation/ Diarrhea/ Nausea/ Vomiting/ abdominal pain/ chest pain/ tingling/ numbness.         PAST HISTORY  --------------------------------------------------------------------------------  No significant changes to PMH, PSH, FHx, SHx, unless otherwise noted    ALLERGIES & MEDICATIONS  --------------------------------------------------------------------------------  Allergies    ceftriaxone (Anaphylaxis)  piperacillin-tazobactam (Urticaria)  penicillin (Pruritus)  hydroxyurea (Other)    Intolerances      Standing Inpatient Medications  allopurinol 50 milliGRAM(s) Oral daily  chlorhexidine 2% Cloths 1 Application(s) Topical <User Schedule>  Deferasirox (Jadenu) 360mg tablets 2 Tablet(s) 2 Tablet(s) Oral daily  folic acid 1 milliGRAM(s) Oral every 24 hours  furosemide   Injectable 20 milliGRAM(s) IV Push two times a day  heparin   Injectable 5000 Unit(s) SubCutaneous every 8 hours  hydrALAZINE 25 milliGRAM(s) Oral three times a day  HYDROmorphone PCA (1 mG/mL) 30 milliLiter(s) PCA Continuous PCA Continuous  pantoprazole    Tablet 40 milliGRAM(s) Oral before breakfast  polyethylene glycol 3350 17 Gram(s) Oral daily  senna 2 Tablet(s) Oral at bedtime  sodium chloride 0.45%. 1000 milliLiter(s) IV Continuous <Continuous>    PRN Inpatient Medications  acetaminophen     Tablet .. 650 milliGRAM(s) Oral every 6 hours PRN  diphenhydrAMINE 25 milliGRAM(s) Oral every 6 hours PRN  melatonin 3 milliGRAM(s) Oral at bedtime PRN  naloxone Injectable 0.1 milliGRAM(s) IV Push every 3 minutes PRN  ondansetron    Tablet 4 milliGRAM(s) Oral every 6 hours PRN  ondansetron Injectable 4 milliGRAM(s) IV Push every 6 hours PRN      REVIEW OF SYSTEMS  --------------------------------------------------------------------------------  As noted above.   All other systems were reviewed and are negative, except as noted.    VITALS/PHYSICAL EXAM  --------------------------------------------------------------------------------  T(C): 36.8 (11-11-24 @ 10:29), Max: 36.8 (11-10-24 @ 18:00)  HR: 87 (11-11-24 @ 10:29) (81 - 95)  BP: 157/89 (11-11-24 @ 10:29) (157/89 - 174/104)  RR: 18 (11-11-24 @ 10:29) (18 - 18)  SpO2: 100% (11-11-24 @ 10:29) (93% - 100%)  Wt(kg): --        Physical Exam:  Gen: NAD  HEENT: MMM  Pulm: Lower zone decreased breath sounds   CV: S1S2  Abd: Soft, +BS   Ext: Pitting LE edema B/L, L foot tenderness.  Neuro: Awake  Skin: Warm and dry  Vascular access: Mediport RIJ and peripheral IVs      LABS/STUDIES  --------------------------------------------------------------------------------              6.5    15.58 >-----------<  379      [11-11-24 @ 05:30]              18.3     140  |  102  |  40  ----------------------------<  101      [11-11-24 @ 05:30]  3.8   |  26  |  1.84        Ca     8.3     [11-11-24 @ 05:30]      Mg     1.30     [11-11-24 @ 05:30]      Phos  4.8     [11-11-24 @ 05:30]    Creatinine Trend:  SCr 1.84 [11-11 @ 05:30]  SCr 2.03 [11-09 @ 06:57]  SCr 2.10 [11-08 @ 06:45]  SCr 2.19 [11-07 @ 13:34]  SCr 2.18 [11-06 @ 06:42]

## 2024-11-11 NOTE — PROGRESS NOTE ADULT - NS ATTEND AMEND GEN_ALL_CORE FT
----- Message from Cherri Jimenez MD sent at 8/24/2020 10:29 AM CDT -----  Please notify patient sleep study is read. Update flow sheet   Schedule in office follow up. Sleep hygiene should be review to assess factors that may improve sleep quality.     Nitin Purcell Completed antibiotics   Renal function improving, nephrology following

## 2024-11-11 NOTE — PROGRESS NOTE ADULT - PROBLEM SELECTOR PLAN 1
- patient with Mediport for over 10 years, has good outpatient followup never had infection from port before   - 10.25  - BCx pos MRSA.  - Bcx 10.27 - NGTD.  - Was on vanco - but c/b worsening MATA. Now stopped and transitioned to IV daptomycin. now complete

## 2024-11-11 NOTE — PROGRESS NOTE ADULT - SUBJECTIVE AND OBJECTIVE BOX
patient seen today, clinically looks improved  Hgb 6.5, remains on 2L NC, lower extremity edema bilaterally      MEDICATIONS  (STANDING):  allopurinol 50 milliGRAM(s) Oral daily  chlorhexidine 2% Cloths 1 Application(s) Topical <User Schedule>  Deferasirox (Jadenu) 360mg tablets 2 Tablet(s) 2 Tablet(s) Oral daily  folic acid 1 milliGRAM(s) Oral every 24 hours  furosemide   Injectable 20 milliGRAM(s) IV Push two times a day  heparin   Injectable 5000 Unit(s) SubCutaneous every 8 hours  hydrALAZINE 25 milliGRAM(s) Oral three times a day  HYDROmorphone PCA (1 mG/mL) 30 milliLiter(s) PCA Continuous PCA Continuous  pantoprazole    Tablet 40 milliGRAM(s) Oral before breakfast  polyethylene glycol 3350 17 Gram(s) Oral daily  senna 2 Tablet(s) Oral at bedtime  sodium chloride 0.45%. 1000 milliLiter(s) (30 mL/Hr) IV Continuous <Continuous>    MEDICATIONS  (PRN):  acetaminophen     Tablet .. 650 milliGRAM(s) Oral every 6 hours PRN Temp greater or equal to 38C (100.4F), Mild Pain (1 - 3)  diphenhydrAMINE 25 milliGRAM(s) Oral every 6 hours PRN Rash and/or Itching  melatonin 3 milliGRAM(s) Oral at bedtime PRN Insomnia  naloxone Injectable 0.1 milliGRAM(s) IV Push every 3 minutes PRN For ANY of the following changes in patient status:  A. RR LESS THAN 10 breaths per minute, B. Oxygen saturation LESS THAN 90%, C. Sedation score of 6  ondansetron    Tablet 4 milliGRAM(s) Oral every 6 hours PRN Nausea and/or Vomiting  ondansetron Injectable 4 milliGRAM(s) IV Push every 6 hours PRN Nausea      Vital Signs Last 24 Hrs  T(C): 36.8 (11 Nov 2024 10:29), Max: 36.8 (10 Nov 2024 18:00)  T(F): 98.2 (11 Nov 2024 10:29), Max: 98.2 (10 Nov 2024 18:00)  HR: 87 (11 Nov 2024 10:29) (81 - 95)  BP: 157/89 (11 Nov 2024 10:29) (157/89 - 174/104)  BP(mean): --  RR: 18 (11 Nov 2024 10:29) (18 - 18)  SpO2: 100% (11 Nov 2024 10:29) (93% - 100%)    Parameters below as of 11 Nov 2024 06:00  Patient On (Oxygen Delivery Method): room air        PE  NAD  Awake, alert  Anicteric, MMM  RRR  CTAB  Abd soft, NT, ND  No c/c/e  No rash grossly  FROM                          6.5    15.58 )-----------( 379      ( 11 Nov 2024 05:30 )             18.3       11-11    140  |  102  |  40[H]  ----------------------------<  101[H]  3.8   |  26  |  1.84[H]    Ca    8.3[L]      11 Nov 2024 05:30  Phos  4.8     11-11  Mg     1.30     11-11

## 2024-11-11 NOTE — PROGRESS NOTE ADULT - SUBJECTIVE AND OBJECTIVE BOX
[facility] Division of Hospital Medicine  Martine Iglesias MD  Pager [phone]    Patient is a 44y old  Male who presents with a chief complaint of sickle cell crisis      SUBJECTIVE / OVERNIGHT EVENTS: overall improving      MEDICATIONS  (STANDING):  allopurinol 50 milliGRAM(s) Oral daily  chlorhexidine 2% Cloths 1 Application(s) Topical <User Schedule>  Deferasirox (Jadenu) 360mg tablets 2 Tablet(s) 2 Tablet(s) Oral daily  folic acid 1 milliGRAM(s) Oral every 24 hours  furosemide   Injectable 40 milliGRAM(s) IV Push two times a day  heparin   Injectable 5000 Unit(s) SubCutaneous every 8 hours  hydrALAZINE 25 milliGRAM(s) Oral three times a day  HYDROmorphone PCA (1 mG/mL) 30 milliLiter(s) PCA Continuous PCA Continuous  pantoprazole    Tablet 40 milliGRAM(s) Oral before breakfast  polyethylene glycol 3350 17 Gram(s) Oral daily  senna 2 Tablet(s) Oral at bedtime  sodium chloride 0.45%. 1000 milliLiter(s) (30 mL/Hr) IV Continuous <Continuous>    MEDICATIONS  (PRN):  acetaminophen     Tablet .. 650 milliGRAM(s) Oral every 6 hours PRN Temp greater or equal to 38C (100.4F), Mild Pain (1 - 3)  diphenhydrAMINE 25 milliGRAM(s) Oral every 6 hours PRN Rash and/or Itching  melatonin 3 milliGRAM(s) Oral at bedtime PRN Insomnia  naloxone Injectable 0.1 milliGRAM(s) IV Push every 3 minutes PRN For ANY of the following changes in patient status:  A. RR LESS THAN 10 breaths per minute, B. Oxygen saturation LESS THAN 90%, C. Sedation score of 6  ondansetron    Tablet 4 milliGRAM(s) Oral every 6 hours PRN Nausea and/or Vomiting  ondansetron Injectable 4 milliGRAM(s) IV Push every 6 hours PRN Nausea    PHYSICAL EXAM:  Vital Signs Last 24 Hrs  T(F): 98.2 (11 Nov 2024 10:29), Max: 98.2 (10 Nov 2024 18:00)  HR: 87 (11 Nov 2024 10:29) (81 - 95)  BP: 157/89 (11 Nov 2024 10:29) (157/89 - 174/104)  RR: 18 (11 Nov 2024 10:29) (18 - 18)  SpO2: 100% (11 Nov 2024 10:29) (93% - 100%)    Parameters below as of 11 Nov 2024 10:29  Patient On (Oxygen Delivery Method): room air        CONSTITUTIONAL: NAD, appears comfortable  EYES: PERRLA; conjunctiva and sclera clear  ENMT: Moist oral mucosa; normal dentition  RESPIRATORY: Normal respiratory effort; lungs are clear to auscultation bilaterally  CARDIOVASCULAR: Regular rate and rhythm; ++ lower extremity edema, improving  ABDOMEN: Nontender to palpation, normoactive bowel sounds  MUSCULOSKELETAL:  no clubbing or cyanosis of digits; no joint swelling or tenderness to palpation  PSYCH: A+O to person, place, and time; affect appropriate  NEUROLOGY: CN 2-12 are intact and symmetric; no gross sensory deficits   SKIN: No rashes; no palpable lesions    LABS:                        6.5    15.58 )-----------( 379      ( 11 Nov 2024 05:30 )             18.3     11-11    140  |  102  |  40[H]  ----------------------------<  101[H]  3.8   |  26  |  1.84[H]    Ca    8.3[L]      11 Nov 2024 05:30  Phos  4.8     11-11  Mg     1.30     11-11

## 2024-11-11 NOTE — CHART NOTE - NSCHARTNOTEFT_GEN_A_CORE
Nutrition Follow-Up Chart Note         Source: Patient A&Ox 4     Chart [x ]     Pt is seen for nutrition follow up as per protocol.    __________________ Medical Course__________________  Patient is a 45yo M with PMH significant for sickle cell anemia (port in place) , cirrhosis, hemochromatosis, and gout who presented with a low hemoglobin count, admitted with sickle cell crisis. Found  to have MRSA bacteremia on vancomycin. Course c/b anemia requiring transfusions and MATA. Nephro/ID on board.      Diet, Regular (10-25-24 @ 09:56)         __________________ Nutrition Course__________________   Patient reports his appetite continues to be good in hospital, able to consume most of his meals. As per RN flow sheet pt's appetite varies from %. Pt continues on regular diet. Discussed importance to follow low sodium diet given pt with LE edema, reviewed high sodium foods with pt. Pt verbalize understanding but still requesting regular diet instead of DASH/TLC diet, ordered Lasix.  Pt reports he has been having regular BMs daily with bowel regimen in use, LBM today as reported by pt. Denies any GI distress (nausea/vomiting/diarrhea/constipation.) IV hydration ordered. Pt's labs notable with low magnesium 1.4L, s/p magnesium sulfate for repletion. RD to remain available for further nutritional interventions as indicated. RD to remain available for further nutritional interventions as indicated.     __________________ Pertinent Medications__________________   MEDICATIONS  (STANDING):  allopurinol 50 milliGRAM(s) Oral daily  chlorhexidine 2% Cloths 1 Application(s) Topical <User Schedule>  Deferasirox (Jadenu) 360mg tablets 2 Tablet(s) 2 Tablet(s) Oral daily  folic acid 1 milliGRAM(s) Oral every 24 hours  furosemide   Injectable 20 milliGRAM(s) IV Push two times a day  heparin   Injectable 5000 Unit(s) SubCutaneous every 8 hours  hydrALAZINE 25 milliGRAM(s) Oral three times a day  HYDROmorphone PCA (1 mG/mL) 30 milliLiter(s) PCA Continuous PCA Continuous  pantoprazole    Tablet 40 milliGRAM(s) Oral before breakfast  polyethylene glycol 3350 17 Gram(s) Oral daily  senna 2 Tablet(s) Oral at bedtime  sodium chloride 0.45%. 1000 milliLiter(s) (30 mL/Hr) IV Continuous <Continuous>    MEDICATIONS  (PRN):  acetaminophen     Tablet .. 650 milliGRAM(s) Oral every 6 hours PRN Temp greater or equal to 38C (100.4F), Mild Pain (1 - 3)  diphenhydrAMINE 25 milliGRAM(s) Oral every 6 hours PRN Rash and/or Itching  melatonin 3 milliGRAM(s) Oral at bedtime PRN Insomnia  naloxone Injectable 0.1 milliGRAM(s) IV Push every 3 minutes PRN For ANY of the following changes in patient status:  A. RR LESS THAN 10 breaths per minute, B. Oxygen saturation LESS THAN 90%, C. Sedation score of 6  ondansetron    Tablet 4 milliGRAM(s) Oral every 6 hours PRN Nausea and/or Vomiting  ondansetron Injectable 4 milliGRAM(s) IV Push every 6 hours PRN Nausea      __________________ Pertinent Labs__________________   11-11    140  |  102  |  40[H]  ----------------------------<  101[H]  3.8   |  26  |  1.84[H]    Ca    8.3[L]      11 Nov 2024 05:30  Phos  4.8     11-11  Mg     1.30     11-11                            6.5    15.58 )-----------( 379      ( 11 Nov 2024 05:30 )             18.3                __________________ Anthropometrics__________________     Anthropometrics: Height (cm): 175.3 (10-30), 175.3 (08-13)  Weight (kg): 86.2 (10-24), 92.1 (08-13)  BMI (kg/m2): 28.1 (10-30), 28.1 (10-24), 30 (08-13)  IBW:  160 +/- 10%    Weight Assessment: No updated weight for further weight assessment     Edema: 4+ b/l LE edema     Skin: None noted at present as per RN flow sheet.     Estimated Needs Assessment:   [ x ] No change in need assessment     Weight Used: IBW 160lbs/72.5kg   Estimated Energy: 0611-3329 Kcal/kg/day (25-30  kcal/kg)  Estimated Protein: .5  gm/kg/day (1.2-1.4  gm/kg)  Estimated Fluid: per Medical and team discretion.    Nutrition Focused Physical Exam:   [ x ] not applicable;        Previous Nutrition Diagnosis:   Not Ready for Diet/Lifestyle Change    Nutrition Diagnosis is : [ x ] ongoing      Education:  [ x ] Given today        Type of education provided: Low sodium nutrition therapy      Recommendations:  [ x ] Please obtain and trend weights weekly and document in RN flow sheet.   [ x ] RD to remain available for further nutritional interventions as indicated.     Monitoring and Evaluation:   [ x ] Monitor PO intake, skin integrity, bowel regimen, and nutrition pertinent labs.  [ x ] Tolerance to diet prescription [ x ] weights [ x ] follow up per protocol
PDI	Current Rx	Drug Type	Rx Written	Rx Dispensed	Drug	Quantity	Days Supply	Prescriber Name	Prescriber NANCY #	Payment Method	Dispenser  A	N	O	08/02/2024	08/05/2024	oxycodone hcl (ir) 30 mg tab	180	30	Jordan, Shirley SOLITARIO	[id number]	[facility]  A	N	O	07/02/2024	07/15/2024	oxycodone hcl (ir) 30 mg tab	180	30	Ayala[address]	[id number]	[facility]   A	N	O	06/06/2024	06/19/2024	oxycodone hcl (ir) 30 mg tab	180	30	AyalaJag	[id number]	Medicaid	[place]   A	N	O	05/09/2024	05/17/2024	oxycodone hcl (ir) 30 mg tab	180	30	Jordan, Shirley SOLITARIO	[id number]	Medicaid	[place]   A	N	O	04/11/2024	04/19/2024	oxycodone hcl (ir) 30 mg tab	180	30	Jordan, Shirley SOLITARIO	[id number]	Medicaid	[place]   A	N	O	03/14/2024	03/25/2024	oxycodone hcl (ir) 30 mg tab	180	30	Jordan, Shirley SOLITARIO	[id number]	Medicaid	[place]   A	N	O	02/08/2024	02/20/2024	oxycodone hcl (ir) 30 mg tab	180	30	Jordan, Shirley SOLITARIO	[id number]	Medicaid	[place]   A	N	O	01/11/2024	01/16/2024	oxycodone hcl (ir) 30 mg tab	180	30	Jordan, Shirley SOLITARIO	[id number]	Medicaid	[place]   A	N	O	12/14/2023	12/18/2023	oxycodone hcl (ir) 30 mg tab	180	30	Jordan, Shirley SOLITARIO	[id number]	Medicaid	[place]   A	N	O	11/16/2023	11/17/2023	oxycodone hcl (ir) 30 mg tab	180	30	Jordan, Shirley SOLITARIO	[id number]	Medicaid	[place]   B	Y	O	09/26/2024	09/26/2024	oxycodone hcl (ir) 30 mg tab	180	30	Jag Ayala	[id number]	Medicaid	[facility]  B	N	O	08/29/2024	09/04/2024	oxycodone hcl (ir) 30 mg tab	180	30	Shirley Jordan MD	[id number]	Medicaid	[facility]
Critical lab noted to be Hgb 5.1, Hct 14.0. Discussed with attending, will transfuse 1 unit PRBC.
Notified by RN that patient c/o headache and numbness of right chin area. Patient seen and assessed at bedside. Patient  is not any acute distress. Patient reports bilateral frontal headache  which started yesterday. Rates pain as  10/10 which radiates to right side of face associated  with numbness of the right chin area. Patient reports that he gets similar headaches previously at home  but not associated with facial numbness. Reports Tylenol helps for the pain. Denies nausea,  vomiting , tooth ache, vision changes and weakness.     T(C): 36.3 (09 Nov 2024 22:00), Max: 36.7 (09 Nov 2024 14:00)  T(F): 97.3 (09 Nov 2024 22:00), Max: 98.1 (09 Nov 2024 14:00)  HR: 80 (09 Nov 2024 22:00) (80 - 99)  BP: 164/95 (09 Nov 2024 22:00) (150/95 - 176/95)  RR: 18 (09 Nov 2024 22:00) (17 - 18)  SpO2: 96% (09 Nov 2024 22:00) (94% - 97%)  O2 Parameters below as of 09 Nov 2024 22:00  Patient On (Oxygen Delivery Method): room air    General: No distress  Neuro: Alert and oriented x4. Speech is clear. No facial droop noted. Patient is able to follow commands. Sensation  V1-V3 intact. No focal neurological deficits noted.     Headache: Likely migraine    Tylenol PRN for headache   Consider Neuro consult if headache persists   Will continue to monitor

## 2024-11-11 NOTE — PROGRESS NOTE ADULT - PROBLEM SELECTOR PLAN 2
- Hb 5.2 on presentation, baseline Hb 6-6.5  - Per chart review: s/p 1 units of pRBCs on 10/25, 10/29, 11/4, 11/7  - caution with excessive transfusion in setting of hemochromatosis – c/w home chelating agents   PCA demand dose inc to .75 due to poor pain control, plan only for 1-2 days  - Monitor pain - trend SC labs.  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

## 2024-11-11 NOTE — PROGRESS NOTE ADULT - PROBLEM SELECTOR PLAN 1
At the time of admission pt had Scr of 1.55. His baseline is ~0.8. MATA initially improved to his baseline Scr with blood transfusions and IV fluids. Pt got CT with IV contrast on 10/25/24. But Scr remained around his baseline till 10/31. Pt has medi port for recurrent transfusions. He was later found to have MRSA bacteremia. On IV antibiotics. Acute chest syndrome was ruled out. Pt was given Toradol on 10/30 and Scr started to rise 1.36 on 10/31 with peak SCr of 2.24 on 11/5. SCr remains elevated but improved to 1.74 today (11/11). USG: Right kidney: 12.4 cm. Left kidney: 11.3 cm. Repeat UPCR (11/4) - UPCR 3.2. Large blood noted. MATA in setting of infection related ATN, sickle cell disease and NSAID use.  PLAN:  Off IV fluids now. Only KVO.    Started Lasix 20 q12 (11/7). Pt was transfused pRBC on 11/7.  Continue with the same dose of lasix for now. Monitor UOP.  Please Monitor I/Os and labs.   Avoid nephrotoxins/ NSIADs. Dose medications as per eGFR.  Rest of the management of as per primary team.    Please wait for final reccs from attending. At the time of admission pt had Scr of 1.55. His baseline is ~0.8. MATA initially improved to his baseline Scr with blood transfusions and IV fluids. Pt got CT with IV contrast on 10/25/24. But Scr remained around his baseline till 10/31. Pt has medi port for recurrent transfusions. He was later found to have MRSA bacteremia. On IV antibiotics. Acute chest syndrome was ruled out. Pt was given Toradol on 10/30 and Scr started to rise 1.36 on 10/31 with peak SCr of 2.24 on 11/5. SCr remains elevated but improved to 1.74 today (11/11). USG: Right kidney: 12.4 cm. Left kidney: 11.3 cm. Repeat UPCR (11/4) - UPCR 3.2. Large blood noted. MATA in setting of infection related ATN, sickle cell disease and NSAID use.  PLAN:  Off IV fluids now. Only KVO.    Started Lasix 20 q12 (11/7). Pt was transfused pRBC on 11/7.  Monitor UOP.  Please Monitor I/Os and labs.   Avoid nephrotoxins/ NSIADs. Dose medications as per eGFR.  Rest of the management of as per primary team.    Please wait for final reccs from attending.

## 2024-11-12 LAB
ALBUMIN SERPL ELPH-MCNC: 3.7 G/DL — SIGNIFICANT CHANGE UP (ref 3.3–5)
ALP SERPL-CCNC: 133 U/L — HIGH (ref 40–120)
ALT FLD-CCNC: 55 U/L — HIGH (ref 4–41)
ANION GAP SERPL CALC-SCNC: 15 MMOL/L — HIGH (ref 7–14)
AST SERPL-CCNC: 95 U/L — HIGH (ref 4–40)
BASOPHILS # BLD AUTO: 0.08 K/UL — SIGNIFICANT CHANGE UP (ref 0–0.2)
BASOPHILS NFR BLD AUTO: 0.6 % — SIGNIFICANT CHANGE UP (ref 0–2)
BILIRUB SERPL-MCNC: 5.8 MG/DL — HIGH (ref 0.2–1.2)
BUN SERPL-MCNC: 40 MG/DL — HIGH (ref 7–23)
CALCIUM SERPL-MCNC: 8.3 MG/DL — LOW (ref 8.4–10.5)
CHLORIDE SERPL-SCNC: 98 MMOL/L — SIGNIFICANT CHANGE UP (ref 98–107)
CO2 SERPL-SCNC: 27 MMOL/L — SIGNIFICANT CHANGE UP (ref 22–31)
CREAT SERPL-MCNC: 1.68 MG/DL — HIGH (ref 0.5–1.3)
EGFR: 51 ML/MIN/1.73M2 — LOW
EOSINOPHIL # BLD AUTO: 0.81 K/UL — HIGH (ref 0–0.5)
EOSINOPHIL NFR BLD AUTO: 6.4 % — HIGH (ref 0–6)
GLUCOSE SERPL-MCNC: 93 MG/DL — SIGNIFICANT CHANGE UP (ref 70–99)
HAPTOGLOB SERPL-MCNC: <20 MG/DL — LOW (ref 34–200)
HCT VFR BLD CALC: 20.2 % — CRITICAL LOW (ref 39–50)
HGB BLD-MCNC: 6.9 G/DL — CRITICAL LOW (ref 13–17)
IANC: 6.2 K/UL — SIGNIFICANT CHANGE UP (ref 1.8–7.4)
IMM GRANULOCYTES NFR BLD AUTO: 1.5 % — HIGH (ref 0–0.9)
LDH SERPL L TO P-CCNC: 815 U/L — HIGH (ref 135–225)
LYMPHOCYTES # BLD AUTO: 32.5 % — SIGNIFICANT CHANGE UP (ref 13–44)
LYMPHOCYTES # BLD AUTO: 4.09 K/UL — HIGH (ref 1–3.3)
MAGNESIUM SERPL-MCNC: 1.5 MG/DL — LOW (ref 1.6–2.6)
MCHC RBC-ENTMCNC: 30.1 PG — SIGNIFICANT CHANGE UP (ref 27–34)
MCHC RBC-ENTMCNC: 34.2 G/DL — SIGNIFICANT CHANGE UP (ref 32–36)
MCV RBC AUTO: 88.2 FL — SIGNIFICANT CHANGE UP (ref 80–100)
MONOCYTES # BLD AUTO: 1.21 K/UL — HIGH (ref 0–0.9)
MONOCYTES NFR BLD AUTO: 9.6 % — SIGNIFICANT CHANGE UP (ref 2–14)
NEUTROPHILS # BLD AUTO: 6.2 K/UL — SIGNIFICANT CHANGE UP (ref 1.8–7.4)
NEUTROPHILS NFR BLD AUTO: 49.4 % — SIGNIFICANT CHANGE UP (ref 43–77)
NRBC # BLD: 4 /100 WBCS — HIGH (ref 0–0)
NRBC # FLD: 0.46 K/UL — HIGH (ref 0–0)
PHOSPHATE SERPL-MCNC: 5.4 MG/DL — HIGH (ref 2.5–4.5)
PLATELET # BLD AUTO: 392 K/UL — SIGNIFICANT CHANGE UP (ref 150–400)
POTASSIUM SERPL-MCNC: 3.8 MMOL/L — SIGNIFICANT CHANGE UP (ref 3.5–5.3)
POTASSIUM SERPL-SCNC: 3.8 MMOL/L — SIGNIFICANT CHANGE UP (ref 3.5–5.3)
PROT SERPL-MCNC: 7 G/DL — SIGNIFICANT CHANGE UP (ref 6–8.3)
RBC # BLD: 2.29 M/UL — LOW (ref 4.2–5.8)
RBC # BLD: 2.29 M/UL — LOW (ref 4.2–5.8)
RBC # FLD: 22.3 % — HIGH (ref 10.3–14.5)
RETICS #: 265.2 K/UL — HIGH (ref 25–125)
RETICS/RBC NFR: 11.6 % — HIGH (ref 0.5–2.5)
SODIUM SERPL-SCNC: 140 MMOL/L — SIGNIFICANT CHANGE UP (ref 135–145)
WBC # BLD: 12.58 K/UL — HIGH (ref 3.8–10.5)
WBC # FLD AUTO: 12.58 K/UL — HIGH (ref 3.8–10.5)

## 2024-11-12 PROCEDURE — 99232 SBSQ HOSP IP/OBS MODERATE 35: CPT | Mod: GC

## 2024-11-12 PROCEDURE — 99233 SBSQ HOSP IP/OBS HIGH 50: CPT

## 2024-11-12 RX ORDER — MAGNESIUM SULFATE IN 0.9% NACL 2 G/50 ML
1 INTRAVENOUS SOLUTION, PIGGYBACK (ML) INTRAVENOUS ONCE
Refills: 0 | Status: COMPLETED | OUTPATIENT
Start: 2024-11-12 | End: 2024-11-12

## 2024-11-12 RX ORDER — FUROSEMIDE 40 MG
20 TABLET ORAL
Refills: 0 | Status: COMPLETED | OUTPATIENT
Start: 2024-11-12 | End: 2024-11-13

## 2024-11-12 RX ORDER — HYDRALAZINE HYDROCHLORIDE 50 MG/1
50 TABLET, FILM COATED ORAL THREE TIMES A DAY
Refills: 0 | Status: DISCONTINUED | OUTPATIENT
Start: 2024-11-12 | End: 2024-11-14

## 2024-11-12 RX ADMIN — Medication 20 MILLIGRAM(S): at 13:55

## 2024-11-12 RX ADMIN — Medication 30 MILLILITER(S): at 17:55

## 2024-11-12 RX ADMIN — Medication 2 TABLET(S): at 21:11

## 2024-11-12 RX ADMIN — Medication 30 MILLILITER(S): at 07:40

## 2024-11-12 RX ADMIN — HYDRALAZINE HYDROCHLORIDE 50 MILLIGRAM(S): 50 TABLET, FILM COATED ORAL at 21:11

## 2024-11-12 RX ADMIN — HEPARIN SODIUM 5000 UNIT(S): 10000 INJECTION INTRAVENOUS; SUBCUTANEOUS at 05:29

## 2024-11-12 RX ADMIN — FOLIC ACID 1 MILLIGRAM(S): 1 TABLET ORAL at 13:38

## 2024-11-12 RX ADMIN — HEPARIN SODIUM 5000 UNIT(S): 10000 INJECTION INTRAVENOUS; SUBCUTANEOUS at 21:10

## 2024-11-12 RX ADMIN — Medication 50 MILLIGRAM(S): at 13:38

## 2024-11-12 RX ADMIN — HYDRALAZINE HYDROCHLORIDE 50 MILLIGRAM(S): 50 TABLET, FILM COATED ORAL at 13:55

## 2024-11-12 RX ADMIN — HEPARIN SODIUM 5000 UNIT(S): 10000 INJECTION INTRAVENOUS; SUBCUTANEOUS at 13:37

## 2024-11-12 RX ADMIN — POLYETHYLENE GLYCOL 3350 17 GRAM(S): 17 POWDER, FOR SOLUTION ORAL at 13:37

## 2024-11-12 RX ADMIN — Medication 100 GRAM(S): at 09:18

## 2024-11-12 RX ADMIN — PANTOPRAZOLE SODIUM 40 MILLIGRAM(S): 40 TABLET, DELAYED RELEASE ORAL at 06:01

## 2024-11-12 RX ADMIN — CHLORHEXIDINE GLUCONATE 1 APPLICATION(S): 40 SOLUTION TOPICAL at 05:30

## 2024-11-12 RX ADMIN — Medication 40 MILLIGRAM(S): at 13:55

## 2024-11-12 RX ADMIN — Medication 30 MILLILITER(S): at 19:26

## 2024-11-12 RX ADMIN — Medication 30 MILLILITER(S): at 08:47

## 2024-11-12 RX ADMIN — HYDRALAZINE HYDROCHLORIDE 25 MILLIGRAM(S): 50 TABLET, FILM COATED ORAL at 02:30

## 2024-11-12 RX ADMIN — Medication 30 MILLILITER(S): at 17:20

## 2024-11-12 RX ADMIN — Medication 40 MILLIGRAM(S): at 05:29

## 2024-11-12 NOTE — PROVIDER CONTACT NOTE (CRITICAL VALUE NOTIFICATION) - SITUATION
Critical Value Notification- Hemoglobin 5.8/ Hematocrit 15.9
H/H: 6.3 and 18.4
Hemoglobin 6.5, Hematocrit 18.1
Critical values from am lab: Hg 5.8, Hct 15.9
Pt hemoglobin and hematocrit are low.
Hemoglobin 5.5 and Hematocrit 15.4
Blood cx from oct 25th- aerobic bottle gram positive cocci in clusters
Pt hemoglobin and hematocrit are low.
Received vanco trough result 35.7; however, pre-4th trough was sent 10/27 and this am trough was drawn in error, while pt was receiving 6am dose of Vancomycin most likely. So result not true trough, but rather a peak.
Hemoglobin 6.5, Hematocrit 18.6
Hgb 5.5/ Hct 15.7
Pt hemoglobin and hematocrit are low.
Hemoglobin 6.5 and Hematocrit 18.3
Hgb 4.8, Hct 13.2
HGB 6.9 HCT 20.2
Hemoglobin 6.6, Hematocrit 18.2
Hct 6.5, Hct 18.3, MCV size increased from 82-87
Hemoglobin 6, Hematocrit 17

## 2024-11-12 NOTE — PROGRESS NOTE ADULT - SUBJECTIVE AND OBJECTIVE BOX
[facility] Division of Hospital Medicine  Martine Iglesias MD  Pager [phone]    Patient is a 44y old  Male who presents with a chief complaint of sickle cell crisis       SUBJECTIVE / OVERNIGHT EVENTS: c/o L great toe pain; sickle cell pain improving      MEDICATIONS  (STANDING):  allopurinol 50 milliGRAM(s) Oral daily  chlorhexidine 2% Cloths 1 Application(s) Topical <User Schedule>  Deferasirox (Jadenu) 360mg tablets 2 Tablet(s) 2 Tablet(s) Oral daily  folic acid 1 milliGRAM(s) Oral every 24 hours  furosemide   Injectable 20 milliGRAM(s) IV Push two times a day  heparin   Injectable 5000 Unit(s) SubCutaneous every 8 hours  hydrALAZINE 50 milliGRAM(s) Oral three times a day  HYDROmorphone PCA (1 mG/mL) 30 milliLiter(s) PCA Continuous PCA Continuous  pantoprazole    Tablet 40 milliGRAM(s) Oral before breakfast  polyethylene glycol 3350 17 Gram(s) Oral daily  predniSONE   Tablet 40 milliGRAM(s) Oral daily  senna 2 Tablet(s) Oral at bedtime  sodium chloride 0.45%. 1000 milliLiter(s) (30 mL/Hr) IV Continuous <Continuous>    MEDICATIONS  (PRN):  acetaminophen     Tablet .. 650 milliGRAM(s) Oral every 6 hours PRN Temp greater or equal to 38C (100.4F), Mild Pain (1 - 3)  diphenhydrAMINE 25 milliGRAM(s) Oral every 6 hours PRN Rash and/or Itching  melatonin 3 milliGRAM(s) Oral at bedtime PRN Insomnia  naloxone Injectable 0.1 milliGRAM(s) IV Push every 3 minutes PRN For ANY of the following changes in patient status:  A. RR LESS THAN 10 breaths per minute, B. Oxygen saturation LESS THAN 90%, C. Sedation score of 6  ondansetron    Tablet 4 milliGRAM(s) Oral every 6 hours PRN Nausea and/or Vomiting  ondansetron Injectable 4 milliGRAM(s) IV Push every 6 hours PRN Nausea      PHYSICAL EXAM:  Vital Signs Last 24 Hrs  T(F): 97.4 (12 Nov 2024 10:00), Max: 98.6 (11 Nov 2024 22:04)  HR: 96 (12 Nov 2024 10:00) (93 - 100)  BP: 140/76 (12 Nov 2024 10:00) (140/76 - 175/95)  RR: 18 (12 Nov 2024 10:00) (18 - 18)  SpO2: 100% (12 Nov 2024 10:00) (96% - 100%)    Parameters below as of 12 Nov 2024 10:00  Patient On (Oxygen Delivery Method): room air        CONSTITUTIONAL: NAD, appears comfortable  EYES: PERRLA; conjunctiva and sclera clear  ENMT: Moist oral mucosa; normal dentition  RESPIRATORY: Normal respiratory effort; lungs are clear to auscultation bilaterally  CARDIOVASCULAR: Regular rate and rhythm; ++ lower extremity edema, improving  ABDOMEN: Nontender to palpation, normoactive bowel sounds  MUSCULOSKELETAL:  no clubbing or cyanosis of digits; no joint swelling or tenderness to palpation; L great toe mild erythema, tender to light tough  PSYCH: A+O to person, place, and time; affect appropriate  NEUROLOGY: CN 2-12 are intact and symmetric; no gross sensory deficits   SKIN: No rashes; no palpable lesions    LABS:                        6.9    12.58 )-----------( 392      ( 12 Nov 2024 06:24 )             20.2     11-12    140  |  98  |  40[H]  ----------------------------<  93  3.8   |  27  |  1.68[H]    Ca    8.3[L]      12 Nov 2024 06:24  Phos  5.4     11-12  Mg     1.50     11-12    TPro  7.0  /  Alb  3.7  /  TBili  5.8[H]  /  DBili  x   /  AST  95[H]  /  ALT  55[H]  /  AlkPhos  133[H]  11-12

## 2024-11-12 NOTE — PROGRESS NOTE ADULT - PROBLEM SELECTOR PLAN 1
At the time of admission pt had Scr of 1.55. His baseline is ~0.8. MATA initially improved to his baseline Scr with blood transfusions and IV fluids. Pt got CT with IV contrast on 10/25/24. But Scr remained around his baseline till 10/31. Pt has medi port for recurrent transfusions. He was later found to have MRSA bacteremia. On IV antibiotics. Acute chest syndrome was ruled out. Pt was given Toradol on 10/30 and Scr started to rise 1.36 on 10/31 with peak SCr of 2.24 on 11/5. SCr remains elevated but improved to 1.74  (11/11) and to 1.68 11/12. USG: Right kidney: 12.4 cm. Left kidney: 11.3 cm. Repeat UPCR (11/4) - UPCR 3.2. Large blood noted. MATA in setting of infection related ATN, sickle cell disease and NSAID use.  PLAN:  Off IV fluids now. Only KVO.    C/w Lasix 40 q12.  Monitor UOP.  Please Monitor I/Os and labs.   Avoid nephrotoxins/ NSIADs. Dose medications as per eGFR.  Management of Gout as per primary team.   Rest of the management of as per primary team.

## 2024-11-12 NOTE — PROGRESS NOTE ADULT - SUBJECTIVE AND OBJECTIVE BOX
[facility] Division of Kidney Diseases & Hypertension  FOLLOW UP NOTE  [phone]--------------------------------------------------------------------------------  Chief Complaint: MATA, Sickle cell crisis.    24 hour events/subjective: Pt was seen and examined earlier today. No acute overnight events. Pt reports feeling well. LE edema is improving a bit after increasing the lasix to 40 BID. Pt has gout flare ( Lt greater toe pain with warmth)  Pt denies SOB/ Constipation/ Diarrhea/ Nausea/ Vomiting/ abdominal pain/ chest pain/ tingling/ numbness.     PAST HISTORY  --------------------------------------------------------------------------------  No significant changes to PMH, PSH, FHx, SHx, unless otherwise noted    ALLERGIES & MEDICATIONS  --------------------------------------------------------------------------------  Allergies    ceftriaxone (Anaphylaxis)  piperacillin-tazobactam (Urticaria)  penicillin (Pruritus)  hydroxyurea (Other)    Intolerances      Standing Inpatient Medications  allopurinol 50 milliGRAM(s) Oral daily  chlorhexidine 2% Cloths 1 Application(s) Topical <User Schedule>  Deferasirox (Jadenu) 360mg tablets 2 Tablet(s) 2 Tablet(s) Oral daily  folic acid 1 milliGRAM(s) Oral every 24 hours  furosemide   Injectable 20 milliGRAM(s) IV Push two times a day  heparin   Injectable 5000 Unit(s) SubCutaneous every 8 hours  hydrALAZINE 50 milliGRAM(s) Oral three times a day  HYDROmorphone PCA (1 mG/mL) 30 milliLiter(s) PCA Continuous PCA Continuous  pantoprazole    Tablet 40 milliGRAM(s) Oral before breakfast  polyethylene glycol 3350 17 Gram(s) Oral daily  predniSONE   Tablet 40 milliGRAM(s) Oral daily  senna 2 Tablet(s) Oral at bedtime  sodium chloride 0.45%. 1000 milliLiter(s) IV Continuous <Continuous>    PRN Inpatient Medications  acetaminophen     Tablet .. 650 milliGRAM(s) Oral every 6 hours PRN  diphenhydrAMINE 25 milliGRAM(s) Oral every 6 hours PRN  melatonin 3 milliGRAM(s) Oral at bedtime PRN  naloxone Injectable 0.1 milliGRAM(s) IV Push every 3 minutes PRN  ondansetron    Tablet 4 milliGRAM(s) Oral every 6 hours PRN  ondansetron Injectable 4 milliGRAM(s) IV Push every 6 hours PRN      REVIEW OF SYSTEMS  --------------------------------------------------------------------------------  as noted above.     All other systems were reviewed and are negative, except as noted.    VITALS/PHYSICAL EXAM  --------------------------------------------------------------------------------  T(C): 36.3 (11-12-24 @ 10:00), Max: 37 (11-11-24 @ 22:04)  HR: 96 (11-12-24 @ 10:00) (93 - 100)  BP: 140/76 (11-12-24 @ 10:00) (140/76 - 175/95)  RR: 18 (11-12-24 @ 10:00) (18 - 18)  SpO2: 100% (11-12-24 @ 10:00) (96% - 100%)  Wt(kg): --        11-11-24 @ 07:01  -  11-12-24 @ 07:00  --------------------------------------------------------  IN: 700 mL / OUT: 0 mL / NET: 700 mL      Physical Exam:  Gen: NAD  HEENT: MMM  Pulm: Lower zone decreased breath sounds   CV: S1S2  Abd: Soft, +BS   Ext: Pitting LE edema B/L, L greater toe warmth and tenderness.   Neuro: Awake  Skin: Warm and dry  Vascular access: Mediport RIJ and peripheral IVs    LABS/STUDIES  --------------------------------------------------------------------------------              6.9    12.58 >-----------<  392      [11-12-24 @ 06:24]              20.2     140  |  98  |  40  ----------------------------<  93      [11-12-24 @ 06:24]  3.8   |  27  |  1.68        Ca     8.3     [11-12-24 @ 06:24]      Mg     1.50     [11-12-24 @ 06:24]      Phos  5.4     [11-12-24 @ 06:24]    TPro  7.0  /  Alb  3.7  /  TBili  5.8  /  DBili  x   /  AST  95  /  ALT  55  /  AlkPhos  133  [11-12-24 @ 06:24]              [11-12-24 @ 06:24]    Creatinine Trend:  SCr 1.68 [11-12 @ 06:24]  SCr 1.84 [11-11 @ 05:30]  SCr 2.03 [11-09 @ 06:57]  SCr 2.10 [11-08 @ 06:45]  SCr 2.19 [11-07 @ 13:34]

## 2024-11-12 NOTE — PROVIDER CONTACT NOTE (CRITICAL VALUE NOTIFICATION) - TEST AND RESULT REPORTED:
HGB 6.9 HCT 20.2
Hemoglobin and Hematocrit
Hgb and Hct
Hemoglobin 6.5, Hematocrit 18.1
Hgb 4.8, Hct 13.2
H/H: 6.3 and 18.4
Hemoglobin 6.6, Hematocrit 18.2
Hgb 5.5/ Hct 15.7
Blood cx from oct 25th- aerobic bottle gram positive cocci in clusters
Hemoglobin 6.5 and Hematocrit 18.3
Hemoglobin and Hematocrit
Hemoglobin 5.5 and Hematocrit 15.4
Hemoglobin and Hematocrit
Vanco trough result 35.7
Hemoglobin 6, Hematocrit 17
Hemoglobin 5.8, Hematocrit 15.9
Hemoglobin 6.5, Hematocrit 18.6
Hemoglobin 5.8/ Hematocrit 15.9

## 2024-11-12 NOTE — PROVIDER CONTACT NOTE (CRITICAL VALUE NOTIFICATION) - BACKGROUND
Pt with SCC.
44 Y.O M admitted for sickle cell pain and SOB. PMHx of Gout, Sickle Cell Anemia
Pt admitted for shortness of breath
44 year-old male patient past medical history sickle cell constipation presents to ED for low hemoglobin 5.6 and was sent here for transfusion.
Admitted with SCC, pt received 1u PRBC on night of 10/29; on 10/30, Hg 6.5. No signs of acute bleed noted.
Admitted with SCC
Pt scc, received 1UPRBC 11/7
44 year-old male patient past medical history sickle cell constipation presents to ED for low hemoglobin 5.6 and was sent here for transfusion.
Admitted with anemia, scc
Admitted with anemia, scc
Pt admitted d/t shortness of breath. Pt has a PMH of gout and sickle cell anemia.
Admitted with anemia, h/o SCC
Pt admitted with SOB and sickle cell crisis.
pt admitted for SOB and anemia. pt on PCA pump for sickle cell dx
44 year-old male pt w/PMH of sickle cell, constipation presents to ED for low hemoglobin 5.6 and was sent here for transfusion.
Admitted with scc
Pt admitted for Sickle Cell Crisis and worsening SOB. PMHx Sickle Cell Anemia and gout.

## 2024-11-12 NOTE — PROVIDER CONTACT NOTE (CRITICAL VALUE NOTIFICATION) - PERSON GIVING RESULT:
Augusto, Chilel
aSnia R
ÁNGEL Flores, Lab
Agusto Cleaning / Toxicology
GUCCI Espinosa
IGNACIA Murphy
Elsa S/ Hematology
OTILIO Boswell/ Dennis
Sania R
Trung, / Lab
ОЛЬГА Chaudhary, Lab
IGNACIA Hunter
XAVIER Douglass
IGNACIA Hunter
OTILIO Sanders / Lab
Patricia Goncalves
MOSHE Mckeon / Dennis
XAVIER Douglass

## 2024-11-12 NOTE — PROVIDER CONTACT NOTE (CRITICAL VALUE NOTIFICATION) - ASSESSMENT
Pt AOx4, resting comfortably in bed with no acute distress noted.
A&Ox4
Hematocrit: 5.8  Hemoglobin: 16.1
A&Ox4
No signs or symptoms. Pt resting comfortably in bed
A&Ox4
A&Ox4, VSS
A&Ox4, on PCA pump. VSS.
Pt AOx4. VSS. Pt denies SOB or lightheadedness.
AOx4. Asymptomatic. Patient currently on PCA pump for lower back sickle cell pain.
PT AOx4. VSS. on pca pump. No sob, chest pain or lightheadedness.
Pt is lying down in the bed. Pt is asymptomatic with no changes in status.
A&Ox4
Hemoglobin: 5.4  Hematocrit: 15
Pt AOx4, VSS with no acute distress noted. Resting comfortably in bed.
No active signs of bleeding. Pt is asymptomatic. No c/o lightheadedness, fatigue.
pt having no s/s at this time. vitals are stable

## 2024-11-12 NOTE — PROVIDER CONTACT NOTE (CRITICAL VALUE NOTIFICATION) - NAME OF MD/NP/PA/DO NOTIFIED:
Gladys Hernandez, ACP
Rishi Sung (while in IDR)
Isaak Grover
Rishi Sung
Baljit Scott
Dariana Chilel ACP
Latisha Lim
RENNY Marshall
RENNY Marshall
Anna Bartholomew
Azeb Herman, ACP
ACP provider Patrizia Quintanilla (PA)
Baljit Scott, PA
Isaak Grover
IGNACIA Quintanilla
Azeb Herman
RENNY Scott

## 2024-11-13 LAB
ALBUMIN SERPL ELPH-MCNC: 3.7 G/DL — SIGNIFICANT CHANGE UP (ref 3.3–5)
ALP SERPL-CCNC: 131 U/L — HIGH (ref 40–120)
ALT FLD-CCNC: 61 U/L — HIGH (ref 4–41)
ANION GAP SERPL CALC-SCNC: 14 MMOL/L — SIGNIFICANT CHANGE UP (ref 7–14)
AST SERPL-CCNC: 104 U/L — HIGH (ref 4–40)
BASOPHILS # BLD AUTO: 0.01 K/UL — SIGNIFICANT CHANGE UP (ref 0–0.2)
BASOPHILS NFR BLD AUTO: 0.1 % — SIGNIFICANT CHANGE UP (ref 0–2)
BILIRUB SERPL-MCNC: 6.3 MG/DL — HIGH (ref 0.2–1.2)
BUN SERPL-MCNC: 42 MG/DL — HIGH (ref 7–23)
CALCIUM SERPL-MCNC: 9 MG/DL — SIGNIFICANT CHANGE UP (ref 8.4–10.5)
CHLORIDE SERPL-SCNC: 98 MMOL/L — SIGNIFICANT CHANGE UP (ref 98–107)
CO2 SERPL-SCNC: 26 MMOL/L — SIGNIFICANT CHANGE UP (ref 22–31)
CREAT SERPL-MCNC: 1.57 MG/DL — HIGH (ref 0.5–1.3)
EGFR: 55 ML/MIN/1.73M2 — LOW
EOSINOPHIL # BLD AUTO: 0.01 K/UL — SIGNIFICANT CHANGE UP (ref 0–0.5)
EOSINOPHIL NFR BLD AUTO: 0.1 % — SIGNIFICANT CHANGE UP (ref 0–6)
GLUCOSE SERPL-MCNC: 99 MG/DL — SIGNIFICANT CHANGE UP (ref 70–99)
HAPTOGLOB SERPL-MCNC: <20 MG/DL — LOW (ref 34–200)
HCT VFR BLD CALC: 22.3 % — LOW (ref 39–50)
HGB BLD-MCNC: 7.8 G/DL — LOW (ref 13–17)
IANC: 4.77 K/UL — SIGNIFICANT CHANGE UP (ref 1.8–7.4)
IMM GRANULOCYTES NFR BLD AUTO: 1.2 % — HIGH (ref 0–0.9)
LDH SERPL L TO P-CCNC: 810 U/L — HIGH (ref 135–225)
LYMPHOCYTES # BLD AUTO: 1.57 K/UL — SIGNIFICANT CHANGE UP (ref 1–3.3)
LYMPHOCYTES # BLD AUTO: 20.2 % — SIGNIFICANT CHANGE UP (ref 13–44)
MAGNESIUM SERPL-MCNC: 1.8 MG/DL — SIGNIFICANT CHANGE UP (ref 1.6–2.6)
MCHC RBC-ENTMCNC: 30.5 PG — SIGNIFICANT CHANGE UP (ref 27–34)
MCHC RBC-ENTMCNC: 35 G/DL — SIGNIFICANT CHANGE UP (ref 32–36)
MCV RBC AUTO: 87.1 FL — SIGNIFICANT CHANGE UP (ref 80–100)
MONOCYTES # BLD AUTO: 1.34 K/UL — HIGH (ref 0–0.9)
MONOCYTES NFR BLD AUTO: 17.2 % — HIGH (ref 2–14)
NEUTROPHILS # BLD AUTO: 4.77 K/UL — SIGNIFICANT CHANGE UP (ref 1.8–7.4)
NEUTROPHILS NFR BLD AUTO: 61.2 % — SIGNIFICANT CHANGE UP (ref 43–77)
NRBC # BLD: 4 /100 WBCS — HIGH (ref 0–0)
NRBC # FLD: 0.29 K/UL — HIGH (ref 0–0)
PHOSPHATE SERPL-MCNC: 5 MG/DL — HIGH (ref 2.5–4.5)
PLATELET # BLD AUTO: 326 K/UL — SIGNIFICANT CHANGE UP (ref 150–400)
POTASSIUM SERPL-MCNC: 4 MMOL/L — SIGNIFICANT CHANGE UP (ref 3.5–5.3)
POTASSIUM SERPL-SCNC: 4 MMOL/L — SIGNIFICANT CHANGE UP (ref 3.5–5.3)
PROT SERPL-MCNC: 7.3 G/DL — SIGNIFICANT CHANGE UP (ref 6–8.3)
RBC # BLD: 2.56 M/UL — LOW (ref 4.2–5.8)
RBC # BLD: 2.56 M/UL — LOW (ref 4.2–5.8)
RBC # FLD: 21.1 % — HIGH (ref 10.3–14.5)
RETICS #: 297 K/UL — HIGH (ref 25–125)
RETICS/RBC NFR: 11.6 % — HIGH (ref 0.5–2.5)
SODIUM SERPL-SCNC: 138 MMOL/L — SIGNIFICANT CHANGE UP (ref 135–145)
WBC # BLD: 7.79 K/UL — SIGNIFICANT CHANGE UP (ref 3.8–10.5)
WBC # FLD AUTO: 7.79 K/UL — SIGNIFICANT CHANGE UP (ref 3.8–10.5)

## 2024-11-13 PROCEDURE — 99232 SBSQ HOSP IP/OBS MODERATE 35: CPT

## 2024-11-13 PROCEDURE — 99232 SBSQ HOSP IP/OBS MODERATE 35: CPT | Mod: GC

## 2024-11-13 RX ORDER — FUROSEMIDE 40 MG
40 TABLET ORAL DAILY
Refills: 0 | Status: DISCONTINUED | OUTPATIENT
Start: 2024-11-14 | End: 2024-11-14

## 2024-11-13 RX ADMIN — HEPARIN SODIUM 5000 UNIT(S): 10000 INJECTION INTRAVENOUS; SUBCUTANEOUS at 07:16

## 2024-11-13 RX ADMIN — Medication 30 MILLILITER(S): at 23:57

## 2024-11-13 RX ADMIN — Medication 20 MILLIGRAM(S): at 13:53

## 2024-11-13 RX ADMIN — HYDRALAZINE HYDROCHLORIDE 50 MILLIGRAM(S): 50 TABLET, FILM COATED ORAL at 13:53

## 2024-11-13 RX ADMIN — POLYETHYLENE GLYCOL 3350 17 GRAM(S): 17 POWDER, FOR SOLUTION ORAL at 11:24

## 2024-11-13 RX ADMIN — HEPARIN SODIUM 5000 UNIT(S): 10000 INJECTION INTRAVENOUS; SUBCUTANEOUS at 13:53

## 2024-11-13 RX ADMIN — Medication 2 TABLET(S): at 21:25

## 2024-11-13 RX ADMIN — Medication 40 MILLIGRAM(S): at 07:15

## 2024-11-13 RX ADMIN — Medication 30 MILLILITER(S): at 02:08

## 2024-11-13 RX ADMIN — Medication 20 MILLIGRAM(S): at 07:15

## 2024-11-13 RX ADMIN — Medication 30 MILLILITER(S): at 08:10

## 2024-11-13 RX ADMIN — FOLIC ACID 1 MILLIGRAM(S): 1 TABLET ORAL at 11:24

## 2024-11-13 RX ADMIN — HEPARIN SODIUM 5000 UNIT(S): 10000 INJECTION INTRAVENOUS; SUBCUTANEOUS at 21:25

## 2024-11-13 RX ADMIN — CHLORHEXIDINE GLUCONATE 1 APPLICATION(S): 40 SOLUTION TOPICAL at 07:23

## 2024-11-13 RX ADMIN — Medication 30 MILLILITER(S): at 19:45

## 2024-11-13 RX ADMIN — Medication 30 MILLILITER(S): at 13:51

## 2024-11-13 RX ADMIN — HYDRALAZINE HYDROCHLORIDE 50 MILLIGRAM(S): 50 TABLET, FILM COATED ORAL at 07:14

## 2024-11-13 RX ADMIN — PANTOPRAZOLE SODIUM 40 MILLIGRAM(S): 40 TABLET, DELAYED RELEASE ORAL at 07:29

## 2024-11-13 RX ADMIN — Medication 50 MILLIGRAM(S): at 11:25

## 2024-11-13 RX ADMIN — HYDRALAZINE HYDROCHLORIDE 50 MILLIGRAM(S): 50 TABLET, FILM COATED ORAL at 21:25

## 2024-11-13 RX ADMIN — Medication 30 MILLILITER(S): at 13:49

## 2024-11-13 NOTE — PROGRESS NOTE ADULT - PROBLEM SELECTOR PROBLEM 4
Gout
Nausea and vomiting
Gout
Nausea and vomiting
Gout

## 2024-11-13 NOTE — PROGRESS NOTE ADULT - PROBLEM SELECTOR PLAN 2
- Hb 5.2 on presentation, baseline Hb 6-6.5  - Per chart review: s/p 1 units of pRBCs on 10/25, 10/29, 11/4, 11/7  - caution with excessive transfusion in setting of hemochromatosis – c/w home chelating agents   - Continue PCA - 0.75mg demand/6min/20mg (4-h limit)  - Monitor pain - trend SC labs.  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  also HA, CT head neg

## 2024-11-13 NOTE — PROGRESS NOTE ADULT - PROBLEM SELECTOR PROBLEM 6
Transaminitis
Prophylactic measure
Transaminitis
Prophylactic measure
Transaminitis

## 2024-11-13 NOTE — PROGRESS NOTE ADULT - PROBLEM SELECTOR PROBLEM 2
Anemia, sickle cell with crisis
Pain managed using patient-controlled analgesia (PCA)
Anemia, sickle cell with crisis
Pain managed using patient-controlled analgesia (PCA)
Anemia, sickle cell with crisis

## 2024-11-13 NOTE — PROGRESS NOTE ADULT - PROBLEM SELECTOR PLAN 1
At the time of admission pt had Scr of 1.55. His baseline is ~0.8. MATA initially improved to his baseline Scr with blood transfusions and IV fluids. Pt got CT with IV contrast on 10/25/24. But Scr remained around his baseline till 10/31. Pt has medi port for recurrent transfusions. He was later found to have MRSA bacteremia. On IV antibiotics. Acute chest syndrome was ruled out. Pt was given Toradol on 10/30 and Scr started to rise 1.36 on 10/31 with peak SCr of 2.24 on 11/5. SCr remains elevated but improved to 1.74  (11/11) --> 1.68 11/12--> 1.54 11/13 today. USG: Right kidney: 12.4 cm. Left kidney: 11.3 cm. Repeat UPCR (11/4) - UPCR 3.2. Large blood noted. MATA in setting of infection related ATN, sickle cell disease and NSAID use.  PLAN:  Off IV fluids now. Only KVO.    C/w Lasix 20 q12. Can switch to PO lasix tomorrow.   Please Monitor I/Os and labs.   Avoid nephrotoxins/ NSIADs. Dose medications as per eGFR.  Management of Gout as per primary team. Pt is on pred now. At the time of admission pt had Scr of 1.55. His baseline is ~0.8. MATA initially improved to his baseline Scr with blood transfusions and IV fluids. Pt got CT with IV contrast on 10/25/24. But Scr remained around his baseline till 10/31. Pt has medi port for recurrent transfusions. He was later found to have MRSA bacteremia. On IV antibiotics. Acute chest syndrome was ruled out. Pt was given Toradol on 10/30 and Scr started to rise 1.36 on 10/31 with peak SCr of 2.24 on 11/5. SCr remains elevated but improved to 1.74  (11/11) --> 1.68 11/12--> 1.54 11/13 today. USG: Right kidney: 12.4 cm. Left kidney: 11.3 cm. Repeat UPCR (11/4) - UPCR 3.2. Large blood noted. MATA in setting of infection related ATN, sickle cell disease and NSAID use.  PLAN:  Off IV fluids now. Only KVO.    C/w Lasix 20 IV q12. Can switch to 40mg PO lasix tomorrow.   Please Monitor I/Os and labs.   Avoid nephrotoxins/ NSIADs. Dose medications as per eGFR.  Management of Gout as per primary team. Pt is on pred now.

## 2024-11-13 NOTE — PROGRESS NOTE ADULT - PROBLEM SELECTOR PLAN 4
- c/w home allopurinol, dose renally
has nausea, unrelated to food. associated with abd pain. no fever.   abdominal exam was unremarkable in absence of pain at the time.   T bili elevated, with mildly elevated alkpho. nl LFT.   - US neg for biliary obstruction.   -c/w famotidine  -zofran prn.  - nausea resolved with cessation of Toradol. likely NSAID induced gastritis.
- c/w home allopurinol, dose renally
- c/w home allopurinol, dose renally
- c/w home allopurinol, dose renally  another gout flare in setting of increased diuretics  will give another short course of pred in setting of improving MATA, cont to avoid NSAIDs  diuretics reduced back to previous dose
- c/w home allopurinol, dose renally
- c/w home allopurinol, dose renally  seems to be in gout flare, due to renal function will give pred x 3 days
- c/w home allopurinol, dose renally
- c/w home allopurinol, dose renally
- c/w home allopurinol, dose renally  seems to be in gout flare, due to renal function will give pred x 3 days
- c/w home allopurinol, dose renally  seems to be in gout flare, due to renal function will give pred x 3 days  resolved
- c/w home allopurinol, dose renally  seems to be in gout flare, due to renal function will give pred x 3 days  resolved
- c/w home allopurinol, dose renally  another gout flare in setting of increased diuretics  will give another short course of pred in setting of improving MATA, cont to avoid NSAIDs  diuretics reduced back to previous dose
- c/w home allopurinol, dose renally  seems to be in gout flare, due to renal function will give pred x 3 days
- c/w home allopurinol, dose renally
- c/w home allopurinol, dose renally  seems to be in gout flare, due to renal function will give pred x 3 days  resolved
has nausea, unrelated to food. associated with abd pain. no fever.   abdominal exam was unremarkable in absence of pain at the time.   T bili elevated, with mildly elevated alkpho. nl LFT.   - US neg for biliary obstruction.   -c/w famotidine  -zofran prn.  - nausea resolved with cessation of Toradol. likely NSAID induced gastritis.

## 2024-11-13 NOTE — PROGRESS NOTE ADULT - PROBLEM SELECTOR PROBLEM 3
Acute respiratory failure with hypoxia
Acute respiratory failure with hypoxia
Liver cirrhosis
Acute respiratory failure with hypoxia
Liver cirrhosis
Acute respiratory failure with hypoxia

## 2024-11-13 NOTE — PROGRESS NOTE ADULT - SUBJECTIVE AND OBJECTIVE BOX
[facility] Division of Hospital Medicine  Shiraz Soliz) MD Srinivasa  Pager [phone]    SUBJECTIVE:  Chief complaint: VOC.    Pt was seen and evaluated at bedside this Am. No o/n events. Dnies any SOB/Cp/NV. States his pain has improved. His LE swelling has improved as well.       ROS: All systems negative except as noted.      Vital Signs Last 24 Hrs  T(C): 36.6 (13 Nov 2024 13:10), Max: 36.6 (13 Nov 2024 10:15)  T(F): 97.8 (13 Nov 2024 13:10), Max: 97.9 (13 Nov 2024 10:15)  HR: 105 (13 Nov 2024 13:10) (84 - 105)  BP: 173/84 (13 Nov 2024 13:10) (154/88 - 173/84)  BP(mean): --  RR: 19 (13 Nov 2024 13:10) (16 - 19)  SpO2: 99% (13 Nov 2024 13:10) (98% - 100%)    Parameters below as of 13 Nov 2024 13:10  Patient On (Oxygen Delivery Method): room air      PHYSICAL EXAM:  Gen- In bed, NAD  Resp- CTAB, good effort.  CVS- RRR, S1S2, +BLE edema - improved from prior.  GI- Soft abd, NT, ND, +BSx4  Ext- No C/C.   Neuro- CN II-XII intact. Speech fluent/face symmetric.      MEDICATION:  MEDICATIONS  (STANDING):  allopurinol 50 milliGRAM(s) Oral daily  chlorhexidine 2% Cloths 1 Application(s) Topical <User Schedule>  Deferasirox (Jadenu) 360mg tablets 2 Tablet(s) 2 Tablet(s) Oral daily  folic acid 1 milliGRAM(s) Oral every 24 hours  heparin   Injectable 5000 Unit(s) SubCutaneous every 8 hours  hydrALAZINE 50 milliGRAM(s) Oral three times a day  HYDROmorphone PCA (1 mG/mL) 30 milliLiter(s) PCA Continuous PCA Continuous  pantoprazole    Tablet 40 milliGRAM(s) Oral before breakfast  polyethylene glycol 3350 17 Gram(s) Oral daily  predniSONE   Tablet 40 milliGRAM(s) Oral daily  senna 2 Tablet(s) Oral at bedtime  sodium chloride 0.45%. 1000 milliLiter(s) (30 mL/Hr) IV Continuous <Continuous>    MEDICATIONS  (PRN):  acetaminophen     Tablet .. 650 milliGRAM(s) Oral every 6 hours PRN Temp greater or equal to 38C (100.4F), Mild Pain (1 - 3)  diphenhydrAMINE 25 milliGRAM(s) Oral every 6 hours PRN Rash and/or Itching  melatonin 3 milliGRAM(s) Oral at bedtime PRN Insomnia  naloxone Injectable 0.1 milliGRAM(s) IV Push every 3 minutes PRN For ANY of the following changes in patient status:  A. RR LESS THAN 10 breaths per minute, B. Oxygen saturation LESS THAN 90%, C. Sedation score of 6  ondansetron    Tablet 4 milliGRAM(s) Oral every 6 hours PRN Nausea and/or Vomiting  ondansetron Injectable 4 milliGRAM(s) IV Push every 6 hours PRN Nausea            LABORATORY:                          7.8    7.79  )-----------( 326      ( 13 Nov 2024 06:42 )             22.3     11-13    138  |  98  |  42[H]  ----------------------------<  99  4.0   |  26  |  1.57[H]    Ca    9.0      13 Nov 2024 06:42  Phos  5.0     11-13  Mg     1.80     11-13    TPro  7.3  /  Alb  3.7  /  TBili  6.3[H]  /  DBili  x   /  AST  104[H]  /  ALT  61[H]  /  AlkPhos  131[H]  11-13      Urinalysis Basic - ( 13 Nov 2024 06:42 )    Color: x / Appearance: x / SG: x / pH: x  Gluc: 99 mg/dL / Ketone: x  / Bili: x / Urobili: x   Blood: x / Protein: x / Nitrite: x   Leuk Esterase: x / RBC: x / WBC x   Sq Epi: x / Non Sq Epi: x / Bacteria: x

## 2024-11-13 NOTE — PROGRESS NOTE ADULT - PROBLEM SELECTOR PLAN 5
- Upr/cr ratio - 3.2. Multifactorial -- secondary to VOC, supratherapeutic vanco. Concern for component of ATN.  -Renal US - normal  - Avoid nephrotoxins.  - Trend labs.  -Appreciate neph recs.  improving  -convert to PO lasix in AM.  -D/w Nephro attending.    #Metabolic acidosis   -In setting of MATA  bicarb now stopped, cont to monitor

## 2024-11-13 NOTE — PROGRESS NOTE ADULT - PROBLEM SELECTOR PROBLEM 5
MATA (acute kidney injury)
MATA (acute kidney injury)
Dyspnea on exertion
MATA (acute kidney injury)
Dyspnea on exertion
MATA (acute kidney injury)

## 2024-11-13 NOTE — PROGRESS NOTE ADULT - PROBLEM SELECTOR PLAN 6
- In setting of cirrhosis and hemochromatosis, as previously known and visualized on CTAP  - hepatology consulted, EBV/CMV negative. hepatitis panel negative   - hepatology to set up outpatient followup appt, no further workup inpatient
Lovenox for dvt prophylaxis, patient is hypercoagulable in the setting of sickle cell disease
Lovenox for dvt prophylaxis, patient is hypercoagulable in the setting of sickle cell disease
-  in setting of cirrhosis and hemochromatosis, as previously known and visualized on CTAP  -hepatology consulted, EBV/CMV negative. hepatitis panel negative   - trend for now  - will defer diuretics for now given MATA    Some edema noted on b/l LE, will still offer hypotonic IVF but will need to consider hepatorenal etiology.
- In setting of cirrhosis and hemochromatosis, as previously known and visualized on CTAP  - hepatology consulted, EBV/CMV negative. hepatitis panel negative   - hepatology to set up outpatient followup appt, no further workup inpatient
-  in setting of cirrhosis and hemochromatosis, as previously known and visualized on CTAP  - hepatology consulted, EBV/CMV negative. hepatitis panel negative   - hepatology to set up outpatient followup appt, no further workup inpatient

## 2024-11-13 NOTE — PROGRESS NOTE ADULT - SUBJECTIVE AND OBJECTIVE BOX
patient seen today, clinically improved  H&H 7.8/22.3, cr 1.57  on lasix 40 q 12 hrs      MEDICATIONS  (STANDING):  allopurinol 50 milliGRAM(s) Oral daily  chlorhexidine 2% Cloths 1 Application(s) Topical <User Schedule>  Deferasirox (Jadenu) 360mg tablets 2 Tablet(s) 2 Tablet(s) Oral daily  folic acid 1 milliGRAM(s) Oral every 24 hours  furosemide   Injectable 20 milliGRAM(s) IV Push two times a day  heparin   Injectable 5000 Unit(s) SubCutaneous every 8 hours  hydrALAZINE 50 milliGRAM(s) Oral three times a day  HYDROmorphone PCA (1 mG/mL) 30 milliLiter(s) PCA Continuous PCA Continuous  pantoprazole    Tablet 40 milliGRAM(s) Oral before breakfast  polyethylene glycol 3350 17 Gram(s) Oral daily  predniSONE   Tablet 40 milliGRAM(s) Oral daily  senna 2 Tablet(s) Oral at bedtime  sodium chloride 0.45%. 1000 milliLiter(s) (30 mL/Hr) IV Continuous <Continuous>    MEDICATIONS  (PRN):  acetaminophen     Tablet .. 650 milliGRAM(s) Oral every 6 hours PRN Temp greater or equal to 38C (100.4F), Mild Pain (1 - 3)  diphenhydrAMINE 25 milliGRAM(s) Oral every 6 hours PRN Rash and/or Itching  melatonin 3 milliGRAM(s) Oral at bedtime PRN Insomnia  naloxone Injectable 0.1 milliGRAM(s) IV Push every 3 minutes PRN For ANY of the following changes in patient status:  A. RR LESS THAN 10 breaths per minute, B. Oxygen saturation LESS THAN 90%, C. Sedation score of 6  ondansetron    Tablet 4 milliGRAM(s) Oral every 6 hours PRN Nausea and/or Vomiting  ondansetron Injectable 4 milliGRAM(s) IV Push every 6 hours PRN Nausea      Vital Signs Last 24 Hrs  T(C): 36.4 (13 Nov 2024 09:15), Max: 36.7 (12 Nov 2024 13:55)  T(F): 97.6 (13 Nov 2024 09:15), Max: 98.1 (12 Nov 2024 13:55)  HR: 98 (13 Nov 2024 09:15) (84 - 103)  BP: 169/88 (13 Nov 2024 09:15) (154/88 - 169/99)  BP(mean): --  RR: 17 (13 Nov 2024 09:15) (17 - 18)  SpO2: 100% (13 Nov 2024 09:15) (99% - 100%)    Parameters below as of 13 Nov 2024 09:15  Patient On (Oxygen Delivery Method): room air        PE  NAD  Awake, alert  Anicteric, MMM  RRR  CTAB  Abd soft, NT, ND  No c/c/e  No rash grossly                            7.8    7.79  )-----------( 326      ( 13 Nov 2024 06:42 )             22.3       11-13    138  |  98  |  42[H]  ----------------------------<  99  4.0   |  26  |  1.57[H]    Ca    9.0      13 Nov 2024 06:42  Phos  5.0     11-13  Mg     1.80     11-13    TPro  7.3  /  Alb  3.7  /  TBili  6.3[H]  /  DBili  x   /  AST  104[H]  /  ALT  61[H]  /  AlkPhos  131[H]  11-13

## 2024-11-13 NOTE — PROGRESS NOTE ADULT - PROBLEM SELECTOR PLAN 3
- in setting of atelectasis as seen on CTA chest. likely sequelae of known prolonged sickle cell dz   - no evidence of acute chest syndrome  - incentive spirometry  - wean O2 as tolerated  - o2 intermittently desats to 88-89%, cont to reassess O2 needs as we approach DC.  - leukocytosis noted, on admission, now resolved
- in setting of atelectasis as seen on CTA chest. likely sequelae of known prolonged sickle cell dz   - no evidence of acute chest syndrome  - incentive spirometry  pt with intermittent desats, pulse ox moved to ear, 100%, cont to monitor  - leukocytosis noted, on admission, now resolved
- in setting of atelectasis as seen on CTA chest. likely sequelae of known prolonged sickle cell dz   - no evidence of acute chest syndrome  - incentive spirometry  - wean O2 as tolerated  - o2 intermittently desats to 88-89%, will need to obtain ambulatory sats   - leukocytosis noted, on admission, now resolved
CT on admission showed nodular liver  US showing cirrhosis.   T. bili elevated  INR wnl.   hepatology consult, appreciate recs, will check viral and autoimmune serologies  - c/w lasix 40mg po qd and spironolactone 100mg po qd  - c/w Allopurinol 100mg qd give h/o gout. UC 8.8.
- in setting of atelectasis as seen on CTA chest. likely sequelae of known prolonged sickle cell dz   - no evidence of acute chest syndrome  - incentive spirometry  pt with intermittent desats, pulse ox moved to ear, 100%, cont to monitor  - leukocytosis likely reactive    now back on RA
- in setting of atelectasis as seen on CTA chest. likely sequelae of known prolonged sickle cell dz   - no evidence of acute chest syndrome  - incentive spirometry  pt with intermittent desats, pulse ox moved to ear, 100%, cont to monitor  - leukocytosis noted, on admission, now resolved
CT on admission showed nodular liver  US showing cirrhosis.   T. bili elevated  INR wnl.   hepatology consult, appreciate recs, will check viral and autoimmune serologies  - c/w lasix 40mg po qd and spironolactone 100mg po qd  - c/w Allopurinol 100mg qd give h/o gout. UC 8.8.
- in setting of atelectasis as seen on CTA chest. likely sequelae of known prolonged sickle cell dz   - no evidence of acute chest syndrome  - incentive spirometry  - wean O2 as tolerated  - o2 intermittently desats to 88-89%, will need to obtain ambulatory sats   - leukocytosis noted, on admission, now resolved
- in setting of atelectasis as seen on CTA chest. likely sequelae of known prolonged sickle cell dz   - no evidence of acute chest syndrome  - incentive spirometry  - wean O2 as tolerated  - o2 intermittently desats to 88-89%, will need to obtain ambulatory sats to access need for home O2   - leukocytosis noted, on admission, now resolved
- in setting of atelectasis as seen on CTA chest. likely sequelae of known prolonged sickle cell dz   - no evidence of acute chest syndrome  - incentive spirometry  pt with intermittent desats, pulse ox moved to ear, 100%, cont to monitor  - leukocytosis likely reactive    now back on RA
- in setting of atelectasis as seen on CTA chest. likely sequelae of known prolonged sickle cell dz   - no evidence of acute chest syndrome  - incentive spirometry  - wean O2 as tolerated  - o2 intermittently desats to 88-89%, will need to obtain ambulatory sats   - leukocytosis noted, on admission, now resolved
- in setting of atelectasis as seen on CTA chest. likely sequelae of known prolonged sickle cell dz   - no evidence of acute chest syndrome  - incentive spirometry  - wean O2 as tolerated  - o2 intermittently desats to 88-89%, will need to obtain ambulatory sats   - leukocytosis noted, on admission, now resolved
- in setting of atelectasis as seen on CTA chest  - no evidence of acute chest syndrome  - incentive spirometry  - wean O2 as tolerated  - leukocytosis noted, on admission, now resolved
- in setting of atelectasis as seen on CTA chest. likely sequelae of known prolonged sickle cell dz   - no evidence of acute chest syndrome  - incentive spirometry  - wean O2 as tolerated  - o2 intermittently desats to 88-89%, will need to obtain ambulatory sats   - leukocytosis noted, on admission, now resolved
- in setting of atelectasis as seen on CTA chest. likely sequelae of known prolonged sickle cell dz   - no evidence of acute chest syndrome  - incentive spirometry  pt with intermittent desats, pulse ox moved to ear, 100%, cont to monitor  - leukocytosis noted, on admission, now resolved
- in setting of atelectasis as seen on CTA chest. likely sequelae of known prolonged sickle cell dz   - no evidence of acute chest syndrome  - incentive spirometry  pt with intermittent desats, pulse ox moved to ear, 100%, cont to monitor  - leukocytosis likely reactive    now back on RA
- in setting of atelectasis as seen on CTA chest. likely sequelae of known prolonged sickle cell dz   - no evidence of acute chest syndrome  - incentive spirometry  - wean O2 as tolerated  - o2 intermittently desats to 88-89%, cont to reassess O2 needs as we approach DC.  - leukocytosis noted, on admission, now resolved
- in setting of atelectasis as seen on CTA chest. likely sequelae of known prolonged sickle cell dz   - no evidence of acute chest syndrome  - incentive spirometry  - wean O2 as tolerated  - o2 intermittently desats to 88-89%, cont to reassess O2 needs as we approach DC.  - leukocytosis noted, on admission, now resolved
- in setting of atelectasis as seen on CTA chest. likely sequelae of known prolonged sickle cell dz   - no evidence of acute chest syndrome  - incentive spirometry  pt with intermittent desats, pulse ox moved to ear, 100%, cont to monitor  - leukocytosis likely reactive - now resolved.    now back on RA

## 2024-11-13 NOTE — PROGRESS NOTE ADULT - PROBLEM SELECTOR PLAN 7
VTE PPx: encourage ambulation, cr improving, unlikely bleeding; start hep SQ    Dispo- Improved. Being transitioned off IV lasix - f/u labs in AM. Anticipate DC in [id number].

## 2024-11-13 NOTE — PROGRESS NOTE ADULT - ASSESSMENT
44 year old male with history of sickle cell disease admitted with CLABSI  No Hypoxia or fevers    Sickle cell disease  -undergoing care with Dr Jordan of [facility]  -Transfuse for Hgb < 5.5  -chronic hemolysis, Iron overload  -pls limit transfusions, maintain Hgb 5.5  -Continue Jadenu  -follow up Dr Jordan after discharge      Bacteremia  -likely in setting of mediport as source  -Bcx from 10/25 with MRSA in anaerobic and aerobic bottles   -daptomycin completed  -ID following, if positive cx persist, port to be removed, for now ok to use port per ID      Meghana Real NP  Hematology/Oncology  [facility]  [phone] (Office)  [phone] (Alt office)  Evenings and weekends please call MD on call or office

## 2024-11-13 NOTE — PROGRESS NOTE ADULT - SUBJECTIVE AND OBJECTIVE BOX
[facility] Division of Kidney Diseases & Hypertension  FOLLOW UP NOTE  [phone]--------------------------------------------------------------------------------  Chief Complaint: MATA, Sickle cell crisis.    24 hour events/subjective: Pt was seen and examined earlier today. No acute overnight events. Pt reports feeling well. LE edema is improving with diuresis. Gout flare improved with prednisone.   Pt denies SOB/ Constipation/ Diarrhea/ Nausea/ Vomiting/ abdominal pain/ chest pain/ tingling/ numbness.     PAST HISTORY  --------------------------------------------------------------------------------  No significant changes to PMH, PSH, FHx, SHx, unless otherwise noted    ALLERGIES & MEDICATIONS  --------------------------------------------------------------------------------  Allergies    ceftriaxone (Anaphylaxis)  piperacillin-tazobactam (Urticaria)  penicillin (Pruritus)  hydroxyurea (Other)    Intolerances      Standing Inpatient Medications  allopurinol 50 milliGRAM(s) Oral daily  chlorhexidine 2% Cloths 1 Application(s) Topical <User Schedule>  Deferasirox (Jadenu) 360mg tablets 2 Tablet(s) 2 Tablet(s) Oral daily  folic acid 1 milliGRAM(s) Oral every 24 hours  furosemide   Injectable 20 milliGRAM(s) IV Push two times a day  heparin   Injectable 5000 Unit(s) SubCutaneous every 8 hours  hydrALAZINE 50 milliGRAM(s) Oral three times a day  HYDROmorphone PCA (1 mG/mL) 30 milliLiter(s) PCA Continuous PCA Continuous  pantoprazole    Tablet 40 milliGRAM(s) Oral before breakfast  polyethylene glycol 3350 17 Gram(s) Oral daily  predniSONE   Tablet 40 milliGRAM(s) Oral daily  senna 2 Tablet(s) Oral at bedtime  sodium chloride 0.45%. 1000 milliLiter(s) IV Continuous <Continuous>    PRN Inpatient Medications  acetaminophen     Tablet .. 650 milliGRAM(s) Oral every 6 hours PRN  diphenhydrAMINE 25 milliGRAM(s) Oral every 6 hours PRN  melatonin 3 milliGRAM(s) Oral at bedtime PRN  naloxone Injectable 0.1 milliGRAM(s) IV Push every 3 minutes PRN  ondansetron    Tablet 4 milliGRAM(s) Oral every 6 hours PRN  ondansetron Injectable 4 milliGRAM(s) IV Push every 6 hours PRN      REVIEW OF SYSTEMS  --------------------------------------------------------------------------------  As noted above.       All other systems were reviewed and are negative, except as noted.    VITALS/PHYSICAL EXAM  --------------------------------------------------------------------------------  T(C): 36.6 (11-13-24 @ 10:15), Max: 36.7 (11-12-24 @ 13:55)  HR: 88 (11-13-24 @ 10:15) (84 - 103)  BP: 159/82 (11-13-24 @ 10:15) (154/88 - 169/99)  RR: 16 (11-13-24 @ 10:15) (16 - 18)  SpO2: 98% (11-13-24 @ 10:15) (98% - 100%)  Wt(kg): --        Physical Exam:  Gen: NAD  HEENT: MMM  Pulm: Lower zone decreased breath sounds   CV: S1S2  Abd: Soft, +BS   Ext: Pitting LE edema B/L, L greater toe warmth and tenderness.   Neuro: Awake  Skin: Warm and dry  Vascular access: [facility]       LABS/STUDIES  --------------------------------------------------------------------------------              7.8    7.79  >-----------<  326      [11-13-24 @ 06:42]              22.3     138  |  98  |  42  ----------------------------<  99      [11-13-24 @ 06:42]  4.0   |  26  |  1.57        Ca     9.0     [11-13-24 @ 06:42]      Mg     1.80     [11-13-24 @ 06:42]      Phos  5.0     [11-13-24 @ 06:42]    TPro  7.3  /  Alb  3.7  /  TBili  6.3  /  DBili  x   /  AST  104  /  ALT  61  /  AlkPhos  131  [11-13-24 @ 06:42]              [11-13-24 @ 06:42]    Creatinine Trend:  SCr 1.57 [11-13 @ 06:42]  SCr 1.68 [11-12 @ 06:24]  SCr 1.84 [11-11 @ 05:30]  SCr 2.03 [11-09 @ 06:57]  SCr 2.10 [11-08 @ 06:45]

## 2024-11-14 ENCOUNTER — TRANSCRIPTION ENCOUNTER (OUTPATIENT)
Age: 44
End: 2024-11-14

## 2024-11-14 VITALS
OXYGEN SATURATION: 99 % | HEART RATE: 102 BPM | RESPIRATION RATE: 16 BRPM | DIASTOLIC BLOOD PRESSURE: 81 MMHG | TEMPERATURE: 98 F | SYSTOLIC BLOOD PRESSURE: 146 MMHG

## 2024-11-14 PROCEDURE — 99232 SBSQ HOSP IP/OBS MODERATE 35: CPT | Mod: GC

## 2024-11-14 PROCEDURE — 99239 HOSP IP/OBS DSCHRG MGMT >30: CPT

## 2024-11-14 RX ORDER — DEFERASIROX 500 MG/1
2 TABLET, FOR SUSPENSION ORAL
Qty: 60 | Refills: 0
Start: 2024-11-14 | End: 2024-12-13

## 2024-11-14 RX ORDER — FUROSEMIDE 40 MG
1 TABLET ORAL
Qty: 14 | Refills: 0
Start: 2024-11-14 | End: 2024-11-27

## 2024-11-14 RX ORDER — ALLOPURINOL 100 MG
0.5 TABLET ORAL
Qty: 15 | Refills: 0
Start: 2024-11-14 | End: 2024-12-13

## 2024-11-14 RX ORDER — HYDRALAZINE HYDROCHLORIDE 50 MG/1
1 TABLET, FILM COATED ORAL
Qty: 90 | Refills: 0
Start: 2024-11-14 | End: 2024-12-13

## 2024-11-14 RX ADMIN — FOLIC ACID 1 MILLIGRAM(S): 1 TABLET ORAL at 11:59

## 2024-11-14 RX ADMIN — HEPARIN SODIUM 5000 UNIT(S): 10000 INJECTION INTRAVENOUS; SUBCUTANEOUS at 05:37

## 2024-11-14 RX ADMIN — HYDRALAZINE HYDROCHLORIDE 50 MILLIGRAM(S): 50 TABLET, FILM COATED ORAL at 05:37

## 2024-11-14 RX ADMIN — Medication 40 MILLIGRAM(S): at 05:37

## 2024-11-14 RX ADMIN — Medication 30 MILLILITER(S): at 08:36

## 2024-11-14 RX ADMIN — HEPARIN SODIUM 5000 UNIT(S): 10000 INJECTION INTRAVENOUS; SUBCUTANEOUS at 15:48

## 2024-11-14 RX ADMIN — Medication 30 MILLILITER(S): at 11:53

## 2024-11-14 RX ADMIN — HYDRALAZINE HYDROCHLORIDE 50 MILLIGRAM(S): 50 TABLET, FILM COATED ORAL at 15:49

## 2024-11-14 RX ADMIN — PANTOPRAZOLE SODIUM 40 MILLIGRAM(S): 40 TABLET, DELAYED RELEASE ORAL at 05:37

## 2024-11-14 RX ADMIN — CHLORHEXIDINE GLUCONATE 1 APPLICATION(S): 40 SOLUTION TOPICAL at 05:37

## 2024-11-14 RX ADMIN — Medication 50 MILLIGRAM(S): at 11:59

## 2024-11-14 RX ADMIN — Medication 30 MILLILITER(S): at 11:54

## 2024-11-14 NOTE — PROGRESS NOTE ADULT - SUBJECTIVE AND OBJECTIVE BOX
[facility] Division of Kidney Diseases & Hypertension  FOLLOW UP NOTE  [phone]--------------------------------------------------------------------------------  Chief Complaint: MATA, sickle cell crisis.       24 hour events/subjective:Pt was seen and examined earlier today. No acute overnight events. No fresh complaints. Gout pain has improved. LE edema has improved.   Pt denies SOB/ Constipation/ Diarrhea/ Nausea/ Vomiting/ abdominal pain/ chest pain/ tingling/ numbness.       PAST HISTORY  --------------------------------------------------------------------------------  No significant changes to PMH, PSH, FHx, SHx, unless otherwise noted    ALLERGIES & MEDICATIONS  --------------------------------------------------------------------------------  Allergies    ceftriaxone (Anaphylaxis)  piperacillin-tazobactam (Urticaria)  penicillin (Pruritus)  hydroxyurea (Other)    Intolerances      Standing Inpatient Medications  allopurinol 50 milliGRAM(s) Oral daily  chlorhexidine 2% Cloths 1 Application(s) Topical <User Schedule>  Deferasirox (Jadenu) 360mg tablets 2 Tablet(s) 2 Tablet(s) Oral daily  folic acid 1 milliGRAM(s) Oral every 24 hours  furosemide    Tablet 40 milliGRAM(s) Oral daily  heparin   Injectable 5000 Unit(s) SubCutaneous every 8 hours  hydrALAZINE 50 milliGRAM(s) Oral three times a day  HYDROmorphone PCA (1 mG/mL) 30 milliLiter(s) PCA Continuous PCA Continuous  pantoprazole    Tablet 40 milliGRAM(s) Oral before breakfast  polyethylene glycol 3350 17 Gram(s) Oral daily  predniSONE   Tablet 40 milliGRAM(s) Oral daily  senna 2 Tablet(s) Oral at bedtime  sodium chloride 0.45%. 1000 milliLiter(s) IV Continuous <Continuous>    PRN Inpatient Medications  acetaminophen     Tablet .. 650 milliGRAM(s) Oral every 6 hours PRN  diphenhydrAMINE 25 milliGRAM(s) Oral every 6 hours PRN  melatonin 3 milliGRAM(s) Oral at bedtime PRN  naloxone Injectable 0.1 milliGRAM(s) IV Push every 3 minutes PRN  ondansetron    Tablet 4 milliGRAM(s) Oral every 6 hours PRN  ondansetron Injectable 4 milliGRAM(s) IV Push every 6 hours PRN      REVIEW OF SYSTEMS  --------------------------------------------------------------------------------  as noted above.     VITALS/PHYSICAL EXAM  --------------------------------------------------------------------------------  T(C): 36.7 (11-14-24 @ 10:30), Max: 36.7 (11-13-24 @ 17:00)  HR: 110 (11-14-24 @ 10:30) (92 - 110)  BP: 158/86 (11-14-24 @ 10:30) (139/73 - 173/84)  RR: 17 (11-14-24 @ 10:30) (17 - 19)  SpO2: 98% (11-14-24 @ 10:30) (97% - 99%)  Wt(kg): --    Physical Exam:  Gen: NAD  HEENT: MMM  Pulm: Lower zone decreased breath sounds   CV: S1S2  Abd: Soft, +BS   Ext: Pitting LE edema B/L, L greater toe warmth and tenderness.   Neuro: Awake  Skin: Warm and dry  Vascular access: [facility]       LABS/STUDIES  --------------------------------------------------------------------------------              7.8    7.79  >-----------<  326      [11-13-24 @ 06:42]              22.3     138  |  98  |  42  ----------------------------<  99      [11-13-24 @ 06:42]  4.0   |  26  |  1.57        Ca     9.0     [11-13-24 @ 06:42]      Mg     1.80     [11-13-24 @ 06:42]      Phos  5.0     [11-13-24 @ 06:42]    TPro  7.3  /  Alb  3.7  /  TBili  6.3  /  DBili  x   /  AST  104  /  ALT  61  /  AlkPhos  131  [11-13-24 @ 06:42]              [11-13-24 @ 06:42]    Creatinine Trend:  SCr 1.57 [11-13 @ 06:42]  SCr 1.68 [11-12 @ 06:24]  SCr 1.84 [11-11 @ 05:30]  SCr 2.03 [11-09 @ 06:57]  SCr 2.10 [11-08 @ 06:45]

## 2024-11-14 NOTE — DISCHARGE NOTE NURSING/CASE MANAGEMENT/SOCIAL WORK - NSDCFUADDAPPT_GEN_ALL_CORE_FT
Please follow up with your primary care provider in 1-2 weeks following discharge from the hospital for continued monitoring and management.     Please follow up with the nephrology office within the next two weeks. Information above

## 2024-11-14 NOTE — DISCHARGE NOTE NURSING/CASE MANAGEMENT/SOCIAL WORK - FINANCIAL ASSISTANCE
[facility] provides services at a reduced cost to those who are determined to be eligible through [facility]’s financial assistance program. Information regarding [facility]’s financial assistance program can be found by going to [email or web address] or by calling [phone].

## 2024-11-14 NOTE — PROGRESS NOTE ADULT - REASON FOR ADMISSION
sickle cell crisis

## 2024-11-14 NOTE — PROGRESS NOTE ADULT - PROVIDER SPECIALTY LIST ADULT
Heme/Onc
Infectious Disease
Nephrology
Hepatology
Infectious Disease
Infectious Disease
Nephrology
Nephrology
Heme/Onc
Hospitalist
Hospitalist
Infectious Disease
Infectious Disease
Nephrology
Heme/Onc
Hospitalist
Nephrology
Nephrology
Hospitalist
Nephrology
Hospitalist
Nephrology
Hospitalist

## 2024-11-14 NOTE — PROGRESS NOTE ADULT - NS ATTEND AMEND GEN_ALL_CORE FT
Agree with above assessment and plan.   Hemoglobin 7.8 today. On room air. Clinically appears well. d/c planning. Follow up with Dr Jordan at [facility] after discharge

## 2024-11-14 NOTE — PROGRESS NOTE ADULT - SUBJECTIVE AND OBJECTIVE BOX
patient seen today, discharge plan      MEDICATIONS  (STANDING):  allopurinol 50 milliGRAM(s) Oral daily  chlorhexidine 2% Cloths 1 Application(s) Topical <User Schedule>  Deferasirox (Jadenu) 360mg tablets 2 Tablet(s) 2 Tablet(s) Oral daily  folic acid 1 milliGRAM(s) Oral every 24 hours  furosemide    Tablet 40 milliGRAM(s) Oral daily  heparin   Injectable 5000 Unit(s) SubCutaneous every 8 hours  hydrALAZINE 50 milliGRAM(s) Oral three times a day  HYDROmorphone PCA (1 mG/mL) 30 milliLiter(s) PCA Continuous PCA Continuous  pantoprazole    Tablet 40 milliGRAM(s) Oral before breakfast  polyethylene glycol 3350 17 Gram(s) Oral daily  predniSONE   Tablet 40 milliGRAM(s) Oral daily  senna 2 Tablet(s) Oral at bedtime  sodium chloride 0.45%. 1000 milliLiter(s) (30 mL/Hr) IV Continuous <Continuous>    MEDICATIONS  (PRN):  acetaminophen     Tablet .. 650 milliGRAM(s) Oral every 6 hours PRN Temp greater or equal to 38C (100.4F), Mild Pain (1 - 3)  diphenhydrAMINE 25 milliGRAM(s) Oral every 6 hours PRN Rash and/or Itching  melatonin 3 milliGRAM(s) Oral at bedtime PRN Insomnia  naloxone Injectable 0.1 milliGRAM(s) IV Push every 3 minutes PRN For ANY of the following changes in patient status:  A. RR LESS THAN 10 breaths per minute, B. Oxygen saturation LESS THAN 90%, C. Sedation score of 6  ondansetron    Tablet 4 milliGRAM(s) Oral every 6 hours PRN Nausea and/or Vomiting  ondansetron Injectable 4 milliGRAM(s) IV Push every 6 hours PRN Nausea      Vital Signs Last 24 Hrs  T(C): 36.7 (14 Nov 2024 10:30), Max: 36.7 (13 Nov 2024 17:00)  T(F): 98.1 (14 Nov 2024 10:30), Max: 98.1 (14 Nov 2024 10:30)  HR: 110 (14 Nov 2024 10:30) (92 - 110)  BP: 158/86 (14 Nov 2024 10:30) (139/73 - 173/84)  BP(mean): --  RR: 17 (14 Nov 2024 10:30) (17 - 19)  SpO2: 98% (14 Nov 2024 10:30) (97% - 99%)    Parameters below as of 14 Nov 2024 05:14  Patient On (Oxygen Delivery Method): room air        PE  NAD  Awake, alert  Anicteric, MMM  RRR  CTAB  Abd soft, NT, ND  No c/c/e  No rash grossly                            7.8    7.79  )-----------( 326      ( 13 Nov 2024 06:42 )             22.3       11-13    138  |  98  |  42[H]  ----------------------------<  99  4.0   |  26  |  1.57[H]    Ca    9.0      13 Nov 2024 06:42  Phos  5.0     11-13  Mg     1.80     11-13    TPro  7.3  /  Alb  3.7  /  TBili  6.3[H]  /  DBili  x   /  AST  104[H]  /  ALT  61[H]  /  AlkPhos  131[H]  11-13

## 2024-11-14 NOTE — PROGRESS NOTE ADULT - ATTENDING COMMENTS
ATN   V/ol improving and less tense edema   continue with lasix  needs accurate I/Os
MATA in the setting of sickle cell anemia, Pain, infection, NSAIDs use ( contrast use and high Vanco level earlier)   proteinuria   metabolic acidosis   avoid contrast studies, ACE-I, ARB, or NSAIDs  continue IVF today with bicarbonate  ? removal of port   avoid contrast studies, ACE-I, ARB, or NSAIDs   dose meds per eGFR
MATA in the setting of sickle cell anemia, Pain, infection, NSAIDs use ( contrast use and high Vanco level earlier)   proteinuria needs to be addressed as OP   metabolic acidosis s/p Bicarbonate gtt. now with worsening LE edema     DC IVF   agree with pred for gout   IV antibiotics. Id following   avoid contrast studies, ACE-I, ARB, or NSAIDs   dose meds per eGFR  discussed with med attending
No history of overt hepatic decompensations (i.e., no history of ascites, hepatic encephalopathy, or variceal hemorrhage) and no definite radiologic evidence of portal hypertension. We will arrange for outpatient hepatology follow-up. Remainder of care while inpatient as per primary team and would consider Hematology consultation.    We will sign off. Please don't hesitate to call with any questions or concerns.    Kashmir Mendiola M.D., Ph.D.  Transplant Hepatology
improving MATA  edema significant but steady improvement  change to Lasix 40mg BID orally tomorrow   discussed  with attending
ATN   v/ol with tense edema. IVF stopped yesterday  a trial of diuretics today  discussed with medicine attending
Patient examined and ROS reviewed. A case of MATA with proteinuria in the setting sickle cells crisis and sepsis. Advised to continue antibiotics and fluid balance.
needs strict I/os   cr trends better  still with edema. on diuretics   ok to give Pred for Gout  discussed with Kelsie Pereira   avoid contrast studies, ACE-I, ARB, or NSAIDs   dose meds per eGFR
resolving MATA  ok to DC on lasix 40mg daily for 2 weeks. then, reassess need  Renal service will sign off at this time. please re-consult if needed
still with tense edema. MATA improving   increase Lasix to 40 mg BID   strict I/os and daily weights

## 2024-11-14 NOTE — DISCHARGE NOTE NURSING/CASE MANAGEMENT/SOCIAL WORK - NSDCPEFALRISK_GEN_ALL_CORE
For information on Fall & Injury Prevention, visit: [email or web address] OR  [email or web address] OR  https://www.cdc.gov/steadi/patient.html

## 2024-11-14 NOTE — DISCHARGE NOTE NURSING/CASE MANAGEMENT/SOCIAL WORK - PATIENT PORTAL LINK FT
You can access the FollowMyHealth Patient Portal offered by [facility] by registering at the following website: [email or web address]. By joining [facility]s FollowMyHealth portal, you will also be able to view your health information using other applications (apps) compatible with our system.

## 2024-11-14 NOTE — PROGRESS NOTE ADULT - ASSESSMENT
44 year old male with history of sickle cell disease admitted with CLABSI  No Hypoxia or fevers    Sickle cell disease  -undergoing care with Dr Jordan of [facility]  -Transfuse for Hgb < 5.5  -chronic hemolysis, Iron overload  -pls limit transfusions, maintain Hgb 5.5  -Continue Jadenu  -follow up Dr Jordan after discharge      Bacteremia  -likely in setting of mediport as source  -Bcx from 10/25 with MRSA in anaerobic and aerobic bottles   -daptomycin completed  -ID following, if positive cx persist, port to be removed, for now ok to use port per ID    discharge plan      Meghana Real NP  Hematology/Oncology  [facility]  [phone] (Office)  [phone] (Alt office)  Evenings and weekends please call MD on call or office

## 2024-11-14 NOTE — PROGRESS NOTE ADULT - PROBLEM SELECTOR PLAN 1
At the time of admission pt had Scr of 1.55. His baseline is ~0.8. MATA initially improved to his baseline Scr with blood transfusions and IV fluids. Pt got CT with IV contrast on 10/25/24. But Scr remained around his baseline till 10/31. Pt has medi port for recurrent transfusions. He was later found to have MRSA bacteremia. On IV antibiotics. Acute chest syndrome was ruled out. Pt was given Toradol on 10/30 and Scr started to rise 1.36 on 10/31 with peak SCr of 2.24 on 11/5. SCr remains elevated but improved to 1.74  (11/11) --> 1.68 11/12--> 1.54 11/13--> No labs today. USG: Right kidney: 12.4 cm. Left kidney: 11.3 cm. Repeat UPCR (11/4) - UPCR 3.2. Large blood noted. MATA in setting of infection related ATN, sickle cell disease and NSAID use.  LDH is still high. Hapto low. Mg is 1.8. (11/13)  PLAN:  Off IV fluids now. Only KVO.    On PO [id number]  qd  now. C/w same for now.   Please Monitor I/Os and labs.   Avoid nephrotoxins/ NSIADs. Dose medications as per eGFR.  Management of Gout as per primary team. Pt is on pred now. Pt feels better now.

## 2024-11-14 NOTE — PROGRESS NOTE ADULT - PROBLEM SELECTOR PROBLEM 1
MATA (acute kidney injury)
MRSA bacteremia
MATA (acute kidney injury)
Sickle cell pain crisis
MATA (acute kidney injury)
MRSA bacteremia
MRSA bacteremia
MATA (acute kidney injury)
MATA (acute kidney injury)
MRSA bacteremia
MATA (acute kidney injury)
MRSA bacteremia
Sickle cell pain crisis
MRSA bacteremia

## 2024-12-08 ENCOUNTER — INPATIENT (INPATIENT)
Facility: HOSPITAL | Age: 44
LOS: 25 days | Discharge: HOME | End: 2025-01-03
Attending: INTERNAL MEDICINE | Admitting: INTERNAL MEDICINE
Payer: MEDICAID

## 2024-12-08 VITALS
RESPIRATION RATE: 20 BRPM | WEIGHT: 192.02 LBS | HEART RATE: 123 BPM | SYSTOLIC BLOOD PRESSURE: 135 MMHG | OXYGEN SATURATION: 94 % | HEIGHT: 69 IN | TEMPERATURE: 98 F | DIASTOLIC BLOOD PRESSURE: 85 MMHG

## 2024-12-08 DIAGNOSIS — Z90.49 ACQUIRED ABSENCE OF OTHER SPECIFIED PARTS OF DIGESTIVE TRACT: Chronic | ICD-10-CM

## 2024-12-08 LAB
ANISOCYTOSIS BLD QL: SIGNIFICANT CHANGE UP
APTT BLD: 32.7 SEC — SIGNIFICANT CHANGE UP (ref 24.5–35.6)
BASOPHILS # BLD AUTO: 0.81 K/UL — HIGH (ref 0–0.2)
BASOPHILS NFR BLD AUTO: 5.7 % — HIGH (ref 0–2)
BLOOD GAS VENOUS COMPREHENSIVE RESULT: SIGNIFICANT CHANGE UP
BURR CELLS BLD QL SMEAR: PRESENT — SIGNIFICANT CHANGE UP
DACRYOCYTES BLD QL SMEAR: SLIGHT — SIGNIFICANT CHANGE UP
ELLIPTOCYTES BLD QL SMEAR: SLIGHT — SIGNIFICANT CHANGE UP
EOSINOPHIL # BLD AUTO: 0 K/UL — SIGNIFICANT CHANGE UP (ref 0–0.5)
EOSINOPHIL NFR BLD AUTO: 0 % — SIGNIFICANT CHANGE UP (ref 0–6)
GIANT PLATELETS BLD QL SMEAR: PRESENT — SIGNIFICANT CHANGE UP
HCT VFR BLD CALC: 16.5 % — CRITICAL LOW (ref 39–50)
HGB BLD-MCNC: 6 G/DL — CRITICAL LOW (ref 13–17)
HYPOCHROMIA BLD QL: SLIGHT — SIGNIFICANT CHANGE UP
IANC: 6.8 K/UL — SIGNIFICANT CHANGE UP (ref 1.8–7.4)
INR BLD: 1.12 RATIO — SIGNIFICANT CHANGE UP (ref 0.85–1.16)
LYMPHOCYTES # BLD AUTO: 30.5 % — SIGNIFICANT CHANGE UP (ref 13–44)
LYMPHOCYTES # BLD AUTO: 4.35 K/UL — HIGH (ref 1–3.3)
MACROCYTES BLD QL: SLIGHT — SIGNIFICANT CHANGE UP
MANUAL SMEAR VERIFICATION: SIGNIFICANT CHANGE UP
MCHC RBC-ENTMCNC: 29.9 PG — SIGNIFICANT CHANGE UP (ref 27–34)
MCHC RBC-ENTMCNC: 36.4 G/DL — HIGH (ref 32–36)
MCV RBC AUTO: 82.1 FL — SIGNIFICANT CHANGE UP (ref 80–100)
MICROCYTES BLD QL: SLIGHT — SIGNIFICANT CHANGE UP
MONOCYTES # BLD AUTO: 1.23 K/UL — HIGH (ref 0–0.9)
MONOCYTES NFR BLD AUTO: 8.6 % — SIGNIFICANT CHANGE UP (ref 2–14)
NEUTROPHILS # BLD AUTO: 7.75 K/UL — HIGH (ref 1.8–7.4)
NEUTROPHILS NFR BLD AUTO: 54.3 % — SIGNIFICANT CHANGE UP (ref 43–77)
NRBC # BLD: 2 /100 WBCS — HIGH (ref 0–0)
OVALOCYTES BLD QL SMEAR: SIGNIFICANT CHANGE UP
PLAT MORPH BLD: ABNORMAL
PLATELET # BLD AUTO: 334 K/UL — SIGNIFICANT CHANGE UP (ref 150–400)
PLATELET COUNT - ESTIMATE: NORMAL — SIGNIFICANT CHANGE UP
POIKILOCYTOSIS BLD QL AUTO: SIGNIFICANT CHANGE UP
POLYCHROMASIA BLD QL SMEAR: SIGNIFICANT CHANGE UP
PROTHROM AB SERPL-ACNC: 13.3 SEC — SIGNIFICANT CHANGE UP (ref 9.9–13.4)
RBC # BLD: 2.01 M/UL — LOW (ref 4.2–5.8)
RBC # BLD: 2.01 M/UL — LOW (ref 4.2–5.8)
RBC # FLD: 23.6 % — HIGH (ref 10.3–14.5)
RBC BLD AUTO: ABNORMAL
RETICS #: 424.1 K/UL — HIGH (ref 25–125)
RETICS/RBC NFR: 21.1 % — HIGH (ref 0.5–2.5)
SCHISTOCYTES BLD QL AUTO: SLIGHT — SIGNIFICANT CHANGE UP
SICKLE CELLS BLD QL SMEAR: SIGNIFICANT CHANGE UP
SMUDGE CELLS # BLD: PRESENT — SIGNIFICANT CHANGE UP
TARGETS BLD QL SMEAR: SIGNIFICANT CHANGE UP
VARIANT LYMPHS # BLD: 0.9 % — SIGNIFICANT CHANGE UP (ref 0–6)
WBC # BLD: 14.27 K/UL — HIGH (ref 3.8–10.5)
WBC # FLD AUTO: 14.27 K/UL — HIGH (ref 3.8–10.5)

## 2024-12-08 PROCEDURE — 99285 EMERGENCY DEPT VISIT HI MDM: CPT

## 2024-12-08 PROCEDURE — 71046 X-RAY EXAM CHEST 2 VIEWS: CPT | Mod: 26

## 2024-12-08 RX ORDER — HYDROMORPHONE HCL 4 MG
1 TABLET ORAL ONCE
Refills: 0 | Status: DISCONTINUED | OUTPATIENT
Start: 2024-12-08 | End: 2024-12-08

## 2024-12-08 RX ORDER — SODIUM CHLORIDE 9 MG/ML
1000 INJECTION, SOLUTION INTRAMUSCULAR; INTRAVENOUS; SUBCUTANEOUS ONCE
Refills: 0 | Status: COMPLETED | OUTPATIENT
Start: 2024-12-08 | End: 2024-12-08

## 2024-12-08 RX ORDER — HYDROMORPHONE HCL 4 MG
0.5 TABLET ORAL ONCE
Refills: 0 | Status: DISCONTINUED | OUTPATIENT
Start: 2024-12-08 | End: 2024-12-08

## 2024-12-08 RX ADMIN — Medication 1 MILLIGRAM(S): at 22:42

## 2024-12-08 RX ADMIN — SODIUM CHLORIDE 1000 MILLILITER(S): 9 INJECTION, SOLUTION INTRAMUSCULAR; INTRAVENOUS; SUBCUTANEOUS at 22:42

## 2024-12-08 RX ADMIN — Medication 1 MILLIGRAM(S): at 23:57

## 2024-12-08 NOTE — ED ADULT TRIAGE NOTE - HEART RATE (BEATS/MIN)
Physical Therapy  Cash Based Dry Needling Session    Visit: 33  Dry Needling Informed Consent Signed: Yes  Patient has been made aware of the cash based cost associated with each of the above procedures: Yes    SUBJECTIVE     Reports a little more pain at night.  Going mattress shopping.  Looking for a more firm mattress.      Pain 1-2/10 standing currently.     OBJECTIVE     hypertonicity through bilateral lumbar paraspinals    Treatment   Education about indications, contraindications and potential side effects completed with patient.  Screen Completed   Precautions Contraindications   Local skin lesions  Lyme disease  Local lymphedema  Severe hyperalgesia/allodynia  Metal allergies: nickel and chromium  Abnormal bleeding tendency  Immunodeficiency and/or compromised immune system  Second or third trimester of pregnancy  Vascular Disease  History of spontaneous pneumothorax Local or systemic infections; including the flu  Over implants  Active cancer  Area of lymphatic compromise  Area of lumpectomy/mastectomy  First trimester of pregnancy     Patient has consented to proceed with the intervention.    Dry Needling:   • Location: left lumbar paraspinals Needle size: .30 x 50 mm Quantity: 1  • Location: bilateral L3/4, L4/5 multifidi with estim Needle size: .30 x 50 mm Quantity: 4 total    Applied arnica gel to bilateral lumbar paraspinals post treatment at request of patient     Total Needles Inserted: 5 Removed: 5  Duration: 40  Results: no change in symptoms immediately following modality; no adverse reaction to treatment    ASSESSMENT AND FUTURE RECOMMENDATIONS    Response to treatment: decreased trigger points bilaterally,   Re-assessment of dysfunctions following intervention demonstrates: twitch response elicited     Based on the above recommendation is to: Continue Cash Based Service    CASH BASED THERAPY DAILY BILLING   Untimed Procedures:  Dry Needling - Unlisted Physical Medicine/Rehabilitation Service Or  Procedure  Total time spent with patient: 40 minutes     123

## 2024-12-08 NOTE — ED PROVIDER NOTE - OBJECTIVE STATEMENT
44-year-old male with past medical history of sickle cell, prior CTAs for acute chest presents to ED complaining of left-sided rib and back pain status post fall.  Patient had ground-level fall 2 days ago tripped on slippers outside fell onto concrete onto the left ribs, now with persistent left rib pain rating to the back with associated shortness of breath.  Patient states he has been short of breath for the last 3 days, worsening over the last 2 days after the fall.  Persistently short of breath prompting visit to ED.  Patient takes folic acid and Romeo 30 mg for sickle cell pain.  Denies prodromal symptoms prior to fall.  Denies fevers chills nausea vomiting diarrhea chest pain abdominal pain dysuria hematuria.

## 2024-12-08 NOTE — ED PROVIDER NOTE - ATTENDING CONTRIBUTION TO CARE
43 yo M with h/o sickle cell, hemochromatosis who presents to the ED c/o L lower rib pain for the past few days. He reports that a few days ago he tripped while walking and fell, landing on his L ribs. Reports that he landed on cement. The day after started having rib pain which has been getting progressively worse, also associated with shortness of breath. Pain is worse with a deep breath. Has been taking his 30 mg home oxy without much relief. In the ED, pt hypoxic, improved on nasal cannula. On exam, he has severe TTP over L lower anterior and lateral ribs with no overlying skin changes, lungs CTAB. Differential includes rib fracture, pneumothorax, PE. Less likely acute chest. Plan for labs, CTA chest, pain control.

## 2024-12-08 NOTE — ED PROVIDER NOTE - PHYSICAL EXAMINATION
Gen: AAOx3, non-toxic  Head: NCAT  HEENT: EOMI, oral mucosa moist, normal conjunctiva  Lung: CTAB, no respiratory distress, no wheezes/rhonchi/rales B/L,  L sided rib pain from anterior mid-thoracic region, no overlying skin changes,   CV: RRR, no murmurs, rubs or gallops  Abd: soft, NTND, no guarding, no CVA tenderness  MSK: no visible deformities  Neuro: No focal sensory or motor deficits  Skin: Warm, well perfused, no rash  Psych: normal affect.

## 2024-12-08 NOTE — ED ADULT NURSE NOTE - NS ED NOTE ABUSE RESPONSE YN
Interval History: No acute events overnight. Pursuing placement options. No new concerns.    Review of Systems   Constitutional:  Negative for chills and fever.   Respiratory:  Negative for cough and shortness of breath.    Cardiovascular:  Negative for chest pain and palpitations.   Gastrointestinal:  Negative for abdominal pain, nausea and vomiting.   Objective:     Vital Signs (Most Recent):  Temp: 97.8 °F (36.6 °C) (06/23/23 1500)  Pulse: 79 (06/23/23 1500)  Resp: 16 (06/23/23 1500)  BP: 119/89 (06/23/23 1500)  SpO2: (!) 92 % (06/23/23 1500) Vital Signs (24h Range):  Temp:  [97.3 °F (36.3 °C)-97.8 °F (36.6 °C)] 97.8 °F (36.6 °C)  Pulse:  [79-86] 79  Resp:  [14-18] 16  SpO2:  [92 %-100 %] 92 %  BP: (116-176)/(68-89) 119/89     Weight: 32.5 kg (71 lb 10.4 oz)  Body mass index is 10.58 kg/m².    Intake/Output Summary (Last 24 hours) at 6/23/2023 1822  Last data filed at 6/23/2023 1710  Gross per 24 hour   Intake 840 ml   Output 525 ml   Net 315 ml           Physical Exam  Vitals and nursing note reviewed.   Constitutional:       General: He is not in acute distress.     Appearance: He is well-developed. He is cachectic. He is ill-appearing.   HENT:      Head: Normocephalic and atraumatic.   Eyes:      General:         Right eye: No discharge.         Left eye: No discharge.      Conjunctiva/sclera: Conjunctivae normal.   Cardiovascular:      Rate and Rhythm: Normal rate.      Pulses: Normal pulses.   Pulmonary:      Effort: Pulmonary effort is normal. No respiratory distress.   Abdominal:      Palpations: Abdomen is soft.      Tenderness: There is no abdominal tenderness.   Musculoskeletal:         General: Normal range of motion.      Right lower leg: No edema.      Left lower leg: No edema.   Skin:     General: Skin is warm and dry.   Neurological:      Mental Status: He is alert and oriented to person, place, and time.         Significant Labs:   No new labs this morning.    Significant Imaging:   No new imaging  this morning.   Yes

## 2024-12-08 NOTE — ED ADULT TRIAGE NOTE - SOURCE OF INFORMATION
[Dear  ___] : Dear  [unfilled], [Consult Letter:] : I had the pleasure of evaluating your patient, [unfilled]. [DrSaeed  ___] : Dr. HAIRSTON [FreeTextEntry2] : Oral Ziegler MD\par [address] [place] [address]\[place] [address] Patient

## 2024-12-08 NOTE — ED PROVIDER NOTE - CLINICAL SUMMARY MEDICAL DECISION MAKING FREE TEXT BOX
44-year-old male with past medical history of sickle cell, prior CTAs for acute chest presents to ED complaining of left-sided rib and back pain status post fall.  Patient had ground-level fall 2 days ago tripped on slippers outside fell onto concrete onto the left ribs, now with persistent left rib pain rating to the back with associated shortness of breath.  Patient states he has been short of breath for the last 3 days, worsening over the last 2 days after the fall.  Persistently short of breath prompting visit to ED.  Patient takes folic acid and Romeo 30 mg for sickle cell pain.  Denies prodromal symptoms prior to fall.  Denies fevers chills nausea vomiting diarrhea chest pain abdominal pain dysuria hematuria.    VS. Clinically stable. EKG wnl w no ST elevations or T wave inversions. PE, well appearing, no acute distress, AAOx3. NCAT, EOMI, normal conjunctiva, mucous membranes moist, LCTAB no w/r/c, L sided rib pain from anterior mid-thoracic region, no overlying skin changes, no MRG, RRR, abd NDNT, no rebound tenderness or guarding, no CVA ttp, no focal neuro deficits, neurovascularly intact, no bruising, rashes, or erythema, 2+ pitting edema. Suspicion for rib fx vs pe vs sickle cell pain crisis vs low suspicion for acute chest but will assess w labs, CTA, pain meds, hydration. dispo pending Matias,

## 2024-12-08 NOTE — ED ADULT TRIAGE NOTE - CHIEF COMPLAINT QUOTE
c/o SOB x3 days. Pt had mechanical fall 2 days ago and endorses left sided rib and back pain. Denies chest pain, headache, n/v. History of sickle cell disease, gout. c/o SOB x3 days. Pt had mechanical fall 2 days ago and endorses left sided rib and back pain. Denies chest pain, headache, n/v. pt appears jaundice. History of sickle cell disease, gout. c/o SOB x3 days. Pt had mechanical fall 2 days ago and endorses left sided rib and back pain. Denies chest pain, headache, n/v. pt appears jaundice. History of sickle cell disease, gout. Hypoxic to 80s on room air, placed on O2 via NC.

## 2024-12-08 NOTE — ED ADULT NURSE NOTE - OBJECTIVE STATEMENT
hx sickle cell anemia. pt appears jaundice and sob, + labored breathing. maintained on 6L NC, tolerating well at 96% o2 sat. connected to cardiac monitor, sinus tachycardia noted. pt reports mechanical fall 2 days ago, + left rib and back pain since fall. pt sitting up in stretcher, A&OX4, able to speak in clear complete sentences, RICHARDSON equal bilaterally. safety measures maintained, MD at bedside, pt able to answer questions appropriately

## 2024-12-08 NOTE — ED PROVIDER NOTE - CARE PLAN
Principal Discharge DX:	Suspected fracture of rib   1 Principal Discharge DX:	Sickle cell pain crisis

## 2024-12-08 NOTE — ED ADULT NURSE NOTE - CHIEF COMPLAINT QUOTE
c/o SOB x3 days. Pt had mechanical fall 2 days ago and endorses left sided rib and back pain. Denies chest pain, headache, n/v. pt appears jaundice. History of sickle cell disease, gout. Hypoxic to 80s on room air, placed on O2 via NC.

## 2024-12-09 DIAGNOSIS — E80.6 OTHER DISORDERS OF BILIRUBIN METABOLISM: ICD-10-CM

## 2024-12-09 DIAGNOSIS — I10 ESSENTIAL (PRIMARY) HYPERTENSION: ICD-10-CM

## 2024-12-09 DIAGNOSIS — R60.0 LOCALIZED EDEMA: ICD-10-CM

## 2024-12-09 DIAGNOSIS — N17.9 ACUTE KIDNEY FAILURE, UNSPECIFIED: ICD-10-CM

## 2024-12-09 DIAGNOSIS — J96.01 ACUTE RESPIRATORY FAILURE WITH HYPOXIA: ICD-10-CM

## 2024-12-09 DIAGNOSIS — D57.00 HB-SS DISEASE WITH CRISIS, UNSPECIFIED: ICD-10-CM

## 2024-12-09 DIAGNOSIS — D57.1 SICKLE-CELL DISEASE WITHOUT CRISIS: ICD-10-CM

## 2024-12-09 DIAGNOSIS — Z29.9 ENCOUNTER FOR PROPHYLACTIC MEASURES, UNSPECIFIED: ICD-10-CM

## 2024-12-09 LAB
ALBUMIN SERPL ELPH-MCNC: 3.9 G/DL — SIGNIFICANT CHANGE UP (ref 3.3–5)
ALP SERPL-CCNC: 193 U/L — HIGH (ref 40–120)
ALT FLD-CCNC: 63 U/L — HIGH (ref 4–41)
ANION GAP SERPL CALC-SCNC: 15 MMOL/L — HIGH (ref 7–14)
AST SERPL-CCNC: 141 U/L — HIGH (ref 4–40)
BILIRUB SERPL-MCNC: 8.4 MG/DL — HIGH (ref 0.2–1.2)
BLD GP AB SCN SERPL QL: NEGATIVE — SIGNIFICANT CHANGE UP
BUN SERPL-MCNC: 28 MG/DL — HIGH (ref 7–23)
CALCIUM SERPL-MCNC: 9.2 MG/DL — SIGNIFICANT CHANGE UP (ref 8.4–10.5)
CHLORIDE SERPL-SCNC: 98 MMOL/L — SIGNIFICANT CHANGE UP (ref 98–107)
CO2 SERPL-SCNC: 18 MMOL/L — LOW (ref 22–31)
CREAT SERPL-MCNC: 1.89 MG/DL — HIGH (ref 0.5–1.3)
EGFR: 44 ML/MIN/1.73M2 — LOW
GLUCOSE SERPL-MCNC: 108 MG/DL — HIGH (ref 70–99)
HAPTOGLOB SERPL-MCNC: <20 MG/DL — LOW (ref 34–200)
LDH SERPL L TO P-CCNC: 1443 U/L — HIGH (ref 135–225)
LIDOCAIN IGE QN: 61 U/L — HIGH (ref 7–60)
MAGNESIUM SERPL-MCNC: 1.7 MG/DL — SIGNIFICANT CHANGE UP (ref 1.6–2.6)
NT-PROBNP SERPL-SCNC: 4665 PG/ML — HIGH
POTASSIUM SERPL-MCNC: 5 MMOL/L — SIGNIFICANT CHANGE UP (ref 3.5–5.3)
POTASSIUM SERPL-SCNC: 5 MMOL/L — SIGNIFICANT CHANGE UP (ref 3.5–5.3)
PROT SERPL-MCNC: 7.4 G/DL — SIGNIFICANT CHANGE UP (ref 6–8.3)
RH IG SCN BLD-IMP: POSITIVE — SIGNIFICANT CHANGE UP
SODIUM SERPL-SCNC: 131 MMOL/L — LOW (ref 135–145)
TROPONIN T, HIGH SENSITIVITY RESULT: 31 NG/L — SIGNIFICANT CHANGE UP
TROPONIN T, HIGH SENSITIVITY RESULT: 38 NG/L — SIGNIFICANT CHANGE UP

## 2024-12-09 PROCEDURE — 99223 1ST HOSP IP/OBS HIGH 75: CPT

## 2024-12-09 PROCEDURE — 71275 CT ANGIOGRAPHY CHEST: CPT | Mod: 26,MC

## 2024-12-09 RX ORDER — METOPROLOL TARTRATE 50 MG
25 TABLET ORAL
Refills: 0 | Status: DISCONTINUED | OUTPATIENT
Start: 2024-12-09 | End: 2024-12-17

## 2024-12-09 RX ORDER — POLYETHYLENE GLYCOL 3350 17 G/DOSE
17 POWDER (GRAM) ORAL DAILY
Refills: 0 | Status: DISCONTINUED | OUTPATIENT
Start: 2024-12-09 | End: 2024-12-16

## 2024-12-09 RX ORDER — SODIUM CHLORIDE 9 MG/ML
1000 INJECTION, SOLUTION INTRAVENOUS
Refills: 0 | Status: DISCONTINUED | OUTPATIENT
Start: 2024-12-09 | End: 2024-12-10

## 2024-12-09 RX ORDER — INFLUENZA A VIRUS A/WISCONSIN/588/2019 (H1N1) RECOMBINANT HEMAGGLUTININ ANTIGEN, INFLUENZA A VIRUS A/DARWIN/6/2021 (H3N2) RECOMBINANT HEMAGGLUTININ ANTIGEN, INFLUENZA B VIRUS B/AUSTRIA/1359417/2021 RECOMBINANT HEMAGGLUTININ ANTIGEN, AND INFLUENZA B VIRUS B/PHUKET/3073/2013 RECOMBINANT HEMAGGLUTININ ANTIGEN 45; 45; 45; 45 UG/.5ML; UG/.5ML; UG/.5ML; UG/.5ML
0.5 INJECTION INTRAMUSCULAR ONCE
Refills: 0 | Status: DISCONTINUED | OUTPATIENT
Start: 2024-12-09 | End: 2025-01-03

## 2024-12-09 RX ORDER — SENNOSIDES 8.6 MG/1
2 TABLET, FILM COATED ORAL AT BEDTIME
Refills: 0 | Status: DISCONTINUED | OUTPATIENT
Start: 2024-12-09 | End: 2024-12-16

## 2024-12-09 RX ORDER — ALLOPURINOL 100 MG/1
50 TABLET ORAL DAILY
Refills: 0 | Status: DISCONTINUED | OUTPATIENT
Start: 2024-12-09 | End: 2025-01-03

## 2024-12-09 RX ORDER — FUROSEMIDE 20 MG
40 TABLET ORAL DAILY
Refills: 0 | Status: DISCONTINUED | OUTPATIENT
Start: 2024-12-09 | End: 2024-12-18

## 2024-12-09 RX ORDER — VITAMIN A 10000 UNIT
1 TABLET ORAL EVERY 24 HOURS
Refills: 0 | Status: DISCONTINUED | OUTPATIENT
Start: 2024-12-09 | End: 2025-01-03

## 2024-12-09 RX ORDER — FUROSEMIDE 20 MG
40 TABLET ORAL ONCE
Refills: 0 | Status: COMPLETED | OUTPATIENT
Start: 2024-12-09 | End: 2024-12-09

## 2024-12-09 RX ORDER — NALOXONE HCL 0.4 MG/ML
0.1 VIAL (ML) INJECTION ONCE
Refills: 0 | Status: DISCONTINUED | OUTPATIENT
Start: 2024-12-09 | End: 2025-01-03

## 2024-12-09 RX ORDER — SODIUM CHLORIDE 9 MG/ML
1000 INJECTION, SOLUTION INTRAVENOUS
Refills: 0 | Status: DISCONTINUED | OUTPATIENT
Start: 2024-12-09 | End: 2024-12-09

## 2024-12-09 RX ORDER — PANTOPRAZOLE 40 MG/1
40 TABLET, DELAYED RELEASE ORAL
Refills: 0 | Status: DISCONTINUED | OUTPATIENT
Start: 2024-12-09 | End: 2025-01-03

## 2024-12-09 RX ORDER — HYDROMORPHONE HCL 4 MG
30 TABLET ORAL
Refills: 0 | Status: DISCONTINUED | OUTPATIENT
Start: 2024-12-09 | End: 2024-12-10

## 2024-12-09 RX ORDER — HYDROMORPHONE HCL 4 MG
1 TABLET ORAL ONCE
Refills: 0 | Status: DISCONTINUED | OUTPATIENT
Start: 2024-12-09 | End: 2024-12-09

## 2024-12-09 RX ORDER — HYDROMORPHONE HCL 4 MG
30 TABLET ORAL
Refills: 0 | Status: DISCONTINUED | OUTPATIENT
Start: 2024-12-09 | End: 2024-12-09

## 2024-12-09 RX ORDER — SODIUM CHLORIDE 9 MG/ML
500 INJECTION, SOLUTION INTRAVENOUS
Refills: 0 | Status: COMPLETED | OUTPATIENT
Start: 2024-12-09 | End: 2024-12-09

## 2024-12-09 RX ADMIN — Medication 1 MILLIGRAM(S): at 07:46

## 2024-12-09 RX ADMIN — Medication 30 MILLILITER(S): at 20:25

## 2024-12-09 RX ADMIN — Medication 1 MILLIGRAM(S): at 11:16

## 2024-12-09 RX ADMIN — Medication 1 MILLIGRAM(S): at 03:49

## 2024-12-09 RX ADMIN — ALLOPURINOL 50 MILLIGRAM(S): 100 TABLET ORAL at 11:16

## 2024-12-09 RX ADMIN — SODIUM CHLORIDE 75 MILLILITER(S): 9 INJECTION, SOLUTION INTRAVENOUS at 05:48

## 2024-12-09 RX ADMIN — Medication 1 MILLIGRAM(S): at 04:19

## 2024-12-09 RX ADMIN — SENNOSIDES 2 TABLET(S): 8.6 TABLET, FILM COATED ORAL at 21:57

## 2024-12-09 RX ADMIN — SODIUM CHLORIDE 75 MILLILITER(S): 9 INJECTION, SOLUTION INTRAVENOUS at 18:53

## 2024-12-09 RX ADMIN — Medication 30 MILLILITER(S): at 09:49

## 2024-12-09 RX ADMIN — Medication 40 MILLIGRAM(S): at 02:30

## 2024-12-09 RX ADMIN — Medication 25 MILLIGRAM(S): at 19:10

## 2024-12-09 RX ADMIN — SODIUM CHLORIDE 100 MILLILITER(S): 9 INJECTION, SOLUTION INTRAVENOUS at 11:20

## 2024-12-09 NOTE — H&P ADULT - PROBLEM SELECTOR PLAN 1
Patient presents with L rib and back pain s/p trauma  Labs show anemia to 6.0 - patient reports is baseline is ~ 5.5  Retic 21%  Patient likely entering a crisis.  Pain uncontrolled with dilaudid IVP  Will start on dilaudid PCA   Monitor for pain control  Senna daily, miralax prn    Patient follows Dr Jordan of [facility] - called for consultation  Could patient benefit from transfusion for improvement of dyspnea?  History of multiple blood transfusions - takes gabeu outpatient.

## 2024-12-09 NOTE — PATIENT PROFILE ADULT - FALL HARM RISK - HARM RISK INTERVENTIONS

## 2024-12-09 NOTE — H&P ADULT - PROBLEM SELECTOR PLAN 5
Patient reports worsening jaundice, likely 2/2 sickle cell crisis  Also with mild transaminitis  Trend labs

## 2024-12-09 NOTE — CONSULT NOTE ADULT - SUBJECTIVE AND OBJECTIVE BOX
Reason for consult: vaso occlusive crisis    HPI:  Patient is a 44M with a PMH of sickle cell, prior CTAs for acute chest  who presents to the ED for dyspnea.  Patient states that he had a mechanical fall two days ago - landed on his L ribs.  Patient notes significant pain to his ribs, pain radiates to his back as well.  Patient notes that after his fall he has developed significant dyspnea.  States that he cannot walk from room to room in his home without developing SOB.  Reports mild dyspnea prior to the fall but s/p fall it is much more significant.  Patient also complains of bilateral lower extremity swelling - states he was recently discharged from [facility] on lasix.  Patient states that he took the lasix for two weeks as prescribed but still has LE swelling.  Slipped and fell as he was walking in slippers and cannot put on his shoes secondary to edema.  No other complaints.  Tachy to 123 in triage.  Patient initially required 6L O2 via NC, SpOO2 currently 100% on 3L O2 via NC.  Will admit to tele.      (09 Dec 2024 10:05)    Hematology consultation completed for this 45yo M with PMH significant for sickle cell anemia, cirrhosis, hemochromatosis, and gout who presented with a low hemoglobin count from outpatient, also reporting worsening for lower back and lower extremity pain similar to prior sickle cell crisi. Patient has known sickle cell disease, and found to have a critically low hemoglobin level of 5.3 g/dL in office with primary hematologist Dr Jordan of [facility], requiring immediate intervention to prevent further complications. He was previously treated with Oxbryta which has been held, as well as with Jadenu (deferasirox) to manage iron overload which he should still continue  Patient was seen in clinic on  for follow up care  post hospitalisation.  Hgb was stable at 6.4. Patient to continue Jadenu BID and lasix  He now presents to [facility] after sustaining a mechanical fall and is complaining of rib pain with associated shortness of breath    PAST MEDICAL & SURGICAL HISTORY:  Sickle cell anemia      Gout      History of cholecystectomy          FAMILY HISTORY:  Family history of sickle cell trait (Father, Mother)    Patient's father is  (Father)    Patient's mother is  (Mother)        Alochol: Denied  Smoking: Nonsmoker  Drug Use: Denied  Marital Status:         Allergies    penicillin (Pruritus)  hydroxyurea (Other)  ceftriaxone (Anaphylaxis)  piperacillin-tazobactam (Urticaria)    Intolerances        MEDICATIONS  (STANDING):  allopurinol 50 milliGRAM(s) Oral daily  folic acid 1 milliGRAM(s) Oral every 24 hours  furosemide    Tablet 40 milliGRAM(s) Oral daily  HYDROmorphone PCA (5 mG/mL) 30 milliLiter(s) PCA Continuous PCA Continuous  metoprolol tartrate 25 milliGRAM(s) Oral two times a day  pantoprazole    Tablet 40 milliGRAM(s) Oral before breakfast  senna 2 Tablet(s) Oral at bedtime    MEDICATIONS  (PRN):  polyethylene glycol 3350 17 Gram(s) Oral daily PRN Constipation      ROS  No fever, sweats, chills  No epistaxis, HA, sore throat  No CP, SOB, cough, sputum  No n/v/d, abd pain, melena, hematochezia  No edema  No rash  No anxiety  No back pain, joint pain  No bleeding, bruising  No dysuria, hematuria    T(C): 36.6 (24 @ 09:32), Max: 36.9 (24 @ 04:02)  HR: 91 (24 @ 09:32) (82 - 123)  BP: 162/98 (24 @ 09:32) (134/86 - 162/98)  RR: 19 (24 @ 09:32) (19 - 20)  SpO2: 99% (24 @ 09:32) (94% - 100%)  Wt(kg): --    PE  NAD  Awake, alert  Anicteric, MMM  RRR  CTAB  Abd soft, NT, ND  No c/c/e  No rash grossly                            6.0    14.27 )-----------( 334      ( 08 Dec 2024 22:40 )             16.5           131[L]  |  98  |  28[H]  ----------------------------<  108[H]  5.0   |  18[L]  |  1.89[H]    Ca    9.2      08 Dec 2024 22:40  Mg     1.70     -    TPro  7.4  /  Alb  3.9  /  TBili  8.4[H]  /  DBili  x   /  AST  141[H]  /  ALT  63[H]  /  AlkPhos  193[H]     Reason for consult: vaso occlusive crisis    HPI:  Patient is a 44M with a PMH of sickle cell, prior CTAs for acute chest  who presents to the ED for dyspnea.  Patient states that he had a mechanical fall two days ago - landed on his L ribs.  Patient notes significant pain to his ribs, pain radiates to his back as well.  Patient notes that after his fall he has developed significant dyspnea.  States that he cannot walk from room to room in his home without developing SOB.  Reports mild dyspnea prior to the fall but s/p fall it is much more significant.  Patient also complains of bilateral lower extremity swelling - states he was recently discharged from [facility] on lasix.  Patient states that he took the lasix for two weeks as prescribed but still has LE swelling.  Slipped and fell as he was walking in slippers and cannot put on his shoes secondary to edema.  No other complaints.  Tachy to 123 in triage.  Patient initially required 6L O2 via NC, SpOO2 currently 100% on 3L O2 via NC.  Will admit to tele.      (09 Dec 2024 10:05)    Hematology consultation completed for this 45yo M with PMH significant for sickle cell anemia, cirrhosis, hemochromatosis, and gout who presented with a low hemoglobin count from outpatient, also reporting worsening for lower back and lower extremity pain similar to prior sickle cell crisi. Patient has known sickle cell disease, and found to have a critically low hemoglobin level of 5.3 g/dL in office with primary hematologist Dr Jordan of [facility], requiring immediate intervention to prevent further complications. He was previously treated with Oxbryta which has been held, as well as with Jadenu (deferasirox) to manage iron overload which he should still continue  Patient was seen in clinic on  for follow up care  post hospitalisation.  Hgb was stable at 6.4. Patient to continue Jadenu BID and lasix  He now presents to [facility] after sustaining a mechanical fall and is complaining of rib pain with associated shortness of breath    PAST MEDICAL & SURGICAL HISTORY:  Sickle cell anemia  Gout  History of cholecystectomy      FAMILY HISTORY:  Family history of sickle cell trait (Father, Mother)  Patient's father is  (Father)  Patient's mother is  (Mother)      Alochol: Denied  Smoking: Nonsmoker  Drug Use: Denied  Marital Status:     Allergies    penicillin (Pruritus)  hydroxyurea (Other)  ceftriaxone (Anaphylaxis)  piperacillin-tazobactam (Urticaria)    Intolerances        MEDICATIONS  (STANDING):  allopurinol 50 milliGRAM(s) Oral daily  folic acid 1 milliGRAM(s) Oral every 24 hours  furosemide    Tablet 40 milliGRAM(s) Oral daily  HYDROmorphone PCA (5 mG/mL) 30 milliLiter(s) PCA Continuous PCA Continuous  metoprolol tartrate 25 milliGRAM(s) Oral two times a day  pantoprazole    Tablet 40 milliGRAM(s) Oral before breakfast  senna 2 Tablet(s) Oral at bedtime    MEDICATIONS  (PRN):  polyethylene glycol 3350 17 Gram(s) Oral daily PRN Constipation      ROS  No fever, sweats, chills  No epistaxis, HA, sore throat  No CP, SOB, cough, sputum  No n/v/d, abd pain, melena, hematochezia  No edema  No rash  No anxiety  No back pain, joint pain  No bleeding, bruising  No dysuria, hematuria    T(C): 36.6 (24 @ 09:32), Max: 36.9 (24 @ 04:02)  HR: 91 (24 @ 09:32) (82 - 123)  BP: 162/98 (24 @ 09:32) (134/86 - 162/98)  RR: 19 (24 @ 09:32) (19 - 20)  SpO2: 99% (24 @ 09:32) (94% - 100%)  Wt(kg): --    PE  NAD  Awake, alert  Scleral icterus  RRR  CTAB  Abd soft, NT, ND  No c/c/e  No rash grossly                            6.0    14.27 )-----------( 334      ( 08 Dec 2024 22:40 )             16.5           131[L]  |  98  |  28[H]  ----------------------------<  108[H]  5.0   |  18[L]  |  1.89[H]    Ca    9.2      08 Dec 2024 22:40  Mg     1.70         TPro  7.4  /  Alb  3.9  /  TBili  8.4[H]  /  DBili  x   /  AST  141[H]  /  ALT  63[H]  /  AlkPhos  193[H]  12

## 2024-12-09 NOTE — H&P ADULT - NSHPREVIEWOFSYSTEMS_GEN_ALL_CORE
Constitutional: no fever, chills, night sweats  Ears: no hearing changes or ear pain,   Nose: no nasal congestion, sinus pain, or rhinorrhea  Cardio: no chest pain, orthopnea, edema, or palpitations  Resp: dyspnea positive.  No cough, wheezing  GI: no nausea, vomiting, diarrhea, constipation, hematochezia, or melena  : no dysuria, urinary frequency, hematuria  MSK: back pain, rib pain pos   Skin: no rash, pruritis   Neuro: no weakness, dizziness, lightheadedness, syncope   Heme/Lymph: no bruising or bleeding

## 2024-12-09 NOTE — H&P ADULT - ASSESSMENT
Patient is a 44M with a PMH of sickle cell, prior CTAs for acute chest  who presents to the ED for dyspnea.

## 2024-12-09 NOTE — ED PROCEDURE NOTE - ATTENDING CONTRIBUTION TO CARE
MD LOJA:  I was present for the critical portions of this procedure and provided direct attending supervision.

## 2024-12-09 NOTE — H&P ADULT - NSHPPHYSICALEXAM_GEN_ALL_CORE
Physical exam:  General: patient in no acute distress, resting comfortably  Head:  Atraumatic, Normocephalic  Eyes: EOMI, PERRLA, jaundice noted  Neck: Supple, thyroid nontender, non enlarged  Cardio: S1/S2 +ve, regular rate and rhythm, pain on palpation of L ribs  Resp: clear to ausculation bilaterally, no rales or wheezes  GI: abdomen soft, nontender, non distended, no guarding, BS +ve x 4  Ext: 1-2+ pedal edema bilaterally   Neuro: CN 2-12 intact, no significant motor or sensory deficits.  Skin: No rashes or lesions

## 2024-12-09 NOTE — CONSULT NOTE ADULT - NS ATTEND AMEND GEN_ALL_CORE FT
45 y/o M with sickle cell disease who presented following a fall. He denies any pain apart from some side rib pain where he hit himself during the fall. Hgb is 6 which is his outpatient baseline. He had some hypoxia on presentation but chest imaging negative including unremarkable CTA chest. Currently getting pain control with dilaudid. No transfusion indicated.

## 2024-12-09 NOTE — ED ADULT NURSE REASSESSMENT NOTE - NS ED NURSE REASSESS COMMENT FT1
Break covering RN: patient received, appears to be resting comfortably in bed, no complaints noted at this time. Breathing even and unlabored, pallor/diaphoresis not noted. will continue to monitor.
Noted to have hemoglobin of 6.0. Pt stable, no sob, dizziness, chest pain at this time. ESSU 3 RN ok to take patient. ACP provider pages regarding possible blood transfusion. Awaiting call back.
Pt stable. Respirations even and unlabored on 3L NC. Pt TBA, awaiting orders and bed to be assigned. Pt stated he's in pain, provider made aware. Medications given as per current care plan. Safety precautions in place.
received report from night shift RN. Pt resting in stretcher A&Ox4, endorsing pain to the back at this time, 7/10 at this time. states no real relief from medication.  RR equal and unlabored on 3L NC. remains NSR on cardiac monitor. IV intact. Care plan continued. Comfort measures provided. Safety maintained. Awaiting ESSU bed.
right chest port accessed, + blood return. labs drawn and sent. pt medicated as per orders, iv fluids infusing well without complications, port site WNL. safety measures maintained, call bell in place at bedside
Received from mid shift RN stable. Respirations even and unlabored on 3L NC. Pt awaiting CTA. Pt states he is feeling better with the SOB, but is still in pain. Pt was provided with pain medications upon this RN coming to the area. Safety precautions in place.

## 2024-12-09 NOTE — H&P ADULT - PROBLEM SELECTOR PLAN 3
Cr 1.8 - was as low as 1.5 last month  Patient took 2 wks of lasix as prescribed.    Will give light IVF - monitor labs

## 2024-12-09 NOTE — CONSULT NOTE ADULT - ASSESSMENT
44 year old male with history of sickle cell anemia presenting today s/p mechanical fall    Sickle cell disease  -undergoing care with Dr Jordan of [facility]  -H&H 6.0/16.5, consistent with outpatient labs  -chronic hemolysis, Iron overload  -pls limit transfusions, maintain Hgb 5.5  -Continue Jadenu  -follow up Dr Jordan after discharge      Fall  -s/p mechanical fall, CTH negative  -admitted with rib pain and SOB on ambulation  -CTA without pulmonary embolism  -Defer pain management to medicine team      will continue to follow    Meghana Real NP  Hematology/Oncology  [facility]  [phone] (Office)  [phone] (Alt office)  Evenings and weekends please call MD on call or office

## 2024-12-09 NOTE — H&P ADULT - HISTORY OF PRESENT ILLNESS
Patient is a 44M with a PMH of sickle cell, prior CTAs for acute chest  who presents to the ED for dyspnea.  Patient states that he had a mechanical fall two days ago - landed on his L ribs.  Patient notes significant pain to his ribs, pain radiates to his back as well.  Patient notes that after his fall he has developed significant dyspnea.  States that he cannot walk from room to room in his home without developing SOB.  Reports mild dyspnea prior to the fall but s/p fall it is much more significant.  Patient also complains of bilateral lower extremity swelling - states he was recently discharged from [facility] on lasix.  Patient states that he took the lasix for two weeks as prescribed but still has LE swelling.  Slipped and fell as he was walking in slippers and cannot put on his shoes secondary to edema.  No other complaints.  Tachy to 123 in triage.  Patient initially required 6L O2 via NC, SpOO2 currently 100% on 3L O2 via NC.  Will admit to tele.

## 2024-12-09 NOTE — PATIENT PROFILE ADULT - HOW PATIENT ADDRESSED, PROFILE
"Occupational Therapy Evaluation completed.   Functional Status:  Pt presents to skilled OT services following worsening of decubital ulcer now post I&D and wound vac in place, h/o Epithelioid hemangioendothelioma, reports for past 2 years have been requiring assist from spouse with ADLs and IADLs, FWW use in home and w/c for community. Pt performed bed mobility with min a for LLE( verbalized \"it's dead\") however pt is able to position LLE self with donning of socks, able to WB during standing and take side steps w/o facilitation of LLE, max a for donning/doffing socks, STS eob with min a, side step min a using FWW, declined all other ADLs and functional activities at this time. Pt with blunted affect during session, appears to have some behavior component? Per pt, she feels comfortable returning home at current functioning level stating spouse doesn't work and can provide physical assist as needed. Pt will benefit from acute skilled OT services while in house.   Plan of Care: Will benefit from Occupational Therapy 3 times per week  Discharge Recommendations:  Equipment: No Equipment Needed and Will Continue to Assess for Equipment Needs. Post-acute therapy Recommend home health for continued occupational therapy services as long as spouse is able to provide care at current functioning.       See \"Rehab Therapy-Acute\" Patient Summary Report for complete documentation.    " Alex

## 2024-12-09 NOTE — H&P ADULT - PROBLEM SELECTOR PLAN 6
Patient reports stopping hydralazine due to tachycardia  Will start metoprolol for now as /98 currently  Trend BP

## 2024-12-09 NOTE — H&P ADULT - NSHPLABSRESULTS_GEN_ALL_CORE
Recent Vitals  T(C): 36.6 (12-09-24 @ 09:32), Max: 36.9 (12-09-24 @ 04:02)  HR: 91 (12-09-24 @ 09:32) (82 - 123)  BP: 162/98 (12-09-24 @ 09:32) (134/86 - 162/98)  RR: 19 (12-09-24 @ 09:32) (19 - 20)  SpO2: 99% (12-09-24 @ 09:32) (94% - 100%)                        6.0    14.27 )-----------( 334      ( 08 Dec 2024 22:40 )             16.5     12-08    131[L]  |  98  |  28[H]  ----------------------------<  108[H]  5.0   |  18[L]  |  1.89[H]    Ca    9.2      08 Dec 2024 22:40  Mg     1.70     12-08    TPro  7.4  /  Alb  3.9  /  TBili  8.4[H]  /  DBili  x   /  AST  141[H]  /  ALT  63[H]  /  AlkPhos  193[H]  12-08    PT/INR - ( 08 Dec 2024 22:40 )   PT: 13.3 sec;   INR: 1.12 ratio         PTT - ( 08 Dec 2024 22:40 )  PTT:32.7 sec  LIVER FUNCTIONS - ( 08 Dec 2024 22:40 )  Alb: 3.9 g/dL / Pro: 7.4 g/dL / ALK PHOS: 193 U/L / ALT: 63 U/L / AST: 141 U/L / GGT: x           Urinalysis Basic - ( 08 Dec 2024 22:40 )    Color: x / Appearance: x / SG: x / pH: x  Gluc: 108 mg/dL / Ketone: x  / Bili: x / Urobili: x   Blood: x / Protein: x / Nitrite: x   Leuk Esterase: x / RBC: x / WBC x   Sq Epi: x / Non Sq Epi: x / Bacteria: x          allopurinol 50 milliGRAM(s) Oral daily  folic acid 1 milliGRAM(s) Oral every 24 hours  HYDROmorphone PCA (5 mG/mL) 30 milliLiter(s) PCA Continuous PCA Continuous  pantoprazole    Tablet 40 milliGRAM(s) Oral before breakfast  polyethylene glycol 3350 17 Gram(s) Oral daily PRN  senna 2 Tablet(s) Oral at bedtime  sodium chloride 0.45%. 1000 milliLiter(s) IV Continuous <Continuous>    Home Medications:  folic acid 1 mg oral tablet: 1 tab(s) orally every 24 hours (09 Dec 2024 10:03)  omeprazole 20 mg oral delayed release capsule: 1 cap(s) orally once a day (09 Dec 2024 10:03)  oxyCODONE 30 mg oral tablet: 1 tab(s) orally every 4 hours as needed for  severe pain (09 Dec 2024 10:03)  polyethylene glycol 3350 oral powder for reconstitution: 17 gram(s) orally once a day (09 Dec 2024 10:03)

## 2024-12-09 NOTE — H&P ADULT - PROBLEM SELECTOR PLAN 2
Per ED notes - patient hypoxic in the 80s on RA  Currently 100% on 3L O2 via NC.  Wean O2 as tolerated.      Patient admits to severe L rib pain with deep breathing.    Consider Pulm eval if patient does not improve    CTA chest shows no infiltrates, negative for PE.

## 2024-12-10 LAB
ALBUMIN SERPL ELPH-MCNC: 4 G/DL — SIGNIFICANT CHANGE UP (ref 3.3–5)
ALP SERPL-CCNC: 187 U/L — HIGH (ref 40–120)
ALT FLD-CCNC: 56 U/L — HIGH (ref 4–41)
ANION GAP SERPL CALC-SCNC: 13 MMOL/L — SIGNIFICANT CHANGE UP (ref 7–14)
AST SERPL-CCNC: 122 U/L — HIGH (ref 4–40)
BILIRUB DIRECT SERPL-MCNC: 3.2 MG/DL — HIGH (ref 0–0.3)
BILIRUB INDIRECT FLD-MCNC: 3.4 MG/DL — HIGH (ref 0–1)
BILIRUB SERPL-MCNC: 6.6 MG/DL — HIGH (ref 0.2–1.2)
BUN SERPL-MCNC: 33 MG/DL — HIGH (ref 7–23)
CALCIUM SERPL-MCNC: 9.2 MG/DL — SIGNIFICANT CHANGE UP (ref 8.4–10.5)
CHLORIDE SERPL-SCNC: 102 MMOL/L — SIGNIFICANT CHANGE UP (ref 98–107)
CO2 SERPL-SCNC: 21 MMOL/L — LOW (ref 22–31)
CREAT SERPL-MCNC: 2.46 MG/DL — HIGH (ref 0.5–1.3)
EGFR: 32 ML/MIN/1.73M2 — LOW
GLUCOSE SERPL-MCNC: 100 MG/DL — HIGH (ref 70–99)
HCT VFR BLD CALC: 17 % — CRITICAL LOW (ref 39–50)
HGB BLD-MCNC: 5.8 G/DL — CRITICAL LOW (ref 13–17)
MCHC RBC-ENTMCNC: 30.1 PG — SIGNIFICANT CHANGE UP (ref 27–34)
MCHC RBC-ENTMCNC: 34.1 G/DL — SIGNIFICANT CHANGE UP (ref 32–36)
MCV RBC AUTO: 88.1 FL — SIGNIFICANT CHANGE UP (ref 80–100)
NRBC # BLD: 2 /100 WBCS — HIGH (ref 0–0)
NRBC # FLD: 0.26 K/UL — HIGH (ref 0–0)
PLATELET # BLD AUTO: 319 K/UL — SIGNIFICANT CHANGE UP (ref 150–400)
POTASSIUM SERPL-MCNC: 5 MMOL/L — SIGNIFICANT CHANGE UP (ref 3.5–5.3)
POTASSIUM SERPL-SCNC: 5 MMOL/L — SIGNIFICANT CHANGE UP (ref 3.5–5.3)
PROT SERPL-MCNC: 7.3 G/DL — SIGNIFICANT CHANGE UP (ref 6–8.3)
RBC # BLD: 1.93 M/UL — LOW (ref 4.2–5.8)
RBC # FLD: 25.8 % — HIGH (ref 10.3–14.5)
SODIUM SERPL-SCNC: 136 MMOL/L — SIGNIFICANT CHANGE UP (ref 135–145)
WBC # BLD: 12.15 K/UL — HIGH (ref 3.8–10.5)
WBC # FLD AUTO: 12.15 K/UL — HIGH (ref 3.8–10.5)

## 2024-12-10 PROCEDURE — 99233 SBSQ HOSP IP/OBS HIGH 50: CPT

## 2024-12-10 RX ORDER — HYDROMORPHONE HCL 4 MG
30 TABLET ORAL
Refills: 0 | Status: DISCONTINUED | OUTPATIENT
Start: 2024-12-10 | End: 2024-12-17

## 2024-12-10 RX ORDER — SODIUM CHLORIDE 9 MG/ML
1000 INJECTION, SOLUTION INTRAVENOUS
Refills: 0 | Status: DISCONTINUED | OUTPATIENT
Start: 2024-12-10 | End: 2024-12-13

## 2024-12-10 RX ADMIN — Medication 25 MILLIGRAM(S): at 05:51

## 2024-12-10 RX ADMIN — Medication 25 MILLIGRAM(S): at 17:05

## 2024-12-10 RX ADMIN — Medication 30 MILLILITER(S): at 12:28

## 2024-12-10 RX ADMIN — SODIUM CHLORIDE 75 MILLILITER(S): 9 INJECTION, SOLUTION INTRAVENOUS at 08:24

## 2024-12-10 RX ADMIN — ALLOPURINOL 50 MILLIGRAM(S): 100 TABLET ORAL at 11:14

## 2024-12-10 RX ADMIN — Medication 30 MILLILITER(S): at 08:23

## 2024-12-10 RX ADMIN — PANTOPRAZOLE 40 MILLIGRAM(S): 40 TABLET, DELAYED RELEASE ORAL at 05:51

## 2024-12-10 RX ADMIN — SODIUM CHLORIDE 75 MILLILITER(S): 9 INJECTION, SOLUTION INTRAVENOUS at 11:05

## 2024-12-10 RX ADMIN — SODIUM CHLORIDE 75 MILLILITER(S): 9 INJECTION, SOLUTION INTRAVENOUS at 20:42

## 2024-12-10 RX ADMIN — Medication 30 MILLILITER(S): at 20:37

## 2024-12-10 RX ADMIN — SENNOSIDES 2 TABLET(S): 8.6 TABLET, FILM COATED ORAL at 23:27

## 2024-12-10 RX ADMIN — Medication 40 MILLIGRAM(S): at 05:51

## 2024-12-10 RX ADMIN — Medication 1 MILLIGRAM(S): at 11:13

## 2024-12-10 NOTE — PROGRESS NOTE ADULT - PROBLEM SELECTOR PLAN 1
Patient presents with L rib and back pain s/p trauma  Labs show anemia to 6.0 - patient reports is baseline is ~ 5.5  Retic 21%  Patient likely entering a crisis.  Pain uncontrolled with dilaudid IVP  Adust  dilaudid PCA , increase demand dose from 0.75 to 1 mg   Monitor for pain control  Senna daily, miralax prn    Patient follows Dr Jordan of [facility] - called for consultation  Could patient benefit from transfusion for improvement of dyspnea?  History of multiple blood transfusions - takes [facility] outpatient.

## 2024-12-10 NOTE — PROGRESS NOTE ADULT - ASSESSMENT
44 year old male with history of sickle cell anemia presenting today s/p mechanical fall    Sickle cell disease  -undergoing care with Dr Jordan of [facility]  -H&H 5.8/17.0 consistent with outpatient labs  -chronic hemolysis, Iron overload  -pls limit transfusions, maintain Hgb 5.5  -Continue Jadenu  -follow up Dr Jordan after discharge      Fall  -s/p mechanical fall, CTH negative  -admitted with rib pain and SOB on ambulation  -CTA without pulmonary embolism  -Defer pain management to medicine team      will continue to follow    Meghana Real NP  Hematology/Oncology  [facility]  [phone] (Office)  [phone] (Alt office)  Evenings and weekends please call MD on call or office

## 2024-12-10 NOTE — PROGRESS NOTE ADULT - SUBJECTIVE AND OBJECTIVE BOX
Medicine Progress Note    Patient is a 44y old  Male who presents with a chief complaint of [facility] fall, pain, dyspnea (10 Dec 2024 11:16)      SUBJECTIVE / OVERNIGHT EVENTS: pain not controlled with current pain PCA     ADDITIONAL REVIEW OF SYSTEMS: negative     MEDICATIONS  (STANDING):  allopurinol 50 milliGRAM(s) Oral daily  folic acid 1 milliGRAM(s) Oral every 24 hours  furosemide    Tablet 40 milliGRAM(s) Oral daily  HYDROmorphone PCA (5 mG/mL) 30 milliLiter(s) PCA Continuous PCA Continuous  influenza   Vaccine 0.5 milliLiter(s) IntraMuscular once  metoprolol tartrate 25 milliGRAM(s) Oral two times a day  pantoprazole    Tablet 40 milliGRAM(s) Oral before breakfast  senna 2 Tablet(s) Oral at bedtime  sodium chloride 0.45%. 1000 milliLiter(s) (75 mL/Hr) IV Continuous <Continuous>    MEDICATIONS  (PRN):  naloxone Injectable 0.1 milliGRAM(s) IV Push once PRN Opioid Overdose  polyethylene glycol 3350 17 Gram(s) Oral daily PRN Constipation    CAPILLARY BLOOD GLUCOSE        I&O's Summary      PHYSICAL EXAM:  Vital Signs Last 24 Hrs  T(C): 36.7 (10 Dec 2024 12:24), Max: 36.8 (10 Dec 2024 08:24)  T(F): 98 (10 Dec 2024 12:24), Max: 98.2 (10 Dec 2024 08:24)  HR: 80 (10 Dec 2024 12:24) (80 - 96)  BP: 127/70 (10 Dec 2024 12:24) (117/71 - 155/85)  BP(mean): --  RR: 16 (10 Dec 2024 12:24) (16 - 20)  SpO2: 98% (10 Dec 2024 12:24) (94% - 100%)    Parameters below as of 10 Dec 2024 12:24  Patient On (Oxygen Delivery Method): nasal cannula  O2 Flow (L/min): 3    CONSTITUTIONAL: mild distress sec to pain   ENMT: Moist oral mucosa, no pharyngeal injection or exudates;   RESPIRATORY: Normal respiratory effort; lungs are dim  to auscultation bilaterally  CARDIOVASCULAR: Regular rate and rhythm, normal S1 and S2, ; b/l lower extremity edema;   ABDOMEN: Nontender to palpation, normoactive bowel sounds, no rebound/guarding;   PSYCH: A+O to person, place, and time; affect appropriate  NEUROLOGY: CN 2-12 are intact and symmetric; no gross sensory deficits   SKIN: No rashes;   LABS:                        5.8    12.15 )-----------( 319      ( 10 Dec 2024 06:26 )             17.0     12-10    136  |  102  |  33[H]  ----------------------------<  100[H]  5.0   |  21[L]  |  2.46[H]    Ca    9.2      10 Dec 2024 06:26  Mg     1.70     12-08    TPro  7.3  /  Alb  4.0  /  TBili  6.6[H]  /  DBili  3.2[H]  /  AST  122[H]  /  ALT  56[H]  /  AlkPhos  187[H]  12-10    PT/INR - ( 08 Dec 2024 22:40 )   PT: 13.3 sec;   INR: 1.12 ratio         PTT - ( 08 Dec 2024 22:40 )  PTT:32.7 sec      Urinalysis Basic - ( 10 Dec 2024 06:26 )    Color: x / Appearance: x / SG: x / pH: x  Gluc: 100 mg/dL / Ketone: x  / Bili: x / Urobili: x   Blood: x / Protein: x / Nitrite: x   Leuk Esterase: x / RBC: x / WBC x   Sq Epi: x / Non Sq Epi: x / Bacteria: x            RADIOLOGY & ADDITIONAL TESTS:  Imaging from Last 24 Hours:    Electrocardiogram/QTc Interval:    COORDINATION OF CARE:  Care Discussed with Consultants/Other Providers: RENNY

## 2024-12-10 NOTE — PROGRESS NOTE ADULT - SUBJECTIVE AND OBJECTIVE BOX
Patient is a 44y old  Male who presents with a chief complaint of mech fall, pain, dyspnea (09 Dec 2024 11:28)  Patient Hgb today 5.8, no evidence ACS, 02 sat 96% 3L NC      MEDICATIONS  (STANDING):  allopurinol 50 milliGRAM(s) Oral daily  folic acid 1 milliGRAM(s) Oral every 24 hours  furosemide    Tablet 40 milliGRAM(s) Oral daily  HYDROmorphone PCA (5 mG/mL) 30 milliLiter(s) PCA Continuous PCA Continuous  influenza   Vaccine 0.5 milliLiter(s) IntraMuscular once  metoprolol tartrate 25 milliGRAM(s) Oral two times a day  pantoprazole    Tablet 40 milliGRAM(s) Oral before breakfast  senna 2 Tablet(s) Oral at bedtime  sodium chloride 0.45%. 1000 milliLiter(s) (75 mL/Hr) IV Continuous <Continuous>    MEDICATIONS  (PRN):  naloxone Injectable 0.1 milliGRAM(s) IV Push once PRN Opioid Overdose  polyethylene glycol 3350 17 Gram(s) Oral daily PRN Constipation        Vital Signs Last 24 Hrs  T(C): 36.4 (10 Dec 2024 10:16), Max: 36.7 (09 Dec 2024 17:59)  T(F): 97.5 (10 Dec 2024 10:16), Max: 98.1 (10 Dec 2024 05:50)  HR: 83 (10 Dec 2024 10:16) (83 - 96)  BP: 117/71 (10 Dec 2024 10:16) (117/71 - 155/85)  BP(mean): --  RR: 17 (10 Dec 2024 10:16) (16 - 20)  SpO2: 96% (10 Dec 2024 10:16) (94% - 100%)    Parameters below as of 10 Dec 2024 10:16  Patient On (Oxygen Delivery Method): nasal cannula  O2 Flow (L/min): 3      PE  NAD  Awake, alert  Anicteric, MMM  RRR  CTAB  Abd soft, NT, ND  No c/c/e  No rash grossly                            5.8    12.15 )-----------( 319      ( 10 Dec 2024 06:26 )             17.0       12-10    136  |  102  |  33[H]  ----------------------------<  100[H]  5.0   |  21[L]  |  2.46[H]    Ca    9.2      10 Dec 2024 06:26  Mg     1.70     12-08    TPro  7.3  /  Alb  4.0  /  TBili  6.6[H]  /  DBili  3.2[H]  /  AST  122[H]  /  ALT  56[H]  /  AlkPhos  187[H]  12-10

## 2024-12-11 LAB
ANION GAP SERPL CALC-SCNC: 19 MMOL/L — HIGH (ref 7–14)
BUN SERPL-MCNC: 46 MG/DL — HIGH (ref 7–23)
CALCIUM SERPL-MCNC: 9.1 MG/DL — SIGNIFICANT CHANGE UP (ref 8.4–10.5)
CHLORIDE SERPL-SCNC: 99 MMOL/L — SIGNIFICANT CHANGE UP (ref 98–107)
CO2 SERPL-SCNC: 18 MMOL/L — LOW (ref 22–31)
CREAT SERPL-MCNC: 2.32 MG/DL — HIGH (ref 0.5–1.3)
EGFR: 34 ML/MIN/1.73M2 — LOW
GLUCOSE SERPL-MCNC: 110 MG/DL — HIGH (ref 70–99)
HCT VFR BLD CALC: 16.6 % — CRITICAL LOW (ref 39–50)
HGB BLD-MCNC: 5.8 G/DL — CRITICAL LOW (ref 13–17)
MAGNESIUM SERPL-MCNC: 1.9 MG/DL — SIGNIFICANT CHANGE UP (ref 1.6–2.6)
MCHC RBC-ENTMCNC: 30.5 PG — SIGNIFICANT CHANGE UP (ref 27–34)
MCHC RBC-ENTMCNC: 34.9 G/DL — SIGNIFICANT CHANGE UP (ref 32–36)
MCV RBC AUTO: 87.4 FL — SIGNIFICANT CHANGE UP (ref 80–100)
NRBC # BLD: 2 /100 WBCS — HIGH (ref 0–0)
NRBC # FLD: 0.43 K/UL — HIGH (ref 0–0)
PHOSPHATE SERPL-MCNC: 6.1 MG/DL — HIGH (ref 2.5–4.5)
PLATELET # BLD AUTO: 321 K/UL — SIGNIFICANT CHANGE UP (ref 150–400)
POTASSIUM SERPL-MCNC: 5.1 MMOL/L — SIGNIFICANT CHANGE UP (ref 3.5–5.3)
POTASSIUM SERPL-SCNC: 5.1 MMOL/L — SIGNIFICANT CHANGE UP (ref 3.5–5.3)
RBC # BLD: 1.9 M/UL — LOW (ref 4.2–5.8)
RBC # FLD: 24.2 % — HIGH (ref 10.3–14.5)
SODIUM SERPL-SCNC: 136 MMOL/L — SIGNIFICANT CHANGE UP (ref 135–145)
WBC # BLD: 18.78 K/UL — HIGH (ref 3.8–10.5)
WBC # FLD AUTO: 18.78 K/UL — HIGH (ref 3.8–10.5)

## 2024-12-11 PROCEDURE — 99232 SBSQ HOSP IP/OBS MODERATE 35: CPT

## 2024-12-11 PROCEDURE — 71046 X-RAY EXAM CHEST 2 VIEWS: CPT | Mod: 26

## 2024-12-11 PROCEDURE — 93010 ELECTROCARDIOGRAM REPORT: CPT

## 2024-12-11 RX ORDER — MAG HYDROX/ALUMINUM HYD/SIMETH 200-200-20
30 SUSPENSION, ORAL (FINAL DOSE FORM) ORAL ONCE
Refills: 0 | Status: COMPLETED | OUTPATIENT
Start: 2024-12-11 | End: 2024-12-11

## 2024-12-11 RX ORDER — FAMOTIDINE 20 MG/1
20 TABLET, FILM COATED ORAL ONCE
Refills: 0 | Status: COMPLETED | OUTPATIENT
Start: 2024-12-11 | End: 2024-12-11

## 2024-12-11 RX ADMIN — Medication 25 MILLIGRAM(S): at 06:49

## 2024-12-11 RX ADMIN — Medication 1 MILLIGRAM(S): at 09:37

## 2024-12-11 RX ADMIN — ALLOPURINOL 50 MILLIGRAM(S): 100 TABLET ORAL at 09:37

## 2024-12-11 RX ADMIN — Medication 30 MILLILITER(S): at 20:54

## 2024-12-11 RX ADMIN — Medication 30 MILLILITER(S): at 20:24

## 2024-12-11 RX ADMIN — FAMOTIDINE 20 MILLIGRAM(S): 20 TABLET, FILM COATED ORAL at 13:26

## 2024-12-11 RX ADMIN — Medication 30 MILLILITER(S): at 17:24

## 2024-12-11 RX ADMIN — Medication 30 MILLILITER(S): at 08:29

## 2024-12-11 RX ADMIN — PANTOPRAZOLE 40 MILLIGRAM(S): 40 TABLET, DELAYED RELEASE ORAL at 06:49

## 2024-12-11 RX ADMIN — Medication 30 MILLILITER(S): at 01:48

## 2024-12-11 RX ADMIN — Medication 40 MILLIGRAM(S): at 06:49

## 2024-12-11 RX ADMIN — SODIUM CHLORIDE 75 MILLILITER(S): 9 INJECTION, SOLUTION INTRAVENOUS at 09:40

## 2024-12-11 RX ADMIN — SENNOSIDES 2 TABLET(S): 8.6 TABLET, FILM COATED ORAL at 22:16

## 2024-12-11 RX ADMIN — Medication 25 MILLIGRAM(S): at 17:24

## 2024-12-11 NOTE — PROGRESS NOTE ADULT - ASSESSMENT
44 year old male with history of sickle cell anemia presenting today s/p mechanical fall    Sickle cell disease  -undergoing care with Dr Jordan of [facility]  -H&H 5.8/16.6 consistent with outpatient labs  -chronic hemolysis, Iron overload  -pls limit transfusions, maintain Hgb 5.5  -Continue Jadenu  -follow up Dr Jordan after discharge      Fall  -s/p mechanical fall, CTH negative  -admitted with rib pain and SOB on ambulation  -CTA without pulmonary embolism  -Defer pain management to medicine team      will continue to follow    Meghana Real NP  Hematology/Oncology  [facility]  [phone] (Office)  [phone] (Alt office)  Evenings and weekends please call MD on call or office

## 2024-12-11 NOTE — CHART NOTE - NSCHARTNOTEFT_GEN_A_CORE
patient with hx of reflux / takes omeprazole at home . Now with dyspepsia / sob unchanged . vitals stable. Currently on O2 via NC   - continue  protonix while inpatient   - EKG with no acute ST changes   - IV pepcid ordered  -CXR ordered

## 2024-12-11 NOTE — PROGRESS NOTE ADULT - SUBJECTIVE AND OBJECTIVE BOX
Medicine Progress Note    Patient is a 44y old  Male who presents with a chief complaint of [facility] fall, pain, dyspnea (11 Dec 2024 12:37)      SUBJECTIVE / OVERNIGHT EVENTS: pain better controlled with current PCA     ADDITIONAL REVIEW OF SYSTEMS: negative     MEDICATIONS  (STANDING):  allopurinol 50 milliGRAM(s) Oral daily  folic acid 1 milliGRAM(s) Oral every 24 hours  furosemide    Tablet 40 milliGRAM(s) Oral daily  HYDROmorphone PCA (5 mG/mL) 30 milliLiter(s) PCA Continuous PCA Continuous  influenza   Vaccine 0.5 milliLiter(s) IntraMuscular once  metoprolol tartrate 25 milliGRAM(s) Oral two times a day  pantoprazole    Tablet 40 milliGRAM(s) Oral before breakfast  senna 2 Tablet(s) Oral at bedtime  sodium chloride 0.45%. 1000 milliLiter(s) (75 mL/Hr) IV Continuous <Continuous>    MEDICATIONS  (PRN):  naloxone Injectable 0.1 milliGRAM(s) IV Push once PRN Opioid Overdose  polyethylene glycol 3350 17 Gram(s) Oral daily PRN Constipation    CAPILLARY BLOOD GLUCOSE        I&O's Summary      PHYSICAL EXAM:  Vital Signs Last 24 Hrs  T(C): 37.1 (11 Dec 2024 13:08), Max: 37.1 (10 Dec 2024 16:25)  T(F): 98.7 (11 Dec 2024 13:08), Max: 98.8 (10 Dec 2024 16:25)  HR: 87 (11 Dec 2024 13:08) (82 - 93)  BP: 127/95 (11 Dec 2024 13:08) (123/63 - 128/86)  BP(mean): --  RR: 18 (11 Dec 2024 13:08) (16 - 19)  SpO2: 95% (11 Dec 2024 13:08) (95% - 99%)    Parameters below as of 11 Dec 2024 13:08  Patient On (Oxygen Delivery Method): nasal cannula  O2 Flow (L/min): 3    CONSTITUTIONAL: NAD,   ENMT: Moist oral mucosa, no pharyngeal injection or exudates;   RESPIRATORY: Normal respiratory effort; lungs are dim r to auscultation bilaterally  CARDIOVASCULAR: Regular rate and rhythm, normal S1 and S2, b/l  lower extremity edema;   ABDOMEN: Nontender to palpation, normoactive bowel sounds, no rebound/guarding;   PSYCH: A+O to person, place, and time; affect appropriate  NEUROLOGY: CN 2-12 are intact and symmetric; no gross sensory deficits   SKIN: No rashes;     LABS:                        5.8    18.78 )-----------( 321      ( 11 Dec 2024 06:44 )             16.6     12-11    136  |  99  |  46[H]  ----------------------------<  110[H]  5.1   |  18[L]  |  2.32[H]    Ca    9.1      11 Dec 2024 06:44  Phos  6.1     12-11  Mg     1.90     12-11    TPro  7.3  /  Alb  4.0  /  TBili  6.6[H]  /  DBili  3.2[H]  /  AST  122[H]  /  ALT  56[H]  /  AlkPhos  187[H]  12-10          Urinalysis Basic - ( 11 Dec 2024 06:44 )    Color: x / Appearance: x / SG: x / pH: x  Gluc: 110 mg/dL / Ketone: x  / Bili: x / Urobili: x   Blood: x / Protein: x / Nitrite: x   Leuk Esterase: x / RBC: x / WBC x   Sq Epi: x / Non Sq Epi: x / Bacteria: x            RADIOLOGY & ADDITIONAL TESTS:  Imaging from Last 24 Hours:    Electrocardiogram/QTc Interval:    COORDINATION OF CARE:  Care Discussed with Consultants/Other Providers: ACP

## 2024-12-11 NOTE — PROGRESS NOTE ADULT - SUBJECTIVE AND OBJECTIVE BOX
Patient is a 44y old  Male who presents with a chief complaint of mech fall, pain, dyspnea (10 Dec 2024 15:42)  Patient seen, on oxygen, afebrile, Hg 5.8      MEDICATIONS  (STANDING):  allopurinol 50 milliGRAM(s) Oral daily  folic acid 1 milliGRAM(s) Oral every 24 hours  furosemide    Tablet 40 milliGRAM(s) Oral daily  HYDROmorphone PCA (5 mG/mL) 30 milliLiter(s) PCA Continuous PCA Continuous  influenza   Vaccine 0.5 milliLiter(s) IntraMuscular once  metoprolol tartrate 25 milliGRAM(s) Oral two times a day  pantoprazole    Tablet 40 milliGRAM(s) Oral before breakfast  senna 2 Tablet(s) Oral at bedtime  sodium chloride 0.45%. 1000 milliLiter(s) (75 mL/Hr) IV Continuous <Continuous>    MEDICATIONS  (PRN):  naloxone Injectable 0.1 milliGRAM(s) IV Push once PRN Opioid Overdose  polyethylene glycol 3350 17 Gram(s) Oral daily PRN Constipation            Vital Signs Last 24 Hrs  T(C): 36.8 (11 Dec 2024 09:30), Max: 37.1 (10 Dec 2024 16:25)  T(F): 98.2 (11 Dec 2024 09:30), Max: 98.8 (10 Dec 2024 16:25)  HR: 89 (11 Dec 2024 09:30) (82 - 93)  BP: 124/72 (11 Dec 2024 09:30) (123/63 - 128/86)  BP(mean): --  RR: 19 (11 Dec 2024 09:30) (16 - 19)  SpO2: 96% (11 Dec 2024 09:30) (95% - 99%)    Parameters below as of 11 Dec 2024 09:30  Patient On (Oxygen Delivery Method): nasal cannula  O2 Flow (L/min): 3      PE  NAD  Awake, alert  Anicteric, MMM  RRR  CTAB  Abd soft, NT, ND  No c/c/e  No rash grossly                            5.8    18.78 )-----------( 321      ( 11 Dec 2024 06:44 )             16.6       12-11    136  |  99  |  46[H]  ----------------------------<  110[H]  5.1   |  18[L]  |  2.32[H]    Ca    9.1      11 Dec 2024 06:44  Phos  6.1     12-11  Mg     1.90     12-11    TPro  7.3  /  Alb  4.0  /  TBili  6.6[H]  /  DBili  3.2[H]  /  AST  122[H]  /  ALT  56[H]  /  AlkPhos  187[H]  12-10

## 2024-12-11 NOTE — PROGRESS NOTE ADULT - NS ATTEND AMEND GEN_ALL_CORE FT
agree with above. pt still with rib pain likley from fall. no evidence of ACS. will con to manage pain

## 2024-12-11 NOTE — PROVIDER CONTACT NOTE (CRITICAL VALUE NOTIFICATION) - RECOMMENDATIONS
per provider, continue to monitor patient labs and for any bleeding, transfuse when hemoglobin less than 5.5

## 2024-12-11 NOTE — PROGRESS NOTE ADULT - PROBLEM SELECTOR PLAN 1
Patient presents with L rib and back pain s/p trauma  Labs show anemia to 6.0 - patient reports is baseline is ~ 5.5  Retic 21%  Patient likely entering a crisis.  Pain uncontrolled with dilaudid IVP  Adust  dilaudid PCA , increased demand dose from 0.75 to 1 mg   Monitor for pain control better   Senna daily, miralax prn    Patient follows Dr Jordan of [facility] - called for consultation  Could patient benefit from transfusion for improvement of dyspnea?  History of multiple blood transfusions - [facility]

## 2024-12-12 LAB
ADD ON TEST-SPECIMEN IN LAB: SIGNIFICANT CHANGE UP
ADD ON TEST-SPECIMEN IN LAB: SIGNIFICANT CHANGE UP
ALBUMIN SERPL ELPH-MCNC: 3.8 G/DL — SIGNIFICANT CHANGE UP (ref 3.3–5)
ALP SERPL-CCNC: 177 U/L — HIGH (ref 40–120)
ALT FLD-CCNC: 49 U/L — HIGH (ref 4–41)
ANION GAP SERPL CALC-SCNC: 14 MMOL/L — SIGNIFICANT CHANGE UP (ref 7–14)
AST SERPL-CCNC: 101 U/L — HIGH (ref 4–40)
BILIRUB DIRECT SERPL-MCNC: 3.1 MG/DL — HIGH (ref 0–0.3)
BILIRUB INDIRECT FLD-MCNC: 2.4 MG/DL — HIGH (ref 0–1)
BILIRUB SERPL-MCNC: 5.5 MG/DL — HIGH (ref 0.2–1.2)
BUN SERPL-MCNC: 52 MG/DL — HIGH (ref 7–23)
CALCIUM SERPL-MCNC: 8.6 MG/DL — SIGNIFICANT CHANGE UP (ref 8.4–10.5)
CHLORIDE SERPL-SCNC: 100 MMOL/L — SIGNIFICANT CHANGE UP (ref 98–107)
CO2 SERPL-SCNC: 17 MMOL/L — LOW (ref 22–31)
CREAT SERPL-MCNC: 2.05 MG/DL — HIGH (ref 0.5–1.3)
EGFR: 40 ML/MIN/1.73M2 — LOW
GLUCOSE SERPL-MCNC: 98 MG/DL — SIGNIFICANT CHANGE UP (ref 70–99)
HCT VFR BLD CALC: 15.6 % — CRITICAL LOW (ref 39–50)
HGB BLD-MCNC: 5.5 G/DL — CRITICAL LOW (ref 13–17)
LDH SERPL L TO P-CCNC: 890 U/L — HIGH (ref 135–225)
MAGNESIUM SERPL-MCNC: 1.8 MG/DL — SIGNIFICANT CHANGE UP (ref 1.6–2.6)
MCHC RBC-ENTMCNC: 30.7 PG — SIGNIFICANT CHANGE UP (ref 27–34)
MCHC RBC-ENTMCNC: 35.3 G/DL — SIGNIFICANT CHANGE UP (ref 32–36)
MCV RBC AUTO: 87.2 FL — SIGNIFICANT CHANGE UP (ref 80–100)
NRBC # BLD: 3 /100 WBCS — HIGH (ref 0–0)
NRBC # FLD: 0.34 K/UL — HIGH (ref 0–0)
NT-PROBNP SERPL-SCNC: 2672 PG/ML — HIGH
PHOSPHATE SERPL-MCNC: 6.3 MG/DL — HIGH (ref 2.5–4.5)
PLATELET # BLD AUTO: 299 K/UL — SIGNIFICANT CHANGE UP (ref 150–400)
POTASSIUM SERPL-MCNC: 5.4 MMOL/L — HIGH (ref 3.5–5.3)
POTASSIUM SERPL-SCNC: 5.4 MMOL/L — HIGH (ref 3.5–5.3)
PROT SERPL-MCNC: 6.5 G/DL — SIGNIFICANT CHANGE UP (ref 6–8.3)
RBC # BLD: 1.79 M/UL — LOW (ref 4.2–5.8)
RBC # FLD: 23.1 % — HIGH (ref 10.3–14.5)
RETICS #: 306.5 K/UL — HIGH (ref 25–125)
RETICS/RBC NFR: 16.8 % — HIGH (ref 0.5–2.5)
SODIUM SERPL-SCNC: 131 MMOL/L — LOW (ref 135–145)
WBC # BLD: 12.95 K/UL — HIGH (ref 3.8–10.5)
WBC # FLD AUTO: 12.95 K/UL — HIGH (ref 3.8–10.5)

## 2024-12-12 PROCEDURE — 99232 SBSQ HOSP IP/OBS MODERATE 35: CPT

## 2024-12-12 RX ORDER — MAGNESIUM SULFATE 500 MG/ML
1 INJECTION, SOLUTION INTRAMUSCULAR; INTRAVENOUS ONCE
Refills: 0 | Status: COMPLETED | OUTPATIENT
Start: 2024-12-12 | End: 2024-12-12

## 2024-12-12 RX ORDER — SODIUM ZIRCONIUM CYCLOSILICATE 10 G/10G
5 POWDER, FOR SUSPENSION ORAL ONCE
Refills: 0 | Status: COMPLETED | OUTPATIENT
Start: 2024-12-12 | End: 2024-12-12

## 2024-12-12 RX ADMIN — Medication 30 MILLILITER(S): at 20:12

## 2024-12-12 RX ADMIN — Medication 40 MILLIGRAM(S): at 06:31

## 2024-12-12 RX ADMIN — SODIUM ZIRCONIUM CYCLOSILICATE 5 GRAM(S): 10 POWDER, FOR SUSPENSION ORAL at 08:17

## 2024-12-12 RX ADMIN — Medication 30 MILLILITER(S): at 07:38

## 2024-12-12 RX ADMIN — Medication 30 MILLILITER(S): at 08:17

## 2024-12-12 RX ADMIN — Medication 30 MILLILITER(S): at 11:56

## 2024-12-12 RX ADMIN — SENNOSIDES 2 TABLET(S): 8.6 TABLET, FILM COATED ORAL at 21:39

## 2024-12-12 RX ADMIN — PANTOPRAZOLE 40 MILLIGRAM(S): 40 TABLET, DELAYED RELEASE ORAL at 06:31

## 2024-12-12 RX ADMIN — Medication 1 MILLIGRAM(S): at 11:49

## 2024-12-12 RX ADMIN — MAGNESIUM SULFATE 100 GRAM(S): 500 INJECTION, SOLUTION INTRAMUSCULAR; INTRAVENOUS at 08:17

## 2024-12-12 RX ADMIN — Medication 25 MILLIGRAM(S): at 17:28

## 2024-12-12 RX ADMIN — ALLOPURINOL 50 MILLIGRAM(S): 100 TABLET ORAL at 11:49

## 2024-12-12 RX ADMIN — Medication 30 MILLILITER(S): at 19:29

## 2024-12-12 NOTE — PROGRESS NOTE ADULT - SUBJECTIVE AND OBJECTIVE BOX
Medicine Progress Note    Patient is a 44y old  Male who presents with a chief complaint of Per chart review, Patient is a 44-lu-old Male with a PMH of sickle cell, prior CTAs for acute chest who presents to the ED for dyspnea.      (12 Dec 2024 13:29)      SUBJECTIVE / OVERNIGHT EVENTS: no events     ADDITIONAL REVIEW OF SYSTEMS pain controlled on PCA    MEDICATIONS  (STANDING):  allopurinol 50 milliGRAM(s) Oral daily  folic acid 1 milliGRAM(s) Oral every 24 hours  furosemide    Tablet 40 milliGRAM(s) Oral daily  HYDROmorphone PCA (5 mG/mL) 30 milliLiter(s) PCA Continuous PCA Continuous  influenza   Vaccine 0.5 milliLiter(s) IntraMuscular once  metoprolol tartrate 25 milliGRAM(s) Oral two times a day  pantoprazole    Tablet 40 milliGRAM(s) Oral before breakfast  senna 2 Tablet(s) Oral at bedtime  sodium chloride 0.45%. 1000 milliLiter(s) (75 mL/Hr) IV Continuous <Continuous>    MEDICATIONS  (PRN):  naloxone Injectable 0.1 milliGRAM(s) IV Push once PRN Opioid Overdose  polyethylene glycol 3350 17 Gram(s) Oral daily PRN Constipation    CAPILLARY BLOOD GLUCOSE        I&O's Summary    12 Dec 2024 07:01  -  12 Dec 2024 17:32  --------------------------------------------------------  IN: 0 mL / OUT: 850 mL / NET: -850 mL        PHYSICAL EXAM:  Vital Signs Last 24 Hrs  T(C): 36.7 (12 Dec 2024 16:00), Max: 36.7 (12 Dec 2024 16:00)  T(F): 98.1 (12 Dec 2024 16:00), Max: 98.1 (12 Dec 2024 16:00)  HR: 79 (12 Dec 2024 16:00) (77 - 90)  BP: 127/72 (12 Dec 2024 16:00) (118/80 - 137/80)  BP(mean): --  RR: 17 (12 Dec 2024 16:00) (17 - 19)  SpO2: 99% (12 Dec 2024 16:00) (98% - 100%)    Parameters below as of 12 Dec 2024 16:00  Patient On (Oxygen Delivery Method): nasal cannula  O2 Flow (L/min): 3    CONSTITUTIONAL: NAD,   ENMT: Moist oral mucosa, no pharyngeal injection or exudates;   RESPIRATORY: Normal respiratory effort; lungs are clear to auscultation bilaterally  CARDIOVASCULAR: Regular rate and rhythm, normal S1 and S2,b/l chronic  lower extremity edema;  ABDOMEN: Nontender to palpation, normoactive bowel sounds, no rebound/guarding;   PSYCH: A+O to person, place, and time; affect appropriate  NEUROLOGY: CN 2-12 are intact and symmetric; no gross sensory deficits   SKIN: No rashes;      LABS:                        5.5    12.95 )-----------( 299      ( 12 Dec 2024 05:10 )             15.6     12-12    131[L]  |  100  |  52[H]  ----------------------------<  98  5.4[H]   |  17[L]  |  2.05[H]    Ca    8.6      12 Dec 2024 05:10  Phos  6.3     12-12  Mg     1.80     12-12    TPro  6.5  /  Alb  3.8  /  TBili  5.5[H]  /  DBili  3.1[H]  /  AST  101[H]  /  ALT  49[H]  /  AlkPhos  177[H]  12-12          Urinalysis Basic - ( 12 Dec 2024 05:10 )    Color: x / Appearance: x / SG: x / pH: x  Gluc: 98 mg/dL / Ketone: x  / Bili: x / Urobili: x   Blood: x / Protein: x / Nitrite: x   Leuk Esterase: x / RBC: x / WBC x   Sq Epi: x / Non Sq Epi: x / Bacteria: x            RADIOLOGY & ADDITIONAL TESTS:  Imaging from Last 24 Hours:    Electrocardiogram/QTc Interval:    COORDINATION OF CARE:  Care Discussed with Consultants/Other Providers: RENNY

## 2024-12-12 NOTE — DIETITIAN INITIAL EVALUATION ADULT - PERTINENT LABORATORY DATA
12-12    131[L]  |  100  |  52[H]  ----------------------------<  98  5.4[H]   |  17[L]  |  2.05[H]    Ca    8.6      12 Dec 2024 05:10  Phos  6.3     12-12  Mg     1.80     12-12    TPro  6.5  /  Alb  3.8  /  TBili  5.5[H]  /  DBili  3.1[H]  /  AST  101[H]  /  ALT  49[H]  /  AlkPhos  177[H]  12-12

## 2024-12-12 NOTE — PROGRESS NOTE ADULT - PROBLEM SELECTOR PLAN 1
Patient presents with L rib and back pain s/p trauma  Labs show anemia to 6.0 - patient reports is baseline is ~ 5.5  Retic 21%  Patient likely entering a crisis.  Pain uncontrolled with dilaudid IVP  Adust  dilaudid PCA , increased demand dose from 0.75 to 1 mg   Monitor for pain control better   Senna daily, miralax prn    Patient follows Dr Jordan of [facility] - called for consultation  Could patient benefit from transfusion for improvement of dyspnea?  History of multiple blood transfusions - [facility]

## 2024-12-12 NOTE — DIETITIAN INITIAL EVALUATION ADULT - OTHER INFO
Met patient at bedside. Patient alert, A&O x4. Patient reports good appetite in house. Per RN flowsheets intake is %. Patient denies any in house nausea, vomiting, diarrhea, or constipation. Last BM noted on 12/12 per RN flowsheets. Bowel regimen is in placed. No reported chewing/swallowing issues. No known food allergies. Medications notable for Lasix. Labs review, hyperpotassemia (K+ 5.4), hyperphosphatemia (phos 6.3) and hyponatremia (Na+ 131) noted. Please consider repletion of sodium. IV fluids noted for hydration. Encourage PO intake and honor food preferences as able. Discussed with high potassium food list. RD to remain available for further nutritional interventions as indicated  Met patient at bedside. Patient alert, A&O x4. Patient reports good appetite in house. Per RN flowsheets intake is %. Patient denies any in house nausea, vomiting, diarrhea, or constipation. Last BM noted on 12/12 per RN flowsheets. Bowel regimen is in placed. No reported chewing/swallowing issues. No known food allergies. Medications notable for Lasix. Labs review, hyperpotassemia (K+ 5.4), hyperphosphatemia (phos 6.3) and hyponatremia (Na+ 131) noted. Please consider repletion of sodium. IV fluids noted for hydration. Encourage PO intake and honor food preferences as able. Discussed with high potassium and phosphorus food list. RD to remain available for further nutritional interventions as indicated  Met patient at bedside. Patient alert, A&O x4. Patient reports good appetite in house. Per RN flowsheets intake is %. Patient denies any in house nausea, vomiting, diarrhea, or constipation. Last BM noted on 12/12 per RN flowsheets. Bowel regimen is in placed. No reported chewing/swallowing issues. No known food allergies. Medications notable for Lasix. Labs review, hyperkalemia (K+ 5.4), hyperphosphatemia (phos 6.3) and hyponatremia (Na+ 131) noted. Please consider repletion of sodium. IV fluids noted for hydration. Encourage PO intake and honor food preferences as able. Discussed with high potassium and phosphorus food list. RD to remain available for further nutritional interventions as indicated

## 2024-12-12 NOTE — DIETITIAN INITIAL EVALUATION ADULT - PERTINENT MEDS FT
MEDICATIONS  (STANDING):  allopurinol 50 milliGRAM(s) Oral daily  folic acid 1 milliGRAM(s) Oral every 24 hours  furosemide    Tablet 40 milliGRAM(s) Oral daily  HYDROmorphone PCA (5 mG/mL) 30 milliLiter(s) PCA Continuous PCA Continuous  influenza   Vaccine 0.5 milliLiter(s) IntraMuscular once  metoprolol tartrate 25 milliGRAM(s) Oral two times a day  pantoprazole    Tablet 40 milliGRAM(s) Oral before breakfast  senna 2 Tablet(s) Oral at bedtime  sodium chloride 0.45%. 1000 milliLiter(s) (75 mL/Hr) IV Continuous <Continuous>    MEDICATIONS  (PRN):  naloxone Injectable 0.1 milliGRAM(s) IV Push once PRN Opioid Overdose  polyethylene glycol 3350 17 Gram(s) Oral daily PRN Constipation

## 2024-12-12 NOTE — DIETITIAN INITIAL EVALUATION ADULT - PHYSCIAL ASSESSMENT
Patient reports UBW of ~190lbs. Dosing weight 87.1kg/191.6lbs (12/9). Patient denies any weight changes.  Weight trend is fluctuated per [facility] weight history: 86.2kg(10/24); 92.1kg (8/13); 94kg(7/11); 92.1kg(5/10); 90kg(3/17); 88.5kg(1/12); 88kg (12/15). Edema noted, weight fluctuated may occur 2/2 fluid shifts.

## 2024-12-12 NOTE — DIETITIAN INITIAL EVALUATION ADULT - ORAL INTAKE PTA/DIET HISTORY
Lab orders are already in - placed 5/19/17. Please schedule   Patient reports good appetite / PO intake PTA. Denies prior use of nutrition shakes/ vitamins PTA. No other reported issues.

## 2024-12-12 NOTE — DIETITIAN INITIAL EVALUATION ADULT - ADD RECOMMEND
1. Recommend continue with current diet, which remains appropriate at this time.   2. Encourage PO intake and honor food preferences as able.   3. Please consistently document %PO intake in nursing flowsheets  4. Monitor PO intake, Labs, electrolytes, weights, BMs, and skin integrity.

## 2024-12-12 NOTE — DIETITIAN INITIAL EVALUATION ADULT - REASON FOR ADMISSION
Per chart review, Patient is a 44-lu-old Male with a PMH of sickle cell, prior CTAs for acute chest who presents to the ED for dyspnea.

## 2024-12-12 NOTE — DIETITIAN INITIAL EVALUATION ADULT - PROBLEM SELECTOR PLAN 1
Patient presents with L rib and back pain s/p trauma  Labs show anemia to 6.0 - patient reports is baseline is ~ 5.5  Retic 21%  Patient likely entering a crisis.  Pain uncontrolled with dilaudid IVP  Will start on dilaudid PCA   Monitor for pain control  Senna daily, miralax prn    Patient follows Dr Jordan of [facility] - called for consultation  Could patient benefit from transfusion for improvement of dyspnea?  History of multiple blood transfusions - takes [facility] outpatient.

## 2024-12-13 LAB
ALBUMIN SERPL ELPH-MCNC: 3.5 G/DL — SIGNIFICANT CHANGE UP (ref 3.3–5)
ALP SERPL-CCNC: 172 U/L — HIGH (ref 40–120)
ALT FLD-CCNC: 44 U/L — HIGH (ref 4–41)
ANION GAP SERPL CALC-SCNC: 13 MMOL/L — SIGNIFICANT CHANGE UP (ref 7–14)
AST SERPL-CCNC: 103 U/L — HIGH (ref 4–40)
BILIRUB DIRECT SERPL-MCNC: 2.9 MG/DL — HIGH (ref 0–0.3)
BILIRUB INDIRECT FLD-MCNC: 2.1 MG/DL — HIGH (ref 0–1)
BILIRUB SERPL-MCNC: 5 MG/DL — HIGH (ref 0.2–1.2)
BLD GP AB SCN SERPL QL: NEGATIVE — SIGNIFICANT CHANGE UP
BUN SERPL-MCNC: 48 MG/DL — HIGH (ref 7–23)
CALCIUM SERPL-MCNC: 8.5 MG/DL — SIGNIFICANT CHANGE UP (ref 8.4–10.5)
CHLORIDE SERPL-SCNC: 103 MMOL/L — SIGNIFICANT CHANGE UP (ref 98–107)
CO2 SERPL-SCNC: 20 MMOL/L — LOW (ref 22–31)
CREAT SERPL-MCNC: 1.49 MG/DL — HIGH (ref 0.5–1.3)
EGFR: 59 ML/MIN/1.73M2 — LOW
GLUCOSE SERPL-MCNC: 124 MG/DL — HIGH (ref 70–99)
HCT VFR BLD CALC: 14.5 % — CRITICAL LOW (ref 39–50)
HCT VFR BLD CALC: 17.5 % — CRITICAL LOW (ref 39–50)
HGB BLD-MCNC: 4.9 G/DL — CRITICAL LOW (ref 13–17)
HGB BLD-MCNC: 6.4 G/DL — CRITICAL LOW (ref 13–17)
LDH SERPL L TO P-CCNC: 743 U/L — HIGH (ref 135–225)
MAGNESIUM SERPL-MCNC: 1.9 MG/DL — SIGNIFICANT CHANGE UP (ref 1.6–2.6)
MCHC RBC-ENTMCNC: 29.9 PG — SIGNIFICANT CHANGE UP (ref 27–34)
MCHC RBC-ENTMCNC: 30.9 PG — SIGNIFICANT CHANGE UP (ref 27–34)
MCHC RBC-ENTMCNC: 33.8 G/DL — SIGNIFICANT CHANGE UP (ref 32–36)
MCHC RBC-ENTMCNC: 36.6 G/DL — HIGH (ref 32–36)
MCV RBC AUTO: 84.5 FL — SIGNIFICANT CHANGE UP (ref 80–100)
MCV RBC AUTO: 88.4 FL — SIGNIFICANT CHANGE UP (ref 80–100)
NRBC # BLD: 2 /100 WBCS — HIGH (ref 0–0)
NRBC # BLD: 2 /100 WBCS — HIGH (ref 0–0)
NRBC # FLD: 0.2 K/UL — HIGH (ref 0–0)
NRBC # FLD: 0.21 K/UL — HIGH (ref 0–0)
PHOSPHATE SERPL-MCNC: 4.9 MG/DL — HIGH (ref 2.5–4.5)
PLATELET # BLD AUTO: 285 K/UL — SIGNIFICANT CHANGE UP (ref 150–400)
PLATELET # BLD AUTO: 298 K/UL — SIGNIFICANT CHANGE UP (ref 150–400)
POTASSIUM SERPL-MCNC: 4.3 MMOL/L — SIGNIFICANT CHANGE UP (ref 3.5–5.3)
POTASSIUM SERPL-SCNC: 4.3 MMOL/L — SIGNIFICANT CHANGE UP (ref 3.5–5.3)
PROT SERPL-MCNC: 6.6 G/DL — SIGNIFICANT CHANGE UP (ref 6–8.3)
RBC # BLD: 1.64 M/UL — LOW (ref 4.2–5.8)
RBC # BLD: 1.64 M/UL — LOW (ref 4.2–5.8)
RBC # BLD: 2.07 M/UL — LOW (ref 4.2–5.8)
RBC # FLD: 20.2 % — HIGH (ref 10.3–14.5)
RBC # FLD: 22.6 % — HIGH (ref 10.3–14.5)
RETICS #: 258 K/UL — HIGH (ref 25–125)
RETICS/RBC NFR: 15.7 % — HIGH (ref 0.5–2.5)
RH IG SCN BLD-IMP: POSITIVE — SIGNIFICANT CHANGE UP
SODIUM SERPL-SCNC: 136 MMOL/L — SIGNIFICANT CHANGE UP (ref 135–145)
WBC # BLD: 10.36 K/UL — SIGNIFICANT CHANGE UP (ref 3.8–10.5)
WBC # BLD: 9.44 K/UL — SIGNIFICANT CHANGE UP (ref 3.8–10.5)
WBC # FLD AUTO: 10.36 K/UL — SIGNIFICANT CHANGE UP (ref 3.8–10.5)
WBC # FLD AUTO: 9.44 K/UL — SIGNIFICANT CHANGE UP (ref 3.8–10.5)

## 2024-12-13 PROCEDURE — 99232 SBSQ HOSP IP/OBS MODERATE 35: CPT

## 2024-12-13 RX ORDER — CHLORHEXIDINE GLUCONATE 1.2 MG/ML
1 RINSE ORAL DAILY
Refills: 0 | Status: DISCONTINUED | OUTPATIENT
Start: 2024-12-13 | End: 2025-01-03

## 2024-12-13 RX ORDER — SODIUM CHLORIDE 9 MG/ML
1000 INJECTION, SOLUTION INTRAVENOUS
Refills: 0 | Status: DISCONTINUED | OUTPATIENT
Start: 2024-12-13 | End: 2024-12-18

## 2024-12-13 RX ORDER — CHLORHEXIDINE GLUCONATE 1.2 MG/ML
1 RINSE ORAL DAILY
Refills: 0 | Status: DISCONTINUED | OUTPATIENT
Start: 2024-12-13 | End: 2024-12-13

## 2024-12-13 RX ORDER — DIPHENHYDRAMINE HCL 25 MG
25 TABLET ORAL ONCE
Refills: 0 | Status: COMPLETED | OUTPATIENT
Start: 2024-12-13 | End: 2024-12-13

## 2024-12-13 RX ORDER — ACETAMINOPHEN 80 MG/.8ML
1000 SOLUTION/ DROPS ORAL ONCE
Refills: 0 | Status: COMPLETED | OUTPATIENT
Start: 2024-12-13 | End: 2024-12-13

## 2024-12-13 RX ADMIN — ALLOPURINOL 50 MILLIGRAM(S): 100 TABLET ORAL at 11:21

## 2024-12-13 RX ADMIN — Medication 1 MILLIGRAM(S): at 11:21

## 2024-12-13 RX ADMIN — PANTOPRAZOLE 40 MILLIGRAM(S): 40 TABLET, DELAYED RELEASE ORAL at 07:30

## 2024-12-13 RX ADMIN — CHLORHEXIDINE GLUCONATE 1 APPLICATION(S): 1.2 RINSE ORAL at 17:48

## 2024-12-13 RX ADMIN — Medication 25 MILLIGRAM(S): at 05:27

## 2024-12-13 RX ADMIN — SODIUM CHLORIDE 75 MILLILITER(S): 9 INJECTION, SOLUTION INTRAVENOUS at 14:33

## 2024-12-13 RX ADMIN — ACETAMINOPHEN 400 MILLIGRAM(S): 80 SOLUTION/ DROPS ORAL at 11:52

## 2024-12-13 RX ADMIN — Medication 30 MILLILITER(S): at 08:42

## 2024-12-13 RX ADMIN — SODIUM CHLORIDE 75 MILLILITER(S): 9 INJECTION, SOLUTION INTRAVENOUS at 22:26

## 2024-12-13 RX ADMIN — Medication 25 MILLIGRAM(S): at 11:44

## 2024-12-13 RX ADMIN — Medication 40 MILLIGRAM(S): at 05:27

## 2024-12-13 RX ADMIN — Medication 30 MILLILITER(S): at 19:32

## 2024-12-13 RX ADMIN — SENNOSIDES 2 TABLET(S): 8.6 TABLET, FILM COATED ORAL at 21:20

## 2024-12-13 RX ADMIN — Medication 25 MILLIGRAM(S): at 17:57

## 2024-12-13 NOTE — PROGRESS NOTE ADULT - NS ATTEND AMEND GEN_ALL_CORE FT
Agree with above assessment and plan.   Hemoglobin 4.9, needs PRBC. Baseline in the 5s to 6s.   Supportive measures as above   CXR with no consolidation

## 2024-12-13 NOTE — PROGRESS NOTE ADULT - PROBLEM SELECTOR PLAN 1
I will START or STAY ON the medications listed below when I get home from the hospital:    acetaminophen 325 mg oral tablet  -- 2 tab(s) by mouth every 6 hours, As needed, Temp greater or equal to 38C (100.4F), Mild Pain (1 - 3)  -- Indication: For pain    atorvastatin 10 mg oral tablet  -- 1 tab(s) by mouth once a day  -- Indication: For HLD    risperiDONE 0.5 mg oral tablet  -- 1 tab(s) by mouth 2 times a day  -- Indication: For bipolar    Metamucil  -- Indication: For constipation    pantoprazole 40 mg oral delayed release tablet  -- 1 tab(s) by mouth 2 times a day  -- Indication: For Gastrointestinal hemorrhage Patient presents with L rib and back pain s/p trauma  Labs show anemia to 6.0 - patient reports is baseline is ~ 5.5-> 4.5  Retic 21%  Patient likely entering a crisis.  Pain uncontrolled with dilaudid IVP  Adust  dilaudid PCA , increased demand dose from 0.75 to 1 mg   Monitor for pain control better   Senna daily, miralax prn    Patient follows Dr Jordan of [facility] - called for consultation  Could patient benefit from transfusion for improvement of dyspnea?  [facility] of [facility]  Transfuse PRBC today I will START or STAY ON the medications listed below when I get home from the hospital:    acetaminophen 325 mg oral tablet  -- 2 tab(s) by mouth every 6 hours, As needed, Temp greater or equal to 38C (100.4F), Mild Pain (1 - 3)  -- Indication: For pain    atorvastatin 10 mg oral tablet  -- 1 tab(s) by mouth once a day  -- Indication: For HLD    risperiDONE 0.5 mg oral tablet  -- 1 tab(s) by mouth 2 times a day  -- Indication: For bipolar    Metamucil  -- Indication: For constipation    polyethylene glycol 3350 oral powder for reconstitution  -- 17 gram(s) by mouth once a day  -- Indication: For constipation    pantoprazole 40 mg oral delayed release tablet  -- 1 tab(s) by mouth 2 times a day for one month (last day 10/22/18. Then to take 1 tab once a day in the morning there after)  -- Indication: For GI bleed due to NSAIDs

## 2024-12-13 NOTE — PROGRESS NOTE ADULT - SUBJECTIVE AND OBJECTIVE BOX
Patient is a 44y old  Male who presents with a chief complaint of mech fall, pain, dyspnea (12 Dec 2024 17:22)  Pt seen, Hgb 5.5, 02 sat 98% 3L NC      MEDICATIONS  (STANDING):  allopurinol 50 milliGRAM(s) Oral daily  folic acid 1 milliGRAM(s) Oral every 24 hours  furosemide    Tablet 40 milliGRAM(s) Oral daily  HYDROmorphone PCA (5 mG/mL) 30 milliLiter(s) PCA Continuous PCA Continuous  influenza   Vaccine 0.5 milliLiter(s) IntraMuscular once  metoprolol tartrate 25 milliGRAM(s) Oral two times a day  pantoprazole    Tablet 40 milliGRAM(s) Oral before breakfast  senna 2 Tablet(s) Oral at bedtime  sodium chloride 0.45%. 1000 milliLiter(s) (75 mL/Hr) IV Continuous <Continuous>    MEDICATIONS  (PRN):  naloxone Injectable 0.1 milliGRAM(s) IV Push once PRN Opioid Overdose  polyethylene glycol 3350 17 Gram(s) Oral daily PRN Constipation        Vital Signs Last 24 Hrs  T(C): 36.3 (13 Dec 2024 04:15), Max: 36.7 (12 Dec 2024 16:00)  T(F): 97.4 (13 Dec 2024 04:15), Max: 98.1 (12 Dec 2024 16:00)  HR: 70 (13 Dec 2024 05:26) (70 - 93)  BP: 146/91 (13 Dec 2024 05:26) (127/72 - 146/91)  BP(mean): --  RR: 18 (13 Dec 2024 04:15) (17 - 18)  SpO2: 98% (13 Dec 2024 04:15) (97% - 100%)    Parameters below as of 13 Dec 2024 04:15  Patient On (Oxygen Delivery Method): nasal cannula  O2 Flow (L/min): 3      PE  NAD  Awake, alert  Anicteric, MMM  RRR  CTAB  Abd soft, NT, ND  No c/c/e  No rash grossly                            5.5    12.95 )-----------( 299      ( 12 Dec 2024 05:10 )             15.6       12-12    131[L]  |  100  |  52[H]  ----------------------------<  98  5.4[H]   |  17[L]  |  2.05[H]    Ca    8.6      12 Dec 2024 05:10  Phos  6.3     12-12  Mg     1.80     12-12    TPro  6.5  /  Alb  3.8  /  TBili  5.5[H]  /  DBili  3.1[H]  /  AST  101[H]  /  ALT  49[H]  /  AlkPhos  177[H]  12-12       Patient is a 44y old  Male who presents with a chief complaint of mech fall, pain, dyspnea (12 Dec 2024 17:22)  Pt seen, Hgb 4.9, 02 sat 98% 3L NC      MEDICATIONS  (STANDING):  allopurinol 50 milliGRAM(s) Oral daily  folic acid 1 milliGRAM(s) Oral every 24 hours  furosemide    Tablet 40 milliGRAM(s) Oral daily  HYDROmorphone PCA (5 mG/mL) 30 milliLiter(s) PCA Continuous PCA Continuous  influenza   Vaccine 0.5 milliLiter(s) IntraMuscular once  metoprolol tartrate 25 milliGRAM(s) Oral two times a day  pantoprazole    Tablet 40 milliGRAM(s) Oral before breakfast  senna 2 Tablet(s) Oral at bedtime  sodium chloride 0.45%. 1000 milliLiter(s) (75 mL/Hr) IV Continuous <Continuous>    MEDICATIONS  (PRN):  naloxone Injectable 0.1 milliGRAM(s) IV Push once PRN Opioid Overdose  polyethylene glycol 3350 17 Gram(s) Oral daily PRN Constipation        Vital Signs Last 24 Hrs  T(C): 36.3 (13 Dec 2024 04:15), Max: 36.7 (12 Dec 2024 16:00)  T(F): 97.4 (13 Dec 2024 04:15), Max: 98.1 (12 Dec 2024 16:00)  HR: 70 (13 Dec 2024 05:26) (70 - 93)  BP: 146/91 (13 Dec 2024 05:26) (127/72 - 146/91)  BP(mean): --  RR: 18 (13 Dec 2024 04:15) (17 - 18)  SpO2: 98% (13 Dec 2024 04:15) (97% - 100%)    Parameters below as of 13 Dec 2024 04:15  Patient On (Oxygen Delivery Method): nasal cannula  O2 Flow (L/min): 3      PE  NAD  Awake, alert  Anicteric, MMM  RRR  CTAB  Abd soft, NT, ND  No c/c/e  No rash grossly                            5.5    12.95 )-----------( 299      ( 12 Dec 2024 05:10 )             15.6       12-12    131[L]  |  100  |  52[H]  ----------------------------<  98  5.4[H]   |  17[L]  |  2.05[H]    Ca    8.6      12 Dec 2024 05:10  Phos  6.3     12-12  Mg     1.80     12-12    TPro  6.5  /  Alb  3.8  /  TBili  5.5[H]  /  DBili  3.1[H]  /  AST  101[H]  /  ALT  49[H]  /  AlkPhos  177[H]  12-12

## 2024-12-13 NOTE — PROGRESS NOTE ADULT - SUBJECTIVE AND OBJECTIVE BOX
Medicine Progress Note    Patient is a 44y old  Male who presents with a chief complaint of [facility] fall, pain, dyspnea (13 Dec 2024 07:13)      SUBJECTIVE / OVERNIGHT EVENTS: no events, pain better controlled     ADDITIONAL REVIEW OF SYSTEMS: negative     MEDICATIONS  (STANDING):  allopurinol 50 milliGRAM(s) Oral daily  folic acid 1 milliGRAM(s) Oral every 24 hours  furosemide    Tablet 40 milliGRAM(s) Oral daily  HYDROmorphone PCA (5 mG/mL) 30 milliLiter(s) PCA Continuous PCA Continuous  influenza   Vaccine 0.5 milliLiter(s) IntraMuscular once  metoprolol tartrate 25 milliGRAM(s) Oral two times a day  pantoprazole    Tablet 40 milliGRAM(s) Oral before breakfast  senna 2 Tablet(s) Oral at bedtime  sodium chloride 0.45%. 1000 milliLiter(s) (75 mL/Hr) IV Continuous <Continuous>    MEDICATIONS  (PRN):  naloxone Injectable 0.1 milliGRAM(s) IV Push once PRN Opioid Overdose  polyethylene glycol 3350 17 Gram(s) Oral daily PRN Constipation    CAPILLARY BLOOD GLUCOSE        I&O's Summary    12 Dec 2024 07:01  -  13 Dec 2024 07:00  --------------------------------------------------------  IN: 1050 mL / OUT: 2300 mL / NET: -1250 mL    13 Dec 2024 07:01  -  13 Dec 2024 13:39  --------------------------------------------------------  IN: 180 mL / OUT: 800 mL / NET: -620 mL        PHYSICAL EXAM:  Vital Signs Last 24 Hrs  T(C): 36.6 (13 Dec 2024 12:59), Max: 36.8 (13 Dec 2024 09:15)  T(F): 97.8 (13 Dec 2024 12:59), Max: 98.2 (13 Dec 2024 09:15)  HR: 78 (13 Dec 2024 12:59) (70 - 93)  BP: 129/83 (13 Dec 2024 12:59) (127/72 - 146/91)  BP(mean): --  RR: 18 (13 Dec 2024 12:59) (17 - 18)  SpO2: 99% (13 Dec 2024 12:59) (97% - 100%)    Parameters below as of 13 Dec 2024 12:59  Patient On (Oxygen Delivery Method): room air      CONSTITUTIONAL: NAD,   ENMT: Moist oral mucosa, no pharyngeal injection or exudates;   RESPIRATORY: Normal respiratory effort; lungs are clear to auscultation bilaterally  CARDIOVASCULAR: Regular rate and rhythm, normal S1 and S2, ; B/L  lower extremity edema;   ABDOMEN: Nontender to palpation, normoactive bowel sounds, no rebound/guarding;   PSYCH: A+O to person, place, and time; affect appropriate  NEUROLOGY: CN 2-12 are intact and symmetric; no gross sensory deficits   SKIN: No rashes;     LABS:                        4.9    10.36 )-----------( 298      ( 13 Dec 2024 06:38 )             14.5     12-13    136  |  103  |  48[H]  ----------------------------<  124[H]  4.3   |  20[L]  |  1.49[H]    Ca    8.5      13 Dec 2024 06:38  Phos  4.9     12-13  Mg     1.90     12-13    TPro  6.6  /  Alb  3.5  /  TBili  5.0[H]  /  DBili  2.9[H]  /  AST  103[H]  /  ALT  44[H]  /  AlkPhos  172[H]  12-13          Urinalysis Basic - ( 13 Dec 2024 06:38 )    Color: x / Appearance: x / SG: x / pH: x  Gluc: 124 mg/dL / Ketone: x  / Bili: x / Urobili: x   Blood: x / Protein: x / Nitrite: x   Leuk Esterase: x / RBC: x / WBC x   Sq Epi: x / Non Sq Epi: x / Bacteria: x            RADIOLOGY & ADDITIONAL TESTS:  Imaging from Last 24 Hours:    Electrocardiogram/QTc Interval:    COORDINATION OF CARE:  Care Discussed with Consultants/Other Providers: ACP

## 2024-12-13 NOTE — CHART NOTE - NSCHARTNOTEFT_GEN_A_CORE
Discussed with patient, hematology, and Dr. Hernandez will give 1U PRBC as patient's hgb 4.9. Will give IV Benadryl and tylenol per pre-medication protocol for prior transfusion reaction and continue to monitor pt closely.

## 2024-12-13 NOTE — PROGRESS NOTE ADULT - ASSESSMENT
44 year old male with history of sickle cell anemia presenting today s/p mechanical fall    Sickle cell disease  -undergoing care with Dr Jordan of [facility]  -H&H 5.8/16.6 consistent with outpatient labs  -chronic hemolysis, Iron overload  -pls limit transfusions, maintain Hgb 5.5  -Continue Jadenu, pain control  -follow up Dr Jordan after discharge      Fall  -s/p mechanical fall, CTH negative  -admitted with rib pain and SOB on ambulation  -CTA without pulmonary embolism  -Defer pain management to medicine team      will continue to follow    Meghana Real NP  Hematology/Oncology  [facility]  [phone] (Office)  [phone] (Alt office)  Evenings and weekends please call MD on call or office   44 year old male with history of sickle cell anemia presenting today s/p mechanical fall    Sickle cell disease  -undergoing care with Dr Jordan of [facility]  -H&H 4.9 today, please premedicate and give 1 unit PRBC  -chronic hemolysis, Iron overload  -pls limit transfusions, maintain Hgb 5.5  -Continue Jadenu, pain control  -follow up Dr Jordan after discharge      Fall  -s/p mechanical fall, CTH negative  -admitted with rib pain and SOB on ambulation  -CTA without pulmonary embolism  -Defer pain management to medicine team      will continue to follow    Meghana Real NP  Hematology/Oncology  [facility]  [phone] (Office)  [phone] (Alt office)  Evenings and weekends please call MD on call or office

## 2024-12-14 LAB
MRSA PCR RESULT.: SIGNIFICANT CHANGE UP
S AUREUS DNA NOSE QL NAA+PROBE: DETECTED

## 2024-12-14 PROCEDURE — 99232 SBSQ HOSP IP/OBS MODERATE 35: CPT

## 2024-12-14 RX ORDER — MUPIROCIN 2 %
1 OINTMENT (GRAM) TOPICAL
Refills: 0 | Status: COMPLETED | OUTPATIENT
Start: 2024-12-14 | End: 2024-12-19

## 2024-12-14 RX ADMIN — Medication 30 MILLILITER(S): at 04:32

## 2024-12-14 RX ADMIN — Medication 40 MILLIGRAM(S): at 05:30

## 2024-12-14 RX ADMIN — SODIUM CHLORIDE 75 MILLILITER(S): 9 INJECTION, SOLUTION INTRAVENOUS at 21:24

## 2024-12-14 RX ADMIN — SENNOSIDES 2 TABLET(S): 8.6 TABLET, FILM COATED ORAL at 21:24

## 2024-12-14 RX ADMIN — PANTOPRAZOLE 40 MILLIGRAM(S): 40 TABLET, DELAYED RELEASE ORAL at 05:31

## 2024-12-14 RX ADMIN — CHLORHEXIDINE GLUCONATE 1 APPLICATION(S): 1.2 RINSE ORAL at 11:36

## 2024-12-14 RX ADMIN — SODIUM CHLORIDE 75 MILLILITER(S): 9 INJECTION, SOLUTION INTRAVENOUS at 04:05

## 2024-12-14 RX ADMIN — Medication 1 MILLIGRAM(S): at 11:35

## 2024-12-14 RX ADMIN — Medication 30 MILLILITER(S): at 19:24

## 2024-12-14 RX ADMIN — Medication 25 MILLIGRAM(S): at 17:23

## 2024-12-14 RX ADMIN — Medication 30 MILLILITER(S): at 08:25

## 2024-12-14 RX ADMIN — Medication 25 MILLIGRAM(S): at 05:31

## 2024-12-14 RX ADMIN — ALLOPURINOL 50 MILLIGRAM(S): 100 TABLET ORAL at 11:35

## 2024-12-14 NOTE — PROGRESS NOTE ADULT - ASSESSMENT
44 year old male with history of sickle cell anemia presenting today s/p mechanical fall    Sickle cell disease  -undergoing care with Dr Jordan of [facility]  - Hgb 4.9 on 12/13, now s/p 1u PRBCs. AM CBC pending  -chronic hemolysis, Iron overload  -pls limit transfusions, maintain Hgb 5.5  -Continue Jadenu, maximize pain control  -follow up Dr Jordan after discharge    Fall  -s/p mechanical fall, CTH negative  -admitted with rib pain and SOB on ambulation  -CTA without pulmonary embolism  -Defer pain management to medicine team    will continue to follow    Diann Gillespie NP  Hematology/ Oncology  [facility]  [phone] (office)  [phone] (alt office)  Evenings and weekends please call MD on call or office

## 2024-12-14 NOTE — PROGRESS NOTE ADULT - SUBJECTIVE AND OBJECTIVE BOX
Medicine Progress Note    Patient is a 44y old  Male who presents with a chief complaint of [facility] fall, pain, dyspnea (14 Dec 2024 08:28)      SUBJECTIVE / OVERNIGHT EVENTS: received PRBC , now feels better     ADDITIONAL REVIEW OF SYSTEMS: negative     MEDICATIONS  (STANDING):  allopurinol 50 milliGRAM(s) Oral daily  chlorhexidine 2% Cloths 1 Application(s) Topical daily  folic acid 1 milliGRAM(s) Oral every 24 hours  furosemide    Tablet 40 milliGRAM(s) Oral daily  HYDROmorphone PCA (5 mG/mL) 30 milliLiter(s) PCA Continuous PCA Continuous  influenza   Vaccine 0.5 milliLiter(s) IntraMuscular once  metoprolol tartrate 25 milliGRAM(s) Oral two times a day  pantoprazole    Tablet 40 milliGRAM(s) Oral before breakfast  senna 2 Tablet(s) Oral at bedtime  sodium chloride 0.45%. 1000 milliLiter(s) (75 mL/Hr) IV Continuous <Continuous>    MEDICATIONS  (PRN):  naloxone Injectable 0.1 milliGRAM(s) IV Push once PRN Opioid Overdose  polyethylene glycol 3350 17 Gram(s) Oral daily PRN Constipation    CAPILLARY BLOOD GLUCOSE        I&O's Summary    13 Dec 2024 07:01  -  14 Dec 2024 07:00  --------------------------------------------------------  IN: 2000 mL / OUT: 3550 mL / NET: -1550 mL    14 Dec 2024 07:01  -  14 Dec 2024 17:17  --------------------------------------------------------  IN: 0 mL / OUT: 2000 mL / NET: -2000 mL        PHYSICAL EXAM:  Vital Signs Last 24 Hrs  T(C): 36.7 (14 Dec 2024 13:30), Max: 37 (14 Dec 2024 09:30)  T(F): 98 (14 Dec 2024 13:30), Max: 98.6 (14 Dec 2024 09:30)  HR: 81 (14 Dec 2024 13:30) (79 - 88)  BP: 150/79 (14 Dec 2024 13:30) (132/89 - 150/79)  BP(mean): --  RR: 18 (14 Dec 2024 13:30) (18 - 19)  SpO2: 98% (14 Dec 2024 13:30) (97% - 100%)    Parameters below as of 14 Dec 2024 13:30  Patient On (Oxygen Delivery Method): nasal cannula  O2 Flow (L/min): 3    CONSTITUTIONAL: NAD,  ENMT: Moist oral mucosa, no pharyngeal injection or exudates;  RESPIRATORY: Normal respiratory effort; lungs are clear to auscultation bilaterally  CARDIOVASCULAR: Regular rate and rhythm, normal S1 and S2, ;b/l lower extremity edema;   ABDOMEN: Nontender to palpation, normoactive bowel sounds, no rebound/guarding;   PSYCH: A+O to person, place, and time; affect appropriate  NEUROLOGY: CN 2-12 are intact and symmetric; no gross sensory deficits   SKIN: No rashes;     LABS:                        6.4    9.44  )-----------( 285      ( 13 Dec 2024 17:24 )             17.5     12-13    136  |  103  |  48[H]  ----------------------------<  124[H]  4.3   |  20[L]  |  1.49[H]    Ca    8.5      13 Dec 2024 06:38  Phos  4.9     12-13  Mg     1.90     12-13    TPro  6.6  /  Alb  3.5  /  TBili  5.0[H]  /  DBili  2.9[H]  /  AST  103[H]  /  ALT  44[H]  /  AlkPhos  172[H]  12-13          Urinalysis Basic - ( 13 Dec 2024 06:38 )    Color: x / Appearance: x / SG: x / pH: x  Gluc: 124 mg/dL / Ketone: x  / Bili: x / Urobili: x   Blood: x / Protein: x / Nitrite: x   Leuk Esterase: x / RBC: x / WBC x   Sq Epi: x / Non Sq Epi: x / Bacteria: x            RADIOLOGY & ADDITIONAL TESTS:  Imaging from Last 24 Hours:    Electrocardiogram/QTc Interval:    COORDINATION OF CARE:  Care Discussed with Consultants/Other Providers: RENNY

## 2024-12-14 NOTE — PROGRESS NOTE ADULT - SUBJECTIVE AND OBJECTIVE BOX
Patient is a 44y old  Male who presents with a chief complaint of mech fall, pain, dyspnea (13 Dec 2024 13:37)      MEDICATIONS  (STANDING):  allopurinol 50 milliGRAM(s) Oral daily  chlorhexidine 2% Cloths 1 Application(s) Topical daily  folic acid 1 milliGRAM(s) Oral every 24 hours  furosemide    Tablet 40 milliGRAM(s) Oral daily  HYDROmorphone PCA (5 mG/mL) 30 milliLiter(s) PCA Continuous PCA Continuous  influenza   Vaccine 0.5 milliLiter(s) IntraMuscular once  metoprolol tartrate 25 milliGRAM(s) Oral two times a day  pantoprazole    Tablet 40 milliGRAM(s) Oral before breakfast  senna 2 Tablet(s) Oral at bedtime  sodium chloride 0.45%. 1000 milliLiter(s) (75 mL/Hr) IV Continuous <Continuous>    MEDICATIONS  (PRN):  naloxone Injectable 0.1 milliGRAM(s) IV Push once PRN Opioid Overdose  polyethylene glycol 3350 17 Gram(s) Oral daily PRN Constipation        Vital Signs Last 24 Hrs  T(C): 36.7 (14 Dec 2024 05:30), Max: 36.8 (13 Dec 2024 09:15)  T(F): 98 (14 Dec 2024 05:30), Max: 98.2 (13 Dec 2024 09:15)  HR: 85 (14 Dec 2024 05:30) (78 - 90)  BP: 132/89 (14 Dec 2024 05:30) (129/80 - 142/83)  BP(mean): --  RR: 18 (14 Dec 2024 05:30) (17 - 19)  SpO2: 100% (14 Dec 2024 05:30) (97% - 100%)    Parameters below as of 14 Dec 2024 05:30  Patient On (Oxygen Delivery Method): room air      PE  Awake, alert  Anicteric, MMM  RRR  CTAB  Abd soft, NT, ND  No c/c                        6.4    9.44  )-----------( 285      ( 13 Dec 2024 17:24 )             17.5       12-13    136  |  103  |  48[H]  ----------------------------<  124[H]  4.3   |  20[L]  |  1.49[H]    Ca    8.5      13 Dec 2024 06:38  Phos  4.9     12-13  Mg     1.90     12-13    TPro  6.6  /  Alb  3.5  /  TBili  5.0[H]  /  DBili  2.9[H]  /  AST  103[H]  /  ALT  44[H]  /  AlkPhos  172[H]  12-13

## 2024-12-14 NOTE — PROGRESS NOTE ADULT - NS ATTEND AMEND GEN_ALL_CORE FT
Awaiting repeat CBC today, Hgb last improved from 4.9 to 6.4 yesterday after transfusion  Says he is feeling better  No further transfusion at this time  On dilaudid PCA

## 2024-12-14 NOTE — PROGRESS NOTE ADULT - PROBLEM SELECTOR PLAN 1
Patient presents with L rib and back pain s/p trauma  Labs show anemia to 6.0 - patient reports is baseline is ~ 5.5-> 4.5  Retic 21%  Patient likely entering a crisis.  Pain uncontrolled with dilaudid IVP  Adust  dilaudid PCA , increased demand dose from 0.75 to 1 mg   Monitor for pain control better   Senna daily, miralax prn    Patient follows Dr Jordan of [facility] - called for consultation  Could patient benefit from transfusion for improvement of dyspnea?  History of multiple blood transfusions [facility]  Transfuse PRBC today

## 2024-12-15 LAB
ALBUMIN SERPL ELPH-MCNC: 3.8 G/DL — SIGNIFICANT CHANGE UP (ref 3.3–5)
ALP SERPL-CCNC: 186 U/L — HIGH (ref 40–120)
ALT FLD-CCNC: 47 U/L — HIGH (ref 4–41)
ANION GAP SERPL CALC-SCNC: 12 MMOL/L — SIGNIFICANT CHANGE UP (ref 7–14)
AST SERPL-CCNC: 143 U/L — HIGH (ref 4–40)
BILIRUB DIRECT SERPL-MCNC: 2.5 MG/DL — HIGH (ref 0–0.3)
BILIRUB INDIRECT FLD-MCNC: 2.9 MG/DL — HIGH (ref 0–1)
BILIRUB SERPL-MCNC: 5.4 MG/DL — HIGH (ref 0.2–1.2)
BUN SERPL-MCNC: 44 MG/DL — HIGH (ref 7–23)
CALCIUM SERPL-MCNC: 9.1 MG/DL — SIGNIFICANT CHANGE UP (ref 8.4–10.5)
CHLORIDE SERPL-SCNC: 106 MMOL/L — SIGNIFICANT CHANGE UP (ref 98–107)
CO2 SERPL-SCNC: 19 MMOL/L — LOW (ref 22–31)
CREAT SERPL-MCNC: 1.25 MG/DL — SIGNIFICANT CHANGE UP (ref 0.5–1.3)
EGFR: 72 ML/MIN/1.73M2 — SIGNIFICANT CHANGE UP
GLUCOSE SERPL-MCNC: 98 MG/DL — SIGNIFICANT CHANGE UP (ref 70–99)
HCT VFR BLD CALC: 18.4 % — CRITICAL LOW (ref 39–50)
HGB BLD-MCNC: 6.5 G/DL — CRITICAL LOW (ref 13–17)
LDH SERPL L TO P-CCNC: 1105 U/L — HIGH (ref 135–225)
MAGNESIUM SERPL-MCNC: 1.6 MG/DL — SIGNIFICANT CHANGE UP (ref 1.6–2.6)
MCHC RBC-ENTMCNC: 30.4 PG — SIGNIFICANT CHANGE UP (ref 27–34)
MCHC RBC-ENTMCNC: 35.3 G/DL — SIGNIFICANT CHANGE UP (ref 32–36)
MCV RBC AUTO: 86 FL — SIGNIFICANT CHANGE UP (ref 80–100)
NRBC # BLD: 2 /100 WBCS — HIGH (ref 0–0)
NRBC # FLD: 0.13 K/UL — HIGH (ref 0–0)
PHOSPHATE SERPL-MCNC: 3.7 MG/DL — SIGNIFICANT CHANGE UP (ref 2.5–4.5)
PLATELET # BLD AUTO: 294 K/UL — SIGNIFICANT CHANGE UP (ref 150–400)
POTASSIUM SERPL-MCNC: 5 MMOL/L — SIGNIFICANT CHANGE UP (ref 3.5–5.3)
POTASSIUM SERPL-SCNC: 5 MMOL/L — SIGNIFICANT CHANGE UP (ref 3.5–5.3)
PROT SERPL-MCNC: 7.2 G/DL — SIGNIFICANT CHANGE UP (ref 6–8.3)
RBC # BLD: 2.14 M/UL — LOW (ref 4.2–5.8)
RBC # BLD: 2.14 M/UL — LOW (ref 4.2–5.8)
RBC # FLD: 20.9 % — HIGH (ref 10.3–14.5)
RETICS #: 203.7 K/UL — HIGH (ref 25–125)
RETICS/RBC NFR: 9.5 % — HIGH (ref 0.5–2.5)
SODIUM SERPL-SCNC: 137 MMOL/L — SIGNIFICANT CHANGE UP (ref 135–145)
WBC # BLD: 8.62 K/UL — SIGNIFICANT CHANGE UP (ref 3.8–10.5)
WBC # FLD AUTO: 8.62 K/UL — SIGNIFICANT CHANGE UP (ref 3.8–10.5)

## 2024-12-15 PROCEDURE — 99232 SBSQ HOSP IP/OBS MODERATE 35: CPT

## 2024-12-15 RX ORDER — MAGNESIUM SULFATE 500 MG/ML
2 INJECTION, SOLUTION INTRAMUSCULAR; INTRAVENOUS ONCE
Refills: 0 | Status: COMPLETED | OUTPATIENT
Start: 2024-12-15 | End: 2024-12-15

## 2024-12-15 RX ADMIN — Medication 30 MILLILITER(S): at 21:33

## 2024-12-15 RX ADMIN — SENNOSIDES 2 TABLET(S): 8.6 TABLET, FILM COATED ORAL at 21:46

## 2024-12-15 RX ADMIN — MAGNESIUM SULFATE 25 GRAM(S): 500 INJECTION, SOLUTION INTRAMUSCULAR; INTRAVENOUS at 17:18

## 2024-12-15 RX ADMIN — Medication 25 MILLIGRAM(S): at 05:51

## 2024-12-15 RX ADMIN — Medication 25 MILLIGRAM(S): at 17:12

## 2024-12-15 RX ADMIN — Medication 30 MILLILITER(S): at 19:20

## 2024-12-15 RX ADMIN — Medication 1 APPLICATION(S): at 17:15

## 2024-12-15 RX ADMIN — ALLOPURINOL 50 MILLIGRAM(S): 100 TABLET ORAL at 11:41

## 2024-12-15 RX ADMIN — CHLORHEXIDINE GLUCONATE 1 APPLICATION(S): 1.2 RINSE ORAL at 11:42

## 2024-12-15 RX ADMIN — SODIUM CHLORIDE 75 MILLILITER(S): 9 INJECTION, SOLUTION INTRAVENOUS at 05:51

## 2024-12-15 RX ADMIN — Medication 1 APPLICATION(S): at 05:50

## 2024-12-15 RX ADMIN — PANTOPRAZOLE 40 MILLIGRAM(S): 40 TABLET, DELAYED RELEASE ORAL at 05:51

## 2024-12-15 RX ADMIN — Medication 40 MILLIGRAM(S): at 05:51

## 2024-12-15 RX ADMIN — Medication 30 MILLILITER(S): at 08:29

## 2024-12-15 RX ADMIN — Medication 1 MILLIGRAM(S): at 11:40

## 2024-12-15 NOTE — DISCHARGE NOTE PROVIDER - CARE PROVIDERS DIRECT ADDRESSES
[email or web address]

## 2024-12-15 NOTE — PROGRESS NOTE ADULT - PROBLEM SELECTOR PLAN 2
Per ED notes - patient hypoxic in the 80s on RA  Currently on RA       Patient admits to severe L rib pain with deep breathing, improving     CTA chest shows no infiltrates, negative for PE.

## 2024-12-15 NOTE — PROGRESS NOTE ADULT - NS ATTEND AMEND GEN_ALL_CORE FT
Hgb stable  Still reports some pain, appears comfortable on exam   No further transfusion at this time  On dilaudid PCA.

## 2024-12-15 NOTE — PROGRESS NOTE ADULT - SUBJECTIVE AND OBJECTIVE BOX
Patient is a 44y old  Male who presents with a chief complaint of [facility] fall, pain, dyspnea (15 Dec 2024 10:10)    Patient seen and examined  Reports ongoing lower back pain    MEDICATIONS  (STANDING):  allopurinol 50 milliGRAM(s) Oral daily  chlorhexidine 2% Cloths 1 Application(s) Topical daily  folic acid 1 milliGRAM(s) Oral every 24 hours  furosemide    Tablet 40 milliGRAM(s) Oral daily  HYDROmorphone PCA (5 mG/mL) 30 milliLiter(s) PCA Continuous PCA Continuous  influenza   Vaccine 0.5 milliLiter(s) IntraMuscular once  metoprolol tartrate 25 milliGRAM(s) Oral two times a day  mupirocin 2% Nasal 1 Application(s) Both Nostrils two times a day  pantoprazole    Tablet 40 milliGRAM(s) Oral before breakfast  senna 2 Tablet(s) Oral at bedtime  sodium chloride 0.45%. 1000 milliLiter(s) (75 mL/Hr) IV Continuous <Continuous>    MEDICATIONS  (PRN):  naloxone Injectable 0.1 milliGRAM(s) IV Push once PRN Opioid Overdose  polyethylene glycol 3350 17 Gram(s) Oral daily PRN Constipation      Vital Signs Last 24 Hrs  T(C): 36.7 (15 Dec 2024 05:30), Max: 36.7 (14 Dec 2024 13:30)  T(F): 98 (15 Dec 2024 05:30), Max: 98.1 (14 Dec 2024 17:36)  HR: 80 (15 Dec 2024 05:30) (69 - 100)  BP: 136/78 (15 Dec 2024 05:30) (136/78 - 150/79)  BP(mean): --  RR: 18 (15 Dec 2024 05:30) (17 - 18)  SpO2: 99% (15 Dec 2024 05:30) (94% - 100%)    Parameters below as of 15 Dec 2024 05:30  Patient On (Oxygen Delivery Method): room air        PE  Awake, alert  Anicteric, MMM  RRR  CTAB  Abd soft, NT, ND  No c/c  +b/l TJ                            6.5    8.62  )-----------( 294      ( 15 Dec 2024 04:42 )             18.4       12-15    137  |  106  |  44[H]  ----------------------------<  98  5.0   |  19[L]  |  1.25    Ca    9.1      15 Dec 2024 04:42  Phos  3.7     12-15  Mg     1.60     12-15    TPro  7.2  /  Alb  3.8  /  TBili  5.4[H]  /  DBili  2.5[H]  /  AST  143[H]  /  ALT  47[H]  /  AlkPhos  186[H]  12-15

## 2024-12-15 NOTE — DISCHARGE NOTE PROVIDER - NSFOLLOWUPCLINICS_GEN_ALL_ED_FT
[facility]  Gastroenterology  [address] [place] [address] [address]  [place] [address]  Phone: [phone]  Fax:

## 2024-12-15 NOTE — PROGRESS NOTE ADULT - ASSESSMENT
44 year old male with history of sickle cell anemia presenting today s/p mechanical fall    Sickle cell disease  - follows with Dr Jordan of [facility]  - Hgb 4.9 on 12/13, now s/p 1u PRBCs. --> Hgb 6.5  - chronic hemolysis, Iron overload  - pls limit transfusions, maintain Hgb 5.5  - Continue Jadenu, maximize pain control  - ongoing care after discharge    Fall  -s/p mechanical fall, CTH negative  -admitted with rib pain and SOB on ambulation  -CTA without pulmonary embolism  -Defer pain management to medicine team    will continue to follow    Diann Gillespie NP  Hematology/ Oncology  [facility]  [phone] (office)  [phone] (alt office)  Evenings and weekends please call MD on call or office

## 2024-12-15 NOTE — DISCHARGE NOTE PROVIDER - HOSPITAL COURSE
44M with a PMH of sickle cell, prior CTAs for acute chest  who presents to the ED for dyspnea.  Patient states that he had a mechanical fall two days ago - landed on his L ribs.  Patient notes significant pain to his ribs, pain radiates to his back as well.  Patient notes that after his fall he has developed significant dyspnea.  States that he cannot walk from room to room in his home without developing SOB.  Reports mild dyspnea prior to the fall but s/p fall it is much more significant.  Patient also complains of bilateral lower extremity swelling - states he was recently discharged from [facility] on lasix.  Patient states that he took the lasix for two weeks as prescribed but still has LE swelling.  Slipped and fell as he was walking in slippers and cannot put on his shoes secondary to edema.  No other complaints.  Tachy to 123 in triage.  Patient initially required 6L O2 via NC, SpOO2 currently 100% on 3L O2 via NC.      Hospital course : patient pain was controlled with PCA, demand was increased to 1 mg , repeat Doppler  B/L LE  __________    44 year old male with hx sickle cell, prior acute chest, coming in with dyspnea and acute pain, admitted for sickle cell pain crisis complicated by fever and MATA.     #Fever: Febrile during admission with clear CXR, no new focal infectious symptoms, CTA negative earlier in hospitalization for PE. No dysuria, rash or other focal infectious symptoms. Recent staph epi bacteremia during November admission here, seen by ID at that time. Patient has port in place, not appearing acutely infected. No sore throat, cough or other URI symptoms. 12/17/24 Blood cultures negative x 2 at 48 hrs. Lactate nl. Continued leukocytosis, ID following, repeat fever 12/20/24 to 102.3. Patient started on meropenem. ID recommended _____________________________.     #Sickle cell disease. Pain controlled with dilaudid PCA. Hematology followed and provided recs during hospitalization. Patient received 2 units PRBC during admission. Outpt heme follow up after discharge.     #Acute respiratory failure with hypoxia. Per ED notes - patient hypoxic in the 80s on RA. Now on RA. Suspect 2/2 rib pain with breathing now improving. . CTA chest shows no infiltrates, negative for PE. Clear CXR later in hospitalization. No oxygen requirement at this time.    #MATA (acute kidney injury): Resolved. Cr on admission 1.89, paeked at 2.46 then improved to 1. AVOID FURTHER NSAIDs per renal.     #Lower extremity edema. LE duplex - no evidence of deep vein thrombosis in the bilateral lower extremities, subcutaneous edema noted in the bilateral lower extremities. Recent 11/2024 TTE with intact EF. Discharged on PO lasix 40 mg qd _________________.    44 year old male with hx sickle cell, prior acute chest, coming in with dyspnea and acute pain, admitted for sickle cell pain crisis complicated by fever and MATA.     #Fever: Febrile during admission with clear CXR, no new focal infectious symptoms, CTA negative earlier in hospitalization for PE. No dysuria, rash or other focal infectious symptoms. Recent staph epi bacteremia during November admission here, seen by ID at that time. Patient has port in place, not appearing acutely infected. No sore throat, cough or other URI symptoms. 12/17/24 Blood cultures negative x 2 at 48 hrs. Lactate nl. Continued leukocytosis, ID following, repeat fever 12/20/24 to 102.3. Patient started on meropenem for bacteremia . ID recommended _____________________________.     #Sickle cell disease. Pain controlled with dilaudid PCA. Hematology followed and provided recs during hospitalization. Patient received 2 units PRBC during admission. Outpt heme follow up after discharge. s/p Exchange transfusion 12/25, Heme rec -------------    #Acute respiratory failure with hypoxia. Per ED notes - patient hypoxic in the 80s on RA. Now on RA. Suspect 2/2 rib pain with breathing now improving. . CTA chest shows no infiltrates, negative for PE. Clear CXR later in hospitalization. No oxygen requirement at this time.    #MATA (acute kidney injury): Resolved. Cr on admission 1.89, paeked at 2.46 then improved to 1. AVOID FURTHER NSAIDs per renal.     #Lower extremity edema. LE duplex - no evidence of deep vein thrombosis in the bilateral lower extremities, subcutaneous edema noted in the bilateral lower extremities. Recent 11/2024 TTE with intact EF. s/p diuresis   44 year old male with hx sickle cell, prior acute chest, coming in with dyspnea and acute pain, admitted for sickle cell pain crisis complicated by sepsis 2/2 bacteremia, MATA and DVT    Problem/Plan - 1:  ·  Problem: Sepsis.   ·  Plan: Fever to 101.2 at 21:35 on 12/17/24. Reported to have O2 sat in 80s With leukocytosis. focal infectious symptoms. Recent staph epi bacteremia during November admission here, seen by ID at that time. Patient has port in place,   - blood culture with Enterobacterales and pantoea, ID reconsulted, rec to switch meropenem (12/20-12/24) to ertapenem 1 gm q24h (12/24 - ), blood cultures from 12/26 still showing pantoea, repeat blood Cx on 12/28 and 12/31 negative  - IR consulted - s/p mediport [id number]  - echo ordered per ID rec- no e/o endocarditis  - ID rec total 4 weeks of Cipro on discharge      Problem/Plan - 2:  ·  Problem: Sickle cell disease.   ·  Plan: Pt spiking intermittent fever, new CXR showed opacities ( neg CXR x 3 in the past few days), high suspicion for acute chest,   Patient reports hgb baseline is ~ 5.5  - Discussed with Heme, s/p exhange transfusion 12/26 given high suspicion for acute chest syndrome and liver involvement; f/u Hgb Electrophoresis shows HbS: 10.6% (goal <30%); s/p port study 12/26 (port intact, continue to use)  - C/w dilaudid PCA, monitor pain, will gradually wean pt off PCA; pt takes Oxycodone 30 mg PRN at home  - Bowel regimen with Senna daily, miralax prn  - Patient follows Dr Jordan of [facility] - appreciate hem/onc input, s/p 2 unit pRBC this admission. Trend Hgb.   - Pt on Jadenu at home, discussed with Heme onc, hold at this time, plan to resume at time of discharge.     Problem/Plan - 3:  ·  Problem: Acute DVT (deep venous thrombosis).   ·  Plan: RUE duplex + R IJ and brachial DVT  -started on Eliquis 12/31.     Problem/Plan - 4:  ·  Problem: MATA (acute kidney injury).   ·  Plan: Resolved  Cr on admission 1.89, peaked at 2.46 then improved to 1. Now Cr -> 1.38 -> 1.74 -> 1.14 -> 1.04  Continued lower extremity edema (with negative dopplers for DVT and unremarkable 11/2024 TTE with intact EF)   AVOID FURTHER NSAIDs  Appreciate Renal input and recs.     Problem/Plan - 5:  ·  Problem: Lower extremity edema.   ·  Plan: Presented with LE edema  LE duplex - no evidence of deep vein thrombosis in the bilateral lower extremities, subcutaneous edema noted in the bilateral lower extremities. Recent 11/2024 TTE with intact EF  s/p 1 dose of iv lasix 20mg.     Problem/Plan - 6:  ·  Problem: Hyperbilirubinemia.   ·  Plan: Patient reports worsening jaundice, likely 2/2 sickle cell crisis, LFT now improving   Also with mild transaminitis  -started on Ursodiol      Problem/Plan - 7:  ·  Problem: Essential hypertension.   ·  Plan: C/w nifedipine 30mg XL qd (newly started here).

## 2024-12-15 NOTE — DISCHARGE NOTE PROVIDER - PROVIDER TOKENS
PROVIDER:[TOKEN:[[id number]],FOLLOWUP:[2 weeks]],PROVIDER:[TOKEN:[[id number]]],PROVIDER:[TOKEN:[[id number]]]

## 2024-12-15 NOTE — DISCHARGE NOTE PROVIDER - CARE PROVIDER_API CALL
Floridalma Valadez  Infectious Disease  [address] [place] [address]  Phone: [phone]  Fax: [phone]  Follow Up Time: 2 weeks    Nikolai Maxwell  Internal Medicine  [address] [place] [address], [address]  [place] [address]  Phone: [phone]  Fax: [phone]  Follow Up Time:     Yan Currie  Urology  [address] [place] [address], [address]  [place] [address]  Phone: [phone]  Fax: [phone]  Follow Up Time:

## 2024-12-15 NOTE — DISCHARGE NOTE PROVIDER - NSDCCPCAREPLAN_GEN_ALL_CORE_FT
PRINCIPAL DISCHARGE DIAGNOSIS  Diagnosis: Sickle cell pain crisis  Assessment and Plan of Treatment: You were treated with IV dilaudid for pain from your sickle cell disease. Your pain improved. Please follow up closely with your hematologist and PCP after hospital discharge.      SECONDARY DISCHARGE DIAGNOSES  Diagnosis: Fever  Assessment and Plan of Treatment: You had fevers in the hospital which were suspected to be from _______________.    Diagnosis: MATA (acute kidney injury)  Assessment and Plan of Treatment: You had worsening of your kidney function during the hospital stay but it has since improved and returned to normal. Please avoid taking NSAIDs like advil, motrin, ibuprofen as they may impair your kidney function.    Diagnosis: Lower extremity edema  Assessment and Plan of Treatment: You had significant leg swelling but further testing did not reveal any blood clots or significant issues with your heart to explain the swelling. You were started on medication to help you urinate out the excess fluid. Please continue taking all medications as directed and follow up with your PCP after discharge for further monitoring.     PRINCIPAL DISCHARGE DIAGNOSIS  Diagnosis: Sickle cell pain crisis  Assessment and Plan of Treatment: You were treated with IV dilaudid for pain from your sickle cell disease. Your pain improved. Please follow up closely with your hematologist and PCP after hospital discharge.      SECONDARY DISCHARGE DIAGNOSES  Diagnosis: Acute DVT (deep venous thrombosis)  Assessment and Plan of Treatment: You were found to have thrombus in R internal jugular vein. Continue eliquis (blood thinner) as instructed. Follow with your hematologist to determine when to stop    Diagnosis: Essential hypertension  Assessment and Plan of Treatment: You are started on Nifedipine. continue as instructed    Diagnosis: Sepsis  Assessment and Plan of Treatment: You were found to have multiple strains of bacteria in the blood. Your chemoport is removed due to concern of infection. You were treated with iv antibiotics in the hospital. Complete oral antibiotic course  on discharge as instructed    Diagnosis: MATA (acute kidney injury)  Assessment and Plan of Treatment: You had worsening of your kidney function during the hospital stay but it has since improved and returned to normal. Please avoid taking NSAIDs like advil, motrin, ibuprofen as they may impair your kidney function.    Diagnosis: Lower extremity edema  Assessment and Plan of Treatment: You had significant leg swelling but further testing did not reveal any blood clots or significant issues with your heart to explain the swelling. You were started on medication to help you urinate out the excess fluid. Please continue taking all medications as directed and follow up with your PCP after discharge for further monitoring.

## 2024-12-15 NOTE — PROGRESS NOTE ADULT - SUBJECTIVE AND OBJECTIVE BOX
Medicine Progress Note    Patient is a 44y old  Male who presents with a chief complaint of [facility] fall, pain, dyspnea (15 Dec 2024 11:08)      SUBJECTIVE / OVERNIGHT EVENTS: no events over night, pain is controlled     ADDITIONAL REVIEW OF SYSTEMS: negative     MEDICATIONS  (STANDING):  allopurinol 50 milliGRAM(s) Oral daily  chlorhexidine 2% Cloths 1 Application(s) Topical daily  folic acid 1 milliGRAM(s) Oral every 24 hours  furosemide    Tablet 40 milliGRAM(s) Oral daily  HYDROmorphone PCA (5 mG/mL) 30 milliLiter(s) PCA Continuous PCA Continuous  influenza   Vaccine 0.5 milliLiter(s) IntraMuscular once  metoprolol tartrate 25 milliGRAM(s) Oral two times a day  mupirocin 2% Nasal 1 Application(s) Both Nostrils two times a day  pantoprazole    Tablet 40 milliGRAM(s) Oral before breakfast  senna 2 Tablet(s) Oral at bedtime  sodium chloride 0.45%. 1000 milliLiter(s) (75 mL/Hr) IV Continuous <Continuous>    MEDICATIONS  (PRN):  naloxone Injectable 0.1 milliGRAM(s) IV Push once PRN Opioid Overdose  polyethylene glycol 3350 17 Gram(s) Oral daily PRN Constipation    CAPILLARY BLOOD GLUCOSE        I&O's Summary    14 Dec 2024 07:01  -  15 Dec 2024 07:00  --------------------------------------------------------  IN: 2165 mL / OUT: 3750 mL / NET: -1585 mL    15 Dec 2024 07:01  -  15 Dec 2024 15:23  --------------------------------------------------------  IN: 0 mL / OUT: 1900 mL / NET: -1900 mL        PHYSICAL EXAM:  Vital Signs Last 24 Hrs  T(C): 36.7 (15 Dec 2024 05:30), Max: 36.7 (14 Dec 2024 17:36)  T(F): 98 (15 Dec 2024 05:30), Max: 98.1 (14 Dec 2024 17:36)  HR: 80 (15 Dec 2024 05:30) (69 - 100)  BP: 136/78 (15 Dec 2024 05:30) (136/78 - 147/85)  BP(mean): --  RR: 18 (15 Dec 2024 05:30) (17 - 18)  SpO2: 99% (15 Dec 2024 05:30) (94% - 100%)    Parameters below as of 15 Dec 2024 05:30  Patient On (Oxygen Delivery Method): room air      CONSTITUTIONAL: NAD  ENMT: Moist oral mucosa, no pharyngeal injection or exudates;   RESPIRATORY: Normal respiratory effort; lungs are clear to auscultation bilaterally  CARDIOVASCULAR: Regular rate and rhythm, normal S1 and S2,;b/l  lower extremity edema;  ABDOMEN: Nontender to palpation, normoactive bowel sounds, no rebound/guarding;  PSYCH: A+O to person, place, and time; affect appropriate  NEUROLOGY: CN 2-12 are intact and symmetric; no gross sensory deficits   SKIN: No rashes;     LABS:                        6.5    8.62  )-----------( 294      ( 15 Dec 2024 04:42 )             18.4     12-15    137  |  106  |  44[H]  ----------------------------<  98  5.0   |  19[L]  |  1.25    Ca    9.1      15 Dec 2024 04:42  Phos  3.7     12-15  Mg     1.60     12-15    TPro  7.2  /  Alb  3.8  /  TBili  5.4[H]  /  DBili  2.5[H]  /  AST  143[H]  /  ALT  47[H]  /  AlkPhos  186[H]  12-15          Urinalysis Basic - ( 15 Dec 2024 04:42 )    Color: x / Appearance: x / SG: x / pH: x  Gluc: 98 mg/dL / Ketone: x  / Bili: x / Urobili: x   Blood: x / Protein: x / Nitrite: x   Leuk Esterase: x / RBC: x / WBC x   Sq Epi: x / Non Sq Epi: x / Bacteria: x            RADIOLOGY & ADDITIONAL TESTS:  Imaging from Last 24 Hours:    Electrocardiogram/QTc Interval:    COORDINATION OF CARE:  Care Discussed with Consultants/Other Providers: ACP

## 2024-12-15 NOTE — DISCHARGE NOTE PROVIDER - NSDCFUADDAPPT_GEN_ALL_CORE_FT
Follow up with Dr. Jordan of [facility] in 1-2 weeks  Follow up with Dr. Valadez (infectious disease) in 1-2 weeks  Follow up with urology Dr. Currie in 1-2 weeks for left renal pelvic mass***  Follow up with hepatology (ask specifically for a liver doctor) in 1-2 weeks for elevated bilirubin level; you will need an MRI of the liver to detect iron measurements

## 2024-12-15 NOTE — PROGRESS NOTE ADULT - PROBLEM SELECTOR PLAN 1
Patient presents with L rib and back pain s/p trauma  Labs show anemia to 6.0 - patient reports is baseline is ~ 5.5-> 4.5  Retic 21%  Patient likely entering a crisis.  Pain uncontrolled with dilaudid IVP  Adust  dilaudid PCA , increased demand dose from 0.75 to 1 mg   Monitor for pain control better   Senna daily, miralax prn    Patient follows Dr Jordan of [facility] - called for consultation  Could patient benefit from transfusion for improvement of dyspnea?  History of multiple blood transfusions - takes Jadenu outpatient.  s/p  PRBC

## 2024-12-15 NOTE — DISCHARGE NOTE PROVIDER - NSDCMRMEDTOKEN_GEN_ALL_CORE_FT
allopurinol 100 mg oral tablet: 0.5 tab(s) orally once a day  folic acid 1 mg oral tablet: 1 tab(s) orally every 24 hours  Jadenu 360 mg oral tablet: 2 tab(s) orally once a day  omeprazole 20 mg oral delayed release capsule: 1 cap(s) orally once a day  oxyCODONE 30 mg oral tablet: 1 tab(s) orally every 4 hours as needed for  severe pain  polyethylene glycol 3350 oral powder for reconstitution: 17 gram(s) orally once a day  senna leaf extract oral tablet: 2 tab(s) orally once a day (at bedtime) Do not use if you develop diarrhea   allopurinol 100 mg oral tablet: 0.5 tab(s) orally once a day  ciprofloxacin 500 mg oral tablet: 1 tab(s) orally every 12 hours  Eliquis Starter Pack for Treatment of DVT and PE 5 mg oral tablet: 1 tab(s) orally 2 times a day please follow instructions ions on starter pack. you received 6 doses on the 10mg pills while in the hospital  folic acid 1 mg oral tablet: 1 tab(s) orally every 24 hours  Jadenu 360 mg oral tablet: 2 tab(s) orally once a day  NIFEdipine 30 mg oral tablet, extended release: 1 tab(s) orally once a day  omeprazole 20 mg oral delayed release capsule: 1 cap(s) orally once a day  oxyCODONE 30 mg oral tablet: 1 tab(s) orally every 4 hours as needed for  severe pain  oxyCODONE 30 mg oral tablet: 1 tab(s) orally every 4 hours As needed Severe Pain (7 - 10)  polyethylene glycol 3350 oral powder for reconstitution: 17 gram(s) orally once a day  senna leaf extract oral tablet: 2 tab(s) orally once a day (at bedtime) Do not use if you develop diarrhea  ursodiol 500 mg oral tablet: 1 tab(s) orally 2 times a day

## 2024-12-16 ENCOUNTER — RESULT REVIEW (OUTPATIENT)
Age: 44
End: 2024-12-16

## 2024-12-16 LAB
ANION GAP SERPL CALC-SCNC: 10 MMOL/L — SIGNIFICANT CHANGE UP (ref 7–14)
BUN SERPL-MCNC: 40 MG/DL — HIGH (ref 7–23)
CALCIUM SERPL-MCNC: 8.9 MG/DL — SIGNIFICANT CHANGE UP (ref 8.4–10.5)
CHLORIDE SERPL-SCNC: 104 MMOL/L — SIGNIFICANT CHANGE UP (ref 98–107)
CO2 SERPL-SCNC: 22 MMOL/L — SIGNIFICANT CHANGE UP (ref 22–31)
CREAT SERPL-MCNC: 0.99 MG/DL — SIGNIFICANT CHANGE UP (ref 0.5–1.3)
EGFR: 96 ML/MIN/1.73M2 — SIGNIFICANT CHANGE UP
GLUCOSE SERPL-MCNC: 121 MG/DL — HIGH (ref 70–99)
HCT VFR BLD CALC: 16 % — CRITICAL LOW (ref 39–50)
HGB BLD-MCNC: 5.7 G/DL — CRITICAL LOW (ref 13–17)
LDH SERPL L TO P-CCNC: 1072 U/L — HIGH (ref 135–225)
MAGNESIUM SERPL-MCNC: 1.6 MG/DL — SIGNIFICANT CHANGE UP (ref 1.6–2.6)
MCHC RBC-ENTMCNC: 29.5 PG — SIGNIFICANT CHANGE UP (ref 27–34)
MCHC RBC-ENTMCNC: 35.6 G/DL — SIGNIFICANT CHANGE UP (ref 32–36)
MCV RBC AUTO: 82.9 FL — SIGNIFICANT CHANGE UP (ref 80–100)
NRBC # BLD: 0 /100 WBCS — SIGNIFICANT CHANGE UP (ref 0–0)
NRBC # FLD: 0.06 K/UL — HIGH (ref 0–0)
PHOSPHATE SERPL-MCNC: 3.3 MG/DL — SIGNIFICANT CHANGE UP (ref 2.5–4.5)
PLATELET # BLD AUTO: 271 K/UL — SIGNIFICANT CHANGE UP (ref 150–400)
POTASSIUM SERPL-MCNC: 4.6 MMOL/L — SIGNIFICANT CHANGE UP (ref 3.5–5.3)
POTASSIUM SERPL-SCNC: 4.6 MMOL/L — SIGNIFICANT CHANGE UP (ref 3.5–5.3)
RBC # BLD: 1.93 M/UL — LOW (ref 4.2–5.8)
RBC # BLD: 1.93 M/UL — LOW (ref 4.2–5.8)
RBC # FLD: 21.2 % — HIGH (ref 10.3–14.5)
RETICS #: 191.6 K/UL — HIGH (ref 25–125)
RETICS/RBC NFR: 9.9 % — HIGH (ref 0.5–2.5)
SODIUM SERPL-SCNC: 136 MMOL/L — SIGNIFICANT CHANGE UP (ref 135–145)
WBC # BLD: 7.71 K/UL — SIGNIFICANT CHANGE UP (ref 3.8–10.5)
WBC # FLD AUTO: 7.71 K/UL — SIGNIFICANT CHANGE UP (ref 3.8–10.5)

## 2024-12-16 PROCEDURE — 93970 EXTREMITY STUDY: CPT | Mod: 26

## 2024-12-16 PROCEDURE — 99232 SBSQ HOSP IP/OBS MODERATE 35: CPT

## 2024-12-16 RX ORDER — ENOXAPARIN SODIUM 60 MG/.6ML
40 INJECTION INTRAVENOUS; SUBCUTANEOUS EVERY 24 HOURS
Refills: 0 | Status: DISCONTINUED | OUTPATIENT
Start: 2024-12-16 | End: 2024-12-31

## 2024-12-16 RX ORDER — HYDROMORPHONE HCL 4 MG
2 TABLET ORAL ONCE
Refills: 0 | Status: DISCONTINUED | OUTPATIENT
Start: 2024-12-16 | End: 2024-12-16

## 2024-12-16 RX ORDER — DIPHENHYDRAMINE HCL 25 MG
25 TABLET ORAL ONCE
Refills: 0 | Status: COMPLETED | OUTPATIENT
Start: 2024-12-16 | End: 2024-12-16

## 2024-12-16 RX ADMIN — Medication 30 MILLILITER(S): at 08:32

## 2024-12-16 RX ADMIN — ENOXAPARIN SODIUM 40 MILLIGRAM(S): 60 INJECTION INTRAVENOUS; SUBCUTANEOUS at 17:53

## 2024-12-16 RX ADMIN — Medication 1 MILLIGRAM(S): at 12:33

## 2024-12-16 RX ADMIN — Medication 25 MILLIGRAM(S): at 17:52

## 2024-12-16 RX ADMIN — SODIUM CHLORIDE 75 MILLILITER(S): 9 INJECTION, SOLUTION INTRAVENOUS at 08:48

## 2024-12-16 RX ADMIN — Medication 30 MILLILITER(S): at 19:31

## 2024-12-16 RX ADMIN — Medication 2 MILLIGRAM(S): at 23:27

## 2024-12-16 RX ADMIN — Medication 25 MILLIGRAM(S): at 05:16

## 2024-12-16 RX ADMIN — CHLORHEXIDINE GLUCONATE 1 APPLICATION(S): 1.2 RINSE ORAL at 12:33

## 2024-12-16 RX ADMIN — ALLOPURINOL 50 MILLIGRAM(S): 100 TABLET ORAL at 12:34

## 2024-12-16 RX ADMIN — Medication 1 APPLICATION(S): at 17:51

## 2024-12-16 RX ADMIN — PANTOPRAZOLE 40 MILLIGRAM(S): 40 TABLET, DELAYED RELEASE ORAL at 05:16

## 2024-12-16 RX ADMIN — Medication 25 MILLIGRAM(S): at 21:42

## 2024-12-16 RX ADMIN — Medication 40 MILLIGRAM(S): at 05:15

## 2024-12-16 RX ADMIN — Medication 1 APPLICATION(S): at 05:16

## 2024-12-16 RX ADMIN — Medication 2 MILLIGRAM(S): at 23:12

## 2024-12-16 NOTE — PROGRESS NOTE ADULT - ASSESSMENT
44 year old male with hx sickle cell, prior acute chest, coming in with dyspnea and acute pain, admitted for sickle cell pain crisis.

## 2024-12-16 NOTE — PROGRESS NOTE ADULT - SUBJECTIVE AND OBJECTIVE BOX
Patient is a 44y old  Male who [facility] a [facility] (16 Dec 2024 09:36)    SUBJECTIVE / OVERNIGHT EVENT: No acute events. Reports pain feels slightly worse today, possible from the weather. Some intermittent nausea, no vomiting. No shortness of breath. No fever or chills. No bleeding.    MEDICATIONS  (STANDING):  allopurinol 50 milliGRAM(s) Oral daily  chlorhexidine 2% Cloths 1 Application(s) Topical daily  folic acid 1 milliGRAM(s) Oral every 24 hours  furosemide    Tablet 40 milliGRAM(s) Oral daily  HYDROmorphone PCA (5 mG/mL) 30 milliLiter(s) PCA Continuous PCA Continuous  influenza   Vaccine 0.5 milliLiter(s) IntraMuscular once  metoprolol tartrate 25 milliGRAM(s) Oral two times a day  mupirocin 2% Nasal 1 Application(s) Both Nostrils two times a day  pantoprazole    Tablet 40 milliGRAM(s) Oral before breakfast  senna 2 Tablet(s) Oral at bedtime  sodium chloride 0.45%. 1000 milliLiter(s) (75 mL/Hr) IV Continuous <Continuous>    MEDICATIONS  (PRN):  naloxone Injectable 0.1 milliGRAM(s) IV Push once PRN Opioid Overdose  polyethylene glycol 3350 17 Gram(s) Oral daily PRN Constipation      CAPILLARY BLOOD GLUCOSE        I&O's Summary    15 Dec 2024 07:01  -  16 Dec 2024 07:00  --------------------------------------------------------  IN: 530 mL / OUT: 3100 mL / NET: -2570 mL    16 Dec 2024 07:01  -  16 Dec 2024 13:55  --------------------------------------------------------  IN: 0 mL / OUT: 950 mL / NET: -950 mL        PHYSICAL EXAM:  Vital Signs Last 24 Hrs  T(C): 36.6 (16 Dec 2024 12:30), Max: 36.6 (15 Dec 2024 16:32)  T(F): 97.8 (16 Dec 2024 12:30), Max: 97.9 (15 Dec 2024 16:32)  HR: 87 (16 Dec 2024 12:30) (86 - 92)  BP: 155/94 (16 Dec 2024 12:30) (135/82 - 155/94)  BP(mean): --  RR: 18 (16 Dec 2024 12:30) (18 - 18)  SpO2: 96% (16 Dec 2024 12:30) (93% - 99%)    Parameters below as of 16 Dec 2024 12:30  Patient On (Oxygen Delivery Method): room air    CONSTITUTIONAL: NAD, sitting up in bed  ENMT: Moist oral mucosa  RESPIRATORY: Normal respiratory effort; lungs are clear to auscultation bilaterally  CARDIOVASCULAR: Regular rate and rhythm, normal S1 and S2, No lower extremity edema  ABDOMEN: Nontender to palpation, normoactive bowel sounds, no rebound/guarding  PSYCH: calm    LABS:                        5.7    7.71  )-----------( 271      ( 16 Dec 2024 05:10 )             16.0     12-16    136  |  104  |  40[H]  ----------------------------<  121[H]  4.6   |  22  |  0.99    Ca    8.9      16 Dec 2024 05:10  Phos  3.3     12-16  Mg     1.60     12-16    TPro  7.2  /  Alb  3.8  /  TBili  5.4[H]  /  DBili  2.5[H]  /  AST  143[H]  /  ALT  47[H]  /  AlkPhos  186[H]  12-15    Urinalysis Basic - ( 16 Dec 2024 05:10 )    Color: x / Appearance: x / SG: x / pH: x  Gluc: 121 mg/dL / Ketone: x  / Bili: x / Urobili: x   Blood: x / Protein: x / Nitrite: x   Leuk Esterase: x / RBC: x / WBC x   Sq Epi: x / Non Sq Epi: x / Bacteria: x    RADIOLOGY & ADDITIONAL TESTS: Reviewed    COORDINATION OF CARE:  Care Discussed with Consultants/Other Providers [Y- Medicine ACP]

## 2024-12-16 NOTE — CHART NOTE - NSCHARTNOTEFT_GEN_A_CORE
Notified by RN patient with extreme restlessness and repetitive yawning. Patient seen at bedside, standing and pacing in his room. States that he wants to go to bed but "cannot stop moving". Patient is A&O x 4 (person, place, time, situation) however has frequent yawns during conversation. Patient states he is experiencing high amount of anxiety. Patient denies chest pain, sob, fever, chills or HA. Per RN staff, patient is doing laps around the 7N unit and unable to stay still; looks uncomfortable. Vital signs stable at this time.     Per interview with patient -admits to taking Oxycodone 30 mg at home. States he takes around 6 pills a day normally. Denies illicit drug use at home. High suspicion for opoid withdrawal. Patient with COWS score + yawning, +restlessness, +pupils dilated, + tearing of the eyes, + Body aches.   Spoke with Dr. Valentino the [facility] this evening - recommends giving additional Dilaudid 2mg IVP x 1 dose this evening. Will reassess patient. Will continue with PCA pump as ordered per attending.     Vital Signs Last 24 Hrs  T(C): 36.4 (16 Dec 2024 21:45), Max: 37.2 (16 Dec 2024 17:50)  T(F): 97.5 (16 Dec 2024 21:45), Max: 98.9 (16 Dec 2024 17:50)  HR: 83 (16 Dec 2024 21:45) (80 - 93)  BP: 162/90 (16 Dec 2024 21:45) (140/89 - 162/90)  BP(mean): --  RR: 18 (16 Dec 2024 21:45) (16 - 18)  SpO2: 93% (16 Dec 2024 21:45) (93% - 97%)    Parameters below as of 16 Dec 2024 21:45  Patient On (Oxygen Delivery Method): room air    KENAN Jones  Department of Medicine   In House # [id number] Notified by RN patient with extreme restlessness and repetitive yawning. Patient seen at bedside, standing and pacing in his room. States that he wants to go to bed but "cannot stop moving". Patient is A&O x 4 (person, place, time, situation) however has frequent yawns and eyes are actively tearing during conversation. Patient states he is experiencing high amount of anxiety. Patient denies chest pain, sob, fever, chills or HA. Per RN staff, patient is doing laps around the 7N unit and unable to stay still; looks uncomfortable. Vital signs stable at this time.     Per interview with patient - admits to taking Oxycodone 30 mg at home. States he takes around 6 pills a day normally. Denies illicit drug use at home. High suspicion for opoid withdrawal. Patient with COWS score + yawning, +restlessness, +pupils dilated, + tearing of the eyes, + Body aches, +diarrhea.   Spoke with Dr. Valentino the [facility] recommends giving additional Dilaudid 2mg IVP x 1 dose this evening. Will reassess patient. Will continue with PCA pump as ordered per attending.     Vital Signs Last 24 Hrs  T(C): 36.4 (16 Dec 2024 21:45), Max: 37.2 (16 Dec 2024 17:50)  T(F): 97.5 (16 Dec 2024 21:45), Max: 98.9 (16 Dec 2024 17:50)  HR: 83 (16 Dec 2024 21:45) (80 - 93)  BP: 162/90 (16 Dec 2024 21:45) (140/89 - 162/90)  BP(mean): --  RR: 18 (16 Dec 2024 21:45) (16 - 18)  SpO2: 93% (16 Dec 2024 21:45) (93% - 97%)    Constitutional: patient is sitting up on the edge of the bed shaking leg and yawning   Neck: supple, no JVD  Respiratory: Clear to auscultation bilaterally. No wheezes, rales or rhonchi. Normal respiratory effort  Cardiovascular: regular rate and rhythm, S1 and S2, no murmurs, rubs or gallops, no edema, 2+ peripheral pulses  Gastrointestinal: soft, nontender, nondistended, +bowel sounds, no hernia  Skin: warm, dry, no rash  Neurologic: sensation grossly intact, CN grossly intact, non-focal exam, A&O x 4   Musculoskeletal: no clubbing, no cyanosis, no joint swelling    _______________________________________________________________________________________  Addendum:    Notified by RN again this am patient is restless once again. Cannot sit still, pacing in his room. Patient states he is uncomfortable and anxious at this time. Patient remains to be yawning continuously. Patient with good response to Dilaudid 2mg IVP x 1 dose earlier in the night per RN. Spoke with Hospitalist Dr. Eder Ochoa - recommending repeat Dilaudid 2mg IVP x 1 dose this morning in addition to PCA pump. Infectious workup ordered in addition to AM heme labs. Vitals q 4 hours and .   Will continue to monitor.     KENAN Jones  Department of Medicine   In House # [id number] Notified by RN patient with extreme restlessness and repetitive yawning. Patient seen at bedside, standing and pacing in his room. States that he wants to go to bed but "cannot stop moving". Patient is A&O x 4 (person, place, time, situation) however has frequent yawns and eyes are actively tearing during conversation. Patient states he is experiencing high amount of anxiety. Patient denies chest pain, sob, fever, chills or HA. Per RN staff, patient is doing laps around the 7N unit and unable to stay still; looks uncomfortable. Vital signs stable at this time.     Per interview with patient - admits to taking Oxycodone 30 mg at home. States he takes around 6 pills a day normally. Denies illicit drug use at home. High suspicion for opoid withdrawal. Patient with COWS score + yawning, +restlessness, +pupils dilated, + tearing of the eyes, + Body aches, +diarrhea.   Spoke with Dr. Valentino the [facility] recommends giving additional Dilaudid 2mg IVP x 1 dose this evening. Will reassess patient. Will continue with PCA pump as ordered per attending.     Vital Signs Last 24 Hrs  T(C): 36.4 (16 Dec 2024 21:45), Max: 37.2 (16 Dec 2024 17:50)  T(F): 97.5 (16 Dec 2024 21:45), Max: 98.9 (16 Dec 2024 17:50)  HR: 83 (16 Dec 2024 21:45) (80 - 93)  BP: 162/90 (16 Dec 2024 21:45) (140/89 - 162/90)  BP(mean): --  RR: 18 (16 Dec 2024 21:45) (16 - 18)  SpO2: 93% (16 Dec 2024 21:45) (93% - 97%)    Constitutional: patient is sitting up on the edge of the bed shaking leg and yawning   Neck: supple, no JVD  Respiratory: Clear to auscultation bilaterally. No wheezes, rales or rhonchi. Normal respiratory effort  Cardiovascular: regular rate and rhythm, S1 and S2, no murmurs, rubs or gallops, no edema, 2+ peripheral pulses  Gastrointestinal: soft, nontender, nondistended, +bowel sounds, no hernia  Skin: warm, dry, no rash  Neurologic: sensation grossly intact, CN grossly intact, non-focal exam, A&O x 4   Musculoskeletal: no clubbing, no cyanosis, no joint swelling    __________________________________________________  Addendum:    Notified by RN again this am patient is restless once again. Cannot sit still, pacing in his room. Patient states he is uncomfortable and anxious at this time. Patient remains to be yawning continuously. Patient with good response to Dilaudid 2mg IVP x 1 dose earlier in the night per RN. Spoke with Hospitalist Dr. Eder Ochoa - recommending repeat Dilaudid 2mg IVP x 1 dose this morning in addition to PCA pump. AM heme labs pending collection. Vitals q 4 hours and .   Will continue to monitor.     KENAN Jones  Department of Medicine   In House # [id number]

## 2024-12-16 NOTE — PROGRESS NOTE ADULT - PROBLEM SELECTOR PLAN 2
Per ED notes - patient hypoxic in the 80s on RA. Now on RA. Suspect 2/2 rib pain with breathing now improving. . CTA chest shows no infiltrates, negative for PE.    Breathing improved, monitor

## 2024-12-16 NOTE — PROGRESS NOTE ADULT - SUBJECTIVE AND OBJECTIVE BOX
Patient is a 44y old  Male who presents with a chief complaint of mech fall, pain, dyspnea (15 Dec 2024 15:21)    Patient examined today.  Hgb decreased to 5.7.   No bleeding.    MEDICATIONS  (STANDING):  allopurinol 50 milliGRAM(s) Oral daily  chlorhexidine 2% Cloths 1 Application(s) Topical daily  folic acid 1 milliGRAM(s) Oral every 24 hours  furosemide    Tablet 40 milliGRAM(s) Oral daily  HYDROmorphone PCA (5 mG/mL) 30 milliLiter(s) PCA Continuous PCA Continuous  influenza   Vaccine 0.5 milliLiter(s) IntraMuscular once  metoprolol tartrate 25 milliGRAM(s) Oral two times a day  mupirocin 2% Nasal 1 Application(s) Both Nostrils two times a day  pantoprazole    Tablet 40 milliGRAM(s) Oral before breakfast  senna 2 Tablet(s) Oral at bedtime  sodium chloride 0.45%. 1000 milliLiter(s) (75 mL/Hr) IV Continuous <Continuous>    MEDICATIONS  (PRN):  naloxone Injectable 0.1 milliGRAM(s) IV Push once PRN Opioid Overdose  polyethylene glycol 3350 17 Gram(s) Oral daily PRN Constipation      ROS  No fever, sweats, chills  No epistaxis, HA, sore throat  No CP, SOB, cough, sputum  No bleeding, bruising  No dysuria, hematuria    Vital Signs Last 24 Hrs  T(C): 36.6 (16 Dec 2024 08:30), Max: 36.6 (15 Dec 2024 12:30)  T(F): 97.9 (16 Dec 2024 08:30), Max: 97.9 (15 Dec 2024 16:32)  HR: 88 (16 Dec 2024 08:30) (81 - 92)  BP: 147/90 (16 Dec 2024 08:30) (135/82 - 151/90)  BP(mean): --  RR: 18 (16 Dec 2024 08:30) (18 - 18)  SpO2: 96% (16 Dec 2024 08:30) (93% - 99%)    Parameters below as of 16 Dec 2024 08:30  Patient On (Oxygen Delivery Method): room air        PE  NAD  Awake, alert  Anicteric, MMM  RRR                            5.7    7.71  )-----------( 271      ( 16 Dec 2024 05:10 )             16.0       12-16    136  |  104  |  40[H]  ----------------------------<  121[H]  4.6   |  22  |  0.99    Ca    8.9      16 Dec 2024 05:10  Phos  3.3     12-16  Mg     1.60     12-16    TPro  7.2  /  Alb  3.8  /  TBili  5.4[H]  /  DBili  2.5[H]  /  AST  143[H]  /  ALT  47[H]  /  AlkPhos  186[H]  12-15       Patient is a 44y old  Male who presents with a chief complaint of mech fall, pain, dyspnea (15 Dec 2024 15:21)    Patient examined today.  Hgb decreased to 5.7.   No bleeding.  Pain is better controlled per pt.     MEDICATIONS  (STANDING):  allopurinol 50 milliGRAM(s) Oral daily  chlorhexidine 2% Cloths 1 Application(s) Topical daily  folic acid 1 milliGRAM(s) Oral every 24 hours  furosemide    Tablet 40 milliGRAM(s) Oral daily  HYDROmorphone PCA (5 mG/mL) 30 milliLiter(s) PCA Continuous PCA Continuous  influenza   Vaccine 0.5 milliLiter(s) IntraMuscular once  metoprolol tartrate 25 milliGRAM(s) Oral two times a day  mupirocin 2% Nasal 1 Application(s) Both Nostrils two times a day  pantoprazole    Tablet 40 milliGRAM(s) Oral before breakfast  senna 2 Tablet(s) Oral at bedtime  sodium chloride 0.45%. 1000 milliLiter(s) (75 mL/Hr) IV Continuous <Continuous>    MEDICATIONS  (PRN):  naloxone Injectable 0.1 milliGRAM(s) IV Push once PRN Opioid Overdose  polyethylene glycol 3350 17 Gram(s) Oral daily PRN Constipation      ROS  No fever, sweats, chills  No epistaxis, HA, sore throat  No CP, SOB, cough, sputum  No bleeding, bruising  No dysuria, hematuria    Vital Signs Last 24 Hrs  T(C): 36.6 (16 Dec 2024 08:30), Max: 36.6 (15 Dec 2024 12:30)  T(F): 97.9 (16 Dec 2024 08:30), Max: 97.9 (15 Dec 2024 16:32)  HR: 88 (16 Dec 2024 08:30) (81 - 92)  BP: 147/90 (16 Dec 2024 08:30) (135/82 - 151/90)  BP(mean): --  RR: 18 (16 Dec 2024 08:30) (18 - 18)  SpO2: 96% (16 Dec 2024 08:30) (93% - 99%)    Parameters below as of 16 Dec 2024 08:30  Patient On (Oxygen Delivery Method): room air        PE  NAD  Awake, alert  Anicteric, MMM                          5.7    7.71  )-----------( 271      ( 16 Dec 2024 05:10 )             16.0       12-16    136  |  104  |  40[H]  ----------------------------<  121[H]  4.6   |  22  |  0.99    Ca    8.9      16 Dec 2024 05:10  Phos  3.3     12-16  Mg     1.60     12-16    TPro  7.2  /  Alb  3.8  /  TBili  5.4[H]  /  DBili  2.5[H]  /  AST  143[H]  /  ALT  47[H]  /  AlkPhos  186[H]  12-15

## 2024-12-16 NOTE — PROGRESS NOTE ADULT - PROBLEM SELECTOR PLAN 7
DVT prop: SCDs - follow up with heme regarding starting hep subq  low Na diet DVT prop: lovenox. Elevated risk of DVT given SCC. No signs of bleeding.   low Na diet

## 2024-12-16 NOTE — PROGRESS NOTE ADULT - PROBLEM SELECTOR PLAN 1
Patient presents with L rib and back pain s/p trauma  Patient reports hg baseline is ~ 5.5  - C/w dilaudid PCA, monitor pain, wean PCA as able  - Bowel regimen with Senna daily, miralax prn  -- Patient follows Dr Jordan of [facility] - appreciate hem/onc input here, s/p 1 unit pRBC this admission. Per heme, hold off on further transfusion unless hg<5.5.   - Pt on Jadenu at home, f/u Heme onc if to be given while inpatient  - no signs of acute chest- addressed below

## 2024-12-16 NOTE — PROGRESS NOTE ADULT - ASSESSMENT
44 year old male with history of sickle cell anemia presenting today s/p mechanical fall    Sickle cell disease  - follows with Dr Jordan of [facility]  - Hgb 4.9 on 12/13, now s/p 1u PRBCs. --> Hgb 6.5  - chronic hemolysis, Iron overload  - pls limit transfusions, maintain Hgb 5.5  - Continue Jadenu, maximize pain control  - ongoing care after discharge  - Hgb today (12/16/2024) decreased to 5.7.  - will hold off on prbc unless Hgb <5.5.   - monitor closely.    Fall  -s/p mechanical fall, CTH negative  -admitted with rib pain and SOB on ambulation  -CTA without pulmonary embolism  -Defer pain management to medicine team    will continue to follow    Ramona Pedroza PA-C  Hematology/Oncology  [facility]  [phone] (office)   Evenings and weekends please call MD on call or office     44 year old male with history of sickle cell anemia presenting today s/p mechanical fall    Sickle cell disease  - follows with Dr Jordan of [facility]  - Hgb 4.9 on 12/13, now s/p 1u PRBCs. --> Hgb 6.5  - chronic hemolysis, Iron overload  - pls limit transfusions, maintain Hgb 5.5  - Continue Jadenu, maximize pain control  - ongoing care after discharge  - Hgb today (12/16/2024) decreased to 5.7.  - will hold off on prbc unless Hgb <5.5.   - pain improving.   - monitor closely.    Fall  -s/p mechanical fall, CTH negative  -admitted with rib pain and SOB on ambulation  -CTA without pulmonary embolism  -Defer pain management to medicine team    will continue to follow    Ramona Pedroza PA-C  Hematology/Oncology  [facility]  [phone] (office)   Evenings and weekends please call MD on call or office

## 2024-12-16 NOTE — PROGRESS NOTE ADULT - NS ATTEND AMEND GEN_ALL_CORE FT
Continue IVF and pain control  Appears comfortable on exam although reports some ongoing pain, feels it is better than yesterday  Would advise incentive spirometer use

## 2024-12-17 LAB
ALBUMIN SERPL ELPH-MCNC: 3.8 G/DL — SIGNIFICANT CHANGE UP (ref 3.3–5)
ALP SERPL-CCNC: 166 U/L — HIGH (ref 40–120)
ALT FLD-CCNC: 57 U/L — HIGH (ref 4–41)
AMPHET UR-MCNC: NEGATIVE — SIGNIFICANT CHANGE UP
ANION GAP SERPL CALC-SCNC: 12 MMOL/L — SIGNIFICANT CHANGE UP (ref 7–14)
ANION GAP SERPL CALC-SCNC: 14 MMOL/L — SIGNIFICANT CHANGE UP (ref 7–14)
AST SERPL-CCNC: 159 U/L — HIGH (ref 4–40)
BARBITURATES UR SCN-MCNC: NEGATIVE — SIGNIFICANT CHANGE UP
BENZODIAZ UR-MCNC: NEGATIVE — SIGNIFICANT CHANGE UP
BILIRUB SERPL-MCNC: 6.2 MG/DL — HIGH (ref 0.2–1.2)
BUN SERPL-MCNC: 32 MG/DL — HIGH (ref 7–23)
BUN SERPL-MCNC: 36 MG/DL — HIGH (ref 7–23)
CALCIUM SERPL-MCNC: 9.1 MG/DL — SIGNIFICANT CHANGE UP (ref 8.4–10.5)
CALCIUM SERPL-MCNC: 9.2 MG/DL — SIGNIFICANT CHANGE UP (ref 8.4–10.5)
CHLORIDE SERPL-SCNC: 101 MMOL/L — SIGNIFICANT CHANGE UP (ref 98–107)
CHLORIDE SERPL-SCNC: 101 MMOL/L — SIGNIFICANT CHANGE UP (ref 98–107)
CO2 SERPL-SCNC: 21 MMOL/L — LOW (ref 22–31)
CO2 SERPL-SCNC: 24 MMOL/L — SIGNIFICANT CHANGE UP (ref 22–31)
COCAINE METAB.OTHER UR-MCNC: NEGATIVE — SIGNIFICANT CHANGE UP
CREAT SERPL-MCNC: 1 MG/DL — SIGNIFICANT CHANGE UP (ref 0.5–1.3)
CREAT SERPL-MCNC: 1.38 MG/DL — HIGH (ref 0.5–1.3)
CREATININE URINE RESULT, DAU: 14 MG/DL — SIGNIFICANT CHANGE UP
EGFR: 64 ML/MIN/1.73M2 — SIGNIFICANT CHANGE UP
EGFR: 95 ML/MIN/1.73M2 — SIGNIFICANT CHANGE UP
FENTANYL UR QL SCN: NEGATIVE — SIGNIFICANT CHANGE UP
GLUCOSE SERPL-MCNC: 110 MG/DL — HIGH (ref 70–99)
GLUCOSE SERPL-MCNC: 132 MG/DL — HIGH (ref 70–99)
HCT VFR BLD CALC: 16.7 % — CRITICAL LOW (ref 39–50)
HCT VFR BLD CALC: 17 % — CRITICAL LOW (ref 39–50)
HGB BLD-MCNC: 6 G/DL — CRITICAL LOW (ref 13–17)
HGB BLD-MCNC: 6.1 G/DL — CRITICAL LOW (ref 13–17)
LACTATE SERPL-SCNC: 1.4 MMOL/L — SIGNIFICANT CHANGE UP (ref 0.5–2)
LDH SERPL L TO P-CCNC: 1143 U/L — HIGH (ref 135–225)
MAGNESIUM SERPL-MCNC: 1.4 MG/DL — LOW (ref 1.6–2.6)
MCHC RBC-ENTMCNC: 29.7 PG — SIGNIFICANT CHANGE UP (ref 27–34)
MCHC RBC-ENTMCNC: 29.8 PG — SIGNIFICANT CHANGE UP (ref 27–34)
MCHC RBC-ENTMCNC: 35.9 G/DL — SIGNIFICANT CHANGE UP (ref 32–36)
MCHC RBC-ENTMCNC: 35.9 G/DL — SIGNIFICANT CHANGE UP (ref 32–36)
MCV RBC AUTO: 82.7 FL — SIGNIFICANT CHANGE UP (ref 80–100)
MCV RBC AUTO: 82.9 FL — SIGNIFICANT CHANGE UP (ref 80–100)
METHADONE UR-MCNC: NEGATIVE — SIGNIFICANT CHANGE UP
NRBC # BLD: 0 /100 WBCS — SIGNIFICANT CHANGE UP (ref 0–0)
NRBC # BLD: 0 /100 WBCS — SIGNIFICANT CHANGE UP (ref 0–0)
NRBC # FLD: 0.04 K/UL — HIGH (ref 0–0)
NRBC # FLD: 0.05 K/UL — HIGH (ref 0–0)
OPIATES UR-MCNC: NEGATIVE — SIGNIFICANT CHANGE UP
OXYCODONE UR-MCNC: POSITIVE
PCP SPEC-MCNC: SIGNIFICANT CHANGE UP
PCP UR-MCNC: NEGATIVE — SIGNIFICANT CHANGE UP
PHOSPHATE SERPL-MCNC: 2.4 MG/DL — LOW (ref 2.5–4.5)
PLATELET # BLD AUTO: 301 K/UL — SIGNIFICANT CHANGE UP (ref 150–400)
PLATELET # BLD AUTO: 318 K/UL — SIGNIFICANT CHANGE UP (ref 150–400)
POTASSIUM SERPL-MCNC: 3.9 MMOL/L — SIGNIFICANT CHANGE UP (ref 3.5–5.3)
POTASSIUM SERPL-MCNC: 4.3 MMOL/L — SIGNIFICANT CHANGE UP (ref 3.5–5.3)
POTASSIUM SERPL-SCNC: 3.9 MMOL/L — SIGNIFICANT CHANGE UP (ref 3.5–5.3)
POTASSIUM SERPL-SCNC: 4.3 MMOL/L — SIGNIFICANT CHANGE UP (ref 3.5–5.3)
PROCALCITONIN SERPL-MCNC: 0.14 NG/ML — HIGH (ref 0.02–0.1)
PROT SERPL-MCNC: 7.1 G/DL — SIGNIFICANT CHANGE UP (ref 6–8.3)
RBC # BLD: 2.02 M/UL — LOW (ref 4.2–5.8)
RBC # BLD: 2.05 M/UL — LOW (ref 4.2–5.8)
RBC # BLD: 2.05 M/UL — LOW (ref 4.2–5.8)
RBC # FLD: 21.2 % — HIGH (ref 10.3–14.5)
RBC # FLD: 21.4 % — HIGH (ref 10.3–14.5)
RETICS #: 181.5 K/UL — HIGH (ref 25–125)
RETICS/RBC NFR: 8.8 % — HIGH (ref 0.5–2.5)
SODIUM SERPL-SCNC: 136 MMOL/L — SIGNIFICANT CHANGE UP (ref 135–145)
SODIUM SERPL-SCNC: 137 MMOL/L — SIGNIFICANT CHANGE UP (ref 135–145)
THC UR QL: POSITIVE
WBC # BLD: 12.24 K/UL — HIGH (ref 3.8–10.5)
WBC # BLD: 7.63 K/UL — SIGNIFICANT CHANGE UP (ref 3.8–10.5)
WBC # FLD AUTO: 12.24 K/UL — HIGH (ref 3.8–10.5)
WBC # FLD AUTO: 7.63 K/UL — SIGNIFICANT CHANGE UP (ref 3.8–10.5)

## 2024-12-17 PROCEDURE — 99232 SBSQ HOSP IP/OBS MODERATE 35: CPT

## 2024-12-17 PROCEDURE — 71045 X-RAY EXAM CHEST 1 VIEW: CPT | Mod: 26

## 2024-12-17 RX ORDER — HYDROMORPHONE HCL 4 MG
30 TABLET ORAL
Refills: 0 | Status: DISCONTINUED | OUTPATIENT
Start: 2024-12-17 | End: 2024-12-20

## 2024-12-17 RX ORDER — LORAZEPAM 1 MG/1
0.25 TABLET ORAL ONCE
Refills: 0 | Status: DISCONTINUED | OUTPATIENT
Start: 2024-12-17 | End: 2024-12-17

## 2024-12-17 RX ORDER — MAGNESIUM SULFATE 500 MG/ML
1 INJECTION, SOLUTION INTRAMUSCULAR; INTRAVENOUS ONCE
Refills: 0 | Status: COMPLETED | OUTPATIENT
Start: 2024-12-17 | End: 2024-12-17

## 2024-12-17 RX ORDER — KETOROLAC TROMETHAMINE 30 MG/ML
30 INJECTION INTRAMUSCULAR; INTRAVENOUS ONCE
Refills: 0 | Status: DISCONTINUED | OUTPATIENT
Start: 2024-12-17 | End: 2024-12-17

## 2024-12-17 RX ORDER — SOD PHOS DI, MONO/K PHOS MONO 250 MG
1 TABLET ORAL ONCE
Refills: 0 | Status: COMPLETED | OUTPATIENT
Start: 2024-12-17 | End: 2024-12-17

## 2024-12-17 RX ORDER — ONDANSETRON 4 MG/1
4 TABLET ORAL ONCE
Refills: 0 | Status: COMPLETED | OUTPATIENT
Start: 2024-12-17 | End: 2024-12-17

## 2024-12-17 RX ORDER — NIFEDIPINE 60 MG/1
30 TABLET, EXTENDED RELEASE ORAL DAILY
Refills: 0 | Status: DISCONTINUED | OUTPATIENT
Start: 2024-12-17 | End: 2025-01-03

## 2024-12-17 RX ORDER — ONDANSETRON 4 MG/1
4 TABLET ORAL ONCE
Refills: 0 | Status: DISCONTINUED | OUTPATIENT
Start: 2024-12-17 | End: 2024-12-17

## 2024-12-17 RX ORDER — ACETAMINOPHEN 80 MG/.8ML
1000 SOLUTION/ DROPS ORAL ONCE
Refills: 0 | Status: DISCONTINUED | OUTPATIENT
Start: 2024-12-17 | End: 2024-12-17

## 2024-12-17 RX ADMIN — ONDANSETRON 4 MILLIGRAM(S): 4 TABLET ORAL at 22:58

## 2024-12-17 RX ADMIN — LORAZEPAM 0.25 MILLIGRAM(S): 1 TABLET ORAL at 10:21

## 2024-12-17 RX ADMIN — Medication 40 MILLIGRAM(S): at 05:08

## 2024-12-17 RX ADMIN — SODIUM CHLORIDE 75 MILLILITER(S): 9 INJECTION, SOLUTION INTRAVENOUS at 22:53

## 2024-12-17 RX ADMIN — NIFEDIPINE 30 MILLIGRAM(S): 60 TABLET, EXTENDED RELEASE ORAL at 18:26

## 2024-12-17 RX ADMIN — Medication 30 MILLILITER(S): at 09:09

## 2024-12-17 RX ADMIN — Medication 30 MILLILITER(S): at 10:15

## 2024-12-17 RX ADMIN — Medication 1 PACKET(S): at 12:14

## 2024-12-17 RX ADMIN — CHLORHEXIDINE GLUCONATE 1 APPLICATION(S): 1.2 RINSE ORAL at 12:12

## 2024-12-17 RX ADMIN — Medication 30 MILLILITER(S): at 20:25

## 2024-12-17 RX ADMIN — ALLOPURINOL 50 MILLIGRAM(S): 100 TABLET ORAL at 12:11

## 2024-12-17 RX ADMIN — Medication 1 APPLICATION(S): at 18:26

## 2024-12-17 RX ADMIN — PANTOPRAZOLE 40 MILLIGRAM(S): 40 TABLET, DELAYED RELEASE ORAL at 05:07

## 2024-12-17 RX ADMIN — ENOXAPARIN SODIUM 40 MILLIGRAM(S): 60 INJECTION INTRAVENOUS; SUBCUTANEOUS at 18:26

## 2024-12-17 RX ADMIN — MAGNESIUM SULFATE 100 GRAM(S): 500 INJECTION, SOLUTION INTRAMUSCULAR; INTRAVENOUS at 12:14

## 2024-12-17 RX ADMIN — KETOROLAC TROMETHAMINE 30 MILLIGRAM(S): 30 INJECTION INTRAMUSCULAR; INTRAVENOUS at 23:55

## 2024-12-17 RX ADMIN — KETOROLAC TROMETHAMINE 30 MILLIGRAM(S): 30 INJECTION INTRAMUSCULAR; INTRAVENOUS at 22:55

## 2024-12-17 RX ADMIN — Medication 1 MILLIGRAM(S): at 12:11

## 2024-12-17 RX ADMIN — Medication 1 APPLICATION(S): at 05:07

## 2024-12-17 RX ADMIN — Medication 25 MILLIGRAM(S): at 05:07

## 2024-12-17 NOTE — PROGRESS NOTE ADULT - SUBJECTIVE AND OBJECTIVE BOX
Patient is a 44y old  Male who presents with a chief complaint of [facility] fall, pain, dyspnea (17 Dec 2024 13:00)      SUBJECTIVE / OVERNIGHT EVENTS:  ADDITIONAL REVIEW OF SYSTEMS:    MEDICATIONS  (STANDING):  allopurinol 50 milliGRAM(s) Oral daily  chlorhexidine 2% Cloths 1 Application(s) Topical daily  enoxaparin Injectable 40 milliGRAM(s) SubCutaneous every 24 hours  folic acid 1 milliGRAM(s) Oral every 24 hours  furosemide    Tablet 40 milliGRAM(s) Oral daily  HYDROmorphone PCA (5 mG/mL) 30 milliLiter(s) PCA Continuous PCA Continuous  influenza   Vaccine 0.5 milliLiter(s) IntraMuscular once  metoprolol tartrate 25 milliGRAM(s) Oral two times a day  mupirocin 2% Nasal 1 Application(s) Both Nostrils two times a day  pantoprazole    Tablet 40 milliGRAM(s) Oral before breakfast  sodium chloride 0.45%. 1000 milliLiter(s) (75 mL/Hr) IV Continuous <Continuous>    MEDICATIONS  (PRN):  naloxone Injectable 0.1 milliGRAM(s) IV Push once PRN Opioid Overdose      CAPILLARY BLOOD GLUCOSE        I&O's Summary    16 Dec 2024 07:01  -  17 Dec 2024 07:00  --------------------------------------------------------  IN: 845 mL / OUT: 1750 mL / NET: -905 mL        PHYSICAL EXAM:  Vital Signs Last 24 Hrs  T(C): 36.6 (17 Dec 2024 09:30), Max: 37.2 (16 Dec 2024 17:50)  T(F): 97.9 (17 Dec 2024 09:30), Max: 98.9 (16 Dec 2024 17:50)  HR: 86 (17 Dec 2024 09:30) (78 - 93)  BP: 159/101 (17 Dec 2024 09:30) (140/89 - 172/83)  BP(mean): --  RR: 19 (17 Dec 2024 09:30) (16 - 19)  SpO2: 100% (17 Dec 2024 09:30) (93% - 100%)    Parameters below as of 17 Dec 2024 09:30  Patient On (Oxygen Delivery Method): room air          LABS:                        6.1    7.63  )-----------( 301      ( 17 Dec 2024 10:15 )             17.0     12-17    137  |  101  |  32[H]  ----------------------------<  110[H]  3.9   |  24  |  1.00    Ca    9.2      17 Dec 2024 10:15  Phos  2.4     12-17  Mg     1.40     12-17            Urinalysis Basic - ( 17 Dec 2024 10:15 )    Color: x / Appearance: x / SG: x / pH: x  Gluc: 110 mg/dL / Ketone: x  / Bili: x / Urobili: x   Blood: x / Protein: x / Nitrite: x   Leuk Esterase: x / RBC: x / WBC x   Sq Epi: x / Non Sq Epi: x / Bacteria: x          RADIOLOGY & ADDITIONAL TESTS:  Results Reviewed:   Imaging Personally Reviewed:  Electrocardiogram Personally Reviewed:    COORDINATION OF CARE:  Care Discussed with Consultants/Other Providers [Y/N]:  Prior or Outpatient Records Reviewed [Y/N]:   Patient is a 44y old  Male who [facility] a [facility] (17 Dec 2024 13:00)    SUBJECTIVE / OVERNIGHT EVENTS: Last night with restlessness, anxiety, was given dilaudid 2mg IV x 1 with improvement of symptoms. Patient states subsequently he was able to get some sleep. This morning, after waking around 7am, he reported increased back pain consistent with sickle cell crisis along with increased restlessness. No nausea, vomiting, tremors.    MEDICATIONS  (STANDING):  allopurinol 50 milliGRAM(s) Oral daily  chlorhexidine 2% Cloths 1 Application(s) Topical daily  enoxaparin Injectable 40 milliGRAM(s) SubCutaneous every 24 hours  folic acid 1 milliGRAM(s) Oral every 24 hours  furosemide    Tablet 40 milliGRAM(s) Oral daily  HYDROmorphone PCA (5 mG/mL) 30 milliLiter(s) PCA Continuous PCA Continuous  influenza   Vaccine 0.5 milliLiter(s) IntraMuscular once  metoprolol tartrate 25 milliGRAM(s) Oral two times a day  mupirocin 2% Nasal 1 Application(s) Both Nostrils two times a day  pantoprazole    Tablet 40 milliGRAM(s) Oral before breakfast  sodium chloride 0.45%. 1000 milliLiter(s) (75 mL/Hr) IV Continuous <Continuous>    MEDICATIONS  (PRN):  naloxone Injectable 0.1 milliGRAM(s) IV Push once PRN Opioid Overdose      CAPILLARY BLOOD GLUCOSE        I&O's Summary    16 Dec 2024 07:01  -  17 Dec 2024 07:00  --------------------------------------------------------  IN: 845 mL / OUT: 1750 mL / NET: -905 mL        PHYSICAL EXAM:  Vital Signs Last 24 Hrs  T(C): 36.6 (17 Dec 2024 09:30), Max: 37.2 (16 Dec 2024 17:50)  T(F): 97.9 (17 Dec 2024 09:30), Max: 98.9 (16 Dec 2024 17:50)  HR: 86 (17 Dec 2024 09:30) (78 - 93)  BP: 159/101 (17 Dec 2024 09:30) (140/89 - 172/83)  BP(mean): --  RR: 19 (17 Dec 2024 09:30) (16 - 19)  SpO2: 100% (17 Dec 2024 09:30) (93% - 100%)    Parameters below as of 17 Dec 2024 09:30  Patient On (Oxygen Delivery Method): room air    CONSTITUTIONAL: NAD, sitting up in bed, appearing mildly anxious/restless  ENMT: Moist oral mucosa  RESPIRATORY: Normal respiratory effort; lungs are clear to auscultation bilaterally  CARDIOVASCULAR: Regular rate and rhythm, normal S1 and S2, No lower extremity edema  ABDOMEN: Nontender to palpation, normoactive bowel sounds, no rebound/guarding  PSYCH: calm    LABS:                        6.1    7.63  )-----------( 301      ( 17 Dec 2024 10:15 )             17.0     12-17    137  |  101  |  32[H]  ----------------------------<  110[H]  3.9   |  24  |  1.00    Ca    9.2      17 Dec 2024 10:15  Phos  2.4     12-17  Mg     1.40     12-17      Urinalysis Basic - ( 17 Dec 2024 10:15 )    Color: x / Appearance: x / SG: x / pH: x  Gluc: 110 mg/dL / Ketone: x  / Bili: x / Urobili: x   Blood: x / Protein: x / Nitrite: x   Leuk Esterase: x / RBC: x / WBC x   Sq Epi: x / Non Sq Epi: x / Bacteria: x      RADIOLOGY & ADDITIONAL TESTS: Reviewed    COORDINATION OF CARE:  Care Discussed with Consultants/Other Providers [Y- Medicine ACP, CM, SW, RN]

## 2024-12-17 NOTE — PROGRESS NOTE ADULT - SUBJECTIVE AND OBJECTIVE BOX
Patient is a 44y old  Male who presents with a chief complaint of [facility] fall, pain, dyspnea (16 Dec 2024 13:54)    Patient examined at bedside today.  No acute events overnight.  Reports back pain, on PCA pump.     MEDICATIONS  (STANDING):  allopurinol 50 milliGRAM(s) Oral daily  chlorhexidine 2% Cloths 1 Application(s) Topical daily  enoxaparin Injectable 40 milliGRAM(s) SubCutaneous every 24 hours  folic acid 1 milliGRAM(s) Oral every 24 hours  furosemide    Tablet 40 milliGRAM(s) Oral daily  HYDROmorphone PCA (5 mG/mL) 30 milliLiter(s) PCA Continuous PCA Continuous  influenza   Vaccine 0.5 milliLiter(s) IntraMuscular once  metoprolol tartrate 25 milliGRAM(s) Oral two times a day  mupirocin 2% Nasal 1 Application(s) Both Nostrils two times a day  pantoprazole    Tablet 40 milliGRAM(s) Oral before breakfast  sodium chloride 0.45%. 1000 milliLiter(s) (75 mL/Hr) IV Continuous <Continuous>    MEDICATIONS  (PRN):  naloxone Injectable 0.1 milliGRAM(s) IV Push once PRN Opioid Overdose      ROS  No fever, sweats, chills  + back pain.   No CP, SOB, cough, sputum      Vital Signs Last 24 Hrs  T(C): 36.6 (17 Dec 2024 09:30), Max: 37.2 (16 Dec 2024 17:50)  T(F): 97.9 (17 Dec 2024 09:30), Max: 98.9 (16 Dec 2024 17:50)  HR: 86 (17 Dec 2024 09:30) (78 - 93)  BP: 159/101 (17 Dec 2024 09:30) (140/89 - 172/83)  BP(mean): --  RR: 19 (17 Dec 2024 09:30) (16 - 19)  SpO2: 100% (17 Dec 2024 09:30) (93% - 100%)    Parameters below as of 17 Dec 2024 09:30  Patient On (Oxygen Delivery Method): room air        PE  NAD  Awake, alert                            6.1    7.63  )-----------( 301      ( 17 Dec 2024 10:15 )             17.0       12-17    137  |  101  |  32[H]  ----------------------------<  110[H]  3.9   |  24  |  1.00    Ca    9.2      17 Dec 2024 10:15  Phos  2.4     12-17  Mg     1.40     12-17

## 2024-12-17 NOTE — PROGRESS NOTE ADULT - PROBLEM SELECTOR PLAN 1
Patient presents with L rib and back pain s/p trauma  Patient reports hg baseline is ~ 5.5  - C/w dilaudid PCA, monitor pain, wean PCA as able  - Bowel regimen with Senna daily, miralax prn  -- Patient follows Dr Jordan of [facility] - appreciate hem/onc input here, s/p 1 unit pRBC this admission. Per heme, hold off on further transfusion unless hg<5.5.   - Pt on Jadenu at home, f/u Heme onc if to be given while inpatient  - no signs of acute chest- addressed below  - Increased pain in the evening of 12/16 and today- increased 4 hr limit as patient was getting locked out, no change in demand doses/frequency  - Concern for opioid withdrawal overnight (see chart note), this morning during my encounter, HR 80s, no chills/flushing, sitting still during encounter, pupils normal sized, reactive b/l, continued pain 2/2 SCC, no rhinorrhea/tearing, GI sxs, tremor, yawning. + anxiety, no piloerection of skin. Lower suspicion for acute withdrawal this morning. Will uptitrate 4 hr limit on PCA as above. No signs of oversomnolence. Will give one time 0.25mg PO ativan to help with anxiety. Monitor very closely for respiratory depression (no current signs of this). Reassessed shortly after, feeling less restless/anxious, no concern for respiratory depression or altered mentation. Monitor closely.

## 2024-12-17 NOTE — PROGRESS NOTE ADULT - ASSESSMENT
44 year old male with history of sickle cell anemia presenting today s/p mechanical fall    Sickle cell disease  - follows with Dr Jordan of [facility]  - Hgb 4.9 on 12/13, now s/p 1u PRBCs. --> Hgb 6.5  - chronic hemolysis, Iron overload  - pls limit transfusions, maintain Hgb 5.5  - Continue Jadenu, maximize pain control  - ongoing care after discharge  - Hgb today (12/17/2024) improved to 6.1.  - will hold off on prbc unless Hgb <5.5.   - continues to experience back pain, on Dilaudid PCA pump and given Ativan today.   - monitor sxs and cbc closely.   - no concerns for acute chest syndrome.    Fall  -s/p mechanical fall, CTH negative  -admitted with rib pain and SOB on ambulation  -CTA without pulmonary embolism  -Defer pain management to medicine team    will continue to follow    Ramona Pedroza PA-C  Hematology/Oncology  [facility]  [phone] (office)   Evenings and weekends please call MD on call or office

## 2024-12-18 DIAGNOSIS — R50.9 FEVER, UNSPECIFIED: ICD-10-CM

## 2024-12-18 LAB
ADD ON TEST-SPECIMEN IN LAB: SIGNIFICANT CHANGE UP
ALBUMIN SERPL ELPH-MCNC: 3.6 G/DL — SIGNIFICANT CHANGE UP (ref 3.3–5)
ALP SERPL-CCNC: 152 U/L — HIGH (ref 40–120)
ALT FLD-CCNC: 51 U/L — HIGH (ref 4–41)
ANION GAP SERPL CALC-SCNC: 12 MMOL/L — SIGNIFICANT CHANGE UP (ref 7–14)
AST SERPL-CCNC: 139 U/L — HIGH (ref 4–40)
B PERT DNA SPEC QL NAA+PROBE: SIGNIFICANT CHANGE UP
B PERT+PARAPERT DNA PNL SPEC NAA+PROBE: SIGNIFICANT CHANGE UP
BILIRUB SERPL-MCNC: 5.4 MG/DL — HIGH (ref 0.2–1.2)
BLD GP AB SCN SERPL QL: NEGATIVE — SIGNIFICANT CHANGE UP
BUN SERPL-MCNC: 41 MG/DL — HIGH (ref 7–23)
C PNEUM DNA SPEC QL NAA+PROBE: SIGNIFICANT CHANGE UP
CALCIUM SERPL-MCNC: 8.8 MG/DL — SIGNIFICANT CHANGE UP (ref 8.4–10.5)
CHLORIDE SERPL-SCNC: 101 MMOL/L — SIGNIFICANT CHANGE UP (ref 98–107)
CO2 SERPL-SCNC: 23 MMOL/L — SIGNIFICANT CHANGE UP (ref 22–31)
CREAT SERPL-MCNC: 1.74 MG/DL — HIGH (ref 0.5–1.3)
EGFR: 49 ML/MIN/1.73M2 — LOW
FLUAV AG NPH QL: SIGNIFICANT CHANGE UP
FLUAV SUBTYP SPEC NAA+PROBE: SIGNIFICANT CHANGE UP
FLUBV AG NPH QL: SIGNIFICANT CHANGE UP
FLUBV RNA SPEC QL NAA+PROBE: SIGNIFICANT CHANGE UP
GLUCOSE SERPL-MCNC: 124 MG/DL — HIGH (ref 70–99)
HADV DNA SPEC QL NAA+PROBE: SIGNIFICANT CHANGE UP
HCOV 229E RNA SPEC QL NAA+PROBE: SIGNIFICANT CHANGE UP
HCOV HKU1 RNA SPEC QL NAA+PROBE: SIGNIFICANT CHANGE UP
HCOV NL63 RNA SPEC QL NAA+PROBE: SIGNIFICANT CHANGE UP
HCOV OC43 RNA SPEC QL NAA+PROBE: SIGNIFICANT CHANGE UP
HCT VFR BLD CALC: 16.1 % — CRITICAL LOW (ref 39–50)
HGB BLD-MCNC: 5.6 G/DL — CRITICAL LOW (ref 13–17)
HMPV RNA SPEC QL NAA+PROBE: SIGNIFICANT CHANGE UP
HPIV1 RNA SPEC QL NAA+PROBE: SIGNIFICANT CHANGE UP
HPIV2 RNA SPEC QL NAA+PROBE: SIGNIFICANT CHANGE UP
HPIV3 RNA SPEC QL NAA+PROBE: SIGNIFICANT CHANGE UP
HPIV4 RNA SPEC QL NAA+PROBE: SIGNIFICANT CHANGE UP
LDH SERPL L TO P-CCNC: 979 U/L — HIGH (ref 135–225)
M PNEUMO DNA SPEC QL NAA+PROBE: SIGNIFICANT CHANGE UP
MAGNESIUM SERPL-MCNC: 1.7 MG/DL — SIGNIFICANT CHANGE UP (ref 1.6–2.6)
MCHC RBC-ENTMCNC: 29.5 PG — SIGNIFICANT CHANGE UP (ref 27–34)
MCHC RBC-ENTMCNC: 34.8 G/DL — SIGNIFICANT CHANGE UP (ref 32–36)
MCV RBC AUTO: 84.7 FL — SIGNIFICANT CHANGE UP (ref 80–100)
NRBC # BLD: 0 /100 WBCS — SIGNIFICANT CHANGE UP (ref 0–0)
NRBC # FLD: 0.05 K/UL — HIGH (ref 0–0)
PHOSPHATE SERPL-MCNC: 3.9 MG/DL — SIGNIFICANT CHANGE UP (ref 2.5–4.5)
PLATELET # BLD AUTO: 311 K/UL — SIGNIFICANT CHANGE UP (ref 150–400)
POTASSIUM SERPL-MCNC: 4.3 MMOL/L — SIGNIFICANT CHANGE UP (ref 3.5–5.3)
POTASSIUM SERPL-SCNC: 4.3 MMOL/L — SIGNIFICANT CHANGE UP (ref 3.5–5.3)
PROT SERPL-MCNC: 6.7 G/DL — SIGNIFICANT CHANGE UP (ref 6–8.3)
RAPID RVP RESULT: SIGNIFICANT CHANGE UP
RBC # BLD: 1.9 M/UL — LOW (ref 4.2–5.8)
RBC # BLD: 1.9 M/UL — LOW (ref 4.2–5.8)
RBC # FLD: 21.8 % — HIGH (ref 10.3–14.5)
RETICS #: 188.7 K/UL — HIGH (ref 25–125)
RETICS/RBC NFR: 9.9 % — HIGH (ref 0.5–2.5)
RH IG SCN BLD-IMP: POSITIVE — SIGNIFICANT CHANGE UP
RSV RNA NPH QL NAA+NON-PROBE: SIGNIFICANT CHANGE UP
RSV RNA SPEC QL NAA+PROBE: SIGNIFICANT CHANGE UP
RV+EV RNA SPEC QL NAA+PROBE: SIGNIFICANT CHANGE UP
SARS-COV-2 RNA SPEC QL NAA+PROBE: SIGNIFICANT CHANGE UP
SARS-COV-2 RNA SPEC QL NAA+PROBE: SIGNIFICANT CHANGE UP
SODIUM SERPL-SCNC: 136 MMOL/L — SIGNIFICANT CHANGE UP (ref 135–145)
WBC # BLD: 12 K/UL — HIGH (ref 3.8–10.5)
WBC # FLD AUTO: 12 K/UL — HIGH (ref 3.8–10.5)

## 2024-12-18 PROCEDURE — 99233 SBSQ HOSP IP/OBS HIGH 50: CPT

## 2024-12-18 PROCEDURE — 99221 1ST HOSP IP/OBS SF/LOW 40: CPT

## 2024-12-18 PROCEDURE — 99222 1ST HOSP IP/OBS MODERATE 55: CPT

## 2024-12-18 RX ORDER — SODIUM CHLORIDE 9 MG/ML
1000 INJECTION, SOLUTION INTRAVENOUS
Refills: 0 | Status: DISCONTINUED | OUTPATIENT
Start: 2024-12-18 | End: 2025-01-03

## 2024-12-18 RX ORDER — ACETAMINOPHEN 80 MG/.8ML
1000 SOLUTION/ DROPS ORAL ONCE
Refills: 0 | Status: COMPLETED | OUTPATIENT
Start: 2024-12-18 | End: 2024-12-18

## 2024-12-18 RX ORDER — DIPHENHYDRAMINE HCL 25 MG
25 TABLET ORAL ONCE
Refills: 0 | Status: COMPLETED | OUTPATIENT
Start: 2024-12-18 | End: 2024-12-18

## 2024-12-18 RX ORDER — ONDANSETRON 4 MG/1
4 TABLET ORAL ONCE
Refills: 0 | Status: COMPLETED | OUTPATIENT
Start: 2024-12-18 | End: 2024-12-18

## 2024-12-18 RX ADMIN — ACETAMINOPHEN 400 MILLIGRAM(S): 80 SOLUTION/ DROPS ORAL at 15:25

## 2024-12-18 RX ADMIN — Medication 30 MILLILITER(S): at 15:38

## 2024-12-18 RX ADMIN — Medication 1 MILLIGRAM(S): at 11:29

## 2024-12-18 RX ADMIN — Medication 30 MILLILITER(S): at 08:22

## 2024-12-18 RX ADMIN — ONDANSETRON 4 MILLIGRAM(S): 4 TABLET ORAL at 11:38

## 2024-12-18 RX ADMIN — CHLORHEXIDINE GLUCONATE 1 APPLICATION(S): 1.2 RINSE ORAL at 11:30

## 2024-12-18 RX ADMIN — Medication 30 MILLILITER(S): at 20:19

## 2024-12-18 RX ADMIN — Medication 40 MILLIGRAM(S): at 05:13

## 2024-12-18 RX ADMIN — PANTOPRAZOLE 40 MILLIGRAM(S): 40 TABLET, DELAYED RELEASE ORAL at 05:13

## 2024-12-18 RX ADMIN — NIFEDIPINE 30 MILLIGRAM(S): 60 TABLET, EXTENDED RELEASE ORAL at 05:13

## 2024-12-18 RX ADMIN — ALLOPURINOL 50 MILLIGRAM(S): 100 TABLET ORAL at 11:29

## 2024-12-18 RX ADMIN — SODIUM CHLORIDE 30 MILLILITER(S): 9 INJECTION, SOLUTION INTRAVENOUS at 15:25

## 2024-12-18 RX ADMIN — Medication 1 APPLICATION(S): at 05:13

## 2024-12-18 RX ADMIN — ENOXAPARIN SODIUM 40 MILLIGRAM(S): 60 INJECTION INTRAVENOUS; SUBCUTANEOUS at 18:43

## 2024-12-18 RX ADMIN — Medication 1 APPLICATION(S): at 18:42

## 2024-12-18 RX ADMIN — Medication 25 MILLIGRAM(S): at 15:25

## 2024-12-18 NOTE — CONSULT NOTE ADULT - ASSESSMENT
43 yo man with sickle cell disease admitted 12/9 after mechanical fall.  h/o staph epi bacteremia Oct 2024  port in place x 10 years    receiving  PCA     noted to be anemic; received a blood transfusion 12/13    fever 101 yesterday 12/17      afebrile today;  nausea+    Fever x 1 unclear etiology   No localizing signs of infection on exam   Port in place site ok  WBC 12K     Blood cultures 12/17 testing     Would follow blood cultures   If continues febrile would start meropenem      will follow

## 2024-12-18 NOTE — PROGRESS NOTE ADULT - ASSESSMENT
44 year old male with history of sickle cell anemia presenting today s/p mechanical fall    Sickle cell disease  - follows with Dr Jordan of [facility]  - Hgb 4.9 on 12/13, now s/p 1u PRBCs. --> Hgb 6.5  - chronic hemolysis, Iron overload  - pls limit transfusions, maintain Hgb 5.5  - Continue Jadenu, maximize pain control  - ongoing care after discharge  - Hgb today 5.6, will receive 1U PRBC today, pt reports symptoms of SOB, fatigue and nausea  - continues to experience back pain, on Dilaudid PCA pump  - monitor sxs and cbc closely.     Fever  - Febrile  101.2 overnight  - CXR pending  - Blood cx pending    Fall  -s/p mechanical fall, CTH negative  -admitted with rib pain and SOB on ambulation  -CTA without pulmonary embolism  -Defer pain management to medicine team    will continue to follow    Chantal Patel NP  Hematology/Oncology  [facility]  [phone] (office)   Evenings and weekends please call MD on call or office     Quality 226: Preventive Care And Screening: Tobacco Use: Screening And Cessation Intervention: Patient screened for tobacco use and is an ex/non-smoker Quality 110: Preventive Care And Screening: Influenza Immunization: Influenza Immunization previously received during influenza season Detail Level: Detailed Quality 111:Pneumonia Vaccination Status For Older Adults: Pneumococcal vaccine (PPSV23) administered on or after patient’s 60th birthday and before the end of the measurement period Quality 130: Documentation Of Current Medications In The Medical Record: Current Medications Documented Quality 431: Preventive Care And Screening: Unhealthy Alcohol Use - Screening: Patient not identified as an unhealthy alcohol user when screened for unhealthy alcohol use using a systematic screening method

## 2024-12-18 NOTE — PROGRESS NOTE ADULT - SUBJECTIVE AND OBJECTIVE BOX
Patient is a 44y old  Male who presents with a chief complaint of mech fall, pain, dyspnea (17 Dec 2024 13:33)      MEDICATIONS  (STANDING):  acetaminophen   IVPB .. 1000 milliGRAM(s) IV Intermittent once  allopurinol 50 milliGRAM(s) Oral daily  chlorhexidine 2% Cloths 1 Application(s) Topical daily  enoxaparin Injectable 40 milliGRAM(s) SubCutaneous every 24 hours  folic acid 1 milliGRAM(s) Oral every 24 hours  HYDROmorphone PCA (5 mG/mL) 30 milliLiter(s) PCA Continuous PCA Continuous  influenza   Vaccine 0.5 milliLiter(s) IntraMuscular once  mupirocin 2% Nasal 1 Application(s) Both Nostrils two times a day  NIFEdipine XL 30 milliGRAM(s) Oral daily  pantoprazole    Tablet 40 milliGRAM(s) Oral before breakfast  sodium chloride 0.45%. 1000 milliLiter(s) (75 mL/Hr) IV Continuous <Continuous>    MEDICATIONS  (PRN):  diphenhydrAMINE Injectable 25 milliGRAM(s) IV Push once PRN 15 mins prior to PRBC transfusion  naloxone Injectable 0.1 milliGRAM(s) IV Push once PRN Opioid Overdose    Vital Signs Last 24 Hrs  T(C): 36.7 (18 Dec 2024 10:20), Max: 38.4 (17 Dec 2024 21:35)  T(F): 98.1 (18 Dec 2024 10:20), Max: 101.2 (17 Dec 2024 21:35)  HR: 84 (18 Dec 2024 10:20) (84 - 114)  BP: 123/69 (18 Dec 2024 10:20) (116/71 - 154/91)  BP(mean): --  RR: 18 (18 Dec 2024 10:20) (17 - 18)  SpO2: 96% (18 Dec 2024 10:20) (93% - 100%)    Parameters below as of 18 Dec 2024 10:20  Patient On (Oxygen Delivery Method): room air    PE  NAD  Awake, alert  Anicteric, MMM  RRR  CTAB  Abd soft, NT, ND                          5.6    12.00 )-----------( 311      ( 18 Dec 2024 05:00 )             16.1       12-18    136  |  101  |  41[H]  ----------------------------<  124[H]  4.3   |  23  |  1.74[H]    Ca    8.8      18 Dec 2024 05:00  Phos  3.9     12-18  Mg     1.70     12-18    TPro  6.7  /  Alb  3.6  /  TBili  5.4[H]  /  DBili  x   /  AST  139[H]  /  ALT  51[H]  /  AlkPhos  152[H]  12-18

## 2024-12-18 NOTE — CHART NOTE - NSCHARTNOTEFT_GEN_A_CORE
Note       44 year old male with hx sickle cell, prior acute chest, coming in with dyspnea and acute pain, admitted for sickle cell pain crisis, patient also presents with L rib and back pain s/p trauma.  In ED patient was hypoxic in the 80s on RA  likely due to L rib trauma.  CTA chest shows no infiltrates, negative for PE.    Overnight noted with fever 101.2 spo2: fluctuating in 80s. Patient seen at bedside asymptomatic, no acute sob/cp, headache, dizziness. Patient sitting in bed eating. Due to new fever will order sepsis work up cbc, cmp, lactic, procal, CXR, BCx x2  and IV toradol. Patient placed on oxygen 3L/min and  toradol 30mg x1 IVP ordered.   Labs reviewed wbc : 12.2, procal: 0.14 - S/p toradol vitals: 98.4, 129/76. 107, spo2: 96%. Will follow up CXR. will hold abx for now if noted with further fever or symptomatic will consider starting antibiotics.

## 2024-12-18 NOTE — CONSULT NOTE ADULT - SUBJECTIVE AND OBJECTIVE BOX
HPI:  Patient is a 44M with a PMH of sickle cell, prior CTAs for acute chest admitted  after mechanical fall; landed on his L ribs.  He was noted anemic; received transfusion   Yesterday febrile; blood cultures drawn   Today has nausea   no fever, chills   States felt withdrawal symptoms 2 days ago which resolved with Dilaudid iv   Port in place x 10 years          PAST MEDICAL & SURGICAL HISTORY:  Sickle cell anemia      Gout      History of cholecystectomy          Allergies    penicillin (Pruritus)  hydroxyurea (Other)  ceftriaxone (Anaphylaxis)  piperacillin-tazobactam (Urticaria)    Intolerances        ANTIMICROBIALS:      OTHER MEDS:  acetaminophen   IVPB .. 1000 milliGRAM(s) IV Intermittent once  allopurinol 50 milliGRAM(s) Oral daily  chlorhexidine 2% Cloths 1 Application(s) Topical daily  diphenhydrAMINE Injectable 25 milliGRAM(s) IV Push once PRN  enoxaparin Injectable 40 milliGRAM(s) SubCutaneous every 24 hours  folic acid 1 milliGRAM(s) Oral every 24 hours  HYDROmorphone PCA (5 mG/mL) 30 milliLiter(s) PCA Continuous PCA Continuous  influenza   Vaccine 0.5 milliLiter(s) IntraMuscular once  mupirocin 2% Nasal 1 Application(s) Both Nostrils two times a day  naloxone Injectable 0.1 milliGRAM(s) IV Push once PRN  NIFEdipine XL 30 milliGRAM(s) Oral daily  pantoprazole    Tablet 40 milliGRAM(s) Oral before breakfast  sodium chloride 0.45%. 1000 milliLiter(s) IV Continuous <Continuous>      SOCIAL HISTORY:    FAMILY HISTORY:  Family history of sickle cell trait (Father, Mother)    Patient's father is  (Father)    Patient's mother is  (Mother)        REVIEW OF SYSTEMS  [  ] ROS unobtainable because:    [x  ] All other systems negative except as noted below:	    Constitutional:  [ ] fever [ ] chills  [ ] weight loss  [ ] weakness  Skin:  [ ] rash [ ] phlebitis	  Eyes: [ ] icterus [ ] pain  [ ] discharge	  ENMT: [ ] sore throat  [ ] thrush [ ] ulcers [ ] exudates  Respiratory: [ ] dyspnea [ ] hemoptysis [ ] cough [ ] sputum	  Cardiovascular:  [ ] chest pain [ ] palpitations [ ] edema	  Gastrointestinal:  [x ] nausea [ ] vomiting [ ] diarrhea [ ] constipation [ ] pain	  Genitourinary:  [ ] dysuria [ ] frequency [ ] hematuria [ ] discharge [ ] flank pain  [ ] incontinence  Musculoskeletal:  [ ] myalgias [ ] arthralgias [ ] arthritis  [ ] back pain  Neurological:  [ ] headache [ ] seizures  [ ] confusion/altered mental status  Psychiatric:  [ ] anxiety [ ] depression	  Hematology/Lymphatics:  [ ] lymphadenopathy  Endocrine:  [ ] adrenal [ ] thyroid  Allergic/Immunologic:	 [ ] transplant [ ] seasonal    PHYSICAL EXAM:  Constitutional: Not in acute distress  Eyes: No icterus.  Oral cavity: Clear, no lesions  Neck: Supple  RS: Chest clear   CVS: S1, S2   Abdomen: Soft. No guarding/rigidity/tenderness.  Skin: No lesions noted  Vascular: port right chest  iv left arm  Neuro: Alert, oriented to time/place/person  Cranial nerves 2-12 grossly normal. No focal abnormalities    Drug Dosing Weight  Height (cm): 175.3 (08 Dec 2024 21:08)  Weight (kg): 87.1 (08 Dec 2024 21:08)  BMI (kg/m2): 28.3 (08 Dec 2024 21:08)  BSA (m2): 2.03 (08 Dec 2024 21:08)    Vital Signs Last 24 Hrs  T(F): 98.1 (12--24 @ 10:20), Max: 101.2 (12-17-24 @ 21:35)    Vital Signs Last 24 Hrs  HR: 84 (-18-24 @ 10:20) (84 - 114)  BP: 123/69 (-18-24 @ 10:20) (116/71 - 147/86)  RR: 18 (--24 @ 10:20)  SpO2: 96% (12--24 @ 10:20) (93% - 98%)  Wt(kg): --                          5.6    12.00 )-----------( 311      ( 18 Dec 2024 05:00 )             16.1       12-18    136  |  101  |  41[H]  ----------------------------<  124[H]  4.3   |  23  |  1.74[H]    Ca    8.8      18 Dec 2024 05:00  Phos  3.9     12-18  Mg     1.70     12-18    TPro  6.7  /  Alb  3.6  /  TBili  5.4[H]  /  DBili  x   /  AST  139[H]  /  ALT  51[H]  /  AlkPhos  152[H]  12-18      Urinalysis Basic - ( 18 Dec 2024 05:00 )    Color: x / Appearance: x / SG: x / pH: x  Gluc: 124 mg/dL / Ketone: x  / Bili: x / Urobili: x   Blood: x / Protein: x / Nitrite: x   Leuk Esterase: x / RBC: x / WBC x   Sq Epi: x / Non Sq Epi: x / Bacteria: x        MICROBIOLOGY:      blood cultures testing       RADIOLOGY:     HPI:  Patient is a 44M with a PMH of sickle cell, prior CTAs for acute chest admitted  after mechanical fall; landed on his L ribs.  He was noted anemic; received transfusion   Yesterday febrile; blood cultures drawn.  Today has nausea   no fever, chills   States felt withdrawal symptoms 2 days ago which resolved with Dilaudid iv   Port in place x 10 years          PAST MEDICAL & SURGICAL HISTORY:  Sickle cell anemia      Gout      History of cholecystectomy          Allergies    penicillin (Pruritus)  hydroxyurea (Other)  ceftriaxone (Anaphylaxis)  piperacillin-tazobactam (Urticaria)    Intolerances        ANTIMICROBIALS:      OTHER MEDS:  acetaminophen   IVPB .. 1000 milliGRAM(s) IV Intermittent once  allopurinol 50 milliGRAM(s) Oral daily  chlorhexidine 2% Cloths 1 Application(s) Topical daily  diphenhydrAMINE Injectable 25 milliGRAM(s) IV Push once PRN  enoxaparin Injectable 40 milliGRAM(s) SubCutaneous every 24 hours  folic acid 1 milliGRAM(s) Oral every 24 hours  HYDROmorphone PCA (5 mG/mL) 30 milliLiter(s) PCA Continuous PCA Continuous  influenza   Vaccine 0.5 milliLiter(s) IntraMuscular once  mupirocin 2% Nasal 1 Application(s) Both Nostrils two times a day  naloxone Injectable 0.1 milliGRAM(s) IV Push once PRN  NIFEdipine XL 30 milliGRAM(s) Oral daily  pantoprazole    Tablet 40 milliGRAM(s) Oral before breakfast  sodium chloride 0.45%. 1000 milliLiter(s) IV Continuous <Continuous>      SOCIAL HISTORY:    FAMILY HISTORY:  Family history of sickle cell trait (Father, Mother)    Patient's father is  (Father)    Patient's mother is  (Mother)        REVIEW OF SYSTEMS  [  ] ROS unobtainable because:    [x  ] All other systems negative except as noted below:	    Constitutional:  [ ] fever [ ] chills  [ ] weight loss  [ ] weakness  Skin:  [ ] rash [ ] phlebitis	  Eyes: [ ] icterus [ ] pain  [ ] discharge	  ENMT: [ ] sore throat  [ ] thrush [ ] ulcers [ ] exudates  Respiratory: [ ] dyspnea [ ] hemoptysis [ ] cough [ ] sputum	  Cardiovascular:  [ ] chest pain [ ] palpitations [ ] edema	  Gastrointestinal:  [x ] nausea [ ] vomiting [ ] diarrhea [ ] constipation [ ] pain	  Genitourinary:  [ ] dysuria [ ] frequency [ ] hematuria [ ] discharge [ ] flank pain  [ ] incontinence  Musculoskeletal:  [ ] myalgias [ ] arthralgias [ ] arthritis  [ ] back pain  Neurological:  [ ] headache [ ] seizures  [ ] confusion/altered mental status  Psychiatric:  [ ] anxiety [ ] depression	  Hematology/Lymphatics:  [ ] lymphadenopathy  Endocrine:  [ ] adrenal [ ] thyroid  Allergic/Immunologic:	 [ ] transplant [ ] seasonal    PHYSICAL EXAM:  Constitutional: Not in acute distress  Eyes: No icterus.  Oral cavity: Clear, no lesions  Neck: Supple  RS: Chest clear   CVS: S1, S2   Abdomen: Soft. No guarding/rigidity/tenderness.  Skin: No lesions noted  Vascular: port right chest  iv left arm  Neuro: Alert, oriented to time/place/person  Cranial nerves 2-12 grossly normal. No focal abnormalities    Drug Dosing Weight  Height (cm): 175.3 (08 Dec 2024 21:08)  Weight (kg): 87.1 (08 Dec 2024 21:08)  BMI (kg/m2): 28.3 (08 Dec 2024 21:08)  BSA (m2): 2.03 (08 Dec 2024 21:08)    Vital Signs Last 24 Hrs  T(F): 98.1 (--24 @ 10:20), Max: 101.2 (12-17-24 @ 21:35)    Vital Signs Last 24 Hrs  HR: 84 (--24 @ 10:20) (84 - 114)  BP: 123/69 (--24 @ 10:20) (116/71 - 147/86)  RR: 18 (--24 @ 10:20)  SpO2: 96% (--24 @ 10:20) (93% - 98%)  Wt(kg): --                          5.6    12.00 )-----------( 311      ( 18 Dec 2024 05:00 )             16.1       12-18    136  |  101  |  41[H]  ----------------------------<  124[H]  4.3   |  23  |  1.74[H]    Ca    8.8      18 Dec 2024 05:00  Phos  3.9     12-18  Mg     1.70     12-18    TPro  6.7  /  Alb  3.6  /  TBili  5.4[H]  /  DBili  x   /  AST  139[H]  /  ALT  51[H]  /  AlkPhos  152[H]  12-18      Urinalysis Basic - ( 18 Dec 2024 05:00 )    Color: x / Appearance: x / SG: x / pH: x  Gluc: 124 mg/dL / Ketone: x  / Bili: x / Urobili: x   Blood: x / Protein: x / Nitrite: x   Leuk Esterase: x / RBC: x / WBC x   Sq Epi: x / Non Sq Epi: x / Bacteria: x        MICROBIOLOGY:      blood cultures testing       RADIOLOGY:    < from: Xray Chest 1 View-PORTABLE IMMEDIATE (Xray Chest 1 View-PORTABLE IMMEDIATE .) (24 @ 22:04) >    ACC: [id number] EXAM:  XR CHEST PORTABLE IMMED 1V   ORDERED BY:   ABIGAIL LERMA     PROCEDURE DATE:  2024          INTERPRETATION:  EXAMINATION: XR CHEST IMMEDIATE    CLINICAL INDICATION: Dyspnea    TECHNIQUE: Single frontal, portable viewof the chest was obtained.    COMPARISON: Chest x-ray 2024    FINDINGS:  Right chest wall Mediport with tip in the SVC.  The heart is unchanged in size.  Chronic left basilar atelectasis. No new focal consolidation.  There is no pneumothorax orpleural effusion.    IMPRESSION: Cardiomegaly with clear lungs.      --- End of Report ---          ATIF YAN MD; Resident Radiologist  This document has been electronically signed.    < end of copied text >

## 2024-12-18 NOTE — PROGRESS NOTE ADULT - NS ATTEND AMEND GEN_ALL_CORE FT
pt had fever overnight. ID called   complaining of fatigue and dizziness. will recommend on unit of prbc at this time  discussed with primary team

## 2024-12-18 NOTE — PROGRESS NOTE ADULT - PROBLEM SELECTOR PLAN 1
Fever to 101.2 at 21:35 on 12/17/24. Reported to have O2 sat in 80s at that time but patient denied dyspnea, since then patient is back on RA satting well. No other new symptoms at time of fever (see chart note). CXR clear. CTA negative earlier in hospitalization for PE. No dysuria, rash or other focal infectious symptoms. Recent staph epi bacteremia during November admission here, seen by ID at that time. Patient has port in place, not appearing acutely infected. No sore throat, cough or other URI symptoms.   - Blood cultures sent  - Lactate nl. New leukocytosis to 12  - No fevers since  - ID consulted, input appreciated

## 2024-12-18 NOTE — PROGRESS NOTE ADULT - SUBJECTIVE AND OBJECTIVE BOX
Patient is a 44y old  Male who presents with a chief complaint of mech fall, pain, dyspnea (17 Dec 2024 13:33)    SUBJECTIVE / OVERNIGHT EVENTS: Febrile to 101.2 at 21:35 last night with SpO2 reported in 80s, CXR and cultures performed last night (see chart note). This AM, patient with slight dizziness, no fevers, chills, shortness of breath, cough, dysuria, rash or signs of bleeding.     MEDICATIONS  (STANDING):  acetaminophen   IVPB .. 1000 milliGRAM(s) IV Intermittent once  allopurinol 50 milliGRAM(s) Oral daily  chlorhexidine 2% Cloths 1 Application(s) Topical daily  enoxaparin Injectable 40 milliGRAM(s) SubCutaneous every 24 hours  folic acid 1 milliGRAM(s) Oral every 24 hours  HYDROmorphone PCA (5 mG/mL) 30 milliLiter(s) PCA Continuous PCA Continuous  influenza   Vaccine 0.5 milliLiter(s) IntraMuscular once  mupirocin 2% Nasal 1 Application(s) Both Nostrils two times a day  NIFEdipine XL 30 milliGRAM(s) Oral daily  pantoprazole    Tablet 40 milliGRAM(s) Oral before breakfast  sodium chloride 0.45%. 1000 milliLiter(s) (75 mL/Hr) IV Continuous <Continuous>    MEDICATIONS  (PRN):  diphenhydrAMINE Injectable 25 milliGRAM(s) IV Push once PRN 15 mins prior to PRBC transfusion  naloxone Injectable 0.1 milliGRAM(s) IV Push once PRN Opioid Overdose      CAPILLARY BLOOD GLUCOSE        I&O's Summary    17 Dec 2024 07:01  -  18 Dec 2024 07:00  --------------------------------------------------------  IN: 750 mL / OUT: 825 mL / NET: -75 mL    18 Dec 2024 07:01  -  18 Dec 2024 12:27  --------------------------------------------------------  IN: 715 mL / OUT: 620 mL / NET: 95 mL        PHYSICAL EXAM:  Vital Signs Last 24 Hrs  T(C): 36.7 (18 Dec 2024 10:20), Max: 38.4 (17 Dec 2024 21:35)  T(F): 98.1 (18 Dec 2024 10:20), Max: 101.2 (17 Dec 2024 21:35)  HR: 84 (18 Dec 2024 10:20) (84 - 114)  BP: 123/69 (18 Dec 2024 10:20) (116/71 - 154/91)  BP(mean): --  RR: 18 (18 Dec 2024 10:20) (17 - 18)  SpO2: 96% (18 Dec 2024 10:20) (93% - 100%)    Parameters below as of 18 Dec 2024 10:20  Patient On (Oxygen Delivery Method): room air    CONSTITUTIONAL: NAD, sitting up in bed  ENMT: Moist oral mucosa  RESPIRATORY: Normal respiratory effort; lungs are clear to auscultation bilaterally  CARDIOVASCULAR: Regular rate and rhythm, normal S1 and S2, b/l pitting lower extremity edema  ABDOMEN: Nontender to palpation, normoactive bowel sounds, no rebound/guarding  PSYCH: calm  SKIN: No rashes    LABS:                        5.6    12.00 )-----------( 311      ( 18 Dec 2024 05:00 )             16.1     12-18    136  |  101  |  41[H]  ----------------------------<  124[H]  4.3   |  23  |  1.74[H]    Ca    8.8      18 Dec 2024 05:00  Phos  3.9     12-18  Mg     1.70     12-18    TPro  6.7  /  Alb  3.6  /  TBili  5.4[H]  /  DBili  x   /  AST  139[H]  /  ALT  51[H]  /  AlkPhos  152[H]  12-18    Urinalysis Basic - ( 18 Dec 2024 05:00 )    Color: x / Appearance: x / SG: x / pH: x  Gluc: 124 mg/dL / Ketone: x  / Bili: x / Urobili: x   Blood: x / Protein: x / Nitrite: x   Leuk Esterase: x / RBC: x / WBC x   Sq Epi: x / Non Sq Epi: x / Bacteria: x    RADIOLOGY & ADDITIONAL TESTS: Reviewed    COORDINATION OF CARE:  Care Discussed with Consultants/Other Providers [Y- Medicine ACP, CM, SW, ID, Renal, Hem/Onc attending]

## 2024-12-18 NOTE — CONSULT NOTE ADULT - SUBJECTIVE AND OBJECTIVE BOX
[facility] DIVISION OF KIDNEY DISEASES AND HYPERTENSION -- INITIAL CONSULT NOTE  --------------------------------------------------------------------------------  HPI: 44M with hx of sickle cell disease, cirrhosis, hemochromatosis, and gout who presented to [facility] on 24 with dyspnea and rib pain after a mechanical fall two days prior to admission. Nephrology consulted for MATA.    Upon presentation to the ED, pt was tachycardic and hypoxic, requiring 3L supplemental O2. Labs notable for hgb 6.0, WBC 14k, SCr 1.89. Pt had CTA chest on  that showed no PE, no acute chest, but showed healed right rib fracture and his chronic cirrhosis. Pt received 1L NS bolus in ED and continued on 1/2 NS as maintenance. SCr peaked at 2.46 on 12/10/24 and then trended down to 0.99 on 24. On 24, his SCr began to trend up again to 1.38 and then 1.74 on 24. Of note, pt did receive one dose of IV Ketorolac 30mg on 24. Pt also had one recorded fever (T101.2F) on 24. Of note, pt was recently hospitalized 10/25-24 for sickle cell anemia and pain. He had received IV contrast and one dose of Ketorolac, and developed MATA as well with peak SCr of 2.31. Pt had received IVF and PRBC transfusions at that time as well and then became mildly volume overloaded requiring IV diuresis with gradual improvement in his SCr to 1.57 on day of discharge.    Pt was seen and examined at bedside. Pt states his dyspnea has improved, but was on 3L O2 today. He does report significant LE edema. Denied HA, dizziness, SOB, chest pain, abdominal pain, nausea, vomiting, has some soft stools.      PAST HISTORY  --------------------------------------------------------------------------------  PAST MEDICAL & SURGICAL HISTORY:  Sickle cell anemia  Gout  History of cholecystectomy      FAMILY HISTORY:  Family history of sickle cell trait (Father, Mother)    Patient's father is  (Father)    Patient's mother is  (Mother)      PAST SOCIAL HISTORY:    ALLERGIES & MEDICATIONS  --------------------------------------------------------------------------------  Allergies    penicillin (Pruritus)  hydroxyurea (Other)  ceftriaxone (Anaphylaxis)  piperacillin-tazobactam (Urticaria)      Standing Inpatient Medications  acetaminophen   IVPB .. 1000 milliGRAM(s) IV Intermittent once  allopurinol 50 milliGRAM(s) Oral daily  chlorhexidine 2% Cloths 1 Application(s) Topical daily  enoxaparin Injectable 40 milliGRAM(s) SubCutaneous every 24 hours  folic acid 1 milliGRAM(s) Oral every 24 hours  HYDROmorphone PCA (5 mG/mL) 30 milliLiter(s) PCA Continuous PCA Continuous  influenza   Vaccine 0.5 milliLiter(s) IntraMuscular once  mupirocin 2% Nasal 1 Application(s) Both Nostrils two times a day  NIFEdipine XL 30 milliGRAM(s) Oral daily  pantoprazole    Tablet 40 milliGRAM(s) Oral before breakfast  sodium chloride 0.45%. 1000 milliLiter(s) IV Continuous <Continuous>    PRN Inpatient Medications  diphenhydrAMINE Injectable 25 milliGRAM(s) IV Push once PRN  naloxone Injectable 0.1 milliGRAM(s) IV Push once PRN      REVIEW OF SYSTEMS  --------------------------------------------------------------------------------  Gen: No fever  Respiratory: No dyspnea  CV: No chest pain  GI: No abdominal pain, diarrhea, nausea, vomiting  : No increased frequency, dysuria, hematuria, nocturia  Skin: No rashes  MSK: +Leg edema  Neuro: No dizziness/lightheadedness  Heme: No easy bruising or bleeding    All other systems were reviewed and are negative, except as noted.    VITALS/PHYSICAL EXAM  --------------------------------------------------------------------------------  T(C): 36.7 (24 @ 10:20), Max: 38.4 (24 @ 21:35)  HR: 84 (24 @ 10:20) (84 - 114)  BP: 123/69 (24 @ 10:20) (116/71 - 147/86)  RR: 18 (24 @ 10:20) (17 - 18)  SpO2: 96% (24 @ 10:20) (93% - 98%)  Wt(kg): --        24 @ 07:01  -  24 @ 07:00  --------------------------------------------------------  IN: 750 mL / OUT: 825 mL / NET: -75 mL    24 @ 07:01  -  24 @ 14:08  --------------------------------------------------------  IN: 715 mL / OUT: 1020 mL / NET: -305 mL      PHYSICAL EXAM:  Gen: NAD  Neuro: non-focal  HEENT: Anicteric, No JVD  Pulm: CTA B/L  CV: +S1S2  Abd: soft, non-tender/non-distended  Extremities: ++B/L LE pitting edema  Skin: Warm    LABS/STUDIES  --------------------------------------------------------------------------------              5.6    12.00 >-----------<  311      [24 05:00]              16.1     136  |  101  |  41  ----------------------------<  124      [24 05:00]  4.3   |  23  |  1.74        Ca     8.8     [24 05:00]      Mg     1.70     [24 05:00]      Phos  3.9     [24 05:00]    TPro  6.7  /  Alb  3.6  /  TBili  5.4  /  DBili  x   /  AST  139  /  ALT  51  /  AlkPhos  152  [24 05:00]              [24 05:00]    Creatinine Trend:  SCr 1.74 [ 05:00]  SCr 1.38 [ 22:05]  SCr 1.00 [ 10:15]  SCr 0.99 [ 05:10]  SCr 1.25 [12-15 @ 04:42]      Iron 428, TIBC 541, %sat 79      [10-24-24 @ 23:48]  Ferritin 3189      [10-28-24 @ 06:55]    HBsAb 18.1      [24 04:30]  HBsAg Nonreact      [10-26-24 @ 06:57]  HBcAb Nonreact      [24 04:30]  HCV 0.09, Nonreact      [10-26-24 @ 06:57]  HIV Nonreact      [10-13-23 @ 01:00]    OSMANY: titer 1:160, pattern Homogeneous      [24 04:30]

## 2024-12-18 NOTE — PROGRESS NOTE ADULT - PROBLEM SELECTOR PLAN 2
Patient presents with L rib and back pain s/p trauma  Patient reports hg baseline is ~ 5.5  - C/w dilaudid PCA, monitor pain, wean PCA as able  - Bowel regimen with Senna daily, miralax prn  -- Patient follows Dr Jordan of [facility] - appreciate hem/onc input here, s/p 1 unit pRBC this admission. Hg today 5.6. D/w Heme Onc recommending additional 1 unit pRBC today.    - Pt on Jadenu at home, discussed with Heme onc, hold at this time, plan to resume at time of discharge   - no signs of acute chest- addressed below  - Continues on dilaudid PCA, wean as able, pain still severe at this time  - Feeling of restlessness/ concern for possible opioid withdrawal- improved. No longer feeling jittery or restless. Clinically without withdrawal symptoms. CTM.

## 2024-12-19 LAB
ALBUMIN SERPL ELPH-MCNC: 3.6 G/DL — SIGNIFICANT CHANGE UP (ref 3.3–5)
ALP SERPL-CCNC: 166 U/L — HIGH (ref 40–120)
ALT FLD-CCNC: 68 U/L — HIGH (ref 4–41)
ANION GAP SERPL CALC-SCNC: 13 MMOL/L — SIGNIFICANT CHANGE UP (ref 7–14)
AST SERPL-CCNC: 141 U/L — HIGH (ref 4–40)
BASOPHILS # BLD AUTO: 0.13 K/UL — SIGNIFICANT CHANGE UP (ref 0–0.2)
BASOPHILS NFR BLD AUTO: 1.1 % — SIGNIFICANT CHANGE UP (ref 0–2)
BILIRUB SERPL-MCNC: 6.6 MG/DL — HIGH (ref 0.2–1.2)
BUN SERPL-MCNC: 29 MG/DL — HIGH (ref 7–23)
CALCIUM SERPL-MCNC: 8.8 MG/DL — SIGNIFICANT CHANGE UP (ref 8.4–10.5)
CHLORIDE SERPL-SCNC: 102 MMOL/L — SIGNIFICANT CHANGE UP (ref 98–107)
CO2 SERPL-SCNC: 23 MMOL/L — SIGNIFICANT CHANGE UP (ref 22–31)
CREAT SERPL-MCNC: 1.14 MG/DL — SIGNIFICANT CHANGE UP (ref 0.5–1.3)
EGFR: 81 ML/MIN/1.73M2 — SIGNIFICANT CHANGE UP
EOSINOPHIL # BLD AUTO: 0.24 K/UL — SIGNIFICANT CHANGE UP (ref 0–0.5)
EOSINOPHIL NFR BLD AUTO: 2.1 % — SIGNIFICANT CHANGE UP (ref 0–6)
GLUCOSE SERPL-MCNC: 118 MG/DL — HIGH (ref 70–99)
HCT VFR BLD CALC: 19.6 % — CRITICAL LOW (ref 39–50)
HGB BLD-MCNC: 6.8 G/DL — CRITICAL LOW (ref 13–17)
IANC: 7.97 K/UL — HIGH (ref 1.8–7.4)
IMM GRANULOCYTES NFR BLD AUTO: 1.3 % — HIGH (ref 0–0.9)
LDH SERPL L TO P-CCNC: 845 U/L — HIGH (ref 135–225)
LYMPHOCYTES # BLD AUTO: 18.2 % — SIGNIFICANT CHANGE UP (ref 13–44)
LYMPHOCYTES # BLD AUTO: 2.1 K/UL — SIGNIFICANT CHANGE UP (ref 1–3.3)
MAGNESIUM SERPL-MCNC: 1.3 MG/DL — LOW (ref 1.6–2.6)
MCHC RBC-ENTMCNC: 29.2 PG — SIGNIFICANT CHANGE UP (ref 27–34)
MCHC RBC-ENTMCNC: 34.7 G/DL — SIGNIFICANT CHANGE UP (ref 32–36)
MCV RBC AUTO: 84.1 FL — SIGNIFICANT CHANGE UP (ref 80–100)
MONOCYTES # BLD AUTO: 0.92 K/UL — HIGH (ref 0–0.9)
MONOCYTES NFR BLD AUTO: 8 % — SIGNIFICANT CHANGE UP (ref 2–14)
NEUTROPHILS # BLD AUTO: 7.97 K/UL — HIGH (ref 1.8–7.4)
NEUTROPHILS NFR BLD AUTO: 69.3 % — SIGNIFICANT CHANGE UP (ref 43–77)
NRBC # BLD: 0 /100 WBCS — SIGNIFICANT CHANGE UP (ref 0–0)
NRBC # FLD: 0.07 K/UL — HIGH (ref 0–0)
PHOSPHATE SERPL-MCNC: 2.8 MG/DL — SIGNIFICANT CHANGE UP (ref 2.5–4.5)
PLATELET # BLD AUTO: 312 K/UL — SIGNIFICANT CHANGE UP (ref 150–400)
POTASSIUM SERPL-MCNC: 4 MMOL/L — SIGNIFICANT CHANGE UP (ref 3.5–5.3)
POTASSIUM SERPL-SCNC: 4 MMOL/L — SIGNIFICANT CHANGE UP (ref 3.5–5.3)
PROT SERPL-MCNC: 6.8 G/DL — SIGNIFICANT CHANGE UP (ref 6–8.3)
RBC # BLD: 2.33 M/UL — LOW (ref 4.2–5.8)
RBC # BLD: 2.33 M/UL — LOW (ref 4.2–5.8)
RBC # FLD: 20 % — HIGH (ref 10.3–14.5)
RETICS #: 247.7 K/UL — HIGH (ref 25–125)
RETICS/RBC NFR: 10.6 % — HIGH (ref 0.5–2.5)
SODIUM SERPL-SCNC: 138 MMOL/L — SIGNIFICANT CHANGE UP (ref 135–145)
WBC # BLD: 11.51 K/UL — HIGH (ref 3.8–10.5)
WBC # FLD AUTO: 11.51 K/UL — HIGH (ref 3.8–10.5)

## 2024-12-19 PROCEDURE — 99232 SBSQ HOSP IP/OBS MODERATE 35: CPT | Mod: GC

## 2024-12-19 PROCEDURE — 99232 SBSQ HOSP IP/OBS MODERATE 35: CPT

## 2024-12-19 RX ORDER — ACETAMINOPHEN 80 MG/.8ML
1000 SOLUTION/ DROPS ORAL ONCE
Refills: 0 | Status: COMPLETED | OUTPATIENT
Start: 2024-12-19 | End: 2024-12-19

## 2024-12-19 RX ORDER — MAGNESIUM SULFATE 500 MG/ML
1 INJECTION, SOLUTION INTRAMUSCULAR; INTRAVENOUS ONCE
Refills: 0 | Status: COMPLETED | OUTPATIENT
Start: 2024-12-19 | End: 2024-12-19

## 2024-12-19 RX ADMIN — ACETAMINOPHEN 1000 MILLIGRAM(S): 80 SOLUTION/ DROPS ORAL at 19:04

## 2024-12-19 RX ADMIN — ACETAMINOPHEN 400 MILLIGRAM(S): 80 SOLUTION/ DROPS ORAL at 18:52

## 2024-12-19 RX ADMIN — CHLORHEXIDINE GLUCONATE 1 APPLICATION(S): 1.2 RINSE ORAL at 13:03

## 2024-12-19 RX ADMIN — Medication 1 APPLICATION(S): at 17:06

## 2024-12-19 RX ADMIN — NIFEDIPINE 30 MILLIGRAM(S): 60 TABLET, EXTENDED RELEASE ORAL at 07:03

## 2024-12-19 RX ADMIN — PANTOPRAZOLE 40 MILLIGRAM(S): 40 TABLET, DELAYED RELEASE ORAL at 07:02

## 2024-12-19 RX ADMIN — Medication 30 MILLILITER(S): at 20:35

## 2024-12-19 RX ADMIN — Medication 1 MILLIGRAM(S): at 12:59

## 2024-12-19 RX ADMIN — Medication 1 APPLICATION(S): at 07:03

## 2024-12-19 RX ADMIN — MAGNESIUM SULFATE 100 GRAM(S): 500 INJECTION, SOLUTION INTRAMUSCULAR; INTRAVENOUS at 21:16

## 2024-12-19 RX ADMIN — Medication 30 MILLILITER(S): at 08:30

## 2024-12-19 RX ADMIN — ALLOPURINOL 50 MILLIGRAM(S): 100 TABLET ORAL at 13:00

## 2024-12-19 RX ADMIN — ENOXAPARIN SODIUM 40 MILLIGRAM(S): 60 INJECTION INTRAVENOUS; SUBCUTANEOUS at 17:05

## 2024-12-19 NOTE — PROGRESS NOTE ADULT - SUBJECTIVE AND OBJECTIVE BOX
Patient is a 44y old  Male who presents with a chief complaint of mech fall, pain, dyspnea (19 Dec 2024 13:41)      MEDICATIONS  (STANDING):  allopurinol 50 milliGRAM(s) Oral daily  chlorhexidine 2% Cloths 1 Application(s) Topical daily  enoxaparin Injectable 40 milliGRAM(s) SubCutaneous every 24 hours  folic acid 1 milliGRAM(s) Oral every 24 hours  HYDROmorphone PCA (5 mG/mL) 30 milliLiter(s) PCA Continuous PCA Continuous  influenza   Vaccine 0.5 milliLiter(s) IntraMuscular once  mupirocin 2% Nasal 1 Application(s) Both Nostrils two times a day  NIFEdipine XL 30 milliGRAM(s) Oral daily  pantoprazole    Tablet 40 milliGRAM(s) Oral before breakfast  sodium chloride 0.45%. 1000 milliLiter(s) (30 mL/Hr) IV Continuous <Continuous>    MEDICATIONS  (PRN):  naloxone Injectable 0.1 milliGRAM(s) IV Push once PRN Opioid Overdose      Vital Signs Last 24 Hrs  T(C): 37.1 (19 Dec 2024 07:20), Max: 37.1 (19 Dec 2024 07:20)  T(F): 98.8 (19 Dec 2024 07:20), Max: 98.8 (19 Dec 2024 07:20)  HR: 106 (19 Dec 2024 07:20) (89 - 106)  BP: 132/80 (19 Dec 2024 07:20) (120/63 - 133/71)  BP(mean): --  RR: 18 (19 Dec 2024 07:20) (17 - 18)  SpO2: 98% (19 Dec 2024 07:20) (98% - 100%)    Parameters below as of 19 Dec 2024 07:20  Patient On (Oxygen Delivery Method): nasal cannula  O2 Flow (L/min): 3    PE  NAD  Awake, alert  Anicteric, MMM  RRR  CTAB  Abd soft, NT, ND                          6.8    11.51 )-----------( 312      ( 19 Dec 2024 12:43 )             19.6       12-19    138  |  102  |  29[H]  ----------------------------<  118[H]  4.0   |  23  |  1.14    Ca    8.8      19 Dec 2024 12:43  Phos  2.8     12-19  Mg     1.30     12-19    TPro  6.8  /  Alb  3.6  /  TBili  6.6[H]  /  DBili  x   /  AST  141[H]  /  ALT  68[H]  /  AlkPhos  166[H]  12-19         from: Xray Chest 1 View-PORTABLE IMMEDIATE (Xray Chest 1 View-PORTABLE IMMEDIATE .) (12.17.24 @ 22:04) >    ACC: [id number] EXAM:  XR CHEST PORTABLE IMMED 1V   ORDERED BY:   ABIGAIL LERMA     PROCEDURE DATE:  12/17/2024    INTERPRETATION:  EXAMINATION: XR CHEST IMMEDIATE    CLINICAL INDICATION: Dyspnea    TECHNIQUE: Single frontal, portable viewof the chest was obtained.    COMPARISON: Chest x-ray 12/11/2024    FINDINGS:  Right chest wall Mediport with tip in the SVC.  The heart is unchanged in size.  Chronic left basilar atelectasis. No new focal consolidation.  There is no pneumothorax orpleural effusion.    IMPRESSION: Cardiomegaly with clear lungs.      --- End of Report ---

## 2024-12-19 NOTE — PROGRESS NOTE ADULT - ASSESSMENT
43 yo man with sickle cell disease admitted 12/9 after mechanical fall.  h/o staph epi bacteremia Oct 2024  port in place    receiving  PCA     noted to be anemic; received a blood transfusion 12/13    fever 101 x 1 on  12/17      afebrile since   today feels better   s/p transfusion     Fever x 1 unclear etiology   No localizing signs of infection on exam   Port in place site ok  WBC 11K    Blood cultures 12/17 no growth x 24 h     Would follow blood cultures   Observe off antibiotics for now

## 2024-12-19 NOTE — PROGRESS NOTE ADULT - SUBJECTIVE AND OBJECTIVE BOX
[facility] DIVISION OF KIDNEY DISEASES AND HYPERTENSION --    Chief Complaint: MATA    24 hour events/subjective: Patient seen and examined at bedside. States he feels better after getting blood transfusion yesterday. He feels his edema has slightly improved. No fever or SOB. Non-oliguric      PAST HISTORY  --------------------------------------------------------------------------------  No significant changes to PMH, PSH, FHx, SHx, unless otherwise noted    ALLERGIES & MEDICATIONS  --------------------------------------------------------------------------------  Allergies    penicillin (Pruritus)  hydroxyurea (Other)  ceftriaxone (Anaphylaxis)  piperacillin-tazobactam (Urticaria)    Intolerances      Standing Inpatient Medications  allopurinol 50 milliGRAM(s) Oral daily  chlorhexidine 2% Cloths 1 Application(s) Topical daily  enoxaparin Injectable 40 milliGRAM(s) SubCutaneous every 24 hours  folic acid 1 milliGRAM(s) Oral every 24 hours  HYDROmorphone PCA (5 mG/mL) 30 milliLiter(s) PCA Continuous PCA Continuous  influenza   Vaccine 0.5 milliLiter(s) IntraMuscular once  mupirocin 2% Nasal 1 Application(s) Both Nostrils two times a day  NIFEdipine XL 30 milliGRAM(s) Oral daily  pantoprazole    Tablet 40 milliGRAM(s) Oral before breakfast  sodium chloride 0.45%. 1000 milliLiter(s) IV Continuous <Continuous>    PRN Inpatient Medications  naloxone Injectable 0.1 milliGRAM(s) IV Push once PRN      REVIEW OF SYSTEMS  --------------------------------------------------------------------------------  Gen: No fever  Respiratory: No dyspnea  CV: No chest pain  GI: No abdominal pain, diarrhea, nausea, vomiting  : No increased frequency, dysuria, hematuria, nocturia  Skin: No rashes  MSK: +Leg edema  Neuro: No dizziness/lightheadedness  Heme: No easy bruising or bleeding    All other systems were reviewed and are negative, except as noted.    VITALS/PHYSICAL EXAM  --------------------------------------------------------------------------------  T(C): 37.1 (12-19-24 @ 07:20), Max: 37.1 (12-19-24 @ 07:20)  HR: 106 (12-19-24 @ 07:20) (88 - 106)  BP: 132/80 (12-19-24 @ 07:20) (120/63 - 133/71)  RR: 18 (12-19-24 @ 07:20) (17 - 18)  SpO2: 98% (12-19-24 @ 07:20) (98% - 100%)  Wt(kg): --        12-18-24 @ 07:01  -  12-19-24 @ 07:00  --------------------------------------------------------  IN: 1145 mL / OUT: 1960 mL / NET: -815 mL      PHYSICAL EXAM:  Gen: NAD  Neuro: non-focal  HEENT: Anicteric, No JVD  Pulm: CTA B/L  CV: +S1S2  Abd: soft, non-tender/non-distended  Extremities: ++B/L LE pitting edema  Skin: Warm    LABS/STUDIES  --------------------------------------------------------------------------------              5.6    12.00 >-----------<  311      [12-18-24 @ 05:00]              16.1     136  |  101  |  41  ----------------------------<  124      [12-18-24 @ 05:00]  4.3   |  23  |  1.74        Ca     8.8     [12-18-24 @ 05:00]      Mg     1.70     [12-18-24 @ 05:00]      Phos  3.9     [12-18-24 @ 05:00]    TPro  6.7  /  Alb  3.6  /  TBili  5.4  /  DBili  x   /  AST  139  /  ALT  51  /  AlkPhos  152  [12-18-24 @ 05:00]            [12-18-24 @ 05:00]    Creatinine Trend:  SCr 1.74 [12-18 @ 05:00]  SCr 1.38 [12-17 @ 22:05]  SCr 1.00 [12-17 @ 10:15]  SCr 0.99 [12-16 @ 05:10]  SCr 1.25 [12-15 @ 04:42]    Iron 428, TIBC 541, %sat 79      [10-24-24 @ 23:48]  Ferritin 3189      [10-28-24 @ 06:55]

## 2024-12-19 NOTE — PROGRESS NOTE ADULT - PROBLEM SELECTOR PLAN 2
Patient presents with L rib and back pain s/p trauma  Patient reports hg baseline is ~ 5.5  - C/w dilaudid PCA, monitor pain, wean PCA as able  - Bowel regimen with Senna daily, miralax prn  -- Patient follows Dr Jordan of [facility] - appreciate hem/onc input here, s/p 2 unit pRBC this admission. Trend Hg.   - Pt on Jadenu at home, discussed with Heme onc, hold at this time, plan to resume at time of discharge   - no signs of acute chest- addressed below  - Continues on dilaudid PCA, wean as able, pain still severe at this time  - Feeling of restlessness/ concern for possible opioid withdrawal- improved. No longer feeling jittery or restless. Clinically without withdrawal symptoms. CTM.

## 2024-12-19 NOTE — PROGRESS NOTE ADULT - SUBJECTIVE AND OBJECTIVE BOX
Follow Up:      Interval History/ROS:  feels much better today   afebrile       s/p blood transfusion     Allergies  penicillin (Pruritus)  hydroxyurea (Other)  ceftriaxone (Anaphylaxis)  piperacillin-tazobactam (Urticaria)        ANTIMICROBIALS:      OTHER MEDS:  acetaminophen   IVPB .. 1000 milliGRAM(s) IV Intermittent once  allopurinol 50 milliGRAM(s) Oral daily  chlorhexidine 2% Cloths 1 Application(s) Topical daily  enoxaparin Injectable 40 milliGRAM(s) SubCutaneous every 24 hours  folic acid 1 milliGRAM(s) Oral every 24 hours  HYDROmorphone PCA (5 mG/mL) 30 milliLiter(s) PCA Continuous PCA Continuous  influenza   Vaccine 0.5 milliLiter(s) IntraMuscular once  magnesium sulfate  IVPB 1 Gram(s) IV Intermittent once  naloxone Injectable 0.1 milliGRAM(s) IV Push once PRN  NIFEdipine XL 30 milliGRAM(s) Oral daily  pantoprazole    Tablet 40 milliGRAM(s) Oral before breakfast  sodium chloride 0.45%. 1000 milliLiter(s) IV Continuous <Continuous>      Vital Signs Last 24 Hrs  T(C): 37.1 (19 Dec 2024 07:20), Max: 37.1 (19 Dec 2024 07:20)  T(F): 98.8 (19 Dec 2024 07:20), Max: 98.8 (19 Dec 2024 07:20)  HR: 106 (19 Dec 2024 07:20) (89 - 106)  BP: 132/80 (19 Dec 2024 07:20) (121/69 - 133/71)  BP(mean): --  RR: 18 (19 Dec 2024 07:20) (17 - 18)  SpO2: 98% (19 Dec 2024 07:20) (98% - 100%)    Parameters below as of 19 Dec 2024 07:20  Patient On (Oxygen Delivery Method): nasal cannula  O2 Flow (L/min): 3      PHYSICAL EXAM:  Constitutional: Not in acute distress  Eyes: No icterus.  Oral cavity: Clear, no lesions  Neck: Supple  RS: Chest clear  nasal O2  CVS: S1, S2 heard. Regular rate and rhythm. No murmurs/rubs/gallops.  Abdomen: Soft. No guarding/rigidity/tenderness.  Extremities: min  pedal edema  Vascular: port right chest  iv left arm   Neuro: Alert, oriented to time/place/person  Cranial nerves 2-12 grossly normal. No focal abnormalities                          6.8    11.51 )-----------( 312      ( 19 Dec 2024 12:43 )             19.6       12-19    138  |  102  |  29[H]  ----------------------------<  118[H]  4.0   |  23  |  1.14    Ca    8.8      19 Dec 2024 12:43  Phos  2.8     12-19  Mg     1.30     12-19    TPro  6.8  /  Alb  3.6  /  TBili  6.6[H]  /  DBili  x   /  AST  141[H]  /  ALT  68[H]  /  AlkPhos  166[H]  12-19      Urinalysis Basic - ( 19 Dec 2024 12:43 )    Color: x / Appearance: x / SG: x / pH: x  Gluc: 118 mg/dL / Ketone: x  / Bili: x / Urobili: x   Blood: x / Protein: x / Nitrite: x   Leuk Esterase: x / RBC: x / WBC x   Sq Epi: x / Non Sq Epi: x / Bacteria: x        MICROBIOLOGY:    .Blood BLOOD  12-17-24   No growth at 24 hours  --  --      .Blood BLOOD  12-17-24   No growth at 24 hours  --  --          Rapid RVP Result: NotDetec (12-18 @ 10:09)        RADIOLOGY:    rad< from: Xray Chest 2 Views PA/Lat (12.11.24 @ 18:17) >    ACC: [id number] EXAM:  XR CHEST PA LAT 2V   ORDERED BY: URSULA TAN     PROCEDURE DATE:  12/11/2024          INTERPRETATION:  EXAMINATION: XR CHEST PA AND LATERAL    CLINICAL INDICATION: Leukocytosis, hypoxia, sickle cell disease.    TECHNIQUE: 2 views; Frontal and lateral views of the chest were obtained.    COMPARISON: Chest x-ray 12/8/2024.    FINDINGS:  Right IJ medication port with tip in the SVC.  The heart is unchanged in size.  Chronic left basilar atelectasis/scarring. No new consolidation.  No pneumothorax or pleural effusion.  No acute osseous abnormalities.    IMPRESSION:  No new consolidation to suggest pneumonia.    --- End of Report ---          LOAN CORONEL MD; Resident Radiologist  This document has been electronically signed.  DEREK JUAREZ MD; Attending Radiologist  This document has been electronically signed. Dec 12 2024  6:29AM    < end of copied text >  < from: Xray Chest 1 View-PORTABLE IMMEDIATE (Xray Chest 1 View-PORTABLE IMMEDIATE .) (12.17.24 @ 22:04) >    ACC: [id number] EXAM:  XR CHEST PORTABLE IMMED 1V   ORDERED BY:   ABIGAIL LERMA     PROCEDURE DATE:  12/17/2024          INTERPRETATION:  EXAMINATION: XR CHEST IMMEDIATE    CLINICAL INDICATION: Dyspnea    TECHNIQUE: Single frontal, portable viewof the chest was obtained.    COMPARISON: Chest x-ray 12/11/2024    FINDINGS:  Right chest wall Mediport with tip in the SVC.  The heart is unchanged in size.  Chronic left basilar atelectasis. No new focal consolidation.  There is no pneumothorax orpleural effusion.    IMPRESSION: Cardiomegaly with clear lungs.      --- End of Report ---          ATIF YAN MD; Resident Radiologist  This document has been electronically signed.  DEREK JUAREZ MD; Attending Radiologist  This document has been electronically signed. Dec 18 2024  8:52AM    < end of copied text >

## 2024-12-19 NOTE — PROGRESS NOTE ADULT - PROBLEM SELECTOR PLAN 1
Pt with MATA iso acute on chronic sickle cell anemia and NSAID use. Baseline SCr <1. Admission labs notable for hgb 6.0, WBC 14k, SCr 1.89. Pt had CTA chest on 12/9 that showed no PE, no acute chest, but showed healed right rib fracture and his chronic cirrhosis. Pt received 1L NS bolus in ED and continued on 1/2 NS as maintenance. SCr peaked at 2.46 on 12/10/24 and then trended down to 0.99 on 12/16/24. On 12/17/24, his SCr began to trend up again to 1.38 and then 1.74 on 12/18/24. Of note, pt did receive one dose of IV Ketorolac 30mg on 12/17/24. Pt also had one recorded fever (T101.2F) on 12/17/24. IVF rate decreased to 30cc/h (lowest possible rate while on PCA pump) as pt appears hypervolemic. Ideally, would like IVF to be discontinued. Awaiting labs this AM. May consider diuresis today based on labs. Monitor labs and UOP. Avoid further NSAIDs or IV contrast studies. Dose meds per eGFR. Pt with MATA iso acute on chronic sickle cell anemia and NSAID use. Baseline SCr <1. Admission labs notable for hgb 6.0, WBC 14k, SCr 1.89. Pt had CTA chest on 12/9 that showed no PE, no acute chest, but showed healed right rib fracture and his chronic cirrhosis. Pt received 1L NS bolus in ED and continued on 1/2 NS as maintenance. SCr peaked at 2.46 on 12/10/24 and then trended down to 0.99 on 12/16/24. On 12/17/24, his SCr began to trend up again to 1.38 and then 1.74 on 12/18/24. Of note, pt did receive one dose of IV Ketorolac 30mg on 12/17/24. Pt also had one recorded fever (T101.2F) on 12/17/24. IVF rate decreased to 30cc/h (lowest possible rate while on PCA pump) as pt appears hypervolemic. Ideally, would like IVF to be discontinued. UOP ~2L/24h. Awaiting labs this AM. May consider diuresis today based on labs. Monitor labs and UOP. Avoid further NSAIDs or IV contrast studies. Dose meds per eGFR.

## 2024-12-19 NOTE — PROGRESS NOTE ADULT - ASSESSMENT
44 year old male with history of sickle cell anemia presenting today s/p mechanical fall    Sickle cell disease  - follows with Dr Jordan of [facility]  - Hgb 4.9 on 12/13, s/p 1u PRBCs 12/18/24. --> Hgb 6.8  - chronic hemolysis, Iron overload  - pls limit transfusions, maintain Hgb 5.5  - Continue Jadenu, maximize pain control  - ongoing care after discharge  - continues to experience back pain, on Dilaudid PCA pump  - monitor sxs and cbc closely.     Fever  - CXR clear. CTA negative earlier in hospitalization for PE.   - Blood cultures sent, prelim Cx negative  - No fevers since 12/17/24    Fall  -s/p mechanical fall, CTH negative  -admitted with rib pain and SOB on ambulation  -CTA without pulmonary embolism  -Defer pain management to medicine team    will continue to follow    Chantal Patel NP  Hematology/Oncology  [facility]  [phone] (office)   Evenings and weekends please call MD on call or office

## 2024-12-19 NOTE — PROGRESS NOTE ADULT - PROBLEM SELECTOR PLAN 1
Fever to 101.2 at 21:35 on 12/17/24. Reported to have O2 sat in 80s at that time but patient denied dyspnea, since then patient is back on RA satting well. No other new symptoms at time of fever (see chart note). CXR clear. CTA negative earlier in hospitalization for PE. No dysuria, rash or other focal infectious symptoms. Recent staph epi bacteremia during November admission here, seen by ID at that time. Patient has port in place, not appearing acutely infected. No sore throat, cough or other URI symptoms.   - Blood cultures sent, prelim Cx negative  - Lactate nl. New leukocytosis to 12  - No fevers since  - ID consulted, recs appreciated. If further fever, plan to start meropenem.

## 2024-12-19 NOTE — PROGRESS NOTE ADULT - SUBJECTIVE AND OBJECTIVE BOX
Patient is a 44y old  Male [facility] a [facility] (19 Dec 2024 11:15)    SUBJECTIVE / OVERNIGHT EVENTS: No acute events. Feels significantly better than yesterday, more energy, no jitters. No fevers or chills. Still with sickle cell pain.     MEDICATIONS  (STANDING):  allopurinol 50 milliGRAM(s) Oral daily  chlorhexidine 2% Cloths 1 Application(s) Topical daily  enoxaparin Injectable 40 milliGRAM(s) SubCutaneous every 24 hours  folic acid 1 milliGRAM(s) Oral every 24 hours  HYDROmorphone PCA (5 mG/mL) 30 milliLiter(s) PCA Continuous PCA Continuous  influenza   Vaccine 0.5 milliLiter(s) IntraMuscular once  mupirocin 2% Nasal 1 Application(s) Both Nostrils two times a day  NIFEdipine XL 30 milliGRAM(s) Oral daily  pantoprazole    Tablet 40 milliGRAM(s) Oral before breakfast  sodium chloride 0.45%. 1000 milliLiter(s) (30 mL/Hr) IV Continuous <Continuous>    MEDICATIONS  (PRN):  naloxone Injectable 0.1 milliGRAM(s) IV Push once PRN Opioid Overdose      CAPILLARY BLOOD GLUCOSE        I&O's Summary    18 Dec 2024 07:01  -  19 Dec 2024 07:00  --------------------------------------------------------  IN: 1145 mL / OUT: 1960 mL / NET: -815 mL        PHYSICAL EXAM:  Vital Signs Last 24 Hrs  T(C): 37.1 (19 Dec 2024 07:20), Max: 37.1 (19 Dec 2024 07:20)  T(F): 98.8 (19 Dec 2024 07:20), Max: 98.8 (19 Dec 2024 07:20)  HR: 106 (19 Dec 2024 07:20) (89 - 106)  BP: 132/80 (19 Dec 2024 07:20) (120/63 - 133/71)  BP(mean): --  RR: 18 (19 Dec 2024 07:20) (17 - 18)  SpO2: 98% (19 Dec 2024 07:20) (98% - 100%)    Parameters below as of 19 Dec 2024 07:20  Patient On (Oxygen Delivery Method): nasal cannula  O2 Flow (L/min): 3    CONSTITUTIONAL: NAD, sitting up in bed  ENMT: Moist oral mucosa  RESPIRATORY: Normal respiratory effort; lungs are clear to auscultation bilaterally  CARDIOVASCULAR: Regular rate and rhythm, normal S1 and S2, pitting bilateral lower extremity edema  ABDOMEN: Nontender to palpation, normoactive bowel sounds, no rebound/guarding  PSYCH: calm    LABS:                        5.6    12.00 )-----------( 311      ( 18 Dec 2024 05:00 )             16.1     12-19    138  |  102  |  29[H]  ----------------------------<  118[H]  4.0   |  23  |  1.14    Ca    8.8      19 Dec 2024 12:43  Phos  2.8     12-19  Mg     1.30     12-19    TPro  6.8  /  Alb  3.6  /  TBili  6.6[H]  /  DBili  x   /  AST  141[H]  /  ALT  68[H]  /  AlkPhos  166[H]  12-19    Urinalysis Basic - ( 19 Dec 2024 12:43 )    Color: x / Appearance: x / SG: x / pH: x  Gluc: 118 mg/dL / Ketone: x  / Bili: x / Urobili: x   Blood: x / Protein: x / Nitrite: x   Leuk Esterase: x / RBC: x / WBC x   Sq Epi: x / Non Sq Epi: x / Bacteria: x    Culture - Blood (collected 17 Dec 2024 21:40)  Source: .Blood BLOOD  Preliminary Report (19 Dec 2024 03:02):    No growth at 24 hours    Culture - Blood (collected 17 Dec 2024 21:05)  Source: .Blood BLOOD  Preliminary Report (19 Dec 2024 03:02):    No growth at 24 hours    RADIOLOGY & ADDITIONAL TESTS: Reviewed    COORDINATION OF CARE:  Care Discussed with Consultants/Other Providers [Y- Medicine ACP, CM, SW, RN]

## 2024-12-20 LAB
ALBUMIN SERPL ELPH-MCNC: 3.5 G/DL — SIGNIFICANT CHANGE UP (ref 3.3–5)
ALP SERPL-CCNC: 161 U/L — HIGH (ref 40–120)
ALT FLD-CCNC: 81 U/L — HIGH (ref 4–41)
ANION GAP SERPL CALC-SCNC: 12 MMOL/L — SIGNIFICANT CHANGE UP (ref 7–14)
AST SERPL-CCNC: 140 U/L — HIGH (ref 4–40)
BILIRUB SERPL-MCNC: 9.6 MG/DL — HIGH (ref 0.2–1.2)
BUN SERPL-MCNC: 31 MG/DL — HIGH (ref 7–23)
CALCIUM SERPL-MCNC: 8.6 MG/DL — SIGNIFICANT CHANGE UP (ref 8.4–10.5)
CHLORIDE SERPL-SCNC: 99 MMOL/L — SIGNIFICANT CHANGE UP (ref 98–107)
CO2 SERPL-SCNC: 22 MMOL/L — SIGNIFICANT CHANGE UP (ref 22–31)
CREAT SERPL-MCNC: 1.04 MG/DL — SIGNIFICANT CHANGE UP (ref 0.5–1.3)
EGFR: 90 ML/MIN/1.73M2 — SIGNIFICANT CHANGE UP
GLUCOSE SERPL-MCNC: 105 MG/DL — HIGH (ref 70–99)
HCT VFR BLD CALC: 18.4 % — CRITICAL LOW (ref 39–50)
HGB BLD-MCNC: 6.5 G/DL — CRITICAL LOW (ref 13–17)
LACTATE SERPL-SCNC: 0.7 MMOL/L — SIGNIFICANT CHANGE UP (ref 0.5–2)
MAGNESIUM SERPL-MCNC: 1.5 MG/DL — LOW (ref 1.6–2.6)
MCHC RBC-ENTMCNC: 30 PG — SIGNIFICANT CHANGE UP (ref 27–34)
MCHC RBC-ENTMCNC: 35.3 G/DL — SIGNIFICANT CHANGE UP (ref 32–36)
MCV RBC AUTO: 84.8 FL — SIGNIFICANT CHANGE UP (ref 80–100)
NRBC # BLD: 0 /100 WBCS — SIGNIFICANT CHANGE UP (ref 0–0)
NRBC # FLD: 0.08 K/UL — HIGH (ref 0–0)
PHOSPHATE SERPL-MCNC: 3.1 MG/DL — SIGNIFICANT CHANGE UP (ref 2.5–4.5)
PLATELET # BLD AUTO: 299 K/UL — SIGNIFICANT CHANGE UP (ref 150–400)
POTASSIUM SERPL-MCNC: 4.5 MMOL/L — SIGNIFICANT CHANGE UP (ref 3.5–5.3)
POTASSIUM SERPL-SCNC: 4.5 MMOL/L — SIGNIFICANT CHANGE UP (ref 3.5–5.3)
PROT SERPL-MCNC: 6.6 G/DL — SIGNIFICANT CHANGE UP (ref 6–8.3)
RBC # BLD: 2.17 M/UL — LOW (ref 4.2–5.8)
RBC # FLD: 20.2 % — HIGH (ref 10.3–14.5)
SODIUM SERPL-SCNC: 133 MMOL/L — LOW (ref 135–145)
WBC # BLD: 14.08 K/UL — HIGH (ref 3.8–10.5)
WBC # FLD AUTO: 14.08 K/UL — HIGH (ref 3.8–10.5)

## 2024-12-20 PROCEDURE — 99232 SBSQ HOSP IP/OBS MODERATE 35: CPT | Mod: GC

## 2024-12-20 PROCEDURE — 71045 X-RAY EXAM CHEST 1 VIEW: CPT | Mod: 26

## 2024-12-20 PROCEDURE — 99232 SBSQ HOSP IP/OBS MODERATE 35: CPT

## 2024-12-20 PROCEDURE — 99233 SBSQ HOSP IP/OBS HIGH 50: CPT

## 2024-12-20 RX ORDER — MEROPENEM 1 G/20ML
1000 INJECTION, POWDER, FOR SOLUTION INTRAVENOUS EVERY 8 HOURS
Refills: 0 | Status: DISCONTINUED | OUTPATIENT
Start: 2024-12-20 | End: 2024-12-24

## 2024-12-20 RX ORDER — ONDANSETRON 4 MG/1
4 TABLET ORAL EVERY 8 HOURS
Refills: 0 | Status: DISCONTINUED | OUTPATIENT
Start: 2024-12-20 | End: 2025-01-03

## 2024-12-20 RX ORDER — KETOROLAC TROMETHAMINE 30 MG/ML
30 INJECTION INTRAMUSCULAR; INTRAVENOUS ONCE
Refills: 0 | Status: DISCONTINUED | OUTPATIENT
Start: 2024-12-20 | End: 2024-12-20

## 2024-12-20 RX ORDER — FUROSEMIDE 20 MG
40 TABLET ORAL ONCE
Refills: 0 | Status: DISCONTINUED | OUTPATIENT
Start: 2024-12-20 | End: 2024-12-20

## 2024-12-20 RX ORDER — MEROPENEM 1 G/20ML
1000 INJECTION, POWDER, FOR SOLUTION INTRAVENOUS EVERY 24 HOURS
Refills: 0 | Status: DISCONTINUED | OUTPATIENT
Start: 2024-12-20 | End: 2024-12-20

## 2024-12-20 RX ORDER — HYDROMORPHONE HCL 4 MG
30 TABLET ORAL
Refills: 0 | Status: DISCONTINUED | OUTPATIENT
Start: 2024-12-20 | End: 2024-12-26

## 2024-12-20 RX ORDER — ACETAMINOPHEN 80 MG/.8ML
1000 SOLUTION/ DROPS ORAL ONCE
Refills: 0 | Status: COMPLETED | OUTPATIENT
Start: 2024-12-20 | End: 2024-12-20

## 2024-12-20 RX ADMIN — ACETAMINOPHEN 400 MILLIGRAM(S): 80 SOLUTION/ DROPS ORAL at 15:30

## 2024-12-20 RX ADMIN — NIFEDIPINE 30 MILLIGRAM(S): 60 TABLET, EXTENDED RELEASE ORAL at 06:45

## 2024-12-20 RX ADMIN — Medication 30 MILLILITER(S): at 03:25

## 2024-12-20 RX ADMIN — Medication 30 MILLILITER(S): at 08:37

## 2024-12-20 RX ADMIN — ONDANSETRON 4 MILLIGRAM(S): 4 TABLET ORAL at 15:27

## 2024-12-20 RX ADMIN — Medication 30 MILLILITER(S): at 12:01

## 2024-12-20 RX ADMIN — ENOXAPARIN SODIUM 40 MILLIGRAM(S): 60 INJECTION INTRAVENOUS; SUBCUTANEOUS at 17:40

## 2024-12-20 RX ADMIN — CHLORHEXIDINE GLUCONATE 1 APPLICATION(S): 1.2 RINSE ORAL at 11:49

## 2024-12-20 RX ADMIN — Medication 30 MILLILITER(S): at 19:35

## 2024-12-20 RX ADMIN — MEROPENEM 100 MILLIGRAM(S): 1 INJECTION, POWDER, FOR SOLUTION INTRAVENOUS at 22:06

## 2024-12-20 RX ADMIN — PANTOPRAZOLE 40 MILLIGRAM(S): 40 TABLET, DELAYED RELEASE ORAL at 06:46

## 2024-12-20 RX ADMIN — SODIUM CHLORIDE 30 MILLILITER(S): 9 INJECTION, SOLUTION INTRAVENOUS at 21:02

## 2024-12-20 RX ADMIN — ALLOPURINOL 50 MILLIGRAM(S): 100 TABLET ORAL at 13:49

## 2024-12-20 RX ADMIN — MEROPENEM 100 MILLIGRAM(S): 1 INJECTION, POWDER, FOR SOLUTION INTRAVENOUS at 14:40

## 2024-12-20 RX ADMIN — Medication 1 MILLIGRAM(S): at 13:50

## 2024-12-20 RX ADMIN — KETOROLAC TROMETHAMINE 30 MILLIGRAM(S): 30 INJECTION INTRAMUSCULAR; INTRAVENOUS at 06:46

## 2024-12-20 RX ADMIN — MEROPENEM 100 MILLIGRAM(S): 1 INJECTION, POWDER, FOR SOLUTION INTRAVENOUS at 06:46

## 2024-12-20 NOTE — PROGRESS NOTE ADULT - ASSESSMENT
44 year old male with history of sickle cell anemia presenting today s/p mechanical fall    Sickle cell disease  - follows with Dr Jordan of [facility]  - Hgb 4.9 on 12/13, s/p 1u PRBCs 12/18/24. --> Hgb 6.5 today  - chronic hemolysis, Iron overload  - pls limit transfusions, maintain Hgb 5.5  - Continue Jadenu, maximize pain control  - ongoing care after discharge  - continues to experience back pain, on Dilaudid PCA pump  - monitor sxs and cbc closely.     Fever  - CXR clear. CTA negative earlier in hospitalization for PE.   - Blood cultures 12/17 no growth.  - Febrile 12/20/24 102.3  - 12/17/24 Blood cultures negative x 2 at 48 hrs  - Started meropenem at 6:46am 12/20/24. F/u additional ID recs.    Fall  -s/p mechanical fall, CTH negative  -admitted with rib pain and SOB on ambulation  -CTA without pulmonary embolism  -Defer pain management to medicine team    will continue to follow    Chantal Patel NP  Hematology/Oncology  [facility]  [phone] (office)   Evenings and weekends please call MD on call or office

## 2024-12-20 NOTE — PROGRESS NOTE ADULT - SUBJECTIVE AND OBJECTIVE BOX
Patient is a 44y old  Male who presents with a chief complaint of mech fall, pain, dyspnea (20 Dec 2024 11:45)      MEDICATIONS  (STANDING):  acetaminophen   IVPB .. 1000 milliGRAM(s) IV Intermittent once  allopurinol 50 milliGRAM(s) Oral daily  chlorhexidine 2% Cloths 1 Application(s) Topical daily  enoxaparin Injectable 40 milliGRAM(s) SubCutaneous every 24 hours  folic acid 1 milliGRAM(s) Oral every 24 hours  HYDROmorphone PCA (5 mG/mL) 30 milliLiter(s) PCA Continuous PCA Continuous  influenza   Vaccine 0.5 milliLiter(s) IntraMuscular once  meropenem  IVPB 1000 milliGRAM(s) IV Intermittent every 8 hours  NIFEdipine XL 30 milliGRAM(s) Oral daily  pantoprazole    Tablet 40 milliGRAM(s) Oral before breakfast  sodium chloride 0.45%. 1000 milliLiter(s) (30 mL/Hr) IV Continuous <Continuous>    MEDICATIONS  (PRN):  naloxone Injectable 0.1 milliGRAM(s) IV Push once PRN Opioid Overdose  ondansetron Injectable 4 milliGRAM(s) IV Push every 8 hours PRN Nausea and/or Vomiting    Vital Signs Last 24 Hrs  T(C): 36.9 (20 Dec 2024 08:53), Max: 39.1 (20 Dec 2024 04:53)  T(F): 98.4 (20 Dec 2024 08:53), Max: 102.3 (20 Dec 2024 04:53)  HR: 84 (20 Dec 2024 08:53) (84 - 126)  BP: 110/71 (20 Dec 2024 08:53) (110/71 - 152/77)  BP(mean): --  RR: 18 (20 Dec 2024 08:53) (17 - 18)  SpO2: 94% (20 Dec 2024 08:53) (94% - 100%)    Parameters below as of 20 Dec 2024 04:53  Patient On (Oxygen Delivery Method): nasal cannula  O2 Flow (L/min): 3      PE  NAD  Awake, alert  Anicteric, MMM  RRR  CTAB  Abd soft, NT, ND                          6.5    14.08 )-----------( 299      ( 20 Dec 2024 05:39 )             18.4       12-20    133[L]  |  99  |  31[H]  ----------------------------<  105[H]  4.5   |  22  |  1.04    Ca    8.6      20 Dec 2024 05:39  Phos  3.1     12-20  Mg     1.50     12-20    TPro  6.6  /  Alb  3.5  /  TBili  9.6[H]  /  DBili  x   /  AST  140[H]  /  ALT  81[H]  /  AlkPhos  161[H]  12-20

## 2024-12-20 NOTE — PROGRESS NOTE ADULT - PROBLEM SELECTOR PLAN 2
Patient presents with L rib and back pain s/p trauma  Patient reports hg baseline is ~ 5.5  - C/w dilaudid PCA, monitor pain- improving, reduced PCA today, continue to wean as able  - Bowel regimen with Senna daily, miralax prn  -- Patient follows Dr Jordan of [facility] - appreciate hem/onc input here, s/p 2 unit pRBC this admission. Trend Hg.   - Pt on Jadenu at home, discussed with Heme onc, hold at this time, plan to resume at time of discharge   - no signs of acute chest- addressed below  12/16- experienced feelings of restlessness/ concern for possible opioid withdrawal- since improved. No longer feeling jittery or restless. Clinically without withdrawal symptoms. CTM.

## 2024-12-20 NOTE — PROGRESS NOTE ADULT - PROBLEM SELECTOR PLAN 1
Fever to 101.2 at 21:35 on 12/17/24. Reported to have O2 sat in 80s at that time but patient denied dyspnea, since then patient is back on RA satting well. No other new symptoms at time of fever (see chart note). CXR clear. CTA negative earlier in hospitalization for PE. No dysuria, rash or other focal infectious symptoms. Recent staph epi bacteremia during November admission here, seen by ID at that time. Patient has port in place, not appearing acutely infected. No sore throat, cough or other URI symptoms.   - 12/17/24 Blood cultures negative x 2 at 48 hrs  - Lactate nl. Continued leukocytosis up to 14  - ID consulted, recs appreciated. No new focal infectious symptoms. Started meropenem at 6:46am 12/20/24. F/u additional ID recs.

## 2024-12-20 NOTE — PROGRESS NOTE ADULT - SUBJECTIVE AND OBJECTIVE BOX
Follow Up:      Interval History/ROS:  spiked fever at 5AM this morning       blood cultures drawn  started meropenem     feels ok now     denies HA, cough, abdominal pain       Allergies  penicillin (Pruritus)  hydroxyurea (Other)  ceftriaxone (Anaphylaxis)  piperacillin-tazobactam (Urticaria)        ANTIMICROBIALS:  meropenem  IVPB 1000 every 8 hours    MEDICATIONS  (STANDING):  allopurinol 50 daily  enoxaparin Injectable 40 every 24 hours  HYDROmorphone PCA (5 mG/mL) 30 PCA Continuous  influenza   Vaccine 0.5 once  NIFEdipine XL 30 daily  ondansetron Injectable 4 every 8 hours PRN  pantoprazole    Tablet 40 before breakfast      Vital Signs Last 24 Hrs  T(F): 98.4 (12-20-24 @ 08:53), Max: 102.3 (12-20-24 @ 04:53)  HR: 84 (12-20-24 @ 08:53)  BP: 110/71 (12-20-24 @ 08:53)  RR: 18 (12-20-24 @ 08:53)  SpO2: 94% (12-20-24 @ 08:53) (94% - 100%)      PHYSICAL EXAM:  Constitutional: Not in acute distress  Eyes: No icterus.  Oral cavity: Clear, no lesions  Neck: Supple  RS: Chest clear  nasal O2  CVS: S1, S2   Abdomen: Soft. No guarding/rigidity/tenderness.  Extremities: edema feet bilat  Vascular: port right chest  iv left arm   Neuro: Alert, oriented to time/place/person  Cranial nerves 2-12 grossly normal. No focal abnormalities                                     6.5    14.08 )-----------( 299      ( 20 Dec 2024 05:39 )             18.4 12-20    133  |  99  |  31  ----------------------------<  105  4.5   |  22  |  1.04  Ca    8.6      20 Dec 2024 05:39Phos  3.1     12-20Mg     1.50     12-20  TPro  6.6  /  Alb  3.5  /  TBili  9.6  /  DBili  x   /  AST  140  /  ALT  81  /  AlkPhos  161  12-20        Urine Microscopic-Add On (NC) (11.04.24 @ 00:31)   Cast: 1 /LPF  Epithelial Cells: 1 /HPF  White Blood Cell - Urine: 1 /HPF  Bacteria: Negative /HPF  Red Blood Cell - Urine: 4 /HPF      MICROBIOLOGY:    12/20 blood cultures testing     .Blood BLOOD  12-17-24   No growth at 48 hours  --  --      .Blood BLOOD  12-17-24   No growth at 48 hours  --  --          Rapid RVP Result: NotDetec (12-18 @ 10:09)        RADIOLOGY:        rad< from: Xray Chest 1 View- PORTABLE-Urgent (Xray Chest 1 View- PORTABLE-Urgent .) (12.20.24 @ 05:37) >    ACC: [id number] EXAM:  XR CHEST PORTABLE URGENT 1V   ORDERED BY: SHEILA THOMAS     PROCEDURE DATE:  12/20/2024          INTERPRETATION:  CLINICAL INFORMATION: Fever    TIME OF EXAMINATION: December 20, 2024 at 5:29 AM    EXAM: Portable chest    FINDINGS:    Right-sided medication port with tip in the SVC.  Lungs are free of focal consolidations.  Cardiomegaly despite projection.  No pneumothorax.        COMPARISON: December 17        IMPRESSION: No focal abnormality to account for fever.    ---End of Report ---            DEREK JUAREZ MD; Attending Radiologist  This document has been electronically signed. Dec 20 2024  8:23AM    < end of copied text >

## 2024-12-20 NOTE — CHART NOTE - NSCHARTNOTEFT_GEN_A_CORE
Notified by RN that patient is tachycardic to 120s and noted with fever of 102.3.     Patient is 45 yo man with sickle cell disease admitted 12/9 after mechanical fall. and had h/o staph epi bacteremia Oct 2024  Patient developed fever on 12 /17 without any source identified. ID was following, recommended to monitor off antibiotics and if getting any more fevers , can  start meropenam.   Recommended to draw repeat blood cultures,  and lactate .   Ordered iv toradol for fever.   Urgent CXR ordered, endorsed to day Acp to f/u   Started on meropenam as per ID recommendations

## 2024-12-20 NOTE — PROGRESS NOTE ADULT - ASSESSMENT
45 yo man with sickle cell disease admitted 12/9 after mechanical fall.    h/o staph epi bacteremia Oct 2024  port in place      noted to be anemic; received a blood transfusion 12/13 and 12/18    CTA no PE      Fever 101 on 12/17   blood cultures no growth x 48 h   UA -  full RVP -    Fever 102.3 on 12/20  blood cultures repeated and started meropenem   CXR neg    Intermittent fever   no focus of infection on exam   port w/o signs of infection   ?fever related to hemolysis     Blood cultures 12/20 testing     Meropenem 12/20-    h/o PCN allergy     Suggest:  follow cultures   continue meropenem for now     ID service available over weekend

## 2024-12-20 NOTE — PROGRESS NOTE ADULT - NS ATTEND AMEND GEN_ALL_CORE FT
Agree with above assessment and plan.   Fever unlikely related to hemolysis. ID follow up.   Hemoglobin stable. Agree with above assessment and plan.   Fever unlikely related to hemolysis. ID follow up.   Hemoglobin stable. Hemolysis labs have improved. O2 requirement unchanged. CXR clear

## 2024-12-20 NOTE — PROGRESS NOTE ADULT - ATTENDING COMMENTS
MATA resolved   again received Toradol.   otherwise consider diuresis  Renal service will sign off at this time. please re-consult if needed   discussed with attending to alert night team to NOT give NSAIDs
very edematous -->start Lasix 40mg IV daily

## 2024-12-20 NOTE — PROGRESS NOTE ADULT - SUBJECTIVE AND OBJECTIVE BOX
Patient is a 44y old  Male who presents with a chief complaint of mech fall, pain, dyspnea (20 Dec 2024 09:56)    SUBJECTIVE / OVERNIGHT EVENTS: Febrile to 102.3 at 4:53 am. Patient reports feeling warm at that time but no other symptoms. No sore throat, runny nose, cough, shortness of breath, rash, bleeding, dysuria.     MEDICATIONS  (STANDING):  allopurinol 50 milliGRAM(s) Oral daily  chlorhexidine 2% Cloths 1 Application(s) Topical daily  enoxaparin Injectable 40 milliGRAM(s) SubCutaneous every 24 hours  folic acid 1 milliGRAM(s) Oral every 24 hours  HYDROmorphone PCA (5 mG/mL) 30 milliLiter(s) PCA Continuous PCA Continuous  influenza   Vaccine 0.5 milliLiter(s) IntraMuscular once  meropenem  IVPB 1000 milliGRAM(s) IV Intermittent every 24 hours  NIFEdipine XL 30 milliGRAM(s) Oral daily  pantoprazole    Tablet 40 milliGRAM(s) Oral before breakfast  sodium chloride 0.45%. 1000 milliLiter(s) (30 mL/Hr) IV Continuous <Continuous>    MEDICATIONS  (PRN):  naloxone Injectable 0.1 milliGRAM(s) IV Push once PRN Opioid Overdose      CAPILLARY BLOOD GLUCOSE        I&O's Summary    20 Dec 2024 07:01  -  20 Dec 2024 11:46  --------------------------------------------------------  IN: 0 mL / OUT: 950 mL / NET: -950 mL        PHYSICAL EXAM:  Vital Signs Last 24 Hrs  T(C): 36.9 (20 Dec 2024 08:53), Max: 39.1 (20 Dec 2024 04:53)  T(F): 98.4 (20 Dec 2024 08:53), Max: 102.3 (20 Dec 2024 04:53)  HR: 84 (20 Dec 2024 08:53) (84 - 126)  BP: 110/71 (20 Dec 2024 08:53) (110/71 - 152/77)  BP(mean): --  RR: 18 (20 Dec 2024 08:53) (16 - 18)  SpO2: 94% (20 Dec 2024 08:53) (94% - 100%)    Parameters below as of 20 Dec 2024 04:53  Patient On (Oxygen Delivery Method): nasal cannula  O2 Flow (L/min): 3    CONSTITUTIONAL: NAD, sitting up in bed  ENMT: Moist oral mucosa  RESPIRATORY: Normal respiratory effort; lungs are clear to auscultation bilaterally  CARDIOVASCULAR: Regular rate and rhythm, normal S1 and S2, pitting b/l lower extremity edema  ABDOMEN: Nontender to palpation, normoactive bowel sounds, no rebound/guarding  PSYCH: calm    LABS:                        6.5    14.08 )-----------( 299      ( 20 Dec 2024 05:39 )             18.4     12-20    133[L]  |  99  |  31[H]  ----------------------------<  105[H]  4.5   |  22  |  1.04    Ca    8.6      20 Dec 2024 05:39  Phos  3.1     12-20  Mg     1.50     12-20    TPro  6.6  /  Alb  3.5  /  TBili  9.6[H]  /  DBili  x   /  AST  140[H]  /  ALT  81[H]  /  AlkPhos  161[H]  12-20    Urinalysis Basic - ( 20 Dec 2024 05:39 )    Color: x / Appearance: x / SG: x / pH: x  Gluc: 105 mg/dL / Ketone: x  / Bili: x / Urobili: x   Blood: x / Protein: x / Nitrite: x   Leuk Esterase: x / RBC: x / WBC x   Sq Epi: x / Non Sq Epi: x / Bacteria: x    Culture - Blood (collected 17 Dec 2024 21:40)  Source: .Blood BLOOD  Preliminary Report (20 Dec 2024 03:01):    No growth at 48 Hours    Culture - Blood (collected 17 Dec 2024 21:05)  Source: .Blood BLOOD  Preliminary Report (20 Dec 2024 03:01):    No growth at 48 Hours    RADIOLOGY & ADDITIONAL TESTS: Reviewed    COORDINATION OF CARE:  Care Discussed with Consultants/Other Providers [Y- Medicine ACP, Renal, RN, CM, SW]

## 2024-12-20 NOTE — PROGRESS NOTE ADULT - SUBJECTIVE AND OBJECTIVE BOX
[facility] DIVISION OF KIDNEY DISEASES AND HYPERTENSION --    Chief Complaint: MATA    24 hour events/subjective: Patient seen and examined at bedside. Had fever with T 102.3F this AM. No SOB. Pain improving, still on PCA pump. Non-oliguric      PAST HISTORY  --------------------------------------------------------------------------------  No significant changes to PMH, PSH, FHx, SHx, unless otherwise noted    ALLERGIES & MEDICATIONS  --------------------------------------------------------------------------------  Allergies    penicillin (Pruritus)  hydroxyurea (Other)  ceftriaxone (Anaphylaxis)  piperacillin-tazobactam (Urticaria)      Standing Inpatient Medications  allopurinol 50 milliGRAM(s) Oral daily  chlorhexidine 2% Cloths 1 Application(s) Topical daily  enoxaparin Injectable 40 milliGRAM(s) SubCutaneous every 24 hours  folic acid 1 milliGRAM(s) Oral every 24 hours  HYDROmorphone PCA (5 mG/mL) 30 milliLiter(s) PCA Continuous PCA Continuous  influenza   Vaccine 0.5 milliLiter(s) IntraMuscular once  meropenem  IVPB 1000 milliGRAM(s) IV Intermittent every 24 hours  NIFEdipine XL 30 milliGRAM(s) Oral daily  pantoprazole    Tablet 40 milliGRAM(s) Oral before breakfast  sodium chloride 0.45%. 1000 milliLiter(s) IV Continuous <Continuous>    PRN Inpatient Medications  naloxone Injectable 0.1 milliGRAM(s) IV Push once PRN      REVIEW OF SYSTEMS  --------------------------------------------------------------------------------  Gen: +fever  Respiratory: No dyspnea  CV: No chest pain  GI: No abdominal pain, diarrhea, nausea, vomiting  : No increased frequency, dysuria, hematuria, nocturia  Skin: No rashes  MSK: +Leg edema, body pain  Neuro: No dizziness/lightheadedness  Heme: No easy bruising or bleeding    All other systems were reviewed and are negative, except as noted.    VITALS/PHYSICAL EXAM  --------------------------------------------------------------------------------  T(C): 39.1 (12-20-24 @ 04:53), Max: 39.1 (12-20-24 @ 04:53)  HR: 126 (12-20-24 @ 04:53) (101 - 126)  BP: 152/77 (12-20-24 @ 04:53) (130/77 - 152/77)  RR: 17 (12-20-24 @ 04:53) (16 - 18)  SpO2: 97% (12-20-24 @ 04:53) (95% - 100%)  Wt(kg): --      PHYSICAL EXAM:  Gen: NAD  Neuro: non-focal  HEENT: Anicteric, No JVD  Pulm: CTA B/L  CV: +S1S2  Abd: soft, non-tender/non-distended  Extremities: ++B/L LE pitting edema  Skin: Warm    LABS/STUDIES  --------------------------------------------------------------------------------              6.5    14.08 >-----------<  299      [12-20-24 @ 05:39]              18.4     133  |  99  |  31  ----------------------------<  105      [12-20-24 @ 05:39]  4.5   |  22  |  1.04        Ca     8.6     [12-20-24 @ 05:39]      Mg     1.50     [12-20-24 @ 05:39]      Phos  3.1     [12-20-24 @ 05:39]    TPro  6.6  /  Alb  3.5  /  TBili  9.6  /  DBili  x   /  AST  140  /  ALT  81  /  AlkPhos  161  [12-20-24 @ 05:39]              [12-19-24 @ 12:43]    Creatinine Trend:  SCr 1.04 [12-20 @ 05:39]  SCr 1.14 [12-19 @ 12:43]  SCr 1.74 [12-18 @ 05:00]  SCr 1.38 [12-17 @ 22:05]  SCr 1.00 [12-17 @ 10:15]      Iron 428, TIBC 541, %sat 79      [10-24-24 @ 23:48]  Ferritin 3189      [10-28-24 @ 06:55]

## 2024-12-20 NOTE — PROGRESS NOTE ADULT - PROBLEM SELECTOR PLAN 1
Pt with MATA iso acute on chronic sickle cell anemia and NSAID use. Baseline SCr <1. Admission labs notable for hgb 6.0, WBC 14k, SCr 1.89. Pt had CTA chest on 12/9 that showed no PE, no acute chest, but showed healed right rib fracture and his chronic cirrhosis. Pt received 1L NS bolus in ED and continued on 1/2 NS as maintenance. SCr peaked at 2.46 on 12/10/24 and then trended down to 0.99 on 12/16/24. On 12/17/24, his SCr began to trend up again to 1.38 and then 1.74 on 12/18/24. Of note, pt did receive one dose of IV Ketorolac 30mg on 12/17/24. IVF rate decreased to 30cc/h (lowest possible rate while on PCA pump) as pt appears hypervolemic. Ideally, would like IVF to be discontinued. UOP in last 24h not recorded, but rports good UOP. Labs reviewed. SCr decreased to 1.04 and SNa low at 133 today. Would give Lasix 40mg IV x1 dose today. Monitor labs and UOP. Avoid further NSAIDs or IV contrast studies. Dose meds per eGFR.

## 2024-12-21 LAB
-  ENTEROBACTERALES: SIGNIFICANT CHANGE UP
ANION GAP SERPL CALC-SCNC: 12 MMOL/L — SIGNIFICANT CHANGE UP (ref 7–14)
BUN SERPL-MCNC: 29 MG/DL — HIGH (ref 7–23)
CALCIUM SERPL-MCNC: 8.7 MG/DL — SIGNIFICANT CHANGE UP (ref 8.4–10.5)
CHLORIDE SERPL-SCNC: 101 MMOL/L — SIGNIFICANT CHANGE UP (ref 98–107)
CO2 SERPL-SCNC: 22 MMOL/L — SIGNIFICANT CHANGE UP (ref 22–31)
CREAT SERPL-MCNC: 0.92 MG/DL — SIGNIFICANT CHANGE UP (ref 0.5–1.3)
EGFR: 105 ML/MIN/1.73M2 — SIGNIFICANT CHANGE UP
GLUCOSE SERPL-MCNC: 98 MG/DL — SIGNIFICANT CHANGE UP (ref 70–99)
GRAM STN FLD: ABNORMAL
HAPTOGLOB SERPL-MCNC: <20 MG/DL — LOW (ref 34–200)
HCT VFR BLD CALC: 17.5 % — CRITICAL LOW (ref 39–50)
HGB BLD-MCNC: 6 G/DL — CRITICAL LOW (ref 13–17)
LDH SERPL L TO P-CCNC: 692 U/L — HIGH (ref 135–225)
MAGNESIUM SERPL-MCNC: 1.5 MG/DL — LOW (ref 1.6–2.6)
MCHC RBC-ENTMCNC: 29.1 PG — SIGNIFICANT CHANGE UP (ref 27–34)
MCHC RBC-ENTMCNC: 34.3 G/DL — SIGNIFICANT CHANGE UP (ref 32–36)
MCV RBC AUTO: 85 FL — SIGNIFICANT CHANGE UP (ref 80–100)
METHOD TYPE: SIGNIFICANT CHANGE UP
NRBC # BLD: 0 /100 WBCS — SIGNIFICANT CHANGE UP (ref 0–0)
NRBC # FLD: 0.05 K/UL — HIGH (ref 0–0)
PHOSPHATE SERPL-MCNC: 3.2 MG/DL — SIGNIFICANT CHANGE UP (ref 2.5–4.5)
PLATELET # BLD AUTO: 287 K/UL — SIGNIFICANT CHANGE UP (ref 150–400)
POTASSIUM SERPL-MCNC: 4.3 MMOL/L — SIGNIFICANT CHANGE UP (ref 3.5–5.3)
POTASSIUM SERPL-SCNC: 4.3 MMOL/L — SIGNIFICANT CHANGE UP (ref 3.5–5.3)
RBC # BLD: 2.06 M/UL — LOW (ref 4.2–5.8)
RBC # BLD: 2.06 M/UL — LOW (ref 4.2–5.8)
RBC # FLD: 20 % — HIGH (ref 10.3–14.5)
RETICS #: 199 K/UL — HIGH (ref 25–125)
RETICS/RBC NFR: 9.7 % — HIGH (ref 0.5–2.5)
SODIUM SERPL-SCNC: 135 MMOL/L — SIGNIFICANT CHANGE UP (ref 135–145)
SPECIMEN SOURCE: SIGNIFICANT CHANGE UP
SPECIMEN SOURCE: SIGNIFICANT CHANGE UP
WBC # BLD: 11.04 K/UL — HIGH (ref 3.8–10.5)
WBC # FLD AUTO: 11.04 K/UL — HIGH (ref 3.8–10.5)

## 2024-12-21 PROCEDURE — 99233 SBSQ HOSP IP/OBS HIGH 50: CPT

## 2024-12-21 PROCEDURE — 71045 X-RAY EXAM CHEST 1 VIEW: CPT | Mod: 26

## 2024-12-21 RX ORDER — FUROSEMIDE 20 MG
20 TABLET ORAL ONCE
Refills: 0 | Status: COMPLETED | OUTPATIENT
Start: 2024-12-21 | End: 2024-12-21

## 2024-12-21 RX ORDER — ACETAMINOPHEN 80 MG/.8ML
1000 SOLUTION/ DROPS ORAL ONCE
Refills: 0 | Status: COMPLETED | OUTPATIENT
Start: 2024-12-21 | End: 2024-12-21

## 2024-12-21 RX ORDER — MAGNESIUM SULFATE 500 MG/ML
1 INJECTION, SOLUTION INTRAMUSCULAR; INTRAVENOUS ONCE
Refills: 0 | Status: COMPLETED | OUTPATIENT
Start: 2024-12-21 | End: 2024-12-21

## 2024-12-21 RX ADMIN — Medication 30 MILLILITER(S): at 07:30

## 2024-12-21 RX ADMIN — ENOXAPARIN SODIUM 40 MILLIGRAM(S): 60 INJECTION INTRAVENOUS; SUBCUTANEOUS at 16:47

## 2024-12-21 RX ADMIN — ONDANSETRON 4 MILLIGRAM(S): 4 TABLET ORAL at 20:43

## 2024-12-21 RX ADMIN — CHLORHEXIDINE GLUCONATE 1 APPLICATION(S): 1.2 RINSE ORAL at 13:40

## 2024-12-21 RX ADMIN — MEROPENEM 100 MILLIGRAM(S): 1 INJECTION, POWDER, FOR SOLUTION INTRAVENOUS at 06:04

## 2024-12-21 RX ADMIN — Medication 20 MILLIGRAM(S): at 14:39

## 2024-12-21 RX ADMIN — Medication 1 MILLIGRAM(S): at 13:41

## 2024-12-21 RX ADMIN — MAGNESIUM SULFATE 100 GRAM(S): 500 INJECTION, SOLUTION INTRAMUSCULAR; INTRAVENOUS at 08:01

## 2024-12-21 RX ADMIN — ACETAMINOPHEN 1000 MILLIGRAM(S): 80 SOLUTION/ DROPS ORAL at 21:35

## 2024-12-21 RX ADMIN — Medication 30 MILLILITER(S): at 19:10

## 2024-12-21 RX ADMIN — MEROPENEM 100 MILLIGRAM(S): 1 INJECTION, POWDER, FOR SOLUTION INTRAVENOUS at 22:00

## 2024-12-21 RX ADMIN — ACETAMINOPHEN 400 MILLIGRAM(S): 80 SOLUTION/ DROPS ORAL at 21:05

## 2024-12-21 RX ADMIN — MEROPENEM 100 MILLIGRAM(S): 1 INJECTION, POWDER, FOR SOLUTION INTRAVENOUS at 13:42

## 2024-12-21 RX ADMIN — PANTOPRAZOLE 40 MILLIGRAM(S): 40 TABLET, DELAYED RELEASE ORAL at 06:06

## 2024-12-21 RX ADMIN — NIFEDIPINE 30 MILLIGRAM(S): 60 TABLET, EXTENDED RELEASE ORAL at 06:06

## 2024-12-21 RX ADMIN — Medication 30 MILLILITER(S): at 16:14

## 2024-12-21 RX ADMIN — ALLOPURINOL 50 MILLIGRAM(S): 100 TABLET ORAL at 13:41

## 2024-12-21 NOTE — PROGRESS NOTE ADULT - ASSESSMENT
44 year old male with history of sickle cell anemia presenting today s/p mechanical fall    Sickle cell disease  - follows with Dr Jordan of [facility]  - Hgb 4.9 on 12/13, s/p 1u PRBCs 12/18/24. --> Hgb 6.0 today with improvement in hemolysis parameters except bilirubin   - chronic hemolysis, Iron overload  - pls limit transfusions, maintain Hgb 5.5  - Continue Jadenu, maximize pain control  - ongoing care after discharge  - continues to experience back pain, on Dilaudid PCA pump  - monitor sxs and cbc closely.     Fever  - CXR clear. CTA negative earlier in hospitalization for PE.   - Blood cultures 12/17 no growth.  - Febrile 12/20/24 102.3  - 12/17/24 Blood cultures negative x 2 at 48 hrs  - Started meropenem at 6:46am 12/20/24. F/u additional ID recs.  - given recurrent fever follow up further ID recs. His hemolysis parameters are improving and unlikely this to be related. CXR is clear. I am concerned however given improvement in other hemolysis labs that his bilirubin continues to rise. Please fractionate and conser biliary source if continues to increase with fevers. May need further imaging of biliary tract. Monitor closely     Fall  -s/p mechanical fall, CTH negative  -admitted with rib pain and SOB on ambulation  -CTA without pulmonary embolism  -Defer pain management to medicine team    will continue to follow    Arash Serrato MD   Hematology/Oncology  [facility]  [phone] (office)   Evenings and weekends please call MD on call or office

## 2024-12-21 NOTE — PROGRESS NOTE ADULT - PROBLEM SELECTOR PLAN 2
Patient presents with L rib and back pain s/p trauma  Patient reports hg baseline is ~ 5.5  - C/w dilaudid PCA, monitor pain- improving, continue to wean as able  - Bowel regimen with Senna daily, miralax prn  -- Patient follows Dr Jordan of [facility] - appreciate hem/onc input here, s/p 2 unit pRBC this admission. Trend Hg.   - Pt on Jadenu at home, discussed with Heme onc, hold at this time, plan to resume at time of discharge   - no signs of acute chest- addressed below  12/16- experienced feelings of restlessness/ concern for possible opioid withdrawal- since improved. No longer feeling jittery or restless. Clinically without withdrawal symptoms. CTM.

## 2024-12-21 NOTE — PROVIDER CONTACT NOTE (OTHER) - ACTION/TREATMENT ORDERED:
RENNY Bhagat aware. Rectal temp obtained. IV tylenol ordered and administered. PRN IVP Zofran given. Chest X-ray ordered. Care continues

## 2024-12-21 NOTE — PROGRESS NOTE ADULT - PROBLEM SELECTOR PLAN 1
Fever to 101.2 at 21:35 on 12/17/24. Reported to have O2 sat in 80s at that time but patient denied dyspnea, since then patient is back on RA satting well. No other new symptoms at time of fever (see chart note). CXR clear. CTA negative earlier in hospitalization for PE. No dysuria, rash or other focal infectious symptoms. Recent staph epi bacteremia during November admission here, seen by ID at that time. Patient has port in place, not appearing acutely infected. No sore throat, cough or other URI symptoms.   - 12/17/24 Blood cultures negative x 2 at 48 hrs  - Lactate nl. Continued leukocytosis up to 14  - ID consulted, recs appreciated. No new focal infectious symptoms. Started meropenem at 6:46am 12/20/24. F/u additional ID recs.  -blood culture with Enterobacterales, f/u with ID

## 2024-12-21 NOTE — PROGRESS NOTE ADULT - SUBJECTIVE AND OBJECTIVE BOX
Patient is a 44y old  Male who presents with a chief complaint of [facility] fall, pain, dyspnea (21 Dec 2024 08:20)      SUBJECTIVE / OVERNIGHT EVENTS: Pt seen and examined at 1:07pm, no overnight events, reports pain is under control, denies any sob or any other complaints.    MEDICATIONS  (STANDING):  allopurinol 50 milliGRAM(s) Oral daily  chlorhexidine 2% Cloths 1 Application(s) Topical daily  enoxaparin Injectable 40 milliGRAM(s) SubCutaneous every 24 hours  folic acid 1 milliGRAM(s) Oral every 24 hours  furosemide   Injectable 20 milliGRAM(s) IV Push once  HYDROmorphone PCA (5 mG/mL) 30 milliLiter(s) PCA Continuous PCA Continuous  influenza   Vaccine 0.5 milliLiter(s) IntraMuscular once  meropenem  IVPB 1000 milliGRAM(s) IV Intermittent every 8 hours  NIFEdipine XL 30 milliGRAM(s) Oral daily  pantoprazole    Tablet 40 milliGRAM(s) Oral before breakfast  sodium chloride 0.45%. 1000 milliLiter(s) (30 mL/Hr) IV Continuous <Continuous>    MEDICATIONS  (PRN):  naloxone Injectable 0.1 milliGRAM(s) IV Push once PRN Opioid Overdose  ondansetron Injectable 4 milliGRAM(s) IV Push every 8 hours PRN Nausea and/or Vomiting      Vital Signs Last 24 Hrs  T(C): 37.2 (21 Dec 2024 11:30), Max: 37.5 (21 Dec 2024 06:01)  T(F): 99 (21 Dec 2024 11:30), Max: 99.5 (21 Dec 2024 06:01)  HR: 110 (21 Dec 2024 11:30) (88 - 110)  BP: 135/69 (21 Dec 2024 11:30) (126/60 - 140/75)  BP(mean): --  RR: 18 (21 Dec 2024 11:30) (17 - 18)  SpO2: 93% (21 Dec 2024 11:30) (93% - 100%)    Parameters below as of 21 Dec 2024 11:30  Patient On (Oxygen Delivery Method): nasal cannula  O2 Flow (L/min): 3    CAPILLARY BLOOD GLUCOSE        I&O's Summary    20 Dec 2024 07:01  -  21 Dec 2024 07:00  --------------------------------------------------------  IN: 720 mL / OUT: 2550 mL / NET: -1830 mL        PHYSICAL EXAM:  CONSTITUTIONAL: NAD  RESPIRATORY: Normal respiratory effort; lungs are clear to auscultation bilaterally  CARDIOVASCULAR: Regular rate and rhythm, normal S1 and S2, pitting b/l lower extremity edema+  ABDOMEN: Soft, Nontender to palpation, nondistended  EXTREMITIES:L b/l LE edema+  PSYCH: calm  NEURO: AA answers questions appropriately    LABS:                        6.0    11.04 )-----------( 287      ( 21 Dec 2024 04:11 )             17.5     12-21    135  |  101  |  29[H]  ----------------------------<  98  4.3   |  22  |  0.92    Ca    8.7      21 Dec 2024 04:11  Phos  3.2     12-21  Mg     1.50     12-21    TPro  6.6  /  Alb  3.5  /  TBili  9.6[H]  /  DBili  x   /  AST  140[H]  /  ALT  81[H]  /  AlkPhos  161[H]  12-20          Urinalysis Basic - ( 21 Dec 2024 04:11 )    Color: x / Appearance: x / SG: x / pH: x  Gluc: 98 mg/dL / Ketone: x  / Bili: x / Urobili: x   Blood: x / Protein: x / Nitrite: x   Leuk Esterase: x / RBC: x / WBC x   Sq Epi: x / Non Sq Epi: x / Bacteria: x        RADIOLOGY & ADDITIONAL TESTS:    Imaging Personally Reviewed:    Consultant(s) Notes Reviewed:      Care Discussed with Consultants/Other Providers:

## 2024-12-21 NOTE — PROGRESS NOTE ADULT - PROBLEM SELECTOR PLAN 8
DVT prop: lovenox. Elevated risk of DVT given SCC. No signs of bleeding.   low Na diet    Hypomagnesemia- replete mag    Plan discussed with ACP

## 2024-12-21 NOTE — PROGRESS NOTE ADULT - SUBJECTIVE AND OBJECTIVE BOX
INTERVAL HPI/OVERNIGHT EVENTS:  Patient S&E at bedside. No o/n events,     VITAL SIGNS:  T(F): 98.6 (12-21-24 @ 07:30)  HR: 96 (12-21-24 @ 07:30)  BP: 135/75 (12-21-24 @ 07:30)  RR: 18 (12-21-24 @ 07:30)  SpO2: 100% (12-21-24 @ 07:30)  Wt(kg): --    PE  NAD  Awake, alert  Anicteric, MMM  RRR  CTAB  Abd soft, NT, ND      MEDICATIONS  (STANDING):  allopurinol 50 milliGRAM(s) Oral daily  chlorhexidine 2% Cloths 1 Application(s) Topical daily  enoxaparin Injectable 40 milliGRAM(s) SubCutaneous every 24 hours  folic acid 1 milliGRAM(s) Oral every 24 hours  HYDROmorphone PCA (5 mG/mL) 30 milliLiter(s) PCA Continuous PCA Continuous  influenza   Vaccine 0.5 milliLiter(s) IntraMuscular once  meropenem  IVPB 1000 milliGRAM(s) IV Intermittent every 8 hours  NIFEdipine XL 30 milliGRAM(s) Oral daily  pantoprazole    Tablet 40 milliGRAM(s) Oral before breakfast  sodium chloride 0.45%. 1000 milliLiter(s) (30 mL/Hr) IV Continuous <Continuous>    MEDICATIONS  (PRN):  naloxone Injectable 0.1 milliGRAM(s) IV Push once PRN Opioid Overdose  ondansetron Injectable 4 milliGRAM(s) IV Push every 8 hours PRN Nausea and/or Vomiting      Allergies    penicillin (Pruritus)  hydroxyurea (Other)  ceftriaxone (Anaphylaxis)  piperacillin-tazobactam (Urticaria)    Intolerances        LABS:                        6.0    11.04 )-----------( 287      ( 21 Dec 2024 04:11 )             17.5     12-21    135  |  101  |  29[H]  ----------------------------<  98  4.3   |  22  |  0.92    Ca    8.7      21 Dec 2024 04:11  Phos  3.2     12-21  Mg     1.50     12-21    TPro  6.6  /  Alb  3.5  /  TBili  9.6[H]  /  DBili  x   /  AST  140[H]  /  ALT  81[H]  /  AlkPhos  161[H]  12-20      Urinalysis Basic - ( 21 Dec 2024 04:11 )    Color: x / Appearance: x / SG: x / pH: x  Gluc: 98 mg/dL / Ketone: x  / Bili: x / Urobili: x   Blood: x / Protein: x / Nitrite: x   Leuk Esterase: x / RBC: x / WBC x   Sq Epi: x / Non Sq Epi: x / Bacteria: x        RADIOLOGY & ADDITIONAL TESTS:  Studies reviewed.

## 2024-12-22 LAB
-  AMOXICILLIN/CLAVULANIC ACID: SIGNIFICANT CHANGE UP
-  AMPICILLIN/SULBACTAM: SIGNIFICANT CHANGE UP
-  AMPICILLIN: SIGNIFICANT CHANGE UP
-  AZTREONAM: SIGNIFICANT CHANGE UP
-  CEFAZOLIN: SIGNIFICANT CHANGE UP
-  CEFEPIME: SIGNIFICANT CHANGE UP
-  CEFOTAXIME: SIGNIFICANT CHANGE UP
-  CEFOXITIN: SIGNIFICANT CHANGE UP
-  CEFTAZIDIME: SIGNIFICANT CHANGE UP
-  CEFTRIAXONE: SIGNIFICANT CHANGE UP
-  CEFUROXIME: SIGNIFICANT CHANGE UP
-  CIPROFLOXACIN: SIGNIFICANT CHANGE UP
-  ERTAPENEM: SIGNIFICANT CHANGE UP
-  GENTAMICIN: SIGNIFICANT CHANGE UP
-  LEVOFLOXACIN: SIGNIFICANT CHANGE UP
-  MEROPENEM: SIGNIFICANT CHANGE UP
-  MINOCYCLINE: SIGNIFICANT CHANGE UP
-  PIPERACILLIN/TAZOBACTAM: SIGNIFICANT CHANGE UP
-  TIGECYCLINE: SIGNIFICANT CHANGE UP
-  TOBRAMYCIN: SIGNIFICANT CHANGE UP
-  TRIMETHOPRIM/SULFAMETHOXAZOLE: SIGNIFICANT CHANGE UP
ADD ON TEST-SPECIMEN IN LAB: SIGNIFICANT CHANGE UP
ALBUMIN SERPL ELPH-MCNC: 3.6 G/DL — SIGNIFICANT CHANGE UP (ref 3.3–5)
ALP SERPL-CCNC: 172 U/L — HIGH (ref 40–120)
ALT FLD-CCNC: 80 U/L — HIGH (ref 4–41)
ANION GAP SERPL CALC-SCNC: 12 MMOL/L — SIGNIFICANT CHANGE UP (ref 7–14)
AST SERPL-CCNC: 146 U/L — HIGH (ref 4–40)
BILIRUB DIRECT SERPL-MCNC: 8.2 MG/DL — HIGH (ref 0–0.3)
BILIRUB DIRECT SERPL-MCNC: 8.2 MG/DL — SIGNIFICANT CHANGE UP (ref 0–0.3)
BILIRUB INDIRECT FLD-MCNC: 2.7 MG/DL — HIGH (ref 0–1)
BILIRUB INDIRECT FLD-MCNC: 2.7 MG/DL — HIGH (ref 0–1)
BILIRUB SERPL-MCNC: 10.9 MG/DL — HIGH (ref 0.2–1.2)
BILIRUB SERPL-MCNC: 10.9 MG/DL — SIGNIFICANT CHANGE UP (ref 0.2–1.2)
BUN SERPL-MCNC: 26 MG/DL — HIGH (ref 7–23)
CALCIUM SERPL-MCNC: 9 MG/DL — SIGNIFICANT CHANGE UP (ref 8.4–10.5)
CHLORIDE SERPL-SCNC: 99 MMOL/L — SIGNIFICANT CHANGE UP (ref 98–107)
CO2 SERPL-SCNC: 24 MMOL/L — SIGNIFICANT CHANGE UP (ref 22–31)
CREAT SERPL-MCNC: 0.98 MG/DL — SIGNIFICANT CHANGE UP (ref 0.5–1.3)
CULTURE RESULTS: ABNORMAL
CULTURE RESULTS: ABNORMAL
EGFR: 97 ML/MIN/1.73M2 — SIGNIFICANT CHANGE UP
GLUCOSE SERPL-MCNC: 98 MG/DL — SIGNIFICANT CHANGE UP (ref 70–99)
HAPTOGLOB SERPL-MCNC: <20 MG/DL — LOW (ref 34–200)
HCT VFR BLD CALC: 16.8 % — CRITICAL LOW (ref 39–50)
HGB BLD-MCNC: 6 G/DL — CRITICAL LOW (ref 13–17)
LDH SERPL L TO P-CCNC: 687 U/L — HIGH (ref 135–225)
MAGNESIUM SERPL-MCNC: 1.6 MG/DL — SIGNIFICANT CHANGE UP (ref 1.6–2.6)
MCHC RBC-ENTMCNC: 29.1 PG — SIGNIFICANT CHANGE UP (ref 27–34)
MCHC RBC-ENTMCNC: 35.7 G/DL — SIGNIFICANT CHANGE UP (ref 32–36)
MCV RBC AUTO: 81.6 FL — SIGNIFICANT CHANGE UP (ref 80–100)
METHOD TYPE: SIGNIFICANT CHANGE UP
NRBC # BLD: 0 /100 WBCS — SIGNIFICANT CHANGE UP (ref 0–0)
NRBC # FLD: 0.05 K/UL — HIGH (ref 0–0)
ORGANISM # SPEC MICROSCOPIC CNT: ABNORMAL
PHOSPHATE SERPL-MCNC: 3 MG/DL — SIGNIFICANT CHANGE UP (ref 2.5–4.5)
PLATELET # BLD AUTO: 335 K/UL — SIGNIFICANT CHANGE UP (ref 150–400)
POTASSIUM SERPL-MCNC: 4.5 MMOL/L — SIGNIFICANT CHANGE UP (ref 3.5–5.3)
POTASSIUM SERPL-SCNC: 4.5 MMOL/L — SIGNIFICANT CHANGE UP (ref 3.5–5.3)
PROT SERPL-MCNC: 6.8 G/DL — SIGNIFICANT CHANGE UP (ref 6–8.3)
RBC # BLD: 2.06 M/UL — LOW (ref 4.2–5.8)
RBC # BLD: 2.06 M/UL — LOW (ref 4.2–5.8)
RBC # FLD: 19 % — HIGH (ref 10.3–14.5)
RETICS #: 187.5 K/UL — HIGH (ref 25–125)
RETICS/RBC NFR: 9.1 % — HIGH (ref 0.5–2.5)
SODIUM SERPL-SCNC: 135 MMOL/L — SIGNIFICANT CHANGE UP (ref 135–145)
SPECIMEN SOURCE: SIGNIFICANT CHANGE UP
SPECIMEN SOURCE: SIGNIFICANT CHANGE UP
WBC # BLD: 14.6 K/UL — HIGH (ref 3.8–10.5)
WBC # FLD AUTO: 14.6 K/UL — HIGH (ref 3.8–10.5)

## 2024-12-22 PROCEDURE — 99233 SBSQ HOSP IP/OBS HIGH 50: CPT

## 2024-12-22 PROCEDURE — 99233 SBSQ HOSP IP/OBS HIGH 50: CPT | Mod: GC

## 2024-12-22 RX ORDER — ACETAMINOPHEN 80 MG/.8ML
1000 SOLUTION/ DROPS ORAL ONCE
Refills: 0 | Status: DISCONTINUED | OUTPATIENT
Start: 2024-12-22 | End: 2024-12-22

## 2024-12-22 RX ORDER — FUROSEMIDE 20 MG
20 TABLET ORAL ONCE
Refills: 0 | Status: COMPLETED | OUTPATIENT
Start: 2024-12-22 | End: 2024-12-22

## 2024-12-22 RX ADMIN — MEROPENEM 100 MILLIGRAM(S): 1 INJECTION, POWDER, FOR SOLUTION INTRAVENOUS at 13:23

## 2024-12-22 RX ADMIN — Medication 30 MILLILITER(S): at 07:18

## 2024-12-22 RX ADMIN — CHLORHEXIDINE GLUCONATE 1 APPLICATION(S): 1.2 RINSE ORAL at 13:30

## 2024-12-22 RX ADMIN — Medication 30 MILLILITER(S): at 23:38

## 2024-12-22 RX ADMIN — MEROPENEM 100 MILLIGRAM(S): 1 INJECTION, POWDER, FOR SOLUTION INTRAVENOUS at 05:51

## 2024-12-22 RX ADMIN — MEROPENEM 100 MILLIGRAM(S): 1 INJECTION, POWDER, FOR SOLUTION INTRAVENOUS at 22:28

## 2024-12-22 RX ADMIN — NIFEDIPINE 30 MILLIGRAM(S): 60 TABLET, EXTENDED RELEASE ORAL at 05:29

## 2024-12-22 RX ADMIN — Medication 30 MILLILITER(S): at 19:54

## 2024-12-22 RX ADMIN — Medication 20 MILLIGRAM(S): at 11:50

## 2024-12-22 RX ADMIN — PANTOPRAZOLE 40 MILLIGRAM(S): 40 TABLET, DELAYED RELEASE ORAL at 05:29

## 2024-12-22 RX ADMIN — ALLOPURINOL 50 MILLIGRAM(S): 100 TABLET ORAL at 13:23

## 2024-12-22 RX ADMIN — Medication 1 MILLIGRAM(S): at 13:23

## 2024-12-22 RX ADMIN — ENOXAPARIN SODIUM 40 MILLIGRAM(S): 60 INJECTION INTRAVENOUS; SUBCUTANEOUS at 16:22

## 2024-12-22 NOTE — PROGRESS NOTE ADULT - PROBLEM SELECTOR PLAN 1
Fever to 101.2 at 21:35 on 12/17/24. Reported to have O2 sat in 80s at that time but patient denied dyspnea, since then patient is back on RA satting well. No other new symptoms at time of fever (see chart note). CXR clear. CTA negative earlier in hospitalization for PE. No dysuria, rash or other focal infectious symptoms. Recent staph epi bacteremia during November admission here, seen by ID at that time. Patient has port in place, not appearing acutely infected. No sore throat, cough or other URI symptoms.   - 12/17/24 Blood cultures negative x 2 at 48 hrs  - Lactate nl. Continued leukocytosis up to 14  - ID consulted, recs appreciated. No new focal infectious symptoms. Started meropenem at 6:46am 12/20/24. F/u additional ID recs.  -blood culture with Enterobacterales and pantoea, ID reconsulted, rec to cont meropenem and rpt blood cultures, f/u blood cultures from 12/22 Fever to 101.2 at 21:35 on 12/17/24. Reported to have O2 sat in 80s at that time but patient denied dyspnea, since then patient is back on RA satting well. No other new symptoms at time of fever (see chart note). CXR clear. CTA negative earlier in hospitalization for PE. No dysuria, rash or other focal infectious symptoms. Recent staph epi bacteremia during November admission here, seen by ID at that time. Patient has port in place, not appearing acutely infected. No sore throat, cough or other URI symptoms.   - 12/17/24 Blood cultures negative x 2 at 48 hrs  - Lactate nl. Continued leukocytosis up to 14  - ID consulted, recs appreciated. No new focal infectious symptoms. Started meropenem at 6:46am 12/20/24. F/u additional ID recs.  -blood culture with Enterobacterales and pantoea, ID reconsulted, rec to cont meropenem and rpt blood cultures, f/u blood cultures from 12/22, per ID and heme check MRCP

## 2024-12-22 NOTE — PROGRESS NOTE ADULT - PROBLEM SELECTOR PLAN 8
DVT prop: lovenox. Elevated risk of DVT given SCC. No signs of bleeding.   low Na diet        Plan discussed with ACP

## 2024-12-22 NOTE — CHART NOTE - NSCHARTNOTEFT_GEN_A_CORE
Heme Onc recommending further imaging of biliary tract - further discussed with ID, pt without abdominal symptoms however can check MRCP.

## 2024-12-22 NOTE — PROGRESS NOTE ADULT - ASSESSMENT
44 year old male with history of sickle cell anemia presenting today s/p mechanical fall    Sickle cell disease  - follows with Dr Jordan of [facility]  - Hgb 4.9 on 12/13, s/p 1u PRBCs 12/18/24. --> Hgb 6.0 today with improvement in hemolysis parameters except bilirubin   - chronic hemolysis, Iron overload  - pls limit transfusions, maintain Hgb 5.5  - Continue Jadenu, maximize pain control  - ongoing care after discharge  - continues to experience back pain, on Dilaudid PCA pump  - monitor sxs and cbc closely.     Fever  - CXR clear. CTA negative earlier in hospitalization for PE.   - Blood cultures 12/17 no growth.  - Febrile 12/20/24 102.3  - 12/17/24 Blood cultures negative x 2 at 48 hrs  - Started meropenem at 6:46am 12/20/24. F/u additional ID recs.  - given recurrent fever follow up further ID recs. His hemolysis parameters are improving and unlikely this to be related. CXR is clear. I am concerned however given improvement in other hemolysis labs that his bilirubin continues to rise. Please fractionate and consider biliary source if continues to increase with fevers. May need further imaging of biliary tract. Monitor closely   - 12/21 another fever- please obtain LFTs, fractionate bilirubin and would obtain further imaging     Fall  -s/p mechanical fall, CTH negative  -admitted with rib pain and SOB on ambulation  -CTA without pulmonary embolism  -Defer pain management to medicine team    will continue to follow    Arash Serrato MD   Hematology/Oncology  [facility]  [phone] (office)   Evenings and weekends please call MD on call or office

## 2024-12-22 NOTE — PROGRESS NOTE ADULT - SUBJECTIVE AND OBJECTIVE BOX
Follow Up:  fever    Interval History/ROS: pt again had fever and chills but no cough and pain controlled, blood cx with pantoa            Allergies  penicillin (Pruritus)  hydroxyurea (Other)  ceftriaxone (Anaphylaxis)  piperacillin-tazobactam (Urticaria)        ANTIMICROBIALS:  meropenem  IVPB 1000 every 8 hours      OTHER MEDS:  allopurinol 50 milliGRAM(s) Oral daily  chlorhexidine 2% Cloths 1 Application(s) Topical daily  enoxaparin Injectable 40 milliGRAM(s) SubCutaneous every 24 hours  folic acid 1 milliGRAM(s) Oral every 24 hours  HYDROmorphone PCA (5 mG/mL) 30 milliLiter(s) PCA Continuous PCA Continuous  influenza   Vaccine 0.5 milliLiter(s) IntraMuscular once  naloxone Injectable 0.1 milliGRAM(s) IV Push once PRN  NIFEdipine XL 30 milliGRAM(s) Oral daily  ondansetron Injectable 4 milliGRAM(s) IV Push every 8 hours PRN  pantoprazole    Tablet 40 milliGRAM(s) Oral before breakfast  sodium chloride 0.45%. 1000 milliLiter(s) IV Continuous <Continuous>      Vital Signs Last 24 Hrs  T(C): 36.9 (22 Dec 2024 07:30), Max: 39.3 (21 Dec 2024 20:49)  T(F): 98.5 (22 Dec 2024 07:30), Max: 102.8 (21 Dec 2024 20:49)  HR: 110 (22 Dec 2024 07:30) (100 - 110)  BP: 135/79 (22 Dec 2024 07:30) (118/73 - 145/77)  BP(mean): --  RR: 17 (22 Dec 2024 07:30) (17 - 18)  SpO2: 97% (22 Dec 2024 07:30) (95% - 100%)    Parameters below as of 22 Dec 2024 07:30  Patient On (Oxygen Delivery Method): nasal cannula  O2 Flow (L/min): 3      Physical Exam:  General:    NAD,  non toxic  Respiratory:    comfortable on RA  abd:     soft,    no tenderness  :  no  daniel  Musculoskeletal:   no joint swelling  vascular: R chest port with no tenderness or erythema  Skin:    no rash                        6.0    14.60 )-----------( 335      ( 22 Dec 2024 06:44 )             16.8       12-22    135  |  99  |  26[H]  ----------------------------<  98  4.5   |  24  |  0.98    Ca    9.0      22 Dec 2024 06:44  Phos  3.0     12-22  Mg     1.60     12-22    TPro  x   /  Alb  x   /  TBili  10.9[H]  /  DBili  8.2[H]  /  AST  x   /  ALT  x   /  AlkPhos  x   12-22      Urinalysis Basic - ( 22 Dec 2024 06:44 )    Color: x / Appearance: x / SG: x / pH: x  Gluc: 98 mg/dL / Ketone: x  / Bili: x / Urobili: x   Blood: x / Protein: x / Nitrite: x   Leuk Esterase: x / RBC: x / WBC x   Sq Epi: x / Non Sq Epi: x / Bacteria: x        MICROBIOLOGY:  v  .Blood BLOOD  12-20-24   Growth in aerobic and anaerobic bottles: Pantoea dispersa  See previous culture [id number]  --    Growth in aerobic bottle: Gram Negative Rods  Growth in anaerobic bottle: Gram Negative Rods      .Blood BLOOD  12-20-24   Growth in aerobic and anaerobic bottles: Pantoea dispersa  Direct identification is available within approximately 3-5  hours either by Blood Panel Multiplexed PCR or Direct  MALDI-TOF. Details: [email or web address]  --  Blood Culture PCR      .Blood BLOOD  12-17-24   No growth at 4 days  --  --      .Blood BLOOD  12-17-24   No growth at 4 days  --  --          Rapid RVP Result: NotDetec (12-18 @ 10:09)        RADIOLOGY:  Images independently visualized and reviewed personally, findings as below  < from: Xray Chest 1 View- PORTABLE-Urgent (Xray Chest 1 View- PORTABLE-Urgent .) (12.20.24 @ 05:37) >    IMPRESSION: No focal abnormality to account for fever.    < end of copied text >  < from: CT Angio Chest PE Protocol w/ IV Cont (12.09.24 @ 03:02) >  IMPRESSION:  No pulmonary embolus.    No acute fracture.    < end of copied text >

## 2024-12-22 NOTE — PROGRESS NOTE ADULT - SUBJECTIVE AND OBJECTIVE BOX
Patient is a 44y old  Male [facility], [facility] (22 Dec 2024 11:52)          SUBJECTIVE / OVERNIGHT EVENTS: Pt seen and examined at 11:50am, spiked a temp to 102.8 overnight, reports pain is under control, still with leg swelling, denies any sob or any other complaints.          MEDICATIONS  (STANDING):  allopurinol 50 milliGRAM(s) Oral daily  chlorhexidine 2% Cloths 1 Application(s) Topical daily  enoxaparin Injectable 40 milliGRAM(s) SubCutaneous every 24 hours  folic acid 1 milliGRAM(s) Oral every 24 hours  HYDROmorphone PCA (5 mG/mL) 30 milliLiter(s) PCA Continuous PCA Continuous  influenza   Vaccine 0.5 milliLiter(s) IntraMuscular once  meropenem  IVPB 1000 milliGRAM(s) IV Intermittent every 8 hours  NIFEdipine XL 30 milliGRAM(s) Oral daily  pantoprazole    Tablet 40 milliGRAM(s) Oral before breakfast  sodium chloride 0.45%. 1000 milliLiter(s) (30 mL/Hr) IV Continuous <Continuous>    MEDICATIONS  (PRN):  naloxone Injectable 0.1 milliGRAM(s) IV Push once PRN Opioid Overdose  ondansetron Injectable 4 milliGRAM(s) IV Push every 8 hours PRN Nausea and/or Vomiting      Vital Signs Last 24 Hrs  T(C): 37.3 (22 Dec 2024 11:30), Max: 39.3 (21 Dec 2024 20:49)  T(F): 99.2 (22 Dec 2024 11:30), Max: 102.8 (21 Dec 2024 20:49)  HR: 115 (22 Dec 2024 11:30) (100 - 115)  BP: 125/73 (22 Dec 2024 11:30) (118/73 - 145/77)  BP(mean): --  RR: 18 (22 Dec 2024 11:30) (17 - 18)  SpO2: 97% (22 Dec 2024 11:30) (95% - 100%)    Parameters below as of 22 Dec 2024 11:30  Patient On (Oxygen Delivery Method): nasal cannula  O2 Flow (L/min): 3    CAPILLARY BLOOD GLUCOSE        I&O's Summary    21 Dec 2024 07:01  -  22 Dec 2024 07:00  --------------------------------------------------------  IN: 0 mL / OUT: 1650 mL / NET: -1650 mL        PHYSICAL EXAM:  CONSTITUTIONAL: NAD  RESPIRATORY: Normal respiratory effort; lungs are clear to auscultation bilaterally  CARDIOVASCULAR: Regular rate and rhythm, normal S1 and S2, pitting b/l lower extremity edema+  ABDOMEN: Soft, Nontender to palpation, nondistended  EXTREMITIES:L b/l LE edema+  PSYCH: calm  NEURO: AA answers questions appropriately    LABS:                        6.0    14.60 )-----------( 335      ( 22 Dec 2024 06:44 )             16.8     12-22    135  |  99  |  26[H]  ----------------------------<  98  4.5   |  24  |  0.98    Ca    9.0      22 Dec 2024 06:44  Phos  3.0     12-22  Mg     1.60     12-22    TPro  x   /  Alb  x   /  TBili  10.9[H]  /  DBili  8.2[H]  /  AST  x   /  ALT  x   /  AlkPhos  x   12-22          Urinalysis Basic - ( 22 Dec 2024 06:44 )    Color: x / Appearance: x / SG: x / pH: x  Gluc: 98 mg/dL / Ketone: x  / Bili: x / Urobili: x   Blood: x / Protein: x / Nitrite: x   Leuk Esterase: x / RBC: x / WBC x   Sq Epi: x / Non Sq Epi: x / Bacteria: x        RADIOLOGY & ADDITIONAL TESTS:    Imaging Personally Reviewed:    Consultant(s) Notes Reviewed:      Care Discussed with Consultants/Other Providers:

## 2024-12-22 NOTE — PROGRESS NOTE ADULT - PROBLEM SELECTOR PLAN 2
Patient presents with L rib and back pain s/p trauma  Patient reports hg baseline is ~ 5.5  - C/w dilaudid PCA, monitor pain- improving, continue to wean as able  - Bowel regimen with Senna daily, miralax prn  -- Patient follows Dr Jordan of [facility] - appreciate hem/onc input here, s/p 2 unit pRBC this admission. Trend Hg.   - Pt on Jadenu at home, discussed with Heme onc, hold at this time, plan to resume at time of discharge   - no signs of acute chest- addressed below  12/16- experienced feelings of restlessness/ concern for possible opioid withdrawal- since improved. No longer feeling jittery or restless. Clinically without withdrawal symptoms. CTM.

## 2024-12-22 NOTE — PROGRESS NOTE ADULT - ASSESSMENT
44 m with sickle cell disease with a port and previous staph epi bacteremia  admitted 12/9 after mechanical fall.  noted to be anemic; received a blood transfusion 12/13 and 12/18  CTA no PE  Fever 101 on 12/17  WBC increased to 14 now  blood cultures 12/17 negative  fever gain to 102.3 on 12/20  blood cx 12/20 with pantoa  still febrile to 102.8      sickle cell with port and previous staph epi bacteremia now admitted with fall and then crisis, new fever in the hospital and pantoea bacteremia, port with no tenderness or erythema  penicillin allergy    * follow the pantoea sensitivities  * c/w casey for now  * repeat blood cx  * monitor CBC/diff and CMP    The above assessment and plan was discussed with the primary team    Nuvia Lopez MD  contact on teams  After 5pm and on weekends call [phone]

## 2024-12-22 NOTE — PROGRESS NOTE ADULT - SUBJECTIVE AND OBJECTIVE BOX
INTERVAL HPI/OVERNIGHT EVENTS:  Patient S&E at bedside. Overnight another fever 102.8     VITAL SIGNS:  T(F): 99.1 (12-22-24 @ 05:25)  HR: 102 (12-22-24 @ 05:25)  BP: 145/77 (12-22-24 @ 05:25)  RR: 18 (12-22-24 @ 05:25)  SpO2: 100% (12-22-24 @ 05:25)  Wt(kg): --    PHYSICAL EXAM:    Constitutional: NAD, on oxygen comfortable appearing   Eyes: EOMI, sclera non-icteric  Neck: supple  Respiratory: CTAB, no wheezes or crackles   Cardiovascular: RRR  Gastrointestinal: soft, NTND, + BS  Extremities: no cyanosis, clubbing or edema   Neurological: awake and alert      MEDICATIONS  (STANDING):  allopurinol 50 milliGRAM(s) Oral daily  chlorhexidine 2% Cloths 1 Application(s) Topical daily  enoxaparin Injectable 40 milliGRAM(s) SubCutaneous every 24 hours  folic acid 1 milliGRAM(s) Oral every 24 hours  HYDROmorphone PCA (5 mG/mL) 30 milliLiter(s) PCA Continuous PCA Continuous  influenza   Vaccine 0.5 milliLiter(s) IntraMuscular once  meropenem  IVPB 1000 milliGRAM(s) IV Intermittent every 8 hours  NIFEdipine XL 30 milliGRAM(s) Oral daily  pantoprazole    Tablet 40 milliGRAM(s) Oral before breakfast  sodium chloride 0.45%. 1000 milliLiter(s) (30 mL/Hr) IV Continuous <Continuous>    MEDICATIONS  (PRN):  naloxone Injectable 0.1 milliGRAM(s) IV Push once PRN Opioid Overdose  ondansetron Injectable 4 milliGRAM(s) IV Push every 8 hours PRN Nausea and/or Vomiting      Allergies    penicillin (Pruritus)  hydroxyurea (Other)  ceftriaxone (Anaphylaxis)  piperacillin-tazobactam (Urticaria)    Intolerances        LABS:                        6.0    11.04 )-----------( 287      ( 21 Dec 2024 04:11 )             17.5     12-21    135  |  101  |  29[H]  ----------------------------<  98  4.3   |  22  |  0.92    Ca    8.7      21 Dec 2024 04:11  Phos  3.2     12-21  Mg     1.50     12-21        Urinalysis Basic - ( 21 Dec 2024 04:11 )    Color: x / Appearance: x / SG: x / pH: x  Gluc: 98 mg/dL / Ketone: x  / Bili: x / Urobili: x   Blood: x / Protein: x / Nitrite: x   Leuk Esterase: x / RBC: x / WBC x   Sq Epi: x / Non Sq Epi: x / Bacteria: x        RADIOLOGY & ADDITIONAL TESTS:  Studies reviewed.

## 2024-12-23 DIAGNOSIS — A41.9 SEPSIS, UNSPECIFIED ORGANISM: ICD-10-CM

## 2024-12-23 LAB
ALBUMIN SERPL ELPH-MCNC: 3.3 G/DL — SIGNIFICANT CHANGE UP (ref 3.3–5)
ALP SERPL-CCNC: 166 U/L — HIGH (ref 40–120)
ALT FLD-CCNC: 66 U/L — HIGH (ref 4–41)
ANION GAP SERPL CALC-SCNC: 10 MMOL/L — SIGNIFICANT CHANGE UP (ref 7–14)
AST SERPL-CCNC: 121 U/L — HIGH (ref 4–40)
BILIRUB DIRECT SERPL-MCNC: 8.3 MG/DL — HIGH (ref 0–0.3)
BILIRUB INDIRECT FLD-MCNC: 2.3 MG/DL — HIGH (ref 0–1)
BILIRUB SERPL-MCNC: 10.6 MG/DL — HIGH (ref 0.2–1.2)
BUN SERPL-MCNC: 24 MG/DL — HIGH (ref 7–23)
CALCIUM SERPL-MCNC: 8.6 MG/DL — SIGNIFICANT CHANGE UP (ref 8.4–10.5)
CHLORIDE SERPL-SCNC: 98 MMOL/L — SIGNIFICANT CHANGE UP (ref 98–107)
CO2 SERPL-SCNC: 25 MMOL/L — SIGNIFICANT CHANGE UP (ref 22–31)
CREAT SERPL-MCNC: 1 MG/DL — SIGNIFICANT CHANGE UP (ref 0.5–1.3)
CULTURE RESULTS: SIGNIFICANT CHANGE UP
CULTURE RESULTS: SIGNIFICANT CHANGE UP
EGFR: 95 ML/MIN/1.73M2 — SIGNIFICANT CHANGE UP
GLUCOSE SERPL-MCNC: 112 MG/DL — HIGH (ref 70–99)
HAPTOGLOB SERPL-MCNC: <20 MG/DL — LOW (ref 34–200)
HCT VFR BLD CALC: 16.4 % — CRITICAL LOW (ref 39–50)
HGB BLD-MCNC: 5.7 G/DL — CRITICAL LOW (ref 13–17)
LDH SERPL L TO P-CCNC: 603 U/L — HIGH (ref 135–225)
MAGNESIUM SERPL-MCNC: 1.4 MG/DL — LOW (ref 1.6–2.6)
MCHC RBC-ENTMCNC: 28.8 PG — SIGNIFICANT CHANGE UP (ref 27–34)
MCHC RBC-ENTMCNC: 34.8 G/DL — SIGNIFICANT CHANGE UP (ref 32–36)
MCV RBC AUTO: 82.8 FL — SIGNIFICANT CHANGE UP (ref 80–100)
NRBC # BLD: 0 /100 WBCS — SIGNIFICANT CHANGE UP (ref 0–0)
NRBC # FLD: 0.02 K/UL — HIGH (ref 0–0)
ORGANISM # SPEC MICROSCOPIC CNT: ABNORMAL
PHOSPHATE SERPL-MCNC: 2.7 MG/DL — SIGNIFICANT CHANGE UP (ref 2.5–4.5)
PLATELET # BLD AUTO: 282 K/UL — SIGNIFICANT CHANGE UP (ref 150–400)
POTASSIUM SERPL-MCNC: 4.6 MMOL/L — SIGNIFICANT CHANGE UP (ref 3.5–5.3)
POTASSIUM SERPL-SCNC: 4.6 MMOL/L — SIGNIFICANT CHANGE UP (ref 3.5–5.3)
PROT SERPL-MCNC: 6.2 G/DL — SIGNIFICANT CHANGE UP (ref 6–8.3)
RBC # BLD: 1.98 M/UL — LOW (ref 4.2–5.8)
RBC # BLD: 1.98 M/UL — LOW (ref 4.2–5.8)
RBC # FLD: 18.8 % — HIGH (ref 10.3–14.5)
RETICS #: 141 K/UL — HIGH (ref 25–125)
RETICS/RBC NFR: 7.1 % — HIGH (ref 0.5–2.5)
SODIUM SERPL-SCNC: 133 MMOL/L — LOW (ref 135–145)
SPECIMEN SOURCE: SIGNIFICANT CHANGE UP
SPECIMEN SOURCE: SIGNIFICANT CHANGE UP
WBC # BLD: 14.49 K/UL — HIGH (ref 3.8–10.5)
WBC # FLD AUTO: 14.49 K/UL — HIGH (ref 3.8–10.5)

## 2024-12-23 PROCEDURE — 99233 SBSQ HOSP IP/OBS HIGH 50: CPT

## 2024-12-23 PROCEDURE — 99232 SBSQ HOSP IP/OBS MODERATE 35: CPT

## 2024-12-23 PROCEDURE — 74183 MRI ABD W/O CNTR FLWD CNTR: CPT | Mod: 26

## 2024-12-23 PROCEDURE — 71045 X-RAY EXAM CHEST 1 VIEW: CPT | Mod: 26

## 2024-12-23 RX ORDER — ACETAMINOPHEN 80 MG/.8ML
1000 SOLUTION/ DROPS ORAL ONCE
Refills: 0 | Status: COMPLETED | OUTPATIENT
Start: 2024-12-23 | End: 2024-12-23

## 2024-12-23 RX ORDER — MAGNESIUM SULFATE 500 MG/ML
2 INJECTION, SOLUTION INTRAMUSCULAR; INTRAVENOUS ONCE
Refills: 0 | Status: COMPLETED | OUTPATIENT
Start: 2024-12-23 | End: 2024-12-23

## 2024-12-23 RX ORDER — FUROSEMIDE 20 MG
40 TABLET ORAL ONCE
Refills: 0 | Status: COMPLETED | OUTPATIENT
Start: 2024-12-23 | End: 2024-12-23

## 2024-12-23 RX ADMIN — Medication 1 MILLIGRAM(S): at 13:46

## 2024-12-23 RX ADMIN — MEROPENEM 100 MILLIGRAM(S): 1 INJECTION, POWDER, FOR SOLUTION INTRAVENOUS at 06:35

## 2024-12-23 RX ADMIN — ACETAMINOPHEN 1000 MILLIGRAM(S): 80 SOLUTION/ DROPS ORAL at 21:28

## 2024-12-23 RX ADMIN — MEROPENEM 100 MILLIGRAM(S): 1 INJECTION, POWDER, FOR SOLUTION INTRAVENOUS at 21:30

## 2024-12-23 RX ADMIN — ENOXAPARIN SODIUM 40 MILLIGRAM(S): 60 INJECTION INTRAVENOUS; SUBCUTANEOUS at 16:16

## 2024-12-23 RX ADMIN — CHLORHEXIDINE GLUCONATE 1 APPLICATION(S): 1.2 RINSE ORAL at 13:45

## 2024-12-23 RX ADMIN — Medication 30 MILLILITER(S): at 08:38

## 2024-12-23 RX ADMIN — Medication 30 MILLILITER(S): at 11:30

## 2024-12-23 RX ADMIN — Medication 40 MILLIGRAM(S): at 12:19

## 2024-12-23 RX ADMIN — ACETAMINOPHEN 400 MILLIGRAM(S): 80 SOLUTION/ DROPS ORAL at 20:43

## 2024-12-23 RX ADMIN — NIFEDIPINE 30 MILLIGRAM(S): 60 TABLET, EXTENDED RELEASE ORAL at 06:35

## 2024-12-23 RX ADMIN — MEROPENEM 100 MILLIGRAM(S): 1 INJECTION, POWDER, FOR SOLUTION INTRAVENOUS at 13:46

## 2024-12-23 RX ADMIN — Medication 30 MILLILITER(S): at 19:22

## 2024-12-23 RX ADMIN — MAGNESIUM SULFATE 25 GRAM(S): 500 INJECTION, SOLUTION INTRAMUSCULAR; INTRAVENOUS at 09:04

## 2024-12-23 RX ADMIN — PANTOPRAZOLE 40 MILLIGRAM(S): 40 TABLET, DELAYED RELEASE ORAL at 06:35

## 2024-12-23 RX ADMIN — ALLOPURINOL 50 MILLIGRAM(S): 100 TABLET ORAL at 13:45

## 2024-12-23 RX ADMIN — ACETAMINOPHEN 400 MILLIGRAM(S): 80 SOLUTION/ DROPS ORAL at 09:04

## 2024-12-23 NOTE — PROGRESS NOTE ADULT - PROBLEM SELECTOR PLAN 1
Fever to 101.2 at 21:35 on 12/17/24. Reported to have O2 sat in 80s With leukocytosis. focal infectious symptoms. Recent staph epi bacteremia during November admission here, seen by ID at that time. Patient has port in place,   - -blood culture with Enterobacterales and pantoea, ID reconsulted, rec to cont meropenem and rpt blood cultures, f/u blood cultures from 12/22, per ID and heme check MRCP  - Still spiking fever with leukocytosis, f/u ID  - ID consulted, recs appreciated. No new focal infectious symptoms. Started meropenem at 6:46am 12/20/24. F/u additional ID recs.

## 2024-12-23 NOTE — PROGRESS NOTE ADULT - PROBLEM SELECTOR PLAN 8
DVT prop: lovenox. Elevated risk of DVT given SCC. No signs of bleeding.   low Na diet    Heme notified about the need for Exchange transfusion 10:00AM DVT prop: lovenox. Elevated risk of DVT given SCC. No signs of bleeding.   low Na diet    Heme notified about the need for Exchange transfusion 10:50AM

## 2024-12-23 NOTE — PROGRESS NOTE ADULT - SUBJECTIVE AND OBJECTIVE BOX
Follow Up:  fever    Interval History/ROS:    feels okay today      had fever and chills over weekend     blood cx from 12/20  with pantoa      Allergies  penicillin (Pruritus)  hydroxyurea (Other)  ceftriaxone (Anaphylaxis)  piperacillin-tazobactam (Urticaria)        ANTIMICROBIALS:  meropenem  IVPB 1000 every 8 hours    MEDICATIONS  (STANDING):  allopurinol 50 daily  enoxaparin Injectable 40 every 24 hours  HYDROmorphone PCA (5 mG/mL) 30 PCA Continuous  influenza   Vaccine 0.5 once  NIFEdipine XL 30 daily  ondansetron Injectable 4 every 8 hours PRN  pantoprazole    Tablet 40 before breakfast      Vital Signs Last 24 Hrs  T(F): 98.3 (12-23-24 @ 15:40), Max: 102.6 (12-23-24 @ 07:30)  HR: 96 (12-23-24 @ 15:40)  BP: 116/63 (12-23-24 @ 15:40)  RR: 18 (12-23-24 @ 15:40)  SpO2: 95% (12-23-24 @ 15:40) (95% - 99%)    Physical Exam:  General:    NAD, non toxic, sitting side of bed  Respiratory:    no respiratory distress 3 L  abd:     soft,    no tenderness  :  no  daniel  Musculoskeletal:   no joint swelling  vascular: R chest port with no tenderness or erythema  Skin:    no rash                                   5.7    14.49 )-----------( 282      ( 23 Dec 2024 06:00 )             16.4 12-23    133  |  98  |  24  ----------------------------<  112  4.6   |  25  |  1.00  Ca    8.6      23 Dec 2024 06:00Phos  2.7     12-23Mg     1.40     12-23  TPro  6.2  /  Alb  3.3  /  TBili  10.6  /  DBili  8.3  /  AST  121  /  ALT  66  /  AlkPhos  166  12-23          MICROBIOLOGY:    Culture - Blood (12.22.24 @ 12:08)   Specimen Source: .Blood BLOOD  Culture Results:   No growth at 24 hours    Culture - Blood (12.22.24 @ 13:30)   Specimen Source: .Blood BLOOD  Culture Results:   No growth at 24 hours    .Blood BLOOD  12-20-24   Growth in aerobic and anaerobic bottles: Pantoea dispersa  See previous culture [id number]  --    Growth in aerobic bottle: Gram Negative Rods  Growth in anaerobic bottle: Gram Negative Rods  Culture - Blood (12.20.24 @ 05:39)   Gram Stain:   Growth in aerobic bottle: Gram Negative Rods   Growth in anaerobic bottle: Gram Negative Rods  - Amoxicillin/Clavulanic Acid: S <=8/4  - Ampicillin: S <=8 These ampicillin results predict results for amoxicillin  - Ampicillin/Sulbactam: S <=4/2  - Aztreonam: S <=4  - Cefazolin: R >16  - Cefepime: S <=2  - Cefotaxime: S <=2  - Cefoxitin: R >16  - Ceftazidime: S <=1  - Ceftriaxone: S <=1  - Cefuroxime: S <=4  - Ciprofloxacin: S <=0.25  - Gentamicin: S <=2  - Levofloxacin: S <=0.5  - Meropenem: S <=1  - Ertapenem: S <=0.5  - Piperacillin/Tazobactam: S <=8  - Tigecycline: S <=2  - Tobramycin: S <=2  - Trimethoprim/Sulfamethoxazole: S <=0.5/9.5  - Minocycline: S <=4  - Enterobacterales: Detec  Specimen Source: .Blood BLOOD  Organism: Blood Culture PCR  Organism: Gram Negative Rods  Culture Results:   Growth in aerobic and anaerobic bottles: Pantoea dispersa   Direct identification is available within approximately 3-5   hours either by Blood Panel Multiplexed PCR or Direct   MALDI-TOF. Details: [email or web address]  Organism Identification: Blood Culture PCR   Gram Negative Rods  Method Type: TAMMY  Method Type: PCR    .Blood BLOOD  12-20-24   Growth in aerobic and anaerobic bottles: Pantoea dispersa  Direct identification is available within approximately 3-5  hours either by Blood Panel Multiplexed PCR or Direct  MALDI-TOF. Details: [email or web address]  --  Blood Culture PCR      .Blood BLOOD  12-17-24   No growth at 5 days  --  --      .Blood BLOOD  12-17-24   No growth at 5 days  --  --          Rapid RVP Result: NotDete (12-18 @ 10:09)        RADIOLOGY:  Images independently visualized and reviewed personally, findings as below  < from: Xray Chest 1 View- PORTABLE-Urgent (Xray Chest 1 View- PORTABLE-Urgent .) (12.20.24 @ 05:37) >    IMPRESSION: No focal abnormality to account for fever.    < end of copied text >  < from: CT Angio Chest PE Protocol w/ IV Cont (12.09.24 @ 03:02) >  IMPRESSION:  No pulmonary embolus.    No acute fracture.    < end of copied text >

## 2024-12-23 NOTE — PROGRESS NOTE ADULT - NS ATTEND AMEND GEN_ALL_CORE FT
Ongoing fevers, bacteremia  Rising DIRECT bili, transaminitis - MRCP pending  Had mild hypoxia since admission with no evidence of infiltrate on chest x-rays or CT and no chest pain, most recent CXR without evidence of PNA but likely atelectasis - has not been using incentive spirometer - given today and advised to use throughout the day  I also personally walked the patient up and down the hallway today without supplemental oxygen and he maintained a normal oxygen saturation  Still denies any chest pain  Do not see evidence of acute chest syndrome at this time  Concerned about a GI/biliary source of fevers, would consider GI consult and will see what MRCP shows, no prior abdominal imaging on this admission so far Ongoing fevers, bacteremia  Rising DIRECT bili, transaminitis - MRCP pending  Had mild hypoxia since admission with no evidence of infiltrate on chest x-rays or CT and no chest pain, most recent CXR without evidence of PNA but likely atelectasis - has not been using incentive spirometer - given today and advised to use throughout the day  I also personally walked the patient up and down the hallway today without supplemental oxygen and he maintained a normal oxygen saturation  Still denies any chest pain  Do not see evidence of acute chest syndrome at this time  Concerned about a GI/biliary source of fevers, would obtain GI consult and will see what MRCP shows, no prior abdominal imaging on this admission so far

## 2024-12-23 NOTE — PROGRESS NOTE ADULT - ASSESSMENT
44 year old male with history of sickle cell anemia presenting today s/p mechanical fall    Sickle cell disease  - follows with Dr Jordan of [facility]  - Hgb 4.9 on 12/13, s/p 1u PRBCs 12/18/24. --> Hgb 6.0 today with improvement in hemolysis parameters except bilirubin   - chronic hemolysis, Iron overload  - pls limit transfusions, maintain Hgb 5.5  - Continue Jadenu, maximize pain control  - ongoing care after discharge  - continues to experience back pain, on Dilaudid PCA pump  - monitor sxs and cbc closely.     Fever  - CXR clear. CTA negative earlier in hospitalization for PE.   - Blood cultures 12/17 no growth.  - Febrile 12/20/24 102.3  - 12/17/24 Blood cultures negative x 2 at 48 hrs  - Started meropenem at 6:46am 12/20/24. F/u additional ID recs.  - given recurrent fever follow up further ID recs. His hemolysis parameters are improving and unlikely this to be related. CXR is clear. I am concerned however given improvement in other hemolysis labs that his bilirubin continues to rise. Please fractionate and consider biliary source if continues to increase with fevers. May need further imaging of biliary tract. Monitor closely   - 12/21 another fever- please obtain LFTs, fractionate bilirubin and would obtain further imaging   -rec repeat CXR today with blood cultures    Fall  -s/p mechanical fall, CTH negative  -admitted with rib pain and SOB on ambulation  -CTA without pulmonary embolism  -Defer pain management to medicine team    will continue to follow    Arash Serrato MD   Hematology/Oncology  [facility]  [phone] (office)   Evenings and weekends please call MD on call or office     44 year old male with history of sickle cell anemia presenting today s/p mechanical fall    Sickle cell disease  - follows with Dr Jordan of [facility]  - Hgb 4.9 on 12/13, s/p 1u PRBCs 12/18/24. --> Hgb 6.0 today with improvement in hemolysis parameters except bilirubin   - chronic hemolysis, Iron overload  - pls limit transfusions, maintain Hgb 5.5  - Continue Jadenu, maximize pain control  - ongoing care after discharge  - continues to experience back pain, on Dilaudid PCA pump  - monitor sxs and cbc closely.     Fever  - CXR clear. CTA negative earlier in hospitalization for PE.   - Blood cultures 12/17 no growth.  - Febrile 12/20/24 102.3, now 102.6 today  - 12/17/24 Blood cultures negative x 2 at 48 hrs  - Started meropenem 12/20 F/u additional ID recs.  - given recurrent fever follow up further ID recs. His hemolysis parameters are improving and unlikely this to be related. CXR is clear. I am concerned however given improvement in other hemolysis labs that his bilirubin continues to rise. Please fractionate and consider biliary source if continues to increase with fevers. May need further imaging of biliary tract. Monitor closely   -  LFTs improving, would fractionate bilirubin and would obtain further imaging   -rec repeat CXR today, blood cultures with Gram neg bacteremia, on Meropenem    Fall  -s/p mechanical fall, CTH negative  -admitted with rib pain and SOB on ambulation  -CTA without pulmonary embolism  -Defer pain management to medicine team    will continue to follow      Meghana Real NP  Hematology/Oncology  [facility]  [phone] (Office)  [phone] (Alt office)  Evenings and weekends please call MD on call or office

## 2024-12-23 NOTE — PROGRESS NOTE ADULT - PROBLEM SELECTOR PLAN 2
Pt spiking fever, new CXR showed opacities ( neg CXR x 3 in the past few days), high suspicion for acute chest  Patient reports hg baseline is ~ 5.5  -Will have Heme eval for exhange transfusion eval given high suspicion for Acute chest Syndrome and hx of it in the past  - C/w dilaudid PCA, monitor pain- improving, continue to wean as able  - Bowel regimen with Senna daily, miralax prn  -- Patient follows Dr Jordan of [facility] - appreciate hem/onc input here, s/p 2 unit pRBC this admission. Trend Hg.   - Pt on Jadenu at home, discussed with Heme onc, hold at this time, plan to resume at time of discharge

## 2024-12-23 NOTE — PROGRESS NOTE ADULT - SUBJECTIVE AND OBJECTIVE BOX
[facility] Division of Hospital Medicine  Tony Mann MD  Pager [phone]      Patient is a 44y old  Male who presents with a chief complaint of mech fall, pain, dyspnea (23 Dec 2024 10:28)      SUBJECTIVE / OVERNIGHT EVENTS: No CP or SOB. LE edema    MEDICATIONS  (STANDING):  allopurinol 50 milliGRAM(s) Oral daily  chlorhexidine 2% Cloths 1 Application(s) Topical daily  enoxaparin Injectable 40 milliGRAM(s) SubCutaneous every 24 hours  folic acid 1 milliGRAM(s) Oral every 24 hours  HYDROmorphone PCA (5 mG/mL) 30 milliLiter(s) PCA Continuous PCA Continuous  influenza   Vaccine 0.5 milliLiter(s) IntraMuscular once  meropenem  IVPB 1000 milliGRAM(s) IV Intermittent every 8 hours  NIFEdipine XL 30 milliGRAM(s) Oral daily  pantoprazole    Tablet 40 milliGRAM(s) Oral before breakfast  sodium chloride 0.45%. 1000 milliLiter(s) (30 mL/Hr) IV Continuous <Continuous>    MEDICATIONS  (PRN):  naloxone Injectable 0.1 milliGRAM(s) IV Push once PRN Opioid Overdose  ondansetron Injectable 4 milliGRAM(s) IV Push every 8 hours PRN Nausea and/or Vomiting      CAPILLARY BLOOD GLUCOSE        I&O's Summary    22 Dec 2024 07:01  -  23 Dec 2024 07:00  --------------------------------------------------------  IN: 0 mL / OUT: 3150 mL / NET: -3150 mL        PHYSICAL EXAM:  Vital Signs Last 24 Hrs  T(C): 39.2 (23 Dec 2024 07:30), Max: 39.2 (23 Dec 2024 07:30)  T(F): 102.6 (23 Dec 2024 07:30), Max: 102.6 (23 Dec 2024 07:30)  HR: 110 (23 Dec 2024 07:30) (107 - 117)  BP: 133/81 (23 Dec 2024 07:30) (125/73 - 138/85)  BP(mean): 3 (22 Dec 2024 15:30) (3 - 3)  RR: 18 (23 Dec 2024 07:30) (17 - 18)  SpO2: 98% (23 Dec 2024 07:30) (94% - 99%)    Parameters below as of 23 Dec 2024 07:30  Patient On (Oxygen Delivery Method): nasal cannula  O2 Flow (L/min): 3    CONSTITUTIONAL: NAD  EYES: conjunctiva and sclera clear  ENMT: mmm  NECK: Supple,  RESPIRATORY: Normal respiratory effort; lungs are clear to auscultation bilaterally  CARDIOVASCULAR: Regular rate and rhythm, + S1 and S2, bilateral LE edema  ABDOMEN: Nontender to palpation, normoactive bowel sounds, no rebound/guarding  PSYCH: A+O x 3    LABS:                        5.7    14.49 )-----------( 282      ( 23 Dec 2024 06:00 )             16.4     12-23    133[L]  |  98  |  24[H]  ----------------------------<  112[H]  4.6   |  25  |  1.00    Ca    8.6      23 Dec 2024 06:00  Phos  2.7     12-23  Mg     1.40     12-23    TPro  6.2  /  Alb  3.3  /  TBili  10.6[H]  /  DBili  8.3[H]  /  AST  121[H]  /  ALT  66[H]  /  AlkPhos  166[H]  12-23          Urinalysis Basic - ( 23 Dec 2024 06:00 )    Color: x / Appearance: x / SG: x / pH: x  Gluc: 112 mg/dL / Ketone: x  / Bili: x / Urobili: x   Blood: x / Protein: x / Nitrite: x   Leuk Esterase: x / RBC: x / WBC x   Sq Epi: x / Non Sq Epi: x / Bacteria: x

## 2024-12-23 NOTE — PROGRESS NOTE ADULT - SUBJECTIVE AND OBJECTIVE BOX
Patient is a 44y old  Male who presents with a chief complaint of mech fall, pain, dyspnea (22 Dec 2024 13:56)  Tmax 102.6, 02 sat 98% 3L NC      MEDICATIONS  (STANDING):  allopurinol 50 milliGRAM(s) Oral daily  chlorhexidine 2% Cloths 1 Application(s) Topical daily  enoxaparin Injectable 40 milliGRAM(s) SubCutaneous every 24 hours  folic acid 1 milliGRAM(s) Oral every 24 hours  HYDROmorphone PCA (5 mG/mL) 30 milliLiter(s) PCA Continuous PCA Continuous  influenza   Vaccine 0.5 milliLiter(s) IntraMuscular once  meropenem  IVPB 1000 milliGRAM(s) IV Intermittent every 8 hours  NIFEdipine XL 30 milliGRAM(s) Oral daily  pantoprazole    Tablet 40 milliGRAM(s) Oral before breakfast  sodium chloride 0.45%. 1000 milliLiter(s) (30 mL/Hr) IV Continuous <Continuous>    MEDICATIONS  (PRN):  naloxone Injectable 0.1 milliGRAM(s) IV Push once PRN Opioid Overdose  ondansetron Injectable 4 milliGRAM(s) IV Push every 8 hours PRN Nausea and/or Vomiting        Vital Signs Last 24 Hrs  T(C): 39.2 (23 Dec 2024 07:30), Max: 39.2 (23 Dec 2024 07:30)  T(F): 102.6 (23 Dec 2024 07:30), Max: 102.6 (23 Dec 2024 07:30)  HR: 110 (23 Dec 2024 07:30) (107 - 117)  BP: 133/81 (23 Dec 2024 07:30) (125/73 - 138/85)  BP(mean): 3 (22 Dec 2024 15:30) (3 - 3)  RR: 18 (23 Dec 2024 07:30) (17 - 18)  SpO2: 98% (23 Dec 2024 07:30) (94% - 99%)    Parameters below as of 23 Dec 2024 07:30  Patient On (Oxygen Delivery Method): nasal cannula  O2 Flow (L/min): 3      PE  NAD  Awake, alert  Anicteric, MMM  RRR  CTAB  Abd soft, NT, ND  No c/c/e  No rash grossly                            5.7    14.49 )-----------( 282      ( 23 Dec 2024 06:00 )             16.4       12-23    133[L]  |  98  |  24[H]  ----------------------------<  112[H]  4.6   |  25  |  1.00    Ca    8.6      23 Dec 2024 06:00  Phos  2.7     12-23  Mg     1.40     12-23    TPro  6.2  /  Alb  3.3  /  TBili  10.6[H]  /  DBili  8.3[H]  /  AST  121[H]  /  ALT  66[H]  /  AlkPhos  166[H]  12-23

## 2024-12-24 DIAGNOSIS — D57.00 HB-SS DISEASE WITH CRISIS, UNSPECIFIED: ICD-10-CM

## 2024-12-24 LAB
ALBUMIN SERPL ELPH-MCNC: 3.2 G/DL — LOW (ref 3.3–5)
ALP SERPL-CCNC: 160 U/L — HIGH (ref 40–120)
ALT FLD-CCNC: 61 U/L — HIGH (ref 4–41)
ANION GAP SERPL CALC-SCNC: 13 MMOL/L — SIGNIFICANT CHANGE UP (ref 7–14)
AST SERPL-CCNC: 124 U/L — HIGH (ref 4–40)
BASOPHILS # BLD AUTO: 0.1 K/UL — SIGNIFICANT CHANGE UP (ref 0–0.2)
BASOPHILS NFR BLD AUTO: 0.8 % — SIGNIFICANT CHANGE UP (ref 0–2)
BILIRUB SERPL-MCNC: 13.2 MG/DL — HIGH (ref 0.2–1.2)
BLD GP AB SCN SERPL QL: NEGATIVE — SIGNIFICANT CHANGE UP
BUN SERPL-MCNC: 27 MG/DL — HIGH (ref 7–23)
CALCIUM SERPL-MCNC: 8.9 MG/DL — SIGNIFICANT CHANGE UP (ref 8.4–10.5)
CHLORIDE SERPL-SCNC: 98 MMOL/L — SIGNIFICANT CHANGE UP (ref 98–107)
CO2 SERPL-SCNC: 24 MMOL/L — SIGNIFICANT CHANGE UP (ref 22–31)
CREAT SERPL-MCNC: 0.98 MG/DL — SIGNIFICANT CHANGE UP (ref 0.5–1.3)
EGFR: 97 ML/MIN/1.73M2 — SIGNIFICANT CHANGE UP
EOSINOPHIL # BLD AUTO: 0.06 K/UL — SIGNIFICANT CHANGE UP (ref 0–0.5)
EOSINOPHIL NFR BLD AUTO: 0.5 % — SIGNIFICANT CHANGE UP (ref 0–6)
GLUCOSE SERPL-MCNC: 105 MG/DL — HIGH (ref 70–99)
HAPTOGLOB SERPL-MCNC: <20 MG/DL — LOW (ref 34–200)
HCT VFR BLD CALC: 16 % — CRITICAL LOW (ref 39–50)
HGB BLD-MCNC: 5.8 G/DL — CRITICAL LOW (ref 13–17)
IANC: 8.04 K/UL — HIGH (ref 1.8–7.4)
IMM GRANULOCYTES NFR BLD AUTO: 0.9 % — SIGNIFICANT CHANGE UP (ref 0–0.9)
LDH SERPL L TO P-CCNC: 598 U/L — HIGH (ref 135–225)
LYMPHOCYTES # BLD AUTO: 2.98 K/UL — SIGNIFICANT CHANGE UP (ref 1–3.3)
LYMPHOCYTES # BLD AUTO: 22.8 % — SIGNIFICANT CHANGE UP (ref 13–44)
MAGNESIUM SERPL-MCNC: 2 MG/DL — SIGNIFICANT CHANGE UP (ref 1.6–2.6)
MCHC RBC-ENTMCNC: 29.6 PG — SIGNIFICANT CHANGE UP (ref 27–34)
MCHC RBC-ENTMCNC: 36.3 G/DL — HIGH (ref 32–36)
MCV RBC AUTO: 81.6 FL — SIGNIFICANT CHANGE UP (ref 80–100)
MONOCYTES # BLD AUTO: 1.77 K/UL — HIGH (ref 0–0.9)
MONOCYTES NFR BLD AUTO: 13.5 % — SIGNIFICANT CHANGE UP (ref 2–14)
NEUTROPHILS # BLD AUTO: 8.04 K/UL — HIGH (ref 1.8–7.4)
NEUTROPHILS NFR BLD AUTO: 61.5 % — SIGNIFICANT CHANGE UP (ref 43–77)
NRBC # BLD: 0 /100 WBCS — SIGNIFICANT CHANGE UP (ref 0–0)
NRBC # FLD: 0 K/UL — SIGNIFICANT CHANGE UP (ref 0–0)
PHOSPHATE SERPL-MCNC: 3.1 MG/DL — SIGNIFICANT CHANGE UP (ref 2.5–4.5)
PLATELET # BLD AUTO: 302 K/UL — SIGNIFICANT CHANGE UP (ref 150–400)
POTASSIUM SERPL-MCNC: 4.3 MMOL/L — SIGNIFICANT CHANGE UP (ref 3.5–5.3)
POTASSIUM SERPL-SCNC: 4.3 MMOL/L — SIGNIFICANT CHANGE UP (ref 3.5–5.3)
PROT SERPL-MCNC: 6.4 G/DL — SIGNIFICANT CHANGE UP (ref 6–8.3)
RBC # BLD: 1.96 M/UL — LOW (ref 4.2–5.8)
RBC # BLD: 1.96 M/UL — LOW (ref 4.2–5.8)
RBC # FLD: 18.9 % — HIGH (ref 10.3–14.5)
RETICS #: 167.2 K/UL — HIGH (ref 25–125)
RETICS/RBC NFR: 8.5 % — HIGH (ref 0.5–2.5)
RH IG SCN BLD-IMP: POSITIVE — SIGNIFICANT CHANGE UP
SODIUM SERPL-SCNC: 135 MMOL/L — SIGNIFICANT CHANGE UP (ref 135–145)
WBC # BLD: 13.07 K/UL — HIGH (ref 3.8–10.5)
WBC # FLD AUTO: 13.07 K/UL — HIGH (ref 3.8–10.5)

## 2024-12-24 PROCEDURE — 36512 APHERESIS RBC: CPT

## 2024-12-24 PROCEDURE — 99233 SBSQ HOSP IP/OBS HIGH 50: CPT

## 2024-12-24 PROCEDURE — 99221 1ST HOSP IP/OBS SF/LOW 40: CPT | Mod: 25

## 2024-12-24 PROCEDURE — 99233 SBSQ HOSP IP/OBS HIGH 50: CPT | Mod: GC

## 2024-12-24 PROCEDURE — 99221 1ST HOSP IP/OBS SF/LOW 40: CPT

## 2024-12-24 PROCEDURE — 99232 SBSQ HOSP IP/OBS MODERATE 35: CPT

## 2024-12-24 RX ORDER — POLYETHYLENE GLYCOL 3350 17 G/DOSE
17 POWDER (GRAM) ORAL
Refills: 0 | Status: COMPLETED | OUTPATIENT
Start: 2024-12-24 | End: 2024-12-29

## 2024-12-24 RX ORDER — ERTAPENEM SODIUM 1 G/1
1000 INJECTION, POWDER, LYOPHILIZED, FOR SOLUTION INTRAMUSCULAR; INTRAVENOUS EVERY 24 HOURS
Refills: 0 | Status: COMPLETED | OUTPATIENT
Start: 2024-12-24 | End: 2024-12-31

## 2024-12-24 RX ORDER — SODIUM PHOSPHATE, DIBASIC, UNSPECIFIED FORM AND SODIUM PHOSPHATE, MONOBASIC, UNSPECIFIED FORM 7; 19 G/133ML; G/133ML
1 ENEMA RECTAL ONCE
Refills: 0 | Status: COMPLETED | OUTPATIENT
Start: 2024-12-24 | End: 2024-12-24

## 2024-12-24 RX ORDER — CALCIUM GLUCONATE 94 MG/ML
2 INJECTION, SOLUTION INTRAVENOUS ONCE
Refills: 0 | Status: DISCONTINUED | OUTPATIENT
Start: 2024-12-24 | End: 2025-01-03

## 2024-12-24 RX ORDER — SENNOSIDES 8.6 MG/1
2 TABLET, FILM COATED ORAL AT BEDTIME
Refills: 0 | Status: DISCONTINUED | OUTPATIENT
Start: 2024-12-24 | End: 2025-01-03

## 2024-12-24 RX ORDER — URSODIOL 250 MG/1
500 TABLET, FILM COATED ORAL
Refills: 0 | Status: DISCONTINUED | OUTPATIENT
Start: 2024-12-24 | End: 2025-01-03

## 2024-12-24 RX ADMIN — MEROPENEM 100 MILLIGRAM(S): 1 INJECTION, POWDER, FOR SOLUTION INTRAVENOUS at 13:07

## 2024-12-24 RX ADMIN — ERTAPENEM SODIUM 120 MILLIGRAM(S): 1 INJECTION, POWDER, LYOPHILIZED, FOR SOLUTION INTRAMUSCULAR; INTRAVENOUS at 21:36

## 2024-12-24 RX ADMIN — NIFEDIPINE 30 MILLIGRAM(S): 60 TABLET, EXTENDED RELEASE ORAL at 05:38

## 2024-12-24 RX ADMIN — ENOXAPARIN SODIUM 40 MILLIGRAM(S): 60 INJECTION INTRAVENOUS; SUBCUTANEOUS at 17:20

## 2024-12-24 RX ADMIN — ALLOPURINOL 50 MILLIGRAM(S): 100 TABLET ORAL at 13:05

## 2024-12-24 RX ADMIN — SENNOSIDES 2 TABLET(S): 8.6 TABLET, FILM COATED ORAL at 21:25

## 2024-12-24 RX ADMIN — CHLORHEXIDINE GLUCONATE 1 APPLICATION(S): 1.2 RINSE ORAL at 13:04

## 2024-12-24 RX ADMIN — SODIUM PHOSPHATE, DIBASIC, UNSPECIFIED FORM AND SODIUM PHOSPHATE, MONOBASIC, UNSPECIFIED FORM 1 ENEMA: 7; 19 ENEMA RECTAL at 17:20

## 2024-12-24 RX ADMIN — PANTOPRAZOLE 40 MILLIGRAM(S): 40 TABLET, DELAYED RELEASE ORAL at 05:38

## 2024-12-24 RX ADMIN — Medication 30 MILLILITER(S): at 19:21

## 2024-12-24 RX ADMIN — MEROPENEM 100 MILLIGRAM(S): 1 INJECTION, POWDER, FOR SOLUTION INTRAVENOUS at 06:10

## 2024-12-24 RX ADMIN — Medication 30 MILLILITER(S): at 18:20

## 2024-12-24 RX ADMIN — Medication 1 MILLIGRAM(S): at 17:20

## 2024-12-24 NOTE — CONSULT NOTE ADULT - SUBJECTIVE AND OBJECTIVE BOX
Patient:  TOLU VARGAS  [id number]    CHIEF COMPLAINT:    HPI:    44M hx of sickle cell anemia, prior acute chest, here initially after fall but later         PAST MEDICAL & SURGICAL HISTORY:  Sickle cell anemia      Gout      History of cholecystectomy          FAMILY HISTORY:  Family history of sickle cell trait (Father, Mother)    Patient's father is  (Father)    Patient's mother is  (Mother)        SOCIAL HISTORY:    Allergies    penicillin (Pruritus)  hydroxyurea (Other)  ceftriaxone (Anaphylaxis)  piperacillin-tazobactam (Urticaria)    Intolerances        HOME MEDICATIONS:    REVIEW OF SYSTEMS:  [ ] Unable to assess ROS because ______  [ ] Negative except as stated in HPI  CONSTITUTIONAL: No fever, chills, night sweats, or fatigue  EYES: No eye pain, visual disturbances, or discharge  ENMT:  No difficulty hearing, tinnitus, vertigo; No sinus or throat pain  NECK: No pain or stiffness  BREASTS: No pain, masses, or nipple discharge  RESPIRATORY: No cough, wheezing, or hemoptysis; No shortness of breath  CARDIOVASCULAR: No chest pain, palpitations, dizziness, or leg swelling  GASTROINTESTINAL: No abdominal or epigastric pain. No nausea, vomiting, or hematemesis; No diarrhea or constipation. No melena or hematochezia.  GENITOURINARY: No dysuria, frequency, hematuria, or incontinence  NEUROLOGICAL: No headaches, memory loss, loss of strength, numbness, or tremors  SKIN: No itching, burning, rashes, or lesions   LYMPH NODES: No enlarged glands  ENDOCRINE: No heat or cold intolerance; No hair loss  MUSCULOSKELETAL: No joint pain or swelling; No muscle, back, or extremity pain  PSYCHIATRIC: No depression, anxiety, mood swings, or difficulty sleeping  HEME/LYMPH: No easy bruising, or bleeding gums  ALLERGY AND IMMUNOLOGIC: No hives or eczema    OBJECTIVE:  T(F): 98.4 (24 @ 15:58), Max: 102.2 (24 @ 19:58)  HR: 95 (24 @ 15:58) (87 - 124)  BP: 115/70 (24 @ 15:58) (112/69 - 139/77)  BP(mean): --  ABP: --  ABP(mean): --  RR: 18 (24 @ 15:58) (17 - 18)  SpO2: 98% (24 @ 15:58) (92% - 100%)  CVP(mm Hg): --    I/O Summary 24H    IN: 0 mL / OUT: 725 mL / NET: -725 mL      CAPILLARY BLOOD GLUCOSE          PHYSICAL EXAM:  GENERAL: NAD, lying in bed comfortably  HEAD:  Atraumatic, Normocephalic  EYES: EOMI, PERRLA, conjunctiva and sclera clear  ENT: Moist mucous membranes  NECK: Supple, No JVD  CHEST/LUNG: Clear to auscultation bilaterally; No rales, rhonchi, wheezing, or rubs. Unlabored respirations  HEART: Regular rate and rhythm; No murmurs, rubs, or gallops  ABDOMEN: Bowel sounds present; Soft, Nontender, Nondistended. No hepatomegaly  EXTREMITIES:  2+ Peripheral Pulses, brisk capillary refill. No clubbing, cyanosis, or edema  NERVOUS SYSTEM:  Alert & Oriented X3, speech clear. No deficits   MSK: FROM all 4 extremities, full and equal strength  SKIN: No rashes or lesions    HOSPITAL MEDICATIONS:  MEDICATIONS  (STANDING):  allopurinol 50 milliGRAM(s) Oral daily  calcium gluconate IVPB 2 Gram(s) IV Intermittent once  chlorhexidine 2% Cloths 1 Application(s) Topical daily  enoxaparin Injectable 40 milliGRAM(s) SubCutaneous every 24 hours  ertapenem  IVPB 1000 milliGRAM(s) IV Intermittent every 24 hours  folic acid 1 milliGRAM(s) Oral every 24 hours  HYDROmorphone PCA (5 mG/mL) 30 milliLiter(s) PCA Continuous PCA Continuous  influenza   Vaccine 0.5 milliLiter(s) IntraMuscular once  NIFEdipine XL 30 milliGRAM(s) Oral daily  pantoprazole    Tablet 40 milliGRAM(s) Oral before breakfast  saline laxative (FLEET) Rectal Enema 1 Enema Rectal once  sodium chloride 0.45%. 1000 milliLiter(s) (30 mL/Hr) IV Continuous <Continuous>    MEDICATIONS  (PRN):  naloxone Injectable 0.1 milliGRAM(s) IV Push once PRN Opioid Overdose  ondansetron Injectable 4 milliGRAM(s) IV Push every 8 hours PRN Nausea and/or Vomiting      LABS:  CBC 24 @ 05:40                        5.8    13.07 )-----------( 302                   16.0     Hgb trend: 5.8 <-- , 5.7 <-- , 6.0 <--   WBC trend: 13.07 <-- , 14.49 <-- , 14.60 <--     CMP - @ 05:40    135  |  98  |  27[H]  ----------------------------<  105[H]  4.3   |  24  |  0.98    Ca    8.9      - @ 05:40  Phos  3.1       Mg     2.00     24    TPro  6.4  /  Alb  3.2[L]  /  TBili  13.2[H]  /  DBili  x   /  AST  124[H]  /  ALT  61[H]  /  AlkPhos  160[H]     1224    Serum Cr (eGFR) trend: 0.98 (97) <-- , 1.00 (95) <-- , 0.98 (97) <--         ABG Trend:     VBG Trend:       MICROBIOLOGY:       RADIOLOGY:  [ ] Reviewed and interpreted by me    EKG Patient:  TOLU VARGAS  [id number]    CHIEF COMPLAINT:    HPI:    44M hx of sickle cell anemia, prior acute chest, here initially after fall but found to have low Hgb and concern for sickle cell crisis. He received 2 units pRBC on  and  with recovery of Hgb and improvement in some hemolysis markers. However, his bilirubin began to worsen and heme/onc recommended MRCP to better evaluate his biliary tract. Hepatology was consulted for this and concern was for acute hepatic sickle cell crisis with concern that this was worsened by sepsis leading to cholestasis.     Of note, patient developed a new fever while inpatient and was found to have bacteremia with multiple organisms growing including Pantoea and Enterobacter, now on meropenem with ID following. Repeat cultures have been negative.    Patient was incidentally found to have a L renal pelvic mass seen on MRCP. CT ordered and no acute urologic intervention was required.    Patient overall without abd pain throughout this time and states that at first when he was admitted he felt generally fatigued, but this has resolved. Now been feeling stable for the past 3-4 days.      PAST MEDICAL & SURGICAL HISTORY:  Sickle cell anemia      Gout      History of cholecystectomy          FAMILY HISTORY:  Family history of sickle cell trait (Father, Mother)    Patient's father is  (Father)    Patient's mother is  (Mother)        SOCIAL HISTORY:    Allergies    penicillin (Pruritus)  hydroxyurea (Other)  ceftriaxone (Anaphylaxis)  piperacillin-tazobactam (Urticaria)    Intolerances        HOME MEDICATIONS:    REVIEW OF SYSTEMS:  No abd pain, eating drinking well, no fevers now, walking well, having regular BMs    OBJECTIVE:  T(F): 98.4 (24 @ 15:58), Max: 102.2 (24 @ 19:58)  HR: 95 (24 @ 15:58) (87 - 124)  BP: 115/70 (24 @ 15:58) (112/69 - 139/77)  BP(mean): --  ABP: --  ABP(mean): --  RR: 18 (24 @ 15:58) (17 - 18)  SpO2: 98% (24 @ 15:58) (92% - 100%)  CVP(mm Hg): --    I/O Summary 24H    IN: 0 mL / OUT: 725 mL / NET: -725 mL      CAPILLARY BLOOD GLUCOSE          PHYSICAL EXAM:  GENERAL: NAD, sitting up in bed comfortably  EYES: jaundiced eyes  ENT: Moist mucous membranes  NECK: Supple, No JVD  CHEST/LUNG: Clear to auscultation bilaterally; No rales, rhonchi, wheezing, or rubs. Unlabored respirations  HEART: Regular rate and rhythm; No murmurs, rubs, or gallops  ABDOMEN: Soft, Nontender, Nondistended. No hepatomegaly  EXTREMITIES: brisk capillary refill. No clubbing, cyanosis, or edema  NERVOUS SYSTEM:  Alert & Oriented X3, speech clear. No deficits   MSK: FROM all 4 extremities, full and equal strength  SKIN: No rashes or lesions    HOSPITAL MEDICATIONS:  MEDICATIONS  (STANDING):  allopurinol 50 milliGRAM(s) Oral daily  calcium gluconate IVPB 2 Gram(s) IV Intermittent once  chlorhexidine 2% Cloths 1 Application(s) Topical daily  enoxaparin Injectable 40 milliGRAM(s) SubCutaneous every 24 hours  ertapenem  IVPB 1000 milliGRAM(s) IV Intermittent every 24 hours  folic acid 1 milliGRAM(s) Oral every 24 hours  HYDROmorphone PCA (5 mG/mL) 30 milliLiter(s) PCA Continuous PCA Continuous  influenza   Vaccine 0.5 milliLiter(s) IntraMuscular once  NIFEdipine XL 30 milliGRAM(s) Oral daily  pantoprazole    Tablet 40 milliGRAM(s) Oral before breakfast  saline laxative (FLEET) Rectal Enema 1 Enema Rectal once  sodium chloride 0.45%. 1000 milliLiter(s) (30 mL/Hr) IV Continuous <Continuous>    MEDICATIONS  (PRN):  naloxone Injectable 0.1 milliGRAM(s) IV Push once PRN Opioid Overdose  ondansetron Injectable 4 milliGRAM(s) IV Push every 8 hours PRN Nausea and/or Vomiting      LABS:  CBC 24 @ 05:40                        5.8    13.07 )-----------( 302                   16.0     Hgb trend: 5.8 <-- , 5.7 <-- , 6.0 <--   WBC trend: 13.07 <-- , 14.49 <-- , 14.60 <--     CMP 24 @ 05:40    135  |  98  |  27[H]  ----------------------------<  105[H]  4.3   |  24  |  0.98    Ca    8.9      24-24 @ 05:40  Phos  3.1     12-  Mg     2.00     12-    TPro  6.4  /  Alb  3.2[L]  /  TBili  13.2[H]  /  DBili  x   /  AST  124[H]  /  ALT  61[H]  /  AlkPhos  160[H]     12-24    Serum Cr (eGFR) trend: 0.98 (97) <-- , 1.00 (95) <-- , 0.98 (97) <--         ABG Trend:     VBG Trend:       MICROBIOLOGY:   Bcx positive on

## 2024-12-24 NOTE — CONSULT NOTE ADULT - ASSESSMENT
Impression:   44 year old male with hx sickle cell disease w/ multiple admissions for sickle cell crisis/acute chest syndrome and dx of cirrhosis 2/2 secondary iron overload who presented w/ dyspnea, fall, and acute pain. Hospital course c/w fevers 2/2 bacteremia 2/2 Pantjatinder Santizoa currently on meropenem w/ ID following, MATA w/ improving renal function, LE edema and severe pain currently on PCA Dilaudid, currently has progressive hyperbilirubinemia, so hepatology was consulted.     #Hyperbilirubinemia  #Compensated cirrhosis 2/2 secondary iron overload due to multiple blood transfusions  Calculated MELD 3 score on 12/24 - 20. Reported he was dx with cirrhosis 11/2024, does not see hepatologist, was informed by his hematologist. No prior liver biopsy, based on imaging. Patient has normal platelets and INR but has chronic elevation of bilirubin around 5-7. During this admission, bilirubin has been progressively increasing to 13, likely multifactorial. Differentials include acute hepatic sickle cell crisis, sepsis of cholestasis in the setting of underlying cirrhosis. MRCP showed no biliary obstruction. No prior testing for HFE mutation completed, its non HFE related HH. Would be useful to obtain the iron content in hepatocytes with either a liver biopsy or MRI. Patient does have underlying chronic liver disease due to secondary iron overload from multiple blood transfusions and possible chronic cholestasis related to sickling process.   - Ascites: None seen on MRCP imaging  - Varices: no prior EGD report.   - HE: AAOx3, not on medications.   - HCC: No lesions seen on MRCP.   Previously had US doppler in 10/2024 which was normal. Also had CT A/P which showed ascites but no cirrhosis  - Iron studies: Iron 428, Iron sat 79%, ferritin 3000, hemoglobin S 21%.   - Other lab serologies: hep B surface Ag neg, hep B DNA neg, hep C neg. OSMANY and ASA neg    Recommendations:   - Bilirubin elevation is multifactorial w/ acute hepatic sickle cell crisis, sepsis of cholestasis in the setting of underlying cirrhosis due to secondary iron overload. Will need to continue w/ Abx for infection.   - Start him on ursodiol 500mg BID.   - planning to proceed w/ plasma exchange which might decrease his bilirubin if part of the elevation is due to sickle cell vaso-occlusive crisis.   - Patient will eventually need MRI of the liver to detect iron measurements, R2-MRI as an o/p. High risk for liver biopsy which could lead to hemorrhage.   - Trend CMP, CBC and PT/INR daily.     Hepatology will continue to follow.     All recommendations are tentative until note is attested by an attending.     Eamon Lemon, PGY-6  Gastroenterology/Hepatology Fellow  Available on [employer] Teams  [phone] (Short Range Pager)  [phone] (Long Range Pager)    After 5pm, please contact the on-call GI fellow.

## 2024-12-24 NOTE — CHART NOTE - NSCHARTNOTEFT_GEN_A_CORE
NUTRITION FOLLOW UP NOTE    Pt seen for follow-up.     SOURCE: [X] Patient [X] Medical record [] RN/PCA [] Family/Caregiver []Patient unavailable []Patient inappropriate (disoriented, nonverbal, intubated/sedated) [] Other:    Medical Course: Per chart, Pt is 44 year old male with history of sickle cell anemia presenting today s/p mechanical fall      Diet Prescription: Diet, Regular:   Low Sodium (12-16-24 @ 14:24)      Nutrition Course: Pt seen at bedside. Pt reported good PO intake in house. Per RN flowsheet, Pt with 51-75% noted. No food preferences vocalized at this time. Pt declined nutritional supplement. Denies chewing/swallowing difficulties. No GI distress reported i.e. nausea, vomiting, diarrhea. Per Pt, last BM yesterday 12/23. Not noted to be on a bowel regimen. Pt noted with bilateral ankle/foot edema 2+ noted. No new weight to assess. Please obtain daily weight to monitor weight trend. Renal Electrolytes within normal limits. Ordered for folic acid for micronutrient coverage. RD remains available upon request and will follow up per protocol.       Pertinent Medications: MEDICATIONS  (STANDING):  allopurinol 50 milliGRAM(s) Oral daily  chlorhexidine 2% Cloths 1 Application(s) Topical daily  enoxaparin Injectable 40 milliGRAM(s) SubCutaneous every 24 hours  folic acid 1 milliGRAM(s) Oral every 24 hours  HYDROmorphone PCA (5 mG/mL) 30 milliLiter(s) PCA Continuous PCA Continuous  influenza   Vaccine 0.5 milliLiter(s) IntraMuscular once  meropenem  IVPB 1000 milliGRAM(s) IV Intermittent every 8 hours  NIFEdipine XL 30 milliGRAM(s) Oral daily  pantoprazole    Tablet 40 milliGRAM(s) Oral before breakfast  sodium chloride 0.45%. 1000 milliLiter(s) (30 mL/Hr) IV Continuous <Continuous>    MEDICATIONS  (PRN):  naloxone Injectable 0.1 milliGRAM(s) IV Push once PRN Opioid Overdose  ondansetron Injectable 4 milliGRAM(s) IV Push every 8 hours PRN Nausea and/or Vomiting    [] Relevant notes on medications:     Pertinent Labs: 12-24 Na135 mmol/L Glu 105 mg/dL[H] K+ 4.3 mmol/L Cr  0.98 mg/dL BUN 27 mg/dL[H] 12-24 Phos 3.1 mg/dL 12-24 Alb 3.2 g/dL[L]    Weight: 87.1kg (dosing wt 12/8)  Weight Assessment: No new weight to assess  Height: 69in / 175.3cm  IBW: 160lbs / 72.5kg +/-10%  BMI: 28.3 kg/m^2    Physical Assessment, per flowsheets:  Edema: Bilateral ankle/foot edema 2+ noted.  Pressure Injury: No pressure injury noted.     Estimated Needs:   [X] No change since previous assessment, based on IBW  72.5kg  1812 - 2175 kcal daily @ 25 - 30 kcal/kg, 73 - 87 gm protein daily @ 1.0 - 1.2 gm/kg       Previous Nutrition Diagnosis:   [X] Altered Nutrition Related Lab Values  Nutrition Diagnosis is [ ] ongoing  [X] resolved [ ] not applicable   New Nutrition Diagnosis: [X] not applicable     Education:  [ ] Provided pt with verbal / written education on   [ ] Provided on previous assessment by RD  [X] Not applicable   [ ] Pt refused     Interventions:   1) - Continue current diet order, which remains appropriate at this time.   2) - Encourage and assist Pt with meals, Monitor PO diet tolerance.   3) - Obtain daily weight to monitor weight trend.   4) - RDN services remain available as needed.     Monitor & Evaluate:  PO intake, tolerance to diet/supplement, nutrition related lab values, weight trends, BMs/GI distress, hydration status, skin integrity.    Chandler Valadez RD, CDN. Available on [facility] teams,  Pager #[phone]

## 2024-12-24 NOTE — CONSULT NOTE ADULT - SUBJECTIVE AND OBJECTIVE BOX
HPI:    Mr. Downey is a 44 year old male with hx sickle cell disease w/ multiple admissions for sickle cell crisis/acute chest syndrome and dx of cirrhosis 2/2 secondary iron overload who presented w/ dyspnea, fall, and acute pain. 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 Patient reported he was told by his primary oncologist last month that he has cirrhosis 2/2 secondary iron overload but has not seen a hepatologist yet. Patient reported he has been getting iron transfusions every 2 months or even less sometimes for the past 10 years. His last plasma exchange was several  years ago. Patient had EGD many years ago which was normal, no records. No prior history of colonoscopy. He has no fam hx of liver disease or cancer. Denied alcohol, drug and smoking. Denied taking herbal supplements. At baseline, patient has bilirubin 5-6 w/ elevated liver enzymes. However, his bilirubin has been increasing now to 13. MRCP showed no biliary obstruction, no ascites and has cirrhosis. Hepatology consulted for hyperbilirubinemia.     Allergies:  penicillin (Pruritus)  hydroxyurea (Other)  ceftriaxone (Anaphylaxis)  piperacillin-tazobactam (Urticaria)      Home Medications:  allopurinol 100 mg oral tablet: 0.5 tab(s) orally once a day  folic acid 1 mg oral tablet: 1 tab(s) orally every 24 hours  Jadenu 360 mg oral tablet: 2 tab(s) orally once a day  omeprazole 20 mg oral delayed release capsule: 1 cap(s) orally once a day  oxyCODONE 30 mg oral tablet: 1 tab(s) orally every 4 hours as needed for  severe pain  polyethylene glycol 3350 oral powder for reconstitution: 17 gram(s) orally once a day  senna leaf extract oral tablet: 2 tab(s) orally once a day (at bedtime) Do not use if you develop diarrhea    Hospital Medications:  allopurinol 50 milliGRAM(s) Oral daily  chlorhexidine 2% Cloths 1 Application(s) Topical daily  enoxaparin Injectable 40 milliGRAM(s) SubCutaneous every 24 hours  folic acid 1 milliGRAM(s) Oral every 24 hours  HYDROmorphone PCA (5 mG/mL) 30 milliLiter(s) PCA Continuous PCA Continuous  influenza   Vaccine 0.5 milliLiter(s) IntraMuscular once  meropenem  IVPB 1000 milliGRAM(s) IV Intermittent every 8 hours  naloxone Injectable 0.1 milliGRAM(s) IV Push once PRN  NIFEdipine XL 30 milliGRAM(s) Oral daily  ondansetron Injectable 4 milliGRAM(s) IV Push every 8 hours PRN  pantoprazole    Tablet 40 milliGRAM(s) Oral before breakfast  sodium chloride 0.45%. 1000 milliLiter(s) IV Continuous <Continuous>      PMHX/PSHX:  Sickle cell anemia    Gout    Anemia requiring transfusions    Marijuana abuse    Pneumonia    History of cholecystectomy    Family history:  Family history of sickle cell trait (Father, Mother)    Patient's father is  (Father)    Patient's mother is  (Mother)    Denies family history of colon cancer/polyps, stomach cancer/polyps, pancreatic cancer/masses, liver cancer/disease, ovarian cancer and endometrial cancer.    Social History:   Tob: Denies  EtOH: Denies  Illicit Drugs: Denies    ROS:     General:  No wt loss, fevers, chills, night sweats, fatigue  Eyes:  Good vision, no reported pain  ENT:  No sore throat, pain, runny nose, dysphagia  CV:  No pain, palpitations, hypo/hypertension  Pulm:  No dyspnea, cough, tachypnea, wheezing  GI:  see HPI  :  No pain, bleeding, incontinence, nocturia  Muscle:  No pain, weakness  Neuro:  No weakness, tingling, memory problems  Psych:  No fatigue, insomnia, mood problems, depression  Endocrine:  No polyuria, polydipsia, cold/heat intolerance  Heme:  No petechiae, ecchymosis, easy bruisability  Skin:  No rash, tattoos, scars, edema    PHYSICAL EXAM:     GENERAL:  No acute distress, young male, sitting on the bed, jaundice appearing.   HEENT:  NCAT, + scleral icterus.   CHEST:  mild respiratory distress  HEART:  Regular rate and rhythm  ABDOMEN:  Soft, non-tender, non-distended,   EXTREMITIES: Trace pitting edema in LE b/l.   SKIN:  No rash/erythema/ecchymoses/petechiae/wounds/abscess/warm/dry  NEURO:  Alert and oriented x 3, no asterixis    Vital Signs:  Vital Signs Last 24 Hrs  T(C): 36.9 (24 Dec 2024 11:58), Max: 39 (23 Dec 2024 19:58)  T(F): 98.5 (24 Dec 2024 11:58), Max: 102.2 (23 Dec 2024 19:58)  HR: 102 (24 Dec 2024 11:58) (87 - 124)  BP: 113/66 (24 Dec 2024 11:58) (112/69 - 139/77)  BP(mean): --  RR: 18 (24 Dec 2024 11:58) (17 - 18)  SpO2: 99% (24 Dec 2024 11:58) (92% - 100%)    Parameters below as of 24 Dec 2024 11:58  Patient On (Oxygen Delivery Method): nasal cannula  O2 Flow (L/min): 2    Daily     Daily     LABS:                        5.8    13.07 )-----------( 302      ( 24 Dec 2024 05:40 )             16.0     Mean Cell Volume: 81.6 fL (- @ 05:40)        135  |  98  |  27[H]  ----------------------------<  105[H]  4.3   |  24  |  0.98    Ca    8.9      24 Dec 2024 05:40  Phos  3.1       Mg     2.00         TPro  6.4  /  Alb  3.2[L]  /  TBili  13.2[H]  /  DBili  x   /  AST  124[H]  /  ALT  61[H]  /  AlkPhos  160[H]  12-24    LIVER FUNCTIONS - ( 24 Dec 2024 05:40 )  Alb: 3.2 g/dL / Pro: 6.4 g/dL / ALK PHOS: 160 U/L / ALT: 61 U/L / AST: 124 U/L / GGT: x             Urinalysis Basic - ( 24 Dec 2024 05:40 )    Color: x / Appearance: x / SG: x / pH: x  Gluc: 105 mg/dL / Ketone: x  / Bili: x / Urobili: x   Blood: x / Protein: x / Nitrite: x   Leuk Esterase: x / RBC: x / WBC x   Sq Epi: x / Non Sq Epi: x / Bacteria: x                              5.8    13.07 )-----------( 302      ( 24 Dec 2024 05:40 )             16.0                         5.7    14.49 )-----------( 282      ( 23 Dec 2024 06:00 )             16.4                         6.0    14.60 )-----------( 335      ( 22 Dec 2024 06:44 )             16.8

## 2024-12-24 NOTE — PROGRESS NOTE ADULT - PROBLEM SELECTOR PLAN 2
Pt spiking fever, new CXR showed opacities ( neg CXR x 3 in the past few days), high suspicion for acute chest  Patient reports hg baseline is ~ 5.5  -Will have Heme eval for exhange transfusion eval given high suspicion for Acute chest Syndrome and hx of it in the past  - C/w dilaudid PCA, monitor pain- improving, continue to wean as able  - Bowel regimen with Senna daily, miralax prn  -- Patient follows Dr Jordan of [facility] - appreciate hem/onc input here, s/p 2 unit pRBC this admission. Trend Hg.   - Pt on Jadenu at home, discussed with Heme onc, hold at this time, plan to resume at time of discharge. Pt spiking fever, new CXR showed opacities ( neg CXR x 3 in the past few days), high suspicion for acute chest,   Patient reports hg baseline is ~ 5.5  -Discussed with Mayuri, plan for exhange transfusion eval given high suspicion for Acute chest Syndrome and liver involvement  - C/w dilaudid PCA, monitor pain- improving, continue to wean as able  - Bowel regimen with Senna daily, miralax prn  -- Patient follows Dr Jordan of [facility] - appreciate hem/onc input here, s/p 2 unit pRBC this admission. Trend Hg.   - Pt on Jadenu at home, discussed with Mayuri onc, hold at this time, plan to resume at time of discharge.

## 2024-12-24 NOTE — CONSULT NOTE ADULT - ASSESSMENT
44M with history of sickle cell disease with episodes of sickle cell crisis/acute chest syndrome, cirrhosis due to iron overload admitted for SOB and fall. Course complicated by bacteremia and sepsis, MATA. Concern that septic source is cholestasis due to iron overload. Vascular surgery consulted for Shiley placement for exchange transfusion.     Recs:  - Vascular surgery team to place Shiley at bedside  - Patient requesting to have BM prior to placement, will return later. Primary team aware  - Procedure note to follow placement  - Remainder of care per primary    Discussed with on call fellow on behalf of Dr. Altagracia [facility] Team Surgery  [phone] 44M with history of sickle cell disease with episodes of sickle cell crisis/acute chest syndrome, cirrhosis due to iron overload admitted for SOB and fall. Course complicated by bacteremia and sepsis, MATA. Continues with hyperbilirubinemia (t bili 13.2 12/24), possibly in part due to vasoocclusive crisis. Vascular surgery consulted for Shiley placement for exchange transfusion.     Recs:  - Vascular surgery team to place Shiley at bedside  - Patient requesting to have BM prior to placement, will return later. Primary team aware  - Procedure note to follow placement  - Remainder of care per primary    Discussed with on call fellow on behalf of Dr. Altagracia MA[facility] Team Surgery  [phone]

## 2024-12-24 NOTE — CHART NOTE - NSCHARTNOTEFT_GEN_A_CORE
Vascular surgery team went to bedside to place Shiley for RBC exchange transfusion. Patient NAD, hemodynamically stable, however complaining of urge to defecate but constipated. Patient declines line placement at this time, requesting deferral until able to have a BM.     Vascular team will attempt to place later this evening.    Discussed with primary team        C Team Surgery  [phone]

## 2024-12-24 NOTE — PROGRESS NOTE ADULT - ASSESSMENT
44 m with sickle cell disease with a port and previous staph epi bacteremia  admitted 12/9 after mechanical fall.  noted to be anemic; received a blood transfusion 12/13 and 12/18  CTA no PE  Fever 101 on 12/17  WBC increased   blood cultures 12/17 negative  fever gain to 102.3 on 12/20  blood cx 12/20 with pantoa    meropenem 12/20-    sickle cell with port and previous staph epi bacteremia now admitted with fall and then crisis    new fever in the hospital; pantoea bacteremia    port with no tenderness or erythema doubt source     suspect hospital acquired bacteremia  repeat blood cultures no growth x 48 h      h/o penicillin allergy    *  de escalate meropenem to ertapenem 1 gm iv q 24 h

## 2024-12-24 NOTE — PROGRESS NOTE ADULT - SUBJECTIVE AND OBJECTIVE BOX
Patient is a 44y old  Male who presents with a chief complaint of mech fall, pain, dyspnea (23 Dec 2024 17:12)  CXR repeated this AM, awaiting read, 2L NC, encouraging use of IS  Hgb 5.8, Repeat blood cx from 12/22 have cleared  He remains on abx and is closely followed by ID  PCA pump      MEDICATIONS  (STANDING):  allopurinol 50 milliGRAM(s) Oral daily  chlorhexidine 2% Cloths 1 Application(s) Topical daily  enoxaparin Injectable 40 milliGRAM(s) SubCutaneous every 24 hours  folic acid 1 milliGRAM(s) Oral every 24 hours  HYDROmorphone PCA (5 mG/mL) 30 milliLiter(s) PCA Continuous PCA Continuous  influenza   Vaccine 0.5 milliLiter(s) IntraMuscular once  meropenem  IVPB 1000 milliGRAM(s) IV Intermittent every 8 hours  NIFEdipine XL 30 milliGRAM(s) Oral daily  pantoprazole    Tablet 40 milliGRAM(s) Oral before breakfast  sodium chloride 0.45%. 1000 milliLiter(s) (30 mL/Hr) IV Continuous <Continuous>    MEDICATIONS  (PRN):  naloxone Injectable 0.1 milliGRAM(s) IV Push once PRN Opioid Overdose  ondansetron Injectable 4 milliGRAM(s) IV Push every 8 hours PRN Nausea and/or Vomiting        Vital Signs Last 24 Hrs  T(C): 36.9 (24 Dec 2024 07:58), Max: 39 (23 Dec 2024 19:58)  T(F): 98.4 (24 Dec 2024 07:58), Max: 102.2 (23 Dec 2024 19:58)  HR: 105 (24 Dec 2024 07:58) (87 - 124)  BP: 139/77 (24 Dec 2024 07:58) (112/69 - 139/77)  BP(mean): --  RR: 18 (24 Dec 2024 07:58) (17 - 18)  SpO2: 99% (24 Dec 2024 07:58) (92% - 100%)    Parameters below as of 24 Dec 2024 07:58  Patient On (Oxygen Delivery Method): nasal cannula  O2 Flow (L/min): 3      PE  NAD  Awake, alert  Anicteric, MMM  RRR  CTAB  Abd soft, NT, ND  No c/c/e  No rash grossly                            5.8    13.07 )-----------( 302      ( 24 Dec 2024 05:40 )             16.0       12-24    135  |  98  |  27[H]  ----------------------------<  105[H]  4.3   |  24  |  0.98    Ca    8.9      24 Dec 2024 05:40  Phos  3.1     12-24  Mg     2.00     12-24    TPro  6.4  /  Alb  3.2[L]  /  TBili  13.2[H]  /  DBili  x   /  AST  124[H]  /  ALT  61[H]  /  AlkPhos  160[H]  12-24

## 2024-12-24 NOTE — PROGRESS NOTE ADULT - PROBLEM SELECTOR PLAN 1
Fever to 101.2 at 21:35 on 12/17/24. Reported to have O2 sat in 80s With leukocytosis. focal infectious symptoms. Recent staph epi bacteremia during November admission here, seen by ID at that time. Patient has port in place,   - -blood culture with Enterobacterales and pantoea, ID reconsulted, rec to cont meropenem and rpt blood cultures, f/u blood cultures from 12/22, per ID and heme check MRCP  - Still spiking fever with leukocytosis, f/u ID  - ID consulted, recs appreciated. No new focal infectious symptoms. Started meropenem at 6:46am 12/20/24. F/u additional ID recs.  - ?Pantoea from the rolling paper/marijuana he uses could be a source. Fever to 101.2 at 21:35 on 12/17/24. Reported to have O2 sat in 80s With leukocytosis. focal infectious symptoms. Recent staph epi bacteremia during November admission here, seen by ID at that time. Patient has port in place,   - -blood culture with Enterobacterales and pantoea, ID reconsulted, rec to cont meropenem and rpt blood cultures, f/u blood cultures from 12/22, per ID and heme check MRCP  - Still spiking fever intermittently, f/u ID  - ID consulted, recs appreciated. No new focal infectious symptoms. Started meropenem at 6:46am 12/20/24. F/u additional ID recs.  - ?Pantoea from the rolling paper/marijuana he uses could be a source.

## 2024-12-24 NOTE — PROGRESS NOTE ADULT - SUBJECTIVE AND OBJECTIVE BOX
[facility] Division of Hospital Medicine  Tony Mann MD  Pager [phone]      Patient is a 44y old  Male [facility] a [facility], [facility] (24 Dec 2024 08:53)      SUBJECTIVE / OVERNIGHT EVENTS: Pt endorses lower back pain, similar to what he feels before he "crashes". No SOB, CP    MEDICATIONS  (STANDING):  allopurinol 50 milliGRAM(s) Oral daily  chlorhexidine 2% Cloths 1 Application(s) Topical daily  enoxaparin Injectable 40 milliGRAM(s) SubCutaneous every 24 hours  folic acid 1 milliGRAM(s) Oral every 24 hours  HYDROmorphone PCA (5 mG/mL) 30 milliLiter(s) PCA Continuous PCA Continuous  influenza   Vaccine 0.5 milliLiter(s) IntraMuscular once  meropenem  IVPB 1000 milliGRAM(s) IV Intermittent every 8 hours  NIFEdipine XL 30 milliGRAM(s) Oral daily  pantoprazole    Tablet 40 milliGRAM(s) Oral before breakfast  sodium chloride 0.45%. 1000 milliLiter(s) (30 mL/Hr) IV Continuous <Continuous>    MEDICATIONS  (PRN):  naloxone Injectable 0.1 milliGRAM(s) IV Push once PRN Opioid Overdose  ondansetron Injectable 4 milliGRAM(s) IV Push every 8 hours PRN Nausea and/or Vomiting      CAPILLARY BLOOD GLUCOSE        I&O's Summary    23 Dec 2024 07:01  -  24 Dec 2024 07:00  --------------------------------------------------------  IN: 0 mL / OUT: 725 mL / NET: -725 mL        PHYSICAL EXAM:  Vital Signs Last 24 Hrs  T(C): 36.9 (24 Dec 2024 11:58), Max: 39 (23 Dec 2024 19:58)  T(F): 98.5 (24 Dec 2024 11:58), Max: 102.2 (23 Dec 2024 19:58)  HR: 102 (24 Dec 2024 11:58) (87 - 124)  BP: 113/66 (24 Dec 2024 11:58) (112/69 - 139/77)  BP(mean): --  RR: 18 (24 Dec 2024 11:58) (17 - 18)  SpO2: 99% (24 Dec 2024 11:58) (92% - 100%)    Parameters below as of 24 Dec 2024 11:58  Patient On (Oxygen Delivery Method): nasal cannula  O2 Flow (L/min): 2    CONSTITUTIONAL: NAD  EYES: conjunctiva and sclera clear  ENMT: mmm  NECK: Supple,  RESPIRATORY: Normal respiratory effort; lungs are clear to auscultation bilaterally  CARDIOVASCULAR: Regular rate and rhythm, + S1 and S2, LE edema, slowly improving  ABDOMEN: Nontender to palpation, normoactive bowel sounds, no rebound/guarding  PSYCH: A+O x 3    LABS:                        5.8    13.07 )-----------( 302      ( 24 Dec 2024 05:40 )             16.0     12-24    135  |  98  |  27[H]  ----------------------------<  105[H]  4.3   |  24  |  0.98    Ca    8.9      24 Dec 2024 05:40  Phos  3.1     12-24  Mg     2.00     12-24    TPro  6.4  /  Alb  3.2[L]  /  TBili  13.2[H]  /  DBili  x   /  AST  124[H]  /  ALT  61[H]  /  AlkPhos  160[H]  12-24          Urinalysis Basic - ( 24 Dec 2024 05:40 )    Color: x / Appearance: x / SG: x / pH: x  Gluc: 105 mg/dL / Ketone: x  / Bili: x / Urobili: x   Blood: x / Protein: x / Nitrite: x   Leuk Esterase: x / RBC: x / WBC x   Sq Epi: x / Non Sq Epi: x / Bacteria: x        Culture - Blood (collected 22 Dec 2024 13:30)  Source: .Blood BLOOD  Preliminary Report (23 Dec 2024 17:01):    No growth at 24 hours    Culture - Blood (collected 22 Dec 2024 12:08)  Source: .Blood BLOOD  Preliminary Report (23 Dec 2024 17:01):    No growth at 24 hours

## 2024-12-24 NOTE — CONSULT NOTE ADULT - SUBJECTIVE AND OBJECTIVE BOX
VASCULAR SURGERY CONSULT NOTE    HPI:  44M with history of sickle cell disease with episodes of sickle cell crisis/acute chest syndrome, cirrhosis due to iron overload admitted for SOB and fall. Course complicated by bacteremia and sepsis, MATA. Concern that septic source is cholestasis due to iron overload. Vascular surgery consulted for Shiley placement for exchange transfusion.     VITALS & I/Os:  Vital Signs Last 24 Hrs  T(C): 36.9 (24 Dec 2024 11:58), Max: 39 (23 Dec 2024 19:58)  T(F): 98.5 (24 Dec 2024 11:58), Max: 102.2 (23 Dec 2024 19:58)  HR: 102 (24 Dec 2024 11:58) (87 - 124)  BP: 113/66 (24 Dec 2024 11:58) (112/69 - 139/77)  BP(mean): --  RR: 18 (24 Dec 2024 11:58) (17 - 18)  SpO2: 99% (24 Dec 2024 11:58) (92% - 100%)    Parameters below as of 24 Dec 2024 11:58  Patient On (Oxygen Delivery Method): nasal cannula  O2 Flow (L/min): 2      I&O's Summary    23 Dec 2024 07:01  -  24 Dec 2024 07:00  --------------------------------------------------------  IN: 0 mL / OUT: 725 mL / NET: -725 mL        Physical Exam:  General: NAD, male appearing stated age  Resp: Nonlabored breathing on RA  CV: Hemodynamically stable  Extremities: Grossly symmetric, WW      Laboratory:                        5.8    13.07 )-----------( 302      (  @ 05:40 )             16.0                         5.7    14.49 )-----------( 282      (  @ 06:00 )             16.4                                  Phos: 3.1 M.00  135  |  98  |  27  ----------------------------<  105  4.3   |  24  |  0.98        ,                              Phos: 2.7 M.40  133  |  98  |  24  ----------------------------<  112  4.6   |  25  |  1.00             TPro 6.4 / Alb 3.2[L] / TBili 13.2[H] / DBili x  / AST/[H]/61[H] / AlkPhos 160[H]     TPro 6.2 / Alb 3.3 / TBili 10.6[H] / DBili 8.3[H] / AST/[H]/66[H] / AlkPhos 166[H]    PT/INR/PTT - ( @ 22:40) PT: 13.3 sec; INR: 1.12 ratio ; PTT: 32.7 sec          Urinalysis Basic - ( 24 Dec 2024 05:40 )    Color: x / Appearance: x / SG: x / pH: x  Gluc: 105 mg/dL / Ketone: x  / Bili: x / Urobili: x   Blood: x / Protein: x / Nitrite: x   Leuk Esterase: x / RBC: x / WBC x   Sq Epi: x / Non Sq Epi: x / Bacteria: x           VASCULAR SURGERY CONSULT NOTE    HPI:  44M with history of sickle cell disease with episodes of sickle cell crisis/acute chest syndrome, cirrhosis due to iron overload admitted for SOB and fall. Course complicated by bacteremia and sepsis, MATA. Concern that septic source is cholestasis vs hospital aquired, now s/p MRCP negative for choledocho. Continues with hyperbilirubinemia (t bili 13.2 ), possibly in part due to vasoocclusive crisis. Vascular surgery consulted for Shiley placement for exchange transfusion.     VITALS & I/Os:  Vital Signs Last 24 Hrs  T(C): 36.9 (24 Dec 2024 11:58), Max: 39 (23 Dec 2024 19:58)  T(F): 98.5 (24 Dec 2024 11:58), Max: 102.2 (23 Dec 2024 19:58)  HR: 102 (24 Dec 2024 11:58) (87 - 124)  BP: 113/66 (24 Dec 2024 11:58) (112/69 - 139/77)  BP(mean): --  RR: 18 (24 Dec 2024 11:58) (17 - 18)  SpO2: 99% (24 Dec 2024 11:58) (92% - 100%)    Parameters below as of 24 Dec 2024 11:58  Patient On (Oxygen Delivery Method): nasal cannula  O2 Flow (L/min): 2      I&O's Summary    23 Dec 2024 07:01  -  24 Dec 2024 07:00  --------------------------------------------------------  IN: 0 mL / OUT: 725 mL / NET: -725 mL        Physical Exam:  General: NAD, male appearing stated age  Resp: Nonlabored breathing on RA  CV: Hemodynamically stable  Extremities: Grossly symmetric, WWP      Laboratory:                        5.8    13.07 )-----------( 302      (  @ 05:40 )             16.0                         5.7    14.49 )-----------( 282      (  @ 06:00 )             16.4                                  Phos: 3.1 M.00  135  |  98  |  27  ----------------------------<  105  4.3   |  24  |  0.98        ,                              Phos: 2.7 M.40  133  |  98  |  24  ----------------------------<  112  4.6   |  25  |  1.00             TPro 6.4 / Alb 3.2[L] / TBili 13.2[H] / DBili x  / AST/[H]/61[H] / AlkPhos 160[H]     TPro 6.2 / Alb 3.3 / TBili 10.6[H] / DBili 8.3[H] / AST/[H]/66[H] / AlkPhos 166[H]    PT/INR/PTT - ( @ 22:40) PT: 13.3 sec; INR: 1.12 ratio ; PTT: 32.7 sec          Urinalysis Basic - ( 24 Dec 2024 05:40 )    Color: x / Appearance: x / SG: x / pH: x  Gluc: 105 mg/dL / Ketone: x  / Bili: x / Urobili: x   Blood: x / Protein: x / Nitrite: x   Leuk Esterase: x / RBC: x / WBC x   Sq Epi: x / Non Sq Epi: x / Bacteria: x           VASCULAR SURGERY CONSULT NOTE    HPI:  44M with history of sickle cell disease with episodes of sickle cell crisis/acute chest syndrome, cirrhosis due to iron overload admitted for SOB and fall. Course complicated by bacteremia and sepsis, MATA. Concern that septic source is cholestasis vs hospital aquired, now s/p MRCP negative for choledocho. Continues with hyperbilirubinemia (t bili 13.2 ), possibly in part due to vasoocclusive crisis. Vascular surgery consulted for Shiley placement for exchange transfusion. Patient reports that his primary complaint right now is significant discomfort due to constipation and the urge to defecate.    VITALS & I/Os:  Vital Signs Last 24 Hrs  T(C): 36.9 (24 Dec 2024 11:58), Max: 39 (23 Dec 2024 19:58)  T(F): 98.5 (24 Dec 2024 11:58), Max: 102.2 (23 Dec 2024 19:58)  HR: 102 (24 Dec 2024 11:58) (87 - 124)  BP: 113/66 (24 Dec 2024 11:58) (112/69 - 139/77)  BP(mean): --  RR: 18 (24 Dec 2024 11:58) (17 - 18)  SpO2: 99% (24 Dec 2024 11:58) (92% - 100%)    Parameters below as of 24 Dec 2024 11:58  Patient On (Oxygen Delivery Method): nasal cannula  O2 Flow (L/min): 2      I&O's Summary    23 Dec 2024 07:01  -  24 Dec 2024 07:00  --------------------------------------------------------  IN: 0 mL / OUT: 725 mL / NET: -725 mL        Physical Exam:  General: NAD, male appearing stated age. Pacing near bed  Resp: Nonlabored breathing on NC  CV: Hemodynamically stable  Extremities: Grossly symmetric, WWP, normal station      Laboratory:                        5.8    13.07 )-----------( 302      (  @ 05:40 )             16.0                         5.7    14.49 )-----------( 282      (  @ 06:00 )             16.4                                  Phos: 3.1 M.00  135  |  98  |  27  ----------------------------<  105  4.3   |  24  |  0.98        ,                              Phos: 2.7 M.40  133  |  98  |  24  ----------------------------<  112  4.6   |  25  |  1.00             TPro 6.4 / Alb 3.2[L] / TBili 13.2[H] / DBili x  / AST/[H]/61[H] / AlkPhos 160[H]     TPro 6.2 / Alb 3.3 / TBili 10.6[H] / DBili 8.3[H] / AST/[H]/66[H] / AlkPhos 166[H]    PT/INR/PTT - ( @ 22:40) PT: 13.3 sec; INR: 1.12 ratio ; PTT: 32.7 sec          Urinalysis Basic - ( 24 Dec 2024 05:40 )    Color: x / Appearance: x / SG: x / pH: x  Gluc: 105 mg/dL / Ketone: x  / Bili: x / Urobili: x   Blood: x / Protein: x / Nitrite: x   Leuk Esterase: x / RBC: x / WBC x   Sq Epi: x / Non Sq Epi: x / Bacteria: x

## 2024-12-24 NOTE — PROGRESS NOTE ADULT - NS ATTEND AMEND GEN_ALL_CORE FT
Patient seen and examined with NP during rounds  I agree with her assessment and plan    Noted inc bilirubin today, MRCP no obstruction, noted sickle cell sequale  Concern for acute sickle crisis of the liver, with underlying iron overload, sepsis/bacteremia  Would proceed with exchange transfusion to a goal of Hb S<30 %  Please check HbS levels at baseline  appreciate follow up from ID and hepatology evaluation

## 2024-12-24 NOTE — CHART NOTE - NSCHARTNOTEFT_GEN_A_CORE
Consent obtained from patient for RBCX. Awaiting catheter placement.  [facility] RN aware of the procedure.  Plan: using 6 units PRBCs to an end hct 26% for the RBCX today.    Please call  when the line is in.

## 2024-12-24 NOTE — CONSULT NOTE ADULT - PROBLEM SELECTOR RECOMMENDATION 9
Pt with MATA iso acute on chronic sickle cell anemia and NSAID use. Baseline SCr <1. Admission labs notable for hgb 6.0, WBC 14k, SCr 1.89. Pt had CTA chest on 12/9 that showed no PE, no acute chest, but showed healed right rib fracture and his chronic cirrhosis. Pt received 1L NS bolus in ED and continued on 1/2 NS as maintenance. SCr peaked at 2.46 on 12/10/24 and then trended down to 0.99 on 12/16/24. On 12/17/24, his SCr began to trend up again to 1.38 and then 1.74 on 12/18/24. Of note, pt did receive one dose of IV Ketorolac 30mg on 12/17/24. Pt also had one recorded fever (T101.2F) on 12/17/24. Recommend decreasing IVF rate (as much as possible while on PCA pump) as pt appears hypervolemic. Monitor labs and UOP. Avoid further NSAIDs or IV contrast studies. Dose meds per eGFR.
Patient with concern for vasocclusive sickle cell crisis and worsening bilirubin since 12/18 at which point bilirubin was 5. Concern is for vasooclusive hepatopathy for which MRCP was ordered. MRCP showed cirrhotic morphology. Bilirubin worsened again and so patient was recommended for exchange transfusion by heme/onc and MICU initially consulted. IR consulted at first and was scheduled for procedure, but this was cancelled and later vascular surgery placed the shiley for transfusion. Patient overall stable appearing, but agree with exchange transfusion per hematology given worsening bilirubin.  - F/u vascular for shiley placement and heme for transfusion exchange    Reconsult MICU as needed for urgent shiley placement for urgent exchange transfusion. Not currently needing MICU.

## 2024-12-24 NOTE — CONSULT NOTE ADULT - SUBJECTIVE AND OBJECTIVE BOX
HPI  Patient is a 44M with a PMH of sickle cell, prior CTAs for acute chest who is admitted to medicine after mechanical fall, dyspnea and sickle cell crisis. Urology consulted for incidentally found Left renal pelvic mass seen on MRCP ordered for hyperbilirubinemia. Pt denies any previous urological history, states father had some "issue with kidney" but otherwise no other family history of  malignancies. Denies gross hematuria, difficulty urinating or flank pain.     PAST MEDICAL & SURGICAL HISTORY:  Sickle cell anemia      Gout      History of cholecystectomy          MEDICATIONS  (STANDING):  allopurinol 50 milliGRAM(s) Oral daily  chlorhexidine 2% Cloths 1 Application(s) Topical daily  enoxaparin Injectable 40 milliGRAM(s) SubCutaneous every 24 hours  folic acid 1 milliGRAM(s) Oral every 24 hours  HYDROmorphone PCA (5 mG/mL) 30 milliLiter(s) PCA Continuous PCA Continuous  influenza   Vaccine 0.5 milliLiter(s) IntraMuscular once  meropenem  IVPB 1000 milliGRAM(s) IV Intermittent every 8 hours  NIFEdipine XL 30 milliGRAM(s) Oral daily  pantoprazole    Tablet 40 milliGRAM(s) Oral before breakfast  sodium chloride 0.45%. 1000 milliLiter(s) (30 mL/Hr) IV Continuous <Continuous>    MEDICATIONS  (PRN):  naloxone Injectable 0.1 milliGRAM(s) IV Push once PRN Opioid Overdose  ondansetron Injectable 4 milliGRAM(s) IV Push every 8 hours PRN Nausea and/or Vomiting      FAMILY HISTORY:  Family history of sickle cell trait (Father, Mother)    Patient's father is  (Father)    Patient's mother is  (Mother)        Allergies    penicillin (Pruritus)  hydroxyurea (Other)  ceftriaxone (Anaphylaxis)  piperacillin-tazobactam (Urticaria)    Intolerances        SOCIAL HISTORY:    REVIEW OF SYSTEMS: Otherwise negative as stated in HPI    Physical Exam  Vital signs  T(C): 36.9 (24 @ 11:58), Max: 39 (24 @ 19:58)  HR: 102 (24 @ 11:58)  BP: 113/66 (24 @ 11:58)  SpO2: 99% (24 @ 11:58)  Wt(kg): --    Output    OUT:    Voided (mL): 725 mL  Total OUT: 725 mL    Total NET: -725 mL          Gen: NAD  GI: S/ND/NT  : Voiding  MSK: moves all 4 limbs spontaneously    LABS:       @ 05:40    WBC 13.07 / Hct 16.0  / SCr 0.98      @ 06:00    WBC 14.49 / Hct 16.4  / SCr 1.00         135  |  98  |  27[H]  ----------------------------<  105[H]  4.3   |  24  |  0.98    Ca    8.9      24 Dec 2024 05:40  Phos  3.1       Mg     2.00         TPro  6.4  /  Alb  3.2[L]  /  TBili  13.2[H]  /  DBili  x   /  AST  124[H]  /  ALT  61[H]  /  AlkPhos  160[H]        Urinalysis Basic - ( 24 Dec 2024 05:40 )    Color: x / Appearance: x / SG: x / pH: x  Gluc: 105 mg/dL / Ketone: x  / Bili: x / Urobili: x   Blood: x / Protein: x / Nitrite: x   Leuk Esterase: x / RBC: x / WBC x   Sq Epi: x / Non Sq Epi: x / Bacteria: x              RADIOLOGY:  < from: MR MRCP w/wo IV Cont (24 @ 19:03) >  ACC: [id number] EXAM:  MR MRCP WAW IC   ORDERED BY: MAICO KNAPP     PROCEDURE DATE:  2024          INTERPRETATION:  CLINICAL INFORMATION: Fever, elevated bilirubin,   bacteremia.    COMPARISON: CT chest, abdomen and pelvis from 10/25/2024.    CONTRAST/COMPLICATIONS:  IV Contrast: Gadavist  7 cc administered   0.5 cc discarded  Oral Contrast: NONE  .    PROCEDURE:  MRI of the abdomen was performed.  MRCP was performed.    FINDINGS:  LOWER CHEST: Small right pleural effusion, new from 2024.   Cardiomegaly.    LIVER: Cirrhotic morphology. Loss of signal on the in phase T1 sequence,   suggestive of iron deposition.  BILE DUCTS: Normal caliber. No choledocholithiasis.  GALLBLADDER: Cholecystectomy.  SPLEEN: Autosplenectomy.  PANCREAS: Within normal limits.  ADRENALS: Within normal limits.  KIDNEYS/URETERS: T2 hypointense bilateral renal cortex, suggestive of   renal hemosiderosis/iron deposition. No hydronephrosis. Simple and   proteinaceous/hemorrhagic cysts in bilateral kidneys.  Mildly enhancing 1.9 cm lesion the left renal pelvis (17/46).    VISUALIZED PORTIONS:  BOWEL: Within normal limits.  PERITONEUM: No ascites.  VESSELS: Atherosclerotic changes.  RETROPERITONEUM/LYMPH NODES: Upper abdominal adenopathy, some lymph nodes   are slightly increased in size from 10/25/2024. For reference a   peripancreatic lymph node measures 4.0 x 1.9 cm (4:18), previously 3.4 x   1.7 cm. Additionally, a periportal lymph node measures 2.7 x 1.9 cm   (4:20), previously 2.6 x 1.9 cm.  ABDOMINAL WALL: Within normal limits.  BONES: Compression deformities at T12 and L2 are unchanged.    IMPRESSION:  Small right pleural effusion is new from 2024.  No choledocholithiasis.    Cirrhotic hepatic morphology with iron deposition. No focal lesion.  Sequelae of sickle cell disease.  Upper abdominal adenopathy with some lymph nodes slightly increased in   size from 10/25/2024.    Left renal pelvic lesion measuring 1.9 cm with mild enhancement.   Differentials include urothelial malignancy. Recommend urology consult   and further evaluation with CT urogram.    Findings were discussed with KENAN Aiken 2024 11:05 AM by Dr. De Luna with read back confirmation.    --- End of Report ---          JOSHUA DE LUNA MD; Resident Radiologist  This document has been electronically signed.  GANGA SPENCER MD; Attending Radiologist  This document has been electronically signed. Dec 24 2024 11:14AM    < end of copied text >

## 2024-12-24 NOTE — CONSULT NOTE ADULT - ASSESSMENT
Patient is a 44M with a PMH of sickle cell, prior CTAs for acute chest who is admitted to medicine after mechanical fall, dyspnea and sickle cell crisis. Urology consulted for incidentally found Left renal pelvic mass seen on MRCP ordered for hyperbilirubinemia. Pt denies any previous urological history, states father had some "issue with kidney" but otherwise no other family history of  malignancies. Denies gross hematuria, difficulty urinating or flank pain.   WBC 13.07 / Hct 16.0  / SCr 0.98   U/A with large blood  MRCP with Left renal pelvic lesion measuring 1.9 cm with mild enhancement c/f urothelial malignancy.    Recommendations:   - No acute urological intervention indicated.  - CTU ordered.  - Cr wnl  - U/A with large blood  - Patient will need to follow up outpatient at [facility] with Dr. Currie for left renal pelvic mass.     Discussed with on call attending Dr. Fernie Carolina [facility]  [address] [place] [address]  [phone]

## 2024-12-24 NOTE — PROGRESS NOTE ADULT - ASSESSMENT
44 year old male with history of sickle cell anemia presenting today s/p mechanical fall    Sickle cell disease  - follows with Dr Jordan of [facility]  - Hgb 4.9 on 12/13, s/p 1u PRBCs 12/18/24. --> Hgb 6.0 today with improvement in hemolysis parameters except bilirubin   - chronic hemolysis, Iron overload  - pls limit transfusions, maintain Hgb 5.5  - Continue Jadenu, maximize pain control  - ongoing care after discharge  - continues to experience back pain, on Dilaudid PCA pump  - monitor sxs and cbc closely.     Fever  - CXR clear. CTA negative earlier in hospitalization for PE.   - Blood cultures 12/20, gram neg bacteremia has cleared  - Tmax 102.2, continues Meropenem  - Started meropenem 12/20 F/u additional ID recs.  -  LFTs improving, would fractionate bilirubin and would obtain further imaging   -s/p CXR this AM, awaiting read  -s/p MRCP awaiting read    Fall  -s/p mechanical fall, CTH negative  -admitted with rib pain and SOB on ambulation  -CTA without pulmonary embolism  -Defer pain management to medicine team    will continue to follow      Meghana Real NP  Hematology/Oncology  [facility]  [phone] (Office)  [phone] (Alt office)  Evenings and weekends please call MD on call or office       44 year old male with history of sickle cell anemia presenting today s/p mechanical fall    Sickle cell disease  - follows with Dr Jordan of [facility]  - Hgb 4.9 on 12/13, s/p 1u PRBCs 12/18/24. --> Hgb 6.0 today with improvement in hemolysis parameters except bilirubin   - chronic hemolysis, Iron overload  - pls limit transfusions, maintain Hgb 5.5  - Continue Jadenu, maximize pain control  - ongoing care after discharge  - continues to experience back pain, on Dilaudid PCA pump  - monitor sxs and cbc closely.     Fever  - CXR clear. CTA negative earlier in hospitalization for PE.   - Blood cultures 12/20, gram neg bacteremia has cleared  - Tmax 102.2, continues Meropenem  - Started meropenem 12/20 F/u additional ID recs.  -  LFTs improving, would fractionate bilirubin and would obtain further imaging   -s/p CXR this AM, awaiting read  -s/p MRCP shows small right pleural effusion is new from 12/9/2024. Cirrhotic hepatic morphology with iron deposition. No focal lesion. Sequelae of sickle cell disease. Upper abdominal adenopathy with some lymph nodes slightly increased in  size from 10/25/2024. Left renal pelvic lesion measuring 1.9 cm with mild enhancement.   -recommend urology consult    Fall  -s/p mechanical fall, CTH negative  -admitted with rib pain and SOB on ambulation  -CTA without pulmonary embolism  -Defer pain management to medicine team    will continue to follow      Meghana Real NP  Hematology/Oncology  [facility]  [phone] (Office)  [phone] (Alt office)  Evenings and weekends please call MD on call or office       44 year old male with history of sickle cell anemia presenting today s/p mechanical fall    Sickle cell disease  - follows with Dr Jordan of [facility]  - Hgb 4.9 on 12/13, s/p 1u PRBCs 12/18/24. --> Hgb 6.0 today with improvement in hemolysis parameters except bilirubin   - chronic hemolysis, Iron overload  - pls limit transfusions, maintain Hgb 5.5  - Continue Jadenu, maximize pain control  - ongoing care after discharge  - continues to experience back pain, on Dilaudid PCA pump  - monitor sxs and cbc closely.     Fever  - CXR clear. CTA negative earlier in hospitalization for PE.   - Blood cultures 12/20, gram neg bacteremia has cleared  - Tmax 102.2, continues Meropenem  - Started meropenem 12/20 F/u additional ID recs.  -  LFTs improving, would fractionate bilirubin and would obtain further imaging   -s/p CXR this AM, awaiting read  -s/p MRCPwith no obvious cause of elevated bili vs iron overload.  May consider vaso occlusive hepatopathy, recommend hepatology consult, check hemoglobin electrophoresis and would poceed with RBC exchange  -recommend urology consult    Fall  -s/p mechanical fall, CTH negative  -admitted with rib pain and SOB on ambulation  -CTA without pulmonary embolism  -Defer pain management to medicine team    will continue to follow      Meghana Real NP  Hematology/Oncology  [facility]  [phone] (Office)  [phone] (Alt office)  Evenings and weekends please call MD on call or office

## 2024-12-24 NOTE — PROGRESS NOTE ADULT - SUBJECTIVE AND OBJECTIVE BOX
Follow Up:  fever    Interval History/ROS:    feels okay       Allergies  penicillin (Pruritus)  hydroxyurea (Other)  ceftriaxone (Anaphylaxis)  piperacillin-tazobactam (Urticaria)        ANTIMICROBIALS:  meropenem  IVPB 1000 every 8 hours    MEDICATIONS  (STANDING):  allopurinol 50 daily  enoxaparin Injectable 40 every 24 hours  HYDROmorphone PCA (5 mG/mL) 30 PCA Continuous  influenza   Vaccine 0.5 once  NIFEdipine XL 30 daily  ondansetron Injectable 4 every 8 hours PRN  pantoprazole    Tablet 40 before breakfast      Vital Signs Last 24 Hrs  T(F): 98.5 (12-24-24 @ 11:58), Max: 102.2 (12-23-24 @ 19:58)  HR: 102 (12-24-24 @ 11:58)  BP: 113/66 (12-24-24 @ 11:58)  RR: 18 (12-24-24 @ 11:58)  SpO2: 99% (12-24-24 @ 11:58) (92% - 100%)    Physical Exam:  General:    NAD, non toxic, sitting side of bed  Respiratory:    no respiratory distress 3 L  abd:     soft,    no tenderness  :  no  daniel  Musculoskeletal:   no joint swelling  vascular: R chest port with no tenderness or erythema  Skin:    no rash                                              5.8    13.07 )-----------( 302      ( 24 Dec 2024 05:40 )             16.0 12-24    135  |  98  |  27  ----------------------------<  105  4.3   |  24  |  0.98  Ca    8.9      24 Dec 2024 05:40Phos  3.1     12-24Mg     2.00     12-24  TPro  6.4  /  Alb  3.2  /  TBili  13.2  /  DBili  x   /  AST  124  /  ALT  61  /  AlkPhos  160  12-24          MICROBIOLOGY:    Culture - Blood (12.22.24 @ 12:08)   Specimen Source: .Blood BLOOD  Culture Results:   No growth at 48 hours    Culture - Blood (12.22.24 @ 13:30)   Specimen Source: .Blood BLOOD  Culture Results:   No growth at 48 hours    .Blood BLOOD  12-20-24   Growth in aerobic and anaerobic bottles: Pantoea dispersa  See previous culture [id number]  --    Growth in aerobic bottle: Gram Negative Rods  Growth in anaerobic bottle: Gram Negative Rods  Culture - Blood (12.20.24 @ 05:39)   Gram Stain:   Growth in aerobic bottle: Gram Negative Rods   Growth in anaerobic bottle: Gram Negative Rods  - Amoxicillin/Clavulanic Acid: S <=8/4  - Ampicillin: S <=8 These ampicillin results predict results for amoxicillin  - Ampicillin/Sulbactam: S <=4/2  - Aztreonam: S <=4  - Cefazolin: R >16  - Cefepime: S <=2  - Cefotaxime: S <=2  - Cefoxitin: R >16  - Ceftazidime: S <=1  - Ceftriaxone: S <=1  - Cefuroxime: S <=4  - Ciprofloxacin: S <=0.25  - Gentamicin: S <=2  - Levofloxacin: S <=0.5  - Meropenem: S <=1  - Ertapenem: S <=0.5  - Piperacillin/Tazobactam: S <=8  - Tigecycline: S <=2  - Tobramycin: S <=2  - Trimethoprim/Sulfamethoxazole: S <=0.5/9.5  - Minocycline: S <=4  - Enterobacterales: Detec  Specimen Source: .Blood BLOOD  Organism: Blood Culture PCR  Organism: Gram Negative Rods  Culture Results:   Growth in aerobic and anaerobic bottles: Pantoea dispersa   Direct identification is available within approximately 3-5   hours either by Blood Panel Multiplexed PCR or Direct   MALDI-TOF. Details: [email or web address]  Organism Identification: Blood Culture PCR   Gram Negative Rods  Method Type: TAMMY  Method Type: PCR    .Blood BLOOD  12-20-24   Growth in aerobic and anaerobic bottles: Pantoea dispersa  Direct identification is available within approximately 3-5  hours either by Blood Panel Multiplexed PCR or Direct  MALDI-TOF. Details: [email or web address]  --  Blood Culture PCR      .Blood BLOOD  12-17-24   No growth at 5 days  --  --      .Blood BLOOD  12-17-24   No growth at 5 days  --  --          Rapid RVP Result: NotDetec (12-18 @ 10:09)        RADIOLOGY:  Images independently visualized and reviewed personally, findings as below  < from: Xray Chest 1 View- PORTABLE-Urgent (Xray Chest 1 View- PORTABLE-Urgent .) (12.20.24 @ 05:37) >    IMPRESSION: No focal abnormality to account for fever.    < end of copied text >  < from: CT Angio Chest PE Protocol w/ IV Cont (12.09.24 @ 03:02) >  IMPRESSION:  No pulmonary embolus.    No acute fracture.    < end of copied text >

## 2024-12-24 NOTE — CONSULT NOTE ADULT - ATTENDING COMMENTS
Patient with sickle cell, history of acute chest, here with chest pain, hypoxia, concern for acute chest syndrome vs acute sickle cell crisis of liver requiring exchange transfusion.    Patient with acute hypoxemic respiratory failure but only on 2 L and saturating > 95%. Not in distress.  Hemodynamically stable.  Agree with plan to get shiley catheter by vascular or IR and proceed with exchange transfusion.  Abx as per ID.  If escalating oxygen requirements, please call micu.
MATA   Hypervolemic  refrain from giving NSAIDs  DC IVF   avoid contrast studies, ACE-I, ARB, or NSAIDs   dose meds per eGFR
Line by HO

## 2024-12-24 NOTE — PROGRESS NOTE ADULT - PROBLEM SELECTOR PLAN 8
DVT prop: lovenox. Elevated risk of DVT given SCC. No signs of bleeding.   low Na diet    Heme notified about the need for Exchange transfusion 10:50AM DVT prop: lovenox. Elevated risk of DVT given SCC. No signs of bleeding.   low Na diet

## 2024-12-24 NOTE — CONSULT NOTE ADULT - ASSESSMENT
44M hx of sickle cell disease, gout here for fall initially but found to have sickle cell pain crisis requiring transfusions earlier in his course, now with sepsis related to enterobacter and pantoea bacteremia with worsening bilirubin concerning for vasoocclusive hepatopathy. MICU consulted given need for urgent access for exchange transfusion.

## 2024-12-25 LAB
ALBUMIN SERPL ELPH-MCNC: 3.2 G/DL — LOW (ref 3.3–5)
ALP SERPL-CCNC: 173 U/L — HIGH (ref 40–120)
ALT FLD-CCNC: 53 U/L — HIGH (ref 4–41)
ANION GAP SERPL CALC-SCNC: 12 MMOL/L — SIGNIFICANT CHANGE UP (ref 7–14)
APTT BLD: 29.3 SEC — SIGNIFICANT CHANGE UP (ref 24.5–35.6)
AST SERPL-CCNC: 109 U/L — HIGH (ref 4–40)
BASOPHILS # BLD AUTO: 0.07 K/UL — SIGNIFICANT CHANGE UP (ref 0–0.2)
BASOPHILS # BLD AUTO: 0.1 K/UL — SIGNIFICANT CHANGE UP (ref 0–0.2)
BASOPHILS NFR BLD AUTO: 0.5 % — SIGNIFICANT CHANGE UP (ref 0–2)
BASOPHILS NFR BLD AUTO: 0.6 % — SIGNIFICANT CHANGE UP (ref 0–2)
BILIRUB SERPL-MCNC: 11.8 MG/DL — HIGH (ref 0.2–1.2)
BUN SERPL-MCNC: 21 MG/DL — SIGNIFICANT CHANGE UP (ref 7–23)
CALCIUM SERPL-MCNC: 8.9 MG/DL — SIGNIFICANT CHANGE UP (ref 8.4–10.5)
CHLORIDE SERPL-SCNC: 100 MMOL/L — SIGNIFICANT CHANGE UP (ref 98–107)
CO2 SERPL-SCNC: 23 MMOL/L — SIGNIFICANT CHANGE UP (ref 22–31)
CREAT SERPL-MCNC: 0.93 MG/DL — SIGNIFICANT CHANGE UP (ref 0.5–1.3)
EGFR: 103 ML/MIN/1.73M2 — SIGNIFICANT CHANGE UP
EOSINOPHIL # BLD AUTO: 0.01 K/UL — SIGNIFICANT CHANGE UP (ref 0–0.5)
EOSINOPHIL # BLD AUTO: 0.07 K/UL — SIGNIFICANT CHANGE UP (ref 0–0.5)
EOSINOPHIL NFR BLD AUTO: 0.1 % — SIGNIFICANT CHANGE UP (ref 0–6)
EOSINOPHIL NFR BLD AUTO: 0.6 % — SIGNIFICANT CHANGE UP (ref 0–6)
GLUCOSE SERPL-MCNC: 111 MG/DL — HIGH (ref 70–99)
GRAM STN FLD: ABNORMAL
HAPTOGLOB SERPL-MCNC: <20 MG/DL — LOW (ref 34–200)
HCT VFR BLD CALC: 14.9 % — CRITICAL LOW (ref 39–50)
HCT VFR BLD CALC: 26.3 % — LOW (ref 39–50)
HGB BLD-MCNC: 5.4 G/DL — CRITICAL LOW (ref 13–17)
HGB BLD-MCNC: 8.7 G/DL — LOW (ref 13–17)
IANC: 14.5 K/UL — HIGH (ref 1.8–7.4)
IANC: 7.89 K/UL — HIGH (ref 1.8–7.4)
IMM GRANULOCYTES NFR BLD AUTO: 0.7 % — SIGNIFICANT CHANGE UP (ref 0–0.9)
IMM GRANULOCYTES NFR BLD AUTO: 1.1 % — HIGH (ref 0–0.9)
INR BLD: 1.15 RATIO — SIGNIFICANT CHANGE UP (ref 0.85–1.16)
LDH SERPL L TO P-CCNC: 545 U/L — HIGH (ref 135–225)
LYMPHOCYTES # BLD AUTO: 1.91 K/UL — SIGNIFICANT CHANGE UP (ref 1–3.3)
LYMPHOCYTES # BLD AUTO: 10.8 % — LOW (ref 13–44)
LYMPHOCYTES # BLD AUTO: 16.9 % — SIGNIFICANT CHANGE UP (ref 13–44)
LYMPHOCYTES # BLD AUTO: 2.02 K/UL — SIGNIFICANT CHANGE UP (ref 1–3.3)
MAGNESIUM SERPL-MCNC: 1.8 MG/DL — SIGNIFICANT CHANGE UP (ref 1.6–2.6)
MCHC RBC-ENTMCNC: 28.4 PG — SIGNIFICANT CHANGE UP (ref 27–34)
MCHC RBC-ENTMCNC: 29.2 PG — SIGNIFICANT CHANGE UP (ref 27–34)
MCHC RBC-ENTMCNC: 33.1 G/DL — SIGNIFICANT CHANGE UP (ref 32–36)
MCHC RBC-ENTMCNC: 36.2 G/DL — HIGH (ref 32–36)
MCV RBC AUTO: 80.5 FL — SIGNIFICANT CHANGE UP (ref 80–100)
MCV RBC AUTO: 85.9 FL — SIGNIFICANT CHANGE UP (ref 80–100)
MONOCYTES # BLD AUTO: 1.29 K/UL — HIGH (ref 0–0.9)
MONOCYTES # BLD AUTO: 1.87 K/UL — HIGH (ref 0–0.9)
MONOCYTES NFR BLD AUTO: 10 % — SIGNIFICANT CHANGE UP (ref 2–14)
MONOCYTES NFR BLD AUTO: 11.4 % — SIGNIFICANT CHANGE UP (ref 2–14)
MRSA PCR RESULT.: SIGNIFICANT CHANGE UP
NEUTROPHILS # BLD AUTO: 14.5 K/UL — HIGH (ref 1.8–7.4)
NEUTROPHILS # BLD AUTO: 7.89 K/UL — HIGH (ref 1.8–7.4)
NEUTROPHILS NFR BLD AUTO: 69.8 % — SIGNIFICANT CHANGE UP (ref 43–77)
NEUTROPHILS NFR BLD AUTO: 77.5 % — HIGH (ref 43–77)
NRBC # BLD: 0 /100 WBCS — SIGNIFICANT CHANGE UP (ref 0–0)
NRBC # BLD: 0 /100 WBCS — SIGNIFICANT CHANGE UP (ref 0–0)
NRBC # FLD: 0.02 K/UL — HIGH (ref 0–0)
NRBC # FLD: 0.03 K/UL — HIGH (ref 0–0)
PHOSPHATE SERPL-MCNC: 2.1 MG/DL — LOW (ref 2.5–4.5)
PLATELET # BLD AUTO: 192 K/UL — SIGNIFICANT CHANGE UP (ref 150–400)
PLATELET # BLD AUTO: 328 K/UL — SIGNIFICANT CHANGE UP (ref 150–400)
POTASSIUM SERPL-MCNC: 4.5 MMOL/L — SIGNIFICANT CHANGE UP (ref 3.5–5.3)
POTASSIUM SERPL-SCNC: 4.5 MMOL/L — SIGNIFICANT CHANGE UP (ref 3.5–5.3)
PROT SERPL-MCNC: 6.2 G/DL — SIGNIFICANT CHANGE UP (ref 6–8.3)
PROTHROM AB SERPL-ACNC: 13.3 SEC — SIGNIFICANT CHANGE UP (ref 9.9–13.4)
RBC # BLD: 1.85 M/UL — LOW (ref 4.2–5.8)
RBC # BLD: 1.85 M/UL — LOW (ref 4.2–5.8)
RBC # BLD: 3.06 M/UL — LOW (ref 4.2–5.8)
RBC # FLD: 15.7 % — HIGH (ref 10.3–14.5)
RBC # FLD: 19.1 % — HIGH (ref 10.3–14.5)
RETICS #: 199.4 K/UL — HIGH (ref 25–125)
RETICS/RBC NFR: 10.8 % — HIGH (ref 0.5–2.5)
S AUREUS DNA NOSE QL NAA+PROBE: DETECTED
SODIUM SERPL-SCNC: 135 MMOL/L — SIGNIFICANT CHANGE UP (ref 135–145)
WBC # BLD: 11.31 K/UL — HIGH (ref 3.8–10.5)
WBC # BLD: 18.7 K/UL — HIGH (ref 3.8–10.5)
WBC # FLD AUTO: 11.31 K/UL — HIGH (ref 3.8–10.5)
WBC # FLD AUTO: 18.7 K/UL — HIGH (ref 3.8–10.5)

## 2024-12-25 PROCEDURE — 99233 SBSQ HOSP IP/OBS HIGH 50: CPT

## 2024-12-25 RX ORDER — DIPHENHYDRAMINE HCL 25 MG
25 TABLET ORAL ONCE
Refills: 0 | Status: COMPLETED | OUTPATIENT
Start: 2024-12-25 | End: 2024-12-25

## 2024-12-25 RX ORDER — ALTEPLASE 100 MG
2 KIT INTRAVENOUS ONCE
Refills: 0 | Status: COMPLETED | OUTPATIENT
Start: 2024-12-25 | End: 2024-12-25

## 2024-12-25 RX ORDER — ACETAMINOPHEN 80 MG/.8ML
650 SOLUTION/ DROPS ORAL EVERY 6 HOURS
Refills: 0 | Status: COMPLETED | OUTPATIENT
Start: 2024-12-25 | End: 2024-12-25

## 2024-12-25 RX ORDER — MUPIROCIN 2 %
1 OINTMENT (GRAM) TOPICAL
Refills: 0 | Status: COMPLETED | OUTPATIENT
Start: 2024-12-25 | End: 2024-12-30

## 2024-12-25 RX ADMIN — Medication 17 GRAM(S): at 18:07

## 2024-12-25 RX ADMIN — Medication 25 MILLIGRAM(S): at 14:09

## 2024-12-25 RX ADMIN — Medication 1 MILLIGRAM(S): at 12:02

## 2024-12-25 RX ADMIN — URSODIOL 500 MILLIGRAM(S): 250 TABLET, FILM COATED ORAL at 05:20

## 2024-12-25 RX ADMIN — URSODIOL 500 MILLIGRAM(S): 250 TABLET, FILM COATED ORAL at 18:06

## 2024-12-25 RX ADMIN — NIFEDIPINE 30 MILLIGRAM(S): 60 TABLET, EXTENDED RELEASE ORAL at 05:20

## 2024-12-25 RX ADMIN — ENOXAPARIN SODIUM 40 MILLIGRAM(S): 60 INJECTION INTRAVENOUS; SUBCUTANEOUS at 18:07

## 2024-12-25 RX ADMIN — ALTEPLASE 2 MILLIGRAM(S): KIT at 22:30

## 2024-12-25 RX ADMIN — Medication 17 GRAM(S): at 05:20

## 2024-12-25 RX ADMIN — ALLOPURINOL 50 MILLIGRAM(S): 100 TABLET ORAL at 12:02

## 2024-12-25 RX ADMIN — SENNOSIDES 2 TABLET(S): 8.6 TABLET, FILM COATED ORAL at 21:18

## 2024-12-25 RX ADMIN — CHLORHEXIDINE GLUCONATE 1 APPLICATION(S): 1.2 RINSE ORAL at 12:02

## 2024-12-25 RX ADMIN — Medication 30 MILLILITER(S): at 19:24

## 2024-12-25 RX ADMIN — ACETAMINOPHEN 650 MILLIGRAM(S): 80 SOLUTION/ DROPS ORAL at 14:09

## 2024-12-25 RX ADMIN — Medication 30 MILLILITER(S): at 07:20

## 2024-12-25 RX ADMIN — ERTAPENEM SODIUM 120 MILLIGRAM(S): 1 INJECTION, POWDER, LYOPHILIZED, FOR SOLUTION INTRAMUSCULAR; INTRAVENOUS at 21:18

## 2024-12-25 RX ADMIN — PANTOPRAZOLE 40 MILLIGRAM(S): 40 TABLET, DELAYED RELEASE ORAL at 05:20

## 2024-12-25 NOTE — PROGRESS NOTE ADULT - ASSESSMENT
44M with history of sickle cell disease with episodes of sickle cell crisis/acute chest syndrome, cirrhosis due to iron overload admitted for SOB and fall. Course complicated by bacteremia and sepsis, MATA. Continues with hyperbilirubinemia (t bili 13.2 12/24), possibly in part due to vasoocclusive crisis. Vascular surgery consulted for Shiley placement for exchange transfusion. Now s/p R Fem shiley placement.     Recommendation:   Shiley to be replaced by IR after 48 hrs (new shiley placement) if exchange transfusion is till required  Rest of care per primary       C Team   [id number]

## 2024-12-25 NOTE — CHART NOTE - NSCHARTNOTEFT_GEN_A_CORE
RENNY called the office of [facility] and received a call back from a Dr. Greenwood outpt hematologist that follows the pt. Dr. Greenwood was informed that the pt's hemoglobin dropped to 5.4 and if transfusion of 1U of PRRBC was needed, pt already scheduled for a exchange transfusion this afternoon and Dr. Greenwood was asked if the pt needed a additional 1U of PRBCs. Dr. Greenwood stated that an additional unit of pRBCs is not needed if the pt already received a exchange transfusion, except if the pt is experiencing symptomatic anemia. Pt VSS and has no complaints of fatigue, SOB, CP or dizziness. Attending made aware and approves plan. Will Continue to monitor pt.     Marcel barragan ACP

## 2024-12-25 NOTE — PROGRESS NOTE ADULT - SUBJECTIVE AND OBJECTIVE BOX
INTERVAL HPI/OVERNIGHT EVENTS:  Patient S&E at bedside. Had central line placed in preparation for rbc exchange - not yet started. Reports pain with insertion of line, otherwise no new symptoms.     VITAL SIGNS:  T(F): 99 (12-25-24 @ 11:45)  HR: 100 (12-25-24 @ 11:45)  BP: 116/86 (12-25-24 @ 11:45)  RR: 18 (12-25-24 @ 11:45)  SpO2: 95% (12-25-24 @ 11:45)  Wt(kg): --    PHYSICAL EXAM:    Constitutional: NAD  Eyes: EOMI, sclera non-icteric  Neck: supple, no masses  Respiratory: symmetric chest expansion   Cardiovascular: RRR, no M/R/G  Gastrointestinal: soft, NTND, no masses palpable  Extremities: no c/c/e  Neurological: AAOx3      MEDICATIONS  (STANDING):  allopurinol 50 milliGRAM(s) Oral daily  alteplase for catheter clearance 2 milliGRAM(s) Catheter once  calcium gluconate IVPB 2 Gram(s) IV Intermittent once  chlorhexidine 2% Cloths 1 Application(s) Topical daily  enoxaparin Injectable 40 milliGRAM(s) SubCutaneous every 24 hours  ertapenem  IVPB 1000 milliGRAM(s) IV Intermittent every 24 hours  folic acid 1 milliGRAM(s) Oral every 24 hours  HYDROmorphone PCA (5 mG/mL) 30 milliLiter(s) PCA Continuous PCA Continuous  influenza   Vaccine 0.5 milliLiter(s) IntraMuscular once  NIFEdipine XL 30 milliGRAM(s) Oral daily  pantoprazole    Tablet 40 milliGRAM(s) Oral before breakfast  polyethylene glycol 3350 17 Gram(s) Oral two times a day  senna 2 Tablet(s) Oral at bedtime  sodium chloride 0.45%. 1000 milliLiter(s) (30 mL/Hr) IV Continuous <Continuous>  ursodiol Tablet 500 milliGRAM(s) Oral two times a day    MEDICATIONS  (PRN):  naloxone Injectable 0.1 milliGRAM(s) IV Push once PRN Opioid Overdose  ondansetron Injectable 4 milliGRAM(s) IV Push every 8 hours PRN Nausea and/or Vomiting      Allergies    penicillin (Pruritus)  hydroxyurea (Other)  ceftriaxone (Anaphylaxis)  piperacillin-tazobactam (Urticaria)    Intolerances        LABS:                        5.4    18.70 )-----------( 328      ( 25 Dec 2024 11:58 )             14.9     12-25    135  |  100  |  21  ----------------------------<  111[H]  4.5   |  23  |  0.93    Ca    8.9      25 Dec 2024 11:58  Phos  2.1     12-25  Mg     1.80     12-25    TPro  6.2  /  Alb  3.2[L]  /  TBili  11.8[H]  /  DBili  x   /  AST  109[H]  /  ALT  53[H]  /  AlkPhos  173[H]  12-25    PT/INR - ( 25 Dec 2024 11:58 )   PT: 13.3 sec;   INR: 1.15 ratio         PTT - ( 25 Dec 2024 11:58 )  PTT:29.3 sec  Urinalysis Basic - ( 25 Dec 2024 11:58 )    Color: x / Appearance: x / SG: x / pH: x  Gluc: 111 mg/dL / Ketone: x  / Bili: x / Urobili: x   Blood: x / Protein: x / Nitrite: x   Leuk Esterase: x / RBC: x / WBC x   Sq Epi: x / Non Sq Epi: x / Bacteria: x        RADIOLOGY & ADDITIONAL TESTS:  Studies reviewed.

## 2024-12-25 NOTE — PROVIDER CONTACT NOTE (CRITICAL VALUE NOTIFICATION) - BACKGROUND
Patient has a history of sickle cell and was admitted with sickle cell crisis
Patient has sickle cell, Hgb and Hct have been trending low
Pt came in with sickle cell crisis. S/p PRBC infusion. Pt known to be anemic, pt reports baseline hgb is 5.5-4.5, team set .goal Hgb to be >5.5.
Patient admitted for sickle cell disease with crisis, patient's morning lab results came back, hgb was 6.5 and hct was 18.4.
Pt came in with sickle cell crisis. S/p PRBC infusion. Pt known to be anemic, pt reports baseline hgb is 5.5-4.5, team set .goal Hgb to be >5.5.
pt presented with SOB worsening following a fall. pmh sickle cell
Pt came in with sickle cell crisis. S/p PRBC infusion. Pt known to be anemic, pt reports baseline hgb is 5.5-4.5, team set .goal Hgb to be >5.5.
44yr male  with past medical history of sickle cell, prior CTAs for acute Pt denies chest pain. Admitted for Hemoglobin SS disease with crisis
pt presented with SOB worsening following a fall. pmh sickle cell
44yr male  with past medical history of sickle cell, prior CTAs for acute Pt denies chest pain. Admitted for Hemoglobin SS disease with crisis
scc
43 y/o M admitted for hemoglobin SS disease with crisis
Patient admitted for sickle cell crisis with history of anemia and blood transfusions
pt presented with SOB worsening following a fall. pmh sickle cell
Pt admitted for Hemoglobin SC with crisis.
Pt came in with sickle cell crisis. S/p PRBC infusion. Pt known to be anemic, pt reports baseline hgb is 5.5-4.5, team set .goal Hgb to be >5.5.
Pt admitted for sickle cell crisis pending red blood cell exchange

## 2024-12-25 NOTE — PROVIDER CONTACT NOTE (CRITICAL VALUE NOTIFICATION) - ASSESSMENT
Blood Culture Growth in Aerobic Bottle: Gram (-) rods
Pt A&Ox4, no s/s of distress noted. denies chest pain and SOB.
call received from lab hgb 5.7,  hct 16.4
Pt comfortable in bed. PCA pump maintained, pt reports pain well controlled. No s/s of bleeding at this time. Pt denies chest pain, lightheadedness, dizziness.
aox-4, vss
Pt comfortable in bed. PCA pump maintained, pt reports pain well controlled. No s/s of bleeding at this time. Pt denies chest pain, lightheadedness, dizziness.
Blood Culture Growth in Aerobic Bottle: Gram (-) rods,  Growth in anerobic bottle: Gram (-) rods
Pt A&Ox4, afebrile. VS stable, not in distress. Denies chest pain and SOB.
Hgb 5.4, HCT 14.9
call received from lab hgb 6.0 hct 16.8
hemoglobin 5.8, hematocrit 16.6
NO s/s of acute distress noted. Pt resting in bed
Patient is A&Ox4, no s/s distress or discomfort. VS as documented. Patient resting in bed.
Patient is AxO x4 with stable vitals. Patient's morning lab results came back, hgb was 6.5 and hct was18.4.
NO s/s of acute distress noted. Pt in room talking on phone.
patient with critical value: blood culture from 12/22/24, growth in anaerobic bottle is gram negative rods

## 2024-12-25 NOTE — CHART NOTE - NSCHARTNOTEFT_GEN_A_CORE
Patient is a 44 year old male with PMHx sickle cell disease, prior acute chest, coming in with dyspnea and acute pain, admitted for sickle cell pain crisis.    RBC exchange performed on 12/25 by GLEN with 6u pRBCs. Patient tolerated procedure well without adverse events.   Trend CBC and HgbS post procedure.

## 2024-12-25 NOTE — PROGRESS NOTE ADULT - ASSESSMENT
44 year old male with history of sickle cell anemia presenting today s/p mechanical fall    Sickle cell Disease - Pain Crisis   - Follows with Dr Jordan of [facility]  - s/p 1u PRBCs 12/18/24   - Chronic hemolysis, iron overload; trying to limit transfusions - maintain Hgb around 5.5  - Continue Jadenu  - Maximize pain control; currently on Dilaudid PCA pump    Gram negative bacteremia  - Source unclear but noted to have rising DIRECT bilirubin   - Repeat cultures negative, remains on antibiotics, ID following    Rising D-bili   - s/p MRCP which showed no obstruction, but underlying iron overload  - Recommended exchange transfusion for 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; goal Hb S < 30%  - Hepatology consulted, appreciate recs  - Please check Hb S levels at baseline and after exchange     Hypoxia   - Mild and intermittent; previously walked and had no desaturation on ambulation and there is no evidence of infiltrate on chest x-rays or CT and no chest pain, most recent CXR without evidence of PNA but likely atelectasis - had not been using incentive spirometer - advised to use throughout the day; denies any chest pain     Acacia Clark MD  Hematology/Oncology  [facility]  [phone] (Office)  [phone] (Alt office)  Evenings and weekends please call MD on call or office

## 2024-12-25 NOTE — CHART NOTE - NSCHARTNOTEFT_GEN_A_CORE
Post Operative Note  Patient: TOLU VARGAS 44y (1980) Male     Subjective: Patient seen and examined post operatively. The patient is resting in bed comfortably, pain is well controlled. Denies fever, chills, nausea, vomiting, diarrhea, chest pain, and SOB.   Objective:  Vitals: T(F): 98.6 (12-25 @ 03:45), Max: 99.5 (12-24 @ 19:45)  HR: 88 (12-25 @ 03:45)  BP: 111/68 (12-25 @ 03:45) (110/66 - 139/77)  ABP: --  RR: 17 (12-25 @ 03:45)  SpO2: 100% (12-25 @ 03:45)        Physical Examination:  General: NAD, resting comfortably in bed.  Respiratory: Equal bilateral chest expansion, no increased WOB.  Cardiovascular: RRR/WWP.   Abdomen: soft, nontender, nondistended, shiley in right groin w/o swelling, bleeding, or drainage    Assessment:   ALICIA is a 44y year old man with history of sickle cell disease with episodes of sickle cell crisis/acute chest syndrome, cirrhosis due to iron overload admitted for SOB and fall. Course complicated by bacteremia and sepsis, MATA. Continues with hyperbilirubinemia (t bili 13.2 12/24), possibly in part due to vasoocclusive crisis. Vascular surgery consulted for Shiley placement for exchange transfusion. Patient is now s/p Shiley placement.     Plan:  - DVT: enoxaparin  - Antimicrobials: ertapenem   - Diet: Regular  - IVF: sodium chloride 0.45%. (30 mL/Hr)  - Pain control, PCA  - rest of care per primary team    C Team Surgery  [id number]

## 2024-12-25 NOTE — PROGRESS NOTE ADULT - PROBLEM SELECTOR PLAN 1
Fever to 101.2 at 21:35 on 12/17/24. Reported to have O2 sat in 80s With leukocytosis. focal infectious symptoms. Recent staph epi bacteremia during November admission here, seen by ID at that time. Patient has port in place,   - blood culture with Enterobacterales and pantoea, ID reconsulted, rec to switch meropenem to ertapenem 1 gm q24h (12/24 - ), f/u blood cultures from 12/22 showing GNR  - ?Pantoea from the rolling paper/marijuana he uses could be a source.

## 2024-12-25 NOTE — PROGRESS NOTE ADULT - PROBLEM SELECTOR PLAN 2
Pt spiking intermittent fever, new CXR showed opacities ( neg CXR x 3 in the past few days), high suspicion for acute chest,   Patient reports hgb baseline is ~ 5.5  -Discussed with Heme, plan for exhange transfusion today given high suspicion for acute chest syndrome and liver involvement; s/p R femoral shiley placement on 12/24; Shiley to be replaced by IR after 48 hrs if exchange transfusion is still required  - C/w dilaudid PCA, monitor pain, wean as able  - Bowel regimen with Senna daily, miralax prn  - Patient follows Dr Jordan of [facility] - appreciate hem/onc input, s/p 2 unit pRBC this admission. Trend Hgb.   - Pt on Jadenu at home, discussed with Heme onc, hold at this time, plan to resume at time of discharge.  - Discussed with pt's outpatient hematologist today on 12/25 and informed him that pt's Hgb is 5.4; he said no need for additional pRBC with exchange transfusion today unless pt has symptomatic anemia

## 2024-12-25 NOTE — PROGRESS NOTE ADULT - SUBJECTIVE AND OBJECTIVE BOX
Vascular SURGERY DAILY PROGRESS NOTE    SUBJECTIVE: Patient seen and evaluated on AM rounds.     Overnight Events:  No acute events overnight  -----------------------------------------------------------------------------------------------------------------------------------------------------------------------------------------------------------  OBJECTIVE:  Vital Signs Last 24 Hrs  T(C): 37.2 (25 Dec 2024 07:45), Max: 37.5 (24 Dec 2024 19:45)  T(F): 98.9 (25 Dec 2024 07:45), Max: 99.5 (24 Dec 2024 19:45)  HR: 95 (25 Dec 2024 07:45) (88 - 122)  BP: 110/65 (25 Dec 2024 07:45) (105/62 - 130/70)  BP(mean): --  RR: 18 (25 Dec 2024 07:45) (17 - 18)  SpO2: 95% (25 Dec 2024 07:45) (95% - 100%)    Parameters below as of 25 Dec 2024 07:45  Patient On (Oxygen Delivery Method): nasal cannula  O2 Flow (L/min): 2    I&O's Detail    24 Dec 2024 07:01  -  25 Dec 2024 07:00  --------------------------------------------------------  IN:    sodium chloride 0.45%: 300 mL  Total IN: 300 mL    OUT:    Voided (mL): 700 mL  Total OUT: 700 mL    Total NET: -400 mL        Daily     Daily Weight in k.3 (25 Dec 2024 05:15)  MEDICATIONS  (STANDING):  allopurinol 50 milliGRAM(s) Oral daily  alteplase for catheter clearance 2 milliGRAM(s) Catheter once  calcium gluconate IVPB 2 Gram(s) IV Intermittent once  chlorhexidine 2% Cloths 1 Application(s) Topical daily  enoxaparin Injectable 40 milliGRAM(s) SubCutaneous every 24 hours  ertapenem  IVPB 1000 milliGRAM(s) IV Intermittent every 24 hours  folic acid 1 milliGRAM(s) Oral every 24 hours  HYDROmorphone PCA (5 mG/mL) 30 milliLiter(s) PCA Continuous PCA Continuous  influenza   Vaccine 0.5 milliLiter(s) IntraMuscular once  NIFEdipine XL 30 milliGRAM(s) Oral daily  pantoprazole    Tablet 40 milliGRAM(s) Oral before breakfast  polyethylene glycol 3350 17 Gram(s) Oral two times a day  senna 2 Tablet(s) Oral at bedtime  sodium chloride 0.45%. 1000 milliLiter(s) (30 mL/Hr) IV Continuous <Continuous>  ursodiol Tablet 500 milliGRAM(s) Oral two times a day    MEDICATIONS  (PRN):  naloxone Injectable 0.1 milliGRAM(s) IV Push once PRN Opioid Overdose  ondansetron Injectable 4 milliGRAM(s) IV Push every 8 hours PRN Nausea and/or Vomiting      LABS:                        5.8    13.07 )-----------( 302      ( 24 Dec 2024 05:40 )             16.0     12-24    135  |  98  |  27[H]  ----------------------------<  105[H]  4.3   |  24  |  0.98    Ca    8.9      24 Dec 2024 05:40  Phos  3.1     12-24  Mg     2.00     12-24    TPro  6.4  /  Alb  3.2[L]  /  TBili  13.2[H]  /  DBili  x   /  AST  124[H]  /  ALT  61[H]  /  AlkPhos  160[H]  12-24      Urinalysis Basic - ( 24 Dec 2024 05:40 )    Color: x / Appearance: x / SG: x / pH: x  Gluc: 105 mg/dL / Ketone: x  / Bili: x / Urobili: x   Blood: x / Protein: x / Nitrite: x   Leuk Esterase: x / RBC: x / WBC x   Sq Epi: x / Non Sq Epi: x / Bacteria: x          PHYSICAL EXAM:  Physical Exam:  General: NAD, male appearing stated age. Pacing near bed  Resp: Nonlabored breathing on NC  CV: Hemodynamically stable  Extremities: Grossly symmetric, R shiley in place

## 2024-12-25 NOTE — PROGRESS NOTE ADULT - SUBJECTIVE AND OBJECTIVE BOX
Division of Hospital Medicine  Ana Flowers MD  Available via MS Teams    SUBJECTIVE / OVERNIGHT EVENTS: No active issues overnight; pt c/o persistent pain    MEDICATIONS  (STANDING):  allopurinol 50 milliGRAM(s) Oral daily  alteplase for catheter clearance 2 milliGRAM(s) Catheter once  calcium gluconate IVPB 2 Gram(s) IV Intermittent once  chlorhexidine 2% Cloths 1 Application(s) Topical daily  enoxaparin Injectable 40 milliGRAM(s) SubCutaneous every 24 hours  ertapenem  IVPB 1000 milliGRAM(s) IV Intermittent every 24 hours  folic acid 1 milliGRAM(s) Oral every 24 hours  HYDROmorphone PCA (5 mG/mL) 30 milliLiter(s) PCA Continuous PCA Continuous  influenza   Vaccine 0.5 milliLiter(s) IntraMuscular once  NIFEdipine XL 30 milliGRAM(s) Oral daily  pantoprazole    Tablet 40 milliGRAM(s) Oral before breakfast  polyethylene glycol 3350 17 Gram(s) Oral two times a day  senna 2 Tablet(s) Oral at bedtime  sodium chloride 0.45%. 1000 milliLiter(s) (30 mL/Hr) IV Continuous <Continuous>  ursodiol Tablet 500 milliGRAM(s) Oral two times a day    MEDICATIONS  (PRN):  naloxone Injectable 0.1 milliGRAM(s) IV Push once PRN Opioid Overdose  ondansetron Injectable 4 milliGRAM(s) IV Push every 8 hours PRN Nausea and/or Vomiting      I&O's Summary    24 Dec 2024 07:01  -  25 Dec 2024 07:00  --------------------------------------------------------  IN: 300 mL / OUT: 700 mL / NET: -400 mL        PHYSICAL EXAM:  Vital Signs Last 24 Hrs  T(C): 37.2 (25 Dec 2024 11:45), Max: 37.5 (24 Dec 2024 19:45)  T(F): 99 (25 Dec 2024 11:45), Max: 99.5 (24 Dec 2024 19:45)  HR: 100 (25 Dec 2024 11:45) (88 - 122)  BP: 116/86 (25 Dec 2024 11:45) (105/62 - 130/70)  BP(mean): --  RR: 18 (25 Dec 2024 11:45) (17 - 18)  SpO2: 95% (25 Dec 2024 11:45) (95% - 100%)    Parameters below as of 25 Dec 2024 11:45  Patient On (Oxygen Delivery Method): nasal cannula  O2 Flow (L/min): 2    CONSTITUTIONAL: NAD  EYES: PERRLA; conjunctiva and sclera clear  ENMT: Moist oral mucosa, no pharyngeal injection or exudates; normal dentition  NECK: Supple, no palpable masses; no thyromegaly  RESPIRATORY: Normal respiratory effort; lungs are clear to auscultation bilaterally; no rales, rhonchi, or wheezing  CARDIOVASCULAR: Regular rate and rhythm, normal S1 and S2, no murmur/rub/gallop; Peripheral pulses are 2+ bilaterally  ABDOMEN: Nontender to palpation, normoactive bowel sounds, no rebound/guarding; No hepatosplenomegaly  MUSCULOSKELETAL: no clubbing or cyanosis of digits; b/l LE edema+  NEUROLOGY: Awake and alert to person, place, and date; no focal neurological deficits   SKIN: No rashes; no palpable lesions    LABS:                        5.4    18.70 )-----------( 328      ( 25 Dec 2024 11:58 )             14.9     12-25    135  |  100  |  21  ----------------------------<  111[H]  4.5   |  23  |  0.93    Ca    8.9      25 Dec 2024 11:58  Phos  2.1     12-25  Mg     1.80     12-25    TPro  6.2  /  Alb  3.2[L]  /  TBili  11.8[H]  /  DBili  x   /  AST  109[H]  /  ALT  53[H]  /  AlkPhos  173[H]  12-25    PT/INR - ( 25 Dec 2024 11:58 )   PT: 13.3 sec;   INR: 1.15 ratio         PTT - ( 25 Dec 2024 11:58 )  PTT:29.3 sec      Urinalysis Basic - ( 25 Dec 2024 11:58 )    Color: x / Appearance: x / SG: x / pH: x  Gluc: 111 mg/dL / Ketone: x  / Bili: x / Urobili: x   Blood: x / Protein: x / Nitrite: x   Leuk Esterase: x / RBC: x / WBC x   Sq Epi: x / Non Sq Epi: x / Bacteria: x        SARS-CoV-2: NotDetec (18 Dec 2024 10:09)      Imaging and consultant notes reviewed.

## 2024-12-25 NOTE — PROVIDER CONTACT NOTE (CRITICAL VALUE NOTIFICATION) - ACTION/TREATMENT ORDERED:
RN will monitor patient for s/s of bleeding, safety maintained
ACP Madisyn Angelaeral notified, care is ongoing
RENNY Barry notified
provider informed
1 unit RBC to be ordered to transfuse Pt
ACP Merlyn Bhagat notified. Care continues
ACP notified.
Provider notified
Provider notified - 1unit of PRBC ordered
ACP advised. No new orders at this time
Provider aware, plan is to transfuse if hemoglobin drops below 5.5
Provider notified, no new orders at this time
provider notified
ACP Merlyn Bhagat notified. Care continues
ACP Merlyn Bhagat aware. Patient is on IV abx meropenem. Care continues
No orders at this time
ACP Isaak Grover notified of critical result. Care continues.

## 2024-12-26 LAB
ALBUMIN SERPL ELPH-MCNC: 2.9 G/DL — LOW (ref 3.3–5)
ALP SERPL-CCNC: 146 U/L — HIGH (ref 40–120)
ALT FLD-CCNC: 54 U/L — HIGH (ref 4–41)
ANION GAP SERPL CALC-SCNC: 11 MMOL/L — SIGNIFICANT CHANGE UP (ref 7–14)
AST SERPL-CCNC: 111 U/L — HIGH (ref 4–40)
BASOPHILS # BLD AUTO: 0.17 K/UL — SIGNIFICANT CHANGE UP (ref 0–0.2)
BASOPHILS NFR BLD AUTO: 1.4 % — SIGNIFICANT CHANGE UP (ref 0–2)
BILIRUB SERPL-MCNC: 9.8 MG/DL — HIGH (ref 0.2–1.2)
BUN SERPL-MCNC: 24 MG/DL — HIGH (ref 7–23)
CALCIUM SERPL-MCNC: 9 MG/DL — SIGNIFICANT CHANGE UP (ref 8.4–10.5)
CHLORIDE SERPL-SCNC: 99 MMOL/L — SIGNIFICANT CHANGE UP (ref 98–107)
CO2 SERPL-SCNC: 23 MMOL/L — SIGNIFICANT CHANGE UP (ref 22–31)
CREAT SERPL-MCNC: 0.98 MG/DL — SIGNIFICANT CHANGE UP (ref 0.5–1.3)
CULTURE RESULTS: ABNORMAL
EGFR: 97 ML/MIN/1.73M2 — SIGNIFICANT CHANGE UP
EOSINOPHIL # BLD AUTO: 0.09 K/UL — SIGNIFICANT CHANGE UP (ref 0–0.5)
EOSINOPHIL NFR BLD AUTO: 0.7 % — SIGNIFICANT CHANGE UP (ref 0–6)
GLUCOSE SERPL-MCNC: 85 MG/DL — SIGNIFICANT CHANGE UP (ref 70–99)
HAPTOGLOB SERPL-MCNC: <20 MG/DL — LOW (ref 34–200)
HCT VFR BLD CALC: 25.7 % — LOW (ref 39–50)
HEMOGLOBIN INTERPRETATION: SIGNIFICANT CHANGE UP
HGB A MFR BLD: 88.7 % — LOW (ref 95–97.6)
HGB A2 MFR BLD: 2.5 % — SIGNIFICANT CHANGE UP (ref 2.4–3.5)
HGB BLD-MCNC: 9.2 G/DL — LOW (ref 13–17)
HGB F MFR BLD: 1.1 % — SIGNIFICANT CHANGE UP (ref 0–1.5)
HGB S MFR BLD: 7.7 % — HIGH
IANC: 6.96 K/UL — SIGNIFICANT CHANGE UP (ref 1.8–7.4)
IMM GRANULOCYTES NFR BLD AUTO: 0.9 % — SIGNIFICANT CHANGE UP (ref 0–0.9)
LDH SERPL L TO P-CCNC: 486 U/L — HIGH (ref 135–225)
LYMPHOCYTES # BLD AUTO: 3.74 K/UL — HIGH (ref 1–3.3)
LYMPHOCYTES # BLD AUTO: 30.2 % — SIGNIFICANT CHANGE UP (ref 13–44)
MAGNESIUM SERPL-MCNC: 1.8 MG/DL — SIGNIFICANT CHANGE UP (ref 1.6–2.6)
MCHC RBC-ENTMCNC: 29.5 PG — SIGNIFICANT CHANGE UP (ref 27–34)
MCHC RBC-ENTMCNC: 35.8 G/DL — SIGNIFICANT CHANGE UP (ref 32–36)
MCV RBC AUTO: 82.4 FL — SIGNIFICANT CHANGE UP (ref 80–100)
MONOCYTES # BLD AUTO: 1.33 K/UL — HIGH (ref 0–0.9)
MONOCYTES NFR BLD AUTO: 10.7 % — SIGNIFICANT CHANGE UP (ref 2–14)
NEUTROPHILS # BLD AUTO: 6.96 K/UL — SIGNIFICANT CHANGE UP (ref 1.8–7.4)
NEUTROPHILS NFR BLD AUTO: 56.1 % — SIGNIFICANT CHANGE UP (ref 43–77)
NRBC # BLD: 0 /100 WBCS — SIGNIFICANT CHANGE UP (ref 0–0)
NRBC # FLD: 0.03 K/UL — HIGH (ref 0–0)
PHOSPHATE SERPL-MCNC: 3 MG/DL — SIGNIFICANT CHANGE UP (ref 2.5–4.5)
PLATELET # BLD AUTO: 249 K/UL — SIGNIFICANT CHANGE UP (ref 150–400)
POTASSIUM SERPL-MCNC: 4.4 MMOL/L — SIGNIFICANT CHANGE UP (ref 3.5–5.3)
POTASSIUM SERPL-SCNC: 4.4 MMOL/L — SIGNIFICANT CHANGE UP (ref 3.5–5.3)
PROT SERPL-MCNC: 6 G/DL — SIGNIFICANT CHANGE UP (ref 6–8.3)
RBC # BLD: 3.12 M/UL — LOW (ref 4.2–5.8)
RBC # BLD: 3.12 M/UL — LOW (ref 4.2–5.8)
RBC # FLD: 16.1 % — HIGH (ref 10.3–14.5)
RETICS #: 173.2 K/UL — HIGH (ref 25–125)
RETICS/RBC NFR: 5.6 % — HIGH (ref 0.5–2.5)
SODIUM SERPL-SCNC: 133 MMOL/L — LOW (ref 135–145)
SPECIMEN SOURCE: SIGNIFICANT CHANGE UP
WBC # BLD: 12.4 K/UL — HIGH (ref 3.8–10.5)
WBC # FLD AUTO: 12.4 K/UL — HIGH (ref 3.8–10.5)

## 2024-12-26 PROCEDURE — 99232 SBSQ HOSP IP/OBS MODERATE 35: CPT

## 2024-12-26 PROCEDURE — 36598 INJ W/FLUOR EVAL CV DEVICE: CPT | Mod: RT

## 2024-12-26 PROCEDURE — 99233 SBSQ HOSP IP/OBS HIGH 50: CPT

## 2024-12-26 RX ORDER — BUMETANIDE 2 MG/1
1 TABLET ORAL ONCE
Refills: 0 | Status: COMPLETED | OUTPATIENT
Start: 2024-12-26 | End: 2024-12-26

## 2024-12-26 RX ORDER — BUMETANIDE 2 MG/1
1 TABLET ORAL ONCE
Refills: 0 | Status: DISCONTINUED | OUTPATIENT
Start: 2024-12-26 | End: 2024-12-26

## 2024-12-26 RX ORDER — HYDROMORPHONE HCL 4 MG
0.5 TABLET ORAL ONCE
Refills: 0 | Status: DISCONTINUED | OUTPATIENT
Start: 2024-12-26 | End: 2024-12-26

## 2024-12-26 RX ORDER — HYDROMORPHONE HCL 4 MG
30 TABLET ORAL
Refills: 0 | Status: DISCONTINUED | OUTPATIENT
Start: 2024-12-26 | End: 2025-01-02

## 2024-12-26 RX ADMIN — Medication 0.5 MILLIGRAM(S): at 23:03

## 2024-12-26 RX ADMIN — Medication 1 MILLIGRAM(S): at 11:49

## 2024-12-26 RX ADMIN — Medication 30 MILLILITER(S): at 12:52

## 2024-12-26 RX ADMIN — SODIUM CHLORIDE 30 MILLILITER(S): 9 INJECTION, SOLUTION INTRAVENOUS at 03:44

## 2024-12-26 RX ADMIN — URSODIOL 500 MILLIGRAM(S): 250 TABLET, FILM COATED ORAL at 17:41

## 2024-12-26 RX ADMIN — Medication 30 MILLILITER(S): at 20:28

## 2024-12-26 RX ADMIN — CHLORHEXIDINE GLUCONATE 1 APPLICATION(S): 1.2 RINSE ORAL at 11:48

## 2024-12-26 RX ADMIN — NIFEDIPINE 30 MILLIGRAM(S): 60 TABLET, EXTENDED RELEASE ORAL at 05:07

## 2024-12-26 RX ADMIN — Medication 0.5 MILLIGRAM(S): at 23:30

## 2024-12-26 RX ADMIN — PANTOPRAZOLE 40 MILLIGRAM(S): 40 TABLET, DELAYED RELEASE ORAL at 05:07

## 2024-12-26 RX ADMIN — URSODIOL 500 MILLIGRAM(S): 250 TABLET, FILM COATED ORAL at 05:07

## 2024-12-26 RX ADMIN — Medication 1 APPLICATION(S): at 07:01

## 2024-12-26 RX ADMIN — Medication 17 GRAM(S): at 05:07

## 2024-12-26 RX ADMIN — Medication 30 MILLILITER(S): at 07:20

## 2024-12-26 RX ADMIN — Medication 1 APPLICATION(S): at 17:41

## 2024-12-26 RX ADMIN — Medication 30 MILLILITER(S): at 22:06

## 2024-12-26 RX ADMIN — ERTAPENEM SODIUM 120 MILLIGRAM(S): 1 INJECTION, POWDER, LYOPHILIZED, FOR SOLUTION INTRAMUSCULAR; INTRAVENOUS at 23:08

## 2024-12-26 RX ADMIN — ENOXAPARIN SODIUM 40 MILLIGRAM(S): 60 INJECTION INTRAVENOUS; SUBCUTANEOUS at 17:41

## 2024-12-26 RX ADMIN — SODIUM CHLORIDE 30 MILLILITER(S): 9 INJECTION, SOLUTION INTRAVENOUS at 22:09

## 2024-12-26 RX ADMIN — Medication 17 GRAM(S): at 17:42

## 2024-12-26 RX ADMIN — ALLOPURINOL 50 MILLIGRAM(S): 100 TABLET ORAL at 11:50

## 2024-12-26 NOTE — PROGRESS NOTE ADULT - PROBLEM SELECTOR PLAN 8
DVT prop: lovenox. Elevated risk of DVT given SCC. No signs of bleeding.   low Na diet DVT prop: lovenox. Elevated risk of DVT given SCC. No signs of bleeding.   low Na diet    Mediport: not flushing IR consulted for eval, IR study in the AM. DVT prop: lovenox. Elevated risk of DVT given SCC. No signs of bleeding.   low Na diet    Mediport: not flushing IR consulted for eval, IR study 12/26

## 2024-12-26 NOTE — PROGRESS NOTE ADULT - ASSESSMENT
Impression:   44 year old male with hx sickle cell disease w/ multiple admissions for sickle cell crisis/acute chest syndrome and dx of cirrhosis 2/2 secondary iron overload who presented w/ dyspnea, fall, and acute pain. Hospital course c/w fevers 2/2 bacteremia 2/2 Pantoa Dispersa currently on meropenem w/ ID following, MATA w/ improving renal function, LE edema and severe pain currently on PCA Dilaudid, currently has progressive hyperbilirubinemia, so hepatology was consulted.     #Hyperbilirubinemia 2/2 acute hepatic sickle cell crisis and sepsis of cholestasis  #Compensated cirrhosis 2/2 secondary iron overload due to multiple blood transfusions  Calculated MELD 3 score on 12/26 - 20A. Reported he was dx with cirrhosis 11/2024, does not see hepatologist, was informed by his hematologist. No prior liver biopsy, based on imaging. Patient has normal platelets and INR but has chronic elevation of bilirubin around 5-7. During this admission, bilirubin has been progressively increasing to 13, likely multifactorial. Differentials include acute hepatic sickle cell crisis, sepsis of cholestasis in the setting of underlying cirrhosis. MRCP showed no biliary obstruction. No prior testing for HFE mutation completed, its non HFE related HH. Would be useful to obtain the iron content in hepatocytes with either a liver biopsy or MRI. Patient does have underlying chronic liver disease due to secondary iron overload from multiple blood transfusions and possible chronic cholestasis related to sickling process.   - Ascites: None seen on MRCP imaging  - Varices: no prior EGD report.   - HE: AAOx3, not on medications.   - HCC: No lesions seen on MRCP.   Previously had US doppler in 10/2024 which was normal. Also had CT A/P which showed ascites but no cirrhosis  - Iron studies: Iron 428, Iron sat 79%, ferritin 3000, hemoglobin S 21%.   - Other lab serologies: hep B surface Ag neg, hep B DNA neg, hep C neg. OSMANY and ASA neg  - Patient received RBC exchange on 12/25, now bilirubin has been downtrending.     #Bacteremia  - Blood cultures positive from 12/20 - Pantoa Dispnargis. ID consulted and started on ertapenem. Repeat blood cultures 1/2 bottles positive for gram neg rods.     Recommendations:   - Bilirubin elevation is multifactorial w/ acute hepatic sickle cell crisis, sepsis of cholestasis in the setting of underlying cirrhosis due to secondary iron overload. Will need to continue w/ Abx for infection.   - continue with ursodiol 500mg BID.  - Patient will eventually need MRI of the liver to detect iron measurements, R2-MRI as an o/p. High risk for liver biopsy which could lead to hemorrhage.   - Would continue to monitor liver enzymes for now, expect the bilirubin to decrease with resolution of infection. Ideally will need hemoglobin A < 30% after exchange, however last checked on 8/2024 which was 21%.   - Trend CMP, CBC and PT/INR daily.     All recommendations are tentative until note is attested by an attending.     Eamon Lemon, PGY-6  Gastroenterology/Hepatology Fellow  Available on Microsoft Teams  [phone] (Short Range Pager)  [phone] (Long Range Pager)    After 5pm, please contact the on-call GI fellow. Impression:   44 year old male with hx sickle cell disease w/ multiple admissions for sickle cell crisis/acute chest syndrome and dx of cirrhosis 2/2 secondary iron overload who presented w/ dyspnea, fall, and acute pain. Hospital course c/w fevers 2/2 bacteremia 2/2 Pantoa Dispersa currently on meropenem w/ ID following, MATA w/ improving renal function, LE edema and severe pain currently on PCA Dilaudid, currently has progressive hyperbilirubinemia, so hepatology was consulted.     #Hyperbilirubinemia 2/2 acute hepatic sickle cell crisis and sepsis of cholestasis  #Compensated cirrhosis 2/2 secondary iron overload due to multiple blood transfusions  Calculated MELD 3 score on 12/26 - 20A. Reported he was dx with cirrhosis 11/2024, does not see hepatologist, was informed by his hematologist. No prior liver biopsy, based on imaging. Patient has normal platelets and INR but has chronic elevation of bilirubin around 5-7. During this admission, bilirubin has been progressively increasing to 13, likely multifactorial. Differentials include acute hepatic sickle cell crisis, sepsis of cholestasis in the setting of underlying cirrhosis. MRCP showed no biliary obstruction. No prior testing for HFE mutation completed, its non HFE related HH. Would be useful to obtain the iron content in hepatocytes with either a liver biopsy or MRI. Patient does have underlying chronic liver disease due to secondary iron overload from multiple blood transfusions and possible chronic cholestasis related to sickling process.   - Ascites: None seen on MRCP imaging  - Varices: no prior EGD report.   - HE: AAOx3, not on medications.   - HCC: No lesions seen on MRCP.   Previously had US doppler in 10/2024 which was normal. Also had CT A/P which showed ascites but no cirrhosis  - Iron studies: Iron 428, Iron sat 79%, ferritin 3000, hemoglobin S 21%.   - Other lab serologies: hep B surface Ag neg, hep B DNA neg, hep C neg. OSMANY and ASA neg  - Patient received RBC exchange on 12/25, now bilirubin has been downtrending.     #Bacteremia  - Blood cultures positive from 12/20 - Pantoa Dispnargis. ID consulted and started on ertapenem. Repeat blood cultures 1/2 bottles positive for gram neg rods.     Recommendations:   - Bilirubin elevation is multifactorial w/ acute hepatic sickle cell crisis, sepsis of cholestasis in the setting of underlying cirrhosis due to secondary iron overload. Will need to continue w/ Abx for infection.   - continue with ursodiol 500mg BID.  - Ordered EBV, CMV PCR.   - Patient will eventually need MRI of the liver to detect iron measurements, R2-MRI as an o/p. High risk for liver biopsy which could lead to hemorrhage.   - Would continue to monitor liver enzymes for now, expect the bilirubin to decrease with resolution of infection. Ideally will need hemoglobin A < 30% after exchange, however last checked on 8/2024 which was 21%.   - Trend CMP, CBC and PT/INR daily.     All recommendations are tentative until note is attested by an attending.     Eamon Lemon, PGY-6  Gastroenterology/Hepatology Fellow  Available on Microsoft Teams  [phone] (Short Range Pager)  [phone] (Long Range Pager)    After 5pm, please contact the on-call GI fellow.

## 2024-12-26 NOTE — PROGRESS NOTE ADULT - SUBJECTIVE AND OBJECTIVE BOX
[facility] Division of Hospital Medicine  Tony Mann MD  Pager [phone]      Patient is a 44y old  Male who presents with a chief complaint of mech fall, pain, dyspnea (26 Dec 2024 10:40)      SUBJECTIVE / OVERNIGHT EVENTS: no CP or SOB    MEDICATIONS  (STANDING):  allopurinol 50 milliGRAM(s) Oral daily  calcium gluconate IVPB 2 Gram(s) IV Intermittent once  chlorhexidine 2% Cloths 1 Application(s) Topical daily  enoxaparin Injectable 40 milliGRAM(s) SubCutaneous every 24 hours  ertapenem  IVPB 1000 milliGRAM(s) IV Intermittent every 24 hours  folic acid 1 milliGRAM(s) Oral every 24 hours  HYDROmorphone PCA (5 mG/mL) 30 milliLiter(s) PCA Continuous PCA Continuous  influenza   Vaccine 0.5 milliLiter(s) IntraMuscular once  mupirocin 2% Nasal 1 Application(s) Both Nostrils two times a day  NIFEdipine XL 30 milliGRAM(s) Oral daily  pantoprazole    Tablet 40 milliGRAM(s) Oral before breakfast  polyethylene glycol 3350 17 Gram(s) Oral two times a day  senna 2 Tablet(s) Oral at bedtime  sodium chloride 0.45%. 1000 milliLiter(s) (30 mL/Hr) IV Continuous <Continuous>  ursodiol Tablet 500 milliGRAM(s) Oral two times a day    MEDICATIONS  (PRN):  naloxone Injectable 0.1 milliGRAM(s) IV Push once PRN Opioid Overdose  ondansetron Injectable 4 milliGRAM(s) IV Push every 8 hours PRN Nausea and/or Vomiting      CAPILLARY BLOOD GLUCOSE        I&O's Summary      PHYSICAL EXAM:  Vital Signs Last 24 Hrs  T(C): 36.3 (26 Dec 2024 07:45), Max: 36.7 (25 Dec 2024 15:45)  T(F): 97.4 (26 Dec 2024 07:45), Max: 98.1 (25 Dec 2024 15:45)  HR: 78 (26 Dec 2024 07:45) (78 - 95)  BP: 115/80 (26 Dec 2024 07:45) (110/71 - 116/76)  BP(mean): --  RR: 18 (26 Dec 2024 07:45) (17 - 18)  SpO2: 95% (26 Dec 2024 07:45) (95% - 100%)    Parameters below as of 26 Dec 2024 07:45  Patient On (Oxygen Delivery Method): nasal cannula  O2 Flow (L/min): 2    CONSTITUTIONAL: NAD  EYES: conjunctiva and sclera clear  ENMT: mmm  NECK: Supple,  RESPIRATORY: Normal respiratory effort; lungs are clear to auscultation bilaterally  CARDIOVASCULAR: Regular rate and rhythm, + S1 and S2, LE edema  ABDOMEN: Nontender to palpation, normoactive bowel sounds, no rebound/guarding  PSYCH: A+O x 3    LABS:                        9.2    12.40 )-----------( 249      ( 26 Dec 2024 04:58 )             25.7     12-26    133[L]  |  99  |  24[H]  ----------------------------<  85  4.4   |  23  |  0.98    Ca    9.0      26 Dec 2024 04:58  Phos  3.0     12-26  Mg     1.80     12-26    TPro  6.0  /  Alb  2.9[L]  /  TBili  9.8[H]  /  DBili  x   /  AST  111[H]  /  ALT  54[H]  /  AlkPhos  146[H]  12-26    PT/INR - ( 25 Dec 2024 11:58 )   PT: 13.3 sec;   INR: 1.15 ratio         PTT - ( 25 Dec 2024 11:58 )  PTT:29.3 sec      Urinalysis Basic - ( 26 Dec 2024 04:58 )    Color: x / Appearance: x / SG: x / pH: x  Gluc: 85 mg/dL / Ketone: x  / Bili: x / Urobili: x   Blood: x / Protein: x / Nitrite: x   Leuk Esterase: x / RBC: x / WBC x   Sq Epi: x / Non Sq Epi: x / Bacteria: x

## 2024-12-26 NOTE — PROCEDURE NOTE - SUPERVISORY STATEMENT
Of note, KENAN Lim, who was initially performing the procedure had a stick on his thumb with a used needle. The needle was immediately disposed of.     The patient was informed and blood drawn from the port and sent for analysis with the patient's consent.     I took over the procedure immediately following the stick and the procedure was successfully completed without incident. KENAN Lim sent to ED as per protocol.
Line by HO

## 2024-12-26 NOTE — PROGRESS NOTE ADULT - SUBJECTIVE AND OBJECTIVE BOX
Gastroenterology/Hepatology Progress Note      Interval Events:     - No acute events overnight.   - Patient received RBC exchange on 12/25, now the bilirubin has been trending down.   - Repeat blood cultures 1/2 bottles positive again on 12/22 despite being on Ertapenem but no fevers.  Has mild leukocytosis.   - Patient denied abd pain, nausea and vomiting. Tolerating po diet well.     Allergies:  penicillin (Pruritus)  hydroxyurea (Other)  ceftriaxone (Anaphylaxis)  piperacillin-tazobactam (Urticaria)      Hospital Medications:  allopurinol 50 milliGRAM(s) Oral daily  calcium gluconate IVPB 2 Gram(s) IV Intermittent once  chlorhexidine 2% Cloths 1 Application(s) Topical daily  enoxaparin Injectable 40 milliGRAM(s) SubCutaneous every 24 hours  ertapenem  IVPB 1000 milliGRAM(s) IV Intermittent every 24 hours  folic acid 1 milliGRAM(s) Oral every 24 hours  HYDROmorphone PCA (5 mG/mL) 30 milliLiter(s) PCA Continuous PCA Continuous  influenza   Vaccine 0.5 milliLiter(s) IntraMuscular once  mupirocin 2% Nasal 1 Application(s) Both Nostrils two times a day  naloxone Injectable 0.1 milliGRAM(s) IV Push once PRN  NIFEdipine XL 30 milliGRAM(s) Oral daily  ondansetron Injectable 4 milliGRAM(s) IV Push every 8 hours PRN  pantoprazole    Tablet 40 milliGRAM(s) Oral before breakfast  polyethylene glycol 3350 17 Gram(s) Oral two times a day  senna 2 Tablet(s) Oral at bedtime  sodium chloride 0.45%. 1000 milliLiter(s) IV Continuous <Continuous>  ursodiol Tablet 500 milliGRAM(s) Oral two times a day      ROS: 14 point ROS negative unless otherwise state in subjective    PHYSICAL EXAM:   Vital Signs:  Vital Signs Last 24 Hrs  T(C): 36.3 (26 Dec 2024 07:45), Max: 37.2 (25 Dec 2024 11:45)  T(F): 97.4 (26 Dec 2024 07:45), Max: 99 (25 Dec 2024 11:45)  HR: 78 (26 Dec 2024 07:45) (78 - 100)  BP: 115/80 (26 Dec 2024 07:45) (110/71 - 116/86)  BP(mean): --  RR: 18 (26 Dec 2024 07:45) (17 - 18)  SpO2: 95% (26 Dec 2024 07:45) (95% - 100%)    Parameters below as of 26 Dec 2024 07:45  Patient On (Oxygen Delivery Method): nasal cannula  O2 Flow (L/min): 2    Daily     Daily     PHYSICAL EXAM:     GENERAL:  No acute distress, young male, sitting on the bed, jaundice appearing.   HEENT:  NCAT, + scleral icterus.   CHEST:  mild respiratory distress  HEART:  Regular rate and rhythm  ABDOMEN:  Soft, non-tender, non-distended,   EXTREMITIES: Trace pitting edema in LE b/l.   SKIN:  No rash/erythema/ecchymoses/petechiae/wounds/abscess/warm/dry  NEURO:  Alert and oriented x 3, no asterixis    LABS:                        9.2    12.40 )-----------( 249      ( 26 Dec 2024 04:58 )             25.7     Mean Cell Volume: 82.4 fL (12-26-24 @ 04:58)    12-26    133[L]  |  99  |  24[H]  ----------------------------<  85  4.4   |  23  |  0.98    Ca    9.0      26 Dec 2024 04:58  Phos  3.0     12-26  Mg     1.80     12-26    TPro  6.0  /  Alb  2.9[L]  /  TBili  9.8[H]  /  DBili  x   /  AST  111[H]  /  ALT  54[H]  /  AlkPhos  146[H]  12-26    LIVER FUNCTIONS - ( 26 Dec 2024 04:58 )  Alb: 2.9 g/dL / Pro: 6.0 g/dL / ALK PHOS: 146 U/L / ALT: 54 U/L / AST: 111 U/L / GGT: x           PT/INR - ( 25 Dec 2024 11:58 )   PT: 13.3 sec;   INR: 1.15 ratio         PTT - ( 25 Dec 2024 11:58 )  PTT:29.3 sec  Urinalysis Basic - ( 26 Dec 2024 04:58 )    Color: x / Appearance: x / SG: x / pH: x  Gluc: 85 mg/dL / Ketone: x  / Bili: x / Urobili: x   Blood: x / Protein: x / Nitrite: x   Leuk Esterase: x / RBC: x / WBC x   Sq Epi: x / Non Sq Epi: x / Bacteria: x

## 2024-12-26 NOTE — PROGRESS NOTE ADULT - ASSESSMENT
44 year old male with history of sickle cell anemia presenting today s/p mechanical fall    Sickle cell Disease - Pain Crisis   - Follows with Dr Jordan of 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; goal Hb S < 30%, Hgb 9.2, feels better  - Hepatology consulted, appreciate recs  - Please check Hb S levels at baseline and after exchange   -Medicine team appreciated    Hypoxia   - Mild and intermittent; previously walked and had no desaturation on ambulation and there is no evidence of infiltrate on chest x-rays or CT and no chest pain, most recent CXR without evidence of PNA but likely atelectasis - had not been using incentive spirometer - advised to use throughout the day; denies any chest pain       Meghana Real NP  Hematology/Oncology  [facility]  [phone] (Office)  [phone] (Alt office)  Evenings and weekends please call MD on call or office         44 year old male with history of sickle cell anemia presenting today s/p mechanical fall    Sickle cell Disease - Pain Crisis   - Follows with Dr Jrodan of [facility]  - s/p 1u PRBCs 12/18/24   - Chronic hemolysis, iron overload; trying to limit transfusions - Hgb goal > 6.0  - Continue Jadenu  - Maximize pain control; currently on Dilaudid PCA pump    Gram negative bacteremia  - Source unclear but noted to have rising DIRECT bilirubin   - Repeat cultures negative, remains on antibiotics, ID following    Rising D-bili   - s/p MRCP which showed no obstruction, but underlying iron overload  - s/p exchange transfusion for 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; goal Hb S < 30%, Hgb 9.2, feels better  - Hepatology consulted, appreciate recs  - Hb S levels pending, would remove Clair AFTER this results  -Medicine team appreciated    Hypoxia   - Mild and intermittent; previously walked and had no desaturation on ambulation and there is no evidence of infiltrate on chest x-rays or CT and no chest pain, most recent CXR without evidence of PNA but likely atelectasis - had not been using incentive spirometer - advised to use throughout the day; denies any chest pain       Meghana Real NP  Hematology/Oncology  [facility]  [phone] (Office)  [phone] (Alt office)  Evenings and weekends please call MD on call or office

## 2024-12-26 NOTE — PROGRESS NOTE ADULT - PROBLEM SELECTOR PLAN 1
Fever to 101.2 at 21:35 on 12/17/24. Reported to have O2 sat in 80s With leukocytosis. focal infectious symptoms. Recent staph epi bacteremia during November admission here, seen by ID at that time. Patient has port in place,   - blood culture with Enterobacterales and pantoea, ID reconsulted, rec to switch meropenem to ertapenem 1 gm q24h (12/24 - ), f/u blood cultures from 12/22 showing GNR  - ?Pantoea from the rolling paper/marijuana he uses could be a source.  - BCX still positive, will repeat f/u ID

## 2024-12-26 NOTE — PROVIDER CONTACT NOTE (OTHER) - ACTION/TREATMENT ORDERED:
ACP notified. Per ACP Marcel [place] PCA through port until new ultrasound guided IV can be placed. ACP notified. Per ACP Marcel Young [place] PCA through port until new ultrasound guided IV can be placed. Iv bumex unable to be given

## 2024-12-26 NOTE — PROGRESS NOTE ADULT - SUBJECTIVE AND OBJECTIVE BOX
Patient is a 44y old  Male who presents with a chief complaint of mech fall, pain, dyspnea (25 Dec 2024 16:10)  patient seen today, overall improved, Hgb 9.2, s/p RBC exchange, Tbili downtrending, afebrile      MEDICATIONS  (STANDING):  allopurinol 50 milliGRAM(s) Oral daily  calcium gluconate IVPB 2 Gram(s) IV Intermittent once  chlorhexidine 2% Cloths 1 Application(s) Topical daily  enoxaparin Injectable 40 milliGRAM(s) SubCutaneous every 24 hours  ertapenem  IVPB 1000 milliGRAM(s) IV Intermittent every 24 hours  folic acid 1 milliGRAM(s) Oral every 24 hours  HYDROmorphone PCA (5 mG/mL) 30 milliLiter(s) PCA Continuous PCA Continuous  influenza   Vaccine 0.5 milliLiter(s) IntraMuscular once  mupirocin 2% Nasal 1 Application(s) Both Nostrils two times a day  NIFEdipine XL 30 milliGRAM(s) Oral daily  pantoprazole    Tablet 40 milliGRAM(s) Oral before breakfast  polyethylene glycol 3350 17 Gram(s) Oral two times a day  senna 2 Tablet(s) Oral at bedtime  sodium chloride 0.45%. 1000 milliLiter(s) (30 mL/Hr) IV Continuous <Continuous>  ursodiol Tablet 500 milliGRAM(s) Oral two times a day    MEDICATIONS  (PRN):  naloxone Injectable 0.1 milliGRAM(s) IV Push once PRN Opioid Overdose  ondansetron Injectable 4 milliGRAM(s) IV Push every 8 hours PRN Nausea and/or Vomiting          Vital Signs Last 24 Hrs  T(C): 36.4 (26 Dec 2024 05:00), Max: 37.2 (25 Dec 2024 11:45)  T(F): 97.5 (26 Dec 2024 05:00), Max: 99 (25 Dec 2024 11:45)  HR: 79 (26 Dec 2024 05:00) (79 - 100)  BP: 115/76 (26 Dec 2024 05:00) (110/71 - 116/86)  BP(mean): --  RR: 18 (26 Dec 2024 05:00) (17 - 18)  SpO2: 100% (26 Dec 2024 05:00) (95% - 100%)    Parameters below as of 26 Dec 2024 05:00  Patient On (Oxygen Delivery Method): nasal cannula  O2 Flow (L/min): 2      PE  NAD  Awake, alert  Anicteric, MMM  RRR  CTAB  Abd soft, NT, ND  No c/c/e  No rash grossly                            9.2    12.40 )-----------( 249      ( 26 Dec 2024 04:58 )             25.7       12-26    133[L]  |  99  |  24[H]  ----------------------------<  85  4.4   |  23  |  0.98    Ca    9.0      26 Dec 2024 04:58  Phos  3.0     12-26  Mg     1.80     12-26    TPro  6.0  /  Alb  2.9[L]  /  TBili  9.8[H]  /  DBili  x   /  AST  111[H]  /  ALT  54[H]  /  AlkPhos  146[H]  12-26

## 2024-12-26 NOTE — PROGRESS NOTE ADULT - NS ATTEND AMEND GEN_ALL_CORE FT
I agree with the above assessment and plan  Clinically doing better with down-trending bilirubin levels  appreciate input from hepatology  pending HbS levels

## 2024-12-26 NOTE — PROGRESS NOTE ADULT - PROBLEM SELECTOR PLAN 2
Pt spiking intermittent fever, new CXR showed opacities ( neg CXR x 3 in the past few days), high suspicion for acute chest,   Patient reports hgb baseline is ~ 5.5  -Discussed with Mayuri,s/p exhange [id number] given high suspicion for acute chest syndrome and liver involvement; s/p R femoral shiley placement on 12/24; will f/u Electrophoresis, and determine plan for dc  - C/w dilaudid PCA, monitor pain, will start weaning  - Bowel regimen with Senna daily, miralax prn  - Patient follows Dr Jordan of [facility] - appreciate hem/onc input, s/p 2 unit pRBC this admission. Trend Hgb.   - Pt on Jadenu at home, discussed with Mayuri onc, hold at this time, plan to resume at time of discharge.

## 2024-12-26 NOTE — PROGRESS NOTE ADULT - SUBJECTIVE AND OBJECTIVE BOX
Vascular Surgery Daily Resident Progress Note    Subjective and Interval  Seen and examined at bedside. No acute overnight events. Afebrile and VSS. Does not endorse chest pain, SOB, nausea, headache, fever or chills.   ___________________________________________________  Vital Signs  T(C): 36.3 (07:45), Max: 37.2 (11:45)  HR: 78 (07:45) (78 - 100)  BP: 115/80 (07:45) (110/71 - 116/86)  ABP: --  ABP(mean): --  RR: 18 (07:45) (17 - 18)  SpO2: 95% (07:45) (95% - 100%)    Physical Exam  General: NAD, male appearing stated age. Pacing near bed  Resp: Nonlabored breathing on NC  CV: Hemodynamically stable  Extremities: Grossly symmetric, R shiley in place with mil strikethrough     In&Out      Labs    LABS:  cret                        9.2    12.40 )-----------( 249      ( 26 Dec 2024 04:58 )             25.7     12-26    133[L]  |  99  |  24[H]  ----------------------------<  85  4.4   |  23  |  0.98    Ca    9.0      26 Dec 2024 04:58  Phos  3.0     12-26  Mg     1.80     12-26    TPro  6.0  /  Alb  2.9[L]  /  TBili  9.8[H]  /  DBili  x   /  AST  111[H]  /  ALT  54[H]  /  AlkPhos  146[H]  12-26    PT/INR - ( 25 Dec 2024 11:58 )   PT: 13.3 sec;   INR: 1.15 ratio         PTT - ( 25 Dec 2024 11:58 )  PTT:29.3 sec      Medications  allopurinol 50 milliGRAM(s) Oral daily  calcium gluconate IVPB 2 Gram(s) IV Intermittent once  chlorhexidine 2% Cloths 1 Application(s) Topical daily  enoxaparin Injectable 40 milliGRAM(s) SubCutaneous every 24 hours  ertapenem  IVPB 1000 milliGRAM(s) IV Intermittent every 24 hours  folic acid 1 milliGRAM(s) Oral every 24 hours  HYDROmorphone PCA (5 mG/mL) 30 milliLiter(s) PCA Continuous PCA Continuous  influenza   Vaccine 0.5 milliLiter(s) IntraMuscular once  mupirocin 2% Nasal 1 Application(s) Both Nostrils two times a day  naloxone Injectable 0.1 milliGRAM(s) IV Push once PRN  NIFEdipine XL 30 milliGRAM(s) Oral daily  ondansetron Injectable 4 milliGRAM(s) IV Push every 8 hours PRN  pantoprazole    Tablet 40 milliGRAM(s) Oral before breakfast  polyethylene glycol 3350 17 Gram(s) Oral two times a day  senna 2 Tablet(s) Oral at bedtime  sodium chloride 0.45%. 1000 milliLiter(s) IV Continuous <Continuous>  ursodiol Tablet 500 milliGRAM(s) Oral two times a day      Microbiology      ___________________________________________________  Assessment & Plan    44M with history of sickle cell disease with episodes of sickle cell crisis/acute chest syndrome, cirrhosis due to iron overload admitted for SOB and fall. Course complicated by bacteremia and sepsis, MATA. Continues with hyperbilirubinemia (t bili 13.2 12/24), possibly in part due to vasoocclusive crisis. Vascular surgery consulted for Shiley placement for exchange transfusion. Now s/p R Fem shiley placement on 12/24.     Recommendation:   -Shiley to be replaced by IR after 48 hrs (new shiley placement) if exchange transfusion is till required  - Rest of care per primary       C Team   [id number]   Vascular Surgery Daily Resident Progress Note    Subjective and Interval  Seen and examined at bedside. No acute overnight events. Afebrile and VSS. Does not endorse chest pain, SOB, nausea, headache, fever or chills.   ___________________________________________________  Vital Signs  T(C): 36.3 (07:45), Max: 37.2 (11:45)  HR: 78 (07:45) (78 - 100)  BP: 115/80 (07:45) (110/71 - 116/86)  ABP: --  ABP(mean): --  RR: 18 (07:45) (17 - 18)  SpO2: 95% (07:45) (95% - 100%)    Physical Exam  General: NAD, male appearing stated age. Pacing near bed  Resp: Nonlabored breathing on NC  CV: Hemodynamically stable  Extremities: Grossly symmetric, R shiley in place with mil strikethrough     In&Out      Labs    LABS:  cret                        9.2    12.40 )-----------( 249      ( 26 Dec 2024 04:58 )             25.7     12-26    133[L]  |  99  |  24[H]  ----------------------------<  85  4.4   |  23  |  0.98    Ca    9.0      26 Dec 2024 04:58  Phos  3.0     12-26  Mg     1.80     12-26    TPro  6.0  /  Alb  2.9[L]  /  TBili  9.8[H]  /  DBili  x   /  AST  111[H]  /  ALT  54[H]  /  AlkPhos  146[H]  12-26    PT/INR - ( 25 Dec 2024 11:58 )   PT: 13.3 sec;   INR: 1.15 ratio         PTT - ( 25 Dec 2024 11:58 )  PTT:29.3 sec      Medications  allopurinol 50 milliGRAM(s) Oral daily  calcium gluconate IVPB 2 Gram(s) IV Intermittent once  chlorhexidine 2% Cloths 1 Application(s) Topical daily  enoxaparin Injectable 40 milliGRAM(s) SubCutaneous every 24 hours  ertapenem  IVPB 1000 milliGRAM(s) IV Intermittent every 24 hours  folic acid 1 milliGRAM(s) Oral every 24 hours  HYDROmorphone PCA (5 mG/mL) 30 milliLiter(s) PCA Continuous PCA Continuous  influenza   Vaccine 0.5 milliLiter(s) IntraMuscular once  mupirocin 2% Nasal 1 Application(s) Both Nostrils two times a day  naloxone Injectable 0.1 milliGRAM(s) IV Push once PRN  NIFEdipine XL 30 milliGRAM(s) Oral daily  ondansetron Injectable 4 milliGRAM(s) IV Push every 8 hours PRN  pantoprazole    Tablet 40 milliGRAM(s) Oral before breakfast  polyethylene glycol 3350 17 Gram(s) Oral two times a day  senna 2 Tablet(s) Oral at bedtime  sodium chloride 0.45%. 1000 milliLiter(s) IV Continuous <Continuous>  ursodiol Tablet 500 milliGRAM(s) Oral two times a day      Microbiology      ___________________________________________________  Assessment & Plan    44M with history of sickle cell disease with episodes of sickle cell crisis/acute chest syndrome, cirrhosis due to iron overload admitted for SOB and fall. Course complicated by bacteremia and sepsis, MATA. Continues with hyperbilirubinemia (t bili 13.2 12/24), possibly in part due to vasoocclusive crisis. Vascular surgery consulted for Shiley placement for exchange transfusion. Now s/p R Fem shiley placement on 12/24.     Recommendation:   - Shiley to be replaced by IR after 48 hrs (new shiley placement) by primary team  - Rest of care per primary       C Team   [id number]   Vascular Surgery Daily Resident Progress Note    Subjective and Interval  Seen and examined at bedside. No acute overnight events. Afebrile and VSS. Does not endorse chest pain, SOB, nausea, headache, fever or chills.   ___________________________________________________  Vital Signs  T(C): 36.3 (07:45), Max: 37.2 (11:45)  HR: 78 (07:45) (78 - 100)  BP: 115/80 (07:45) (110/71 - 116/86)  ABP: --  ABP(mean): --  RR: 18 (07:45) (17 - 18)  SpO2: 95% (07:45) (95% - 100%)    Physical Exam  General: NAD, male appearing stated age. Pacing near bed  Resp: Nonlabored breathing on NC  CV: Hemodynamically stable  Extremities: Grossly symmetric, R shiley in place with mil strikethrough     In&Out      Labs    LABS:  cret                        9.2    12.40 )-----------( 249      ( 26 Dec 2024 04:58 )             25.7     12-26    133[L]  |  99  |  24[H]  ----------------------------<  85  4.4   |  23  |  0.98    Ca    9.0      26 Dec 2024 04:58  Phos  3.0     12-26  Mg     1.80     12-26    TPro  6.0  /  Alb  2.9[L]  /  TBili  9.8[H]  /  DBili  x   /  AST  111[H]  /  ALT  54[H]  /  AlkPhos  146[H]  12-26    PT/INR - ( 25 Dec 2024 11:58 )   PT: 13.3 sec;   INR: 1.15 ratio         PTT - ( 25 Dec 2024 11:58 )  PTT:29.3 sec      Medications  allopurinol 50 milliGRAM(s) Oral daily  calcium gluconate IVPB 2 Gram(s) IV Intermittent once  chlorhexidine 2% Cloths 1 Application(s) Topical daily  enoxaparin Injectable 40 milliGRAM(s) SubCutaneous every 24 hours  ertapenem  IVPB 1000 milliGRAM(s) IV Intermittent every 24 hours  folic acid 1 milliGRAM(s) Oral every 24 hours  HYDROmorphone PCA (5 mG/mL) 30 milliLiter(s) PCA Continuous PCA Continuous  influenza   Vaccine 0.5 milliLiter(s) IntraMuscular once  mupirocin 2% Nasal 1 Application(s) Both Nostrils two times a day  naloxone Injectable 0.1 milliGRAM(s) IV Push once PRN  NIFEdipine XL 30 milliGRAM(s) Oral daily  ondansetron Injectable 4 milliGRAM(s) IV Push every 8 hours PRN  pantoprazole    Tablet 40 milliGRAM(s) Oral before breakfast  polyethylene glycol 3350 17 Gram(s) Oral two times a day  senna 2 Tablet(s) Oral at bedtime  sodium chloride 0.45%. 1000 milliLiter(s) IV Continuous <Continuous>  ursodiol Tablet 500 milliGRAM(s) Oral two times a day      Microbiology      ___________________________________________________  Assessment & Plan    44M with history of sickle cell disease with episodes of sickle cell crisis/acute chest syndrome, cirrhosis due to iron overload admitted for SOB and fall. Course complicated by bacteremia and sepsis, MATA. Continues with hyperbilirubinemia (t bili 13.2 12/24), possibly in part due to vasoocclusive crisis. Vascular surgery consulted for Shiley placement for exchange transfusion. Now s/p R Fem shiley placement on 12/24.     Recommendation:   - Shiley to be replaced by IR after 48 hrs (new shiley placement) by primary team  - Shiley removal per primary team       C Team   [id number]

## 2024-12-26 NOTE — CONSULT NOTE ADULT - SUBJECTIVE AND OBJECTIVE BOX
Evaluate for Procedure: Port Study    Patient is a 43yo Male who presents with a chief complaint of [facility] fall, pain, dyspnea (26 Dec 2024 12:32)    HPI:  "Patient is a 44M with a PMH of sickle cell, prior CTAs for acute chest  who presents to the ED for dyspnea.  Patient states that he had a mechanical fall two days ago - landed on his L ribs.  Patient notes significant pain to his ribs, pain radiates to his back as well.  Patient notes that after his fall he has developed significant dyspnea.  States that he cannot walk from room to room in his home without developing SOB.  Reports mild dyspnea prior to the fall but s/p fall it is much more significant.  Patient also complains of bilateral lower extremity swelling - states he was recently discharged from [facility] on lasix.  Patient states that he took the lasix for two weeks as prescribed but still has LE swelling. Slipped and fell as he was walking in slippers and cannot put on his shoes secondary to edema.  No other complaints.  Tachy to 123 in triage.  Patient initially required 6L O2 via NC, SpOO2 currently 100% on 3L O2 via NC.  Will admit to tele."     (09 Dec 2024 10:05)    Assessed pt at bedside, RN states that port was being used for Dilaudid PCA but infusion had to be stopped due to constant clogging q5mins. RN states unable to aspirate any blood back from port. Pt stated his port was placed >10years ago at a hospital in [facility].    Allergies: penicillin (Pruritus)  hydroxyurea (Other)  ceftriaxone (Anaphylaxis)  piperacillin-tazobactam (Urticaria)    Medications (Abx/Cardiac/Anticoagulation/Blood Products)  alteplase for catheter clearance: 2 milliGRAM(s) Catheter (12-25 @ 22:30)  enoxaparin Injectable: 40 milliGRAM(s) SubCutaneous (12-25 @ 18:07)  ertapenem  IVPB: 120 mL/Hr IV Intermittent (12-25 @ 21:18)  meropenem  IVPB: 100 mL/Hr IV Intermittent (12-24 @ 13:07)  NIFEdipine XL: 30 milliGRAM(s) Oral (12-26 @ 05:07)    Data:  T(C): 36.3  HR: 78  BP: 115/80  RR: 18  SpO2: 95%    -WBC 12.40 / HgB 9.2 / Hct 25.7 / Plt 249  -Na 133 / Cl 99 / BUN 24 / Glucose 85  -K 4.4 / CO2 23 / Cr 0.98  -ALT 54 / Alk Phos 146 / T.Bili 9.8  -INR 1.15 / PTT 29.3    Radiology: Recent CXRs and position of chest port were reviewed.    Assessment: 43yo male with hx sickle cell, prior acute chest, coming in with dyspnea and acute pain, admitted for sickle cell pain crisis. IR consulted for dysfunctional port with resistance noted upon flushing and inability to aspirate.     Plan:   - Please place IR procedure order for "Port study" under Dr. Tucker.  - Tentatively scheduled for today 12/26.  - Please complete IR pre-procedure note

## 2024-12-26 NOTE — CHART NOTE - NSCHARTNOTEFT_GEN_A_CORE
IR Pre-Procedure Note    Patient Age:   44y    Patient Gender:   Male    Procedure (including site / side if known): Port Study     Diagnosis / Indication: Patient is a 44y old  Male who presents with a chief complaint of mech fall, pain, dyspnea (26 Dec 2024 12:59)      Interventional Radiology Attending Physician: Dr. Tucker     Ordering Attending Physician: Dr. Mann     PAST MEDICAL & SURGICAL HISTORY:  Sickle cell anemia      Gout      History of cholecystectomy           Pertinent Labs:   CBC Full  -  ( 26 Dec 2024 04:58 )  WBC Count : 12.40 K/uL  RBC Count : 3.12 M/uL  Hemoglobin : 9.2 g/dL  Hematocrit : 25.7 %  Platelet Count - Automated : 249 K/uL  Mean Cell Volume : 82.4 fL  Mean Cell Hemoglobin : 29.5 pg  Mean Cell Hemoglobin Concentration : 35.8 g/dL  Auto Neutrophil # : 6.96 K/uL  Auto Lymphocyte # : 3.74 K/uL  Auto Monocyte # : 1.33 K/uL  Auto Eosinophil # : 0.09 K/uL  Auto Basophil # : 0.17 K/uL  Auto Neutrophil % : 56.1 %  Auto Lymphocyte % : 30.2 %  Auto Monocyte % : 10.7 %  Auto Eosinophil % : 0.7 %  Auto Basophil % : 1.4 %    12-26    133[L]  |  99  |  24[H]  ----------------------------<  85  4.4   |  23  |  0.98    Ca    9.0      26 Dec 2024 04:58  Phos  3.0     12-26  Mg     1.80     12-26    TPro  6.0  /  Alb  2.9[L]  /  TBili  9.8[H]  /  DBili  x   /  AST  111[H]  /  ALT  54[H]  /  AlkPhos  146[H]  12-26    PT/INR - ( 25 Dec 2024 11:58 )   PT: 13.3 sec;   INR: 1.15 ratio         PTT - ( 25 Dec 2024 11:58 )  PTT:29.3 sec    Patient / Family aware of procedure:   [x] Y   [  ] N

## 2024-12-27 LAB
ALBUMIN SERPL ELPH-MCNC: 2.9 G/DL — LOW (ref 3.3–5)
ALP SERPL-CCNC: 145 U/L — HIGH (ref 40–120)
ALT FLD-CCNC: 43 U/L — HIGH (ref 4–41)
ANION GAP SERPL CALC-SCNC: 11 MMOL/L — SIGNIFICANT CHANGE UP (ref 7–14)
APTT BLD: 32 SEC — SIGNIFICANT CHANGE UP (ref 24.5–35.6)
AST SERPL-CCNC: 78 U/L — HIGH (ref 4–40)
BASOPHILS # BLD AUTO: 0.07 K/UL — SIGNIFICANT CHANGE UP (ref 0–0.2)
BASOPHILS NFR BLD AUTO: 0.4 % — SIGNIFICANT CHANGE UP (ref 0–2)
BILIRUB SERPL-MCNC: 7.2 MG/DL — HIGH (ref 0.2–1.2)
BLD GP AB SCN SERPL QL: NEGATIVE — SIGNIFICANT CHANGE UP
BUN SERPL-MCNC: 18 MG/DL — SIGNIFICANT CHANGE UP (ref 7–23)
CALCIUM SERPL-MCNC: 8.5 MG/DL — SIGNIFICANT CHANGE UP (ref 8.4–10.5)
CHLORIDE SERPL-SCNC: 101 MMOL/L — SIGNIFICANT CHANGE UP (ref 98–107)
CMV DNA CSF QL NAA+PROBE: SIGNIFICANT CHANGE UP IU/ML
CMV DNA SPEC NAA+PROBE-LOG#: SIGNIFICANT CHANGE UP LOG10IU/ML
CO2 SERPL-SCNC: 23 MMOL/L — SIGNIFICANT CHANGE UP (ref 22–31)
CREAT SERPL-MCNC: 0.9 MG/DL — SIGNIFICANT CHANGE UP (ref 0.5–1.3)
CULTURE RESULTS: SIGNIFICANT CHANGE UP
EBV DNA SERPL NAA+PROBE-ACNC: SIGNIFICANT CHANGE UP IU/ML
EBVPCR LOG: SIGNIFICANT CHANGE UP LOG10IU/ML
EGFR: 107 ML/MIN/1.73M2 — SIGNIFICANT CHANGE UP
EOSINOPHIL # BLD AUTO: 0 K/UL — SIGNIFICANT CHANGE UP (ref 0–0.5)
EOSINOPHIL NFR BLD AUTO: 0 % — SIGNIFICANT CHANGE UP (ref 0–6)
GLUCOSE SERPL-MCNC: 93 MG/DL — SIGNIFICANT CHANGE UP (ref 70–99)
GRAM STN FLD: ABNORMAL
HAPTOGLOB SERPL-MCNC: <20 MG/DL — LOW (ref 34–200)
HCT VFR BLD CALC: 25.1 % — LOW (ref 39–50)
HEMOGLOBIN INTERPRETATION: SIGNIFICANT CHANGE UP
HGB A MFR BLD: 85.6 % — LOW (ref 95–97.6)
HGB A2 MFR BLD: 2.6 % — SIGNIFICANT CHANGE UP (ref 2.4–3.5)
HGB BLD-MCNC: 8.7 G/DL — LOW (ref 13–17)
HGB F MFR BLD: 1.2 % — SIGNIFICANT CHANGE UP (ref 0–1.5)
HGB S MFR BLD: 10.6 % — HIGH
IANC: 15.16 K/UL — HIGH (ref 1.8–7.4)
IMM GRANULOCYTES NFR BLD AUTO: 1.3 % — HIGH (ref 0–0.9)
INR BLD: 1.17 RATIO — HIGH (ref 0.85–1.16)
LDH SERPL L TO P-CCNC: 415 U/L — HIGH (ref 135–225)
LYMPHOCYTES # BLD AUTO: 1.75 K/UL — SIGNIFICANT CHANGE UP (ref 1–3.3)
LYMPHOCYTES # BLD AUTO: 9.7 % — LOW (ref 13–44)
MAGNESIUM SERPL-MCNC: 1.7 MG/DL — SIGNIFICANT CHANGE UP (ref 1.6–2.6)
MCHC RBC-ENTMCNC: 29.3 PG — SIGNIFICANT CHANGE UP (ref 27–34)
MCHC RBC-ENTMCNC: 34.7 G/DL — SIGNIFICANT CHANGE UP (ref 32–36)
MCV RBC AUTO: 84.5 FL — SIGNIFICANT CHANGE UP (ref 80–100)
MONOCYTES # BLD AUTO: 0.9 K/UL — SIGNIFICANT CHANGE UP (ref 0–0.9)
MONOCYTES NFR BLD AUTO: 5 % — SIGNIFICANT CHANGE UP (ref 2–14)
NEUTROPHILS # BLD AUTO: 15.16 K/UL — HIGH (ref 1.8–7.4)
NEUTROPHILS NFR BLD AUTO: 83.6 % — HIGH (ref 43–77)
NRBC # BLD: 0 /100 WBCS — SIGNIFICANT CHANGE UP (ref 0–0)
NRBC # FLD: 0.06 K/UL — HIGH (ref 0–0)
PHOSPHATE SERPL-MCNC: 3.8 MG/DL — SIGNIFICANT CHANGE UP (ref 2.5–4.5)
PLATELET # BLD AUTO: 302 K/UL — SIGNIFICANT CHANGE UP (ref 150–400)
POTASSIUM SERPL-MCNC: 4 MMOL/L — SIGNIFICANT CHANGE UP (ref 3.5–5.3)
POTASSIUM SERPL-SCNC: 4 MMOL/L — SIGNIFICANT CHANGE UP (ref 3.5–5.3)
PROT SERPL-MCNC: 6 G/DL — SIGNIFICANT CHANGE UP (ref 6–8.3)
PROTHROM AB SERPL-ACNC: 13.6 SEC — HIGH (ref 9.9–13.4)
RBC # BLD: 2.97 M/UL — LOW (ref 4.2–5.8)
RBC # BLD: 2.97 M/UL — LOW (ref 4.2–5.8)
RBC # FLD: 18.3 % — HIGH (ref 10.3–14.5)
RETICS #: 241.8 K/UL — HIGH (ref 25–125)
RETICS/RBC NFR: 8.1 % — HIGH (ref 0.5–2.5)
RH IG SCN BLD-IMP: POSITIVE — SIGNIFICANT CHANGE UP
SODIUM SERPL-SCNC: 135 MMOL/L — SIGNIFICANT CHANGE UP (ref 135–145)
SPECIMEN SOURCE: SIGNIFICANT CHANGE UP
SPECIMEN SOURCE: SIGNIFICANT CHANGE UP
WBC # BLD: 18.11 K/UL — HIGH (ref 3.8–10.5)
WBC # FLD AUTO: 18.11 K/UL — HIGH (ref 3.8–10.5)

## 2024-12-27 PROCEDURE — 99233 SBSQ HOSP IP/OBS HIGH 50: CPT

## 2024-12-27 PROCEDURE — 74178 CT ABD&PLV WO CNTR FLWD CNTR: CPT | Mod: 26

## 2024-12-27 PROCEDURE — 99232 SBSQ HOSP IP/OBS MODERATE 35: CPT

## 2024-12-27 RX ADMIN — Medication 1 APPLICATION(S): at 17:54

## 2024-12-27 RX ADMIN — ERTAPENEM SODIUM 120 MILLIGRAM(S): 1 INJECTION, POWDER, LYOPHILIZED, FOR SOLUTION INTRAMUSCULAR; INTRAVENOUS at 21:39

## 2024-12-27 RX ADMIN — NIFEDIPINE 30 MILLIGRAM(S): 60 TABLET, EXTENDED RELEASE ORAL at 05:53

## 2024-12-27 RX ADMIN — Medication 17 GRAM(S): at 17:53

## 2024-12-27 RX ADMIN — CHLORHEXIDINE GLUCONATE 1 APPLICATION(S): 1.2 RINSE ORAL at 11:28

## 2024-12-27 RX ADMIN — URSODIOL 500 MILLIGRAM(S): 250 TABLET, FILM COATED ORAL at 17:52

## 2024-12-27 RX ADMIN — Medication 30 MILLILITER(S): at 20:32

## 2024-12-27 RX ADMIN — ENOXAPARIN SODIUM 40 MILLIGRAM(S): 60 INJECTION INTRAVENOUS; SUBCUTANEOUS at 17:54

## 2024-12-27 RX ADMIN — PANTOPRAZOLE 40 MILLIGRAM(S): 40 TABLET, DELAYED RELEASE ORAL at 05:55

## 2024-12-27 RX ADMIN — Medication 30 MILLILITER(S): at 08:20

## 2024-12-27 RX ADMIN — ALLOPURINOL 50 MILLIGRAM(S): 100 TABLET ORAL at 11:22

## 2024-12-27 RX ADMIN — Medication 1 APPLICATION(S): at 06:24

## 2024-12-27 RX ADMIN — Medication 1 MILLIGRAM(S): at 11:22

## 2024-12-27 RX ADMIN — URSODIOL 500 MILLIGRAM(S): 250 TABLET, FILM COATED ORAL at 05:53

## 2024-12-27 NOTE — PROGRESS NOTE ADULT - SUBJECTIVE AND OBJECTIVE BOX
Follow Up:  fever    Interval History/ROS:    afebrile  s/p exchange transfusion 12/25     blood culture 12/26 gnr      Allergies  penicillin (Pruritus)  hydroxyurea (Other)  ceftriaxone (Anaphylaxis)  piperacillin-tazobactam (Urticaria)        ANTIMICROBIALS:  ertapenem  IVPB 1000 every 24 hours    MEDICATIONS  (STANDING):  allopurinol 50 daily  enoxaparin Injectable 40 every 24 hours  HYDROmorphone PCA (5 mG/mL) 30 PCA Continuous  influenza   Vaccine 0.5 once  NIFEdipine XL 30 daily  ondansetron Injectable 4 every 8 hours PRN  pantoprazole    Tablet 40 before breakfast  polyethylene glycol 3350 17 two times a day  senna 2 at bedtime  ursodiol Tablet 500 two times a day      Vital Signs Last 24 Hrs  T(F): 97.8 (12-27-24 @ 10:00), Max: 98.2 (12-27-24 @ 06:06)  HR: 75 (12-27-24 @ 10:00)  BP: 122/79 (12-27-24 @ 10:00)  RR: 18 (12-27-24 @ 10:00)  SpO2: 98% (12-27-24 @ 10:00) (97% - 99%)    Physical Exam:  General:    non toxic alert   Respiratory:    no respiratory distress RA  abd:     soft,    no tenderness  :  no  daniel  Musculoskeletal:   no joint swelling  vascular: R chest port with no tenderness or erythema with access   Skin:    no rash                                   8.7    18.11 )-----------( 302      ( 27 Dec 2024 05:38 )             25.1 12-27    135  |  101  |  18  ----------------------------<  93  4.0   |  23  |  0.90  Ca    8.5      27 Dec 2024 05:38Phos  3.8     12-27Mg     1.70     12-27  TPro  6.0  /  Alb  2.9  /  TBili  7.2  /  DBili  x   /  AST  78  /  ALT  43  /  AlkPhos  145  12-27          MICROBIOLOGY:    Culture - Blood (12.26.24 @ 13:15)   Gram Stain:   Growth in anaerobic bottle: Gram Negative Rods  Specimen Source: .Blood BLOOD  Culture Results:   Growth in anaerobic bottle: Gram Negative Rods    Culture - Blood (12.22.24 @ 13:30)   Gram Stain:   Growth in anaerobic bottle: Gram Negative Rods  Specimen Source: .Blood BLOOD  Culture Results:   Growth in anaerobic bottle: Juan Luisa terria   See previous culture [id number]    Culture - Blood (12.22.24 @ 13:30)   Specimen Source: .Blood BLOOD  Culture Results:   No growth at 48 hours    .Blood BLOOD  12-20-24   Growth in aerobic and anaerobic bottles: Marieoea terria  See previous culture [id number]  --    Growth in aerobic bottle: Gram Negative Rods  Growth in anaerobic bottle: Gram Negative Rods  Culture - Blood (12.20.24 @ 05:39)   Gram Stain:   Growth in aerobic bottle: Gram Negative Rods   Growth in anaerobic bottle: Gram Negative Rods  - Amoxicillin/Clavulanic Acid: S <=8/4  - Ampicillin: S <=8 These ampicillin results predict results for amoxicillin  - Ampicillin/Sulbactam: S <=4/2  - Aztreonam: S <=4  - Cefazolin: R >16  - Cefepime: S <=2  - Cefotaxime: S <=2  - Cefoxitin: R >16  - Ceftazidime: S <=1  - Ceftriaxone: S <=1  - Cefuroxime: S <=4  - Ciprofloxacin: S <=0.25  - Gentamicin: S <=2  - Levofloxacin: S <=0.5  - Meropenem: S <=1  - Ertapenem: S <=0.5  - Piperacillin/Tazobactam: S <=8  - Tigecycline: S <=2  - Tobramycin: S <=2  - Trimethoprim/Sulfamethoxazole: S <=0.5/9.5  - Minocycline: S <=4  - Enterobacterales: Detec  Specimen Source: .Blood BLOOD  Organism: Blood Culture PCR  Organism: Gram Negative Rods  Culture Results:   Growth in aerobic and anaerobic bottles: Pantoea terria   Direct identification is available within approximately 3-5   hours either by Blood Panel Multiplexed PCR or Direct   MALDI-TOF. Details: [email or web address]  Organism Identification: Blood Culture PCR   Gram Negative Rods  Method Type: TAMMY  Method Type: PCR    .Blood BLOOD  12-20-24   Growth in aerobic and anaerobic bottles: Pantoea dispersa  Direct identification is available within approximately 3-5  hours either by Blood Panel Multiplexed PCR or Direct  MALDI-TOF. Details: [email or web address]  --  Blood Culture PCR      .Blood BLOOD  12-17-24   No growth at 5 days  --  --      .Blood BLOOD  12-17-24   No growth at 5 days  --  --          Rapid RVP Result: NotDetec (12-18 @ 10:09)        RADIOLOGY:  Images independently visualized and reviewed personally, findings as below  < from: Xray Chest 1 View- PORTABLE-Urgent (Xray Chest 1 View- PORTABLE-Urgent .) (12.20.24 @ 05:37) >    IMPRESSION: No focal abnormality to account for fever.    < end of copied text >  < from: CT Angio Chest PE Protocol w/ IV Cont (12.09.24 @ 03:02) >  IMPRESSION:  No pulmonary embolus.    No acute fracture.    < end of copied text >     Follow Up:  fever    Interval History/ROS:    afebrile  s/p exchange transfusion 12/25 via femoral shiley    blood culture 12/26 gnr      Allergies  penicillin (Pruritus)  hydroxyurea (Other)  ceftriaxone (Anaphylaxis)  piperacillin-tazobactam (Urticaria)        ANTIMICROBIALS:  ertapenem  IVPB 1000 every 24 hours    MEDICATIONS  (STANDING):  allopurinol 50 daily  enoxaparin Injectable 40 every 24 hours  HYDROmorphone PCA (5 mG/mL) 30 PCA Continuous  influenza   Vaccine 0.5 once  NIFEdipine XL 30 daily  ondansetron Injectable 4 every 8 hours PRN  pantoprazole    Tablet 40 before breakfast  polyethylene glycol 3350 17 two times a day  senna 2 at bedtime  ursodiol Tablet 500 two times a day      Vital Signs Last 24 Hrs  T(F): 97.8 (12-27-24 @ 10:00), Max: 98.2 (12-27-24 @ 06:06)  HR: 75 (12-27-24 @ 10:00)  BP: 122/79 (12-27-24 @ 10:00)  RR: 18 (12-27-24 @ 10:00)  SpO2: 98% (12-27-24 @ 10:00) (97% - 99%)    Physical Exam:  General:    non toxic alert   Respiratory:    no respiratory distress RA  abd:     soft,    no tenderness  :  no  daniel  Musculoskeletal:   no joint swelling  vascular: R chest port with no tenderness or erythema with access in place  iv right femoral   Skin:    no rash                                   8.7    18.11 )-----------( 302      ( 27 Dec 2024 05:38 )             25.1 12-27    135  |  101  |  18  ----------------------------<  93  4.0   |  23  |  0.90  Ca    8.5      27 Dec 2024 05:38Phos  3.8     12-27Mg     1.70     12-27  TPro  6.0  /  Alb  2.9  /  TBili  7.2  /  DBili  x   /  AST  78  /  ALT  43  /  AlkPhos  145  12-27          MICROBIOLOGY:    Culture - Blood (12.26.24 @ 13:15)   Gram Stain:   Growth in anaerobic bottle: Gram Negative Rods  Specimen Source: .Blood BLOOD  Culture Results:   Growth in anaerobic bottle: Gram Negative Rods    Culture - Blood (12.22.24 @ 13:30)   Gram Stain:   Growth in anaerobic bottle: Gram Negative Rods  Specimen Source: .Blood BLOOD  Culture Results:   Growth in anaerobic bottle: Thao murraya   See previous culture [id number]    Culture - Blood (12.22.24 @ 13:30)   Specimen Source: .Blood BLOOD  Culture Results:   No growth at 48 hours    .Blood BLOOD  12-20-24   Growth in aerobic and anaerobic bottles: Marieoea terria  See previous culture [id number]  --    Growth in aerobic bottle: Gram Negative Rods  Growth in anaerobic bottle: Gram Negative Rods  Culture - Blood (12.20.24 @ 05:39)   Gram Stain:   Growth in aerobic bottle: Gram Negative Rods   Growth in anaerobic bottle: Gram Negative Rods  - Amoxicillin/Clavulanic Acid: S <=8/4  - Ampicillin: S <=8 These ampicillin results predict results for amoxicillin  - Ampicillin/Sulbactam: S <=4/2  - Aztreonam: S <=4  - Cefazolin: R >16  - Cefepime: S <=2  - Cefotaxime: S <=2  - Cefoxitin: R >16  - Ceftazidime: S <=1  - Ceftriaxone: S <=1  - Cefuroxime: S <=4  - Ciprofloxacin: S <=0.25  - Gentamicin: S <=2  - Levofloxacin: S <=0.5  - Meropenem: S <=1  - Ertapenem: S <=0.5  - Piperacillin/Tazobactam: S <=8  - Tigecycline: S <=2  - Tobramycin: S <=2  - Trimethoprim/Sulfamethoxazole: S <=0.5/9.5  - Minocycline: S <=4  - Enterobacterales: Detec  Specimen Source: .Blood BLOOD  Organism: Blood Culture PCR  Organism: Gram Negative Rods  Culture Results:   Growth in aerobic and anaerobic bottles: Marieoea terria   Direct identification is available within approximately 3-5   hours either by Blood Panel Multiplexed PCR or Direct   MALDI-TOF. Details: [email or web address]  Organism Identification: Blood Culture PCR   Gram Negative Rods  Method Type: TAMMY  Method Type: PCR    .Blood BLOOD  12-20-24   Growth in aerobic and anaerobic bottles: Pantoea dispersa  Direct identification is available within approximately 3-5  hours either by Blood Panel Multiplexed PCR or Direct  MALDI-TOF. Details: [email or web address]  --  Blood Culture PCR      .Blood BLOOD  12-17-24   No growth at 5 days  --  --      .Blood BLOOD  12-17-24   No growth at 5 days  --  --          Rapid RVP Result: Mitatec (12-18 @ 10:09)        RADIOLOGY:  Images independently visualized and reviewed personally, findings as below  < from: Xray Chest 1 View- PORTABLE-Urgent (Xray Chest 1 View- PORTABLE-Urgent .) (12.20.24 @ 05:37) >    IMPRESSION: No focal abnormality to account for fever.    < end of copied text >  < from: CT Angio Chest PE Protocol w/ IV Cont (12.09.24 @ 03:02) >  IMPRESSION:  No pulmonary embolus.    No acute fracture.    < end of copied text >

## 2024-12-27 NOTE — PHYSICAL THERAPY INITIAL EVALUATION ADULT - ADDITIONAL COMMENTS
Pt reports that he lives in a private house with his cousin in the basement with a flight of stairs to negotiate; (+)1 handrail; his brother and sister live on the 2nd floor. Prior to hospital admission, pt was completely independent and used no assistive device with ambulation. Pt had one recent fall, mechanical; no other falls aside from that one.    Pt left comfortable seated at edge of bed, NAD, all lines intact, all precautions maintained, with call bell in reach, and RN aware.

## 2024-12-27 NOTE — PROGRESS NOTE ADULT - SUBJECTIVE AND OBJECTIVE BOX
Gastroenterology/Hepatology Progress Note      Interval Events:   - No acute events overnight. No fevers or chills. Patient denied abd pain, nausea and vomiting.   - Bilirubin has been downtrending.   - Blood cultures positive for 12/26. Currently on Ertapenem.   - Tolerating po diet well.     Allergies:  penicillin (Pruritus)  hydroxyurea (Other)  ceftriaxone (Anaphylaxis)  piperacillin-tazobactam (Urticaria)      Hospital Medications:  allopurinol 50 milliGRAM(s) Oral daily  calcium gluconate IVPB 2 Gram(s) IV Intermittent once  chlorhexidine 2% Cloths 1 Application(s) Topical daily  enoxaparin Injectable 40 milliGRAM(s) SubCutaneous every 24 hours  ertapenem  IVPB 1000 milliGRAM(s) IV Intermittent every 24 hours  folic acid 1 milliGRAM(s) Oral every 24 hours  HYDROmorphone PCA (5 mG/mL) 30 milliLiter(s) PCA Continuous PCA Continuous  influenza   Vaccine 0.5 milliLiter(s) IntraMuscular once  mupirocin 2% Nasal 1 Application(s) Both Nostrils two times a day  naloxone Injectable 0.1 milliGRAM(s) IV Push once PRN  NIFEdipine XL 30 milliGRAM(s) Oral daily  ondansetron Injectable 4 milliGRAM(s) IV Push every 8 hours PRN  pantoprazole    Tablet 40 milliGRAM(s) Oral before breakfast  polyethylene glycol 3350 17 Gram(s) Oral two times a day  senna 2 Tablet(s) Oral at bedtime  sodium chloride 0.45%. 1000 milliLiter(s) IV Continuous <Continuous>  ursodiol Tablet 500 milliGRAM(s) Oral two times a day      ROS: 14 point ROS negative unless otherwise state in subjective    PHYSICAL EXAM:   Vital Signs:  Vital Signs Last 24 Hrs  T(C): 36.8 (27 Dec 2024 06:06), Max: 36.8 (27 Dec 2024 06:06)  T(F): 98.2 (27 Dec 2024 06:06), Max: 98.2 (27 Dec 2024 06:06)  HR: 74 (27 Dec 2024 06:06) (74 - 110)  BP: 139/90 (27 Dec 2024 06:06) (122/72 - 140/85)  BP(mean): --  RR: 18 (27 Dec 2024 06:06) (17 - 18)  SpO2: 98% (27 Dec 2024 06:06) (95% - 99%)    Parameters below as of 27 Dec 2024 06:06  Patient On (Oxygen Delivery Method): room air      Daily     Daily     PHYSICAL EXAM:     GENERAL:  No acute distress, young male, sitting on the bed, jaundice appearing.   HEENT:  NCAT, + scleral icterus.   CHEST:  NAD.   HEART:  Regular rate and rhythm  ABDOMEN:  Soft, non-tender, non-distended,   EXTREMITIES: Trace pitting edema in LE b/l.   SKIN:  No rash/erythema/ecchymoses/petechiae/wounds/abscess/warm/dry  NEURO:  Alert and oriented x 3, no asterixis    LABS:                        8.7    18.11 )-----------( 302      ( 27 Dec 2024 05:38 )             25.1     Mean Cell Volume: 84.5 fL (12-27-24 @ 05:38)    12-27    135  |  101  |  18  ----------------------------<  93  4.0   |  23  |  0.90    Ca    8.5      27 Dec 2024 05:38  Phos  3.8     12-27  Mg     1.70     12-27    TPro  6.0  /  Alb  2.9[L]  /  TBili  7.2[H]  /  DBili  x   /  AST  78[H]  /  ALT  43[H]  /  AlkPhos  145[H]  12-27    LIVER FUNCTIONS - ( 27 Dec 2024 05:38 )  Alb: 2.9 g/dL / Pro: 6.0 g/dL / ALK PHOS: 145 U/L / ALT: 43 U/L / AST: 78 U/L / GGT: x           PT/INR - ( 27 Dec 2024 05:38 )   PT: 13.6 sec;   INR: 1.17 ratio         PTT - ( 27 Dec 2024 05:38 )  PTT:32.0 sec  Urinalysis Basic - ( 27 Dec 2024 05:38 )    Color: x / Appearance: x / SG: x / pH: x  Gluc: 93 mg/dL / Ketone: x  / Bili: x / Urobili: x   Blood: x / Protein: x / Nitrite: x   Leuk Esterase: x / RBC: x / WBC x   Sq Epi: x / Non Sq Epi: x / Bacteria: x

## 2024-12-27 NOTE — PHYSICAL THERAPY INITIAL EVALUATION ADULT - PATIENT PROFILE REVIEW, REHAB EVAL
ACTIVITY ORDER: Ambulate with Assistance; Spoke with RN Mando Moreno prior to PT evaluation-->Pt OK for PT consult/OOB activity./yes

## 2024-12-27 NOTE — PROVIDER CONTACT NOTE (CRITICAL VALUE NOTIFICATION) - NAME OF MD/NP/PA/DO NOTIFIED:
RENNY Edmondson
Hong
RENNY Bhagat
ACP Isaak Grover
Jorden Pham
Karyn Zavala
Jaymie Jaramillo via TEAMS
KALEB Boss
Marcel Young
khai chaney acp
Jorden Pham
Patrizia Quintanilla
ACP Yodit See
Delmi Ann
Patrizia Quintanilla
RENNY Barry
Jorden Pham
RENNY Bhagat
RENNY Bhagat

## 2024-12-27 NOTE — PROGRESS NOTE ADULT - ASSESSMENT
44 m with sickle cell disease with a port and previous staph epi bacteremia  admitted 12/9 after mechanical fall.  noted to be anemic; received a blood transfusion 12/13 and 12/18  CTA no PE  Fever 101 on 12/17  WBC increased   blood cultures 12/17 negative  fever gain to 102.3 on 12/20  blood cx 12/20 with pantoa    meropenem 12/20-12/24  ertapenem 12/24-    sickle cell with port and previous staph epi bacteremia now admitted with fall and then crisis    new fever in the hospital; pantoea bacteremia 12/20    hospital acquired bacteremia    repeat blood cultures 12/22 no growth 1/2; pantoea in other after 4 days     12/26 blood culture growing gnr      h/o penicillin allergy    with persistently positive blood cultures concern for seeding port         Suggest:    follow final identification of 12/26 blood culture     continue  ertapenem 1 gm iv q 24 h     remove port then check blood culture to assess for clearance of bacteremia       ID service available over weekend      44 m with sickle cell disease with a port and previous staph epi bacteremia  admitted 12/9 after mechanical fall.  noted to be anemic; received a blood transfusion 12/13 and 12/18  CTA no PE  Fever 101 on 12/17  WBC increased   blood cultures 12/17 negative  fever gain to 102.3 on 12/20  blood cx 12/20 with pantoa    meropenem 12/20-12/24  ertapenem 12/24-    sickle cell with port and previous staph epi bacteremia now admitted with fall and then crisis    new fever in the hospital; pantoea bacteremia 12/20    hospital acquired bacteremia    repeat blood cultures 12/22 no growth 1/2; pantoea in other after 4 days     12/26 blood culture growing gnr      h/o penicillin allergy    with persistently positive blood cultures concern for seeding port         Suggest:    follow final identification of 12/26 blood culture; if pantoea remove port then check blood culture to assess for clearance of bacteremia     continue  ertapenem 1 gm iv q 24 h     remove shiley if no additional exchange transfusion planned       ID service available over weekend

## 2024-12-27 NOTE — PHYSICAL THERAPY INITIAL EVALUATION ADULT - GENERAL OBSERVATIONS, REHAB EVAL
Pt encountered standing in room, no distress, AxOx4, with +IV, +cardiac monitor, and +pulse oximeter. Pt agreeable to participate in PT evaluation. heart rate 91bpm.

## 2024-12-27 NOTE — PHYSICAL THERAPY INITIAL EVALUATION ADULT - PERTINENT HX OF CURRENT PROBLEM, REHAB EVAL
Patient is a 44M with a PMH of sickle cell, prior CTAs for acute chest pain who presents to the ED for dyspnea. CTA (-)PE.

## 2024-12-27 NOTE — PROGRESS NOTE ADULT - SUBJECTIVE AND OBJECTIVE BOX
Patient is a 44y old  Male who presents with a chief complaint of mech fall, pain, dyspnea (26 Dec 2024 12:59)  Patient seen today, feeling much better  Tbili 7.2, Hgb 8.7, afebrile      MEDICATIONS  (STANDING):  allopurinol 50 milliGRAM(s) Oral daily  calcium gluconate IVPB 2 Gram(s) IV Intermittent once  chlorhexidine 2% Cloths 1 Application(s) Topical daily  enoxaparin Injectable 40 milliGRAM(s) SubCutaneous every 24 hours  ertapenem  IVPB 1000 milliGRAM(s) IV Intermittent every 24 hours  folic acid 1 milliGRAM(s) Oral every 24 hours  HYDROmorphone PCA (5 mG/mL) 30 milliLiter(s) PCA Continuous PCA Continuous  influenza   Vaccine 0.5 milliLiter(s) IntraMuscular once  mupirocin 2% Nasal 1 Application(s) Both Nostrils two times a day  NIFEdipine XL 30 milliGRAM(s) Oral daily  pantoprazole    Tablet 40 milliGRAM(s) Oral before breakfast  polyethylene glycol 3350 17 Gram(s) Oral two times a day  senna 2 Tablet(s) Oral at bedtime  sodium chloride 0.45%. 1000 milliLiter(s) (30 mL/Hr) IV Continuous <Continuous>  ursodiol Tablet 500 milliGRAM(s) Oral two times a day    MEDICATIONS  (PRN):  naloxone Injectable 0.1 milliGRAM(s) IV Push once PRN Opioid Overdose  ondansetron Injectable 4 milliGRAM(s) IV Push every 8 hours PRN Nausea and/or Vomiting      Vital Signs Last 24 Hrs  T(C): 36.8 (27 Dec 2024 06:06), Max: 36.8 (27 Dec 2024 06:06)  T(F): 98.2 (27 Dec 2024 06:06), Max: 98.2 (27 Dec 2024 06:06)  HR: 74 (27 Dec 2024 06:06) (74 - 110)  BP: 139/90 (27 Dec 2024 06:06) (122/72 - 140/85)  BP(mean): --  RR: 18 (27 Dec 2024 06:06) (17 - 18)  SpO2: 98% (27 Dec 2024 06:06) (95% - 99%)    Parameters below as of 27 Dec 2024 06:06  Patient On (Oxygen Delivery Method): room air        PE  NAD  Awake, alert  Anicteric, MMM  RRR  CTAB  Abd soft, NT, ND  No c/c/e  No rash grossly                            8.7    18.11 )-----------( 302      ( 27 Dec 2024 05:38 )             25.1       12-27    135  |  101  |  18  ----------------------------<  93  4.0   |  23  |  0.90    Ca    8.5      27 Dec 2024 05:38  Phos  3.8     12-27  Mg     1.70     12-27    TPro  6.0  /  Alb  2.9[L]  /  TBili  7.2[H]  /  DBili  x   /  AST  78[H]  /  ALT  43[H]  /  AlkPhos  145[H]  12-27

## 2024-12-27 NOTE — CHART NOTE - NSCHARTNOTEFT_GEN_A_CORE
Right femoral shiley removed without complication, manual pressure held for 10 min - hemostasis achieved, pt tolerated procedure well. Right femoral shiley removed without complication, manual pressure held for 10 min - hemostasis achieved, clean dressing applied, pt tolerated procedure well.

## 2024-12-27 NOTE — PROGRESS NOTE ADULT - SUBJECTIVE AND OBJECTIVE BOX
Division of Hospital Medicine  Ana Flowers MD  Available via MS Teams    SUBJECTIVE / OVERNIGHT EVENTS: No active issues overnight; pt denies any new symptoms, states pain is improving    MEDICATIONS  (STANDING):  allopurinol 50 milliGRAM(s) Oral daily  calcium gluconate IVPB 2 Gram(s) IV Intermittent once  chlorhexidine 2% Cloths 1 Application(s) Topical daily  enoxaparin Injectable 40 milliGRAM(s) SubCutaneous every 24 hours  ertapenem  IVPB 1000 milliGRAM(s) IV Intermittent every 24 hours  folic acid 1 milliGRAM(s) Oral every 24 hours  HYDROmorphone PCA (5 mG/mL) 30 milliLiter(s) PCA Continuous PCA Continuous  influenza   Vaccine 0.5 milliLiter(s) IntraMuscular once  mupirocin 2% Nasal 1 Application(s) Both Nostrils two times a day  NIFEdipine XL 30 milliGRAM(s) Oral daily  pantoprazole    Tablet 40 milliGRAM(s) Oral before breakfast  polyethylene glycol 3350 17 Gram(s) Oral two times a day  senna 2 Tablet(s) Oral at bedtime  sodium chloride 0.45%. 1000 milliLiter(s) (30 mL/Hr) IV Continuous <Continuous>  ursodiol Tablet 500 milliGRAM(s) Oral two times a day    MEDICATIONS  (PRN):  naloxone Injectable 0.1 milliGRAM(s) IV Push once PRN Opioid Overdose  ondansetron Injectable 4 milliGRAM(s) IV Push every 8 hours PRN Nausea and/or Vomiting      I&O's Summary    26 Dec 2024 07:01  -  27 Dec 2024 07:00  --------------------------------------------------------  IN: 0 mL / OUT: 1000 mL / NET: -1000 mL        PHYSICAL EXAM:  Vital Signs Last 24 Hrs  T(C): 36.6 (27 Dec 2024 10:00), Max: 36.8 (27 Dec 2024 06:06)  T(F): 97.8 (27 Dec 2024 10:00), Max: 98.2 (27 Dec 2024 06:06)  HR: 75 (27 Dec 2024 10:00) (74 - 110)  BP: 122/79 (27 Dec 2024 10:00) (122/79 - 140/85)  BP(mean): --  RR: 18 (27 Dec 2024 10:00) (17 - 18)  SpO2: 98% (27 Dec 2024 10:00) (97% - 99%)    Parameters below as of 27 Dec 2024 10:00  Patient On (Oxygen Delivery Method): room air      CONSTITUTIONAL: NAD  EYES: PERRLA; conjunctiva and sclera clear  ENMT: Moist oral mucosa, no pharyngeal injection or exudates; normal dentition  NECK: Supple, no palpable masses; no thyromegaly  RESPIRATORY: Normal respiratory effort; lungs are clear to auscultation bilaterally; no rales, rhonchi, or wheezing  CARDIOVASCULAR: Regular rate and rhythm, normal S1 and S2, no murmur/rub/gallop; Peripheral pulses are 2+ bilaterally  ABDOMEN: Nontender to palpation, normoactive bowel sounds, no rebound/guarding; No hepatosplenomegaly  MUSCULOSKELETAL: no clubbing or cyanosis of digits, LE edema+  NEUROLOGY: Awake and alert to person, place, and date; no focal neurological deficits   SKIN: No rashes; no palpable lesions    LABS:                        8.7    18.11 )-----------( 302      ( 27 Dec 2024 05:38 )             25.1     12-27    135  |  101  |  18  ----------------------------<  93  4.0   |  23  |  0.90    Ca    8.5      27 Dec 2024 05:38  Phos  3.8     12-27  Mg     1.70     12-27    TPro  6.0  /  Alb  2.9[L]  /  TBili  7.2[H]  /  DBili  x   /  AST  78[H]  /  ALT  43[H]  /  AlkPhos  145[H]  12-27    PT/INR - ( 27 Dec 2024 05:38 )   PT: 13.6 sec;   INR: 1.17 ratio         PTT - ( 27 Dec 2024 05:38 )  PTT:32.0 sec      Urinalysis Basic - ( 27 Dec 2024 05:38 )    Color: x / Appearance: x / SG: x / pH: x  Gluc: 93 mg/dL / Ketone: x  / Bili: x / Urobili: x   Blood: x / Protein: x / Nitrite: x   Leuk Esterase: x / RBC: x / WBC x   Sq Epi: x / Non Sq Epi: x / Bacteria: x        Culture - Blood (collected 26 Dec 2024 13:15)  Source: .Blood BLOOD  Gram Stain (27 Dec 2024 08:21):    Growth in anaerobic bottle: Gram Negative Rods  Preliminary Report (27 Dec 2024 08:21):    Growth in anaerobic bottle: Gram Negative Rods      SARS-CoV-2: NotDetec (18 Dec 2024 10:09)      Imaging and consultant notes reviewed.

## 2024-12-27 NOTE — PROGRESS NOTE ADULT - PROBLEM SELECTOR PLAN 8
DVT prop: lovenox. Elevated risk of DVT given SCC. No signs of bleeding.   low Na diet    Dispo: Home pending ID clearance (on IV Abx) and once pain is better controlled and pt is off Dilaudid PCA  - PT eval: no skilled PT needs

## 2024-12-27 NOTE — PROVIDER CONTACT NOTE (CRITICAL VALUE NOTIFICATION) - SITUATION
Lab notified RN of patient HGB 5.5
Pt hemoglobin 6.0 and hematocrit 17.5 on AM labs.
Critical Lab results - Blood culture drawn on 12/26/24. Growth in anaerobic bottle: gram negative josué.
Hgb 5.4, HCT 14.9
call received from lab hgb 6.0 hct 16.8
critical value of hgb 5.7,  hct 16.4
Blood Culture Growth in Aerobic Bottle: Gram (-) rods, Growth in anerobic bottle: Gram (-) rods
hemoglobin 5.8, hematocrit 16.6
patient with critical value: blood culture from 12/22/24, growth in anaerobic bottle is gram negative rods
Patient Hgb 5.7. Hct 16
Pt hemoglobin 6.5 and hematocrit 18.4 on AM labs.
Blood Culture Growth in Aerobic Bottle: Gram (-) rods
Hemoglobin 5.8
Hemoglobin 6, Hematocrit 16.7
HgB 5.6; Hct: 16.1
hgb 4.9 and HCT 14.5
hgb 6.4 and HCT 17.5
Patient's morning lab results came back, hgb was 6.5 and hct was 18.4.
LAB reported pt's hgb 6.1, hct 17

## 2024-12-27 NOTE — PROGRESS NOTE ADULT - PROBLEM SELECTOR PLAN 2
Pt spiking intermittent fever, new CXR showed opacities ( neg CXR x 3 in the past few days), high suspicion for acute chest,   Patient reports hgb baseline is ~ 5.5  - Discussed with Mayuri, s/p exhange transfusion 12/26 given high suspicion for acute chest syndrome and liver involvement; f/u Hgb Electrophoresis shows HbS: 10.6% (goal <30%); s/p port study 12/26 (port intact, continue to use)  - C/w dilaudid PCA, monitor pain, will gradually wean pt off PCA; pt takes Oxycodone 30 mg PRN at home  - Bowel regimen with Senna daily, miralax prn  - Patient follows Dr Jordan of [facility] - appreciate hem/onc input, s/p 2 unit pRBC this admission. Trend Hgb.   - Pt on Jadenu at home, discussed with Mayuri onc, hold at this time, plan to resume at time of discharge.

## 2024-12-27 NOTE — CHART NOTE - NSCHARTNOTEFT_GEN_A_CORE
44M with a PMH of sickle cell, prior CTAs for acute chest who is admitted to medicine after mechanical fall, dyspnea and sickle cell crisis. Urology consulted for incidentally found Left renal pelvic mass seen MRCP; patient seen by urology team on 12/24 w/ recommendation made for outpatient follow up.   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 Filling defects in   the left renal collecting system as above, suspicious for urothelial   neoplasm. Recommend urology consultation.    Nonspecific upper abdominal and retroperitoneal lymphadenopathy, without   significant change.LYMPH NODES: Upper abdominal lymphadenopathy without significant change.   For example, peripancreatic node (2, 41) 3.5 x 1.7 cm, previously 3.4 x   1.7 cm. 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   On the right, the ureters merge distally. On the left, the ureters merge   at the midureter. The proximal left lower ureter is not opacified.    There is a 1.9 x 1.2 cm enhancing filling defect in the left renal   interpolar collecting system (3, 48). Additional small filling defect in   the left lower pole (11, 69). Bilateral renal cysts. No hydronephrosis or   renal calculi.    < end of copied text >    --Recommendation for outpatient follow up at [facility] for left renal pelvic mass.   --No further recommendations.

## 2024-12-27 NOTE — PROGRESS NOTE ADULT - PROBLEM SELECTOR PLAN 1
Fever to 101.2 at 21:35 on 12/17/24. Reported to have O2 sat in 80s With leukocytosis. focal infectious symptoms. Recent staph epi bacteremia during November admission here, seen by ID at that time. Patient has port in place,   - blood culture with Enterobacterales and pantoea, ID reconsulted, rec to switch meropenem to ertapenem 1 gm q24h (12/24 - ), f/u blood cultures from 12/26 still showing GNR, repeat blood Cx on 12/28  - ?Pantoea from the rolling paper/marijuana he uses could be a source.  - F/u with ID regarding duration of Abx

## 2024-12-27 NOTE — PROVIDER CONTACT NOTE (CRITICAL VALUE NOTIFICATION) - NS PROVIDER READ BACK
no
no
notified via TEAMS/no
yes
notified via TEAMS/no
no
Provider has not yet to respond/no
TEXT PAGED
yes
TEXT PAGED
yes

## 2024-12-27 NOTE — PROGRESS NOTE ADULT - ASSESSMENT
Impression:   44 year old male with hx sickle cell disease w/ multiple admissions for sickle cell crisis/acute chest syndrome and dx of cirrhosis 2/2 secondary iron overload who presented w/ dyspnea, fall, and acute pain. Hospital course c/w fevers 2/2 bacteremia 2/2 Pantoa Dispersa currently on meropenem w/ ID following, MATA w/ improving renal function, LE edema and severe pain currently on PCA Dilaudid, currently has progressive hyperbilirubinemia, so hepatology was consulted.     #Hyperbilirubinemia 2/2 acute hepatic sickle cell crisis and sepsis of cholestasis  #Compensated cirrhosis 2/2 secondary iron overload due to multiple blood transfusions  Calculated MELD 3 score on 12/27 - 18A. Reported he was dx with cirrhosis 11/2024, does not see hepatologist, was informed by his hematologist. No prior liver biopsy, based on imaging. Patient has normal platelets and INR but has chronic elevation of bilirubin around 5-7. During this admission, bilirubin has been progressively increasing to 13, likely multifactorial. Differentials include acute hepatic sickle cell crisis, sepsis of cholestasis in the setting of underlying cirrhosis. MRCP showed no biliary obstruction. No prior testing for HFE mutation completed, its non HFE related HH. Would be useful to obtain the iron content in hepatocytes with either a liver biopsy or MRI. Patient does have underlying chronic liver disease due to secondary iron overload from multiple blood transfusions and possible chronic cholestasis related to sickling process.   - Ascites: None seen on MRCP imaging  - Varices: no prior EGD report.   - HE: AAOx3, not on medications.   - HCC: No lesions seen on MRCP.   Previously had US doppler in 10/2024 which was normal. Also had CT A/P which showed ascites but no cirrhosis  - Iron studies: Iron 428, Iron sat 79%, ferritin 3000, hemoglobin S 21%.   - Other lab serologies: hep B surface Ag neg, hep B DNA neg, hep C neg. OSMANY and ASA neg  - Patient received RBC exchange on 12/25, now bilirubin has been downtrending. Hemoglobin S 7.7% at this time.     #Bacteremia  - Blood cultures positive from 12/20 - Pantoa Dispera. ID consulted and started on ertapenem. Repeat blood cultures 1/2 bottles positive for gram neg rods.   - Persistent positive blood cultures from 12/26.     Recommendations:   - Bilirubin elevation is multifactorial w/ acute hepatic sickle cell crisis, sepsis of cholestasis in the setting of underlying cirrhosis due to secondary iron overload. Now the bilirubin has been trending down. Will need to continue w/ Abx for infection.   - continue with ursodiol 500mg BID.  - Patient will eventually need MRI of the liver to detect iron measurements, R2-MRI as an o/p. High risk for liver biopsy which could lead to hemorrhage.   - Trend CMP, CBC and PT/INR daily.   - Patient will need to follow up with hepatology in 1-2 weeks after hospital discharge.     Discussed the case with Dr. Malloy.     Thank you for the consult. Please call us back if you have any questions or concerns.     All recommendations are tentative until the note is attested by an attending.     Eamon Lemon, PGY-6  Gastroenterology/Hepatology Fellow  Available on Microsoft Teams  [phone] (Short Range Pager)  [phone] (Long Range Pager)    After 5pm, please contact the on-call GI fellow.

## 2024-12-27 NOTE — PROVIDER CONTACT NOTE (CRITICAL VALUE NOTIFICATION) - TEST AND RESULT REPORTED:
Hgb 5  HGB 5.7, Hct 16
hgb 5.5
hgb 6.1, hct 17
Blood culture
hgb 6.0 hct 16.8
hGB 6.5 AND hCT 18.4
hemoglobin 5.8, hematocrit 16.6
Hemoglobin 6, Hematocrit 16.7
HgB 5.6; Hct: 16.1
lood culture from 12/22/24, growth in anaerobic bottle is gram negative rods
Hgb - 6.0, hematocrit - 17.5
Hgb - 6.5, hematocrit - 18.4
hgb 5.7,  hct 16.4
hgb 6.4 and HCT 17.5
Hemoglobin 5.8
Hgb 5.4, HCT 14.9
Blood Culture Growth in Aerobic Bottle: Gram (-) rods
Blood Culture Growth in Aerobic Bottle: Gram (-) rods, Growth in anerobic bottle: Gram (-) rods
hgb 4.9 and HCT 14.5

## 2024-12-27 NOTE — PROGRESS NOTE ADULT - ASSESSMENT
44 year old male with history of sickle cell anemia presenting today s/p mechanical fall    Sickle cell Disease - Pain Crisis   - Follows with Dr Jordan of [facility]  - s/p 1u PRBCs 12/18/24   - Chronic hemolysis, iron overload; trying to limit transfusions - Hgb goal > 6.0  - Continue Jadenu  - Maximize pain control; currently on Dilaudid PCA pump    Gram negative bacteremia  - Source unclear but noted to have rising DIRECT bilirubin   - Repeat cultures negative, remains on antibiotics, ID following    Rising D-bili   - s/p MRCP which showed no obstruction, but underlying iron overload  - s/p exchange transfusion for 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; goal Hb S < 30%, Hgb 8.7, feels better  - Hepatology consulted, appreciate recs  - Hb S levels 7.7, Tbili improved to 7.2  -Medicine team appreciated    Hypoxia   - Mild and intermittent; previously walked and had no desaturation on ambulation and there is no evidence of infiltrate on chest x-rays or CT and no chest pain, most recent CXR without evidence of PNA but likely atelectasis - had not been using incentive spirometer - advised to use throughout the day; denies any chest pain       Meghana Real NP  Hematology/Oncology  [facility]  [phone] (Office)  [phone] (Alt office)  Evenings and weekends please call MD on call or office

## 2024-12-28 LAB
ALBUMIN SERPL ELPH-MCNC: 3.1 G/DL — LOW (ref 3.3–5)
ALP SERPL-CCNC: 144 U/L — HIGH (ref 40–120)
ALT FLD-CCNC: 45 U/L — HIGH (ref 4–41)
ANION GAP SERPL CALC-SCNC: 13 MMOL/L — SIGNIFICANT CHANGE UP (ref 7–14)
AST SERPL-CCNC: 83 U/L — HIGH (ref 4–40)
BASOPHILS # BLD AUTO: 0.09 K/UL — SIGNIFICANT CHANGE UP (ref 0–0.2)
BASOPHILS NFR BLD AUTO: 0.9 % — SIGNIFICANT CHANGE UP (ref 0–2)
BILIRUB SERPL-MCNC: 5.8 MG/DL — HIGH (ref 0.2–1.2)
BUN SERPL-MCNC: 16 MG/DL — SIGNIFICANT CHANGE UP (ref 7–23)
CALCIUM SERPL-MCNC: 8.8 MG/DL — SIGNIFICANT CHANGE UP (ref 8.4–10.5)
CHLORIDE SERPL-SCNC: 102 MMOL/L — SIGNIFICANT CHANGE UP (ref 98–107)
CO2 SERPL-SCNC: 20 MMOL/L — LOW (ref 22–31)
CREAT SERPL-MCNC: 0.79 MG/DL — SIGNIFICANT CHANGE UP (ref 0.5–1.3)
CULTURE RESULTS: ABNORMAL
EGFR: 112 ML/MIN/1.73M2 — SIGNIFICANT CHANGE UP
EOSINOPHIL # BLD AUTO: 0.37 K/UL — SIGNIFICANT CHANGE UP (ref 0–0.5)
EOSINOPHIL NFR BLD AUTO: 3.9 % — SIGNIFICANT CHANGE UP (ref 0–6)
GLUCOSE SERPL-MCNC: 93 MG/DL — SIGNIFICANT CHANGE UP (ref 70–99)
HAPTOGLOB SERPL-MCNC: <20 MG/DL — LOW (ref 34–200)
HCT VFR BLD CALC: 27.9 % — LOW (ref 39–50)
HGB BLD-MCNC: 9.8 G/DL — LOW (ref 13–17)
IANC: 5.55 K/UL — SIGNIFICANT CHANGE UP (ref 1.8–7.4)
IMM GRANULOCYTES NFR BLD AUTO: 0.6 % — SIGNIFICANT CHANGE UP (ref 0–0.9)
INR BLD: 1.06 RATIO — SIGNIFICANT CHANGE UP (ref 0.85–1.16)
LDH SERPL L TO P-CCNC: 404 U/L — HIGH (ref 135–225)
LYMPHOCYTES # BLD AUTO: 2.6 K/UL — SIGNIFICANT CHANGE UP (ref 1–3.3)
LYMPHOCYTES # BLD AUTO: 27.1 % — SIGNIFICANT CHANGE UP (ref 13–44)
MAGNESIUM SERPL-MCNC: 1.8 MG/DL — SIGNIFICANT CHANGE UP (ref 1.6–2.6)
MCHC RBC-ENTMCNC: 29.8 PG — SIGNIFICANT CHANGE UP (ref 27–34)
MCHC RBC-ENTMCNC: 35.1 G/DL — SIGNIFICANT CHANGE UP (ref 32–36)
MCV RBC AUTO: 84.8 FL — SIGNIFICANT CHANGE UP (ref 80–100)
MONOCYTES # BLD AUTO: 0.92 K/UL — HIGH (ref 0–0.9)
MONOCYTES NFR BLD AUTO: 9.6 % — SIGNIFICANT CHANGE UP (ref 2–14)
NEUTROPHILS # BLD AUTO: 5.55 K/UL — SIGNIFICANT CHANGE UP (ref 1.8–7.4)
NEUTROPHILS NFR BLD AUTO: 57.9 % — SIGNIFICANT CHANGE UP (ref 43–77)
NRBC # BLD: 0 /100 WBCS — SIGNIFICANT CHANGE UP (ref 0–0)
NRBC # FLD: 0.05 K/UL — HIGH (ref 0–0)
PHOSPHATE SERPL-MCNC: 4.1 MG/DL — SIGNIFICANT CHANGE UP (ref 2.5–4.5)
PLATELET # BLD AUTO: 336 K/UL — SIGNIFICANT CHANGE UP (ref 150–400)
POTASSIUM SERPL-MCNC: 4.2 MMOL/L — SIGNIFICANT CHANGE UP (ref 3.5–5.3)
POTASSIUM SERPL-SCNC: 4.2 MMOL/L — SIGNIFICANT CHANGE UP (ref 3.5–5.3)
PROT SERPL-MCNC: 6.5 G/DL — SIGNIFICANT CHANGE UP (ref 6–8.3)
PROTHROM AB SERPL-ACNC: 12.3 SEC — SIGNIFICANT CHANGE UP (ref 9.9–13.4)
RBC # BLD: 3.29 M/UL — LOW (ref 4.2–5.8)
RBC # BLD: 3.29 M/UL — LOW (ref 4.2–5.8)
RBC # FLD: 18.5 % — HIGH (ref 10.3–14.5)
RETICS #: 322.4 K/UL — HIGH (ref 25–125)
RETICS/RBC NFR: 9.8 % — HIGH (ref 0.5–2.5)
SODIUM SERPL-SCNC: 135 MMOL/L — SIGNIFICANT CHANGE UP (ref 135–145)
SPECIMEN SOURCE: SIGNIFICANT CHANGE UP
WBC # BLD: 9.59 K/UL — SIGNIFICANT CHANGE UP (ref 3.8–10.5)
WBC # FLD AUTO: 9.59 K/UL — SIGNIFICANT CHANGE UP (ref 3.8–10.5)

## 2024-12-28 PROCEDURE — 99233 SBSQ HOSP IP/OBS HIGH 50: CPT

## 2024-12-28 RX ADMIN — Medication 1 APPLICATION(S): at 17:08

## 2024-12-28 RX ADMIN — Medication 1 APPLICATION(S): at 05:39

## 2024-12-28 RX ADMIN — ALLOPURINOL 50 MILLIGRAM(S): 100 TABLET ORAL at 12:11

## 2024-12-28 RX ADMIN — Medication 17 GRAM(S): at 17:06

## 2024-12-28 RX ADMIN — ERTAPENEM SODIUM 120 MILLIGRAM(S): 1 INJECTION, POWDER, LYOPHILIZED, FOR SOLUTION INTRAMUSCULAR; INTRAVENOUS at 22:21

## 2024-12-28 RX ADMIN — Medication 30 MILLILITER(S): at 20:39

## 2024-12-28 RX ADMIN — CHLORHEXIDINE GLUCONATE 1 APPLICATION(S): 1.2 RINSE ORAL at 12:13

## 2024-12-28 RX ADMIN — PANTOPRAZOLE 40 MILLIGRAM(S): 40 TABLET, DELAYED RELEASE ORAL at 05:38

## 2024-12-28 RX ADMIN — ENOXAPARIN SODIUM 40 MILLIGRAM(S): 60 INJECTION INTRAVENOUS; SUBCUTANEOUS at 17:06

## 2024-12-28 RX ADMIN — SENNOSIDES 2 TABLET(S): 8.6 TABLET, FILM COATED ORAL at 22:23

## 2024-12-28 RX ADMIN — Medication 1 MILLIGRAM(S): at 12:11

## 2024-12-28 RX ADMIN — Medication 17 GRAM(S): at 05:37

## 2024-12-28 RX ADMIN — NIFEDIPINE 30 MILLIGRAM(S): 60 TABLET, EXTENDED RELEASE ORAL at 05:38

## 2024-12-28 RX ADMIN — URSODIOL 500 MILLIGRAM(S): 250 TABLET, FILM COATED ORAL at 05:37

## 2024-12-28 RX ADMIN — URSODIOL 500 MILLIGRAM(S): 250 TABLET, FILM COATED ORAL at 17:06

## 2024-12-28 RX ADMIN — Medication 30 MILLILITER(S): at 08:32

## 2024-12-28 NOTE — PROGRESS NOTE ADULT - PROBLEM SELECTOR PLAN 1
Fever to 101.2 at 21:35 on 12/17/24. Reported to have O2 sat in 80s With leukocytosis. focal infectious symptoms. Recent staph epi bacteremia during November admission here, seen by ID at that time. Patient has port in place,   - blood culture with Enterobacterales and pantoea, ID reconsulted, rec to switch meropenem (12/20-12/24) to ertapenem 1 gm q24h (12/24 - ), f/u blood cultures from 12/26 still showing GNR, repeat blood Cx on 12/28  - F/u ID recs on 12/27: follow final identification of 12/26 blood culture; if pantoea, remove port then check blood culture to assess for clearance of bacteremia; continue ertapenem 1 gm iv q 24 h

## 2024-12-28 NOTE — PROGRESS NOTE ADULT - SUBJECTIVE AND OBJECTIVE BOX
Patient is a 44y old  Male who presents with a chief complaint of mech fall, pain, dyspnea (26 Dec 2024 12:59)  Patient seen today, feeling much better, no overnight events    MEDICATIONS  (STANDING):  allopurinol 50 milliGRAM(s) Oral daily  calcium gluconate IVPB 2 Gram(s) IV Intermittent once  chlorhexidine 2% Cloths 1 Application(s) Topical daily  enoxaparin Injectable 40 milliGRAM(s) SubCutaneous every 24 hours  ertapenem  IVPB 1000 milliGRAM(s) IV Intermittent every 24 hours  folic acid 1 milliGRAM(s) Oral every 24 hours  HYDROmorphone PCA (5 mG/mL) 30 milliLiter(s) PCA Continuous PCA Continuous  influenza   Vaccine 0.5 milliLiter(s) IntraMuscular once  mupirocin 2% Nasal 1 Application(s) Both Nostrils two times a day  NIFEdipine XL 30 milliGRAM(s) Oral daily  pantoprazole    Tablet 40 milliGRAM(s) Oral before breakfast  polyethylene glycol 3350 17 Gram(s) Oral two times a day  senna 2 Tablet(s) Oral at bedtime  sodium chloride 0.45%. 1000 milliLiter(s) (30 mL/Hr) IV Continuous <Continuous>  ursodiol Tablet 500 milliGRAM(s) Oral two times a day    MEDICATIONS  (PRN):  naloxone Injectable 0.1 milliGRAM(s) IV Push once PRN Opioid Overdose  ondansetron Injectable 4 milliGRAM(s) IV Push every 8 hours PRN Nausea and/or Vomiting      Vital Signs Last 24 Hrs  T(C): 36.2 (28 Dec 2024 05:35), Max: 36.7 (27 Dec 2024 14:00)  T(F): 97.2 (28 Dec 2024 05:35), Max: 98 (27 Dec 2024 14:00)  HR: 72 (28 Dec 2024 05:35) (70 - 80)  BP: 124/82 (28 Dec 2024 05:35) (110/72 - 130/75)  BP(mean): --  RR: 18 (28 Dec 2024 05:35) (17 - 18)  SpO2: 99% (28 Dec 2024 05:35) (98% - 100%)    Parameters below as of 28 Dec 2024 05:35  Patient On (Oxygen Delivery Method): room air        PE  NAD  Awake, alert  Anicteric, MMM  RRR  CTAB  Abd soft, NT, ND  No c/c/e  No rash grossly                                 9.8    9.59  )-----------( 336      ( 28 Dec 2024 05:45 )             27.9   12-28    135  |  102  |  16  ----------------------------<  93  4.2   |  20[L]  |  0.79    Ca    8.8      28 Dec 2024 05:45  Phos  4.1     12-28  Mg     1.80     12-28    TPro  6.5  /  Alb  3.1[L]  /  TBili  5.8[H]  /  DBili  x   /  AST  83[H]  /  ALT  45[H]  /  AlkPhos  144[H]  12-28

## 2024-12-28 NOTE — PROGRESS NOTE ADULT - ASSESSMENT
44 year old male with history of sickle cell anemia presenting today s/p mechanical fall    Sickle cell Disease - Pain Crisis   - Follows with Dr Jordan of [facility]  - s/p 1u PRBCs 12/18/24   - Chronic hemolysis, iron overload; trying to limit transfusions - Hgb goal > 6.0  - Continue Jadenu  - Maximize pain control; currently on Dilaudid PCA pump    Gram negative bacteremia  - Source unclear but noted to have rising DIRECT bilirubin   - Repeat cultures negative, remains on antibiotics, ID following    Rising D-bili   - s/p MRCP which showed no obstruction, but underlying iron overload  - s/p exchange transfusion for 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

## 2024-12-28 NOTE — PROGRESS NOTE ADULT - PROBLEM SELECTOR PLAN 2
Pt spiking intermittent fever, new CXR showed opacities ( neg CXR x 3 in the past few days), high suspicion for acute chest,   Patient reports hgb baseline is ~ 5.5  - Discussed with Mayuri, s/p exhange transfusion 12/26 given high suspicion for acute chest syndrome and liver involvement; f/u Hgb Electrophoresis shows HbS: 10.6% (goal <30%); s/p port study 12/26 (port intact, continue to use)  - C/w dilaudid PCA, monitor pain, will gradually wean pt off PCA; pt takes Oxycodone 30 mg PRN at home  - Bowel regimen with Senna daily, miralax prn  - Patient follows Dr Jordan of [facility] - appreciate hem/onc input, s/p 2 unit pRBC this admission. Trend Hgb.   - Pt on Jadenu at home, discussed with Mayuri onc, hold at this time, plan to resume at time of discharge.

## 2024-12-28 NOTE — PROGRESS NOTE ADULT - PROBLEM SELECTOR PLAN 8
DVT prop: lovenox. Elevated risk of DVT given SCC. No signs of bleeding.   low Na diet    Dispo: Home pending ID clearance (on IV Abx) and once pain is better controlled and pt is off Dilaudid PCA pump  - PT eval: no skilled PT needs

## 2024-12-28 NOTE — PROGRESS NOTE ADULT - SUBJECTIVE AND OBJECTIVE BOX
Division of Hospital Medicine  Ana Flowers MD  Available via MS Teams    SUBJECTIVE / OVERNIGHT EVENTS: No active issues overnight; pt denies any new symptoms    MEDICATIONS  (STANDING):  allopurinol 50 milliGRAM(s) Oral daily  calcium gluconate IVPB 2 Gram(s) IV Intermittent once  chlorhexidine 2% Cloths 1 Application(s) Topical daily  enoxaparin Injectable 40 milliGRAM(s) SubCutaneous every 24 hours  ertapenem  IVPB 1000 milliGRAM(s) IV Intermittent every 24 hours  folic acid 1 milliGRAM(s) Oral every 24 hours  HYDROmorphone PCA (5 mG/mL) 30 milliLiter(s) PCA Continuous PCA Continuous  influenza   Vaccine 0.5 milliLiter(s) IntraMuscular once  mupirocin 2% Nasal 1 Application(s) Both Nostrils two times a day  NIFEdipine XL 30 milliGRAM(s) Oral daily  pantoprazole    Tablet 40 milliGRAM(s) Oral before breakfast  polyethylene glycol 3350 17 Gram(s) Oral two times a day  senna 2 Tablet(s) Oral at bedtime  sodium chloride 0.45%. 1000 milliLiter(s) (30 mL/Hr) IV Continuous <Continuous>  ursodiol Tablet 500 milliGRAM(s) Oral two times a day    MEDICATIONS  (PRN):  naloxone Injectable 0.1 milliGRAM(s) IV Push once PRN Opioid Overdose  ondansetron Injectable 4 milliGRAM(s) IV Push every 8 hours PRN Nausea and/or Vomiting      I&O's Summary    27 Dec 2024 07:01  -  28 Dec 2024 07:00  --------------------------------------------------------  IN: 0 mL / OUT: 1550 mL / NET: -1550 mL        PHYSICAL EXAM:  Vital Signs Last 24 Hrs  T(C): 36.2 (28 Dec 2024 05:35), Max: 36.7 (27 Dec 2024 14:00)  T(F): 97.2 (28 Dec 2024 05:35), Max: 98 (27 Dec 2024 14:00)  HR: 72 (28 Dec 2024 05:35) (70 - 80)  BP: 124/82 (28 Dec 2024 05:35) (110/72 - 130/75)  BP(mean): --  RR: 18 (28 Dec 2024 05:35) (17 - 18)  SpO2: 99% (28 Dec 2024 05:35) (98% - 100%)    Parameters below as of 28 Dec 2024 05:35  Patient On (Oxygen Delivery Method): room air      CONSTITUTIONAL: NAD  EYES: PERRLA; conjunctiva and sclera clear  ENMT: Moist oral mucosa, no pharyngeal injection or exudates; normal dentition  NECK: Supple, no palpable masses; no thyromegaly  RESPIRATORY: Normal respiratory effort; lungs are clear to auscultation bilaterally; no rales, rhonchi, or wheezing  CARDIOVASCULAR: Regular rate and rhythm, normal S1 and S2, no murmur/rub/gallop; Peripheral pulses are 2+ bilaterally  ABDOMEN: Nontender to palpation, normoactive bowel sounds, no rebound/guarding; No hepatosplenomegaly  MUSCULOSKELETAL: no clubbing or cyanosis of digits  NEUROLOGY: Awake and alert to person, place, and date; no focal neurological deficits   SKIN: No rashes; no palpable lesions    LABS:                        9.8    9.59  )-----------( 336      ( 28 Dec 2024 05:45 )             27.9     12-28    135  |  102  |  16  ----------------------------<  93  4.2   |  20[L]  |  0.79    Ca    8.8      28 Dec 2024 05:45  Phos  4.1     12-28  Mg     1.80     12-28    TPro  6.5  /  Alb  3.1[L]  /  TBili  5.8[H]  /  DBili  x   /  AST  83[H]  /  ALT  45[H]  /  AlkPhos  144[H]  12-28    PT/INR - ( 28 Dec 2024 05:45 )   PT: 12.3 sec;   INR: 1.06 ratio         PTT - ( 27 Dec 2024 05:38 )  PTT:32.0 sec      Urinalysis Basic - ( 28 Dec 2024 05:45 )    Color: x / Appearance: x / SG: x / pH: x  Gluc: 93 mg/dL / Ketone: x  / Bili: x / Urobili: x   Blood: x / Protein: x / Nitrite: x   Leuk Esterase: x / RBC: x / WBC x   Sq Epi: x / Non Sq Epi: x / Bacteria: x        Culture - Blood (collected 26 Dec 2024 13:15)  Source: .Blood BLOOD  Gram Stain (27 Dec 2024 08:21):    Growth in anaerobic bottle: Gram Negative Rods  Preliminary Report (27 Dec 2024 08:21):    Growth in anaerobic bottle: Gram Negative Rods    Culture - Blood (collected 26 Dec 2024 13:00)  Source: .Blood BLOOD  Preliminary Report (27 Dec 2024 17:01):    No growth at 24 hours      SARS-CoV-2: NotDetec (18 Dec 2024 10:09)      Imaging and consultant notes reviewed.

## 2024-12-29 LAB
ALBUMIN SERPL ELPH-MCNC: 2.9 G/DL — LOW (ref 3.3–5)
ALP SERPL-CCNC: 132 U/L — HIGH (ref 40–120)
ALT FLD-CCNC: 32 U/L — SIGNIFICANT CHANGE UP (ref 4–41)
ANION GAP SERPL CALC-SCNC: 10 MMOL/L — SIGNIFICANT CHANGE UP (ref 7–14)
AST SERPL-CCNC: 54 U/L — HIGH (ref 4–40)
BASOPHILS # BLD AUTO: 0.07 K/UL — SIGNIFICANT CHANGE UP (ref 0–0.2)
BASOPHILS NFR BLD AUTO: 0.7 % — SIGNIFICANT CHANGE UP (ref 0–2)
BILIRUB SERPL-MCNC: 4.4 MG/DL — HIGH (ref 0.2–1.2)
BUN SERPL-MCNC: 16 MG/DL — SIGNIFICANT CHANGE UP (ref 7–23)
CALCIUM SERPL-MCNC: 8.5 MG/DL — SIGNIFICANT CHANGE UP (ref 8.4–10.5)
CHLORIDE SERPL-SCNC: 106 MMOL/L — SIGNIFICANT CHANGE UP (ref 98–107)
CO2 SERPL-SCNC: 21 MMOL/L — LOW (ref 22–31)
CREAT SERPL-MCNC: 0.8 MG/DL — SIGNIFICANT CHANGE UP (ref 0.5–1.3)
EGFR: 111 ML/MIN/1.73M2 — SIGNIFICANT CHANGE UP
EOSINOPHIL # BLD AUTO: 0.36 K/UL — SIGNIFICANT CHANGE UP (ref 0–0.5)
EOSINOPHIL NFR BLD AUTO: 3.7 % — SIGNIFICANT CHANGE UP (ref 0–6)
GLUCOSE SERPL-MCNC: 108 MG/DL — HIGH (ref 70–99)
HAPTOGLOB SERPL-MCNC: <20 MG/DL — LOW (ref 34–200)
HCT VFR BLD CALC: 23.4 % — LOW (ref 39–50)
HGB BLD-MCNC: 8.2 G/DL — LOW (ref 13–17)
IANC: 5.3 K/UL — SIGNIFICANT CHANGE UP (ref 1.8–7.4)
IMM GRANULOCYTES NFR BLD AUTO: 0.6 % — SIGNIFICANT CHANGE UP (ref 0–0.9)
INR BLD: 1.01 RATIO — SIGNIFICANT CHANGE UP (ref 0.85–1.16)
LDH SERPL L TO P-CCNC: 298 U/L — HIGH (ref 135–225)
LYMPHOCYTES # BLD AUTO: 2.71 K/UL — SIGNIFICANT CHANGE UP (ref 1–3.3)
LYMPHOCYTES # BLD AUTO: 28.1 % — SIGNIFICANT CHANGE UP (ref 13–44)
MAGNESIUM SERPL-MCNC: 1.7 MG/DL — SIGNIFICANT CHANGE UP (ref 1.6–2.6)
MCHC RBC-ENTMCNC: 29.5 PG — SIGNIFICANT CHANGE UP (ref 27–34)
MCHC RBC-ENTMCNC: 35 G/DL — SIGNIFICANT CHANGE UP (ref 32–36)
MCV RBC AUTO: 84.2 FL — SIGNIFICANT CHANGE UP (ref 80–100)
MONOCYTES # BLD AUTO: 1.13 K/UL — HIGH (ref 0–0.9)
MONOCYTES NFR BLD AUTO: 11.7 % — SIGNIFICANT CHANGE UP (ref 2–14)
NEUTROPHILS # BLD AUTO: 5.3 K/UL — SIGNIFICANT CHANGE UP (ref 1.8–7.4)
NEUTROPHILS NFR BLD AUTO: 55.2 % — SIGNIFICANT CHANGE UP (ref 43–77)
NRBC # BLD: 0 /100 WBCS — SIGNIFICANT CHANGE UP (ref 0–0)
NRBC # FLD: 0.02 K/UL — HIGH (ref 0–0)
PHOSPHATE SERPL-MCNC: 3.6 MG/DL — SIGNIFICANT CHANGE UP (ref 2.5–4.5)
PLATELET # BLD AUTO: 319 K/UL — SIGNIFICANT CHANGE UP (ref 150–400)
POTASSIUM SERPL-MCNC: 4.3 MMOL/L — SIGNIFICANT CHANGE UP (ref 3.5–5.3)
POTASSIUM SERPL-SCNC: 4.3 MMOL/L — SIGNIFICANT CHANGE UP (ref 3.5–5.3)
PROT SERPL-MCNC: 6.3 G/DL — SIGNIFICANT CHANGE UP (ref 6–8.3)
PROTHROM AB SERPL-ACNC: 12 SEC — SIGNIFICANT CHANGE UP (ref 9.9–13.4)
RBC # BLD: 2.78 M/UL — LOW (ref 4.2–5.8)
RBC # BLD: 2.78 M/UL — LOW (ref 4.2–5.8)
RBC # FLD: 18 % — HIGH (ref 10.3–14.5)
RETICS #: 195.7 K/UL — HIGH (ref 25–125)
RETICS/RBC NFR: 7 % — HIGH (ref 0.5–2.5)
SODIUM SERPL-SCNC: 137 MMOL/L — SIGNIFICANT CHANGE UP (ref 135–145)
WBC # BLD: 9.63 K/UL — SIGNIFICANT CHANGE UP (ref 3.8–10.5)
WBC # FLD AUTO: 9.63 K/UL — SIGNIFICANT CHANGE UP (ref 3.8–10.5)

## 2024-12-29 PROCEDURE — 99233 SBSQ HOSP IP/OBS HIGH 50: CPT

## 2024-12-29 RX ADMIN — CHLORHEXIDINE GLUCONATE 1 APPLICATION(S): 1.2 RINSE ORAL at 11:14

## 2024-12-29 RX ADMIN — Medication 30 MILLILITER(S): at 19:28

## 2024-12-29 RX ADMIN — Medication 30 MILLILITER(S): at 17:41

## 2024-12-29 RX ADMIN — ERTAPENEM SODIUM 120 MILLIGRAM(S): 1 INJECTION, POWDER, LYOPHILIZED, FOR SOLUTION INTRAMUSCULAR; INTRAVENOUS at 20:38

## 2024-12-29 RX ADMIN — Medication 1 MILLIGRAM(S): at 11:31

## 2024-12-29 RX ADMIN — SODIUM CHLORIDE 30 MILLILITER(S): 9 INJECTION, SOLUTION INTRAVENOUS at 17:27

## 2024-12-29 RX ADMIN — SENNOSIDES 2 TABLET(S): 8.6 TABLET, FILM COATED ORAL at 21:11

## 2024-12-29 RX ADMIN — ENOXAPARIN SODIUM 40 MILLIGRAM(S): 60 INJECTION INTRAVENOUS; SUBCUTANEOUS at 17:27

## 2024-12-29 RX ADMIN — ACETAMINOPHEN 650 MILLIGRAM(S): 80 SOLUTION/ DROPS ORAL at 07:11

## 2024-12-29 RX ADMIN — URSODIOL 500 MILLIGRAM(S): 250 TABLET, FILM COATED ORAL at 06:17

## 2024-12-29 RX ADMIN — NIFEDIPINE 30 MILLIGRAM(S): 60 TABLET, EXTENDED RELEASE ORAL at 06:17

## 2024-12-29 RX ADMIN — Medication 30 MILLILITER(S): at 07:29

## 2024-12-29 RX ADMIN — ALLOPURINOL 50 MILLIGRAM(S): 100 TABLET ORAL at 11:14

## 2024-12-29 RX ADMIN — Medication 17 GRAM(S): at 17:27

## 2024-12-29 RX ADMIN — URSODIOL 500 MILLIGRAM(S): 250 TABLET, FILM COATED ORAL at 17:27

## 2024-12-29 RX ADMIN — Medication 1 APPLICATION(S): at 17:27

## 2024-12-29 RX ADMIN — PANTOPRAZOLE 40 MILLIGRAM(S): 40 TABLET, DELAYED RELEASE ORAL at 06:18

## 2024-12-29 NOTE — PROGRESS NOTE ADULT - SUBJECTIVE AND OBJECTIVE BOX
Division of Hospital Medicine  Ana Flowers MD  Available via MS Teams    SUBJECTIVE / OVERNIGHT EVENTS: No active issues overnight; pt denies any new symptoms    MEDICATIONS  (STANDING):  allopurinol 50 milliGRAM(s) Oral daily  calcium gluconate IVPB 2 Gram(s) IV Intermittent once  chlorhexidine 2% Cloths 1 Application(s) Topical daily  enoxaparin Injectable 40 milliGRAM(s) SubCutaneous every 24 hours  ertapenem  IVPB 1000 milliGRAM(s) IV Intermittent every 24 hours  folic acid 1 milliGRAM(s) Oral every 24 hours  HYDROmorphone PCA (5 mG/mL) 30 milliLiter(s) PCA Continuous PCA Continuous  influenza   Vaccine 0.5 milliLiter(s) IntraMuscular once  mupirocin 2% Nasal 1 Application(s) Both Nostrils two times a day  NIFEdipine XL 30 milliGRAM(s) Oral daily  pantoprazole    Tablet 40 milliGRAM(s) Oral before breakfast  polyethylene glycol 3350 17 Gram(s) Oral two times a day  senna 2 Tablet(s) Oral at bedtime  sodium chloride 0.45%. 1000 milliLiter(s) (30 mL/Hr) IV Continuous <Continuous>  ursodiol Tablet 500 milliGRAM(s) Oral two times a day    MEDICATIONS  (PRN):  naloxone Injectable 0.1 milliGRAM(s) IV Push once PRN Opioid Overdose  ondansetron Injectable 4 milliGRAM(s) IV Push every 8 hours PRN Nausea and/or Vomiting      I&O's Summary    28 Dec 2024 07:01  -  29 Dec 2024 07:00  --------------------------------------------------------  IN: 0 mL / OUT: 1750 mL / NET: -1750 mL    29 Dec 2024 07:01  -  29 Dec 2024 11:02  --------------------------------------------------------  IN: 300 mL / OUT: 400 mL / NET: -100 mL        PHYSICAL EXAM:  Vital Signs Last 24 Hrs  T(C): 36.6 (29 Dec 2024 09:05), Max: 36.8 (28 Dec 2024 17:30)  T(F): 97.8 (29 Dec 2024 09:05), Max: 98.3 (28 Dec 2024 20:55)  HR: 84 (29 Dec 2024 09:05) (71 - 86)  BP: 147/93 (29 Dec 2024 09:05) (114/75 - 147/93)  BP(mean): --  RR: 18 (29 Dec 2024 09:05) (16 - 20)  SpO2: 100% (29 Dec 2024 09:05) (99% - 100%)    Parameters below as of 29 Dec 2024 09:05  Patient On (Oxygen Delivery Method): room air      CONSTITUTIONAL: NAD  EYES: PERRLA; conjunctiva and sclera clear  ENMT: Moist oral mucosa, no pharyngeal injection or exudates; normal dentition  NECK: Supple, no palpable masses; no thyromegaly  RESPIRATORY: Normal respiratory effort; lungs are clear to auscultation bilaterally; no rales, rhonchi, or wheezing  CARDIOVASCULAR: Regular rate and rhythm, normal S1 and S2, no murmur/rub/gallop; Peripheral pulses are 2+ bilaterally  ABDOMEN: Nontender to palpation, normoactive bowel sounds, no rebound/guarding; No hepatosplenomegaly  MUSCULOSKELETAL: no clubbing or cyanosis of digits  NEUROLOGY: Awake and alert to person, place, and date; no focal neurological deficits   SKIN: No rashes; no palpable lesions    LABS:                        8.2    9.63  )-----------( 319      ( 29 Dec 2024 05:53 )             23.4     12-29    137  |  106  |  16  ----------------------------<  108[H]  4.3   |  21[L]  |  0.80    Ca    8.5      29 Dec 2024 05:53  Phos  3.6     12-29  Mg     1.70     12-29    TPro  6.3  /  Alb  2.9[L]  /  TBili  4.4[H]  /  DBili  x   /  AST  54[H]  /  ALT  32  /  AlkPhos  132[H]  12-29    PT/INR - ( 29 Dec 2024 05:53 )   PT: 12.0 sec;   INR: 1.01 ratio               Urinalysis Basic - ( 29 Dec 2024 05:53 )    Color: x / Appearance: x / SG: x / pH: x  Gluc: 108 mg/dL / Ketone: x  / Bili: x / Urobili: x   Blood: x / Protein: x / Nitrite: x   Leuk Esterase: x / RBC: x / WBC x   Sq Epi: x / Non Sq Epi: x / Bacteria: x        Culture - Blood (collected 28 Dec 2024 06:03)  Source: .Blood BLOOD  Preliminary Report (29 Dec 2024 10:01):    No growth at 24 hours    Culture - Blood (collected 28 Dec 2024 05:45)  Source: .Blood BLOOD  Preliminary Report (29 Dec 2024 09:01):    No growth at 24 hours    Culture - Blood (collected 26 Dec 2024 13:15)  Source: .Blood BLOOD  Gram Stain (27 Dec 2024 08:21):    Growth in anaerobic bottle: Gram Negative Rods  Final Report (28 Dec 2024 14:56):    Growth in anaerobic bottle: Pantoea dispersa    See previous culture [id number]    Culture - Blood (collected 26 Dec 2024 13:00)  Source: .Blood BLOOD  Preliminary Report (28 Dec 2024 17:01):    No growth at 48 Hours      SARS-CoV-2: NotDetec (18 Dec 2024 10:09)      Imaging and consultant notes reviewed.

## 2024-12-29 NOTE — PROGRESS NOTE ADULT - PROBLEM SELECTOR PLAN 2
Pt spiking intermittent fever, new CXR showed opacities ( neg CXR x 3 in the past few days), high suspicion for acute chest,   Patient reports hgb baseline is ~ 5.5  - Discussed with Mayuri, s/p exhange transfusion 12/26 given high suspicion for acute chest syndrome and liver involvement; f/u Hgb Electrophoresis shows HbS: 10.6% (goal <30%); s/p port study 12/26 (port intact, continue to use)  - C/w dilaudid PCA, monitor pain, will gradually wean pt off PCA; pt takes Oxycodone 30 mg PRN at home  - Bowel regimen with Senna daily, miralax prn  - Patient follows Dr Jordan of [facility] - appreciate hem/onc input, s/p 2 unit pRBC this admission. Trend Hgb.   - Pt on Jadenu at home, discussed with Mayuri onc, hold at this time, plan to resume at time of discharge.

## 2024-12-29 NOTE — CONSULT NOTE ADULT - CONSULT REASON
MATA
vaso occlusive crisis
hyperbilirubinemia
antibiotic recommendations
Mediport removal
Urgent need for exchange transfusion
Left renal pelvic mass
Shiley placement
chest port not able to be accessed despite cathflo

## 2024-12-29 NOTE — CHART NOTE - NSCHARTNOTEFT_GEN_A_CORE
Pre-Interventional Radiology Procedure Note    TOLU VARGAS | [id number]    12-29-24 @ 12:35    Interventional Radiology Attending Physician: Nadira    Ordering Attending Physician: Ana Flowers    Diagnosis/Indication: Patient is a 44y old  Male who presents with a chief complaint of mech fall, pain, dyspnea (29 Dec 2024 11:00)      Procedure: mediport removal    44y    Male    PAST MEDICAL & SURGICAL HISTORY:  Sickle cell anemia      Gout      History of cholecystectomy           CBC Full  -  ( 29 Dec 2024 05:53 )  WBC Count : 9.63 K/uL  RBC Count : 2.78 M/uL  Hemoglobin : 8.2 g/dL  Hematocrit : 23.4 %  Platelet Count - Automated : 319 K/uL  Mean Cell Volume : 84.2 fL  Mean Cell Hemoglobin : 29.5 pg  Mean Cell Hemoglobin Concentration : 35.0 g/dL  Auto Neutrophil # : 5.30 K/uL  Auto Lymphocyte # : 2.71 K/uL  Auto Monocyte # : 1.13 K/uL  Auto Eosinophil # : 0.36 K/uL  Auto Basophil # : 0.07 K/uL  Auto Neutrophil % : 55.2 %  Auto Lymphocyte % : 28.1 %  Auto Monocyte % : 11.7 %  Auto Eosinophil % : 3.7 %  Auto Basophil % : 0.7 %    12-29    137  |  106  |  16  ----------------------------<  108[H]  4.3   |  21[L]  |  0.80    Ca    8.5      29 Dec 2024 05:53  Phos  3.6     12-29  Mg     1.70     12-29    TPro  6.3  /  Alb  2.9[L]  /  TBili  4.4[H]  /  DBili  x   /  AST  54[H]  /  ALT  32  /  AlkPhos  132[H]  12-29    PT/INR - ( 29 Dec 2024 05:53 )   PT: 12.0 sec;   INR: 1.01 ratio

## 2024-12-29 NOTE — PROGRESS NOTE ADULT - PROBLEM SELECTOR PLAN 8
DVT prop: lovenox. Elevated risk of DVT given SCC. No signs of bleeding.   low Na diet    Dispo: Home pending mediport removal, ID clearance (on IV Abx), and once pt is off Dilaudid PCA pump  - PT eval: no skilled PT needs

## 2024-12-29 NOTE — PROGRESS NOTE ADULT - ASSESSMENT
44 year old male with history of sickle cell anemia presenting today s/p mechanical fall    Sickle cell Disease - Pain Crisis   - Follows with Dr Jordan of [facility]  - s/p 1u PRBCs 12/18/24   - Chronic hemolysis, iron overload; trying to limit transfusions - Hgb goal > 6.0  - Continue Jadenu  - Maximize pain control; currently on Dilaudid PCA pump    Gram negative bacteremia  - Source unclear but noted to have rising DIRECT bilirubin   - Repeat cultures negative, remains on antibiotics, ID following    Rising D-bili   - s/p MRCP which showed no obstruction, but underlying iron overload  - s/p exchange transfusion for 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

## 2024-12-29 NOTE — PROGRESS NOTE ADULT - PROBLEM SELECTOR PLAN 1
Fever to 101.2 at 21:35 on 12/17/24. Reported to have O2 sat in 80s With leukocytosis. focal infectious symptoms. Recent staph epi bacteremia during November admission here, seen by ID at that time. Patient has port in place,   - blood culture with Enterobacterales and pantoea, ID reconsulted, rec to switch meropenem (12/20-12/24) to ertapenem 1 gm q24h (12/24 - ), f/u blood cultures from 12/26 still showing pantoea, f/u repeat blood Cx on 12/28  - F/u ID recs on 12/27: follow final identification of 12/26 blood culture; if pantoea, remove port then check blood culture to assess for clearance of bacteremia; continue ertapenem 1 gm iv q 24 h  - IR consulted today - plan to perform mediport removal in AM on Monday 12/30

## 2024-12-29 NOTE — PROGRESS NOTE ADULT - SUBJECTIVE AND OBJECTIVE BOX
Patient is a 44y old  Male who presents with a chief complaint of mech fall, pain, dyspnea (26 Dec 2024 12:59)  Patient seen today, feeling much better, no overnight events    MEDICATIONS  (STANDING):  allopurinol 50 milliGRAM(s) Oral daily  calcium gluconate IVPB 2 Gram(s) IV Intermittent once  chlorhexidine 2% Cloths 1 Application(s) Topical daily  enoxaparin Injectable 40 milliGRAM(s) SubCutaneous every 24 hours  ertapenem  IVPB 1000 milliGRAM(s) IV Intermittent every 24 hours  folic acid 1 milliGRAM(s) Oral every 24 hours  HYDROmorphone PCA (5 mG/mL) 30 milliLiter(s) PCA Continuous PCA Continuous  influenza   Vaccine 0.5 milliLiter(s) IntraMuscular once  mupirocin 2% Nasal 1 Application(s) Both Nostrils two times a day  NIFEdipine XL 30 milliGRAM(s) Oral daily  pantoprazole    Tablet 40 milliGRAM(s) Oral before breakfast  polyethylene glycol 3350 17 Gram(s) Oral two times a day  senna 2 Tablet(s) Oral at bedtime  sodium chloride 0.45%. 1000 milliLiter(s) (30 mL/Hr) IV Continuous <Continuous>  ursodiol Tablet 500 milliGRAM(s) Oral two times a day    MEDICATIONS  (PRN):  naloxone Injectable 0.1 milliGRAM(s) IV Push once PRN Opioid Overdose  ondansetron Injectable 4 milliGRAM(s) IV Push every 8 hours PRN Nausea and/or Vomiting    Vital Signs Last 24 Hrs  T(C): 36.6 (29 Dec 2024 09:05), Max: 36.8 (28 Dec 2024 17:30)  T(F): 97.8 (29 Dec 2024 09:05), Max: 98.3 (28 Dec 2024 20:55)  HR: 84 (29 Dec 2024 09:05) (71 - 86)  BP: 147/93 (29 Dec 2024 09:05) (114/75 - 147/93)  BP(mean): --  RR: 18 (29 Dec 2024 09:05) (16 - 20)  SpO2: 100% (29 Dec 2024 09:05) (99% - 100%)    Parameters below as of 29 Dec 2024 09:05  Patient On (Oxygen Delivery Method): room air      PE  NAD  Awake, alert  Anicteric, MMM  RRR  CTAB  Abd soft, NT, ND  No c/c/e  No rash grossly                                         8.2    9.63  )-----------( 319      ( 29 Dec 2024 05:53 )             23.4   12-29    137  |  106  |  16  ----------------------------<  108[H]  4.3   |  21[L]  |  0.80    Ca    8.5      29 Dec 2024 05:53  Phos  3.6     12-29  Mg     1.70     12-29    TPro  6.3  /  Alb  2.9[L]  /  TBili  4.4[H]  /  DBili  x   /  AST  54[H]  /  ALT  32  /  AlkPhos  132[H]  12-29

## 2024-12-29 NOTE — CONSULT NOTE ADULT - CONSULT REQUESTED DATE/TIME
24-Dec-2024 14:22
24-Dec-2024 14:36
26-Dec-2024 13:01
29-Dec-2024 10:07
09-Dec-2024 11:28
24-Dec-2024 16:23
24-Dec-2024 17:00
18-Dec-2024 13:55
18-Dec-2024 14:07

## 2024-12-29 NOTE — CONSULT NOTE ADULT - REASON FOR ADMISSION
mech fall, pain, dyspnea

## 2024-12-29 NOTE — CONSULT NOTE ADULT - PROVIDER SPECIALTY LIST ADULT
Intervent Radiology
Nephrology
Urology
Hepatology
Infectious Disease
MICU
Intervent Radiology
Vascular Surgery
Heme/Onc

## 2024-12-29 NOTE — CONSULT NOTE ADULT - ASSESSMENT
Interventional Radiology    Evaluate for Procedure: Mediport removal    HPI: 44 year old male with hx sickle cell, prior acute chest, coming in with dyspnea and acute pain, acute chest syndrome. Patient also found to have gram negative bacteremia with cultures positive for Pantoea. Of note, patient had port placed 10 years ago in OSH and is s/p port study on 12/26 which revealed functioning port. Given positive cultures, ID recommends mediport removal.     Allergies: penicillin (Pruritus)  hydroxyurea (Other)  ceftriaxone (Anaphylaxis)  piperacillin-tazobactam (Urticaria)    Medications (Abx/Cardiac/Anticoagulation/Blood Products)    enoxaparin Injectable: 40 milliGRAM(s) SubCutaneous (12-28 @ 17:06)  ertapenem  IVPB: 120 mL/Hr IV Intermittent (12-28 @ 22:21)  NIFEdipine XL: 30 milliGRAM(s) Oral (12-29 @ 06:17)    Data:    T(C): 36.6  HR: 84  BP: 147/93  RR: 18  SpO2: 100%    -WBC 9.63 / HgB 8.2 / Hct 23.4 / Plt 319  -Na 137 / Cl 106 / BUN 16 / Glucose 108  -K 4.3 / CO2 21 / Cr 0.80  -ALT 32 / Alk Phos 132 / T.Bili 4.4  -INR 1.01 / PTT --      Radiology: reviewed    Assessment/Plan: 44 year old male with hx sickle cell, prior acute chest, coming in with dyspnea and acute pain, acute chest syndrome. Patient also found to have gram negative bacteremia with cultures positive for Pantoea. Of note, patient had port placed 10 years ago in OSH and is s/p port study on 12/26 which revealed functioning port. Given positive cultures, ID recommends mediport removal.     -- Case and images reviewed with IR Attending Dr. Waddell  -- IR will plan to perform mediport removal on Monday 12/30  -- NPO at midnight prior to procedure  -- hold a.m. anticoagulation  -- routine am labs  -- please place IR pre-procedure note  -- please place IR procedure request order under Dr. Waddell    --  Tariq Mazariegos MD PGY-4  Interventional Radiology  IR Pager: [phone]  Available on Teams     - Non-emergent consults: Place IR consult order in [place]  - Emergent issues (pager): [facility] [phone]; [facility] [phone]; [phone]  - Scheduling questions: [facility] [phone]; [facility] [phone]  - Clinic/outpatient booking: [facility] [phone]; [facility] [phone]

## 2024-12-30 LAB
ALBUMIN SERPL ELPH-MCNC: 3.2 G/DL — LOW (ref 3.3–5)
ALP SERPL-CCNC: 140 U/L — HIGH (ref 40–120)
ALT FLD-CCNC: 31 U/L — SIGNIFICANT CHANGE UP (ref 4–41)
ANION GAP SERPL CALC-SCNC: 12 MMOL/L — SIGNIFICANT CHANGE UP (ref 7–14)
AST SERPL-CCNC: 52 U/L — HIGH (ref 4–40)
BASOPHILS # BLD AUTO: 0.08 K/UL — SIGNIFICANT CHANGE UP (ref 0–0.2)
BASOPHILS NFR BLD AUTO: 0.8 % — SIGNIFICANT CHANGE UP (ref 0–2)
BILIRUB SERPL-MCNC: 5 MG/DL — HIGH (ref 0.2–1.2)
BLD GP AB SCN SERPL QL: NEGATIVE — SIGNIFICANT CHANGE UP
BUN SERPL-MCNC: 17 MG/DL — SIGNIFICANT CHANGE UP (ref 7–23)
CALCIUM SERPL-MCNC: 8.7 MG/DL — SIGNIFICANT CHANGE UP (ref 8.4–10.5)
CHLORIDE SERPL-SCNC: 100 MMOL/L — SIGNIFICANT CHANGE UP (ref 98–107)
CO2 SERPL-SCNC: 20 MMOL/L — LOW (ref 22–31)
CREAT SERPL-MCNC: 0.79 MG/DL — SIGNIFICANT CHANGE UP (ref 0.5–1.3)
EGFR: 112 ML/MIN/1.73M2 — SIGNIFICANT CHANGE UP
EOSINOPHIL # BLD AUTO: 0.18 K/UL — SIGNIFICANT CHANGE UP (ref 0–0.5)
EOSINOPHIL NFR BLD AUTO: 1.7 % — SIGNIFICANT CHANGE UP (ref 0–6)
GLUCOSE SERPL-MCNC: 92 MG/DL — SIGNIFICANT CHANGE UP (ref 70–99)
HAPTOGLOB SERPL-MCNC: <20 MG/DL — LOW (ref 34–200)
HCT VFR BLD CALC: 24.8 % — LOW (ref 39–50)
HGB BLD-MCNC: 8.5 G/DL — LOW (ref 13–17)
IANC: 7.03 K/UL — SIGNIFICANT CHANGE UP (ref 1.8–7.4)
IMM GRANULOCYTES NFR BLD AUTO: 0.7 % — SIGNIFICANT CHANGE UP (ref 0–0.9)
INR BLD: 1.04 RATIO — SIGNIFICANT CHANGE UP (ref 0.85–1.16)
LDH SERPL L TO P-CCNC: 318 U/L — HIGH (ref 135–225)
LYMPHOCYTES # BLD AUTO: 19.8 % — SIGNIFICANT CHANGE UP (ref 13–44)
LYMPHOCYTES # BLD AUTO: 2.1 K/UL — SIGNIFICANT CHANGE UP (ref 1–3.3)
MAGNESIUM SERPL-MCNC: 1.7 MG/DL — SIGNIFICANT CHANGE UP (ref 1.6–2.6)
MCHC RBC-ENTMCNC: 29.8 PG — SIGNIFICANT CHANGE UP (ref 27–34)
MCHC RBC-ENTMCNC: 34.3 G/DL — SIGNIFICANT CHANGE UP (ref 32–36)
MCV RBC AUTO: 87 FL — SIGNIFICANT CHANGE UP (ref 80–100)
MONOCYTES # BLD AUTO: 1.17 K/UL — HIGH (ref 0–0.9)
MONOCYTES NFR BLD AUTO: 11 % — SIGNIFICANT CHANGE UP (ref 2–14)
NEUTROPHILS # BLD AUTO: 7.03 K/UL — SIGNIFICANT CHANGE UP (ref 1.8–7.4)
NEUTROPHILS NFR BLD AUTO: 66 % — SIGNIFICANT CHANGE UP (ref 43–77)
NRBC # BLD: 0 /100 WBCS — SIGNIFICANT CHANGE UP (ref 0–0)
NRBC # FLD: 0.02 K/UL — HIGH (ref 0–0)
PHOSPHATE SERPL-MCNC: 3.1 MG/DL — SIGNIFICANT CHANGE UP (ref 2.5–4.5)
PLATELET # BLD AUTO: 368 K/UL — SIGNIFICANT CHANGE UP (ref 150–400)
POTASSIUM SERPL-MCNC: 4.6 MMOL/L — SIGNIFICANT CHANGE UP (ref 3.5–5.3)
POTASSIUM SERPL-SCNC: 4.6 MMOL/L — SIGNIFICANT CHANGE UP (ref 3.5–5.3)
PROT SERPL-MCNC: 6.5 G/DL — SIGNIFICANT CHANGE UP (ref 6–8.3)
PROTHROM AB SERPL-ACNC: 12.1 SEC — SIGNIFICANT CHANGE UP (ref 9.9–13.4)
RBC # BLD: 2.85 M/UL — LOW (ref 4.2–5.8)
RBC # BLD: 2.85 M/UL — LOW (ref 4.2–5.8)
RBC # FLD: 17.8 % — HIGH (ref 10.3–14.5)
RETICS #: 125.7 K/UL — HIGH (ref 25–125)
RETICS/RBC NFR: 4.4 % — HIGH (ref 0.5–2.5)
RH IG SCN BLD-IMP: POSITIVE — SIGNIFICANT CHANGE UP
SODIUM SERPL-SCNC: 132 MMOL/L — LOW (ref 135–145)
WBC # BLD: 10.63 K/UL — HIGH (ref 3.8–10.5)
WBC # FLD AUTO: 10.63 K/UL — HIGH (ref 3.8–10.5)

## 2024-12-30 PROCEDURE — 99233 SBSQ HOSP IP/OBS HIGH 50: CPT

## 2024-12-30 PROCEDURE — 36590 REMOVAL TUNNELED CV CATH: CPT

## 2024-12-30 RX ADMIN — Medication 1 MILLIGRAM(S): at 11:22

## 2024-12-30 RX ADMIN — PANTOPRAZOLE 40 MILLIGRAM(S): 40 TABLET, DELAYED RELEASE ORAL at 05:08

## 2024-12-30 RX ADMIN — Medication 30 MILLILITER(S): at 11:04

## 2024-12-30 RX ADMIN — Medication 30 MILLILITER(S): at 20:24

## 2024-12-30 RX ADMIN — Medication 30 MILLILITER(S): at 16:37

## 2024-12-30 RX ADMIN — ERTAPENEM SODIUM 120 MILLIGRAM(S): 1 INJECTION, POWDER, LYOPHILIZED, FOR SOLUTION INTRAMUSCULAR; INTRAVENOUS at 22:17

## 2024-12-30 RX ADMIN — URSODIOL 500 MILLIGRAM(S): 250 TABLET, FILM COATED ORAL at 17:27

## 2024-12-30 RX ADMIN — URSODIOL 500 MILLIGRAM(S): 250 TABLET, FILM COATED ORAL at 05:08

## 2024-12-30 RX ADMIN — SENNOSIDES 2 TABLET(S): 8.6 TABLET, FILM COATED ORAL at 21:13

## 2024-12-30 RX ADMIN — ALLOPURINOL 50 MILLIGRAM(S): 100 TABLET ORAL at 11:21

## 2024-12-30 RX ADMIN — Medication 1 APPLICATION(S): at 17:27

## 2024-12-30 RX ADMIN — SODIUM CHLORIDE 30 MILLILITER(S): 9 INJECTION, SOLUTION INTRAVENOUS at 23:12

## 2024-12-30 RX ADMIN — NIFEDIPINE 30 MILLIGRAM(S): 60 TABLET, EXTENDED RELEASE ORAL at 05:08

## 2024-12-30 RX ADMIN — Medication 30 MILLILITER(S): at 08:19

## 2024-12-30 RX ADMIN — CHLORHEXIDINE GLUCONATE 1 APPLICATION(S): 1.2 RINSE ORAL at 11:21

## 2024-12-30 RX ADMIN — ENOXAPARIN SODIUM 40 MILLIGRAM(S): 60 INJECTION INTRAVENOUS; SUBCUTANEOUS at 17:27

## 2024-12-30 NOTE — PROGRESS NOTE ADULT - NS ATTEND AMEND GEN_ALL_CORE FT
Saturating well on room air   Planned for port removal today per ID recs  Remains on antibiotics   Will need outpatient urology follow-up for work-up of possible urothelial lesion

## 2024-12-30 NOTE — PRE PROCEDURE NOTE - HISTORY OF PRESENT ILLNESS
Interventional Radiology  Pre-Procedure Note    This is a 44y  Male  presenting for port removal    HPI:  Patient is a 44M with a PMH of sickle cell, prior CTAs for acute chest  who presents to the ED for dyspnea.  Patient states that he had a mechanical fall two days ago - landed on his L ribs.  Patient notes significant pain to his ribs, pain radiates to his back as well.  Patient notes that after his fall he has developed significant dyspnea.  States that he cannot walk from room to room in his home without developing SOB.  Reports mild dyspnea prior to the fall but s/p fall it is much more significant.  Patient also complains of bilateral lower extremity swelling - states he was recently discharged from [facility] on lasix.  Patient states that he took the lasix for two weeks as prescribed but still has LE swelling.  Slipped and fell as he was walking in slippers and cannot put on his shoes secondary to edema.  No other complaints.  Tachy to 123 in triage.  Patient initially required 6L O2 via NC, SpOO2 currently 100% on 3L O2 via NC.  Will admit to tele.      (09 Dec 2024 10:05)      PAST MEDICAL & SURGICAL HISTORY:  Sickle cell anemia      Gout      History of cholecystectomy          Social History:     FAMILY HISTORY:  Family history of sickle cell trait (Father, Mother)    Patient's father is  (Father)    Patient's mother is  (Mother)        Allergies: penicillin (Pruritus)  hydroxyurea (Other)  ceftriaxone (Anaphylaxis)  piperacillin-tazobactam (Urticaria)      Current Medications: allopurinol 50 milliGRAM(s) Oral daily  calcium gluconate IVPB 2 Gram(s) IV Intermittent once  chlorhexidine 2% Cloths 1 Application(s) Topical daily  enoxaparin Injectable 40 milliGRAM(s) SubCutaneous every 24 hours  ertapenem  IVPB 1000 milliGRAM(s) IV Intermittent every 24 hours  folic acid 1 milliGRAM(s) Oral every 24 hours  HYDROmorphone PCA (5 mG/mL) 30 milliLiter(s) PCA Continuous PCA Continuous  influenza   Vaccine 0.5 milliLiter(s) IntraMuscular once  mupirocin 2% Nasal 1 Application(s) Both Nostrils two times a day  naloxone Injectable 0.1 milliGRAM(s) IV Push once PRN  NIFEdipine XL 30 milliGRAM(s) Oral daily  ondansetron Injectable 4 milliGRAM(s) IV Push every 8 hours PRN  pantoprazole    Tablet 40 milliGRAM(s) Oral before breakfast  senna 2 Tablet(s) Oral at bedtime  sodium chloride 0.45%. 1000 milliLiter(s) IV Continuous <Continuous>  ursodiol Tablet 500 milliGRAM(s) Oral two times a day      Labs:                         8.5    10.63 )-----------( 368      ( 30 Dec 2024 04:00 )             24.8       12-30    132[L]  |  100  |  17  ----------------------------<  92  4.6   |  20[L]  |  0.79    Ca    8.7      30 Dec 2024 04:00  Phos  3.1     12-30  Mg     1.70     12-30    TPro  6.5  /  Alb  3.2[L]  /  TBili  5.0[H]  /  DBili  x   /  AST  52[H]  /  ALT  31  /  AlkPhos  140[H]  12-30      Cultures:   Culture - Blood (24 @ 13:15)    Gram Stain:   Growth in anaerobic bottle: Gram Negative Rods   Specimen Source: .Blood BLOOD   Culture Results:   Growth in anaerobic bottle: Pantoea dispersa  See previous culture [id number]        Assessment/Plan:   This is a 44y Male  presents with bacteremia  Patient presents to IR for port removal.  Procedure/ risks/ benefits/ goals/ alternatives were explained. All questions answered. Informed content obtained from patient. Consent placed in chart.

## 2024-12-30 NOTE — PROGRESS NOTE ADULT - PROBLEM SELECTOR PLAN 1
Fever to 101.2 at 21:35 on 12/17/24. Reported to have O2 sat in 80s With leukocytosis. focal infectious symptoms. Recent staph epi bacteremia during November admission here, seen by ID at that time. Patient has port in place,   - blood culture with Enterobacterales and pantoea, ID reconsulted, rec to switch meropenem (12/20-12/24) to ertapenem 1 gm q24h (12/24 - ), blood cultures from 12/26 still showing pantoea, repeat blood Cx on 12/28 NGTD  - IR consulted - plan to perform mediport [id number]  -repeat blood cx ordered after port removal

## 2024-12-30 NOTE — PROCEDURE NOTE - PROCEDURE FINDINGS AND DETAILS
Intact port removal
Right chest wall port without kink of fracture. New Márquez needle used for port study. Easy blood return, easy flush.   Contrast injection shows no fibrin sheath. Port remains accessed post procedure. Locked with heparin.

## 2024-12-30 NOTE — PROGRESS NOTE ADULT - SUBJECTIVE AND OBJECTIVE BOX
Patient is a 44y old  Male who presents with a chief complaint of mech fall, pain, dyspnea (29 Dec 2024 11:00)  Patient seen, mediport removal pending as blood cx positive from 12/26  renal lesion suspicious for urothelial neoplasm      MEDICATIONS  (STANDING):  allopurinol 50 milliGRAM(s) Oral daily  calcium gluconate IVPB 2 Gram(s) IV Intermittent once  chlorhexidine 2% Cloths 1 Application(s) Topical daily  enoxaparin Injectable 40 milliGRAM(s) SubCutaneous every 24 hours  ertapenem  IVPB 1000 milliGRAM(s) IV Intermittent every 24 hours  folic acid 1 milliGRAM(s) Oral every 24 hours  HYDROmorphone PCA (5 mG/mL) 30 milliLiter(s) PCA Continuous PCA Continuous  influenza   Vaccine 0.5 milliLiter(s) IntraMuscular once  mupirocin 2% Nasal 1 Application(s) Both Nostrils two times a day  NIFEdipine XL 30 milliGRAM(s) Oral daily  pantoprazole    Tablet 40 milliGRAM(s) Oral before breakfast  senna 2 Tablet(s) Oral at bedtime  sodium chloride 0.45%. 1000 milliLiter(s) (30 mL/Hr) IV Continuous <Continuous>  ursodiol Tablet 500 milliGRAM(s) Oral two times a day    MEDICATIONS  (PRN):  naloxone Injectable 0.1 milliGRAM(s) IV Push once PRN Opioid Overdose  ondansetron Injectable 4 milliGRAM(s) IV Push every 8 hours PRN Nausea and/or Vomiting      Vital Signs Last 24 Hrs  T(C): 36.7 (30 Dec 2024 05:00), Max: 37.1 (29 Dec 2024 21:10)  T(F): 98.1 (30 Dec 2024 05:00), Max: 98.8 (29 Dec 2024 21:10)  HR: 86 (30 Dec 2024 05:00) (81 - 88)  BP: 136/87 (30 Dec 2024 05:00) (127/82 - 147/93)  BP(mean): --  RR: 17 (30 Dec 2024 05:00) (16 - 18)  SpO2: 100% (30 Dec 2024 05:00) (98% - 100%)    Parameters below as of 30 Dec 2024 05:00  Patient On (Oxygen Delivery Method): room air        PE  NAD  Awake, alert  Anicteric, MMM  RRR  CTAB  Abd soft, NT, ND  No c/c/e  No rash grossly                            8.5    10.63 )-----------( 368      ( 30 Dec 2024 04:00 )             24.8       12-30    132[L]  |  100  |  17  ----------------------------<  92  4.6   |  20[L]  |  0.79    Ca    8.7      30 Dec 2024 04:00  Phos  3.1     12-30  Mg     1.70     12-30    TPro  6.5  /  Alb  3.2[L]  /  TBili  5.0[H]  /  DBili  x   /  AST  52[H]  /  ALT  31  /  AlkPhos  140[H]  12-30

## 2024-12-30 NOTE — PROGRESS NOTE ADULT - PROBLEM SELECTOR PLAN 2
Pt spiking intermittent fever, new CXR showed opacities ( neg CXR x 3 in the past few days), high suspicion for acute chest,   Patient reports hgb baseline is ~ 5.5  - Discussed with Mayuri, s/p exhange transfusion 12/26 given high suspicion for acute chest syndrome and liver involvement; f/u Hgb Electrophoresis shows HbS: 10.6% (goal <30%); s/p port study 12/26 (port intact, continue to use)  - C/w dilaudid PCA, monitor pain, will gradually wean pt off PCA; pt takes Oxycodone 30 mg PRN at home  - Bowel regimen with Senna daily, miralax prn  - Patient follows Dr Jordan of [facility] - appreciate hem/onc input, s/p 2 unit pRBC this admission. Trend Hgb.   - Pt on Jadenu at home, discussed with Mayuri onc, hold at this time, plan to resume at time of discharge.

## 2024-12-30 NOTE — PROGRESS NOTE ADULT - SUBJECTIVE AND OBJECTIVE BOX
[facility] Division of Hospital Medicine  Jaylan Kenney MD  Pager [phone]      Patient is a 44y old  Male who [facility] a [facility], [facility] (30 Dec 2024 08:31)      SUBJECTIVE / OVERNIGHT EVENTS:    no fever o/n. pain controlled on PCA. no new     ADDITIONAL REVIEW OF SYSTEMS:    CONSTITUTIONAL: No fever, weight loss, or fatigue  EYES: No eye pain, visual disturbances, or discharge  ENMT:  No difficulty hearing, tinnitus, vertigo; No sinus or throat pain  NECK: No pain or stiffness  RESPIRATORY: No cough, wheezing, chills or hemoptysis; No shortness of breath  CARDIOVASCULAR: No chest pain, palpitations, dizziness, or leg swelling  GASTROINTESTINAL: No abdominal or epigastric pain. No nausea, vomiting, or hematemesis; No diarrhea or constipation. No melena or hematochezia.  GENITOURINARY: No dysuria, frequency, hematuria, or incontinence  NEUROLOGICAL: No headaches, memory loss, loss of strength, numbness, or tremors  SKIN: No itching, burning, rashes, or lesions   MUSCULOSKELETAL: No joint pain or swelling; No muscle, back, or extremity pain  PSYCHIATRIC: No depression, anxiety, mood swings, or difficulty sleeping    MEDICATIONS  (STANDING):  allopurinol 50 milliGRAM(s) Oral daily  calcium gluconate IVPB 2 Gram(s) IV Intermittent once  chlorhexidine 2% Cloths 1 Application(s) Topical daily  enoxaparin Injectable 40 milliGRAM(s) SubCutaneous every 24 hours  ertapenem  IVPB 1000 milliGRAM(s) IV Intermittent every 24 hours  folic acid 1 milliGRAM(s) Oral every 24 hours  HYDROmorphone PCA (5 mG/mL) 30 milliLiter(s) PCA Continuous PCA Continuous  influenza   Vaccine 0.5 milliLiter(s) IntraMuscular once  mupirocin 2% Nasal 1 Application(s) Both Nostrils two times a day  NIFEdipine XL 30 milliGRAM(s) Oral daily  pantoprazole    Tablet 40 milliGRAM(s) Oral before breakfast  senna 2 Tablet(s) Oral at bedtime  sodium chloride 0.45%. 1000 milliLiter(s) (30 mL/Hr) IV Continuous <Continuous>  ursodiol Tablet 500 milliGRAM(s) Oral two times a day    MEDICATIONS  (PRN):  naloxone Injectable 0.1 milliGRAM(s) IV Push once PRN Opioid Overdose  ondansetron Injectable 4 milliGRAM(s) IV Push every 8 hours PRN Nausea and/or Vomiting      CAPILLARY BLOOD GLUCOSE        I&O's Summary    29 Dec 2024 07:01  -  30 Dec 2024 07:00  --------------------------------------------------------  IN: 1850 mL / OUT: 2000 mL / NET: -150 mL        PHYSICAL EXAM:  Vital Signs Last 24 Hrs  T(C): 37.1 (30 Dec 2024 13:55), Max: 37.1 (29 Dec 2024 21:10)  T(F): 98.8 (30 Dec 2024 11:19), Max: 98.8 (29 Dec 2024 21:10)  HR: 100 (30 Dec 2024 13:55) (81 - 100)  BP: 140/87 (30 Dec 2024 13:55) (127/82 - 140/87)  BP(mean): --  RR: 18 (30 Dec 2024 13:55) (16 - 18)  SpO2: 98% (30 Dec 2024 11:19) (97% - 100%)    Parameters below as of 30 Dec 2024 11:19  Patient On (Oxygen Delivery Method): room air        CONSTITUTIONAL: NAD,  EYES: PERRLA; conjunctiva and sclera clear  ENMT: Moist oral mucosa, no pharyngeal injection or exudates;   NECK: Supple, no palpable masses;  RESPIRATORY: Normal respiratory effort; lungs are clear to auscultation bilaterally  CARDIOVASCULAR: Regular rate and rhythm, normal S1 and S2, no murmur/rub/gallop; No lower extremity edema; Peripheral pulses are 2+ bilaterally. R chest mediport in place   ABDOMEN: Nontender to palpation, normoactive bowel sounds, no rebound/guarding;   MUSCLOSKELETAL:   no clubbing or cyanosis of digits; no joint swelling or tenderness to palpation  PSYCH: A+O to person, place, and time; affect appropriate  NEUROLOGY: CN 2-12 are intact and symmetric; no gross sensory deficits;   SKIN: No rashes;     LABS:                        8.5    10.63 )-----------( 368      ( 30 Dec 2024 04:00 )             24.8     12-30    132[L]  |  100  |  17  ----------------------------<  92  4.6   |  20[L]  |  0.79    Ca    8.7      30 Dec 2024 04:00  Phos  3.1     12-30  Mg     1.70     12-30    TPro  6.5  /  Alb  3.2[L]  /  TBili  5.0[H]  /  DBili  x   /  AST  52[H]  /  ALT  31  /  AlkPhos  140[H]  12-30    PT/INR - ( 30 Dec 2024 04:00 )   PT: 12.1 sec;   INR: 1.04 ratio               Urinalysis Basic - ( 30 Dec 2024 04:00 )    Color: x / Appearance: x / SG: x / pH: x  Gluc: 92 mg/dL / Ketone: x  / Bili: x / Urobili: x   Blood: x / Protein: x / Nitrite: x   Leuk Esterase: x / RBC: x / WBC x   Sq Epi: x / Non Sq Epi: x / Bacteria: x        Culture - Blood (collected 28 Dec 2024 06:03)  Source: .Blood BLOOD  Preliminary Report (30 Dec 2024 10:01):    No growth at 48 Hours    Culture - Blood (collected 28 Dec 2024 05:45)  Source: .Blood BLOOD  Preliminary Report (30 Dec 2024 09:01):    No growth at 48 Hours        RADIOLOGY & ADDITIONAL TESTS:  Results Reviewed:   Imaging Personally Reviewed:  Electrocardiogram Personally Reviewed:    COORDINATION OF CARE:  Care Discussed with Consultants/Other Providers [Y/N]:  Prior or Outpatient Records Reviewed [Y/N]:

## 2024-12-30 NOTE — PROVIDER CONTACT NOTE (OTHER) - ACTION/TREATMENT ORDERED:
Provider notified, stated "elevate the extremity and I will come and take a look." No further orders at this time.

## 2024-12-30 NOTE — PROGRESS NOTE ADULT - ASSESSMENT
44 year old male with history of sickle cell anemia presenting today s/p mechanical fall    Sickle cell Disease - Pain Crisis   - Follows with Dr Jordan of [facility]  - s/p 1u PRBCs 12/18/24   - Chronic hemolysis, iron overload; trying to limit transfusions - Hgb goal > 6.0  -  Jadenu on hold for now  - Maximize pain control; currently on Dilaudid PCA pump    Gram negative bacteremia  - Source unclear but noted to have rising DIRECT bilirubin   - Repeat cultures from 12/26 are positive, Mediport removal today as unable to determine source of infection    Rising D-bili   - s/p MRCP which showed no obstruction, but underlying iron overload  - s/p exchange transfusion for 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; goal Hb S < 30%, Hgb 8.7, feels better  - Hepatology consulted, appreciate recs  - Hb S levels 7.7, Tbili improved to 5.0  -Medicine team appreciated    Hypoxia   - Mild and intermittent; previously walked and had no desaturation on ambulation and there is no evidence of infiltrate on chest x-rays or CT and no chest pain, most recent CXR without evidence of PNA but likely atelectasis - had not been using incentive spirometer - advised to use throughout the day; denies any chest pain     Urothelial lesion  CT urogram shows filling defects in the left renal collecting system  suspicious for urothelial neoplasm. stable abdominal and retroperitoneal lymphadenopathy  -Urology recs appreciated, outpatient follow up      Will cont to follow    Meghana Real NP  Hematology/Oncology  [facility]  [phone] (Office)  [phone] (Alt office)  Evenings and weekends please call MD on call or office           44 year old male with history of sickle cell anemia presenting today s/p mechanical fall    Sickle cell Disease - Pain Crisis   - Follows with Dr Jordan of [facility]  - s/p 1u PRBCs 12/18/24   - Chronic hemolysis, iron overload; trying to limit transfusions - Hgb goal > 6.0  - Jadenu on hold for now  - Maximize pain control; currently on Dilaudid PCA pump    Gram negative bacteremia  - Source unclear but noted to have rising DIRECT bilirubin   - Repeat cultures from 12/26 are positive, Mediport removal today as unable to determine source of infection    Rising D-bili   - s/p MRCP which showed no obstruction, but underlying iron overload  - s/p exchange transfusion for 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; goal Hb S < 30%, Hgb 8.7, feels better  - Hepatology consulted, appreciate recs  - Hb S levels 7.7, Tbili improved to 5.0  -Medicine team appreciated    Hypoxia   - Mild and intermittent; previously walked and had no desaturation on ambulation and there is no evidence of infiltrate on chest x-rays or CT and no chest pain, most recent CXR without evidence of PNA but likely atelectasis - had not been using incentive spirometer - advised to use throughout the day; denies any chest pain     Urothelial lesion  CT urogram shows filling defects in the left renal collecting system  suspicious for urothelial neoplasm. stable abdominal and retroperitoneal lymphadenopathy  -Urology recs appreciated, outpatient follow up      Will cont to follow    Meghana Real NP  Hematology/Oncology  [facility]  [phone] (Office)  [phone] (Alt office)  Evenings and weekends please call MD on call or office

## 2024-12-31 ENCOUNTER — RESULT REVIEW (OUTPATIENT)
Age: 44
End: 2024-12-31

## 2024-12-31 DIAGNOSIS — I82.409 ACUTE EMBOLISM AND THROMBOSIS OF UNSPECIFIED DEEP VEINS OF UNSPECIFIED LOWER EXTREMITY: ICD-10-CM

## 2024-12-31 LAB
ALBUMIN SERPL ELPH-MCNC: 3.4 G/DL — SIGNIFICANT CHANGE UP (ref 3.3–5)
ALP SERPL-CCNC: 152 U/L — HIGH (ref 40–120)
ALT FLD-CCNC: 33 U/L — SIGNIFICANT CHANGE UP (ref 4–41)
ANION GAP SERPL CALC-SCNC: 10 MMOL/L — SIGNIFICANT CHANGE UP (ref 7–14)
AST SERPL-CCNC: 71 U/L — HIGH (ref 4–40)
BASOPHILS # BLD AUTO: 0.1 K/UL — SIGNIFICANT CHANGE UP (ref 0–0.2)
BASOPHILS NFR BLD AUTO: 0.9 % — SIGNIFICANT CHANGE UP (ref 0–2)
BILIRUB SERPL-MCNC: 6.2 MG/DL — HIGH (ref 0.2–1.2)
BUN SERPL-MCNC: 20 MG/DL — SIGNIFICANT CHANGE UP (ref 7–23)
CALCIUM SERPL-MCNC: 9.2 MG/DL — SIGNIFICANT CHANGE UP (ref 8.4–10.5)
CHLORIDE SERPL-SCNC: 100 MMOL/L — SIGNIFICANT CHANGE UP (ref 98–107)
CO2 SERPL-SCNC: 22 MMOL/L — SIGNIFICANT CHANGE UP (ref 22–31)
CREAT SERPL-MCNC: 0.78 MG/DL — SIGNIFICANT CHANGE UP (ref 0.5–1.3)
CULTURE RESULTS: SIGNIFICANT CHANGE UP
EGFR: 112 ML/MIN/1.73M2 — SIGNIFICANT CHANGE UP
EOSINOPHIL # BLD AUTO: 0.16 K/UL — SIGNIFICANT CHANGE UP (ref 0–0.5)
EOSINOPHIL NFR BLD AUTO: 1.5 % — SIGNIFICANT CHANGE UP (ref 0–6)
GLUCOSE SERPL-MCNC: 99 MG/DL — SIGNIFICANT CHANGE UP (ref 70–99)
HAPTOGLOB SERPL-MCNC: <20 MG/DL — LOW (ref 34–200)
HCT VFR BLD CALC: 26 % — LOW (ref 39–50)
HGB BLD-MCNC: 8.5 G/DL — LOW (ref 13–17)
IANC: 5.93 K/UL — SIGNIFICANT CHANGE UP (ref 1.8–7.4)
IMM GRANULOCYTES NFR BLD AUTO: 0.5 % — SIGNIFICANT CHANGE UP (ref 0–0.9)
INR BLD: 1.02 RATIO — SIGNIFICANT CHANGE UP (ref 0.85–1.16)
LDH SERPL L TO P-CCNC: 440 U/L — HIGH (ref 135–225)
LYMPHOCYTES # BLD AUTO: 2.99 K/UL — SIGNIFICANT CHANGE UP (ref 1–3.3)
LYMPHOCYTES # BLD AUTO: 28.3 % — SIGNIFICANT CHANGE UP (ref 13–44)
MAGNESIUM SERPL-MCNC: 1.8 MG/DL — SIGNIFICANT CHANGE UP (ref 1.6–2.6)
MCHC RBC-ENTMCNC: 29 PG — SIGNIFICANT CHANGE UP (ref 27–34)
MCHC RBC-ENTMCNC: 32.7 G/DL — SIGNIFICANT CHANGE UP (ref 32–36)
MCV RBC AUTO: 88.7 FL — SIGNIFICANT CHANGE UP (ref 80–100)
MONOCYTES # BLD AUTO: 1.32 K/UL — HIGH (ref 0–0.9)
MONOCYTES NFR BLD AUTO: 12.5 % — SIGNIFICANT CHANGE UP (ref 2–14)
NEUTROPHILS # BLD AUTO: 5.93 K/UL — SIGNIFICANT CHANGE UP (ref 1.8–7.4)
NEUTROPHILS NFR BLD AUTO: 56.3 % — SIGNIFICANT CHANGE UP (ref 43–77)
NRBC # BLD: 0 /100 WBCS — SIGNIFICANT CHANGE UP (ref 0–0)
NRBC # FLD: 0.02 K/UL — HIGH (ref 0–0)
PHOSPHATE SERPL-MCNC: 3.1 MG/DL — SIGNIFICANT CHANGE UP (ref 2.5–4.5)
PLATELET # BLD AUTO: 446 K/UL — HIGH (ref 150–400)
POTASSIUM SERPL-MCNC: 4.4 MMOL/L — SIGNIFICANT CHANGE UP (ref 3.5–5.3)
POTASSIUM SERPL-SCNC: 4.4 MMOL/L — SIGNIFICANT CHANGE UP (ref 3.5–5.3)
PROT SERPL-MCNC: 7.5 G/DL — SIGNIFICANT CHANGE UP (ref 6–8.3)
PROTHROM AB SERPL-ACNC: 12.1 SEC — SIGNIFICANT CHANGE UP (ref 9.9–13.4)
RBC # BLD: 2.93 M/UL — LOW (ref 4.2–5.8)
RBC # BLD: 2.93 M/UL — LOW (ref 4.2–5.8)
RBC # FLD: 17.7 % — HIGH (ref 10.3–14.5)
RETICS #: 70 K/UL — SIGNIFICANT CHANGE UP (ref 25–125)
RETICS/RBC NFR: 2.4 % — SIGNIFICANT CHANGE UP (ref 0.5–2.5)
SODIUM SERPL-SCNC: 132 MMOL/L — LOW (ref 135–145)
SPECIMEN SOURCE: SIGNIFICANT CHANGE UP
WBC # BLD: 10.55 K/UL — HIGH (ref 3.8–10.5)
WBC # FLD AUTO: 10.55 K/UL — HIGH (ref 3.8–10.5)

## 2024-12-31 PROCEDURE — 93970 EXTREMITY STUDY: CPT | Mod: 26

## 2024-12-31 PROCEDURE — 99232 SBSQ HOSP IP/OBS MODERATE 35: CPT

## 2024-12-31 PROCEDURE — 99233 SBSQ HOSP IP/OBS HIGH 50: CPT

## 2024-12-31 RX ORDER — APIXABAN 5 MG/1
1 TABLET, FILM COATED ORAL
Qty: 74 | Refills: 0
Start: 2024-12-31 | End: 2025-01-29

## 2024-12-31 RX ORDER — APIXABAN 5 MG/1
2 TABLET, FILM COATED ORAL
Qty: 74 | Refills: 0
Start: 2024-12-31 | End: 2025-01-29

## 2024-12-31 RX ORDER — APIXABAN 5 MG/1
10 TABLET, FILM COATED ORAL EVERY 12 HOURS
Refills: 0 | Status: DISCONTINUED | OUTPATIENT
Start: 2024-12-31 | End: 2025-01-03

## 2024-12-31 RX ADMIN — NIFEDIPINE 30 MILLIGRAM(S): 60 TABLET, EXTENDED RELEASE ORAL at 05:04

## 2024-12-31 RX ADMIN — Medication 30 MILLILITER(S): at 20:29

## 2024-12-31 RX ADMIN — CHLORHEXIDINE GLUCONATE 1 APPLICATION(S): 1.2 RINSE ORAL at 12:57

## 2024-12-31 RX ADMIN — Medication 1 MILLIGRAM(S): at 12:56

## 2024-12-31 RX ADMIN — Medication 30 MILLILITER(S): at 07:36

## 2024-12-31 RX ADMIN — URSODIOL 500 MILLIGRAM(S): 250 TABLET, FILM COATED ORAL at 18:00

## 2024-12-31 RX ADMIN — URSODIOL 500 MILLIGRAM(S): 250 TABLET, FILM COATED ORAL at 05:01

## 2024-12-31 RX ADMIN — PANTOPRAZOLE 40 MILLIGRAM(S): 40 TABLET, DELAYED RELEASE ORAL at 05:01

## 2024-12-31 RX ADMIN — APIXABAN 10 MILLIGRAM(S): 5 TABLET, FILM COATED ORAL at 17:59

## 2024-12-31 RX ADMIN — SENNOSIDES 2 TABLET(S): 8.6 TABLET, FILM COATED ORAL at 22:36

## 2024-12-31 RX ADMIN — ALLOPURINOL 50 MILLIGRAM(S): 100 TABLET ORAL at 12:56

## 2024-12-31 RX ADMIN — ERTAPENEM SODIUM 120 MILLIGRAM(S): 1 INJECTION, POWDER, LYOPHILIZED, FOR SOLUTION INTRAMUSCULAR; INTRAVENOUS at 22:36

## 2024-12-31 NOTE — PROGRESS NOTE ADULT - SUBJECTIVE AND OBJECTIVE BOX
Patient is a 44y old  Male who [facility] a [facility] (31 Dec 2024 09:36)    Patient seen at bedside today.  Port removed.    MEDICATIONS  (STANDING):  allopurinol 50 milliGRAM(s) Oral daily  calcium gluconate IVPB 2 Gram(s) IV Intermittent once  chlorhexidine 2% Cloths 1 Application(s) Topical daily  enoxaparin Injectable 40 milliGRAM(s) SubCutaneous every 24 hours  ertapenem  IVPB 1000 milliGRAM(s) IV Intermittent every 24 hours  folic acid 1 milliGRAM(s) Oral every 24 hours  HYDROmorphone PCA (5 mG/mL) 30 milliLiter(s) PCA Continuous PCA Continuous  influenza   Vaccine 0.5 milliLiter(s) IntraMuscular once  NIFEdipine XL 30 milliGRAM(s) Oral daily  pantoprazole    Tablet 40 milliGRAM(s) Oral before breakfast  senna 2 Tablet(s) Oral at bedtime  sodium chloride 0.45%. 1000 milliLiter(s) (30 mL/Hr) IV Continuous <Continuous>  ursodiol Tablet 500 milliGRAM(s) Oral two times a day    MEDICATIONS  (PRN):  naloxone Injectable 0.1 milliGRAM(s) IV Push once PRN Opioid Overdose  ondansetron Injectable 4 milliGRAM(s) IV Push every 8 hours PRN Nausea and/or Vomiting      ROS  No fever, sweats, chills      Vital Signs Last 24 Hrs  T(C): 36.8 (31 Dec 2024 09:00), Max: 37.1 (30 Dec 2024 11:19)  T(F): 98.3 (31 Dec 2024 09:00), Max: 98.8 (30 Dec 2024 11:19)  HR: 81 (31 Dec 2024 09:00) (81 - 100)  BP: 132/86 (31 Dec 2024 09:00) (122/75 - 142/90)  BP(mean): --  RR: 17 (31 Dec 2024 09:00) (16 - 18)  SpO2: 98% (31 Dec 2024 09:00) (97% - 99%)    Parameters below as of 31 Dec 2024 09:00  Patient On (Oxygen Delivery Method): room air        PE  NAD  Awake, alert                            8.5    10.55 )-----------( 446      ( 31 Dec 2024 08:40 )             26.0       12-31    132[L]  |  100  |  20  ----------------------------<  99  4.4   |  22  |  0.78    Ca    9.2      31 Dec 2024 08:40  Phos  3.1     12-31  Mg     1.80     12-31    TPro  7.5  /  Alb  3.4  /  TBili  6.2[H]  /  DBili  x   /  AST  71[H]  /  ALT  33  /  AlkPhos  152[H]  12-31       Patient is a 44y old  Male who [facility] a [facility], dysp[facility] (31 Dec 2024 09:36)    Patient seen at bedside today.  Port removed.  Is feeling well.     MEDICATIONS  (STANDING):  allopurinol 50 milliGRAM(s) Oral daily  calcium gluconate IVPB 2 Gram(s) IV Intermittent once  chlorhexidine 2% Cloths 1 Application(s) Topical daily  enoxaparin Injectable 40 milliGRAM(s) SubCutaneous every 24 hours  ertapenem  IVPB 1000 milliGRAM(s) IV Intermittent every 24 hours  folic acid 1 milliGRAM(s) Oral every 24 hours  HYDROmorphone PCA (5 mG/mL) 30 milliLiter(s) PCA Continuous PCA Continuous  influenza   Vaccine 0.5 milliLiter(s) IntraMuscular once  NIFEdipine XL 30 milliGRAM(s) Oral daily  pantoprazole    Tablet 40 milliGRAM(s) Oral before breakfast  senna 2 Tablet(s) Oral at bedtime  sodium chloride 0.45%. 1000 milliLiter(s) (30 mL/Hr) IV Continuous <Continuous>  ursodiol Tablet 500 milliGRAM(s) Oral two times a day    MEDICATIONS  (PRN):  naloxone Injectable 0.1 milliGRAM(s) IV Push once PRN Opioid Overdose  ondansetron Injectable 4 milliGRAM(s) IV Push every 8 hours PRN Nausea and/or Vomiting      ROS  No fever, sweats, chills      Vital Signs Last 24 Hrs  T(C): 36.8 (31 Dec 2024 09:00), Max: 37.1 (30 Dec 2024 11:19)  T(F): 98.3 (31 Dec 2024 09:00), Max: 98.8 (30 Dec 2024 11:19)  HR: 81 (31 Dec 2024 09:00) (81 - 100)  BP: 132/86 (31 Dec 2024 09:00) (122/75 - 142/90)  BP(mean): --  RR: 17 (31 Dec 2024 09:00) (16 - 18)  SpO2: 98% (31 Dec 2024 09:00) (97% - 99%)    Parameters below as of 31 Dec 2024 09:00  Patient On (Oxygen Delivery Method): room air        PE  NAD  Awake, alert                            8.5    10.55 )-----------( 446      ( 31 Dec 2024 08:40 )             26.0       12-31    132[L]  |  100  |  20  ----------------------------<  99  4.4   |  22  |  0.78    Ca    9.2      31 Dec 2024 08:40  Phos  3.1     12-31  Mg     1.80     12-31    TPro  7.5  /  Alb  3.4  /  TBili  6.2[H]  /  DBili  x   /  AST  71[H]  /  ALT  33  /  AlkPhos  152[H]  12-31

## 2024-12-31 NOTE — PROGRESS NOTE ADULT - NS ATTEND AMEND GEN_ALL_CORE FT
Patient seen and examined with the PA during rounds  I agree with her assessment and plan    Had suboptimal response to PRBC exchange  persistent elevated bilirubin could be related to liver disease/iron overload  can check D-Noam to r/o degree of hemolysis   Has bacteremia on Abx  Appreciate hepatology input

## 2024-12-31 NOTE — PROGRESS NOTE ADULT - ASSESSMENT
44 m with sickle cell disease with a port and previous staph epi bacteremia admitted 12/9 after mechanical fall.  noted to be anemic; received a blood transfusion 12/13 and 12/18  CTA no PE  Fever 101 on 12/17  WBC increased   blood cultures 12/17 negative  fever again to 102.3 on 12/20  blood cx 12/20 with pantoa    meropenem 12/20-12/24  ertapenem 12/24-    sickle cell with port and previous staph epi bacteremia now admitted with fall and then crisis    new fever in the hospital; pantoea bacteremia 12/20    hospital acquired bacteremia    repeat blood cultures 12/22 no growth 1/2; pantoea in other after 4 days     12/26 blood culture pantoea         with persistently positive blood cultures concern for seeding port    port removed 12/30       VA duplex 12/31  hyperechoic material walls right IJ suggestive of non -occlusive vein thrombosis     blood cultures 12/28 no growth to date     h/o penicillin allergy    Suggest:    TTE    follow final result of 12/28 blood cultures and f/u blood cultures drawn after port removal     continue  ertapenem 1 gm iv q 24 h       ID service available over holiday

## 2024-12-31 NOTE — PROGRESS NOTE ADULT - SUBJECTIVE AND OBJECTIVE BOX
[facility] Division of Hospital Medicine  Jaylan Kenney MD  Pager [phone]      Patient is a 44y old  Male who presents with a chief complaint of mech fall, pain, dyspnea (31 Dec 2024 16:16)      SUBJECTIVE / OVERNIGHT EVENTS:    no acute event o/n. cont to report R upper arm pain,  found to have RIJ/ R bracial DVT. reports sickle cell pain controlled on PCA.  no new complaints     ADDITIONAL REVIEW OF SYSTEMS:    RESPIRATORY: No cough, wheezing, chills or hemoptysis; No shortness of breath  CARDIOVASCULAR: No chest pain, palpitations, dizziness, or leg swelling  GASTROINTESTINAL: No abdominal or epigastric pain. No nausea, vomiting, or hematemesis; No diarrhea or constipation. No melena or hematochezia.    MEDICATIONS  (STANDING):  allopurinol 50 milliGRAM(s) Oral daily  apixaban 10 milliGRAM(s) Oral every 12 hours  calcium gluconate IVPB 2 Gram(s) IV Intermittent once  chlorhexidine 2% Cloths 1 Application(s) Topical daily  ertapenem  IVPB 1000 milliGRAM(s) IV Intermittent every 24 hours  folic acid 1 milliGRAM(s) Oral every 24 hours  HYDROmorphone PCA (5 mG/mL) 30 milliLiter(s) PCA Continuous PCA Continuous  influenza   Vaccine 0.5 milliLiter(s) IntraMuscular once  NIFEdipine XL 30 milliGRAM(s) Oral daily  pantoprazole    Tablet 40 milliGRAM(s) Oral before breakfast  senna 2 Tablet(s) Oral at bedtime  sodium chloride 0.45%. 1000 milliLiter(s) (30 mL/Hr) IV Continuous <Continuous>  ursodiol Tablet 500 milliGRAM(s) Oral two times a day    MEDICATIONS  (PRN):  naloxone Injectable 0.1 milliGRAM(s) IV Push once PRN Opioid Overdose  ondansetron Injectable 4 milliGRAM(s) IV Push every 8 hours PRN Nausea and/or Vomiting      CAPILLARY BLOOD GLUCOSE        I&O's Summary    30 Dec 2024 07:01  -  31 Dec 2024 07:00  --------------------------------------------------------  IN: 0 mL / OUT: 1300 mL / NET: -1300 mL        PHYSICAL EXAM:  Vital Signs Last 24 Hrs  T(C): 36.7 (31 Dec 2024 13:00), Max: 36.8 (31 Dec 2024 05:00)  T(F): 98 (31 Dec 2024 13:00), Max: 98.3 (31 Dec 2024 05:00)  HR: 87 (31 Dec 2024 13:00) (81 - 90)  BP: 137/88 (31 Dec 2024 13:00) (122/75 - 137/88)  BP(mean): --  RR: 18 (31 Dec 2024 13:00) (17 - 18)  SpO2: 98% (31 Dec 2024 13:00) (98% - 99%)    Parameters below as of 31 Dec 2024 13:00  Patient On (Oxygen Delivery Method): room air        CONSTITUTIONAL: NAD,  EYES: PERRLA; conjunctiva and sclera clear  ENMT: Moist oral mucosa, no pharyngeal injection or exudates;   NECK: Supple, no palpable masses;  RESPIRATORY: Normal respiratory effort; lungs are clear to auscultation bilaterally  CARDIOVASCULAR: Regular rate and rhythm, normal S1 and S2, no murmur/rub/gallop; R uppers extremity edema; Peripheral pulses are 2+ bilaterally  ABDOMEN: Nontender to palpation, normoactive bowel sounds, no rebound/guarding;   MUSCLOSKELETAL:   no clubbing or cyanosis of digits; no joint swelling or tenderness to palpation  PSYCH: A+O to person, place, and time; affect appropriate  NEUROLOGY: CN 2-12 are intact and symmetric; no gross sensory deficits;   SKIN: No rashes;     LABS:                        8.5    10.55 )-----------( 446      ( 31 Dec 2024 08:40 )             26.0     12-31    132[L]  |  100  |  20  ----------------------------<  99  4.4   |  22  |  0.78    Ca    9.2      31 Dec 2024 08:40  Phos  3.1     12-31  Mg     1.80     12-31    TPro  7.5  /  Alb  3.4  /  TBili  6.2[H]  /  DBili  x   /  AST  71[H]  /  ALT  33  /  AlkPhos  152[H]  12-31    PT/INR - ( 31 Dec 2024 08:40 )   PT: 12.1 sec;   INR: 1.02 ratio               Urinalysis Basic - ( 31 Dec 2024 08:40 )    Color: x / Appearance: x / SG: x / pH: x  Gluc: 99 mg/dL / Ketone: x  / Bili: x / Urobili: x   Blood: x / Protein: x / Nitrite: x   Leuk Esterase: x / RBC: x / WBC x   Sq Epi: x / Non Sq Epi: x / Bacteria: x          RADIOLOGY & ADDITIONAL TESTS:  Results Reviewed:   Imaging Personally Reviewed:  Electrocardiogram Personally Reviewed:    COORDINATION OF CARE:  Care Discussed with Consultants/Other Providers [Y/N]:  Prior or Outpatient Records Reviewed [Y/N]:

## 2024-12-31 NOTE — PROGRESS NOTE ADULT - PROBLEM SELECTOR PLAN 2
Pt spiking intermittent fever, new CXR showed opacities ( neg CXR x 3 in the past few days), high suspicion for acute chest,   Patient reports hgb baseline is ~ 5.5  - Discussed with Mayuri, s/p exhange transfusion 12/26 given high suspicion for acute chest syndrome and liver involvement; f/u Hgb Electrophoresis shows HbS: 10.6% (goal <30%); s/p port study 12/26 (port intact, continue to use)  - C/w dilaudid PCA, monitor pain, will gradually wean pt off PCA; pt takes Oxycodone 30 mg PRN at home  - Bowel regimen with Senna daily, miralax prn  - Patient follows Dr Jordan of [facility] - appreciate hem/onc input, s/p 2 unit pRBC this admission. Trend Hgb.   - Pt on Jadenu at home, discussed with Mayuri onc, hold at this time, plan to resume at time of discharge.

## 2024-12-31 NOTE — PROGRESS NOTE ADULT - SUBJECTIVE AND OBJECTIVE BOX
ANESTHESIA POSTOP CHECK    44y Male POSTOP DAY 1 S/P Mediport removal    Vital Signs Last 24 Hrs  T(C): 36.8 (31 Dec 2024 09:00), Max: 37.1 (30 Dec 2024 11:19)  T(F): 98.3 (31 Dec 2024 09:00), Max: 98.8 (30 Dec 2024 11:19)  HR: 81 (31 Dec 2024 09:00) (81 - 100)  BP: 132/86 (31 Dec 2024 09:00) (122/75 - 142/90)  BP(mean): --  RR: 17 (31 Dec 2024 09:00) (16 - 18)  SpO2: 98% (31 Dec 2024 09:00) (97% - 99%)    Parameters below as of 31 Dec 2024 09:00  Patient On (Oxygen Delivery Method): room air      I&O's Summary    30 Dec 2024 07:01  -  31 Dec 2024 07:00  --------------------------------------------------------  IN: 0 mL / OUT: 1300 mL / NET: -1300 mL        [X] NO APPARENT ANESTHESIA COMPLICATIONS      Comments:

## 2024-12-31 NOTE — PROGRESS NOTE ADULT - PROBLEM SELECTOR PLAN 8
DVT prop: lovenox. Elevated risk of DVT given SCC. No signs of bleeding.     Dispo: Home pending mediport removal, ID clearance (on IV Abx), and once pt is off Dilaudid PCA pump  - PT eval: no skilled PT needs

## 2024-12-31 NOTE — PROGRESS NOTE ADULT - SUBJECTIVE AND OBJECTIVE BOX
Follow Up:  fever    Interval History/ROS:    feels ok   port removed yesterday      blood culture 12/28 no growth x 72 h x 2       Allergies  penicillin (Pruritus)  hydroxyurea (Other)  ceftriaxone (Anaphylaxis)  piperacillin-tazobactam (Urticaria)        ANTIMICROBIALS:  ertapenem  IVPB 1000 every 24 hours    MEDICATIONS  (STANDING):  allopurinol 50 daily  apixaban 10 every 12 hours  HYDROmorphone PCA (5 mG/mL) 30 PCA Continuous  influenza   Vaccine 0.5 once  NIFEdipine XL 30 daily  ondansetron Injectable 4 every 8 hours PRN  pantoprazole    Tablet 40 before breakfast  senna 2 at bedtime  ursodiol Tablet 500 two times a day    Vital Signs Last 24 Hrs  T(F): 98 (12-31-24 @ 13:00), Max: 98.4 (12-30-24 @ 16:49)  HR: 87 (12-31-24 @ 13:00)  BP: 137/88 (12-31-24 @ 13:00)  RR: 18 (12-31-24 @ 13:00)  SpO2: 98% (12-31-24 @ 13:00) (97% - 99%)    Physical Exam:  General:    non toxic alert   Respiratory:    no respiratory distress RA  abd:     soft,    no tenderness  :  no  daniel  Musculoskeletal:   no joint swelling  vascular: R chest dressing   Skin:    no rash                                              8.5    10.55 )-----------( 446      ( 31 Dec 2024 08:40 )             26.0 12-31    132  |  100  |  20  ----------------------------<  99  4.4   |  22  |  0.78  Ca    9.2      31 Dec 2024 08:40Phos  3.1     12-31Mg     1.80     12-31  TPro  7.5  /  Alb  3.4  /  TBili  6.2  /  DBili  x   /  AST  71  /  ALT  33  /  AlkPhos  152  12-31          MICROBIOLOGY:    Culture - Blood in AM (12.28.24 @ 06:03)   Specimen Source: .Blood BLOOD  Culture Results:   No growth at 72 Hours  Culture - Blood in AM (12.28.24 @ 05:45)   Specimen Source: .Blood BLOOD  Culture Results:   No growth at 72 Hours  Culture - Blood (12.26.24 @ 13:15)   Gram Stain:   Growth in anaerobic bottle: Gram Negative Rods  Specimen Source: .Blood BLOOD  Culture Results:   Growth in anaerobic bottle: Pantoea terria   See previous culture [id number]  Culture - Blood (12.22.24 @ 13:30)   Gram Stain:   Growth in anaerobic bottle: Gram Negative Rods  Specimen Source: .Blood BLOOD  Culture Results:   Growth in anaerobic bottle: Pantoea terria   See previous culture [id number]    Culture - Blood (12.22.24 @ 13:30)   Specimen Source: .Blood BLOOD  Culture Results:   No growth     .Blood BLOOD  12-20-24   Growth in aerobic and anaerobic bottles: Pantoea terria  See previous culture [id number]  --    Growth in aerobic bottle: Gram Negative Rods  Growth in anaerobic bottle: Gram Negative Rods  Culture - Blood (12.20.24 @ 05:39)   Gram Stain:   Growth in aerobic bottle: Gram Negative Rods   Growth in anaerobic bottle: Gram Negative Rods  - Amoxicillin/Clavulanic Acid: S <=8/4  - Ampicillin: S <=8 These ampicillin results predict results for amoxicillin  - Ampicillin/Sulbactam: S <=4/2  - Aztreonam: S <=4  - Cefazolin: R >16  - Cefepime: S <=2  - Cefotaxime: S <=2  - Cefoxitin: R >16  - Ceftazidime: S <=1  - Ceftriaxone: S <=1  - Cefuroxime: S <=4  - Ciprofloxacin: S <=0.25  - Gentamicin: S <=2  - Levofloxacin: S <=0.5  - Meropenem: S <=1  - Ertapenem: S <=0.5  - Piperacillin/Tazobactam: S <=8  - Tigecycline: S <=2  - Tobramycin: S <=2  - Trimethoprim/Sulfamethoxazole: S <=0.5/9.5  - Minocycline: S <=4  - Enterobacterales: Detec  Specimen Source: .Blood BLOOD  Organism: Blood Culture PCR  Organism: Gram Negative Rods  Culture Results:   Growth in aerobic and anaerobic bottles: Juan Luisa terria   Direct identification is available within approximately 3-5   hours either by Blood Panel Multiplexed PCR or Direct   MALDI-TOF. Details: [email or web address]  Organism Identification: Blood Culture PCR   Gram Negative Rods  Method Type: TAMMY  Method Type: PCR    .Blood BLOOD  12-20-24   Growth in aerobic and anaerobic bottles: Pantoea dispersa  Direct identification is available within approximately 3-5  hours either by Blood Panel Multiplexed PCR or Direct  MALDI-TOF. Details: [email or web address]  --  Blood Culture PCR      .Blood BLOOD  12-17-24   No growth at 5 days  --  --      .Blood BLOOD  12-17-24   No growth at 5 days  --  --          Rapid RVP Result: NotDetec (12-18 @ 10:09)        RADIOLOGY:    < from: VA Duplex Venous Upper Ext Complete, Bilateral (12.31.24 @ 11:37) >  CONCLUSIONS:      1. Hyperechoic material noted along the walls of the right internal jugular vein, suggestive of non-occlusive deep vein thrombosis. The vessel was not well visualized.   2. Age-indeterminate, non-occlusive deep vein thrombosis notedwithin the left brachial vein.   3. Superficial vein thromboses noted with the right and left cephalic veins.    < end of copied text >

## 2024-12-31 NOTE — PROGRESS NOTE ADULT - TIME BILLING
Review of laboratory data, radiology results, consultant recommendations, EMR documentation, discussion with patient/advanced care providers and interdisciplinary staff.
- Ordering, reviewing, and interpreting labs, testing, and imaging.  - Independently obtaining a review of systems and performing a physical exam  - Reviewing consultant documentation/recommendations in addition to discussing plan of care with consultants.  - Counselling and educating patient and family regarding interpretation of aforementioned items and plan of care.
Review of laboratory data, radiology results, consultant recommendations, EMR documentation, discussion with patient/advanced care providers and interdisciplinary staff.
- Ordering, reviewing, and interpreting labs, testing, and imaging.  - Independently obtaining a review of systems and performing a physical exam  - Reviewing consultant documentation/recommendations in addition to discussing plan of care with consultants.  - Counselling and educating patient and family regarding interpretation of aforementioned items and plan of care.

## 2024-12-31 NOTE — PROGRESS NOTE ADULT - PROBLEM SELECTOR PLAN 1
Fever to 101.2 at 21:35 on 12/17/24. Reported to have O2 sat in 80s With leukocytosis. focal infectious symptoms. Recent staph epi bacteremia during November admission here, seen by ID at that time. Patient has port in place,   - blood culture with Enterobacterales and pantoea, ID reconsulted, rec to switch meropenem (12/20-12/24) to ertapenem 1 gm q24h (12/24 - ), blood cultures from 12/26 still showing pantoea, repeat blood Cx on 12/28 NGTD  - IR consulted - s/p mediport [email or web address]  -repeat blood cx 12/31 pending Fever to 101.2 at 21:35 on 12/17/24. Reported to have O2 sat in 80s With leukocytosis. focal infectious symptoms. Recent staph epi bacteremia during November admission here, seen by ID at that time. Patient has port in place,   - blood culture with Enterobacterales and pantoea, ID reconsulted, rec to switch meropenem (12/20-12/24) to ertapenem 1 gm q24h (12/24 - ), blood cultures from 12/26 still showing pantoea, repeat blood Cx on 12/28 NGTD  - IR consulted - s/p mediport [email or web address]  -repeat blood cx 12/31 pending  - echo ordered per ID rec r/o endocarditis

## 2024-12-31 NOTE — PROGRESS NOTE ADULT - ASSESSMENT
44 year old male with history of sickle cell anemia presenting today s/p mechanical fall    Sickle cell Disease - Pain Crisis   - Follows with Dr Jordan of [facility]  - s/p 1u PRBCs 12/18/24   - Chronic hemolysis, iron overload; trying to limit transfusions - Hgb goal > 6.0  - Jadenu on hold for now, can restart at d/c.   - Maximize pain control; currently on Dilaudid PCA pump    Gram negative bacteremia  - Source unclear but noted to have rising DIRECT bilirubin   - Repeat cultures from 12/26 are positive, plan to remove port as unable to determine source of infection  - s/p port removal on 12/30/24.     Rising D-bili   - s/p MRCP which showed no obstruction, but underlying iron overload  - s/p exchange transfusion for 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; goal Hb S < 30%, Hgb 8.7, feels better  - Hepatology consulted, appreciate recs  - Hb S levels 7.7  - Medicine team appreciated  - Tbili continues to inc, now 6.2.     Hypoxia   - Mild and intermittent; previously walked and had no desaturation on ambulation and there is no evidence of infiltrate on chest x-rays or CT and no chest pain, most recent CXR without evidence of PNA but likely atelectasis - had not been using incentive spirometer - advised to use throughout the day; denies any chest pain     Urothelial lesion  CT urogram shows filling defects in the left renal collecting system  suspicious for urothelial neoplasm. stable abdominal and retroperitoneal lymphadenopathy  -Urology recs appreciated, outpatient follow up      Will cont to follow    Ramona Pedroza PA-C  Hematology/Oncology  [facility]  [phone] (Office)  Evenings and weekends please call MD on call or office           44 year old male with history of sickle cell anemia presenting today s/p mechanical fall    Sickle cell Disease - Pain Crisis   - Follows with Dr Jordan of [facility]  - s/p 1u PRBCs 12/18/24   - Chronic hemolysis, iron overload; trying to limit transfusions - Hgb goal > 6.0  - Jadenu on hold for now, can restart at d/c.   - Maximize pain control; currently on Dilaudid PCA pump    Gram negative bacteremia  - Source unclear but noted to have rising DIRECT bilirubin   - Repeat cultures from 12/26 are positive, plan to remove port as unable to determine source of infection  - s/p port removal on 12/30/24.  - monitor BC.      Rising D-bili   - s/p MRCP which showed no obstruction, but underlying iron overload  - s/p exchange transfusion for 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; goal Hb S < 30%, Hgb 8.7, feels better  - Hb S levels 7.7  - Medicine team appreciated  - Tbili continues to inc, now 6.2.   - can check indirect bilirubin.  - possibly secondary to liver disease.  - appreciate hepatology recs.    Hypoxia   - Mild and intermittent; previously walked and had no desaturation on ambulation and there is no evidence of infiltrate on chest x-rays or CT and no chest pain, most recent CXR without evidence of PNA but likely atelectasis - had not been using incentive spirometer - advised to use throughout the day; denies any chest pain     Urothelial lesion  CT urogram shows filling defects in the left renal collecting system  suspicious for urothelial neoplasm. stable abdominal and retroperitoneal lymphadenopathy  -Urology recs appreciated, outpatient follow up      Will continue to follow    Ramona Pedroza PA-C  Hematology/Oncology  [facility]  [phone] (Office)  Evenings and weekends please call MD on call or office           44 year old male with history of sickle cell anemia presenting today s/p mechanical fall    Sickle cell Disease - Pain Crisis   - Follows with Dr Jordan of [facility]  - s/p 1u PRBCs 12/18/24   - Chronic hemolysis, iron overload; trying to limit transfusions - Hgb goal > 6.0  - Jadenu on hold for now, can restart at d/c.   - Maximize pain control; currently on Dilaudid PCA pump    Gram negative bacteremia  - Source unclear but noted to have rising DIRECT bilirubin   - Repeat cultures from 12/26 are positive, plan to remove port as unable to determine source of infection  - s/p port removal on 12/30/24.  - monitor BC.      Rising D-bili   - s/p MRCP which showed no obstruction, but underlying iron overload  - s/p exchange transfusion for 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; goal Hb S < 30%, Hgb 8.7, feels better  - Hb S levels 7.7  - Medicine team appreciated  - Tbili continues to inc, now 6.2.   - can check direct bilirubin.  - possibly secondary to liver disease.  - appreciate hepatology recs.    Hypoxia   - Mild and intermittent; previously walked and had no desaturation on ambulation and there is no evidence of infiltrate on chest x-rays or CT and no chest pain, most recent CXR without evidence of PNA but likely atelectasis - had not been using incentive spirometer - advised to use throughout the day; denies any chest pain     Urothelial lesion  CT urogram shows filling defects in the left renal collecting system  suspicious for urothelial neoplasm. stable abdominal and retroperitoneal lymphadenopathy  -Urology recs appreciated, outpatient follow up      Will continue to follow    Ramona Pedroza PA-C  Hematology/Oncology  [facility]  [phone] (Office)  Evenings and weekends please call MD on call or office           44 year old male with history of sickle cell anemia presenting today s/p mechanical fall    Sickle cell Disease - Pain Crisis   - Follows with Dr Jordan of [facility]  - s/p 1u PRBCs 12/18/24   - Chronic hemolysis, iron overload; trying to limit transfusions - Hgb goal > 6.0  - Jadenu on hold for now, can restart at d/c.   - Maximize pain control; currently on Dilaudid PCA pump    Gram negative bacteremia  - Source unclear but noted to have rising DIRECT bilirubin   - Repeat cultures from 12/26 are positive, plan to remove port as unable to determine source of infection  - s/p port removal on 12/30/24.  - monitor BC.      Rising bilirubin   - s/p MRCP which showed no obstruction, but underlying iron overload  - s/p exchange transfusion for 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; goal Hb S < 30%, Hgb 8.7, feels better  - Hb S levels 7.7  - Medicine team appreciated  - Tbili continues to inc, now 6.2.   - can check direct bilirubin.  - possibly secondary to liver disease.  - appreciate hepatology recs.    Hypoxia   - Mild and intermittent; previously walked and had no desaturation on ambulation and there is no evidence of infiltrate on chest x-rays or CT and no chest pain, most recent CXR without evidence of PNA but likely atelectasis - had not been using incentive spirometer - advised to use throughout the day; denies any chest pain     Urothelial lesion  CT urogram shows filling defects in the left renal collecting system  suspicious for urothelial neoplasm. stable abdominal and retroperitoneal lymphadenopathy  -Urology recs appreciated, outpatient follow up      Will continue to follow    Ramona Pedroza PA-C  Hematology/Oncology  [facility]  [phone] (Office)  Evenings and weekends please call MD on call or office

## 2025-01-01 ENCOUNTER — RESULT REVIEW (OUTPATIENT)
Age: 45
End: 2025-01-01

## 2025-01-01 LAB
ALBUMIN SERPL ELPH-MCNC: 3.3 G/DL — SIGNIFICANT CHANGE UP (ref 3.3–5)
ALP SERPL-CCNC: 143 U/L — HIGH (ref 40–120)
ALT FLD-CCNC: 33 U/L — SIGNIFICANT CHANGE UP (ref 4–41)
ANION GAP SERPL CALC-SCNC: 10 MMOL/L — SIGNIFICANT CHANGE UP (ref 7–14)
AST SERPL-CCNC: 75 U/L — HIGH (ref 4–40)
BASOPHILS # BLD AUTO: 0.12 K/UL — SIGNIFICANT CHANGE UP (ref 0–0.2)
BASOPHILS NFR BLD AUTO: 1.5 % — SIGNIFICANT CHANGE UP (ref 0–2)
BILIRUB SERPL-MCNC: 4.9 MG/DL — HIGH (ref 0.2–1.2)
BUN SERPL-MCNC: 20 MG/DL — SIGNIFICANT CHANGE UP (ref 7–23)
CALCIUM SERPL-MCNC: 9.2 MG/DL — SIGNIFICANT CHANGE UP (ref 8.4–10.5)
CHLORIDE SERPL-SCNC: 100 MMOL/L — SIGNIFICANT CHANGE UP (ref 98–107)
CO2 SERPL-SCNC: 23 MMOL/L — SIGNIFICANT CHANGE UP (ref 22–31)
CREAT SERPL-MCNC: 0.79 MG/DL — SIGNIFICANT CHANGE UP (ref 0.5–1.3)
EGFR: 112 ML/MIN/1.73M2 — SIGNIFICANT CHANGE UP
EOSINOPHIL # BLD AUTO: 0.18 K/UL — SIGNIFICANT CHANGE UP (ref 0–0.5)
EOSINOPHIL NFR BLD AUTO: 2.2 % — SIGNIFICANT CHANGE UP (ref 0–6)
GLUCOSE SERPL-MCNC: 94 MG/DL — SIGNIFICANT CHANGE UP (ref 70–99)
HAPTOGLOB SERPL-MCNC: <20 MG/DL — LOW (ref 34–200)
HCT VFR BLD CALC: 25.8 % — LOW (ref 39–50)
HGB BLD-MCNC: 8.5 G/DL — LOW (ref 13–17)
IANC: 3.85 K/UL — SIGNIFICANT CHANGE UP (ref 1.8–7.4)
IMM GRANULOCYTES NFR BLD AUTO: 0.5 % — SIGNIFICANT CHANGE UP (ref 0–0.9)
INR BLD: 1.16 RATIO — SIGNIFICANT CHANGE UP (ref 0.85–1.16)
LDH SERPL L TO P-CCNC: 503 U/L — HIGH (ref 135–225)
LYMPHOCYTES # BLD AUTO: 3.06 K/UL — SIGNIFICANT CHANGE UP (ref 1–3.3)
LYMPHOCYTES # BLD AUTO: 37.4 % — SIGNIFICANT CHANGE UP (ref 13–44)
MAGNESIUM SERPL-MCNC: 1.7 MG/DL — SIGNIFICANT CHANGE UP (ref 1.6–2.6)
MCHC RBC-ENTMCNC: 29.1 PG — SIGNIFICANT CHANGE UP (ref 27–34)
MCHC RBC-ENTMCNC: 32.9 G/DL — SIGNIFICANT CHANGE UP (ref 32–36)
MCV RBC AUTO: 88.4 FL — SIGNIFICANT CHANGE UP (ref 80–100)
MONOCYTES # BLD AUTO: 0.94 K/UL — HIGH (ref 0–0.9)
MONOCYTES NFR BLD AUTO: 11.5 % — SIGNIFICANT CHANGE UP (ref 2–14)
NEUTROPHILS # BLD AUTO: 3.85 K/UL — SIGNIFICANT CHANGE UP (ref 1.8–7.4)
NEUTROPHILS NFR BLD AUTO: 46.9 % — SIGNIFICANT CHANGE UP (ref 43–77)
NRBC # BLD: 0 /100 WBCS — SIGNIFICANT CHANGE UP (ref 0–0)
NRBC # FLD: 0 K/UL — SIGNIFICANT CHANGE UP (ref 0–0)
PHOSPHATE SERPL-MCNC: 3.3 MG/DL — SIGNIFICANT CHANGE UP (ref 2.5–4.5)
PLATELET # BLD AUTO: 525 K/UL — HIGH (ref 150–400)
POTASSIUM SERPL-MCNC: 4.3 MMOL/L — SIGNIFICANT CHANGE UP (ref 3.5–5.3)
POTASSIUM SERPL-SCNC: 4.3 MMOL/L — SIGNIFICANT CHANGE UP (ref 3.5–5.3)
PROT SERPL-MCNC: 7.1 G/DL — SIGNIFICANT CHANGE UP (ref 6–8.3)
PROTHROM AB SERPL-ACNC: 13.4 SEC — SIGNIFICANT CHANGE UP (ref 9.9–13.4)
RBC # BLD: 2.92 M/UL — LOW (ref 4.2–5.8)
RBC # BLD: 2.92 M/UL — LOW (ref 4.2–5.8)
RBC # FLD: 17.9 % — HIGH (ref 10.3–14.5)
RETICS #: 57.8 K/UL — SIGNIFICANT CHANGE UP (ref 25–125)
RETICS/RBC NFR: 2 % — SIGNIFICANT CHANGE UP (ref 0.5–2.5)
SODIUM SERPL-SCNC: 133 MMOL/L — LOW (ref 135–145)
WBC # BLD: 8.19 K/UL — SIGNIFICANT CHANGE UP (ref 3.8–10.5)
WBC # FLD AUTO: 8.19 K/UL — SIGNIFICANT CHANGE UP (ref 3.8–10.5)

## 2025-01-01 PROCEDURE — 93306 TTE W/DOPPLER COMPLETE: CPT | Mod: 26

## 2025-01-01 PROCEDURE — 99232 SBSQ HOSP IP/OBS MODERATE 35: CPT

## 2025-01-01 RX ADMIN — NIFEDIPINE 30 MILLIGRAM(S): 60 TABLET, EXTENDED RELEASE ORAL at 05:04

## 2025-01-01 RX ADMIN — Medication 30 MILLILITER(S): at 19:32

## 2025-01-01 RX ADMIN — APIXABAN 10 MILLIGRAM(S): 5 TABLET, FILM COATED ORAL at 17:19

## 2025-01-01 RX ADMIN — APIXABAN 10 MILLIGRAM(S): 5 TABLET, FILM COATED ORAL at 05:04

## 2025-01-01 RX ADMIN — Medication 1 MILLIGRAM(S): at 11:20

## 2025-01-01 RX ADMIN — ALLOPURINOL 50 MILLIGRAM(S): 100 TABLET ORAL at 11:20

## 2025-01-01 RX ADMIN — URSODIOL 500 MILLIGRAM(S): 250 TABLET, FILM COATED ORAL at 05:04

## 2025-01-01 RX ADMIN — CHLORHEXIDINE GLUCONATE 1 APPLICATION(S): 1.2 RINSE ORAL at 11:21

## 2025-01-01 RX ADMIN — Medication 30 MILLILITER(S): at 07:21

## 2025-01-01 RX ADMIN — SODIUM CHLORIDE 30 MILLILITER(S): 9 INJECTION, SOLUTION INTRAVENOUS at 07:22

## 2025-01-01 RX ADMIN — SENNOSIDES 2 TABLET(S): 8.6 TABLET, FILM COATED ORAL at 21:11

## 2025-01-01 RX ADMIN — URSODIOL 500 MILLIGRAM(S): 250 TABLET, FILM COATED ORAL at 17:19

## 2025-01-01 RX ADMIN — PANTOPRAZOLE 40 MILLIGRAM(S): 40 TABLET, DELAYED RELEASE ORAL at 05:05

## 2025-01-01 NOTE — PROGRESS NOTE ADULT - SUBJECTIVE AND OBJECTIVE BOX
[facility] Division of Hospital Medicine  Martine Iglesias MD  Pager [phone]    Patient is a 44y old  Male who presents with a chief complaint of mech fall, pain, dyspnea       SUBJECTIVE / OVERNIGHT EVENTS: pt seen earlier this AM; c/o RUE pain from swelling; hot packs given      MEDICATIONS  (STANDING):  allopurinol 50 milliGRAM(s) Oral daily  apixaban 10 milliGRAM(s) Oral every 12 hours  calcium gluconate IVPB 2 Gram(s) IV Intermittent once  chlorhexidine 2% Cloths 1 Application(s) Topical daily  folic acid 1 milliGRAM(s) Oral every 24 hours  HYDROmorphone PCA (5 mG/mL) 30 milliLiter(s) PCA Continuous PCA Continuous  influenza   Vaccine 0.5 milliLiter(s) IntraMuscular once  NIFEdipine XL 30 milliGRAM(s) Oral daily  pantoprazole    Tablet 40 milliGRAM(s) Oral before breakfast  senna 2 Tablet(s) Oral at bedtime  sodium chloride 0.45%. 1000 milliLiter(s) (30 mL/Hr) IV Continuous <Continuous>  ursodiol Tablet 500 milliGRAM(s) Oral two times a day    MEDICATIONS  (PRN):  naloxone Injectable 0.1 milliGRAM(s) IV Push once PRN Opioid Overdose  ondansetron Injectable 4 milliGRAM(s) IV Push every 8 hours PRN Nausea and/or Vomiting      PHYSICAL EXAM:  Vital Signs Last 24 Hrs  T(F): 98.3 (01 Jan 2025 09:50), Max: 98.5 (31 Dec 2024 17:00)  HR: 80 (01 Jan 2025 09:50) (80 - 90)  BP: 129/82 (01 Jan 2025 09:50) (117/76 - 149/86)  RR: 18 (01 Jan 2025 09:50) (17 - 18)  SpO2: 97% (01 Jan 2025 09:50) (95% - 98%)    Parameters below as of 01 Jan 2025 09:50  Patient On (Oxygen Delivery Method): room air        CONSTITUTIONAL: NAD, appears comfortable  EYES: PERRLA; conjunctiva and sclera clear  ENMT: Moist oral mucosa; normal dentition  RESPIRATORY: Normal respiratory effort; lungs are clear to auscultation bilaterally  CARDIOVASCULAR: Regular rate and rhythm; No lower extremity edema; RUE + edema, tender  ABDOMEN: Nontender to palpation, normoactive bowel sounds  MUSCULOSKELETAL: no clubbing or cyanosis of digits; no joint swelling or tenderness to palpation  PSYCH: A+O to person, place, and time; affect appropriate  NEUROLOGY: CN 2-12 are intact and symmetric; no gross sensory deficits   SKIN: No rashes; no palpable lesions    LABS:                        8.5    8.19  )-----------( 525      ( 01 Jan 2025 05:29 )             25.8     01-01    133[L]  |  100  |  20  ----------------------------<  94  4.3   |  23  |  0.79    Ca    9.2      01 Jan 2025 05:29  Phos  3.3     01-01  Mg     1.70     01-01    TPro  7.1  /  Alb  3.3  /  TBili  4.9[H]  /  DBili  x   /  AST  75[H]  /  ALT  33  /  AlkPhos  143[H]  01-01    PT/INR - ( 01 Jan 2025 05:29 )   PT: 13.4 sec;   INR: 1.16 ratio

## 2025-01-01 NOTE — PROGRESS NOTE ADULT - ASSESSMENT
44 year old male with history of sickle cell anemia presenting today s/p mechanical fall    Sickle cell Disease - Pain Crisis   - Follows with Dr Jordan of [facility]  - s/p 1u PRBCs 12/18/24   - Chronic hemolysis, iron overload; trying to limit transfusions - Hgb goal > 6.0  - Jadenu on hold for now, can restart at d/c.   - Maximize pain control; currently on Dilaudid PCA pump    Gram negative bacteremia  - Source unclear but noted to have rising DIRECT bilirubin   - Repeat cultures from 12/26 are positive, plan to remove port as unable to determine source of infection  - s/p port removal on 12/30/24.  - monitor BC.      Rising bilirubin   - s/p MRCP which showed no obstruction, but underlying iron overload  - s/p exchange transfusion for 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; goal Hb S < 30%, Hgb 8.7, feels better  - Hb S levels 7.7  - Medicine team appreciated  - Tbili continues to inc, now 6.2.   - can check direct bilirubin.  - possibly secondary to liver disease.  - appreciate hepatology recs.    Hypoxia   - Mild and intermittent; previously walked and had no desaturation on ambulation and there is no evidence of infiltrate on chest x-rays or CT and no chest pain, most recent CXR without evidence of PNA but likely atelectasis - had not been using incentive spirometer - advised to use throughout the day; denies any chest pain     Urothelial lesion  CT urogram shows filling defects in the left renal collecting system  suspicious for urothelial neoplasm. stable abdominal and retroperitoneal lymphadenopathy  -Urology recs appreciated, outpatient follow up    DVT left brachial vein and right IJ   cont with eliquis    Will continue to follow

## 2025-01-01 NOTE — PROGRESS NOTE ADULT - PROBLEM SELECTOR PLAN 1
Fever to 101.2 at 21:35 on 12/17/24. Reported to have O2 sat in 80s With leukocytosis. focal infectious symptoms. Recent staph epi bacteremia during November admission here, seen by ID at that time. Patient has port in place,   - blood culture with Enterobacterales and pantoea, ID reconsulted, rec to switch meropenem (12/20-12/24) to ertapenem 1 gm q24h (12/24 - ), blood cultures from 12/26 still showing pantoea, repeat blood Cx on 12/28 NGTD  - IR consulted - s/p mediport [email or web address]  -repeat blood cx 12/31 pending  - echo ordered per ID rec r/o endocarditis

## 2025-01-01 NOTE — PROGRESS NOTE ADULT - PROBLEM SELECTOR PLAN 2
Pt spiking intermittent fever, new CXR showed opacities ( neg CXR x 3 in the past few days), high suspicion for acute chest,   Patient reports hgb baseline is ~ 5.5  - Discussed with Mayuri, s/p exhange transfusion 12/26 given high suspicion for acute chest syndrome and liver involvement; f/u Hgb Electrophoresis shows HbS: 10.6% (goal <30%); s/p port study 12/26 (port intact, continue to use)  - C/w dilaudid PCA, monitor pain, will gradually wean pt off PCA; pt takes Oxycodone 30 mg PRN at home  - Bowel regimen with Senna daily, miralax prn  - Patient follows Dr Jordan of [facility] - appreciate hem/onc input, s/p 2 unit pRBC this admission. Trend Hgb.   - Pt on Jadenu at home, discussed with Mayuri onc, hold at this time, plan to resume at time of discharge.

## 2025-01-01 NOTE — PROGRESS NOTE ADULT - SUBJECTIVE AND OBJECTIVE BOX
Patient is a 44y old  Male [facility] a [facility] (31 Dec 2024 09:36)    Patient seen at bedside today.  Port removed.  Is feeling well.     MEDICATIONS  (STANDING):  allopurinol 50 milliGRAM(s) Oral daily  calcium gluconate IVPB 2 Gram(s) IV Intermittent once  chlorhexidine 2% Cloths 1 Application(s) Topical daily  enoxaparin Injectable 40 milliGRAM(s) SubCutaneous every 24 hours  ertapenem  IVPB 1000 milliGRAM(s) IV Intermittent every 24 hours  folic acid 1 milliGRAM(s) Oral every 24 hours  HYDROmorphone PCA (5 mG/mL) 30 milliLiter(s) PCA Continuous PCA Continuous  influenza   Vaccine 0.5 milliLiter(s) IntraMuscular once  NIFEdipine XL 30 milliGRAM(s) Oral daily  pantoprazole    Tablet 40 milliGRAM(s) Oral before breakfast  senna 2 Tablet(s) Oral at bedtime  sodium chloride 0.45%. 1000 milliLiter(s) (30 mL/Hr) IV Continuous <Continuous>  ursodiol Tablet 500 milliGRAM(s) Oral two times a day    MEDICATIONS  (PRN):  naloxone Injectable 0.1 milliGRAM(s) IV Push once PRN Opioid Overdose  ondansetron Injectable 4 milliGRAM(s) IV Push every 8 hours PRN Nausea and/or Vomiting      ROS  No fever, sweats, chills      Vital Signs Last 24 Hrs  T(C): 36.8 (01 Jan 2025 09:50), Max: 36.9 (31 Dec 2024 17:00)  T(F): 98.3 (01 Jan 2025 09:50), Max: 98.5 (31 Dec 2024 17:00)  HR: 80 (01 Jan 2025 09:50) (80 - 90)  BP: 129/82 (01 Jan 2025 09:50) (117/76 - 149/86)  BP(mean): --  RR: 18 (01 Jan 2025 09:50) (17 - 18)  SpO2: 97% (01 Jan 2025 09:50) (95% - 98%)    Parameters below as of 01 Jan 2025 09:50  Patient On (Oxygen Delivery Method): room air        PE  NAD  Awake, alert                                                   8.5    8.19  )-----------( 525      ( 01 Jan 2025 05:29 )             25.8   01-01    133[L]  |  100  |  20  ----------------------------<  94  4.3   |  23  |  0.79    Ca    9.2      01 Jan 2025 05:29  Phos  3.3     01-01  Mg     1.70     01-01    TPro  7.1  /  Alb  3.3  /  TBili  4.9[H]  /  DBili  x   /  AST  75[H]  /  ALT  33  /  AlkPhos  143[H]  01-01

## 2025-01-02 LAB
ALBUMIN SERPL ELPH-MCNC: 3.1 G/DL — LOW (ref 3.3–5)
ALP SERPL-CCNC: 127 U/L — HIGH (ref 40–120)
ALT FLD-CCNC: 25 U/L — SIGNIFICANT CHANGE UP (ref 4–41)
ANION GAP SERPL CALC-SCNC: 13 MMOL/L — SIGNIFICANT CHANGE UP (ref 7–14)
AST SERPL-CCNC: 58 U/L — HIGH (ref 4–40)
BASOPHILS # BLD AUTO: 0.11 K/UL — SIGNIFICANT CHANGE UP (ref 0–0.2)
BASOPHILS NFR BLD AUTO: 1.5 % — SIGNIFICANT CHANGE UP (ref 0–2)
BILIRUB SERPL-MCNC: 3.7 MG/DL — HIGH (ref 0.2–1.2)
BUN SERPL-MCNC: 22 MG/DL — SIGNIFICANT CHANGE UP (ref 7–23)
CALCIUM SERPL-MCNC: 8.7 MG/DL — SIGNIFICANT CHANGE UP (ref 8.4–10.5)
CHLORIDE SERPL-SCNC: 100 MMOL/L — SIGNIFICANT CHANGE UP (ref 98–107)
CO2 SERPL-SCNC: 20 MMOL/L — LOW (ref 22–31)
CREAT SERPL-MCNC: 0.77 MG/DL — SIGNIFICANT CHANGE UP (ref 0.5–1.3)
CULTURE RESULTS: SIGNIFICANT CHANGE UP
CULTURE RESULTS: SIGNIFICANT CHANGE UP
EGFR: 113 ML/MIN/1.73M2 — SIGNIFICANT CHANGE UP
EOSINOPHIL # BLD AUTO: 0.18 K/UL — SIGNIFICANT CHANGE UP (ref 0–0.5)
EOSINOPHIL NFR BLD AUTO: 2.4 % — SIGNIFICANT CHANGE UP (ref 0–6)
GLUCOSE SERPL-MCNC: 122 MG/DL — HIGH (ref 70–99)
HAPTOGLOB SERPL-MCNC: <20 MG/DL — LOW (ref 34–200)
HCT VFR BLD CALC: 21.3 % — LOW (ref 39–50)
HGB BLD-MCNC: 7.1 G/DL — LOW (ref 13–17)
IANC: 3.05 K/UL — SIGNIFICANT CHANGE UP (ref 1.8–7.4)
IMM GRANULOCYTES NFR BLD AUTO: 0.5 % — SIGNIFICANT CHANGE UP (ref 0–0.9)
LDH SERPL L TO P-CCNC: 430 U/L — HIGH (ref 135–225)
LYMPHOCYTES # BLD AUTO: 2.87 K/UL — SIGNIFICANT CHANGE UP (ref 1–3.3)
LYMPHOCYTES # BLD AUTO: 38.8 % — SIGNIFICANT CHANGE UP (ref 13–44)
MAGNESIUM SERPL-MCNC: 1.6 MG/DL — SIGNIFICANT CHANGE UP (ref 1.6–2.6)
MCHC RBC-ENTMCNC: 29.1 PG — SIGNIFICANT CHANGE UP (ref 27–34)
MCHC RBC-ENTMCNC: 33.3 G/DL — SIGNIFICANT CHANGE UP (ref 32–36)
MCV RBC AUTO: 87.3 FL — SIGNIFICANT CHANGE UP (ref 80–100)
MONOCYTES # BLD AUTO: 1.15 K/UL — HIGH (ref 0–0.9)
MONOCYTES NFR BLD AUTO: 15.5 % — HIGH (ref 2–14)
NEUTROPHILS # BLD AUTO: 3.05 K/UL — SIGNIFICANT CHANGE UP (ref 1.8–7.4)
NEUTROPHILS NFR BLD AUTO: 41.3 % — LOW (ref 43–77)
NRBC # BLD: 0 /100 WBCS — SIGNIFICANT CHANGE UP (ref 0–0)
NRBC # FLD: 0.02 K/UL — HIGH (ref 0–0)
PHOSPHATE SERPL-MCNC: 3.4 MG/DL — SIGNIFICANT CHANGE UP (ref 2.5–4.5)
PLATELET # BLD AUTO: 507 K/UL — HIGH (ref 150–400)
POTASSIUM SERPL-MCNC: 4.1 MMOL/L — SIGNIFICANT CHANGE UP (ref 3.5–5.3)
POTASSIUM SERPL-SCNC: 4.1 MMOL/L — SIGNIFICANT CHANGE UP (ref 3.5–5.3)
PROT SERPL-MCNC: 6.5 G/DL — SIGNIFICANT CHANGE UP (ref 6–8.3)
RBC # BLD: 2.44 M/UL — LOW (ref 4.2–5.8)
RBC # BLD: 2.44 M/UL — LOW (ref 4.2–5.8)
RBC # FLD: 17.7 % — HIGH (ref 10.3–14.5)
RETICS #: 41.3 K/UL — SIGNIFICANT CHANGE UP (ref 25–125)
RETICS/RBC NFR: 1.7 % — SIGNIFICANT CHANGE UP (ref 0.5–2.5)
SODIUM SERPL-SCNC: 133 MMOL/L — LOW (ref 135–145)
SPECIMEN SOURCE: SIGNIFICANT CHANGE UP
SPECIMEN SOURCE: SIGNIFICANT CHANGE UP
WBC # BLD: 7.4 K/UL — SIGNIFICANT CHANGE UP (ref 3.8–10.5)
WBC # FLD AUTO: 7.4 K/UL — SIGNIFICANT CHANGE UP (ref 3.8–10.5)

## 2025-01-02 PROCEDURE — 99232 SBSQ HOSP IP/OBS MODERATE 35: CPT

## 2025-01-02 PROCEDURE — 93010 ELECTROCARDIOGRAM REPORT: CPT

## 2025-01-02 PROCEDURE — G0545: CPT

## 2025-01-02 RX ORDER — ERTAPENEM SODIUM 1 G/1
1000 INJECTION, POWDER, LYOPHILIZED, FOR SOLUTION INTRAMUSCULAR; INTRAVENOUS EVERY 24 HOURS
Refills: 0 | Status: DISCONTINUED | OUTPATIENT
Start: 2025-01-03 | End: 2025-01-03

## 2025-01-02 RX ORDER — ERTAPENEM SODIUM 1 G/1
INJECTION, POWDER, LYOPHILIZED, FOR SOLUTION INTRAMUSCULAR; INTRAVENOUS
Refills: 0 | Status: DISCONTINUED | OUTPATIENT
Start: 2025-01-02 | End: 2025-01-03

## 2025-01-02 RX ORDER — HYDROMORPHONE HCL 4 MG
30 TABLET ORAL
Refills: 0 | Status: DISCONTINUED | OUTPATIENT
Start: 2025-01-02 | End: 2025-01-03

## 2025-01-02 RX ORDER — OXYCODONE HCL 15 MG
30 TABLET ORAL EVERY 4 HOURS
Refills: 0 | Status: DISCONTINUED | OUTPATIENT
Start: 2025-01-02 | End: 2025-01-02

## 2025-01-02 RX ORDER — OXYCODONE HCL 15 MG
30 TABLET ORAL ONCE
Refills: 0 | Status: DISCONTINUED | OUTPATIENT
Start: 2025-01-02 | End: 2025-01-02

## 2025-01-02 RX ORDER — ERTAPENEM SODIUM 1 G/1
1000 INJECTION, POWDER, LYOPHILIZED, FOR SOLUTION INTRAMUSCULAR; INTRAVENOUS ONCE
Refills: 0 | Status: COMPLETED | OUTPATIENT
Start: 2025-01-02 | End: 2025-01-02

## 2025-01-02 RX ADMIN — APIXABAN 10 MILLIGRAM(S): 5 TABLET, FILM COATED ORAL at 17:11

## 2025-01-02 RX ADMIN — Medication 30 MILLILITER(S): at 21:18

## 2025-01-02 RX ADMIN — Medication 30 MILLIGRAM(S): at 16:27

## 2025-01-02 RX ADMIN — Medication 30 MILLILITER(S): at 16:55

## 2025-01-02 RX ADMIN — CHLORHEXIDINE GLUCONATE 1 APPLICATION(S): 1.2 RINSE ORAL at 11:18

## 2025-01-02 RX ADMIN — PANTOPRAZOLE 40 MILLIGRAM(S): 40 TABLET, DELAYED RELEASE ORAL at 05:42

## 2025-01-02 RX ADMIN — NIFEDIPINE 30 MILLIGRAM(S): 60 TABLET, EXTENDED RELEASE ORAL at 05:42

## 2025-01-02 RX ADMIN — URSODIOL 500 MILLIGRAM(S): 250 TABLET, FILM COATED ORAL at 05:42

## 2025-01-02 RX ADMIN — SODIUM CHLORIDE 30 MILLILITER(S): 9 INJECTION, SOLUTION INTRAVENOUS at 21:20

## 2025-01-02 RX ADMIN — Medication 30 MILLIGRAM(S): at 17:04

## 2025-01-02 RX ADMIN — Medication 30 MILLILITER(S): at 08:15

## 2025-01-02 RX ADMIN — Medication 1 MILLIGRAM(S): at 11:17

## 2025-01-02 RX ADMIN — APIXABAN 10 MILLIGRAM(S): 5 TABLET, FILM COATED ORAL at 05:43

## 2025-01-02 RX ADMIN — URSODIOL 500 MILLIGRAM(S): 250 TABLET, FILM COATED ORAL at 17:11

## 2025-01-02 RX ADMIN — ALLOPURINOL 50 MILLIGRAM(S): 100 TABLET ORAL at 11:17

## 2025-01-02 RX ADMIN — ERTAPENEM SODIUM 120 MILLIGRAM(S): 1 INJECTION, POWDER, LYOPHILIZED, FOR SOLUTION INTRAMUSCULAR; INTRAVENOUS at 11:28

## 2025-01-02 RX ADMIN — SODIUM CHLORIDE 30 MILLILITER(S): 9 INJECTION, SOLUTION INTRAVENOUS at 16:58

## 2025-01-02 NOTE — PROGRESS NOTE ADULT - PROBLEM SELECTOR PROBLEM 3
Attn Karolina
MATA (acute kidney injury)
SCHEDULE SHOWS CRISTIANE 
MATA (acute kidney injury)
Acute respiratory failure with hypoxia
MATA (acute kidney injury)
Acute respiratory failure with hypoxia
MATA (acute kidney injury)
Acute DVT (deep venous thrombosis)
Acute respiratory failure with hypoxia
MATA (acute kidney injury)
MATA (acute kidney injury)
Acute respiratory failure with hypoxia
Acute respiratory failure with hypoxia
Acute DVT (deep venous thrombosis)
Acute respiratory failure with hypoxia
MATA (acute kidney injury)
Acute respiratory failure with hypoxia
MATA (acute kidney injury)
Acute DVT (deep venous thrombosis)
Acute respiratory failure with hypoxia

## 2025-01-02 NOTE — PROGRESS NOTE ADULT - ASSESSMENT
44 year old male with history of sickle cell anemia presenting today s/p mechanical fall    Sickle cell Disease - Pain Crisis   - Follows with Dr Jordan of [facility]  - s/p 1u PRBCs 12/18/24   - Chronic hemolysis, iron overload; trying to limit transfusions - Hgb goal > 6.0  - Jadenu on hold for now, can restart at d/c.   - Maximize pain control; currently on Dilaudid PCA pump    Gram negative bacteremia  - Source unclear but noted to have rising DIRECT bilirubin , now improved  - Repeat cultures from 12/26 are positive, plan to remove port as unable to determine source of infection  - s/p port removal on 12/30/24.  - monitor BC.      Rising bilirubin   - s/p MRCP which showed no obstruction, but underlying iron overload  - s/p exchange transfusion for 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; goal Hb S < 30%, Hgb 8.7, feels better  - Hb S levels 7.7  - Medicine team appreciated  - Tbili continues to inc, now 6.2.   - can check direct bilirubin.  - possibly secondary to liver disease.  - appreciate hepatology recs.    Hypoxia   - Mild and intermittent; previously walked and had no desaturation on ambulation and there is no evidence of infiltrate on chest x-rays or CT and no chest pain, most recent CXR without evidence of PNA but likely atelectasis - had not been using incentive spirometer - advised to use throughout the day; denies any chest pain     Urothelial lesion  CT urogram shows filling defects in the left renal collecting system  suspicious for urothelial neoplasm. stable abdominal and retroperitoneal lymphadenopathy  -Urology recs appreciated, outpatient follow up    DVT left brachial vein and right IJ   cont with eliquis    Will continue to follow    Meghana Real NP  Hematology/Oncology  [facility]  [phone] (Office)  [phone] (Alt office)  Evenings and weekends please call MD on call or office

## 2025-01-02 NOTE — PROVIDER CONTACT NOTE (OTHER) - ASSESSMENT
Port barely flushing with a lot of resistance. Poor blood return
Denies chest pain, SOB, headache, lightheadedness.
Patient states they feel pain in the arm where IV is. Feels cool to touch and tender.
Patient A&Ox4, speech clear and spontaneous. Frequent yawning noticed. Family at bedside. As per patient he had 2 bowel movements since the evening. He noticed the stool is getting softer than before but not diarrhea. Vitals as per flowsheet
Patient A&Ox4, speech clear and spontaneous. PCA pump q4 check, Patient received his total 30mg of dilaudid through PCA pump. Patient is complaining anxious and showing jitteriness. Patient sitting at the edge of the bed shaking his legs and hands.
Patient A&Ox4, speech clear and spontaneous. Patient very hardstick, unable to obtain labs peripherally. Patient is asking to use the port for blood draw. No provider to RN okay to use port for blood draw. ACP notified.
Patient denies dizziness, lightheadedness, or visual changes
Patient is A&O4, denies dizziness or lightheadedness
Pt tachy to 126bpm on tele monitor. Pt found to be febrile to 102.3F. Other VS stable. Pt endorses feeling hot but denies chest pain, lightheadedness, SOB, or any other symptoms.
Patient denies chest palpitations, dizziness, or lightheadedness
patient currently asymptomatic.
patient has right anterior chest wall port where the PCA pump was running. Resistance was met during flushing. Cathflo was ordered and admistered by ANM with nurse educator at bedside. After one hour, the port was checked, and , as per RICHARDSON Florence, resistance was still present in the port
Denies chest pain, SOB, headache, lightheadedness.
bubble at IV site, no redness, but patient reports discomfort
Patient is currently asymptomatic. Vital signs stable and in flow sheet. BMP was already resulted and potassium was supplemented
Pt. assessed at bedside during hourly rounding, visually seen bracing and comforting right upper extremity. Pt. verbalizes pain in extremity but not completely aware of source.
Patient hR sitting in 120 to 130s on tele. patient denies chest palpitations. However, patient complain of headache, warmth, and nauseous. /74  SPO 96% T99.8F.
Poor blood return and some resistance when flushing port
Poor blood return and some resistance when flushing port.
pt due for am blood cultures and labs however was unable to obtain any samples .multiple nurses and PCAs tried. provider notified.

## 2025-01-02 NOTE — PROGRESS NOTE ADULT - PROBLEM SELECTOR PROBLEM 6
Hyperbilirubinemia
Hyperbilirubinemia
Essential hypertension
Hyperbilirubinemia
Essential hypertension
Hyperbilirubinemia
Essential hypertension
Hyperbilirubinemia
Essential hypertension
Hyperbilirubinemia
Essential hypertension
Essential hypertension
Hyperbilirubinemia

## 2025-01-02 NOTE — PROGRESS NOTE ADULT - PROBLEM SELECTOR PLAN 6
Patient reports stopping hydralazine due to tachycardia  Will start metoprolol for now as /98 currently  Trend BP
Patient reports worsening jaundice, likely 2/2 sickle cell crisis  Also with mild transaminitis  Trend labs, f/u additional heme/onc recs
Patient reports stopping hydralazine due to tachycardia  Recent BP range 130-150s  c/w metop, lasix  monitor
Patient reports stopping hydralazine due to tachycardia  Will start metoprolol for now as /98 currently-147/ 85 ( max)  Trend BP
Patient reports worsening jaundice, likely 2/2 sickle cell crisis  Also with mild transaminitis  Trend labs, f/u additional heme/onc recs
Patient reports worsening jaundice, likely 2/2 sickle cell crisis  Also with mild transaminitis  Trend labs, f/u additional heme/onc recs
Patient reports stopping hydralazine due to tachycardia  Will start metoprolol for now as /98 currently  Trend BP
Patient reports stopping hydralazine due to tachycardia  BP elevated  Was started on metoprolol this admission but due to lack of cardiac comorbidities, will transition metoprolol to nifedipine XL 30mg qd for better BP control at this time  Monitor BP closely
Patient reports worsening jaundice, likely 2/2 sickle cell crisis  Also with mild transaminitis  Trend labs
Patient reports worsening jaundice, likely 2/2 sickle cell crisis  Also with mild transaminitis  Trend labs, f/u additional heme/onc recs
Patient reports stopping hydralazine due to tachycardia  Will start metoprolol for now as /98 currently  Trend BP
Patient reports worsening jaundice, likely 2/2 sickle cell crisis  Also with mild transaminitis  Trend labs, f/u additional heme/onc recs
Patient reports stopping hydralazine due to tachycardia  Will start metoprolol for now as /98 currently  Trend BP
Patient reports worsening jaundice, likely 2/2 sickle cell crisis, LFT now improving   Also with mild transaminitis  Trend labs, f/u additional heme/onc recs
Patient reports worsening jaundice, likely 2/2 sickle cell crisis  Also with mild transaminitis  Trend labs, f/u additional heme/onc recs
Patient reports stopping hydralazine due to tachycardia  Will start metoprolol for now as /98 currently  Trend BP
Patient reports worsening jaundice, likely 2/2 sickle cell crisis  Also with mild transaminitis  Trend labs, f/u additional heme/onc recs
Patient reports worsening jaundice, likely 2/2 sickle cell crisis  Also with mild transaminitis  Trend labs
Patient reports worsening jaundice, likely 2/2 sickle cell crisis  Also with mild transaminitis  Trend labs, f/u additional heme/onc recs

## 2025-01-02 NOTE — PROGRESS NOTE ADULT - PROBLEM SELECTOR PROBLEM 5
Hyperbilirubinemia
Lower extremity edema
Hyperbilirubinemia
Lower extremity edema
Hyperbilirubinemia
Hyperbilirubinemia
Lower extremity edema
Hyperbilirubinemia
Lower extremity edema
Hyperbilirubinemia
Lower extremity edema
Hyperbilirubinemia
Lower extremity edema
Hyperbilirubinemia
Lower extremity edema
Lower extremity edema

## 2025-01-02 NOTE — PROVIDER CONTACT NOTE (OTHER) - DATE AND TIME:
23-Dec-2024 20:05
30-Dec-2024 04:22
24-Dec-2024 15:32
20-Dec-2024 04:55
24-Dec-2024 08:17
25-Dec-2024 07:30
31-Dec-2024 06:57
16-Dec-2024 07:24
02-Jan-2025 18:26
16-Dec-2024 20:46
25-Dec-2024 09:15
17-Dec-2024 06:54
23-Dec-2024 03:48
23-Dec-2024 07:56
26-Dec-2024 08:30
26-Dec-2024 17:58
17-Dec-2024 04:36
21-Dec-2024 20:38
22-Dec-2024 20:56
26-Dec-2024 01:15

## 2025-01-02 NOTE — PROGRESS NOTE ADULT - PROBLEM SELECTOR PLAN 5
Presented with LE edema  LE duplex - no evidence of deep vein thrombosis in the bilateral lower extremities, subcutaneous edema noted in the bilateral lower extremities. Recent 11/2024 TTE with intact EF  s/p 1 dose of iv lasix 20mg
Presents with continued LE edema  LE duplex - no evidence of deep vein thrombosis in the bilateral lower extremities, subcutaneous edema noted in the bilateral lower extremities. Recent 11/2024 TTE with intact EF  s/p 1 dose of iv lasix 20mg
Presents with continued LE edema  LE duplex - no evidence of deep vein thrombosis in the bilateral lower extremities, subcutaneous edema noted in the bilateral lower extremities. Recent 11/2024 TTE with intact EF  creatinine at 0.98 today, will give 1 dose of iv lasix 20mg, cont to monitor creatinine
Patient reports worsening jaundice, likely 2/2 sickle cell crisis  Also with mild transaminitis  Trend labs
Patient reports worsening jaundice, likely 2/2 sickle cell crisis  Also with mild transaminitis  Trend labs
Presents with continued LE edema  LE duplex - no evidence of deep vein thrombosis in the bilateral lower extremities, subcutaneous edema noted in the bilateral lower extremities. Recent 11/2024 TTE with intact EF  creatinine at 0.98 today,   s/p 1 dose of iv lasix 20mg, will give additional dose, cont to monitor creatinine.
Presents with continued LE edema  LE duplex - no evidence of deep vein thrombosis in the bilateral lower extremities, subcutaneous edema noted in the bilateral lower extremities. Recent 11/2024 TTE with intact EF  Monitor BMP, will consider additional lasix
Presents with continued LE edema  LE duplex - no evidence of deep vein thrombosis in the bilateral lower extremities, subcutaneous edema noted in the bilateral lower extremities. Recent 11/2024 TTE with intact EF  creatinine at 0.92 today, will give 1 dose of iv lasix 20mg, cont to monitor creatinine
Presents with continued LE edema  LE duplex - no evidence of deep vein thrombosis in the bilateral lower extremities, subcutaneous edema noted in the bilateral lower extremities. Recent 11/2024 TTE with intact EF  s/p 1 dose of iv lasix 20mg, will given additional diuretics
Presents with continued LE edema  LE duplex - no evidence of deep vein thrombosis in the bilateral lower extremities, subcutaneous edema noted in the bilateral lower extremities. Recent 11/2024 TTE with intact EF  If Cr remains stable 12/21, plan to resume lasix 40mg PO qd
Presents with continued LE edema  LE duplex - no evidence of deep vein thrombosis in the bilateral lower extremities, subcutaneous edema noted in the bilateral lower extremities. Recent 11/2024 TTE with intact EF  s/p 1 dose of iv lasix 20mg, will give additional diuretics as needed
Patient reports worsening jaundice, likely 2/2 sickle cell crisis  Also with mild transaminitis  Trend labs
Presents with continued edema  Holding lasix given MATA  LE duplex - no evidence of deep vein thrombosis in the bilateral lower extremities, subcutaneous edema noted in the bilateral lower extremities. Recent 11/2024 TTE with intact EF
Patient reports worsening jaundice, likely 2/2 sickle cell crisis  Also with mild transaminitis  Trend labs
Patient reports worsening jaundice, likely 2/2 sickle cell crisis  Also with mild transaminitis  Trend labs
Presented with LE edema  LE duplex - no evidence of deep vein thrombosis in the bilateral lower extremities, subcutaneous edema noted in the bilateral lower extremities. Recent 11/2024 TTE with intact EF  s/p 1 dose of iv lasix 20mg
Patient reports worsening jaundice, likely 2/2 sickle cell crisis  Also with mild transaminitis  Trend labs
Presented with LE edema  LE duplex - no evidence of deep vein thrombosis in the bilateral lower extremities, subcutaneous edema noted in the bilateral lower extremities. Recent 11/2024 TTE with intact EF  s/p 1 dose of iv lasix 20mg
Presents with continued LE edema  LE duplex - no evidence of deep vein thrombosis in the bilateral lower extremities, subcutaneous edema noted in the bilateral lower extremities. Recent 11/2024 TTE with intact EF  s/p 1 dose of iv lasix 20mg
Presented with LE edema  LE duplex - no evidence of deep vein thrombosis in the bilateral lower extremities, subcutaneous edema noted in the bilateral lower extremities. Recent 11/2024 TTE with intact EF  s/p 1 dose of iv lasix 20mg
Presents with continued LE edema  LE duplex - no evidence of deep vein thrombosis in the bilateral lower extremities, subcutaneous edema noted in the bilateral lower extremities. Recent 11/2024 TTE with intact EF  creatinine at 0.98 today,   s/p 1 dose of iv lasix 20mg, will give additional dose, cont to monitor creatinine.
Presented with LE edema  LE duplex - no evidence of deep vein thrombosis in the bilateral lower extremities, subcutaneous edema noted in the bilateral lower extremities. Recent 11/2024 TTE with intact EF  s/p 1 dose of iv lasix 20mg

## 2025-01-02 NOTE — PROGRESS NOTE ADULT - PROBLEM SELECTOR PROBLEM 7
Preventive measure
Essential hypertension
Preventive measure
Essential hypertension
Preventive measure
Essential hypertension
Preventive measure
Essential hypertension
Preventive measure
Essential hypertension

## 2025-01-02 NOTE — PROVIDER CONTACT NOTE (OTHER) - REASON
HR sustaining in 120-130s, Oral temp of 99.8F
Iv blown
Pt sustaining sinus tach (105bpm)
patient complaining of soft stools and jitteriness
pt has a temp of 100.1F
18 beats vtach
Patient straining in bathroom tachy to 140s
Poor blood return and some resistance when flushing port
Cathflo does not work, the port still feel resistance when flushing
HR of 117, Oral temp of 100.4
Poor blood return and some resistance when flushing port
Port barely flushing with a lot of resistance. Poor blood return
was unable to get pts blood draws.
Patient hardstick unable to obtain labs
patient /83 HR84
IV hurting
Patient tachycardic and febrile
Pt febrile to 102.3 and tachy to 126 bpm at rest
Pt. complaining of right arm pain
pt has a temp of 100.1F

## 2025-01-02 NOTE — PROGRESS NOTE ADULT - SUBJECTIVE AND OBJECTIVE BOX
Follow Up:  fever    Interval History/ROS:    feels ok   anxious to go home       port removed 12/30    blood culture 12/28 no growth   blood culture 12/31 no growth x 24 h       Allergies  penicillin (Pruritus)  hydroxyurea (Other)  ceftriaxone (Anaphylaxis)  piperacillin-tazobactam (Urticaria)        ANTIMICROBIALS:  ertapenem  IVPB      MEDICATIONS  (STANDING):  allopurinol 50 daily  apixaban 10 every 12 hours  HYDROmorphone PCA (5 mG/mL) 30 PCA Continuous  influenza   Vaccine 0.5 once  NIFEdipine XL 30 daily  ondansetron Injectable 4 every 8 hours PRN  oxyCODONE    IR 30 every 4 hours PRN  pantoprazole    Tablet 40 before breakfast  senna 2 at bedtime  ursodiol Tablet 500 two times a day    Vital Signs Last 24 Hrs  T(F): 98.6 (01-02-25 @ 16:47), Max: 98.6 (01-02-25 @ 16:47)  HR: 81 (01-02-25 @ 16:47)  BP: 152/90 (01-02-25 @ 16:47)  RR: 18 (01-02-25 @ 16:47)  SpO2: 97% (01-02-25 @ 16:47) (96% - 98%)      Physical Exam:  General:    non toxic alert   Respiratory:    no respiratory distress RA  abd:     soft,    no tenderness  :  no  daniel  Musculoskeletal:   no joint swelling  vascular: R chest dressing   Skin:    no rash  iv left forearm                                    7.1    7.40  )-----------( 507      ( 02 Jan 2025 05:20 )             21.3 01-02    133  |  100  |  22  ----------------------------<  122  4.1   |  20  |  0.77  Ca    8.7      02 Jan 2025 05:20Phos  3.4     01-02Mg     1.60     01-02  TPro  6.5  /  Alb  3.1  /  TBili  3.7  /  DBili  x   /  AST  58  /  ALT  25  /  AlkPhos  127  01-02        MICROBIOLOGY:    Culture - Blood in AM (12.31.24 @ 08:40)   Specimen Source: .Blood BLOOD  Culture Results:   No growth at 48 Hours  Culture - Blood in AM (12.28.24 @ 06:03)   Specimen Source: .Blood BLOOD  Culture Results:   No growth at 5 days  Culture - Blood in AM (12.28.24 @ 05:45)   Specimen Source: .Blood BLOOD  Culture Results:   No growth at 5 days   Culture - Blood (12.26.24 @ 13:15)   Gram Stain:   Growth in anaerobic bottle: Gram Negative Rods  Specimen Source: .Blood BLOOD  Culture Results:   Growth in anaerobic bottle: Juan Luisa terria   See previous culture [id number]  Culture - Blood (12.22.24 @ 13:30)   Gram Stain:   Growth in anaerobic bottle: Gram Negative Rods  Specimen Source: .Blood BLOOD  Culture Results:   Growth in anaerobic bottle: Pantoea terria   See previous culture [id number]    Culture - Blood (12.22.24 @ 13:30)   Specimen Source: .Blood BLOOD  Culture Results:   No growth     .Blood BLOOD  12-20-24   Growth in aerobic and anaerobic bottles: Marieoea terria  See previous culture [id number]  --    Growth in aerobic bottle: Gram Negative Rods  Growth in anaerobic bottle: Gram Negative Rods  Culture - Blood (12.20.24 @ 05:39)   Gram Stain:   Growth in aerobic bottle: Gram Negative Rods   Growth in anaerobic bottle: Gram Negative Rods  - Amoxicillin/Clavulanic Acid: S <=8/4  - Ampicillin: S <=8 These ampicillin results predict results for amoxicillin  - Ampicillin/Sulbactam: S <=4/2  - Aztreonam: S <=4  - Cefazolin: R >16  - Cefepime: S <=2  - Cefotaxime: S <=2  - Cefoxitin: R >16  - Ceftazidime: S <=1  - Ceftriaxone: S <=1  - Cefuroxime: S <=4  - Ciprofloxacin: S <=0.25  - Gentamicin: S <=2  - Levofloxacin: S <=0.5  - Meropenem: S <=1  - Ertapenem: S <=0.5  - Piperacillin/Tazobactam: S <=8  - Tigecycline: S <=2  - Tobramycin: S <=2  - Trimethoprim/Sulfamethoxazole: S <=0.5/9.5  - Minocycline: S <=4  - Enterobacterales: Detec  Specimen Source: .Blood BLOOD  Organism: Blood Culture PCR  Organism: Gram Negative Rods  Culture Results:   Growth in aerobic and anaerobic bottles: Pantoea dispersa   Direct identification is available within approximately 3-5   hours either by Blood Panel Multiplexed PCR or Direct   MALDI-TOF. Details: [email or web address]  Organism Identification: Blood Culture PCR   Gram Negative Rods  Method Type: TAMMY  Method Type: PCR    .Blood BLOOD  12-20-24   Growth in aerobic and anaerobic bottles: Pantoea dispersa  Direct identification is available within approximately 3-5  hours either by Blood Panel Multiplexed PCR or Direct  MALDI-TOF. Details: [email or web address]  --  Blood Culture PCR      .Blood BLOOD  12-17-24   No growth at 5 days  --  --      .Blood BLOOD  12-17-24   No growth at 5 days  --  --          < from: TTE W or WO Ultrasound Enhancing Agent (01.01.25 @ 08:52) >  CONCLUSIONS:      1. Left ventricular systolic function is normal with an ejection fraction of 57 % by Martinez's method of disks.   2. Normal right ventricular cavity size and normal right ventricular systolic function.   3. Estimated pulmonaryartery systolic pressure is 42 mmHg, consistent with mild pulmonary hypertension.   4. There is no significant valvular regurgitation or echocardiographic evidence of endocarditis on the visualized valves of this fair quality study.   5. No pericardial effusion seen.   6. Compared to the transthoracic echocardiogram performed on 11/8/2024, no significant changes though prior study was limited evaluation for LVEF.    < end of copied text >        RADIOLOGY:    < from: VA Duplex Venous Upper Ext Complete, Bilateral (12.31.24 @ 11:37) >  CONCLUSIONS:      1. Hyperechoic material noted along the walls of the right internal jugular vein, suggestive of non-occlusive deep vein thrombosis. The vessel was not well visualized.   2. Age-indeterminate, non-occlusive deep vein thrombosis notedwithin the left brachial vein.   3. Superficial vein thromboses noted with the right and left cephalic veins.    < end of copied text >

## 2025-01-02 NOTE — PROGRESS NOTE ADULT - PROBLEM SELECTOR PROBLEM 2
Acute respiratory failure with hypoxia
Sickle cell disease
Acute respiratory failure with hypoxia
Sickle cell disease
Acute respiratory failure with hypoxia
Sickle cell disease
Acute respiratory failure with hypoxia
Sickle cell disease
Acute respiratory failure with hypoxia
Sickle cell disease

## 2025-01-02 NOTE — PROGRESS NOTE ADULT - SUBJECTIVE AND OBJECTIVE BOX
Patient is a 44y old  Male who presents with a chief complaint of mech fall, pain, dyspnea (01 Jan 2025 12:40)  No new events, port removed  Tbili improved      MEDICATIONS  (STANDING):  allopurinol 50 milliGRAM(s) Oral daily  apixaban 10 milliGRAM(s) Oral every 12 hours  calcium gluconate IVPB 2 Gram(s) IV Intermittent once  chlorhexidine 2% Cloths 1 Application(s) Topical daily  folic acid 1 milliGRAM(s) Oral every 24 hours  HYDROmorphone PCA (5 mG/mL) 30 milliLiter(s) PCA Continuous PCA Continuous  influenza   Vaccine 0.5 milliLiter(s) IntraMuscular once  NIFEdipine XL 30 milliGRAM(s) Oral daily  pantoprazole    Tablet 40 milliGRAM(s) Oral before breakfast  senna 2 Tablet(s) Oral at bedtime  sodium chloride 0.45%. 1000 milliLiter(s) (30 mL/Hr) IV Continuous <Continuous>  ursodiol Tablet 500 milliGRAM(s) Oral two times a day    MEDICATIONS  (PRN):  naloxone Injectable 0.1 milliGRAM(s) IV Push once PRN Opioid Overdose  ondansetron Injectable 4 milliGRAM(s) IV Push every 8 hours PRN Nausea and/or Vomiting        Vital Signs Last 24 Hrs  T(C): 36.7 (02 Jan 2025 05:15), Max: 36.9 (01 Jan 2025 13:50)  T(F): 98.1 (02 Jan 2025 05:15), Max: 98.4 (01 Jan 2025 13:50)  HR: 78 (02 Jan 2025 05:15) (78 - 94)  BP: 130/75 (02 Jan 2025 05:15) (114/78 - 130/75)  BP(mean): --  RR: 18 (02 Jan 2025 05:15) (18 - 18)  SpO2: 98% (02 Jan 2025 05:15) (95% - 98%)    Parameters below as of 02 Jan 2025 05:15  Patient On (Oxygen Delivery Method): room air        PE  NAD  Awake, alert  Anicteric, MMM  RRR  CTAB  Abd soft, NT, ND  No c/c/e  No rash grossly                            8.5    8.19  )-----------( 525      ( 01 Jan 2025 05:29 )             25.8       01-01    133[L]  |  100  |  20  ----------------------------<  94  4.3   |  23  |  0.79    Ca    9.2      01 Jan 2025 05:29  Phos  3.3     01-01  Mg     1.70     01-01    TPro  7.1  /  Alb  3.3  /  TBili  4.9[H]  /  DBili  x   /  AST  75[H]  /  ALT  33  /  AlkPhos  143[H]  01-01

## 2025-01-02 NOTE — PROVIDER CONTACT NOTE (OTHER) - RECOMMENDATIONS
ACP Madi Kline notified
ACP Patrizia Quintanilla notified. Care ongoing
Notify ACP
ACP Gladys Hernandez aware. Bowel regimens discontinues. Care ongoing
ACP Gladys Hernandez aware. As per ACP, will reach out hospitalist. Care ongoing
ACP Madisyn Edmondson notified
Notify ACP
remove IV
Notify ACP
Notify ACP
Provider recommendations, medication to lower temperature
Noitfy ACP
RENNY Bhagat aware. Rectal temp obtained. IV tylenol ordered and administered. PRN IVP Zofran given. Chest X-ray ordered. Care continues
ACP Piedad Barry aware. IR consult placed by ACP. Care continues
Acp virgil Flores made aware. acp suggest to have morning team try.
ACP Madi Kline notified
Notify ACP

## 2025-01-02 NOTE — PROGRESS NOTE ADULT - PROBLEM SELECTOR PLAN 3
Cr 1.8 - was as low as 1.5 last month  Patient took 2 wks of lasix as prescribed.    Will give light IVF - monitor labs  decreased to 2.33
Cr 1.8 - was as low as 1.5 last month  Patient took 2 wks of lasix as prescribed.    Will give light IVF - monitor labs  decreased to 2.33-> 2.02 ,   Hyperkalemia - correct
Cr 1.8 - was as low as 1.5 last month  Patient took 2 wks of lasix as prescribed.    Will give light IVF - monitor labs  decreased to 2.33-> 2.02 , ->1.49-> 1,25  Hyperkalemia - correct  Hypomagnesemia - replete
Per ED notes - patient hypoxic in the 80s on RA. Now on RA. Suspect 2/2 rib pain with breathing now improving.  Initial CT and multiple CXR neg, repeat CXR 12/21 +infiltrates, will keep monitoring
Per ED notes - patient hypoxic in the 80s on RA. Now on RA. Suspect 2/2 rib pain with breathing now improving. on 3L, will attempt to wean off  Initial CT and multiple CXR neg, repeat CXR 12/21 +infiltrates, will keep monitoring
Per ED notes - patient hypoxic in the 80s on RA. Now on RA. Suspect 2/2 rib pain with breathing now improving. on 3L, will attempt to wean off  Initial CT and multiple CXR neg, repeat CXR 12/21 +infiltrates, will keep monitoring
Resolved, Cr now <1  Avoid nephrotoxins  Suspect pre-renal, improved over course of hospitalization
RUE duplex + R IJ and brachial DVT  -started on Eliquis 12/31
Per ED notes - patient hypoxic in the 80s on RA. Now on RA. Suspect 2/2 rib pain with breathing now improving. on 3L, will attempt to wean off  Initial CT and multiple CXR neg, repeat CXR 12/21 +infiltrates, will keep monitoring
Per ED notes - patient hypoxic in the 80s on RA. Now on RA. Suspect 2/2 rib pain with breathing now improving.  Initial CT and multiple CXR neg, repeat CXR 12/21 +infiltrates, will keep monitoring
RUE duplex + R IJ and brachial DVT  -started on Eliquis 12/31
Per ED notes - patient hypoxic in the 80s on RA. Now on RA. Suspect 2/2 rib pain with breathing now improving. . CTA chest shows no infiltrates, negative for PE.    Breathing improved. Clear lungs on exam and clear CXR. No oxygen requirement at this time.
Resolved, Cr now around 1  Avoid nephrotoxins  Suspect pre-renal, improved over course of hospitalization
Cr 1.8 - was as low as 1.5 last month  Patient took 2 wks of lasix as prescribed.    Will give light IVF - monitor labs
Cr 1.8 - was as low as 1.5 last month  Patient took 2 wks of lasix as prescribed.    Will give light IVF - monitor labs  decreased to 2.33-> 2.02 , ->1.49  Hyperkalemia - correct
Per ED notes - patient hypoxic in the 80s on RA. Now on RA. Suspect 2/2 rib pain with breathing now improving. . CTA chest shows no infiltrates, negative for PE.    Breathing improved. Denies dyspnea. Report of desat to 80s last night but since then patient is satting well on RA with clear lungs on exam and clear CXR.
Cr 1.8 - was as low as 1.5 last month  Patient took 2 wks of lasix as prescribed.    Will give light IVF - monitor labs  decreased to 2.33-> 2.02 , ->1.49  Hyperkalemia - correct
Per ED notes - patient hypoxic in the 80s on RA. Now on RA. Suspect 2/2 rib pain with breathing now improving. . CTA chest shows no infiltrates, negative for PE.    Breathing improved. Clear lungs on exam and clear CXR. No oxygen requirement at this time.
Per ED notes - patient hypoxic in the 80s on RA. Now on RA. Suspect 2/2 rib pain with breathing now improving.  Initial CT and multiple CXR neg, repeat CXR 12/21 +infiltrates, will keep monitoring
Per ED notes - patient hypoxic in the 80s on RA. Now on RA. Suspect 2/2 rib pain with breathing now improving. . CTA chest shows no infiltrates, negative for PE.    Breathing improved. Clear lungs on exam and clear CXR. No oxygen requirement at this time.
Per ED notes - patient hypoxic in the 80s on RA. Now on RA. Suspect 2/2 rib pain with breathing now improving. on 3L, will attempt to wean off  Initial CT and multiple CXR neg, repeat CXR 12/21 +infiltrates, will keep monitoring
RUE duplex + R IJ and brachial DVT  -started on Eliquis 12/31
Per ED notes - patient hypoxic in the 80s on RA. Now on RA. Suspect 2/2 rib pain with breathing now improving. . CTA chest shows no infiltrates, negative for PE.    Breathing improved. Denies dyspnea. Report of desat to 80s last night but since then patient is satting well on RA with clear lungs on exam and clear CXR.
Per ED notes - patient hypoxic in the 80s on RA. Now on RA. Suspect 2/2 rib pain with breathing now improving.  Initial CT and multiple CXR neg, repeat CXR 12/21 +infiltrates, will keep monitoring

## 2025-01-02 NOTE — PROGRESS NOTE ADULT - PROBLEM/PLAN-6
DISPLAY PLAN FREE TEXT
51.5

## 2025-01-02 NOTE — PROGRESS NOTE ADULT - SUBJECTIVE AND OBJECTIVE BOX
[facility] Division of Hospital Medicine  Jaylan Kenney MD  Pager [phone]      Patient is a 44y old  Male who presents with a chief complaint of mech fall, pain, dyspnea (02 Jan 2025 06:25)      SUBJECTIVE / OVERNIGHT EVENTS:    no acute event o/n. reports pain is much improved    ADDITIONAL REVIEW OF SYSTEMS:    CONSTITUTIONAL: No fever, weight loss, or fatigue  EYES: No eye pain, visual disturbances, or discharge  ENMT:  No difficulty hearing, tinnitus, vertigo; No sinus or throat pain  NECK: No pain or stiffness  RESPIRATORY: No cough, wheezing, chills or hemoptysis; No shortness of breath  CARDIOVASCULAR: No chest pain, palpitations, dizziness, or leg swelling  GASTROINTESTINAL: No abdominal or epigastric pain. No nausea, vomiting, or hematemesis; No diarrhea or constipation. No melena or hematochezia.  GENITOURINARY: No dysuria, frequency, hematuria, or incontinence  NEUROLOGICAL: No headaches, memory loss, loss of strength, numbness, or tremors  SKIN: No itching, burning, rashes, or lesions   MUSCULOSKELETAL: No joint pain or swelling; No muscle, back, or extremity pain  PSYCHIATRIC: No depression, anxiety, mood swings, or difficulty sleeping    MEDICATIONS  (STANDING):  allopurinol 50 milliGRAM(s) Oral daily  apixaban 10 milliGRAM(s) Oral every 12 hours  calcium gluconate IVPB 2 Gram(s) IV Intermittent once  chlorhexidine 2% Cloths 1 Application(s) Topical daily  ertapenem  IVPB      folic acid 1 milliGRAM(s) Oral every 24 hours  HYDROmorphone PCA (5 mG/mL) 30 milliLiter(s) PCA Continuous PCA Continuous  influenza   Vaccine 0.5 milliLiter(s) IntraMuscular once  NIFEdipine XL 30 milliGRAM(s) Oral daily  pantoprazole    Tablet 40 milliGRAM(s) Oral before breakfast  senna 2 Tablet(s) Oral at bedtime  sodium chloride 0.45%. 1000 milliLiter(s) (30 mL/Hr) IV Continuous <Continuous>  ursodiol Tablet 500 milliGRAM(s) Oral two times a day    MEDICATIONS  (PRN):  naloxone Injectable 0.1 milliGRAM(s) IV Push once PRN Opioid Overdose  ondansetron Injectable 4 milliGRAM(s) IV Push every 8 hours PRN Nausea and/or Vomiting      CAPILLARY BLOOD GLUCOSE        I&O's Summary    01 Jan 2025 07:01  -  02 Jan 2025 07:00  --------------------------------------------------------  IN: 1030 mL / OUT: 2300 mL / NET: -1270 mL    02 Jan 2025 07:01  -  02 Jan 2025 16:39  --------------------------------------------------------  IN: 920 mL / OUT: 500 mL / NET: 420 mL        PHYSICAL EXAM:  Vital Signs Last 24 Hrs  T(C): 36.9 (02 Jan 2025 12:05), Max: 36.9 (02 Jan 2025 12:05)  T(F): 98.4 (02 Jan 2025 12:05), Max: 98.4 (02 Jan 2025 12:05)  HR: 82 (02 Jan 2025 12:05) (78 - 88)  BP: 127/78 (02 Jan 2025 12:05) (117/71 - 136/91)  BP(mean): --  RR: 18 (02 Jan 2025 12:05) (18 - 18)  SpO2: 97% (02 Jan 2025 12:05) (95% - 98%)    Parameters below as of 02 Jan 2025 12:05  Patient On (Oxygen Delivery Method): room air        CONSTITUTIONAL: NAD,  EYES: PERRLA; conjunctiva and sclera clear  ENMT: Moist oral mucosa, no pharyngeal injection or exudates;   NECK: Supple, no palpable masses;  RESPIRATORY: Normal respiratory effort; lungs are clear to auscultation bilaterally  CARDIOVASCULAR: Regular rate and rhythm, normal S1 and S2, no murmur/rub/gallop; No lower extremity edema; Peripheral pulses are 2+ bilaterally  ABDOMEN: Nontender to palpation, normoactive bowel sounds, no rebound/guarding;   MUSCLOSKELETAL:   no clubbing or cyanosis of digits; no joint swelling or tenderness to palpation  PSYCH: A+O to person, place, and time; affect appropriate  NEUROLOGY: CN 2-12 are intact and symmetric; no gross sensory deficits;   SKIN: No rashes;     LABS:                        7.1    7.40  )-----------( 507      ( 02 Jan 2025 05:20 )             21.3     01-02    133[L]  |  100  |  22  ----------------------------<  122[H]  4.1   |  20[L]  |  0.77    Ca    8.7      02 Jan 2025 05:20  Phos  3.4     01-02  Mg     1.60     01-02    TPro  6.5  /  Alb  3.1[L]  /  TBili  3.7[H]  /  DBili  x   /  AST  58[H]  /  ALT  25  /  AlkPhos  127[H]  01-02    PT/INR - ( 01 Jan 2025 05:29 )   PT: 13.4 sec;   INR: 1.16 ratio               Urinalysis Basic - ( 02 Jan 2025 05:20 )    Color: x / Appearance: x / SG: x / pH: x  Gluc: 122 mg/dL / Ketone: x  / Bili: x / Urobili: x   Blood: x / Protein: x / Nitrite: x   Leuk Esterase: x / RBC: x / WBC x   Sq Epi: x / Non Sq Epi: x / Bacteria: x        Culture - Blood (collected 31 Dec 2024 08:40)  Source: .Blood BLOOD  Preliminary Report (02 Jan 2025 13:01):    No growth at 48 Hours        RADIOLOGY & ADDITIONAL TESTS:  Results Reviewed:   Imaging Personally Reviewed:  Electrocardiogram Personally Reviewed:    COORDINATION OF CARE:  Care Discussed with Consultants/Other Providers [Y/N]:  Prior or Outpatient Records Reviewed [Y/N]:

## 2025-01-02 NOTE — PROVIDER CONTACT NOTE (OTHER) - BACKGROUND
pmh sickle cell anemia, gout sepsis
Pt admitted d/t shortness of breath. Pt has a PMH of gout and sickle cell anemia. Patient on PCA pump
Pt admitted d/t shortness of breath. Pt has a PMH of gout and sickle cell anemia. Patient on PCA pump
Pt admitted for sickle cell crisis
Pt admitted for sickle cell crisis. PCA pump being ran through port.
pt admitted for SCC.
Pt admitted for sickle cell crisis
Pt admitted for sickle cell crisis. PCA pump being ran through port.
Pt admitted d/t shortness of breath. Pt has a PMH of gout and sickle cell anemia. Patient on PCA pump
Pt admitted for sickle cell crisis
Pt. 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
43yo M came in 12/9 with sickle cell crisis. PCA pump dilaudid maintained for pain control. Pt has been febrile previously during this stay, blood cultures from port came back negative.
45yo M came in with sickle cell crisis. PCA pump dilaudid maintained for pain control. Pt has been febrile previously during this stay
45yo M came in with sickle cell crisis. PCA pump dilaudid maintained for pain control. Pt has been febrile previously during this stay
45yo M came in 12/9 with sickle cell crisis. PCA pump dilaudid maintained for pain control. Pt has been febrile previously during this stay, blood cultures from port came back negative.
45yo M came in with sickle cell crisis. PCA pump dilaudid maintained for pain control. Pt has been febrile previously during this stay
Patient was febrile during the day. Bcx previously sent. Empiric abx started
Pt admitted for sickle cell crisis
Pt admitted for sickle cell crisis
Clothing/Black coat

## 2025-01-02 NOTE — PROGRESS NOTE ADULT - PROBLEM SELECTOR PROBLEM 8
Preventive measure

## 2025-01-02 NOTE — PROGRESS NOTE ADULT - ASSESSMENT
44 m with sickle cell disease with a port and previous staph epi bacteremia admitted 12/9 after mechanical fall.  noted to be anemic; received a blood transfusion 12/13 and 12/18  CTA no PE  Fever 101 on 12/17  WBC increased   blood cultures 12/17 negative  fever again to 102.3 on 12/20  blood cx 12/20 with pantoa    meropenem 12/20-12/24  ertapenem 12/24-        new fever in the hospital; pantoea bacteremia 12/20    hospital acquired bacteremia    repeat blood cultures 12/22 no growth 1/2; pantoea in other after 4 days     12/26 blood culture pantoea     with persistently positive blood cultures concern for seeding port      port removed 12/30    blood cultures 12/28 no growth     blood culture 12/31 no growth to date     VA duplex 12/31  hyperechoic material walls right IJ suggestive of non -occlusive vein thrombosis     TTE no veg noted     h/o penicillin allergy    Suggest:    follow final 12/31 blood culture     with VA duplex finding of non occlusive thrombus in vein would treat with 4 weeks antibiotics (dating from first negative blood culture)    check EKG for QT if < 500 ciprofloxacin 500 mg po q 12 h is an option     will need f/u for monitoring cbc, creat and f/u blood cultures - discussed with patient

## 2025-01-02 NOTE — PROVIDER CONTACT NOTE (OTHER) - NAME OF MD/NP/PA/DO NOTIFIED:
RENNY Barry
RENNY Hernandez
ACP Madi Kline
Isaak Grover
RENNY Bhagat
RENNY Cotto
virgil Flores
Ana Rosa Roberts ACP
Madi Kline
Marcel Young
Marcel Young
RENNY Kline
RENNY Quintanilla
RENNY Kline
Marcel Young
RENNY Edmondson
RENNY Hernandez

## 2025-01-02 NOTE — PROGRESS NOTE ADULT - PROBLEM SELECTOR PLAN 4
Cr on admission 1.89, paeked at 2.46 then improved to 1. Now Cr -> 1.38 -> 1.74 -> 1.14   Continued lower extremity edema (with negative dopplers for DVT and unremarkable 11/2024 TTE with intact EF)   Appreciate Renal input and recs  Avoid further nsaids, nephrotoxins
Resolved  Cr on admission 1.89, paeked at 2.46 then improved to 1. Now Cr -> 1.38 -> 1.74 -> 1.14 -> 1.04  Continued lower extremity edema (with negative dopplers for DVT and unremarkable 11/2024 TTE with intact EF)   AVOID FURTHER NSAIDs  Appreciate Renal input and recs  creatinine at 0.92 today, will give 1 dose of iv lasix 20mg, cont to monitor creatinine
Resolved  Cr on admission 1.89, paeked at 2.46 then improved to 1. Now Cr -> 1.38 -> 1.74 -> 1.14 -> 1.04  Continued lower extremity edema (with negative dopplers for DVT and unremarkable 11/2024 TTE with intact EF)   AVOID FURTHER NSAIDs  Appreciate Renal input and recs  creatinine at 0.98 today, will give 1 dose of iv lasix 20mg, cont to monitor creatinine
Presents with continued edema  Will restart lasix 40 PO daily
Resolved  Cr on admission 1.89, paeked at 2.46 then improved to 1. Now Cr -> 1.38 -> 1.74 -> 1.14 -> 1.04  Continued lower extremity edema (with negative dopplers for DVT and unremarkable 11/2024 TTE with intact EF)   AVOID FURTHER NSAIDs  Appreciate Renal input and recs
Presents with continued edema  Restarted lasix 40 PO daily, now appears euvolemic, renal function improved  LE duplex - no evidence of deep vein thrombosis in the bilateral lower extremities, subcutaneous edema noted in the bilateral lower extremities.
Resolved  Cr on admission 1.89, peaked at 2.46 then improved to 1. Now Cr -> 1.38 -> 1.74 -> 1.14 -> 1.04  Continued lower extremity edema (with negative dopplers for DVT and unremarkable 11/2024 TTE with intact EF)   AVOID FURTHER NSAIDs  Appreciate Renal input and recs
Presents with continued edema  Will restart lasix 40 PO daily
Presents with continued edema  Will restart lasix 40 PO daily
Resolved  Cr on admission 1.89, peaked at 2.46 then improved to 1. Now Cr -> 1.38 -> 1.74 -> 1.14 -> 1.04  Continued lower extremity edema (with negative dopplers for DVT and unremarkable 11/2024 TTE with intact EF)   AVOID FURTHER NSAIDs  Appreciate Renal input and recs
Resolved  Cr on admission 1.89, paeked at 2.46 then improved to 1. Now Cr -> 1.38 -> 1.74 -> 1.14 -> 1.04  Continued lower extremity edema (with negative dopplers for DVT and unremarkable 11/2024 TTE with intact EF)   AVOID FURTHER NSAIDs  Appreciate Renal input and recs  If Cr stable 12/21, plan to resume lasix 40mg PO qd
Resolved  Cr on admission 1.89, peaked at 2.46 then improved to 1. Now Cr -> 1.38 -> 1.74 -> 1.14 -> 1.04  Continued lower extremity edema (with negative dopplers for DVT and unremarkable 11/2024 TTE with intact EF)   AVOID FURTHER NSAIDs  Appreciate Renal input and recs
Resolved  Cr on admission 1.89, peaked at 2.46 then improved to 1. Now Cr -> 1.38 -> 1.74 -> 1.14 -> 1.04  Continued lower extremity edema (with negative dopplers for DVT and unremarkable 11/2024 TTE with intact EF)   AVOID FURTHER NSAIDs  Appreciate Renal input and recs
Presents with continued edema  Will restart lasix 40 PO daily  Doppler R/O DVT as minimal movement since he is in the hospital
Cr on admission 1.89, paeked at 2.46 then improved to 1. Now Cr -> 1.38 -> 1.74.   Continued lower extremity edema (with negative dopplers for DVT and unremarkable 11/2024 TTE with intact EF)  PO lasix now discontinued. Continues on 1/2NS 75cc/hr. Receiving 1 unit pRBC as well today per heme.   Appreciate Renal input and recs
Resolved  Cr on admission 1.89, peaked at 2.46 then improved to 1. Now Cr -> 1.38 -> 1.74 -> 1.14 -> 1.04  Continued lower extremity edema (with negative dopplers for DVT and unremarkable 11/2024 TTE with intact EF)   AVOID FURTHER NSAIDs  Appreciate Renal input and recs
Presents with continued edema  Will restart lasix 40 PO daily  Doppler R/O DVT as minimal movement since he is in the hospital
Resolved  Cr on admission 1.89, paeked at 2.46 then improved to 1. Now Cr -> 1.38 -> 1.74 -> 1.14 -> 1.04  Continued lower extremity edema (with negative dopplers for DVT and unremarkable 11/2024 TTE with intact EF)   AVOID FURTHER NSAIDs  Appreciate Renal input and recs
Resolved  Cr on admission 1.89, peaked at 2.46 then improved to 1. Now Cr -> 1.38 -> 1.74 -> 1.14 -> 1.04  Continued lower extremity edema (with negative dopplers for DVT and unremarkable 11/2024 TTE with intact EF)   AVOID FURTHER NSAIDs  Appreciate Renal input and recs
Presents with continued edema  Restarted lasix 40 PO daily, now appears euvolemic, renal function improved  LE duplex - no evidence of deep vein thrombosis in the bilateral lower extremities, subcutaneous edema noted in the bilateral lower extremities.
Presents with continued edema  Will restart lasix 40 PO daily
Resolved  Cr on admission 1.89, peaked at 2.46 then improved to 1. Now Cr -> 1.38 -> 1.74 -> 1.14 -> 1.04  Continued lower extremity edema (with negative dopplers for DVT and unremarkable 11/2024 TTE with intact EF)   AVOID FURTHER NSAIDs  Appreciate Renal input and recs

## 2025-01-02 NOTE — PROGRESS NOTE ADULT - NS ATTEND AMEND GEN_ALL_CORE FT
Agree with above assessment and plan.   44 year old male with Hb SS, multiple admissions for sickle crisis, bilirubin continued to rise, sickle hepatopathy needing further intervention, s/p exchange. Now with improvement. 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 Outpatient urology follow up.   Follows with Dr Jordan at [facility] which he should continue to see after discharge. Continue pain management, O2 as needed, folic acid. Agree with ursodial. He is on room air, hemoglobin 8.5 with stable LDH. Continue to monitor LDH, retic and CBC daily however limit blood blood draws.

## 2025-01-02 NOTE — PROGRESS NOTE ADULT - PROBLEM SELECTOR PLAN 2
Pt spiking intermittent fever, new CXR showed opacities ( neg CXR x 3 in the past few days), high suspicion for acute chest,   Patient reports hgb baseline is ~ 5.5  - Discussed with Mayuri, s/p exhange transfusion 12/26 given high suspicion for acute chest syndrome and liver involvement; f/u Hgb Electrophoresis shows HbS: 10.6% (goal <30%); s/p port study 12/26 (port intact, continue to use)  - C/w dilaudid PCA, monitor pain, will gradually wean pt off PCA; pt takes Oxycodone 30 mg PRN at home  - Bowel regimen with Senna daily, miralax prn  - Patient follows Dr Jordan of [facility] - appreciate hem/onc input, s/p 2 unit pRBC this admission. Trend Hgb.   - Pt on Jadenu at home, discussed with Mayuri onc, hold at this time, plan to resume at time of discharge.

## 2025-01-02 NOTE — PROGRESS NOTE ADULT - PROBLEM SELECTOR PROBLEM 4
MATA (acute kidney injury)
Lower extremity edema
MATA (acute kidney injury)
Lower extremity edema
MATA (acute kidney injury)
Lower extremity edema
MATA (acute kidney injury)

## 2025-01-02 NOTE — PROGRESS NOTE ADULT - PROBLEM SELECTOR PLAN 8
DVT prop: lovenox. Elevated risk of DVT given SCC. No signs of bleeding.     Dispo: Home   - PT eval: no skilled PT needs

## 2025-01-02 NOTE — PROGRESS NOTE ADULT - PROBLEM SELECTOR PLAN 1
Fever to 101.2 at 21:35 on 12/17/24. Reported to have O2 sat in 80s With leukocytosis. focal infectious symptoms. Recent staph epi bacteremia during November admission here, seen by ID at that time. Patient has port in place,   - blood culture with Enterobacterales and pantoea, ID reconsulted, rec to switch meropenem (12/20-12/24) to ertapenem 1 gm q24h (12/24 - ), blood cultures from 12/26 still showing pantoea, repeat blood Cx on 12/28 NGTD  - IR consulted - s/p mediport [id number]  -repeat blood cx 12/31 NGTD  - echo ordered per ID rec- no e/o endocarditis  -f/u ID for discharge abx rec

## 2025-01-02 NOTE — PROVIDER CONTACT NOTE (OTHER) - SITUATION
HR of 117, Oral temp of 100.4
Poor blood return and some resistance when flushing port
HR sustaining in 120-130s, Oral temp of 99.8F
Patient currently has elevated temp of 102.2. Patient tachycardic in the 120s
Pt. complaining of right arm pain
pt has a temp of 102.6 F
pt is complaining of a headache and a temp of 100.1F
was unable to get pts blood draws. multiple nurses and PCAs tried.
Pt sustaining sinus tach (105bpm)
Floor RN unable to give alteplase as not competencied. No ANMs with competency available to give alteplase. JAKE nurse notified and also unavailable to give.
Patient had 18 beats of vtach on tele
Patient hardstick unable to obtain labs
Pt IV blew. No other access besides port which was exchanged in IR today.
Cathflo does not work, the port still feel resistance when flushing
patient /83 HR84, patient is anxious and showing jitteriness
patient complaining of soft stools and jitteriness
Port barely flushing with a lot of resistance. Poor blood return
Pt constipated and straining in bathroom. HR sustained 140 for 4 min
Pt tachy to 126bpm on tele monitor. RN assessed and took VS, pt found to be febrile to 102.3F. Pt endorses feeling hot but denies chest pain, lightheadedness, SOB, or any other symptoms.
pt reports pain from new ultrasound guided IV.

## 2025-01-02 NOTE — PROGRESS NOTE ADULT - PROBLEM SELECTOR PROBLEM 1
MATA (acute kidney injury)
Sepsis
Sepsis
MATA (acute kidney injury)
Sepsis
Sickle cell disease
Sickle cell disease
Fever
Fever
Sepsis
Sickle cell disease
Sickle cell disease
Sepsis
Fever
Sepsis
Sickle cell disease
Fever
Sepsis
Fever
Sickle cell disease
Sickle cell disease
Sepsis
Sickle cell disease
Sepsis

## 2025-01-03 ENCOUNTER — TRANSCRIPTION ENCOUNTER (OUTPATIENT)
Age: 45
End: 2025-01-03

## 2025-01-03 VITALS
DIASTOLIC BLOOD PRESSURE: 93 MMHG | SYSTOLIC BLOOD PRESSURE: 155 MMHG | HEART RATE: 96 BPM | OXYGEN SATURATION: 100 % | TEMPERATURE: 98 F | RESPIRATION RATE: 18 BRPM

## 2025-01-03 LAB
ALBUMIN SERPL ELPH-MCNC: 3.2 G/DL — LOW (ref 3.3–5)
ALP SERPL-CCNC: 138 U/L — HIGH (ref 40–120)
ALT FLD-CCNC: 30 U/L — SIGNIFICANT CHANGE UP (ref 4–41)
ANION GAP SERPL CALC-SCNC: 12 MMOL/L — SIGNIFICANT CHANGE UP (ref 7–14)
AST SERPL-CCNC: 60 U/L — HIGH (ref 4–40)
BASOPHILS # BLD AUTO: 0.12 K/UL — SIGNIFICANT CHANGE UP (ref 0–0.2)
BASOPHILS NFR BLD AUTO: 1.6 % — SIGNIFICANT CHANGE UP (ref 0–2)
BILIRUB SERPL-MCNC: 3.8 MG/DL — HIGH (ref 0.2–1.2)
BLD GP AB SCN SERPL QL: NEGATIVE — SIGNIFICANT CHANGE UP
BUN SERPL-MCNC: 19 MG/DL — SIGNIFICANT CHANGE UP (ref 7–23)
CALCIUM SERPL-MCNC: 9.2 MG/DL — SIGNIFICANT CHANGE UP (ref 8.4–10.5)
CHLORIDE SERPL-SCNC: 100 MMOL/L — SIGNIFICANT CHANGE UP (ref 98–107)
CO2 SERPL-SCNC: 20 MMOL/L — LOW (ref 22–31)
CREAT SERPL-MCNC: 0.78 MG/DL — SIGNIFICANT CHANGE UP (ref 0.5–1.3)
EGFR: 112 ML/MIN/1.73M2 — SIGNIFICANT CHANGE UP
EOSINOPHIL # BLD AUTO: 0.24 K/UL — SIGNIFICANT CHANGE UP (ref 0–0.5)
EOSINOPHIL NFR BLD AUTO: 3.2 % — SIGNIFICANT CHANGE UP (ref 0–6)
GLUCOSE SERPL-MCNC: 95 MG/DL — SIGNIFICANT CHANGE UP (ref 70–99)
HAPTOGLOB SERPL-MCNC: <20 MG/DL — LOW (ref 34–200)
HCT VFR BLD CALC: 21.8 % — LOW (ref 39–50)
HGB BLD-MCNC: 7.4 G/DL — LOW (ref 13–17)
IANC: 2.96 K/UL — SIGNIFICANT CHANGE UP (ref 1.8–7.4)
IMM GRANULOCYTES NFR BLD AUTO: 0.4 % — SIGNIFICANT CHANGE UP (ref 0–0.9)
INR BLD: 1.24 RATIO — HIGH (ref 0.85–1.16)
LDH SERPL L TO P-CCNC: 444 U/L — HIGH (ref 135–225)
LYMPHOCYTES # BLD AUTO: 3.1 K/UL — SIGNIFICANT CHANGE UP (ref 1–3.3)
LYMPHOCYTES # BLD AUTO: 41.8 % — SIGNIFICANT CHANGE UP (ref 13–44)
MAGNESIUM SERPL-MCNC: 1.6 MG/DL — SIGNIFICANT CHANGE UP (ref 1.6–2.6)
MCHC RBC-ENTMCNC: 29.7 PG — SIGNIFICANT CHANGE UP (ref 27–34)
MCHC RBC-ENTMCNC: 33.9 G/DL — SIGNIFICANT CHANGE UP (ref 32–36)
MCV RBC AUTO: 87.6 FL — SIGNIFICANT CHANGE UP (ref 80–100)
MONOCYTES # BLD AUTO: 0.96 K/UL — HIGH (ref 0–0.9)
MONOCYTES NFR BLD AUTO: 13 % — SIGNIFICANT CHANGE UP (ref 2–14)
NEUTROPHILS # BLD AUTO: 2.96 K/UL — SIGNIFICANT CHANGE UP (ref 1.8–7.4)
NEUTROPHILS NFR BLD AUTO: 40 % — LOW (ref 43–77)
NRBC # BLD: 0 /100 WBCS — SIGNIFICANT CHANGE UP (ref 0–0)
NRBC # FLD: 0 K/UL — SIGNIFICANT CHANGE UP (ref 0–0)
PHOSPHATE SERPL-MCNC: 3.1 MG/DL — SIGNIFICANT CHANGE UP (ref 2.5–4.5)
PLATELET # BLD AUTO: 623 K/UL — HIGH (ref 150–400)
POTASSIUM SERPL-MCNC: 4.1 MMOL/L — SIGNIFICANT CHANGE UP (ref 3.5–5.3)
POTASSIUM SERPL-SCNC: 4.1 MMOL/L — SIGNIFICANT CHANGE UP (ref 3.5–5.3)
PROT SERPL-MCNC: 7 G/DL — SIGNIFICANT CHANGE UP (ref 6–8.3)
PROTHROM AB SERPL-ACNC: 14.3 SEC — HIGH (ref 9.9–13.4)
RBC # BLD: 2.49 M/UL — LOW (ref 4.2–5.8)
RBC # BLD: 2.49 M/UL — LOW (ref 4.2–5.8)
RBC # FLD: 17.8 % — HIGH (ref 10.3–14.5)
RETICS #: 35.9 K/UL — SIGNIFICANT CHANGE UP (ref 25–125)
RETICS/RBC NFR: 1.4 % — SIGNIFICANT CHANGE UP (ref 0.5–2.5)
RH IG SCN BLD-IMP: POSITIVE — SIGNIFICANT CHANGE UP
SODIUM SERPL-SCNC: 132 MMOL/L — LOW (ref 135–145)
WBC # BLD: 7.41 K/UL — SIGNIFICANT CHANGE UP (ref 3.8–10.5)
WBC # FLD AUTO: 7.41 K/UL — SIGNIFICANT CHANGE UP (ref 3.8–10.5)

## 2025-01-03 PROCEDURE — 99239 HOSP IP/OBS DSCHRG MGMT >30: CPT

## 2025-01-03 RX ORDER — APIXABAN 5 MG/1
1 TABLET, FILM COATED ORAL
Qty: 74 | Refills: 0
Start: 2025-01-03 | End: 2025-02-01

## 2025-01-03 RX ORDER — OXYCODONE HCL 15 MG
1 TABLET ORAL
Qty: 0 | Refills: 0 | DISCHARGE
Start: 2025-01-03

## 2025-01-03 RX ORDER — CIPROFLOXACIN HYDROCHLORIDE 500 MG/1
1 TABLET, FILM COATED ORAL
Qty: 60 | Refills: 0
Start: 2025-01-03 | End: 2025-02-01

## 2025-01-03 RX ORDER — NIFEDIPINE 60 MG/1
1 TABLET, EXTENDED RELEASE ORAL
Qty: 30 | Refills: 0
Start: 2025-01-03 | End: 2025-02-01

## 2025-01-03 RX ORDER — URSODIOL 250 MG/1
1 TABLET, FILM COATED ORAL
Qty: 60 | Refills: 0
Start: 2025-01-03 | End: 2025-02-01

## 2025-01-03 RX ADMIN — PANTOPRAZOLE 40 MILLIGRAM(S): 40 TABLET, DELAYED RELEASE ORAL at 06:07

## 2025-01-03 RX ADMIN — Medication 30 MILLILITER(S): at 08:15

## 2025-01-03 RX ADMIN — ALLOPURINOL 50 MILLIGRAM(S): 100 TABLET ORAL at 11:33

## 2025-01-03 RX ADMIN — Medication 1 MILLIGRAM(S): at 11:33

## 2025-01-03 RX ADMIN — URSODIOL 500 MILLIGRAM(S): 250 TABLET, FILM COATED ORAL at 06:06

## 2025-01-03 RX ADMIN — NIFEDIPINE 30 MILLIGRAM(S): 60 TABLET, EXTENDED RELEASE ORAL at 06:07

## 2025-01-03 RX ADMIN — APIXABAN 10 MILLIGRAM(S): 5 TABLET, FILM COATED ORAL at 06:06

## 2025-01-03 RX ADMIN — ERTAPENEM SODIUM 120 MILLIGRAM(S): 1 INJECTION, POWDER, LYOPHILIZED, FOR SOLUTION INTRAMUSCULAR; INTRAVENOUS at 10:16

## 2025-01-03 RX ADMIN — CHLORHEXIDINE GLUCONATE 1 APPLICATION(S): 1.2 RINSE ORAL at 11:32

## 2025-01-03 NOTE — CHART NOTE - NSCHARTNOTEFT_GEN_A_CORE
pt seen and examined. vitals, labs reviewed. Pt is stable for discharge home today. total time spent on dc 42min

## 2025-01-03 NOTE — PHARMACOTHERAPY INTERVENTION NOTE - COMMENTS
Educated patient on apixaban including indications, side effects and administration. Discussed adverse effects in detail and when to seek medical attention (blood in stool, vomit, etc.). Informed patient to notify all providers about being on this medication prior to any planned procedures. Other discharge medications were also reviewed with patient. Current medication schedule was discussed in detail including: medication name, indication, dose, administration times, treatment duration, side effects, drug interactions, and special instructions. Patient questions and concerns were answered and addressed. Patient demonstrated understanding and drug information handouts were provided.    Lupe RosasD, [facility]  Clinical Pharmacy Specialist  Spectra: [phone]

## 2025-01-03 NOTE — PROGRESS NOTE ADULT - NS ATTEND AMEND GEN_ALL_CORE FT
Agree with above assessment and plan.   44 year old male with Hb SS, multiple admissions for sickle crisis, bilirubin continued to rise, sickle hepatopathy needing further intervention, s/p exchange. Now with improvement. 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 Outpatient urology follow up.   Follows with Dr Jordan at [facility] which he should continue to see after discharge. Continue pain management, O2 as needed, folic acid. Agree with ursodial. He is on room air, hemoglobin 7.4, expect it will have a decline to his baseline with stable LDH. Continue to monitor LDH, retic and CBC daily however limit blood blood draws.

## 2025-01-03 NOTE — PROGRESS NOTE ADULT - PROVIDER SPECIALTY LIST ADULT
Anesthesia
Heme/Onc
Heme/Onc
Hospitalist
Hospitalist
Infectious Disease
Vascular Surgery
Heme/Onc
Hepatology
Hospitalist
Infectious Disease
Nephrology
Nephrology
Heme/Onc
Hospitalist
Hospitalist
Infectious Disease
Vascular Surgery
Heme/Onc
Hepatology
Hospitalist
Hospitalist
Infectious Disease
Hospitalist

## 2025-01-03 NOTE — DISCHARGE NOTE NURSING/CASE MANAGEMENT/SOCIAL WORK - NSDCPEFALRISK_GEN_ALL_CORE
For information on Fall & Injury Prevention, visit: [email or web address] OR  [email or web address] OR  https://www.cdc.gov/steadi/patient.html

## 2025-01-03 NOTE — DISCHARGE NOTE NURSING/CASE MANAGEMENT/SOCIAL WORK - PATIENT PORTAL LINK FT
You can access the FollowMyHealth Patient Portal offered by [facility] by registering at the following website: [email or web address]. By joining [employer]’s FollowMyHealth portal, you will also be able to view your health information using other applications (apps) compatible with our system.

## 2025-01-03 NOTE — PROGRESS NOTE ADULT - SUBJECTIVE AND OBJECTIVE BOX
Patient is a 44y old  Male who presents with a chief complaint of mech fall, pain, dyspnea (02 Jan 2025 18:53)  patient seen, Hgb 7.1  Tbili 3.7, remains afebrile s/p rbc exchange      MEDICATIONS  (STANDING):  allopurinol 50 milliGRAM(s) Oral daily  apixaban 10 milliGRAM(s) Oral every 12 hours  calcium gluconate IVPB 2 Gram(s) IV Intermittent once  chlorhexidine 2% Cloths 1 Application(s) Topical daily  ertapenem  IVPB      ertapenem  IVPB 1000 milliGRAM(s) IV Intermittent every 24 hours  folic acid 1 milliGRAM(s) Oral every 24 hours  HYDROmorphone PCA (5 mG/mL) 30 milliLiter(s) PCA Continuous PCA Continuous  influenza   Vaccine 0.5 milliLiter(s) IntraMuscular once  NIFEdipine XL 30 milliGRAM(s) Oral daily  pantoprazole    Tablet 40 milliGRAM(s) Oral before breakfast  senna 2 Tablet(s) Oral at bedtime  sodium chloride 0.45%. 1000 milliLiter(s) (30 mL/Hr) IV Continuous <Continuous>  ursodiol Tablet 500 milliGRAM(s) Oral two times a day    MEDICATIONS  (PRN):  naloxone Injectable 0.1 milliGRAM(s) IV Push once PRN Opioid Overdose  ondansetron Injectable 4 milliGRAM(s) IV Push every 8 hours PRN Nausea and/or Vomiting  oxyCODONE    IR 30 milliGRAM(s) Oral every 4 hours PRN Severe Pain (7 - 10)      Vital Signs Last 24 Hrs  T(C): 36.5 (03 Jan 2025 01:20), Max: 37 (02 Jan 2025 16:47)  T(F): 97.7 (03 Jan 2025 01:20), Max: 98.6 (02 Jan 2025 16:47)  HR: 73 (03 Jan 2025 01:20) (73 - 84)  BP: 159/95 (03 Jan 2025 01:20) (127/78 - 163/93)  BP(mean): 108 (03 Jan 2025 01:20) (108 - 108)  RR: 18 (03 Jan 2025 01:20) (18 - 18)  SpO2: 100% (03 Jan 2025 01:20) (97% - 100%)    Parameters below as of 03 Jan 2025 01:20  Patient On (Oxygen Delivery Method): room air        PE  NAD  Awake, alert  Anicteric, MMM  RRR  CTAB  Abd soft, NT, ND  No c/c/e  No rash grossly                            7.1    7.40  )-----------( 507      ( 02 Jan 2025 05:20 )             21.3       01-02    133[L]  |  100  |  22  ----------------------------<  122[H]  4.1   |  20[L]  |  0.77    Ca    8.7      02 Jan 2025 05:20  Phos  3.4     01-02  Mg     1.60     01-02    TPro  6.5  /  Alb  3.1[L]  /  TBili  3.7[H]  /  DBili  x   /  AST  58[H]  /  ALT  25  /  AlkPhos  127[H]  01-02

## 2025-01-03 NOTE — PROGRESS NOTE ADULT - REASON FOR ADMISSION
mech fall, pain, dyspnea

## 2025-01-03 NOTE — PROGRESS NOTE ADULT - ASSESSMENT
44 year old male with history of sickle cell anemia presenting today s/p mechanical fall    Sickle cell Disease - Pain Crisis   - Follows with Dr Jordan of [facility]  - Hgb 7.1, consistent with outpatient labs  - Chronic hemolysis, iron overload; trying to limit transfusions - Hgb goal > 6.0  - Jadenu on hold for now, can restart at d/c.   - Maximize pain control; currently on Dilaudid PCA pump    Gram negative bacteremia  - Source unclear but noted to have rising DIRECT bilirubin , now improved  - Repeat cultures from 12/26 are positive, repeat 12/31 NGTD  - s/p port removal on 12/30/24.  - monitor BC.    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  - possibly secondary to liver disease.  - appreciate hepatology recs.    Hypoxia   - Mild and intermittent; previously walked and had no desaturation on ambulation and there is no evidence of infiltrate on chest x-rays or CT and no chest pain, most recent CXR without evidence of PNA but likely atelectasis - had not been using incentive spirometer - advised to use throughout the day; denies any chest pain     Urothelial lesion  CT urogram shows filling defects in the left renal collecting system  suspicious for urothelial neoplasm. stable abdominal and retroperitoneal lymphadenopathy  -Urology recs appreciated, outpatient follow up    DVT left brachial vein and right IJ   cont with eliquis    Will continue to follow    Meghana Real NP  Hematology/Oncology  [facility]  [phone] (Office)  [phone] (Alt office)  Evenings and weekends please call MD on call or office

## 2025-01-03 NOTE — DISCHARGE NOTE NURSING/CASE MANAGEMENT/SOCIAL WORK - FINANCIAL ASSISTANCE
[facility] provides services at a reduced cost to those who are determined to be eligible through [facility]’s financial assistance program. Information regarding [facility]’s financial assistance program can be found by going to [email or web address] or by calling [phone].

## 2025-01-05 LAB
CULTURE RESULTS: SIGNIFICANT CHANGE UP
SPECIMEN SOURCE: SIGNIFICANT CHANGE UP

## 2025-01-06 ENCOUNTER — NON-APPOINTMENT (OUTPATIENT)
Age: 45
End: 2025-01-06

## 2025-01-06 LAB
CULTURE RESULTS: ABNORMAL
CULTURE RESULTS: ABNORMAL
ORGANISM # SPEC MICROSCOPIC CNT: ABNORMAL
ORGANISM # SPEC MICROSCOPIC CNT: ABNORMAL

## 2025-01-07 NOTE — CHART NOTE - NSCHARTNOTESELECT_GEN_ALL_CORE
Event Note
Post-Procedure Check/Event Note
CT Urogram results/Event Note
Event Note
IR Pre-Procedure/Event Note
Nutrition Services
Post-Discharge Note
RBC Exchange/Blood Bank
RBCX/Blood Bank
Vascular Surgery
discharge/Event Note

## 2025-01-07 NOTE — CHART NOTE - NSCHARTNOTEFT_GEN_A_CORE
Post-Discharge Medication Review: Completed	  Patient's preferred pharmacy was updated in [facility]: [facility]	  Patient contacted to offer medication counseling post-discharge. Medication reconciliation completed. Per patient, medications include:	  	  1.	allopurinol 100 mg oral tablet 0.5 tab(s) orally once a day  2.	ciprofloxacin 500 mg oral tablet 1 tab(s) orally every 12 hours  3.	Eliquis Starter Pack for Treatment of DVT and PE 5 mg oral tablet 1 tab(s) orally 2 times a day please follow instructions ions on starter pack. you received 6 doses on the 10mg pills while in the hospital  4.	folic acid 1 mg oral tablet 1 tab(s) orally every 24 hours  5.	Jadenu 360 mg oral tablet 2 tab(s) orally once a day  6.	NIFEdipine 30 mg oral tablet, extended release 1 tab(s) orally once a day  7.	omeprazole 20 mg oral delayed release capsule 1 cap(s) orally once a day  8.	oxyCODONE 30 mg oral tablet 1 tab(s) orally every 4 hours as needed for severe pain  9.	polyethylene glycol 3350 oral powder for reconstitution 17 gram(s) orally once a day  10.	senna leaf extract oral tablet 2 tab(s) orally once a day (at bedtime) Do not use if you develop diarrhea  11.	ursodiol 500 mg oral tablet 1 tab(s) orally 2 times a day  	  Medication name, indication, administration, side effect, and monitoring reviewed for new medications during post discharge counseling visit with patient. Patient demonstrated understanding. Counseling offered for all medications.	    Reinforced Eliquis directions, patient acknowledged understanding.     Yasir Vail PharmD	  Clinical Pharmacy Specialist, Pharmacy Telehealth Team	  Can be reached via MS Teams or [phone]

## 2025-02-06 ENCOUNTER — APPOINTMENT (OUTPATIENT)
Dept: UROLOGY | Facility: CLINIC | Age: 45
End: 2025-02-06
Payer: MEDICAID

## 2025-02-06 VITALS
OXYGEN SATURATION: 96 % | HEART RATE: 110 BPM | BODY MASS INDEX: 28.14 KG/M2 | SYSTOLIC BLOOD PRESSURE: 149 MMHG | DIASTOLIC BLOOD PRESSURE: 89 MMHG | RESPIRATION RATE: 17 BRPM | HEIGHT: 69 IN | WEIGHT: 190 LBS | TEMPERATURE: 98.3 F

## 2025-02-06 DIAGNOSIS — D41.12: ICD-10-CM

## 2025-02-06 DIAGNOSIS — D57.1 SICKLE-CELL DISEASE W/OUT CRISIS: ICD-10-CM

## 2025-02-06 PROCEDURE — G2211 COMPLEX E/M VISIT ADD ON: CPT | Mod: NC

## 2025-02-06 PROCEDURE — 99204 OFFICE O/P NEW MOD 45 MIN: CPT

## 2025-02-06 RX ORDER — CHROMIUM 200 MCG
TABLET ORAL
Refills: 0 | Status: ACTIVE | COMMUNITY

## 2025-02-06 RX ORDER — APIXABAN 5 MG/1
TABLET, FILM COATED ORAL
Refills: 0 | Status: ACTIVE | COMMUNITY

## 2025-02-06 RX ORDER — OXYCODONE AND ACETAMINOPHEN 5; 325 MG/1; MG/1
5-325 TABLET ORAL
Refills: 0 | Status: ACTIVE | COMMUNITY

## 2025-02-12 ENCOUNTER — APPOINTMENT (OUTPATIENT)
Dept: HEPATOLOGY | Facility: CLINIC | Age: 45
End: 2025-02-12
Payer: MEDICAID

## 2025-02-12 VITALS
HEIGHT: 69 IN | SYSTOLIC BLOOD PRESSURE: 162 MMHG | TEMPERATURE: 97.3 F | WEIGHT: 219 LBS | DIASTOLIC BLOOD PRESSURE: 90 MMHG | HEART RATE: 100 BPM | BODY MASS INDEX: 32.44 KG/M2 | RESPIRATION RATE: 719 BRPM | OXYGEN SATURATION: 95 %

## 2025-02-12 DIAGNOSIS — K74.60 UNSPECIFIED CIRRHOSIS OF LIVER: ICD-10-CM

## 2025-02-12 DIAGNOSIS — Z83.2 FAMILY HISTORY OF DISEASES OF THE BLOOD AND BLOOD-FORMING ORGANS AND CERTAIN DISORDERS INVOLVING THE IMMUNE MECHANISM: ICD-10-CM

## 2025-02-12 DIAGNOSIS — E83.19 OTHER DISORDERS OF IRON METABOLISM: ICD-10-CM

## 2025-02-12 PROCEDURE — 99214 OFFICE O/P EST MOD 30 MIN: CPT

## 2025-02-12 RX ORDER — ALLOPURINOL 200 MG/1
TABLET ORAL
Refills: 0 | Status: ACTIVE | COMMUNITY

## 2025-02-25 ENCOUNTER — OUTPATIENT (OUTPATIENT)
Dept: OUTPATIENT SERVICES | Facility: HOSPITAL | Age: 45
LOS: 1 days | End: 2025-02-25
Payer: MEDICAID

## 2025-02-25 ENCOUNTER — APPOINTMENT (OUTPATIENT)
Dept: INFECTIOUS DISEASE | Facility: CLINIC | Age: 45
End: 2025-02-25

## 2025-02-25 VITALS
WEIGHT: 202 LBS | DIASTOLIC BLOOD PRESSURE: 90 MMHG | BODY MASS INDEX: 29.92 KG/M2 | OXYGEN SATURATION: 89 % | SYSTOLIC BLOOD PRESSURE: 167 MMHG | HEART RATE: 130 BPM | HEIGHT: 69 IN | TEMPERATURE: 98.6 F

## 2025-02-25 DIAGNOSIS — Z90.49 ACQUIRED ABSENCE OF OTHER SPECIFIED PARTS OF DIGESTIVE TRACT: Chronic | ICD-10-CM

## 2025-02-25 DIAGNOSIS — D57.1 SICKLE-CELL DISEASE WITHOUT CRISIS: ICD-10-CM

## 2025-02-25 DIAGNOSIS — R78.81 BACTEREMIA: ICD-10-CM

## 2025-02-25 PROCEDURE — 99214 OFFICE O/P EST MOD 30 MIN: CPT

## 2025-02-25 PROCEDURE — G0463: CPT

## 2025-03-12 ENCOUNTER — OUTPATIENT (OUTPATIENT)
Dept: OUTPATIENT SERVICES | Facility: HOSPITAL | Age: 45
LOS: 1 days | End: 2025-03-12

## 2025-03-12 VITALS
DIASTOLIC BLOOD PRESSURE: 74 MMHG | HEIGHT: 67 IN | SYSTOLIC BLOOD PRESSURE: 150 MMHG | HEART RATE: 90 BPM | OXYGEN SATURATION: 97 % | TEMPERATURE: 98 F | WEIGHT: 199.96 LBS | RESPIRATION RATE: 16 BRPM

## 2025-03-12 DIAGNOSIS — Z98.890 OTHER SPECIFIED POSTPROCEDURAL STATES: Chronic | ICD-10-CM

## 2025-03-12 DIAGNOSIS — I82.409 ACUTE EMBOLISM AND THROMBOSIS OF UNSPECIFIED DEEP VEINS OF UNSPECIFIED LOWER EXTREMITY: ICD-10-CM

## 2025-03-12 DIAGNOSIS — D41.12 NEOPLASM OF UNCERTAIN BEHAVIOR OF LEFT RENAL PELVIS: ICD-10-CM

## 2025-03-12 DIAGNOSIS — D57.1 SICKLE-CELL DISEASE WITHOUT CRISIS: ICD-10-CM

## 2025-03-12 DIAGNOSIS — Z90.49 ACQUIRED ABSENCE OF OTHER SPECIFIED PARTS OF DIGESTIVE TRACT: Chronic | ICD-10-CM

## 2025-03-12 NOTE — H&P PST ADULT - GENERAL COMMENTS
h/o Sepsis In November and December 2024, during November admission , patient was seen by ID at that time. Patient has port in place ,, Blood cultures positive ,, 12 /30/24- s/p mediport removal, Echo done No endocarditis, Completed 4 weeks of Cipro, No Symptoms since then  ,

## 2025-03-12 NOTE — H&P PST ADULT - CARDIOVASCULAR SYMPTOMS
Has  hypertension, was  started on nifedipine in the hospital,  Patient  denies taking any medications at this time . Blood pressure today at /74.

## 2025-03-12 NOTE — H&P PST ADULT - HISTORY OF PRESENT ILLNESS
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     H/o Sickle cell disease with multiple admissions for crisis.   Last admitted in December 2024 ( 12/9/24- 1/3/25)  for sickle cell crisis/acute chest syndrome and dx of cirrhosis 2/2 secondary iron overload who presented w/ dyspnea, fall, and acute pain.  During that admission found to be sepsis/bacteremic, underwent exchange transfusion. Completed 4 week course of ciprofloxacin. - had medi-port removed    During admission, elevated bilirubin. Underwent MRCP which found incidental soft tissue lesion in the left renal pelvis.  Patient reported he has been getting iron transfusions every 2 months or even less sometimes for the past 10 years.His last exchange was several years ago before recent hospital admission      During admission was found to have Right upper extremity DVT and started on Eliquis. 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     H/o Sickle cell disease with multiple admissions for crisis.   Last admitted in December 2024 ( 12/9/24- 1/3/25)  for sickle cell crisis/acute chest syndrome and dx of cirrhosis 2/2 secondary iron overload who presented w/ dyspnea, fall, and acute pain.  During that admission found to be sepsis/bacteremic, underwent exchange transfusion. Completed 4 week course of ciprofloxacin. - had medi-port removed    During admission, elevated bilirubin. Underwent MRCP which found incidental soft tissue lesion in the left renal pelvis.  Patient reported he has been getting iron transfusions every 2 months or even less sometimes for the past 10 years. His last exchange was several years ago before recent hospital admission      During admission was found to have  upper extremity DVT ( Right Internal jugular Vein and left brachial vein)- started on Eliquis.

## 2025-03-12 NOTE — H&P PST ADULT - PROBLEM SELECTOR PLAN 3
H/o Sickle cell disease with multiple admissions for crisis,  underwent exchange transfusion last December.     Last blood work done on 2/13/25 - CBC by hematologist - Patient is a hard stick   WBC - 8.2, H/H 6.0/16.7,  -   Patient scheduled to have blood work with hematologist tomorrow (3/13/25). Patient reports that he was told by his hematologist -  planning to get transfusion approx 3/13 prior to having urologic surgery on 3/18.  [place] H/o Sickle cell disease with multiple admissions for crisis,  underwent exchange transfusion last December.     Last blood work done on 2/13/25 - CBC by hematologist - Patient is a hard stick   WBC - 8.2, H/H 6.0/16.7,  -   Patient scheduled to have blood work with hematologist tomorrow (3/13/25). Patient reports that he was told by his hematologist -  planning to get transfusion approx 3/13 prior to having urologic surgery on 3/18.  Last visit note requested.

## 2025-03-12 NOTE — H&P PST ADULT - HEMATOLOGY/LYMPHATICS COMMENTS
h/o Sickle cell disease with multiple admissions for crisis., s/p exhange transfusion 12/26, Pt on [facility] ,

## 2025-03-12 NOTE — H&P PST ADULT - NSANTHOSAYNRD_GEN_A_CORE
No. VANNA screening performed.  STOP BANG Legend: 0-2 = LOW Risk; 3-4 = INTERMEDIATE Risk; 5-8 = HIGH Risk

## 2025-03-12 NOTE — H&P PST ADULT - GENITOURINARY COMMENTS
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

## 2025-03-12 NOTE — H&P PST ADULT - RESPIRATORY AND THORAX COMMENTS
gets slight sob with ambulating -reports this is his baseline gets slight sob with ambulating -reports this is his baseline- h/o Sickle cell disease low H/H

## 2025-03-12 NOTE — H&P PST ADULT - PROBLEM SELECTOR PLAN 2
recent Right upper extremity DVT and on Eliquis.   Patient to obtain plan from his hematologist for his anticoagulation . Appointment with hematologist on 3/13/25. Recent upper extremity DVT and on Eliquis.   Patient Instructed to obtain plan from his hematologist for his anticoagulation . Appointment with hematologist on 3/13/25.

## 2025-03-12 NOTE — H&P PST ADULT - GASTROINTESTINAL COMMENTS
h/o Hyperbilirubinemia,- , patient has bilirubin 5-6 w/ elevated liver enzymes.  his bilirubin has been 13 during stay in hospital.  patient follows up with hepatologist .- likely 2/2 sickle cell crisis

## 2025-03-12 NOTE — H&P PST ADULT - OTHER CARE PROVIDERS
Dr Quigley (hepatologist) [phone]                                                                  Dr Valadez (ID)

## 2025-03-12 NOTE — H&P PST ADULT - LAST ECHOCARDIOGRAM
1/1/25- LV systolic function is normal, EF 57% , mild pulmonary hypertension, no significant valvular regurgitation or echocardiographic evidence of endocarditis - Results in HIE

## 2025-03-13 DIAGNOSIS — B97.89 OTHER VIRAL AGENTS AS THE CAUSE OF DISEASES CLASSIFIED ELSEWHERE: ICD-10-CM

## 2025-03-13 LAB
CULTURE RESULTS: NO GROWTH — SIGNIFICANT CHANGE UP
SPECIMEN SOURCE: SIGNIFICANT CHANGE UP

## 2025-03-14 ENCOUNTER — INPATIENT (INPATIENT)
Facility: HOSPITAL | Age: 45
LOS: 62 days | Discharge: NURSING, REHAB OR LONG-TERM CARE | End: 2025-05-16
Attending: STUDENT IN AN ORGANIZED HEALTH CARE EDUCATION/TRAINING PROGRAM | Admitting: INTERNAL MEDICINE
Payer: MEDICAID

## 2025-03-14 VITALS
SYSTOLIC BLOOD PRESSURE: 147 MMHG | WEIGHT: 199.96 LBS | TEMPERATURE: 99 F | HEIGHT: 67 IN | RESPIRATION RATE: 18 BRPM | OXYGEN SATURATION: 88 % | DIASTOLIC BLOOD PRESSURE: 85 MMHG | HEART RATE: 107 BPM

## 2025-03-14 DIAGNOSIS — Z90.49 ACQUIRED ABSENCE OF OTHER SPECIFIED PARTS OF DIGESTIVE TRACT: Chronic | ICD-10-CM

## 2025-03-14 DIAGNOSIS — Z98.890 OTHER SPECIFIED POSTPROCEDURAL STATES: Chronic | ICD-10-CM

## 2025-03-14 PROCEDURE — 99291 CRITICAL CARE FIRST HOUR: CPT | Mod: 25

## 2025-03-14 RX ORDER — HYDROMORPHONE/SOD CHLOR,ISO/PF 2 MG/10 ML
2 SYRINGE (ML) INJECTION ONCE
Refills: 0 | Status: DISCONTINUED | OUTPATIENT
Start: 2025-03-14 | End: 2025-03-14

## 2025-03-14 NOTE — ED ADULT NURSE NOTE - NSFALLUNIVINTERV_ED_ALL_ED
Bed/Stretcher in lowest position, wheels locked, appropriate side rails in place/Call bell, personal items and telephone in reach/Instruct patient to call for assistance before getting out of bed/chair/stretcher/Non-slip footwear applied when patient is off stretcher/[place] to call system/Physically safe environment - no spills, clutter or unnecessary equipment/Purposeful proactive rounding/Room/bathroom lighting operational, light cord in reach

## 2025-03-14 NOTE — ED ADULT NURSE NOTE - OBJECTIVE STATEMENT
ROBERT SYED. Pt received in spot 23. A&O4. ambulatory. hx: SCC, anemia, gout, arthritis. Came into ED with complaints of lower back pain similar to sickle cell crisis. Pt seen at outpatient PCP w/ abnormal labs showing hbg 5.3. Pt reports lethargy and fatigue x 2 weeks. Denies SOB, chest pain, N,V, fevers, chills. pt received in 3L NC, desaturating down to 88% on RA. Respirations even and unlabored. no signs of acute distress noted. pt appears jaundice and pale. sinus tachy on monitor. Pending labs and medications. report given to primary YAHAIRA benitez. Comfort and safety maintained. VSS. call bell within reach.

## 2025-03-14 NOTE — ED PROVIDER NOTE - CLINICAL SUMMARY MEDICAL DECISION MAKING FREE TEXT BOX
The patient is a 44-year-old male with a history of sickle cell disease, prior DVT stopped Eliquis yesterday in preparation for a nephrology procedure on the 19th, who presents for a hemoglobin of 5.  Patient also endorses for the past few weeks he has been feeling more lethargic.  Also endorses pain consistent with prior sickle cell crisis pain primarily in his low back.  Patient denies any chest pain, shortness of breath, abdominal pain, nausea, vomiting, fever, chills, specific joint pain.  Was admitted in December, received transfusions at this time.  Has a history of acute chest and has required exchange transfusions in the past.    On physical exam patient appears jaundiced with scleral icterus, lungs clear to auscultation bilaterally, heart rate regular rate noted no murmurs rubs or gallops, abdomen soft nontender, bilateral lower limb pitting edema.    Concern for anemia in setting of sickle cell, may be hemolytic due to prior history of hemolysis.  Plan for CBC, CMP, retake count, LDH, haptoglobin, indirect indirect bilirubin.  Patient also desatted to 88% on room air, plan for EKG and x-ray.  Will likely need admission.

## 2025-03-14 NOTE — ED PROVIDER NOTE - ATTENDING CONTRIBUTION TO CARE
Royal Ayala, DO: I have personally provided the amount of critical care time documented below, concurrently with the Resident/Fellow. This time excludes time spent on separate procedures and time spent teaching. I have reviewed the Resident's/Fellow's documentation and I agree with the assessment and plan of care. 45 yo m PMH sickle cell, PW anemia.  Patient reports he had his routine blood work revealing hemoglobin of 4.5.  Reports fatigue, malaise.  Patient has jaundice at baseline.  Denies blood in stool.  Denies hematuria.  Reports LBP which is chronic for him, consistent with his prior sickle cell crisis.  Patient arrives HDS, well-appearing, PE as noted.  Plan for transfusion.  Plan for admission.  Do not suspect acute chest.

## 2025-03-14 NOTE — ED PROVIDER NOTE - PROGRESS NOTE DETAILS
Spoke to [facility] on-call who indicated that they would recommend a CTA as patient initially desatted to 88%.  But has been on room air for the past hour and satting at greater than 90 patient also does not have any chest pain or shortness of breath.  Dr. Jordan will follow in the morning.  Plan for admission.  Luis Cooper MD PGY-2 Spoke to [facility] on-call who indicated that they would recommend a CTA as patient initially desatted to 88%.  But has been on room air for the past hour and satting at greater than 90 patient also does not have any chest pain or shortness of breath.  They do not recommned exchange transfusion. Dr. Jordan will follow in the morning.  Plan for admission.  Luis Cooper MD PGY-2

## 2025-03-14 NOTE — ED ADULT TRIAGE NOTE - CHIEF COMPLAINT QUOTE
Pt arrives to Ed Pt st" I went to my PMD yesterday rountin visit I have sickle cell, I have lower back , legs and arms and feeling very tired. My count HGB is low told to come to ER. Need transfusion."  Denies Shortness of breath denies chest pain. Pt appears pale. hx Sickle Cell , gout and arthritis. Last transfusion and exchange tranfusions Dec 2024.

## 2025-03-15 DIAGNOSIS — Z29.9 ENCOUNTER FOR PROPHYLACTIC MEASURES, UNSPECIFIED: ICD-10-CM

## 2025-03-15 DIAGNOSIS — R74.01 ELEVATION OF LEVELS OF LIVER TRANSAMINASE LEVELS: ICD-10-CM

## 2025-03-15 DIAGNOSIS — F12.20 CANNABIS DEPENDENCE, UNCOMPLICATED: ICD-10-CM

## 2025-03-15 DIAGNOSIS — D41.12 NEOPLASM OF UNCERTAIN BEHAVIOR OF LEFT RENAL PELVIS: ICD-10-CM

## 2025-03-15 DIAGNOSIS — J96.01 ACUTE RESPIRATORY FAILURE WITH HYPOXIA: ICD-10-CM

## 2025-03-15 DIAGNOSIS — D64.9 ANEMIA, UNSPECIFIED: ICD-10-CM

## 2025-03-15 DIAGNOSIS — D57.00 HB-SS DISEASE WITH CRISIS, UNSPECIFIED: ICD-10-CM

## 2025-03-15 DIAGNOSIS — I82.621 ACUTE EMBOLISM AND THROMBOSIS OF DEEP VEINS OF RIGHT UPPER EXTREMITY: ICD-10-CM

## 2025-03-15 DIAGNOSIS — I10 ESSENTIAL (PRIMARY) HYPERTENSION: ICD-10-CM

## 2025-03-15 DIAGNOSIS — E87.1 HYPO-OSMOLALITY AND HYPONATREMIA: ICD-10-CM

## 2025-03-15 PROBLEM — I82.409 ACUTE EMBOLISM AND THROMBOSIS OF UNSPECIFIED DEEP VEINS OF UNSPECIFIED LOWER EXTREMITY: Chronic | Status: ACTIVE | Noted: 2025-03-12

## 2025-03-15 LAB
ALBUMIN SERPL ELPH-MCNC: 3.8 G/DL — SIGNIFICANT CHANGE UP (ref 3.3–5)
ALBUMIN SERPL ELPH-MCNC: 3.9 G/DL — SIGNIFICANT CHANGE UP (ref 3.3–5)
ALP SERPL-CCNC: 178 U/L — HIGH (ref 40–120)
ALP SERPL-CCNC: 188 U/L — HIGH (ref 40–120)
ALT FLD-CCNC: 51 U/L — HIGH (ref 4–41)
ALT FLD-CCNC: 52 U/L — HIGH (ref 4–41)
ANION GAP SERPL CALC-SCNC: 11 MMOL/L — SIGNIFICANT CHANGE UP (ref 7–14)
ANION GAP SERPL CALC-SCNC: 13 MMOL/L — SIGNIFICANT CHANGE UP (ref 7–14)
ANISOCYTOSIS BLD QL: SIGNIFICANT CHANGE UP
AST SERPL-CCNC: 138 U/L — HIGH (ref 4–40)
AST SERPL-CCNC: 139 U/L — HIGH (ref 4–40)
BASOPHILS # BLD AUTO: 0.11 K/UL — SIGNIFICANT CHANGE UP (ref 0–0.2)
BASOPHILS # BLD AUTO: 0.12 K/UL — SIGNIFICANT CHANGE UP (ref 0–0.2)
BASOPHILS NFR BLD AUTO: 1 % — SIGNIFICANT CHANGE UP (ref 0–2)
BASOPHILS NFR BLD AUTO: 1.1 % — SIGNIFICANT CHANGE UP (ref 0–2)
BILIRUB DIRECT SERPL-MCNC: 3.5 MG/DL — HIGH (ref 0–0.3)
BILIRUB INDIRECT FLD-MCNC: 4.8 MG/DL — HIGH (ref 0–1)
BILIRUB SERPL-MCNC: 7.4 MG/DL — HIGH (ref 0.2–1.2)
BILIRUB SERPL-MCNC: 8.3 MG/DL — HIGH (ref 0.2–1.2)
BILIRUB SERPL-MCNC: 8.4 MG/DL — HIGH (ref 0.2–1.2)
BLD GP AB SCN SERPL QL: NEGATIVE — SIGNIFICANT CHANGE UP
BUN SERPL-MCNC: 26 MG/DL — HIGH (ref 7–23)
BUN SERPL-MCNC: 28 MG/DL — HIGH (ref 7–23)
CALCIUM SERPL-MCNC: 9.1 MG/DL — SIGNIFICANT CHANGE UP (ref 8.4–10.5)
CALCIUM SERPL-MCNC: 9.1 MG/DL — SIGNIFICANT CHANGE UP (ref 8.4–10.5)
CHLORIDE SERPL-SCNC: 101 MMOL/L — SIGNIFICANT CHANGE UP (ref 98–107)
CHLORIDE SERPL-SCNC: 99 MMOL/L — SIGNIFICANT CHANGE UP (ref 98–107)
CO2 SERPL-SCNC: 19 MMOL/L — LOW (ref 22–31)
CO2 SERPL-SCNC: 19 MMOL/L — LOW (ref 22–31)
CREAT SERPL-MCNC: 1.18 MG/DL — SIGNIFICANT CHANGE UP (ref 0.5–1.3)
CREAT SERPL-MCNC: 1.25 MG/DL — SIGNIFICANT CHANGE UP (ref 0.5–1.3)
EGFR: 72 ML/MIN/1.73M2 — SIGNIFICANT CHANGE UP
EGFR: 72 ML/MIN/1.73M2 — SIGNIFICANT CHANGE UP
EGFR: 78 ML/MIN/1.73M2 — SIGNIFICANT CHANGE UP
EGFR: 78 ML/MIN/1.73M2 — SIGNIFICANT CHANGE UP
ELLIPTOCYTES BLD QL SMEAR: SIGNIFICANT CHANGE UP
EOSINOPHIL # BLD AUTO: 0 K/UL — SIGNIFICANT CHANGE UP (ref 0–0.5)
EOSINOPHIL # BLD AUTO: 0.09 K/UL — SIGNIFICANT CHANGE UP (ref 0–0.5)
EOSINOPHIL NFR BLD AUTO: 0 % — SIGNIFICANT CHANGE UP (ref 0–6)
EOSINOPHIL NFR BLD AUTO: 0.9 % — SIGNIFICANT CHANGE UP (ref 0–6)
FLUAV AG NPH QL: SIGNIFICANT CHANGE UP
FLUBV AG NPH QL: SIGNIFICANT CHANGE UP
GIANT PLATELETS BLD QL SMEAR: PRESENT — SIGNIFICANT CHANGE UP
GLUCOSE SERPL-MCNC: 100 MG/DL — HIGH (ref 70–99)
GLUCOSE SERPL-MCNC: 101 MG/DL — HIGH (ref 70–99)
HAPTOGLOB SERPL-MCNC: <20 MG/DL — LOW (ref 34–200)
HAPTOGLOB SERPL-MCNC: <20 MG/DL — LOW (ref 34–200)
HCT VFR BLD CALC: 14.4 % — CRITICAL LOW (ref 39–50)
HCT VFR BLD CALC: 17.3 % — CRITICAL LOW (ref 39–50)
HGB BLD-MCNC: 5.3 G/DL — CRITICAL LOW (ref 13–17)
HGB BLD-MCNC: 6.2 G/DL — CRITICAL LOW (ref 13–17)
HYPOCHROMIA BLD QL: SIGNIFICANT CHANGE UP
IANC: 4.65 K/UL — SIGNIFICANT CHANGE UP (ref 1.8–7.4)
IANC: 6.37 K/UL — SIGNIFICANT CHANGE UP (ref 1.8–7.4)
IMM GRANULOCYTES NFR BLD AUTO: 0.7 % — SIGNIFICANT CHANGE UP (ref 0–0.9)
LDH SERPL L TO P-CCNC: 1258 U/L — HIGH (ref 135–225)
LDH SERPL L TO P-CCNC: 1282 U/L — HIGH (ref 135–225)
LYMPHOCYTES # BLD AUTO: 19.6 % — SIGNIFICANT CHANGE UP (ref 13–44)
LYMPHOCYTES # BLD AUTO: 2.36 K/UL — SIGNIFICANT CHANGE UP (ref 1–3.3)
LYMPHOCYTES # BLD AUTO: 3.83 K/UL — HIGH (ref 1–3.3)
LYMPHOCYTES # BLD AUTO: 37 % — SIGNIFICANT CHANGE UP (ref 13–44)
MACROCYTES BLD QL: SLIGHT — SIGNIFICANT CHANGE UP
MCHC RBC-ENTMCNC: 30.4 PG — SIGNIFICANT CHANGE UP (ref 27–34)
MCHC RBC-ENTMCNC: 31.5 PG — SIGNIFICANT CHANGE UP (ref 27–34)
MCHC RBC-ENTMCNC: 35.8 G/DL — SIGNIFICANT CHANGE UP (ref 32–36)
MCHC RBC-ENTMCNC: 36.8 G/DL — HIGH (ref 32–36)
MCV RBC AUTO: 84.8 FL — SIGNIFICANT CHANGE UP (ref 80–100)
MCV RBC AUTO: 85.7 FL — SIGNIFICANT CHANGE UP (ref 80–100)
MICROCYTES BLD QL: SLIGHT — SIGNIFICANT CHANGE UP
MONOCYTES # BLD AUTO: 1.35 K/UL — HIGH (ref 0–0.9)
MONOCYTES # BLD AUTO: 1.59 K/UL — HIGH (ref 0–0.9)
MONOCYTES NFR BLD AUTO: 11.2 % — SIGNIFICANT CHANGE UP (ref 2–14)
MONOCYTES NFR BLD AUTO: 15.4 % — HIGH (ref 2–14)
MYELOCYTES NFR BLD: 0.9 % — HIGH (ref 0–0)
NEUTROPHILS # BLD AUTO: 4.65 K/UL — SIGNIFICANT CHANGE UP (ref 1.8–7.4)
NEUTROPHILS # BLD AUTO: 8.11 K/UL — HIGH (ref 1.8–7.4)
NEUTROPHILS NFR BLD AUTO: 44.9 % — SIGNIFICANT CHANGE UP (ref 43–77)
NEUTROPHILS NFR BLD AUTO: 67.3 % — SIGNIFICANT CHANGE UP (ref 43–77)
NRBC # BLD AUTO: 0.19 K/UL — HIGH (ref 0–0)
NRBC # BLD: 2 /100 WBCS — HIGH (ref 0–0)
NRBC # FLD: 0.19 K/UL — HIGH (ref 0–0)
NRBC BLD AUTO-RTO: 2 /100 WBCS — HIGH (ref 0–0)
NRBC BLD-RTO: 2 /100 WBCS — HIGH (ref 0–0)
OVALOCYTES BLD QL SMEAR: SIGNIFICANT CHANGE UP
PLAT MORPH BLD: NORMAL — SIGNIFICANT CHANGE UP
PLATELET # BLD AUTO: 300 K/UL — SIGNIFICANT CHANGE UP (ref 150–400)
PLATELET # BLD AUTO: 302 K/UL — SIGNIFICANT CHANGE UP (ref 150–400)
PLATELET COUNT - ESTIMATE: NORMAL — SIGNIFICANT CHANGE UP
POIKILOCYTOSIS BLD QL AUTO: SIGNIFICANT CHANGE UP
POLYCHROMASIA BLD QL SMEAR: SIGNIFICANT CHANGE UP
POTASSIUM SERPL-MCNC: 4.7 MMOL/L — SIGNIFICANT CHANGE UP (ref 3.5–5.3)
POTASSIUM SERPL-MCNC: 5.5 MMOL/L — HIGH (ref 3.5–5.3)
POTASSIUM SERPL-SCNC: 4.7 MMOL/L — SIGNIFICANT CHANGE UP (ref 3.5–5.3)
POTASSIUM SERPL-SCNC: 5.5 MMOL/L — HIGH (ref 3.5–5.3)
PROT SERPL-MCNC: 6.8 G/DL — SIGNIFICANT CHANGE UP (ref 6–8.3)
PROT SERPL-MCNC: 7.1 G/DL — SIGNIFICANT CHANGE UP (ref 6–8.3)
RBC # BLD: 1.68 M/UL — LOW (ref 4.2–5.8)
RBC # BLD: 1.68 M/UL — LOW (ref 4.2–5.8)
RBC # BLD: 2.04 M/UL — LOW (ref 4.2–5.8)
RBC # BLD: 2.04 M/UL — LOW (ref 4.2–5.8)
RBC # FLD: 24.4 % — HIGH (ref 10.3–14.5)
RBC # FLD: 27.3 % — HIGH (ref 10.3–14.5)
RBC BLD AUTO: ABNORMAL
RETICS #: 328.6 K/UL — HIGH (ref 25–125)
RETICS #: 338.3 K/UL — HIGH (ref 25–125)
RETICS/RBC NFR: 16.1 % — HIGH (ref 0.5–2.5)
RETICS/RBC NFR: 20.3 % — HIGH (ref 0.5–2.5)
RH IG SCN BLD-IMP: POSITIVE — SIGNIFICANT CHANGE UP
RSV RNA NPH QL NAA+NON-PROBE: SIGNIFICANT CHANGE UP
SARS-COV-2 RNA SPEC QL NAA+PROBE: SIGNIFICANT CHANGE UP
SCHISTOCYTES BLD QL AUTO: SLIGHT — SIGNIFICANT CHANGE UP
SICKLE CELLS BLD QL SMEAR: SIGNIFICANT CHANGE UP
SMUDGE CELLS # BLD: PRESENT — SIGNIFICANT CHANGE UP
SODIUM SERPL-SCNC: 129 MMOL/L — LOW (ref 135–145)
SODIUM SERPL-SCNC: 133 MMOL/L — LOW (ref 135–145)
WBC # BLD: 10.34 K/UL — SIGNIFICANT CHANGE UP (ref 3.8–10.5)
WBC # BLD: 12.05 K/UL — HIGH (ref 3.8–10.5)
WBC # FLD AUTO: 10.34 K/UL — SIGNIFICANT CHANGE UP (ref 3.8–10.5)
WBC # FLD AUTO: 12.05 K/UL — HIGH (ref 3.8–10.5)

## 2025-03-15 PROCEDURE — 99223 1ST HOSP IP/OBS HIGH 75: CPT

## 2025-03-15 PROCEDURE — 71275 CT ANGIOGRAPHY CHEST: CPT | Mod: 26

## 2025-03-15 PROCEDURE — 71046 X-RAY EXAM CHEST 2 VIEWS: CPT | Mod: 26

## 2025-03-15 PROCEDURE — 86077 PHYS BLOOD BANK SERV XMATCH: CPT

## 2025-03-15 RX ORDER — APIXABAN 2.5 MG/1
1 TABLET, FILM COATED ORAL
Refills: 0 | DISCHARGE

## 2025-03-15 RX ORDER — DEFERASIROX 90 MG/1
720 TABLET, FILM COATED ORAL DAILY
Refills: 0 | Status: DISCONTINUED | OUTPATIENT
Start: 2025-03-15 | End: 2025-03-21

## 2025-03-15 RX ORDER — HYDROMORPHONE/SOD CHLOR,ISO/PF 2 MG/10 ML
2 SYRINGE (ML) INJECTION ONCE
Refills: 0 | Status: DISCONTINUED | OUTPATIENT
Start: 2025-03-15 | End: 2025-03-15

## 2025-03-15 RX ORDER — ACETAMINOPHEN 500 MG/5ML
650 LIQUID (ML) ORAL EVERY 6 HOURS
Refills: 0 | Status: DISCONTINUED | OUTPATIENT
Start: 2025-03-15 | End: 2025-03-20

## 2025-03-15 RX ORDER — INFLUENZA A VIRUS A/IDAHO/07/2018 (H1N1) ANTIGEN (MDCK CELL DERIVED, PROPIOLACTONE INACTIVATED, INFLUENZA A VIRUS A/INDIANA/08/2018 (H3N2) ANTIGEN (MDCK CELL DERIVED, PROPIOLACTONE INACTIVATED), INFLUENZA B VIRUS B/SINGAPORE/INFTT-16-0610/2016 ANTIGEN (MDCK CELL DERIVED, PROPIOLACTONE INACTIVATED), INFLUENZA B VIRUS B/IOWA/06/2017 ANTIGEN (MDCK CELL DERIVED, PROPIOLACTONE INACTIVATED) 15; 15; 15; 15 UG/.5ML; UG/.5ML; UG/.5ML; UG/.5ML
0.5 INJECTION, SUSPENSION INTRAMUSCULAR ONCE
Refills: 0 | Status: DISCONTINUED | OUTPATIENT
Start: 2025-03-15 | End: 2025-05-16

## 2025-03-15 RX ORDER — HYDROMORPHONE/SOD CHLOR,ISO/PF 2 MG/10 ML
30 SYRINGE (ML) INJECTION
Refills: 0 | Status: DISCONTINUED | OUTPATIENT
Start: 2025-03-15 | End: 2025-03-15

## 2025-03-15 RX ORDER — ENOXAPARIN SODIUM 100 MG/ML
90 INJECTION SUBCUTANEOUS EVERY 12 HOURS
Refills: 0 | Status: DISCONTINUED | OUTPATIENT
Start: 2025-03-15 | End: 2025-03-18

## 2025-03-15 RX ORDER — POLYETHYLENE GLYCOL 3350 17 G/17G
17 POWDER, FOR SOLUTION ORAL DAILY
Refills: 0 | Status: DISCONTINUED | OUTPATIENT
Start: 2025-03-15 | End: 2025-03-21

## 2025-03-15 RX ORDER — FOLIC ACID 1 MG/1
1 TABLET ORAL DAILY
Refills: 0 | Status: DISCONTINUED | OUTPATIENT
Start: 2025-03-15 | End: 2025-03-21

## 2025-03-15 RX ORDER — HYDROMORPHONE/SOD CHLOR,ISO/PF 2 MG/10 ML
30 SYRINGE (ML) INJECTION
Refills: 0 | Status: DISCONTINUED | OUTPATIENT
Start: 2025-03-15 | End: 2025-03-20

## 2025-03-15 RX ORDER — BISACODYL 5 MG
5 TABLET, DELAYED RELEASE (ENTERIC COATED) ORAL AT BEDTIME
Refills: 0 | Status: DISCONTINUED | OUTPATIENT
Start: 2025-03-15 | End: 2025-03-21

## 2025-03-15 RX ORDER — NIFEDIPINE 30 MG
30 TABLET, EXTENDED RELEASE 24 HR ORAL DAILY
Refills: 0 | Status: DISCONTINUED | OUTPATIENT
Start: 2025-03-15 | End: 2025-03-20

## 2025-03-15 RX ORDER — ACETAMINOPHEN 500 MG/5ML
1000 LIQUID (ML) ORAL ONCE
Refills: 0 | Status: COMPLETED | OUTPATIENT
Start: 2025-03-15 | End: 2025-03-15

## 2025-03-15 RX ORDER — DIPHENHYDRAMINE HCL 12.5MG/5ML
50 ELIXIR ORAL ONCE
Refills: 0 | Status: COMPLETED | OUTPATIENT
Start: 2025-03-15 | End: 2025-03-15

## 2025-03-15 RX ORDER — ONDANSETRON HCL/PF 4 MG/2 ML
4 VIAL (ML) INJECTION EVERY 8 HOURS
Refills: 0 | Status: DISCONTINUED | OUTPATIENT
Start: 2025-03-15 | End: 2025-03-20

## 2025-03-15 RX ORDER — SODIUM CHLORIDE 9 G/1000ML
1000 INJECTION, SOLUTION INTRAVENOUS
Refills: 0 | Status: DISCONTINUED | OUTPATIENT
Start: 2025-03-16 | End: 2025-03-16

## 2025-03-15 RX ORDER — SODIUM CHLORIDE 9 G/1000ML
1000 INJECTION, SOLUTION INTRAVENOUS
Refills: 0 | Status: DISCONTINUED | OUTPATIENT
Start: 2025-03-15 | End: 2025-03-15

## 2025-03-15 RX ADMIN — Medication 2 MILLIGRAM(S): at 00:59

## 2025-03-15 RX ADMIN — Medication 1000 MILLIGRAM(S): at 03:25

## 2025-03-15 RX ADMIN — POLYETHYLENE GLYCOL 3350 17 GRAM(S): 17 POWDER, FOR SOLUTION ORAL at 17:57

## 2025-03-15 RX ADMIN — DEFERASIROX 720 MILLIGRAM(S): 90 TABLET, FILM COATED ORAL at 18:00

## 2025-03-15 RX ADMIN — Medication 30 MILLIGRAM(S): at 13:48

## 2025-03-15 RX ADMIN — Medication 30 MILLILITER(S): at 16:09

## 2025-03-15 RX ADMIN — Medication 2 MILLIGRAM(S): at 02:31

## 2025-03-15 RX ADMIN — Medication 100 MILLILITER(S): at 16:08

## 2025-03-15 RX ADMIN — ENOXAPARIN SODIUM 90 MILLIGRAM(S): 100 INJECTION SUBCUTANEOUS at 17:57

## 2025-03-15 RX ADMIN — Medication 2 MILLIGRAM(S): at 07:40

## 2025-03-15 RX ADMIN — Medication 2 MILLIGRAM(S): at 00:29

## 2025-03-15 RX ADMIN — Medication 50 MILLIGRAM(S): at 02:56

## 2025-03-15 RX ADMIN — Medication 2 MILLIGRAM(S): at 03:01

## 2025-03-15 RX ADMIN — Medication 2 MILLIGRAM(S): at 08:30

## 2025-03-15 RX ADMIN — Medication 2 MILLIGRAM(S): at 13:25

## 2025-03-15 RX ADMIN — Medication 100 MILLIGRAM(S): at 13:48

## 2025-03-15 RX ADMIN — Medication 30 MILLILITER(S): at 20:10

## 2025-03-15 RX ADMIN — Medication 2 MILLIGRAM(S): at 14:20

## 2025-03-15 RX ADMIN — FOLIC ACID 1 MILLIGRAM(S): 1 TABLET ORAL at 13:48

## 2025-03-15 RX ADMIN — Medication 5 MILLIGRAM(S): at 21:15

## 2025-03-15 RX ADMIN — Medication 400 MILLIGRAM(S): at 02:55

## 2025-03-15 NOTE — H&P ADULT - NSHPPHYSICALEXAM_GEN_ALL_CORE
Vital Signs Last 24 Hrs  T(C): 36.7 (15 Mar 2025 08:25), Max: 37.1 (14 Mar 2025 22:37)  T(F): 98 (15 Mar 2025 08:25), Max: 98.7 (14 Mar 2025 22:37)  HR: 75 (15 Mar 2025 08:25) (75 - 107)  BP: 124/86 (15 Mar 2025 08:25) (124/86 - 162/81)  BP(mean): --  RR: 16 (15 Mar 2025 08:25) (13 - 18)  SpO2: 100% (15 Mar 2025 08:25) (88% - 100%)    Parameters below as of 15 Mar 2025 08:25  Patient On (Oxygen Delivery Method): room air    PHYSICAL EXAM:  GENERAL: NAD, well-groomed, well-developed  HEAD:  Atraumatic, Normocephalic  EYES: EOMI, PERRLA, conjunctiva and sclera clear  ENMT: No tonsillar erythema, exudates, or enlargement; Moist mucous membranes, Good dentition, No lesions  NECK: Supple, No JVD, Normal thyroid  NERVOUS SYSTEM:  Alert & Oriented X3, Good concentration; Motor Strength 5/5 B/L upper and lower extremities; DTRs 2+ intact and symmetric  CHEST/LUNG: Clear to percussion bilaterally; No rales, rhonchi, wheezing, or rubs  HEART: Regular rate and rhythm; No murmurs, rubs, or gallops  ABDOMEN: Soft, Nontender, Nondistended; Bowel sounds present  EXTREMITIES:  2+ Peripheral Pulses, No clubbing, cyanosis, or edema  LYMPH: No lymphadenopathy noted  SKIN: No rashes or lesions Vital Signs Last 24 Hrs  T(C): 36.7 (15 Mar 2025 08:25), Max: 37.1 (14 Mar 2025 22:37)  T(F): 98 (15 Mar 2025 08:25), Max: 98.7 (14 Mar 2025 22:37)  HR: 75 (15 Mar 2025 08:25) (75 - 107)  BP: 124/86 (15 Mar 2025 08:25) (124/86 - 162/81)  BP(mean): --  RR: 16 (15 Mar 2025 08:25) (13 - 18)  SpO2: 100% (15 Mar 2025 08:25) (88% - 100%)    Parameters below as of 15 Mar 2025 08:25  Patient On (Oxygen Delivery Method): room air    PHYSICAL EXAM:  GENERAL: NAD, well-groomed, well-developed  HEAD:  Atraumatic, Normocephalic  EYES: EOMI, PERRLA, icteric sclerae  ENMT: No tonsillar erythema, exudates, or enlargement; Moist mucous membranes, Good dentition, No lesions  NECK: Supple, No JVD, Normal thyroid  NERVOUS SYSTEM:  Alert & Oriented X3, Good concentration; Motor Strength 5/5 B/L upper and lower extremities; DTRs 2+ intact and symmetric  CHEST/LUNG: Clear to percussion bilaterally; No rales, rhonchi, wheezing, or rubs  HEART: Regular rate and rhythm; No murmurs, rubs, or gallops  ABDOMEN: Soft, Nontender, Nondistended; Bowel sounds present  EXTREMITIES:  2+ Peripheral Pulses, No clubbing, cyanosis. Trace B/L LE edema  LYMPH: No lymphadenopathy noted  SKIN: No rashes or lesions

## 2025-03-15 NOTE — H&P ADULT - PROBLEM SELECTOR PLAN 5
Started on nifedipine 30mg XL QD on last admission (12/2024)  - C/w nifedipine  - Monitor BP, acceptable readings

## 2025-03-15 NOTE — H&P ADULT - ASSESSMENT
Na 129 44M with PMHx of Sickle cell disease (last transfusion/exchange transfusion 12/2024), prior acute chest syndrome, iron overload, Gout, HTN and RUE DVT 12/2024 on eliquis (on hold since 3/14 for upcoming cystoscopy, ureteroscopy on 3/19) sent in by Hematologist for transfusion/low Hg prior to procedure (Hg of 5.3). Also w/ active sickle cell crisis.   44M with PMHx of Sickle cell disease (last transfusion/exchange transfusion 12/2024), prior acute chest syndrome, iron overload, Gout, HTN and RUE DVT 12/2024 on eliquis (on hold since 3/15 for upcoming cystoscopy, ureteroscopy on 3/19) sent in by Hematologist for transfusion/low Hg prior to procedure (Hg of 5.3). Also w/ active sickle cell crisis.

## 2025-03-15 NOTE — ED ADULT NURSE REASSESSMENT NOTE - NS ED NURSE REASSESS COMMENT FT1
Break covering RN: patient received, appears to be resting comfortably in bed, no complaints noted at this time. Breathing even and unlabored, pallor/diaphoresis not noted. will continue to monitor.
Received report from YAHAIRA Romero. Patient in no acute distress, A&Ox4. He is c/o of lower back pain, med given. VS stable. Pending bed placement for admission.
Report received from Tami Rodriguez. Pt remains A&Ox4. V/s stable. No respiratory distress noted. Respirations even and unlabored. Blood tx maintained through SOREN 20g. No transfusion reactions noted. Pt safety precautions maintained. Pt care ongoing. Pt awaiting completion of blood tx.
PRBCs given. Consent in chart. Risks and benefits explained to patient. Patient verbalized understanding of risks and benefits. Patient aware of possible side effects. Second RN at bedside for confirmation.
Pt remains A&Ox4. V/s stable. No respiratory distress noted. Respirations even and unlabored. Pt SpO2 >92% on RA. Denies any chest pain, sob, headache/dizziness, or transfusion reaction. Blood transfusion completed. Pt w/ no further complaints at this time. Pt safety precautions maintained. Pt care ongoing. Awaiting adm bed.

## 2025-03-15 NOTE — H&P ADULT - PROBLEM SELECTOR PLAN 1
H/o Sickle cell disease with multiple admissions for crisis, last on 12/2024, s/p transfusion/exchange transfusion  Referred by outpatient Hematologist for transfusion prior to  procedure on 3/19, Hg 5.3 as outpatient  - S/p 1U PRBC w/ adequate response, baseline Hg around 6.0  - Also, w/ acute sickle cell crisis, will start dilaudid PCA 0.5 demand, 6 min lockout, 4H limit 20  - On oxycodone 30mg q6h PRN as outpatient based on most recent I-stop  - Monitor CBC/LDH/LFT's/reticulocytes  - Transfuse to maintain Hg >6  - C/w folic acid, IVF's, Incentive spirometer  - Bowel regimen w/ miralax/dulcolax  - Seen by Hematology ([facility]), apprec recs  - Outpatient f/[facility]

## 2025-03-15 NOTE — H&P ADULT - PROBLEM SELECTOR PLAN 7
Na of 129 on admission  - Likely in the setting of sickle cell crisis/hypovolemia  - Giving NS today, then LR starting tomorrow  - F/up BMP and urine lytes  - F/up UA

## 2025-03-15 NOTE — CONSULT NOTE ADULT - NS ATTEND AMEND GEN_ALL_CORE FT
Patient seen and examined this morning on rounds with PA.  Please see her note for details.  I am agreement with the assessment and plan.  44-year-old gentleman followed by my associate Dr. Chance  with sickle cell disease admitted for shortness of breath and associated worsening anemia.  His baseline hemoglobin is 6- 7 g.  Was noted to have a hemoglobin under 6 g and transfused 1 unit packed red blood cells.  Chest x-ray with no infiltrate.  Denies any fevers or chills.  He has lower back pain which is typical of his crisis.  At home he is on oxycodone 30 mg every 4 hours as needed.  Does not take any long-acting analgesics.  Pain is not particularly worse.  Blood work reviewed and he is actively hemolyzing.  Continue to monitor hemolysis labs including reticulocyte count, LDH and total bilirubin.  Transfuse if hemoglobin is under 6 g and/ or patient is symptomatic.  Optimize hydration.  Incentive spirometry.  Encouraged the patient to ambulate.   continue Dilaudid PCA.  Wean as tolerated

## 2025-03-15 NOTE — H&P ADULT - NSHPLABSRESULTS_GEN_ALL_CORE
.  LABS:                         5.3    12.05 )-----------( 302      ( 15 Mar 2025 00:30 )             14.4     03-15    129[L]  |  99  |  26[H]  ----------------------------<  101[H]  4.7   |  19[L]  |  1.18    Ca    9.1      15 Mar 2025 09:00    TPro  6.8  /  Alb  3.8  /  TBili  7.4[H]  /  DBili  x   /  AST  138[H]  /  ALT  52[H]  /  AlkPhos  178[H]  03-15      Urinalysis Basic - ( 15 Mar 2025 09:00 )    Color: x / Appearance: x / SG: x / pH: x  Gluc: 101 mg/dL / Ketone: x  / Bili: x / Urobili: x   Blood: x / Protein: x / Nitrite: x   Leuk Esterase: x / RBC: x / WBC x   Sq Epi: x / Non Sq Epi: x / Bacteria: x            RADIOLOGY, EKG & ADDITIONAL TESTS: Reviewed.    CTA Chest: 3/15:  -Right upper lobe new patchy nodular opacity concerning for pneumonia in   the appropriate clinical setting. Follow to resolution with repeat chest   CT in one month, or sooner as clinical warranted, to exclude lesion.   Mosaic attenuation of the lung parenchyma with multifocal lung scarring   and atelectasis is similar in appearance to prior imaging from 12/9/2024.   Additional sub-4 mm pulmonary nodules.    -No pulmonary embolus. Enlarged main pulmonary artery may reflect   pulmonary arterial hypertension. Trace to small pericardial effusion and   cardiomegaly. Aortic valve and coronary artery calcifications.    -Long segment linear calcified densities along tract of previously   removed port catheter most chronic calcified fibrin sheath. This extends   from the chest wall to the right brachiocephalic vein. Trace chronic   fluid at the site of prior port catheter reservoir    -Multiple bilateral rib fractures, chronic on the right, and in interval   stage of healing on the left, likely chronic from 12/9/2024. Stable   compression deformities of the spine. .  LABS:                         5.3    12.05 )-----------( 302      ( 15 Mar 2025 00:30 )             14.4     03-15    129[L]  |  99  |  26[H]  ----------------------------<  101[H]  4.7   |  19[L]  |  1.18    Ca    9.1      15 Mar 2025 09:00    TPro  6.8  /  Alb  3.8  /  TBili  7.4[H]  /  DBili  x   /  AST  138[H]  /  ALT  52[H]  /  AlkPhos  178[H]  03-15      Urinalysis Basic - ( 15 Mar 2025 09:00 )    Color: x / Appearance: x / SG: x / pH: x  Gluc: 101 mg/dL / Ketone: x  / Bili: x / Urobili: x   Blood: x / Protein: x / Nitrite: x   Leuk Esterase: x / RBC: x / WBC x   Sq Epi: x / Non Sq Epi: x / Bacteria: x            RADIOLOGY, EKG & ADDITIONAL TESTS: Reviewed.    CTA Chest: 3/15:  -Right upper lobe new patchy nodular opacity concerning for pneumonia in   the appropriate clinical setting. Follow to resolution with repeat chest   CT in one month, or sooner as clinical warranted, to exclude lesion.   Mosaic attenuation of the lung parenchyma with multifocal lung scarring   and atelectasis is similar in appearance to prior imaging from 12/9/2024.   Additional sub-4 mm pulmonary nodules.    -No pulmonary embolus. Enlarged main pulmonary artery may reflect   pulmonary arterial hypertension. Trace to small pericardial effusion and   cardiomegaly. Aortic valve and coronary artery calcifications.    -Long segment linear calcified densities along tract of previously   removed port catheter most chronic calcified fibrin sheath. This extends   from the chest wall to the right brachiocephalic vein. Trace chronic   fluid at the site of prior port catheter reservoir    -Multiple bilateral rib fractures, chronic on the right, and in interval   stage of healing on the left, likely chronic from 12/9/2024. Stable   compression deformities of the spine.      CT Abd 12/27/2024  Bilateral duplex collecting systems and bifid ureters. Filling defects in   the left renal collecting system as above, suspicious for urothelial   neoplasm.  Nonspecific upper abdominal and retroperitoneal lymphadenopathy, without   significant change.  Cirrhosis. Cardiomegaly.  Small right pleural effusion.  Subacute left seventh anterior rib fracture.

## 2025-03-15 NOTE — H&P ADULT - NSHPREVIEWOFSYSTEMS_GEN_ALL_CORE

## 2025-03-15 NOTE — ED PROCEDURE NOTE - ATTENDING CONTRIBUTION TO CARE
Royal Ayala DO:  patient seen and evaluated with the resident.  I was present for key portions of the History & Physical, and I agree with the Impression & Plan. usiv required for difficult access.

## 2025-03-15 NOTE — CONSULT NOTE ADULT - SUBJECTIVE AND OBJECTIVE BOX
Reason for consult: sickle cell disease    HPI:  Hematology/Oncology consulted on this 44 year old male with sickle cell disease who was sent in from outpatient office for low hemoglobin 5.3. Patient is under care of Dr. Shirley Jordan of [facility] for management of his sickle cell disease. He is currently only on folic acid and takes Jadenu for iron overload. Currently reports some low back pain consistent with his sickle cell pain. Denies chest pain or dyspnea.    PAST MEDICAL & SURGICAL HISTORY:  Sickle cell anemia      Gout      Deep vein thrombosis (DVT)      History of cholecystectomy      History of removal of Port-a-Cath          FAMILY HISTORY:  Family history of sickle cell trait (Father, Mother)    Patient's father is  (Father)    Patient's mother is  (Mother)        Alochol: Denied  Smoking: Nonsmoker  Drug Use: Denied  Marital Status:         Allergies    penicillin (Pruritus)  hydroxyurea (Other)  piperacillin-tazobactam (Urticaria)  ceftriaxone (Anaphylaxis)    Intolerances        MEDICATIONS  (STANDING):    MEDICATIONS  (PRN):      ROS  No fever, sweats, chills  No epistaxis, HA, sore throat  No CP, SOB, cough, sputum  No n/v/d, abd pain, melena, hematochezia  No edema  No rash  No anxiety  +back pain   No bleeding, bruising  No dysuria, hematuria    T(C): 36.7 (03-15-25 @ 08:25), Max: 37.1 (25 @ 22:37)  HR: 75 (03-15-25 @ 08:25) (75 - 107)  BP: 124/86 (03-15-25 @ 08:25) (124/86 - 162/81)  RR: 16 (03-15-25 @ 08:25) (13 - 18)  SpO2: 100% (03-15-25 @ 08:25) (88% - 100%)  Wt(kg): --    PE  NAD  Awake, alert  sclera icteric   No c/c/e  No rash grossly  FROM                          5.3    12.05 )-----------( 302      ( 15 Mar 2025 00:30 )             14.4       03-15    129[L]  |  99  |  26[H]  ----------------------------<  101[H]  4.7   |  19[L]  |  1.18    Ca    9.1      15 Mar 2025 09:00    TPro  6.8  /  Alb  3.8  /  TBili  7.4[H]  /  DBili  x   /  AST  138[H]  /  ALT  52[H]  /  AlkPhos  178[H]  03-15        ACC: [id number] EXAM:  XR CHEST PA LAT 2V   ORDERED BY: KHALIDA CARDONA     PROCEDURE DATE:  03/15/2025          INTERPRETATION:  EXAMINATION: XR CHEST PA AND LATERAL    CLINICAL INDICATION: Dyspnea. Concerns for acute chest.    TECHNIQUE: 2 views; Frontal and lateral views of the chest were obtained.    COMPARISON: 2024.    FINDINGS:  Heart size is not accurately assessed on this single AP projection due to   magnification. Interval stability in cardiomediastinal silhouette   contours.  No focal consolidation.  There is no pneumothorax or pleural effusion.  No acute bony abnormality.    IMPRESSION:  No focal consolidation, pneumothorax, or pleural effusion.  Interval removal of a right chest port compared to 2024    --- End of Report ---        ACC: [id number] EXAM:  CT ANGIO CHEST PULM [facility]   ORDERED BY: AZAEL DESIR     PROCEDURE DATE:  03/15/2025          INTERPRETATION:  CLINICAL INFORMATION: Evaluate for PE/acute chest   syndrome.    COMPARISON: CTA chest 2024. MRI abdomen 2024.    CONTRAST/COMPLICATIONS:  IV Contrast: Omnipaque 350  71 cc administered   29 cc discarded  Oral Contrast: NONE    PROCEDURE:  CT Angiography of the Chest.  Sagittal and coronal reformats were performed as well as 3D (MIP)   reconstructions.    FINDINGS:    LUNGS AND LARGE AIRWAYS: Central airways are patent.  Right upper lobe new patchy nodular opacity concerning for pneumonia in   the appropriate clinical setting. Mosaic attenuation of the lung   parenchyma with multifocal lung scarring and atelectasis is similar in   appearance to prior imaging from 2024. Additional sub-4 mm right   upper lobe nodule, nonspecific.  PLEURA: No pleural effusion or pneumothorax.  VESSELS: No pulmonary embolus. Main pulmonary artery is enlarged   measuring 3.4 cm, which may reflect pulmonary arterial hypertension.   Normal caliber of the thoracic aorta with scattered calcified plaque  HEART: Heart size is enlarged. Aortic valve and coronary artery   calcification. Trace to small pericardial effusion.  MEDIASTINUM AND MYRON: Prominent subcarinal/right infrahilar node is   similar to prior imaging. Otherwise, small volume nodes of the thorax.   Esophagus is nondistended  CHEST WALL AND LOWER NECK: Long segment linear calcified densities along   tract of previously removed port catheter most chronic calcified fibrin   sheath. This extends from the chest wall to the right brachiocephalic   vein. Trace chronic fluid at the site of prior port catheter reservoir.   Correlate clinically. Thyroid is poorly assessed due to streak artifact.  VISUALIZED UPPER ABDOMEN: Atrophic autoinfarcted spleen. Cirrhotic liver   and cholecystectomy clips noted. Enlarged upper abdominal nodes   redemonstrated, of unclear etiology. Previously described left renal   lesion is poorly assessed on this study. Correlate with recent abdominal   MRI findings. Left renal collecting system fullness noted. Follow-up MRI   can be obtained as clinically warranted. Additional simple and   proteinaceous left renal cysts as described on prior MRI.  BONES: Mild multilevel thoracic vertebral body height loss is unchanged.   Moderate height loss of T12 vertebral body is unchanged. Degenerative   changes and osseous demineralization of the visualized bones. Multiple   bilateral rib fractures, chronic on the right, and in interval stage of   healing on the left, likely chronic from 2024.    IMPRESSION:    -Right upper lobe new patchy nodular opacity concerning for pneumonia in   the appropriate clinical setting. Follow to resolution with repeat chest   CT in one month, or sooner as clinical warranted, to exclude lesion.   Mosaic attenuation of the lung parenchyma with multifocal lung scarring   and atelectasis is similar in appearance to prior imaging from 2024.   Correlate clinically for acute chest syndrome. Additional sub-4 mm   pulmonary nodules.    -No pulmonary embolus. Enlarged main pulmonary artery may reflect   pulmonary arterial hypertension. Trace to small pericardial effusion and   cardiomegaly. Aortic valve and coronary artery calcifications.    -Long segment linear calcified densities along tract of previously   removed port catheter most chronic calcified fibrin sheath. This extends   from the chest wall to the right brachiocephalic vein. Trace chronic   fluid at the site of prior port catheter reservoir. Correlate clinically.    -Multiple bilateral rib fractures, chronic on the right, and in interval   stage of healing on the left, likely chronic from 2024. Stable   compression deformities of the spine.    --- End of Report ---

## 2025-03-15 NOTE — H&P ADULT - PROBLEM SELECTOR PLAN 6
Hyperbilirubinemia and transaminitis  - Likely in the setting of sickle cell crisis and underlying iron overload  - Cont Jadenu for iron overload (pharmacy only has supply for next 8 days)  - Monitor LFT's closely

## 2025-03-15 NOTE — H&P ADULT - TIME BILLING
Preparing to see the patient including review of tests and other providers' notes, confirming history with patient/family member, performing medical examination and evaluation, counseling and educating the patient, ordering medications, tests and procedures, communicating with other health care professionals, documenting clinical information in the EMR, independently interpreting results and communicating results to the patient and care coordination.

## 2025-03-15 NOTE — PATIENT PROFILE ADULT - DO YOU LACK THE NECESSARY SUPPORT TO HELP YOU COPE WITH LIFE CHALLENGES?
Called pt to offer sooner surgery date of 1/10/24. Pt stated she isnt feeling well and cannot move surgery date up. She is scheduled for pre-op tomorrow, 1/9/24 and wishes to reschedule that appt. Call was transferred to PSR.  
no

## 2025-03-15 NOTE — H&P ADULT - PROBLEM SELECTOR PLAN 4
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 Seen by Urology and was scheduled for cystoscopy, left retrograde pyelogram left uteroscopy with laser ablation / biopsy , left ureteral stent on 3/19/2025  - Consulted Urology and d/w them, f/up further recs (likely to have procedure as inpatient) Imaging in Dec 2024 revealed Bilateral duplex collecting systems and bifid ureters. 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 Seen by Urology as outpatient and is scheduled for cystoscopy, left retrograde pyelogram left uteroscopy with laser ablation / biopsy , left ureteral stent on 3/19/2025  - Consulted Urology and d/w them, f/up further recs (likely to have procedure while inpatient)

## 2025-03-15 NOTE — CONSULT NOTE ADULT - ASSESSMENT
This is a 44 year old male with sickle cell disease who presents with low hemoglobin.    1. Sickle cell anemia  -- Baseline hemoglobin outpatient ~6.0, sent in for hemoglobin 5.3   -- Labs consistent with hemolysis- elevated bili, LDH  -- CXR neg. CTA chest w/o PE though RUL opacity. Patient is asymptomatic and saturating well on room air.   -- Continue with folic acid, encourage hydration, optimize pain control  -- Monitor CBC and transfuse to maintain hg >6. s/p 1 unit pRBC, awaiting repeat CBC     2. Back pain   -- Typical of his sickle cell pain   -- Outpatient he is on oxycodone 30mg PO q4h    3. Iron overload   -- Kaylie Pryor    Will continue to follow.    Liss Chan PA-C  Hematology/Oncology  [facility]  [phone] (office)  [phone] (alt office)

## 2025-03-15 NOTE — H&P ADULT - PROBLEM SELECTOR PLAN 3
During last admission was found to have RUE DVT and was started on eliquis  - Duplex 12/31: Right Internal jugular Vein and left brachial vein  - Eliquis on hold due to upcoming  procedure scheduled for 3/19  - Repeat UE Duplex  - Full dose lovenox for now During last admission was found to have RUE DVT and was started on eliquis  - Duplex 12/31: Right Internal jugular Vein and left brachial vein  - Eliquis on hold due to upcoming  procedure scheduled for 3/19  - Repeat UE Duplex  - Full dose lovenox for now, last dose of eliquis 3/14/2025

## 2025-03-15 NOTE — H&P ADULT - PROBLEM SELECTOR PLAN 2
O2 Sat 88% on Room Air on arrival to ER, placed on 4L NC, asymptomatic  - Already on RA at time of my exam and saturating well >93%  - CTA w/ no PE,  Enlarged main pulmonary artery may reflect pulmonary arterial hypertension  - CTA also shows Right upper lobe new patchy nodular opacity concerning for pneumonia, pt denies fevers/cough and lungs are clear on exam, will monitor off Abx's  - Repeat chest CT in one month as outpatient, to exclude lesion  - Incentive Spirometer  - Likely in the setting of underlying anemia  - Monitor O2 Sat closely

## 2025-03-15 NOTE — H&P ADULT - NSICDXPASTMEDICALHX_GEN_ALL_CORE_FT
PAST MEDICAL HISTORY:  Deep vein thrombosis (DVT)     Gout     Sickle cell anemia      PAST MEDICAL HISTORY:  Deep vein thrombosis (DVT)     Gout     Hypertension     Iron overload     Sickle cell anemia

## 2025-03-15 NOTE — H&P ADULT - HISTORY OF PRESENT ILLNESS
Pt arrives to [facility]" I went to my PMD yesterday rountin visit I have sickle cell, I have lower back , legs and arms and feeling very tired. My count HGB is low told to come to ER. Need transfusion."  Denies Shortness of breath denies chest pain. Pt appears pale. hx Sickle Cell , gout and arthritis. Last transfusion and exchange tranfusions Dec 2024.  abnormal lab result, hgb 5    Progress: Spoke to [facility] on-call who indicated that they would recommend a CTA as patient initially desatted to 88%.  But has been on room air for the past hour and satting at greater than 90 patient also does not have any chest pain or shortness of breath.  They do not recommned exchange transfusion. Dr. Jordan will follow in the morning.     44-year-old male with a history of sickle cell disease, prior DVT stopped Eliquis yesterday in preparation for a nephrology procedure on the 19th, who presents for a hemoglobin of 5.  Patient also endorses for the past few weeks he has been feeling more lethargic.  Also endorses pain consistent with prior sickle cell crisis pain primarily in his low back.  Patient denies any chest pain, shortness of breath, abdominal pain, nausea, vomiting, fever, chills, specific joint pain.  Was admitted in December, received transfusions at this time.  Has a history of acute chest and has required exchange transfusions in the past.  On physical exam patient appears jaundiced with scleral icterus, lungs clear to auscultation bilaterally, heart rate regular rate noted no murmurs rubs or gallops, abdomen soft nontender, bilateral lower limb pitting edema.  Concern for anemia in setting of sickle cell, may be hemolytic due to prior history of hemolysis.  Plan for CBC, CMP, retake count, LDH, haptoglobin, indirect indirect bilirubin.  Patient also desatted to 88% on room air, plan for EKG and x-ray.  Will likely need admission.    44-year-old male with a history of sickle cell disease, prior DVT stopped Eliquis yesterday in preparation for a nephrology procedure on the 19th, who presents for a hemoglobin of 5. Here anemic to 5.3, LDH 3x normal, Tbili doubled, hypoxic to 88% on RA, CXR clear lungs. Has history of iron overload and sickle cell hepatopathy requiring exchange transfusions. Prior heme notes state to keep his Hgb bet 5.5 - 6 and HgS to <30%. Heme consulted want 1 pRBC. Asked for CT PE ED   contacted [facility]      In ER /85, , RR 18, Temp 98.7F, O2 Sat 88% RA-placed on 4L NC  Received Tylenol IV, benadryl 50mg IV, dilaudid 2mg IV x3, 1U PRBC 44M with PMHx of Sickle cell disease (last transfusion/exchange transfusion 12/2024), prior acute chest syndrome, iron overload, Gout, HTN and RUE DVT 12/2024 on eliquis (on hold since 3/14 for upcoming cystoscopy, ureteroscopy on 3/19) who was sent in by his Hematologist for transfusion/low Hg prior to procedure (Hg of 5.3). Patient c/o back/LE pain at time of visit secondary to being on sickle cell crisis. Denies SOB or chest pain. No recent fevers, chills or sick contacts. No cough. No nausea/vomiting or abdominal pain. No melena or BRBPR. No urinary complaints, denies hematuria. Good appetite.    In ER /85, , RR 18, Temp 98.7F, O2 Sat 88% RA-placed on 4L NC  Received Tylenol IV, benadryl 50mg IV, dilaudid 2mg IV x3, 1U PRBC 44M with PMHx of Sickle cell disease (last transfusion/exchange transfusion 12/2024), prior acute chest syndrome, iron overload, Gout, HTN and RUE DVT 12/2024 on eliquis (on hold since 3/15 for upcoming cystoscopy, ureteroscopy on 3/19) who was sent in by his Hematologist for transfusion/low Hg prior to procedure (Hg of 5.3). Patient c/o back/LE pain at time of visit secondary to being on sickle cell crisis. Denies SOB or chest pain. No recent fevers, chills or sick contacts. No cough. No nausea/vomiting or abdominal pain. No melena or BRBPR. No urinary complaints, denies hematuria. Good appetite.    In ER /85, , RR 18, Temp 98.7F, O2 Sat 88% RA-placed on 4L NC  Received Tylenol IV, benadryl 50mg IV, dilaudid 2mg IV x3, 1U PRBC

## 2025-03-15 NOTE — CHART NOTE - NSCHARTNOTEFT_GEN_A_CORE
Patient Demographic Information (PDI)  PDI	First Name	Last Name	Birth Date	Gender	Street Address	[facility]  A	Alex Downey	1980	Male	[phone]	[place]	[address]  B	Alex Downey	1980	Male	[address]	[facility]	[id number]        PDI Filter:    PDI	My Rx	Current Rx	Drug Type	Rx Written	Rx Dispensed	Drug	Quantity	Days Supply	Prescriber Name	Prescriber NANCY #	Payment Method	Dispenser  A	N	N	O	08/02/2024	08/05/2024	oxycodone hcl (ir) 30 mg tab	180	30	Jordan, Shirley SOLITARIO	[id number]	[facility]  A	N	N	O	07/02/2024	07/15/2024	oxycodone hcl (ir) 30 mg tab	180	30	Jag Ayala	[id number]	[facility]	[place]   A	N	N	O	06/06/2024	06/19/2024	oxycodone hcl (ir) 30 mg tab	180	30	Jag Ayala	[id number]	Medicaid	[place]   A	N	N	O	05/09/2024	05/17/2024	oxycodone hcl (ir) 30 mg tab	180	30	Jordan, Shirley SOLITARIO	[id number]	Medicaid	[place]   A	N	N	O	04/11/2024	04/19/2024	oxycodone hcl (ir) 30 mg tab	180	30	Jordan, Shirley SOLITARIO	[id number]	Medicaid	[place]   A	N	N	O	03/14/2024	03/25/2024	oxycodone hcl (ir) 30 mg tab	180	30	Jordan, Shirley SOLITARIO	[id number]	Medicaid	[place]   B	N	Y	O	02/13/2025	02/14/2025	oxycodone hcl (ir) 30 mg tab	180	45	Jag Ayala	[id number]	Medicaid	[facility]  B	N	N	O	01/16/2025	01/17/2025	oxycodone hcl (ir) 30 mg tab	180	30	Jordan, Shirley SOLITARIO	[id number]	Medicaid	[facility]  B	N	N	O	11/21/2024	12/02/2024	oxycodone hcl (ir) 30 mg tab	180	30	Jordan, Shirley SOLITARIO	[id number]	Medicaid	[facility]  B	N	N	O	10/24/2024	10/29/2024	oxycodone hcl (ir) 30 mg tab	180	30	Jag Ayala	[id number]	Medicaid	[facility]  B	N	N	O	09/26/2024	09/26/2024	oxycodone hcl (ir) 30 mg tab	180	30	Jag Ayala	[id number]	Medicaid	[facility]  B	N	N	O	08/29/2024	09/04/2024	oxycodone hcl (ir) 30 mg tab	180	30	Shirley Jordan MD	[id number]	Medicaid	[facility]    * - Details of Drug Type : O = Opioid, B = Benzodiazepine, S = Stimulant

## 2025-03-15 NOTE — PATIENT PROFILE ADULT - FALL HARM RISK - HARM RISK INTERVENTIONS
Assistance with ambulation/Assistance OOB with selected safe patient handling equipment/Communicate Risk of Fall with Harm to all staff/Monitor for mental status changes/Monitor gait and stability/Reinforce activity limits and safety measures with patient and family/Reorient to person, place and time as needed/Review medications for side effects contributing to fall risk/Sit up slowly, dangle for a short time, stand at bedside before walking/Tailored Fall Risk Interventions/Use of alarms - bed, chair and/or voice tab/Visual Cue: Yellow wristband and red socks/Bed in lowest position, wheels locked, appropriate side rails in place/Call bell, personal items and telephone in reach/Instruct patient to call for assistance before getting out of bed or chair/Non-slip footwear when patient is out of bed/[place] to call system/Physically safe environment - no spills, clutter or unnecessary equipment/Purposeful Proactive Rounding/Room/bathroom lighting operational, light cord in reach

## 2025-03-16 LAB
ALBUMIN SERPL ELPH-MCNC: 3.7 G/DL — SIGNIFICANT CHANGE UP (ref 3.3–5)
ALP SERPL-CCNC: 169 U/L — HIGH (ref 40–120)
ALT FLD-CCNC: 53 U/L — HIGH (ref 4–41)
AMPHET UR-MCNC: NEGATIVE — SIGNIFICANT CHANGE UP
ANION GAP SERPL CALC-SCNC: 14 MMOL/L — SIGNIFICANT CHANGE UP (ref 7–14)
APPEARANCE UR: CLEAR — SIGNIFICANT CHANGE UP
AST SERPL-CCNC: 146 U/L — HIGH (ref 4–40)
BACTERIA # UR AUTO: NEGATIVE /HPF — SIGNIFICANT CHANGE UP
BARBITURATES UR SCN-MCNC: NEGATIVE — SIGNIFICANT CHANGE UP
BENZODIAZ UR-MCNC: NEGATIVE — SIGNIFICANT CHANGE UP
BILIRUB SERPL-MCNC: 8 MG/DL — HIGH (ref 0.2–1.2)
BILIRUB UR-MCNC: ABNORMAL
BUN SERPL-MCNC: 28 MG/DL — HIGH (ref 7–23)
CALCIUM SERPL-MCNC: 8.8 MG/DL — SIGNIFICANT CHANGE UP (ref 8.4–10.5)
CAST: 2 /LPF — SIGNIFICANT CHANGE UP (ref 0–4)
CHLORIDE SERPL-SCNC: 104 MMOL/L — SIGNIFICANT CHANGE UP (ref 98–107)
CO2 SERPL-SCNC: 18 MMOL/L — LOW (ref 22–31)
COCAINE METAB.OTHER UR-MCNC: NEGATIVE — SIGNIFICANT CHANGE UP
COLOR SPEC: ABNORMAL
CREAT SERPL-MCNC: 1.2 MG/DL — SIGNIFICANT CHANGE UP (ref 0.5–1.3)
CREATININE URINE RESULT, DAU: 58 MG/DL — SIGNIFICANT CHANGE UP
DIFF PNL FLD: ABNORMAL
EGFR: 76 ML/MIN/1.73M2 — SIGNIFICANT CHANGE UP
EGFR: 76 ML/MIN/1.73M2 — SIGNIFICANT CHANGE UP
FENTANYL UR QL SCN: NEGATIVE — SIGNIFICANT CHANGE UP
GLUCOSE SERPL-MCNC: 133 MG/DL — HIGH (ref 70–99)
GLUCOSE UR QL: NEGATIVE MG/DL — SIGNIFICANT CHANGE UP
HCT VFR BLD CALC: 16.8 % — CRITICAL LOW (ref 39–50)
HCT VFR BLD CALC: 21.2 % — LOW (ref 39–50)
HGB BLD-MCNC: 5.9 G/DL — CRITICAL LOW (ref 13–17)
HGB BLD-MCNC: 7.6 G/DL — LOW (ref 13–17)
KETONES UR-MCNC: NEGATIVE MG/DL — SIGNIFICANT CHANGE UP
LDH SERPL L TO P-CCNC: 1235 U/L — HIGH (ref 135–225)
LEUKOCYTE ESTERASE UR-ACNC: ABNORMAL
MAGNESIUM SERPL-MCNC: 1.9 MG/DL — SIGNIFICANT CHANGE UP (ref 1.6–2.6)
MCHC RBC-ENTMCNC: 29.8 PG — SIGNIFICANT CHANGE UP (ref 27–34)
MCHC RBC-ENTMCNC: 30 PG — SIGNIFICANT CHANGE UP (ref 27–34)
MCHC RBC-ENTMCNC: 35.1 G/DL — SIGNIFICANT CHANGE UP (ref 32–36)
MCHC RBC-ENTMCNC: 35.8 G/DL — SIGNIFICANT CHANGE UP (ref 32–36)
MCV RBC AUTO: 83.8 FL — SIGNIFICANT CHANGE UP (ref 80–100)
MCV RBC AUTO: 84.8 FL — SIGNIFICANT CHANGE UP (ref 80–100)
METHADONE UR-MCNC: NEGATIVE — SIGNIFICANT CHANGE UP
NITRITE UR-MCNC: POSITIVE
NRBC # BLD AUTO: 0.04 K/UL — HIGH (ref 0–0)
NRBC # BLD AUTO: 0.12 K/UL — HIGH (ref 0–0)
NRBC # FLD: 0.04 K/UL — HIGH (ref 0–0)
NRBC # FLD: 0.12 K/UL — HIGH (ref 0–0)
NRBC BLD AUTO-RTO: 0 /100 WBCS — SIGNIFICANT CHANGE UP (ref 0–0)
NRBC BLD AUTO-RTO: 1 /100 WBCS — HIGH (ref 0–0)
OPIATES UR-MCNC: POSITIVE
OSMOLALITY SERPL: 305 MOSM/KG — HIGH (ref 275–295)
OSMOLALITY UR: 350 MOSM/KG — SIGNIFICANT CHANGE UP (ref 50–1200)
OXYCODONE UR-MCNC: POSITIVE
PCP SPEC-MCNC: SIGNIFICANT CHANGE UP
PCP UR-MCNC: NEGATIVE — SIGNIFICANT CHANGE UP
PH UR: 5 — SIGNIFICANT CHANGE UP (ref 5–8)
PHOSPHATE SERPL-MCNC: 5.2 MG/DL — HIGH (ref 2.5–4.5)
PLATELET # BLD AUTO: 275 K/UL — SIGNIFICANT CHANGE UP (ref 150–400)
PLATELET # BLD AUTO: 300 K/UL — SIGNIFICANT CHANGE UP (ref 150–400)
POTASSIUM SERPL-MCNC: 5.3 MMOL/L — SIGNIFICANT CHANGE UP (ref 3.5–5.3)
POTASSIUM SERPL-SCNC: 5.3 MMOL/L — SIGNIFICANT CHANGE UP (ref 3.5–5.3)
PROT SERPL-MCNC: 6.9 G/DL — SIGNIFICANT CHANGE UP (ref 6–8.3)
PROT UR-MCNC: 300 MG/DL
RBC # BLD: 1.98 M/UL — LOW (ref 4.2–5.8)
RBC # BLD: 1.98 M/UL — LOW (ref 4.2–5.8)
RBC # BLD: 2.53 M/UL — LOW (ref 4.2–5.8)
RBC # FLD: 22 % — HIGH (ref 10.3–14.5)
RBC # FLD: 23.9 % — HIGH (ref 10.3–14.5)
RBC CASTS # UR COMP ASSIST: 21 /HPF — HIGH (ref 0–4)
RETICS #: 292.4 K/UL — HIGH (ref 25–125)
RETICS/RBC NFR: 14.8 % — HIGH (ref 0.5–2.5)
REVIEW: SIGNIFICANT CHANGE UP
SODIUM SERPL-SCNC: 136 MMOL/L — SIGNIFICANT CHANGE UP (ref 135–145)
SP GR SPEC: 1.01 — SIGNIFICANT CHANGE UP (ref 1–1.03)
SQUAMOUS # UR AUTO: 0 /HPF — SIGNIFICANT CHANGE UP (ref 0–5)
THC UR QL: POSITIVE
UROBILINOGEN FLD QL: 2 MG/DL (ref 0.2–1)
WBC # BLD: 9.32 K/UL — SIGNIFICANT CHANGE UP (ref 3.8–10.5)
WBC # BLD: 9.84 K/UL — SIGNIFICANT CHANGE UP (ref 3.8–10.5)
WBC # FLD AUTO: 9.32 K/UL — SIGNIFICANT CHANGE UP (ref 3.8–10.5)
WBC # FLD AUTO: 9.84 K/UL — SIGNIFICANT CHANGE UP (ref 3.8–10.5)
WBC UR QL: 0 /HPF — SIGNIFICANT CHANGE UP (ref 0–5)

## 2025-03-16 PROCEDURE — 99232 SBSQ HOSP IP/OBS MODERATE 35: CPT

## 2025-03-16 PROCEDURE — 99233 SBSQ HOSP IP/OBS HIGH 50: CPT

## 2025-03-16 RX ORDER — DIPHENHYDRAMINE HCL 12.5MG/5ML
50 ELIXIR ORAL ONCE
Refills: 0 | Status: COMPLETED | OUTPATIENT
Start: 2025-03-16 | End: 2025-03-16

## 2025-03-16 RX ORDER — ACETAMINOPHEN 500 MG/5ML
1000 LIQUID (ML) ORAL ONCE
Refills: 0 | Status: DISCONTINUED | OUTPATIENT
Start: 2025-03-16 | End: 2025-03-16

## 2025-03-16 RX ORDER — SODIUM CHLORIDE 9 G/1000ML
1000 INJECTION, SOLUTION INTRAVENOUS
Refills: 0 | Status: DISCONTINUED | OUTPATIENT
Start: 2025-03-16 | End: 2025-03-17

## 2025-03-16 RX ORDER — MAGNESIUM, ALUMINUM HYDROXIDE 200-200 MG
30 TABLET,CHEWABLE ORAL EVERY 4 HOURS
Refills: 0 | Status: DISCONTINUED | OUTPATIENT
Start: 2025-03-16 | End: 2025-03-21

## 2025-03-16 RX ADMIN — Medication 100 MILLILITER(S): at 07:38

## 2025-03-16 RX ADMIN — SODIUM CHLORIDE 90 MILLILITER(S): 9 INJECTION, SOLUTION INTRAVENOUS at 20:05

## 2025-03-16 RX ADMIN — SODIUM CHLORIDE 90 MILLILITER(S): 9 INJECTION, SOLUTION INTRAVENOUS at 18:01

## 2025-03-16 RX ADMIN — Medication 50 MILLIGRAM(S): at 15:27

## 2025-03-16 RX ADMIN — Medication 650 MILLIGRAM(S): at 15:53

## 2025-03-16 RX ADMIN — Medication 30 MILLILITER(S): at 19:25

## 2025-03-16 RX ADMIN — Medication 650 MILLIGRAM(S): at 10:20

## 2025-03-16 RX ADMIN — Medication 30 MILLILITER(S): at 12:00

## 2025-03-16 RX ADMIN — SODIUM CHLORIDE 90 MILLILITER(S): 9 INJECTION, SOLUTION INTRAVENOUS at 07:38

## 2025-03-16 RX ADMIN — Medication 650 MILLIGRAM(S): at 14:45

## 2025-03-16 RX ADMIN — Medication 50 MILLIGRAM(S): at 10:20

## 2025-03-16 RX ADMIN — FOLIC ACID 1 MILLIGRAM(S): 1 TABLET ORAL at 17:18

## 2025-03-16 RX ADMIN — Medication 30 MILLILITER(S): at 16:02

## 2025-03-16 RX ADMIN — DEFERASIROX 720 MILLIGRAM(S): 90 TABLET, FILM COATED ORAL at 17:17

## 2025-03-16 RX ADMIN — Medication 30 MILLIGRAM(S): at 05:12

## 2025-03-16 RX ADMIN — ENOXAPARIN SODIUM 90 MILLIGRAM(S): 100 INJECTION SUBCUTANEOUS at 17:22

## 2025-03-16 RX ADMIN — Medication 30 MILLILITER(S): at 21:25

## 2025-03-16 RX ADMIN — ENOXAPARIN SODIUM 90 MILLIGRAM(S): 100 INJECTION SUBCUTANEOUS at 05:12

## 2025-03-16 RX ADMIN — SODIUM CHLORIDE 90 MILLILITER(S): 9 INJECTION, SOLUTION INTRAVENOUS at 00:53

## 2025-03-16 RX ADMIN — Medication 5 MILLIGRAM(S): at 21:25

## 2025-03-16 RX ADMIN — Medication 100 MILLIGRAM(S): at 17:17

## 2025-03-16 RX ADMIN — Medication 650 MILLIGRAM(S): at 11:30

## 2025-03-16 RX ADMIN — Medication 100 MILLILITER(S): at 00:57

## 2025-03-16 RX ADMIN — Medication 30 MILLILITER(S): at 07:36

## 2025-03-16 NOTE — CHART NOTE - NSCHARTNOTEFT_GEN_A_CORE
Patient hemoglobin this AM was 5.9. Patient is s/p 1 unit PRBC 3/15 AM. Discussed with heme/onc, who recommend 2 units PRBC now, in addition to RUE doppler to assess previous clot. 2 units PRBC and doppler ordered. Discussed with medical attending.       Sally Duke, [facility]-BC  Department of Medicine  [facility]  [phone]

## 2025-03-16 NOTE — PROGRESS NOTE ADULT - PROBLEM SELECTOR PLAN 1
H/o Sickle cell disease with multiple admissions for crisis, last on 12/2024, s/p transfusion/exchange transfusion  Referred by outpatient Hematologist for transfusion prior to  procedure on 3/19, Hg 5.3 as outpatient  - S/p 1U PRBC, hb improved to 5.9, heme rec additional 2 units prbc  - Also, w/ acute sickle cell crisis, will start dilaudid PCA 0.5 demand, 6 min lockout, 4H limit 20  - On oxycodone 30mg q6h PRN as outpatient based on most recent I-stop  - Monitor CBC/LDH/LFT's/reticulocytes  - Transfuse to maintain Hg >6  - C/w folic acid, IVF's, Incentive spirometer  - Bowel regimen w/ miralax/dulcolax  - Seen by Hematology ([facility]), apprec recs  - [facility]

## 2025-03-16 NOTE — CONSULT NOTE ADULT - SUBJECTIVE AND OBJECTIVE BOX
44M with PMHx of Sickle cell disease (last transfusion/exchange transfusion 2024), prior acute chest syndrome, iron overload, Gout, HTN and RUE DVT 2024 on eliquis (on hold since 3/15 for upcoming cystoscopy, ureteroscopy on 3/19) who was sent in by his Hematologist for transfusion/low Hg prior to procedure (Hg of 5.3). Patient c/o back/LE pain at time of visit secondary to being on sickle cell crisis. Denies SOB or chest pain. No recent fevers, chills or sick contacts. No cough. No nausea/vomiting or abdominal pain. No melena or BRBPR. No urinary complaints, denies hematuria. Good appetite.    In ER /85, , RR 18, Temp 98.7F, O2 Sat 88% RA-placed on 4L NC  Received Tylenol IV, benadryl 50mg IV, dilaudid 2mg IV x3, 1U PRBC    Pt is a 43 yo M being followed op with Dr. Currie for new L renal mass who is currently admitted for transfusion prior to surgery/sickle cell crisis. Pt was recently found to have L renal mass during prior hospitalization, at that time also found to have UE DVT and started on eliquis. Pt is currently scheduled to have cysto/ureteroscopy w bx/laser ablation on 3/19 @ 12, has been holding AC since 3/14.       PAST MEDICAL & SURGICAL HISTORY:  Sickle cell anemia      Gout      Deep vein thrombosis (DVT)      Iron overload      Hypertension      History of cholecystectomy      History of removal of Port-a-Cath          MEDICATIONS  (STANDING):  allopurinol 100 milliGRAM(s) Oral daily  bisacodyl 5 milliGRAM(s) Oral at bedtime  deferasirox Tablet 720 milliGRAM(s) Oral daily  enoxaparin Injectable 90 milliGRAM(s) SubCutaneous every 12 hours  folic acid 1 milliGRAM(s) Oral daily  HYDROmorphone PCA (1 mG/mL) 30 milliLiter(s) PCA Continuous PCA Continuous  influenza   Vaccine 0.5 milliLiter(s) IntraMuscular once  NIFEdipine XL 30 milliGRAM(s) Oral daily  polyethylene glycol 3350 17 Gram(s) Oral daily  sodium chloride 0.45%. 1000 milliLiter(s) (90 mL/Hr) IV Continuous <Continuous>    MEDICATIONS  (PRN):  acetaminophen     Tablet .. 650 milliGRAM(s) Oral every 6 hours PRN Temp greater or equal to 38C (100.4F), Mild Pain (1 - 3)  aluminum hydroxide/magnesium hydroxide/simethicone Suspension 30 milliLiter(s) Oral every 4 hours PRN Dyspepsia  ondansetron Injectable 4 milliGRAM(s) IV Push every 8 hours PRN Nausea and/or Vomiting      FAMILY HISTORY:  Family history of sickle cell trait (Father, Mother)    Patient's father is  (Father)    Patient's mother is  (Mother)        Allergies    penicillin (Pruritus)  hydroxyurea (Other)  piperacillin-tazobactam (Urticaria)  ceftriaxone (Anaphylaxis)    Intolerances        SOCIAL HISTORY:    REVIEW OF SYSTEMS: Otherwise negative as stated in HPI    Physical Exam  Vital signs  T(C): 36.8 (25 @ 04:10), Max: 37.2 (03-15-25 @ 20:10)  HR: 105 (25 @ 05:10)  BP: 147/87 (25 @ 05:10)  SpO2: 92% (25 @ 04:10)  Wt(kg): --    Output      Gen: NAD  Pulm: No respiratory distress	  CV: RRR  GI: S/ND/NT  :   MSK: moves all 4 limbs spontaneously    LABS:       @ 04:15    WBC 9.32  / Hct 16.8  / SCr 1.20     03-15 @ 09:00    WBC 10.34 / Hct 17.3  / SCr 1.18         136  |  104  |  28[H]  ----------------------------<  133[H]  5.3   |  18[L]  |  1.20    Ca    8.8      16 Mar 2025 04:15  Phos  5.2     -  Mg     1.90         TPro  6.9  /  Alb  3.7  /  TBili  8.0[H]  /  DBili  x   /  AST  146[H]  /  ALT  53[H]  /  AlkPhos  169[H]        Urinalysis Basic - ( 16 Mar 2025 04:15 )    Color: x / Appearance: x / SG: x / pH: x  Gluc: 133 mg/dL / Ketone: x  / Bili: x / Urobili: x   Blood: x / Protein: x / Nitrite: x   Leuk Esterase: x / RBC: x / WBC x   Sq Epi: x / Non Sq Epi: x / Bacteria: x            Urine Cx:    Blood Cx:    RADIOLOGY:     44M with PMHx of Sickle cell disease (last transfusion/exchange transfusion 2024), prior acute chest syndrome, iron overload, Gout, HTN and RUE DVT 2024 on eliquis (on hold since 3/15 for upcoming cystoscopy, ureteroscopy on 3/19) who was sent in by his Hematologist for transfusion/low Hg prior to procedure (Hg of 5.3). Patient c/o back/LE pain at time of visit secondary to being on sickle cell crisis. Denies SOB or chest pain. No recent fevers, chills or sick contacts. No cough. No nausea/vomiting or abdominal pain. No melena or BRBPR. No urinary complaints, denies hematuria. Good appetite.    In ER /85, , RR 18, Temp 98.7F, O2 Sat 88% RA-placed on 4L NC  Received Tylenol IV, benadryl 50mg IV, dilaudid 2mg IV x3, 1U PRBC    Pt is a 43 yo M being followed op with Dr. Currie for new L renal mass who is currently admitted for transfusion prior to surgery/sickle cell crisis. Pt was recently found to have L renal mass during prior hospitalization, at that time also found to have UE DVT and started on eliquis. Pt is currently scheduled to have cysto/ureteroscopy w bx/laser ablation on 3/19 @ 12, has been holding AC since 3/14. Admitted for Hgb of 5.3 here for transfusion and treatment of pain crisis prior to surgery. Has negative ucx from 3/12.      PAST MEDICAL & SURGICAL HISTORY:  Sickle cell anemia      Gout      Deep vein thrombosis (DVT)      Iron overload      Hypertension      History of cholecystectomy      History of removal of Port-a-Cath          MEDICATIONS  (STANDING):  allopurinol 100 milliGRAM(s) Oral daily  bisacodyl 5 milliGRAM(s) Oral at bedtime  deferasirox Tablet 720 milliGRAM(s) Oral daily  enoxaparin Injectable 90 milliGRAM(s) SubCutaneous every 12 hours  folic acid 1 milliGRAM(s) Oral daily  HYDROmorphone PCA (1 mG/mL) 30 milliLiter(s) PCA Continuous PCA Continuous  influenza   Vaccine 0.5 milliLiter(s) IntraMuscular once  NIFEdipine XL 30 milliGRAM(s) Oral daily  polyethylene glycol 3350 17 Gram(s) Oral daily  sodium chloride 0.45%. 1000 milliLiter(s) (90 mL/Hr) IV Continuous <Continuous>    MEDICATIONS  (PRN):  acetaminophen     Tablet .. 650 milliGRAM(s) Oral every 6 hours PRN Temp greater or equal to 38C (100.4F), Mild Pain (1 - 3)  aluminum hydroxide/magnesium hydroxide/simethicone Suspension 30 milliLiter(s) Oral every 4 hours PRN Dyspepsia  ondansetron Injectable 4 milliGRAM(s) IV Push every 8 hours PRN Nausea and/or Vomiting      FAMILY HISTORY:  Family history of sickle cell trait (Father, Mother)    Patient's father is  (Father)    Patient's mother is  (Mother)        Allergies    penicillin (Pruritus)  hydroxyurea (Other)  piperacillin-tazobactam (Urticaria)  ceftriaxone (Anaphylaxis)    Intolerances        SOCIAL HISTORY:    REVIEW OF SYSTEMS: Otherwise negative as stated in HPI    Physical Exam  Vital signs  T(C): 36.8 (25 @ 04:10), Max: 37.2 (03-15-25 @ 20:10)  HR: 105 (25 @ 05:10)  BP: 147/87 (25 @ 05:10)  SpO2: 92% (25 @ 04:10)  Wt(kg): --    Output      Gen: NAD  Pulm: No respiratory distress	  CV: RRR  GI: S/ND/NT  : no flank pain   MSK: moves all 4 limbs spontaneously    LABS:       @ 04:15    WBC 9.32  / Hct 16.8  / SCr 1.20     03-15 @ 09:00    WBC 10.34 / Hct 17.3  / SCr 1.18         136  |  104  |  28[H]  ----------------------------<  133[H]  5.3   |  18[L]  |  1.20    Ca    8.8      16 Mar 2025 04:15  Phos  5.2       Mg     1.90         TPro  6.9  /  Alb  3.7  /  TBili  8.0[H]  /  DBili  x   /  AST  146[H]  /  ALT  53[H]  /  AlkPhos  169[H]        Urinalysis Basic - ( 16 Mar 2025 04:15 )    Color: x / Appearance: x / SG: x / pH: x  Gluc: 133 mg/dL / Ketone: x  / Bili: x / Urobili: x   Blood: x / Protein: x / Nitrite: x   Leuk Esterase: x / RBC: x / WBC x   Sq Epi: x / Non Sq Epi: x / Bacteria: x            Urine Cx:    Blood Cx:    RADIOLOGY:

## 2025-03-16 NOTE — PROGRESS NOTE ADULT - ASSESSMENT
This is a 44 year old male with sickle cell disease who presents with low hemoglobin.    1. Sickle cell anemia  -- Baseline hemoglobin outpatient ~6.0, sent in for hemoglobin 5.3   -- Labs consistent with hemolysis- elevated bili, LDH  -- CXR neg. CTA chest w/o PE though RUL opacity. Patient is asymptomatic and saturating well on room air.   -- Continue with folic acid, encourage hydration, optimize pain control on IV PCA   -- Monitor CBC and transfuse to maintain hg >6. s/p 1 unit pRBC, some improvement but would recommend additional 2 units pRBC    2. R IJ DVT   -- Dx 12/2024, currently on therapeutic Lovenox   -- Rec repeat US to evaluate clot, and if neg then RUE may be used for access   -- Pt will continue to need at least 6 months of anticoagulation    3. Back pain   -- Typical of his sickle cell pain   -- Outpatient he is on oxycodone 30mg PO q4h  -- on IV PCA, wean as able     4. Iron overload   -- Kaylie Pryor    Will continue to follow.    Liss Chan PA-C  Hematology/Oncology  [facility]  [phone] (office)  [phone] (alt office)

## 2025-03-16 NOTE — PROGRESS NOTE ADULT - SUBJECTIVE AND OBJECTIVE BOX
Patient is a 44y old  Male who presents with a chief complaint of Referred by Hematologist for low Hg (16 Mar 2025 13:06)      SUBJECTIVE / OVERNIGHT EVENTS: Pt seen and examined at12:10pm, no overnight events, pt reports back and leg pain, also with leg swelling, and left knee discomfort, denies any sob, chest pain, or any other complaints, getting prbc transfusion. No other new issues reported.    MEDICATIONS  (STANDING):  allopurinol 100 milliGRAM(s) Oral daily  bisacodyl 5 milliGRAM(s) Oral at bedtime  deferasirox Tablet 720 milliGRAM(s) Oral daily  enoxaparin Injectable 90 milliGRAM(s) SubCutaneous every 12 hours  folic acid 1 milliGRAM(s) Oral daily  HYDROmorphone PCA (1 mG/mL) 30 milliLiter(s) PCA Continuous PCA Continuous  influenza   Vaccine 0.5 milliLiter(s) IntraMuscular once  NIFEdipine XL 30 milliGRAM(s) Oral daily  polyethylene glycol 3350 17 Gram(s) Oral daily  sodium chloride 0.45%. 1000 milliLiter(s) (90 mL/Hr) IV Continuous <Continuous>    MEDICATIONS  (PRN):  acetaminophen     Tablet .. 650 milliGRAM(s) Oral every 6 hours PRN Temp greater or equal to 38C (100.4F), Mild Pain (1 - 3)  aluminum hydroxide/magnesium hydroxide/simethicone Suspension 30 milliLiter(s) Oral every 4 hours PRN Dyspepsia  ondansetron Injectable 4 milliGRAM(s) IV Push every 8 hours PRN Nausea and/or Vomiting      Vital Signs Last 24 Hrs  T(C): 36.6 (16 Mar 2025 15:40), Max: 37.2 (15 Mar 2025 20:10)  T(F): 97.8 (16 Mar 2025 15:40), Max: 98.9 (15 Mar 2025 20:10)  HR: 99 (16 Mar 2025 15:40) (79 - 107)  BP: 138/73 (16 Mar 2025 15:40) (128/70 - 150/78)  BP(mean): --  RR: 17 (16 Mar 2025 15:40) (17 - 18)  SpO2: 96% (16 Mar 2025 15:40) (92% - 96%)    Parameters below as of 16 Mar 2025 15:40  Patient On (Oxygen Delivery Method): room air      CAPILLARY BLOOD GLUCOSE        I&O's Summary      PHYSICAL EXAM:  GENERAL: NAD  CHEST/LUNG: Clear to auscultation bilaterally; No wheeze  HEART: Regular rate and rhythm  ABDOMEN: Soft, Nontender, Nondistended  EXTREMITIES:  b/l LE +edema, left knee with no erythema, mild swelling, nontender  PSYCH: calm  NEUROLOGY: AAOx3  SKIN: No rashes or lesions    LABS:                        5.9    9.32  )-----------( 300      ( 16 Mar 2025 04:15 )             16.8     03-16    136  |  104  |  28[H]  ----------------------------<  133[H]  5.3   |  18[L]  |  1.20    Ca    8.8      16 Mar 2025 04:15  Phos  5.2     03-16  Mg     1.90     03-16    TPro  6.9  /  Alb  3.7  /  TBili  8.0[H]  /  DBili  x   /  AST  146[H]  /  ALT  53[H]  /  AlkPhos  169[H]  03-16          Urinalysis Basic - ( 16 Mar 2025 04:15 )    Color: x / Appearance: x / SG: x / pH: x  Gluc: 133 mg/dL / Ketone: x  / Bili: x / Urobili: x   Blood: x / Protein: x / Nitrite: x   Leuk Esterase: x / RBC: x / WBC x   Sq Epi: x / Non Sq Epi: x / Bacteria: x        RADIOLOGY & ADDITIONAL TESTS:    Imaging Personally Reviewed:    Consultant(s) Notes Reviewed:      Care Discussed with Consultants/Other Providers:

## 2025-03-16 NOTE — CONSULT NOTE ADULT - ASSESSMENT
Pt is a 43 yo M w h/o SCD, UE DVT, gout, HTN, with L renal mass who is currently admitted for transfusion/treatment of sickle cell crisis prior to cystoscopy w L ureteroscopy and bx with Dr. Currie on Wednesday 3/19.     Plan   - medically optimize prior to surgery   - is on OR schedule for Wednesday at 12   - ucx from 3/12 is negative     Discussed with Dr. Currie

## 2025-03-16 NOTE — PROGRESS NOTE ADULT - ASSESSMENT
44M with PMHx of Sickle cell disease (last transfusion/exchange transfusion 12/2024), prior acute chest syndrome, iron overload, Gout, HTN and RUE DVT 12/2024 on eliquis (on hold since 3/15 for upcoming cystoscopy, ureteroscopy on 3/19) sent in by Hematologist for transfusion/low Hg prior to procedure (Hg of 5.3). Also w/ active sickle cell crisis.

## 2025-03-16 NOTE — PROGRESS NOTE ADULT - NS ATTEND AMEND GEN_ALL_CORE FT
patient seen and examined this morning on rounds with PA.  Please see her note for details.  I am in agreement with the assessment and plan.  Status post 1 unit packed red blood cell with minimal improvement.  Patient has received 2 units at a time in the past and requesting the same.  No objection and would recommend transfusion of 2 units packed red blood cell as his hemoglobin remains under 6 g.  Continue with PCA.  Pain is adequately controlled.  Continue to optimize fluid intake by mouth and ambulate.  Blood work consistent with hemolysis which is ongoing.  Continue with Jadenu for iron overload secondary to frequent transfusions

## 2025-03-17 ENCOUNTER — RESULT REVIEW (OUTPATIENT)
Age: 45
End: 2025-03-17

## 2025-03-17 LAB
ALBUMIN SERPL ELPH-MCNC: 3.6 G/DL — SIGNIFICANT CHANGE UP (ref 3.3–5)
ALP SERPL-CCNC: 162 U/L — HIGH (ref 40–120)
ALT FLD-CCNC: 40 U/L — SIGNIFICANT CHANGE UP (ref 4–41)
ANION GAP SERPL CALC-SCNC: 13 MMOL/L — SIGNIFICANT CHANGE UP (ref 7–14)
AST SERPL-CCNC: 126 U/L — HIGH (ref 4–40)
BILIRUB DIRECT SERPL-MCNC: 3.6 MG/DL — HIGH (ref 0–0.3)
BILIRUB INDIRECT FLD-MCNC: 5.2 MG/DL — HIGH (ref 0–1)
BILIRUB SERPL-MCNC: 8.8 MG/DL — HIGH (ref 0.2–1.2)
BUN SERPL-MCNC: 25 MG/DL — HIGH (ref 7–23)
CALCIUM SERPL-MCNC: 8.8 MG/DL — SIGNIFICANT CHANGE UP (ref 8.4–10.5)
CHLORIDE SERPL-SCNC: 106 MMOL/L — SIGNIFICANT CHANGE UP (ref 98–107)
CO2 SERPL-SCNC: 17 MMOL/L — LOW (ref 22–31)
CREAT SERPL-MCNC: 0.89 MG/DL — SIGNIFICANT CHANGE UP (ref 0.5–1.3)
EGFR: 108 ML/MIN/1.73M2 — SIGNIFICANT CHANGE UP
EGFR: 108 ML/MIN/1.73M2 — SIGNIFICANT CHANGE UP
GLUCOSE SERPL-MCNC: 91 MG/DL — SIGNIFICANT CHANGE UP (ref 70–99)
HCT VFR BLD CALC: 20.2 % — CRITICAL LOW (ref 39–50)
HGB BLD-MCNC: 7.1 G/DL — LOW (ref 13–17)
HIV 1+2 AB+HIV1 P24 AG SERPL QL IA: SIGNIFICANT CHANGE UP
MAGNESIUM SERPL-MCNC: 1.8 MG/DL — SIGNIFICANT CHANGE UP (ref 1.6–2.6)
MCHC RBC-ENTMCNC: 29.8 PG — SIGNIFICANT CHANGE UP (ref 27–34)
MCHC RBC-ENTMCNC: 35.1 G/DL — SIGNIFICANT CHANGE UP (ref 32–36)
MCV RBC AUTO: 84.9 FL — SIGNIFICANT CHANGE UP (ref 80–100)
NRBC # BLD AUTO: 0.11 K/UL — HIGH (ref 0–0)
NRBC # FLD: 0.11 K/UL — HIGH (ref 0–0)
NRBC BLD AUTO-RTO: 1 /100 WBCS — HIGH (ref 0–0)
PHOSPHATE SERPL-MCNC: 4.1 MG/DL — SIGNIFICANT CHANGE UP (ref 2.5–4.5)
PLATELET # BLD AUTO: 260 K/UL — SIGNIFICANT CHANGE UP (ref 150–400)
POTASSIUM SERPL-MCNC: 5.2 MMOL/L — SIGNIFICANT CHANGE UP (ref 3.5–5.3)
POTASSIUM SERPL-SCNC: 5.2 MMOL/L — SIGNIFICANT CHANGE UP (ref 3.5–5.3)
PROT SERPL-MCNC: 6.9 G/DL — SIGNIFICANT CHANGE UP (ref 6–8.3)
RBC # BLD: 2.38 M/UL — LOW (ref 4.2–5.8)
RBC # FLD: 22.3 % — HIGH (ref 10.3–14.5)
SODIUM SERPL-SCNC: 136 MMOL/L — SIGNIFICANT CHANGE UP (ref 135–145)
WBC # BLD: 10.08 K/UL — SIGNIFICANT CHANGE UP (ref 3.8–10.5)
WBC # FLD AUTO: 10.08 K/UL — SIGNIFICANT CHANGE UP (ref 3.8–10.5)

## 2025-03-17 PROCEDURE — 99233 SBSQ HOSP IP/OBS HIGH 50: CPT

## 2025-03-17 PROCEDURE — 93970 EXTREMITY STUDY: CPT | Mod: 26

## 2025-03-17 RX ORDER — SIMETHICONE 80 MG
80 TABLET,CHEWABLE ORAL ONCE
Refills: 0 | Status: COMPLETED | OUTPATIENT
Start: 2025-03-17 | End: 2025-03-17

## 2025-03-17 RX ORDER — FUROSEMIDE 10 MG/ML
20 INJECTION INTRAMUSCULAR; INTRAVENOUS ONCE
Refills: 0 | Status: COMPLETED | OUTPATIENT
Start: 2025-03-17 | End: 2025-03-17

## 2025-03-17 RX ORDER — SODIUM CHLORIDE 9 G/1000ML
1000 INJECTION, SOLUTION INTRAVENOUS
Refills: 0 | Status: DISCONTINUED | OUTPATIENT
Start: 2025-03-17 | End: 2025-03-20

## 2025-03-17 RX ADMIN — Medication 100 MILLIGRAM(S): at 13:08

## 2025-03-17 RX ADMIN — Medication 650 MILLIGRAM(S): at 22:46

## 2025-03-17 RX ADMIN — Medication 4 MILLIGRAM(S): at 21:59

## 2025-03-17 RX ADMIN — FOLIC ACID 1 MILLIGRAM(S): 1 TABLET ORAL at 13:08

## 2025-03-17 RX ADMIN — Medication 650 MILLIGRAM(S): at 21:46

## 2025-03-17 RX ADMIN — FUROSEMIDE 20 MILLIGRAM(S): 10 INJECTION INTRAMUSCULAR; INTRAVENOUS at 14:30

## 2025-03-17 RX ADMIN — Medication 5 MILLIGRAM(S): at 21:44

## 2025-03-17 RX ADMIN — ENOXAPARIN SODIUM 90 MILLIGRAM(S): 100 INJECTION SUBCUTANEOUS at 06:16

## 2025-03-17 RX ADMIN — Medication 80 MILLIGRAM(S): at 18:50

## 2025-03-17 RX ADMIN — Medication 30 MILLILITER(S): at 01:47

## 2025-03-17 RX ADMIN — Medication 30 MILLIGRAM(S): at 05:59

## 2025-03-17 RX ADMIN — Medication 30 MILLILITER(S): at 16:35

## 2025-03-17 RX ADMIN — Medication 30 MILLILITER(S): at 12:15

## 2025-03-17 RX ADMIN — DEFERASIROX 720 MILLIGRAM(S): 90 TABLET, FILM COATED ORAL at 13:08

## 2025-03-17 RX ADMIN — ENOXAPARIN SODIUM 90 MILLIGRAM(S): 100 INJECTION SUBCUTANEOUS at 17:25

## 2025-03-17 RX ADMIN — Medication 30 MILLILITER(S): at 07:40

## 2025-03-17 RX ADMIN — Medication 30 MILLILITER(S): at 19:04

## 2025-03-17 RX ADMIN — POLYETHYLENE GLYCOL 3350 17 GRAM(S): 17 POWDER, FOR SOLUTION ORAL at 13:07

## 2025-03-17 RX ADMIN — SODIUM CHLORIDE 30 MILLILITER(S): 9 INJECTION, SOLUTION INTRAVENOUS at 14:28

## 2025-03-17 NOTE — PROGRESS NOTE ADULT - SUBJECTIVE AND OBJECTIVE BOX
[facility] Division of Hospital Medicine  Jaylan Kenney MD  Pager [phone]      Patient is a 44y old  Male who presents with a chief complaint of Referred by Hematologist for low Hg (17 Mar 2025 11:48)      SUBJECTIVE / OVERNIGHT EVENTS:    reports worsening LE edema. Denies chest pain, sob. body pain controlled with pca    ADDITIONAL REVIEW OF SYSTEMS:    RESPIRATORY: No cough, wheezing, chills or hemoptysis; No shortness of breath  CARDIOVASCULAR: No chest pain, palpitations, dizziness, or leg swelling  GASTROINTESTINAL: No abdominal or epigastric pain. No nausea, vomiting, or hematemesis; No diarrhea or constipation. No melena or hematochezia.      MEDICATIONS  (STANDING):  allopurinol 100 milliGRAM(s) Oral daily  bisacodyl 5 milliGRAM(s) Oral at bedtime  deferasirox Tablet 720 milliGRAM(s) Oral daily  enoxaparin Injectable 90 milliGRAM(s) SubCutaneous every 12 hours  folic acid 1 milliGRAM(s) Oral daily  HYDROmorphone PCA (1 mG/mL) 30 milliLiter(s) PCA Continuous PCA Continuous  influenza   Vaccine 0.5 milliLiter(s) IntraMuscular once  NIFEdipine XL 30 milliGRAM(s) Oral daily  polyethylene glycol 3350 17 Gram(s) Oral daily  sodium chloride 0.45%. 1000 milliLiter(s) (30 mL/Hr) IV Continuous <Continuous>    MEDICATIONS  (PRN):  acetaminophen     Tablet .. 650 milliGRAM(s) Oral every 6 hours PRN Temp greater or equal to 38C (100.4F), Mild Pain (1 - 3)  aluminum hydroxide/magnesium hydroxide/simethicone Suspension 30 milliLiter(s) Oral every 4 hours PRN Dyspepsia  ondansetron Injectable 4 milliGRAM(s) IV Push every 8 hours PRN Nausea and/or Vomiting      CAPILLARY BLOOD GLUCOSE        I&O's Summary      PHYSICAL EXAM:  Vital Signs Last 24 Hrs  T(C): 36.7 (17 Mar 2025 13:08), Max: 37.1 (17 Mar 2025 04:00)  T(F): 98 (17 Mar 2025 13:08), Max: 98.8 (17 Mar 2025 04:00)  HR: 101 (17 Mar 2025 13:08) (91 - 106)  BP: 155/88 (17 Mar 2025 13:08) (130/80 - 160/77)  BP(mean): --  RR: 18 (17 Mar 2025 13:08) (17 - 18)  SpO2: 100% (17 Mar 2025 13:08) (96% - 100%)    Parameters below as of 17 Mar 2025 13:08  Patient On (Oxygen Delivery Method): room air        CONSTITUTIONAL: NAD,  EYES: PERRLA; conjunctiva and sclera clear  ENMT: Moist oral mucosa, no pharyngeal injection or exudates;   NECK: Supple, no palpable masses;  RESPIRATORY: Normal respiratory effort; lungs are clear to auscultation bilaterally  CARDIOVASCULAR: Regular rate and rhythm, normal S1 and S2, no murmur/rub/gallop; 2+ pedal edema; Peripheral pulses are 2+ bilaterally  ABDOMEN: Nontender to palpation, normoactive bowel sounds, no rebound/guarding;   MUSCLOSKELETAL:   no clubbing or cyanosis of digits; no joint swelling or tenderness to palpation  PSYCH: A+O to person, place, and time; affect appropriate  NEUROLOGY: CN 2-12 are intact and symmetric; no gross sensory deficits;   SKIN: No rashes;     LABS:                        7.1    10.08 )-----------( 260      ( 17 Mar 2025 05:48 )             20.2     03-17    136  |  106  |  25[H]  ----------------------------<  91  5.2   |  17[L]  |  0.89    Ca    8.8      17 Mar 2025 05:48  Phos  4.1     03-17  Mg     1.80     03-17    TPro  6.9  /  Alb  3.6  /  TBili  8.8[H]  /  DBili  3.6[H]  /  AST  126[H]  /  ALT  40  /  AlkPhos  162[H]  03-17          Urinalysis Basic - ( 17 Mar 2025 05:48 )    Color: x / Appearance: x / SG: x / pH: x  Gluc: 91 mg/dL / Ketone: x  / Bili: x / Urobili: x   Blood: x / Protein: x / Nitrite: x   Leuk Esterase: x / RBC: x / WBC x   Sq Epi: x / Non Sq Epi: x / Bacteria: x          RADIOLOGY & ADDITIONAL TESTS:  Results Reviewed:   Imaging Personally Reviewed:  Electrocardiogram Personally Reviewed:    COORDINATION OF CARE:  Care Discussed with Consultants/Other Providers [Y/N]:  Prior or Outpatient Records Reviewed [Y/N]:

## 2025-03-17 NOTE — CHART NOTE - NSCHARTNOTEFT_GEN_A_CORE
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 Confirmed with Dr. Berg, who read study, that these are likely chronic, compared to duplex from 12/2024. Patient is on Eliquis outpatient, however being held in anticipation of  procedure on 3/19 (last dose of Eliquis 3/14). Patient is on full dose Lovenox while inpatient. Discussed with medical attending, Dr. Kenney. No need to remove left peripheral IV at this time.    Sally Duke, PORSCHE-BC  Department of Medicine  [facility]  [phone]

## 2025-03-17 NOTE — CHART NOTE - NSCHARTNOTEFT_GEN_A_CORE
Discussed plan for medical optimization with urology and medical attending, Dr. Kenney.     As per urology, ideal pre-op Hgb is 8 or greater, however patient's baseline Hgb is around 6. Urology aware that Hgb of 8 may not be possible prior to OR. In addition, patient has history of iron overload (on Jadenu) and has already received a total of 3 units PRBC over the last two days. As per medical attending, plan to give 1 unit PRBC tomorrow (3/18) in anticipation of OR w/ urology on Wednesday, 3/19.       PORSCHE Sheehan-BC  Department of Medicine  [facility]  [id number]

## 2025-03-17 NOTE — PROGRESS NOTE ADULT - ASSESSMENT
Pt is a 43 yo M w h/o SCD, UE DVT, gout, HTN, with L renal mass who is currently admitted for transfusion/treatment of sickle cell crisis prior to cystoscopy w L ureteroscopy and bx with Dr. Currie on Wednesday 3/19.     Plan   - medically optimize prior to surgery   - is on OR schedule for Wednesday at 12   - ucx from 3/12 is negative   - Please document medical clearance/optimization   - Consider another unit of pRBC prior to OR for Hgb of 7.1  - patient will need to be pre-op'd tomorrow for OR wednesday with NPO s/p midnight, AM labs on 3/19 (CBC/BMP/Coags/type and screen), clearances documented     Discussed with Dr. Currie

## 2025-03-17 NOTE — PROGRESS NOTE ADULT - PROBLEM SELECTOR PLAN 1
H/o Sickle cell disease with multiple admissions for crisis, last on 12/2024, s/p transfusion/exchange transfusion  Referred by outpatient Hematologist for transfusion prior to  procedure on 3/19, Hg 5.3 as outpatient  - S/p 3 units PRBC so far. Hgb 7. plan for 1 unit PRBC tomorrow prior to OR   - c/w  dilaudid PCA 0.5 demand, 6 min lockout, 4H limit 20  - On oxycodone 30mg q6h PRN as outpatient based on most recent I-stop. on hold while on PCA   - C/w folic acid, IVF's, Incentive spirometer  - Bowel regimen w/ miralax/dulcolax  - Seen by Hematology ([facility]), apprec recs  - [facility]

## 2025-03-17 NOTE — PROGRESS NOTE ADULT - SUBJECTIVE AND OBJECTIVE BOX
Subjective  Denies fevers, chills, nausea, vomiting, SOB, CP. Patient still endorsing some residual back and knee pain. Tolerating diet.    Objective    Vital signs  T(F): , Max: 98.8 (03-17-25 @ 04:00)  HR: 106 (03-17-25 @ 04:00)  BP: 160/77 (03-17-25 @ 04:00)  SpO2: 96% (03-17-25 @ 04:00)  Wt(kg): --    Output       Gen: NAD  Abd: soft, nontender, nondistended    Labs      03-17 @ 05:48    WBC 10.08 / Hct 20.2  / SCr 0.89     03-16 @ 23:25    WBC 9.84  / Hct 21.2  / SCr --           Culture - Urine (collected 03-12-25 @ 10:15)  Source: Clean Catch Clean Catch (Midstream)  Final Report (03-13-25 @ 14:58):    No growth        Urine Cx: ?  Blood Cx: ?    Imaging

## 2025-03-17 NOTE — PROGRESS NOTE ADULT - PROBLEM SELECTOR PLAN 2
During last admission was found to have RUE DVT and was started on eliquis  - Duplex 12/31: Right Internal jugular Vein and left brachial vein.  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 no DVT LE  - Full dose lovenox for now, last dose of eliquis 3/14/2025  -lasix 20 iv x 1 3/17 for LE edema

## 2025-03-18 PROBLEM — I10 ESSENTIAL (PRIMARY) HYPERTENSION: Chronic | Status: ACTIVE | Noted: 2025-03-15

## 2025-03-18 PROBLEM — E83.19 OTHER DISORDERS OF IRON METABOLISM: Chronic | Status: ACTIVE | Noted: 2025-03-15

## 2025-03-18 LAB
ALBUMIN SERPL ELPH-MCNC: 3.6 G/DL — SIGNIFICANT CHANGE UP (ref 3.3–5)
ALP SERPL-CCNC: 154 U/L — HIGH (ref 40–120)
ALT FLD-CCNC: 35 U/L — SIGNIFICANT CHANGE UP (ref 4–41)
ANION GAP SERPL CALC-SCNC: 12 MMOL/L — SIGNIFICANT CHANGE UP (ref 7–14)
AST SERPL-CCNC: 115 U/L — HIGH (ref 4–40)
BASOPHILS # BLD AUTO: 0.12 K/UL — SIGNIFICANT CHANGE UP (ref 0–0.2)
BASOPHILS NFR BLD AUTO: 1.5 % — SIGNIFICANT CHANGE UP (ref 0–2)
BILIRUB DIRECT SERPL-MCNC: 4.1 MG/DL — HIGH (ref 0–0.3)
BILIRUB INDIRECT FLD-MCNC: 4.4 MG/DL — HIGH (ref 0–1)
BILIRUB SERPL-MCNC: 8.5 MG/DL — HIGH (ref 0.2–1.2)
BLD GP AB SCN SERPL QL: NEGATIVE — SIGNIFICANT CHANGE UP
BUN SERPL-MCNC: 25 MG/DL — HIGH (ref 7–23)
CALCIUM SERPL-MCNC: 9.4 MG/DL — SIGNIFICANT CHANGE UP (ref 8.4–10.5)
CHLORIDE SERPL-SCNC: 104 MMOL/L — SIGNIFICANT CHANGE UP (ref 98–107)
CO2 SERPL-SCNC: 21 MMOL/L — LOW (ref 22–31)
CREAT SERPL-MCNC: 0.78 MG/DL — SIGNIFICANT CHANGE UP (ref 0.5–1.3)
EGFR: 112 ML/MIN/1.73M2 — SIGNIFICANT CHANGE UP
EGFR: 112 ML/MIN/1.73M2 — SIGNIFICANT CHANGE UP
EOSINOPHIL # BLD AUTO: 0.14 K/UL — SIGNIFICANT CHANGE UP (ref 0–0.5)
EOSINOPHIL NFR BLD AUTO: 1.7 % — SIGNIFICANT CHANGE UP (ref 0–6)
GLUCOSE SERPL-MCNC: 108 MG/DL — HIGH (ref 70–99)
HAPTOGLOB SERPL-MCNC: < 20 MG/DL — SIGNIFICANT CHANGE UP (ref 34–200)
HCT VFR BLD CALC: 19.3 % — CRITICAL LOW (ref 39–50)
HGB BLD-MCNC: 7.1 G/DL — LOW (ref 13–17)
IANC: 4.28 K/UL — SIGNIFICANT CHANGE UP (ref 1.8–7.4)
IMM GRANULOCYTES NFR BLD AUTO: 0.6 % — SIGNIFICANT CHANGE UP (ref 0–0.9)
LDH SERPL L TO P-CCNC: 1121 U/L — HIGH (ref 135–225)
LYMPHOCYTES # BLD AUTO: 2.52 K/UL — SIGNIFICANT CHANGE UP (ref 1–3.3)
LYMPHOCYTES # BLD AUTO: 31.4 % — SIGNIFICANT CHANGE UP (ref 13–44)
MAGNESIUM SERPL-MCNC: 1.6 MG/DL — SIGNIFICANT CHANGE UP (ref 1.6–2.6)
MCHC RBC-ENTMCNC: 30.3 PG — SIGNIFICANT CHANGE UP (ref 27–34)
MCHC RBC-ENTMCNC: 36.8 G/DL — HIGH (ref 32–36)
MCV RBC AUTO: 82.5 FL — SIGNIFICANT CHANGE UP (ref 80–100)
MONOCYTES # BLD AUTO: 0.91 K/UL — HIGH (ref 0–0.9)
MONOCYTES NFR BLD AUTO: 11.3 % — SIGNIFICANT CHANGE UP (ref 2–14)
NEUTROPHILS # BLD AUTO: 4.28 K/UL — SIGNIFICANT CHANGE UP (ref 1.8–7.4)
NEUTROPHILS NFR BLD AUTO: 53.5 % — SIGNIFICANT CHANGE UP (ref 43–77)
NRBC # BLD AUTO: 0.02 K/UL — HIGH (ref 0–0)
NRBC # FLD: 0.02 K/UL — HIGH (ref 0–0)
NRBC BLD AUTO-RTO: 0 /100 WBCS — SIGNIFICANT CHANGE UP (ref 0–0)
PHOSPHATE SERPL-MCNC: 4.1 MG/DL — SIGNIFICANT CHANGE UP (ref 2.5–4.5)
PLATELET # BLD AUTO: 225 K/UL — SIGNIFICANT CHANGE UP (ref 150–400)
POTASSIUM SERPL-MCNC: 5.2 MMOL/L — SIGNIFICANT CHANGE UP (ref 3.5–5.3)
POTASSIUM SERPL-SCNC: 5.2 MMOL/L — SIGNIFICANT CHANGE UP (ref 3.5–5.3)
PROT SERPL-MCNC: 6.7 G/DL — SIGNIFICANT CHANGE UP (ref 6–8.3)
RBC # BLD: 2.34 M/UL — LOW (ref 4.2–5.8)
RBC # BLD: 2.34 M/UL — LOW (ref 4.2–5.8)
RBC # FLD: 21.9 % — HIGH (ref 10.3–14.5)
RETICS #: 223.2 K/UL — HIGH (ref 25–125)
RETICS/RBC NFR: 9.5 % — HIGH (ref 0.5–2.5)
RH IG SCN BLD-IMP: POSITIVE — SIGNIFICANT CHANGE UP
SODIUM SERPL-SCNC: 137 MMOL/L — SIGNIFICANT CHANGE UP (ref 135–145)
WBC # BLD: 8.02 K/UL — SIGNIFICANT CHANGE UP (ref 3.8–10.5)
WBC # FLD AUTO: 8.02 K/UL — SIGNIFICANT CHANGE UP (ref 3.8–10.5)

## 2025-03-18 PROCEDURE — 73560 X-RAY EXAM OF KNEE 1 OR 2: CPT | Mod: 26,LT

## 2025-03-18 PROCEDURE — 99233 SBSQ HOSP IP/OBS HIGH 50: CPT

## 2025-03-18 RX ORDER — FUROSEMIDE 10 MG/ML
20 INJECTION INTRAMUSCULAR; INTRAVENOUS ONCE
Refills: 0 | Status: COMPLETED | OUTPATIENT
Start: 2025-03-18 | End: 2025-03-18

## 2025-03-18 RX ORDER — DIPHENHYDRAMINE HCL 12.5MG/5ML
50 ELIXIR ORAL ONCE
Refills: 0 | Status: COMPLETED | OUTPATIENT
Start: 2025-03-18 | End: 2025-03-18

## 2025-03-18 RX ORDER — ACETAMINOPHEN 500 MG/5ML
1000 LIQUID (ML) ORAL ONCE
Refills: 0 | Status: COMPLETED | OUTPATIENT
Start: 2025-03-18 | End: 2025-03-18

## 2025-03-18 RX ADMIN — Medication 30 MILLILITER(S): at 20:57

## 2025-03-18 RX ADMIN — SODIUM CHLORIDE 30 MILLILITER(S): 9 INJECTION, SOLUTION INTRAVENOUS at 07:36

## 2025-03-18 RX ADMIN — Medication 50 MILLIGRAM(S): at 15:38

## 2025-03-18 RX ADMIN — Medication 1 APPLICATION(S): at 12:34

## 2025-03-18 RX ADMIN — Medication 100 MILLIGRAM(S): at 12:34

## 2025-03-18 RX ADMIN — Medication 30 MILLILITER(S): at 07:30

## 2025-03-18 RX ADMIN — Medication 30 MILLILITER(S): at 15:31

## 2025-03-18 RX ADMIN — DEFERASIROX 720 MILLIGRAM(S): 90 TABLET, FILM COATED ORAL at 12:34

## 2025-03-18 RX ADMIN — Medication 400 MILLIGRAM(S): at 15:38

## 2025-03-18 RX ADMIN — ENOXAPARIN SODIUM 90 MILLIGRAM(S): 100 INJECTION SUBCUTANEOUS at 05:14

## 2025-03-18 RX ADMIN — FOLIC ACID 1 MILLIGRAM(S): 1 TABLET ORAL at 12:34

## 2025-03-18 RX ADMIN — Medication 30 MILLIGRAM(S): at 05:16

## 2025-03-18 RX ADMIN — Medication 40 MILLIGRAM(S): at 21:39

## 2025-03-18 RX ADMIN — POLYETHYLENE GLYCOL 3350 17 GRAM(S): 17 POWDER, FOR SOLUTION ORAL at 12:33

## 2025-03-18 RX ADMIN — FUROSEMIDE 20 MILLIGRAM(S): 10 INJECTION INTRAMUSCULAR; INTRAVENOUS at 22:09

## 2025-03-18 NOTE — PROGRESS NOTE ADULT - PROBLEM SELECTOR PLAN 1
H/o Sickle cell disease with multiple admissions for crisis, last on 12/2024, s/p transfusion/exchange transfusion  Referred by outpatient Hematologist for transfusion prior to  procedure on 3/19, Hg 5.3 as outpatient  - S/p 3 units PRBC so far. Hgb 7. plan for 1 unit PRBC today prior to OR   - c/w  dilaudid PCA 0.5 demand, 6 min lockout, 4H limit 20  - On oxycodone 30mg q6h PRN as outpatient based on most recent I-stop. on hold while on PCA   - C/w folic acid, IVF's, Incentive spirometer  - Bowel regimen w/ miralax/dulcolax  - Seen by Hematology ([facility]), apprec recs  - Outpatient f/up with Dr. Shirley Jordan of [facility]

## 2025-03-18 NOTE — PROGRESS NOTE ADULT - SUBJECTIVE AND OBJECTIVE BOX
[facility] Division of Hospital Medicine  Jaylan Kenney MD  Pager [phone]      Patient is a 44y old  Male who presents with a chief complaint of Referred by Hematologist for low Hg (18 Mar 2025 07:06)      SUBJECTIVE / OVERNIGHT EVENTS:    no acute event o/n. reports pedal edema much improved after lasix. denies chest pain, sob.     ADDITIONAL REVIEW OF SYSTEMS:    RESPIRATORY: No cough, wheezing, chills or hemoptysis; No shortness of breath  CARDIOVASCULAR: No chest pain, palpitations, dizziness, or leg swelling  GASTROINTESTINAL: No abdominal or epigastric pain. No nausea, vomiting, or hematemesis; No diarrhea or constipation. No melena or hematochezia.    MEDICATIONS  (STANDING):  acetaminophen   IVPB .. 1000 milliGRAM(s) IV Intermittent once  allopurinol 100 milliGRAM(s) Oral daily  bisacodyl 5 milliGRAM(s) Oral at bedtime  deferasirox Tablet 720 milliGRAM(s) Oral daily  diphenhydrAMINE Injectable 50 milliGRAM(s) IV Push once  folic acid 1 milliGRAM(s) Oral daily  furosemide   Injectable 20 milliGRAM(s) IV Push once  HYDROmorphone PCA (1 mG/mL) 30 milliLiter(s) PCA Continuous PCA Continuous  influenza   Vaccine 0.5 milliLiter(s) IntraMuscular once  NIFEdipine XL 30 milliGRAM(s) Oral daily  polyethylene glycol 3350 17 Gram(s) Oral daily  sodium chloride 0.45%. 1000 milliLiter(s) (30 mL/Hr) IV Continuous <Continuous>    MEDICATIONS  (PRN):  acetaminophen     Tablet .. 650 milliGRAM(s) Oral every 6 hours PRN Temp greater or equal to 38C (100.4F), Mild Pain (1 - 3)  aluminum hydroxide/magnesium hydroxide/simethicone Suspension 30 milliLiter(s) Oral every 4 hours PRN Dyspepsia  ondansetron Injectable 4 milliGRAM(s) IV Push every 8 hours PRN Nausea and/or Vomiting      CAPILLARY BLOOD GLUCOSE        I&O's Summary      PHYSICAL EXAM:  Vital Signs Last 24 Hrs  T(C): 36.8 (18 Mar 2025 04:00), Max: 36.8 (17 Mar 2025 20:00)  T(F): 98.2 (18 Mar 2025 04:00), Max: 98.2 (17 Mar 2025 20:00)  HR: 93 (18 Mar 2025 04:00) (93 - 112)  BP: 143/86 (18 Mar 2025 04:00) (143/86 - 162/83)  BP(mean): --  RR: 18 (18 Mar 2025 04:00) (18 - 18)  SpO2: 98% (18 Mar 2025 04:00) (98% - 100%)    Parameters below as of 18 Mar 2025 04:00  Patient On (Oxygen Delivery Method): room air        CONSTITUTIONAL: NAD,  EYES: PERRLA; conjunctiva and sclera clear  ENMT: Moist oral mucosa, no pharyngeal injection or exudates;   NECK: Supple, no palpable masses;  RESPIRATORY: Normal respiratory effort; lungs are clear to auscultation bilaterally  CARDIOVASCULAR: Regular rate and rhythm, normal S1 and S2, no murmur/rub/gallop; 1+ pedal edema; Peripheral pulses are 2+ bilaterally  ABDOMEN: Nontender to palpation, normoactive bowel sounds, no rebound/guarding;   MUSCLOSKELETAL:   no clubbing or cyanosis of digits; no joint swelling or tenderness to palpation  PSYCH: A+O to person, place, and time; affect appropriate  NEUROLOGY: CN 2-12 are intact and symmetric; no gross sensory deficits;   SKIN: No rashes;     LABS:                        7.1    8.02  )-----------( 225      ( 18 Mar 2025 05:32 )             19.3     03-18    137  |  104  |  25[H]  ----------------------------<  108[H]  5.2   |  21[L]  |  0.78    Ca    9.4      18 Mar 2025 05:32  Phos  4.1     03-18  Mg     1.60     03-18    TPro  6.7  /  Alb  3.6  /  TBili  8.5[H]  /  DBili  4.1[H]  /  AST  115[H]  /  ALT  35  /  AlkPhos  154[H]  03-18          Urinalysis Basic - ( 18 Mar 2025 05:32 )    Color: x / Appearance: x / SG: x / pH: x  Gluc: 108 mg/dL / Ketone: x  / Bili: x / Urobili: x   Blood: x / Protein: x / Nitrite: x   Leuk Esterase: x / RBC: x / WBC x   Sq Epi: x / Non Sq Epi: x / Bacteria: x          RADIOLOGY & ADDITIONAL TESTS:  Results Reviewed:   Imaging Personally Reviewed:  Electrocardiogram Personally Reviewed:    COORDINATION OF CARE:  Care Discussed with Consultants/Other Providers [Y/N]:  Prior or Outpatient Records Reviewed [Y/N]:

## 2025-03-18 NOTE — PROGRESS NOTE ADULT - SUBJECTIVE AND OBJECTIVE BOX
Subjective  Denies fevers, chills, nausea, vomiting, SOB, CP.    Objective    Vital signs  T(F): , Max: 98.2 (03-17-25 @ 20:00)  HR: 93 (03-18-25 @ 04:00)  BP: 143/86 (03-18-25 @ 04:00)  SpO2: 98% (03-18-25 @ 04:00)  Wt(kg): --    Output       Gen: NAD  Abd: soft, nontender, nondistended    Labs      03-18 @ 05:32    WBC 8.02  / Hct 19.3  / SCr 0.78     03-17 @ 05:48    WBC 10.08 / Hct 20.2  / SCr 0.89         Culture - Urine (collected 03-12-25 @ 10:15)  Source: Clean Catch Clean Catch (Midstream)  Final Report (03-13-25 @ 14:58):    No growth        Urine Cx: ?  Blood Cx: ?    Imaging

## 2025-03-18 NOTE — PROGRESS NOTE ADULT - ASSESSMENT
Pt is a 45 yo M w h/o SCD, UE DVT, gout, HTN, with L renal mass who is currently admitted for transfusion/treatment of sickle cell crisis prior to cystoscopy w L ureteroscopy and bx with Dr. Currie on Wednesday 3/19.     Plan   - medically optimize prior to surgery   - urine culture from 3/12 negative, no need for further cultures   - Please document medical clearance/optimization   - Consider another unit of pRBC prior to OR for Hgb of 7.1, planned for 1u pRBC today  - patient will need to be pre-op'd tomorrow for OR wednesday with NPO s/p midnight, AM labs on 3/19 (CBC/BMP/Coags/type and screen), clearances documented   - Will consent patient today    Discussed with Dr. Currie

## 2025-03-18 NOTE — PROGRESS NOTE ADULT - ASSESSMENT
This is a 44 year old male with sickle cell disease who presents with low hemoglobin.    1. Sickle cell anemia  -- Baseline hemoglobin outpatient ~6.0, sent in for hemoglobin 5.3   -- Labs consistent with hemolysis- elevated bili, LDH  -- CXR neg. CTA chest w/o PE though RUL opacity. Patient is asymptomatic and saturating well on room air.   -- Continue with folic acid, encourage hydration, optimize pain control on IV PCA   -- Monitor CBC and transfuse to maintain hg >6. s/p 1 unit pRBC, some improvement but would recommend additional 2 units pRBC    2. R IJ DVT   -- Dx 12/2024, currently on therapeutic Lovenox   -- Rec repeat US to evaluate clot, and if neg then RUE may be used for access   -- Pt will continue to need at least 6 months of anticoagulation    3. Back pain   -- Typical of his sickle cell pain   -- Outpatient he is on oxycodone 30mg PO q4h  -- on IV PCA, wean as able     4. Iron overload   -- Cont Jadenu    5. r/o malignancy   -- Prior imaging with concern for urothelial malignancy   -- Urology following, plan cystoscopy with ureteroscopy and biopsy tomorrow, will follow up pathology     Will continue to follow.    Liss Chan PA-C  Hematology/Oncology  [facility]  [phone] (office)  [phone] (alt office)

## 2025-03-18 NOTE — PROGRESS NOTE ADULT - PROBLEM SELECTOR PLAN 2
During last admission was found to have RUE DVT and was started on eliquis  - Duplex 12/31: Right Internal jugular Vein and left brachial vein.  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 no DVT LE  - on Full dose lovenox inpt. hold  dose this pm until procedure. f/u  postop when to resume   -s/p lasix 20 iv x 1 3/17 for LE edema. repeat dose today after pRBC

## 2025-03-18 NOTE — PROGRESS NOTE ADULT - SUBJECTIVE AND OBJECTIVE BOX
Patient is a 44y old  Male who presents with a chief complaint of Referred by Hematologist for low Hg (18 Mar 2025 10:50)    Pt seen this morning. He feels fine, no complaints currently.    MEDICATIONS  (STANDING):  acetaminophen   IVPB .. 1000 milliGRAM(s) IV Intermittent once  allopurinol 100 milliGRAM(s) Oral daily  bisacodyl 5 milliGRAM(s) Oral at bedtime  chlorhexidine 2% Cloths 1 Application(s) Topical daily  deferasirox Tablet 720 milliGRAM(s) Oral daily  diphenhydrAMINE Injectable 50 milliGRAM(s) IV Push once  folic acid 1 milliGRAM(s) Oral daily  furosemide   Injectable 20 milliGRAM(s) IV Push once  HYDROmorphone PCA (1 mG/mL) 30 milliLiter(s) PCA Continuous PCA Continuous  influenza   Vaccine 0.5 milliLiter(s) IntraMuscular once  NIFEdipine XL 30 milliGRAM(s) Oral daily  polyethylene glycol 3350 17 Gram(s) Oral daily  sodium chloride 0.45%. 1000 milliLiter(s) (30 mL/Hr) IV Continuous <Continuous>    MEDICATIONS  (PRN):  acetaminophen     Tablet .. 650 milliGRAM(s) Oral every 6 hours PRN Temp greater or equal to 38C (100.4F), Mild Pain (1 - 3)  aluminum hydroxide/magnesium hydroxide/simethicone Suspension 30 milliLiter(s) Oral every 4 hours PRN Dyspepsia  ondansetron Injectable 4 milliGRAM(s) IV Push every 8 hours PRN Nausea and/or Vomiting        Vital Signs Last 24 Hrs  T(C): 36.8 (18 Mar 2025 04:00), Max: 36.8 (17 Mar 2025 20:00)  T(F): 98.2 (18 Mar 2025 04:00), Max: 98.2 (17 Mar 2025 20:00)  HR: 93 (18 Mar 2025 04:00) (93 - 112)  BP: 143/86 (18 Mar 2025 04:00) (143/86 - 162/83)  BP(mean): --  RR: 18 (18 Mar 2025 04:00) (18 - 18)  SpO2: 98% (18 Mar 2025 04:00) (98% - 100%)    Parameters below as of 18 Mar 2025 04:00  Patient On (Oxygen Delivery Method): room air        PE  NAD  Awake, alert  Anicteric, MMM  No c/c/e  No rash grossly  FROM                          7.1    8.02  )-----------( 225      ( 18 Mar 2025 05:32 )             19.3       03-18    137  |  104  |  25[H]  ----------------------------<  108[H]  5.2   |  21[L]  |  0.78    Ca    9.4      18 Mar 2025 05:32  Phos  4.1     03-18  Mg     1.60     03-18    TPro  6.7  /  Alb  3.6  /  TBili  8.5[H]  /  DBili  4.1[H]  /  AST  115[H]  /  ALT  35  /  AlkPhos  154[H]  03-18

## 2025-03-19 ENCOUNTER — APPOINTMENT (OUTPATIENT)
Dept: UROLOGY | Facility: HOSPITAL | Age: 45
End: 2025-03-19

## 2025-03-19 ENCOUNTER — RESULT REVIEW (OUTPATIENT)
Age: 45
End: 2025-03-19

## 2025-03-19 ENCOUNTER — TRANSCRIPTION ENCOUNTER (OUTPATIENT)
Age: 45
End: 2025-03-19

## 2025-03-19 LAB
ALBUMIN SERPL ELPH-MCNC: 3.8 G/DL — SIGNIFICANT CHANGE UP (ref 3.3–5)
ALP SERPL-CCNC: 152 U/L — HIGH (ref 40–120)
ALT FLD-CCNC: 34 U/L — SIGNIFICANT CHANGE UP (ref 4–41)
ANION GAP SERPL CALC-SCNC: 9 MMOL/L — SIGNIFICANT CHANGE UP (ref 7–14)
APTT BLD: 35.3 SEC — SIGNIFICANT CHANGE UP (ref 24.5–35.6)
AST SERPL-CCNC: 127 U/L — HIGH (ref 4–40)
BILIRUB SERPL-MCNC: 6.7 MG/DL — HIGH (ref 0.2–1.2)
BLD GP AB SCN SERPL QL: NEGATIVE — SIGNIFICANT CHANGE UP
BUN SERPL-MCNC: 27 MG/DL — HIGH (ref 7–23)
CALCIUM SERPL-MCNC: 9.4 MG/DL — SIGNIFICANT CHANGE UP (ref 8.4–10.5)
CHLORIDE SERPL-SCNC: 104 MMOL/L — SIGNIFICANT CHANGE UP (ref 98–107)
CO2 SERPL-SCNC: 24 MMOL/L — SIGNIFICANT CHANGE UP (ref 22–31)
CREAT SERPL-MCNC: 0.85 MG/DL — SIGNIFICANT CHANGE UP (ref 0.5–1.3)
EGFR: 109 ML/MIN/1.73M2 — SIGNIFICANT CHANGE UP
EGFR: 109 ML/MIN/1.73M2 — SIGNIFICANT CHANGE UP
GLUCOSE SERPL-MCNC: 84 MG/DL — SIGNIFICANT CHANGE UP (ref 70–99)
HAV IGM SER-ACNC: SIGNIFICANT CHANGE UP
HBV CORE IGM SER-ACNC: SIGNIFICANT CHANGE UP
HBV SURFACE AG SER-ACNC: SIGNIFICANT CHANGE UP
HCT VFR BLD CALC: 22.5 % — LOW (ref 39–50)
HCV AB S/CO SERPL IA: 0.1 S/CO — SIGNIFICANT CHANGE UP (ref 0–0.79)
HCV AB SERPL-IMP: SIGNIFICANT CHANGE UP
HGB BLD-MCNC: 8 G/DL — LOW (ref 13–17)
INR BLD: 1.05 RATIO — SIGNIFICANT CHANGE UP (ref 0.85–1.16)
MAGNESIUM SERPL-MCNC: 1.6 MG/DL — SIGNIFICANT CHANGE UP (ref 1.6–2.6)
MCHC RBC-ENTMCNC: 29.5 PG — SIGNIFICANT CHANGE UP (ref 27–34)
MCHC RBC-ENTMCNC: 35.6 G/DL — SIGNIFICANT CHANGE UP (ref 32–36)
MCV RBC AUTO: 83 FL — SIGNIFICANT CHANGE UP (ref 80–100)
NRBC # BLD AUTO: 0.03 K/UL — HIGH (ref 0–0)
NRBC # FLD: 0.03 K/UL — HIGH (ref 0–0)
NRBC BLD AUTO-RTO: 0 /100 WBCS — SIGNIFICANT CHANGE UP (ref 0–0)
PLATELET # BLD AUTO: 249 K/UL — SIGNIFICANT CHANGE UP (ref 150–400)
POTASSIUM SERPL-MCNC: 5 MMOL/L — SIGNIFICANT CHANGE UP (ref 3.5–5.3)
POTASSIUM SERPL-SCNC: 5 MMOL/L — SIGNIFICANT CHANGE UP (ref 3.5–5.3)
PROT SERPL-MCNC: 7 G/DL — SIGNIFICANT CHANGE UP (ref 6–8.3)
PROTHROM AB SERPL-ACNC: 12.5 SEC — SIGNIFICANT CHANGE UP (ref 9.9–13.4)
RBC # BLD: 2.71 M/UL — LOW (ref 4.2–5.8)
RBC # FLD: 21.1 % — HIGH (ref 10.3–14.5)
RH IG SCN BLD-IMP: POSITIVE — SIGNIFICANT CHANGE UP
SODIUM SERPL-SCNC: 137 MMOL/L — SIGNIFICANT CHANGE UP (ref 135–145)
WBC # BLD: 8.01 K/UL — SIGNIFICANT CHANGE UP (ref 3.8–10.5)
WBC # FLD AUTO: 8.01 K/UL — SIGNIFICANT CHANGE UP (ref 3.8–10.5)

## 2025-03-19 PROCEDURE — 88305 TISSUE EXAM BY PATHOLOGIST: CPT | Mod: 26

## 2025-03-19 PROCEDURE — 74420 UROGRAPHY RTRGR +-KUB: CPT | Mod: 26

## 2025-03-19 PROCEDURE — 99233 SBSQ HOSP IP/OBS HIGH 50: CPT

## 2025-03-19 PROCEDURE — 52332 CYSTOSCOPY AND TREATMENT: CPT | Mod: LT

## 2025-03-19 PROCEDURE — 52354 CYSTOURETERO W/BIOPSY: CPT | Mod: LT

## 2025-03-19 PROCEDURE — 88112 CYTOPATH CELL ENHANCE TECH: CPT | Mod: 26

## 2025-03-19 RX ORDER — DIPHENHYDRAMINE HCL 12.5MG/5ML
25 ELIXIR ORAL ONCE
Refills: 0 | Status: COMPLETED | OUTPATIENT
Start: 2025-03-19 | End: 2025-03-19

## 2025-03-19 RX ORDER — OXYBUTYNIN CHLORIDE 5 MG/1
5 TABLET, FILM COATED, EXTENDED RELEASE ORAL EVERY 8 HOURS
Refills: 0 | Status: DISCONTINUED | OUTPATIENT
Start: 2025-03-19 | End: 2025-03-21

## 2025-03-19 RX ORDER — HYDROMORPHONE/SOD CHLOR,ISO/PF 2 MG/10 ML
0.5 SYRINGE (ML) INJECTION ONCE
Refills: 0 | Status: DISCONTINUED | OUTPATIENT
Start: 2025-03-19 | End: 2025-03-19

## 2025-03-19 RX ORDER — SODIUM CHLORIDE 9 G/1000ML
1000 INJECTION, SOLUTION INTRAVENOUS ONCE
Refills: 0 | Status: COMPLETED | OUTPATIENT
Start: 2025-03-19 | End: 2025-03-19

## 2025-03-19 RX ORDER — DIAZEPAM 2 MG/1
2 TABLET ORAL ONCE
Refills: 0 | Status: DISCONTINUED | OUTPATIENT
Start: 2025-03-19 | End: 2025-03-19

## 2025-03-19 RX ORDER — ONDANSETRON HCL/PF 4 MG/2 ML
4 VIAL (ML) INJECTION ONCE
Refills: 0 | Status: COMPLETED | OUTPATIENT
Start: 2025-03-19 | End: 2025-03-19

## 2025-03-19 RX ORDER — ACETAMINOPHEN 500 MG/5ML
1000 LIQUID (ML) ORAL ONCE
Refills: 0 | Status: COMPLETED | OUTPATIENT
Start: 2025-03-19 | End: 2025-03-19

## 2025-03-19 RX ORDER — TAMSULOSIN HYDROCHLORIDE 0.4 MG/1
0.4 CAPSULE ORAL AT BEDTIME
Refills: 0 | Status: DISCONTINUED | OUTPATIENT
Start: 2025-03-19 | End: 2025-03-21

## 2025-03-19 RX ORDER — LABETALOL HYDROCHLORIDE 200 MG/1
10 TABLET, FILM COATED ORAL ONCE
Refills: 0 | Status: COMPLETED | OUTPATIENT
Start: 2025-03-19 | End: 2025-03-19

## 2025-03-19 RX ORDER — DIAZEPAM 2 MG/1
5 TABLET ORAL ONCE
Refills: 0 | Status: DISCONTINUED | OUTPATIENT
Start: 2025-03-19 | End: 2025-03-19

## 2025-03-19 RX ORDER — MIDAZOLAM IN 0.9 % SOD.CHLORID 1 MG/ML
1 PLASTIC BAG, INJECTION (ML) INTRAVENOUS ONCE
Refills: 0 | Status: DISCONTINUED | OUTPATIENT
Start: 2025-03-19 | End: 2025-03-19

## 2025-03-19 RX ORDER — SODIUM CHLORIDE 9 G/1000ML
1000 INJECTION, SOLUTION INTRAVENOUS
Refills: 0 | Status: DISCONTINUED | OUTPATIENT
Start: 2025-03-19 | End: 2025-03-19

## 2025-03-19 RX ORDER — HYDROMORPHONE/SOD CHLOR,ISO/PF 2 MG/10 ML
0.5 SYRINGE (ML) INJECTION
Refills: 0 | Status: DISCONTINUED | OUTPATIENT
Start: 2025-03-19 | End: 2025-03-19

## 2025-03-19 RX ADMIN — SODIUM CHLORIDE 75 MILLILITER(S): 9 INJECTION, SOLUTION INTRAVENOUS at 15:12

## 2025-03-19 RX ADMIN — Medication 0.5 MILLIGRAM(S): at 15:03

## 2025-03-19 RX ADMIN — DIAZEPAM 2 MILLIGRAM(S): 2 TABLET ORAL at 16:18

## 2025-03-19 RX ADMIN — DEFERASIROX 720 MILLIGRAM(S): 90 TABLET, FILM COATED ORAL at 19:49

## 2025-03-19 RX ADMIN — Medication 20 MILLIGRAM(S): at 16:14

## 2025-03-19 RX ADMIN — SODIUM CHLORIDE 75 MILLILITER(S): 9 INJECTION, SOLUTION INTRAVENOUS at 15:00

## 2025-03-19 RX ADMIN — LABETALOL HYDROCHLORIDE 10 MILLIGRAM(S): 200 TABLET, FILM COATED ORAL at 19:48

## 2025-03-19 RX ADMIN — Medication 100 MILLIGRAM(S): at 19:48

## 2025-03-19 RX ADMIN — Medication 1000 MILLIGRAM(S): at 17:30

## 2025-03-19 RX ADMIN — Medication 30 MILLILITER(S): at 19:52

## 2025-03-19 RX ADMIN — Medication 30 MILLIGRAM(S): at 05:47

## 2025-03-19 RX ADMIN — SODIUM CHLORIDE 4000 MILLILITER(S): 9 INJECTION, SOLUTION INTRAVENOUS at 19:50

## 2025-03-19 RX ADMIN — Medication 0.5 MILLIGRAM(S): at 15:30

## 2025-03-19 RX ADMIN — OXYBUTYNIN CHLORIDE 5 MILLIGRAM(S): 5 TABLET, FILM COATED, EXTENDED RELEASE ORAL at 17:55

## 2025-03-19 RX ADMIN — FOLIC ACID 1 MILLIGRAM(S): 1 TABLET ORAL at 19:49

## 2025-03-19 RX ADMIN — Medication 4 MILLIGRAM(S): at 15:22

## 2025-03-19 RX ADMIN — TAMSULOSIN HYDROCHLORIDE 0.4 MILLIGRAM(S): 0.4 CAPSULE ORAL at 19:51

## 2025-03-19 RX ADMIN — Medication 30 MILLILITER(S): at 15:12

## 2025-03-19 RX ADMIN — Medication 30 MILLILITER(S): at 08:22

## 2025-03-19 RX ADMIN — Medication 25 MILLIGRAM(S): at 16:17

## 2025-03-19 RX ADMIN — Medication 30 MILLILITER(S): at 10:53

## 2025-03-19 RX ADMIN — Medication 0.5 MILLIGRAM(S): at 16:55

## 2025-03-19 RX ADMIN — Medication 400 MILLIGRAM(S): at 17:15

## 2025-03-19 RX ADMIN — Medication 0.5 MILLIGRAM(S): at 18:15

## 2025-03-19 RX ADMIN — Medication 30 MILLILITER(S): at 21:23

## 2025-03-19 RX ADMIN — DIAZEPAM 5 MILLIGRAM(S): 2 TABLET ORAL at 17:55

## 2025-03-19 RX ADMIN — Medication 30 MILLILITER(S): at 12:47

## 2025-03-19 NOTE — PROGRESS NOTE ADULT - PROBLEM SELECTOR PLAN 1
H/o Sickle cell disease with multiple admissions for crisis, last on 12/2024, s/p transfusion/exchange transfusion  Referred by outpatient Hematologist for transfusion prior to  procedure on 3/19, Hg 5.3 as outpatient  - S/p 3 units PRBC so far. Hgb 7. plan for 1 unit PRBC today prior to OR   - c/w  dilaudid PCA 0.5 demand, 6 min lockout, 4H limit 20  - On oxycodone 30mg q6h PRN as outpatient based on most recent I-stop. on hold while on PCA   - C/w folic acid, IVF's, Incentive spirometer  - Bowel regimen w/ miralax/dulcolax  - Seen by Hematology ([facility]), apprec recs  - Outpatient f/up with Dr. Shirley Jordan of [facility]

## 2025-03-19 NOTE — PROGRESS NOTE ADULT - SUBJECTIVE AND OBJECTIVE BOX
Subjective  Denies fevers, chills, nausea, vomiting, SOB, CP. NPO for OR today. Patient s/p 1u PRBC yesterday for Hgb of 7.1.     Objective    Vital signs  T(F): , Max: 98.3 (03-18-25 @ 18:00)  HR: 85 (03-19-25 @ 05:45)  BP: 157/88 (03-19-25 @ 05:45)  SpO2: 99% (03-19-25 @ 05:45)  Wt(kg): --    Output     OUT:    Voided (mL): 600 mL  Total OUT: 600 mL    Total NET: -600 mL          Gen: NAD  Abd: soft, nontender, nondistended    Labs      03-18 @ 05:32    WBC 8.02  / Hct 19.3  / SCr 0.78         Culture - Urine (collected 03-12-25 @ 10:15)  Source: Clean Catch Clean Catch (Midstream)  Final Report (03-13-25 @ 14:58):    No growth        Urine Cx: ?  Blood Cx: ?    Imaging

## 2025-03-19 NOTE — PROGRESS NOTE ADULT - ASSESSMENT
44y M s/p  L ureteroscopy, RGP, biopsy, stent placement L kidney wash, L kidney lower pole mass biopsy

## 2025-03-19 NOTE — PROGRESS NOTE ADULT - PROBLEM SELECTOR PLAN 1
strict I &o's  Analgesia, cont PCA, Toradol, Tylenol, valium, Flomax, Ditropan   Antiemetics PRN  DVT prophylaxis  Incentive spirometry  Resume diet   Care as per primary medical team

## 2025-03-19 NOTE — PROGRESS NOTE ADULT - PROBLEM SELECTOR PLAN 8
Lovenox as above
Lovenox as above
Uses THC daily, both as recreational and for pain  - F/up urine tox  - SBIRT consultation
Lovenox as above

## 2025-03-19 NOTE — PROGRESS NOTE ADULT - SUBJECTIVE AND OBJECTIVE BOX
Note    Post op Check    s/p : L ureteroscopy, RGP, biopsy, stent placement          L kidney wash, L kidney lower pole mass biopsy      Pt seen / examined having significant L flank pain     Vital Signs Last 24 Hrs  T(C): 36.5 (19 Mar 2025 19:00), Max: 37 (19 Mar 2025 11:05)  T(F): 97.7 (19 Mar 2025 19:00), Max: 98.6 (19 Mar 2025 11:05)  HR: 132 (19 Mar 2025 19:30) (74 - 138)  BP: 172/94 (19 Mar 2025 19:30) (132/91 - 180/100)  BP(mean): 115 (19 Mar 2025 19:30) (94 - 127)  RR: 17 (19 Mar 2025 19:30) (10 - 25)  SpO2: 92% (19 Mar 2025 19:30) (91% - 100%)    Parameters below as of 19 Mar 2025 19:30  Patient On (Oxygen Delivery Method): room air        I&O's Summary    18 Mar 2025 07:01  -  19 Mar 2025 07:00  --------------------------------------------------------  IN: 0 mL / OUT: 600 mL / NET: -600 mL        PHYSICAL EXAM:       Constitutional: awake alert NAD    Respiratory: no resp distress    Cardiovascular: RRR    Gastrointestinal: soft, + L CVAT    Genitourinary: normal ext genitalia    Extremities: + venodynes                                    8.0    8.01  )-----------( 249      ( 19 Mar 2025 09:07 )             22.5       03-19    137  |  104  |  27[H]  ----------------------------<  84  5.0   |  24  |  0.85    Ca    9.4      19 Mar 2025 09:07  Phos  4.1     03-18  Mg     1.60     03-19    TPro  7.0  /  Alb  3.8  /  TBili  6.7[H]  /  DBili  x   /  AST  127[H]  /  ALT  34  /  AlkPhos  152[H]  03-19

## 2025-03-19 NOTE — PROGRESS NOTE ADULT - PROBLEM SELECTOR PLAN 2
During last admission was found to have RUE DVT and was started on eliquis  - Duplex 12/31: Right Internal jugular Vein and left brachial vein.  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 no DVT LE  - on Full dose lovenox inpt. hold  dose this pm until procedure. f/u  postop when to resume   -s/p lasix 20 iv x 1 3/17 and 3/18. repeat dose as needed after  procedure

## 2025-03-19 NOTE — PRE-OP CHECKLIST - 1.
Received in asu preop for surgery  see flowsheetb for vital signs Received in asu preop for surgery  see flowsheet for vital signs

## 2025-03-19 NOTE — PROGRESS NOTE ADULT - ASSESSMENT
Pt is a 43 yo M w h/o SCD, UE DVT, gout, HTN, with L renal mass who is currently admitted for transfusion/treatment of sickle cell crisis prior to cystoscopy w L ureteroscopy and bx with Dr. Currie on Wednesday 3/19.     Plan   - urine culture from 3/12 negative, no need for further cultures   - Please document medical clearance/optimization, clearance appreciated   - s/p 1u pRBC on 3/18 for OR today  - patient  pre-op'd for OR today with NPO s/p midnight, AM labs on 3/19 pending (CBC/BMP/Coags/type and screen), clearances documented   - Consented for OR today, scheduled for noon    Discussed with Dr. Currie

## 2025-03-20 ENCOUNTER — APPOINTMENT (OUTPATIENT)
Dept: INFECTIOUS DISEASE | Facility: CLINIC | Age: 45
End: 2025-03-20

## 2025-03-20 LAB
ADD ON TEST-SPECIMEN IN LAB: SIGNIFICANT CHANGE UP
ADD ON TEST-SPECIMEN IN LAB: SIGNIFICANT CHANGE UP
ALBUMIN SERPL ELPH-MCNC: 2.6 G/DL — LOW (ref 3.3–5)
ALBUMIN SERPL ELPH-MCNC: 2.7 G/DL — LOW (ref 3.3–5)
ALBUMIN SERPL ELPH-MCNC: 2.7 G/DL — LOW (ref 3.3–5)
ALBUMIN SERPL ELPH-MCNC: 2.9 G/DL — LOW (ref 3.3–5)
ALP SERPL-CCNC: 103 U/L — SIGNIFICANT CHANGE UP (ref 40–120)
ALP SERPL-CCNC: 117 U/L — SIGNIFICANT CHANGE UP (ref 40–120)
ALP SERPL-CCNC: 119 U/L — SIGNIFICANT CHANGE UP (ref 40–120)
ALP SERPL-CCNC: 139 U/L — HIGH (ref 40–120)
ALT FLD-CCNC: 175 U/L — HIGH (ref 4–41)
ALT FLD-CCNC: 196 U/L — HIGH (ref 4–41)
ALT FLD-CCNC: 209 U/L — HIGH (ref 4–41)
ALT FLD-CCNC: 79 U/L — HIGH (ref 4–41)
ANION GAP SERPL CALC-SCNC: 28 MMOL/L — HIGH (ref 7–14)
ANION GAP SERPL CALC-SCNC: 31 MMOL/L — HIGH (ref 7–14)
ANION GAP SERPL CALC-SCNC: 34 MMOL/L — HIGH (ref 7–14)
ANION GAP SERPL CALC-SCNC: SIGNIFICANT CHANGE UP MMOL/L (ref 7–14)
ANISOCYTOSIS BLD QL: SIGNIFICANT CHANGE UP
APTT BLD: 34.8 SEC — SIGNIFICANT CHANGE UP (ref 24.5–35.6)
APTT BLD: 39.2 SEC — HIGH (ref 24.5–35.6)
APTT BLD: 39.6 SEC — HIGH (ref 24.5–35.6)
AST SERPL-CCNC: 232 U/L — HIGH (ref 4–40)
AST SERPL-CCNC: 473 U/L — HIGH (ref 4–40)
AST SERPL-CCNC: 542 U/L — HIGH (ref 4–40)
AST SERPL-CCNC: 580 U/L — HIGH (ref 4–40)
BASOPHILS # BLD AUTO: 0 K/UL — SIGNIFICANT CHANGE UP (ref 0–0.2)
BASOPHILS # BLD AUTO: 0.02 K/UL — SIGNIFICANT CHANGE UP (ref 0–0.2)
BASOPHILS # BLD AUTO: 0.05 K/UL — SIGNIFICANT CHANGE UP (ref 0–0.2)
BASOPHILS # BLD AUTO: 0.05 K/UL — SIGNIFICANT CHANGE UP (ref 0–0.2)
BASOPHILS # BLD AUTO: 0.06 K/UL — SIGNIFICANT CHANGE UP (ref 0–0.2)
BASOPHILS NFR BLD AUTO: 0 % — SIGNIFICANT CHANGE UP (ref 0–2)
BASOPHILS NFR BLD AUTO: 0.1 % — SIGNIFICANT CHANGE UP (ref 0–2)
BASOPHILS NFR BLD AUTO: 0.2 % — SIGNIFICANT CHANGE UP (ref 0–2)
BILIRUB SERPL-MCNC: 11.9 MG/DL — HIGH (ref 0.2–1.2)
BILIRUB SERPL-MCNC: 12 MG/DL — HIGH (ref 0.2–1.2)
BILIRUB SERPL-MCNC: 12.3 MG/DL — HIGH (ref 0.2–1.2)
BILIRUB SERPL-MCNC: 9.7 MG/DL — HIGH (ref 0.2–1.2)
BLD GP AB SCN SERPL QL: NEGATIVE — SIGNIFICANT CHANGE UP
BLOOD GAS ARTERIAL - LYTES,HGB,ICA,LACT RESULT: SIGNIFICANT CHANGE UP
BLOOD GAS ARTERIAL - LYTES,HGB,ICA,LACT RESULT: SIGNIFICANT CHANGE UP
BLOOD GAS ARTERIAL COMPREHENSIVE RESULT: SIGNIFICANT CHANGE UP
BLOOD GAS VENOUS COMPREHENSIVE RESULT: SIGNIFICANT CHANGE UP
BUN SERPL-MCNC: 47 MG/DL — HIGH (ref 7–23)
BUN SERPL-MCNC: 49 MG/DL — HIGH (ref 7–23)
BUN SERPL-MCNC: 51 MG/DL — HIGH (ref 7–23)
BUN SERPL-MCNC: 52 MG/DL — HIGH (ref 7–23)
CA-I BLD-SCNC: 1.1 MMOL/L — LOW (ref 1.15–1.29)
CA-I BLD-SCNC: 1.19 MMOL/L — SIGNIFICANT CHANGE UP (ref 1.15–1.29)
CALCIUM SERPL-MCNC: 11.3 MG/DL — HIGH (ref 8.4–10.5)
CALCIUM SERPL-MCNC: 9.2 MG/DL — SIGNIFICANT CHANGE UP (ref 8.4–10.5)
CALCIUM SERPL-MCNC: 9.2 MG/DL — SIGNIFICANT CHANGE UP (ref 8.4–10.5)
CALCIUM SERPL-MCNC: 9.5 MG/DL — SIGNIFICANT CHANGE UP (ref 8.4–10.5)
CHLORIDE SERPL-SCNC: 100 MMOL/L — SIGNIFICANT CHANGE UP (ref 98–107)
CHLORIDE SERPL-SCNC: 100 MMOL/L — SIGNIFICANT CHANGE UP (ref 98–107)
CHLORIDE SERPL-SCNC: 101 MMOL/L — SIGNIFICANT CHANGE UP (ref 98–107)
CHLORIDE SERPL-SCNC: 101 MMOL/L — SIGNIFICANT CHANGE UP (ref 98–107)
CK MB BLD-MCNC: 1.8 % — SIGNIFICANT CHANGE UP (ref 0–2.5)
CK MB CFR SERPL CALC: 1.1 NG/ML — SIGNIFICANT CHANGE UP
CK SERPL-CCNC: 2451 U/L — HIGH (ref 30–200)
CK SERPL-CCNC: 61 U/L — SIGNIFICANT CHANGE UP (ref 30–200)
CO2 SERPL-SCNC: 13 MMOL/L — LOW (ref 22–31)
CO2 SERPL-SCNC: 8 MMOL/L — CRITICAL LOW (ref 22–31)
CO2 SERPL-SCNC: 9 MMOL/L — CRITICAL LOW (ref 22–31)
CO2 SERPL-SCNC: <7 MMOL/L — CRITICAL LOW (ref 22–31)
CREAT SERPL-MCNC: 2.2 MG/DL — HIGH (ref 0.5–1.3)
CREAT SERPL-MCNC: 2.22 MG/DL — HIGH (ref 0.5–1.3)
CREAT SERPL-MCNC: 2.26 MG/DL — HIGH (ref 0.5–1.3)
CREAT SERPL-MCNC: 2.28 MG/DL — HIGH (ref 0.5–1.3)
EGFR: 35 ML/MIN/1.73M2 — LOW
EGFR: 35 ML/MIN/1.73M2 — LOW
EGFR: 36 ML/MIN/1.73M2 — LOW
EGFR: 37 ML/MIN/1.73M2 — LOW
EGFR: 37 ML/MIN/1.73M2 — LOW
EOSINOPHIL # BLD AUTO: 0 K/UL — SIGNIFICANT CHANGE UP (ref 0–0.5)
EOSINOPHIL # BLD AUTO: 0.01 K/UL — SIGNIFICANT CHANGE UP (ref 0–0.5)
EOSINOPHIL # BLD AUTO: 0.02 K/UL — SIGNIFICANT CHANGE UP (ref 0–0.5)
EOSINOPHIL NFR BLD AUTO: 0 % — SIGNIFICANT CHANGE UP (ref 0–6)
EOSINOPHIL NFR BLD AUTO: 0.1 % — SIGNIFICANT CHANGE UP (ref 0–6)
FIBRINOGEN PPP-MCNC: 131 MG/DL — LOW (ref 200–465)
GIANT PLATELETS BLD QL SMEAR: PRESENT — SIGNIFICANT CHANGE UP
GLUCOSE BLDC GLUCOMTR-MCNC: 101 MG/DL — HIGH (ref 70–99)
GLUCOSE BLDC GLUCOMTR-MCNC: 125 MG/DL — HIGH (ref 70–99)
GLUCOSE BLDC GLUCOMTR-MCNC: 130 MG/DL — HIGH (ref 70–99)
GLUCOSE BLDC GLUCOMTR-MCNC: 173 MG/DL — HIGH (ref 70–99)
GLUCOSE BLDC GLUCOMTR-MCNC: 209 MG/DL — HIGH (ref 70–99)
GLUCOSE BLDC GLUCOMTR-MCNC: 259 MG/DL — HIGH (ref 70–99)
GLUCOSE BLDC GLUCOMTR-MCNC: 48 MG/DL — CRITICAL LOW (ref 70–99)
GLUCOSE BLDC GLUCOMTR-MCNC: 49 MG/DL — CRITICAL LOW (ref 70–99)
GLUCOSE BLDC GLUCOMTR-MCNC: 51 MG/DL — CRITICAL LOW (ref 70–99)
GLUCOSE BLDC GLUCOMTR-MCNC: 77 MG/DL — SIGNIFICANT CHANGE UP (ref 70–99)
GLUCOSE BLDC GLUCOMTR-MCNC: 79 MG/DL — SIGNIFICANT CHANGE UP (ref 70–99)
GLUCOSE SERPL-MCNC: 112 MG/DL — HIGH (ref 70–99)
GLUCOSE SERPL-MCNC: 171 MG/DL — HIGH (ref 70–99)
GLUCOSE SERPL-MCNC: 225 MG/DL — HIGH (ref 70–99)
GLUCOSE SERPL-MCNC: 61 MG/DL — LOW (ref 70–99)
HCT VFR BLD CALC: 10 % — CRITICAL LOW (ref 39–50)
HCT VFR BLD CALC: 16.6 % — CRITICAL LOW (ref 39–50)
HCT VFR BLD CALC: 16.9 % — CRITICAL LOW (ref 39–50)
HCT VFR BLD CALC: 19.4 % — CRITICAL LOW (ref 39–50)
HCT VFR BLD CALC: 23.5 % — LOW (ref 39–50)
HGB BLD-MCNC: 3.4 G/DL — CRITICAL LOW (ref 13–17)
HGB BLD-MCNC: 5.5 G/DL — CRITICAL LOW (ref 13–17)
HGB BLD-MCNC: 5.8 G/DL — CRITICAL LOW (ref 13–17)
HGB BLD-MCNC: 6.7 G/DL — CRITICAL LOW (ref 13–17)
HGB BLD-MCNC: 8.4 G/DL — LOW (ref 13–17)
IANC: 10.04 K/UL — HIGH (ref 1.8–7.4)
IANC: 24.24 K/UL — HIGH (ref 1.8–7.4)
IANC: 28.93 K/UL — HIGH (ref 1.8–7.4)
IANC: 29.91 K/UL — HIGH (ref 1.8–7.4)
IANC: 32.43 K/UL — HIGH (ref 1.8–7.4)
IMM GRANULOCYTES NFR BLD AUTO: 1.1 % — HIGH (ref 0–0.9)
IMM GRANULOCYTES NFR BLD AUTO: 1.1 % — HIGH (ref 0–0.9)
IMM GRANULOCYTES NFR BLD AUTO: 1.2 % — HIGH (ref 0–0.9)
IMM GRANULOCYTES NFR BLD AUTO: 3.7 % — HIGH (ref 0–0.9)
INR BLD: 1.86 RATIO — HIGH (ref 0.85–1.16)
INR BLD: 2.06 RATIO — HIGH (ref 0.85–1.16)
INR BLD: 2.08 RATIO — HIGH (ref 0.85–1.16)
LG PLATELETS BLD QL AUTO: SLIGHT — SIGNIFICANT CHANGE UP
LYMPHOCYTES # BLD AUTO: 0.47 K/UL — LOW (ref 1–3.3)
LYMPHOCYTES # BLD AUTO: 0.65 K/UL — LOW (ref 1–3.3)
LYMPHOCYTES # BLD AUTO: 0.71 K/UL — LOW (ref 1–3.3)
LYMPHOCYTES # BLD AUTO: 0.86 K/UL — LOW (ref 1–3.3)
LYMPHOCYTES # BLD AUTO: 1.4 % — LOW (ref 13–44)
LYMPHOCYTES # BLD AUTO: 2 % — LOW (ref 13–44)
LYMPHOCYTES # BLD AUTO: 2.6 % — LOW (ref 13–44)
LYMPHOCYTES # BLD AUTO: 2.6 % — LOW (ref 13–44)
LYMPHOCYTES # BLD AUTO: 21.3 % — SIGNIFICANT CHANGE UP (ref 13–44)
LYMPHOCYTES # BLD AUTO: 3.27 K/UL — SIGNIFICANT CHANGE UP (ref 1–3.3)
MACROCYTES BLD QL: SIGNIFICANT CHANGE UP
MAGNESIUM SERPL-MCNC: 1.5 MG/DL — LOW (ref 1.6–2.6)
MAGNESIUM SERPL-MCNC: 1.7 MG/DL — SIGNIFICANT CHANGE UP (ref 1.6–2.6)
MAGNESIUM SERPL-MCNC: 2 MG/DL — SIGNIFICANT CHANGE UP (ref 1.6–2.6)
MAGNESIUM SERPL-MCNC: 2.1 MG/DL — SIGNIFICANT CHANGE UP (ref 1.6–2.6)
MANUAL SMEAR VERIFICATION: SIGNIFICANT CHANGE UP
MCHC RBC-ENTMCNC: 30.1 PG — SIGNIFICANT CHANGE UP (ref 27–34)
MCHC RBC-ENTMCNC: 30.4 PG — SIGNIFICANT CHANGE UP (ref 27–34)
MCHC RBC-ENTMCNC: 31.8 PG — SIGNIFICANT CHANGE UP (ref 27–34)
MCHC RBC-ENTMCNC: 31.8 PG — SIGNIFICANT CHANGE UP (ref 27–34)
MCHC RBC-ENTMCNC: 31.9 PG — SIGNIFICANT CHANGE UP (ref 27–34)
MCHC RBC-ENTMCNC: 33.1 G/DL — SIGNIFICANT CHANGE UP (ref 32–36)
MCHC RBC-ENTMCNC: 34 G/DL — SIGNIFICANT CHANGE UP (ref 32–36)
MCHC RBC-ENTMCNC: 34.3 G/DL — SIGNIFICANT CHANGE UP (ref 32–36)
MCHC RBC-ENTMCNC: 34.5 G/DL — SIGNIFICANT CHANGE UP (ref 32–36)
MCHC RBC-ENTMCNC: 35.7 G/DL — SIGNIFICANT CHANGE UP (ref 32–36)
MCV RBC AUTO: 85.1 FL — SIGNIFICANT CHANGE UP (ref 80–100)
MCV RBC AUTO: 88.5 FL — SIGNIFICANT CHANGE UP (ref 80–100)
MCV RBC AUTO: 91.9 FL — SIGNIFICANT CHANGE UP (ref 80–100)
MCV RBC AUTO: 92.9 FL — SIGNIFICANT CHANGE UP (ref 80–100)
MCV RBC AUTO: 96 FL — SIGNIFICANT CHANGE UP (ref 80–100)
MONOCYTES # BLD AUTO: 0.95 K/UL — HIGH (ref 0–0.9)
MONOCYTES # BLD AUTO: 1.33 K/UL — HIGH (ref 0–0.9)
MONOCYTES # BLD AUTO: 1.38 K/UL — HIGH (ref 0–0.9)
MONOCYTES # BLD AUTO: 1.42 K/UL — HIGH (ref 0–0.9)
MONOCYTES # BLD AUTO: 2.47 K/UL — HIGH (ref 0–0.9)
MONOCYTES NFR BLD AUTO: 3.5 % — SIGNIFICANT CHANGE UP (ref 2–14)
MONOCYTES NFR BLD AUTO: 4 % — SIGNIFICANT CHANGE UP (ref 2–14)
MONOCYTES NFR BLD AUTO: 4.1 % — SIGNIFICANT CHANGE UP (ref 2–14)
MONOCYTES NFR BLD AUTO: 7.6 % — SIGNIFICANT CHANGE UP (ref 2–14)
MONOCYTES NFR BLD AUTO: 9.3 % — SIGNIFICANT CHANGE UP (ref 2–14)
NEUTROPHILS # BLD AUTO: 10.04 K/UL — HIGH (ref 1.8–7.4)
NEUTROPHILS # BLD AUTO: 25.51 K/UL — HIGH (ref 1.8–7.4)
NEUTROPHILS # BLD AUTO: 28.93 K/UL — HIGH (ref 1.8–7.4)
NEUTROPHILS # BLD AUTO: 29.91 K/UL — HIGH (ref 1.8–7.4)
NEUTROPHILS # BLD AUTO: 32.43 K/UL — HIGH (ref 1.8–7.4)
NEUTROPHILS NFR BLD AUTO: 65.5 % — SIGNIFICANT CHANGE UP (ref 43–77)
NEUTROPHILS NFR BLD AUTO: 89.1 % — HIGH (ref 43–77)
NEUTROPHILS NFR BLD AUTO: 92 % — HIGH (ref 43–77)
NEUTROPHILS NFR BLD AUTO: 92.2 % — HIGH (ref 43–77)
NEUTROPHILS NFR BLD AUTO: 93.2 % — HIGH (ref 43–77)
NRBC # BLD AUTO: 0.04 K/UL — HIGH (ref 0–0)
NRBC # BLD AUTO: 0.05 K/UL — HIGH (ref 0–0)
NRBC # BLD AUTO: 0.06 K/UL — HIGH (ref 0–0)
NRBC # BLD AUTO: 0.27 K/UL — HIGH (ref 0–0)
NRBC # FLD: 0.04 K/UL — HIGH (ref 0–0)
NRBC # FLD: 0.05 K/UL — HIGH (ref 0–0)
NRBC # FLD: 0.06 K/UL — HIGH (ref 0–0)
NRBC # FLD: 0.27 K/UL — HIGH (ref 0–0)
NRBC BLD AUTO-RTO: 0 /100 WBCS — SIGNIFICANT CHANGE UP (ref 0–0)
NRBC BLD AUTO-RTO: 2 /100 WBCS — HIGH (ref 0–0)
NT-PROBNP SERPL-SCNC: 1492 PG/ML — HIGH
PHOSPHATE SERPL-MCNC: 10.2 MG/DL — HIGH (ref 2.5–4.5)
PHOSPHATE SERPL-MCNC: 7.4 MG/DL — HIGH (ref 2.5–4.5)
PHOSPHATE SERPL-MCNC: 8.7 MG/DL — HIGH (ref 2.5–4.5)
PHOSPHATE SERPL-MCNC: 8.9 MG/DL — HIGH (ref 2.5–4.5)
PLAT MORPH BLD: ABNORMAL
PLATELET # BLD AUTO: 132 K/UL — LOW (ref 150–400)
PLATELET # BLD AUTO: 137 K/UL — LOW (ref 150–400)
PLATELET # BLD AUTO: 184 K/UL — SIGNIFICANT CHANGE UP (ref 150–400)
PLATELET # BLD AUTO: 216 K/UL — SIGNIFICANT CHANGE UP (ref 150–400)
PLATELET # BLD AUTO: 234 K/UL — SIGNIFICANT CHANGE UP (ref 150–400)
PLATELET CLUMP BLD QL SMEAR: SLIGHT
PLATELET COUNT - ESTIMATE: ABNORMAL
POIKILOCYTOSIS BLD QL AUTO: SIGNIFICANT CHANGE UP
POLYCHROMASIA BLD QL SMEAR: SLIGHT — SIGNIFICANT CHANGE UP
POTASSIUM SERPL-MCNC: 4.8 MMOL/L — SIGNIFICANT CHANGE UP (ref 3.5–5.3)
POTASSIUM SERPL-MCNC: 5.5 MMOL/L — HIGH (ref 3.5–5.3)
POTASSIUM SERPL-MCNC: 6 MMOL/L — HIGH (ref 3.5–5.3)
POTASSIUM SERPL-MCNC: 6 MMOL/L — HIGH (ref 3.5–5.3)
POTASSIUM SERPL-SCNC: 4.8 MMOL/L — SIGNIFICANT CHANGE UP (ref 3.5–5.3)
POTASSIUM SERPL-SCNC: 5.5 MMOL/L — HIGH (ref 3.5–5.3)
POTASSIUM SERPL-SCNC: 6 MMOL/L — HIGH (ref 3.5–5.3)
POTASSIUM SERPL-SCNC: 6 MMOL/L — HIGH (ref 3.5–5.3)
PROT SERPL-MCNC: 4.7 G/DL — LOW (ref 6–8.3)
PROT SERPL-MCNC: 4.9 G/DL — LOW (ref 6–8.3)
PROT SERPL-MCNC: 4.9 G/DL — LOW (ref 6–8.3)
PROT SERPL-MCNC: 5.3 G/DL — LOW (ref 6–8.3)
PROTHROM AB SERPL-ACNC: 21.4 SEC — HIGH (ref 9.9–13.4)
PROTHROM AB SERPL-ACNC: 23.7 SEC — HIGH (ref 9.9–13.4)
PROTHROM AB SERPL-ACNC: 23.9 SEC — HIGH (ref 9.9–13.4)
RBC # BLD: 1.13 M/UL — LOW (ref 4.2–5.8)
RBC # BLD: 1.73 M/UL — LOW (ref 4.2–5.8)
RBC # BLD: 1.82 M/UL — LOW (ref 4.2–5.8)
RBC # BLD: 2.11 M/UL — LOW (ref 4.2–5.8)
RBC # BLD: 2.76 M/UL — LOW (ref 4.2–5.8)
RBC # FLD: 16.9 % — HIGH (ref 10.3–14.5)
RBC # FLD: 18.5 % — HIGH (ref 10.3–14.5)
RBC # FLD: 19.5 % — HIGH (ref 10.3–14.5)
RBC # FLD: 22.2 % — HIGH (ref 10.3–14.5)
RBC # FLD: 22.2 % — HIGH (ref 10.3–14.5)
RBC BLD AUTO: ABNORMAL
RH IG SCN BLD-IMP: POSITIVE — SIGNIFICANT CHANGE UP
SICKLE CELLS BLD QL SMEAR: SIGNIFICANT CHANGE UP
SODIUM SERPL-SCNC: 140 MMOL/L — SIGNIFICANT CHANGE UP (ref 135–145)
SODIUM SERPL-SCNC: 141 MMOL/L — SIGNIFICANT CHANGE UP (ref 135–145)
SODIUM SERPL-SCNC: 141 MMOL/L — SIGNIFICANT CHANGE UP (ref 135–145)
SODIUM SERPL-SCNC: 143 MMOL/L — SIGNIFICANT CHANGE UP (ref 135–145)
TROPONIN T, HIGH SENSITIVITY RESULT: 37 NG/L — SIGNIFICANT CHANGE UP
WBC # BLD: 15.34 K/UL — HIGH (ref 3.8–10.5)
WBC # BLD: 27.17 K/UL — HIGH (ref 3.8–10.5)
WBC # BLD: 32.48 K/UL — HIGH (ref 3.8–10.5)
WBC # BLD: 32.5 K/UL — HIGH (ref 3.8–10.5)
WBC # BLD: 34.74 K/UL — HIGH (ref 3.8–10.5)
WBC # FLD AUTO: 15.34 K/UL — HIGH (ref 3.8–10.5)
WBC # FLD AUTO: 27.17 K/UL — HIGH (ref 3.8–10.5)
WBC # FLD AUTO: 32.48 K/UL — HIGH (ref 3.8–10.5)
WBC # FLD AUTO: 32.5 K/UL — HIGH (ref 3.8–10.5)
WBC # FLD AUTO: 34.74 K/UL — HIGH (ref 3.8–10.5)

## 2025-03-20 PROCEDURE — 93308 TTE F-UP OR LMTD: CPT | Mod: 26,GC

## 2025-03-20 PROCEDURE — 37244 VASC EMBOLIZE/OCCLUDE BLEED: CPT

## 2025-03-20 PROCEDURE — 99292 CRITICAL CARE ADDL 30 MIN: CPT | Mod: GC,25

## 2025-03-20 PROCEDURE — 76770 US EXAM ABDO BACK WALL COMP: CPT | Mod: 26

## 2025-03-20 PROCEDURE — 76937 US GUIDE VASCULAR ACCESS: CPT | Mod: 26

## 2025-03-20 PROCEDURE — 36620 INSERTION CATHETER ARTERY: CPT | Mod: GC

## 2025-03-20 PROCEDURE — 74178 CT ABD&PLV WO CNTR FLWD CNTR: CPT | Mod: 26

## 2025-03-20 PROCEDURE — 76604 US EXAM CHEST: CPT | Mod: 26,GC

## 2025-03-20 PROCEDURE — 93010 ELECTROCARDIOGRAM REPORT: CPT

## 2025-03-20 PROCEDURE — 70450 CT HEAD/BRAIN W/O DYE: CPT | Mod: 26

## 2025-03-20 PROCEDURE — 36569 INSJ PICC 5 YR+ W/O IMAGING: CPT | Mod: GC

## 2025-03-20 PROCEDURE — 36253 INS CATH REN ART 2ND+ UNILAT: CPT | Mod: LT,59

## 2025-03-20 PROCEDURE — 99223 1ST HOSP IP/OBS HIGH 75: CPT

## 2025-03-20 PROCEDURE — 76705 ECHO EXAM OF ABDOMEN: CPT | Mod: 26,GC

## 2025-03-20 PROCEDURE — 71045 X-RAY EXAM CHEST 1 VIEW: CPT | Mod: 26

## 2025-03-20 PROCEDURE — 99291 CRITICAL CARE FIRST HOUR: CPT | Mod: GC,25

## 2025-03-20 RX ORDER — PHENYLEPHRINE HCL IN 0.9% NACL 0.5 MG/5ML
1 SYRINGE (ML) INTRAVENOUS
Qty: 40 | Refills: 0 | Status: DISCONTINUED | OUTPATIENT
Start: 2025-03-20 | End: 2025-03-20

## 2025-03-20 RX ORDER — SODIUM CHLORIDE 9 G/1000ML
1000 INJECTION, SOLUTION INTRAVENOUS
Refills: 0 | Status: DISCONTINUED | OUTPATIENT
Start: 2025-03-20 | End: 2025-03-21

## 2025-03-20 RX ORDER — SODIUM ZIRCONIUM CYCLOSILICATE 5 G/5G
10 POWDER, FOR SUSPENSION ORAL ONCE
Refills: 0 | Status: COMPLETED | OUTPATIENT
Start: 2025-03-20 | End: 2025-03-20

## 2025-03-20 RX ORDER — AZTREONAM 2 G/1
2000 INJECTION, POWDER, LYOPHILIZED, FOR SOLUTION INTRAMUSCULAR; INTRAVENOUS
Refills: 0 | Status: DISCONTINUED | OUTPATIENT
Start: 2025-03-20 | End: 2025-03-21

## 2025-03-20 RX ORDER — CALCIUM GLUCONATE 20 MG/ML
1 INJECTION, SOLUTION INTRAVENOUS ONCE
Refills: 0 | Status: COMPLETED | OUTPATIENT
Start: 2025-03-20 | End: 2025-03-20

## 2025-03-20 RX ORDER — SODIUM BICARBONATE 1 MEQ/ML
50 SYRINGE (ML) INTRAVENOUS ONCE
Refills: 0 | Status: COMPLETED | OUTPATIENT
Start: 2025-03-20 | End: 2025-03-20

## 2025-03-20 RX ORDER — PROPOFOL 10 MG/ML
20 INJECTION, EMULSION INTRAVENOUS
Qty: 1000 | Refills: 0 | Status: DISCONTINUED | OUTPATIENT
Start: 2025-03-20 | End: 2025-03-21

## 2025-03-20 RX ORDER — DEXTROSE 50 % IN WATER 50 %
50 SYRINGE (ML) INTRAVENOUS ONCE
Refills: 0 | Status: COMPLETED | OUTPATIENT
Start: 2025-03-20 | End: 2025-03-20

## 2025-03-20 RX ORDER — FENTANYL CITRATE-0.9 % NACL/PF 100MCG/2ML
0.5 SYRINGE (ML) INTRAVENOUS
Qty: 2500 | Refills: 0 | Status: DISCONTINUED | OUTPATIENT
Start: 2025-03-20 | End: 2025-03-26

## 2025-03-20 RX ORDER — MIDAZOLAM IN 0.9 % SOD.CHLORID 1 MG/ML
4 PLASTIC BAG, INJECTION (ML) INTRAVENOUS ONCE
Refills: 0 | Status: DISCONTINUED | OUTPATIENT
Start: 2025-03-20 | End: 2025-03-20

## 2025-03-20 RX ORDER — DEXTROSE 50 % IN WATER 50 %
12.5 SYRINGE (ML) INTRAVENOUS ONCE
Refills: 0 | Status: COMPLETED | OUTPATIENT
Start: 2025-03-20 | End: 2025-03-20

## 2025-03-20 RX ORDER — MAGNESIUM SULFATE 500 MG/ML
2 SYRINGE (ML) INJECTION ONCE
Refills: 0 | Status: COMPLETED | OUTPATIENT
Start: 2025-03-20 | End: 2025-03-20

## 2025-03-20 RX ORDER — SODIUM CHLORIDE 9 G/1000ML
1000 INJECTION, SOLUTION INTRAVENOUS ONCE
Refills: 0 | Status: COMPLETED | OUTPATIENT
Start: 2025-03-20 | End: 2025-03-20

## 2025-03-20 RX ORDER — FENTANYL CITRATE-0.9 % NACL/PF 100MCG/2ML
100 SYRINGE (ML) INTRAVENOUS ONCE
Refills: 0 | Status: DISCONTINUED | OUTPATIENT
Start: 2025-03-20 | End: 2025-03-20

## 2025-03-20 RX ORDER — VANCOMYCIN HCL IN 5 % DEXTROSE 1.5G/250ML
1000 PLASTIC BAG, INJECTION (ML) INTRAVENOUS ONCE
Refills: 0 | Status: COMPLETED | OUTPATIENT
Start: 2025-03-20 | End: 2025-03-20

## 2025-03-20 RX ORDER — MIDAZOLAM IN 0.9 % SOD.CHLORID 1 MG/ML
2 PLASTIC BAG, INJECTION (ML) INTRAVENOUS ONCE
Refills: 0 | Status: DISCONTINUED | OUTPATIENT
Start: 2025-03-20 | End: 2025-03-20

## 2025-03-20 RX ORDER — CISATRACURIUM BESYLATE 10 MG/5ML
20 INJECTION, SOLUTION INTRAVENOUS ONCE
Refills: 0 | Status: COMPLETED | OUTPATIENT
Start: 2025-03-20 | End: 2025-03-20

## 2025-03-20 RX ORDER — MAGNESIUM SULFATE 500 MG/ML
2 SYRINGE (ML) INJECTION DAILY
Refills: 0 | Status: COMPLETED | OUTPATIENT
Start: 2025-03-20 | End: 2025-03-20

## 2025-03-20 RX ORDER — DEXTROSE 50 % IN WATER 50 %
12.5 SYRINGE (ML) INTRAVENOUS ONCE
Refills: 0 | Status: COMPLETED | OUTPATIENT
Start: 2025-03-20 | End: 2026-02-16

## 2025-03-20 RX ORDER — NOREPINEPHRINE BITARTRATE 8 MG
1 SOLUTION INTRAVENOUS
Qty: 8 | Refills: 0 | Status: DISCONTINUED | OUTPATIENT
Start: 2025-03-20 | End: 2025-03-27

## 2025-03-20 RX ORDER — SODIUM CHLORIDE 9 G/1000ML
1000 INJECTION, SOLUTION INTRAVENOUS
Refills: 0 | Status: DISCONTINUED | OUTPATIENT
Start: 2025-03-20 | End: 2025-03-20

## 2025-03-20 RX ORDER — METRONIDAZOLE 250 MG
500 TABLET ORAL EVERY 8 HOURS
Refills: 0 | Status: DISCONTINUED | OUTPATIENT
Start: 2025-03-20 | End: 2025-03-21

## 2025-03-20 RX ADMIN — Medication 5 UNIT(S): at 14:51

## 2025-03-20 RX ADMIN — Medication 4 MILLIGRAM(S): at 02:22

## 2025-03-20 RX ADMIN — OXYBUTYNIN CHLORIDE 5 MILLIGRAM(S): 5 TABLET, FILM COATED, EXTENDED RELEASE ORAL at 01:06

## 2025-03-20 RX ADMIN — PROPOFOL 10.7 MICROGRAM(S)/KG/MIN: 10 INJECTION, EMULSION INTRAVENOUS at 22:02

## 2025-03-20 RX ADMIN — OXYBUTYNIN CHLORIDE 5 MILLIGRAM(S): 5 TABLET, FILM COATED, EXTENDED RELEASE ORAL at 18:06

## 2025-03-20 RX ADMIN — Medication 30 MILLILITER(S): at 05:13

## 2025-03-20 RX ADMIN — Medication 1 APPLICATION(S): at 14:53

## 2025-03-20 RX ADMIN — Medication 4 MILLIGRAM(S): at 13:14

## 2025-03-20 RX ADMIN — Medication 25 GRAM(S): at 18:04

## 2025-03-20 RX ADMIN — Medication 12.5 GRAM(S): at 09:15

## 2025-03-20 RX ADMIN — PROPOFOL 10.7 MICROGRAM(S)/KG/MIN: 10 INJECTION, EMULSION INTRAVENOUS at 18:54

## 2025-03-20 RX ADMIN — Medication 25 GRAM(S): at 15:59

## 2025-03-20 RX ADMIN — Medication 4.44 MICROGRAM(S)/KG/HR: at 16:19

## 2025-03-20 RX ADMIN — SODIUM CHLORIDE 100 MILLILITER(S): 9 INJECTION, SOLUTION INTRAVENOUS at 22:03

## 2025-03-20 RX ADMIN — Medication 100 MILLIGRAM(S): at 23:00

## 2025-03-20 RX ADMIN — Medication 4 MILLIGRAM(S): at 14:18

## 2025-03-20 RX ADMIN — SODIUM CHLORIDE 1000 MILLILITER(S): 9 INJECTION, SOLUTION INTRAVENOUS at 11:06

## 2025-03-20 RX ADMIN — Medication 250 MILLIGRAM(S): at 14:37

## 2025-03-20 RX ADMIN — Medication 100 MICROGRAM(S): at 11:30

## 2025-03-20 RX ADMIN — Medication 100 MILLIGRAM(S): at 14:39

## 2025-03-20 RX ADMIN — Medication 50 MILLILITER(S): at 14:51

## 2025-03-20 RX ADMIN — Medication 30 MILLIGRAM(S): at 05:44

## 2025-03-20 RX ADMIN — Medication 2 MILLIGRAM(S): at 23:15

## 2025-03-20 RX ADMIN — Medication 50 MILLIEQUIVALENT(S): at 13:15

## 2025-03-20 RX ADMIN — SODIUM ZIRCONIUM CYCLOSILICATE 10 GRAM(S): 5 POWDER, FOR SUSPENSION ORAL at 16:14

## 2025-03-20 RX ADMIN — Medication 100 MICROGRAM(S): at 11:11

## 2025-03-20 RX ADMIN — NOREPINEPHRINE BITARTRATE 167 MICROGRAM(S)/KG/MIN: 8 SOLUTION at 22:02

## 2025-03-20 RX ADMIN — SODIUM CHLORIDE 100 MILLILITER(S): 9 INJECTION, SOLUTION INTRAVENOUS at 12:36

## 2025-03-20 RX ADMIN — CALCIUM GLUCONATE 100 GRAM(S): 20 INJECTION, SOLUTION INTRAVENOUS at 12:19

## 2025-03-20 RX ADMIN — Medication 2 MILLIGRAM(S): at 10:54

## 2025-03-20 RX ADMIN — AZTREONAM 100 MILLIGRAM(S): 2 INJECTION, POWDER, LYOPHILIZED, FOR SOLUTION INTRAMUSCULAR; INTRAVENOUS at 15:54

## 2025-03-20 RX ADMIN — CISATRACURIUM BESYLATE 20 MILLIGRAM(S): 10 INJECTION, SOLUTION INTRAVENOUS at 16:18

## 2025-03-20 RX ADMIN — Medication 4.44 MICROGRAM(S)/KG/HR: at 22:02

## 2025-03-20 RX ADMIN — NOREPINEPHRINE BITARTRATE 167 MICROGRAM(S)/KG/MIN: 8 SOLUTION at 11:09

## 2025-03-20 RX ADMIN — POLYETHYLENE GLYCOL 3350 17 GRAM(S): 17 POWDER, FOR SOLUTION ORAL at 15:11

## 2025-03-20 RX ADMIN — Medication 50 MILLIEQUIVALENT(S): at 14:51

## 2025-03-20 RX ADMIN — Medication 15 MILLILITER(S): at 18:05

## 2025-03-20 RX ADMIN — Medication 30 MILLILITER(S): at 07:40

## 2025-03-20 NOTE — DISCHARGE NOTE PROVIDER - NSDCFUSCHEDAPPT_GEN_ALL_CORE_FT
[facility]  HEPATOLOGY [phone] [facility]  Scheduled Appointment: 04/23/2025

## 2025-03-20 NOTE — PROGRESS NOTE ADULT - NS ATTEND AMEND GEN_ALL_CORE FT
pt with RRT and code this morning. hg was found to be 3.4 after procedure and hypovolemic shock   getting rapid transfusion protocol  rule bleed post procedure   likely not related to his sickle cell disease with acute drop in Hg  will cont to follow   d/w [facility] staff

## 2025-03-20 NOTE — DISCHARGE NOTE PROVIDER - NSDCCPCAREPLAN_GEN_ALL_CORE_FT
PRINCIPAL DISCHARGE DIAGNOSIS  Diagnosis: Anemia, sickle cell with crisis  Assessment and Plan of Treatment: Patient presented with anemia and sickle cell crisis requiring transfusion and pain control with IV narcotics. Course was complicated by hemorrhagic shock and cardiac arrest post renal biopsiy (found on prior admission) resulting in anoxic brain injury and tracheostomy and ventilator dependence along with dependence on G tube for nutrition. Renal biopsy was positive for urothelial cancer however patient is not a candidate for treatment given anoxic brain injury and ventilator dependence after discussion with Oncology. Deferasirox (Jadenu) stopped due to renal failure while admitted, now normalized.   Tracheostomy: 7 PORTEX   Ventilator settings: RR 20 / VT 460ml / PEEP 6 / FIO2 30%  Continue tracheostomy care and cleaning twice daily at facility of per facility protocol.      SECONDARY DISCHARGE DIAGNOSES  Diagnosis: Tongue biting  Assessment and Plan of Treatment: Patient noted with tongue biting for which a bite block was placed. Inpatient Dentistry created a mouthguard which will be ready to  on 5/16. Please contact [facility] Dental service to schedule . Continue bite block at facility until mouth guard obtained.    Diagnosis: Transaminitis  Assessment and Plan of Treatment: Liver enzymes were elevated during stay likely multifactorial including hemorrhagic shock/cardiac arrest but also found with cirrhosis on imaging. Worsened with bromocriptine whch has been stopped. LFTs stable off bromocriptine. Monitor with labs at NH. Monitor LFTs at facility with lab work.    Diagnosis: Anoxic brain injury  Assessment and Plan of Treatment: Patient suffered anoxic brain injury post cardiac arrest. EEG showed myoclonic seizures in the setting of a severe diffuse/multifocal cerebral dysfunction. Continue antiepileptic medications as directed.       Diagnosis: MATA (acute kidney injury)  Assessment and Plan of Treatment: Acute kidney injury noted while admitted likely due to shock and cardiac arrest. Patient required dialysis briefly. Now resolved and renal function recovered.    Diagnosis: Hypernatremia  Assessment and Plan of Treatment: Sodium elevated during stay improved with D5 and FWF. Continue  ml Q6H via feeding tube.    Diagnosis: Hypertension  Assessment and Plan of Treatment: Cpontinue Norvasc. Monitor BP twice daily.    Diagnosis: DVT (deep venous thrombosis)  Assessment and Plan of Treatment: Patient with multiple DVTs however given large bleed leading to cardiac arrest he remains high risk for further anticoagulation so this was deferred. We discussed the case with IR and decision was made to also defer IVC filter. Repeat ultrasound of all extremities during stay in NH to ensure no propagation of clots.    Diagnosis: Encounter for wound care  Assessment and Plan of Treatment: Topical wound care recommendations:   --- Apply Sween 24 Moisturizer to b/l UE and LE daily, avoiding in between the toes.   --- Right plantar foot: Cleanse with NS, pat dry. Paint with Betadine and allow to dry. Apply daily and PRN if soiled.   --- Tracheostomy: Cleanse around trach site with NS. Pat dry. Apply Silicone foam dressing without border beneath trach collar, cut mid dressing to "Y" shape. Change foam dressing every shift and prn. Change trach ties every 3 days.   --- PEG: Cleanse q shift with NS, apply liquid barrier film beneath beatris disc.  If redness noted under beatris disc bumper apply thin foam dressing without border (Mepilex Lite)- cut to mid dressing with a 'Y' shape. Secondary securement to abdomen with paper tape or securement device.   ---B/L ears: Cleanse with NS, pat dry. Apply Liquid barrier film to periwound skin (allow to dry). Cover with small silicone foam with border. Change every other day and PRN if soiled.   ---B/l buttocks: Cleanse with skin cleanser, pat dry. Apply Jacques moisture barrier cream twice a day and PRN with incontinent episodes.     PRINCIPAL DISCHARGE DIAGNOSIS  Diagnosis: Anemia, sickle cell with crisis  Assessment and Plan of Treatment: - Patient presented with anemia and sickle cell crisis requiring transfusion and pain control with IV narcotics. Course was complicated by hemorrhagic shock and cardiac arrest post renal biopsiy (found on prior admission) resulting in anoxic brain injury and tracheostomy and ventilator dependence along with dependence on G tube for nutrition. Deferasirox (Jadenu) stopped per hematology  - Check sickle cells labs with CBC with DIFF, retic count, CMP, Mag, Phos, LDH and Haptoglobin.      SECONDARY DISCHARGE DIAGNOSES  Diagnosis: Acute respiratory failure with hypoxia  Assessment and Plan of Treatment: Given respiratory failure patient continued with tracheostomy and vent dependence as below:   Tracheostomy: 7 PORTEX   Ventilator settings: RR 20 / VT 460ml / PEEP 6 / FIO2 30%  Continue tracheostomy care and cleaning twice daily at facility of per facility protocol.    Diagnosis: Hypertension  Assessment and Plan of [facility] BP twice [facility].    Diagnosis: Transaminitis  Assessment and Plan of Treatment: - Liver enzymes were elevated during stay likely multifactorial including hemorrhagic shock/cardiac arrest, but also found with cirrhosis on imaging with known long term sickle cell disease. Worsened with bromocriptine whch has been stopped. LFTs stable off bromocriptine. Monitor with labs at NH. Monitor LFTs at facility with lab work.    Diagnosis: MATA (acute kidney injury)  Assessment and Plan of Treatment: - Acute kidney injury noted while admitted likely due to shock and cardiac arrest. Patient required dialysis briefly. MATA now resolved and renal function recovered.  - Biopsy of renal mass was positive for urothelial cancer however patient is not a candidate for treatment given anoxic brain injury and ventilator dependence after discussion with Oncology.    Diagnosis: Hypernatremia  Assessment and Plan of Treatment: - Sodium elevated during stay improved with FWF.   - Continue  ml Q6H via feeding tube.    Diagnosis: Tongue biting  Assessment and Plan of Treatment: - Patient noted with tongue biting for which a bite block was placed. Inpatient Dentistry created a mouthguard and sent with patient.    Diagnosis: Anoxic brain injury  Assessment and Plan of Treatment: - Patient suffered anoxic brain injury post cardiac arrest. EEG showed myoclonic seizures in the setting of a severe diffuse/multifocal cerebral dysfunction. Continue antiepileptic medications as directed.   - Rest of care per nursing home    Diagnosis: DVT (deep venous thrombosis)  Assessment and Plan of Treatment: - Patient with multiple DVTs however given large bleed leading to cardiac arrest he remains high risk for further anticoagulation so this was deferred. We discussed the case with IR and decision was made to also defer IVC filter. Repeat ultrasound of all extremities during stay in NH to ensure no propagation of clots.    Diagnosis: Encounter for wound care  Assessment and Plan of Treatment: Topical wound care recommendations:   --- Apply Sween 24 Moisturizer to b/l UE and LE daily, avoiding in between the toes.   --- Right plantar foot: Cleanse with NS, pat dry. Paint with Betadine and allow to dry. Apply daily and PRN if soiled.   --- Tracheostomy: Cleanse around trach site with NS. Pat dry. Apply Silicone foam dressing without border beneath trach collar, cut mid dressing to "Y" shape. Change foam dressing every shift and prn. Change trach ties every 3 days.   --- PEG: Cleanse q shift with NS, apply liquid barrier film beneath beatris disc.  If redness noted under beatris disc bumper apply thin foam dressing without border (Mepilex Lite)- cut to mid dressing with a 'Y' shape. Secondary securement to abdomen with paper tape or securement device.   ---B/L ears: Cleanse with NS, pat dry. Apply Liquid barrier film to periwound skin (allow to dry). Cover with small silicone foam with border. Change every other day and PRN if soiled.   ---B/l buttocks: Cleanse with skin cleanser, pat dry. Apply Jacques moisture barrier cream twice a day and PRN with incontinent episodes.     PRINCIPAL DISCHARGE DIAGNOSIS  Diagnosis: Anemia, sickle cell with crisis  Assessment and Plan of Treatment: - Patient presented with anemia and sickle cell crisis requiring transfusion and pain control with IV narcotics. Course was complicated by hemorrhagic shock and cardiac arrest post renal biopsiy (found on prior admission) resulting in anoxic brain injury and tracheostomy and ventilator dependence along with dependence on G tube for nutrition. Deferasirox (Jadenu) stopped per hematology  - Check sickle cells labs with CBC with DIFF, retic count, CMP, Mag, Phos, LDH and Haptoglobin.  - Rest of care per nursing home medical team.      SECONDARY DISCHARGE DIAGNOSES  Diagnosis: Acute respiratory failure with hypoxia  Assessment and Plan of Treatment: - Given respiratory failure patient continued with tracheostomy and vent dependence as below:   - Tracheostomy: 7 PORTEX   - Ventilator settings: RR 20 / VT 460ml / PEEP 6 / FIO2 30%  - Continue tracheostomy care and cleaning twice daily at facility of per facility protocol. Rest of care per nursing home medical team.    Diagnosis: Hypertension  Assessment and Plan of Treatment: - Coontinue Norvasc.   - Monitor BP twice daily.  - Rest of care per nursing home medical team.    Diagnosis: Transaminitis  Assessment and Plan of Treatment: - Liver enzymes were elevated during stay likely multifactorial including hemorrhagic shock/cardiac arrest, but also found with cirrhosis on imaging with known long term sickle cell disease. Worsened with bromocriptine whch has been stopped. LFTs stable off bromocriptine. Monitor with labs at NH. Monitor LFTs at facility with lab work.  - Rest of care per nursing home medical team.    Diagnosis: MATA (acute kidney injury)  Assessment and Plan of Treatment: - Acute kidney injury noted while admitted likely due to shock and cardiac arrest. Patient required dialysis briefly. MATA now resolved and renal function recovered.  - Biopsy of renal mass was positive for urothelial cancer however patient is not a candidate for treatment given anoxic brain injury and ventilator dependence after discussion with Oncology.  - Rest of care per nursing home medical team.    Diagnosis: Hypernatremia  Assessment and Plan of Treatment: - Sodium elevated during stay improved with FWF.   - Continue  ml Q6H via feeding tube.  - Rest of care per nursing home medical team.    Diagnosis: Tongue biting  Assessment and Plan of Treatment: - Patient noted with tongue biting for which a bite block/ mouth guard was created and placed in house. Mouth guard should not be left in for > 24 hours without oral care or mouth hygiene performed. Please ensure mouth care/ oral hygiene is performed twice a day and mouth guard is take out, cleaned, and placed back in twice a day.   - Rest of care per nursing home medical team.    Diagnosis: Anoxic brain injury  Assessment and Plan of Treatment: - Patient suffered anoxic brain injury post cardiac arrest. EEG showed myoclonic seizures in the setting of a severe diffuse/multifocal cerebral dysfunction. Continue antiepileptic medications as directed.   - Rest of care per nursing home    Diagnosis: DVT (deep venous thrombosis)  Assessment and Plan of Treatment: - Patient with multiple DVTs however given large bleed leading to cardiac arrest he remains high risk for further anticoagulation so this was deferred. We discussed the case with IR and decision was made to also defer IVC filter.  - Repeat ultrasound of all extremities during stay in NH to ensure no propagation of clots.  - Rest of care per nursing home medical team.    Diagnosis: Encounter for wound care  Assessment and Plan of Treatment: Topical wound care recommendations:   - Tracheostomy: Cleanse around trach site with NS. Pat dry. Apply Silicone foam dressing without border beneath trach collar, cut mid dressing to "Y" shape. Change foam dressing every shift and prn. Change trach ties every 3 days.   - PEG: Cleanse q shift with NS, apply liquid barrier film beneath beatris disc.  If redness noted under beatris disc bumper apply thin foam dressing without border (Mepilex Lite)- cut to mid dressing with a 'Y' shape. Secondary securement to abdomen with paper tape or securement device.   - B/L ears: Cleanse with NS, pat dry. Apply Liquid barrier film to periwound skin (allow to dry). Cover with small silicone foam with border. Change every other day and PRN if soiled.   - B/l buttocks: Cleanse with skin cleanser, pat dry. Apply Jacques moisture barrier cream twice a day and PRN with incontinent episodes.  - Apply Sween 24 Moisturizer to b/l UE and LE daily, avoiding in between the toes.   - Right plantar foot: Cleanse with NS, pat dry. Paint with Betadine and allow to dry. Apply daily and PRN if soiled.   - Rest of care per nursing home medical team.     PRINCIPAL DISCHARGE DIAGNOSIS  Diagnosis: Anemia, sickle cell with crisis  Assessment and Plan of Treatment: - Patient presented with anemia and sickle cell crisis requiring transfusion and pain control with IV narcotics. Course was complicated by hemorrhagic shock and cardiac arrest post renal biopsiy (found on prior admission) resulting in anoxic brain injury and tracheostomy and ventilator dependence along with dependence on G tube for nutrition. Deferasirox (Jadenu) stopped per hematology  - Check sickle cells labs with CBC with DIFF, retic count, CMP, Mag, Phos, LDH and Haptoglobin.  - Rest of care per nursing home medical team.      SECONDARY DISCHARGE DIAGNOSES  Diagnosis: Acute respiratory failure with hypoxia  Assessment and Plan of Treatment: - Given respiratory failure patient continued with tracheostomy and vent dependence as below:   - Tracheostomy: 7 PORTEX   - Ventilator settings: RR 20 / VT 460ml / PEEP 6 / FIO2 30%  - Continue tracheostomy care and cleaning twice daily at facility of per facility protocol. Rest of care per nursing home medical team.    Diagnosis: Hypertension  Assessment and Plan of Treatment: - Coontinue Norvasc.   - Monitor BP twice daily.  - Rest of care per nursing home medical team.    Diagnosis: Transaminitis  Assessment and Plan of Treatment: - Liver enzymes were elevated during stay likely multifactorial including hemorrhagic shock/cardiac arrest, but also found with cirrhosis on imaging with known long term sickle cell disease. Worsened with bromocriptine whch has been stopped. LFTs stable off bromocriptine. Monitor with labs at NH. Monitor LFTs at facility with lab work.  - Rest of care per nursing home medical team.    Diagnosis: MATA (acute kidney injury)  Assessment and Plan of Treatment: - Acute kidney injury noted while admitted likely due to shock and cardiac arrest. Patient required dialysis briefly. MATA now resolved and renal function recovered.  - Biopsy of renal mass was positive for urothelial cancer however patient is not a candidate for treatment given anoxic brain injury and ventilator dependence after discussion with Oncology.  - Rest of care per nursing home medical team.    Diagnosis: Hypernatremia  Assessment and Plan of Treatment: - Sodium elevated during stay improved with FWF.   - Continue  ml Q6H via feeding tube.  - Rest of care per nursing home medical team.    Diagnosis: Tongue biting  Assessment and Plan of Treatment: - Patient noted with tongue biting for which a bite block/ mouth guard was created and placed in house. Mouth guard should not be left in for > 24 hours without oral care or mouth hygiene performed. Please ensure mouth care/ oral hygiene with biotene and peridex is performed twice a day and mouth guard is take out, cleaned, and placed back in twice a day.   - Rest of care per nursing home medical team.    Diagnosis: Anoxic brain injury  Assessment and Plan of Treatment: - Patient suffered anoxic brain injury post cardiac arrest. EEG showed myoclonic seizures in the setting of a severe diffuse/multifocal cerebral dysfunction. Continue antiepileptic medications as directed.   - Rest of care per nursing home    Diagnosis: DVT (deep venous thrombosis)  Assessment and Plan of Treatment: - Patient with multiple DVTs however given large bleed leading to cardiac arrest he remains high risk for further anticoagulation so this was deferred. We discussed the case with IR and decision was made to also defer IVC filter.  - Repeat ultrasound of all extremities during stay in NH to ensure no propagation of clots.  - Rest of care per nursing home medical team.    Diagnosis: Encounter for wound care  Assessment and Plan of Treatment: Topical wound care recommendations:   - Tracheostomy: Cleanse around trach site with NS. Pat dry. Apply Silicone foam dressing without border beneath trach collar, cut mid dressing to "Y" shape. Change foam dressing every shift and prn. Change trach ties every 3 days.   - PEG: Cleanse q shift with NS, apply liquid barrier film beneath beatris disc.  If redness noted under beatris disc bumper apply thin foam dressing without border (Mepilex Lite)- cut to mid dressing with a 'Y' shape. Secondary securement to abdomen with paper tape or securement device.   - B/L ears: Cleanse with NS, pat dry. Apply Liquid barrier film to periwound skin (allow to dry). Cover with small silicone foam with border. Change every other day and PRN if soiled.   - B/l buttocks: Cleanse with skin cleanser, pat dry. Apply Jacques moisture barrier cream twice a day and PRN with incontinent episodes.  - Apply Sween 24 Moisturizer to b/l UE and LE daily, avoiding in between the toes.   - Right plantar foot: Cleanse with NS, pat dry. Paint with Betadine and allow to dry. Apply daily and PRN if soiled.   - Rest of care per nursing home medical team.

## 2025-03-20 NOTE — RAPID RESPONSE TEAM SUMMARY - NSADDTLFINDINGSRRT_GEN_ALL_CORE
#Cardiac Arrest s/p PEA  #Likely driven by hypoglycemia  -prior to code AM glucose noted to be 50 -> s/p D50 noted to be 41  -during code, 1 x epi, 2 x bicarb, 1 x calcium, initiated on D5-NS fluids  -code x 4 minutes (PEA)  -upon intubation, initiated on levo, phenylephrine, + sedation  -transferred to MICU

## 2025-03-20 NOTE — CHART NOTE - NSCHARTNOTEFT_GEN_A_CORE
MICU Accept Note    CHIEF COMPLAINT:     HPI:  44M with PMHx of Sickle cell disease (last transfusion/exchange transfusion 2024), prior acute chest syndrome, iron overload, Gout, HTN and RUE DVT 2024 on eliquis (on hold since 3/15 for upcoming cystoscopy, ureteroscopy on 3/19) who was sent in by his Hematologist for transfusion/low Hg prior to procedure (Hg of 5.3). Patient c/o back/LE pain at time of visit secondary to being on sickle cell crisis. Denies SOB or chest pain. No recent fevers, chills or sick contacts. No cough. No nausea/vomiting or abdominal pain. No melena or BRBPR. No urinary complaints, denies hematuria. Good appetite.    In ER /85, , RR 18, Temp 98.7F, O2 Sat 88% RA-placed on 4L NC  Received Tylenol IV, benadryl 50mg IV, dilaudid 2mg IV x3, 1U PRBC (15 Mar 2025 09:54)      INTERVAL HISTORY:   Patient admitted for anemia found on outpatient labs concerning for sickle cell crisis. Patient received 1u PRBC and started on PCA pump with heme following. Urology consulted for prior planned cystoscopy w/ L ureteroscopy and bx now s/p L ureteroscopy w/ biopsy and stent placement on 3/19. 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 On 3/20, patient had RRT for hypoglycemia that resolved with d50, however had another RRT for repeated hypoglycemia, however patient found to be pulseless, code blue called for PEA. ROSC achieved in approximately 4 minutes and patient intubated and transferred to MICU.     PAST MEDICAL & SURGICAL HISTORY:  Sickle cell anemia      Gout      Deep vein thrombosis (DVT)      Iron overload      Hypertension      History of cholecystectomy      History of removal of Port-a-Cath          FAMILY HISTORY:  Family history of sickle cell trait (Father, Mother)    Patient's father is  (Father)    Patient's mother is  (Mother)        Social History:  Lives alone (15 Mar 2025 09:54)      HOME MEDICATIONS:  Home Medications:  allopurinol 100 mg oral tablet: 1 tab(s) orally once a day (15 Mar 2025 13:59)  Eliquis 5 mg oral tablet: 1 tab(s) orally 2 times a day (15 Mar 2025 13:59)  folic acid 1 mg oral tablet: 1 tab(s) orally every 24 hours (15 Mar 2025 13:59)  NIFEdipine 30 mg oral tablet, extended release: 1 tab(s) orally once a day (15 Mar 2025 13:59)  oxyCODONE 30 mg oral tablet: 1 tab(s) orally every 4 hours as needed for  severe pain (15 Mar 2025 13:59)      Allergies    penicillin (Pruritus)  hydroxyurea (Other)  piperacillin-tazobactam (Urticaria)  ceftriaxone (Anaphylaxis)    Intolerances        REVIEW OF SYSTEMS:  Constitutional: No fevers, chills, weight loss, weight gain  HEENT: No vision problems, eye pain, nasal congestion, rhinorrhea, sore throat, dysphagia  CV: No chest pain, orthopnea, palpitations  Resp: No cough, dyspnea, wheezing, hemoptysis  GI: No nausea, vomiting, diarrhea, constipation, abdominal pain  : [ ] dysuria [ ] nocturia [ ] hematuria [ ] increased urinary frequency  Musculoskeletal: [ ] back pain [ ] myalgias [ ] arthralgias [ ] fracture  Skin: [ ] rash [ ] itch  Neurological: [ ] headache [ ] dizziness [ ] syncope [ ] weakness [ ] numbness  Psychiatric: [ ] anxiety [ ] depression  Endocrine: [ ] diabetes [ ] thyroid problem  Hematologic/Lymphatic: [ ] anemia [ ] bleeding problem  Allergic/Immunologic: [ ] itchy eyes [ ] nasal discharge [ ] hives [ ] angioedema  [ ] All other systems negative  [ ] Unable to assess ROS because ________    OBJECTIVE:  ICU Vital Signs Last 24 Hrs  T(C): 36.6 (20 Mar 2025 05:40), Max: 37 (19 Mar 2025 11:05)  T(F): 97.9 (20 Mar 2025 05:40), Max: 98.6 (19 Mar 2025 11:05)  HR: 98 (20 Mar 2025 05:40) (74 - 138)  BP: 121/61 (20 Mar 2025 05:40) (117/63 - 180/100)  BP(mean): 106 (19 Mar 2025 20:45) (94 - 127)  ABP: --  ABP(mean): --  RR: 18 (20 Mar 2025 05:40) (10 - 25)  SpO2: 97% (20 Mar 2025 05:40) (91% - 100%)    O2 Parameters below as of 20 Mar 2025 05:40  Patient On (Oxygen Delivery Method): room air              03- @ 07:01  -   @ 07:00  --------------------------------------------------------  IN: 1574 mL / OUT: 0 mL / NET: 1574 mL      CAPILLARY BLOOD GLUCOSE      POCT Blood Glucose.: 173 mg/dL (20 Mar 2025 10:00)      PHYSICAL EXAM:  General:   HEENT:   Neck:   Chest/Lungs:  Heart:  Abdomen:   Extremities:   Skin:   Neuro:   Psych:     LINES:     HOSPITAL MEDICATIONS:  MEDICATIONS  (STANDING):  allopurinol 100 milliGRAM(s) Oral daily  bisacodyl 5 milliGRAM(s) Oral at bedtime  chlorhexidine 2% Cloths 1 Application(s) Topical daily  deferasirox Tablet 720 milliGRAM(s) Oral daily  dextrose 5% + sodium chloride 0.9%. 1000 milliLiter(s) (75 mL/Hr) IV Continuous <Continuous>  dextrose 50% Injectable 12.5 Gram(s) IV Push once  folic acid 1 milliGRAM(s) Oral daily  HYDROmorphone PCA (1 mG/mL) 30 milliLiter(s) PCA Continuous PCA Continuous  influenza   Vaccine 0.5 milliLiter(s) IntraMuscular once  NIFEdipine XL 30 milliGRAM(s) Oral daily  norepinephrine Infusion 1 MICROgram(s)/kG/Min (167 mL/Hr) IV Continuous <Continuous>  oxybutynin 5 milliGRAM(s) Oral every 8 hours  phenylephrine    Infusion 1 MICROgram(s)/kG/Min (33.3 mL/Hr) IV Continuous <Continuous>  polyethylene glycol 3350 17 Gram(s) Oral daily  sodium chloride 0.45%. 1000 milliLiter(s) (30 mL/Hr) IV Continuous <Continuous>  tamsulosin 0.4 milliGRAM(s) Oral at bedtime    MEDICATIONS  (PRN):  acetaminophen     Tablet .. 650 milliGRAM(s) Oral every 6 hours PRN Temp greater or equal to 38C (100.4F), Mild Pain (1 - 3)  aluminum hydroxide/magnesium hydroxide/simethicone Suspension 30 milliLiter(s) Oral every 4 hours PRN Dyspepsia  ondansetron Injectable 4 milliGRAM(s) IV Push every 8 hours PRN Nausea and/or Vomiting      LABS:                        8.0    8.01  )-----------( 249      ( 19 Mar 2025 09:07 )             22.5         137  |  104  |  27[H]  ----------------------------<  84  5.0   |  24  |  0.85    Ca    9.4      19 Mar 2025 09:07  Mg     1.60         TPro  7.0  /  Alb  3.8  /  TBili  6.7[H]  /  DBili  x   /  AST  127[H]  /  ALT  34  /  AlkPhos  152[H]      PT/INR - ( 19 Mar 2025 09:07 )   PT: 12.5 sec;   INR: 1.05 ratio         PTT - ( 19 Mar 2025 09:07 )  PTT:35.3 sec        Urinalysis Basic - ( 19 Mar 2025 09:07 )    Color: x / Appearance: x / SG: x / pH: x  Gluc: 84 mg/dL / Ketone: x  / Bili: x / Urobili: x   Blood: x / Protein: x / Nitrite: x   Leuk Esterase: x / RBC: x / WBC x   Sq Epi: x / Non Sq Epi: x / Bacteria: x          CAPILLARY BLOOD GLUCOSE      POCT Blood Glucose.: 173 mg/dL (20 Mar 2025 10:00)  POCT Blood Glucose.: 49 mg/dL (20 Mar 2025 09:18)  POCT Blood Glucose.: 48 mg/dL (20 Mar 2025 09:17)  POCT Blood Glucose.: 51 mg/dL (20 Mar 2025 09:02)      RADIOLOGY & ADDITIONAL TESTS:    Patient is 44yMale with PMHx of sickle cell disease admitted for anemia concerning for sickle cell crisis, transferred to MICU after PEA arrest of unclear etiology, now intubated and on pressors for undifferentiated shock.     NEURO:  #Sedated    CV:  #Undifferentiated shock  - c/w levophed, map goal >65  - TTE  - CTPE when can tolerate, patient has hx of DVT and briefly off AC for  procedure   - f/u trop, CK     #Hx of DVT  Likely w/ chronic DVT   - consider starting heparin gtt     PULM:  #Acute hypoxic respiratory failure  In the setting of PEA arrest   imaging?   - Wean vent as tolerated       RENAL:  #MATA:   -UO:  -Dvae:  - Last creatinine:     #Electrolyte abnormalities      GI:  #Transaminitis:   Elevated LFTs    #Diet:    ENDO:  #DM2: HbA1c   - Mod dose insulin sliding scale       HEMATOLOGIC:  #Sickle cell crisis  - Daily hgb, retic, hapto, ldh, bili   - heme following       #DVT prophylaxis   - consider heparin gtt     ID: MICU Accept Note    CHIEF COMPLAINT:     HPI:  44M with PMHx of Sickle cell disease (last transfusion/exchange transfusion 2024), prior acute chest syndrome, iron overload, Gout, HTN and RUE DVT 2024 on eliquis (on hold since 3/15 for upcoming cystoscopy, ureteroscopy on 3/19) who was sent in by his Hematologist for transfusion/low Hg prior to procedure (Hg of 5.3). Patient c/o back/LE pain at time of visit secondary to being on sickle cell crisis. Denies SOB or chest pain. No recent fevers, chills or sick contacts. No cough. No nausea/vomiting or abdominal pain. No melena or BRBPR. No urinary complaints, denies hematuria. Good appetite.    In ER /85, , RR 18, Temp 98.7F, O2 Sat 88% RA-placed on 4L NC  Received Tylenol IV, benadryl 50mg IV, dilaudid 2mg IV x3, 1U PRBC (15 Mar 2025 09:54)      INTERVAL HISTORY:   Patient admitted for anemia found on outpatient labs concerning for sickle cell crisis. Patient received 1u PRBC and started on PCA pump with heme following. Urology consulted for prior planned cystoscopy w/ L ureteroscopy and bx now s/p L ureteroscopy w/ biopsy and stent placement on 3/19. 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 On 3/20, patient had RRT for hypoglycemia that resolved with d50, however had another RRT for repeated hypoglycemia, however patient found to be pulseless, code blue called for PEA. ROSC achieved in approximately 4 minutes and patient intubated and transferred to MICU.     PAST MEDICAL & SURGICAL HISTORY:  Sickle cell anemia      Gout      Deep vein thrombosis (DVT)      Iron overload      Hypertension      History of cholecystectomy      History of removal of Port-a-Cath          FAMILY HISTORY:  Family history of sickle cell trait (Father, Mother)    Patient's father is  (Father)    Patient's mother is  (Mother)        Social History:  Lives alone (15 Mar 2025 09:54)      HOME MEDICATIONS:  Home Medications:  allopurinol 100 mg oral tablet: 1 tab(s) orally once a day (15 Mar 2025 13:59)  Eliquis 5 mg oral tablet: 1 tab(s) orally 2 times a day (15 Mar 2025 13:59)  folic acid 1 mg oral tablet: 1 tab(s) orally every 24 hours (15 Mar 2025 13:59)  NIFEdipine 30 mg oral tablet, extended release: 1 tab(s) orally once a day (15 Mar 2025 13:59)  oxyCODONE 30 mg oral tablet: 1 tab(s) orally every 4 hours as needed for  severe pain (15 Mar 2025 13:59)      Allergies    penicillin (Pruritus)  hydroxyurea (Other)  piperacillin-tazobactam (Urticaria)  ceftriaxone (Anaphylaxis)    Intolerances        REVIEW OF SYSTEMS:  Constitutional: No fevers, chills, weight loss, weight gain  HEENT: No vision problems, eye pain, nasal congestion, rhinorrhea, sore throat, dysphagia  CV: No chest pain, orthopnea, palpitations  Resp: No cough, dyspnea, wheezing, hemoptysis  GI: No nausea, vomiting, diarrhea, constipation, abdominal pain  : [ ] dysuria [ ] nocturia [ ] hematuria [ ] increased urinary frequency  Musculoskeletal: [ ] back pain [ ] myalgias [ ] arthralgias [ ] fracture  Skin: [ ] rash [ ] itch  Neurological: [ ] headache [ ] dizziness [ ] syncope [ ] weakness [ ] numbness  Psychiatric: [ ] anxiety [ ] depression  Endocrine: [ ] diabetes [ ] thyroid problem  Hematologic/Lymphatic: [ ] anemia [ ] bleeding problem  Allergic/Immunologic: [ ] itchy eyes [ ] nasal discharge [ ] hives [ ] angioedema  [ ] All other systems negative  [ ] Unable to assess ROS because ________    OBJECTIVE:  ICU Vital Signs Last 24 Hrs  T(C): 36.6 (20 Mar 2025 05:40), Max: 37 (19 Mar 2025 11:05)  T(F): 97.9 (20 Mar 2025 05:40), Max: 98.6 (19 Mar 2025 11:05)  HR: 98 (20 Mar 2025 05:40) (74 - 138)  BP: 121/61 (20 Mar 2025 05:40) (117/63 - 180/100)  BP(mean): 106 (19 Mar 2025 20:45) (94 - 127)  ABP: --  ABP(mean): --  RR: 18 (20 Mar 2025 05:40) (10 - 25)  SpO2: 97% (20 Mar 2025 05:40) (91% - 100%)    O2 Parameters below as of 20 Mar 2025 05:40  Patient On (Oxygen Delivery Method): room air              03- @ 07:01  -   @ 07:00  --------------------------------------------------------  IN: 1574 mL / OUT: 0 mL / NET: 1574 mL      CAPILLARY BLOOD GLUCOSE      POCT Blood Glucose.: 173 mg/dL (20 Mar 2025 10:00)      PHYSICAL EXAM:  General:   HEENT:   Neck:   Chest/Lungs:  Heart:  Abdomen:   Extremities:   Skin:   Neuro:   Psych:     LINES:     HOSPITAL MEDICATIONS:  MEDICATIONS  (STANDING):  allopurinol 100 milliGRAM(s) Oral daily  bisacodyl 5 milliGRAM(s) Oral at bedtime  chlorhexidine 2% Cloths 1 Application(s) Topical daily  deferasirox Tablet 720 milliGRAM(s) Oral daily  dextrose 5% + sodium chloride 0.9%. 1000 milliLiter(s) (75 mL/Hr) IV Continuous <Continuous>  dextrose 50% Injectable 12.5 Gram(s) IV Push once  folic acid 1 milliGRAM(s) Oral daily  HYDROmorphone PCA (1 mG/mL) 30 milliLiter(s) PCA Continuous PCA Continuous  influenza   Vaccine 0.5 milliLiter(s) IntraMuscular once  NIFEdipine XL 30 milliGRAM(s) Oral daily  norepinephrine Infusion 1 MICROgram(s)/kG/Min (167 mL/Hr) IV Continuous <Continuous>  oxybutynin 5 milliGRAM(s) Oral every 8 hours  phenylephrine    Infusion 1 MICROgram(s)/kG/Min (33.3 mL/Hr) IV Continuous <Continuous>  polyethylene glycol 3350 17 Gram(s) Oral daily  sodium chloride 0.45%. 1000 milliLiter(s) (30 mL/Hr) IV Continuous <Continuous>  tamsulosin 0.4 milliGRAM(s) Oral at bedtime    MEDICATIONS  (PRN):  acetaminophen     Tablet .. 650 milliGRAM(s) Oral every 6 hours PRN Temp greater or equal to 38C (100.4F), Mild Pain (1 - 3)  aluminum hydroxide/magnesium hydroxide/simethicone Suspension 30 milliLiter(s) Oral every 4 hours PRN Dyspepsia  ondansetron Injectable 4 milliGRAM(s) IV Push every 8 hours PRN Nausea and/or Vomiting      LABS:                        8.0    8.01  )-----------( 249      ( 19 Mar 2025 09:07 )             22.5         137  |  104  |  27[H]  ----------------------------<  84  5.0   |  24  |  0.85    Ca    9.4      19 Mar 2025 09:07  Mg     1.60         TPro  7.0  /  Alb  3.8  /  TBili  6.7[H]  /  DBili  x   /  AST  127[H]  /  ALT  34  /  AlkPhos  152[H]      PT/INR - ( 19 Mar 2025 09:07 )   PT: 12.5 sec;   INR: 1.05 ratio         PTT - ( 19 Mar 2025 09:07 )  PTT:35.3 sec        Urinalysis Basic - ( 19 Mar 2025 09:07 )    Color: x / Appearance: x / SG: x / pH: x  Gluc: 84 mg/dL / Ketone: x  / Bili: x / Urobili: x   Blood: x / Protein: x / Nitrite: x   Leuk Esterase: x / RBC: x / WBC x   Sq Epi: x / Non Sq Epi: x / Bacteria: x          CAPILLARY BLOOD GLUCOSE      POCT Blood Glucose.: 173 mg/dL (20 Mar 2025 10:00)  POCT Blood Glucose.: 49 mg/dL (20 Mar 2025 09:18)  POCT Blood Glucose.: 48 mg/dL (20 Mar 2025 09:17)  POCT Blood Glucose.: 51 mg/dL (20 Mar 2025 09:02)      RADIOLOGY & ADDITIONAL TESTS:    Patient is 44yMale with PMHx of sickle cell disease admitted for anemia concerning for sickle cell crisis, transferred to MICU after PEA arrest of unclear etiology, now intubated and on pressors for undifferentiated shock.     NEURO:  #Sedated    CV:  #Undifferentiated shock  - c/w levophed, map goal >65  - TTE  - CTPE when can tolerate, patient has hx of DVT and briefly off AC for  procedure   - f/u trop, CK     #Hx of DVT  Likely w/ chronic DVT   - consider starting heparin gtt     PULM:  #Acute hypoxic respiratory failure  In the setting of PEA arrest   imaging?   - Wean vent as tolerated       RENAL:  #MATA:   -UO:  -Dave:  - Last creatinine:     #Electrolyte abnormalities      GI:  #Transaminitis:   Elevated LFTs    #Diet:    ENDO:  #DM2: HbA1c   - Mod dose insulin sliding scale       HEMATOLOGIC:  #Sickle cell crisis  - Daily hgb, retic, hapto, ldh, bili   - heme following       #DVT prophylaxis   - consider heparin gtt     ID:      *** Attending Addendum ***    44 year old male with a history of SSD, UE DVT, gout, HTN, with L renal mass admitted with acute anemia/ sickle cell crisis and evaluation of his left renal mass s/p left ureteroscopy and biopsy (3/19/25) with 24 hour post operative course complicated by rapid responses for hypoglycemia c/b cardiac arrest x 4 minutes CPR/1 epi with ROSC, transferred to MICU for further care     Found to be profoundly anemic, acidemic, with multiorgan dysfunction overall concerning for acute hemorrhagic shock    N: Sedation  - Prop 50  - RASS -3 to -4   - Last dose of A/C 3/18/25  CV: Shock  PEA arrest- suspect hemorrhage, POCUS with collapsible IVC, hyperdynamic LV  - Joseph 2 and Levo 1  - MAP >65  - Resuscitation: 1:1:1 RBC:Plt:FFP  - Coags, trend CBC closely   - Place 9 french introducer catheter  - Imaging once stable  - Call urology in light of recent procedure  - Formal TTE  P: Acute hypoxemic respiratory failure iso cardiac arrest  CT chest 3/15 with RUL nodule, mucus plugging, atalectasis  History of DVT, off A/C x 24 hours  - Lung protective ventilation  - Adjust based on BG  - SBT as able  - CTPE when able  R: MATA iso arrest, hypovolemic shock   Renal mass s/p ureteroscopy and biopsy with urology  Hyperkalemia  Profound acidemia  - Dave  - Call urology   - Trend Cr, uop, lytes  - Calcium and bicarb  - Bicarb drip  GI:   - OG access  Heme: SSD  Pain crisis   Acute blood loss anemia  - Resus with 1:1:1 for hemorrhagic shock  - Off all A/C (last dose 3/18, no need to reverse)  - Coags stat  - Trend hemolysis labs  - Trend CBC  - IVF  Endo: Hypoglycemia  - D5 with bicarb  - Monitor BG q 1 hour for now  - Avoid hypoglycemia  ID:   - Empiric antibiotics: Aztreonam and Flagyl, Vanc for GPC in light of recent procedure; overall low suspicion that this is profound sepsis and septic shock  - Pan culture, will follow       DVT: SCD  Full Code  Updated family at bedside MICU Accept Note    CHIEF COMPLAINT:     HPI:  44M with PMHx of Sickle cell disease (last transfusion/exchange transfusion 2024), prior acute chest syndrome, iron overload, Gout, HTN and RUE DVT 2024 on eliquis (on hold since 3/15 for upcoming cystoscopy, ureteroscopy on 3/19) who was sent in by his Hematologist for transfusion/low Hg prior to procedure (Hg of 5.3). Patient c/o back/LE pain at time of visit secondary to being on sickle cell crisis. Denies SOB or chest pain. No recent fevers, chills or sick contacts. No cough. No nausea/vomiting or abdominal pain. No melena or BRBPR. No urinary complaints, denies hematuria. Good appetite.    In ER /85, , RR 18, Temp 98.7F, O2 Sat 88% RA-placed on 4L NC  Received Tylenol IV, benadryl 50mg IV, dilaudid 2mg IV x3, 1U PRBC (15 Mar 2025 09:54)      INTERVAL HISTORY:   Patient admitted for anemia found on outpatient labs concerning for sickle cell crisis. Patient received 1u PRBC and started on PCA pump with heme following. Urology consulted for prior planned cystoscopy w/ L ureteroscopy and bx now s/p L ureteroscopy w/ biopsy and stent placement on 3/19. 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 On 3/20, patient had RRT for hypoglycemia that resolved with d50, however had another RRT for repeated hypoglycemia, however patient found to be pulseless, code blue called for PEA. ROSC achieved in approximately 4 minutes and patient intubated and transferred to MICU.     PAST MEDICAL & SURGICAL HISTORY:  Sickle cell anemia      Gout      Deep vein thrombosis (DVT)      Iron overload      Hypertension      History of cholecystectomy      History of removal of Port-a-Cath          FAMILY HISTORY:  Family history of sickle cell trait (Father, Mother)    Patient's father is  (Father)    Patient's mother is  (Mother)        Social History:  Lives alone (15 Mar 2025 09:54)      HOME MEDICATIONS:  Home Medications:  allopurinol 100 mg oral tablet: 1 tab(s) orally once a day (15 Mar 2025 13:59)  Eliquis 5 mg oral tablet: 1 tab(s) orally 2 times a day (15 Mar 2025 13:59)  folic acid 1 mg oral tablet: 1 tab(s) orally every 24 hours (15 Mar 2025 13:59)  NIFEdipine 30 mg oral tablet, extended release: 1 tab(s) orally once a day (15 Mar 2025 13:59)  oxyCODONE 30 mg oral tablet: 1 tab(s) orally every 4 hours as needed for  severe pain (15 Mar 2025 13:59)      Allergies    penicillin (Pruritus)  hydroxyurea (Other)  piperacillin-tazobactam (Urticaria)  ceftriaxone (Anaphylaxis)    Intolerances        REVIEW OF SYSTEMS:  Constitutional: No fevers, chills, weight loss, weight gain  HEENT: No vision problems, eye pain, nasal congestion, rhinorrhea, sore throat, dysphagia  CV: No chest pain, orthopnea, palpitations  Resp: No cough, dyspnea, wheezing, hemoptysis  GI: No nausea, vomiting, diarrhea, constipation, abdominal pain  : [ ] dysuria [ ] nocturia [ ] hematuria [ ] increased urinary frequency  Musculoskeletal: [ ] back pain [ ] myalgias [ ] arthralgias [ ] fracture  Skin: [ ] rash [ ] itch  Neurological: [ ] headache [ ] dizziness [ ] syncope [ ] weakness [ ] numbness  Psychiatric: [ ] anxiety [ ] depression  Endocrine: [ ] diabetes [ ] thyroid problem  Hematologic/Lymphatic: [ ] anemia [ ] bleeding problem  Allergic/Immunologic: [ ] itchy eyes [ ] nasal discharge [ ] hives [ ] angioedema  [ ] All other systems negative  [ ] Unable to assess ROS because ________    OBJECTIVE:  ICU Vital Signs Last 24 Hrs  T(C): 36.6 (20 Mar 2025 05:40), Max: 37 (19 Mar 2025 11:05)  T(F): 97.9 (20 Mar 2025 05:40), Max: 98.6 (19 Mar 2025 11:05)  HR: 98 (20 Mar 2025 05:40) (74 - 138)  BP: 121/61 (20 Mar 2025 05:40) (117/63 - 180/100)  BP(mean): 106 (19 Mar 2025 20:45) (94 - 127)  ABP: --  ABP(mean): --  RR: 18 (20 Mar 2025 05:40) (10 - 25)  SpO2: 97% (20 Mar 2025 05:40) (91% - 100%)    O2 Parameters below as of 20 Mar 2025 05:40  Patient On (Oxygen Delivery Method): room air              03- @ 07:01  -   @ 07:00  --------------------------------------------------------  IN: 1574 mL / OUT: 0 mL / NET: 1574 mL      CAPILLARY BLOOD GLUCOSE      POCT Blood Glucose.: 173 mg/dL (20 Mar 2025 10:00)      PHYSICAL EXAM:  General:   HEENT:   Neck:   Chest/Lungs:  Heart:  Abdomen:   Extremities:   Skin:   Neuro:   Psych:     LINES:     HOSPITAL MEDICATIONS:  MEDICATIONS  (STANDING):  allopurinol 100 milliGRAM(s) Oral daily  bisacodyl 5 milliGRAM(s) Oral at bedtime  chlorhexidine 2% Cloths 1 Application(s) Topical daily  deferasirox Tablet 720 milliGRAM(s) Oral daily  dextrose 5% + sodium chloride 0.9%. 1000 milliLiter(s) (75 mL/Hr) IV Continuous <Continuous>  dextrose 50% Injectable 12.5 Gram(s) IV Push once  folic acid 1 milliGRAM(s) Oral daily  HYDROmorphone PCA (1 mG/mL) 30 milliLiter(s) PCA Continuous PCA Continuous  influenza   Vaccine 0.5 milliLiter(s) IntraMuscular once  NIFEdipine XL 30 milliGRAM(s) Oral daily  norepinephrine Infusion 1 MICROgram(s)/kG/Min (167 mL/Hr) IV Continuous <Continuous>  oxybutynin 5 milliGRAM(s) Oral every 8 hours  phenylephrine    Infusion 1 MICROgram(s)/kG/Min (33.3 mL/Hr) IV Continuous <Continuous>  polyethylene glycol 3350 17 Gram(s) Oral daily  sodium chloride 0.45%. 1000 milliLiter(s) (30 mL/Hr) IV Continuous <Continuous>  tamsulosin 0.4 milliGRAM(s) Oral at bedtime    MEDICATIONS  (PRN):  acetaminophen     Tablet .. 650 milliGRAM(s) Oral every 6 hours PRN Temp greater or equal to 38C (100.4F), Mild Pain (1 - 3)  aluminum hydroxide/magnesium hydroxide/simethicone Suspension 30 milliLiter(s) Oral every 4 hours PRN Dyspepsia  ondansetron Injectable 4 milliGRAM(s) IV Push every 8 hours PRN Nausea and/or Vomiting      LABS:                        8.0    8.01  )-----------( 249      ( 19 Mar 2025 09:07 )             22.5         137  |  104  |  27[H]  ----------------------------<  84  5.0   |  24  |  0.85    Ca    9.4      19 Mar 2025 09:07  Mg     1.60         TPro  7.0  /  Alb  3.8  /  TBili  6.7[H]  /  DBili  x   /  AST  127[H]  /  ALT  34  /  AlkPhos  152[H]      PT/INR - ( 19 Mar 2025 09:07 )   PT: 12.5 sec;   INR: 1.05 ratio         PTT - ( 19 Mar 2025 09:07 )  PTT:35.3 sec        Urinalysis Basic - ( 19 Mar 2025 09:07 )    Color: x / Appearance: x / SG: x / pH: x  Gluc: 84 mg/dL / Ketone: x  / Bili: x / Urobili: x   Blood: x / Protein: x / Nitrite: x   Leuk Esterase: x / RBC: x / WBC x   Sq Epi: x / Non Sq Epi: x / Bacteria: x          CAPILLARY BLOOD GLUCOSE      POCT Blood Glucose.: 173 mg/dL (20 Mar 2025 10:00)  POCT Blood Glucose.: 49 mg/dL (20 Mar 2025 09:18)  POCT Blood Glucose.: 48 mg/dL (20 Mar 2025 09:17)  POCT Blood Glucose.: 51 mg/dL (20 Mar 2025 09:02)      RADIOLOGY & ADDITIONAL TESTS:    Patient is 44yMale with PMHx of sickle cell disease admitted for anemia concerning for sickle cell crisis, transferred to MICU after PEA arrest of unclear etiology, now intubated and on pressors for undifferentiated shock.     NEURO:  #Sedated    CV:  #Undifferentiated shock  - c/w levophed, map goal >65  - TTE  - CTPE when can tolerate, patient has hx of DVT and briefly off AC for  procedure   - f/u trop, CK     #Hx of DVT  Likely w/ chronic DVT   - consider starting heparin gtt     PULM:  #Acute hypoxic respiratory failure  In the setting of PEA arrest   imaging?   - Wean vent as tolerated       RENAL:  #MATA:   -UO:  -Dave:  - Last creatinine:     #Electrolyte abnormalities      GI:  #Transaminitis:   Elevated LFTs    #Diet:    ENDO:  #DM2: HbA1c   - Mod dose insulin sliding scale       HEMATOLOGIC:  #Sickle cell crisis  - Daily hgb, retic, hapto, ldh, bili   - heme following       #DVT prophylaxis   - consider heparin gtt     ID:      *** Attending Addendum ***    44 year old male with a history of SSD, UE DVT, gout, HTN, with L renal mass admitted with acute anemia/ sickle cell crisis and evaluation of his left renal mass s/p left ureteroscopy and biopsy (3/19/25) with 24 hour post operative course complicated by rapid responses for hypoglycemia c/b cardiac arrest x 4 minutes CPR/1 epi with ROSC, transferred to MICU for further care     Found to be profoundly anemic, acidemic, with multiorgan dysfunction overall concerning for acute hemorrhagic shock    N: Sedation  - Prop 50  - RASS -3 to -4   - Last dose of A/C 3/18/25  CV: Shock  PEA arrest- suspect hemorrhage, POCUS with collapsible IVC, hyperdynamic LV  - Joseph 2 and Levo 1  - MAP >65  - Resuscitation: 1:1:1 RBC:Plt:FFP  - Coags, trend CBC closely   - Place 9 french introducer catheter  - Imaging once stable  - Call urology in light of recent procedure  - Dry CT for ?bleed  - Formal TTE  P: Acute hypoxemic respiratory failure iso cardiac arrest  CT chest 3/15 with RUL nodule, mucus plugging, atalectasis  History of DVT, off A/C x 24 hours  - Lung protective ventilation  - Adjust based on BG  - SBT as able  R: MATA iso arrest, hypovolemic shock   Renal mass s/p ureteroscopy and biopsy with urology  Hyperkalemia  Profound acidemia  - Dave  - Call urology   - Trend Cr, uop, lytes  - Calcium and bicarb  - Bicarb drip  GI:   - OG access  Heme: SSD  Pain crisis   Acute blood loss anemia  - Resus with 1:1:1 for hemorrhagic shock  - Off all A/C (last dose 3/18, no need to reverse)  - Coags stat  - Trend hemolysis labs  - Trend CBC  - IVF  Endo: Hypoglycemia  - D5 with bicarb  - Monitor BG q 1 hour for now  - Avoid hypoglycemia  ID:   - Empiric antibiotics: Aztreonam and Flagyl, Vanc for GPC in light of recent procedure; overall low suspicion that this is profound sepsis and septic shock  - Pan culture, will follow       DVT: SCD  Full Code  Updated family at bedside MICU Accept Note    CHIEF COMPLAINT:     HPI:  44M with PMHx of Sickle cell disease (last transfusion/exchange transfusion 2024), prior acute chest syndrome, iron overload, Gout, HTN and RUE DVT 2024 on eliquis (on hold since 3/15 for upcoming cystoscopy, ureteroscopy on 3/19) who was sent in by his Hematologist for transfusion/low Hg prior to procedure (Hg of 5.3). Patient c/o back/LE pain at time of visit secondary to being on sickle cell crisis. Denies SOB or chest pain. No recent fevers, chills or sick contacts. No cough. No nausea/vomiting or abdominal pain. No melena or BRBPR. No urinary complaints, denies hematuria. Good appetite.    In ER /85, , RR 18, Temp 98.7F, O2 Sat 88% RA-placed on 4L NC  Received Tylenol IV, benadryl 50mg IV, dilaudid 2mg IV x3, 1U PRBC (15 Mar 2025 09:54)      INTERVAL HISTORY:   Patient admitted for anemia found on outpatient labs concerning for sickle cell crisis. Patient received 1u PRBC and started on PCA pump with heme following. Urology consulted for prior planned cystoscopy w/ L ureteroscopy and bx now s/p L ureteroscopy w/ biopsy and stent placement on 3/19. 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 On 3/20, patient had RRT for hypoglycemia that resolved with d50, however had another RRT for repeated hypoglycemia, however patient found to be pulseless, code blue called for PEA. ROSC achieved in approximately 4 minutes and patient intubated and transferred to MICU.     PAST MEDICAL & SURGICAL HISTORY:  Sickle cell anemia      Gout      Deep vein thrombosis (DVT)      Iron overload      Hypertension      History of cholecystectomy      History of removal of Port-a-Cath          FAMILY HISTORY:  Family history of sickle cell trait (Father, Mother)    Patient's father is  (Father)    Patient's mother is  (Mother)        Social History:  Lives alone (15 Mar 2025 09:54)      HOME MEDICATIONS:  Home Medications:  allopurinol 100 mg oral tablet: 1 tab(s) orally once a day (15 Mar 2025 13:59)  Eliquis 5 mg oral tablet: 1 tab(s) orally 2 times a day (15 Mar 2025 13:59)  folic acid 1 mg oral tablet: 1 tab(s) orally every 24 hours (15 Mar 2025 13:59)  NIFEdipine 30 mg oral tablet, extended release: 1 tab(s) orally once a day (15 Mar 2025 13:59)  oxyCODONE 30 mg oral tablet: 1 tab(s) orally every 4 hours as needed for  severe pain (15 Mar 2025 13:59)      Allergies    penicillin (Pruritus)  hydroxyurea (Other)  piperacillin-tazobactam (Urticaria)  ceftriaxone (Anaphylaxis)    Intolerances        REVIEW OF SYSTEMS:  Constitutional: No fevers, chills, weight loss, weight gain  HEENT: No vision problems, eye pain, nasal congestion, rhinorrhea, sore throat, dysphagia  CV: No chest pain, orthopnea, palpitations  Resp: No cough, dyspnea, wheezing, hemoptysis  GI: No nausea, vomiting, diarrhea, constipation, abdominal pain  : [ ] dysuria [ ] nocturia [ ] hematuria [ ] increased urinary frequency  Musculoskeletal: [ ] back pain [ ] myalgias [ ] arthralgias [ ] fracture  Skin: [ ] rash [ ] itch  Neurological: [ ] headache [ ] dizziness [ ] syncope [ ] weakness [ ] numbness  Psychiatric: [ ] anxiety [ ] depression  Endocrine: [ ] diabetes [ ] thyroid problem  Hematologic/Lymphatic: [ ] anemia [ ] bleeding problem  Allergic/Immunologic: [ ] itchy eyes [ ] nasal discharge [ ] hives [ ] angioedema  [ ] All other systems negative  [ ] Unable to assess ROS because ________    OBJECTIVE:  ICU Vital Signs Last 24 Hrs  T(C): 36.6 (20 Mar 2025 05:40), Max: 37 (19 Mar 2025 11:05)  T(F): 97.9 (20 Mar 2025 05:40), Max: 98.6 (19 Mar 2025 11:05)  HR: 98 (20 Mar 2025 05:40) (74 - 138)  BP: 121/61 (20 Mar 2025 05:40) (117/63 - 180/100)  BP(mean): 106 (19 Mar 2025 20:45) (94 - 127)  ABP: --  ABP(mean): --  RR: 18 (20 Mar 2025 05:40) (10 - 25)  SpO2: 97% (20 Mar 2025 05:40) (91% - 100%)    O2 Parameters below as of 20 Mar 2025 05:40  Patient On (Oxygen Delivery Method): room air              03- @ 07:01  -  - @ 07:00  --------------------------------------------------------  IN: 1574 mL / OUT: 0 mL / NET: 1574 mL      CAPILLARY BLOOD GLUCOSE      POCT Blood Glucose.: 173 mg/dL (20 Mar 2025 10:00)      PHYSICAL EXAM:  GENERAL: Sedated  EYES: EOMI, PERRL  NECK: Supple, trachea midline, no JVD  HEART: tachycardic, RRR   LUNGS: Unlabored respirations.  Clear to auscultation bilaterally, no crackles, wheezing, or rhonchi  ABDOMEN: Soft, nontender, nondistended, +BS  EXTREMITIES: 2+ peripheral pulses bilaterally. No clubbing, cyanosis, or edema  : Dave with dark brown urine   NERVOUS SYSTEM:  Sedated, unable to do complete neuro exam     LINES:     HOSPITAL MEDICATIONS:  MEDICATIONS  (STANDING):  allopurinol 100 milliGRAM(s) Oral daily  bisacodyl 5 milliGRAM(s) Oral at bedtime  chlorhexidine 2% Cloths 1 Application(s) Topical daily  deferasirox Tablet 720 milliGRAM(s) Oral daily  dextrose 5% + sodium chloride 0.9%. 1000 milliLiter(s) (75 mL/Hr) IV Continuous <Continuous>  dextrose 50% Injectable 12.5 Gram(s) IV Push once  folic acid 1 milliGRAM(s) Oral daily  HYDROmorphone PCA (1 mG/mL) 30 milliLiter(s) PCA Continuous PCA Continuous  influenza   Vaccine 0.5 milliLiter(s) IntraMuscular once  NIFEdipine XL 30 milliGRAM(s) Oral daily  norepinephrine Infusion 1 MICROgram(s)/kG/Min (167 mL/Hr) IV Continuous <Continuous>  oxybutynin 5 milliGRAM(s) Oral every 8 hours  phenylephrine    Infusion 1 MICROgram(s)/kG/Min (33.3 mL/Hr) IV Continuous <Continuous>  polyethylene glycol 3350 17 Gram(s) Oral daily  sodium chloride 0.45%. 1000 milliLiter(s) (30 mL/Hr) IV Continuous <Continuous>  tamsulosin 0.4 milliGRAM(s) Oral at bedtime    MEDICATIONS  (PRN):  acetaminophen     Tablet .. 650 milliGRAM(s) Oral every 6 hours PRN Temp greater or equal to 38C (100.4F), Mild Pain (1 - 3)  aluminum hydroxide/magnesium hydroxide/simethicone Suspension 30 milliLiter(s) Oral every 4 hours PRN Dyspepsia  ondansetron Injectable 4 milliGRAM(s) IV Push every 8 hours PRN Nausea and/or Vomiting      LABS:                        8.0    8.01  )-----------( 249      ( 19 Mar 2025 09:07 )             22.5     03-    137  |  104  |  27[H]  ----------------------------<  84  5.0   |  24  |  0.85    Ca    9.4      19 Mar 2025 09:07  Mg     1.60     -    TPro  7.0  /  Alb  3.8  /  TBili  6.7[H]  /  DBili  x   /  AST  127[H]  /  ALT  34  /  AlkPhos  152[H]  03-19    PT/INR - ( 19 Mar 2025 09:07 )   PT: 12.5 sec;   INR: 1.05 ratio         PTT - ( 19 Mar 2025 09:07 )  PTT:35.3 sec        Urinalysis Basic - ( 19 Mar 2025 09:07 )    Color: x / Appearance: x / SG: x / pH: x  Gluc: 84 mg/dL / Ketone: x  / Bili: x / Urobili: x   Blood: x / Protein: x / Nitrite: x   Leuk Esterase: x / RBC: x / WBC x   Sq Epi: x / Non Sq Epi: x / Bacteria: x          CAPILLARY BLOOD GLUCOSE      POCT Blood Glucose.: 173 mg/dL (20 Mar 2025 10:00)  POCT Blood Glucose.: 49 mg/dL (20 Mar 2025 09:18)  POCT Blood Glucose.: 48 mg/dL (20 Mar 2025 09:17)  POCT Blood Glucose.: 51 mg/dL (20 Mar 2025 09:02)      RADIOLOGY & ADDITIONAL TESTS:    Patient is 44yMale with PMHx of sickle cell disease admitted for anemia concerning for sickle cell crisis s/p ureteroscopy w/ L kidney biopsy, c/c/b PEA arrest of unclear etiology, now intubated and on pressors for possible hemorrhagic shock     NEURO:  #Sedated  - Propofol  - can consider versed pushes for sedation     CV:  #Shock  Suspected hemorrhagic shock s/p kidney biopsy  last dose of lovenox AC on 3/18 at 5PM   Hgb 8 -> ~3   - c/w levophed, map goal >65  - c/w joseph, map goal >65  - Resuscitation 1:1:1 RBC:Plt:FFP  - Coags, trend CBC after transfusion  - Urology f/u   - TTE  - CTPE when can tolerate  - f/u trop, CK     #Hx of DVT  Likely w/ chronic DVT   - CTPE when able  - Can consider heparin gtt IF not suspected for hemorrhagic shock     PULM:  #Acute hypoxic respiratory failure  In the setting of PEA arrest   - Wean vent as tolerated       RENAL:  #MATA  #Hyperkalemia  Prerenal MATA likely iso hemorrhaging   - Dave  - urology contacted  - Trend Cr, uop, lytes  - bicarb drip       GI:  #Transaminitis:   Elevated LFTs    #Diet:  - NPO   - OG access     ENDO:  #DM2: HbA1c   - Mod dose insulin sliding scale       HEMATOLOGIC:  #Shock possibly 2/2 to hemorrhaging  - Resuscitation as above     #Sickle cell crisis  - Daily hgb, retic, hapto, ldh, bili   - heme following       #DVT prophylaxis   - SCD    Endo:  #Hypoglycemia  - D5 w/ bicarb  - BG q1   - No glycemic agents     ID:  - Empiric antibiotics: aztrename, flagyl, x1 vanc  - F/u cultures       *** Attending Addendum ***    44 year old male with a history of SSD, UE DVT, gout, HTN, with L renal mass admitted with acute anemia/ sickle cell crisis and evaluation of his left renal mass s/p left ureteroscopy and biopsy (3/19/25) with 24 hour post operative course complicated by rapid responses for hypoglycemia c/b cardiac arrest x 4 minutes CPR/1 epi with ROSC, transferred to MICU for further care     Found to be profoundly anemic, acidemic, with multiorgan dysfunction overall concerning for acute hemorrhagic shock    N: Sedation  - Prop 50  - RASS -3 to -4   - Last dose of A/C 3/18/25  CV: Shock  PEA arrest- suspect hemorrhage, POCUS with collapsible IVC, hyperdynamic LV  - Joseph 2 and Levo 1  - MAP >65  - Resuscitation: 1:1:1 RBC:Plt:FFP  - Coags, trend CBC closely   - Place 9 french introducer catheter  - Imaging once stable  - Call urology in light of recent procedure  - Dry CT for ?bleed  - Formal TTE  P: Acute hypoxemic respiratory failure iso cardiac arrest  CT chest 3/15 with RUL nodule, mucus plugging, atalectasis  History of DVT, off A/C x 24 hours  - Lung protective ventilation  - Adjust based on BG  - SBT as able  R: MATA iso arrest, hypovolemic shock   Renal mass s/p ureteroscopy and biopsy with urology  Hyperkalemia  Profound acidemia  - Dave  - Call urology   - Trend Cr, uop, lytes  - Calcium and bicarb  - Bicarb drip  GI:   - OG access  Heme: SSD  Pain crisis   Acute blood loss anemia  - Resus with 1:1:1 for hemorrhagic shock  - Off all A/C (last dose 3/18, no need to reverse)  - Coags stat  - Trend hemolysis labs  - Trend CBC  - IVF  Endo: Hypoglycemia  - D5 with bicarb  - Monitor BG q 1 hour for now  - Avoid hypoglycemia  ID:   - Empiric antibiotics: Aztreonam and Flagyl, Vanc for GPC in light of recent procedure; overall low suspicion that this is profound sepsis and septic shock  - Pan culture, will follow       DVT: SCD  Full Code  Updated family at bedside MICU Accept Note    CHIEF COMPLAINT:     HPI:  44M with PMHx of Sickle cell disease (last transfusion/exchange transfusion 2024), prior acute chest syndrome, iron overload, Gout, HTN and RUE DVT 2024 on eliquis (on hold since 3/15 for upcoming cystoscopy, ureteroscopy on 3/19) who was sent in by his Hematologist for transfusion/low Hg prior to procedure (Hg of 5.3). Patient c/o back/LE pain at time of visit secondary to being on sickle cell crisis. Denies SOB or chest pain. No recent fevers, chills or sick contacts. No cough. No nausea/vomiting or abdominal pain. No melena or BRBPR. No urinary complaints, denies hematuria. Good appetite.    In ER /85, , RR 18, Temp 98.7F, O2 Sat 88% RA-placed on 4L NC  Received Tylenol IV, benadryl 50mg IV, dilaudid 2mg IV x3, 1U PRBC (15 Mar 2025 09:54)      INTERVAL HISTORY:   Patient admitted for anemia found on outpatient labs concerning for sickle cell crisis. Patient received 1u PRBC and started on PCA pump with heme following. Urology consulted for prior planned cystoscopy w/ L ureteroscopy and bx now s/p L ureteroscopy w/ biopsy and stent placement on 3/19. 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 On 3/20, patient had RRT for hypoglycemia that resolved with d50, however had another RRT for repeated hypoglycemia, however patient found to be pulseless, code blue called for PEA. ROSC achieved in approximately 4 minutes and patient intubated and transferred to MICU.     PAST MEDICAL & SURGICAL HISTORY:  Sickle cell anemia      Gout      Deep vein thrombosis (DVT)      Iron overload      Hypertension      History of cholecystectomy      History of removal of Port-a-Cath          FAMILY HISTORY:  Family history of sickle cell trait (Father, Mother)    Patient's father is  (Father)    Patient's mother is  (Mother)        Social History:  Lives alone (15 Mar 2025 09:54)      HOME MEDICATIONS:  Home Medications:  allopurinol 100 mg oral tablet: 1 tab(s) orally once a day (15 Mar 2025 13:59)  Eliquis 5 mg oral tablet: 1 tab(s) orally 2 times a day (15 Mar 2025 13:59)  folic acid 1 mg oral tablet: 1 tab(s) orally every 24 hours (15 Mar 2025 13:59)  NIFEdipine 30 mg oral tablet, extended release: 1 tab(s) orally once a day (15 Mar 2025 13:59)  oxyCODONE 30 mg oral tablet: 1 tab(s) orally every 4 hours as needed for  severe pain (15 Mar 2025 13:59)      Allergies    penicillin (Pruritus)  hydroxyurea (Other)  piperacillin-tazobactam (Urticaria)  ceftriaxone (Anaphylaxis)    Intolerances        REVIEW OF SYSTEMS:  Constitutional: No fevers, chills, weight loss, weight gain  HEENT: No vision problems, eye pain, nasal congestion, rhinorrhea, sore throat, dysphagia  CV: No chest pain, orthopnea, palpitations  Resp: No cough, dyspnea, wheezing, hemoptysis  GI: No nausea, vomiting, diarrhea, constipation, abdominal pain  : [ ] dysuria [ ] nocturia [ ] hematuria [ ] increased urinary frequency  Musculoskeletal: [ ] back pain [ ] myalgias [ ] arthralgias [ ] fracture  Skin: [ ] rash [ ] itch  Neurological: [ ] headache [ ] dizziness [ ] syncope [ ] weakness [ ] numbness  Psychiatric: [ ] anxiety [ ] depression  Endocrine: [ ] diabetes [ ] thyroid problem  Hematologic/Lymphatic: [ ] anemia [ ] bleeding problem  Allergic/Immunologic: [ ] itchy eyes [ ] nasal discharge [ ] hives [ ] angioedema  [ ] All other systems negative  [ ] Unable to assess ROS because ________    OBJECTIVE:  ICU Vital Signs Last 24 Hrs  T(C): 36.6 (20 Mar 2025 05:40), Max: 37 (19 Mar 2025 11:05)  T(F): 97.9 (20 Mar 2025 05:40), Max: 98.6 (19 Mar 2025 11:05)  HR: 98 (20 Mar 2025 05:40) (74 - 138)  BP: 121/61 (20 Mar 2025 05:40) (117/63 - 180/100)  BP(mean): 106 (19 Mar 2025 20:45) (94 - 127)  ABP: --  ABP(mean): --  RR: 18 (20 Mar 2025 05:40) (10 - 25)  SpO2: 97% (20 Mar 2025 05:40) (91% - 100%)    O2 Parameters below as of 20 Mar 2025 05:40  Patient On (Oxygen Delivery Method): room air              03- @ 07:01  -  - @ 07:00  --------------------------------------------------------  IN: 1574 mL / OUT: 0 mL / NET: 1574 mL      CAPILLARY BLOOD GLUCOSE      POCT Blood Glucose.: 173 mg/dL (20 Mar 2025 10:00)      PHYSICAL EXAM:  GENERAL: Sedated  EYES: EOMI, PERRL  NECK: Supple, trachea midline, no JVD  HEART: tachycardic, RRR   LUNGS: Unlabored respirations.  Clear to auscultation bilaterally, no crackles, wheezing, or rhonchi  ABDOMEN: Soft, nontender, nondistended, +BS  EXTREMITIES: 2+ peripheral pulses bilaterally. No clubbing, cyanosis, or edema  : Dave with dark brown urine   NERVOUS SYSTEM:  Sedated, unable to do complete neuro exam     LINES:     HOSPITAL MEDICATIONS:  MEDICATIONS  (STANDING):  allopurinol 100 milliGRAM(s) Oral daily  bisacodyl 5 milliGRAM(s) Oral at bedtime  chlorhexidine 2% Cloths 1 Application(s) Topical daily  deferasirox Tablet 720 milliGRAM(s) Oral daily  dextrose 5% + sodium chloride 0.9%. 1000 milliLiter(s) (75 mL/Hr) IV Continuous <Continuous>  dextrose 50% Injectable 12.5 Gram(s) IV Push once  folic acid 1 milliGRAM(s) Oral daily  HYDROmorphone PCA (1 mG/mL) 30 milliLiter(s) PCA Continuous PCA Continuous  influenza   Vaccine 0.5 milliLiter(s) IntraMuscular once  NIFEdipine XL 30 milliGRAM(s) Oral daily  norepinephrine Infusion 1 MICROgram(s)/kG/Min (167 mL/Hr) IV Continuous <Continuous>  oxybutynin 5 milliGRAM(s) Oral every 8 hours  phenylephrine    Infusion 1 MICROgram(s)/kG/Min (33.3 mL/Hr) IV Continuous <Continuous>  polyethylene glycol 3350 17 Gram(s) Oral daily  sodium chloride 0.45%. 1000 milliLiter(s) (30 mL/Hr) IV Continuous <Continuous>  tamsulosin 0.4 milliGRAM(s) Oral at bedtime    MEDICATIONS  (PRN):  acetaminophen     Tablet .. 650 milliGRAM(s) Oral every 6 hours PRN Temp greater or equal to 38C (100.4F), Mild Pain (1 - 3)  aluminum hydroxide/magnesium hydroxide/simethicone Suspension 30 milliLiter(s) Oral every 4 hours PRN Dyspepsia  ondansetron Injectable 4 milliGRAM(s) IV Push every 8 hours PRN Nausea and/or Vomiting      LABS:                        8.0    8.01  )-----------( 249      ( 19 Mar 2025 09:07 )             22.5     03-    137  |  104  |  27[H]  ----------------------------<  84  5.0   |  24  |  0.85    Ca    9.4      19 Mar 2025 09:07  Mg     1.60     -    TPro  7.0  /  Alb  3.8  /  TBili  6.7[H]  /  DBili  x   /  AST  127[H]  /  ALT  34  /  AlkPhos  152[H]  03-19    PT/INR - ( 19 Mar 2025 09:07 )   PT: 12.5 sec;   INR: 1.05 ratio         PTT - ( 19 Mar 2025 09:07 )  PTT:35.3 sec        Urinalysis Basic - ( 19 Mar 2025 09:07 )    Color: x / Appearance: x / SG: x / pH: x  Gluc: 84 mg/dL / Ketone: x  / Bili: x / Urobili: x   Blood: x / Protein: x / Nitrite: x   Leuk Esterase: x / RBC: x / WBC x   Sq Epi: x / Non Sq Epi: x / Bacteria: x          CAPILLARY BLOOD GLUCOSE      POCT Blood Glucose.: 173 mg/dL (20 Mar 2025 10:00)  POCT Blood Glucose.: 49 mg/dL (20 Mar 2025 09:18)  POCT Blood Glucose.: 48 mg/dL (20 Mar 2025 09:17)  POCT Blood Glucose.: 51 mg/dL (20 Mar 2025 09:02)      RADIOLOGY & ADDITIONAL TESTS:    Patient is 44yMale with PMHx of sickle cell disease admitted for anemia concerning for sickle cell crisis s/p ureteroscopy w/ L kidney biopsy, c/c/b PEA arrest of unclear etiology, now intubated and on pressors for possible hemorrhagic shock     NEURO:  #Sedated  - Propofol  - can consider versed pushes for sedation     CV:  #Shock  Suspected hemorrhagic shock s/p kidney biopsy  last dose of lovenox AC on 3/18 at 5PM   Hgb 8 -> ~3   - c/w levophed, map goal >65  - c/w joseph, map goal >65  - Resuscitation 1:1:1 RBC:Plt:FFP  - Coags, trend CBC after transfusion  - Urology f/u   - TTE  - CTPE when can tolerate  - f/u trop, CK     #Hx of DVT  Likely w/ chronic DVT   - CTPE when able  - Can consider heparin gtt IF not suspected for hemorrhagic shock     PULM:  #Acute hypoxic respiratory failure  In the setting of PEA arrest   - Wean vent as tolerated       RENAL:  #MATA  #Hyperkalemia  Prerenal MATA likely iso hemorrhaging   - Dave  - urology contacted  - Trend Cr, uop, lytes  - bicarb drip       GI:  #Transaminitis:   Elevated LFTs    #Diet:  - NPO   - OG access     ENDO:  #DM2: HbA1c   - Mod dose insulin sliding scale       HEMATOLOGIC:  #Shock possibly 2/2 to hemorrhaging  - Resuscitation as above     #Sickle cell crisis  - Daily hgb, retic, hapto, ldh, bili   - heme following       #DVT prophylaxis   - SCD    Endo:  #Hypoglycemia  - D5 w/ bicarb  - BG q1   - No glycemic agents     ID:  - Empiric antibiotics: aztrename, flagyl, x1 vanc  - F/u cultures       *** Attending Addendum ***    44 year old male with a history of SSD, UE DVT, gout, HTN, with L renal mass admitted with acute anemia/ sickle cell crisis and evaluation of his left renal mass s/p left ureteroscopy and biopsy (3/19/25) with 24 hour post operative course complicated by rapid responses for hypoglycemia c/b cardiac arrest x 4 minutes CPR/1 epi with ROSC, transferred to MICU for further care     Found to be profoundly anemic, acidemic, with multiorgan dysfunction overall concerning for acute hemorrhagic shock    N: Sedation  - Prop 50  - RASS -3 to -4   - Last dose of A/C 3/18/25  CV: Shock  PEA arrest- suspect hemorrhage, POCUS with collapsible IVC, hyperdynamic LV  - Joseph 2 and Levo 1  - MAP >65  - Resuscitation: 1:1:1 RBC:Plt:FFP  - Coags, trend CBC closely   - Place 9 french introducer catheter  - Imaging once stable  - Call urology in light of recent procedure  - Dry CT for ?bleed  - Formal TTE  P: Acute hypoxemic respiratory failure iso cardiac arrest  CT chest 3/15 with RUL nodule, mucus plugging, atalectasis  History of DVT, off A/C x 24 hours  - Lung protective ventilation  - Adjust based on BG  - SBT as able  R: MATA iso arrest, hypovolemic shock   Renal mass s/p ureteroscopy and biopsy with urology  Hyperkalemia  Profound acidemia  - Dave  - Call urology   - Trend Cr, uop, lytes  - Calcium and bicarb  - Bicarb drip  GI:   - OG access  Heme: SSD  Pain crisis   Acute blood loss anemia  - Resus with 1:1:1 for hemorrhagic shock  - Off all A/C (last dose 3/18, no need to reverse)  - Coags stat  - Trend hemolysis labs  - Trend CBC  - IVF  Endo: Hypoglycemia  - D5 with bicarb  - Monitor BG q 1 hour for now  - Avoid hypoglycemia  ID:   - Empiric antibiotics: Aztreonam and Flagyl, Vanc for GPC in light of recent procedure; overall low suspicion that this is profound sepsis and septic shock  - Pan culture, will follow       DVT: SCD  Full Code  Updated family at bedside  110 minutes critical care time spent

## 2025-03-20 NOTE — CONSULT NOTE ADULT - SUBJECTIVE AND OBJECTIVE BOX
Vascular Surgery Consult  Consulting attending: Dr. Gomes    HPI:  44M hx sickle cell disease, acute chest syndrome, iron overload, gout, HTN, RUE DVT 12/2024 on Eliquis, and new L renal mass, sent in by his hematologist for transfusion iso anemia (Hb 5.3) prior to scheduled cytoscopy and utereroscopy and bx. Patient found to be in sickle cell crisis, admitted to medicine, transfused, and started on PCA pump. He was started on Lovenox for his prior RUE and L brachial DVTs. Urology consulted for prior planned procedure, now s/p L ureteroscopy, retrograde pyelogram, biopsy, stent placement, L kidney wash, and L kidney lower pole mass biopsy 3/19, cb hemorrhagic shock with H/H drop from 8/22.5 to 3.4/10 s/p tx 6u pRBCs. On 3/20, patient had RRT for hypoglycemia that resolved with d50, however had another RRT for repeated hypoglycemia and AMS, found to be pulseless in PEA arrest w/ ROSC achieved in approximately 4 minutes. Patient was then intubated and transferred to MICU. CTAP was obtained, and pt was found to have left perirenal and subcapsular hemorrhage without foci of active extravasation. IR consulted for renal angio with possible embolization, pt now s/p left renal angiogram with irregularity of left lower pole branch and coil embolization of the left renal artery. Following the procedure, the patient's left upper extremity was noted to be tense and w/ multiple bullae, and vascular surgery was subsequently consulted d/t cf LUE compartment syndrome. Patient noted to have duplex on 3/17 sf chronic, nonocclusive RIJ thrombus with recanalization, as well as non-occlusive, catheter-associated deep vein thrombosis is noted within the left brachial vein at distal upper arm. This finding was first noted on 12/31/24, when the pt had localized LUE swelling.           PAST MEDICAL HISTORY:  Sickle cell anemia    Gout    Anemia requiring transfusions    Marijuana abuse    Pneumonia    Deep vein thrombosis (DVT)    Iron overload    Hypertension        PAST SURGICAL HISTORY:  History of cholecystectomy    History of removal of Port-a-Cath        MEDICATIONS:  allopurinol 100 milliGRAM(s) Oral daily  aluminum hydroxide/magnesium hydroxide/simethicone Suspension 30 milliLiter(s) Oral every 4 hours PRN  aztreonam  IVPB 2000 milliGRAM(s) IV Intermittent <User Schedule>  bisacodyl 5 milliGRAM(s) Oral at bedtime  chlorhexidine 0.12% Liquid 15 milliLiter(s) Oral Mucosa every 12 hours  chlorhexidine 2% Cloths 1 Application(s) Topical daily  deferasirox Tablet 720 milliGRAM(s) Oral daily  dextrose 5% 1000 milliLiter(s) IV Continuous <Continuous>  fentaNYL   Infusion. 0.5 MICROgram(s)/kG/Hr IV Continuous <Continuous>  folic acid 1 milliGRAM(s) Oral daily  influenza   Vaccine 0.5 milliLiter(s) IntraMuscular once  metroNIDAZOLE  IVPB 500 milliGRAM(s) IV Intermittent every 8 hours  norepinephrine Infusion 1 MICROgram(s)/kG/Min IV Continuous <Continuous>  oxybutynin 5 milliGRAM(s) Oral every 8 hours  polyethylene glycol 3350 17 Gram(s) Oral daily  propofol Infusion 20 MICROgram(s)/kG/Min IV Continuous <Continuous>  tamsulosin 0.4 milliGRAM(s) Oral at bedtime      ALLERGIES:  penicillin (Pruritus)  hydroxyurea (Other)  piperacillin-tazobactam (Urticaria)  ceftriaxone (Anaphylaxis)      VITALS & I/Os:  Vital Signs Last 24 Hrs  T(C): 37.1 (20 Mar 2025 21:36), Max: 37.4 (20 Mar 2025 18:00)  T(F): 98.8 (20 Mar 2025 21:36), Max: 99.3 (20 Mar 2025 18:00)  HR: 107 (20 Mar 2025 22:00) (98 - 142)  BP: 101/56 (20 Mar 2025 12:00) (91/50 - 121/61)  BP(mean): 67 (20 Mar 2025 12:00) (61 - 98)  RR: 20 (20 Mar 2025 22:00) (17 - 23)  SpO2: 95% (20 Mar 2025 22:00) (93% - 100%)    Parameters below as of 20 Mar 2025 16:00  Patient On (Oxygen Delivery Method): ventilator    O2 Concentration (%): 60  Mode: AC/ CMV (Assist Control/ Continuous Mandatory Ventilation)  RR (machine): 18  TV (machine): 450  FiO2: 60  PEEP: 6  ITime: 0.63  MAP: 11  PIP: 19    I&O's Summary    19 Mar 2025 07:01  -  20 Mar 2025 07:00  --------------------------------------------------------  IN: 1574 mL / OUT: 0 mL / NET: 1574 mL    20 Mar 2025 07:01  -  20 Mar 2025 22:51  --------------------------------------------------------  IN: 5076.8 mL / OUT: 405 mL / NET: 4671.8 mL        PHYSICAL EXAM:  General: Not acutely distressed  Respiratory: Nonlabored respirations  Abdominal: No palpable thrill  Extremities: Warm  Vascular:     LABS:                        6.7    34.74 )-----------( 216      ( 20 Mar 2025 15:20 )             19.4     03-20    141  |  100  |  52[H]  ----------------------------<  225[H]  4.8   |  13[L]  |  2.26[H]    Ca    9.2      20 Mar 2025 15:20  Phos  7.4     03-20  Mg     1.50     03-20    TPro  4.9[L]  /  Alb  2.7[L]  /  TBili  12.3[H]  /  DBili  x   /  AST  580[H]  /  ALT  209[H]  /  AlkPhos  103  03-20    Lactate:  03-20 @ 10:04  >17.0    PT/INR - ( 20 Mar 2025 15:20 )   PT: 21.4 sec;   INR: 1.86 ratio         PTT - ( 20 Mar 2025 15:20 )  PTT:34.8 sec  ABG - ( 20 Mar 2025 20:01 )  pH, Arterial: 7.26  pH, Blood: x     /  pCO2: 52    /  pO2: 158   / HCO3: 23    / Base Excess: -3.8  /  SaO2: 99.8              CARDIAC MARKERS ( 20 Mar 2025 10:04 )  x     / x     / x     / x     / 1.1 ng/mL        Urinalysis Basic - ( 20 Mar 2025 15:20 )    Color: x / Appearance: x / SG: x / pH: x  Gluc: 225 mg/dL / Ketone: x  / Bili: x / Urobili: x   Blood: x / Protein: x / Nitrite: x   Leuk Esterase: x / RBC: x / WBC x   Sq Epi: x / Non Sq Epi: x / Bacteria: x        IMAGING:                                                                                               Vascular Surgery Consult  Consulting attending: Dr. Gomes    HPI:  44M hx sickle cell disease, acute chest syndrome, iron overload, gout, HTN, RUE DVT 12/2024 on Eliquis, and new L renal mass, sent in by his hematologist for transfusion iso anemia (Hb 5.3) prior to scheduled cytoscopy and utereroscopy and bx. Patient found to be in sickle cell crisis, admitted to medicine, transfused, and started on PCA pump. He was started on Lovenox for his prior RUE and L brachial DVTs. Urology consulted for prior planned procedure, now s/p L ureteroscopy, retrograde pyelogram, biopsy, stent placement, L kidney wash, and L kidney lower pole mass biopsy 3/19, cb hemorrhagic shock with H/H drop from 8/22.5 to 3.4/10 s/p tx 6u pRBCs. On 3/20, patient had RRT for hypoglycemia that resolved with d50, however had another RRT for repeated hypoglycemia and AMS, found to be pulseless in PEA arrest w/ ROSC achieved in approximately 4 minutes. Patient was then intubated and transferred to MICU. CTAP was obtained, and pt was found to have left perirenal and subcapsular hemorrhage without foci of active extravasation. IR consulted for renal angio with possible embolization, pt now s/p left renal angiogram with irregularity of left lower pole branch and coil embolization of the left renal artery. Following the procedure, the patient's left upper extremity was noted to be tense and w/ multiple bullae, and vascular surgery was subsequently consulted d/t cf LUE phlegmasia. Patient noted to have duplex on 3/17 sf chronic, nonocclusive RIJ thrombus with recanalization, as well as non-occlusive, catheter-associated deep vein thrombosis is noted within the left brachial vein at distal upper arm. This finding was first noted on 12/31/24, when the pt had localized LUE swelling.     PAST MEDICAL HISTORY:  Sickle cell anemia    Gout    Anemia requiring transfusions    Marijuana abuse    Pneumonia    Deep vein thrombosis (DVT)    Iron overload    Hypertension        PAST SURGICAL HISTORY:  History of cholecystectomy    History of removal of Port-a-Cath        MEDICATIONS:  allopurinol 100 milliGRAM(s) Oral daily  aluminum hydroxide/magnesium hydroxide/simethicone Suspension 30 milliLiter(s) Oral every 4 hours PRN  aztreonam  IVPB 2000 milliGRAM(s) IV Intermittent <User Schedule>  bisacodyl 5 milliGRAM(s) Oral at bedtime  chlorhexidine 0.12% Liquid 15 milliLiter(s) Oral Mucosa every 12 hours  chlorhexidine 2% Cloths 1 Application(s) Topical daily  deferasirox Tablet 720 milliGRAM(s) Oral daily  dextrose 5% 1000 milliLiter(s) IV Continuous <Continuous>  fentaNYL   Infusion. 0.5 MICROgram(s)/kG/Hr IV Continuous <Continuous>  folic acid 1 milliGRAM(s) Oral daily  influenza   Vaccine 0.5 milliLiter(s) IntraMuscular once  metroNIDAZOLE  IVPB 500 milliGRAM(s) IV Intermittent every 8 hours  norepinephrine Infusion 1 MICROgram(s)/kG/Min IV Continuous <Continuous>  oxybutynin 5 milliGRAM(s) Oral every 8 hours  polyethylene glycol 3350 17 Gram(s) Oral daily  propofol Infusion 20 MICROgram(s)/kG/Min IV Continuous <Continuous>  tamsulosin 0.4 milliGRAM(s) Oral at bedtime      ALLERGIES:  penicillin (Pruritus)  hydroxyurea (Other)  piperacillin-tazobactam (Urticaria)  ceftriaxone (Anaphylaxis)      VITALS & I/Os:  Vital Signs Last 24 Hrs  T(C): 37.1 (20 Mar 2025 21:36), Max: 37.4 (20 Mar 2025 18:00)  T(F): 98.8 (20 Mar 2025 21:36), Max: 99.3 (20 Mar 2025 18:00)  HR: 107 (20 Mar 2025 22:00) (98 - 142)  BP: 101/56 (20 Mar 2025 12:00) (91/50 - 121/61)  BP(mean): 67 (20 Mar 2025 12:00) (61 - 98)  RR: 20 (20 Mar 2025 22:00) (17 - 23)  SpO2: 95% (20 Mar 2025 22:00) (93% - 100%)    Parameters below as of 20 Mar 2025 16:00  Patient On (Oxygen Delivery Method): ventilator    O2 Concentration (%): 60  Mode: AC/ CMV (Assist Control/ Continuous Mandatory Ventilation)  RR (machine): 18  TV (machine): 450  FiO2: 60  PEEP: 6  ITime: 0.63  MAP: 11  PIP: 19    I&O's Summary    19 Mar 2025 07:01  -  20 Mar 2025 07:00  --------------------------------------------------------  IN: 1574 mL / OUT: 0 mL / NET: 1574 mL    20 Mar 2025 07:01  -  20 Mar 2025 22:51  --------------------------------------------------------  IN: 5076.8 mL / OUT: 405 mL / NET: 4671.8 mL        PHYSICAL EXAM:  General: Not acutely distressed  Neurological: sedated  Respiratory: intubated  Abdominal: No palpable thrill  Extremities: Warm, LUE swollen compared to right, scattered clear vesicles along anterior left forearm, compartments soft with appropriate capillary refill, no skin discoloration  Vascular: DP palpable bilaterally, PT dopplerable bilaterally, left radial/ulnar very weakly palpable, however dopplerable along with left palmar arch    LABS:                        6.7    34.74 )-----------( 216      ( 20 Mar 2025 15:20 )             19.4     03-20    141  |  100  |  52[H]  ----------------------------<  225[H]  4.8   |  13[L]  |  2.26[H]    Ca    9.2      20 Mar 2025 15:20  Phos  7.4     03-20  Mg     1.50     03-20    TPro  4.9[L]  /  Alb  2.7[L]  /  TBili  12.3[H]  /  DBili  x   /  AST  580[H]  /  ALT  209[H]  /  AlkPhos  103  03-20    Lactate:  03-20 @ 10:04  >17.0    PT/INR - ( 20 Mar 2025 15:20 )   PT: 21.4 sec;   INR: 1.86 ratio         PTT - ( 20 Mar 2025 15:20 )  PTT:34.8 sec  ABG - ( 20 Mar 2025 20:01 )  pH, Arterial: 7.26  pH, Blood: x     /  pCO2: 52    /  pO2: 158   / HCO3: 23    / Base Excess: -3.8  /  SaO2: 99.8              CARDIAC MARKERS ( 20 Mar 2025 10:04 )  x     / x     / x     / x     / 1.1 ng/mL        Urinalysis Basic - ( 20 Mar 2025 15:20 )    Color: x / Appearance: x / SG: x / pH: x  Gluc: 225 mg/dL / Ketone: x  / Bili: x / Urobili: x   Blood: x / Protein: x / Nitrite: x   Leuk Esterase: x / RBC: x / WBC x   Sq Epi: x / Non Sq Epi: x / Bacteria: x        IMAGING:

## 2025-03-20 NOTE — DIETITIAN INITIAL EVALUATION ADULT - PERTINENT MEDS FT
MEDICATIONS  (STANDING):  allopurinol 100 milliGRAM(s) Oral daily  aztreonam  IVPB 2000 milliGRAM(s) IV Intermittent <User Schedule>  bisacodyl 5 milliGRAM(s) Oral at bedtime  chlorhexidine 0.12% Liquid 15 milliLiter(s) Oral Mucosa every 12 hours  chlorhexidine 2% Cloths 1 Application(s) Topical daily  deferasirox Tablet 720 milliGRAM(s) Oral daily  dextrose 5% 1000 milliLiter(s) (100 mL/Hr) IV Continuous <Continuous>  dextrose 50% Injectable 12.5 Gram(s) IV Push once  fentaNYL   Infusion. 0.5 MICROgram(s)/kG/Hr (4.44 mL/Hr) IV Continuous <Continuous>  folic acid 1 milliGRAM(s) Oral daily  influenza   Vaccine 0.5 milliLiter(s) IntraMuscular once  metroNIDAZOLE  IVPB 500 milliGRAM(s) IV Intermittent every 8 hours  norepinephrine Infusion 1 MICROgram(s)/kG/Min (167 mL/Hr) IV Continuous <Continuous>  oxybutynin 5 milliGRAM(s) Oral every 8 hours  phenylephrine    Infusion 1 MICROgram(s)/kG/Min (33.3 mL/Hr) IV Continuous <Continuous>  polyethylene glycol 3350 17 Gram(s) Oral daily  propofol Infusion 20 MICROgram(s)/kG/Min (10.7 mL/Hr) IV Continuous <Continuous>  sodium zirconium cyclosilicate 10 Gram(s) Oral once  tamsulosin 0.4 milliGRAM(s) Oral at bedtime    MEDICATIONS  (PRN):  aluminum hydroxide/magnesium hydroxide/simethicone Suspension 30 milliLiter(s) Oral every 4 hours PRN Dyspepsia

## 2025-03-20 NOTE — CHART NOTE - NSCHARTNOTEFT_GEN_A_CORE
:  LeJeune/Weinstein    INDICATION:  shock    PROCEDURE:  [x ] LIMITED ECHO  [x ] LIMITED CHEST  [ ] LIMITED RETROPERITONEAL  [x ] LIMITED ABDOMINAL  [ ] LIMITED DVT  [ ] NEEDLE GUIDANCE VASCULAR  [ ] NEEDLE GUIDANCE THORACENTESIS  [ ] NEEDLE GUIDANCE PARACENTESIS  [ ] NEEDLE GUIDANCE PERICARDIOCENTESIS  [ ] OTHER    FINDINGS:  hyperdynamic LV. LV>RV. No significant valvular regurgitation. Collapsed IVC.  a line predominant lung pattern with lung sliding throughout  No free fluid seen on FAST exam    INTERPRETATION:  Consistent with hypovolemic shock    Images uploaded to [employer]    Time spent on the procedure was separate from the critical care time spent for patient care. :  LeJeune/Weinstein    INDICATION:  shock    PROCEDURE:  [x ] LIMITED ECHO  [x ] LIMITED CHEST  [ ] LIMITED RETROPERITONEAL  [x ] LIMITED ABDOMINAL  [ ] LIMITED DVT  [ ] NEEDLE GUIDANCE VASCULAR  [ ] NEEDLE GUIDANCE THORACENTESIS  [ ] NEEDLE GUIDANCE PARACENTESIS  [ ] NEEDLE GUIDANCE PERICARDIOCENTESIS  [ ] OTHER    FINDINGS:  hyperdynamic LV. LV>RV. No significant valvular regurgitation. Collapsed IVC.  a line predominant lung pattern with lung sliding throughout  small amount of free fluid in hepatorenal recess.    INTERPRETATION:  Consistent with hypovolemic shock    Images uploaded to [employer]    Time spent on the procedure was separate from the critical care time spent for patient care.

## 2025-03-20 NOTE — CHART NOTE - NSCHARTNOTEFT_GEN_A_CORE
At approximately 9AM, mRRT was called due to AMS. Pt was found to be hypoglycemic to 50, D50 was given during the rapid. Pt was protecting his airway and while uncomfortable was VSS. At approximately 9:30AM a code blue was called when pt was found unresponsive and chest compressions started. ROSC was achieved approximately 4 minutes of compressions. Pt was then intubated at bedside and sinus rhythm was seen on telemetry. MICU was called and accepted the pt and transferred to MICU. Family member, brother listed as emergency contact was informed of events and was emotionally distraught but expressed understanding of his brother's condition. Med attending also aware of events. For furhter details of RRT please refer to official RRT report.     Marcel Young. MICAH At approximately 9AM, mRRT was called due to AMS. Pt was found to be hypoglycemic to 50, D50 was given during the rapid. Pt was protecting his airway and while uncomfortable was VSS. At approximately 9:30AM a code blue was called when pt was found unresponsive and chest compressions started. ROSC was achieved approximately 4 minutes of compressions. Pt was then intubated at bedside and sinus rhythm was seen on telemetry. MICU was called and accepted the pt and transferred to MICU. Family member, brother listed as emergency contact was informed of events and was emotionally distraught but expressed understanding of his brother's condition. Med attending also aware of events. For additional details of RRT please refer to official RRT report.     Marcel Young. MICAH

## 2025-03-20 NOTE — DISCHARGE NOTE PROVIDER - CARE PROVIDER_API CALL
Nikolai Maxwell  Internal Medicine  [address] [place] [address], [address]  [place] [address]  Phone: [phone]  Fax: [phone]  Follow Up Time:     [facility],   [address] [place] [address]  Phone: [phone]  Fax: (   )    -  Established Patient  Follow Up Time:    Nikolai Maxwell  Internal Medicine  [address] [place] [address], [address]  [place] [address]  Phone: [phone]  Fax: [phone]  Follow Up Time:     [facility],   [address] [place], [place] [address] [place] [address]  Phone: [phone]  Fax: (   )    -  Established Patient  Follow Up Time:     Yan Currie  Urology  [address] [facility], [address]  [place] [address]  Phone: [phone]  Fax: [phone]  Follow Up Time: 2 weeks

## 2025-03-20 NOTE — PROGRESS NOTE ADULT - ASSESSMENT
44y M s/p  L ureteroscopy, RGP, biopsy, stent placement L kidney wash, L kidney lower pole mass biopsy    Recommendations:  - Pain control PRN  - Restart Eliquis Friday  - F/u Dr. Currie outpatient for stent removal    [facility]  [address] [place] [id number]  [phone]

## 2025-03-20 NOTE — DISCHARGE NOTE PROVIDER - PROVIDER TOKENS
PROVIDER:[TOKEN:[[id number]]],FREE:[LAST:[[facility]],PHONE:[phone]],FAX:[(   )    -],ADDRESS:[[address] [place] [address]],ESTABLISHEDPATIENT:[T]] PROVIDER:[TOKEN:[[id number]]],FREE:[LAST:[[facility]],PHONE:[phone]],FAX:[(   )    -],ADDRESS:[[address] [place] [address]],ESTABLISHEDPATIENT:[T]],PROVIDER:[TOKEN:[[id number]],FOLLOWUP:[2 weeks]]

## 2025-03-20 NOTE — DISCHARGE NOTE PROVIDER - NSDCMRMEDTOKEN_GEN_ALL_CORE_FT
allopurinol 100 mg oral tablet: 1 tab(s) orally once a day  Eliquis 5 mg oral tablet: 1 tab(s) orally 2 times a day  folic acid 1 mg oral tablet: 1 tab(s) orally every 24 hours  Jadenu 360 mg oral tablet: 2 tab(s) orally once a day  NIFEdipine 30 mg oral tablet, extended release: 1 tab(s) orally once a day  oxyCODONE 30 mg oral tablet: 1 tab(s) orally every 4 hours as needed for  severe pain   acetaminophen 160 mg/5 mL oral suspension: 20.31 milliliter(s) by gastrostomy tube every 6 hours as needed for Temp greater or equal to 38C (100.4F), Mild Pain (1 - 3)  amLODIPine 5 mg oral tablet: 1 tab(s) by gastrostomy tube once a day  cinacalcet 30 mg oral tablet: 30 milligram(s) by gastrostomy tube every 12 hours  clonazePAM 2 mg oral tablet: 1 tab(s) by gastrostomy tube every 8 hours  folic acid 1 mg oral tablet: 1 tab(s) by gastrostomy tube every 24 hours  ipratropium-albuterol 0.5 mg-2.5 mg/3 mL inhalation solution: 3 milliliter(s) inhaled every 6 hours  levETIRAcetam 100 mg/mL oral solution: 5 milliliter(s) by gastrostomy tube 2 times a day  pantoprazole 40 mg oral granule, delayed release: 40 milligram(s) by gastrostomy tube once a day  saliva substitutes oral solution: 15 milliliter(s) orally every 8 hours Swish and spit or apply with sponge for dry mouth/oral care.   acetaminophen 160 mg/5 mL oral suspension: 20.31 milliliter(s) by gastrostomy tube every 6 hours as needed for Temp greater or equal to 38C (100.4F), Mild Pain (1 - 3)  amLODIPine 5 mg oral tablet: 1 tab(s) by gastrostomy tube once a day  cinacalcet 30 mg oral tablet: 30 milligram(s) by gastrostomy tube every 12 hours  clonazePAM 2 mg oral tablet: 1 tab(s) by gastrostomy tube every 8 hours  folic acid 1 mg oral tablet: 1 tab(s) by gastrostomy tube every 24 hours  ipratropium-albuterol 0.5 mg-2.5 mg/3 mL inhalation solution: 3 milliliter(s) inhaled every 6 hours  levETIRAcetam 100 mg/mL oral solution: 5 milliliter(s) by gastrostomy tube 2 times a day  pantoprazole 40 mg oral granule, delayed release: 40 milligram(s) by gastrostomy tube once a day  Peridex 0.12% mucous membrane liquid: 15 milliliter(s) orally 2 times a day Apply with sponge for oral care/ mouth hygiene  saliva substitutes oral solution: 15 milliliter(s) orally 2 times a day Aapply with sponge for dry mouth/oral care.

## 2025-03-20 NOTE — CHART NOTE - NSCHARTNOTEFT_GEN_A_CORE
Called by MICU attending.  Informed of events including code blue, PEA.  Patient currently in MICU on pressors for BP support.  Labs at the time of code noted to have critical anemia.  Discussed with MICU attending, advised for urgent CT a/p w/o IV contrast.  Prior to event, voided urine had been light pink to clear.  Suspect possible astrid-nephric hemorrhage.  If confirmed on CT, then would recommend IR for left renal angiogram and embolizations.

## 2025-03-20 NOTE — DISCHARGE NOTE PROVIDER - INSTRUCTIONS
Diet: NPO with Tube Feed:   Tube Feeding Modality: Gastrostomy   Nepro with Carb Steady (NEPROTH).   Total Volume for 24 hours (mL): 1170 Bolus   Total Volume of Bolus (mL): 195  Total # of Feeds: 6   Tube Feed Frequency: Every 4 hours     Tube Feed Start Time: 05:00  Bolus Feed Rate (mL per Hour): 195      Bolus Feed Duration (in Hours): 1 (05-07-25 @ 19:13)     Nepro with Carb Steady (NEPROTH) bolus feeds 195cc Q4H x 6 feeds per day

## 2025-03-20 NOTE — PROGRESS NOTE ADULT - SUBJECTIVE AND OBJECTIVE BOX
Patient is a 44y old  Male who presents with a chief complaint of Referred by Hematologist for low Hg (20 Mar 2025 07:27)  Patient seen today, s/p RRT for hypoglycemia, resting in bed, stable now      MEDICATIONS  (STANDING):  allopurinol 100 milliGRAM(s) Oral daily  bisacodyl 5 milliGRAM(s) Oral at bedtime  chlorhexidine 2% Cloths 1 Application(s) Topical daily  deferasirox Tablet 720 milliGRAM(s) Oral daily  dextrose 50% Injectable 12.5 Gram(s) IV Push once  folic acid 1 milliGRAM(s) Oral daily  HYDROmorphone PCA (1 mG/mL) 30 milliLiter(s) PCA Continuous PCA Continuous  influenza   Vaccine 0.5 milliLiter(s) IntraMuscular once  NIFEdipine XL 30 milliGRAM(s) Oral daily  oxybutynin 5 milliGRAM(s) Oral every 8 hours  polyethylene glycol 3350 17 Gram(s) Oral daily  sodium chloride 0.45%. 1000 milliLiter(s) (30 mL/Hr) IV Continuous <Continuous>  tamsulosin 0.4 milliGRAM(s) Oral at bedtime    MEDICATIONS  (PRN):  acetaminophen     Tablet .. 650 milliGRAM(s) Oral every 6 hours PRN Temp greater or equal to 38C (100.4F), Mild Pain (1 - 3)  aluminum hydroxide/magnesium hydroxide/simethicone Suspension 30 milliLiter(s) Oral every 4 hours PRN Dyspepsia  ondansetron Injectable 4 milliGRAM(s) IV Push every 8 hours PRN Nausea and/or Vomiting        Vital Signs Last 24 Hrs  T(C): 36.6 (20 Mar 2025 05:40), Max: 37 (19 Mar 2025 11:05)  T(F): 97.9 (20 Mar 2025 05:40), Max: 98.6 (19 Mar 2025 11:05)  HR: 98 (20 Mar 2025 05:40) (74 - 138)  BP: 121/61 (20 Mar 2025 05:40) (117/63 - 180/100)  BP(mean): 106 (19 Mar 2025 20:45) (94 - 127)  RR: 18 (20 Mar 2025 05:40) (10 - 25)  SpO2: 97% (20 Mar 2025 05:40) (91% - 100%)    Parameters below as of 20 Mar 2025 05:40  Patient On (Oxygen Delivery Method): room air        PE  NAD  resting  Anicteric, MMM  RRR  CTAB  Abd soft, NT, ND  No c/c/e  No rash grossly                            8.0    8.01  )-----------( 249      ( 19 Mar 2025 09:07 )             22.5       03-19    137  |  104  |  27[H]  ----------------------------<  84  5.0   |  24  |  0.85    Ca    9.4      19 Mar 2025 09:07  Mg     1.60     03-19    TPro  7.0  /  Alb  3.8  /  TBili  6.7[H]  /  DBili  x   /  AST  127[H]  /  ALT  34  /  AlkPhos  152[H]  03-19       Patient is a 44y old  Male who presents with a chief complaint of Referred by Hematologist for low Hg (20 Mar 2025 07:27)  Patient seen today, s/p RRT for hypoglycemia and then  at approximately 9:30AM a code blue was called when pt was found unresponsive and chest compressions started.   Glucose at this time noted to be 41. ROSC was achieved approximately 4 minutes of compressions, w/ rhythm noted to be PEA. Pt was then intubated at bedside and sinus rhythm was seen on telemetry  Now upgraded to MICU, Hgb 8.0 ----> 3.4      MEDICATIONS  (STANDING):  allopurinol 100 milliGRAM(s) Oral daily  bisacodyl 5 milliGRAM(s) Oral at bedtime  chlorhexidine 2% Cloths 1 Application(s) Topical daily  deferasirox Tablet 720 milliGRAM(s) Oral daily  dextrose 50% Injectable 12.5 Gram(s) IV Push once  folic acid 1 milliGRAM(s) Oral daily  HYDROmorphone PCA (1 mG/mL) 30 milliLiter(s) PCA Continuous PCA Continuous  influenza   Vaccine 0.5 milliLiter(s) IntraMuscular once  NIFEdipine XL 30 milliGRAM(s) Oral daily  oxybutynin 5 milliGRAM(s) Oral every 8 hours  polyethylene glycol 3350 17 Gram(s) Oral daily  sodium chloride 0.45%. 1000 milliLiter(s) (30 mL/Hr) IV Continuous <Continuous>  tamsulosin 0.4 milliGRAM(s) Oral at bedtime    MEDICATIONS  (PRN):  acetaminophen     Tablet .. 650 milliGRAM(s) Oral every 6 hours PRN Temp greater or equal to 38C (100.4F), Mild Pain (1 - 3)  aluminum hydroxide/magnesium hydroxide/simethicone Suspension 30 milliLiter(s) Oral every 4 hours PRN Dyspepsia  ondansetron Injectable 4 milliGRAM(s) IV Push every 8 hours PRN Nausea and/or Vomiting        Vital Signs Last 24 Hrs  T(C): 36.6 (20 Mar 2025 05:40), Max: 37 (19 Mar 2025 11:05)  T(F): 97.9 (20 Mar 2025 05:40), Max: 98.6 (19 Mar 2025 11:05)  HR: 98 (20 Mar 2025 05:40) (74 - 138)  BP: 121/61 (20 Mar 2025 05:40) (117/63 - 180/100)  BP(mean): 106 (19 Mar 2025 20:45) (94 - 127)  RR: 18 (20 Mar 2025 05:40) (10 - 25)  SpO2: 97% (20 Mar 2025 05:40) (91% - 100%)    Parameters below as of 20 Mar 2025 05:40  Patient On (Oxygen Delivery Method): room air        PE  Intubated  Anicteric, MMM  RRR  CTAB  Abd soft, NT, ND  No c/c/e  No rash grossly                            8.0    8.01  )-----------( 249      ( 19 Mar 2025 09:07 )             22.5       03-19    137  |  104  |  27[H]  ----------------------------<  84  5.0   |  24  |  0.85    Ca    9.4      19 Mar 2025 09:07  Mg     1.60     03-19    TPro  7.0  /  Alb  3.8  /  TBili  6.7[H]  /  DBili  x   /  AST  127[H]  /  ALT  34  /  AlkPhos  152[H]  03-19

## 2025-03-20 NOTE — DISCHARGE NOTE PROVIDER - NSDCFUADDINST_GEN_ALL_CORE_FT
STENT: You may have an internal stent (a hollow tube that runs from the kidney to your bladder) after your procedure, which helps urine drain from the kidney to your bladder. Some patients experience urinary frequency, burning, or even back pain (especially with urination). These sensations will gradually get better. Increasing your fluid intake can also improve these symptoms. While the stent is in place, your urine may continue to be bloody. This stent is temporary and must be removed by your urologist as an outpatient with in 3 months unless otherwise specified.  GENERAL: It is common to have blood in your urine after your procedure. It may be pink or even red; inform your doctor if you have a significant amount of clot in the urine or if you are unable to void at all. The urine may clear and then become bloody again especially as you are more physically active.  BATHING: You may shower or bathe.  DIET: You may resume your regular diet and regular medication regimen.  PAIN: You may take Tylenol (acetaminophen) 650-975mg, available over the counter, for pain every 6 hours as needed. Do not exceed 4000mg of Tylenol (acetaminophen) daily. A prescription for flomax (tamsulosin) at bedtime will be sent to your pharmacy for stent discomfort.  ANTIBIOTICS: You do not need antibiotics.  ACTIVITY: No heavy lifting or strenuous exercise until you are evaluated at your post-operative appointment. Otherwise, you may return to your usual level of physical activity.  ANTICOAGULATION: You can restart your Eliquis on Friday, March 21.  FOLLOW-UP: If you did not already schedule your post-operative appointment, please call your urologist to schedule and follow-up appointment.  CALL YOUR UROLOGIST IF: You have any bleeding that does not stop, inability to void >8 hours, fever over 100.4 F, chills, persistent nausea/vomiting, or if your pain is not controlled on your discharge pain medications.

## 2025-03-20 NOTE — RAPID RESPONSE TEAM SUMMARY - NSSITUATIONBACKGROUNDRRT_GEN_ALL_CORE
44M with PMHx of Sickle cell disease (last transfusion/exchange transfusion 12/2024), prior acute chest syndrome, iron overload, Gout, HTN and RUE DVT 12/2024 on eliquis (on hold since 3/15 for upcoming cystoscopy, ureteroscopy on 3/19) sent in by Hematologist for transfusion/low Hg prior to procedure (Hg of 5.3). Also w/ active sickle cell crisis.    At approximately 9AM, mRRT was called due to AMS. Pt was found to be hypoglycemic to 50, D50 was given during the rapid. Pt was protecting his airway and while uncomfortable was VSS. Intruction provide to recheck sugar in 30 minutes with consideration for more D50 if needed. At approximately 9:30AM a code blue was called when pt was found unresponsive and chest compressions started. Glucose at this time noted to be 41. ROSC was achieved approximately 4 minutes of compressions, w/ rhythm noted to be PEA. Pt was then intubated at bedside and sinus rhythm was seen on telemetry. MICU was called and accepted the pt and transferred to MICU. Family member, brother listed as emergency contact was informed of events and was emotionally distraught but expressed understanding of his brother's condition. Med attending also aware of events.

## 2025-03-20 NOTE — PROGRESS NOTE ADULT - ASSESSMENT
This is a 44 year old male with sickle cell disease who presents with low hemoglobin.  s/p left renal bx with stent placement 3/19  1. Sickle cell anemia  -- Baseline hemoglobin outpatient ~6.0, sent in for hemoglobin 5.3   -- Labs consistent with hemolysis- elevated bili though improving,  LDH 1121 which is downtrending  -- CXR neg. CTA chest w/o PE though RUL opacity. Patient is asymptomatic and saturating well on room air.   -- Continue with folic acid, encourage hydration, optimize pain control on IV PCA   -- Monitor CBC and transfuse to maintain hg >6. s/p 1 unit pRBC, some improvement but would recommend additional 2 units pRBC    2. R IJ DVT   -- Dx 12/2024, currently on therapeutic Lovenox   -- Rec repeat US to evaluate clot, and if neg then RUE may be used for access   -- Pt will continue to need at least 6 months of anticoagulation    3. Back pain   -- Typical of his sickle cell pain   -- Outpatient he is on oxycodone 30mg PO q4h  -- on IV PCA, wean as able     4. Iron overload   -- Cont Jadenu    5. r/o malignancy   -- Prior imaging with concern for urothelial malignancy   -- s/p  L ureteroscopy, RGP, biopsy, stent placement L kidney wash, L kidney lower pole mass biopsy 3/19, will follow pathology  -- Urology following, plan cystoscopy with ureteroscopy and biopsy tomorrow, will follow up pathology     6. RRT  --s/p rapid response for hypoglycemia this AM  --stable and resting comfortably      Will continue to follow.    Meghana Real NP  Hematology/Oncology  [facility]  [phone] (Office)  [phone] (Alt office)  Evenings and weekends please call MD on call or office     This is a 44 year old male with sickle cell disease who presents with low hemoglobin.  s/p left renal bx with stent placement 3/19  1. Sickle cell anemia  -- Baseline hemoglobin outpatient ~6.0, sent in for hemoglobin 5.3   -- Labs consistent with hemolysis- elevated bili though improving,  LDH 1121 which is downtrending  -- CXR neg. CTA chest w/o PE though RUL opacity. Patient is asymptomatic and saturating well on room air.   -- Continue with folic acid, encourage hydration, optimize pain control on IV PCA   -- Monitor CBC and transfuse to maintain hg >6. s/p 1 unit pRBC, some improvement but would recommend additional 2 units pRBC    2. R IJ DVT   -- Dx 12/2024, currently on therapeutic Lovenox , now holding  -- Rec repeat US to evaluate clot, and if neg then RUE may be used for access   -- Pt will continue to need at least 6 months of anticoagulation    3. Back pain   -- Typical of his sickle cell pain   -- Outpatient he is on oxycodone 30mg PO q4h  -- on IV PCA, wean as able     4. Iron overload   -- Cont Jadenu    5. r/o malignancy   -- Prior imaging with concern for urothelial malignancy   -- s/p  L ureteroscopy, RGP, biopsy, stent placement L kidney wash, L kidney lower pole mass biopsy 3/19, will follow pathology  -- Urology following, plan cystoscopy with ureteroscopy and biopsy tomorrow, will follow up pathology     6. RRT  --s/p rapid response for hypoglycemia this AM, and then  at approximately 9:30AM a code blue was called when pt was found unresponsive and chest compressions started. Glucose at this time noted to be 41. ROSC was achieved approximately 4 minutes of compressions, w/ rhythm noted to be PEA. Pt was then intubated at bedside and sinus rhythm was seen on telemetry  -Hgb drop from 8.0--->3.4  -Intubated in  MICU        Will continue to follow.    Meghana Real NP  Hematology/Oncology  [facility]  [phone] (Office)  [phone] (Alt office)  Evenings and weekends please call MD on call or office     This is a 44 year old male with sickle cell disease who presents with low hemoglobin.  s/p left renal bx with stent placement 3/19  1. Sickle cell anemia  -- Baseline hemoglobin outpatient ~6.0, sent in for hemoglobin 5.3   -- Labs consistent with hemolysis- elevated bili though improving,  LDH 1121 which is downtrending  -- CXR neg. CTA chest w/o PE though RUL opacity. Patient is asymptomatic and saturating well on room air.   -- Continue with folic acid, encourage hydration, optimize pain control on IV PCA   -- Monitor CBC and transfuse to maintain hg >6. s/p 1 unit pRBC, some improvement but would recommend additional 2 units pRBC    2. R IJ DVT   -- Dx 12/2024, currently on therapeutic Lovenox , now holding  -- Rec repeat US to evaluate clot, and if neg then RUE may be used for access   -- Pt will continue to need at least 6 months of anticoagulation    3. Back pain   -- Typical of his sickle cell pain   -- Outpatient he is on oxycodone 30mg PO q4h  -- on IV PCA, wean as able     4. Iron overload   -- Cont Jadenu    5. r/o malignancy   -- Prior imaging with concern for urothelial malignancy   -- s/p  L ureteroscopy, RGP, biopsy, stent placement L kidney wash, L kidney lower pole mass biopsy 3/19, will follow pathology      6. RRT  --s/p rapid response for hypoglycemia this AM, and then  at approximately 9:30AM a code blue was called when pt was found unresponsive and chest compressions started. Glucose at this time noted to be 41. ROSC was achieved approximately 4 minutes of compressions, w/ rhythm noted to be PEA. Pt was then intubated at bedside and sinus rhythm was seen on telemetry  -Hgb drop from 8.0--->3.4  -Intubated in  MICU        Will continue to follow.    Meghana Real NP  Hematology/Oncology  [facility]  [phone] (Office)  [phone] (Alt office)  Evenings and weekends please call MD on call or office

## 2025-03-20 NOTE — AIRWAY PLACEMENT NOTE ADULT - AIRWAY COMMENTS:
code blue called, arrived at bedside with ongoing chest compressions. airway requested by primary team. #7.0 oral cuffed placed easily with bilateral chest rise and +etco2. ETT secured and connected to ventilator. Handed off to RT x 2 at bedside.

## 2025-03-20 NOTE — DISCHARGE NOTE PROVIDER - HOSPITAL COURSE
43 YO M with PMHx of sickle cell disease with prior acute chest syndrome requiring transfusion and exchange in the past (last 12/2024), iron overload, gout, HTN, and RUE DVT in 12/2024 on eliquis who presented on 3/15 by his hematologist second to anemia, down to 5.3 from baseline 7-8, with concern for vaso-occlusive crisis. Patient also found with hypoxia likely second to crisis and anemia in ER requiring 4L NC and no signs of acute chest noted as denied chest pain and SOB. Patient given tylenol IV, benadryl 50mg IV, dilaudid 2mg IV x3, 1U PRBC and started on PCA pump on admission and admitted to medicine for further management.     While on medicine, anemia improved post transfusion. LVX started in lieu of home eliquis. Patient noted with left renal mass during prior admission and followed with urology outpatient. Patient noted with plans for cystourethroscopy with biopsy on outpatient and home eliquis held since 3/14. 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 Given outpatient plans urology consulted, AC held, and s/p L ureteroscopy with biopsy and stent placement on 3/19. Course complicated with hypoglycemia on 3/20 with RRT x 2 and found with severe anemia to 3.4 and ultimately pulseless. CODE BLUE called for PEA arreat and ROSC achieved in 4 minutes. Patient was intubated and transferred to MICU.     While in MICU, patient noted with CT AP showing left perirenal and subcapsular hemorrhage without foci of active extravasation. IR consulted for renal angio with possible embolization. Patient underwent left renal angiogram and embolization with IR on 3/20. Post arrest CTH and MRI BRAIN performed with concerns for anoxic brain injury. Course further complicated MATA requiring HD via L SHILEY, VAP, myoclonic jerks vs seizures and started on keppra, GIB s/p EGD with gastric and duodenal ulcers requiring cauterization and hemostatic spray on 4/11, and prolonged vent time and s/p tracheostomy with 7 PORTEX on 4/4. Course further complicated by oropharyngeal dysphagia with vent dependence and case discussed with IR and GI for PEG, however no safe window noted. Course further complicated by renal bx positive for non-invasive papillary urothelial carcinoma. Case discussed with hemeONC and given anoxic brain injury and vent dependence patient not a candidate for DMT. Case discussed with family and palliative and remains full code. Patient ultimately transferred to RCU on 4/18.     45 YO M with PMHx of sickle cell disease with prior acute chest syndrome requiring transfusion and exchange in the past (last 12/2024), iron overload, gout, HTN, and RUE DVT in 12/2024 on eliquis who presented on 3/15 by his hematologist second to anemia, down to 5.3 from baseline 7-8, with concern for vaso-occlusive crisis. Patient also found with hypoxia likely second to crisis and anemia in ER requiring 4L NC and no signs of acute chest noted as denied chest pain and SOB. Patient given tylenol IV, benadryl 50mg IV, dilaudid 2mg IV x3, 1U PRBC and started on PCA pump on admission and admitted to medicine for further management.     While on medicine, anemia improved post transfusion. LVX started in lieu of home eliquis. Patient noted with left renal mass during prior admission and followed with urology outpatient. Patient noted with plans for cystourethroscopy with biopsy on outpatient and home eliquis held since 3/14. 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 Given outpatient plans urology consulted, AC held, and s/p L ureteroscopy with biopsy and stent placement on 3/19. Course complicated with hypoglycemia on 3/20 with RRT x 2 and found with severe anemia to 3.4 and ultimately pulseless. CODE BLUE called for PEA arrest and ROSC achieved in 4 minutes. Patient was intubated and transferred to MICU.     While in MICU, patient noted with CT AP showing left perirenal and subcapsular hemorrhage without foci of active extravasation. IR consulted for renal angio with possible embolization. Patient underwent left renal angiogram and embolization with IR on 3/20. Post arrest CTH and MRI BRAIN performed with concerns for anoxic brain injury. Course further complicated MATA requiring HD via L SHILEY, VAP, myoclonic jerks vs seizures and started on keppra, GIB s/p EGD with gastric and duodenal ulcers requiring cauterization and hemostatic spray on 4/11, and prolonged vent time and s/p tracheostomy with 7 PORTEX on 4/4. Course further complicated by oropharyngeal dysphagia with vent dependence and case discussed with IR and GI for PEG, however no safe window noted. Course further complicated by renal bx positive for non-invasive papillary urothelial carcinoma. Case discussed with hemeONC and given anoxic brain injury and vent dependence patient not a candidate for DMT. Case discussed with family and palliative and remains full code. Patient ultimately transferred to RCU on 4/18.     45 YO M with PMHx of sickle cell disease with prior acute chest syndrome requiring transfusion and exchange in the past (last 12/2024), iron overload, gout, HTN, and RUE DVT in 12/2024 on eliquis who presented on 3/15 by his hematologist second to anemia and hypoxia concerning for vaso-occlusive crisis. Initially admitted to Medicine and anemia improved post transfusion and continued on SCC pain control PCA. Patient noted with left renal mass during prior admission and underwent L ureteroscopy with biopsy and stent placement on 3/19 with postprocedure course c/b hemorrhagic shock 2/2 left perirenal and subcapsular hemorrhage ultimately progressed to PEA arrest. ROSC achieved and transferred to MICU. Patient s/p IR emobilization with course c/b anoxic brain injury, myoclonic jerks, GIB s/p EGD 4/11 with cauterization of gastric ulcer & hemostatic spraying of three duodenal ulcers,  MATA likely d/t shock requiring brief period of temporary HD now normalized and weaned off HD, HAGMA, DVTs (R IJ and LUE brachial, L gastrocnemius), dysphagia s/p G tube, cirrhosis, with prolonged vent time requiring tracheostomy creation on 4/4. Eventually transferred to the RCU on 4/18. Renal biopsy positive for urothelial carcinoma. His course was c/b persistent fevers despite completing several courses of antibiotics for multiple infections including PNA and UTI and thus fevers suspected to be central in nature. Attempted bromocriptine however c/b transaminitis so now discontinued. During his course he was found with tongue occlusive trauma requiring bite block placement by Dental and creation of mouth guard. Dysautonomic symptoms managed with PRN dilaudid. Failed multiple TOV attempts, Dave catheter remains in place. Patient also found with several DVTs as previously mentioned however given large bleed leading to cardiac arrest high risk for anticoagulation and after discussion with IR decision made to defer IVC filter. Hypernatremia managed with intermittent D5 and FWF. Hypercalcemia likely hyperparathyroid induced vs immobility/dehydration managed with IVF, calcitonin, sensipar. 45 YO M with PMHx of sickle cell disease with prior acute chest syndrome requiring transfusion and exchange in the past (last 12/2024), iron overload, gout, HTN, and RUE DVT in 12/2024 on eliquis who presented on 3/15 by his hematologist second to anemia and hypoxia concerning for vaso-occlusive crisis. Initially admitted to Medicine and anemia improved post transfusion and continued on SCC pain control PCA. Patient noted with left renal mass during prior admission and underwent L ureteroscopy with biopsy and stent placement on 3/19 with postprocedure course c/b hemorrhagic shock 2/2 left perirenal and subcapsular hemorrhage ultimately progressed to PEA arrest. ROSC achieved and transferred to MICU. Patient s/p IR emobilization with course c/b anoxic brain injury, myoclonic jerks, GIB s/p EGD 4/11 with cauterization of gastric ulcer & hemostatic spraying of three duodenal ulcers,  MATA likely d/t shock requiring brief period of temporary HD now normalized and weaned off HD, HAGMA, DVTs (R IJ and LUE brachial, L gastrocnemius), dysphagia s/p G tube, cirrhosis, with prolonged vent time requiring tracheostomy creation on 4/4. Eventually transferred to the RCU on 4/18. Renal biopsy positive for urothelial carcinoma. His course was c/b persistent fevers despite completing several courses of antibiotics for multiple infections including PNA and UTI and thus fevers suspected to be central in nature. Attempted bromocriptine however c/b transaminitis so now discontinued with improvement in transaminitis. known left kidney bleed vs known DVTs and overall curve improved. During his course he was found with tongue occlusive trauma requiring bite block placement by Dental and creation of mouth guard. Dysautonomic symptoms managed with klonopin. Failed multiple TOV attempts and daniel catheter remains in place. Patient also found with several DVTs as previously mentioned however given large bleed leading to cardiac arrest high risk for anticoagulation and after discussion with IR decision made to defer IVC filter. With fever spikes noted with Hypernatremia and started with FWF with improvement. Hypercalcemia noted likely second to hyperparathyroidism vs immobility/ vs dehydration, managed with IVF, given calcitonin, and started sensipar with improvement. Course complicated by facial swelling with bite block. No SQ emphysema noted. CT NECK with _______. Lastly, course complicated anemia. Discussed with heme and HH baseline 6-7 and sickle cell labs no signs of acute sickle cell crisis. Intermittent transfusions given as needed and no further acute bleeding noted on RPT CT AP. Lastly, case discussed with sx and IR and unable to place PEG. Case discussed with sx and s/p open G TUBE placement on 4/18.     Case discussed with Dr Lisker and patient medically cleared and stable for discharge 43 YO M with PMHx of sickle cell disease with prior acute chest syndrome requiring transfusion and exchange in the past (last 12/2024), iron overload, gout, HTN, and RUE DVT in 12/2024 on eliquis who presented on 3/15 by his hematologist second to anemia and hypoxia concerning for vaso-occlusive crisis. Initially admitted to Medicine and anemia improved post transfusion and continued on SCC pain control PCA. Patient noted with left renal mass during prior admission and underwent L ureteroscopy with biopsy and stent placement on 3/19 with postprocedure course c/b hemorrhagic shock 2/2 left perirenal and subcapsular hemorrhage ultimately progressed to PEA arrest. ROSC achieved and transferred to MICU. Patient s/p IR emobilization with course c/b anoxic brain injury, myoclonic jerks, GIB s/p EGD 4/11 with cauterization of gastric ulcer & hemostatic spraying of three duodenal ulcers,  MATA likely d/t shock requiring brief period of temporary HD now normalized and weaned off HD, HAGMA, DVTs (R IJ and LUE brachial, L gastrocnemius), dysphagia s/p G tube, cirrhosis, with prolonged vent time requiring tracheostomy creation on 4/4. Eventually transferred to the RCU on 4/18. Renal biopsy positive for urothelial carcinoma. His course was c/b persistent fevers despite completing several courses of antibiotics for multiple infections including PNA and UTI and thus fevers suspected to be central in nature. Attempted bromocriptine however c/b transaminitis so now discontinued with improvement in transaminitis. known left kidney bleed vs known DVTs and overall curve improved. During his course he was found with tongue occlusive trauma requiring bite block placement by Dental and creation of mouth guard. Dysautonomic symptoms managed with klonopin. Failed multiple TOV attempts and daniel catheter remains in place. Patient also found with several DVTs as previously mentioned however given large bleed leading to cardiac arrest high risk for anticoagulation and after discussion with IR decision made to defer IVC filter. With fever spikes noted with Hypernatremia and started with FWF with improvement. Hypercalcemia noted likely second to hyperparathyroidism vs immobility/ vs dehydration, managed with IVF, given calcitonin, and started sensipar with improvement. Course complicated by facial swelling with bite block. No SQ emphysema noted. CT NECK with swollen tongue second to tongue biting, however no abscess. Lastly, course complicated anemia. Discussed with heme and HH baseline 6-7 and sickle cell labs no signs of acute sickle cell crisis. Intermittent transfusions given as needed and no further acute bleeding noted on RPT CT AP. Lastly, case discussed with sx and IR and unable to place PEG. Case discussed with sx and s/p open G TUBE placement on 4/18.     Case discussed with Dr Lisker and patient medically cleared and stable for discharge

## 2025-03-20 NOTE — DIETITIAN INITIAL EVALUATION ADULT - OTHER INFO
Pt. remains intubated & sedated with Propofol.  ~ 702 KCals to be provided by Propofol over 24hrs @ current rate.   Spoke with RN.    Plan is to start enteral feeding.

## 2025-03-20 NOTE — DIETITIAN INITIAL EVALUATION ADULT - PROBLEM SELECTOR PLAN 4
PAST MEDICAL HISTORY:  Acid reflux     CAD (coronary artery disease)     Diabetes     History of BPH     Hyperlipidemia     Hypertension     
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 Seen by Urology as outpatient and is scheduled for cystoscopy, left retrograde pyelogram left uteroscopy with laser ablation / biopsy , left ureteral stent on 3/19/2025  - Consulted Urology and d/w them, f/up further recs (likely to have procedure while inpatient)

## 2025-03-20 NOTE — PROGRESS NOTE ADULT - SUBJECTIVE AND OBJECTIVE BOX
Subjective:  Resting comfortably    Objective:    Vital signs  T(C): , Max: 37 (03-19-25 @ 11:05)  HR: 98 (03-20-25 @ 05:40)  BP: 121/61 (03-20-25 @ 05:40)  SpO2: 97% (03-20-25 @ 05:40)  Wt(kg): --    Output     03-19 @ 07:01  -  03-20 @ 07:00  --------------------------------------------------------  IN: 1574 mL / OUT: 0 mL / NET: 1574 mL        Gen: NAD  Abd: soft, nontender, nondistended  : voiding    Labs                        8.0    8.01  )-----------( 249      ( 19 Mar 2025 09:07 )             22.5     19 Mar 2025 09:07    137    |  104    |  27     ----------------------------<  84     5.0     |  24     |  0.85     Ca    9.4        19 Mar 2025 09:07  Mg     1.60      19 Mar 2025 09:07        Urine Cx: ?  Blood Cx: ?      Imaging

## 2025-03-20 NOTE — DIETITIAN INITIAL EVALUATION ADULT - NSICDXPASTMEDICALHX_GEN_ALL_CORE_FT
PAST MEDICAL HISTORY:  Deep vein thrombosis (DVT)     Gout     Hypertension     Iron overload     Sickle cell anemia

## 2025-03-20 NOTE — DISCHARGE NOTE PROVIDER - NSFOLLOWUPCLINICS_GEN_ALL_ED_FT
[facility]  Hematology/Oncology  [address] [id number]  Phone: [phone]  Fax:

## 2025-03-20 NOTE — DISCHARGE NOTE PROVIDER - CARE PROVIDERS DIRECT ADDRESSES
,DirectAddress_Unknown,DirectAddress_Unknown ,DirectAddress_Unknown,DirectAddress_Unknown,[email or web address]

## 2025-03-20 NOTE — CONSULT NOTE ADULT - ASSESSMENT
Interventional Radiology    Evaluate for Procedure: suspected renal bleed s/p biopsy     HPI: 44y Male with     Allergies: penicillin (Pruritus)  hydroxyurea (Other)  piperacillin-tazobactam (Urticaria)  ceftriaxone (Anaphylaxis)    Medications (Abx/Cardiac/Anticoagulation/Blood Products)    furosemide   Injectable: 20 milliGRAM(s) IV Push (03-18 @ 22:09)  labetalol Injectable: 10 milliGRAM(s) IV Push (03-19 @ 19:48)  metroNIDAZOLE  IVPB: 100 mL/Hr IV Intermittent (03-20 @ 14:39)  NIFEdipine XL: 30 milliGRAM(s) Oral (03-20 @ 05:44)  norepinephrine Infusion: 167 mL/Hr IV Continuous (03-20 @ 10:06)  vancomycin  IVPB: 250 mL/Hr IV Intermittent (03-20 @ 14:37)    Data:    T(C): 34.8  HR: 120  BP: 101/56  RR: 20  SpO2: 100%    -WBC 27.17 / HgB 5.5 / Hct 16.6 / Plt 132  -Na 140 / Cl 100 / BUN 51 / Glucose 112  -K 5.5 / CO2 9 / Cr 2.28  - / Alk Phos 119 / T.Bili 12.0  -INR 2.06 / PTT 39.6      Radiology:     Assessment/Plan:     -- IR will plan to perform   -- NPO at midnight on   -- hold a.m. anticoagulation on  -- please complete IR pre-procedure note  -- please place IR procedure request order under      --  Jadiel sOman, PGY2   Vascular and Interventional Radiology   Available on Microsoft Teams    - Non-emergent consults: Place IR consult order in [place]  - Emergent issues (pager): [facility] [phone]; [facility] [phone]; [phone]  - Scheduling questions: [facility] [phone]; [facility] [phone]  - Clinic/outpatient booking: [facility] [phone]; [facility] [phone] Interventional Radiology    Evaluate for Procedure: suspected renal bleed s/p biopsy     HPI: 44y Male with pmhx of SCD, UE DVT on eliquis, HTN, and L renal mass admitted initially for transfusion/treatment of sickle cell crisis now s/p cystoscopy with L uteroscopy + biopsy on 3/19. On 3/20/25 RRT was called for AMS, patient found to be hypoglycemic. 30 minutes later patient was found unresponsive and ROSC was achieved 4 min after start of compressions. Patient was upgraded to MICU. CT abdomen and pelvis on 3/20/25 showed Left     Allergies: penicillin (Pruritus)  hydroxyurea (Other)  piperacillin-tazobactam (Urticaria)  ceftriaxone (Anaphylaxis)    Medications (Abx/Cardiac/Anticoagulation/Blood Products)    furosemide   Injectable: 20 milliGRAM(s) IV Push (03-18 @ 22:09)  labetalol Injectable: 10 milliGRAM(s) IV Push (03-19 @ 19:48)  metroNIDAZOLE  IVPB: 100 mL/Hr IV Intermittent (03-20 @ 14:39)  NIFEdipine XL: 30 milliGRAM(s) Oral (03-20 @ 05:44)  norepinephrine Infusion: 167 mL/Hr IV Continuous (03-20 @ 10:06)  vancomycin  IVPB: 250 mL/Hr IV Intermittent (03-20 @ 14:37)    Data:    T(C): 34.8  HR: 120  BP: 101/56  RR: 20  SpO2: 100%    -WBC 27.17 / HgB 5.5 / Hct 16.6 / Plt 132  -Na 140 / Cl 100 / BUN 51 / Glucose 112  -K 5.5 / CO2 9 / Cr 2.28  - / Alk Phos 119 / T.Bili 12.0  -INR 2.06 / PTT 39.6      Radiology:     Assessment/Plan:     -- IR will plan to perform   -- NPO at midnight on   -- hold a.m. anticoagulation on  -- please complete IR pre-procedure note  -- please place IR procedure request order under      --  Jadiel Osman, PGY2   Vascular and Interventional Radiology   Available on Microsoft Teams    - Non-emergent consults: Place IR consult order in [place]  - Emergent issues (pager): [facility] [phone]; [facility] [phone]; [phone]  - Scheduling questions: [facility] [phone]; [facility] [phone]  - Clinic/outpatient booking: [facility] [phone]; [facility] [phone] Interventional Radiology    Evaluate for Procedure: suspected renal bleed s/p biopsy     HPI: 44y Male with pmhx of SCD, UE DVT on eliquis, HTN, and L renal mass admitted initially for transfusion/treatment of sickle cell crisis now s/p cystoscopy with L uteroscopy + biopsy on 3/19. On 3/20/25 RRT was called for AMS, patient found to be hypoglycemic. 30 minutes later patient was found unresponsive and ROSC was achieved 4 min after start of compressions. Patient was upgraded to MICU. CT abdomen and pelvis on 3/20/25 showed left perirenal and subcapsular hemorrhage without foci of active extravasation. IR consulted for renal angio with possible embolization.    Allergies: penicillin (Pruritus)  hydroxyurea (Other)  piperacillin-tazobactam (Urticaria)  ceftriaxone (Anaphylaxis)    Medications (Abx/Cardiac/Anticoagulation/Blood Products)    furosemide   Injectable: 20 milliGRAM(s) IV Push (03-18 @ 22:09)  labetalol Injectable: 10 milliGRAM(s) IV Push (03-19 @ 19:48)  metroNIDAZOLE  IVPB: 100 mL/Hr IV Intermittent (03-20 @ 14:39)  NIFEdipine XL: 30 milliGRAM(s) Oral (03-20 @ 05:44)  norepinephrine Infusion: 167 mL/Hr IV Continuous (03-20 @ 10:06)  vancomycin  IVPB: 250 mL/Hr IV Intermittent (03-20 @ 14:37)    Data:    T(C): 34.8  HR: 120  BP: 101/56  RR: 20  SpO2: 100%    -WBC 27.17 / HgB 5.5 / Hct 16.6 / Plt 132  -Na 140 / Cl 100 / BUN 51 / Glucose 112  -K 5.5 / CO2 9 / Cr 2.28  - / Alk Phos 119 / T.Bili 12.0  -INR 2.06 / PTT 39.6      Radiology:   reviewed    Assessment/Plan:   44y Male with pmhx of SCD, UE DVT on eliquis, HTN, and L renal mass admitted initially for transfusion/treatment of sickle cell crisis now s/p cystoscopy with L uteroscopy + biopsy on 3/19. On 3/20/25 RRT was called for AMS, patient found to be hypoglycemic. 30 minutes later patient was found unresponsive and ROSC was achieved 4 min after start of compressions. Patient was upgraded to MICU. CT abdomen and pelvis on 3/20/25 showed left perirenal and subcapsular hemorrhage without foci of active extravasation. IR consulted for renal angio with possible embolization.    Following ROSC, patient was admitted to MICU, intubated on 2 pressors and has since stabilized to 1 pressor. K initially at 6 and with intervention at 4.8. Per primary team, patient stable to be transported from the unit to IR suite.     -- IR will plan to perform L renal angiogram with possible embolization on 3/20/25  -- keep npo  -- hold anticoagulation   -- please complete IR pre-procedure note  -- please place IR procedure request order under Dr. Tucker    --  Jadiel Osman, PGY3   Vascular and Interventional Radiology   Available on [employer] Teams    - Non-emergent consults: Place IR consult order in [place]  - Emergent issues (pager): [facility] [phone]; [facility] [phone]; [phone]  - Scheduling questions: [facility] [phone]; [facility] [phone]  - Clinic/outpatient booking: [facility] [phone]; [facility] [phone]

## 2025-03-20 NOTE — DIETITIAN INITIAL EVALUATION ADULT - REASON FOR ADMISSION
43 y/o M with HTN, gout, daily THC use, and sickle cell disease with prior chest syndrome, iron overload & r/o urothelial neoplasm.  Admitted for anemia concerning for sickle cell crisis.    S/p RRT  for hypoglycemia that resolved with d50, however had another RRT for repeated hypoglycemia, however patient found to be pulseless, code blue called for PEA. ROSC achieved in approximately 4 minutes and patient intubated and transferred to MICU.

## 2025-03-20 NOTE — PROCEDURE NOTE - PROCEDURE FINDINGS AND DETAILS
Right common femoral artery access.   Left renal angiogram with irregularity of left lower pole branch.  Coil embolization performed with decreased flow.   Mynx closure device. Right common femoral artery access.   Left renal angiogram with irregularity of left lower pole arterial branch.  Coil embolization performed with decreased flow.   Mynx closure device.  Official report to follow.

## 2025-03-20 NOTE — DIETITIAN INITIAL EVALUATION ADULT - PERTINENT LABORATORY DATA
03-20    140  |  100  |  51[H]  ----------------------------<  112[H]  5.5[H]   |  9[LL]  |  2.28[H]    Ca    9.2      20 Mar 2025 14:26  Phos  8.9     03-20  Mg     1.70     03-20      CAPILLARY BLOOD GLUCOSE    POCT Blood Glucose.: 130 mg/dL (20 Mar 2025 14:31)  POCT Blood Glucose.: 101 mg/dL (20 Mar 2025 13:33)  POCT Blood Glucose.: 77 mg/dL (20 Mar 2025 12:39)  POCT Blood Glucose.: 79 mg/dL (20 Mar 2025 11:12)  POCT Blood Glucose.: 173 mg/dL (20 Mar 2025 10:00)  POCT Blood Glucose.: 49 mg/dL (20 Mar 2025 09:18)  POCT Blood Glucose.: 48 mg/dL (20 Mar 2025 09:17)  POCT Blood Glucose.: 51 mg/dL (20 Mar 2025 09:02)

## 2025-03-20 NOTE — PROCEDURE NOTE - PLAN
Extremity straight x 4 hours.   Hold AC. Right lower extremity straight x 4 hours.   Hold AC.    Case d/w MICU team,

## 2025-03-20 NOTE — DIETITIAN INITIAL EVALUATION ADULT - PROBLEM SELECTOR PLAN 3
During last admission was found to have RUE DVT and was started on eliquis  - Duplex 12/31: Right Internal jugular Vein and left brachial vein  - Eliquis on hold due to upcoming  procedure scheduled for 3/19  - Repeat UE Duplex  - Full dose lovenox for now, last dose of eliquis 3/14/2025

## 2025-03-20 NOTE — DIETITIAN INITIAL EVALUATION ADULT - ENTERAL
-- Initiate Nepro @ 10mL/hr.  Increase by 10mL q 4hrs, as tolerated, to goal of 45mL/hr x 18hrs + Liquid Protein Supplement (LPS) 3 packets per day  810mL total volume  1458 Kcals  (+ 300 KCals from LPS) = 1758 KCals (+ Propofol KCals)  66g protein (+45g protein from LPS) = 111g protein  589mL free water      TF+LPS will provide ~20 KCals/kg actual weight & 1.25g protein/kg actual weight

## 2025-03-20 NOTE — DISCHARGE NOTE PROVIDER - NSDCCONDITION_GEN_ALL_CORE
Stable Double Island Pedicle Flap Text: The defect edges were debeveled with a #15 scalpel blade. Given the location of the defect, shape of the defect and the proximity to free margins a double island pedicle advancement flap was deemed most appropriate. Using a sterile surgical marker, an appropriate advancement flap was drawn incorporating the defect, outlining the appropriate donor tissue and placing the expected incisions within the relaxed skin tension lines where possible. The area thus outlined was incised deep to adipose tissue with a #15 scalpel blade. The skin margins were undermined to an appropriate distance in all directions around the primary defect and laterally outward around the island pedicle utilizing iris scissors.  There was minimal undermining beneath the pedicle flap. Following this, the flap was carried over into the primary defect and sutured into place.

## 2025-03-20 NOTE — PRE PROCEDURE NOTE - PRE PROCEDURE EVALUATION
Interventional Radiology    HPI: 44y Male with pmhx of SCD, UE DVT on eliquis, HTN, and L renal mass admitted initially for transfusion/treatment of sickle cell crisis now s/p cystoscopy with L uteroscopy + biopsy on 3/19. On 3/20/25 RRT was called for AMS, patient found to be hypoglycemic. 30 minutes later patient was found unresponsive and ROSC was achieved 4 min after start of compressions. Patient was upgraded to MICU. CT abdomen and pelvis on 3/20/25 showed left perirenal and subcapsular hemorrhage without foci of active extravasation. IR consulted for renal angio with possible embolization.    Allergies: penicillin (Pruritus)  hydroxyurea (Other)  piperacillin-tazobactam (Urticaria)  ceftriaxone (Anaphylaxis)    Medications (Abx/Cardiac/Anticoagulation/Blood Products)  aztreonam  IVPB: 100 mL/Hr IV Intermittent (03-20 @ 15:54)  furosemide   Injectable: 20 milliGRAM(s) IV Push (03-18 @ 22:09)  labetalol Injectable: 10 milliGRAM(s) IV Push (03-19 @ 19:48)  metroNIDAZOLE  IVPB: 100 mL/Hr IV Intermittent (03-20 @ 14:39)  NIFEdipine XL: 30 milliGRAM(s) Oral (03-20 @ 05:44)  norepinephrine Infusion: 167 mL/Hr IV Continuous (03-20 @ 10:06)  vancomycin  IVPB: 250 mL/Hr IV Intermittent (03-20 @ 14:37)    Data:    T(C): 37.4  HR: 142  BP: 101/56  RR: 21  SpO2: 93%    Exam  General: Intubated    -WBC 34.74 / HgB 6.7 / Hct 19.4 / Plt 216  -Na 141 / Cl 100 / BUN 52 / Glucose 225  -K 4.8 / CO2 13 / Cr 2.26  - / Alk Phos 103 / T.Bili 12.3  -INR1.86    Imaging: Reviewed    Plan: 44y Male presents for left renal angiogram, possible embolization.   -Risks/Benefits/alternatives explained with the patient and/or healthcare proxy and witnessed informed consent obtained.    Interventional Radiology    HPI: 44y Male with pmhx of SCD, UE DVT on Eliquis HTN, and L renal mass admitted initially for transfusion/treatment of sickle cell crisis now s/p cystoscopy with L uteroscopy + biopsy on 3/19. On 3/20/25 RRT was called for AMS, patient found to be hypoglycemic. 30 minutes later patient was found unresponsive and ROSC was achieved 4 min after start of compressions. Patient was upgraded to MICU. CT abdomen and pelvis on 3/20/25 showed left perirenal and subcapsular hemorrhage without foci of active extravasation. IR consulted for renal angio with possible embolization.    Allergies: penicillin (Pruritus)  hydroxyurea (Other)  piperacillin-tazobactam (Urticaria)  ceftriaxone (Anaphylaxis)    Medications (Abx/Cardiac/Anticoagulation/Blood Products)  aztreonam  IVPB: 100 mL/Hr IV Intermittent (03-20 @ 15:54)  furosemide   Injectable: 20 milliGRAM(s) IV Push (03-18 @ 22:09)  labetalol Injectable: 10 milliGRAM(s) IV Push (03-19 @ 19:48)  metroNIDAZOLE  IVPB: 100 mL/Hr IV Intermittent (03-20 @ 14:39)  NIFEdipine XL: 30 milliGRAM(s) Oral (03-20 @ 05:44)  norepinephrine Infusion: 167 mL/Hr IV Continuous (03-20 @ 10:06)  vancomycin  IVPB: 250 mL/Hr IV Intermittent (03-20 @ 14:37)    Data:    T(C): 37.4  HR: 142  BP: 101/56  RR: 21  SpO2: 93%    Exam  General: Intubated    -WBC 34.74 / HgB 6.7 / Hct 19.4 / Plt 216  -Na 141 / Cl 100 / BUN 52 / Glucose 225  -K 4.8 / CO2 13 / Cr 2.26  - / Alk Phos 103 / T.Bili 12.3  -INR1.86    Imaging: Reviewed    Plan: 44y Male presents for left renal angiogram, possible embolization.   He is in critical condition however has stabilized over the day. Explained to family that he the intention would be to prevent repeat bleeding. He would still be in critical condition post procedure.     -Risks/Benefits/alternatives explained with the patient and/or healthcare proxy and witnessed informed consent obtained.

## 2025-03-20 NOTE — CONSULT NOTE ADULT - ASSESSMENT
44M pw sickle cell crisis s/p left renal bx cb hemorrhagic shock and PEA arrest s/p ROSC and IR embolization of left renal artery.    Recommendations:  -No concerns for phlegmasia at this time.   -Will f/u LUE duplex ordered by primary team.  -If concern for compartment syndrome, can consult hand surgery.    Discussed with vascular surgery fellow on behalf of Dr. Gomes.    C Team/Vascular Surgery  Please page [phone] for all questions.

## 2025-03-20 NOTE — DISCHARGE NOTE PROVIDER - DETAILS OF MALNUTRITION DIAGNOSIS/DIAGNOSES
This patient has been assessed with a concern for Malnutrition and was treated during this hospitalization for the following Nutrition diagnosis/diagnoses:     -  04/15/2025: Severe protein-calorie malnutrition

## 2025-03-20 NOTE — DIETITIAN INITIAL EVALUATION ADULT - PROBLEM SELECTOR PLAN 1
H/o Sickle cell disease with multiple admissions for crisis, last on 12/2024, s/p transfusion/exchange transfusion  Referred by outpatient Hematologist for transfusion prior to  procedure on 3/19, Hg 5.3 as outpatient  - S/p 1U PRBC w/ adequate response, baseline Hg around 6.0  - Also, w/ acute sickle cell crisis, will start dilaudid PCA 0.5 demand, 6 min lockout, 4H limit 20  - On oxycodone 30mg q6h PRN as outpatient based on most recent I-stop  - Monitor CBC/LDH/LFT's/reticulocytes  - Transfuse to maintain Hg >6  - C/w folic acid, IVF's, Incentive spirometer  - Bowel regimen w/ miralax/dulcolax  - Seen by Hematology ([facility]), apprec recs  - Out[facility] f[facility]

## 2025-03-20 NOTE — CHART NOTE - NSCHARTNOTEFT_GEN_A_CORE
PRE-INTERVENTIONAL RADIOLOGY PROCEDURE NOTE  ***************************************************************  Sharonda Barron Lissy, PGY1  Internal Medicine   [employer] TEAMS  ***************************************************************  Patient Name: TOLU VARGAS    Patient Age: 44y    Patient Gender: Male    Procedure: Kidney embolization     Diagnosis/Indication: Hemorrhage s/p kidney biopsy    Interventional Radiology Attending Physician: Dr. Tucker    Ordering Attending Physician: Dr. Egan    Pertinent Medical History: 44M with PMHx of Sickle cell disease (last transfusion/exchange transfusion 12/2024), prior acute chest syndrome, iron overload, Gout, HTN and RUE DVT 12/2024 on eliquis (on hold since 3/15 for upcoming cystoscopy, ureteroscopy on 3/19) initially sent in for low hemoglobin on outpatient labs, admitted for sickle cell crisis. Now s/p L ureteroscopy + stent + biopsy by urology. C/c/b PEA arrest likely due to hemorrhage, now intubated and admitted to MICU for further management.     Pertinent labs:                      6.7    34.74 )-----------( 216      ( 20 Mar 2025 15:20 )             19.4       03-20    140  |  100  |  51[H]  ----------------------------<  112[H]  5.5[H]   |  9[LL]  |  2.28[H]    Ca    9.2      20 Mar 2025 14:26  Phos  8.9     03-20  Mg     1.70     03-20    TPro  4.7[L]  /  Alb  2.6[L]  /  TBili  12.0[H]  /  DBili  x   /  AST  542[H]  /  ALT  196[H]  /  AlkPhos  119  03-20      PT/INR - ( 20 Mar 2025 14:26 )   PT: 23.7 sec;   INR: 2.06 ratio         PTT - ( 20 Mar 2025 14:26 )  PTT:39.6 sec        Patient and Family Aware ? Yes

## 2025-03-20 NOTE — DIETITIAN INITIAL EVALUATION ADULT - CALCULATED TO (CAL/KG)
Subjective  Patient seen and examined.  Lower extremity swelling and pain better.  Status post synovial  fluid analysis negative for crystals , uric acid elevated patient overall feels better however wants to know why he is having swelling and  the pain    Review of Systems   Constitutional: Negative for chills and fever.   HENT: Negative for hearing loss.    Eyes: Negative for visual disturbance.   Respiratory: Negative for cough, chest tightness and shortness of breath.    Cardiovascular: Negative for chest pain, palpitations and leg swelling.   Gastrointestinal: Negative for abdominal pain and diarrhea.   Endocrine: Negative for polyuria.   Genitourinary: Negative for dysuria and flank pain.   Neurological: Negative for dizziness, weakness and headaches.   Psychiatric/Behavioral: Negative for agitation.       Objective    Last Recorded Vitals  Blood pressure (!) 152/75, pulse 66, temperature 98.4 °F (36.9 °C), temperature source Oral, resp. rate 20, height 6' 4\" (1.93 m), weight 118.6 kg (261 lb 7.5 oz), SpO2 96 %.  Body mass index is 31.83 kg/m².    Physical Exam  Vitals reviewed.   Constitutional:       General: He is not in acute distress.     Appearance: Normal appearance.   HENT:      Head: Normocephalic.      Mouth/Throat:      Mouth: Mucous membranes are moist.   Eyes:      Conjunctiva/sclera: Conjunctivae normal.   Cardiovascular:      Rate and Rhythm: Normal rate and regular rhythm.      Heart sounds: Normal heart sounds. No murmur heard.     Pulmonary:      Effort: No respiratory distress.      Breath sounds: No wheezing.   Abdominal:      General: There is no distension.      Tenderness: There is no abdominal tenderness. There is no guarding or rebound.   Musculoskeletal:         General: No swelling.      Cervical back: Neck supple.      Left lower leg: No edema.   Skin:     Findings: No bruising or rash.   Neurological:      General: No focal deficit present.      Mental Status: He is alert.    Psychiatric:         Mood and Affect: Mood normal.         Behavior: Behavior normal.           Intake/Output Summary (Last 24 hours) at 8/10/2021 2047  Last data filed at 8/10/2021 2000  Gross per 24 hour   Intake 1200 ml   Output 1800 ml   Net -600 ml        Labs   Recent Results (from the past 24 hour(s))   Basic Metabolic Panel    Collection Time: 08/10/21  4:41 AM   Result Value Ref Range    Fasting Status      Sodium 137 135 - 145 mmol/L    Potassium 4.8 3.4 - 5.1 mmol/L    Chloride 103 98 - 107 mmol/L    Carbon Dioxide 18 (L) 21 - 32 mmol/L    Anion Gap 21 (H) 10 - 20 mmol/L    Glucose 223 (H) 65 - 99 mg/dL    BUN 60 (H) 6 - 20 mg/dL    Creatinine 5.35 (H) 0.67 - 1.17 mg/dL    Glomerular Filtration Rate 12 (L) >90 mL/min/1.73m2    BUN/ Creatinine Ratio 11 7 - 25    Calcium 8.2 (L) 8.4 - 10.2 mg/dL   Creatine Kinase    Collection Time: 08/10/21  4:41 AM   Result Value Ref Range     39 - 308 Units/L   CBC with Automated Differential (performable only)    Collection Time: 08/10/21  4:41 AM   Result Value Ref Range    WBC 7.9 4.2 - 11.0 K/mcL    RBC 4.18 (L) 4.50 - 5.90 mil/mcL    HGB 11.3 (L) 13.0 - 17.0 g/dL    HCT 35.5 (L) 39.0 - 51.0 %    MCV 84.9 78.0 - 100.0 fl    MCH 27.0 26.0 - 34.0 pg    MCHC 31.8 (L) 32.0 - 36.5 g/dL    RDW-CV 13.9 11.0 - 15.0 %    RDW-SD 43.3 39.0 - 50.0 fL     140 - 450 K/mcL    NRBC 0 <=0 /100 WBC    Neutrophil, Percent 84 %    Lymphocytes, Percent 8 %    Mono, Percent 7 %    Eosinophils, Percent 0 %    Basophils, Percent 0 %    Immature Granulocytes 1 %    Absolute Neutrophils 6.7 1.8 - 7.7 K/mcL    Absolute Lymphocytes 0.6 (L) 1.0 - 4.0 K/mcL    Absolute Monocytes 0.6 0.3 - 0.9 K/mcL    Absolute Eosinophils  0.0 0.0 - 0.5 K/mcL    Absolute Basophils 0.0 0.0 - 0.3 K/mcL    Absolute Immmature Granulocytes 0.0 0.0 - 0.2 K/mcL   GLUCOSE, BEDSIDE - POINT OF CARE    Collection Time: 08/10/21  7:34 AM   Result Value Ref Range    GLUCOSE, BEDSIDE - POINT OF CARE 215 (H) 70 -  8136 99 mg/dL   GLUCOSE, BEDSIDE - POINT OF CARE    Collection Time: 08/10/21 11:22 AM   Result Value Ref Range    GLUCOSE, BEDSIDE - POINT OF CARE 214 (H) 70 - 99 mg/dL   GLUCOSE, BEDSIDE - POINT OF CARE    Collection Time: 08/10/21  5:35 PM   Result Value Ref Range    GLUCOSE, BEDSIDE - POINT OF CARE 220 (H) 70 - 99 mg/dL   GLUCOSE, BEDSIDE - POINT OF CARE    Collection Time: 08/10/21  8:41 PM   Result Value Ref Range    GLUCOSE, BEDSIDE - POINT OF CARE 248 (H) 70 - 99 mg/dL        Imaging  No results found.    US VASC LOWER EXTREMITY VENOUS DUPLEX BILATERAL   Final Result      No evidence of acute DVT of the investigated bilateral lower extremities as   above.            Electronically Signed by: MARIELA LEZAMA MD    Signed on: 8/9/2021 2:52 PM          XR ANKLE MIN 3 VIEWS BILATERAL   Final Result   1.    Mild diffuse soft tissue swelling of the bilateral ankles without   acute fracture or traumatic malalignment.   2.   Old posttraumatic and mild to moderate degenerative changes detailed   above.         Electronically Signed by: OSIEL MARLEY MD    Signed on: 8/9/2021 10:08 AM          XR FOOT MIN 3 VIEWS LEFT   Final Result   FINDINGS/IMPRESSION:      No gross acute fracture or dislocation.      No apparent erosive changes.      No definite radiopaque foreign bodies or soft tissue gas.      Advanced atherosclerotic vascular calcifications are present.      Electronically Signed by: WANDER YA MD    Signed on: 8/8/2021 10:17 PM              LAST ECHO/ECHO STRESS:  No valid procedures specified.  No results found for this or any previous visit. \"}      Assessment and Plan    59 year old male with past medical history of diabetes mellitus type 2, CKD stage IV-5, hypertension, hyperlipidemia, CHF presented to the hospital because of bilateral ankle pain and swelling     Bilateral ankle inflammation versus infection  Possibly gout versus septic arthritis  X-ray of the left foot no acute finding  Uric acid elevated at  11.0  Orthopedic on consult planning for joint tapping  Patient is covered with Rocephin and Dapto   ID on consult  We will start patient on steroids provided joint aspiration did not show signs of infection  ID and orthopedic on consult  08/10: Synovial fluid analysis without any crystals.  Looks like some kind of inflammatory arthritis patient advised to follow-up with outpatient rheumatology if swelling and pain recurs for now we will treat with prednisone unable to give  NSAIDs secondary to CKD  On Rocephin and daptomycin     Left lower extremity edema  We will check venous Dopplers  08/10: Venous Dopplers without any DVT     Chronic kidney disease stage IV-5  Follow-up with nephrology as an outpatient     Diabetes mellitus type 2  On Accu-Chek and insulin sliding scale     Hypertension  Stable  Resume medication   08/10: On amlodipine, Coreg, Lasix, hydralazine, Imdur    Hyperlipidemia  On statin     Chronic diastolic CHF  Stable  Continue Lasix, hydralazine, Imdur, Coreg    Home once cleared by Ortho and ID likely in a.m. with outpatient rheumatology follow-up    DVT Prophylaxis      Disposition:

## 2025-03-21 ENCOUNTER — RESULT REVIEW (OUTPATIENT)
Age: 45
End: 2025-03-21

## 2025-03-21 LAB
ADD ON TEST-SPECIMEN IN LAB: SIGNIFICANT CHANGE UP
ADD ON TEST-SPECIMEN IN LAB: SIGNIFICANT CHANGE UP
ALBUMIN SERPL ELPH-MCNC: 3 G/DL — LOW (ref 3.3–5)
ALP SERPL-CCNC: 89 U/L — SIGNIFICANT CHANGE UP (ref 40–120)
ALT FLD-CCNC: 262 U/L — HIGH (ref 4–41)
ANION GAP SERPL CALC-SCNC: 15 MMOL/L — HIGH (ref 7–14)
ANION GAP SERPL CALC-SCNC: 16 MMOL/L — HIGH (ref 7–14)
ANION GAP SERPL CALC-SCNC: 20 MMOL/L — HIGH (ref 7–14)
APTT BLD: 31.6 SEC — SIGNIFICANT CHANGE UP (ref 24.5–35.6)
AST SERPL-CCNC: 671 U/L — HIGH (ref 4–40)
BASOPHILS # BLD AUTO: 0.08 K/UL — SIGNIFICANT CHANGE UP (ref 0–0.2)
BASOPHILS # BLD AUTO: 0.13 K/UL — SIGNIFICANT CHANGE UP (ref 0–0.2)
BASOPHILS NFR BLD AUTO: 0.3 % — SIGNIFICANT CHANGE UP (ref 0–2)
BASOPHILS NFR BLD AUTO: 0.6 % — SIGNIFICANT CHANGE UP (ref 0–2)
BILIRUB DIRECT SERPL-MCNC: 12.5 MG/DL — HIGH (ref 0–0.3)
BILIRUB SERPL-MCNC: 13.8 MG/DL — HIGH (ref 0.2–1.2)
BILIRUB SERPL-MCNC: 15.2 MG/DL — HIGH (ref 0.2–1.2)
BUN SERPL-MCNC: 61 MG/DL — HIGH (ref 7–23)
BUN SERPL-MCNC: 71 MG/DL — HIGH (ref 7–23)
BUN SERPL-MCNC: 74 MG/DL — HIGH (ref 7–23)
CALCIUM SERPL-MCNC: 8.9 MG/DL — SIGNIFICANT CHANGE UP (ref 8.4–10.5)
CALCIUM SERPL-MCNC: 9 MG/DL — SIGNIFICANT CHANGE UP (ref 8.4–10.5)
CALCIUM SERPL-MCNC: 9.6 MG/DL — SIGNIFICANT CHANGE UP (ref 8.4–10.5)
CHLORIDE SERPL-SCNC: 97 MMOL/L — LOW (ref 98–107)
CHLORIDE SERPL-SCNC: 97 MMOL/L — LOW (ref 98–107)
CHLORIDE SERPL-SCNC: 98 MMOL/L — SIGNIFICANT CHANGE UP (ref 98–107)
CK SERPL-CCNC: 6179 U/L — HIGH (ref 30–200)
CO2 SERPL-SCNC: 20 MMOL/L — LOW (ref 22–31)
CO2 SERPL-SCNC: 24 MMOL/L — SIGNIFICANT CHANGE UP (ref 22–31)
CO2 SERPL-SCNC: 24 MMOL/L — SIGNIFICANT CHANGE UP (ref 22–31)
CREAT SERPL-MCNC: 2.38 MG/DL — HIGH (ref 0.5–1.3)
CREAT SERPL-MCNC: 2.85 MG/DL — HIGH (ref 0.5–1.3)
CREAT SERPL-MCNC: 3.35 MG/DL — HIGH (ref 0.5–1.3)
EGFR: 22 ML/MIN/1.73M2 — LOW
EGFR: 22 ML/MIN/1.73M2 — LOW
EGFR: 27 ML/MIN/1.73M2 — LOW
EGFR: 27 ML/MIN/1.73M2 — LOW
EGFR: 33 ML/MIN/1.73M2 — LOW
EGFR: 33 ML/MIN/1.73M2 — LOW
EOSINOPHIL # BLD AUTO: 0.08 K/UL — SIGNIFICANT CHANGE UP (ref 0–0.5)
EOSINOPHIL # BLD AUTO: 0.21 K/UL — SIGNIFICANT CHANGE UP (ref 0–0.5)
EOSINOPHIL NFR BLD AUTO: 0.4 % — SIGNIFICANT CHANGE UP (ref 0–6)
EOSINOPHIL NFR BLD AUTO: 0.8 % — SIGNIFICANT CHANGE UP (ref 0–6)
GLUCOSE BLDC GLUCOMTR-MCNC: 110 MG/DL — HIGH (ref 70–99)
GLUCOSE BLDC GLUCOMTR-MCNC: 115 MG/DL — HIGH (ref 70–99)
GLUCOSE BLDC GLUCOMTR-MCNC: 115 MG/DL — HIGH (ref 70–99)
GLUCOSE BLDC GLUCOMTR-MCNC: 124 MG/DL — HIGH (ref 70–99)
GLUCOSE SERPL-MCNC: 102 MG/DL — HIGH (ref 70–99)
GLUCOSE SERPL-MCNC: 106 MG/DL — HIGH (ref 70–99)
GLUCOSE SERPL-MCNC: 99 MG/DL — SIGNIFICANT CHANGE UP (ref 70–99)
HAPTOGLOB SERPL-MCNC: <20 MG/DL — LOW (ref 34–200)
HCT VFR BLD CALC: 22.5 % — LOW (ref 39–50)
HCT VFR BLD CALC: 22.6 % — LOW (ref 39–50)
HGB BLD-MCNC: 8.2 G/DL — LOW (ref 13–17)
HGB BLD-MCNC: 8.4 G/DL — LOW (ref 13–17)
IANC: 18.74 K/UL — HIGH (ref 1.8–7.4)
IANC: 22.14 K/UL — HIGH (ref 1.8–7.4)
IMM GRANULOCYTES NFR BLD AUTO: 0.8 % — SIGNIFICANT CHANGE UP (ref 0–0.9)
IMM GRANULOCYTES NFR BLD AUTO: 1 % — HIGH (ref 0–0.9)
INR BLD: 1.51 RATIO — HIGH (ref 0.85–1.16)
LDH SERPL L TO P-CCNC: 1899 U/L — HIGH (ref 135–225)
LYMPHOCYTES # BLD AUTO: 1.38 K/UL — SIGNIFICANT CHANGE UP (ref 1–3.3)
LYMPHOCYTES # BLD AUTO: 1.99 K/UL — SIGNIFICANT CHANGE UP (ref 1–3.3)
LYMPHOCYTES # BLD AUTO: 5.4 % — LOW (ref 13–44)
LYMPHOCYTES # BLD AUTO: 8.8 % — LOW (ref 13–44)
MAGNESIUM SERPL-MCNC: 1.7 MG/DL — SIGNIFICANT CHANGE UP (ref 1.6–2.6)
MCHC RBC-ENTMCNC: 30.1 PG — SIGNIFICANT CHANGE UP (ref 27–34)
MCHC RBC-ENTMCNC: 30.5 PG — SIGNIFICANT CHANGE UP (ref 27–34)
MCHC RBC-ENTMCNC: 36.4 G/DL — HIGH (ref 32–36)
MCHC RBC-ENTMCNC: 37.2 G/DL — HIGH (ref 32–36)
MCV RBC AUTO: 82.2 FL — SIGNIFICANT CHANGE UP (ref 80–100)
MCV RBC AUTO: 82.7 FL — SIGNIFICANT CHANGE UP (ref 80–100)
MONOCYTES # BLD AUTO: 1.29 K/UL — HIGH (ref 0–0.9)
MONOCYTES # BLD AUTO: 1.48 K/UL — HIGH (ref 0–0.9)
MONOCYTES NFR BLD AUTO: 5.1 % — SIGNIFICANT CHANGE UP (ref 2–14)
MONOCYTES NFR BLD AUTO: 6.6 % — SIGNIFICANT CHANGE UP (ref 2–14)
NEUTROPHILS # BLD AUTO: 18.74 K/UL — HIGH (ref 1.8–7.4)
NEUTROPHILS # BLD AUTO: 22.14 K/UL — HIGH (ref 1.8–7.4)
NEUTROPHILS NFR BLD AUTO: 82.8 % — HIGH (ref 43–77)
NEUTROPHILS NFR BLD AUTO: 87.4 % — HIGH (ref 43–77)
NRBC # BLD AUTO: 0.07 K/UL — HIGH (ref 0–0)
NRBC # BLD AUTO: 0.13 K/UL — HIGH (ref 0–0)
NRBC # FLD: 0.07 K/UL — HIGH (ref 0–0)
NRBC # FLD: 0.13 K/UL — HIGH (ref 0–0)
NRBC BLD AUTO-RTO: 0 /100 WBCS — SIGNIFICANT CHANGE UP (ref 0–0)
NRBC BLD AUTO-RTO: 0 /100 WBCS — SIGNIFICANT CHANGE UP (ref 0–0)
PHOSPHATE SERPL-MCNC: 5.6 MG/DL — HIGH (ref 2.5–4.5)
PLATELET # BLD AUTO: 170 K/UL — SIGNIFICANT CHANGE UP (ref 150–400)
PLATELET # BLD AUTO: 186 K/UL — SIGNIFICANT CHANGE UP (ref 150–400)
POTASSIUM SERPL-MCNC: 4.4 MMOL/L — SIGNIFICANT CHANGE UP (ref 3.5–5.3)
POTASSIUM SERPL-MCNC: 4.5 MMOL/L — SIGNIFICANT CHANGE UP (ref 3.5–5.3)
POTASSIUM SERPL-MCNC: 5 MMOL/L — SIGNIFICANT CHANGE UP (ref 3.5–5.3)
POTASSIUM SERPL-SCNC: 4.4 MMOL/L — SIGNIFICANT CHANGE UP (ref 3.5–5.3)
POTASSIUM SERPL-SCNC: 4.5 MMOL/L — SIGNIFICANT CHANGE UP (ref 3.5–5.3)
POTASSIUM SERPL-SCNC: 5 MMOL/L — SIGNIFICANT CHANGE UP (ref 3.5–5.3)
PROT SERPL-MCNC: 5.3 G/DL — LOW (ref 6–8.3)
PROTHROM AB SERPL-ACNC: 17.5 SEC — HIGH (ref 9.9–13.4)
RBC # BLD: 2.72 M/UL — LOW (ref 4.2–5.8)
RBC # BLD: 2.75 M/UL — LOW (ref 4.2–5.8)
RBC # BLD: 2.75 M/UL — LOW (ref 4.2–5.8)
RBC # FLD: 16.9 % — HIGH (ref 10.3–14.5)
RBC # FLD: 17 % — HIGH (ref 10.3–14.5)
RETICS #: 60.5 K/UL — SIGNIFICANT CHANGE UP (ref 25–125)
RETICS/RBC NFR: 2.2 % — SIGNIFICANT CHANGE UP (ref 0.5–2.5)
SODIUM SERPL-SCNC: 136 MMOL/L — SIGNIFICANT CHANGE UP (ref 135–145)
SODIUM SERPL-SCNC: 137 MMOL/L — SIGNIFICANT CHANGE UP (ref 135–145)
SODIUM SERPL-SCNC: 138 MMOL/L — SIGNIFICANT CHANGE UP (ref 135–145)
WBC # BLD: 22.59 K/UL — HIGH (ref 3.8–10.5)
WBC # BLD: 25.35 K/UL — HIGH (ref 3.8–10.5)
WBC # FLD AUTO: 22.59 K/UL — HIGH (ref 3.8–10.5)
WBC # FLD AUTO: 25.35 K/UL — HIGH (ref 3.8–10.5)

## 2025-03-21 PROCEDURE — 95720 EEG PHY/QHP EA INCR W/VEEG: CPT

## 2025-03-21 PROCEDURE — 99231 SBSQ HOSP IP/OBS SF/LOW 25: CPT | Mod: GC

## 2025-03-21 PROCEDURE — 99292 CRITICAL CARE ADDL 30 MIN: CPT | Mod: GC

## 2025-03-21 PROCEDURE — 99223 1ST HOSP IP/OBS HIGH 75: CPT | Mod: GC

## 2025-03-21 PROCEDURE — 71045 X-RAY EXAM CHEST 1 VIEW: CPT | Mod: 26

## 2025-03-21 PROCEDURE — 99291 CRITICAL CARE FIRST HOUR: CPT | Mod: GC

## 2025-03-21 PROCEDURE — 93971 EXTREMITY STUDY: CPT | Mod: 26,LT

## 2025-03-21 RX ORDER — SODIUM CHLORIDE 9 G/1000ML
1000 INJECTION, SOLUTION INTRAVENOUS
Refills: 0 | Status: DISCONTINUED | OUTPATIENT
Start: 2025-03-21 | End: 2025-03-21

## 2025-03-21 RX ORDER — MAGNESIUM SULFATE 500 MG/ML
1 SYRINGE (ML) INJECTION ONCE
Refills: 0 | Status: COMPLETED | OUTPATIENT
Start: 2025-03-21 | End: 2025-03-21

## 2025-03-21 RX ORDER — LEVETIRACETAM 10 MG/ML
750 INJECTION, SOLUTION INTRAVENOUS DAILY
Refills: 0 | Status: DISCONTINUED | OUTPATIENT
Start: 2025-03-21 | End: 2025-03-22

## 2025-03-21 RX ORDER — DEXTROSE 50 % IN WATER 50 %
12.5 SYRINGE (ML) INTRAVENOUS ONCE
Refills: 0 | Status: DISCONTINUED | OUTPATIENT
Start: 2025-03-21 | End: 2025-03-21

## 2025-03-21 RX ORDER — MIDAZOLAM IN 0.9 % SOD.CHLORID 1 MG/ML
4 PLASTIC BAG, INJECTION (ML) INTRAVENOUS ONCE
Refills: 0 | Status: DISCONTINUED | OUTPATIENT
Start: 2025-03-21 | End: 2025-03-21

## 2025-03-21 RX ORDER — MIDAZOLAM IN 0.9 % SOD.CHLORID 1 MG/ML
0.1 PLASTIC BAG, INJECTION (ML) INTRAVENOUS
Qty: 100 | Refills: 0 | Status: DISCONTINUED | OUTPATIENT
Start: 2025-03-21 | End: 2025-03-21

## 2025-03-21 RX ORDER — LEVETIRACETAM 10 MG/ML
2500 INJECTION, SOLUTION INTRAVENOUS ONCE
Refills: 0 | Status: COMPLETED | OUTPATIENT
Start: 2025-03-21 | End: 2025-03-21

## 2025-03-21 RX ORDER — DEXTROSE 50 % IN WATER 50 %
15 SYRINGE (ML) INTRAVENOUS ONCE
Refills: 0 | Status: DISCONTINUED | OUTPATIENT
Start: 2025-03-21 | End: 2025-03-21

## 2025-03-21 RX ORDER — INSULIN GLARGINE-YFGN 100 [IU]/ML
30 INJECTION, SOLUTION SUBCUTANEOUS
Refills: 0 | Status: DISCONTINUED | OUTPATIENT
Start: 2025-03-21 | End: 2025-03-21

## 2025-03-21 RX ORDER — DEXTROSE 50 % IN WATER 50 %
25 SYRINGE (ML) INTRAVENOUS ONCE
Refills: 0 | Status: DISCONTINUED | OUTPATIENT
Start: 2025-03-21 | End: 2025-03-21

## 2025-03-21 RX ORDER — INSULIN LISPRO 100 U/ML
12 INJECTION, SOLUTION INTRAVENOUS; SUBCUTANEOUS
Refills: 0 | Status: DISCONTINUED | OUTPATIENT
Start: 2025-03-21 | End: 2025-03-21

## 2025-03-21 RX ORDER — INSULIN GLARGINE-YFGN 100 [IU]/ML
30 INJECTION, SOLUTION SUBCUTANEOUS ONCE
Refills: 0 | Status: DISCONTINUED | OUTPATIENT
Start: 2025-03-21 | End: 2025-03-21

## 2025-03-21 RX ORDER — GLUCAGON 3 MG/1
1 POWDER NASAL ONCE
Refills: 0 | Status: DISCONTINUED | OUTPATIENT
Start: 2025-03-21 | End: 2025-03-21

## 2025-03-21 RX ORDER — MIDAZOLAM IN 0.9 % SOD.CHLORID 1 MG/ML
0.02 PLASTIC BAG, INJECTION (ML) INTRAVENOUS
Qty: 100 | Refills: 0 | Status: DISCONTINUED | OUTPATIENT
Start: 2025-03-21 | End: 2025-03-21

## 2025-03-21 RX ORDER — HYPROMELLOSE 0.4 %
1 DROPS OPHTHALMIC (EYE)
Refills: 0 | Status: DISCONTINUED | OUTPATIENT
Start: 2025-03-21 | End: 2025-04-17

## 2025-03-21 RX ORDER — MIDAZOLAM IN 0.9 % SOD.CHLORID 1 MG/ML
0.1 PLASTIC BAG, INJECTION (ML) INTRAVENOUS
Qty: 100 | Refills: 0 | Status: DISCONTINUED | OUTPATIENT
Start: 2025-03-21 | End: 2025-03-23

## 2025-03-21 RX ORDER — PROPOFOL 10 MG/ML
20 INJECTION, EMULSION INTRAVENOUS
Qty: 1000 | Refills: 0 | Status: DISCONTINUED | OUTPATIENT
Start: 2025-03-21 | End: 2025-03-21

## 2025-03-21 RX ORDER — INSULIN LISPRO 100 U/ML
INJECTION, SOLUTION INTRAVENOUS; SUBCUTANEOUS AT BEDTIME
Refills: 0 | Status: DISCONTINUED | OUTPATIENT
Start: 2025-03-21 | End: 2025-03-21

## 2025-03-21 RX ORDER — MIDAZOLAM IN 0.9 % SOD.CHLORID 1 MG/ML
2 PLASTIC BAG, INJECTION (ML) INTRAVENOUS ONCE
Refills: 0 | Status: DISCONTINUED | OUTPATIENT
Start: 2025-03-21 | End: 2025-03-21

## 2025-03-21 RX ORDER — INSULIN LISPRO 100 U/ML
INJECTION, SOLUTION INTRAVENOUS; SUBCUTANEOUS
Refills: 0 | Status: DISCONTINUED | OUTPATIENT
Start: 2025-03-21 | End: 2025-03-21

## 2025-03-21 RX ORDER — PROPOFOL 10 MG/ML
25 INJECTION, EMULSION INTRAVENOUS
Qty: 1000 | Refills: 0 | Status: DISCONTINUED | OUTPATIENT
Start: 2025-03-21 | End: 2025-03-26

## 2025-03-21 RX ORDER — ACETAMINOPHEN 500 MG/5ML
1000 LIQUID (ML) ORAL ONCE
Refills: 0 | Status: COMPLETED | OUTPATIENT
Start: 2025-03-21 | End: 2025-03-21

## 2025-03-21 RX ADMIN — Medication 17.8 MG/KG/HR: at 05:44

## 2025-03-21 RX ADMIN — Medication 1000 MILLIGRAM(S): at 20:35

## 2025-03-21 RX ADMIN — Medication 15 MILLILITER(S): at 05:24

## 2025-03-21 RX ADMIN — PROPOFOL 13.3 MICROGRAM(S)/KG/MIN: 10 INJECTION, EMULSION INTRAVENOUS at 19:56

## 2025-03-21 RX ADMIN — Medication 400 MILLIGRAM(S): at 20:56

## 2025-03-21 RX ADMIN — NOREPINEPHRINE BITARTRATE 167 MICROGRAM(S)/KG/MIN: 8 SOLUTION at 19:57

## 2025-03-21 RX ADMIN — Medication 5 MILLIGRAM(S): at 22:24

## 2025-03-21 RX ADMIN — Medication 4.44 MICROGRAM(S)/KG/HR: at 19:56

## 2025-03-21 RX ADMIN — Medication 1.78 MG/KG/HR: at 01:30

## 2025-03-21 RX ADMIN — Medication 8.88 MG/KG/HR: at 02:03

## 2025-03-21 RX ADMIN — PROPOFOL 16 MICROGRAM(S)/KG/MIN: 10 INJECTION, EMULSION INTRAVENOUS at 02:03

## 2025-03-21 RX ADMIN — Medication 4 MILLIGRAM(S): at 00:53

## 2025-03-21 RX ADMIN — Medication 17.8 MG/KG/HR: at 08:36

## 2025-03-21 RX ADMIN — FOLIC ACID 1 MILLIGRAM(S): 1 TABLET ORAL at 12:22

## 2025-03-21 RX ADMIN — Medication 50 MILLIGRAM(S): at 12:23

## 2025-03-21 RX ADMIN — NOREPINEPHRINE BITARTRATE 167 MICROGRAM(S)/KG/MIN: 8 SOLUTION at 08:36

## 2025-03-21 RX ADMIN — PROPOFOL 13.3 MICROGRAM(S)/KG/MIN: 10 INJECTION, EMULSION INTRAVENOUS at 10:10

## 2025-03-21 RX ADMIN — Medication 17.8 MG/KG/HR: at 19:56

## 2025-03-21 RX ADMIN — Medication 1 APPLICATION(S): at 12:25

## 2025-03-21 RX ADMIN — POLYETHYLENE GLYCOL 3350 17 GRAM(S): 17 POWDER, FOR SOLUTION ORAL at 12:22

## 2025-03-21 RX ADMIN — Medication 4 MILLIGRAM(S): at 01:22

## 2025-03-21 RX ADMIN — TAMSULOSIN HYDROCHLORIDE 0.4 MILLIGRAM(S): 0.4 CAPSULE ORAL at 22:24

## 2025-03-21 RX ADMIN — SODIUM CHLORIDE 100 MILLILITER(S): 9 INJECTION, SOLUTION INTRAVENOUS at 08:36

## 2025-03-21 RX ADMIN — Medication 1 APPLICATION(S): at 18:38

## 2025-03-21 RX ADMIN — OXYBUTYNIN CHLORIDE 5 MILLIGRAM(S): 5 TABLET, FILM COATED, EXTENDED RELEASE ORAL at 02:14

## 2025-03-21 RX ADMIN — LEVETIRACETAM 600 MILLIGRAM(S): 10 INJECTION, SOLUTION INTRAVENOUS at 03:04

## 2025-03-21 RX ADMIN — Medication 100 GRAM(S): at 12:22

## 2025-03-21 RX ADMIN — OXYBUTYNIN CHLORIDE 5 MILLIGRAM(S): 5 TABLET, FILM COATED, EXTENDED RELEASE ORAL at 11:27

## 2025-03-21 RX ADMIN — Medication 4.44 MICROGRAM(S)/KG/HR: at 08:36

## 2025-03-21 RX ADMIN — Medication 2 MILLIGRAM(S): at 01:08

## 2025-03-21 RX ADMIN — LEVETIRACETAM 750 MILLIGRAM(S): 10 INJECTION, SOLUTION INTRAVENOUS at 16:51

## 2025-03-21 RX ADMIN — Medication 15 MILLILITER(S): at 18:38

## 2025-03-21 RX ADMIN — Medication 100 MILLIGRAM(S): at 05:24

## 2025-03-21 NOTE — CONSULT NOTE ADULT - ASSESSMENT
~~~~~~~~~~~~~~~~~~~~~~~~~~~~~~~~~~~~~~~~~~~~~~~~~~~~~~~~~~~  Assessment/Summary:  -44year old man PMHX sickle cell prior acute chest syndrome, iron overload, Gout, HTN and RUE DVT   -admitted 3/15/25 sickle cell crisis, recieved course c/b PEA arrest (rosc ?8minutes)  -Neurology consulted for post anoxic myoclonic jerk. Arrest 3/20/25 prior to 10am had RRT for hypoglycemia resolved with d50 but c/b code blue w/ PEA arrest and ROSC after 4 minutes.  -Vitals (last 24 hours) BP 90s-120s/50s-60s, HR 90s-140s, Afebrile tmax 37.4, RR17-23 satting 93+ on vent save for short periods of hypoxia when clenching on vent.   -Exam  -Labs w/ occasional hypoglycemia responsive to D5, normocytic anemia improved after pRBCs. Marked leukocytosis to 30k (PMN predom) since arrest, MATA, transminiits, elevated tbili, Improved severe lactic acidosis. INR normal prior to arrest, increased to 2 and now downtrending. ptt grossly normal.   -Micro: UA trce  LE, +nit, no WBCs no bacteria. HIV negative.   -CTH (3/20/25 14:00 - only 4 hours post arrest) with loss of gray white diffusely     Impression:  Cardiac arrest leading to PEA arrest and post anoxic myoclonus  Pronogis guarded. Time to ROSC (if actually only 4 minutes) is favorable. Early anoxic injury on CT and early post arrest myoclonus are unfavorable. Exam confounded by sedation is not informative.     Recommendations:  -Video EEG ( to evaluate for focal slowing, epileptiform discharges, or seizures,) , Tele, Fall / seizure / aspiration precautions . Please document detailed descriptions of any and all suspected seizure like activity. if EEG negative for seizures or epileptiform acitivity, wean sedation as tolerated  -MRI brain w/o contrast to evaluate for anoxic brain injury on post arrest day 5 (cortical injury between day 2-7 post-cardiac arrest and subcortical injury day 5-10 post-cardiac arrest and brainstem injury day 7-14)  -Check neuron specific enolase on 24-72hrs post-cardiac arrest  (<30 better prognosis, 30-60 indeterminate, >60 poor prognosis). Please obtain first NSE 24-48 hrs post-cardiac arrest and second 48-72 hrs post-cardiac arrest.   -If able, while on EEG, please avoid sedating agents that can significantly affect EEG, such as benzodiazepines and propofol. Precedex, fentanyl, and hydromorphone are OK  -GOC per primary team    incomplete   [] if in myoclonic status, would start versed gtt at 5mg/hr and increase by 5mg/hr every hour until myoclonus stops or until reaching max dose of 50mg/hr  [] load with depakote __30 mg/kg_, maintenance __10 mg/kg IV q8 hours__. Can also load with keppra 30 mg/kg, maintenance 15 mg/kg IV q12 hours.     Note incomplete; pt to be seen and assessed; plan tentative   Plan NOT YET discussed with attending Assessment/Summary:  -44year old man PMHX sickle cell prior acute chest syndrome, iron overload, Gout, HTN, RUE DVT (was on AC), renal lesion  -admitted 3/15/25 sickle cell crisis, received pRBCs and  s/p L ureteroscopy w/ biopsy and stent placement on 3/19.  -RRT 3/20 9am for ams/hypoglycemia, code blue 3/20 ~930am for PEA arrest attributed to hypoglycemia. Time to rosc from start of compressions 4 minutes but unknown total down time   -3/20 ~1400 Developed myoclonic movements of head, abdomen, LLE starting 3/20 ~1400, increased in frequency overnight into 3/21. Also in MICU hemorrhage shock due to RP bleed s/p embo with IR  -Neurology consulted for post anoxic myoclonic jerks.   -Vitals (last 24 hours) BP 90s-120s/50s-60s, HR 90s-140s, Afebrile tmax 37.4, RR17-23 satting 93+ on vent save for short periods of hypoxia when clenching on vent.   -Exam 3/21 2:00 on sedation (prop@50) comatose w/ myoclonic movements of head, abdomen, LLE worse with noxious. PERRL but loss of other brainstem function (Dolls eyes, absent corneal, absent gag, synchronous with vent)  -Labs w/ occasional hypoglycemia responsive to IV dextrose, normocytic anemia improved after pRBCs. Marked leukocytosis to 30k (PMN predom) since arrest, MATA, transaminitis,, elevated tbili, Improved severe lactic acidosis. INR normal prior to arrest, increased to 2 and now downtrending. ptt grossly normal. Micro: UA trce  LE, +nit, no WBCs no bacteria. HIV negative.   -CTH (3/20/25 14:00 - only 4.5 hours post arrest) with loss of gray white diffusely     Impression:  -PEA arrest in the setting of hypoglycemia leading to anoxic brain injury and post anoxic myoclonic status epilepticus involving jaw and occasionally impeding vent  -Prognosis for meaningful neurological recovery is guarded. Time to ROSC from start of CPR is only 4 minutes but total downtime unknown (<30mins) is overall indeterminant. Early anoxic injury on CT and early post arrest myoclonus are unfavorable. Exam confounded by sedation is not helpful for prognostication.     Recommendations:  Investigations:  -Video EEG ( to evaluate for focal slowing, epileptiform discharges, or seizures,) , Tele, Fall / seizure / aspiration precautions . Please document detailed descriptions of any and all suspected seizure like activity. if EEG negative for seizures or epileptiform activity wean sedation as tolerated.   -MRI brain w/o contrast to evaluate for anoxic brain injury on post arrest day 5 (cortical injury between day 2-7 post-cardiac arrest and subcortical injury day 5-10 post-cardiac arrest and brainstem injury day 7-14)  -Check Neuron Specific Enolase (NSE) on 24-72hrs post-cardiac arrest  (<30 better prognosis, 30-60 indeterminate, >60 poor prognosis).   --Please obtain first NSE 24-48 hrs post-cardiac arrest (Between 930am 3/21 and 930am 3/22)  --Please obtain second NSE 24 hours after drawing first NSE     Management:  -GOC per primary team  -Given myoclonic status impeding vent can consider switching from propofol to midazolam: would start versed gtt at 5mg/hr (or higher if weight based sedation dosing indicates such) and increase by 5mg/hr every hour until myoclonus stops or until reaching max dose of 50mg/hr  -If no improvement with increasing midazolam, -Can load levetiracetam 2500mg (~30mg/kg) and continue 500mg Q8 (based on current CrCl of 48.8ml/min) - if kidney functions dramatically worsens will need dose decrease. (VPA another option but avoiding for now given liver issues)    Plan NOT YET discussed with attending

## 2025-03-21 NOTE — PROGRESS NOTE ADULT - ASSESSMENT
Patient is 44yMale with PMHx of sickle cell disease admitted for anemia concerning for sickle cell crisis s/p ureteroscopy w/ L kidney biopsy, c/c/b PEA arrest of unclear etiology, now intubated and on pressors for possible hemorrhagic shock     NEURO:  #Sedated  - Propofol  - can consider versed pushes for sedation     CV:  #Shock  Suspected hemorrhagic shock s/p kidney biopsy  last dose of lovenox AC on 3/18 at 5PM   Hgb 8 -> ~3   - c/w levophed, map goal >65  - c/w izabel, map goal >65  - Resuscitation 1:1:1 RBC:Plt:FFP  - Coags, trend CBC after transfusion  - Urology f/u   - TTE  - CTPE when can tolerate  - f/u trop, CK     #Hx of DVT  Likely w/ chronic DVT   - CTPE when able  - Can consider heparin gtt IF not suspected for hemorrhagic shock     PULM:  #Acute hypoxic respiratory failure  In the setting of PEA arrest   - Wean vent as tolerated       RENAL:  #MATA  #Hyperkalemia  Prerenal MATA likely iso hemorrhaging   - Dave  - urology contacted  - Trend Cr, uop, lytes  - bicarb drip       GI:  #Transaminitis:   Elevated LFTs    #Diet:  - NPO   - OG access     ENDO:  #DM2: HbA1c   - Mod dose insulin sliding scale       HEMATOLOGIC:  #Shock possibly 2/2 to hemorrhaging  - Resuscitation as above     #Sickle cell crisis  - Daily hgb, retic, hapto, ldh, bili   - heme following       #DVT prophylaxis   - SCD    Endo:  #Hypoglycemia  - D5 w/ bicarb  - BG q1   - No glycemic agents     ID:  - Empiric antibiotics: aztrename, flagyl, x1 vanc  - F/u cultures  Patient is 44yMale with PMHx of sickle cell disease admitted for anemia concerning for sickle cell crisis s/p ureteroscopy w/ L kidney biopsy, c/c/b PEA arrest of unclear etiology, now intubated and on pressors for possible hemorrhagic shock     NEURO:  #Sedated  - Start propofol  - C/w versed  - C/w fent     CV:  #Shock  Suspected hemorrhagic shock s/p kidney biopsy  last dose of lovenox AC on 3/18 at 5PM   Hgb 8 -> ~3   - c/w levophed, map goal >65  - Resuscitation 1:1:1 RBC:Plt:FFP  - Urology f/u     #Hx of DVT  Likely w/ chronic DVT   - CTM  - Bullae present on arm with DVT, CTM     PULM:  #Acute hypoxic respiratory failure  In the setting of PEA arrest   - Wean vent as tolerated       RENAL:  #MATA  #Hyperkalemia  Prerenal MATA likely iso hemorrhaging   Anuria, concerning for acute renal failure   - Dave  - urology contacted  - Trend Cr, uop, lytes  - Stop bicarb drip  - Discuss dialysis with family       GI:  #Shock liver  - CTM     #Diet:  - consider starting tube feeds when less OG tube     ENDO:  #DM2: HbA1c   -    HEMATOLOGIC:  #Shock possibly 2/2 to hemorrhaging  - Resuscitation as above     #Sickle cell crisis  - Daily hgb, retic, hapto, ldh, bili   - heme following       #DVT prophylaxis   - SCD    Endo:  #Hypoglycemia  - D5 w/ bicarb  - BG q1   - No glycemic agents     ID:  - Empiric antibiotics: aztrename, flagyl, x1 vanc  - F/u cultures  Patient is 44yMale with PMHx of sickle cell disease admitted for anemia concerning for sickle cell crisis s/p ureteroscopy w/ L kidney biopsy, c/c/b PEA arrest of unclear etiology, now intubated and on pressors for possible hemorrhagic shock     NEURO:  #Sedated  - Start propofol  - C/w versed  - C/w fent     #Seizures  Witness myoclonic jerking concerning for seizures iso anoxic brain injury  s/p 2.5g keppra load  - c/w keppra 750 per neuro  - F/u neuro recommendations  - Trend enolase   - F/u MRI brain to r/o anoxic brain injury     CV:  #Shock  Suspected hemorrhagic shock s/p kidney biopsy  last dose of lovenox AC on 3/18 at 5PM   Hgb 8 -> ~3   - c/w levophed, map goal >65  - Monitor hgb q8h, can transition to q12 if no concern for active bleeding   - Urology f/u     #Hx of DVT  Likely w/ chronic DVT   - CTM  - Bullae present on arm with DVT, CTM     PULM:  #Acute hypoxic respiratory failure  In the setting of PEA arrest   - Wean vent as tolerated       RENAL:  #MATA  Likely ATN. Currently w/ anuria concerning for acute renal failure   - Dave  - urology contacted  - Trend Cr, uop, lytes  - Stop bicarb drip  - Discuss dialysis with family       GI:  #Shock liver  - CTM     #Diet:  - consider starting tube feeds when less OG tube     HEMATOLOGIC:  #Shock possibly 2/2 to hemorrhaging  - Resuscitation as above     #Sickle cell crisis  - Daily hgb, retic, hapto, ldh, bili   - heme following       #DVT prophylaxis   - SCD    Endo:  #Hypoglycemia  - BG q4  - No glycemic agents     ID:  - Monitor off of abx   - F/u cultures     MSK:  #infiltration  Infiltration of sodium bicarb into left arm, now with arm distenstion and bullae  - CTM  - F/u duplex     Ethics: Full Code  Patient is 44yMale with PMHx of sickle cell disease admitted for anemia concerning for sickle cell crisis s/p ureteroscopy w/ L kidney biopsy, c/c/b PEA arrest of unclear etiology, now intubated and on pressors for possible hemorrhagic shock     NEURO:  #Sedated  - Start propofol  - C/w versed  - C/w fent     #Seizures  Witness myoclonic jerking concerning for seizures iso anoxic brain injury  s/p 2.5g keppra load  - c/w keppra 750 per neuro  - F/u neuro recommendations  - Trend enolase   - F/u MRI brain to r/o anoxic brain injury     CV:  #Shock  Suspected hemorrhagic shock s/p kidney biopsy  last dose of lovenox AC on 3/18 at 5PM   Hgb 8 -> ~3   - c/w levophed, map goal >65  - Monitor hgb q8h, can transition to q12 if no concern for active bleeding   - Urology f/u     #Hx of DVT  Likely w/ chronic DVT   - CTM  - Bullae present on arm with DVT, CTM     PULM:  #Acute hypoxic respiratory failure  In the setting of PEA arrest   - Wean vent as tolerated       RENAL:  #MATA  Likely ATN. Currently w/ anuria concerning for acute renal failure   - Dave  - urology contacted  - Trend Cr, uop, lytes  - Stop bicarb drip  - Discuss dialysis with family       GI:  #Shock liver  - CTM     #Diet:  - consider starting tube feeds when less OG tube     HEMATOLOGIC:  #Shock possibly 2/2 to hemorrhaging  - Resuscitation as above     #Sickle cell crisis  - Daily hgb, retic, hapto, ldh, bili   - heme following       #DVT prophylaxis   - SCD    Endo:  #Hypoglycemia  - BG q4  - No glycemic agents     ID:  - Monitor off of abx   - F/u cultures     MSK:  #infiltration  Infiltration of sodium bicarb into left arm, now with arm distension and bullae  - Vascular surgery consulted for compartment syndrome concern  - CTM  - F/u duplex     Ethics: Full Code

## 2025-03-21 NOTE — PROGRESS NOTE ADULT - SUBJECTIVE AND OBJECTIVE BOX
SUBJECTIVE:  Pt seen and examined bedside.     OBJECTIVE:    Vital Signs Last 24 Hrs  T(C): 37.1 (21 Mar 2025 04:00), Max: 37.4 (20 Mar 2025 18:00)  T(F): 98.8 (21 Mar 2025 04:00), Max: 99.3 (20 Mar 2025 18:00)  HR: 103 (21 Mar 2025 07:23) (99 - 142)  BP: 101/56 (20 Mar 2025 12:00) (91/50 - 107/90)  BP(mean): 67 (20 Mar 2025 12:00) (61 - 98)  RR: 22 (21 Mar 2025 06:00) (17 - 23)  SpO2: 98% (21 Mar 2025 07:23) (84% - 100%)    Parameters below as of 21 Mar 2025 06:00  Patient On (Oxygen Delivery Method): ventilator    O2 Concentration (%): 60    PHYSICAL EXAM:  General: Not acutely distressed  Neurological: sedated  Respiratory: intubated  Abdominal: No palpable thrill  Extremities: Warm, LUE swollen compared to right, scattered clear vesicles along anterior left forearm, compartments soft with appropriate capillary refill, no skin discoloration  Vascular: DP palpable bilaterally, PT dopplerable bilaterally, left radial/ulnar very weakly palpable, however dopplerable along with left palmar arch    MEDICATIONS  (STANDING):  allopurinol 100 milliGRAM(s) Oral daily  aztreonam  IVPB 2000 milliGRAM(s) IV Intermittent <User Schedule>  bisacodyl 5 milliGRAM(s) Oral at bedtime  chlorhexidine 0.12% Liquid 15 milliLiter(s) Oral Mucosa every 12 hours  chlorhexidine 2% Cloths 1 Application(s) Topical daily  deferasirox Tablet 720 milliGRAM(s) Oral daily  dextrose 5% 1000 milliLiter(s) (100 mL/Hr) IV Continuous <Continuous>  fentaNYL   Infusion. 0.5 MICROgram(s)/kG/Hr (4.44 mL/Hr) IV Continuous <Continuous>  folic acid 1 milliGRAM(s) Oral daily  influenza   Vaccine 0.5 milliLiter(s) IntraMuscular once  metroNIDAZOLE  IVPB 500 milliGRAM(s) IV Intermittent every 8 hours  midazolam Infusion. 0.2 mG/kG/Hr (17.8 mL/Hr) IV Continuous <Continuous>  norepinephrine Infusion 1 MICROgram(s)/kG/Min (167 mL/Hr) IV Continuous <Continuous>  oxybutynin 5 milliGRAM(s) Oral every 8 hours  petrolatum Ophthalmic Ointment 1 Application(s) Both EYES two times a day  polyethylene glycol 3350 17 Gram(s) Oral daily  tamsulosin 0.4 milliGRAM(s) Oral at bedtime    MEDICATIONS  (PRN):  aluminum hydroxide/magnesium hydroxide/simethicone Suspension 30 milliLiter(s) Oral every 4 hours PRN Dyspepsia                            8.4    32.50 )-----------( 234      ( 20 Mar 2025 22:47 )             23.5     03-21    138  |  98  |  61[H]  ----------------------------<  102[H]  5.0   |  20[L]  |  2.38[H]    Ca    9.6      21 Mar 2025 00:07  Phos  5.6     03-21  Mg     1.70     03-21    TPro  5.3[L]  /  Alb  3.0[L]  /  TBili  13.8[H]  /  DBili  x   /  AST  671[H]  /  ALT  262[H]  /  AlkPhos  89  03-21    I&O's Detail    20 Mar 2025 07:01  -  21 Mar 2025 07:00  --------------------------------------------------------  IN:    dextrose 5% w/ Additives: 1100 mL    dextrose 5% w/ Additives: 400 mL    FentaNYL: 416.9 mL    IV PiggyBack: 50 mL    IV PiggyBack: 650 mL    Lactated Ringers Bolus: 1000 mL    Midazolam (Status Epilepticus): 39.9 mL    Midazolam (Status Epilepticus): 17.7 mL    Norepinephrine: 650.9 mL    Phenylephrine: 133.2 mL    Plasma: 498 mL    Platelets - Single Donor: 456 mL    PRBCs (Packed Red Blood Cells): 941 mL    Propofol: 53.2 mL    Propofol: 266 mL    Propofol: 26.6 mL    Propofol: 31.8 mL  Total IN: 6731.2 mL    OUT:    Indwelling Catheter - Urethral (mL): 25 mL    Nasogastric/Oral tube (mL): 575 mL  Total OUT: 600 mL    Total NET: 6131.2 mL       SUBJECTIVE:  Pt seen and examined bedside.     OBJECTIVE:    Vital Signs Last 24 Hrs  T(C): 37.1 (21 Mar 2025 04:00), Max: 37.4 (20 Mar 2025 18:00)  T(F): 98.8 (21 Mar 2025 04:00), Max: 99.3 (20 Mar 2025 18:00)  HR: 103 (21 Mar 2025 07:23) (99 - 142)  BP: 101/56 (20 Mar 2025 12:00) (91/50 - 107/90)  BP(mean): 67 (20 Mar 2025 12:00) (61 - 98)  RR: 22 (21 Mar 2025 06:00) (17 - 23)  SpO2: 98% (21 Mar 2025 07:23) (84% - 100%)    Parameters below as of 21 Mar 2025 06:00  Patient On (Oxygen Delivery Method): ventilator    O2 Concentration (%): 60    PHYSICAL EXAM:  General: Not acutely distressed  Neurological: sedated  Respiratory: intubated  Abdominal: No palpable thrill  Extremities: Warm, LUE swollen compared to right, scattered clear vesicles along anterior left forearm, compartments soft with appropriate capillary refill, no skin discoloration  Vascular: DP palpable bilaterally, PT dopplerable bilaterally, left radial/ulnar very weakly palpable, however dopplerable along with left palmar arch    MEDICATIONS  (STANDING):  allopurinol 100 milliGRAM(s) Oral daily  aztreonam  IVPB 2000 milliGRAM(s) IV Intermittent <User Schedule>  bisacodyl 5 milliGRAM(s) Oral at bedtime  chlorhexidine 0.12% Liquid 15 milliLiter(s) Oral Mucosa every 12 hours  chlorhexidine 2% Cloths 1 Application(s) Topical daily  deferasirox Tablet 720 milliGRAM(s) Oral daily  dextrose 5% 1000 milliLiter(s) (100 mL/Hr) IV Continuous <Continuous>  fentaNYL   Infusion. 0.5 MICROgram(s)/kG/Hr (4.44 mL/Hr) IV Continuous <Continuous>  folic acid 1 milliGRAM(s) Oral daily  influenza   Vaccine 0.5 milliLiter(s) IntraMuscular once  metroNIDAZOLE  IVPB 500 milliGRAM(s) IV Intermittent every 8 hours  midazolam Infusion. 0.2 mG/kG/Hr (17.8 mL/Hr) IV Continuous <Continuous>  norepinephrine Infusion 1 MICROgram(s)/kG/Min (167 mL/Hr) IV Continuous <Continuous>  oxybutynin 5 milliGRAM(s) Oral every 8 hours  petrolatum Ophthalmic Ointment 1 Application(s) Both EYES two times a day  polyethylene glycol 3350 17 Gram(s) Oral daily  tamsulosin 0.4 milliGRAM(s) Oral at bedtime    MEDICATIONS  (PRN):  aluminum hydroxide/magnesium hydroxide/simethicone Suspension 30 milliLiter(s) Oral every 4 hours PRN Dyspepsia                            8.4    32.50 )-----------( 234      ( 20 Mar 2025 22:47 )             23.5     03-21    138  |  98  |  61[H]  ----------------------------<  102[H]  5.0   |  20[L]  |  2.38[H]    Ca    9.6      21 Mar 2025 00:07  Phos  5.6     03-21  Mg     1.70     03-21    TPro  5.3[L]  /  Alb  3.0[L]  /  TBili  13.8[H]  /  DBili  x   /  AST  671[H]  /  ALT  262[H]  /  AlkPhos  89  03-21    I&O's Detail    20 Mar 2025 07:01  -  21 Mar 2025 07:00  --------------------------------------------------------  IN:    dextrose 5% w/ Additives: 1100 mL    dextrose 5% w/ Additives: 400 mL    FentaNYL: 416.9 mL    IV PiggyBack: 50 mL    IV PiggyBack: 650 mL    Lactated Ringers Bolus: 1000 mL    Midazolam (Status Epilepticus): 39.9 mL    Midazolam (Status Epilepticus): 17.7 mL    Norepinephrine: 650.9 mL    Phenylephrine: 133.2 mL    Plasma: 498 mL    Platelets - Single Donor: 456 mL    PRBCs (Packed Red Blood Cells): 941 mL    Propofol: 53.2 mL    Propofol: 266 mL    Propofol: 26.6 mL    Propofol: 31.8 mL  Total IN: 6731.2 mL    OUT:    Indwelling Catheter - Urethral (mL): 25 mL    Nasogastric/Oral tube (mL): 575 mL  Total OUT: 600 mL    Total NET: 6131.2 mL    Physical exam:  Extremities: diffuse swelling of all 4 extremties. Warm, LUE swollen compared to right, scattered clear blisters along anterior left forearm, compartments soft with appropriate capillary refill, no skin discoloration  Vascular: DP palpable bilaterally, PT dopplerable bilaterally, left radial/ulnar very weakly palpable, however dopplerable along with left palmar arch

## 2025-03-21 NOTE — PROGRESS NOTE ADULT - ASSESSMENT
44y M s/p  L ureteroscopy, RGP, biopsy, stent placement L kidney wash, L kidney lower pole mass biopsy on 3/19, RRT and code blue 3/20 now in MICU intubated and sedated on pressors with myoclonic activity and signs of anoxic brain injury on [facility] 3/20/25.    3/19: s/p L ureteroscopy, RGP, biopsy, stent placement L kidney wash, L kidney lower pole mass biopsy  3/20: RRT 9a and Code Blue 930a, ROSC achieved in 4 minutes, transferred to MICU intubated and sedated on pressors s/p several pRBC, PLT, and plasma transfusions for Hgb 3.4  3/21: Hgb 8.4    Recommendations:  - Continue daniel catheter, monitor I/O's > low UOP with MATA likely iso hypovolemic shock  - Trend H/H, Cr, WBC, lactate  - Appreciate neuro recommendations  - Appreciate excellent MICU care    D/w Dr. Rosey [facility] [place] [facility]  [address] [place], [place] [id number] [phone]

## 2025-03-21 NOTE — PROGRESS NOTE ADULT - SUBJECTIVE AND OBJECTIVE BOX
44y Male s/p Left renal angiogram and embolization on 3/20/25 in Interventional Radiology.     Patient seen and examined at bedside.  Intubated/Sedated.    Right Groin area dry and intact, no hematoma no redness noted.    T(F): 99.3 (03-21-25 @ 08:00), Max: 99.3 (03-20-25 @ 18:00)  HR: 99 (03-21-25 @ 11:20) (99 - 142)  BP: 101/56 (03-20-25 @ 12:00) (101/56 - 101/56)  RR: 19 (03-21-25 @ 11:20) (17 - 31)  SpO2: 96% (03-21-25 @ 11:20) (84% - 100%)    LABS:                        8.2    25.35 )-----------( 186      ( 21 Mar 2025 10:00 )             22.5     03-21    138  |  98  |  61[H]  ----------------------------<  102[H]  5.0   |  20[L]  |  2.38[H]    Ca    9.6      21 Mar 2025 00:07  Phos  5.6     03-21  Mg     1.70     03-21    TPro  5.3[L]  /  Alb  3.0[L]  /  TBili  13.8[H]  /  DBili  x   /  AST  671[H]  /  ALT  262[H]  /  AlkPhos  89  03-21    PT/INR - ( 20 Mar 2025 22:47 )   PT: 17.5 sec;   INR: 1.51 ratio         PTT - ( 20 Mar 2025 22:47 )  PTT:31.6 sec  I&O's Detail    20 Mar 2025 07:01  -  21 Mar 2025 07:00  --------------------------------------------------------  IN:    dextrose 5% w/ Additives: 1100 mL    dextrose 5% w/ Additives: 400 mL    FentaNYL: 416.9 mL    IV PiggyBack: 50 mL    IV PiggyBack: 650 mL    Lactated Ringers Bolus: 1000 mL    Midazolam (Status Epilepticus): 39.9 mL    Midazolam (Status Epilepticus): 17.7 mL    Norepinephrine: 650.9 mL    Phenylephrine: 133.2 mL    Plasma: 498 mL    Platelets - Single Donor: 456 mL    PRBCs (Packed Red Blood Cells): 941 mL    Propofol: 53.2 mL    Propofol: 266 mL    Propofol: 26.6 mL    Propofol: 31.8 mL  Total IN: 6731.2 mL    OUT:    Indwelling Catheter - Urethral (mL): 25 mL    Nasogastric/Oral tube (mL): 575 mL  Total OUT: 600 mL    Total NET: 6131.2 mL      21 Mar 2025 07:01  -  21 Mar 2025 11:34  --------------------------------------------------------  IN:    dextrose 5% w/ Additives: 200 mL    FentaNYL: 70 mL    Midazolam (Status Epilepticus): 35.4 mL    Norepinephrine: 1.7 mL  Total IN: 307.1 mL    OUT:    Indwelling Catheter - Urethral (mL): 5 mL  Total OUT: 5 mL    Total NET: 302.1 mL    PHYSICAL EXAM:  General: Nontoxic, resting comfortably in bed, NAD  Right groin: Dressing C/D/I. No pain upon palpation. No hematoma, swelling or active bleeding noted. DP pulse palpated, 2+.            44y Male s/p Left renal angiogram and embolization on 3/20/25 in Interventional Radiology.     Patient seen and examined at bedside.  Intubated/Sedated.    Right Groin area dry and intact, no hematoma no redness noted.    T(F): 99.3 (03-21-25 @ 08:00), Max: 99.3 (03-20-25 @ 18:00)  HR: 99 (03-21-25 @ 11:20) (99 - 142)  BP: 101/56 (03-20-25 @ 12:00) (101/56 - 101/56)  RR: 19 (03-21-25 @ 11:20) (17 - 31)  SpO2: 96% (03-21-25 @ 11:20) (84% - 100%)    LABS:                        8.2    25.35 )-----------( 186      ( 21 Mar 2025 10:00 )             22.5     03-21    138  |  98  |  61[H]  ----------------------------<  102[H]  5.0   |  20[L]  |  2.38[H]    Ca    9.6      21 Mar 2025 00:07  Phos  5.6     03-21  Mg     1.70     03-21    TPro  5.3[L]  /  Alb  3.0[L]  /  TBili  13.8[H]  /  DBili  x   /  AST  671[H]  /  ALT  262[H]  /  AlkPhos  89  03-21    PT/INR - ( 20 Mar 2025 22:47 )   PT: 17.5 sec;   INR: 1.51 ratio         PTT - ( 20 Mar 2025 22:47 )  PTT:31.6 sec  I&O's Detail    20 Mar 2025 07:01  -  21 Mar 2025 07:00  --------------------------------------------------------  IN:    dextrose 5% w/ Additives: 1100 mL    dextrose 5% w/ Additives: 400 mL    FentaNYL: 416.9 mL    IV PiggyBack: 50 mL    IV PiggyBack: 650 mL    Lactated Ringers Bolus: 1000 mL    Midazolam (Status Epilepticus): 39.9 mL    Midazolam (Status Epilepticus): 17.7 mL    Norepinephrine: 650.9 mL    Phenylephrine: 133.2 mL    Plasma: 498 mL    Platelets - Single Donor: 456 mL    PRBCs (Packed Red Blood Cells): 941 mL    Propofol: 53.2 mL    Propofol: 266 mL    Propofol: 26.6 mL    Propofol: 31.8 mL  Total IN: 6731.2 mL    OUT:    Indwelling Catheter - Urethral (mL): 25 mL    Nasogastric/Oral tube (mL): 575 mL  Total OUT: 600 mL    Total NET: 6131.2 mL      21 Mar 2025 07:01  -  21 Mar 2025 11:34  --------------------------------------------------------  IN:    dextrose 5% w/ Additives: 200 mL    FentaNYL: 70 mL    Midazolam (Status Epilepticus): 35.4 mL    Norepinephrine: 1.7 mL  Total IN: 307.1 mL    OUT:    Indwelling Catheter - Urethral (mL): 5 mL  Total OUT: 5 mL    Total NET: 302.1 mL    PHYSICAL EXAM:  General: Nontoxic, resting comfortably in bed, NAD  Right groin: Dressing C/D/I. No hematoma, swelling or active bleeding noted. DP pulse palpated, 2+.

## 2025-03-21 NOTE — PROGRESS NOTE ADULT - SUBJECTIVE AND OBJECTIVE BOX
Patient is a 44y old  Male who presents with a chief complaint of Referred by Hematologist for low Hg (21 Mar 2025 11:18)  s/p  L ureteroscopy, RGP, biopsy, stent placement L kidney wash, L kidney lower pole mass biopsy on 3/19, RRT and code blue 3/20 now in MICU intubated and sedated on pressors with myoclonic activity and signs of anoxic brain injury on [facility] 3/20/25.      MEDICATIONS  (STANDING):  allopurinol 50 milliGRAM(s) Oral <User Schedule>  bisacodyl 5 milliGRAM(s) Oral at bedtime  chlorhexidine 0.12% Liquid 15 milliLiter(s) Oral Mucosa every 12 hours  chlorhexidine 2% Cloths 1 Application(s) Topical daily  fentaNYL   Infusion. 0.5 MICROgram(s)/kG/Hr (4.44 mL/Hr) IV Continuous <Continuous>  folic acid 1 milliGRAM(s) Oral daily  influenza   Vaccine 0.5 milliLiter(s) IntraMuscular once  magnesium sulfate  IVPB 1 Gram(s) IV Intermittent once  midazolam Infusion. 0.2 mG/kG/Hr (17.8 mL/Hr) IV Continuous <Continuous>  norepinephrine Infusion 1 MICROgram(s)/kG/Min (167 mL/Hr) IV Continuous <Continuous>  oxybutynin 5 milliGRAM(s) Oral every 8 hours  petrolatum Ophthalmic Ointment 1 Application(s) Both EYES two times a day  polyethylene glycol 3350 17 Gram(s) Oral daily  propofol Infusion 25 MICROgram(s)/kG/Min (13.3 mL/Hr) IV Continuous <Continuous>  tamsulosin 0.4 milliGRAM(s) Oral at bedtime    MEDICATIONS  (PRN):  aluminum hydroxide/magnesium hydroxide/simethicone Suspension 30 milliLiter(s) Oral every 4 hours PRN Dyspepsia          Vital Signs Last 24 Hrs  T(C): 37.4 (21 Mar 2025 08:00), Max: 37.4 (20 Mar 2025 18:00)  T(F): 99.3 (21 Mar 2025 08:00), Max: 99.3 (20 Mar 2025 18:00)  HR: 99 (21 Mar 2025 11:20) (99 - 142)  BP: 101/56 (20 Mar 2025 12:00) (101/56 - 101/56)  BP(mean): 67 (20 Mar 2025 12:00) (67 - 67)  RR: 19 (21 Mar 2025 11:20) (17 - 31)  SpO2: 96% (21 Mar 2025 11:20) (84% - 100%)    Parameters below as of 21 Mar 2025 11:00  Patient On (Oxygen Delivery Method): ventilator    O2 Concentration (%): 60    PE  intubated and sedated, on pressors  Anicteric, MMM  RRR  CTAB  Abd soft, NT, ND  No c/c/e  No rash grossly                            8.2    25.35 )-----------( 186      ( 21 Mar 2025 10:00 )             22.5       03-21    138  |  98  |  61[H]  ----------------------------<  102[H]  5.0   |  20[L]  |  2.38[H]    Ca    9.6      21 Mar 2025 00:07  Phos  5.6     03-21  Mg     1.70     03-21    TPro  5.3[L]  /  Alb  3.0[L]  /  TBili  13.8[H]  /  DBili  x   /  AST  671[H]  /  ALT  262[H]  /  AlkPhos  89  03-21

## 2025-03-21 NOTE — PROGRESS NOTE ADULT - ASSESSMENT
44y Male with pmhx of SCD, UE DVT on Eliquis HTN, and L renal mass admitted initially for transfusion/treatment of sickle cell crisis now s/p cystoscopy with L uteroscopy + biopsy on 3/19. On 3/20/25 RRT was called for AMS, patient found to be hypoglycemic. 30 minutes later patient was found unresponsive and ROSC was achieved 4 min after start of compressions. Patient was upgraded to MICU. CT abdomen and pelvis on 3/20/25 showed left perirenal and subcapsular hemorrhage without foci of active extravasation. IR consulted for renal angio with possible embolization. Now s/p Left renal angiogram and embolization on 3/20/25 in Interventional Radiology.       Plan:  - Keep  dressing c/d/i  - Monitor H/H    [id number]   44y Male with pmhx of SCD, UE DVT on Eliquis HTN, and L renal mass admitted initially for transfusion/treatment of sickle cell crisis now s/p cystoscopy with L uteroscopy + biopsy on 3/19. On 3/20/25 RRT was called for AMS, patient found to be hypoglycemic. 30 minutes later patient was found unresponsive and ROSC was achieved 4 min after start of compressions. Patient was upgraded to MICU. CT abdomen and pelvis on 3/20/25 showed left perirenal and subcapsular hemorrhage without foci of active extravasation. IR consulted for renal angio with possible embolization. Now s/p Left renal angiogram and embolization on 3/20/25 in Interventional Radiology.     Plan:  - Keep  dressing c/d/i  - Monitor H/H    [id number]

## 2025-03-21 NOTE — PROGRESS NOTE ADULT - ASSESSMENT
44M pw sickle cell crisis s/p left renal bx cb hemorrhagic shock and PEA arrest s/p ROSC and IR embolization of left renal artery.    Recommendations:  -f/u LUE duplex  -recommend hand surgery consult      C Team/Vascular Surgery  Please page [phone] for all questions. 44M pw sickle cell crisis s/p left renal bx cb hemorrhagic shock and PEA arrest s/p ROSC and IR embolization of left renal artery. Consulted for LUE arm swelling, concern for compartment syndrome. L arm soft to exam with weakly palpable radial pulse. capillary refill. However, unable to evaluate motor/sensory due to sedation. but low concern for compartment syndrome.    Recommendations:  - no acute vascular intervention needed at this time  -recommend hand surgery consult  - please continue arm elevation and local wound care       Thank you for your consult  C Team/Vascular Surgery  Please page [phone] for all questions.

## 2025-03-21 NOTE — PROGRESS NOTE ADULT - SUBJECTIVE AND OBJECTIVE BOX
MICU Progress Note    SUBJECTIVE  INTERVAL EVENTS:  - NAEON    OBJECTIVE  Physical Exam:   PHYSICAL EXAM:  GENERAL: Sedated  HEAD:  Atraumatic, Normocephalic  EYES: EOMI, PERRLA, conjunctiva and sclera clear  NECK: Supple, no lymphadenopathy, no JVD  CHEST/LUNG: CTAB; No wheezes, rales, or rhonchi  HEART: Regular rate and rhythm. Normal S1/S2. No murmurs, rubs, or gallops  ABDOMEN: Soft, non-tender, non-distended; normal bowel sounds, no organomegaly  EXTREMITIES:  2+ peripheral pulses b/l, No clubbing, cyanosis, or edema  NEUROLOGY: Sedated   SKIN: No rashes or lesions    ICU Vital Signs Last 24 Hrs  T(F): 98.8 (21 Mar 2025 04:00), Max: 99.3 (20 Mar 2025 18:00)  HR: 99 (21 Mar 2025 06:00) (99 - 142)  BP: 101/56 (20 Mar 2025 12:00) (91/50 - 107/90)  BP(mean): 67 (20 Mar 2025 12:00) (61 - 98)  ABP: 100/63 (21 Mar 2025 06:00) (100/59 - 173/59)  ABP(mean): 74 (21 Mar 2025 06:00) (68 - 111)  RR: 22 (21 Mar 2025 06:00) (17 - 23)  SpO2: 100% (21 Mar 2025 06:00) (84% - 100%)    I&O's Summary    20 Mar 2025 07:01  -  21 Mar 2025 07:00  --------------------------------------------------------  IN: 6731.2 mL / OUT: 600 mL / NET: 6131.2 mL      Mode: AC/ CMV (Assist Control/ Continuous Mandatory Ventilation)  RR (machine): 20  TV (machine): 550  FiO2: 60  PEEP: 6  ITime: 0.86  MAP: 15  PIP: 34    ECMO Day#     Adult Advanced Hemodynamics Last 24 Hrs  CVP(mm Hg): --  CVP(cm H2O): --  CO: --  CI: --  PA: --  PA(mean): --  PCWP: --  SVR: --  SVRI: --  PVR: --  PVRI: --    ECMO SETTINGS:  Type:		[ ] Venovenous		[ ] Venoarterial  Cannulation Site(s):     Flow:  	        RPM: 		    Oxygenator:	Sweep:     L/min	FiO2:        LABS:                        8.4    32.50 )-----------( 234      ( 20 Mar 2025 22:47 )             23.5     03-21    138  |  98  |  61[H]  ----------------------------<  102[H]  5.0   |  20[L]  |  2.38[H]    Ca    9.6      21 Mar 2025 00:07  Phos  5.6     03-21  Mg     1.70     03-21    TPro  5.3[L]  /  Alb  3.0[L]  /  TBili  13.8[H]  /  DBili  x   /  AST  671[H]  /  ALT  262[H]  /  AlkPhos  89  03-21    PT/INR - ( 20 Mar 2025 22:47 )   PT: 17.5 sec;   INR: 1.51 ratio         PTT - ( 20 Mar 2025 22:47 )  PTT:31.6 sec  ABG - ( 20 Mar 2025 22:47 )  pH, Arterial: 7.43  pH, Blood: x     /  pCO2: 37    /  pO2: 119   / HCO3: 25    / Base Excess: 0.3   /  SaO2: 99.8                Venous: 03-20-25 @ 10:04 FiO2: -- Oxygen Sat% 68.5    Urinalysis Basic - ( 21 Mar 2025 00:07 )    Color: x / Appearance: x / SG: x / pH: x  Gluc: 102 mg/dL / Ketone: x  / Bili: x / Urobili: x   Blood: x / Protein: x / Nitrite: x   Leuk Esterase: x / RBC: x / WBC x   Sq Epi: x / Non Sq Epi: x / Bacteria: x      CARDIAC MARKERS ( 20 Mar 2025 10:04 )  x     / x     / x     / x     / 1.1 ng/mL      CAPILLARY BLOOD GLUCOSE      POCT Blood Glucose.: 124 mg/dL (21 Mar 2025 05:40)  POCT Blood Glucose.: 125 mg/dL (20 Mar 2025 23:05)  POCT Blood Glucose.: 209 mg/dL (20 Mar 2025 18:00)  POCT Blood Glucose.: 259 mg/dL (20 Mar 2025 15:21)  POCT Blood Glucose.: 130 mg/dL (20 Mar 2025 14:31)  POCT Blood Glucose.: 101 mg/dL (20 Mar 2025 13:33)  POCT Blood Glucose.: 77 mg/dL (20 Mar 2025 12:39)  POCT Blood Glucose.: 79 mg/dL (20 Mar 2025 11:12)  POCT Blood Glucose.: 173 mg/dL (20 Mar 2025 10:00)  POCT Blood Glucose.: 49 mg/dL (20 Mar 2025 09:18)  POCT Blood Glucose.: 48 mg/dL (20 Mar 2025 09:17)  POCT Blood Glucose.: 51 mg/dL (20 Mar 2025 09:02)        RADIOLOGY & ADDITIONAL TESTS:  Other Labs  Imaging Personally Reviewed: No interval imaging  Electrocardiogram Personally Reviewed: No interval imaging    Medications and Allergies:   MEDICATIONS  (STANDING):  allopurinol 100 milliGRAM(s) Oral daily  aztreonam  IVPB 2000 milliGRAM(s) IV Intermittent <User Schedule>  bisacodyl 5 milliGRAM(s) Oral at bedtime  chlorhexidine 0.12% Liquid 15 milliLiter(s) Oral Mucosa every 12 hours  chlorhexidine 2% Cloths 1 Application(s) Topical daily  deferasirox Tablet 720 milliGRAM(s) Oral daily  dextrose 5% 1000 milliLiter(s) (100 mL/Hr) IV Continuous <Continuous>  fentaNYL   Infusion. 0.5 MICROgram(s)/kG/Hr (4.44 mL/Hr) IV Continuous <Continuous>  folic acid 1 milliGRAM(s) Oral daily  influenza   Vaccine 0.5 milliLiter(s) IntraMuscular once  metroNIDAZOLE  IVPB 500 milliGRAM(s) IV Intermittent every 8 hours  midazolam Infusion. 0.2 mG/kG/Hr (17.8 mL/Hr) IV Continuous <Continuous>  norepinephrine Infusion 1 MICROgram(s)/kG/Min (167 mL/Hr) IV Continuous <Continuous>  oxybutynin 5 milliGRAM(s) Oral every 8 hours  petrolatum Ophthalmic Ointment 1 Application(s) Both EYES two times a day  polyethylene glycol 3350 17 Gram(s) Oral daily  tamsulosin 0.4 milliGRAM(s) Oral at bedtime    MEDICATIONS  (PRN):  aluminum hydroxide/magnesium hydroxide/simethicone Suspension 30 milliLiter(s) Oral every 4 hours PRN Dyspepsia      Antimicrobials  aztreonam  IVPB 2000 milliGRAM(s) IV Intermittent <User Schedule>, Stop order after: 7 Days  metroNIDAZOLE  IVPB 500 milliGRAM(s) IV Intermittent every 8 hours    vancomycin  IVPB 1000 milliGRAM(s) IV Intermittent once, 03-20-25 @ 11:53, STAT, Stop order after: 1 Doses      aztreonam  IVPB 2000 milliGRAM(s) IV Intermittent <User Schedule>, Stop order after: 7 Days  metroNIDAZOLE  IVPB 500 milliGRAM(s) IV Intermittent every 8 hours      Allergies    penicillin (Pruritus)  hydroxyurea (Other)  piperacillin-tazobactam (Urticaria)  ceftriaxone (Anaphylaxis)    Intolerances     MICU Progress Note    SUBJECTIVE  INTERVAL EVENTS:  - s/p IR procedure embolization; hgb improving  - Worsening myoclonic jerking, neuro consulted now on versed gtt and s/p keppra load   - Bullae on left arm, vascular surgery consulted     OBJECTIVE  Physical Exam:   PHYSICAL EXAM:  GENERAL: Sedated  HEAD:  Atraumatic, Normocephalic  EYES: Constricted, reactive   NECK: Supple, no lymphadenopathy, no JVD  CHEST/LUNG: CTAB; No wheezes, rales, or rhonchi  HEART: Regular rate and rhythm. Normal S1/S2. No murmurs, rubs, or gallops  ABDOMEN: OG tube with dark output; soft, non-tender, non-distended; normal bowel sounds, no organomegaly  : Dave with dark brown urine at bedside  EXTREMITIES: 1+ pitting edema b/l   NEUROLOGY: Sedated   SKIN: No rashes or lesions    ICU Vital Signs Last 24 Hrs  T(F): 98.8 (21 Mar 2025 04:00), Max: 99.3 (20 Mar 2025 18:00)  HR: 99 (21 Mar 2025 06:00) (99 - 142)  BP: 101/56 (20 Mar 2025 12:00) (91/50 - 107/90)  BP(mean): 67 (20 Mar 2025 12:00) (61 - 98)  ABP: 100/63 (21 Mar 2025 06:00) (100/59 - 173/59)  ABP(mean): 74 (21 Mar 2025 06:00) (68 - 111)  RR: 22 (21 Mar 2025 06:00) (17 - 23)  SpO2: 100% (21 Mar 2025 06:00) (84% - 100%)    I&O's Summary    20 Mar 2025 07:01  -  21 Mar 2025 07:00  --------------------------------------------------------  IN: 6731.2 mL / OUT: 600 mL / NET: 6131.2 mL      Mode: AC/ CMV (Assist Control/ Continuous Mandatory Ventilation)  RR (machine): 20  TV (machine): 550  FiO2: 60  PEEP: 6  ITime: 0.86  MAP: 15  PIP: 34    ECMO Day#     Adult Advanced Hemodynamics Last 24 Hrs  CVP(mm Hg): --  CVP(cm H2O): --  CO: --  CI: --  PA: --  PA(mean): --  PCWP: --  SVR: --  SVRI: --  PVR: --  PVRI: --    ECMO SETTINGS:  Type:		[ ] Venovenous		[ ] Venoarterial  Cannulation Site(s):     Flow:  	        RPM: 		    Oxygenator:	Sweep:     L/min	FiO2:        LABS:                        8.4    32.50 )-----------( 234      ( 20 Mar 2025 22:47 )             23.5     03-21    138  |  98  |  61[H]  ----------------------------<  102[H]  5.0   |  20[L]  |  2.38[H]    Ca    9.6      21 Mar 2025 00:07  Phos  5.6     03-21  Mg     1.70     03-21    TPro  5.3[L]  /  Alb  3.0[L]  /  TBili  13.8[H]  /  DBili  x   /  AST  671[H]  /  ALT  262[H]  /  AlkPhos  89  03-21    PT/INR - ( 20 Mar 2025 22:47 )   PT: 17.5 sec;   INR: 1.51 ratio         PTT - ( 20 Mar 2025 22:47 )  PTT:31.6 sec  ABG - ( 20 Mar 2025 22:47 )  pH, Arterial: 7.43  pH, Blood: x     /  pCO2: 37    /  pO2: 119   / HCO3: 25    / Base Excess: 0.3   /  SaO2: 99.8                Venous: 03-20-25 @ 10:04 FiO2: -- Oxygen Sat% 68.5    Urinalysis Basic - ( 21 Mar 2025 00:07 )    Color: x / Appearance: x / SG: x / pH: x  Gluc: 102 mg/dL / Ketone: x  / Bili: x / Urobili: x   Blood: x / Protein: x / Nitrite: x   Leuk Esterase: x / RBC: x / WBC x   Sq Epi: x / Non Sq Epi: x / Bacteria: x      CARDIAC MARKERS ( 20 Mar 2025 10:04 )  x     / x     / x     / x     / 1.1 ng/mL      CAPILLARY BLOOD GLUCOSE      POCT Blood Glucose.: 124 mg/dL (21 Mar 2025 05:40)  POCT Blood Glucose.: 125 mg/dL (20 Mar 2025 23:05)  POCT Blood Glucose.: 209 mg/dL (20 Mar 2025 18:00)  POCT Blood Glucose.: 259 mg/dL (20 Mar 2025 15:21)  POCT Blood Glucose.: 130 mg/dL (20 Mar 2025 14:31)  POCT Blood Glucose.: 101 mg/dL (20 Mar 2025 13:33)  POCT Blood Glucose.: 77 mg/dL (20 Mar 2025 12:39)  POCT Blood Glucose.: 79 mg/dL (20 Mar 2025 11:12)  POCT Blood Glucose.: 173 mg/dL (20 Mar 2025 10:00)  POCT Blood Glucose.: 49 mg/dL (20 Mar 2025 09:18)  POCT Blood Glucose.: 48 mg/dL (20 Mar 2025 09:17)  POCT Blood Glucose.: 51 mg/dL (20 Mar 2025 09:02)        RADIOLOGY & ADDITIONAL TESTS:  Other Labs  Imaging Personally Reviewed: No interval imaging  Electrocardiogram Personally Reviewed: No interval imaging    Medications and Allergies:   MEDICATIONS  (STANDING):  allopurinol 100 milliGRAM(s) Oral daily  aztreonam  IVPB 2000 milliGRAM(s) IV Intermittent <User Schedule>  bisacodyl 5 milliGRAM(s) Oral at bedtime  chlorhexidine 0.12% Liquid 15 milliLiter(s) Oral Mucosa every 12 hours  chlorhexidine 2% Cloths 1 Application(s) Topical daily  deferasirox Tablet 720 milliGRAM(s) Oral daily  dextrose 5% 1000 milliLiter(s) (100 mL/Hr) IV Continuous <Continuous>  fentaNYL   Infusion. 0.5 MICROgram(s)/kG/Hr (4.44 mL/Hr) IV Continuous <Continuous>  folic acid 1 milliGRAM(s) Oral daily  influenza   Vaccine 0.5 milliLiter(s) IntraMuscular once  metroNIDAZOLE  IVPB 500 milliGRAM(s) IV Intermittent every 8 hours  midazolam Infusion. 0.2 mG/kG/Hr (17.8 mL/Hr) IV Continuous <Continuous>  norepinephrine Infusion 1 MICROgram(s)/kG/Min (167 mL/Hr) IV Continuous <Continuous>  oxybutynin 5 milliGRAM(s) Oral every 8 hours  petrolatum Ophthalmic Ointment 1 Application(s) Both EYES two times a day  polyethylene glycol 3350 17 Gram(s) Oral daily  tamsulosin 0.4 milliGRAM(s) Oral at bedtime    MEDICATIONS  (PRN):  aluminum hydroxide/magnesium hydroxide/simethicone Suspension 30 milliLiter(s) Oral every 4 hours PRN Dyspepsia      Antimicrobials  aztreonam  IVPB 2000 milliGRAM(s) IV Intermittent <User Schedule>, Stop order after: 7 Days  metroNIDAZOLE  IVPB 500 milliGRAM(s) IV Intermittent every 8 hours    vancomycin  IVPB 1000 milliGRAM(s) IV Intermittent once, 03-20-25 @ 11:53, STAT, Stop order after: 1 Doses      aztreonam  IVPB 2000 milliGRAM(s) IV Intermittent <User Schedule>, Stop order after: 7 Days  metroNIDAZOLE  IVPB 500 milliGRAM(s) IV Intermittent every 8 hours      Allergies    penicillin (Pruritus)  hydroxyurea (Other)  piperacillin-tazobactam (Urticaria)  ceftriaxone (Anaphylaxis)    Intolerances

## 2025-03-21 NOTE — PROGRESS NOTE ADULT - ASSESSMENT
This is a 44 year old male with sickle cell disease who presents with low hemoglobin.  s/p left renal bx with stent placement 3/19  1. Sickle cell anemia  -- Baseline hemoglobin outpatient ~6.0, sent in for hemoglobin 5.3   -- Labs consistent with hemolysis- elevated bili though improving,  LDH 1121 which is downtrending  -- CXR neg. CTA chest w/o PE though RUL opacity. Patient is asymptomatic and saturating well on room air.   -- Continue with folic acid, encourage hydration, optimize pain control on IV PCA   -- Monitor CBC and transfuse to maintain hg >6. s/p 1 unit pRBC, some improvement but would recommend additional 2 units pRBC    2. R IJ DVT   -- Dx 12/2024, currently on therapeutic Lovenox , now holding  -- Rec repeat US to evaluate clot, and if neg then RUE may be used for access   -- Pt will continue to need at least 6 months of anticoagulation    3. Back pain   -- Typical of his sickle cell pain   -- Outpatient he is on oxycodone 30mg PO q4h      4. Iron overload   -- Cont Jadenu    5. r/o malignancy   -- Prior imaging with concern for urothelial malignancy   -- s/p  L ureteroscopy, RGP, biopsy, stent placement L kidney wash, L kidney lower pole mass biopsy 3/19, will follow pathology      6. RRT  --s/p rapid response for hypoglycemia this AM, and then  at approximately 9:30AM a code blue was called when pt was found unresponsive and chest compressions started. Glucose at this time noted to be 41. ROSC was achieved approximately 4 minutes of compressions, w/ rhythm noted to be PEA. Pt was then intubated at bedside and sinus rhythm was seen on telemetry  -Hgb drop from 8.0--->3.4, s/p massive transfusion  -s/p right common femoral artery access, Left renal angiogram with irregularity of left lower pole arterial branch, Coil embolization performed with decreased flow. IR recs appreciated  -Intubated and sedated on pressors with myoclonic activity and signs of anoxic brain injury on CTH 3/20/25      Will continue to follow.    Meghana Real NP  Hematology/Oncology  [facility]  [phone] (Office)  [phone] (Alt office)  Evenings and weekends please call MD on call or office     This is a 44 year old male with sickle cell disease who presents with low hemoglobin.  s/p left renal bx with stent placement 3/19    1. Sickle cell anemia  -- Baseline hemoglobin outpatient ~6.0, sent in for hemoglobin 5.3   -- Labs consistent with hemolysis- elevated bili though improving,  LDH 1121 which is downtrending  -- CXR neg. CTA chest w/o PE though RUL opacity. Patient is asymptomatic and saturating well on room air.   -- Continue with folic acid, encourage hydration, optimize pain control on IV PCA   -- Monitor CBC and transfuse to maintain hg >6. s/p 1 unit pRBC, some improvement but would recommend additional 2 units pRBC    2. R IJ DVT   -- Dx 12/2024, currently on therapeutic Lovenox , now holding  -- Rec repeat US to evaluate clot, and if neg then RUE may be used for access   -- Pt will continue to need at least 6 months of anticoagulation    3. Back pain   -- Typical of his sickle cell pain   -- Outpatient he is on oxycodone 30mg PO q4h    4. Iron overload   -- Cont Jadenu    5. r/o malignancy   -- Prior imaging with concern for urothelial malignancy   -- s/p  L ureteroscopy, RGP, biopsy, stent placement L kidney wash, L kidney lower pole mass biopsy 3/19, will follow pathology    6. RRT  --s/p rapid response for hypoglycemia this AM, and then  at approximately 9:30AM a code blue was called when pt was found unresponsive and chest compressions started. Glucose at this time noted to be 41. ROSC was achieved approximately 4 minutes of compressions, w/ rhythm noted to be PEA. Pt was then intubated at bedside and sinus rhythm was seen on telemetry  -Hgb drop from 8.0--->3.4, s/p massive transfusion  -s/p right common femoral artery access, Left renal angiogram with irregularity of left lower pole arterial branch, Coil embolization performed with decreased flow. IR recs appreciated  -Intubated and sedated on pressors with myoclonic activity and signs of anoxic brain injury on CTH 3/20/25      Will continue to follow.    Meghana Real NP  Hematology/Oncology  [facility]  [phone] (Office)  [phone] (Alt office)  Evenings and weekends please call MD on call or office

## 2025-03-21 NOTE — CHART NOTE - NSCHARTNOTEFT_GEN_A_CORE
EEG Preliminary Report, first 30 minutes    Generalized periodic discharges associated with myclonic jerks, consistent with cortical myoclonus.    Final report to follow.    Diann Xiao MD EEG Preliminary Report, first 6 hours    Generalized periodic discharges, at times associated with myoclonic jerks, consistent with cortical myoclonus.    Final report to follow.    Diann Xiao MD

## 2025-03-21 NOTE — PROGRESS NOTE ADULT - ATTENDING COMMENTS
1130 am. At bedside, discussed case with neurology attending and EEG findings.  CT head reviewed.  As per neurology, MRI head will be more telling regarding degree of anoxic brain injury during code. Chart reviewed to determine timing of rapid response and code blue.   Currently pressor requirements decreasing, lactate improving. Creatinine rising, minimal urine output, may need to initiate CRRT. Discussed with MICU attending. Current clinical status discussed with the patient's brother who was at the bedside.   All questions answered. Remains in critical condition.

## 2025-03-21 NOTE — PROGRESS NOTE ADULT - NS ATTEND AMEND GEN_ALL_CORE FT
Agree with above assessment and plan.    44 year old male with sickle cell disease who presents with low hemoglobin.  s/p left renal bx with stent placement 3/19. s/p rapid response for hypoglycemia this AM, and then  at approximately 9:30AM a code blue was called when pt was found unresponsive and chest compressions started. Glucose at this time noted to be 41. ROSC was achieved approximately 4 minutes of compressions, w/ rhythm noted to be PEA. Pt was then intubated at bedside and sinus rhythm was seen on telemetry. Hgb drop from 8.0--->3.4, s/p massive transfusion protocol. s/p right common femoral artery access, Left renal angiogram with irregularity of left lower pole arterial branch, Coil embolization performed with decreased flow. IR recs appreciated. Intubated and sedated on pressors with myoclonic activity and signs of anoxic brain injury on CTH 3/20/25.     Sickle cell disease with pain crisis frequently here with low hemoglobin, baseline 5.3. s/p now MTP.    Continue ICU care. Palliative involvement.

## 2025-03-21 NOTE — PROGRESS NOTE ADULT - ATTENDING COMMENTS
44 year old male with a history of SSD, UE DVT, gout, HTN, with L renal mass admitted with acute anemia/ sickle cell crisis and evaluation of his left renal mass s/p left ureteroscopy and biopsy (3/19/25) with 24 hour post operative course complicated by rapid responses for hypoglycemia c/b cardiac arrest x 4 minutes CPR/1 epi with ROSC, transferred to MICU for further care     Found to be profoundly anemic, acidemic, with multiorgan dysfunction overall concerning for acute hemorrhagic shock    N: Sedation  Seizure like activity post arrest with CTH mild loss of gray-white differentiation c/w hypoxemic injury  - vEEG  - NSE  - Will hold off Keppra  - vEEG  - Trend CK  - Versed 0.2, Fent 4--> 17 units/hour, will titrate down and add Prop (allow for better neurologic assessments)  - RASS  -3 to -4   - Appreciate neuro closely  CV: Shock  PEA arrest in the setting of massive hemorrhage s/p 5 PRBC 3 plasma, 3 plateles  - Continue with low dose levophed  - MAP >65  - Coags, trend CBC closely; q 8 for today  - Formal TTE  P: Acute hypoxemic respiratory failure iso cardiac arrest  CT chest 3/15 with RUL nodule, mucus plugging, atalectasis  History of DVT, off A/C x 24 hours  - Lung protective ventilation  - Adjust based on BG  - SBT as able  R: MATA iso arrest, hypovolemic shock; suspect ATN  Renal mass s/p ureteroscopy and biopsy with urology  Hyperkalemia  Profound acidemia iso elevatd lactate  - Will discuss CRRT and next steps with family  - Dave  - Urology following   - Trend Cr, uop, lytes  - Bicarb drip-- Will DC  GI: Shock liver  Ileus  - Trend LFTs  - OG access; low intermittent suction  Heme: SSD  Pain crisis   Acute blood loss anemia s/p resus with 1:1:1 for hemorrhagic shock  VTE (off A/C) iso hemhorrahge   - Coags stat  - Trend hemolysis labs  - Trend CBC  - IVF  Endo: Hypoglycemia  - D5 with bicarb  - Monitor BG q 1 hour for now  - Avoid hypoglycemia  ID:   - Empiric antibiotics: Aztreonam and Flagyl, Vanc for GPC in light of recent procedure; overall low suspicion that this is profound sepsis and septic shock  - Pan culture, will follow       DVT: SCD  Full Code  Updated family at bedside.

## 2025-03-21 NOTE — PROGRESS NOTE ADULT - SUBJECTIVE AND OBJECTIVE BOX
Subjective:  Intubated, sedated, levo and midazolam gtt    Objective:    Vital signs  T(C): , Max: 37.4 (03-20-25 @ 18:00)  HR: 100 (03-21-25 @ 11:17)  BP: 101/56 (03-20-25 @ 12:00)  SpO2: 96% (03-21-25 @ 11:17)  Wt(kg): --    Output     03-20 @ 07:01  -  03-21 @ 07:00  --------------------------------------------------------  IN: 6731.2 mL / OUT: 600 mL / NET: 6131.2 mL    03-21 @ 07:01  -  03-21 @ 11:19  --------------------------------------------------------  IN: 307.1 mL / OUT: 5 mL / NET: 302.1 mL        Gen: intubated, sedated, myoclonic activity  Abd: softly distended  : daniel secured in place, draining dark yeyo urine    Labs                        8.4    32.50 )-----------( 234      ( 20 Mar 2025 22:47 )             23.5     21 Mar 2025 00:07    138    |  98     |  61     ----------------------------<  102    5.0     |  20     |  2.38     Ca    9.6        21 Mar 2025 00:07  Phos  5.6       21 Mar 2025 00:07  Mg     1.70      21 Mar 2025 00:07        Urine Cx: ?  Blood Cx: ?      Imaging

## 2025-03-22 ENCOUNTER — RESULT REVIEW (OUTPATIENT)
Age: 45
End: 2025-03-22

## 2025-03-22 LAB
ALBUMIN SERPL ELPH-MCNC: 2.7 G/DL — LOW (ref 3.3–5)
ALP SERPL-CCNC: 96 U/L — SIGNIFICANT CHANGE UP (ref 40–120)
ALT FLD-CCNC: 288 U/L — HIGH (ref 4–41)
ANION GAP SERPL CALC-SCNC: 16 MMOL/L — HIGH (ref 7–14)
ANION GAP SERPL CALC-SCNC: 18 MMOL/L — HIGH (ref 7–14)
ANION GAP SERPL CALC-SCNC: 18 MMOL/L — HIGH (ref 7–14)
ANION GAP SERPL CALC-SCNC: 19 MMOL/L — HIGH (ref 7–14)
APTT BLD: 31.2 SEC — SIGNIFICANT CHANGE UP (ref 24.5–35.6)
AST SERPL-CCNC: 553 U/L — HIGH (ref 4–40)
BASOPHILS # BLD AUTO: 0.15 K/UL — SIGNIFICANT CHANGE UP (ref 0–0.2)
BASOPHILS NFR BLD AUTO: 0.7 % — SIGNIFICANT CHANGE UP (ref 0–2)
BILIRUB DIRECT SERPL-MCNC: 12.5 MG/DL — HIGH (ref 0–0.3)
BILIRUB INDIRECT FLD-MCNC: 2.2 MG/DL — HIGH (ref 0–1)
BILIRUB SERPL-MCNC: 14.7 MG/DL — HIGH (ref 0.2–1.2)
BILIRUB SERPL-MCNC: 14.7 MG/DL — HIGH (ref 0.2–1.2)
BLD GP AB SCN SERPL QL: NEGATIVE — SIGNIFICANT CHANGE UP
BLOOD GAS ARTERIAL COMPREHENSIVE RESULT: SIGNIFICANT CHANGE UP
BUN SERPL-MCNC: 82 MG/DL — HIGH (ref 7–23)
BUN SERPL-MCNC: 86 MG/DL — HIGH (ref 7–23)
BUN SERPL-MCNC: 90 MG/DL — HIGH (ref 7–23)
BUN SERPL-MCNC: 91 MG/DL — HIGH (ref 7–23)
CA-I BLD-SCNC: 1.16 MMOL/L — SIGNIFICANT CHANGE UP (ref 1.15–1.29)
CALCIUM SERPL-MCNC: 8.8 MG/DL — SIGNIFICANT CHANGE UP (ref 8.4–10.5)
CALCIUM SERPL-MCNC: 9 MG/DL — SIGNIFICANT CHANGE UP (ref 8.4–10.5)
CALCIUM SERPL-MCNC: 9 MG/DL — SIGNIFICANT CHANGE UP (ref 8.4–10.5)
CALCIUM SERPL-MCNC: 9.1 MG/DL — SIGNIFICANT CHANGE UP (ref 8.4–10.5)
CHLORIDE SERPL-SCNC: 95 MMOL/L — LOW (ref 98–107)
CHLORIDE SERPL-SCNC: 96 MMOL/L — LOW (ref 98–107)
CHLORIDE SERPL-SCNC: 97 MMOL/L — LOW (ref 98–107)
CHLORIDE SERPL-SCNC: 98 MMOL/L — SIGNIFICANT CHANGE UP (ref 98–107)
CK SERPL-CCNC: 909 U/L — HIGH (ref 30–200)
CO2 SERPL-SCNC: 20 MMOL/L — LOW (ref 22–31)
CO2 SERPL-SCNC: 22 MMOL/L — SIGNIFICANT CHANGE UP (ref 22–31)
CO2 SERPL-SCNC: 22 MMOL/L — SIGNIFICANT CHANGE UP (ref 22–31)
CO2 SERPL-SCNC: 23 MMOL/L — SIGNIFICANT CHANGE UP (ref 22–31)
CREAT SERPL-MCNC: 3.68 MG/DL — HIGH (ref 0.5–1.3)
CREAT SERPL-MCNC: 4.11 MG/DL — HIGH (ref 0.5–1.3)
CREAT SERPL-MCNC: 4.55 MG/DL — HIGH (ref 0.5–1.3)
CREAT SERPL-MCNC: 4.77 MG/DL — HIGH (ref 0.5–1.3)
EGFR: 15 ML/MIN/1.73M2 — LOW
EGFR: 17 ML/MIN/1.73M2 — LOW
EGFR: 17 ML/MIN/1.73M2 — LOW
EGFR: 20 ML/MIN/1.73M2 — LOW
EGFR: 20 ML/MIN/1.73M2 — LOW
EOSINOPHIL # BLD AUTO: 0.01 K/UL — SIGNIFICANT CHANGE UP (ref 0–0.5)
EOSINOPHIL NFR BLD AUTO: 0 % — SIGNIFICANT CHANGE UP (ref 0–6)
GLUCOSE BLDC GLUCOMTR-MCNC: 106 MG/DL — HIGH (ref 70–99)
GLUCOSE BLDC GLUCOMTR-MCNC: 110 MG/DL — HIGH (ref 70–99)
GLUCOSE BLDC GLUCOMTR-MCNC: 111 MG/DL — HIGH (ref 70–99)
GLUCOSE BLDC GLUCOMTR-MCNC: 113 MG/DL — HIGH (ref 70–99)
GLUCOSE SERPL-MCNC: 100 MG/DL — HIGH (ref 70–99)
GLUCOSE SERPL-MCNC: 101 MG/DL — HIGH (ref 70–99)
GLUCOSE SERPL-MCNC: 102 MG/DL — HIGH (ref 70–99)
GLUCOSE SERPL-MCNC: 104 MG/DL — HIGH (ref 70–99)
GRAM STN FLD: SIGNIFICANT CHANGE UP
HAPTOGLOB SERPL-MCNC: <20 MG/DL — LOW (ref 34–200)
HCT VFR BLD CALC: 22.9 % — LOW (ref 39–50)
HGB BLD-MCNC: 8.4 G/DL — LOW (ref 13–17)
IANC: 17.83 K/UL — HIGH (ref 1.8–7.4)
IMM GRANULOCYTES NFR BLD AUTO: 0.7 % — SIGNIFICANT CHANGE UP (ref 0–0.9)
INR BLD: 1.32 RATIO — HIGH (ref 0.85–1.16)
LDH SERPL L TO P-CCNC: 1823 U/L — HIGH (ref 135–225)
LYMPHOCYTES # BLD AUTO: 1.84 K/UL — SIGNIFICANT CHANGE UP (ref 1–3.3)
LYMPHOCYTES # BLD AUTO: 8.6 % — LOW (ref 13–44)
MAGNESIUM SERPL-MCNC: 1.9 MG/DL — SIGNIFICANT CHANGE UP (ref 1.6–2.6)
MAGNESIUM SERPL-MCNC: 2.4 MG/DL — SIGNIFICANT CHANGE UP (ref 1.6–2.6)
MCHC RBC-ENTMCNC: 30.2 PG — SIGNIFICANT CHANGE UP (ref 27–34)
MCHC RBC-ENTMCNC: 36.7 G/DL — HIGH (ref 32–36)
MCV RBC AUTO: 82.4 FL — SIGNIFICANT CHANGE UP (ref 80–100)
MONOCYTES # BLD AUTO: 1.52 K/UL — HIGH (ref 0–0.9)
MONOCYTES NFR BLD AUTO: 7.1 % — SIGNIFICANT CHANGE UP (ref 2–14)
NEUTROPHILS # BLD AUTO: 17.83 K/UL — HIGH (ref 1.8–7.4)
NEUTROPHILS NFR BLD AUTO: 82.9 % — HIGH (ref 43–77)
NRBC # BLD AUTO: 0.11 K/UL — HIGH (ref 0–0)
NRBC # FLD: 0.11 K/UL — HIGH (ref 0–0)
NRBC BLD AUTO-RTO: 0 /100 WBCS — SIGNIFICANT CHANGE UP (ref 0–0)
PHOSPHATE SERPL-MCNC: 4.7 MG/DL — HIGH (ref 2.5–4.5)
PHOSPHATE SERPL-MCNC: 4.9 MG/DL — HIGH (ref 2.5–4.5)
PLATELET # BLD AUTO: 168 K/UL — SIGNIFICANT CHANGE UP (ref 150–400)
POTASSIUM SERPL-MCNC: 4.6 MMOL/L — SIGNIFICANT CHANGE UP (ref 3.5–5.3)
POTASSIUM SERPL-MCNC: 4.7 MMOL/L — SIGNIFICANT CHANGE UP (ref 3.5–5.3)
POTASSIUM SERPL-MCNC: 4.7 MMOL/L — SIGNIFICANT CHANGE UP (ref 3.5–5.3)
POTASSIUM SERPL-MCNC: 4.8 MMOL/L — SIGNIFICANT CHANGE UP (ref 3.5–5.3)
POTASSIUM SERPL-SCNC: 4.6 MMOL/L — SIGNIFICANT CHANGE UP (ref 3.5–5.3)
POTASSIUM SERPL-SCNC: 4.7 MMOL/L — SIGNIFICANT CHANGE UP (ref 3.5–5.3)
POTASSIUM SERPL-SCNC: 4.7 MMOL/L — SIGNIFICANT CHANGE UP (ref 3.5–5.3)
POTASSIUM SERPL-SCNC: 4.8 MMOL/L — SIGNIFICANT CHANGE UP (ref 3.5–5.3)
PROT SERPL-MCNC: 5 G/DL — LOW (ref 6–8.3)
PROTHROM AB SERPL-ACNC: 15.7 SEC — HIGH (ref 9.9–13.4)
RBC # BLD: 2.78 M/UL — LOW (ref 4.2–5.8)
RBC # BLD: 2.78 M/UL — LOW (ref 4.2–5.8)
RBC # FLD: 17.1 % — HIGH (ref 10.3–14.5)
RETICS #: 64.2 K/UL — SIGNIFICANT CHANGE UP (ref 25–125)
RETICS/RBC NFR: 2.3 % — SIGNIFICANT CHANGE UP (ref 0.5–2.5)
RH IG SCN BLD-IMP: POSITIVE — SIGNIFICANT CHANGE UP
SODIUM SERPL-SCNC: 134 MMOL/L — LOW (ref 135–145)
SODIUM SERPL-SCNC: 136 MMOL/L — SIGNIFICANT CHANGE UP (ref 135–145)
SODIUM SERPL-SCNC: 137 MMOL/L — SIGNIFICANT CHANGE UP (ref 135–145)
SODIUM SERPL-SCNC: 137 MMOL/L — SIGNIFICANT CHANGE UP (ref 135–145)
SPECIMEN SOURCE: SIGNIFICANT CHANGE UP
WBC # BLD: 21.49 K/UL — HIGH (ref 3.8–10.5)
WBC # FLD AUTO: 21.49 K/UL — HIGH (ref 3.8–10.5)

## 2025-03-22 PROCEDURE — 93306 TTE W/DOPPLER COMPLETE: CPT | Mod: 26

## 2025-03-22 PROCEDURE — 99232 SBSQ HOSP IP/OBS MODERATE 35: CPT

## 2025-03-22 PROCEDURE — 71045 X-RAY EXAM CHEST 1 VIEW: CPT | Mod: 26

## 2025-03-22 PROCEDURE — 99233 SBSQ HOSP IP/OBS HIGH 50: CPT | Mod: GC

## 2025-03-22 PROCEDURE — 95720 EEG PHY/QHP EA INCR W/VEEG: CPT

## 2025-03-22 PROCEDURE — 99291 CRITICAL CARE FIRST HOUR: CPT | Mod: GC

## 2025-03-22 RX ORDER — LEVETIRACETAM 10 MG/ML
500 INJECTION, SOLUTION INTRAVENOUS
Refills: 0 | Status: DISCONTINUED | OUTPATIENT
Start: 2025-03-22 | End: 2025-03-24

## 2025-03-22 RX ORDER — LORAZEPAM 4 MG/ML
2 VIAL (ML) INJECTION ONCE
Refills: 0 | Status: DISCONTINUED | OUTPATIENT
Start: 2025-03-22 | End: 2025-03-22

## 2025-03-22 RX ORDER — BUMETANIDE 1 MG/1
2 TABLET ORAL ONCE
Refills: 0 | Status: COMPLETED | OUTPATIENT
Start: 2025-03-22 | End: 2025-03-22

## 2025-03-22 RX ORDER — FOLIC ACID 1 MG/1
1 TABLET ORAL DAILY
Refills: 0 | Status: DISCONTINUED | OUTPATIENT
Start: 2025-03-22 | End: 2025-04-12

## 2025-03-22 RX ORDER — AZTREONAM 2 G/1
2000 INJECTION, POWDER, LYOPHILIZED, FOR SOLUTION INTRAMUSCULAR; INTRAVENOUS
Refills: 0 | Status: DISCONTINUED | OUTPATIENT
Start: 2025-03-22 | End: 2025-03-27

## 2025-03-22 RX ORDER — DEFERASIROX 90 MG/1
720 TABLET, FILM COATED ORAL DAILY
Refills: 0 | Status: DISCONTINUED | OUTPATIENT
Start: 2025-03-22 | End: 2025-03-24

## 2025-03-22 RX ORDER — MAGNESIUM SULFATE 500 MG/ML
2 SYRINGE (ML) INJECTION ONCE
Refills: 0 | Status: COMPLETED | OUTPATIENT
Start: 2025-03-22 | End: 2025-03-22

## 2025-03-22 RX ORDER — METRONIDAZOLE 250 MG
500 TABLET ORAL EVERY 8 HOURS
Refills: 0 | Status: DISCONTINUED | OUTPATIENT
Start: 2025-03-22 | End: 2025-03-24

## 2025-03-22 RX ORDER — BUMETANIDE 1 MG/1
4 TABLET ORAL
Qty: 20 | Refills: 0 | Status: DISCONTINUED | OUTPATIENT
Start: 2025-03-22 | End: 2025-03-23

## 2025-03-22 RX ADMIN — AZTREONAM 200 MILLIGRAM(S): 2 INJECTION, POWDER, LYOPHILIZED, FOR SOLUTION INTRAMUSCULAR; INTRAVENOUS at 12:50

## 2025-03-22 RX ADMIN — BUMETANIDE 10 MG/HR: 1 TABLET ORAL at 21:07

## 2025-03-22 RX ADMIN — LEVETIRACETAM 750 MILLIGRAM(S): 10 INJECTION, SOLUTION INTRAVENOUS at 12:39

## 2025-03-22 RX ADMIN — DEFERASIROX 720 MILLIGRAM(S): 90 TABLET, FILM COATED ORAL at 12:38

## 2025-03-22 RX ADMIN — Medication 100 MILLIGRAM(S): at 12:38

## 2025-03-22 RX ADMIN — Medication 2 MILLIGRAM(S): at 04:21

## 2025-03-22 RX ADMIN — Medication 8.88 MG/KG/HR: at 19:27

## 2025-03-22 RX ADMIN — Medication 1 APPLICATION(S): at 17:44

## 2025-03-22 RX ADMIN — Medication 1 APPLICATION(S): at 12:57

## 2025-03-22 RX ADMIN — Medication 25 GRAM(S): at 01:40

## 2025-03-22 RX ADMIN — Medication 1 APPLICATION(S): at 05:21

## 2025-03-22 RX ADMIN — PROPOFOL 13.3 MICROGRAM(S)/KG/MIN: 10 INJECTION, EMULSION INTRAVENOUS at 19:26

## 2025-03-22 RX ADMIN — LEVETIRACETAM 500 MILLIGRAM(S): 10 INJECTION, SOLUTION INTRAVENOUS at 22:22

## 2025-03-22 RX ADMIN — Medication 15 MILLILITER(S): at 05:21

## 2025-03-22 RX ADMIN — NOREPINEPHRINE BITARTRATE 167 MICROGRAM(S)/KG/MIN: 8 SOLUTION at 19:27

## 2025-03-22 RX ADMIN — BUMETANIDE 2 MILLIGRAM(S): 1 TABLET ORAL at 05:22

## 2025-03-22 RX ADMIN — Medication 4.44 MICROGRAM(S)/KG/HR: at 19:27

## 2025-03-22 RX ADMIN — Medication 15 MILLILITER(S): at 17:44

## 2025-03-22 RX ADMIN — FOLIC ACID 1 MILLIGRAM(S): 1 TABLET ORAL at 12:57

## 2025-03-22 RX ADMIN — Medication 100 MILLIGRAM(S): at 21:07

## 2025-03-22 NOTE — PROGRESS NOTE ADULT - ASSESSMENT
Assessment/Summary:  -44year old man PMHX sickle cell prior acute chest syndrome, iron overload, Gout, HTN, RUE DVT (was on AC), renal lesion  -admitted 3/15/25 sickle cell crisis, received pRBCs and  s/p L ureteroscopy w/ biopsy and stent placement on 3/19.  -RRT 3/20 9am for ams/hypoglycemia, code blue 3/20 ~930am for PEA arrest attributed to hypoglycemia. Time to rosc from start of compressions 4 minutes but unknown total down time   -3/20 ~1400 Developed myoclonic movements of head, abdomen, LLE starting 3/20 ~1400, increased in frequency overnight into 3/21. Also in MICU hemorrhage shock due to RP bleed s/p embo with IR  -Neurology consulted for post anoxic myoclonic jerks.   -Vitals (last 24 hours) BP 90s-120s/50s-60s, HR 90s-140s, Afebrile tmax 37.4, RR17-23 satting 93+ on vent save for short periods of hypoxia when clenching on vent.   -Exam 3/21 2:00 on sedation (prop@50) comatose w/ myoclonic movements of head, abdomen, LLE worse with noxious. PERRL but loss of other brainstem function (Dolls eyes, absent corneal, absent gag, synchronous with vent)  -Labs w/ occasional hypoglycemia responsive to IV dextrose, normocytic anemia improved after pRBCs. Marked leukocytosis to 30k (PMN predom) since arrest, MATA, transaminitis,, elevated tbili, Improved severe lactic acidosis. INR normal prior to arrest, increased to 2 and now downtrending. ptt grossly normal. Micro: UA trce  LE, +nit, no WBCs no bacteria. HIV negative.   -CTH (3/20/25 14:00 - only 4.5 hours post arrest) with loss of gray white diffusely     Impression:  -PEA arrest in the setting of hypoglycemia leading to anoxic brain injury and post anoxic myoclonic status epilepticus involving jaw and occasionally impeding vent  -Prognosis for meaningful neurological recovery is guarded. Time to ROSC from start of CPR is only 4 minutes but total downtime unknown (<30mins) is overall indeterminant. Early anoxic injury on CT and early post arrest myoclonus are unfavorable. Exam confounded by sedation is not helpful for prognostication.     Recommendations:  Investigations:  -Video EEG   -MRI brain w/o contrast to evaluate for anoxic brain injury on post arrest day 5   -Check Neuron Specific Enolase (NSE) on 24-72hrs post-cardiac arrest    --Please obtain first NSE 24-48 hrs post-cardiac arrest (Between 930am 3/21 and 930am 3/22)  --Please obtain second NSE 24 hours after drawing first NSE     Management:  -GOC per primary team  -C/W Sedation as per primary team  -If no improvement with increasing midazolam, -Can load levetiracetam 2500mg (~30mg/kg) and continue 500mg Q8 (based on current CrCl of 48.8ml/min) - if kidney functions dramatically worsens will need dose decrease. (VPA another option but avoiding for now given liver issues)    Case to be seen by attending.  Note finalized upon attestation by the Attending.

## 2025-03-22 NOTE — PROGRESS NOTE ADULT - SUBJECTIVE AND OBJECTIVE BOX
Patient is a 44y old  Male who presents with a chief complaint of Referred by Hematologist for low Hg (22 Mar 2025 06:57)  patient remains critically ill in MICU, intubated and requiring pressors      MEDICATIONS  (STANDING):  allopurinol 50 milliGRAM(s) Oral <User Schedule>  chlorhexidine 0.12% Liquid 15 milliLiter(s) Oral Mucosa every 12 hours  chlorhexidine 2% Cloths 1 Application(s) Topical daily  deferasirox Tablet 720 milliGRAM(s) Oral daily  fentaNYL   Infusion. 0.5 MICROgram(s)/kG/Hr (4.44 mL/Hr) IV Continuous <Continuous>  folic acid 1 milliGRAM(s) Oral daily  influenza   Vaccine 0.5 milliLiter(s) IntraMuscular once  levETIRAcetam   Injectable 750 milliGRAM(s) IV Push daily  midazolam Infusion. 0.1 mG/kG/Hr (8.88 mL/Hr) IV Continuous <Continuous>  norepinephrine Infusion 1 MICROgram(s)/kG/Min (167 mL/Hr) IV Continuous <Continuous>  petrolatum Ophthalmic Ointment 1 Application(s) Both EYES two times a day  propofol Infusion 25 MICROgram(s)/kG/Min (13.3 mL/Hr) IV Continuous <Continuous>    MEDICATIONS  (PRN):        Vital Signs Last 24 Hrs  T(C): 38.1 (22 Mar 2025 04:00), Max: 38.2 (21 Mar 2025 20:00)  T(F): 100.6 (22 Mar 2025 04:00), Max: 100.8 (21 Mar 2025 20:00)  HR: 109 (22 Mar 2025 06:56) (94 - 109)  BP: --  BP(mean): --  RR: 18 (22 Mar 2025 06:00) (8 - 31)  SpO2: 97% (22 Mar 2025 06:56) (96% - 100%)    Parameters below as of 22 Mar 2025 04:00  Patient On (Oxygen Delivery Method): ventilator    O2 Concentration (%): 45    PE    Anicteric, MMM  RRR  CTAB  Abd soft, NT, ND  No c/c/e  No rash grossly                            8.4    21.49 )-----------( 168      ( 22 Mar 2025 00:11 )             22.9       03-22    137  |  96[L]  |  82[H]  ----------------------------<  102[H]  4.8   |  23  |  3.68[H]    Ca    8.8      22 Mar 2025 00:11  Phos  4.9     03-22  Mg     1.90     03-22    TPro  5.0[L]  /  Alb  2.7[L]  /  TBili  14.7[H]  /  DBili  12.5[H]  /  AST  553[H]  /  ALT  288[H]  /  AlkPhos  96  03-22

## 2025-03-22 NOTE — PROGRESS NOTE ADULT - ASSESSMENT
Patient is 44yMale with PMHx of sickle cell disease admitted for anemia concerning for sickle cell crisis s/p ureteroscopy w/ L kidney biopsy, c/c/b PEA arrest of unclear etiology, now intubated and on pressors for possible hemorrhagic shock     NEURO:  #Sedated  - Start propofol  - C/w versed  - C/w fent     #Seizures  Witness myoclonic jerking concerning for seizures iso anoxic brain injury  s/p 2.5g keppra load  - c/w keppra 750 per neuro  - F/u neuro recommendations  - Trend enolase   - F/u MRI brain to r/o anoxic brain injury     CV:  #Shock  Suspected hemorrhagic shock s/p kidney biopsy  last dose of lovenox AC on 3/18 at 5PM   Hgb 8 -> ~3   - c/w levophed, map goal >65  - Monitor hgb q8h, can transition to q12 if no concern for active bleeding   - Urology f/u     #Hx of DVT  Likely w/ chronic DVT   - CTM  - Bullae present on arm with DVT, CTM     PULM:  #Acute hypoxic respiratory failure  In the setting of PEA arrest   - Wean vent as tolerated       RENAL:  #MATA  Likely ATN. Currently w/ anuria concerning for acute renal failure   - Dave  - urology contacted  - Trend Cr, uop, lytes  - Stop bicarb drip  - Discuss dialysis with family       GI:  #Shock liver  - CTM     #Diet:  - consider starting tube feeds when less OG tube     HEMATOLOGIC:  #Shock possibly 2/2 to hemorrhaging  - Resuscitation as above     #Sickle cell crisis  - Daily hgb, retic, hapto, ldh, bili   - heme following       #DVT prophylaxis   - SCD    Endo:  #Hypoglycemia  - BG q4  - No glycemic agents     ID:  - Monitor off of abx   - F/u cultures     MSK:  #infiltration  Infiltration of sodium bicarb into left arm, now with arm distension and bullae  - Vascular surgery consulted for compartment syndrome concern  - CTM  - F/u duplex     Ethics: Full Code  Patient is 44yMale with PMHx of sickle cell disease admitted for anemia concerning for sickle cell crisis s/p ureteroscopy w/ L kidney biopsy, c/c/b PEA arrest of unclear etiology, now intubated and on pressors for possible hemorrhagic shock     NEURO:  #Sedated  - c/w propofol   - C/w versed  - C/w fent     #Seizures  Witness myoclonic jerking concerning for seizures iso anoxic brain injury  s/p 2.5g keppra load  EEG report with severe diffuse/multifocal cerebral dysfunction   - c/w keppra 500mg q8h per neuro   - F/u neuro recommendations  - Trend enolase   - F/u MRI brain to r/o anoxic brain injury     CV:  #Shock  Suspected hemorrhagic shock s/p kidney biopsy  last dose of lovenox AC on 3/18 at 5PM   Hgb 8 -> ~3   - Hemodynamically stable off pressors   - Monitor cbc q24    #Hx of DVT  Likely w/ chronic DVT   - CTM  - Bullae present on arm with DVT, CTM     PULM:  #Acute hypoxic respiratory failure  In the setting of PEA arrest   CXR with R lung field and left mid and lower lung field hazy opacities.  - Abx as below in ID   - Wean vent as tolerated       RENAL:  #MATA  Likely ATN. Currently w/ anuria concerning for acute renal failure   s/p failed bumex challenge   - Dave  - Trend Cr, uop, lytes  - Stop bicarb drip  - Discuss dialysis with family       GI:  #Shock liver  - CTM     #Diet:  - NPO w/ tube feed     HEMATOLOGIC:  #Sickle cell crisis  - Daily hgb, retic, hapto, ldh, bili   - heme following       #DVT prophylaxis   - SCD    Endo:  #JUAN    ID:  Febrile overnight   - Resume aztreonam and flagyl (patient w/ multiple listed allergies)    MSK:  #infiltration  Infiltration of sodium bicarb into left arm, now with arm distension and bullae  - Vascular surgery consulted for compartment syndrome concern  - CTM    Ethics: Full Code

## 2025-03-22 NOTE — PROGRESS NOTE ADULT - ASSESSMENT
This is a 44 year old male with sickle cell disease who presents with low hemoglobin.  s/p left renal bx with stent placement 3/19    1. Sickle cell anemia  -- Baseline hemoglobin outpatient ~6.0, sent in for hemoglobin 5.3   -- Labs consistent with hemolysis- bili 15, LDH 1820 likely multifactorial in setting of sepsis and liver dysfunction, Hgb stable low suspicion for hyperhemolysis, please check Uric acid and G6PD  --trend Hemolysis labs daily  -- CXR neg. CTA chest w/o PE though RUL opacity. Patient is asymptomatic and saturating well on room air.   -- Continue with folic acid, encourage hydration, optimize pain control on IV PCA   -- Monitor CBC and transfuse to maintain hg >6. s/p 4 units 3/20, stable Hgb since    2. R IJ DVT   -- Dx 12/2024,  holding AC  -- Rec repeat US to evaluate clot, and if neg then RUE may be used for access       3. Back pain   -- Typical of his sickle cell pain   -- Outpatient he is on oxycodone 30mg PO q4h    4. Iron overload   -- Cont Jadenu    5. r/o malignancy   -- Prior imaging with concern for urothelial malignancy   -- s/p  L ureteroscopy, RGP, biopsy, stent placement L kidney wash, L kidney lower pole mass biopsy 3/19, will follow pathology    6. RRT  --s/p rapid response for hypoglycemia this AM, and then  at approximately 9:30AM a code blue was called when pt was found unresponsive and chest compressions started. Glucose at this time noted to be 41. ROSC was achieved approximately 4 minutes of compressions, w/ rhythm noted to be PEA. Pt was then intubated at bedside and sinus rhythm was seen on telemetry  -Hgb drop from 8.0--->3.4, s/p massive transfusion 3/19  -s/p right common femoral artery access, Left renal angiogram with irregularity of left lower pole arterial branch, Coil embolization performed with decreased flow. IR recs appreciated  -Intubated and sedated on pressors with myoclonic activity and signs of anoxic brain injury on CTH 3/20/25      Will continue to follow.    Meghana Real NP  Hematology/Oncology  [facility]  [phone] (Office)  [phone] (Alt office)  Evenings and weekends please call MD on call or office

## 2025-03-22 NOTE — PROGRESS NOTE ADULT - ASSESSMENT
44y M s/p  L ureteroscopy, RGP, biopsy, stent placement L kidney wash, L kidney lower pole mass biopsy on 3/19, RRT and code blue 3/20 now in MICU intubated and sedated on pressors with myoclonic activity and signs of anoxic brain injury on CTH 3/20/25.    3/19: s/p L ureteroscopy, RGP, biopsy, stent placement L kidney wash, L kidney lower pole mass biopsy  3/20: RRT 9a and Code Blue 930a, ROSC achieved in 4 minutes, transferred to MICU intubated and sedated on pressors s/p several pRBC, PLT, and plasma transfusions for Hgb 3.4  3/21: H/H 8.4/22.6, Cr 2.38, 2.85  3/22: off pressors, H/H 8.4/22.9, Cr 3.35, 3.68, lactate 1.5    Recommendations:  - Continue daniel catheter, monitor I/O's > low UOP with MATA likely iso hypovolemic shock  - Trend H/H (stable), Cr (uptrending), WBC (downtrending), lactate (cleared 3/22)  - Appreciate neuro recommendations  - Appreciate heme/onc recommendations  - Appreciate excellent MICU care    D/w Dr. Kendrick [facility]  [address] [place] [id number] [phone]   44y M s/p  L ureteroscopy, RGP, biopsy, stent placement L kidney wash, L kidney lower pole mass biopsy on 3/19, RRT and code blue 3/20 now in MICU intubated and sedated on pressors with myoclonic activity and signs of anoxic brain injury on CTH 3/20/25.    3/19: s/p L ureteroscopy, RGP, biopsy, stent placement L kidney wash, L kidney lower pole mass biopsy  3/20: RRT 9a and Code Blue 930a, ROSC achieved in 4 minutes, transferred to MICU intubated and sedated on pressors s/p several pRBC, PLT, and plasma transfusions for Hgb 3.4  3/21: H/H 8.4/22.6, Cr 2.38, 2.85  3/22: off pressors, H/H 8.4/22.9, Cr 3.35, 3.68, lactate 1.5    Recommendations:  - Continue daniel catheter, monitor I/O's > low UOP with MATA likely iso hypovolemic shock  - Trend H/H (stable), Cr (uptrending), WBC (downtrending), lactate (cleared 3/22)  - Appreciate neuro recommendations  - Appreciate heme/onc recommendations  - Appreciate excellent MICU care    [address] [place] [address]  [phone]

## 2025-03-22 NOTE — PROGRESS NOTE ADULT - ATTENDING COMMENTS
seen and examined  on-call covering physician for Dr Currie  s/p ureteroscopy, c/b code blue - now in ICU, intubated and sedated  multiple teams following - appreciate multi-disciplinary management  urology team will continue to follow

## 2025-03-22 NOTE — PROGRESS NOTE ADULT - SUBJECTIVE AND OBJECTIVE BOX
Subjective:  Intubated, sedated    Objective:    Vital signs  T(C): , Max: 38.3 (03-22-25 @ 08:00)  HR: 104 (03-22-25 @ 08:15)  BP: --  SpO2: 97% (03-22-25 @ 08:15)  Wt(kg): --    Output     03-21 @ 07:01  -  03-22 @ 07:00  --------------------------------------------------------  IN: 2210.5 mL / OUT: 290 mL / NET: 1920.5 mL    03-22 @ 07:01  -  03-22 @ 09:03  --------------------------------------------------------  IN: 142 mL / OUT: 15 mL / NET: 127 mL        Gen: NAD  Abd: softly distended  : daniel secured in place, draining scant dark cola urine    Labs                        8.4    21.49 )-----------( 168      ( 22 Mar 2025 00:11 )             22.9     22 Mar 2025 00:11    137    |  96     |  82     ----------------------------<  102    4.8     |  23     |  3.68     Ca    8.8        22 Mar 2025 00:11  Phos  4.9       22 Mar 2025 00:11  Mg     1.90      22 Mar 2025 00:11        Urine Cx: ?  Blood Cx: ?      Imaging

## 2025-03-22 NOTE — PROGRESS NOTE ADULT - NS ATTEND AMEND GEN_ALL_CORE FT
Agree with above assessment and plan.    44 year old male with sickle cell disease who presents with low hemoglobin.  s/p left renal bx with stent placement 3/19. s/p rapid response for hypoglycemia this AM, and then  at approximately 9:30AM a code blue was called when pt was found unresponsive and chest compressions started. Glucose at this time noted to be 41. ROSC was achieved approximately 4 minutes of compressions, w/ rhythm noted to be PEA. Pt was then intubated at bedside and sinus rhythm was seen on telemetry. Hgb drop from 8.0--->3.4, s/p massive transfusion protocol. s/p right common femoral artery access, Left renal angiogram with irregularity of left lower pole arterial branch, Coil embolization performed with decreased flow. IR recs appreciated. Intubated and sedated on pressors with myoclonic activity and signs of anoxic brain injury on CTH 3/20/25.     Sickle cell disease with pain crisis frequently here with low hemoglobin, baseline 5.3. s/p now MTP.    His hemoglobin has been stable since 3/20 although reticulocyte not robust, hemoglobin much higher than his baseline and not dropping further indicating a lower suspicion for hyperhemolysis. Continue to trend hemolysis labs daily.     Continue ICU care. Palliative involvement.

## 2025-03-22 NOTE — EEG REPORT - NS EEG TEXT BOX
TOLU VARGAS [id number]     Study Date: 03-21-25 10:30 - 08:00 03-22-25  Duration x Hours: 21 hrs  --------------------------------------------------------------------------------------------------  History:  CC/ HPI Patient is a 44y old  Male who presents with a chief complaint of Referred by Hematologist for low Hg (22 Mar 2025 09:02)    MEDICATIONS  (STANDING):  allopurinol 50 milliGRAM(s) Oral <User Schedule>  aztreonam  IVPB 2000 milliGRAM(s) IV Intermittent <User Schedule>  chlorhexidine 0.12% Liquid 15 milliLiter(s) Oral Mucosa every 12 hours  chlorhexidine 2% Cloths 1 Application(s) Topical daily  deferasirox Tablet 720 milliGRAM(s) Oral daily  fentaNYL   Infusion. 0.5 MICROgram(s)/kG/Hr (4.44 mL/Hr) IV Continuous <Continuous>  folic acid 1 milliGRAM(s) Oral daily  influenza   Vaccine 0.5 milliLiter(s) IntraMuscular once  levETIRAcetam   Injectable 750 milliGRAM(s) IV Push daily  metroNIDAZOLE  IVPB 500 milliGRAM(s) IV Intermittent every 8 hours  midazolam Infusion. 0.1 mG/kG/Hr (8.88 mL/Hr) IV Continuous <Continuous>  norepinephrine Infusion 1 MICROgram(s)/kG/Min (167 mL/Hr) IV Continuous <Continuous>  petrolatum Ophthalmic Ointment 1 Application(s) Both EYES two times a day  propofol Infusion 25 MICROgram(s)/kG/Min (13.3 mL/Hr) IV Continuous <Continuous>    --------------------------------------------------------------------------------------------------  Study Interpretation:    [[[Abbreviation Key:  PDR=alpha rhythm/posterior dominant rhythm. A-P=anterior posterior gradient.  Amplitude: ‘very low’:<20; ‘low’:20-50; ‘medium’:; ‘high’:>200uV.  Persistence for periodic/rhythmic patterns (% of epoch) ‘rare’:<1%; ‘occasional’:1-10%; ‘frequent’:10-50%; ‘abundant’:50-90%; ‘continuous’:>90%.  Persistence for sporadic discharges: ‘rare’:<1/hr; ‘occasional’:1/min-1/hr; ‘frequent’:>1/min; ‘abundant’:>1/10 sec.  GRDA=generalized rhythmic delta activity; FIRDA=frontal intermittent GRDA; LRDA=lateralized rhythmic delta activity; TIRDA=temporal intermittent rhythmic delta activity;  LPD=PLED=lateralized periodic discharges; GPD=generalized periodic discharges; BiPDs=BiPLEDs=bilateral independent periodic epileptiform discharges; SIRPID=stimulus induced rhythmic, periodic, or ictal appearing discharges; BIRDs=brief potentially ictal rhythmic discharges >4 Hz, lasting .5-10s; PFA=paroxysmal bursts of beta/gamma; LVFA=low voltage fast activity.  Modifiers: +F=with fast component; +S=with spike component; +R=with rhythmic component.  S-B=burst suppression pattern.  Max=maximal. N1-drowsy; N2-stage II sleep; N3-slow wave sleep. SSS/BETS=small sharp spikes/benign epileptiform transients of sleep. HV=hyperventilation; PS=photic stimulation]]]    Daily EEG Visual Analysis    FINDINGS:      Background:  Continuity: The first half of the recording is discontinuous and suppressed, with gradual improvement and introduction of alpha frequencies after around 20:50  Symmetry: symmetric  PDR: not discerned  Reactivity: not discerned  Voltage: suppressed in first half of recording < 10 uV, with later portions showing normal voltage mostly 20-150uV  Anterior Posterior Gradient: present  Other background findings: none  Breach: absent    Background Slowing:  Generalized slowing: none was present.  Focal slowing: none was present.    State Changes:   -Drowsiness not recorded  -N2 sleep transients were not recorded.    Sporadic Epileptiform Discharges:    See below    Rhythmic and Periodic Patterns (RPPs):  Continuous 1-2 Hz GPDs at times time locked with clinical jerking episodes, with improvement and gradual dissipation of GPDs after around 20:50    Electrographic and Electroclinical seizures:  See above    Other Clinical Events:  None    Activation Procedures:   -Hyperventilation was not performed.    -Photic stimulation was not performed.      Artifacts:  Intermittent myogenic and movement artifacts were noted.    ECG:  The heart rate on single channel ECG was predominantly between 70-85 BPM.    EEG Classification / Summary:  Abnormal  EEG in the awake / drowsy / asleep state(s).    1. GPDs, 1-2 Hz, abundant, time locked with clinical jerking episodes, consistent with brief myoclonic seizures, improved after around 20:50  2. Burst suppression in the earlier part of the recording, improved after around 20:50    Clinical Impression:    1. Abundant myoclonic seizures in the setting of a severe diffuse/multifocal cerebral dysfunction which can be seen in the setting of sedative effect or due to an anoxic injury, with improvement after 20:50 suggesting a lowering of sedation.     THIS IS A PRELIMINARY INTERPRETATION ONLY PENDING ATTENDING REVIEW/ATTESTATION    Deonte Ponce DO  Epilepsy Fellow, PGY-5    -------------------------------------------------------------------------------------------------------  [facility] EEG Reading Room Ph#: [phone]  Epilepsy Answering Service after 5PM and before 8:30AM: Ph#: [phone]       TOLU VARGAS [id number]     Study Date: 03-21-25 10:30 - 08:00 03-22-25  Duration x Hours: 21 hrs  --------------------------------------------------------------------------------------------------  History:  CC/ HPI Patient is a 44y old  Male who presents with a chief complaint of Referred by Hematologist for low Hg (22 Mar 2025 09:02)    MEDICATIONS  (STANDING):  allopurinol 50 milliGRAM(s) Oral <User Schedule>  aztreonam  IVPB 2000 milliGRAM(s) IV Intermittent <User Schedule>  chlorhexidine 0.12% Liquid 15 milliLiter(s) Oral Mucosa every 12 hours  chlorhexidine 2% Cloths 1 Application(s) Topical daily  deferasirox Tablet 720 milliGRAM(s) Oral daily  fentaNYL   Infusion. 0.5 MICROgram(s)/kG/Hr (4.44 mL/Hr) IV Continuous <Continuous>  folic acid 1 milliGRAM(s) Oral daily  influenza   Vaccine 0.5 milliLiter(s) IntraMuscular once  levETIRAcetam   Injectable 750 milliGRAM(s) IV Push daily  metroNIDAZOLE  IVPB 500 milliGRAM(s) IV Intermittent every 8 hours  midazolam Infusion. 0.1 mG/kG/Hr (8.88 mL/Hr) IV Continuous <Continuous>  norepinephrine Infusion 1 MICROgram(s)/kG/Min (167 mL/Hr) IV Continuous <Continuous>  petrolatum Ophthalmic Ointment 1 Application(s) Both EYES two times a day  propofol Infusion 25 MICROgram(s)/kG/Min (13.3 mL/Hr) IV Continuous <Continuous>    --------------------------------------------------------------------------------------------------  Study Interpretation:    [[[Abbreviation Key:  PDR=alpha rhythm/posterior dominant rhythm. A-P=anterior posterior gradient.  Amplitude: ‘very low’:<20; ‘low’:20-50; ‘medium’:; ‘high’:>200uV.  Persistence for periodic/rhythmic patterns (% of epoch) ‘rare’:<1%; ‘occasional’:1-10%; ‘frequent’:10-50%; ‘abundant’:50-90%; ‘continuous’:>90%.  Persistence for sporadic discharges: ‘rare’:<1/hr; ‘occasional’:1/min-1/hr; ‘frequent’:>1/min; ‘abundant’:>1/10 sec.  GRDA=generalized rhythmic delta activity; FIRDA=frontal intermittent GRDA; LRDA=lateralized rhythmic delta activity; TIRDA=temporal intermittent rhythmic delta activity;  LPD=PLED=lateralized periodic discharges; GPD=generalized periodic discharges; BiPDs=BiPLEDs=bilateral independent periodic epileptiform discharges; SIRPID=stimulus induced rhythmic, periodic, or ictal appearing discharges; BIRDs=brief potentially ictal rhythmic discharges >4 Hz, lasting .5-10s; PFA=paroxysmal bursts of beta/gamma; LVFA=low voltage fast activity.  Modifiers: +F=with fast component; +S=with spike component; +R=with rhythmic component.  S-B=burst suppression pattern.  Max=maximal. N1-drowsy; N2-stage II sleep; N3-slow wave sleep. SSS/BETS=small sharp spikes/benign epileptiform transients of sleep. HV=hyperventilation; PS=photic stimulation]]]    Daily EEG Visual Analysis    FINDINGS:      Background:  Continuity: The first half of the recording is discontinuous and suppressed, with gradual improvement and introduction of alpha frequencies after around 20:50  Symmetry: symmetric  PDR: not discerned  Reactivity: not discerned  Voltage: suppressed in first half of recording < 10 uV, with later portions showing normal voltage mostly 20-150uV  Anterior Posterior Gradient: present  Other background findings: none  Breach: absent    Background Slowing:  Generalized slowing: none was present.  Focal slowing: none was present.    State Changes:   -Drowsiness not recorded  -N2 sleep transients were not recorded.    Sporadic Epileptiform Discharges:    See below    Rhythmic and Periodic Patterns (RPPs):  Continuous 1-2 Hz GPDs at times time locked with clinical jerking episodes, with improvement and gradual dissipation of GPDs after around 20:50    Electrographic and Electroclinical seizures:  See above    Other Clinical Events:  None    Activation Procedures:   -Hyperventilation was not performed.    -Photic stimulation was not performed.      Artifacts:  Intermittent myogenic and movement artifacts were noted.    ECG:  The heart rate on single channel ECG was predominantly between 70-85 BPM.    EEG Classification / Summary:  Abnormal  EEG in the awake / drowsy / asleep state(s).    1. GPDs, 1-2 Hz, abundant, time locked with clinical jerking episodes, consistent with brief myoclonic seizures, improved after around 20:50  2. Burst suppression in the earlier part of the recording, improved after around 20:50    Clinical Impression:    1. Abundant myoclonic seizures in the setting of a severe diffuse/multifocal cerebral dysfunction which can be seen in the setting of sedative effect or due to an anoxic injury, with improvement after 20:50 suggesting a lowering of sedation.       Deonte Ponce DO  Epilepsy Fellow, PGY-5    -------------------------------------------------------------------------------------------------------  [facility] EEG Reading Room Ph#: [phone]  Epilepsy Answering Service after 5PM and before 8:30AM: Ph#: [phone]

## 2025-03-22 NOTE — PROGRESS NOTE ADULT - ATTENDING COMMENTS
44 year old male with a history of SSD, UE DVT, gout, HTN, with L renal mass admitted with acute anemia/ sickle cell crisis and evaluation of his left renal mass s/p left ureteroscopy and biopsy (3/19/25) with 24 hour post operative course complicated by rapid responses for hypoglycemia c/b cardiac arrest x 4 minutes CPR/1 epi with ROSC, transferred to MICU for further care     Found to be profoundly anemic, acidemic, with multiorgan dysfunction overall concerning for acute hemorrhagic shock    N: Sedation  Seizure like activity post arrest with CTH mild loss of gray-white differentiation c/w hypoxemic injury  - vEEG  - NSE  - Keppra for seizures  - vEEG  - Versed 0.2, Fent 4, Prop  - Eventual MRI  - RASS  -3 to -4   - Appreciate neuro closely  CV: Shock  PEA arrest in the setting of massive hemorrhage s/p 5 PRBC 3 plasma, 3 platelets  RIJ thrombus  L brachial DVT  - Continue with low dose levophed  - MAP >65  - Coags, trend CBC closely-->space to daily  - Formal TTE  - No A/C  P: Acute hypoxemic respiratory failure iso cardiac arrest  CT chest 3/15 with RUL nodule, mucus plugging, atalectasis  History of DVT, off A/C x 24 hours  - Lung protective ventilation  - Adjust based on BG  - SBT as able  R: MATA iso arrest, hypovolemic shock; suspect ATN   Renal mass s/p ureteroscopy and biopsy with urology  Hyperkalemia  Profound acidemia iso elevated lactate  - Will discuss CRRT and next steps with family; No indication for dialysis at this time  - Dave  - Urology following   - Trend Cr, uop, lytes  - Bicarb drip-- Will DC  GI: Shock liver  Ileus  - Trend LFTs  - RUQ ultrasound  - Tube feeds  Heme: SSD  Pain crisis   Acute blood loss anemia s/p resus with 1:1:1 for hemorrhagic shock  VTE (off A/C) iso hemhorrahge   - Coags stat  - Trend hemolysis labs  - Trend CBC  - IVF  - Appreciate heme input  Endo: Hypoglycemia  - D5 with bicarb  - Monitor BG q 1 hour for now  - Avoid hypoglycemia  ID:   - Stopped empiric antibiotics: Aztreonam and Flagyl, Vanc for GPC in light of recent procedure; overall low suspicion that this is profound sepsis and septic shock; Aztreonam and Flagyl added back with fevers and CXR findings cannot rule out aspiration pneumonia. Will plan for 5-7 day course  - Pan culture, will follow       DVT: SCD  Full Code  Updated family at bedside. 44 year old male with a history of SSD, UE DVT, gout, HTN, with L renal mass admitted with acute anemia/ sickle cell crisis and evaluation of his left renal mass s/p left ureteroscopy and biopsy (3/19/25) with 24 hour post operative course complicated by rapid responses for hypoglycemia c/b cardiac arrest x 4 minutes CPR/1 epi with ROSC, transferred to MICU for further care     Found to be profoundly anemic, acidemic, with multiorgan dysfunction overall concerning for acute hemorrhagic shock    N: Sedation  Seizure like activity post arrest with CTH mild loss of gray-white differentiation c/w hypoxemic injury  - vEEG  - NSE  - Keppra for seizures  - vEEG  - Versed 0.2, Fent 4, Prop  - Eventual MRI  - RASS  -3 to -4   - Appreciate neuro closely  CV: Shock  PEA arrest in the setting of massive hemorrhage s/p 5 PRBC 3 plasma, 3 platelets  RIJ thrombus  L brachial DVT  - Continue with low dose levophed  - MAP >65  - Coags, trend CBC closely-->space to daily  - Formal TTE  - No A/C  P: Acute hypoxemic respiratory failure iso cardiac arrest  CT chest 3/15 with RUL nodule, mucus plugging, atalectasis  History of DVT, off A/C x 24 hours  - Lung protective ventilation  - Adjust based on BG  - SBT as able  R: MATA iso arrest, hypovolemic shock; suspect ATN   Renal mass s/p ureteroscopy and biopsy with urology  Hyperkalemia  Profound acidemia iso elevated lactate  - Will discuss HD and next steps with family; No indication for dialysis at this time  - Dave  - Urology following   - Trend Cr, uop, lytes  - Bicarb drip-- Will DC  GI: Shock liver  Ileus  - Trend LFTs  - RUQ ultrasound  - Tube feeds  Heme: SSD  Pain crisis   Acute blood loss anemia s/p resus with 1:1:1 for hemorrhagic shock  VTE (off A/C) iso hemhorrahge   - Coags stat  - Trend hemolysis labs  - Trend CBC  - IVF  - Appreciate heme input  Endo: Hypoglycemia  - D5 with bicarb  - Monitor BG q 1 hour for now  - Avoid hypoglycemia  ID:   - Stopped empiric antibiotics: Aztreonam and Flagyl, Vanc for GPC in light of recent procedure; overall low suspicion that this is profound sepsis and septic shock; Aztreonam and Flagyl added back with fevers and CXR findings cannot rule out aspiration pneumonia. Will plan for 5-7 day course  - Pan culture, will follow       DVT: SCD  Full Code  Updated family at bedside.

## 2025-03-22 NOTE — PROGRESS NOTE ADULT - SUBJECTIVE AND OBJECTIVE BOX
*************************************  NEUROLOGY PROGRESS NOTE  **************************************    TOLU VARGAS  Male  MRN-[id number]    Subjective: Patient seen and examined at bedside with Neurology team and attending     Interval History: The patient was sedated with propofol, versed and fentanyl. on mechanical ventialtion, sometimes breathing over the vents.           PHYSICAL EXAMINATION:        Vital Signs Last 24 Hrs  T(C): 38.3 (22 Mar 2025 08:00), Max: 38.3 (22 Mar 2025 08:00)  T(F): 100.9 (22 Mar 2025 08:00), Max: 100.9 (22 Mar 2025 08:00)  HR: 104 (22 Mar 2025 08:15) (94 - 109)  BP: --  BP(mean): --  RR: 18 (22 Mar 2025 08:15) (8 - 31)  SpO2: 97% (22 Mar 2025 08:15) (96% - 98%)    Parameters below as of 22 Mar 2025 08:00  Patient On (Oxygen Delivery Method): ventilator        Altered mental status - On sedation (Sedated)	  - on propofol, fentanyl and versed, on MV  - Pupils B/L equal 2mm sluggishly reacting to light  - no response to pain  -Not Following commands   -Tone - decreased throughoput   - DTRs not tested  -Corneal reflex present and cough reflex present      LABS:    CBC                       8.4    21.49 )-----------( 168      ( 22 Mar 2025 00:11 )             22.9     Chem 03-22    137  |  96[L]  |  82[H]  ----------------------------<  102[H]  4.8   |  23  |  3.68[H]    Ca    8.8      22 Mar 2025 00:11  Phos  4.9     03-22  Mg     1.90     03-22    TPro  5.0[L]  /  Alb  2.7[L]  /  TBili  14.7[H]  /  DBili  12.5[H]  /  AST  553[H]  /  ALT  288[H]  /  AlkPhos  96  03-22    LFTs LIVER FUNCTIONS - ( 22 Mar 2025 00:11 )  Alb: 2.7 g/dL / Pro: 5.0 g/dL / ALK PHOS: 96 U/L / ALT: 288 U/L / AST: 553 U/L / GGT: x           Coagulopathy PT/INR - ( 22 Mar 2025 00:11 )   PT: 15.7 sec;   INR: 1.32 ratio         PTT - ( 22 Mar 2025 00:11 )  PTT:31.2 sec  Lipid Panel   A1c   Cardiac enzymes CARDIAC MARKERS ( 20 Mar 2025 10:04 )  x     / x     / x     / x     / 1.1 ng/mL      U/A Urinalysis Basic - ( 22 Mar 2025 00:11 )    Color: x / Appearance: x / SG: x / pH: x  Gluc: 102 mg/dL / Ketone: x  / Bili: x / Urobili: x   Blood: x / Protein: x / Nitrite: x   Leuk Esterase: x / RBC: x / WBC x   Sq Epi: x / Non Sq Epi: x / Bacteria: x      CSF  Immunological  Other      RADIOLOGY & ADDITIONAL STUDIES:      CT Head No Cont:  (20 Mar 2025 13:59)--arrest was 09:30 so 4.5 hours later  FINDINGS:   CT dated 11/10/2024 available for review.  The brain demonstrates mild loss of gray-white differentiation which may indicate hypoxic ischemic brain injury.   No acute cerebral cortical infarct is seen.  No intracranial hemorrhage is found.  No mass effect is found in the brain. The ventricles, sulci and basal cisterns appear unremarkable. The orbits are unremarkable.  The paranasal sinuses are clear.  The nasal   cavity appears intact.  The nasopharynx is symmetric.  The central skull base, petrous temporal bones and calvarium remain intact.   IMPRESSION:   Mild loss of gray-white differentiation which may indicate hypoxic ischemic brain injury.

## 2025-03-22 NOTE — PROGRESS NOTE ADULT - SUBJECTIVE AND OBJECTIVE BOX
MICU Progress Note    SUBJECTIVE  INTERVAL EVENTS:  - NAEON    OBJECTIVE  Physical Exam:   PHYSICAL EXAM:  GENERAL: Sedated  HEAD:  Atraumatic, Normocephalic  EYES: EOMI, PERRLA, conjunctiva and sclera clear  NECK: Supple, no lymphadenopathy, no JVD  CHEST/LUNG: CTAB; No wheezes, rales, or rhonchi  HEART: Regular rate and rhythm. Normal S1/S2. No murmurs, rubs, or gallops  ABDOMEN: Soft, non-tender, non-distended; normal bowel sounds, no organomegaly  EXTREMITIES:  L extremity with swelling and bullae   NEUROLOGY: Sedated  SKIN: No rashes or lesions    ICU Vital Signs Last 24 Hrs  T(F): 100.6 (22 Mar 2025 04:00), Max: 100.8 (21 Mar 2025 20:00)  HR: 109 (22 Mar 2025 06:56) (94 - 109)  BP: --  BP(mean): --  ABP: 112/62 (22 Mar 2025 06:00) (85/52 - 122/67)  ABP(mean): 77 (22 Mar 2025 06:00) (62 - 84)  RR: 18 (22 Mar 2025 06:00) (8 - 31)  SpO2: 97% (22 Mar 2025 06:56) (96% - 100%)    I&O's Summary    20 Mar 2025 07:01  -  21 Mar 2025 07:00  --------------------------------------------------------  IN: 6731.2 mL / OUT: 600 mL / NET: 6131.2 mL    21 Mar 2025 07:01  -  22 Mar 2025 06:57  --------------------------------------------------------  IN: 2210.5 mL / OUT: 290 mL / NET: 1920.5 mL      Mode: AC/ CMV (Assist Control/ Continuous Mandatory Ventilation)  RR (machine): 17  TV (machine): 550  FiO2: 45  PEEP: 6  ITime: 0.87  MAP: 13  PIP: 33    ECMO Day#     Adult Advanced Hemodynamics Last 24 Hrs  CVP(mm Hg): --  CVP(cm H2O): --  CO: --  CI: --  PA: --  PA(mean): --  PCWP: --  SVR: --  SVRI: --  PVR: --  PVRI: --    ECMO SETTINGS:  Type:		[ ] Venovenous		[ ] Venoarterial  Cannulation Site(s):     Flow:  	        RPM: 		    Oxygenator:	Sweep:     L/min	FiO2:        LABS:                        8.4    21.49 )-----------( 168      ( 22 Mar 2025 00:11 )             22.9     03-22    137  |  96[L]  |  82[H]  ----------------------------<  102[H]  4.8   |  23  |  3.68[H]    Ca    8.8      22 Mar 2025 00:11  Phos  4.9     03-22  Mg     1.90     03-22    TPro  5.0[L]  /  Alb  2.7[L]  /  TBili  14.7[H]  /  DBili  12.5[H]  /  AST  553[H]  /  ALT  288[H]  /  AlkPhos  96  03-22    PT/INR - ( 22 Mar 2025 00:11 )   PT: 15.7 sec;   INR: 1.32 ratio         PTT - ( 22 Mar 2025 00:11 )  PTT:31.2 sec  ABG - ( 22 Mar 2025 00:11 )  pH, Arterial: 7.41  pH, Blood: x     /  pCO2: 39    /  pO2: 105   / HCO3: 25    / Base Excess: 0.1   /  SaO2: 98.3                  Urinalysis Basic - ( 22 Mar 2025 00:11 )    Color: x / Appearance: x / SG: x / pH: x  Gluc: 102 mg/dL / Ketone: x  / Bili: x / Urobili: x   Blood: x / Protein: x / Nitrite: x   Leuk Esterase: x / RBC: x / WBC x   Sq Epi: x / Non Sq Epi: x / Bacteria: x      CARDIAC MARKERS ( 20 Mar 2025 10:04 )  x     / x     / x     / x     / 1.1 ng/mL      CAPILLARY BLOOD GLUCOSE      POCT Blood Glucose.: 111 mg/dL (22 Mar 2025 05:27)  POCT Blood Glucose.: 110 mg/dL (21 Mar 2025 18:35)  POCT Blood Glucose.: 115 mg/dL (21 Mar 2025 14:42)  POCT Blood Glucose.: 115 mg/dL (21 Mar 2025 10:17)      Culture - Blood (collected 20 Mar 2025 10:04)  Source: Blood Blood-Peripheral  Preliminary Report (21 Mar 2025 13:02):    No growth at 24 hours        RADIOLOGY & ADDITIONAL TESTS:  Other Labs  Imaging Personally Reviewed: No interval imaging  Electrocardiogram Personally Reviewed: No interval imaging    Medications and Allergies:   MEDICATIONS  (STANDING):  allopurinol 50 milliGRAM(s) Oral <User Schedule>  chlorhexidine 0.12% Liquid 15 milliLiter(s) Oral Mucosa every 12 hours  chlorhexidine 2% Cloths 1 Application(s) Topical daily  deferasirox Tablet 720 milliGRAM(s) Oral daily  fentaNYL   Infusion. 0.5 MICROgram(s)/kG/Hr (4.44 mL/Hr) IV Continuous <Continuous>  folic acid 1 milliGRAM(s) Oral daily  influenza   Vaccine 0.5 milliLiter(s) IntraMuscular once  levETIRAcetam   Injectable 750 milliGRAM(s) IV Push daily  midazolam Infusion. 0.1 mG/kG/Hr (8.88 mL/Hr) IV Continuous <Continuous>  norepinephrine Infusion 1 MICROgram(s)/kG/Min (167 mL/Hr) IV Continuous <Continuous>  petrolatum Ophthalmic Ointment 1 Application(s) Both EYES two times a day  propofol Infusion 25 MICROgram(s)/kG/Min (13.3 mL/Hr) IV Continuous <Continuous>    MEDICATIONS  (PRN):      Antimicrobials    vancomycin  IVPB 1000 milliGRAM(s) IV Intermittent once, 03-20-25 @ 11:53, STAT, Stop order after: 1 Doses    aztreonam  IVPB 2000 milliGRAM(s) IV Intermittent <User Schedule>, 03-20-25 @ 11:53, STAT, Stop order after: 7 Days  metroNIDAZOLE  IVPB 500 milliGRAM(s) IV Intermittent every 8 hours, 03-20-25 @ 11:53, STAT        Allergies    penicillin (Pruritus)  hydroxyurea (Other)  piperacillin-tazobactam (Urticaria)  ceftriaxone (Anaphylaxis)    Intolerances     MICU Progress Note    SUBJECTIVE  INTERVAL EVENTS:  - Febrile 100.9, given tylenol  - Anuric w/ bumex challenge     OBJECTIVE  Physical Exam:   PHYSICAL EXAM:  GENERAL: Sedated  HEAD:  Atraumatic, Normocephalic  EYES: constricted, reactive minimally   NECK: Supple, no lymphadenopathy, no JVD  CHEST/LUNG: decreased bs on L side   HEART: Regular rate and rhythm. Normal S1/S2. No murmurs, rubs, or gallops  ABDOMEN: Soft, non-tender, non-distended; normal bowel sounds, no organomegaly  EXTREMITIES:  L extremity with swelling and bullae   NEUROLOGY: Sedated    ICU Vital Signs Last 24 Hrs  T(F): 100.6 (22 Mar 2025 04:00), Max: 100.8 (21 Mar 2025 20:00)  HR: 109 (22 Mar 2025 06:56) (94 - 109)  BP: --  BP(mean): --  ABP: 112/62 (22 Mar 2025 06:00) (85/52 - 122/67)  ABP(mean): 77 (22 Mar 2025 06:00) (62 - 84)  RR: 18 (22 Mar 2025 06:00) (8 - 31)  SpO2: 97% (22 Mar 2025 06:56) (96% - 100%)    I&O's Summary    20 Mar 2025 07:01  -  21 Mar 2025 07:00  --------------------------------------------------------  IN: 6731.2 mL / OUT: 600 mL / NET: 6131.2 mL    21 Mar 2025 07:01  -  22 Mar 2025 06:57  --------------------------------------------------------  IN: 2210.5 mL / OUT: 290 mL / NET: 1920.5 mL      Mode: AC/ CMV (Assist Control/ Continuous Mandatory Ventilation)  RR (machine): 17  TV (machine): 550  FiO2: 45  PEEP: 6  ITime: 0.87  MAP: 13  PIP: 33    ECMO Day#     Adult Advanced Hemodynamics Last 24 Hrs  CVP(mm Hg): --  CVP(cm H2O): --  CO: --  CI: --  PA: --  PA(mean): --  PCWP: --  SVR: --  SVRI: --  PVR: --  PVRI: --    ECMO SETTINGS:  Type:		[ ] Venovenous		[ ] Venoarterial  Cannulation Site(s):     Flow:  	        RPM: 		    Oxygenator:	Sweep:     L/min	FiO2:        LABS:                        8.4    21.49 )-----------( 168      ( 22 Mar 2025 00:11 )             22.9     03-22    137  |  96[L]  |  82[H]  ----------------------------<  102[H]  4.8   |  23  |  3.68[H]    Ca    8.8      22 Mar 2025 00:11  Phos  4.9     03-22  Mg     1.90     03-22    TPro  5.0[L]  /  Alb  2.7[L]  /  TBili  14.7[H]  /  DBili  12.5[H]  /  AST  553[H]  /  ALT  288[H]  /  AlkPhos  96  03-22    PT/INR - ( 22 Mar 2025 00:11 )   PT: 15.7 sec;   INR: 1.32 ratio         PTT - ( 22 Mar 2025 00:11 )  PTT:31.2 sec  ABG - ( 22 Mar 2025 00:11 )  pH, Arterial: 7.41  pH, Blood: x     /  pCO2: 39    /  pO2: 105   / HCO3: 25    / Base Excess: 0.1   /  SaO2: 98.3                  Urinalysis Basic - ( 22 Mar 2025 00:11 )    Color: x / Appearance: x / SG: x / pH: x  Gluc: 102 mg/dL / Ketone: x  / Bili: x / Urobili: x   Blood: x / Protein: x / Nitrite: x   Leuk Esterase: x / RBC: x / WBC x   Sq Epi: x / Non Sq Epi: x / Bacteria: x      CARDIAC MARKERS ( 20 Mar 2025 10:04 )  x     / x     / x     / x     / 1.1 ng/mL      CAPILLARY BLOOD GLUCOSE      POCT Blood Glucose.: 111 mg/dL (22 Mar 2025 05:27)  POCT Blood Glucose.: 110 mg/dL (21 Mar 2025 18:35)  POCT Blood Glucose.: 115 mg/dL (21 Mar 2025 14:42)  POCT Blood Glucose.: 115 mg/dL (21 Mar 2025 10:17)      Culture - Blood (collected 20 Mar 2025 10:04)  Source: Blood Blood-Peripheral  Preliminary Report (21 Mar 2025 13:02):    No growth at 24 hours        RADIOLOGY & ADDITIONAL TESTS:  Other Labs  Imaging Personally Reviewed: No interval imaging  Electrocardiogram Personally Reviewed: No interval imaging    Medications and Allergies:   MEDICATIONS  (STANDING):  allopurinol 50 milliGRAM(s) Oral <User Schedule>  chlorhexidine 0.12% Liquid 15 milliLiter(s) Oral Mucosa every 12 hours  chlorhexidine 2% Cloths 1 Application(s) Topical daily  deferasirox Tablet 720 milliGRAM(s) Oral daily  fentaNYL   Infusion. 0.5 MICROgram(s)/kG/Hr (4.44 mL/Hr) IV Continuous <Continuous>  folic acid 1 milliGRAM(s) Oral daily  influenza   Vaccine 0.5 milliLiter(s) IntraMuscular once  levETIRAcetam   Injectable 750 milliGRAM(s) IV Push daily  midazolam Infusion. 0.1 mG/kG/Hr (8.88 mL/Hr) IV Continuous <Continuous>  norepinephrine Infusion 1 MICROgram(s)/kG/Min (167 mL/Hr) IV Continuous <Continuous>  petrolatum Ophthalmic Ointment 1 Application(s) Both EYES two times a day  propofol Infusion 25 MICROgram(s)/kG/Min (13.3 mL/Hr) IV Continuous <Continuous>    MEDICATIONS  (PRN):      Antimicrobials    vancomycin  IVPB 1000 milliGRAM(s) IV Intermittent once, 03-20-25 @ 11:53, STAT, Stop order after: 1 Doses    aztreonam  IVPB 2000 milliGRAM(s) IV Intermittent <User Schedule>, 03-20-25 @ 11:53, STAT, Stop order after: 7 Days  metroNIDAZOLE  IVPB 500 milliGRAM(s) IV Intermittent every 8 hours, 03-20-25 @ 11:53, STAT        Allergies    penicillin (Pruritus)  hydroxyurea (Other)  piperacillin-tazobactam (Urticaria)  ceftriaxone (Anaphylaxis)    Intolerances

## 2025-03-22 NOTE — PROGRESS NOTE ADULT - ATTENDING COMMENTS
Detailed neuro exam:  ROS: Unable to obtain due to the patient is currently orally intubated.  Please note, neuro exam is very limited due to the patient on mechanical ventilation via orally intubated and on the propofol and versed.  General: Patient is comfortably lying on bed without any acute distress.  Mental status: On verbal command, patient unable to follow any simple or complex commands.  Cranial exams: Brainstem reflexes are intact cornea, conjunctiva and gag reflexes. Face looks symmetric. Pupils are symmetric, reactive to light bilaterally.  Power: On noxious stimuli the patient had 0/5 bilateral four extremities.  Sensation: On noxious stimuli patient did not grimace at all four extremities.  Coordination and gait: Patient did not participate in the setting of comatose.     Impression and plan:  Ms. Downey is a 44 year old man unknown handed man with PMHX sickle cell prior acute chest syndrome, iron overload, Gout, HTN, RUE DVT (was on AC), renal lesion who was admitted due to the sickle cell crisis, received pRBCs and  s/p L ureteroscopy w/ biopsy and stent placement.  hospital course complicated by ams/hypoglycemia and code blue 3/20 ~930am for PEA arrest.  Neurology consulted because of myoclonus s/p cardiac arrest. During my evaluation, patient remains on sedation and neuro assessment is limited. Electroclinically, patient remains myoclonus seizure free after  21:00 .   CT scan head to my shows loss of gray-white differentiation ? early sing of hypoxic ischemic brain injury.  Patient would benefit from MRI brain with and without contrast once patient stable.  Continue VEEG  Continue Keppra 750 mg BID.   Continue medical management, neuro- check and fall precaution.  GI and DVT prophylaxis.    Patient is at high risk for complications and morbidity or mortality. There is high probability of imminent or life threatening deterioration in the patient's condition.  Patient is unable or incompetent to participate in giving a history and/or making decisions and discussion is necessary for determining treatment decisions.    I discussed the diagnosis, treatment plan and prognosis with the patient's family. Risk benefit and alternatives to the treatment were discussed. All questions and concerns were addressed. The patient's family demonstrated good understanding of the treatment plan.    My cumulative time taking care of this critically ill patient is 60 minutes  If you have any further questions, please do not hesitate to contact our team.  Thank you for allowing us to participate in this patient care.      Josiah Smith MD .

## 2025-03-23 LAB
ADD ON TEST-SPECIMEN IN LAB: SIGNIFICANT CHANGE UP
ALBUMIN SERPL ELPH-MCNC: 2.6 G/DL — LOW (ref 3.3–5)
ALP SERPL-CCNC: 111 U/L — SIGNIFICANT CHANGE UP (ref 40–120)
ALT FLD-CCNC: 188 U/L — HIGH (ref 4–41)
ANION GAP SERPL CALC-SCNC: 20 MMOL/L — HIGH (ref 7–14)
APTT BLD: 29.9 SEC — SIGNIFICANT CHANGE UP (ref 24.5–35.6)
AST SERPL-CCNC: 321 U/L — HIGH (ref 4–40)
BASOPHILS # BLD AUTO: 0.08 K/UL — SIGNIFICANT CHANGE UP (ref 0–0.2)
BASOPHILS # BLD AUTO: 0.09 K/UL — SIGNIFICANT CHANGE UP (ref 0–0.2)
BASOPHILS NFR BLD AUTO: 0.4 % — SIGNIFICANT CHANGE UP (ref 0–2)
BASOPHILS NFR BLD AUTO: 0.6 % — SIGNIFICANT CHANGE UP (ref 0–2)
BILIRUB DIRECT SERPL-MCNC: 13.2 MG/DL — HIGH (ref 0–0.3)
BILIRUB SERPL-MCNC: 14.5 MG/DL — HIGH (ref 0.2–1.2)
BLOOD GAS ARTERIAL COMPREHENSIVE RESULT: SIGNIFICANT CHANGE UP
BUN SERPL-MCNC: 91 MG/DL — HIGH (ref 7–23)
BUN SERPL-MCNC: 93 MG/DL — HIGH (ref 7–23)
BUN SERPL-MCNC: 94 MG/DL — HIGH (ref 7–23)
CA-I BLD-SCNC: 1.13 MMOL/L — LOW (ref 1.15–1.29)
CALCIUM SERPL-MCNC: 9 MG/DL — SIGNIFICANT CHANGE UP (ref 8.4–10.5)
CHLORIDE SERPL-SCNC: 93 MMOL/L — LOW (ref 98–107)
CHLORIDE SERPL-SCNC: 93 MMOL/L — LOW (ref 98–107)
CHLORIDE SERPL-SCNC: 96 MMOL/L — LOW (ref 98–107)
CK SERPL-CCNC: 251 U/L — HIGH (ref 30–200)
CO2 SERPL-SCNC: 20 MMOL/L — LOW (ref 22–31)
CREAT SERPL-MCNC: 4.84 MG/DL — HIGH (ref 0.5–1.3)
CREAT SERPL-MCNC: 4.94 MG/DL — HIGH (ref 0.5–1.3)
CREAT SERPL-MCNC: 5.32 MG/DL — HIGH (ref 0.5–1.3)
EGFR: 13 ML/MIN/1.73M2 — LOW
EGFR: 13 ML/MIN/1.73M2 — LOW
EGFR: 14 ML/MIN/1.73M2 — LOW
EOSINOPHIL # BLD AUTO: 0.03 K/UL — SIGNIFICANT CHANGE UP (ref 0–0.5)
EOSINOPHIL # BLD AUTO: 0.08 K/UL — SIGNIFICANT CHANGE UP (ref 0–0.5)
EOSINOPHIL NFR BLD AUTO: 0.2 % — SIGNIFICANT CHANGE UP (ref 0–6)
EOSINOPHIL NFR BLD AUTO: 0.4 % — SIGNIFICANT CHANGE UP (ref 0–6)
GLUCOSE SERPL-MCNC: 107 MG/DL — HIGH (ref 70–99)
GLUCOSE SERPL-MCNC: 117 MG/DL — HIGH (ref 70–99)
GLUCOSE SERPL-MCNC: 133 MG/DL — HIGH (ref 70–99)
HAPTOGLOB SERPL-MCNC: <20 MG/DL — LOW (ref 34–200)
HCT VFR BLD CALC: 22.9 % — LOW (ref 39–50)
HCT VFR BLD CALC: 23 % — LOW (ref 39–50)
HGB BLD-MCNC: 8.2 G/DL — LOW (ref 13–17)
HGB BLD-MCNC: 8.4 G/DL — LOW (ref 13–17)
IANC: 11.49 K/UL — HIGH (ref 1.8–7.4)
IANC: 14.02 K/UL — HIGH (ref 1.8–7.4)
IMM GRANULOCYTES NFR BLD AUTO: 0.6 % — SIGNIFICANT CHANGE UP (ref 0–0.9)
IMM GRANULOCYTES NFR BLD AUTO: 0.7 % — SIGNIFICANT CHANGE UP (ref 0–0.9)
INR BLD: 1.12 RATIO — SIGNIFICANT CHANGE UP (ref 0.85–1.16)
LDH SERPL L TO P-CCNC: 1551 U/L — HIGH (ref 135–225)
LYMPHOCYTES # BLD AUTO: 1.66 K/UL — SIGNIFICANT CHANGE UP (ref 1–3.3)
LYMPHOCYTES # BLD AUTO: 11.2 % — LOW (ref 13–44)
LYMPHOCYTES # BLD AUTO: 11.3 % — LOW (ref 13–44)
LYMPHOCYTES # BLD AUTO: 2.05 K/UL — SIGNIFICANT CHANGE UP (ref 1–3.3)
MAGNESIUM SERPL-MCNC: 2.4 MG/DL — SIGNIFICANT CHANGE UP (ref 1.6–2.6)
MAGNESIUM SERPL-MCNC: 2.4 MG/DL — SIGNIFICANT CHANGE UP (ref 1.6–2.6)
MCHC RBC-ENTMCNC: 29.9 PG — SIGNIFICANT CHANGE UP (ref 27–34)
MCHC RBC-ENTMCNC: 30.5 PG — SIGNIFICANT CHANGE UP (ref 27–34)
MCHC RBC-ENTMCNC: 35.8 G/DL — SIGNIFICANT CHANGE UP (ref 32–36)
MCHC RBC-ENTMCNC: 36.5 G/DL — HIGH (ref 32–36)
MCV RBC AUTO: 83.6 FL — SIGNIFICANT CHANGE UP (ref 80–100)
MCV RBC AUTO: 83.6 FL — SIGNIFICANT CHANGE UP (ref 80–100)
MONOCYTES # BLD AUTO: 1.45 K/UL — HIGH (ref 0–0.9)
MONOCYTES # BLD AUTO: 1.74 K/UL — HIGH (ref 0–0.9)
MONOCYTES NFR BLD AUTO: 9.6 % — SIGNIFICANT CHANGE UP (ref 2–14)
MONOCYTES NFR BLD AUTO: 9.8 % — SIGNIFICANT CHANGE UP (ref 2–14)
NEUTROPHILS # BLD AUTO: 11.49 K/UL — HIGH (ref 1.8–7.4)
NEUTROPHILS # BLD AUTO: 14.02 K/UL — HIGH (ref 1.8–7.4)
NEUTROPHILS NFR BLD AUTO: 77.5 % — HIGH (ref 43–77)
NEUTROPHILS NFR BLD AUTO: 77.7 % — HIGH (ref 43–77)
NRBC # BLD AUTO: 0.1 K/UL — HIGH (ref 0–0)
NRBC # BLD AUTO: 0.11 K/UL — HIGH (ref 0–0)
NRBC # FLD: 0.1 K/UL — HIGH (ref 0–0)
NRBC # FLD: 0.11 K/UL — HIGH (ref 0–0)
NRBC BLD AUTO-RTO: 0 /100 WBCS — SIGNIFICANT CHANGE UP (ref 0–0)
NRBC BLD AUTO-RTO: 0 /100 WBCS — SIGNIFICANT CHANGE UP (ref 0–0)
PHOSPHATE SERPL-MCNC: 4.8 MG/DL — HIGH (ref 2.5–4.5)
PHOSPHATE SERPL-MCNC: 5 MG/DL — HIGH (ref 2.5–4.5)
PLATELET # BLD AUTO: 162 K/UL — SIGNIFICANT CHANGE UP (ref 150–400)
PLATELET # BLD AUTO: 174 K/UL — SIGNIFICANT CHANGE UP (ref 150–400)
POTASSIUM SERPL-MCNC: 4.7 MMOL/L — SIGNIFICANT CHANGE UP (ref 3.5–5.3)
POTASSIUM SERPL-MCNC: 4.9 MMOL/L — SIGNIFICANT CHANGE UP (ref 3.5–5.3)
POTASSIUM SERPL-MCNC: 4.9 MMOL/L — SIGNIFICANT CHANGE UP (ref 3.5–5.3)
POTASSIUM SERPL-SCNC: 4.7 MMOL/L — SIGNIFICANT CHANGE UP (ref 3.5–5.3)
POTASSIUM SERPL-SCNC: 4.9 MMOL/L — SIGNIFICANT CHANGE UP (ref 3.5–5.3)
POTASSIUM SERPL-SCNC: 4.9 MMOL/L — SIGNIFICANT CHANGE UP (ref 3.5–5.3)
PROT SERPL-MCNC: 5.4 G/DL — LOW (ref 6–8.3)
PROTHROM AB SERPL-ACNC: 13 SEC — SIGNIFICANT CHANGE UP (ref 9.9–13.4)
RBC # BLD: 2.74 M/UL — LOW (ref 4.2–5.8)
RBC # BLD: 2.75 M/UL — LOW (ref 4.2–5.8)
RBC # FLD: 18.1 % — HIGH (ref 10.3–14.5)
RBC # FLD: 18.4 % — HIGH (ref 10.3–14.5)
SODIUM SERPL-SCNC: 133 MMOL/L — LOW (ref 135–145)
SODIUM SERPL-SCNC: 133 MMOL/L — LOW (ref 135–145)
SODIUM SERPL-SCNC: 136 MMOL/L — SIGNIFICANT CHANGE UP (ref 135–145)
URATE SERPL-MCNC: 10.7 MG/DL — HIGH (ref 3.4–8.8)
WBC # BLD: 14.83 K/UL — HIGH (ref 3.8–10.5)
WBC # BLD: 18.08 K/UL — HIGH (ref 3.8–10.5)
WBC # FLD AUTO: 14.83 K/UL — HIGH (ref 3.8–10.5)
WBC # FLD AUTO: 18.08 K/UL — HIGH (ref 3.8–10.5)

## 2025-03-23 PROCEDURE — 95718 EEG PHYS/QHP 2-12 HR W/VEEG: CPT

## 2025-03-23 PROCEDURE — 99233 SBSQ HOSP IP/OBS HIGH 50: CPT | Mod: GC

## 2025-03-23 PROCEDURE — 99291 CRITICAL CARE FIRST HOUR: CPT | Mod: GC

## 2025-03-23 PROCEDURE — 76705 ECHO EXAM OF ABDOMEN: CPT | Mod: 26

## 2025-03-23 PROCEDURE — 99232 SBSQ HOSP IP/OBS MODERATE 35: CPT

## 2025-03-23 RX ORDER — BUMETANIDE 1 MG/1
4 TABLET ORAL ONCE
Refills: 0 | Status: COMPLETED | OUTPATIENT
Start: 2025-03-23 | End: 2025-03-23

## 2025-03-23 RX ORDER — BUMETANIDE 1 MG/1
4 TABLET ORAL ONCE
Refills: 0 | Status: DISCONTINUED | OUTPATIENT
Start: 2025-03-23 | End: 2025-03-23

## 2025-03-23 RX ORDER — IPRATROPIUM BROMIDE AND ALBUTEROL SULFATE .5; 2.5 MG/3ML; MG/3ML
3 SOLUTION RESPIRATORY (INHALATION) EVERY 6 HOURS
Refills: 0 | Status: DISCONTINUED | OUTPATIENT
Start: 2025-03-23 | End: 2025-03-24

## 2025-03-23 RX ADMIN — AZTREONAM 200 MILLIGRAM(S): 2 INJECTION, POWDER, LYOPHILIZED, FOR SOLUTION INTRAMUSCULAR; INTRAVENOUS at 13:14

## 2025-03-23 RX ADMIN — LEVETIRACETAM 500 MILLIGRAM(S): 10 INJECTION, SOLUTION INTRAVENOUS at 05:15

## 2025-03-23 RX ADMIN — NOREPINEPHRINE BITARTRATE 167 MICROGRAM(S)/KG/MIN: 8 SOLUTION at 19:09

## 2025-03-23 RX ADMIN — LEVETIRACETAM 500 MILLIGRAM(S): 10 INJECTION, SOLUTION INTRAVENOUS at 21:04

## 2025-03-23 RX ADMIN — Medication 100 MILLIGRAM(S): at 21:04

## 2025-03-23 RX ADMIN — Medication 100 MILLIGRAM(S): at 13:15

## 2025-03-23 RX ADMIN — LEVETIRACETAM 500 MILLIGRAM(S): 10 INJECTION, SOLUTION INTRAVENOUS at 13:15

## 2025-03-23 RX ADMIN — IPRATROPIUM BROMIDE AND ALBUTEROL SULFATE 3 MILLILITER(S): .5; 2.5 SOLUTION RESPIRATORY (INHALATION) at 20:56

## 2025-03-23 RX ADMIN — Medication 1 APPLICATION(S): at 05:14

## 2025-03-23 RX ADMIN — DEFERASIROX 720 MILLIGRAM(S): 90 TABLET, FILM COATED ORAL at 12:57

## 2025-03-23 RX ADMIN — Medication 1 APPLICATION(S): at 12:57

## 2025-03-23 RX ADMIN — Medication 15 MILLILITER(S): at 18:24

## 2025-03-23 RX ADMIN — Medication 15 MILLILITER(S): at 05:14

## 2025-03-23 RX ADMIN — IPRATROPIUM BROMIDE AND ALBUTEROL SULFATE 3 MILLILITER(S): .5; 2.5 SOLUTION RESPIRATORY (INHALATION) at 16:14

## 2025-03-23 RX ADMIN — Medication 4 MILLILITER(S): at 20:56

## 2025-03-23 RX ADMIN — Medication 100 MILLIGRAM(S): at 05:15

## 2025-03-23 RX ADMIN — PROPOFOL 13.3 MICROGRAM(S)/KG/MIN: 10 INJECTION, EMULSION INTRAVENOUS at 19:08

## 2025-03-23 RX ADMIN — FOLIC ACID 1 MILLIGRAM(S): 1 TABLET ORAL at 12:57

## 2025-03-23 RX ADMIN — BUMETANIDE 132 MILLIGRAM(S): 1 TABLET ORAL at 21:04

## 2025-03-23 RX ADMIN — Medication 4.44 MICROGRAM(S)/KG/HR: at 19:08

## 2025-03-23 RX ADMIN — Medication 1 APPLICATION(S): at 18:24

## 2025-03-23 NOTE — PROGRESS NOTE ADULT - ATTENDING COMMENTS
During my evaluation, patient's family member including brother were present and I appreciate their involvement and support.    Detailed neuro exam:  ROS: Unable to obtain due to the patient is currently orally intubated.  Please note, neuro exam is very limited due to the patient on mechanical ventilation via orally intubated and on the propofol.  General: Patient is comfortably lying on bed without any acute distress.  Mental status: On verbal command, patient unable to follow any simple or complex commands.  Cranial exams: Brainstem reflexes are intact cornea, conjunctiva and gag reflexes. Face looks symmetric. Pupils are symmetric, reactive to light bilaterally.  Power: On noxious stimuli the patient had 0/5 bilateral four extremities.  Sensation: On noxious stimuli patient did not grimace at all four extremities.  Coordination and gait: Patient did not participate in the setting of comatose.     Impression and plan:  Ms. Downey is a 44 year old man unknown handed man with PMHX sickle cell prior acute chest syndrome, iron overload, Gout, HTN, RUE DVT (was on AC), renal lesion who was admitted due to the sickle cell crisis, received pRBCs and  s/p L ureteroscopy w/ biopsy and stent placement.  hospital course complicated by ams/hypoglycemia and code blue 3/20 ~930am for PEA arrest.  Neurology consulted because of myoclonus s/p cardiac arrest. During my evaluation, patient remains on sedation and neuro assessment is limited. Electroclinically, patient remains myoclonus seizure free after  21:00 . Clinically unchanged as compared yesterday.   CT scan head to my shows loss of gray-white differentiation ? early sing of hypoxic ischemic brain injury.  Patient would benefit from MRI brain with and without contrast once patient stable.  Continue VEEG  Continue Keppra 750 mg BID.   Continue medical management, neuro- check and fall precaution.  GI and DVT prophylaxis.    Patient is at high risk for complications and morbidity or mortality. There is high probability of imminent or life threatening deterioration in the patient's condition.  Patient is unable or incompetent to participate in giving a history and/or making decisions and discussion is necessary for determining treatment decisions.    I discussed the diagnosis, treatment plan and prognosis with the patient's family. Risk benefit and alternatives to the treatment were discussed. All questions and concerns were addressed. The patient's family demonstrated good understanding of the treatment plan.    My cumulative time taking care of this critically ill patient is 55 minutes  If you have any further questions, please do not hesitate to contact our team.  Thank you for allowing us to participate in this patient care.

## 2025-03-23 NOTE — PROGRESS NOTE ADULT - SUBJECTIVE AND OBJECTIVE BOX
Neurology Progress Note    SUBJECTIVE/OBJECTIVE/INTERVAL EVENTS: Patient seen and examined at bedside w/ neuro attending and team.     INTERVAL HISTORY: No acute events overnight. Pt weaned off of Versed overnight, continues to be on Propofol sedation.     REVIEW OF SYSTEMS: Unable to obtain as pt is intubated and sedated.     VITALS & EXAMINATION:  Vital Signs Last 24 Hrs  T(C): 37.2 (23 Mar 2025 08:00), Max: 38.1 (22 Mar 2025 12:00)  T(F): 99 (23 Mar 2025 08:00), Max: 100.6 (22 Mar 2025 12:00)  HR: 83 (23 Mar 2025 09:00) (83 - 106)  BP: --  BP(mean): --  RR: 20 (23 Mar 2025 09:00) (14 - 24)  SpO2: 98% (23 Mar 2025 09:00) (92% - 100%)    Parameters below as of 23 Mar 2025 08:00  Patient On (Oxygen Delivery Method): ventilator    O2 Concentration (%): 40    Altered mental status - On sedation with Propofol 50mcg/kg/min  - on propofol, fentanyl on MV  - Pupils B/L equal 2mm sluggishly reacting to light  - no response to pain  -Not Following commands   -Tone - decreased throughoput   - DTRs not tested  -Corneal reflex present and cough reflex present      LABORATORY:  CBC                       8.2    14.83 )-----------( 162      ( 23 Mar 2025 01:05 )             22.9     Chem 03-23    133[L]  |  93[L]  |  93[H]  ----------------------------<  133[H]  4.9   |  20[L]  |  4.94[H]    Ca    9.0      23 Mar 2025 05:02  Phos  5.0     03-23  Mg     2.40     03-23    TPro  x   /  Alb  x   /  TBili  x   /  DBili  13.2[H]  /  AST  x   /  ALT  x   /  AlkPhos  x   03-22    LFTs LIVER FUNCTIONS - ( 22 Mar 2025 00:11 )  Alb: 2.7 g/dL / Pro: 5.0 g/dL / ALK PHOS: 96 U/L / ALT: 288 U/L / AST: 553 U/L / GGT: x           Coagulopathy PT/INR - ( 23 Mar 2025 01:05 )   PT: 13.0 sec;   INR: 1.12 ratio         PTT - ( 23 Mar 2025 01:05 )  PTT:29.9 sec  Lipid Panel   A1c   Cardiac enzymes     U/A Urinalysis Basic - ( 23 Mar 2025 05:02 )    Color: x / Appearance: x / SG: x / pH: x  Gluc: 133 mg/dL / Ketone: x  / Bili: x / Urobili: x   Blood: x / Protein: x / Nitrite: x   Leuk Esterase: x / RBC: x / WBC x   Sq Epi: x / Non Sq Epi: x / Bacteria: x      CSF  Immunological  Other    STUDIES & IMAGING: (EEG, CT, MR, U/S, TTE/PA):

## 2025-03-23 NOTE — PROGRESS NOTE ADULT - ASSESSMENT
44year old man PMHX sickle cell prior acute chest syndrome, iron overload, Gout, HTN, RUE DVT (was on AC), renal lesion  -admitted 3/15/25 sickle cell crisis, received pRBCs and  s/p L ureteroscopy w/ biopsy and stent placement on 3/19.  -RRT 3/20 9am for ams/hypoglycemia, code blue 3/20 ~930am for PEA arrest attributed to hypoglycemia. Time to rosc from start of compressions 4 minutes but unknown total down time   -3/20 ~1400 Developed myoclonic movements of head, abdomen, LLE starting 3/20 ~1400, increased in frequency overnight into 3/21. Also in MICU hemorrhage shock due to RP bleed s/p embo with IR  -Neurology consulted for post anoxic myoclonic jerks.   -Vitals (last 24 hours) BP 90s-120s/50s-60s, HR 90s-140s, Afebrile tmax 37.4, RR17-23 satting 93+ on vent save for short periods of hypoxia when clenching on vent.   -Exam 3/21 2:00 on sedation (prop@50) comatose w/ myoclonic movements of head, abdomen, LLE worse with noxious. PERRL but loss of other brainstem function (Dolls eyes, absent corneal, absent gag, synchronous with vent)  -Labs w/ occasional hypoglycemia responsive to IV dextrose, normocytic anemia improved after pRBCs. Marked leukocytosis to 30k (PMN predom) since arrest, MATA, transaminitis,, elevated tbili, Improved severe lactic acidosis. INR normal prior to arrest, increased to 2 and now downtrending. ptt grossly normal. Micro: UA trce  LE, +nit, no WBCs no bacteria. HIV negative.   -CTH (3/20/25 14:00 - only 4.5 hours post arrest) with loss of gray white diffusely     Impression:  -PEA arrest in the setting of hypoglycemia leading to anoxic brain injury and post anoxic myoclonic status epilepticus involving jaw and occasionally impeding vent flow  -Prognosis for meaningful neurological recovery is guarded. Time to ROSC from start of CPR is only 4 minutes but total downtime unknown (<30mins) is overall indeterminant. Early anoxic injury on CT and early post arrest myoclonus are unfavorable. Exam on sedation confounds neuroprognostication.     Recommendations:  Investigations:  -Video EEG   -MRI brain w/o contrast to evaluate for anoxic brain injury on post arrest day 5   -Check Neuron Specific Enolase (NSE) on 24,48, 72 hours   --Please obtain first NSE 24-48 hrs post-cardiac arrest (Between 930am 3/21 and 930am 3/22)  --Please obtain second NSE 24 hours after drawing first NSE     Management:  -GOC per primary team  -C/W Sedation as per primary team, recommend weaning  -If no improvement with increasing midazolam, -Can load levetiracetam 2500mg (~30mg/kg) and continue 500mg Q8 (based on current CrCl of 48.8ml/min) - if kidney functions dramatically worsens will need dose decrease. (VPA another option but avoiding for now given liver issues)    Note finalized upon attestation by the Attending.

## 2025-03-23 NOTE — EEG REPORT - NS EEG TEXT BOX
TOLU VARGAS [id number]     Study Date: 03-22-25 08:00 - 08:00 03-23-25  Duration x Hours: 24 hrs  --------------------------------------------------------------------------------------------------  History:  CC/ HPI Patient is a 44y old  Male who presents with a chief complaint of Referred by Hematologist for low Hg (22 Mar 2025 09:02)    MEDICATIONS  (STANDING):  allopurinol 50 milliGRAM(s) Oral <User Schedule>  aztreonam  IVPB 2000 milliGRAM(s) IV Intermittent <User Schedule>  fentaNYL   Infusion. 0.5 MICROgram(s)/kG/Hr (4.44 mL/Hr) IV Continuous <Continuous>  folic acid 1 milliGRAM(s) Oral daily  influenza   Vaccine 0.5 milliLiter(s) IntraMuscular once  levETIRAcetam   Injectable 750 milliGRAM(s) IV Push daily  metroNIDAZOLE  IVPB 500 milliGRAM(s) IV Intermittent every 8 hours  midazolam Infusion. 0.1 mG/kG/Hr (8.88 mL/Hr) IV Continuous <Continuous>  norepinephrine Infusion 1 MICROgram(s)/kG/Min (167 mL/Hr) IV Continuous <Continuous>  petrolatum Ophthalmic Ointment 1 Application(s) Both EYES two times a day  propofol Infusion 25 MICROgram(s)/kG/Min (13.3 mL/Hr) IV Continuous <Continuous>    --------------------------------------------------------------------------------------------------  Study Interpretation:    [[[Abbreviation Key:  PDR=alpha rhythm/posterior dominant rhythm. A-P=anterior posterior gradient.  Amplitude: ‘very low’:<20; ‘low’:20-50; ‘medium’:; ‘high’:>200uV.  Persistence for periodic/rhythmic patterns (% of epoch) ‘rare’:<1%; ‘occasional’:1-10%; ‘frequent’:10-50%; ‘abundant’:50-90%; ‘continuous’:>90%.  Persistence for sporadic discharges: ‘rare’:<1/hr; ‘occasional’:1/min-1/hr; ‘frequent’:>1/min; ‘abundant’:>1/10 sec.  GRDA=generalized rhythmic delta activity; FIRDA=frontal intermittent GRDA; LRDA=lateralized rhythmic delta activity; TIRDA=temporal intermittent rhythmic delta activity;  LPD=PLED=lateralized periodic discharges; GPD=generalized periodic discharges; BiPDs=BiPLEDs=bilateral independent periodic epileptiform discharges; SIRPID=stimulus induced rhythmic, periodic, or ictal appearing discharges; BIRDs=brief potentially ictal rhythmic discharges >4 Hz, lasting .5-10s; PFA=paroxysmal bursts of beta/gamma; LVFA=low voltage fast activity.  Modifiers: +F=with fast component; +S=with spike component; +R=with rhythmic component.  S-B=burst suppression pattern.  Max=maximal. N1-drowsy; N2-stage II sleep; N3-slow wave sleep. SSS/BETS=small sharp spikes/benign epileptiform transients of sleep. HV=hyperventilation; PS=photic stimulation]]]    Daily EEG Visual Analysis    FINDINGS:      Background:  Continuity: The first half of the recording is characterized by an admixture of alpha and beta frequencies without a clear wakeful state, with gradual attenuation and eventual suppression after around 16:30  Symmetry: symmetric  PDR: not discerned  Reactivity: not discerned  Voltage: suppressed in first half of recording < 10 uV, with later portions showing normal voltage mostly 20-150uV  Anterior Posterior Gradient: absent  Other background findings: none  Breach: absent    Background Slowing:  Generalized slowing: none was present.  Focal slowing: none was present.    State Changes:   -Drowsiness not recorded  -N2 sleep transients were not recorded.    Sporadic Epileptiform Discharges:    See below    Rhythmic and Periodic Patterns (RPPs):  Blunted GPDs, 0.25-0.5 Hz, beginning near 18:00  Intermittent P7 LPDs at around 20:00    Electrographic and Electroclinical seizures:  None    Other Clinical Events:  None    Activation Procedures:   -Hyperventilation was not performed.    -Photic stimulation was not performed.      Artifacts:  Intermittent myogenic and movement artifacts were noted.    ECG:  The heart rate on single channel ECG was predominantly between 70-85 BPM.    EEG Classification / Summary:  Abnormal  EEG in the awake / drowsy / asleep state(s).    1. GPDs, 0.25-5 Hz, present after 18:00  2. Left posterior temporal LPDs at P7, brief, present after 20:00  3. Burst suppression pattern present after 16:30      Clinical Impression:    1. GPDs can be seen in the setting of toxic/metabolic etiologies and can be associated with seizures at frequencies > 2.5 Hz  2. Risk of focal onset seizures in the left posterior temporal region  3. Severe diffuse/multifocal cerebral dysfunction, likely in part due to sedative effect    THIS IS A PRELIMINARY INTERPRETATION ONLY PENDING ATTENDING REVIEW/ATTESTATION    Deonte Ponce DO  Epilepsy Fellow, PGY-5    -------------------------------------------------------------------------------------------------------  [facility] EEG Reading Room Ph#: [phone]  Epilepsy Answering Service after 5PM and before 8:30AM: Ph#: [phone]   TOLU VARGAS [id number]     Study Date: 03-22-25 08:00 - 08:00 03-23-25  Duration x Hours: 24 hrs  --------------------------------------------------------------------------------------------------  History:  CC/ HPI Patient is a 44y old  Male who presents with a chief complaint of Referred by Hematologist for low Hg (22 Mar 2025 09:02)    MEDICATIONS  (STANDING):  allopurinol 50 milliGRAM(s) Oral <User Schedule>  aztreonam  IVPB 2000 milliGRAM(s) IV Intermittent <User Schedule>  fentaNYL   Infusion. 0.5 MICROgram(s)/kG/Hr (4.44 mL/Hr) IV Continuous <Continuous>  folic acid 1 milliGRAM(s) Oral daily  influenza   Vaccine 0.5 milliLiter(s) IntraMuscular once  levETIRAcetam   Injectable 750 milliGRAM(s) IV Push daily  metroNIDAZOLE  IVPB 500 milliGRAM(s) IV Intermittent every 8 hours  midazolam Infusion. 0.1 mG/kG/Hr (8.88 mL/Hr) IV Continuous <Continuous>  norepinephrine Infusion 1 MICROgram(s)/kG/Min (167 mL/Hr) IV Continuous <Continuous>  petrolatum Ophthalmic Ointment 1 Application(s) Both EYES two times a day  propofol Infusion 25 MICROgram(s)/kG/Min (13.3 mL/Hr) IV Continuous <Continuous>    --------------------------------------------------------------------------------------------------  Study Interpretation:    [[[Abbreviation Key:  PDR=alpha rhythm/posterior dominant rhythm. A-P=anterior posterior gradient.  Amplitude: ‘very low’:<20; ‘low’:20-50; ‘medium’:; ‘high’:>200uV.  Persistence for periodic/rhythmic patterns (% of epoch) ‘rare’:<1%; ‘occasional’:1-10%; ‘frequent’:10-50%; ‘abundant’:50-90%; ‘continuous’:>90%.  Persistence for sporadic discharges: ‘rare’:<1/hr; ‘occasional’:1/min-1/hr; ‘frequent’:>1/min; ‘abundant’:>1/10 sec.  GRDA=generalized rhythmic delta activity; FIRDA=frontal intermittent GRDA; LRDA=lateralized rhythmic delta activity; TIRDA=temporal intermittent rhythmic delta activity;  LPD=PLED=lateralized periodic discharges; GPD=generalized periodic discharges; BiPDs=BiPLEDs=bilateral independent periodic epileptiform discharges; SIRPID=stimulus induced rhythmic, periodic, or ictal appearing discharges; BIRDs=brief potentially ictal rhythmic discharges >4 Hz, lasting .5-10s; PFA=paroxysmal bursts of beta/gamma; LVFA=low voltage fast activity.  Modifiers: +F=with fast component; +S=with spike component; +R=with rhythmic component.  S-B=burst suppression pattern.  Max=maximal. N1-drowsy; N2-stage II sleep; N3-slow wave sleep. SSS/BETS=small sharp spikes/benign epileptiform transients of sleep. HV=hyperventilation; PS=photic stimulation]]]    Daily EEG Visual Analysis    FINDINGS:      Background:  Continuity: The first half of the recording is characterized by an admixture of alpha and beta frequencies without a clear wakeful state, with gradual attenuation and eventual suppression after around 16:30  Symmetry: symmetric  PDR: not discerned  Reactivity: not discerned  Voltage: suppressed in first half of recording < 10 uV, with later portions showing normal voltage mostly 20-150uV  Anterior Posterior Gradient: absent  Other background findings: none  Breach: absent    Background Slowing:  Generalized slowing: none was present.  Focal slowing: none was present.    State Changes:   -Drowsiness not recorded  -N2 sleep transients were not recorded.    Sporadic Epileptiform Discharges:    See below    Rhythmic and Periodic Patterns (RPPs):  Blunted GPDs, 0.25-0.5 Hz, beginning near 18:00  Intermittent P7 LPDs at around 20:00    Electrographic and Electroclinical seizures:  None    Other Clinical Events:  None    Activation Procedures:   -Hyperventilation was not performed.    -Photic stimulation was not performed.      Artifacts:  Intermittent myogenic and movement artifacts were noted.    ECG:  The heart rate on single channel ECG was predominantly between 70-85 BPM.    EEG Classification / Summary:  Abnormal  EEG in the awake / drowsy / asleep state(s).    1. GPDs, 0.25-5 Hz, present after 18:00  2. Left posterior temporal LPDs at P7, brief, present after 20:00  3. Burst suppression pattern present after 16:30      Clinical Impression:    1. GPDs can be seen in the setting of toxic/metabolic etiologies and can be associated with seizures at frequencies > 2.5 Hz  2. Risk of focal onset seizures in the left posterior temporal region  3. Severe diffuse/multifocal cerebral dysfunction, likely in part due to sedative effect        Deonte Ponce DO  Epilepsy Fellow, PGY-5    -------------------------------------------------------------------------------------------------------  [facility] EEG Reading Room Ph#: [phone]  Epilepsy Answering Service after 5PM and before 8:30AM: Ph#: [phone]

## 2025-03-23 NOTE — PROGRESS NOTE ADULT - ASSESSMENT
Patient is 44yMale with PMHx of sickle cell disease admitted for anemia concerning for sickle cell crisis s/p ureteroscopy w/ L kidney biopsy, c/c/b PEA arrest of unclear etiology, now intubated and on pressors for possible hemorrhagic shock     NEURO:  #Sedated  - c/w propofol   - C/w versed  - C/w fent     #Seizures  Witness myoclonic jerking concerning for seizures iso anoxic brain injury  s/p 2.5g keppra load  EEG report with severe diffuse/multifocal cerebral dysfunction   - c/w keppra 500mg q8h per neuro   - F/u neuro recommendations  - Trend enolase   - F/u MRI brain to r/o anoxic brain injury     CV:  #Shock  Suspected hemorrhagic shock s/p kidney biopsy  last dose of lovenox AC on 3/18 at 5PM   Hgb 8 -> ~3   - Hemodynamically stable off pressors   - Monitor cbc q24    #Hx of DVT  Likely w/ chronic DVT   - CTM  - Bullae present on arm with DVT, CTM     PULM:  #Acute hypoxic respiratory failure  In the setting of PEA arrest   CXR with R lung field and left mid and lower lung field hazy opacities.  - Abx as below in ID   - Wean vent as tolerated       RENAL:  #MATA  Likely ATN. Currently w/ anuria concerning for acute renal failure   s/p failed bumex challenge   - Dave  - Trend Cr, uop, lytes  - Stop bicarb drip  - Discuss dialysis with family       GI:  #Shock liver  - CTM     #Diet:  - NPO w/ tube feed     HEMATOLOGIC:  #Sickle cell crisis  - Daily hgb, retic, hapto, ldh, bili   - heme following       #DVT prophylaxis   - SCD    Endo:  #JUAN    ID:  Febrile overnight   - Resume aztreonam and flagyl (patient w/ multiple listed allergies)    MSK:  #infiltration  Infiltration of sodium bicarb into left arm, now with arm distension and bullae  - Vascular surgery consulted for compartment syndrome concern  - CTM    Ethics: Full Code  Patient is 44y Male with PMHx of sickle cell disease admitted for anemia concerning for sickle cell crisis s/p ureteroscopy w/ L kidney biopsy 3/19, post-operative course c/b acute hemorrhagic shock, PEA arrest with ROSC, admitted to MICU for further management    NEURO:  #Sedated  - C/w propofol, fent, wean as tolerated   - S/p versed gtt, weaned off 3/23    #Seizures  - Witness myoclonic jerking concerning for seizures iso anoxic brain injury  - s/p 2.5g keppra load  - vEEG report: "Abundant myoclonic seizures in the setting of a severe diffuse/multifocal cerebral dysfunction which can be seen in the setting of sedative effect or due to an anoxic injury, with improvement after 20:50 suggesting a lowering of sedation"; will f/u with neuro recommendations  - c/w keppra 500mg q8h per neuro   - Trend enolase   - F/u MRI brain to eval for anoxic brain injury     CV:  #Shock - hemorrhagic shock s/p kidney biopsy s/p 5u pRBC, 3 plasma, 3 plt  last dose of lovenox AC on 3/18 at 5PM   Hgb 8 -> ~3   - C/w Levo, MAP goal>65  - Monitor cbc q24    #Hx of VTE (R IJ thrombus, L brachial DVT)  - CTM, hold AC given hemorrhage  - Bullae present on arm with DVT,    PULM:  #Acute hypoxic respiratory failure  In the setting of PEA arrest   CXR with R lung field and left mid and lower lung field hazy opacities.  - Abx as below  - Wean vent as able  - HTS nebs, duonebs    RENAL:  #MATA  #Renal mass s/p biopsy  #Acidosis  Likely ATN. Currently w/ anuria concerning for acute renal failure   s/p failed bumex challenge   - Dave  - Trend Cr, uop, lytes  - S/p bicarb drip  - Discuss CRRT/HD with family, no current indication      GI:  #Shock liver  - Trend liver enzymes  - F/u RUQ US    #Diet:  - NPO w/ tube feed     HEMATOLOGIC:  #Sickle cell crisis  #Acute blood loss enamia  - Trend hemolysis labs, coags  - heme following     #DVT prophylaxis - SCD    Endo:  #JUAN    ID:  Febrile overnight, ?aspiration PNA  - C/w aztreonam and flagyl (patient w/ multiple listed allergies) w/ plan for 5-7d course    MSK:  #infiltration  Infiltration of sodium bicarb into left arm, now with arm distension and bullae  - Vascular surgery consulted for compartment syndrome concern  - CTM    Ethics: Full Code

## 2025-03-23 NOTE — PROGRESS NOTE ADULT - ASSESSMENT
This is a 44 year old male with sickle cell disease who presents with low hemoglobin.  s/p left renal bx with stent placement 3/19    1. Sickle cell anemia  -- Baseline hemoglobin outpatient ~6.0, sent in for hemoglobin 5.3   -- Labs consistent with hemolysis- bili 15, LDH 1820 likely multifactorial in setting of sepsis and liver dysfunction, Hgb stable low suspicion for hyperhemolysis, please check Uric acid and G6PD  --trend Hemolysis labs daily  -- CXR neg. CTA chest w/o PE though RUL opacity. Patient is asymptomatic and saturating well on room air.   -- Continue with folic acid, encourage hydration, optimize pain control on IV PCA   -- Monitor CBC and transfuse to maintain hg >6. s/p 4 units 3/20, stable Hgb since    2. R IJ DVT   -- Dx 12/2024,  holding AC  -- Rec repeat US to evaluate clot, and if neg then RUE may be used for access       3. Back pain   -- Typical of his sickle cell pain   -- Outpatient he is on oxycodone 30mg PO q4h    4. Iron overload   -- Cont Jadenu    5. r/o malignancy   -- Prior imaging with concern for urothelial malignancy   -- s/p  L ureteroscopy, RGP, biopsy, stent placement L kidney wash, L kidney lower pole mass biopsy 3/19, will follow pathology    6. RRT  --s/p rapid response for hypoglycemia this AM, and then  at approximately 9:30AM a code blue was called when pt was found unresponsive and chest compressions started. Glucose at this time noted to be 41. ROSC was achieved approximately 4 minutes of compressions, w/ rhythm noted to be PEA. Pt was then intubated at bedside and sinus rhythm was seen on telemetry  -Hgb drop from 8.0--->3.4, s/p massive transfusion 3/19  -s/p right common femoral artery access, Left renal angiogram with irregularity of left lower pole arterial branch, Coil embolization performed with decreased flow. IR recs appreciated  -Intubated and sedated on pressors with myoclonic activity and signs of anoxic brain injury on CTH 3/20/25  -EEG shows abundant myoclonic seizures in the setting of a severe diffuse/multifocal cerebral dysfunction which can be seen in the setting of sedative effect or due to an anoxic injury, with improvement after 20:50 suggesting a lowering of sedation, neuro recs noted and appreciated     7. Fevers  --blood cultures from 3/20 NGTD  --in setting of hemorrhagic shock  --pt may benefit from ID consult      Will continue to follow.    Meghana Real NP  Hematology/Oncology  [facility]  [phone] (Office)  [phone] (Alt office)  Evenings and weekends please call MD on call or office

## 2025-03-23 NOTE — PROGRESS NOTE ADULT - ASSESSMENT
44y M s/p  L ureteroscopy, RGP, biopsy, stent placement L kidney wash, L kidney lower pole mass biopsy on 3/19, RRT and code blue 3/20 now in MICU intubated and sedated on pressors with myoclonic activity and signs of anoxic brain injury on CTH 3/20/25.    3/19: s/p L ureteroscopy, RGP, biopsy, stent placement L kidney wash, L kidney lower pole mass biopsy  3/20: RRT 9a and Code Blue 930a, ROSC achieved in 4 minutes, transferred to MICU intubated and sedated on pressors s/p several pRBC, PLT, and plasma transfusions for Hgb 3.4  3/21: H/H 8.4/22.6, Cr 2.38, 2.85  3/22: off pressors, H/H 8.4/22.9, Cr 3.35, 3.68, lactate 1.5  3/23: H/H 8.2/22.9, back on levo 0.07, Cr 4.9, improved UOP clear yellow    Recommendations:  - Continue daniel catheter, monitor I/O's   - Trend H/H (stable), Cr (uptrending), WBC (downtrending), lactate (cleared 3/22)  - Appreciate neuro recommendations  - Appreciate heme/onc recommendations  - Appreciate excellent MICU care    Seen and d/w Dr. Kendrick [facility]  [address] [place] [phone]

## 2025-03-23 NOTE — PROGRESS NOTE ADULT - SUBJECTIVE AND OBJECTIVE BOX
INTERVAL HPI/OVERNIGHT EVENTS:  - UOP poor, Bumex increased and ultimately d/c as w/o effect    SUBJECTIVE: Patient seen and examined at bedside.     ROS: All negative except as listed above.    VITAL SIGNS:  ICU Vital Signs Last 24 Hrs  T(C): 37.9 (23 Mar 2025 04:00), Max: 38.3 (22 Mar 2025 08:00)  T(F): 100.2 (23 Mar 2025 04:00), Max: 100.9 (22 Mar 2025 08:00)  HR: 91 (23 Mar 2025 06:00) (91 - 106)  BP: --  BP(mean): --  ABP: 104/53 (23 Mar 2025 06:00) (81/46 - 118/66)  ABP(mean): 69 (23 Mar 2025 06:00) (57 - 82)  RR: 17 (23 Mar 2025 06:00) (14 - 24)  SpO2: 97% (23 Mar 2025 06:00) (92% - 100%)    O2 Parameters below as of 23 Mar 2025 06:00  Patient On (Oxygen Delivery Method): ventilator    O2 Concentration (%): 40      Mode: AC/ CMV (Assist Control/ Continuous Mandatory Ventilation), RR (machine): 17, TV (machine): 550, FiO2: 30, PEEP: 6, ITime: 0.9, MAP: 13, PIP: 36  Plateau pressure:   P/F ratio:     03-22 @ 07:01  -  03-23 @ 07:00  --------------------------------------------------------  IN: 2382.5 mL / OUT: 175 mL / NET: 2207.5 mL      CAPILLARY BLOOD GLUCOSE      POCT Blood Glucose.: 106 mg/dL (22 Mar 2025 21:05)      ECG: reviewed.    PHYSICAL EXAM:    GENERAL: NAD, lying in bed comfortably  HEAD:  Atraumatic, normocephalic  EYES: EOMI, PERRLA, conjunctiva and sclera clear  NECK: Supple, trachea midline, no JVD  HEART: Regular rate and rhythm, no murmurs, rubs, or gallops  LUNGS: Unlabored respirations.  Clear to auscultation bilaterally, no crackles, wheezing, or rhonchi  ABDOMEN: Soft, nontender, nondistended, +BS  EXTREMITIES: 2+ peripheral pulses bilaterally, cap refill<2 secs. No clubbing, cyanosis, or edema  NERVOUS SYSTEM:  A&Ox3, following commands, moving all extremities, no focal deficits   SKIN: No rashes or lesions    MEDICATIONS:  MEDICATIONS  (STANDING):  allopurinol 50 milliGRAM(s) Oral <User Schedule>  aztreonam  IVPB 2000 milliGRAM(s) IV Intermittent <User Schedule>  chlorhexidine 0.12% Liquid 15 milliLiter(s) Oral Mucosa every 12 hours  chlorhexidine 2% Cloths 1 Application(s) Topical daily  deferasirox Tablet 720 milliGRAM(s) Oral daily  fentaNYL   Infusion. 0.5 MICROgram(s)/kG/Hr (4.44 mL/Hr) IV Continuous <Continuous>  folic acid 1 milliGRAM(s) Oral daily  influenza   Vaccine 0.5 milliLiter(s) IntraMuscular once  levETIRAcetam   Injectable 500 milliGRAM(s) IV Push <User Schedule>  metroNIDAZOLE  IVPB 500 milliGRAM(s) IV Intermittent every 8 hours  midazolam Infusion. 0.1 mG/kG/Hr (8.88 mL/Hr) IV Continuous <Continuous>  norepinephrine Infusion 1 MICROgram(s)/kG/Min (167 mL/Hr) IV Continuous <Continuous>  petrolatum Ophthalmic Ointment 1 Application(s) Both EYES two times a day  propofol Infusion 25 MICROgram(s)/kG/Min (13.3 mL/Hr) IV Continuous <Continuous>    MEDICATIONS  (PRN):      ALLERGIES:  Allergies    penicillin (Pruritus)  hydroxyurea (Other)  piperacillin-tazobactam (Urticaria)  ceftriaxone (Anaphylaxis)    Intolerances        LABS:                        8.2    14.83 )-----------( 162      ( 23 Mar 2025 01:05 )             22.9     03-23    133[L]  |  93[L]  |  93[H]  ----------------------------<  133[H]  4.9   |  20[L]  |  4.94[H]    Ca    9.0      23 Mar 2025 05:02  Phos  5.0     03-23  Mg     2.40     03-23    TPro  x   /  Alb  x   /  TBili  x   /  DBili  13.2[H]  /  AST  x   /  ALT  x   /  AlkPhos  x   03-22    PT/INR - ( 23 Mar 2025 01:05 )   PT: 13.0 sec;   INR: 1.12 ratio         PTT - ( 23 Mar 2025 01:05 )  PTT:29.9 sec  Urinalysis Basic - ( 23 Mar 2025 05:02 )    Color: x / Appearance: x / SG: x / pH: x  Gluc: 133 mg/dL / Ketone: x  / Bili: x / Urobili: x   Blood: x / Protein: x / Nitrite: x   Leuk Esterase: x / RBC: x / WBC x   Sq Epi: x / Non Sq Epi: x / Bacteria: x      ABG:  pH, Arterial: 7.41 (03-23-25 @ 01:05)  pCO2, Arterial: 36 mmHg (03-23-25 @ 01:05)  pO2, Arterial: 72 mmHg (03-23-25 @ 01:05)      vBG:    Micro:    Culture - Blood (collected 03-20-25 @ 10:04)  Source: Blood Blood-Peripheral  Preliminary Report (03-22-25 @ 13:01):    No growth at 48 Hours        Culture - Sputum (collected 03-22-25 @ 17:15)  Source: ET Tube ET Tube  Gram Stain (03-22-25 @ 23:42):    Numerous polymorphonuclear leukocytes per low power field    Rare Squamous epithelial cells per low power field    No organisms seen per oil power field        RADIOLOGY & ADDITIONAL TESTS: Reviewed. INTERVAL HPI/OVERNIGHT EVENTS:  - UOP minimal, Bumex increased and ultimately discontinued   - Weaned off Versed    SUBJECTIVE: Patient seen and examined at bedside. Intubated, sedated with prop/fent. Unable to obtain ROS 2/2 clinical condition    VITAL SIGNS:  ICU Vital Signs Last 24 Hrs  T(C): 37.9 (23 Mar 2025 04:00), Max: 38.3 (22 Mar 2025 08:00)  T(F): 100.2 (23 Mar 2025 04:00), Max: 100.9 (22 Mar 2025 08:00)  HR: 91 (23 Mar 2025 06:00) (91 - 106)  ABP: 104/53 (23 Mar 2025 06:00) (81/46 - 118/66)  ABP(mean): 69 (23 Mar 2025 06:00) (57 - 82)  RR: 17 (23 Mar 2025 06:00) (14 - 24)  SpO2: 97% (23 Mar 2025 06:00) (92% - 100%)  O2 Parameters below as of 23 Mar 2025 06:00  Patient On (Oxygen Delivery Method): ventilator    O2 Concentration (%): 40    Mode: AC/ CMV (Assist Control/ Continuous Mandatory Ventilation), RR (machine): 17, TV (machine): 550, FiO2: 30, PEEP: 6, ITime: 0.9, MAP: 13, PIP: 36  Plateau pressure:   P/F ratio:     03-22 @ 07:01  -  03-23 @ 07:00  --------------------------------------------------------  IN: 2382.5 mL / OUT: 175 mL / NET: 2207.5 mL      CAPILLARY BLOOD GLUCOSE      POCT Blood Glucose.: 106 mg/dL (22 Mar 2025 21:05)    PHYSICAL EXAM:  GENERAL: intubated, sedated  HEAD: normocephalic, atraumatic  HEENT: normal conjunctiva, oral mucosa moist  CARDIAC: RRR  PULM: mechanical breath sounds  GI: Abd soft, nondistended, nontender, no rebound tenderness, no guarding, no rigidity  : +Dave  NEURO: sedated  MSK: no peripheral edema,  SKIN: well-perfused, extremities warm, bullae over L forearm      MEDICATIONS:  MEDICATIONS  (STANDING):  allopurinol 50 milliGRAM(s) Oral <User Schedule>  aztreonam  IVPB 2000 milliGRAM(s) IV Intermittent <User Schedule>  chlorhexidine 0.12% Liquid 15 milliLiter(s) Oral Mucosa every 12 hours  chlorhexidine 2% Cloths 1 Application(s) Topical daily  deferasirox Tablet 720 milliGRAM(s) Oral daily  fentaNYL   Infusion. 0.5 MICROgram(s)/kG/Hr (4.44 mL/Hr) IV Continuous <Continuous>  folic acid 1 milliGRAM(s) Oral daily  influenza   Vaccine 0.5 milliLiter(s) IntraMuscular once  levETIRAcetam   Injectable 500 milliGRAM(s) IV Push <User Schedule>  metroNIDAZOLE  IVPB 500 milliGRAM(s) IV Intermittent every 8 hours  midazolam Infusion. 0.1 mG/kG/Hr (8.88 mL/Hr) IV Continuous <Continuous>  norepinephrine Infusion 1 MICROgram(s)/kG/Min (167 mL/Hr) IV Continuous <Continuous>  petrolatum Ophthalmic Ointment 1 Application(s) Both EYES two times a day  propofol Infusion 25 MICROgram(s)/kG/Min (13.3 mL/Hr) IV Continuous <Continuous>    MEDICATIONS  (PRN):      ALLERGIES:  Allergies    penicillin (Pruritus)  hydroxyurea (Other)  piperacillin-tazobactam (Urticaria)  ceftriaxone (Anaphylaxis)    Intolerances        LABS:                        8.2    14.83 )-----------( 162      ( 23 Mar 2025 01:05 )             22.9     03-23    133[L]  |  93[L]  |  93[H]  ----------------------------<  133[H]  4.9   |  20[L]  |  4.94[H]    Ca    9.0      23 Mar 2025 05:02  Phos  5.0     03-23  Mg     2.40     03-23    TPro  x   /  Alb  x   /  TBili  x   /  DBili  13.2[H]  /  AST  x   /  ALT  x   /  AlkPhos  x   03-22    PT/INR - ( 23 Mar 2025 01:05 )   PT: 13.0 sec;   INR: 1.12 ratio         PTT - ( 23 Mar 2025 01:05 )  PTT:29.9 sec  Urinalysis Basic - ( 23 Mar 2025 05:02 )    Color: x / Appearance: x / SG: x / pH: x  Gluc: 133 mg/dL / Ketone: x  / Bili: x / Urobili: x   Blood: x / Protein: x / Nitrite: x   Leuk Esterase: x / RBC: x / WBC x   Sq Epi: x / Non Sq Epi: x / Bacteria: x      ABG:  pH, Arterial: 7.41 (03-23-25 @ 01:05)  pCO2, Arterial: 36 mmHg (03-23-25 @ 01:05)  pO2, Arterial: 72 mmHg (03-23-25 @ 01:05)      vBG:    Micro:    Culture - Blood (collected 03-20-25 @ 10:04)  Source: Blood Blood-Peripheral  Preliminary Report (03-22-25 @ 13:01):    No growth at 48 Hours        Culture - Sputum (collected 03-22-25 @ 17:15)  Source: ET Tube ET Tube  Gram Stain (03-22-25 @ 23:42):    Numerous polymorphonuclear leukocytes per low power field    Rare Squamous epithelial cells per low power field    No organisms seen per oil power field        RADIOLOGY & ADDITIONAL TESTS: Reviewed.

## 2025-03-23 NOTE — PROGRESS NOTE ADULT - ATTENDING COMMENTS
44 year old male with a history of SSD, UE DVT, gout, HTN, with L renal mass admitted with acute anemia/ sickle cell crisis and evaluation of his left renal mass s/p left ureteroscopy and biopsy (3/19/25) with 24 hour post operative course complicated by rapid responses for hypoglycemia c/b cardiac arrest x 4 minutes CPR/1 epi with ROSC, transferred to MICU for further care     Found to be profoundly anemic, acidemic, with multiorgan dysfunction overall concerning for acute hemorrhagic shock    N: Sedation  Seizure like activity post arrest with CTH mild loss of gray-white differentiation concerning for hypoxemic injury  - vEEG  - NSE pending  - Keppra for seizures  - vEEG: Clarify report with seizure  - Versed 0.2, Fent 4, Prop 50--> Discussed with Dr. Smith; per neurology-- would like to come off sedation to better understand vEEG. Weaned off Versed. On Fent 4 and Prop 50  - Eventual MRI-- will expedite getting completed today  - RASS  -3 to -4   - Appreciate neuro closely  CV: Shock hemorrhagic  PEA arrest in the setting of massive hemorrhage s/p 5 PRBC 3 plasma, 3 platelets  RIJ thrombus  L brachial DVT  - Continue with low dose levophed  - MAP >65  - Coags, trend CBC closely-->space to daily  - Formal TTE  - No A/C  P: Acute hypoxemic respiratory failure iso cardiac arrest  CT chest 3/15 with RUL nodule, mucus plugging, atalectasis  History of DVT, off A/C x 24 hours  - Lung protective ventilation  - Adjust based on BG  - SBT as able  R: MATA iso arrest, hypovolemic shock; suspect ATN   Renal mass s/p ureteroscopy and biopsy with urology  Hyperkalemia  Profound acidemia iso elevated lactate  - Will discuss renal replacement therapy and next steps with family; No indication for dialysis at this time  - Dave  - Urology following   - Trend Cr, uop, lytes  GI: Shock liver  Ileus  - Trend LFTs  - RUQ ultrasound  - Tube feeds  Heme: SSD  Pain crisis   Acute blood loss anemia s/p resus with 1:1:1 for hemorrhagic shock  VTE (off A/C) iso hemhorrahge   - Coags   - Trend hemolysis labs  - Trend CBC  - IVF  - Appreciate heme input  Endo: Hypoglycemia  - Trend BG, BG <215  - Avoid hypoglycemia  ID:   - Aztreonam and Flagyl added back with fevers and CXR findings cannot rule out aspiration pneumonia. Will plan for 5-7 day course  - Pan cultured, will follow       DVT: SCD  Full Code  Updated family at bedside.

## 2025-03-23 NOTE — PROGRESS NOTE ADULT - SUBJECTIVE AND OBJECTIVE BOX
Subjective:  intubated, sedated, on levo    Objective:    Vital signs  T(C): , Max: 38 (03-23-25 @ 00:00)  HR: 83 (03-23-25 @ 13:27)  BP: --  SpO2: 99% (03-23-25 @ 13:27)  Wt(kg): --    Output     03-22 @ 07:01  -  03-23 @ 07:00  --------------------------------------------------------  IN: 2444.4 mL / OUT: 175 mL / NET: 2269.4 mL    03-23 @ 07:01  -  03-23 @ 13:43  --------------------------------------------------------  IN: 371.4 mL / OUT: 125 mL / NET: 246.4 mL        Gen: NAD  Abd: soft, nontender, nondistended  : daniel secured in place, draining CYU, no visible ecchymosis surrounding L flank area    Labs                        8.2    14.83 )-----------( 162      ( 23 Mar 2025 01:05 )             22.9     23 Mar 2025 05:02    133    |  93     |  93     ----------------------------<  133    4.9     |  20     |  4.94     Ca    9.0        23 Mar 2025 05:02  Phos  5.0       23 Mar 2025 01:05  Mg     2.40      23 Mar 2025 01:05        Urine Cx: ?  Blood Cx: ?    Culture - Sputum (collected 03-22-25 @ 17:15)  Source: ET Tube ET Tube  Gram Stain (03-22-25 @ 23:42):    Numerous polymorphonuclear leukocytes per low power field    Rare Squamous epithelial cells per low power field    No organisms seen per oil power field  Preliminary Report (03-23-25 @ 12:36):    No growth to date        Imaging

## 2025-03-23 NOTE — PROGRESS NOTE ADULT - SUBJECTIVE AND OBJECTIVE BOX
Patient is a 44y old  Male who presents with a chief complaint of Referred by Hematologist for low Hg (22 Mar 2025 09:02)  Remains intubated, on pressors, febrile, EEG reviewed      MEDICATIONS  (STANDING):  allopurinol 50 milliGRAM(s) Oral <User Schedule>  aztreonam  IVPB 2000 milliGRAM(s) IV Intermittent <User Schedule>  chlorhexidine 0.12% Liquid 15 milliLiter(s) Oral Mucosa every 12 hours  chlorhexidine 2% Cloths 1 Application(s) Topical daily  deferasirox Tablet 720 milliGRAM(s) Oral daily  fentaNYL   Infusion. 0.5 MICROgram(s)/kG/Hr (4.44 mL/Hr) IV Continuous <Continuous>  folic acid 1 milliGRAM(s) Oral daily  influenza   Vaccine 0.5 milliLiter(s) IntraMuscular once  levETIRAcetam   Injectable 500 milliGRAM(s) IV Push <User Schedule>  metroNIDAZOLE  IVPB 500 milliGRAM(s) IV Intermittent every 8 hours  midazolam Infusion. 0.1 mG/kG/Hr (8.88 mL/Hr) IV Continuous <Continuous>  norepinephrine Infusion 1 MICROgram(s)/kG/Min (167 mL/Hr) IV Continuous <Continuous>  petrolatum Ophthalmic Ointment 1 Application(s) Both EYES two times a day  propofol Infusion 25 MICROgram(s)/kG/Min (13.3 mL/Hr) IV Continuous <Continuous>    MEDICATIONS  (PRN):          Vital Signs Last 24 Hrs  T(C): 37.9 (23 Mar 2025 04:00), Max: 38.3 (22 Mar 2025 08:00)  T(F): 100.2 (23 Mar 2025 04:00), Max: 100.9 (22 Mar 2025 08:00)  HR: 91 (23 Mar 2025 06:00) (91 - 109)  BP: --  BP(mean): --  RR: 17 (23 Mar 2025 06:00) (14 - 24)  SpO2: 97% (23 Mar 2025 06:00) (92% - 100%)    Parameters below as of 23 Mar 2025 06:00  Patient On (Oxygen Delivery Method): ventilator    O2 Concentration (%): 40    PE  Anicteric, MMM  RRR  CTAB  Abd soft, NT, ND  No c/c/e  No rash grossly                            8.2    14.83 )-----------( 162      ( 23 Mar 2025 01:05 )             22.9       03-23    133[L]  |  93[L]  |  93[H]  ----------------------------<  133[H]  4.9   |  20[L]  |  4.94[H]    Ca    9.0      23 Mar 2025 05:02  Phos  5.0     03-23  Mg     2.40     03-23    TPro  x   /  Alb  x   /  TBili  x   /  DBili  13.2[H]  /  AST  x   /  ALT  x   /  AlkPhos  x   03-22

## 2025-03-24 DIAGNOSIS — E87.5 HYPERKALEMIA: ICD-10-CM

## 2025-03-24 DIAGNOSIS — E87.70 FLUID OVERLOAD, UNSPECIFIED: ICD-10-CM

## 2025-03-24 DIAGNOSIS — N17.9 ACUTE KIDNEY FAILURE, UNSPECIFIED: ICD-10-CM

## 2025-03-24 LAB
ACANTHOCYTES BLD QL SMEAR: SLIGHT — SIGNIFICANT CHANGE UP
ALBUMIN SERPL ELPH-MCNC: 2.6 G/DL — LOW (ref 3.3–5)
ALP SERPL-CCNC: 113 U/L — SIGNIFICANT CHANGE UP (ref 40–120)
ALP SERPL-CCNC: 117 U/L — SIGNIFICANT CHANGE UP (ref 40–120)
ALP SERPL-CCNC: 118 U/L — SIGNIFICANT CHANGE UP (ref 40–120)
ALT FLD-CCNC: 136 U/L — HIGH (ref 4–41)
ALT FLD-CCNC: 158 U/L — HIGH (ref 4–41)
ALT FLD-CCNC: 170 U/L — HIGH (ref 4–41)
ANION GAP SERPL CALC-SCNC: 19 MMOL/L — HIGH (ref 7–14)
ANION GAP SERPL CALC-SCNC: 20 MMOL/L — HIGH (ref 7–14)
ANION GAP SERPL CALC-SCNC: 21 MMOL/L — HIGH (ref 7–14)
ANISOCYTOSIS BLD QL: SLIGHT — SIGNIFICANT CHANGE UP
APTT BLD: 30.1 SEC — SIGNIFICANT CHANGE UP (ref 24.5–35.6)
AST SERPL-CCNC: 208 U/L — HIGH (ref 4–40)
AST SERPL-CCNC: 247 U/L — HIGH (ref 4–40)
AST SERPL-CCNC: 272 U/L — HIGH (ref 4–40)
BASOPHILS # BLD AUTO: 0 K/UL — SIGNIFICANT CHANGE UP (ref 0–0.2)
BASOPHILS # BLD AUTO: 0.09 K/UL — SIGNIFICANT CHANGE UP (ref 0–0.2)
BASOPHILS NFR BLD AUTO: 0 % — SIGNIFICANT CHANGE UP (ref 0–2)
BASOPHILS NFR BLD AUTO: 0.5 % — SIGNIFICANT CHANGE UP (ref 0–2)
BILIRUB DIRECT SERPL-MCNC: 14.3 MG/DL — HIGH (ref 0–0.3)
BILIRUB INDIRECT FLD-MCNC: 1.1 MG/DL — HIGH (ref 0–1)
BILIRUB SERPL-MCNC: 15.4 MG/DL — HIGH (ref 0.2–1.2)
BILIRUB SERPL-MCNC: 15.4 MG/DL — HIGH (ref 0.2–1.2)
BILIRUB SERPL-MCNC: 15.6 MG/DL — HIGH (ref 0.2–1.2)
BILIRUB SERPL-MCNC: 15.8 MG/DL — HIGH (ref 0.2–1.2)
BLOOD GAS ARTERIAL COMPREHENSIVE RESULT: SIGNIFICANT CHANGE UP
BUN SERPL-MCNC: 100 MG/DL — HIGH (ref 7–23)
BUN SERPL-MCNC: 102 MG/DL — HIGH (ref 7–23)
BUN SERPL-MCNC: 97 MG/DL — HIGH (ref 7–23)
CA-I BLD-SCNC: 1.08 MMOL/L — LOW (ref 1.15–1.29)
CALCIUM SERPL-MCNC: 8.8 MG/DL — SIGNIFICANT CHANGE UP (ref 8.4–10.5)
CALCIUM SERPL-MCNC: 8.9 MG/DL — SIGNIFICANT CHANGE UP (ref 8.4–10.5)
CALCIUM SERPL-MCNC: 9 MG/DL — SIGNIFICANT CHANGE UP (ref 8.4–10.5)
CHLORIDE SERPL-SCNC: 93 MMOL/L — LOW (ref 98–107)
CHLORIDE SERPL-SCNC: 94 MMOL/L — LOW (ref 98–107)
CHLORIDE SERPL-SCNC: 94 MMOL/L — LOW (ref 98–107)
CO2 SERPL-SCNC: 19 MMOL/L — LOW (ref 22–31)
CO2 SERPL-SCNC: 19 MMOL/L — LOW (ref 22–31)
CO2 SERPL-SCNC: 20 MMOL/L — LOW (ref 22–31)
CREAT SERPL-MCNC: 5.64 MG/DL — HIGH (ref 0.5–1.3)
CREAT SERPL-MCNC: 5.93 MG/DL — HIGH (ref 0.5–1.3)
CREAT SERPL-MCNC: 6.41 MG/DL — HIGH (ref 0.5–1.3)
CULTURE RESULTS: SIGNIFICANT CHANGE UP
EGFR: 10 ML/MIN/1.73M2 — LOW
EGFR: 10 ML/MIN/1.73M2 — LOW
EGFR: 11 ML/MIN/1.73M2 — LOW
EGFR: 11 ML/MIN/1.73M2 — LOW
EGFR: 12 ML/MIN/1.73M2 — LOW
EGFR: 12 ML/MIN/1.73M2 — LOW
EOSINOPHIL # BLD AUTO: 0 K/UL — SIGNIFICANT CHANGE UP (ref 0–0.5)
EOSINOPHIL # BLD AUTO: 0.04 K/UL — SIGNIFICANT CHANGE UP (ref 0–0.5)
EOSINOPHIL NFR BLD AUTO: 0 % — SIGNIFICANT CHANGE UP (ref 0–6)
EOSINOPHIL NFR BLD AUTO: 0.2 % — SIGNIFICANT CHANGE UP (ref 0–6)
GIANT PLATELETS BLD QL SMEAR: PRESENT — SIGNIFICANT CHANGE UP
GLUCOSE BLDC GLUCOMTR-MCNC: 135 MG/DL — HIGH (ref 70–99)
GLUCOSE BLDC GLUCOMTR-MCNC: 148 MG/DL — HIGH (ref 70–99)
GLUCOSE BLDC GLUCOMTR-MCNC: 180 MG/DL — HIGH (ref 70–99)
GLUCOSE SERPL-MCNC: 105 MG/DL — HIGH (ref 70–99)
GLUCOSE SERPL-MCNC: 122 MG/DL — HIGH (ref 70–99)
GLUCOSE SERPL-MCNC: 143 MG/DL — HIGH (ref 70–99)
HAPTOGLOB SERPL-MCNC: <20 MG/DL — LOW (ref 34–200)
HCT VFR BLD CALC: 21.1 % — LOW (ref 39–50)
HCT VFR BLD CALC: 22.3 % — LOW (ref 39–50)
HGB BLD-MCNC: 7.8 G/DL — LOW (ref 13–17)
HGB BLD-MCNC: 8.2 G/DL — LOW (ref 13–17)
IANC: 13.99 K/UL — HIGH (ref 1.8–7.4)
IANC: 15 K/UL — HIGH (ref 1.8–7.4)
IMM GRANULOCYTES NFR BLD AUTO: 1 % — HIGH (ref 0–0.9)
INR BLD: 1.03 RATIO — SIGNIFICANT CHANGE UP (ref 0.85–1.16)
LDH SERPL L TO P-CCNC: 1417 U/L — HIGH (ref 135–225)
LYMPHOCYTES # BLD AUTO: 0.62 K/UL — LOW (ref 1–3.3)
LYMPHOCYTES # BLD AUTO: 1.49 K/UL — SIGNIFICANT CHANGE UP (ref 1–3.3)
LYMPHOCYTES # BLD AUTO: 3.6 % — LOW (ref 13–44)
LYMPHOCYTES # BLD AUTO: 7.7 % — LOW (ref 13–44)
MAGNESIUM SERPL-MCNC: 2.5 MG/DL — SIGNIFICANT CHANGE UP (ref 1.6–2.6)
MCHC RBC-ENTMCNC: 29.9 PG — SIGNIFICANT CHANGE UP (ref 27–34)
MCHC RBC-ENTMCNC: 30.1 PG — SIGNIFICANT CHANGE UP (ref 27–34)
MCHC RBC-ENTMCNC: 36.8 G/DL — HIGH (ref 32–36)
MCHC RBC-ENTMCNC: 37 G/DL — HIGH (ref 32–36)
MCV RBC AUTO: 81.4 FL — SIGNIFICANT CHANGE UP (ref 80–100)
MCV RBC AUTO: 81.5 FL — SIGNIFICANT CHANGE UP (ref 80–100)
MONOCYTES # BLD AUTO: 1.1 K/UL — HIGH (ref 0–0.9)
MONOCYTES # BLD AUTO: 2.48 K/UL — HIGH (ref 0–0.9)
MONOCYTES NFR BLD AUTO: 12.9 % — SIGNIFICANT CHANGE UP (ref 2–14)
MONOCYTES NFR BLD AUTO: 6.4 % — SIGNIFICANT CHANGE UP (ref 2–14)
NEUTROPHILS # BLD AUTO: 15 K/UL — HIGH (ref 1.8–7.4)
NEUTROPHILS # BLD AUTO: 15.49 K/UL — HIGH (ref 1.8–7.4)
NEUTROPHILS NFR BLD AUTO: 77.7 % — HIGH (ref 43–77)
NEUTROPHILS NFR BLD AUTO: 88.2 % — HIGH (ref 43–77)
NEUTS BAND # BLD: 1.8 % — SIGNIFICANT CHANGE UP (ref 0–6)
NEUTS BAND NFR BLD: 1.8 % — SIGNIFICANT CHANGE UP (ref 0–6)
NRBC # BLD AUTO: 0.13 K/UL — HIGH (ref 0–0)
NRBC # BLD: 4 /100 WBCS — HIGH (ref 0–0)
NRBC # FLD: 0.13 K/UL — HIGH (ref 0–0)
NRBC BLD AUTO-RTO: 0 /100 WBCS — SIGNIFICANT CHANGE UP (ref 0–0)
NRBC BLD-RTO: 4 /100 WBCS — HIGH (ref 0–0)
OVALOCYTES BLD QL SMEAR: SLIGHT — SIGNIFICANT CHANGE UP
PHOSPHATE SERPL-MCNC: 6.5 MG/DL — HIGH (ref 2.5–4.5)
PLAT MORPH BLD: NORMAL — SIGNIFICANT CHANGE UP
PLATELET # BLD AUTO: 164 K/UL — SIGNIFICANT CHANGE UP (ref 150–400)
PLATELET # BLD AUTO: 165 K/UL — SIGNIFICANT CHANGE UP (ref 150–400)
PLATELET COUNT - ESTIMATE: NORMAL — SIGNIFICANT CHANGE UP
POIKILOCYTOSIS BLD QL AUTO: SIGNIFICANT CHANGE UP
POLYCHROMASIA BLD QL SMEAR: SLIGHT — SIGNIFICANT CHANGE UP
POTASSIUM SERPL-MCNC: 5.5 MMOL/L — HIGH (ref 3.5–5.3)
POTASSIUM SERPL-MCNC: 5.9 MMOL/L — HIGH (ref 3.5–5.3)
POTASSIUM SERPL-MCNC: 5.9 MMOL/L — HIGH (ref 3.5–5.3)
POTASSIUM SERPL-SCNC: 5.5 MMOL/L — HIGH (ref 3.5–5.3)
POTASSIUM SERPL-SCNC: 5.9 MMOL/L — HIGH (ref 3.5–5.3)
POTASSIUM SERPL-SCNC: 5.9 MMOL/L — HIGH (ref 3.5–5.3)
PROT SERPL-MCNC: 5.5 G/DL — LOW (ref 6–8.3)
PROT SERPL-MCNC: 5.9 G/DL — LOW (ref 6–8.3)
PROT SERPL-MCNC: 6 G/DL — SIGNIFICANT CHANGE UP (ref 6–8.3)
PROTHROM AB SERPL-ACNC: 12.3 SEC — SIGNIFICANT CHANGE UP (ref 9.9–13.4)
RBC # BLD: 2.59 M/UL — LOW (ref 4.2–5.8)
RBC # BLD: 2.74 M/UL — LOW (ref 4.2–5.8)
RBC # BLD: 2.74 M/UL — LOW (ref 4.2–5.8)
RBC # FLD: 17.7 % — HIGH (ref 10.3–14.5)
RBC # FLD: 18.4 % — HIGH (ref 10.3–14.5)
RBC BLD AUTO: ABNORMAL
RETICS #: 95.4 K/UL — SIGNIFICANT CHANGE UP (ref 25–125)
RETICS/RBC NFR: 3.5 % — HIGH (ref 0.5–2.5)
SCHISTOCYTES BLD QL AUTO: SLIGHT — SIGNIFICANT CHANGE UP
SMUDGE CELLS # BLD: PRESENT — SIGNIFICANT CHANGE UP
SODIUM SERPL-SCNC: 132 MMOL/L — LOW (ref 135–145)
SODIUM SERPL-SCNC: 133 MMOL/L — LOW (ref 135–145)
SODIUM SERPL-SCNC: 134 MMOL/L — LOW (ref 135–145)
SPECIMEN SOURCE: SIGNIFICANT CHANGE UP
TARGETS BLD QL SMEAR: SLIGHT — SIGNIFICANT CHANGE UP
TRIGL SERPL-MCNC: 348 MG/DL — HIGH
URATE SERPL-MCNC: 10.8 MG/DL — HIGH (ref 3.4–8.8)
WBC # BLD: 17.21 K/UL — HIGH (ref 3.8–10.5)
WBC # BLD: 19.29 K/UL — HIGH (ref 3.8–10.5)
WBC # FLD AUTO: 17.21 K/UL — HIGH (ref 3.8–10.5)
WBC # FLD AUTO: 19.29 K/UL — HIGH (ref 3.8–10.5)

## 2025-03-24 PROCEDURE — 70450 CT HEAD/BRAIN W/O DYE: CPT | Mod: 26

## 2025-03-24 PROCEDURE — 99223 1ST HOSP IP/OBS HIGH 75: CPT

## 2025-03-24 PROCEDURE — 36000 PLACE NEEDLE IN VEIN: CPT

## 2025-03-24 PROCEDURE — 76937 US GUIDE VASCULAR ACCESS: CPT | Mod: 26,59

## 2025-03-24 PROCEDURE — 99291 CRITICAL CARE FIRST HOUR: CPT | Mod: GC,25

## 2025-03-24 PROCEDURE — 36800 INSERTION OF CANNULA: CPT | Mod: GC

## 2025-03-24 PROCEDURE — 71045 X-RAY EXAM CHEST 1 VIEW: CPT | Mod: 26

## 2025-03-24 PROCEDURE — 99233 SBSQ HOSP IP/OBS HIGH 50: CPT | Mod: GC

## 2025-03-24 RX ORDER — ACETAMINOPHEN 500 MG/5ML
1000 LIQUID (ML) ORAL ONCE
Refills: 0 | Status: COMPLETED | OUTPATIENT
Start: 2025-03-24 | End: 2025-03-24

## 2025-03-24 RX ORDER — CALCIUM GLUCONATE 20 MG/ML
1 INJECTION, SOLUTION INTRAVENOUS ONCE
Refills: 0 | Status: COMPLETED | OUTPATIENT
Start: 2025-03-24 | End: 2025-03-24

## 2025-03-24 RX ORDER — SODIUM ZIRCONIUM CYCLOSILICATE 5 G/5G
10 POWDER, FOR SUSPENSION ORAL ONCE
Refills: 0 | Status: COMPLETED | OUTPATIENT
Start: 2025-03-24 | End: 2025-03-24

## 2025-03-24 RX ORDER — LEVETIRACETAM 10 MG/ML
500 INJECTION, SOLUTION INTRAVENOUS
Refills: 0 | Status: DISCONTINUED | OUTPATIENT
Start: 2025-03-24 | End: 2025-03-25

## 2025-03-24 RX ORDER — DEXTROSE 50 % IN WATER 50 %
50 SYRINGE (ML) INTRAVENOUS ONCE
Refills: 0 | Status: COMPLETED | OUTPATIENT
Start: 2025-03-24 | End: 2025-03-24

## 2025-03-24 RX ORDER — LORAZEPAM 4 MG/ML
2 VIAL (ML) INJECTION ONCE
Refills: 0 | Status: DISCONTINUED | OUTPATIENT
Start: 2025-03-24 | End: 2025-03-24

## 2025-03-24 RX ORDER — SENNA 187 MG
2 TABLET ORAL AT BEDTIME
Refills: 0 | Status: DISCONTINUED | OUTPATIENT
Start: 2025-03-24 | End: 2025-03-24

## 2025-03-24 RX ORDER — CALCIUM GLUCONATE 20 MG/ML
2 INJECTION, SOLUTION INTRAVENOUS ONCE
Refills: 0 | Status: COMPLETED | OUTPATIENT
Start: 2025-03-24 | End: 2025-03-24

## 2025-03-24 RX ORDER — MIDODRINE HYDROCHLORIDE 5 MG/1
10 TABLET ORAL EVERY 8 HOURS
Refills: 0 | Status: DISCONTINUED | OUTPATIENT
Start: 2025-03-24 | End: 2025-03-25

## 2025-03-24 RX ADMIN — Medication 50 MILLILITER(S): at 01:49

## 2025-03-24 RX ADMIN — Medication 4.44 MICROGRAM(S)/KG/HR: at 19:42

## 2025-03-24 RX ADMIN — Medication 5 UNIT(S): at 01:49

## 2025-03-24 RX ADMIN — Medication 50 MILLILITER(S): at 10:54

## 2025-03-24 RX ADMIN — CALCIUM GLUCONATE 200 GRAM(S): 20 INJECTION, SOLUTION INTRAVENOUS at 10:54

## 2025-03-24 RX ADMIN — Medication 5 UNIT(S): at 11:37

## 2025-03-24 RX ADMIN — Medication 400 MILLIGRAM(S): at 12:34

## 2025-03-24 RX ADMIN — Medication 1000 MILLIGRAM(S): at 14:00

## 2025-03-24 RX ADMIN — Medication 4 MILLILITER(S): at 15:50

## 2025-03-24 RX ADMIN — Medication 2 MILLIGRAM(S): at 19:52

## 2025-03-24 RX ADMIN — Medication 1 APPLICATION(S): at 05:09

## 2025-03-24 RX ADMIN — CALCIUM GLUCONATE 100 GRAM(S): 20 INJECTION, SOLUTION INTRAVENOUS at 01:36

## 2025-03-24 RX ADMIN — Medication 1 APPLICATION(S): at 18:19

## 2025-03-24 RX ADMIN — Medication 4 MILLILITER(S): at 09:26

## 2025-03-24 RX ADMIN — Medication 4.44 MICROGRAM(S)/KG/HR: at 07:55

## 2025-03-24 RX ADMIN — MIDODRINE HYDROCHLORIDE 10 MILLIGRAM(S): 5 TABLET ORAL at 15:08

## 2025-03-24 RX ADMIN — NOREPINEPHRINE BITARTRATE 167 MICROGRAM(S)/KG/MIN: 8 SOLUTION at 07:55

## 2025-03-24 RX ADMIN — LEVETIRACETAM 500 MILLIGRAM(S): 10 INJECTION, SOLUTION INTRAVENOUS at 18:19

## 2025-03-24 RX ADMIN — Medication 1 APPLICATION(S): at 12:33

## 2025-03-24 RX ADMIN — SODIUM ZIRCONIUM CYCLOSILICATE 10 GRAM(S): 5 POWDER, FOR SUSPENSION ORAL at 10:54

## 2025-03-24 RX ADMIN — Medication 15 MILLILITER(S): at 05:09

## 2025-03-24 RX ADMIN — NOREPINEPHRINE BITARTRATE 167 MICROGRAM(S)/KG/MIN: 8 SOLUTION at 19:42

## 2025-03-24 RX ADMIN — IPRATROPIUM BROMIDE AND ALBUTEROL SULFATE 3 MILLILITER(S): .5; 2.5 SOLUTION RESPIRATORY (INHALATION) at 02:39

## 2025-03-24 RX ADMIN — MIDODRINE HYDROCHLORIDE 10 MILLIGRAM(S): 5 TABLET ORAL at 21:09

## 2025-03-24 RX ADMIN — FOLIC ACID 1 MILLIGRAM(S): 1 TABLET ORAL at 12:33

## 2025-03-24 RX ADMIN — PROPOFOL 13.3 MICROGRAM(S)/KG/MIN: 10 INJECTION, EMULSION INTRAVENOUS at 19:41

## 2025-03-24 RX ADMIN — Medication 15 MILLILITER(S): at 18:19

## 2025-03-24 RX ADMIN — LEVETIRACETAM 500 MILLIGRAM(S): 10 INJECTION, SOLUTION INTRAVENOUS at 05:09

## 2025-03-24 RX ADMIN — IPRATROPIUM BROMIDE AND ALBUTEROL SULFATE 3 MILLILITER(S): .5; 2.5 SOLUTION RESPIRATORY (INHALATION) at 09:26

## 2025-03-24 RX ADMIN — AZTREONAM 200 MILLIGRAM(S): 2 INJECTION, POWDER, LYOPHILIZED, FOR SOLUTION INTRAMUSCULAR; INTRAVENOUS at 14:21

## 2025-03-24 NOTE — PROGRESS NOTE ADULT - ASSESSMENT
This is a 44 year old male with sickle cell disease who presents with low hemoglobin.  s/p left renal bx with stent placement 3/19    1. Sickle cell anemia  -- Baseline hemoglobin outpatient ~6.0, sent in for hemoglobin 5.3   -- Labs consistent with hemolysis- bili 15, uric acid 10.8, LDH 1820 likely multifactorial in setting of sepsis and liver dysfunction, Hgb stable low suspicion for hyperhemolysis, please check G6PD  --trend Hemolysis labs daily  -- CXR neg. CTA chest w/o PE though RUL opacity. Patient is asymptomatic and saturating well on room air.   -- Continue with folic acid, encourage hydration, optimize pain control on IV PCA   -- Monitor CBC and transfuse to maintain hg >6. s/p 4 units 3/20, stable Hgb since    2. R IJ DVT   -- Dx 12/2024,  holding AC  -- US negative,  RUE may be used for access       3. Back pain   -- Typical of his sickle cell pain   -- Outpatient he is on oxycodone 30mg PO q4h    4. Iron overload   -- Cont Jadenu    5. r/o malignancy   -- Prior imaging with concern for urothelial malignancy   -- s/p  L ureteroscopy, RGP, biopsy, stent placement L kidney wash, L kidney lower pole mass biopsy 3/19, will follow pathology    6. RRT  --s/p rapid response for hypoglycemia this AM, and then  at approximately 9:30AM a code blue was called when pt was found unresponsive and chest compressions started. Glucose at this time noted to be 41. ROSC was achieved approximately 4 minutes of compressions, w/ rhythm noted to be PEA. Pt was then intubated at bedside and sinus rhythm was seen on telemetry  -Hgb drop from 8.0--->3.4, s/p massive transfusion 3/19  -ct a/p 3/20 shows moderate size left perinephric hematoma tracking into the pelvis. Likely  associated small amount of left subcapsular hemorrhage  -s/p right common femoral artery access, Left renal angiogram with irregularity of left lower pole arterial branch, Coil embolization performed with decreased flow. IR recs appreciated  -Intubated and sedated on pressors with myoclonic activity and signs of anoxic brain injury on CTH 3/20/25  -EEG shows abundant myoclonic seizures in the setting of a severe diffuse/multifocal cerebral dysfunction which can be seen in the setting of sedative effect or due to an anoxic injury, with improvement after 20:50 suggesting a lowering of sedation, neuro recs noted and appreciated     7. Fevers  --tmax 101.7  --blood cultures from 3/20 NGTD  --in setting of hemorrhagic shock  --may benefit from ID consult    8. Renal failure  --plan for HD vs CRRT  --Oliguric  --renal recs appreciated        Will continue to follow.    Meghana Real NP  Hematology/Oncology  [facility]  [phone] (Office)  [phone] (Alt office)  Evenings and weekends please call MD on call or office

## 2025-03-24 NOTE — PROGRESS NOTE ADULT - ASSESSMENT
44year old man PMHX sickle cell prior acute chest syndrome, iron overload, Gout, HTN, RUE DVT (was on AC), renal lesion  -admitted 3/15/25 sickle cell crisis, received pRBCs and  s/p L ureteroscopy w/ biopsy and stent placement on 3/19.  -RRT 3/20 9am for ams/hypoglycemia, code blue 3/20 ~930am for PEA arrest attributed to hypoglycemia. Time to rosc from start of compressions 4 minutes but unknown total down time   -3/20 ~1400 Developed myoclonic movements of head, abdomen, LLE starting 3/20 ~1400, increased in frequency overnight into 3/21. Also in MICU hemorrhage shock due to RP bleed s/p embo with IR        Impression:  -PEA arrest in the setting of hypoglycemia leading to anoxic brain injury and post anoxic myoclonic status epilepticus involving jaw and occasionally impeding vent flow  -Prognosis for meaningful neurological recovery is guarded. Time to ROSC from start of CPR is only 4 minutes but total downtime unknown (<30mins) is overall indeterminant. Early anoxic injury on CT and early post arrest myoclonus are unfavorable. Exam on sedation confounds neuroprognostication.     Recommendations:  Investigations:  -Video EEG   -MRI brain w/o contrast to evaluate for anoxic brain injury however if unable to do MRI would obtain a repeat CTH  -Check Neuron Specific Enolase (NSE) on 24,48, 72 hours   -Please obtain first NSE 24-48 hrs post-cardiac arrest (Between 930am 3/21 and 930am 3/22)  -Please obtain second NSE 24 hours after drawing first NSE     Management:  -GOC per primary team  -continue keppra 500 mg TID (dose reduced due to decreased kidney function)  -C/W Sedation as per primary team, recommend weaning  -If no improvement with increasing midazolam, -Can load levetiracetam 2500mg (~30mg/kg) and continue 500mg Q8 (based on current CrCl of 48.8ml/min) - if kidney functions dramatically worsens will need dose decrease. (VPA another option but avoiding for now given liver issues)    Note finalized upon attestation by the Attending.

## 2025-03-24 NOTE — CHART NOTE - NSCHARTNOTEFT_GEN_A_CORE
Patient evaluated bedside. Remains intubated.   Vascular surgery consulted previously for LUE swelling with blisters.  Bilateral upper extremities remain swollen, including digits. L arm blisters popped. No skin changes noted on left arm/digits. Signals unchanged. R arm also swollen up to the digits but no blisters noted. Notified nurse regarding removing wrist band at this time given arm swelling. All compartments remain soft.     - Please continue to keep b/l UE elevated, Recommend gentle ACE wrap if arms do not improve with elevation and CRRT  - Please consult Hand Surgery in case hand swelling do not improve    No acute surgical intervention needed at this time. Please reconsult if there is any acute changes.       Rosy Frye MD  Vascular fellow

## 2025-03-24 NOTE — PROGRESS NOTE ADULT - SUBJECTIVE AND OBJECTIVE BOX
MICU PROGRESS NOTE     HISTORY OF PRESENT ILLNESS     NAEON. Afebrile. HR 90s-100s. MAP 6-70s. MV V A/C.       ROS: All negative except as listed above.    VITAL SIGNS:  ICU Vital Signs Last 24 Hrs  T(C): 37.9 (24 Mar 2025 08:00), Max: 37.9 (24 Mar 2025 08:00)  T(F): 100.2 (24 Mar 2025 08:00), Max: 100.2 (24 Mar 2025 08:00)  HR: 102 (24 Mar 2025 08:00) (75 - 108)  BP: --  BP(mean): --  ABP: 105/55 (24 Mar 2025 08:00) (94/52 - 133/64)  ABP(mean): 71 (24 Mar 2025 08:00) (66 - 87)  RR: 22 (24 Mar 2025 08:00) (17 - 22)  SpO2: 96% (24 Mar 2025 08:00) (87% - 100%)    O2 Parameters below as of 24 Mar 2025 08:00  Patient On (Oxygen Delivery Method): ventilator    O2 Concentration (%): 80      Mode: AC/ CMV (Assist Control/ Continuous Mandatory Ventilation), RR (machine): 17, TV (machine): 550, FiO2: 40, PEEP: 6, ITime: 0.88, MAP: 14, PIP: 34  Plateau pressure:   P/F ratio:     03-23 @ 07:01  -  03-24 @ 07:00  --------------------------------------------------------  IN: 2245.9 mL / OUT: 425 mL / NET: 1820.9 mL      CAPILLARY BLOOD GLUCOSE      POCT Blood Glucose.: 148 mg/dL (24 Mar 2025 02:08)      ECG: reviewed.    PHYSICAL EXAM:    General:   Neuro:   HEENT:  Chest:   Heart:   Lungs:   Abd:   Ext:   Back:   Pulses:   Lines/tubes/drains:   Psych:     MEDICATIONS:  MEDICATIONS  (STANDING):  albuterol/ipratropium for Nebulization 3 milliLiter(s) Nebulizer every 6 hours  allopurinol 50 milliGRAM(s) Oral <User Schedule>  aztreonam  IVPB 2000 milliGRAM(s) IV Intermittent <User Schedule>  chlorhexidine 0.12% Liquid 15 milliLiter(s) Oral Mucosa every 12 hours  chlorhexidine 2% Cloths 1 Application(s) Topical daily  deferasirox Tablet 720 milliGRAM(s) Oral daily  fentaNYL   Infusion. 0.5 MICROgram(s)/kG/Hr (4.44 mL/Hr) IV Continuous <Continuous>  folic acid 1 milliGRAM(s) Oral daily  influenza   Vaccine 0.5 milliLiter(s) IntraMuscular once  levETIRAcetam   Injectable 500 milliGRAM(s) IV Push <User Schedule>  norepinephrine Infusion 1 MICROgram(s)/kG/Min (167 mL/Hr) IV Continuous <Continuous>  petrolatum Ophthalmic Ointment 1 Application(s) Both EYES two times a day  propofol Infusion 25 MICROgram(s)/kG/Min (13.3 mL/Hr) IV Continuous <Continuous>  sodium chloride 3%  Inhalation 4 milliLiter(s) Inhalation every 12 hours    MEDICATIONS  (PRN):      ALLERGIES:  Allergies    penicillin (Pruritus)  hydroxyurea (Other)  piperacillin-tazobactam (Urticaria)  ceftriaxone (Anaphylaxis)    Intolerances        LABS:                        8.2    17.21 )-----------( 165      ( 24 Mar 2025 00:32 )             22.3     03-24    133[L]  |  94[L]  |  97[H]  ----------------------------<  122[H]  5.5[H]   |  19[L]  |  5.64[H]    Ca    8.9      24 Mar 2025 00:32  Phos  6.5     03-24  Mg     2.50     03-24    TPro  5.5[L]  /  Alb  2.6[L]  /  TBili  15.4[H]  /  DBili  14.3[H]  /  AST  272[H]  /  ALT  170[H]  /  AlkPhos  113  03-24    PT/INR - ( 24 Mar 2025 00:32 )   PT: 12.3 sec;   INR: 1.03 ratio         PTT - ( 24 Mar 2025 00:32 )  PTT:30.1 sec  Urinalysis Basic - ( 24 Mar 2025 00:32 )    Color: x / Appearance: x / SG: x / pH: x  Gluc: 122 mg/dL / Ketone: x  / Bili: x / Urobili: x   Blood: x / Protein: x / Nitrite: x   Leuk Esterase: x / RBC: x / WBC x   Sq Epi: x / Non Sq Epi: x / Bacteria: x      ABG:  pH, Arterial: 7.35 (03-24-25 @ 00:32)  pCO2, Arterial: 41 mmHg (03-24-25 @ 00:32)  pO2, Arterial: 69 mmHg (03-24-25 @ 00:32)      vBG:    Micro:    Culture - Blood (collected 03-20-25 @ 10:04)  Source: Blood Blood-Peripheral  Preliminary Report (03-23-25 @ 13:01):    No growth at 72 Hours        Culture - Sputum (collected 03-22-25 @ 17:15)  Source: ET Tube ET Tube  Gram Stain (03-22-25 @ 23:42):    Numerous polymorphonuclear leukocytes per low power field    Rare Squamous epithelial cells per low power field    No organisms seen per oil power field  Final Report (03-24-25 @ 06:52):    Commensal lay consistent with body site        RADIOLOGY & ADDITIONAL TESTS: Reviewed. MICU PROGRESS NOTE     HISTORY OF PRESENT ILLNESS     NAEON. Afebrile. HR 90s-100s. MAP 60-70s. MV V A/C RR 17,  mL PEEP 6 FiO2 50 to 80%.     Seen and examined at bedside, patient desaturating to 87% that increased to 95% after suctioning. Limited meaningful interaction on exam.       ROS: All negative except as listed above.    VITAL SIGNS:  ICU Vital Signs Last 24 Hrs  T(C): 37.9 (24 Mar 2025 08:00), Max: 37.9 (24 Mar 2025 08:00)  T(F): 100.2 (24 Mar 2025 08:00), Max: 100.2 (24 Mar 2025 08:00)  HR: 102 (24 Mar 2025 08:00) (75 - 108)  BP: --  BP(mean): --  ABP: 105/55 (24 Mar 2025 08:00) (94/52 - 133/64)  ABP(mean): 71 (24 Mar 2025 08:00) (66 - 87)  RR: 22 (24 Mar 2025 08:00) (17 - 22)  SpO2: 96% (24 Mar 2025 08:00) (87% - 100%)    O2 Parameters below as of 24 Mar 2025 08:00  Patient On (Oxygen Delivery Method): ventilator    O2 Concentration (%): 80      Mode: AC/ CMV (Assist Control/ Continuous Mandatory Ventilation), RR (machine): 17, TV (machine): 550, FiO2: 40, PEEP: 6, ITime: 0.88, MAP: 14, PIP: 34  Plateau pressure:   P/F ratio:     03-23 @ 07:01  -  03-24 @ 07:00  --------------------------------------------------------  IN: 2245.9 mL / OUT: 425 mL / NET: 1820.9 mL      CAPILLARY BLOOD GLUCOSE      POCT Blood Glucose.: 148 mg/dL (24 Mar 2025 02:08)      ECG: reviewed.    PHYSICAL EXAM:    General: NAD, normal habitus, mild jaundice   Neuro: AAOx3, on sedation, non responsive to noxious stimuli, closed eyes, no verbal sounds, sluggishly reactive pupils b/l   HEENT: icteric sclerae b/l   Chest: nonTTP   Heart: S1/S2, RRR   Lungs: CTA b/l, on MV   Abd: soft, nonTTP, nondistended   Ext: generalized nonpitting edema, LUE with bullae and desquamation, nodule on R 1st digit proximal phalanges   Pulses: radial 2+ b/l, dorsalis pedis 2+ b/l   Lines/tubes/drains: triple lumen in L femoral, OG tube   Psych: unable to assess     MEDICATIONS:  MEDICATIONS  (STANDING):  albuterol/ipratropium for Nebulization 3 milliLiter(s) Nebulizer every 6 hours  allopurinol 50 milliGRAM(s) Oral <User Schedule>  aztreonam  IVPB 2000 milliGRAM(s) IV Intermittent <User Schedule>  chlorhexidine 0.12% Liquid 15 milliLiter(s) Oral Mucosa every 12 hours  chlorhexidine 2% Cloths 1 Application(s) Topical daily  deferasirox Tablet 720 milliGRAM(s) Oral daily  fentaNYL   Infusion. 0.5 MICROgram(s)/kG/Hr (4.44 mL/Hr) IV Continuous <Continuous>  folic acid 1 milliGRAM(s) Oral daily  influenza   Vaccine 0.5 milliLiter(s) IntraMuscular once  levETIRAcetam   Injectable 500 milliGRAM(s) IV Push <User Schedule>  norepinephrine Infusion 1 MICROgram(s)/kG/Min (167 mL/Hr) IV Continuous <Continuous>  petrolatum Ophthalmic Ointment 1 Application(s) Both EYES two times a day  propofol Infusion 25 MICROgram(s)/kG/Min (13.3 mL/Hr) IV Continuous <Continuous>  sodium chloride 3%  Inhalation 4 milliLiter(s) Inhalation every 12 hours    MEDICATIONS  (PRN):      ALLERGIES:  Allergies    penicillin (Pruritus)  hydroxyurea (Other)  piperacillin-tazobactam (Urticaria)  ceftriaxone (Anaphylaxis)    Intolerances        LABS:                        8.2    17.21 )-----------( 165      ( 24 Mar 2025 00:32 )             22.3     03-24    133[L]  |  94[L]  |  97[H]  ----------------------------<  122[H]  5.5[H]   |  19[L]  |  5.64[H]    Ca    8.9      24 Mar 2025 00:32  Phos  6.5     03-24  Mg     2.50     03-24    TPro  5.5[L]  /  Alb  2.6[L]  /  TBili  15.4[H]  /  DBili  14.3[H]  /  AST  272[H]  /  ALT  170[H]  /  AlkPhos  113  03-24    PT/INR - ( 24 Mar 2025 00:32 )   PT: 12.3 sec;   INR: 1.03 ratio         PTT - ( 24 Mar 2025 00:32 )  PTT:30.1 sec  Urinalysis Basic - ( 24 Mar 2025 00:32 )    Color: x / Appearance: x / SG: x / pH: x  Gluc: 122 mg/dL / Ketone: x  / Bili: x / Urobili: x   Blood: x / Protein: x / Nitrite: x   Leuk Esterase: x / RBC: x / WBC x   Sq Epi: x / Non Sq Epi: x / Bacteria: x      ABG:  pH, Arterial: 7.35 (03-24-25 @ 00:32)  pCO2, Arterial: 41 mmHg (03-24-25 @ 00:32)  pO2, Arterial: 69 mmHg (03-24-25 @ 00:32)      vBG:    Micro:    Culture - Blood (collected 03-20-25 @ 10:04)  Source: Blood Blood-Peripheral  Preliminary Report (03-23-25 @ 13:01):    No growth at 72 Hours        Culture - Sputum (collected 03-22-25 @ 17:15)  Source: ET Tube ET Tube  Gram Stain (03-22-25 @ 23:42):    Numerous polymorphonuclear leukocytes per low power field    Rare Squamous epithelial cells per low power field    No organisms seen per oil power field  Final Report (03-24-25 @ 06:52):    Commensal lay consistent with body site        RADIOLOGY & ADDITIONAL TESTS: Reviewed.

## 2025-03-24 NOTE — CONSULT NOTE ADULT - SUBJECTIVE AND OBJECTIVE BOX
[facility] DIVISION OF KIDNEY DISEASES AND HYPERTENSION -- [phone]  -- INITIAL CONSULT NOTE  --------------------------------------------------------------------------------  HPI: 43yo M w/ PMH of cirrhosis, hemochromatosis, sickle cell disease and gout admitted for anemia concerning for sickle cell crisis s/p ureteroscopy w/ L kidney biopsy 3/19, post-operative course c/b acute hemorrhagic shock, PEA arrest requiring transfer to MICU. Nephrology consulted for oliguric MATA and hyperkalemia.     Pt seen and examined this morning in MICU. Pt intubated/sedated and on IV vasopressor. Unable to obtain history/ROS.     PAST HISTORY  --------------------------------------------------------------------------------  PAST MEDICAL & SURGICAL HISTORY:  Sickle cell anemia  Gout  Deep vein thrombosis (DVT)  Iron overload  Hypertension  History of cholecystectomy  History of removal of Port-a-Cath    FAMILY HISTORY:  Family history of sickle cell trait (Father, Mother)    Patient's father is  (Father)    Patient's mother is  (Mother)    PAST SOCIAL HISTORY: n/a    ALLERGIES & MEDICATIONS  --------------------------------------------------------------------------------  Allergies    penicillin (Pruritus)  hydroxyurea (Other)  piperacillin-tazobactam (Urticaria)  ceftriaxone (Anaphylaxis)    Intolerances    Standing Inpatient Medications  albuterol/ipratropium for Nebulization 3 milliLiter(s) Nebulizer every 6 hours  allopurinol 50 milliGRAM(s) Oral <User Schedule>  aztreonam  IVPB 2000 milliGRAM(s) IV Intermittent <User Schedule>  chlorhexidine 0.12% Liquid 15 milliLiter(s) Oral Mucosa every 12 hours  chlorhexidine 2% Cloths 1 Application(s) Topical daily  deferasirox Tablet 720 milliGRAM(s) Oral daily  fentaNYL   Infusion. 0.5 MICROgram(s)/kG/Hr IV Continuous <Continuous>  folic acid 1 milliGRAM(s) Oral daily  influenza   Vaccine 0.5 milliLiter(s) IntraMuscular once  levETIRAcetam   Injectable 500 milliGRAM(s) IV Push <User Schedule>  norepinephrine Infusion 1 MICROgram(s)/kG/Min IV Continuous <Continuous>  petrolatum Ophthalmic Ointment 1 Application(s) Both EYES two times a day  propofol Infusion 25 MICROgram(s)/kG/Min IV Continuous <Continuous>  sodium chloride 3%  Inhalation 4 milliLiter(s) Inhalation every 12 hours    PRN Inpatient Medications    REVIEW OF SYSTEMS  --------------------------------------------------------------------------------  Unable to obtain    VITALS/PHYSICAL EXAM  --------------------------------------------------------------------------------  T(C): 37.9 (25 @ 08:00), Max: 37.9 (25 @ 08:00)  HR: 102 (25 @ 08:00) (75 - 108)  BP: --  RR: 22 (25 @ 08:00) (17 - 22)  SpO2: 96% (25 @ 08:00) (87% - 100%)  Wt(kg): --    25 @ 07:01  -  25 @ 07:00  --------------------------------------------------------  IN: 2245.9 mL / OUT: 425 mL / NET: 1820.9 mL    Physical Exam:  	Gen: Sedated  	HEENT: +ETT  	Pulm: Mechanical breath sounds, FIO2 80%  	CV: S1S2  	Abd: Soft, +BS               : +daniel catheter w/o urine in bag   	Ext: +LE edema B/L  	Neuro: Sedate  	Skin: Warm and dry  	Vascular access: none for dialysis     LABS/STUDIES  --------------------------------------------------------------------------------              8.2    17.21 >-----------<  165      [25 00:32]              22.3     134  |  94  |  100  ----------------------------<  143      [25 08:00]  5.9   |  19  |  5.93        Ca     8.8     [25 08:00]      iCa    1.08     [:32]      Mg     2.50     [25:32]      Phos  6.5     [25:32]    TPro  6.0  /  Alb  2.6  /  TBili  15.8  /  DBili  x   /  AST  247  /  ALT  158  /  AlkPhos  118  [25 08:00]    PT/INR: PT 12.3 , INR 1.03       [25:32]  PTT: 30.1       [25 00:32]    Uric acid 10.8      [25 00:32]  LDH 1417      [25 00:32]    Creatinine Trend:  SCr 5.93 [03-24 @ 08:00]  SCr 5.64 [ 00:32]  SCr 5.32 [ @ 13:10]  SCr 4.94 [ 05:02]  SCr 4.84 [ @ 01:05]    Iron 428, TIBC 541, %sat 79      [10-24-24 @ 23:48]  Ferritin 3189      [10-28-24 @ 06:55]    HBsAb 18.1      [24 @ 04:30]  HBsAg Nonreact      [25 @ 15:26]  HBcAb Nonreact      [24 @ 04:30]  HCV 0.10, Nonreact      [25 @ 15:26]  HIV Nonreact      [25 15:26]  HIV Nonreact      [10-13-23 @ 01:00]    OSMANY: titer 1:160, pattern Homogeneous      [24 @ 04:30]

## 2025-03-24 NOTE — CHART NOTE - NSCHARTNOTEFT_GEN_A_CORE
:  Sha Stoll    INDICATION: central line insertion    PROCEDURE:   [ ] LIMITED ECHO  [ ] LIMITED CHEST  [ ] LIMITED RETROPERITONEAL  [ ] LIMITED ABDOMINAL  [ ] LIMITED DVT  [ ] LIMITED NECK - TRACHEOSTOMY EVAL  [x] NEEDLE GUIDANCE VASCULAR  [ ] NEEDLE GUIDANCE THORACENTESIS  [ ] NEEDLE GUIDANCE PARACENTESIS  [ ] NEEDLE GUIDANCE PERICARDIOCENTESIS  [ ] OTHER    FINDINGS:   R sided internal jugular small even in Trendelenburg with large external jugular. Multiple enlarged lymph nodes around the R internal jugular vein and carotid artery.  L internal jugular large and laterally located relative to carotid. Compressible down to clavicle.     INTERPRETATION:   Successful placement of LIJ 14F 2 lumen dialysis catheter with seldinger technique. Sutured in place and covered with sterile dressing.     Supervised by Dr. Yuan  Images stored in qpath  Should not be considered final until signed by attending. :  Sha Stoll    INDICATION: central line insertion    PROCEDURE:   [ ] LIMITED ECHO  [ ] LIMITED CHEST  [ ] LIMITED RETROPERITONEAL  [ ] LIMITED ABDOMINAL  [ ] LIMITED DVT  [ ] LIMITED NECK - TRACHEOSTOMY EVAL  [x] NEEDLE GUIDANCE VASCULAR  [ ] NEEDLE GUIDANCE THORACENTESIS  [ ] NEEDLE GUIDANCE PARACENTESIS  [ ] NEEDLE GUIDANCE PERICARDIOCENTESIS  [ ] OTHER    FINDINGS:   R sided internal jugular small even in Trendelenburg with large external jugular. Multiple enlarged lymph nodes around the R internal jugular vein and carotid artery.  L internal jugular large and laterally located relative to carotid. Compressible down to clavicle.     INTERPRETATION:   Successful placement of LIJ 14F 2 lumen dialysis catheter with seldinger technique. Sutured in place and covered with sterile dressing.     Supervised by Dr. Yuan  Images stored in qpath  Should not be considered final until signed by attending.    Attending note :  I was present for the duration of the procedure and supervised as needed.

## 2025-03-24 NOTE — CONSULT NOTE ADULT - PROBLEM SELECTOR RECOMMENDATION 9
Pt w/ oliguric MATA ios hemorrhagic shock and PEA arrest. [facility] Baseline Scr wnl. On admission Scr wnl 1.25 (3/15), nate 0.78 (3/18). Scr increased to 2.20 (2/20) and further to 5.93 today (3/24). 24hr UOP ~425cc, and no UOP since 1am today. UA (3/16) w/ proteinuria and hematuria (21 RBC). Renal US (3/20) w/o hydronephrosis, and w/ large left renal subcapsular hematoma. CXR (3/22) w/ b/l hazy opacities. Pt hemodynamically unstable, volume overloaded w/ worsening non oliguric renal failure not responding to diuretic challenge (3/22 and 3/23). Plan to initiate CRRT after NTDC placement today, orders placed. Dialysis consent obtained and placed in chart. rpt UA, check UPCR. Monitor labs, VS and UOP. Avoid nephrotoxins when possible. Dose medications as per CrCl 40-45ml/min. Pt w/ oliguric MATA ios hemorrhagic shock and PEA arrest. [facility]. Baseline Scr wnl. On admission Scr wnl 1.25 (3/15), nate 0.78 (3/18). Scr increased to 2.20 (2/20) and further to 5.93 today (3/24). 24hr UOP ~425cc, and no UOP since 1am today. UA (3/16) w/ proteinuria and hematuria (21 RBC). Renal US (3/20) w/o hydronephrosis, and w/ large left renal subcapsular hematoma. CXR (3/22) w/ b/l hazy opacities. Pt hemodynamically unstable, volume overloaded w/ worsening non oliguric renal failure not responding to diuretic challenge (3/22 and 3/23). Plan to initiate dialysis after NTDC placement today, orders placed. Dialysis consent obtained and placed in chart. rpt UA, check UPCR. Monitor labs, VS and UOP. Avoid nephrotoxins when possible. Dose medications as per gfr

## 2025-03-24 NOTE — PROGRESS NOTE ADULT - ASSESSMENT
44y M s/p  L ureteroscopy, RGP, biopsy, stent placement L kidney wash, L kidney lower pole mass biopsy on 3/19, RRT and code blue 3/20 now in MICU intubated and sedated on pressors with myoclonic activity and signs of anoxic brain injury on CTH 3/20/25.    3/19: s/p L ureteroscopy, RGP, biopsy, stent placement L kidney wash, L kidney lower pole mass biopsy  3/20: RRT 9a and Code Blue 930a, ROSC achieved in 4 minutes, transferred to MICU intubated and sedated on pressors s/p several pRBC, PLT, and plasma transfusions for Hgb 3.4  3/21: H/H 8.4/22.6, Cr 2.38, 2.85  3/22: off pressors, H/H 8.4/22.9, Cr 3.35, 3.68, lactate 1.5  3/23: H/H 8.2/22.9, back on levo 0.07, Cr 4.9, improved UOP clear yellow    Recommendations:  - Continue daniel catheter, monitor I/O's   - Trend H/H (stable), Cr (uptrending), WBC (downtrending), lactate (cleared 3/22)  - Appreciate neuro recommendations, f/u MRI  - Appreciate heme/onc recommendations  - F/u nephrology consult for continued MATA and consideration for HD  - Appreciate excellent MICU care    Seen and d/w Dr. Rosey Carolina Putney for Urology  [address] [place] [phone]

## 2025-03-24 NOTE — PROGRESS NOTE ADULT - SUBJECTIVE AND OBJECTIVE BOX
Subjective  Patient remains intubated and sedated.     Objective    Vital signs  T(F): , Max: 99.5 (03-24-25 @ 00:00)  HR: 106 (03-24-25 @ 06:00)  BP: --  SpO2: 92% (03-24-25 @ 06:00)  Wt(kg): --    Output     OUT:    Indwelling Catheter - Urethral (mL): 425 mL  Total OUT: 425 mL    Total NET: -425 mL          Gen: NAD  Abd: soft, nontender, nondistended  : Dave draining light urine.     Labs      03-24 @ 00:32    WBC 17.21 / Hct 22.3  / SCr 5.64     03-23 @ 13:10    WBC 18.08 / Hct 23.0  / SCr 5.32         Culture - Sputum (collected 03-22-25 @ 17:15)  Source: ET Tube ET Tube  Gram Stain (03-22-25 @ 23:42):    Numerous polymorphonuclear leukocytes per low power field    Rare Squamous epithelial cells per low power field    No organisms seen per oil power field  Final Report (03-24-25 @ 06:52):    Commensal lay consistent with body site    Culture - Blood (collected 03-20-25 @ 10:04)  Source: Blood Blood-Peripheral  Preliminary Report (03-23-25 @ 13:01):    No growth at 72 Hours        Urine Cx: ?  Blood Cx: ?    Imaging

## 2025-03-24 NOTE — PROGRESS NOTE ADULT - SUBJECTIVE AND OBJECTIVE BOX
Neurology Progress Note    SUBJECTIVE/OBJECTIVE/INTERVAL EVENTS: Patient seen and examined at bedside w/ neuro attending and team.     INTERVAL HISTORY: No acute events overnight.     REVIEW OF SYSTEMS: Unable to obtain as pt is intubated and sedated. Continues on propofol for sedation.     VITALS & EXAMINATION:  Vital Signs Last 24 Hrs  T(C): 37.2 (23 Mar 2025 08:00), Max: 38.1 (22 Mar 2025 12:00)  T(F): 99 (23 Mar 2025 08:00), Max: 100.6 (22 Mar 2025 12:00)  HR: 83 (23 Mar 2025 09:00) (83 - 106)  BP: --  BP(mean): --  RR: 20 (23 Mar 2025 09:00) (14 - 24)  SpO2: 98% (23 Mar 2025 09:00) (92% - 100%)    Parameters below as of 23 Mar 2025 08:00  Patient On (Oxygen Delivery Method): ventilator    O2 Concentration (%): 40    Altered mental status - On sedation with Propofol 50mcg/kg/min, -Not Following commands   Does not track, does not attend to examiner  - on propofol,  on MV  Cranial reflexes- Pupils B/L equal 2mm sluggishly reacting to light, corneal reflex + bilaterally, gag reflex +  Motor and sensory- no response to pain  -Tone - decreased throughout   - DTRs not tested        LABORATORY:  CBC                       8.2    14.83 )-----------( 162      ( 23 Mar 2025 01:05 )             22.9     Chem 03-23    133[L]  |  93[L]  |  93[H]  ----------------------------<  133[H]  4.9   |  20[L]  |  4.94[H]    Ca    9.0      23 Mar 2025 05:02  Phos  5.0     03-23  Mg     2.40     03-23    TPro  x   /  Alb  x   /  TBili  x   /  DBili  13.2[H]  /  AST  x   /  ALT  x   /  AlkPhos  x   03-22    LFTs LIVER FUNCTIONS - ( 22 Mar 2025 00:11 )  Alb: 2.7 g/dL / Pro: 5.0 g/dL / ALK PHOS: 96 U/L / ALT: 288 U/L / AST: 553 U/L / GGT: x           Coagulopathy PT/INR - ( 23 Mar 2025 01:05 )   PT: 13.0 sec;   INR: 1.12 ratio         PTT - ( 23 Mar 2025 01:05 )  PTT:29.9 sec  Lipid Panel   A1c   Cardiac enzymes     U/A Urinalysis Basic - ( 23 Mar 2025 05:02 )    Color: x / Appearance: x / SG: x / pH: x  Gluc: 133 mg/dL / Ketone: x  / Bili: x / Urobili: x   Blood: x / Protein: x / Nitrite: x   Leuk Esterase: x / RBC: x / WBC x   Sq Epi: x / Non Sq Epi: x / Bacteria: x      CSF  Immunological  Other    STUDIES & IMAGING: (EEG, CT, MR, U/S, TTE/PA):      EEG  Study Date: 03-22-25 08:00 - 08:00 03-23-25  Duration x Hours: 24 hrs  Clinical Impression:    1. GPDs can be seen in the setting of toxic/metabolic etiologies and can be associated with seizures at frequencies > 2.5 Hz  2. Risk of focal onset seizures in the left posterior temporal region  3. Severe diffuse/multifocal cerebral dysfunction, likely in part due to sedative effect

## 2025-03-24 NOTE — EEG REPORT - NS EEG TEXT BOX
TOLU VARGAS [id number]     Study Date: 03-23-25 08:00 - 13:05 03-23-25  Duration x Hours: 5 hrs 5 minutes  --------------------------------------------------------------------------------------------------  History:  CC/ HPI Patient is a 44y old  Male who presents with a chief complaint of Referred by Hematologist for low Hg (22 Mar 2025 09:02)    MEDICATIONS  (STANDING):  allopurinol 50 milliGRAM(s) Oral <User Schedule>  aztreonam  IVPB 2000 milliGRAM(s) IV Intermittent <User Schedule>  fentaNYL   Infusion. 0.5 MICROgram(s)/kG/Hr (4.44 mL/Hr) IV Continuous <Continuous>  folic acid 1 milliGRAM(s) Oral daily  influenza   Vaccine 0.5 milliLiter(s) IntraMuscular once  levETIRAcetam   Injectable 750 milliGRAM(s) IV Push daily  metroNIDAZOLE  IVPB 500 milliGRAM(s) IV Intermittent every 8 hours  midazolam Infusion. 0.1 mG/kG/Hr (8.88 mL/Hr) IV Continuous <Continuous>  norepinephrine Infusion 1 MICROgram(s)/kG/Min (167 mL/Hr) IV Continuous <Continuous>  petrolatum Ophthalmic Ointment 1 Application(s) Both EYES two times a day  propofol Infusion 25 MICROgram(s)/kG/Min (13.3 mL/Hr) IV Continuous <Continuous>    --------------------------------------------------------------------------------------------------  Study Interpretation:    [[[Abbreviation Key:  PDR=alpha rhythm/posterior dominant rhythm. A-P=anterior posterior gradient.  Amplitude: ‘very low’:<20; ‘low’:20-50; ‘medium’:; ‘high’:>200uV.  Persistence for periodic/rhythmic patterns (% of epoch) ‘rare’:<1%; ‘occasional’:1-10%; ‘frequent’:10-50%; ‘abundant’:50-90%; ‘continuous’:>90%.  Persistence for sporadic discharges: ‘rare’:<1/hr; ‘occasional’:1/min-1/hr; ‘frequent’:>1/min; ‘abundant’:>1/10 sec.  GRDA=generalized rhythmic delta activity; FIRDA=frontal intermittent GRDA; LRDA=lateralized rhythmic delta activity; TIRDA=temporal intermittent rhythmic delta activity;  LPD=PLED=lateralized periodic discharges; GPD=generalized periodic discharges; BiPDs=BiPLEDs=bilateral independent periodic epileptiform discharges; SIRPID=stimulus induced rhythmic, periodic, or ictal appearing discharges; BIRDs=brief potentially ictal rhythmic discharges >4 Hz, lasting .5-10s; PFA=paroxysmal bursts of beta/gamma; LVFA=low voltage fast activity.  Modifiers: +F=with fast component; +S=with spike component; +R=with rhythmic component.  S-B=burst suppression pattern.  Max=maximal. N1-drowsy; N2-stage II sleep; N3-slow wave sleep. SSS/BETS=small sharp spikes/benign epileptiform transients of sleep. HV=hyperventilation; PS=photic stimulation]]]    Daily EEG Visual Analysis    FINDINGS:      Background:  Continuity: continuous  Symmetry: symmetric  PDR: not discerned  Reactivity: not discerned  Voltage: suppressed  Anterior Posterior Gradient: absent  Other background findings: none  Breach: absent    Background Slowing:  Generalized slowing: none was present.  Focal slowing: none was present.    State Changes:   -Drowsiness not recorded  -N2 sleep transients were not recorded.    Sporadic Epileptiform Discharges:    See below    Rhythmic and Periodic Patterns (RPPs):  None.    Electrographic and Electroclinical seizures:  None    Other Clinical Events:  None    Activation Procedures:   -Hyperventilation was not performed.    -Photic stimulation was not performed.      Artifacts:  Intermittent myogenic and movement artifacts were noted.    ECG:  The heart rate on single channel ECG was predominantly between 70-85 BPM.    EEG Classification / Summary:  Abnormal  EEG in the awake / drowsy / asleep state(s).  Severe diffuse slowing and attenuation.    Clinical Impression:  Severe diffuse/multifocal cerebral dysfunction.      Diann Xiao MD  Epilepsy Attending    -------------------------------------------------------------------------------------------------------  [facility] EEG Reading Room Ph#: [phone]  Epilepsy Answering Service after 5PM and before 8:30AM: Ph#: [phone]

## 2025-03-24 NOTE — PROGRESS NOTE ADULT - SUBJECTIVE AND OBJECTIVE BOX
Patient is a 44y old  Male who presents with a chief complaint of Referred by Hematologist for low Hg (24 Mar 2025 10:45)  Patient seen today, plan for CRRT , remains febrile    MEDICATIONS  (STANDING):  allopurinol 50 milliGRAM(s) Oral <User Schedule>  aztreonam  IVPB 2000 milliGRAM(s) IV Intermittent <User Schedule>  chlorhexidine 0.12% Liquid 15 milliLiter(s) Oral Mucosa every 12 hours  chlorhexidine 2% Cloths 1 Application(s) Topical daily  fentaNYL   Infusion. 0.5 MICROgram(s)/kG/Hr (4.44 mL/Hr) IV Continuous <Continuous>  folic acid 1 milliGRAM(s) Oral daily  influenza   Vaccine 0.5 milliLiter(s) IntraMuscular once  levETIRAcetam 500 milliGRAM(s) Oral two times a day  midodrine 10 milliGRAM(s) Oral every 8 hours  norepinephrine Infusion 1 MICROgram(s)/kG/Min (167 mL/Hr) IV Continuous <Continuous>  petrolatum Ophthalmic Ointment 1 Application(s) Both EYES two times a day  propofol Infusion 25 MICROgram(s)/kG/Min (13.3 mL/Hr) IV Continuous <Continuous>  sodium chloride 3%  Inhalation 4 milliLiter(s) Inhalation every 6 hours    MEDICATIONS  (PRN):        Vital Signs Last 24 Hrs  T(C): 38.7 (24 Mar 2025 12:00), Max: 38.7 (24 Mar 2025 12:00)  T(F): 101.7 (24 Mar 2025 12:00), Max: 101.7 (24 Mar 2025 12:00)  HR: 114 (24 Mar 2025 12:00) (75 - 115)  BP: --  BP(mean): --  RR: 17 (24 Mar 2025 12:00) (17 - 23)  SpO2: 98% (24 Mar 2025 12:00) (87% - 100%)    Parameters below as of 24 Mar 2025 09:26  Patient On (Oxygen Delivery Method): ventilator        PE  Anicteric, MMM  RRR  CTAB  Abd soft, NT, ND  No c/c/e  No rash grossly  generalized anasarca                          7.8    19.29 )-----------( 164      ( 24 Mar 2025 12:00 )             21.1       03-24    134[L]  |  94[L]  |  100[H]  ----------------------------<  143[H]  5.9[H]   |  19[L]  |  5.93[H]    Ca    8.8      24 Mar 2025 08:00  Phos  6.5     03-24  Mg     2.50     03-24    TPro  6.0  /  Alb  2.6[L]  /  TBili  15.8[H]  /  DBili  x   /  AST  247[H]  /  ALT  158[H]  /  AlkPhos  118  03-24

## 2025-03-24 NOTE — CHART NOTE - NSCHARTNOTEFT_GEN_A_CORE
______________ CRITICAL CARE NUTRITION FOLLOW-UP ________________        --Medical Course--  44y Male with PMHx of sickle cell disease admitted for anemia concerning for sickle cell crisis s/p ureteroscopy w/ L kidney biopsy 3/19, post-operative course c/b acute hemorrhagic shock, PEA arrest with ROSC, admitted to MICU for further management.  Acute hypoxic respiratory failure, intubated/sedated.  Pt. with witnessed myoclonic jerking concerning for seizures in setting of anoxic brain injury.  Also, Pt. failed bumex challenge.  Currently with MATA, hyperkalemia and oliguria, per nephro. documentation & will attempt HD and if not tolerated may consider CRRT.    Pt. remains intubated/sedated with Fentanyl & Propofol.  ~702 KCals to be provided by Propofol over 24hrs @ current rate.  Would check triglycerides.       No recent bowel movement.  Advise implementing bowel regimen.   Glucose values elevated, suggest insulin coverage.      --Nutrition Course--  Pt. has been receiving tube feeding with Jevity 1.5. Previous nutrition recommendation was communicated to resident physician for changing prescribed enteral regimen to Nepro with goal of 45mL/hr over 18hrs (11-5a)+ Liquid Protein Supplement (LPS) 3x daily.  This is with consideration of electrolytes trends, elevated K+ and elevated phosphorus. The recommendation for Nepro @ 45mL/hr x 18hrs + LPS 3x daily remains appropriate @ this time & will provide:      810mL total volume  1458 Kcals  (+ 300 KCals from LPS) = 1758 KCals (+ Propofol KCals)  66g protein (+45g protein from LPS) = 111g protein  589mL free water     *TF+LPS will give ~20 KCals/kg actual/dosing weight & 1.5g protein/kg Ideal Body Weight.         __________Diet Order_________  Diet, NPO with Tube Feed:   Tube Feeding Modality: Orogastric  Jevity 1.5 Floyd (JEVITY1.5RTH)  Total Volume for 24 Hours (mL): 720  Continuous  Starting Tube Feed Rate {mL per Hour}: 10  Increase Tube Feed Rate by (mL): 10     Every 4 hours  Until Goal Tube Feed Rate (mL per Hour): 40  Tube Feed Duration (in Hours): 18  Tube Feed Start Time: 12:34 (03-22-25 @ 04:49) [Active]            Weight:      Height:  69" / 175.26cm               Ideal Body Weight: 160lbs / 72.7kg  122kg (3/24)  11.4kg (3/22)  88.8kg (3/19 on admit)        ____Estimated Energy Needs____  20-25 KCals/kg actual/dosing weight (88.8kg)= 5330-7274 KCals/d  1.5-1.8g protein/kg Ideal Body Weight = 109-131g protein/d      Edema:  3+ generalized    Last bowel movement: None recent (x at least 9d?)    Skin: No pressure injuries    ________________________Pertinent Medications____________   MEDICATIONS  (STANDING):  allopurinol 50 milliGRAM(s) Oral <User Schedule>  aztreonam  IVPB 2000 milliGRAM(s) IV Intermittent <User Schedule>  chlorhexidine 0.12% Liquid 15 milliLiter(s) Oral Mucosa every 12 hours  chlorhexidine 2% Cloths 1 Application(s) Topical daily  fentaNYL   Infusion. 0.5 MICROgram(s)/kG/Hr (4.44 mL/Hr) IV Continuous <Continuous>  folic acid 1 milliGRAM(s) Oral daily  influenza   Vaccine 0.5 milliLiter(s) IntraMuscular once  levETIRAcetam 500 milliGRAM(s) Oral two times a day  midodrine 10 milliGRAM(s) Oral every 8 hours  norepinephrine Infusion 1 MICROgram(s)/kG/Min (167 mL/Hr) IV Continuous <Continuous>  petrolatum Ophthalmic Ointment 1 Application(s) Both EYES two times a day  propofol Infusion 25 MICROgram(s)/kG/Min (13.3 mL/Hr) IV Continuous <Continuous>  sodium chloride 3%  Inhalation 4 milliLiter(s) Inhalation every 6 hours    MEDICATIONS  (PRN):          _________________________Pertinent Labs____________________     03-24    134[L]  |  94[L]  |  100[H]  ----------------------------<  143[H]  5.9[H]   |  19[L]  |  5.93[H]    Ca    8.8      24 Mar 2025 08:00  Phos  6.5     03-24  Mg     2.50     03-24        CAPILLARY BLOOD GLUCOSE      POCT Blood Glucose.: 180 mg/dL (03-24-25 @ 11:38)  POCT Blood Glucose.: 148 mg/dL (03-24-25 @ 02:08)  POCT Blood Glucose.: 135 mg/dL (03-24-25 @ 01:42)          PLAN/RECOMMENDATIONS:    1) D/C Jevity.  Change to Nepro with goal of 45mL/hr x 18hrs (11a-5a) + Liquid Protein Supplement (LPS) 3x daily   2) Obtain daily weights  3) Monitor tolerance to tube feeding, GI status, electrolytes, glucose  4) Check triglycerides  5) Implement bowel regimen  6) Advise insulin coverage.    RDN remains available and will f/u PRN.          Courtney Mendez RDN, CDN       pager [phone] or MS Teams

## 2025-03-24 NOTE — CONSULT NOTE ADULT - PROBLEM SELECTOR RECOMMENDATION 3
Pt w/ hyperkalemia iso renal failure. Plan for CRRT (all 0K baths) as above. Monitor labs.    If you have any questions, please feel free to contact me  Blayne Miller  Nephrology Fellow  [employer]/Page [phone]  (After 5pm or on weekends please page the on-call fellow) Pt w/ hyperkalemia iso renal failure. Plan for dialysis as above. Monitor labs.    If you have any questions, please feel free to contact me  Blayne Miller  Nephrology Fellow  [employer]/Page [phone]  (After 5pm or on weekends please page the on-call fellow)

## 2025-03-24 NOTE — PROGRESS NOTE ADULT - ATTENDING COMMENTS
44 year old male with a history of SSD, UE DVT, gout, HTN, with L renal mass admitted with acute anemia/ sickle cell crisis and evaluation of his left renal mass s/p left ureteroscopy and biopsy (3/19/25) with 24 hour post operative course complicated by rapid responses for hypoglycemia c/b cardiac arrest x 4 minutes CPR/1 epi with ROSC, transferred to MICU for further care     Post rosc found to be profoundly anemic, acidemic, with multiorgan dysfunction overall concerning for acute hemorrhagic shock.   Now with concerns for anoxia. CTH with loss of grey white differentiation. Was pending MRI and repeat CTH.     # Cardiac arrest  # Anoxic encephalopathy  # SZ vs myoclonus  # Shock, on pressors  # Anemia  #SSD  # Transaminitis  # MATA 2/2 to ATN  # DVT  # Hyperbilirubinemia, Early cirrhosis?  # Aspiraiton pneumonia    - Cardiac arrest, short down time but with likely anoxic injury and multiorgan dysfunction in setting of hemorrage, time of arrest Hg was 3 and hypoglcemia  - Pending Repeat CT, will likely still need MRI, NSE pending. Neuro following for possible sz vs myoclonus  - Wean sedation as tolerated to better assess mental status  - Shock, likely vasoplegia, wean as tolerated. added midodrine  - Transfuse PRN, now s/p embolization by IR  - SSD, monitor hemolysis labs. SS labs  - Shocked liver, but with LFT elevation even prior. Mostly direct bilirubin, Check ammonia, Early cirrhosis on US. Trend bili  - MATA, now with hyperkalcemia. Pending HD and RRT. Temporized K x2.   - DVT no A/C 2/2 to bleeding  - on Aztrenonam and flagyl, d/c flagyl for now. F/U with cultures  - DVT ppx- SQH  - Dispo- full code. DW brother today, Ongoing GOC.

## 2025-03-24 NOTE — PROGRESS NOTE ADULT - ATTENDING COMMENTS
Detailed neuro exam:  ROS: Unable to obtain due to the patient is currently orally intubated.  Please note, neuro exam is very limited due to the patient on mechanical ventilation via orally intubated and on the propofol.  General: Patient is comfortably lying on bed without any acute distress.  Mental status: On verbal command, patient unable to follow any simple or complex commands.  Cranial exams: Brainstem reflexes are intact cornea, conjunctiva and gag reflexes. Face looks symmetric. Pupils are symmetric, reactive to light bilaterally.  Power: On noxious stimuli the patient had 0/5 bilateral four extremities.  Sensation: On noxious stimuli patient did not grimace at all four extremities.  Coordination and gait: Patient did not participate in the setting of comatose.     Impression and plan:  Ms. Downey is a 44 year old man unknown handed man with PMHX sickle cell prior acute chest syndrome, iron overload, Gout, HTN, RUE DVT (was on AC), renal lesion who was admitted due to the sickle cell crisis, received pRBCs and  s/p L ureteroscopy w/ biopsy and stent placement.  hospital course complicated by ams/hypoglycemia and code blue 3/20 ~930am for PEA arrest.  Neurology consulted because of myoclonus s/p cardiac arrest. During my evaluation, patient remains on sedation and neuro assessment is limited. Electroclinically, patient remains myoclonus seizure free after  21:00 . Clinically unchanged as compared previous exam.  Pending MRI brain     CT scan head to my shows loss of gray-white differentiation ? early sing of hypoxic ischemic brain injury.    Patient would benefit from MRI brain with and without contrast once patient stable. Or repeat CT scan head.   Continue VEEG  Continue Keppra 750 mg BID.   Continue medical management, neuro- check and fall precaution.  GI and DVT prophylaxis.    Patient is at high risk for complications and morbidity or mortality. There is high probability of imminent or life threatening deterioration in the patient's condition.  Patient is unable or incompetent to participate in giving a history and/or making decisions and discussion is necessary for determining treatment decisions.    I discussed the diagnosis, treatment plan and prognosis with the patient's family. Risk benefit and alternatives to the treatment were discussed. All questions and concerns were addressed. The patient's family demonstrated good understanding of the treatment plan.    My cumulative time taking care of this critically ill patient is 55 minutes  If you have any further questions, please do not hesitate to contact our team.  Thank you for allowing us to participate in this patient care.

## 2025-03-24 NOTE — PROGRESS NOTE ADULT - ASSESSMENT
Patient is 44y Male with PMHx of sickle cell disease admitted for anemia concerning for sickle cell crisis s/p ureteroscopy w/ L kidney biopsy 3/19, post-operative course c/b acute hemorrhagic shock, PEA arrest with ROSC, admitted to MICU for further management    NEURO:  #Sedated  - C/w propofol, fent, wean as tolerated   - S/p versed gtt, weaned off 3/23    #Seizures  - Witness myoclonic jerking concerning for seizures iso anoxic brain injury  - s/p 2.5g keppra load  - vEEG report: "Abundant myoclonic seizures in the setting of a severe diffuse/multifocal cerebral dysfunction which can be seen in the setting of sedative effect or due to an anoxic injury, with improvement after 20:50 suggesting a lowering of sedation"; will f/u with neuro recommendations  - c/w keppra 500mg q8h per neuro   - Trend enolase   - F/u MRI brain to eval for anoxic brain injury     CV:  #Shock - hemorrhagic shock s/p kidney biopsy s/p 5u pRBC, 3 plasma, 3 plt  last dose of lovenox AC on 3/18 at 5PM   Hgb 8 -> ~3   - C/w Levo, MAP goal>65  - Monitor cbc q24    #Hx of VTE (R IJ thrombus, L brachial DVT)  - CTM, hold AC given hemorrhage  - Bullae present on arm with DVT,    PULM:  #Acute hypoxic respiratory failure  In the setting of PEA arrest   CXR with R lung field and left mid and lower lung field hazy opacities.  - Abx as below  - Wean vent as able  - HTS nebs, duonebs    RENAL:  #MATA  #Renal mass s/p biopsy  #Acidosis  Likely ATN. Currently w/ anuria concerning for acute renal failure   s/p failed bumex challenge   - Dave  - Trend Cr, uop, lytes  - S/p bicarb drip  - Discuss CRRT/HD with family, no current indication      GI:  #Shock liver  - Trend liver enzymes  - F/u RUQ US    #Diet:  - NPO w/ tube feed     HEMATOLOGIC:  #Sickle cell crisis  #Acute blood loss enamia  - Trend hemolysis labs, coags  - heme following     #DVT prophylaxis - SCD    Endo:  #JUAN    ID:  Febrile overnight, ?aspiration PNA  - C/w aztreonam and flagyl (patient w/ multiple listed allergies) w/ plan for 5-7d course    MSK:  #infiltration  Infiltration of sodium bicarb into left arm, now with arm distension and bullae  - Vascular surgery consulted for compartment syndrome concern  - CTM    Ethics: Full Code      Patient is 44y Male with PMHx of sickle cell disease admitted for anemia concerning for sickle cell crisis s/p ureteroscopy w/ L kidney biopsy 3/19, post-operative course c/b acute hemorrhagic shock, PEA arrest with ROSC; currently with jaundice and hyperbilirubinemia     NEURO:  #Sedated  AAOx0. GCS 3.   - C/w propofol, fent, wean as tolerated   - S/p versed gtt, weaned off 3/23    #Seizures  No further seizure-like activity observed.   - Witness myoclonic jerking concerning for seizures iso anoxic brain injury  - s/p 2.5g keppra load  - vEEG report: "Abundant myoclonic seizures in the setting of a severe diffuse/multifocal cerebral dysfunction which can be seen in the setting of sedative effect or due to an anoxic injury, with improvement after 20:50 suggesting a lowering of sedation"; will f/u with neuro recommendations  - c/w keppra 500mg q8h per neuro   - Trend enolase   - Repeat CTB wo C for prognostication; pending MRI brain to eval for anoxic brain injury     CV:  #Shock - hemorrhagic shock s/p kidney biopsy s/p 5u pRBC, 3 plasma, 3 plt  Hemodynamically stable.   last dose of lovenox AC on 3/18 at 5PM   Hgb 8 -> ~3   - C/w Levo, MAP goal>65  - Monitor cbc q24  -Initiate midodrine 10 mg po TID     #Hx of VTE (R IJ thrombus, L brachial DVT)  - CTM, hold AC given hemorrhage  - Bullae present on arm with DVT,    PULM:  #Acute hypoxic respiratory failure  In the setting of PEA arrest   CXR with R lung field and left mid and lower lung field hazy opacities.  - Abx as below  - Wean vent as able  - HTS nebs, duonebs    RENAL:  #MATA  #Renal mass s/p biopsy  #Acidosis  Likely ATN. Currently w/ anuria. Worsening Scr. Urgent hemodialysis.   s/p failed bumex challenge   - Dave  - Trend Cr, uop, lytes  - S/p bicarb drip  - Per nephro, starting hemodialysis; planning R IJV placement at bedside       GI:  #Shock liver  Downtrending LFTs. Jaundice with hyperbilirubinemia (direct).   - Trend liver enzymes  - RUQ US showing enlarged periportal and periaortic enlarged lymph nodes and enlarged left lateral liver lobe and CBD 9 mm.     #Diet:  - NPO w/ tube feed     HEMATOLOGIC:  #Sickle cell crisis  #Acute blood loss enamia  Elevated indirect hyperbilirubinemia. Low suspicion for currently active peripheral hemolysis.   - heme following   -D/c deferasirox due to curren renal failure     #DVT prophylaxis - SCD    Endo:  #JUAN    ID:  Afebrile. Downtrending leukocytosis. Bullae and desquamation without active signs of infection in LUE.   - C/w aztreonam and flagyl (patient w/ multiple listed allergies) w/ plan for 5-7d course    MSK:  #infiltration  Infiltration of sodium bicarb into left arm, now with arm distension and bullae. Elevated uric acid with nodule suggegstive of podagra however no signs of active gout flare.   - Vascular surgery consulted for compartment syndrome concern  - CTM    Ethics: Full Code

## 2025-03-25 LAB
ALBUMIN SERPL ELPH-MCNC: 2.6 G/DL — LOW (ref 3.3–5)
ALP SERPL-CCNC: 114 U/L — SIGNIFICANT CHANGE UP (ref 40–120)
ALT FLD-CCNC: 124 U/L — HIGH (ref 4–41)
ANION GAP SERPL CALC-SCNC: 18 MMOL/L — HIGH (ref 7–14)
APTT BLD: 30.9 SEC — SIGNIFICANT CHANGE UP (ref 24.5–35.6)
AST SERPL-CCNC: 187 U/L — HIGH (ref 4–40)
BASOPHILS # BLD AUTO: 0.12 K/UL — SIGNIFICANT CHANGE UP (ref 0–0.2)
BASOPHILS NFR BLD AUTO: 0.6 % — SIGNIFICANT CHANGE UP (ref 0–2)
BILIRUB DIRECT SERPL-MCNC: 14.5 MG/DL — SIGNIFICANT CHANGE UP (ref 0–0.3)
BILIRUB INDIRECT FLD-MCNC: 1.3 MG/DL — SIGNIFICANT CHANGE UP (ref 0–1)
BILIRUB SERPL-MCNC: 15.6 MG/DL — HIGH (ref 0.2–1.2)
BILIRUB SERPL-MCNC: 15.8 MG/DL — HIGH (ref 0.2–1.2)
BLD GP AB SCN SERPL QL: NEGATIVE — SIGNIFICANT CHANGE UP
BLOOD GAS ARTERIAL COMPREHENSIVE RESULT: SIGNIFICANT CHANGE UP
BUN SERPL-MCNC: 72 MG/DL — HIGH (ref 7–23)
CA-I BLD-SCNC: 1.05 MMOL/L — LOW (ref 1.15–1.29)
CALCIUM SERPL-MCNC: 8.7 MG/DL — SIGNIFICANT CHANGE UP (ref 8.4–10.5)
CHLORIDE SERPL-SCNC: 96 MMOL/L — LOW (ref 98–107)
CK SERPL-CCNC: 158 U/L — SIGNIFICANT CHANGE UP (ref 30–200)
CO2 SERPL-SCNC: 21 MMOL/L — LOW (ref 22–31)
CREAT SERPL-MCNC: 4.62 MG/DL — HIGH (ref 0.5–1.3)
CULTURE RESULTS: SIGNIFICANT CHANGE UP
EGFR: 15 ML/MIN/1.73M2 — LOW
EGFR: 15 ML/MIN/1.73M2 — LOW
EOSINOPHIL # BLD AUTO: 0.34 K/UL — SIGNIFICANT CHANGE UP (ref 0–0.5)
EOSINOPHIL NFR BLD AUTO: 1.8 % — SIGNIFICANT CHANGE UP (ref 0–6)
GLUCOSE SERPL-MCNC: 113 MG/DL — HIGH (ref 70–99)
HAPTOGLOB SERPL-MCNC: <20 MG/DL — LOW (ref 34–200)
HCT VFR BLD CALC: 21.4 % — LOW (ref 39–50)
HGB BLD-MCNC: 7.8 G/DL — LOW (ref 13–17)
IANC: 13.64 K/UL — HIGH (ref 1.8–7.4)
IMM GRANULOCYTES NFR BLD AUTO: 1.7 % — HIGH (ref 0–0.9)
INR BLD: 1.05 RATIO — SIGNIFICANT CHANGE UP (ref 0.85–1.16)
LDH SERPL L TO P-CCNC: 1200 U/L — HIGH (ref 135–225)
LYMPHOCYTES # BLD AUTO: 1.78 K/UL — SIGNIFICANT CHANGE UP (ref 1–3.3)
LYMPHOCYTES # BLD AUTO: 9.4 % — LOW (ref 13–44)
MAGNESIUM SERPL-MCNC: 2.3 MG/DL — SIGNIFICANT CHANGE UP (ref 1.6–2.6)
MCHC RBC-ENTMCNC: 29.9 PG — SIGNIFICANT CHANGE UP (ref 27–34)
MCHC RBC-ENTMCNC: 36.4 G/DL — HIGH (ref 32–36)
MCV RBC AUTO: 82 FL — SIGNIFICANT CHANGE UP (ref 80–100)
MONOCYTES # BLD AUTO: 2.79 K/UL — HIGH (ref 0–0.9)
MONOCYTES NFR BLD AUTO: 14.7 % — HIGH (ref 2–14)
MRSA PCR RESULT.: SIGNIFICANT CHANGE UP
NEUTROPHILS # BLD AUTO: 13.64 K/UL — HIGH (ref 1.8–7.4)
NEUTROPHILS NFR BLD AUTO: 71.8 % — SIGNIFICANT CHANGE UP (ref 43–77)
NRBC # BLD AUTO: 0.11 K/UL — HIGH (ref 0–0)
NRBC # FLD: 0.11 K/UL — HIGH (ref 0–0)
NRBC BLD AUTO-RTO: 0 /100 WBCS — SIGNIFICANT CHANGE UP (ref 0–0)
PHOSPHATE SERPL-MCNC: 6.1 MG/DL — HIGH (ref 2.5–4.5)
PLATELET # BLD AUTO: 150 K/UL — SIGNIFICANT CHANGE UP (ref 150–400)
POTASSIUM SERPL-MCNC: 5 MMOL/L — SIGNIFICANT CHANGE UP (ref 3.5–5.3)
POTASSIUM SERPL-SCNC: 5 MMOL/L — SIGNIFICANT CHANGE UP (ref 3.5–5.3)
PROT SERPL-MCNC: 5.6 G/DL — LOW (ref 6–8.3)
PROTHROM AB SERPL-ACNC: 12.5 SEC — SIGNIFICANT CHANGE UP (ref 9.9–13.4)
RBC # BLD: 2.61 M/UL — LOW (ref 4.2–5.8)
RBC # BLD: 2.61 M/UL — LOW (ref 4.2–5.8)
RBC # FLD: 18.4 % — HIGH (ref 10.3–14.5)
RETICS #: 80.6 K/UL — SIGNIFICANT CHANGE UP (ref 25–125)
RETICS/RBC NFR: 3.1 % — HIGH (ref 0.5–2.5)
RH IG SCN BLD-IMP: POSITIVE — SIGNIFICANT CHANGE UP
S AUREUS DNA NOSE QL NAA+PROBE: DETECTED
SODIUM SERPL-SCNC: 135 MMOL/L — SIGNIFICANT CHANGE UP (ref 135–145)
SPECIMEN SOURCE: SIGNIFICANT CHANGE UP
SURGICAL PATHOLOGY STUDY: SIGNIFICANT CHANGE UP
URATE SERPL-MCNC: 7.7 MG/DL — SIGNIFICANT CHANGE UP (ref 3.4–8.8)
WBC # BLD: 19 K/UL — HIGH (ref 3.8–10.5)
WBC # FLD AUTO: 19 K/UL — HIGH (ref 3.8–10.5)

## 2025-03-25 PROCEDURE — 71045 X-RAY EXAM CHEST 1 VIEW: CPT | Mod: 26

## 2025-03-25 PROCEDURE — 99291 CRITICAL CARE FIRST HOUR: CPT | Mod: GC

## 2025-03-25 PROCEDURE — 99233 SBSQ HOSP IP/OBS HIGH 50: CPT | Mod: GC

## 2025-03-25 PROCEDURE — 70553 MRI BRAIN STEM W/O & W/DYE: CPT | Mod: 26

## 2025-03-25 PROCEDURE — 99231 SBSQ HOSP IP/OBS SF/LOW 25: CPT

## 2025-03-25 RX ORDER — LEVETIRACETAM 10 MG/ML
500 INJECTION, SOLUTION INTRAVENOUS
Refills: 0 | Status: DISCONTINUED | OUTPATIENT
Start: 2025-03-25 | End: 2025-03-28

## 2025-03-25 RX ORDER — MIDAZOLAM IN 0.9 % SOD.CHLORID 1 MG/ML
4 PLASTIC BAG, INJECTION (ML) INTRAVENOUS ONCE
Refills: 0 | Status: DISCONTINUED | OUTPATIENT
Start: 2025-03-25 | End: 2025-03-25

## 2025-03-25 RX ORDER — LEVETIRACETAM 10 MG/ML
750 INJECTION, SOLUTION INTRAVENOUS
Refills: 0 | Status: DISCONTINUED | OUTPATIENT
Start: 2025-03-25 | End: 2025-03-25

## 2025-03-25 RX ORDER — LEVETIRACETAM 10 MG/ML
500 INJECTION, SOLUTION INTRAVENOUS
Refills: 0 | Status: DISCONTINUED | OUTPATIENT
Start: 2025-03-25 | End: 2025-03-25

## 2025-03-25 RX ORDER — MIDODRINE HYDROCHLORIDE 5 MG/1
20 TABLET ORAL EVERY 8 HOURS
Refills: 0 | Status: DISCONTINUED | OUTPATIENT
Start: 2025-03-25 | End: 2025-03-28

## 2025-03-25 RX ADMIN — Medication 1 APPLICATION(S): at 12:31

## 2025-03-25 RX ADMIN — FOLIC ACID 1 MILLIGRAM(S): 1 TABLET ORAL at 12:30

## 2025-03-25 RX ADMIN — NOREPINEPHRINE BITARTRATE 167 MICROGRAM(S)/KG/MIN: 8 SOLUTION at 08:52

## 2025-03-25 RX ADMIN — Medication 4.44 MICROGRAM(S)/KG/HR: at 08:52

## 2025-03-25 RX ADMIN — LEVETIRACETAM 500 MILLIGRAM(S): 10 INJECTION, SOLUTION INTRAVENOUS at 21:47

## 2025-03-25 RX ADMIN — PROPOFOL 13.3 MICROGRAM(S)/KG/MIN: 10 INJECTION, EMULSION INTRAVENOUS at 08:51

## 2025-03-25 RX ADMIN — MIDODRINE HYDROCHLORIDE 10 MILLIGRAM(S): 5 TABLET ORAL at 05:12

## 2025-03-25 RX ADMIN — Medication 4 MILLIGRAM(S): at 07:27

## 2025-03-25 RX ADMIN — AZTREONAM 200 MILLIGRAM(S): 2 INJECTION, POWDER, LYOPHILIZED, FOR SOLUTION INTRAMUSCULAR; INTRAVENOUS at 12:30

## 2025-03-25 RX ADMIN — Medication 1 APPLICATION(S): at 05:12

## 2025-03-25 RX ADMIN — LEVETIRACETAM 500 MILLIGRAM(S): 10 INJECTION, SOLUTION INTRAVENOUS at 05:12

## 2025-03-25 RX ADMIN — Medication 15 MILLILITER(S): at 05:12

## 2025-03-25 RX ADMIN — MIDODRINE HYDROCHLORIDE 20 MILLIGRAM(S): 5 TABLET ORAL at 14:51

## 2025-03-25 NOTE — PROGRESS NOTE ADULT - PROBLEM SELECTOR PLAN 1
Pt w/ oliguric MATA iso hemorrhagic shock and PEA arrest. Likely ATN. NTDC placed and underwent HD on 3/34 iso worsening oliguric renal failure, w/ associated hyperkalemia and hypervolemia. Labs from this morning reviewed. Electrolytes within acceptable range. 24hr UOP ~470cc, w/ 10-40cc/hr. UA (3/16) w/ proteinuria and hematuria (21 RBC). Renal US (3/20) w/o hydronephrosis, and w/ large left renal subcapsular hematoma. CXR (3/22) w/ b/l hazy opacities. Pt hemodynamically unstable, volume overloaded. Recommend bumex 3mg IVP x1. Rpt UA, check UPCR. Monitor labs, VS and UOP. Avoid nephrotoxins when possible. Dose medications for HD. Pt w/ oliguric MATA iso hemorrhagic shock and PEA arrest. Likely ATN. NTDC placed and underwent HD on 3/34 iso worsening oliguric renal failure, w/ associated hyperkalemia and hypervolemia. Labs from this morning reviewed. Electrolytes within acceptable range. 24hr UOP ~470cc, w/ 10-40cc/hr. UA (3/16) w/ proteinuria and hematuria (21 RBC). Renal US (3/20) w/o hydronephrosis, and w/ large left renal subcapsular hematoma. CXR (3/22) w/ b/l hazy opacities. Pt hemodynamically unstable, volume overloaded. Plan for HD today, orders placed. Rpt UA, check UPCR. Monitor labs, VS and UOP. Avoid nephrotoxins when possible. Dose medications for HD.

## 2025-03-25 NOTE — PROGRESS NOTE ADULT - SUBJECTIVE AND OBJECTIVE BOX
Neurology Progress Note    SUBJECTIVE/OBJECTIVE/INTERVAL EVENTS: Patient seen and examined at bedside w/ neuro attending and team.     INTERVAL HISTORY: No acute events overnight.     REVIEW OF SYSTEMS: Unable to obtain as pt is intubated and sedated. Continues on propofol and fentanyl for sedation.     VITALS & EXAMINATION:  Vital Signs Last 24 Hrs  T(C): 37.2 (23 Mar 2025 08:00), Max: 38.1 (22 Mar 2025 12:00)  T(F): 99 (23 Mar 2025 08:00), Max: 100.6 (22 Mar 2025 12:00)  HR: 83 (23 Mar 2025 09:00) (83 - 106)  BP: --  BP(mean): --  RR: 20 (23 Mar 2025 09:00) (14 - 24)  SpO2: 98% (23 Mar 2025 09:00) (92% - 100%)    Parameters below as of 23 Mar 2025 08:00  Patient On (Oxygen Delivery Method): ventilator    O2 Concentration (%): 40    Altered mental status - On sedation with Propofol 50mcg/kg/min, Fentanyl -Not Following commands   Does not track, does not attend to examiner  - on propofol,  on MV  Cranial reflexes- Pupils B/L equal 2mm sluggishly reacting to light, corneal reflex + bilaterally, gag reflex +  Motor and sensory- no response to pain, no purposeful movements noted  -Tone - decreased throughout   - DTRs not tested        LABORATORY:  CBC                       8.2    14.83 )-----------( 162      ( 23 Mar 2025 01:05 )             22.9     Chem 03-23    133[L]  |  93[L]  |  93[H]  ----------------------------<  133[H]  4.9   |  20[L]  |  4.94[H]    Ca    9.0      23 Mar 2025 05:02  Phos  5.0     03-23  Mg     2.40     03-23    TPro  x   /  Alb  x   /  TBili  x   /  DBili  13.2[H]  /  AST  x   /  ALT  x   /  AlkPhos  x   03-22    LFTs LIVER FUNCTIONS - ( 22 Mar 2025 00:11 )  Alb: 2.7 g/dL / Pro: 5.0 g/dL / ALK PHOS: 96 U/L / ALT: 288 U/L / AST: 553 U/L / GGT: x           Coagulopathy PT/INR - ( 23 Mar 2025 01:05 )   PT: 13.0 sec;   INR: 1.12 ratio         PTT - ( 23 Mar 2025 01:05 )  PTT:29.9 sec  Lipid Panel   A1c   Cardiac enzymes     U/A Urinalysis Basic - ( 23 Mar 2025 05:02 )    Color: x / Appearance: x / SG: x / pH: x  Gluc: 133 mg/dL / Ketone: x  / Bili: x / Urobili: x   Blood: x / Protein: x / Nitrite: x   Leuk Esterase: x / RBC: x / WBC x   Sq Epi: x / Non Sq Epi: x / Bacteria: x      CSF  Immunological  Other    STUDIES & IMAGING: (EEG, CT, MR, U/S, TTE/PA):      EEG  Study Date: 03-22-25 08:00 - 08:00 03-23-25  Duration x Hours: 24 hrs  Clinical Impression:    1. GPDs can be seen in the setting of toxic/metabolic etiologies and can be associated with seizures at frequencies > 2.5 Hz  2. Risk of focal onset seizures in the left posterior temporal region  3. Severe diffuse/multifocal cerebral dysfunction, likely in part due to sedative effect

## 2025-03-25 NOTE — PROGRESS NOTE ADULT - ATTENDING COMMENTS
After my evaluation, patient's family member including brother were present and I appreciate their involvement and support. I spoke with them in details regarding recent CT scan brain.     Detailed neuro exam:  ROS: Unable to obtain due to the patient is currently orally intubated.  Please note, neuro exam is very limited due to the patient on mechanical ventilation via orally intubated and on the propofol.  General: Patient is comfortably lying on bed without any acute distress.  Mental status: On verbal command, patient unable to follow any simple or complex commands.  Cranial exams: Brainstem reflexes are intact cornea, conjunctiva and gag reflexes. Face looks symmetric. Pupils are symmetric, reactive to light bilaterally.  Power: On noxious stimuli the patient had 0/5 bilateral four extremities.  Sensation: On noxious stimuli patient did not grimace at all four extremities.  Coordination and gait: Patient did not participate in the setting of comatose.     Impression and plan:  Ms. Downey is a 44 year old man unknown handed man with PMHX sickle cell prior acute chest syndrome, iron overload, Gout, HTN, RUE DVT (was on AC), renal lesion who was admitted due to the sickle cell crisis, received pRBCs and  s/p L ureteroscopy w/ biopsy and stent placement.  hospital course complicated by ams/hypoglycemia and code blue 3/20 ~930am for PEA arrest.  Neurology consulted because of myoclonus s/p cardiac arrest. During my evaluation, patient remains on sedation and neuro assessment is limited. Electroclinically, patient remains myoclonus seizure free after  21:00 . CT Brain to my eye is consistent with diffuse anoxic brain injury. Overall, the prognosis for meaningful neurological recovery appears poor.   I discussed the diagnosis, treatment plan and prognosis with the patient's family. Risk benefit and alternatives to the treatment were discussed. All questions and concerns were addressed. The patient's family demonstrated good understanding of the treatment plan.    CT scan head to my shows loss of gray-white differentiation anoxic ischemic brain injury.  Patient would benefit from MRI brain with and without contrast once patient stable as per family request.   Continue Keppra 750 mg BID.   Continue medical management, neuro- check and fall precaution.  GI and DVT prophylaxis.    Patient is at high risk for complications and morbidity or mortality. There is high probability of imminent or life threatening deterioration in the patient's condition.  Patient is unable or incompetent to participate in giving a history and/or making decisions and discussion is necessary for determining treatment decisions.    My cumulative time taking care of this critically ill patient is 55 minutes  If you have any further questions, please do not hesitate to contact our team.  Thank you for allowing us to participate in this patient care. After my evaluation, patient's family member including brother were present and I appreciate their involvement and support. I spoke with them in details regarding recent CT scan brain.     Detailed neuro exam:  ROS: Unable to obtain due to the patient is currently orally intubated.  Please note, neuro exam is very limited due to the patient on mechanical ventilation via orally intubated and on the propofol.  General: Patient is comfortably lying on bed without any acute distress.  Mental status: On verbal command, patient unable to follow any simple or complex commands.  Cranial exams: Brainstem reflexes are intact cornea, conjunctiva and gag reflexes. Face looks symmetric. Pupils are symmetric, reactive to light bilaterally.  Power: On noxious stimuli the patient had 0/5 bilateral four extremities.  Sensation: On noxious stimuli patient did not grimace at all four extremities.  Coordination and gait: Patient did not participate in the setting of comatose.     Impression and plan:  Mr. Downey is a 44 year old man unknown handed man with PMHX sickle cell prior acute chest syndrome, iron overload, Gout, HTN, RUE DVT (was on AC), renal lesion who was admitted due to the sickle cell crisis, received pRBCs and  s/p L ureteroscopy w/ biopsy and stent placement.  hospital course complicated by ams/hypoglycemia and code blue 3/20 ~930am for PEA arrest.  Neurology consulted because of myoclonus s/p cardiac arrest. During my evaluation, patient remains on sedation and neuro assessment is limited. Electroclinically, patient remains myoclonus seizure free after  21:00 . CT Brain to my eye is consistent with diffuse anoxic brain injury. Overall, the prognosis for meaningful neurological recovery appears poor.   I discussed the diagnosis, treatment plan and prognosis with the patient's family. Risk benefit and alternatives to the treatment were discussed. All questions and concerns were addressed. The patient's family demonstrated good understanding of the treatment plan.    CT scan head to my shows loss of gray-white differentiation anoxic ischemic brain injury.  Patient would benefit from MRI brain with and without contrast once patient stable as per family request.   Continue Keppra 750 mg BID.   Continue medical management, neuro- check and fall precaution.  GI and DVT prophylaxis.    Patient is at high risk for complications and morbidity or mortality. There is high probability of imminent or life threatening deterioration in the patient's condition.  Patient is unable or incompetent to participate in giving a history and/or making decisions and discussion is necessary for determining treatment decisions.    My cumulative time taking care of this critically ill patient is 55 minutes  If you have any further questions, please do not hesitate to contact our team.  Thank you for allowing us to participate in this patient care.

## 2025-03-25 NOTE — PROGRESS NOTE ADULT - ATTENDING COMMENTS
44 year old male with a history of SSD, UE DVT, gout, HTN, with L renal mass admitted with acute anemia/ sickle cell crisis and evaluation of his left renal mass s/p left ureteroscopy and biopsy (3/19/25) with 24 hour post operative course complicated by rapid responses for hypoglycemia c/b cardiac arrest x 4 minutes CPR/1 epi with ROSC, transferred to MICU for further care     Post rosc found to be profoundly anemic, acidemic, with multiorgan dysfunction overall concerning for acute hemorrhagic shock.   Now with concerns for anoxia. CTH with loss of grey white differentiation. Repeat CT again with diffuse loss of grey white differentiation. Pending MRI.     # Cardiac arrest  # Anoxic encephalopathy  # SZ vs myoclonus  # Shock, on pressors  # Anemia  #SSD  # Transaminitis  # MATA 2/2 to ATN  # DVT  # Hyperbilirubinemia, Early cirrhosis?  # Aspiraiton pneumonia  - Cardiac arrest, short down time but with likely anoxic injury and multiorgan dysfunction in setting of hemorrage, time of arrest Hg was 3 and hypoglcemia  - Repeat CT  diffuse anoxia, Pending MRI, NSE pending. Neuro following for possible sz vs myoclonus  - Wean sedation as tolerated to better assess mental status  - Shock, likely vasoplegia, wean as tolerated. added midodrine  - Transfuse PRN, now s/p embolization by IR  - SSD, monitor hemolysis labs. SS labs  - Shocked liver, but with LFT elevation even prior. Mostly direct bilirubin, Check ammonia, Early cirrhosis on US. Trend bili  - MATA. s/p HD. Another session today.    - DVT no A/C 2/2 to bleeding  - on Aztrenonam and flagyl, d/c flagyl for now. F/U with cultures Will treat for 5 days.   - DVT ppx- SCD  - Dispo- full code. DW brother today, Ongoing GOC.

## 2025-03-25 NOTE — PROGRESS NOTE ADULT - ASSESSMENT
44year old man PMHX sickle cell prior acute chest syndrome, iron overload, Gout, HTN, RUE DVT (was on AC), renal lesion  -admitted 3/15/25 sickle cell crisis, received pRBCs and  s/p L ureteroscopy w/ biopsy and stent placement on 3/19.  -RRT 3/20 9am for ams/hypoglycemia, code blue 3/20 ~930am for PEA arrest attributed to hypoglycemia. Time to rosc from start of compressions 4 minutes but unknown total down time   -3/20 ~1400 Developed myoclonic movements of head, abdomen, LLE starting 3/20 ~1400, increased in frequency overnight into 3/21. Also in MICU hemorrhage shock due to RP bleed s/p embo with IR        Impression:  -PEA arrest in the setting of hypoglycemia leading to anoxic brain injury and post anoxic myoclonic status epilepticus involving jaw and occasionally impeding vent flow  -Prognosis for meaningful neurological recovery is guarded. Time to ROSC from start of CPR is only 4 minutes but total downtime unknown (<30mins) is overall indeterminant. anoxic injury on CT and early post arrest myoclonus are unfavorable.     Recommendations:  Investigations:  -Video EEG   -repeat CTH concerning for severe anoxic brain injury, would not require an MRI from our standpoint given visible changes on repeat CT head  -Check Neuron Specific Enolase (NSE) on 24,48, 72 hours   -Please obtain first NSE 24-48 hrs post-cardiac arrest (Between 930am 3/21 and 930am 3/22)  -Please obtain second NSE 24 hours after drawing first NSE     Management:  -GOC per primary team  -continue keppra 500 mg TID (dose reduced due to decreased kidney function)  -C/W Sedation as per primary team, recommend weaning  -If no improvement with increasing midazolam, -Can load levetiracetam 2500mg (~30mg/kg) and continue 500mg Q8 (based on current CrCl of 48.8ml/min) - if kidney functions dramatically worsens will need dose decrease. (VPA another option but avoiding for now given liver issues)    Note finalized upon attestation by the Attending.     44year old man PMHX sickle cell prior acute chest syndrome, iron overload, Gout, HTN, RUE DVT (was on AC), renal lesion  -admitted 3/15/25 sickle cell crisis, received pRBCs and  s/p L ureteroscopy w/ biopsy and stent placement on 3/19.  -RRT 3/20 9am for ams/hypoglycemia, code blue 3/20 ~930am for PEA arrest attributed to hypoglycemia. Time to rosc from start of compressions 4 minutes but unknown total down time   -3/20 ~1400 Developed myoclonic movements of head, abdomen, LLE starting 3/20 ~1400, increased in frequency overnight into 3/21. Also in MICU hemorrhage shock due to RP bleed s/p embo with IR    Impression:  -PEA arrest in the setting of hypoglycemia leading to anoxic brain injury and post anoxic myoclonic status epilepticus involving jaw and occasionally impeding vent flow  -Prognosis for meaningful neurological recovery is guarded. Time to ROSC from start of CPR is only 4 minutes but total downtime unknown (<30mins) is overall indeterminant. anoxic injury on CT and early post arrest myoclonus are unfavorable.     Recommendations:  Investigations:  -Video EEG   -repeat CTH concerning for severe anoxic brain injury, would not require an MRI from our standpoint given visible changes on repeat CT head  -Check Neuron Specific Enolase (NSE) on 24,48, 72 hours   -Please obtain first NSE 24-48 hrs post-cardiac arrest (Between 930am 3/21 and 930am 3/22)  -Please obtain second NSE 24 hours after drawing first NSE     Management:  -GOC per primary team  -continue keppra 500 mg TID (dose reduced due to decreased kidney function)  -C/W Sedation as per primary team, recommend weaning  -If no improvement with increasing midazolam, -Can load levetiracetam 2500mg (~30mg/kg) and continue 500mg Q8 (based on current CrCl of 48.8ml/min) - if kidney functions dramatically worsens will need dose decrease. (VPA another option but avoiding for now given liver issues)    Note finalized upon attestation by the Attending.

## 2025-03-25 NOTE — PROGRESS NOTE ADULT - SUBJECTIVE AND OBJECTIVE BOX
Subjective  Patient remains intubated and sedated. Pending MRI brain. Patient had central line placed yesterday and started on HD.     Objective    Vital signs  T(F): , Max: 101.7 (03-24-25 @ 12:00)  HR: 91 (03-25-25 @ 07:00)  BP: --  SpO2: 95% (03-25-25 @ 07:00)  Wt(kg): --    Output     OUT:    Indwelling Catheter - Urethral (mL): 470 mL  Total OUT: 470 mL    Total NET: -470 mL          Gen: NAD  Abd: soft, nontender, nondistended  : daniel secured in place, draining CYU    Labs      03-25 @ 00:48    WBC 19.00 / Hct 21.4  / SCr 4.62     03-24 @ 16:51    WBC --    / Hct --    / SCr 6.41         Culture - Sputum (collected 03-22-25 @ 17:15)  Source: ET Tube ET Tube  Gram Stain (03-22-25 @ 23:42):    Numerous polymorphonuclear leukocytes per low power field    Rare Squamous epithelial cells per low power field    No organisms seen per oil power field  Final Report (03-24-25 @ 06:52):    Commensal lay consistent with body site    Culture - Blood (collected 03-20-25 @ 10:04)  Source: Blood Blood-Peripheral  Preliminary Report (03-24-25 @ 13:01):    No growth at 4 days        Urine Cx: ?  Blood Cx: ?    Imaging

## 2025-03-25 NOTE — PROGRESS NOTE ADULT - SUBJECTIVE AND OBJECTIVE BOX
MICU PROGRESS NOTE     HISTORY OF PRESENT ILLNESS     NAEON. Yesterday L IJV alexis placed at bedside. Got HD session #1 with 1.5 L removed. Got CTB. Had fever 100.2-101.7 during AM noon yesterday, no further fever. HR 90s-110s. SBPs 100-140s. MV V A/C RR 18,  mL, PEEP 6, Was on FiO2 50% in AM until brief desaturation and FiO2 placed back to 90% and adjusted peak pressure.     Seen and examined at bedside without family present, patient remains intubated and sedated with limited meaningful engagement. Appears comfortable and not in pain.     ROS: All negative except as listed above.    VITAL SIGNS:  ICU Vital Signs Last 24 Hrs  T(C): 37.3 (25 Mar 2025 04:00), Max: 38.7 (24 Mar 2025 12:00)  T(F): 99.1 (25 Mar 2025 04:00), Max: 101.7 (24 Mar 2025 12:00)  HR: 91 (25 Mar 2025 07:00) (79 - 115)  BP: --  BP(mean): --  ABP: 120/55 (25 Mar 2025 07:00) (90/44 - 151/74)  ABP(mean): 74 (25 Mar 2025 07:00) (58 - 99)  RR: 18 (25 Mar 2025 07:00) (17 - 23)  SpO2: 95% (25 Mar 2025 07:00) (91% - 100%)    O2 Parameters below as of 25 Mar 2025 07:00  Patient On (Oxygen Delivery Method): ventilator    O2 Concentration (%): 50      Mode: AC/ CMV (Assist Control/ Continuous Mandatory Ventilation), RR (machine): 18, TV (machine): 480, FiO2: 50, PEEP: 6, MAP: 13, PIP: 33  Plateau pressure:   P/F ratio:     03-24 @ 07:01  -  03-25 @ 07:00  --------------------------------------------------------  IN: 2621.5 mL / OUT: 2370 mL / NET: 251.5 mL      CAPILLARY BLOOD GLUCOSE      POCT Blood Glucose.: 180 mg/dL (24 Mar 2025 11:38)      ECG: reviewed.    PHYSICAL EXAM:    General: NAD, normal habitus, mild jaundice   Neuro: AAOx3, on sedation, non responsive to noxious stimuli, closed eyes, no verbal sounds, sluggishly reactive pupils b/l   HEENT: icteric sclerae b/l   Chest: nonTTP   Heart: S1/S2, RRR   Lungs: CTA b/l, on MV   Abd: soft, nonTTP, nondistended   Ext: generalized nonpitting edema, LUE with bullae and desquamation along forearm, nodule on R 1st digit proximal phalanges   Pulses: radial 2+ b/l, dorsalis pedis 2+ b/l   Lines/tubes/drains: triple lumen in L femoral, OG tube, L IJV, daniel   Psych: unable to assess     MEDICATIONS:  MEDICATIONS  (STANDING):  allopurinol 50 milliGRAM(s) Oral <User Schedule>  aztreonam  IVPB 2000 milliGRAM(s) IV Intermittent <User Schedule>  chlorhexidine 0.12% Liquid 15 milliLiter(s) Oral Mucosa every 12 hours  chlorhexidine 2% Cloths 1 Application(s) Topical daily  fentaNYL   Infusion. 0.5 MICROgram(s)/kG/Hr (4.44 mL/Hr) IV Continuous <Continuous>  folic acid 1 milliGRAM(s) Oral daily  influenza   Vaccine 0.5 milliLiter(s) IntraMuscular once  levETIRAcetam 500 milliGRAM(s) Oral two times a day  midodrine 10 milliGRAM(s) Oral every 8 hours  norepinephrine Infusion 1 MICROgram(s)/kG/Min (167 mL/Hr) IV Continuous <Continuous>  petrolatum Ophthalmic Ointment 1 Application(s) Both EYES two times a day  propofol Infusion 25 MICROgram(s)/kG/Min (13.3 mL/Hr) IV Continuous <Continuous>    MEDICATIONS  (PRN):      ALLERGIES:  Allergies    penicillin (Pruritus)  hydroxyurea (Other)  piperacillin-tazobactam (Urticaria)  ceftriaxone (Anaphylaxis)    Intolerances        LABS:                        7.8    19.00 )-----------( 150      ( 25 Mar 2025 00:48 )             21.4     03-25    135  |  96[L]  |  72[H]  ----------------------------<  113[H]  5.0   |  21[L]  |  4.62[H]    Ca    8.7      25 Mar 2025 00:48  Phos  6.1     03-25  Mg     2.30     03-25    TPro  5.6[L]  /  Alb  2.6[L]  /  TBili  15.6[H]  /  DBili  14.5  /  AST  187[H]  /  ALT  124[H]  /  AlkPhos  114  03-25    PT/INR - ( 25 Mar 2025 00:48 )   PT: 12.5 sec;   INR: 1.05 ratio         PTT - ( 25 Mar 2025 00:48 )  PTT:30.9 sec  Urinalysis Basic - ( 25 Mar 2025 00:48 )    Color: x / Appearance: x / SG: x / pH: x  Gluc: 113 mg/dL / Ketone: x  / Bili: x / Urobili: x   Blood: x / Protein: x / Nitrite: x   Leuk Esterase: x / RBC: x / WBC x   Sq Epi: x / Non Sq Epi: x / Bacteria: x      ABG:  pH, Arterial: 7.37 (03-25-25 @ 00:48)  pCO2, Arterial: 42 mmHg (03-25-25 @ 00:48)  pO2, Arterial: 108 mmHg (03-25-25 @ 00:48)      vBG:    Micro:    Culture - Blood (collected 03-20-25 @ 10:04)  Source: Blood Blood-Peripheral  Preliminary Report (03-24-25 @ 13:01):    No growth at 4 days        Culture - Sputum (collected 03-22-25 @ 17:15)  Source: ET Tube ET Tube  Gram Stain (03-22-25 @ 23:42):    Numerous polymorphonuclear leukocytes per low power field    Rare Squamous epithelial cells per low power field    No organisms seen per oil power field  Final Report (03-24-25 @ 06:52):    Commensal lay consistent with body site        RADIOLOGY & ADDITIONAL TESTS: Reviewed.

## 2025-03-25 NOTE — PROGRESS NOTE ADULT - ASSESSMENT
Patient is 44y Male with PMHx of sickle cell disease admitted for anemia concerning for sickle cell crisis s/p ureteroscopy w/ L kidney biopsy 3/19, post-operative course c/b acute hemorrhagic shock, PEA arrest with ROSC; currently with acute renal failure, initiated on HD.     NEURO:  #Sedated  AAOx0. GCS 3.   - C/w propofol, fent, wean as tolerated   - S/p versed gtt, weaned off 3/23  - Repeat CT B 3/24 showing diffuse sulcal effacement with increased intracrnaip pressure, and few patchy foci of cortical blurring     #Seizures  No further seizure-like activity observed.   - Witness myoclonic jerking concerning for seizures iso anoxic brain injury  - s/p 2.5g keppra load  - vEEG report: "Abundant myoclonic seizures in the setting of a severe diffuse/multifocal cerebral dysfunction which can be seen in the setting of sedative effect or due to an anoxic injury, with improvement after 20:50 suggesting a lowering of sedation"; will f/u with neuro recommendations  - c/w keppra 500mg q8h per neuro   - Trend enolase   - repeat vEEG 3/24 showing severe cerebral dysfunction     CV:  #Shock - hemorrhagic shock s/p kidney biopsy s/p 5u pRBC, 3 plasma, 3 plt   Hemodynamically stable.   last dose of lovenox AC on 3/18 at 5PM   Hgb 8 -> ~3   - C/w Levo, MAP goal>65  - Monitor cbc q24  -C/w midodrine 10 mg po TID     #Hx of VTE (R IJ thrombus, L brachial DVT)  - CTM, hold AC given hemorrhage  - Bullae present on arm with DVT,  - Pending plastic surgery consult for non pitting edema LUE     PULM:  #Acute hypoxic respiratory failure  In the setting of PEA arrest   CXR with R lung field and left mid and lower lung field hazy opacities.  - Abx as below  - Wean vent as able  - HTS nebs, duonebs    RENAL:  #Acute renal failure   #Renal mass s/p biopsy  #Acidosis  Likely ATN. Currently w/ anuria. On hemodialysis. Net negatiev.   s/p failed bumex challenge   - Dave  - Trend Cr, uop, lytes  - S/p bicarb drip  - Per nephro, HD session #1 3/24, next tentative 3/25       GI:  #Shock liver  Downtrending LFTs. Jaundice with hyperbilirubinemia (direct).   - Trend liver enzymes  - RUQ US showing enlarged periportal and periaortic enlarged lymph nodes and enlarged left lateral liver lobe and CBD 9 mm.     #Diet:  - NPO w/ tube feed     HEMATOLOGIC:  #Sickle cell crisis  #Acute blood loss enamia  Elevated indirect hyperbilirubinemia, likely from  peripheral hemolysis.   - heme following   -D/c deferasirox due to current renal failure     #DVT prophylaxis - SCD    Endo:  #JUAN    ID:  Afebrile. Unchanged leukocytosis. Bullae and desquamation without active signs of infection in LUE.   - C/w aztreonam 2000 mg IV qd ( started 3/22- 3/26)     MSK:  #infiltration  Infiltration of sodium bicarb into left arm, now with arm distension and bullae. Elevated uric acid with nodule suggegstive of podagra however no signs of active gout flare.   -Pending plastic surgery consult for LUE bullae and desquamation to r/o compartment syndrome   - CT    Ethics: Full Code    Patient is 44y Male with PMHx of sickle cell disease admitted for anemia concerning for sickle cell crisis s/p ureteroscopy w/ L kidney biopsy 3/19, post-operative course c/b acute hemorrhagic shock, PEA arrest with ROSC; currently with acute renal failure, initiated on HD.     NEURO:  #Sedated  AAOx0. GCS 3.   - C/w propofol, fent, wean as tolerated   - S/p versed gtt, weaned off 3/23  - Repeat CT B 3/24 showing diffuse sulcal effacement with increased intracrnail pressure, and few patchy foci of cortical blurring     #Seizures  No further seizure-like activity observed.   - Witness myoclonic jerking concerning for seizures iso anoxic brain injury  - s/p 2.5g keppra load  - vEEG report: "Abundant myoclonic seizures in the setting of a severe diffuse/multifocal cerebral dysfunction which can be seen in the setting of sedative effect or due to an anoxic injury, with improvement after 20:50 suggesting a lowering of sedation"; will f/u with neuro recommendations  -Uptitrated levetiracetam 500 mg po BID to 750 mg po BID   - Trend enolase   - repeat vEEG 3/24 showing severe cerebral dysfunction     CV:  #Shock - hemorrhagic shock s/p kidney biopsy s/p 5u pRBC, 3 plasma, 3 plt   Hemodynamically stable.   last dose of lovenox AC on 3/18 at 5PM   Hgb 8 -> ~3   - C/w Levo, MAP goal>65  - Monitor cbc q24  -UPtitrated midodrine 10 mg to 20 mg po TID     #Hx of VTE (R IJ thrombus, L brachial DVT)  - CTM, hold AC given hemorrhage  - Bullae present on arm with DVT,  - Pending plastic surgery consult for non pitting edema LUE     PULM:  #Acute hypoxic respiratory failure  In the setting of PEA arrest   CXR with R lung field and left mid and lower lung field hazy opacities.  - Abx as below  - Wean vent as able  - HTS nebs, duonebs    RENAL:  #Acute renal failure   #Renal mass s/p biopsy  #Acidosis  Likely ATN. Currently w/ anuria. On hemodialysis. Net negatiev.   s/p failed bumex challenge   - Dave  - Trend Cr, uop, lytes  - S/p bicarb drip  - Per nephro, HD session #1 3/24, next tentative 3/25       GI:  #Shock liver  Downtrending LFTs. Jaundice with hyperbilirubinemia (direct).   - Trend liver enzymes  - RUQ US showing enlarged periportal and periaortic enlarged lymph nodes and enlarged left lateral liver lobe and CBD 9 mm.   -F/u NH3 to r/o hepatic encephalopathy     #Diet:  - NPO w/ tube feed     HEMATOLOGIC:  #Sickle cell crisis  #Acute blood loss enamia  Elevated indirect hyperbilirubinemia, likely from  peripheral hemolysis.   - heme following   -D/c deferasirox due to current renal failure     #DVT prophylaxis - SCD    Endo:  #JUAN    ID:  Afebrile. Unchanged leukocytosis. Bullae and desquamation without active signs of infection in LUE.   - C/w aztreonam 2000 mg IV qd ( started 3/22- 3/26)     MSK:  #infiltration  Infiltration of sodium bicarb into left arm, now with arm distension and bullae. Elevated uric acid with nodule suggegstive of podagra however no signs of active gout flare.   -Pending plastic surgery consult for LUE bullae and desquamation to r/o compartment syndrome   - CT    Ethics: Full Code   -Planning family meeting with neurology

## 2025-03-25 NOTE — PROGRESS NOTE ADULT - SUBJECTIVE AND OBJECTIVE BOX
[facility] Division of Kidney Diseases & Hypertension  FOLLOW UP NOTE  [phone]--------------------------------------------------------------------------------  Chief Complaint: MATA on HD    24 hour events/subjective: Pt seen and examined this morning in MICU. Pt intubated/sedated and on IV vasopressor. Unable to obtain history/ROS.     PAST HISTORY  --------------------------------------------------------------------------------  No significant changes to PMH, PSH, FHx, SHx, unless otherwise noted    ALLERGIES & MEDICATIONS  --------------------------------------------------------------------------------  Allergies    penicillin (Pruritus)  hydroxyurea (Other)  piperacillin-tazobactam (Urticaria)  ceftriaxone (Anaphylaxis)    Intolerances    Standing Inpatient Medications  allopurinol 50 milliGRAM(s) Oral <User Schedule>  aztreonam  IVPB 2000 milliGRAM(s) IV Intermittent <User Schedule>  chlorhexidine 0.12% Liquid 15 milliLiter(s) Oral Mucosa every 12 hours  chlorhexidine 2% Cloths 1 Application(s) Topical daily  fentaNYL   Infusion. 0.5 MICROgram(s)/kG/Hr IV Continuous <Continuous>  folic acid 1 milliGRAM(s) Oral daily  influenza   Vaccine 0.5 milliLiter(s) IntraMuscular once  levETIRAcetam 500 milliGRAM(s) Oral two times a day  midodrine 10 milliGRAM(s) Oral every 8 hours  norepinephrine Infusion 1 MICROgram(s)/kG/Min IV Continuous <Continuous>  petrolatum Ophthalmic Ointment 1 Application(s) Both EYES two times a day  propofol Infusion 25 MICROgram(s)/kG/Min IV Continuous <Continuous>    PRN Inpatient Medications    REVIEW OF SYSTEMS  --------------------------------------------------------------------------------  see above    VITALS/PHYSICAL EXAM  --------------------------------------------------------------------------------  T(C): 37.3 (03-25-25 @ 04:00), Max: 38.7 (03-24-25 @ 12:00)  HR: 91 (03-25-25 @ 08:05) (79 - 115)  BP: --  RR: 18 (03-25-25 @ 07:00) (17 - 23)  SpO2: 93% (03-25-25 @ 08:05) (91% - 100%)  Wt(kg): --    03-24-25 @ 07:01  -  03-25-25 @ 07:00  --------------------------------------------------------  IN: 2621.5 mL / OUT: 2370 mL / NET: 251.5 mL    Physical Exam:  	Gen: Sedated  	HEENT: +ETT  	Pulm: Mechanical breath sounds, FIO2 60%  	CV: S1S2  	Abd: Soft, +BS               : +daniel catheter w/o urine in bag   	Ext: +LE edema B/L  	Neuro: Sedate  	Skin: Warm and dry  	Vascular access: [facility]    LABS/STUDIES  --------------------------------------------------------------------------------              7.8    19.00 >-----------<  150      [03-25-25 @ 00:48]              21.4     135  |  96  |  72  ----------------------------<  113      [03-25-25 @ 00:48]  5.0   |  21  |  4.62        Ca     8.7     [03-25-25 @ 00:48]      iCa    1.05     [03-25 @ 00:48]      Mg     2.30     [03-25-25 @ 00:48]      Phos  6.1     [03-25-25 @ 00:48]    TPro  5.6  /  Alb  2.6  /  TBili  15.6  /  DBili  14.5  /  AST  187  /  ALT  124  /  AlkPhos  114  [03-25-25 @ 00:48]    PT/INR: PT 12.5 , INR 1.05       [03-25-25 @ 00:48]  PTT: 30.9       [03-25-25 @ 00:48]    Uric acid 7.7      [03-25-25 @ 00:48]  LDH 1200      [03-25-25 @ 00:48]    Creatinine Trend:  SCr 4.62 [03-25 @ 00:48]  SCr 6.41 [03-24 @ 16:51]  SCr 5.93 [03-24 @ 08:00]  SCr 5.64 [03-24 @ 00:32]  SCr 5.32 [03-23 @ 13:10]    Lipid: chol --, , HDL --, LDL --      [03-24-25 @ 18:00]

## 2025-03-25 NOTE — PROGRESS NOTE ADULT - ATTENDING COMMENTS
Patient seen and examined at bedside.  Patient's brother Bala and family at bedside.  Urine output improved but remains in oliguric renal failure, currently on dialysis.  Remains on norepinephrine.   Remains sedated, intubated.  MRI head pending.  Continue MICU care.

## 2025-03-25 NOTE — PROGRESS NOTE ADULT - ASSESSMENT
44y M s/p  L ureteroscopy, RGP, biopsy, stent placement L kidney wash, L kidney lower pole mass biopsy on 3/19, RRT and code blue 3/20 now in MICU intubated and sedated on pressors with myoclonic activity and signs of anoxic brain injury on CTH 3/20/25.    3/19: s/p L ureteroscopy, RGP, biopsy, stent placement L kidney wash, L kidney lower pole mass biopsy  3/20: RRT 9a and Code Blue 930a, ROSC achieved in 4 minutes, transferred to MICU intubated and sedated on pressors s/p several pRBC, PLT, and plasma transfusions for Hgb 3.4  3/21: H/H 8.4/22.6, Cr 2.38, 2.85  3/22: off pressors, H/H 8.4/22.9, Cr 3.35, 3.68, lactate 1.5  3/23: H/H 8.2/22.9, back on levo 0.07, Cr 4.9, improved UOP clear yellow    Recommendations:  - Continue daniel catheter, monitor I/O's, draining light pink urine this morning  - Trend H/H (stable), Cr (uptrending), WBC (downtrending), lactate (cleared 3/22)  - Appreciate neuro recommendations, f/u MRI  - Appreciate heme/onc recommendations  - F/u nephrology consult for continued MATA and consideration for HD, patient started on HD overnight  - Appreciate excellent MICU care    Seen and d/w Dr. Rosey [facility] [place] [facility]  [address] [place] [phone]

## 2025-03-26 DIAGNOSIS — G93.1 ANOXIC BRAIN DAMAGE, NOT ELSEWHERE CLASSIFIED: ICD-10-CM

## 2025-03-26 DIAGNOSIS — R53.2 FUNCTIONAL QUADRIPLEGIA: ICD-10-CM

## 2025-03-26 DIAGNOSIS — Z71.89 OTHER SPECIFIED COUNSELING: ICD-10-CM

## 2025-03-26 DIAGNOSIS — Z51.5 ENCOUNTER FOR PALLIATIVE CARE: ICD-10-CM

## 2025-03-26 LAB
ALBUMIN SERPL ELPH-MCNC: 2.6 G/DL — LOW (ref 3.3–5)
ALP SERPL-CCNC: 141 U/L — HIGH (ref 40–120)
ALT FLD-CCNC: 90 U/L — HIGH (ref 4–41)
AMMONIA BLD-MCNC: 49 UMOL/L — SIGNIFICANT CHANGE UP (ref 11–55)
ANION GAP SERPL CALC-SCNC: 16 MMOL/L — HIGH (ref 7–14)
ANISOCYTOSIS BLD QL: SIGNIFICANT CHANGE UP
APTT BLD: 32 SEC — SIGNIFICANT CHANGE UP (ref 24.5–35.6)
AST SERPL-CCNC: 125 U/L — HIGH (ref 4–40)
BASOPHILS # BLD AUTO: 0.17 K/UL — SIGNIFICANT CHANGE UP (ref 0–0.2)
BASOPHILS NFR BLD AUTO: 0.9 % — SIGNIFICANT CHANGE UP (ref 0–2)
BILIRUB DIRECT SERPL-MCNC: 14.1 MG/DL — HIGH (ref 0–0.3)
BILIRUB INDIRECT FLD-MCNC: 1.3 MG/DL — HIGH (ref 0–1)
BILIRUB SERPL-MCNC: 15.2 MG/DL — HIGH (ref 0.2–1.2)
BILIRUB SERPL-MCNC: 15.4 MG/DL — HIGH (ref 0.2–1.2)
BLOOD GAS ARTERIAL COMPREHENSIVE RESULT: SIGNIFICANT CHANGE UP
BLOOD GAS ARTERIAL COMPREHENSIVE RESULT: SIGNIFICANT CHANGE UP
BUN SERPL-MCNC: 53 MG/DL — HIGH (ref 7–23)
BURR CELLS BLD QL SMEAR: PRESENT — SIGNIFICANT CHANGE UP
CA-I BLD-SCNC: 1.09 MMOL/L — LOW (ref 1.15–1.29)
CALCIUM SERPL-MCNC: 8.7 MG/DL — SIGNIFICANT CHANGE UP (ref 8.4–10.5)
CHLORIDE SERPL-SCNC: 93 MMOL/L — LOW (ref 98–107)
CK SERPL-CCNC: 116 U/L — SIGNIFICANT CHANGE UP (ref 30–200)
CO2 SERPL-SCNC: 23 MMOL/L — SIGNIFICANT CHANGE UP (ref 22–31)
CREAT SERPL-MCNC: 3.85 MG/DL — HIGH (ref 0.5–1.3)
DACRYOCYTES BLD QL SMEAR: SIGNIFICANT CHANGE UP
EGFR: 19 ML/MIN/1.73M2 — LOW
EGFR: 19 ML/MIN/1.73M2 — LOW
ELLIPTOCYTES BLD QL SMEAR: SIGNIFICANT CHANGE UP
EOSINOPHIL # BLD AUTO: 0.34 K/UL — SIGNIFICANT CHANGE UP (ref 0–0.5)
EOSINOPHIL NFR BLD AUTO: 1.8 % — SIGNIFICANT CHANGE UP (ref 0–6)
GIANT PLATELETS BLD QL SMEAR: PRESENT — SIGNIFICANT CHANGE UP
GLUCOSE SERPL-MCNC: 110 MG/DL — HIGH (ref 70–99)
GRAM STN FLD: ABNORMAL
HAPTOGLOB SERPL-MCNC: <20 MG/DL — LOW (ref 34–200)
HCT VFR BLD CALC: 21.9 % — LOW (ref 39–50)
HGB BLD-MCNC: 8 G/DL — LOW (ref 13–17)
IANC: 13.63 K/UL — HIGH (ref 1.8–7.4)
INR BLD: 1.06 RATIO — SIGNIFICANT CHANGE UP (ref 0.85–1.16)
LDH SERPL L TO P-CCNC: 1047 U/L — HIGH (ref 135–225)
LYMPHOCYTES # BLD AUTO: 0.7 K/UL — LOW (ref 1–3.3)
LYMPHOCYTES # BLD AUTO: 3.7 % — LOW (ref 13–44)
MACROCYTES BLD QL: SIGNIFICANT CHANGE UP
MAGNESIUM SERPL-MCNC: 2.2 MG/DL — SIGNIFICANT CHANGE UP (ref 1.6–2.6)
MANUAL SMEAR VERIFICATION: SIGNIFICANT CHANGE UP
MCHC RBC-ENTMCNC: 30.3 PG — SIGNIFICANT CHANGE UP (ref 27–34)
MCHC RBC-ENTMCNC: 36.5 G/DL — HIGH (ref 32–36)
MCV RBC AUTO: 83 FL — SIGNIFICANT CHANGE UP (ref 80–100)
MONOCYTES # BLD AUTO: 2.78 K/UL — HIGH (ref 0–0.9)
MONOCYTES NFR BLD AUTO: 14.7 % — HIGH (ref 2–14)
NEUTROPHILS # BLD AUTO: 14.94 K/UL — HIGH (ref 1.8–7.4)
NEUTROPHILS NFR BLD AUTO: 78.9 % — HIGH (ref 43–77)
NRBC # BLD: 2 /100 WBCS — HIGH (ref 0–0)
NRBC BLD-RTO: 2 /100 WBCS — HIGH (ref 0–0)
OVALOCYTES BLD QL SMEAR: SIGNIFICANT CHANGE UP
PHOSPHATE SERPL-MCNC: 5.1 MG/DL — HIGH (ref 2.5–4.5)
PLAT MORPH BLD: NORMAL — SIGNIFICANT CHANGE UP
PLATELET # BLD AUTO: 171 K/UL — SIGNIFICANT CHANGE UP (ref 150–400)
PLATELET COUNT - ESTIMATE: NORMAL — SIGNIFICANT CHANGE UP
POIKILOCYTOSIS BLD QL AUTO: SIGNIFICANT CHANGE UP
POLYCHROMASIA BLD QL SMEAR: SLIGHT — SIGNIFICANT CHANGE UP
POTASSIUM SERPL-MCNC: 4.4 MMOL/L — SIGNIFICANT CHANGE UP (ref 3.5–5.3)
POTASSIUM SERPL-SCNC: 4.4 MMOL/L — SIGNIFICANT CHANGE UP (ref 3.5–5.3)
PROT SERPL-MCNC: 5.8 G/DL — LOW (ref 6–8.3)
PROTHROM AB SERPL-ACNC: 12.3 SEC — SIGNIFICANT CHANGE UP (ref 9.9–13.4)
RBC # BLD: 2.64 M/UL — LOW (ref 4.2–5.8)
RBC # BLD: 2.64 M/UL — LOW (ref 4.2–5.8)
RBC # FLD: 18.6 % — HIGH (ref 10.3–14.5)
RBC BLD AUTO: ABNORMAL
RETICS #: 101.1 K/UL — SIGNIFICANT CHANGE UP (ref 25–125)
RETICS/RBC NFR: 3.8 % — HIGH (ref 0.5–2.5)
SMUDGE CELLS # BLD: PRESENT — SIGNIFICANT CHANGE UP
SODIUM SERPL-SCNC: 132 MMOL/L — LOW (ref 135–145)
SPECIMEN SOURCE: SIGNIFICANT CHANGE UP
TARGETS BLD QL SMEAR: SIGNIFICANT CHANGE UP
WBC # BLD: 18.94 K/UL — HIGH (ref 3.8–10.5)
WBC # FLD AUTO: 18.94 K/UL — HIGH (ref 3.8–10.5)

## 2025-03-26 PROCEDURE — 99222 1ST HOSP IP/OBS MODERATE 55: CPT

## 2025-03-26 PROCEDURE — 99497 ADVNCD CARE PLAN 30 MIN: CPT | Mod: 25

## 2025-03-26 PROCEDURE — 99223 1ST HOSP IP/OBS HIGH 75: CPT | Mod: 25

## 2025-03-26 PROCEDURE — 99233 SBSQ HOSP IP/OBS HIGH 50: CPT

## 2025-03-26 PROCEDURE — 99291 CRITICAL CARE FIRST HOUR: CPT | Mod: GC

## 2025-03-26 PROCEDURE — 99232 SBSQ HOSP IP/OBS MODERATE 35: CPT | Mod: GC

## 2025-03-26 PROCEDURE — 71045 X-RAY EXAM CHEST 1 VIEW: CPT | Mod: 26

## 2025-03-26 RX ORDER — CALCIUM GLUCONATE 20 MG/ML
1 INJECTION, SOLUTION INTRAVENOUS ONCE
Refills: 0 | Status: COMPLETED | OUTPATIENT
Start: 2025-03-26 | End: 2025-03-26

## 2025-03-26 RX ORDER — ACETAMINOPHEN 500 MG/5ML
1000 LIQUID (ML) ORAL ONCE
Refills: 0 | Status: COMPLETED | OUTPATIENT
Start: 2025-03-26 | End: 2025-03-26

## 2025-03-26 RX ORDER — MIDAZOLAM IN 0.9 % SOD.CHLORID 1 MG/ML
2 PLASTIC BAG, INJECTION (ML) INTRAVENOUS EVERY 4 HOURS
Refills: 0 | Status: DISCONTINUED | OUTPATIENT
Start: 2025-03-26 | End: 2025-03-28

## 2025-03-26 RX ADMIN — Medication 400 MILLIGRAM(S): at 23:49

## 2025-03-26 RX ADMIN — Medication 2 MILLIGRAM(S): at 17:05

## 2025-03-26 RX ADMIN — MIDODRINE HYDROCHLORIDE 20 MILLIGRAM(S): 5 TABLET ORAL at 17:03

## 2025-03-26 RX ADMIN — Medication 15 MILLILITER(S): at 05:48

## 2025-03-26 RX ADMIN — LEVETIRACETAM 500 MILLIGRAM(S): 10 INJECTION, SOLUTION INTRAVENOUS at 05:48

## 2025-03-26 RX ADMIN — CALCIUM GLUCONATE 100 GRAM(S): 20 INJECTION, SOLUTION INTRAVENOUS at 03:19

## 2025-03-26 RX ADMIN — LEVETIRACETAM 500 MILLIGRAM(S): 10 INJECTION, SOLUTION INTRAVENOUS at 17:04

## 2025-03-26 RX ADMIN — MIDODRINE HYDROCHLORIDE 20 MILLIGRAM(S): 5 TABLET ORAL at 09:55

## 2025-03-26 RX ADMIN — Medication 1 APPLICATION(S): at 11:49

## 2025-03-26 RX ADMIN — Medication 1 APPLICATION(S): at 17:02

## 2025-03-26 RX ADMIN — Medication 15 MILLILITER(S): at 17:04

## 2025-03-26 RX ADMIN — Medication 1 APPLICATION(S): at 05:48

## 2025-03-26 RX ADMIN — PROPOFOL 13.3 MICROGRAM(S)/KG/MIN: 10 INJECTION, EMULSION INTRAVENOUS at 07:14

## 2025-03-26 RX ADMIN — FOLIC ACID 1 MILLIGRAM(S): 1 TABLET ORAL at 11:42

## 2025-03-26 RX ADMIN — MIDODRINE HYDROCHLORIDE 20 MILLIGRAM(S): 5 TABLET ORAL at 01:17

## 2025-03-26 RX ADMIN — AZTREONAM 200 MILLIGRAM(S): 2 INJECTION, POWDER, LYOPHILIZED, FOR SOLUTION INTRAMUSCULAR; INTRAVENOUS at 11:42

## 2025-03-26 NOTE — PROGRESS NOTE ADULT - ASSESSMENT
44y M s/p  L ureteroscopy, RGP, biopsy, stent placement L kidney wash, L kidney lower pole mass biopsy on 3/19, RRT and code blue 3/20 now in MICU intubated and sedated on pressors with myoclonic activity and signs of anoxic brain injury on CTH 3/20/25.    3/19: s/p L ureteroscopy, RGP, biopsy, stent placement L kidney wash, L kidney lower pole mass biopsy  3/20: RRT 9a and Code Blue 930a, ROSC achieved in 4 minutes, transferred to MICU intubated and sedated on pressors s/p several pRBC, PLT, and plasma transfusions for Hgb 3.4  3/21: H/H 8.4/22.6, Cr 2.38, 2.85  3/22: off pressors, H/H 8.4/22.9, Cr 3.35, 3.68, lactate 1.5  3/23: H/H 8.2/22.9, back on levo 0.07, Cr 4.9, improved UOP clear yellow    Recommendations:  - Appreciate neuro recommendations, MRI consistent with global anoxic brain injury, prognosis for recovery of neurologic function is guarded, ongoing discussion with family regarding GOC. Appreciate palliative care consultation and recommendations.   - Appreciate heme/onc recommendations  - Appreciate excellent MICU care    Seen and d/w Dr. Rosey Carolina [place] [facility]  [address] [place] [phone]

## 2025-03-26 NOTE — GOALS OF CARE CONVERSATION - ADVANCED CARE PLANNING - CONVERSATION DETAILS
Referral to palliative care for complex decision making and symptom management in setting of complex illness c/b PEA arrest on 3/20 s/p elective Kidney biopsy. Introduced [facility] team to family and explained our role in patient's care. Family amenable to our participation in patient's care. Patient lives in a multifamily home with his brother, Alex, Sister, Radha, and cousin, Donal. Patient is single, never , no children. Both of his parents  during Covid in May 2020. Per [facility] patient did not have a health care proxy and per [facility], patient's siblings are his surrogate decision makers. Patient was not working 2/2  recurrent hospitalizations related to his Sickle Cell disease. He was supported by his family.     Reviewed patient's medical and social history, as well as the events leading to current MICU stay. Family shared that they already met with the ICU team as well as Neurology team and received the information indicating that unfortunately Alex sustained an anoxic brain injury and per Neurology has a guarded prognosis and likely to remain in a vegetative state. The family is still processing this information and "in a state of shock" as patient had a "routine biopsy to see if he had cancer and was hesitant to even doing the biopsy in the first place but eventually was amenable to the biopsy". The brother stated that "this was not his first rodeo with family members on life support, sharing that both of his parents and a cousin  on ventilators". That being said, he and the family are NOT ready to make any decisions to discuss continuation of life support vs. compassionate extubation. Reassured him that no decisions are required at this time but the goal of our encounter is to ensure that the family has the information to help guide their next decisions. They are sharing that patient would NOT want to live a life on long term vent support. We did encourage them to have discussions about not re-introducing further CPR in the event of another cardiac arrest, which would translate as a DNR. The family acknowledges the importance of a DNR and will discuss amongst themselves and update the medical team if they make any decisions.     Despite the family's understanding of the gravity of patient's poor clinical status, they still maintain hope and optimism for patient's recovery and improvement. They share they are [employer]ian[employer] and would be open to Sabianism chaplaincy referral.

## 2025-03-26 NOTE — PROGRESS NOTE ADULT - SUBJECTIVE AND OBJECTIVE BOX
Neurology Progress Note    SUBJECTIVE/OBJECTIVE/INTERVAL EVENTS: Patient seen and examined at bedside w/ neuro attending and team.     INTERVAL HISTORY: No acute events overnight. MRI brain was done. Family meeting held yesterday 3/25.     REVIEW OF SYSTEMS: Unable to obtain as pt is intubated and sedated. Continues on propofol and fentanyl for sedation.     VITALS & EXAMINATION:  Vital Signs Last 24 Hrs  T(C): 37.2 (23 Mar 2025 08:00), Max: 38.1 (22 Mar 2025 12:00)  T(F): 99 (23 Mar 2025 08:00), Max: 100.6 (22 Mar 2025 12:00)  HR: 83 (23 Mar 2025 09:00) (83 - 106)  BP: --  BP(mean): --  RR: 20 (23 Mar 2025 09:00) (14 - 24)  SpO2: 98% (23 Mar 2025 09:00) (92% - 100%)    Parameters below as of 23 Mar 2025 08:00  Patient On (Oxygen Delivery Method): ventilator    O2 Concentration (%): 40    Altered mental status - On sedation with Propofol and Fentanyl -Not Following commands   Does not track, does not attend to examiner  - on propofol,  on MV  Cranial reflexes- Pupils B/L equal 2mm sluggishly reacting to light, corneal reflex absent bilaterally, gag reflex absent b/l  Motor and sensory- no response to pain, no purposeful movements noted  -Tone - flaccid, decreased throughout   - DTRs not tested        LABORATORY:  CBC                       8.2    14.83 )-----------( 162      ( 23 Mar 2025 01:05 )             22.9     Chem 03-23    133[L]  |  93[L]  |  93[H]  ----------------------------<  133[H]  4.9   |  20[L]  |  4.94[H]    Ca    9.0      23 Mar 2025 05:02  Phos  5.0     03-23  Mg     2.40     03-23    TPro  x   /  Alb  x   /  TBili  x   /  DBili  13.2[H]  /  AST  x   /  ALT  x   /  AlkPhos  x   03-22    LFTs LIVER FUNCTIONS - ( 22 Mar 2025 00:11 )  Alb: 2.7 g/dL / Pro: 5.0 g/dL / ALK PHOS: 96 U/L / ALT: 288 U/L / AST: 553 U/L / GGT: x           Coagulopathy PT/INR - ( 23 Mar 2025 01:05 )   PT: 13.0 sec;   INR: 1.12 ratio         PTT - ( 23 Mar 2025 01:05 )  PTT:29.9 sec  Lipid Panel   A1c   Cardiac enzymes     U/A Urinalysis Basic - ( 23 Mar 2025 05:02 )    Color: x / Appearance: x / SG: x / pH: x  Gluc: 133 mg/dL / Ketone: x  / Bili: x / Urobili: x   Blood: x / Protein: x / Nitrite: x   Leuk Esterase: x / RBC: x / WBC x   Sq Epi: x / Non Sq Epi: x / Bacteria: x      CSF  Immunological  Other    STUDIES & IMAGING: (EEG, CT, MR, U/S, TTE/PA):      EEG  Study Date: 03-22-25 08:00 - 08:00 03-23-25  Duration x Hours: 24 hrs  Clinical Impression:    1. GPDs can be seen in the setting of toxic/metabolic etiologies and can be associated with seizures at frequencies > 2.5 Hz  2. Risk of focal onset seizures in the left posterior temporal region  3. Severe diffuse/multifocal cerebral dysfunction, likely in part due to sedative effect    MRI brain: 3/25    IMPRESSION: Global hypoxic ischemic injury.

## 2025-03-26 NOTE — CHART NOTE - NSCHARTNOTEFT_GEN_A_CORE
Palliative Care Biopsychosocial Assessment:     Discussed patient in morning Interdisciplinary rounds   Patient identified for need of assessment by Palliative Care . Referral to palliative care for complex decision making and symptom management in setting of complex illness c/b PEA arrest on 3/20 s/p elective Kidney biopsy. Introduced [facility] team to family and explained our role in patient's care. Family amenable to our participation in patient's care. Reviewed patient's medical and social history, as well as the events leading to current MICU stay. Family shared that they already met with the ICU team as well as Neurology team and received the information indicating that unfortunately Alex sustained an anoxic brain injury and per Neurology has a guarded prognosis and likely to remain in a vegetative state. Family are newly processing information. They are appreciative of palliative care support however need more time to digest information and discuss next step options amongst themselves. Pt's brother Bala shared, "This is not my first rodeo I had 3 family members (parents and cousin) pass away on life support.   See Goals of Care Document 3/26/2025    Patient Mental Status:   [   ]   alert     [   ]  oriented   [    ]  confused   [   ] lethargic  [ X  ] non-responsive        Patient Coping Status:     [   ] coping well    [   ] coping with  some difficulty    [   ] difficulty coping   [  X ] Unable to assess                                                Patient Emotional Status:   [   ] anxious   [   ] depressed    [   ] overwhelmed    [   ] angry   [   ]accepting    [   ] not accepting   [ X  ] Unable to assess        Advance Directives:   [ X  ]  Health Care Surrogate:  Bala Downey; brother () and Radha Downey; sister  ([phone])                    [   ]    Health Care Proxy:  [   ]    MOLST  [   ]    Living Will  [   ]    DNR  [   ]    DNI    Social Support:  Evaluated patients social support system. Pt has intact social supports. He has never been  and has no significant other or children. His parents both passed away in May of 2020 secondary to Covid infections. He resides in a multifamily house with his two siblings, Bala and Radha and his cousin, Donal White (cousin) [phone]. His cousin lives in apt with him and has been a source of support to pt.     Patient Needs:  [   ] Supportive Counseling      [   ] Family Meeting     [   ] Education     [  X ] Advance Care Planning         Caregiver needs:   [  X ]  Supportive Counseling    [ X  ] Family Conference     [   ] Education      Referral:    [   ]  Community Resources     [    ]   Cancer Support Group     [   ]  Hospice     [   ]  Bereavement support     [ X  ] Pastoral Care      [   ]  [facility] [place]    [   ]  Child Life Services   [ X ] Caregiver Support Group  [  ] Supportive Oncology  [   ] Behavioral Health   [   ]  No needs identified no referrals made      Palliative Care  will continue to remain available as needed for continued psychosocial and emotional support as well as goals of care.  Empathic and active listening provided.     Follow-Up:  [ X  ] Bi-Weekly       Genesis Rodriguez LMSW  Palliative Care   Geriatrics and Palliative Care Division at [facility]  Pager #[phone]  Extension [phone]

## 2025-03-26 NOTE — CONSULT NOTE ADULT - ASSESSMENT
Patient is 44y Male with PMHx of sickle cell disease admitted for anemia concerning for sickle cell crisis s/p ureteroscopy w/ L kidney biopsy 3/19, post-operative course c/b acute hemorrhagic shock, PEA arrest with ROSC; currently with acute renal failure on HD and ongoing goals of care.     Palliative consulted for complex decision making regarding goc in setting of complex illness c/b cardiac arrest with subsequent anoxic brain injury.

## 2025-03-26 NOTE — PROGRESS NOTE ADULT - SUBJECTIVE AND OBJECTIVE BOX
[facility] Division of Kidney Diseases & Hypertension  FOLLOW UP NOTE  [phone]--------------------------------------------------------------------------------  Chief Complaint: MATA on HD    24 hour events/subjective: Pt seen and examined this morning in MICU. Pt intubated/sedated and on IV vasopressor. Unable to obtain history/ROS.     PAST HISTORY  --------------------------------------------------------------------------------  No significant changes to PMH, PSH, FHx, SHx, unless otherwise noted    ALLERGIES & MEDICATIONS  --------------------------------------------------------------------------------  Allergies    penicillin (Pruritus)  hydroxyurea (Other)  piperacillin-tazobactam (Urticaria)  ceftriaxone (Anaphylaxis)    Intolerances    Standing Inpatient Medications  allopurinol 50 milliGRAM(s) Oral <User Schedule>  aztreonam  IVPB 2000 milliGRAM(s) IV Intermittent <User Schedule>  chlorhexidine 0.12% Liquid 15 milliLiter(s) Oral Mucosa every 12 hours  chlorhexidine 2% Cloths 1 Application(s) Topical daily  fentaNYL   Infusion. 0.5 MICROgram(s)/kG/Hr IV Continuous <Continuous>  folic acid 1 milliGRAM(s) Oral daily  influenza   Vaccine 0.5 milliLiter(s) IntraMuscular once  levETIRAcetam 500 milliGRAM(s) Oral two times a day  midodrine 20 milliGRAM(s) Oral every 8 hours  norepinephrine Infusion 1 MICROgram(s)/kG/Min IV Continuous <Continuous>  petrolatum Ophthalmic Ointment 1 Application(s) Both EYES two times a day  propofol Infusion 25 MICROgram(s)/kG/Min IV Continuous <Continuous>    PRN Inpatient Medications    REVIEW OF SYSTEMS  --------------------------------------------------------------------------------  see above    VITALS/PHYSICAL EXAM  --------------------------------------------------------------------------------  T(C): 37.5 (03-26-25 @ 04:00), Max: 37.8 (03-25-25 @ 16:00)  HR: 78 (03-26-25 @ 07:24) (75 - 94)  BP: --  RR: 20 (03-26-25 @ 07:00) (18 - 24)  SpO2: 97% (03-26-25 @ 07:24) (91% - 100%)  Wt(kg): --    03-25-25 @ 07:01  -  03-26-25 @ 07:00  --------------------------------------------------------  IN: 1513 mL / OUT: 2170 mL / NET: -657 mL    Physical Exam:  	Gen: Sedated  	HEENT: +ETT  	Pulm: Mechanical breath sounds, FIO2 90%  	CV: S1S2  	Abd: Soft, +BS               : +daniel catheter w/ minimal urine in bag   	Ext: +LE edema B/L  	Neuro: Sedate  	Skin: Warm and dry  	Vascular access: [facility]    LABS/STUDIES  --------------------------------------------------------------------------------              8.0    18.94 >-----------<  171      [03-26-25 @ 01:25]              21.9     132  |  93  |  53  ----------------------------<  110      [03-26-25 @ 01:25]  4.4   |  23  |  3.85        Ca     8.7     [03-26-25 @ 01:25]      iCa    1.09     [03-26 @ 01:25]      Mg     2.20     [03-26-25 @ 01:25]      Phos  5.1     [03-26-25 @ 01:25]    TPro  5.8  /  Alb  2.6  /  TBili  15.2  /  DBili  14.1  /  AST  125  /  ALT  90  /  AlkPhos  141  [03-26-25 @ 01:25]    PT/INR: PT 12.3 , INR 1.06       [03-26-25 @ 01:25]  PTT: 32.0       [03-26-25 @ 01:25]    Uric acid 7.7      [03-25-25 @ 00:48]  LDH 1047      [03-26-25 @ 01:25]    Creatinine Trend:  SCr 3.85 [03-26 @ 01:25]  SCr 4.62 [03-25 @ 00:48]  SCr 6.41 [03-24 @ 16:51]  SCr 5.93 [03-24 @ 08:00]  SCr 5.64 [03-24 @ 00:32]    Lipid: chol --, , HDL --, LDL --      [03-24-25 @ 18:00]

## 2025-03-26 NOTE — PROGRESS NOTE ADULT - ATTENDING COMMENTS
44 year old male with a history of SSD, UE DVT, gout, HTN, with L renal mass admitted with acute anemia/ sickle cell crisis and evaluation of his left renal mass s/p left ureteroscopy and biopsy (3/19/25) with 24 hour post operative course complicated by rapid responses for hypoglycemia c/b cardiac arrest x 4 minutes CPR/1 epi with ROSC, transferred to MICU for further care     Post rosc found to be profoundly anemic, acidemic, with multiorgan dysfunction overall concerning for acute hemorrhagic shock.   Now with concerns for anoxia. CTH with loss of grey white differentiation. Repeat CT again with diffuse loss of grey white differentiation.    MRI showing anoxia. 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     # Cardiac arrest  # Anoxic encephalopathy  # SZ vs myoclonus  # Shock, on pressors  # Anemia  #SSD  # Transaminitis  # MATA 2/2 to ATN  # DVT  # Hyperbilirubinemia, Early cirrhosis?  # Aspiraiton pneumonia  # Urothelial Cancer.   - Cardiac arrest, short down time but with likely anoxic injury and multiorgan dysfunction in setting of hemorrage, time of arrest Hg was 3 and hypoglycemia  - Repeat CT and MRI  diffuse anoxia. NSE pending. Neuro following for possible sz vs myoclonus.  - Wean sedation as tolerated to better assess mental status  - Shock, likely vasoplegia, wean as tolerated. added midodrine  - acute hypoxemic respiratory failure 2/2 to aspiration pneumonia, mucus plugging. check CXR, c/w airway clearance.   - Transfuse PRN, now s/p embolization by IR  - SSD, monitor hemolysis labs. SS labs  - Shocked liver, but with LFT elevation even prior. Mostly direct bilirubin,, Early cirrhosis on US. Trend bili  - MATA. s/p HD. HD per renal.  - DVT no A/C 2/2 to bleeding  - on Aztrenonam and flagyl, d/c flagyl for now. F/U with cultures Will treat for 5 days.  - Hand lesions. Initially consulted vascular now vascular recommending plastics/hand.   - Biopsy showing Non-invasive papillary urothelial carcinoma. Further management based on going GOC.   - DVT ppx- SCD  - Dispo- full code. D/W family findings of the MRI, pathology findings, clinical status for renal failure and acute hypoxemic respiratory failure. Ongoing GOC pending. Palliative care consult placed. Prognosis is grim.

## 2025-03-26 NOTE — PROGRESS NOTE ADULT - ASSESSMENT
Patient is 44y Male with PMHx of sickle cell disease admitted for anemia concerning for sickle cell crisis s/p ureteroscopy w/ L kidney biopsy 3/19, post-operative course c/b acute hemorrhagic shock, PEA arrest with ROSC; currently with acute renal failure on HD and ongoing goals of care.     NEURO:  #Sedated  AAOx0. GCS 3.   - C/w propofol wean as tolerated   - D/c fenatnyl   - S/p versed gtt, weaned off 3/23  - Repeat CT B 3/24 showing diffuse sulcal effacement with increased intracrnail pressure, and few patchy foci of cortical blurring   - 3/25 MRIB with diffuse global abnormal supratentorial cortical restricted diffusion consistent with global hypoxic injury     #Seizures  No further seizure-like activity observed.   - Witness myoclonic jerking concerning for seizures iso anoxic brain injury  - s/p 2.5g keppra load  - vEEG report: "Abundant myoclonic seizures in the setting of a severe diffuse/multifocal cerebral dysfunction which can be seen in the setting of sedative effect or due to an anoxic injury, with improvement after 20:50 suggesting a lowering of sedation"; will f/u with neuro recommendations  -Uptitrated levetiracetam 500 mg po BID to 750 mg po BID   - Trend enolase   - repeat vEEG 3/24 showing severe cerebral dysfunction     CV:  #Shock - hemorrhagic shock s/p kidney biopsy s/p 5u pRBC, 3 plasma, 3 plt   Hemodynamically stable.   last dose of lovenox AC on 3/18 at 5PM   Hgb 8 -> ~3   - C/w Levo, MAP goal>65  - Monitor cbc q24  -C/w midodrine  20 mg po TID     #Hx of VTE (R IJ thrombus, L brachial DVT)  - CTM, hold AC given hemorrhage  - Bullae present on arm with DVT,  - Pending plastic surgery consult for non pitting edema LUE     PULM:  #Acute hypoxic respiratory failure  In the setting of PEA arrest   CXR with R lung field and left mid and lower lung field hazy opacities.  - Abx as below  - Wean vent as able  - HTS nebs, duonebs    RENAL:  #Acute renal failure   #Renal mass s/p biopsy  #Acidosis   Currently w/ anuria. On hemodialysis. Remains hypervolemic.    s/p failed bumex challenge   - Dave  - Trend Cr, uop, lytes  - S/p bicarb drip  - Per nephro, HD session #1 3/24, HD sessions #2 3/25       GI:  #Shock liver  Downtrending LFTs. Jaundice with hyperbilirubinemia (direct).   - Trend liver enzymes  - RUQ US showing enlarged periportal and periaortic enlarged lymph nodes and enlarged left lateral liver lobe and CBD 9 mm.   -F/u NH3 to r/o hepatic encephalopathy     #Diet:  - NPO w/ tube feed     HEMATOLOGIC:  #Sickle cell crisis  #Acute blood loss enamia  Elevated indirect hyperbilirubinemia, likely from  peripheral hemolysis.   - heme following   -D/c deferasirox due to current renal failure     #DVT prophylaxis - SCD    Endo:  #JUAN    ID:  Afebrile. Unchanged leukocytosis. Bullae and desquamation without active signs of infection in LUE.   - C/w aztreonam 2000 mg IV qd ( started 3/22- 3/26)     MSK:  #infiltration  Infiltration of sodium bicarb into left arm, now with arm distension and bullae. Elevated uric acid with nodule suggegstive of podagra however no signs of active gout flare.   -Pending plastic surgery consult for LUE bullae and desquamation to r/o compartment syndrome   - CT    Ethics: Full Code   -Had family meeting with neurology, MICU team; ongoing goals of care conversation  Patient is 44y Male with PMHx of sickle cell disease admitted for anemia concerning for sickle cell crisis s/p ureteroscopy w/ L kidney biopsy 3/19, post-operative course c/b acute hemorrhagic shock, PEA arrest with ROSC; currently with acute renal failure on HD and ongoing goals of care.     NEURO:  #Sedated  AAOx0. GCS 3.   - D/c propofol   - D/c fenatnyl   - S/p versed gtt, weaned off 3/23  - Repeat CT B 3/24 showing diffuse sulcal effacement with increased intracrnail pressure, and few patchy foci of cortical blurring   - 3/25 MRIB with diffuse global abnormal supratentorial cortical restricted diffusion consistent with global hypoxic injury   - F/u palliative consult for goals of care     #Seizures  No further seizure-like activity observed.   - Witness myoclonic jerking concerning for seizures iso anoxic brain injury  - s/p 2.5g keppra load  - vEEG report: "Abundant myoclonic seizures in the setting of a severe diffuse/multifocal cerebral dysfunction which can be seen in the setting of sedative effect or due to an anoxic injury, with improvement after 20:50 suggesting a lowering of sedation"; will f/u with neuro recommendations  -Uptitrated levetiracetam 500 mg po BID to 750 mg po BID   - Trend enolase   - repeat vEEG 3/24 showing severe cerebral dysfunction     CV:  #Shock - hemorrhagic shock s/p kidney biopsy s/p 5u pRBC, 3 plasma, 3 plt   Hemodynamically stable.   last dose of lovenox AC on 3/18 at 5PM   Hgb 8 -> ~3   - C/w Levo, MAP goal>65  - Monitor cbc q24  -C/w midodrine  20 mg po TID     #Hx of VTE (R IJ thrombus, L brachial DVT)  - CTM, hold AC given hemorrhage  - Bullae present on arm with DVT,  - Pending plastic surgery consult for non pitting edema LUE     PULM:  #Acute hypoxic respiratory failure  In the setting of PEA arrest   CXR with R lung field and left mid and lower lung field hazy opacities.  - Abx as below  - Wean vent as able  - HTS nebs, duonebs  - F/u CXR due to intermittent desaturation and discordant PaO2  and SpO2 to r/o pulmonary edema     RENAL:  #Acute renal failure   #Renal mass s/p biopsy  #Acidosis   Currently w/ anuria. On hemodialysis. Remains hypervolemic.    s/p failed bumex challenge   - Dave  - Trend Cr, uop, lytes  - S/p bicarb drip  - Per nephro, HD session #1 3/24, HD sessions #2 3/25       GI:  #Shock liver  Downtrending LFTs. Jaundice with hyperbilirubinemia (direct).   - Trend liver enzymes  - RUQ US showing enlarged periportal and periaortic enlarged lymph nodes and enlarged left lateral liver lobe and CBD 9 mm.   -F/u NH3 to r/o hepatic encephalopathy     #Diet:  - NPO w/ tube feed     HEMATOLOGIC:  #Sickle cell crisis  #Acute blood loss enamia  Elevated indirect hyperbilirubinemia, likely from  peripheral hemolysis.   - heme following   -D/c deferasirox due to current renal failure     #DVT prophylaxis - SCD    Endo:  #JUAN    ID:  Afebrile. Unchanged leukocytosis. Bullae and desquamation without active signs of infection in LUE.   - C/w aztreonam 2000 mg IV qd ( started 3/22- 3/26)     MSK:  #infiltration  Infiltration of sodium bicarb into left arm, now with arm distension and bullae. Elevated uric acid with nodule suggegstive of podagra however no signs of active gout flare.   -Pending plastic surgery consult for LUE bullae and desquamation to r/o compartment syndrome   - CT    Ethics: Full Code   -Had family meeting with neurology, MICU team; ongoing goals of care conversation

## 2025-03-26 NOTE — CONSULT NOTE ADULT - SUBJECTIVE AND OBJECTIVE BOX
Orthopaedic Surgery Consult Note    Attending Physician:  Consult requested by:     CC:     HPI:  45yo male with PMHx of sickle cell disease admitted for anemia concerning for sickle cell crisis s/p kidney biopsy 3/19 c/b hemorrhagic shock, PEA w/ arrest with ROSC, now in MICU intubated and in ARF on HD. Ortho consulted for r/o compartment syndrome LUE. Patient have sodium bicarb IV infiltration in LUE 6 days ago. Vascular surgery consulted at that time for r/o compartment syndrome, last evaluated on 3/24. Per MICU, plastics consulted on 3/24 for r/o LUE compartment syndrome due to development of bullae and desquamation. Bullae have since ruptured. Patient is globally edematous 2/2 ARF. Per MICU, no changes in edema or compartment exam since Monday.       Allergies    penicillin (Pruritus)  hydroxyurea (Other)  piperacillin-tazobactam (Urticaria)  ceftriaxone (Anaphylaxis)    Intolerances      PAST MEDICAL & SURGICAL HISTORY:  Sickle cell anemia      Gout      Deep vein thrombosis (DVT)      Iron overload      Hypertension      History of cholecystectomy      History of removal of Port-a-Cath          Meds:  allopurinol 50 milliGRAM(s) Oral <User Schedule>  aztreonam  IVPB 2000 milliGRAM(s) IV Intermittent <User Schedule>  chlorhexidine 0.12% Liquid 15 milliLiter(s) Oral Mucosa every 12 hours  chlorhexidine 2% Cloths 1 Application(s) Topical daily  folic acid 1 milliGRAM(s) Oral daily  influenza   Vaccine 0.5 milliLiter(s) IntraMuscular once  levETIRAcetam 500 milliGRAM(s) Oral two times a day  midodrine 20 milliGRAM(s) Oral every 8 hours  norepinephrine Infusion 1 MICROgram(s)/kG/Min IV Continuous <Continuous>  petrolatum Ophthalmic Ointment 1 Application(s) Both EYES two times a day      Family History:  Denies family history of bleeding disorders    Social History:   Pt is a nonsmoker  Social EtOH use     Review of Systems:  All review of systems are negative except for those mentioned in HPI.    Physical Exam:  Patient intubated  LUE:  Numerous ruptured bullae over anterior and medial forearm  Compartments soft and compressible throughout forearm and hand   +Rad pulse, WWP   RUE:  Few ruptured bullae over anterior and medial forearm  Compartments soft and compressible throughout forearm and hand   +Rad pulse, WWP     Vital Signs Last 24 Hrs  T(C): 37.8 (26 Mar 2025 12:00), Max: 37.8 (25 Mar 2025 16:00)  T(F): 100 (26 Mar 2025 12:00), Max: 100 (25 Mar 2025 16:00)  HR: 81 (26 Mar 2025 15:15) (75 - 94)  BP: --  BP(mean): --  RR: 22 (26 Mar 2025 15:15) (18 - 25)  SpO2: 97% (26 Mar 2025 15:15) (91% - 100%)    Parameters below as of 26 Mar 2025 15:00  Patient On (Oxygen Delivery Method): ventilator    O2 Concentration (%): 80      Labs:                        8.0    18.94 )-----------( 171      ( 26 Mar 2025 01:25 )             21.9     03-26    132[L]  |  93[L]  |  53[H]  ----------------------------<  110[H]  4.4   |  23  |  3.85[H]    Ca    8.7      26 Mar 2025 01:25  Phos  5.1     03-26  Mg     2.20     03-26    TPro  5.8[L]  /  Alb  2.6[L]  /  TBili  15.2[H]  /  DBili  14.1[H]  /  AST  125[H]  /  ALT  90[H]  /  AlkPhos  141[H]  03-26    PT/INR - ( 26 Mar 2025 01:25 )   PT: 12.3 sec;   INR: 1.06 ratio         PTT - ( 26 Mar 2025 01:25 )  PTT:32.0 sec    Imaging:     A/P: 44yMale w/ sickle cell admitted for renal biopsy c/b PEA w ROSC, now intubated in MICU, in ARF in HD and in persistent vegetative state. Ortho consulted for r/o compartment syndrome.   - No concern for compartment syndrome  - Recommend local wound care for ruptured bullae, ace wrap BLUE prn  - no acute ortho surgery indicated   - please reconsult for any new concerns     For all questions related to patient care, please reach out via the on-call pager.*     Lisha Erickson PGY2  Orthopedic Surgery  [facility]: [phone]  [facility]: [phone]  [facility] [place] [phone]

## 2025-03-26 NOTE — CONSULT NOTE ADULT - PROBLEM SELECTOR RECOMMENDATION 6
In the event of newly developing, evolving, or worsening symptoms, please contact the Palliative Medicine team via pager (if the patient is at [facility] #[phone] or if the patient is at [facility] #[phone]) The Geriatric and Palliative Medicine service has coverage 24 hours a day/ 7 days a week to provide medical recommendations regarding symptom management needs via telephone.

## 2025-03-26 NOTE — PROGRESS NOTE ADULT - SUBJECTIVE AND OBJECTIVE BOX
Objective    Vital signs  T(F): , Max: 100 (03-26-25 @ 12:00)  HR: 81 (03-26-25 @ 15:15)  BP: --  SpO2: 97% (03-26-25 @ 15:15)  Wt(kg): --    Output     OUT:    Indwelling Catheter - Urethral (mL): 270 mL  Total OUT: 270 mL    Total NET: -270 mL      OUT:    Indwelling Catheter - Urethral (mL): 10 mL  Total OUT: 10 mL    Total NET: -10 mL          Gen: NAD  Abd: soft, nontender, nondistended  : daniel secured in place, draining light pink urine    Labs      03-26 @ 01:25    WBC 18.94 / Hct 21.9  / SCr 3.85     03-25 @ 00:48    WBC 19.00 / Hct 21.4  / SCr 4.62         Culture - Sputum (collected 03-22-25 @ 17:15)  Source: ET Tube ET Tube  Gram Stain (03-22-25 @ 23:42):    Numerous polymorphonuclear leukocytes per low power field    Rare Squamous epithelial cells per low power field    No organisms seen per oil power field  Final Report (03-24-25 @ 06:52):    Commensal lay consistent with body site    Culture - Blood (collected 03-20-25 @ 10:04)  Source: Blood Blood-Peripheral  Final Report (03-25-25 @ 13:01):    No growth at 5 days        Urine Cx: ?  Blood Cx: ?    Imaging

## 2025-03-26 NOTE — CONSULT NOTE ADULT - PROBLEM SELECTOR RECOMMENDATION 9
Pt s/p PEA arrest on 3/20. Pt currently intubated.   > MR brain concerning for hypoxic ischemic injury prognosis is guarded at this time and current scan is suggestive of poor neurological recovery and prolonged vegetative state.   > Appreciate Neurology recs- trend enolase, s/p VEEG showed severe cerebral dysfunction   > Patient on keppra 500mg tid   > Patient is being weaned off sedation   > Wean pressors at tolerated   > Appreciate MICU care

## 2025-03-26 NOTE — PROGRESS NOTE ADULT - ASSESSMENT
44year old man PMHX sickle cell prior acute chest syndrome, iron overload, Gout, HTN, RUE DVT (was on AC), renal lesion  -admitted 3/15/25 sickle cell crisis, received pRBCs and  s/p L ureteroscopy w/ biopsy and stent placement on 3/19.  -RRT 3/20 9am for ams/hypoglycemia, code blue 3/20 ~930am for PEA arrest attributed to hypoglycemia. Time to rosc from start of compressions 4 minutes but unknown total down time   -3/20 ~1400 Developed myoclonic movements of head, abdomen, LLE starting 3/20 ~1400, increased in frequency overnight into 3/21. Also in MICU hemorrhage shock due to RP bleed s/p embo with IR        Impression: -PEA arrest in the setting of hypoglycemia leading to anoxic brain injury and post anoxic myoclonic status epilepticus involving jaw and occasionally impeding vent flow  -Prognosis for meaningful neurological recovery is guarded. Time to ROSC from start of CPR is only 4 minutes but total downtime unknown (<30mins) is overall indeterminant. anoxic injury on CT and early post arrest myoclonus are unfavorable. MRI brain concerning for hypoxic ischemic injury.     Recommendations:  -MR brain concerning for hypoxic ischemic injury prognosis is guarded at this time and current scan is suggestive of poor neurological recovery and prolonged vegetative state.   -Check Neuron Specific Enolase (NSE) on 24,48, 72 hours   -Please obtain first NSE 24-48 hrs post-cardiac arrest (Between 930am 3/21 and 930am 3/22)  -Please obtain second NSE 24 hours after drawing first NSE     Management:  -GOC per primary team  -continue keppra 500 mg TID (dose reduced due to decreased kidney function)  -C/W Sedation as per primary team, recommend weaning      Case d/w and seen with neurology attending Dr Bender

## 2025-03-26 NOTE — CONSULT NOTE ADULT - TIME BILLING
Pt is purchasing nexium over the counter. He would like to cancel Rx and discuss when she comes for f/u. Pharmacist notified.  Please  update med list.    Time spent for extensive review of the physical chart, electronic medical record, and documentation to obtain collateral information including but not limited to:  [x ] Inpatient records (ED, H&P, primary team, and consultants if applicable, care coordination)  [x ] Inpatient values/results (biomarkers, immunoassays, imaging, and microbiology results)  [x ] Current or proposed treatment plans  [x  ] Discussion with the primary team  [x ] Discussion with the patient, surrogate decision maker, or family  [x] 30 mins spent discussing goc     Time spent: >75 min

## 2025-03-26 NOTE — GOALS OF CARE CONVERSATION - ADVANCED CARE PLANNING - NS PRO AD NO ADVANCE DIRECTIVE
Blood was infusing for transport
Patient presents multiple areas of ecchymosis located on left shoulder, left scapula, and across lower back with abrasions  There are several small abrasions on forehead  A small abrasion located on left and right knee 
Per Dr Parminder Yu, she would like the blood to run in over 15 minutes 
No

## 2025-03-26 NOTE — PROGRESS NOTE ADULT - SUBJECTIVE AND OBJECTIVE BOX
MICU PROGRESS NOTE     HISTORY OF PRESENT ILLNESS     NAEON. Afebrile. HR 70-s90s. SBPs 90s-140s. MV V A/c RR 20 Tv 460 mL PEEP 10 FiO2 40. Got MRIB yesterday. Had family meeting with MICU and neurology. Got HD yesterday with 1.5 L off.     Seen and examined at bedside without family present, patient remains unable to engage in meaningful interaction. Appears comfortable and not in pain.     ROS: All negative except as listed above.    VITAL SIGNS:  ICU Vital Signs Last 24 Hrs  T(C): 37.5 (26 Mar 2025 04:00), Max: 37.8 (25 Mar 2025 16:00)  T(F): 99.5 (26 Mar 2025 04:00), Max: 100 (25 Mar 2025 16:00)  HR: 78 (26 Mar 2025 07:24) (75 - 94)  BP: --  BP(mean): --  ABP: 117/60 (26 Mar 2025 07:00) (94/44 - 148/68)  ABP(mean): 77 (26 Mar 2025 07:00) (59 - 95)  RR: 20 (26 Mar 2025 07:00) (18 - 24)  SpO2: 97% (26 Mar 2025 07:24) (91% - 100%)    O2 Parameters below as of 26 Mar 2025 07:00  Patient On (Oxygen Delivery Method): ventilator    O2 Concentration (%): 90      Mode: AC/ CMV (Assist Control/ Continuous Mandatory Ventilation), RR (machine): 20, TV (machine): 460, FiO2: 100, PEEP: 10, MAP: 16, PIP: 34  Plateau pressure:   P/F ratio:     03-25 @ 07:01  -  03-26 @ 07:00  --------------------------------------------------------  IN: 1513 mL / OUT: 2170 mL / NET: -657 mL      CAPILLARY BLOOD GLUCOSE      POCT Blood Glucose.: 180 mg/dL (24 Mar 2025 11:38)      ECG: reviewed.    PHYSICAL EXAM:    General: NAD, normal habitus, mild jaundice   Neuro: AAOx3, on sedation, non responsive to noxious stimuli, closed eyes, no verbal sounds, sluggishly reactive pupils b/l   HEENT: icteric sclerae b/l   Chest: nonTTP   Heart: S1/S2, RRR   Lungs: CTA b/l, on MV   Abd: soft, nonTTP, nondistended   Ext: generalized nonpitting edema, LUE with bullae and desquamation along forearm, nodule on R 1st digit proximal phalanges   Pulses: radial 2+ b/l, dorsalis pedis 2+ b/l   Lines/tubes/drains: triple lumen in L femoral, OG tube, L IJV, daniel   Psych: unable to assess       MEDICATIONS:  MEDICATIONS  (STANDING):  allopurinol 50 milliGRAM(s) Oral <User Schedule>  aztreonam  IVPB 2000 milliGRAM(s) IV Intermittent <User Schedule>  chlorhexidine 0.12% Liquid 15 milliLiter(s) Oral Mucosa every 12 hours  chlorhexidine 2% Cloths 1 Application(s) Topical daily  fentaNYL   Infusion. 0.5 MICROgram(s)/kG/Hr (4.44 mL/Hr) IV Continuous <Continuous>  folic acid 1 milliGRAM(s) Oral daily  influenza   Vaccine 0.5 milliLiter(s) IntraMuscular once  levETIRAcetam 500 milliGRAM(s) Oral two times a day  midodrine 20 milliGRAM(s) Oral every 8 hours  norepinephrine Infusion 1 MICROgram(s)/kG/Min (167 mL/Hr) IV Continuous <Continuous>  petrolatum Ophthalmic Ointment 1 Application(s) Both EYES two times a day  propofol Infusion 25 MICROgram(s)/kG/Min (13.3 mL/Hr) IV Continuous <Continuous>    MEDICATIONS  (PRN):      ALLERGIES:  Allergies    penicillin (Pruritus)  hydroxyurea (Other)  piperacillin-tazobactam (Urticaria)  ceftriaxone (Anaphylaxis)    Intolerances        LABS:                        8.0    18.94 )-----------( 171      ( 26 Mar 2025 01:25 )             21.9     03-26    132[L]  |  93[L]  |  53[H]  ----------------------------<  110[H]  4.4   |  23  |  3.85[H]    Ca    8.7      26 Mar 2025 01:25  Phos  5.1     03-26  Mg     2.20     03-26    TPro  5.8[L]  /  Alb  2.6[L]  /  TBili  15.2[H]  /  DBili  14.1[H]  /  AST  125[H]  /  ALT  90[H]  /  AlkPhos  141[H]  03-26    PT/INR - ( 26 Mar 2025 01:25 )   PT: 12.3 sec;   INR: 1.06 ratio         PTT - ( 26 Mar 2025 01:25 )  PTT:32.0 sec  Urinalysis Basic - ( 26 Mar 2025 01:25 )    Color: x / Appearance: x / SG: x / pH: x  Gluc: 110 mg/dL / Ketone: x  / Bili: x / Urobili: x   Blood: x / Protein: x / Nitrite: x   Leuk Esterase: x / RBC: x / WBC x   Sq Epi: x / Non Sq Epi: x / Bacteria: x      ABG:  pH, Arterial: 7.39 (03-26-25 @ 01:25)  pCO2, Arterial: 42 mmHg (03-26-25 @ 01:25)  pO2, Arterial: 140 mmHg (03-26-25 @ 01:25)      vBG:    Micro:    Culture - Blood (collected 03-20-25 @ 10:04)  Source: Blood Blood-Peripheral  Final Report (03-25-25 @ 13:01):    No growth at 5 days        Culture - Sputum (collected 03-22-25 @ 17:15)  Source: ET Tube ET Tube  Gram Stain (03-22-25 @ 23:42):    Numerous polymorphonuclear leukocytes per low power field    Rare Squamous epithelial cells per low power field    No organisms seen per oil power field  Final Report (03-24-25 @ 06:52):    Commensal lay consistent with body site        RADIOLOGY & ADDITIONAL TESTS: Reviewed.

## 2025-03-26 NOTE — PROGRESS NOTE ADULT - PROBLEM SELECTOR PLAN 1
Pt w/ oliguric MATA iso hemorrhagic shock and PEA arrest. Likely ATN. NTDC placed and underwent HD on 3/34 iso worsening oliguric renal failure, w/ associated hyperkalemia and hypervolemia. Last HD 3/25. Labs from this morning reviewed. Electrolytes within acceptable range. 24hr UOP ~270cc. UA (3/16) w/ proteinuria and hematuria (21 RBC). Renal US (3/20) w/o hydronephrosis, and w/ large left renal subcapsular hematoma. CXR (3/22) w/ b/l hazy opacities. Pt hemodynamically unstable, volume overloaded. Plan for HD on 3/27, orders placed. Give bumex 3mg IVP x1. Rpt UA, check UPCR. Monitor labs, VS and UOP. Avoid nephrotoxins when possible. Dose medications for HD.

## 2025-03-26 NOTE — CONSULT NOTE ADULT - PROBLEM SELECTOR RECOMMENDATION 5
FULL CODE - see separate goc note   Patient's brother, Bala and sister, Radha are patient's surrogate decision makers.   Caregiver SW referral   Chaplaincmaximilian referral- Ezequiel logan

## 2025-03-26 NOTE — CONSULT NOTE ADULT - PROBLEM SELECTOR RECOMMENDATION 2
Pt w/ v/o iso oliguric renal failure. Plan as above.
Patient with multiple recurrent hospitalizations related to SCD  > Appreciate heme/onc recs   > Of note, patient was on chronic pain meds (oxycodone 30mg q4h prn) per I-stop for SCD

## 2025-03-26 NOTE — PROGRESS NOTE ADULT - ATTENDING COMMENTS
Mr. Alex Downey is a 44 year old man w/ PMHx sickle cell disease w/ prior acute chest syndrome, iron overload, Gout, HTN, RUE DVT (was on AC), renal lesion, admitted 3/15/25 sickle cell crisis, received pRBCs and  s/p L ureteroscopy w/ biopsy and stent placement on 3/19. Course has been complicated by PEA arrest 3/20 with unclear down time, possibly ~30 minutes pulseless. Overall data is concerning for poor prognosis. Patient has few CN reflexes intact but otherwise completely unresponsive. 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 MRI brain confirms what is already visible on repeat CT scan which is diffuse cortical injury. EEG with G[facility] at times appear time-locked with myoclonus, but also with interspersed periods of suppression. NSE is pending, but suspect patient will have profound disability and almost certainly permanent issues with arousal, little to no awareness. At best, could reach minimally conscious state, but more likely is persistent vegetative state vs death secondary to medical complication during coma. It is unlikely that patient will significantly interact with his environment or communicate with family. This was discussed with family at bedside today, who seem to favor palliative extubation.           -Please obtain first NSE 24-48 hrs post-cardiac arrest (Between 930am 3/21 and 930am 3/22)  -Neurology available for any further discussions with family, though seems to favor palliative extubation already  -continue keppra 500 mg TID (dose reduced due to decreased kidney function), (treating myoclonus)  -minimize sedation

## 2025-03-26 NOTE — CONSULT NOTE ADULT - SUBJECTIVE AND OBJECTIVE BOX
[facility]  Eve Tavares, Palliative Care Attending  Contact Info: Page [phone] (including Nights/Weekends), message on Microsoft Teams (Eve Tavares), or leave  at Palliative Office [phone] (non-urgent)     Date of [id number] @ 12:54  HPI:  44M with PMHx of Sickle cell disease (last transfusion/exchange transfusion 2024), prior acute chest syndrome, iron overload, Gout, HTN and RUE DVT 2024 on eliquis (on hold since 3/15 for upcoming cystoscopy, ureteroscopy on 3/19) who was sent in by his Hematologist for transfusion/low Hg prior to procedure (Hg of 5.3). Patient c/o back/LE pain at time of visit secondary to being on sickle cell crisis. Denies SOB or chest pain. No recent fevers, chills or sick contacts. No cough. No nausea/vomiting or abdominal pain. No melena or BRBPR. No urinary complaints, denies hematuria. Good appetite.    In ER /85, , RR 18, Temp 98.7F, O2 Sat 88% RA-placed on 4L NC  Received Tylenol IV, benadryl 50mg IV, dilaudid 2mg IV x3, 1U PRBC (15 Mar 2025 09:54)    PERTINENT PM/SXH:   Sickle cell anemia    Gout    Anemia requiring transfusions    Marijuana abuse    Pneumonia    Deep vein thrombosis (DVT)    Iron overload    Hypertension      History of cholecystectomy    History of removal of Port-a-Cath      FAMILY HISTORY:  Family history of sickle cell trait (Father, Mother)    Patient's father is  (Father)    Patient's mother is  (Mother)      Family Hx substance abuse [ ]yes [ ]no  ITEMS NOT CHECKED ARE NOT PRESENT    SOCIAL HISTORY:   Significant other/partner[ ]  Children[ ]  Christian/Spirituality:  Substance hx:  [ ]   Tobacco hx:  [ ]   Alcohol hx: [ ]   Home Opioid hx:  [ ] I-Stop Reference No:  Living Situation: [ ]Home  [ ]Long term care  [ ]Rehab [ ]Other    ADVANCE DIRECTIVES:    DNR/MOLST  [ ]  Living Will  [ ]   DECISION MAKER(s):  [ ] Health Care Proxy(s)  [ ] Surrogate(s)  [ ] Guardian           Name(s): Phone Number(s):    BASELINE (I)ADL(s) (prior to admission):  [place]: [ ]Total  [ ] Moderate [ ]Dependent    Allergies    penicillin (Pruritus)  hydroxyurea (Other)  piperacillin-tazobactam (Urticaria)  ceftriaxone (Anaphylaxis)    Intolerances    MEDICATIONS  (STANDING):  allopurinol 50 milliGRAM(s) Oral <User Schedule>  aztreonam  IVPB 2000 milliGRAM(s) IV Intermittent <User Schedule>  chlorhexidine 0.12% Liquid 15 milliLiter(s) Oral Mucosa every 12 hours  chlorhexidine 2% Cloths 1 Application(s) Topical daily  fentaNYL   Infusion. 0.5 MICROgram(s)/kG/Hr (4.44 mL/Hr) IV Continuous <Continuous>  folic acid 1 milliGRAM(s) Oral daily  influenza   Vaccine 0.5 milliLiter(s) IntraMuscular once  levETIRAcetam 500 milliGRAM(s) Oral two times a day  midodrine 20 milliGRAM(s) Oral every 8 hours  norepinephrine Infusion 1 MICROgram(s)/kG/Min (167 mL/Hr) IV Continuous <Continuous>  petrolatum Ophthalmic Ointment 1 Application(s) Both EYES two times a day  propofol Infusion 25 MICROgram(s)/kG/Min (13.3 mL/Hr) IV Continuous <Continuous>    MEDICATIONS  (PRN):    PRESENT SYMPTOMS: [ ]Unable to self-report  [ ] CPOT [ ] PAINADs [ ] RDOS  Source if other than patient:  [ ]Family   [ ]Team     Pain: [ ]yes [ ]no  QOL impact -   Location -                    Aggravating factors -  Quality -  Radiation -  Timing-  Severity (0-10 scale):  Minimal acceptable level/pain goal (0-10 scale):     CPOT:    https://www.[facility].org/getattachment/[email or web address]/Critical-Care-Pain-Observation-Tool-(CPOT)    Dyspnea:                           [ ]Mild [ ]Moderate [ ]Severe  Anxiety:                             [ ]Mild [ ]Moderate [ ]Severe  Fatigue:                             [ ]Mild [ ]Moderate [ ]Severe  Nausea:                             [ ]Mild [ ]Moderate [ ]Severe  Loss of appetite:              [ ]Mild [ ]Moderate [ ]Severe  Constipation:                    [ ]Mild [ ]Moderate [ ]Severe    Other Symptoms:  [ ]All other review of systems negative     PCSSQ[Palliative Care Spiritual Screening Question]   Severity (0-10):  Chaplaincy Referral: [ ] yes [ ] refused [ ] following [ ] deferred     Caregiver [place]? : [ ] yes [ ] no [ ] Deferred [ ] Declined             Social work referral [ ] Patient & Family Centered Care Referral [ ]  Anticipatory Grief present?:  [ ] yes [ ] no  [ ] Deferred                  Social work referral [ ] Patient & Family Centered Care Referral [ ]    PHYSICAL EXAM:  Vital Signs Last 24 Hrs  T(C): 37.8 (26 Mar 2025 12:00), Max: 37.8 (25 Mar 2025 16:00)  T(F): 100 (26 Mar 2025 12:00), Max: 100 (25 Mar 2025 16:00)  HR: 81 (26 Mar 2025 12:) (75 - 94)  BP: --  BP(mean): --  RR: 20 (26 Mar 2025 12:00) (18 - 24)  SpO2: 93% (26 Mar 2025 12:00) (91% - 100%)    Parameters below as of 26 Mar 2025 12:00  Patient On (Oxygen Delivery Method): ventilator    O2 Concentration (%): 80 I&O's Summary    25 Mar 2025 07:01  -  26 Mar 2025 07:00  --------------------------------------------------------  IN: 1513 mL / OUT: 2170 mL / NET: -657 mL    26 Mar 2025 07:01  -  26 Mar 2025 12:54  --------------------------------------------------------  IN: 281.3 mL / OUT: 5 mL / NET: 276.3 mL      GENERAL: [ ]Cachexia    [ ]Alert  [ ]Oriented x   [ ]Lethargic  [ ]Unarousable  [ ]Verbal  [ ]Non-Verbal  Behavioral:   [ ] Anxiety  [ ] Delirium [ ] Agitation [ ] Other  HEENT:  [ ]Normal   [ ]Dry mouth   [ ]ET Tube/Trach  [ ]Oral lesions  PULMONARY:   [ ]Clear [ ]Tachypnea  [ ]Audible excessive secretions   [ ]Rhonchi        [ ]Right [ ]Left [ ]Bilateral  [ ]Crackles        [ ]Right [ ]Left [ ]Bilateral  [ ]Wheezing     [ ]Right [ ]Left [ ]Bilateral  [ ]Diminished breath sounds [ ]right [ ]left [ ]bilateral  CARDIOVASCULAR:    [ ]Regular [ ]Irregular [ ]Tachy  [ ]Harley [ ]Murmur [ ]Other  GASTROINTESTINAL:  [ ]Soft  [ ]Distended   [ ]+BS  [ ]Non tender [ ]Tender  [ ]Other [ ]PEG [ ]OGT/ NGT  Last BM:  GENITOURINARY:  [ ]Normal [ ] Incontinent   [ ]Oliguria/Anuria   [ ]Dave  MUSCULOSKELETAL:   [ ]Normal   [ ]Weakness  [ ]Bed/Wheelchair bound [ ]Edema  NEUROLOGIC:   [ ]No focal deficits  [ ]Cognitive impairment  [ ]Dysphagia [ ]Dysarthria [ ]Paresis [ ]Other   SKIN: Please see flowsheets   [ ]Normal  [ ]Rash  [ ]Other  [ ]Pressure ulcer(s)       Present on admission [ ]y [ ]n    CRITICAL CARE:  [ ] Shock Present  [ ]Septic [ ]Cardiogenic [ ]Neurologic [ ]Hypovolemic  [ ]  Vasopressors [ ]  Inotropes   [ ]Respiratory failure present [ ]Mechanical ventilation [ ]Non-invasive ventilatory support [ ]High flow  Mode: AC/ CMV (Assist Control/ Continuous Mandatory Ventilation), RR (machine): 20, TV (machine): 460, FiO2: 80, PEEP: 10, MAP: 16, PIP: 33  [ ]Acute  [ ]Chronic [ ]Hypoxic  [ ]Hypercarbic [ ]Other  [ ]Other organ failure     LABS:                        8.0    18.94 )-----------( 171      ( 26 Mar 2025 01:25 )             21.9   -    132[L]  |  93[L]  |  53[H]  ----------------------------<  110[H]  4.4   |  23  |  3.85[H]    Ca    8.7      26 Mar 2025 01:25  Phos  5.1     -  Mg     2.20     -    TPro  5.8[L]  /  Alb  2.6[L]  /  TBili  15.2[H]  /  DBili  14.1[H]  /  AST  125[H]  /  ALT  90[H]  /  AlkPhos  141[H]  03-26  PT/INR - ( 26 Mar 2025 01:25 )   PT: 12.3 sec;   INR: 1.06 ratio         PTT - ( 26 Mar 2025 01:25 )  PTT:32.0 sec    Urinalysis Basic - ( 26 Mar 2025 01:25 )    Color: x / Appearance: x / SG: x / pH: x  Gluc: 110 mg/dL / Ketone: x  / Bili: x / Urobili: x   Blood: x / Protein: x / Nitrite: x   Leuk Esterase: x / RBC: x / WBC x   Sq Epi: x / Non Sq Epi: x / Bacteria: x      RADIOLOGY & ADDITIONAL STUDIES:    PROTEIN CALORIE MALNUTRITION PRESENT: [ ]mild [ ]moderate [ ]severe [ ]underweight [ ]morbid obesity  https://www.andeal.org/vault/2440/web/files/ONC/Table_Clinical%20Characteristics%20to%20Document%20Malnutrition-White%20JV%20et%20al%2020.pdf    Height (cm): 175 (25 @ 10:38), 170.2 (25 @ 10:17), 175.3 (24 @ 21:08)  Weight (kg): 88.8 (25 @ 10:38), 90.7 (25 @ 10:17), 87.1 (24 @ 21:08)  BMI (kg/m2): 29 (25 @ 10:38), 31.3 (25 @ 10:17), 28.3 (24 @ 21:08)    [ ]PPSV2 < or = to 30% [ ]significant weight loss  [ ]poor nutritional intake  [ ]anasarca[ ]Artificial Nutrition      Other REFERRALS:  [ ]Hospice  [ ]Child Life  [ ]Social Work  [ ]Case management [ ]Holistic Therapy     Goals of Care Document:  [facility]  Eve Tavares, Palliative Care Attending  Contact Info: Page [phone] (including Nights/Weekends), message on Microsoft Teams (Eve Tavares), or leave VM at Palliative Office [phone] (non-urgent)     Date of [id number] @ 12:54  HPI:  44M with PMHx of Sickle cell disease (last transfusion/exchange transfusion 2024), prior acute chest syndrome, iron overload, Gout, HTN and RUE DVT 2024 on eliquis (on hold since 3/15 for upcoming cystoscopy, ureteroscopy on 3/19) who was sent in by his Hematologist for transfusion/low Hg prior to procedure (Hg of 5.3). Patient c/o back/LE pain at time of visit secondary to being on sickle cell crisis. Denies SOB or chest pain. No recent fevers, chills or sick contacts. No cough. No nausea/vomiting or abdominal pain. No melena or BRBPR. No urinary complaints, denies hematuria. Good appetite.    In ER /85, , RR 18, Temp 98.7F, O2 Sat 88% RA-placed on 4L NC  Received Tylenol IV, benadryl 50mg IV, dilaudid 2mg IV x3, 1U PRBC (15 Mar 2025 09:54)    PERTINENT PM/SXH:   Sickle cell anemia    Gout    Anemia requiring transfusions    Marijuana abuse    Pneumonia    Deep vein thrombosis (DVT)    Iron overload    Hypertension      History of cholecystectomy    History of removal of Port-a-Cath      FAMILY HISTORY:  Family history of sickle cell trait (Father, Mother)    Patient's father is  (Father)    Patient's mother is  (Mother)      Family Hx substance abuse [ ]yes [ ]no  ITEMS NOT CHECKED ARE NOT PRESENT    SOCIAL HISTORY:   Significant other/partner[ ]  Children[ ]  Gnosticist/Spirituality:  Substance hx:  [ ]   Tobacco hx:  [ ]   Alcohol hx: [ ]   Home Opioid hx:  [ ] I-Stop Reference No:  his report was requested by: Eve Tavares | Reference #: [id number]    Practitioner Count: 2  Pharmacy Count: 1  Current Opioid Prescriptions: 2  Current Benzodiazepine Prescriptions: 0  Current Stimulant Prescriptions: 0      Patient Demographic Information (PDI)       PDI	First Name	Last Name	Birth Date	Gender	Street Address	[facility]	Zip Code  DAVID Downey	1980	Male	[phone]	[facility]	[id number]  B	Alex Downey	1980	Male	[address]	[facility]	[id number]    Prescription Information      PDI Filter:    PDI	My Rx	Current Rx	Drug Type	Rx Written	Rx Dispensed	Drug	Quantity	Days Supply	Prescriber Name	Prescriber NANCY #	Payment Method	Dispenser  A	N	N	O	2024	oxycodone hcl (ir) 30 mg tab	180	30	Jordan, Shirley SOLITARIO	[id number]	[facility]  A	N	N	O	2024	07/15/2024	oxycodone hcl (ir) 30 mg tab	180	30	Jag Ayala	[id number]	[facility]   A	N	N	O	2024	oxycodone hcl (ir) 30 mg tab	180	30	Jag Ayala	[id number]	Medicaid	[facility]   A	N	N	O	2024	oxycodone hcl (ir) 30 mg tab	180	30	JordanShirley MD	[id number]	Medicaid	[place]   A	N	N	O	2024	oxycodone hcl (ir) 30 mg tab	180	30	JordanShirley MD	[id number]	Medicaid	[place]   B	N	Y	O	2025	oxycodone hcl (ir) 30 mg tab	180	45	Jordan, Shirley SOLITARIO	[id number]	Medicaid	[facility]  B	N	Y	O	2025	oxycodone hcl (ir) 30 mg tab	180	45	Jag Ayala	[id number]	Medicaid	[facility]  B	N	N	O	2025	oxycodone hcl (ir) 30 mg tab	180	30	JordanShirley MD	[id number]	Medicaid	[facility]  B	N	N	O	2024	oxycodone hcl (ir) 30 mg tab	180	30	JordanShirley MD	[id number]	Medicaid	[facility]  B	N	N	O	10/24/2024	10/29/2024	oxycodone hcl (ir) 30 mg tab	180	30	Jag Ayala	[id number]	Medicaid	[facility]  B	N	N	O	2024	oxycodone hcl (ir) 30 mg tab	180	30	Jag Ayala	[id number]	Medicaid	[facility]  B	N	N	O	2024	oxycodone hcl (ir) 30 mg tab	180	30	Shirley Jordan MD	[id number]	Medicaid	[facility]  Living Situation: [x ]Home  [ ]Long term care  [ ]Rehab [ ]Other    ADVANCE DIRECTIVES:    DNR/MOLST  [ ]  Living Will  [ ]   DECISION MAKER(s):   [ ] Health Care Proxy(s)  [ x] Surrogate(s)  [ ] Guardian           Name(s): pratibha Downey (brother) and Rahda Downey (sister) Phone Number(s): [phone]    BASELINE (I)ADL(s) (prior to admission): Patient independent of ADLs prior to hospitalization   [place]: [ ]Total  [ ] Moderate [ x]Dependent    Allergies    penicillin (Pruritus)  hydroxyurea (Other)  piperacillin-tazobactam (Urticaria)  ceftriaxone (Anaphylaxis)    Intolerances    MEDICATIONS  (STANDING):  allopurinol 50 milliGRAM(s) Oral <User Schedule>  aztreonam  IVPB 2000 milliGRAM(s) IV Intermittent <User Schedule>  chlorhexidine 0.12% Liquid 15 milliLiter(s) Oral Mucosa every 12 hours  chlorhexidine 2% Cloths 1 Application(s) Topical daily  fentaNYL   Infusion. 0.5 MICROgram(s)/kG/Hr (4.44 mL/Hr) IV Continuous <Continuous>  folic acid 1 milliGRAM(s) Oral daily  influenza   Vaccine 0.5 milliLiter(s) IntraMuscular once  levETIRAcetam 500 milliGRAM(s) Oral two times a day  midodrine 20 milliGRAM(s) Oral every 8 hours  norepinephrine Infusion 1 MICROgram(s)/kG/Min (167 mL/Hr) IV Continuous <Continuous>  petrolatum Ophthalmic Ointment 1 Application(s) Both EYES two times a day  propofol Infusion 25 MICROgram(s)/kG/Min (13.3 mL/Hr) IV Continuous <Continuous>    MEDICATIONS  (PRN):    PRESENT SYMPTOMS: [x ]Unable to self-report  [ ] CPOT [x ] PAINADs [ x] RDOS  Source if other than patient:  [ ]Family   [ ]Team     Pain: [ ]yes [ ]no  QOL impact -   Location -                    Aggravating factors -  Quality -  Radiation -  Timing-  Severity (0-10 scale):  Minimal acceptable level/pain goal (0-10 scale):     CPOT:    https://www.[facility].org/getattachment/[email or web address]/Critical-Care-Pain-Observation-Tool-(CPOT)    Dyspnea:                           [ ]Mild [ ]Moderate [ ]Severe  Anxiety:                             [ ]Mild [ ]Moderate [ ]Severe  Fatigue:                             [ ]Mild [ ]Moderate [ ]Severe  Nausea:                             [ ]Mild [ ]Moderate [ ]Severe  Loss of appetite:              [ ]Mild [ ]Moderate [ ]Severe  Constipation:                    [ ]Mild [ ]Moderate [ ]Severe    Other Symptoms:  [x ]All other review of systems negative     PCSSQ[Palliative Care Spiritual Screening Question]   Severity (0-10): Family amenable to  referral   Chaplaincy Referral: [ x] yes [ ] refused [ ] following [ ] deferred     Caregiver [place]? : [x ] yes [ ] no [ ] Deferred [ ] Declined             Social work referral [ x] Patient & Family Centered Care Referral [ ]  Anticipatory Grief present?:  [ x] yes [ ] no  [ ] Deferred                  Social work referral [x ] Patient & Family Centered Care Referral [ ]    PHYSICAL EXAM:  Vital Signs Last 24 Hrs  T(C): 37.8 (26 Mar 2025 12:00), Max: 37.8 (25 Mar 2025 16:00)  T(F): 100 (26 Mar 2025 12:00), Max: 100 (25 Mar 2025 16:00)  HR: 81 (26 Mar 2025 12:00) (75 - 94)  BP: --  BP(mean): --  RR: 20 (26 Mar 2025 12:00) (18 - 24)  SpO2: 93% (26 Mar 2025 12:00) (91% - 100%)    Parameters below as of 26 Mar 2025 12:00  Patient On (Oxygen Delivery Method): ventilator    O2 Concentration (%): 80 I&O's Summary    25 Mar 2025 07:01  -  26 Mar 2025 07:00  --------------------------------------------------------  IN: 1513 mL / OUT: 2170 mL / NET: -657 mL    26 Mar 2025 07:01  -  26 Mar 2025 12:54  --------------------------------------------------------  IN: 281.3 mL / OUT: 5 mL / NET: 276.3 mL      GENERAL: [ ]Cachexia   Pt intubated and off sedation   [ ]Alert  [ ]Oriented x   [ ]Lethargic  [ ]Unarousable  [ ]Verbal  [ ]Non-Verbal  Behavioral:   [ ] Anxiety  [ ] Delirium [ ] Agitation [x] Other  HEENT:  [ ]Normal   [ ]Dry mouth   [x ]ET Tube/Trach  [ ]Oral lesions  PULMONARY:   [ ]Clear [ ]Tachypnea  [ ]Audible excessive secretions   [ ]Rhonchi        [ ]Right [ ]Left [ ]Bilateral  [ ]Crackles        [ ]Right [ ]Left [ ]Bilateral  [ ]Wheezing     [ ]Right [ ]Left [ ]Bilateral  [x ]Diminished breath sounds [ ]right [ ]left [ ]bilateral  CARDIOVASCULAR:    [x ]Regular [ ]Irregular [ ]Tachy  [ ]Harley [ ]Murmur [ ]Other  GASTROINTESTINAL:  [ ]Soft  [ ]Distended   [ x]+BS  [ ]Non tender [x ]Tender  [ ]Other [ ]PEG [x ]OGT/ NGT  Last BM: 3/26  GENITOURINARY:  [ ]Normal [ ] Incontinent   [ ]Oliguria/Anuria   [ x]Dave  MUSCULOSKELETAL:   [ ]Normal   [ ]Weakness  [x ]Bed/Wheelchair bound [ ]Edema  NEUROLOGIC:   [ ]No focal deficits  [ ]Cognitive impairment  [ ]Dysphagia [ ]Dysarthria [ ]Paresis [x ]Other - anoxic brain injury   SKIN: Please see flowsheets   [ ]Normal  [ ]Rash  [ x]Other  [ ]Pressure ulcer(s)       Present on admission [ ]y [ ]n    CRITICAL CARE:  [ ] Shock Present  [ ]Septic [ ]Cardiogenic [ ]Neurologic [ ]Hypovolemic  [ ]  Vasopressors [ ]  Inotropes   [ ]Respiratory failure present [ ]Mechanical ventilation [ ]Non-invasive ventilatory support [ ]High flow  Mode: AC/ CMV (Assist Control/ Continuous Mandatory Ventilation), RR (machine): 20, TV (machine): 460, FiO2: 80, PEEP: 10, MAP: 16, PIP: 33  [ ]Acute  [ ]Chronic [ ]Hypoxic  [ ]Hypercarbic [ ]Other  [ ]Other organ failure     LABS:                        8.0    18.94 )-----------( 171      ( 26 Mar 2025 01:25 )             21.9       132[L]  |  93[L]  |  53[H]  ----------------------------<  110[H]  4.4   |  23  |  3.85[H]    Ca    8.7      26 Mar 2025 01:25  Phos  5.1       Mg     2.20         TPro  5.8[L]  /  Alb  2.6[L]  /  TBili  15.2[H]  /  DBili  14.1[H]  /  AST  125[H]  /  ALT  90[H]  /  AlkPhos  141[H]  26  PT/INR - ( 26 Mar 2025 01:25 )   PT: 12.3 sec;   INR: 1.06 ratio         PTT - ( 26 Mar 2025 01:25 )  PTT:32.0 sec    Urinalysis Basic - ( 26 Mar 2025 01:25 )    Color: x / Appearance: x / SG: x / pH: x  Gluc: 110 mg/dL / Ketone: x  / Bili: x / Urobili: x   Blood: x / Protein: x / Nitrite: x   Leuk Esterase: x / RBC: x / WBC x   Sq Epi: x / Non Sq Epi: x / Bacteria: x      RADIOLOGY & ADDITIONAL STUDIES: < from: MR Head w/wo IV Cont (25 @ 20:02) >  FINDINGS:  No abnormal enhancement.  There is diffuse global abnormal supratentorial cortical restricted   diffusion compatible with global hypoxic ischemic injury.. No other   abnormal signal is demonstrated throughout the brain parenchyma. Normal   T2 flow-voids are seen within  the intracranial vasculature. The lateral   ventricles and cortical sulci are age-appropriate in size and   configuration. There is no mass, mass effect, or extra-axial fluid   collection. There is no susceptibility artifact to suggest hemorrhage.   Midline structures are normal. Large bilateral mastoid air cell   effusions. Mild inflammatory mucosal changes are seen throughout the   various portions of the paranasal sinuses. The orbits and mastoid air   cells are unremarkable.    IMPRESSION: Global hypoxic ischemic injury.    < end of copied text >      PROTEIN CALORIE MALNUTRITION PRESENT: [ ]mild [ ]moderate [ ]severe [ ]underweight [ ]morbid obesity  https://www.andeal.org/vault/5740/web/files/ONC/Table_Clinical%20Characteristics%20to%20Document%20Malnutrition-White%20JV%20et%20al%2020.pdf    Height (cm): 175 (25 @ 10:38), 170.2 (25 @ 10:17), 175.3 (24 @ 21:08)  Weight (kg): 88.8 (25 @ 10:38), 90.7 (25 @ 10:17), 87.1 (24 @ 21:08)  BMI (kg/m2): 29 (25 @ 10:38), 31.3 (25 @ 10:17), 28.3 (24 @ 21:08)    [x ]PPSV2 < or = to 30% [ ]significant weight loss  [ ]poor nutritional intake  [ ]anasarca[ ]Artificial Nutrition      Other REFERRALS:  [ ]Hospice  [ ]Child Life  [ ]Social Work  [ ]Case management [ ]Holistic Therapy

## 2025-03-27 LAB
ALBUMIN SERPL ELPH-MCNC: 2.6 G/DL — LOW (ref 3.3–5)
ALP SERPL-CCNC: 165 U/L — HIGH (ref 40–120)
ALT FLD-CCNC: 62 U/L — HIGH (ref 4–41)
ANION GAP SERPL CALC-SCNC: 19 MMOL/L — HIGH (ref 7–14)
APTT BLD: 31 SEC — SIGNIFICANT CHANGE UP (ref 24.5–35.6)
AST SERPL-CCNC: 100 U/L — HIGH (ref 4–40)
BASOPHILS # BLD AUTO: 0.15 K/UL — SIGNIFICANT CHANGE UP (ref 0–0.2)
BASOPHILS NFR BLD AUTO: 0.8 % — SIGNIFICANT CHANGE UP (ref 0–2)
BILIRUB DIRECT SERPL-MCNC: 13.9 MG/DL — SIGNIFICANT CHANGE UP (ref 0–0.3)
BILIRUB INDIRECT FLD-MCNC: 1.4 MG/DL — SIGNIFICANT CHANGE UP (ref 0–1)
BILIRUB SERPL-MCNC: 15.3 MG/DL — HIGH (ref 0.2–1.2)
BILIRUB SERPL-MCNC: 15.3 MG/DL — HIGH (ref 0.2–1.2)
BUN SERPL-MCNC: 71 MG/DL — HIGH (ref 7–23)
CA-I BLD-SCNC: 1.15 MMOL/L — SIGNIFICANT CHANGE UP (ref 1.15–1.29)
CALCIUM SERPL-MCNC: 9 MG/DL — SIGNIFICANT CHANGE UP (ref 8.4–10.5)
CHLORIDE SERPL-SCNC: 95 MMOL/L — LOW (ref 98–107)
CK SERPL-CCNC: 96 U/L — SIGNIFICANT CHANGE UP (ref 30–200)
CO2 SERPL-SCNC: 21 MMOL/L — LOW (ref 22–31)
CREAT SERPL-MCNC: 5.04 MG/DL — HIGH (ref 0.5–1.3)
EGFR: 14 ML/MIN/1.73M2 — LOW
EGFR: 14 ML/MIN/1.73M2 — LOW
EOSINOPHIL # BLD AUTO: 0.37 K/UL — SIGNIFICANT CHANGE UP (ref 0–0.5)
EOSINOPHIL NFR BLD AUTO: 1.9 % — SIGNIFICANT CHANGE UP (ref 0–6)
G6PD RBC-CCNC: 20.8 U/G HGB — HIGH (ref 7–20.5)
GAS PNL BLDA: SIGNIFICANT CHANGE UP
GLUCOSE SERPL-MCNC: 120 MG/DL — HIGH (ref 70–99)
HAPTOGLOB SERPL-MCNC: <20 MG/DL — LOW (ref 34–200)
HCT VFR BLD CALC: 21.4 % — LOW (ref 39–50)
HGB BLD-MCNC: 7.9 G/DL — LOW (ref 13–17)
IANC: 14.33 K/UL — HIGH (ref 1.8–7.4)
IMM GRANULOCYTES NFR BLD AUTO: 2.6 % — HIGH (ref 0–0.9)
INR BLD: 1.04 RATIO — SIGNIFICANT CHANGE UP (ref 0.85–1.16)
LDH SERPL L TO P-CCNC: 933 U/L — HIGH (ref 135–225)
LYMPHOCYTES # BLD AUTO: 1.54 K/UL — SIGNIFICANT CHANGE UP (ref 1–3.3)
LYMPHOCYTES # BLD AUTO: 8.1 % — LOW (ref 13–44)
MAGNESIUM SERPL-MCNC: 2.4 MG/DL — SIGNIFICANT CHANGE UP (ref 1.6–2.6)
MCHC RBC-ENTMCNC: 29.8 PG — SIGNIFICANT CHANGE UP (ref 27–34)
MCHC RBC-ENTMCNC: 36.9 G/DL — HIGH (ref 32–36)
MCV RBC AUTO: 80.8 FL — SIGNIFICANT CHANGE UP (ref 80–100)
MONOCYTES # BLD AUTO: 2.18 K/UL — HIGH (ref 0–0.9)
MONOCYTES NFR BLD AUTO: 11.4 % — SIGNIFICANT CHANGE UP (ref 2–14)
NEUTROPHILS # BLD AUTO: 14.33 K/UL — HIGH (ref 1.8–7.4)
NEUTROPHILS NFR BLD AUTO: 75.2 % — SIGNIFICANT CHANGE UP (ref 43–77)
NON-GYNECOLOGICAL CYTOLOGY STUDY: SIGNIFICANT CHANGE UP
NRBC # BLD AUTO: 0.15 K/UL — HIGH (ref 0–0)
NRBC # FLD: 0.15 K/UL — HIGH (ref 0–0)
NRBC BLD AUTO-RTO: 0 /100 WBCS — SIGNIFICANT CHANGE UP (ref 0–0)
PHOSPHATE SERPL-MCNC: 6.2 MG/DL — HIGH (ref 2.5–4.5)
PLATELET # BLD AUTO: 241 K/UL — SIGNIFICANT CHANGE UP (ref 150–400)
POTASSIUM SERPL-MCNC: 4.5 MMOL/L — SIGNIFICANT CHANGE UP (ref 3.5–5.3)
POTASSIUM SERPL-SCNC: 4.5 MMOL/L — SIGNIFICANT CHANGE UP (ref 3.5–5.3)
PROT SERPL-MCNC: 6 G/DL — SIGNIFICANT CHANGE UP (ref 6–8.3)
PROTHROM AB SERPL-ACNC: 12.1 SEC — SIGNIFICANT CHANGE UP (ref 9.9–13.4)
RBC # BLD: 2.65 M/UL — LOW (ref 4.2–5.8)
RBC # BLD: 2.65 M/UL — LOW (ref 4.2–5.8)
RBC # FLD: 18.6 % — HIGH (ref 10.3–14.5)
RETICS #: 132.8 K/UL — HIGH (ref 25–125)
RETICS/RBC NFR: 5 % — HIGH (ref 0.5–2.5)
SODIUM SERPL-SCNC: 135 MMOL/L — SIGNIFICANT CHANGE UP (ref 135–145)
URATE SERPL-MCNC: 6.9 MG/DL — SIGNIFICANT CHANGE UP (ref 3.4–8.8)
WBC # BLD: 19.07 K/UL — HIGH (ref 3.8–10.5)
WBC # FLD AUTO: 19.07 K/UL — HIGH (ref 3.8–10.5)

## 2025-03-27 PROCEDURE — 99291 CRITICAL CARE FIRST HOUR: CPT | Mod: GC

## 2025-03-27 PROCEDURE — 99232 SBSQ HOSP IP/OBS MODERATE 35: CPT

## 2025-03-27 PROCEDURE — 99233 SBSQ HOSP IP/OBS HIGH 50: CPT | Mod: GC

## 2025-03-27 PROCEDURE — 99233 SBSQ HOSP IP/OBS HIGH 50: CPT

## 2025-03-27 RX ORDER — MEROPENEM 1 G/30ML
500 INJECTION INTRAVENOUS EVERY 24 HOURS
Refills: 0 | Status: DISCONTINUED | OUTPATIENT
Start: 2025-03-27 | End: 2025-04-01

## 2025-03-27 RX ORDER — ACETAMINOPHEN 500 MG/5ML
1000 LIQUID (ML) ORAL ONCE
Refills: 0 | Status: COMPLETED | OUTPATIENT
Start: 2025-03-27 | End: 2025-03-27

## 2025-03-27 RX ORDER — FENTANYL CITRATE-0.9 % NACL/PF 100MCG/2ML
50 SYRINGE (ML) INTRAVENOUS ONCE
Refills: 0 | Status: DISCONTINUED | OUTPATIENT
Start: 2025-03-27 | End: 2025-03-27

## 2025-03-27 RX ORDER — BUMETANIDE 1 MG/1
4 TABLET ORAL ONCE
Refills: 0 | Status: COMPLETED | OUTPATIENT
Start: 2025-03-27 | End: 2025-03-27

## 2025-03-27 RX ADMIN — Medication 400 MILLIGRAM(S): at 17:59

## 2025-03-27 RX ADMIN — MEROPENEM 100 MILLIGRAM(S): 1 INJECTION INTRAVENOUS at 11:14

## 2025-03-27 RX ADMIN — Medication 1 APPLICATION(S): at 17:45

## 2025-03-27 RX ADMIN — Medication 2 MILLIGRAM(S): at 17:34

## 2025-03-27 RX ADMIN — Medication 50 MICROGRAM(S): at 18:05

## 2025-03-27 RX ADMIN — Medication 15 MILLILITER(S): at 05:43

## 2025-03-27 RX ADMIN — LEVETIRACETAM 500 MILLIGRAM(S): 10 INJECTION, SOLUTION INTRAVENOUS at 05:43

## 2025-03-27 RX ADMIN — MIDODRINE HYDROCHLORIDE 20 MILLIGRAM(S): 5 TABLET ORAL at 11:11

## 2025-03-27 RX ADMIN — MIDODRINE HYDROCHLORIDE 20 MILLIGRAM(S): 5 TABLET ORAL at 17:45

## 2025-03-27 RX ADMIN — BUMETANIDE 132 MILLIGRAM(S): 1 TABLET ORAL at 11:15

## 2025-03-27 RX ADMIN — Medication 1000 MILLIGRAM(S): at 18:44

## 2025-03-27 RX ADMIN — Medication 15 MILLILITER(S): at 18:06

## 2025-03-27 RX ADMIN — Medication 1 APPLICATION(S): at 05:44

## 2025-03-27 RX ADMIN — Medication 1000 MILLIGRAM(S): at 00:19

## 2025-03-27 RX ADMIN — Medication 50 MICROGRAM(S): at 18:00

## 2025-03-27 RX ADMIN — LEVETIRACETAM 500 MILLIGRAM(S): 10 INJECTION, SOLUTION INTRAVENOUS at 17:45

## 2025-03-27 RX ADMIN — Medication 1 APPLICATION(S): at 11:29

## 2025-03-27 RX ADMIN — FOLIC ACID 1 MILLIGRAM(S): 1 TABLET ORAL at 11:11

## 2025-03-27 RX ADMIN — Medication 2 MILLIGRAM(S): at 22:01

## 2025-03-27 NOTE — PROGRESS NOTE ADULT - SUBJECTIVE AND OBJECTIVE BOX
Patient is a 44y old  Male who presents with a chief complaint of Referred by Hematologist for low Hg (27 Mar 2025 13:09)    Patient seen this afternoon, multiple family members at bedside.    MEDICATIONS  (STANDING):  allopurinol 50 milliGRAM(s) Oral <User Schedule>  chlorhexidine 0.12% Liquid 15 milliLiter(s) Oral Mucosa every 12 hours  chlorhexidine 2% Cloths 1 Application(s) Topical daily  folic acid 1 milliGRAM(s) Oral daily  influenza   Vaccine 0.5 milliLiter(s) IntraMuscular once  levETIRAcetam 500 milliGRAM(s) Oral two times a day  meropenem  IVPB 500 milliGRAM(s) IV Intermittent every 24 hours  midodrine 20 milliGRAM(s) Oral every 8 hours  norepinephrine Infusion 1 MICROgram(s)/kG/Min (167 mL/Hr) IV Continuous <Continuous>  petrolatum Ophthalmic Ointment 1 Application(s) Both EYES two times a day    MEDICATIONS  (PRN):  midazolam Injectable 2 milliGRAM(s) IV Push every 4 hours PRN tremors        Vital Signs Last 24 Hrs  T(C): 37.5 (27 Mar 2025 12:00), Max: 39 (26 Mar 2025 20:00)  T(F): 99.5 (27 Mar 2025 12:00), Max: 102.2 (26 Mar 2025 20:00)  HR: 86 (27 Mar 2025 14:00) (81 - 100)  BP: --  BP(mean): --  RR: 16 (27 Mar 2025 14:00) (15 - 22)  SpO2: 93% (27 Mar 2025 14:00) (93% - 98%)    Parameters below as of 27 Mar 2025 14:00  Patient On (Oxygen Delivery Method): ventilator, PSV    O2 Concentration (%): 80    PE  NAD  intubated                          7.9    19.07 )-----------( 241      ( 27 Mar 2025 01:45 )             21.4       03-27    135  |  95[L]  |  71[H]  ----------------------------<  120[H]  4.5   |  21[L]  |  5.04[H]    Ca    9.0      27 Mar 2025 01:45  Phos  6.2     03-27  Mg     2.40     03-27    TPro  6.0  /  Alb  2.6[L]  /  TBili  15.3[H]  /  DBili  13.9  /  AST  100[H]  /  ALT  62[H]  /  AlkPhos  165[H]  03-27

## 2025-03-27 NOTE — PROGRESS NOTE ADULT - PROBLEM SELECTOR PLAN 2
Patient with multiple recurrent hospitalizations related to SCD  > Appreciate heme/onc recs   > Of note, patient was on chronic pain meds (oxycodone 30mg q4h prn) per I-stop for SCD.

## 2025-03-27 NOTE — PROGRESS NOTE ADULT - TIME BILLING
Time spent for extensive review of the physical chart, electronic medical record, and documentation to obtain collateral information including but not limited to:  [x ] Inpatient records (ED, H&P, primary team, and consultants if applicable, care coordination)  [x ] Inpatient values/results (biomarkers, immunoassays, imaging, and microbiology results)  [x ] Current or proposed treatment plans  [x  ] Discussion with the primary team  [x ] Discussion with the patient, surrogate decision maker, or family  [x] 30 mins spent discussing goc     Time spent: >75 min Time spent for extensive review of the physical chart, electronic medical record, and documentation to obtain collateral information including but not limited to:  [x ] Inpatient records (ED, H&P, primary team, and consultants if applicable, care coordination)  [x ] Inpatient values/results (biomarkers, immunoassays, imaging, and microbiology results)  [x ] Current or proposed treatment plans  [x  ] Discussion with the primary team  [x ] Discussion with the patient, surrogate decision maker, or family    Time spent: >50 min

## 2025-03-27 NOTE — PROGRESS NOTE ADULT - SUBJECTIVE AND OBJECTIVE BOX
Neurology Progress Note      INTERVAL HISTORY:  No acute events overnight. Exam not significantly changed from yesterday.      REVIEW OF SYSTEMS: Otherwise denies fever, chills, headaches, vision changes, blurry vision, double vision, nausea, vomiting, hearing change, focal weakness, focal numbness, paresthesias bowel/ bladder incontinence.  Few questions of a 10-system ROS was performed and is negative except for those items noted above and/or in the HPI.    VITALS & EXAMINATION:  Vital Signs Last 24 Hrs  T(C): 37.9 (27 Mar 2025 04:00), Max: 39 (26 Mar 2025 20:00)  T(F): 100.2 (27 Mar 2025 04:00), Max: 102.2 (26 Mar 2025 20:00)  HR: 87 (27 Mar 2025 10:47) (75 - 100)  BP: --  BP(mean): --  RR: 19 (27 Mar 2025 06:00) (15 - 25)  SpO2: 95% (27 Mar 2025 10:47) (93% - 98%)    Parameters below as of 27 Mar 2025 04:00  Patient On (Oxygen Delivery Method): ventilator    O2 Concentration (%): 80    General:  Constitutional: Male, appears stated age, nontoxic, not in distress,    Head: Normocephalic;   Eyes: clear sclera;   Extremities: No cyanosis;   Resp: breathing comfortably  Neck: no nuchal rigidity       Neurological (>12):  Eyes closed  no response to voice or tactile or noxious  PERRL sluggishly, 4>3mm b/l  No corneals, no BTT  +OCR  +cough to suction  No response to noxious in any extremities  no grimace to noxious  Flaccid tone in all four limbs    LABORATORY:  CBC                       7.9    19.07 )-----------( 241      ( 27 Mar 2025 01:45 )             21.4     Chem 03-27    135  |  95[L]  |  71[H]  ----------------------------<  120[H]  4.5   |  21[L]  |  5.04[H]    Ca    9.0      27 Mar 2025 01:45  Phos  6.2     03-27  Mg     2.40     03-27    TPro  6.0  /  Alb  2.6[L]  /  TBili  15.3[H]  /  DBili  13.9  /  AST  100[H]  /  ALT  62[H]  /  AlkPhos  165[H]  03-27    LFTs LIVER FUNCTIONS - ( 27 Mar 2025 01:45 )  Alb: 2.6 g/dL / Pro: 6.0 g/dL / ALK PHOS: 165 U/L / ALT: 62 U/L / AST: 100 U/L / GGT: x           Coagulopathy PT/INR - ( 27 Mar 2025 01:45 )   PT: 12.1 sec;   INR: 1.04 ratio        PTT - ( 27 Mar 2025 01:45 )  PTT:31.0 sec  Lipid Panel 03-24 Chol -- LDL -- HDL -- Trig 348[H]      STUDIES & IMAGING: (EEG, CT, MR, U/S, TTE/PA):    EEG  Study Date: 03-22-25 08:00 - 08:00 03-23-25  Duration x Hours: 24 hrs  Clinical Impression:    1. GPDs can be seen in the setting of toxic/metabolic etiologies and can be associated with seizures at frequencies > 2.5 Hz  2. Risk of focal onset seizures in the left posterior temporal region  3. Severe diffuse/multifocal cerebral dysfunction, likely in part due to sedative effect    MRI brain: 3/25    IMPRESSION: Global hypoxic ischemic injury.

## 2025-03-27 NOTE — CHART NOTE - NSCHARTNOTEFT_GEN_A_CORE
Palliative care SW  follow-up with family to address psychosocial needs, assess coping with recent diagnosis & prognosis, and provide emotional support.  Multiple friends and family coming to visit patient. Family tearful at bedside. Per discussion with MICU team, family needs more time before making any further decisions. Emotional support provided to friends/family at bedside. Will remain available for continued support and on-going goals of care discussion.

## 2025-03-27 NOTE — PROGRESS NOTE ADULT - PROBLEM SELECTOR PLAN 1
Pt w/ oliguric MATA iso hemorrhagic shock and PEA arrest. Likely ATN. NTDC placed and underwent HD on 3/34 iso worsening oliguric renal failure, w/ associated hyperkalemia and hypervolemia. Last HD 3/25. Labs from this morning reviewed. Electrolytes within acceptable range. 24hr UOP ~160cc. UA (3/16) w/ proteinuria and hematuria (21 RBC). Renal US (3/20) w/o hydronephrosis, and w/ large left renal subcapsular hematoma. CXR (3/22) w/ b/l hazy opacities. Pt hemodynamically unstable, volume overloaded. However MRI w/ global hypoxic injury. Pt w/ poor prognosis. No urgent indication for HD will hold off today and assess daily. Give bumex 4mg IVP x1. Monitor labs, VS and UOP. Avoid nephrotoxins when possible. Dose medications for HD. Pt w/ oliguric MATA iso hemorrhagic shock and PEA arrest. Likely ATN. NTDC placed and underwent HD on 3/34 iso worsening oliguric renal failure, w/ associated hyperkalemia and hypervolemia. Last HD 3/25. Labs from this morning reviewed. Electrolytes within acceptable range. 24hr UOP ~160cc. UA (3/16) w/ proteinuria and hematuria (21 RBC). Renal US (3/20) w/o hydronephrosis, and w/ large left renal subcapsular hematoma. CXR (3/22) w/ b/l hazy opacities. Pt hemodynamically unstable, volume overloaded. However MRI w/ global hypoxic injury. Pt w/ poor prognosis. Give bumex 4mg IVP x1. Plan for HD later today. Monitor labs, VS and UOP. Avoid nephrotoxins when possible. Dose medications for HD.

## 2025-03-27 NOTE — PROGRESS NOTE ADULT - ASSESSMENT
Patient is 44y Male with PMHx of sickle cell disease admitted for anemia concerning for sickle cell crisis s/p ureteroscopy w/ L kidney biopsy 3/19, post-operative course c/b acute hemorrhagic shock, PEA arrest with ROSC; currently with acute renal failure on HD and global anoxic brain injury and urothelial cancer and ongoing goals of care.     NEURO:  #Sedated  AAOx0. Not on sedation, having fasciulations in upper body. Guarded prognosis.   - D/c propofol   - D/c fenatnyl   - S/p versed gtt, weaned off 3/23  - Repeat CT B 3/24 showing diffuse sulcal effacement with increased intracrnail pressure, and few patchy foci of cortical blurring   - 3/25 MRIB with diffuse global abnormal supratentorial cortical restricted diffusion consistent with global hypoxic injury     #Seizures  Fasciulations of upper body.   - Witness myoclonic jerking concerning for seizures iso anoxic brain injury  - s/p 2.5g keppra load  - vEEG report: "Abundant myoclonic seizures in the setting of a severe diffuse/multifocal cerebral dysfunction which can be seen in the setting of sedative effect or due to an anoxic injury, with improvement after 20:50 suggesting a lowering of sedation"; will f/u with neuro recommendations  -C/w levetiracetam 500 mg po BID   - Trend enolase   - repeat vEEG 3/24 showing severe cerebral dysfunction     CV:  #Shock - hemorrhagic shock s/p kidney biopsy s/p 5u pRBC, 3 plasma, 3 plt   Hemodynamically stable.   last dose of lovenox AC on 3/18 at 5PM   Hgb 8 -> ~3   - Wean levophed, MAP goal>65  - Monitor cbc q24  -C/w midodrine  20 mg po TID     #Hx of VTE (R IJ thrombus, L brachial DVT)  - CTM, hold AC given hemorrhage  - Bullae present on arm with DVT,  - Appreciate orthopedics hand surgery consult, not compartment syndrome     PULM:  #Acute hypoxic respiratory failure  In the setting of PEA arrest   CXR with R lung field and left mid and lower lung field hazy opacities.  - Abx as below  - Wean vent as able  - HTS nebs, duonebs  - F/u CXR due to intermittent desaturation and discordant PaO2  and SpO2 to r/o pulmonary edema     RENAL:  #Acute renal failure   #Renal mass s/p biopsy  #Acidosis   Currently w/ anuria. On hemodialysis. Remains hypervolemic.    s/p failed bumex challenge   - Dave  - Trend Cr, uop, lytes  - S/p bicarb drip  - Per nephro, HD session #1 3/24, HD sessions #2 3/25   - Per pathology result of renal biopsy, noninvasive urothelial malignancy       GI:  #Shock liver  Downtrending LFTs. Jaundice with hyperbilirubinemia (direct).   - Trend liver enzymes  - RUQ US showing enlarged periportal and periaortic enlarged lymph nodes and enlarged left lateral liver lobe and CBD 9 mm.     #Diet:  - NPO w/ tube feed     HEMATOLOGIC:  #Sickle cell crisis  #Acute blood loss enamia  Elevated indirect hyperbilirubinemia, likely from  peripheral hemolysis.   - heme following   -D/c deferasirox due to current renal failure     #DVT prophylaxis - SCD    Endo:  #JUAN    ID:  Afebrile. Unchanged leukocytosis. Bullae and desquamation without active signs of infection in LUE.   - C/w aztreonam 2000 mg IV qd ( started 3/22- 3/26)     MSK:  #infiltration  Infiltration of sodium bicarb into left arm, now with arm distension and bullae. Elevated uric acid with nodule suggegstive of podagra however no signs of active gout flare.   -Appreciate orthopedic hand surgery, not compartment syndrome   - CTM    Ethics: Full Code   -Had family meeting with neurology, MICU team; ongoing goals of care conversation   -Appreciate palliative recommendations, will continue to have conversations      Patient is 44y Male with PMHx of sickle cell disease admitted for anemia concerning for sickle cell crisis s/p ureteroscopy w/ L kidney biopsy 3/19, post-operative course c/b acute hemorrhagic shock, PEA arrest with ROSC; currently with acute renal failure on HD and global anoxic brain injury and urothelial cancer and ongoing goals of care.     NEURO:  #Sedated  AAOx0. Not on sedation, having fasciulations in upper body. Guarded prognosis.   - D/c propofol   - D/c fenatnyl   - S/p versed gtt, weaned off 3/23  - Repeat CT B 3/24 showing diffuse sulcal effacement with increased intracrnail pressure, and few patchy foci of cortical blurring   - 3/25 MRIB with diffuse global abnormal supratentorial cortical restricted diffusion consistent with global hypoxic injury     #Seizures  Myoclonus of upper body.   - Witness myoclonic jerking concerning for seizures iso anoxic brain injury  - s/p 2.5g keppra load  - vEEG report: "Abundant myoclonic seizures in the setting of a severe diffuse/multifocal cerebral dysfunction which can be seen in the setting of sedative effect or due to an anoxic injury, with improvement after 20:50 suggesting a lowering of sedation"; will f/u with neuro recommendations  -C/w levetiracetam 500 mg po BID   - Trend enolase   - repeat vEEG 3/24 showing severe cerebral dysfunction     CV:  #Shock - hemorrhagic shock s/p kidney biopsy s/p 5u pRBC, 3 plasma, 3 plt   Hemodynamically stable.   last dose of lovenox AC on 3/18 at 5PM   Hgb 8 -> ~3   - Wean levophed, MAP goal>65  - Monitor cbc q24  -C/w midodrine  20 mg po TID     #Hx of VTE (R IJ thrombus, L brachial DVT)  - CTM, hold AC given hemorrhage  - Bullae present on arm with DVT,  - Appreciate orthopedics hand surgery consult, not compartment syndrome     PULM:  #Acute hypoxic respiratory failure  In the setting of PEA arrest   CXR with R lung field and left mid and lower lung field hazy opacities.  - Abx as below  - Wean vent as able  - HTS nebs, duonebs  - F/u CXR due to intermittent desaturation and discordant PaO2  and SpO2 to r/o pulmonary edema     RENAL:  #Acute renal failure   #Renal mass s/p biopsy  #Acidosis  Oligouric.  On hemodialysis. Remains hypervolemic.    - Dave; TOALFREDO tonight 3/27 PM   - Trend Cr, uop, lytes  - S/p bicarb drip  - Per nephro, HD session #1 3/24, HD sessions #2 3/25, planning sesion #3 3/27  - Per pathology result of renal biopsy, noninvasive urothelial malignancy       GI:  #Shock liver  Downtrending LFTs. Jaundice with hyperbilirubinemia (direct).   - Trend liver enzymes  - RUQ US showing enlarged periportal and periaortic enlarged lymph nodes and enlarged left lateral liver lobe and CBD 9 mm.     #Diet:  - NPO w/ tube feed     HEMATOLOGIC:  #Sickle cell crisis  #Acute blood loss enamia  Elevated indirect hyperbilirubinemia, likely from  peripheral hemolysis.   - heme following   -D/c deferasirox due to current renal failure     #DVT prophylaxis - SCD    Endo:  #JUAN    ID:  Afebrile. Unchanged leukocytosis. Bullae and desquamation without active signs of infection in LUE.   - D/c  aztreonam 2000 mg IV qd ( started 3/22- 3/27)   -initiated meropenem 500 mg IV qd (started 3/27)     MSK:  #infiltration  Infiltration of sodium bicarb into left arm, now with arm distension and bullae. Elevated uric acid with nodule suggegstive of podagra however no signs of active gout flare.   -Appreciate orthopedic hand surgery, not compartment syndrome   - CTM    Ethics: Full Code   -Had family meeting with neurology, MICU team; ongoing goals of care conversation   -Appreciate palliative recommendations, will continue to have conversations

## 2025-03-27 NOTE — PROGRESS NOTE ADULT - SUBJECTIVE AND OBJECTIVE BOX
MICU PROGRESS NOTE     HISTORY OF PRESENT ILLNESS     Overnight, had fever 100.2-102.2. HR 80s-90s. SBPs 90s-100. MV CPAP PEEP 10 Fio2 80$ and PSV 12. Palliative discussed wtih family yesterday.     Seen and examined at bedside, patient remains unable to engage meaningfully.     ROS: All negative except as listed above.    VITAL SIGNS:  ICU Vital Signs Last 24 Hrs  T(C): 37.9 (27 Mar 2025 04:00), Max: 39 (26 Mar 2025 20:00)  T(F): 100.2 (27 Mar 2025 04:00), Max: 102.2 (26 Mar 2025 20:00)  HR: 84 (27 Mar 2025 07:53) (75 - 100)  BP: --  BP(mean): --  ABP: 102/57 (27 Mar 2025 06:00) (86/42 - 137/68)  ABP(mean): 69 (27 Mar 2025 06:00) (55 - 88)  RR: 19 (27 Mar 2025 06:00) (15 - 25)  SpO2: 94% (27 Mar 2025 07:53) (93% - 98%)    O2 Parameters below as of 27 Mar 2025 04:00  Patient On (Oxygen Delivery Method): ventilator    O2 Concentration (%): 80      Mode: CPAP with PS, FiO2: 80, PEEP: 10, PS: 12, MAP: 13, PIP: 23  Plateau pressure:   P/F ratio:     03-26 @ 07:01  -  03-27 @ 07:00  --------------------------------------------------------  IN: 839.6 mL / OUT: 160 mL / NET: 679.6 mL      CAPILLARY BLOOD GLUCOSE          ECG: reviewed.    PHYSICAL EXAM:    General: NAD, normal habitus, mild jaundice   Neuro: AAOx0, fasciculations on L upper extremities, non responsive to noxious stimuli, closed eyes, no verbal sounds, 4 mm pupils, sluggishly reactive pupils b/l   HEENT: icteric sclerae b/l   Chest: nonTTP   Heart: S1/S2, RRR   Lungs: CTA b/l, on MV   Abd: soft, nonTTP, nondistended   Ext: generalized nonpitting edema, LUE with bullae and desquamation along forearm, nodule on R 1st digit proximal phalanges   Pulses: radial 2+ b/l, dorsalis pedis 2+ b/l   Lines/tubes/drains: triple lumen in L femoral, OG tube, L IJV, daniel   Psych: unable to assess     MEDICATIONS:  MEDICATIONS  (STANDING):  allopurinol 50 milliGRAM(s) Oral <User Schedule>  aztreonam  IVPB 2000 milliGRAM(s) IV Intermittent <User Schedule>  chlorhexidine 0.12% Liquid 15 milliLiter(s) Oral Mucosa every 12 hours  chlorhexidine 2% Cloths 1 Application(s) Topical daily  folic acid 1 milliGRAM(s) Oral daily  influenza   Vaccine 0.5 milliLiter(s) IntraMuscular once  levETIRAcetam 500 milliGRAM(s) Oral two times a day  midodrine 20 milliGRAM(s) Oral every 8 hours  norepinephrine Infusion 1 MICROgram(s)/kG/Min (167 mL/Hr) IV Continuous <Continuous>  petrolatum Ophthalmic Ointment 1 Application(s) Both EYES two times a day    MEDICATIONS  (PRN):  midazolam Injectable 2 milliGRAM(s) IV Push every 4 hours PRN tremors      ALLERGIES:  Allergies    penicillin (Pruritus)  hydroxyurea (Other)  piperacillin-tazobactam (Urticaria)  ceftriaxone (Anaphylaxis)    Intolerances        LABS:                        7.9    19.07 )-----------( 241      ( 27 Mar 2025 01:45 )             21.4     03-27    135  |  95[L]  |  71[H]  ----------------------------<  120[H]  4.5   |  21[L]  |  5.04[H]    Ca    9.0      27 Mar 2025 01:45  Phos  6.2     03-27  Mg     2.40     03-27    TPro  6.0  /  Alb  2.6[L]  /  TBili  15.3[H]  /  DBili  13.9  /  AST  100[H]  /  ALT  62[H]  /  AlkPhos  165[H]  03-27    PT/INR - ( 27 Mar 2025 01:45 )   PT: 12.1 sec;   INR: 1.04 ratio         PTT - ( 27 Mar 2025 01:45 )  PTT:31.0 sec  Urinalysis Basic - ( 27 Mar 2025 01:45 )    Color: x / Appearance: x / SG: x / pH: x  Gluc: 120 mg/dL / Ketone: x  / Bili: x / Urobili: x   Blood: x / Protein: x / Nitrite: x   Leuk Esterase: x / RBC: x / WBC x   Sq Epi: x / Non Sq Epi: x / Bacteria: x      ABG:  pH, Arterial: 7.32 (03-27-25 @ 01:45)  pCO2, Arterial: 47 mmHg (03-27-25 @ 01:45)  pO2, Arterial: 121 mmHg (03-27-25 @ 01:45)      vBG:    Micro:    Culture - Blood (collected 03-20-25 @ 10:04)  Source: Blood Blood-Peripheral  Final Report (03-25-25 @ 13:01):    No growth at 5 days        Culture - Sputum (collected 03-26-25 @ 11:45)  Source: ET Tube ET Tube  Gram Stain (03-26-25 @ 22:28):    Few Squamous epithelial cells per low power field    Moderate polymorphonuclear leukocytes per low power field    Few Yeast per oil power field    Culture - Sputum (collected 03-22-25 @ 17:15)  Source: ET Tube ET Tube  Gram Stain (03-22-25 @ 23:42):    Numerous polymorphonuclear leukocytes per low power field    Rare Squamous epithelial cells per low power field    No organisms seen per oil power field  Final Report (03-24-25 @ 06:52):    Commensal lay consistent with body site        RADIOLOGY & ADDITIONAL TESTS: Reviewed. MICU PROGRESS NOTE     HISTORY OF PRESENT ILLNESS     Overnight, had fever 100.2-102.2. HR 80s-90s. SBPs 90s-100. MV CPAP PEEP 10 Fio2 80$ and PSV 12. Palliative discussed wtih family yesterday.     Seen and examined at bedside, patient remains unable to engage meaningfully.     ROS: All negative except as listed above.    VITAL SIGNS:  ICU Vital Signs Last 24 Hrs  T(C): 37.9 (27 Mar 2025 04:00), Max: 39 (26 Mar 2025 20:00)  T(F): 100.2 (27 Mar 2025 04:00), Max: 102.2 (26 Mar 2025 20:00)  HR: 84 (27 Mar 2025 07:53) (75 - 100)  BP: --  BP(mean): --  ABP: 102/57 (27 Mar 2025 06:00) (86/42 - 137/68)  ABP(mean): 69 (27 Mar 2025 06:00) (55 - 88)  RR: 19 (27 Mar 2025 06:00) (15 - 25)  SpO2: 94% (27 Mar 2025 07:53) (93% - 98%)    O2 Parameters below as of 27 Mar 2025 04:00  Patient On (Oxygen Delivery Method): ventilator    O2 Concentration (%): 80      Mode: CPAP with PS, FiO2: 80, PEEP: 10, PS: 12, MAP: 13, PIP: 23  Plateau pressure:   P/F ratio:     03-26 @ 07:01  -  03-27 @ 07:00  --------------------------------------------------------  IN: 839.6 mL / OUT: 160 mL / NET: 679.6 mL      CAPILLARY BLOOD GLUCOSE          ECG: reviewed.    PHYSICAL EXAM:    General: NAD, normal habitus, mild jaundice   Neuro: AAOx0, myoclonus of upper body, non responsive to noxious stimuli, closed eyes, no verbal sounds, 4 mm pupils, sluggishly reactive pupils b/l   HEENT: icteric sclerae b/l   Chest: nonTTP   Heart: S1/S2, RRR   Lungs: CTA b/l, on MV   Abd: soft, nonTTP, nondistended   Ext: generalized nonpitting edema, LUE with bullae and desquamation along forearm, nodule on R 1st digit proximal phalanges   Pulses: radial 2+ b/l, dorsalis pedis 2+ b/l   Lines/tubes/drains: triple lumen in L femoral, OG tube, L IJV, daniel   Psych: unable to assess     MEDICATIONS:  MEDICATIONS  (STANDING):  allopurinol 50 milliGRAM(s) Oral <User Schedule>  aztreonam  IVPB 2000 milliGRAM(s) IV Intermittent <User Schedule>  chlorhexidine 0.12% Liquid 15 milliLiter(s) Oral Mucosa every 12 hours  chlorhexidine 2% Cloths 1 Application(s) Topical daily  folic acid 1 milliGRAM(s) Oral daily  influenza   Vaccine 0.5 milliLiter(s) IntraMuscular once  levETIRAcetam 500 milliGRAM(s) Oral two times a day  midodrine 20 milliGRAM(s) Oral every 8 hours  norepinephrine Infusion 1 MICROgram(s)/kG/Min (167 mL/Hr) IV Continuous <Continuous>  petrolatum Ophthalmic Ointment 1 Application(s) Both EYES two times a day    MEDICATIONS  (PRN):  midazolam Injectable 2 milliGRAM(s) IV Push every 4 hours PRN tremors      ALLERGIES:  Allergies    penicillin (Pruritus)  hydroxyurea (Other)  piperacillin-tazobactam (Urticaria)  ceftriaxone (Anaphylaxis)    Intolerances        LABS:                        7.9    19.07 )-----------( 241      ( 27 Mar 2025 01:45 )             21.4     03-27    135  |  95[L]  |  71[H]  ----------------------------<  120[H]  4.5   |  21[L]  |  5.04[H]    Ca    9.0      27 Mar 2025 01:45  Phos  6.2     03-27  Mg     2.40     03-27    TPro  6.0  /  Alb  2.6[L]  /  TBili  15.3[H]  /  DBili  13.9  /  AST  100[H]  /  ALT  62[H]  /  AlkPhos  165[H]  03-27    PT/INR - ( 27 Mar 2025 01:45 )   PT: 12.1 sec;   INR: 1.04 ratio         PTT - ( 27 Mar 2025 01:45 )  PTT:31.0 sec  Urinalysis Basic - ( 27 Mar 2025 01:45 )    Color: x / Appearance: x / SG: x / pH: x  Gluc: 120 mg/dL / Ketone: x  / Bili: x / Urobili: x   Blood: x / Protein: x / Nitrite: x   Leuk Esterase: x / RBC: x / WBC x   Sq Epi: x / Non Sq Epi: x / Bacteria: x      ABG:  pH, Arterial: 7.32 (03-27-25 @ 01:45)  pCO2, Arterial: 47 mmHg (03-27-25 @ 01:45)  pO2, Arterial: 121 mmHg (03-27-25 @ 01:45)      vBG:    Micro:    Culture - Blood (collected 03-20-25 @ 10:04)  Source: Blood Blood-Peripheral  Final Report (03-25-25 @ 13:01):    No growth at 5 days        Culture - Sputum (collected 03-26-25 @ 11:45)  Source: ET Tube ET Tube  Gram Stain (03-26-25 @ 22:28):    Few Squamous epithelial cells per low power field    Moderate polymorphonuclear leukocytes per low power field    Few Yeast per oil power field    Culture - Sputum (collected 03-22-25 @ 17:15)  Source: ET Tube ET Tube  Gram Stain (03-22-25 @ 23:42):    Numerous polymorphonuclear leukocytes per low power field    Rare Squamous epithelial cells per low power field    No organisms seen per oil power field  Final Report (03-24-25 @ 06:52):    Commensal lay consistent with body site        RADIOLOGY & ADDITIONAL TESTS: Reviewed.

## 2025-03-27 NOTE — PROGRESS NOTE ADULT - ASSESSMENT
Mr. Alex Downey is a 44 year old man w/ PMHx sickle cell disease w/ prior acute chest syndrome, iron overload, Gout, HTN, RUE DVT (was on AC), renal lesion, admitted 3/15/25 sickle cell crisis, received pRBCs and  s/p L ureteroscopy w/ biopsy and stent placement on 3/19. Course has been complicated by PEA arrest 3/20 with unclear down time, possibly ~30 minutes pulseless. Overall data is concerning for poor prognosis. Patient has few CN reflexes intact but otherwise completely unresponsive. 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 MRI brain confirms what is already visible on repeat CT scan which is diffuse cortical injury. EEG with GPD at times appear time-locked with myoclonus, but also with interspersed periods of suppression. NSE is pending, but suspect patient will have profound disability and almost certainly permanent issues with arousal, little to no awareness. At best, could reach minimally conscious state, but more likely is persistent vegetative state vs death secondary to medical complication during coma. It is unlikely that patient will significantly interact with his environment or communicate with family. This was discussed with family at bedside today, who seem to favor palliative extubation, ongoing discussions.          - f/u NSE  -Neurology available for any further discussions with family, though seems to favor palliative extubation already  -continue keppra 750mg q12 (dose reduced due to decreased kidney function), (treating myoclonus)  -minimize sedation

## 2025-03-27 NOTE — PROGRESS NOTE ADULT - PROBLEM SELECTOR PLAN 1
Pt s/p PEA arrest on 3/20. Pt currently intubated.   > MR brain concerning for hypoxic ischemic injury prognosis is guarded at this time and current scan is suggestive of poor neurological recovery and prolonged vegetative state.   > Appreciate Neurology recs- trend enolase, s/p VEEG showed severe cerebral dysfunction   > Patient on keppra 500mg tid   > Patient has been off sedation since 3/26   > Weaned off pressors   > Appreciate MICU care.  > Fevers- tylenol, cooling blankets

## 2025-03-27 NOTE — PROGRESS NOTE ADULT - SUBJECTIVE AND OBJECTIVE BOX
[facility] and Palliative Care  Eve Tavares Palliative Care Attending  Contact Info: Page [phone] (including Nights/Weekends), message on Microsoft Teams (Eve Tavares), or leave  at Palliative Office [phone] (non-urgent)   Date of [id number] @ 13:09    SUBJECTIVE AND OBJECTIVE: Patient remains intubated, off sedation.     Indication for Geriatrics and Palliative Care Services/INTERVAL HPI: complex goc in setting of SCD c/b PEA arrest with anoxic brain injury     OVERNIGHT EVENTS:  > 3/27: Patient febrile overnight s/p cooling blankets and tylenol.     DNR on chart:  Allergies    penicillin (Pruritus)  hydroxyurea (Other)  piperacillin-tazobactam (Urticaria)  ceftriaxone (Anaphylaxis)    Intolerances    MEDICATIONS  (STANDING):  allopurinol 50 milliGRAM(s) Oral <User Schedule>  chlorhexidine 0.12% Liquid 15 milliLiter(s) Oral Mucosa every 12 hours  chlorhexidine 2% Cloths 1 Application(s) Topical daily  folic acid 1 milliGRAM(s) Oral daily  influenza   Vaccine 0.5 milliLiter(s) IntraMuscular once  levETIRAcetam 500 milliGRAM(s) Oral two times a day  meropenem  IVPB 500 milliGRAM(s) IV Intermittent every 24 hours  midodrine 20 milliGRAM(s) Oral every 8 hours  norepinephrine Infusion 1 MICROgram(s)/kG/Min (167 mL/Hr) IV Continuous <Continuous>  petrolatum Ophthalmic Ointment 1 Application(s) Both EYES two times a day    MEDICATIONS  (PRN):  midazolam Injectable 2 milliGRAM(s) IV Push every 4 hours PRN tremors      ITEMS UNCHECKED ARE NOT PRESENT    PRESENT SYMPTOMS: [ x]Unable to self-report - see [ ] CPOT [x] PAINADS [ x] RDOS  Source if other than patient:  [ ]Family   [ ]Team     Pain:  [ ]yes [ ]no  QOL impact -   Location -                    Aggravating factors -  Quality -  Radiation -  Timing-  Severity (0-10 scale):  Minimal acceptable level/ pain goal (0-10 scale):     CPOT:    https://www.[facility].org/getattachment/[email or web address]-8t7c-[email or web address]/Critical-Care-Pain-Observation-Tool-(CPOT)    Dyspnea:                           [ ]Mild [ ]Moderate [ ]Severe  Anxiety:                             [ ]Mild [ ]Moderate [ ]Severe  Fatigue:                             [ ]Mild [ ]Moderate [ ]Severe  Nausea:                             [ ]Mild [ ]Moderate [ ]Severe  Loss of appetite:              [ ]Mild [ ]Moderate [ ]Severe  Constipation:                    [ ]Mild [ ]Moderate [ ]Severe  Other Symptoms:  [ ]All other review of systems negative     PCSSQ[Palliative Care Spiritual Screening Question]   Severity (0-10):  Score of 4 or > indicate consideration of Chaplaincy referral.  Chaplaincy Referral: [ x] yes [ ] refused [ ] following [ ] deferred    Caregiver [place]? : [x ] yes [ ] no [ ] Deferred [ ] Declined             Social work referral [ x] Patient & Family Centered Care Referral [ ]  Anticipatory Grief present?:  [ x] yes [ ] no  [ ] Deferred                  Social work referral [ x] Patient & Family Centered Care Referral [ ]      PHYSICAL EXAM:  Vital Signs Last 24 Hrs  T(C): 37.5 (27 Mar 2025 12:00), Max: 39 (26 Mar 2025 20:00)  T(F): 99.5 (27 Mar 2025 12:00), Max: 102.2 (26 Mar 2025 20:00)  HR: 86 (27 Mar 2025 12:00) (81 - 100)  BP: --  BP(mean): --  RR: 16 (27 Mar 2025 12:00) (15 - 25)  SpO2: 95% (27 Mar 2025 12:00) (93% - 98%)    Parameters below as of 27 Mar 2025 12:00  Patient On (Oxygen Delivery Method): ventilator, PSV 12/10/80%    O2 Concentration (%): 80 I&O's Summary    26 Mar 2025 07:01  -  27 Mar 2025 07:00  --------------------------------------------------------  IN: 839.6 mL / OUT: 160 mL / NET: 679.6 mL    27 Mar 2025 07:01  -  27 Mar 2025 13:09  --------------------------------------------------------  IN: 0 mL / OUT: 5 mL / NET: -5 mL       GENERAL: [ ]Cachexia   Pt intubated and off sedation   [ ]Alert  [ ]Oriented x   [ ]Lethargic  [x ]Unarousable  [ ]Verbal  [ ]Non-Verbal  Behavioral:   [ ] Anxiety  [ ] Delirium [ ] Agitation [x] Other  HEENT:  [ ]Normal   [ ]Dry mouth   [x ]ET Tube/Trach  [ ]Oral lesions  PULMONARY:   [ ]Clear [ ]Tachypnea  [ ]Audible excessive secretions   [ ]Rhonchi        [ ]Right [ ]Left [ ]Bilateral  [ ]Crackles        [ ]Right [ ]Left [ ]Bilateral  [ ]Wheezing     [ ]Right [ ]Left [ ]Bilateral  [x ]Diminished breath sounds [ ]right [ ]left [ ]bilateral  CARDIOVASCULAR:    [x ]Regular [ ]Irregular [ ]Tachy  [ ]Harley [ ]Murmur [ ]Other  GASTROINTESTINAL:  [ ]Soft  [ ]Distended   [ x]+BS  [ ]Non tender [x ]Tender  [ ]Other [ ]PEG [x ]OGT/ NGT  Last BM: 3/27  GENITOURINARY:  [ ]Normal [ ] Incontinent   [ ]Oliguria/Anuria   [ x]Dave  MUSCULOSKELETAL:   [ ]Normal   [ ]Weakness  [x ]Bed/Wheelchair bound [ ]Edema  NEUROLOGIC:   [ ]No focal deficits  [ ]Cognitive impairment  [ ]Dysphagia [ ]Dysarthria [ ]Paresis [x ]Other - anoxic brain injury   SKIN: Please see flowsheets   [ ]Normal  [ ]Rash  [ x]Other  [ ]Pressure ulcer(s)       Present on admission [ ]y [ ]n    CRITICAL CARE:  [ ]Shock Present  [ ]Septic [ ]Cardiogenic [ ]Neurologic [ ]Hypovolemic  [ ]Vasopressors [ ]Inotropes  [ ]Respiratory failure present [ ]Mechanical Ventilation [ ]Non-invasive ventilatory support [ ]High-Flow Mode: CPAP with PS, FiO2: 80, PEEP: 10, PS: 12, MAP: 14, PIP: 23  [ ]Acute  [ ]Chronic [ ]Hypoxic  [ ]Hypercarbic [ ]Other  [ ]Other organ failure     LABS:                        7.9    19.07 )-----------( 241      ( 27 Mar 2025 01:45 )             21.4   03-27    135  |  95[L]  |  71[H]  ----------------------------<  120[H]  4.5   |  21[L]  |  5.04[H]    Ca    9.0      27 Mar 2025 01:45  Phos  6.2     03-27  Mg     2.40     03-27    TPro  6.0  /  Alb  2.6[L]  /  TBili  15.3[H]  /  DBili  13.9  /  AST  100[H]  /  ALT  62[H]  /  AlkPhos  165[H]  03-27  PT/INR - ( 27 Mar 2025 01:45 )   PT: 12.1 sec;   INR: 1.04 ratio         PTT - ( 27 Mar 2025 01:45 )  PTT:31.0 sec    Urinalysis Basic - ( 27 Mar 2025 01:45 )    Color: x / Appearance: x / SG: x / pH: x  Gluc: 120 mg/dL / Ketone: x  / Bili: x / Urobili: x   Blood: x / Protein: x / Nitrite: x   Leuk Esterase: x / RBC: x / WBC x   Sq Epi: x / Non Sq Epi: x / Bacteria: x      RADIOLOGY & ADDITIONAL STUDIES: < from: Xray Chest 1 View- PORTABLE-Urgent (Xray Chest 1 View- PORTABLE-Urgent .) (03.26.25 @ 12:02) >  IMPRESSION:  Tubes and lines in place.  Small layering effusions with increasing consolidation of the right upper   lobe.    < end of copied text >      Protein Calorie Malnutrition Present: [ ]mild [ ]moderate [ ]severe [ ]underweight [ ]morbid obesity  https://www.andeal.org/vault/2440/web/files/ONC/Table_Clinical%20Characteristics%20to%20Document%20Malnutrition-White%20JV%20et%20al%202012.pdf    Height (cm): 175 (03-19-25 @ 10:38), 170.2 (03-12-25 @ 10:17), 175.3 (12-08-24 @ 21:08)  Weight (kg): 88.8 (03-19-25 @ 10:38), 90.7 (03-12-25 @ 10:17), 87.1 (12-08-24 @ 21:08)  BMI (kg/m2): 29 (03-19-25 @ 10:38), 31.3 (03-12-25 @ 10:17), 28.3 (12-08-24 @ 21:08)    [ ]PPSV2 < or = 30%  [ ]significant weight loss [ ]poor nutritional intake [ ]anasarca[ ]Artificial Nutrition    Other REFERRALS:  [ ]Hospice  [ ]Child Life  [ ]Social Work  [ ]Case management [ ]Holistic Therapy    [facility] and Palliative [facility]  Eve Tavares Palliative Care Attending  Contact Info: Page [phone] (including Nights/Weekends), message on Microsoft Teams (Eve Tavares), or leave  at Palliative Office [phone] (non-urgent)   Date of [id number] @ 13:09    SUBJECTIVE AND OBJECTIVE: Patient remains intubated, off sedation. Friend at bedside. Pt unresponsive.     Indication for Geriatrics and Palliative Care Services/INTERVAL HPI: complex goc in setting of SCD c/b PEA arrest with anoxic brain injury     OVERNIGHT EVENTS:  > 3/27: Patient febrile overnight s/p cooling blankets and tylenol.     DNR on chart:  Allergies    penicillin (Pruritus)  hydroxyurea (Other)  piperacillin-tazobactam (Urticaria)  ceftriaxone (Anaphylaxis)    Intolerances    MEDICATIONS  (STANDING):  allopurinol 50 milliGRAM(s) Oral <User Schedule>  chlorhexidine 0.12% Liquid 15 milliLiter(s) Oral Mucosa every 12 hours  chlorhexidine 2% Cloths 1 Application(s) Topical daily  folic acid 1 milliGRAM(s) Oral daily  influenza   Vaccine 0.5 milliLiter(s) IntraMuscular once  levETIRAcetam 500 milliGRAM(s) Oral two times a day  meropenem  IVPB 500 milliGRAM(s) IV Intermittent every 24 hours  midodrine 20 milliGRAM(s) Oral every 8 hours  norepinephrine Infusion 1 MICROgram(s)/kG/Min (167 mL/Hr) IV Continuous <Continuous>  petrolatum Ophthalmic Ointment 1 Application(s) Both EYES two times a day    MEDICATIONS  (PRN):  midazolam Injectable 2 milliGRAM(s) IV Push every 4 hours PRN tremors      ITEMS UNCHECKED ARE NOT PRESENT    PRESENT SYMPTOMS: [ x]Unable to self-report - see [ ] CPOT [x] PAINADS [ x] RDOS  Source if other than patient:  [ ]Family   [ ]Team     Pain:  [ ]yes [ ]no  QOL impact -   Location -                    Aggravating factors -  Quality -  Radiation -  Timing-  Severity (0-10 scale):  Minimal acceptable level/ pain goal (0-10 scale):     CPOT:    https://www.[facility].org/getattachment/[email or web address]-[email or web address]-6357e7777i5e/Critical-Care-Pain-Observation-Tool-(CPOT)    Dyspnea:                           [ ]Mild [ ]Moderate [ ]Severe  Anxiety:                             [ ]Mild [ ]Moderate [ ]Severe  Fatigue:                             [ ]Mild [ ]Moderate [ ]Severe  Nausea:                             [ ]Mild [ ]Moderate [ ]Severe  Loss of appetite:              [ ]Mild [ ]Moderate [ ]Severe  Constipation:                    [ ]Mild [ ]Moderate [ ]Severe  Other Symptoms:  [ ]All other review of systems negative     PCSSQ[Palliative Care Spiritual Screening Question]   Severity (0-10):  Score of 4 or > indicate consideration of Chaplaincy referral.  Chaplaincy Referral: [ x] yes [ ] refused [ ] following [ ] deferred    Caregiver [place]? : [x ] yes [ ] no [ ] Deferred [ ] Declined             Social work referral [ x] Patient & Family Centered Care Referral [ ]  Anticipatory Grief present?:  [ x] yes [ ] no  [ ] Deferred                  Social work referral [ x] Patient & Family Centered Care Referral [ ]      PHYSICAL EXAM:  Vital Signs Last 24 Hrs  T(C): 37.5 (27 Mar 2025 12:00), Max: 39 (26 Mar 2025 20:00)  T(F): 99.5 (27 Mar 2025 12:00), Max: 102.2 (26 Mar 2025 20:00)  HR: 86 (27 Mar 2025 12:00) (81 - 100)  BP: --  BP(mean): --  RR: 16 (27 Mar 2025 12:00) (15 - 25)  SpO2: 95% (27 Mar 2025 12:00) (93% - 98%)    Parameters below as of 27 Mar 2025 12:00  Patient On (Oxygen Delivery Method): ventilator, PSV 12/10/80%    O2 Concentration (%): 80 I&O's Summary    26 Mar 2025 07:01  -  27 Mar 2025 07:00  --------------------------------------------------------  IN: 839.6 mL / OUT: 160 mL / NET: 679.6 mL    27 Mar 2025 07:01  -  27 Mar 2025 13:09  --------------------------------------------------------  IN: 0 mL / OUT: 5 mL / NET: -5 mL       GENERAL: [ ]Cachexia   Pt intubated and off sedation   [ ]Alert  [ ]Oriented x   [ ]Lethargic  [x ]Unarousable  [ ]Verbal  [ ]Non-Verbal  Behavioral:   [ ] Anxiety  [ ] Delirium [ ] Agitation [x] Other  HEENT:  [ ]Normal   [ ]Dry mouth   [x ]ET Tube/Trach  [ ]Oral lesions  PULMONARY:   [ ]Clear [ ]Tachypnea  [ ]Audible excessive secretions   [ ]Rhonchi        [ ]Right [ ]Left [ ]Bilateral  [ ]Crackles        [ ]Right [ ]Left [ ]Bilateral  [ ]Wheezing     [ ]Right [ ]Left [ ]Bilateral  [x ]Diminished breath sounds [ ]right [ ]left [ ]bilateral  CARDIOVASCULAR:    [x ]Regular [ ]Irregular [ ]Tachy  [ ]Harley [ ]Murmur [ ]Other  GASTROINTESTINAL:  [ ]Soft  [ ]Distended   [ x]+BS  [ ]Non tender [x ]Tender  [ ]Other [ ]PEG [x ]OGT/ NGT  Last BM: 3/27  GENITOURINARY:  [ ]Normal [ ] Incontinent   [ ]Oliguria/Anuria   [ x]Dave  MUSCULOSKELETAL:   [ ]Normal   [ ]Weakness  [x ]Bed/Wheelchair bound [ ]Edema  NEUROLOGIC:   [ ]No focal deficits  [ ]Cognitive impairment  [ ]Dysphagia [ ]Dysarthria [ ]Paresis [x ]Other - anoxic brain injury   SKIN: Please see flowsheets   [ ]Normal  [ ]Rash  [ x]Other  [ ]Pressure ulcer(s)       Present on admission [ ]y [ ]n    CRITICAL CARE:  [ ]Shock Present  [ ]Septic [ ]Cardiogenic [ ]Neurologic [ ]Hypovolemic  [ ]Vasopressors [ ]Inotropes  [ ]Respiratory failure present [ ]Mechanical Ventilation [ ]Non-invasive ventilatory support [ ]High-Flow Mode: CPAP with PS, FiO2: 80, PEEP: 10, PS: 12, MAP: 14, PIP: 23  [ ]Acute  [ ]Chronic [ ]Hypoxic  [ ]Hypercarbic [ ]Other  [ ]Other organ failure     LABS:                        7.9    19.07 )-----------( 241      ( 27 Mar 2025 01:45 )             21.4   03-27    135  |  95[L]  |  71[H]  ----------------------------<  120[H]  4.5   |  21[L]  |  5.04[H]    Ca    9.0      27 Mar 2025 01:45  Phos  6.2     03-27  Mg     2.40     03-27    TPro  6.0  /  Alb  2.6[L]  /  TBili  15.3[H]  /  DBili  13.9  /  AST  100[H]  /  ALT  62[H]  /  AlkPhos  165[H]  03-27  PT/INR - ( 27 Mar 2025 01:45 )   PT: 12.1 sec;   INR: 1.04 ratio         PTT - ( 27 Mar 2025 01:45 )  PTT:31.0 sec    Urinalysis Basic - ( 27 Mar 2025 01:45 )    Color: x / Appearance: x / SG: x / pH: x  Gluc: 120 mg/dL / Ketone: x  / Bili: x / Urobili: x   Blood: x / Protein: x / Nitrite: x   Leuk Esterase: x / RBC: x / WBC x   Sq Epi: x / Non Sq Epi: x / Bacteria: x      RADIOLOGY & ADDITIONAL STUDIES: < from: Xray Chest 1 View- PORTABLE-Urgent (Xray Chest 1 View- PORTABLE-Urgent .) (03.26.25 @ 12:02) >  IMPRESSION:  Tubes and lines in place.  Small layering effusions with increasing consolidation of the right upper   lobe.    < end of copied text >      Protein Calorie Malnutrition Present: [ ]mild [ ]moderate [ ]severe [ ]underweight [ ]morbid obesity  https://www.andeal.org/vault/2440/web/files/ONC/Table_Clinical%20Characteristics%20to%20Document%20Malnutrition-White%20JV%20et%20al%202012.pdf    Height (cm): 175 (03-19-25 @ 10:38), 170.2 (03-12-25 @ 10:17), 175.3 (12-08-24 @ 21:08)  Weight (kg): 88.8 (03-19-25 @ 10:38), 90.7 (03-12-25 @ 10:17), 87.1 (12-08-24 @ 21:08)  BMI (kg/m2): 29 (03-19-25 @ 10:38), 31.3 (03-12-25 @ 10:17), 28.3 (12-08-24 @ 21:08)    [x ]PPSV2 < or = 30%  [ ]significant weight loss [ ]poor nutritional intake [ ]anasarca[ ]Artificial Nutrition    Other REFERRALS:  [ ]Hospice  [ ]Child Life  [ ]Social Work  [ ]Case management [ ]Holistic Therapy

## 2025-03-27 NOTE — PROGRESS NOTE ADULT - ATTENDING COMMENTS
s/p diuretic trial. oliguric  FIO2 80%. plan for HD today  anoxic injury. neuro following  family at bedside  discussed with MICU

## 2025-03-27 NOTE — PROGRESS NOTE ADULT - SUBJECTIVE AND OBJECTIVE BOX
[facility] Division of Kidney Diseases & Hypertension  FOLLOW UP NOTE  [phone]--------------------------------------------------------------------------------  Chief Complaint: MATA on HD    24 hour events/subjective: Pt seen and examined this morning in MICU. Pt intubated/sedated and on IV vasopressor. Unable to obtain history/ROS.     PAST HISTORY  --------------------------------------------------------------------------------  No significant changes to PMH, PSH, FHx, SHx, unless otherwise noted    ALLERGIES & MEDICATIONS  --------------------------------------------------------------------------------  Allergies    penicillin (Pruritus)  hydroxyurea (Other)  piperacillin-tazobactam (Urticaria)  ceftriaxone (Anaphylaxis)    Intolerances    Standing Inpatient Medications  allopurinol 50 milliGRAM(s) Oral <User Schedule>  aztreonam  IVPB 2000 milliGRAM(s) IV Intermittent <User Schedule>  chlorhexidine 0.12% Liquid 15 milliLiter(s) Oral Mucosa every 12 hours  chlorhexidine 2% Cloths 1 Application(s) Topical daily  folic acid 1 milliGRAM(s) Oral daily  influenza   Vaccine 0.5 milliLiter(s) IntraMuscular once  levETIRAcetam 500 milliGRAM(s) Oral two times a day  midodrine 20 milliGRAM(s) Oral every 8 hours  norepinephrine Infusion 1 MICROgram(s)/kG/Min IV Continuous <Continuous>  petrolatum Ophthalmic Ointment 1 Application(s) Both EYES two times a day    PRN Inpatient Medications  midazolam Injectable 2 milliGRAM(s) IV Push every 4 hours PRN    REVIEW OF SYSTEMS  --------------------------------------------------------------------------------  see above    VITALS/PHYSICAL EXAM  --------------------------------------------------------------------------------  T(C): 37.9 (03-27-25 @ 04:00), Max: 39 (03-26-25 @ 20:00)  HR: 84 (03-27-25 @ 07:53) (75 - 100)  BP: --  RR: 19 (03-27-25 @ 06:00) (15 - 25)  SpO2: 94% (03-27-25 @ 07:53) (93% - 98%)  Wt(kg): --    03-26-25 @ 07:01  -  03-27-25 @ 07:00  --------------------------------------------------------  IN: 839.6 mL / OUT: 160 mL / NET: 679.6 mL    Physical Exam:  	Gen: Sedated  	HEENT: +ETT  	Pulm: Mechanical breath sounds, FIO2 90%  	CV: S1S2  	Abd: Soft, +BS               : +daniel catheter w/ minimal urine in bag   	Ext: +LE edema B/L  	Neuro: Sedate  	Skin: Warm and dry  	Vascular access: [facility]    LABS/STUDIES  --------------------------------------------------------------------------------              7.9    19.07 >-----------<  241      [03-27-25 @ 01:45]              21.4     135  |  95  |  71  ----------------------------<  120      [03-27-25 @ 01:45]  4.5   |  21  |  5.04        Ca     9.0     [03-27-25 @ 01:45]      iCa    1.15     [03-27 @ 01:45]      Mg     2.40     [03-27-25 @ 01:45]      Phos  6.2     [03-27-25 @ 01:45]    TPro  6.0  /  Alb  2.6  /  TBili  15.3  /  DBili  13.9  /  AST  100  /  ALT  62  /  AlkPhos  165  [03-27-25 @ 01:45]    PT/INR: PT 12.1 , INR 1.04       [03-27-25 @ 01:45]  PTT: 31.0       [03-27-25 @ 01:45]    Uric acid 6.9      [03-27-25 @ 01:45]        [03-27-25 @ 01:45]    Creatinine Trend:  SCr 5.04 [03-27 @ 01:45]  SCr 3.85 [03-26 @ 01:25]  SCr 4.62 [03-25 @ 00:48]  SCr 6.41 [03-24 @ 16:51]  SCr 5.93 [03-24 @ 08:00]    Lipid: chol --, , HDL --, LDL --      [03-24-25 @ 18:00]

## 2025-03-27 NOTE — PROGRESS NOTE ADULT - NS ATTEND AMEND GEN_ALL_CORE FT
agree with above   unfortunately anoxic brain injury suspected   family at bedside  pall care following

## 2025-03-27 NOTE — PROGRESS NOTE ADULT - ASSESSMENT
This is a 44 year old male with sickle cell disease who presents with low hemoglobin.  s/p left renal bx with stent placement 3/19    1. Sickle cell anemia  -- Baseline hemoglobin outpatient ~6.0, sent in for hemoglobin 5.3   -- Labs consistent with hemolysis- further worsened bili/LFTs due to shock liver   -- CXR neg. CTA chest w/o PE though RUL opacity.   -- Continue with folic acid, IV fluids as needed   -- Monitor CBC and transfuse to maintain hg >6. s/p multiple units of blood this admission prior to OR and after OR due to bleed     2. R IJ DVT   -- AC discontinued as pt with bleed    3. Urothelial carcinoma    -- Prior imaging with concern for urothelial malignancy   -- s/p Lureteroscopy, RGP, biopsy, stent placement L kidney wash, L kidney lower pole mass biopsy 3/19, path shows urothelial carcinoma   -- If pt able to make a recovery, can discuss path and treatment options outpatient, though unlikely    4. Hemorrhagic shock   -- Post-op hemoglobin dropped to 3.4  -- CT showed moderate size left perinephric hematoma tracking into the pelvis. Likely associated small amount of left subcapsular hemorrhage  -- Currently pt in kidney failure on HD, with shock liver and global anoxic brain injury   -- Intubated, on pressors   -- Appreciate care per ICU   -- Palliative care team following, [facility] discussions ongoing as pt has not made any meaningful recovery yet     Will continue to follow.    Liss Chan PA-C  Hematology/Oncology  [facility]  [phone] (office)  [phone] (alt office)

## 2025-03-27 NOTE — PROGRESS NOTE ADULT - ATTENDING COMMENTS
44 year old male with a history of SSD, UE DVT, gout, HTN, with L renal mass admitted with acute anemia/ sickle cell crisis and evaluation of his left renal mass s/p left ureteroscopy and biopsy (3/19/25) with 24 hour post operative course complicated by rapid responses for hypoglycemia c/b cardiac arrest x 4 minutes CPR/1 epi with ROSC, transferred to MICU for further care     Post rosc found to be profoundly anemic, acidemic, with multiorgan dysfunction overall concerning for acute hemorrhagic shock.   Now with concerns for anoxia. CTH with loss of grey white differentiation. Repeat CT again with diffuse loss of grey white differentiation.    MRI showing anoxia. 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     # Cardiac arrest  # Anoxic encephalopathy  # SZ vs myoclonus  # Shock, on pressors  # Anemia  #SSD  # Transaminitis  # MATA 2/2 to ATN  # DVT  # Hyperbilirubinemia, Early cirrhosis?  # Aspiraiton pneumonia  # Urothelial Cancer.   - Cardiac arrest, short down time but with likely anoxic injury and multiorgan dysfunction in setting of hemorrage, time of arrest Hg was 3 and hypoglycemia  - Repeat CT and MRI  diffuse anoxia. NSE pending. Neuro following for possible sz vs myoclonus.  - Wean sedation as tolerated to better assess mental status  - Shock, likely vasoplegia, wean as tolerated. added midodrine  - acute hypoxemic respiratory failure 2/2 to aspiration pneumonia, mucus plugging. check CXR, c/w airway clearance.   - Transfuse PRN, now s/p embolization by IR  - SSD, monitor hemolysis labs. SS labs  - Shocked liver, but with LFT elevation even prior. Mostly direct bilirubin,, Early cirrhosis on US. Trend bili  - MATA. s/p HD. HD per renal.  - DVT no A/C 2/2 to bleeding  - on Aztrenonam and flagyl, d/c flagyl for now. F/U with cultures Will treat for 5 days.  - Hand lesions. Initially consulted vascular now vascular recommending plastics/hand.   - Biopsy showing Non-invasive papillary urothelial carcinoma. Further management based on going GOC.   - DVT ppx- SCD  - Dispo- full code. D/W family findings of the MRI, pathology findings, clinical status for renal failure and acute hypoxemic respiratory failure. Ongoing GOC pending. Palliative care consult placed. Prognosis is grim. 44 year old male with a history of SSD, UE DVT, gout, HTN, with L renal mass admitted with acute anemia/ sickle cell crisis and evaluation of his left renal mass s/p left ureteroscopy and biopsy (3/19/25) with 24 hour post operative course complicated by rapid responses for hypoglycemia c/b cardiac arrest x 4 minutes CPR/1 epi with ROSC, transferred to MICU for further care     Post rosc found to be profoundly anemic, acidemic, with multiorgan dysfunction overall concerning for acute hemorrhagic shock.   Now with concerns for anoxia. CTH with loss of grey white differentiation. Repeat CT again with diffuse loss of grey white differentiation.    MRI showing anoxia. 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     # Cardiac arrest  # Anoxic encephalopathy  # SZ vs myoclonus  # Shock, on pressors  # Anemia  #SSD  # Transaminitis  # MATA 2/2 to ATN  # DVT  # Hyperbilirubinemia, Early cirrhosis?  # Aspiraiton pneumonia  # Urothelial Cancer.   - Cardiac arrest, short down time but with likely anoxic injury and multiorgan dysfunction in setting of hemorrage, time of arrest Hg was 3 and hypoglycemia  - Repeat CT and MRI  diffuse anoxia. NSE pending. Clinically doing poorly. Simulation myoclonus.   - Wean sedation as tolerated to better assess mental status.   - Shock, likely vasoplegia, wean as tolerated. added midodrine  - acute hypoxemic respiratory failure 2/2 to aspiration pneumonia, mucus plugging. , c/w airway clearance.   - Transfuse PRN, now s/p embolization by IR  - SSD, monitor hemolysis labs. SS labs  - Shocked liver, but with LFT elevation even prior. Mostly direct bilirubin,, Early cirrhosis on US. Trend bili  - MATA. s/p HD. HD per renal.  - DVT no A/C 2/2 to bleeding  - Will change to casey givnen fevers worsening hypoxemia. F/U with repeat cultures.   - Hand lesions. Initially consulted vascular now vascular recommending plastics/hand--> no surgical intervetions.   - Biopsy showing Non-invasive papillary urothelial carcinoma. Further management based on going GOC.   - DVT ppx- SCD  - Dispo- full code. D/W family findings of the MRI, pathology findings, clinical status for renal failure and acute hypoxemic respiratory failure. Ongoing GOC pending. Palliative care consult placed. Prognosis is grim. Family requesting several days to think before next steps.

## 2025-03-27 NOTE — PROGRESS NOTE ADULT - PROBLEM SELECTOR PLAN 5
FULL CODE - see separate goc note   Patient's brother, Bala and sister, Radha are patient's surrogate decision makers. Pt's cousin, Donal, is also very involved in patient's care. Family appears to make decisions together.    Caregiver SW referral   Chaplaincy referral- . FULL CODE - see separate goc note   Patient's brother, Bala and sister, Radha are patient's surrogate decision makers. Pt's cousin, Donal, is also very involved in patient's care. Family appears to make decisions together.    Caregiver SW referral   Chaplaincy referral- .  > 3/27: Multiple friends and family coming to visit patient. Per discussion with MICU team, family needs more time before making any further decisions. Emotional support provided to friends/family at bedside.

## 2025-03-28 LAB
ALBUMIN SERPL ELPH-MCNC: 2.6 G/DL — LOW (ref 3.3–5)
ALP SERPL-CCNC: 177 U/L — HIGH (ref 40–120)
ALT FLD-CCNC: 48 U/L — HIGH (ref 4–41)
ANION GAP SERPL CALC-SCNC: 18 MMOL/L — HIGH (ref 7–14)
ANISOCYTOSIS BLD QL: SLIGHT — SIGNIFICANT CHANGE UP
APTT BLD: 29.3 SEC — SIGNIFICANT CHANGE UP (ref 24.5–35.6)
AST SERPL-CCNC: 89 U/L — HIGH (ref 4–40)
BASOPHILS # BLD AUTO: 0.21 K/UL — HIGH (ref 0–0.2)
BASOPHILS NFR BLD AUTO: 0.9 % — SIGNIFICANT CHANGE UP (ref 0–2)
BILIRUB SERPL-MCNC: 14.5 MG/DL — HIGH (ref 0.2–1.2)
BLD GP AB SCN SERPL QL: NEGATIVE — SIGNIFICANT CHANGE UP
BUN SERPL-MCNC: 59 MG/DL — HIGH (ref 7–23)
CALCIUM SERPL-MCNC: 9.1 MG/DL — SIGNIFICANT CHANGE UP (ref 8.4–10.5)
CHLORIDE SERPL-SCNC: 95 MMOL/L — LOW (ref 98–107)
CK SERPL-CCNC: 69 U/L — SIGNIFICANT CHANGE UP (ref 30–200)
CO2 SERPL-SCNC: 22 MMOL/L — SIGNIFICANT CHANGE UP (ref 22–31)
CREAT SERPL-MCNC: 4.39 MG/DL — HIGH (ref 0.5–1.3)
CULTURE RESULTS: ABNORMAL
EGFR: 16 ML/MIN/1.73M2 — LOW
EGFR: 16 ML/MIN/1.73M2 — LOW
EOSINOPHIL # BLD AUTO: 0.4 K/UL — SIGNIFICANT CHANGE UP (ref 0–0.5)
EOSINOPHIL NFR BLD AUTO: 1.7 % — SIGNIFICANT CHANGE UP (ref 0–6)
GAS PNL BLDA: SIGNIFICANT CHANGE UP
GIANT PLATELETS BLD QL SMEAR: PRESENT — SIGNIFICANT CHANGE UP
GLUCOSE SERPL-MCNC: 114 MG/DL — HIGH (ref 70–99)
HAPTOGLOB SERPL-MCNC: <20 MG/DL — LOW (ref 34–200)
HCT VFR BLD CALC: 23 % — LOW (ref 39–50)
HGB BLD-MCNC: 7.6 G/DL — LOW (ref 13–17)
IANC: 18.31 K/UL — HIGH (ref 1.8–7.4)
INR BLD: 1.05 RATIO — SIGNIFICANT CHANGE UP (ref 0.85–1.16)
LDH SERPL L TO P-CCNC: 851 U/L — HIGH (ref 135–225)
LYMPHOCYTES # BLD AUTO: 0.4 K/UL — LOW (ref 1–3.3)
LYMPHOCYTES # BLD AUTO: 1.7 % — LOW (ref 13–44)
MAGNESIUM SERPL-MCNC: 2.2 MG/DL — SIGNIFICANT CHANGE UP (ref 1.6–2.6)
MCHC RBC-ENTMCNC: 29.6 PG — SIGNIFICANT CHANGE UP (ref 27–34)
MCHC RBC-ENTMCNC: 33 G/DL — SIGNIFICANT CHANGE UP (ref 32–36)
MCV RBC AUTO: 79 FL — LOW (ref 80–100)
MICROCYTES BLD QL: SLIGHT — SIGNIFICANT CHANGE UP
MONOCYTES # BLD AUTO: 1.83 K/UL — HIGH (ref 0–0.9)
MONOCYTES NFR BLD AUTO: 7.8 % — SIGNIFICANT CHANGE UP (ref 2–14)
NEUTROPHILS # BLD AUTO: 20.4 K/UL — HIGH (ref 1.8–7.4)
NEUTROPHILS NFR BLD AUTO: 87 % — HIGH (ref 43–77)
NRBC # BLD: 1 /100 WBCS — HIGH (ref 0–0)
NRBC BLD-RTO: 1 /100 WBCS — HIGH (ref 0–0)
OVALOCYTES BLD QL SMEAR: SLIGHT — SIGNIFICANT CHANGE UP
PHOSPHATE SERPL-MCNC: 5.4 MG/DL — HIGH (ref 2.5–4.5)
PLAT MORPH BLD: NORMAL — SIGNIFICANT CHANGE UP
PLATELET # BLD AUTO: 285 K/UL — SIGNIFICANT CHANGE UP (ref 150–400)
PLATELET COUNT - ESTIMATE: NORMAL — SIGNIFICANT CHANGE UP
POIKILOCYTOSIS BLD QL AUTO: SLIGHT — SIGNIFICANT CHANGE UP
POTASSIUM SERPL-MCNC: 3.9 MMOL/L — SIGNIFICANT CHANGE UP (ref 3.5–5.3)
POTASSIUM SERPL-SCNC: 3.9 MMOL/L — SIGNIFICANT CHANGE UP (ref 3.5–5.3)
PROT SERPL-MCNC: 6.2 G/DL — SIGNIFICANT CHANGE UP (ref 6–8.3)
PROTHROM AB SERPL-ACNC: 12.2 SEC — SIGNIFICANT CHANGE UP (ref 9.9–13.4)
RBC # BLD: 2.57 M/UL — LOW (ref 4.2–5.8)
RBC # BLD: 2.57 M/UL — LOW (ref 4.2–5.8)
RBC # FLD: 18.3 % — HIGH (ref 10.3–14.5)
RBC BLD AUTO: ABNORMAL
RETICS #: 152.1 K/UL — HIGH (ref 25–125)
RETICS/RBC NFR: 5.9 % — HIGH (ref 0.5–2.5)
RH IG SCN BLD-IMP: POSITIVE — SIGNIFICANT CHANGE UP
SODIUM SERPL-SCNC: 135 MMOL/L — SIGNIFICANT CHANGE UP (ref 135–145)
SPECIMEN SOURCE: SIGNIFICANT CHANGE UP
TARGETS BLD QL SMEAR: SIGNIFICANT CHANGE UP
VARIANT LYMPHS # BLD: 0.9 % — SIGNIFICANT CHANGE UP (ref 0–6)
VARIANT LYMPHS NFR BLD MANUAL: 0.9 % — SIGNIFICANT CHANGE UP (ref 0–6)
WBC # BLD: 23.45 K/UL — HIGH (ref 3.8–10.5)
WBC # FLD AUTO: 23.45 K/UL — HIGH (ref 3.8–10.5)

## 2025-03-28 PROCEDURE — 99291 CRITICAL CARE FIRST HOUR: CPT | Mod: GC

## 2025-03-28 PROCEDURE — 99233 SBSQ HOSP IP/OBS HIGH 50: CPT | Mod: GC

## 2025-03-28 PROCEDURE — 99232 SBSQ HOSP IP/OBS MODERATE 35: CPT

## 2025-03-28 RX ORDER — FENTANYL CITRATE-0.9 % NACL/PF 100MCG/2ML
100 SYRINGE (ML) INTRAVENOUS ONCE
Refills: 0 | Status: DISCONTINUED | OUTPATIENT
Start: 2025-03-28 | End: 2025-03-28

## 2025-03-28 RX ORDER — MUPIROCIN CALCIUM 20 MG/G
1 CREAM TOPICAL EVERY 12 HOURS
Refills: 0 | Status: DISCONTINUED | OUTPATIENT
Start: 2025-03-28 | End: 2025-04-03

## 2025-03-28 RX ORDER — MIDODRINE HYDROCHLORIDE 5 MG/1
20 TABLET ORAL EVERY 8 HOURS
Refills: 0 | Status: DISCONTINUED | OUTPATIENT
Start: 2025-03-28 | End: 2025-03-30

## 2025-03-28 RX ORDER — MIDAZOLAM IN 0.9 % SOD.CHLORID 1 MG/ML
2 PLASTIC BAG, INJECTION (ML) INTRAVENOUS ONCE
Refills: 0 | Status: DISCONTINUED | OUTPATIENT
Start: 2025-03-28 | End: 2025-03-28

## 2025-03-28 RX ORDER — LEVETIRACETAM 10 MG/ML
500 INJECTION, SOLUTION INTRAVENOUS
Refills: 0 | Status: DISCONTINUED | OUTPATIENT
Start: 2025-03-28 | End: 2025-04-11

## 2025-03-28 RX ORDER — MIDODRINE HYDROCHLORIDE 5 MG/1
20 TABLET ORAL EVERY 8 HOURS
Refills: 0 | Status: DISCONTINUED | OUTPATIENT
Start: 2025-03-28 | End: 2025-03-28

## 2025-03-28 RX ORDER — ACETAMINOPHEN 500 MG/5ML
1000 LIQUID (ML) ORAL ONCE
Refills: 0 | Status: COMPLETED | OUTPATIENT
Start: 2025-03-28 | End: 2025-03-28

## 2025-03-28 RX ORDER — FENTANYL CITRATE-0.9 % NACL/PF 100MCG/2ML
50 SYRINGE (ML) INTRAVENOUS ONCE
Refills: 0 | Status: DISCONTINUED | OUTPATIENT
Start: 2025-03-28 | End: 2025-03-28

## 2025-03-28 RX ADMIN — Medication 100 MICROGRAM(S): at 04:45

## 2025-03-28 RX ADMIN — Medication 100 MICROGRAM(S): at 10:00

## 2025-03-28 RX ADMIN — Medication 100 MICROGRAM(S): at 09:30

## 2025-03-28 RX ADMIN — Medication 1 APPLICATION(S): at 17:20

## 2025-03-28 RX ADMIN — Medication 50 MICROGRAM(S): at 01:20

## 2025-03-28 RX ADMIN — Medication 2 MILLIGRAM(S): at 06:23

## 2025-03-28 RX ADMIN — LEVETIRACETAM 500 MILLIGRAM(S): 10 INJECTION, SOLUTION INTRAVENOUS at 05:47

## 2025-03-28 RX ADMIN — Medication 100 MICROGRAM(S): at 02:34

## 2025-03-28 RX ADMIN — Medication 100 MICROGRAM(S): at 04:15

## 2025-03-28 RX ADMIN — Medication 15 MILLILITER(S): at 05:47

## 2025-03-28 RX ADMIN — Medication 2 MILLIGRAM(S): at 02:04

## 2025-03-28 RX ADMIN — Medication 1 APPLICATION(S): at 11:24

## 2025-03-28 RX ADMIN — Medication 2 MILLIGRAM(S): at 18:41

## 2025-03-28 RX ADMIN — Medication 2 MILLIGRAM(S): at 21:38

## 2025-03-28 RX ADMIN — Medication 400 MILLIGRAM(S): at 05:48

## 2025-03-28 RX ADMIN — FOLIC ACID 1 MILLIGRAM(S): 1 TABLET ORAL at 11:23

## 2025-03-28 RX ADMIN — Medication 1000 MILLIGRAM(S): at 22:49

## 2025-03-28 RX ADMIN — Medication 100 MICROGRAM(S): at 23:17

## 2025-03-28 RX ADMIN — Medication 400 MILLIGRAM(S): at 22:19

## 2025-03-28 RX ADMIN — Medication 15 MILLILITER(S): at 17:20

## 2025-03-28 RX ADMIN — Medication 50 MICROGRAM(S): at 00:50

## 2025-03-28 RX ADMIN — Medication 1 APPLICATION(S): at 05:47

## 2025-03-28 RX ADMIN — MIDODRINE HYDROCHLORIDE 20 MILLIGRAM(S): 5 TABLET ORAL at 01:37

## 2025-03-28 RX ADMIN — Medication 100 MICROGRAM(S): at 02:04

## 2025-03-28 RX ADMIN — MUPIROCIN CALCIUM 1 APPLICATION(S): 20 CREAM TOPICAL at 17:20

## 2025-03-28 RX ADMIN — MEROPENEM 100 MILLIGRAM(S): 1 INJECTION INTRAVENOUS at 11:24

## 2025-03-28 RX ADMIN — Medication 50 MILLIGRAM(S): at 11:24

## 2025-03-28 RX ADMIN — Medication 100 MICROGRAM(S): at 19:10

## 2025-03-28 RX ADMIN — Medication 100 MICROGRAM(S): at 23:47

## 2025-03-28 RX ADMIN — LEVETIRACETAM 500 MILLIGRAM(S): 10 INJECTION, SOLUTION INTRAVENOUS at 17:22

## 2025-03-28 RX ADMIN — Medication 1000 MILLIGRAM(S): at 06:18

## 2025-03-28 NOTE — PROGRESS NOTE ADULT - PROBLEM SELECTOR PLAN 5
FULL CODE - see separate goc note   Patient's brother, Bala and sister, Radha are patient's surrogate decision makers. Pt's cousin, Donal, is also very involved in patient's care. Family appears to make decisions together.    Caregiver SW referral   Chaplaincy referral- .  > 3/27: Multiple friends and family coming to visit patient. Per discussion with MICU team, family needs more time before making any further decisions. Emotional support provided to friends/family at bedside.  > 3/28: Only cousins at bedside. Emotional support provided. MICU team notified palliative team about potential family meeting next week so that the family can have the weekend to think about next steps.

## 2025-03-28 NOTE — PROGRESS NOTE ADULT - ATTENDING COMMENTS
44 year old male with a history of SSD, UE DVT, gout, HTN, with L renal mass admitted with acute anemia/ sickle cell crisis and evaluation of his left renal mass s/p left ureteroscopy and biopsy (3/19/25) with 24 hour post operative course complicated by rapid responses for hypoglycemia c/b cardiac arrest x 4 minutes CPR/1 epi with ROSC, transferred to MICU for further care     Post rosc found to be profoundly anemic, acidemic, with multiorgan dysfunction overall concerning for acute hemorrhagic shock.   Now with concerns for anoxia. CTH with loss of grey white differentiation. Repeat CT again with diffuse loss of grey white differentiation.    MRI showing anoxia. 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     # Cardiac arrest  # Anoxic encephalopathy  # SZ vs myoclonus  # Shock, on pressors  # Anemia  #SSD  # Transaminitis  # MATA 2/2 to ATN  # DVT  # Hyperbilirubinemia, Early cirrhosis?  # Aspiraiton pneumonia  # Urothelial Cancer.   - Cardiac arrest, short down time but with likely anoxic injury and multiorgan dysfunction in setting of hemorrage, time of arrest Hg was 3 and hypoglycemia   - Repeat CT and MRI showing diffuse anoxia. Clinically doing poorly. Simulation myoclonus.   - Remain off sedation as tolerated.   - Shock, likely vasoplegia, wean as tolerated. added midodrine  - acute hypoxemic respiratory failure 2/2 to aspiration pneumonia, mucus plugging. , c/w airway clearance.   - Transfuse PRN, now s/p embolization by IR  - SSD, monitor hemolysis labs. SS labs  - Shocked liver, but with LFT elevation even prior. Mostly direct bilirubin,, Early cirrhosis on US. Trend bili  - MATA. s/p HD. HD per renal.  - DVT no A/C 2/2 to bleeding  - Will change to casey givnen fevers worsening hypoxemia. F/U with repeat cultures.   - Hand lesions. Initially consulted vascular now vascular recommending plastics/hand--> no surgical interventions.   - Biopsy showing Non-invasive papillary urothelial carcinoma. Further management based on going GOC.   - DVT ppx- SCD  - Dispo- full code. D/W family findings of the MRI, pathology findings, clinical status for renal failure and acute hypoxemic respiratory failure. Ongoing GOC pending. Palliative care consult placed. Prognosis is grim. Family requesting several days to think before next steps. Meeting for early next week.

## 2025-03-28 NOTE — CHART NOTE - NSCHARTNOTEFT_GEN_A_CORE
Patient seen and examined.  Remains intubated, sedation weaned off.    Remains unresponsive.  MRI brain results reviewed, consistent with global anoxic brain injury.  Remains on dialysis for oliguric renal failure.  Labs reviewed.    Discussed with the patient's brother.  Family at bedside.  Reviewed Pathology results which demonstrated high grade TCC of the left renal pelvis.  Discussed guarded prognosis given current clinical status and MRI results.  Appreciate palliative care input.  Continue MICU care.

## 2025-03-28 NOTE — PROGRESS NOTE ADULT - ASSESSMENT
Patient is 44y Male with PMHx of sickle cell disease admitted for anemia concerning for sickle cell crisis s/p ureteroscopy w/ L kidney biopsy 3/19, post-operative course c/b acute hemorrhagic shock, PEA arrest with ROSC; currently with acute renal failure on HD and global anoxic brain injury and urothelial cancer and ongoing goals of care.     NEURO:  #Global anoxic brain injury   AAOx0. Not on sedation, having mycolonus in upper body/face/hands. Guarded prognosis.   - D/c propofol   - D/c fenatnyl   - S/p versed gtt, weaned off 3/23  - Repeat CT B 3/24 showing diffuse sulcal effacement with increased intracrnail pressure, and few patchy foci of cortical blurring   - 3/25 MRIB with diffuse global abnormal supratentorial cortical restricted diffusion consistent with global hypoxic injury     #Seizures  Myoclonus of upper body.   - Witness myoclonic jerking concerning for seizures iso anoxic brain injury  - s/p 2.5g keppra load  - vEEG report: "Abundant myoclonic seizures in the setting of a severe diffuse/multifocal cerebral dysfunction which can be seen in the setting of sedative effect or due to an anoxic injury, with improvement after 20:50 suggesting a lowering of sedation"; will f/u with neuro recommendations  -C/w levetiracetam 500 mg po BID   - Trend enolase   - repeat vEEG 3/24 showing severe cerebral dysfunction   -C/w midazolam 2mg IV q8 PRN     CV:  #Shock - hemorrhagic shock s/p kidney biopsy s/p 5u pRBC, 3 plasma, 3 plt- REOSOLVED   Hemodynamically stable.   last dose of lovenox AC on 3/18 at 5PM   Hgb 8 -> ~3   - Not on levophed, MAP goal>65  - Monitor cbc q24  -C/w midodrine  20 mg po TID     #Hx of VTE (R IJ thrombus, L brachial DVT)  - CTM, hold AC given hemorrhage  - Bullae present on arm with DVT,  - Appreciate orthopedics hand surgery consult, not compartment syndrome     PULM:  #Acute hypoxic respiratory failure  In the setting of PEA arrest   CXR with R lung field and left mid and lower lung field hazy opacities.  - Abx as below  - Wean vent as able  - HTS nebs, duonebs  - 3/28 CXR showing increasing RUL consolidation     RENAL:  #Acute renal failure   #Renal mass s/p biopsy  #Acidosis  Oligouric.  On hemodialysis. Remains hypervolemic.    - TOV 3/28   - Trend Cr, uop, lytes  - S/p bicarb drip  - Per nephro, HD session #1 3/24, HD sessions #2 3/25, HD session #3 3/27; planning HD #4 3/28   - Per pathology result of renal biopsy, noninvasive urothelial malignancy       GI:  #Shock liver  Downtrending LFTs. Jaundice with hyperbilirubinemia (direct).   - Trend liver enzymes  - RUQ US showing enlarged periportal and periaortic enlarged lymph nodes and enlarged left lateral liver lobe and CBD 9 mm.     #Diet:  - NPO w/ tube feed     HEMATOLOGIC:  #Sickle cell crisis  #Acute blood loss enamia  Elevated indirect hyperbilirubinemia, likely from  peripheral hemolysis.   - heme following   -D/c deferasirox due to current renal failure     #DVT prophylaxis - SCD    Endo:  #JUAN    ID:  Persistent fever and uptrending leukocytosis. Bullae and desquamation without active signs of infection in LUE.   - D/c  aztreonam 2000 mg IV qd ( started 3/22- 3/27)   -C/w  meropenem 500 mg IV qd (started 3/27-3/31)     MSK:  #infiltration  Infiltration of sodium bicarb into left arm, now with arm distension and bullae. Elevated uric acid with nodule suggegstive of podagra however no signs of active gout flare.   -Appreciate orthopedic hand surgery, not compartment syndrome   - CT    Ethics: Full Code   -Had family meeting with neurology, MICU team; ongoing goals of care conversation   -Appreciate palliative recommendations, will continue to have conversations

## 2025-03-28 NOTE — PROGRESS NOTE ADULT - SUBJECTIVE AND OBJECTIVE BOX
[facility]  Eve Tavares Palliative Care Attending  Contact Info: Page [phone] (including Nights/Weekends), message on Microsoft Teams (Eve Tavares), or leave  at Palliative Office [phone] (non-urgent)   Date of [id number] @ 15:29    SUBJECTIVE AND OBJECTIVE: Patient seen this afternoon with family members at bedside. Patient remains intubated, off sedation, continues with myoclonus of his face, hands and body.     Indication for Geriatrics and Palliative Care Services/INTERVAL HPI: complex goc in setting of SCD c/b PEA arrest with anoxic brain injury     OVERNIGHT EVENTS:  > 3/28: Patient with persistent fevers.   > 3/27: Patient febrile overnight s/p cooling blankets and tylenol.       DNR on chart:  Allergies    penicillin (Pruritus)  hydroxyurea (Other)  piperacillin-tazobactam (Urticaria)  ceftriaxone (Anaphylaxis)    Intolerances    MEDICATIONS  (STANDING):  allopurinol 50 milliGRAM(s) Oral <User Schedule>  chlorhexidine 0.12% Liquid 15 milliLiter(s) Oral Mucosa every 12 hours  chlorhexidine 2% Cloths 1 Application(s) Topical daily  folic acid 1 milliGRAM(s) Oral daily  influenza   Vaccine 0.5 milliLiter(s) IntraMuscular once  levETIRAcetam 500 milliGRAM(s) Oral two times a day  meropenem  IVPB 500 milliGRAM(s) IV Intermittent every 24 hours  midodrine 20 milliGRAM(s) Oral every 8 hours  mupirocin 2% Nasal 1 Application(s) Both Nostrils every 12 hours  petrolatum Ophthalmic Ointment 1 Application(s) Both EYES two times a day    MEDICATIONS  (PRN):  midazolam Injectable 2 milliGRAM(s) IV Push every 4 hours PRN tremors      ITEMS UNCHECKED ARE NOT PRESENT    PRESENT SYMPTOMS: [ x]Unable to self-report - see [ ] CPOT [x ] PAINADS [ ] RDOS  Source if other than patient:  [ ]Family   [ ]Team     Pain:  [ ]yes [ ]no  QOL impact -   Location -                    Aggravating factors -  Quality -  Radiation -  Timing-  Severity (0-10 scale):  Minimal acceptable level/ pain goal (0-10 scale):     CPOT:    https://www.sccm.org/getattachment/kcc56m27-8l7u-3f6o-0w1c-9022j2655j4c/Critical-Care-Pain-Observation-Tool-(CPOT)    Dyspnea:                           [ ]Mild [ ]Moderate [ ]Severe  Anxiety:                             [ ]Mild [ ]Moderate [ ]Severe  Fatigue:                             [ ]Mild [ ]Moderate [ ]Severe  Nausea:                             [ ]Mild [ ]Moderate [ ]Severe  Loss of appetite:              [ ]Mild [ ]Moderate [ ]Severe  Constipation:                    [ ]Mild [ ]Moderate [ ]Severe  Other Symptoms:  [ ]All other review of systems negative     PCSSQ[Palliative Care Spiritual Screening Question]   Severity (0-10):  Score of 4 or > indicate consideration of Chaplaincy referral.  Chaplaincy Referral: [ x] yes [ ] refused [ ] following [ ] deferred    Caregiver [place]? : [x ] yes [ ] no [ ] Deferred [ ] Declined             Social work referral [ x] Patient & Family Centered Care Referral [ ]  Anticipatory Grief present?:  [ x] yes [ ] no  [ ] Deferred                  Social work referral [ x] Patient & Family Centered Care Referral [ ]      PHYSICAL EXAM:  Vital Signs Last 24 Hrs  T(C): 38 (28 Mar 2025 08:00), Max: 38.5 (27 Mar 2025 17:30)  T(F): 100.4 (28 Mar 2025 08:00), Max: 101.3 (27 Mar 2025 17:30)  HR: 94 (28 Mar 2025 15:26) (77 - 110)  BP: --  BP(mean): --  RR: 21 (28 Mar 2025 15:26) (12 - 28)  SpO2: 100% (28 Mar 2025 15:26) (93% - 100%)    Parameters below as of 28 Mar 2025 15:26  Patient On (Oxygen Delivery Method): ventilator     I&O's Summary    27 Mar 2025 07:01  -  28 Mar 2025 07:00  --------------------------------------------------------  IN: 1495 mL / OUT: 1935 mL / NET: -440 mL    28 Mar 2025 07:01  -  28 Mar 2025 15:29  --------------------------------------------------------  IN: 350 mL / OUT: 0 mL / NET: 350 mL       GENERAL: [ ]Cachexia   Pt intubated and off sedation   [ ]Alert  [ ]Oriented x   [ ]Lethargic  [x ]Unarousable  [ ]Verbal  [ ]Non-Verbal  Behavioral:   [ ] Anxiety  [ ] Delirium [ ] Agitation [x] Other  HEENT:  [ ]Normal   [ ]Dry mouth   [x ]ET Tube/Trach  [ ]Oral lesions  PULMONARY:   [ ]Clear [ ]Tachypnea  [ ]Audible excessive secretions   [ ]Rhonchi        [ ]Right [ ]Left [ ]Bilateral  [ ]Crackles        [ ]Right [ ]Left [ ]Bilateral  [ ]Wheezing     [ ]Right [ ]Left [ ]Bilateral  [x ]Diminished breath sounds [ ]right [ ]left [ ]bilateral  CARDIOVASCULAR:    [x ]Regular [ ]Irregular [ ]Tachy  [ ]Harley [ ]Murmur [ ]Other  GASTROINTESTINAL:  [ ]Soft  [ ]Distended   [ x]+BS  [ ]Non tender [x ]Tender  [ ]Other [ ]PEG [x ]OGT/ NGT  Last BM: 3/28  GENITOURINARY:  [ ]Normal [ ] Incontinent   [ ]Oliguria/Anuria   [ x]Dave  MUSCULOSKELETAL: myoclonic jerks   [ ]Normal   [ ]Weakness  [x ]Bed/Wheelchair bound [ ]Edema  NEUROLOGIC:   [ ]No focal deficits  [ ]Cognitive impairment  [ ]Dysphagia [ ]Dysarthria [ ]Paresis [x ]Other - anoxic brain injury   SKIN: Please see flowsheets   [ ]Normal  [ ]Rash  [ x]Other  [ ]Pressure ulcer(s)       Present on admission [ ]y [ ]n    CRITICAL CARE:  [ ]Shock Present  [ ]Septic [ ]Cardiogenic [ ]Neurologic [ ]Hypovolemic  [ ]Vasopressors [ ]Inotropes  [x ]Respiratory failure present [ ]Mechanical Ventilation [ ]Non-invasive ventilatory support [ ]High-Flow Mode: CPAP with PS, FiO2: 80, PEEP: 10, PS: 12, MAP: 14, PIP: 23  [ ]Acute  [ ]Chronic [ ]Hypoxic  [ ]Hypercarbic [ ]Other  [ ]Other organ failure     LABS:                        7.6    23.45 )-----------( 285      ( 28 Mar 2025 00:55 )             23.0   03-28    135  |  95[L]  |  59[H]  ----------------------------<  114[H]  3.9   |  22  |  4.39[H]    Ca    9.1      28 Mar 2025 00:55  Phos  5.4     03-28  Mg     2.20     03-28    TPro  6.2  /  Alb  2.6[L]  /  TBili  14.5[H]  /  DBili  x   /  AST  89[H]  /  ALT  48[H]  /  AlkPhos  177[H]  03-28  PT/INR - ( 28 Mar 2025 00:55 )   PT: 12.2 sec;   INR: 1.05 ratio         PTT - ( 28 Mar 2025 00:55 )  PTT:29.3 sec    Urinalysis Basic - ( 28 Mar 2025 00:55 )    Color: x / Appearance: x / SG: x / pH: x  Gluc: 114 mg/dL / Ketone: x  / Bili: x / Urobili: x   Blood: x / Protein: x / Nitrite: x   Leuk Esterase: x / RBC: x / WBC x   Sq Epi: x / Non Sq Epi: x / Bacteria: x      RADIOLOGY & ADDITIONAL STUDIES: no new     Protein Calorie Malnutrition Present: [ ]mild [ ]moderate [ ]severe [ ]underweight [ ]morbid obesity  https://www.andeal.org/vault/2440/web/files/ONC/Table_Clinical%20Characteristics%20to%20Document%20Malnutrition-White%20JV%20et%20al%202012.pdf    Height (cm): 175 (03-19-25 @ 10:38), 170.2 (03-12-25 @ 10:17), 175.3 (12-08-24 @ 21:08)  Weight (kg): 88.8 (03-19-25 @ 10:38), 90.7 (03-12-25 @ 10:17), 87.1 (12-08-24 @ 21:08)  BMI (kg/m2): 29 (03-19-25 @ 10:38), 31.3 (03-12-25 @ 10:17), 28.3 (12-08-24 @ 21:08)    [x ]PPSV2 < or = 30%  [ ]significant weight loss [ ]poor nutritional intake [ ]anasarca[ ]Artificial Nutrition    Other REFERRALS:  [ ]Hospice  [ ]Child Life  [ ]Social Work  [ ]Case management [ ]Holistic Therapy

## 2025-03-28 NOTE — PROGRESS NOTE ADULT - ATTENDING COMMENTS
oliguric ATN   anoxic brain injury  v/ol. s/p HD yesterday 1.5L removed  family aware and discussing GOC  plan for HD in am

## 2025-03-28 NOTE — PROGRESS NOTE ADULT - PROBLEM SELECTOR PLAN 1
Pt w/ oliguric MATA iso hemorrhagic shock and PEA arrest. Likely ATN. NTDC placed and underwent HD on 3/34 iso worsening oliguric renal failure, w/ associated hyperkalemia and hypervolemia. Last HD 3/27. Labs from this morning reviewed. Electrolytes within acceptable range. 24hr UOP ~35cc despite diuretic challenge. UA (3/16) w/ proteinuria and hematuria (21 RBC). Renal US (3/20) w/o hydronephrosis, and w/ large left renal subcapsular hematoma. CXR (3/22) w/ b/l hazy opacities. Pt hemodynamically unstable, volume overloaded. However MRI w/ global hypoxic injury. Pt w/ poor prognosis. Plan for next HD on 3/29. Monitor labs, VS and UOP. Avoid nephrotoxins when possible. Dose medications for HD.

## 2025-03-28 NOTE — PROGRESS NOTE ADULT - SUBJECTIVE AND OBJECTIVE BOX
MICU PROGRESS NOTE     HISTORY OF PRESENT ILLNESS     NIRAJ. Pulled cordis yesterday and placed A line in L femoral. Had hemodialysis with 1.5 L removed yesterday. Persistent fevers since 4 PM yesterday. HR 80s-100. SBPs 90s-170/40s-80s. MV PCPAP with PSV 12 PEEP 10 FiO2 80.         ROS: All negative except as listed above.    VITAL SIGNS:  ICU Vital Signs Last 24 Hrs  T(C): 38.1 (28 Mar 2025 04:00), Max: 38.5 (27 Mar 2025 17:30)  T(F): 100.6 (28 Mar 2025 04:00), Max: 101.3 (27 Mar 2025 17:30)  HR: 97 (28 Mar 2025 07:54) (77 - 110)  BP: --  BP(mean): --  ABP: 126/59 (28 Mar 2025 06:00) (98/47 - 174/81)  ABP(mean): 79 (28 Mar 2025 06:00) (61 - 112)  RR: 19 (28 Mar 2025 06:00) (15 - 28)  SpO2: 100% (28 Mar 2025 07:54) (93% - 100%)    O2 Parameters below as of 28 Mar 2025 06:00  Patient On (Oxygen Delivery Method): ventilator, PSV 12/10     O2 Concentration (%): 80      Mode: CPAP with PS, FiO2: 80, PEEP: 10, PS: 12, MAP: 15, PIP: 27  Plateau pressure:   P/F ratio:     03-27 @ 07:01  -  03-28 @ 07:00  --------------------------------------------------------  IN: 1495 mL / OUT: 1935 mL / NET: -440 mL      CAPILLARY BLOOD GLUCOSE          ECG: reviewed.    PHYSICAL EXAM:    General:   Neuro:   HEENT:  Chest:   Heart:   Lungs:   Abd:   Ext:   Back:   Pulses:   Lines/tubes/drains:   Psych:     MEDICATIONS:  MEDICATIONS  (STANDING):  allopurinol 50 milliGRAM(s) Oral <User Schedule>  chlorhexidine 0.12% Liquid 15 milliLiter(s) Oral Mucosa every 12 hours  chlorhexidine 2% Cloths 1 Application(s) Topical daily  folic acid 1 milliGRAM(s) Oral daily  influenza   Vaccine 0.5 milliLiter(s) IntraMuscular once  levETIRAcetam 500 milliGRAM(s) Oral two times a day  meropenem  IVPB 500 milliGRAM(s) IV Intermittent every 24 hours  midodrine 20 milliGRAM(s) Oral every 8 hours  petrolatum Ophthalmic Ointment 1 Application(s) Both EYES two times a day    MEDICATIONS  (PRN):  midazolam Injectable 2 milliGRAM(s) IV Push every 4 hours PRN tremors      ALLERGIES:  Allergies    penicillin (Pruritus)  hydroxyurea (Other)  piperacillin-tazobactam (Urticaria)  ceftriaxone (Anaphylaxis)    Intolerances        LABS:                        7.6    23.45 )-----------( 285      ( 28 Mar 2025 00:55 )             23.0     03-28    135  |  95[L]  |  59[H]  ----------------------------<  114[H]  3.9   |  22  |  4.39[H]    Ca    9.1      28 Mar 2025 00:55  Phos  5.4     03-28  Mg     2.20     03-28    TPro  6.2  /  Alb  2.6[L]  /  TBili  14.5[H]  /  DBili  x   /  AST  89[H]  /  ALT  48[H]  /  AlkPhos  177[H]  03-28    PT/INR - ( 28 Mar 2025 00:55 )   PT: 12.2 sec;   INR: 1.05 ratio         PTT - ( 28 Mar 2025 00:55 )  PTT:29.3 sec  Urinalysis Basic - ( 28 Mar 2025 00:55 )    Color: x / Appearance: x / SG: x / pH: x  Gluc: 114 mg/dL / Ketone: x  / Bili: x / Urobili: x   Blood: x / Protein: x / Nitrite: x   Leuk Esterase: x / RBC: x / WBC x   Sq Epi: x / Non Sq Epi: x / Bacteria: x      ABG:  pH, Arterial: 7.37 (03-28-25 @ 00:55)  pCO2, Arterial: 43 mmHg (03-28-25 @ 00:55)  pO2, Arterial: 170 mmHg (03-28-25 @ 00:55)      vBG:    Micro:    Culture - Blood (collected 03-20-25 @ 10:04)  Source: Blood Blood-Peripheral  Final Report (03-25-25 @ 13:01):    No growth at 5 days        Culture - Sputum (collected 03-26-25 @ 11:45)  Source: ET Tube ET Tube  Gram Stain (03-26-25 @ 22:28):    Few Squamous epithelial cells per low power field    Moderate polymorphonuclear leukocytes per low power field    Few Yeast per oil power field  Preliminary Report (03-27-25 @ 16:31):    Commensal lay consistent with body site    Culture - Sputum (collected 03-22-25 @ 17:15)  Source: ET Tube ET Tube  Gram Stain (03-22-25 @ 23:42):    Numerous polymorphonuclear leukocytes per low power field    Rare Squamous epithelial cells per low power field    No organisms seen per oil power field  Final Report (03-24-25 @ 06:52):    Commensal lay consistent with body site        RADIOLOGY & ADDITIONAL TESTS: Reviewed. MICU PROGRESS NOTE     HISTORY OF PRESENT ILLNESS     NIRAJ. Pulled cordis yesterday and placed A line in L femoral. Had hemodialysis with 1.5 L removed yesterday. Persistent fevers since 4 PM yesterday. HR 80s-100. SBPs 90s-170/40s-80s. MV PCPAP with PSV 12 PEEP 10 FiO2 80.     Seen and examined at bedside, patient remains unable to engage in meaningfully engagement. Myoclonus in face, upper body, and hands.     ROS: All negative except as listed above.    VITAL SIGNS:  ICU Vital Signs Last 24 Hrs  T(C): 38.1 (28 Mar 2025 04:00), Max: 38.5 (27 Mar 2025 17:30)  T(F): 100.6 (28 Mar 2025 04:00), Max: 101.3 (27 Mar 2025 17:30)  HR: 97 (28 Mar 2025 07:54) (77 - 110)  BP: --  BP(mean): --  ABP: 126/59 (28 Mar 2025 06:00) (98/47 - 174/81)  ABP(mean): 79 (28 Mar 2025 06:00) (61 - 112)  RR: 19 (28 Mar 2025 06:00) (15 - 28)  SpO2: 100% (28 Mar 2025 07:54) (93% - 100%)    O2 Parameters below as of 28 Mar 2025 06:00  Patient On (Oxygen Delivery Method): ventilator, PSV 12/10     O2 Concentration (%): 80      Mode: CPAP with PS, FiO2: 80, PEEP: 10, PS: 12, MAP: 15, PIP: 27  Plateau pressure:   P/F ratio:     03-27 @ 07:01  -  03-28 @ 07:00  --------------------------------------------------------  IN: 1495 mL / OUT: 1935 mL / NET: -440 mL      CAPILLARY BLOOD GLUCOSE          ECG: reviewed.    PHYSICAL EXAM:    General: NAD, normal habitus, mild jaundice   Neuro: AAOx0, myoclonus of upper body/face/hands, non responsive to noxious stimuli, closed eyes, no verbal sounds, 4 mm pupils, sluggishly reactive pupils b/l   HEENT: icteric sclerae b/l , increased watery discharge from eyes   Chest: nonTTP   Heart: S1/S2, RRR   Lungs: CTA b/l, on MV   Abd: soft, nonTTP, nondistended   Ext: generalized nonpitting edema, LUE with bullae and desquamation along forearm, nodule on R 1st digit proximal phalanges   Pulses: radial 2+ b/l, dorsalis pedis 2+ b/l   Lines/tubes/drains: A line, L femoral, OG tube, L IJV, daniel   Psych: unable to assess     MEDICATIONS  (STANDING):  allopurinol 50 milliGRAM(s) Oral <User Schedule>  chlorhexidine 0.12% Liquid 15 milliLiter(s) Oral Mucosa every 12 hours  chlorhexidine 2% Cloths 1 Application(s) Topical daily  folic acid 1 milliGRAM(s) Oral daily  influenza   Vaccine 0.5 milliLiter(s) IntraMuscular once  levETIRAcetam 500 milliGRAM(s) Oral two times a day  meropenem  IVPB 500 milliGRAM(s) IV Intermittent every 24 hours  midodrine 20 milliGRAM(s) Oral every 8 hours  petrolatum Ophthalmic Ointment 1 Application(s) Both EYES two times a day    MEDICATIONS  (PRN):  midazolam Injectable 2 milliGRAM(s) IV Push every 4 hours PRN tremors      ALLERGIES:  Allergies    penicillin (Pruritus)  hydroxyurea (Other)  piperacillin-tazobactam (Urticaria)  ceftriaxone (Anaphylaxis)    Intolerances        LABS:                        7.6    23.45 )-----------( 285      ( 28 Mar 2025 00:55 )             23.0     03-28    135  |  95[L]  |  59[H]  ----------------------------<  114[H]  3.9   |  22  |  4.39[H]    Ca    9.1      28 Mar 2025 00:55  Phos  5.4     03-28  Mg     2.20     03-28    TPro  6.2  /  Alb  2.6[L]  /  TBili  14.5[H]  /  DBili  x   /  AST  89[H]  /  ALT  48[H]  /  AlkPhos  177[H]  03-28    PT/INR - ( 28 Mar 2025 00:55 )   PT: 12.2 sec;   INR: 1.05 ratio         PTT - ( 28 Mar 2025 00:55 )  PTT:29.3 sec  Urinalysis Basic - ( 28 Mar 2025 00:55 )    Color: x / Appearance: x / SG: x / pH: x  Gluc: 114 mg/dL / Ketone: x  / Bili: x / Urobili: x   Blood: x / Protein: x / Nitrite: x   Leuk Esterase: x / RBC: x / WBC x   Sq Epi: x / Non Sq Epi: x / Bacteria: x      ABG:  pH, Arterial: 7.37 (03-28-25 @ 00:55)  pCO2, Arterial: 43 mmHg (03-28-25 @ 00:55)  pO2, Arterial: 170 mmHg (03-28-25 @ 00:55)      vBG:    Micro:    Culture - Blood (collected 03-20-25 @ 10:04)  Source: Blood Blood-Peripheral  Final Report (03-25-25 @ 13:01):    No growth at 5 days        Culture - Sputum (collected 03-26-25 @ 11:45)  Source: ET Tube ET Tube  Gram Stain (03-26-25 @ 22:28):    Few Squamous epithelial cells per low power field    Moderate polymorphonuclear leukocytes per low power field    Few Yeast per oil power field  Preliminary Report (03-27-25 @ 16:31):    Commensal lay consistent with body site    Culture - Sputum (collected 03-22-25 @ 17:15)  Source: ET Tube ET Tube  Gram Stain (03-22-25 @ 23:42):    Numerous polymorphonuclear leukocytes per low power field    Rare Squamous epithelial cells per low power field    No organisms seen per oil power field  Final Report (03-24-25 @ 06:52):    Commensal lay consistent with body site        RADIOLOGY & ADDITIONAL TESTS: Reviewed.

## 2025-03-28 NOTE — PROGRESS NOTE ADULT - TIME BILLING
Time spent for extensive review of the physical chart, electronic medical record, and documentation to obtain collateral information including but not limited to:  [x ] Inpatient records (ED, H&P, primary team, and consultants if applicable, care coordination)  [x ] Inpatient values/results (biomarkers, immunoassays, imaging, and microbiology results)  [x ] Current or proposed treatment plans  [x  ] Discussion with the primary team  [x ] Discussion with the patient, surrogate decision maker, or family    Time spent: >35 min

## 2025-03-28 NOTE — PROGRESS NOTE ADULT - SUBJECTIVE AND OBJECTIVE BOX
[facility] Division of Kidney Diseases & Hypertension  FOLLOW UP NOTE  [phone]--------------------------------------------------------------------------------  Chief Complaint: MATA on HD    24 hour events/subjective: Pt seen and examined this morning in MICU. Pt intubated/sedated. Unable to obtain history/ROS.     PAST HISTORY  --------------------------------------------------------------------------------  No significant changes to PMH, PSH, FHx, SHx, unless otherwise noted    ALLERGIES & MEDICATIONS  --------------------------------------------------------------------------------  Allergies    penicillin (Pruritus)  hydroxyurea (Other)  piperacillin-tazobactam (Urticaria)  ceftriaxone (Anaphylaxis)    Intolerances    Standing Inpatient Medications  allopurinol 50 milliGRAM(s) Oral <User Schedule>  chlorhexidine 0.12% Liquid 15 milliLiter(s) Oral Mucosa every 12 hours  chlorhexidine 2% Cloths 1 Application(s) Topical daily  folic acid 1 milliGRAM(s) Oral daily  influenza   Vaccine 0.5 milliLiter(s) IntraMuscular once  levETIRAcetam 500 milliGRAM(s) Oral two times a day  meropenem  IVPB 500 milliGRAM(s) IV Intermittent every 24 hours  midodrine 20 milliGRAM(s) Oral every 8 hours  petrolatum Ophthalmic Ointment 1 Application(s) Both EYES two times a day    PRN Inpatient Medications  midazolam Injectable 2 milliGRAM(s) IV Push every 4 hours PRN      REVIEW OF SYSTEMS  --------------------------------------------------------------------------------  see above    VITALS/PHYSICAL EXAM  --------------------------------------------------------------------------------  T(C): 38 (03-28-25 @ 08:00), Max: 38.5 (03-27-25 @ 17:30)  HR: 99 (03-28-25 @ 08:00) (77 - 110)  BP: --  RR: 18 (03-28-25 @ 08:00) (15 - 28)  SpO2: 100% (03-28-25 @ 08:00) (93% - 100%)  Wt(kg): --      03-27-25 @ 07:01  -  03-28-25 @ 07:00  --------------------------------------------------------  IN: 1495 mL / OUT: 1935 mL / NET: -440 mL    Physical Exam:  	Gen: Sedated  	HEENT: +ETT  	Pulm: Mechanical breath sounds, FIO2 100%  	CV: S1S2  	Abd: Soft, +BS   	Ext: +LE edema B/L  	Neuro: Sedated  	Skin: Warm and dry  	[facility]ascular [facility]: [facility]    LABS/STUDIES  --------------------------------------------------------------------------------              7.6    23.45 >-----------<  285      [03-28-25 @ 00:55]              23.0     135  |  95  |  59  ----------------------------<  114      [03-28-25 @ 00:55]  3.9   |  22  |  4.39        Ca     9.1     [03-28-25 @ 00:55]      iCa    1.15     [03-27 @ 01:45]      Mg     2.20     [03-28-25 @ 00:55]      Phos  5.4     [03-28-25 @ 00:55]    TPro  6.2  /  Alb  2.6  /  TBili  14.5  /  DBili  x   /  AST  89  /  ALT  48  /  AlkPhos  177  [03-28-25 @ 00:55]    PT/INR: PT 12.2 , INR 1.05       [03-28-25 @ 00:55]  PTT: 29.3       [03-28-25 @ 00:55]    Uric acid 6.9      [03-27-25 @ 01:45]        [03-28-25 @ 00:55]    Creatinine Trend:  SCr 4.39 [03-28 @ 00:55]  SCr 5.04 [03-27 @ 01:45]  SCr 3.85 [03-26 @ 01:25]  SCr 4.62 [03-25 @ 00:48]  SCr 6.41 [03-24 @ 16:51]    Lipid: chol --, , HDL --, LDL --      [03-24-25 @ 18:00]

## 2025-03-28 NOTE — PROGRESS NOTE ADULT - PROBLEM SELECTOR PLAN 1
Pt s/p PEA arrest on 3/20. Pt currently intubated.   > MR brain concerning for hypoxic ischemic injury prognosis is guarded at this time and current scan is suggestive of poor neurological recovery and prolonged vegetative state.   > Appreciate Neurology recs- trend enolase, s/p VEEG showed severe cerebral dysfunction  > Per discussion with MICU team, family would like to review imaging with Neurologist.    > Patient on keppra 500mg tid   > Patient has been off sedation since 3/26   > Weaned off pressors   > Appreciate MICU care.  > Fevers- tylenol, cooling blankets

## 2025-03-28 NOTE — CHART NOTE - NSCHARTNOTEFT_GEN_A_CORE
______________ CRITICAL CARE NUTRITION FOLLOW-UP ________________        --Medical Course--  44y Male with PMHx of sickle cell disease admitted for anemia concerning for sickle cell crisis s/p ureteroscopy w/ L kidney biopsy 3/19, post-operative course c/b acute hemorrhagic shock, PEA arrest with ROSC, admitted to MICU for further management.  Acute hypoxic respiratory failure, intubated/sedated.  Pt. with witnessed myoclonic jerking concerning for seizures in setting of anoxic brain injury    Remains with MATA and noted with volume overload.  Per nephrology, intermittent HD.    Remains intubated/sedated.  No longer on Propofol.      Ongoing goals of care discussion with family.         --Nutrition Course--  Weight status quite variable, but with overall weight increase, which is likely fluid related considering 2-4+ edema.    Spoke with RN.    Has been receiving tube feeding with Nepro.  Observed by RDN at prescribed and previously recommended goal rate of 45ml/hr.  Along with Liquid Protein Supplement (LPS) 3x daily, this regimen met previously estimated energy/protein needs.  Energy requirements re-assessed.  Would increase tube feeding goal rate to 50mL/hr and continue Liquid Protein Supplement (LPS) 3x daily to further increase protein intake.    This will provide:  900mL total volume  1620 Kcals (+ 300 KCals from LPS) = 1920 KCals  73g protein (+ 45g protein from LPS) = 118g protein  657mL free water      Met with family members @ bedside.  Explained role of enteral nutrition and provided with education.  Answered all nutrition related questions.    Advised RDN remains available.          __________Diet Order_________  Diet, NPO with Tube Feed:   Tube Feeding Modality: Orogastric  Nepro with Carb Steady (NEPRORTH)  Total Volume for 24 Hours (mL): 810  Continuous  Until Goal Tube Feed Rate (mL per Hour): 45  Tube Feed Duration (in Hours): 18  Tube Feed Start Time: 11:00  Tube Feed Stop Time: 05:00  Liquid Protein Supplement     Qty per Day:  3 (03-24-25 @ 13:43) [Active]      810mL total volume  1458 Kcals  (+ 300 KCals from LPS) = 1758 KCals   66g protein (+45g protein from LPS) = 111g protein  589mL free water        Weight:      Height:  69" / 175.26cm               Ideal Body Weight: 160lbs / 72.7kg  109.9g (3/28)  87.3kg (3/24)  111.4kg (3/22)  88.8kg (3/15 on admit)          _____Current & Estimated Energy Needs (based on IBW)_____  25-30 KCals/kg = 2024-9185 KCals/d  1.6-1.8g protein/kg= 116-131g protein/d        Edema: 2+ generalized, 4+ scrotum  Last bowel movement: (3/28).  Also with prior fecal impaction.    Skin: No pressure injuries.    ________________________Pertinent Medications____________   MEDICATIONS  (STANDING):  allopurinol 50 milliGRAM(s) Oral <User Schedule>  chlorhexidine 0.12% Liquid 15 milliLiter(s) Oral Mucosa every 12 hours  chlorhexidine 2% Cloths 1 Application(s) Topical daily  folic acid 1 milliGRAM(s) Oral daily  influenza   Vaccine 0.5 milliLiter(s) IntraMuscular once  levETIRAcetam 500 milliGRAM(s) Oral two times a day  meropenem  IVPB 500 milliGRAM(s) IV Intermittent every 24 hours  midodrine 20 milliGRAM(s) Oral every 8 hours  mupirocin 2% Nasal 1 Application(s) Both Nostrils every 12 hours  petrolatum Ophthalmic Ointment 1 Application(s) Both EYES two times a day    MEDICATIONS  (PRN):  midazolam Injectable 2 milliGRAM(s) IV Push every 4 hours PRN tremors          _________________________Pertinent Labs____________________     03-28    135  |  95[L]  |  59[H]  ----------------------------<  114[H]  3.9   |  22  |  4.39[H]    Ca    9.1      28 Mar 2025 00:55  Phos  5.4     03-28  Mg     2.20     03-28              Triglycerides, Serum: 348 mg/dL (03-24-25 @ 18:00)          PLAN/RECOMMENDATIONS:    1) Increase Nepro to goal of 50mL/hr x 18hrs (11a-5a) and continue Liquid Protein Supplement (LPS) 3x daily  2) Obtain daily weights  3) Monitor tolerance to tube feeding, GI status, electrolytes, glucose    RDN remains available and will f/u PRN.          Courtney Mendez, BRENDEN, CDN       pager [phone] or MS Teams

## 2025-03-29 LAB
ALBUMIN SERPL ELPH-MCNC: 2.5 G/DL — LOW (ref 3.3–5)
ALP SERPL-CCNC: 184 U/L — HIGH (ref 40–120)
ALT FLD-CCNC: 40 U/L — SIGNIFICANT CHANGE UP (ref 4–41)
ANION GAP SERPL CALC-SCNC: 19 MMOL/L — HIGH (ref 7–14)
ANISOCYTOSIS BLD QL: SLIGHT — SIGNIFICANT CHANGE UP
APTT BLD: 29.9 SEC — SIGNIFICANT CHANGE UP (ref 24.5–35.6)
AST SERPL-CCNC: 84 U/L — HIGH (ref 4–40)
BASOPHILS # BLD AUTO: 0.23 K/UL — HIGH (ref 0–0.2)
BASOPHILS NFR BLD AUTO: 0.9 % — SIGNIFICANT CHANGE UP (ref 0–2)
BILIRUB SERPL-MCNC: 13.6 MG/DL — HIGH (ref 0.2–1.2)
BUN SERPL-MCNC: 74 MG/DL — HIGH (ref 7–23)
BURR CELLS BLD QL SMEAR: PRESENT — SIGNIFICANT CHANGE UP
CALCIUM SERPL-MCNC: 9.3 MG/DL — SIGNIFICANT CHANGE UP (ref 8.4–10.5)
CHLORIDE SERPL-SCNC: 96 MMOL/L — LOW (ref 98–107)
CO2 SERPL-SCNC: 20 MMOL/L — LOW (ref 22–31)
CREAT SERPL-MCNC: 5.39 MG/DL — HIGH (ref 0.5–1.3)
EGFR: 13 ML/MIN/1.73M2 — LOW
EGFR: 13 ML/MIN/1.73M2 — LOW
EOSINOPHIL # BLD AUTO: 0.43 K/UL — SIGNIFICANT CHANGE UP (ref 0–0.5)
EOSINOPHIL NFR BLD AUTO: 1.7 % — SIGNIFICANT CHANGE UP (ref 0–6)
GAS PNL BLDA: SIGNIFICANT CHANGE UP
GAS PNL BLDA: SIGNIFICANT CHANGE UP
GIANT PLATELETS BLD QL SMEAR: PRESENT — SIGNIFICANT CHANGE UP
GLUCOSE SERPL-MCNC: 119 MG/DL — HIGH (ref 70–99)
HCT VFR BLD CALC: 20.4 % — CRITICAL LOW (ref 39–50)
HGB BLD-MCNC: 7.4 G/DL — LOW (ref 13–17)
IANC: 20.53 K/UL — HIGH (ref 1.8–7.4)
INR BLD: 1.01 RATIO — SIGNIFICANT CHANGE UP (ref 0.85–1.16)
LYMPHOCYTES # BLD AUTO: 1.12 K/UL — SIGNIFICANT CHANGE UP (ref 1–3.3)
LYMPHOCYTES # BLD AUTO: 4.4 % — LOW (ref 13–44)
MAGNESIUM SERPL-MCNC: 2.4 MG/DL — SIGNIFICANT CHANGE UP (ref 1.6–2.6)
MCHC RBC-ENTMCNC: 29.5 PG — SIGNIFICANT CHANGE UP (ref 27–34)
MCHC RBC-ENTMCNC: 36.3 G/DL — HIGH (ref 32–36)
MCV RBC AUTO: 81.3 FL — SIGNIFICANT CHANGE UP (ref 80–100)
MONOCYTES # BLD AUTO: 1.32 K/UL — HIGH (ref 0–0.9)
MONOCYTES NFR BLD AUTO: 5.2 % — SIGNIFICANT CHANGE UP (ref 2–14)
NEUTROPHILS NFR BLD AUTO: 87.8 % — HIGH (ref 43–77)
OVALOCYTES BLD QL SMEAR: SLIGHT — SIGNIFICANT CHANGE UP
PHOSPHATE SERPL-MCNC: 6 MG/DL — HIGH (ref 2.5–4.5)
PLAT MORPH BLD: NORMAL — SIGNIFICANT CHANGE UP
PLATELET # BLD AUTO: 343 K/UL — SIGNIFICANT CHANGE UP (ref 150–400)
PLATELET COUNT - ESTIMATE: NORMAL — SIGNIFICANT CHANGE UP
POIKILOCYTOSIS BLD QL AUTO: SLIGHT — SIGNIFICANT CHANGE UP
POTASSIUM SERPL-MCNC: 3.8 MMOL/L — SIGNIFICANT CHANGE UP (ref 3.5–5.3)
POTASSIUM SERPL-SCNC: 3.8 MMOL/L — SIGNIFICANT CHANGE UP (ref 3.5–5.3)
PROT SERPL-MCNC: 6.3 G/DL — SIGNIFICANT CHANGE UP (ref 6–8.3)
PROTHROM AB SERPL-ACNC: 12 SEC — SIGNIFICANT CHANGE UP (ref 9.9–13.4)
RBC # BLD: 2.51 M/UL — LOW (ref 4.2–5.8)
RBC # FLD: 18.5 % — HIGH (ref 10.3–14.5)
RBC BLD AUTO: ABNORMAL
SCHISTOCYTES BLD QL AUTO: SLIGHT — SIGNIFICANT CHANGE UP
SODIUM SERPL-SCNC: 135 MMOL/L — SIGNIFICANT CHANGE UP (ref 135–145)
TARGETS BLD QL SMEAR: SLIGHT — SIGNIFICANT CHANGE UP
WBC # BLD: 25.44 K/UL — HIGH (ref 3.8–10.5)
WBC # FLD AUTO: 25.44 K/UL — HIGH (ref 3.8–10.5)

## 2025-03-29 PROCEDURE — 99291 CRITICAL CARE FIRST HOUR: CPT

## 2025-03-29 PROCEDURE — 99232 SBSQ HOSP IP/OBS MODERATE 35: CPT | Mod: GC

## 2025-03-29 RX ORDER — PROPOFOL 10 MG/ML
50 INJECTION, EMULSION INTRAVENOUS
Qty: 1000 | Refills: 0 | Status: DISCONTINUED | OUTPATIENT
Start: 2025-03-29 | End: 2025-04-12

## 2025-03-29 RX ORDER — FENTANYL CITRATE-0.9 % NACL/PF 100MCG/2ML
100 SYRINGE (ML) INTRAVENOUS ONCE
Refills: 0 | Status: DISCONTINUED | OUTPATIENT
Start: 2025-03-29 | End: 2025-03-29

## 2025-03-29 RX ORDER — PROPOFOL 10 MG/ML
20 INJECTION, EMULSION INTRAVENOUS
Qty: 1000 | Refills: 0 | Status: DISCONTINUED | OUTPATIENT
Start: 2025-03-29 | End: 2025-03-29

## 2025-03-29 RX ORDER — LORAZEPAM 4 MG/ML
4 VIAL (ML) INJECTION ONCE
Refills: 0 | Status: DISCONTINUED | OUTPATIENT
Start: 2025-03-29 | End: 2025-03-29

## 2025-03-29 RX ADMIN — MEROPENEM 100 MILLIGRAM(S): 1 INJECTION INTRAVENOUS at 11:41

## 2025-03-29 RX ADMIN — PROPOFOL 10.7 MICROGRAM(S)/KG/MIN: 10 INJECTION, EMULSION INTRAVENOUS at 07:25

## 2025-03-29 RX ADMIN — Medication 1 APPLICATION(S): at 17:39

## 2025-03-29 RX ADMIN — Medication 1 APPLICATION(S): at 11:43

## 2025-03-29 RX ADMIN — Medication 4 MILLIGRAM(S): at 01:58

## 2025-03-29 RX ADMIN — MUPIROCIN CALCIUM 1 APPLICATION(S): 20 CREAM TOPICAL at 17:40

## 2025-03-29 RX ADMIN — FOLIC ACID 1 MILLIGRAM(S): 1 TABLET ORAL at 11:41

## 2025-03-29 RX ADMIN — PROPOFOL 26.6 MICROGRAM(S)/KG/MIN: 10 INJECTION, EMULSION INTRAVENOUS at 23:10

## 2025-03-29 RX ADMIN — Medication 100 MICROGRAM(S): at 02:46

## 2025-03-29 RX ADMIN — LEVETIRACETAM 500 MILLIGRAM(S): 10 INJECTION, SOLUTION INTRAVENOUS at 17:39

## 2025-03-29 RX ADMIN — LEVETIRACETAM 500 MILLIGRAM(S): 10 INJECTION, SOLUTION INTRAVENOUS at 05:20

## 2025-03-29 RX ADMIN — Medication 100 MICROGRAM(S): at 02:16

## 2025-03-29 RX ADMIN — Medication 15 MILLILITER(S): at 05:17

## 2025-03-29 RX ADMIN — Medication 1 APPLICATION(S): at 05:20

## 2025-03-29 RX ADMIN — MUPIROCIN CALCIUM 1 APPLICATION(S): 20 CREAM TOPICAL at 05:20

## 2025-03-29 RX ADMIN — Medication 15 MILLILITER(S): at 17:39

## 2025-03-29 RX ADMIN — PROPOFOL 10.7 MICROGRAM(S)/KG/MIN: 10 INJECTION, EMULSION INTRAVENOUS at 02:32

## 2025-03-29 NOTE — PROGRESS NOTE ADULT - PROBLEM SELECTOR PLAN 1
Pt w/ oliguric MATA iso hemorrhagic shock and PEA arrest. Likely ATN. UA (3/16) w/ proteinuria and hematuria (21 RBC). Renal US (3/20) w/o hydronephrosis, and w/ large left renal subcapsular hematoma. CXR (3/22) w/ b/l hazy opacities. MRI w/ global hypoxic injury.   -NTDC placed and underwent HD on 3/24 iso worsening oliguric renal failure, w/ associated hyperkalemia and hypervolemia. Anuric despite prior diuretic challenge. Last HD 3/27. Electrolytes within range, but pt remains volume overloaded and requiring high FiO2. Plan for HD today. Monitor labs, VS and UOP. Avoid nephrotoxins when possible. Dose medications for HD.

## 2025-03-29 NOTE — PROGRESS NOTE ADULT - ASSESSMENT
Patient is 44y Male with PMHx of sickle cell disease admitted for anemia concerning for sickle cell crisis s/p ureteroscopy w/ L kidney biopsy 3/19, post-operative course c/b acute hemorrhagic shock, PEA arrest with ROSC; currently with acute renal failure on HD and global anoxic brain injury and urothelial cancer and ongoing goals of care.     NEURO:  #Global anoxic brain injury   AAOx0. On sedation with no further myoclonus Guarded prognosis.   - On propofol; wean as tolerated   - D/c fenatnyl   - S/p versed gtt, weaned off 3/23  - Repeat CT B 3/24 showing diffuse sulcal effacement with increased intracrnail pressure, and few patchy foci of cortical blurring   - 3/25 MRIB with diffuse global abnormal supratentorial cortical restricted diffusion consistent with global hypoxic injury   - Neurology aware family interested in reviewing MRIB in detail for GOC     #Seizures  No further cyoclonus of upper body after restarting propofol.   - Witness myoclonic jerking concerning for seizures iso anoxic brain injury  - s/p 2.5g keppra load  - vEEG report: "Abundant myoclonic seizures in the setting of a severe diffuse/multifocal cerebral dysfunction which can be seen in the setting of sedative effect or due to an anoxic injury, with improvement after 20:50 suggesting a lowering of sedation"; will f/u with neuro recommendations  -C/w levetiracetam 500 mg po BID   - Trend enolase   - repeat vEEG 3/24 showing severe cerebral dysfunction   -C/w midazolam 2mg IV q8 PRN     CV:  #Shock - hemorrhagic shock s/p kidney biopsy s/p 5u pRBC, 3 plasma, 3 plt- REOSOLVED   Hemodynamically stable.   last dose of lovenox AC on 3/18 at 5PM   Hgb 8 -> ~3   - Not on levophed, MAP goal>65  - Monitor cbc q24  -C/w midodrine  20 mg po TID     #Hx of VTE (R IJ thrombus, L brachial DVT)  - CTM, hold AC given hemorrhage  - Bullae present on arm with DVT,  - Appreciate orthopedics hand surgery consult, not compartment syndrome     PULM:  #Acute hypoxic respiratory failure  In the setting of PEA arrest. Triggering ventilator on CPAP.   CXR with R lung field and left mid and lower lung field hazy opacities.  - Abx as below  - Wean vent as able  - HTS nebs, duonebs  - 3/28 CXR showing increasing RUL consolidation     RENAL:  #Acute renal failure   #Renal mass s/p biopsy  #Acidosis  Anuric.  On hemodialysis. Remains hypervolemic.    - TOV 3/28   - Trend Cr, uop, lytes  - S/p bicarb drip  - Per nephro, HD session #1 3/24, HD sessions #2 3/25, HD session #3 3/27; planning HD #4 3/29   - Per pathology result of renal biopsy, noninvasive urothelial malignancy       GI:  #Shock liver  Downtrending LFTs. Jaundice with hyperbilirubinemia (direct).   - Trend liver enzymes  - RUQ US showing enlarged periportal and periaortic enlarged lymph nodes and enlarged left lateral liver lobe and CBD 9 mm.     #Diet:  - NPO w/ tube feed     HEMATOLOGIC:  #Sickle cell crisis  #Acute blood loss enamia  Elevated indirect hyperbilirubinemia, likely from  peripheral hemolysis.   - heme following   -D/c deferasirox due to current renal failure     #DVT prophylaxis - SCD    Endo:  #JUAN    ID:  Persistent fever and uptrending leukocytosis. Bullae and desquamation without active signs of infection in LUE.   - D/c  aztreonam 2000 mg IV qd ( started 3/22- 3/27)   -C/w  meropenem 500 mg IV qd (started 3/27-3/31)     MSK:  #infiltration  Infiltration of sodium bicarb into left arm, now with arm distension and bullae. Elevated uric acid with nodule suggegstive of podagra however no signs of active gout flare.   -Appreciate orthopedic hand surgery, not compartment syndrome   - CTM    Ethics: Full Code   -Had family meeting with neurology, MICU team; ongoing goals of care conversation   -Appreciate palliative recommendations, will continue to have conversations  -Family interested in reviewing MRIB with neurology to understand     Patient is 44y Male with PMHx of sickle cell disease admitted for anemia concerning for sickle cell crisis s/p ureteroscopy w/ L kidney biopsy 3/19, post-operative course c/b acute hemorrhagic shock, PEA arrest with ROSC; currently with acute renal failure on HD and global anoxic brain injury and urothelial cancer and ongoing goals of care.     NEURO:  #Global anoxic brain injury   AAOx0. On sedation with no further myoclonus Guarded prognosis.   - On propofol; wean as tolerated   - D/c fenatnyl   - S/p versed gtt, weaned off 3/23  - Repeat CT B 3/24 showing diffuse sulcal effacement with increased intracrnail pressure, and few patchy foci of cortical blurring   - 3/25 MRIB with diffuse global abnormal supratentorial cortical restricted diffusion consistent with global hypoxic injury   - Neurology made aware family interested in reviewing MRIB in detail for GOC     #Seizures  No further cyoclonus of upper body after restarting propofol.   - Witness myoclonic jerking concerning for seizures iso anoxic brain injury  - s/p 2.5g keppra load  - vEEG report: "Abundant myoclonic seizures in the setting of a severe diffuse/multifocal cerebral dysfunction which can be seen in the setting of sedative effect or due to an anoxic injury, with improvement after 20:50 suggesting a lowering of sedation"; will f/u with neuro recommendations  -C/w levetiracetam 500 mg po BID   - Trend enolase   - repeat vEEG 3/24 showing severe cerebral dysfunction   -C/w midazolam 2mg IV q8 PRN     CV:  #Shock - hemorrhagic shock s/p kidney biopsy s/p 5u pRBC, 3 plasma, 3 plt- REOSOLVED   Hemodynamically stable.   last dose of lovenox AC on 3/18 at 5PM   Hgb 8 -> ~3   - Not on levophed, MAP goal>65  - Monitor cbc q24  -C/w midodrine  20 mg po TID     #Hx of VTE (R IJ thrombus, L brachial DVT)  - CTM, hold AC given hemorrhage  - Bullae present on arm with DVT,  - Appreciate orthopedics hand surgery consult, not compartment syndrome     PULM:  #Acute hypoxic respiratory failure  In the setting of PEA arrest. Triggering ventilator on CPAP.   CXR with R lung field and left mid and lower lung field hazy opacities.  - Abx as below  - Wean vent as able  - HTS nebs, duonebs  - 3/28 CXR showing increasing RUL consolidation     RENAL:  #Acute renal failure   #Renal mass s/p biopsy  #Acidosis  Anuric.  On hemodialysis. Remains hypervolemic.    - TOV 3/28   - Trend Cr, uop, lytes  - S/p bicarb drip  - Per nephro, HD session #1 3/24, HD sessions #2 3/25, HD session #3 3/27; planning HD #4 3/29   - Per pathology result of renal biopsy, noninvasive urothelial malignancy       GI:  #Shock liver  Downtrending LFTs. Jaundice with hyperbilirubinemia (direct).   - Trend liver enzymes  - RUQ US showing enlarged periportal and periaortic enlarged lymph nodes and enlarged left lateral liver lobe and CBD 9 mm.     #Diet:  - NPO w/ tube feed     HEMATOLOGIC/ONCOLGOGIC   #Sickle cell crisis  #Acute blood loss enamia  Elevated indirect hyperbilirubinemia, likely from  peripheral hemolysis.   - heme following   -D/c deferasirox due to current renal failure     #Urothelial cancer in kidney   -Biopsy showing nonivasive urothelial malignancy on renal biopsy     #DVT prophylaxis - SCD    Endo:  #JUAN    ID:  Persistent fever and uptrending leukocytosis. Bullae and desquamation without active signs of infection in LUE.   - D/c  aztreonam 2000 mg IV qd ( started 3/22- 3/27)   -C/w  meropenem 500 mg IV qd (started 3/27-3/31)     MSK:  #infiltration  Infiltration of sodium bicarb into left arm, now with arm distension and bullae. Elevated uric acid with nodule suggegstive of podagra however no signs of active gout flare.   -Appreciate orthopedic hand surgery, not compartment syndrome   - CTM    Ethics: Full Code   -Had family meeting with neurology, MICU team; ongoing goals of care conversation   -Appreciate palliative recommendations, will continue to have conversations  -Family interested in reviewing MRIB with neurology to understand

## 2025-03-29 NOTE — PROGRESS NOTE ADULT - SUBJECTIVE AND OBJECTIVE BOX
Objective    Vital signs  T(F): , Max: 100.6 (03-28-25 @ 16:00)  HR: 99 (03-29-25 @ 09:00)  BP: --  SpO2: 100% (03-29-25 @ 09:00)  Wt(kg): --    Output       Labs      03-29 @ 00:15    WBC 25.44 / Hct 20.4  / SCr 5.39     03-28 @ 00:55    WBC 23.45 / Hct 23.0  / SCr 4.39         Culture - Sputum (collected 03-26-25 @ 11:45)  Source: ET Tube ET Tube  Gram Stain (03-26-25 @ 22:28):    Few Squamous epithelial cells per low power field    Moderate polymorphonuclear leukocytes per low power field    Few Yeast per oil power field  Final Report (03-28-25 @ 08:15):    Commensal lay consistent with body site    Culture - Sputum (collected 03-22-25 @ 17:15)  Source: ET Tube ET Tube  Gram Stain (03-22-25 @ 23:42):    Numerous polymorphonuclear leukocytes per low power field    Rare Squamous epithelial cells per low power field    No organisms seen per oil power field  Final Report (03-24-25 @ 06:52):    Commensal lay consistent with body site        Urine Cx: ?  Blood Cx: ?    Imaging

## 2025-03-29 NOTE — PROGRESS NOTE ADULT - ATTENDING COMMENTS
Impression:  S/p cardiac arrest, ROSC 4 minutes in the setting of hypoglycemia and acuteblood loss anemia  Anoxic brain injury. Confirmed by MRI.   Post hypoxic myoclonus. Frequent myoclonus complicating ventilator triggering. started on proprofol with improvement. Still some myoclonus noted.  Sickle cell anemia  MATA requiring HD. ATN  S/P Renal biopsy. Non-invasive papillary urothelial carcinoma  Pnuemonia.  Shock. Likely septic. On midodrine  Acute respiartory failure  DVT. Not on AC given anemia.    Pan:  Mechanical ventilation. On PSV to assist in synchrony. PS 20  Propofol  Continue midodrine  Meropenem  Follow cultures  Tube feeds  DVT prophylaxis intermittent compression device. Not on Chemoprophylaxis given anemia.  Discussed with cousin at bedside who provided the requested code. Explained about anoxic brain injury. Also explained about myoclonus and how they correlate with bad prognosis for neurological recovery. She understood the prognosis. Discussed liberation from artificial life support but she states the most likely course will be continuing life support. Recommended trach and PEG, in case this was aligned with goals of care. Family to have further discussions.

## 2025-03-29 NOTE — PROGRESS NOTE ADULT - ATTENDING COMMENTS
1. MATA - likely ATN. Dialysis today with fluid removal as tolerated.  2. Hyperkalemia - improved.  3. Anemia - transfusion per MICU team.  4. Hyperphosphatemia - repeat 3/30.  If feeds provided, should consider NEPRO.    See above discussion.

## 2025-03-29 NOTE — PROGRESS NOTE ADULT - SUBJECTIVE AND OBJECTIVE BOX
[facility] DIVISION OF KIDNEY DISEASES AND HYPERTENSION --    Reason for consult: MATA    24 hour events/subjective: Patient seen and examined at bedside. Remains intubated. Anuric. Plan for HD today.      PAST HISTORY  --------------------------------------------------------------------------------  No significant changes to PMH, PSH, FHx, SHx, unless otherwise noted    ALLERGIES & MEDICATIONS  --------------------------------------------------------------------------------  Allergies    penicillin (Pruritus)  hydroxyurea (Other)  piperacillin-tazobactam (Urticaria)  ceftriaxone (Anaphylaxis)      Standing Inpatient Medications  allopurinol 50 milliGRAM(s) Oral <User Schedule>  chlorhexidine 0.12% Liquid 15 milliLiter(s) Oral Mucosa every 12 hours  chlorhexidine 2% Cloths 1 Application(s) Topical daily  folic acid 1 milliGRAM(s) Oral daily  influenza   Vaccine 0.5 milliLiter(s) IntraMuscular once  levETIRAcetam  Solution 500 milliGRAM(s) Oral two times a day  meropenem  IVPB 500 milliGRAM(s) IV Intermittent every 24 hours  midodrine 20 milliGRAM(s) Oral every 8 hours  mupirocin 2% Nasal 1 Application(s) Both Nostrils every 12 hours  petrolatum Ophthalmic Ointment 1 Application(s) Both EYES two times a day  propofol Infusion 20 MICROgram(s)/kG/Min IV Continuous <Continuous>    PRN Inpatient Medications  midazolam Injectable 2 milliGRAM(s) IV Push every 4 hours PRN      REVIEW OF SYSTEMS  --------------------------------------------------------------------------------  Unable to obtain ROS due to patient's clinical condition.     VITALS/PHYSICAL EXAM  --------------------------------------------------------------------------------  T(C): 37.2 (03-29-25 @ 10:31), Max: 38.1 (03-28-25 @ 16:00)  HR: 87 (03-29-25 @ 11:21) (82 - 110)  BP: --  RR: 22 (03-29-25 @ 11:00) (12 - 39)  SpO2: 100% (03-29-25 @ 11:21) (96% - 100%)  Wt(kg): --        03-28-25 @ 07:01  -  03-29-25 @ 07:00  --------------------------------------------------------  IN: 930.4 mL / OUT: 0 mL / NET: 930.4 mL    03-29-25 @ 07:01  -  03-29-25 @ 11:43  --------------------------------------------------------  IN: 516 mL / OUT: 2500 mL / NET: -1984 mL      PHYSICAL EXAM:  Gen: critically ill, intubated/sedated  Neuro: sedated  HEENT: +ETT  Pulm: CTA B/L  CV: +S1S2  Abd: soft, non-distended  Extremities: +B/L LE edema  Skin: Warm  Dialysis access: LIJ non-tunneled cath    LABS/STUDIES  --------------------------------------------------------------------------------              7.4    25.44 >-----------<  343      [03-29-25 @ 00:15]              20.4     135  |  96  |  74  ----------------------------<  119      [03-29-25 @ 00:15]  3.8   |  20  |  5.39        Ca     9.3     [03-29-25 @ 00:15]      Mg     2.40     [03-29-25 @ 00:15]      Phos  6.0     [03-29-25 @ 00:15]    TPro  6.3  /  Alb  2.5  /  TBili  13.6  /  DBili  x   /  AST  84  /  ALT  40  /  AlkPhos  184  [03-29-25 @ 00:15]    PT/INR: PT 12.0 , INR 1.01       [03-29-25 @ 00:15]  PTT: 29.9       [03-29-25 @ 00:15]          [03-28-25 @ 00:55]    Creatinine Trend:  SCr 5.39 [03-29 @ 00:15]  SCr 4.39 [03-28 @ 00:55]  SCr 5.04 [03-27 @ 01:45]  SCr 3.85 [03-26 @ 01:25]  SCr 4.62 [03-25 @ 00:48]    Iron 428, TIBC 541, %sat 79      [10-24-24 @ 23:48]  Ferritin 3189      [10-28-24 @ 06:55]  Lipid: chol --, , HDL --, LDL --      [03-24-25 @ 18:00]    HBsAg Nonreact      [03-17-25 @ 15:26]  HCV 0.10, Nonreact      [03-17-25 @ 15:26]  HIV Nonreact      [03-17-25 @ 15:26]

## 2025-03-29 NOTE — PROGRESS NOTE ADULT - SUBJECTIVE AND OBJECTIVE BOX
MICU PROGRESS NOTE     HISTORY OF PRESENT ILLNESS     Overnight, restarted propofol to persistent myoclonus. Persistent fever, HR 80s-100s. SBPs 120s-160s. MV CPAP PSV 12 PEEp 10 FiO2 100. Family still requiring time.     Seen and examined at bedside, HD being set up for the AM. Unable to engage meaningfully in encounter.     ROS: All negative except as listed above.    VITAL SIGNS:  ICU Vital Signs Last 24 Hrs  T(C): 37.9 (29 Mar 2025 07:15), Max: 38.1 (28 Mar 2025 16:00)  T(F): 100.2 (29 Mar 2025 07:15), Max: 100.6 (28 Mar 2025 16:00)  HR: 108 (29 Mar 2025 07:15) (82 - 110)  BP: --  BP(mean): --  ABP: 122/59 (29 Mar 2025 07:15) (110/59 - 188/94)  ABP(mean): 76 (29 Mar 2025 07:00) (74 - 126)  RR: 21 (29 Mar 2025 07:15) (12 - 39)  SpO2: 99% (29 Mar 2025 07:10) (96% - 100%)    O2 Parameters below as of 29 Mar 2025 07:15  Patient On (Oxygen Delivery Method): ventilator          Mode: CPAP with PS, FiO2: 80, PEEP: 10, PS: 12, MAP: 14, PIP: 23  Plateau pressure:   P/F ratio:     03-28 @ 07:01  -  03-29 @ 07:00  --------------------------------------------------------  IN: 930.4 mL / OUT: 0 mL / NET: 930.4 mL      CAPILLARY BLOOD GLUCOSE          ECG: reviewed.    PHYSICAL EXAM:      General: NAD, normal habitus, mild jaundice   Neuro: AAOx0,sedated non responsive to noxious stimuli, closed eyes, no verbal sounds, 4 mm pupils, sluggishly reactive pupils b/l   HEENT: icteric sclerae b/l , increased watery discharge from eyes   Chest: nonTTP   Heart: S1/S2, RRR   Lungs: CTA b/l, on MV   Abd: soft, nonTTP, nondistended   Ext: generalized trace pitting edema, LUE with bullae and desquamation along forearm, nodule on R 1st digit proximal phalanges   Pulses: radial 2+ b/l, dorsalis pedis 2+ b/l   Lines/tubes/drains: A line L femoral, OG tube, L IJV, flexicel   Psych: unable to assess     MEDICATIONS:  MEDICATIONS  (STANDING):  allopurinol 50 milliGRAM(s) Oral <User Schedule>  chlorhexidine 0.12% Liquid 15 milliLiter(s) Oral Mucosa every 12 hours  chlorhexidine 2% Cloths 1 Application(s) Topical daily  folic acid 1 milliGRAM(s) Oral daily  influenza   Vaccine 0.5 milliLiter(s) IntraMuscular once  levETIRAcetam  Solution 500 milliGRAM(s) Oral two times a day  meropenem  IVPB 500 milliGRAM(s) IV Intermittent every 24 hours  midodrine 20 milliGRAM(s) Oral every 8 hours  mupirocin 2% Nasal 1 Application(s) Both Nostrils every 12 hours  petrolatum Ophthalmic Ointment 1 Application(s) Both EYES two times a day  propofol Infusion 20 MICROgram(s)/kG/Min (10.7 mL/Hr) IV Continuous <Continuous>    MEDICATIONS  (PRN):  midazolam Injectable 2 milliGRAM(s) IV Push every 4 hours PRN tremors      ALLERGIES:  Allergies    penicillin (Pruritus)  hydroxyurea (Other)  piperacillin-tazobactam (Urticaria)  ceftriaxone (Anaphylaxis)    Intolerances        LABS:                        7.4    25.44 )-----------( 343      ( 29 Mar 2025 00:15 )             20.4     03-29    135  |  96[L]  |  74[H]  ----------------------------<  119[H]  3.8   |  20[L]  |  5.39[H]    Ca    9.3      29 Mar 2025 00:15  Phos  6.0     03-29  Mg     2.40     03-29    TPro  6.3  /  Alb  2.5[L]  /  TBili  13.6[H]  /  DBili  x   /  AST  84[H]  /  ALT  40  /  AlkPhos  184[H]  03-29    PT/INR - ( 29 Mar 2025 00:15 )   PT: 12.0 sec;   INR: 1.01 ratio         PTT - ( 29 Mar 2025 00:15 )  PTT:29.9 sec  Urinalysis Basic - ( 29 Mar 2025 00:15 )    Color: x / Appearance: x / SG: x / pH: x  Gluc: 119 mg/dL / Ketone: x  / Bili: x / Urobili: x   Blood: x / Protein: x / Nitrite: x   Leuk Esterase: x / RBC: x / WBC x   Sq Epi: x / Non Sq Epi: x / Bacteria: x      ABG:  pH, Arterial: 7.38 (03-29-25 @ 00:35)  pCO2, Arterial: 41 mmHg (03-29-25 @ 00:35)  pO2, Arterial: 96 mmHg (03-29-25 @ 00:35)      vBG:    Micro:    Culture - Blood (collected 03-20-25 @ 10:04)  Source: Blood Blood-Peripheral  Final Report (03-25-25 @ 13:01):    No growth at 5 days        Culture - Sputum (collected 03-26-25 @ 11:45)  Source: ET Tube ET Tube  Gram Stain (03-26-25 @ 22:28):    Few Squamous epithelial cells per low power field    Moderate polymorphonuclear leukocytes per low power field    Few Yeast per oil power field  Final Report (03-28-25 @ 08:15):    Commensal lay consistent with body site    Culture - Sputum (collected 03-22-25 @ 17:15)  Source: ET Tube ET Tube  Gram Stain (03-22-25 @ 23:42):    Numerous polymorphonuclear leukocytes per low power field    Rare Squamous epithelial cells per low power field    No organisms seen per oil power field  Final Report (03-24-25 @ 06:52):    Commensal lay consistent with body site        RADIOLOGY & ADDITIONAL TESTS: Reviewed.

## 2025-03-29 NOTE — PROGRESS NOTE ADULT - ASSESSMENT
44y M s/p  L ureteroscopy, RGP, biopsy, stent placement L kidney wash, L kidney lower pole mass biopsy on 3/19, RRT and code blue 3/20 now in MICU intubated and sedated on pressors with myoclonic activity and signs of anoxic brain injury on CTH 3/20/25.    3/19: s/p L ureteroscopy, RGP, biopsy, stent placement L kidney wash, L kidney lower pole mass biopsy  3/20: RRT 9a and Code Blue 930a, ROSC achieved in 4 minutes, transferred to MICU intubated and sedated on pressors s/p several pRBC, PLT, and plasma transfusions for Hgb 3.4  3/21: H/H 8.4/22.6, Cr 2.38, 2.85  3/22: off pressors, H/H 8.4/22.9, Cr 3.35, 3.68, lactate 1.5  3/23: H/H 8.2/22.9, back on levo 0.07, Cr 4.9, improved UOP clear yellow    Recommendations:  - Appreciate neuro recommendations, MRI consistent with global anoxic brain injury, prognosis for recovery of neurologic function is guarded, ongoing discussion with family regarding GOC. Appreciate palliative care consultation and recommendations.   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 Rosey Carolina Lindsay for Urology  [address] [place] [phone]

## 2025-03-30 LAB
ALBUMIN SERPL ELPH-MCNC: 2.6 G/DL — LOW (ref 3.3–5)
ALP SERPL-CCNC: 197 U/L — HIGH (ref 40–120)
ALT FLD-CCNC: 34 U/L — SIGNIFICANT CHANGE UP (ref 4–41)
ANION GAP SERPL CALC-SCNC: 20 MMOL/L — HIGH (ref 7–14)
APTT BLD: 28.3 SEC — SIGNIFICANT CHANGE UP (ref 24.5–35.6)
AST SERPL-CCNC: 86 U/L — HIGH (ref 4–40)
BASOPHILS # BLD AUTO: 0.15 K/UL — SIGNIFICANT CHANGE UP (ref 0–0.2)
BASOPHILS NFR BLD AUTO: 0.6 % — SIGNIFICANT CHANGE UP (ref 0–2)
BILIRUB DIRECT SERPL-MCNC: 10.2 MG/DL — SIGNIFICANT CHANGE UP (ref 0–0.3)
BILIRUB INDIRECT FLD-MCNC: 2.4 MG/DL — SIGNIFICANT CHANGE UP (ref 0–1)
BILIRUB SERPL-MCNC: 12.6 MG/DL — HIGH (ref 0.2–1.2)
BILIRUB SERPL-MCNC: 12.6 MG/DL — HIGH (ref 0.2–1.2)
BLOOD GAS ARTERIAL - LYTES,HGB,ICA,LACT RESULT: SIGNIFICANT CHANGE UP
BUN SERPL-MCNC: 54 MG/DL — HIGH (ref 7–23)
CA-I BLD-SCNC: 1.17 MMOL/L — SIGNIFICANT CHANGE UP (ref 1.15–1.29)
CALCIUM SERPL-MCNC: 9.4 MG/DL — SIGNIFICANT CHANGE UP (ref 8.4–10.5)
CHLORIDE SERPL-SCNC: 95 MMOL/L — LOW (ref 98–107)
CO2 SERPL-SCNC: 20 MMOL/L — LOW (ref 22–31)
CREAT SERPL-MCNC: 4.1 MG/DL — HIGH (ref 0.5–1.3)
EGFR: 17 ML/MIN/1.73M2 — LOW
EGFR: 17 ML/MIN/1.73M2 — LOW
EOSINOPHIL # BLD AUTO: 0.96 K/UL — HIGH (ref 0–0.5)
EOSINOPHIL NFR BLD AUTO: 3.7 % — SIGNIFICANT CHANGE UP (ref 0–6)
GLUCOSE SERPL-MCNC: 108 MG/DL — HIGH (ref 70–99)
HAPTOGLOB SERPL-MCNC: < 20 MG/DL — SIGNIFICANT CHANGE UP (ref 34–200)
HBV CORE AB SER-ACNC: SIGNIFICANT CHANGE UP
HBV SURFACE AB SER-ACNC: 50.9 MIU/ML — SIGNIFICANT CHANGE UP
HCT VFR BLD CALC: 20.9 % — CRITICAL LOW (ref 39–50)
HGB BLD-MCNC: 7.6 G/DL — LOW (ref 13–17)
IANC: 20.81 K/UL — HIGH (ref 1.8–7.4)
IMM GRANULOCYTES NFR BLD AUTO: 1.8 % — HIGH (ref 0–0.9)
INR BLD: 1.03 RATIO — SIGNIFICANT CHANGE UP (ref 0.85–1.16)
LDH SERPL L TO P-CCNC: 786 U/L — HIGH (ref 135–225)
LYMPHOCYTES # BLD AUTO: 1.83 K/UL — SIGNIFICANT CHANGE UP (ref 1–3.3)
LYMPHOCYTES # BLD AUTO: 7 % — LOW (ref 13–44)
MAGNESIUM SERPL-MCNC: 2.2 MG/DL — SIGNIFICANT CHANGE UP (ref 1.6–2.6)
MCHC RBC-ENTMCNC: 30.2 PG — SIGNIFICANT CHANGE UP (ref 27–34)
MCHC RBC-ENTMCNC: 36.4 G/DL — HIGH (ref 32–36)
MCV RBC AUTO: 82.9 FL — SIGNIFICANT CHANGE UP (ref 80–100)
MONOCYTES # BLD AUTO: 1.78 K/UL — HIGH (ref 0–0.9)
MONOCYTES NFR BLD AUTO: 6.8 % — SIGNIFICANT CHANGE UP (ref 2–14)
NEUTROPHILS # BLD AUTO: 20.81 K/UL — HIGH (ref 1.8–7.4)
NEUTROPHILS NFR BLD AUTO: 80.1 % — HIGH (ref 43–77)
NRBC # BLD AUTO: 0.13 K/UL — HIGH (ref 0–0)
NRBC # FLD: 0.13 K/UL — HIGH (ref 0–0)
NRBC BLD AUTO-RTO: 0 /100 WBCS — SIGNIFICANT CHANGE UP (ref 0–0)
PHOSPHATE SERPL-MCNC: 5 MG/DL — HIGH (ref 2.5–4.5)
PLATELET # BLD AUTO: 383 K/UL — SIGNIFICANT CHANGE UP (ref 150–400)
POTASSIUM SERPL-MCNC: 3.7 MMOL/L — SIGNIFICANT CHANGE UP (ref 3.5–5.3)
POTASSIUM SERPL-SCNC: 3.7 MMOL/L — SIGNIFICANT CHANGE UP (ref 3.5–5.3)
PROT SERPL-MCNC: 6.6 G/DL — SIGNIFICANT CHANGE UP (ref 6–8.3)
PROTHROM AB SERPL-ACNC: 11.9 SEC — SIGNIFICANT CHANGE UP (ref 9.9–13.4)
RBC # BLD: 2.52 M/UL — LOW (ref 4.2–5.8)
RBC # BLD: 2.52 M/UL — LOW (ref 4.2–5.8)
RBC # FLD: 19.4 % — HIGH (ref 10.3–14.5)
RETICS #: 181.2 K/UL — HIGH (ref 25–125)
RETICS/RBC NFR: 7.2 % — HIGH (ref 0.5–2.5)
SODIUM SERPL-SCNC: 135 MMOL/L — SIGNIFICANT CHANGE UP (ref 135–145)
URATE SERPL-MCNC: 5.1 MG/DL — SIGNIFICANT CHANGE UP (ref 3.4–8.8)
WBC # BLD: 26 K/UL — HIGH (ref 3.8–10.5)
WBC # FLD AUTO: 26 K/UL — HIGH (ref 3.8–10.5)

## 2025-03-30 PROCEDURE — 99291 CRITICAL CARE FIRST HOUR: CPT | Mod: GC

## 2025-03-30 PROCEDURE — 99233 SBSQ HOSP IP/OBS HIGH 50: CPT | Mod: GC

## 2025-03-30 RX ADMIN — Medication 40 MILLIEQUIVALENT(S): at 05:30

## 2025-03-30 RX ADMIN — PROPOFOL 26.6 MICROGRAM(S)/KG/MIN: 10 INJECTION, EMULSION INTRAVENOUS at 19:34

## 2025-03-30 RX ADMIN — Medication 1 APPLICATION(S): at 11:14

## 2025-03-30 RX ADMIN — LEVETIRACETAM 500 MILLIGRAM(S): 10 INJECTION, SOLUTION INTRAVENOUS at 05:30

## 2025-03-30 RX ADMIN — MEROPENEM 100 MILLIGRAM(S): 1 INJECTION INTRAVENOUS at 11:13

## 2025-03-30 RX ADMIN — MUPIROCIN CALCIUM 1 APPLICATION(S): 20 CREAM TOPICAL at 05:30

## 2025-03-30 RX ADMIN — Medication 1 APPLICATION(S): at 05:31

## 2025-03-30 RX ADMIN — Medication 15 MILLILITER(S): at 05:30

## 2025-03-30 RX ADMIN — MUPIROCIN CALCIUM 1 APPLICATION(S): 20 CREAM TOPICAL at 18:02

## 2025-03-30 RX ADMIN — PROPOFOL 26.6 MICROGRAM(S)/KG/MIN: 10 INJECTION, EMULSION INTRAVENOUS at 05:31

## 2025-03-30 RX ADMIN — PROPOFOL 26.6 MICROGRAM(S)/KG/MIN: 10 INJECTION, EMULSION INTRAVENOUS at 11:14

## 2025-03-30 RX ADMIN — FOLIC ACID 1 MILLIGRAM(S): 1 TABLET ORAL at 11:14

## 2025-03-30 RX ADMIN — Medication 15 MILLILITER(S): at 18:02

## 2025-03-30 RX ADMIN — LEVETIRACETAM 500 MILLIGRAM(S): 10 INJECTION, SOLUTION INTRAVENOUS at 18:01

## 2025-03-30 RX ADMIN — Medication 1 APPLICATION(S): at 18:02

## 2025-03-30 NOTE — PROGRESS NOTE ADULT - SUBJECTIVE AND OBJECTIVE BOX
[facility] DIVISION OF KIDNEY DISEASES AND HYPERTENSION --    Reason for consult: MATA    24 hour events/subjective: Patient seen and examined at bedside. Remains intubated. Anuric. Tolerated HD well yesterday.      PAST HISTORY  --------------------------------------------------------------------------------  No significant changes to PMH, PSH, FHx, SHx, unless otherwise noted    ALLERGIES & MEDICATIONS  --------------------------------------------------------------------------------  Allergies    penicillin (Pruritus)  hydroxyurea (Other)  piperacillin-tazobactam (Urticaria)  ceftriaxone (Anaphylaxis)      Standing Inpatient Medications  allopurinol 50 milliGRAM(s) Oral <User Schedule>  chlorhexidine 0.12% Liquid 15 milliLiter(s) Oral Mucosa every 12 hours  chlorhexidine 2% Cloths 1 Application(s) Topical daily  folic acid 1 milliGRAM(s) Oral daily  influenza   Vaccine 0.5 milliLiter(s) IntraMuscular once  levETIRAcetam  Solution 500 milliGRAM(s) Oral two times a day  meropenem  IVPB 500 milliGRAM(s) IV Intermittent every 24 hours  midodrine 20 milliGRAM(s) Oral every 8 hours  mupirocin 2% Nasal 1 Application(s) Both Nostrils every 12 hours  petrolatum Ophthalmic Ointment 1 Application(s) Both EYES two times a day  propofol Infusion 50 MICROgram(s)/kG/Min IV Continuous <Continuous>    PRN Inpatient Medications  midazolam Injectable 2 milliGRAM(s) IV Push every 4 hours PRN      REVIEW OF SYSTEMS  --------------------------------------------------------------------------------  Unable to obtain ROS due to patient's clinical condition.     VITALS/PHYSICAL EXAM  --------------------------------------------------------------------------------  T(C): 36.8 (03-30-25 @ 08:00), Max: 37.8 (03-30-25 @ 04:00)  HR: 89 (03-30-25 @ 09:00) (80 - 97)  BP: --  RR: 20 (03-30-25 @ 09:00) (12 - 24)  SpO2: 98% (03-30-25 @ 09:00) (94% - 100%)  Wt(kg): --        03-29-25 @ 07:01  -  03-30-25 @ 07:00  --------------------------------------------------------  IN: 1585 mL / OUT: 3100 mL / NET: -1515 mL    03-30-25 @ 07:01  -  03-30-25 @ 09:46  --------------------------------------------------------  IN: 53.2 mL / OUT: 0 mL / NET: 53.2 mL      PHYSICAL EXAM:  Gen: critically ill, intubated/sedated  Neuro: sedated  HEENT: +ETT  Pulm: CTA B/L  CV: +S1S2  Abd: soft, non-distended  Extremities: +B/L LE edema  Skin: Warm  Dialysis access: LIJ non-tunneled cath    LABS/STUDIES  --------------------------------------------------------------------------------              7.6    26.00 >-----------<  383      [03-30-25 @ 00:40]              20.9     135  |  95  |  54  ----------------------------<  108      [03-30-25 @ 00:40]  3.7   |  20  |  4.10        Ca     9.4     [03-30-25 @ 00:40]      iCa    1.17     [03-30 @ 00:40]      Mg     2.20     [03-30-25 @ 00:40]      Phos  5.0     [03-30-25 @ 00:40]    TPro  6.6  /  Alb  2.6  /  TBili  12.6  /  DBili  10.2  /  AST  86  /  ALT  34  /  AlkPhos  197  [03-30-25 @ 00:40]    PT/INR: PT 11.9 , INR 1.03       [03-30-25 @ 00:40]  PTT: 28.3       [03-30-25 @ 00:40]    Uric acid 5.1      [03-30-25 @ 00:40]        [03-30-25 @ 00:40]    Creatinine Trend:  SCr 4.10 [03-30 @ 00:40]  SCr 5.39 [03-29 @ 00:15]  SCr 4.39 [03-28 @ 00:55]  SCr 5.04 [03-27 @ 01:45]  SCr 3.85 [03-26 @ 01:25]      Iron 428, TIBC 541, %sat 79      [10-24-24 @ 23:48]  Ferritin 3189      [10-28-24 @ 06:55]  Lipid: chol --, , HDL --, LDL --      [03-24-25 @ 18:00]    HBsAb 50.9      [03-29-25 @ 07:15]  HBsAg Nonreact      [03-17-25 @ 15:26]  HBcAb Nonreact      [03-29-25 @ 07:15]  HCV 0.10, Nonreact      [03-17-25 @ 15:26]  HIV Nonreact      [03-17-25 @ 15:26]

## 2025-03-30 NOTE — PROGRESS NOTE ADULT - PROBLEM SELECTOR PLAN 1
Pt w/ oliguric MATA iso hemorrhagic shock and PEA arrest. Likely ATN. UA (3/16) w/ proteinuria and hematuria (21 RBC). Renal US (3/20) w/o hydronephrosis, and w/ large left renal subcapsular hematoma. CXR (3/22) w/ b/l hazy opacities. MRI w/ global hypoxic injury.   -NTDC placed and underwent HD on 3/24 iso worsening oliguric renal failure, w/ associated hyperkalemia and hypervolemia. Anuric despite prior diuretic challenge. Last HD 3/29, tolerated well. Electrolytes within range. Monitor daily for HD/UF needs. Monitor labs, VS and UOP. Avoid nephrotoxins when possible. Dose medications for HD.

## 2025-03-30 NOTE — PROGRESS NOTE ADULT - ATTENDING COMMENTS
Impression:  S/p cardiac arrest, ROSC 4 minutes in the setting of hypoglycemia and acute blood loss anemia  Anoxic brain injury. Confirmed by MRI.   Post hypoxic myoclonus. Frequent myoclonus complicating ventilator triggering. started on proprofol with improvement. Still some myoclonus noted.  Sickle cell anemia  MATA requiring HD. ATN. Resolving.   Urinary retention. straight cath with 650 cc  S/P Renal biopsy. Non-invasive papillary urothelial carcinoma  Pneumonia.  Shock. Likely septic. On midodrine  Acute respiratory failure  DVT. Not on AC given anemia.    Pan:  Mechanical ventilation. Between AC and PSV to assist in synchrony.  Propofol  Continue midodrine  Meropenem  Follow cultures  Tube feeds  Strict intake and output  Straight cath q 6 hours  Follow renal  DVT prophylaxis intermittent compression device. Not on Chemoprophylaxis given anemia.  Discussed with cousin 3/29 at bedside who provided the requested code. Explained about anoxic brain injury. Also explained about myoclonus and how they correlate with bad prognosis for neurological recovery. She understood the prognosis. Discussed liberation from artificial life support but she states the most likely course will be continuing life support. Trach and PEG, in case this was aligned with goals of care.

## 2025-03-30 NOTE — PROGRESS NOTE ADULT - ASSESSMENT
Patient is 44y Male with PMHx of sickle cell disease admitted for anemia concerning for sickle cell crisis s/p ureteroscopy w/ L kidney biopsy 3/19, post-operative course c/b acute hemorrhagic shock, PEA arrest with ROSC; currently with acute renal failure on HD and global anoxic brain injury and urothelial cancer and ongoing goals of care.     NEURO:  #Global anoxic brain injury   AAOx0. On sedation with no further myoclonus Guarded prognosis.   - On propofol; wean as tolerated   - D/c fenatnyl   - S/p versed gtt, weaned off 3/23  - Repeat CT B 3/24 showing diffuse sulcal effacement with increased intracrnail pressure, and few patchy foci of cortical blurring   - 3/25 MRIB with diffuse global abnormal supratentorial cortical restricted diffusion consistent with global hypoxic injury   - Neurology made aware family interested in reviewing MRIB in detail for GOC     #Seizures  No further cyoclonus of upper body after restarting propofol.   - Witness myoclonic jerking concerning for seizures iso anoxic brain injury  - s/p 2.5g keppra load  - vEEG report: "Abundant myoclonic seizures in the setting of a severe diffuse/multifocal cerebral dysfunction which can be seen in the setting of sedative effect or due to an anoxic injury, with improvement after 20:50 suggesting a lowering of sedation"; will f/u with neuro recommendations  -C/w levetiracetam 500 mg po BID   - Trend enolase   - repeat vEEG 3/24 showing severe cerebral dysfunction   -C/w midazolam 2mg IV q8 PRN     CV:  #Shock - hemorrhagic shock s/p kidney biopsy s/p 5u pRBC, 3 plasma, 3 plt- REOSOLVED   Hemodynamically stable.   last dose of lovenox AC on 3/18 at 5PM   Hgb 8 -> ~3   - Not on levophed, MAP goal>65  - Monitor cbc q24  -C/w midodrine  20 mg po TID     #Hx of VTE (R IJ thrombus, L brachial DVT)  - CTM, hold AC given hemorrhage  - Bullae present on arm with DVT,  - Appreciate orthopedics hand surgery consult, not compartment syndrome     PULM:  #Acute hypoxic respiratory failure  In the setting of PEA arrest. Triggering ventilator on CPAP.   CXR with R lung field and left mid and lower lung field hazy opacities.  - Abx as below  - Wean vent as able  - HTS nebs, duonebs  - 3/28 CXR showing increasing RUL consolidation     RENAL:  #Acute renal failure   #Renal mass s/p biopsy  #Acidosis  Anuric.  On hemodialysis. Remains hypervolemic.    - TOV 3/28   - Trend Cr, uop, lytes  - S/p bicarb drip  - Per nephro, HD session #1 3/24, HD sessions #2 3/25, HD session #3 3/27; planning HD #4 3/29   - Per pathology result of renal biopsy, noninvasive urothelial malignancy       GI:  #Shock liver  Downtrending LFTs. Jaundice with hyperbilirubinemia (direct).   - Trend liver enzymes  - RUQ US showing enlarged periportal and periaortic enlarged lymph nodes and enlarged left lateral liver lobe and CBD 9 mm.     #Diet:  - NPO w/ tube feed     HEMATOLOGIC/ONCOLGOGIC   #Sickle cell crisis  #Acute blood loss enamia  Elevated indirect hyperbilirubinemia, likely from  peripheral hemolysis.   - heme following   -D/c deferasirox due to current renal failure     #Urothelial cancer in kidney   -Biopsy showing nonivasive urothelial malignancy on renal biopsy     #DVT prophylaxis - SCD    Endo:  #JUAN    ID:  Persistent fever and uptrending leukocytosis. Bullae and desquamation without active signs of infection in LUE.   - D/c  aztreonam 2000 mg IV qd ( started 3/22- 3/27)   -C/w  meropenem 500 mg IV qd (started 3/27-3/31)     MSK:  #infiltration  Infiltration of sodium bicarb into left arm, now with arm distension and bullae. Elevated uric acid with nodule suggegstive of podagra however no signs of active gout flare.   -Appreciate orthopedic hand surgery, not compartment syndrome   - CTM    Ethics: Full Code   -Had family meeting with neurology, MICU team; ongoing goals of care conversation   -Appreciate palliative recommendations, will continue to have conversations  -Family interested in reviewing MRIB with neurology to understand     Patient is 44y Male with PMHx of sickle cell disease admitted for anemia concerning for sickle cell crisis s/p ureteroscopy w/ L kidney biopsy 3/19, post-operative course c/b acute hemorrhagic shock, PEA arrest with ROSC; currently with acute renal failure on HD and global anoxic brain injury and urothelial cancer and ongoing goals of care.       NEURO:  #Global anoxic brain injury   AAOx0. On sedation with no further myoclonus Guarded prognosis.   - On propofol; wean as tolerated   - D/c fenatnyl   - S/p versed gtt, weaned off 3/23  - Repeat CT B 3/24 showing diffuse sulcal effacement with increased intracrnail pressure, and few patchy foci of cortical blurring   - 3/25 MRIB with diffuse global abnormal supratentorial cortical restricted diffusion consistent with global hypoxic injury   - Neurology made aware family interested in reviewing MRIB in detail for GOC       #Seizures  No further cyoclonus of upper body after restarting propofol.   - Witness myoclonic jerking concerning for seizures iso anoxic brain injury  - s/p 2.5g keppra load  - vEEG report: "Abundant myoclonic seizures in the setting of a severe diffuse/multifocal cerebral dysfunction which can be seen in the setting of sedative effect or due to an anoxic injury, with improvement after 20:50 suggesting a lowering of sedation"; will f/u with neuro recommendations  -C/w levetiracetam 500 mg po BID   - Trend enolase   - repeat vEEG 3/24 showing severe cerebral dysfunction   -C/w midazolam 2mg IV q8 PRN       CV:  #Shock - hemorrhagic shock s/p kidney biopsy s/p 5u pRBC, 3 plasma, 3 plt- REOSOLVED   Hemodynamically stable.   last dose of lovenox AC on 3/18 at 5PM   Hgb 8 -> ~3   - Not on levophed, MAP goal>65  - Monitor cbc q24  - Midodrine d/c'ed 03/30      #Hx of VTE (R IJ thrombus, L brachial DVT)  - CTM, hold AC given hemorrhage  - Bullae present on arm with DVT,  - Appreciate orthopedics hand surgery consult, not compartment syndrome       PULM:  #Acute hypoxic respiratory failure  In the setting of PEA arrest. Triggering ventilator on CPAP.   CXR with R lung field and left mid and lower lung field hazy opacities.  - Abx as below  - Wean vent as able  - HTS nebs, duonebs  - 3/28 CXR showing increasing RUL consolidation       RENAL:  #Acute renal failure   #Renal mass s/p biopsy  #Acidosis  Anuric.  On hemodialysis. Remains hypervolemic.    - TOV 3/28   - Trend Cr, uop, lytes  - S/p bicarb drip  - Per nephro, HD session #1 3/24, HD sessions #2 3/25, HD session #3 3/27; planning HD #4 3/29   - Per pathology result of renal biopsy, noninvasive urothelial malignancy       # urinary Incontinence  - Episode overnight. Patient was previously anuric  Plan:  - Bladder scan and straight cath PRN  - Continue HD as above      GI:  #Shock liver  Downtrending LFTs. Jaundice with hyperbilirubinemia (direct).   - Trend liver enzymes  - RUQ US showing enlarged periportal and periaortic enlarged lymph nodes and enlarged left lateral liver lobe and CBD 9 mm.       #Diet:  - NPO w/ tube feed. Changing to trickle feeds now due to increased residuals     HEMATOLOGIC/ONCOLGOGIC   #Sickle cell crisis  #Acute blood loss enamia  Elevated indirect hyperbilirubinemia, likely from  peripheral hemolysis.   - heme following   -D/c deferasirox due to current renal failure     #Urothelial cancer in kidney   -Biopsy showing nonivasive urothelial malignancy on renal biopsy     #DVT prophylaxis - SCD    Endo:  #JUAN    ID:  Persistent fever and uptrending leukocytosis. Bullae and desquamation without active signs of infection in LUE.   - D/c  aztreonam 2000 mg IV qd ( started 3/22- 3/27)   -C/w  meropenem 500 mg IV qd (started 3/27-3/31)     MSK:  #infiltration  Infiltration of sodium bicarb into left arm, now with arm distension and bullae. Elevated uric acid with nodule suggegstive of podagra however no signs of active gout flare.   -Appreciate orthopedic hand surgery, not compartment syndrome   - CTM    Ethics: Full Code   -Had family meeting with neurology, MICU team; ongoing goals of care conversation   -Appreciate palliative recommendations, will continue to have conversations  -Family interested in reviewing MRIB with neurology to understand

## 2025-03-30 NOTE — PROGRESS NOTE ADULT - ATTENDING COMMENTS
Pt. with dialysis-dependent MATA in the setting of hemorrhagic shock, and cardiac arrest. Pt. initiated HD on 3/24/25, last iHD treatment was yesterday (3/29/25). Pt. remains intubated/sedated in MICU. Labs reviewed, electrolytes stable. No plan for HD treatment today but will continue to monitor closely. Monitor labs and urine output. Avoid nephrotoxins. Dose medications as per eGFR.    If you have any questions, please feel free to contact me  Muna Hickey  Nephrology  [phone] / [employer] Teams(Preferred)  (After 4 pm or on weekends please page the on-call fellow)

## 2025-03-30 NOTE — PROGRESS NOTE ADULT - SUBJECTIVE AND OBJECTIVE BOX
***********************************************************************  West Duran M.D  Resident Physician  Department of Medicine  Available on [employer] Teams  ***********************************************************************  Patient is a 44y old  Male who presents with a chief complaint of Referred by Hematologist for low Hg (29 Mar 2025 11:42)      OVERNIGHT EVENTS:     SUBJECTIVE: Patient seen and examined at bedside.     ADDITIONAL REVIEW OF SYSTEMS:    MEDICATIONS  (STANDING):  allopurinol 50 milliGRAM(s) Oral <User Schedule>  chlorhexidine 0.12% Liquid 15 milliLiter(s) Oral Mucosa every 12 hours  chlorhexidine 2% Cloths 1 Application(s) Topical daily  folic acid 1 milliGRAM(s) Oral daily  influenza   Vaccine 0.5 milliLiter(s) IntraMuscular once  levETIRAcetam  Solution 500 milliGRAM(s) Oral two times a day  meropenem  IVPB 500 milliGRAM(s) IV Intermittent every 24 hours  midodrine 20 milliGRAM(s) Oral every 8 hours  mupirocin 2% Nasal 1 Application(s) Both Nostrils every 12 hours  petrolatum Ophthalmic Ointment 1 Application(s) Both EYES two times a day  propofol Infusion 50 MICROgram(s)/kG/Min (26.6 mL/Hr) IV Continuous <Continuous>    MEDICATIONS  (PRN):  midazolam Injectable 2 milliGRAM(s) IV Push every 4 hours PRN tremors      CAPILLARY BLOOD GLUCOSE        I&O's Summary    29 Mar 2025 07:01  -  30 Mar 2025 07:00  --------------------------------------------------------  IN: 1558.4 mL / OUT: 3100 mL / NET: -1541.6 mL        PHYSICAL EXAM:    Vital Signs Last 24 Hrs  T(C): 37.8 (30 Mar 2025 04:00), Max: 37.8 (30 Mar 2025 04:00)  T(F): 100 (30 Mar 2025 04:00), Max: 100 (30 Mar 2025 04:00)  HR: 90 (30 Mar 2025 06:00) (80 - 106)  BP: --  BP(mean): --  RR: 20 (30 Mar 2025 06:00) (12 - 24)  SpO2: 98% (30 Mar 2025 06:00) (94% - 100%)    Parameters below as of 30 Mar 2025 06:00  Patient On (Oxygen Delivery Method): ventilator    O2 Concentration (%): 40    CONSTITUTIONAL: NAD, well-developed, well-groomed  EYES: Conjunctiva and sclera clear  ENMT: Moist oral mucosa, no pharyngeal injection or exudates; normal dentition  NECK: Supple, no palpable masses; no thyromegaly  RESPIRATORY: Normal respiratory effort; lungs are clear to auscultation bilaterally  CARDIOVASCULAR: Regular rate and rhythm, normal S1 and S2, no murmur/rub/gallop  ABDOMEN: Soft, nontender to palpation, normoactive bowel sounds, no rebound/guarding  MUSCULOSKELETAL: No clubbing or cyanosis of digits; no joint swelling or tenderness to palpation  EXTREMITIES:  No lower extremity edema; Peripheral pulses are 2+ bilaterally  PSYCH: A+O to person, place, and time; affect appropriate  NEUROLOGY: no gross sensory deficits   SKIN: No rashes; no palpable lesions    LABS:                        7.6    26.00 )-----------( 383      ( 30 Mar 2025 00:40 )             20.9     03-30    135  |  95[L]  |  54[H]  ----------------------------<  108[H]  3.7   |  20[L]  |  4.10[H]    Ca    9.4      30 Mar 2025 00:40  Phos  5.0     03-30  Mg     2.20     03-30    TPro  6.6  /  Alb  2.6[L]  /  TBili  12.6[H]  /  DBili  10.2  /  AST  86[H]  /  ALT  34  /  AlkPhos  197[H]  03-30    PT/INR - ( 30 Mar 2025 00:40 )   PT: 11.9 sec;   INR: 1.03 ratio         PTT - ( 30 Mar 2025 00:40 )  PTT:28.3 sec      Urinalysis Basic - ( 30 Mar 2025 00:40 )    Color: x / Appearance: x / SG: x / pH: x  Gluc: 108 mg/dL / Ketone: x  / Bili: x / Urobili: x   Blood: x / Protein: x / Nitrite: x   Leuk Esterase: x / RBC: x / WBC x   Sq Epi: x / Non Sq Epi: x / Bacteria: x          RADIOLOGY & ADDITIONAL TESTS:   Results Reviewed: Yes     ***********************************************************************  West Duran M.D  Resident Physician  Department of Medicine  Available on [employer] Teams  ***********************************************************************  Patient is a 44y old  Male who presents with a chief complaint of Referred by Hematologist for low Hg (29 Mar 2025 11:42)      OVERNIGHT EVENTS: Mild desaturation, suctioned and looked like tube feed. Held tube feeds briefly due to high residuals and restarted as trickle feeds.     SUBJECTIVE: Patient seen and examined at bedside.     ADDITIONAL REVIEW OF SYSTEMS:    MEDICATIONS  (STANDING):  allopurinol 50 milliGRAM(s) Oral <User Schedule>  chlorhexidine 0.12% Liquid 15 milliLiter(s) Oral Mucosa every 12 hours  chlorhexidine 2% Cloths 1 Application(s) Topical daily  folic acid 1 milliGRAM(s) Oral daily  influenza   Vaccine 0.5 milliLiter(s) IntraMuscular once  levETIRAcetam  Solution 500 milliGRAM(s) Oral two times a day  meropenem  IVPB 500 milliGRAM(s) IV Intermittent every 24 hours  midodrine 20 milliGRAM(s) Oral every 8 hours  mupirocin 2% Nasal 1 Application(s) Both Nostrils every 12 hours  petrolatum Ophthalmic Ointment 1 Application(s) Both EYES two times a day  propofol Infusion 50 MICROgram(s)/kG/Min (26.6 mL/Hr) IV Continuous <Continuous>    MEDICATIONS  (PRN):  midazolam Injectable 2 milliGRAM(s) IV Push every 4 hours PRN tremors      CAPILLARY BLOOD GLUCOSE        I&O's Summary    29 Mar 2025 07:01  -  30 Mar 2025 07:00  --------------------------------------------------------  IN: 1558.4 mL / OUT: 3100 mL / NET: -1541.6 mL        PHYSICAL EXAM:    Vital Signs Last 24 Hrs  T(C): 37.8 (30 Mar 2025 04:00), Max: 37.8 (30 Mar 2025 04:00)  T(F): 100 (30 Mar 2025 04:00), Max: 100 (30 Mar 2025 04:00)  HR: 90 (30 Mar 2025 06:00) (80 - 106)  BP: --  BP(mean): --  RR: 20 (30 Mar 2025 06:00) (12 - 24)  SpO2: 98% (30 Mar 2025 06:00) (94% - 100%)    Parameters below as of 30 Mar 2025 06:00  Patient On (Oxygen Delivery Method): ventilator    O2 Concentration (%): 40    CONSTITUTIONAL: NAD  RESPIRATORY: Normal respiratory effort on vent  CARDIOVASCULAR: Regular rate and rhythm, normal S1 and S2, no murmur/rub/gallop  ABDOMEN: Soft, normoactive bowel sounds  EXTREMITIES:  No lower extremity edema; Peripheral pulses are 2+ bilaterally  PSYCH: Not alert or oriented. No reaction to painful stimuli  SKIN: No rashes; no palpable lesions    LABS:                        7.6    26.00 )-----------( 383      ( 30 Mar 2025 00:40 )             20.9     03-30    135  |  95[L]  |  54[H]  ----------------------------<  108[H]  3.7   |  20[L]  |  4.10[H]    Ca    9.4      30 Mar 2025 00:40  Phos  5.0     03-30  Mg     2.20     03-30    TPro  6.6  /  Alb  2.6[L]  /  TBili  12.6[H]  /  DBili  10.2  /  AST  86[H]  /  ALT  34  /  AlkPhos  197[H]  03-30    PT/INR - ( 30 Mar 2025 00:40 )   PT: 11.9 sec;   INR: 1.03 ratio         PTT - ( 30 Mar 2025 00:40 )  PTT:28.3 sec      Urinalysis Basic - ( 30 Mar 2025 00:40 )    Color: x / Appearance: x / SG: x / pH: x  Gluc: 108 mg/dL / Ketone: x  / Bili: x / Urobili: x   Blood: x / Protein: x / Nitrite: x   Leuk Esterase: x / RBC: x / WBC x   Sq Epi: x / Non Sq Epi: x / Bacteria: x          RADIOLOGY & ADDITIONAL TESTS:   Results Reviewed: Yes

## 2025-03-31 LAB
ALBUMIN SERPL ELPH-MCNC: 2.6 G/DL — LOW (ref 3.3–5)
ALBUMIN SERPL ELPH-MCNC: 2.8 G/DL — LOW (ref 3.3–5)
ALP SERPL-CCNC: 192 U/L — HIGH (ref 40–120)
ALP SERPL-CCNC: 202 U/L — HIGH (ref 40–120)
ALT FLD-CCNC: 31 U/L — SIGNIFICANT CHANGE UP (ref 4–41)
ALT FLD-CCNC: 32 U/L — SIGNIFICANT CHANGE UP (ref 4–41)
ANION GAP SERPL CALC-SCNC: 20 MMOL/L — HIGH (ref 7–14)
ANION GAP SERPL CALC-SCNC: 20 MMOL/L — HIGH (ref 7–14)
APTT BLD: 27.7 SEC — SIGNIFICANT CHANGE UP (ref 24.5–35.6)
AST SERPL-CCNC: 90 U/L — HIGH (ref 4–40)
AST SERPL-CCNC: 94 U/L — HIGH (ref 4–40)
BASOPHILS # BLD AUTO: 0.15 K/UL — SIGNIFICANT CHANGE UP (ref 0–0.2)
BASOPHILS NFR BLD AUTO: 0.6 % — SIGNIFICANT CHANGE UP (ref 0–2)
BILIRUB SERPL-MCNC: 11.5 MG/DL — HIGH (ref 0.2–1.2)
BILIRUB SERPL-MCNC: 12 MG/DL — HIGH (ref 0.2–1.2)
BLOOD GAS ARTERIAL - LYTES,HGB,ICA,LACT RESULT: SIGNIFICANT CHANGE UP
BUN SERPL-MCNC: 71 MG/DL — HIGH (ref 7–23)
BUN SERPL-MCNC: 77 MG/DL — HIGH (ref 7–23)
CA-I BLD-SCNC: 1.22 MMOL/L — SIGNIFICANT CHANGE UP (ref 1.15–1.29)
CALCIUM SERPL-MCNC: 9.1 MG/DL — SIGNIFICANT CHANGE UP (ref 8.4–10.5)
CALCIUM SERPL-MCNC: 9.4 MG/DL — SIGNIFICANT CHANGE UP (ref 8.4–10.5)
CHLORIDE SERPL-SCNC: 94 MMOL/L — LOW (ref 98–107)
CHLORIDE SERPL-SCNC: 98 MMOL/L — SIGNIFICANT CHANGE UP (ref 98–107)
CK SERPL-CCNC: 15 U/L — LOW (ref 30–200)
CO2 SERPL-SCNC: 18 MMOL/L — LOW (ref 22–31)
CO2 SERPL-SCNC: 20 MMOL/L — LOW (ref 22–31)
CREAT SERPL-MCNC: 4.8 MG/DL — HIGH (ref 0.5–1.3)
CREAT SERPL-MCNC: 4.96 MG/DL — HIGH (ref 0.5–1.3)
EGFR: 14 ML/MIN/1.73M2 — LOW
EOSINOPHIL # BLD AUTO: 0.48 K/UL — SIGNIFICANT CHANGE UP (ref 0–0.5)
EOSINOPHIL NFR BLD AUTO: 2.1 % — SIGNIFICANT CHANGE UP (ref 0–6)
GLUCOSE SERPL-MCNC: 108 MG/DL — HIGH (ref 70–99)
GLUCOSE SERPL-MCNC: 108 MG/DL — HIGH (ref 70–99)
HCT VFR BLD CALC: 22 % — LOW (ref 39–50)
HGB BLD-MCNC: 7.2 G/DL — LOW (ref 13–17)
IANC: 18.77 K/UL — HIGH (ref 1.8–7.4)
IMM GRANULOCYTES NFR BLD AUTO: 2.1 % — HIGH (ref 0–0.9)
INR BLD: 1.03 RATIO — SIGNIFICANT CHANGE UP (ref 0.85–1.16)
LYMPHOCYTES # BLD AUTO: 1.59 K/UL — SIGNIFICANT CHANGE UP (ref 1–3.3)
LYMPHOCYTES # BLD AUTO: 6.9 % — LOW (ref 13–44)
MAGNESIUM SERPL-MCNC: 2.3 MG/DL — SIGNIFICANT CHANGE UP (ref 1.6–2.6)
MCHC RBC-ENTMCNC: 29.8 PG — SIGNIFICANT CHANGE UP (ref 27–34)
MCHC RBC-ENTMCNC: 34 G/DL — SIGNIFICANT CHANGE UP (ref 32–36)
MCV RBC AUTO: 78.9 FL — LOW (ref 80–100)
MONOCYTES # BLD AUTO: 1.72 K/UL — HIGH (ref 0–0.9)
MONOCYTES NFR BLD AUTO: 7.4 % — SIGNIFICANT CHANGE UP (ref 2–14)
NEUTROPHILS # BLD AUTO: 18.77 K/UL — HIGH (ref 1.8–7.4)
NEUTROPHILS NFR BLD AUTO: 80.9 % — HIGH (ref 43–77)
NRBC # BLD AUTO: 0.14 K/UL — HIGH (ref 0–0)
NRBC # FLD: 0.14 K/UL — HIGH (ref 0–0)
NRBC BLD AUTO-RTO: 0 /100 WBCS — SIGNIFICANT CHANGE UP (ref 0–0)
PHOSPHATE SERPL-MCNC: 7 MG/DL — HIGH (ref 2.5–4.5)
PLATELET # BLD AUTO: 470 K/UL — HIGH (ref 150–400)
POTASSIUM SERPL-MCNC: 4.2 MMOL/L — SIGNIFICANT CHANGE UP (ref 3.5–5.3)
POTASSIUM SERPL-MCNC: 4.2 MMOL/L — SIGNIFICANT CHANGE UP (ref 3.5–5.3)
POTASSIUM SERPL-SCNC: 4.2 MMOL/L — SIGNIFICANT CHANGE UP (ref 3.5–5.3)
POTASSIUM SERPL-SCNC: 4.2 MMOL/L — SIGNIFICANT CHANGE UP (ref 3.5–5.3)
PROT SERPL-MCNC: 6.8 G/DL — SIGNIFICANT CHANGE UP (ref 6–8.3)
PROT SERPL-MCNC: 6.8 G/DL — SIGNIFICANT CHANGE UP (ref 6–8.3)
PROTHROM AB SERPL-ACNC: 12 SEC — SIGNIFICANT CHANGE UP (ref 9.9–13.4)
RBC # BLD: 2.42 M/UL — LOW (ref 4.2–5.8)
RBC # FLD: 18.9 % — HIGH (ref 10.3–14.5)
SODIUM SERPL-SCNC: 134 MMOL/L — LOW (ref 135–145)
SODIUM SERPL-SCNC: 136 MMOL/L — SIGNIFICANT CHANGE UP (ref 135–145)
URATE SERPL-MCNC: 7.3 MG/DL — SIGNIFICANT CHANGE UP (ref 3.4–8.8)
WBC # BLD: 23.19 K/UL — HIGH (ref 3.8–10.5)
WBC # FLD AUTO: 23.19 K/UL — HIGH (ref 3.8–10.5)

## 2025-03-31 PROCEDURE — 99291 CRITICAL CARE FIRST HOUR: CPT | Mod: GC

## 2025-03-31 PROCEDURE — 99233 SBSQ HOSP IP/OBS HIGH 50: CPT | Mod: GC

## 2025-03-31 RX ORDER — IPRATROPIUM BROMIDE AND ALBUTEROL SULFATE .5; 2.5 MG/3ML; MG/3ML
3 SOLUTION RESPIRATORY (INHALATION) EVERY 6 HOURS
Refills: 0 | Status: DISCONTINUED | OUTPATIENT
Start: 2025-03-31 | End: 2025-05-16

## 2025-03-31 RX ADMIN — Medication 15 MILLILITER(S): at 06:26

## 2025-03-31 RX ADMIN — LEVETIRACETAM 500 MILLIGRAM(S): 10 INJECTION, SOLUTION INTRAVENOUS at 18:42

## 2025-03-31 RX ADMIN — PROPOFOL 26.6 MICROGRAM(S)/KG/MIN: 10 INJECTION, EMULSION INTRAVENOUS at 11:57

## 2025-03-31 RX ADMIN — PROPOFOL 26.6 MICROGRAM(S)/KG/MIN: 10 INJECTION, EMULSION INTRAVENOUS at 19:19

## 2025-03-31 RX ADMIN — Medication 1 APPLICATION(S): at 11:58

## 2025-03-31 RX ADMIN — IPRATROPIUM BROMIDE AND ALBUTEROL SULFATE 3 MILLILITER(S): .5; 2.5 SOLUTION RESPIRATORY (INHALATION) at 15:34

## 2025-03-31 RX ADMIN — IPRATROPIUM BROMIDE AND ALBUTEROL SULFATE 3 MILLILITER(S): .5; 2.5 SOLUTION RESPIRATORY (INHALATION) at 21:36

## 2025-03-31 RX ADMIN — FOLIC ACID 1 MILLIGRAM(S): 1 TABLET ORAL at 11:58

## 2025-03-31 RX ADMIN — Medication 1 APPLICATION(S): at 18:42

## 2025-03-31 RX ADMIN — MUPIROCIN CALCIUM 1 APPLICATION(S): 20 CREAM TOPICAL at 18:45

## 2025-03-31 RX ADMIN — MUPIROCIN CALCIUM 1 APPLICATION(S): 20 CREAM TOPICAL at 06:27

## 2025-03-31 RX ADMIN — Medication 1 APPLICATION(S): at 06:26

## 2025-03-31 RX ADMIN — MEROPENEM 100 MILLIGRAM(S): 1 INJECTION INTRAVENOUS at 11:57

## 2025-03-31 RX ADMIN — IPRATROPIUM BROMIDE AND ALBUTEROL SULFATE 3 MILLILITER(S): .5; 2.5 SOLUTION RESPIRATORY (INHALATION) at 11:36

## 2025-03-31 RX ADMIN — Medication 15 MILLILITER(S): at 18:42

## 2025-03-31 RX ADMIN — LEVETIRACETAM 500 MILLIGRAM(S): 10 INJECTION, SOLUTION INTRAVENOUS at 06:27

## 2025-03-31 NOTE — GOALS OF CARE CONVERSATION - ADVANCED CARE PLANNING - CONVERSATION DETAILS
I met the family members of patient at their request to review Alex Downey's MRI.     First, I recapitulated broadly, Alex's Hospital course, including his unwitnessed PEA arrest with possible downtime of 35 minutes. I summarized the data we have to inform our prognosis, including neurological exam, EEG, CT head, MRI brain. I explained that Alex's current state is consistent w/ coma. He has a few intact brainstem reflexes but otherwise completely unresponsive even off sedation. His CT head showed catastrophic injury which was corroborated with MRI showing global hypoxic injury, sparing essentially no cortical regions. EEG has a very suppressed background, consistent with no cortical activity.    I explained the differences between comatose, persistent vegetative state, and minimally conscious states. I shared that Alex may potentially reach persistent vegetative state, but I do not see a situation which he recovers more than that. He could also decompensate due to secondary medical complication and  anyway. I certainly cannot predict a scenario whereby Alex is able to interact with his environment meaningfully, including speaking with his family or even looking at them/paying attention.    Siblings at bedside expressed understanding. The confirmed that while Alex wanted to fight for his life, he had also told Radha that if he were going to be in vegetative state, he would not want to live that way. I affirmed that it is difficult when many different loved ones are bringing different hopes, understandings, and wishes to the situation, but we all need to think what Alex would have wanted. Bala (brother) explained that his mother is Religion and is likely holding back of proceeding with palliative withdrawal of life support. He ultimately is asking for more time and is unsure when a decision can be made.    We took a moment to remember Alex's life outside the hospital and the things he enjoyed doing, such as racing his car. 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

## 2025-03-31 NOTE — PROGRESS NOTE ADULT - SUBJECTIVE AND OBJECTIVE BOX
[facility] DIVISION OF KIDNEY DISEASES AND HYPERTENSION   FOLLOW UP NOTE    --------------------------------------------------------------------------------  Chief Complaint:    24 hour events/subjective: Pt. was seen and examined today.   Still intubated, in MICU, non-oliguric now    PAST HISTORY  --------------------------------------------------------------------------------  No significant changes to PMH, PSH, FHx, SHx, unless otherwise noted    ALLERGIES & MEDICATIONS  --------------------------------------------------------------------------------  Allergies    penicillin (Pruritus)  hydroxyurea (Other)  piperacillin-tazobactam (Urticaria)  ceftriaxone (Anaphylaxis)    Intolerances      Standing Inpatient Medications  albuterol/ipratropium for Nebulization 3 milliLiter(s) Nebulizer every 6 hours  allopurinol 50 milliGRAM(s) Oral <User Schedule>  chlorhexidine 0.12% Liquid 15 milliLiter(s) Oral Mucosa every 12 hours  chlorhexidine 2% Cloths 1 Application(s) Topical daily  folic acid 1 milliGRAM(s) Oral daily  influenza   Vaccine 0.5 milliLiter(s) IntraMuscular once  levETIRAcetam  Solution 500 milliGRAM(s) Oral two times a day  meropenem  IVPB 500 milliGRAM(s) IV Intermittent every 24 hours  mupirocin 2% Nasal 1 Application(s) Both Nostrils every 12 hours  petrolatum Ophthalmic Ointment 1 Application(s) Both EYES two times a day  propofol Infusion 50 MICROgram(s)/kG/Min IV Continuous <Continuous>    PRN Inpatient Medications  midazolam Injectable 2 milliGRAM(s) IV Push every 4 hours PRN      REVIEW OF SYSTEMS  --------------------------------------------------------------------------------  unable to obtain      VITALS/PHYSICAL EXAM  --------------------------------------------------------------------------------  T(C): 36.9 (03-31-25 @ 08:00), Max: 37.8 (03-30-25 @ 20:00)  HR: 93 (03-31-25 @ 11:36) (92 - 102)  BP: --  RR: 23 (03-31-25 @ 11:00) (20 - 24)  SpO2: 98% (03-31-25 @ 11:36) (92% - 100%)  Wt(kg): --        03-30-25 @ 07:01  -  03-31-25 @ 07:00  --------------------------------------------------------  IN: 815.2 mL / OUT: 1720 mL / NET: -904.8 mL        Physical Exam:  	Gen: Critically ill, intubated/sedated  	HEENT: +ETT  	Pulm: CTA B/L  	CV: S1S2+  	Abd: Soft, +BS   	Ext: +LE edema B/L, +UE edema b/l   	Neuro: Sedated  	Skin: Warm and dry  	Dialysis access:[facility] non-tunneled cath    LABS/STUDIES  --------------------------------------------------------------------------------              7.2    23.19 >-----------<  470      [03-31-25 @ 01:30]              22.0     134  |  94  |  71  ----------------------------<  108      [03-31-25 @ 01:30]  4.2   |  20  |  4.80        Ca     9.4     [03-31-25 @ 01:30]      iCa    1.22     [03-31 @ 01:30]      Mg     2.30     [03-31-25 @ 01:30]      Phos  7.0     [03-31-25 @ 01:30]    TPro  6.8  /  Alb  2.8  /  TBili  12.0  /  DBili  x   /  AST  94  /  ALT  32  /  AlkPhos  202  [03-31-25 @ 01:30]    PT/INR: PT 12.0 , INR 1.03       [03-31-25 @ 01:30]  PTT: 27.7       [03-31-25 @ 01:30]    Uric acid 7.3      [03-31-25 @ 01:30]        [03-30-25 @ 00:40]    Creatinine Trend:  SCr 4.80 [03-31 @ 01:30]  SCr 4.10 [03-30 @ 00:40]  SCr 5.39 [03-29 @ 00:15]  SCr 4.39 [03-28 @ 00:55]  SCr 5.04 [03-27 @ 01:45]    Urinalysis - [03-31-25 @ 01:30]      Color  / Appearance  / SG  / pH       Gluc 108 / Ketone   / Bili  / Urobili        Blood  / Protein  / Leuk Est  / Nitrite       RBC  / WBC  / Hyaline  / Gran  / Sq Epi  / Non Sq Epi  / Bacteria       HBsAb 50.9      [03-29-25 @ 07:15]  HBsAg Nonreact      [03-17-25 @ 15:26]  HBcAb Nonreact      [03-29-25 @ 07:15]  HCV 0.10, Nonreact      [03-17-25 @ 15:26]  HIV Nonreact      [03-17-25 @ 15:26]      Tacrolimus  Cyclosporine  Sirolimus  Mycophenolate  BK PCR  CMV PCRCMVPCR Log: NotDetec [id number]/mL (12-27 @ 05:38)    Parvo PCR  EBV PCR

## 2025-03-31 NOTE — PROGRESS NOTE ADULT - SUBJECTIVE AND OBJECTIVE BOX
MICU PROGRESS NOTE     HISTORY OF PRESENT ILLNESS     NIRAJ. Got straight cath yesterday 1020 mL. No recent HD since 3/29.     ROS: All negative except as listed above.    VITAL SIGNS:  ICU Vital Signs Last 24 Hrs  T(C): 37.4 (31 Mar 2025 04:00), Max: 37.8 (30 Mar 2025 20:00)  T(F): 99.3 (31 Mar 2025 04:00), Max: 100 (30 Mar 2025 20:00)  HR: 93 (31 Mar 2025 06:00) (86 - 102)  BP: --  BP(mean): --  ABP: 134/69 (31 Mar 2025 06:00) (117/55 - 142/73)  ABP(mean): 89 (31 Mar 2025 06:00) (75 - 95)  RR: 20 (31 Mar 2025 06:00) (20 - 24)  SpO2: 98% (31 Mar 2025 06:00) (91% - 100%)    O2 Parameters below as of 31 Mar 2025 04:00  Patient On (Oxygen Delivery Method): ventilator    O2 Concentration (%): 40      Mode: AC/ CMV (Assist Control/ Continuous Mandatory Ventilation), RR (machine): 20, TV (machine): 460, FiO2: 40, PEEP: 7, ITime: 0.77, MAP: 12, PIP: 26  Plateau pressure:   P/F ratio:     03-30 @ 07:01  -  03-31 @ 07:00  --------------------------------------------------------  IN: 815.2 mL / OUT: 1720 mL / NET: -904.8 mL      CAPILLARY BLOOD GLUCOSE          ECG: reviewed.    PHYSICAL EXAM:    General: NAD, normal habitus, mild jaundice   Neuro: AAOx0,sedated non responsive to noxious stimuli, closed eyes, no verbal sounds, 4 mm pupils, sluggishly reactive pupils b/l   HEENT: icteric sclerae b/l , increased watery discharge from eyes   Chest: nonTTP   Heart: S1/S2, RRR   Lungs: CTA b/l, on MV   Abd: soft, nonTTP, nondistended   Ext: generalized trace pitting edema, LUE with bullae and desquamation along forearm, nodule on R 1st digit proximal phalanges   Pulses: radial 2+ b/l, dorsalis pedis 2+ b/l   Lines/tubes/drains: A line L femoral, OG tube, L IJV, flexicel   Psych: unable to assess       MEDICATIONS  (STANDING):  allopurinol 50 milliGRAM(s) Oral <User Schedule>  chlorhexidine 0.12% Liquid 15 milliLiter(s) Oral Mucosa every 12 hours  chlorhexidine 2% Cloths 1 Application(s) Topical daily  folic acid 1 milliGRAM(s) Oral daily  influenza   Vaccine 0.5 milliLiter(s) IntraMuscular once  levETIRAcetam  Solution 500 milliGRAM(s) Oral two times a day  meropenem  IVPB 500 milliGRAM(s) IV Intermittent every 24 hours  mupirocin 2% Nasal 1 Application(s) Both Nostrils every 12 hours  petrolatum Ophthalmic Ointment 1 Application(s) Both EYES two times a day  propofol Infusion 50 MICROgram(s)/kG/Min (26.6 mL/Hr) IV Continuous <Continuous>    MEDICATIONS  (PRN):  midazolam Injectable 2 milliGRAM(s) IV Push every 4 hours PRN tremors      ALLERGIES:  Allergies    penicillin (Pruritus)  hydroxyurea (Other)  piperacillin-tazobactam (Urticaria)  ceftriaxone (Anaphylaxis)    Intolerances        LABS:                        7.2    23.19 )-----------( 470      ( 31 Mar 2025 01:30 )             22.0     03-31    134[L]  |  94[L]  |  71[H]  ----------------------------<  108[H]  4.2   |  20[L]  |  4.80[H]    Ca    9.4      31 Mar 2025 01:30  Phos  7.0     03-31  Mg     2.30     03-31    TPro  6.8  /  Alb  2.8[L]  /  TBili  12.0[H]  /  DBili  x   /  AST  94[H]  /  ALT  32  /  AlkPhos  202[H]  03-31    PT/INR - ( 31 Mar 2025 01:30 )   PT: 12.0 sec;   INR: 1.03 ratio         PTT - ( 31 Mar 2025 01:30 )  PTT:27.7 sec  Urinalysis Basic - ( 31 Mar 2025 01:30 )    Color: x / Appearance: x / SG: x / pH: x  Gluc: 108 mg/dL / Ketone: x  / Bili: x / Urobili: x   Blood: x / Protein: x / Nitrite: x   Leuk Esterase: x / RBC: x / WBC x   Sq Epi: x / Non Sq Epi: x / Bacteria: x      ABG:  pH, Arterial: 7.41 (03-31-25 @ 01:30)  pCO2, Arterial: 36 mmHg (03-31-25 @ 01:30)  pO2, Arterial: 149 mmHg (03-31-25 @ 01:30)      vBG:    Micro:    Culture - Blood (collected 03-20-25 @ 10:04)  Source: Blood Blood-Peripheral  Final Report (03-25-25 @ 13:01):    No growth at 5 days        Culture - Sputum (collected 03-26-25 @ 11:45)  Source: ET Tube ET Tube  Gram Stain (03-26-25 @ 22:28):    Few Squamous epithelial cells per low power field    Moderate polymorphonuclear leukocytes per low power field    Few Yeast per oil power field  Final Report (03-28-25 @ 08:15):    Commensal lay consistent with body site    Culture - Sputum (collected 03-22-25 @ 17:15)  Source: ET Tube ET Tube  Gram Stain (03-22-25 @ 23:42):    Numerous polymorphonuclear leukocytes per low power field    Rare Squamous epithelial cells per low power field    No organisms seen per oil power field  Final Report (03-24-25 @ 06:52):    Commensal lay consistent with body site        RADIOLOGY & ADDITIONAL TESTS: Reviewed. MICU PROGRESS NOTE     HISTORY OF PRESENT ILLNESS     NIRAJ. Got straight cath yesterday 1020 mL. No recent HD since 3/29. Afebrile, HR 80s-100s. SBPs 120s-140s. MV V A/C RR 20  PEEP 7 FiO2 40. Triggering ventilator.     Seen and examined at bedside, continues to be unable to engage meaningfully. Appears comfortable.     ROS: All negative except as listed above.    VITAL SIGNS:  ICU Vital Signs Last 24 Hrs  T(C): 37.4 (31 Mar 2025 04:00), Max: 37.8 (30 Mar 2025 20:00)  T(F): 99.3 (31 Mar 2025 04:00), Max: 100 (30 Mar 2025 20:00)  HR: 93 (31 Mar 2025 06:00) (86 - 102)  BP: --  BP(mean): --  ABP: 134/69 (31 Mar 2025 06:00) (117/55 - 142/73)  ABP(mean): 89 (31 Mar 2025 06:00) (75 - 95)  RR: 20 (31 Mar 2025 06:00) (20 - 24)  SpO2: 98% (31 Mar 2025 06:00) (91% - 100%)    O2 Parameters below as of 31 Mar 2025 04:00  Patient On (Oxygen Delivery Method): ventilator    O2 Concentration (%): 40      Mode: AC/ CMV (Assist Control/ Continuous Mandatory Ventilation), RR (machine): 20, TV (machine): 460, FiO2: 40, PEEP: 7, ITime: 0.77, MAP: 12, PIP: 26  Plateau pressure:   P/F ratio:     03-30 @ 07:01  -  03-31 @ 07:00  --------------------------------------------------------  IN: 815.2 mL / OUT: 1720 mL / NET: -904.8 mL      CAPILLARY BLOOD GLUCOSE          ECG: reviewed.    PHYSICAL EXAM:    General: NAD, normal habitus, mild jaundice   Neuro: AAOx0,sedated non responsive to noxious stimuli, closed eyes, no verbal sounds, 4 mm pupils, sluggishly reactive pupils b/l   HEENT: icteric sclerae b/l , increased watery discharge from eyes   Chest: nonTTP   Heart: S1/S2, RRR   Lungs: CTA b/l, on MV   Abd: soft, nonTTP, nondistended   Ext: generalized trace pitting edema, LUE with bullae and desquamation along forearm, nodule on R 1st digit proximal phalanges   Pulses: radial 2+ b/l, dorsalis pedis 2+ b/l   Lines/tubes/drains: A line L femoral, OG tube, L IJV, flexicel   Psych: unable to assess       MEDICATIONS  (STANDING):  allopurinol 50 milliGRAM(s) Oral <User Schedule>  chlorhexidine 0.12% Liquid 15 milliLiter(s) Oral Mucosa every 12 hours  chlorhexidine 2% Cloths 1 Application(s) Topical daily  folic acid 1 milliGRAM(s) Oral daily  influenza   Vaccine 0.5 milliLiter(s) IntraMuscular once  levETIRAcetam  Solution 500 milliGRAM(s) Oral two times a day  meropenem  IVPB 500 milliGRAM(s) IV Intermittent every 24 hours  mupirocin 2% Nasal 1 Application(s) Both Nostrils every 12 hours  petrolatum Ophthalmic Ointment 1 Application(s) Both EYES two times a day  propofol Infusion 50 MICROgram(s)/kG/Min (26.6 mL/Hr) IV Continuous <Continuous>    MEDICATIONS  (PRN):  midazolam Injectable 2 milliGRAM(s) IV Push every 4 hours PRN tremors      ALLERGIES:  Allergies    penicillin (Pruritus)  hydroxyurea (Other)  piperacillin-tazobactam (Urticaria)  ceftriaxone (Anaphylaxis)    Intolerances        LABS:                        7.2    23.19 )-----------( 470      ( 31 Mar 2025 01:30 )             22.0     03-31    134[L]  |  94[L]  |  71[H]  ----------------------------<  108[H]  4.2   |  20[L]  |  4.80[H]    Ca    9.4      31 Mar 2025 01:30  Phos  7.0     03-31  Mg     2.30     03-31    TPro  6.8  /  Alb  2.8[L]  /  TBili  12.0[H]  /  DBili  x   /  AST  94[H]  /  ALT  32  /  AlkPhos  202[H]  03-31    PT/INR - ( 31 Mar 2025 01:30 )   PT: 12.0 sec;   INR: 1.03 ratio         PTT - ( 31 Mar 2025 01:30 )  PTT:27.7 sec  Urinalysis Basic - ( 31 Mar 2025 01:30 )    Color: x / Appearance: x / SG: x / pH: x  Gluc: 108 mg/dL / Ketone: x  / Bili: x / Urobili: x   Blood: x / Protein: x / Nitrite: x   Leuk Esterase: x / RBC: x / WBC x   Sq Epi: x / Non Sq Epi: x / Bacteria: x      ABG:  pH, Arterial: 7.41 (03-31-25 @ 01:30)  pCO2, Arterial: 36 mmHg (03-31-25 @ 01:30)  pO2, Arterial: 149 mmHg (03-31-25 @ 01:30)      vBG:    Micro:    Culture - Blood (collected 03-20-25 @ 10:04)  Source: Blood Blood-Peripheral  Final Report (03-25-25 @ 13:01):    No growth at 5 days        Culture - Sputum (collected 03-26-25 @ 11:45)  Source: ET Tube ET Tube  Gram Stain (03-26-25 @ 22:28):    Few Squamous epithelial cells per low power field    Moderate polymorphonuclear leukocytes per low power field    Few Yeast per oil power field  Final Report (03-28-25 @ 08:15):    Commensal lay consistent with body site    Culture - Sputum (collected 03-22-25 @ 17:15)  Source: ET Tube ET Tube  Gram Stain (03-22-25 @ 23:42):    Numerous polymorphonuclear leukocytes per low power field    Rare Squamous epithelial cells per low power field    No organisms seen per oil power field  Final Report (03-24-25 @ 06:52):    Commensal lay consistent with body site        RADIOLOGY & ADDITIONAL TESTS: Reviewed.

## 2025-03-31 NOTE — PROGRESS NOTE ADULT - ASSESSMENT
Patient is 44y Male with PMHx of sickle cell disease admitted for anemia concerning for sickle cell crisis s/p ureteroscopy w/ L kidney biopsy 3/19, post-operative course c/b acute hemorrhagic shock, PEA arrest with ROSC; currently with acute renal failure on HD and global anoxic brain injury and urothelial cancer and ongoing goals of care.       NEURO:  #Global anoxic brain injury   AAOx0. On sedation with no further myoclonus Guarded prognosis.   - On propofol; wean as tolerated   - D/c fenatnyl   - S/p versed gtt, weaned off 3/23  - Repeat CT B 3/24 showing diffuse sulcal effacement with increased intracrnail pressure, and few patchy foci of cortical blurring   - 3/25 MRIB with diffuse global abnormal supratentorial cortical restricted diffusion consistent with global hypoxic injury   - Neurology made aware family interested in reviewing MRIB in detail for GOC       #Seizures  No further cyoclonus of upper body after restarting propofol.   - Witness myoclonic jerking concerning for seizures iso anoxic brain injury  - s/p 2.5g keppra load  - vEEG report: "Abundant myoclonic seizures in the setting of a severe diffuse/multifocal cerebral dysfunction which can be seen in the setting of sedative effect or due to an anoxic injury, with improvement after 20:50 suggesting a lowering of sedation"; will f/u with neuro recommendations  -C/w levetiracetam 500 mg po BID   - Trend enolase   - repeat vEEG 3/24 showing severe cerebral dysfunction   -C/w midazolam 2mg IV q8 PRN       CV:  #Shock - hemorrhagic shock s/p kidney biopsy s/p 5u pRBC, 3 plasma, 3 plt- REOSOLVED   Hemodynamically stable.   last dose of lovenox AC on 3/18 at 5PM   Hgb 8 -> ~3   - Not on levophed, MAP goal>65  - Monitor cbc q24  - Midodrine d/c'ed 03/30      #Hx of VTE (R IJ thrombus, L brachial DVT)  - CTM, hold AC given hemorrhage  - Bullae present on arm with DVT,  - Appreciate orthopedics hand surgery consult, not compartment syndrome       PULM:  #Acute hypoxic respiratory failure  In the setting of PEA arrest. Triggering ventilator on CPAP.   CXR with R lung field and left mid and lower lung field hazy opacities.  - Abx as below  - Wean vent as able  - HTS nebs, duonebs  - 3/28 CXR showing increasing RUL consolidation       RENAL:  #Acute renal failure   #Renal mass s/p biopsy  #Acidosis  Anuric.  On hemodialysis. Remains hypervolemic.    - TOV 3/28   - Trend Cr, uop, lytes  - S/p bicarb drip  - Per nephro, HD session #1 3/24, HD sessions #2 3/25, HD session #3 3/27; planning HD #4 3/29   - Per pathology result of renal biopsy, noninvasive urothelial malignancy       # urinary Incontinence  - Episode overnight. Patient was previously anuric  Plan:  - Bladder scan and straight cath PRN  - Continue HD as above      GI:  #Shock liver  Downtrending LFTs. Jaundice with hyperbilirubinemia (direct).   - Trend liver enzymes  - RUQ US showing enlarged periportal and periaortic enlarged lymph nodes and enlarged left lateral liver lobe and CBD 9 mm.       #Diet:  - NPO w/ tube feed. Changing to trickle feeds now due to increased residuals     HEMATOLOGIC/ONCOLGOGIC   #Sickle cell crisis  #Acute blood loss enamia  Elevated indirect hyperbilirubinemia, likely from  peripheral hemolysis.   - heme following   -D/c deferasirox due to current renal failure     #Urothelial cancer in kidney   -Biopsy showing nonivasive urothelial malignancy on renal biopsy     #DVT prophylaxis - SCD    Endo:  #JUAN    ID:  Persistent fever and uptrending leukocytosis. Bullae and desquamation without active signs of infection in LUE.   - D/c  aztreonam 2000 mg IV qd ( started 3/22- 3/27)   -C/w  meropenem 500 mg IV qd (started 3/27-3/31)     MSK:  #infiltration  Infiltration of sodium bicarb into left arm, now with arm distension and bullae. Elevated uric acid with nodule suggegstive of podagra however no signs of active gout flare.   -Appreciate orthopedic hand surgery, not compartment syndrome   - CTM    Ethics: Full Code   -Had family meeting with neurology, MICU team; ongoing goals of care conversation   -Appreciate palliative recommendations, will continue to have conversations  -Family interested in reviewing MRIB with neurology to understand       Patient is 44y Male with PMHx of sickle cell disease admitted for anemia concerning for sickle cell crisis s/p ureteroscopy w/ L kidney biopsy 3/19, post-operative course c/b acute hemorrhagic shock, PEA arrest with ROSC; currently with acute renal failure on HD and global anoxic brain injury and urothelial cancer and ongoing goals of care.       NEURO:  #Global anoxic brain injury   AAOx0. On sedation with no further myoclonus Guarded prognosis.   - On propofol; wean as tolerated   - D/c fenatnyl   - S/p versed gtt, weaned off 3/23  - Repeat CT B 3/24 showing diffuse sulcal effacement with increased intracrnail pressure, and few patchy foci of cortical blurring   - 3/25 MRIB with diffuse global abnormal supratentorial cortical restricted diffusion consistent with global hypoxic injury   - Neurology made aware family interested in reviewing MRIB in detail for GOC       #Seizures  No further cyoclonus of upper body after restarting propofol.   - Witness myoclonic jerking concerning for seizures iso anoxic brain injury  - s/p 2.5g keppra load  - vEEG report: "Abundant myoclonic seizures in the setting of a severe diffuse/multifocal cerebral dysfunction which can be seen in the setting of sedative effect or due to an anoxic injury, with improvement after 20:50 suggesting a lowering of sedation"; will f/u with neuro recommendations  -C/w levetiracetam 500 mg po BID   - Trend enolase   - repeat vEEG 3/24 showing severe cerebral dysfunction   -C/w midazolam 2mg IV q8 PRN       CV:  #Shock - hemorrhagic shock s/p kidney biopsy s/p 5u pRBC, 3 plasma, 3 plt- REOSOLVED   Hemodynamically stable.   last dose of lovenox AC on 3/18 at 5PM   Hgb 8 -> ~3   - Not on levophed, MAP goal>65  - Monitor cbc q24  - Midodrine d/c'ed 03/30      #Hx of VTE (R IJ thrombus, L brachial DVT)  - CTM, hold AC given hemorrhage  - Bullae present on arm with DVT,  - Appreciate orthopedics hand surgery consult, not compartment syndrome       PULM:  #Acute hypoxic respiratory failure  In the setting of PEA arrest. Triggering ventilator on CPAP.   CXR with R lung field and left mid and lower lung field hazy opacities.  - Abx as below  - Wean vent as able  - HTS nebs, duonebs  - 3/28 CXR showing increasing RUL consolidation       RENAL:  #Acute renal failure   #Renal mass s/p biopsy  #Acidosis  Oliguric. On hemodialysis. Remains hypervolemic.    - TOV 3/28   - Trend Cr, uop, lytes  - S/p bicarb drip  - Per nephro, HD session #1 3/24, HD sessions #2 3/25, HD session #3 3/27; planning HD #4 3/29   - Planning to remove L IJV   - Per pathology result of renal biopsy, noninvasive urothelial malignancy       # urinary Incontinence  - Episode overnight. Patient was previously anuric  Plan:  - Bladder scan and straight cath PRN      GI:  #Shock liver  Downtrending LFTs. Jaundice with hyperbilirubinemia (direct).   - Trend liver enzymes  - RUQ US showing enlarged periportal and periaortic enlarged lymph nodes and enlarged left lateral liver lobe and CBD 9 mm.       #Diet:  - NPO w/ tube feed. Changing to trickle feeds now due to increased residuals     HEMATOLOGIC/ONCOLGOGIC   #Sickle cell crisis  #Acute blood loss enamia  Elevated indirect hyperbilirubinemia, likely from  peripheral hemolysis.   - heme following   -D/c deferasirox due to current renal failure     #Urothelial cancer in kidney   -Biopsy showing nonivasive urothelial malignancy on renal biopsy     #DVT prophylaxis - SCD    Endo:  #JUAN    ID:  #Pneumoniae-  likely 2/2 ventilator related   Afebrile with unchanged leukocytosis. Bullae and desquamation without active signs of infection in LUE.   - D/c  aztreonam 2000 mg IV qd ( started 3/22- 3/27)   -C/w  meropenem 500 mg IV qd (started 3/27-3/31)   - CXR showing RUL consolidation    MSK:  #infiltration  Infiltration of sodium bicarb into left arm, now with arm distension and bullae. Elevated uric acid with nodule suggegstive of podagra however no signs of active gout flare.   -Appreciate orthopedic hand surgery, not compartment syndrome   - CTM    Ethics: Full Code   -Had family meeting with neurology, MICU team; ongoing goals of care conversation   -Appreciate palliative recommendations, will continue to have conversations  -Family interested in reviewing MRIB with neurology to understand

## 2025-03-31 NOTE — PROGRESS NOTE ADULT - PROBLEM SELECTOR PLAN 1
Pt w/ oliguric MATA iso hemorrhagic shock and PEA arrest. Likely ATN. UA (3/16) w/ proteinuria and hematuria (21 RBC). Renal US (3/20) w/o hydronephrosis, and w/ large left renal subcapsular hematoma. CXR (3/22) w/ b/l hazy opacities. MRI w/ global hypoxic injury.   -NTDC placed and underwent HD on 3/24 iso worsening oliguric renal failure, w/ associated hyperkalemia and hypervolemia. Anuric despite prior diuretic challenge. Last HD 3/29, tolerated well.     -Electrolytes acceptable. Pt now non-oliguric with ~ 1L/24hr. No objection to removing NTDC. Will monitor for now.       Eldon Hamilton  Nephrology Fellow  Feel free to contact me on TEAMS  After 5 pm and on weekends please contact the on-call Fellow.

## 2025-03-31 NOTE — PROGRESS NOTE ADULT - ATTENDING COMMENTS
MATA   Oliguria    Pt making more urine today  Monitor labs and Is/Os, daily weights  Will assess for HD needs in AM

## 2025-03-31 NOTE — PROGRESS NOTE ADULT - ATTENDING COMMENTS
Patient is a 45 yo M w/ SSD, UE DVT, gout, HTN, with L renal mass admitted with acute anemia/ sickle cell crisis and evaluation of his left renal mass s/p left ureteroscopy and biopsy (3/19/25) with 24 hour post operative course complicated by rapid responses for hypoglycemia c/b cardiac arrest x 4 minutes CPR/1 epi with ROSC, transferred to MICU for further care, found to have anoxic brain injury    MRI showing anoxia. 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     # Cardiac arrest  # Anoxic encephalopathy  # SZ vs myoclonus  # Shock, resolved  # Anemia  # SSD  # Transaminitis  # MATA 2/2 to ATN  # DVT  # Hyperbilirubinemia, Early cirrhosis?  # Aspiration pneumonia  # Urothelial Cancer   - c/w Keppra   - c/w Propofol for now for severe myoclonus  - f/u neuro for prognostication   - c/w mechanical ventilatory support  - Add duonebs for airway clearance, moderate secretions  - Monitor BMP, good UOP but creatinine uptrending. f/u renal re further dialysis. [facility]  - Transfuse PRN, now s/p embolization by IR  - Monitor hemolysis labs  - Early cirrhosis on US. Trend bili  - c/w empiric Jon   - Hand lesions. Initially consulted vascular, vascular recommending plastics/hand--> no surgical interventions.   - Biopsy showing Non-invasive papillary urothelial carcinoma. Further management based on going GOC.   - Will f/u GOC re next steps with family    Zaid Bartholomew MD  Pulmonary & Critical Care

## 2025-04-01 LAB
ALBUMIN SERPL ELPH-MCNC: 2.5 G/DL — LOW (ref 3.3–5)
ALP SERPL-CCNC: 188 U/L — HIGH (ref 40–120)
ALT FLD-CCNC: 29 U/L — SIGNIFICANT CHANGE UP (ref 4–41)
ANION GAP SERPL CALC-SCNC: 20 MMOL/L — HIGH (ref 7–14)
ANISOCYTOSIS BLD QL: SIGNIFICANT CHANGE UP
APTT BLD: 28.7 SEC — SIGNIFICANT CHANGE UP (ref 24.5–35.6)
AST SERPL-CCNC: 86 U/L — HIGH (ref 4–40)
BASOPHILS # BLD AUTO: 0.2 K/UL — SIGNIFICANT CHANGE UP (ref 0–0.2)
BASOPHILS NFR BLD AUTO: 0.9 % — SIGNIFICANT CHANGE UP (ref 0–2)
BILIRUB DIRECT SERPL-MCNC: 10 MG/DL — HIGH (ref 0–0.3)
BILIRUB INDIRECT FLD-MCNC: 1.4 MG/DL — HIGH (ref 0–1)
BILIRUB SERPL-MCNC: 11.4 MG/DL — HIGH (ref 0.2–1.2)
BILIRUB SERPL-MCNC: 11.4 MG/DL — HIGH (ref 0.2–1.2)
BLD GP AB SCN SERPL QL: NEGATIVE — SIGNIFICANT CHANGE UP
BLOOD GAS ARTERIAL - LYTES,HGB,ICA,LACT RESULT: SIGNIFICANT CHANGE UP
BUN SERPL-MCNC: 77 MG/DL — HIGH (ref 7–23)
CA-I BLD-SCNC: 1.14 MMOL/L — LOW (ref 1.15–1.29)
CALCIUM SERPL-MCNC: 9.2 MG/DL — SIGNIFICANT CHANGE UP (ref 8.4–10.5)
CHLORIDE SERPL-SCNC: 97 MMOL/L — LOW (ref 98–107)
CK SERPL-CCNC: 12 U/L — LOW (ref 30–200)
CO2 SERPL-SCNC: 18 MMOL/L — LOW (ref 22–31)
CREAT SERPL-MCNC: 5.23 MG/DL — HIGH (ref 0.5–1.3)
DACRYOCYTES BLD QL SMEAR: SLIGHT — SIGNIFICANT CHANGE UP
EGFR: 13 ML/MIN/1.73M2 — LOW
EGFR: 13 ML/MIN/1.73M2 — LOW
EOSINOPHIL # BLD AUTO: 0.2 K/UL — SIGNIFICANT CHANGE UP (ref 0–0.5)
EOSINOPHIL NFR BLD AUTO: 0.9 % — SIGNIFICANT CHANGE UP (ref 0–6)
GIANT PLATELETS BLD QL SMEAR: PRESENT — SIGNIFICANT CHANGE UP
GLUCOSE SERPL-MCNC: 105 MG/DL — HIGH (ref 70–99)
HAPTOGLOB SERPL-MCNC: <20 MG/DL — LOW (ref 34–200)
HCT VFR BLD CALC: 20 % — CRITICAL LOW (ref 39–50)
HGB BLD-MCNC: 6.7 G/DL — CRITICAL LOW (ref 13–17)
IANC: 17.7 K/UL — HIGH (ref 1.8–7.4)
INR BLD: 1.04 RATIO — SIGNIFICANT CHANGE UP (ref 0.85–1.16)
LDH SERPL L TO P-CCNC: 617 U/L — HIGH (ref 135–225)
LYMPHOCYTES # BLD AUTO: 1.54 K/UL — SIGNIFICANT CHANGE UP (ref 1–3.3)
LYMPHOCYTES # BLD AUTO: 7 % — LOW (ref 13–44)
MACROCYTES BLD QL: SLIGHT — SIGNIFICANT CHANGE UP
MANUAL SMEAR VERIFICATION: SIGNIFICANT CHANGE UP
MCHC RBC-ENTMCNC: 31.2 PG — SIGNIFICANT CHANGE UP (ref 27–34)
MCHC RBC-ENTMCNC: 33.5 G/DL — SIGNIFICANT CHANGE UP (ref 32–36)
MCV RBC AUTO: 81.4 FL — SIGNIFICANT CHANGE UP (ref 80–100)
MICROCYTES BLD QL: SLIGHT — SIGNIFICANT CHANGE UP
MONOCYTES # BLD AUTO: 1.35 K/UL — HIGH (ref 0–0.9)
MONOCYTES NFR BLD AUTO: 6.1 % — SIGNIFICANT CHANGE UP (ref 2–14)
NEUTROPHILS # BLD AUTO: 18.38 K/UL — HIGH (ref 1.8–7.4)
NEUTROPHILS NFR BLD AUTO: 83.3 % — HIGH (ref 43–77)
OVALOCYTES BLD QL SMEAR: SLIGHT — SIGNIFICANT CHANGE UP
PHOSPHATE SERPL-MCNC: 9.3 MG/DL — HIGH (ref 2.5–4.5)
PLAT MORPH BLD: NORMAL — SIGNIFICANT CHANGE UP
PLATELET # BLD AUTO: 436 K/UL — HIGH (ref 150–400)
PLATELET COUNT - ESTIMATE: NORMAL — SIGNIFICANT CHANGE UP
POIKILOCYTOSIS BLD QL AUTO: SLIGHT — SIGNIFICANT CHANGE UP
POLYCHROMASIA BLD QL SMEAR: SLIGHT — SIGNIFICANT CHANGE UP
POTASSIUM SERPL-MCNC: 4.3 MMOL/L — SIGNIFICANT CHANGE UP (ref 3.5–5.3)
POTASSIUM SERPL-SCNC: 4.3 MMOL/L — SIGNIFICANT CHANGE UP (ref 3.5–5.3)
PROT SERPL-MCNC: 6.8 G/DL — SIGNIFICANT CHANGE UP (ref 6–8.3)
PROTHROM AB SERPL-ACNC: 12.1 SEC — SIGNIFICANT CHANGE UP (ref 9.9–13.4)
RBC # BLD: 2.15 M/UL — LOW (ref 4.2–5.8)
RBC # BLD: 2.15 M/UL — LOW (ref 4.2–5.8)
RBC # FLD: 19.3 % — HIGH (ref 10.3–14.5)
RBC BLD AUTO: ABNORMAL
RETICS #: 141.3 K/UL — HIGH (ref 25–125)
RETICS/RBC NFR: 6.5 % — HIGH (ref 0.5–2.5)
RH IG SCN BLD-IMP: POSITIVE — SIGNIFICANT CHANGE UP
SICKLE CELLS BLD QL SMEAR: SLIGHT — SIGNIFICANT CHANGE UP
SODIUM SERPL-SCNC: 135 MMOL/L — SIGNIFICANT CHANGE UP (ref 135–145)
TARGETS BLD QL SMEAR: SIGNIFICANT CHANGE UP
VARIANT LYMPHS # BLD: 1.8 % — SIGNIFICANT CHANGE UP (ref 0–6)
VARIANT LYMPHS NFR BLD MANUAL: 1.8 % — SIGNIFICANT CHANGE UP (ref 0–6)
WBC # BLD: 22.07 K/UL — HIGH (ref 3.8–10.5)
WBC # FLD AUTO: 22.07 K/UL — HIGH (ref 3.8–10.5)

## 2025-04-01 PROCEDURE — 99498 ADVNCD CARE PLAN ADDL 30 MIN: CPT

## 2025-04-01 PROCEDURE — 99233 SBSQ HOSP IP/OBS HIGH 50: CPT | Mod: GC

## 2025-04-01 PROCEDURE — 99497 ADVNCD CARE PLAN 30 MIN: CPT | Mod: 25

## 2025-04-01 PROCEDURE — 99232 SBSQ HOSP IP/OBS MODERATE 35: CPT | Mod: 25

## 2025-04-01 PROCEDURE — 99291 CRITICAL CARE FIRST HOUR: CPT | Mod: GC

## 2025-04-01 RX ORDER — ACETAMINOPHEN 500 MG/5ML
650 LIQUID (ML) ORAL ONCE
Refills: 0 | Status: COMPLETED | OUTPATIENT
Start: 2025-04-01 | End: 2025-04-01

## 2025-04-01 RX ORDER — TAMSULOSIN HYDROCHLORIDE 0.4 MG/1
0.4 CAPSULE ORAL AT BEDTIME
Refills: 0 | Status: DISCONTINUED | OUTPATIENT
Start: 2025-04-01 | End: 2025-04-01

## 2025-04-01 RX ORDER — DOXAZOSIN MESYLATE 8 MG/1
1 TABLET ORAL AT BEDTIME
Refills: 0 | Status: DISCONTINUED | OUTPATIENT
Start: 2025-04-01 | End: 2025-04-12

## 2025-04-01 RX ADMIN — Medication 15 MILLILITER(S): at 05:40

## 2025-04-01 RX ADMIN — IPRATROPIUM BROMIDE AND ALBUTEROL SULFATE 3 MILLILITER(S): .5; 2.5 SOLUTION RESPIRATORY (INHALATION) at 14:48

## 2025-04-01 RX ADMIN — Medication 1 APPLICATION(S): at 05:41

## 2025-04-01 RX ADMIN — PROPOFOL 26.6 MICROGRAM(S)/KG/MIN: 10 INJECTION, EMULSION INTRAVENOUS at 19:43

## 2025-04-01 RX ADMIN — IPRATROPIUM BROMIDE AND ALBUTEROL SULFATE 3 MILLILITER(S): .5; 2.5 SOLUTION RESPIRATORY (INHALATION) at 03:36

## 2025-04-01 RX ADMIN — Medication 15 MILLILITER(S): at 17:00

## 2025-04-01 RX ADMIN — MUPIROCIN CALCIUM 1 APPLICATION(S): 20 CREAM TOPICAL at 05:41

## 2025-04-01 RX ADMIN — FOLIC ACID 1 MILLIGRAM(S): 1 TABLET ORAL at 11:07

## 2025-04-01 RX ADMIN — IPRATROPIUM BROMIDE AND ALBUTEROL SULFATE 3 MILLILITER(S): .5; 2.5 SOLUTION RESPIRATORY (INHALATION) at 21:13

## 2025-04-01 RX ADMIN — Medication 1 APPLICATION(S): at 17:00

## 2025-04-01 RX ADMIN — Medication 650 MILLIGRAM(S): at 20:15

## 2025-04-01 RX ADMIN — DOXAZOSIN MESYLATE 1 MILLIGRAM(S): 8 TABLET ORAL at 21:01

## 2025-04-01 RX ADMIN — MUPIROCIN CALCIUM 1 APPLICATION(S): 20 CREAM TOPICAL at 17:01

## 2025-04-01 RX ADMIN — Medication 1 APPLICATION(S): at 11:08

## 2025-04-01 RX ADMIN — PROPOFOL 26.6 MICROGRAM(S)/KG/MIN: 10 INJECTION, EMULSION INTRAVENOUS at 07:28

## 2025-04-01 RX ADMIN — LEVETIRACETAM 500 MILLIGRAM(S): 10 INJECTION, SOLUTION INTRAVENOUS at 05:40

## 2025-04-01 RX ADMIN — PROPOFOL 26.6 MICROGRAM(S)/KG/MIN: 10 INJECTION, EMULSION INTRAVENOUS at 17:01

## 2025-04-01 RX ADMIN — IPRATROPIUM BROMIDE AND ALBUTEROL SULFATE 3 MILLILITER(S): .5; 2.5 SOLUTION RESPIRATORY (INHALATION) at 09:32

## 2025-04-01 RX ADMIN — Medication 650 MILLIGRAM(S): at 20:45

## 2025-04-01 RX ADMIN — LEVETIRACETAM 500 MILLIGRAM(S): 10 INJECTION, SOLUTION INTRAVENOUS at 17:00

## 2025-04-01 NOTE — PROGRESS NOTE ADULT - ATTENDING COMMENTS
MATA  ATN    Rising Cr however pt is making urine. No urgent indications for HD today, continue supportive measures.  Will monitor labs and Is/Os, assess daily for HD needs.

## 2025-04-01 NOTE — PROGRESS NOTE ADULT - PROBLEM SELECTOR PLAN 5
FULL CODE - see separate goc note   Patient's brother, Bala and sister, Radha are patient's surrogate decision makers. Pt's cousin, Donal, is also very involved in patient's care. Family appears to make decisions together.    Caregiver SW referral   Chaplaincy referral- .  > 3/27: Multiple friends and family coming to visit patient. Per discussion with MICU team, family needs more time before making any further decisions. Emotional support provided to friends/family at bedside.  > 3/28: Only cousins at bedside. Emotional support provided. MICU team notified palliative team about potential family meeting next week so that the family can have the weekend to think about next steps.  > 4/1: See goc note above. FULL CODE- pending further goc discussions and family meeting when Bala can coordinate a time.

## 2025-04-01 NOTE — PROGRESS NOTE ADULT - SUBJECTIVE AND OBJECTIVE BOX
[facility] Geriatrics and Palliative Care  Eve Tavares Palliative Care Attending  Contact Info: Page [phone] (including Nights/Weekends), message on Microsoft Teams (Eve Tavares), or leave  at Palliative Office [phone] (non-urgent)   Date of [id number] @ 16:36    SUBJECTIVE AND OBJECTIVE: Patient seen this afternoon with multiple family members at bedside including brother, Bala and sister, Radha. Patient remains intubated, off sedation. NO change in clinical status.     Indication for Geriatrics and Palliative Care Services/INTERVAL HPI: complex goc in setting of SCD c/b PEA arrest with anoxic brain injury     OVERNIGHT EVENTS:  > 4/1: Reviewed GOC note from Neurology team.   > 3/28: Patient with persistent fevers.   > 3/27: Patient febrile overnight s/p cooling blankets and tylenol.     DNR on chart:  Allergies    penicillin (Pruritus)  hydroxyurea (Other)  piperacillin-tazobactam (Urticaria)  ceftriaxone (Anaphylaxis)    Intolerances    MEDICATIONS  (STANDING):  albuterol/ipratropium for Nebulization 3 milliLiter(s) Nebulizer every 6 hours  allopurinol 50 milliGRAM(s) Oral <User Schedule>  chlorhexidine 0.12% Liquid 15 milliLiter(s) Oral Mucosa every 12 hours  chlorhexidine 2% Cloths 1 Application(s) Topical daily  doxazosin 1 milliGRAM(s) Oral at bedtime  folic acid 1 milliGRAM(s) Oral daily  influenza   Vaccine 0.5 milliLiter(s) IntraMuscular once  levETIRAcetam  Solution 500 milliGRAM(s) Oral two times a day  mupirocin 2% Nasal 1 Application(s) Both Nostrils every 12 hours  petrolatum Ophthalmic Ointment 1 Application(s) Both EYES two times a day  propofol Infusion 50 MICROgram(s)/kG/Min (26.6 mL/Hr) IV Continuous <Continuous>    MEDICATIONS  (PRN):  midazolam Injectable 2 milliGRAM(s) IV Push every 4 hours PRN tremors      ITEMS UNCHECKED ARE NOT PRESENT    PRESENT SYMPTOMS: [x ]Unable to self-report - see [ ] CPOT [x ] PAINADS [x ] RDOS  Source if other than patient:  [ ]Family   [ ]Team     Pain:  [ ]yes [ ]no  QOL impact -   Location -                    Aggravating factors -  Quality -  Radiation -  Timing-  Severity (0-10 scale):  Minimal acceptable level/ pain goal (0-10 scale):     CPOT:    https://www.[email or web address]/getattachment/[email or web address]/Critical-Care-Pain-Observation-Tool-(CPOT)    Dyspnea:                           [ ]Mild [ ]Moderate [ ]Severe  Anxiety:                             [ ]Mild [ ]Moderate [ ]Severe  Fatigue:                             [ ]Mild [ ]Moderate [ ]Severe  Nausea:                             [ ]Mild [ ]Moderate [ ]Severe  Loss of appetite:              [ ]Mild [ ]Moderate [ ]Severe  Constipation:                    [ ]Mild [ ]Moderate [ ]Severe  Other Symptoms:  [ ]All other review of systems negative     PCSSQ[Palliative Care Spiritual Screening Question]   Severity (0-10):  Score of 4 or > indicate consideration of Chaplaincy referral.  Chaplaincy Referral: [ x] yes [ ] refused [ ] following [ ] deferred    Caregiver [place]? : [x ] yes [ ] no [ ] Deferred [ ] Declined             Social work referral [x ] Patient & Family Centered Care Referral [ ]  Anticipatory Grief present?:  [x ] yes [ ] no  [ ] Deferred                  Social work referral [x ] Patient & Family Centered Care Referral [ ]      PHYSICAL EXAM:  Vital Signs Last 24 Hrs  T(C): 37.2 (01 Apr 2025 16:00), Max: 37.8 (01 Apr 2025 03:45)  T(F): 99 (01 Apr 2025 16:00), Max: 100 (01 Apr 2025 03:45)  HR: 112 (01 Apr 2025 16:34) (101 - 113)  BP: --  BP(mean): --  RR: 25 (01 Apr 2025 16:00) (20 - 29)  SpO2: 92% (01 Apr 2025 16:34) (91% - 100%)    Parameters below as of 01 Apr 2025 16:00  Patient On (Oxygen Delivery Method): ventilator    O2 Concentration (%): 40 I&O's Summary    31 Mar 2025 07:01  -  01 Apr 2025 07:00  --------------------------------------------------------  IN: 1065.1 mL / OUT: 2535 mL / NET: -1469.9 mL    01 Apr 2025 07:01  -  01 Apr 2025 16:36  --------------------------------------------------------  IN: 133 mL / OUT: 300 mL / NET: -167 mL       GENERAL: [ ]Cachexia   Pt intubated  [ ]Alert  [ ]Oriented x   [ ]Lethargic  [x ]Unarousable  [ ]Verbal  [ ]Non-Verbal  Behavioral:   [ ] Anxiety  [ ] Delirium [ ] Agitation [x] Other  HEENT:  [ ]Normal   [ ]Dry mouth   [x ]ET Tube/Trach  [ ]Oral lesions  PULMONARY:   [ ]Clear [ ]Tachypnea  [ ]Audible excessive secretions   [ ]Rhonchi        [ ]Right [ ]Left [ ]Bilateral  [ ]Crackles        [ ]Right [ ]Left [ ]Bilateral  [ ]Wheezing     [ ]Right [ ]Left [ ]Bilateral  [x ]Diminished breath sounds [ ]right [ ]left [ ]bilateral  CARDIOVASCULAR:    [x ]Regular [ ]Irregular [ ]Tachy  [ ]Harley [ ]Murmur [ ]Other  GASTROINTESTINAL:  [ ]Soft  [ ]Distended   [ x]+BS  [ ]Non tender [x ]Tender  [ ]Other [ ]PEG [x ]OGT/ NGT  Last BM: 3/31  GENITOURINARY:  [ ]Normal [ ] Incontinent   [ ]Oliguria/Anuria   [ x]Dave  MUSCULOSKELETAL:   [ ]Normal   [ ]Weakness  [x ]Bed/Wheelchair bound [ ]Edema  NEUROLOGIC:   [ ]No focal deficits  [ ]Cognitive impairment  [ ]Dysphagia [ ]Dysarthria [ ]Paresis [x ]Other - anoxic brain injury   SKIN: Please see flowsheets   [ ]Normal  [ ]Rash  [ x]Other  [ ]Pressure ulcer(s)       Present on admission [ ]y [ ]n    CRITICAL CARE:  [ ]Shock Present  [ ]Septic [ ]Cardiogenic [ ]Neurologic [ ]Hypovolemic  [ ]Vasopressors [ ]Inotropes  [ ]Respiratory failure present [x ]Mechanical Ventilation [ ]Non-invasive ventilatory support [ ]High-Flow Mode: AC/ CMV (Assist Control/ Continuous Mandatory Ventilation), RR (machine): 20, TV (machine): 460, FiO2: 40, PEEP: 6, ITime: 0.76, MAP: 15, PIP: 35  [ ]Acute  [ ]Chronic [ ]Hypoxic  [ ]Hypercarbic [ ]Other  [ ]Other organ failure     LABS:                        6.7    22.07 )-----------( 436      ( 01 Apr 2025 00:26 )             20.0   04-01    135  |  97[L]  |  77[H]  ----------------------------<  105[H]  4.3   |  18[L]  |  5.23[H]    Ca    9.2      01 Apr 2025 00:26  Phos  9.3     04-01  Mg     2.30     03-31    TPro  6.8  /  Alb  2.5[L]  /  TBili  11.4[H]  /  DBili  10.0[H]  /  AST  86[H]  /  ALT  29  /  AlkPhos  188[H]  04-01  PT/INR - ( 01 Apr 2025 00:26 )   PT: 12.1 sec;   INR: 1.04 ratio         PTT - ( 01 Apr 2025 00:26 )  PTT:28.7 sec    Urinalysis Basic - ( 01 Apr 2025 00:26 )    Color: x / Appearance: x / SG: x / pH: x  Gluc: 105 mg/dL / Ketone: x  / Bili: x / Urobili: x   Blood: x / Protein: x / Nitrite: x   Leuk Esterase: x / RBC: x / WBC x   Sq Epi: x / Non Sq Epi: x / Bacteria: x      RADIOLOGY & ADDITIONAL STUDIES: no new     Protein Calorie Malnutrition Present: [ ]mild [ ]moderate [ ]severe [ ]underweight [ ]morbid obesity  https://www.andeal.org/vault/2440/web/files/ONC/Table_Clinical%20Characteristics%20to%20Document%20Malnutrition-White%20JV%20et%20al%202012.pdf    Height (cm): 175 (03-19-25 @ 10:38), 170.2 (03-12-25 @ 10:17), 175.3 (12-08-24 @ 21:08)  Weight (kg): 88.8 (03-19-25 @ 10:38), 90.7 (03-12-25 @ 10:17), 87.1 (12-08-24 @ 21:08)  BMI (kg/m2): 29 (03-19-25 @ 10:38), 31.3 (03-12-25 @ 10:17), 28.3 (12-08-24 @ 21:08)    [x ]PPSV2 < or = 30%  [ ]significant weight loss [ ]poor nutritional intake [ ]anasarca[ ]Artificial Nutrition    Other REFERRALS:  [ ]Hospice  [ ]Child Life  [ ]Social Work  [ ]Case management [ ]Holistic Therapy

## 2025-04-01 NOTE — PROGRESS NOTE ADULT - SUBJECTIVE AND OBJECTIVE BOX
MICU PROGRESS NOTE     HISTORY OF PRESENT ILLNESS     Overnight, needed 1 uPRBC due to Hb 6.7. Yesterday removed L JOSH espinoza. Neuro had GOC discussion. Afebrile, HR 90s-110s. SBPs 90s-130s. MV.         ROS: All negative except as listed above.    VITAL SIGNS:  ICU Vital Signs Last 24 Hrs  T(C): 37.7 (01 Apr 2025 05:57), Max: 37.8 (01 Apr 2025 03:45)  T(F): 99.9 (01 Apr 2025 05:57), Max: 100 (01 Apr 2025 03:45)  HR: 108 (01 Apr 2025 07:00) (92 - 113)  BP: --  BP(mean): --  ABP: 125/63 (01 Apr 2025 07:00) (96/40 - 139/67)  ABP(mean): 83 (01 Apr 2025 07:00) (58 - 90)  RR: 23 (01 Apr 2025 07:00) (20 - 26)  SpO2: 96% (01 Apr 2025 07:00) (91% - 99%)    O2 Parameters below as of 01 Apr 2025 07:00  Patient On (Oxygen Delivery Method): ventilator    O2 Concentration (%): 40      Mode: AC/ CMV (Assist Control/ Continuous Mandatory Ventilation), RR (machine): 20, TV (machine): 460, FiO2: 40, PEEP: 7, ITime: 0.74, MAP: 13, PIP: 28  Plateau pressure:   P/F ratio:     03-31 @ 07:01  -  04-01 @ 07:00  --------------------------------------------------------  IN: 1065.1 mL / OUT: 2495 mL / NET: -1429.9 mL      CAPILLARY BLOOD GLUCOSE          ECG: reviewed.    PHYSICAL EXAM:    General: NAD, normal habitus, mild jaundice   Neuro: AAOx0,sedated non responsive to noxious stimuli, closed eyes, no verbal sounds, 4 mm pupils, sluggishly reactive pupils b/l   HEENT: icteric sclerae b/l , increased watery discharge from eyes   Chest: nonTTP   Heart: S1/S2, RRR   Lungs: CTA b/l, on MV   Abd: soft, nonTTP, nondistended   Ext: generalized trace pitting edema, LUE with bullae and desquamation along forearm, nodule on R 1st digit proximal phalanges   Pulses: radial 2+ b/l, dorsalis pedis 2+ b/l   Lines/tubes/drains: A line L femoral, OG tube, L IJV, flexicel   Psych: unable to assess     MEDICATIONS:  MEDICATIONS  (STANDING):  albuterol/ipratropium for Nebulization 3 milliLiter(s) Nebulizer every 6 hours  allopurinol 50 milliGRAM(s) Oral <User Schedule>  chlorhexidine 0.12% Liquid 15 milliLiter(s) Oral Mucosa every 12 hours  chlorhexidine 2% Cloths 1 Application(s) Topical daily  folic acid 1 milliGRAM(s) Oral daily  influenza   Vaccine 0.5 milliLiter(s) IntraMuscular once  levETIRAcetam  Solution 500 milliGRAM(s) Oral two times a day  meropenem  IVPB 500 milliGRAM(s) IV Intermittent every 24 hours  mupirocin 2% Nasal 1 Application(s) Both Nostrils every 12 hours  petrolatum Ophthalmic Ointment 1 Application(s) Both EYES two times a day  propofol Infusion 50 MICROgram(s)/kG/Min (26.6 mL/Hr) IV Continuous <Continuous>    MEDICATIONS  (PRN):  midazolam Injectable 2 milliGRAM(s) IV Push every 4 hours PRN tremors      ALLERGIES:  Allergies    penicillin (Pruritus)  hydroxyurea (Other)  piperacillin-tazobactam (Urticaria)  ceftriaxone (Anaphylaxis)    Intolerances        LABS:                        6.7    22.07 )-----------( 436      ( 01 Apr 2025 00:26 )             20.0     04-01    135  |  97[L]  |  77[H]  ----------------------------<  105[H]  4.3   |  18[L]  |  5.23[H]    Ca    9.2      01 Apr 2025 00:26  Phos  9.3     04-01  Mg     2.30     03-31    TPro  6.8  /  Alb  2.5[L]  /  TBili  11.4[H]  /  DBili  10.0[H]  /  AST  86[H]  /  ALT  29  /  AlkPhos  188[H]  04-01    PT/INR - ( 01 Apr 2025 00:26 )   PT: 12.1 sec;   INR: 1.04 ratio         PTT - ( 01 Apr 2025 00:26 )  PTT:28.7 sec  Urinalysis Basic - ( 01 Apr 2025 00:26 )    Color: x / Appearance: x / SG: x / pH: x  Gluc: 105 mg/dL / Ketone: x  / Bili: x / Urobili: x   Blood: x / Protein: x / Nitrite: x   Leuk Esterase: x / RBC: x / WBC x   Sq Epi: x / Non Sq Epi: x / Bacteria: x      ABG:  pH, Arterial: 7.39 (04-01-25 @ 00:26)  pCO2, Arterial: 35 mmHg (04-01-25 @ 00:26)  pO2, Arterial: 104 mmHg (04-01-25 @ 00:26)      vBG:    Micro:    Culture - Blood (collected 03-20-25 @ 10:04)  Source: Blood Blood-Peripheral  Final Report (03-25-25 @ 13:01):    No growth at 5 days        Culture - Sputum (collected 03-26-25 @ 11:45)  Source: ET Tube ET Tube  Gram Stain (03-26-25 @ 22:28):    Few Squamous epithelial cells per low power field    Moderate polymorphonuclear leukocytes per low power field    Few Yeast per oil power field  Final Report (03-28-25 @ 08:15):    Commensal lay consistent with body site    Culture - Sputum (collected 03-22-25 @ 17:15)  Source: ET Tube ET Tube  Gram Stain (03-22-25 @ 23:42):    Numerous polymorphonuclear leukocytes per low power field    Rare Squamous epithelial cells per low power field    No organisms seen per oil power field  Final Report (03-24-25 @ 06:52):    Commensal lay consistent with body site        RADIOLOGY & ADDITIONAL TESTS: Reviewed. MICU PROGRESS NOTE     HISTORY OF PRESENT ILLNESS     Overnight, needed 1 uPRBC due to Hb 6.7. Yesterday removed L JOSH paulaselene. Neuro had GOC discussion. Afebrile, HR 90s-110s. SBPs 90s-130s. MV.     Seen and examined at bedside, patient remains unable to engage meaningfully. Appears comfortable.       ROS: All negative except as listed above.    VITAL SIGNS:  ICU Vital Signs Last 24 Hrs  T(C): 37.7 (01 Apr 2025 05:57), Max: 37.8 (01 Apr 2025 03:45)  T(F): 99.9 (01 Apr 2025 05:57), Max: 100 (01 Apr 2025 03:45)  HR: 108 (01 Apr 2025 07:00) (92 - 113)  BP: --  BP(mean): --  ABP: 125/63 (01 Apr 2025 07:00) (96/40 - 139/67)  ABP(mean): 83 (01 Apr 2025 07:00) (58 - 90)  RR: 23 (01 Apr 2025 07:00) (20 - 26)  SpO2: 96% (01 Apr 2025 07:00) (91% - 99%)    O2 Parameters below as of 01 Apr 2025 07:00  Patient On (Oxygen Delivery Method): ventilator    O2 Concentration (%): 40      Mode: AC/ CMV (Assist Control/ Continuous Mandatory Ventilation), RR (machine): 20, TV (machine): 460, FiO2: 40, PEEP: 7, ITime: 0.74, MAP: 13, PIP: 28  Plateau pressure:   P/F ratio:     03-31 @ 07:01  -  04-01 @ 07:00  --------------------------------------------------------  IN: 1065.1 mL / OUT: 2495 mL / NET: -1429.9 mL      CAPILLARY BLOOD GLUCOSE          ECG: reviewed.    PHYSICAL EXAM:    General: NAD, normal habitus, mild jaundice   Neuro: AAOx0,sedated non responsive to noxious stimuli, closed eyes, no verbal sounds, 4 mm pupils, sluggishly reactive pupils b/l   HEENT: icteric sclerae b/l , increased watery discharge from eyes   Chest: nonTTP   Heart: S1/S2, RRR   Lungs: CTA b/l, on MV   Abd: soft, nonTTP, nondistended   Ext: generalized trace pitting edema, LUE wwrapped, nodule on R 1st digit proximal phalanges   Pulses: radial 2+ b/l, dorsalis pedis 2+ b/l   Lines/tubes/drains: A line L femoral, OG tube, L IJV, flexicel   Psych: unable to assess     MEDICATIONS:  MEDICATIONS  (STANDING):  albuterol/ipratropium for Nebulization 3 milliLiter(s) Nebulizer every 6 hours  allopurinol 50 milliGRAM(s) Oral <User Schedule>  chlorhexidine 0.12% Liquid 15 milliLiter(s) Oral Mucosa every 12 hours  chlorhexidine 2% Cloths 1 Application(s) Topical daily  folic acid 1 milliGRAM(s) Oral daily  influenza   Vaccine 0.5 milliLiter(s) IntraMuscular once  levETIRAcetam  Solution 500 milliGRAM(s) Oral two times a day  meropenem  IVPB 500 milliGRAM(s) IV Intermittent every 24 hours  mupirocin 2% Nasal 1 Application(s) Both Nostrils every 12 hours  petrolatum Ophthalmic Ointment 1 Application(s) Both EYES two times a day  propofol Infusion 50 MICROgram(s)/kG/Min (26.6 mL/Hr) IV Continuous <Continuous>    MEDICATIONS  (PRN):  midazolam Injectable 2 milliGRAM(s) IV Push every 4 hours PRN tremors      ALLERGIES:  Allergies    penicillin (Pruritus)  hydroxyurea (Other)  piperacillin-tazobactam (Urticaria)  ceftriaxone (Anaphylaxis)    Intolerances        LABS:                        6.7    22.07 )-----------( 436      ( 01 Apr 2025 00:26 )             20.0     04-01    135  |  97[L]  |  77[H]  ----------------------------<  105[H]  4.3   |  18[L]  |  5.23[H]    Ca    9.2      01 Apr 2025 00:26  Phos  9.3     04-01  Mg     2.30     03-31    TPro  6.8  /  Alb  2.5[L]  /  TBili  11.4[H]  /  DBili  10.0[H]  /  AST  86[H]  /  ALT  29  /  AlkPhos  188[H]  04-01    PT/INR - ( 01 Apr 2025 00:26 )   PT: 12.1 sec;   INR: 1.04 ratio         PTT - ( 01 Apr 2025 00:26 )  PTT:28.7 sec  Urinalysis Basic - ( 01 Apr 2025 00:26 )    Color: x / Appearance: x / SG: x / pH: x  Gluc: 105 mg/dL / Ketone: x  / Bili: x / Urobili: x   Blood: x / Protein: x / Nitrite: x   Leuk Esterase: x / RBC: x / WBC x   Sq Epi: x / Non Sq Epi: x / Bacteria: x      ABG:  pH, Arterial: 7.39 (04-01-25 @ 00:26)  pCO2, Arterial: 35 mmHg (04-01-25 @ 00:26)  pO2, Arterial: 104 mmHg (04-01-25 @ 00:26)      vBG:    Micro:    Culture - Blood (collected 03-20-25 @ 10:04)  Source: Blood Blood-Peripheral  Final Report (03-25-25 @ 13:01):    No growth at 5 days        Culture - Sputum (collected 03-26-25 @ 11:45)  Source: ET Tube ET Tube  Gram Stain (03-26-25 @ 22:28):    Few Squamous epithelial cells per low power field    Moderate polymorphonuclear leukocytes per low power field    Few Yeast per oil power field  Final Report (03-28-25 @ 08:15):    Commensal lay consistent with body site    Culture - Sputum (collected 03-22-25 @ 17:15)  Source: ET Tube ET Tube  Gram Stain (03-22-25 @ 23:42):    Numerous polymorphonuclear leukocytes per low power field    Rare Squamous epithelial cells per low power field    No organisms seen per oil power field  Final Report (03-24-25 @ 06:52):    Commensal lay consistent with body site        RADIOLOGY & ADDITIONAL TESTS: Reviewed.

## 2025-04-01 NOTE — PROGRESS NOTE ADULT - PROBLEM SELECTOR PLAN 1
Pt w/ oliguric MATA iso hemorrhagic shock and PEA arrest. Likely ATN. UA (3/16) w/ proteinuria and hematuria (21 RBC). Renal US (3/20) w/o hydronephrosis, and w/ large left renal subcapsular hematoma. CXR (3/22) w/ b/l hazy opacities. MRI w/ global hypoxic injury.   -NTDC placed and underwent HD on 3/24 iso worsening oliguric renal failure, w/ associated hyperkalemia and hypervolemia. Anuric despite prior diuretic challenge. Last HD 3/29, tolerated well. NTDC removed 3/31.     -Renal fx slightly worse but overall stable. Electrolytes acceptable. Pt remains non-oliguric with ~ 1.4L/24hr. SCO2 18, start bicarb tabs 650 mg BID if possible. Will monitor off of HD for now.       Eldon Hamilton  Nephrology Fellow  Feel free to contact me on TEAMS  After 5 pm and on weekends please contact the on-call Fellow.

## 2025-04-01 NOTE — PROGRESS NOTE ADULT - ASSESSMENT
Patient is 44y Male with PMHx of sickle cell disease admitted for anemia concerning for sickle cell crisis s/p ureteroscopy w/ L kidney biopsy 3/19, post-operative course c/b acute hemorrhagic shock, PEA arrest with ROSC; currently with acute renal failure on HD and global anoxic brain injury and urothelial cancer and ongoing goals of care.       NEURO:  #Global anoxic brain injury   AAOx0. On sedation with no further myoclonus Guarded prognosis.   - On propofol; wean as tolerated   - D/c fenatnyl   - S/p versed gtt, weaned off 3/23  - Repeat CT B 3/24 showing diffuse sulcal effacement with increased intracrnail pressure, and few patchy foci of cortical blurring   - 3/25 MRIB with diffuse global abnormal supratentorial cortical restricted diffusion consistent with global hypoxic injury   - Neurology made aware family interested in reviewing MRIB in detail for GOC       #Seizures  No further cyoclonus of upper body after restarting propofol.   - Witness myoclonic jerking concerning for seizures iso anoxic brain injury  - s/p 2.5g keppra load  - vEEG report: "Abundant myoclonic seizures in the setting of a severe diffuse/multifocal cerebral dysfunction which can be seen in the setting of sedative effect or due to an anoxic injury, with improvement after 20:50 suggesting a lowering of sedation"; will f/u with neuro recommendations  -C/w levetiracetam 500 mg po BID   - Trend enolase   - repeat vEEG 3/24 showing severe cerebral dysfunction   -C/w midazolam 2mg IV q8 PRN       CV:  #Shock - hemorrhagic shock s/p kidney biopsy s/p 5u pRBC, 3 plasma, 3 plt- REOSOLVED   Hemodynamically stable.   last dose of lovenox AC on 3/18 at 5PM   Hgb 8 -> ~3   - Not on levophed, MAP goal>65  - Monitor cbc q24  - Midodrine d/c'ed 03/30      #Hx of VTE (R IJ thrombus, L brachial DVT)  - CTM, hold AC given hemorrhage  - Bullae present on arm with DVT,  - Appreciate orthopedics hand surgery consult, not compartment syndrome       PULM:  #Acute hypoxic respiratory failure  In the setting of PEA arrest. Triggering ventilator on CPAP.   CXR with R lung field and left mid and lower lung field hazy opacities.  - Abx as below  - Wean vent as able  - HTS nebs, duonebs  - 3/28 CXR showing increasing RUL consolidation       RENAL:  #Acute renal failure   #Renal mass s/p biopsy  #Acidosis  Oliguric. On hemodialysis. Remains hypervolemic.    - TOV 3/28   - Trend Cr, uop, lytes  - S/p bicarb drip  - Per nephro, HD session #1 3/24, HD sessions #2 3/25, HD session #3 3/27; planning HD #4 3/29   - Planning to remove L IJV   - Per pathology result of renal biopsy, noninvasive urothelial malignancy       # urinary Incontinence  - Episode overnight. Patient was previously anuric  Plan:  - Bladder scan and straight cath PRN      GI:  #Shock liver  Downtrending LFTs. Jaundice with hyperbilirubinemia (direct).   - Trend liver enzymes  - RUQ US showing enlarged periportal and periaortic enlarged lymph nodes and enlarged left lateral liver lobe and CBD 9 mm.       #Diet:  - NPO w/ tube feed. Changing to trickle feeds now due to increased residuals     HEMATOLOGIC/ONCOLGOGIC   #Sickle cell crisis  #Acute blood loss enamia  Elevated indirect hyperbilirubinemia, likely from  peripheral hemolysis.   - heme following   -D/c deferasirox due to current renal failure     #Urothelial cancer in kidney   -Biopsy showing nonivasive urothelial malignancy on renal biopsy     #DVT prophylaxis - SCD    Endo:  #JUAN    ID:  #Pneumoniae-  likely 2/2 ventilator related   Afebrile with unchanged leukocytosis. Bullae and desquamation without active signs of infection in LUE.   - D/c  aztreonam 2000 mg IV qd ( started 3/22- 3/27)   -C/w  meropenem 500 mg IV qd (started 3/27-3/31)   - CXR showing RUL consolidation    MSK:  #infiltration  Infiltration of sodium bicarb into left arm, now with arm distension and bullae. Elevated uric acid with nodule suggegstive of podagra however no signs of active gout flare.   -Appreciate orthopedic hand surgery, not compartment syndrome   - CTM    Ethics: Full Code   -Had family meeting with neurology, MICU team; ongoing goals of care conversation   -Appreciate palliative recommendations, will continue to have conversations  -Family interested in reviewing MRIB with neurology to understand     Patient is 44y Male with PMHx of sickle cell disease admitted for anemia concerning for sickle cell crisis s/p ureteroscopy w/ L kidney biopsy 3/19, post-operative course c/b acute hemorrhagic shock, PEA arrest with ROSC; currently with acute renal failure on HD and global anoxic brain injury and urothelial cancer and ongoing goals of care.       NEURO:  #Global anoxic brain injury   AAOx0. On sedation with no further myoclonus Guarded prognosis.   - On propofol; wean as tolerated   - D/c fenatnyl   - S/p versed gtt, weaned off 3/23  - Repeat CT B 3/24 showing diffuse sulcal effacement with increased intracrnail pressure, and few patchy foci of cortical blurring   - 3/25 MRIB with diffuse global abnormal supratentorial cortical restricted diffusion consistent with global hypoxic injury   - Neurology made aware family interested in reviewing MRIB in detail for GOC       #Seizures  No further cyoclonus of upper body after restarting propofol.   - Witness myoclonic jerking concerning for seizures iso anoxic brain injury  - s/p 2.5g keppra load  - vEEG report: "Abundant myoclonic seizures in the setting of a severe diffuse/multifocal cerebral dysfunction which can be seen in the setting of sedative effect or due to an anoxic injury, with improvement after 20:50 suggesting a lowering of sedation"; will f/u with neuro recommendations  -C/w levetiracetam 500 mg po BID   - Trend enolase   - repeat vEEG 3/24 showing severe cerebral dysfunction   -C/w midazolam 2mg IV q8 PRN       CV:  #Shock - hemorrhagic shock s/p kidney biopsy s/p 5u pRBC, 3 plasma, 3 plt- REOSOLVED   Hemodynamically stable.   last dose of lovenox AC on 3/18 at 5PM   Hgb 8 -> ~3   - Not on levophed, MAP goal>65  - Monitor cbc q24  - Midodrine d/c'ed 03/30      #Hx of VTE (R IJ thrombus, L brachial DVT)  - CTM, hold AC given hemorrhage  - Bullae present on arm with DVT,  - Appreciate orthopedics hand surgery consult, not compartment syndrome       PULM:  #Acute hypoxic respiratory failure  In the setting of PEA arrest. Triggering ventilator on CPAP.   CXR with R lung field and left mid and lower lung field hazy opacities.  - Abx as below  - Wean vent as able  - HTS nebs, duonebs  - 3/28 CXR showing increasing RUL consolidation       RENAL:  #Acute renal failure   #Renal mass s/p biopsy  #Acidosis- RESOLVED   Oliguric. On hemodialysis. Remains hypervolemic.    - TOV 3/28, failed; replaced daniel on 4/1  -Starting doxazoin 1 mg po qhs to optimize subsequent TOV trial   - Trend Cr, uop, lytes  - S/p bicarb drip  - Per nephro, HD session #1 3/24, HD sessions #2 3/25, HD session #3 3/27; planning HD #4 3/29; currently will monitor off HD   - 3/31 removed L IJV shiley   - Per pathology result of renal biopsy, noninvasive urothelial malignancy             GI:  #Shock liver  Downtrending LFTs. Jaundice with hyperbilirubinemia (direct).   - Trend liver enzymes  - RUQ US showing enlarged periportal and periaortic enlarged lymph nodes and enlarged left lateral liver lobe and CBD 9 mm.       #Diet:  - NPO w/ tube feed. Changing to trickle feeds now due to increased residuals     HEMATOLOGIC/ONCOLGOGIC   #Sickle cell crisis  #Acute blood loss anemia  Downtrending direct hyperbilirubinemia,  gradually. Recent Hb drop.   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 Bullae and desquamation without active signs of infection in LUE.   - D/c  aztreonam 2000 mg IV qd ( started 3/22- 3/27)   - Completed  meropenem 500 mg IV qd (started 3/27-3/31)   - CXR showing RUL consolidation    MSK:  #infiltration  Infiltration of sodium bicarb into left arm, now with arm distension and bullae. Elevated uric acid with nodule suggegstive of podagra however no signs of active gout flare.   -Appreciate orthopedic hand surgery, not compartment syndrome   - CTM    Ethics: Full Code   -Had family meeting with neurology, MICU team; ongoing goals of care conversation   -Appreciate palliative recommendations, will continue to have conversations  -Family interested in having family meeting with palliative and neurology to discuss plans going forwards

## 2025-04-01 NOTE — PROGRESS NOTE ADULT - TIME BILLING
Time spent for extensive review of the physical chart, electronic medical record, and documentation to obtain collateral information including but not limited to:  [x ] Inpatient records (ED, H&P, primary team, and consultants if applicable, care coordination)  [x ] Inpatient values/results (biomarkers, immunoassays, imaging, and microbiology results)  [x ] Current or proposed treatment plans  [x  ] Discussion with the primary team  [x ] Discussion with the patient, surrogate decision maker, or family  [x] 46 mins spent discussing goc     Time spent: >81 min

## 2025-04-01 NOTE — PROGRESS NOTE ADULT - PROBLEM SELECTOR PLAN 1
Pt s/p PEA arrest on 3/20. Pt currently intubated.   > MR brain concerning for hypoxic ischemic injury prognosis is guarded at this time and current scan is suggestive of poor neurological recovery and prolonged vegetative state.   > Appreciate Neurology recs- trend enolase, s/p VEEG showed severe cerebral dysfunction  > Per discussion with MICU team, family would like to review imaging with Neurologist.    > Patient on keppra 500mg tid   > Patient has been off sedation since 3/26 with no improvement in mental status   > Weaned off pressors   > Appreciate MICU care.  > Fevers- tylenol, cooling blankets

## 2025-04-01 NOTE — PROGRESS NOTE ADULT - ATTENDING COMMENTS
Patient is a 45 yo M w/ SSD, UE DVT, gout, HTN, with L renal mass admitted with acute anemia/ sickle cell crisis and evaluation of his left renal mass s/p left ureteroscopy and biopsy (3/19/25) with 24 hour post operative course complicated by rapid responses for hypoglycemia c/b cardiac arrest x 4 minutes CPR/1 epi with ROSC, transferred to MICU for further care, found to have anoxic brain injury    MRI showing anoxia. 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     # Cardiac arrest  # Anoxic encephalopathy  # SZ vs myoclonus  # Shock, resolved  # Anemia  # SSD  # Transaminitis  # MATA 2/2 to ATN  # DVT  # Hyperbilirubinemia, Early cirrhosis?  # Aspiration pneumonia  # Urothelial Cancer   - c/w Keppra   - c/w Propofol for now for severe myoclonus  - f/u neuro for prognostication, will f/u family re GOC meeting with neuro/palli  - c/w mechanical ventilatory support  - c/w duonebs for airway clearance  - Monitor BMP, good UOP but creatinine uptrending. Monitor off HD for now, no urgent indication  - Transfuse PRN, now s/p embolization by IR  - Monitor hemolysis labs  - Early cirrhosis on US. Trend bili  - c/w empiric Jon, last day today  - Hand lesions. Initially consulted vascular, vascular recommending plastics/hand--> no surgical interventions.   - Biopsy showing Non-invasive papillary urothelial carcinoma. Further management based on going GOC.   - Failed TOV, add flomax  - Will f/u family re GOC meeting with neuro/palli    Zaid Bartholomew MD  Pulmonary & Critical Care

## 2025-04-01 NOTE — PROGRESS NOTE ADULT - CONVERSATION DETAILS
Met with patient's brother, Bala, and sister, Radha, at bedside this afternoon. Bala shared that he did have discussion with neurology team yesterday, however he is struggling with making decisions as he values the input of patient's Godmothers Jazmin Christopher and Saritha Christopher, who are "very Sabianist and do not want  him to make decisions that would not give patient the opportunity for a miracle to happen". He shared that he and his sister have discussed in the past that Alex would not want to "live like a vegetable" but again he cannot make the decision to establish a DNR or not Trach his brother without having his entire family on board as he does not want to feel responsible for his brothers passing. He feels that having a family meeting with a  with patient's 2 Godmothers, himself, sister Radha and niece (who is an RN) would help all of the family members be on the same page with the patient's prognosis and treatment decisions for the future.     Additionally, Bala also shares that he has personal experience with a friend who has been on ventilator for 2 years with no improvement in mental status and his friend's family is struggling with caregiver support burden and no improvement. Family shares that they don't feel that Alex would want to live on long term vent support but at the same time they share that outside friends have told them that they can transition to "comfort care and remove the tracheostomy at any point". I encouraged them to focus on what Alex would want now to prevent long term suffering and also shared that pursuing tracheostomy would also mean patient would need peg tube, vent facility placement and he becomes at risk for developing wounds and infections with prolonged immobility. Bala shared again that it is very difficult for him to make a decision without the blessing from close family members. He is trying to coordinate a time for everyone to be available and will notify the medical team when a time is set up.

## 2025-04-01 NOTE — PROGRESS NOTE ADULT - SUBJECTIVE AND OBJECTIVE BOX
[facility] DIVISION OF KIDNEY DISEASES AND HYPERTENSION   FOLLOW UP NOTE    --------------------------------------------------------------------------------  Chief Complaint:    24 hour events/subjective: Pt. was seen and examined today.   Still intubated, in MICU, non-oliguric now    PAST HISTORY  --------------------------------------------------------------------------------  No significant changes to PMH, PSH, FHx, SHx, unless otherwise noted    ALLERGIES & MEDICATIONS  --------------------------------------------------------------------------------  Allergies    penicillin (Pruritus)  hydroxyurea (Other)  piperacillin-tazobactam (Urticaria)  ceftriaxone (Anaphylaxis)    Intolerances      Standing Inpatient Medications  albuterol/ipratropium for Nebulization 3 milliLiter(s) Nebulizer every 6 hours  allopurinol 50 milliGRAM(s) Oral <User Schedule>  chlorhexidine 0.12% Liquid 15 milliLiter(s) Oral Mucosa every 12 hours  chlorhexidine 2% Cloths 1 Application(s) Topical daily  folic acid 1 milliGRAM(s) Oral daily  influenza   Vaccine 0.5 milliLiter(s) IntraMuscular once  levETIRAcetam  Solution 500 milliGRAM(s) Oral two times a day  meropenem  IVPB 500 milliGRAM(s) IV Intermittent every 24 hours  mupirocin 2% Nasal 1 Application(s) Both Nostrils every 12 hours  petrolatum Ophthalmic Ointment 1 Application(s) Both EYES two times a day  propofol Infusion 50 MICROgram(s)/kG/Min IV Continuous <Continuous>    PRN Inpatient Medications  midazolam Injectable 2 milliGRAM(s) IV Push every 4 hours PRN      REVIEW OF SYSTEMS  --------------------------------------------------------------------------------  unable to obtain due to clinic status      VITALS/PHYSICAL EXAM  --------------------------------------------------------------------------------  T(C): 37.7 (04-01-25 @ 08:00), Max: 37.8 (04-01-25 @ 03:45)  HR: 108 (04-01-25 @ 09:00) (93 - 113)  BP: --  RR: 20 (04-01-25 @ 09:00) (20 - 26)  SpO2: 97% (04-01-25 @ 09:00) (91% - 98%)  Wt(kg): --        03-31-25 @ 07:01  -  04-01-25 @ 07:00  --------------------------------------------------------  IN: 1065.1 mL / OUT: 2535 mL / NET: -1469.9 mL    04-01-25 @ 07:01  -  04-01-25 @ 09:26  --------------------------------------------------------  IN: 53.2 mL / OUT: 80 mL / NET: -26.8 mL        Physical Exam:  	Gen: Critically ill, intubated/sedated  	HEENT: +ETT  	Pulm: CTA B/L  	CV: S1S2+  	Abd: Soft, +BS   	Ext: +LE edema B/L, +UE edema b/l   	Neuro: Sedated  	Skin: Warm and dry  	Dialysis access: None      LABS/STUDIES  --------------------------------------------------------------------------------              6.7    22.07 >-----------<  436      [04-01-25 @ 00:26]              20.0     135  |  97  |  77  ----------------------------<  105      [04-01-25 @ 00:26]  4.3   |  18  |  5.23        Ca     9.2     [04-01-25 @ 00:26]      iCa    1.14     [04-01 @ 00:26]      Mg     2.30     [03-31-25 @ 01:30]      Phos  9.3     [04-01-25 @ 00:26]    TPro  6.8  /  Alb  2.5  /  TBili  11.4  /  DBili  10.0  /  AST  86  /  ALT  29  /  AlkPhos  188  [04-01-25 @ 00:26]    PT/INR: PT 12.1 , INR 1.04       [04-01-25 @ 00:26]  PTT: 28.7       [04-01-25 @ 00:26]    Uric acid 7.3      [03-31-25 @ 01:30]        [04-01-25 @ 00:26]    Creatinine Trend:  SCr 5.23 [04-01 @ 00:26]  SCr 4.96 [03-31 @ 18:50]  SCr 4.80 [03-31 @ 01:30]  SCr 4.10 [03-30 @ 00:40]  SCr 5.39 [03-29 @ 00:15]    Urinalysis - [04-01-25 @ 00:26]      Color  / Appearance  / SG  / pH       Gluc 105 / Ketone   / Bili  / Urobili        Blood  / Protein  / Leuk Est  / Nitrite       RBC  / WBC  / Hyaline  / Gran  / Sq Epi  / Non Sq Epi  / Bacteria       HBsAb 50.9      [03-29-25 @ 07:15]  HBsAg Nonreact      [03-17-25 @ 15:26]  HBcAb Nonreact      [03-29-25 @ 07:15]  HCV 0.10, Nonreact      [03-17-25 @ 15:26]  HIV Nonreact      [03-17-25 @ 15:26]      Tacrolimus  Cyclosporine  Sirolimus  Mycophenolate  BK PCR  CMV PCRCMVPCR Log: NotDetec [id number]/mL (12-27 @ 05:38)    Parvo PCR  EBV PCR

## 2025-04-02 LAB
ALBUMIN SERPL ELPH-MCNC: 2.5 G/DL — LOW (ref 3.3–5)
ALP SERPL-CCNC: 198 U/L — HIGH (ref 40–120)
ALT FLD-CCNC: 24 U/L — SIGNIFICANT CHANGE UP (ref 4–41)
ANION GAP SERPL CALC-SCNC: 23 MMOL/L — HIGH (ref 7–14)
APTT BLD: 29 SEC — SIGNIFICANT CHANGE UP (ref 24.5–35.6)
AST SERPL-CCNC: 97 U/L — HIGH (ref 4–40)
BASOPHILS # BLD AUTO: 0.12 K/UL — SIGNIFICANT CHANGE UP (ref 0–0.2)
BASOPHILS NFR BLD AUTO: 0.5 % — SIGNIFICANT CHANGE UP (ref 0–2)
BILIRUB DIRECT SERPL-MCNC: 11.5 MG/DL — HIGH (ref 0–0.3)
BILIRUB INDIRECT FLD-MCNC: 1 MG/DL — SIGNIFICANT CHANGE UP (ref 0–1)
BILIRUB SERPL-MCNC: 12.5 MG/DL — HIGH (ref 0.2–1.2)
BILIRUB SERPL-MCNC: 12.5 MG/DL — HIGH (ref 0.2–1.2)
BLOOD GAS ARTERIAL - LYTES,HGB,ICA,LACT RESULT: SIGNIFICANT CHANGE UP
BUN SERPL-MCNC: 93 MG/DL — HIGH (ref 7–23)
CA-I BLD-SCNC: 1.11 MMOL/L — LOW (ref 1.15–1.29)
CALCIUM SERPL-MCNC: 9.2 MG/DL — SIGNIFICANT CHANGE UP (ref 8.4–10.5)
CHLORIDE SERPL-SCNC: 96 MMOL/L — LOW (ref 98–107)
CK SERPL-CCNC: 13 U/L — LOW (ref 30–200)
CO2 SERPL-SCNC: 15 MMOL/L — LOW (ref 22–31)
CREAT SERPL-MCNC: 5.38 MG/DL — HIGH (ref 0.5–1.3)
EGFR: 13 ML/MIN/1.73M2 — LOW
EGFR: 13 ML/MIN/1.73M2 — LOW
EOSINOPHIL # BLD AUTO: 0.3 K/UL — SIGNIFICANT CHANGE UP (ref 0–0.5)
EOSINOPHIL NFR BLD AUTO: 1.4 % — SIGNIFICANT CHANGE UP (ref 0–6)
GLUCOSE SERPL-MCNC: 110 MG/DL — HIGH (ref 70–99)
HAPTOGLOB SERPL-MCNC: <20 MG/DL — LOW (ref 34–200)
HCT VFR BLD CALC: 18.9 % — CRITICAL LOW (ref 39–50)
HGB BLD-MCNC: 7 G/DL — CRITICAL LOW (ref 13–17)
IANC: 17.43 K/UL — HIGH (ref 1.8–7.4)
IMM GRANULOCYTES NFR BLD AUTO: 1.9 % — HIGH (ref 0–0.9)
INR BLD: 1.01 RATIO — SIGNIFICANT CHANGE UP (ref 0.85–1.16)
LDH SERPL L TO P-CCNC: 585 U/L — HIGH (ref 135–225)
LYMPHOCYTES # BLD AUTO: 1.65 K/UL — SIGNIFICANT CHANGE UP (ref 1–3.3)
LYMPHOCYTES # BLD AUTO: 7.5 % — LOW (ref 13–44)
MAGNESIUM SERPL-MCNC: 2.3 MG/DL — SIGNIFICANT CHANGE UP (ref 1.6–2.6)
MCHC RBC-ENTMCNC: 29.4 PG — SIGNIFICANT CHANGE UP (ref 27–34)
MCHC RBC-ENTMCNC: 37 G/DL — HIGH (ref 32–36)
MCV RBC AUTO: 79.4 FL — LOW (ref 80–100)
MONOCYTES # BLD AUTO: 2.16 K/UL — HIGH (ref 0–0.9)
MONOCYTES NFR BLD AUTO: 9.8 % — SIGNIFICANT CHANGE UP (ref 2–14)
NEUTROPHILS # BLD AUTO: 17.43 K/UL — HIGH (ref 1.8–7.4)
NEUTROPHILS NFR BLD AUTO: 78.9 % — HIGH (ref 43–77)
NRBC # BLD AUTO: 0.07 K/UL — HIGH (ref 0–0)
NRBC # FLD: 0.07 K/UL — HIGH (ref 0–0)
NRBC BLD AUTO-RTO: 0 /100 WBCS — SIGNIFICANT CHANGE UP (ref 0–0)
PHOSPHATE SERPL-MCNC: 11.6 MG/DL — HIGH (ref 2.5–4.5)
PLATELET # BLD AUTO: 425 K/UL — HIGH (ref 150–400)
POTASSIUM SERPL-MCNC: 4.6 MMOL/L — SIGNIFICANT CHANGE UP (ref 3.5–5.3)
POTASSIUM SERPL-SCNC: 4.6 MMOL/L — SIGNIFICANT CHANGE UP (ref 3.5–5.3)
PROT SERPL-MCNC: 6.8 G/DL — SIGNIFICANT CHANGE UP (ref 6–8.3)
PROTHROM AB SERPL-ACNC: 12 SEC — SIGNIFICANT CHANGE UP (ref 9.9–13.4)
RBC # BLD: 2.38 M/UL — LOW (ref 4.2–5.8)
RBC # BLD: 2.38 M/UL — LOW (ref 4.2–5.8)
RBC # FLD: 20.9 % — HIGH (ref 10.3–14.5)
RETICS #: 132.2 K/UL — HIGH (ref 25–125)
RETICS/RBC NFR: 5.5 % — HIGH (ref 0.5–2.5)
SODIUM SERPL-SCNC: 134 MMOL/L — LOW (ref 135–145)
URATE SERPL-MCNC: 10.9 MG/DL — HIGH (ref 3.4–8.8)
WBC # BLD: 22.07 K/UL — HIGH (ref 3.8–10.5)
WBC # FLD AUTO: 22.07 K/UL — HIGH (ref 3.8–10.5)

## 2025-04-02 PROCEDURE — 36800 INSERTION OF CANNULA: CPT | Mod: GC

## 2025-04-02 PROCEDURE — 99291 CRITICAL CARE FIRST HOUR: CPT | Mod: GC,25

## 2025-04-02 PROCEDURE — 31645 BRNCHSC W/THER ASPIR 1ST: CPT | Mod: GC

## 2025-04-02 PROCEDURE — 99233 SBSQ HOSP IP/OBS HIGH 50: CPT | Mod: GC

## 2025-04-02 PROCEDURE — 31624 DX BRONCHOSCOPE/LAVAGE: CPT | Mod: GC

## 2025-04-02 PROCEDURE — 76536 US EXAM OF HEAD AND NECK: CPT | Mod: 26,59,GC

## 2025-04-02 PROCEDURE — 71045 X-RAY EXAM CHEST 1 VIEW: CPT | Mod: 26

## 2025-04-02 RX ORDER — VANCOMYCIN HCL IN 5 % DEXTROSE 1.5G/250ML
1500 PLASTIC BAG, INJECTION (ML) INTRAVENOUS ONCE
Refills: 0 | Status: COMPLETED | OUTPATIENT
Start: 2025-04-02 | End: 2025-04-02

## 2025-04-02 RX ORDER — MEROPENEM 1 G/30ML
500 INJECTION INTRAVENOUS EVERY 24 HOURS
Refills: 0 | Status: COMPLETED | OUTPATIENT
Start: 2025-04-02 | End: 2025-04-07

## 2025-04-02 RX ORDER — SODIUM BICARBONATE 1 MEQ/ML
650 SYRINGE (ML) INTRAVENOUS THREE TIMES A DAY
Refills: 0 | Status: DISCONTINUED | OUTPATIENT
Start: 2025-04-02 | End: 2025-04-12

## 2025-04-02 RX ORDER — MIDAZOLAM IN 0.9 % SOD.CHLORID 1 MG/ML
4 PLASTIC BAG, INJECTION (ML) INTRAVENOUS ONCE
Refills: 0 | Status: DISCONTINUED | OUTPATIENT
Start: 2025-04-02 | End: 2025-04-02

## 2025-04-02 RX ORDER — FENTANYL CITRATE-0.9 % NACL/PF 100MCG/2ML
100 SYRINGE (ML) INTRAVENOUS ONCE
Refills: 0 | Status: DISCONTINUED | OUTPATIENT
Start: 2025-04-02 | End: 2025-04-02

## 2025-04-02 RX ADMIN — Medication 100 MICROGRAM(S): at 13:00

## 2025-04-02 RX ADMIN — IPRATROPIUM BROMIDE AND ALBUTEROL SULFATE 3 MILLILITER(S): .5; 2.5 SOLUTION RESPIRATORY (INHALATION) at 03:15

## 2025-04-02 RX ADMIN — Medication 15 MILLILITER(S): at 17:26

## 2025-04-02 RX ADMIN — DOXAZOSIN MESYLATE 1 MILLIGRAM(S): 8 TABLET ORAL at 21:07

## 2025-04-02 RX ADMIN — Medication 100 MICROGRAM(S): at 12:50

## 2025-04-02 RX ADMIN — LEVETIRACETAM 500 MILLIGRAM(S): 10 INJECTION, SOLUTION INTRAVENOUS at 17:26

## 2025-04-02 RX ADMIN — Medication 15 MILLILITER(S): at 05:45

## 2025-04-02 RX ADMIN — MUPIROCIN CALCIUM 1 APPLICATION(S): 20 CREAM TOPICAL at 17:26

## 2025-04-02 RX ADMIN — Medication 1 APPLICATION(S): at 11:51

## 2025-04-02 RX ADMIN — PROPOFOL 26.6 MICROGRAM(S)/KG/MIN: 10 INJECTION, EMULSION INTRAVENOUS at 07:00

## 2025-04-02 RX ADMIN — IPRATROPIUM BROMIDE AND ALBUTEROL SULFATE 3 MILLILITER(S): .5; 2.5 SOLUTION RESPIRATORY (INHALATION) at 08:30

## 2025-04-02 RX ADMIN — MUPIROCIN CALCIUM 1 APPLICATION(S): 20 CREAM TOPICAL at 05:44

## 2025-04-02 RX ADMIN — Medication 1 APPLICATION(S): at 05:44

## 2025-04-02 RX ADMIN — FOLIC ACID 1 MILLIGRAM(S): 1 TABLET ORAL at 11:51

## 2025-04-02 RX ADMIN — Medication 650 MILLIGRAM(S): at 14:06

## 2025-04-02 RX ADMIN — Medication 650 MILLIGRAM(S): at 21:07

## 2025-04-02 RX ADMIN — PROPOFOL 26.6 MICROGRAM(S)/KG/MIN: 10 INJECTION, EMULSION INTRAVENOUS at 14:03

## 2025-04-02 RX ADMIN — IPRATROPIUM BROMIDE AND ALBUTEROL SULFATE 3 MILLILITER(S): .5; 2.5 SOLUTION RESPIRATORY (INHALATION) at 15:35

## 2025-04-02 RX ADMIN — Medication 1 APPLICATION(S): at 17:26

## 2025-04-02 RX ADMIN — IPRATROPIUM BROMIDE AND ALBUTEROL SULFATE 3 MILLILITER(S): .5; 2.5 SOLUTION RESPIRATORY (INHALATION) at 19:45

## 2025-04-02 RX ADMIN — LEVETIRACETAM 500 MILLIGRAM(S): 10 INJECTION, SOLUTION INTRAVENOUS at 05:46

## 2025-04-02 RX ADMIN — MEROPENEM 100 MILLIGRAM(S): 1 INJECTION INTRAVENOUS at 15:54

## 2025-04-02 RX ADMIN — PROPOFOL 26.6 MICROGRAM(S)/KG/MIN: 10 INJECTION, EMULSION INTRAVENOUS at 21:07

## 2025-04-02 RX ADMIN — PROPOFOL 26.6 MICROGRAM(S)/KG/MIN: 10 INJECTION, EMULSION INTRAVENOUS at 07:35

## 2025-04-02 RX ADMIN — Medication 4 MILLIGRAM(S): at 12:50

## 2025-04-02 RX ADMIN — Medication 300 MILLIGRAM(S): at 16:22

## 2025-04-02 NOTE — PROGRESS NOTE ADULT - ATTENDING COMMENTS
Patient is a 45 yo M w/ SSD, UE DVT, gout, HTN, with L renal mass admitted with acute anemia/ sickle cell crisis and evaluation of his left renal mass s/p left ureteroscopy and biopsy (3/19/25) with 24 hour post operative course complicated by rapid responses for hypoglycemia c/b cardiac arrest x 4 minutes CPR/1 epi with ROSC, transferred to MICU for further care, found to have anoxic brain injury    MRI showing anoxia. 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     Desaturation this AM, requiring bagging with suctioning of copious secretions from ETT    # Cardiac arrest  # Anoxic encephalopathy  # SZ vs myoclonus  # Shock, resolved  # Anemia  # SSD  # Transaminitis  # MATA 2/2 to ATN  # DVT  # Hyperbilirubinemia, Early cirrhosis?  # Worsening R sided PNA  # Urothelial Cancer   - c/w Keppra   - c/w Propofol for now for severe myoclonus  - f/u neuro for prognostication, will f/u family re [facility] meeting with neuro/palli  - c/w mechanical ventilatory support, emergently bronched today. See bronch note.  - Will restart empiric abx Vanc/Jon, f/u BAL cultures  - c/w duonebs for airway clearance. Add 3%  - Monitor BMP, good UOP but creatinine uptrending. Monitor off HD for now, no urgent indication  - Transfuse PRN, now s/p embolization by IR  - Monitor hemolysis labs  - Early cirrhosis on US. Trend bili  - Hand lesions. Initially consulted vascular, vascular recommending plastics/hand--> no surgical interventions.   - Biopsy showing Non-invasive papillary urothelial carcinoma. Further management based on going [facility].   - c/w Doxazosin  - Will f/u family re [facility]    Zaid Bartholomew MD  Pulmonary & Critical Care

## 2025-04-02 NOTE — CHART NOTE - NSCHARTNOTEFT_GEN_A_CORE
: Faraz Parikh    INDICATION: tracheostomy evaluation    PROCEDURE:  [ ] LIMITED ECHO  [ ] LIMITED CHEST  [ ] LIMITED RETROPERITONEAL  [ ] LIMITED ABDOMINAL  [ ] LIMITED DVT  [ ] NEEDLE GUIDANCE VASCULAR  [ ] NEEDLE GUIDANCE THORACENTESIS  [ ] NEEDLE GUIDANCE PARACENTESIS  [ ] NEEDLE GUIDANCE PERICARDIOCENTESIS  [x] OTHER: NECK    FINDINGS:  1st tracheal ring with overlying small vessel which is easily compressible and non-pulsatile and without overlying innominate vessel.    INTERPRETATION:  1st tracheal ring with overlying small vessel which is easily compressible and non-pulsatile and without overlying innominate vessel.      Images uploaded on [employer] : Faraz Parikh    INDICATION: tracheostomy evaluation    PROCEDURE:  [ ] LIMITED ECHO  [ ] LIMITED CHEST  [ ] LIMITED RETROPERITONEAL  [ ] LIMITED ABDOMINAL  [ ] LIMITED DVT  [ ] NEEDLE GUIDANCE VASCULAR  [ ] NEEDLE GUIDANCE THORACENTESIS  [ ] NEEDLE GUIDANCE PARACENTESIS  [ ] NEEDLE GUIDANCE PERICARDIOCENTESIS  [x] OTHER: NECK    FINDINGS:  1st tracheal ring with overlying small vessel which is easily compressible and non-pulsatile and without overlying innominate vessel.    INTERPRETATION:  1st tracheal ring with overlying small vessel which is easily compressible and non-pulsatile and without overlying innominate vessel.      Images uploaded on [employer]    Agree w above    A Jon SOLITARIO

## 2025-04-02 NOTE — PROGRESS NOTE ADULT - ASSESSMENT
Patient is 44y Male with PMHx of sickle cell disease admitted for anemia concerning for sickle cell crisis s/p ureteroscopy w/ L kidney biopsy 3/19, post-operative course c/b acute hemorrhagic shock, PEA arrest with ROSC; currently with acute renal failure on HD and global anoxic brain injury and urothelial cancer and ongoing goals of care.       NEURO:  #Global anoxic brain injury   AAOx0. On sedation with no further myoclonus Guarded prognosis.   - On propofol; wean as tolerated   - D/c fenatnyl   - S/p versed gtt, weaned off 3/23  - Repeat CT B 3/24 showing diffuse sulcal effacement with increased intracrnail pressure, and few patchy foci of cortical blurring   - 3/25 MRIB with diffuse global abnormal supratentorial cortical restricted diffusion consistent with global hypoxic injury   - Neurology made aware family interested in reviewing MRIB in detail for GOC       #Seizures  No further cyoclonus of upper body after restarting propofol.   - Witness myoclonic jerking concerning for seizures iso anoxic brain injury  - s/p 2.5g keppra load  - vEEG report: "Abundant myoclonic seizures in the setting of a severe diffuse/multifocal cerebral dysfunction which can be seen in the setting of sedative effect or due to an anoxic injury, with improvement after 20:50 suggesting a lowering of sedation"; will f/u with neuro recommendations  -C/w levetiracetam 500 mg po BID   - Trend enolase   - repeat vEEG 3/24 showing severe cerebral dysfunction   -C/w midazolam 2mg IV q8 PRN       CV:  #Shock - hemorrhagic shock s/p kidney biopsy s/p 5u pRBC, 3 plasma, 3 plt- REOSOLVED   Hemodynamically stable.   last dose of lovenox AC on 3/18 at 5PM   Hgb 8 -> ~3   - Not on levophed, MAP goal>65  - Monitor cbc q24  - Midodrine d/c'ed 03/30      #Hx of VTE (R IJ thrombus, L brachial DVT)  - CTM, hold AC given hemorrhage  - Bullae present on arm with DVT,  - Appreciate orthopedics hand surgery consult, not compartment syndrome       PULM:  #Acute hypoxic respiratory failure  In the setting of PEA arrest. Triggering ventilator on CPAP.   CXR with R lung field and left mid and lower lung field hazy opacities.  - Abx as below  - Wean vent as able  - HTS nebs, duonebs  - 3/28 CXR showing increasing RUL consolidation       RENAL:  #Acute renal failure   #Renal mass s/p biopsy  #Acidosis- RESOLVED   Oliguric. On hemodialysis. Remains hypervolemic.    - TOV 3/28, failed; replaced daniel on 4/1  -Starting doxazoin 1 mg po qhs to optimize subsequent TOV trial   - Trend Cr, uop, lytes  - S/p bicarb drip  - Per nephro, HD session #1 3/24, HD sessions #2 3/25, HD session #3 3/27; planning HD #4 3/29; currently will monitor off HD   - 3/31 removed L IJV shiley   - Per pathology result of renal biopsy, noninvasive urothelial malignancy             GI:  #Shock liver  Downtrending LFTs. Jaundice with hyperbilirubinemia (direct).   - Trend liver enzymes  - RUQ US showing enlarged periportal and periaortic enlarged lymph nodes and enlarged left lateral liver lobe and CBD 9 mm.       #Diet:  - NPO w/ tube feed. Changing to trickle feeds now due to increased residuals     HEMATOLOGIC/ONCOLGOGIC   #Sickle cell crisis  #Acute blood loss anemia  Downtrending direct hyperbilirubinemia,  gradually. Recent Hb drop.   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 Bullae and desquamation without active signs of infection in LUE.   - D/c  aztreonam 2000 mg IV qd ( started 3/22- 3/27)   - Completed  meropenem 500 mg IV qd (started 3/27-3/31)   - CXR showing RUL consolidation    MSK:  #infiltration  Infiltration of sodium bicarb into left arm, now with arm distension and bullae. Elevated uric acid with nodule suggegstive of podagra however no signs of active gout flare.   -Appreciate orthopedic hand surgery, not compartment syndrome   - CTM    Ethics: Full Code   -Had family meeting with neurology, MICU team; ongoing goals of care conversation   -Appreciate palliative recommendations, will continue to have conversations  -Family interested in having family meeting with palliative and neurology to discuss plans going forwards    Patient is 44y Male with PMHx of sickle cell disease admitted for anemia concerning for sickle cell crisis s/p ureteroscopy w/ L kidney biopsy 3/19, post-operative course c/b acute hemorrhagic shock, PEA arrest with ROSC; currently with acute renal failure on HD and global anoxic brain injury and urothelial cancer and ongoing goals of care.       NEURO:  #Global anoxic brain injury   AAOx0. On sedation with no further myoclonus Guarded prognosis.   - On propofol; wean as tolerated   - D/c fenatnyl   - S/p versed gtt, weaned off 3/23  - Repeat CT B 3/24 showing diffuse sulcal effacement with increased intracrnail pressure, and few patchy foci of cortical blurring   - 3/25 MRIB with diffuse global abnormal supratentorial cortical restricted diffusion consistent with global hypoxic injury       #Seizures  No further myoclonus of upper body after restarting propofol.   - Witness myoclonic jerking concerning for seizures iso anoxic brain injury  - s/p 2.5g keppra load  - vEEG report: "Abundant myoclonic seizures in the setting of a severe diffuse/multifocal cerebral dysfunction which can be seen in the setting of sedative effect or due to an anoxic injury, with improvement after 20:50 suggesting a lowering of sedation"; will f/u with neuro recommendations  -C/w levetiracetam 500 mg po BID   - Trend enolase   - repeat vEEG 3/24 showing severe cerebral dysfunction   -C/w midazolam 2mg IV q8 PRN       CV:  #Shock - hemorrhagic shock s/p kidney biopsy s/p 5u pRBC, 3 plasma, 3 plt- RESOLVED   Hemodynamically stable.   last dose of lovenox AC on 3/18 at 5PM   Hgb 8 -> ~3   - Not on levophed, MAP goal>65  - Monitor cbc q24      #Hx of VTE (R IJ thrombus, L brachial DVT)  - CTM, hold AC given hemorrhage  - Bullae present on arm with DVT,  - Appreciate orthopedics hand surgery consult, not compartment syndrome       PULM:  #Acute hypoxic respiratory failure  In the setting of PEA arrest. Triggering ventilator on CPAP. Still requiring breathing support.   CXR with R lung field and left mid and lower lung field hazy opacities.  - Wean vent as able  - HTS nebs, duonebs  - 3/28 CXR showing increasing RUL consolidation       RENAL:  #Acute renal failure.   #Renal mass s/p biopsy  #Acidosis- RESOLVED   Oliguric. Remains hypervolemic.    - TOV 3/28, failed; replaced daniel on 4/1  - C/w doxazoin 1 mg po qhs to optimize subsequent TOV trial   - Trend Cr, uop, lytes  - S/p bicarb drip  - Per nephro, HD session #1 3/24, HD sessions #2 3/25, HD session #3 3/27; planning HD #4 3/29; currently will monitor off HD   - Initiated sodium bicarbonate 650 mg po TID   - 3/31 removed L IJV alexis   - Per pathology result of renal biopsy, noninvasive urothelial malignancy             GI:  #Shock liver  Unchanged  LFTs. Jaundice with hyperbilirubinemia (direct).   - Trend liver enzymes  - RUQ US showing enlarged periportal and periaortic enlarged lymph nodes and enlarged left lateral liver lobe and CBD 9 mm.       #Diet:  - NPO w/ tube feed. Changing to trickle feeds now due to increased residuals     HEMATOLOGIC/ONCOLGOGIC   #Sickle cell crisis  #Acute blood loss anemia- 2/2 uremia vs critical illness   Downtrending direct hyperbilirubinemia,  gradually. Recent Hb drop. Increasing reticulocyte count.   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 Bullae and desquamation without active signs of infection in LUE.   - D/c  aztreonam 2000 mg IV qd ( started 3/22- 3/27)   - Completed  meropenem 500 mg IV qd (started 3/27-3/31)   - CXR showing RUL consolidation    MSK:  #infiltration  Infiltration of sodium bicarb into left arm, now with arm distension and bullae. Elevated uric acid with nodule suggegstive of podagra however no signs of active gout flare.   -Appreciate orthopedic hand surgery, not compartment syndrome   - CTM    Ethics: Full Code   -Palliative following   -Family interested in having family meeting with palliative and neurology to discuss plans going forwards andw ill reach out when ready

## 2025-04-02 NOTE — PROGRESS NOTE ADULT - SUBJECTIVE AND OBJECTIVE BOX
MICU PROGRESS NOTE     HISTORY OF PRESENT ILLNESS         ROS: All negative except as listed above.    VITAL SIGNS:  ICU Vital Signs Last 24 Hrs  T(C): 36.5 (02 Apr 2025 04:00), Max: 38.4 (01 Apr 2025 20:00)  T(F): 97.7 (02 Apr 2025 04:00), Max: 101.1 (01 Apr 2025 20:00)  HR: 81 (02 Apr 2025 08:33) (81 - 120)  BP: --  BP(mean): --  ABP: 136/77 (02 Apr 2025 07:00) (118/56 - 146/79)  ABP(mean): 96 (02 Apr 2025 07:00) (76 - 101)  RR: 22 (02 Apr 2025 07:00) (20 - 29)  SpO2: 96% (02 Apr 2025 08:33) (92% - 100%)    O2 Parameters below as of 02 Apr 2025 08:33  Patient On (Oxygen Delivery Method): ventilator          Mode: AC/ CMV (Assist Control/ Continuous Mandatory Ventilation), RR (machine): 20, TV (machine): 460, FiO2: 40, PEEP: 6, ITime: 0.61, MAP: 13, PIP: 30  Plateau pressure:   P/F ratio:     04-01 @ 07:01  -  04-02 @ 07:00  --------------------------------------------------------  IN: 649 mL / OUT: 1350 mL / NET: -701 mL      CAPILLARY BLOOD GLUCOSE          ECG: reviewed.    PHYSICAL EXAM:    General: NAD, normal habitus, mild jaundice   Neuro: AAOx0,sedated non responsive to noxious stimuli, closed eyes, no verbal sounds, 4 mm pupils, sluggishly reactive pupils b/l   HEENT: icteric sclerae b/l , increased watery discharge from eyes   Chest: nonTTP   Heart: S1/S2, RRR   Lungs: CTA b/l, on MV   Abd: soft, nonTTP, nondistended   Ext: generalized trace pitting edema, LUE wwrapped, nodule on R 1st digit proximal phalanges   Pulses: radial 2+ b/l, dorsalis pedis 2+ b/l   Lines/tubes/drains: A line L femoral, OG tube, L IJV, flexicel   Psych: unable to assess     MEDICATIONS  (STANDING):  albuterol/ipratropium for Nebulization 3 milliLiter(s) Nebulizer every 6 hours  allopurinol 50 milliGRAM(s) Oral <User Schedule>  chlorhexidine 0.12% Liquid 15 milliLiter(s) Oral Mucosa every 12 hours  chlorhexidine 2% Cloths 1 Application(s) Topical daily  doxazosin 1 milliGRAM(s) Oral at bedtime  fentaNYL    Injectable 100 MICROGram(s) IV Push once  folic acid 1 milliGRAM(s) Oral daily  influenza   Vaccine 0.5 milliLiter(s) IntraMuscular once  levETIRAcetam  Solution 500 milliGRAM(s) Oral two times a day  mupirocin 2% Nasal 1 Application(s) Both Nostrils every 12 hours  petrolatum Ophthalmic Ointment 1 Application(s) Both EYES two times a day  propofol Infusion 50 MICROgram(s)/kG/Min (26.6 mL/Hr) IV Continuous <Continuous>    MEDICATIONS  (PRN):  midazolam Injectable 2 milliGRAM(s) IV Push every 4 hours PRN tremors      ALLERGIES:  Allergies    penicillin (Pruritus)  hydroxyurea (Other)  piperacillin-tazobactam (Urticaria)  ceftriaxone (Anaphylaxis)    Intolerances        LABS:                        7.0    22.07 )-----------( 425      ( 02 Apr 2025 01:12 )             18.9     04-02    134[L]  |  96[L]  |  93[H]  ----------------------------<  110[H]  4.6   |  15[L]  |  5.38[H]    Ca    9.2      02 Apr 2025 01:12  Phos  11.6     04-02  Mg     2.30     04-02    TPro  6.8  /  Alb  2.5[L]  /  TBili  12.5[H]  /  DBili  11.5[H]  /  AST  97[H]  /  ALT  24  /  AlkPhos  198[H]  04-02    PT/INR - ( 02 Apr 2025 01:12 )   PT: 12.0 sec;   INR: 1.01 ratio         PTT - ( 02 Apr 2025 01:12 )  PTT:29.0 sec  Urinalysis Basic - ( 02 Apr 2025 01:12 )    Color: x / Appearance: x / SG: x / pH: x  Gluc: 110 mg/dL / Ketone: x  / Bili: x / Urobili: x   Blood: x / Protein: x / Nitrite: x   Leuk Esterase: x / RBC: x / WBC x   Sq Epi: x / Non Sq Epi: x / Bacteria: x      ABG:  pH, Arterial: 7.36 (04-02-25 @ 01:12)  pCO2, Arterial: 31 mmHg (04-02-25 @ 01:12)  pO2, Arterial: 167 mmHg (04-02-25 @ 01:12)      vBG:    Micro:    Culture - Blood (collected 03-20-25 @ 10:04)  Source: Blood Blood-Peripheral  Final Report (03-25-25 @ 13:01):    No growth at 5 days        Culture - Sputum (collected 03-26-25 @ 11:45)  Source: ET Tube ET Tube  Gram Stain (03-26-25 @ 22:28):    Few Squamous epithelial cells per low power field    Moderate polymorphonuclear leukocytes per low power field    Few Yeast per oil power field  Final Report (03-28-25 @ 08:15):    Commensal lay consistent with body site    Culture - Sputum (collected 03-22-25 @ 17:15)  Source: ET Tube ET Tube  Gram Stain (03-22-25 @ 23:42):    Numerous polymorphonuclear leukocytes per low power field    Rare Squamous epithelial cells per low power field    No organisms seen per oil power field  Final Report (03-24-25 @ 06:52):    Commensal lay consistent with body site        RADIOLOGY & ADDITIONAL TESTS: Reviewed. MICU PROGRESS NOTE     HISTORY OF PRESENT ILLNESS     Overnight required 1 uPRBC due to Hb <7.0. 1 episode 101.1 F at 8 PM. HR 100s-120s. SBPs 110s-130s. MV V A/C RR 20 Tv 460 mL PEEP 6 Fio2 40. Triggering ventilator. Palliative talked with family yesterday.     Seen and examined at bedside, patient remains unable to enagage meaningfully.       ROS: All negative except as listed above.    VITAL SIGNS:  ICU Vital Signs Last 24 Hrs  T(C): 36.5 (02 Apr 2025 04:00), Max: 38.4 (01 Apr 2025 20:00)  T(F): 97.7 (02 Apr 2025 04:00), Max: 101.1 (01 Apr 2025 20:00)  HR: 81 (02 Apr 2025 08:33) (81 - 120)  BP: --  BP(mean): --  ABP: 136/77 (02 Apr 2025 07:00) (118/56 - 146/79)  ABP(mean): 96 (02 Apr 2025 07:00) (76 - 101)  RR: 22 (02 Apr 2025 07:00) (20 - 29)  SpO2: 96% (02 Apr 2025 08:33) (92% - 100%)    O2 Parameters below as of 02 Apr 2025 08:33  Patient On (Oxygen Delivery Method): ventilator          Mode: AC/ CMV (Assist Control/ Continuous Mandatory Ventilation), RR (machine): 20, TV (machine): 460, FiO2: 40, PEEP: 6, ITime: 0.61, MAP: 13, PIP: 30  Plateau pressure:   P/F ratio:     04-01 @ 07:01  -  04-02 @ 07:00  --------------------------------------------------------  IN: 649 mL / OUT: 1350 mL / NET: -701 mL      CAPILLARY BLOOD GLUCOSE          ECG: reviewed.    PHYSICAL EXAM:    General: NAD, normal habitus, mild jaundice   Neuro: AAOx0,sedated non responsive to noxious stimuli, closed eyes, no verbal sounds, 4 mm pupils, sluggishly reactive pupils b/l   HEENT: icteric sclerae b/l , increased watery discharge from eyes   Chest: nonTTP   Heart: S1/S2, RRR   Lungs: CTA b/l, on MV   Abd: soft, nonTTP, nondistended   Ext: generalized trace pitting edema, LUE wrapped, nodule on R 1st digit proximal phalanges   Pulses: radial 2+ b/l, dorsalis pedis 2+ b/l   Lines/tubes/drains: A line L femoral, OG tube, flexicel   Psych: unable to assess     MEDICATIONS  (STANDING):  albuterol/ipratropium for Nebulization 3 milliLiter(s) Nebulizer every 6 hours  allopurinol 50 milliGRAM(s) Oral <User Schedule>  chlorhexidine 0.12% Liquid 15 milliLiter(s) Oral Mucosa every 12 hours  chlorhexidine 2% Cloths 1 Application(s) Topical daily  doxazosin 1 milliGRAM(s) Oral at bedtime  fentaNYL    Injectable 100 MICROGram(s) IV Push once  folic acid 1 milliGRAM(s) Oral daily  influenza   Vaccine 0.5 milliLiter(s) IntraMuscular once  levETIRAcetam  Solution 500 milliGRAM(s) Oral two times a day  mupirocin 2% Nasal 1 Application(s) Both Nostrils every 12 hours  petrolatum Ophthalmic Ointment 1 Application(s) Both EYES two times a day  propofol Infusion 50 MICROgram(s)/kG/Min (26.6 mL/Hr) IV Continuous <Continuous>    MEDICATIONS  (PRN):  midazolam Injectable 2 milliGRAM(s) IV Push every 4 hours PRN tremors      ALLERGIES:  Allergies    penicillin (Pruritus)  hydroxyurea (Other)  piperacillin-tazobactam (Urticaria)  ceftriaxone (Anaphylaxis)    Intolerances        LABS:                        7.0    22.07 )-----------( 425      ( 02 Apr 2025 01:12 )             18.9     04-02    134[L]  |  96[L]  |  93[H]  ----------------------------<  110[H]  4.6   |  15[L]  |  5.38[H]    Ca    9.2      02 Apr 2025 01:12  Phos  11.6     04-02  Mg     2.30     04-02    TPro  6.8  /  Alb  2.5[L]  /  TBili  12.5[H]  /  DBili  11.5[H]  /  AST  97[H]  /  ALT  24  /  AlkPhos  198[H]  04-02    PT/INR - ( 02 Apr 2025 01:12 )   PT: 12.0 sec;   INR: 1.01 ratio         PTT - ( 02 Apr 2025 01:12 )  PTT:29.0 sec  Urinalysis Basic - ( 02 Apr 2025 01:12 )    Color: x / Appearance: x / SG: x / pH: x  Gluc: 110 mg/dL / Ketone: x  / Bili: x / Urobili: x   Blood: x / Protein: x / Nitrite: x   Leuk Esterase: x / RBC: x / WBC x   Sq Epi: x / Non Sq Epi: x / Bacteria: x      ABG:  pH, Arterial: 7.36 (04-02-25 @ 01:12)  pCO2, Arterial: 31 mmHg (04-02-25 @ 01:12)  pO2, Arterial: 167 mmHg (04-02-25 @ 01:12)      vBG:    Micro:    Culture - Blood (collected 03-20-25 @ 10:04)  Source: Blood Blood-Peripheral  Final Report (03-25-25 @ 13:01):    No growth at 5 days        Culture - Sputum (collected 03-26-25 @ 11:45)  Source: ET Tube ET Tube  Gram Stain (03-26-25 @ 22:28):    Few Squamous epithelial cells per low power field    Moderate polymorphonuclear leukocytes per low power field    Few Yeast per oil power field  Final Report (03-28-25 @ 08:15):    Commensal lay consistent with body site    Culture - Sputum (collected 03-22-25 @ 17:15)  Source: ET Tube ET Tube  Gram Stain (03-22-25 @ 23:42):    Numerous polymorphonuclear leukocytes per low power field    Rare Squamous epithelial cells per low power field    No organisms seen per oil power field  Final Report (03-24-25 @ 06:52):    Commensal lay consistent with body site        RADIOLOGY & ADDITIONAL TESTS: Reviewed.

## 2025-04-02 NOTE — PROGRESS NOTE ADULT - SUBJECTIVE AND OBJECTIVE BOX
Subjective  intubated and sedated     Objective    Vital signs  T(F): , Max: 101.1 (04-01-25 @ 20:00)  HR: 108 (04-02-25 @ 13:15)  BP: 113/68 (04-02-25 @ 13:15)  SpO2: 97% (04-02-25 @ 13:15)  Wt(kg): --    Output     OUT:    Indwelling Catheter - Urethral (mL): 1425 mL  Total OUT: 1425 mL    Total NET: -1425 mL      OUT:    Indwelling Catheter - Urethral (mL): 175 mL  Total OUT: 175 mL    Total NET: -175 mL          Gen: NAD  resp: intubated and sedated   : daniel secured in place, draining CYU    Labs      04-02 @ 01:12    WBC 22.07 / Hct 18.9  / SCr 5.38     04-01 @ 00:26    WBC 22.07 / Hct 20.0  / SCr 5.23           Urine Cx: ?  Blood Cx: ?    Imaging

## 2025-04-02 NOTE — PROGRESS NOTE ADULT - PROBLEM SELECTOR PLAN 1
Pt w/ oliguric MATA iso hemorrhagic shock and PEA arrest. Likely ATN. UA (3/16) w/ proteinuria and hematuria (21 RBC). Renal US (3/20) w/o hydronephrosis, and w/ large left renal subcapsular hematoma. CXR (3/22) w/ b/l hazy opacities. MRI w/ global hypoxic injury.   -NTDC placed and underwent HD on 3/24 iso worsening oliguric renal failure, w/ associated hyperkalemia and hypervolemia. Anuric despite prior diuretic challenge. Last HD 3/29, tolerated well. NTDC removed 3/31.     -Renal fx slightly worse is anemia s/p 2pRBC. Renal fx plateauing. Pt remains non-oliguric with ~ 1.3L/24hr. SCO2 15, start bicarb tabs 650 TID if possible. Will monitor off of HD for now.   -Transfuse as needed      Eldon Hamilton  Nephrology Fellow  Feel free to contact me on TEAMS  After 5 pm and on weekends please contact the on-call Fellow. Pt w/ oliguric MATA iso hemorrhagic shock and PEA arrest. Likely ATN. UA (3/16) w/ proteinuria and hematuria (21 RBC). Renal US (3/20) w/o hydronephrosis, and w/ large left renal subcapsular hematoma. CXR (3/22) w/ b/l hazy opacities. MRI w/ global hypoxic injury.   -NTDC placed and underwent HD on 3/24 iso worsening oliguric renal failure, w/ associated hyperkalemia and hypervolemia. Pt was anuric despite prior diuretic challenge. Last HD 3/29, tolerated well. NTDC removed 3/31.     -Renal fx slightly worse is anemia s/p 2pRBC. Renal fx plateauing. Pt remains non-oliguric with ~ 1.3L/24hr. SCO2 15, start bicarb tabs 650 TID if possible. Will monitor off of HD for now.   -Transfuse as needed      Eldon Hamilton  Nephrology Fellow  Feel free to contact me on TEAMS  After 5 pm and on weekends please contact the on-call Fellow.

## 2025-04-02 NOTE — PROCEDURE NOTE - NSBRONCHFINDINGS_GEN_A_CORE_FT
ETT in appropriate position.  Moderate amount of thick secretions in both bronchial trees but worse on the right  No active bleeding.  No endobronchial lesions.

## 2025-04-02 NOTE — CONSULT NOTE ADULT - ASSESSMENT
44 year old male with history of sickle cell disease, UE DVT, gout, HTN, with L renal mass admitted with acute anemia/ sickle cell crisis and evaluation of his left renal mass s/p left ureteroscopy and biopsy (3/19/25) with post operative course complicated by rapid responses for hypoglycemia c/b cardiac arrest x 4 minutes CPR/1 epi with ROSC, transferred to MICU for further care, found to have anoxic brain injury. Interventional pulmonology consulted for evaluation for tracheostomy.    Intubated 3/20/25 for cardiac arrest. Bedside POCUS evaluation revealed 1st tracheal ring with overlying small vessel which is easily compressible and non-pulsatile and without overlying innominate vessel.    #Cardiac arrest  #Tracheostomy evaluation    Recommendations:  - ongoing goals of care discussion including placement of tracheostomy  - IP will continue to follow for evaluation for tracheostomy placement    Recommendations are not final pending attending attestation.

## 2025-04-02 NOTE — CONSULT NOTE ADULT - SUBJECTIVE AND OBJECTIVE BOX
HPI:  44 year old male with history of sickle cell disease, UE DVT, gout, HTN, with L renal mass admitted with acute anemia/ sickle cell crisis and evaluation of his left renal mass s/p left ureteroscopy and biopsy (3/19/25) with post operative course complicated by rapid responses for hypoglycemia c/b cardiac arrest x 4 minutes CPR/1 epi with ROSC, transferred to MICU for further care, found to have anoxic brain injury. Interventional pulmonology consulted for evaluation for tracheostomy.      PAST MEDICAL & SURGICAL HISTORY:  Sickle cell anemia      Gout      Deep vein thrombosis (DVT)      Iron overload      Hypertension      History of cholecystectomy      History of removal of Port-a-Cath          FAMILY HISTORY:  Family history of sickle cell trait (Father, Mother)    Patient's father is  (Father)    Patient's mother is  (Mother)        SOCIAL HISTORY:  Smoking: [ ] Never Smoked [ ] Former Smoker (__ packs x ___ years) [ ] Current Smoker  (__ packs x ___ years)  Substance Use: [ ] Never Used [ ] Used ____  EtOH Use:  Marital Status: [ ] Single [ ]  [ ]  [ ]   Sexual History:   Occupation:  Recent Travel:  Country of Birth:  Advance Directives:    Allergies    penicillin (Pruritus)  hydroxyurea (Other)  piperacillin-tazobactam (Urticaria)  ceftriaxone (Anaphylaxis)    Intolerances        HOME MEDICATIONS:  Home Medications:  allopurinol 100 mg oral tablet: 1 tab(s) orally once a day (15 Mar 2025 13:59)  Eliquis 5 mg oral tablet: 1 tab(s) orally 2 times a day (15 Mar 2025 13:59)  folic acid 1 mg oral tablet: 1 tab(s) orally every 24 hours (15 Mar 2025 13:59)  NIFEdipine 30 mg oral tablet, extended release: 1 tab(s) orally once a day (15 Mar 2025 13:59)  oxyCODONE 30 mg oral tablet: 1 tab(s) orally every 4 hours as needed for  severe pain (15 Mar 2025 13:59)      REVIEW OF SYSTEMS:  All systems negative except as documented above.    OBJECTIVE:  ICU Vital Signs Last 24 Hrs  T(C): 36.6 (2025 12:00), Max: 38.4 (2025 20:00)  T(F): 97.9 (2025 12:00), Max: 101.1 (2025 20:00)  HR: 108 (2025 13:15) (81 - 120)  BP: 113/68 (2025 13:15) (113/68 - 113/68)  BP(mean): 82 (2025 13:15) (82 - 82)  ABP: 124/68 (2025 13:15) (124/68 - 156/81)  ABP(mean): 86 (2025 13:15) (85 - 104)  RR: 23 (2025 13:15) (20 - 35)  SpO2: 97% (2025 13:15) (86% - 100%)    O2 Parameters below as of 2025 12:48  Patient On (Oxygen Delivery Method): ventilator    O2 Concentration (%): 100      Mode: AC/ CMV (Assist Control/ Continuous Mandatory Ventilation), RR (machine): 20, TV (machine): 460, FiO2: 40, PEEP: 6, ITime: 0.61, MAP: 14, PIP: 30     @ 07:02 @ 07:00  --------------------------------------------------------  IN: 685.6 mL / OUT: 1425 mL / NET: -739.4 mL     @ 07:01  -  02 @ 13:49  --------------------------------------------------------  IN: 89.8 mL / OUT: 175 mL / NET: -85.2 mL      CAPILLARY BLOOD GLUCOSE          PHYSICAL EXAM:  General: intubated  HEENT: EOMI, sclera icteric  Neck: supple  Cardiovascular: RR  Respiratory: intubated on ventilator  Abdomen: soft  Extremities: warm and well perfused, bilateral LE edema  Skin: small lesion/healed wound on left ear  Neurological: AAOx0    [facility] MEDICATIONS:  Standing Meds:  albuterol/ipratropium for Nebulization 3 milliLiter(s) Nebulizer every 6 hours  allopurinol 50 milliGRAM(s) Oral <User Schedule>  chlorhexidine 0.12% Liquid 15 milliLiter(s) Oral Mucosa every 12 hours  chlorhexidine 2% Cloths 1 Application(s) Topical daily  doxazosin 1 milliGRAM(s) Oral at bedtime  folic acid 1 milliGRAM(s) Oral daily  influenza   Vaccine 0.5 milliLiter(s) IntraMuscular once  levETIRAcetam  Solution 500 milliGRAM(s) Oral two times a day  mupirocin 2% Nasal 1 Application(s) Both Nostrils every 12 hours  petrolatum Ophthalmic Ointment 1 Application(s) Both EYES two times a day  propofol Infusion 50 MICROgram(s)/kG/Min IV Continuous <Continuous>  sodium bicarbonate 650 milliGRAM(s) Oral three times a day      PRN Meds:  midazolam Injectable 2 milliGRAM(s) IV Push every 4 hours PRN      LABS:                        7.0    22.07 )-----------( 425      ( 2025 01:12 )             18.9     Hgb Trend: 7.0<--, 6.7<--, 7.2<--, 7.6<--, 7.4<--  04-02    134[L]  |  96[L]  |  93[H]  ----------------------------<  110[H]  4.6   |  15[L]  |  5.38[H]    Ca    9.2      2025 01:12  Phos  11.6     04-02  Mg     2.30         TPro  6.8  /  Alb  2.5[L]  /  TBili  12.5[H]  /  DBili  11.5[H]  /  AST  97[H]  /  ALT  24  /  AlkPhos  198[H]  -02    Creatinine Trend: 5.38<--, 5.23<--, 4.96<--, 4.80<--, 4.10<--, 5.39<--  PT/INR - ( 2025 01:12 )   PT: 12.0 sec;   INR: 1.01 ratio         PTT - ( 2025 01:12 )  PTT:29.0 sec  Urinalysis Basic - ( 2025 01:12 )    Color: x / Appearance: x / SG: x / pH: x  Gluc: 110 mg/dL / Ketone: x  / Bili: x / Urobili: x   Blood: x / Protein: x / Nitrite: x   Leuk Esterase: x / RBC: x / WBC x   Sq Epi: x / Non Sq Epi: x / Bacteria: x      Arterial Blood Gas:   @ 01:12  7.36/31/167/18/98.9/-7.2  ABG lactate: --  Arterial Blood Gas:   @ 00:26  7.39/35/104/21/99.3/-3.4  ABG lactate: --        MICROBIOLOGY:       RADIOLOGY:  [x] Reviewed and interpreted by me

## 2025-04-02 NOTE — PROGRESS NOTE ADULT - ASSESSMENT
44y M s/p  L ureteroscopy, RGP, biopsy, stent placement L kidney wash, L kidney lower pole mass biopsy on 3/19, RRT and code blue 3/20 now in MICU intubated and sedated on pressors with myoclonic activity and signs of anoxic brain injury on CTH 3/20/25.    3/19: s/p L ureteroscopy, RGP, biopsy, stent placement L kidney wash, L kidney lower pole mass biopsy  3/20: RRT 9a and Code Blue 930a, ROSC achieved in 4 minutes, transferred to MICU intubated and sedated on pressors s/p several pRBC, PLT, and plasma transfusions for Hgb 3.4  3/21: H/H 8.4/22.6, Cr 2.38, 2.85  3/22: off pressors, H/H 8.4/22.9, Cr 3.35, 3.68, lactate 1.5  3/23: H/H 8.2/22.9, back on levo 0.07, Cr 4.9, improved UOP clear yellow    Recommendations:  - Appreciate neuro recommendations, MRI consistent with global anoxic brain injury, prognosis for recovery of neurologic function is guarded, ongoing discussion with family regarding GOC. Appreciate palliative care consultation and recommendations.   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 Rosey Carolina Houston for Urology  [address] [place] [phone]

## 2025-04-02 NOTE — PROGRESS NOTE ADULT - ATTENDING COMMENTS
MATA  ATN    Cr rising however pt is now making urine. Plan for possible trach/peg tomorrow. Will plan for dialysis pre procedure in AM  Monitor labs and Is/Os, daily weights

## 2025-04-02 NOTE — PROGRESS NOTE ADULT - SUBJECTIVE AND OBJECTIVE BOX
[facility] DIVISION OF KIDNEY DISEASES AND HYPERTENSION   FOLLOW UP NOTE    --------------------------------------------------------------------------------  Chief Complaint:    24 hour events/subjective: Pt. was seen and examined today.   Still intubated, in MICU, non-oliguric now      PAST HISTORY  --------------------------------------------------------------------------------  No significant changes to PMH, PSH, FHx, SHx, unless otherwise noted    ALLERGIES & MEDICATIONS  --------------------------------------------------------------------------------  Allergies    penicillin (Pruritus)  hydroxyurea (Other)  piperacillin-tazobactam (Urticaria)  ceftriaxone (Anaphylaxis)    Intolerances      Standing Inpatient Medications  albuterol/ipratropium for Nebulization 3 milliLiter(s) Nebulizer every 6 hours  allopurinol 50 milliGRAM(s) Oral <User Schedule>  chlorhexidine 0.12% Liquid 15 milliLiter(s) Oral Mucosa every 12 hours  chlorhexidine 2% Cloths 1 Application(s) Topical daily  doxazosin 1 milliGRAM(s) Oral at bedtime  folic acid 1 milliGRAM(s) Oral daily  influenza   Vaccine 0.5 milliLiter(s) IntraMuscular once  levETIRAcetam  Solution 500 milliGRAM(s) Oral two times a day  mupirocin 2% Nasal 1 Application(s) Both Nostrils every 12 hours  petrolatum Ophthalmic Ointment 1 Application(s) Both EYES two times a day  propofol Infusion 50 MICROgram(s)/kG/Min IV Continuous <Continuous>    PRN Inpatient Medications  midazolam Injectable 2 milliGRAM(s) IV Push every 4 hours PRN      REVIEW OF SYSTEMS  --------------------------------------------------------------------------------  unable to obtain due to clinic status      VITALS/PHYSICAL EXAM  --------------------------------------------------------------------------------  T(C): 36.5 (04-02-25 @ 04:00), Max: 38.4 (04-01-25 @ 20:00)  HR: 81 (04-02-25 @ 08:33) (81 - 120)  BP: --  RR: 22 (04-02-25 @ 07:00) (20 - 29)  SpO2: 96% (04-02-25 @ 08:33) (92% - 100%)  Wt(kg): --        04-01-25 @ 07:01  -  04-02-25 @ 07:00  --------------------------------------------------------  IN: 649 mL / OUT: 1350 mL / NET: -701 mL        Physical Exam:  	Gen: Critically ill, intubated/sedated  	HEENT: +ETT  	Pulm: CTA B/L  	CV: S1S2+  	Abd: Soft, +BS   	Ext: +LE edema B/L, +UE edema b/l   	Neuro: Sedated  	Skin: Warm and dry  	Dialysis access: None      LABS/STUDIES  --------------------------------------------------------------------------------              7.0    22.07 >-----------<  425      [04-02-25 @ 01:12]              18.9     134  |  96  |  93  ----------------------------<  110      [04-02-25 @ 01:12]  4.6   |  15  |  5.38        Ca     9.2     [04-02-25 @ 01:12]      iCa    1.11     [04-02 @ 01:12]      Mg     2.30     [04-02-25 @ 01:12]      Phos  11.6     [04-02-25 @ 01:12]    TPro  6.8  /  Alb  2.5  /  TBili  12.5  /  DBili  11.5  /  AST  97  /  ALT  24  /  AlkPhos  198  [04-02-25 @ 01:12]    PT/INR: PT 12.0 , INR 1.01       [04-02-25 @ 01:12]  PTT: 29.0       [04-02-25 @ 01:12]    Uric acid 10.9      [04-02-25 @ 01:12]        [04-02-25 @ 01:12]    Creatinine Trend:  SCr 5.38 [04-02 @ 01:12]  SCr 5.23 [04-01 @ 00:26]  SCr 4.96 [03-31 @ 18:50]  SCr 4.80 [03-31 @ 01:30]  SCr 4.10 [03-30 @ 00:40]    Urinalysis - [04-02-25 @ 01:12]      Color  / Appearance  / SG  / pH       Gluc 110 / Ketone   / Bili  / Urobili        Blood  / Protein  / Leuk Est  / Nitrite       RBC  / WBC  / Hyaline  / Gran  / Sq Epi  / Non Sq Epi  / Bacteria       HBsAb 50.9      [03-29-25 @ 07:15]  HBsAg Nonreact      [03-17-25 @ 15:26]  HBcAb Nonreact      [03-29-25 @ 07:15]  HCV 0.10, Nonreact      [03-17-25 @ 15:26]  HIV Nonreact      [03-17-25 @ 15:26]      Tacrolimus  Cyclosporine  Sirolimus  Mycophenolate  BK PCR  CMV PCRCMVPCR Log: NotDetec Neb80AG/mL (12-27 @ 05:38)    Parvo PCR  EBV PCR

## 2025-04-02 NOTE — CHART NOTE - NSCHARTNOTEFT_GEN_A_CORE
Case discussed with MICU team. Family was at bedside today but did not provide any updates on Family meeting. Palliative care office did not receive any messages from patient's brother regarding time of family meeting. Bala has palliative office contact information.     Patient remains intubated. He is getting bronchoscopy today.     Palliative team remains available for assistance with goc.     In the event of newly developing, evolving, or worsening symptoms, please contact the Palliative Medicine team via pager (if the patient is at [facility] #[phone] or if the patient is at [facility] #[phone]) The Geriatric and Palliative Medicine service has coverage 24 hours a day/ 7 days a week to provide medical recommendations regarding symptom management needs via telephone.

## 2025-04-02 NOTE — CHART NOTE - NSCHARTNOTEFT_GEN_A_CORE
______________ CRITICAL CARE NUTRITION FOLLOW-UP ________________        --Medical Course--  44y  M w/ SSD, UE DVT, gout, HTN, with L renal mass admitted with acute anemia/ sickle cell crisis and evaluation of his left renal mass s/p left ureteroscopy and biopsy (3/19/25) with 24 hour post operative course complicated by rapid responses for hypoglycemia c/b cardiac arrest x 4 minutes CPR/1 epi with ROSC, transferred to MICU for further care, found to have anoxic brain injury.  Propofol for myoclonic jerking.    ~702 KCals to be provided by Propofol over 24hrs @ current rate.   Renal bx showing non-invasive urothelial carcinoma.  Also noted with early cirrhosis & w/ oliguric MATA s/p HD, as well as worsening R sided PNA.  S/p bronchoscopy today (4/2).       --Nutrition Course--  Weight status remains variable and overall increased since admission, presumably 2/2 to fluid accumulation.    Pt. has been receiving enteral nutrition with Nepro via OGT.  Previously at goal of 45mL/hr x 18hrs with additional Liquid Protein Supplement (LPS).   Tube feeding held for bronchoscopy today.  Remains off at time of RDN encounter.    Per chart review, changed Nepro (3/30) to trickle feeds (10mL/hr) due to "high residuals" (volume of residuals not specified, however).   Spoke with MICU physician.  Ok to resume tube feeding and to increase. Nepro remains warranted considering phosphorus persistently elevated and increasing.    Current suggested goal of Nepro is 40mL/hr x 18hrs + 4 packet Liquid Protein Supplement (LPS) per day to provide:  720mL total volume,   1296 KCals (+ 400 KCals from LPS)= 1696 KCals (+ Propofol KCals) = 2398 KCals total  58g protein (+ 60g protein from LPS) = 118g protein/d  523mL free water     TF+ LPS will give 23 KCals/kg & ~1.6g protein/kg Ideal Body Weight         __________Diet Order_________  Diet, NPO with Tube Feed:   Tube Feeding Modality: Orogastric  Nepro with Carb Steady (NEPRORTH)  Total Volume for 24 Hours (mL): 180  Continuous  Until Goal Tube Feed Rate (mL per Hour): 10  Tube Feed Duration (in Hours): 18  Tube Feed Start Time: 11:00  Tube Feed Stop Time: 05:00  Liquid Protein Supplement     Qty per Day:  3 (03-30-25 @ 11:50) [Active]              Weight:      Height:  69" / 175.26cm               Ideal Body Weight: 160lbs / 72.7kg  105.1kg (4/2)  109.9kg (3/28)  87.3kg (3/24)  111.4kg (3/22)  88.8kg (3/15 on admit)          _____Current & Estimated Energy Needs (based on IBW)_____  25-30 KCals/kg = 2994-0812 KCals/d  1.6-1.8g protein/kg= 116-131g protein/d      Edema: 2+ generalized    Last bowel movement: (4/2)    Skin: No pressure injuries    ________________________Pertinent Medications____________   MEDICATIONS  (STANDING):  albuterol/ipratropium for Nebulization 3 milliLiter(s) Nebulizer every 6 hours  allopurinol 50 milliGRAM(s) Oral <User Schedule>  chlorhexidine 0.12% Liquid 15 milliLiter(s) Oral Mucosa every 12 hours  chlorhexidine 2% Cloths 1 Application(s) Topical daily  doxazosin 1 milliGRAM(s) Oral at bedtime  folic acid 1 milliGRAM(s) Oral daily  influenza   Vaccine 0.5 milliLiter(s) IntraMuscular once  levETIRAcetam  Solution 500 milliGRAM(s) Oral two times a day  meropenem  IVPB 500 milliGRAM(s) IV Intermittent every 24 hours  mupirocin 2% Nasal 1 Application(s) Both Nostrils every 12 hours  petrolatum Ophthalmic Ointment 1 Application(s) Both EYES two times a day  propofol Infusion 50 MICROgram(s)/kG/Min (26.6 mL/Hr) IV Continuous <Continuous>  sodium bicarbonate 650 milliGRAM(s) Oral three times a day  vancomycin  IVPB 1500 milliGRAM(s) IV Intermittent once    MEDICATIONS  (PRN):  midazolam Injectable 2 milliGRAM(s) IV Push every 4 hours PRN tremors          _________________________Pertinent Labs____________________     04-02    134[L]  |  96[L]  |  93[H]  ----------------------------<  110[H]  4.6   |  15[L]  |  5.38[H]    Ca    9.2      02 Apr 2025 01:12  Phos  11.6     04-02  Mg     2.30     04-02        Triglycerides, Serum: 348 mg/dL (03-24-25 @ 18:00)      ________NUTRITION Dx_________    Pt. remains severely malnourished       PLAN/RECOMMENDATIONS:    1) Increase Nepro to goal of 40mL/hr x 18hrs (11a-5a) with 4 packet of Liquid Protein Supplement (LPS), daily  2) Obtain daily weights  3) Monitor tolerance to tube feeding, GI status, electrolytes, glucose    RDN remains available and will f/u PRN.          Courtney Mendez, BRENDEN, CDN       pager [phone] or MS Teams ______________ CRITICAL CARE NUTRITION FOLLOW-UP ________________        --Medical Course--  44y  M w/ SSD, UE DVT, gout, HTN, with L renal mass admitted with acute anemia/ sickle cell crisis and evaluation of his left renal mass s/p left ureteroscopy and biopsy (3/19/25) with 24 hour post operative course complicated by rapid responses for hypoglycemia c/b cardiac arrest x 4 minutes CPR/1 epi with ROSC, transferred to MICU for further care, found to have anoxic brain injury.  Propofol for myoclonic jerking.    ~702 KCals to be provided by Propofol over 24hrs @ current rate.   Renal bx showing non-invasive urothelial carcinoma.  Also noted with early cirrhosis & w/ oliguric MATA s/p HD, as well as worsening R sided PNA.  S/p bronchoscopy today (4/2).       --Nutrition Course--  Weight status remains variable and overall increased since admission, presumably 2/2 to fluid accumulation.    Pt. has been receiving enteral nutrition with Nepro via OGT.  Previously at goal of 45mL/hr x 18hrs with additional Liquid Protein Supplement (LPS).   Tube feeding held for bronchoscopy today.  Remains off at time of RDN encounter.    Per chart review, changed Nepro (3/30) to trickle feeds (10mL/hr) due to "high residuals" (volume of residuals not specified, however).   Spoke with MICU physician.  Ok to resume tube feeding and to increase. Nepro remains warranted considering phosphorus persistently elevated and increasing.    Current suggested goal of Nepro is 40mL/hr x 18hrs + 4 packet Liquid Protein Supplement (LPS) per day to provide:  720mL total volume,   1296 KCals (+ 400 KCals from LPS)= 1696 KCals (+ Propofol KCals) = 2398 KCals total  58g protein (+ 60g protein from LPS) = 118g protein/d  523mL free water     TF+ LPS will give 23 KCals/kg & ~1.6g protein/kg Ideal Body Weight         __________Diet Order_________  Diet, NPO with Tube Feed:   Tube Feeding Modality: Orogastric  Nepro with Carb Steady (NEPRORTH)  Total Volume for 24 Hours (mL): 180  Continuous  Until Goal Tube Feed Rate (mL per Hour): 10  Tube Feed Duration (in Hours): 18  Tube Feed Start Time: 11:00  Tube Feed Stop Time: 05:00  Liquid Protein Supplement     Qty per Day:  3 (03-30-25 @ 11:50) [Active]              Weight:      Height:  69" / 175.26cm               Ideal Body Weight: 160lbs / 72.7kg  105.1kg (4/2)  109.9kg (3/28)  87.3kg (3/24)  111.4kg (3/22)  88.8kg (3/15 on admit)          _____Current & Estimated Energy Needs (based on IBW)_____  25-30 KCals/kg = 4791-1049 KCals/d  1.6-1.8g protein/kg= 116-131g protein/d      Edema: 2+ generalized    Last bowel movement: (4/2)    Skin: No pressure injuries    ________________________Pertinent Medications____________   MEDICATIONS  (STANDING):  albuterol/ipratropium for Nebulization 3 milliLiter(s) Nebulizer every 6 hours  allopurinol 50 milliGRAM(s) Oral <User Schedule>  chlorhexidine 0.12% Liquid 15 milliLiter(s) Oral Mucosa every 12 hours  chlorhexidine 2% Cloths 1 Application(s) Topical daily  doxazosin 1 milliGRAM(s) Oral at bedtime  folic acid 1 milliGRAM(s) Oral daily  influenza   Vaccine 0.5 milliLiter(s) IntraMuscular once  levETIRAcetam  Solution 500 milliGRAM(s) Oral two times a day  meropenem  IVPB 500 milliGRAM(s) IV Intermittent every 24 hours  mupirocin 2% Nasal 1 Application(s) Both Nostrils every 12 hours  petrolatum Ophthalmic Ointment 1 Application(s) Both EYES two times a day  propofol Infusion 50 MICROgram(s)/kG/Min (26.6 mL/Hr) IV Continuous <Continuous>  sodium bicarbonate 650 milliGRAM(s) Oral three times a day  vancomycin  IVPB 1500 milliGRAM(s) IV Intermittent once    MEDICATIONS  (PRN):  midazolam Injectable 2 milliGRAM(s) IV Push every 4 hours PRN tremors          _________________________Pertinent Labs____________________     04-02    134[L]  |  96[L]  |  93[H]  ----------------------------<  110[H]  4.6   |  15[L]  |  5.38[H]    Ca    9.2      02 Apr 2025 01:12  Phos  11.6     04-02  Mg     2.30     04-02        Triglycerides, Serum: 348 mg/dL (03-24-25 @ 18:00)        PLAN/RECOMMENDATIONS:    1) Increase Nepro to goal of 40mL/hr x 18hrs (11a-5a) with 4 packets of Liquid Protein Supplement (LPS), daily  2) Obtain daily weights  3) Monitor tolerance to tube feeding, GI status, electrolytes, glucose    RDN remains available and will f/u PRN.          Courtney Mendez RDN, CDN       pager [phone] or MS Teams

## 2025-04-03 LAB
ALBUMIN SERPL ELPH-MCNC: 2.5 G/DL — LOW (ref 3.3–5)
ALP SERPL-CCNC: 196 U/L — HIGH (ref 40–120)
ALT FLD-CCNC: 25 U/L — SIGNIFICANT CHANGE UP (ref 4–41)
ANION GAP SERPL CALC-SCNC: 24 MMOL/L — HIGH (ref 7–14)
ANISOCYTOSIS BLD QL: SLIGHT — SIGNIFICANT CHANGE UP
APTT BLD: 28.8 SEC — SIGNIFICANT CHANGE UP (ref 24.5–35.6)
AST SERPL-CCNC: 102 U/L — HIGH (ref 4–40)
BASOPHILS # BLD AUTO: 0.35 K/UL — HIGH (ref 0–0.2)
BASOPHILS NFR BLD AUTO: 1.8 % — SIGNIFICANT CHANGE UP (ref 0–2)
BILIRUB DIRECT SERPL-MCNC: 11.6 MG/DL — SIGNIFICANT CHANGE UP (ref 0–0.3)
BILIRUB INDIRECT FLD-MCNC: 2.4 MG/DL — SIGNIFICANT CHANGE UP (ref 0–1)
BILIRUB SERPL-MCNC: 14 MG/DL — HIGH (ref 0.2–1.2)
BILIRUB SERPL-MCNC: 14.1 MG/DL — HIGH (ref 0.2–1.2)
BLOOD GAS VENOUS COMPREHENSIVE RESULT: SIGNIFICANT CHANGE UP
BUN SERPL-MCNC: 100 MG/DL — HIGH (ref 7–23)
CA-I BLD-SCNC: 1.11 MMOL/L — LOW (ref 1.15–1.29)
CALCIUM SERPL-MCNC: 9.1 MG/DL — SIGNIFICANT CHANGE UP (ref 8.4–10.5)
CHLORIDE SERPL-SCNC: 95 MMOL/L — LOW (ref 98–107)
CO2 SERPL-SCNC: 14 MMOL/L — LOW (ref 22–31)
CREAT SERPL-MCNC: 5.12 MG/DL — HIGH (ref 0.5–1.3)
DACRYOCYTES BLD QL SMEAR: SLIGHT — SIGNIFICANT CHANGE UP
EGFR: 13 ML/MIN/1.73M2 — LOW
EGFR: 13 ML/MIN/1.73M2 — LOW
EOSINOPHIL # BLD AUTO: 0.7 K/UL — HIGH (ref 0–0.5)
EOSINOPHIL NFR BLD AUTO: 3.6 % — SIGNIFICANT CHANGE UP (ref 0–6)
GIANT PLATELETS BLD QL SMEAR: PRESENT — SIGNIFICANT CHANGE UP
GLUCOSE SERPL-MCNC: 102 MG/DL — HIGH (ref 70–99)
GRAM STN FLD: ABNORMAL
HAPTOGLOB SERPL-MCNC: <20 MG/DL — LOW (ref 34–200)
HCT VFR BLD CALC: 21.1 % — LOW (ref 39–50)
HGB BLD-MCNC: 7.8 G/DL — LOW (ref 13–17)
IANC: 14.52 K/UL — HIGH (ref 1.8–7.4)
INR BLD: 1.02 RATIO — SIGNIFICANT CHANGE UP (ref 0.85–1.16)
LDH SERPL L TO P-CCNC: 557 U/L — HIGH (ref 135–225)
LYMPHOCYTES # BLD AUTO: 1.04 K/UL — SIGNIFICANT CHANGE UP (ref 1–3.3)
LYMPHOCYTES # BLD AUTO: 5.4 % — LOW (ref 13–44)
MACROCYTES BLD QL: SLIGHT — SIGNIFICANT CHANGE UP
MAGNESIUM SERPL-MCNC: 2.3 MG/DL — SIGNIFICANT CHANGE UP (ref 1.6–2.6)
MANUAL SMEAR VERIFICATION: SIGNIFICANT CHANGE UP
MCHC RBC-ENTMCNC: 29.1 PG — SIGNIFICANT CHANGE UP (ref 27–34)
MCHC RBC-ENTMCNC: 37 G/DL — HIGH (ref 32–36)
MCV RBC AUTO: 78.7 FL — LOW (ref 80–100)
MICROCYTES BLD QL: SLIGHT — SIGNIFICANT CHANGE UP
MONOCYTES # BLD AUTO: 1.22 K/UL — HIGH (ref 0–0.9)
MONOCYTES NFR BLD AUTO: 6.3 % — SIGNIFICANT CHANGE UP (ref 2–14)
NEUTROPHILS # BLD AUTO: 16.02 K/UL — HIGH (ref 1.8–7.4)
NEUTROPHILS NFR BLD AUTO: 82.9 % — HIGH (ref 43–77)
NIGHT BLUE STAIN TISS: SIGNIFICANT CHANGE UP
NRBC # BLD: 1 /100 WBCS — HIGH (ref 0–0)
NRBC BLD-RTO: 1 /100 WBCS — HIGH (ref 0–0)
OVALOCYTES BLD QL SMEAR: SLIGHT — SIGNIFICANT CHANGE UP
PHOSPHATE SERPL-MCNC: 12.3 MG/DL — HIGH (ref 2.5–4.5)
PLAT MORPH BLD: NORMAL — SIGNIFICANT CHANGE UP
PLATELET # BLD AUTO: 437 K/UL — HIGH (ref 150–400)
PLATELET COUNT - ESTIMATE: NORMAL — SIGNIFICANT CHANGE UP
POIKILOCYTOSIS BLD QL AUTO: SLIGHT — SIGNIFICANT CHANGE UP
POLYCHROMASIA BLD QL SMEAR: SLIGHT — SIGNIFICANT CHANGE UP
POTASSIUM SERPL-MCNC: 4.5 MMOL/L — SIGNIFICANT CHANGE UP (ref 3.5–5.3)
POTASSIUM SERPL-SCNC: 4.5 MMOL/L — SIGNIFICANT CHANGE UP (ref 3.5–5.3)
PROT SERPL-MCNC: 7.1 G/DL — SIGNIFICANT CHANGE UP (ref 6–8.3)
PROTHROM AB SERPL-ACNC: 11.8 SEC — SIGNIFICANT CHANGE UP (ref 9.9–13.4)
RBC # BLD: 2.68 M/UL — LOW (ref 4.2–5.8)
RBC # BLD: 2.68 M/UL — LOW (ref 4.2–5.8)
RBC # FLD: 21.1 % — HIGH (ref 10.3–14.5)
RBC BLD AUTO: ABNORMAL
RETICS #: 175.3 K/UL — HIGH (ref 25–125)
RETICS/RBC NFR: 6.5 % — HIGH (ref 0.5–2.5)
SCHISTOCYTES BLD QL AUTO: SLIGHT — SIGNIFICANT CHANGE UP
SODIUM SERPL-SCNC: 133 MMOL/L — LOW (ref 135–145)
SPECIMEN SOURCE: SIGNIFICANT CHANGE UP
SPECIMEN SOURCE: SIGNIFICANT CHANGE UP
TARGETS BLD QL SMEAR: SIGNIFICANT CHANGE UP
URATE SERPL-MCNC: 11 MG/DL — HIGH (ref 3.4–8.8)
VANCOMYCIN TROUGH SERPL-MCNC: 13.7 UG/ML — SIGNIFICANT CHANGE UP (ref 10–20)
WBC # BLD: 19.32 K/UL — HIGH (ref 3.8–10.5)
WBC # FLD AUTO: 19.32 K/UL — HIGH (ref 3.8–10.5)

## 2025-04-03 PROCEDURE — 76604 US EXAM CHEST: CPT | Mod: 26,GC

## 2025-04-03 PROCEDURE — 99291 CRITICAL CARE FIRST HOUR: CPT | Mod: GC,25

## 2025-04-03 PROCEDURE — 71045 X-RAY EXAM CHEST 1 VIEW: CPT | Mod: 26

## 2025-04-03 PROCEDURE — 93308 TTE F-UP OR LMTD: CPT | Mod: 26,GC

## 2025-04-03 PROCEDURE — 90935 HEMODIALYSIS ONE EVALUATION: CPT | Mod: GC

## 2025-04-03 RX ADMIN — IPRATROPIUM BROMIDE AND ALBUTEROL SULFATE 3 MILLILITER(S): .5; 2.5 SOLUTION RESPIRATORY (INHALATION) at 15:02

## 2025-04-03 RX ADMIN — IPRATROPIUM BROMIDE AND ALBUTEROL SULFATE 3 MILLILITER(S): .5; 2.5 SOLUTION RESPIRATORY (INHALATION) at 08:17

## 2025-04-03 RX ADMIN — Medication 650 MILLIGRAM(S): at 12:44

## 2025-04-03 RX ADMIN — Medication 15 MILLILITER(S): at 05:48

## 2025-04-03 RX ADMIN — Medication 15 MILLILITER(S): at 18:12

## 2025-04-03 RX ADMIN — Medication 650 MILLIGRAM(S): at 05:49

## 2025-04-03 RX ADMIN — Medication 1 APPLICATION(S): at 18:12

## 2025-04-03 RX ADMIN — Medication 1 APPLICATION(S): at 12:44

## 2025-04-03 RX ADMIN — PROPOFOL 26.6 MICROGRAM(S)/KG/MIN: 10 INJECTION, EMULSION INTRAVENOUS at 18:13

## 2025-04-03 RX ADMIN — LEVETIRACETAM 500 MILLIGRAM(S): 10 INJECTION, SOLUTION INTRAVENOUS at 05:48

## 2025-04-03 RX ADMIN — LEVETIRACETAM 500 MILLIGRAM(S): 10 INJECTION, SOLUTION INTRAVENOUS at 18:12

## 2025-04-03 RX ADMIN — Medication 650 MILLIGRAM(S): at 22:34

## 2025-04-03 RX ADMIN — FOLIC ACID 1 MILLIGRAM(S): 1 TABLET ORAL at 12:44

## 2025-04-03 RX ADMIN — MEROPENEM 100 MILLIGRAM(S): 1 INJECTION INTRAVENOUS at 15:12

## 2025-04-03 RX ADMIN — IPRATROPIUM BROMIDE AND ALBUTEROL SULFATE 3 MILLILITER(S): .5; 2.5 SOLUTION RESPIRATORY (INHALATION) at 03:31

## 2025-04-03 RX ADMIN — DOXAZOSIN MESYLATE 1 MILLIGRAM(S): 8 TABLET ORAL at 22:34

## 2025-04-03 RX ADMIN — IPRATROPIUM BROMIDE AND ALBUTEROL SULFATE 3 MILLILITER(S): .5; 2.5 SOLUTION RESPIRATORY (INHALATION) at 19:19

## 2025-04-03 RX ADMIN — PROPOFOL 26.6 MICROGRAM(S)/KG/MIN: 10 INJECTION, EMULSION INTRAVENOUS at 19:25

## 2025-04-03 RX ADMIN — MUPIROCIN CALCIUM 1 APPLICATION(S): 20 CREAM TOPICAL at 05:49

## 2025-04-03 RX ADMIN — Medication 1 APPLICATION(S): at 05:49

## 2025-04-03 NOTE — CHART NOTE - NSCHARTNOTEFT_GEN_A_CORE
: Martha Cervantes     INDICATION: AHRF     PROCEDURE:   [x ] LIMITED ECHO  [x ] LIMITED CHEST  [x ] LIMITED ABDOMINAL      FINDINGS:   Bilateral lung sliding  Bilateral A line superior anteriorly however more B lines predominant anterior inferiorly  Small Rt sided pleural effusion   Consolidation noted on the Rt side   static air bronchogram on the left side  Grossly normal LV systolic function without focal wall motion abnormalities  VTI ~17   IVC 1.7  small ascites   minimal urine noted on bladder scan with visualization of the balloon in the bladder     Supervised by Dr. Bartholomew   Should not be considered final until signed by attending. : Martha Cervantes     INDICATION: AHRF     PROCEDURE:   [x ] LIMITED ECHO  [x ] LIMITED CHEST  [x ] LIMITED ABDOMINAL      FINDINGS:   Bilateral lung sliding  Bilateral A line superior anteriorly however more B lines predominant anterior inferiorly  Small Rt sided pleural effusion   Consolidation noted on the Rt side   static air bronchogram on the left side  Grossly normal LV systolic function without focal wall motion abnormalities  VTI ~17   IVC 1.7  small ascites   minimal urine noted on bladder scan with visualization of the balloon in the bladder     Supervised by Dr. Bartholomew   Should not be considered final until signed by attending.    Attending Attestation:  I was present during the key portions of the procedure and immediately available during the entire procedure.  Zaid Bartholomew MD  Attending  Pulmonary & Critical Care Medicine

## 2025-04-03 NOTE — PROGRESS NOTE ADULT - PROBLEM SELECTOR PLAN 1
Pt w/ oliguric MATA iso hemorrhagic shock and PEA arrest. Likely ATN. UA (3/16) w/ proteinuria and hematuria (21 RBC). Renal US (3/20) w/o hydronephrosis, and w/ large left renal subcapsular hematoma. CXR (3/22) w/ b/l hazy opacities. MRI w/ global hypoxic injury.   -NTDC placed and underwent HD on 3/24 iso worsening oliguric renal failure, w/ associated hyperkalemia and hypervolemia. Pt was anuric despite prior diuretic challenge. Last HD 3/29, tolerated well. NTDC removed 3/31.     -Renal fx plateaued. Pt remains non-oliguric with ~ 1.7L/24hr. But elevated  and acidotic SCO2 14. Will do HD today (4/3), access replaced (4/2). Will also help optimize before trach and peg (planned this week)  -Transfuse as needed      Eldon Hamilton  Nephrology Fellow  Feel free to contact me on TEAMS  After 5 pm and on weekends please contact the on-call Fellow.

## 2025-04-03 NOTE — PROGRESS NOTE ADULT - SUBJECTIVE AND OBJECTIVE BOX
MICU PROGRESS NOTE     HISTORY OF PRESENT ILLNESS     NAEON. R femoral shiley placed. At noon had episode of desaturation that didn't resolve with suctioning and had to get bronchoscopy were thick secretions observed R>L and CXR showing increased RUL opacification. Per Bala, likely going down peg/trach tube; needs to get godmother here to see patient. Restarted on vancomycin and meropenem. Afebrile HR 90s-100s. SBPs 100s-130s. MV V A/C RR 20 ,  mL, PEEP 10, FiO2 60. Triggering ventilator.     Seen and examined at bedside, patient remains unable to engage meaningfully.       ROS: All negative except as listed above.    VITAL SIGNS:  ICU Vital Signs Last 24 Hrs  T(C): 37.1 (03 Apr 2025 04:00), Max: 37.1 (03 Apr 2025 04:00)  T(F): 98.8 (03 Apr 2025 04:00), Max: 98.8 (03 Apr 2025 04:00)  HR: 101 (03 Apr 2025 08:18) (81 - 108)  BP: 129/81 (03 Apr 2025 06:00) (98/57 - 135/82)  BP(mean): 95 (03 Apr 2025 06:00) (68 - 98)  ABP: 124/68 (02 Apr 2025 13:15) (124/68 - 156/81)  ABP(mean): 86 (02 Apr 2025 13:15) (86 - 104)  RR: 26 (03 Apr 2025 06:00) (20 - 35)  SpO2: 99% (03 Apr 2025 08:18) (86% - 100%)    O2 Parameters below as of 03 Apr 2025 08:18  Patient On (Oxygen Delivery Method): ventilator          Mode: AC/ CMV (Assist Control/ Continuous Mandatory Ventilation), RR (machine): 20, TV (machine): 460, FiO2: 60, PEEP: 10, ITime: 0.61, MAP: 16, PIP: 32  Plateau pressure:   P/F ratio:     04-02 @ 07:01  -  04-03 @ 07:00  --------------------------------------------------------  IN: 1507.8 mL / OUT: 1805 mL / NET: -297.2 mL      CAPILLARY BLOOD GLUCOSE          ECG: reviewed.    PHYSICAL EXAM:    General: NAD, normal habitus, mild jaundice   Neuro: AAOx0,sedated non responsive to noxious stimuli, closed eyes, no verbal sounds, 4 mm pupils, sluggishly reactive pupils b/l   HEENT: icteric sclerae b/l , increased watery discharge from eyes   Chest: nonTTP   Heart: S1/S2, RRR   Lungs: CTA b/l, on MV   Abd: soft, nonTTP, nondistended   Ext: generalized trace pitting edema, LUE wrapped, nodule on R 1st digit proximal phalanges   Pulses: radial 2+ b/l, dorsalis pedis 2+ b/l   Lines/tubes/drains: A line L femoral, OG tube, flexicel   Psych: unable to assess     MEDICATIONS  (STANDING):  albuterol/ipratropium for Nebulization 3 milliLiter(s) Nebulizer every 6 hours  allopurinol 50 milliGRAM(s) Oral <User Schedule>  chlorhexidine 0.12% Liquid 15 milliLiter(s) Oral Mucosa every 12 hours  chlorhexidine 2% Cloths 1 Application(s) Topical daily  doxazosin 1 milliGRAM(s) Oral at bedtime  folic acid 1 milliGRAM(s) Oral daily  influenza   Vaccine 0.5 milliLiter(s) IntraMuscular once  levETIRAcetam  Solution 500 milliGRAM(s) Oral two times a day  meropenem  IVPB 500 milliGRAM(s) IV Intermittent every 24 hours  mupirocin 2% Nasal 1 Application(s) Both Nostrils every 12 hours  petrolatum Ophthalmic Ointment 1 Application(s) Both EYES two times a day  propofol Infusion 50 MICROgram(s)/kG/Min (26.6 mL/Hr) IV Continuous <Continuous>  sodium bicarbonate 650 milliGRAM(s) Oral three times a day    MEDICATIONS  (PRN):      ALLERGIES:  Allergies    penicillin (Pruritus)  hydroxyurea (Other)  piperacillin-tazobactam (Urticaria)  ceftriaxone (Anaphylaxis)    Intolerances        LABS:                        7.8    19.32 )-----------( 437      ( 03 Apr 2025 01:35 )             21.1     04-03    133[L]  |  95[L]  |  100[H]  ----------------------------<  102[H]  4.5   |  14[L]  |  5.12[H]    Ca    9.1      03 Apr 2025 01:35  Phos  12.3     04-03  Mg     2.30     04-03    TPro  7.1  /  Alb  2.5[L]  /  TBili  14.1[H]  /  DBili  11.6  /  AST  102[H]  /  ALT  25  /  AlkPhos  196[H]  04-03    PT/INR - ( 03 Apr 2025 01:35 )   PT: 11.8 sec;   INR: 1.02 ratio         PTT - ( 03 Apr 2025 01:35 )  PTT:28.8 sec  Urinalysis Basic - ( 03 Apr 2025 01:35 )    Color: x / Appearance: x / SG: x / pH: x  Gluc: 102 mg/dL / Ketone: x  / Bili: x / Urobili: x   Blood: x / Protein: x / Nitrite: x   Leuk Esterase: x / RBC: x / WBC x   Sq Epi: x / Non Sq Epi: x / Bacteria: x      ABG:      vBG:  pH, Venous: 7.35 (04-03-25 @ 01:35)  pCO2, Venous: 32 mmHg (04-03-25 @ 01:35)  pO2, Venous: 160 mmHg (04-03-25 @ 01:35)  HCO3, Venous: 18 mmol/L (04-03-25 @ 01:35)    Micro:    Culture - Blood (collected 03-20-25 @ 10:04)  Source: Blood Blood-Peripheral  Final Report (03-25-25 @ 13:01):    No growth at 5 days        Culture - Sputum (collected 03-26-25 @ 11:45)  Source: ET Tube ET Tube  Gram Stain (03-26-25 @ 22:28):    Few Squamous epithelial cells per low power field    Moderate polymorphonuclear leukocytes per low power field    Few Yeast per oil power field  Final Report (03-28-25 @ 08:15):    Commensal lay consistent with body site    Culture - Sputum (collected 03-22-25 @ 17:15)  Source: ET Tube ET Tube  Gram Stain (03-22-25 @ 23:42):    Numerous polymorphonuclear leukocytes per low power field    Rare Squamous epithelial cells per low power field    No organisms seen per oil power field  Final Report (03-24-25 @ 06:52):    Commensal lay consistent with body site        RADIOLOGY & ADDITIONAL TESTS: Reviewed.

## 2025-04-03 NOTE — PROGRESS NOTE ADULT - ASSESSMENT
Patient is 44y Male with PMHx of sickle cell disease admitted for anemia concerning for sickle cell crisis s/p ureteroscopy w/ L kidney biopsy 3/19, post-operative course c/b acute hemorrhagic shock, PEA arrest with ROSC; currently with acute renal failure on HD and global anoxic brain injury and urothelial cancer and ongoing goals of care.       NEURO:  #Global anoxic brain injury   AAOx0. On sedation with no further myoclonus Guarded prognosis.   - On propofol; wean as tolerated   - D/c fenatnyl   - S/p versed gtt, weaned off 3/23  - Repeat CT B 3/24 showing diffuse sulcal effacement with increased intracrnail pressure, and few patchy foci of cortical blurring   - 3/25 MRIB with diffuse global abnormal supratentorial cortical restricted diffusion consistent with global hypoxic injury       #Seizures  No further myoclonus of upper body after restarting propofol.   - Witness myoclonic jerking concerning for seizures iso anoxic brain injury  - s/p 2.5g keppra load  - vEEG report: "Abundant myoclonic seizures in the setting of a severe diffuse/multifocal cerebral dysfunction which can be seen in the setting of sedative effect or due to an anoxic injury, with improvement after 20:50 suggesting a lowering of sedation"; will f/u with neuro recommendations  -C/w levetiracetam 500 mg po BID   - Trend enolase   - repeat vEEG 3/24 showing severe cerebral dysfunction   -C/w midazolam 2mg IV q8 PRN       CV:  #Shock - hemorrhagic shock s/p kidney biopsy s/p 5u pRBC, 3 plasma, 3 plt- RESOLVED   Hemodynamically stable.   last dose of lovenox AC on 3/18 at 5PM   Hgb 8 -> ~3   - Not on levophed, MAP goal>65  - Monitor cbc q24      #Hx of VTE (R IJ thrombus, L brachial DVT)  - CTM, hold AC given hemorrhage  - Bullae present on arm with DVT,  - Appreciate orthopedics hand surgery consult, not compartment syndrome       PULM:  #Acute hypoxic respiratory failure  In the setting of PEA arrest. Triggering ventilator on CPAP. Still requiring breathing support.   CXR with R lung field and left mid and lower lung field hazy opacities.  - Wean vent as able  - HTS nebs, duonebs  - 3/28 CXR showing increasing RUL consolidation       RENAL:  #Acute renal failure.   #Renal mass s/p biopsy  #Acidosis- RESOLVED   Oliguric. Remains hypervolemic.    - TOV 3/28, failed; replaced daniel on 4/1  - C/w doxazoin 1 mg po qhs to optimize subsequent TOV trial   - Trend Cr, uop, lytes  - S/p bicarb drip  - Per nephro, HD session #1 3/24, HD sessions #2 3/25, HD session #3 3/27; planning HD #4 3/29; currently will monitor off HD   - Initiated sodium bicarbonate 650 mg po TID   - 3/31 removed L IJV alexis   - Per pathology result of renal biopsy, noninvasive urothelial malignancy             GI:  #Shock liver  Unchanged  LFTs. Jaundice with hyperbilirubinemia (direct).   - Trend liver enzymes  - RUQ US showing enlarged periportal and periaortic enlarged lymph nodes and enlarged left lateral liver lobe and CBD 9 mm.       #Diet:  - NPO w/ tube feed. Changing to trickle feeds now due to increased residuals     HEMATOLOGIC/ONCOLGOGIC   #Sickle cell crisis  #Acute blood loss anemia- 2/2 uremia vs critical illness   Downtrending direct hyperbilirubinemia,  gradually. Recent Hb drop. Increasing reticulocyte count.   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 Bullae and desquamation without active signs of infection in LUE.   - D/c  aztreonam 2000 mg IV qd ( started 3/22- 3/27)   - Completed  meropenem 500 mg IV qd (started 3/27-3/31)   - CXR showing RUL consolidation    MSK:  #infiltration  Infiltration of sodium bicarb into left arm, now with arm distension and bullae. Elevated uric acid with nodule suggegstive of podagra however no signs of active gout flare.   -Appreciate orthopedic hand surgery, not compartment syndrome   - CTM    Ethics: Full Code   -Palliative following   -Family interested in having family meeting with palliative and neurology to discuss plans going forwards andw ill reach out when ready    Patient is 44y Male with PMHx of sickle cell disease admitted for anemia concerning for sickle cell crisis s/p ureteroscopy w/ L kidney biopsy 3/19, post-operative course c/b acute hemorrhagic shock, PEA arrest with ROSC; currently with acute renal failure on HD and global anoxic brain injury and urothelial cancer and ongoing goals of care.       NEURO:  #Global anoxic brain injury   AAOx0. On sedation with no further myoclonus Guarded prognosis.   - On propofol; wean as tolerated   - D/c fenatnyl   - S/p versed gtt, weaned off 3/23  - Repeat CT B 3/24 showing diffuse sulcal effacement with increased intracrnail pressure, and few patchy foci of cortical blurring   - 3/25 MRIB with diffuse global abnormal supratentorial cortical restricted diffusion consistent with global hypoxic injury       #Seizures  No further myoclonus of upper body after restarting propofol.   - Witness myoclonic jerking concerning for seizures iso anoxic brain injury  - s/p 2.5g keppra load  - vEEG report: "Abundant myoclonic seizures in the setting of a severe diffuse/multifocal cerebral dysfunction which can be seen in the setting of sedative effect or due to an anoxic injury, with improvement after 20:50 suggesting a lowering of sedation"; will f/u with neuro recommendations  -C/w levetiracetam 500 mg po BID   - Trend enolase   - repeat vEEG 3/24 showing severe cerebral dysfunction   -C/w midazolam 2mg IV q8 PRN       CV:  #Shock - hemorrhagic shock s/p kidney biopsy s/p 5u pRBC, 3 plasma, 3 plt- RESOLVED   Hemodynamically stable.   last dose of lovenox AC on 3/18 at 5PM   Hgb 8 -> ~3   - Not on levophed, MAP goal>65  - Monitor cbc q24      #Hx of VTE (R IJ thrombus, L brachial DVT)  - CTM, hold AC given hemorrhage  - Bullae present on arm with DVT,  - Appreciate orthopedics hand surgery consult, not compartment syndrome       PULM:  #Acute hypoxic respiratory failure  #Pna- 2/2 ventilator related   In the setting of PEA arrest. Triggering ventilator on CPAP. Still requiring breathing support.   CXR with R lung field and left mid and lower lung field hazy opacities.  - Wean vent as able  - HTS nebs, duonebs  - 3/28 CXR showing increasing RUL consolidation   -Empiric abx for pna       RENAL:  #Acute renal failure.   #Renal mass s/p biopsy  #Acidosis- RESOLVED   Oliguric. Remains hypervolemic.    - TOV 3/28, failed; replaced daniel on 4/1; TOV 4/2 with condom cath   - C/w doxazoin 1 mg po qhs to optimize subsequent TOV trial   - Trend Cr, uop, lytes  - S/p bicarb drip  - Per nephro, HD session #1 3/24, HD sessions #2 3/25, HD session #3 3/27; planning HD #4 3/29; 4/3 HD session tentative to optimize uremic-related bleeding in planning for trach  - Initiated sodium bicarbonate 650 mg po TID   - 3/31 removed L IJV shiley; 4/2 placed R fem shiley    - Per pathology result of renal biopsy, noninvasive urothelial malignancy             GI:  #Shock liver  Unchanged  LFTs. Jaundice with hyperbilirubinemia (direct).   - Trend liver enzymes  - RUQ US showing enlarged periportal and periaortic enlarged lymph nodes and enlarged left lateral liver lobe and CBD 9 mm.       #Diet:  - NPO w/ tube feed. Changing to trickle feeds now due to increased residuals     HEMATOLOGIC/ONCOLGOGIC   #Sickle cell crisis  #Acute blood loss anemia- 2/2 uremia vs critical illness   Downtrending direct hyperbilirubinemia,  gradually. Stable Hb. Increasing reticulocyte count.   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 CXR showing increased RUL opacification.   - D/c  aztreonam 2000 mg IV qd ( started 3/22- 3/27)   - Restarted  meropenem 500 mg IV qd (started 3/27-3/31); (4/2-)   - S/p vancomycin 1500 mg IV qd (4/2); f/u trough for dose dosing   - 4/2- CXR showing increasing RUL consolidation    MSK:  #infiltration  Infiltration of sodium bicarb into left arm, now with arm distension and bullae. Elevated uric acid with nodule suggegstive of podagra however no signs of active gout flare.   -Appreciate orthopedic hand surgery, not compartment syndrome   - CTM    Ethics: Full Code   -Palliative following   -Family likely going to go tracheostomy; pending godmothers to visit

## 2025-04-03 NOTE — PROGRESS NOTE ADULT - SUBJECTIVE AND OBJECTIVE BOX
[facility] DIVISION OF KIDNEY DISEASES AND HYPERTENSION   FOLLOW UP NOTE    --------------------------------------------------------------------------------  Chief Complaint:    24 hour events/subjective: Pt. was seen and examined today.  Still intubated, in MICU, remains non-oliguric       PAST HISTORY  --------------------------------------------------------------------------------  No significant changes to PMH, PSH, FHx, SHx, unless otherwise noted    ALLERGIES & MEDICATIONS  --------------------------------------------------------------------------------  Allergies    penicillin (Pruritus)  hydroxyurea (Other)  piperacillin-tazobactam (Urticaria)  ceftriaxone (Anaphylaxis)    Intolerances      Standing Inpatient Medications  albuterol/ipratropium for Nebulization 3 milliLiter(s) Nebulizer every 6 hours  allopurinol 50 milliGRAM(s) Oral <User Schedule>  chlorhexidine 0.12% Liquid 15 milliLiter(s) Oral Mucosa every 12 hours  chlorhexidine 2% Cloths 1 Application(s) Topical daily  doxazosin 1 milliGRAM(s) Oral at bedtime  folic acid 1 milliGRAM(s) Oral daily  influenza   Vaccine 0.5 milliLiter(s) IntraMuscular once  levETIRAcetam  Solution 500 milliGRAM(s) Oral two times a day  meropenem  IVPB 500 milliGRAM(s) IV Intermittent every 24 hours  petrolatum Ophthalmic Ointment 1 Application(s) Both EYES two times a day  propofol Infusion 50 MICROgram(s)/kG/Min IV Continuous <Continuous>  sodium bicarbonate 650 milliGRAM(s) Oral three times a day    PRN Inpatient Medications      REVIEW OF SYSTEMS  --------------------------------------------------------------------------------  unable to obtain due to clinic status      VITALS/PHYSICAL EXAM  --------------------------------------------------------------------------------  T(C): 36.2 (04-03-25 @ 08:00), Max: 37.1 (04-03-25 @ 04:00)  HR: 102 (04-03-25 @ 11:09) (95 - 108)  BP: 107/67 (04-03-25 @ 11:00) (98/57 - 135/82)  RR: 23 (04-03-25 @ 11:00) (20 - 35)  SpO2: 95% (04-03-25 @ 11:09) (86% - 100%)  Wt(kg): --        04-02-25 @ 07:01  -  04-03-25 @ 07:00  --------------------------------------------------------  IN: 1507.8 mL / OUT: 1855 mL / NET: -347.2 mL    04-03-25 @ 07:01  -  04-03-25 @ 11:23  --------------------------------------------------------  IN: 53.2 mL / OUT: 125 mL / NET: -71.8 mL        Physical Exam:  	Gen: Critically ill, intubated/sedated  	HEENT: +ETT  	Pulm: CTA B/L  	CV: S1S2+  	Abd: Soft, +BS   	Ext: +LE edema B/L, +UE edema b/l   	Neuro: Sedated  	Skin: Warm and dry  	Dialysis access: None      LABS/STUDIES  --------------------------------------------------------------------------------              7.8    19.32 >-----------<  437      [04-03-25 @ 01:35]              21.1     133  |  95  |  100  ----------------------------<  102      [04-03-25 @ 01:35]  4.5   |  14  |  5.12        Ca     9.1     [04-03-25 @ 01:35]      iCa    1.11     [04-03 @ 01:35]      Mg     2.30     [04-03-25 @ 01:35]      Phos  12.3     [04-03-25 @ 01:35]    TPro  7.1  /  Alb  2.5  /  TBili  14.1  /  DBili  11.6  /  AST  102  /  ALT  25  /  AlkPhos  196  [04-03-25 @ 01:35]    PT/INR: PT 11.8 , INR 1.02       [04-03-25 @ 01:35]  PTT: 28.8       [04-03-25 @ 01:35]    Uric acid 11.0      [04-03-25 @ 01:35]        [04-03-25 @ 01:35]    Creatinine Trend:  SCr 5.12 [04-03 @ 01:35]  SCr 5.38 [04-02 @ 01:12]  SCr 5.23 [04-01 @ 00:26]  SCr 4.96 [03-31 @ 18:50]  SCr 4.80 [03-31 @ 01:30]    Urinalysis - [04-03-25 @ 01:35]      Color  / Appearance  / SG  / pH       Gluc 102 / Ketone   / Bili  / Urobili        Blood  / Protein  / Leuk Est  / Nitrite       RBC  / WBC  / Hyaline  / Gran  / Sq Epi  / Non Sq Epi  / Bacteria       HBsAb 50.9      [03-29-25 @ 07:15]  HBsAg Nonreact      [03-17-25 @ 15:26]  HBcAb Nonreact      [03-29-25 @ 07:15]  HCV 0.10, Nonreact      [03-17-25 @ 15:26]  HIV Nonreact      [03-17-25 @ 15:26]      Tacrolimus  Cyclosporine  Sirolimus  Mycophenolate  BK PCR  CMV PCRCMVPCR Log: NotDetec Pno57IX/mL (12-27 @ 05:38)    Parvo PCR  EBV PCR

## 2025-04-03 NOTE — PROGRESS NOTE ADULT - ATTENDING COMMENTS
Patient is a 43 yo M w/ SSD, UE DVT, gout, HTN, with L renal mass admitted with acute anemia/ sickle cell crisis and evaluation of his left renal mass s/p left ureteroscopy and biopsy (3/19/25) with 24 hour post operative course complicated by rapid responses for hypoglycemia c/b cardiac arrest x 4 minutes CPR/1 epi with ROSC, transferred to MICU for further care, found to have anoxic brain injury    MRI showing anoxia. 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     # Cardiac arrest  # Anoxic encephalopathy  # SZ vs myoclonus  # Shock, resolved  # Anemia  # SSD  # Transaminitis  # MATA 2/2 to ATN  # DVT  # Hyperbilirubinemia, Early cirrhosis?  # Worsening R sided PNA  # Urothelial Cancer   - c/w Keppra   - c/w Propofol for now for severe myoclonus  - c/w mechanical ventilatory support, wean as tolerated  - c/w empiric abx Vanc/Jon, f/u BAL cultures  - c/w duonebs for airway clearance. Added 3%  - Monitor BMP, good UOP creatinine possibly peaked. WIll perform HD to optimize uremic coagulopathy prior to possible trach. f/u renal recs  - Transfuse PRN, now s/p embolization by IR  - Monitor hemolysis labs  - Early cirrhosis on US. Trend bili  - Hand lesions. Initially consulted vascular, vascular recommending plastics/hand--> no surgical interventions.   - Biopsy showing Non-invasive papillary urothelial carcinoma. Further management based on going [facility].   - c/w Doxazosin  - Will f/u family re [facility]    Zaid Bartholomew MD  Pulmonary & Critical Care Patient is a 43 yo M w/ SSD, UE DVT, gout, HTN, with L renal mass admitted with acute anemia/ sickle cell crisis and evaluation of his left renal mass s/p left ureteroscopy and biopsy (3/19/25) with 24 hour post operative course complicated by rapid responses for hypoglycemia c/b cardiac arrest x 4 minutes CPR/1 epi with ROSC, transferred to MICU for further care, found to have anoxic brain injury    MRI showing anoxia. 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     # Cardiac arrest  # Anoxic encephalopathy  # SZ vs myoclonus  # Shock, resolved  # Anemia  # SSD  # Transaminitis  # MATA 2/2 to ATN  # DVT  # Hyperbilirubinemia, Early cirrhosis?  # Worsening R sided PNA  # Urothelial Cancer   - c/w Keppra   - c/w Propofol for now for severe myoclonus  - c/w mechanical ventilatory support, wean as tolerated  - c/w empiric abx Vanc/Jon, f/u BAL cultures  - c/w duonebs for airway clearance. Added 3%  - Monitor BMP, good UOP creatinine possibly peaked. WIll perform HD to optimize uremic coagulopathy prior to possible trach. f/u renal recs  - Transfuse PRN, now s/p embolization by IR  - Monitor hemolysis labs  - Early cirrhosis on US. Trend bili  - Hand lesions. Initially consulted vascular, vascular recommending plastics/hand--> no surgical interventions.   - Biopsy showing Non-invasive papillary urothelial carcinoma. Further management based on going [facility].   - c/w Doxazosin, TOV  - Will f/u family re [facility]    Zaid Bartholomew MD  Pulmonary & Critical Care

## 2025-04-04 LAB
ALBUMIN SERPL ELPH-MCNC: 2.7 G/DL — LOW (ref 3.3–5)
ALP SERPL-CCNC: 259 U/L — HIGH (ref 40–120)
ALT FLD-CCNC: 30 U/L — SIGNIFICANT CHANGE UP (ref 4–41)
ANION GAP SERPL CALC-SCNC: 23 MMOL/L — HIGH (ref 7–14)
APTT BLD: 29.3 SEC — SIGNIFICANT CHANGE UP (ref 24.5–35.6)
AST SERPL-CCNC: 128 U/L — HIGH (ref 4–40)
BASOPHILS # BLD AUTO: 0.11 K/UL — SIGNIFICANT CHANGE UP (ref 0–0.2)
BASOPHILS NFR BLD AUTO: 0.6 % — SIGNIFICANT CHANGE UP (ref 0–2)
BILIRUB DIRECT SERPL-MCNC: 15.6 MG/DL — HIGH (ref 0–0.3)
BILIRUB INDIRECT FLD-MCNC: 1.5 MG/DL — HIGH (ref 0–1)
BILIRUB SERPL-MCNC: 17.1 MG/DL — HIGH (ref 0.2–1.2)
BILIRUB SERPL-MCNC: 17.1 MG/DL — HIGH (ref 0.2–1.2)
BLOOD GAS VENOUS COMPREHENSIVE RESULT: SIGNIFICANT CHANGE UP
BUN SERPL-MCNC: 60 MG/DL — HIGH (ref 7–23)
CALCIUM SERPL-MCNC: 8.9 MG/DL — SIGNIFICANT CHANGE UP (ref 8.4–10.5)
CHLORIDE SERPL-SCNC: 94 MMOL/L — LOW (ref 98–107)
CK SERPL-CCNC: 63 U/L — SIGNIFICANT CHANGE UP (ref 30–200)
CO2 SERPL-SCNC: 19 MMOL/L — LOW (ref 22–31)
CREAT SERPL-MCNC: 3.06 MG/DL — HIGH (ref 0.5–1.3)
CULTURE RESULTS: ABNORMAL
EGFR: 25 ML/MIN/1.73M2 — LOW
EGFR: 25 ML/MIN/1.73M2 — LOW
EOSINOPHIL # BLD AUTO: 0.56 K/UL — HIGH (ref 0–0.5)
EOSINOPHIL NFR BLD AUTO: 3.3 % — SIGNIFICANT CHANGE UP (ref 0–6)
GLUCOSE SERPL-MCNC: 107 MG/DL — HIGH (ref 70–99)
HAPTOGLOB SERPL-MCNC: <20 MG/DL — LOW (ref 34–200)
HCT VFR BLD CALC: 22.1 % — LOW (ref 39–50)
HGB BLD-MCNC: 8.1 G/DL — LOW (ref 13–17)
IANC: 13.28 K/UL — HIGH (ref 1.8–7.4)
IMM GRANULOCYTES NFR BLD AUTO: 1.4 % — HIGH (ref 0–0.9)
INR BLD: 1.04 RATIO — SIGNIFICANT CHANGE UP (ref 0.85–1.16)
LDH SERPL L TO P-CCNC: 517 U/L — HIGH (ref 135–225)
LYMPHOCYTES # BLD AUTO: 1.11 K/UL — SIGNIFICANT CHANGE UP (ref 1–3.3)
LYMPHOCYTES # BLD AUTO: 6.5 % — LOW (ref 13–44)
MAGNESIUM SERPL-MCNC: 2.1 MG/DL — SIGNIFICANT CHANGE UP (ref 1.6–2.6)
MCHC RBC-ENTMCNC: 28.4 PG — SIGNIFICANT CHANGE UP (ref 27–34)
MCHC RBC-ENTMCNC: 36.7 G/DL — HIGH (ref 32–36)
MCV RBC AUTO: 77.5 FL — LOW (ref 80–100)
MONOCYTES # BLD AUTO: 1.79 K/UL — HIGH (ref 0–0.9)
MONOCYTES NFR BLD AUTO: 10.5 % — SIGNIFICANT CHANGE UP (ref 2–14)
NEUTROPHILS # BLD AUTO: 13.28 K/UL — HIGH (ref 1.8–7.4)
NEUTROPHILS NFR BLD AUTO: 77.7 % — HIGH (ref 43–77)
NRBC # BLD AUTO: 0.06 K/UL — HIGH (ref 0–0)
NRBC # FLD: 0.06 K/UL — HIGH (ref 0–0)
NRBC BLD AUTO-RTO: 0 /100 WBCS — SIGNIFICANT CHANGE UP (ref 0–0)
PHOSPHATE SERPL-MCNC: 6.6 MG/DL — HIGH (ref 2.5–4.5)
PLATELET # BLD AUTO: 411 K/UL — HIGH (ref 150–400)
POTASSIUM SERPL-MCNC: 3.5 MMOL/L — SIGNIFICANT CHANGE UP (ref 3.5–5.3)
POTASSIUM SERPL-SCNC: 3.5 MMOL/L — SIGNIFICANT CHANGE UP (ref 3.5–5.3)
PROT SERPL-MCNC: 7.4 G/DL — SIGNIFICANT CHANGE UP (ref 6–8.3)
PROTHROM AB SERPL-ACNC: 12.1 SEC — SIGNIFICANT CHANGE UP (ref 9.9–13.4)
RBC # BLD: 2.85 M/UL — LOW (ref 4.2–5.8)
RBC # BLD: 2.85 M/UL — LOW (ref 4.2–5.8)
RBC # FLD: 21.5 % — HIGH (ref 10.3–14.5)
RETICS #: 151.9 K/UL — HIGH (ref 25–125)
RETICS/RBC NFR: 5.3 % — HIGH (ref 0.5–2.5)
SODIUM SERPL-SCNC: 136 MMOL/L — SIGNIFICANT CHANGE UP (ref 135–145)
SPECIMEN SOURCE: SIGNIFICANT CHANGE UP
TRIGL SERPL-MCNC: 364 MG/DL — HIGH
URATE SERPL-MCNC: 6.3 MG/DL — SIGNIFICANT CHANGE UP (ref 3.4–8.8)
WBC # BLD: 17.09 K/UL — HIGH (ref 3.8–10.5)
WBC # FLD AUTO: 17.09 K/UL — HIGH (ref 3.8–10.5)

## 2025-04-04 PROCEDURE — 31600 PLANNED TRACHEOSTOMY: CPT | Mod: GC

## 2025-04-04 PROCEDURE — 76604 US EXAM CHEST: CPT | Mod: 26,GC

## 2025-04-04 PROCEDURE — 71045 X-RAY EXAM CHEST 1 VIEW: CPT | Mod: 26,77

## 2025-04-04 PROCEDURE — 99291 CRITICAL CARE FIRST HOUR: CPT | Mod: GC,25

## 2025-04-04 PROCEDURE — 99232 SBSQ HOSP IP/OBS MODERATE 35: CPT | Mod: GC

## 2025-04-04 PROCEDURE — 31646 BRNCHSC W/THER ASPIR SBSQ: CPT | Mod: GC

## 2025-04-04 PROCEDURE — 99233 SBSQ HOSP IP/OBS HIGH 50: CPT | Mod: GC

## 2025-04-04 PROCEDURE — 93308 TTE F-UP OR LMTD: CPT | Mod: 26,GC

## 2025-04-04 PROCEDURE — 76536 US EXAM OF HEAD AND NECK: CPT | Mod: 26,GC

## 2025-04-04 PROCEDURE — 71045 X-RAY EXAM CHEST 1 VIEW: CPT | Mod: 26

## 2025-04-04 RX ORDER — MIDAZOLAM IN 0.9 % SOD.CHLORID 1 MG/ML
4 PLASTIC BAG, INJECTION (ML) INTRAVENOUS ONCE
Refills: 0 | Status: DISCONTINUED | OUTPATIENT
Start: 2025-04-04 | End: 2025-04-04

## 2025-04-04 RX ORDER — FENTANYL CITRATE-0.9 % NACL/PF 100MCG/2ML
100 SYRINGE (ML) INTRAVENOUS ONCE
Refills: 0 | Status: DISCONTINUED | OUTPATIENT
Start: 2025-04-04 | End: 2025-04-04

## 2025-04-04 RX ORDER — DESMOPRESSIN ACETATE 4 UG/ML
35 INJECTION INTRAVENOUS ONCE
Refills: 0 | Status: DISCONTINUED | OUTPATIENT
Start: 2025-04-04 | End: 2025-04-04

## 2025-04-04 RX ORDER — VANCOMYCIN HCL IN 5 % DEXTROSE 1.5G/250ML
1500 PLASTIC BAG, INJECTION (ML) INTRAVENOUS ONCE
Refills: 0 | Status: COMPLETED | OUTPATIENT
Start: 2025-04-04 | End: 2025-04-04

## 2025-04-04 RX ORDER — TRANEXAMIC ACID 1000 MG/10
1000 AMPUL (ML) INTRAVENOUS ONCE
Refills: 0 | Status: DISCONTINUED | OUTPATIENT
Start: 2025-04-04 | End: 2025-04-04

## 2025-04-04 RX ORDER — LIDOCAINE HCL/EPINEPHRINE/PF 1 %-1:200K
20 AMPUL (ML) INJECTION ONCE
Refills: 0 | Status: COMPLETED | OUTPATIENT
Start: 2025-04-04 | End: 2025-04-04

## 2025-04-04 RX ORDER — CISATRACURIUM BESYLATE 10 MG/5ML
20 INJECTION, SOLUTION INTRAVENOUS ONCE
Refills: 0 | Status: COMPLETED | OUTPATIENT
Start: 2025-04-04 | End: 2025-04-04

## 2025-04-04 RX ORDER — SOD PHOS DI, MONO/K PHOS MONO 250 MG
1 TABLET ORAL ONCE
Refills: 0 | Status: DISCONTINUED | OUTPATIENT
Start: 2025-04-04 | End: 2025-04-04

## 2025-04-04 RX ADMIN — Medication 1 APPLICATION(S): at 14:24

## 2025-04-04 RX ADMIN — Medication 1 APPLICATION(S): at 18:18

## 2025-04-04 RX ADMIN — Medication 15 MILLILITER(S): at 18:17

## 2025-04-04 RX ADMIN — MEROPENEM 100 MILLIGRAM(S): 1 INJECTION INTRAVENOUS at 18:17

## 2025-04-04 RX ADMIN — Medication 4 MILLIGRAM(S): at 12:25

## 2025-04-04 RX ADMIN — Medication 15 MILLILITER(S): at 05:26

## 2025-04-04 RX ADMIN — DOXAZOSIN MESYLATE 1 MILLIGRAM(S): 8 TABLET ORAL at 21:32

## 2025-04-04 RX ADMIN — Medication 100 MICROGRAM(S): at 12:20

## 2025-04-04 RX ADMIN — LEVETIRACETAM 500 MILLIGRAM(S): 10 INJECTION, SOLUTION INTRAVENOUS at 05:26

## 2025-04-04 RX ADMIN — PROPOFOL 26.6 MICROGRAM(S)/KG/MIN: 10 INJECTION, EMULSION INTRAVENOUS at 19:34

## 2025-04-04 RX ADMIN — IPRATROPIUM BROMIDE AND ALBUTEROL SULFATE 3 MILLILITER(S): .5; 2.5 SOLUTION RESPIRATORY (INHALATION) at 09:40

## 2025-04-04 RX ADMIN — Medication 4 MILLIGRAM(S): at 12:17

## 2025-04-04 RX ADMIN — IPRATROPIUM BROMIDE AND ALBUTEROL SULFATE 3 MILLILITER(S): .5; 2.5 SOLUTION RESPIRATORY (INHALATION) at 03:39

## 2025-04-04 RX ADMIN — IPRATROPIUM BROMIDE AND ALBUTEROL SULFATE 3 MILLILITER(S): .5; 2.5 SOLUTION RESPIRATORY (INHALATION) at 19:20

## 2025-04-04 RX ADMIN — Medication 650 MILLIGRAM(S): at 21:32

## 2025-04-04 RX ADMIN — Medication 1 APPLICATION(S): at 05:26

## 2025-04-04 RX ADMIN — Medication 20 MILLILITER(S): at 12:30

## 2025-04-04 RX ADMIN — Medication 250 MILLIGRAM(S): at 14:19

## 2025-04-04 RX ADMIN — LEVETIRACETAM 500 MILLIGRAM(S): 10 INJECTION, SOLUTION INTRAVENOUS at 19:55

## 2025-04-04 RX ADMIN — CISATRACURIUM BESYLATE 20 MILLIGRAM(S): 10 INJECTION, SOLUTION INTRAVENOUS at 12:26

## 2025-04-04 RX ADMIN — Medication 650 MILLIGRAM(S): at 05:26

## 2025-04-04 RX ADMIN — IPRATROPIUM BROMIDE AND ALBUTEROL SULFATE 3 MILLILITER(S): .5; 2.5 SOLUTION RESPIRATORY (INHALATION) at 15:27

## 2025-04-04 NOTE — PROGRESS NOTE ADULT - PROBLEM SELECTOR PLAN 1
Pt w/ oliguric MATA iso hemorrhagic shock and PEA arrest. Likely ATN. UA (3/16) w/ proteinuria and hematuria (21 RBC). Renal US (3/20) w/o hydronephrosis, and w/ large left renal subcapsular hematoma. CXR (3/22) w/ b/l hazy opacities. MRI w/ global hypoxic injury.   -NTDC placed and underwent HD on 3/24 iso worsening oliguric renal failure, w/ associated hyperkalemia and hypervolemia. Pt was anuric despite prior diuretic challenge. IJ NTDC removed 3/31 after HD on 3/29 as pt was non-oliguric and Cr plateaued to ~5.1-5.4. However, labs on 4/3 revealed elevated  and acidotic SCO2 14. Since pt planned for trach/PEG on 4/4, HD was done on 4/3 to optimize him via new right femoral NTDC placed on 4/2.   -Labs 4/4 reviewed. Electrolytes within range. No plan for HD today.  -Transfuse as needed. May require WALTER.

## 2025-04-04 NOTE — CONSULT NOTE ADULT - ASSESSMENT
Interventional Radiology    Evaluate for Procedure:     HPI: 44y Male with PMh sickle cell admitted for crisis s/p urethoscopy complicated by hemorrage s/p L renal embolization by IR with associated hemorraghic shock, PEA arrest. Patient currently in acute renal failure on hD, global anoxic brain injury, s/p trach 4/4. IR consulted for PEG placement.      Allergies: penicillin (Pruritus)  hydroxyurea (Other)  piperacillin-tazobactam (Urticaria)  ceftriaxone (Anaphylaxis)    Medications (Abx/Cardiac/Anticoagulation/Blood Products)    doxazosin: 1 milliGRAM(s) Oral (04-03 @ 22:34)  meropenem  IVPB: 100 mL/Hr IV Intermittent (04-03 @ 15:12)  vancomycin  IVPB: 250 mL/Hr IV Intermittent (04-04 @ 14:19)    Data:    T(C): 36.5  HR: 98  BP: 118/64  RR: 21  SpO2: 97%    -WBC 17.09 / HgB 8.1 / Hct 22.1 / Plt 411  -Na 136 / Cl 94 / BUN 60 / Glucose 107  -K 3.5 / CO2 19 / Cr 3.06  -ALT 30 / Alk Phos 259 / T.Bili 17.1  -INR 1.04 / PTT 29.3      Radiology:     Assessment/Plan:     -- Recommend repeat CT Abdomen noncontrast to evaluate for safe window for PEG placement.   -- IR will follow to provide final recommendations.   -- Given patient's critical condition, further optimization per primary team prior to any interventions is recommended.     --  Parish Flores MD PGY-2  Vascular and Interventional Radiology   Available on [employer] Teams    - Non-emergent consults: Place IR consult order in [place]  - Emergent issues (pager): [facility] [phone]; [facility] [phone]; [phone]  - Scheduling questions: [facility] [phone]; [facility] [phone]  - Clinic/outpatient booking: [facility] [phone]; [facility] [phone]

## 2025-04-04 NOTE — PROGRESS NOTE ADULT - ASSESSMENT
Patient is 44y Male with PMHx of sickle cell disease admitted for anemia concerning for sickle cell crisis s/p ureteroscopy w/ L kidney biopsy 3/19, post-operative course c/b acute hemorrhagic shock, PEA arrest with ROSC; currently with acute renal failure on HD and global anoxic brain injury and urothelial cancer and ongoing goals of care.       NEURO:  #Global anoxic brain injury   AAOx0. On sedation with no further myoclonus Guarded prognosis.   - On propofol; wean as tolerated   - D/c fenatnyl   - S/p versed gtt, weaned off 3/23  - Repeat CT B 3/24 showing diffuse sulcal effacement with increased intracrnail pressure, and few patchy foci of cortical blurring   - 3/25 MRIB with diffuse global abnormal supratentorial cortical restricted diffusion consistent with global hypoxic injury       #Seizures  No further myoclonus of upper body after restarting propofol.   - Witness myoclonic jerking concerning for seizures iso anoxic brain injury  - s/p 2.5g keppra load  - vEEG report: "Abundant myoclonic seizures in the setting of a severe diffuse/multifocal cerebral dysfunction which can be seen in the setting of sedative effect or due to an anoxic injury, with improvement after 20:50 suggesting a lowering of sedation"; will f/u with neuro recommendations  -C/w levetiracetam 500 mg po BID   - Trend enolase   - repeat vEEG 3/24 showing severe cerebral dysfunction   -C/w midazolam 2mg IV q8 PRN       CV:  #Shock - hemorrhagic shock s/p kidney biopsy s/p 5u pRBC, 3 plasma, 3 plt- RESOLVED   Hemodynamically stable.   last dose of lovenox AC on 3/18 at 5PM   Hgb 8 -> ~3   - Not on levophed, MAP goal>65  - Monitor cbc q24      #Hx of VTE (R IJ thrombus, L brachial DVT)  - CTM, hold AC given hemorrhage  - Bullae present on arm with DVT,  - Appreciate orthopedics hand surgery consult, not compartment syndrome       PULM:  #Acute hypoxic respiratory failure  #Pna- 2/2 ventilator related   In the setting of PEA arrest. Triggering ventilator on CPAP. Still requiring breathing support.   CXR with R lung field and left mid and lower lung field hazy opacities.  - Wean vent as able  - HTS nebs, duonebs  - 3/28 CXR showing increasing RUL consolidation   -Empiric abx for pna       RENAL:  #Acute renal failure.   #Renal mass s/p biopsy  #Acidosis- RESOLVED   Oliguric. Remains hypervolemic.    - TOV 3/28, failed; replaced daniel on 4/1; TOV 4/2 with condom cath   - C/w doxazoin 1 mg po qhs to optimize subsequent TOV trial   - Trend Cr, uop, lytes  - S/p bicarb drip  - Per nephro, HD session #1 3/24, HD sessions #2 3/25, HD session #3 3/27; planning HD #4 3/29; 4/3 HD session tentative to optimize uremic-related bleeding in planning for trach  - Initiated sodium bicarbonate 650 mg po TID   - 3/31 removed L IJV shiley; 4/2 placed R fem shiley    - Per pathology result of renal biopsy, noninvasive urothelial malignancy             GI:  #Shock liver  Unchanged  LFTs. Jaundice with hyperbilirubinemia (direct).   - Trend liver enzymes  - RUQ US showing enlarged periportal and periaortic enlarged lymph nodes and enlarged left lateral liver lobe and CBD 9 mm.       #Diet:  - NPO w/ tube feed. Changing to trickle feeds now due to increased residuals     HEMATOLOGIC/ONCOLGOGIC   #Sickle cell crisis  #Acute blood loss anemia- 2/2 uremia vs critical illness   Downtrending direct hyperbilirubinemia,  gradually. Stable Hb. Increasing reticulocyte count.   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 CXR showing increased RUL opacification.   - D/c  aztreonam 2000 mg IV qd ( started 3/22- 3/27)   - Restarted  meropenem 500 mg IV qd (started 3/27-3/31); (4/2-)   - S/p vancomycin 1500 mg IV qd (4/2); f/u trough for dose dosing   - 4/2- CXR showing increasing RUL consolidation    MSK:  #infiltration  Infiltration of sodium bicarb into left arm, now with arm distension and bullae. Elevated uric acid with nodule suggegstive of podagra however no signs of active gout flare.   -Appreciate orthopedic hand surgery, not compartment syndrome   - CTM    Ethics: Full Code   -Palliative following   -Family likely going to go tracheostomy; pending godmothers to visit      Patient is 44y Male with PMHx of sickle cell disease admitted for anemia concerning for sickle cell crisis s/p ureteroscopy w/ L kidney biopsy 3/19, post-operative course c/b acute hemorrhagic shock, PEA arrest with ROSC; currently with acute renal failure on HD and global anoxic brain injury and urothelial cancer; s/p tracheostomy.       NEURO:  #Global anoxic brain injury   AAOx0. On sedation with no further myoclonus Guarded prognosis.   - On propofol; wean as tolerated   - D/c fenatnyl   - S/p versed gtt, weaned off 3/23  - Repeat CT B 3/24 showing diffuse sulcal effacement with increased intracrnail pressure, and few patchy foci of cortical blurring   - 3/25 MRIB with diffuse global abnormal supratentorial cortical restricted diffusion consistent with global hypoxic injury       #Seizures  No further myoclonus of upper body after restarting propofol.   - Witness myoclonic jerking concerning for seizures iso anoxic brain injury  - s/p 2.5g keppra load  - vEEG report: "Abundant myoclonic seizures in the setting of a severe diffuse/multifocal cerebral dysfunction which can be seen in the setting of sedative effect or due to an anoxic injury, with improvement after 20:50 suggesting a lowering of sedation"; will f/u with neuro recommendations  -C/w levetiracetam 500 mg po BID   - Trend enolase   - repeat vEEG 3/24 showing severe cerebral dysfunction   -C/w midazolam 2mg IV q8 PRN       CV:  #Shock - hemorrhagic shock s/p kidney biopsy s/p 5u pRBC, 3 plasma, 3 plt- RESOLVED   Hemodynamically stable.   last dose of lovenox AC on 3/18 at 5PM   Hgb 8 -> ~3   - Not on levophed, MAP goal>65  - Monitor cbc q24      #Hx of VTE (R IJ thrombus, L brachial DVT)  - CTM, hold AC given hemorrhage  - Bullae present on arm with DVT,  - Appreciate orthopedics hand surgery consult, not compartment syndrome       PULM:  #Acute hypoxic respiratory failure  #Pna- 2/2 ventilator related   #Tracheostomy   In the setting of PEA arrest. Triggering ventilator on CPAP. Still requiring breathing support.   CXR with R lung field and left mid and lower lung field hazy opacities.  - Wean vent as able  - HTS nebs, duonebs  - 3/28 CXR showing increasing RUL consolidation   -Empiric abx for pna   -4/4 bedside tracheostomy       RENAL:  #Acute renal failure.   #Renal mass s/p biopsy  #Acidosis- RESOLVED   Oliguric. Remains hypervolemic.    - TOV 3/28, failed; replaced daniel on 4/1; TOV 4/3   - C/w doxazoin 1 mg po qhs to optimize subsequent TOV trial   - Trend Cr, uop, lytes  - S/p bicarb drip  - Per nephro, HD session #1 3/24, HD sessions #2 3/25, HD session #3 3/27; planning HD #4 3/29; 4/3 HD #5; will monitor off HD   -C/w sodium bicarbonate 650 mg po TID   - 3/31 removed L IJV shiley; 4/2 placed R fem shiley    - Per pathology result of renal biopsy, noninvasive urothelial malignancy             GI:  #Shock liver  Unchanged  LFTs. Jaundice with hyperbilirubinemia (direct).   - Trend liver enzymes  - RUQ US showing enlarged periportal and periaortic enlarged lymph nodes and enlarged left lateral liver lobe and CBD 9 mm.       #Diet:  - NPO w/ tube feed   - Pending GI for PEG; may need general-surgery consult for G tube due to hepatomegaly     HEMATOLOGIC/ONCOLGOGIC   #Sickle cell crisis  #Acute blood loss anemia- 2/2 sickle cells and uremia vs critical illness   Downtrending direct hyperbilirubinemia,  gradually. Stable Hb. Increasing reticulocyte count.   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 CXR showing increased RUL opacification.   - D/c  aztreonam 2000 mg IV qd ( started 3/22- 3/27)   - C/w meropenem 500 mg IV qd (started 3/27-3/31); (4/2-)   - S/p vancomycin 1500 mg IV qd (4/2); 2nd dose 4/3   - 4/2- CXR showing increasing RUL consolidation    MSK:  #infiltration  Infiltration of sodium bicarb into left arm, now with arm distension and bullae. Elevated uric acid with nodule suggegstive of podagra however no signs of active gout flare.   -Appreciate orthopedic hand surgery, not compartment syndrome   - CTM    Ethics: Full Code   -S/p trach and planning for PEG

## 2025-04-04 NOTE — PROGRESS NOTE ADULT - SUBJECTIVE AND OBJECTIVE BOX
Patient is a 44y old  Male who presents with a chief complaint of Referred by Hematologist for low Hg (04 Apr 2025 09:24)  s/p bronch, plan for Trach and PEG      MEDICATIONS  (STANDING):  albuterol/ipratropium for Nebulization 3 milliLiter(s) Nebulizer every 6 hours  allopurinol 50 milliGRAM(s) Oral <User Schedule>  chlorhexidine 0.12% Liquid 15 milliLiter(s) Oral Mucosa every 12 hours  chlorhexidine 2% Cloths 1 Application(s) Topical daily  desmopressin IVPB 35 MICROGram(s) IV Intermittent once  doxazosin 1 milliGRAM(s) Oral at bedtime  folic acid 1 milliGRAM(s) Oral daily  influenza   Vaccine 0.5 milliLiter(s) IntraMuscular once  levETIRAcetam  Solution 500 milliGRAM(s) Oral two times a day  lidocaine 1%/epinephrine 1:100,000 Inj 20 milliLiter(s) Local Injection once  meropenem  IVPB 500 milliGRAM(s) IV Intermittent every 24 hours  petrolatum Ophthalmic Ointment 1 Application(s) Both EYES two times a day  propofol Infusion 50 MICROgram(s)/kG/Min (26.6 mL/Hr) IV Continuous <Continuous>  sodium bicarbonate 650 milliGRAM(s) Oral three times a day  vancomycin  IVPB 1500 milliGRAM(s) IV Intermittent once    MEDICATIONS  (PRN):        Vital Signs Last 24 Hrs  T(C): 36.5 (04 Apr 2025 12:00), Max: 37.3 (04 Apr 2025 00:00)  T(F): 97.7 (04 Apr 2025 12:00), Max: 99.1 (04 Apr 2025 00:00)  HR: 104 (04 Apr 2025 12:17) (103 - 116)  BP: 113/79 (04 Apr 2025 12:17) (93/56 - 125/80)  BP(mean): 91 (04 Apr 2025 12:17) (66 - 92)  RR: 19 (04 Apr 2025 12:17) (19 - 25)  SpO2: 96% (04 Apr 2025 12:17) (95% - 99%)    Parameters below as of 04 Apr 2025 09:40  Patient On (Oxygen Delivery Method): ventilator        PE  Anicteric, MMM  RRR  CTAB  Abd soft, NT, ND  No c/c/e  No rash grossly                            8.1    17.09 )-----------( 411      ( 04 Apr 2025 00:35 )             22.1       04-04    136  |  94[L]  |  60[H]  ----------------------------<  107[H]  3.5   |  19[L]  |  3.06[H]    Ca    8.9      04 Apr 2025 00:35  Phos  6.6     04-04  Mg     2.10     04-04    TPro  7.4  /  Alb  2.7[L]  /  TBili  17.1[H]  /  DBili  15.6[H]  /  AST  128[H]  /  ALT  30  /  AlkPhos  259[H]  04-04

## 2025-04-04 NOTE — CHART NOTE - NSCHARTNOTEFT_GEN_A_CORE
With the patient in a supine position, the patient had an indwelling 7.0 endotracheal tube through which bronchoscopy was performed for guiding the tracheostomy procedure.  We positioned the patient’s neck in hyperextension and inspected and examined the neck anatomy, and then we selected a spot for the tracheostomy between the 1st and the 2nd tracheal rings. After the neck area was prepared in a sterile fashion, we injected lidocaine mixed with epinephrine in the four quadrants for subcutaneous analgesia. Then we use the small bore finder needle and advanced it perpendicular to the airway while the trachea was held in place. Once the trachea was entered in midline we then advanced the large bore needle right next to the finder needle, remove the finder needle and advance the guide wire through the large needle. The needle was then removed, and then we performed a small horizontal incision to the subcutaneous tissue, 1 cm above and 1 cm below the insertion point. We then used the small blue dilator available in the percutaneous tracheostomy kit firmly advancing it and rotating it over the guide wire. The dilator was then removed and then we inserted the cone dilator over the white stiffening catheter and over the guide wire until the thick black line was noted bronchoscopically. At that point, the cone dilator was removed, and we inserted a number 7 non fenestrated Shiley tracheostomy tube over the _ Bulgarian dilator and over the guide wire with the stiffening catheter. Once the tracheostomy tube was properly placed inside the airway and noted bronchoscopically, we removed the dilator, the guide wire and the stiffening catheter. Four 2-0 Vicryl sutures were applied to secure the tracheostomy tube in the four quadrants and a tracheostomy tie was placed as well. There was no evidence of bleeding and the tip of the tube was at _ cm away from the main kennedy and __ away from the cords. Please see provation for tracheostomy details

## 2025-04-04 NOTE — PROGRESS NOTE ADULT - NS ATTEND AMEND GEN_ALL_CORE FT
Agree with above assessment and plan.   Plan for trach/PEG. s/p bronch. Hemoglobin 8.1. Has iron overload. His baseline hemoglobin is 5-7. Monitor closely, has been receiving frequent PRBC. Agree with above assessment and plan.   Plan for trach/PEG. s/p bronch. Hemoglobin 8.1. Has iron overload. His baseline hemoglobin is 5-7. Monitor closely, has been receiving frequent PRBC. Family not at bedside when rounding. If not bleeding consider lower threshold of transfusion to prevent further iron overload and antibody exposure.

## 2025-04-04 NOTE — CONSULT NOTE ADULT - ATTENDING COMMENTS
44 year old male with history of sickle cell disease, UE DVT, gout, HTN, with L renal mass admitted with acute anemia/ sickle cell crisis and evaluation of his left renal mass s/p left ureteroscopy and biopsy (3/19/25) with post operative course complicated by rapid responses for hypoglycemia c/b cardiac arrest x 4 minutes CPR/1 epi with ROSC, transferred to MICU for further care, found to have anoxic brain injury. Interventional pulmonology consulted for evaluation for tracheostomy.After addressing the goals of care, given the family’s decision to continue mechanical ventilation, patient may require tracheostomy tube placement. Based on ventilatory requirements, neck anatomy and comorbidities, we will proceed with bedside tracheostomy if family agreeable.     Critically ill patient with bedside visits. Discussed with ICU team.
Patient discussed with resident.  Films reviewed.  Agree with assessment and plan.
GI consulted for peg tube evaluation.   CT abdomen/pelvis reviewed this afternoon. Due to hepatomegaly, no window visualized.   Unlikely to have safe window for endoscopic placement of peg tube.   Please consult IR vs surgery for feeding tube placement.   rest of care per ICU team   GI to sign off, please call w questions
L arm swelling and bullae  intact perfusion  L arm elevation and compression   hand surgery consult
Detailed neuro exam:  ROS: Unable to obtain due to the patient is currently orally intubated.  Please note, neuro exam is very limited due to the patient on mechanical ventilation via orally intubated and on the propofol and versed.  General: Patient is comfortably lying on bed without any acute distress.  Mental status: On verbal command, patient unable to follow any simple or complex commands.  Cranial exams: Brainstem reflexes are intact cornea, conjunctiva and gag reflexes. Face looks symmetric. Pupils are symmetric, reactive to light bilaterally.  Power: On noxious stimuli the patient had 0/5 bilateral four extremities.  Sensation: On noxious stimuli patient did not grimace at all four extremities.  Coordination and gait: Patient did not participate in the setting of comatose.     Impression and plan:  Ms. Downey is a 44 year old man unknown handed man with PMHX sickle cell prior acute chest syndrome, iron overload, Gout, HTN, RUE DVT (was on AC), renal lesion who was admitted due to the sickle cell crisis, received pRBCs and  s/p L ureteroscopy w/ biopsy and stent placement.  hospital course complicated by ams/hypoglycemia and code blue 3/20 ~930am for PEA arrest.  Neurology consulted because of myoclonus s/p cardiac arrest.   CT scan head to my shows loss of gray-white differentiation ? early sing of hypoxic ischemic brain injury.  Patient would benefit from MRI brain with and without contrast once patient stable.  EEG to my eye shows burst suppression pattern.   Continue VEEG  Last night received the Keppra 2500 mg once and will start on standing dose 750 mg BID.   Continue medical management, neuro- check and fall precaution.  GI and DVT prophylaxis.    Patient is at high risk for complications and morbidity or mortality. There is high probability of imminent or life threatening deterioration in the patient's condition.  Patient is unable or incompetent to participate in giving a history and/or making decisions and discussion is necessary for determining treatment decisions.    I discussed the diagnosis, treatment plan and prognosis with the patient's family. Risk benefit and alternatives to the treatment were discussed. All questions and concerns were addressed. The patient's family demonstrated good understanding of the treatment plan.    My cumulative time taking care of this critically ill patient is 80 minutes  If you have any further questions, please do not hesitate to contact our team.  Thank you for allowing us to participate in this patient care.      Josiah Smith MD
MATA, Hyperkalemia, oliguria  Hypotensive on pressors  intubated and v/ol  will attempt dialysis after cathter placement.  if not able to do dialysis, then will need to do CRRT  discussed with MICU   dose meds per eGFR

## 2025-04-04 NOTE — CHART NOTE - NSCHARTNOTEFT_GEN_A_CORE
Case discussed with MICU resident, Dr. Malhotra. Patient s/p Trach today and planning for peg tube.     At this time, palliative team will sign off as goals are clear.   Thank you for allowing us to participate in your patient's care.     Eve Tavares D.O.   Palliative Medicine

## 2025-04-04 NOTE — CONSULT NOTE ADULT - ASSESSMENT
44y Male with PMHx of sickle cell disease admitted for anemia concerning for sickle cell crisis s/p ureteroscopy w/ L kidney biopsy 3/19, post-operative course c/b acute hemorrhagic shock, PEA arrest with ROSC; currently with acute renal failure on HD and global anoxic brain injury and urothelial cancer; s/p tracheostomy.     GI was consulted for PEG evaluation.     Assessment  #Anoxic brain injury  #Hx of PEA arrest in the setting of hemorrhogic shock  #Acute renal failure on HD  - S/p trach 4/4  - Reviewed CT A/P: significant hepatomegaly with liver edge overlying the stomach. Unlikely to have a safe window for endoscopic G-tube placement    Recommendations:  - Unlikely to have a safe window for PEG  - If IR is unable to place a radiological G-tube, may need surgical G-tube or J-tube for enteral feeding.   - GI team to sign off. Please reconsult as needed.     D/w Dr. Clarence Lorenzo  GI/Hepatology Fellow    MONDAY-FRIDAY 8AM-5PM:  Pager# [phone] ([facility]) or [phone] ([facility])    NON-URGENT CONSULTS:  Please email [email or web address]  AT NIGHT AND ON WEEKENDS:  Contact on-call GI fellow via AMION by paging or hospital  from 5pm-8am and on weekends/holidays

## 2025-04-04 NOTE — PROGRESS NOTE ADULT - ATTENDING COMMENTS
Patient is a 45 yo M w/ SSD, UE DVT, gout, HTN, with L renal mass admitted with acute anemia/ sickle cell crisis and evaluation of his left renal mass s/p left ureteroscopy and biopsy (3/19/25) with 24 hour post operative course complicated by rapid responses for hypoglycemia c/b cardiac arrest x 4 minutes CPR/1 epi with ROSC, transferred to MICU for further care, found to have anoxic brain injury    MRI showing anoxia. 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     # Cardiac arrest  # Anoxic encephalopathy  # SZ vs myoclonus  # Shock, resolved  # Anemia  # SSD  # Transaminitis  # MATA 2/2 to ATN  # DVT  # Hyperbilirubinemia, Early cirrhosis?  # Worsening R sided PNA  # Urothelial Cancer   - c/w Keppra   - c/w Propofol for now for severe myoclonus, will wean after Trach  - c/w mechanical ventilatory support, wean as tolerated. Trach today with IP. GI consult for PEG  - c/w empiric abx Vanc/Jon  - c/w duonebs for airway clearance. Added 3%  - Monitor BMP, good UOP creatinine possibly peaked. s/p HD to optimize uremic coagulopathy prior to possible trach. f/u renal recs. Will leave shiley in for now, may need permacath given persistent Bili/nephrotoxicity  - Transfuse PRN, now s/p embolization by IR  - Monitor hemolysis labs  - Early cirrhosis on US. Trend bili  - Hand lesions. Initially consulted vascular, vascular recommending plastics/hand--> no surgical interventions.   - Biopsy showing Non-invasive papillary urothelial carcinoma. Further management based on going GOC.   - c/w Doxazosin, TOV today    Zaid Bartholomew MD  Pulmonary & Critical Care

## 2025-04-04 NOTE — PROGRESS NOTE ADULT - SUBJECTIVE AND OBJECTIVE BOX
[facility] DIVISION OF KIDNEY DISEASES AND HYPERTENSION --    Reason for consult:    24 hour events/subjective: Patient seen and examined at bedside.      PAST HISTORY  --------------------------------------------------------------------------------  No significant changes to PMH, PSH, FHx, SHx, unless otherwise noted    ALLERGIES & MEDICATIONS  --------------------------------------------------------------------------------  Allergies    penicillin (Pruritus)  hydroxyurea (Other)  piperacillin-tazobactam (Urticaria)  ceftriaxone (Anaphylaxis)      Standing Inpatient Medications  albuterol/ipratropium for Nebulization 3 milliLiter(s) Nebulizer every 6 hours  allopurinol 50 milliGRAM(s) Oral <User Schedule>  chlorhexidine 0.12% Liquid 15 milliLiter(s) Oral Mucosa every 12 hours  chlorhexidine 2% Cloths 1 Application(s) Topical daily  doxazosin 1 milliGRAM(s) Oral at bedtime  folic acid 1 milliGRAM(s) Oral daily  influenza   Vaccine 0.5 milliLiter(s) IntraMuscular once  levETIRAcetam  Solution 500 milliGRAM(s) Oral two times a day  meropenem  IVPB 500 milliGRAM(s) IV Intermittent every 24 hours  petrolatum Ophthalmic Ointment 1 Application(s) Both EYES two times a day  potassium phosphate / sodium phosphate Powder (PHOS-NaK) 1 Packet(s) Oral once  propofol Infusion 50 MICROgram(s)/kG/Min IV Continuous <Continuous>  sodium bicarbonate 650 milliGRAM(s) Oral three times a day    PRN Inpatient Medications      REVIEW OF SYSTEMS  --------------------------------------------------------------------------------  Unable to obtain ROS due to patient's clinical condition.     VITALS/PHYSICAL EXAM  --------------------------------------------------------------------------------  T(C): 36.8 (04-04-25 @ 08:00), Max: 37.3 (04-04-25 @ 00:00)  HR: 108 (04-04-25 @ 09:00) (102 - 116)  BP: 93/56 (04-04-25 @ 09:00) (93/56 - 115/76)  RR: 22 (04-04-25 @ 09:00) (20 - 26)  SpO2: 97% (04-04-25 @ 09:00) (94% - 99%)  Wt(kg): --        04-03-25 @ 07:01  -  04-04-25 @ 07:00  --------------------------------------------------------  IN: 1456.8 mL / OUT: 3465 mL / NET: -2008.2 mL    04-04-25 @ 07:01  -  04-04-25 @ 09:24  --------------------------------------------------------  IN: 53.2 mL / OUT: 40 mL / NET: 13.2 mL      PHYSICAL EXAM:  Gen: intubated/sedated  Neuro: sedated  HEENT: +ETT  Pulm: CTA B/L  CV: +S1S2  Abd: soft, non-tender/non-distended  : +fredy  Extremities: +edema  Skin: Warm  Dialysis access: Right femoral non-tunneled cath    LABS/STUDIES  --------------------------------------------------------------------------------              8.1    17.09 >-----------<  411      [04-04-25 @ 00:35]              22.1     136  |  94  |  60  ----------------------------<  107      [04-04-25 @ 00:35]  3.5   |  19  |  3.06        Ca     8.9     [04-04-25 @ 00:35]      iCa    1.11     [04-03 @ 01:35]      Mg     2.10     [04-04-25 @ 00:35]      Phos  6.6     [04-04-25 @ 00:35]    TPro  7.4  /  Alb  2.7  /  TBili  17.1  /  DBili  15.6  /  AST  128  /  ALT  30  /  AlkPhos  259  [04-04-25 @ 00:35]    PT/INR: PT 12.1 , INR 1.04       [04-04-25 @ 00:35]  PTT: 29.3       [04-04-25 @ 00:35]    Uric acid 6.3      [04-04-25 @ 00:35]        [04-04-25 @ 00:35]    Creatinine Trend:  SCr 3.06 [04-04 @ 00:35]  SCr 5.12 [04-03 @ 01:35]  SCr 5.38 [04-02 @ 01:12]  SCr 5.23 [04-01 @ 00:26]  SCr 4.96 [03-31 @ 18:50]    Iron 428, TIBC 541, %sat 79      [10-24-24 @ 23:48]  Ferritin 3189      [10-28-24 @ 06:55]  Lipid: chol --, , HDL --, LDL --      [04-04-25 @ 00:35]    HBsAb 50.9      [03-29-25 @ 07:15]  HBsAg Nonreact      [03-17-25 @ 15:26]  HBcAb Nonreact      [03-29-25 @ 07:15]  HCV 0.10, Nonreact      [03-17-25 @ 15:26]  HIV Nonreact      [03-17-25 @ 15:26]     [facility] DIVISION OF KIDNEY DISEASES AND HYPERTENSION --    Reason for consult: MATA    24 hour events/subjective: Patient seen and examined at bedside in MICU. Had HD yesterday and tolerated well. Remains intubated and sedated.      PAST HISTORY  --------------------------------------------------------------------------------  No significant changes to PMH, PSH, FHx, SHx, unless otherwise noted    ALLERGIES & MEDICATIONS  --------------------------------------------------------------------------------  Allergies    penicillin (Pruritus)  hydroxyurea (Other)  piperacillin-tazobactam (Urticaria)  ceftriaxone (Anaphylaxis)      Standing Inpatient Medications  albuterol/ipratropium for Nebulization 3 milliLiter(s) Nebulizer every 6 hours  allopurinol 50 milliGRAM(s) Oral <User Schedule>  chlorhexidine 0.12% Liquid 15 milliLiter(s) Oral Mucosa every 12 hours  chlorhexidine 2% Cloths 1 Application(s) Topical daily  doxazosin 1 milliGRAM(s) Oral at bedtime  folic acid 1 milliGRAM(s) Oral daily  influenza   Vaccine 0.5 milliLiter(s) IntraMuscular once  levETIRAcetam  Solution 500 milliGRAM(s) Oral two times a day  meropenem  IVPB 500 milliGRAM(s) IV Intermittent every 24 hours  petrolatum Ophthalmic Ointment 1 Application(s) Both EYES two times a day  potassium phosphate / sodium phosphate Powder (PHOS-NaK) 1 Packet(s) Oral once  propofol Infusion 50 MICROgram(s)/kG/Min IV Continuous <Continuous>  sodium bicarbonate 650 milliGRAM(s) Oral three times a day    PRN Inpatient Medications      REVIEW OF SYSTEMS  --------------------------------------------------------------------------------  Unable to obtain ROS due to patient's clinical condition.     VITALS/PHYSICAL EXAM  --------------------------------------------------------------------------------  T(C): 36.8 (04-04-25 @ 08:00), Max: 37.3 (04-04-25 @ 00:00)  HR: 108 (04-04-25 @ 09:00) (102 - 116)  BP: 93/56 (04-04-25 @ 09:00) (93/56 - 115/76)  RR: 22 (04-04-25 @ 09:00) (20 - 26)  SpO2: 97% (04-04-25 @ 09:00) (94% - 99%)  Wt(kg): --        04-03-25 @ 07:01  -  04-04-25 @ 07:00  --------------------------------------------------------  IN: 1456.8 mL / OUT: 3465 mL / NET: -2008.2 mL    04-04-25 @ 07:01  -  04-04-25 @ 09:24  --------------------------------------------------------  IN: 53.2 mL / OUT: 40 mL / NET: 13.2 mL      PHYSICAL EXAM:  Gen: intubated/sedated  Neuro: sedated  HEENT: +ETT  Pulm: CTA B/L  CV: +S1S2  Abd: soft, non-tender/non-distended  : +daniel  Extremities: +edema  Skin: Warm  Dialysis access: Right femoral non-tunneled cath    LABS/STUDIES  --------------------------------------------------------------------------------              8.1    17.09 >-----------<  411      [04-04-25 @ 00:35]              22.1     136  |  94  |  60  ----------------------------<  107      [04-04-25 @ 00:35]  3.5   |  19  |  3.06        Ca     8.9     [04-04-25 @ 00:35]      iCa    1.11     [04-03 @ 01:35]      Mg     2.10     [04-04-25 @ 00:35]      Phos  6.6     [04-04-25 @ 00:35]    TPro  7.4  /  Alb  2.7  /  TBili  17.1  /  DBili  15.6  /  AST  128  /  ALT  30  /  AlkPhos  259  [04-04-25 @ 00:35]    PT/INR: PT 12.1 , INR 1.04       [04-04-25 @ 00:35]  PTT: 29.3       [04-04-25 @ 00:35]    Uric acid 6.3      [04-04-25 @ 00:35]        [04-04-25 @ 00:35]    Creatinine Trend:  SCr 3.06 [04-04 @ 00:35]  SCr 5.12 [04-03 @ 01:35]  SCr 5.38 [04-02 @ 01:12]  SCr 5.23 [04-01 @ 00:26]  SCr 4.96 [03-31 @ 18:50]    Iron 428, TIBC 541, %sat 79      [10-24-24 @ 23:48]  Ferritin 3189      [10-28-24 @ 06:55]  Lipid: chol --, , HDL --, LDL --      [04-04-25 @ 00:35]    HBsAb 50.9      [03-29-25 @ 07:15]  HBsAg Nonreact      [03-17-25 @ 15:26]  HBcAb Nonreact      [03-29-25 @ 07:15]  HCV 0.10, Nonreact      [03-17-25 @ 15:26]  HIV Nonreact      [03-17-25 @ 15:26]

## 2025-04-04 NOTE — PROGRESS NOTE ADULT - SUBJECTIVE AND OBJECTIVE BOX
Interval Events:  Air leak overnight from cuff being overinflated. Discussed with brother and HCP Bala who is amenable to proceed with tracheostomy.    REVIEW OF SYSTEMS:  Negative except as documented above.      OBJECTIVE:  ICU Vital Signs Last 24 Hrs  T(C): 36.5 (04 Apr 2025 12:00), Max: 37.3 (04 Apr 2025 00:00)  T(F): 97.7 (04 Apr 2025 12:00), Max: 99.1 (04 Apr 2025 00:00)  HR: 94 (04 Apr 2025 15:27) (94 - 116)  BP: 113/79 (04 Apr 2025 12:17) (93/56 - 125/80)  BP(mean): 91 (04 Apr 2025 12:17) (66 - 92)  ABP: --  ABP(mean): --  RR: 19 (04 Apr 2025 12:17) (19 - 25)  SpO2: 97% (04 Apr 2025 15:27) (95% - 99%)    O2 Parameters below as of 04 Apr 2025 15:27  Patient On (Oxygen Delivery Method): ventilator          Mode: AC/ CMV (Assist Control/ Continuous Mandatory Ventilation), RR (machine): 20, TV (machine): 460, FiO2: 40, PEEP: 10, ITime: 0.6, MAP: 13, PIP: 23    04-03 @ 07:01 - 04-04 @ 07:00  --------------------------------------------------------  IN: 1456.8 mL / OUT: 3465 mL / NET: -2008.2 mL    04-04 @ 07:01  -  04-04 @ 15:39  --------------------------------------------------------  IN: 53.2 mL / OUT: 40 mL / NET: 13.2 mL      CAPILLARY BLOOD GLUCOSE          PHYSICAL EXAM:  General: intubated  HEENT: EOMI, sclera icteric  Neck: supple  Cardiovascular: RR  Respiratory: intubated on ventilator  Abdomen: soft  Extremities: warm and well perfused, bilateral LE edema  Skin: small lesion/healed wound on left ear  Neurological: AAOx0      [facility] MEDICATIONS:  MEDICATIONS  (STANDING):  albuterol/ipratropium for Nebulization 3 milliLiter(s) Nebulizer every 6 hours  allopurinol 50 milliGRAM(s) Oral <User Schedule>  chlorhexidine 0.12% Liquid 15 milliLiter(s) Oral Mucosa every 12 hours  chlorhexidine 2% Cloths 1 Application(s) Topical daily  desmopressin IVPB 35 MICROGram(s) IV Intermittent once  doxazosin 1 milliGRAM(s) Oral at bedtime  folic acid 1 milliGRAM(s) Oral daily  influenza   Vaccine 0.5 milliLiter(s) IntraMuscular once  levETIRAcetam  Solution 500 milliGRAM(s) Oral two times a day  lidocaine 1%/epinephrine 1:100,000 Inj 20 milliLiter(s) Local Injection once  meropenem  IVPB 500 milliGRAM(s) IV Intermittent every 24 hours  petrolatum Ophthalmic Ointment 1 Application(s) Both EYES two times a day  propofol Infusion 50 MICROgram(s)/kG/Min (26.6 mL/Hr) IV Continuous <Continuous>  sodium bicarbonate 650 milliGRAM(s) Oral three times a day    MEDICATIONS  (PRN):      LABS:                        8.1    17.09 )-----------( 411      ( 04 Apr 2025 00:35 )             22.1     Hgb Trend: 8.1<--, 7.8<--, 7.0<--, 6.7<--, 7.2<--  04-04    136  |  94[L]  |  60[H]  ----------------------------<  107[H]  3.5   |  19[L]  |  3.06[H]    Ca    8.9      04 Apr 2025 00:35  Phos  6.6     04-04  Mg     2.10     04-04    TPro  7.4  /  Alb  2.7[L]  /  TBili  17.1[H]  /  DBili  15.6[H]  /  AST  128[H]  /  ALT  30  /  AlkPhos  259[H]  04-04    Creatinine Trend: 3.06<--, 5.12<--, 5.38<--, 5.23<--, 4.96<--, 4.80<--  PT/INR - ( 04 Apr 2025 00:35 )   PT: 12.1 sec;   INR: 1.04 ratio         PTT - ( 04 Apr 2025 00:35 )  PTT:29.3 sec  Urinalysis Basic - ( 04 Apr 2025 00:35 )    Color: x / Appearance: x / SG: x / pH: x  Gluc: 107 mg/dL / Ketone: x  / Bili: x / Urobili: x   Blood: x / Protein: x / Nitrite: x   Leuk Esterase: x / RBC: x / WBC x   Sq Epi: x / Non Sq Epi: x / Bacteria: x        Venous Blood Gas:  04-04 @ 00:35  7.38/43/47/25/72.6  VBG Lactate: 1.3  Venous Blood Gas:  04-03 @ 01:35  7.35/32/160/18/99.0  VBG Lactate: 1.0      MICROBIOLOGY:     Culture - Fungal, Bronchial (collected 02 Apr 2025 16:38)  Source: Bronchial Bronchial Lavage  Preliminary Report (04 Apr 2025 09:21):    Few Yeast    Culture - Acid Fast - Bronchial w/Smear (collected 02 Apr 2025 16:38)  Source: Bronchial Bronchial Lavage    Culture - Bronchial (collected 02 Apr 2025 16:38)  Source: Bronchial Bronchial Lavage  Gram Stain (03 Apr 2025 00:13):    No polymorphonuclear cells seen per low power field    No squamous epithelial cells per low power field    Few Yeast per oil power field  Final Report (04 Apr 2025 08:14):    Commensal lay consistent with body site        RADIOLOGY:  [x] Reviewed and interpreted by me

## 2025-04-04 NOTE — CHART NOTE - NSCHARTNOTEFT_GEN_A_CORE
: Jaison Morataya    INDICATION: tracheostomy evaluation    PROCEDURE:  [ ] LIMITED ECHO  [ ] LIMITED CHEST  [ ] LIMITED RETROPERITONEAL  [ ] LIMITED ABDOMINAL  [ ] LIMITED DVT  [ ] NEEDLE GUIDANCE VASCULAR  [ ] NEEDLE GUIDANCE THORACENTESIS  [ ] NEEDLE GUIDANCE PARACENTESIS  [ ] NEEDLE GUIDANCE PERICARDIOCENTESIS  [x] OTHER: NECK    FINDINGS:  1st tracheal ring with overlying small vessel which is easily compressible and non-pulsatile and without overlying innominate vessel.    INTERPRETATION:  1st tracheal ring with overlying small vessel which is easily compressible and non-pulsatile and without overlying innominate vessel. Safe area for tracheostomy placement.    Images uploaded on [employer] : Jaison Morataya    INDICATION: tracheostomy evaluation    PROCEDURE:  [ ] LIMITED ECHO  [ ] LIMITED CHEST  [ ] LIMITED RETROPERITONEAL  [ ] LIMITED ABDOMINAL  [ ] LIMITED DVT  [ ] NEEDLE GUIDANCE VASCULAR  [ ] NEEDLE GUIDANCE THORACENTESIS  [ ] NEEDLE GUIDANCE PARACENTESIS  [ ] NEEDLE GUIDANCE PERICARDIOCENTESIS  [x] OTHER: NECK    FINDINGS:  1st tracheal ring with overlying small vessel which is easily compressible and non-pulsatile and without overlying innominate vessel.    INTERPRETATION:  1st tracheal ring with overlying small vessel which is easily compressible and non-pulsatile and without overlying innominate vessel. Safe area for tracheostomy placement.    Images uploaded on [employer]    Agree with above.     DAVID Webb MD

## 2025-04-04 NOTE — PROGRESS NOTE ADULT - ASSESSMENT
This is a 44 year old male with sickle cell disease who presents with low hemoglobin.  s/p left renal bx with stent placement 3/19    1. Sickle cell anemia  -- Baseline hemoglobin outpatient ~6.0, sent in for hemoglobin 5.3   -- Labs consistent with hemolysis- further worsened bili/LFTs due to shock liver   -- CXR neg. CTA chest w/o PE though RUL opacity.   -- Continue with folic acid, IV fluids as needed   -- Monitor CBC and transfuse to maintain hg >6. s/p multiple units of blood this admission prior to OR and after OR due to bleed     2. R IJ DVT   -- AC discontinued as pt with bleed    3. Urothelial carcinoma    -- Prior imaging with concern for urothelial malignancy   -- s/p Lureteroscopy, RGP, biopsy, stent placement L kidney wash, L kidney lower pole mass biopsy 3/19, path shows urothelial carcinoma   -- If pt able to make a recovery, can discuss path and treatment options outpatient, though unlikely    4. Hemorrhagic shock   -- Post-op hemoglobin dropped to 3.4  -- CT showed moderate size left perinephric hematoma tracking into the pelvis. Likely associated small amount of left subcapsular hemorrhage  -- Currently pt in kidney failure on HD, with shock liver and global anoxic brain injury   -- Intubated, on pressors   -- Appreciate care per ICU   -- Palliative care team following, GOC discussions ongoing as pt has not made any meaningful recovery yet       GOC  --Full code   --s/p bronch  --plan for Trach and PEG    Will continue to follow.    Meghana Real NP  Hematology/Oncology  [facility]  [phone] (Office)  [phone] (Alt office)  Evenings and weekends please call MD on call or office

## 2025-04-04 NOTE — PROGRESS NOTE ADULT - ASSESSMENT
44 year old male with history of sickle cell disease, UE DVT, gout, HTN, with L renal mass admitted with acute anemia/ sickle cell crisis and evaluation of his left renal mass s/p left ureteroscopy and biopsy (3/19/25) with post operative course complicated by rapid responses for hypoglycemia c/b cardiac arrest x 4 minutes CPR/1 epi with ROSC, transferred to MICU for further care, found to have anoxic brain injury. Interventional pulmonology consulted for evaluation for tracheostomy.    Intubated 3/20/25 for cardiac arrest. Bedside POCUS evaluation revealed 1st tracheal ring with overlying small vessel which is easily compressible and non-pulsatile and without overlying innominate vessel.    Discussed tracheostomy with brother/HCP Bala, risks and benefits explained and written consent obtained.    4/4/25 s/p percutaneous tracheostomy 7 portex placed today with minimal bleeding. Tolerated procedure without issue    #Cardiac arrest  #Tracheostomy evaluation    Recommendations:  - obtain post-trach placement chest xray  - monitor for bleeding/oozing  - can soak gauze with TXA and compress areas of oozing    Recommendations are not final pending attending attestation. 44 year old male with history of sickle cell disease, UE DVT, gout, HTN, with L renal mass admitted with acute anemia/ sickle cell crisis and evaluation of his left renal mass s/p left ureteroscopy and biopsy (3/19/25) with post operative course complicated by rapid responses for hypoglycemia c/b cardiac arrest x 4 minutes CPR/1 epi with ROSC, transferred to MICU for further care, found to have anoxic brain injury. Interventional pulmonology consulted for evaluation for tracheostomy.    Intubated 3/20/25 for cardiac arrest. Bedside POCUS evaluation revealed 1st tracheal ring with overlying small vessel which is easily compressible and non-pulsatile and without overlying innominate vessel.    Discussed tracheostomy with brother/HCP Bala, risks and benefits explained and written consent obtained.    4/4/25 s/p percutaneous tracheostomy 7 portex placed today with minimal bleeding. Tolerated procedure without issue    #Cardiac arrest  #Tracheostomy evaluation    Recommendations:  - obtain post-trach placement chest xray  - monitor for bleeding/oozing  - can soak gauze with TXA and compress areas of oozing    INTERVENTIONAL PULMONOLOGY POST-TRACHEOSTOMY NOTE      Patient underwent uneventful percutaneous tracheostomy with size 7 portex. See procedure notes for further details.       Post-tracheostomy care instructions:  -Minimize tension on tracheostomy: Keep tracheostomy elevated on towels to maintain perpendicular to neck and keep enough slack on vent tubing to avoid tension  -Sedate/Restrain patient to ensure does not pull at tracheostomy  -Keep tracheostomy retention collar in place at all times  -Utilize tracheostomy specific in-line suction or red rubber suction tubing through the swivel valve  -First dressing change at 72 hours. If dressings are grossly saturated before that time, reenforce dressings and page pulmonary/critical care fellow  - The sutures are to remain in place for 10 days, please do not remove before then. Sutures may be removed by the primary service. If feel there are issues with the sutures, please contact MICU/Pulmonary fellow  -First tracheostomy change to happen in 3 weeks. If patient still admitted at that time please page pulmonary service to arrange exchange      -If issues with bleeding call pulmonary/critical care fellow on call  -If tracheostomy becomes dislodged prior to initial tracheostomy exchange do NOT attempt to replace the tracheostomy. Call MICU or Anesthesia immediately to intubate the patient orally with an ETT and page pulmonary/critical care fellow on call    Jaison Webb MD.   Interventional Pulmonology.

## 2025-04-04 NOTE — PROGRESS NOTE ADULT - ATTENDING COMMENTS
Agree with above-mentioned assessment and plan.  Patient chronic hypoxemic respiratory failure the difficulty of weaning of the ventilator and requiring mechanical ventilation dependence.  Critically ill.  Interventional pulmonary consult was requested for percutaneous tube was placed at bedside today.  Continue tracheostomy care as described above.  If significant bleeding noted please let IP team know IP team to follow.   Continue to correct coagulopathy as well as uremia and platelet and hemoglobin.

## 2025-04-04 NOTE — PROCEDURE NOTE - NSBRONCHPROCDETAILS_GEN_A_CORE_FT
Bronchoscope inserted through ETT to level of kennedy.  Therapeutic aspiration of secretions from both bronchial trees.  BAL done of RUL with 40 cc of NS.  Bronchoscope removed from ETT. 
Procedure in detail: Informed consent was obtained from the family after risks, benefits and alternatives were described in detail prior to the procedure. Using the feedback technique, we determined their appropriate understanding of the indication for the procedure, expected results, and potential associated complications.      With the patient in a supine position, the patient had an indwelling 7.5 cm endotracheal tube (ETT). A bite block was secured in place. A swivel airway adaptor was connected to the ETT through which bronchoscopy was performed for guiding the tracheostomy procedure. The tip of the ETT was seen at  4 cm above the main kennedy. The lower trachea, mainstem bronchi and distal lobar and segmental bronchi looked normal except extensive mucoid secretions. Then, after mouth suctioning using a Yankauer catheter, we deflated the ETT cuff and pulled back the ETT in the subglottis under direct bronchoscopic visualization. The upper tracheal mucosa showed inflammation and erythema. We secured the ETT and selected the site for the percutaneous tracheostomy in between the 1st and the 2nd tracheal rings. All instruments and accessories used for tracheostomy including the finder needle, the large needle, the guide wire, the short dilator, stiffening catheter, the cone dilator and eventually the tracheostomy tube were visualized entering the trachea in the correct location. The inner cannula was inserted, the cuff was inflated and ventilation was switched from the endotracheal tube to the newly placed tracheostomy tube. A swivel adaptor was placed over the tracheostomy tube, and bronchoscopy was performed to assure that the airways were clean and to document the position of the tip of the tube. There was no evidence of bleeding and the tip of the tube was at 4 cm away from the main kennedy. We then removed the bronchoscope form the tracheostomy tube and reinserted it in the ETT. The cuff of the ETT was deflated and the ETT was removed under direct bronchoscopic visualization. It was noted that the stoma was at 5 cm away from the vocal cords. There was evidence of mucosal erythema, edema in the subglottis and vocal cords edema. Therapeutic aspiration of secretions was performed.The ETT was then completely removed and procedure was terminated. There was no evidence of bleeding, pneumothorax or hemodynamic instability.

## 2025-04-04 NOTE — CONSULT NOTE ADULT - SUBJECTIVE AND OBJECTIVE BOX
HPI:  TOLU VARGAS is a 44y Male with PMHx of sickle cell disease admitted for anemia concerning for sickle cell crisis s/p ureteroscopy w/ L kidney biopsy 3/19, post-operative course c/b acute hemorrhagic shock, PEA arrest with ROSC; currently with acute renal failure on HD and global anoxic brain injury and urothelial cancer; s/p tracheostomy.     GI was consulted for PEG evaluation.   Unable to obtain hx from patient. Discussed with MICU team.       PMHX/PSHX:    Sickle cell anemia    Gout    Anemia requiring transfusions    Marijuana abuse    Pneumonia    Deep vein thrombosis (DVT)    Iron overload    Hypertension    History of cholecystectomy    History of removal of Port-a-Cath      Allergies:  penicillin (Pruritus)  hydroxyurea (Other)  piperacillin-tazobactam (Urticaria)  ceftriaxone (Anaphylaxis)      Home Medications: reviewed  Hospital Medications:  albuterol/ipratropium for Nebulization 3 milliLiter(s) Nebulizer every 6 hours  allopurinol 50 milliGRAM(s) Oral <User Schedule>  chlorhexidine 0.12% Liquid 15 milliLiter(s) Oral Mucosa every 12 hours  chlorhexidine 2% Cloths 1 Application(s) Topical daily  desmopressin IVPB 35 MICROGram(s) IV Intermittent once  doxazosin 1 milliGRAM(s) Oral at bedtime  folic acid 1 milliGRAM(s) Oral daily  influenza   Vaccine 0.5 milliLiter(s) IntraMuscular once  levETIRAcetam  Solution 500 milliGRAM(s) Oral two times a day  lidocaine 1%/epinephrine 1:100,000 Inj 20 milliLiter(s) Local Injection once  meropenem  IVPB 500 milliGRAM(s) IV Intermittent every 24 hours  petrolatum Ophthalmic Ointment 1 Application(s) Both EYES two times a day  propofol Infusion 50 MICROgram(s)/kG/Min IV Continuous <Continuous>  sodium bicarbonate 650 milliGRAM(s) Oral three times a day      PHYSICAL EXAM:   Vital Signs:  Vital Signs Last 24 Hrs  T(C): 36.5 (2025 12:00), Max: 37.3 (2025 00:00)  T(F): 97.7 (2025 12:00), Max: 99.1 (2025 00:00)  HR: 98 (2025 16:00) (91 - 116)  BP: 118/64 (2025 16:00) (93/56 - 125/80)  BP(mean): 80 (2025 16:00) (66 - 92)  RR: 21 (2025 16:00) (19 - 25)  SpO2: 97% (2025 16:00) (95% - 100%)    Parameters below as of 2025 15:27  Patient On (Oxygen Delivery Method): ventilator      Daily     Daily Weight in k.8 (2025 04:00)    GENERAL: No acute distress  NEURO: No responsive  HEENT: (+) icteric sclera  CHEST: (+) trach, no distress on vent  CARDIAC: regular rate, +S1/S2  ABDOMEN: Soft, hepatomegaly on exam with liver edge extending into mid-abdomen  EXTREMITIES: warm, well perfused  SKIN: Jaundice    LABS: reviewed                        8.1    17.09 )-----------( 411      ( 2025 00:35 )             22.1     04-04    136  |  94[L]  |  60[H]  ----------------------------<  107[H]  3.5   |  19[L]  |  3.06[H]    Ca    8.9      2025 00:35  Phos  6.6     04-04  Mg     2.10     04-04    TPro  7.4  /  Alb  2.7[L]  /  TBili  17.1[H]  /  DBili  15.6[H]  /  AST  128[H]  /  ALT  30  /  AlkPhos  259[H]  04-04    LIVER FUNCTIONS - ( 2025 00:35 )  Alb: 2.7 g/dL / Pro: 7.4 g/dL / ALK PHOS: 259 U/L / ALT: 30 U/L / AST: 128 U/L / GGT: x             Culture - Fungal, Bronchial (collected 2025 16:38)  Source: Bronchial Bronchial Lavage  Preliminary Report (2025 09:21):    Few Yeast    Culture - Acid Fast - Bronchial w/Smear (collected 2025 16:38)  Source: Bronchial Bronchial Lavage    Culture - Bronchial (collected 2025 16:38)  Source: Bronchial Bronchial Lavage  Gram Stain (2025 00:13):    No polymorphonuclear cells seen per low power field    No squamous epithelial cells per low power field    Few Yeast per oil power field  Final Report (2025 08:14):    Commensal lay consistent with body site    < from: CT Abdomen and Pelvis w/wo IV Cont (25 @ 13:58) >  FINDINGS:    LOWER CHEST: Cardiomegaly. Small pericardial effusion. Vascular   congestion. Bilateral airspace opacities, left greater than right. Trace   left pleural effusion.    LIVER: Cirrhosis.  BILE DUCTS: Normal caliber.  GALLBLADDER: Cholecystectomy.  SPLEEN: Atrophic, calcified.  PANCREAS: Within normal limits.  ADRENALS: Within normal limits.  KIDNEYS/URETERS: Arterial phase images are suboptimal. Moderate amount of   hemorrhage in the left perinephric space with small amount tracking   inferiorly into the extraperitoneal space. Small amount of left   subcapsular hemorrhage. No obvious active extravasation. Right duplicated   collecting system. Subcentimeter hypodensity in the right kidney midpole   too small to characterize. Left ureteral stent terminating in the   bladder. Mild left hydronephrosis. Right kidney is within normal limits.    BLADDER: Decompressed and cannot be evaluated.  REPRODUCTIVE ORGANS: There is a urethral catheter terminating in the   penile urethra. Prostate within normal limits.    BOWEL: No bowel obstruction. Appendix is normal. Rectal temperature probe   in place. Small hiatal hernia.  PERITONEUM/RETROPERITONEUM: Small volume ascites.  VESSELS: Atherosclerotic changes. 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 Left femoral artery catheter terminating in the left external   iliac artery. Left femoral vein catheter terminates within the left   external iliac vein.  LYMPH NODES: Upper abdominal lymphadenopathy and slightly enlarged   retroperitoneal lymph nodes.  ABDOMINAL WALL: Small fat-containing left inguinal hernia.  BONES: Degenerative changes. Stable compression deformities and multiple   bilateral rib fractures.    IMPRESSION:  Moderate size left perinephric hematoma tracking into the pelvis. Likely   associated small amount of left subcapsular hemorrhage. No obvious active   extravasation of contrast.  Left nephroureteral stent in place. Mild left hydronephrosis.  Urethral catheter terminating in the penile urethra. Repositioning is   advised.    --- End of Report ---    < end of copied text >

## 2025-04-04 NOTE — CHART NOTE - NSCHARTNOTEFT_GEN_A_CORE
: Martha Cervantes     INDICATION: AHRF     PROCEDURE:   [ x] LIMITED ECHO  [ x] LIMITED CHEST  [ x] LIMITED ABDOMINAL      FINDINGS:   Bilateral A line predominant  Bilateral lung sliding  Bilateral pleural effusion   Consolidation on the Rt side   Small ascites increased in size from yesterday's exam  Grossly normal LV systolic function without focal wall motion abnormalities  LVOT VTI  16      Supervised by Dr. Bartholomew   Should not be considered final until signed by attending. : Martha Cervantes     INDICATION: AHRF     PROCEDURE:   [ x] LIMITED ECHO  [ x] LIMITED CHEST  [ x] LIMITED ABDOMINAL      FINDINGS:   Bilateral A line predominant  Bilateral lung sliding  Bilateral pleural effusion   Consolidation on the Rt side   Small ascites increased in size from yesterday's exam  Grossly normal LV systolic function without focal wall motion abnormalities  LVOT VTI  16      Supervised by Dr. Bartholomew   Should not be considered final until signed by attending.    Attending Attestation:  I was present during the key portions of the procedure and immediately available during the entire procedure.  Zaid Bartholomew MD  Attending  Pulmonary & Critical Care Medicine

## 2025-04-04 NOTE — PROGRESS NOTE ADULT - ATTENDING COMMENTS
MATA  ATN  Pigment Nephropathy    Pt seen on HD yesterday, tolerated it well   Will reassess for HD in AM as he may need it ongoing for clearance  Monitor labs and Is/Os

## 2025-04-04 NOTE — PROGRESS NOTE ADULT - SUBJECTIVE AND OBJECTIVE BOX
MICU PROGRESS NOTE     HISTORY OF PRESENT ILLNESS     Overnight, had air leak due to cuff being overinflated but was not exchanged. Had HD yesterday with 1.8 L off. Discussed with Bala and proceeding with tracheostomy. Afebrile HR 100s-110s. SBPs 90s-100s. MV V A/C  mL PEEP 10, RR 20, FiO2 40. Triggering.     Seen and examined at bedside, patient remains unable to engage meaningfully.     ROS: All negative except as listed above.    VITAL SIGNS:  ICU Vital Signs Last 24 Hrs  T(C): 37.1 (04 Apr 2025 04:00), Max: 37.3 (04 Apr 2025 00:00)  T(F): 98.8 (04 Apr 2025 04:00), Max: 99.1 (04 Apr 2025 00:00)  HR: 114 (04 Apr 2025 07:21) (101 - 116)  BP: 108/64 (04 Apr 2025 07:00) (95/59 - 115/76)  BP(mean): 75 (04 Apr 2025 07:00) (69 - 89)  ABP: --  ABP(mean): --  RR: 23 (04 Apr 2025 07:00) (20 - 26)  SpO2: 99% (04 Apr 2025 07:21) (94% - 99%)    O2 Parameters below as of 04 Apr 2025 07:00  Patient On (Oxygen Delivery Method): ventilator    O2 Concentration (%): 40      Mode: AC/ CMV (Assist Control/ Continuous Mandatory Ventilation), RR (machine): 20, TV (machine): 460, FiO2: 40, PEEP: 10, ITime: 0.6, MAP: 15, PIP: 34  Plateau pressure:   P/F ratio:     04-03 @ 07:01  -  04-04 @ 07:00  --------------------------------------------------------  IN: 1456.8 mL / OUT: 3465 mL / NET: -2008.2 mL      CAPILLARY BLOOD GLUCOSE          ECG: reviewed.    PHYSICAL EXAM:    General:   Neuro:   HEENT:  Chest:   Heart:   Lungs:   Abd:   Ext:   Back:   Pulses:   Lines/tubes/drains:   Psych:     MEDICATIONS:  MEDICATIONS  (STANDING):  albuterol/ipratropium for Nebulization 3 milliLiter(s) Nebulizer every 6 hours  allopurinol 50 milliGRAM(s) Oral <User Schedule>  chlorhexidine 0.12% Liquid 15 milliLiter(s) Oral Mucosa every 12 hours  chlorhexidine 2% Cloths 1 Application(s) Topical daily  doxazosin 1 milliGRAM(s) Oral at bedtime  folic acid 1 milliGRAM(s) Oral daily  influenza   Vaccine 0.5 milliLiter(s) IntraMuscular once  levETIRAcetam  Solution 500 milliGRAM(s) Oral two times a day  meropenem  IVPB 500 milliGRAM(s) IV Intermittent every 24 hours  petrolatum Ophthalmic Ointment 1 Application(s) Both EYES two times a day  potassium phosphate / sodium phosphate Powder (PHOS-NaK) 1 Packet(s) Oral once  propofol Infusion 50 MICROgram(s)/kG/Min (26.6 mL/Hr) IV Continuous <Continuous>  sodium bicarbonate 650 milliGRAM(s) Oral three times a day    MEDICATIONS  (PRN):      ALLERGIES:  Allergies    penicillin (Pruritus)  hydroxyurea (Other)  piperacillin-tazobactam (Urticaria)  ceftriaxone (Anaphylaxis)    Intolerances        LABS:                        8.1    17.09 )-----------( 411      ( 04 Apr 2025 00:35 )             22.1     04-04    136  |  94[L]  |  60[H]  ----------------------------<  107[H]  3.5   |  19[L]  |  3.06[H]    Ca    8.9      04 Apr 2025 00:35  Phos  6.6     04-04  Mg     2.10     04-04    TPro  7.4  /  Alb  2.7[L]  /  TBili  17.1[H]  /  DBili  15.6[H]  /  AST  128[H]  /  ALT  30  /  AlkPhos  259[H]  04-04    PT/INR - ( 04 Apr 2025 00:35 )   PT: 12.1 sec;   INR: 1.04 ratio         PTT - ( 04 Apr 2025 00:35 )  PTT:29.3 sec  Urinalysis Basic - ( 04 Apr 2025 00:35 )    Color: x / Appearance: x / SG: x / pH: x  Gluc: 107 mg/dL / Ketone: x  / Bili: x / Urobili: x   Blood: x / Protein: x / Nitrite: x   Leuk Esterase: x / RBC: x / WBC x   Sq Epi: x / Non Sq Epi: x / Bacteria: x      ABG:      vBG:  pH, Venous: 7.38 (04-04-25 @ 00:35)  pCO2, Venous: 43 mmHg (04-04-25 @ 00:35)  pO2, Venous: 47 mmHg (04-04-25 @ 00:35)  HCO3, Venous: 25 mmol/L (04-04-25 @ 00:35)    Micro:    Culture - Blood (collected 03-20-25 @ 10:04)  Source: Blood Blood-Peripheral  Final Report (03-25-25 @ 13:01):    No growth at 5 days        Culture - Sputum (collected 03-26-25 @ 11:45)  Source: ET Tube ET Tube  Gram Stain (03-26-25 @ 22:28):    Few Squamous epithelial cells per low power field    Moderate polymorphonuclear leukocytes per low power field    Few Yeast per oil power field  Final Report (03-28-25 @ 08:15):    Commensal lay consistent with body site    Culture - Sputum (collected 03-22-25 @ 17:15)  Source: ET Tube ET Tube  Gram Stain (03-22-25 @ 23:42):    Numerous polymorphonuclear leukocytes per low power field    Rare Squamous epithelial cells per low power field    No organisms seen per oil power field  Final Report (03-24-25 @ 06:52):    Commensal lay consistent with body site        RADIOLOGY & ADDITIONAL TESTS: Reviewed. MICU PROGRESS NOTE     HISTORY OF PRESENT ILLNESS     Overnight, had air leak due to cuff being overinflated but was not exchanged. Had HD yesterday with 1.8 L off. Discussed with Bala and proceeding with tracheostomy. Afebrile HR 100s-110s. SBPs 90s-100s. MV V A/C  mL PEEP 10, RR 20, FiO2 40. Triggering.     Seen and examined at bedside, patient remains unable to engage meaningfully.     ROS: All negative except as listed above.    VITAL SIGNS:  ICU Vital Signs Last 24 Hrs  T(C): 37.1 (04 Apr 2025 04:00), Max: 37.3 (04 Apr 2025 00:00)  T(F): 98.8 (04 Apr 2025 04:00), Max: 99.1 (04 Apr 2025 00:00)  HR: 114 (04 Apr 2025 07:21) (101 - 116)  BP: 108/64 (04 Apr 2025 07:00) (95/59 - 115/76)  BP(mean): 75 (04 Apr 2025 07:00) (69 - 89)  ABP: --  ABP(mean): --  RR: 23 (04 Apr 2025 07:00) (20 - 26)  SpO2: 99% (04 Apr 2025 07:21) (94% - 99%)    O2 Parameters below as of 04 Apr 2025 07:00  Patient On (Oxygen Delivery Method): ventilator    O2 Concentration (%): 40      Mode: AC/ CMV (Assist Control/ Continuous Mandatory Ventilation), RR (machine): 20, TV (machine): 460, FiO2: 40, PEEP: 10, ITime: 0.6, MAP: 15, PIP: 34  Plateau pressure:   P/F ratio:     04-03 @ 07:01  -  04-04 @ 07:00  --------------------------------------------------------  IN: 1456.8 mL / OUT: 3465 mL / NET: -2008.2 mL      CAPILLARY BLOOD GLUCOSE          ECG: reviewed.    PHYSICAL EXAM:    General: NAD, normal habitus, mild jaundice   Neuro: AAOx0,sedated non responsive to noxious stimuli, closed eyes, no verbal sounds, 4 mm pupils, sluggishly reactive pupils b/l   HEENT: icteric sclerae b/l , increased watery discharge from eyes   Chest: nonTTP   Heart: S1/S2, RRR   Lungs: CTA b/l, on MV   Abd: soft, nonTTP, nondistended   Ext: generalized trace pitting edema, LUE wrapped, nodule on R 1st digit proximal phalanges   Pulses: radial 2+ b/l, dorsalis pedis 2+ b/l   Lines/tubes/drains: A line L femoral, OG tube, flexicel   Psych: unable to assess     MEDICATIONS  (STANDING):  albuterol/ipratropium for Nebulization 3 milliLiter(s) Nebulizer every 6 hours  allopurinol 50 milliGRAM(s) Oral <User Schedule>  chlorhexidine 0.12% Liquid 15 milliLiter(s) Oral Mucosa every 12 hours  chlorhexidine 2% Cloths 1 Application(s) Topical daily  doxazosin 1 milliGRAM(s) Oral at bedtime  folic acid 1 milliGRAM(s) Oral daily  influenza   Vaccine 0.5 milliLiter(s) IntraMuscular once  levETIRAcetam  Solution 500 milliGRAM(s) Oral two times a day  meropenem  IVPB 500 milliGRAM(s) IV Intermittent every 24 hours  petrolatum Ophthalmic Ointment 1 Application(s) Both EYES two times a day  potassium phosphate / sodium phosphate Powder (PHOS-NaK) 1 Packet(s) Oral once  propofol Infusion 50 MICROgram(s)/kG/Min (26.6 mL/Hr) IV Continuous <Continuous>  sodium bicarbonate 650 milliGRAM(s) Oral three times a day    MEDICATIONS  (PRN):      ALLERGIES:  Allergies    penicillin (Pruritus)  hydroxyurea (Other)  piperacillin-tazobactam (Urticaria)  ceftriaxone (Anaphylaxis)    Intolerances        LABS:                        8.1    17.09 )-----------( 411      ( 04 Apr 2025 00:35 )             22.1     04-04    136  |  94[L]  |  60[H]  ----------------------------<  107[H]  3.5   |  19[L]  |  3.06[H]    Ca    8.9      04 Apr 2025 00:35  Phos  6.6     04-04  Mg     2.10     04-04    TPro  7.4  /  Alb  2.7[L]  /  TBili  17.1[H]  /  DBili  15.6[H]  /  AST  128[H]  /  ALT  30  /  AlkPhos  259[H]  04-04    PT/INR - ( 04 Apr 2025 00:35 )   PT: 12.1 sec;   INR: 1.04 ratio         PTT - ( 04 Apr 2025 00:35 )  PTT:29.3 sec  Urinalysis Basic - ( 04 Apr 2025 00:35 )    Color: x / Appearance: x / SG: x / pH: x  Gluc: 107 mg/dL / Ketone: x  / Bili: x / Urobili: x   Blood: x / Protein: x / Nitrite: x   Leuk Esterase: x / RBC: x / WBC x   Sq Epi: x / Non Sq Epi: x / Bacteria: x      ABG:      vBG:  pH, Venous: 7.38 (04-04-25 @ 00:35)  pCO2, Venous: 43 mmHg (04-04-25 @ 00:35)  pO2, Venous: 47 mmHg (04-04-25 @ 00:35)  HCO3, Venous: 25 mmol/L (04-04-25 @ 00:35)    Micro:    Culture - Blood (collected 03-20-25 @ 10:04)  Source: Blood Blood-Peripheral  Final Report (03-25-25 @ 13:01):    No growth at 5 days        Culture - Sputum (collected 03-26-25 @ 11:45)  Source: ET Tube ET Tube  Gram Stain (03-26-25 @ 22:28):    Few Squamous epithelial cells per low power field    Moderate polymorphonuclear leukocytes per low power field    Few Yeast per oil power field  Final Report (03-28-25 @ 08:15):    Commensal lay consistent with body site    Culture - Sputum (collected 03-22-25 @ 17:15)  Source: ET Tube ET Tube  Gram Stain (03-22-25 @ 23:42):    Numerous polymorphonuclear leukocytes per low power field    Rare Squamous epithelial cells per low power field    No organisms seen per oil power field  Final Report (03-24-25 @ 06:52):    Commensal lay consistent with body site        RADIOLOGY & ADDITIONAL TESTS: Reviewed.

## 2025-04-05 LAB
ALBUMIN SERPL ELPH-MCNC: 2.6 G/DL — LOW (ref 3.3–5)
ALP SERPL-CCNC: 250 U/L — HIGH (ref 40–120)
ALT FLD-CCNC: 29 U/L — SIGNIFICANT CHANGE UP (ref 4–41)
ANION GAP SERPL CALC-SCNC: 22 MMOL/L — HIGH (ref 7–14)
APTT BLD: 23.3 SEC — LOW (ref 24.5–35.6)
AST SERPL-CCNC: 116 U/L — HIGH (ref 4–40)
BASOPHILS # BLD AUTO: 0.11 K/UL — SIGNIFICANT CHANGE UP (ref 0–0.2)
BASOPHILS # BLD AUTO: 0.12 K/UL — SIGNIFICANT CHANGE UP (ref 0–0.2)
BASOPHILS NFR BLD AUTO: 0.6 % — SIGNIFICANT CHANGE UP (ref 0–2)
BASOPHILS NFR BLD AUTO: 0.7 % — SIGNIFICANT CHANGE UP (ref 0–2)
BILIRUB DIRECT SERPL-MCNC: 14.3 MG/DL — HIGH (ref 0–0.3)
BILIRUB INDIRECT FLD-MCNC: 1.5 MG/DL — HIGH (ref 0–1)
BILIRUB SERPL-MCNC: 15.8 MG/DL — HIGH (ref 0.2–1.2)
BILIRUB SERPL-MCNC: 15.8 MG/DL — HIGH (ref 0.2–1.2)
BLD GP AB SCN SERPL QL: NEGATIVE — SIGNIFICANT CHANGE UP
BUN SERPL-MCNC: 74 MG/DL — HIGH (ref 7–23)
CA-I BLD-SCNC: 1.16 MMOL/L — SIGNIFICANT CHANGE UP (ref 1.15–1.29)
CALCIUM SERPL-MCNC: 9 MG/DL — SIGNIFICANT CHANGE UP (ref 8.4–10.5)
CHLORIDE SERPL-SCNC: 93 MMOL/L — LOW (ref 98–107)
CK SERPL-CCNC: 30 U/L — SIGNIFICANT CHANGE UP (ref 30–200)
CO2 SERPL-SCNC: 18 MMOL/L — LOW (ref 22–31)
CREAT SERPL-MCNC: 3.54 MG/DL — HIGH (ref 0.5–1.3)
EGFR: 21 ML/MIN/1.73M2 — LOW
EGFR: 21 ML/MIN/1.73M2 — LOW
EOSINOPHIL # BLD AUTO: 0.56 K/UL — HIGH (ref 0–0.5)
EOSINOPHIL # BLD AUTO: 0.74 K/UL — HIGH (ref 0–0.5)
EOSINOPHIL NFR BLD AUTO: 2.9 % — SIGNIFICANT CHANGE UP (ref 0–6)
EOSINOPHIL NFR BLD AUTO: 4.4 % — SIGNIFICANT CHANGE UP (ref 0–6)
GLUCOSE BLDC GLUCOMTR-MCNC: 111 MG/DL — HIGH (ref 70–99)
GLUCOSE BLDC GLUCOMTR-MCNC: 119 MG/DL — HIGH (ref 70–99)
GLUCOSE BLDC GLUCOMTR-MCNC: 135 MG/DL — HIGH (ref 70–99)
GLUCOSE SERPL-MCNC: 108 MG/DL — HIGH (ref 70–99)
HAPTOGLOB SERPL-MCNC: <20 MG/DL — LOW (ref 34–200)
HCT VFR BLD CALC: 18.9 % — CRITICAL LOW (ref 39–50)
HCT VFR BLD CALC: 25 % — LOW (ref 39–50)
HGB BLD-MCNC: 6.9 G/DL — CRITICAL LOW (ref 13–17)
HGB BLD-MCNC: 8.4 G/DL — LOW (ref 13–17)
IANC: 11.06 K/UL — HIGH (ref 1.8–7.4)
IANC: 14.7 K/UL — HIGH (ref 1.8–7.4)
IMM GRANULOCYTES NFR BLD AUTO: 1.3 % — HIGH (ref 0–0.9)
IMM GRANULOCYTES NFR BLD AUTO: 1.5 % — HIGH (ref 0–0.9)
INR BLD: 0.97 RATIO — SIGNIFICANT CHANGE UP (ref 0.85–1.16)
LDH SERPL L TO P-CCNC: 446 U/L — HIGH (ref 135–225)
LYMPHOCYTES # BLD AUTO: 1.05 K/UL — SIGNIFICANT CHANGE UP (ref 1–3.3)
LYMPHOCYTES # BLD AUTO: 13.4 % — SIGNIFICANT CHANGE UP (ref 13–44)
LYMPHOCYTES # BLD AUTO: 2.25 K/UL — SIGNIFICANT CHANGE UP (ref 1–3.3)
LYMPHOCYTES # BLD AUTO: 5.5 % — LOW (ref 13–44)
MAGNESIUM SERPL-MCNC: 2.1 MG/DL — SIGNIFICANT CHANGE UP (ref 1.6–2.6)
MCHC RBC-ENTMCNC: 29.4 PG — SIGNIFICANT CHANGE UP (ref 27–34)
MCHC RBC-ENTMCNC: 31.6 PG — SIGNIFICANT CHANGE UP (ref 27–34)
MCHC RBC-ENTMCNC: 33.6 G/DL — SIGNIFICANT CHANGE UP (ref 32–36)
MCHC RBC-ENTMCNC: 36.5 G/DL — HIGH (ref 32–36)
MCV RBC AUTO: 80.4 FL — SIGNIFICANT CHANGE UP (ref 80–100)
MCV RBC AUTO: 94 FL — SIGNIFICANT CHANGE UP (ref 80–100)
MONOCYTES # BLD AUTO: 2.36 K/UL — HIGH (ref 0–0.9)
MONOCYTES # BLD AUTO: 2.41 K/UL — HIGH (ref 0–0.9)
MONOCYTES NFR BLD AUTO: 12.6 % — SIGNIFICANT CHANGE UP (ref 2–14)
MONOCYTES NFR BLD AUTO: 14.1 % — HIGH (ref 2–14)
NEUTROPHILS # BLD AUTO: 11.06 K/UL — HIGH (ref 1.8–7.4)
NEUTROPHILS # BLD AUTO: 14.7 K/UL — HIGH (ref 1.8–7.4)
NEUTROPHILS NFR BLD AUTO: 65.9 % — SIGNIFICANT CHANGE UP (ref 43–77)
NEUTROPHILS NFR BLD AUTO: 77.1 % — HIGH (ref 43–77)
NRBC # BLD AUTO: 0.04 K/UL — HIGH (ref 0–0)
NRBC # BLD AUTO: 0.06 K/UL — HIGH (ref 0–0)
NRBC # FLD: 0.04 K/UL — HIGH (ref 0–0)
NRBC # FLD: 0.06 K/UL — HIGH (ref 0–0)
NRBC BLD AUTO-RTO: 0 /100 WBCS — SIGNIFICANT CHANGE UP (ref 0–0)
NRBC BLD AUTO-RTO: 0 /100 WBCS — SIGNIFICANT CHANGE UP (ref 0–0)
PHOSPHATE SERPL-MCNC: 8.3 MG/DL — HIGH (ref 2.5–4.5)
PLATELET # BLD AUTO: 323 K/UL — SIGNIFICANT CHANGE UP (ref 150–400)
PLATELET # BLD AUTO: 323 K/UL — SIGNIFICANT CHANGE UP (ref 150–400)
POTASSIUM SERPL-MCNC: 3.5 MMOL/L — SIGNIFICANT CHANGE UP (ref 3.5–5.3)
POTASSIUM SERPL-SCNC: 3.5 MMOL/L — SIGNIFICANT CHANGE UP (ref 3.5–5.3)
PROT SERPL-MCNC: 7 G/DL — SIGNIFICANT CHANGE UP (ref 6–8.3)
PROTHROM AB SERPL-ACNC: 11.6 SEC — SIGNIFICANT CHANGE UP (ref 9.9–13.4)
RBC # BLD: 2.35 M/UL — LOW (ref 4.2–5.8)
RBC # BLD: 2.35 M/UL — LOW (ref 4.2–5.8)
RBC # BLD: 2.66 M/UL — LOW (ref 4.2–5.8)
RBC # FLD: 20.8 % — HIGH (ref 10.3–14.5)
RBC # FLD: 21.2 % — HIGH (ref 10.3–14.5)
RETICS #: 129.5 K/UL — HIGH (ref 25–125)
RETICS/RBC NFR: 5.5 % — HIGH (ref 0.5–2.5)
RH IG SCN BLD-IMP: POSITIVE — SIGNIFICANT CHANGE UP
SODIUM SERPL-SCNC: 133 MMOL/L — LOW (ref 135–145)
URATE SERPL-MCNC: 7.4 MG/DL — SIGNIFICANT CHANGE UP (ref 3.4–8.8)
VANCOMYCIN TROUGH SERPL-MCNC: 26.1 UG/ML — CRITICAL HIGH (ref 10–20)
WBC # BLD: 16.78 K/UL — HIGH (ref 3.8–10.5)
WBC # BLD: 19.08 K/UL — HIGH (ref 3.8–10.5)
WBC # FLD AUTO: 16.78 K/UL — HIGH (ref 3.8–10.5)
WBC # FLD AUTO: 19.08 K/UL — HIGH (ref 3.8–10.5)

## 2025-04-05 PROCEDURE — 99291 CRITICAL CARE FIRST HOUR: CPT

## 2025-04-05 PROCEDURE — 99232 SBSQ HOSP IP/OBS MODERATE 35: CPT | Mod: GC

## 2025-04-05 RX ORDER — DIAZEPAM 2 MG/1
10 TABLET ORAL THREE TIMES A DAY
Refills: 0 | Status: DISCONTINUED | OUTPATIENT
Start: 2025-04-05 | End: 2025-04-07

## 2025-04-05 RX ORDER — HEPARIN SODIUM 1000 [USP'U]/ML
5000 INJECTION INTRAVENOUS; SUBCUTANEOUS EVERY 8 HOURS
Refills: 0 | Status: DISCONTINUED | OUTPATIENT
Start: 2025-04-05 | End: 2025-04-07

## 2025-04-05 RX ADMIN — IPRATROPIUM BROMIDE AND ALBUTEROL SULFATE 3 MILLILITER(S): .5; 2.5 SOLUTION RESPIRATORY (INHALATION) at 08:50

## 2025-04-05 RX ADMIN — Medication 1 APPLICATION(S): at 12:47

## 2025-04-05 RX ADMIN — Medication 15 MILLILITER(S): at 05:52

## 2025-04-05 RX ADMIN — DOXAZOSIN MESYLATE 1 MILLIGRAM(S): 8 TABLET ORAL at 21:25

## 2025-04-05 RX ADMIN — IPRATROPIUM BROMIDE AND ALBUTEROL SULFATE 3 MILLILITER(S): .5; 2.5 SOLUTION RESPIRATORY (INHALATION) at 15:01

## 2025-04-05 RX ADMIN — IPRATROPIUM BROMIDE AND ALBUTEROL SULFATE 3 MILLILITER(S): .5; 2.5 SOLUTION RESPIRATORY (INHALATION) at 21:02

## 2025-04-05 RX ADMIN — PROPOFOL 26.6 MICROGRAM(S)/KG/MIN: 10 INJECTION, EMULSION INTRAVENOUS at 08:36

## 2025-04-05 RX ADMIN — FOLIC ACID 1 MILLIGRAM(S): 1 TABLET ORAL at 12:45

## 2025-04-05 RX ADMIN — Medication 1 APPLICATION(S): at 05:52

## 2025-04-05 RX ADMIN — Medication 650 MILLIGRAM(S): at 14:36

## 2025-04-05 RX ADMIN — Medication 650 MILLIGRAM(S): at 05:51

## 2025-04-05 RX ADMIN — Medication 15 MILLILITER(S): at 18:18

## 2025-04-05 RX ADMIN — DIAZEPAM 10 MILLIGRAM(S): 2 TABLET ORAL at 14:36

## 2025-04-05 RX ADMIN — PROPOFOL 26.6 MICROGRAM(S)/KG/MIN: 10 INJECTION, EMULSION INTRAVENOUS at 19:18

## 2025-04-05 RX ADMIN — Medication 1 APPLICATION(S): at 18:30

## 2025-04-05 RX ADMIN — MEROPENEM 100 MILLIGRAM(S): 1 INJECTION INTRAVENOUS at 15:00

## 2025-04-05 RX ADMIN — DIAZEPAM 10 MILLIGRAM(S): 2 TABLET ORAL at 21:25

## 2025-04-05 RX ADMIN — HEPARIN SODIUM 5000 UNIT(S): 1000 INJECTION INTRAVENOUS; SUBCUTANEOUS at 21:26

## 2025-04-05 RX ADMIN — LEVETIRACETAM 500 MILLIGRAM(S): 10 INJECTION, SOLUTION INTRAVENOUS at 18:18

## 2025-04-05 RX ADMIN — IPRATROPIUM BROMIDE AND ALBUTEROL SULFATE 3 MILLILITER(S): .5; 2.5 SOLUTION RESPIRATORY (INHALATION) at 03:22

## 2025-04-05 RX ADMIN — Medication 650 MILLIGRAM(S): at 21:25

## 2025-04-05 RX ADMIN — HEPARIN SODIUM 5000 UNIT(S): 1000 INJECTION INTRAVENOUS; SUBCUTANEOUS at 14:36

## 2025-04-05 RX ADMIN — LEVETIRACETAM 500 MILLIGRAM(S): 10 INJECTION, SOLUTION INTRAVENOUS at 05:52

## 2025-04-05 NOTE — PROGRESS NOTE ADULT - SUBJECTIVE AND OBJECTIVE BOX
MICU PROGRESS NOTE     HISTORY OF PRESENT ILLNESS         ROS: All negative except as listed above.    VITAL SIGNS:  ICU Vital Signs Last 24 Hrs  T(C): 36.2 (05 Apr 2025 04:00), Max: 36.8 (04 Apr 2025 08:00)  T(F): 97.2 (05 Apr 2025 04:00), Max: 98.2 (04 Apr 2025 08:00)  HR: 109 (05 Apr 2025 07:44) (91 - 114)  BP: 135/86 (05 Apr 2025 07:00) (93/56 - 141/89)  BP(mean): 100 (05 Apr 2025 07:00) (66 - 104)  ABP: --  ABP(mean): --  RR: 22 (05 Apr 2025 07:00) (19 - 23)  SpO2: 92% (05 Apr 2025 07:44) (92% - 100%)    O2 Parameters below as of 05 Apr 2025 07:00  Patient On (Oxygen Delivery Method): ventilator    O2 Concentration (%): 30      Mode: AC/ CMV (Assist Control/ Continuous Mandatory Ventilation), RR (machine): 20, TV (machine): 460, FiO2: 30, PEEP: 10, ITime: 1, MAP: 17, PIP: 37  Plateau pressure:   P/F ratio:     04-04 @ 07:01  -  04-05 @ 07:00  --------------------------------------------------------  IN: 1791.8 mL / OUT: 375 mL / NET: 1416.8 mL      CAPILLARY BLOOD GLUCOSE          ECG: reviewed.    PHYSICAL EXAM:    General: NAD, normal habitus, mild jaundice   Neuro: AAOx0,sedated non responsive to noxious stimuli, closed eyes, no verbal sounds, 4 mm pupils, sluggishly reactive pupils b/l   HEENT: icteric sclerae b/l , increased watery discharge from eyes   Chest: nonTTP   Heart: S1/S2, RRR   Lungs: CTA b/l, on MV   Abd: soft, nonTTP, nondistended   Ext: generalized trace pitting edema, LUE wrapped, nodule on R 1st digit proximal phalanges   Pulses: radial 2+ b/l, dorsalis pedis 2+ b/l   Lines/tubes/drains: A line L femoral, OG tube, flexicel   Psych: unable to assess     MEDICATIONS:  MEDICATIONS  (STANDING):  albuterol/ipratropium for Nebulization 3 milliLiter(s) Nebulizer every 6 hours  allopurinol 50 milliGRAM(s) Oral <User Schedule>  chlorhexidine 0.12% Liquid 15 milliLiter(s) Oral Mucosa every 12 hours  chlorhexidine 2% Cloths 1 Application(s) Topical daily  doxazosin 1 milliGRAM(s) Oral at bedtime  folic acid 1 milliGRAM(s) Oral daily  influenza   Vaccine 0.5 milliLiter(s) IntraMuscular once  levETIRAcetam  Solution 500 milliGRAM(s) Oral two times a day  meropenem  IVPB 500 milliGRAM(s) IV Intermittent every 24 hours  petrolatum Ophthalmic Ointment 1 Application(s) Both EYES two times a day  propofol Infusion 50 MICROgram(s)/kG/Min (26.6 mL/Hr) IV Continuous <Continuous>  sodium bicarbonate 650 milliGRAM(s) Oral three times a day    MEDICATIONS  (PRN):      ALLERGIES:  Allergies    penicillin (Pruritus)  hydroxyurea (Other)  piperacillin-tazobactam (Urticaria)  ceftriaxone (Anaphylaxis)    Intolerances        LABS:                        6.9    16.78 )-----------( 323      ( 05 Apr 2025 01:42 )             18.9     04-05    133[L]  |  93[L]  |  74[H]  ----------------------------<  108[H]  3.5   |  18[L]  |  3.54[H]    Ca    9.0      05 Apr 2025 01:02  Phos  8.3     04-05  Mg     2.10     04-05    TPro  7.0  /  Alb  2.6[L]  /  TBili  15.8[H]  /  DBili  14.3[H]  /  AST  116[H]  /  ALT  29  /  AlkPhos  250[H]  04-05    PT/INR - ( 05 Apr 2025 01:17 )   PT: 11.6 sec;   INR: 0.97 ratio         PTT - ( 05 Apr 2025 01:17 )  PTT:23.3 sec  Urinalysis Basic - ( 05 Apr 2025 01:02 )    Color: x / Appearance: x / SG: x / pH: x  Gluc: 108 mg/dL / Ketone: x  / Bili: x / Urobili: x   Blood: x / Protein: x / Nitrite: x   Leuk Esterase: x / RBC: x / WBC x   Sq Epi: x / Non Sq Epi: x / Bacteria: x      ABG:      vBG:    Micro:    Culture - Blood (collected 03-20-25 @ 10:04)  Source: Blood Blood-Peripheral  Final Report (03-25-25 @ 13:01):    No growth at 5 days        Culture - Sputum (collected 03-26-25 @ 11:45)  Source: ET Tube ET Tube  Gram Stain (03-26-25 @ 22:28):    Few Squamous epithelial cells per low power field    Moderate polymorphonuclear leukocytes per low power field    Few Yeast per oil power field  Final Report (03-28-25 @ 08:15):    Commensal lay consistent with body site    Culture - Sputum (collected 03-22-25 @ 17:15)  Source: ET Tube ET Tube  Gram Stain (03-22-25 @ 23:42):    Numerous polymorphonuclear leukocytes per low power field    Rare Squamous epithelial cells per low power field    No organisms seen per oil power field  Final Report (03-24-25 @ 06:52):    Commensal lay consistent with body site        RADIOLOGY & ADDITIONAL TESTS: Reviewed. MICU PROGRESS NOTE     HISTORY OF PRESENT ILLNESS     Overnight, got 1 uPRBC. Passing TOV.     Seen and examined at bedside, patient remains unable to engage meaningfully.       ROS: All negative except as listed above.    VITAL SIGNS:  ICU Vital Signs Last 24 Hrs  T(C): 36.2 (05 Apr 2025 04:00), Max: 36.8 (04 Apr 2025 08:00)  T(F): 97.2 (05 Apr 2025 04:00), Max: 98.2 (04 Apr 2025 08:00)  HR: 109 (05 Apr 2025 07:44) (91 - 114)  BP: 135/86 (05 Apr 2025 07:00) (93/56 - 141/89)  BP(mean): 100 (05 Apr 2025 07:00) (66 - 104)  ABP: --  ABP(mean): --  RR: 22 (05 Apr 2025 07:00) (19 - 23)  SpO2: 92% (05 Apr 2025 07:44) (92% - 100%)    O2 Parameters below as of 05 Apr 2025 07:00  Patient On (Oxygen Delivery Method): ventilator    O2 Concentration (%): 30      Mode: AC/ CMV (Assist Control/ Continuous Mandatory Ventilation), RR (machine): 20, TV (machine): 460, FiO2: 30, PEEP: 10, ITime: 1, MAP: 17, PIP: 37  Plateau pressure:   P/F ratio:     04-04 @ 07:01  -  04-05 @ 07:00  --------------------------------------------------------  IN: 1791.8 mL / OUT: 375 mL / NET: 1416.8 mL      CAPILLARY BLOOD GLUCOSE          ECG: reviewed.    PHYSICAL EXAM:    General: NAD, normal habitus, mild jaundice   Neuro: AAOx0,sedated non responsive to noxious stimuli, closed eyes, no verbal sounds, 4 mm pupils, sluggishly reactive pupils b/l   HEENT: icteric sclerae b/, trachoestomy clean/dry/intact  Chest: nonTTP   Heart: S1/S2, RRR   Lungs: CTA b/l, on MV   Abd: soft, nonTTP, nondistended   Ext: generalized trace pitting edema, LUE wrapped, nodule on R 1st digit proximal phalanges   Pulses: radial 2+ b/l, dorsalis pedis 2+ b/l   Lines/tubes/drains: L femoral shiley, NGT, condom cath, , flexicel   Psych: unable to assess     MEDICATIONS:  MEDICATIONS  (STANDING):  albuterol/ipratropium for Nebulization 3 milliLiter(s) Nebulizer every 6 hours  allopurinol 50 milliGRAM(s) Oral <User Schedule>  chlorhexidine 0.12% Liquid 15 milliLiter(s) Oral Mucosa every 12 hours  chlorhexidine 2% Cloths 1 Application(s) Topical daily  doxazosin 1 milliGRAM(s) Oral at bedtime  folic acid 1 milliGRAM(s) Oral daily  influenza   Vaccine 0.5 milliLiter(s) IntraMuscular once  levETIRAcetam  Solution 500 milliGRAM(s) Oral two times a day  meropenem  IVPB 500 milliGRAM(s) IV Intermittent every 24 hours  petrolatum Ophthalmic Ointment 1 Application(s) Both EYES two times a day  propofol Infusion 50 MICROgram(s)/kG/Min (26.6 mL/Hr) IV Continuous <Continuous>  sodium bicarbonate 650 milliGRAM(s) Oral three times a day    MEDICATIONS  (PRN):      ALLERGIES:  Allergies    penicillin (Pruritus)  hydroxyurea (Other)  piperacillin-tazobactam (Urticaria)  ceftriaxone (Anaphylaxis)    Intolerances        LABS:                        6.9    16.78 )-----------( 323      ( 05 Apr 2025 01:42 )             18.9     04-05    133[L]  |  93[L]  |  74[H]  ----------------------------<  108[H]  3.5   |  18[L]  |  3.54[H]    Ca    9.0      05 Apr 2025 01:02  Phos  8.3     04-05  Mg     2.10     04-05    TPro  7.0  /  Alb  2.6[L]  /  TBili  15.8[H]  /  DBili  14.3[H]  /  AST  116[H]  /  ALT  29  /  AlkPhos  250[H]  04-05    PT/INR - ( 05 Apr 2025 01:17 )   PT: 11.6 sec;   INR: 0.97 ratio         PTT - ( 05 Apr 2025 01:17 )  PTT:23.3 sec  Urinalysis Basic - ( 05 Apr 2025 01:02 )    Color: x / Appearance: x / SG: x / pH: x  Gluc: 108 mg/dL / Ketone: x  / Bili: x / Urobili: x   Blood: x / Protein: x / Nitrite: x   Leuk Esterase: x / RBC: x / WBC x   Sq Epi: x / Non Sq Epi: x / Bacteria: x      ABG:      vBG:    Micro:    Culture - Blood (collected 03-20-25 @ 10:04)  Source: Blood Blood-Peripheral  Final Report (03-25-25 @ 13:01):    No growth at 5 days        Culture - Sputum (collected 03-26-25 @ 11:45)  Source: ET Tube ET Tube  Gram Stain (03-26-25 @ 22:28):    Few Squamous epithelial cells per low power field    Moderate polymorphonuclear leukocytes per low power field    Few Yeast per oil power field  Final Report (03-28-25 @ 08:15):    Commensal lay consistent with body site    Culture - Sputum (collected 03-22-25 @ 17:15)  Source: ET Tube ET Tube  Gram Stain (03-22-25 @ 23:42):    Numerous polymorphonuclear leukocytes per low power field    Rare Squamous epithelial cells per low power field    No organisms seen per oil power field  Final Report (03-24-25 @ 06:52):    Commensal lay consistent with body site        RADIOLOGY & ADDITIONAL TESTS: Reviewed.

## 2025-04-05 NOTE — PROGRESS NOTE ADULT - ASSESSMENT
Patient is 44y Male with PMHx of sickle cell disease admitted for anemia concerning for sickle cell crisis s/p ureteroscopy w/ L kidney biopsy 3/19, post-operative course c/b acute hemorrhagic shock, PEA arrest with ROSC; currently with acute renal failure on HD and global anoxic brain injury and urothelial cancer; s/p tracheostomy.       NEURO:  #Global anoxic brain injury   AAOx0. On sedation with no further myoclonus Guarded prognosis.   - On propofol; wean as tolerated   - D/c fenatnyl   - S/p versed gtt, weaned off 3/23  - Repeat CT B 3/24 showing diffuse sulcal effacement with increased intracrnail pressure, and few patchy foci of cortical blurring   - 3/25 MRIB with diffuse global abnormal supratentorial cortical restricted diffusion consistent with global hypoxic injury       #Seizures  No further myoclonus of upper body after restarting propofol.   - Witness myoclonic jerking concerning for seizures iso anoxic brain injury  - s/p 2.5g keppra load  - vEEG report: "Abundant myoclonic seizures in the setting of a severe diffuse/multifocal cerebral dysfunction which can be seen in the setting of sedative effect or due to an anoxic injury, with improvement after 20:50 suggesting a lowering of sedation"; will f/u with neuro recommendations  -C/w levetiracetam 500 mg po BID   - Trend enolase   - repeat vEEG 3/24 showing severe cerebral dysfunction   -C/w midazolam 2mg IV q8 PRN       CV:  #Shock - hemorrhagic shock s/p kidney biopsy s/p 5u pRBC, 3 plasma, 3 plt- RESOLVED   Hemodynamically stable.   last dose of lovenox AC on 3/18 at 5PM   Hgb 8 -> ~3   - Not on levophed, MAP goal>65  - Monitor cbc q24      #Hx of VTE (R IJ thrombus, L brachial DVT)  - CTM, hold AC given hemorrhage  - Bullae present on arm with DVT,  - Appreciate orthopedics hand surgery consult, not compartment syndrome       PULM:  #Acute hypoxic respiratory failure  #Pna- 2/2 ventilator related   #Tracheostomy   In the setting of PEA arrest. Triggering ventilator on CPAP. Still requiring breathing support.   CXR with R lung field and left mid and lower lung field hazy opacities.  - Wean vent as able  - HTS nebs, duonebs  - 3/28 CXR showing increasing RUL consolidation   -Empiric abx for pna   -4/4 bedside tracheostomy       RENAL:  #Acute renal failure.   #Renal mass s/p biopsy  #Acidosis- RESOLVED   Oliguric. Remains hypervolemic.    - TOV 3/28, failed; replaced daniel on 4/1; TOV 4/3   - C/w doxazoin 1 mg po qhs to optimize subsequent TOV trial   - Trend Cr, uop, lytes  - S/p bicarb drip  - Per nephro, HD session #1 3/24, HD sessions #2 3/25, HD session #3 3/27; planning HD #4 3/29; 4/3 HD #5; will monitor off HD   -C/w sodium bicarbonate 650 mg po TID   - 3/31 removed L IJV shiley; 4/2 placed R fem shiley    - Per pathology result of renal biopsy, noninvasive urothelial malignancy             GI:  #Shock liver  Unchanged  LFTs. Jaundice with hyperbilirubinemia (direct).   - Trend liver enzymes  - RUQ US showing enlarged periportal and periaortic enlarged lymph nodes and enlarged left lateral liver lobe and CBD 9 mm.       #Diet:  - NPO w/ tube feed   - Pending GI for PEG; may need general-surgery consult for G tube due to hepatomegaly     HEMATOLOGIC/ONCOLGOGIC   #Sickle cell crisis  #Acute blood loss anemia- 2/2 sickle cells and uremia vs critical illness   Downtrending direct hyperbilirubinemia,  gradually. Stable Hb. Increasing reticulocyte count.   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 CXR showing increased RUL opacification.   - D/c  aztreonam 2000 mg IV qd ( started 3/22- 3/27)   - C/w meropenem 500 mg IV qd (started 3/27-3/31); (4/2-)   - S/p vancomycin 1500 mg IV qd (4/2); 2nd dose 4/3   - 4/2- CXR showing increasing RUL consolidation    MSK:  #infiltration  Infiltration of sodium bicarb into left arm, now with arm distension and bullae. Elevated uric acid with nodule suggegstive of podagra however no signs of active gout flare.   -Appreciate orthopedic hand surgery, not compartment syndrome   - CTM    Ethics: Full Code   -S/p trach and planning for PEG    Patient is 44y Male with PMHx of sickle cell disease admitted for anemia concerning for sickle cell crisis s/p ureteroscopy w/ L kidney biopsy 3/19, post-operative course c/b acute hemorrhagic shock, PEA arrest with ROSC; currently with acute renal failure on HD and global anoxic brain injury and urothelial cancer; s/p tracheostomy pending PEG palcement evaluation.       NEURO:  #Global anoxic brain injury   AAOx0. On sedation with no further myoclonus Guarded prognosis.   - Wean propofol   - D/c fenatnyl   - S/p versed gtt, weaned off 3/23  - Repeat CT B 3/24 showing diffuse sulcal effacement with increased intracrnail pressure, and few patchy foci of cortical blurring   - 3/25 MRIB with diffuse global abnormal supratentorial cortical restricted diffusion consistent with global hypoxic injury       #Seizures  No further myoclonus of upper body after restarting propofol.   - Witness myoclonic jerking concerning for seizures iso anoxic brain injury  - s/p 2.5g keppra load  - vEEG report: "Abundant myoclonic seizures in the setting of a severe diffuse/multifocal cerebral dysfunction which can be seen in the setting of sedative effect or due to an anoxic injury, with improvement after 20:50 suggesting a lowering of sedation"; will f/u with neuro recommendations  -C/w levetiracetam 500 mg po BID   - Trend enolase   - repeat vEEG 3/24 showing severe cerebral dysfunction   -C/w midazolam 2mg IV q8 PRN   -Initiate diazepam 10 mg po TID   -Reach out to neurology about long-term myoclonus suppressive medication    CV:  #Shock - hemorrhagic shock s/p kidney biopsy s/p 5u pRBC, 3 plasma, 3 plt- RESOLVED   Hemodynamically stable.   last dose of lovenox AC on 3/18 at 5PM   Hgb 8 -> ~3   - Not on levophed, MAP goal>65  - Monitor cbc q24      #Hx of VTE (R IJ thrombus, L brachial DVT)  - CTM, hold AC given hemorrhage  - Bullae present on arm with DVT,  - Appreciate orthopedics hand surgery consult, not compartment syndrome       PULM:  #Acute hypoxic respiratory failure  #Pna- 2/2 ventilator related   #Tracheostomy   In the setting of PEA arrest. Triggering ventilator on CPAP. Still requiring breathing support.   CXR with R lung field and left mid and lower lung field hazy opacities.  - Wean vent as able  - HTS nebs, duonebs  - 3/28 CXR showing increasing RUL consolidation   -Empiric abx for pna   -4/4 bedside tracheostomy       RENAL:  #Acute renal failure.   #Renal mass s/p biopsy  #Acidosis- RESOLVED   Oliguric. Remains hypervolemic.    - TOV 3/28, failed; replaced daniel on 4/1; TOV 4/3   - C/w doxazoin 1 mg po qhs to optimize subsequent TOV trial   - Trend Cr, uop, lytes  - S/p bicarb drip  - Per nephro, HD session #1 3/24, HD sessions #2 3/25, HD session #3 3/27; planning HD #4 3/29; 4/3 HD #5; will monitor off HD   -C/w sodium bicarbonate 650 mg po TID   - 3/31 removed L IJV shiley; 4/2 placed R fem shiley    - Per pathology result of renal biopsy, noninvasive urothelial malignancy             GI:  #Shock liver  Unchanged  LFTs. Jaundice with hyperbilirubinemia (direct).   - Trend liver enzymes  - RUQ US showing enlarged periportal and periaortic enlarged lymph nodes and enlarged left lateral liver lobe and CBD 9 mm.       #Diet:  - NPO w/ tube feed   - GI unable to place PEG due to hepatomegaly  - Per IR consult, need CT abdomen without contrast to assess windows; if unable to, may need to consider gen-surg consult     HEMATOLOGIC/ONCOLGOGIC   #Sickle cell crisis  #Acute blood loss anemia- 2/2 sickle cells and uremia vs critical illness   Downtrending direct hyperbilirubinemia,  gradually. Stable Hb. Increasing reticulocyte count.   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 CXR showing increased RUL opacification.   - D/c  aztreonam 2000 mg IV qd ( started 3/22- 3/27)   - C/w meropenem 500 mg IV qd (started 3/27-3/31); (4/2-)   - S/p vancomycin 1500 mg IV qd (4/2); 2nd dose 4/4, 3rd dose 4/5  - 4/2- CXR showing increasing RUL consolidation    MSK:  #infiltration  Infiltration of sodium bicarb into left arm, now with arm distension and bullae. Elevated uric acid with nodule suggegstive of podagra however no signs of active gout flare.   -Appreciate orthopedic hand surgery, not compartment syndrome   - CTM    Ethics: Full Code   -S/p trach and planning for PEG

## 2025-04-05 NOTE — PROGRESS NOTE ADULT - ATTENDING COMMENTS
1. MATA - no imminent need for dialysis but will monitor.  See above discussion.   2. Anemia - transfusion per MICU team  3. Hyperphosphatemia - repeat 4/6 am.  Can start on Sevelamer 800 mg TID.  Titrate to course.  Monitor with ongoing dialysis.   4. Metabolic acidosis - remains on oral bicarbonate.

## 2025-04-05 NOTE — PROGRESS NOTE ADULT - ATTENDING COMMENTS
Patient is a 45 yo M w/ SSD, UE DVT, gout, HTN, with L renal mass admitted with acute anemia/ sickle cell crisis and evaluation of his left renal mass s/p left ureteroscopy and biopsy (3/19/25) with 24 hour post operative course complicated by rapid responses for hypoglycemia c/b cardiac arrest x 4 minutes CPR/1 epi with ROSC, transferred to MICU for further care, found to have anoxic brain injury. MRI showing anoxia. 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 Continue ventilatory support, s/p tracheostomy 4/4/25. Myoclonic jerk movements controlled on propofol; continue keppra, start valium to assist with weaning propofol. Pending CT Abd/P to evaluate for PEG palcement by IR.

## 2025-04-05 NOTE — PROGRESS NOTE ADULT - SUBJECTIVE AND OBJECTIVE BOX
[facility] DIVISION OF KIDNEY DISEASES AND HYPERTENSION   FOLLOW UP NOTE    --------------------------------------------------------------------------------  Chief Complaint:    24 hour events/subjective: Pt. was seen and examined today in MICU. Remains intubated and sedated.      PAST HISTORY  --------------------------------------------------------------------------------  No significant changes to PMH, PSH, FHx, SHx, unless otherwise noted    ALLERGIES & MEDICATIONS  --------------------------------------------------------------------------------  Allergies    penicillin (Pruritus)  hydroxyurea (Other)  piperacillin-tazobactam (Urticaria)  ceftriaxone (Anaphylaxis)    Intolerances      Standing Inpatient Medications  albuterol/ipratropium for Nebulization 3 milliLiter(s) Nebulizer every 6 hours  allopurinol 50 milliGRAM(s) Oral <User Schedule>  chlorhexidine 0.12% Liquid 15 milliLiter(s) Oral Mucosa every 12 hours  chlorhexidine 2% Cloths 1 Application(s) Topical daily  doxazosin 1 milliGRAM(s) Oral at bedtime  folic acid 1 milliGRAM(s) Oral daily  influenza   Vaccine 0.5 milliLiter(s) IntraMuscular once  levETIRAcetam  Solution 500 milliGRAM(s) Oral two times a day  meropenem  IVPB 500 milliGRAM(s) IV Intermittent every 24 hours  petrolatum Ophthalmic Ointment 1 Application(s) Both EYES two times a day  propofol Infusion 50 MICROgram(s)/kG/Min IV Continuous <Continuous>  sodium bicarbonate 650 milliGRAM(s) Oral three times a day    PRN Inpatient Medications      REVIEW OF SYSTEMS  --------------------------------------------------------------------------------  unable to obtain due to clinic status      VITALS/PHYSICAL EXAM  --------------------------------------------------------------------------------  T(C): 36.2 (04-05-25 @ 04:00), Max: 36.5 (04-04-25 @ 16:00)  HR: 109 (04-05-25 @ 10:49) (91 - 109)  BP: 125/73 (04-05-25 @ 10:00) (100/55 - 141/89)  RR: 24 (04-05-25 @ 10:00) (19 - 24)  SpO2: 93% (04-05-25 @ 10:49) (92% - 100%)  Wt(kg): --        04-04-25 @ 07:01  -  04-05-25 @ 07:00  --------------------------------------------------------  IN: 1791.8 mL / OUT: 375 mL / NET: 1416.8 mL        Physical Exam:  	Gen: Intubated  	HEENT: +ETT  	Pulm: CTA B/L  	CV: S1S2+  	Abd: Soft, +BS   	Ext: +edema  	Neuro: Sedated  	Skin: Warm and dry  	Dialysis access: Right femoral [facility]      LABS/STUDIES  --------------------------------------------------------------------------------              6.9    16.78 >-----------<  323      [04-05-25 @ 01:42]              18.9     133  |  93  |  74  ----------------------------<  108      [04-05-25 @ 01:02]  3.5   |  18  |  3.54        Ca     9.0     [04-05-25 @ 01:02]      iCa    1.16     [04-05 @ 01:42]      Mg     2.10     [04-05-25 @ 01:02]      Phos  8.3     [04-05-25 @ 01:02]    TPro  7.0  /  Alb  2.6  /  TBili  15.8  /  DBili  14.3  /  AST  116  /  ALT  29  /  AlkPhos  250  [04-05-25 @ 01:02]    PT/INR: PT 11.6 , INR 0.97       [04-05-25 @ 01:17]  PTT: 23.3       [04-05-25 @ 01:17]    Uric acid 7.4      [04-05-25 @ 01:02]        [04-05-25 @ 01:02]    Creatinine Trend:  SCr 3.54 [04-05 @ 01:02]  SCr 3.06 [04-04 @ 00:35]  SCr 5.12 [04-03 @ 01:35]  SCr 5.38 [04-02 @ 01:12]  SCr 5.23 [04-01 @ 00:26]    Urinalysis - [04-05-25 @ 01:02]      Color  / Appearance  / SG  / pH       Gluc 108 / Ketone   / Bili  / Urobili        Blood  / Protein  / Leuk Est  / Nitrite       RBC  / WBC  / Hyaline  / Gran  / Sq Epi  / Non Sq Epi  / Bacteria       HBsAb 50.9      [03-29-25 @ 07:15]  HBsAg Nonreact      [03-17-25 @ 15:26]  HBcAb Nonreact      [03-29-25 @ 07:15]  HCV 0.10, Nonreact      [03-17-25 @ 15:26]  HIV Nonreact      [03-17-25 @ 15:26]      Tacrolimus  Cyclosporine  Sirolimus  Mycophenolate  BK PCR  CMV PCRCMVPCR Log: NotDetec [id number]/mL (12-27 @ 05:38)    Parvo PCR  EBV PCR

## 2025-04-06 LAB
ALBUMIN SERPL ELPH-MCNC: 2.4 G/DL — LOW (ref 3.3–5)
ALP SERPL-CCNC: 318 U/L — HIGH (ref 40–120)
ALT FLD-CCNC: 37 U/L — SIGNIFICANT CHANGE UP (ref 4–41)
ANION GAP SERPL CALC-SCNC: 23 MMOL/L — HIGH (ref 7–14)
APPEARANCE UR: ABNORMAL
APTT BLD: 30.1 SEC — SIGNIFICANT CHANGE UP (ref 24.5–35.6)
AST SERPL-CCNC: 145 U/L — HIGH (ref 4–40)
BACTERIA # UR AUTO: ABNORMAL /HPF
BASOPHILS # BLD AUTO: 0.1 K/UL — SIGNIFICANT CHANGE UP (ref 0–0.2)
BASOPHILS NFR BLD AUTO: 0.6 % — SIGNIFICANT CHANGE UP (ref 0–2)
BILIRUB DIRECT SERPL-MCNC: 15.6 MG/DL — SIGNIFICANT CHANGE UP (ref 0–0.3)
BILIRUB INDIRECT FLD-MCNC: 2.6 MG/DL — HIGH (ref 0–1)
BILIRUB SERPL-MCNC: 17 MG/DL — HIGH (ref 0.2–1.2)
BILIRUB SERPL-MCNC: 17.1 MG/DL — HIGH (ref 0.2–1.2)
BILIRUB UR-MCNC: ABNORMAL
BLOOD GAS VENOUS COMPREHENSIVE RESULT: SIGNIFICANT CHANGE UP
BUN SERPL-MCNC: 93 MG/DL — HIGH (ref 7–23)
CA-I BLD-SCNC: 1.15 MMOL/L — SIGNIFICANT CHANGE UP (ref 1.15–1.29)
CALCIUM SERPL-MCNC: 9.4 MG/DL — SIGNIFICANT CHANGE UP (ref 8.4–10.5)
CAST: 0 /LPF — SIGNIFICANT CHANGE UP (ref 0–4)
CHLORIDE SERPL-SCNC: 94 MMOL/L — LOW (ref 98–107)
CO2 SERPL-SCNC: 16 MMOL/L — LOW (ref 22–31)
COLOR SPEC: SIGNIFICANT CHANGE UP
CREAT SERPL-MCNC: 3.88 MG/DL — HIGH (ref 0.5–1.3)
DIFF PNL FLD: ABNORMAL
EGFR: 19 ML/MIN/1.73M2 — LOW
EGFR: 19 ML/MIN/1.73M2 — LOW
EOSINOPHIL # BLD AUTO: 0.53 K/UL — HIGH (ref 0–0.5)
EOSINOPHIL NFR BLD AUTO: 3.3 % — SIGNIFICANT CHANGE UP (ref 0–6)
GLUCOSE BLDC GLUCOMTR-MCNC: 116 MG/DL — HIGH (ref 70–99)
GLUCOSE BLDC GLUCOMTR-MCNC: 133 MG/DL — HIGH (ref 70–99)
GLUCOSE SERPL-MCNC: 118 MG/DL — HIGH (ref 70–99)
GLUCOSE UR QL: NEGATIVE MG/DL — SIGNIFICANT CHANGE UP
HAPTOGLOB SERPL-MCNC: <20 MG/DL — LOW (ref 34–200)
HCT VFR BLD CALC: 20.3 % — CRITICAL LOW (ref 39–50)
HGB BLD-MCNC: 7.5 G/DL — LOW (ref 13–17)
IANC: 12.34 K/UL — HIGH (ref 1.8–7.4)
IMM GRANULOCYTES NFR BLD AUTO: 1.7 % — HIGH (ref 0–0.9)
INR BLD: 1.04 RATIO — SIGNIFICANT CHANGE UP (ref 0.85–1.16)
KETONES UR-MCNC: NEGATIVE MG/DL — SIGNIFICANT CHANGE UP
LDH SERPL L TO P-CCNC: 426 U/L — HIGH (ref 135–225)
LEUKOCYTE ESTERASE UR-ACNC: ABNORMAL
LYMPHOCYTES # BLD AUTO: 1.07 K/UL — SIGNIFICANT CHANGE UP (ref 1–3.3)
LYMPHOCYTES # BLD AUTO: 6.6 % — LOW (ref 13–44)
MAGNESIUM SERPL-MCNC: 2.2 MG/DL — SIGNIFICANT CHANGE UP (ref 1.6–2.6)
MCHC RBC-ENTMCNC: 30 PG — SIGNIFICANT CHANGE UP (ref 27–34)
MCHC RBC-ENTMCNC: 36.9 G/DL — HIGH (ref 32–36)
MCV RBC AUTO: 81.2 FL — SIGNIFICANT CHANGE UP (ref 80–100)
MONOCYTES # BLD AUTO: 1.83 K/UL — HIGH (ref 0–0.9)
MONOCYTES NFR BLD AUTO: 11.3 % — SIGNIFICANT CHANGE UP (ref 2–14)
NEUTROPHILS # BLD AUTO: 12.34 K/UL — HIGH (ref 1.8–7.4)
NEUTROPHILS NFR BLD AUTO: 76.5 % — SIGNIFICANT CHANGE UP (ref 43–77)
NITRITE UR-MCNC: NEGATIVE — SIGNIFICANT CHANGE UP
NRBC # BLD AUTO: 0.05 K/UL — HIGH (ref 0–0)
NRBC # FLD: 0.05 K/UL — HIGH (ref 0–0)
NRBC BLD AUTO-RTO: 0 /100 WBCS — SIGNIFICANT CHANGE UP (ref 0–0)
OB PNL STL: POSITIVE
PH UR: 5.5 — SIGNIFICANT CHANGE UP (ref 5–8)
PHOSPHATE SERPL-MCNC: 8.7 MG/DL — HIGH (ref 2.5–4.5)
PLATELET # BLD AUTO: 303 K/UL — SIGNIFICANT CHANGE UP (ref 150–400)
POTASSIUM SERPL-MCNC: 3.7 MMOL/L — SIGNIFICANT CHANGE UP (ref 3.5–5.3)
POTASSIUM SERPL-SCNC: 3.7 MMOL/L — SIGNIFICANT CHANGE UP (ref 3.5–5.3)
PROT SERPL-MCNC: 6.9 G/DL — SIGNIFICANT CHANGE UP (ref 6–8.3)
PROT UR-MCNC: 100 MG/DL
PROTHROM AB SERPL-ACNC: 12.1 SEC — SIGNIFICANT CHANGE UP (ref 9.9–13.4)
RBC # BLD: 2.5 M/UL — LOW (ref 4.2–5.8)
RBC # BLD: 2.5 M/UL — LOW (ref 4.2–5.8)
RBC # FLD: 21 % — HIGH (ref 10.3–14.5)
RBC CASTS # UR COMP ASSIST: 69 /HPF — HIGH (ref 0–4)
RETICS #: 113 K/UL — SIGNIFICANT CHANGE UP (ref 25–125)
RETICS/RBC NFR: 4.5 % — HIGH (ref 0.5–2.5)
SODIUM SERPL-SCNC: 133 MMOL/L — LOW (ref 135–145)
SP GR SPEC: 1.02 — SIGNIFICANT CHANGE UP (ref 1–1.03)
SQUAMOUS # UR AUTO: 0 /HPF — SIGNIFICANT CHANGE UP (ref 0–5)
URATE SERPL-MCNC: 7.8 MG/DL — SIGNIFICANT CHANGE UP (ref 3.4–8.8)
UROBILINOGEN FLD QL: 0.2 MG/DL — SIGNIFICANT CHANGE UP (ref 0.2–1)
WBC # BLD: 16.15 K/UL — HIGH (ref 3.8–10.5)
WBC # FLD AUTO: 16.15 K/UL — HIGH (ref 3.8–10.5)
WBC UR QL: 36 /HPF — HIGH (ref 0–5)

## 2025-04-06 PROCEDURE — ZZZZZ: CPT

## 2025-04-06 PROCEDURE — 99233 SBSQ HOSP IP/OBS HIGH 50: CPT | Mod: GC

## 2025-04-06 PROCEDURE — 74150 CT ABDOMEN W/O CONTRAST: CPT | Mod: 26

## 2025-04-06 PROCEDURE — 99291 CRITICAL CARE FIRST HOUR: CPT | Mod: GC

## 2025-04-06 RX ORDER — SODIUM CHLORIDE 9 G/1000ML
1000 INJECTION, SOLUTION INTRAVENOUS
Refills: 0 | Status: DISCONTINUED | OUTPATIENT
Start: 2025-04-06 | End: 2025-04-07

## 2025-04-06 RX ORDER — MIDODRINE HYDROCHLORIDE 5 MG/1
10 TABLET ORAL EVERY 8 HOURS
Refills: 0 | Status: DISCONTINUED | OUTPATIENT
Start: 2025-04-06 | End: 2025-04-07

## 2025-04-06 RX ADMIN — Medication 650 MILLIGRAM(S): at 13:59

## 2025-04-06 RX ADMIN — HEPARIN SODIUM 5000 UNIT(S): 1000 INJECTION INTRAVENOUS; SUBCUTANEOUS at 13:59

## 2025-04-06 RX ADMIN — DIAZEPAM 10 MILLIGRAM(S): 2 TABLET ORAL at 05:32

## 2025-04-06 RX ADMIN — FOLIC ACID 1 MILLIGRAM(S): 1 TABLET ORAL at 12:09

## 2025-04-06 RX ADMIN — LEVETIRACETAM 500 MILLIGRAM(S): 10 INJECTION, SOLUTION INTRAVENOUS at 05:32

## 2025-04-06 RX ADMIN — Medication 1 APPLICATION(S): at 18:05

## 2025-04-06 RX ADMIN — HEPARIN SODIUM 5000 UNIT(S): 1000 INJECTION INTRAVENOUS; SUBCUTANEOUS at 05:33

## 2025-04-06 RX ADMIN — Medication 650 MILLIGRAM(S): at 21:28

## 2025-04-06 RX ADMIN — Medication 1 APPLICATION(S): at 05:33

## 2025-04-06 RX ADMIN — MIDODRINE HYDROCHLORIDE 10 MILLIGRAM(S): 5 TABLET ORAL at 17:37

## 2025-04-06 RX ADMIN — IPRATROPIUM BROMIDE AND ALBUTEROL SULFATE 3 MILLILITER(S): .5; 2.5 SOLUTION RESPIRATORY (INHALATION) at 20:04

## 2025-04-06 RX ADMIN — DOXAZOSIN MESYLATE 1 MILLIGRAM(S): 8 TABLET ORAL at 21:28

## 2025-04-06 RX ADMIN — IPRATROPIUM BROMIDE AND ALBUTEROL SULFATE 3 MILLILITER(S): .5; 2.5 SOLUTION RESPIRATORY (INHALATION) at 08:15

## 2025-04-06 RX ADMIN — PROPOFOL 26.6 MICROGRAM(S)/KG/MIN: 10 INJECTION, EMULSION INTRAVENOUS at 19:22

## 2025-04-06 RX ADMIN — IPRATROPIUM BROMIDE AND ALBUTEROL SULFATE 3 MILLILITER(S): .5; 2.5 SOLUTION RESPIRATORY (INHALATION) at 15:27

## 2025-04-06 RX ADMIN — PROPOFOL 26.6 MICROGRAM(S)/KG/MIN: 10 INJECTION, EMULSION INTRAVENOUS at 12:09

## 2025-04-06 RX ADMIN — MEROPENEM 100 MILLIGRAM(S): 1 INJECTION INTRAVENOUS at 14:59

## 2025-04-06 RX ADMIN — MIDODRINE HYDROCHLORIDE 10 MILLIGRAM(S): 5 TABLET ORAL at 09:47

## 2025-04-06 RX ADMIN — Medication 650 MILLIGRAM(S): at 05:32

## 2025-04-06 RX ADMIN — DIAZEPAM 10 MILLIGRAM(S): 2 TABLET ORAL at 21:28

## 2025-04-06 RX ADMIN — HEPARIN SODIUM 5000 UNIT(S): 1000 INJECTION INTRAVENOUS; SUBCUTANEOUS at 21:27

## 2025-04-06 RX ADMIN — LEVETIRACETAM 500 MILLIGRAM(S): 10 INJECTION, SOLUTION INTRAVENOUS at 17:37

## 2025-04-06 RX ADMIN — DIAZEPAM 10 MILLIGRAM(S): 2 TABLET ORAL at 13:58

## 2025-04-06 RX ADMIN — Medication 15 MILLILITER(S): at 05:33

## 2025-04-06 RX ADMIN — SODIUM CHLORIDE 500 MILLILITER(S): 9 INJECTION, SOLUTION INTRAVENOUS at 12:45

## 2025-04-06 RX ADMIN — Medication 1 APPLICATION(S): at 13:17

## 2025-04-06 RX ADMIN — IPRATROPIUM BROMIDE AND ALBUTEROL SULFATE 3 MILLILITER(S): .5; 2.5 SOLUTION RESPIRATORY (INHALATION) at 02:46

## 2025-04-06 RX ADMIN — Medication 15 MILLILITER(S): at 17:37

## 2025-04-06 NOTE — CHART NOTE - NSCHARTNOTEFT_GEN_A_CORE
HPI: 44M Sickle cell dz, iron overload, Gout, HTN, RUE DVT (12/2024) on Eliquis s/p L urethroscopy, stent, and biopsy c/b PEA and hemorrhage s/p L renal embolization (3/20/25) c/b global anoxic brain injury s/p trach 4/4. IR consulted for PEG placement. No safe window for endoscopic placement as per GI.    -- Case discussed with IR attending Dr. Wheeler.  -- CT abdomen 3/6 reviewed and no safe percutaneous window for gastrostomy tube placement.  -- Given no safe window for endoscopic placement, recommend surgical consultation.    Didi Guzman D.O.  Radiology Resident, PGY-5     -Available on [employer] TEAMS for all non-urgent questions  -Emergent issues: [facility]-p.[phone]; [facility]-p.[phone] ([phone])  -Non-emergent consults: Please place a [place] order "Consult-Interventional Radiology" with an appropriate callback number  -Scheduling questions: [facility]: [phone]; [facility]: [phone]  -Clinic/Outpatient booking: [facility]: [phone]; [facility]: [phone] HPI: 44M Sickle cell dz, iron overload, Gout, HTN, RUE DVT (12/2024) on Eliquis s/p L urethroscopy, stent, and biopsy c/b PEA and hemorrhage s/p L renal embolization (3/20/25) c/b global anoxic brain injury s/p trach 4/4. IR consulted for PEG placement. No safe window for endoscopic placement as per GI.    -- Case discussed with IR attending Dr. Wheeler.  -- CT abdomen 3/6 reviewed and no safe percutaneous window for gastrostomy tube placement given overlying bowel and liver.  -- Given no safe window for endoscopic placement, recommend surgical consultation.  -- Discussed with MICU Dr. Stevenson.    Didi Guzman D.O.  Radiology Resident, PGY-5     -Available on [employer] TEAMS for all non-urgent questions  -Emergent issues: [facility]-p.[phone]; [facility]-p.[phone] ([phone])  -Non-emergent consults: Please place a [place] order "Consult-Interventional Radiology" with an appropriate callback number  -Scheduling questions: [facility]: [phone]; [facility]: [phone]  -Clinic/Outpatient booking: [facility]: [phone]; [facility]: [phone]

## 2025-04-06 NOTE — PROGRESS NOTE ADULT - SUBJECTIVE AND OBJECTIVE BOX
MICU PROGRESS NOTE     HISTORY OF PRESENT ILLNESS     Overnight    Seen and examined at bedside, patient remains unable to engage meaningfully.       ROS: All negative except as listed above.    VITAL SIGNS:  ICU Vital Signs Last 24 Hrs  T(C): 36.2 (05 Apr 2025 04:00), Max: 36.8 (04 Apr 2025 08:00)  T(F): 97.2 (05 Apr 2025 04:00), Max: 98.2 (04 Apr 2025 08:00)  HR: 109 (05 Apr 2025 07:44) (91 - 114)  BP: 135/86 (05 Apr 2025 07:00) (93/56 - 141/89)  BP(mean): 100 (05 Apr 2025 07:00) (66 - 104)  ABP: --  ABP(mean): --  RR: 22 (05 Apr 2025 07:00) (19 - 23)  SpO2: 92% (05 Apr 2025 07:44) (92% - 100%)    O2 Parameters below as of 05 Apr 2025 07:00  Patient On (Oxygen Delivery Method): ventilator    O2 Concentration (%): 30      Mode: AC/ CMV (Assist Control/ Continuous Mandatory Ventilation), RR (machine): 20, TV (machine): 460, FiO2: 30, PEEP: 10, ITime: 1, MAP: 17, PIP: 37  Plateau pressure:   P/F ratio:     04-04 @ 07:01  -  04-05 @ 07:00  --------------------------------------------------------  IN: 1791.8 mL / OUT: 375 mL / NET: 1416.8 mL      CAPILLARY BLOOD GLUCOSE          ECG: reviewed.    PHYSICAL EXAM:    General: NAD, normal habitus, mild jaundice   Neuro: AAOx0,sedated non responsive to noxious stimuli, closed eyes, no verbal sounds, 4 mm pupils, sluggishly reactive pupils b/l   HEENT: icteric sclerae b/, trachoestomy clean/dry/intact  Chest: nonTTP   Heart: S1/S2, RRR   Lungs: CTA b/l, on MV   Abd: soft, nonTTP, nondistended   Ext: generalized trace pitting edema, LUE wrapped, nodule on R 1st digit proximal phalanges   Pulses: radial 2+ b/l, dorsalis pedis 2+ b/l   Lines/tubes/drains: L femoral paulaley, NGT, condom cath, , flexicel   Psych: unable to assess     MEDICATIONS:  MEDICATIONS  (STANDING):  albuterol/ipratropium for Nebulization 3 milliLiter(s) Nebulizer every 6 hours  allopurinol 50 milliGRAM(s) Oral <User Schedule>  chlorhexidine 0.12% Liquid 15 milliLiter(s) Oral Mucosa every 12 hours  chlorhexidine 2% Cloths 1 Application(s) Topical daily  doxazosin 1 milliGRAM(s) Oral at bedtime  folic acid 1 milliGRAM(s) Oral daily  influenza   Vaccine 0.5 milliLiter(s) IntraMuscular once  levETIRAcetam  Solution 500 milliGRAM(s) Oral two times a day  meropenem  IVPB 500 milliGRAM(s) IV Intermittent every 24 hours  petrolatum Ophthalmic Ointment 1 Application(s) Both EYES two times a day  propofol Infusion 50 MICROgram(s)/kG/Min (26.6 mL/Hr) IV Continuous <Continuous>  sodium bicarbonate 650 milliGRAM(s) Oral three times a day    MEDICATIONS  (PRN):      ALLERGIES:  Allergies    penicillin (Pruritus)  hydroxyurea (Other)  piperacillin-tazobactam (Urticaria)  ceftriaxone (Anaphylaxis)    Intolerances        LABS:                        6.9    16.78 )-----------( 323      ( 05 Apr 2025 01:42 )             18.9     04-05    133[L]  |  93[L]  |  74[H]  ----------------------------<  108[H]  3.5   |  18[L]  |  3.54[H]    Ca    9.0      05 Apr 2025 01:02  Phos  8.3     04-05  Mg     2.10     04-05    TPro  7.0  /  Alb  2.6[L]  /  TBili  15.8[H]  /  DBili  14.3[H]  /  AST  116[H]  /  ALT  29  /  AlkPhos  250[H]  04-05    PT/INR - ( 05 Apr 2025 01:17 )   PT: 11.6 sec;   INR: 0.97 ratio         PTT - ( 05 Apr 2025 01:17 )  PTT:23.3 sec  Urinalysis Basic - ( 05 Apr 2025 01:02 )    Color: x / Appearance: x / SG: x / pH: x  Gluc: 108 mg/dL / Ketone: x  / Bili: x / Urobili: x   Blood: x / Protein: x / Nitrite: x   Leuk Esterase: x / RBC: x / WBC x   Sq Epi: x / Non Sq Epi: x / Bacteria: x      ABG:      vBG:    Micro:    Culture - Blood (collected 03-20-25 @ 10:04)  Source: Blood Blood-Peripheral  Final Report (03-25-25 @ 13:01):    No growth at 5 days        Culture - Sputum (collected 03-26-25 @ 11:45)  Source: ET Tube ET Tube  Gram Stain (03-26-25 @ 22:28):    Few Squamous epithelial cells per low power field    Moderate polymorphonuclear leukocytes per low power field    Few Yeast per oil power field  Final Report (03-28-25 @ 08:15):    Commensal lay consistent with body site    Culture - Sputum (collected 03-22-25 @ 17:15)  Source: ET Tube ET Tube  Gram Stain (03-22-25 @ 23:42):    Numerous polymorphonuclear leukocytes per low power field    Rare Squamous epithelial cells per low power field    No organisms seen per oil power field  Final Report (03-24-25 @ 06:52):    Commensal lay consistent with body site        RADIOLOGY & ADDITIONAL TESTS: Reviewed. MICU PROGRESS NOTE     HISTORY OF PRESENT ILLNESS     No overnight events.   Hypotensive in the AM. Added midodrine 10mg TID.  Sedated and intubated.    ROS: All negative except as listed above.    VITAL SIGNS:  ICU Vital Signs Last 24 Hrs  T(C): 36.2 (05 Apr 2025 04:00), Max: 36.8 (04 Apr 2025 08:00)  T(F): 97.2 (05 Apr 2025 04:00), Max: 98.2 (04 Apr 2025 08:00)  HR: 109 (05 Apr 2025 07:44) (91 - 114)  BP: 135/86 (05 Apr 2025 07:00) (93/56 - 141/89)  BP(mean): 100 (05 Apr 2025 07:00) (66 - 104)  ABP: --  ABP(mean): --  RR: 22 (05 Apr 2025 07:00) (19 - 23)  SpO2: 92% (05 Apr 2025 07:44) (92% - 100%)    O2 Parameters below as of 05 Apr 2025 07:00  Patient On (Oxygen Delivery Method): ventilator    O2 Concentration (%): 30      Mode: AC/ CMV (Assist Control/ Continuous Mandatory Ventilation), RR (machine): 20, TV (machine): 460, FiO2: 30, PEEP: 10, ITime: 1, MAP: 17, PIP: 37  Plateau pressure:   P/F ratio:     04-04 @ 07:01  -  04-05 @ 07:00  --------------------------------------------------------  IN: 1791.8 mL / OUT: 375 mL / NET: 1416.8 mL      CAPILLARY BLOOD GLUCOSE          ECG: reviewed.    PHYSICAL EXAM:    General: NAD, normal habitus, mild jaundice   Neuro: AAOx0, sedated non responsive to noxious stimuli, closed eyes, no verbal sounds, 4 mm pupils, sluggishly reactive pupils b/l   HEENT: icteric sclerae b/, trachoestomy clean/dry/intact  Chest: nonTTP   Heart: S1/S2, RRR   Lungs: CTA b/l, on MV   Abd: soft, nonTTP, nondistended   Ext: generalized trace pitting edema, LUE wrapped, nodule on R 1st digit proximal phalanges   Pulses: radial 2+ b/l, dorsalis pedis 2+ b/l   Lines/tubes/drains: L femoral shiley, NGT, condom cath, , flexicel   Psych: unable to assess     MEDICATIONS:  MEDICATIONS  (STANDING):  albuterol/ipratropium for Nebulization 3 milliLiter(s) Nebulizer every 6 hours  allopurinol 50 milliGRAM(s) Oral <User Schedule>  chlorhexidine 0.12% Liquid 15 milliLiter(s) Oral Mucosa every 12 hours  chlorhexidine 2% Cloths 1 Application(s) Topical daily  doxazosin 1 milliGRAM(s) Oral at bedtime  folic acid 1 milliGRAM(s) Oral daily  influenza   Vaccine 0.5 milliLiter(s) IntraMuscular once  levETIRAcetam  Solution 500 milliGRAM(s) Oral two times a day  meropenem  IVPB 500 milliGRAM(s) IV Intermittent every 24 hours  petrolatum Ophthalmic Ointment 1 Application(s) Both EYES two times a day  propofol Infusion 50 MICROgram(s)/kG/Min (26.6 mL/Hr) IV Continuous <Continuous>  sodium bicarbonate 650 milliGRAM(s) Oral three times a day    MEDICATIONS  (PRN):      ALLERGIES:  Allergies    penicillin (Pruritus)  hydroxyurea (Other)  piperacillin-tazobactam (Urticaria)  ceftriaxone (Anaphylaxis)    Intolerances        LABS:                        6.9    16.78 )-----------( 323      ( 05 Apr 2025 01:42 )             18.9     04-05    133[L]  |  93[L]  |  74[H]  ----------------------------<  108[H]  3.5   |  18[L]  |  3.54[H]    Ca    9.0      05 Apr 2025 01:02  Phos  8.3     04-05  Mg     2.10     04-05    TPro  7.0  /  Alb  2.6[L]  /  TBili  15.8[H]  /  DBili  14.3[H]  /  AST  116[H]  /  ALT  29  /  AlkPhos  250[H]  04-05    PT/INR - ( 05 Apr 2025 01:17 )   PT: 11.6 sec;   INR: 0.97 ratio         PTT - ( 05 Apr 2025 01:17 )  PTT:23.3 sec  Urinalysis Basic - ( 05 Apr 2025 01:02 )    Color: x / Appearance: x / SG: x / pH: x  Gluc: 108 mg/dL / Ketone: x  / Bili: x / Urobili: x   Blood: x / Protein: x / Nitrite: x   Leuk Esterase: x / RBC: x / WBC x   Sq Epi: x / Non Sq Epi: x / Bacteria: x      ABG:      vBG:    Micro:    Culture - Blood (collected 03-20-25 @ 10:04)  Source: Blood Blood-Peripheral  Final Report (03-25-25 @ 13:01):    No growth at 5 days        Culture - Sputum (collected 03-26-25 @ 11:45)  Source: ET Tube ET Tube  Gram Stain (03-26-25 @ 22:28):    Few Squamous epithelial cells per low power field    Moderate polymorphonuclear leukocytes per low power field    Few Yeast per oil power field  Final Report (03-28-25 @ 08:15):    Commensal lay consistent with body site    Culture - Sputum (collected 03-22-25 @ 17:15)  Source: ET Tube ET Tube  Gram Stain (03-22-25 @ 23:42):    Numerous polymorphonuclear leukocytes per low power field    Rare Squamous epithelial cells per low power field    No organisms seen per oil power field  Final Report (03-24-25 @ 06:52):    Commensal lay consistent with body site        RADIOLOGY & ADDITIONAL TESTS: Reviewed.

## 2025-04-06 NOTE — PROGRESS NOTE ADULT - PROBLEM SELECTOR PLAN 1
Pt w/ oliguric MATA iso hemorrhagic shock and PEA arrest. Likely ATN. UA (3/16) w/ proteinuria and hematuria (21 RBC). Renal US (3/20) w/o hydronephrosis, and w/ large left renal subcapsular hematoma. CXR (3/22) w/ b/l hazy opacities. MRI w/ global hypoxic injury.   -NTDC placed and underwent HD on 3/24 iso worsening oliguric renal failure, w/ associated hyperkalemia and hypervolemia. Pt was anuric despite prior diuretic challenge. IJ NTDC removed 3/31 after HD on 3/29 as pt was non-oliguric and Cr plateaued to ~5.1-5.4. However, labs on 4/3 revealed elevated  and acidotic SCO2 14. Since pt planned for trach/PEG on 4/4, HD was done on 4/3 to optimize him for trach/peg (4/4) via new right femoral NTDC placed on 4/2.   -Pt now non-oliguric, UOP 690cc/24hr. Cr worse to 3.88, BUN 93, SCO2 16, K 3.7. No urgency for dialysis at this time. Will monitor for now  -Transfuse as needed.      Eldon Hamilton  Nephrology Fellow  Feel free to contact me on TEAMS  After 5 pm and on weekends please contact the on-call Fellow.

## 2025-04-06 NOTE — PROGRESS NOTE ADULT - ATTENDING COMMENTS
MATA - FIO2 30%. no hyperkalemia. no imminent need for dialysis but will monitor.  See above discussion.

## 2025-04-06 NOTE — PROGRESS NOTE ADULT - ASSESSMENT
Patient is 44y Male with PMHx of sickle cell disease admitted for anemia concerning for sickle cell crisis s/p ureteroscopy w/ L kidney biopsy 3/19, post-operative course c/b acute hemorrhagic shock, PEA arrest with ROSC; currently with acute renal failure on HD and global anoxic brain injury and urothelial cancer; s/p tracheostomy pending PEG palcement evaluation.       NEURO:  #Global anoxic brain injury   AAOx0. On sedation with no further myoclonus Guarded prognosis.   - Wean propofol   - D/c fenatnyl   - S/p versed gtt, weaned off 3/23  - Repeat CT B 3/24 showing diffuse sulcal effacement with increased intracrnail pressure, and few patchy foci of cortical blurring   - 3/25 MRIB with diffuse global abnormal supratentorial cortical restricted diffusion consistent with global hypoxic injury       #Seizures  No further myoclonus of upper body after restarting propofol.   - Witness myoclonic jerking concerning for seizures iso anoxic brain injury  - s/p 2.5g keppra load  - vEEG report: "Abundant myoclonic seizures in the setting of a severe diffuse/multifocal cerebral dysfunction which can be seen in the setting of sedative effect or due to an anoxic injury, with improvement after 20:50 suggesting a lowering of sedation"; will f/u with neuro recommendations  -C/w levetiracetam 500 mg po BID   - Trend enolase   - repeat vEEG 3/24 showing severe cerebral dysfunction   -C/w midazolam 2mg IV q8 PRN   -Initiate diazepam 10 mg po TID   -Reach out to neurology about long-term myoclonus suppressive medication    CV:  #Shock - hemorrhagic shock s/p kidney biopsy s/p 5u pRBC, 3 plasma, 3 plt- RESOLVED   Hemodynamically stable.   last dose of lovenox AC on 3/18 at 5PM   Hgb 8 -> ~3   - Not on levophed, MAP goal>65  - Monitor cbc q24      #Hx of VTE (R IJ thrombus, L brachial DVT)  - CTM, hold AC given hemorrhage  - Bullae present on arm with DVT,  - Appreciate orthopedics hand surgery consult, not compartment syndrome       PULM:  #Acute hypoxic respiratory failure  #Pna- 2/2 ventilator related   #Tracheostomy   In the setting of PEA arrest. Triggering ventilator on CPAP. Still requiring breathing support.   CXR with R lung field and left mid and lower lung field hazy opacities.  - Wean vent as able  - HTS nebs, duonebs  - 3/28 CXR showing increasing RUL consolidation   -Empiric abx for pna   -4/4 bedside tracheostomy       RENAL:  #Acute renal failure.   #Renal mass s/p biopsy  #Acidosis- RESOLVED   Oliguric. Remains hypervolemic.    - TOV 3/28, failed; replaced daniel on 4/1; TOV 4/3   - C/w doxazoin 1 mg po qhs to optimize subsequent TOV trial   - Trend Cr, uop, lytes  - S/p bicarb drip  - Per nephro, HD session #1 3/24, HD sessions #2 3/25, HD session #3 3/27; planning HD #4 3/29; 4/3 HD #5; will monitor off HD   -C/w sodium bicarbonate 650 mg po TID   - 3/31 removed L IJV shiley; 4/2 placed R fem shiley    - Per pathology result of renal biopsy, noninvasive urothelial malignancy             GI:  #Shock liver  Unchanged  LFTs. Jaundice with hyperbilirubinemia (direct).   - Trend liver enzymes  - RUQ US showing enlarged periportal and periaortic enlarged lymph nodes and enlarged left lateral liver lobe and CBD 9 mm.       #Diet:  - NPO w/ tube feed   - GI unable to place PEG due to hepatomegaly  - Per IR consult, need CT abdomen without contrast to assess windows; if unable to, may need to consider gen-surg consult     HEMATOLOGIC/ONCOLGOGIC   #Sickle cell crisis  #Acute blood loss anemia- 2/2 sickle cells and uremia vs critical illness   Downtrending direct hyperbilirubinemia,  gradually. Stable Hb. Increasing reticulocyte count.   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 CXR showing increased RUL opacification.   - D/c  aztreonam 2000 mg IV qd ( started 3/22- 3/27)   - C/w meropenem 500 mg IV qd (started 3/27-3/31); (4/2-)   - S/p vancomycin 1500 mg IV qd (4/2); 2nd dose 4/4, 3rd dose 4/5  - 4/2- CXR showing increasing RUL consolidation    MSK:  #infiltration  Infiltration of sodium bicarb into left arm, now with arm distension and bullae. Elevated uric acid with nodule suggegstive of podagra however no signs of active gout flare.   -Appreciate orthopedic hand surgery, not compartment syndrome   - CTM    Ethics: Full Code   -S/p trach and planning for PEG    Patient is 44y Male with PMHx of sickle cell disease admitted for anemia concerning for sickle cell crisis s/p ureteroscopy w/ L kidney biopsy 3/19, post-operative course c/b acute hemorrhagic shock, PEA arrest with ROSC; currently with acute renal failure on HD and global anoxic brain injury and urothelial cancer; s/p tracheostomy pending PEG palcement evaluation.       NEURO:  #Global anoxic brain injury   AAOx0. On sedation with no further myoclonus Guarded prognosis.   - Wean propofol   - D/c fenatnyl   - S/p versed gtt, weaned off 3/23  - Repeat CT B 3/24 showing diffuse sulcal effacement with increased intracrnail pressure, and few patchy foci of cortical blurring   - 3/25 MRIB with diffuse global abnormal supratentorial cortical restricted diffusion consistent with global hypoxic injury       #Seizures  No further myoclonus of upper body after restarting propofol.   - Witness myoclonic jerking concerning for seizures iso anoxic brain injury  - s/p 2.5g keppra load  - vEEG report: "Abundant myoclonic seizures in the setting of a severe diffuse/multifocal cerebral dysfunction which can be seen in the setting of sedative effect or due to an anoxic injury, with improvement after 20:50 suggesting a lowering of sedation"; will f/u with neuro recommendations  -C/w levetiracetam 500 mg po BID   - Trend enolase   - repeat vEEG 3/24 showing severe cerebral dysfunction   -C/w midazolam 2mg IV q8 PRN   -Initiate diazepam 10 mg po TID   -Reach out to neurology about long-term myoclonus suppressive medication    CV:  #Shock - hemorrhagic shock s/p kidney biopsy s/p 5u pRBC, 3 plasma, 3 plt- RESOLVED   Hemodynamically stable.   last dose of lovenox AC on 3/18 at 5PM   Hgb 8 -> ~3   - Not on levophed, MAP goal>65  - Monitor cbc q24  - started midodrine 10mg TID     #Hx of VTE (R IJ thrombus, L brachial DVT)  - CTM, hold AC given hemorrhage  - Bullae present on arm with DVT,  - Appreciate orthopedics hand surgery consult, not compartment syndrome       PULM:  #Acute hypoxic respiratory failure  #Pna- 2/2 ventilator related   #Tracheostomy   In the setting of PEA arrest. Triggering ventilator on CPAP. Still requiring breathing support.   CXR with R lung field and left mid and lower lung field hazy opacities.  - Wean vent as able  - HTS nebs, duonebs  - 3/28 CXR showing increasing RUL consolidation   -Empiric abx for pna   -4/4 bedside tracheostomy       RENAL:  #Acute renal failure.   #Renal mass s/p biopsy  #Acidosis- RESOLVED   Oliguric. Remains hypervolemic.    - TOV 3/28, failed; replaced daniel on 4/1; TOV 4/3   - C/w doxazoin 1 mg po qhs to optimize subsequent TOV trial   - Trend Cr, uop, lytes  - S/p bicarb drip  - Per nephro, HD session #1 3/24, HD sessions #2 3/25, HD session #3 3/27; planning HD #4 3/29; 4/3 HD #5; will monitor off HD   -C/w sodium bicarbonate 650 mg po TID   - 3/31 removed L IJV shiley; 4/2 placed R fem shiley    - Per pathology result of renal biopsy, noninvasive urothelial malignancy         GI:  #Shock liver  Unchanged  LFTs. Jaundice with hyperbilirubinemia (direct).   - Trend liver enzymes  - RUQ US showing enlarged periportal and periaortic enlarged lymph nodes and enlarged left lateral liver lobe and CBD 9 mm.       #Diet:  - NPO w/ tube feed   - GI unable to place PEG due to hepatomegaly  - Per IR consult, need CT abdomen without contrast to assess windows; if unable to, may need to consider gen-surg consult       HEMATOLOGIC/ONCOLGOGIC   #Sickle cell crisis  #Acute blood loss anemia- 2/2 sickle cells and uremia vs critical illness   Downtrending direct hyperbilirubinemia,  gradually. Stable Hb. Increasing reticulocyte count.   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 CXR showing increased RUL opacification.   - D/c  aztreonam 2000 mg IV qd ( started 3/22- 3/27)   - C/w 5d meropenem 500 mg IV qd (started 3/27-3/31); (4/2-)   - S/p vancomycin 1500 mg IV qd (4/2); 2nd dose 4/4, 3rd dose 4/5  - 4/2- CXR showing increasing RUL consolidation    MSK:  #infiltration  Infiltration of sodium bicarb into left arm, now with arm distension and bullae. Elevated uric acid with nodule suggegstive of podagra however no signs of active gout flare.   -Appreciate orthopedic hand surgery, not compartment syndrome   - CTM    Ethics: Full Code   -S/p trach and planning for PEG

## 2025-04-06 NOTE — PROGRESS NOTE ADULT - SUBJECTIVE AND OBJECTIVE BOX
[facility] DIVISION OF KIDNEY DISEASES AND HYPERTENSION   FOLLOW UP NOTE    --------------------------------------------------------------------------------  Chief Complaint:    24 hour events/subjective: Pt. was seen and examined today in MICU. Remains sedated.      PAST HISTORY  --------------------------------------------------------------------------------  No significant changes to PMH, PSH, FHx, SHx, unless otherwise noted    ALLERGIES & MEDICATIONS  --------------------------------------------------------------------------------  Allergies    penicillin (Pruritus)  hydroxyurea (Other)  piperacillin-tazobactam (Urticaria)  ceftriaxone (Anaphylaxis)    Intolerances      Standing Inpatient Medications  albuterol/ipratropium for Nebulization 3 milliLiter(s) Nebulizer every 6 hours  allopurinol 50 milliGRAM(s) Oral <User Schedule>  chlorhexidine 0.12% Liquid 15 milliLiter(s) Oral Mucosa every 12 hours  chlorhexidine 2% Cloths 1 Application(s) Topical daily  diazepam    Tablet 10 milliGRAM(s) Oral three times a day  doxazosin 1 milliGRAM(s) Oral at bedtime  folic acid 1 milliGRAM(s) Oral daily  heparin   Injectable 5000 Unit(s) SubCutaneous every 8 hours  influenza   Vaccine 0.5 milliLiter(s) IntraMuscular once  levETIRAcetam  Solution 500 milliGRAM(s) Oral two times a day  meropenem  IVPB 500 milliGRAM(s) IV Intermittent every 24 hours  midodrine 10 milliGRAM(s) Oral every 8 hours  petrolatum Ophthalmic Ointment 1 Application(s) Both EYES two times a day  propofol Infusion 50 MICROgram(s)/kG/Min IV Continuous <Continuous>  sodium bicarbonate 650 milliGRAM(s) Oral three times a day    PRN Inpatient Medications      REVIEW OF SYSTEMS  --------------------------------------------------------------------------------  unable to obtain due to clinic status    VITALS/PHYSICAL EXAM  --------------------------------------------------------------------------------  T(C): 36.7 (04-06-25 @ 08:00), Max: 36.7 (04-06-25 @ 08:00)  HR: 114 (04-06-25 @ 08:40) (99 - 115)  BP: 97/62 (04-06-25 @ 08:00) (93/57 - 128/77)  RR: 22 (04-06-25 @ 08:00) (20 - 24)  SpO2: 98% (04-06-25 @ 08:40) (90% - 100%)  Wt(kg): --        04-05-25 @ 07:01  -  04-06-25 @ 07:00  --------------------------------------------------------  IN: 1467.2 mL / OUT: 990 mL / NET: 477.2 mL    04-06-25 @ 07:01  -  04-06-25 @ 10:22  --------------------------------------------------------  IN: 42.6 mL / OUT: 30 mL / NET: 12.6 mL        Physical Exam:  	Gen: Ill appearing   	HEENT: Trached  	Pulm: CTA B/L  	CV: S1S2+  	Abd: Distended    	Ext: +edema  	Neuro: Sedated  	Skin: Warm and dry  	Dialysis access: Right femoral NTDC      LABS/STUDIES  --------------------------------------------------------------------------------              7.5    16.15 >-----------<  303      [04-06-25 @ 00:15]              20.3     133  |  94  |  93  ----------------------------<  118      [04-06-25 @ 00:15]  3.7   |  16  |  3.88        Ca     9.4     [04-06-25 @ 00:15]      iCa    1.15     [04-06 @ 00:15]      Mg     2.20     [04-06-25 @ 00:15]      Phos  8.7     [04-06-25 @ 00:15]    TPro  6.9  /  Alb  2.4  /  TBili  17.0  /  DBili  15.6  /  AST  145  /  ALT  37  /  AlkPhos  318  [04-06-25 @ 00:15]    PT/INR: PT 12.1 , INR 1.04       [04-06-25 @ 00:15]  PTT: 30.1       [04-06-25 @ 00:15]    Uric acid 7.8      [04-06-25 @ 00:15]        [04-06-25 @ 00:15]    Creatinine Trend:  SCr 3.88 [04-06 @ 00:15]  SCr 3.54 [04-05 @ 01:02]  SCr 3.06 [04-04 @ 00:35]  SCr 5.12 [04-03 @ 01:35]  SCr 5.38 [04-02 @ 01:12]    Urinalysis - [04-06-25 @ 00:32]      Color Dark Yellow / Appearance Cloudy / SG 1.018 / pH 5.5      Gluc Negative / Ketone Negative  / Bili Moderate / Urobili 0.2       Blood Large / Protein 100 / Leuk Est Large / Nitrite Negative      RBC 69 / WBC 36 / Hyaline  / Gran  / Sq Epi  / Non Sq Epi 0 / Bacteria Many      HBsAb 50.9      [03-29-25 @ 07:15]  HBsAg Nonreact      [03-17-25 @ 15:26]  HBcAb Nonreact      [03-29-25 @ 07:15]  HCV 0.10, Nonreact      [03-17-25 @ 15:26]  HIV Nonreact      [03-17-25 @ 15:26]      Tacrolimus  Cyclosporine  Sirolimus  Mycophenolate  BK PCR  CMV PCRCMVPCR Log: NotDetec [id number]/mL (12-27 @ 05:38)    Parvo PCR  EBV PCR

## 2025-04-06 NOTE — PROGRESS NOTE ADULT - ATTENDING COMMENTS
Patient is a 43 yo M w/ SSD, UE DVT, gout, HTN, with L renal mass admitted with acute anemia/ sickle cell crisis and evaluation of his left renal mass s/p left ureteroscopy and biopsy (3/19/25) with 24 hour post operative course complicated by rapid responses for hypoglycemia c/b cardiac arrest x 4 minutes CPR/1 epi with ROSC, transferred to MICU for further care, found to have anoxic brain injury. MRI showing anoxia. 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 Continue ventilatory support, s/p tracheostomy 4/4/25. Myoclonic jerk movements controlled on propofol; continue keppra, start valium to assist with weaning propofol. Pending CT Abd/P to evaluate for PEG palcement by IR. Hypotensive episode, IVFs 1 L LR bolus, start midodrine, anemia, check FOBT, continue broad spectrum antibiotics x 5 days.

## 2025-04-07 LAB
ALBUMIN SERPL ELPH-MCNC: 2.3 G/DL — LOW (ref 3.3–5)
ALP SERPL-CCNC: 337 U/L — HIGH (ref 40–120)
ALT FLD-CCNC: 37 U/L — SIGNIFICANT CHANGE UP (ref 4–41)
ANION GAP SERPL CALC-SCNC: 25 MMOL/L — HIGH (ref 7–14)
ANISOCYTOSIS BLD QL: SLIGHT — SIGNIFICANT CHANGE UP
APTT BLD: 30.5 SEC — SIGNIFICANT CHANGE UP (ref 24.5–35.6)
AST SERPL-CCNC: 147 U/L — HIGH (ref 4–40)
BASOPHILS # BLD AUTO: 0.31 K/UL — HIGH (ref 0–0.2)
BASOPHILS # BLD AUTO: 0.32 K/UL — HIGH (ref 0–0.2)
BASOPHILS NFR BLD AUTO: 1.7 % — SIGNIFICANT CHANGE UP (ref 0–2)
BASOPHILS NFR BLD AUTO: 1.8 % — SIGNIFICANT CHANGE UP (ref 0–2)
BILIRUB DIRECT SERPL-MCNC: 16 MG/DL — HIGH (ref 0–0.3)
BILIRUB INDIRECT FLD-MCNC: 2 MG/DL — HIGH (ref 0–1)
BILIRUB SERPL-MCNC: 17.8 MG/DL — HIGH (ref 0.2–1.2)
BILIRUB SERPL-MCNC: 18 MG/DL — HIGH (ref 0.2–1.2)
BLOOD GAS VENOUS COMPREHENSIVE RESULT: SIGNIFICANT CHANGE UP
BUN SERPL-MCNC: 104 MG/DL — HIGH (ref 7–23)
BURR CELLS BLD QL SMEAR: PRESENT — SIGNIFICANT CHANGE UP
CA-I BLD-SCNC: 1.15 MMOL/L — SIGNIFICANT CHANGE UP (ref 1.15–1.29)
CALCIUM SERPL-MCNC: 9.1 MG/DL — SIGNIFICANT CHANGE UP (ref 8.4–10.5)
CHLORIDE SERPL-SCNC: 94 MMOL/L — LOW (ref 98–107)
CO2 SERPL-SCNC: 14 MMOL/L — LOW (ref 22–31)
CREAT SERPL-MCNC: 4.22 MG/DL — HIGH (ref 0.5–1.3)
EGFR: 17 ML/MIN/1.73M2 — LOW
EGFR: 17 ML/MIN/1.73M2 — LOW
EOSINOPHIL # BLD AUTO: 0 K/UL — SIGNIFICANT CHANGE UP (ref 0–0.5)
EOSINOPHIL # BLD AUTO: 0.49 K/UL — SIGNIFICANT CHANGE UP (ref 0–0.5)
EOSINOPHIL NFR BLD AUTO: 0 % — SIGNIFICANT CHANGE UP (ref 0–6)
EOSINOPHIL NFR BLD AUTO: 2.6 % — SIGNIFICANT CHANGE UP (ref 0–6)
GIANT PLATELETS BLD QL SMEAR: PRESENT — SIGNIFICANT CHANGE UP
GIANT PLATELETS BLD QL SMEAR: PRESENT — SIGNIFICANT CHANGE UP
GLUCOSE SERPL-MCNC: 102 MG/DL — HIGH (ref 70–99)
HAPTOGLOB SERPL-MCNC: <20 MG/DL — LOW (ref 34–200)
HCT VFR BLD CALC: 16 % — CRITICAL LOW (ref 39–50)
HCT VFR BLD CALC: 18 % — CRITICAL LOW (ref 39–50)
HGB BLD-MCNC: 5.5 G/DL — CRITICAL LOW (ref 13–17)
HGB BLD-MCNC: 5.9 G/DL — CRITICAL LOW (ref 13–17)
IANC: 12.93 K/UL — HIGH (ref 1.8–7.4)
IANC: 14.33 K/UL — HIGH (ref 1.8–7.4)
INR BLD: 1.06 RATIO — SIGNIFICANT CHANGE UP (ref 0.85–1.16)
LDH SERPL L TO P-CCNC: 424 U/L — HIGH (ref 135–225)
LYMPHOCYTES # BLD AUTO: 1.35 K/UL — SIGNIFICANT CHANGE UP (ref 1–3.3)
LYMPHOCYTES # BLD AUTO: 1.64 K/UL — SIGNIFICANT CHANGE UP (ref 1–3.3)
LYMPHOCYTES # BLD AUTO: 7.9 % — LOW (ref 13–44)
LYMPHOCYTES # BLD AUTO: 8.7 % — LOW (ref 13–44)
MAGNESIUM SERPL-MCNC: 2.1 MG/DL — SIGNIFICANT CHANGE UP (ref 1.6–2.6)
MANUAL SMEAR VERIFICATION: SIGNIFICANT CHANGE UP
MCHC RBC-ENTMCNC: 29.5 PG — SIGNIFICANT CHANGE UP (ref 27–34)
MCHC RBC-ENTMCNC: 29.9 PG — SIGNIFICANT CHANGE UP (ref 27–34)
MCHC RBC-ENTMCNC: 30.6 G/DL — LOW (ref 32–36)
MCHC RBC-ENTMCNC: 36.9 G/DL — HIGH (ref 32–36)
MCV RBC AUTO: 77.2 FL — LOW (ref 80–100)
MCV RBC AUTO: 80 FL — SIGNIFICANT CHANGE UP (ref 80–100)
METAMYELOCYTES # FLD: 0.9 % — SIGNIFICANT CHANGE UP (ref 0–1)
METAMYELOCYTES NFR BLD: 0.9 % — SIGNIFICANT CHANGE UP (ref 0–1)
MICROCYTES BLD QL: SLIGHT — SIGNIFICANT CHANGE UP
MONOCYTES # BLD AUTO: 1.64 K/UL — HIGH (ref 0–0.9)
MONOCYTES # BLD AUTO: 1.8 K/UL — HIGH (ref 0–0.9)
MONOCYTES NFR BLD AUTO: 10.5 % — SIGNIFICANT CHANGE UP (ref 2–14)
MONOCYTES NFR BLD AUTO: 8.7 % — SIGNIFICANT CHANGE UP (ref 2–14)
MYELOCYTES NFR BLD: 0.9 % — HIGH (ref 0–0)
NEUTROPHILS # BLD AUTO: 13.67 K/UL — HIGH (ref 1.8–7.4)
NEUTROPHILS # BLD AUTO: 14.43 K/UL — HIGH (ref 1.8–7.4)
NEUTROPHILS NFR BLD AUTO: 76.5 % — SIGNIFICANT CHANGE UP (ref 43–77)
NEUTROPHILS NFR BLD AUTO: 79.8 % — HIGH (ref 43–77)
OVALOCYTES BLD QL SMEAR: SLIGHT — SIGNIFICANT CHANGE UP
PHOSPHATE SERPL-MCNC: 9.5 MG/DL — HIGH (ref 2.5–4.5)
PLAT MORPH BLD: NORMAL — SIGNIFICANT CHANGE UP
PLAT MORPH BLD: NORMAL — SIGNIFICANT CHANGE UP
PLATELET # BLD AUTO: 278 K/UL — SIGNIFICANT CHANGE UP (ref 150–400)
PLATELET # BLD AUTO: 293 K/UL — SIGNIFICANT CHANGE UP (ref 150–400)
PLATELET COUNT - ESTIMATE: NORMAL — SIGNIFICANT CHANGE UP
PLATELET COUNT - ESTIMATE: NORMAL — SIGNIFICANT CHANGE UP
POIKILOCYTOSIS BLD QL AUTO: SIGNIFICANT CHANGE UP
POIKILOCYTOSIS BLD QL AUTO: SIGNIFICANT CHANGE UP
POLYCHROMASIA BLD QL SMEAR: SLIGHT — SIGNIFICANT CHANGE UP
POLYCHROMASIA BLD QL SMEAR: SLIGHT — SIGNIFICANT CHANGE UP
POTASSIUM SERPL-MCNC: 4.2 MMOL/L — SIGNIFICANT CHANGE UP (ref 3.5–5.3)
POTASSIUM SERPL-SCNC: 4.2 MMOL/L — SIGNIFICANT CHANGE UP (ref 3.5–5.3)
PROT SERPL-MCNC: 6.8 G/DL — SIGNIFICANT CHANGE UP (ref 6–8.3)
PROTHROM AB SERPL-ACNC: 12.6 SEC — SIGNIFICANT CHANGE UP (ref 9.9–13.4)
RBC # BLD: 1.84 M/UL — LOW (ref 4.2–5.8)
RBC # BLD: 2 M/UL — LOW (ref 4.2–5.8)
RBC # BLD: 2 M/UL — LOW (ref 4.2–5.8)
RBC # FLD: 21.2 % — HIGH (ref 10.3–14.5)
RBC # FLD: 22 % — HIGH (ref 10.3–14.5)
RBC BLD AUTO: ABNORMAL
RBC BLD AUTO: ABNORMAL
RETICS #: 80.4 K/UL — SIGNIFICANT CHANGE UP (ref 25–125)
RETICS/RBC NFR: 4 % — HIGH (ref 0.5–2.5)
SCHISTOCYTES BLD QL AUTO: SIGNIFICANT CHANGE UP
SCHISTOCYTES BLD QL AUTO: SLIGHT — SIGNIFICANT CHANGE UP
SICKLE CELLS BLD QL SMEAR: SLIGHT — SIGNIFICANT CHANGE UP
SODIUM SERPL-SCNC: 133 MMOL/L — LOW (ref 135–145)
TARGETS BLD QL SMEAR: SIGNIFICANT CHANGE UP
TARGETS BLD QL SMEAR: SIGNIFICANT CHANGE UP
URATE SERPL-MCNC: 8.6 MG/DL — SIGNIFICANT CHANGE UP (ref 3.4–8.8)
WBC # BLD: 17.13 K/UL — HIGH (ref 3.8–10.5)
WBC # BLD: 18.86 K/UL — HIGH (ref 3.8–10.5)
WBC # FLD AUTO: 17.13 K/UL — HIGH (ref 3.8–10.5)
WBC # FLD AUTO: 18.86 K/UL — HIGH (ref 3.8–10.5)

## 2025-04-07 PROCEDURE — 99291 CRITICAL CARE FIRST HOUR: CPT | Mod: GC

## 2025-04-07 PROCEDURE — 99233 SBSQ HOSP IP/OBS HIGH 50: CPT | Mod: GC

## 2025-04-07 RX ORDER — BUMETANIDE 1 MG/1
2 TABLET ORAL ONCE
Refills: 0 | Status: COMPLETED | OUTPATIENT
Start: 2025-04-07 | End: 2025-04-07

## 2025-04-07 RX ORDER — DIAZEPAM 2 MG/1
20 TABLET ORAL THREE TIMES A DAY
Refills: 0 | Status: DISCONTINUED | OUTPATIENT
Start: 2025-04-07 | End: 2025-04-08

## 2025-04-07 RX ORDER — MIDODRINE HYDROCHLORIDE 5 MG/1
10 TABLET ORAL EVERY 8 HOURS
Refills: 0 | Status: DISCONTINUED | OUTPATIENT
Start: 2025-04-07 | End: 2025-04-12

## 2025-04-07 RX ADMIN — Medication 650 MILLIGRAM(S): at 22:10

## 2025-04-07 RX ADMIN — IPRATROPIUM BROMIDE AND ALBUTEROL SULFATE 3 MILLILITER(S): .5; 2.5 SOLUTION RESPIRATORY (INHALATION) at 16:04

## 2025-04-07 RX ADMIN — Medication 650 MILLIGRAM(S): at 13:20

## 2025-04-07 RX ADMIN — Medication 40 MILLIGRAM(S): at 18:03

## 2025-04-07 RX ADMIN — IPRATROPIUM BROMIDE AND ALBUTEROL SULFATE 3 MILLILITER(S): .5; 2.5 SOLUTION RESPIRATORY (INHALATION) at 19:31

## 2025-04-07 RX ADMIN — IPRATROPIUM BROMIDE AND ALBUTEROL SULFATE 3 MILLILITER(S): .5; 2.5 SOLUTION RESPIRATORY (INHALATION) at 02:48

## 2025-04-07 RX ADMIN — FOLIC ACID 1 MILLIGRAM(S): 1 TABLET ORAL at 13:19

## 2025-04-07 RX ADMIN — MIDODRINE HYDROCHLORIDE 10 MILLIGRAM(S): 5 TABLET ORAL at 13:19

## 2025-04-07 RX ADMIN — HEPARIN SODIUM 5000 UNIT(S): 1000 INJECTION INTRAVENOUS; SUBCUTANEOUS at 13:18

## 2025-04-07 RX ADMIN — Medication 1 APPLICATION(S): at 18:04

## 2025-04-07 RX ADMIN — DIAZEPAM 20 MILLIGRAM(S): 2 TABLET ORAL at 22:13

## 2025-04-07 RX ADMIN — Medication 15 MILLILITER(S): at 05:09

## 2025-04-07 RX ADMIN — Medication 650 MILLIGRAM(S): at 05:09

## 2025-04-07 RX ADMIN — Medication 15 MILLILITER(S): at 18:03

## 2025-04-07 RX ADMIN — MIDODRINE HYDROCHLORIDE 10 MILLIGRAM(S): 5 TABLET ORAL at 02:06

## 2025-04-07 RX ADMIN — Medication 1 APPLICATION(S): at 13:19

## 2025-04-07 RX ADMIN — LEVETIRACETAM 500 MILLIGRAM(S): 10 INJECTION, SOLUTION INTRAVENOUS at 05:09

## 2025-04-07 RX ADMIN — HEPARIN SODIUM 5000 UNIT(S): 1000 INJECTION INTRAVENOUS; SUBCUTANEOUS at 05:09

## 2025-04-07 RX ADMIN — PROPOFOL 26.6 MICROGRAM(S)/KG/MIN: 10 INJECTION, EMULSION INTRAVENOUS at 07:49

## 2025-04-07 RX ADMIN — MIDODRINE HYDROCHLORIDE 10 MILLIGRAM(S): 5 TABLET ORAL at 22:10

## 2025-04-07 RX ADMIN — DOXAZOSIN MESYLATE 1 MILLIGRAM(S): 8 TABLET ORAL at 22:10

## 2025-04-07 RX ADMIN — MEROPENEM 100 MILLIGRAM(S): 1 INJECTION INTRAVENOUS at 15:00

## 2025-04-07 RX ADMIN — Medication 1 APPLICATION(S): at 05:09

## 2025-04-07 RX ADMIN — Medication 80 MILLIGRAM(S): at 09:15

## 2025-04-07 RX ADMIN — DIAZEPAM 20 MILLIGRAM(S): 2 TABLET ORAL at 13:20

## 2025-04-07 RX ADMIN — IPRATROPIUM BROMIDE AND ALBUTEROL SULFATE 3 MILLILITER(S): .5; 2.5 SOLUTION RESPIRATORY (INHALATION) at 08:27

## 2025-04-07 RX ADMIN — LEVETIRACETAM 500 MILLIGRAM(S): 10 INJECTION, SOLUTION INTRAVENOUS at 18:03

## 2025-04-07 RX ADMIN — BUMETANIDE 2 MILLIGRAM(S): 1 TABLET ORAL at 13:18

## 2025-04-07 RX ADMIN — DIAZEPAM 10 MILLIGRAM(S): 2 TABLET ORAL at 05:09

## 2025-04-07 RX ADMIN — PROPOFOL 26.6 MICROGRAM(S)/KG/MIN: 10 INJECTION, EMULSION INTRAVENOUS at 22:19

## 2025-04-07 NOTE — PROGRESS NOTE ADULT - SUBJECTIVE AND OBJECTIVE BOX
INTERVAL HPI/OVERNIGHT EVENTS: Hgb dropped to 5.9 OVN. CTAP done with no window for endoscopic PEG tube placement by both IR and GI. No other events OVN,    SUBJECTIVE: Patient seen and examined at bedside.     ROS: All negative except as listed above.    Tele: NSR with no events.    VITAL SIGNS:  ICU Vital Signs Last 24 Hrs  T(C): 37 (07 Apr 2025 08:00), Max: 37.5 (06 Apr 2025 20:00)  T(F): 98.6 (07 Apr 2025 08:00), Max: 99.5 (06 Apr 2025 20:00)  HR: 109 (07 Apr 2025 08:00) (105 - 120)  BP: 113/59 (07 Apr 2025 08:00) (82/51 - 120/62)  BP(mean): 74 (07 Apr 2025 08:00) (62 - 81)  ABP: --  ABP(mean): --  RR: 28 (07 Apr 2025 08:00) (22 - 30)  SpO2: 95% (07 Apr 2025 08:00) (93% - 98%)    O2 Parameters below as of 07 Apr 2025 08:00  Patient On (Oxygen Delivery Method): ventilator    O2 Concentration (%): 40      Mode: AC/ CMV (Assist Control/ Continuous Mandatory Ventilation), RR (machine): 20, TV (machine): 460, FiO2: 40, PEEP: 6, ITime: 0.8, MAP: 13, PIP: 26  Plateau pressure:   P/F ratio:     04-06 @ 07:01 - 04-07 @ 07:00  --------------------------------------------------------  IN: 1385.2 mL / OUT: 1250 mL / NET: 135.2 mL    04-07 @ 07:01  -  04-07 @ 08:33  --------------------------------------------------------  IN: 13.3 mL / OUT: 0 mL / NET: 13.3 mL      CAPILLARY BLOOD GLUCOSE      POCT Blood Glucose.: 116 mg/dL (06 Apr 2025 13:06)      ECG: reviewed.    PHYSICAL EXAM:      General: NAD, normal habitus, mild jaundice   Neuro: AAOx0, sedated non responsive to noxious stimuli, closed eyes, no verbal sounds, 4 mm pupils, sluggishly reactive pupils b/l   HEENT: icteric sclerae b/, trachoestomy clean/dry/intact  Chest: nonTTP   Heart: S1/S2, RRR   Lungs: CTA b/l, on MV   Abd: soft, nonTTP, nondistended   Ext: generalized trace pitting edema, LUE wrapped, nodule on R 1st digit proximal phalanges   Pulses: radial 2+ b/l, dorsalis pedis 2+ b/l   Lines/tubes/drains: L femoral shiley, NGT, condom cath, , flexiseal  Psych: unable to assess       MEDICATIONS:  MEDICATIONS  (STANDING):  albuterol/ipratropium for Nebulization 3 milliLiter(s) Nebulizer every 6 hours  allopurinol 50 milliGRAM(s) Oral <User Schedule>  chlorhexidine 0.12% Liquid 15 milliLiter(s) Oral Mucosa every 12 hours  chlorhexidine 2% Cloths 1 Application(s) Topical daily  diazepam    Tablet 10 milliGRAM(s) Oral three times a day  doxazosin 1 milliGRAM(s) Oral at bedtime  folic acid 1 milliGRAM(s) Oral daily  heparin   Injectable 5000 Unit(s) SubCutaneous every 8 hours  influenza   Vaccine 0.5 milliLiter(s) IntraMuscular once  levETIRAcetam  Solution 500 milliGRAM(s) Oral two times a day  meropenem  IVPB 500 milliGRAM(s) IV Intermittent every 24 hours  midodrine 10 milliGRAM(s) Oral every 8 hours  pantoprazole  Injectable 80 milliGRAM(s) IV Push once  petrolatum Ophthalmic Ointment 1 Application(s) Both EYES two times a day  propofol Infusion 50 MICROgram(s)/kG/Min (26.6 mL/Hr) IV Continuous <Continuous>  sodium bicarbonate 650 milliGRAM(s) Oral three times a day    MEDICATIONS  (PRN):      ALLERGIES:  Allergies    penicillin (Pruritus)  hydroxyurea (Other)  piperacillin-tazobactam (Urticaria)  ceftriaxone (Anaphylaxis)    Intolerances        LABS:                        5.9    17.13 )-----------( 278      ( 07 Apr 2025 00:24 )             16.0     04-07    133[L]  |  94[L]  |  104[H]  ----------------------------<  102[H]  4.2   |  14[L]  |  4.22[H]    Ca    9.1      07 Apr 2025 00:24  Phos  9.5     04-07  Mg     2.10     04-07    TPro  6.8  /  Alb  2.3[L]  /  TBili  17.8[H]  /  DBili  16.0[H]  /  AST  147[H]  /  ALT  37  /  AlkPhos  337[H]  04-07    PT/INR - ( 07 Apr 2025 00:24 )   PT: 12.6 sec;   INR: 1.06 ratio         PTT - ( 07 Apr 2025 00:24 )  PTT:30.5 sec  Urinalysis Basic - ( 07 Apr 2025 00:24 )    Color: x / Appearance: x / SG: x / pH: x  Gluc: 102 mg/dL / Ketone: x  / Bili: x / Urobili: x   Blood: x / Protein: x / Nitrite: x   Leuk Esterase: x / RBC: x / WBC x   Sq Epi: x / Non Sq Epi: x / Bacteria: x      ABG:      vBG:  pH, Venous: 7.37 (04-07-25 @ 00:24)  pCO2, Venous: 33 mmHg (04-07-25 @ 00:24)  pO2, Venous: 50 mmHg (04-07-25 @ 00:24)  HCO3, Venous: 19 mmol/L (04-07-25 @ 00:24)    Micro:    Culture - Blood (collected 03-20-25 @ 10:04)  Source: Blood Blood-Peripheral  Final Report (03-25-25 @ 13:01):    No growth at 5 days        Culture - Sputum (collected 03-26-25 @ 11:45)  Source: ET Tube ET Tube  Gram Stain (03-26-25 @ 22:28):    Few Squamous epithelial cells per low power field    Moderate polymorphonuclear leukocytes per low power field    Few Yeast per oil power field  Final Report (03-28-25 @ 08:15):    Commensal lay consistent with body site    Culture - Sputum (collected 03-22-25 @ 17:15)  Source: ET Tube ET Tube  Gram Stain (03-22-25 @ 23:42):    Numerous polymorphonuclear leukocytes per low power field    Rare Squamous epithelial cells per low power field    No organisms seen per oil power field  Final Report (03-24-25 @ 06:52):    Commensal lay consistent with body site        RADIOLOGY & ADDITIONAL TESTS: Reviewed.

## 2025-04-07 NOTE — PROGRESS NOTE ADULT - PROBLEM SELECTOR PLAN 1
Pt w/ oliguric MAAT iso hemorrhagic shock and PEA arrest. Likely ATN. UA (3/16) w/ proteinuria and hematuria (21 RBC). Renal US (3/20) w/o hydronephrosis, and w/ large left renal subcapsular hematoma. CXR (3/22) w/ b/l hazy opacities. MRI w/ global hypoxic injury.   -NTDC placed and underwent HD on 3/24 iso worsening oliguric renal failure, w/ associated hyperkalemia and hypervolemia. Pt was anuric despite prior diuretic challenge. IJ NTDC removed 3/31 after HD on 3/29 as pt was non-oliguric and Cr plateaued to ~5.1-5.4. However, labs on 4/3 revealed elevated  and acidotic SCO2 14. Since pt planned for trach/PEG on 4/4, HD was done on 4/3 to optimize him for trach/peg (4/4) via new right femoral NTDC placed on 4/2.     -Pt now oliguric, UOP 450cc/24hr. Renal fx worse, Cr 4.22, , SCO2 14, pH 7.37, K 4.2. No urgency for dialysis at this time. Give bumex 2mg IV x 1 today.   -Transfuse as needed.      Eldon Hamilton  Nephrology Fellow  Feel free to contact me on TEAMS  After 5 pm and on weekends please contact the on-call Fellow. Pt w/ oliguric MATA iso hemorrhagic shock and PEA arrest. Likely ATN. UA (3/16) w/ proteinuria and hematuria (21 RBC). Renal US (3/20) w/o hydronephrosis, and w/ large left renal subcapsular hematoma. CXR (3/22) w/ b/l hazy opacities. MRI w/ global hypoxic injury.   -NTDC placed and underwent HD on 3/24 iso worsening oliguric renal failure, w/ associated hyperkalemia and hypervolemia. Pt was anuric despite prior diuretic challenge. IJ NTDC removed 3/31 after HD on 3/29 as pt was non-oliguric and Cr plateaued to ~5.1-5.4. However, labs on 4/3 revealed elevated  and acidotic SCO2 14. Since pt planned for trach/PEG on 4/4, HD was done on 4/3 to optimize him for trach/peg (4/4) via new right femoral NTDC placed on 4/2.     -Pt now oliguric, UOP 450cc/24hr. Renal fx worse, Cr 4.22, , SCO2 14, pH 7.37, K 4.2. No urgency for dialysis at this time. Give bumex 2mg IV x 1 today.   -Will need HD tomorrow, please place TDC as pt likely to need long term dialysis  -Transfuse as needed.      Eldon Hamilton  Nephrology Fellow  Feel free to contact me on TEAMS  After 5 pm and on weekends please contact the on-call Fellow.

## 2025-04-07 NOTE — CONSULT NOTE ADULT - ASSESSMENT
44M with history of sickle cell disease with multiple crises, urothelial ca, HTN, RUE DVT on Eliquis (held since 3/15), admitted for anemia on outpatient blood work. Underwent L ureteroscopy, L kidney mass biopsy, and stent placement 3/19/25 c/b perinephric hemorrhagic shock with PEA arrest, ROSC after 4 minutes however unclear anoxic time; s/p IR embolization of left renal artery 3/20.     Unclear prognosis regarding neurologic recovery, per discussion with family at bedside "he said if he would be a vegetable to pull the plug" but "as long as there is a chance do everything." Per GI and IR no safe window for percutaneous/endoscopic placement due to hepatomegaly. Surgery consulted for surgical feeding G or J tube placement.    Recs:  - Appreciate ongoing GOC: patient's family states unless patient would not have meaningful neurologic recovery he is full code/want everything including feeding access  - Please clarify patient's overall prognosis i/s/o SCD, urothelial CA, and anoxic brain injury; consider neuro reeval  - Please document optimization for possible OR  - If resuming full AC, prefer heparin gtt i/s/o possible OR planning  - Appreciate MICU care  - Surgery will follow    Discussed with Dr. Fernie GARCIA Team Surgery  Please page [phone] with any questions/concerns

## 2025-04-07 NOTE — PROGRESS NOTE ADULT - ASSESSMENT
Assessment and Plan: 	  Patient is 44y Male with PMHx of sickle cell disease admitted for anemia concerning for sickle cell crisis s/p ureteroscopy w/ L kidney biopsy 3/19, post-operative course c/b acute hemorrhagic shock, PEA arrest with ROSC; currently with acute renal failure on HD and global anoxic brain injury and urothelial cancer; s/p tracheostomy pending possible G/J tube placement by surgery given no safe endoscopic window for PEG placement.       NEURO:  #Global anoxic brain injury   AAOx0. On sedation with no further myoclonus. Guarded prognosis.   - Wean propofol as tolerated  - D/marjan fenatnyl   - S/p versed gtt, weaned off 3/23  - Repeat CT B 3/24 showing diffuse sulcal effacement with increased intracrnail pressure, and few patchy foci of cortical blurring   - 3/25 MRIB with diffuse global abnormal supratentorial cortical restricted diffusion consistent with global hypoxic injury   -CTM and wean off propofol as tolerated.    #Seizures  No further myoclonus of upper body after restarting propofol.   - Witness myoclonic jerking concerning for seizures iso anoxic brain injury  - s/p 2.5g keppra load & midazolam 2mg IV q8 PRN   - vEEG report: "Abundant myoclonic seizures in the setting of a severe diffuse/multifocal cerebral dysfunction which can be seen in the setting of sedative effect or due to an anoxic injury, with improvement after 20:50 suggesting a lowering of sedation"; will f/u with neuro recommendations. Repeat vEEG 3/24 showing severe cerebral dysfunction   -C/w levetiracetam 500 mg po BID & Diazepam 10 mg po TID   -Trend enolase   -Reach out to neurology about long-term myoclonus suppressive medication    CV:  #Shock - hemorrhagic shock s/p kidney biopsy s/p 5u pRBC, 3 plasma, 3 plt  last dose of lovenox AC on 3/18 at 5PM   Hgb 8 -> ~3.  - Not on levophed, MAP goal>65  - Monitor cbc q24  - Midodrine 10mg TID & trend CBC daily for Hg goal     #Hx of VTE (R IJ thrombus, L brachial DVT)  - CTM, hold AC given hemorrhage  - Bullae present on arm with DVT  - Appreciate orthopedics hand surgery consult, not compartment syndrome       PULM:  #Acute hypoxic respiratory failure  #Pna- 2/2 ventilation requirement  #Tracheostomy   In the setting of PEA arrest. Triggering ventilator on CPAP. Still requiring breathing support.   CXR with R lung field and left mid and lower lung field hazy opacities.  - Wean vent as able  - continue with duonebs Q6H & s/p HTS  - 3/28 CXR showing increasing RUL consolidation   -4/4 bedside tracheostomy   -S/p Abx completion course as below and currently on meropenem 500 mg Q24H (04/02 -)    RENAL:  #Acute renal failure.   #Renal mass s/p biopsy -   #Acidosis- RESOLVED   Oliguric. Remains hypervolemic.    - TOV 3/28, failed; replaced daniel on 4/1; TOV 4when possible  - C/w doxazoin 1 mg po qhs to optimize subsequent TOV trial   - Trend Cr, uop, lytes  - S/p bicarb drip  - Per nephro, HD session #1 3/24, HD sessions #2 3/25, HD session #3 3/27; planning HD #4 3/29; 4/3 HD #5; will monitor off HD. HD sessions per nephro  -C/w sodium bicarbonate 650 mg po TID   - 3/31 removed L IJV shiley; 4/2 placed R fem shiley    - Per pathology result of renal biopsy, noninvasive urothelial malignancy     GI:  #Shock liver  Unchanged  LFTs. Jaundice with hyperbilirubinemia (direct).   - Trend liver enzymes  - RUQ US showing enlarged periportal and periaortic enlarged lymph nodes and enlarged left lateral liver lobe and CBD 9 mm.       #Diet:  - NPO w/ tube feed   - GI unable to place PEG due to hepatomegaly.   - Per IR consult: Given no safe window for endoscopic placement, recommend surgical consultation.   -Will require surgical consult for enteral feeding tube placement      HEMATOLOGIC/ONCOLGOGIC   #Sickle cell crisis  #Acute blood loss anemia- 2/2 sickle cells and uremia vs critical illness   Downtrending direct hyperbilirubinemia,  gradually & Increasing reticulocyte count.   - Heme following   -D/c deferasirox due to current renal failure   -S/p 1 uPRBC 3/31, 1 uPRBC 4/2, 1 uPRBC 4/5,   - Transfuse as needed Hb >6 goal per Heme.    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 CXR showing increased RUL opacification.   - D/c  aztreonam 2000 mg IV qd ( started 3/22- 3/27)   - S/p vancomycin 1500 mg IV qd (4/2); 2nd dose 4/4, 3rd dose 4/5  - 4/2- CXR showing increasing RUL consolidation  - C/w 5d meropenem 500 mg IV qd (started 3/27-3/31); (4/2-)     MSK:  #infiltration  Infiltration of sodium bicarb into left arm, now with arm distension and bullae. Elevated uric acid with nodule suggegstive of podagra however no signs of active gout flare.   -Appreciate orthopedic hand surgery, not compartment syndrome   - CTM    Ethics: Full Code   Pending: S/p trach and planning for enteral tube placement for feeding. IR & GI with no window for placement requiring surgical consult.  Assessment and Plan: 	  Patient is 44y Male with PMHx of sickle cell disease admitted for anemia concerning for sickle cell crisis s/p ureteroscopy w/ L kidney biopsy 3/19, post-operative course c/b acute hemorrhagic shock, PEA arrest with ROSC; currently with acute renal failure on HD and global anoxic brain injury and urothelial cancer; s/p tracheostomy pending possible G/J tube placement by surgery given no safe endoscopic window for PEG placement.       NEURO:  #Global anoxic brain injury   AAOx0. On sedation with no further myoclonus. Guarded prognosis.   - Wean propofol as tolerated & increase valium to 20 mg TID  - D/marjan fenatnyl   - S/p versed gtt, weaned off 3/23  - Repeat CT B 3/24 showing diffuse sulcal effacement with increased intracrnail pressure, and few patchy foci of cortical blurring   - 3/25 MRIB with diffuse global abnormal supratentorial cortical restricted diffusion consistent with global hypoxic injury   -CTM    #Seizures  No further myoclonus of upper body after restarting propofol.   - Witness myoclonic jerking concerning for seizures iso anoxic brain injury  - s/p 2.5g keppra load & midazolam 2mg IV q8 PRN   - vEEG report: "Abundant myoclonic seizures in the setting of a severe diffuse/multifocal cerebral dysfunction which can be seen in the setting of sedative effect or due to an anoxic injury, with improvement after 20:50 suggesting a lowering of sedation"; will f/u with neuro recommendations. Repeat vEEG 3/24 showing severe cerebral dysfunction   -C/w levetiracetam 500 mg po BID & Diazepam 20 mg po TID (increased). Attempt to wean off propofol.  -Trend enolase   -Reach out to neurology about long-term myoclonus suppressive medication    CV:  #Shock - hemorrhagic shock s/p kidney biopsy s/p 5u pRBC, 3 plasma, 3 plt  last dose of lovenox AC on 3/18 at 5PM   Hgb 8 -> ~3.  - Not on levophed, MAP goal>65  - Monitor cbc q24  - Midodrine 10mg TID & trend CBC daily for Hg goal     #Hx of VTE (R IJ thrombus, L brachial DVT)  - CTM, hold AC given hemorrhage  - Bullae present on arm with DVT  - Appreciate orthopedics hand surgery consult, not compartment syndrome       PULM:  #Acute hypoxic respiratory failure  #Pna- 2/2 ventilation requirement  #Tracheostomy   In the setting of PEA arrest. Triggering ventilator on CPAP. Still requiring breathing support.   CXR with R lung field and left mid and lower lung field hazy opacities.  - Wean vent as able  - continue with duonebs Q6H & s/p HTS  - 3/28 CXR showing increasing RUL consolidation   -4/4 bedside tracheostomy   -S/p Abx completion course as below and currently on meropenem 500 mg Q24H (04/02 - 04/07)    RENAL:  #Acute renal failure.   #Renal mass s/p biopsy -   #Acidosis- RESOLVED   Oliguric & receiving Bumex IVP PRN per nephro recs  - TOV 3/28, failed; replaced daniel on 4/1; Attempt TOV (04/07/25)  - C/w doxazoin 1 mg po qhs to optimize subsequent TOV trial   - Trend Cr, uop, lytes  - S/p bicarb drip  - Per nephro, HD session #1 3/24, HD sessions #2 3/25, HD session #3 3/27; planning HD #4 3/29; 4/3 HD #5; will monitor off HD. HD sessions per nephro  -C/w sodium bicarbonate 650 mg po TID   - 3/31 removed L IJV shiley; 4/2 placed R fem shiley    - Per pathology result of renal biopsy, noninvasive urothelial malignancy     GI:  #Shock liver  Unchanged  LFTs. Jaundice with hyperbilirubinemia (direct).   - Trend liver enzymes  - RUQ US showing enlarged periportal and periaortic enlarged lymph nodes and enlarged left lateral liver lobe and CBD 9 mm.       #Diet:  - NPO except meds   - GI unable to place PEG due to hepatomegaly.   - Per IR consult: Given no safe window for endoscopic placement, recommend surgical consultation.   -Will require surgical consult for enteral feeding tube placement. Surgery consulted      HEMATOLOGIC/ONCOLGOGIC   #Sickle cell crisis  #Acute blood loss anemia- 2/2 sickle cells and uremia vs critical illness vs GIB  Downtrending direct hyperbilirubinemia,  gradually & Increasing reticulocyte count.   - Heme following   -D/c deferasirox due to current renal failure   -S/p 1 uPRBC 3/31, 1 uPRBC 4/2, 1 uPRBC 4/5,   - Occult blood positive and GI was following. Patient will require OP EGD. C/w PPI 40 mg IV BID for now.  - Transfuse as needed Hb >6 goal per Heme.    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 CXR showing increased RUL opacification.   - D/c  aztreonam 2000 mg IV qd ( started 3/22- 3/27)   - S/p vancomycin 1500 mg IV qd (4/2); 2nd dose 4/4, 3rd dose 4/5  - 4/2- CXR showing increasing RUL consolidation  - C/w 5d meropenem 500 mg IV qd (started 3/27-3/31); (4/2-4/7). Hold off Meropenem after 5 day course     MSK:  #infiltration  Infiltration of sodium bicarb into left arm, now with arm distension and bullae. Elevated uric acid with nodule suggegstive of podagra however no signs of active gout flare.   -Appreciate orthopedic hand surgery, not compartment syndrome   - CTM    Ethics: Full Code   Pending: S/p trach and planning for enteral tube placement for feeding. IR & GI with no window for placement requiring surgical consult.  Palliative Team: Consulted Assessment and Plan: 	  Patient is 44y Male with PMHx of sickle cell disease admitted for anemia concerning for sickle cell crisis s/p ureteroscopy w/ L kidney biopsy 3/19, post-operative course c/b acute hemorrhagic shock, PEA arrest with ROSC; currently with acute renal failure on HD and global anoxic brain injury and urothelial cancer; s/p tracheostomy pending possible G/J tube placement by surgery given no safe endoscopic window for PEG placement.       NEURO:  #Global anoxic brain injury   AAOx0. On sedation with no further myoclonus. Guarded prognosis.   - Wean propofol as tolerated & increase valium to 20 mg TID  - D/marjan fenatnyl   - S/p versed gtt, weaned off 3/23  - Repeat CT B 3/24 showing diffuse sulcal effacement with increased intracrnail pressure, and few patchy foci of cortical blurring   - 3/25 MRIB with diffuse global abnormal supratentorial cortical restricted diffusion consistent with global hypoxic injury   -CTM    #Seizures  No further myoclonus of upper body after restarting propofol.   - Witness myoclonic jerking concerning for seizures iso anoxic brain injury  - s/p 2.5g keppra load & midazolam 2mg IV q8 PRN   - vEEG report: "Abundant myoclonic seizures in the setting of a severe diffuse/multifocal cerebral dysfunction which can be seen in the setting of sedative effect or due to an anoxic injury, with improvement after 20:50 suggesting a lowering of sedation"; will f/u with neuro recommendations. Repeat vEEG 3/24 showing severe cerebral dysfunction   -C/w levetiracetam 500 mg po BID & Diazepam 20 mg po TID (increased). Attempt to wean off propofol.  -Trend enolase   -Reach out to neurology about long-term myoclonus suppressive medication    CV:  #Shock - hemorrhagic shock s/p kidney biopsy s/p 5u pRBC, 3 plasma, 3 plt  last dose of lovenox AC on 3/18 at 5PM   Hgb 8 -> ~3.  - Not on levophed, MAP goal>65  - Monitor cbc q24  - Midodrine 10mg TID & trend CBC daily for Hg goal     #Hx of VTE (R IJ thrombus, L brachial DVT)  - CTM, hold AC given hemorrhage  - Bullae present on arm with DVT  - Appreciate orthopedics hand surgery consult, not compartment syndrome       PULM:  #Acute hypoxic respiratory failure  #Pna- 2/2 ventilation requirement  #Tracheostomy   In the setting of PEA arrest. Triggering ventilator on CPAP. Still requiring breathing support.   CXR with R lung field and left mid and lower lung field hazy opacities.  - Wean vent as able  - continue with duonebs Q6H & s/p HTS  - 3/28 CXR showing increasing RUL consolidation   -4/4 bedside tracheostomy   -S/p Abx completion course as below and currently on meropenem 500 mg Q24H (04/02 - 04/07)    RENAL:  #Acute renal failure.   #Renal mass s/p biopsy -   #Acidosis- RESOLVED   Oliguric & receiving Bumex IVP PRN per nephro recs  - TOV 3/28, failed; replaced daniel on 4/1; Attempt TOV (04/07/25)  - C/w doxazoin 1 mg po qhs to optimize subsequent TOV trial   - Trend Cr, uop, lytes  - S/p bicarb drip  - Per nephro, HD session #1 3/24, HD sessions #2 3/25, HD session #3 3/27; planning HD #4 3/29; 4/3 HD #5; will monitor off HD. HD sessions per nephro  -C/w sodium bicarbonate 650 mg po TID   - 3/31 removed L IJV shiley; 4/2 placed R fem shiley    - Per pathology result of renal biopsy, noninvasive urothelial malignancy     GI:  #Shock liver  Unchanged  LFTs. Jaundice with hyperbilirubinemia (direct).   - Trend liver enzymes  - RUQ US showing enlarged periportal and periaortic enlarged lymph nodes and enlarged left lateral liver lobe and CBD 9 mm.       #Diet:  - NPO except meds   - GI unable to place PEG due to hepatomegaly.   - Per IR consult: Given no safe window for endoscopic placement, recommend surgical consultation.   -Will require surgical consult for enteral feeding tube placement. Surgery consulted      HEMATOLOGIC/ONCOLGOGIC   #Sickle cell crisis  #Acute blood loss anemia- 2/2 sickle cells and uremia vs critical illness vs GIB  Downtrending direct hyperbilirubinemia,  gradually & Increasing reticulocyte count.   - Heme following   -D/c deferasirox due to current renal failure   -S/p 1 uPRBC 3/31, 1 uPRBC 4/2, 1 uPRBC 4/5,   - Occult blood positive. C/w PPI 40 mg IV BID for now.  - Transfuse as needed Hb >6 goal per Heme.    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 CXR showing increased RUL opacification.   - D/c  aztreonam 2000 mg IV qd ( started 3/22- 3/27)   - S/p vancomycin 1500 mg IV qd (4/2); 2nd dose 4/4, 3rd dose 4/5  - 4/2- CXR showing increasing RUL consolidation  - C/w 5d meropenem 500 mg IV qd (started 3/27-3/31); (4/2-4/7). Hold off Meropenem after 5 day course     MSK:  #infiltration  Infiltration of sodium bicarb into left arm, now with arm distension and bullae. Elevated uric acid with nodule suggegstive of podagra however no signs of active gout flare.   -Appreciate orthopedic hand surgery, not compartment syndrome   - CTM    Ethics: Full Code   Pending: S/p trach and planning for enteral tube placement for feeding. IR & GI with no window for placement requiring surgical consult.  Palliative Team: Consulted Assessment and Plan: 	  Patient is 44y Male with PMHx of sickle cell disease admitted for anemia concerning for sickle cell crisis s/p ureteroscopy w/ L kidney biopsy 3/19, post-operative course c/b acute hemorrhagic shock, PEA arrest with ROSC; currently with acute renal failure on HD and global anoxic brain injury and urothelial cancer; s/p tracheostomy pending possible G/J tube placement by surgery given no safe endoscopic window for PEG placement.       NEURO:  #Global anoxic brain injury   AAOx0. On sedation with no further myoclonus. Guarded prognosis.   - Wean propofol as tolerated & increase valium to 20 mg TID  - D/marjan fenatnyl   - S/p versed gtt, weaned off 3/23  - Repeat CT B 3/24 showing diffuse sulcal effacement with increased intracrnail pressure, and few patchy foci of cortical blurring   - 3/25 MRIB with diffuse global abnormal supratentorial cortical restricted diffusion consistent with global hypoxic injury   -CTM    #Seizures  No further myoclonus of upper body after restarting propofol.   - Witness myoclonic jerking concerning for seizures iso anoxic brain injury  - s/p 2.5g keppra load & midazolam 2mg IV q8 PRN   - vEEG report: "Abundant myoclonic seizures in the setting of a severe diffuse/multifocal cerebral dysfunction which can be seen in the setting of sedative effect or due to an anoxic injury, with improvement after 20:50 suggesting a lowering of sedation"; will f/u with neuro recommendations. Repeat vEEG 3/24 showing severe cerebral dysfunction   -C/w levetiracetam 500 mg po BID & Diazepam 20 mg po TID (increased). Attempt to wean off propofol.  -Trend enolase   -Reach out to neurology about long-term myoclonus suppressive medication    CV:  #Shock - hemorrhagic shock s/p kidney biopsy s/p 5u pRBC, 3 plasma, 3 plt  last dose of lovenox AC on 3/18 at 5PM   Hgb 8 -> ~3.  - Not on levophed, MAP goal>65  - Monitor cbc q24  - Midodrine 10mg TID & trend CBC daily for Hg goal     #Hx of VTE (R IJ thrombus, L brachial DVT)  - CTM, hold AC given hemorrhage  - Bullae present on arm with DVT  - Appreciate orthopedics hand surgery consult, not compartment syndrome       PULM:  #Acute hypoxic respiratory failure  #Pna- 2/2 ventilation requirement  #Tracheostomy   In the setting of PEA arrest. Triggering ventilator on CPAP. Still requiring breathing support.   CXR with R lung field and left mid and lower lung field hazy opacities.  - Wean vent as able  - continue with duonebs Q6H & s/p HTS  - 3/28 CXR showing increasing RUL consolidation   -4/4 bedside tracheostomy   -S/p Abx completion course as below and currently on meropenem 500 mg Q24H (04/02 - 04/07)    RENAL:  #Acute renal failure.   #Renal mass s/p biopsy -   #Acidosis- RESOLVED   Oliguric & receiving Bumex IVP PRN per nephro recs  - TOV 3/28, failed; replaced daniel on 4/1; Attempt TOV (04/07/25)  - C/w doxazoin 1 mg po qhs to optimize subsequent TOV trial   - Trend Cr, uop, lytes  - S/p bicarb drip  - Per nephro, HD session #1 3/24, HD sessions #2 3/25, HD session #3 3/27; planning HD #4 3/29; 4/3 HD #5; will monitor off HD. HD sessions per nephro  -C/w sodium bicarbonate 650 mg po TID   - 3/31 removed L IJV shiley; 4/2 placed R fem shiley    - Per pathology result of renal biopsy, noninvasive urothelial malignancy     GI:  #Shock liver  Unchanged  LFTs. Jaundice with hyperbilirubinemia (direct).   - Trend liver enzymes  - RUQ US showing enlarged periportal and periaortic enlarged lymph nodes and enlarged left lateral liver lobe and CBD 9 mm.       #Diet:  - NPO except meds   - GI unable to place PEG due to hepatomegaly.   - Per IR consult: Given no safe window for endoscopic placement, recommend surgical consultation.   -Will require surgical consult for enteral feeding tube placement. Surgery consulted      HEMATOLOGIC/ONCOLGOGIC   #Sickle cell crisis  #Acute blood loss anemia- 2/2 sickle cells and uremia vs critical illness vs GIB  Downtrending direct hyperbilirubinemia,  gradually & Increasing reticulocyte count.   - Heme following   -D/c deferasirox due to current renal failure   -S/p 1 uPRBC 3/31, 1 uPRBC 4/2, 1 uPRBC 4/5,   - Occult blood positive. C/w PPI 40 mg IV BID for now.  - Transfuse as needed Hb >6 goal per Heme.    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 CXR showing increased RUL opacification.   - D/c  aztreonam 2000 mg IV qd ( started 3/22- 3/27)   - S/p vancomycin 1500 mg IV qd (4/2); 2nd dose 4/4, 3rd dose 4/5  - 4/2- CXR showing increasing RUL consolidation  - C/w 5d meropenem 500 mg IV qd (started 3/27-3/31); (4/2-4/7). Hold off Meropenem after 5 day course     MSK:  #infiltration  Infiltration of sodium bicarb into left arm, now with arm distension and bullae. Elevated uric acid with nodule suggegstive of podagra however no signs of active gout flare.   -Appreciate orthopedic hand surgery, not compartment syndrome   - CTM    Ethics: Full Code   Pending: S/p trach and planning for enteral tube placement for feeding by surgery team.  Palliative Team: Consulted Assessment and Plan: 	  Patient is 44y Male with PMHx of sickle cell disease admitted for anemia concerning for sickle cell crisis s/p ureteroscopy w/ L kidney biopsy 3/19, post-operative course c/b acute hemorrhagic shock, PEA arrest with ROSC; currently with acute renal failure on HD and global anoxic brain injury and urothelial cancer; s/p tracheostomy pending possible G/J tube placement by surgery given no safe endoscopic window for PEG placement.       NEURO:  #Global anoxic brain injury   AAOx0. On sedation with no further myoclonus. Guarded prognosis.   - Wean propofol as tolerated & increase valium to 20 mg TID  - D/marjan fenatnyl   - S/p versed gtt, weaned off 3/23  - Repeat CT B 3/24 showing diffuse sulcal effacement with increased intracrnail pressure, and few patchy foci of cortical blurring   - 3/25 MRIB with diffuse global abnormal supratentorial cortical restricted diffusion consistent with global hypoxic injury   -CTM    #Seizures  No further myoclonus of upper body after restarting propofol.   - Witness myoclonic jerking concerning for seizures iso anoxic brain injury  - s/p 2.5g keppra load & midazolam 2mg IV q8 PRN   - vEEG report: "Abundant myoclonic seizures in the setting of a severe diffuse/multifocal cerebral dysfunction which can be seen in the setting of sedative effect or due to an anoxic injury, with improvement after 20:50 suggesting a lowering of sedation"; will f/u with neuro recommendations. Repeat vEEG 3/24 showing severe cerebral dysfunction   -C/w levetiracetam 500 mg po BID & Diazepam 20 mg po TID (increased). Attempt to wean off propofol.  -Trend enolase   -Reach out to neurology about long-term myoclonus suppressive medication    CV:  #Shock - hemorrhagic shock s/p kidney biopsy s/p 5u pRBC, 3 plasma, 3 plt  last dose of lovenox AC on 3/18 at 5PM   Hgb 8 -> ~3.  - Not on levophed, MAP goal>65  - Monitor cbc q24  - Midodrine 10mg TID & trend CBC daily for Hg goal     #Hx of VTE (R IJ thrombus, L brachial DVT)  - CTM, hold AC given hemorrhage  - Bullae present on arm with DVT  - Appreciate orthopedics hand surgery consult, not compartment syndrome       PULM:  #Acute hypoxic respiratory failure  #Pna- 2/2 ventilation requirement  #Tracheostomy   In the setting of PEA arrest. Triggering ventilator on CPAP. Still requiring breathing support.   CXR with R lung field and left mid and lower lung field hazy opacities.  - Wean vent as able  - continue with duonebs Q6H & s/p HTS  - 3/28 CXR showing increasing RUL consolidation   -4/4 bedside tracheostomy   -S/p Abx completion course as below and currently on meropenem 500 mg Q24H (04/02 - 04/07)    RENAL:  #Acute renal failure.   #Renal mass s/p biopsy -   #Acidosis- RESOLVED   Oliguric & receiving Bumex IVP PRN per nephro recs  - TOV 3/28, failed; replaced daniel on 4/1; Attempt TOV (04/07/25)  - C/w doxazoin 1 mg po qhs to optimize subsequent TOV trial   - Trend Cr, uop, lytes  - S/p bicarb drip  - Per nephro, HD session #1 3/24, HD sessions #2 3/25, HD session #3 3/27; planning HD #4 3/29; 4/3 HD #5; will monitor off HD. HD sessions per nephro  -C/w sodium bicarbonate 650 mg po TID   - 3/31 removed L IJV shiley; 4/2 placed R fem shiley    - Per pathology result of renal biopsy, noninvasive urothelial malignancy     GI:  #Shock liver  Unchanged  LFTs. Jaundice with hyperbilirubinemia (direct).   - Trend liver enzymes  - RUQ US showing enlarged periportal and periaortic enlarged lymph nodes and enlarged left lateral liver lobe and CBD 9 mm.       #Diet:  - NPO except meds   - GI unable to place PEG due to hepatomegaly.   - Per IR consult: Given no safe window for endoscopic placement, recommend surgical consultation.   -Will require surgical consult for enteral feeding tube placement. Surgery consulted      HEMATOLOGIC/ONCOLGOGIC   #Sickle cell crisis  #Acute blood loss anemia- 2/2 sickle cells and uremia vs critical illness vs GIB  Downtrending direct hyperbilirubinemia,  gradually & Increasing reticulocyte count.   - Heme following   -D/c deferasirox due to current renal failure   -S/p 1 uPRBC 3/31, 1 uPRBC 4/2, 1 uPRBC 4/5,   - Occult blood positive. C/w PPI 40 mg IV BID for now.  - Transfuse as needed Hb >6 goal per Heme.    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 CXR showing increased RUL opacification.   - D/c  aztreonam 2000 mg IV qd ( started 3/22- 3/27)   - S/p vancomycin 1500 mg IV qd (4/2); 2nd dose 4/4, 3rd dose 4/5  - 4/2- CXR showing increasing RUL consolidation  - C/w 5d meropenem 500 mg IV qd (started 3/27-3/31); (4/2-4/7). Hold off Meropenem after 5 day course     MSK:  #infiltration  Infiltration of sodium bicarb into left arm, now with arm distension and bullae. Elevated uric acid with nodule suggegstive of podagra however no signs of active gout flare.   -Appreciate orthopedic hand surgery, not compartment syndrome   - CTM    Ethics: Full Code   Pending: S/p trach and planning for enteral tube placement for feeding by surgery team.  Palliative Team: Consulted

## 2025-04-07 NOTE — PROGRESS NOTE ADULT - ATTENDING COMMENTS
1. Acute hypoxemic respiratory failure s/p cardiac arrest. Pt now with tracheostomy tube. Tolerating AC vent setting. Did not tolerate PS trial today.  2. Neuro: Anoxic brain injury. Pt with myoclonic seizures. Continue Keppra. Increase Valium to 20mg tid. . Taper off propofol as tolerated.  3. Surgical input for PEG/PEJ  4.ID. Pt started on meropenem for fevers. ? Neurologic fevers. Finish 5 day course of meropenem.  5.HEME. H/O ss anemia. Keep HB>6  6. Renal: Acute renal failure s/p cardiac arrest. Likely ATN. Intermittent HD .  7. Renal bx with urothelial cancer.  8.DVT prophylaxis: SCD  9. GOC: Full code . Palliative  care consult.

## 2025-04-07 NOTE — CONSULT NOTE ADULT - SUBJECTIVE AND OBJECTIVE BOX
GENERAL SURGERY CONSULT NOTE  Consulting surgical team: B team  Consulting attending: Nba Enciso    HPI:  44M with history of sickle cell disease with multiple crises, gout, HTN, RUE DVT on Eliquis, admitted for anemia on bloodwork before cystoscopy. During this admission patient underwent L ureteroscopy, L kidney mass biopsy, and stent placement 3/19/25 complicated by perinephric hemorrhagic shock with PEA arrest, ROSC after 4 minutes however per documentation unclear down time/anoxic brain time. Brought to MICU intubated sedated on pressors. Underwent IR embolization of left renal artery 3/20. Family at bedside report patient stated he would never want to "be a vegetable" but family states there is a chance he may recover. Review of chart shows unclear but likely poor prognosis, last neurology note 3/27 discussed palliative extubation. Per GI and IR no safe window for percutaneous/endoscopic placement due to hepatomegaly.    PAST MEDICAL HISTORY:  Sickle cell anemia  Gout  Anemia requiring transfusions  Marijuana abuse  Pneumonia  Deep vein thrombosis (DVT)  Iron overload  Hypertension    PAST SURGICAL HISTORY:  History of cholecystectomy  History of removal of Port-a-Cath    SOCIAL HISTORY:  - Denies EtOH abuse, smoking, IVDA    MEDICATIONS:  albuterol/ipratropium for Nebulization 3 milliLiter(s) Nebulizer every 6 hours  allopurinol 50 milliGRAM(s) Oral <User Schedule>  chlorhexidine 0.12% Liquid 15 milliLiter(s) Oral Mucosa every 12 hours  chlorhexidine 2% Cloths 1 Application(s) Topical daily  diazepam    Tablet 20 milliGRAM(s) Oral three times a day  doxazosin 1 milliGRAM(s) Oral at bedtime  folic acid 1 milliGRAM(s) Oral daily  heparin   Injectable 5000 Unit(s) SubCutaneous every 8 hours  influenza   Vaccine 0.5 milliLiter(s) IntraMuscular once  levETIRAcetam  Solution 500 milliGRAM(s) Oral two times a day  meropenem  IVPB 500 milliGRAM(s) IV Intermittent every 24 hours  midodrine 10 milliGRAM(s) Oral every 8 hours  pantoprazole  Injectable 40 milliGRAM(s) IV Push every 12 hours  petrolatum Ophthalmic Ointment 1 Application(s) Both EYES two times a day  propofol Infusion 50 MICROgram(s)/kG/Min IV Continuous <Continuous>  sodium bicarbonate 650 milliGRAM(s) Oral three times a day      ALLERGIES:  penicillin (Pruritus)  hydroxyurea (Other)  piperacillin-tazobactam (Urticaria)  ceftriaxone (Anaphylaxis)      VITALS & I/Os:  Vital Signs Last 24 Hrs  T(C): 36.9 (07 Apr 2025 12:00), Max: 37.5 (06 Apr 2025 20:00)  T(F): 98.4 (07 Apr 2025 12:00), Max: 99.5 (06 Apr 2025 20:00)  HR: 109 (07 Apr 2025 15:24) (105 - 119)  BP: 113/67 (07 Apr 2025 15:00) (97/46 - 121/68)  BP(mean): 79 (07 Apr 2025 15:00) (63 - 84)  RR: 20 (07 Apr 2025 15:00) (20 - 31)  SpO2: 97% (07 Apr 2025 15:24) (91% - 97%)    Parameters below as of 07 Apr 2025 15:00  Patient On (Oxygen Delivery Method): ventilator      Mode: AC/ CMV (Assist Control/ Continuous Mandatory Ventilation)  RR (machine): 20  TV (machine): 460  FiO2: 40  PEEP: 6  ITime: 0.8  MAP: 12  PIP: 30    I&O's Summary    06 Apr 2025 07:01  -  07 Apr 2025 07:00  --------------------------------------------------------  IN: 1385.2 mL / OUT: 1280 mL / NET: 105.2 mL    07 Apr 2025 07:01  -  07 Apr 2025 15:46  --------------------------------------------------------  IN: 156.4 mL / OUT: 250 mL / NET: -93.6 mL        PHYSICAL EXAM:  GEN: resting comfortably in bed, in NAD  HEENT: normocephalic, non-tender to palpation, no abrasions visible, no step-offs palpated  NECK: no JVD, non-tender to palpation at posterior midline, no pain with flexion, extension, and bilateral neck rotation  CHEST: non-tender to palpation across clavicles and b/l anterior ribs  BACK: non-tender to palpation along cervical, thoracic, lumbar spine midline and b/l posterior ribs; no palpable step-offs or hematomas  ABD: soft, non-distended, non-tender   RECTUM: good rectal tone  LUE: non-tender to palpation across upper and lower arm, 5/5  strength, fingers warm, well-perfused, full ROM in shoulder, elbow, wrist, and fingers, palpable radial + ulnar pulses  RUE: non-tender to palpation across upper and lower arm, 5/5  strength, fingers warm, well-perfused, full ROM in shoulder, elbow, wrist, and fingers, palpable radial + ulnar pulses  LLE: non-tender to palpation across upper and lower leg; full ROM in hip, knee, ankle, and toes, 5/5 dorsiflexion + plantarflexion, palpable DP + PT pulses; warm, well-perfused  RLE: non-tender to palpation across upper and lower leg; full ROM in hip, knee, ankle, and toes, 5/5 dorsiflexion + plantarflexion, palpable DP + PT pulses; warm, well-perfused  NEURO: AAOx4, no focal neuro deficits; CN II-IX intact    LABS:                        5.5    18.86 )-----------( 293      ( 07 Apr 2025 11:22 )             18.0     04-07    133[L]  |  94[L]  |  104[H]  ----------------------------<  102[H]  4.2   |  14[L]  |  4.22[H]    Ca    9.1      07 Apr 2025 00:24  Phos  9.5     04-07  Mg     2.10     04-07    TPro  6.8  /  Alb  2.3[L]  /  TBili  17.8[H]  /  DBili  16.0[H]  /  AST  147[H]  /  ALT  37  /  AlkPhos  337[H]  04-07    Lactate:  04-07 @ 00:24  1.2    PT/INR - ( 07 Apr 2025 00:24 )   PT: 12.6 sec;   INR: 1.06 ratio         PTT - ( 07 Apr 2025 00:24 )  PTT:30.5 sec          Urinalysis Basic - ( 07 Apr 2025 00:24 )    Color: x / Appearance: x / SG: x / pH: x  Gluc: 102 mg/dL / Ketone: x  / Bili: x / Urobili: x   Blood: x / Protein: x / Nitrite: x   Leuk Esterase: x / RBC: x / WBC x   Sq Epi: x / Non Sq Epi: x / Bacteria: x        IMAGING:

## 2025-04-07 NOTE — PROGRESS NOTE ADULT - ATTENDING COMMENTS
POD 3, doing well from tracheostomy standpoint. Attempting to wean off the ventilator but limited by neurological status. Ongoing coagulopathy, trach site is dry. IP team to follow for any bleeding. Ongoing goals of care discussion. Overall prognosis very guarded. Suture removal 4/14

## 2025-04-07 NOTE — PROGRESS NOTE ADULT - ATTENDING COMMENTS
MATA  ATN  Pigment nephropathy    Will plan for HD tomorrow  Continue supportive measures  Continue to monitor labs and Is/Os

## 2025-04-07 NOTE — PROGRESS NOTE ADULT - ASSESSMENT
44 year old male with history of sickle cell disease, UE DVT, gout, HTN, with L renal mass admitted with acute anemia/ sickle cell crisis and evaluation of his left renal mass s/p left ureteroscopy and biopsy (3/19/25) with post operative course complicated by rapid responses for hypoglycemia c/b cardiac arrest x 4 minutes CPR/1 epi with ROSC, transferred to MICU for further care, found to have anoxic brain injury. Interventional pulmonology consulted for evaluation for tracheostomy.    Intubated 3/20/25 for cardiac arrest. Bedside POCUS evaluation revealed 1st tracheal ring with overlying small vessel which is easily compressible and non-pulsatile and without overlying innominate vessel.  4/4/25 s/p percutaneous tracheostomy 7 cuffed portex placed with minimal bleeding. Tolerated procedure without issue.   Post-procedure CXR confirmed tracheostomy tube in appropriate position     Recommendations:  - monitor for bleeding/ ozzing  - keep back-up tracheostomy in the room at all times  - minimize tension on tracheostomy  - c/w in-line suctioning   - sutures to remain in place for 10d (will remove 4/14)   Pls see procedure note for additional details    Discussed with Dr. Webb and MICU team

## 2025-04-07 NOTE — PROGRESS NOTE ADULT - SUBJECTIVE AND OBJECTIVE BOX
PULMONARY PROGRESS NOTE    PATIENT INFORMATION:  NAME: TOUL VARGAS:  MRN: [id number]    CHIEF COMPLAINT: Patient is a 44y old  Male who presents with a chief complaint of Referred by Hematologist for low Hg (07 Apr 2025 10:46)      [x] INITIAL CONSULT, H&P, FAMILY HISTORY and PAST MEDICAL AND SURGICAL HISTORY REVIEWED    OVERNIGHT EVENTS, CHANGES TO HPI, SUBJECTIVE:   - Patient seen and examined at bedside  - No overnight events  - Symptoms stable/improving  s/p tracheostomy 4/7; remains to vent, noted by nursing staff to have coughed x2 resulting in disconnection of ventilator tubing, promptly reattached     ========================REVIEW OF SYSTEMS========================  [x] ROS negative except as per HPI    ========================MEDICATIONS=============================  NEURO  diazepam    Tablet 20 milliGRAM(s) Oral three times a day  levETIRAcetam  Solution 500 milliGRAM(s) Oral two times a day  propofol Infusion 50 MICROgram(s)/kG/Min (26.6 mL/Hr) IV Continuous <Continuous>    CARDIO  doxazosin 1 milliGRAM(s) Oral at bedtime  midodrine 10 milliGRAM(s) Oral every 8 hours    PULM  albuterol/ipratropium for Nebulization 3 milliLiter(s) Nebulizer every 6 hours    FEN/GI  folic acid 1 milliGRAM(s) Oral daily  pantoprazole  Injectable 40 milliGRAM(s) IV Push every 12 hours  sodium bicarbonate 650 milliGRAM(s) Oral three times a day    HEME/ONC  heparin   Injectable 5000 Unit(s) SubCutaneous every 8 hours    ANTIMICROBIALS  meropenem  IVPB 500 milliGRAM(s) IV Intermittent every 24 hours    ENDO  allopurinol 50 milliGRAM(s) Oral <User Schedule>    OTHER  chlorhexidine 0.12% Liquid 15 milliLiter(s) Oral Mucosa every 12 hours  chlorhexidine 2% Cloths 1 Application(s) Topical daily  influenza   Vaccine 0.5 milliLiter(s) IntraMuscular once  petrolatum Ophthalmic Ointment 1 Application(s) Both EYES two times a day      PRN      Drips      ========================PHYSICAL EXAM============================    VITALS: Vital Signs Last 24 Hrs  T(C): 36.9 (07 Apr 2025 12:00), Max: 37.5 (06 Apr 2025 20:00)  T(F): 98.4 (07 Apr 2025 12:00), Max: 99.5 (06 Apr 2025 20:00)  HR: 111 (07 Apr 2025 13:00) (105 - 119)  BP: 114/67 (07 Apr 2025 13:00) (97/46 - 121/68)  BP(mean): 79 (07 Apr 2025 13:00) (63 - 84)  RR: 21 (07 Apr 2025 13:00) (20 - 31)  SpO2: 97% (07 Apr 2025 13:00) (91% - 97%)    Parameters below as of 07 Apr 2025 13:00  Patient On (Oxygen Delivery Method): ventilator        INTAKE and OUTPUT: I&O's Detail    06 Apr 2025 07:01  -  07 Apr 2025 07:00  --------------------------------------------------------  IN:    IV PiggyBack: 50 mL    Lactated Ringers: 1000 mL    Propofol: 335.2 mL  Total IN: 1385.2 mL    OUT:    Indwelling Catheter - Urethral (mL): 480 mL    Stool (mL): 800 mL  Total OUT: 1280 mL    Total NET: 105.2 mL      07 Apr 2025 07:01  -  07 Apr 2025 14:00  --------------------------------------------------------  IN:    Propofol: 79.8 mL  Total IN: 79.8 mL    OUT:    Indwelling Catheter - Urethral (mL): 150 mL  Total OUT: 150 mL    Total NET: -70.2 mL          VENTILATOR SETTINGS: Mode: AC/ CMV (Assist Control/ Continuous Mandatory Ventilation)  RR (machine): 20  TV (machine): 460  FiO2: 40  PEEP: 6  ITime: 0.8  MAP: 12  PIP: 28      Physical Exam  HEENT: EOMI, sclera icteric; tracheostomy in place; dressing clean, no bleeding, sutures in place  Neck: supple  Cardiovascular: RR  Respiratory: coarse BS b/l   Abdomen: soft  Extremities: warm and well perfused, bilateral LE edema  Neurological: AAOx0    ======================LABORATORY RESULTS AND IMAGING=============                        5.5    18.86 )-----------( 293      ( 07 Apr 2025 11:22 )             18.0                                  04-07    133[L]  |  94[L]  |  104[H]  ----------------------------<  102[H]  4.2   |  14[L]  |  4.22[H]    Ca    9.1      07 Apr 2025 00:24  Phos  9.5     04-07  Mg     2.10     04-07    TPro  6.8  /  Alb  2.3[L]  /  TBili  17.8[H]  /  DBili  16.0[H]  /  AST  147[H]  /  ALT  37  /  AlkPhos  337[H]  04-07      Ref-range: <3 normal, >9 elevated, >18 very elevated          ABG Trend  04-02-25 @ 01:12 FiO2--  - 7.36/31/167/18/98.9 Lactate --  04-01-25 @ 00:26 FiO2--  - 7.39/35/104/21/99.3 Lactate --  03-31-25 @ 01:30 FiO2--  - 7.41/36/149/23/100.0 Lactate --  03-30-25 @ 00:40 FiO2--  - 7.43/39/94/26/98.6 Lactate --  03-29-25 @ 13:58 FiO2--  - 7.47/35/157/26/99.0 Lactate --    VBG Trend  04-07-25 @ 00:24 FiO2--  - 7.37/33/50/19/79.6 Lactate 1.2  04-06-25 @ 00:15 FiO2--  - 7.32/41/66/21/92.0 Lactate 1.1  04-04-25 @ 00:35 FiO2--  - 7.38/43/47/25/72.6 Lactate 1.3  04-03-25 @ 01:35 FiO2--  - 7.35/32/160/18/99.0 Lactate 1.0  03-20-25 @ 10:04 FiO2--  - 6.99/37/<20/9/68.5 Lactate >17.0      Creatinine Trend: 4.22<--, 3.88<--, 3.54<--, 3.06<--, 5.12<--, 5.38<--    [x] RADIOLOGY REVIEWED AND INTERPRETED BY ME

## 2025-04-07 NOTE — PROGRESS NOTE ADULT - SUBJECTIVE AND OBJECTIVE BOX
[facility] DIVISION OF KIDNEY DISEASES AND HYPERTENSION   FOLLOW UP NOTE    --------------------------------------------------------------------------------  Chief Complaint:    24 hour events/subjective: Pt. was seen and examined today in MICU. Remains sedated.      PAST HISTORY  --------------------------------------------------------------------------------  No significant changes to PMH, PSH, FHx, SHx, unless otherwise noted    ALLERGIES & MEDICATIONS  --------------------------------------------------------------------------------  Allergies    penicillin (Pruritus)  hydroxyurea (Other)  piperacillin-tazobactam (Urticaria)  ceftriaxone (Anaphylaxis)    Intolerances      Standing Inpatient Medications  albuterol/ipratropium for Nebulization 3 milliLiter(s) Nebulizer every 6 hours  allopurinol 50 milliGRAM(s) Oral <User Schedule>  buMETAnide Injectable 2 milliGRAM(s) IV Push once  chlorhexidine 0.12% Liquid 15 milliLiter(s) Oral Mucosa every 12 hours  chlorhexidine 2% Cloths 1 Application(s) Topical daily  diazepam    Tablet 20 milliGRAM(s) Oral three times a day  doxazosin 1 milliGRAM(s) Oral at bedtime  folic acid 1 milliGRAM(s) Oral daily  heparin   Injectable 5000 Unit(s) SubCutaneous every 8 hours  influenza   Vaccine 0.5 milliLiter(s) IntraMuscular once  levETIRAcetam  Solution 500 milliGRAM(s) Oral two times a day  meropenem  IVPB 500 milliGRAM(s) IV Intermittent every 24 hours  midodrine 10 milliGRAM(s) Oral every 8 hours  pantoprazole  Injectable 40 milliGRAM(s) IV Push every 12 hours  petrolatum Ophthalmic Ointment 1 Application(s) Both EYES two times a day  propofol Infusion 50 MICROgram(s)/kG/Min IV Continuous <Continuous>  sodium bicarbonate 650 milliGRAM(s) Oral three times a day    PRN Inpatient Medications      REVIEW OF SYSTEMS  --------------------------------------------------------------------------------  unable to obtain due to clinic status      VITALS/PHYSICAL EXAM  --------------------------------------------------------------------------------  T(C): 37 (04-07-25 @ 08:00), Max: 37.5 (04-06-25 @ 20:00)  HR: 112 (04-07-25 @ 10:00) (105 - 120)  BP: 121/68 (04-07-25 @ 10:00) (95/55 - 121/68)  RR: 31 (04-07-25 @ 10:00) (22 - 31)  SpO2: 91% (04-07-25 @ 10:00) (91% - 97%)  Wt(kg): --        04-06-25 @ 07:01  -  04-07-25 @ 07:00  --------------------------------------------------------  IN: 1385.2 mL / OUT: 1280 mL / NET: 105.2 mL    04-07-25 @ 07:01 - 04-07-25 @ 10:47  --------------------------------------------------------  IN: 26.6 mL / OUT: 70 mL / NET: -43.4 mL        Physical Exam:  	Gen: Ill appearing   	HEENT: Trached  	Pulm: CTA B/L  	CV: S1S2+  	Abd: Distended    	Ext: +edema  	Neuro: Sedated  	Skin: Warm and dry  	Dialysis access: Right femoral [facility]    LABS/STUDIES  --------------------------------------------------------------------------------              5.9    17.13 >-----------<  278      [04-07-25 @ 00:24]              16.0     133  |  94  |  104  ----------------------------<  102      [04-07-25 @ 00:24]  4.2   |  14  |  4.22        Ca     9.1     [04-07-25 @ 00:24]      iCa    1.15     [04-07 @ 00:24]      Mg     2.10     [04-07-25 @ 00:24]      Phos  9.5     [04-07-25 @ 00:24]    TPro  6.8  /  Alb  2.3  /  TBili  17.8  /  DBili  16.0  /  AST  147  /  ALT  37  /  AlkPhos  337  [04-07-25 @ 00:24]    PT/INR: PT 12.6 , INR 1.06       [04-07-25 @ 00:24]  PTT: 30.5       [04-07-25 @ 00:24]    Uric acid 8.6      [04-07-25 @ 00:24]        [04-07-25 @ 00:24]    Creatinine Trend:  SCr 4.22 [04-07 @ 00:24]  SCr 3.88 [04-06 @ 00:15]  SCr 3.54 [04-05 @ 01:02]  SCr 3.06 [04-04 @ 00:35]  SCr 5.12 [04-03 @ 01:35]    Urinalysis - [04-07-25 @ 00:24]      Color  / Appearance  / SG  / pH       Gluc 102 / Ketone   / Bili  / Urobili        Blood  / Protein  / Leuk Est  / Nitrite       RBC  / WBC  / Hyaline  / Gran  / Sq Epi  / Non Sq Epi  / Bacteria       HBsAb 50.9      [03-29-25 @ 07:15]  HBsAg Nonreact      [03-17-25 @ 15:26]  HBcAb Nonreact      [03-29-25 @ 07:15]  HCV 0.10, Nonreact      [03-17-25 @ 15:26]  HIV Nonreact      [03-17-25 @ 15:26]      Tacrolimus  Cyclosporine  Sirolimus  Mycophenolate  BK PCR  CMV PCRCMVPCR Log: NotDetec [id number]/mL (12-27 @ 05:38)    Parvo PCR  EBV PCR

## 2025-04-08 LAB
ALBUMIN SERPL ELPH-MCNC: 2.5 G/DL — LOW (ref 3.3–5)
ALP SERPL-CCNC: 269 U/L — HIGH (ref 40–120)
ALT FLD-CCNC: 24 U/L — SIGNIFICANT CHANGE UP (ref 4–41)
ANION GAP SERPL CALC-SCNC: 25 MMOL/L — HIGH (ref 7–14)
APTT BLD: 30.1 SEC — SIGNIFICANT CHANGE UP (ref 24.5–35.6)
AST SERPL-CCNC: 104 U/L — HIGH (ref 4–40)
BASOPHILS # BLD AUTO: 0.11 K/UL — SIGNIFICANT CHANGE UP (ref 0–0.2)
BASOPHILS NFR BLD AUTO: 0.7 % — SIGNIFICANT CHANGE UP (ref 0–2)
BILIRUB SERPL-MCNC: 16.5 MG/DL — HIGH (ref 0.2–1.2)
BLD GP AB SCN SERPL QL: NEGATIVE — SIGNIFICANT CHANGE UP
BLOOD GAS VENOUS COMPREHENSIVE RESULT: SIGNIFICANT CHANGE UP
BUN SERPL-MCNC: 122 MG/DL — HIGH (ref 7–23)
CALCIUM SERPL-MCNC: 8.9 MG/DL — SIGNIFICANT CHANGE UP (ref 8.4–10.5)
CHLORIDE SERPL-SCNC: 93 MMOL/L — LOW (ref 98–107)
CO2 SERPL-SCNC: 15 MMOL/L — LOW (ref 22–31)
CREAT SERPL-MCNC: 4.61 MG/DL — HIGH (ref 0.5–1.3)
EGFR: 15 ML/MIN/1.73M2 — LOW
EGFR: 15 ML/MIN/1.73M2 — LOW
EOSINOPHIL # BLD AUTO: 0.89 K/UL — HIGH (ref 0–0.5)
EOSINOPHIL NFR BLD AUTO: 5.5 % — SIGNIFICANT CHANGE UP (ref 0–6)
FLUAV AG NPH QL: SIGNIFICANT CHANGE UP
FLUBV AG NPH QL: SIGNIFICANT CHANGE UP
GLUCOSE BLDC GLUCOMTR-MCNC: 105 MG/DL — HIGH (ref 70–99)
GLUCOSE BLDC GLUCOMTR-MCNC: 117 MG/DL — HIGH (ref 70–99)
GLUCOSE BLDC GLUCOMTR-MCNC: 127 MG/DL — HIGH (ref 70–99)
GLUCOSE SERPL-MCNC: 94 MG/DL — SIGNIFICANT CHANGE UP (ref 70–99)
GRAM STN FLD: ABNORMAL
HCT VFR BLD CALC: 15.7 % — CRITICAL LOW (ref 39–50)
HCT VFR BLD CALC: 17.3 % — CRITICAL LOW (ref 39–50)
HGB BLD-MCNC: 5.7 G/DL — CRITICAL LOW (ref 13–17)
HGB BLD-MCNC: 6.4 G/DL — CRITICAL LOW (ref 13–17)
IANC: 11.28 K/UL — HIGH (ref 1.8–7.4)
IMM GRANULOCYTES NFR BLD AUTO: 2.5 % — HIGH (ref 0–0.9)
INR BLD: 1.06 RATIO — SIGNIFICANT CHANGE UP (ref 0.85–1.16)
LYMPHOCYTES # BLD AUTO: 1.48 K/UL — SIGNIFICANT CHANGE UP (ref 1–3.3)
LYMPHOCYTES # BLD AUTO: 9.1 % — LOW (ref 13–44)
MAGNESIUM SERPL-MCNC: 2.1 MG/DL — SIGNIFICANT CHANGE UP (ref 1.6–2.6)
MCHC RBC-ENTMCNC: 28.8 PG — SIGNIFICANT CHANGE UP (ref 27–34)
MCHC RBC-ENTMCNC: 29.5 PG — SIGNIFICANT CHANGE UP (ref 27–34)
MCHC RBC-ENTMCNC: 36.3 G/DL — HIGH (ref 32–36)
MCHC RBC-ENTMCNC: 37 G/DL — HIGH (ref 32–36)
MCV RBC AUTO: 79.3 FL — LOW (ref 80–100)
MCV RBC AUTO: 79.7 FL — LOW (ref 80–100)
MONOCYTES # BLD AUTO: 2.07 K/UL — HIGH (ref 0–0.9)
MONOCYTES NFR BLD AUTO: 12.7 % — SIGNIFICANT CHANGE UP (ref 2–14)
MRSA PCR RESULT.: SIGNIFICANT CHANGE UP
NEUTROPHILS # BLD AUTO: 11.28 K/UL — HIGH (ref 1.8–7.4)
NEUTROPHILS NFR BLD AUTO: 69.5 % — SIGNIFICANT CHANGE UP (ref 43–77)
NRBC # BLD AUTO: 0.06 K/UL — HIGH (ref 0–0)
NRBC # BLD AUTO: 0.07 K/UL — HIGH (ref 0–0)
NRBC # FLD: 0.06 K/UL — HIGH (ref 0–0)
NRBC # FLD: 0.07 K/UL — HIGH (ref 0–0)
NRBC BLD AUTO-RTO: 0 /100 WBCS — SIGNIFICANT CHANGE UP (ref 0–0)
NRBC BLD AUTO-RTO: 0 /100 WBCS — SIGNIFICANT CHANGE UP (ref 0–0)
PHOSPHATE SERPL-MCNC: 9.9 MG/DL — HIGH (ref 2.5–4.5)
PLATELET # BLD AUTO: 272 K/UL — SIGNIFICANT CHANGE UP (ref 150–400)
PLATELET # BLD AUTO: 290 K/UL — SIGNIFICANT CHANGE UP (ref 150–400)
POTASSIUM SERPL-MCNC: 4.5 MMOL/L — SIGNIFICANT CHANGE UP (ref 3.5–5.3)
POTASSIUM SERPL-SCNC: 4.5 MMOL/L — SIGNIFICANT CHANGE UP (ref 3.5–5.3)
PROT SERPL-MCNC: 6.7 G/DL — SIGNIFICANT CHANGE UP (ref 6–8.3)
PROTHROM AB SERPL-ACNC: 12.6 SEC — SIGNIFICANT CHANGE UP (ref 9.9–13.4)
RBC # BLD: 1.98 M/UL — LOW (ref 4.2–5.8)
RBC # BLD: 2.17 M/UL — LOW (ref 4.2–5.8)
RBC # FLD: 18.2 % — HIGH (ref 10.3–14.5)
RBC # FLD: 19.8 % — HIGH (ref 10.3–14.5)
RH IG SCN BLD-IMP: POSITIVE — SIGNIFICANT CHANGE UP
RSV RNA NPH QL NAA+NON-PROBE: SIGNIFICANT CHANGE UP
S AUREUS DNA NOSE QL NAA+PROBE: DETECTED
SARS-COV-2 RNA SPEC QL NAA+PROBE: SIGNIFICANT CHANGE UP
SODIUM SERPL-SCNC: 133 MMOL/L — LOW (ref 135–145)
SOURCE RESPIRATORY: SIGNIFICANT CHANGE UP
SPECIMEN SOURCE: SIGNIFICANT CHANGE UP
WBC # BLD: 16.24 K/UL — HIGH (ref 3.8–10.5)
WBC # BLD: 17.93 K/UL — HIGH (ref 3.8–10.5)
WBC # FLD AUTO: 16.24 K/UL — HIGH (ref 3.8–10.5)
WBC # FLD AUTO: 17.93 K/UL — HIGH (ref 3.8–10.5)

## 2025-04-08 PROCEDURE — 99291 CRITICAL CARE FIRST HOUR: CPT | Mod: GC

## 2025-04-08 PROCEDURE — 90935 HEMODIALYSIS ONE EVALUATION: CPT | Mod: GC

## 2025-04-08 RX ORDER — ACETAMINOPHEN 500 MG/5ML
1000 LIQUID (ML) ORAL ONCE
Refills: 0 | Status: DISCONTINUED | OUTPATIENT
Start: 2025-04-08 | End: 2025-04-08

## 2025-04-08 RX ORDER — DIAZEPAM 2 MG/1
30 TABLET ORAL THREE TIMES A DAY
Refills: 0 | Status: DISCONTINUED | OUTPATIENT
Start: 2025-04-08 | End: 2025-04-11

## 2025-04-08 RX ORDER — ACETAMINOPHEN 500 MG/5ML
1000 LIQUID (ML) ORAL ONCE
Refills: 0 | Status: COMPLETED | OUTPATIENT
Start: 2025-04-08 | End: 2025-04-08

## 2025-04-08 RX ADMIN — DOXAZOSIN MESYLATE 1 MILLIGRAM(S): 8 TABLET ORAL at 21:42

## 2025-04-08 RX ADMIN — Medication 1 APPLICATION(S): at 18:30

## 2025-04-08 RX ADMIN — IPRATROPIUM BROMIDE AND ALBUTEROL SULFATE 3 MILLILITER(S): .5; 2.5 SOLUTION RESPIRATORY (INHALATION) at 08:00

## 2025-04-08 RX ADMIN — DIAZEPAM 30 MILLIGRAM(S): 2 TABLET ORAL at 13:56

## 2025-04-08 RX ADMIN — Medication 650 MILLIGRAM(S): at 21:39

## 2025-04-08 RX ADMIN — MIDODRINE HYDROCHLORIDE 10 MILLIGRAM(S): 5 TABLET ORAL at 13:56

## 2025-04-08 RX ADMIN — Medication 650 MILLIGRAM(S): at 13:56

## 2025-04-08 RX ADMIN — Medication 40 MILLIGRAM(S): at 05:55

## 2025-04-08 RX ADMIN — Medication 1000 MILLIGRAM(S): at 06:23

## 2025-04-08 RX ADMIN — IPRATROPIUM BROMIDE AND ALBUTEROL SULFATE 3 MILLILITER(S): .5; 2.5 SOLUTION RESPIRATORY (INHALATION) at 03:42

## 2025-04-08 RX ADMIN — MIDODRINE HYDROCHLORIDE 10 MILLIGRAM(S): 5 TABLET ORAL at 05:23

## 2025-04-08 RX ADMIN — Medication 400 MILLIGRAM(S): at 18:30

## 2025-04-08 RX ADMIN — FOLIC ACID 1 MILLIGRAM(S): 1 TABLET ORAL at 11:35

## 2025-04-08 RX ADMIN — LEVETIRACETAM 500 MILLIGRAM(S): 10 INJECTION, SOLUTION INTRAVENOUS at 18:30

## 2025-04-08 RX ADMIN — PROPOFOL 26.6 MICROGRAM(S)/KG/MIN: 10 INJECTION, EMULSION INTRAVENOUS at 19:46

## 2025-04-08 RX ADMIN — Medication 1 APPLICATION(S): at 11:35

## 2025-04-08 RX ADMIN — Medication 1000 MILLIGRAM(S): at 19:00

## 2025-04-08 RX ADMIN — DIAZEPAM 20 MILLIGRAM(S): 2 TABLET ORAL at 05:22

## 2025-04-08 RX ADMIN — Medication 400 MILLIGRAM(S): at 05:53

## 2025-04-08 RX ADMIN — DIAZEPAM 30 MILLIGRAM(S): 2 TABLET ORAL at 21:39

## 2025-04-08 RX ADMIN — Medication 1 APPLICATION(S): at 05:55

## 2025-04-08 RX ADMIN — LEVETIRACETAM 500 MILLIGRAM(S): 10 INJECTION, SOLUTION INTRAVENOUS at 05:23

## 2025-04-08 RX ADMIN — MIDODRINE HYDROCHLORIDE 10 MILLIGRAM(S): 5 TABLET ORAL at 21:39

## 2025-04-08 RX ADMIN — Medication 650 MILLIGRAM(S): at 05:22

## 2025-04-08 RX ADMIN — IPRATROPIUM BROMIDE AND ALBUTEROL SULFATE 3 MILLILITER(S): .5; 2.5 SOLUTION RESPIRATORY (INHALATION) at 20:20

## 2025-04-08 RX ADMIN — Medication 15 MILLILITER(S): at 05:54

## 2025-04-08 RX ADMIN — Medication 15 MILLILITER(S): at 06:11

## 2025-04-08 RX ADMIN — Medication 40 MILLIGRAM(S): at 18:30

## 2025-04-08 RX ADMIN — Medication 15 MILLILITER(S): at 18:30

## 2025-04-08 RX ADMIN — IPRATROPIUM BROMIDE AND ALBUTEROL SULFATE 3 MILLILITER(S): .5; 2.5 SOLUTION RESPIRATORY (INHALATION) at 15:40

## 2025-04-08 NOTE — PROGRESS NOTE ADULT - SUBJECTIVE AND OBJECTIVE BOX
INTERVAL HPI/OVERNIGHT EVENTS:    SUBJECTIVE: Patient seen and examined at bedside.     ROS: All negative except as listed above.    VITAL SIGNS:  ICU Vital Signs Last 24 Hrs  T(C): 37.5 (08 Apr 2025 08:00), Max: 38.3 (08 Apr 2025 05:00)  T(F): 99.5 (08 Apr 2025 08:00), Max: 100.9 (08 Apr 2025 05:00)  HR: 101 (08 Apr 2025 08:01) (101 - 125)  BP: 122/68 (08 Apr 2025 08:00) (94/57 - 123/59)  BP(mean): 83 (08 Apr 2025 08:00) (68 - 84)  ABP: --  ABP(mean): --  RR: 23 (08 Apr 2025 08:00) (19 - 32)  SpO2: 100% (08 Apr 2025 08:01) (90% - 100%)    O2 Parameters below as of 08 Apr 2025 08:01  Patient On (Oxygen Delivery Method): ventilator          Mode: CPAP with PS, FiO2: 57, PEEP: 6, PS: 15, ITime: 0.8, MAP: 12, PIP: 22  Plateau pressure:   P/F ratio:     04-07 @ 07:01  -  04-08 @ 07:00  --------------------------------------------------------  IN: 755.9 mL / OUT: 780 mL / NET: -24.1 mL    04-08 @ 07:01  -  04-08 @ 08:56  --------------------------------------------------------  IN: 13.3 mL / OUT: 100 mL / NET: -86.7 mL      CAPILLARY BLOOD GLUCOSE      POCT Blood Glucose.: 117 mg/dL (08 Apr 2025 06:54)      ECG: reviewed.    PHYSICAL EXAM:    GENERAL: NAD, lying in bed comfortably  HEAD:  Atraumatic, normocephalic  EYES: EOMI, PERRLA, conjunctiva and sclera clear  NECK: Supple, trachea midline, no JVD  HEART: Regular rate and rhythm, no murmurs, rubs, or gallops  LUNGS: Unlabored respirations.  Clear to auscultation bilaterally, no crackles, wheezing, or rhonchi  ABDOMEN: Soft, nontender, nondistended, +BS  EXTREMITIES: 2+ peripheral pulses bilaterally, cap refill<2 secs. No clubbing, cyanosis, or edema  NERVOUS SYSTEM:  A&Ox3, following commands, moving all extremities, no focal deficits   SKIN: No rashes or lesions    MEDICATIONS:  MEDICATIONS  (STANDING):  acetaminophen   IVPB .. 1000 milliGRAM(s) IV Intermittent once  albuterol/ipratropium for Nebulization 3 milliLiter(s) Nebulizer every 6 hours  allopurinol 50 milliGRAM(s) Oral <User Schedule>  chlorhexidine 0.12% Liquid 15 milliLiter(s) Oral Mucosa every 12 hours  chlorhexidine 2% Cloths 1 Application(s) Topical daily  diazepam    Tablet 20 milliGRAM(s) Oral three times a day  doxazosin 1 milliGRAM(s) Oral at bedtime  folic acid 1 milliGRAM(s) Oral daily  influenza   Vaccine 0.5 milliLiter(s) IntraMuscular once  levETIRAcetam  Solution 500 milliGRAM(s) Oral two times a day  midodrine 10 milliGRAM(s) Oral every 8 hours  pantoprazole  Injectable 40 milliGRAM(s) IV Push every 12 hours  petrolatum Ophthalmic Ointment 1 Application(s) Both EYES two times a day  propofol Infusion 50 MICROgram(s)/kG/Min (26.6 mL/Hr) IV Continuous <Continuous>  sodium bicarbonate 650 milliGRAM(s) Oral three times a day    MEDICATIONS  (PRN):      ALLERGIES:  Allergies    penicillin (Pruritus)  hydroxyurea (Other)  piperacillin-tazobactam (Urticaria)  ceftriaxone (Anaphylaxis)    Intolerances        LABS:                        5.7    16.24 )-----------( 290      ( 08 Apr 2025 00:33 )             15.7     04-08    133[L]  |  93[L]  |  122[H]  ----------------------------<  94  4.5   |  15[L]  |  4.61[H]    Ca    8.9      08 Apr 2025 00:33  Phos  9.9     04-08  Mg     2.10     04-08    TPro  6.7  /  Alb  2.5[L]  /  TBili  16.5[H]  /  DBili  x   /  AST  104[H]  /  ALT  24  /  AlkPhos  269[H]  04-08    PT/INR - ( 08 Apr 2025 00:33 )   PT: 12.6 sec;   INR: 1.06 ratio         PTT - ( 08 Apr 2025 00:33 )  PTT:30.1 sec  Urinalysis Basic - ( 08 Apr 2025 00:33 )    Color: x / Appearance: x / SG: x / pH: x  Gluc: 94 mg/dL / Ketone: x  / Bili: x / Urobili: x   Blood: x / Protein: x / Nitrite: x   Leuk Esterase: x / RBC: x / WBC x   Sq Epi: x / Non Sq Epi: x / Bacteria: x      ABG:      vBG:  pH, Venous: 7.34 (04-08-25 @ 00:33)  pCO2, Venous: 32 mmHg (04-08-25 @ 00:33)  pO2, Venous: 63 mmHg (04-08-25 @ 00:33)  HCO3, Venous: 17 mmol/L (04-08-25 @ 00:33)    Micro:    Culture - Blood (collected 03-20-25 @ 10:04)  Source: Blood Blood-Peripheral  Final Report (03-25-25 @ 13:01):    No growth at 5 days        Culture - Sputum (collected 03-26-25 @ 11:45)  Source: ET Tube ET Tube  Gram Stain (03-26-25 @ 22:28):    Few Squamous epithelial cells per low power field    Moderate polymorphonuclear leukocytes per low power field    Few Yeast per oil power field  Final Report (03-28-25 @ 08:15):    Commensal lay consistent with body site    Culture - Sputum (collected 03-22-25 @ 17:15)  Source: ET Tube ET Tube  Gram Stain (03-22-25 @ 23:42):    Numerous polymorphonuclear leukocytes per low power field    Rare Squamous epithelial cells per low power field    No organisms seen per oil power field  Final Report (03-24-25 @ 06:52):    Commensal lay consistent with body site        RADIOLOGY & ADDITIONAL TESTS: Reviewed.   INTERVAL HPI/OVERNIGHT EVENTS: Patient febrile OVN (100.9F) and Hg =5.7 s/p 1 u pRBC (inappropriate response) and currently receiving another unit of pRBC. Increased secretions OVN with RVP, MRSA, and sputum culture sent and RVP came back negative.     SUBJECTIVE: Patient seen and examined at bedside. AAOx0 so no ROS elicited.    Tele: No events OVN    VITAL SIGNS:  ICU Vital Signs Last 24 Hrs  T(C): 37.5 (08 Apr 2025 08:00), Max: 38.3 (08 Apr 2025 05:00)  T(F): 99.5 (08 Apr 2025 08:00), Max: 100.9 (08 Apr 2025 05:00)  HR: 101 (08 Apr 2025 08:01) (101 - 125)  BP: 122/68 (08 Apr 2025 08:00) (94/57 - 123/59)  BP(mean): 83 (08 Apr 2025 08:00) (68 - 84)  ABP: --  ABP(mean): --  RR: 23 (08 Apr 2025 08:00) (19 - 32)  SpO2: 100% (08 Apr 2025 08:01) (90% - 100%)    O2 Parameters below as of 08 Apr 2025 08:01  Patient On (Oxygen Delivery Method): ventilator          Mode: CPAP with PS, FiO2: 57, PEEP: 6, PS: 15, ITime: 0.8, MAP: 12, PIP: 22  Plateau pressure:   P/F ratio:     04-07 @ 07:01  -  04-08 @ 07:00  --------------------------------------------------------  IN: 755.9 mL / OUT: 780 mL / NET: -24.1 mL    04-08 @ 07:01  -  04-08 @ 08:56  --------------------------------------------------------  IN: 13.3 mL / OUT: 100 mL / NET: -86.7 mL      CAPILLARY BLOOD GLUCOSE      POCT Blood Glucose.: 117 mg/dL (08 Apr 2025 06:54)      ECG: reviewed.    PHYSICAL EXAM:    GENERAL: NAD, lying in bed comfortably  HEAD:  Atraumatic, normocephalic  EYES: EOMI, PERRLA, conjunctiva and sclera clear  NECK: Supple, trachea midline, no JVD  HEART: Regular rate and rhythm, no murmurs, rubs, or gallops  LUNGS: Unlabored respirations.  Clear to auscultation bilaterally, no crackles, wheezing, or rhonchi  ABDOMEN: Soft, nontender, nondistended, +BS  EXTREMITIES: 2+ peripheral pulses bilaterally, cap refill<2 secs. No clubbing, cyanosis, or edema  NERVOUS SYSTEM:  A&Ox3, following commands, moving all extremities, no focal deficits   SKIN: No rashes or lesions    MEDICATIONS:  MEDICATIONS  (STANDING):  acetaminophen   IVPB .. 1000 milliGRAM(s) IV Intermittent once  albuterol/ipratropium for Nebulization 3 milliLiter(s) Nebulizer every 6 hours  allopurinol 50 milliGRAM(s) Oral <User Schedule>  chlorhexidine 0.12% Liquid 15 milliLiter(s) Oral Mucosa every 12 hours  chlorhexidine 2% Cloths 1 Application(s) Topical daily  diazepam    Tablet 20 milliGRAM(s) Oral three times a day  doxazosin 1 milliGRAM(s) Oral at bedtime  folic acid 1 milliGRAM(s) Oral daily  influenza   Vaccine 0.5 milliLiter(s) IntraMuscular once  levETIRAcetam  Solution 500 milliGRAM(s) Oral two times a day  midodrine 10 milliGRAM(s) Oral every 8 hours  pantoprazole  Injectable 40 milliGRAM(s) IV Push every 12 hours  petrolatum Ophthalmic Ointment 1 Application(s) Both EYES two times a day  propofol Infusion 50 MICROgram(s)/kG/Min (26.6 mL/Hr) IV Continuous <Continuous>  sodium bicarbonate 650 milliGRAM(s) Oral three times a day    MEDICATIONS  (PRN):      ALLERGIES:  Allergies    penicillin (Pruritus)  hydroxyurea (Other)  piperacillin-tazobactam (Urticaria)  ceftriaxone (Anaphylaxis)    Intolerances        LABS:                        5.7    16.24 )-----------( 290      ( 08 Apr 2025 00:33 )             15.7     04-08    133[L]  |  93[L]  |  122[H]  ----------------------------<  94  4.5   |  15[L]  |  4.61[H]    Ca    8.9      08 Apr 2025 00:33  Phos  9.9     04-08  Mg     2.10     04-08    TPro  6.7  /  Alb  2.5[L]  /  TBili  16.5[H]  /  DBili  x   /  AST  104[H]  /  ALT  24  /  AlkPhos  269[H]  04-08    PT/INR - ( 08 Apr 2025 00:33 )   PT: 12.6 sec;   INR: 1.06 ratio         PTT - ( 08 Apr 2025 00:33 )  PTT:30.1 sec  Urinalysis Basic - ( 08 Apr 2025 00:33 )    Color: x / Appearance: x / SG: x / pH: x  Gluc: 94 mg/dL / Ketone: x  / Bili: x / Urobili: x   Blood: x / Protein: x / Nitrite: x   Leuk Esterase: x / RBC: x / WBC x   Sq Epi: x / Non Sq Epi: x / Bacteria: x      ABG:      vBG:  pH, Venous: 7.34 (04-08-25 @ 00:33)  pCO2, Venous: 32 mmHg (04-08-25 @ 00:33)  pO2, Venous: 63 mmHg (04-08-25 @ 00:33)  HCO3, Venous: 17 mmol/L (04-08-25 @ 00:33)    Micro:    Culture - Blood (collected 03-20-25 @ 10:04)  Source: Blood Blood-Peripheral  Final Report (03-25-25 @ 13:01):    No growth at 5 days        Culture - Sputum (collected 03-26-25 @ 11:45)  Source: ET Tube ET Tube  Gram Stain (03-26-25 @ 22:28):    Few Squamous epithelial cells per low power field    Moderate polymorphonuclear leukocytes per low power field    Few Yeast per oil power field  Final Report (03-28-25 @ 08:15):    Commensal lay consistent with body site    Culture - Sputum (collected 03-22-25 @ 17:15)  Source: ET Tube ET Tube  Gram Stain (03-22-25 @ 23:42):    Numerous polymorphonuclear leukocytes per low power field    Rare Squamous epithelial cells per low power field    No organisms seen per oil power field  Final Report (03-24-25 @ 06:52):    Commensal lay consistent with body site        RADIOLOGY & ADDITIONAL TESTS: Reviewed.   INTERVAL HPI/OVERNIGHT EVENTS: Patient with slight temperature OVN (100.9F) and Hg =5.7 s/p 1 u pRBC (inappropriate response) and currently receiving another unit of pRBC. Increased secretions OVN with RVP, MRSA, and sputum culture sent and RVP came back negative.     SUBJECTIVE: Patient seen and examined at bedside. AAOx0 so no ROS elicited.    Tele: No events OVN    VITAL SIGNS:  ICU Vital Signs Last 24 Hrs  T(C): 37.5 (08 Apr 2025 08:00), Max: 38.3 (08 Apr 2025 05:00)  T(F): 99.5 (08 Apr 2025 08:00), Max: 100.9 (08 Apr 2025 05:00)  HR: 101 (08 Apr 2025 08:01) (101 - 125)  BP: 122/68 (08 Apr 2025 08:00) (94/57 - 123/59)  BP(mean): 83 (08 Apr 2025 08:00) (68 - 84)  ABP: --  ABP(mean): --  RR: 23 (08 Apr 2025 08:00) (19 - 32)  SpO2: 100% (08 Apr 2025 08:01) (90% - 100%)    O2 Parameters below as of 08 Apr 2025 08:01  Patient On (Oxygen Delivery Method): ventilator          Mode: CPAP with PS, FiO2: 57, PEEP: 6, PS: 15, ITime: 0.8, MAP: 12, PIP: 22  Plateau pressure:   P/F ratio:     04-07 @ 07:01  -  04-08 @ 07:00  --------------------------------------------------------  IN: 755.9 mL / OUT: 780 mL / NET: -24.1 mL    04-08 @ 07:01  -  04-08 @ 08:56  --------------------------------------------------------  IN: 13.3 mL / OUT: 100 mL / NET: -86.7 mL      CAPILLARY BLOOD GLUCOSE      POCT Blood Glucose.: 117 mg/dL (08 Apr 2025 06:54)      ECG: reviewed.    PHYSICAL EXAM:    General: NAD, normal habitus, mild jaundice   Neuro: AAOx0, sedated non responsive to noxious stimuli, closed eyes, no verbal sounds, 4 mm pupils, sluggishly reactive pupils b/l   HEENT: icteric sclerae b/l, tracheostomy clean/dry/intact  Chest: non-tender to palpation  Heart: S1/S2, RRR   Lungs: No adventitious sounds appreciated on auscultation  Abd: soft, non-tender, but slightly distended.  Ext: Generalized trace pitting edema, LUE wrapped, nodule on R 1st digit proximal phalanges   Pulses: radial 2+ b/l, dorsalis pedis 2+ b/l   Lines/tubes/drains: R femoral Shiley intact, NGT, Dave, & Flexiseal  Psych: unable to assess       MEDICATIONS:  MEDICATIONS  (STANDING):  acetaminophen   IVPB .. 1000 milliGRAM(s) IV Intermittent once  albuterol/ipratropium for Nebulization 3 milliLiter(s) Nebulizer every 6 hours  allopurinol 50 milliGRAM(s) Oral <User Schedule>  chlorhexidine 0.12% Liquid 15 milliLiter(s) Oral Mucosa every 12 hours  chlorhexidine 2% Cloths 1 Application(s) Topical daily  diazepam    Tablet 20 milliGRAM(s) Oral three times a day  doxazosin 1 milliGRAM(s) Oral at bedtime  folic acid 1 milliGRAM(s) Oral daily  influenza   Vaccine 0.5 milliLiter(s) IntraMuscular once  levETIRAcetam  Solution 500 milliGRAM(s) Oral two times a day  midodrine 10 milliGRAM(s) Oral every 8 hours  pantoprazole  Injectable 40 milliGRAM(s) IV Push every 12 hours  petrolatum Ophthalmic Ointment 1 Application(s) Both EYES two times a day  propofol Infusion 50 MICROgram(s)/kG/Min (26.6 mL/Hr) IV Continuous <Continuous>  sodium bicarbonate 650 milliGRAM(s) Oral three times a day    MEDICATIONS  (PRN):      ALLERGIES:  Allergies    penicillin (Pruritus)  hydroxyurea (Other)  piperacillin-tazobactam (Urticaria)  ceftriaxone (Anaphylaxis)    Intolerances        LABS:                        5.7    16.24 )-----------( 290      ( 08 Apr 2025 00:33 )             15.7     04-08    133[L]  |  93[L]  |  122[H]  ----------------------------<  94  4.5   |  15[L]  |  4.61[H]    Ca    8.9      08 Apr 2025 00:33  Phos  9.9     04-08  Mg     2.10     04-08    TPro  6.7  /  Alb  2.5[L]  /  TBili  16.5[H]  /  DBili  x   /  AST  104[H]  /  ALT  24  /  AlkPhos  269[H]  04-08    PT/INR - ( 08 Apr 2025 00:33 )   PT: 12.6 sec;   INR: 1.06 ratio         PTT - ( 08 Apr 2025 00:33 )  PTT:30.1 sec  Urinalysis Basic - ( 08 Apr 2025 00:33 )    Color: x / Appearance: x / SG: x / pH: x  Gluc: 94 mg/dL / Ketone: x  / Bili: x / Urobili: x   Blood: x / Protein: x / Nitrite: x   Leuk Esterase: x / RBC: x / WBC x   Sq Epi: x / Non Sq Epi: x / Bacteria: x      ABG:      vBG:  pH, Venous: 7.34 (04-08-25 @ 00:33)  pCO2, Venous: 32 mmHg (04-08-25 @ 00:33)  pO2, Venous: 63 mmHg (04-08-25 @ 00:33)  HCO3, Venous: 17 mmol/L (04-08-25 @ 00:33)    Micro:    Culture - Blood (collected 03-20-25 @ 10:04)  Source: Blood Blood-Peripheral  Final Report (03-25-25 @ 13:01):    No growth at 5 days        Culture - Sputum (collected 03-26-25 @ 11:45)  Source: ET Tube ET Tube  Gram Stain (03-26-25 @ 22:28):    Few Squamous epithelial cells per low power field    Moderate polymorphonuclear leukocytes per low power field    Few Yeast per oil power field  Final Report (03-28-25 @ 08:15):    Commensal lay consistent with body site    Culture - Sputum (collected 03-22-25 @ 17:15)  Source: ET Tube ET Tube  Gram Stain (03-22-25 @ 23:42):    Numerous polymorphonuclear leukocytes per low power field    Rare Squamous epithelial cells per low power field    No organisms seen per oil power field  Final Report (03-24-25 @ 06:52):    Commensal lay consistent with body site        RADIOLOGY & ADDITIONAL TESTS: Reviewed.

## 2025-04-08 NOTE — PROGRESS NOTE ADULT - SUBJECTIVE AND OBJECTIVE BOX
[facility] DIVISION OF KIDNEY DISEASES AND HYPERTENSION   FOLLOW UP NOTE    --------------------------------------------------------------------------------  Chief Complaint:    24 hour events/subjective: Pt. was seen and examined today in MICU. Remains sedated.      PAST HISTORY  --------------------------------------------------------------------------------  No significant changes to PMH, PSH, FHx, SHx, unless otherwise noted    ALLERGIES & MEDICATIONS  --------------------------------------------------------------------------------  Allergies    penicillin (Pruritus)  hydroxyurea (Other)  piperacillin-tazobactam (Urticaria)  ceftriaxone (Anaphylaxis)    Intolerances      Standing Inpatient Medications  acetaminophen   IVPB .. 1000 milliGRAM(s) IV Intermittent once  albuterol/ipratropium for Nebulization 3 milliLiter(s) Nebulizer every 6 hours  allopurinol 50 milliGRAM(s) Oral <User Schedule>  chlorhexidine 0.12% Liquid 15 milliLiter(s) Oral Mucosa every 12 hours  chlorhexidine 2% Cloths 1 Application(s) Topical daily  diazepam    Tablet 30 milliGRAM(s) Oral three times a day  doxazosin 1 milliGRAM(s) Oral at bedtime  folic acid 1 milliGRAM(s) Oral daily  influenza   Vaccine 0.5 milliLiter(s) IntraMuscular once  levETIRAcetam  Solution 500 milliGRAM(s) Oral two times a day  midodrine 10 milliGRAM(s) Oral every 8 hours  pantoprazole  Injectable 40 milliGRAM(s) IV Push every 12 hours  petrolatum Ophthalmic Ointment 1 Application(s) Both EYES two times a day  propofol Infusion 50 MICROgram(s)/kG/Min IV Continuous <Continuous>  sodium bicarbonate 650 milliGRAM(s) Oral three times a day    PRN Inpatient Medications      REVIEW OF SYSTEMS  --------------------------------------------------------------------------------  unable to obtain due to clinic status      VITALS/PHYSICAL EXAM  --------------------------------------------------------------------------------  T(C): 37.3 (04-08-25 @ 09:45), Max: 38.3 (04-08-25 @ 05:00)  HR: 120 (04-08-25 @ 10:30) (101 - 125)  BP: 116/66 (04-08-25 @ 10:00) (94/57 - 123/59)  RR: 23 (04-08-25 @ 10:00) (17 - 32)  SpO2: 100% (04-08-25 @ 10:30) (90% - 100%)  Wt(kg): --        04-07-25 @ 07:01  -  04-08-25 @ 07:00  --------------------------------------------------------  IN: 755.9 mL / OUT: 780 mL / NET: -24.1 mL    04-08-25 @ 07:01  -  04-08-25 @ 11:47  --------------------------------------------------------  IN: 13.3 mL / OUT: 100 mL / NET: -86.7 mL        Physical Exam:  	Gen: Ill appearing   	HEENT: Trached  	Pulm: CTA B/L  	CV: S1S2+  	Abd: Distended    	Ext: +edema  	Neuro: Sedated  	Skin: Warm and dry  	Dialysis access: Right femoral NTDC        LABS/STUDIES  --------------------------------------------------------------------------------              5.7    16.24 >-----------<  290      [04-08-25 @ 00:33]              15.7     133  |  93  |  122  ----------------------------<  94      [04-08-25 @ 00:33]  4.5   |  15  |  4.61        Ca     8.9     [04-08-25 @ 00:33]      iCa    1.15     [04-07 @ 00:24]      Mg     2.10     [04-08-25 @ 00:33]      Phos  9.9     [04-08-25 @ 00:33]    TPro  6.7  /  Alb  2.5  /  TBili  16.5  /  DBili  x   /  AST  104  /  ALT  24  /  AlkPhos  269  [04-08-25 @ 00:33]    PT/INR: PT 12.6 , INR 1.06       [04-08-25 @ 00:33]  PTT: 30.1       [04-08-25 @ 00:33]    Uric acid 8.6      [04-07-25 @ 00:24]        [04-07-25 @ 00:24]    Creatinine Trend:  SCr 4.61 [04-08 @ 00:33]  SCr 4.22 [04-07 @ 00:24]  SCr 3.88 [04-06 @ 00:15]  SCr 3.54 [04-05 @ 01:02]  SCr 3.06 [04-04 @ 00:35]    Urinalysis - [04-08-25 @ 00:33]      Color  / Appearance  / SG  / pH       Gluc 94 / Ketone   / Bili  / Urobili        Blood  / Protein  / Leuk Est  / Nitrite       RBC  / WBC  / Hyaline  / Gran  / Sq Epi  / Non Sq Epi  / Bacteria       HBsAb 50.9      [03-29-25 @ 07:15]  HBsAg Nonreact      [03-17-25 @ 15:26]  HBcAb Nonreact      [03-29-25 @ 07:15]  HCV 0.10, Nonreact      [03-17-25 @ 15:26]  HIV Nonreact      [03-17-25 @ 15:26]      Tacrolimus  Cyclosporine  Sirolimus  Mycophenolate  BK PCR  CMV PCRCMVPCR Log: NotDetec [id number]/mL (12-27 @ 05:38)    Parvo PCR  EBV PCR

## 2025-04-08 NOTE — PROGRESS NOTE ADULT - ATTENDING COMMENTS
1. Acute hypoxemic respiratory failure s/p cardiac arrest. Pt now with tracheostomy tube. Tolerating AC vent setting. Did not tolerate PS trial today.  2. Neuro: Anoxic brain injury. Pt with myoclonic seizures. Continue Keppra. Increase Valium to 20mg tid. . Taper off propofol as tolerated.  3. Surgical input for PEG/PEJ  4.ID. Pt started on meropenem for fevers.  Finished 5 day course of meropenem.  5.HEME. H/O ss anemia. Keep HB>6. Transfused 1 unit PRBC yesterday and today.   6. Renal: Acute renal failure s/p cardiac arrest. Likely ATN. Intermittent HD .  7. Renal bx with urothelial cancer.  8.DVT prophylaxis: SCD  9. GOC: Full code . Discussed with family that pt will never be independent and will live in nursing home for the rest of his life. One family member said he had discussed possibility and being very sick. "He would never want  to be a vegetable."   Trying to assemble a a family meeting or call.

## 2025-04-08 NOTE — ED ADULT TRIAGE NOTE - PATIENT ON (OXYGEN DELIVERY METHOD)
Medication: wellbutrin  Last office visit date: 10/30/24  Medication Refill Protocol Failed.  Failed criteria: can only be refilled by PCP. Sent to clinician to review.    
room air

## 2025-04-08 NOTE — PROGRESS NOTE ADULT - ASSESSMENT
44M with history of sickle cell disease with multiple crises, urothelial ca, HTN, RUE DVT on Eliquis (held since 3/15), admitted for anemia on outpatient blood work. Underwent L ureteroscopy, L kidney mass biopsy, and stent placement 3/19/25 c/b perinephric hemorrhagic shock with PEA arrest, ROSC after 4 minutes however unclear anoxic time; s/p IR embolization of left renal artery 3/20.   Unclear prognosis regarding neurologic recovery, per discussion with family at bedside "he said if he would be a vegetable to pull the plug" but "as long as there is a chance do everything." Per GI and IR no safe window for percutaneous/endoscopic placement due to hepatomegaly.   Surgery consulted for surgical feeding G or J tube placement.    RECCS  - formal neurologic exam off sedation  - resuscitate prn  - GOC discussion with family pending formal neurologic exam and prognosis    B Team Surgery  [id number]

## 2025-04-08 NOTE — PROGRESS NOTE ADULT - SUBJECTIVE AND OBJECTIVE BOX
SURGERY DAILY PROGRESS NOTE:     Overnight Events:  patient actively being resuscitated with blood for hb 5.7    SUBJECTIVE:   Patient seen and evaluated on AM rounds.   pt sedating on propofol s/p anoxic brain injury    OBJECTIVE:  Vital Signs Last 24 Hrs  T(C): 37.1 (2025 12:00), Max: 38.3 (2025 05:00)  T(F): 98.8 (2025 12:00), Max: 100.9 (2025 05:00)  HR: 116 (2025 12:00) (101 - 125)  BP: 94/65 (2025 12:00) (93/59 - 123/59)  BP(mean): 74 (2025 12:00) (65 - 83)  RR: 22 (2025 11:00) (17 - 32)  SpO2: 100% (2025 12:00) (90% - 100%)    Parameters below as of 2025 09:45  Patient On (Oxygen Delivery Method): ventilator      I&O's Detail    2025 07:01  -  2025 07:00  --------------------------------------------------------  IN:    IV PiggyBack: 50 mL    PRBCs (Packed Red Blood Cells): 400 mL    Propofol: 305.9 mL  Total IN: 755.9 mL    OUT:    Indwelling Catheter - Urethral (mL): 780 mL  Total OUT: 780 mL    Total NET: -24.1 mL      2025 07:01  -  2025 12:35  --------------------------------------------------------  IN:    Propofol: 66.5 mL  Total IN: 66.5 mL    OUT:    Indwelling Catheter - Urethral (mL): 185 mL  Total OUT: 185 mL    Total NET: -118.5 mL        Daily     Daily Weight in k.9 (2025 09:45)    LABS:                        5.7    16.24 )-----------( 290      ( 2025 00:33 )             15.7     04-08    133[L]  |  93[L]  |  122[H]  ----------------------------<  94  4.5   |  15[L]  |  4.61[H]    Ca    8.9      2025 00:33  Phos  9.9     -  Mg     2.10     -08    TPro  6.7  /  Alb  2.5[L]  /  TBili  16.5[H]  /  DBili  x   /  AST  104[H]  /  ALT  24  /  AlkPhos  269[H]  -08    PT/INR - ( 2025 00:33 )   PT: 12.6 sec;   INR: 1.06 ratio         PTT - ( 2025 00:33 )  PTT:30.1 sec  Urinalysis Basic - ( 2025 00:33 )    Color: x / Appearance: x / SG: x / pH: x  Gluc: 94 mg/dL / Ketone: x  / Bili: x / Urobili: x   Blood: x / Protein: x / Nitrite: x   Leuk Esterase: x / RBC: x / WBC x   Sq Epi: x / Non Sq Epi: x / Bacteria: x        PHYSICAL EXAM:  Constitutional: Well developed, well nourished, NAD  Pulmonary: Symmetric chest rise bilaterally, no increased WOB  Gastrointestinal: Abdomen soft, nontender, nondistended. No rebound or guarding. No obvious masses. Midline incisions with staples C/D/I. Wound incisions C/D/I. NGT with output. MISTY drain in w/ serosanguineous output. Ostomy pink with solid stool in ostomy bag. Gravity bag with minimal bilious drainage. Midline wound vac holding suction.   Groin: Soft, nontender, no ecchymosis/hematoma, no erythema, no edema.  Extremities:  FROM, warm to touch, no clubbing/cyanosis/erythema/edema       SURGERY DAILY PROGRESS NOTE:     Overnight Events:  patient actively being resuscitated with blood for hb 5.7    SUBJECTIVE:   Patient seen and evaluated on AM rounds.   pt sedating on propofol s/p anoxic brain injury    OBJECTIVE:  Vital Signs Last 24 Hrs  T(C): 37.1 (2025 12:00), Max: 38.3 (2025 05:00)  T(F): 98.8 (2025 12:00), Max: 100.9 (2025 05:00)  HR: 116 (2025 12:00) (101 - 125)  BP: 94/65 (2025 12:00) (93/59 - 123/59)  BP(mean): 74 (2025 12:00) (65 - 83)  RR: 22 (2025 11:00) (17 - 32)  SpO2: 100% (2025 12:00) (90% - 100%)    Parameters below as of 2025 09:45  Patient On (Oxygen Delivery Method): ventilator      I&O's Detail    2025 07:01  -  2025 07:00  --------------------------------------------------------  IN:    IV PiggyBack: 50 mL    PRBCs (Packed Red Blood Cells): 400 mL    Propofol: 305.9 mL  Total IN: 755.9 mL    OUT:    Indwelling Catheter - Urethral (mL): 780 mL  Total OUT: 780 mL    Total NET: -24.1 mL      2025 07:01  -  2025 12:35  --------------------------------------------------------  IN:    Propofol: 66.5 mL  Total IN: 66.5 mL    OUT:    Indwelling Catheter - Urethral (mL): 185 mL  Total OUT: 185 mL    Total NET: -118.5 mL        Daily     Daily Weight in k.9 (2025 09:45)    LABS:                        5.7    16.24 )-----------( 290      ( 2025 00:33 )             15.7     04-08    133[L]  |  93[L]  |  122[H]  ----------------------------<  94  4.5   |  15[L]  |  4.61[H]    Ca    8.9      2025 00:33  Phos  9.9     -  Mg     2.10     -    TPro  6.7  /  Alb  2.5[L]  /  TBili  16.5[H]  /  DBili  x   /  AST  104[H]  /  ALT  24  /  AlkPhos  269[H]  -08    PT/INR - ( 2025 00:33 )   PT: 12.6 sec;   INR: 1.06 ratio         PTT - ( 2025 00:33 )  PTT:30.1 sec  Urinalysis Basic - ( 2025 00:33 )    Color: x / Appearance: x / SG: x / pH: x  Gluc: 94 mg/dL / Ketone: x  / Bili: x / Urobili: x   Blood: x / Protein: x / Nitrite: x   Leuk Esterase: x / RBC: x / WBC x   Sq Epi: x / Non Sq Epi: x / Bacteria: x        PHYSICAL EXAM:  Constitutional: sedated on propofol gtt  Pulmonary: intubated  Gastrointestinal: Abdomen soft, nontender, distended. No facial grimace to palpation. No obvious masses. flexiseal in place with dark brown stool  Groin: daniel with dark urine  Neuro: sedated on propofol gtt, no blink to threat, no movement to painful stimuli nail press in all 4 extremities

## 2025-04-08 NOTE — GOALS OF CARE CONVERSATION - ADVANCED CARE PLANNING - CONVERSATION DETAILS
Spoke to patient's brother (Bala Downey), about patient's prognosis and about quality of life. Patient's brother is aware that patient had anoxic brain injury and will not be functional, will most likely not be able to perform ADLs independently, will remain AAOxO (new baseline), and will require nursing home vs LTC given that patient will only have some reflexes, but will be unable to communicate. Brother states that he is aware of his brother's condition and diagnosis and wishes to give his brother a fighting chance, per the patient's request and wants full code and wants to pursue all treatment options, including surgical enteral tube placement. Notified that patient is too frail and would not be able to tolerate treatment for urothelial cancer and patient's brother is aware but still wants patient to get tube placement, aaron knowing it will not change his quality of life (except give him access to nutrition). Patient's brother requesting time to discuss with family members and process emotions of his brother's new baseline.

## 2025-04-08 NOTE — CHART NOTE - NSCHARTNOTEFT_GEN_A_CORE
: Chato Magdaleno    PROCEDURE:  [ ] LIMITED ECHO  [x] LIMITED CHEST  [ ] LIMITED RETROPERITONEAL  [ ] LIMITED ABDOMINAL  [ ] LIMITED DVT  [ ] NEEDLE GUIDANCE VASCULAR  [ ] NEEDLE GUIDANCE THORACENTESIS  [ ] NEEDLE GUIDANCE PARACENTESIS  [ ] NEEDLE GUIDANCE PERICARDIOCENTESIS  [ ] OTHER    FINDINGS/INTERPRETATION:  Lungs  Bilateral lung sliding  B-line predominant  Focal aea of consolidation on right anterior chest  Translobar consolidation noted along right mid-axillary line    Heart  RV/LV size normal  RA/LA size normal  LVSF normal  RVSF normal : Chato Magdaleno    INDICATION: AHRF    PROCEDURE:  [ ] LIMITED ECHO  [x] LIMITED CHEST  [ ] LIMITED RETROPERITONEAL  [ ] LIMITED ABDOMINAL  [ ] LIMITED DVT  [ ] NEEDLE GUIDANCE VASCULAR  [ ] NEEDLE GUIDANCE THORACENTESIS  [ ] NEEDLE GUIDANCE PARACENTESIS  [ ] NEEDLE GUIDANCE PERICARDIOCENTESIS  [ ] OTHER    FINDINGS/INTERPRETATION:  Lungs  Bilateral lung sliding  B-line predominant  Focal aea of consolidation on right anterior chest  Translobar consolidation noted along right mid-axillary line    Heart  RV/LV size normal  RA/LA size normal  LVSF normal  RVSF normal : Chato Magdaleno    INDICATION: AHRF    PROCEDURE:  [ ] LIMITED ECHO  [x] LIMITED CHEST  [ ] LIMITED RETROPERITONEAL  [ ] LIMITED ABDOMINAL  [ ] LIMITED DVT  [ ] NEEDLE GUIDANCE VASCULAR  [ ] NEEDLE GUIDANCE THORACENTESIS  [ ] NEEDLE GUIDANCE PARACENTESIS  [ ] NEEDLE GUIDANCE PERICARDIOCENTESIS  [ ] OTHER    FINDINGS/INTERPRETATION:  Lungs  Bilateral lung sliding  B-line predominant  Focal area of consolidation on right anterior chest  Translobar consolidation noted along right mid-axillary line    Heart  RV/LV size normal  RA/LA size normal  LVSF normal  RVSF normal : Chato Magdaleno    INDICATION: AHRF    PROCEDURE:  [ ] LIMITED ECHO  [x] LIMITED CHEST  [ ] LIMITED RETROPERITONEAL  [ ] LIMITED ABDOMINAL  [ ] LIMITED DVT  [ ] NEEDLE GUIDANCE VASCULAR  [ ] NEEDLE GUIDANCE THORACENTESIS  [ ] NEEDLE GUIDANCE PARACENTESIS  [ ] NEEDLE GUIDANCE PERICARDIOCENTESIS  [ ] OTHER    FINDINGS/INTERPRETATION:  Lungs  Bilateral lung sliding  B-line predominant  Focal area of consolidation on right anterior chest  Translobar consolidation noted when scanning along right mid-axillary line    Heart  RV/LV size normal  RA/LA size normal  LVSF normal  RVSF normal : Chato Rasta    INDICATION: AHRF    PROCEDURE:  [ ] LIMITED ECHO  [x] LIMITED CHEST  [ ] LIMITED RETROPERITONEAL  [ ] LIMITED ABDOMINAL  [ ] LIMITED DVT  [ ] NEEDLE GUIDANCE VASCULAR  [ ] NEEDLE GUIDANCE THORACENTESIS  [ ] NEEDLE GUIDANCE PARACENTESIS  [ ] NEEDLE GUIDANCE PERICARDIOCENTESIS  [ ] OTHER    FINDINGS/INTERPRETATION:  Lungs  Bilateral lung sliding  B-line predominant  Focal area of consolidation on right anterior chest  Translobar consolidation noted when scanning along right mid-axillary line    Heart  RV/LV size normal  RA/LA size normal  LVSF normal  RVSF normal    I was supervised and assisted by Dr. Murphy : Chato Rasta    INDICATION: AHRF    PROCEDURE:  [ ] LIMITED ECHO  [x] LIMITED CHEST  [ ] LIMITED RETROPERITONEAL  [ ] LIMITED ABDOMINAL  [ ] LIMITED DVT  [ ] NEEDLE GUIDANCE VASCULAR  [ ] NEEDLE GUIDANCE THORACENTESIS  [ ] NEEDLE GUIDANCE PARACENTESIS  [ ] NEEDLE GUIDANCE PERICARDIOCENTESIS  [ ] OTHER    FINDINGS/INTERPRETATION:  Lungs  Bilateral lung sliding  B-line predominant  Focal area of consolidation on right anterior chest  Translobar consolidation noted when scanning along right mid-axillary line    Heart  RVSF and LVSF appear normal    I was supervised and assisted by Dr. Murphy    images stored in [employer] : Chato Rasta    INDICATION: AHRF    PROCEDURE:  [ ] LIMITED ECHO  [x] LIMITED CHEST  [ ] LIMITED RETROPERITONEAL  [ ] LIMITED ABDOMINAL  [ ] LIMITED DVT  [ ] NEEDLE GUIDANCE VASCULAR  [ ] NEEDLE GUIDANCE THORACENTESIS  [ ] NEEDLE GUIDANCE PARACENTESIS  [ ] NEEDLE GUIDANCE PERICARDIOCENTESIS  [ ] OTHER    FINDINGS/INTERPRETATION:    Lungs  Bilateral lung sliding  B-line predominant  Focal area of consolidation on right anterior chest  Translobar consolidation noted when scanning along right mid-axillary line    Heart  RVSF and LVSF appear normal    I was supervised and assisted by Dr. Murphy    images stored in [employer] : Chato Rasta    INDICATION: AHRF    PROCEDURE:  [ ] LIMITED ECHO  [x] LIMITED CHEST  [ ] LIMITED RETROPERITONEAL  [ ] LIMITED ABDOMINAL  [ ] LIMITED DVT  [ ] NEEDLE GUIDANCE VASCULAR  [ ] NEEDLE GUIDANCE THORACENTESIS  [ ] NEEDLE GUIDANCE PARACENTESIS  [ ] NEEDLE GUIDANCE PERICARDIOCENTESIS  [ ] OTHER    FINDINGS/INTERPRETATION:  Lungs  Bilateral lung sliding  B-line predominant  Focal area of consolidation on right anterior chest  Translobar consolidation noted when scanning along right mid-axillary line    Heart  RVSF and LVSF appear normal    I was supervised and assisted by Dr. Murphy    images stored in [employer] : Chato Rasta    INDICATION: AHRF    PROCEDURE:  [ ] LIMITED ECHO  [x] LIMITED CHEST  [ ] LIMITED RETROPERITONEAL  [ ] LIMITED ABDOMINAL  [ ] LIMITED DVT  [ ] NEEDLE GUIDANCE VASCULAR  [ ] NEEDLE GUIDANCE THORACENTESIS  [ ] NEEDLE GUIDANCE PARACENTESIS  [ ] NEEDLE GUIDANCE PERICARDIOCENTESIS  [ ] OTHER    FINDINGS/INTERPRETATION:  Lungs  Bilateral lung sliding  B-line predominant  Focal area of consolidation on right anterior chest  Translobar consolidation noted when scanning along right mid-axillary line    Heart  RVSF and LVSF appear normal    I was supervised and assisted by Dr. Murphy    images stored in [employer] : Chato Rasta    INDICATION: AHRF    PROCEDURE:  [ ] LIMITED ECHO  [x] LIMITED CHEST  [ ] LIMITED RETROPERITONEAL  [ ] LIMITED ABDOMINAL  [ ] LIMITED DVT  [ ] NEEDLE GUIDANCE VASCULAR  [ ] NEEDLE GUIDANCE THORACENTESIS  [ ] NEEDLE GUIDANCE PARACENTESIS  [ ] NEEDLE GUIDANCE PERICARDIOCENTESIS  [ ] OTHER    FINDINGS/INTERPRETATION:  Lungs  Bilateral lung sliding  B-line predominant  Focal area of consolidation on right anterior chest  Translobar consolidation noted when scanning along right mid-axillary line    Heart  RVSF and LVSF appear grossly normal    I was supervised and assisted by Dr. Murphy    images stored in [employer] : Chato Magdaleno    INDICATION: AHRF    PROCEDURE:  [ ] LIMITED ECHO  [x] LIMITED CHEST  [ ] LIMITED RETROPERITONEAL  [ ] LIMITED ABDOMINAL  [ ] LIMITED DVT  [ ] NEEDLE GUIDANCE VASCULAR  [ ] NEEDLE GUIDANCE THORACENTESIS  [ ] NEEDLE GUIDANCE PARACENTESIS  [ ] NEEDLE GUIDANCE PERICARDIOCENTESIS  [ ] OTHER    FINDINGS/INTERPRETATION:  Lungs  Bilateral lung sliding  B-line predominant  Focal area of consolidation on right anterior chest  Translobar consolidation noted when scanning along right mid-axillary line    Heart  RVSF and LVSF are normal    I was supervised and assisted by Dr. Murphy.    images stored in [employer]    I have assisted and supervised entire procedure.     Lucien Aguero MD

## 2025-04-08 NOTE — PROGRESS NOTE ADULT - PROBLEM SELECTOR PLAN 1
Pt w/ oliguric MATA iso hemorrhagic shock and PEA arrest. Likely ATN. UA (3/16) w/ proteinuria and hematuria (21 RBC). Renal US (3/20) w/o hydronephrosis, and w/ large left renal subcapsular hematoma. CXR (3/22) w/ b/l hazy opacities. MRI w/ global hypoxic injury.   -NTDC placed and underwent HD on 3/24 iso worsening oliguric renal failure, w/ associated hyperkalemia and hypervolemia. Pt was anuric despite prior diuretic challenge. IJ NTDC removed 3/31 after HD on 3/29 as pt was non-oliguric and Cr plateaued to ~5.1-5.4. However, labs on 4/3 revealed elevated  and acidotic SCO2 14. Since pt planned for trach/PEG on 4/4, HD was done on 4/3 to optimize him for trach/peg (4/4) via new right femoral NTDC placed on 4/2.     -Pt non-oliguric, UOP 780cc/24hr. Renal fx worse, will do HD today and will need chronic HD TIW. Please get IR for tunneled HD cath placement   -Transfuse as needed.      Eldon Hamilton  Nephrology Fellow  Feel free to contact me on TEAMS  After 5 pm and on weekends please contact the on-call Fellow.

## 2025-04-08 NOTE — PROGRESS NOTE ADULT - ASSESSMENT
· Assessment	  Assessment and Plan: 	  Patient is 44y Male with PMHx of sickle cell disease admitted for anemia concerning for sickle cell crisis s/p ureteroscopy w/ L kidney biopsy 3/19, post-operative course c/b acute hemorrhagic shock, PEA arrest with ROSC; currently with acute renal failure on HD and global anoxic brain injury and urothelial cancer; s/p tracheostomy pending possible G/J tube placement by surgery given no safe endoscopic window for PEG placement. Pending GOC discussions for further interventions.      NEURO:  #Global anoxic brain injury   AAOx0. On sedation with no further myoclonus. Guarded prognosis.   - Wean propofol as tolerated & C/w valium to 20 mg TID.  - D/marjan fenatnyl   - S/p versed gtt, weaned off 3/23  - Repeat CT B 3/24 showing diffuse sulcal effacement with increased intracrnail pressure, and few patchy foci of cortical blurring   - 3/25 MRIB with diffuse global abnormal supratentorial cortical restricted diffusion consistent with global hypoxic injury   -04/08/25: Patient still AAOx O with no response to voice or noxious stimuli    #Seizures  No further myoclonus of upper body after restarting propofol.   - Witness myoclonic jerking concerning for seizures iso anoxic brain injury  - s/p 2.5g keppra load & midazolam 2mg IV q8 PRN   - vEEG report: "Abundant myoclonic seizures in the setting of a severe diffuse/multifocal cerebral dysfunction which can be seen in the setting of sedative effect or due to an anoxic injury, with improvement after 20:50 suggesting a lowering of sedation"; will f/u with neuro recommendations. Repeat vEEG 3/24 showing severe cerebral dysfunction   -C/w levetiracetam 500 mg po BID & Diazepam 20 mg po TID (increased). Attempt to wean off propofol.  -Trend enolase   -Reach out to neurology about long-term myoclonus suppressive medication    CV:  #Shock - hemorrhagic shock s/p kidney biopsy s/p 5u pRBC, 3 plasma, 3 plt  last dose of lovenox AC on 3/18 at 5PM   Hgb 8 -> ~3.  - Not on levophed, MAP goal>65  - Monitor cbc q24  - Midodrine 10mg TID & trend CBC daily for Hg goal     #Hx of VTE (R IJ thrombus, L brachial DVT)  - CTM, hold AC given hemorrhage  - Bullae present on arm with DVT  - Appreciate orthopedics hand surgery consult, not compartment syndrome       PULM:  #Acute hypoxic respiratory failure  #Pna- 2/2 ventilation requirement  #Tracheostomy   In the setting of PEA arrest. Triggering ventilator on CPAP. Still requiring breathing support.   CXR with R lung field and left mid and lower lung field hazy opacities.  - Wean vent as able  - continue with duonebs Q6H & s/p HTS  - 3/28 CXR showing increasing RUL consolidation   -4/4 bedside tracheostomy   -S/p Abx completion course as below and currently on meropenem 500 mg Q24H (04/02 - 04/07)    RENAL:  #Acute renal failure.   #Renal mass s/p biopsy -   #Acidosis- RESOLVED   Oliguric & receiving Bumex IVP PRN per nephro recs  Acute renal failure s/p cardiac arrest most likely 2/2 ATN requiring intermittent HD with nephro following  - TOV 3/28, failed; replaced daniel on 4/1; Attempt TOV (04/08/25)  - C/w doxazoin 1 mg po qhs to optimize subsequent TOV trial   - Trend Cr, uop, lytes  - S/p bicarb drip  - Per nephro, HD session #1 3/24, HD sessions #2 3/25, HD session #3 3/27; planning HD #4 3/29; 4/3 HD #5; will monitor off HD. HD sessions per nephro   -C/w sodium bicarbonate 650 mg po TID   - 3/31 removed L IJV shiley; 4/2 placed R fem shiley    - Per pathology result of renal biopsy, noninvasive urothelial malignancy     GI:  #Shock liver  Unchanged  LFTs. Jaundice with hyperbilirubinemia (direct).   - Trend liver enzymes  - RUQ US showing enlarged periportal and periaortic enlarged lymph nodes and enlarged left lateral liver lobe and CBD 9 mm.     #Occult blood in feces  + Occult blood with dropping Hgb daily below goal of 6 ( Hx of SS). Patient with melena during hospital stay requiring pRBC to meet goal and with inappropriate Hgb response.  S/p 1 uPRBC 4/1, 1 uPRBC 4/2, 1 uPRBC 4/5, uPRBC 4/7, & uPRBC 4/8 since beginning of April  GI consulted  Maintain active T & S  C/w PPI 40 mg IV BID for now.    #Diet:  - NPO except meds   - GI unable to place PEG due to hepatomegaly.   - Per IR consult: Given no safe window for endoscopic placement, recommend surgical consultation.   - Surgery consulted & following      HEMATOLOGIC/ONCOLGOGIC   #Sickle cell crisis  #Acute blood loss anemia- 2/2 sickle cells and uremia vs critical illness vs GIB  Downtrending direct hyperbilirubinemia,  gradually & Increasing reticulocyte count.   - Heme following   -D/c deferasirox due to current renal failure   -S/p 1 uPRBC 4/1, 1 uPRBC 4/2, 1 uPRBC 4/5, uPRBC 4/7, & uPRBC 4/8 since beginning of April  - Occult blood positive. C/w PPI 40 mg IV BID for now.  - Transfuse as needed Hb >6 goal per Heme.    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 CXR showing increased RUL opacification.   - D/c  aztreonam 2000 mg IV qd ( started 3/22- 3/27)   - S/p vancomycin 1500 mg IV qd (4/2); 2nd dose 4/4, 3rd dose 4/5  - 4/2- CXR showing increasing RUL consolidation  - C/w 5d meropenem 500 mg IV qd (started 3/27-3/31); (4/2-4/7). Hold off Meropenem after 5 day course     MSK:  #infiltration  Infiltration of sodium bicarb into left arm, now with arm distension and bullae. Elevated uric acid with nodule suggegstive of podagra however no signs of active gout flare.   -Appreciate orthopedic hand surgery, not compartment syndrome   - CTM    Ethics: Full Code   Pending: G/J-tube placement for feeding by surgery team.  Palliative Team: Consulted   · Assessment	  Assessment and Plan: 	  Patient is 44y Male with PMHx of sickle cell disease admitted for anemia concerning for sickle cell crisis s/p ureteroscopy w/ L kidney biopsy 3/19, post-operative course c/b acute hemorrhagic shock, PEA arrest with ROSC; currently with acute renal failure on HD and global anoxic brain injury and urothelial cancer; s/p tracheostomy pending possible G/J tube placement by surgery given no safe endoscopic window for PEG placement. Pending GOC discussions for further interventions.      NEURO:  #Global anoxic brain injury   AAOx0. On sedation with no further myoclonus. Guarded prognosis.   - Wean propofol as tolerated & increase valium to 30 mg TID.  - D/marjan fenatnyl   - S/p versed gtt, weaned off 3/23  - Repeat CT B 3/24 showing diffuse sulcal effacement with increased intracrnail pressure, and few patchy foci of cortical blurring   - 3/25 MRIB with diffuse global abnormal supratentorial cortical restricted diffusion consistent with global hypoxic injury   -04/08/25: Patient still AAOx O with no response to voice or noxious stimuli    #Seizures  No further myoclonus of upper body after restarting propofol.   - Witness myoclonic jerking concerning for seizures iso anoxic brain injury  - s/p 2.5g keppra load & midazolam 2mg IV q8 PRN   - vEEG report: "Abundant myoclonic seizures in the setting of a severe diffuse/multifocal cerebral dysfunction which can be seen in the setting of sedative effect or due to an anoxic injury, with improvement after 20:50 suggesting a lowering of sedation"; will f/u with neuro recommendations. Repeat vEEG 3/24 showing severe cerebral dysfunction   -C/w levetiracetam 500 mg po BID & Diazepam 30 mg po TID (increased). Attempt to wean off propofol.  -Trend enolase   -Reach out to neurology about long-term myoclonus suppressive medication    CV:  #Shock - hemorrhagic shock s/p kidney biopsy s/p 5u pRBC, 3 plasma, 3 plt  last dose of lovenox AC on 3/18 at 5PM   Hgb 8 -> ~3.  - Not on levophed, MAP goal>65  - Monitor cbc q24  - Midodrine 10mg TID & trend CBC daily for Hg goal     #Hx of VTE (R IJ thrombus, L brachial DVT)  - CTM, hold AC given hemorrhage  - Bullae present on arm with DVT  - Appreciate orthopedics hand surgery consult, not compartment syndrome       PULM:  #Acute hypoxic respiratory failure  #Pna- 2/2 ventilation requirement  #Tracheostomy   In the setting of PEA arrest. Triggering ventilator on CPAP. Still requiring breathing support.   CXR with R lung field and left mid and lower lung field hazy opacities.  - Wean vent as able  - continue with duonebs Q6H & s/p HTS  - 3/28 CXR showing increasing RUL consolidation   -4/4 bedside tracheostomy   -S/p Abx completion course as below and currently on meropenem 500 mg Q24H (04/02 - 04/07)    RENAL:  #Acute renal failure.   #Renal mass s/p biopsy -   #Acidosis- RESOLVED   Oliguric & receiving Bumex IVP PRN per nephro recs  Acute renal failure s/p cardiac arrest most likely 2/2 ATN requiring intermittent HD with nephro following  - TOV 3/28, failed; replaced daniel on 4/1; Attempt TOV (04/08/25)  - C/w doxazoin 1 mg po qhs to optimize subsequent TOV trial   - Trend Cr, uop, lytes  - S/p bicarb drip  - Per nephro, HD session #1 3/24, HD sessions #2 3/25, HD session #3 3/27; planning HD #4 3/29; 4/3 HD #5; will monitor off HD. HD sessions per nephro   -C/w sodium bicarbonate 650 mg po TID   - 3/31 removed L IJV shiley; 4/2 placed R fem shiley    - Per pathology result of renal biopsy, noninvasive urothelial malignancy     GI:  #Shock liver  Unchanged  LFTs. Jaundice with hyperbilirubinemia (direct).   - Trend liver enzymes  - RUQ US showing enlarged periportal and periaortic enlarged lymph nodes and enlarged left lateral liver lobe and CBD 9 mm.     #Occult blood in feces  + Occult blood with dropping Hgb daily below goal of 6 ( Hx of SS). Patient with melena during hospital stay requiring pRBC to meet goal and with inappropriate Hgb response.  S/p 1 uPRBC 4/1, 1 uPRBC 4/2, 1 uPRBC 4/5, uPRBC 4/7, & uPRBC 4/8 since beginning of April  GI consulted  Maintain active T & S  C/w PPI 40 mg IV BID for now.    #Diet:  - NPO except meds   - GI unable to place PEG due to hepatomegaly.   - Per IR consult: Given no safe window for endoscopic placement, recommend surgical consultation.   - Surgery consulted & following      HEMATOLOGIC/ONCOLGOGIC   #Sickle cell crisis  #Acute blood loss anemia- 2/2 sickle cells and uremia vs critical illness vs GIB  Downtrending direct hyperbilirubinemia,  gradually & Increasing reticulocyte count.   - Heme following   -D/c deferasirox due to current renal failure   -S/p 1 uPRBC 4/1, 1 uPRBC 4/2, 1 uPRBC 4/5, uPRBC 4/7, & uPRBC 4/8 since beginning of April  - Occult blood positive. C/w PPI 40 mg IV BID for now.  - Transfuse as needed Hb >6 goal per Heme.    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 CXR showing increased RUL opacification.   - D/c  aztreonam 2000 mg IV qd ( started 3/22- 3/27)   - S/p vancomycin 1500 mg IV qd (4/2); 2nd dose 4/4, 3rd dose 4/5  - 4/2- CXR showing increasing RUL consolidation  - C/w 5d meropenem 500 mg IV qd (started 3/27-3/31); (4/2-4/7). Hold off Meropenem after 5 day course     MSK:  #infiltration  Infiltration of sodium bicarb into left arm, now with arm distension and bullae. Elevated uric acid with nodule suggegstive of podagra however no signs of active gout flare.   -Appreciate orthopedic hand surgery, not compartment syndrome   - CTM    Ethics: Full Code   Pending: G/J-tube placement for feeding by surgery team.  Palliative Team: Consulted   · Assessment	  Assessment and Plan: 	  Patient is 44y Male with PMHx of sickle cell disease admitted for anemia concerning for sickle cell crisis s/p ureteroscopy w/ L kidney biopsy 3/19, post-operative course c/b acute hemorrhagic shock, PEA arrest with ROSC; currently with acute renal failure on HD and global anoxic brain injury and urothelial cancer; s/p tracheostomy pending possible G/J tube placement by surgery given no safe endoscopic window for PEG placement. Pending GOC discussions for further interventions.      NEURO:  #Global anoxic brain injury   AAOx0. On sedation with no further myoclonus. Guarded prognosis.   - Wean propofol as tolerated & increase valium to 30 mg TID.  - D/marjan fenatnyl   - S/p versed gtt, weaned off 3/23  - Repeat CT B 3/24 showing diffuse sulcal effacement with increased intracrnail pressure, and few patchy foci of cortical blurring   - 3/25 MRIB with diffuse global abnormal supratentorial cortical restricted diffusion consistent with global hypoxic injury   -04/08/25: Patient still AAOx O with no response to voice or noxious stimuli    #Seizures  No further myoclonus of upper body after restarting propofol.   - Witness myoclonic jerking concerning for seizures iso anoxic brain injury  - s/p 2.5g keppra load & midazolam 2mg IV q8 PRN   - vEEG report: "Abundant myoclonic seizures in the setting of a severe diffuse/multifocal cerebral dysfunction which can be seen in the setting of sedative effect or due to an anoxic injury, with improvement after 20:50 suggesting a lowering of sedation"; will f/u with neuro recommendations. Repeat vEEG 3/24 showing severe cerebral dysfunction   -C/w levetiracetam 500 mg po BID & Diazepam 30 mg po TID (increased). Attempt to wean off propofol.  -Reach out to neurology about long-term myoclonus suppressive medication    CV:  #Shock - hemorrhagic shock s/p kidney biopsy s/p 5u pRBC, 3 plasma, 3 plt  last dose of lovenox AC on 3/18 at 5PM   Hgb 8 -> ~3.  Current Hg = 5-6  - Not on levophed, MAP goal>65  - Monitor cbc q24  - Midodrine 10mg TID & trend CBC daily for Hg goal   - Hg goal >6 per heme/onc    #Hx of VTE (R IJ thrombus, L brachial DVT)  - CTM, hold AC given hemorrhage  - Bullae present on arm with DVT  - Appreciate orthopedics hand surgery consult, not compartment syndrome       PULM:  #Acute hypoxic respiratory failure  #Pna- 2/2 ventilation requirement  #Tracheostomy   In the setting of PEA arrest. Triggering ventilator on CPAP. Still requiring breathing support.   CXR with R lung field and left mid and lower lung field hazy opacities.  - Wean vent as able  - continue with duonebs Q6H & s/p HTS  - 3/28 CXR showing increasing RUL consolidation   -4/4 bedside tracheostomy   -S/p Abx completion course as below and currently on meropenem 500 mg Q24H (04/02 - 04/07)    RENAL:  #Acute renal failure.   #Renal mass s/p biopsy -   #Acidosis- RESOLVED   Oliguric & receiving Bumex IVP PRN per nephro recs  Acute renal failure s/p cardiac arrest most likely 2/2 ATN requiring intermittent HD with nephro following  - TOV 3/28, failed; replaced daniel on 4/1; Attempt TOV (04/08/25)  - C/w doxazoin 1 mg po qhs to optimize subsequent TOV trial   - Trend Cr, uop, lytes  - S/p bicarb drip completion  -C/w sodium bicarbonate 650 mg po TID   - 3/31 removed L IJV shiley; 4/2 placed R fem shiley    - Per pathology result of renal biopsy, noninvasive urothelial malignancy   - Per nephro, HD session #1 3/24, HD sessions #2 3/25, HD session #3 3/27; planning HD #4 3/29; 4/3 HD #5; will monitor off HD. HD sessions per nephro. Will remove R. femoral line after HD session and follow up blood cxs 48 hrs after removal. Patient will need HD access after removal of femoral shiley.       GI:  #Shock liver  Unchanged  LFTs. Jaundice with hyperbilirubinemia (direct).   - Trend liver enzymes  - RUQ US showing enlarged periportal and periaortic enlarged lymph nodes and enlarged left lateral liver lobe and CBD 9 mm.     #Occult blood in feces  + Occult blood with dropping Hgb daily below goal of 6 ( Hx of SS). Patient with melena during hospital stay requiring pRBC to meet goal and with inappropriate Hgb response.  S/p 1 uPRBC 4/1, 1 uPRBC 4/2, 1 uPRBC 4/5, uPRBC 4/7, & uPRBC 4/8 since beginning of April  Hold off on GI consult  Maintain active T & S  C/w PPI 40 mg IV BID for now.    #Diet:  - NPO except meds   - GI unable to place PEG due to hepatomegaly.   - Per IR consult: Given no safe window for endoscopic placement, recommend surgical consultation.   - Surgery consulted & following. Pending further GOCs discussion.      HEMATOLOGIC/ONCOLGOGIC   #Sickle cell crisis--Resolved  #Acute blood loss anemia- 2/2 sickle cells and uremia vs critical illness vs GIB  Downtrending direct hyperbilirubinemia,  gradually & Increasing reticulocyte count.   - Heme following & recs appreciated  -D/c deferasirox due to current renal failure   -S/p 1 uPRBC 4/1, 1 uPRBC 4/2, 1 uPRBC 4/5, uPRBC 4/7, & uPRBC 4/8 since beginning of April  - Occult blood positive. C/w PPI 40 mg IV BID for now.  - Transfuse as needed Hb >6 goal per Heme.    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 CXR showing increased RUL opacification.   - D/c  aztreonam 2000 mg IV qd ( started 3/22- 3/27)   - S/p vancomycin 1500 mg IV qd (4/2); 2nd dose 4/4, 3rd dose 4/5  - 4/2- CXR showing increasing RUL consolidation  - C/w 5d meropenem 500 mg IV qd (started 3/27-3/31); (4/2-4/7). Hold off Meropenem after 5 day course     MSK:  #infiltration  Infiltration of sodium bicarb into left arm, now with arm distension and bullae. Elevated uric acid with nodule suggegstive of podagra however no signs of active gout flare.   -Appreciate orthopedic hand surgery, not compartment syndrome   - CTM    Ethics: Full Code   Pending: G/J-tube placement for feeding by surgery team.  Palliative Team: Consulted

## 2025-04-09 LAB
ALBUMIN SERPL ELPH-MCNC: 2.3 G/DL — LOW (ref 3.3–5)
ALP SERPL-CCNC: 214 U/L — HIGH (ref 40–120)
ALT FLD-CCNC: 22 U/L — SIGNIFICANT CHANGE UP (ref 4–41)
ANION GAP SERPL CALC-SCNC: 18 MMOL/L — HIGH (ref 7–14)
ANISOCYTOSIS BLD QL: SLIGHT — SIGNIFICANT CHANGE UP
APTT BLD: 28.9 SEC — SIGNIFICANT CHANGE UP (ref 24.5–35.6)
AST SERPL-CCNC: 103 U/L — HIGH (ref 4–40)
BASOPHILS # BLD AUTO: 0.17 K/UL — SIGNIFICANT CHANGE UP (ref 0–0.2)
BASOPHILS NFR BLD AUTO: 0.9 % — SIGNIFICANT CHANGE UP (ref 0–2)
BILIRUB SERPL-MCNC: 13.7 MG/DL — HIGH (ref 0.2–1.2)
BLOOD GAS VENOUS COMPREHENSIVE RESULT: SIGNIFICANT CHANGE UP
BUN SERPL-MCNC: 68 MG/DL — HIGH (ref 7–23)
CALCIUM SERPL-MCNC: 8.9 MG/DL — SIGNIFICANT CHANGE UP (ref 8.4–10.5)
CHLORIDE SERPL-SCNC: 97 MMOL/L — LOW (ref 98–107)
CO2 SERPL-SCNC: 21 MMOL/L — LOW (ref 22–31)
CREAT SERPL-MCNC: 2.76 MG/DL — HIGH (ref 0.5–1.3)
DACRYOCYTES BLD QL SMEAR: SLIGHT — SIGNIFICANT CHANGE UP
EGFR: 28 ML/MIN/1.73M2 — LOW
EGFR: 28 ML/MIN/1.73M2 — LOW
EOSINOPHIL # BLD AUTO: 0.17 K/UL — SIGNIFICANT CHANGE UP (ref 0–0.5)
EOSINOPHIL NFR BLD AUTO: 0.9 % — SIGNIFICANT CHANGE UP (ref 0–6)
GIANT PLATELETS BLD QL SMEAR: PRESENT — SIGNIFICANT CHANGE UP
GLUCOSE BLDC GLUCOMTR-MCNC: 118 MG/DL — HIGH (ref 70–99)
GLUCOSE SERPL-MCNC: 112 MG/DL — HIGH (ref 70–99)
HCT VFR BLD CALC: 14.1 % — CRITICAL LOW (ref 39–50)
HGB BLD-MCNC: 5.4 G/DL — CRITICAL LOW (ref 13–17)
HYPOCHROMIA BLD QL: SLIGHT — SIGNIFICANT CHANGE UP
IANC: 13.09 K/UL — HIGH (ref 1.8–7.4)
INR BLD: 1.1 RATIO — SIGNIFICANT CHANGE UP (ref 0.85–1.16)
LYMPHOCYTES # BLD AUTO: 1.74 K/UL — SIGNIFICANT CHANGE UP (ref 1–3.3)
LYMPHOCYTES # BLD AUTO: 9.2 % — LOW (ref 13–44)
MACROCYTES BLD QL: SLIGHT — SIGNIFICANT CHANGE UP
MAGNESIUM SERPL-MCNC: 1.9 MG/DL — SIGNIFICANT CHANGE UP (ref 1.6–2.6)
MANUAL SMEAR VERIFICATION: SIGNIFICANT CHANGE UP
MCHC RBC-ENTMCNC: 30.2 PG — SIGNIFICANT CHANGE UP (ref 27–34)
MCHC RBC-ENTMCNC: 38.3 G/DL — HIGH (ref 32–36)
MCV RBC AUTO: 78.8 FL — LOW (ref 80–100)
MICROCYTES BLD QL: SLIGHT — SIGNIFICANT CHANGE UP
MONOCYTES # BLD AUTO: 1.21 K/UL — HIGH (ref 0–0.9)
MONOCYTES NFR BLD AUTO: 6.4 % — SIGNIFICANT CHANGE UP (ref 2–14)
NEUTROPHILS # BLD AUTO: 15.06 K/UL — HIGH (ref 1.8–7.4)
NEUTROPHILS NFR BLD AUTO: 79.8 % — HIGH (ref 43–77)
OVALOCYTES BLD QL SMEAR: SLIGHT — SIGNIFICANT CHANGE UP
PHOSPHATE SERPL-MCNC: 5.1 MG/DL — HIGH (ref 2.5–4.5)
PLAT MORPH BLD: NORMAL — SIGNIFICANT CHANGE UP
PLATELET # BLD AUTO: 266 K/UL — SIGNIFICANT CHANGE UP (ref 150–400)
PLATELET COUNT - ESTIMATE: NORMAL — SIGNIFICANT CHANGE UP
POIKILOCYTOSIS BLD QL AUTO: SLIGHT — SIGNIFICANT CHANGE UP
POLYCHROMASIA BLD QL SMEAR: SIGNIFICANT CHANGE UP
POTASSIUM SERPL-MCNC: 3 MMOL/L — LOW (ref 3.5–5.3)
POTASSIUM SERPL-SCNC: 3 MMOL/L — LOW (ref 3.5–5.3)
PROT SERPL-MCNC: 6.4 G/DL — SIGNIFICANT CHANGE UP (ref 6–8.3)
PROTHROM AB SERPL-ACNC: 12.8 SEC — SIGNIFICANT CHANGE UP (ref 9.9–13.4)
RBC # BLD: 1.79 M/UL — LOW (ref 4.2–5.8)
RBC # FLD: 18.5 % — HIGH (ref 10.3–14.5)
RBC BLD AUTO: ABNORMAL
SODIUM SERPL-SCNC: 136 MMOL/L — SIGNIFICANT CHANGE UP (ref 135–145)
VARIANT LYMPHS # BLD: 2.8 % — SIGNIFICANT CHANGE UP (ref 0–6)
VARIANT LYMPHS NFR BLD MANUAL: 2.8 % — SIGNIFICANT CHANGE UP (ref 0–6)
WBC # BLD: 18.87 K/UL — HIGH (ref 3.8–10.5)
WBC # FLD AUTO: 18.87 K/UL — HIGH (ref 3.8–10.5)

## 2025-04-09 PROCEDURE — 99291 CRITICAL CARE FIRST HOUR: CPT | Mod: GC,25

## 2025-04-09 PROCEDURE — 76604 US EXAM CHEST: CPT | Mod: 26,GC

## 2025-04-09 PROCEDURE — 93308 TTE F-UP OR LMTD: CPT | Mod: 26,GC

## 2025-04-09 PROCEDURE — 99233 SBSQ HOSP IP/OBS HIGH 50: CPT | Mod: GC

## 2025-04-09 RX ORDER — ACETAMINOPHEN 500 MG/5ML
1000 LIQUID (ML) ORAL ONCE
Refills: 0 | Status: COMPLETED | OUTPATIENT
Start: 2025-04-09 | End: 2025-04-09

## 2025-04-09 RX ADMIN — Medication 100 MILLIEQUIVALENT(S): at 03:07

## 2025-04-09 RX ADMIN — DIAZEPAM 30 MILLIGRAM(S): 2 TABLET ORAL at 13:08

## 2025-04-09 RX ADMIN — FOLIC ACID 1 MILLIGRAM(S): 1 TABLET ORAL at 11:18

## 2025-04-09 RX ADMIN — Medication 40 MILLIGRAM(S): at 18:24

## 2025-04-09 RX ADMIN — Medication 1000 MILLIGRAM(S): at 19:27

## 2025-04-09 RX ADMIN — Medication 40 MILLIEQUIVALENT(S): at 09:21

## 2025-04-09 RX ADMIN — DOXAZOSIN MESYLATE 1 MILLIGRAM(S): 8 TABLET ORAL at 21:17

## 2025-04-09 RX ADMIN — IPRATROPIUM BROMIDE AND ALBUTEROL SULFATE 3 MILLILITER(S): .5; 2.5 SOLUTION RESPIRATORY (INHALATION) at 02:23

## 2025-04-09 RX ADMIN — Medication 650 MILLIGRAM(S): at 13:08

## 2025-04-09 RX ADMIN — LEVETIRACETAM 500 MILLIGRAM(S): 10 INJECTION, SOLUTION INTRAVENOUS at 18:26

## 2025-04-09 RX ADMIN — Medication 1 APPLICATION(S): at 18:25

## 2025-04-09 RX ADMIN — Medication 40 MILLIGRAM(S): at 05:38

## 2025-04-09 RX ADMIN — DIAZEPAM 30 MILLIGRAM(S): 2 TABLET ORAL at 05:40

## 2025-04-09 RX ADMIN — Medication 650 MILLIGRAM(S): at 21:13

## 2025-04-09 RX ADMIN — PROPOFOL 26.6 MICROGRAM(S)/KG/MIN: 10 INJECTION, EMULSION INTRAVENOUS at 11:19

## 2025-04-09 RX ADMIN — PROPOFOL 26.6 MICROGRAM(S)/KG/MIN: 10 INJECTION, EMULSION INTRAVENOUS at 08:05

## 2025-04-09 RX ADMIN — MIDODRINE HYDROCHLORIDE 10 MILLIGRAM(S): 5 TABLET ORAL at 05:38

## 2025-04-09 RX ADMIN — IPRATROPIUM BROMIDE AND ALBUTEROL SULFATE 3 MILLILITER(S): .5; 2.5 SOLUTION RESPIRATORY (INHALATION) at 15:22

## 2025-04-09 RX ADMIN — Medication 400 MILLIGRAM(S): at 18:57

## 2025-04-09 RX ADMIN — PROPOFOL 26.6 MICROGRAM(S)/KG/MIN: 10 INJECTION, EMULSION INTRAVENOUS at 21:12

## 2025-04-09 RX ADMIN — DIAZEPAM 30 MILLIGRAM(S): 2 TABLET ORAL at 21:12

## 2025-04-09 RX ADMIN — Medication 15 MILLILITER(S): at 18:25

## 2025-04-09 RX ADMIN — Medication 15 MILLILITER(S): at 05:38

## 2025-04-09 RX ADMIN — Medication 1 APPLICATION(S): at 11:18

## 2025-04-09 RX ADMIN — Medication 1 APPLICATION(S): at 05:38

## 2025-04-09 RX ADMIN — Medication 100 MILLIEQUIVALENT(S): at 04:37

## 2025-04-09 RX ADMIN — IPRATROPIUM BROMIDE AND ALBUTEROL SULFATE 3 MILLILITER(S): .5; 2.5 SOLUTION RESPIRATORY (INHALATION) at 20:25

## 2025-04-09 RX ADMIN — MIDODRINE HYDROCHLORIDE 10 MILLIGRAM(S): 5 TABLET ORAL at 13:08

## 2025-04-09 RX ADMIN — Medication 650 MILLIGRAM(S): at 05:38

## 2025-04-09 RX ADMIN — IPRATROPIUM BROMIDE AND ALBUTEROL SULFATE 3 MILLILITER(S): .5; 2.5 SOLUTION RESPIRATORY (INHALATION) at 08:30

## 2025-04-09 RX ADMIN — LEVETIRACETAM 500 MILLIGRAM(S): 10 INJECTION, SOLUTION INTRAVENOUS at 05:38

## 2025-04-09 RX ADMIN — Medication 100 MILLIEQUIVALENT(S): at 06:00

## 2025-04-09 NOTE — CHART NOTE - NSCHARTNOTEFT_GEN_A_CORE
Palliative reconsulted per patient's brother, Bala's wishes. Palliative team spoke with Bala who shared that his frustrations with "feeling like different providers are telling him things about his brother's prognosis" and there's a lot of "miscommunication" as the teams change every week. He feels frustrated sharing that "he doesn't want to feel pressured to make decisions for his brother as it's only been 3 weeks since his brother's cardiac event". We attempted to explain that medical decisions were made that were in line with his goals for his brother including tracheostomy and pursuing HD via catheter. To minimize any miscommunication between team members, offered to help coordinate a family meeting with his desired family members and the medical teams involved in his brother's care to clarify any of the family's questions. Provided with palliative care office number after he communicates with his Godmothers. Will await timing of family meeting from Bala.     Thank you for allowing us to participate in your patient's care. We will continue to follow with you. Please page [phone] for any q's or c's. The Geriatric and Palliative Medicine service has coverage 24 hours a day/ 7 days a week to provide medical recommendations regarding symptom management needs via telephone.    Eve Tavares D.O.   Palliative Medicine Palliative reconsulted per patient's brother, Bala's wishes. Palliative team spoke with Bala who shared his frustrations with "feeling like different providers are telling him inconsistent things about his brother's prognosis" and there's a lot of "miscommunication" as the teams change every week. He feels frustrated sharing that "he doesn't want to feel pressured to make decisions for his brother as it's only been 3 weeks since his brother's cardiac event". We attempted to explain that medical decisions were made that were in line with his goals for his brother including tracheostomy and pursuing HD via catheter. To minimize any miscommunication between team members, offered to help coordinate a family meeting with his desired family members and the medical teams involved in his brother's care to clarify any of the family's questions. Provided with palliative care office number after he communicates with his Godmothers. Will await timing of family meeting from Bala.     Thank you for allowing us to participate in your patient's care. We will continue to follow with you. Please page [phone] for any q's or c's. The Geriatric and Palliative Medicine service has coverage 24 hours a day/ 7 days a week to provide medical recommendations regarding symptom management needs via telephone.    Eve Tavares D.O.   Palliative Medicine

## 2025-04-09 NOTE — PROGRESS NOTE ADULT - SUBJECTIVE AND OBJECTIVE BOX
[facility] DIVISION OF KIDNEY DISEASES AND HYPERTENSION   FOLLOW UP NOTE    --------------------------------------------------------------------------------  Chief Complaint:    24 hour events/subjective: Pt. was seen and examined today in MICU. Remains sedated.      PAST HISTORY  --------------------------------------------------------------------------------  No significant changes to PMH, PSH, FHx, SHx, unless otherwise noted    ALLERGIES & MEDICATIONS  --------------------------------------------------------------------------------  Allergies    penicillin (Pruritus)  hydroxyurea (Other)  piperacillin-tazobactam (Urticaria)  ceftriaxone (Anaphylaxis)    Intolerances      Standing Inpatient Medications  albuterol/ipratropium for Nebulization 3 milliLiter(s) Nebulizer every 6 hours  allopurinol 50 milliGRAM(s) Oral <User Schedule>  chlorhexidine 0.12% Liquid 15 milliLiter(s) Oral Mucosa every 12 hours  chlorhexidine 2% Cloths 1 Application(s) Topical daily  diazepam    Tablet 30 milliGRAM(s) Oral three times a day  doxazosin 1 milliGRAM(s) Oral at bedtime  folic acid 1 milliGRAM(s) Oral daily  influenza   Vaccine 0.5 milliLiter(s) IntraMuscular once  levETIRAcetam  Solution 500 milliGRAM(s) Oral two times a day  midodrine 10 milliGRAM(s) Oral every 8 hours  pantoprazole  Injectable 40 milliGRAM(s) IV Push every 12 hours  petrolatum Ophthalmic Ointment 1 Application(s) Both EYES two times a day  potassium chloride   Powder 40 milliEquivalent(s) Oral once  propofol Infusion 50 MICROgram(s)/kG/Min IV Continuous <Continuous>  sodium bicarbonate 650 milliGRAM(s) Oral three times a day    PRN Inpatient Medications      REVIEW OF SYSTEMS  --------------------------------------------------------------------------------  unable to obtain due to clinic status      VITALS/PHYSICAL EXAM  --------------------------------------------------------------------------------  T(C): 37.1 (04-09-25 @ 07:00), Max: 38.2 (04-08-25 @ 20:00)  HR: 106 (04-09-25 @ 08:00) (99 - 123)  BP: 126/75 (04-09-25 @ 08:00) (88/48 - 126/75)  RR: 18 (04-09-25 @ 08:00) (16 - 32)  SpO2: 96% (04-09-25 @ 08:00) (93% - 100%)  Wt(kg): --        04-08-25 @ 07:01  -  04-09-25 @ 07:00  --------------------------------------------------------  IN: 1219.2 mL / OUT: 2835 mL / NET: -1615.8 mL    04-09-25 @ 07:01  -  04-09-25 @ 09:06  --------------------------------------------------------  IN: 26.6 mL / OUT: 60 mL / NET: -33.4 mL        Physical Exam:  	Gen: Ill appearing   	HEENT: Trached  	Pulm: CTA B/L  	CV: S1S2+  	Abd: Distended    	Ext: +edema  	Neuro: Sedated  	Skin: Warm and dry  	Dialysis access: Right femoral NTDC        LABS/STUDIES  --------------------------------------------------------------------------------              5.4    18.87 >-----------<  266      [04-09-25 @ 00:55]              14.1     136  |  97  |  68  ----------------------------<  112      [04-09-25 @ 00:55]  3.0   |  21  |  2.76        Ca     8.9     [04-09-25 @ 00:55]      Mg     1.90     [04-09-25 @ 00:55]      Phos  5.1     [04-09-25 @ 00:55]    TPro  6.4  /  Alb  2.3  /  TBili  13.7  /  DBili  x   /  AST  103  /  ALT  22  /  AlkPhos  214  [04-09-25 @ 00:55]    PT/INR: PT 12.8 , INR 1.10       [04-09-25 @ 00:55]  PTT: 28.9       [04-09-25 @ 00:55]      Creatinine Trend:  SCr 2.76 [04-09 @ 00:55]  SCr 4.61 [04-08 @ 00:33]  SCr 4.22 [04-07 @ 00:24]  SCr 3.88 [04-06 @ 00:15]  SCr 3.54 [04-05 @ 01:02]    Urinalysis - [04-09-25 @ 00:55]      Color  / Appearance  / SG  / pH       Gluc 112 / Ketone   / Bili  / Urobili        Blood  / Protein  / Leuk Est  / Nitrite       RBC  / WBC  / Hyaline  / Gran  / Sq Epi  / Non Sq Epi  / Bacteria       HBsAb 50.9      [03-29-25 @ 07:15]  HBsAg Nonreact      [03-17-25 @ 15:26]  HBcAb Nonreact      [03-29-25 @ 07:15]  HCV 0.10, Nonreact      [03-17-25 @ 15:26]  HIV Nonreact      [03-17-25 @ 15:26]      Tacrolimus  Cyclosporine  Sirolimus  Mycophenolate  BK PCR  CMV PCRCMVPCR Log: NotDetec [id number]/mL (12-27 @ 05:38)    Parvo PCR  EBV PCR

## 2025-04-09 NOTE — CHART NOTE - NSCHARTNOTEFT_GEN_A_CORE
______________ CRITICAL CARE NUTRITION FOLLOW-UP ________________        --Medical Course--  44y  M w/ SSD, UE DVT, gout, HTN, with L renal mass admitted with acute anemia/ sickle cell crisis and evaluation of his left renal mass s/p left ureteroscopy and biopsy (3/19/25) with 24 hour post operative course complicated by rapid responses for hypoglycemia c/b cardiac arrest x 4 minutes CPR/1 epi with ROSC, transferred to MICU for further care, found to have anoxic brain injury.  Propofol for myoclonic jerking.   ~531 to be provided by Propofol over 24hrs @ current rate.  Plan is to wean Propofol.  Renal bx showing non-invasive urothelial carcinoma.  Also noted with early cirrhosis & w/ oliguric MATA s/p HD, as well as worsening R sided PNA.  S/p bronchoscopy (4/2)  Pt. also noted with worsening renal Fx & will need chronic HD TIW.           --Nutrition Course--  Weight status with overall increase, which may perhaps be in part due to fluid accumulation considering edema, as well as fluid shifts between dialysis sessions.        Inadequate energy intake due to NPO x 3d.  Pt. had been receiving Nepro @ 40mL/hr x 18hrs + Liquid Protein Supplement (LPS).  This regimen appropriately meet estimated energy requirements.  Currently, no feeding tube in place as Pt. was pending possible surgical placement of enteral tube.  However, @ this time, per discussion with MICU provider, Pt. not deemed surgical candidate for enteral feeding tube placement.  Hence, ongoing C discussion with family re: [long term] nutritional plan.            __________Diet Order_________  Diet, NPO:   Except Medications (04-06-25 @ 18:50) [Active]            Weight:      Height:  69" / 175.26cm               Ideal Body Weight: 160lbs / 72.7kg  105.1kg (4/2)  109.9kg (3/28)  87.3kg (3/24)  111.4kg (3/22)  88.8kg (3/15 on admit)      _____Previousy Estimated Energy Needs (based on IBW)_____  25-30 KCals/kg = 9786-8772 KCals/d  1.6-1.8g protein/kg= 116-131g protein/d        Edema:  3+ scrotum    Skin: R arm and R ear skin wounds        ________________________Pertinent Medications____________   MEDICATIONS  (STANDING):  albuterol/ipratropium for Nebulization 3 milliLiter(s) Nebulizer every 6 hours  allopurinol 50 milliGRAM(s) Oral <User Schedule>  chlorhexidine 0.12% Liquid 15 milliLiter(s) Oral Mucosa every 12 hours  chlorhexidine 2% Cloths 1 Application(s) Topical daily  diazepam    Tablet 30 milliGRAM(s) Oral three times a day  doxazosin 1 milliGRAM(s) Oral at bedtime  folic acid 1 milliGRAM(s) Oral daily  influenza   Vaccine 0.5 milliLiter(s) IntraMuscular once  levETIRAcetam  Solution 500 milliGRAM(s) Oral two times a day  midodrine 10 milliGRAM(s) Oral every 8 hours  pantoprazole  Injectable 40 milliGRAM(s) IV Push every 12 hours  petrolatum Ophthalmic Ointment 1 Application(s) Both EYES two times a day  propofol Infusion 50 MICROgram(s)/kG/Min (26.6 mL/Hr) IV Continuous <Continuous>  sodium bicarbonate 650 milliGRAM(s) Oral three times a day    MEDICATIONS  (PRN):          _________________________Pertinent Labs____________________     04-09    136  |  97[L]  |  68[H]  ----------------------------<  112[H]  3.0[L]   |  21[L]  |  2.76[H]    Ca    8.9      09 Apr 2025 00:55  Phos  5.1     04-09  Mg     1.90     04-09      CAPILLARY BLOOD GLUCOSE  POCT Blood Glucose.: 118 mg/dL (04-09-25 @ 06:13)  POCT Blood Glucose.: 127 mg/dL (04-08-25 @ 23:21)  POCT Blood Glucose.: 105 mg/dL (04-08-25 @ 11:17)      Triglycerides, Serum: 364 mg/dL (04-04-25 @ 00:35)  Triglycerides, Serum: 348 mg/dL (03-24-25 @ 18:00)          PLAN/RECOMMENDATIONS:    1) NPO pending (nutritional) GOC discussion.    2) Obtain daily weights  3) Monitor GI status, electrolytes, glucose    RDN remains available and will f/u PRN.          Courtney Mendez RDN, CDN       pager [phone] or [place] Teams

## 2025-04-09 NOTE — PROGRESS NOTE ADULT - ASSESSMENT
Assessment and Plan: 	  Patient is 44y Male with PMHx of sickle cell disease admitted for anemia concerning for sickle cell crisis s/p ureteroscopy w/ L kidney biopsy 3/19, post-operative course c/b acute hemorrhagic shock, PEA arrest with ROSC; currently with acute renal failure on HD and global anoxic brain injury and urothelial cancer; s/p tracheostomy pending possible G/J tube placement by surgery given no safe endoscopic window for PEG placement. Pending C discussions for further interventions.      NEURO:  #Global anoxic brain injury   AAOx0. On sedation with no further myoclonus. Guarded prognosis.   - Wean propofol as tolerated & increase valium to 30 mg TID.  - D/marjan fenatnyl   - S/p versed gtt, weaned off 3/23  - Repeat CT B 3/24 showing diffuse sulcal effacement with increased intracrnail pressure, and few patchy foci of cortical blurring   - 3/25 MRIB with diffuse global abnormal supratentorial cortical restricted diffusion consistent with global hypoxic injury   -04/08/25: Patient still AAOx O with no response to voice or noxious stimuli    #Seizures  No further myoclonus of upper body after restarting propofol.   - Witness myoclonic jerking concerning for seizures iso anoxic brain injury  - s/p 2.5g keppra load & midazolam 2mg IV q8 PRN   - vEEG report: "Abundant myoclonic seizures in the setting of a severe diffuse/multifocal cerebral dysfunction which can be seen in the setting of sedative effect or due to an anoxic injury, with improvement after 20:50 suggesting a lowering of sedation"; will f/u with neuro recommendations. Repeat vEEG 3/24 showing severe cerebral dysfunction   -C/w levetiracetam 500 mg po BID & Diazepam 30 mg po TID (increased). Attempt to wean off propofol.  -Reach out to neurology about long-term myoclonus suppressive medication    CV:  #Shock - hemorrhagic shock s/p kidney biopsy s/p 5u pRBC, 3 plasma, 3 plt  last dose of lovenox AC on 3/18 at 5PM   Hgb 8 -> ~3.  Current Hg = 5-6  - Not on levophed, MAP goal>65  - Monitor cbc q24  - Midodrine 10mg TID & trend CBC daily for Hg goal   - Hg goal >6 per heme/onc    #Hx of VTE (R IJ thrombus, L brachial DVT)  - CTM, hold AC given hemorrhage  - Bullae present on arm with DVT  - Appreciate orthopedics hand surgery consult, not compartment syndrome       PULM:  #Acute hypoxic respiratory failure  #Pna- 2/2 ventilation requirement  #Tracheostomy   In the setting of PEA arrest. Triggering ventilator on CPAP. Still requiring breathing support.   CXR with R lung field and left mid and lower lung field hazy opacities.  - Wean vent as able  - continue with duonebs Q6H & s/p HTS  - 3/28 CXR showing increasing RUL consolidation   -4/4 bedside tracheostomy   -S/p Abx completion course as below and currently on meropenem 500 mg Q24H (04/02 - 04/07)    RENAL:  #Acute renal failure.   #Renal mass s/p biopsy -   #Acidosis- RESOLVED   Oliguric & receiving Bumex IVP PRN per nephro recs  Acute renal failure s/p cardiac arrest most likely 2/2 ATN requiring intermittent HD with nephro following  - TOV 3/28, failed; replaced daniel on 4/1; Attempt TOV (04/08/25)  - C/w doxazoin 1 mg po qhs to optimize subsequent TOV trial   - Trend Cr, uop, lytes  - S/p bicarb drip completion  -C/w sodium bicarbonate 650 mg po TID   - 3/31 removed L IJV shiley; 4/2 placed R fem shiley    - Per pathology result of renal biopsy, noninvasive urothelial malignancy   - Per nephro, HD session #1 3/24, HD sessions #2 3/25, HD session #3 3/27; planning HD #4 3/29; 4/3 HD #5; HD sessions per nephro. Removed R. femoral line (04/08) and follow up blood cxs 48 hrs after removal. Patient will need HD access.       GI:  #Shock liver  Unchanged  LFTs. Jaundice with hyperbilirubinemia (direct).   - Trend liver enzymes  - RUQ US showing enlarged periportal and periaortic enlarged lymph nodes and enlarged left lateral liver lobe and CBD 9 mm.     #Occult blood in feces  + Occult blood with dropping Hgb daily below goal of 6 ( Hx of SS). Patient with melena during hospital stay requiring pRBC to meet goal and with inappropriate Hgb response.  S/p 1 uPRBC 4/1, 1 uPRBC 4/2, 1 uPRBC 4/5, uPRBC 4/7, uPRBC 4/8, & uPRBC 4/9  Hold off on GI consult  Maintain active T & S  C/w PPI 40 mg IV BID for now.    #Diet:  - NPO except meds   - GI unable to place PEG due to hepatomegaly.   - Per IR consult: Given no safe window for endoscopic placement, recommend surgical consultation.   - Surgery consulted & following. Pending further GOCs discussion.      HEMATOLOGIC/ONCOLGOGIC   #Sickle cell crisis--Resolved  #Acute blood loss anemia- 2/2 sickle cells and uremia vs critical illness vs GIB  Downtrending direct hyperbilirubinemia,  gradually & Increasing reticulocyte count.   - Heme following & recs appreciated  -D/c deferasirox due to current renal failure   -S/p 1 uPRBC 4/1, 1 uPRBC 4/2, 1 uPRBC 4/5, uPRBC 4/7, & uPRBC 4/8 since beginning of April  - Occult blood positive. C/w PPI 40 mg IV BID for now.  - Transfuse as needed Hb >6 goal per Heme.    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 CXR showing increased RUL opacification.   - D/c  aztreonam 2000 mg IV qd ( started 3/22- 3/27)   - S/p vancomycin 1500 mg IV qd (4/2); 2nd dose 4/4, 3rd dose 4/5  - 4/2- CXR showing increasing RUL consolidation  - Completed 5d meropenem 500 mg IV qd (started 3/27-3/31); (4/2-4/7). Hold off Meropenem after 5 day course     MSK:  #infiltration  Infiltration of sodium bicarb into left arm, now with arm distension and bullae. Elevated uric acid with nodule suggegstive of podagra however no signs of active gout flare.   -Appreciate orthopedic hand surgery, not compartment syndrome   - CTM    Ethics: Full Code   Pending: G/J-tube placement for feeding by surgery team.  Palliative Team: Consulted   Assessment and Plan: 	  Patient is 44y Male with PMHx of sickle cell disease admitted for anemia concerning for sickle cell crisis s/p ureteroscopy w/ L kidney biopsy 3/19, post-operative course c/b acute hemorrhagic shock, PEA arrest with ROSC; currently with acute renal failure on HD and global anoxic brain injury and urothelial cancer; s/p tracheostomy pending possible G/J tube placement by surgery given no safe endoscopic window for PEG placement. Pending C discussions for further interventions.      NEURO:  #Global anoxic brain injury   AAOx0. On sedation with no further myoclonus. Guarded prognosis.   - Wean propofol as tolerated & c/w valium 30 mg TID.  - D/marjan fenatnyl   - S/p versed gtt, weaned off 3/23  - Repeat CT B 3/24 showing diffuse sulcal effacement with increased intracrnail pressure, and few patchy foci of cortical blurring   - 3/25 MRIB with diffuse global abnormal supratentorial cortical restricted diffusion consistent with global hypoxic injury   -04/08/25: Patient still AAOx O with no response to voice or noxious stimuli    #Seizures  No further myoclonus of upper body after restarting propofol.   - Witness myoclonic jerking concerning for seizures iso anoxic brain injury  - s/p 2.5g keppra load & midazolam 2mg IV q8 PRN   - vEEG report: "Abundant myoclonic seizures in the setting of a severe diffuse/multifocal cerebral dysfunction which can be seen in the setting of sedative effect or due to an anoxic injury, with improvement after 20:50 suggesting a lowering of sedation"; will f/u with neuro recommendations. Repeat vEEG 3/24 showing severe cerebral dysfunction   -C/w levetiracetam 500 mg po BID & Diazepam 30 mg po TID. Attempt to wean off propofol.  -Reach out to neurology about long-term myoclonus suppressive medication    CV:  #Shock - hemorrhagic shock s/p kidney biopsy s/p 5u pRBC, 3 plasma, 3 plt  last dose of lovenox AC on 3/18 at 5PM   Hgb 8 -> ~3.  Current Hg = 5-6  - Not on levophed, MAP goal>65  - Monitor cbc q24  - Midodrine 10mg TID & trend CBC daily for Hg goal   - Hg goal >6 per heme/onc    #Hx of VTE (R IJ thrombus, L brachial DVT)  - CTM, hold AC given hemorrhage  - Bullae present on arm with DVT  - Appreciate orthopedics hand surgery consult, not compartment syndrome       PULM:  #Acute hypoxic respiratory failure  #Pna- 2/2 ventilation requirement  #Tracheostomy   In the setting of PEA arrest. Triggering ventilator on CPAP. Still requiring breathing support.   CXR with R lung field and left mid and lower lung field hazy opacities.  - Wean vent as able  - continue with duonebs Q6H & s/p HTS  - 3/28 CXR showing increasing RUL consolidation   -4/4 bedside tracheostomy   -S/p Abx completion course as below and currently on meropenem 500 mg Q24H (04/02 - 04/07)    RENAL:  #Acute renal failure.   #Renal mass s/p biopsy -   #Acidosis- RESOLVED   Oliguric & receiving Bumex IVP PRN per nephro recs  Acute renal failure s/p cardiac arrest most likely 2/2 ATN requiring intermittent HD with nephro following  - TOV 3/28, failed; replaced daniel on 4/1; Attempt TOV (04/08/25)  - C/w doxazoin 1 mg po qhs to optimize subsequent TOV trial   - Trend Cr, uop, lytes  - S/p bicarb drip completion  -C/w sodium bicarbonate 650 mg po TID   - 3/31 removed L IJV shiley; 4/2 placed R fem shiley    - Per pathology result of renal biopsy, noninvasive urothelial malignancy   - Per nephro, HD session #1 3/24, HD sessions #2 3/25, HD session #3 3/27; planning HD #4 3/29; 4/3 HD #5; HD sessions per nephro. Removed R. femoral line (04/08) and follow up blood cxs 48 hrs after removal. Patient will need HD access.       GI:  #Shock liver  Unchanged  LFTs. Jaundice with hyperbilirubinemia (direct).   - Trend liver enzymes  - RUQ US showing enlarged periportal and periaortic enlarged lymph nodes and enlarged left lateral liver lobe and CBD 9 mm.     #Occult blood in feces  + Occult blood with dropping Hgb daily below goal of 6 ( Hx of SS). Patient with melena during hospital stay requiring pRBC to meet goal and with inappropriate Hgb response.  S/p 1 uPRBC 4/1, 1 uPRBC 4/2, 1 uPRBC 4/5, uPRBC 4/7, uPRBC 4/8, & uPRBC 4/9  Hold off on GI consult  Maintain active T & S  C/w PPI 40 mg IV BID for now.    #Diet:  - NPO except meds   - GI unable to place PEG due to hepatomegaly.   - Per IR consult: Given no safe window for endoscopic placement, recommend surgical consultation.   - Surgery consulted & following. Pending further GOCs discussion.      HEMATOLOGIC/ONCOLGOGIC   #Sickle cell crisis--Resolved  #Acute blood loss anemia- 2/2 sickle cells and uremia vs critical illness vs GIB  Downtrending direct hyperbilirubinemia,  gradually & Increasing reticulocyte count.   - Heme following & recs appreciated  -D/c deferasirox due to current renal failure   -S/p 1 uPRBC 4/1, 1 uPRBC 4/2, 1 uPRBC 4/5, uPRBC 4/7, & uPRBC 4/8 since beginning of April  - Occult blood positive. C/w PPI 40 mg IV BID for now.  - Transfuse as needed Hb >6 goal per Heme.    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 CXR showing increased RUL opacification.   - D/c  aztreonam 2000 mg IV qd ( started 3/22- 3/27)   - S/p vancomycin 1500 mg IV qd (4/2); 2nd dose 4/4, 3rd dose 4/5  - 4/2- CXR showing increasing RUL consolidation  - Completed 5d meropenem 500 mg IV qd (started 3/27-3/31); (4/2-4/7). Hold off Meropenem after 5 day course     MSK:  #infiltration  Infiltration of sodium bicarb into left arm, now with arm distension and bullae. Elevated uric acid with nodule suggegstive of podagra however no signs of active gout flare.   -Appreciate orthopedic hand surgery, not compartment syndrome   - CTM    Ethics: Full Code   Pending: G/J-tube placement for feeding by surgery team.  Palliative Team: Consulted   Assessment and Plan: 	  Patient is 44y Male with PMHx of sickle cell disease admitted for anemia concerning for sickle cell crisis s/p ureteroscopy w/ L kidney biopsy 3/19, post-operative course c/b acute hemorrhagic shock, PEA arrest with ROSC; currently with acute renal failure on HD and global anoxic brain injury and urothelial cancer; s/p tracheostomy pending possible G/J tube placement by surgery given no safe endoscopic window for PEG placement. Pending C discussions for further interventions.      NEURO:  #Global anoxic brain injury   AAOx0. On sedation with no further myoclonus. Guarded prognosis.   - Wean propofol as tolerated & c/w valium 30 mg TID.  - D/marjan fenatnyl   - S/p versed gtt, weaned off 3/23  - Repeat CT B 3/24 showing diffuse sulcal effacement with increased intracrnail pressure, and few patchy foci of cortical blurring   - 3/25 MRIB with diffuse global abnormal supratentorial cortical restricted diffusion consistent with global hypoxic injury   -04/08/25: Patient still AAOx O with no response to voice or noxious stimuli    #Seizures  No further myoclonus of upper body after restarting propofol.   - Witness myoclonic jerking concerning for seizures iso anoxic brain injury  - s/p 2.5g keppra load & midazolam 2mg IV q8 PRN   - vEEG report: "Abundant myoclonic seizures in the setting of a severe diffuse/multifocal cerebral dysfunction which can be seen in the setting of sedative effect or due to an anoxic injury, with improvement after 20:50 suggesting a lowering of sedation"; will f/u with neuro recommendations. Repeat vEEG 3/24 showing severe cerebral dysfunction   -C/w levetiracetam 500 mg po BID & Diazepam 30 mg po TID. Attempt to wean off propofol.      CV:  #Shock - hemorrhagic shock s/p kidney biopsy s/p 5u pRBC, 3 plasma, 3 plt  last dose of lovenox AC on 3/18 at 5PM   Hgb 8 -> ~3.  Current Hg = 5-6  - Not on levophed, MAP goal>65  - Monitor cbc q24  - Midodrine 10mg TID & trend CBC daily for Hg goal   - Hg goal >6 per heme/onc    #Hx of VTE (R IJ thrombus, L brachial DVT)  - CTM, hold AC given hemorrhage  - Bullae present on arm with DVT  - Appreciate orthopedics hand surgery consult, not compartment syndrome       PULM:  #Acute hypoxic respiratory failure  #Pna- 2/2 ventilation requirement  #Tracheostomy   In the setting of PEA arrest. Triggering ventilator on CPAP. Still requiring breathing support.   CXR with R lung field and left mid and lower lung field hazy opacities.  - Wean vent as able  - continue with duonebs Q6H & s/p HTS  - 3/28 CXR showing increasing RUL consolidation   -4/4 bedside tracheostomy   -S/p Abx completion course as below and currently on meropenem 500 mg Q24H (04/02 - 04/07)    RENAL:  #Acute renal failure.   #Renal mass s/p biopsy -   #Acidosis- RESOLVED   Oliguric & receiving Bumex IVP PRN per nephro recs  Acute renal failure s/p cardiac arrest most likely 2/2 ATN requiring intermittent HD with nephro following  - TOV 3/28, failed; replaced daniel on 4/1; Attempt TOV (04/08/25)  - C/w doxazoin 1 mg po qhs to optimize subsequent TOV trial   - Trend Cr, uop, lytes  - S/p bicarb drip completion  -C/w sodium bicarbonate 650 mg po TID   - 3/31 removed L IJV shiley; 4/2 placed R fem shiley    - Per pathology result of renal biopsy, noninvasive urothelial malignancy   - Per nephro, HD session #1 3/24, HD sessions #2 3/25, HD session #3 3/27; planning HD #4 3/29; 4/3 HD #5; HD sessions per nephro. Removed R. femoral line (04/08) and follow up blood cxs 48 hrs after removal. Patient will need HD access.       GI:  #Shock liver  Unchanged  LFTs. Jaundice with hyperbilirubinemia (direct).   - Trend liver enzymes  - RUQ US showing enlarged periportal and periaortic enlarged lymph nodes and enlarged left lateral liver lobe and CBD 9 mm.     #Occult blood in feces  + Occult blood with dropping Hgb daily below goal of 6 ( Hx of SS). Patient with melena during hospital stay requiring pRBC to meet goal and with inappropriate Hgb response.  S/p 1 uPRBC 4/1, 1 uPRBC 4/2, 1 uPRBC 4/5, uPRBC 4/7, uPRBC 4/8, & uPRBC 4/9  Hold off on GI consult  Maintain active T & S  C/w PPI 40 mg IV BID for now.    #Diet:  - NPO except meds   - GI unable to place PEG due to hepatomegaly.   - Per IR consult: Given no safe window for endoscopic placement, recommend surgical consultation.   - Surgery consulted & following. Pending further GOCs discussion.      HEMATOLOGIC/ONCOLGOGIC   #Sickle cell crisis--Resolved  #Acute blood loss anemia- 2/2 sickle cells and uremia vs critical illness vs GIB  Downtrending direct hyperbilirubinemia,  gradually & Increasing reticulocyte count.   - Heme following & recs appreciated  -D/c deferasirox due to current renal failure   -S/p 1 uPRBC 4/1, 1 uPRBC 4/2, 1 uPRBC 4/5, uPRBC 4/7, & uPRBC 4/8 since beginning of April  - Occult blood positive. C/w PPI 40 mg IV BID for now.  - Transfuse as needed Hb >6 goal per Heme.    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 CXR showing increased RUL opacification.   - D/c  aztreonam 2000 mg IV qd ( started 3/22- 3/27)   - S/p vancomycin 1500 mg IV qd (4/2); 2nd dose 4/4, 3rd dose 4/5  - 4/2- CXR showing increasing RUL consolidation  - Completed 5d meropenem 500 mg IV qd (started 3/27-3/31); (4/2-4/7). Hold off Meropenem after 5 day course     MSK:  #infiltration  Infiltration of sodium bicarb into left arm, now with arm distension and bullae. Elevated uric acid with nodule suggegstive of podagra however no signs of active gout flare.   -Appreciate orthopedic hand surgery, not compartment syndrome   - CTM    Ethics: Full Code   Pending: G/J-tube placement for feeding by surgery team.  Palliative Team: Consulted   Assessment and Plan: 	  Patient is 44y Male with PMHx of sickle cell disease admitted for anemia concerning for sickle cell crisis s/p ureteroscopy w/ L kidney biopsy 3/19, post-operative course c/b acute hemorrhagic shock, PEA arrest with ROSC; currently with acute renal failure on HD and global anoxic brain injury and urothelial cancer; s/p tracheostomy pending possible G/J tube placement by surgery given no safe endoscopic window for PEG placement. Pending C discussions for further interventions.      NEURO:  #Global anoxic brain injury   AAOx0. On sedation with no further myoclonus. Guarded prognosis.   - Wean propofol as tolerated & c/w valium 30 mg TID.  - D/marjan fenatnyl   - S/p versed gtt, weaned off 3/23  - Repeat CT B 3/24 showing diffuse sulcal effacement with increased intracranial pressure, and few patchy foci of cortical blurring   - 3/25 MRIB with diffuse global abnormal supratentorial cortical restricted diffusion consistent with global hypoxic injury   -04/08/25: Patient still AAOx O with no response to voice or noxious stimuli    #Seizures  No further myoclonus of upper body after restarting propofol.   - Witness myoclonic jerking concerning for seizures iso anoxic brain injury  - s/p 2.5g keppra load & midazolam 2mg IV q8 PRN   - vEEG report: "Abundant myoclonic seizures in the setting of a severe diffuse/multifocal cerebral dysfunction which can be seen in the setting of sedative effect or due to an anoxic injury, with improvement after 20:50 suggesting a lowering of sedation"; will f/u with neuro recommendations. Repeat vEEG 3/24 showing severe cerebral dysfunction   -C/w levetiracetam 500 mg po BID & Diazepam 30 mg po TID. Attempt to wean off propofol.      CV:  #Shock - hemorrhagic shock s/p kidney biopsy s/p 5u pRBC, 3 plasma, 3 plt  last dose of lovenox AC on 3/18 at 5PM   Hgb 8 -> ~3.  Current Hg = 5-6  - Not on levophed, MAP goal>65  - Monitor cbc q24  - Midodrine 10mg TID & trend CBC daily for Hg goal   - Hg goal >6 per heme/onc    #Hx of VTE (R IJ thrombus, L brachial DVT)  - CTM, hold AC given hemorrhage  - Bullae present on arm with DVT  - Appreciate orthopedics hand surgery consult, not compartment syndrome       PULM:  #Acute hypoxic respiratory failure  #Pna- 2/2 ventilation requirement  #Tracheostomy   In the setting of PEA arrest. Triggering ventilator on CPAP. Still requiring breathing support.   CXR with R lung field and left mid and lower lung field hazy opacities.  - Wean vent as able  - continue with duonebs Q6H & s/p HTS  - 3/28 CXR showing increasing RUL consolidation   -4/4 bedside tracheostomy   -S/p Abx completion course as below and currently on meropenem 500 mg Q24H (04/02 - 04/07)    RENAL:  #Acute renal failure.   #Renal mass s/p biopsy -   #Acidosis- RESOLVED   Oliguric & receiving Bumex IVP PRN per nephro recs  Acute renal failure s/p cardiac arrest most likely 2/2 ATN requiring intermittent HD with nephro following  - TOV 3/28, failed; replaced daniel on 4/1; Attempt TOV (04/08/25)  - C/w doxazoin 1 mg po qhs to optimize subsequent TOV trial   - Trend Cr, uop, lytes  - S/p bicarb drip completion  -C/w sodium bicarbonate 650 mg po TID   - 3/31 removed L IJV shiley; 4/2 placed R fem shiley    - Per pathology result of renal biopsy, noninvasive urothelial malignancy   - Per nephro, HD session #1 3/24, HD sessions #2 3/25, HD session #3 3/27; planning HD #4 3/29; 4/3 HD #5; HD sessions per nephro. Removed R. femoral line (04/08) and follow up blood cxs 48 hrs after removal. Patient will need HD access.       GI:  #Shock liver  Unchanged  LFTs. Jaundice with hyperbilirubinemia (direct).   - Trend liver enzymes  - RUQ US showing enlarged periportal and periaortic enlarged lymph nodes and enlarged left lateral liver lobe and CBD 9 mm.     #Occult blood in feces  + Occult blood with dropping Hgb daily below goal of 6 ( Hx of SS). Patient with melena during hospital stay requiring pRBC to meet goal and with inappropriate Hgb response.  S/p 1 uPRBC 4/1, 1 uPRBC 4/2, 1 uPRBC 4/5, uPRBC 4/7, uPRBC 4/8, & uPRBC 4/9  Hold off on GI consult  Maintain active T & S  C/w PPI 40 mg IV BID for now.    #Diet:  - NPO except meds   - GI unable to place PEG due to hepatomegaly.   - Per IR consult: Given no safe window for endoscopic placement, recommend surgical consultation.   - Surgery consulted & following. Pending further GOCs discussion.      HEMATOLOGIC/ONCOLGOGIC   #Sickle cell crisis--Resolved  #Acute blood loss anemia- 2/2 sickle cells and uremia vs critical illness vs GIB  Downtrending direct hyperbilirubinemia,  gradually & Increasing reticulocyte count.   - Heme following & recs appreciated  -D/c deferasirox due to current renal failure   -S/p 1 uPRBC 4/1, 1 uPRBC 4/2, 1 uPRBC 4/5, uPRBC 4/7, & uPRBC 4/8 since beginning of April  - Occult blood positive. C/w PPI 40 mg IV BID for now.  - Transfuse as needed Hb >6 goal per Heme.    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 CXR showing increased RUL opacification.   - D/c  aztreonam 2000 mg IV qd ( started 3/22- 3/27)   - S/p vancomycin 1500 mg IV qd (4/2); 2nd dose 4/4, 3rd dose 4/5  - 4/2- CXR showing increasing RUL consolidation  - Completed 5d meropenem 500 mg IV qd (started 3/27-3/31); (4/2-4/7). Hold off Meropenem after 5 day course     MSK:  #infiltration  Infiltration of sodium bicarb into left arm, now with arm distension and bullae. Elevated uric acid with nodule suggegstive of podagra however no signs of active gout flare.   -Appreciate orthopedic hand surgery, not compartment syndrome   - CTM    Ethics: Full Code   Pending: G/J-tube placement for feeding by surgery team.  Palliative Team: Consulted   Assessment and Plan: 	  Patient is 44y Male with PMHx of sickle cell disease admitted for anemia concerning for sickle cell crisis s/p ureteroscopy w/ L kidney biopsy 3/19, post-operative course c/b acute hemorrhagic shock, PEA arrest with ROSC; currently with acute renal failure on HD and global anoxic brain injury and urothelial cancer; s/p tracheostomy and pending GOC discussions to decide on possible interventions and dispo.        NEURO:  #Global anoxic brain injury   AAOx0. On sedation with no further myoclonus. Guarded prognosis.   - Wean propofol as tolerated & c/w valium 30 mg TID.  - D/marjan fenatnyl   - S/p versed gtt, weaned off 3/23  - Repeat CT B 3/24 showing diffuse sulcal effacement with increased intracranial pressure, and few patchy foci of cortical blurring   - 3/25 MRIB with diffuse global abnormal supratentorial cortical restricted diffusion consistent with global hypoxic injury     #Seizures  No further myoclonus of upper body after restarting propofol.   - Witness myoclonic jerking concerning for seizures iso anoxic brain injury  - s/p 2.5g keppra load & midazolam 2mg IV q8 PRN   - vEEG report: "Abundant myoclonic seizures in the setting of a severe diffuse/multifocal cerebral dysfunction which can be seen in the setting of sedative effect or due to an anoxic injury, with improvement after 20:50 suggesting a lowering of sedation"; will f/u with neuro recommendations. Repeat vEEG 3/24 showing severe cerebral dysfunction   -C/w levetiracetam 500 mg po BID & Diazepam 30 mg po TID. Attempt to wean off propofol.      CV:  #Shock - hemorrhagic shock s/p kidney biopsy s/p 5u pRBC, 3 plasma, 3 plt  last dose of lovenox AC on 3/18 at 5PM   Hgb 8 -> ~3.  Current Hg = 5-6  - Not on levophed, MAP goal>65  - Monitor cbc q24  - Midodrine 10mg TID & trend CBC daily for Hg goal   - Hg goal >6 per heme/onc    #Hx of VTE (R IJ thrombus, L brachial DVT)  - CTM, hold AC given hemorrhage  - Bullae present on arm with DVT  - Appreciate orthopedics hand surgery consult, not compartment syndrome     PULM:  #Acute hypoxic respiratory failure  #Pna- 2/2 ventilation requirement  #Tracheostomy   CXR with R lung field and left mid and lower lung field hazy opacities.  - Wean vent as tolerated  - continue with duonebs Q6H & s/p HTS  - 3/28 CXR showing increasing RUL consolidation   -4/4 bedside tracheostomy   -S/p Abx completion course as below and currently on meropenem 500 mg Q24H (04/02 - 04/07)  -C/w trach care    RENAL:  #Acute renal failure.   #Renal mass s/p biopsy -   #Acidosis  Oliguric & receiving Bumex IVP PRN per nephro recs  Acute renal failure s/p cardiac arrest most likely 2/2 ATN requiring intermittent HD with nephro following  - TOV 3/28, failed; replaced daniel on 4/1; Attempt TOV when possible  - C/w Doxazosin  - Trend Cr, UOP, lytes  - S/p bicarb drip completion  - C/w sodium bicarbonate 650 mg po TID   - 3/31 removed L IJV shiley; 4/2 placed R fem shiley that was removed on 04/08  - Per pathology result of renal biopsy, noninvasive urothelial malignancy   - Per nephro, HD session #1 3/24, HD sessions #2 3/25, HD session #3 3/27; planning HD #4 3/29; 4/3 HD #5; HD sessions per nephro. Removed R. femoral line (04/08) & patient will need HD access (possible IR consult) pending C discussion    GI:  #Shock liver  Unchanged  LFTs. Jaundice with hyperbilirubinemia (direct).   - Trend liver enzymes  - RUQ US showing enlarged periportal and periaortic enlarged lymph nodes and enlarged left lateral liver lobe and CBD 9 mm.     #Occult blood in feces  + Occult blood with dropping Hgb daily below goal of 6 ( Hx of SS). Patient with melena during hospital stay requiring pRBC to meet goal and with inappropriate Hgb response.  S/p 1 uPRBC 4/1, 1 uPRBC 4/2, 1 uPRBC 4/5, uPRBC 4/7, uPRBC 4/8, & uPRBC 4/9  Hold off on GI consult  Maintain active T & S  C/w PPI 40 mg IV BID for now.    #Diet:  - NPO except meds   - GI unable to place PEG due to hepatomegaly.   - Per IR consult: Given no safe window for endoscopic placement, recommend surgical consultation.   - Surgery consulted & following. Pending further GOCs discussion.      HEMATOLOGIC/ONCOLGOGIC   #Sickle cell crisis--Resolved  #Acute blood loss anemia- 2/2 sickle cells and uremia vs critical illness vs GIB  Downtrending direct hyperbilirubinemia,  gradually & Increasing reticulocyte count.   - Heme following & recs appreciated  -D/c deferasirox due to current renal failure   -S/p 1 uPRBC 4/1, 1 uPRBC 4/2, 1 uPRBC 4/5, uPRBC 4/7, & uPRBC 4/8 since beginning of April  - Occult blood positive. C/w PPI 40 mg IV BID for now.  - Transfuse as needed Hb >6 goal per Heme.    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 CXR showing increased RUL opacification.   - D/c  aztreonam 2000 mg IV qd ( started 3/22- 3/27)   - S/p vancomycin 1500 mg IV qd (4/2); 2nd dose 4/4, 3rd dose 4/5  - 4/2- CXR showing increasing RUL consolidation  - Completed 5d meropenem 500 mg IV qd (started 3/27-3/31); (4/2-4/7). Hold off Meropenem after 5 day course     MSK:  #infiltration  Infiltration of sodium bicarb into left arm, now with arm distension and bullae. Elevated uric acid with nodule suggestive of podagra however no signs of active gout flare.   -Appreciate orthopedic hand surgery, not compartment syndrome   - CTM    Ethics: Full Code   Pending: Discussion with family  Palliative Team: Consulted

## 2025-04-09 NOTE — PROGRESS NOTE ADULT - SUBJECTIVE AND OBJECTIVE BOX
SURGERY DAILY PROGRESS NOTE:     Overnight Events:  patient actively being resuscitated with blood for hb 5.4  s/p 2 units in 24 hours    SUBJECTIVE:   Patient seen and evaluated on AM rounds.   pt sedated on propofol s/p anoxic brain injury  intubated  dark stool and urine noted    OBJECTIVE:  Vital Signs Last 24 Hrs  T(C): 37.1 (2025 07:00), Max: 38.2 (2025 20:00)  T(F): 98.8 (2025 07:00), Max: 100.8 (2025 20:00)  HR: 106 (2025 08:00) (99 - 123)  BP: 126/75 (2025 08:00) (88/48 - 126/75)  BP(mean): 87 (2025 08:00) (60 - 87)  RR: 18 (2025 08:00) (16 - 32)  SpO2: 96% (2025 08:00) (93% - 100%)    Parameters below as of 2025 08:00  Patient On (Oxygen Delivery Method): ventilator, CPAP 15/6    O2 Concentration (%): 50  Daily     Daily Weight in k.4 (2025 13:45)  I&O's Detail    2025 07:01  -  2025 07:00  --------------------------------------------------------  IN:    Enteral Tube Flush: 100 mL    IV PiggyBack: 100 mL    Other (mL): 400 mL    PRBCs (Packed Red Blood Cells): 300 mL    Propofol: 319.2 mL  Total IN: 1219.2 mL    OUT:    Indwelling Catheter - Urethral (mL): 230 mL    Other (mL): 1900 mL    Stool (mL): 700 mL    Voided (mL): 5 mL  Total OUT: 2835 mL    Total NET: -1615.8 mL      2025 07:01  -  2025 09:08  --------------------------------------------------------  IN:    Propofol: 26.6 mL  Total IN: 26.6 mL    OUT:    Indwelling Catheter - Urethral (mL): 60 mL  Total OUT: 60 mL    Total NET: -33.4 mL                              5.4    18.87 )-----------( 266      ( 2025 00:55 )             14.1     04    136  |  97[L]  |  68[H]  ----------------------------<  112[H]  3.0[L]   |  21[L]  |  2.76[H]    Ca    8.9      2025 00:55  Phos  5.1       Mg     1.90         TPro  6.4  /  Alb  2.3[L]  /  TBili  13.7[H]  /  DBili  x   /  AST  103[H]  /  ALT  22  /  AlkPhos  214[H]      PT/INR - ( 2025 00:55 )   PT: 12.8 sec;   INR: 1.10 ratio         PTT - ( 2025 00:55 )  PTT:28.9 sec  Urinalysis Basic - ( 2025 00:55 )    Color: x / Appearance: x / SG: x / pH: x  Gluc: 112 mg/dL / Ketone: x  / Bili: x / Urobili: x   Blood: x / Protein: x / Nitrite: x   Leuk Esterase: x / RBC: x / WBC x   Sq Epi: x / Non Sq Epi: x / Bacteria: x      PHYSICAL EXAM:  Constitutional: sedated on propofol gtt  Pulmonary: intubated  Gastrointestinal: Abdomen soft, nontender, distended. No facial grimace to palpation. No obvious masses. flexiseal in place with dark brown stool  Groin: daniel with dark urine  Neuro: sedated on propofol gtt, no blink to threat, no movement to painful stimuli nail press in all 4 extremities

## 2025-04-09 NOTE — PROGRESS NOTE ADULT - ASSESSMENT
44M with history of sickle cell disease with multiple crises, urothelial ca, HTN, RUE DVT on Eliquis (held since 3/15), admitted for anemia on outpatient blood work. Underwent L ureteroscopy, L kidney mass biopsy, and stent placement 3/19/25 c/b perinephric hemorrhagic shock with PEA arrest, ROSC after 4 minutes however unclear anoxic time; s/p IR embolization of left renal artery 3/20.     Unclear prognosis regarding neurologic recovery, per discussion with family at bedside "he said if he would be a vegetable to pull the plug" but "as long as there is a chance do everything." Per GI and IR no safe window for percutaneous/endoscopic placement due to hepatomegaly.   Surgery consulted for surgical feeding G or J tube placement.    RECCS  - formal neurologic exam off sedation  - resuscitate prn  - GOC discussion with family pending formal neurologic exam and prognosis appreciated  - f/u with GI for possible lower GI bleed i/s/o continued bleeding  and dark stools  - patient at this time is clinically unstable for a surgical feeding tube placement, especially i/s/o poor prognosis documented  - surgery to sign off at this time, please call back when patient is medical stable and optimized for surgical feeding tube placement then will consider    B Team Surgery  [id number]

## 2025-04-09 NOTE — PROGRESS NOTE ADULT - ATTENDING COMMENTS
MATA  ATN  Dialysis Dependent    Plan for HD tomorrow  Please consult IR for a tunneled HD catheter  Monitor labs and Is/Os

## 2025-04-09 NOTE — PROGRESS NOTE ADULT - SUBJECTIVE AND OBJECTIVE BOX
PROGRESS NOTE:   Authored by Dr. Dawson Bella MD (PGY-1).     Patient is a 44y old  Male who presents with a chief complaint of Referred by Hematologist for low Hg (08 Apr 2025 12:35)      SUBJECTIVE / OVERNIGHT EVENTS:  R. Femoral shiley removed yesterday evening. Hgb = 5.4 and patient received 1 U of PRBC.    ROS: Could not be elicited due to AAOx0    MEDICATIONS  (STANDING):  albuterol/ipratropium for Nebulization 3 milliLiter(s) Nebulizer every 6 hours  allopurinol 50 milliGRAM(s) Oral <User Schedule>  chlorhexidine 0.12% Liquid 15 milliLiter(s) Oral Mucosa every 12 hours  chlorhexidine 2% Cloths 1 Application(s) Topical daily  diazepam    Tablet 30 milliGRAM(s) Oral three times a day  doxazosin 1 milliGRAM(s) Oral at bedtime  folic acid 1 milliGRAM(s) Oral daily  influenza   Vaccine 0.5 milliLiter(s) IntraMuscular once  levETIRAcetam  Solution 500 milliGRAM(s) Oral two times a day  midodrine 10 milliGRAM(s) Oral every 8 hours  pantoprazole  Injectable 40 milliGRAM(s) IV Push every 12 hours  petrolatum Ophthalmic Ointment 1 Application(s) Both EYES two times a day  potassium chloride   Powder 40 milliEquivalent(s) Oral once  propofol Infusion 50 MICROgram(s)/kG/Min (26.6 mL/Hr) IV Continuous <Continuous>  sodium bicarbonate 650 milliGRAM(s) Oral three times a day    MEDICATIONS  (PRN):      CAPILLARY BLOOD GLUCOSE      POCT Blood Glucose.: 118 mg/dL (09 Apr 2025 06:13)  POCT Blood Glucose.: 127 mg/dL (08 Apr 2025 23:21)  POCT Blood Glucose.: 105 mg/dL (08 Apr 2025 11:17)    I&O's Summary    08 Apr 2025 07:01  -  09 Apr 2025 07:00  --------------------------------------------------------  IN: 1205.9 mL / OUT: 2835 mL / NET: -1629.1 mL          Vital Signs Last 24 Hrs  T(C): 36.7 (09 Apr 2025 03:15), Max: 38.2 (08 Apr 2025 20:00)  T(F): 98.1 (09 Apr 2025 03:15), Max: 100.8 (08 Apr 2025 20:00)  HR: 108 (09 Apr 2025 06:00) (99 - 123)  BP: 109/66 (09 Apr 2025 06:00) (88/48 - 120/64)  BP(mean): 78 (09 Apr 2025 06:00) (60 - 80)  RR: 28 (09 Apr 2025 06:00) (17 - 32)  SpO2: 99% (09 Apr 2025 06:00) (93% - 100%)    Parameters below as of 09 Apr 2025 06:00  Patient On (Oxygen Delivery Method): ventilator        PHYSICAL EXAM:  General: NAD, normal habitus, mild jaundice   Neuro: AAOx0, sedated non responsive to noxious stimuli, closed eyes, no verbal sounds, 4 mm pupils, sluggishly reactive pupils b/l   HEENT: icteric sclerae b/l, tracheostomy clean/dry/intact  Chest: non-tender to palpation  Heart: S1/S2, RRR   Lungs: No adventitious sounds appreciated on auscultation  Abd: soft, non-tender, but slightly distended.  Ext: Generalized trace pitting edema, LUE wrapped, nodule on R 1st digit proximal phalanges   Pulses: radial 2+ b/l, dorsalis pedis 2+ b/l   Lines/tubes/drains: NGT, Dave, & Flexiseal  Psych: unable to assess       LABS:                        5.4    18.87 )-----------( 266      ( 09 Apr 2025 00:55 )             14.1     04-09    136  |  97[L]  |  68[H]  ----------------------------<  112[H]  3.0[L]   |  21[L]  |  2.76[H]    Ca    8.9      09 Apr 2025 00:55  Phos  5.1     04-09  Mg     1.90     04-09    TPro  6.4  /  Alb  2.3[L]  /  TBili  13.7[H]  /  DBili  x   /  AST  103[H]  /  ALT  22  /  AlkPhos  214[H]  04-09    PT/INR - ( 09 Apr 2025 00:55 )   PT: 12.8 sec;   INR: 1.10 ratio         PTT - ( 09 Apr 2025 00:55 )  PTT:28.9 sec      Urinalysis Basic - ( 09 Apr 2025 00:55 )    Color: x / Appearance: x / SG: x / pH: x  Gluc: 112 mg/dL / Ketone: x  / Bili: x / Urobili: x   Blood: x / Protein: x / Nitrite: x   Leuk Esterase: x / RBC: x / WBC x   Sq Epi: x / Non Sq Epi: x / Bacteria: x        Culture - Sputum (collected 08 Apr 2025 08:00)  Source: ET Tube ET Tube  Gram Stain (08 Apr 2025 19:33):    Moderate Squamous epithelial cells per low power field    Few polymorphonuclear leukocytes per low power field    Few Yeast like cells per oil power field           PROGRESS NOTE:   Authored by Dr. Dawson Bella MD (PGY-1).     Patient is a 44y old  Male who presents with a chief complaint of Referred by Hematologist for low Hg (08 Apr 2025 12:35)      SUBJECTIVE / OVERNIGHT EVENTS:  R. Femoral shiley removed yesterday evening. Hgb = 5.4 and patient received 1 U of PRBC.    ROS: Could not be elicited due to AAOx0    MEDICATIONS  (STANDING):  albuterol/ipratropium for Nebulization 3 milliLiter(s) Nebulizer every 6 hours  allopurinol 50 milliGRAM(s) Oral <User Schedule>  chlorhexidine 0.12% Liquid 15 milliLiter(s) Oral Mucosa every 12 hours  chlorhexidine 2% Cloths 1 Application(s) Topical daily  diazepam    Tablet 30 milliGRAM(s) Oral three times a day  doxazosin 1 milliGRAM(s) Oral at bedtime  folic acid 1 milliGRAM(s) Oral daily  influenza   Vaccine 0.5 milliLiter(s) IntraMuscular once  levETIRAcetam  Solution 500 milliGRAM(s) Oral two times a day  midodrine 10 milliGRAM(s) Oral every 8 hours  pantoprazole  Injectable 40 milliGRAM(s) IV Push every 12 hours  petrolatum Ophthalmic Ointment 1 Application(s) Both EYES two times a day  potassium chloride   Powder 40 milliEquivalent(s) Oral once  propofol Infusion 50 MICROgram(s)/kG/Min (26.6 mL/Hr) IV Continuous <Continuous>  sodium bicarbonate 650 milliGRAM(s) Oral three times a day    MEDICATIONS  (PRN):      CAPILLARY BLOOD GLUCOSE      POCT Blood Glucose.: 118 mg/dL (09 Apr 2025 06:13)  POCT Blood Glucose.: 127 mg/dL (08 Apr 2025 23:21)  POCT Blood Glucose.: 105 mg/dL (08 Apr 2025 11:17)    I&O's Summary    08 Apr 2025 07:01  -  09 Apr 2025 07:00  --------------------------------------------------------  IN: 1205.9 mL / OUT: 2835 mL / NET: -1629.1 mL          Vital Signs Last 24 Hrs  T(C): 36.7 (09 Apr 2025 03:15), Max: 38.2 (08 Apr 2025 20:00)  T(F): 98.1 (09 Apr 2025 03:15), Max: 100.8 (08 Apr 2025 20:00)  HR: 108 (09 Apr 2025 06:00) (99 - 123)  BP: 109/66 (09 Apr 2025 06:00) (88/48 - 120/64)  BP(mean): 78 (09 Apr 2025 06:00) (60 - 80)  RR: 28 (09 Apr 2025 06:00) (17 - 32)  SpO2: 99% (09 Apr 2025 06:00) (93% - 100%)    Parameters below as of 09 Apr 2025 06:00  Patient On (Oxygen Delivery Method): ventilator        PHYSICAL EXAM:  General: NAD, normal habitus, mild jaundice   Neuro: AAOx0, sedated non responsive to noxious stimuli, closed eyes, no verbal sounds, 4 mm pupils, sluggishly reactive pupils b/l   HEENT: icteric sclerae b/l, tracheostomy clean/dry/intact  Chest: non-tender to palpation  Heart: S1/S2, RRR   Lungs: Diffuse coarse breath sounds  Abd: soft, non-tender, but slightly distended.  Ext: Generalized trace pitting edema, LUE wrapped, nodule on R 1st digit proximal phalanges   Pulses: radial 2+ b/l, dorsalis pedis 2+ b/l   Lines/tubes/drains: NGT, Dave, & Flexiseal  Psych: unable to assess       LABS:                        5.4    18.87 )-----------( 266      ( 09 Apr 2025 00:55 )             14.1     04-09    136  |  97[L]  |  68[H]  ----------------------------<  112[H]  3.0[L]   |  21[L]  |  2.76[H]    Ca    8.9      09 Apr 2025 00:55  Phos  5.1     04-09  Mg     1.90     04-09    TPro  6.4  /  Alb  2.3[L]  /  TBili  13.7[H]  /  DBili  x   /  AST  103[H]  /  ALT  22  /  AlkPhos  214[H]  04-09    PT/INR - ( 09 Apr 2025 00:55 )   PT: 12.8 sec;   INR: 1.10 ratio         PTT - ( 09 Apr 2025 00:55 )  PTT:28.9 sec      Urinalysis Basic - ( 09 Apr 2025 00:55 )    Color: x / Appearance: x / SG: x / pH: x  Gluc: 112 mg/dL / Ketone: x  / Bili: x / Urobili: x   Blood: x / Protein: x / Nitrite: x   Leuk Esterase: x / RBC: x / WBC x   Sq Epi: x / Non Sq Epi: x / Bacteria: x        Culture - Sputum (collected 08 Apr 2025 08:00)  Source: ET Tube ET Tube  Gram Stain (08 Apr 2025 19:33):    Moderate Squamous epithelial cells per low power field    Few polymorphonuclear leukocytes per low power field    Few Yeast like cells per oil power field           PROGRESS NOTE:   Authored by Dr. Dawson Bella MD (PGY-1).     Patient is a 44y old  Male who presents with a chief complaint of Referred by Hematologist for low Hg (08 Apr 2025 12:35)      SUBJECTIVE / OVERNIGHT EVENTS:  R. Femoral shiley removed yesterday evening. Hgb = 5.4 and patient received 1 U of PRBC.    ROS: Could not be elicited due to AAOx0    MEDICATIONS  (STANDING):  albuterol/ipratropium for Nebulization 3 milliLiter(s) Nebulizer every 6 hours  allopurinol 50 milliGRAM(s) Oral <User Schedule>  chlorhexidine 0.12% Liquid 15 milliLiter(s) Oral Mucosa every 12 hours  chlorhexidine 2% Cloths 1 Application(s) Topical daily  diazepam    Tablet 30 milliGRAM(s) Oral three times a day  doxazosin 1 milliGRAM(s) Oral at bedtime  folic acid 1 milliGRAM(s) Oral daily  influenza   Vaccine 0.5 milliLiter(s) IntraMuscular once  levETIRAcetam  Solution 500 milliGRAM(s) Oral two times a day  midodrine 10 milliGRAM(s) Oral every 8 hours  pantoprazole  Injectable 40 milliGRAM(s) IV Push every 12 hours  petrolatum Ophthalmic Ointment 1 Application(s) Both EYES two times a day  potassium chloride   Powder 40 milliEquivalent(s) Oral once  propofol Infusion 50 MICROgram(s)/kG/Min (26.6 mL/Hr) IV Continuous <Continuous>  sodium bicarbonate 650 milliGRAM(s) Oral three times a day    MEDICATIONS  (PRN):      CAPILLARY BLOOD GLUCOSE      POCT Blood Glucose.: 118 mg/dL (09 Apr 2025 06:13)  POCT Blood Glucose.: 127 mg/dL (08 Apr 2025 23:21)  POCT Blood Glucose.: 105 mg/dL (08 Apr 2025 11:17)    I&O's Summary    08 Apr 2025 07:01  -  09 Apr 2025 07:00  --------------------------------------------------------  IN: 1205.9 mL / OUT: 2835 mL / NET: -1629.1 mL          Vital Signs Last 24 Hrs  T(C): 36.7 (09 Apr 2025 03:15), Max: 38.2 (08 Apr 2025 20:00)  T(F): 98.1 (09 Apr 2025 03:15), Max: 100.8 (08 Apr 2025 20:00)  HR: 108 (09 Apr 2025 06:00) (99 - 123)  BP: 109/66 (09 Apr 2025 06:00) (88/48 - 120/64)  BP(mean): 78 (09 Apr 2025 06:00) (60 - 80)  RR: 28 (09 Apr 2025 06:00) (17 - 32)  SpO2: 99% (09 Apr 2025 06:00) (93% - 100%)    Parameters below as of 09 Apr 2025 06:00  Patient On (Oxygen Delivery Method): ventilator        PHYSICAL EXAM:  General: NAD, normal habitus, mild jaundice   Neuro: AAOx0, sedated & non responsive to noxious stimuli, closed eyes, no verbal sounds, 4 mm pupils, sluggishly reactive pupils b/l   HEENT: icteric sclerae b/l, tracheostomy clean/dry/intact  Chest: non-tender to palpation  Heart: S1/S2, RRR   Lungs: Diffuse coarse breath sounds  Abd: soft, non-tender, but slightly distended.  Ext: Generalized trace pitting edema, LUE wrapped, nodule on R 1st digit proximal phalanges   Pulses: radial 2+ b/l, dorsalis pedis 2+ b/l   Lines/tubes/drains: NGT, Dave, & Flexiseal  Psych: unable to assess       LABS:                        5.4    18.87 )-----------( 266      ( 09 Apr 2025 00:55 )             14.1     04-09    136  |  97[L]  |  68[H]  ----------------------------<  112[H]  3.0[L]   |  21[L]  |  2.76[H]    Ca    8.9      09 Apr 2025 00:55  Phos  5.1     04-09  Mg     1.90     04-09    TPro  6.4  /  Alb  2.3[L]  /  TBili  13.7[H]  /  DBili  x   /  AST  103[H]  /  ALT  22  /  AlkPhos  214[H]  04-09    PT/INR - ( 09 Apr 2025 00:55 )   PT: 12.8 sec;   INR: 1.10 ratio         PTT - ( 09 Apr 2025 00:55 )  PTT:28.9 sec      Urinalysis Basic - ( 09 Apr 2025 00:55 )    Color: x / Appearance: x / SG: x / pH: x  Gluc: 112 mg/dL / Ketone: x  / Bili: x / Urobili: x   Blood: x / Protein: x / Nitrite: x   Leuk Esterase: x / RBC: x / WBC x   Sq Epi: x / Non Sq Epi: x / Bacteria: x        Culture - Sputum (collected 08 Apr 2025 08:00)  Source: ET Tube ET Tube  Gram Stain (08 Apr 2025 19:33):    Moderate Squamous epithelial cells per low power field    Few polymorphonuclear leukocytes per low power field    Few Yeast like cells per oil power field

## 2025-04-09 NOTE — PROGRESS NOTE ADULT - PROBLEM SELECTOR PLAN 1
Pt w/ oliguric MATA iso hemorrhagic shock and PEA arrest. Likely ATN. UA (3/16) w/ proteinuria and hematuria (21 RBC). Renal US (3/20) w/o hydronephrosis, and w/ large left renal subcapsular hematoma. CXR (3/22) w/ b/l hazy opacities. MRI w/ global hypoxic injury.   -NTDC placed and underwent HD on 3/24 iso worsening oliguric renal failure, w/ associated hyperkalemia and hypervolemia. Pt was anuric despite prior diuretic challenge. IJ NTDC removed 3/31 after HD on 3/29 as pt was non-oliguric and Cr plateaued to ~5.1-5.4. However, labs on 4/3 revealed elevated  and acidotic SCO2 14. Since pt planned for trach/PEG on 4/4, HD was done on 4/3 to optimize him for trach/peg (4/4) via new right femoral NTDC placed on 4/2.     -Pt oliguric, UOP 230cc/24hr. Last HD 4/8, tolerated 1.5L UF. Will need chronic HD TIW. Next HD tomorrow.   -Please get IR for tunneled HD cath placement   -Transfuse as needed.      Eldon Hamilton  Nephrology Fellow  Feel free to contact me on TEAMS  After 5 pm and on weekends please contact the on-call Fellow. Pt w/ oliguric MATA iso hemorrhagic shock and PEA arrest. Likely ATN. UA (3/16) w/ proteinuria and hematuria (21 RBC). Renal US (3/20) w/o hydronephrosis, and w/ large left renal subcapsular hematoma. CXR (3/22) w/ b/l hazy opacities. MRI w/ global hypoxic injury.   -NTDC placed and underwent HD on 3/24 iso worsening oliguric renal failure, w/ associated hyperkalemia and hypervolemia. Pt was anuric despite prior diuretic challenge. IJ NTDC removed 3/31 after HD on 3/29 as pt was non-oliguric and Cr plateaued to ~5.1-5.4. However, labs on 4/3 revealed elevated  and acidotic SCO2 14. Since pt planned for trach/PEG on 4/4, HD was done on 4/3 to optimize him for trach/peg (4/4) via new right femoral NTDC placed on 4/2.     -Pt oliguric, UOP 230cc/24hr. Last HD 4/8, tolerated 1.5L UF. Will need chronic HD TIW. Next HD tomorrow.   -Please get IR for tunneled HD cath placement   -Replete K and keep >3.5  -Transfuse as needed.      Eldon Hamilton  Nephrology Fellow  Feel free to contact me on TEAMS  After 5 pm and on weekends please contact the on-call Fellow.

## 2025-04-09 NOTE — PROGRESS NOTE ADULT - ATTENDING COMMENTS
1. Acute hypoxemic respiratory failure s/p cardiac arrest. Pt now with tracheostomy tube. Tolerating AC vent setting. Did not tolerate PS trial today.  2. Neuro: Anoxic brain injury. Pt with myoclonic seizures. Continue Keppra. Increase Valium to 30mg tid. . Taper off propofol as tolerated.  3. Surgical input for PEG/PEJ  4.ID. Pt started on meropenem for fevers.  Finished 5 day course of meropenem.  5.HEME. H/O ss anemia. Keep HB>6. Transfused 1 unit PRBC yesterday and today.   6. Renal: Acute renal failure s/p cardiac arrest. Likely ATN. Intermittent HD .  7. Renal bx with urothelial cancer.  8.DVT prophylaxis: SCD  9. GOC: Full code . Discussed with family that pt will never be independent and will live in nursing home for the rest of his life. One family member said he had discussed possibility and being very sick. "He would never want  to be a vegetable."   Trying to assemble a  family meeting or call.

## 2025-04-09 NOTE — CHART NOTE - NSCHARTNOTEFT_GEN_A_CORE
: Chato Magdaleno    INDICATION: AHRF    PROCEDURE:  [ ] LIMITED ECHO  [X] LIMITED CHEST  [ ] LIMITED RETROPERITONEAL  [ ] LIMITED ABDOMINAL  [ ] LIMITED DVT  [ ] NEEDLE GUIDANCE VASCULAR  [ ] NEEDLE GUIDANCE THORACENTESIS  [ ] NEEDLE GUIDANCE PARACENTESIS  [ ] NEEDLE GUIDANCE PERICARDIOCENTESIS  [ ] OTHER    FINDINGS/INTERPRETATION:  Lungs  Bilateral lung sliding  Right chest B-line predominant : Chato Magdaleno    INDICATION: AHRF    PROCEDURE:  [ ] LIMITED ECHO  [X] LIMITED CHEST  [ ] LIMITED RETROPERITONEAL  [ ] LIMITED ABDOMINAL  [ ] LIMITED DVT  [ ] NEEDLE GUIDANCE VASCULAR  [ ] NEEDLE GUIDANCE THORACENTESIS  [ ] NEEDLE GUIDANCE PARACENTESIS  [ ] NEEDLE GUIDANCE PERICARDIOCENTESIS  [ ] OTHER    FINDINGS/INTERPRETATION:  Lungs  Bilateral lung sliding  Right chest B-line predominant  Left chest A-line predominant  Small consolidation on left side  Ascites and small consolidation on right side    Heart  poor cardiac window    Images stored in [employer] : Antonio Brandon    INDICATION: AHRF    PROCEDURE:  [ ] LIMITED ECHO  [X] LIMITED CHEST  [ ] LIMITED RETROPERITONEAL  [ ] LIMITED ABDOMINAL  [ ] LIMITED DVT  [ ] NEEDLE GUIDANCE VASCULAR  [ ] NEEDLE GUIDANCE THORACENTESIS  [ ] NEEDLE GUIDANCE PARACENTESIS  [ ] NEEDLE GUIDANCE PERICARDIOCENTESIS  [ ] OTHER    FINDINGS/INTERPRETATION:  Lungs  Bilateral lung sliding  Right chest B-line predominant  Left chest A-line predominant  Small consolidation on left side  Ascites and small consolidation on right side    Heart  poor cardiac window    Images stored in [employer] : Antonio Marquezg    INDICATION: AHRF    PROCEDURE:  [ ] LIMITED ECHO  [X] LIMITED CHEST  [ ] LIMITED RETROPERITONEAL  [ ] LIMITED ABDOMINAL  [ ] LIMITED DVT  [ ] NEEDLE GUIDANCE VASCULAR  [ ] NEEDLE GUIDANCE THORACENTESIS  [ ] NEEDLE GUIDANCE PARACENTESIS  [ ] NEEDLE GUIDANCE PERICARDIOCENTESIS  [ ] OTHER    FINDINGS/INTERPRETATION:  Lungs  Bilateral lung sliding  Right anterior chest B-line predominant  Left anterior chest A-line predominant  Small consolidation on left side  Ascites and small consolidation on right side    Heart  poor cardiac window    Images stored in [employer] : Antonio Marquezg    INDICATION: AHRF    PROCEDURE:  [ ] LIMITED ECHO  [X] LIMITED CHEST  [ ] LIMITED RETROPERITONEAL  [ ] LIMITED ABDOMINAL  [ ] LIMITED DVT  [ ] NEEDLE GUIDANCE VASCULAR  [ ] NEEDLE GUIDANCE THORACENTESIS  [ ] NEEDLE GUIDANCE PARACENTESIS  [ ] NEEDLE GUIDANCE PERICARDIOCENTESIS  [ ] OTHER    FINDINGS/INTERPRETATION:  Lungs  Bilateral lung sliding  Right anterior chest B-line predominant  Left anterior chest A-line predominant  Small consolidation on left side  Ascites and small consolidation on right side    Heart  Hyperdynamic LV     Images stored in [employer] : Antonio Brandon    INDICATION: AHRF    PROCEDURE:  [ ] LIMITED ECHO  [X] LIMITED CHEST  [ ] LIMITED RETROPERITONEAL  [ ] LIMITED ABDOMINAL  [ ] LIMITED DVT  [ ] NEEDLE GUIDANCE VASCULAR  [ ] NEEDLE GUIDANCE THORACENTESIS  [ ] NEEDLE GUIDANCE PARACENTESIS  [ ] NEEDLE GUIDANCE PERICARDIOCENTESIS  [ ] OTHER    FINDINGS/INTERPRETATION:  Lungs  Bilateral lung sliding  Right anterior chest B-line predominant  Left anterior chest A-line predominant  Small consolidation on left side  Ascites and small consolidation on right side    Heart  HR elevated during examination; limiting study;  Normal LV function    Images stored in [employer] : Antonio Brandon    INDICATION: AHRF    PROCEDURE:  [ ] LIMITED ECHO  [X] LIMITED CHEST  [ ] LIMITED RETROPERITONEAL  [ ] LIMITED ABDOMINAL  [ ] LIMITED DVT  [ ] NEEDLE GUIDANCE VASCULAR  [ ] NEEDLE GUIDANCE THORACENTESIS  [ ] NEEDLE GUIDANCE PARACENTESIS  [ ] NEEDLE GUIDANCE PERICARDIOCENTESIS  [ ] OTHER    FINDINGS/INTERPRETATION:  Lungs  Bilateral lung sliding  Right anterior chest B-line predominant  Left anterior chest A-line predominant  Small consolidation on left side  Ascites and small consolidation on right side    Heart  HR elevated during examination; limiting study  Normal LV and RV function    Images stored in [employer] : Antonio Marquezg    INDICATION: AHRF    PROCEDURE:  [x ] LIMITED ECHO  [X] LIMITED CHEST  [ ] LIMITED RETROPERITONEAL  [ ] LIMITED ABDOMINAL  [ ] LIMITED DVT  [ ] NEEDLE GUIDANCE VASCULAR  [ ] NEEDLE GUIDANCE THORACENTESIS  [ ] NEEDLE GUIDANCE PARACENTESIS  [ ] NEEDLE GUIDANCE PERICARDIOCENTESIS  [ ] OTHER    FINDINGS/INTERPRETATION:  Lungs  Bilateral lung sliding  Right anterior chest B-line predominant  Left anterior chest A-line predominant  Small consolidation on left side  Ascites and small consolidation on right side    Heart    Normal LV and RV systolic  function    Images stored in [employer]    I have assisted and supervised entire procedure.     Lucien Aguero MD.

## 2025-04-10 LAB
ALBUMIN SERPL ELPH-MCNC: 2.4 G/DL — LOW (ref 3.3–5)
ALP SERPL-CCNC: 187 U/L — HIGH (ref 40–120)
ALT FLD-CCNC: 24 U/L — SIGNIFICANT CHANGE UP (ref 4–41)
ANION GAP SERPL CALC-SCNC: 15 MMOL/L — HIGH (ref 7–14)
APTT BLD: 26.6 SEC — SIGNIFICANT CHANGE UP (ref 24.5–35.6)
AST SERPL-CCNC: 109 U/L — HIGH (ref 4–40)
BASOPHILS # BLD AUTO: 0.13 K/UL — SIGNIFICANT CHANGE UP (ref 0–0.2)
BASOPHILS NFR BLD AUTO: 0.7 % — SIGNIFICANT CHANGE UP (ref 0–2)
BILIRUB DIRECT SERPL-MCNC: 8.8 MG/DL — HIGH (ref 0–0.3)
BILIRUB INDIRECT FLD-MCNC: 1.4 MG/DL — HIGH (ref 0–1)
BILIRUB SERPL-MCNC: 10.2 MG/DL — HIGH (ref 0.2–1.2)
BILIRUB SERPL-MCNC: 10.2 MG/DL — HIGH (ref 0.2–1.2)
BLOOD GAS VENOUS COMPREHENSIVE RESULT: SIGNIFICANT CHANGE UP
BUN SERPL-MCNC: 94 MG/DL — HIGH (ref 7–23)
CA-I BLD-SCNC: 1.23 MMOL/L — SIGNIFICANT CHANGE UP (ref 1.15–1.29)
CALCIUM SERPL-MCNC: 9.1 MG/DL — SIGNIFICANT CHANGE UP (ref 8.4–10.5)
CHLORIDE SERPL-SCNC: 97 MMOL/L — LOW (ref 98–107)
CO2 SERPL-SCNC: 21 MMOL/L — LOW (ref 22–31)
CREAT SERPL-MCNC: 3.5 MG/DL — HIGH (ref 0.5–1.3)
CULTURE RESULTS: ABNORMAL
EGFR: 21 ML/MIN/1.73M2 — LOW
EGFR: 21 ML/MIN/1.73M2 — LOW
EOSINOPHIL # BLD AUTO: 1.45 K/UL — HIGH (ref 0–0.5)
EOSINOPHIL NFR BLD AUTO: 7.4 % — HIGH (ref 0–6)
GLUCOSE BLDC GLUCOMTR-MCNC: 113 MG/DL — HIGH (ref 70–99)
GLUCOSE BLDC GLUCOMTR-MCNC: 115 MG/DL — HIGH (ref 70–99)
GLUCOSE BLDC GLUCOMTR-MCNC: 116 MG/DL — HIGH (ref 70–99)
GLUCOSE BLDC GLUCOMTR-MCNC: 120 MG/DL — HIGH (ref 70–99)
GLUCOSE BLDC GLUCOMTR-MCNC: 131 MG/DL — HIGH (ref 70–99)
GLUCOSE SERPL-MCNC: 103 MG/DL — HIGH (ref 70–99)
HAPTOGLOB SERPL-MCNC: <20 MG/DL — LOW (ref 34–200)
HCT VFR BLD CALC: 16 % — CRITICAL LOW (ref 39–50)
HGB BLD-MCNC: 5.3 G/DL — CRITICAL LOW (ref 13–17)
IANC: 12.74 K/UL — HIGH (ref 1.8–7.4)
IMM GRANULOCYTES NFR BLD AUTO: 2.6 % — HIGH (ref 0–0.9)
INR BLD: 1.03 RATIO — SIGNIFICANT CHANGE UP (ref 0.85–1.16)
LYMPHOCYTES # BLD AUTO: 12.3 % — LOW (ref 13–44)
LYMPHOCYTES # BLD AUTO: 2.42 K/UL — SIGNIFICANT CHANGE UP (ref 1–3.3)
MAGNESIUM SERPL-MCNC: 2 MG/DL — SIGNIFICANT CHANGE UP (ref 1.6–2.6)
MCHC RBC-ENTMCNC: 29.9 PG — SIGNIFICANT CHANGE UP (ref 27–34)
MCHC RBC-ENTMCNC: 33.1 G/DL — SIGNIFICANT CHANGE UP (ref 32–36)
MCV RBC AUTO: 79.7 FL — LOW (ref 80–100)
MONOCYTES # BLD AUTO: 2.35 K/UL — HIGH (ref 0–0.9)
MONOCYTES NFR BLD AUTO: 12 % — SIGNIFICANT CHANGE UP (ref 2–14)
NEUTROPHILS # BLD AUTO: 12.74 K/UL — HIGH (ref 1.8–7.4)
NEUTROPHILS NFR BLD AUTO: 65 % — SIGNIFICANT CHANGE UP (ref 43–77)
NRBC # BLD AUTO: 0.32 K/UL — HIGH (ref 0–0)
NRBC # FLD: 0.32 K/UL — HIGH (ref 0–0)
NRBC BLD AUTO-RTO: 2 /100 WBCS — HIGH (ref 0–0)
PHOSPHATE SERPL-MCNC: 6 MG/DL — HIGH (ref 2.5–4.5)
PLATELET # BLD AUTO: 271 K/UL — SIGNIFICANT CHANGE UP (ref 150–400)
POTASSIUM SERPL-MCNC: 3.9 MMOL/L — SIGNIFICANT CHANGE UP (ref 3.5–5.3)
POTASSIUM SERPL-SCNC: 3.9 MMOL/L — SIGNIFICANT CHANGE UP (ref 3.5–5.3)
PROT SERPL-MCNC: 6.3 G/DL — SIGNIFICANT CHANGE UP (ref 6–8.3)
PROTHROM AB SERPL-ACNC: 12.3 SEC — SIGNIFICANT CHANGE UP (ref 9.9–13.4)
RBC # BLD: 1.77 M/UL — LOW (ref 4.2–5.8)
RBC # BLD: 1.77 M/UL — LOW (ref 4.2–5.8)
RBC # FLD: 18.3 % — HIGH (ref 10.3–14.5)
RETICS #: 109.7 K/UL — SIGNIFICANT CHANGE UP (ref 25–125)
RETICS/RBC NFR: 6.2 % — HIGH (ref 0.5–2.5)
SODIUM SERPL-SCNC: 133 MMOL/L — LOW (ref 135–145)
SPECIMEN SOURCE: SIGNIFICANT CHANGE UP
WBC # BLD: 19.6 K/UL — HIGH (ref 3.8–10.5)
WBC # FLD AUTO: 19.6 K/UL — HIGH (ref 3.8–10.5)

## 2025-04-10 PROCEDURE — 99233 SBSQ HOSP IP/OBS HIGH 50: CPT | Mod: GC

## 2025-04-10 PROCEDURE — 99291 CRITICAL CARE FIRST HOUR: CPT | Mod: GC

## 2025-04-10 PROCEDURE — 76604 US EXAM CHEST: CPT | Mod: 26,GC

## 2025-04-10 PROCEDURE — 99291 CRITICAL CARE FIRST HOUR: CPT | Mod: GC,25

## 2025-04-10 PROCEDURE — 93308 TTE F-UP OR LMTD: CPT | Mod: 26,GC

## 2025-04-10 RX ORDER — ACETAMINOPHEN 500 MG/5ML
1000 LIQUID (ML) ORAL ONCE
Refills: 0 | Status: COMPLETED | OUTPATIENT
Start: 2025-04-10 | End: 2025-04-10

## 2025-04-10 RX ADMIN — Medication 1000 MILLIGRAM(S): at 19:33

## 2025-04-10 RX ADMIN — DIAZEPAM 30 MILLIGRAM(S): 2 TABLET ORAL at 15:18

## 2025-04-10 RX ADMIN — Medication 40 MILLIGRAM(S): at 06:27

## 2025-04-10 RX ADMIN — IPRATROPIUM BROMIDE AND ALBUTEROL SULFATE 3 MILLILITER(S): .5; 2.5 SOLUTION RESPIRATORY (INHALATION) at 15:01

## 2025-04-10 RX ADMIN — IPRATROPIUM BROMIDE AND ALBUTEROL SULFATE 3 MILLILITER(S): .5; 2.5 SOLUTION RESPIRATORY (INHALATION) at 08:30

## 2025-04-10 RX ADMIN — Medication 15 MILLILITER(S): at 06:26

## 2025-04-10 RX ADMIN — PROPOFOL 26.6 MICROGRAM(S)/KG/MIN: 10 INJECTION, EMULSION INTRAVENOUS at 09:02

## 2025-04-10 RX ADMIN — Medication 1 APPLICATION(S): at 17:11

## 2025-04-10 RX ADMIN — PROPOFOL 26.6 MICROGRAM(S)/KG/MIN: 10 INJECTION, EMULSION INTRAVENOUS at 19:43

## 2025-04-10 RX ADMIN — Medication 400 MILLIGRAM(S): at 18:51

## 2025-04-10 RX ADMIN — IPRATROPIUM BROMIDE AND ALBUTEROL SULFATE 3 MILLILITER(S): .5; 2.5 SOLUTION RESPIRATORY (INHALATION) at 02:17

## 2025-04-10 RX ADMIN — FOLIC ACID 1 MILLIGRAM(S): 1 TABLET ORAL at 13:02

## 2025-04-10 RX ADMIN — DIAZEPAM 30 MILLIGRAM(S): 2 TABLET ORAL at 22:26

## 2025-04-10 RX ADMIN — Medication 650 MILLIGRAM(S): at 22:25

## 2025-04-10 RX ADMIN — Medication 1 APPLICATION(S): at 06:10

## 2025-04-10 RX ADMIN — PROPOFOL 26.6 MICROGRAM(S)/KG/MIN: 10 INJECTION, EMULSION INTRAVENOUS at 06:25

## 2025-04-10 RX ADMIN — Medication 650 MILLIGRAM(S): at 15:18

## 2025-04-10 RX ADMIN — Medication 15 MILLILITER(S): at 17:11

## 2025-04-10 RX ADMIN — LEVETIRACETAM 500 MILLIGRAM(S): 10 INJECTION, SOLUTION INTRAVENOUS at 06:27

## 2025-04-10 RX ADMIN — DIAZEPAM 30 MILLIGRAM(S): 2 TABLET ORAL at 06:27

## 2025-04-10 RX ADMIN — Medication 1 APPLICATION(S): at 13:02

## 2025-04-10 RX ADMIN — Medication 650 MILLIGRAM(S): at 06:27

## 2025-04-10 RX ADMIN — Medication 40 MILLIGRAM(S): at 17:11

## 2025-04-10 RX ADMIN — LEVETIRACETAM 500 MILLIGRAM(S): 10 INJECTION, SOLUTION INTRAVENOUS at 17:11

## 2025-04-10 RX ADMIN — DOXAZOSIN MESYLATE 1 MILLIGRAM(S): 8 TABLET ORAL at 22:25

## 2025-04-10 NOTE — PROGRESS NOTE ADULT - PROBLEM SELECTOR PLAN 1
Pt w/ oliguric MATA iso hemorrhagic shock and PEA arrest. Likely ATN. UA (3/16) w/ proteinuria and hematuria (21 RBC). Renal US (3/20) w/o hydronephrosis, and w/ large left renal subcapsular hematoma. CXR (3/22) w/ b/l hazy opacities. MRI w/ global hypoxic injury.   -NTDC placed and underwent HD on 3/24 iso worsening oliguric renal failure, w/ associated hyperkalemia and hypervolemia. Pt was anuric despite prior diuretic challenge. IJ NTDC removed 3/31 after HD on 3/29 as pt was non-oliguric and Cr plateaued to ~5.1-5.4. However, labs on 4/3 revealed elevated  and acidotic SCO2 14. Since pt planned for trach/PEG on 4/4, HD was done on 4/3 to optimize him for trach/peg (4/4) via new right femoral NTDC placed on 4/2, now removed. Last HD 4/8, tolerated 1.5L UF.      -Pt oliguric, UOP 479cc/24hr. Renal labs acceptable. No urgency to dialyze. Currently pt doesn't have any HD access. GOC ongoing. Will follow care       Eldon Hamilton  Nephrology Fellow  Feel free to contact me on TEAMS  After 5 pm and on weekends please contact the on-call Fellow.

## 2025-04-10 NOTE — CHART NOTE - NSCHARTNOTEFT_GEN_A_CORE
: Dr. Magdaleno  INDICATION: AHRF    PROCEDURE:  [X] LIMITED ECHO  [X] LIMITED CHEST  [ ] LIMITED RETROPERITONEAL  [ ] LIMITED ABDOMINAL  [ ] LIMITED DVT  [ ] NEEDLE GUIDANCE VASCULAR  [ ] NEEDLE GUIDANCE THORACENTESIS  [ ] NEEDLE GUIDANCE PARACENTESIS  [ ] NEEDLE GUIDANCE PERICARDIOCENTESIS  [ ] OTHER    FINDINGS/INTERPRETATION:  A line predominant bilaterally  Normal LV systolic function   IVC 1.5cm    Images in [facility] : Dr. Magdaleno  INDICATION: AHRF    PROCEDURE:  [X] LIMITED ECHO  [X] LIMITED CHEST  [ ] LIMITED RETROPERITONEAL  [ ] LIMITED ABDOMINAL  [ ] LIMITED DVT  [ ] NEEDLE GUIDANCE VASCULAR  [ ] NEEDLE GUIDANCE THORACENTESIS  [ ] NEEDLE GUIDANCE PARACENTESIS  [ ] NEEDLE GUIDANCE PERICARDIOCENTESIS  [ ] OTHER    FINDINGS/INTERPRETATION:  A line predominant bilaterally  Normal LV systolic function   IVC 1.5cm    Images in [employer]    I have assisted and supervised entire procedure.     Lucien Aguero MD

## 2025-04-10 NOTE — PROGRESS NOTE ADULT - SUBJECTIVE AND OBJECTIVE BOX
INTERVAL HPI/OVERNIGHT EVENTS: Hgb of 5.3 and patient received 1 U of pRBC. No other events OVN.    SUBJECTIVE: AAOx0 and ROS could not be elicited.    VITAL SIGNS:  ICU Vital Signs Last 24 Hrs  T(C): 37.2 (10 Apr 2025 05:09), Max: 38 (09 Apr 2025 18:00)  T(F): 99 (10 Apr 2025 05:09), Max: 100.4 (09 Apr 2025 18:00)  HR: 107 (10 Apr 2025 07:00) (107 - 122)  BP: 111/73 (10 Apr 2025 07:00) (103/63 - 129/80)  BP(mean): 85 (10 Apr 2025 07:00) (75 - 93)  ABP: --  ABP(mean): --  RR: 17 (10 Apr 2025 07:00) (14 - 25)  SpO2: 100% (10 Apr 2025 07:00) (96% - 100%)    O2 Parameters below as of 10 Apr 2025 07:00  Patient On (Oxygen Delivery Method): ventilator, CPAP          Mode: CPAP with PS, FiO2: 50, PEEP: 6, PS: 15, MAP: 12, PIP: 21  Plateau pressure:   P/F ratio:     04-09 @ 07:01  -  04-10 @ 07:00  --------------------------------------------------------  IN: 652 mL / OUT: 1154 mL / NET: -502 mL      CAPILLARY BLOOD GLUCOSE      POCT Blood Glucose.: 131 mg/dL (10 Apr 2025 06:52)      ECG: reviewed.    PHYSICAL EXAM:  General: NAD, normal habitus, mild jaundice   Neuro: AAOx0, sedated & non responsive to noxious stimuli, closed eyes, no verbal sounds, 4 mm pupils, sluggishly reactive pupils b/l   HEENT: icteric sclerae b/l, tracheostomy clean/dry/intact  Chest: non-tender to palpation  Heart: S1/S2, RRR   Lungs: Diffuse coarse breath sounds  Abd: soft, non-tender, but slightly distended.  Ext: Generalized trace pitting edema, LUE wrapped, nodule on R 1st digit proximal phalanges   Pulses: radial 2+ b/l, dorsalis pedis 2+ b/l   Lines/tubes/drains: NGT, Dave, & Flexiseal  Psych: unable to assess       MEDICATIONS:  MEDICATIONS  (STANDING):  albuterol/ipratropium for Nebulization 3 milliLiter(s) Nebulizer every 6 hours  allopurinol 50 milliGRAM(s) Oral <User Schedule>  chlorhexidine 0.12% Liquid 15 milliLiter(s) Oral Mucosa every 12 hours  chlorhexidine 2% Cloths 1 Application(s) Topical daily  diazepam    Tablet 30 milliGRAM(s) Oral three times a day  doxazosin 1 milliGRAM(s) Oral at bedtime  folic acid 1 milliGRAM(s) Oral daily  influenza   Vaccine 0.5 milliLiter(s) IntraMuscular once  levETIRAcetam  Solution 500 milliGRAM(s) Oral two times a day  midodrine 10 milliGRAM(s) Oral every 8 hours  pantoprazole  Injectable 40 milliGRAM(s) IV Push every 12 hours  petrolatum Ophthalmic Ointment 1 Application(s) Both EYES two times a day  propofol Infusion 50 MICROgram(s)/kG/Min (26.6 mL/Hr) IV Continuous <Continuous>  sodium bicarbonate 650 milliGRAM(s) Oral three times a day    MEDICATIONS  (PRN):      ALLERGIES:  Allergies    penicillin (Pruritus)  hydroxyurea (Other)  piperacillin-tazobactam (Urticaria)  ceftriaxone (Anaphylaxis)    Intolerances        LABS:                        5.3    19.60 )-----------( 271      ( 10 Apr 2025 01:29 )             16.0     04-10    133[L]  |  97[L]  |  94[H]  ----------------------------<  103[H]  3.9   |  21[L]  |  3.50[H]    Ca    9.1      10 Apr 2025 01:29  Phos  6.0     04-10  Mg     2.00     04-10    TPro  6.3  /  Alb  2.4[L]  /  TBili  10.2[H]  /  DBili  8.8[H]  /  AST  109[H]  /  ALT  24  /  AlkPhos  187[H]  04-10    PT/INR - ( 10 Apr 2025 01:29 )   PT: 12.3 sec;   INR: 1.03 ratio         PTT - ( 10 Apr 2025 01:29 )  PTT:26.6 sec  Urinalysis Basic - ( 10 Apr 2025 01:29 )    Color: x / Appearance: x / SG: x / pH: x  Gluc: 103 mg/dL / Ketone: x  / Bili: x / Urobili: x   Blood: x / Protein: x / Nitrite: x   Leuk Esterase: x / RBC: x / WBC x   Sq Epi: x / Non Sq Epi: x / Bacteria: x      ABG:      vBG:  pH, Venous: 7.39 (04-10-25 @ 01:29)  pCO2, Venous: 39 mmHg (04-10-25 @ 01:29)  pO2, Venous: 74 mmHg (04-10-25 @ 01:29)  HCO3, Venous: 24 mmol/L (04-10-25 @ 01:29)    Micro:    Culture - Blood (collected 04-08-25 @ 05:25)  Source: Blood Blood-Peripheral  Preliminary Report (04-09-25 @ 11:02):    No growth at 24 hours    Culture - Blood (collected 03-20-25 @ 10:04)  Source: Blood Blood-Peripheral  Final Report (03-25-25 @ 13:01):    No growth at 5 days        Culture - Sputum (collected 04-08-25 @ 08:00)  Source: ET Tube ET Tube  Gram Stain (04-08-25 @ 19:33):    Moderate Squamous epithelial cells per low power field    Few polymorphonuclear leukocytes per low power field    Few Yeast like cells per oil power field  Preliminary Report (04-09-25 @ 08:49):    Commensal lay consistent with body site    Culture - Sputum (collected 03-26-25 @ 11:45)  Source: ET Tube ET Tube  Gram Stain (03-26-25 @ 22:28):    Few Squamous epithelial cells per low power field    Moderate polymorphonuclear leukocytes per low power field    Few Yeast per oil power field  Final Report (03-28-25 @ 08:15):    Commensal lay consistent with body site    Culture - Sputum (collected 03-22-25 @ 17:15)  Source: ET Tube ET Tube  Gram Stain (03-22-25 @ 23:42):    Numerous polymorphonuclear leukocytes per low power field    Rare Squamous epithelial cells per low power field    No organisms seen per oil power field  Final Report (03-24-25 @ 06:52):    Commensal lay consistent with body site        RADIOLOGY & ADDITIONAL TESTS: Reviewed.

## 2025-04-10 NOTE — PROGRESS NOTE ADULT - ATTENDING COMMENTS
MATA  ATN with Oliguria    Will assess goals of care- dialysis held off today  Willl reassess labs and volume status in AM  Monitor fo rnow

## 2025-04-10 NOTE — PROGRESS NOTE ADULT - SUBJECTIVE AND OBJECTIVE BOX
Note picked up by patients son for pt.    Interval Events:   GI was re-consulted for melena and anemia requiring transfusion  d/w primary team. black stool has been noted via F[facility] for the past 4-5 days   vitals have remained stable without vasopressor requirement  hgb drifting to 5s s/p pRBC transfusion on 4/7, 4/8/ & 4/9 to maintain hgb > 6 g/dL given sickle cell disease  complex and ongoing GOC are noted in the setting of anoxic brain injury    Hospital Medications:  albuterol/ipratropium for Nebulization 3 milliLiter(s) Nebulizer every 6 hours  allopurinol 50 milliGRAM(s) Oral <User Schedule>  chlorhexidine 0.12% Liquid 15 milliLiter(s) Oral Mucosa every 12 hours  chlorhexidine 2% Cloths 1 Application(s) Topical daily  diazepam    Tablet 30 milliGRAM(s) Oral three times a day  doxazosin 1 milliGRAM(s) Oral at bedtime  folic acid 1 milliGRAM(s) Oral daily  influenza   Vaccine 0.5 milliLiter(s) IntraMuscular once  levETIRAcetam  Solution 500 milliGRAM(s) Oral two times a day  midodrine 10 milliGRAM(s) Oral every 8 hours  pantoprazole  Injectable 40 milliGRAM(s) IV Push every 12 hours  petrolatum Ophthalmic Ointment 1 Application(s) Both EYES two times a day  propofol Infusion 50 MICROgram(s)/kG/Min IV Continuous <Continuous>  sodium bicarbonate 650 milliGRAM(s) Oral three times a day      ROS: See above.    PHYSICAL EXAM:   Vital Signs:  Vital Signs Last 24 Hrs  T(C): 36.8 (10 Apr 2025 08:00), Max: 38 (09 Apr 2025 18:00)  T(F): 98.2 (10 Apr 2025 08:00), Max: 100.4 (09 Apr 2025 18:00)  HR: 113 (10 Apr 2025 10:26) (105 - 122)  BP: 121/82 (10 Apr 2025 10:00) (110/62 - 129/80)  BP(mean): 94 (10 Apr 2025 10:00) (76 - 94)  RR: 17 (10 Apr 2025 10:00) (14 - 24)  SpO2: 98% (10 Apr 2025 10:26) (97% - 100%)    Parameters below as of 10 Apr 2025 10:00  Patient On (Oxygen Delivery Method): BiPAP/CPAP    GENERAL:  critically ill appearing  HEENT:  sclera icteric  RESPIRATORY:  MV via trach  CARDIAC:  HDS  ABDOMEN:  Soft, non-tender, non-distended  SKIN:  Warm & Dry  NEURO:  nonverbal    LABS:                        5.3    19.60 )-----------( 271      ( 10 Apr 2025 01:29 )             16.0     Mean Cell Volume: 79.7 fL (04-10-25 @ 01:29)    04-10    133[L]  |  97[L]  |  94[H]  ----------------------------<  103[H]  3.9   |  21[L]  |  3.50[H]    Ca    9.1      10 Apr 2025 01:29  Phos  6.0     04-10  Mg     2.00     04-10    TPro  6.3  /  Alb  2.4[L]  /  TBili  10.2[H]  /  DBili  8.8[H]  /  AST  109[H]  /  ALT  24  /  AlkPhos  187[H]  04-10    LIVER FUNCTIONS - ( 10 Apr 2025 01:29 )  Alb: 2.4 g/dL / Pro: 6.3 g/dL / ALK PHOS: 187 U/L / ALT: 24 U/L / AST: 109 U/L / GGT: x           PT/INR - ( 10 Apr 2025 01:29 )   PT: 12.3 sec;   INR: 1.03 ratio         PTT - ( 10 Apr 2025 01:29 )  PTT:26.6 sec

## 2025-04-10 NOTE — PROGRESS NOTE ADULT - ASSESSMENT
· Assessment	  Assessment and Plan: 	  Patient is 44y Male with PMHx of sickle cell disease admitted for anemia concerning for sickle cell crisis s/p ureteroscopy w/ L kidney biopsy 3/19, post-operative course c/b acute hemorrhagic shock, PEA arrest with ROSC; currently with acute renal failure on HD and global anoxic brain injury and urothelial cancer; s/p tracheostomy and pending GOC discussions to decide on possible interventions and dispo.        NEURO:  #Global anoxic brain injury   AAOx0. On sedation with no further myoclonus. Guarded prognosis.   - Wean propofol as tolerated & c/w valium 30 mg TID.  - D/marjan fenatnyl   - S/p versed gtt, weaned off 3/23  - Repeat CT B 3/24 showing diffuse sulcal effacement with increased intracranial pressure, and few patchy foci of cortical blurring   - 3/25 MRIB with diffuse global abnormal supratentorial cortical restricted diffusion consistent with global hypoxic injury     #Seizures  No further myoclonus of upper body after restarting propofol.   - Witness myoclonic jerking concerning for seizures iso anoxic brain injury  - s/p 2.5g keppra load & midazolam 2mg IV q8 PRN   - vEEG report: "Abundant myoclonic seizures in the setting of a severe diffuse/multifocal cerebral dysfunction which can be seen in the setting of sedative effect or due to an anoxic injury, with improvement after 20:50 suggesting a lowering of sedation"; will f/u with neuro recommendations. Repeat vEEG 3/24 showing severe cerebral dysfunction   -C/w levetiracetam 500 mg po BID & Diazepam 30 mg po TID. Attempt to wean off propofol completely      CV:  #Shock - hemorrhagic shock s/p kidney biopsy s/p 5u pRBC, 3 plasma, 3 plt  last dose of lovenox AC on 3/18 at 5PM   Hgb 8 -> ~3.  Current Hg = 5-6  - Not on levophed, MAP goal>65  - Monitor cbc q24  - Midodrine 10mg TID & trend CBC daily for Hg goal   - Hg goal >6 per heme/onc    #Hx of VTE (R IJ thrombus, L brachial DVT)  - CTM, hold AC given hemorrhage  - Bullae present on arm with DVT  - Appreciate orthopedics hand surgery consult, not compartment syndrome     PULM:  #Acute hypoxic respiratory failure  #Pna- 2/2 ventilation requirement  #Tracheostomy   CXR with R lung field and left mid and lower lung field hazy opacities.  - Wean vent as tolerated  - continue with duonebs Q6H & s/p HTS  - 3/28 CXR showing increasing RUL consolidation   -4/4 bedside tracheostomy   -S/p Abx completion course as below and currently on meropenem 500 mg Q24H (04/02 - 04/07)  -C/w trach care    RENAL:  #Acute renal failure.   #Renal mass s/p biopsy -   #Acidosis  Oliguric & receiving Bumex IVP PRN per nephro recs  Acute renal failure s/p cardiac arrest most likely 2/2 ATN requiring intermittent HD with nephro following  - TOV 3/28, failed; replaced daniel on 4/1; Attempt TOV when possible  - C/w Doxazosin  - Trend Cr, UOP, lytes  - S/p bicarb drip completion  - C/w sodium bicarbonate 650 mg po TID   - 3/31 removed L IJV shiley; 4/2 placed R fem shiley that was removed on 04/08  - Per pathology result of renal biopsy, noninvasive urothelial malignancy   - Per nephro, HD session #1 3/24, HD sessions #2 3/25, HD session #3 3/27; planning HD #4 3/29; 4/3 HD #5; HD sessions per nephro. Removed R. femoral line (04/08) & patient will need HD access pending C discussion & daily labs    GI:  #Shock liver  Unchanged  LFTs. Jaundice with hyperbilirubinemia (direct).   - Trend liver enzymes  - RUQ US showing enlarged periportal and periaortic enlarged lymph nodes and enlarged left lateral liver lobe and CBD 9 mm.     #Occult blood in feces  + Occult blood with dropping Hgb daily below goal of 6 ( Hx of SS). Patient with melena during hospital stay requiring pRBC to meet goal and with inappropriate Hgb response.  S/p 1 uPRBC 4/1, 1 uPRBC 4/2, 1 uPRBC 4/5, uPRBC 4/7, uPRBC 4/8, & uPRBC 4/9  Hold off on GI consult  Maintain active T & S  C/w PPI 40 mg IV BID for now.    #Diet:  - NPO except meds   - GI unable to place PEG due to hepatomegaly.   - Per IR consult: Given no safe window for endoscopic placement, recommend surgical consultation.   - Surgery consulted & following. Pending further GOCs discussion.      HEMATOLOGIC/ONCOLGOGIC   #Sickle cell crisis--Resolved  #Acute blood loss anemia- 2/2 sickle cells and uremia vs critical illness vs GIB  Downtrending direct hyperbilirubinemia,  gradually & Increasing reticulocyte count.   - Heme following & recs appreciated  -D/c deferasirox due to current renal failure   -S/p 7 U of pRBC since beginning of April  - Occult blood positive. C/w PPI 40 mg IV BID for now.  - Transfuse as needed Hb >6 goal per Heme.    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 CXR showing increased RUL opacification.   - D/c  aztreonam 2000 mg IV qd ( started 3/22- 3/27)   - S/p vancomycin 1500 mg IV qd (4/2); 2nd dose 4/4, 3rd dose 4/5  - 4/2- CXR showing increasing RUL consolidation  - Completed 5d meropenem 500 mg IV qd (started 3/27-3/31); (4/2-4/7). Hold off Meropenem after 5 day course     MSK:  #infiltration  Infiltration of sodium bicarb into left arm, now with arm distension and bullae. Elevated uric acid with nodule suggestive of podagra however no signs of active gout flare.   -Appreciate orthopedic hand surgery, not compartment syndrome   - CTM    Ethics: Full Code   Pending: Discussion with family   Palliative Team: Consulted Assessment and Plan: 	  Patient is 44y Male with PMHx of sickle cell disease admitted for anemia concerning for sickle cell crisis s/p ureteroscopy w/ L kidney biopsy 3/19, post-operative course c/b acute hemorrhagic shock, PEA arrest with ROSC; currently with acute renal failure on HD and global anoxic brain injury and urothelial cancer; s/p tracheostomy and pending GOC discussions to decide on possible interventions and dispo. GI consulted for melena & downtrending hemoglobin.         NEURO:  #Global anoxic brain injury   AAOx0. On sedation with no further myoclonus. Guarded prognosis.   - C/w valium 30 mg TID & propofol completely weaned off  - D/marjan fenatnyl   - S/p versed gtt, weaned off 3/23  - Repeat CT B 3/24 showing diffuse sulcal effacement with increased intracranial pressure, and few patchy foci of cortical blurring   - 3/25 MRIB with diffuse global abnormal supratentorial cortical restricted diffusion consistent with global hypoxic injury     #Seizures  No further myoclonus of upper body after restarting propofol.   - Witness myoclonic jerking concerning for seizures iso anoxic brain injury  - s/p 2.5g keppra load & midazolam 2mg IV q8 PRN   - vEEG report: "Abundant myoclonic seizures in the setting of a severe diffuse/multifocal cerebral dysfunction which can be seen in the setting of sedative effect or due to an anoxic injury, with improvement after 20:50 suggesting a lowering of sedation"; will f/u with neuro recommendations. Repeat vEEG 3/24 showing severe cerebral dysfunction   -C/w levetiracetam 500 mg po BID & Diazepam 30 mg po TID. Propofol weaned off completely.      CV:  #Shock - hemorrhagic shock s/p kidney biopsy s/p 5u pRBC, 3 plasma, 3 plt  last dose of lovenox AC on 3/18 at 5PM   Hgb 8 -> ~3.  Current Hg = 5-6  - Not on levophed, MAP goal>65  - Monitor cbc q24  - Midodrine 10mg TID & trend CBC daily for Hg goal   - Hg goal >6 per heme/onc  - GI consulted given downtrending Hgb, melanotic stool, and positive stool occult blood feces.      #Hx of VTE (R IJ thrombus, L brachial DVT)  - CTM, hold AC given hemorrhage  - Bullae present on arm with DVT  - Appreciate orthopedics hand surgery consult, not compartment syndrome     PULM:  #Acute hypoxic respiratory failure  #Pna- 2/2 ventilation requirement  #Tracheostomy   CXR with R lung field and left mid and lower lung field hazy opacities.  - continue with duonebs Q6H & s/p HTS  - 3/28 CXR showing increasing RUL consolidation   -4/4 bedside tracheostomy   -S/p Abx completion course as below and currently on meropenem 500 mg Q24H (04/02 - 04/07)  -C/w trach care & wean vent settings as tolerated    RENAL:  #Acute renal failure.   #Renal mass s/p biopsy -   #Acidosis  Oliguric & receiving Bumex IVP PRN per nephro recs  Acute renal failure s/p cardiac arrest most likely 2/2 ATN requiring intermittent HD with nephro following  - TOV 3/28, failed; replaced adniel on 4/1; Attempt TOV when possible  - C/w Doxazosin  - Trend Cr, UOP, lytes  - S/p bicarb drip completion  - C/w sodium bicarbonate 650 mg po TID   - 3/31 removed L IJV shiley; 4/2 placed R fem shiley that was removed on 04/08  - Per pathology result of renal biopsy, noninvasive urothelial malignancy   - HD sessions per nephro. Removed R. femoral line (04/08) & patient will need HD access pending GOC discussion & daily labs.     GI:  #Shock liver  Unchanged  LFTs. Jaundice with hyperbilirubinemia (direct).   - Trend liver enzymes  - RUQ US showing enlarged periportal and periaortic enlarged lymph nodes and enlarged left lateral liver lobe and CBD 9 mm.     #Occult blood in feces  Positive Occult blood with dropping Hgb daily below goal of 6 ( Hx of SS). Patient with melena during hospital stay requiring pRBC to meet goal and with inappropriate Hgb response.  S/p 7 U of pRBCs since the beginning of April given downtrending Hgb  Maintain active T & S  C/w PPI 40 mg IV BID for now.  GI consulted given downtrending Hgb, melanotic stool, and positive stool occult blood feces.      #Diet:  - NPO except meds   - GI unable to place PEG due to hepatomegaly.   - Per IR consult: Given no safe window for endoscopic placement, recommend surgical consultation.   - Surgery consulted  - Keep NPO for now pending GI intervention for melena.       HEMATOLOGIC/ONCOLGOGIC   #Sickle cell crisis--Resolved  #Acute blood loss anemia- 2/2 sickle cells and uremia vs critical illness vs GIB  Downtrending direct hyperbilirubinemia,  gradually & Increasing reticulocyte count.   - Heme following & recs appreciated  - D/c deferasirox due to current renal failure   - S/p 7 U of pRBC since beginning of April  - Occult blood positive. C/w PPI 40 mg IV BID for now.  - Transfuse as needed Hb >6 goal per Heme.  - GI consulted given downtrending Hgb, melanotic stool, and positive stool occult blood feces.      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 CXR showing increased RUL opacification.   - D/c  aztreonam 2000 mg IV qd ( started 3/22- 3/27)   - S/p vancomycin 1500 mg IV qd (4/2); 2nd dose 4/4, 3rd dose 4/5  - 4/2- CXR showing increasing RUL consolidation  - Completed 5d meropenem 500 mg IV qd (started 3/27-3/31); (4/2-4/7). Hold off Meropenem after 5 day course   - CTM resp status    MSK:  #infiltration  Infiltration of sodium bicarb into left arm, now with arm distension and bullae. Elevated uric acid with nodule suggestive of podagra however no signs of active gout flare.   -Appreciate orthopedic hand surgery, not compartment syndrome   - CTM    Ethics: Full Code   Pending: Discussion with family   Palliative Team: Consulted

## 2025-04-10 NOTE — PROGRESS NOTE ADULT - SUBJECTIVE AND OBJECTIVE BOX
Interval Events:  No acute events. Trach site clean and dry    REVIEW OF SYSTEMS:  Negative except as documented above.      OBJECTIVE:  ICU Vital Signs Last 24 Hrs  T(C): 38.1 (10 Apr 2025 20:00), Max: 38.3 (10 Apr 2025 19:00)  T(F): 100.6 (10 Apr 2025 20:00), Max: 100.9 (10 Apr 2025 19:00)  HR: 126 (10 Apr 2025 20:00) (105 - 133)  BP: 115/66 (10 Apr 2025 20:00) (110/67 - 129/80)  BP(mean): 81 (10 Apr 2025 20:00) (81 - 94)  RR: 25 (10 Apr 2025 20:00) (14 - 31)  SpO2: 97% (10 Apr 2025 20:00) (93% - 100%)    O2 Parameters below as of 10 Apr 2025 20:00  Patient On (Oxygen Delivery Method): ventilator    O2 Concentration (%): 30      PHYSICAL EXAM:  General: Tracheostomy in place  HEENT: EOMI, sclera icteric  Neck: supple  Cardiovascular: RR  Respiratory: On vent  Abdomen: soft  Extremities: warm and well perfused, bilateral LE edema  Skin: small lesion/healed wound on left ear  Neurological: AAOx0      HOSPITAL MEDICATIONS:  MEDICATIONS  (STANDING):  albuterol/ipratropium for Nebulization 3 milliLiter(s) Nebulizer every 6 hours  allopurinol 50 milliGRAM(s) Oral <User Schedule>  chlorhexidine 0.12% Liquid 15 milliLiter(s) Oral Mucosa every 12 hours  chlorhexidine 2% Cloths 1 Application(s) Topical daily  diazepam    Tablet 30 milliGRAM(s) Oral three times a day  doxazosin 1 milliGRAM(s) Oral at bedtime  folic acid 1 milliGRAM(s) Oral daily  influenza   Vaccine 0.5 milliLiter(s) IntraMuscular once  levETIRAcetam  Solution 500 milliGRAM(s) Oral two times a day  midodrine 10 milliGRAM(s) Oral every 8 hours  pantoprazole  Injectable 40 milliGRAM(s) IV Push every 12 hours  petrolatum Ophthalmic Ointment 1 Application(s) Both EYES two times a day  propofol Infusion 50 MICROgram(s)/kG/Min (26.6 mL/Hr) IV Continuous <Continuous>  sodium bicarbonate 650 milliGRAM(s) Oral three times a day      LABS:                                   5.3    19.60 )-----------( 271      ( 10 Apr 2025 01:29 )             16.0        04-10    133[L]  |  97[L]  |  94[H]  ----------------------------<  103[H]  3.9   |  21[L]  |  3.50[H]    Ca    9.1      10 Apr 2025 01:29  Phos  6.0     04-10  Mg     2.00     04-10    TPro  6.3  /  Alb  2.4[L]  /  TBili  10.2[H]  /  DBili  8.8[H]  /  AST  109[H]  /  ALT  24  /  AlkPhos  187[H]  04-10    RADIOLOGY:  [x] Reviewed and interpreted by me

## 2025-04-10 NOTE — PROGRESS NOTE ADULT - ATTENDING COMMENTS
1. Acute hypoxemic respiratory failure s/p cardiac arrest. Pt now with tracheostomy tube. Tolerating AC vent setting. PS trial today as tolerated. .  2. Neuro: Anoxic brain injury. Pt with myoclonic seizures. Continue Keppra. Increase Valium to 30mg tid. . Shut off propofol.   3. Surgical input for PEG/PEJ appreciated.   4.ID. Pt started on meropenem for fevers.  Finished 5 day course of meropenem.  5.HEME. H/O ss anemia. Keep HB>6. Transfused 1 unit PRBC yesterday and today.   6. Renal: Acute renal failure s/p cardiac arrest. Likely ATN. Intermittent HD .  7. Renal bx with urothelial cancer.  8.DVT prophylaxis: SCD  9. GOC: Full code . Discussed with family that pt will never be independent and will live in nursing home for the rest of his life. One family member said he had discussed possibility and being very sick. "He would never want  to be a vegetable."   Trying to assemble a  family meeting or call.

## 2025-04-10 NOTE — PROGRESS NOTE ADULT - ATTENDING COMMENTS
As above.    Patient seen and examined in the early evening of 4/10/2025  Discussed with GI fellow earlier in the day of 4/10/25    Impression:    #1.  Consulted for melena x 5 days, with acute blood loss anemia requiring approximately 1 unit of packed red cells per day since melena started.      #2.  Sickle cell disease    #3.  Renal cell cancer, status post biopsy resulting in bleeding and hemorrhagic shock, PEA arrest, anoxic brain injury.    #4.  Acute kidney injury, on hemodialysis    #5.  Respiratory failure, status post tracheostomy, does not have PEG    #6.  Worsening fever curve.  Tmax 100.9 F recently.  Unknown cause.  Treated empirically with meropenem, without much improvement.    Recommendations:    #1.  Follow CBC, transfuse as necessary    #2.  Continue pantoprazole 40 mg IV twice daily.    #3.  Family meeting scheduled 4/11/25 to discuss goals of care and treatment plan.      #4.  Given family meeting, the possibility that consensus will not be reached, and critical illness of the patient, including unexplained fevers, GI team will obtain informed consent from the health care proxy.  It is possible that the meeting will not happen on 4/11 or be scheduled late in the day, and therefore the EGD/PEG would be postponed.

## 2025-04-10 NOTE — PROGRESS NOTE ADULT - ASSESSMENT
Mr. Downey is 44 year old male with sickle cell disease admitted for sickle cell crisis s/p ureteroscopy w/ L kidney biopsy 3/19 confirming urothelial cancer, post-operative course c/b hemorrhagic shock, PEA arrest with ROSC c/b global anoxic brain injury, acute renal failure requiring HD and respiratory failure s/p tracheostomy. GI consulted for melena & anemia.     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 He remains hemodynamically stable and not requiring vasopressors. Coags and PLT count are normal, however likely has platelet dysfunction secondary to severe uremia related to renal failure. Complex and ongoing goals are care are noted given guarded prognosis. Per discussion with MICU team, global goals of care to be addressed during family meeting on 4/11.     Recommendations:  - GOC discussion per MICU team and Palliative Care on 4/11  - If goals are to continue all medical interventions including invasive measures, will offer bedside EGD to assess and potentially treat upper GI bleeding on 4/11  - Continue medical therapy with IV PPI BID   - Closely monitor vital signs and for clinical signs of bleeding  - Track all stool output and color  - Trend CBC & transfuse to maintain hgb > 6 g/dL per Heme/Onc given sickle cell disease    Brandon Nguyen MD  Gastroenterology/Hepatology Fellow  Available via [employer] Teams  Pager: [phone]    On Weekdays after 5 PM to 8AM and Weekends/Holidays (All day)  For non-urgent consults, please email [email or web address] or [email or web address]  For urgent consults, please contact on call GI team.  See Amion schedule ([facility]), [employer] system - [phone] ([facility]), or call hospital  ([facility])

## 2025-04-10 NOTE — PROGRESS NOTE ADULT - ASSESSMENT
44 year old male with history of sickle cell disease, UE DVT, gout, HTN, with L renal mass admitted with acute anemia/ sickle cell crisis and evaluation of his left renal mass s/p left ureteroscopy and biopsy (3/19/25) with post operative course complicated by rapid responses for hypoglycemia c/b cardiac arrest x 4 minutes CPR/1 epi with ROSC, transferred to MICU for further care, found to have anoxic brain injury. Interventional pulmonology consulted for evaluation for tracheostomy.    Intubated 3/20/25 for cardiac arrest. Bedside POCUS evaluation revealed 1st tracheal ring with overlying small vessel which is easily compressible and non-pulsatile and without overlying innominate vessel.    Discussed tracheostomy with brother/HCP Bala, risks and benefits explained and written consent obtained.    4/4/25 s/p percutaneous tracheostomy 7 portex placed today with minimal bleeding. Tolerated procedure without issue  POD 6   Post-procedure CXR confirmed tracheostomy tube in appropriate position     Recommendations:  - monitor for bleeding/oozing  - keep back-up tracheostomy in the room at all times  - minimize tension on tracheostomy  - c/w in-line suctioning   - sutures to remain in place for 10d (will remove 4/14)   - Ongoing goals of care discussion  - IP team to follow    Discussed with Dr. Webb and MICU team

## 2025-04-10 NOTE — PROGRESS NOTE ADULT - SUBJECTIVE AND OBJECTIVE BOX
[facility] DIVISION OF KIDNEY DISEASES AND HYPERTENSION   FOLLOW UP NOTE    --------------------------------------------------------------------------------  Chief Complaint:    24 hour events/subjective: Pt. was seen and examined today in MICU. Remains sedated.       PAST HISTORY  --------------------------------------------------------------------------------  No significant changes to PMH, PSH, FHx, SHx, unless otherwise noted    ALLERGIES & MEDICATIONS  --------------------------------------------------------------------------------  Allergies    penicillin (Pruritus)  hydroxyurea (Other)  piperacillin-tazobactam (Urticaria)  ceftriaxone (Anaphylaxis)    Intolerances      Standing Inpatient Medications  albuterol/ipratropium for Nebulization 3 milliLiter(s) Nebulizer every 6 hours  allopurinol 50 milliGRAM(s) Oral <User Schedule>  chlorhexidine 0.12% Liquid 15 milliLiter(s) Oral Mucosa every 12 hours  chlorhexidine 2% Cloths 1 Application(s) Topical daily  diazepam    Tablet 30 milliGRAM(s) Oral three times a day  doxazosin 1 milliGRAM(s) Oral at bedtime  folic acid 1 milliGRAM(s) Oral daily  influenza   Vaccine 0.5 milliLiter(s) IntraMuscular once  levETIRAcetam  Solution 500 milliGRAM(s) Oral two times a day  midodrine 10 milliGRAM(s) Oral every 8 hours  pantoprazole  Injectable 40 milliGRAM(s) IV Push every 12 hours  petrolatum Ophthalmic Ointment 1 Application(s) Both EYES two times a day  propofol Infusion 50 MICROgram(s)/kG/Min IV Continuous <Continuous>  sodium bicarbonate 650 milliGRAM(s) Oral three times a day    PRN Inpatient Medications      REVIEW OF SYSTEMS  --------------------------------------------------------------------------------  unable to obtain due to clinic status      VITALS/PHYSICAL EXAM  --------------------------------------------------------------------------------  T(C): 36.8 (04-10-25 @ 08:00), Max: 38 (04-09-25 @ 18:00)  HR: 113 (04-10-25 @ 10:26) (105 - 122)  BP: 121/82 (04-10-25 @ 10:00) (103/63 - 129/80)  RR: 17 (04-10-25 @ 10:00) (14 - 24)  SpO2: 98% (04-10-25 @ 10:26) (97% - 100%)  Wt(kg): --        04-09-25 @ 07:01  -  04-10-25 @ 07:00  --------------------------------------------------------  IN: 652 mL / OUT: 1154 mL / NET: -502 mL    04-10-25 @ 07:01  -  04-10-25 @ 12:21  --------------------------------------------------------  IN: 15.9 mL / OUT: 90 mL / NET: -74.1 mL        Physical Exam:  	Gen: Ill appearing   	HEENT: Trached  	Pulm: CTA B/L  	CV: S1S2+  	Abd: Distended    	Ext: +edema  	Neuro: Sedated  	Skin: Warm and dry  	Dialysis access: None    LABS/STUDIES  --------------------------------------------------------------------------------              5.3    19.60 >-----------<  271      [04-10-25 @ 01:29]              16.0     133  |  97  |  94  ----------------------------<  103      [04-10-25 @ 01:29]  3.9   |  21  |  3.50        Ca     9.1     [04-10-25 @ 01:29]      iCa    1.23     [04-10 @ 01:29]      Mg     2.00     [04-10-25 @ 01:29]      Phos  6.0     [04-10-25 @ 01:29]    TPro  6.3  /  Alb  2.4  /  TBili  10.2  /  DBili  8.8  /  AST  109  /  ALT  24  /  AlkPhos  187  [04-10-25 @ 01:29]    PT/INR: PT 12.3 , INR 1.03       [04-10-25 @ 01:29]  PTT: 26.6       [04-10-25 @ 01:29]      Creatinine Trend:  SCr 3.50 [04-10 @ 01:29]  SCr 2.76 [04-09 @ 00:55]  SCr 4.61 [04-08 @ 00:33]  SCr 4.22 [04-07 @ 00:24]  SCr 3.88 [04-06 @ 00:15]    Urinalysis - [04-10-25 @ 01:29]      Color  / Appearance  / SG  / pH       Gluc 103 / Ketone   / Bili  / Urobili        Blood  / Protein  / Leuk Est  / Nitrite       RBC  / WBC  / Hyaline  / Gran  / Sq Epi  / Non Sq Epi  / Bacteria       HBsAb 50.9      [03-29-25 @ 07:15]  HBsAg Nonreact      [03-17-25 @ 15:26]  HBcAb Nonreact      [03-29-25 @ 07:15]  HCV 0.10, Nonreact      [03-17-25 @ 15:26]  HIV Nonreact      [03-17-25 @ 15:26]      Tacrolimus  Cyclosporine  Sirolimus  Mycophenolate  BK PCR  CMV PCRCMVPCR Log: NotDetec Pyl59IQ/mL (12-27 @ 05:38)    Parvo PCR  EBV PCR

## 2025-04-10 NOTE — CHART NOTE - NSCHARTNOTEFT_GEN_A_CORE
Spoke to patient's brother (Bala Downey) about GOCs and patient deferred until family meeting (TBD), but wants full code and interventions at this time including enteral tube placement, scope, and TDC/shiley placement. Reached out to GI and patient scheduled for possible scope tomorrow in the afternoon with possible chance of PEG placement (if appropriate window is found during scope). Patient's brother notified of scope & possible PEG placement and amenable to both. Also notified that patient will need HD access and amenable to placement of TDC or shiley. In terms of family meeting, patient states that he can not gather family members for Friday afternoon family meeting/GOC discussion ( "difficult to bring everyone together") and would need more time to do so. D/w Dr. Eve Tavares about conversation with patient and alternatives such as phoning in family during meeting was presented to the patient, but states it may even be difficult to reach family members with phone and can not give us more details on date of family meeting until Friday afternoon (4/11/2025). Further discussions to be had. Patient's brother requested that GI reach out to him to discuss risk/benefits of EGD and/or PEG placement, as well as timing of procedure and GI fellow notified with best contact number to reach patient's brother.      PGY-1, Dawson Bella Spoke to patient's brother (Bala Downey) about GOCs and brother deferred until family meeting (TBD), but wants full code and interventions at this time including enteral tube placement, scope, and TDC/shiley placement. Reached out to GI and patient scheduled for possible scope tomorrow in the afternoon with possible chance of PEG placement (if appropriate window is found during scope). Patient's brother notified of scope & possible PEG placement and amenable to both. Also notified that patient will need HD access and amenable to placement of TDC or shiley. In terms of family meeting, patient states that he can not gather family members for Friday afternoon family meeting/GOC discussion ( "difficult to bring everyone together") and would need more time to do so. D/w Dr. Eve Tavares about conversation with patient and alternatives such as phoning in family during meeting was presented to the patient, but states it may even be difficult to reach family members with phone and can not give us more details on date of family meeting until Friday afternoon (4/11/2025). Further discussions to be had. Patient's brother requested that GI reach out to him to discuss risk/benefits of EGD and/or PEG placement, as well as timing of procedure and GI fellow notified with best contact number to reach patient's brother.      PGY-1, Dawson Bella

## 2025-04-11 LAB
ALBUMIN SERPL ELPH-MCNC: 2.4 G/DL — LOW (ref 3.3–5)
ALP SERPL-CCNC: 190 U/L — HIGH (ref 40–120)
ALT FLD-CCNC: 25 U/L — SIGNIFICANT CHANGE UP (ref 4–41)
ANION GAP SERPL CALC-SCNC: 21 MMOL/L — HIGH (ref 7–14)
ANISOCYTOSIS BLD QL: SLIGHT — SIGNIFICANT CHANGE UP
APTT BLD: 25.8 SEC — SIGNIFICANT CHANGE UP (ref 24.5–35.6)
APTT BLD: 27.9 SEC — SIGNIFICANT CHANGE UP (ref 24.5–35.6)
AST SERPL-CCNC: 128 U/L — HIGH (ref 4–40)
BASOPHILS # BLD AUTO: 0.13 K/UL — SIGNIFICANT CHANGE UP (ref 0–0.2)
BASOPHILS # BLD AUTO: 0.33 K/UL — HIGH (ref 0–0.2)
BASOPHILS NFR BLD AUTO: 0.7 % — SIGNIFICANT CHANGE UP (ref 0–2)
BASOPHILS NFR BLD AUTO: 1.7 % — SIGNIFICANT CHANGE UP (ref 0–2)
BILIRUB DIRECT SERPL-MCNC: 7.7 MG/DL — HIGH (ref 0–0.3)
BILIRUB INDIRECT FLD-MCNC: 1 MG/DL — SIGNIFICANT CHANGE UP (ref 0–1)
BILIRUB SERPL-MCNC: 8.7 MG/DL — HIGH (ref 0.2–1.2)
BILIRUB SERPL-MCNC: 8.7 MG/DL — HIGH (ref 0.2–1.2)
BLD GP AB SCN SERPL QL: NEGATIVE — SIGNIFICANT CHANGE UP
BUN SERPL-MCNC: 105 MG/DL — HIGH (ref 7–23)
BURR CELLS BLD QL SMEAR: PRESENT — SIGNIFICANT CHANGE UP
CA-I BLD-SCNC: 1.12 MMOL/L — LOW (ref 1.15–1.29)
CA-I BLD-SCNC: 1.2 MMOL/L — SIGNIFICANT CHANGE UP (ref 1.15–1.29)
CALCIUM SERPL-MCNC: 9.1 MG/DL — SIGNIFICANT CHANGE UP (ref 8.4–10.5)
CHLORIDE SERPL-SCNC: 101 MMOL/L — SIGNIFICANT CHANGE UP (ref 98–107)
CK SERPL-CCNC: 19 U/L — LOW (ref 30–200)
CO2 SERPL-SCNC: 18 MMOL/L — LOW (ref 22–31)
CREAT SERPL-MCNC: 4.05 MG/DL — HIGH (ref 0.5–1.3)
EGFR: 18 ML/MIN/1.73M2 — LOW
EGFR: 18 ML/MIN/1.73M2 — LOW
EOSINOPHIL # BLD AUTO: 1.06 K/UL — HIGH (ref 0–0.5)
EOSINOPHIL # BLD AUTO: 1.32 K/UL — HIGH (ref 0–0.5)
EOSINOPHIL NFR BLD AUTO: 5.7 % — SIGNIFICANT CHANGE UP (ref 0–6)
EOSINOPHIL NFR BLD AUTO: 6.9 % — HIGH (ref 0–6)
GAS PNL BLDV: SIGNIFICANT CHANGE UP
GAS PNL BLDV: SIGNIFICANT CHANGE UP
GIANT PLATELETS BLD QL SMEAR: PRESENT — SIGNIFICANT CHANGE UP
GLUCOSE BLDC GLUCOMTR-MCNC: 106 MG/DL — HIGH (ref 70–99)
GLUCOSE BLDC GLUCOMTR-MCNC: 108 MG/DL — HIGH (ref 70–99)
GLUCOSE BLDC GLUCOMTR-MCNC: 110 MG/DL — HIGH (ref 70–99)
GLUCOSE BLDC GLUCOMTR-MCNC: 117 MG/DL — HIGH (ref 70–99)
GLUCOSE SERPL-MCNC: 97 MG/DL — SIGNIFICANT CHANGE UP (ref 70–99)
HAPTOGLOB SERPL-MCNC: <20 MG/DL — LOW (ref 34–200)
HCT VFR BLD CALC: 16 % — CRITICAL LOW (ref 39–50)
HCT VFR BLD CALC: 18.1 % — CRITICAL LOW (ref 39–50)
HGB BLD-MCNC: 5.5 G/DL — CRITICAL LOW (ref 13–17)
HGB BLD-MCNC: 6.6 G/DL — CRITICAL LOW (ref 13–17)
IANC: 13.24 K/UL — HIGH (ref 1.8–7.4)
IANC: 13.48 K/UL — HIGH (ref 1.8–7.4)
IMM GRANULOCYTES NFR BLD AUTO: 1.8 % — HIGH (ref 0–0.9)
INR BLD: 1.03 RATIO — SIGNIFICANT CHANGE UP (ref 0.85–1.16)
INR BLD: 1.04 RATIO — SIGNIFICANT CHANGE UP (ref 0.85–1.16)
LDH SERPL L TO P-CCNC: 382 U/L — HIGH (ref 135–225)
LYMPHOCYTES # BLD AUTO: 12 % — LOW (ref 13–44)
LYMPHOCYTES # BLD AUTO: 13.8 % — SIGNIFICANT CHANGE UP (ref 13–44)
LYMPHOCYTES # BLD AUTO: 2.24 K/UL — SIGNIFICANT CHANGE UP (ref 1–3.3)
LYMPHOCYTES # BLD AUTO: 2.65 K/UL — SIGNIFICANT CHANGE UP (ref 1–3.3)
MAGNESIUM SERPL-MCNC: 2 MG/DL — SIGNIFICANT CHANGE UP (ref 1.6–2.6)
MCHC RBC-ENTMCNC: 30.4 PG — SIGNIFICANT CHANGE UP (ref 27–34)
MCHC RBC-ENTMCNC: 30.9 PG — SIGNIFICANT CHANGE UP (ref 27–34)
MCHC RBC-ENTMCNC: 34.4 G/DL — SIGNIFICANT CHANGE UP (ref 32–36)
MCHC RBC-ENTMCNC: 36.5 G/DL — HIGH (ref 32–36)
MCV RBC AUTO: 82.6 FL — SIGNIFICANT CHANGE UP (ref 80–100)
MCV RBC AUTO: 83.4 FL — SIGNIFICANT CHANGE UP (ref 80–100)
MICROCYTES BLD QL: SLIGHT — SIGNIFICANT CHANGE UP
MONOCYTES # BLD AUTO: 1.48 K/UL — HIGH (ref 0–0.9)
MONOCYTES # BLD AUTO: 1.99 K/UL — HIGH (ref 0–0.9)
MONOCYTES NFR BLD AUTO: 10.4 % — SIGNIFICANT CHANGE UP (ref 2–14)
MONOCYTES NFR BLD AUTO: 7.9 % — SIGNIFICANT CHANGE UP (ref 2–14)
NEUTROPHILS # BLD AUTO: 12.89 K/UL — HIGH (ref 1.8–7.4)
NEUTROPHILS # BLD AUTO: 13.48 K/UL — HIGH (ref 1.8–7.4)
NEUTROPHILS NFR BLD AUTO: 67.2 % — SIGNIFICANT CHANGE UP (ref 43–77)
NEUTROPHILS NFR BLD AUTO: 71.9 % — SIGNIFICANT CHANGE UP (ref 43–77)
NRBC # BLD AUTO: 0.15 K/UL — HIGH (ref 0–0)
NRBC # BLD: 3 /100 WBCS — HIGH (ref 0–0)
NRBC # FLD: 0.15 K/UL — HIGH (ref 0–0)
NRBC BLD AUTO-RTO: 0 /100 WBCS — SIGNIFICANT CHANGE UP (ref 0–0)
NRBC BLD-RTO: 3 /100 WBCS — HIGH (ref 0–0)
OVALOCYTES BLD QL SMEAR: SLIGHT — SIGNIFICANT CHANGE UP
PHOSPHATE SERPL-MCNC: 6.9 MG/DL — HIGH (ref 2.5–4.5)
PLAT MORPH BLD: NORMAL — SIGNIFICANT CHANGE UP
PLATELET # BLD AUTO: 233 K/UL — SIGNIFICANT CHANGE UP (ref 150–400)
PLATELET # BLD AUTO: 254 K/UL — SIGNIFICANT CHANGE UP (ref 150–400)
PLATELET COUNT - ESTIMATE: NORMAL — SIGNIFICANT CHANGE UP
POIKILOCYTOSIS BLD QL AUTO: SLIGHT — SIGNIFICANT CHANGE UP
POLYCHROMASIA BLD QL SMEAR: SLIGHT — SIGNIFICANT CHANGE UP
POTASSIUM SERPL-MCNC: 4.3 MMOL/L — SIGNIFICANT CHANGE UP (ref 3.5–5.3)
POTASSIUM SERPL-SCNC: 4.3 MMOL/L — SIGNIFICANT CHANGE UP (ref 3.5–5.3)
PROT SERPL-MCNC: 6.5 G/DL — SIGNIFICANT CHANGE UP (ref 6–8.3)
PROTHROM AB SERPL-ACNC: 12 SEC — SIGNIFICANT CHANGE UP (ref 9.9–13.4)
PROTHROM AB SERPL-ACNC: 12.4 SEC — SIGNIFICANT CHANGE UP (ref 9.9–13.4)
RBC # BLD: 1.78 M/UL — LOW (ref 4.2–5.8)
RBC # BLD: 1.78 M/UL — LOW (ref 4.2–5.8)
RBC # BLD: 2.17 M/UL — LOW (ref 4.2–5.8)
RBC # BLD: 2.17 M/UL — LOW (ref 4.2–5.8)
RBC # FLD: 18.2 % — HIGH (ref 10.3–14.5)
RBC # FLD: 18.5 % — HIGH (ref 10.3–14.5)
RBC BLD AUTO: ABNORMAL
RETICS #: 126.2 K/UL — HIGH (ref 25–125)
RETICS #: 146.7 K/UL — SIGNIFICANT CHANGE UP (ref 25–125)
RETICS/RBC NFR: 6.8 % — SIGNIFICANT CHANGE UP (ref 0.5–2.5)
RETICS/RBC NFR: 7.1 % — HIGH (ref 0.5–2.5)
RH IG SCN BLD-IMP: POSITIVE — SIGNIFICANT CHANGE UP
SCHISTOCYTES BLD QL AUTO: SLIGHT — SIGNIFICANT CHANGE UP
SODIUM SERPL-SCNC: 140 MMOL/L — SIGNIFICANT CHANGE UP (ref 135–145)
TARGETS BLD QL SMEAR: SLIGHT — SIGNIFICANT CHANGE UP
URATE SERPL-MCNC: 9.3 MG/DL — HIGH (ref 3.4–8.8)
WBC # BLD: 18.73 K/UL — HIGH (ref 3.8–10.5)
WBC # BLD: 19.18 K/UL — HIGH (ref 3.8–10.5)
WBC # FLD AUTO: 18.73 K/UL — HIGH (ref 3.8–10.5)
WBC # FLD AUTO: 19.18 K/UL — HIGH (ref 3.8–10.5)

## 2025-04-11 PROCEDURE — 76705 ECHO EXAM OF ABDOMEN: CPT | Mod: 26,GC

## 2025-04-11 PROCEDURE — 71045 X-RAY EXAM CHEST 1 VIEW: CPT | Mod: 26

## 2025-04-11 PROCEDURE — 99291 CRITICAL CARE FIRST HOUR: CPT | Mod: 25

## 2025-04-11 PROCEDURE — 76604 US EXAM CHEST: CPT | Mod: 26,GC

## 2025-04-11 PROCEDURE — 93308 TTE F-UP OR LMTD: CPT | Mod: 26,GC

## 2025-04-11 PROCEDURE — 36800 INSERTION OF CANNULA: CPT | Mod: GC,59

## 2025-04-11 PROCEDURE — 99232 SBSQ HOSP IP/OBS MODERATE 35: CPT | Mod: GC

## 2025-04-11 PROCEDURE — 99233 SBSQ HOSP IP/OBS HIGH 50: CPT | Mod: GC

## 2025-04-11 DEVICE — SYS NEXPOWDER HEMOSTATIC: Type: IMPLANTABLE DEVICE | Status: FUNCTIONAL

## 2025-04-11 RX ORDER — DIAZEPAM 2 MG/1
40 TABLET ORAL EVERY 8 HOURS
Refills: 0 | Status: DISCONTINUED | OUTPATIENT
Start: 2025-04-11 | End: 2025-04-11

## 2025-04-11 RX ORDER — DIAZEPAM 2 MG/1
10 TABLET ORAL EVERY 4 HOURS
Refills: 0 | Status: DISCONTINUED | OUTPATIENT
Start: 2025-04-11 | End: 2025-04-12

## 2025-04-11 RX ORDER — DIAZEPAM 2 MG/1
40 TABLET ORAL THREE TIMES A DAY
Refills: 0 | Status: DISCONTINUED | OUTPATIENT
Start: 2025-04-11 | End: 2025-04-11

## 2025-04-11 RX ORDER — LEVETIRACETAM 10 MG/ML
500 INJECTION, SOLUTION INTRAVENOUS EVERY 12 HOURS
Refills: 0 | Status: DISCONTINUED | OUTPATIENT
Start: 2025-04-11 | End: 2025-04-18

## 2025-04-11 RX ORDER — ACETAMINOPHEN 500 MG/5ML
1000 LIQUID (ML) ORAL ONCE
Refills: 0 | Status: COMPLETED | OUTPATIENT
Start: 2025-04-11 | End: 2025-04-11

## 2025-04-11 RX ORDER — NOREPINEPHRINE BITARTRATE 8 MG
0.05 SOLUTION INTRAVENOUS
Qty: 8 | Refills: 0 | Status: DISCONTINUED | OUTPATIENT
Start: 2025-04-11 | End: 2025-04-12

## 2025-04-11 RX ADMIN — IPRATROPIUM BROMIDE AND ALBUTEROL SULFATE 3 MILLILITER(S): .5; 2.5 SOLUTION RESPIRATORY (INHALATION) at 19:24

## 2025-04-11 RX ADMIN — Medication 1 APPLICATION(S): at 05:37

## 2025-04-11 RX ADMIN — PROPOFOL 26.6 MICROGRAM(S)/KG/MIN: 10 INJECTION, EMULSION INTRAVENOUS at 11:57

## 2025-04-11 RX ADMIN — Medication 40 MILLIGRAM(S): at 17:18

## 2025-04-11 RX ADMIN — LEVETIRACETAM 500 MILLIGRAM(S): 10 INJECTION, SOLUTION INTRAVENOUS at 17:18

## 2025-04-11 RX ADMIN — DIAZEPAM 10 MILLIGRAM(S): 2 TABLET ORAL at 17:46

## 2025-04-11 RX ADMIN — Medication 40 MILLIGRAM(S): at 05:39

## 2025-04-11 RX ADMIN — Medication 1 APPLICATION(S): at 17:19

## 2025-04-11 RX ADMIN — DIAZEPAM 10 MILLIGRAM(S): 2 TABLET ORAL at 14:24

## 2025-04-11 RX ADMIN — IPRATROPIUM BROMIDE AND ALBUTEROL SULFATE 3 MILLILITER(S): .5; 2.5 SOLUTION RESPIRATORY (INHALATION) at 15:03

## 2025-04-11 RX ADMIN — DIAZEPAM 30 MILLIGRAM(S): 2 TABLET ORAL at 05:37

## 2025-04-11 RX ADMIN — Medication 400 MILLIGRAM(S): at 08:39

## 2025-04-11 RX ADMIN — IPRATROPIUM BROMIDE AND ALBUTEROL SULFATE 3 MILLILITER(S): .5; 2.5 SOLUTION RESPIRATORY (INHALATION) at 09:06

## 2025-04-11 RX ADMIN — PROPOFOL 26.6 MICROGRAM(S)/KG/MIN: 10 INJECTION, EMULSION INTRAVENOUS at 19:15

## 2025-04-11 RX ADMIN — MIDODRINE HYDROCHLORIDE 10 MILLIGRAM(S): 5 TABLET ORAL at 05:37

## 2025-04-11 RX ADMIN — Medication 15 MILLILITER(S): at 05:37

## 2025-04-11 RX ADMIN — DIAZEPAM 10 MILLIGRAM(S): 2 TABLET ORAL at 22:40

## 2025-04-11 RX ADMIN — Medication 1000 MILLIGRAM(S): at 08:20

## 2025-04-11 RX ADMIN — LEVETIRACETAM 500 MILLIGRAM(S): 10 INJECTION, SOLUTION INTRAVENOUS at 05:37

## 2025-04-11 RX ADMIN — Medication 650 MILLIGRAM(S): at 05:37

## 2025-04-11 RX ADMIN — Medication 15 MILLILITER(S): at 17:18

## 2025-04-11 RX ADMIN — IPRATROPIUM BROMIDE AND ALBUTEROL SULFATE 3 MILLILITER(S): .5; 2.5 SOLUTION RESPIRATORY (INHALATION) at 03:16

## 2025-04-11 NOTE — PROGRESS NOTE ADULT - PROBLEM SELECTOR PLAN 1
Pt w/ oliguric MATA iso hemorrhagic shock and PEA arrest. Likely ATN. UA (3/16) w/ proteinuria and hematuria (21 RBC). Renal US (3/20) w/o hydronephrosis, and w/ large left renal subcapsular hematoma. CXR (3/22) w/ b/l hazy opacities. MRI w/ global hypoxic injury.   -NTDC placed and underwent HD on 3/24 iso worsening oliguric renal failure, w/ associated hyperkalemia and hypervolemia. Pt was anuric despite prior diuretic challenge. IJ NTDC removed 3/31 after HD on 3/29 as pt was non-oliguric and Cr plateaued to ~5.1-5.4. However, labs on 4/3 revealed elevated  and acidotic SCO2 14. Since pt planned for trach/PEG on 4/4, HD was done on 4/3 to optimize him for trach/peg (4/4) via new right femoral NTDC placed on 4/2. Last HD 4/8, tolerated 1.5L UF. HD access removed afterwards.      -Pt non-oliguric, UOP 895cc/24hr, but not enough clearance. BUN now >100, Will resume iHD today. Pt will need HD access. Team aware  -TDC as per GOC which are ongoing      Eldon Hamilton  Nephrology Fellow  Feel free to contact me on TEAMS  After 5 pm and on weekends please contact the on-call Fellow.

## 2025-04-11 NOTE — PROCEDURE NOTE - NSINDICATIONS_GEN_A_CORE
dialysis/CRRT
critical illness/emergency venous access
dialysis/CRRT
emergency venous access
critical patient/monitoring purposes
dialysis/CRRT

## 2025-04-11 NOTE — PROGRESS NOTE ADULT - SUBJECTIVE AND OBJECTIVE BOX
INTERVAL HPI/OVERNIGHT EVENTS: Hgb = 5.5 and patient received 1 U of PRBC.    SUBJECTIVE: Patient seen and examined at bedside. ROS could not be elicited due to AA0x0    ROS: All negative except as listed above.    VITAL SIGNS:  ICU Vital Signs Last 24 Hrs  T(C): 38 (11 Apr 2025 08:00), Max: 38.3 (10 Apr 2025 19:00)  T(F): 100.4 (11 Apr 2025 08:00), Max: 100.9 (10 Apr 2025 19:00)  HR: 106 (11 Apr 2025 08:00) (106 - 133)  BP: 114/68 (11 Apr 2025 08:00) (110/67 - 130/79)  BP(mean): 81 (11 Apr 2025 08:00) (80 - 94)  ABP: --  ABP(mean): --  RR: 23 (11 Apr 2025 08:00) (17 - 31)  SpO2: 97% (11 Apr 2025 08:00) (93% - 100%)    O2 Parameters below as of 11 Apr 2025 07:00  Patient On (Oxygen Delivery Method): ventilator    O2 Concentration (%): 30      Mode: CPAP with PS, FiO2: 30, PEEP: 6, PS: 15, ITime: 0.8, MAP: 10, PIP: 22  Plateau pressure:   P/F ratio:     04-10 @ 07:01  -  04-11 @ 07:00  --------------------------------------------------------  IN: 587.2 mL / OUT: 1295 mL / NET: -707.8 mL    04-11 @ 07:01  -  04-11 @ 08:17  --------------------------------------------------------  IN: 5.3 mL / OUT: 35 mL / NET: -29.7 mL      CAPILLARY BLOOD GLUCOSE      POCT Blood Glucose.: 117 mg/dL (11 Apr 2025 05:49)      ECG: reviewed.    PHYSICAL EXAM:    General: NAD, normal habitus, mild jaundice   Neuro: AAOx0, sedated & non responsive to noxious stimuli, closed eyes, no verbal sounds, 4 mm pupils, sluggishly reactive pupils b/l   HEENT: icteric sclerae b/l, tracheostomy clean/dry/intact  Chest: non-tender to palpation  Heart: S1/S2, RRR   Lungs: No adventitious sounds auscultated.   Abd: soft, non-tender, but slightly distended.  Ext: Generalized trace pitting edema, LUE wrapped, nodule on R 1st digit proximal phalanges   Pulses: radial 2+ b/l, dorsalis pedis 2+ b/l   Lines/tubes/drains: NGT, Dave, & Flexiseal  Psych: unable to assess       MEDICATIONS:  MEDICATIONS  (STANDING):  acetaminophen   IVPB .. 1000 milliGRAM(s) IV Intermittent once  albuterol/ipratropium for Nebulization 3 milliLiter(s) Nebulizer every 6 hours  allopurinol 50 milliGRAM(s) Oral <User Schedule>  chlorhexidine 0.12% Liquid 15 milliLiter(s) Oral Mucosa every 12 hours  chlorhexidine 2% Cloths 1 Application(s) Topical daily  diazepam    Tablet 30 milliGRAM(s) Oral three times a day  doxazosin 1 milliGRAM(s) Oral at bedtime  folic acid 1 milliGRAM(s) Oral daily  influenza   Vaccine 0.5 milliLiter(s) IntraMuscular once  levETIRAcetam  Solution 500 milliGRAM(s) Oral two times a day  midodrine 10 milliGRAM(s) Oral every 8 hours  pantoprazole  Injectable 40 milliGRAM(s) IV Push every 12 hours  petrolatum Ophthalmic Ointment 1 Application(s) Both EYES two times a day  propofol Infusion 50 MICROgram(s)/kG/Min (26.6 mL/Hr) IV Continuous <Continuous>  sodium bicarbonate 650 milliGRAM(s) Oral three times a day    MEDICATIONS  (PRN):      ALLERGIES:  Allergies    penicillin (Pruritus)  hydroxyurea (Other)  piperacillin-tazobactam (Urticaria)  ceftriaxone (Anaphylaxis)    Intolerances        LABS:                        5.5    19.18 )-----------( 254      ( 11 Apr 2025 00:16 )             16.0     04-11    140  |  101  |  105[H]  ----------------------------<  97  4.3   |  18[L]  |  4.05[H]    Ca    9.1      11 Apr 2025 00:16  Phos  6.9     04-11  Mg     2.00     04-11    TPro  6.5  /  Alb  2.4[L]  /  TBili  8.7[H]  /  DBili  7.7[H]  /  AST  128[H]  /  ALT  25  /  AlkPhos  190[H]  04-11    PT/INR - ( 11 Apr 2025 00:16 )   PT: 12.4 sec;   INR: 1.04 ratio         PTT - ( 11 Apr 2025 00:16 )  PTT:25.8 sec  Urinalysis Basic - ( 11 Apr 2025 00:16 )    Color: x / Appearance: x / SG: x / pH: x  Gluc: 97 mg/dL / Ketone: x  / Bili: x / Urobili: x   Blood: x / Protein: x / Nitrite: x   Leuk Esterase: x / RBC: x / WBC x   Sq Epi: x / Non Sq Epi: x / Bacteria: x      ABG:      vBG:  pH, Venous: 7.39 (04-11-25 @ 00:16)  pCO2, Venous: 36 mmHg (04-11-25 @ 00:16)  pO2, Venous: 50 mmHg (04-11-25 @ 00:16)  HCO3, Venous: 22 mmol/L (04-11-25 @ 00:16)    Micro:    Culture - Blood (collected 04-08-25 @ 05:25)  Source: Blood Blood-Peripheral  Preliminary Report (04-10-25 @ 11:02):    No growth at 48 Hours    Culture - Blood (collected 03-20-25 @ 10:04)  Source: Blood Blood-Peripheral  Final Report (03-25-25 @ 13:01):    No growth at 5 days        Culture - Sputum (collected 04-08-25 @ 08:00)  Source: ET Tube ET Tube  Gram Stain (04-08-25 @ 19:33):    Moderate Squamous epithelial cells per low power field    Few polymorphonuclear leukocytes per low power field    Few Yeast like cells per oil power field  Final Report (04-10-25 @ 09:06):    Commensal lay consistent with body site    Culture - Sputum (collected 03-26-25 @ 11:45)  Source: ET Tube ET Tube  Gram Stain (03-26-25 @ 22:28):    Few Squamous epithelial cells per low power field    Moderate polymorphonuclear leukocytes per low power field    Few Yeast per oil power field  Final Report (03-28-25 @ 08:15):    Commensal aly consistent with body site    Culture - Sputum (collected 03-22-25 @ 17:15)  Source: ET Tube ET Tube  Gram Stain (03-22-25 @ 23:42):    Numerous polymorphonuclear leukocytes per low power field    Rare Squamous epithelial cells per low power field    No organisms seen per oil power field  Final Report (03-24-25 @ 06:52):    Commensal lay consistent with body site        RADIOLOGY & ADDITIONAL TESTS: Reviewed.

## 2025-04-11 NOTE — PROGRESS NOTE ADULT - SUBJECTIVE AND OBJECTIVE BOX
[facility] DIVISION OF KIDNEY DISEASES AND HYPERTENSION   FOLLOW UP NOTE    --------------------------------------------------------------------------------  Chief Complaint:    24 hour events/subjective: Pt. was seen and examined today in MICU. Remains sedated.       PAST HISTORY  --------------------------------------------------------------------------------  No significant changes to PMH, PSH, FHx, SHx, unless otherwise noted    ALLERGIES & MEDICATIONS  --------------------------------------------------------------------------------  Allergies    penicillin (Pruritus)  hydroxyurea (Other)  piperacillin-tazobactam (Urticaria)  ceftriaxone (Anaphylaxis)    Intolerances      Standing Inpatient Medications  albuterol/ipratropium for Nebulization 3 milliLiter(s) Nebulizer every 6 hours  allopurinol 50 milliGRAM(s) Oral <User Schedule>  chlorhexidine 0.12% Liquid 15 milliLiter(s) Oral Mucosa every 12 hours  chlorhexidine 2% Cloths 1 Application(s) Topical daily  diazepam  Injectable 10 milliGRAM(s) IV Push every 4 hours  doxazosin 1 milliGRAM(s) Oral at bedtime  folic acid 1 milliGRAM(s) Oral daily  influenza   Vaccine 0.5 milliLiter(s) IntraMuscular once  levETIRAcetam  Solution 500 milliGRAM(s) Oral two times a day  midodrine 10 milliGRAM(s) Oral every 8 hours  norepinephrine Infusion 0.05 MICROgram(s)/kG/Min IV Continuous <Continuous>  pantoprazole  Injectable 40 milliGRAM(s) IV Push every 12 hours  petrolatum Ophthalmic Ointment 1 Application(s) Both EYES two times a day  propofol Infusion 50 MICROgram(s)/kG/Min IV Continuous <Continuous>  sodium bicarbonate 650 milliGRAM(s) Oral three times a day    PRN Inpatient Medications  diazepam  Injectable 10 milliGRAM(s) IV Push every 4 hours PRN      REVIEW OF SYSTEMS  --------------------------------------------------------------------------------  unable to obtain due to clinic status    VITALS/PHYSICAL EXAM  --------------------------------------------------------------------------------  T(C): 38 (04-11-25 @ 12:00), Max: 38.3 (04-10-25 @ 19:00)  HR: 114 (04-11-25 @ 12:00) (95 - 133)  BP: 136/75 (04-11-25 @ 12:00) (110/67 - 136/75)  RR: 29 (04-11-25 @ 12:00) (22 - 31)  SpO2: 98% (04-11-25 @ 12:00) (93% - 100%)  Wt(kg): --        04-10-25 @ 07:01  -  04-11-25 @ 07:00  --------------------------------------------------------  IN: 587.2 mL / OUT: 1295 mL / NET: -707.8 mL    04-11-25 @ 07:01  -  04-11-25 @ 13:00  --------------------------------------------------------  IN: 5.3 mL / OUT: 35 mL / NET: -29.7 mL        Physical Exam:  	Gen: Ill appearing   	HEENT: Trached  	Pulm: CTA B/L  	CV: S1S2+  	Abd: Distended    	Ext: +edema  	Neuro: Sedated  	Skin: Warm and dry  	Dialysis access: None      LABS/STUDIES  --------------------------------------------------------------------------------              5.5    19.18 >-----------<  254      [04-11-25 @ 00:16]              16.0     140  |  101  |  105  ----------------------------<  97      [04-11-25 @ 00:16]  4.3   |  18  |  4.05        Ca     9.1     [04-11-25 @ 00:16]      iCa    1.20     [04-11 @ 00:16]      Mg     2.00     [04-11-25 @ 00:16]      Phos  6.9     [04-11-25 @ 00:16]    TPro  6.5  /  Alb  2.4  /  TBili  8.7  /  DBili  7.7  /  AST  128  /  ALT  25  /  AlkPhos  190  [04-11-25 @ 00:16]    PT/INR: PT 12.4 , INR 1.04       [04-11-25 @ 00:16]  PTT: 25.8       [04-11-25 @ 00:16]    Uric acid 9.3      [04-11-25 @ 00:16]        [04-11-25 @ 00:16]    Creatinine Trend:  SCr 4.05 [04-11 @ 00:16]  SCr 3.50 [04-10 @ 01:29]  SCr 2.76 [04-09 @ 00:55]  SCr 4.61 [04-08 @ 00:33]  SCr 4.22 [04-07 @ 00:24]    Urinalysis - [04-11-25 @ 00:16]      Color  / Appearance  / SG  / pH       Gluc 97 / Ketone   / Bili  / Urobili        Blood  / Protein  / Leuk Est  / Nitrite       RBC  / WBC  / Hyaline  / Gran  / Sq Epi  / Non Sq Epi  / Bacteria       HBsAb 50.9      [03-29-25 @ 07:15]  HBsAg Nonreact      [03-17-25 @ 15:26]  HBcAb Nonreact      [03-29-25 @ 07:15]  HCV 0.10, Nonreact      [03-17-25 @ 15:26]  HIV Nonreact      [03-17-25 @ 15:26]      Tacrolimus  Cyclosporine  Sirolimus  Mycophenolate  BK PCR  CMV PCRCMVPCR Log: NotDetec Fnf99XV/mL (12-27 @ 05:38)    Parvo PCR  EBV PCR

## 2025-04-11 NOTE — CHART NOTE - NSCHARTNOTEFT_GEN_A_CORE
Assessment: 44y Male with PMHx of sickle cell disease admitted for anemia concerning for sickle cell crisis s/p ureteroscopy w/ L kidney biopsy 3/19, post-operative course c/b acute hemorrhagic shock, PEA arrest with ROSC; currently with acute renal failure on HD and global anoxic brain injury and urothelial cancer; s/p tracheostomy and pending GOC discussions to decide on possible interventions and dispo. GI consulted for melena & downtrending hemoglobin and pending EGD with possible PEG placement.    Plan:   - Discussed with primary team, given unclear GOC at this time, will hold off on tunneled catheter. IR recommendations include central line placement in ICU for temporary dialysis if needed. Please re-consult IR if the decision is made in concordance with family and patient wishes to pursue a more permanent line.    Bernabe Nick MD  PGY-III, Diagnostic Radiology Resident    -Available on [employer] TEAMS  -Emergent issues: [facility]-p.[phone]; [facility]-p.[phone] ([phone])  -Non-emergent consults: Please place a [facility] order "IR Consult" with an appropriate callback number  -Scheduling questions: [facility]: [phone]; [facility]: [phone]  -Clinic/Outpatient booking: [facility]: [phone]; [facility]: [phone]

## 2025-04-11 NOTE — PROGRESS NOTE ADULT - ATTENDING COMMENTS
MTAA  ATN with Oliguria    Will need dialysis, please plan for TDC placement  Will reassess labs and volume status in AM  Monitor for now

## 2025-04-11 NOTE — CHART NOTE - NSCHARTNOTEFT_GEN_A_CORE
Palliative provider received message from patient's brother Bala Downey confirming FAMILY MEETING FOR 11AM on Wednesday 2/16.     Discussed with MICU team, Dr. Bella. Would appreciate if other consultants can join family meeting to minimize any miscommunication that the patient's brother feels he may be receiving from having conversations in rosette with medical providers.     Thank you for allowing us to participate in your patient's care. We will continue to follow with you. Please page [phone] for any q's or c's. The Geriatric and Palliative Medicine service has coverage 24 hours a day/ 7 days a week to provide medical recommendations regarding symptom management needs via telephone.    Eve Tavares D.O.   Palliative Medicine Palliative provider received message from patient's brother Bala Downey confirming FAMILY MEETING FOR 11AM on Wednesday 4/16.     Discussed with MICU team, Dr. Bella. Would appreciate if other consultants can join family meeting to minimize any miscommunication that the patient's brother feels he may be receiving from having conversations in rosette with medical providers.     Thank you for allowing us to participate in your patient's care. We will continue to follow with you. Please page [phone] for any q's or c's. The Geriatric and Palliative Medicine service has coverage 24 hours a day/ 7 days a week to provide medical recommendations regarding symptom management needs via telephone.    Eve Tavares D.O.   Palliative Medicine

## 2025-04-11 NOTE — PROGRESS NOTE ADULT - ATTENDING COMMENTS
1. Acute hypoxemic respiratory failure s/p cardiac arrest. Pt now with tracheostomy tube. Tolerating AC vent setting. PS trial today as tolerated. .  2. Neuro: Anoxic brain injury. Pt with myoclonic seizures. Continue Keppra. Increase Valium to 40mg tid. . Trial off propofol.  3. Surgical input for PEG/PEJ appreciated.   4.ID. Pt started on meropenem for fevers.  Finished 5 day course of meropenem.  5.HEME. H/O ss anemia. Keep HB>6. Transfused 1 unit PRBC yesterday and today.  Pt for EGD after PRBC transfusion for last 3 days.  6. Renal: Acute renal failure s/p cardiac arrest. Likely ATN. Intermittent HD .  7. Renal bx with urothelial cancer.  8.DVT prophylaxis: SCD  9. GOC: Full code . Discussed with family that pt will never be independent and will live in nursing home for the rest of his life. One family member said he had discussed possibility and being very sick. "He would never want  to be a vegetable."   Trying to assemble a  family meeting or call.

## 2025-04-11 NOTE — PROCEDURE NOTE - NSINFORMCONSENT_GEN_A_CORE
Benefits, risks, and possible complications of procedure explained to patient/caregiver who verbalized understanding and gave written consent.
This was an emergent procedure.
Benefits, risks, and possible complications of procedure explained to patient/caregiver who verbalized understanding and gave verbal consent.
Benefits, risks, and possible complications of procedure explained to patient/caregiver who verbalized understanding and gave verbal consent.
This was an emergent procedure.
Benefits, risks, and possible complications of procedure explained to patient/caregiver who verbalized understanding and gave verbal consent.
This was an emergent procedure.
: Yes

## 2025-04-11 NOTE — CHART NOTE - NSCHARTNOTEFT_GEN_A_CORE
: Antonio Brandon  INDICATION: AHRF, GIB    PROCEDURE:  [X] LIMITED ECHO  [X] LIMITED CHEST  [ ] LIMITED RETROPERITONEAL  [X] LIMITED ABDOMINAL  [ ] LIMITED DVT  [ ] NEEDLE GUIDANCE VASCULAR  [ ] NEEDLE GUIDANCE THORACENTESIS  [ ] NEEDLE GUIDANCE PARACENTESIS  [ ] NEEDLE GUIDANCE PERICARDIOCENTESIS  [ ] OTHER    FINDINGS/INTERPRETATION:  Lungs  A lines bilaterally  No effusions    Heart  Normal LV Systolic function    Abdomen  Moderate ascites    Images in [facility]path : Antonio Brandon  INDICATION: AHRF, GIB    PROCEDURE:  [X] LIMITED ECHO  [X] LIMITED CHEST  [ ] LIMITED RETROPERITONEAL  [X] LIMITED ABDOMINAL  [ ] LIMITED DVT  [ ] NEEDLE GUIDANCE VASCULAR  [ ] NEEDLE GUIDANCE THORACENTESIS  [ ] NEEDLE GUIDANCE PARACENTESIS  [ ] NEEDLE GUIDANCE PERICARDIOCENTESIS  [ ] OTHER    FINDINGS/INTERPRETATION:  Lungs  A lines bilaterally  No effusions    Heart  Normal LV Systolic function    Abdomen  Moderate ascites    Images in Qpath    I have assisted and supervised entire procedure.     Lucien Aguero MD

## 2025-04-11 NOTE — PROGRESS NOTE ADULT - ASSESSMENT
44 year old male with history of sickle cell disease, UE DVT, gout, HTN, with L renal mass admitted with acute anemia/ sickle cell crisis and evaluation of his left renal mass s/p left ureteroscopy and biopsy (3/19/25) with post operative course complicated by rapid responses for hypoglycemia c/b cardiac arrest x 4 minutes CPR/1 epi with ROSC, transferred to MICU for further care, found to have anoxic brain injury. Interventional pulmonology consulted for evaluation for tracheostomy.    Intubated 3/20/25 for cardiac arrest. Bedside POCUS evaluation revealed 1st tracheal ring with overlying small vessel which is easily compressible and non-pulsatile and without overlying innominate vessel.    Discussed tracheostomy with brother/HCP Bala, risks and benefits explained and written consent obtained.    4/4/25 s/p percutaneous tracheostomy 7 portex placed today with minimal bleeding. Tolerated procedure without issue  POD 7  Post-procedure CXR confirmed tracheostomy tube in appropriate position     Recommendations:  - monitor for bleeding/oozing  - keep back-up tracheostomy in the room at all times  - minimize tension on tracheostomy  - c/w in-line suctioning   - sutures to remain in place for 10d (will remove 4/14)   - Ongoing goals of care discussion  - IP team to follow    Discussed with Dr. Webb and MICU team

## 2025-04-11 NOTE — CHART NOTE - NSCHARTNOTEFT_GEN_A_CORE
Performed EGD at bedside for melena and anemia.     Impression:  - Gastric ulcer with visible vessel. Likely due to NG tube trauma. Injected. Cauterized. Sprayed.   - Three duodenal ulcers in the duodenal bulb, one with pigmented material and oozing. Hemostatic spray was applied.  - NG Tube was removed immediately prior to endoscopy.    Recommendations:  - IV PPI BID  - Monitor for recurrent bleeding  - Avoid NGT placement for 48 hours. Discuss risks vs benefits of replacement for nutrition given ulcer.    D/w MICU team and brother Bala.    Brandon Nguyen MD  Gastroenterology/Hepatology Fellow

## 2025-04-12 LAB
ALBUMIN SERPL ELPH-MCNC: 2.6 G/DL — LOW (ref 3.3–5)
ALP SERPL-CCNC: 210 U/L — HIGH (ref 40–120)
ALT FLD-CCNC: 30 U/L — SIGNIFICANT CHANGE UP (ref 4–41)
ANION GAP SERPL CALC-SCNC: 17 MMOL/L — HIGH (ref 7–14)
AST SERPL-CCNC: 167 U/L — HIGH (ref 4–40)
BILIRUB DIRECT SERPL-MCNC: 7 MG/DL — HIGH (ref 0–0.3)
BILIRUB INDIRECT FLD-MCNC: 1.4 MG/DL — HIGH (ref 0–1)
BILIRUB SERPL-MCNC: 8.4 MG/DL — HIGH (ref 0.2–1.2)
BILIRUB SERPL-MCNC: 8.4 MG/DL — HIGH (ref 0.2–1.2)
BUN SERPL-MCNC: 46 MG/DL — HIGH (ref 7–23)
CALCIUM SERPL-MCNC: 8.6 MG/DL — SIGNIFICANT CHANGE UP (ref 8.4–10.5)
CHLORIDE SERPL-SCNC: 99 MMOL/L — SIGNIFICANT CHANGE UP (ref 98–107)
CK SERPL-CCNC: 33 U/L — SIGNIFICANT CHANGE UP (ref 30–200)
CO2 SERPL-SCNC: 23 MMOL/L — SIGNIFICANT CHANGE UP (ref 22–31)
CREAT SERPL-MCNC: 1.98 MG/DL — HIGH (ref 0.5–1.3)
EGFR: 42 ML/MIN/1.73M2 — LOW
EGFR: 42 ML/MIN/1.73M2 — LOW
GLUCOSE BLDC GLUCOMTR-MCNC: 109 MG/DL — HIGH (ref 70–99)
GLUCOSE SERPL-MCNC: 95 MG/DL — SIGNIFICANT CHANGE UP (ref 70–99)
HAPTOGLOB SERPL-MCNC: <20 MG/DL — LOW (ref 34–200)
LDH SERPL L TO P-CCNC: 443 U/L — HIGH (ref 135–225)
MAGNESIUM SERPL-MCNC: 1.8 MG/DL — SIGNIFICANT CHANGE UP (ref 1.6–2.6)
PHOSPHATE SERPL-MCNC: 4 MG/DL — SIGNIFICANT CHANGE UP (ref 2.5–4.5)
POTASSIUM SERPL-MCNC: 3 MMOL/L — LOW (ref 3.5–5.3)
POTASSIUM SERPL-SCNC: 3 MMOL/L — LOW (ref 3.5–5.3)
PROT SERPL-MCNC: 7 G/DL — SIGNIFICANT CHANGE UP (ref 6–8.3)
SODIUM SERPL-SCNC: 139 MMOL/L — SIGNIFICANT CHANGE UP (ref 135–145)
URATE SERPL-MCNC: 4.6 MG/DL — SIGNIFICANT CHANGE UP (ref 3.4–8.8)

## 2025-04-12 RX ORDER — SODIUM CHLORIDE 9 G/1000ML
1000 INJECTION, SOLUTION INTRAVENOUS
Refills: 0 | Status: DISCONTINUED | OUTPATIENT
Start: 2025-04-12 | End: 2025-04-13

## 2025-04-12 RX ORDER — DIAZEPAM 2 MG/1
10 TABLET ORAL EVERY 4 HOURS
Refills: 0 | Status: DISCONTINUED | OUTPATIENT
Start: 2025-04-12 | End: 2025-04-13

## 2025-04-12 RX ORDER — DIAZEPAM 2 MG/1
10 TABLET ORAL EVERY 4 HOURS
Refills: 0 | Status: DISCONTINUED | OUTPATIENT
Start: 2025-04-12 | End: 2025-04-12

## 2025-04-12 RX ADMIN — LEVETIRACETAM 500 MILLIGRAM(S): 10 INJECTION, SOLUTION INTRAVENOUS at 05:35

## 2025-04-12 RX ADMIN — Medication 1 APPLICATION(S): at 05:37

## 2025-04-12 RX ADMIN — DIAZEPAM 10 MILLIGRAM(S): 2 TABLET ORAL at 10:38

## 2025-04-12 RX ADMIN — DIAZEPAM 10 MILLIGRAM(S): 2 TABLET ORAL at 05:35

## 2025-04-12 RX ADMIN — Medication 100 MILLIEQUIVALENT(S): at 04:01

## 2025-04-12 RX ADMIN — IPRATROPIUM BROMIDE AND ALBUTEROL SULFATE 3 MILLILITER(S): .5; 2.5 SOLUTION RESPIRATORY (INHALATION) at 15:05

## 2025-04-12 RX ADMIN — Medication 100 MILLIEQUIVALENT(S): at 02:00

## 2025-04-12 RX ADMIN — Medication 1 APPLICATION(S): at 11:42

## 2025-04-12 RX ADMIN — SODIUM CHLORIDE 50 MILLILITER(S): 9 INJECTION, SOLUTION INTRAVENOUS at 14:04

## 2025-04-12 RX ADMIN — DIAZEPAM 10 MILLIGRAM(S): 2 TABLET ORAL at 17:16

## 2025-04-12 RX ADMIN — DIAZEPAM 10 MILLIGRAM(S): 2 TABLET ORAL at 14:04

## 2025-04-12 RX ADMIN — LEVETIRACETAM 500 MILLIGRAM(S): 10 INJECTION, SOLUTION INTRAVENOUS at 17:16

## 2025-04-12 RX ADMIN — IPRATROPIUM BROMIDE AND ALBUTEROL SULFATE 3 MILLILITER(S): .5; 2.5 SOLUTION RESPIRATORY (INHALATION) at 19:47

## 2025-04-12 RX ADMIN — Medication 15 MILLILITER(S): at 05:36

## 2025-04-12 RX ADMIN — IPRATROPIUM BROMIDE AND ALBUTEROL SULFATE 3 MILLILITER(S): .5; 2.5 SOLUTION RESPIRATORY (INHALATION) at 09:18

## 2025-04-12 RX ADMIN — Medication 40 MILLIGRAM(S): at 05:35

## 2025-04-12 RX ADMIN — Medication 15 MILLILITER(S): at 17:16

## 2025-04-12 RX ADMIN — Medication 40 MILLIGRAM(S): at 17:17

## 2025-04-12 RX ADMIN — IPRATROPIUM BROMIDE AND ALBUTEROL SULFATE 3 MILLILITER(S): .5; 2.5 SOLUTION RESPIRATORY (INHALATION) at 03:19

## 2025-04-12 RX ADMIN — PROPOFOL 26.6 MICROGRAM(S)/KG/MIN: 10 INJECTION, EMULSION INTRAVENOUS at 07:25

## 2025-04-12 RX ADMIN — Medication 1 APPLICATION(S): at 17:16

## 2025-04-12 RX ADMIN — Medication 100 MILLIEQUIVALENT(S): at 02:59

## 2025-04-12 RX ADMIN — DIAZEPAM 10 MILLIGRAM(S): 2 TABLET ORAL at 02:00

## 2025-04-12 RX ADMIN — DIAZEPAM 10 MILLIGRAM(S): 2 TABLET ORAL at 21:13

## 2025-04-12 NOTE — DISCHARGE NOTE NURSING/CASE MANAGEMENT/SOCIAL WORK - CAREGIVER PHONE NUMBER
Fall risk assessment done on patient and scored moderate. Safety education given; bed placed on low position, care items within reach, emphasized use of call button to seek assistance, educated on the need to place a bed alarm, but patient refused to have bed alarm on. Monitoring to continue to for the rest of the shift.   [phone]

## 2025-04-12 NOTE — PROGRESS NOTE ADULT - SUBJECTIVE AND OBJECTIVE BOX
INTERVAL HPI/OVERNIGHT EVENTS: S/p EGD with duodenal and     SUBJECTIVE: Patient seen and examined at bedside.     ROS: All negative except as listed above.    VITAL SIGNS:  ICU Vital Signs Last 24 Hrs  T(C): 37.2 (12 Apr 2025 08:00), Max: 38.6 (11 Apr 2025 14:00)  T(F): 99 (12 Apr 2025 08:00), Max: 101.5 (11 Apr 2025 14:00)  HR: 98 (12 Apr 2025 09:00) (95 - 118)  BP: 123/99 (12 Apr 2025 08:00) (118/81 - 154/84)  BP(mean): 108 (12 Apr 2025 08:00) (81 - 108)  ABP: --  ABP(mean): --  RR: 16 (12 Apr 2025 09:00) (16 - 30)  SpO2: 100% (12 Apr 2025 09:00) (97% - 100%)    O2 Parameters below as of 12 Apr 2025 09:00  Patient On (Oxygen Delivery Method): ventilator    O2 Concentration (%): 30      Mode: CPAP with PS, FiO2: 30, PEEP: 6, PS: 15, ITime: 0.8, MAP: 10, PIP: 22  Plateau pressure:   P/F ratio:     04-11 @ 07:01  -  04-12 @ 07:00  --------------------------------------------------------  IN: 827.2 mL / OUT: 3740 mL / NET: -2912.8 mL    04-12 @ 07:01  -  04-12 @ 09:01  --------------------------------------------------------  IN: 5.3 mL / OUT: 40 mL / NET: -34.7 mL      CAPILLARY BLOOD GLUCOSE      POCT Blood Glucose.: 109 mg/dL (12 Apr 2025 05:46)      ECG: reviewed.    PHYSICAL EXAM:    GENERAL: NAD, lying in bed comfortably  HEAD:  Atraumatic, normocephalic  EYES: EOMI, PERRLA, conjunctiva and sclera clear  NECK: Supple, trachea midline, no JVD  HEART: Regular rate and rhythm, no murmurs, rubs, or gallops  LUNGS: Unlabored respirations.  Clear to auscultation bilaterally, no crackles, wheezing, or rhonchi  ABDOMEN: Soft, nontender, nondistended, +BS  EXTREMITIES: 2+ peripheral pulses bilaterally, cap refill<2 secs. No clubbing, cyanosis, or edema  NERVOUS SYSTEM:  A&Ox3, following commands, moving all extremities, no focal deficits   SKIN: No rashes or lesions    MEDICATIONS:  MEDICATIONS  (STANDING):  albuterol/ipratropium for Nebulization 3 milliLiter(s) Nebulizer every 6 hours  chlorhexidine 0.12% Liquid 15 milliLiter(s) Oral Mucosa every 12 hours  chlorhexidine 2% Cloths 1 Application(s) Topical daily  chlorhexidine 4% Liquid 1 Application(s) Topical <User Schedule>  diazepam  Injectable 10 milliGRAM(s) IV Push every 4 hours  influenza   Vaccine 0.5 milliLiter(s) IntraMuscular once  levETIRAcetam   Injectable 500 milliGRAM(s) IV Push every 12 hours  pantoprazole  Injectable 40 milliGRAM(s) IV Push every 12 hours  petrolatum Ophthalmic Ointment 1 Application(s) Both EYES two times a day  propofol Infusion 50 MICROgram(s)/kG/Min (26.6 mL/Hr) IV Continuous <Continuous>    MEDICATIONS  (PRN):  diazepam  Injectable 10 milliGRAM(s) IV Push every 4 hours PRN for myoclonus  sodium chloride 0.9% lock flush 10 milliLiter(s) IV Push every 1 hour PRN Pre/post blood products, medications, blood draw, and to maintain line patency      ALLERGIES:  Allergies    penicillin (Pruritus)  hydroxyurea (Other)  piperacillin-tazobactam (Urticaria)  ceftriaxone (Anaphylaxis)    Intolerances        LABS:                        6.6    18.73 )-----------( 233      ( 11 Apr 2025 23:24 )             18.1     04-11    139  |  99  |  46[H]  ----------------------------<  95  3.0[L]   |  23  |  1.98[H]    Ca    8.6      11 Apr 2025 23:24  Phos  4.0     04-11  Mg     1.80     04-11    TPro  7.0  /  Alb  2.6[L]  /  TBili  8.4[H]  /  DBili  7.0[H]  /  AST  167[H]  /  ALT  30  /  AlkPhos  210[H]  04-11    PT/INR - ( 11 Apr 2025 23:24 )   PT: 12.0 sec;   INR: 1.03 ratio         PTT - ( 11 Apr 2025 23:24 )  PTT:27.9 sec  Urinalysis Basic - ( 11 Apr 2025 23:24 )    Color: x / Appearance: x / SG: x / pH: x  Gluc: 95 mg/dL / Ketone: x  / Bili: x / Urobili: x   Blood: x / Protein: x / Nitrite: x   Leuk Esterase: x / RBC: x / WBC x   Sq Epi: x / Non Sq Epi: x / Bacteria: x      ABG:      vBG:  pH, Venous: 7.46 (04-11-25 @ 23:24)  pCO2, Venous: 37 mmHg (04-11-25 @ 23:24)  pO2, Venous: 73 mmHg (04-11-25 @ 23:24)  HCO3, Venous: 26 mmol/L (04-11-25 @ 23:24)    Micro:    Culture - Blood (collected 04-08-25 @ 05:25)  Source: Blood Blood-Peripheral  Preliminary Report (04-11-25 @ 11:00):    No growth at 72 Hours    Culture - Blood (collected 03-20-25 @ 10:04)  Source: Blood Blood-Peripheral  Final Report (03-25-25 @ 13:01):    No growth at 5 days        Culture - Sputum (collected 04-08-25 @ 08:00)  Source: ET Tube ET Tube  Gram Stain (04-08-25 @ 19:33):    Moderate Squamous epithelial cells per low power field    Few polymorphonuclear leukocytes per low power field    Few Yeast like cells per oil power field  Final Report (04-10-25 @ 09:06):    Commensal lay consistent with body site    Culture - Sputum (collected 03-26-25 @ 11:45)  Source: ET Tube ET Tube  Gram Stain (03-26-25 @ 22:28):    Few Squamous epithelial cells per low power field    Moderate polymorphonuclear leukocytes per low power field    Few Yeast per oil power field  Final Report (03-28-25 @ 08:15):    Commensal lay consistent with body site    Culture - Sputum (collected 03-22-25 @ 17:15)  Source: ET Tube ET Tube  Gram Stain (03-22-25 @ 23:42):    Numerous polymorphonuclear leukocytes per low power field    Rare Squamous epithelial cells per low power field    No organisms seen per oil power field  Final Report (03-24-25 @ 06:52):    Commensal lay consistent with body site        RADIOLOGY & ADDITIONAL TESTS: Reviewed.   INTERVAL HPI/OVERNIGHT EVENTS: S/p EGD with duodenal and gastric ulcers that were hemostatic sprayed and cauterized, respectively. OVN, T >101 F and no blood cxs grown. Patient also had XANDER espinoza placed yesterday (04/11).    SUBJECTIVE: Patient seen and examined at bedside. ROS could not be elicited.  VITAL SIGNS:  ICU Vital Signs Last 24 Hrs  T(C): 37.2 (12 Apr 2025 08:00), Max: 38.6 (11 Apr 2025 14:00)  T(F): 99 (12 Apr 2025 08:00), Max: 101.5 (11 Apr 2025 14:00)  HR: 98 (12 Apr 2025 09:00) (95 - 118)  BP: 123/99 (12 Apr 2025 08:00) (118/81 - 154/84)  BP(mean): 108 (12 Apr 2025 08:00) (81 - 108)  ABP: --  ABP(mean): --  RR: 16 (12 Apr 2025 09:00) (16 - 30)  SpO2: 100% (12 Apr 2025 09:00) (97% - 100%)    O2 Parameters below as of 12 Apr 2025 09:00  Patient On (Oxygen Delivery Method): ventilator    O2 Concentration (%): 30      Mode: CPAP with PS, FiO2: 30, PEEP: 6, PS: 15, ITime: 0.8, MAP: 10, PIP: 22  Plateau pressure:   P/F ratio:     04-11 @ 07:01  -  04-12 @ 07:00  --------------------------------------------------------  IN: 827.2 mL / OUT: 3740 mL / NET: -2912.8 mL    04-12 @ 07:01  -  04-12 @ 09:01  --------------------------------------------------------  IN: 5.3 mL / OUT: 40 mL / NET: -34.7 mL      CAPILLARY BLOOD GLUCOSE      POCT Blood Glucose.: 109 mg/dL (12 Apr 2025 05:46)      ECG: reviewed.    PHYSICAL EXAM:    General: NAD, normal habitus, mild jaundice   Neuro: AAOx0, sedated & non responsive to noxious stimuli, closed eyes, no verbal sounds, 4 mm pupils, sluggishly reactive pupils b/l   HEENT: icteric sclerae b/l, tracheostomy clean/dry/intact  Chest: non-tender to palpation  Heart: S1/S2, RRR   Lungs: No adventitious sounds auscultated.   Abd: soft, non-tender, but slightly distended.  Ext: Generalized trace pitting edema, LUE wrapped, nodule on R 1st digit proximal phalanges   Pulses: radial 2+ b/l, dorsalis pedis 2+ b/l   Lines/tubes/drains: NGT, Dave, & Flexiseal. L IJ shiley (04/11) & R. accucath + Midline (03/25)  Psych: unable to assess       MEDICATIONS:  MEDICATIONS  (STANDING):  albuterol/ipratropium for Nebulization 3 milliLiter(s) Nebulizer every 6 hours  chlorhexidine 0.12% Liquid 15 milliLiter(s) Oral Mucosa every 12 hours  chlorhexidine 2% Cloths 1 Application(s) Topical daily  chlorhexidine 4% Liquid 1 Application(s) Topical <User Schedule>  diazepam  Injectable 10 milliGRAM(s) IV Push every 4 hours  influenza   Vaccine 0.5 milliLiter(s) IntraMuscular once  levETIRAcetam   Injectable 500 milliGRAM(s) IV Push every 12 hours  pantoprazole  Injectable 40 milliGRAM(s) IV Push every 12 hours  petrolatum Ophthalmic Ointment 1 Application(s) Both EYES two times a day  propofol Infusion 50 MICROgram(s)/kG/Min (26.6 mL/Hr) IV Continuous <Continuous>    MEDICATIONS  (PRN):  diazepam  Injectable 10 milliGRAM(s) IV Push every 4 hours PRN for myoclonus  sodium chloride 0.9% lock flush 10 milliLiter(s) IV Push every 1 hour PRN Pre/post blood products, medications, blood draw, and to maintain line patency      ALLERGIES:  Allergies    penicillin (Pruritus)  hydroxyurea (Other)  piperacillin-tazobactam (Urticaria)  ceftriaxone (Anaphylaxis)    Intolerances        LABS:                        6.6    18.73 )-----------( 233      ( 11 Apr 2025 23:24 )             18.1     04-11    139  |  99  |  46[H]  ----------------------------<  95  3.0[L]   |  23  |  1.98[H]    Ca    8.6      11 Apr 2025 23:24  Phos  4.0     04-11  Mg     1.80     04-11    TPro  7.0  /  Alb  2.6[L]  /  TBili  8.4[H]  /  DBili  7.0[H]  /  AST  167[H]  /  ALT  30  /  AlkPhos  210[H]  04-11    PT/INR - ( 11 Apr 2025 23:24 )   PT: 12.0 sec;   INR: 1.03 ratio         PTT - ( 11 Apr 2025 23:24 )  PTT:27.9 sec  Urinalysis Basic - ( 11 Apr 2025 23:24 )    Color: x / Appearance: x / SG: x / pH: x  Gluc: 95 mg/dL / Ketone: x  / Bili: x / Urobili: x   Blood: x / Protein: x / Nitrite: x   Leuk Esterase: x / RBC: x / WBC x   Sq Epi: x / Non Sq Epi: x / Bacteria: x      ABG:      vBG:  pH, Venous: 7.46 (04-11-25 @ 23:24)  pCO2, Venous: 37 mmHg (04-11-25 @ 23:24)  pO2, Venous: 73 mmHg (04-11-25 @ 23:24)  HCO3, Venous: 26 mmol/L (04-11-25 @ 23:24)    Micro:    Culture - Blood (collected 04-08-25 @ 05:25)  Source: Blood Blood-Peripheral  Preliminary Report (04-11-25 @ 11:00):    No growth at 72 Hours    Culture - Blood (collected 03-20-25 @ 10:04)  Source: Blood Blood-Peripheral  Final Report (03-25-25 @ 13:01):    No growth at 5 days        Culture - Sputum (collected 04-08-25 @ 08:00)  Source: ET Tube ET Tube  Gram Stain (04-08-25 @ 19:33):    Moderate Squamous epithelial cells per low power field    Few polymorphonuclear leukocytes per low power field    Few Yeast like cells per oil power field  Final Report (04-10-25 @ 09:06):    Commensal lay consistent with body site    Culture - Sputum (collected 03-26-25 @ 11:45)  Source: ET Tube ET Tube  Gram Stain (03-26-25 @ 22:28):    Few Squamous epithelial cells per low power field    Moderate polymorphonuclear leukocytes per low power field    Few Yeast per oil power field  Final Report (03-28-25 @ 08:15):    Commensal lay consistent with body site    Culture - Sputum (collected 03-22-25 @ 17:15)  Source: ET Tube ET Tube  Gram Stain (03-22-25 @ 23:42):    Numerous polymorphonuclear leukocytes per low power field    Rare Squamous epithelial cells per low power field    No organisms seen per oil power field  Final Report (03-24-25 @ 06:52):    Commensal lay consistent with body site        RADIOLOGY & ADDITIONAL TESTS: Reviewed.

## 2025-04-12 NOTE — PROGRESS NOTE ADULT - ASSESSMENT
Assessment and Plan: 	  Patient is 44y Male with PMHx of sickle cell disease admitted for anemia concerning for sickle cell crisis s/p ureteroscopy w/ L kidney biopsy 3/19, post-operative course c/b acute hemorrhagic shock, PEA arrest with ROSC; currently with acute renal failure on HD and global anoxic brain injury and urothelial cancer; s/p tracheostomy and pending GOC discussions to decide on possible interventions and dispo. GI consulted for melena patient s/p EGD with cauterization of gastric ulcer & hemostatic spraying of three duodenal ulcers     NEURO:  #Global anoxic brain injury   AAOx0. On sedation with no further myoclonus. Guarded prognosis.   - Currently on propofol gtt. Attempt to wean off propofol. Since no NGT for now, c/w IVP Valium 10 mg Q4H standing and 10 mg Q4H PRN for myoclonus (switch to Valium 40 mg PO TID when possible).  - Repeat CT B 3/24 showing diffuse sulcal effacement with increased intracranial pressure, and few patchy foci of cortical blurring   - 3/25 MRIB with diffuse global abnormal supratentorial cortical restricted diffusion consistent with global hypoxic injury     #Seizures  No further myoclonus of upper body after restarting propofol.   - Witness myoclonic jerking concerning for seizures iso anoxic brain injury  - s/p 2.5g keppra load & midazolam 2mg IV q8 PRN   - vEEG report: "Abundant myoclonic seizures in the setting of a severe diffuse/multifocal cerebral dysfunction which can be seen in the setting of sedative effect or due to an anoxic injury, with improvement after 20:50 suggesting a lowering of sedation"; will f/u with neuro recommendations. Repeat vEEG 3/24 showing severe cerebral dysfunction   -C/w levetiracetam 500 mg IV BID & Valium as above. Attempt to wean off propofol completely.      CV:  #Shock - hemorrhagic shock s/p kidney biopsy s/p 5u pRBC, 3 plasma, 3 plt  last dose of lovenox AC on 3/18 at 5PM   Hgb 8 -> ~3.  Current Hg = 5-6  - Not on levophed, MAP goal>65  - Monitor cbc q24  - Was on Midodrine 10mg TID & trend CBC daily for Hg goal. Given NPO with no NGT, can hold off on midodrine for now and monitor.   - Hg goal >6 per heme/onc  - GI consulted given downtrending Hgb, melanotic stool, and positive stool occult blood feces. S/p EGD with cauterization of gastric ulcer & hemostatic spraying of three duodenal ulcers. No NGT (until 48 hrs after EGD)!      #Hx of VTE (R IJ thrombus, L brachial DVT)  - CTM, hold AC given hemorrhage  - Bullae present on arm with DVT  - Appreciate orthopedics hand surgery consult, not compartment syndrome     PULM:  #Acute hypoxic respiratory failure  #Pna- 2/2 ventilation requirement  #Tracheostomy   CXR with R lung field and left mid and lower lung field hazy opacities.  - continue with duonebs Q6H & s/p HTS  - 3/28 CXR showing increasing RUL consolidation   -4/4 bedside tracheostomy   -S/p Abx completion course as below and currently on meropenem 500 mg Q24H (04/02 - 04/07)  -C/w trach care & wean vent settings as tolerated    RENAL:  #Acute renal failure.   #Renal mass s/p biopsy -   #Acidosis  Oliguric & receiving Bumex IVP PRN per nephro recs  Acute renal failure s/p cardiac arrest most likely 2/2 ATN requiring intermittent HD with nephro following  - TOV 3/28, failed; replaced daniel on 4/1; Attempt TOV when possible  - C/w Doxazosin when possible (hold for now)  - Trend Cr, UOP, lytes  - S/p bicarb drip completion  - Hold off sodium bicarbonate 650 mg po TID for now  - 3/31 removed L IJV shiley; 4/2 placed R fem shiley that was removed on 04/08  - Per pathology result of renal biopsy, noninvasive urothelial malignancy   - HD sessions per nephro. Removed R. femoral line (04/08) & placed L. IJ shiley with possible TDC placement later on by IR pending GOC discussions    GI:  #Shock liver  Unchanged  LFTs. Jaundice with hyperbilirubinemia (direct).   - Trend liver enzymes  - RUQ US showing enlarged periportal and periaortic enlarged lymph nodes and enlarged left lateral liver lobe and CBD 9 mm.     #Occult blood in feces  Positive Occult blood with dropping Hgb daily below goal of 6 ( Hx of SS). Patient with melena during hospital stay requiring pRBC to meet goal and with inappropriate Hgb response.  S/p 7 U of pRBCs since the beginning of April given downtrending Hgb  Maintain active T & S  C/w PPI 40 mg IV BID for now.  GI consulted given downtrending Hgb, melanotic stool, and positive stool occult blood feces. S/p EGD with cauterization of gastric ulcer & hemostatic spraying of three duodenal ulcers. No NGT (until 48 hrs after EGD).    #Diet:  - NPO & no NGT (Until 48 hrs after EGD). Hold all PO meds and seizure medications converted to IV.   - GI unable to place PEG due to hepatomegaly.   - Per IR consult: Given no safe window for endoscopic placement, recommend surgical consultation.   - Surgery consulted  - S/p EGD      HEMATOLOGIC/ONCOLGOGIC   #Sickle cell crisis--Resolved  #Acute blood loss anemia- 2/2 sickle cells and uremia vs critical illness vs GIB  Downtrending direct hyperbilirubinemia,  gradually & Increasing reticulocyte count.   - Heme following & recs appreciated  - D/c deferasirox due to current renal failure   - S/p 7 U of pRBC since beginning of April  - Occult blood positive. C/w PPI 40 mg IV BID for now.  - Transfuse as needed Hb >6 goal per Heme.  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 CXR showing increased RUL opacification.   - D/c  aztreonam 2000 mg IV qd ( started 3/22- 3/27)   - S/p vancomycin 1500 mg IV qd (4/2); 2nd dose 4/4, 3rd dose 4/5  - 4/2- CXR showing increasing RUL consolidation  - Completed 5d meropenem 500 mg IV qd (started 3/27-3/31); (4/2-4/7). Hold off Meropenem after 5 day course   - CTM resp status    MSK:  #infiltration  Infiltration of sodium bicarb into left arm, now with arm distension and bullae. Elevated uric acid with nodule suggestive of podagra however no signs of active gout flare.   -Appreciate orthopedic hand surgery, not compartment syndrome   - CTM    Lines:   XANDER espinoza (04/11)  RRoberta Rivera & RRoberta Martinez (03/25)    Ethics: Full Code   Pending: Discussion with family (Wednesday (04/16) @ 11 AM)  Palliative Team: Consulted   Assessment and Plan: 	  Patient is 44y Male with PMHx of sickle cell disease admitted for anemia concerning for sickle cell crisis s/p ureteroscopy w/ L kidney biopsy 3/19, post-operative course c/b acute hemorrhagic shock, PEA arrest with ROSC; currently with acute renal failure on HD and global anoxic brain injury and urothelial cancer; s/p tracheostomy and pending GOC discussions to decide on possible interventions and dispo. GI consulted for melena patient s/p EGD with cauterization of gastric ulcer & hemostatic spraying of three duodenal ulcers     NEURO:  #Global anoxic brain injury   AAOx0. On sedation with no further myoclonus. Guarded prognosis.   - Currently on propofol gtt. Attempt to wean off propofol. Since no NGT for now, c/w IVP Valium 10 mg Q4H standing and 10 mg Q4H PRN for myoclonus (switch to Valium 40 mg PO TID when possible).  - Repeat CT B 3/24 showing diffuse sulcal effacement with increased intracranial pressure, and few patchy foci of cortical blurring   - 3/25 MRIB with diffuse global abnormal supratentorial cortical restricted diffusion consistent with global hypoxic injury     #Seizures  No further myoclonus of upper body after restarting propofol.   - Witness myoclonic jerking concerning for seizures iso anoxic brain injury  - s/p 2.5g keppra load & midazolam 2mg IV q8 PRN   - vEEG report: "Abundant myoclonic seizures in the setting of a severe diffuse/multifocal cerebral dysfunction which can be seen in the setting of sedative effect or due to an anoxic injury, with improvement after 20:50 suggesting a lowering of sedation"; will f/u with neuro recommendations. Repeat vEEG 3/24 showing severe cerebral dysfunction   -C/w levetiracetam 500 mg IV BID & Valium as above. Attempt to wean off propofol completely.      CV:  #Shock - hemorrhagic shock s/p kidney biopsy s/p 5u pRBC, 3 plasma, 3 plt  last dose of lovenox AC on 3/18 at 5PM   Hgb 8 -> ~3.  Current Hg = 5-6  - Not on levophed, MAP goal>65  - Monitor cbc q24  - Was on Midodrine 10mg TID & trend CBC daily for Hg goal. Given NPO with no NGT, can hold off on midodrine for now and monitor.   - Hg goal >6 per heme/onc  - GI consulted given downtrending Hgb, melanotic stool, and positive stool occult blood feces. S/p EGD with cauterization of gastric ulcer & hemostatic spraying of three duodenal ulcers. No NGT (until 48 hrs after EGD)!      #Hx of VTE (R IJ thrombus, L brachial DVT)  - CTM, hold AC given hemorrhage  - Bullae present on arm with DVT  - Appreciate orthopedics hand surgery consult, not compartment syndrome     PULM:  #Acute hypoxic respiratory failure  #Pna- 2/2 ventilation requirement  #Tracheostomy   CXR with R lung field and left mid and lower lung field hazy opacities.  - continue with duonebs Q6H & s/p HTS  - 3/28 CXR showing increasing RUL consolidation   -4/4 bedside tracheostomy   -S/p Abx completion course as below and currently on meropenem 500 mg Q24H (04/02 - 04/07)  -C/w trach care & wean vent settings as tolerated    RENAL:  #Acute renal failure.   #Renal mass s/p biopsy -   #Acidosis  Oliguric & receiving Bumex IVP PRN per nephro recs  Acute renal failure s/p cardiac arrest most likely 2/2 ATN requiring intermittent HD with nephro following  - TOV 3/28, failed; replaced daniel on 4/1; Attempt TOV when possible  - C/w Doxazosin when possible (hold for now)  - Trend Cr, UOP, lytes  - S/p bicarb drip completion  - Hold off sodium bicarbonate 650 mg po TID for now  - 3/31 removed L IJV shiley; 4/2 placed R fem shiley that was removed on 04/08  - Per pathology result of renal biopsy, noninvasive urothelial malignancy   - HD sessions per nephro. Removed R. femoral line (04/08) & placed L. IJ shiley with possible TDC placement later on by IR pending GOC discussions    GI:  #Shock liver  Unchanged  LFTs. Jaundice with hyperbilirubinemia (direct).   - Trend liver enzymes  - RUQ US showing enlarged periportal and periaortic enlarged lymph nodes and enlarged left lateral liver lobe and CBD 9 mm.     #Occult blood in feces  Positive Occult blood with dropping Hgb daily below goal of 6 ( Hx of SS). Patient with melena during hospital stay requiring pRBC to meet goal and with inappropriate Hgb response.  S/p 9 U of pRBCs since the beginning of April given downtrending Hgb  Maintain active T & S  C/w PPI 40 mg IV BID for now.  GI consulted given downtrending Hgb, melanotic stool, and positive stool occult blood feces. S/p EGD with cauterization of gastric ulcer & hemostatic spraying of three duodenal ulcers. No NGT (until 48 hrs after EGD).    #Diet:  - NPO & no NGT (Until 48 hrs after EGD). Hold all PO meds and seizure medications converted to IV.   - GI unable to place PEG due to hepatomegaly.   - Per IR consult: Given no safe window for endoscopic placement, recommend surgical consultation.   - Surgery consulted  - S/p EGD      HEMATOLOGIC/ONCOLGOGIC   #Sickle cell crisis--Resolved  #Acute blood loss anemia- 2/2 sickle cells and uremia vs critical illness vs GIB  Downtrending direct hyperbilirubinemia,  gradually & Increasing reticulocyte count.   - Heme following & recs appreciated  - D/c deferasirox due to current renal failure   - S/p 9 U of pRBC since beginning of April  - Occult blood positive. C/w PPI 40 mg IV BID for now.  - Transfuse as needed Hb >6 goal per Heme.  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 CXR showing increased RUL opacification.   - D/c  aztreonam 2000 mg IV qd ( started 3/22- 3/27)   - S/p vancomycin 1500 mg IV qd (4/2); 2nd dose 4/4, 3rd dose 4/5  - 4/2- CXR showing increasing RUL consolidation  - Completed 5d meropenem 500 mg IV qd (started 3/27-3/31); (4/2-4/7). Hold off Meropenem after 5 day course   - CTM resp status    MSK:  #infiltration  Infiltration of sodium bicarb into left arm, now with arm distension and bullae. Elevated uric acid with nodule suggestive of podagra however no signs of active gout flare.   -Appreciate orthopedic hand surgery, not compartment syndrome   - CTM    Lines:   XANDER espinoza (04/11)  RRoberta Rivera & RRoberta Martinez (03/25)    Ethics: Full Code   Pending: Discussion with family (Wednesday (04/16) @ 11 AM)  Palliative Team: Consulted   Assessment and Plan: 	  Patient is 44y Male with PMHx of sickle cell disease admitted for anemia concerning for sickle cell crisis s/p ureteroscopy w/ L kidney biopsy 3/19, post-operative course c/b acute hemorrhagic shock, PEA arrest with ROSC; currently with acute renal failure on HD and global anoxic brain injury and urothelial cancer; s/p tracheostomy and pending GOC discussions to decide on possible interventions and dispo. GI consulted for melena patient s/p EGD with cauterization of gastric ulcer & hemostatic spraying of three duodenal ulcers     NEURO:  #Global anoxic brain injury   AAOx0. On sedation with no further myoclonus. Guarded prognosis.   - Currently on propofol gtt. Attempt to wean off propofol. Since no NGT for now, c/w IVP Valium 10 mg Q4H standing and 10 mg Q4H PRN for myoclonus (switch to Valium 40 mg PO TID when possible). Turn off Propofol off today.  - Repeat CT B 3/24 showing diffuse sulcal effacement with increased intracranial pressure, and few patchy foci of cortical blurring   - 3/25 MRIB with diffuse global abnormal supratentorial cortical restricted diffusion consistent with global hypoxic injury     #Seizures  No further myoclonus of upper body after restarting propofol.   - Witness myoclonic jerking concerning for seizures iso anoxic brain injury  - s/p 2.5g keppra load & midazolam 2mg IV q8 PRN   - vEEG report: "Abundant myoclonic seizures in the setting of a severe diffuse/multifocal cerebral dysfunction which can be seen in the setting of sedative effect or due to an anoxic injury, with improvement after 20:50 suggesting a lowering of sedation"; will f/u with neuro recommendations. Repeat vEEG 3/24 showing severe cerebral dysfunction   -C/w levetiracetam 500 mg IV BID & Valium as above. Attempt to wean off propofol completely today.      CV:  #Shock - hemorrhagic shock s/p kidney biopsy s/p 5u pRBC, 3 plasma, 3 plt  last dose of lovenox AC on 3/18 at 5PM   Hgb 8 -> ~3.  Current Hg = 5-6  - Not on levophed, MAP goal>65  - Monitor cbc q24  - Was on Midodrine 10mg TID & trend CBC daily for Hg goal. Given NPO with no NGT, can hold off on midodrine for now and monitor.   - Hg goal >6 per heme/onc  - GI consulted given downtrending Hgb, melanotic stool, and positive stool occult blood feces. S/p EGD with cauterization of gastric ulcer & hemostatic spraying of three duodenal ulcers. No NGT (until 48 hrs after EGD)!      #Hx of VTE (R IJ thrombus, L brachial DVT)  - CTM, hold AC given hemorrhage  - Bullae present on arm with DVT  - Appreciate orthopedics hand surgery consult, not compartment syndrome     PULM:  #Acute hypoxic respiratory failure  #Pna- 2/2 ventilation requirement  #Tracheostomy   CXR with R lung field and left mid and lower lung field hazy opacities.  - continue with duonebs Q6H & s/p HTS  - 3/28 CXR showing increasing RUL consolidation   -4/4 bedside tracheostomy   -S/p Abx completion course as below and currently on meropenem 500 mg Q24H (04/02 - 04/07)  -C/w trach care & wean vent settings as tolerated    RENAL:  #Acute renal failure.   #Renal mass s/p biopsy -   #Acidosis  Oliguric & receiving Bumex IVP PRN per nephro recs  Acute renal failure s/p cardiac arrest most likely 2/2 ATN requiring intermittent HD with nephro following  - TOV 3/28, failed; replaced daniel on 4/1; Attempt TOV when possible  - C/w Doxazosin when possible (hold for now)  - Trend Cr, UOP, lytes  - S/p bicarb drip completion  - Hold off sodium bicarbonate 650 mg po TID for now  - 3/31 removed L IJV shiley; 4/2 placed R fem shiley that was removed on 04/08  - Per pathology result of renal biopsy, noninvasive urothelial malignancy   - HD sessions per nephro. Removed R. femoral line (04/08) & placed L. IJ shiley with possible TDC placement later on by IR pending GOC discussions    GI:  #Shock liver  Unchanged  LFTs. Jaundice with hyperbilirubinemia (direct).   - Trend liver enzymes  - RUQ US showing enlarged periportal and periaortic enlarged lymph nodes and enlarged left lateral liver lobe and CBD 9 mm.     #Occult blood in feces  Positive Occult blood with dropping Hgb daily below goal of 6 ( Hx of SS). Patient with melena during hospital stay requiring pRBC to meet goal and with inappropriate Hgb response.  S/p 9 U of pRBCs since the beginning of April given downtrending Hgb  Maintain active T & S  C/w PPI 40 mg IV BID for now.  GI consulted given downtrending Hgb, melanotic stool, and positive stool occult blood feces. S/p EGD with cauterization of gastric ulcer & hemostatic spraying of three duodenal ulcers. No NGT (until 48 hrs after EGD).    #Diet:  - NPO & no NGT (Until 48 hrs after EGD). Hold all PO meds and seizure medications converted to IV.   - GI unable to place PEG due to hepatomegaly.   - Per IR consult: Given no safe window for endoscopic placement, recommend surgical consultation.   - Surgery consulted and pending C discussion  - S/p EGD with no NGT. C/w Maintenance IVFs while NPO.       HEMATOLOGIC/ONCOLGOGIC   #Sickle cell crisis--Resolved  #Acute blood loss anemia- 2/2 sickle cells and uremia vs critical illness vs GIB  Downtrending direct hyperbilirubinemia,  gradually & Increasing reticulocyte count.   - Heme following & recs appreciated  - D/c deferasirox due to current renal failure   - S/p 9 U of pRBC since beginning of April  - Occult blood positive. C/w PPI 40 mg IV BID for now.  - Transfuse as needed Hb >6 goal per Heme.  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 CXR showing increased RUL opacification.   - D/c  aztreonam 2000 mg IV qd ( started 3/22- 3/27)   - S/p vancomycin 1500 mg IV qd (4/2); 2nd dose 4/4, 3rd dose 4/5  - 4/2- CXR showing increasing RUL consolidation  - Completed 5d meropenem 500 mg IV qd (started 3/27-3/31); (4/2-4/7). Hold off Meropenem after 5 day course   - CTM resp status    #Temperature  -Patient around 100-100.6 F last few days, but spiked fever of 38.6 C. R. Fem shiley removed on 04/08 and L. KAYLA Self placed on (04/11). Patient currently afebrile, but if spikes fever again (>100.8 F) would reculture (blood + infectious) and remove daniel.    MSK:  #infiltration  Infiltration of sodium bicarb into left arm, now with arm distension and bullae. Elevated uric acid with nodule suggestive of podagra however no signs of active gout flare.   -Appreciate orthopedic hand surgery, not compartment syndrome   - CTM    Lines:   XANDER self (04/11)  R. Midline & R. Accucath (03/25)    Ethics: Full Code   Pending: Discussion with family (Wednesday (04/16) @ 11 AM)  Palliative Team: Consulted   Assessment and Plan: 	  Patient is 44y Male with PMHx of sickle cell disease admitted for anemia concerning for sickle cell crisis s/p ureteroscopy w/ L kidney biopsy 3/19, post-operative course c/b acute hemorrhagic shock, PEA arrest with ROSC; currently with acute renal failure on HD and global anoxic brain injury and urothelial cancer; s/p tracheostomy and pending GOC discussions to decide on possible interventions and dispo. GI consulted for melena patient s/p EGD with cauterization of gastric ulcer & hemostatic spraying of three duodenal ulcers     NEURO:  #Global anoxic brain injury   AAOx0. On sedation with no further myoclonus. Guarded prognosis.   - Currently on propofol gtt. Attempt to wean off propofol. Since no NGT for now, c/w IVP Valium 10 mg Q4H standing and 10 mg Q4H PRN for myoclonus (switch to Valium 40 mg PO TID when possible). Turn off Propofol off today.  - Repeat CT B 3/24 showing diffuse sulcal effacement with increased intracranial pressure, and few patchy foci of cortical blurring   - 3/25 MRIB with diffuse global abnormal supratentorial cortical restricted diffusion consistent with global hypoxic injury     #Seizures  No further myoclonus of upper body after restarting propofol.   - Witness myoclonic jerking concerning for seizures iso anoxic brain injury  - s/p 2.5g keppra load & midazolam 2mg IV q8 PRN   - vEEG report: "Abundant myoclonic seizures in the setting of a severe diffuse/multifocal cerebral dysfunction which can be seen in the setting of sedative effect or due to an anoxic injury, with improvement after 20:50 suggesting a lowering of sedation"; will f/u with neuro recommendations. Repeat vEEG 3/24 showing severe cerebral dysfunction   -C/w levetiracetam 500 mg IV BID & Valium as above. Attempt to wean off propofol completely today.      CV:  #Shock - hemorrhagic shock s/p kidney biopsy s/p 5u pRBC, 3 plasma, 3 plt  last dose of lovenox AC on 3/18 at 5PM   Hgb 8 -> ~3.  Current Hg = 5-6  - Not on levophed, MAP goal>65  - Monitor cbc q24  - Was on Midodrine 10mg TID & trend CBC daily for Hg goal. Given NPO with no NGT, can hold off on midodrine for now and monitor.   - Hg goal >6 per heme/onc  - GI consulted given downtrending Hgb, melanotic stool, and positive stool occult blood feces. S/p EGD with cauterization of gastric ulcer & hemostatic spraying of three duodenal ulcers. No NGT (until 48 hrs after EGD)!      #Hx of VTE (R IJ thrombus, L brachial DVT)  - CTM, hold AC given hemorrhage  - Bullae present on arm with DVT  - Appreciate orthopedics hand surgery consult, not compartment syndrome     PULM:  #Acute hypoxic respiratory failure  #Pna- 2/2 ventilation requirement  #Tracheostomy   CXR with R lung field and left mid and lower lung field hazy opacities.  - continue with duonebs Q6H & s/p HTS  - 3/28 CXR showing increasing RUL consolidation   -4/4 bedside tracheostomy   -S/p Abx completion course as below and currently on meropenem 500 mg Q24H (04/02 - 04/07)  -C/w trach care & wean vent settings as tolerated    RENAL:  #Acute renal failure.   #Renal mass s/p biopsy -   #Acidosis  Oliguric & receiving Bumex IVP PRN per nephro recs  Acute renal failure s/p cardiac arrest most likely 2/2 ATN requiring intermittent HD with nephro following  - TOV 3/28, failed; replaced daniel on 4/1; Attempt TOV when possible  - C/w Doxazosin when possible (hold for now)  - Trend Cr, UOP, lytes  - S/p bicarb drip completion  - Hold off sodium bicarbonate 650 mg po TID for now  - 3/31 removed L IJV shiley; 4/2 placed R fem shiley that was removed on 04/08  - Per pathology result of renal biopsy, noninvasive urothelial malignancy   - HD sessions per nephro. Removed R. femoral line (04/08) & placed L. IJ shiley with possible TDC placement later on by IR pending GOC discussions    GI:  #Shock liver  Unchanged  LFTs. Jaundice with hyperbilirubinemia (direct).   - Trend liver enzymes  - RUQ US showing enlarged periportal and periaortic enlarged lymph nodes and enlarged left lateral liver lobe and CBD 9 mm.     #Occult blood in feces  Positive Occult blood with dropping Hgb daily below goal of 6 ( Hx of SS). Patient with melena during hospital stay requiring pRBC to meet goal and with inappropriate Hgb response.  S/p 9 U of pRBCs since the beginning of April given downtrending Hgb  Maintain active T & S  C/w PPI 40 mg IV BID for now.  GI consulted given downtrending Hgb, melanotic stool, and positive stool occult blood feces. S/p EGD with cauterization of gastric ulcer & hemostatic spraying of three duodenal ulcers. No NGT (until 48 hrs after EGD).    #Diet:  - NPO & no NGT (Until 48 hrs after EGD). Hold all PO meds and seizure medications converted to IV.   - GI unable to place PEG due to hepatomegaly.   - Per IR consult: Given no safe window for endoscopic placement, recommend surgical consultation.   - Surgery consulted and pending C discussion  - S/p EGD with no NGT. C/w Maintenance IVFs while NPO.       HEMATOLOGIC/ONCOLGOGIC   #Sickle cell crisis--Resolved  #Acute blood loss anemia- 2/2 sickle cells and uremia vs critical illness vs GIB  Downtrending direct hyperbilirubinemia,  gradually & Increasing reticulocyte count.   - Heme following & recs appreciated  - D/c deferasirox due to current renal failure   - S/p 9 U of pRBC since beginning of April  - Occult blood positive. C/w PPI 40 mg IV BID for now.  - Transfuse as needed Hb >6 goal per Heme.  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 CXR showing increased RUL opacification.   - D/c  aztreonam 2000 mg IV qd ( started 3/22- 3/27)   - S/p vancomycin 1500 mg IV qd (4/2); 2nd dose 4/4, 3rd dose 4/5  - 4/2- CXR showing increasing RUL consolidation  - Completed 5d meropenem 500 mg IV qd (started 3/27-3/31); (4/2-4/7). Hold off Meropenem after 5 day course   - CTM resp status    #Temperature  -Patient around 100-100.6 F last few days, but spiked fever of 38.6 C. R. Fem shiley removed on 04/08 and L. KAYLA Self placed on (04/11). Patient currently afebrile, but if spikes fever again (>100.8 F) would reculture (blood + urine) and remove daniel.    MSK:  #infiltration  Infiltration of sodium bicarb into left arm, now with arm distension and bullae. Elevated uric acid with nodule suggestive of podagra however no signs of active gout flare.   -Appreciate orthopedic hand surgery, not compartment syndrome   - CTM    Lines:   XANDER self (04/11)  R. Midline & R. Accucath (03/25)    Ethics: Full Code   Pending: Discussion with family (Wednesday (04/16) @ 11 AM)  Palliative Team: Consulted

## 2025-04-12 NOTE — PROGRESS NOTE ADULT - ATTENDING COMMENTS
43 yo man PMHx sickle cell disease, RUE DVT, renal lesion, admitted 3/15 with sickle cell crisis, s/p L ureteroscopy w/biopsy and stent placement on 3/19 for renal lesion (bx shows urothelial cancer), hospital course c/b PEA arrest on 3/20 (ROSC 4 min) likely 2/2 hypoglycemia resulting in anoxic brain injury s/p trach on 4/4, hemorrhagic shock 3/20 s/p IR guided embolization, ARF likely 2/2 ATN on HD since 3/24, and most recently acute GIB s/p EGD on 4/11 demonstrating gastric ulcer likely 2/2 NGT trauma.     Myoclonic jerks- likely 2/2 anoxic brain injury as sequelae of PEA arrest on 3/20- wean off prop today. Continue valium 10 mg q 4 hours and PRN. Continue keppra.     S/p trach placed 4/4 for airway protection in the setting of anoxic brain injury- 7 Portex.  Continue PS 15/6 today and wean as tolerated.     Acute on chronic anemia- GIB 2/2 gastric ulcer likely d/t NGT trauma- s/p EGD on 4/11 with injection/cauterization/spray and so far Hgb has been stable. Continue protonix IV BID. NPO, no NGT placement until at least 4/14. Eventually will need surgery to weigh in on PEG pending GOC. Transfuse if Hgb <6.     MATA likely 2/2 ATN- Continue HD via LIJ Shiley placed 4/11. With UOP however BUN> 100- likely will need TDC pending GOC. Appreciate Nephrology recs.    Febrile to 101 on 4/11- Most recently s/p 5 d course of meropenem for VAP. Trend fever curve. If re-spikes >101, remove daniel, send off blood/urine/sputum cultures.     RUE DVT- observe off AC given recent hx of GIB/hemorrhagic shock     Renal lesion- bx proven urothelial carcinoma.     Full Code- Family meeting for GOC scheduled Wed at 11 AM  SCD  NPO/ no NGT

## 2025-04-13 LAB
ALBUMIN SERPL ELPH-MCNC: 2.5 G/DL — LOW (ref 3.3–5)
ALP SERPL-CCNC: 172 U/L — HIGH (ref 40–120)
ALT FLD-CCNC: 31 U/L — SIGNIFICANT CHANGE UP (ref 4–41)
ANION GAP SERPL CALC-SCNC: 19 MMOL/L — HIGH (ref 7–14)
APTT BLD: 25.8 SEC — SIGNIFICANT CHANGE UP (ref 24.5–35.6)
AST SERPL-CCNC: 149 U/L — HIGH (ref 4–40)
BASOPHILS # BLD AUTO: 0.1 K/UL — SIGNIFICANT CHANGE UP (ref 0–0.2)
BASOPHILS NFR BLD AUTO: 0.6 % — SIGNIFICANT CHANGE UP (ref 0–2)
BILIRUB DIRECT SERPL-MCNC: 6.2 MG/DL — HIGH (ref 0–0.3)
BILIRUB INDIRECT FLD-MCNC: 1.4 MG/DL — HIGH (ref 0–1)
BILIRUB SERPL-MCNC: 7.6 MG/DL — HIGH (ref 0.2–1.2)
BILIRUB SERPL-MCNC: 7.6 MG/DL — HIGH (ref 0.2–1.2)
BUN SERPL-MCNC: 61 MG/DL — HIGH (ref 7–23)
CA-I BLD-SCNC: 1.13 MMOL/L — LOW (ref 1.15–1.29)
CALCIUM SERPL-MCNC: 8.8 MG/DL — SIGNIFICANT CHANGE UP (ref 8.4–10.5)
CHLORIDE SERPL-SCNC: 99 MMOL/L — SIGNIFICANT CHANGE UP (ref 98–107)
CK SERPL-CCNC: 30 U/L — SIGNIFICANT CHANGE UP (ref 30–200)
CO2 SERPL-SCNC: 21 MMOL/L — LOW (ref 22–31)
CREAT SERPL-MCNC: 2.86 MG/DL — HIGH (ref 0.5–1.3)
CULTURE RESULTS: SIGNIFICANT CHANGE UP
EGFR: 27 ML/MIN/1.73M2 — LOW
EGFR: 27 ML/MIN/1.73M2 — LOW
EOSINOPHIL # BLD AUTO: 2.08 K/UL — HIGH (ref 0–0.5)
EOSINOPHIL NFR BLD AUTO: 12.6 % — HIGH (ref 0–6)
GAS PNL BLDV: SIGNIFICANT CHANGE UP
GLUCOSE SERPL-MCNC: 104 MG/DL — HIGH (ref 70–99)
HAPTOGLOB SERPL-MCNC: <20 MG/DL — LOW (ref 34–200)
HCT VFR BLD CALC: 17.7 % — CRITICAL LOW (ref 39–50)
HGB BLD-MCNC: 6.3 G/DL — CRITICAL LOW (ref 13–17)
IANC: 10.58 K/UL — HIGH (ref 1.8–7.4)
IMM GRANULOCYTES NFR BLD AUTO: 1 % — HIGH (ref 0–0.9)
INR BLD: 1.08 RATIO — SIGNIFICANT CHANGE UP (ref 0.85–1.16)
LDH SERPL L TO P-CCNC: 457 U/L — HIGH (ref 135–225)
LYMPHOCYTES # BLD AUTO: 12.7 % — LOW (ref 13–44)
LYMPHOCYTES # BLD AUTO: 2.1 K/UL — SIGNIFICANT CHANGE UP (ref 1–3.3)
MAGNESIUM SERPL-MCNC: 1.8 MG/DL — SIGNIFICANT CHANGE UP (ref 1.6–2.6)
MCHC RBC-ENTMCNC: 31.2 PG — SIGNIFICANT CHANGE UP (ref 27–34)
MCHC RBC-ENTMCNC: 35.6 G/DL — SIGNIFICANT CHANGE UP (ref 32–36)
MCV RBC AUTO: 87.6 FL — SIGNIFICANT CHANGE UP (ref 80–100)
MONOCYTES # BLD AUTO: 1.49 K/UL — HIGH (ref 0–0.9)
MONOCYTES NFR BLD AUTO: 9 % — SIGNIFICANT CHANGE UP (ref 2–14)
NEUTROPHILS # BLD AUTO: 10.58 K/UL — HIGH (ref 1.8–7.4)
NEUTROPHILS NFR BLD AUTO: 64.1 % — SIGNIFICANT CHANGE UP (ref 43–77)
NRBC # BLD AUTO: 0.08 K/UL — HIGH (ref 0–0)
NRBC # FLD: 0.08 K/UL — HIGH (ref 0–0)
NRBC BLD AUTO-RTO: 0 /100 WBCS — SIGNIFICANT CHANGE UP (ref 0–0)
PHOSPHATE SERPL-MCNC: 6.5 MG/DL — HIGH (ref 2.5–4.5)
PLATELET # BLD AUTO: 232 K/UL — SIGNIFICANT CHANGE UP (ref 150–400)
POTASSIUM SERPL-MCNC: 3.4 MMOL/L — LOW (ref 3.5–5.3)
POTASSIUM SERPL-SCNC: 3.4 MMOL/L — LOW (ref 3.5–5.3)
PROT SERPL-MCNC: 6.5 G/DL — SIGNIFICANT CHANGE UP (ref 6–8.3)
PROTHROM AB SERPL-ACNC: 12.9 SEC — SIGNIFICANT CHANGE UP (ref 9.9–13.4)
RBC # BLD: 2.02 M/UL — LOW (ref 4.2–5.8)
RBC # BLD: 2.02 M/UL — LOW (ref 4.2–5.8)
RBC # FLD: 19.2 % — HIGH (ref 10.3–14.5)
RETICS #: 161 K/UL — HIGH (ref 25–125)
RETICS/RBC NFR: 7.9 % — HIGH (ref 0.5–2.5)
SODIUM SERPL-SCNC: 139 MMOL/L — SIGNIFICANT CHANGE UP (ref 135–145)
SPECIMEN SOURCE: SIGNIFICANT CHANGE UP
URATE SERPL-MCNC: 8.7 MG/DL — SIGNIFICANT CHANGE UP (ref 3.4–8.8)
WBC # BLD: 16.51 K/UL — HIGH (ref 3.8–10.5)
WBC # FLD AUTO: 16.51 K/UL — HIGH (ref 3.8–10.5)

## 2025-04-13 PROCEDURE — 99232 SBSQ HOSP IP/OBS MODERATE 35: CPT | Mod: GC

## 2025-04-13 RX ORDER — ACETAMINOPHEN 500 MG/5ML
1000 LIQUID (ML) ORAL ONCE
Refills: 0 | Status: COMPLETED | OUTPATIENT
Start: 2025-04-13 | End: 2025-04-13

## 2025-04-13 RX ORDER — MAGNESIUM SULFATE 500 MG/ML
1 SYRINGE (ML) INJECTION ONCE
Refills: 0 | Status: COMPLETED | OUTPATIENT
Start: 2025-04-13 | End: 2025-04-13

## 2025-04-13 RX ORDER — HYDROMORPHONE/SOD CHLOR,ISO/PF 2 MG/10 ML
2 SYRINGE (ML) INJECTION ONCE
Refills: 0 | Status: DISCONTINUED | OUTPATIENT
Start: 2025-04-13 | End: 2025-04-13

## 2025-04-13 RX ORDER — DIAZEPAM 2 MG/1
10 TABLET ORAL EVERY 4 HOURS
Refills: 0 | Status: DISCONTINUED | OUTPATIENT
Start: 2025-04-13 | End: 2025-04-18

## 2025-04-13 RX ORDER — SODIUM CHLORIDE 9 G/1000ML
1000 INJECTION, SOLUTION INTRAVENOUS
Refills: 0 | Status: DISCONTINUED | OUTPATIENT
Start: 2025-04-13 | End: 2025-04-16

## 2025-04-13 RX ADMIN — Medication 40 MILLIGRAM(S): at 17:07

## 2025-04-13 RX ADMIN — DIAZEPAM 10 MILLIGRAM(S): 2 TABLET ORAL at 17:23

## 2025-04-13 RX ADMIN — Medication 1 APPLICATION(S): at 05:22

## 2025-04-13 RX ADMIN — Medication 2 MILLIGRAM(S): at 21:40

## 2025-04-13 RX ADMIN — DIAZEPAM 10 MILLIGRAM(S): 2 TABLET ORAL at 22:25

## 2025-04-13 RX ADMIN — Medication 15 MILLILITER(S): at 05:21

## 2025-04-13 RX ADMIN — DIAZEPAM 10 MILLIGRAM(S): 2 TABLET ORAL at 10:49

## 2025-04-13 RX ADMIN — DIAZEPAM 10 MILLIGRAM(S): 2 TABLET ORAL at 02:22

## 2025-04-13 RX ADMIN — IPRATROPIUM BROMIDE AND ALBUTEROL SULFATE 3 MILLILITER(S): .5; 2.5 SOLUTION RESPIRATORY (INHALATION) at 21:10

## 2025-04-13 RX ADMIN — Medication 400 MILLIGRAM(S): at 17:06

## 2025-04-13 RX ADMIN — SODIUM CHLORIDE 50 MILLILITER(S): 9 INJECTION, SOLUTION INTRAVENOUS at 02:23

## 2025-04-13 RX ADMIN — DIAZEPAM 10 MILLIGRAM(S): 2 TABLET ORAL at 05:21

## 2025-04-13 RX ADMIN — LEVETIRACETAM 500 MILLIGRAM(S): 10 INJECTION, SOLUTION INTRAVENOUS at 17:07

## 2025-04-13 RX ADMIN — LEVETIRACETAM 500 MILLIGRAM(S): 10 INJECTION, SOLUTION INTRAVENOUS at 05:20

## 2025-04-13 RX ADMIN — SODIUM CHLORIDE 60 MILLILITER(S): 9 INJECTION, SOLUTION INTRAVENOUS at 10:49

## 2025-04-13 RX ADMIN — Medication 1 APPLICATION(S): at 12:06

## 2025-04-13 RX ADMIN — Medication 40 MILLIGRAM(S): at 05:21

## 2025-04-13 RX ADMIN — Medication 1 APPLICATION(S): at 17:07

## 2025-04-13 RX ADMIN — Medication 15 MILLILITER(S): at 17:07

## 2025-04-13 RX ADMIN — IPRATROPIUM BROMIDE AND ALBUTEROL SULFATE 3 MILLILITER(S): .5; 2.5 SOLUTION RESPIRATORY (INHALATION) at 03:30

## 2025-04-13 RX ADMIN — Medication 2 MILLIGRAM(S): at 21:55

## 2025-04-13 RX ADMIN — Medication 100 MILLIEQUIVALENT(S): at 02:22

## 2025-04-13 RX ADMIN — Medication 100 GRAM(S): at 02:21

## 2025-04-13 RX ADMIN — DIAZEPAM 10 MILLIGRAM(S): 2 TABLET ORAL at 14:14

## 2025-04-13 RX ADMIN — IPRATROPIUM BROMIDE AND ALBUTEROL SULFATE 3 MILLILITER(S): .5; 2.5 SOLUTION RESPIRATORY (INHALATION) at 09:05

## 2025-04-13 RX ADMIN — IPRATROPIUM BROMIDE AND ALBUTEROL SULFATE 3 MILLILITER(S): .5; 2.5 SOLUTION RESPIRATORY (INHALATION) at 15:05

## 2025-04-13 NOTE — PROGRESS NOTE ADULT - ATTENDING COMMENTS
43 yo man PMHx sickle cell disease, RUE DVT, renal lesion, admitted 3/15 with sickle cell crisis, s/p L ureteroscopy w/biopsy and stent placement on 3/19 for renal lesion (bx shows urothelial cancer), hospital course c/b PEA arrest on 3/20 (ROSC 4 min) likely 2/2 hypoglycemia resulting in anoxic brain injury s/p trach on 4/4, hemorrhagic shock 3/20 s/p IR guided embolization, ARF likely 2/2 ATN on HD since 3/24, and most recently acute GIB s/p EGD on 4/11 demonstrating gastric ulcer likely 2/2 NGT trauma.     Myoclonic jerks likely 2/2 anoxic brain injury as sequelae of PEA arrest on 3/2- controlled on valium 10 mg q 4 hours and PRN and keppra. Off propofol since 4/12.     S/p trach placed 4/4 for airway protection in the setting of anoxic brain injury- 7 Portex.  Continue PS 15/6 today and if tolerated, continue overnight.     Acute on chronic anemia- GIB 2/2 gastric ulcer likely d/t NGT trauma- s/p EGD on 4/11 with injection/cauterization/spray and so far Hgb has been stable. Continue protonix IV BID. NPO, no NGT placement until at least 4/14. Eventually will need surgery to weigh in on PEG pending GOC. Transfuse if Hgb <6.     MATA likely 2/2 ATN- Continue HD via LIJ Shiley placed 4/11. With UOP however BUN> 100- likely will need TDC pending GOC. Appreciate Nephrology recs.    Febrile to 101 on 4/11- so far has remained afebrile. Observe off abx. Most recently s/p 5 d course of meropenem for VAP on 4/7.     RUE DVT- observe off AC given recent hx of GIB/hemorrhagic shock     Renal lesion- bx proven urothelial carcinoma.     Full Code- Family meeting for GOC scheduled Wed at 11 AM  SCD  NPO/ no NGT

## 2025-04-13 NOTE — PROGRESS NOTE ADULT - PROBLEM SELECTOR PLAN 1
Pt. with MATA in setting of hemorrhagic shock and PEA arrest, likely hemodynamically mediated ATN. SCr on admission was 1.25 (3/15/25) with progressive worsening to 6.04 (3/24/25). UA (3/16/25) w/ proteinuria and hematuria (21 RBC). Renal US (3/20/25) without hydronephrosis, and with large left renal subcapsular hematoma.   Pt. was initiated on iHD 3/24/25-3/29/25 in setting of oliguric kidney failure, hyperkalemia and hypervolemia after failing diuretic challenge. Pt. restarted on iHD 4/4/25-4/11/25 for metabolic acidosis and uremia. SCr off of iHD is 2.86 (4/13/25). Pt. is currently non-oliguric with 1.12L UOP in 24 hours. Pt. is hemodynamically stable off of pressor support. Labs reviewed, potassium is WNL. No urgent indication of iHD. Will reassess daily. Monitor labs and I/Os. Avoid nephrotoxins including, NSAIDs, contrast, etc. Dose medications as per eGFR.      If you have any questions, please feel free to contact me:  Norma Segovia MD PGY-4  Nephrology Fellow  [employer] Teams (Preferred)/ Pager [phone]   (After 5pm or on weekends please page the on-call fellow) Pt. with MATA in setting of hemorrhagic shock and PEA arrest. Pt. with most likely ATN. Scr on admission was 1.25 (3/15/25). Scr progressively worsened to 6.04 on 3/24/25. Renal US done on 3/20/25 showed large left renal subcapsular hematoma. Pt. received HD from 3/24/25 to 3/29/25 for oliguric kidney failure. Pt. restarted on HD 4/4/25 for metabolic acidosis and uremia. Last HD treatment was on 4/11/25. Labs from today reviewed, serum potassium WNL. Pt. currently non-oliguric with ~1.12L of UOP in last 24 hours. No plan for HD today. Will assess need for HD daily. Monitor labs and UOP. Avoid nephrotoxins. Dose medications as per eGFR.      If you have any questions, please feel free to contact me:  Norma Segovia MD PGY-4  Nephrology Fellow  [employer] Teams (Preferred)/ Pager [phone]   (After 5pm or on weekends please page the on-call fellow)

## 2025-04-13 NOTE — PROGRESS NOTE ADULT - SUBJECTIVE AND OBJECTIVE BOX
Medicine Progress Note  --------------------  Chayito Guillory M.D.  Internal Medicine  PGY-1  --------------------      Patient: TOLU VARGAS, MRN: [id number], : 1980  Admitted on 03-15-25 for Anemia      LANGUAGE - English            ------------------------------------------------------------------------------------------------------------  SUMMARY (from last progress note through 25 @ 07:37): Off propofol, no myoclonus noted  ------------------------------------------------------------------------------------------------------------  OVERNIGHT EVENTS  NAEON    SUBJECTIVE  - Intubated, unresponsive      ROS negative unless noted above.  ------------------------------------------------------------------------------------------------------------  OBJECTIVE:  Physical Exam  CONST:     NAD, well-appearing; well-developed; appears stated age  EYES:         Conjunctiva clear; PERRL; no conjunctival pallor; no lid lag  ENMT:       MMM, no pharyngeal injection or exudates; normal dentition/dentures present  NECK:        Supple, no LAD; no palpable masses; no thyromegaly  RESP :        Normal respiratory effort; CTA bilaterally; no W/R/R  CHEST:       No TTP, no lines/ports; symmetric chest expansion  CARDIO:     RRR, normal S1 and S2, no M/R/G; apical impulse at MCL  VASC:         No JVD/AJR, no peripheral edema, pulses 2+ B/L, Cap refill <2s  ABD:           Soft, NT/ND, norm bowel sounds; no R/G; No HSM; No CVAT  MSK:          No clubbing of digits; no joint swelling or TTP  EXT:           WWP, no reduction in body hair, no LE skin changes  PSYCH        A+Ox0  NEURO:     No response  SKIN:          No rashes; no palpable lesions; axillae not dry    Vital Signs Last 24 Hrs  T(F): 98.8, Max: 99.7 (25 @ 20:00)  HR: 107 (79 - 110)  BP: 128/83 (112/72 - 147/86)  RR: 23 (16 - 30)  SpO2: 100% (95% - 100%)        DAILY MEASUREMENTS:  I&O's Summary    2025 07:01  -  2025 07:00  --------------------------------------------------------  IN: 1215.9 mL / OUT: 1325 mL / NET: -109.1 mL      Daily     Daily Weight in k.6 (2025 04:00)    Orthostatic VS        LABS:                        6.3    16.51 )-----------( 232      ( 2025 00:15 )             17.7     Hgb Trend: 6.3<--, 6.6<--, 5.5<--, 5.3<--, 5.4<--  04-13    139  |  99  |  61[H]  ----------------------------<  104[H]  3.4[L]   |  21[L]  |  2.86[H]    Ca    8.8      2025 00:15  Phos  6.5       Mg     1.80         TPro  6.5  /  Alb  2.5[L]  /  TBili  7.6[H]  /  DBili  6.2[H]  /  AST  149[H]  /  ALT  31  /  AlkPhos  172[H]      PT/INR - ( 2025 00:15 )   PT: 12.9 sec;   INR: 1.08 ratio         PTT - ( 2025 00:15 )  PTT:25.8 sec  CAPILLARY BLOOD GLUCOSE            Urinalysis Basic - ( 2025 00:15 )    Color: x / Appearance: x / SG: x / pH: x  Gluc: 104 mg/dL / Ketone: x  / Bili: x / Urobili: x   Blood: x / Protein: x / Nitrite: x   Leuk Esterase: x / RBC: x / WBC x   Sq Epi: x / Non Sq Epi: x / Bacteria: x          Venous Blood Gas:  25 @ 00:15  7.46/35/64/25/95.2  VBG Lactate: 1.2        ------------------------------------------------------------------------------------------------------------  MEDICATIONS  (STANDING):  albuterol/ipratropium for Nebulization 3 milliLiter(s) Nebulizer every 6 hours  chlorhexidine 0.12% Liquid 15 milliLiter(s) Oral Mucosa every 12 hours  chlorhexidine 2% Cloths 1 Application(s) Topical daily  dextrose 5% + lactated ringers. 1000 milliLiter(s) (50 mL/Hr) IV Continuous <Continuous>  diazepam  Injectable 10 milliGRAM(s) IV Push every 4 hours  influenza   Vaccine 0.5 milliLiter(s) IntraMuscular once  levETIRAcetam   Injectable 500 milliGRAM(s) IV Push every 12 hours  pantoprazole  Injectable 40 milliGRAM(s) IV Push every 12 hours  petrolatum Ophthalmic Ointment 1 Application(s) Both EYES two times a day    MEDICATIONS  (PRN):  diazepam  Injectable 10 milliGRAM(s) IV Push every 4 hours PRN for myoclonus  sodium chloride 0.9% lock flush 10 milliLiter(s) IV Push every 1 hour PRN Pre/post blood products, medications, blood draw, and to maintain line patency    ------------------------------------------------------------------------------------------------------------  COORDINATION OF CARE:  Care discussed with consultants/other providers and notes reviewed [Y]

## 2025-04-13 NOTE — PROGRESS NOTE ADULT - ATTENDING COMMENTS
Pt. with MATA. Pt. received HD treatment on 4/11/25. Pt. currently non-oliguric. Assessment and plan for MATA/HD as outlined above. No plan for HD today. Will assess need for HD daily. Monitor labs and urine output. Avoid any potential nephrotoxins. Dose medications as per eGFR.

## 2025-04-13 NOTE — PROGRESS NOTE ADULT - ASSESSMENT
Assessment and Plan: 	  Patient is 44y Male with PMHx of sickle cell disease admitted for anemia concerning for sickle cell crisis s/p ureteroscopy w/ L kidney biopsy 3/19, post-operative course c/b acute hemorrhagic shock, PEA arrest with ROSC; currently with acute renal failure on HD and global anoxic brain injury and urothelial cancer; s/p tracheostomy and pending GOC discussions to decide on possible interventions and dispo. GI consulted for melena patient s/p EGD with cauterization of gastric ulcer & hemostatic spraying of three duodenal ulcers     NEURO:  #Global anoxic brain injury   AAOx0.  Guarded prognosis.   - Since no NGT for now, c/w IVP Valium 10 mg Q4H standing and 10 mg Q4H PRN for myoclonus (switch to Valium 40 mg PO TID when possible).   - Repeat CT B 3/24 showing diffuse sulcal effacement with increased intracranial pressure, and few patchy foci of cortical blurring   - 3/25 MRIB with diffuse global abnormal supratentorial cortical restricted diffusion consistent with global hypoxic injury     #Seizures  No further myoclonus of upper body off propofol  - Witness myoclonic jerking concerning for seizures iso anoxic brain injury  - s/p 2.5g keppra load & midazolam 2mg IV q8 PRN   - vEEG report: "Abundant myoclonic seizures in the setting of a severe diffuse/multifocal cerebral dysfunction which can be seen in the setting of sedative effect or due to an anoxic injury, with improvement after 20:50 suggesting a lowering of sedation"; will f/u with neuro recommendations. Repeat vEEG 3/24 showing severe cerebral dysfunction   -C/w levetiracetam 500 mg IV BID & Valium as above.       CV:  #Shock - hemorrhagic shock s/p kidney biopsy s/p 5u pRBC, 3 plasma, 3 plt  last dose of lovenox AC on 3/18 at 5PM   Hgb 8 -> ~3.  Current Hg = 5-6  - Monitor cbc q24  - Was on Midodrine 10mg TID & trend CBC daily for Hg goal. Given NPO with no NGT, can hold off on midodrine for now and monitor.   - Hg goal >6 per heme/onc  - GI consulted given downtrending Hgb, melanotic stool, and positive stool occult blood feces. S/p EGD with cauterization of gastric ulcer & hemostatic spraying of three duodenal ulcers. No NGT (until 48 hrs after EGD)!      #Hx of VTE (R IJ thrombus, L brachial DVT)  - CTM, hold AC given hemorrhage  - Bullae present on arm with DVT  - Appreciate orthopedics hand surgery consult, not compartment syndrome     PULM:  #Acute hypoxic respiratory failure  #Pna- 2/2 ventilation requirement  #Tracheostomy   CXR with R lung field and left mid and lower lung field hazy opacities.  - continue with duonebs Q6H & s/p HTS  - 3/28 CXR showing increasing RUL consolidation   -4/4 bedside tracheostomy   -S/p Abx completion course as below and currently on meropenem 500 mg Q24H (04/02 - 04/07)  -C/w trach care & wean vent settings as tolerated    RENAL:  #Acute renal failure.   #Renal mass s/p biopsy -   #Acidosis  Oliguric & receiving Bumex IVP PRN per nephro recs  Acute renal failure s/p cardiac arrest most likely 2/2 ATN requiring intermittent HD with nephro following  - TOV 3/28, failed; replaced daniel on 4/1; Attempt TOV when possible  - C/w Doxazosin when possible (hold for now)  - Trend Cr, UOP, lytes  - S/p bicarb drip completion  - Hold off sodium bicarbonate 650 mg po TID for now  - 3/31 removed L IJV shiley; 4/2 placed R fem shiley that was removed on 04/08  - Per pathology result of renal biopsy, noninvasive urothelial malignancy   - HD sessions per nephro. Removed R. femoral line (04/08) & placed L. IJ shiley with possible TDC placement later on by IR pending GOC discussions    GI:  #Shock liver  Unchanged  LFTs. Jaundice with hyperbilirubinemia (direct).   - Trend liver enzymes  - RUQ US showing enlarged periportal and periaortic enlarged lymph nodes and enlarged left lateral liver lobe and CBD 9 mm.     #Occult blood in feces  Positive Occult blood with dropping Hgb daily below goal of 6 ( Hx of SS). Patient with melena during hospital stay requiring pRBC to meet goal and with inappropriate Hgb response.  S/p 9 U of pRBCs since the beginning of April given downtrending Hgb  Maintain active T & S  C/w PPI 40 mg IV BID for now.  GI consulted given downtrending Hgb, melanotic stool, and positive stool occult blood feces. S/p EGD with cauterization of gastric ulcer & hemostatic spraying of three duodenal ulcers. No NGT (until 48 hrs after EGD).    #Diet:  - NPO & no NGT (Until 48 hrs after EGD). Hold all PO meds and seizure medications converted to IV.   - GI unable to place PEG due to hepatomegaly.   - Per IR consult: Given no safe window for endoscopic placement, recommend surgical consultation.   - Surgery consulted and pending [facility] discussion  - S/p EGD with no NGT. C/w Maintenance IVFs while NPO.       HEMATOLOGIC/ONCOLGOGIC   #Sickle cell crisis--Resolved  #Acute blood loss anemia- 2/2 sickle cells and uremia vs critical illness vs GIB  Downtrending direct hyperbilirubinemia,  gradually & Increasing reticulocyte count.   - Heme following & recs appreciated  - D/c deferasirox due to current renal failure   - S/p 9 U of pRBC since beginning of April  - Occult blood positive. C/w PPI 40 mg IV BID for now.  - Transfuse as needed Hb >6 goal per Heme.  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 CXR showing increased RUL opacification.   - D/c  aztreonam 2000 mg IV qd ( started 3/22- 3/27)   - S/p vancomycin 1500 mg IV qd (4/2); 2nd dose 4/4, 3rd dose 4/5  - 4/2- CXR showing increasing RUL consolidation  - Completed 5d meropenem 500 mg IV qd (started 3/27-3/31); (4/2-4/7). Hold off Meropenem after 5 day course   - CTM resp status    #Temperature  -Patient around 100-100.6 F last few days, but spiked fever of 38.6 C. R. Fem alexis removed on 04/08 and XANDER Self placed on (04/11). Patient currently afebrile, but if spikes fever again (>100.8 F) would reculture (blood + urine) and remove daniel.    MSK:  #infiltration  Infiltration of sodium bicarb into left arm, now with arm distension and bullae. Elevated uric acid with nodule suggestive of podagra however no signs of active gout flare.   -Appreciate orthopedic hand surgery, not compartment syndrome   - CTM    Lines:   XANDER self (04/11)  RRoberta Rivera & RRoberta Martinez (03/25)    Ethics: Full Code   Pending: Discussion with family (Wednesday (04/16) @ 11 AM)  Palliative Team: Consulted

## 2025-04-13 NOTE — PROGRESS NOTE ADULT - SUBJECTIVE AND OBJECTIVE BOX
[facility] Division of Kidney Diseases & Hypertension  FOLLOW UP NOTE  [phone]--------------------------------------------------------------------------------  Chief Complaint: MATA    24 hour events/subjective: Pt. seen and examined in MICU earlier today. Pt. remains on trach to vent. Pt. not responsive to verbal/tactile stimuli. Unable to obtain ROS.   PAST HISTORY  --------------------------------------------------------------------------------  No significant changes to PMH, PSH, FHx, SHx from H&P unless otherwise noted.    ALLERGIES & MEDICATIONS  --------------------------------------------------------------------------------  Allergies    penicillin (Pruritus)  hydroxyurea (Other)  piperacillin-tazobactam (Urticaria)  ceftriaxone (Anaphylaxis)    Intolerances      Standing Inpatient Medications  albuterol/ipratropium for Nebulization 3 milliLiter(s) Nebulizer every 6 hours  chlorhexidine 0.12% Liquid 15 milliLiter(s) Oral Mucosa every 12 hours  chlorhexidine 2% Cloths 1 Application(s) Topical daily  dextrose 5% + lactated ringers. 1000 milliLiter(s) IV Continuous <Continuous>  diazepam  Injectable 10 milliGRAM(s) IV Push every 4 hours  influenza   Vaccine 0.5 milliLiter(s) IntraMuscular once  levETIRAcetam   Injectable 500 milliGRAM(s) IV Push every 12 hours  pantoprazole  Injectable 40 milliGRAM(s) IV Push every 12 hours  petrolatum Ophthalmic Ointment 1 Application(s) Both EYES two times a day    PRN Inpatient Medications  diazepam  Injectable 10 milliGRAM(s) IV Push every 4 hours PRN  sodium chloride 0.9% lock flush 10 milliLiter(s) IV Push every 1 hour PRN      REVIEW OF SYSTEMS  --------------------------------------------------------------------------------  Unable to obtain ROS due to current clinical status.     VITALS/PHYSICAL EXAM  --------------------------------------------------------------------------------  T(C): 37.2 (04-13-25 @ 08:00), Max: 37.6 (04-12-25 @ 20:00)  HR: 110 (04-13-25 @ 09:00) (79 - 110)  BP: 118/74 (04-13-25 @ 09:00) (112/72 - 147/86)  RR: 20 (04-13-25 @ 09:00) (17 - 30)  SpO2: 97% (04-13-25 @ 09:00) (92% - 100%)  Wt(kg): --        04-12-25 @ 07:01  -  04-13-25 @ 07:00  --------------------------------------------------------  IN: 1215.9 mL / OUT: 1325 mL / NET: -109.1 mL    04-13-25 @ 07:01  -  04-13-25 @ 09:23  --------------------------------------------------------  IN: 0 mL / OUT: 75 mL / NET: -75 mL      Physical Exam:  Gen: critically ill appearing.  Pulm: +bilateral expiratory wheeze, +trach   CV: RRR, S1S2;  Abd: +BS, soft  Extremities: no LE edema  Neuro: not responsive to verbal/tactile stimuli.  Skin: Warm  Vascular Access: L IJ non-tunneled HD catheter    LABS/STUDIES  --------------------------------------------------------------------------------              6.3    16.51 >-----------<  232      [04-13-25 @ 00:15]              17.7     139  |  99  |  61  ----------------------------<  104      [04-13-25 @ 00:15]  3.4   |  21  |  2.86        Ca     8.8     [04-13-25 @ 00:15]      iCa    1.13     [04-13 @ 00:15]      Mg     1.80     [04-13-25 @ 00:15]      Phos  6.5     [04-13-25 @ 00:15]    TPro  6.5  /  Alb  2.5  /  TBili  7.6  /  DBili  6.2  /  AST  149  /  ALT  31  /  AlkPhos  172  [04-13-25 @ 00:15]    PT/INR: PT 12.9 , INR 1.08       [04-13-25 @ 00:15]  PTT: 25.8       [04-13-25 @ 00:15]    Uric acid 8.7      [04-13-25 @ 00:15]        [04-13-25 @ 00:15]    Creatinine Trend:  SCr 2.86 [04-13 @ 00:15]  SCr 1.98 [04-11 @ 23:24]  SCr 4.05 [04-11 @ 00:16]  SCr 3.50 [04-10 @ 01:29]  SCr 2.76 [04-09 @ 00:55]             [facility] Division of Kidney Diseases & Hypertension  FOLLOW UP NOTE  [phone]--------------------------------------------------------------------------------    Chief Complaint: MATA    24 hour events/subjective: Pt. seen and examined in MICU earlier today. Pt. with tracheostomy, on mechanical ventilation. Unable to obtain ROS.     PAST HISTORY  --------------------------------------------------------------------------------  No significant changes to PMH, PSH, FHx, SHx from H&P unless otherwise noted.    ALLERGIES & MEDICATIONS  --------------------------------------------------------------------------------  Allergies    penicillin (Pruritus)  hydroxyurea (Other)  piperacillin-tazobactam (Urticaria)  ceftriaxone (Anaphylaxis)    Intolerances    Standing Inpatient Medications  albuterol/ipratropium for Nebulization 3 milliLiter(s) Nebulizer every 6 hours  chlorhexidine 0.12% Liquid 15 milliLiter(s) Oral Mucosa every 12 hours  chlorhexidine 2% Cloths 1 Application(s) Topical daily  dextrose 5% + lactated ringers. 1000 milliLiter(s) IV Continuous <Continuous>  diazepam  Injectable 10 milliGRAM(s) IV Push every 4 hours  influenza   Vaccine 0.5 milliLiter(s) IntraMuscular once  levETIRAcetam   Injectable 500 milliGRAM(s) IV Push every 12 hours  pantoprazole  Injectable 40 milliGRAM(s) IV Push every 12 hours  petrolatum Ophthalmic Ointment 1 Application(s) Both EYES two times a day    PRN Inpatient Medications  diazepam  Injectable 10 milliGRAM(s) IV Push every 4 hours PRN  sodium chloride 0.9% lock flush 10 milliLiter(s) IV Push every 1 hour PRN    REVIEW OF SYSTEMS  --------------------------------------------------------------------------------  Unable to obtain ROS due to current clinical status.     VITALS/PHYSICAL EXAM  --------------------------------------------------------------------------------  T(C): 37.2 (04-13-25 @ 08:00), Max: 37.6 (04-12-25 @ 20:00)  HR: 110 (04-13-25 @ 09:00) (79 - 110)  BP: 118/74 (04-13-25 @ 09:00) (112/72 - 147/86)  RR: 20 (04-13-25 @ 09:00) (17 - 30)  SpO2: 97% (04-13-25 @ 09:00) (92% - 100%)  Wt(kg): --    04-12-25 @ 07:01  -  04-13-25 @ 07:00  --------------------------------------------------------  IN: 1215.9 mL / OUT: 1325 mL / NET: -109.1 mL    04-13-25 @ 07:01  -  04-13-25 @ 09:23  --------------------------------------------------------  IN: 0 mL / OUT: 75 mL / NET: -75 mL    Physical Exam:  Gen: +ill appearing  HEENT: +tracheostomy  Pulm: +bilateral expiratory wheezes   CV: RRR, S1S2+  Abd: +BS, soft  Extremities: no LE edema  Neuro: Unresponsive to verbal stimuli.  Skin: Warm  Vascular Access: Left IJ non-tunneled HD catheter    LABS/STUDIES  --------------------------------------------------------------------------------              6.3    16.51 >-----------<  232      [04-13-25 @ 00:15]              17.7     139  |  99  |  61  ----------------------------<  104      [04-13-25 @ 00:15]  3.4   |  21  |  2.86        Ca     8.8     [04-13-25 @ 00:15]      iCa    1.13     [04-13 @ 00:15]      Mg     1.80     [04-13-25 @ 00:15]      Phos  6.5     [04-13-25 @ 00:15]    TPro  6.5  /  Alb  2.5  /  TBili  7.6  /  DBili  6.2  /  AST  149  /  ALT  31  /  AlkPhos  172  [04-13-25 @ 00:15]    Creatinine Trend:  SCr 2.86 [04-13 @ 00:15]  SCr 1.98 [04-11 @ 23:24]  SCr 4.05 [04-11 @ 00:16]  SCr 3.50 [04-10 @ 01:29]  SCr 2.76 [04-09 @ 00:55]

## 2025-04-14 LAB
ADD ON TEST-SPECIMEN IN LAB: SIGNIFICANT CHANGE UP
ALBUMIN SERPL ELPH-MCNC: 2.5 G/DL — LOW (ref 3.3–5)
ALP SERPL-CCNC: 158 U/L — HIGH (ref 40–120)
ALT FLD-CCNC: 34 U/L — SIGNIFICANT CHANGE UP (ref 4–41)
ANION GAP SERPL CALC-SCNC: 15 MMOL/L — HIGH (ref 7–14)
ANION GAP SERPL CALC-SCNC: 17 MMOL/L — HIGH (ref 7–14)
APTT BLD: 26.8 SEC — SIGNIFICANT CHANGE UP (ref 24.5–35.6)
AST SERPL-CCNC: 143 U/L — HIGH (ref 4–40)
BASOPHILS # BLD AUTO: 0.14 K/UL — SIGNIFICANT CHANGE UP (ref 0–0.2)
BASOPHILS # BLD AUTO: 0.2 K/UL — SIGNIFICANT CHANGE UP (ref 0–0.2)
BASOPHILS NFR BLD AUTO: 0.8 % — SIGNIFICANT CHANGE UP (ref 0–2)
BASOPHILS NFR BLD AUTO: 1.1 % — SIGNIFICANT CHANGE UP (ref 0–2)
BILIRUB SERPL-MCNC: 7.5 MG/DL — HIGH (ref 0.2–1.2)
BUN SERPL-MCNC: 63 MG/DL — HIGH (ref 7–23)
BUN SERPL-MCNC: 67 MG/DL — HIGH (ref 7–23)
CALCIUM SERPL-MCNC: 8.6 MG/DL — SIGNIFICANT CHANGE UP (ref 8.4–10.5)
CALCIUM SERPL-MCNC: 8.6 MG/DL — SIGNIFICANT CHANGE UP (ref 8.4–10.5)
CHLORIDE SERPL-SCNC: 105 MMOL/L — SIGNIFICANT CHANGE UP (ref 98–107)
CHLORIDE SERPL-SCNC: 108 MMOL/L — HIGH (ref 98–107)
CO2 SERPL-SCNC: 22 MMOL/L — SIGNIFICANT CHANGE UP (ref 22–31)
CO2 SERPL-SCNC: 22 MMOL/L — SIGNIFICANT CHANGE UP (ref 22–31)
CREAT SERPL-MCNC: 3.11 MG/DL — HIGH (ref 0.5–1.3)
CREAT SERPL-MCNC: 3.26 MG/DL — HIGH (ref 0.5–1.3)
EGFR: 23 ML/MIN/1.73M2 — LOW
EGFR: 23 ML/MIN/1.73M2 — LOW
EGFR: 24 ML/MIN/1.73M2 — LOW
EGFR: 24 ML/MIN/1.73M2 — LOW
EOSINOPHIL # BLD AUTO: 0.83 K/UL — HIGH (ref 0–0.5)
EOSINOPHIL # BLD AUTO: 1.34 K/UL — HIGH (ref 0–0.5)
EOSINOPHIL NFR BLD AUTO: 4.8 % — SIGNIFICANT CHANGE UP (ref 0–6)
EOSINOPHIL NFR BLD AUTO: 7.1 % — HIGH (ref 0–6)
GAS PNL BLDV: SIGNIFICANT CHANGE UP
GAS PNL BLDV: SIGNIFICANT CHANGE UP
GLUCOSE SERPL-MCNC: 123 MG/DL — HIGH (ref 70–99)
GLUCOSE SERPL-MCNC: 129 MG/DL — HIGH (ref 70–99)
HAPTOGLOB SERPL-MCNC: <20 MG/DL — LOW (ref 34–200)
HCT VFR BLD CALC: 15.4 % — CRITICAL LOW (ref 39–50)
HCT VFR BLD CALC: 21.9 % — LOW (ref 39–50)
HGB BLD-MCNC: 5.5 G/DL — CRITICAL LOW (ref 13–17)
HGB BLD-MCNC: 7.5 G/DL — LOW (ref 13–17)
IANC: 12.59 K/UL — HIGH (ref 1.8–7.4)
IANC: 14.53 K/UL — HIGH (ref 1.8–7.4)
IMM GRANULOCYTES NFR BLD AUTO: 1 % — HIGH (ref 0–0.9)
IMM GRANULOCYTES NFR BLD AUTO: 1 % — HIGH (ref 0–0.9)
INR BLD: 1.21 RATIO — HIGH (ref 0.85–1.16)
LDH SERPL L TO P-CCNC: 561 U/L — HIGH (ref 135–225)
LYMPHOCYTES # BLD AUTO: 1.24 K/UL — SIGNIFICANT CHANGE UP (ref 1–3.3)
LYMPHOCYTES # BLD AUTO: 12.4 % — LOW (ref 13–44)
LYMPHOCYTES # BLD AUTO: 2.16 K/UL — SIGNIFICANT CHANGE UP (ref 1–3.3)
LYMPHOCYTES # BLD AUTO: 6.6 % — LOW (ref 13–44)
MAGNESIUM SERPL-MCNC: 2 MG/DL — SIGNIFICANT CHANGE UP (ref 1.6–2.6)
MAGNESIUM SERPL-MCNC: 2 MG/DL — SIGNIFICANT CHANGE UP (ref 1.6–2.6)
MCHC RBC-ENTMCNC: 30.1 PG — SIGNIFICANT CHANGE UP (ref 27–34)
MCHC RBC-ENTMCNC: 30.9 PG — SIGNIFICANT CHANGE UP (ref 27–34)
MCHC RBC-ENTMCNC: 34.2 G/DL — SIGNIFICANT CHANGE UP (ref 32–36)
MCHC RBC-ENTMCNC: 35.7 G/DL — SIGNIFICANT CHANGE UP (ref 32–36)
MCV RBC AUTO: 86.5 FL — SIGNIFICANT CHANGE UP (ref 80–100)
MCV RBC AUTO: 88 FL — SIGNIFICANT CHANGE UP (ref 80–100)
MONOCYTES # BLD AUTO: 1.3 K/UL — HIGH (ref 0–0.9)
MONOCYTES # BLD AUTO: 1.51 K/UL — HIGH (ref 0–0.9)
MONOCYTES NFR BLD AUTO: 6.9 % — SIGNIFICANT CHANGE UP (ref 2–14)
MONOCYTES NFR BLD AUTO: 8.7 % — SIGNIFICANT CHANGE UP (ref 2–14)
NEUTROPHILS # BLD AUTO: 12.59 K/UL — HIGH (ref 1.8–7.4)
NEUTROPHILS # BLD AUTO: 14.53 K/UL — HIGH (ref 1.8–7.4)
NEUTROPHILS NFR BLD AUTO: 72.3 % — SIGNIFICANT CHANGE UP (ref 43–77)
NEUTROPHILS NFR BLD AUTO: 77.3 % — HIGH (ref 43–77)
NRBC # BLD AUTO: 0.03 K/UL — HIGH (ref 0–0)
NRBC # BLD AUTO: 0.03 K/UL — HIGH (ref 0–0)
NRBC # FLD: 0.03 K/UL — HIGH (ref 0–0)
NRBC # FLD: 0.03 K/UL — HIGH (ref 0–0)
NRBC BLD AUTO-RTO: 0 /100 WBCS — SIGNIFICANT CHANGE UP (ref 0–0)
NRBC BLD AUTO-RTO: 0 /100 WBCS — SIGNIFICANT CHANGE UP (ref 0–0)
PHOSPHATE SERPL-MCNC: 6.9 MG/DL — HIGH (ref 2.5–4.5)
PHOSPHATE SERPL-MCNC: 7.4 MG/DL — HIGH (ref 2.5–4.5)
PLATELET # BLD AUTO: 219 K/UL — SIGNIFICANT CHANGE UP (ref 150–400)
PLATELET # BLD AUTO: 245 K/UL — SIGNIFICANT CHANGE UP (ref 150–400)
POTASSIUM SERPL-MCNC: 3.4 MMOL/L — LOW (ref 3.5–5.3)
POTASSIUM SERPL-MCNC: 3.9 MMOL/L — SIGNIFICANT CHANGE UP (ref 3.5–5.3)
POTASSIUM SERPL-SCNC: 3.4 MMOL/L — LOW (ref 3.5–5.3)
POTASSIUM SERPL-SCNC: 3.9 MMOL/L — SIGNIFICANT CHANGE UP (ref 3.5–5.3)
PROT SERPL-MCNC: 6.6 G/DL — SIGNIFICANT CHANGE UP (ref 6–8.3)
PROTHROM AB SERPL-ACNC: 14.4 SEC — HIGH (ref 9.9–13.4)
RBC # BLD: 1.78 M/UL — LOW (ref 4.2–5.8)
RBC # BLD: 2.49 M/UL — LOW (ref 4.2–5.8)
RBC # FLD: 18.9 % — HIGH (ref 10.3–14.5)
RBC # FLD: 19 % — HIGH (ref 10.3–14.5)
SODIUM SERPL-SCNC: 144 MMOL/L — SIGNIFICANT CHANGE UP (ref 135–145)
SODIUM SERPL-SCNC: 145 MMOL/L — SIGNIFICANT CHANGE UP (ref 135–145)
WBC # BLD: 17.41 K/UL — HIGH (ref 3.8–10.5)
WBC # BLD: 18.79 K/UL — HIGH (ref 3.8–10.5)
WBC # FLD AUTO: 17.41 K/UL — HIGH (ref 3.8–10.5)
WBC # FLD AUTO: 18.79 K/UL — HIGH (ref 3.8–10.5)

## 2025-04-14 PROCEDURE — 99232 SBSQ HOSP IP/OBS MODERATE 35: CPT | Mod: GC

## 2025-04-14 PROCEDURE — 76604 US EXAM CHEST: CPT | Mod: 26,GC

## 2025-04-14 PROCEDURE — 93308 TTE F-UP OR LMTD: CPT | Mod: 26,GC

## 2025-04-14 PROCEDURE — 76705 ECHO EXAM OF ABDOMEN: CPT | Mod: 26,GC

## 2025-04-14 PROCEDURE — 99291 CRITICAL CARE FIRST HOUR: CPT | Mod: GC,25

## 2025-04-14 RX ORDER — ACETAMINOPHEN 500 MG/5ML
1000 LIQUID (ML) ORAL ONCE
Refills: 0 | Status: COMPLETED | OUTPATIENT
Start: 2025-04-14 | End: 2025-04-14

## 2025-04-14 RX ORDER — DEXTROSE 50 % IN WATER 50 %
50 SYRINGE (ML) INTRAVENOUS ONCE
Refills: 0 | Status: DISCONTINUED | OUTPATIENT
Start: 2025-04-14 | End: 2025-04-14

## 2025-04-14 RX ORDER — FENTANYL CITRATE-0.9 % NACL/PF 100MCG/2ML
50 SYRINGE (ML) INTRAVENOUS ONCE
Refills: 0 | Status: DISCONTINUED | OUTPATIENT
Start: 2025-04-14 | End: 2025-04-14

## 2025-04-14 RX ORDER — MIDAZOLAM IN 0.9 % SOD.CHLORID 1 MG/ML
2 PLASTIC BAG, INJECTION (ML) INTRAVENOUS ONCE
Refills: 0 | Status: DISCONTINUED | OUTPATIENT
Start: 2025-04-14 | End: 2025-04-14

## 2025-04-14 RX ORDER — DEXTROSE 50 % IN WATER 50 %
25 SYRINGE (ML) INTRAVENOUS ONCE
Refills: 0 | Status: DISCONTINUED | OUTPATIENT
Start: 2025-04-14 | End: 2025-04-18

## 2025-04-14 RX ADMIN — LEVETIRACETAM 500 MILLIGRAM(S): 10 INJECTION, SOLUTION INTRAVENOUS at 05:12

## 2025-04-14 RX ADMIN — Medication 100 MILLIEQUIVALENT(S): at 06:11

## 2025-04-14 RX ADMIN — Medication 15 MILLILITER(S): at 05:12

## 2025-04-14 RX ADMIN — Medication 1000 MILLIGRAM(S): at 22:40

## 2025-04-14 RX ADMIN — DIAZEPAM 10 MILLIGRAM(S): 2 TABLET ORAL at 17:45

## 2025-04-14 RX ADMIN — DIAZEPAM 10 MILLIGRAM(S): 2 TABLET ORAL at 05:13

## 2025-04-14 RX ADMIN — IPRATROPIUM BROMIDE AND ALBUTEROL SULFATE 3 MILLILITER(S): .5; 2.5 SOLUTION RESPIRATORY (INHALATION) at 09:06

## 2025-04-14 RX ADMIN — DIAZEPAM 10 MILLIGRAM(S): 2 TABLET ORAL at 21:06

## 2025-04-14 RX ADMIN — IPRATROPIUM BROMIDE AND ALBUTEROL SULFATE 3 MILLILITER(S): .5; 2.5 SOLUTION RESPIRATORY (INHALATION) at 04:10

## 2025-04-14 RX ADMIN — DIAZEPAM 10 MILLIGRAM(S): 2 TABLET ORAL at 02:18

## 2025-04-14 RX ADMIN — Medication 50 MICROGRAM(S): at 20:40

## 2025-04-14 RX ADMIN — SODIUM CHLORIDE 60 MILLILITER(S): 9 INJECTION, SOLUTION INTRAVENOUS at 02:18

## 2025-04-14 RX ADMIN — Medication 50 MICROGRAM(S): at 20:55

## 2025-04-14 RX ADMIN — Medication 40 MILLIGRAM(S): at 05:13

## 2025-04-14 RX ADMIN — Medication 1 APPLICATION(S): at 17:41

## 2025-04-14 RX ADMIN — DIAZEPAM 10 MILLIGRAM(S): 2 TABLET ORAL at 10:27

## 2025-04-14 RX ADMIN — IPRATROPIUM BROMIDE AND ALBUTEROL SULFATE 3 MILLILITER(S): .5; 2.5 SOLUTION RESPIRATORY (INHALATION) at 15:33

## 2025-04-14 RX ADMIN — LEVETIRACETAM 500 MILLIGRAM(S): 10 INJECTION, SOLUTION INTRAVENOUS at 17:45

## 2025-04-14 RX ADMIN — IPRATROPIUM BROMIDE AND ALBUTEROL SULFATE 3 MILLILITER(S): .5; 2.5 SOLUTION RESPIRATORY (INHALATION) at 21:40

## 2025-04-14 RX ADMIN — Medication 100 MILLIEQUIVALENT(S): at 07:04

## 2025-04-14 RX ADMIN — SODIUM CHLORIDE 60 MILLILITER(S): 9 INJECTION, SOLUTION INTRAVENOUS at 19:29

## 2025-04-14 RX ADMIN — Medication 40 MILLIGRAM(S): at 17:45

## 2025-04-14 RX ADMIN — Medication 15 MILLILITER(S): at 17:45

## 2025-04-14 RX ADMIN — Medication 400 MILLIGRAM(S): at 22:13

## 2025-04-14 RX ADMIN — Medication 1 APPLICATION(S): at 10:28

## 2025-04-14 RX ADMIN — Medication 2 MILLIGRAM(S): at 20:21

## 2025-04-14 RX ADMIN — DIAZEPAM 10 MILLIGRAM(S): 2 TABLET ORAL at 14:10

## 2025-04-14 RX ADMIN — Medication 1 APPLICATION(S): at 05:12

## 2025-04-14 RX ADMIN — Medication 100 MILLIEQUIVALENT(S): at 05:12

## 2025-04-14 NOTE — PROGRESS NOTE ADULT - ASSESSMENT
Assessment and Plan: 	  Patient is 44y Male with PMHx of sickle cell disease admitted for anemia concerning for sickle cell crisis s/p ureteroscopy w/ L kidney biopsy 3/19, post-operative course c/b acute hemorrhagic shock, PEA arrest with ROSC; currently with acute renal failure on HD and global anoxic brain injury and urothelial cancer; s/p tracheostomy and pending GOC discussions to decide on possible interventions and dispo. GI consulted for melena patient s/p EGD with cauterization of gastric ulcer & hemostatic spraying of three duodenal ulcers. Pending further GOC discussions    NEURO:  #Global anoxic brain injury   AAOx0.  Guarded prognosis.   - Since no NGT for now, c/w IVP Valium 10 mg Q4H standing and 10 mg Q4H PRN for myoclonus (switch to Valium 40 mg PO TID when possible).   - Repeat CT B 3/24 showing diffuse sulcal effacement with increased intracranial pressure, and few patchy foci of cortical blurring   - 3/25 MRIB with diffuse global abnormal supratentorial cortical restricted diffusion consistent with global hypoxic injury     #Seizures  No further myoclonus of upper body off propofol  - Witness myoclonic jerking concerning for seizures iso anoxic brain injury  - s/p 2.5g keppra load & midazolam 2mg IV q8 PRN   - vEEG report: "Abundant myoclonic seizures in the setting of a severe diffuse/multifocal cerebral dysfunction which can be seen in the setting of sedative effect or due to an anoxic injury, with improvement after 20:50 suggesting a lowering of sedation"; will f/u with neuro recommendations. Repeat vEEG 3/24 showing severe cerebral dysfunction   -C/w levetiracetam 500 mg IV BID & Valium as above.   -Completely off propofol      CV:  #Shock - hemorrhagic shock s/p kidney biopsy s/p 5u pRBC, 3 plasma, 3 plt  last dose of lovenox AC on 3/18 at 5PM   Hgb 8 -> ~3.  Current Hg = 5-6  - Monitor cbc q24  - Was on Midodrine 10mg TID & trend CBC daily for Hg goal. Given NPO with no NGT, can hold off on midodrine for now and monitor.   - Hg goal >6 per heme/onc  - GI consulted given downtrending Hgb, melanotic stool, and positive stool occult blood feces. S/p EGD with cauterization of gastric ulcer & hemostatic spraying of three duodenal ulcers. No NGT (until 48 hrs after EGD)-04/14 can be resumed.      #Hx of VTE (R IJ thrombus, L brachial DVT)  - CTM, hold AC given hemorrhage  - Bullae present on arm with DVT  - Appreciate orthopedics hand surgery consult, not compartment syndrome     PULM:  #Acute hypoxic respiratory failure  #Pna- 2/2 ventilation requirement  #Tracheostomy   CXR with R lung field and left mid and lower lung field hazy opacities.  - continue with duonebs Q6H & s/p HTS  - 3/28 CXR showing increasing RUL consolidation   -4/4 bedside tracheostomy   -S/p Abx completion course as below and currently on meropenem 500 mg Q24H (04/02 - 04/07)  -C/w trach care & wean vent settings as tolerated    RENAL:  #Acute renal failure.   #Renal mass s/p biopsy -   #Acidosis  Oliguric & receiving Bumex IVP PRN per nephro recs  Acute renal failure s/p cardiac arrest most likely 2/2 ATN requiring intermittent HD with nephro following  - TOV 3/28, failed; replaced daniel on 4/1; Attempt TOV when possible  - C/w Doxazosin when possible (hold for now)  - Trend Cr, UOP, lytes  - S/p bicarb drip completion  - Hold off sodium bicarbonate 650 mg po TID for now  - 3/31 removed L IJV shiley; 4/2 placed R fem shiley that was removed on 04/08  - Per pathology result of renal biopsy, noninvasive urothelial malignancy   - HD sessions per nephro. Removed R. femoral line (04/08) & placed L. IJ shiley with possible TDC placement later on by IR pending GOC discussions    GI:  #Shock liver  Unchanged  LFTs. Jaundice with hyperbilirubinemia (direct).   - Trend liver enzymes  - RUQ US showing enlarged periportal and periaortic enlarged lymph nodes and enlarged left lateral liver lobe and CBD 9 mm.     #Occult blood in feces  Positive Occult blood with dropping Hgb daily below goal of 6 ( Hx of SS). Patient with melena during hospital stay requiring pRBC to meet goal and with inappropriate Hgb response.  S/p 10 U of pRBCs since the beginning of April given downtrending Hgb  Maintain active T & S  C/w PPI 40 mg IV BID for now.  GI consulted given downtrending Hgb, melanotic stool, and positive stool occult blood feces. S/p EGD with cauterization of gastric ulcer & hemostatic spraying of three duodenal ulcers. No NGT (until 48 hrs after EGD).    #Diet:  - NPO & no NGT (Until 48 hrs after EGD). Hold all PO meds and seizure medications converted to IV.   - GI unable to place PEG due to hepatomegaly.   - Per IR consult: Given no safe window for endoscopic placement, recommend surgical consultation.   - Surgery consulted and pending [facility] discussion  - S/p EGD with no NGT. C/w Maintenance IVFs while NPO.       HEMATOLOGIC/ONCOLGOGIC   #Sickle cell crisis--Resolved  #Acute blood loss anemia- 2/2 sickle cells and uremia vs critical illness vs GIB  Downtrending direct hyperbilirubinemia,  gradually & Increasing reticulocyte count.   - Heme following & recs appreciated  - D/c deferasirox due to current renal failure   - S/p 10 U of pRBC since beginning of April  - Occult blood positive. C/w PPI 40 mg IV BID for now.  - Transfuse as needed Hb >6 goal per Heme.  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 CXR showing increased RUL opacification.   - D/c  aztreonam 2000 mg IV qd ( started 3/22- 3/27)   - S/p vancomycin 1500 mg IV qd (4/2); 2nd dose 4/4, 3rd dose 4/5  - 4/2- CXR showing increasing RUL consolidation  - Completed 5d meropenem 500 mg IV qd (started 3/27-3/31); (4/2-4/7). Hold off Meropenem after 5 day course   - CTM resp status    #Temperature  -Patient around 100-100.6 F last few days, but spiked fever of 38.6 C. R. Vamsi self removed on 04/08 and XANDER Self placed on (04/11). Patient currently afebrile, but if spikes fever again (>101 F) would reculture (blood + urine) and remove daniel.    MSK:  #infiltration  Infiltration of sodium bicarb into left arm, now with arm distension and bullae. Elevated uric acid with nodule suggestive of podagra however no signs of active gout flare.   -Appreciate orthopedic hand surgery, not compartment syndrome   - CTM    Lines:   XANDER self (04/11)  CHLOE Rivera & CHLOE Martinez (03/25)    Ethics: Full Code   Pending: Discussion with family (Wednesday (04/16) @ 11 AM)  Palliative Team: Consulted

## 2025-04-14 NOTE — CHART NOTE - NSCHARTNOTEFT_GEN_A_CORE
: Antonio Brandon  INDICATION: AHRF, GIB    PROCEDURE:  [X] LIMITED ECHO  [X] LIMITED CHEST  [ ] LIMITED RETROPERITONEAL  [X] LIMITED ABDOMINAL  [ ] LIMITED DVT  [ ] NEEDLE GUIDANCE VASCULAR  [ ] NEEDLE GUIDANCE THORACENTESIS  [ ] NEEDLE GUIDANCE PARACENTESIS  [ ] NEEDLE GUIDANCE PERICARDIOCENTESIS  [ ] OTHER    FINDINGS/INTERPRETATION:  Lungs  A lines bilaterally  No effusions    Heart  Normal LV Systolic function    Abdomen  Moderate ascites : Antonio Marquezg  INDICATION: AHRF, GIB    PROCEDURE:  [X] LIMITED ECHO  [X] LIMITED CHEST  [ ] LIMITED RETROPERITONEAL  [X] LIMITED ABDOMINAL  [ ] LIMITED DVT  [ ] NEEDLE GUIDANCE VASCULAR  [ ] NEEDLE GUIDANCE THORACENTESIS  [ ] NEEDLE GUIDANCE PARACENTESIS  [ ] NEEDLE GUIDANCE PERICARDIOCENTESIS  [ ] OTHER    FINDINGS/INTERPRETATION:  Lungs  A lines bilaterally  No effusions    Heart  Normal LV Systolic function    Abdomen  Moderate ascites    Attending Addendum:     I was present during the entire procedure and agree with the above findings and interpretation. TIme spent on the procedure was separate from the critical care time spent caring for the patient.       Brandon Koch MD  Interventional Pulmonology & Critical Care Medicine  [facility]

## 2025-04-14 NOTE — PROGRESS NOTE ADULT - SUBJECTIVE AND OBJECTIVE BOX
Interval Events:   no acute events  200cc melena reported. vitals remain stable. off vasopressors. hgb 5.5 g/dL  no meaningful clinical improvement noted  d/w brother and multiple family members at bedside    Hospital Medications:  albuterol/ipratropium for Nebulization 3 milliLiter(s) Nebulizer every 6 hours  chlorhexidine 0.12% Liquid 15 milliLiter(s) Oral Mucosa every 12 hours  chlorhexidine 2% Cloths 1 Application(s) Topical daily  dextrose 5% + lactated ringers. 1000 milliLiter(s) IV Continuous <Continuous>  dextrose 50% Injectable 25 Gram(s) IV Push once  diazepam  Injectable 10 milliGRAM(s) IV Push every 4 hours  influenza   Vaccine 0.5 milliLiter(s) IntraMuscular once  levETIRAcetam   Injectable 500 milliGRAM(s) IV Push every 12 hours  pantoprazole  Injectable 40 milliGRAM(s) IV Push every 12 hours  petrolatum Ophthalmic Ointment 1 Application(s) Both EYES two times a day  sodium chloride 0.9% lock flush 10 milliLiter(s) IV Push every 1 hour PRN      ROS: unable to obtain    PHYSICAL EXAM:   Vital Signs:  Vital Signs Last 24 Hrs  T(C): 36.7 (14 Apr 2025 16:00), Max: 38.2 (13 Apr 2025 20:00)  T(F): 98.1 (14 Apr 2025 16:00), Max: 100.8 (13 Apr 2025 20:00)  HR: 117 (14 Apr 2025 16:00) (87 - 127)  BP: 149/90 (14 Apr 2025 16:00) (104/62 - 166/100)  BP(mean): 107 (14 Apr 2025 16:00) (74 - 118)  RR: 20 (14 Apr 2025 16:00) (10 - 24)  SpO2: 98% (14 Apr 2025 16:00) (93% - 100%)    Parameters below as of 14 Apr 2025 15:34  Patient On (Oxygen Delivery Method): ventilator      GENERAL: nonverbal  HEENT:  s/p trach  RESPIRATORY:  MV via trach  CARDIAC:  HDS  ABDOMEN:  Soft, non-tender. dark stool in FMS  SKIN:   jaundice  NEURO:  nonverbal    LABS:                        5.5    17.41 )-----------( 219      ( 14 Apr 2025 02:13 )             15.4     Mean Cell Volume: 86.5 fL (04-14-25 @ 02:13)    04-14    144  |  105  |  67[H]  ----------------------------<  129[H]  3.4[L]   |  22  |  3.26[H]    Ca    8.6      14 Apr 2025 02:13  Phos  7.4     04-14  Mg     2.00     04-14    TPro  6.6  /  Alb  2.5[L]  /  TBili  7.5[H]  /  DBili  x   /  AST  143[H]  /  ALT  34  /  AlkPhos  158[H]  04-14    LIVER FUNCTIONS - ( 14 Apr 2025 02:13 )  Alb: 2.5 g/dL / Pro: 6.6 g/dL / ALK PHOS: 158 U/L / ALT: 34 U/L / AST: 143 U/L / GGT: x           PT/INR - ( 14 Apr 2025 02:13 )   PT: 14.4 sec;   INR: 1.21 ratio         PTT - ( 14 Apr 2025 02:13 )  PTT:26.8 sec

## 2025-04-14 NOTE — PROGRESS NOTE ADULT - CRITICAL CARE ATTENDING COMMENT
Patient seen and examined with ICU team at bedside during rounds after lab data, medical records and radiology reports reviewed. I have read and agreeable in general with the documented note, assessment, and management plan which reflects my opinions from bedside rounds.      TOLU VARGAS is a 44y Male with PMH of  Sickle cell disease c/b hx of ACS, DVT, presented with low Hg (5/3) s/p bladder biopsy complicated by hemorrhagic shock, course further complicated by cardiac arrest with multiorgan failure and anoxic brain injury      - ongoing melanotic stools s /p 1u prbc        #GIB  #Gastric ulcer s/p scope on 4/11  - ng tube to be replaced in coming days current gib  - PEG tube pending GOC   # Anoxic encephalopathy 2/2 cardiac arrest   #myoclonus  - continue  keppra  - continue valium   #AHRF s/p trach  - continue PS, PC if not tolerated  #Sickle cell  #AoC anemia   - c/tm goal  > 6  #Right UE DVT   - hold ac d/t GIB  # ATN  - continue iHD  # Fluid Goal - even  # BM - daily  # POCUS -  see note   # DVT ppx - holding for GIB , SCDs  # GOC - plan for family meeting Wednesday       This patient is critically ill and required frequent bedside visits with therapy change. I have personally provided 38minutes of critical care time concurrently with the resident/fellow/NP/PA. This time excludes time spent on separate procedures and time spent teaching. I have reviewed theresident/fellow/NP/PA’s documentation and I agree with the assessment and plan of care.    Brandon Koch MD  Interventional Pulmonology & Critical Care Medicine
Medical management as above, review of results/records, discussion with patient and primary team, documentation.
Medical management as above, reviewing chart and coordinating care with primary team/staff, as well as reviewing vitals, radiology, medication list, recent labs, and prior records.    Does not include teaching time.

## 2025-04-14 NOTE — PROGRESS NOTE ADULT - SUBJECTIVE AND OBJECTIVE BOX
Health system DIVISION OF KIDNEY DISEASES AND HYPERTENSION   FOLLOW UP NOTE    --------------------------------------------------------------------------------  Chief Complaint:    24 hour events/subjective: Pt. was seen and examined in MICU today.  Pt. with tracheostomy, on mechanical ventilation. Unable to obtain ROS.       PAST HISTORY  --------------------------------------------------------------------------------  No significant changes to PMH, PSH, FHx, SHx, unless otherwise noted    ALLERGIES & MEDICATIONS  --------------------------------------------------------------------------------  Allergies    penicillin (Pruritus)  hydroxyurea (Other)  piperacillin-tazobactam (Urticaria)  ceftriaxone (Anaphylaxis)    Intolerances      Standing Inpatient Medications  albuterol/ipratropium for Nebulization 3 milliLiter(s) Nebulizer every 6 hours  chlorhexidine 0.12% Liquid 15 milliLiter(s) Oral Mucosa every 12 hours  chlorhexidine 2% Cloths 1 Application(s) Topical daily  dextrose 5% + lactated ringers. 1000 milliLiter(s) IV Continuous <Continuous>  dextrose 50% Injectable 25 Gram(s) IV Push once  diazepam  Injectable 10 milliGRAM(s) IV Push every 4 hours  influenza   Vaccine 0.5 milliLiter(s) IntraMuscular once  levETIRAcetam   Injectable 500 milliGRAM(s) IV Push every 12 hours  pantoprazole  Injectable 40 milliGRAM(s) IV Push every 12 hours  petrolatum Ophthalmic Ointment 1 Application(s) Both EYES two times a day    PRN Inpatient Medications  sodium chloride 0.9% lock flush 10 milliLiter(s) IV Push every 1 hour PRN      REVIEW OF SYSTEMS  --------------------------------------------------------------------------------  unable to obtain due to clinic status      VITALS/PHYSICAL EXAM  --------------------------------------------------------------------------------  T(C): 36.6 (04-14-25 @ 04:00), Max: 38.3 (04-13-25 @ 16:00)  HR: 96 (04-14-25 @ 09:07) (87 - 127)  BP: 130/86 (04-14-25 @ 08:00) (104/62 - 166/100)  RR: 20 (04-14-25 @ 08:00) (10 - 40)  SpO2: 99% (04-14-25 @ 09:07) (91% - 100%)  Wt(kg): --        04-13-25 @ 07:01  -  04-14-25 @ 07:00  --------------------------------------------------------  IN: 1960 mL / OUT: 1980 mL / NET: -20 mL        Physical Exam:  Gen: +ill appearing  HEENT: +tracheostomy  Pulm: +ronchi b/l   CV: RRR, S1S2+  Abd: +BS, soft  Extremities: no LE edema  Neuro: Unresponsive to verbal stimuli.  Skin: Warm  Vascular Access: Left IJ non-tunneled HD catheter      LABS/STUDIES  --------------------------------------------------------------------------------              5.5    17.41 >-----------<  219      [04-14-25 @ 02:13]              15.4     144  |  105  |  67  ----------------------------<  129      [04-14-25 @ 02:13]  3.4   |  22  |  3.26        Ca     8.6     [04-14-25 @ 02:13]      iCa    1.13     [04-13 @ 00:15]      Mg     2.00     [04-14-25 @ 02:13]      Phos  7.4     [04-14-25 @ 02:13]    TPro  6.6  /  Alb  2.5  /  TBili  7.5  /  DBili  x   /  AST  143  /  ALT  34  /  AlkPhos  158  [04-14-25 @ 02:13]    PT/INR: PT 14.4 , INR 1.21       [04-14-25 @ 02:13]  PTT: 26.8       [04-14-25 @ 02:13]    Uric acid 8.7      [04-13-25 @ 00:15]        [04-13-25 @ 00:15]    Creatinine Trend:  SCr 3.26 [04-14 @ 02:13]  SCr 2.86 [04-13 @ 00:15]  SCr 1.98 [04-11 @ 23:24]  SCr 4.05 [04-11 @ 00:16]  SCr 3.50 [04-10 @ 01:29]    Urinalysis - [04-14-25 @ 02:13]      Color  / Appearance  / SG  / pH       Gluc 129 / Ketone   / Bili  / Urobili        Blood  / Protein  / Leuk Est  / Nitrite       RBC  / WBC  / Hyaline  / Gran  / Sq Epi  / Non Sq Epi  / Bacteria       HBsAb 50.9      [03-29-25 @ 07:15]  HBsAg Nonreact      [03-17-25 @ 15:26]  HBcAb Nonreact      [03-29-25 @ 07:15]  HCV 0.10, Nonreact      [03-17-25 @ 15:26]  HIV Nonreact      [03-17-25 @ 15:26]      Tacrolimus  Cyclosporine  Sirolimus  Mycophenolate  BK PCR  CMV PCRCMVPCR Log: NotDetec [id number]/mL (12-27 @ 05:38)    Parvo PCR  EBV PCRb [facility] DIVISION OF KIDNEY DISEASES AND HYPERTENSION   FOLLOW UP NOTE    --------------------------------------------------------------------------------  Chief Complaint: MATA    24 hour events/subjective: Pt. was seen and examined in MICU today.  Pt. with tracheostomy, on mechanical ventilation. Unable to obtain ROS.     PAST HISTORY  --------------------------------------------------------------------------------  No significant changes to PMH, PSH, FHx, SHx, unless otherwise noted    ALLERGIES & MEDICATIONS  --------------------------------------------------------------------------------  Allergies    penicillin (Pruritus)  hydroxyurea (Other)  piperacillin-tazobactam (Urticaria)  ceftriaxone (Anaphylaxis)    Intolerances    Standing Inpatient Medications  albuterol/ipratropium for Nebulization 3 milliLiter(s) Nebulizer every 6 hours  chlorhexidine 0.12% Liquid 15 milliLiter(s) Oral Mucosa every 12 hours  chlorhexidine 2% Cloths 1 Application(s) Topical daily  dextrose 5% + lactated ringers. 1000 milliLiter(s) IV Continuous <Continuous>  dextrose 50% Injectable 25 Gram(s) IV Push once  diazepam  Injectable 10 milliGRAM(s) IV Push every 4 hours  influenza   Vaccine 0.5 milliLiter(s) IntraMuscular once  levETIRAcetam   Injectable 500 milliGRAM(s) IV Push every 12 hours  pantoprazole  Injectable 40 milliGRAM(s) IV Push every 12 hours  petrolatum Ophthalmic Ointment 1 Application(s) Both EYES two times a day    PRN Inpatient Medications  sodium chloride 0.9% lock flush 10 milliLiter(s) IV Push every 1 hour PRN    REVIEW OF SYSTEMS  --------------------------------------------------------------------------------  Unable to obtain ROS due to current clinical status.     VITALS/PHYSICAL EXAM  --------------------------------------------------------------------------------  T(C): 36.6 (04-14-25 @ 04:00), Max: 38.3 (04-13-25 @ 16:00)  HR: 96 (04-14-25 @ 09:07) (87 - 127)  BP: 130/86 (04-14-25 @ 08:00) (104/62 - 166/100)  RR: 20 (04-14-25 @ 08:00) (10 - 40)  SpO2: 99% (04-14-25 @ 09:07) (91% - 100%)  Wt(kg): --    04-13-25 @ 07:01  -  04-14-25 @ 07:00  --------------------------------------------------------  IN: 1960 mL / OUT: 1980 mL / NET: -20 mL    Physical Exam:  Gen: +ill appearing  HEENT: +tracheostomy  Pulm: +yamilechi b/l   CV: RRR, S1S2+  Abd: +BS, soft  Extremities: no LE edema  Neuro: Unresponsive to verbal stimuli.  Skin: Warm  Vascular Access: Left IJ non-tunneled HD catheter    LABS/STUDIES  --------------------------------------------------------------------------------              5.5    17.41 >-----------<  219      [04-14-25 @ 02:13]              15.4     144  |  105  |  67  ----------------------------<  129      [04-14-25 @ 02:13]  3.4   |  22  |  3.26        Ca     8.6     [04-14-25 @ 02:13]      iCa    1.13     [04-13 @ 00:15]      Mg     2.00     [04-14-25 @ 02:13]      Phos  7.4     [04-14-25 @ 02:13]    TPro  6.6  /  Alb  2.5  /  TBili  7.5  /  DBili  x   /  AST  143  /  ALT  34  /  AlkPhos  158  [04-14-25 @ 02:13]    Creatinine Trend:  SCr 3.26 [04-14 @ 02:13]  SCr 2.86 [04-13 @ 00:15]  SCr 1.98 [04-11 @ 23:24]  SCr 4.05 [04-11 @ 00:16]  SCr 3.50 [04-10 @ 01:29]

## 2025-04-14 NOTE — PROGRESS NOTE ADULT - ATTENDING COMMENTS
Patient seen/examined.  Family at bedside.  Recommendations as noted–monitor serial hemoglobin/hematocrit and continue current regimen of pantoprazole.  No plan for follow-up EGD at current time but will continue to monitor and reassess as to need for follow-up endoscopic intervention.  Await family meeting regarding goals of care.

## 2025-04-14 NOTE — PROGRESS NOTE ADULT - ASSESSMENT
Mr. Downey is 44 year old male with sickle cell disease admitted for sickle cell crisis s/p ureteroscopy w/ L kidney biopsy 3/19 confirming urothelial cancer, post-operative course c/b hemorrhagic shock, PEA arrest with ROSC c/b global anoxic brain injury, acute renal failure requiring HD and respiratory failure s/p tracheostomy. GI consulted for melena & anemia.     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 He remains hemodynamically stable and not requiring vasopressors. Coags and PLT count are normal, however likely has platelet dysfunction secondary to severe uremia related to renal failure. Complex and ongoing goals are care are noted given guarded prognosis.   EGD was performed at bedside in the MICU on 4/11 with findings of no severe active bleeding, an elliptical gastric ulcer (likely 2/2 NGT trauma) with a visible vessel s/p injection with epinephrine & cautery, multiple duodenal ulcers one with pigment spot and oozing s/p hemostatic spray  ongoing dark stools and acute on chronic anemia is noted    Recommendations:  - No current plans for repeat endoscopy. D/w family who is agreeable  - Supportive measures with IV PPI BID and transfusion as needed  - Risk vs benefit of resuming NGT per primary. Risks include worsening of known gastric ulceration and bleeding  - Ongoing GOC per MICU team     Brandon Nguyen MD  Gastroenterology/Hepatology Fellow  Available via [employer] Teams  Pager: [phone]    On Weekdays after 5 PM to 8AM and Weekends/Holidays (All day)  For non-urgent consults, please email [email or web address] or [email or web address]  For urgent consults, please contact on call GI team.  See Amion schedule ([facility]), [facility] [phone] ([facility]), or call hospital  ([facility])

## 2025-04-14 NOTE — PROGRESS NOTE ADULT - PROBLEM SELECTOR PLAN 1
Pt. with MATA in setting of hemorrhagic shock and PEA arrest. Pt. with most likely ATN. Scr on admission was 1.25 (3/15/25). Scr progressively worsened to 6.04 on 3/24/25. Renal US done on 3/20/25 showed large left renal subcapsular hematoma. Pt. received HD from 3/24/25 to 3/29/25 for oliguric kidney failure. Pt. restarted on HD 4/4/25 for metabolic acidosis and uremia. Last HD treatment was on 4/11/25. Labs from today reviewed, serum potassium low, please replete. Pt. currently non-oliguric with ~1.7L of UOP in last 24 hours. No plan for HD today. Will assess need for HD daily. Monitor labs and UOP. Avoid nephrotoxins. Dose medications as per eGFR.        Eldon Hamilton  Nephrology Fellow  Feel free to contact me on TEAMS  After 5 pm and on weekends please contact the on-call Fellow. Pt. with MATA in setting of hemorrhagic shock and PEA arrest. Pt. with most likely ATN. Scr on admission was 1.25 (3/15/25). Scr progressively worsened to 6.04 on 3/24/25. Renal US done on 3/20/25 showed large left renal subcapsular hematoma. Pt. received HD from 3/24/25 to 3/29/25 for oliguric kidney failure. Pt. restarted on HD 4/4/25 for metabolic acidosis and uremia. Last HD treatment was on 4/11/25. Labs from today reviewed. Scr elevated at 3.26 today. Pt. with mild hypokalemia, replete potassium. Pt. currently non-oliguric with ~1.7L of UOP in last 24 hours. No plan for HD today. Will assess need for HD daily. Monitor labs and UOP. Avoid nephrotoxins. Dose medications as per eGFR.      Eldon Hamilton  Nephrology Fellow  Feel free to contact me on TEAMS  After 5 pm and on weekends please contact the on-call Fellow.

## 2025-04-15 LAB
ALBUMIN SERPL ELPH-MCNC: 2.5 G/DL — LOW (ref 3.3–5)
ALP SERPL-CCNC: 154 U/L — HIGH (ref 40–120)
ALT FLD-CCNC: 42 U/L — HIGH (ref 4–41)
ANION GAP SERPL CALC-SCNC: 15 MMOL/L — HIGH (ref 7–14)
APTT BLD: 28 SEC — SIGNIFICANT CHANGE UP (ref 24.5–35.6)
AST SERPL-CCNC: 152 U/L — HIGH (ref 4–40)
BASOPHILS # BLD AUTO: 0.18 K/UL — SIGNIFICANT CHANGE UP (ref 0–0.2)
BASOPHILS NFR BLD AUTO: 1 % — SIGNIFICANT CHANGE UP (ref 0–2)
BILIRUB SERPL-MCNC: 7.6 MG/DL — HIGH (ref 0.2–1.2)
BLD GP AB SCN SERPL QL: NEGATIVE — SIGNIFICANT CHANGE UP
BUN SERPL-MCNC: 65 MG/DL — HIGH (ref 7–23)
CA-I BLD-SCNC: 1.11 MMOL/L — LOW (ref 1.15–1.29)
CALCIUM SERPL-MCNC: 8.4 MG/DL — SIGNIFICANT CHANGE UP (ref 8.4–10.5)
CHLORIDE SERPL-SCNC: 108 MMOL/L — HIGH (ref 98–107)
CK SERPL-CCNC: 19 U/L — LOW (ref 30–200)
CO2 SERPL-SCNC: 21 MMOL/L — LOW (ref 22–31)
CREAT SERPL-MCNC: 3.08 MG/DL — HIGH (ref 0.5–1.3)
EGFR: 25 ML/MIN/1.73M2 — LOW
EGFR: 25 ML/MIN/1.73M2 — LOW
EOSINOPHIL # BLD AUTO: 1.21 K/UL — HIGH (ref 0–0.5)
EOSINOPHIL NFR BLD AUTO: 6.6 % — HIGH (ref 0–6)
GAS PNL BLDV: SIGNIFICANT CHANGE UP
GLUCOSE SERPL-MCNC: 125 MG/DL — HIGH (ref 70–99)
HCT VFR BLD CALC: 20.1 % — CRITICAL LOW (ref 39–50)
HEMOGLOBIN INTERPRETATION: SIGNIFICANT CHANGE UP
HGB A MFR BLD: 82.4 % — LOW (ref 95–97.6)
HGB A2 MFR BLD: 2.5 % — SIGNIFICANT CHANGE UP (ref 2.4–3.5)
HGB BLD-MCNC: 6.9 G/DL — CRITICAL LOW (ref 13–17)
HGB F MFR BLD: <1 % — SIGNIFICANT CHANGE UP (ref 0–1.5)
HGB S MFR BLD: 14.5 % — HIGH
IANC: 13.97 K/UL — HIGH (ref 1.8–7.4)
IMM GRANULOCYTES NFR BLD AUTO: 0.8 % — SIGNIFICANT CHANGE UP (ref 0–0.9)
INR BLD: 1.31 RATIO — HIGH (ref 0.85–1.16)
LYMPHOCYTES # BLD AUTO: 1.66 K/UL — SIGNIFICANT CHANGE UP (ref 1–3.3)
LYMPHOCYTES # BLD AUTO: 9 % — LOW (ref 13–44)
MAGNESIUM SERPL-MCNC: 1.8 MG/DL — SIGNIFICANT CHANGE UP (ref 1.6–2.6)
MCHC RBC-ENTMCNC: 30.4 PG — SIGNIFICANT CHANGE UP (ref 27–34)
MCHC RBC-ENTMCNC: 34.3 G/DL — SIGNIFICANT CHANGE UP (ref 32–36)
MCV RBC AUTO: 88.5 FL — SIGNIFICANT CHANGE UP (ref 80–100)
MONOCYTES # BLD AUTO: 1.26 K/UL — HIGH (ref 0–0.9)
MONOCYTES NFR BLD AUTO: 6.8 % — SIGNIFICANT CHANGE UP (ref 2–14)
NEUTROPHILS # BLD AUTO: 13.97 K/UL — HIGH (ref 1.8–7.4)
NEUTROPHILS NFR BLD AUTO: 75.8 % — SIGNIFICANT CHANGE UP (ref 43–77)
NRBC # BLD AUTO: 0.02 K/UL — HIGH (ref 0–0)
NRBC # FLD: 0.02 K/UL — HIGH (ref 0–0)
NRBC BLD AUTO-RTO: 0 /100 WBCS — SIGNIFICANT CHANGE UP (ref 0–0)
PHOSPHATE SERPL-MCNC: 6.5 MG/DL — HIGH (ref 2.5–4.5)
PLATELET # BLD AUTO: 227 K/UL — SIGNIFICANT CHANGE UP (ref 150–400)
POTASSIUM SERPL-MCNC: 3.3 MMOL/L — LOW (ref 3.5–5.3)
POTASSIUM SERPL-SCNC: 3.3 MMOL/L — LOW (ref 3.5–5.3)
PROT SERPL-MCNC: 6.8 G/DL — SIGNIFICANT CHANGE UP (ref 6–8.3)
PROTHROM AB SERPL-ACNC: 15.5 SEC — HIGH (ref 9.9–13.4)
RBC # BLD: 2.27 M/UL — LOW (ref 4.2–5.8)
RBC # FLD: 19 % — HIGH (ref 10.3–14.5)
RH IG SCN BLD-IMP: POSITIVE — SIGNIFICANT CHANGE UP
SODIUM SERPL-SCNC: 144 MMOL/L — SIGNIFICANT CHANGE UP (ref 135–145)
WBC # BLD: 18.42 K/UL — HIGH (ref 3.8–10.5)
WBC # FLD AUTO: 18.42 K/UL — HIGH (ref 3.8–10.5)

## 2025-04-15 PROCEDURE — 93308 TTE F-UP OR LMTD: CPT | Mod: 26,GC

## 2025-04-15 PROCEDURE — 99291 CRITICAL CARE FIRST HOUR: CPT | Mod: GC,25

## 2025-04-15 PROCEDURE — 76705 ECHO EXAM OF ABDOMEN: CPT | Mod: 26,GC

## 2025-04-15 PROCEDURE — 99232 SBSQ HOSP IP/OBS MODERATE 35: CPT | Mod: GC

## 2025-04-15 PROCEDURE — 76604 US EXAM CHEST: CPT | Mod: 26,GC

## 2025-04-15 RX ORDER — ACETAMINOPHEN 500 MG/5ML
1000 LIQUID (ML) ORAL ONCE
Refills: 0 | Status: COMPLETED | OUTPATIENT
Start: 2025-04-15 | End: 2025-04-15

## 2025-04-15 RX ORDER — MAGNESIUM SULFATE 500 MG/ML
1 SYRINGE (ML) INJECTION ONCE
Refills: 0 | Status: COMPLETED | OUTPATIENT
Start: 2025-04-15 | End: 2025-04-15

## 2025-04-15 RX ADMIN — IPRATROPIUM BROMIDE AND ALBUTEROL SULFATE 3 MILLILITER(S): .5; 2.5 SOLUTION RESPIRATORY (INHALATION) at 09:36

## 2025-04-15 RX ADMIN — DIAZEPAM 10 MILLIGRAM(S): 2 TABLET ORAL at 05:23

## 2025-04-15 RX ADMIN — Medication 1 APPLICATION(S): at 11:59

## 2025-04-15 RX ADMIN — LEVETIRACETAM 500 MILLIGRAM(S): 10 INJECTION, SOLUTION INTRAVENOUS at 17:06

## 2025-04-15 RX ADMIN — Medication 100 MILLIEQUIVALENT(S): at 01:12

## 2025-04-15 RX ADMIN — IPRATROPIUM BROMIDE AND ALBUTEROL SULFATE 3 MILLILITER(S): .5; 2.5 SOLUTION RESPIRATORY (INHALATION) at 14:32

## 2025-04-15 RX ADMIN — Medication 40 MILLIGRAM(S): at 05:23

## 2025-04-15 RX ADMIN — DIAZEPAM 10 MILLIGRAM(S): 2 TABLET ORAL at 10:15

## 2025-04-15 RX ADMIN — LEVETIRACETAM 500 MILLIGRAM(S): 10 INJECTION, SOLUTION INTRAVENOUS at 05:23

## 2025-04-15 RX ADMIN — IPRATROPIUM BROMIDE AND ALBUTEROL SULFATE 3 MILLILITER(S): .5; 2.5 SOLUTION RESPIRATORY (INHALATION) at 22:01

## 2025-04-15 RX ADMIN — IPRATROPIUM BROMIDE AND ALBUTEROL SULFATE 3 MILLILITER(S): .5; 2.5 SOLUTION RESPIRATORY (INHALATION) at 03:15

## 2025-04-15 RX ADMIN — DIAZEPAM 10 MILLIGRAM(S): 2 TABLET ORAL at 17:07

## 2025-04-15 RX ADMIN — DIAZEPAM 10 MILLIGRAM(S): 2 TABLET ORAL at 01:12

## 2025-04-15 RX ADMIN — Medication 1 APPLICATION(S): at 17:10

## 2025-04-15 RX ADMIN — Medication 100 MILLIEQUIVALENT(S): at 14:07

## 2025-04-15 RX ADMIN — Medication 1000 MILLIGRAM(S): at 18:56

## 2025-04-15 RX ADMIN — Medication 100 GRAM(S): at 12:00

## 2025-04-15 RX ADMIN — Medication 100 MILLIEQUIVALENT(S): at 15:40

## 2025-04-15 RX ADMIN — SODIUM CHLORIDE 60 MILLILITER(S): 9 INJECTION, SOLUTION INTRAVENOUS at 19:12

## 2025-04-15 RX ADMIN — Medication 1 APPLICATION(S): at 05:22

## 2025-04-15 RX ADMIN — Medication 100 MILLIEQUIVALENT(S): at 11:59

## 2025-04-15 RX ADMIN — Medication 400 MILLIGRAM(S): at 17:54

## 2025-04-15 RX ADMIN — Medication 40 MILLIGRAM(S): at 17:06

## 2025-04-15 RX ADMIN — DIAZEPAM 10 MILLIGRAM(S): 2 TABLET ORAL at 14:14

## 2025-04-15 RX ADMIN — SODIUM CHLORIDE 60 MILLILITER(S): 9 INJECTION, SOLUTION INTRAVENOUS at 07:31

## 2025-04-15 RX ADMIN — DIAZEPAM 10 MILLIGRAM(S): 2 TABLET ORAL at 21:47

## 2025-04-15 NOTE — PROGRESS NOTE ADULT - SUBJECTIVE AND OBJECTIVE BOX
[facility] DIVISION OF KIDNEY DISEASES AND HYPERTENSION   FOLLOW UP NOTE    --------------------------------------------------------------------------------  Chief Complaint:    24 hour events/subjective: Pt. was seen and examined in MICU today.  Pt. with tracheostomy, on mechanical ventilation. Unable to obtain ROS.       PAST HISTORY  --------------------------------------------------------------------------------  No significant changes to PMH, PSH, FHx, SHx, unless otherwise noted    ALLERGIES & MEDICATIONS  --------------------------------------------------------------------------------  Allergies    penicillin (Pruritus)  hydroxyurea (Other)  piperacillin-tazobactam (Urticaria)  ceftriaxone (Anaphylaxis)    Intolerances      Standing Inpatient Medications  albuterol/ipratropium for Nebulization 3 milliLiter(s) Nebulizer every 6 hours  chlorhexidine 2% Cloths 1 Application(s) Topical daily  dextrose 5% + lactated ringers. 1000 milliLiter(s) IV Continuous <Continuous>  dextrose 50% Injectable 25 Gram(s) IV Push once  diazepam  Injectable 10 milliGRAM(s) IV Push every 4 hours  influenza   Vaccine 0.5 milliLiter(s) IntraMuscular once  levETIRAcetam   Injectable 500 milliGRAM(s) IV Push every 12 hours  pantoprazole  Injectable 40 milliGRAM(s) IV Push every 12 hours  petrolatum Ophthalmic Ointment 1 Application(s) Both EYES two times a day    PRN Inpatient Medications  sodium chloride 0.9% lock flush 10 milliLiter(s) IV Push every 1 hour PRN      REVIEW OF SYSTEMS  --------------------------------------------------------------------------------  unable to obtain due to clinic status    VITALS/PHYSICAL EXAM  --------------------------------------------------------------------------------  T(C): 37 (04-15-25 @ 08:00), Max: 38 (04-14-25 @ 20:00)  HR: 94 (04-15-25 @ 09:00) (88 - 146)  BP: 147/86 (04-15-25 @ 09:00) (128/82 - 188/93)  RR: 22 (04-15-25 @ 09:00) (20 - 38)  SpO2: 97% (04-15-25 @ 09:00) (96% - 100%)  Wt(kg): --        04-14-25 @ 07:01  -  04-15-25 @ 07:00  --------------------------------------------------------  IN: 1580 mL / OUT: 2085 mL / NET: -505 mL    04-15-25 @ 07:01  -  04-15-25 @ 09:31  --------------------------------------------------------  IN: 60 mL / OUT: 150 mL / NET: -90 mL        Physical Exam:  Gen: +ill appearing  HEENT: +tracheostomy  Pulm: +jolene b/l   CV: RRR, S1S2+  Abd: +BS, soft  Extremities: no LE edema  Neuro: Unresponsive to verbal stimuli.  Skin: Warm  Vascular Access: Left IJ non-tunneled HD catheter      LABS/STUDIES  --------------------------------------------------------------------------------              6.9    18.42 >-----------<  227      [04-15-25 @ 00:14]              20.1     144  |  108  |  65  ----------------------------<  125      [04-15-25 @ 00:14]  3.3   |  21  |  3.08        Ca     8.4     [04-15-25 @ 00:14]      iCa    1.11     [04-15 @ 00:14]      Mg     1.80     [04-15-25 @ 00:14]      Phos  6.5     [04-15-25 @ 00:14]    TPro  6.8  /  Alb  2.5  /  TBili  7.6  /  DBili  x   /  AST  152  /  ALT  42  /  AlkPhos  154  [04-15-25 @ 00:14]    PT/INR: PT 15.5 , INR 1.31       [04-15-25 @ 00:14]  PTT: 28.0       [04-15-25 @ 00:14]          [04-14-25 @ 20:04]    Creatinine Trend:  SCr 3.08 [04-15 @ 00:14]  SCr 3.11 [04-14 @ 20:04]  SCr 3.26 [04-14 @ 02:13]  SCr 2.86 [04-13 @ 00:15]  SCr 1.98 [04-11 @ 23:24]    Urinalysis - [04-15-25 @ 00:14]      Color  / Appearance  / SG  / pH       Gluc 125 / Ketone   / Bili  / Urobili        Blood  / Protein  / Leuk Est  / Nitrite       RBC  / WBC  / Hyaline  / Gran  / Sq Epi  / Non Sq Epi  / Bacteria       HBsAb 50.9      [03-29-25 @ 07:15]  HBsAg Nonreact      [03-17-25 @ 15:26]  HBcAb Nonreact      [03-29-25 @ 07:15]  HCV 0.10, Nonreact      [03-17-25 @ 15:26]  HIV Nonreact      [03-17-25 @ 15:26]      Tacrolimus  Cyclosporine  Sirolimus  Mycophenolate  BK PCR  CMV PCRCMVPCR Log: NotDetec [id number]/mL (12-27 @ 05:38)    Parvo PCR  EBV PCR [facility] DIVISION OF KIDNEY DISEASES AND HYPERTENSION   FOLLOW UP NOTE    --------------------------------------------------------------------------------  Chief Complaint: MATA    24 hour events/subjective: Pt. was seen and examined in MICU today.  Pt. with tracheostomy, on mechanical ventilation. Unable to obtain ROS.     PAST HISTORY  --------------------------------------------------------------------------------  No significant changes to PMH, PSH, FHx, SHx, unless otherwise noted    ALLERGIES & MEDICATIONS  --------------------------------------------------------------------------------  Allergies    penicillin (Pruritus)  hydroxyurea (Other)  piperacillin-tazobactam (Urticaria)  ceftriaxone (Anaphylaxis)    Intolerances    Standing Inpatient Medications  albuterol/ipratropium for Nebulization 3 milliLiter(s) Nebulizer every 6 hours  chlorhexidine 2% Cloths 1 Application(s) Topical daily  dextrose 5% + lactated ringers. 1000 milliLiter(s) IV Continuous <Continuous>  dextrose 50% Injectable 25 Gram(s) IV Push once  diazepam  Injectable 10 milliGRAM(s) IV Push every 4 hours  influenza   Vaccine 0.5 milliLiter(s) IntraMuscular once  levETIRAcetam   Injectable 500 milliGRAM(s) IV Push every 12 hours  pantoprazole  Injectable 40 milliGRAM(s) IV Push every 12 hours  petrolatum Ophthalmic Ointment 1 Application(s) Both EYES two times a day    PRN Inpatient Medications  sodium chloride 0.9% lock flush 10 milliLiter(s) IV Push every 1 hour PRN    REVIEW OF SYSTEMS  --------------------------------------------------------------------------------  Unable to obtain ROS due to current clinical status.     VITALS/PHYSICAL EXAM  --------------------------------------------------------------------------------  T(C): 37 (04-15-25 @ 08:00), Max: 38 (04-14-25 @ 20:00)  HR: 94 (04-15-25 @ 09:00) (88 - 146)  BP: 147/86 (04-15-25 @ 09:00) (128/82 - 188/93)  RR: 22 (04-15-25 @ 09:00) (20 - 38)  SpO2: 97% (04-15-25 @ 09:00) (96% - 100%)  Wt(kg): --    04-14-25 @ 07:01  -  04-15-25 @ 07:00  --------------------------------------------------------  IN: 1580 mL / OUT: 2085 mL / NET: -505 mL    04-15-25 @ 07:01  -  04-15-25 @ 09:31  --------------------------------------------------------  IN: 60 mL / OUT: 150 mL / NET: -90 mL    Physical Exam:  Gen: +ill appearing  HEENT: +tracheostomy  Pulm: +rhonchi b/l   CV: RRR, S1S2+  Abd: +BS, soft  Extremities: no LE edema  Neuro: Unresponsive to verbal stimuli  Skin: Warm  Vascular Access: Left IJ non-tunneled HD catheter      LABS/STUDIES  --------------------------------------------------------------------------------              6.9    18.42 >-----------<  227      [04-15-25 @ 00:14]              20.1     144  |  108  |  65  ----------------------------<  125      [04-15-25 @ 00:14]  3.3   |  21  |  3.08        Ca     8.4     [04-15-25 @ 00:14]      iCa    1.11     [04-15 @ 00:14]      Mg     1.80     [04-15-25 @ 00:14]      Phos  6.5     [04-15-25 @ 00:14]    TPro  6.8  /  Alb  2.5  /  TBili  7.6  /  DBili  x   /  AST  152  /  ALT  42  /  AlkPhos  154  [04-15-25 @ 00:14]    Creatinine Trend:  SCr 3.08 [04-15 @ 00:14]  SCr 3.11 [04-14 @ 20:04]  SCr 3.26 [04-14 @ 02:13]  SCr 2.86 [04-13 @ 00:15]  SCr 1.98 [04-11 @ 23:24]

## 2025-04-15 NOTE — PROGRESS NOTE ADULT - SUBJECTIVE AND OBJECTIVE BOX
INTERVAL HPI/OVERNIGHT EVENTS:    SUBJECTIVE: Patient seen and examined at bedside.     ROS: All negative except as listed above.    VITAL SIGNS:  ICU Vital Signs Last 24 Hrs  T(C): 37 (15 Apr 2025 08:00), Max: 38 (14 Apr 2025 20:00)  T(F): 98.6 (15 Apr 2025 08:00), Max: 100.4 (14 Apr 2025 20:00)  HR: 88 (15 Apr 2025 08:00) (88 - 146)  BP: 145/98 (15 Apr 2025 08:00) (128/82 - 188/93)  BP(mean): 112 (15 Apr 2025 08:00) (96 - 123)  ABP: --  ABP(mean): --  RR: 20 (15 Apr 2025 08:00) (20 - 38)  SpO2: 100% (15 Apr 2025 08:00) (96% - 100%)    O2 Parameters below as of 15 Apr 2025 08:00  Patient On (Oxygen Delivery Method): ventilator    O2 Concentration (%): 30      Mode: AC/ CMV (Assist Control/ Continuous Mandatory Ventilation), RR (machine): 20, TV (machine): 460, FiO2: 30, PEEP: 6, ITime: 1, MAP: 11, PC: 13, PIP: 22  Plateau pressure:   P/F ratio:     04-14 @ 07:01  -  04-15 @ 07:00  --------------------------------------------------------  IN: 1580 mL / OUT: 2085 mL / NET: -505 mL    04-15 @ 07:01  -  04-15 @ 08:38  --------------------------------------------------------  IN: 60 mL / OUT: 150 mL / NET: -90 mL      CAPILLARY BLOOD GLUCOSE          ECG: reviewed.    PHYSICAL EXAM:    GENERAL: NAD, lying in bed comfortably  HEAD:  Atraumatic, normocephalic  EYES: EOMI, PERRLA, conjunctiva and sclera clear  NECK: Supple, trachea midline, no JVD  HEART: Regular rate and rhythm, no murmurs, rubs, or gallops  LUNGS: Unlabored respirations.  Clear to auscultation bilaterally, no crackles, wheezing, or rhonchi  ABDOMEN: Soft, nontender, nondistended, +BS  EXTREMITIES: 2+ peripheral pulses bilaterally, cap refill<2 secs. No clubbing, cyanosis, or edema  NERVOUS SYSTEM:  A&Ox3, following commands, moving all extremities, no focal deficits   SKIN: No rashes or lesions    MEDICATIONS:  MEDICATIONS  (STANDING):  albuterol/ipratropium for Nebulization 3 milliLiter(s) Nebulizer every 6 hours  chlorhexidine 2% Cloths 1 Application(s) Topical daily  dextrose 5% + lactated ringers. 1000 milliLiter(s) (60 mL/Hr) IV Continuous <Continuous>  dextrose 50% Injectable 25 Gram(s) IV Push once  diazepam  Injectable 10 milliGRAM(s) IV Push every 4 hours  influenza   Vaccine 0.5 milliLiter(s) IntraMuscular once  levETIRAcetam   Injectable 500 milliGRAM(s) IV Push every 12 hours  pantoprazole  Injectable 40 milliGRAM(s) IV Push every 12 hours  petrolatum Ophthalmic Ointment 1 Application(s) Both EYES two times a day    MEDICATIONS  (PRN):  sodium chloride 0.9% lock flush 10 milliLiter(s) IV Push every 1 hour PRN Pre/post blood products, medications, blood draw, and to maintain line patency      ALLERGIES:  Allergies    penicillin (Pruritus)  hydroxyurea (Other)  piperacillin-tazobactam (Urticaria)  ceftriaxone (Anaphylaxis)    Intolerances        LABS:                        6.9    18.42 )-----------( 227      ( 15 Apr 2025 00:14 )             20.1     04-15    144  |  108[H]  |  65[H]  ----------------------------<  125[H]  3.3[L]   |  21[L]  |  3.08[H]    Ca    8.4      15 Apr 2025 00:14  Phos  6.5     04-15  Mg     1.80     04-15    TPro  6.8  /  Alb  2.5[L]  /  TBili  7.6[H]  /  DBili  x   /  AST  152[H]  /  ALT  42[H]  /  AlkPhos  154[H]  04-15    PT/INR - ( 15 Apr 2025 00:14 )   PT: 15.5 sec;   INR: 1.31 ratio         PTT - ( 15 Apr 2025 00:14 )  PTT:28.0 sec  Urinalysis Basic - ( 15 Apr 2025 00:14 )    Color: x / Appearance: x / SG: x / pH: x  Gluc: 125 mg/dL / Ketone: x  / Bili: x / Urobili: x   Blood: x / Protein: x / Nitrite: x   Leuk Esterase: x / RBC: x / WBC x   Sq Epi: x / Non Sq Epi: x / Bacteria: x      ABG:      vBG:  pH, Venous: 7.41 (04-15-25 @ 00:14)  pCO2, Venous: 36 mmHg (04-15-25 @ 00:14)  pO2, Venous: 44 mmHg (04-15-25 @ 00:14)  HCO3, Venous: 23 mmol/L (04-15-25 @ 00:14)  pH, Venous: 7.33 (04-14-25 @ 20:04)  pCO2, Venous: 44 mmHg (04-14-25 @ 20:04)  pO2, Venous: 60 mmHg (04-14-25 @ 20:04)  HCO3, Venous: 23 mmol/L (04-14-25 @ 20:04)    Micro:    Culture - Blood (collected 04-08-25 @ 05:25)  Source: Blood Blood-Peripheral  Final Report (04-13-25 @ 11:00):    No growth at 5 days    Culture - Blood (collected 03-20-25 @ 10:04)  Source: Blood Blood-Peripheral  Final Report (03-25-25 @ 13:01):    No growth at 5 days        Culture - Sputum (collected 04-08-25 @ 08:00)  Source: ET Tube ET Tube  Gram Stain (04-08-25 @ 19:33):    Moderate Squamous epithelial cells per low power field    Few polymorphonuclear leukocytes per low power field    Few Yeast like cells per oil power field  Final Report (04-10-25 @ 09:06):    Commensal lay consistent with body site    Culture - Sputum (collected 03-26-25 @ 11:45)  Source: ET Tube ET Tube  Gram Stain (03-26-25 @ 22:28):    Few Squamous epithelial cells per low power field    Moderate polymorphonuclear leukocytes per low power field    Few Yeast per oil power field  Final Report (03-28-25 @ 08:15):    Commensal lay consistent with body site    Culture - Sputum (collected 03-22-25 @ 17:15)  Source: ET Tube ET Tube  Gram Stain (03-22-25 @ 23:42):    Numerous polymorphonuclear leukocytes per low power field    Rare Squamous epithelial cells per low power field    No organisms seen per oil power field  Final Report (03-24-25 @ 06:52):    Commensal lay consistent with body site        RADIOLOGY & ADDITIONAL TESTS: Reviewed. INTERVAL HPI/OVERNIGHT EVENTS: T= 100.4 F OVN & 100 cc dark brown stool in Flexiseal. No other acute events OVN.     SUBJECTIVE: Patient seen and examined at bedside. ROS could not be elicited.      VITAL SIGNS:  ICU Vital Signs Last 24 Hrs  T(C): 37 (15 Apr 2025 08:00), Max: 38 (14 Apr 2025 20:00)  T(F): 98.6 (15 Apr 2025 08:00), Max: 100.4 (14 Apr 2025 20:00)  HR: 88 (15 Apr 2025 08:00) (88 - 146)  BP: 145/98 (15 Apr 2025 08:00) (128/82 - 188/93)  BP(mean): 112 (15 Apr 2025 08:00) (96 - 123)  ABP: --  ABP(mean): --  RR: 20 (15 Apr 2025 08:00) (20 - 38)  SpO2: 100% (15 Apr 2025 08:00) (96% - 100%)    O2 Parameters below as of 15 Apr 2025 08:00  Patient On (Oxygen Delivery Method): ventilator    O2 Concentration (%): 30      Mode: AC/ CMV (Assist Control/ Continuous Mandatory Ventilation), RR (machine): 20, TV (machine): 460, FiO2: 30, PEEP: 6, ITime: 1, MAP: 11, PC: 13, PIP: 22  Plateau pressure:   P/F ratio:     04-14 @ 07:01  -  04-15 @ 07:00  --------------------------------------------------------  IN: 1580 mL / OUT: 2085 mL / NET: -505 mL    04-15 @ 07:01  -  04-15 @ 08:38  --------------------------------------------------------  IN: 60 mL / OUT: 150 mL / NET: -90 mL    I & Os   7A-7P 7P-7A 24h 7A-   dextrose 5% + ...  60   IV PiggyBack 0 200 200 0   Total In  60   Stool 100 0 100 0   Urine: Indwell... 950 1035 1985 150   Total Out 1050 1035 2085 150   TOTAL NET -330 -175 -505 -90       CAPILLARY BLOOD GLUCOSE    ECG: reviewed.    PHYSICAL EXAM:  General: NAD, normal habitus, mild jaundice. Yawning and biting down on tube. Eyes a bit more open today,   Neuro: AAOx0, sedated & non responsive to noxious stimuli, closed eyes, 5 mm pupils, sluggishly reactive pupils b/l   HEENT: icteric sclerae b/l, tracheostomy clean/dry/intact. Eyes more open today  Chest: non-tender to palpation  Heart: S1/S2, RRR   Lungs: Coarse breath sounds  Abd: soft, non-tender, but slightly distended.  Ext: Generalized trace pitting edema, LUE wrapped, nodule on R 1st digit proximal phalanges   Pulses: radial 2+ b/l, dorsalis pedis 2+ b/l   Lines/tubes/drains: NGT, Dave, & Flexiseal. L IJ shiley (04/11) & R. accucath + Midline (03/25)  Psych: unable to assess       MEDICATIONS:  MEDICATIONS  (STANDING):  albuterol/ipratropium for Nebulization 3 milliLiter(s) Nebulizer every 6 hours  chlorhexidine 2% Cloths 1 Application(s) Topical daily  dextrose 5% + lactated ringers. 1000 milliLiter(s) (60 mL/Hr) IV Continuous <Continuous>  dextrose 50% Injectable 25 Gram(s) IV Push once  diazepam  Injectable 10 milliGRAM(s) IV Push every 4 hours  influenza   Vaccine 0.5 milliLiter(s) IntraMuscular once  levETIRAcetam   Injectable 500 milliGRAM(s) IV Push every 12 hours  pantoprazole  Injectable 40 milliGRAM(s) IV Push every 12 hours  petrolatum Ophthalmic Ointment 1 Application(s) Both EYES two times a day    MEDICATIONS  (PRN):  sodium chloride 0.9% lock flush 10 milliLiter(s) IV Push every 1 hour PRN Pre/post blood products, medications, blood draw, and to maintain line patency      ALLERGIES:  Allergies    penicillin (Pruritus)  hydroxyurea (Other)  piperacillin-tazobactam (Urticaria)  ceftriaxone (Anaphylaxis)    Intolerances        LABS:                        6.9    18.42 )-----------( 227      ( 15 Apr 2025 00:14 )             20.1     04-15    144  |  108[H]  |  65[H]  ----------------------------<  125[H]  3.3[L]   |  21[L]  |  3.08[H]    Ca    8.4      15 Apr 2025 00:14  Phos  6.5     04-15  Mg     1.80     04-15    TPro  6.8  /  Alb  2.5[L]  /  TBili  7.6[H]  /  DBili  x   /  AST  152[H]  /  ALT  42[H]  /  AlkPhos  154[H]  04-15    PT/INR - ( 15 Apr 2025 00:14 )   PT: 15.5 sec;   INR: 1.31 ratio         PTT - ( 15 Apr 2025 00:14 )  PTT:28.0 sec  Urinalysis Basic - ( 15 Apr 2025 00:14 )    Color: x / Appearance: x / SG: x / pH: x  Gluc: 125 mg/dL / Ketone: x  / Bili: x / Urobili: x   Blood: x / Protein: x / Nitrite: x   Leuk Esterase: x / RBC: x / WBC x   Sq Epi: x / Non Sq Epi: x / Bacteria: x      ABG:      vBG:  pH, Venous: 7.41 (04-15-25 @ 00:14)  pCO2, Venous: 36 mmHg (04-15-25 @ 00:14)  pO2, Venous: 44 mmHg (04-15-25 @ 00:14)  HCO3, Venous: 23 mmol/L (04-15-25 @ 00:14)  pH, Venous: 7.33 (04-14-25 @ 20:04)  pCO2, Venous: 44 mmHg (04-14-25 @ 20:04)  pO2, Venous: 60 mmHg (04-14-25 @ 20:04)  HCO3, Venous: 23 mmol/L (04-14-25 @ 20:04)    Micro:    Culture - Blood (collected 04-08-25 @ 05:25)  Source: Blood Blood-Peripheral  Final Report (04-13-25 @ 11:00):    No growth at 5 days    Culture - Blood (collected 03-20-25 @ 10:04)  Source: Blood Blood-Peripheral  Final Report (03-25-25 @ 13:01):    No growth at 5 days        Culture - Sputum (collected 04-08-25 @ 08:00)  Source: ET Tube ET Tube  Gram Stain (04-08-25 @ 19:33):    Moderate Squamous epithelial cells per low power field    Few polymorphonuclear leukocytes per low power field    Few Yeast like cells per oil power field  Final Report (04-10-25 @ 09:06):    Commensal lay consistent with body site    Culture - Sputum (collected 03-26-25 @ 11:45)  Source: ET Tube ET Tube  Gram Stain (03-26-25 @ 22:28):    Few Squamous epithelial cells per low power field    Moderate polymorphonuclear leukocytes per low power field    Few Yeast per oil power field  Final Report (03-28-25 @ 08:15):    Commensal lay consistent with body site    Culture - Sputum (collected 03-22-25 @ 17:15)  Source: ET Tube ET Tube  Gram Stain (03-22-25 @ 23:42):    Numerous polymorphonuclear leukocytes per low power field    Rare Squamous epithelial cells per low power field    No organisms seen per oil power field  Final Report (03-24-25 @ 06:52):    Commensal lay consistent with body site

## 2025-04-15 NOTE — DIETITIAN NUTRITION RISK NOTIFICATION - ADDITIONAL COMMENTS/DIETITIAN RECOMMENDATIONS
Please see Dietitian Initial Assessment for complete recommendations.    Nany Chicas MS RDN CDN  On Microsoft Teams (Preferred), Pager #[phone]

## 2025-04-15 NOTE — PROGRESS NOTE ADULT - PROBLEM SELECTOR PLAN 1
Pt. with MATA in setting of hemorrhagic shock and PEA arrest. Pt. with most likely ATN. Scr on admission was 1.25 (3/15/25). Scr progressively worsened to 6.04 on 3/24/25. Renal US done on 3/20/25 showed large left renal subcapsular hematoma. Pt. received HD from 3/24/25 to 3/29/25 for oliguric kidney failure. Pt. restarted on HD 4/4/25 for metabolic acidosis and uremia. Last HD treatment was on 4/11/25. Labs from today reviewed. Scr slightly better at 3.08 today. Pt. with mild hypokalemia, replete potassium. Pt. currently non-oliguric with ~1.9L of UOP in last 24 hours. No plan for HD for now. Can remove HD accesss ([facility] Monitor labs and UOP. Avoid nephrotoxins. Dose medications as per eGFR.        Eldon Hamilton  Nephrology Fellow  Feel free to contact me on TEAMS  After 5 pm and on weekends please contact the on-call Fellow. Pt. with MATA in setting of hemorrhagic shock and PEA arrest. Pt. with most likely ATN. Scr on admission was 1.25 (3/15/25). Scr progressively worsened to 6.04 on 3/24/25. Renal US done on 3/20/25 showed large left renal subcapsular hematoma. Pt. received HD from 3/24/25 to 3/29/25 for oliguric kidney failure. Pt. restarted on HD 4/4/25 for metabolic acidosis and uremia. Last HD treatment was on 4/11/25. Labs from today reviewed. Scr elevated/stable at 3.08 today. Pt. non-oliguric with ~1.9L of UOP in last 24 hours. No current plan for HD. Recommend to remove HD catheter. Pt. with mild hypokalemia, replete potassium. Monitor labs and UOP. Avoid nephrotoxins. Dose medications as per eGFR.        Eldon Hamilton  Nephrology Fellow  Feel free to contact me on TEAMS  After 5 pm and on weekends please contact the on-call Fellow.

## 2025-04-15 NOTE — CHART NOTE - NSCHARTNOTEFT_GEN_A_CORE
: Antonio Brandon  INDICATION: AHRF, Ascities    PROCEDURE:  [X] LIMITED ECHO  [X] LIMITED CHEST  [ ] LIMITED RETROPERITONEAL  [X] LIMITED ABDOMINAL  [ ] LIMITED DVT  [ ] NEEDLE GUIDANCE VASCULAR  [ ] NEEDLE GUIDANCE THORACENTESIS  [ ] NEEDLE GUIDANCE PARACENTESIS  [ ] NEEDLE GUIDANCE PERICARDIOCENTESIS  [ ] OTHER    FINDINGS/INTERPRETATION:  Lungs:  A lines bilaterally    Heart:  Normal LV systolic funciton   IVC 1.6cm  LVOT 22.29cm    Abdomen:  Moderate Ascites    Images in Qpath : Antonio Brandon  INDICATION: AHRF, Ascities    PROCEDURE:  [X] LIMITED ECHO  [X] LIMITED CHEST  [ ] LIMITED RETROPERITONEAL  [X] LIMITED ABDOMINAL  [ ] LIMITED DVT  [ ] NEEDLE GUIDANCE VASCULAR  [ ] NEEDLE GUIDANCE THORACENTESIS  [ ] NEEDLE GUIDANCE PARACENTESIS  [ ] NEEDLE GUIDANCE PERICARDIOCENTESIS  [ ] OTHER    FINDINGS/INTERPRETATION:  Lungs:  A lines bilaterally    Heart:  Normal LV systolic funciton   IVC 1.6cm  LVOT 22.29cm    Abdomen:  Moderate Ascites    Images in Qpath    Attending Addendum:     I was present during the entire procedure and agree with the above findings and interpretation. TIme spent on the procedure was separate from the critical care time spent caring for the patient.       Brandon Koch MD  Interventional Pulmonology & Critical Care Medicine  [facility]

## 2025-04-15 NOTE — PROGRESS NOTE ADULT - ATTENDING COMMENTS
Patient seen and examined with ICU team at bedside during rounds after lab data, medical records and radiology reports reviewed. I have read and agreeable in general with the documented note, assessment, and management plan which reflects my opinions from bedside rounds.      TOLU VARGAS is a 44y Male with PMH of  Sickle cell disease c/b hx of ACS, DVT, presented with low Hg (5/3) s/p bladder biopsy complicated by hemorrhagic shock, course further complicated by cardiac arrest with multiorgan failure and anoxic brain injury      - improvement in stool color and amount.  -hg 6.9     #GIB  #Gastric ulcer s/p scope on 4/11  - ng tube to be replaced in coming days current gib  - PEG tube pending [facility]   # Anoxic encephalopathy 2/2 cardiac arrest   #myoclonus  - continue  Keppra  - continue valium   #AHRF s/p trach  - continue PS, PC if not tolerated  #Sickle cell  #AoC anemia   - c/tm goal  > 6  #Right UE DVT   - hold ac d/t GIB  # ATN  - continue iHD  # Fluid Goal - even  # BM - daily  # POCUS -  see note   # DVT ppx - holding for GIB , SCDs  # GOC - plan for family meeting Wednesday       This patient is critically ill and required frequent bedside visits with therapy change. I have personally provided 37 minutes of critical care time concurrently with the resident/fellow/NP/PA. This time excludes time spent on separate procedures and time spent teaching. I have reviewed the resident/fellow/NP/PA’s documentation and I agree with the assessment and plan of care.    Brandon Koch MD  Interventional Pulmonology & Critical Care Medicine.

## 2025-04-15 NOTE — PROGRESS NOTE ADULT - ASSESSMENT
Assessment and Plan: 	  Patient is 44y Male with PMHx of sickle cell disease admitted for anemia concerning for sickle cell crisis s/p ureteroscopy w/ L kidney biopsy 3/19, post-operative course c/b acute hemorrhagic shock, PEA arrest with ROSC; currently with acute renal failure on HD and global anoxic brain injury and urothelial cancer; s/p tracheostomy and pending GOC discussions to decide on possible interventions and dispo. GI consulted for melena patient s/p EGD with cauterization of gastric ulcer & hemostatic spraying of three duodenal ulcers. Pending further GOC discussions.    NEURO:  #Global anoxic brain injury   AAOx0.  Guarded prognosis.   - Since no NGT for now, c/w IVP Valium 10 mg Q4H standing and 10 mg Q4H PRN for myoclonus (switch to Valium 40 mg PO TID when possible).   - Repeat CT B 3/24 showing diffuse sulcal effacement with increased intracranial pressure, and few patchy foci of cortical blurring   - 3/25 MRIB with diffuse global abnormal supratentorial cortical restricted diffusion consistent with global hypoxic injury     #Seizures  No further myoclonus of upper body off propofol  - Witness myoclonic jerking concerning for seizures iso anoxic brain injury  - s/p 2.5g keppra load & midazolam 2mg IV q8 PRN   - vEEG report: "Abundant myoclonic seizures in the setting of a severe diffuse/multifocal cerebral dysfunction which can be seen in the setting of sedative effect or due to an anoxic injury, with improvement after 20:50 suggesting a lowering of sedation"; will f/u with neuro recommendations. Repeat vEEG 3/24 showing severe cerebral dysfunction   -C/w levetiracetam 500 mg IV BID & Valium as above.   -Completely off propofol      CV:  #Shock - hemorrhagic shock s/p kidney biopsy s/p 5u pRBC, 3 plasma, 3 plt  last dose of lovenox AC on 3/18 at 5PM   Hgb 8 -> ~3.  Current Hg = 5-6  - Monitor cbc q24  - Was on Midodrine 10mg TID & trend CBC daily for Hg goal. Given NPO with no NGT, can hold off on midodrine for now and monitor.   - Hg goal >6 per heme/onc  - GI consulted given downtrending Hgb, melanotic stool, and positive stool occult blood feces. S/p EGD with cauterization of gastric ulcer & hemostatic spraying of three duodenal ulcers. Pending possible NGT placement      #Hx of VTE (R IJ thrombus, L brachial DVT)  - CTM, hold AC given hemorrhage  - Bullae present on arm with DVT  - Appreciate orthopedics hand surgery consult, not compartment syndrome     PULM:  #Acute hypoxic respiratory failure  #Pna- 2/2 ventilation requirement  #Tracheostomy   CXR with R lung field and left mid and lower lung field hazy opacities.  - continue with duonebs Q6H & s/p HTS  - 3/28 CXR showing increasing RUL consolidation   -4/4 bedside tracheostomy   -S/p Abx completion course as below and currently on meropenem 500 mg Q24H (04/02 - 04/07)  -C/w trach care (Size 7 portex percutaneous tracheostomy) & wean vent settings as tolerated. Provide as much comfort as possible on vent settings    RENAL:  #Acute renal failure.   #Renal mass s/p biopsy -   #Acidosis  Oliguric & receiving Bumex IVP PRN per nephro recs  Acute renal failure s/p cardiac arrest most likely 2/2 ATN requiring intermittent HD with nephro following  - TOV 3/28, failed; replaced daniel on 4/1; Attempt TOV when possible  - C/w Doxazosin when possible (hold for now)  - Trend Cr, UOP, lytes  - S/p bicarb drip completion  - Hold off sodium bicarbonate 650 mg po TID for now  - 3/31 removed L IJV shiley; 4/2 placed R fem shiley that was removed on 04/08  - Per pathology result of renal biopsy, noninvasive urothelial malignancy   - HD sessions per nephro. Removed R. femoral line (04/08) & placed L. IJ shiley with possible TDC placement later on by IR pending GOC discussions    GI:  #Shock liver  Unchanged  LFTs. Jaundice with hyperbilirubinemia (direct).   - Trend liver enzymes  - RUQ US showing enlarged periportal and periaortic enlarged lymph nodes and enlarged left lateral liver lobe and CBD 9 mm.     #Occult blood in feces  Positive Occult blood with dropping Hgb daily below goal of 6 ( Hx of SS). Patient with melena during hospital stay requiring pRBC to meet goal and with inappropriate Hgb response.  S/p 10 U of pRBCs since the beginning of April given downtrending Hgb  Maintain active T & S  C/w PPI 40 mg IV BID for now.  GI consulted given downtrending Hgb, melanotic stool, and positive stool occult blood feces. S/p EGD with cauterization of gastric ulcer & hemostatic spraying of three duodenal ulcers. Pending possible NGT placement    #Diet:  - NPO & no NGT (Until 48 hrs after EGD). Hold all PO meds and seizure medications converted to IV.   - GI unable to place PEG due to hepatomegaly.   - Per IR consult: Given no safe window for endoscopic placement, recommend surgical consultation.   - Surgery consulted and pending [facility] discussion  - S/p EGD with no NGT. C/w Maintenance IVFs while NPO.       HEMATOLOGIC/ONCOLGOGIC   #Sickle cell crisis--Resolved  #Acute blood loss anemia- 2/2 sickle cells and uremia vs critical illness vs GIB  Downtrending direct hyperbilirubinemia,  gradually & Increasing reticulocyte count.   - Heme following & recs appreciated  - D/c deferasirox due to current renal failure   - S/p 10 U of pRBC since beginning of April  - Occult blood positive. C/w PPI 40 mg IV BID for now.  - Transfuse as needed Hb >6 goal per Heme.  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 CXR showing increased RUL opacification.   - D/c  aztreonam 2000 mg IV qd ( started 3/22- 3/27)   - S/p vancomycin 1500 mg IV qd (4/2); 2nd dose 4/4, 3rd dose 4/5  - 4/2- CXR showing increasing RUL consolidation  - Completed 5d meropenem 500 mg IV qd (started 3/27-3/31); (4/2-4/7). Hold off Meropenem after 5 day course   - CTM resp status    #Temperature  -Patient around 100-100.6 F last few days, but spiked fever of 38.6 C. R. Vamsi self removed on 04/08 and XANDER Self placed on (04/11). Patient currently afebrile, but if spikes fever again (>101 F) would reculture (blood + urine) and remove daniel.    MSK:  #infiltration  Infiltration of sodium bicarb into left arm, now with arm distension and bullae. Elevated uric acid with nodule suggestive of podagra however no signs of active gout flare.   -Appreciate orthopedic hand surgery, not compartment syndrome   - CTM    Lines:   XANDER self (04/11)  CHLOE Rivera & CHLOE Martinez (03/25)    Ethics: Full Code   Pending: Discussion with family (Wednesday (04/16) @ 11 AM)  Palliative Team: Consulted     Assessment and Plan: 	  Patient is 44y Male with PMHx of sickle cell disease admitted for anemia concerning for sickle cell crisis s/p ureteroscopy w/ L kidney biopsy 3/19, post-operative course c/b acute hemorrhagic shock, PEA arrest with ROSC; currently with acute renal failure on HD and global anoxic brain injury and urothelial cancer; s/p tracheostomy and pending GOC discussions to decide on possible interventions and dispo. GI consulted for melena patient s/p EGD with cauterization of gastric ulcer & hemostatic spraying of three duodenal ulcers. Pending further GOC discussions.    NEURO:  #Global anoxic brain injury   AAOx0.  Guarded prognosis.   - Since no NGT for now, c/w IVP Valium 10 mg Q4H standing and 10 mg Q4H PRN for myoclonus (switch to Valium 40 mg PO TID when possible).   - Repeat CT B 3/24 showing diffuse sulcal effacement with increased intracranial pressure, and few patchy foci of cortical blurring   - 3/25 MRIB with diffuse global abnormal supratentorial cortical restricted diffusion consistent with global hypoxic injury     #Seizures  No further myoclonus of upper body off propofol  - Witness myoclonic jerking concerning for seizures iso anoxic brain injury  - s/p 2.5g keppra load & midazolam 2mg IV q8 PRN   - vEEG report: "Abundant myoclonic seizures in the setting of a severe diffuse/multifocal cerebral dysfunction which can be seen in the setting of sedative effect or due to an anoxic injury, with improvement after 20:50 suggesting a lowering of sedation"; will f/u with neuro recommendations. Repeat vEEG 3/24 showing severe cerebral dysfunction   -C/w levetiracetam 500 mg IV BID & Valium as above.   -Completely off propofol      CV:  #Shock - hemorrhagic shock s/p kidney biopsy s/p 5u pRBC, 3 plasma, 3 plt---RESOLVED  last dose of lovenox AC on 3/18 at 5PM   Hgb 8 -> ~3.  Current Hg = 5-6  - Monitor cbc q24  - Was on Midodrine 10mg TID & trend CBC daily for Hg goal. Given NPO with no NGT, can hold off on midodrine for now and monitor.   - Hg goal >6 per heme/onc  - GI consulted given downtrending Hgb, melanotic stool, and positive stool occult blood feces. S/p EGD with cauterization of gastric ulcer & hemostatic spraying of three duodenal ulcers. Pending possible NGT placement      #Hx of VTE (R IJ thrombus, L brachial DVT)  - CTM, hold AC given hemorrhage  - Bullae present on arm with DVT  - Appreciate orthopedics hand surgery consult, not compartment syndrome     PULM:  #Acute hypoxic respiratory failure  #Pna- 2/2 ventilation requirement  #Tracheostomy   CXR with R lung field and left mid and lower lung field hazy opacities.  - continue with duonebs Q6H & s/p HTS  - 3/28 CXR showing increasing RUL consolidation   -4/4 bedside tracheostomy   -S/p Abx completion course as below and currently on meropenem 500 mg Q24H (04/02 - 04/07)  -C/w trach care (Size 7 portex percutaneous tracheostomy). Provide as much comfort as possible on vent settings    RENAL:  #Acute renal failure.   #Renal mass s/p biopsy -   #Acidosis  Oliguric & receiving Bumex IVP PRN per nephro recs  Acute renal failure s/p cardiac arrest most likely 2/2 ATN requiring intermittent HD with nephro following  - TOV 3/28, failed; replaced daniel on 4/1; Attempt TOV when possible pending GOCs  - Trend Cr, UOP, lytes  - S/p bicarb drip completion & sodium bicarbonate 650 mg po TIDcompletion  - Per pathology result of renal biopsy, noninvasive urothelial malignancy   - HD sessions per nephro. Removed R. femoral line (04/08) & placed L. IJ shiley (04/11). L. IJ shiley to be removed on 04/15/25.    GI:  #Shock liver  Unchanged  LFTs. Jaundice with hyperbilirubinemia (direct).   - Trend liver enzymes  - RUQ US showing enlarged periportal and periaortic enlarged lymph nodes and enlarged left lateral liver lobe and CBD 9 mm.   - LFTs stable for now    #Occult blood in feces  Positive Occult blood with dropping Hgb daily below goal of 6 ( Hx of SS). Patient with melena during hospital stay requiring pRBC to meet goal and with inappropriate Hgb response.  S/p 10 U of pRBCs since the beginning of April given downtrending Hgb  Maintain active T & S  C/w PPI 40 mg IV BID for now.  GI consulted given downtrending Hgb, melanotic stool, and positive stool occult blood feces. S/p EGD with cauterization of gastric ulcer & hemostatic spraying of three duodenal ulcers.     #Diet:  - NPO & no NGT (Until 48 hrs after EGD). Hold all PO meds and seizure medications converted to IV.   - GI unable to place PEG due to hepatomegaly.   - Per IR consult: Given no safe window for endoscopic placement, recommend surgical consultation.   - Surgery consulted and pending [facility] discussion  - S/p EGD with no NGT. C/w Maintenance IVFs while NPO. Hold off on NGT for now until stools clear      HEMATOLOGIC/ONCOLGOGIC   #Sickle cell crisis--Resolved  #Acute blood loss anemia- 2/2 sickle cells and uremia vs critical illness vs GIB  Downtrending direct hyperbilirubinemia,  gradually & Increasing reticulocyte count.   - Heme following & recs appreciated  - D/c deferasirox due to current renal failure   - S/p 10 U of pRBC since beginning of April  - Occult blood positive. C/w PPI 40 mg IV BID for now.  - Transfuse as needed Hb >6 goal per Heme.  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   -Pending [facility] discussions  -CTM    #DVT prophylaxis - SCD  -Held Heparin SQ due to recent melena    Endo:  #JUAN    ID:  #Pneumoniae-  likely 2/2 ventilator related  Afebrile with downtrending leukocytosis. CXR showing increased RUL opacification.   - D/c  aztreonam 2000 mg IV qd ( started 3/22- 3/27)   - S/p vancomycin 1500 mg IV qd (4/2); 2nd dose 4/4, 3rd dose 4/5  - 4/2- CXR showing increasing RUL consolidation  - Completed 5d meropenem 500 mg IV qd (started 3/27-3/31); (4/2-4/7).   - CTM resp status    #Temperature  -Patient around 100-100.6 F last few days, but spiked fever of 38.6 C. R. Vamsi mitchellley removed on 04/08 and XANDER Self placed on (04/11). Patient currently afebrile, but if spikes fever again (>101 F) would reculture (blood + urine) and remove daniel.    MSK:  #infiltration  Infiltration of sodium bicarb into left arm, now with arm distension and bullae. Elevated uric acid with nodule suggestive of podagra however no signs of active gout flare.   -Appreciate orthopedic hand surgery, not compartment syndrome   - CTM    Lines:   XANDER self (04/11) to be removed  R. Midline & R. Accucaart (03/25)    Ethics: Full Code   Pending: Discussion with family (Wednesday (04/16) @ 11 AM)  Palliative Team: Consulted     Assessment and Plan: 	  Patient is 44y Male with PMHx of sickle cell disease admitted for anemia concerning for sickle cell crisis s/p ureteroscopy w/ L kidney biopsy 3/19, post-operative course c/b acute hemorrhagic shock, PEA arrest with ROSC; currently with acute renal failure on HD and global anoxic brain injury and urothelial cancer; s/p tracheostomy and pending GOC discussions to decide on possible interventions and dispo. GI consulted for melena patient s/p EGD with cauterization of gastric ulcer & hemostatic spraying of three duodenal ulcers. Pending further GOC discussions.    NEURO:  #Global anoxic brain injury   AAOx0.  Guarded prognosis.   - Since no NGT for now, c/w IVP Valium 10 mg Q4H standing and 10 mg Q4H PRN for myoclonus (switch to Valium 40 mg PO TID when possible).   - Repeat CT B 3/24 showing diffuse sulcal effacement with increased intracranial pressure, and few patchy foci of cortical blurring   - 3/25 MRIB with diffuse global abnormal supratentorial cortical restricted diffusion consistent with global hypoxic injury     #Seizures  No further myoclonus of upper body off propofol  - Witness myoclonic jerking concerning for seizures iso anoxic brain injury  - s/p 2.5g keppra load & midazolam 2mg IV q8 PRN   - vEEG report: "Abundant myoclonic seizures in the setting of a severe diffuse/multifocal cerebral dysfunction which can be seen in the setting of sedative effect or due to an anoxic injury, with improvement after 20:50 suggesting a lowering of sedation"; will f/u with neuro recommendations. Repeat vEEG 3/24 showing severe cerebral dysfunction   -C/w levetiracetam 500 mg IV BID & Valium as above.   -Completely off propofol      CV:  #Shock - hemorrhagic shock s/p kidney biopsy s/p 5u pRBC, 3 plasma, 3 plt---RESOLVED  last dose of lovenox AC on 3/18 at 5PM   - Monitor cbc q24  - Hg goal >6 per heme/onc  - GI consulted given downtrending Hgb, melanotic stool, and positive stool occult blood feces. S/p EGD with cauterization of gastric ulcer & hemostatic spraying of three duodenal ulcers.       #Hx of VTE (R IJ thrombus, L brachial DVT)  - CTM, hold AC given hemorrhage  - Bullae present on arm with DVT  - Appreciate orthopedics hand surgery consult, not compartment syndrome     PULM:  #Acute hypoxic respiratory failure  #Pna- 2/2 ventilation requirement  #Tracheostomy   CXR with R lung field and left mid and lower lung field hazy opacities.  - continue with duonebs Q6H & s/p HTS  - 3/28 CXR showing increasing RUL consolidation   -4/4 bedside tracheostomy   -S/p Abx completion course as below and currently on meropenem 500 mg Q24H (04/02 - 04/07)  -C/w trach care (Size 7 portex percutaneous tracheostomy). Provide as much comfort as possible on vent settings (PC if PS not tolerated).    RENAL:  #Acute renal failure.   #Renal mass s/p biopsy -   #Acidosis  Oliguric & receiving Bumex IVP PRN per nephro recs  Acute renal failure s/p cardiac arrest most likely 2/2 ATN requiring intermittent HD with nephro following  - TOV 3/28, failed; replaced daniel on 4/1; Attempt TOV when possible pending GOCs  - Trend Cr, UOP, lytes  - S/p bicarb drip completion & sodium bicarbonate 650 mg po TIDcompletion  - Per pathology result of renal biopsy, noninvasive urothelial malignancy   - HD sessions per nephro. Removed R. femoral line (04/08) & placed L. IJ shiley (04/11). L. IJ shiley to be removed on 04/15/25.    GI:  #Shock liver  Unchanged  LFTs. Jaundice with hyperbilirubinemia (direct).   - Trend liver enzymes  - RUQ US showing enlarged periportal and periaortic enlarged lymph nodes and enlarged left lateral liver lobe and CBD 9 mm.   - LFTs stable for now    #Occult blood in feces  Positive Occult blood with dropping Hgb daily below goal of 6 ( Hx of SS). Patient with melena during hospital stay requiring pRBC to meet goal and with inappropriate Hgb response.  S/p 10 U of pRBCs since the beginning of April given downtrending Hgb  Maintain active T & S  C/w PPI 40 mg IV BID for now.  GI consulted given downtrending Hgb, melanotic stool, and positive stool occult blood feces. S/p EGD with cauterization of gastric ulcer & hemostatic spraying of three duodenal ulcers.     #Diet:  - NPO & no NGT (Until 48 hrs after EGD). Hold all PO meds and seizure medications converted to IV.   - GI unable to place PEG due to hepatomegaly.   - Per IR consult: Given no safe window for endoscopic placement, recommend surgical consultation.   - Surgery consulted and pending [facility] discussion  - S/p EGD with no NGT. C/w Maintenance IVFs while NPO. Hold off on NGT for now until stools clear      HEMATOLOGIC/ONCOLGOGIC   #Sickle cell crisis--Resolved  #Acute blood loss anemia- 2/2 sickle cells and uremia vs critical illness vs GIB  Downtrending direct hyperbilirubinemia,  gradually & Increasing reticulocyte count.   - Heme following & recs appreciated  - D/c deferasirox due to current renal failure   - S/p 10 U of pRBC since beginning of April  - Occult blood positive. C/w PPI 40 mg IV BID for now.  - Transfuse as needed Hb >6 goal per Heme.  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   -Pending [facility] discussions  -CTM    #DVT prophylaxis - SCD  -Held Heparin SQ due to recent melena    Endo:  #JUAN    ID:  #Pneumoniae-  likely 2/2 ventilator related  Afebrile with downtrending leukocytosis. CXR showing increased RUL opacification.   - D/c  aztreonam 2000 mg IV qd ( started 3/22- 3/27)   - S/p vancomycin 1500 mg IV qd (4/2); 2nd dose 4/4, 3rd dose 4/5  - 4/2- CXR showing increasing RUL consolidation  - Completed 5d meropenem 500 mg IV qd (started 3/27-3/31); (4/2-4/7).   - CTM resp status    #Temperature  -Patient around 100-100.6 F last few days, but spiked fever of 38.6 C. R. Fem paulaley removed on 04/08 and LRoberta Self placed on (04/11). Patient currently afebrile, but if spikes fever again (>101 F) would reculture (blood + urine) and remove daniel.    MSK:  #infiltration  Infiltration of sodium bicarb into left arm, now with arm distension and bullae. Elevated uric acid with nodule suggestive of podagra however no signs of active gout flare.   -Appreciate orthopedic hand surgery, not compartment syndrome   - CTM    Lines:   XANDER self (04/11) to be removed  R. Nicole & R. Accucaart (03/25)    Ethics: Full Code   Pending: Discussion with family (Wednesday (04/16) @ 11 AM)  Palliative Team: Consulted     Assessment and Plan: 	  Patient is 44y Male with PMHx of sickle cell disease admitted for anemia concerning for sickle cell crisis s/p ureteroscopy w/ L kidney biopsy 3/19, post-operative course c/b acute hemorrhagic shock, PEA arrest with ROSC; currently with acute renal failure on HD and global anoxic brain injury and urothelial cancer; s/p tracheostomy and pending GOC discussions to decide on possible interventions and dispo. GI consulted for melena patient s/p EGD with cauterization of gastric ulcer & hemostatic spraying of three duodenal ulcers. Pending further GOC discussions.    NEURO:  #Global anoxic brain injury   AAOx0.  Guarded prognosis.   - Since no NGT for now, c/w IVP Valium 10 mg Q4H standing and 10 mg Q4H PRN for myoclonus (switch to Valium 40 mg PO TID when possible).   - Repeat CT B 3/24 showing diffuse sulcal effacement with increased intracranial pressure, and few patchy foci of cortical blurring   - 3/25 MRIB with diffuse global abnormal supratentorial cortical restricted diffusion consistent with global hypoxic injury     #Seizures  No further myoclonus of upper body off propofol  - Witness myoclonic jerking concerning for seizures iso anoxic brain injury  - s/p 2.5g keppra load & midazolam 2mg IV q8 PRN   - vEEG report: "Abundant myoclonic seizures in the setting of a severe diffuse/multifocal cerebral dysfunction which can be seen in the setting of sedative effect or due to an anoxic injury, with improvement after 20:50 suggesting a lowering of sedation"; will f/u with neuro recommendations. Repeat vEEG 3/24 showing severe cerebral dysfunction   -C/w levetiracetam 500 mg IV BID & Valium as above.   -Completely off propofol      CV:  #Shock - hemorrhagic shock s/p kidney biopsy s/p 5u pRBC, 3 plasma, 3 plt---RESOLVED  last dose of lovenox AC on 3/18 at 5PM   - Monitor cbc q24  - Hg goal >6 per heme/onc  - GI consulted given downtrending Hgb, melanotic stool, and positive stool occult blood feces. S/p EGD with cauterization of gastric ulcer & hemostatic spraying of three duodenal ulcers.       #Hx of VTE (R IJ thrombus, L brachial DVT)  - CTM, hold AC given hemorrhage  - Bullae present on arm with DVT  - Appreciate orthopedics hand surgery consult, not compartment syndrome     PULM:  #Acute hypoxic respiratory failure  #Pna- 2/2 ventilation requirement  #Tracheostomy   CXR with R lung field and left mid and lower lung field hazy opacities.  - continue with duonebs Q6H & s/p HTS  - 3/28 CXR showing increasing RUL consolidation   -4/4 bedside tracheostomy   -S/p Abx completion course as below and currently on meropenem 500 mg Q24H (04/02 - 04/07)  -C/w trach care (Size 7 portex percutaneous tracheostomy). Provide as much comfort as possible on vent settings (PC if PS not tolerated).    RENAL:  #Acute renal failure.   #Renal mass s/p biopsy -   #Acidosis  Oliguric & receiving Bumex IVP PRN per nephro recs  Acute renal failure s/p cardiac arrest most likely 2/2 ATN requiring intermittent HD with nephro following  - TOV 3/28, failed; replaced daniel on 4/1; Attempt TOV when possible pending GOCs  - Trend Cr, UOP, lytes  - S/p bicarb drip completion & sodium bicarbonate 650 mg po TIDcompletion  - Per pathology result of renal biopsy, noninvasive urothelial malignancy   - HD sessions per nephro. XANDER espinoza removed.  GI:  #Shock liver  Unchanged  LFTs. Jaundice with hyperbilirubinemia (direct).   - Trend liver enzymes  - RUQ US showing enlarged periportal and periaortic enlarged lymph nodes and enlarged left lateral liver lobe and CBD 9 mm.   - LFTs stable for now    #Occult blood in feces  Positive Occult blood with dropping Hgb daily below goal of 6 ( Hx of SS). Patient with melena during hospital stay requiring pRBC to meet goal and with inappropriate Hgb response.  S/p 10 U of pRBCs since the beginning of April given downtrending Hgb  Maintain active T & S  C/w PPI 40 mg IV BID for now.  GI consulted given downtrending Hgb, melanotic stool, and positive stool occult blood feces. S/p EGD with cauterization of gastric ulcer & hemostatic spraying of three duodenal ulcers.     #Diet:  - NPO & no NGT (Until 48 hrs after EGD). Hold all PO meds and seizure medications converted to IV.   - GI unable to place PEG due to hepatomegaly.   - Per IR consult: Given no safe window for endoscopic placement, recommend surgical consultation.   - Surgery consulted and pending [facility] discussion  - S/p EGD with no NGT. C/w Maintenance IVFs while NPO. Hold off on NGT for now until stools clear      HEMATOLOGIC/ONCOLGOGIC   #Sickle cell crisis--Resolved  #Acute blood loss anemia- 2/2 sickle cells and uremia vs critical illness vs GIB  Downtrending direct hyperbilirubinemia,  gradually & Increasing reticulocyte count.   - Heme following & recs appreciated  - D/c deferasirox due to current renal failure   - S/p 10 U of pRBC since beginning of April  - Occult blood positive. C/w PPI 40 mg IV BID for now.  - Transfuse as needed Hb >6 goal per Heme.  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   -Pending [facility] discussions  -CTM    #DVT prophylaxis - SCD  -Held Heparin SQ due to recent melena    Endo:  #JUAN    ID:  #Pneumoniae-  likely 2/2 ventilator related  Afebrile with downtrending leukocytosis. CXR showing increased RUL opacification.   - D/c  aztreonam 2000 mg IV qd ( started 3/22- 3/27)   - S/p vancomycin 1500 mg IV qd (4/2); 2nd dose 4/4, 3rd dose 4/5  - 4/2- CXR showing increasing RUL consolidation  - Completed 5d meropenem 500 mg IV qd (started 3/27-3/31); (4/2-4/7).   - CTM resp status    #Temperature  -Patient around 100-100.6 F last few days, but spiked fever of 38.6 C. R. Fem shiley removed on 04/08 and L. IJ Shiley placed on (04/11). Patient currently afebrile, but if spikes fever again (>101 F) would reculture (blood + urine) and remove daniel.    MSK:  #infiltration  Infiltration of sodium bicarb into left arm, now with arm distension and bullae. Elevated uric acid with nodule suggestive of podagra however no signs of active gout flare.   -Appreciate orthopedic hand surgery, not compartment syndrome   - CTM    Lines:   R. Midline & R. Accucath (03/25)    Ethics: Full Code   Pending: Discussion with family (Wednesday (04/16) @ 11 AM)  Palliative Team: Consulted

## 2025-04-15 NOTE — PROGRESS NOTE ADULT - ATTENDING COMMENTS
Pt. with MATA. Scr elevated/stable at 3.08 today. Pt. non-oliguric. Assessment and plan for MATA/HD as outlined above. No current plan for HD. Recommend to remove HD catheter. Monitor labs and urine output. Avoid nephrotoxins. Dose medications as per eGFR.

## 2025-04-15 NOTE — CHART NOTE - NSCHARTNOTEFT_GEN_A_CORE
NUTRITION FOLLOW UP NOTE    Patient is seen for a follow-up nutrition assessment.    SOURCE: [X] Other (Chart Review)    Medical Course: Per chart review, patient is a 44y Male with PMH HTN, Gout, marijuana dependence, sick cell disease, prior acute chest syndrome, iron overload, and RUE DVT (12/2024) who presented to [facility] for anemia concerning for sickle cell crisis. S/p ureteroscopy with L kidney biopsy (3/19), post-operative course complicated by acute hemorrhagic shock, PEA arrest with ROSC. Currently with acute renal failure on HD and global anoxic brain injury abd urothelial cancer. S/p tracheostomy (4/4) and pending [facility] discussions.    Diet Order: NPO (04-12-25 @ 07:25)  NPO after Midnight: NPO Start Date: 10-Apr-2025 / NPO Start Time: 23:59 (04-10-25 @ 17:14)      Nutrition Course:  - MAP >65mmHg. S/p tracheostomy placement (4/4/25). Patient on IV hydration support. Patient with global anoxic brain injury, unable to participate in assessment. No family present at bedside. Patient continues with prolonged NPO status since 4/6/25 (x10 days ongoing). Per chart review, noted IR documentation (4/6), "IR consulted for PEG placement. No safe window for endoscopic placement as per GI... Given no safe window for endoscopic placement, recommend surgical consultation." Planned for [facility] family meeting on Wednesday (4/16/25), please clarify goals of care as it relates to nutrition. S/p L IJ shiley placement (4/11), last HD session provided 4/11 per nephro documentation. Per review of labs, noted with hyperphosphatemia and hypokalemia. Triglycerides elevated (>300mg/dL).     GI Distress: No report of nausea/vomiting/diarrhea/constipation.   Bowel Movement: 4/13/25 per RN flowsheet. Not noted to be on a bowel regimen.       Pertinent Medications: MEDICATIONS  (STANDING):  albuterol/ipratropium for Nebulization 3 milliLiter(s) Nebulizer every 6 hours  chlorhexidine 2% Cloths 1 Application(s) Topical daily  dextrose 5% + lactated ringers. 1000 milliLiter(s) (60 mL/Hr) IV Continuous <Continuous>  dextrose 50% Injectable 25 Gram(s) IV Push once  diazepam  Injectable 10 milliGRAM(s) IV Push every 4 hours  influenza   Vaccine 0.5 milliLiter(s) IntraMuscular once  levETIRAcetam   Injectable 500 milliGRAM(s) IV Push every 12 hours  magnesium sulfate  IVPB 1 Gram(s) IV Intermittent once  pantoprazole  Injectable 40 milliGRAM(s) IV Push every 12 hours  petrolatum Ophthalmic Ointment 1 Application(s) Both EYES two times a day  potassium chloride  10 mEq/100 mL IVPB 10 milliEquivalent(s) IV Intermittent every 1 hour    MEDICATIONS  (PRN):  sodium chloride 0.9% lock flush 10 milliLiter(s) IV Push every 1 hour PRN Pre/post blood products, medications, blood draw, and to maintain line patency      Pertinent Labs: 04-15 Na144 mmol/L Glu 125 mg/dL[H] K+ 3.3 mmol/L[L] Cr  3.08 mg/dL[H] BUN 65 mg/dL[H] 04-15 Phos 6.5 mg/dL[H] 04-15 Alb 2.5 g/dL[L] 04-04 Chol --    LDL --    HDL --      Triglycerides, Serum: 364 mg/dL (04-04-25 @ 00:35)  Triglycerides, Serum: 348 mg/dL (03-24-25 @ 18:00)      Anthropometrics  Weights per RN flowsheet: 94.5kg (4/15), 92.3kg (4/14), 108.6kg (4/13), 109.1kg (4/12), 108.8kg / 110.3kg (4/11), 109.4kg / 110.9kg (4/8)  Weight Assessment: -16.4kg (15%) weight loss x1 week period of time; patient has been on HD via KIRSTIE espinoza, fluid shifts. Patient likely with true weight loss, however, secondary to prolonged NPO status (x10 days ongoing)  Height: 175cm    Physical Assessment, per flowsheets:  Edema: 3+ (left leg, right leg)  Pressure Injury: None    Estimated Needs:   [X] No change since previous assessment  Weight Used: Ideal Body Weight 160lb / 72.7kg  Energy Needs: 1818-2181kcal (based on 25-30kcal/kg)  Protein Needs: 116-131gms (based on 1.6-1.8gms/kg)    Previous Nutrition Diagnosis: [X] Altered Nutrition Related Labs    Nutrition Diagnosis is [X] ongoing    New Nutrition Diagnosis: [X] not applicable     Education: [X] Not applicable secondary to patient with global anoxic brain injury    Interventions:   - Defer nutrition plan of care to medical team based on goals of care discussion and decisions of patient/patient's family  --> If alternate means of nutrition are to be initiated, please reconsult nutrition for recommendations  - Defer fluid management as per medical team    Monitor & Evaluate: Nutrition plan of care, nutrition related lab values, weight trends, BMs/GI distress, hydration status, skin integrity.    RD remains available, please consult as needed.    Nany Chicas MS RDN CDN  Available on [employer] Teams (Preferred) or Pager #[phone] NUTRITION FOLLOW UP NOTE    Patient is seen for a follow-up nutrition assessment.    SOURCE: [X] Other (Chart Review)    Medical Course: Per chart review, patient is a 44y Male with PMH HTN, Gout, marijuana dependence, sick cell disease, prior acute chest syndrome, iron overload, and RUE DVT (12/2024) who presented to [facility] for anemia concerning for sickle cell crisis. S/p ureteroscopy with L kidney biopsy (3/19), post-operative course complicated by acute hemorrhagic shock, PEA arrest with ROSC. Currently with acute renal failure on HD and global anoxic brain injury abd urothelial cancer. S/p tracheostomy (4/4) and pending [facility] discussions.    Diet Order: NPO (04-12-25 @ 07:25)  NPO after Midnight: NPO Start Date: 10-Apr-2025 / NPO Start Time: 23:59 (04-10-25 @ 17:14)      Nutrition Course:  - MAP >65mmHg. S/p tracheostomy placement (4/4/25). Patient on IV hydration support. Patient with global anoxic brain injury, unable to participate in assessment. No family present at bedside. Patient continues with prolonged NPO status since 4/6/25 (x10 days ongoing). Given patient's prolonged NPO status, significant weight loss, and severe edema, patient now meets criteria for severe protein calorie malnutrition in the context of acute illness/injury. Per chart review, noted IR documentation (4/6), "IR consulted for PEG placement. No safe window for endoscopic placement as per GI... Given no safe window for endoscopic placement, recommend surgical consultation." Planned for [facility] family meeting on Wednesday (4/16/25), please clarify goals of care as it relates to nutrition. S/p L IJ shiley placement (4/11), last HD session provided 4/11 per nephro documentation. Per review of labs, noted with hyperphosphatemia and hypokalemia. Triglycerides elevated (>300mg/dL).     GI Distress: No report of nausea/vomiting/diarrhea/constipation.   Bowel Movement: 4/13/25 per RN flowsheet. Not noted to be on a bowel regimen.       Pertinent Medications: MEDICATIONS  (STANDING):  albuterol/ipratropium for Nebulization 3 milliLiter(s) Nebulizer every 6 hours  chlorhexidine 2% Cloths 1 Application(s) Topical daily  dextrose 5% + lactated ringers. 1000 milliLiter(s) (60 mL/Hr) IV Continuous <Continuous>  dextrose 50% Injectable 25 Gram(s) IV Push once  diazepam  Injectable 10 milliGRAM(s) IV Push every 4 hours  influenza   Vaccine 0.5 milliLiter(s) IntraMuscular once  levETIRAcetam   Injectable 500 milliGRAM(s) IV Push every 12 hours  magnesium sulfate  IVPB 1 Gram(s) IV Intermittent once  pantoprazole  Injectable 40 milliGRAM(s) IV Push every 12 hours  petrolatum Ophthalmic Ointment 1 Application(s) Both EYES two times a day  potassium chloride  10 mEq/100 mL IVPB 10 milliEquivalent(s) IV Intermittent every 1 hour    MEDICATIONS  (PRN):  sodium chloride 0.9% lock flush 10 milliLiter(s) IV Push every 1 hour PRN Pre/post blood products, medications, blood draw, and to maintain line patency      Pertinent Labs: 04-15 Na144 mmol/L Glu 125 mg/dL[H] K+ 3.3 mmol/L[L] Cr  3.08 mg/dL[H] BUN 65 mg/dL[H] 04-15 Phos 6.5 mg/dL[H] 04-15 Alb 2.5 g/dL[L] 04-04 Chol --    LDL --    HDL --      Triglycerides, Serum: 364 mg/dL (04-04-25 @ 00:35)  Triglycerides, Serum: 348 mg/dL (03-24-25 @ 18:00)      Anthropometrics  Weights per RN flowsheet: 94.5kg (4/15), 92.3kg (4/14), 108.6kg (4/13), 109.1kg (4/12), 108.8kg / 110.3kg (4/11), 109.4kg / 110.9kg (4/8)  Weight Assessment: -16.4kg (15%) weight loss x1 week period of time; patient has been on HD via KIRSTIE espinoza, fluid shifts. Patient likely with true weight loss, however, secondary to prolonged NPO status (x10 days ongoing)  Height: 175cm    Physical Assessment, per flowsheets:  Edema: 3+ (left leg, right leg)  Pressure Injury: None    Estimated Needs:   [X] No change since previous assessment  Weight Used: Ideal Body Weight 160lb / 72.7kg  Energy Needs: 1818-2181kcal (based on 25-30kcal/kg)  Protein Needs: 116-131gms (based on 1.6-1.8gms/kg)    Previous Nutrition Diagnosis: [X] Altered Nutrition Related Labs    Nutrition Diagnosis is [X] ongoing    New Nutrition Diagnosis: [X] Severe protein calorie malnutrition related to acute illness/injury as evidenced by </=50% estimated energy needs for >/=5 days, >2% weight loss x1 week, and severe edema.  Goal: Patient will meet >75% estimated energy requirements.     Education: [X] Not applicable secondary to patient with global anoxic brain injury    Interventions:   - Defer nutrition plan of care to medical team based on goals of care discussion and decisions of patient/patient's family  --> If alternate means of nutrition are to be initiated, please reconsult nutrition for recommendations  - Defer fluid management as per medical team    Monitor & Evaluate: Nutrition plan of care, nutrition related lab values, weight trends, BMs/GI distress, hydration status, skin integrity.    RD remains available, please consult as needed.    Nany Chicas MS RDN CDN  Available on [employer] Teams (Preferred) or Pager #[phone] NUTRITION FOLLOW UP NOTE    Patient is seen for a follow-up nutrition assessment.    SOURCE: [X] Other (Chart Review)    Medical Course: Per chart review, patient is a 44y Male with PMH HTN, Gout, marijuana dependence, sick cell disease, prior acute chest syndrome, iron overload, and RUE DVT (12/2024) who presented to [facility] for anemia concerning for sickle cell crisis. S/p ureteroscopy with L kidney biopsy (3/19), post-operative course complicated by acute hemorrhagic shock, PEA arrest with ROSC. Currently with acute renal failure on HD and global anoxic brain injury abd urothelial cancer. S/p tracheostomy (4/4) and pending [facility] discussions.    Diet Order: NPO (04-12-25 @ 07:25)  NPO after Midnight: NPO Start Date: 10-Apr-2025 / NPO Start Time: 23:59 (04-10-25 @ 17:14)      Nutrition Course:  - MAP >65mmHg. S/p tracheostomy placement (4/4/25). Patient on IV hydration support. Patient with global anoxic brain injury, unable to participate in assessment. No family present at bedside. Patient continues with prolonged NPO status since 4/6/25 (x10 days ongoing). Given patient's prolonged NPO status, significant weight loss, NFPE findings (documented below), and severe edema, patient now meets criteria for severe protein calorie malnutrition in the context of acute illness/injury. Per chart review, noted IR documentation (4/6), "IR consulted for PEG placement. No safe window for endoscopic placement as per GI... Given no safe window for endoscopic placement, recommend surgical consultation." Planned for [facility] family meeting on Wednesday (4/16/25), please clarify goals of care as it relates to nutrition. S/p L IJ shiley placement (4/11), last HD session provided 4/11 per nephro documentation. Per review of labs, noted with hyperphosphatemia and hypokalemia. Triglycerides elevated (>300mg/dL).     GI Distress: No report of nausea/vomiting/diarrhea/constipation.   Bowel Movement: 4/13/25 per RN flowsheet. Not noted to be on a bowel regimen.       Pertinent Medications: MEDICATIONS  (STANDING):  albuterol/ipratropium for Nebulization 3 milliLiter(s) Nebulizer every 6 hours  chlorhexidine 2% Cloths 1 Application(s) Topical daily  dextrose 5% + lactated ringers. 1000 milliLiter(s) (60 mL/Hr) IV Continuous <Continuous>  dextrose 50% Injectable 25 Gram(s) IV Push once  diazepam  Injectable 10 milliGRAM(s) IV Push every 4 hours  influenza   Vaccine 0.5 milliLiter(s) IntraMuscular once  levETIRAcetam   Injectable 500 milliGRAM(s) IV Push every 12 hours  magnesium sulfate  IVPB 1 Gram(s) IV Intermittent once  pantoprazole  Injectable 40 milliGRAM(s) IV Push every 12 hours  petrolatum Ophthalmic Ointment 1 Application(s) Both EYES two times a day  potassium chloride  10 mEq/100 mL IVPB 10 milliEquivalent(s) IV Intermittent every 1 hour    MEDICATIONS  (PRN):  sodium chloride 0.9% lock flush 10 milliLiter(s) IV Push every 1 hour PRN Pre/post blood products, medications, blood draw, and to maintain line patency      Pertinent Labs: 04-15 Na144 mmol/L Glu 125 mg/dL[H] K+ 3.3 mmol/L[L] Cr  3.08 mg/dL[H] BUN 65 mg/dL[H] 04-15 Phos 6.5 mg/dL[H] 04-15 Alb 2.5 g/dL[L] 04-04 Chol --    LDL --    HDL --      Triglycerides, Serum: 364 mg/dL (04-04-25 @ 00:35)  Triglycerides, Serum: 348 mg/dL (03-24-25 @ 18:00)      Anthropometrics  Weights per RN flowsheet: 94.5kg (4/15), 92.3kg (4/14), 108.6kg (4/13), 109.1kg (4/12), 108.8kg / 110.3kg (4/11), 109.4kg / 110.9kg (4/8)  Weight Assessment: -16.4kg (15%) weight loss x1 week period of time; patient has been on HD via KIRSTIE espinoza, fluid shifts. Patient likely with true weight loss, however, secondary to prolonged NPO status (x10 days ongoing)  Height: 175cm    Physical Assessment, per flowsheets:  Edema: 3+ (left leg, right leg)  Pressure Injury: None  Nutrition Focused Physical Exam (NFPE):  Muscle: [X] Temples (Moderate)  [X] Clavicle (Mild)  Subcutaneous Fat: [X] Orbital (Moderate)    Estimated Needs:   [X] No change since previous assessment  Weight Used: Ideal Body Weight 160lb / 72.7kg  Energy Needs: 1818-2181kcal (based on 25-30kcal/kg)  Protein Needs: 116-131gms (based on 1.6-1.8gms/kg)    Previous Nutrition Diagnosis: [X] Altered Nutrition Related Labs    Nutrition Diagnosis is [X] ongoing    New Nutrition Diagnosis: [X] Severe protein calorie malnutrition related to acute illness/injury as evidenced by </=50% estimated energy needs for >/=5 days, >2% weight loss x1 week, and severe edema.  Goal: Patient will meet >75% estimated energy requirements.     Education: [X] Not applicable secondary to patient with global anoxic brain injury    Interventions:   - Defer nutrition plan of care to medical team based on goals of care discussion and decisions of patient/patient's family  --> If alternate means of nutrition are to be initiated, please reconsult nutrition for recommendations  - Defer fluid management as per medical team    Monitor & Evaluate: Nutrition plan of care, nutrition related lab values, weight trends, BMs/GI distress, hydration status, skin integrity.    RD remains available, please consult as needed.    Nany Chicas MS RDN CDN  Available on [employer] Teams (Preferred) or Pager #[phone]

## 2025-04-16 DIAGNOSIS — K92.2 GASTROINTESTINAL HEMORRHAGE, UNSPECIFIED: ICD-10-CM

## 2025-04-16 LAB
ALBUMIN SERPL ELPH-MCNC: 2.5 G/DL — LOW (ref 3.3–5)
ALP SERPL-CCNC: 140 U/L — HIGH (ref 40–120)
ALT FLD-CCNC: 43 U/L — HIGH (ref 4–41)
ANION GAP SERPL CALC-SCNC: 14 MMOL/L — SIGNIFICANT CHANGE UP (ref 7–14)
ANION GAP SERPL CALC-SCNC: 16 MMOL/L — HIGH (ref 7–14)
APTT BLD: 28.4 SEC — SIGNIFICANT CHANGE UP (ref 24.5–35.6)
AST SERPL-CCNC: 128 U/L — HIGH (ref 4–40)
BASOPHILS # BLD AUTO: 0.24 K/UL — HIGH (ref 0–0.2)
BASOPHILS NFR BLD AUTO: 1.2 % — SIGNIFICANT CHANGE UP (ref 0–2)
BILIRUB SERPL-MCNC: 7.1 MG/DL — HIGH (ref 0.2–1.2)
BUN SERPL-MCNC: 56 MG/DL — HIGH (ref 7–23)
BUN SERPL-MCNC: 60 MG/DL — HIGH (ref 7–23)
CA-I BLD-SCNC: 1.12 MMOL/L — LOW (ref 1.15–1.29)
CALCIUM SERPL-MCNC: 8.1 MG/DL — LOW (ref 8.4–10.5)
CALCIUM SERPL-MCNC: 8.3 MG/DL — LOW (ref 8.4–10.5)
CHLORIDE SERPL-SCNC: 113 MMOL/L — HIGH (ref 98–107)
CHLORIDE SERPL-SCNC: 117 MMOL/L — HIGH (ref 98–107)
CK SERPL-CCNC: 25 U/L — LOW (ref 30–200)
CO2 SERPL-SCNC: 19 MMOL/L — LOW (ref 22–31)
CO2 SERPL-SCNC: 19 MMOL/L — LOW (ref 22–31)
CREAT SERPL-MCNC: 2.57 MG/DL — HIGH (ref 0.5–1.3)
CREAT SERPL-MCNC: 2.82 MG/DL — HIGH (ref 0.5–1.3)
EGFR: 27 ML/MIN/1.73M2 — LOW
EGFR: 27 ML/MIN/1.73M2 — LOW
EGFR: 30 ML/MIN/1.73M2 — LOW
EGFR: 30 ML/MIN/1.73M2 — LOW
EOSINOPHIL # BLD AUTO: 1.21 K/UL — HIGH (ref 0–0.5)
EOSINOPHIL NFR BLD AUTO: 5.8 % — SIGNIFICANT CHANGE UP (ref 0–6)
GAS PNL BLDV: SIGNIFICANT CHANGE UP
GLUCOSE BLDC GLUCOMTR-MCNC: 125 MG/DL — HIGH (ref 70–99)
GLUCOSE SERPL-MCNC: 113 MG/DL — HIGH (ref 70–99)
GLUCOSE SERPL-MCNC: 127 MG/DL — HIGH (ref 70–99)
HCT VFR BLD CALC: 19.3 % — CRITICAL LOW (ref 39–50)
HGB BLD-MCNC: 6.7 G/DL — CRITICAL LOW (ref 13–17)
IANC: 16.05 K/UL — HIGH (ref 1.8–7.4)
IMM GRANULOCYTES NFR BLD AUTO: 0.7 % — SIGNIFICANT CHANGE UP (ref 0–0.9)
INR BLD: 1.56 RATIO — HIGH (ref 0.85–1.16)
LYMPHOCYTES # BLD AUTO: 1.68 K/UL — SIGNIFICANT CHANGE UP (ref 1–3.3)
LYMPHOCYTES # BLD AUTO: 8.1 % — LOW (ref 13–44)
MAGNESIUM SERPL-MCNC: 1.7 MG/DL — SIGNIFICANT CHANGE UP (ref 1.6–2.6)
MCHC RBC-ENTMCNC: 30.5 PG — SIGNIFICANT CHANGE UP (ref 27–34)
MCHC RBC-ENTMCNC: 34.7 G/DL — SIGNIFICANT CHANGE UP (ref 32–36)
MCV RBC AUTO: 87.7 FL — SIGNIFICANT CHANGE UP (ref 80–100)
MONOCYTES # BLD AUTO: 1.44 K/UL — HIGH (ref 0–0.9)
MONOCYTES NFR BLD AUTO: 6.9 % — SIGNIFICANT CHANGE UP (ref 2–14)
NEUTROPHILS # BLD AUTO: 16.05 K/UL — HIGH (ref 1.8–7.4)
NEUTROPHILS NFR BLD AUTO: 77.3 % — HIGH (ref 43–77)
NRBC # BLD AUTO: 0 K/UL — SIGNIFICANT CHANGE UP (ref 0–0)
NRBC # FLD: 0 K/UL — SIGNIFICANT CHANGE UP (ref 0–0)
NRBC BLD AUTO-RTO: 0 /100 WBCS — SIGNIFICANT CHANGE UP (ref 0–0)
PHOSPHATE SERPL-MCNC: 5.3 MG/DL — HIGH (ref 2.5–4.5)
PLATELET # BLD AUTO: 261 K/UL — SIGNIFICANT CHANGE UP (ref 150–400)
POTASSIUM SERPL-MCNC: 3.3 MMOL/L — LOW (ref 3.5–5.3)
POTASSIUM SERPL-MCNC: 3.8 MMOL/L — SIGNIFICANT CHANGE UP (ref 3.5–5.3)
POTASSIUM SERPL-SCNC: 3.3 MMOL/L — LOW (ref 3.5–5.3)
POTASSIUM SERPL-SCNC: 3.8 MMOL/L — SIGNIFICANT CHANGE UP (ref 3.5–5.3)
PROT SERPL-MCNC: 6.7 G/DL — SIGNIFICANT CHANGE UP (ref 6–8.3)
PROTHROM AB SERPL-ACNC: 18 SEC — HIGH (ref 9.9–13.4)
RBC # BLD: 2.2 M/UL — LOW (ref 4.2–5.8)
RBC # FLD: 19 % — HIGH (ref 10.3–14.5)
SODIUM SERPL-SCNC: 148 MMOL/L — HIGH (ref 135–145)
SODIUM SERPL-SCNC: 150 MMOL/L — HIGH (ref 135–145)
WBC # BLD: 20.77 K/UL — HIGH (ref 3.8–10.5)
WBC # FLD AUTO: 20.77 K/UL — HIGH (ref 3.8–10.5)

## 2025-04-16 PROCEDURE — 99232 SBSQ HOSP IP/OBS MODERATE 35: CPT | Mod: 25

## 2025-04-16 PROCEDURE — 99498 ADVNCD CARE PLAN ADDL 30 MIN: CPT

## 2025-04-16 PROCEDURE — 99497 ADVNCD CARE PLAN 30 MIN: CPT | Mod: 25

## 2025-04-16 PROCEDURE — 99232 SBSQ HOSP IP/OBS MODERATE 35: CPT | Mod: GC

## 2025-04-16 PROCEDURE — 99291 CRITICAL CARE FIRST HOUR: CPT | Mod: GC

## 2025-04-16 RX ORDER — ACETAMINOPHEN 500 MG/5ML
1000 LIQUID (ML) ORAL ONCE
Refills: 0 | Status: COMPLETED | OUTPATIENT
Start: 2025-04-16 | End: 2025-04-16

## 2025-04-16 RX ORDER — SODIUM CHLORIDE 9 G/1000ML
1000 INJECTION, SOLUTION INTRAVENOUS
Refills: 0 | Status: COMPLETED | OUTPATIENT
Start: 2025-04-16 | End: 2025-04-16

## 2025-04-16 RX ADMIN — DIAZEPAM 10 MILLIGRAM(S): 2 TABLET ORAL at 10:11

## 2025-04-16 RX ADMIN — Medication 100 MILLIEQUIVALENT(S): at 02:06

## 2025-04-16 RX ADMIN — Medication 40 MILLIGRAM(S): at 17:42

## 2025-04-16 RX ADMIN — Medication 100 MILLIEQUIVALENT(S): at 23:29

## 2025-04-16 RX ADMIN — Medication 1 APPLICATION(S): at 17:43

## 2025-04-16 RX ADMIN — Medication 100 MILLIEQUIVALENT(S): at 10:11

## 2025-04-16 RX ADMIN — IPRATROPIUM BROMIDE AND ALBUTEROL SULFATE 3 MILLILITER(S): .5; 2.5 SOLUTION RESPIRATORY (INHALATION) at 16:00

## 2025-04-16 RX ADMIN — DIAZEPAM 10 MILLIGRAM(S): 2 TABLET ORAL at 21:03

## 2025-04-16 RX ADMIN — DIAZEPAM 10 MILLIGRAM(S): 2 TABLET ORAL at 05:06

## 2025-04-16 RX ADMIN — IPRATROPIUM BROMIDE AND ALBUTEROL SULFATE 3 MILLILITER(S): .5; 2.5 SOLUTION RESPIRATORY (INHALATION) at 09:20

## 2025-04-16 RX ADMIN — Medication 100 MILLIEQUIVALENT(S): at 12:07

## 2025-04-16 RX ADMIN — IPRATROPIUM BROMIDE AND ALBUTEROL SULFATE 3 MILLILITER(S): .5; 2.5 SOLUTION RESPIRATORY (INHALATION) at 20:36

## 2025-04-16 RX ADMIN — SODIUM CHLORIDE 100 MILLILITER(S): 9 INJECTION, SOLUTION INTRAVENOUS at 19:34

## 2025-04-16 RX ADMIN — Medication 1000 MILLIGRAM(S): at 21:17

## 2025-04-16 RX ADMIN — Medication 100 MILLIEQUIVALENT(S): at 14:47

## 2025-04-16 RX ADMIN — Medication 1 APPLICATION(S): at 12:07

## 2025-04-16 RX ADMIN — SODIUM CHLORIDE 60 MILLILITER(S): 9 INJECTION, SOLUTION INTRAVENOUS at 10:11

## 2025-04-16 RX ADMIN — Medication 400 MILLIGRAM(S): at 21:02

## 2025-04-16 RX ADMIN — DIAZEPAM 10 MILLIGRAM(S): 2 TABLET ORAL at 02:05

## 2025-04-16 RX ADMIN — LEVETIRACETAM 500 MILLIGRAM(S): 10 INJECTION, SOLUTION INTRAVENOUS at 17:42

## 2025-04-16 RX ADMIN — IPRATROPIUM BROMIDE AND ALBUTEROL SULFATE 3 MILLILITER(S): .5; 2.5 SOLUTION RESPIRATORY (INHALATION) at 03:28

## 2025-04-16 RX ADMIN — DIAZEPAM 10 MILLIGRAM(S): 2 TABLET ORAL at 17:42

## 2025-04-16 RX ADMIN — LEVETIRACETAM 500 MILLIGRAM(S): 10 INJECTION, SOLUTION INTRAVENOUS at 05:06

## 2025-04-16 RX ADMIN — Medication 40 MILLIGRAM(S): at 05:06

## 2025-04-16 RX ADMIN — Medication 1 APPLICATION(S): at 05:07

## 2025-04-16 RX ADMIN — DIAZEPAM 10 MILLIGRAM(S): 2 TABLET ORAL at 14:46

## 2025-04-16 RX ADMIN — SODIUM CHLORIDE 100 MILLILITER(S): 9 INJECTION, SOLUTION INTRAVENOUS at 14:47

## 2025-04-16 NOTE — PROGRESS NOTE ADULT - TIME BILLING
Time spent for extensive review of the physical chart, electronic medical record, and documentation to obtain collateral information including but not limited to:  [x ] Inpatient records (ED, H&P, primary team, and consultants if applicable, care coordination)  [x ] Inpatient values/results (biomarkers, immunoassays, imaging, and microbiology results)  [x ] Current or proposed treatment plans  [x  ] Discussion with the primary team  [x ] Discussion with the patient, surrogate decision maker, or family  [x] 90 mins spent discussing goc with patient's family and coordinating care     Time spent: >125min

## 2025-04-16 NOTE — PROGRESS NOTE ADULT - PROBLEM SELECTOR PLAN 4
O2 Sat 88% on Room Air on arrival to ER, placed on 4L NC, asymptomatic. resolved shortly after admission   -currently satting mid 90s on RA   - CTA w/ no PE,  Enlarged main pulmonary artery may reflect pulmonary arterial hypertension  - CTA also shows Right upper lobe new patchy nodular opacity concerning for pneumonia, clinically no e/o PNA. will monitor off Abx's  - Repeat chest CT in one month as outpatient, to exclude lesion  - Incentive Spirometer
Patient s/p intermittent HD, now shiley removed   > Appreciate nephro recs.
PPSV 10%  Patient was independent of ADLs prior to admission   Patient currently intubated- off sedation, off pressors
O2 Sat 88% on Room Air on arrival to ER, placed on 4L NC, asymptomatic. resolved shortly after admission   -currently satting mid 90s on RA   - CTA w/ no PE,  Enlarged main pulmonary artery may reflect pulmonary arterial hypertension  - CTA also shows Right upper lobe new patchy nodular opacity concerning for pneumonia, clinically no e/o PNA. will monitor off Abx's  - Repeat chest CT in one month as outpatient, to exclude lesion  - Incentive Spirometer
O2 Sat 88% on Room Air on arrival to ER, placed on 4L NC, asymptomatic. resolved shortly after admission   -currently satting mid 90s on RA   - CTA w/ no PE,  Enlarged main pulmonary artery may reflect pulmonary arterial hypertension  - CTA also shows Right upper lobe new patchy nodular opacity concerning for pneumonia, clinically no e/o PNA. will monitor off Abx's  - Repeat chest CT in one month as outpatient, to exclude lesion  - Incentive Spirometer
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 Seen by Urology as outpatient and is scheduled for cystoscopy, left retrograde pyelogram left uteroscopy with laser ablation / biopsy , left ureteral stent on 3/19/2025  - Consulted Urology, per urology - is on OR schedule for Wednesday at 12
PPSV 10%  Patient was independent of ADLs prior to admission   Patient currently intubated- off sedation, off pressors
PPSV 10%  Patient was independent of ADLs prior to admission   Patient currently intubated- off sedation, off pressors

## 2025-04-16 NOTE — PROGRESS NOTE ADULT - PROBLEM SELECTOR PLAN 1
Pt. with MATA in setting of hemorrhagic shock and PEA arrest. Pt. with most likely ATN. Scr on admission was 1.25 (3/15/25). Scr progressively worsened to 6.04 on 3/24/25. Renal US done on 3/20/25 showed large left renal subcapsular hematoma. Pt. received HD from 3/24/25 to 3/29/25 for oliguric kidney failure. Pt. restarted on HD 4/4/25 for metabolic acidosis and uremia. Last HD treatment was on 4/11/25. LIJ-NTDC was removed 4/15/25. Labs from today reviewed. Scr improving to 2.8 today. Pt. non-oliguric with ~2.4L of UOP in last 24 hours. No further need for dialysis. Pt needs free water repletion, if not possible then can give hypotonic fluids. Pt. with mild hypokalemia, replete potassium. Monitor labs and UOP. Avoid nephrotoxins. Dose medications as per eGFR.        Eldon Hamilton  Nephrology Fellow  Feel free to contact me on TEAMS  After 5 pm and on weekends please contact the on-call Fellow. Pt. with MATA in setting of hemorrhagic shock and PEA arrest. Pt. with most likely ATN. Scr on admission was 1.25 (3/15/25). Scr progressively worsened to 6.04 on 3/24/25. Renal US done on 3/20/25 showed large left renal subcapsular hematoma. Pt. received HD from 3/24/25 to 3/29/25 for oliguric kidney failure. Pt. restarted on HD 4/4/25 for metabolic acidosis and uremia. Last HD treatment was on 4/11/25. HD catheter was removed 4/15/25. Labs from today reviewed. MATA resolving at present. Scr decreased to 2.82 today. Pt. non-oliguric with ~2.4L of UOP in last 24 hours. Pt. with mild hypokalemia, replete potassium. Monitor labs and UOP. Avoid nephrotoxins. Dose medications as per eGFR.        Eldon Hamilton  Nephrology Fellow  Feel free to contact me on TEAMS  After 5 pm and on weekends please contact the on-call Fellow.

## 2025-04-16 NOTE — PROGRESS NOTE ADULT - PROBLEM SELECTOR PROBLEM 4
Acute hypoxemic respiratory failure
Acute hypoxemic respiratory failure
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
Acute hypoxemic respiratory failure
Functional quadriplegia
MATA (acute kidney injury)
Functional quadriplegia
Functional quadriplegia

## 2025-04-16 NOTE — PROGRESS NOTE ADULT - PROBLEM SELECTOR PLAN 1
Pt s/p PEA arrest on 3/20. Pt currently intubated.   > MR brain concerning for hypoxic ischemic injury prognosis is guarded at this time and current scan is suggestive of poor neurological recovery and prolonged vegetative state.   > 4/16: Family requesting repeat MRI as they feel this would help guide them in making decisions for Alex.   > Appreciate Neurology recs- trend enolase, s/p VEEG showed severe cerebral dysfunction  > Per discussion with MICU team, family would like to review imaging with Neurologist.    > Patient on keppra 500mg tid, diazepam 10mg q4hr ATC   > Patient has been off sedation since 3/26 with no improvement in mental status   > Weaned off pressors   > Appreciate MICU care.

## 2025-04-16 NOTE — PROGRESS NOTE ADULT - SUBJECTIVE AND OBJECTIVE BOX
[facility] Geriatrics and Palliative Care  Eve Tavares Palliative Care Attending  Contact Info: Page [phone] (including Nights/Weekends), message on Microsoft Teams (Eve Tavares), or leave  at Palliative Office [phone] (non-urgent)   Date of [id number] @ 14:50    SUBJECTIVE AND OBJECTIVE: Patient seen this AM, unresponsive, no mental status. Pt with trach.     Indication for Geriatrics and Palliative Care Services/INTERVAL HPI: goc in setting of cardiac arrest, c/b anoxic brain injury     OVERNIGHT EVENTS:  > 4/16: Shiley removed.   > 4/1: Reviewed GOC note from Neurology team.   > 3/28: Patient with persistent fevers.   > 3/27: Patient febrile overnight s/p cooling blankets and tylenol.       DNR on chart:  Allergies    penicillin (Pruritus)  hydroxyurea (Other)  piperacillin-tazobactam (Urticaria)  ceftriaxone (Anaphylaxis)    Intolerances    MEDICATIONS  (STANDING):  albuterol/ipratropium for Nebulization 3 milliLiter(s) Nebulizer every 6 hours  chlorhexidine 2% Cloths 1 Application(s) Topical daily  dextrose 5% + sodium chloride 0.45%. 1000 milliLiter(s) (100 mL/Hr) IV Continuous <Continuous>  dextrose 50% Injectable 25 Gram(s) IV Push once  diazepam  Injectable 10 milliGRAM(s) IV Push every 4 hours  influenza   Vaccine 0.5 milliLiter(s) IntraMuscular once  levETIRAcetam   Injectable 500 milliGRAM(s) IV Push every 12 hours  pantoprazole  Injectable 40 milliGRAM(s) IV Push every 12 hours  petrolatum Ophthalmic Ointment 1 Application(s) Both EYES two times a day    MEDICATIONS  (PRN):  sodium chloride 0.9% lock flush 10 milliLiter(s) IV Push every 1 hour PRN Pre/post blood products, medications, blood draw, and to maintain line patency      ITEMS UNCHECKED ARE NOT PRESENT    PRESENT SYMPTOMS: [ x]Unable to self-report - see [ ] CPOT [ x] PAINADS [x ] RDOS  Source if other than patient:  [ ]Family   [ ]Team     Pain:  [ ]yes [ ]no  QOL impact -   Location -                    Aggravating factors -  Quality -  Radiation -  Timing-  Severity (0-10 scale):  Minimal acceptable level/ pain goal (0-10 scale):     CPOT:    https://www.[facility].org/getattachment/tsg95p39-8s4a-7x8g-7a1q-8241o0611t6i/Critical-Care-Pain-Observation-Tool-(CPOT)    Dyspnea:                           [ ]Mild [ ]Moderate [ ]Severe  Anxiety:                             [ ]Mild [ ]Moderate [ ]Severe  Fatigue:                             [ ]Mild [ ]Moderate [ ]Severe  Nausea:                             [ ]Mild [ ]Moderate [ ]Severe  Loss of appetite:              [ ]Mild [ ]Moderate [ ]Severe  Constipation:                    [ ]Mild [ ]Moderate [ ]Severe  Other Symptoms:  [ ]All other review of systems negative     PCSSQ[Palliative Care Spiritual Screening Question]   Severity (0-10):  Score of 4 or > indicate consideration of Chaplaincy referral.  Chaplaincy Referral: [ x] yes [ ] refused [ ] following [ ] deferred    Caregiver [place]? : [ x] yes [ ] no [ ] Deferred [ ] Declined             Social work referral [ ] Patient & Family Centered Care Referral [ ]  Anticipatory Grief present?:  [ x] yes [ ] no  [ ] Deferred                  Social work referral [ ] Patient & Family Centered Care Referral [ ]      PHYSICAL EXAM:  Vital Signs Last 24 Hrs  T(C): 37.8 (16 Apr 2025 12:00), Max: 38.2 (15 Apr 2025 18:00)  T(F): 100 (16 Apr 2025 12:00), Max: 100.8 (15 Apr 2025 18:00)  HR: 109 (16 Apr 2025 14:00) (99 - 120)  BP: 146/98 (16 Apr 2025 14:00) (139/97 - 178/90)  BP(mean): 111 (16 Apr 2025 14:00) (83 - 119)  RR: 25 (16 Apr 2025 14:00) (16 - 25)  SpO2: 100% (16 Apr 2025 14:00) (98% - 100%)    Parameters below as of 16 Apr 2025 14:00  Patient On (Oxygen Delivery Method): ventilator     I&O's Summary    15 Apr 2025 07:01  -  16 Apr 2025 07:00  --------------------------------------------------------  IN: 1540 mL / OUT: 2580 mL / NET: -1040 mL    16 Apr 2025 07:01  -  16 Apr 2025 14:50  --------------------------------------------------------  IN: 500 mL / OUT: 600 mL / NET: -100 mL       GENERAL: [ ]Cachexia    [ ]Alert  [ ]Oriented x   [ ]Lethargic  [x ]Unarousable  [ ]Verbal  [ ]Non-Verbal  Behavioral:   [ ] Anxiety  [ ] Delirium [ ] Agitation [x] Other  HEENT:  [ ]Normal   [ ]Dry mouth   [x ]ET Tube/Trach  [ ]Oral lesions  PULMONARY:   [ ]Clear [ ]Tachypnea  [ ]Audible excessive secretions   [ ]Rhonchi        [ ]Right [ ]Left [ ]Bilateral  [ ]Crackles        [ ]Right [ ]Left [ ]Bilateral  [ ]Wheezing     [ ]Right [ ]Left [ ]Bilateral  [x ]Diminished breath sounds [ ]right [ ]left [ ]bilateral  CARDIOVASCULAR:    [x ]Regular [ ]Irregular [ ]Tachy  [ ]Harley [ ]Murmur [ ]Other  GASTROINTESTINAL:  [ ]Soft  [ ]Distended   [ x]+BS  [ ]Non tender [x ]Tender  [ ]Other [ ]PEG [x ]OGT/ NGT  Last BM: rectal tube   GENITOURINARY:  [ ]Normal [ ] Incontinent   [ ]Oliguria/Anuria   [ x]Dave  MUSCULOSKELETAL:   [ ]Normal   [ ]Weakness  [x ]Bed/Wheelchair bound [x ]Edema  NEUROLOGIC:   [ ]No focal deficits  [ ]Cognitive impairment  [ ]Dysphagia [ ]Dysarthria [ ]Paresis [x ]Other - anoxic brain injury   SKIN: Please see flowsheets   [ ]Normal  [ ]Rash  [ x]Other- jaundiced   [ ]Pressure ulcer(s)       Present on admission [ ]y [ ]n    CRITICAL CARE:  [ ]Shock Present  [ ]Septic [ ]Cardiogenic [ ]Neurologic [ ]Hypovolemic  [ ]Vasopressors [ ]Inotropes  [ ]Respiratory failure present [ x]Mechanical Ventilation [ ]Non-invasive ventilatory support [ ]High-Flow Mode: AC/ CMV (Assist Control/ Continuous Mandatory Ventilation), RR (machine): 20, TV (machine): 460, FiO2: 30, PEEP: 6, ITime: 0.9, MAP: 11, PIP: 19  [ ]Acute  [ ]Chronic [ ]Hypoxic  [ ]Hypercarbic [ ]Other  [ ]Other organ failure     LABS:                        6.7    20.77 )-----------( 261      ( 16 Apr 2025 00:21 )             19.3   04-16    148[H]  |  113[H]  |  60[H]  ----------------------------<  127[H]  3.3[L]   |  19[L]  |  2.82[H]    Ca    8.3[L]      16 Apr 2025 00:21  Phos  5.3     04-16  Mg     1.70     04-16    TPro  6.7  /  Alb  2.5[L]  /  TBili  7.1[H]  /  DBili  x   /  AST  128[H]  /  ALT  43[H]  /  AlkPhos  140[H]  04-16  PT/INR - ( 16 Apr 2025 00:21 )   PT: 18.0 sec;   INR: 1.56 ratio         PTT - ( 16 Apr 2025 00:21 )  PTT:28.4 sec    Urinalysis Basic - ( 16 Apr 2025 00:21 )    Color: x / Appearance: x / SG: x / pH: x  Gluc: 127 mg/dL / Ketone: x  / Bili: x / Urobili: x   Blood: x / Protein: x / Nitrite: x   Leuk Esterase: x / RBC: x / WBC x   Sq Epi: x / Non Sq Epi: x / Bacteria: x      RADIOLOGY & ADDITIONAL STUDIES: no new     Protein Calorie Malnutrition Present: [ ]mild [ ]moderate [ ]severe [ ]underweight [ ]morbid obesity  https://www.andeal.org/vault/2440/web/files/ONC/Table_Clinical%20Characteristics%20to%20Document%20Malnutrition-White%20JV%20et%20al%202012.pdf    Height (cm): 175 (03-19-25 @ 10:38), 170.2 (03-12-25 @ 10:17), 175.3 (12-08-24 @ 21:08)  Weight (kg): 88.8 (03-19-25 @ 10:38), 90.7 (03-12-25 @ 10:17), 87.1 (12-08-24 @ 21:08)  BMI (kg/m2): 29 (03-19-25 @ 10:38), 31.3 (03-12-25 @ 10:17), 28.3 (12-08-24 @ 21:08)    [ ]PPSV2 < or = 30%  [ ]significant weight loss [ ]poor nutritional intake [ ]anasarca[ ]Artificial Nutrition    Other REFERRALS:  [ ]Hospice  [ ]Child Life  [ ]Social Work  [ ]Case management [ ]Holistic Therapy

## 2025-04-16 NOTE — PROGRESS NOTE ADULT - PROBLEM SELECTOR PROBLEM 6
Transaminitis
Encounter for palliative care
Transaminitis
Encounter for palliative care
Transaminitis
Counseling regarding goals of care
Encounter for palliative care
Transaminitis

## 2025-04-16 NOTE — GOALS OF CARE CONVERSATION - ADVANCED CARE PLANNING - CONVERSATION/DISCUSSION
Diagnosis/Prognosis/MOLST Discussed/Treatment Options
Diagnosis/Prognosis
Diagnosis/Prognosis/MOLST Discussed/Treatment Options

## 2025-04-16 NOTE — GOALS OF CARE CONVERSATION - ADVANCED CARE PLANNING - CONVERSATION DETAILS
Family discussion around 11 AM and discussed with family members (cousin, godfamily, sister, and brother) the guarded prognosis. Patient's family understands and wanted repeat brain MRI. Neurology Attending in attendance and explained that brain MRI not indicated, especially since chronic changes would not help in prognosis/diagnosis. Family still persistent and want to "continue fighting" for Alex and want to continue full code. Family also wants PEG placement and dispo to LTC and will decide then if they want to pursue different measure (possibly comfort). For now, patient full code and no limitations on interventions/medical management. Palliative Care in attendance.      PGY-1  Dawson Bella

## 2025-04-16 NOTE — GOALS OF CARE CONVERSATION - ADVANCED CARE PLANNING - CONVERSATION DETAILS
Family meeting held to discuss Alex's prognosis and explore next step options, including goals of care and potential treatment plans. Extensive review of Alex's clinical status was conducted with family, including current condition, prognosis, and response to treatment. Discussion aimed at providing clarity to support informed decision making.   Family are struggling to process and come to terms with the patient's prognosis. Family express desire to give Alex more time to assess for potential improvement. They remain hopeful and are not ready to make decisions about limiting interventions at this time. Family expresses interest in pursuing PEG placement and transition to long term care, in alignment with their hope for continued recovery and support.   Medical teams encouraged the family to consider DNR status in light of Alex's prognosis and clinical trajectory. Family meeting held to discuss Alex's prognosis and explore next step options, including goals of care and potential treatment plans. Extensive review of Alex's clinical status was conducted with family, including current condition, prognosis, and response to treatment. Discussion aimed at providing clarity to support informed decision making.   Family are struggling to process and come to terms with the patient's prognosis. Family express desire to give Alex more time to assess for potential improvement. They remain hopeful and are not ready to make decisions about limiting interventions at this time. Family expresses interest in pursuing PEG placement and transition to long term care, in alignment with their hope for continued recovery and support.   Medical teams encouraged the family to consider DNR status in light of Alex's prognosis and clinical trajectory. Family expressed understanding and was appreciative of the time spent discussing Alex's clinical condition and goc.

## 2025-04-16 NOTE — PROGRESS NOTE ADULT - ATTENDING COMMENTS
Patient seen and examined with ICU team at bedside during rounds after lab data, medical records and radiology reports reviewed. I have read and agreeable in general with the documented note, assessment, and management plan which reflects my opinions from bedside rounds.      TOLU VARGAS is a 44y Male with PMH of  Sickle cell disease c/b hx of ACS, DVT, presented with low Hg (5/3) s/p bladder biopsy complicated by hemorrhagic shock, course further complicated by cardiac arrest with multiorgan failure and anoxic brain injury      - no overnight events   - family meeting today      #GIB  #Gastric ulcer s/p scope on 4/11  - PEG tube pending GOC   # Anoxic encephalopathy 2/2 cardiac arrest   #myoclonus  - continue  Keppra  - continue valium   #AHRF s/p trach  - continue PS, PC if not tolerated  #Sickle cell  #AoC anemia   - c/tm goal  > 6  #Right UE DVT   - hold ac d/t GIB  # ATN  - no acute indication for iHD, catheter removed.   # Fluid Goal - even  # BM - daily  # POCUS -  see note   # DVT ppx - holding for GIB , SCDs  # GOC - plan for family meeting Wednesday       This patient is critically ill and required frequent bedside visits with therapy change. I have personally provided 39 minutes of critical care time concurrently with the resident/fellow/NP/PA. This time excludes time spent on separate procedures and time spent teaching. I have reviewed the resident/fellow/NP/PA’s documentation and I agree with the assessment and plan of care.    Brandon Koch MD  Interventional Pulmonology & Critical Care Medicine.

## 2025-04-16 NOTE — PROGRESS NOTE ADULT - ASSESSMENT
44M with history of sickle cell disease with multiple crises, urothelial ca, HTN, RUE DVT on Eliquis (held since 3/15), admitted for anemia on outpatient blood work. Underwent L ureteroscopy, L kidney mass biopsy, and stent placement 3/19/25 c/b perinephric hemorrhagic shock with PEA arrest, ROSC after 4 minutes however unclear anoxic time; s/p IR embolization of left renal artery 3/20.  EGD performed by GI for persistent melena on 4/11/25 where a gastric ulcer was cauterized and hemostatic agent was sprayed on 3x duodenal ulcers.  GOC discussion with family on 4/16/25 where family discussed wanting to establish feeding access for the patient for which surgical team was reconsulted.  Per GI and IR no safe window for percutaneous/endoscopic placement due to hepatomegaly.     Patient has been off pressors, lactate 1.5, has not needed blood products in past 24 hours.  Currently has brainstem reflexes.      RECS  - Continue GOC discussions with family   - Discussed open G tube placement with brother Bala Downey over the phone, he would like to discuss in person on 4/17/25 before making a final decision    Discussed with Dr. Timmy GARCIA Team Surgery  [id number]

## 2025-04-16 NOTE — PROGRESS NOTE ADULT - ATTENDING COMMENTS
Pt. with MATA. Scr decreased to 2.82 today. Pt. non-oliguric. Assessment and plan for MATA as outlined above. Monitor labs and urine output. Avoid nephrotoxins. Dose medications as per eGFR.

## 2025-04-16 NOTE — PROGRESS NOTE ADULT - SUBJECTIVE AND OBJECTIVE BOX
SUBJECTIVE:  Surgery team reconsulted for feeding access.  Per MICU, GOC discussion occurred today with multidisciplinary team where family expressed the desire to establish feeding access.  GI/IR did not feel there was a safe window for PEG placement.  Patient seen and examined at bedside.  No pressor requirement.  Tracheostomy and full vent support.  Abdomen soft, piror cholecystectomy scars noted, costal margin 9cm below diaphragm.  ROS unable to be achieved due to patient overall clinical condition.      OBJECTIVE:  Vital Signs Last 24 Hrs  T(C): 37.8 (16 Apr 2025 12:00), Max: 38.2 (15 Apr 2025 18:00)  T(F): 100 (16 Apr 2025 12:00), Max: 100.8 (15 Apr 2025 18:00)  HR: 106 (16 Apr 2025 15:08) (99 - 118)  BP: 151/91 (16 Apr 2025 15:00) (139/97 - 169/88)  BP(mean): 108 (16 Apr 2025 15:00) (83 - 119)  RR: 23 (16 Apr 2025 15:00) (16 - 25)  SpO2: 100% (16 Apr 2025 15:08) (98% - 100%)    Parameters below as of 16 Apr 2025 15:00  Patient On (Oxygen Delivery Method): ventilator      Physical Examination:  GEN: NAD  NEURO: AAOx0  PULM: trach on full vent support  ABD: soft, hepatomegaly, costal margin 9cm below diaphragm, well healed laparoscopic surgical scars

## 2025-04-16 NOTE — PROGRESS NOTE ADULT - SUBJECTIVE AND OBJECTIVE BOX
INTERVAL HPI/OVERNIGHT EVENTS: KIRSTIERoberta KAYLA espinoza removed yesterday and pending [facility] discussion today    SUBJECTIVE: Patient seen and examined at bedside. ROS could not be elicited.    VITAL SIGNS:  ICU Vital Signs Last 24 Hrs  T(C): 37.1 (16 Apr 2025 04:00), Max: 38.2 (15 Apr 2025 18:00)  T(F): 98.8 (16 Apr 2025 04:00), Max: 100.8 (15 Apr 2025 18:00)  HR: 106 (16 Apr 2025 07:26) (88 - 120)  BP: 157/102 (16 Apr 2025 07:00) (141/72 - 178/90)  BP(mean): 119 (16 Apr 2025 07:00) (83 - 119)  ABP: --  ABP(mean): --  RR: 21 (16 Apr 2025 07:00) (20 - 26)  SpO2: 100% (16 Apr 2025 07:26) (96% - 100%)    O2 Parameters below as of 16 Apr 2025 07:00  Patient On (Oxygen Delivery Method): ventilator    O2 Concentration (%): 30      Mode: AC/ CMV (Assist Control/ Continuous Mandatory Ventilation), RR (machine): 20, TV (machine): 460, FiO2: 30, PEEP: 6, ITime: 0.9, MAP: 11, PIP: 21  Plateau pressure:   P/F ratio:     04-15 @ 07:01  -  04-16 @ 07:00  --------------------------------------------------------  IN: 1540 mL / OUT: 2480 mL / NET: -940 mL    I & Os   7A-7P 7P-7A 24h 7A-   dextrose 5% + ...  0   IV PiggyBack 0 100 100 0   Total In  0   Stool 75 75 150 0   Urine: Indwell... 1180 1150 2330 0   Total Out 1255 1225 2480 0   TOTAL NET -535 -405 -940 0         CAPILLARY BLOOD GLUCOSE      ECG: reviewed.    PHYSICAL EXAM:  General: NAD, normal habitus, mild jaundice. Yawning and biting down on tube. Eyes a bit more open today,   Neuro: AAOx0, sedated & non responsive to noxious stimuli, closed eyes, 5 mm pupils, sluggishly reactive pupils b/l   HEENT: icteric sclerae b/l, tracheostomy clean/dry/intact. Eyes more open today  Chest: non-tender to palpation  Heart: S1/S2, RRR   Lungs: Coarse breath sounds  Abd: soft, non-tender, but slightly distended.  Ext: Generalized trace pitting edema, LUE wrapped, nodule on R 1st digit proximal phalanges   Pulses: radial 2+ b/l, dorsalis pedis 2+ b/l   Lines/tubes/drains: NGT, Dave, & Flexiseal, R. accucath + Midline (03/25)  Psych: unable to assess     MEDICATIONS:  MEDICATIONS  (STANDING):  albuterol/ipratropium for Nebulization 3 milliLiter(s) Nebulizer every 6 hours  chlorhexidine 2% Cloths 1 Application(s) Topical daily  dextrose 5% + lactated ringers. 1000 milliLiter(s) (60 mL/Hr) IV Continuous <Continuous>  dextrose 50% Injectable 25 Gram(s) IV Push once  diazepam  Injectable 10 milliGRAM(s) IV Push every 4 hours  influenza   Vaccine 0.5 milliLiter(s) IntraMuscular once  levETIRAcetam   Injectable 500 milliGRAM(s) IV Push every 12 hours  pantoprazole  Injectable 40 milliGRAM(s) IV Push every 12 hours  petrolatum Ophthalmic Ointment 1 Application(s) Both EYES two times a day    MEDICATIONS  (PRN):  sodium chloride 0.9% lock flush 10 milliLiter(s) IV Push every 1 hour PRN Pre/post blood products, medications, blood draw, and to maintain line patency      ALLERGIES:  Allergies    penicillin (Pruritus)  hydroxyurea (Other)  piperacillin-tazobactam (Urticaria)  ceftriaxone (Anaphylaxis)    Intolerances        LABS:                        6.7    20.77 )-----------( 261      ( 16 Apr 2025 00:21 )             19.3     04-16    148[H]  |  113[H]  |  60[H]  ----------------------------<  127[H]  3.3[L]   |  19[L]  |  2.82[H]    Ca    8.3[L]      16 Apr 2025 00:21  Phos  5.3     04-16  Mg     1.70     04-16    TPro  6.7  /  Alb  2.5[L]  /  TBili  7.1[H]  /  DBili  x   /  AST  128[H]  /  ALT  43[H]  /  AlkPhos  140[H]  04-16    PT/INR - ( 16 Apr 2025 00:21 )   PT: 18.0 sec;   INR: 1.56 ratio         PTT - ( 16 Apr 2025 00:21 )  PTT:28.4 sec  Urinalysis Basic - ( 16 Apr 2025 00:21 )    Color: x / Appearance: x / SG: x / pH: x  Gluc: 127 mg/dL / Ketone: x  / Bili: x / Urobili: x   Blood: x / Protein: x / Nitrite: x   Leuk Esterase: x / RBC: x / WBC x   Sq Epi: x / Non Sq Epi: x / Bacteria: x      ABG:      vBG:  pH, Venous: 7.39 (04-16-25 @ 00:21)  pCO2, Venous: 37 mmHg (04-16-25 @ 00:21)  pO2, Venous: 42 mmHg (04-16-25 @ 00:21)  HCO3, Venous: 22 mmol/L (04-16-25 @ 00:21)    Micro:    Culture - Blood (collected 04-08-25 @ 05:25)  Source: Blood Blood-Peripheral  Final Report (04-13-25 @ 11:00):    No growth at 5 days    Culture - Blood (collected 03-20-25 @ 10:04)  Source: Blood Blood-Peripheral  Final Report (03-25-25 @ 13:01):    No growth at 5 days        Culture - Sputum (collected 04-08-25 @ 08:00)  Source: ET Tube ET Tube  Gram Stain (04-08-25 @ 19:33):    Moderate Squamous epithelial cells per low power field    Few polymorphonuclear leukocytes per low power field    Few Yeast like cells per oil power field  Final Report (04-10-25 @ 09:06):    Commensal lay consistent with body site    Culture - Sputum (collected 03-26-25 @ 11:45)  Source: ET Tube ET Tube  Gram Stain (03-26-25 @ 22:28):    Few Squamous epithelial cells per low power field    Moderate polymorphonuclear leukocytes per low power field    Few Yeast per oil power field  Final Report (03-28-25 @ 08:15):    Commensal lay consistent with body site    Culture - Sputum (collected 03-22-25 @ 17:15)  Source: ET Tube ET Tube  Gram Stain (03-22-25 @ 23:42):    Numerous polymorphonuclear leukocytes per low power field    Rare Squamous epithelial cells per low power field    No organisms seen per oil power field  Final Report (03-24-25 @ 06:52):    Commensal lay consistent with body site         INTERVAL HPI/OVERNIGHT EVENTS: KIRSTIERoberta KAYLA espinoza removed yesterday and pending [facility] discussion today    SUBJECTIVE: Patient seen and examined at bedside. ROS could not be elicited.    VITAL SIGNS:  ICU Vital Signs Last 24 Hrs  T(C): 37.1 (16 Apr 2025 04:00), Max: 38.2 (15 Apr 2025 18:00)  T(F): 98.8 (16 Apr 2025 04:00), Max: 100.8 (15 Apr 2025 18:00)  HR: 106 (16 Apr 2025 07:26) (88 - 120)  BP: 157/102 (16 Apr 2025 07:00) (141/72 - 178/90)  BP(mean): 119 (16 Apr 2025 07:00) (83 - 119)  ABP: --  ABP(mean): --  RR: 21 (16 Apr 2025 07:00) (20 - 26)  SpO2: 100% (16 Apr 2025 07:26) (96% - 100%)    O2 Parameters below as of 16 Apr 2025 07:00  Patient On (Oxygen Delivery Method): ventilator    O2 Concentration (%): 30      Mode: AC/ CMV (Assist Control/ Continuous Mandatory Ventilation), RR (machine): 20, TV (machine): 460, FiO2: 30, PEEP: 6, ITime: 0.9, MAP: 11, PIP: 21  Plateau pressure:   P/F ratio:     04-15 @ 07:01  -  04-16 @ 07:00  --------------------------------------------------------  IN: 1540 mL / OUT: 2480 mL / NET: -940 mL    I & Os   7A-7P 7P-7A 24h 7A-   dextrose 5% + ...  0   IV PiggyBack 0 100 100 0   Total In  0   Stool 75 75 150 0   Urine: Indwell... 1180 1150 2330 0   Total Out 1255 1225 2480 0   TOTAL NET -535 -405 -940 0         CAPILLARY BLOOD GLUCOSE      ECG: reviewed.    PHYSICAL EXAM:  General: NAD, normal habitus, mild jaundice. Yawning and biting down on tube. Eyes a bit more open today,   Neuro: AAOx0, sedated & non responsive to noxious stimuli, closed eyes, 5 mm pupils, sluggishly reactive pupils b/l. Pupillary reflex, corneal reflex, and gag reflex intact.  HEENT: icteric sclerae b/l, tracheostomy clean/dry/intact. Eyes more open today  Chest: non-tender to palpation  Heart: S1/S2, RRR   Lungs: Coarse breath sounds  Abd: soft, non-tender, but slightly distended.  Ext: Generalized trace pitting edema, LUE wrapped, nodule on R 1st digit proximal phalanges   Pulses: radial 2+ b/l, dorsalis pedis 2+ b/l   Lines/tubes/drains: NGT, Dave, & Flexiseal, R. accucath + Midline (03/25)  Psych: unable to assess     MEDICATIONS:  MEDICATIONS  (STANDING):  albuterol/ipratropium for Nebulization 3 milliLiter(s) Nebulizer every 6 hours  chlorhexidine 2% Cloths 1 Application(s) Topical daily  dextrose 5% + lactated ringers. 1000 milliLiter(s) (60 mL/Hr) IV Continuous <Continuous>  dextrose 50% Injectable 25 Gram(s) IV Push once  diazepam  Injectable 10 milliGRAM(s) IV Push every 4 hours  influenza   Vaccine 0.5 milliLiter(s) IntraMuscular once  levETIRAcetam   Injectable 500 milliGRAM(s) IV Push every 12 hours  pantoprazole  Injectable 40 milliGRAM(s) IV Push every 12 hours  petrolatum Ophthalmic Ointment 1 Application(s) Both EYES two times a day    MEDICATIONS  (PRN):  sodium chloride 0.9% lock flush 10 milliLiter(s) IV Push every 1 hour PRN Pre/post blood products, medications, blood draw, and to maintain line patency      ALLERGIES:  Allergies    penicillin (Pruritus)  hydroxyurea (Other)  piperacillin-tazobactam (Urticaria)  ceftriaxone (Anaphylaxis)    Intolerances        LABS:                        6.7    20.77 )-----------( 261      ( 16 Apr 2025 00:21 )             19.3     04-16    148[H]  |  113[H]  |  60[H]  ----------------------------<  127[H]  3.3[L]   |  19[L]  |  2.82[H]    Ca    8.3[L]      16 Apr 2025 00:21  Phos  5.3     04-16  Mg     1.70     04-16    TPro  6.7  /  Alb  2.5[L]  /  TBili  7.1[H]  /  DBili  x   /  AST  128[H]  /  ALT  43[H]  /  AlkPhos  140[H]  04-16    PT/INR - ( 16 Apr 2025 00:21 )   PT: 18.0 sec;   INR: 1.56 ratio         PTT - ( 16 Apr 2025 00:21 )  PTT:28.4 sec  Urinalysis Basic - ( 16 Apr 2025 00:21 )    Color: x / Appearance: x / SG: x / pH: x  Gluc: 127 mg/dL / Ketone: x  / Bili: x / Urobili: x   Blood: x / Protein: x / Nitrite: x   Leuk Esterase: x / RBC: x / WBC x   Sq Epi: x / Non Sq Epi: x / Bacteria: x      ABG:      vBG:  pH, Venous: 7.39 (04-16-25 @ 00:21)  pCO2, Venous: 37 mmHg (04-16-25 @ 00:21)  pO2, Venous: 42 mmHg (04-16-25 @ 00:21)  HCO3, Venous: 22 mmol/L (04-16-25 @ 00:21)    Micro:    Culture - Blood (collected 04-08-25 @ 05:25)  Source: Blood Blood-Peripheral  Final Report (04-13-25 @ 11:00):    No growth at 5 days    Culture - Blood (collected 03-20-25 @ 10:04)  Source: Blood Blood-Peripheral  Final Report (03-25-25 @ 13:01):    No growth at 5 days        Culture - Sputum (collected 04-08-25 @ 08:00)  Source: ET Tube ET Tube  Gram Stain (04-08-25 @ 19:33):    Moderate Squamous epithelial cells per low power field    Few polymorphonuclear leukocytes per low power field    Few Yeast like cells per oil power field  Final Report (04-10-25 @ 09:06):    Commensal lay consistent with body site    Culture - Sputum (collected 03-26-25 @ 11:45)  Source: ET Tube ET Tube  Gram Stain (03-26-25 @ 22:28):    Few Squamous epithelial cells per low power field    Moderate polymorphonuclear leukocytes per low power field    Few Yeast per oil power field  Final Report (03-28-25 @ 08:15):    Commensal lay consistent with body site    Culture - Sputum (collected 03-22-25 @ 17:15)  Source: ET Tube ET Tube  Gram Stain (03-22-25 @ 23:42):    Numerous polymorphonuclear leukocytes per low power field    Rare Squamous epithelial cells per low power field    No organisms seen per oil power field  Final Report (03-24-25 @ 06:52):    Commensal lay consistent with body site

## 2025-04-16 NOTE — GOALS OF CARE CONVERSATION - ADVANCED CARE PLANNING - FAMILY/RELATIVE
Results are in chart  Message to dr Freddy Love lpn
Brother - Bala, Sister - Radha
Bala Downey (brother); Radha Downey (sister); Donal White (cousin/caregiver)
Brother- Bala Li
Brother- Bala ;  Cousin - April
Bala Downey (brother), Renee Downey (sister),  Shannan (cousin/godsister), Jazmin (godmother), April (cousin) and Saritha (godmother)

## 2025-04-16 NOTE — PROGRESS NOTE ADULT - PROBLEM SELECTOR PROBLEM 5
Counseling regarding goals of care
Hypertension
Functional quadriplegia
Counseling regarding goals of care
Counseling regarding goals of care

## 2025-04-16 NOTE — PROGRESS NOTE ADULT - PROBLEM SELECTOR PLAN 3
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 Seen by Urology as outpatient and is scheduled for cystoscopy, left retrograde pyelogram left uteroscopy with laser ablation / biopsy , left ureteral stent on 3/19/2025  - Consulted Urology, per urology - is on OR schedule for Wednesday at 12  -pre op eval: echo 1/25 normal LVEF. mild Pulm HTN, no significant valve disease.  no active cardiac symptoms. METS>4.  pending additional PRBC tomorrow. medically optimized for  procedure if Hgb>7
Now resolved. Serum K wnl 4.5 today.     If you have any questions, please feel free to contact me  Blayne Miller  Nephrology Fellow  [employer]/Page [phone]  (After 5pm or on weekends please page the on-call fellow)
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 Seen by Urology as outpatient and is scheduled for cystoscopy, left retrograde pyelogram left uteroscopy with laser ablation / biopsy , left ureteral stent on 3/19/2025  - Consulted Urology, per urology - is on OR schedule for Wednesday at 12  -pre op eval: echo 1/25 normal LVEF. mild Pulm HTN, no significant valve disease.  no active cardiac symptoms. METS>4.. medically optimized for  procedure
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 Seen by Urology as outpatient and is scheduled for cystoscopy, left retrograde pyelogram left uteroscopy with laser ablation / biopsy , left ureteral stent on 3/19/2025  - Consulted Urology, per urology - is on OR schedule for Wednesday at 12  -pre op eval: echo 1/25 normal LVEF. mild Pulm HTN, no significant valve disease.  no active cardiac symptoms. METS>4.. medically optimized for  procedure
Now resolved. Serum K wnl 3.9 today.     If you have any questions, please feel free to contact me  Blayne Miller  Nephrology Fellow  [employer]/Page [phone]  (After 5pm or on weekends please page the on-call fellow)
During last admission was found to have RUE DVT and was started on eliquis  - Duplex 12/31: Right Internal jugular Vein and left brachial vein  - Eliquis on hold due to upcoming  procedure scheduled for 3/19  - Repeat UE Duplex, pt also with LE edema, check LE doppler   - Full dose lovenox for now, last dose of eliquis 3/14/2025
Plan for HD as above.
Now resolved. Serum K wnl 4.4 today.     If you have any questions, please feel free to contact me  Blayne Miller  Nephrology Fellow  [employer]arr[employer]/Page [phone]  (After 5pm or on weekends please page the on-call fellow)
Now resolved. Serum K wnl 5 today.     If you have any questions, please feel free to contact me  Blayne iMller  Nephrology Fellow  [employer]/Page [phone]  (After 5pm or on weekends please page the on-call fellow)
s/p EGD, found to have gastric ulcers  > Appreciate GI recs
Patient s/p intermittent HD  > Appreciate nephro recs.

## 2025-04-16 NOTE — PROGRESS NOTE ADULT - NUTRITIONAL ASSESSMENT
This patient has been assessed with a concern for Malnutrition and has been determined to have a diagnosis/diagnoses of Severe protein-calorie malnutrition.    This patient is being managed with:   Diet NPO-  Entered: Apr 12 2025  7:25AM    Diet NPO after Midnight-     NPO Start Date: 10-Apr-2025   NPO Start Time: 23:59  Entered: Apr 10 2025  5:14PM

## 2025-04-16 NOTE — PROGRESS NOTE ADULT - PROBLEM SELECTOR PLAN 5
PPSV 10%  Patient was independent of ADLs prior to admission   Patient s/p trach, pending peg placement with surgery per family's wishes

## 2025-04-16 NOTE — PROGRESS NOTE ADULT - PROBLEM SELECTOR PLAN 6
Hyperbilirubinemia and transaminitis  - Likely in the setting of sickle cell crisis and underlying iron overload  - Cont Jadenu for iron overload (pharmacy only has supply for next 8 days)  - Monitor LFT's closely
In the event of newly developing, evolving, or worsening symptoms, please contact the Palliative Medicine team via pager (if the patient is at [facility] #[phone] or if the patient is at [facility] #[phone]) The Geriatric and Palliative Medicine service has coverage 24 hours a day/ 7 days a week to provide medical recommendations regarding symptom management needs via telephone.
In the event of newly developing, evolving, or worsening symptoms, please contact the Palliative Medicine team via pager (if the patient is at [facility] #[phone] or if the patient is at [facility] #[phone]) The Geriatric and Palliative Medicine service has coverage 24 hours a day/ 7 days a week to provide medical recommendations regarding symptom management needs via telephone.
FULL CODE - see separate goc note   Patient's brother, Bala and sister, Radha are patient's surrogate decision makers. Pt's cousin, Donal, is also very involved in patient's care. Family appears to make decisions together.    Caregiver SW referral   Chaplaincy referral- .  > 3/27: Multiple friends and family coming to visit patient. Per discussion with MICU team, family needs more time before making any further decisions. Emotional support provided to friends/family at bedside.  > 3/28: Only cousins at bedside. Emotional support provided. MICU team notified palliative team about potential family meeting next week so that the family can have the weekend to think about next steps.  > 4/1: See goc note above. FULL CODE- pending further c discussions and family meeting when Bala can coordinate a time.  4/16: EXTENSIVE Family meeting held with MICU team, Neurology team and Palliative care team. At this time, the goal is for peg tube placement. He remains Full code but Bala will consider DNR after Alex has peg tube placement. Family is interested in long term care facilities.
In the event of newly developing, evolving, or worsening symptoms, please contact the Palliative Medicine team via pager (if the patient is at [facility] #[phone] or if the patient is at [facility] #[phone]) The Geriatric and Palliative Medicine service has coverage 24 hours a day/ 7 days a week to provide medical recommendations regarding symptom management needs via telephone.

## 2025-04-16 NOTE — GOALS OF CARE CONVERSATION - ADVANCED CARE PLANNING - ADVANCED CARE PLANNING
Voluntary discussion occurred addressing advanced care planning

## 2025-04-16 NOTE — PROGRESS NOTE ADULT - PROBLEM SELECTOR PLAN 7
Na of 129 on admission now improved  - Likely in the setting of sickle cell crisis/hypovolemia  - can change to 1/2 NS  - F/up BMP and urine lytes  - F/up UA
Uses THC daily, both as recreational and for pain  - SBIRT consultation
Goals are clear. Palliative team signing off.     In the event of newly developing, evolving, or worsening symptoms, please contact the Palliative Medicine team via pager (if the patient is at [facility] #[phone] or if the patient is at [facility] #[phone]) The Geriatric and Palliative Medicine service has coverage 24 hours a day/ 7 days a week to provide medical recommendations regarding symptom management needs via telephone.

## 2025-04-16 NOTE — PROGRESS NOTE ADULT - SUBJECTIVE AND OBJECTIVE BOX
[facility] DIVISION OF KIDNEY DISEASES AND HYPERTENSION   FOLLOW UP NOTE    --------------------------------------------------------------------------------  Chief Complaint: MATA    24 hour events/subjective: Pt. was seen and examined in MICU today.  Pt. with tracheostomy, on mechanical ventilation. Unable to obtain ROS.       PAST HISTORY  --------------------------------------------------------------------------------  No significant changes to PMH, PSH, FHx, SHx, unless otherwise noted    ALLERGIES & MEDICATIONS  --------------------------------------------------------------------------------  Allergies    penicillin (Pruritus)  hydroxyurea (Other)  piperacillin-tazobactam (Urticaria)  ceftriaxone (Anaphylaxis)    Intolerances      Standing Inpatient Medications  albuterol/ipratropium for Nebulization 3 milliLiter(s) Nebulizer every 6 hours  chlorhexidine 2% Cloths 1 Application(s) Topical daily  dextrose 5% + lactated ringers. 1000 milliLiter(s) IV Continuous <Continuous>  dextrose 50% Injectable 25 Gram(s) IV Push once  diazepam  Injectable 10 milliGRAM(s) IV Push every 4 hours  influenza   Vaccine 0.5 milliLiter(s) IntraMuscular once  levETIRAcetam   Injectable 500 milliGRAM(s) IV Push every 12 hours  pantoprazole  Injectable 40 milliGRAM(s) IV Push every 12 hours  petrolatum Ophthalmic Ointment 1 Application(s) Both EYES two times a day  potassium chloride  10 mEq/100 mL IVPB 10 milliEquivalent(s) IV Intermittent every 1 hour    PRN Inpatient Medications  sodium chloride 0.9% lock flush 10 milliLiter(s) IV Push every 1 hour PRN      REVIEW OF SYSTEMS  --------------------------------------------------------------------------------  Unable to obtain ROS due to current clinical status.       VITALS/PHYSICAL EXAM  --------------------------------------------------------------------------------  T(C): 37.6 (04-16-25 @ 08:00), Max: 38.2 (04-15-25 @ 18:00)  HR: 116 (04-16-25 @ 10:00) (99 - 120)  BP: 150/82 (04-16-25 @ 10:00) (141/72 - 178/90)  RR: 24 (04-16-25 @ 10:00) (20 - 24)  SpO2: 100% (04-16-25 @ 10:00) (97% - 100%)  Wt(kg): --        04-15-25 @ 07:01  -  04-16-25 @ 07:00  --------------------------------------------------------  IN: 1540 mL / OUT: 2580 mL / NET: -1040 mL    04-16-25 @ 07:01  -  04-16-25 @ 10:42  --------------------------------------------------------  IN: 280 mL / OUT: 275 mL / NET: 5 mL        Physical Exam:  Gen: +ill appearing  HEENT: +tracheostomy  Pulm: +rhonchi b/l   CV: RRR, S1S2+  Abd: +BS, soft  Extremities: no LE edema  Neuro: Unresponsive to verbal stimuli  Skin: Warm  Vascular Access: None (Left IJ non-tunneled HD catheter -removed)      LABS/STUDIES  --------------------------------------------------------------------------------              6.7    20.77 >-----------<  261      [04-16-25 @ 00:21]              19.3     148  |  113  |  60  ----------------------------<  127      [04-16-25 @ 00:21]  3.3   |  19  |  2.82        Ca     8.3     [04-16-25 @ 00:21]      iCa    1.12     [04-16 @ 00:21]      Mg     1.70     [04-16-25 @ 00:21]      Phos  5.3     [04-16-25 @ 00:21]    TPro  6.7  /  Alb  2.5  /  TBili  7.1  /  DBili  x   /  AST  128  /  ALT  43  /  AlkPhos  140  [04-16-25 @ 00:21]    Creatinine Trend:  SCr 2.82 [04-16 @ 00:21]  SCr 3.08 [04-15 @ 00:14]  SCr 3.11 [04-14 @ 20:04]  SCr 3.26 [04-14 @ 02:13]  SCr 2.86 [04-13 @ 00:15] [facility] DIVISION OF KIDNEY DISEASES AND HYPERTENSION   FOLLOW UP NOTE    --------------------------------------------------------------------------------  Chief Complaint: MATA    24 hour events/subjective: Pt. was seen and examined in MICU today.  Pt. with tracheostomy, on mechanical ventilation. Unable to obtain ROS.     PAST HISTORY  --------------------------------------------------------------------------------  No significant changes to PMH, PSH, FHx, SHx, unless otherwise noted    ALLERGIES & MEDICATIONS  --------------------------------------------------------------------------------  Allergies    penicillin (Pruritus)  hydroxyurea (Other)  piperacillin-tazobactam (Urticaria)  ceftriaxone (Anaphylaxis)    Intolerances    Standing Inpatient Medications  albuterol/ipratropium for Nebulization 3 milliLiter(s) Nebulizer every 6 hours  chlorhexidine 2% Cloths 1 Application(s) Topical daily  dextrose 5% + lactated ringers. 1000 milliLiter(s) IV Continuous <Continuous>  dextrose 50% Injectable 25 Gram(s) IV Push once  diazepam  Injectable 10 milliGRAM(s) IV Push every 4 hours  influenza   Vaccine 0.5 milliLiter(s) IntraMuscular once  levETIRAcetam   Injectable 500 milliGRAM(s) IV Push every 12 hours  pantoprazole  Injectable 40 milliGRAM(s) IV Push every 12 hours  petrolatum Ophthalmic Ointment 1 Application(s) Both EYES two times a day  potassium chloride  10 mEq/100 mL IVPB 10 milliEquivalent(s) IV Intermittent every 1 hour    PRN Inpatient Medications  sodium chloride 0.9% lock flush 10 milliLiter(s) IV Push every 1 hour PRN    REVIEW OF SYSTEMS  --------------------------------------------------------------------------------  Unable to obtain ROS due to current clinical status.     VITALS/PHYSICAL EXAM  --------------------------------------------------------------------------------  T(C): 37.6 (04-16-25 @ 08:00), Max: 38.2 (04-15-25 @ 18:00)  HR: 116 (04-16-25 @ 10:00) (99 - 120)  BP: 150/82 (04-16-25 @ 10:00) (141/72 - 178/90)  RR: 24 (04-16-25 @ 10:00) (20 - 24)  SpO2: 100% (04-16-25 @ 10:00) (97% - 100%)  Wt(kg): --    04-15-25 @ 07:01  -  04-16-25 @ 07:00  --------------------------------------------------------  IN: 1540 mL / OUT: 2580 mL / NET: -1040 mL    04-16-25 @ 07:01  -  04-16-25 @ 10:42  --------------------------------------------------------  IN: 280 mL / OUT: 275 mL / NET: 5 mL    Physical Exam:  Gen: +ill appearing  HEENT: +tracheostomy  Pulm: +rhonchi b/l   CV: RRR, S1S2+  Abd: +BS, soft  Extremities: no LE edema  Neuro: Unresponsive to verbal stimuli  Skin: Warm  Vascular Access: None (Left IJ non-tunneled HD catheter -removed)    LABS/STUDIES  --------------------------------------------------------------------------------              6.7    20.77 >-----------<  261      [04-16-25 @ 00:21]              19.3     148  |  113  |  60  ----------------------------<  127      [04-16-25 @ 00:21]  3.3   |  19  |  2.82        Ca     8.3     [04-16-25 @ 00:21]      iCa    1.12     [04-16 @ 00:21]      Mg     1.70     [04-16-25 @ 00:21]      Phos  5.3     [04-16-25 @ 00:21]    TPro  6.7  /  Alb  2.5  /  TBili  7.1  /  DBili  x   /  AST  128  /  ALT  43  /  AlkPhos  140  [04-16-25 @ 00:21]    Creatinine Trend:  SCr 2.82 [04-16 @ 00:21]  SCr 3.08 [04-15 @ 00:14]  SCr 3.11 [04-14 @ 20:04]  SCr 3.26 [04-14 @ 02:13]  SCr 2.86 [04-13 @ 00:15]

## 2025-04-16 NOTE — PROGRESS NOTE ADULT - ASSESSMENT
Assessment and Plan: 	  Patient is 44y Male with PMHx of sickle cell disease admitted for anemia concerning for sickle cell crisis s/p ureteroscopy w/ L kidney biopsy 3/19, post-operative course c/b acute hemorrhagic shock, PEA arrest with ROSC; currently with acute renal failure on HD and global anoxic brain injury and urothelial cancer; s/p tracheostomy and pending GOC discussions to decide on possible interventions and dispo. GI consulted for melena patient s/p EGD with cauterization of gastric ulcer & hemostatic spraying of three duodenal ulcers. Pending further GOC discussions.    NEURO:  #Global anoxic brain injury   AAOx0.  Guarded prognosis.   - Since no NGT for now, c/w IVP Valium 10 mg Q4H standing and 10 mg Q4H PRN for myoclonus (switch to Valium 40 mg PO TID when possible).   - Repeat CT B 3/24 showing diffuse sulcal effacement with increased intracranial pressure, and few patchy foci of cortical blurring   - 3/25 MRIB with diffuse global abnormal supratentorial cortical restricted diffusion consistent with global hypoxic injury     #Seizures  No further myoclonus of upper body off propofol  - Witness myoclonic jerking concerning for seizures iso anoxic brain injury  - s/p 2.5g keppra load & midazolam 2mg IV q8 PRN   - vEEG report: "Abundant myoclonic seizures in the setting of a severe diffuse/multifocal cerebral dysfunction which can be seen in the setting of sedative effect or due to an anoxic injury, with improvement after 20:50 suggesting a lowering of sedation"; will f/u with neuro recommendations. Repeat vEEG 3/24 showing severe cerebral dysfunction   -C/w levetiracetam 500 mg IV BID & Valium as above.   -Completely off propofol      CV:  #Shock - hemorrhagic shock s/p kidney biopsy s/p 5u pRBC, 3 plasma, 3 plt---RESOLVED  last dose of lovenox AC on 3/18 at 5PM   - Monitor cbc q24  - Hg goal >6 per heme/onc  - GI consulted given downtrending Hgb, melanotic stool, and positive stool occult blood feces. S/p EGD with cauterization of gastric ulcer & hemostatic spraying of three duodenal ulcers.       #Hx of VTE (R IJ thrombus, L brachial DVT)  - CTM, hold AC given hemorrhage  - Bullae present on arm with DVT  - Appreciate orthopedics hand surgery consult, not compartment syndrome     PULM:  #Acute hypoxic respiratory failure  #Pna- 2/2 ventilation requirement  #Tracheostomy   CXR with R lung field and left mid and lower lung field hazy opacities.  - continue with duonebs Q6H & s/p HTS  - 3/28 CXR showing increasing RUL consolidation   -4/4 bedside tracheostomy   -S/p Abx completion course as below and currently on meropenem 500 mg Q24H (04/02 - 04/07)  -C/w trach care (Size 7 portex percutaneous tracheostomy). Provide as much comfort as possible on vent settings (PC if PS not tolerated).    RENAL:  #Acute renal failure.   #Renal mass s/p biopsy -   #Acidosis  Oliguric & receiving Bumex IVP PRN per nephro recs  Acute renal failure s/p cardiac arrest most likely 2/2 ATN requiring intermittent HD with nephro following  - TOV 3/28, failed; replaced daniel on 4/1; Attempt TOV when possible pending GOCs  - Trend Cr, UOP, lytes  - S/p bicarb drip completion & sodium bicarbonate 650 mg po TIDcompletion  - Per pathology result of renal biopsy, noninvasive urothelial malignancy   - HD sessions per nephro. XANDER espinoza removed.  GI:  #Shock liver  Unchanged  LFTs. Jaundice with hyperbilirubinemia (direct).   - Trend liver enzymes  - RUQ US showing enlarged periportal and periaortic enlarged lymph nodes and enlarged left lateral liver lobe and CBD 9 mm.   - LFTs stable for now    #Occult blood in feces  Positive Occult blood with dropping Hgb daily below goal of 6 ( Hx of SS). Patient with melena during hospital stay requiring pRBC to meet goal and with inappropriate Hgb response.  S/p 10 U of pRBCs since the beginning of April given downtrending Hgb  Maintain active T & S  C/w PPI 40 mg IV BID for now.  GI consulted given downtrending Hgb, melanotic stool, and positive stool occult blood feces. S/p EGD with cauterization of gastric ulcer & hemostatic spraying of three duodenal ulcers.     #Diet:  - NPO & no NGT (Until 48 hrs after EGD). Hold all PO meds and seizure medications converted to IV.   - GI unable to place PEG due to hepatomegaly.   - Per IR consult: Given no safe window for endoscopic placement, recommend surgical consultation.   - Surgery consulted and pending [facility] discussion  - S/p EGD with no NGT. C/w Maintenance IVFs while NPO. Hold off on NGT for now until stools clear      HEMATOLOGIC/ONCOLGOGIC   #Sickle cell crisis--Resolved  #Acute blood loss anemia- 2/2 sickle cells and uremia vs critical illness vs GIB  Downtrending direct hyperbilirubinemia,  gradually & Increasing reticulocyte count.   - Heme following & recs appreciated  - D/c deferasirox due to current renal failure   - S/p 10 U of pRBC since beginning of April  - Occult blood positive. C/w PPI 40 mg IV BID for now.  - Transfuse as needed Hb >6 goal per Heme.  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   -Pending [facility] discussions  -CTM    #DVT prophylaxis - SCD  -Held Heparin SQ due to recent melena    Endo:  #JUAN    ID:  #Pneumoniae-  likely 2/2 ventilator related  Afebrile with downtrending leukocytosis. CXR showing increased RUL opacification.   - D/c  aztreonam 2000 mg IV qd ( started 3/22- 3/27)   - S/p vancomycin 1500 mg IV qd (4/2); 2nd dose 4/4, 3rd dose 4/5  - 4/2- CXR showing increasing RUL consolidation  - Completed 5d meropenem 500 mg IV qd (started 3/27-3/31); (4/2-4/7).   - CTM resp status    #Temperature  -If patient has fever (>101 F) would reculture (blood + urine) and remove daniel.    MSK:  #infiltration  Infiltration of sodium bicarb into left arm, now with arm distension and bullae. Elevated uric acid with nodule suggestive of podagra however no signs of active gout flare.   -Appreciate orthopedic hand surgery, not compartment syndrome   - CTM    Lines:   R. Midline & R. Accucath (03/25)    Ethics: Full Code   Pending: Discussion with family (Wednesday (04/16) @ 11 AM)  Palliative Team: Consulted Assessment and Plan: 	  Patient is 44y Male with PMHx of sickle cell disease admitted for anemia concerning for sickle cell crisis s/p ureteroscopy w/ L kidney biopsy 3/19, post-operative course c/b acute hemorrhagic shock, PEA arrest with ROSC; currently with acute renal failure on HD and global anoxic brain injury and urothelial cancer; s/p tracheostomy & GI consulted for melena patient s/p EGD with cauterization of gastric ulcer & hemostatic spraying of three duodenal ulcers. Family discussion done on 04/16/25 and family wants to maintain full code status with dispo to LTC/nursing home pending PEG placement.     NEURO:  #Global anoxic brain injury   AAOx0.  Guarded prognosis.   - Since no NGT for now, c/w IVP Valium 10 mg Q4H standing and 10 mg Q4H PRN for myoclonus (switch to Valium 40 mg PO TID when possible).   - Repeat CT B 3/24 showing diffuse sulcal effacement with increased intracranial pressure, and few patchy foci of cortical blurring   - 3/25 MRIB with diffuse global abnormal supratentorial cortical restricted diffusion consistent with global hypoxic injury     #Seizures  No further myoclonus of upper body off propofol  - Witness myoclonic jerking concerning for seizures iso anoxic brain injury  - s/p 2.5g keppra load & midazolam 2mg IV q8 PRN   - vEEG report: "Abundant myoclonic seizures in the setting of a severe diffuse/multifocal cerebral dysfunction which can be seen in the setting of sedative effect or due to an anoxic injury, with improvement after 20:50 suggesting a lowering of sedation"; will f/u with neuro recommendations. Repeat vEEG 3/24 showing severe cerebral dysfunction   -C/w levetiracetam 500 mg IV BID & Valium as above.   -Completely off propofol      CV:  #Shock - hemorrhagic shock s/p kidney biopsy s/p 5u pRBC, 3 plasma, 3 plt---RESOLVED  last dose of lovenox AC on 3/18 at 5PM   - Monitor cbc q24  - Hg goal >6 per heme/onc  - GI consulted given downtrending Hgb, melanotic stool, and positive stool occult blood feces. S/p EGD with cauterization of gastric ulcer & hemostatic spraying of three duodenal ulcers.       #Hx of VTE (R IJ thrombus, L brachial DVT)  - CTM, hold AC given hemorrhage  - Bullae present on arm with DVT  - Appreciate orthopedics hand surgery consult, not compartment syndrome     PULM:  #Acute hypoxic respiratory failure  #Pna- 2/2 ventilation requirement  #Tracheostomy   CXR with R lung field and left mid and lower lung field hazy opacities.  - continue with duonebs Q6H & s/p HTS  - 3/28 CXR showing increasing RUL consolidation   -4/4 bedside tracheostomy   -S/p Abx completion course as below and currently on meropenem 500 mg Q24H (04/02 - 04/07)  -C/w trach care (Size 7 portex percutaneous tracheostomy). Provide as much comfort as possible on vent settings (PC if PS not tolerated).    RENAL:  #Acute renal failure.   #Renal mass s/p biopsy -   #Acidosis  Oliguric & receiving Bumex IVP PRN per nephro recs  Acute renal failure s/p cardiac arrest most likely 2/2 ATN requiring intermittent HD with nephro following  - TOV 3/28, failed; replaced daniel on 4/1; Attempt TOV when possible pending GOCs  - Trend Cr, UOP, lytes  - S/p bicarb drip completion & sodium bicarbonate 650 mg po TIDcompletion  - Per pathology result of renal biopsy, noninvasive urothelial malignancy   - HD sessions per nephro. XANDER espinoza removed.    #Hypernatremia  D5 + 1/2  cc/hr for 6 hrs and reassess afterwards  Repeat BMP @ 7 PM    GI:  #Shock liver  Unchanged  LFTs. Jaundice with hyperbilirubinemia (direct).   - Trend liver enzymes  - RUQ US showing enlarged periportal and periaortic enlarged lymph nodes and enlarged left lateral liver lobe and CBD 9 mm.   - LFTs stable for now    #Occult blood in feces  Positive Occult blood with dropping Hgb daily below goal of 6 ( Hx of SS). Patient with melena during hospital stay requiring pRBC to meet goal and with inappropriate Hgb response.  S/p 10 U of pRBCs since the beginning of April given downtrending Hgb  Maintain active T & S  C/w PPI 40 mg IV BID for now.  GI consulted given downtrending Hgb, melanotic stool, and positive stool occult blood feces. S/p EGD with cauterization of gastric ulcer & hemostatic spraying of three duodenal ulcers.     #Diet:  - NPO & no NGT. Hold all PO meds and seizure medications converted to IV.   - GI unable to place PEG due to hepatomegaly.   - Per IR consult: Given no safe window for endoscopic placement, recommend surgical consultation.   - Surgery consulted and pending GOC discussion  - S/p GOC discussion (04/16/25) and patient will be full code. Will reconsult surgery for possible G/J tube placement      HEMATOLOGIC/ONCOLGOGIC   #Sickle cell crisis--Resolved  #Acute blood loss anemia- 2/2 sickle cells and uremia vs critical illness vs GIB  Downtrending direct hyperbilirubinemia,  gradually & Increasing reticulocyte count.   - Heme following & recs appreciated  - D/c deferasirox due to current renal failure   - S/p 10 U of pRBC since beginning of April  - Occult blood positive. C/w PPI 40 mg IV BID for now.  - Transfuse as needed Hb >6 goal per Heme.  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   -CTM    #DVT prophylaxis - SCD  -Held Heparin SQ due to recent melena    Endo:  #JUAN    ID:  #Pneumoniae-  likely 2/2 ventilator related  Afebrile with downtrending leukocytosis. CXR showing increased RUL opacification.   - D/c  aztreonam 2000 mg IV qd ( started 3/22- 3/27)   - S/p vancomycin 1500 mg IV qd (4/2); 2nd dose 4/4, 3rd dose 4/5  - 4/2- CXR showing increasing RUL consolidation  - Completed 5d meropenem 500 mg IV qd (started 3/27-3/31); (4/2-4/7).   - CTM resp status    #Temperature  -If patient has fever (>101 F) would reculture (blood + urine) and remove daniel.    MSK:  #infiltration  Infiltration of sodium bicarb into left arm, now with arm distension and bullae. Elevated uric acid with nodule suggestive of podagra however no signs of active gout flare.   -Appreciate orthopedic hand surgery, not compartment syndrome   - CTM    Lines:   R. Midline & R. Accucath (03/25)    Ethics: Full Code   GOC Discussion: Still full code and leaning towards LTC & pending PEG tube placement.  Palliative Team: Consulted

## 2025-04-16 NOTE — PROGRESS NOTE ADULT - PROBLEM SELECTOR PROBLEM 3
Hyperkalemia
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
Hyperkalemia
Deep vein thrombosis (DVT) of right upper extremity
GIB (gastrointestinal bleeding)
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
MATA (acute kidney injury)
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
MATA (acute kidney injury)
MATA (acute kidney injury)

## 2025-04-17 LAB
ALBUMIN SERPL ELPH-MCNC: 2.4 G/DL — LOW (ref 3.3–5)
ALP SERPL-CCNC: 128 U/L — HIGH (ref 40–120)
ALT FLD-CCNC: 36 U/L — SIGNIFICANT CHANGE UP (ref 4–41)
ANION GAP SERPL CALC-SCNC: 14 MMOL/L — SIGNIFICANT CHANGE UP (ref 7–14)
ANISOCYTOSIS BLD QL: SIGNIFICANT CHANGE UP
APTT BLD: 28.8 SEC — SIGNIFICANT CHANGE UP (ref 24.5–35.6)
APTT BLD: 41.7 SEC — HIGH (ref 24.5–35.6)
AST SERPL-CCNC: 97 U/L — HIGH (ref 4–40)
BASOPHILS # BLD AUTO: 0 K/UL — SIGNIFICANT CHANGE UP (ref 0–0.2)
BASOPHILS # BLD AUTO: 0.21 K/UL — HIGH (ref 0–0.2)
BASOPHILS NFR BLD AUTO: 0 % — SIGNIFICANT CHANGE UP (ref 0–2)
BASOPHILS NFR BLD AUTO: 1 % — SIGNIFICANT CHANGE UP (ref 0–2)
BILIRUB SERPL-MCNC: 6.3 MG/DL — HIGH (ref 0.2–1.2)
BUN SERPL-MCNC: 55 MG/DL — HIGH (ref 7–23)
CALCIUM SERPL-MCNC: 8.3 MG/DL — LOW (ref 8.4–10.5)
CHLORIDE SERPL-SCNC: 116 MMOL/L — HIGH (ref 98–107)
CO2 SERPL-SCNC: 18 MMOL/L — LOW (ref 22–31)
CREAT SERPL-MCNC: 2.55 MG/DL — HIGH (ref 0.5–1.3)
EGFR: 31 ML/MIN/1.73M2 — LOW
EGFR: 31 ML/MIN/1.73M2 — LOW
EOSINOPHIL # BLD AUTO: 0.2 K/UL — SIGNIFICANT CHANGE UP (ref 0–0.5)
EOSINOPHIL # BLD AUTO: 0.71 K/UL — HIGH (ref 0–0.5)
EOSINOPHIL NFR BLD AUTO: 0.9 % — SIGNIFICANT CHANGE UP (ref 0–6)
EOSINOPHIL NFR BLD AUTO: 3.4 % — SIGNIFICANT CHANGE UP (ref 0–6)
GAS PNL BLDV: SIGNIFICANT CHANGE UP
GIANT PLATELETS BLD QL SMEAR: PRESENT — SIGNIFICANT CHANGE UP
GLUCOSE BLDC GLUCOMTR-MCNC: 118 MG/DL — HIGH (ref 70–99)
GLUCOSE SERPL-MCNC: 107 MG/DL — HIGH (ref 70–99)
HCT VFR BLD CALC: 17 % — CRITICAL LOW (ref 39–50)
HCT VFR BLD CALC: 20.1 % — CRITICAL LOW (ref 39–50)
HGB BLD-MCNC: 5.8 G/DL — CRITICAL LOW (ref 13–17)
HGB BLD-MCNC: 6.7 G/DL — CRITICAL LOW (ref 13–17)
IANC: 16.55 K/UL — HIGH (ref 1.8–7.4)
IANC: 16.99 K/UL — HIGH (ref 1.8–7.4)
IMM GRANULOCYTES NFR BLD AUTO: 0.6 % — SIGNIFICANT CHANGE UP (ref 0–0.9)
INR BLD: 1.75 RATIO — HIGH (ref 0.85–1.16)
LYMPHOCYTES # BLD AUTO: 1.48 K/UL — SIGNIFICANT CHANGE UP (ref 1–3.3)
LYMPHOCYTES # BLD AUTO: 1.89 K/UL — SIGNIFICANT CHANGE UP (ref 1–3.3)
LYMPHOCYTES # BLD AUTO: 7.2 % — LOW (ref 13–44)
LYMPHOCYTES # BLD AUTO: 8.7 % — LOW (ref 13–44)
MACROCYTES BLD QL: SLIGHT — SIGNIFICANT CHANGE UP
MAGNESIUM SERPL-MCNC: 1.6 MG/DL — SIGNIFICANT CHANGE UP (ref 1.6–2.6)
MANUAL SMEAR VERIFICATION: SIGNIFICANT CHANGE UP
MCHC RBC-ENTMCNC: 29.6 PG — SIGNIFICANT CHANGE UP (ref 27–34)
MCHC RBC-ENTMCNC: 30.5 PG — SIGNIFICANT CHANGE UP (ref 27–34)
MCHC RBC-ENTMCNC: 33.3 G/DL — SIGNIFICANT CHANGE UP (ref 32–36)
MCHC RBC-ENTMCNC: 34.1 G/DL — SIGNIFICANT CHANGE UP (ref 32–36)
MCV RBC AUTO: 88.9 FL — SIGNIFICANT CHANGE UP (ref 80–100)
MCV RBC AUTO: 89.5 FL — SIGNIFICANT CHANGE UP (ref 80–100)
MONOCYTES # BLD AUTO: 0.93 K/UL — HIGH (ref 0–0.9)
MONOCYTES # BLD AUTO: 1.18 K/UL — HIGH (ref 0–0.9)
MONOCYTES NFR BLD AUTO: 4.3 % — SIGNIFICANT CHANGE UP (ref 2–14)
MONOCYTES NFR BLD AUTO: 5.7 % — SIGNIFICANT CHANGE UP (ref 2–14)
NEUTROPHILS # BLD AUTO: 16.99 K/UL — HIGH (ref 1.8–7.4)
NEUTROPHILS # BLD AUTO: 18.72 K/UL — HIGH (ref 1.8–7.4)
NEUTROPHILS NFR BLD AUTO: 82.1 % — HIGH (ref 43–77)
NEUTROPHILS NFR BLD AUTO: 86.1 % — HIGH (ref 43–77)
NRBC # BLD AUTO: 0.02 K/UL — HIGH (ref 0–0)
NRBC # BLD: 1 /100 WBCS — HIGH (ref 0–0)
NRBC # FLD: 0.02 K/UL — HIGH (ref 0–0)
NRBC BLD AUTO-RTO: 0 /100 WBCS — SIGNIFICANT CHANGE UP (ref 0–0)
NRBC BLD-RTO: 1 /100 WBCS — HIGH (ref 0–0)
OVALOCYTES BLD QL SMEAR: SLIGHT — SIGNIFICANT CHANGE UP
PHOSPHATE SERPL-MCNC: 5 MG/DL — HIGH (ref 2.5–4.5)
PLAT MORPH BLD: ABNORMAL
PLATELET # BLD AUTO: 254 K/UL — SIGNIFICANT CHANGE UP (ref 150–400)
PLATELET # BLD AUTO: 277 K/UL — SIGNIFICANT CHANGE UP (ref 150–400)
PLATELET COUNT - ESTIMATE: NORMAL — SIGNIFICANT CHANGE UP
POIKILOCYTOSIS BLD QL AUTO: SLIGHT — SIGNIFICANT CHANGE UP
POLYCHROMASIA BLD QL SMEAR: SIGNIFICANT CHANGE UP
POTASSIUM SERPL-MCNC: 3.7 MMOL/L — SIGNIFICANT CHANGE UP (ref 3.5–5.3)
POTASSIUM SERPL-SCNC: 3.7 MMOL/L — SIGNIFICANT CHANGE UP (ref 3.5–5.3)
PROT SERPL-MCNC: 6.5 G/DL — SIGNIFICANT CHANGE UP (ref 6–8.3)
PROTHROM AB SERPL-ACNC: 20.7 SEC — HIGH (ref 9.9–13.4)
RBC # BLD: 1.9 M/UL — LOW (ref 4.2–5.8)
RBC # BLD: 2.26 M/UL — LOW (ref 4.2–5.8)
RBC # FLD: 18.8 % — HIGH (ref 10.3–14.5)
RBC # FLD: 19.1 % — HIGH (ref 10.3–14.5)
RBC BLD AUTO: ABNORMAL
SODIUM SERPL-SCNC: 148 MMOL/L — HIGH (ref 135–145)
WBC # BLD: 20.69 K/UL — HIGH (ref 3.8–10.5)
WBC # BLD: 21.74 K/UL — HIGH (ref 3.8–10.5)
WBC # FLD AUTO: 20.69 K/UL — HIGH (ref 3.8–10.5)
WBC # FLD AUTO: 21.74 K/UL — HIGH (ref 3.8–10.5)

## 2025-04-17 PROCEDURE — 99232 SBSQ HOSP IP/OBS MODERATE 35: CPT | Mod: GC

## 2025-04-17 PROCEDURE — 93308 TTE F-UP OR LMTD: CPT | Mod: 26,GC

## 2025-04-17 PROCEDURE — 93321 DOPPLER ECHO F-UP/LMTD STD: CPT | Mod: 26,GC

## 2025-04-17 PROCEDURE — 99232 SBSQ HOSP IP/OBS MODERATE 35: CPT | Mod: 57,GC

## 2025-04-17 PROCEDURE — 71045 X-RAY EXAM CHEST 1 VIEW: CPT | Mod: 26

## 2025-04-17 PROCEDURE — 99291 CRITICAL CARE FIRST HOUR: CPT | Mod: GC,25

## 2025-04-17 PROCEDURE — 76604 US EXAM CHEST: CPT | Mod: 26,GC

## 2025-04-17 RX ORDER — HEPARIN SODIUM 1000 [USP'U]/ML
INJECTION INTRAVENOUS; SUBCUTANEOUS
Qty: 25000 | Refills: 0 | Status: DISCONTINUED | OUTPATIENT
Start: 2025-04-17 | End: 2025-04-18

## 2025-04-17 RX ORDER — SODIUM CHLORIDE 9 G/1000ML
1000 INJECTION, SOLUTION INTRAVENOUS
Refills: 0 | Status: DISCONTINUED | OUTPATIENT
Start: 2025-04-17 | End: 2025-04-18

## 2025-04-17 RX ORDER — SODIUM CHLORIDE 9 G/1000ML
1000 INJECTION, SOLUTION INTRAVENOUS
Refills: 0 | Status: DISCONTINUED | OUTPATIENT
Start: 2025-04-17 | End: 2025-04-17

## 2025-04-17 RX ORDER — MAGNESIUM SULFATE 500 MG/ML
2 SYRINGE (ML) INJECTION
Refills: 0 | Status: COMPLETED | OUTPATIENT
Start: 2025-04-17 | End: 2025-04-17

## 2025-04-17 RX ADMIN — DIAZEPAM 10 MILLIGRAM(S): 2 TABLET ORAL at 17:40

## 2025-04-17 RX ADMIN — Medication 1 APPLICATION(S): at 05:59

## 2025-04-17 RX ADMIN — HEPARIN SODIUM 1600 UNIT(S)/HR: 1000 INJECTION INTRAVENOUS; SUBCUTANEOUS at 10:51

## 2025-04-17 RX ADMIN — Medication 40 MILLIGRAM(S): at 17:43

## 2025-04-17 RX ADMIN — Medication 25 GRAM(S): at 04:13

## 2025-04-17 RX ADMIN — DIAZEPAM 10 MILLIGRAM(S): 2 TABLET ORAL at 02:20

## 2025-04-17 RX ADMIN — Medication 100 MILLIEQUIVALENT(S): at 00:43

## 2025-04-17 RX ADMIN — IPRATROPIUM BROMIDE AND ALBUTEROL SULFATE 3 MILLILITER(S): .5; 2.5 SOLUTION RESPIRATORY (INHALATION) at 07:15

## 2025-04-17 RX ADMIN — Medication 25 GRAM(S): at 06:00

## 2025-04-17 RX ADMIN — IPRATROPIUM BROMIDE AND ALBUTEROL SULFATE 3 MILLILITER(S): .5; 2.5 SOLUTION RESPIRATORY (INHALATION) at 04:19

## 2025-04-17 RX ADMIN — HEPARIN SODIUM 1600 UNIT(S)/HR: 1000 INJECTION INTRAVENOUS; SUBCUTANEOUS at 19:22

## 2025-04-17 RX ADMIN — Medication 1 APPLICATION(S): at 11:49

## 2025-04-17 RX ADMIN — DIAZEPAM 10 MILLIGRAM(S): 2 TABLET ORAL at 05:59

## 2025-04-17 RX ADMIN — SODIUM CHLORIDE 75 MILLILITER(S): 9 INJECTION, SOLUTION INTRAVENOUS at 11:04

## 2025-04-17 RX ADMIN — Medication 100 MILLIEQUIVALENT(S): at 02:19

## 2025-04-17 RX ADMIN — SODIUM CHLORIDE 75 MILLILITER(S): 9 INJECTION, SOLUTION INTRAVENOUS at 23:16

## 2025-04-17 RX ADMIN — DIAZEPAM 10 MILLIGRAM(S): 2 TABLET ORAL at 21:40

## 2025-04-17 RX ADMIN — IPRATROPIUM BROMIDE AND ALBUTEROL SULFATE 3 MILLILITER(S): .5; 2.5 SOLUTION RESPIRATORY (INHALATION) at 14:33

## 2025-04-17 RX ADMIN — DIAZEPAM 10 MILLIGRAM(S): 2 TABLET ORAL at 14:21

## 2025-04-17 RX ADMIN — LEVETIRACETAM 500 MILLIGRAM(S): 10 INJECTION, SOLUTION INTRAVENOUS at 05:59

## 2025-04-17 RX ADMIN — SODIUM CHLORIDE 50 MILLILITER(S): 9 INJECTION, SOLUTION INTRAVENOUS at 09:41

## 2025-04-17 RX ADMIN — IPRATROPIUM BROMIDE AND ALBUTEROL SULFATE 3 MILLILITER(S): .5; 2.5 SOLUTION RESPIRATORY (INHALATION) at 20:27

## 2025-04-17 RX ADMIN — DIAZEPAM 10 MILLIGRAM(S): 2 TABLET ORAL at 09:26

## 2025-04-17 RX ADMIN — Medication 40 MILLIGRAM(S): at 05:59

## 2025-04-17 RX ADMIN — HEPARIN SODIUM 1800 UNIT(S)/HR: 1000 INJECTION INTRAVENOUS; SUBCUTANEOUS at 20:01

## 2025-04-17 RX ADMIN — HEPARIN SODIUM 1800 UNIT(S)/HR: 1000 INJECTION INTRAVENOUS; SUBCUTANEOUS at 23:20

## 2025-04-17 RX ADMIN — Medication 100 MILLIEQUIVALENT(S): at 03:48

## 2025-04-17 RX ADMIN — LEVETIRACETAM 500 MILLIGRAM(S): 10 INJECTION, SOLUTION INTRAVENOUS at 17:43

## 2025-04-17 NOTE — CHART NOTE - NSCHARTNOTEFT_GEN_A_CORE
RCU Acceptance Note  6S 642   Attending Dr Dale    In brief, 44 year old male with PMHx of sickle cell disease admitted for anemia concerning for sickle cell crisis s/p ureteroscopy w/ L kidney biopsy and stent placement 3/19, post-operative course c/b acute hemorrhagic shock and PEA arrest x 4 minutes with ROSC. Pt s/p L renal angiogram with embolization on 3/20/25. Course further c/b anoxic brain injury due to arrest, acute renal failure s/p HD via L paulaley 3/24/25-3/29/25 and 4/15, GIB 2/2 gastric ulcer from NGT trauma s/p EGD 4/11 with cauterization, melanotic stools, R IJ and L brachial DVT, VAP, new dx of urothelial cancer from biopsy and hx of b/l UE line infiltrations. Patient s/p perc tracheostomy with 7 Portex on 4/4 and remains on ventilator. Patient transferred to RCU on 4/17.      Patient seen and examined on unit 6S. RCU Acceptance Note  6S 642   Attending Dr Dale    See MICU transfer note for hospital course     In brief, 44 year old male with PMHx of sickle cell disease admitted for anemia concerning for sickle cell crisis s/p ureteroscopy w/ L kidney biopsy and stent placement 3/19, post-operative course c/b acute hemorrhagic shock and PEA arrest x 4 minutes with ROSC. Pt s/p L renal angiogram with embolization on 3/20/25. Course further c/b anoxic brain injury due to arrest, acute renal failure s/p HD via L shiley 3/24/25-3/29/25 and 4/15, GIB 2/2 gastric ulcer from NGT trauma s/p EGD 4/11 with cauterization, melanotic stools, R IJ and L brachial DVT, VAP, new dx of urothelial cancer from biopsy and hx of b/l UE line infiltrations. Patient s/p perc tracheostomy with 7 Portex on 4/4 and remains on ventilator. Patient transferred to RCU on 4/17.      Patient seen and examined on unit 6S.    PHYSICAL EXAM:  General: NAD  HEENT: +tracheostomy tube is midline  Neck: neck supple, no JVD  Respiratory: +trach to ventilator, lungs cta b/l, no wheeze or rhonchi, no rales, no resp distress  Cardiovascular: normal s1, s2, RRR  Abdomen: soft, non tender  Extremities: no LE edema b/l, in SCDs   Skin: warm and dry  Neurological: AAO x 0   Psychiatry: no agitation     ICU Vital Signs Last 24 Hrs  T(C): 37.2 (17 Apr 2025 16:00), Max: 38.1 (16 Apr 2025 20:00)  T(F): 99 (17 Apr 2025 16:00), Max: 100.6 (16 Apr 2025 20:00)  HR: 94 (17 Apr 2025 17:15) (67 - 119)  BP: 153/88 (17 Apr 2025 16:00) (122/68 - 161/94)  BP(mean): 107 (17 Apr 2025 16:00) (82 - 113)  ABP: --  ABP(mean): --  RR: 20 (17 Apr 2025 16:00) (19 - 23)  SpO2: 100% (17 Apr 2025 17:15) (98% - 100%)    O2 Parameters below as of 17 Apr 2025 16:00  Patient On (Oxygen Delivery Method): ventilator    O2 Concentration (%): 30    A/P: 44 year old male with PMHx of sickle cell disease admitted for anemia concerning for sickle cell crisis s/p ureteroscopy w/ L kidney biopsy and stent placement 3/19, post-operative course c/b acute hemorrhagic shock and PEA arrest x 4 minutes with ROSC. Pt s/p L renal angiogram with embolization on 3/20/25. Course further c/b anoxic brain injury due to arrest, acute renal failure s/p HD via L shiley 3/24/25-3/29/25 and 4/15, GIB 2/2 gastric ulcer from NGT trauma s/p EGD 4/11 with cauterization, melanotic stools, R IJ and L brachial DVT, VAP, new dx of urothelial cancer from biopsy and hx of b/l UE line infiltrations. Patient s/p perc tracheostomy with 7 Portex on 4/4 and remains on ventilator. Patient transferred to RCU on 4/17.      NEURO:  # Global anoxic brain injury   - s/p cardiac arrest on 3/20 with ROSC   - Repeat CT B 3/24 showing diffuse sulcal effacement with increased intracranial pressure, and few patchy foci of cortical blurring   - 3/25 MRI brain with diffuse global abnormal supratentorial cortical restricted diffusion consistent with global hypoxic injury   - Continue on IVP Valium 10 mg Q4H   - AAOx 0    # Seizures  - Witness myoclonic jerking concerning for seizures iso anoxic brain injury  - s/p 2.5g keppra load & midazolam 2mg IV q8 PRN   - vEEG report: "Abundant myoclonic seizures in the setting of a severe diffuse/multifocal cerebral dysfunction which can be seen in the setting of sedative effect or due to an anoxic injury, with improvement after 20:50 suggesting a lowering of sedation"; will f/u with neuro recommendations. Repeat vEEG 3/24 showing severe cerebral dysfunction   - Continue on Levetiracetam and Valium  - s/p propofol in ICU and no further myoclonus reported    CV:  # History of hemorrhagic shock   - hemorrhagic shock s/p kidney biopsy s/p 5u pRBC, 3 plasma, 3 plt  - s/p L renal angiogram with embolization on 3/20/25.   - GI consulted given downtrending Hgb, melanotic stool, and positive stool occult blood feces.   - S/p EGD 4/11 with cauterization of gastric ulcer & hemostatic spraying of three duodenal ulcers.   - Gastric ulcer was due to NGT trauma   - Trend CBC and keep active type & screen   - Hg goal >6 per heme/onc    #Hx of VTE (R IJ thrombus, L brachial DVT)  - Heparin gtt  - Appreciate orthopedics hand surgery consult, not compartment syndrome. Signed off  - Hold heparin drip tonight after midnight for open G tube placement in AM on 4/18    PULM:  # Acute hypoxic respiratory failure  # Tracheostomy   -  4/4 s/p percutaneous tracheostomy 7 portex   - Continue on Vent / RR 20/ PEEP 6/ FiO2 30%, PS as able  - Continue on Duo nebs Q6H    RENAL:  # Acute renal failure.   # Renal mass s/p biopsy   - Scr on admission was 1.25 (3/15/25)  - Renal US done on (3/20/25) showed large L renal subcapsular hematoma.    - s/p L renal angiogram with embolization on 3/20/25.   - Scr progressively worsened to 6.04 on (3/24/25)  - s/p HD from 3/24/25 - 3/29/25 for oliguric kidney failure  - restarted on HD 4/4/25 for metabolic acidosis and uremia  - L IJ shiley removed on 4/15   - MATA now improving and patient non-oliguric   - Trend Strict I&Os     # Hypernatremia  - Continue on D5 at 75 cc x 20 hours  - Trend BMP     GI  # Shock liver  Unchanged  LFTs. Jaundice with hyperbilirubinemia (direct).   - Trend liver enzymes  - RUQ US showing enlarged periportal and periaortic enlarged lymph nodes and enlarged left lateral liver lobe and CBD 9 mm.   - LFTs stable for now    # Occult blood in feces  - Positive Occult blood with dropping Hgb daily below goal of 6 ( Hx of SS). Patient with melena during hospital stay requiring pRBC to meet goal and with inappropriate Hgb response.  - S/p 10 U of pRBCs since the beginning of April given downtrending Hgb  - Maintain active T & S  - Continue on Protonix 40 BID IV for now  - GI consulted given downtrending Hgb, melanotic stool, and positive stool occult blood feces.   - S/p EGD 4/11 with cauterization of gastric ulcer & hemostatic spraying of three duodenal ulcers. Gastric ulcer likely due to NGT trauma.     #Diet:  - NPO on IV fluids  - GI unable to place PEG due to hepatomegaly.   - Per IR consult: Given no safe window for endoscopic placement, recommend surgical consultation.   - Surgery reconsulted & will attempt Open G tube after discussion with brother tomorrow on 4/18    HEMATOLOGIC/ ONCOLOGIC   # Sickle cell crisis  # Acute blood loss anemia - 2/2 sickle cells and uremia vs critical illness vs GIB  Downtrending direct hyperbilirubinemia,  gradually & Increasing reticulocyte count.   - Heme following & recs appreciated  - Discontinued deferasirox due to current renal failure   - S/p 10 U of pRBC since beginning of April  - Transfuse as needed Hb >6 goal per Heme.  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   - heme onc consulted - discuss path and treatment outpatient depending on GOC    #DVT prophylaxis   - Heparin gtt  - Hold after midnight for procedure tomorrow 4/18     Endo:  # JUAN    ID:  # Pneumoniae -  likely 2/2 ventilator related  - Completed aztreonam 2000 mg IV QD ( started 3/22- 3/27)   - Completed vancomycin 1500 mg IV QD  (4/2); 2nd dose 4/4, 3rd dose 4/5  - 4/2 - CXR showing increasing RUL consolidation  - Completed meropenem 500 mg IV QD (started 3/27-3/31); (4/2-4/7)  - 4/17 Tracheostomy tube with persistent right-sided opacities with slow improvement since last study.  - Monitor leukocytosis and temperature curve     MSK:  # history of L arm infiltration  - Infiltration of sodium bicarb into left arm, now with arm distension and bullae. Elevated uric acid with nodule suggestive of podagra however no signs of active gout flare.   - Appreciate orthopedic hand surgery, not compartment syndrome. Signed off    # Blood Product Extravasation RUE  - noted in MICU during transfusion in AM on 4/17, blood products stopped and new PIV access obtained  - continue to monitor site     Lines:   - PIV  - Dave     Ethics: Full Code   Palliative Team: Consulted in MICU.     Dispo TBD RCU Acceptance Note  6S 642   Attending Dr Dale    See MICU transfer note for hospital course     In brief, 44 year old male with PMHx of sickle cell disease admitted for anemia concerning for sickle cell crisis s/p ureteroscopy w/ L kidney biopsy and stent placement 3/19, post-operative course c/b acute hemorrhagic shock and PEA arrest x 4 minutes with ROSC. Pt s/p L renal angiogram with embolization on 3/20/25. Course further c/b anoxic brain injury due to arrest, acute renal failure s/p HD via L shiley 3/24/25-3/29/25 and 4/15, GIB 2/2 gastric ulcer from NGT trauma s/p EGD 4/11 with cauterization, melanotic stools, R IJ and L brachial DVT, VAP, new dx of urothelial cancer from biopsy and hx of b/l UE line infiltrations. Patient s/p perc tracheostomy with 7 Portex on 4/4 and remains on ventilator. Patient transferred to RCU on 4/17.      Patient seen and examined on unit 6S.    PHYSICAL EXAM:  General: NAD  HEENT: +tracheostomy tube is midline  Neck: neck supple, no JVD  Respiratory: +trach to ventilator, lungs cta b/l, no wheeze or rhonchi, no rales, no resp distress  Cardiovascular: normal s1, s2, RRR  Abdomen: soft, non tender  Extremities: no LE edema b/l, in SCDs   Skin: warm and dry  Neurological: AAO x 0   Psychiatry: no agitation     ICU Vital Signs Last 24 Hrs  T(C): 37.2 (17 Apr 2025 16:00), Max: 38.1 (16 Apr 2025 20:00)  T(F): 99 (17 Apr 2025 16:00), Max: 100.6 (16 Apr 2025 20:00)  HR: 94 (17 Apr 2025 17:15) (67 - 119)  BP: 153/88 (17 Apr 2025 16:00) (122/68 - 161/94)  BP(mean): 107 (17 Apr 2025 16:00) (82 - 113)  ABP: --  ABP(mean): --  RR: 20 (17 Apr 2025 16:00) (19 - 23)  SpO2: 100% (17 Apr 2025 17:15) (98% - 100%)    O2 Parameters below as of 17 Apr 2025 16:00  Patient On (Oxygen Delivery Method): ventilator    O2 Concentration (%): 30    A/P:     NEURO:  # Global anoxic brain injury   - s/p cardiac arrest on 3/20 with ROSC   - Repeat CT B 3/24 showing diffuse sulcal effacement with increased intracranial pressure, and few patchy foci of cortical blurring   - 3/25 MRI brain with diffuse global abnormal supratentorial cortical restricted diffusion consistent with global hypoxic injury   - Continue on IVP Valium 10 mg Q4H   - AAOx 0    # Seizures  - Witness myoclonic jerking concerning for seizures iso anoxic brain injury  - s/p 2.5g keppra load & midazolam 2mg IV q8 PRN   - vEEG report: "Abundant myoclonic seizures in the setting of a severe diffuse/multifocal cerebral dysfunction which can be seen in the setting of sedative effect or due to an anoxic injury, with improvement after 20:50 suggesting a lowering of sedation"; will f/u with neuro recommendations. Repeat vEEG 3/24 showing severe cerebral dysfunction   - Continue on Levetiracetam and Valium  - s/p propofol in ICU and no further myoclonus reported    CV:  # History of hemorrhagic shock   - hemorrhagic shock s/p kidney biopsy s/p 5u pRBC, 3 plasma, 3 plt  - s/p L renal angiogram with embolization on 3/20/25.   - GI consulted given downtrending Hgb, melanotic stool, and positive stool occult blood feces.   - S/p EGD 4/11 with cauterization of gastric ulcer & hemostatic spraying of three duodenal ulcers.   - Gastric ulcer was due to NGT trauma   - Trend CBC and keep active type & screen   - Hg goal >6 per heme/onc    #Hx of VTE (R IJ thrombus, L brachial DVT)  - Heparin gtt  - Appreciate orthopedics hand surgery consult, not compartment syndrome. Signed off  - Hold heparin drip tonight after midnight for open G tube placement in AM on 4/18    PULM:  # Acute hypoxic respiratory failure  # Tracheostomy   -  4/4 s/p percutaneous tracheostomy 7 portex   - Continue on Vent / RR 20/ PEEP 6/ FiO2 30%, PS as able  - Continue on Duo nebs Q6H    RENAL:  # Acute renal failure.   # Renal mass s/p biopsy   - Scr on admission was 1.25 (3/15/25)  - Renal US done on (3/20/25) showed large L renal subcapsular hematoma.    - s/p L renal angiogram with embolization on 3/20/25.   - Scr progressively worsened to 6.04 on (3/24/25)  - s/p HD from 3/24/25 - 3/29/25 for oliguric kidney failure  - restarted on HD 4/4/25 for metabolic acidosis and uremia  - L IJ shiley removed on 4/15   - MATA now improving and patient non-oliguric   - Trend Strict I&Os     # Hypernatremia  - Continue on D5 at 75 cc x 20 hours  - Trend BMP     GI  # Shock liver  Unchanged  LFTs. Jaundice with hyperbilirubinemia (direct).   - Trend liver enzymes  - RUQ US showing enlarged periportal and periaortic enlarged lymph nodes and enlarged left lateral liver lobe and CBD 9 mm.   - LFTs stable for now    # Occult blood in feces  - Positive Occult blood with dropping Hgb daily below goal of 6 ( Hx of SS). Patient with melena during hospital stay requiring pRBC to meet goal and with inappropriate Hgb response.  - S/p 10 U of pRBCs since the beginning of April given downtrending Hgb  - Maintain active T & S  - Continue on Protonix 40 BID IV for now  - GI consulted given downtrending Hgb, melanotic stool, and positive stool occult blood feces.   - S/p EGD 4/11 with cauterization of gastric ulcer & hemostatic spraying of three duodenal ulcers. Gastric ulcer likely due to NGT trauma.     #Diet:  - NPO on IV fluids  - GI unable to place PEG due to hepatomegaly.   - Per IR consult: Given no safe window for endoscopic placement, recommend surgical consultation.   - Surgery reconsulted & will attempt Open G tube after discussion with brother tomorrow on 4/18    HEMATOLOGIC/ ONCOLOGIC   # Sickle cell crisis  # Acute blood loss anemia - 2/2 sickle cells and uremia vs critical illness vs GIB  Downtrending direct hyperbilirubinemia,  gradually & Increasing reticulocyte count.   - Heme following & recs appreciated  - Discontinued deferasirox due to current renal failure   - S/p 10 U of pRBC since beginning of April  - Transfuse as needed Hb >6 goal per Heme.  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   - heme onc consulted - discuss path and treatment outpatient depending on GOC    #DVT prophylaxis   - Heparin gtt  - Hold after midnight for procedure tomorrow 4/18     Endo:  # JUAN    ID:  # Pneumoniae -  likely 2/2 ventilator related  - Completed aztreonam 2000 mg IV QD ( started 3/22- 3/27)   - Completed vancomycin 1500 mg IV QD  (4/2); 2nd dose 4/4, 3rd dose 4/5  - 4/2 - CXR showing increasing RUL consolidation  - Completed meropenem 500 mg IV QD (started 3/27-3/31); (4/2-4/7)  - 4/17 Tracheostomy tube with persistent right-sided opacities with slow improvement since last study.  - Monitor leukocytosis and temperature curve     MSK:  # history of L arm infiltration  - Infiltration of sodium bicarb into left arm, now with arm distension and bullae. Elevated uric acid with nodule suggestive of podagra however no signs of active gout flare.   - Appreciate orthopedic hand surgery, not compartment syndrome. Signed off    # Blood Product Extravasation RUE  - noted in MICU during transfusion in AM on 4/17, blood products stopped and new PIV access obtained  - continue to monitor site     Lines:   - PIV  - Dave     Ethics: Full Code   Palliative Team: Consulted in MICU.     Dispo TBD

## 2025-04-17 NOTE — CHART NOTE - NSCHARTNOTEFT_GEN_A_CORE
Called to bedside by RN for concern of PRBCs leaking out of peripheral IV site. On assessment, blood surrounding PIV site, dripping onto gown. Ecchymosis present proximal to the IV site with surrounding edema. Blood products immediately stopped and switched to another access. Bedside ultrasound showing subcutaneous edema. New PIV placed under ultrasound guidance. Infiltrated IV removed. Will tx conservatively by elevating extremity, providing warm compress and will assess neurovascular status q1hr x 4 hrs. Continue monitoring for worsening signs/symptoms.     Nba Moreno PA-C  Critical Care

## 2025-04-17 NOTE — PROGRESS NOTE ADULT - ATTENDING COMMENTS
Pt. with MATA. Scr decreased to 2.55 today. Pt. non-oliguric. Assessment and plan for MATA as outlined above. Monitor labs and urine output. Avoid nephrotoxins. Dose medications as per eGFR.

## 2025-04-17 NOTE — PROGRESS NOTE ADULT - ASSESSMENT
· Assessment	  Assessment and Plan: 	  Patient is 44y Male with PMHx of sickle cell disease admitted for anemia concerning for sickle cell crisis s/p ureteroscopy w/ L kidney biopsy 3/19, post-operative course c/b acute hemorrhagic shock, PEA arrest with ROSC; currently with acute renal failure on HD and global anoxic brain injury and urothelial cancer; s/p tracheostomy and pending GOC discussions to decide on possible interventions and dispo. GI consulted for melena patient s/p EGD with cauterization of gastric ulcer & hemostatic spraying of three duodenal ulcers. Patient full code and pending open G tube placement.     NEURO:  #Global anoxic brain injury   AAOx0.  Guarded prognosis.   - Since no NGT for now, c/w IVP Valium 10 mg Q4H standing and 10 mg Q4H PRN for myoclonus (switch to Valium 40 mg PO TID when possible).   - Repeat CT B 3/24 showing diffuse sulcal effacement with increased intracranial pressure, and few patchy foci of cortical blurring   - 3/25 MRIB with diffuse global abnormal supratentorial cortical restricted diffusion consistent with global hypoxic injury     #Seizures  No further myoclonus of upper body off propofol  - Witness myoclonic jerking concerning for seizures iso anoxic brain injury  - s/p 2.5g keppra load & midazolam 2mg IV q8 PRN   - vEEG report: "Abundant myoclonic seizures in the setting of a severe diffuse/multifocal cerebral dysfunction which can be seen in the setting of sedative effect or due to an anoxic injury, with improvement after 20:50 suggesting a lowering of sedation"; will f/u with neuro recommendations. Repeat vEEG 3/24 showing severe cerebral dysfunction   -C/w levetiracetam 500 mg IV BID & Valium as above.   -Completely off propofol      CV:  #Shock - hemorrhagic shock s/p kidney biopsy s/p 5u pRBC, 3 plasma, 3 plt---RESOLVED  last dose of lovenox AC on 3/18 at 5PM   - Monitor cbc q24  - Hg goal >6 per heme/onc  - GI consulted given downtrending Hgb, melanotic stool, and positive stool occult blood feces. S/p EGD with cauterization of gastric ulcer & hemostatic spraying of three duodenal ulcers.       #Hx of VTE (R IJ thrombus, L brachial DVT)  - CTM, hold AC given hemorrhage  - Bullae present on arm with DVT  - Appreciate orthopedics hand surgery consult, not compartment syndrome     PULM:  #Acute hypoxic respiratory failure  #Pna- 2/2 ventilation requirement  #Tracheostomy   CXR with R lung field and left mid and lower lung field hazy opacities.  - continue with duonebs Q6H & s/p HTS  - 3/28 CXR showing increasing RUL consolidation   -4/4 bedside tracheostomy   -S/p Abx completion course as below and currently on meropenem 500 mg Q24H (04/02 - 04/07)  -C/w trach care (Size 7 portex percutaneous tracheostomy). Provide as much comfort as possible on vent settings (PC if PS not tolerated).    RENAL:  #Acute renal failure.   #Renal mass s/p biopsy -   #Acidosis  Oliguric & receiving Bumex IVP PRN per nephro recs  Acute renal failure s/p cardiac arrest most likely 2/2 ATN requiring intermittent HD with nephro following  - TOV 3/28, failed; replaced daniel on 4/1; Attempt TOV when possible pending GOCs  - Trend Cr, UOP, lytes  - S/p bicarb drip completion & sodium bicarbonate 650 mg po TIDcompletion  - Per pathology result of renal biopsy, noninvasive urothelial malignancy   - HD sessions per nephro. XANDER espinoza removed.    #Hypernatremia  Still Hypernatremic s/p 100 cc/hr D5 + 1/2 NS for 6 hrs. Will start D5% @ 50 cc/hr for 10 hrs for now. Repeat BMP afterwards and monitor sodium.    GI:  #Shock liver  Unchanged  LFTs. Jaundice with hyperbilirubinemia (direct).   - Trend liver enzymes  - RUQ US showing enlarged periportal and periaortic enlarged lymph nodes and enlarged left lateral liver lobe and CBD 9 mm.   - LFTs stable for now    #Occult blood in feces  Positive Occult blood with dropping Hgb daily below goal of 6 ( Hx of SS). Patient with melena during hospital stay requiring pRBC to meet goal and with inappropriate Hgb response.  S/p 10 U of pRBCs since the beginning of April given downtrending Hgb  Maintain active T & S  C/w PPI 40 mg IV BID for now.  GI consulted given downtrending Hgb, melanotic stool, and positive stool occult blood feces. S/p EGD with cauterization of gastric ulcer & hemostatic spraying of three duodenal ulcers.     #Diet:  - NPO & no NGT (Until 48 hrs after EGD). Hold all PO meds and seizure medications converted to IV.   - GI unable to place PEG due to hepatomegaly.   - Per IR consult: Given no safe window for endoscopic placement, recommend surgical consultation.   -IVFs while NPO  -Surgery reconsulted & will attempt Open G tube after discussion with brother      HEMATOLOGIC/ONCOLGOGIC   #Sickle cell crisis--Resolved  #Acute blood loss anemia- 2/2 sickle cells and uremia vs critical illness vs GIB  Downtrending direct hyperbilirubinemia,  gradually & Increasing reticulocyte count.   - Heme following & recs appreciated  - D/c deferasirox due to current renal failure   - S/p 10 U of pRBC since beginning of April  - Occult blood positive. C/w PPI 40 mg IV BID for now.  - Transfuse as needed Hb >6 goal per Heme.  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   -Pending GOC discussions  -CTM    #DVT prophylaxis - SCD  -Held Heparin SQ due to recent melena    Endo:  #JUAN    ID:  #Pneumoniae-  likely 2/2 ventilator related  Afebrile with downtrending leukocytosis. CXR showing increased RUL opacification.   - D/c  aztreonam 2000 mg IV qd ( started 3/22- 3/27)   - S/p vancomycin 1500 mg IV qd (4/2); 2nd dose 4/4, 3rd dose 4/5  - 4/2- CXR showing increasing RUL consolidation  - Completed 5d meropenem 500 mg IV qd (started 3/27-3/31); (4/2-4/7).   - CTM resp status    #Temperature  -If patient has fever (>101 F) would reculture (blood + urine) and remove daniel.    MSK:  #infiltration  Infiltration of sodium bicarb into left arm, now with arm distension and bullae. Elevated uric acid with nodule suggestive of podagra however no signs of active gout flare.   -Appreciate orthopedic hand surgery, not compartment syndrome   - CTM    Lines:   R. Midline & R. Accucath (03/25)    Ethics: Full Code   Pending: Discussion with family (Wednesday (04/16) @ 11 AM)  Palliative Team: Consulted   · Assessment	  Assessment and Plan: 	  Patient is 44y Male with PMHx of sickle cell disease admitted for anemia concerning for sickle cell crisis s/p ureteroscopy w/ L kidney biopsy 3/19, post-operative course c/b acute hemorrhagic shock, PEA arrest with ROSC; currently with acute renal failure on HD and global anoxic brain injury and urothelial cancer; s/p tracheostomy and pending GOC discussions to decide on possible interventions and dispo. GI consulted for melena patient s/p EGD with cauterization of gastric ulcer & hemostatic spraying of three duodenal ulcers. Patient full code and pending open G tube placement.     NEURO:  #Global anoxic brain injury   AAOx0.  Guarded prognosis.   - Since no NGT for now, c/w IVP Valium 10 mg Q4H standing and 10 mg Q4H PRN for myoclonus (switch to Valium 40 mg PO TID when possible).   - Repeat CT B 3/24 showing diffuse sulcal effacement with increased intracranial pressure, and few patchy foci of cortical blurring   - 3/25 MRIB with diffuse global abnormal supratentorial cortical restricted diffusion consistent with global hypoxic injury     #Seizures  No further myoclonus of upper body off propofol  - Witness myoclonic jerking concerning for seizures iso anoxic brain injury  - s/p 2.5g keppra load & midazolam 2mg IV q8 PRN   - vEEG report: "Abundant myoclonic seizures in the setting of a severe diffuse/multifocal cerebral dysfunction which can be seen in the setting of sedative effect or due to an anoxic injury, with improvement after 20:50 suggesting a lowering of sedation"; will f/u with neuro recommendations. Repeat vEEG 3/24 showing severe cerebral dysfunction   -C/w levetiracetam 500 mg IV BID & Valium as above.   -Completely off propofol      CV:  #Shock - hemorrhagic shock s/p kidney biopsy s/p 5u pRBC, 3 plasma, 3 plt---RESOLVED  last dose of lovenox AC on 3/18 at 5PM   - Monitor cbc q24  - Hg goal >6 per heme/onc  - GI consulted given downtrending Hgb, melanotic stool, and positive stool occult blood feces. S/p EGD with cauterization of gastric ulcer & hemostatic spraying of three duodenal ulcers.       #Hx of VTE (R IJ thrombus, L brachial DVT)  - CTM, hold AC given hemorrhage  - Bullae present on arm with DVT  - Appreciate orthopedics hand surgery consult, not compartment syndrome     PULM:  #Acute hypoxic respiratory failure  #Pna- 2/2 ventilation requirement  #Tracheostomy   CXR with R lung field and left mid and lower lung field hazy opacities.  - continue with duonebs Q6H & s/p HTS  - 3/28 CXR showing increasing RUL consolidation   -4/4 bedside tracheostomy   -S/p Abx completion course as below and currently on meropenem 500 mg Q24H (04/02 - 04/07)  -C/w trach care (Size 7 portex percutaneous tracheostomy). Provide as much comfort as possible on vent settings (PC if PS not tolerated).    RENAL:  #Acute renal failure.   #Renal mass s/p biopsy -   #Acidosis  Oliguric & receiving Bumex IVP PRN per nephro recs  Acute renal failure s/p cardiac arrest most likely 2/2 ATN requiring intermittent HD with nephro following  - TOV 3/28, failed; replaced daniel on 4/1; Attempt TOV when possible pending GOCs  - Trend Cr, UOP, lytes  - S/p bicarb drip completion & sodium bicarbonate 650 mg po TIDcompletion  - Per pathology result of renal biopsy, noninvasive urothelial malignancy   - HD sessions per nephro. XANDER espinoza removed.    #Hypernatremia  Still Hypernatremic s/p 100 cc/hr D5 + 1/2 NS for 6 hrs. Will start D5% @ 50 cc/hr for 10 hrs for now. Repeat BMP afterwards and monitor sodium.    GI:  #Shock liver  Unchanged  LFTs. Jaundice with hyperbilirubinemia (direct).   - Trend liver enzymes  - RUQ US showing enlarged periportal and periaortic enlarged lymph nodes and enlarged left lateral liver lobe and CBD 9 mm.   - LFTs stable for now    #Occult blood in feces  Positive Occult blood with dropping Hgb daily below goal of 6 ( Hx of SS). Patient with melena during hospital stay requiring pRBC to meet goal and with inappropriate Hgb response.  S/p 10 U of pRBCs since the beginning of April given downtrending Hgb  Maintain active T & S  C/w PPI 40 mg IV BID for now.  GI consulted given downtrending Hgb, melanotic stool, and positive stool occult blood feces. S/p EGD with cauterization of gastric ulcer & hemostatic spraying of three duodenal ulcers.     #Diet:  - NPO & no NGT (Until 48 hrs after EGD). Hold all PO meds and seizure medications converted to IV.   - GI unable to place PEG due to hepatomegaly.   - Per IR consult: Given no safe window for endoscopic placement, recommend surgical consultation.   -IVFs while NPO  -Surgery reconsulted & will attempt Open G tube after discussion with brother      HEMATOLOGIC/ONCOLGOGIC   #Sickle cell crisis--Resolved  #Acute blood loss anemia- 2/2 sickle cells and uremia vs critical illness vs GIB  Downtrending direct hyperbilirubinemia,  gradually & Increasing reticulocyte count.   - Heme following & recs appreciated  - D/c deferasirox due to current renal failure   - S/p 10 U of pRBC since beginning of April  - Occult blood positive. C/w PPI 40 mg IV BID for now.  - Transfuse as needed Hb >6 goal per Heme.  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   -Pending GOC discussions  -CTM    #DVT prophylaxis - SCD  -Held Heparin SQ due to recent melena    Endo:  #JUAN    ID:  #Pneumoniae-  likely 2/2 ventilator related  Afebrile with downtrending leukocytosis. CXR showing increased RUL opacification.   - D/c  aztreonam 2000 mg IV qd ( started 3/22- 3/27)   - S/p vancomycin 1500 mg IV qd (4/2); 2nd dose 4/4, 3rd dose 4/5  - 4/2- CXR showing increasing RUL consolidation  - Completed 5d meropenem 500 mg IV qd (started 3/27-3/31); (4/2-4/7).   - CTM resp status    #Temperature  -If patient has fever (>101 F) would reculture (blood + urine) and remove daniel.    MSK:  #infiltration  Infiltration of sodium bicarb into left arm, now with arm distension and bullae. Elevated uric acid with nodule suggestive of podagra however no signs of active gout flare.   -Appreciate orthopedic hand surgery, not compartment syndrome   - CTM    Lines:   Only peripheral lines    Ethics: Full Code   Pending: Discussion with family (Wednesday (04/16) @ 11 AM)  Palliative Team: Consulted   · Assessment	  Assessment and Plan: 	  Patient is 44y Male with PMHx of sickle cell disease admitted for anemia concerning for sickle cell crisis s/p ureteroscopy w/ L kidney biopsy 3/19, post-operative course c/b acute hemorrhagic shock, PEA arrest with ROSC; currently with acute renal failure on HD and global anoxic brain injury and urothelial cancer; s/p tracheostomy and pending GOC discussions to decide on possible interventions and dispo. GI consulted for melena patient s/p EGD with cauterization of gastric ulcer & hemostatic spraying of three duodenal ulcers. Patient full code and pending open G tube placement.     NEURO:  #Global anoxic brain injury   AAOx0.  Guarded prognosis.   - Since no NGT for now, c/w IVP Valium 10 mg Q4H standing and 10 mg Q4H PRN for myoclonus (switch to Valium 40 mg PO TID when possible).   - Repeat CT B 3/24 showing diffuse sulcal effacement with increased intracranial pressure, and few patchy foci of cortical blurring   - 3/25 MRIB with diffuse global abnormal supratentorial cortical restricted diffusion consistent with global hypoxic injury     #Seizures  No further myoclonus of upper body off propofol  - Witness myoclonic jerking concerning for seizures iso anoxic brain injury  - s/p 2.5g keppra load & midazolam 2mg IV q8 PRN   - vEEG report: "Abundant myoclonic seizures in the setting of a severe diffuse/multifocal cerebral dysfunction which can be seen in the setting of sedative effect or due to an anoxic injury, with improvement after 20:50 suggesting a lowering of sedation"; will f/u with neuro recommendations. Repeat vEEG 3/24 showing severe cerebral dysfunction   -C/w levetiracetam 500 mg IV BID & Valium as above.   -Completely off propofol      CV:  #Shock - hemorrhagic shock s/p kidney biopsy s/p 5u pRBC, 3 plasma, 3 plt---RESOLVED  last dose of lovenox AC on 3/18 at 5PM   - Monitor cbc q24  - Hg goal >6 per heme/onc  - GI consulted given downtrending Hgb, melanotic stool, and positive stool occult blood feces. S/p EGD with cauterization of gastric ulcer & hemostatic spraying of three duodenal ulcers.     #Hx of VTE (R IJ thrombus, L brachial DVT)  - Heparin gtt  - Appreciate orthopedics hand surgery consult, not compartment syndrome. Signed off    PULM:  #Acute hypoxic respiratory failure  #Pna- 2/2 ventilation requirement  #Tracheostomy   CXR with R lung field and left mid and lower lung field hazy opacities.  - continue with duonebs Q6H & s/p HTS  - 3/28 CXR showing increasing RUL consolidation   -4/4 bedside tracheostomy   -S/p Abx completion course as below and currently on meropenem 500 mg Q24H (04/02 - 04/07)  -C/w trach care (Size 7 portex percutaneous tracheostomy). Provide as much comfort as possible on vent settings (PC if PS not tolerated).    RENAL:  #Acute renal failure.   #Renal mass s/p biopsy -   #Acidosis  Oliguric & receiving Bumex IVP PRN per nephro recs  Acute renal failure s/p cardiac arrest most likely 2/2 ATN requiring intermittent HD with nephro following  - TOV 3/28, failed; replaced daniel on 4/1; Attempt TOV when possible pending GOCs  - Trend Cr, UOP, lytes  - S/p bicarb drip completion & sodium bicarbonate 650 mg po TIDcompletion  - Per pathology result of renal biopsy, noninvasive urothelial malignancy   - HD sessions per nephro. L. IJ shiley removed and no HD needs for now    #Hypernatremia  Still Hypernatremic s/p 100 cc/hr D5 + 1/2 NS for 6 hrs. Will start D5% @ 75 cc/hr for 20 hrs for now. Repeat BMP afterwards and monitor sodium.    GI:  #Shock liver  Unchanged  LFTs. Jaundice with hyperbilirubinemia (direct).   - Trend liver enzymes  - RUQ US showing enlarged periportal and periaortic enlarged lymph nodes and enlarged left lateral liver lobe and CBD 9 mm.   - LFTs stable for now    #Occult blood in feces  Positive Occult blood with dropping Hgb daily below goal of 6 ( Hx of SS). Patient with melena during hospital stay requiring pRBC to meet goal and with inappropriate Hgb response.  S/p 10 U of pRBCs since the beginning of April given downtrending Hgb  Maintain active T & S  C/w PPI 40 mg IV BID for now.  GI consulted given downtrending Hgb, melanotic stool, and positive stool occult blood feces. S/p EGD with cauterization of gastric ulcer & hemostatic spraying of three duodenal ulcers.     #Diet:  - NPO & no NGT (Until 48 hrs after EGD). Hold all PO meds and seizure medications converted to IV.   - GI unable to place PEG due to hepatomegaly.   - Per IR consult: Given no safe window for endoscopic placement, recommend surgical consultation.   -IVFs while NPO  -Surgery reconsulted & will attempt Open G tube after discussion with brother      HEMATOLOGIC/ONCOLGOGIC   #Sickle cell crisis--Resolved  #Acute blood loss anemia- 2/2 sickle cells and uremia vs critical illness vs GIB  Downtrending direct hyperbilirubinemia,  gradually & Increasing reticulocyte count.   - Heme following & recs appreciated  - D/c deferasirox due to current renal failure   - S/p 10 U of pRBC since beginning of April  - Occult blood positive. C/w PPI 40 mg IV BID for now.  - Transfuse as needed Hb >6 goal per Heme.  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   -Pending C discussions  -CTM    #DVT prophylaxis   -Heparin gtt    Endo:  #JUAN    ID:  #Pneumoniae-  likely 2/2 ventilator related  Afebrile with downtrending leukocytosis. CXR showing increased RUL opacification.   - D/c  aztreonam 2000 mg IV qd ( started 3/22- 3/27)   - S/p vancomycin 1500 mg IV qd (4/2); 2nd dose 4/4, 3rd dose 4/5  - 4/2- CXR showing increasing RUL consolidation  - Completed 5d meropenem 500 mg IV qd (started 3/27-3/31); (4/2-4/7).   - CTM resp status    #Temperature  -If patient has fever (>101 F) would reculture (blood + urine) and remove daniel.    MSK:  #infiltration  Infiltration of sodium bicarb into left arm, now with arm distension and bullae. Elevated uric acid with nodule suggestive of podagra however no signs of active gout flare.   -Appreciate orthopedic hand surgery, not compartment syndrome. Signed off  - CTM    Lines:   Only peripheral lines    Ethics: Full Code   Pending: Discussion with family (Wednesday (04/16) @ 11 AM)  Palliative Team: Consulted   · Assessment	  Assessment and Plan: 	  Patient is 44y Male with PMHx of sickle cell disease admitted for anemia concerning for sickle cell crisis s/p ureteroscopy w/ L kidney biopsy 3/19, post-operative course c/b acute hemorrhagic shock, PEA arrest with ROSC; currently with acute renal failure on HD and global anoxic brain injury and urothelial cancer; s/p tracheostomy and pending GOC discussions to decide on possible interventions and dispo. GI consulted for melena patient s/p EGD with cauterization of gastric ulcer & hemostatic spraying of three duodenal ulcers. Patient full code and pending open G tube placement.     NEURO:  #Global anoxic brain injury   AAOx0.  Guarded prognosis.   - Since no NGT for now, c/w IVP Valium 10 mg Q4H standing and 10 mg Q4H PRN for myoclonus (switch to Valium 40 mg PO TID when possible).   - Repeat CT B 3/24 showing diffuse sulcal effacement with increased intracranial pressure, and few patchy foci of cortical blurring   - 3/25 MRIB with diffuse global abnormal supratentorial cortical restricted diffusion consistent with global hypoxic injury     #Seizures  No further myoclonus of upper body off propofol  - Witness myoclonic jerking concerning for seizures iso anoxic brain injury  - s/p 2.5g keppra load & midazolam 2mg IV q8 PRN   - vEEG report: "Abundant myoclonic seizures in the setting of a severe diffuse/multifocal cerebral dysfunction which can be seen in the setting of sedative effect or due to an anoxic injury, with improvement after 20:50 suggesting a lowering of sedation"; will f/u with neuro recommendations. Repeat vEEG 3/24 showing severe cerebral dysfunction   -C/w levetiracetam 500 mg IV BID & Valium as above.   -Completely off propofol      CV:  #Shock - hemorrhagic shock s/p kidney biopsy s/p 5u pRBC, 3 plasma, 3 plt---RESOLVED  last dose of lovenox AC on 3/18 at 5PM   - Monitor cbc q24  - Hg goal >6 per heme/onc  - GI consulted given downtrending Hgb, melanotic stool, and positive stool occult blood feces. S/p EGD with cauterization of gastric ulcer & hemostatic spraying of three duodenal ulcers.     #Hx of VTE (R IJ thrombus, L brachial DVT)  - Heparin gtt  - Appreciate orthopedics hand surgery consult, not compartment syndrome. Signed off    PULM:  #Acute hypoxic respiratory failure  #Pna- 2/2 ventilation requirement  #Tracheostomy   CXR with R lung field and left mid and lower lung field hazy opacities.  - continue with duonebs Q6H & s/p HTS  - 3/28 CXR showing increasing RUL consolidation   -4/4 bedside tracheostomy   -S/p Abx completion course as below and currently on meropenem 500 mg Q24H (04/02 - 04/07)  -C/w trach care (Size 7 portex percutaneous tracheostomy). Provide as much comfort as possible on vent settings (PC if PS not tolerated).    RENAL:  #Acute renal failure.   #Renal mass s/p biopsy -   #Acidosis  Oliguric & receiving Bumex IVP PRN per nephro recs  Acute renal failure s/p cardiac arrest most likely 2/2 ATN requiring intermittent HD with nephro following  - TOV 3/28, failed; replaced daniel on 4/1; Attempt TOV when possible pending GOCs  - Trend Cr, UOP, lytes  - S/p bicarb drip completion & sodium bicarbonate 650 mg po TIDcompletion  - Per pathology result of renal biopsy, noninvasive urothelial malignancy   - HD sessions per nephro. L. IJ shiley removed and no HD needs for now    #Hypernatremia  Still Hypernatremic s/p 100 cc/hr D5 + 1/2 NS for 6 hrs. Will start D5% @ 75 cc/hr for 20 hrs for now. Repeat BMP afterwards and monitor sodium.    GI:  #Shock liver  Unchanged  LFTs. Jaundice with hyperbilirubinemia (direct).   - Trend liver enzymes  - RUQ US showing enlarged periportal and periaortic enlarged lymph nodes and enlarged left lateral liver lobe and CBD 9 mm.   - LFTs stable for now    #Occult blood in feces  Positive Occult blood with dropping Hgb daily below goal of 6 ( Hx of SS). Patient with melena during hospital stay requiring pRBC to meet goal and with inappropriate Hgb response.  S/p 10 U of pRBCs since the beginning of April given downtrending Hgb  Maintain active T & S  C/w PPI 40 mg IV BID for now.  GI consulted given downtrending Hgb, melanotic stool, and positive stool occult blood feces. S/p EGD with cauterization of gastric ulcer & hemostatic spraying of three duodenal ulcers.     #Diet:  - NPO & no NGT (Until 48 hrs after EGD). Hold all PO meds and seizure medications converted to IV.   - GI unable to place PEG due to hepatomegaly.   - Per IR consult: Given no safe window for endoscopic placement, recommend surgical consultation.   -IVFs while NPO  -Surgery reconsulted & will attempt Open G tube after discussion with brother      HEMATOLOGIC/ONCOLGOGIC   #Sickle cell crisis--Resolved  #Acute blood loss anemia- 2/2 sickle cells and uremia vs critical illness vs GIB  Downtrending direct hyperbilirubinemia,  gradually & Increasing reticulocyte count.   - Heme following & recs appreciated  - D/c deferasirox due to current renal failure   - S/p 10 U of pRBC since beginning of April  - Occult blood positive. C/w PPI 40 mg IV BID for now.  - Transfuse as needed Hb >6 goal per Heme.  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   -Pending C discussions  -CTM    #DVT prophylaxis   -Heparin gtt    Endo:  #JUAN    ID:  #Pneumoniae-  likely 2/2 ventilator related  Afebrile with downtrending leukocytosis. CXR showing increased RUL opacification.   - D/c  aztreonam 2000 mg IV qd ( started 3/22- 3/27)   - S/p vancomycin 1500 mg IV qd (4/2); 2nd dose 4/4, 3rd dose 4/5  - 4/2- CXR showing increasing RUL consolidation  - Completed 5d meropenem 500 mg IV qd (started 3/27-3/31); (4/2-4/7).   - CTM resp status    #Temperature  #Uptrending leukocytosis  -If patient has fever (>101 F) would reculture (blood + urine) and remove daniel.  -Repeat CXR (04/17/25)    MSK:  #infiltration  Infiltration of sodium bicarb into left arm, now with arm distension and bullae. Elevated uric acid with nodule suggestive of podagra however no signs of active gout flare.   -Appreciate orthopedic hand surgery, not compartment syndrome. Signed off  - CTM    Lines:   Only peripheral lines    Ethics: Full Code   Pending: Discussion with family (Wednesday (04/16) @ 11 AM)  Palliative Team: Consulted

## 2025-04-17 NOTE — PROGRESS NOTE ADULT - SUBJECTIVE AND OBJECTIVE BOX
SUBJECTIVE:  Patient seen and examined at bedside. Patient seen and examined at bedside.  No pressor requirement.  Tracheostomy and full vent support.  Hep gtt running for RUE DVT.  Patient received 1uPRBC for H/H of 5.8/17 responded to 6.7/20.  ROS unable to be achieved due to patient overall clinical condition.        OBJECTIVE:  Vital Signs Last 24 Hrs  T(C): 37.5 (17 Apr 2025 12:00), Max: 38.1 (16 Apr 2025 16:00)  T(F): 99.5 (17 Apr 2025 12:00), Max: 100.6 (16 Apr 2025 16:00)  HR: 86 (17 Apr 2025 12:00) (86 - 119)  BP: 152/98 (17 Apr 2025 12:00) (122/68 - 161/94)  BP(mean): 113 (17 Apr 2025 12:00) (82 - 113)  RR: 19 (17 Apr 2025 12:00) (19 - 25)  SpO2: 99% (17 Apr 2025 12:00) (98% - 100%)    Parameters below as of 17 Apr 2025 12:00  Patient On (Oxygen Delivery Method): ventilator    O2 Concentration (%): 30        Physical Examination:  GEN: NAD  NEURO: AAOx0  PULM: trach on full vent support  ABD: soft, hepatomegaly, costal margin 9cm below diaphragm, well healed laparoscopic surgical scars

## 2025-04-17 NOTE — CHART NOTE - NSCHARTNOTEFT_GEN_A_CORE
: Antonio Brandon  INDICATION: AHRF    PROCEDURE:  [X] LIMITED ECHO  [X] LIMITED CHEST    FINDINGS/INTERPRETATION:  Lungs:  B line predominant Right  Small bilateral pleural effusion  Small consolidation on RUL and RLL    Heart:  Normal LV Systolic function  IVC 1.19cm    Images in [facility] : Antonio Brandon  INDICATION: AHRF    PROCEDURE:  [X] LIMITED ECHO  [X] LIMITED CHEST    FINDINGS/INTERPRETATION:  Lungs:  B line predominant Right  Small bilateral pleural effusion  Small consolidation on RUL and RLL    Heart:  LVOT VTI 21.4cm  TAPSE 18.1mm  EPSS 5.3mm  Normal LV Systolic function  IVC 1.19cm    Images in [facility] : Antonio Brandon  INDICATION: AHRF    PROCEDURE:  [X] LIMITED ECHO  [X] LIMITED CHEST    FINDINGS/INTERPRETATION:  Lungs:  B line predominant Right  Small bilateral pleural effusion  Small consolidation on RUL and RLL    Heart:  LVOT VTI 21.4cm  TAPSE 18.1mm  EPSS 5.3mm  Normal LV Systolic function  IVC 1.19cm    Images in Q[facility]    Attending Addendum:     I was present during the entire procedure and agree with the above findings and interpretation. TIme spent on the procedure was separate from the critical care time spent caring for the patient.       Brandon Koch MD  Interventional Pulmonology & Critical Care Medicine  [facility]

## 2025-04-17 NOTE — PROGRESS NOTE ADULT - ATTENDING COMMENTS
Patient seen and examined with ICU team at bedside during rounds after lab data, medical records and radiology reports reviewed. I have read and agreeable in general with the documented note, assessment, and management plan which reflects my opinions from bedside rounds.      TOLU VARGAS is a 44y Male with PMH of  Sickle cell disease c/b hx of ACS, DVT, presented with low Hg (5/3) s/p bladder biopsy complicated by hemorrhagic shock, course further complicated by cardiac arrest with multiorgan failure and anoxic brain injury      - family meeting yesterday plan for PEG tube with surgery   - L arm extravasation during RBC transfusion.       #GIB  #Gastric ulcer s/p scope on 4/11  - PEG with surgery pending. consult placed.   # Anoxic encephalopathy 2/2 cardiac arrest   #myoclonus  - continue  Keppra  - continue valium   #AHRF s/p trach  - continue PS, PC if not tolerated  - continue IPV and trach care  #Sickle cell  #AoC anemia   - c/tm goal  > 6  #Right UE DVT   - hold ac d/t GIB  # ATN  - no acute indication for iHD, catheter removed.   # Fluid Goal - even  # BM - daily  # POCUS -  see note   # DVT ppx - holding for GIB , SCDs  # GOC - plan for family meeting Wednesday       This patient is critically ill and required frequent bedside visits with therapy change. I have personally provided 38 minutes of critical care time concurrently with the resident/fellow/NP/PA. This time excludes time spent on separate procedures and time spent teaching. I have reviewed the resident/fellow/NP/PA’s documentation and I agree with the assessment and plan of care.    Brandon Koch MD  Interventional Pulmonology & Critical Care Medicine.

## 2025-04-17 NOTE — PROGRESS NOTE ADULT - SUBJECTIVE AND OBJECTIVE BOX
[facility] Division of Kidney Diseases & Hypertension  FOLLOW UP NOTE  [phone]--------------------------------------------------------------------------------  Chief Complaint:MATA    24 hour events/subjective: Patient seen and examined in the ICU this morning. Patient remains on mechanical ventilator. Unable to obtain ROS due to clinical status.     PAST HISTORY  --------------------------------------------------------------------------------  No significant changes to PMH, PSH, FHx, SHx, unless otherwise noted    ALLERGIES & MEDICATIONS  --------------------------------------------------------------------------------  Allergies    penicillin (Pruritus)  hydroxyurea (Other)  piperacillin-tazobactam (Urticaria)  ceftriaxone (Anaphylaxis)    Intolerances    Standing Inpatient Medications  albuterol/ipratropium for Nebulization 3 milliLiter(s) Nebulizer every 6 hours  chlorhexidine 2% Cloths 1 Application(s) Topical daily  dextrose 5%. 1000 milliLiter(s) IV Continuous <Continuous>  dextrose 50% Injectable 25 Gram(s) IV Push once  diazepam  Injectable 10 milliGRAM(s) IV Push every 4 hours  influenza   Vaccine 0.5 milliLiter(s) IntraMuscular once  levETIRAcetam   Injectable 500 milliGRAM(s) IV Push every 12 hours  pantoprazole  Injectable 40 milliGRAM(s) IV Push every 12 hours  petrolatum Ophthalmic Ointment 1 Application(s) Both EYES two times a day    PRN Inpatient Medications  sodium chloride 0.9% lock flush 10 milliLiter(s) IV Push every 1 hour PRN    REVIEW OF SYSTEMS  --------------------------------------------------------------------------------  Unable to obtain ROS due to clinical status.     VITALS/PHYSICAL EXAM  --------------------------------------------------------------------------------  T(C): 37.4 (04-17-25 @ 08:00), Max: 38.1 (04-16-25 @ 16:00)  HR: 100 (04-17-25 @ 09:00) (94 - 119)  BP: 149/84 (04-17-25 @ 09:00) (122/68 - 161/94)  RR: 20 (04-17-25 @ 09:00) (16 - 25)  SpO2: 99% (04-17-25 @ 09:00) (98% - 100%)  Wt(kg): --    04-16-25 @ 07:01  -  04-17-25 @ 07:00  --------------------------------------------------------  IN: 2220 mL / OUT: 2750 mL / NET: -530 mL    04-17-25 @ 07:01  -  04-17-25 @ 10:04  --------------------------------------------------------  IN: 0 mL / OUT: 200 mL / NET: -200 mL    PHYSICAL EXAM:  Gen: ill appearing  HEENT: tracheostomy, on mechanical ventilator  Pulm: rhonchi b/l   CV: RRR, S1S2+  Abd: +BS, soft  : Dave noted   Extremities: +LE edema  Neuro: Unresponsive to verbal stimuli  Skin: Warm  Vascular Access: None (Left IJ non-tunneled HD catheter -removed)    LABS/STUDIES  --------------------------------------------------------------------------------              5.8    21.74 >-----------<  254      [04-17-25 @ 00:40]              17.0     148  |  116  |  55  ----------------------------<  107      [04-17-25 @ 00:40]  3.7   |  18  |  2.55        Ca     8.3     [04-17-25 @ 00:40]      iCa    1.12     [04-16 @ 00:21]      Mg     1.60     [04-17-25 @ 00:40]      Phos  5.0     [04-17-25 @ 00:40]    TPro  6.5  /  Alb  2.4  /  TBili  6.3  /  DBili  x   /  AST  97  /  ALT  36  /  AlkPhos  128  [04-17-25 @ 00:40]    PT/INR: PT 20.7 , INR 1.75       [04-17-25 @ 00:40]  PTT: 28.8       [04-17-25 @ 00:40]    Creatinine Trend:  SCr 2.55 [04-17 @ 00:40]  SCr 2.57 [04-16 @ 22:20]  SCr 2.82 [04-16 @ 00:21]  SCr 3.08 [04-15 @ 00:14]  SCr 3.11 [04-14 @ 20:04]    Division of Kidney Diseases & Hypertension  FOLLOW UP NOTE  [phone]--------------------------------------------------------------------------------    Chief Complaint: MATA    24 hour events/subjective: Patient seen and examined in the MICU this morning. Patient remains on mechanical ventilator. Unable to obtain ROS due to clinical status.     PAST HISTORY  --------------------------------------------------------------------------------  No significant changes to PMH, PSH, FHx, SHx, unless otherwise noted    ALLERGIES & MEDICATIONS  --------------------------------------------------------------------------------  Allergies    penicillin (Pruritus)  hydroxyurea (Other)  piperacillin-tazobactam (Urticaria)  ceftriaxone (Anaphylaxis)    Intolerances    Standing Inpatient Medications  albuterol/ipratropium for Nebulization 3 milliLiter(s) Nebulizer every 6 hours  chlorhexidine 2% Cloths 1 Application(s) Topical daily  dextrose 5%. 1000 milliLiter(s) IV Continuous <Continuous>  dextrose 50% Injectable 25 Gram(s) IV Push once  diazepam  Injectable 10 milliGRAM(s) IV Push every 4 hours  influenza   Vaccine 0.5 milliLiter(s) IntraMuscular once  levETIRAcetam   Injectable 500 milliGRAM(s) IV Push every 12 hours  pantoprazole  Injectable 40 milliGRAM(s) IV Push every 12 hours  petrolatum Ophthalmic Ointment 1 Application(s) Both EYES two times a day    PRN Inpatient Medications  sodium chloride 0.9% lock flush 10 milliLiter(s) IV Push every 1 hour PRN    REVIEW OF SYSTEMS  --------------------------------------------------------------------------------  Unable to obtain ROS due to clinical status.     VITALS/PHYSICAL EXAM  --------------------------------------------------------------------------------  T(C): 37.4 (04-17-25 @ 08:00), Max: 38.1 (04-16-25 @ 16:00)  HR: 100 (04-17-25 @ 09:00) (94 - 119)  BP: 149/84 (04-17-25 @ 09:00) (122/68 - 161/94)  RR: 20 (04-17-25 @ 09:00) (16 - 25)  SpO2: 99% (04-17-25 @ 09:00) (98% - 100%)  Wt(kg): --    04-16-25 @ 07:01  -  04-17-25 @ 07:00  --------------------------------------------------------  IN: 2220 mL / OUT: 2750 mL / NET: -530 mL    04-17-25 @ 07:01  -  04-17-25 @ 10:04  --------------------------------------------------------  IN: 0 mL / OUT: 200 mL / NET: -200 mL    PHYSICAL EXAM:  Gen: ill appearing  HEENT: tracheostomy, on mechanical ventilator  Pulm: +rhonchi B/L   CV: RRR, S1S2+  Abd: +BS, soft  : Jolie noted   Extremities: +LE edema  Neuro: Unresponsive to verbal stimuli  Skin: Warm    LABS/STUDIES  --------------------------------------------------------------------------------              5.8    21.74 >-----------<  254      [04-17-25 @ 00:40]              17.0     148  |  116  |  55  ----------------------------<  107      [04-17-25 @ 00:40]  3.7   |  18  |  2.55        Ca     8.3     [04-17-25 @ 00:40]      iCa    1.12     [04-16 @ 00:21]      Mg     1.60     [04-17-25 @ 00:40]      Phos  5.0     [04-17-25 @ 00:40]    TPro  6.5  /  Alb  2.4  /  TBili  6.3  /  DBili  x   /  AST  97  /  ALT  36  /  AlkPhos  128  [04-17-25 @ 00:40]    Creatinine Trend:  SCr 2.55 [04-17 @ 00:40]  SCr 2.57 [04-16 @ 22:20]  SCr 2.82 [04-16 @ 00:21]  SCr 3.08 [04-15 @ 00:14]  SCr 3.11 [04-14 @ 20:04]

## 2025-04-17 NOTE — CHART NOTE - NSCHARTNOTEFT_GEN_A_CORE
MICU Downgrade Note  ---------------------------    Transfer from: MICU  Transfer to:  (  ) Medicine    (  ) Telemetry    (x) RCU    (  ) Palliative    (  ) Stroke Unit    (  ) _______________  Accepting Physician:    HPI/MICU COURSE/OVN EVENTS:  44M with PMHx of Sickle cell disease (last transfusion/exchange transfusion 12/2024), prior acute chest syndrome, iron overload, Gout, HTN and RUE DVT 12/2024 on eliquis (on hold since 3/15 for upcoming cystoscopy, ureteroscopy on 3/19) who was sent in by his Hematologist for transfusion/low Hg prior to procedure (Hg of 5.3). Patient c/o back/LE pain at time of visit secondary to being on sickle cell crisis. Denies SOB or chest pain. No recent fevers, chills or sick contacts. No cough. No nausea/vomiting or abdominal pain. No melena or BRBPR. No urinary complaints, denies hematuria. Good appetite. In ER /85, , RR 18, Temp 98.7F, O2 Sat 88% RA-placed on 4L NC. Received Tylenol IV, benadryl 50mg IV, dilaudid 2mg IV x3, 1U PRBC and started on PCA pump with heme following. Urology consulted for prior planned cystoscopy w/ L ureteroscopy and bx now s/p L ureteroscopy w/ biopsy and stent placement on 3/19. 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 On 3/20, patient had RRT for hypoglycemia that resolved with d50, however had another RRT for repeated hypoglycemia, however patient found to be pulseless, code blue called for PEA. ROSC achieved in approximately 4 minutes and patient intubated and transferred to MICU. Subsequent CT abdomen and pelvis on 3/20/25 showed left perirenal and subcapsular hemorrhage without foci of active extravasation. IR consulted for renal angio with possible embolization. Patient underwent Left renal angiogram and embolization with IR. Neurology consulted and CT & MRI Brain concerning for anoxic brain injury. Subsequently, patient found to have non-invasive urothelial cancer and acute renal failure and placed on HD (discontinued on 04/14/25) . Patient s/p tracheostomy & was pending PEG tube placement, which was denied by IR and GI due to no safe window for endoscopic placement. Surgery consulted and agreed only after GOC discussion, which was done on 04/16/25. Patient is currently full code, no limitations on medical/surgical management, and pending Open G tube placement by Surgery on 04/18/25.               REVIEW OF SYSTEMS:  CONSTITUTIONAL: No fever, chills  HEENT:  No blurry vision No sinus or throat pain  NECK: No pain or stiffness  RESPIRATORY: No cough, wheezing, chills or hemoptysis; No shortness of breath  CARDIOVASCULAR: No chest pain, palpitations  GASTROINTESTINAL: No abdominal pain. No nausea, vomiting, or diarrhea  GENITOURINARY: No dysuria  NEUROLOGICAL: No HA, No focal weakness  SKIN: No itching, burning, rashes, or lesions   MUSCULOSKELETAL: No joint pain or swelling; No muscle, back, or extremity pain    MEDICATIONS:  albuterol/ipratropium for Nebulization 3 milliLiter(s) Nebulizer every 6 hours  chlorhexidine 2% Cloths 1 Application(s) Topical daily  dextrose 5%. 1000 milliLiter(s) IV Continuous <Continuous>  dextrose 50% Injectable 25 Gram(s) IV Push once  diazepam  Injectable 10 milliGRAM(s) IV Push every 4 hours  heparin  Infusion.  Unit(s)/Hr IV Continuous <Continuous>  influenza   Vaccine 0.5 milliLiter(s) IntraMuscular once  levETIRAcetam   Injectable 500 milliGRAM(s) IV Push every 12 hours  pantoprazole  Injectable 40 milliGRAM(s) IV Push every 12 hours  petrolatum Ophthalmic Ointment 1 Application(s) Both EYES two times a day      T(C): 37.5 (04-17-25 @ 12:00), Max: 38.1 (04-16-25 @ 16:00)  HR: 89 (04-17-25 @ 15:00) (67 - 119)  BP: 154/90 (04-17-25 @ 15:00) (122/68 - 161/94)  RR: 20 (04-17-25 @ 15:00) (19 - 25)  SpO2: 100% (04-17-25 @ 15:00) (98% - 100%)  Wt(kg): --Vital Signs Last 24 Hrs  T(C): 37.5 (17 Apr 2025 12:00), Max: 38.1 (16 Apr 2025 16:00)  T(F): 99.5 (17 Apr 2025 12:00), Max: 100.6 (16 Apr 2025 16:00)  HR: 89 (17 Apr 2025 15:00) (67 - 119)  BP: 154/90 (17 Apr 2025 15:00) (122/68 - 161/94)  BP(mean): 108 (17 Apr 2025 15:00) (82 - 113)  RR: 20 (17 Apr 2025 15:00) (19 - 25)  SpO2: 100% (17 Apr 2025 15:00) (98% - 100%)    Parameters below as of 17 Apr 2025 15:00  Patient On (Oxygen Delivery Method): ventilator    O2 Concentration (%): 30    PHYSICAL EXAM:  GENERAL: NAD, well-groomed, well-developed  HEAD:  Atraumatic, Normocephalic  EYES: EOMI, PERRLA, conjunctiva and sclera clear  ENMT:  Moist mucous membranes  NECK: Supple, No JVD,  CHEST/LUNG: Clear to auscultation bilaterally; No rales, rhonchi, wheezing, or rubs  HEART: Regular rate and rhythm; No murmurs, rubs, or gallops  ABDOMEN: Soft, Nontender, Nondistended; Bowel sounds present  NEURO: Alert & Oriented X3  EXTREMITIES: No LE edema, no calf tenderness  LYMPH: No lymphadenopathy noted  SKIN: No rashes or lesions    Consultant(s) Notes Reviewed:  [x ] YES  [ ] NO  Care Discussed with Consultants/Other Providers [ x] YES  [ ] NO    LABS:                        6.7    20.69 )-----------( 277      ( 17 Apr 2025 09:39 )             20.1     04-17    148[H]  |  116[H]  |  55[H]  ----------------------------<  107[H]  3.7   |  18[L]  |  2.55[H]    Ca    8.3[L]      17 Apr 2025 00:40  Phos  5.0     04-17  Mg     1.60     04-17    TPro  6.5  /  Alb  2.4[L]  /  TBili  6.3[H]  /  DBili  x   /  AST  97[H]  /  ALT  36  /  AlkPhos  128[H]  04-17    PT/INR - ( 17 Apr 2025 00:40 )   PT: 20.7 sec;   INR: 1.75 ratio         PTT - ( 17 Apr 2025 00:40 )  PTT:28.8 sec  Urinalysis Basic - ( 17 Apr 2025 00:40 )    Color: x / Appearance: x / SG: x / pH: x  Gluc: 107 mg/dL / Ketone: x  / Bili: x / Urobili: x   Blood: x / Protein: x / Nitrite: x   Leuk Esterase: x / RBC: x / WBC x   Sq Epi: x / Non Sq Epi: x / Bacteria: x      CAPILLARY BLOOD GLUCOSE      POCT Blood Glucose.: 118 mg/dL (17 Apr 2025 00:26)  POCT Blood Glucose.: 125 mg/dL (16 Apr 2025 17:38)        Urinalysis Basic - ( 17 Apr 2025 00:40 )    Color: x / Appearance: x / SG: x / pH: x  Gluc: 107 mg/dL / Ketone: x  / Bili: x / Urobili: x   Blood: x / Protein: x / Nitrite: x   Leuk Esterase: x / RBC: x / WBC x   Sq Epi: x / Non Sq Epi: x / Bacteria: x        RADIOLOGY & ADDITIONAL TESTS:    Imaging Personally Reviewed:  [x ] YES  [ ] NO    For Follow-Up:  [ ]   [ ] MICU Downgrade Note  ---------------------------    Transfer from: MICU  Transfer to:  (  ) Medicine    (  ) Telemetry    (x) RCU    (  ) Palliative    (  ) Stroke Unit    (  ) _______________  Accepting Physician:    HPI/MICU COURSE/OVN EVENTS:  44M with PMHx of Sickle cell disease (last transfusion/exchange transfusion 12/2024), prior acute chest syndrome, iron overload, Gout, HTN and RUE DVT 12/2024 on eliquis (on hold since 3/15 for upcoming cystoscopy, ureteroscopy on 3/19) who was sent in by his Hematologist for transfusion/low Hg prior to procedure (Hg of 5.3). Patient c/o back/LE pain at time of visit secondary to being on sickle cell crisis. Denies SOB or chest pain. No recent fevers, chills or sick contacts. No cough. No nausea/vomiting or abdominal pain. No melena or BRBPR. No urinary complaints, denies hematuria. Good appetite. In ER /85, , RR 18, Temp 98.7F, O2 Sat 88% RA-placed on 4L NC. Received Tylenol IV, benadryl 50mg IV, dilaudid 2mg IV x3, 1U PRBC and started on PCA pump with heme following. Urology consulted for prior planned cystoscopy w/ L ureteroscopy and bx now s/p L ureteroscopy w/ biopsy and stent placement on 3/19. 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 On 3/20, patient had RRT for hypoglycemia that resolved with d50, however had another RRT for repeated hypoglycemia, however patient found to be pulseless, code blue called for PEA. ROSC achieved in approximately 4 minutes and patient intubated and transferred to MICU. Subsequent CT abdomen and pelvis on 3/20/25 showed left perirenal and subcapsular hemorrhage without foci of active extravasation. IR consulted for renal angio with possible embolization. Patient underwent Left renal angiogram and embolization with IR. Neurology consulted and CT & MRI Brain concerning for anoxic brain injury. Subsequently, patient found to have non-invasive urothelial cancer and acute renal failure and placed on HD (discontinued on 04/14/25) . Patient s/p tracheostomy & was pending PEG tube placement, which was denied by IR and GI due to no safe window for endoscopic placement. Surgery consulted and agreed only after GOC discussion, which was done on 04/16/25. Patient is currently full code, no limitations on medical/surgical management, and pending Open G tube placement by Surgery on 04/18/25.     For Follow-Up:  [ ] Hold Heparin gtt @ [facility] (04/17-04/18) for open G tube placement  [ ] Transfuse Hgb >7 for procedure on 04/18  [ ] Further GOC discussions with family  [ ] Monitor Sodium levels (on D5W)  [ ] Transition to PO medications when possible    REVIEW OF SYSTEMS: Not elicited due to AAOx0    MEDICATIONS:  albuterol/ipratropium for Nebulization 3 milliLiter(s) Nebulizer every 6 hours  chlorhexidine 2% Cloths 1 Application(s) Topical daily  dextrose 5%. 1000 milliLiter(s) IV Continuous <Continuous>  dextrose 50% Injectable 25 Gram(s) IV Push once  diazepam  Injectable 10 milliGRAM(s) IV Push every 4 hours  heparin  Infusion.  Unit(s)/Hr IV Continuous <Continuous>  influenza   Vaccine 0.5 milliLiter(s) IntraMuscular once  levETIRAcetam   Injectable 500 milliGRAM(s) IV Push every 12 hours  pantoprazole  Injectable 40 milliGRAM(s) IV Push every 12 hours  petrolatum Ophthalmic Ointment 1 Application(s) Both EYES two times a day      T(C): 37.5 (04-17-25 @ 12:00), Max: 38.1 (04-16-25 @ 16:00)  HR: 89 (04-17-25 @ 15:00) (67 - 119)  BP: 154/90 (04-17-25 @ 15:00) (122/68 - 161/94)  RR: 20 (04-17-25 @ 15:00) (19 - 25)  SpO2: 100% (04-17-25 @ 15:00) (98% - 100%)  Wt(kg): --Vital Signs Last 24 Hrs  T(C): 37.5 (17 Apr 2025 12:00), Max: 38.1 (16 Apr 2025 16:00)  T(F): 99.5 (17 Apr 2025 12:00), Max: 100.6 (16 Apr 2025 16:00)  HR: 89 (17 Apr 2025 15:00) (67 - 119)  BP: 154/90 (17 Apr 2025 15:00) (122/68 - 161/94)  BP(mean): 108 (17 Apr 2025 15:00) (82 - 113)  RR: 20 (17 Apr 2025 15:00) (19 - 25)  SpO2: 100% (17 Apr 2025 15:00) (98% - 100%)    Parameters below as of 17 Apr 2025 15:00  Patient On (Oxygen Delivery Method): ventilator    O2 Concentration (%): 30    PHYSICAL EXAM:  General: NAD, normal habitus, mild jaundice. Yawning and biting down on tube. Pupils staring down  Neuro: AAOx0, sedated & non responsive to noxious stimuli, closed eyes, 5 mm pupils, sluggishly reactive pupils b/l. Pupillary reflex, corneal reflex, and gag reflex intact.  HEENT: icteric sclerae b/l, tracheostomy clean/dry/intact. Eyes more open today  Chest: non-tender to palpation  Heart: S1/S2, RRR   Lungs: Coarse breath sounds  Abd: soft, non-tender, but slightly distended.  Ext: Generalized trace pitting edema, LUE wrapped, nodule on R 1st digit proximal phalanges   Pulses: radial 2+ b/l, dorsalis pedis 2+ b/l   Lines/tubes/drains: NGT, Daniel, & Flexiseal  Psych: unable to assess     Consultant(s) Notes Reviewed:  [x ] YES  [ ] NO  Care Discussed with Consultants/Other Providers [ x] YES  [ ] NO    LABS:                        6.7    20.69 )-----------( 277      ( 17 Apr 2025 09:39 )             20.1     04-17    148[H]  |  116[H]  |  55[H]  ----------------------------<  107[H]  3.7   |  18[L]  |  2.55[H]    Ca    8.3[L]      17 Apr 2025 00:40  Phos  5.0     04-17  Mg     1.60     04-17    TPro  6.5  /  Alb  2.4[L]  /  TBili  6.3[H]  /  DBili  x   /  AST  97[H]  /  ALT  36  /  AlkPhos  128[H]  04-17    PT/INR - ( 17 Apr 2025 00:40 )   PT: 20.7 sec;   INR: 1.75 ratio         PTT - ( 17 Apr 2025 00:40 )  PTT:28.8 sec  Urinalysis Basic - ( 17 Apr 2025 00:40 )    Color: x / Appearance: x / SG: x / pH: x  Gluc: 107 mg/dL / Ketone: x  / Bili: x / Urobili: x   Blood: x / Protein: x / Nitrite: x   Leuk Esterase: x / RBC: x / WBC x   Sq Epi: x / Non Sq Epi: x / Bacteria: x      CAPILLARY BLOOD GLUCOSE      POCT Blood Glucose.: 118 mg/dL (17 Apr 2025 00:26)  POCT Blood Glucose.: 125 mg/dL (16 Apr 2025 17:38)        Urinalysis Basic - ( 17 Apr 2025 00:40 )    Color: x / Appearance: x / SG: x / pH: x  Gluc: 107 mg/dL / Ketone: x  / Bili: x / Urobili: x   Blood: x / Protein: x / Nitrite: x   Leuk Esterase: x / RBC: x / WBC x   Sq Epi: x / Non Sq Epi: x / Bacteria: x        RADIOLOGY & ADDITIONAL TESTS:    Imaging Personally Reviewed:  [x ] YES  [ ] NO      Assessment and Plan: 	  Patient is 44y Male with PMHx of sickle cell disease admitted for anemia concerning for sickle cell crisis s/p ureteroscopy w/ L kidney biopsy 3/19, post-operative course c/b acute hemorrhagic shock, PEA arrest with ROSC; currently with acute renal failure on HD and global anoxic brain injury and urothelial cancer; s/p tracheostomy and pending GOC discussions to decide on possible interventions and dispo. GI consulted for melena patient s/p EGD with cauterization of gastric ulcer & hemostatic spraying of three duodenal ulcers. Patient full code and pending open G tube placement.     NEURO:  #Global anoxic brain injury   AAOx0.  Guarded prognosis.   - Since no NGT for now, c/w IVP Valium 10 mg Q4H standing and 10 mg Q4H PRN for myoclonus (switch to Valium 40 mg PO TID when possible).   - Repeat CT B 3/24 showing diffuse sulcal effacement with increased intracranial pressure, and few patchy foci of cortical blurring   - 3/25 MRIB with diffuse global abnormal supratentorial cortical restricted diffusion consistent with global hypoxic injury     #Seizures  No further myoclonus of upper body off propofol  - Witness myoclonic jerking concerning for seizures iso anoxic brain injury  - s/p 2.5g keppra load & midazolam 2mg IV q8 PRN   - vEEG report: "Abundant myoclonic seizures in the setting of a severe diffuse/multifocal cerebral dysfunction which can be seen in the setting of sedative effect or due to an anoxic injury, with improvement after 20:50 suggesting a lowering of sedation"; will f/u with neuro recommendations. Repeat vEEG 3/24 showing severe cerebral dysfunction   -C/w levetiracetam 500 mg IV BID & Valium as above.   -Completely off propofol      CV:  #Shock - hemorrhagic shock s/p kidney biopsy s/p 5u pRBC, 3 plasma, 3 plt---RESOLVED  last dose of lovenox AC on 3/18 at 5PM   - Monitor cbc q24  - Hg goal >6 per heme/onc  - GI consulted given downtrending Hgb, melanotic stool, and positive stool occult blood feces. S/p EGD with cauterization of gastric ulcer & hemostatic spraying of three duodenal ulcers.     #Hx of VTE (R IJ thrombus, L brachial DVT)  - Heparin gtt  - Appreciate orthopedics hand surgery consult, not compartment syndrome. Signed off    PULM:  #Acute hypoxic respiratory failure  #Pna- 2/2 ventilation requirement  #Tracheostomy   CXR with R lung field and left mid and lower lung field hazy opacities.  - continue with duonebs Q6H & s/p HTS  - 3/28 CXR showing increasing RUL consolidation   -4/4 bedside tracheostomy   -S/p Abx completion course as below and currently on meropenem 500 mg Q24H (04/02 - 04/07)  -C/w trach care (Size 7 portex percutaneous tracheostomy). Provide as much comfort as possible on vent settings (PC if PS not tolerated).    RENAL:  #Acute renal failure.   #Renal mass s/p biopsy -   #Acidosis  Oliguric & receiving Bumex IVP PRN per nephro recs  Acute renal failure s/p cardiac arrest most likely 2/2 ATN requiring intermittent HD with nephro following  - TOV 3/28, failed; replaced daniel on 4/1; Attempt TOV when possible pending GOCs  - Trend Cr, UOP, lytes  - S/p bicarb drip completion & sodium bicarbonate 650 mg po TIDcompletion  - Per pathology result of renal biopsy, noninvasive urothelial malignancy   - HD sessions per nephro. L. IJ shiley removed and no HD needs for now    #Hypernatremia  Still Hypernatremic s/p 100 cc/hr D5 + 1/2 NS for 6 hrs. Will start D5% @ 75 cc/hr for 20 hrs for now. Repeat BMP afterwards and monitor sodium.    GI:  #Shock liver  Unchanged  LFTs. Jaundice with hyperbilirubinemia (direct).   - Trend liver enzymes  - RUQ US showing enlarged periportal and periaortic enlarged lymph nodes and enlarged left lateral liver lobe and CBD 9 mm.   - LFTs stable for now    #Occult blood in feces  Positive Occult blood with dropping Hgb daily below goal of 6 ( Hx of SS). Patient with melena during hospital stay requiring pRBC to meet goal and with inappropriate Hgb response.  S/p 10 U of pRBCs since the beginning of April given downtrending Hgb  Maintain active T & S  C/w PPI 40 mg IV BID for now.  GI consulted given downtrending Hgb, melanotic stool, and positive stool occult blood feces. S/p EGD with cauterization of gastric ulcer & hemostatic spraying of three duodenal ulcers.     #Diet:  - NPO & no NGT (Until 48 hrs after EGD). Hold all PO meds and seizure medications converted to IV.   - GI unable to place PEG due to hepatomegaly.   - Per IR consult: Given no safe window for endoscopic placement, recommend surgical consultation.   -IVFs while NPO  -Surgery reconsulted & will attempt Open G tube after discussion with brother      HEMATOLOGIC/ONCOLGOGIC   #Sickle cell crisis--Resolved  #Acute blood loss anemia- 2/2 sickle cells and uremia vs critical illness vs GIB  Downtrending direct hyperbilirubinemia,  gradually & Increasing reticulocyte count.   - Heme following & recs appreciated  - D/c deferasirox due to current renal failure   - S/p 10 U of pRBC since beginning of April  - Occult blood positive. C/w PPI 40 mg IV BID for now.  - Transfuse as needed Hb >6 goal per Heme.  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   -Pending [facility] discussions  -CTM    #DVT prophylaxis   -Heparin gtt    Endo:  #JUAN    ID:  #Pneumoniae-  likely 2/2 ventilator related  Afebrile with downtrending leukocytosis. CXR showing increased RUL opacification.   - D/c  aztreonam 2000 mg IV qd ( started 3/22- 3/27)   - S/p vancomycin 1500 mg IV qd (4/2); 2nd dose 4/4, 3rd dose 4/5  - 4/2- CXR showing increasing RUL consolidation  - Completed 5d meropenem 500 mg IV qd (started 3/27-3/31); (4/2-4/7).   - CTM resp status    #Temperature  #Uptrending leukocytosis  -If patient has fever (>101 F) would reculture (blood + urine) and remove daniel.  -Repeat CXR (04/17/25)    MSK:  #infiltration  Infiltration of sodium bicarb into left arm, now with arm distension and bullae. Elevated uric acid with nodule suggestive of podagra however no signs of active gout flare.   -Appreciate orthopedic hand surgery, not compartment syndrome. Signed off  - CTM    Lines:   Only peripheral lines    Ethics: Full Code   Palliative Team: Consulted MICU Downgrade Note  ---------------------------    Transfer from: MICU  Transfer to:  (  ) Medicine    (  ) Telemetry    (x) RCU    (  ) Palliative    (  ) Stroke Unit    (  ) _______________  ACP: Kan Jackson    HPI/MICU COURSE/OVN EVENTS:  44M with PMHx of Sickle cell disease (last transfusion/exchange transfusion 12/2024), prior acute chest syndrome, iron overload, Gout, HTN and RUE DVT 12/2024 on eliquis (on hold since 3/15 for upcoming cystoscopy, ureteroscopy on 3/19) who was sent in by his Hematologist for transfusion/low Hg prior to procedure (Hg of 5.3). Patient c/o back/LE pain at time of visit secondary to being on sickle cell crisis. Denies SOB or chest pain. No recent fevers, chills or sick contacts. No cough. No nausea/vomiting or abdominal pain. No melena or BRBPR. No urinary complaints, denies hematuria. Good appetite. In ER /85, , RR 18, Temp 98.7F, O2 Sat 88% RA-placed on 4L NC. Received Tylenol IV, benadryl 50mg IV, dilaudid 2mg IV x3, 1U PRBC and started on PCA pump with heme following. Urology consulted for prior planned cystoscopy w/ L ureteroscopy and bx now s/p L ureteroscopy w/ biopsy and stent placement on 3/19. 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 On 3/20, patient had RRT for hypoglycemia that resolved with d50, however had another RRT for repeated hypoglycemia, however patient found to be pulseless, code blue called for PEA. ROSC achieved in approximately 4 minutes and patient intubated and transferred to MICU. Subsequent CT abdomen and pelvis on 3/20/25 showed left perirenal and subcapsular hemorrhage without foci of active extravasation. IR consulted for renal angio with possible embolization. Patient underwent Left renal angiogram and embolization with IR. Neurology consulted and CT & MRI Brain concerning for anoxic brain injury. Subsequently, patient found to have non-invasive urothelial cancer and acute renal failure and placed on HD (discontinued on 04/14/25) . Patient s/p tracheostomy & was pending PEG tube placement, which was denied by IR and GI due to no safe window for endoscopic placement. Surgery consulted and agreed only after GOC discussion, which was done on 04/16/25. Patient is currently full code, no limitations on medical/surgical management, and pending Open G tube placement by Surgery on 04/18/25.     For Follow-Up:  [ ] Hold Heparin gtt @ [facility] (04/17-04/18) for open G tube placement  [ ] Transfuse Hgb >7 for procedure on 04/18  [ ] Further GOC discussions with family  [ ] Monitor Sodium levels (on D5W)  [ ] Transition to PO medications when possible    REVIEW OF SYSTEMS: Not elicited due to AAOx0    MEDICATIONS:  albuterol/ipratropium for Nebulization 3 milliLiter(s) Nebulizer every 6 hours  chlorhexidine 2% Cloths 1 Application(s) Topical daily  dextrose 5%. 1000 milliLiter(s) IV Continuous <Continuous>  dextrose 50% Injectable 25 Gram(s) IV Push once  diazepam  Injectable 10 milliGRAM(s) IV Push every 4 hours  heparin  Infusion.  Unit(s)/Hr IV Continuous <Continuous>  influenza   Vaccine 0.5 milliLiter(s) IntraMuscular once  levETIRAcetam   Injectable 500 milliGRAM(s) IV Push every 12 hours  pantoprazole  Injectable 40 milliGRAM(s) IV Push every 12 hours  petrolatum Ophthalmic Ointment 1 Application(s) Both EYES two times a day      T(C): 37.5 (04-17-25 @ 12:00), Max: 38.1 (04-16-25 @ 16:00)  HR: 89 (04-17-25 @ 15:00) (67 - 119)  BP: 154/90 (04-17-25 @ 15:00) (122/68 - 161/94)  RR: 20 (04-17-25 @ 15:00) (19 - 25)  SpO2: 100% (04-17-25 @ 15:00) (98% - 100%)  Wt(kg): --Vital Signs Last 24 Hrs  T(C): 37.5 (17 Apr 2025 12:00), Max: 38.1 (16 Apr 2025 16:00)  T(F): 99.5 (17 Apr 2025 12:00), Max: 100.6 (16 Apr 2025 16:00)  HR: 89 (17 Apr 2025 15:00) (67 - 119)  BP: 154/90 (17 Apr 2025 15:00) (122/68 - 161/94)  BP(mean): 108 (17 Apr 2025 15:00) (82 - 113)  RR: 20 (17 Apr 2025 15:00) (19 - 25)  SpO2: 100% (17 Apr 2025 15:00) (98% - 100%)    Parameters below as of 17 Apr 2025 15:00  Patient On (Oxygen Delivery Method): ventilator    O2 Concentration (%): 30    PHYSICAL EXAM:  General: NAD, normal habitus, mild jaundice. Yawning and biting down on tube. Pupils staring down  Neuro: AAOx0, sedated & non responsive to noxious stimuli, closed eyes, 5 mm pupils, sluggishly reactive pupils b/l. Pupillary reflex, corneal reflex, and gag reflex intact.  HEENT: icteric sclerae b/l, tracheostomy clean/dry/intact. Eyes more open today  Chest: non-tender to palpation  Heart: S1/S2, RRR   Lungs: Coarse breath sounds  Abd: soft, non-tender, but slightly distended.  Ext: Generalized trace pitting edema, LUE wrapped, nodule on R 1st digit proximal phalanges   Pulses: radial 2+ b/l, dorsalis pedis 2+ b/l   Lines/tubes/drains: NGT, Daniel, & Flexiseal  Psych: unable to assess     Consultant(s) Notes Reviewed:  [x ] YES  [ ] NO  Care Discussed with Consultants/Other Providers [ x] YES  [ ] NO    LABS:                        6.7    20.69 )-----------( 277      ( 17 Apr 2025 09:39 )             20.1     04-17    148[H]  |  116[H]  |  55[H]  ----------------------------<  107[H]  3.7   |  18[L]  |  2.55[H]    Ca    8.3[L]      17 Apr 2025 00:40  Phos  5.0     04-17  Mg     1.60     04-17    TPro  6.5  /  Alb  2.4[L]  /  TBili  6.3[H]  /  DBili  x   /  AST  97[H]  /  ALT  36  /  AlkPhos  128[H]  04-17    PT/INR - ( 17 Apr 2025 00:40 )   PT: 20.7 sec;   INR: 1.75 ratio         PTT - ( 17 Apr 2025 00:40 )  PTT:28.8 sec  Urinalysis Basic - ( 17 Apr 2025 00:40 )    Color: x / Appearance: x / SG: x / pH: x  Gluc: 107 mg/dL / Ketone: x  / Bili: x / Urobili: x   Blood: x / Protein: x / Nitrite: x   Leuk Esterase: x / RBC: x / WBC x   Sq Epi: x / Non Sq Epi: x / Bacteria: x      CAPILLARY BLOOD GLUCOSE      POCT Blood Glucose.: 118 mg/dL (17 Apr 2025 00:26)  POCT Blood Glucose.: 125 mg/dL (16 Apr 2025 17:38)        Urinalysis Basic - ( 17 Apr 2025 00:40 )    Color: x / Appearance: x / SG: x / pH: x  Gluc: 107 mg/dL / Ketone: x  / Bili: x / Urobili: x   Blood: x / Protein: x / Nitrite: x   Leuk Esterase: x / RBC: x / WBC x   Sq Epi: x / Non Sq Epi: x / Bacteria: x        RADIOLOGY & ADDITIONAL TESTS:    Imaging Personally Reviewed:  [x ] YES  [ ] NO      Assessment and Plan: 	  Patient is 44y Male with PMHx of sickle cell disease admitted for anemia concerning for sickle cell crisis s/p ureteroscopy w/ L kidney biopsy 3/19, post-operative course c/b acute hemorrhagic shock, PEA arrest with ROSC; currently with acute renal failure on HD and global anoxic brain injury and urothelial cancer; s/p tracheostomy and pending GOC discussions to decide on possible interventions and dispo. GI consulted for melena patient s/p EGD with cauterization of gastric ulcer & hemostatic spraying of three duodenal ulcers. Patient full code and pending open G tube placement.     NEURO:  #Global anoxic brain injury   AAOx0.  Guarded prognosis.   - Since no NGT for now, c/w IVP Valium 10 mg Q4H standing and 10 mg Q4H PRN for myoclonus (switch to Valium 40 mg PO TID when possible).   - Repeat CT B 3/24 showing diffuse sulcal effacement with increased intracranial pressure, and few patchy foci of cortical blurring   - 3/25 MRIB with diffuse global abnormal supratentorial cortical restricted diffusion consistent with global hypoxic injury     #Seizures  No further myoclonus of upper body off propofol  - Witness myoclonic jerking concerning for seizures iso anoxic brain injury  - s/p 2.5g keppra load & midazolam 2mg IV q8 PRN   - vEEG report: "Abundant myoclonic seizures in the setting of a severe diffuse/multifocal cerebral dysfunction which can be seen in the setting of sedative effect or due to an anoxic injury, with improvement after 20:50 suggesting a lowering of sedation"; will f/u with neuro recommendations. Repeat vEEG 3/24 showing severe cerebral dysfunction   -C/w levetiracetam 500 mg IV BID & Valium as above.   -Completely off propofol      CV:  #Shock - hemorrhagic shock s/p kidney biopsy s/p 5u pRBC, 3 plasma, 3 plt---RESOLVED  last dose of lovenox AC on 3/18 at 5PM   - Monitor cbc q24  - Hg goal >6 per heme/onc  - GI consulted given downtrending Hgb, melanotic stool, and positive stool occult blood feces. S/p EGD with cauterization of gastric ulcer & hemostatic spraying of three duodenal ulcers.     #Hx of VTE (R IJ thrombus, L brachial DVT)  - Heparin gtt  - Appreciate orthopedics hand surgery consult, not compartment syndrome. Signed off    PULM:  #Acute hypoxic respiratory failure  #Pna- 2/2 ventilation requirement  #Tracheostomy   CXR with R lung field and left mid and lower lung field hazy opacities.  - continue with duonebs Q6H & s/p HTS  - 3/28 CXR showing increasing RUL consolidation   -4/4 bedside tracheostomy   -S/p Abx completion course as below and currently on meropenem 500 mg Q24H (04/02 - 04/07)  -C/w trach care (Size 7 portex percutaneous tracheostomy). Provide as much comfort as possible on vent settings (PC if PS not tolerated).    RENAL:  #Acute renal failure.   #Renal mass s/p biopsy -   #Acidosis  Oliguric & receiving Bumex IVP PRN per nephro recs  Acute renal failure s/p cardiac arrest most likely 2/2 ATN requiring intermittent HD with nephro following  - TOV 3/28, failed; replaced daniel on 4/1; Attempt TOV when possible pending GOCs  - Trend Cr, UOP, lytes  - S/p bicarb drip completion & sodium bicarbonate 650 mg po TIDcompletion  - Per pathology result of renal biopsy, noninvasive urothelial malignancy   - HD sessions per nephro. L. IJ shiley removed and no HD needs for now    #Hypernatremia  Still Hypernatremic s/p 100 cc/hr D5 + 1/2 NS for 6 hrs. Will start D5% @ 75 cc/hr for 20 hrs for now. Repeat BMP afterwards and monitor sodium.    GI:  #Shock liver  Unchanged  LFTs. Jaundice with hyperbilirubinemia (direct).   - Trend liver enzymes  - RUQ US showing enlarged periportal and periaortic enlarged lymph nodes and enlarged left lateral liver lobe and CBD 9 mm.   - LFTs stable for now    #Occult blood in feces  Positive Occult blood with dropping Hgb daily below goal of 6 ( Hx of SS). Patient with melena during hospital stay requiring pRBC to meet goal and with inappropriate Hgb response.  S/p 10 U of pRBCs since the beginning of April given downtrending Hgb  Maintain active T & S  C/w PPI 40 mg IV BID for now.  GI consulted given downtrending Hgb, melanotic stool, and positive stool occult blood feces. S/p EGD with cauterization of gastric ulcer & hemostatic spraying of three duodenal ulcers.     #Diet:  - NPO & no NGT (Until 48 hrs after EGD). Hold all PO meds and seizure medications converted to IV.   - GI unable to place PEG due to hepatomegaly.   - Per IR consult: Given no safe window for endoscopic placement, recommend surgical consultation.   -IVFs while NPO  -Surgery reconsulted & will attempt Open G tube after discussion with brother      HEMATOLOGIC/ONCOLGOGIC   #Sickle cell crisis--Resolved  #Acute blood loss anemia- 2/2 sickle cells and uremia vs critical illness vs GIB  Downtrending direct hyperbilirubinemia,  gradually & Increasing reticulocyte count.   - Heme following & recs appreciated  - D/c deferasirox due to current renal failure   - S/p 10 U of pRBC since beginning of April  - Occult blood positive. C/w PPI 40 mg IV BID for now.  - Transfuse as needed Hb >6 goal per Heme.  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   -Pending [facility] discussions  -CTM    #DVT prophylaxis   -Heparin gtt    Endo:  #JUAN    ID:  #Pneumoniae-  likely 2/2 ventilator related  Afebrile with downtrending leukocytosis. CXR showing increased RUL opacification.   - D/c  aztreonam 2000 mg IV qd ( started 3/22- 3/27)   - S/p vancomycin 1500 mg IV qd (4/2); 2nd dose 4/4, 3rd dose 4/5  - 4/2- CXR showing increasing RUL consolidation  - Completed 5d meropenem 500 mg IV qd (started 3/27-3/31); (4/2-4/7).   - CTM resp status    #Temperature  #Uptrending leukocytosis  -If patient has fever (>101 F) would reculture (blood + urine) and remove daniel.  -Repeat CXR (04/17/25)    MSK:  #infiltration  Infiltration of sodium bicarb into left arm, now with arm distension and bullae. Elevated uric acid with nodule suggestive of podagra however no signs of active gout flare.   -Appreciate orthopedic hand surgery, not compartment syndrome. Signed off  - CTM    Lines:   Only peripheral lines    Ethics: Full Code   Palliative Team: Consulted

## 2025-04-17 NOTE — PROGRESS NOTE ADULT - ASSESSMENT
44M with history of sickle cell disease with multiple crises, urothelial ca, HTN, RUE DVT on Eliquis (held since 3/15), admitted for anemia on outpatient blood work. Underwent L ureteroscopy, L kidney mass biopsy, and stent placement 3/19/25 c/b perinephric hemorrhagic shock with PEA arrest, ROSC after 4 minutes however unclear anoxic time; s/p IR embolization of left renal artery 3/20.  EGD performed by GI for persistent melena on 4/11/25 where a gastric ulcer was cauterized and hemostatic agent was sprayed on 3x duodenal ulcers.  GOC discussion with family on 4/16/25 where family discussed wanting to establish feeding access for the patient for which surgical team was reconsulted.  Per GI and IR no safe window for percutaneous/endoscopic placement due to hepatomegaly.     Patient has been off pressors, lactate 1.5, has not needed blood products in past 24 hours.  Currently has brainstem reflexes.  On hepgtt for RUE DVT.  MATA improving.    RECS  - Continue GOC discussions with family   - Plan for open G tube placement with Dr. Warner on 4/18/25    B Team Surgery  [id number]

## 2025-04-17 NOTE — PROGRESS NOTE ADULT - PROBLEM SELECTOR PLAN 1
Pt. with MATA in setting of hemorrhagic shock and PEA arrest. Pt. with most likely ATN. Scr on admission was 1.25 (3/15/25). Scr progressively worsened to 6.04 on 3/24/25. Renal US done on 3/20/25 showed large left renal subcapsular hematoma. Pt. received HD from 3/24/25 to 3/29/25 for oliguric kidney failure. Pt. restarted on HD 4/4/25 for metabolic acidosis and uremia. Last HD treatment was on 4/11/25. HD catheter was removed 4/15/25. Labs from today reviewed. MATA resolving at present. Scr improved to 2.55 today. Pt. non-oliguric with ~2.35L of UOP in last 24 hours. Monitor labs and UOP. Avoid nephrotoxins. Dose medications as per eGFR.     Patient with mild hypernatremia noted with SNa 148, consider free water repletion.      If you have any questions, please feel free to contact me  Ricki Huerta MD  Nephrology Fellow  Page [phone] / Microsoft Teams (Preferred); Please PAGE for urgent consults only.  (After 5pm or on weekends please page the on-call fellow) Pt. with MATA in setting of hemorrhagic shock and PEA arrest. Pt. with most likely ATN. Scr on admission was 1.25 (3/15/25). Scr progressively worsened to 6.04 on 3/24/25. Renal US done on 3/20/25 showed large left renal subcapsular hematoma. Pt. received HD from 3/24/25 to 3/29/25 for oliguric kidney failure. Pt. restarted on HD 4/4/25 for metabolic acidosis and uremia. Last HD treatment was on 4/11/25. HD catheter was removed on 4/15/25. Labs from today reviewed. MATA resolving at present. Scr improved to 2.55 today. Pt. non-oliguric with ~2.3L of UOP in last 24 hours. Patient with mild hypernatremia, recommend free water repletion. Monitor labs and UOP. Avoid nephrotoxins. Dose medications as per eGFR.       If you have any questions, please feel free to contact me  Ricki Huerta MD  Nephrology Fellow  Page [phone] / Microsoft Teams (Preferred); Please PAGE for urgent consults only.  (After 5pm or on weekends please page the on-call fellow)

## 2025-04-17 NOTE — PROGRESS NOTE ADULT - SUBJECTIVE AND OBJECTIVE BOX
INTERVAL HPI/OVERNIGHT EVENTS: 1 U pRBC given Hgb = 5.8. Blood extravasated in RUE peripheral IV and Q1H Neurovasc checks ordered.    SUBJECTIVE: Patient seen and examined at bedside. ROS could not be elicited.    VITAL SIGNS:  ICU Vital Signs Last 24 Hrs  T(C): 37.4 (17 Apr 2025 08:00), Max: 38.1 (16 Apr 2025 16:00)  T(F): 99.3 (17 Apr 2025 08:00), Max: 100.6 (16 Apr 2025 16:00)  HR: 101 (17 Apr 2025 08:00) (94 - 119)  BP: 141/87 (17 Apr 2025 08:00) (122/68 - 161/94)  BP(mean): 102 (17 Apr 2025 08:00) (82 - 111)  ABP: --  ABP(mean): --  RR: 20 (17 Apr 2025 08:00) (16 - 25)  SpO2: 100% (17 Apr 2025 08:00) (98% - 100%)    O2 Parameters below as of 17 Apr 2025 08:00  Patient On (Oxygen Delivery Method): ventilator    O2 Concentration (%): 30      Mode: AC/ CMV (Assist Control/ Continuous Mandatory Ventilation), RR (machine): 20, TV (machine): 460, FiO2: 30, PEEP: 6, ITime: 0.9, MAP: 11, PIP: 24  Plateau pressure:   P/F ratio:     04-16 @ 07:01  -  04-17 @ 07:00  --------------------------------------------------------  IN: 2220 mL / OUT: 2750 mL / NET: -530 mL    I & Os   7A-7P 7P-7A 24h 7A-   dextrose 5% + ... 420 0 420 0   dextrose 5% + ... 500 0 500 0   IV PiggyBack  0   Packed Red Blo... 0 300 300 0   Total In 1220 1000 2220 0   Stool 200 200 400 0   Urine: Indwell... 1025 1325 2350 100   Total Out 1225 1525 2750 100   TOTAL NET -5 -525 -530 -100         CAPILLARY BLOOD GLUCOSE      POCT Blood Glucose.: 118 mg/dL (17 Apr 2025 00:26)      ECG: reviewed.    PHYSICAL EXAM:  General: NAD, normal habitus, mild jaundice. Yawning and biting down on tube. Pupils staring down  Neuro: AAOx0, sedated & non responsive to noxious stimuli, closed eyes, 5 mm pupils, sluggishly reactive pupils b/l. Pupillary reflex, corneal reflex, and gag reflex intact.  HEENT: icteric sclerae b/l, tracheostomy clean/dry/intact. Eyes more open today  Chest: non-tender to palpation  Heart: S1/S2, RRR   Lungs: Coarse breath sounds  Abd: soft, non-tender, but slightly distended.  Ext: Generalized trace pitting edema, LUE wrapped, nodule on R 1st digit proximal phalanges   Pulses: radial 2+ b/l, dorsalis pedis 2+ b/l   Lines/tubes/drains: NGT, Dave, & Flexiseal  Psych: unable to assess       MEDICATIONS:  MEDICATIONS  (STANDING):  albuterol/ipratropium for Nebulization 3 milliLiter(s) Nebulizer every 6 hours  chlorhexidine 2% Cloths 1 Application(s) Topical daily  dextrose 5%. 1000 milliLiter(s) (50 mL/Hr) IV Continuous <Continuous>  dextrose 50% Injectable 25 Gram(s) IV Push once  diazepam  Injectable 10 milliGRAM(s) IV Push every 4 hours  influenza   Vaccine 0.5 milliLiter(s) IntraMuscular once  levETIRAcetam   Injectable 500 milliGRAM(s) IV Push every 12 hours  pantoprazole  Injectable 40 milliGRAM(s) IV Push every 12 hours  petrolatum Ophthalmic Ointment 1 Application(s) Both EYES two times a day    MEDICATIONS  (PRN):  sodium chloride 0.9% lock flush 10 milliLiter(s) IV Push every 1 hour PRN Pre/post blood products, medications, blood draw, and to maintain line patency      ALLERGIES:  Allergies    penicillin (Pruritus)  hydroxyurea (Other)  piperacillin-tazobactam (Urticaria)  ceftriaxone (Anaphylaxis)    Intolerances        LABS:                        5.8    21.74 )-----------( 254      ( 17 Apr 2025 00:40 )             17.0     04-17    148[H]  |  116[H]  |  55[H]  ----------------------------<  107[H]  3.7   |  18[L]  |  2.55[H]    Ca    8.3[L]      17 Apr 2025 00:40  Phos  5.0     04-17  Mg     1.60     04-17    TPro  6.5  /  Alb  2.4[L]  /  TBili  6.3[H]  /  DBili  x   /  AST  97[H]  /  ALT  36  /  AlkPhos  128[H]  04-17    PT/INR - ( 17 Apr 2025 00:40 )   PT: 20.7 sec;   INR: 1.75 ratio         PTT - ( 17 Apr 2025 00:40 )  PTT:28.8 sec  Urinalysis Basic - ( 17 Apr 2025 00:40 )    Color: x / Appearance: x / SG: x / pH: x  Gluc: 107 mg/dL / Ketone: x  / Bili: x / Urobili: x   Blood: x / Protein: x / Nitrite: x   Leuk Esterase: x / RBC: x / WBC x   Sq Epi: x / Non Sq Epi: x / Bacteria: x      ABG:      vBG:  pH, Venous: 7.43 (04-17-25 @ 00:40)  pCO2, Venous: 31 mmHg (04-17-25 @ 00:40)  pO2, Venous: 132 mmHg (04-17-25 @ 00:40)  HCO3, Venous: 21 mmol/L (04-17-25 @ 00:40)    Micro:    Culture - Blood (collected 04-08-25 @ 05:25)  Source: Blood Blood-Peripheral  Final Report (04-13-25 @ 11:00):    No growth at 5 days    Culture - Blood (collected 03-20-25 @ 10:04)  Source: Blood Blood-Peripheral  Final Report (03-25-25 @ 13:01):    No growth at 5 days        Culture - Sputum (collected 04-08-25 @ 08:00)  Source: ET Tube ET Tube  Gram Stain (04-08-25 @ 19:33):    Moderate Squamous epithelial cells per low power field    Few polymorphonuclear leukocytes per low power field    Few Yeast like cells per oil power field  Final Report (04-10-25 @ 09:06):    Commensal lay consistent with body site    Culture - Sputum (collected 03-26-25 @ 11:45)  Source: ET Tube ET Tube  Gram Stain (03-26-25 @ 22:28):    Few Squamous epithelial cells per low power field    Moderate polymorphonuclear leukocytes per low power field    Few Yeast per oil power field  Final Report (03-28-25 @ 08:15):    Commensal lay consistent with body site    Culture - Sputum (collected 03-22-25 @ 17:15)  Source: ET Tube ET Tube  Gram Stain (03-22-25 @ 23:42):    Numerous polymorphonuclear leukocytes per low power field    Rare Squamous epithelial cells per low power field    No organisms seen per oil power field  Final Report (03-24-25 @ 06:52):    Commensal lay consistent with body site         INTERVAL HPI/OVERNIGHT EVENTS: 1 U pRBC given Hgb = 5.8. Blood extravasated in RUE peripheral IV and Q1H Neurovasc checks ordered during pRBC.    SUBJECTIVE: Patient seen and examined at bedside. ROS could not be elicited.    VITAL SIGNS:  ICU Vital Signs Last 24 Hrs  T(C): 37.4 (17 Apr 2025 08:00), Max: 38.1 (16 Apr 2025 16:00)  T(F): 99.3 (17 Apr 2025 08:00), Max: 100.6 (16 Apr 2025 16:00)  HR: 101 (17 Apr 2025 08:00) (94 - 119)  BP: 141/87 (17 Apr 2025 08:00) (122/68 - 161/94)  BP(mean): 102 (17 Apr 2025 08:00) (82 - 111)  ABP: --  ABP(mean): --  RR: 20 (17 Apr 2025 08:00) (16 - 25)  SpO2: 100% (17 Apr 2025 08:00) (98% - 100%)    O2 Parameters below as of 17 Apr 2025 08:00  Patient On (Oxygen Delivery Method): ventilator    O2 Concentration (%): 30      Mode: AC/ CMV (Assist Control/ Continuous Mandatory Ventilation), RR (machine): 20, TV (machine): 460, FiO2: 30, PEEP: 6, ITime: 0.9, MAP: 11, PIP: 24  Plateau pressure:   P/F ratio:     04-16 @ 07:01  -  04-17 @ 07:00  --------------------------------------------------------  IN: 2220 mL / OUT: 2750 mL / NET: -530 mL    I & Os   7A-7P 7P-7A 24h 7A-   dextrose 5% + ... 420 0 420 0   dextrose 5% + ... 500 0 500 0   IV PiggyBack  0   Packed Red Blo... 0 300 300 0   Total In 1220 1000 2220 0   Stool 200 200 400 0   Urine: Indwell... 1025 1325 2350 100   Total Out 1225 1525 2750 100   TOTAL NET -5 -525 -530 -100         CAPILLARY BLOOD GLUCOSE      POCT Blood Glucose.: 118 mg/dL (17 Apr 2025 00:26)      ECG: reviewed.    PHYSICAL EXAM:  General: NAD, normal habitus, mild jaundice. Yawning and biting down on tube. Pupils staring down  Neuro: AAOx0, sedated & non responsive to noxious stimuli, closed eyes, 5 mm pupils, sluggishly reactive pupils b/l. Pupillary reflex, corneal reflex, and gag reflex intact.  HEENT: icteric sclerae b/l, tracheostomy clean/dry/intact. Eyes more open today  Chest: non-tender to palpation  Heart: S1/S2, RRR   Lungs: Coarse breath sounds  Abd: soft, non-tender, but slightly distended.  Ext: Generalized trace pitting edema, LUE wrapped, nodule on R 1st digit proximal phalanges   Pulses: radial 2+ b/l, dorsalis pedis 2+ b/l   Lines/tubes/drains: NGT, Dave, & Flexiseal  Psych: unable to assess       MEDICATIONS:  MEDICATIONS  (STANDING):  albuterol/ipratropium for Nebulization 3 milliLiter(s) Nebulizer every 6 hours  chlorhexidine 2% Cloths 1 Application(s) Topical daily  dextrose 5%. 1000 milliLiter(s) (50 mL/Hr) IV Continuous <Continuous>  dextrose 50% Injectable 25 Gram(s) IV Push once  diazepam  Injectable 10 milliGRAM(s) IV Push every 4 hours  influenza   Vaccine 0.5 milliLiter(s) IntraMuscular once  levETIRAcetam   Injectable 500 milliGRAM(s) IV Push every 12 hours  pantoprazole  Injectable 40 milliGRAM(s) IV Push every 12 hours  petrolatum Ophthalmic Ointment 1 Application(s) Both EYES two times a day    MEDICATIONS  (PRN):  sodium chloride 0.9% lock flush 10 milliLiter(s) IV Push every 1 hour PRN Pre/post blood products, medications, blood draw, and to maintain line patency      ALLERGIES:  Allergies    penicillin (Pruritus)  hydroxyurea (Other)  piperacillin-tazobactam (Urticaria)  ceftriaxone (Anaphylaxis)    Intolerances        LABS:                        5.8    21.74 )-----------( 254      ( 17 Apr 2025 00:40 )             17.0     04-17    148[H]  |  116[H]  |  55[H]  ----------------------------<  107[H]  3.7   |  18[L]  |  2.55[H]    Ca    8.3[L]      17 Apr 2025 00:40  Phos  5.0     04-17  Mg     1.60     04-17    TPro  6.5  /  Alb  2.4[L]  /  TBili  6.3[H]  /  DBili  x   /  AST  97[H]  /  ALT  36  /  AlkPhos  128[H]  04-17    PT/INR - ( 17 Apr 2025 00:40 )   PT: 20.7 sec;   INR: 1.75 ratio         PTT - ( 17 Apr 2025 00:40 )  PTT:28.8 sec  Urinalysis Basic - ( 17 Apr 2025 00:40 )    Color: x / Appearance: x / SG: x / pH: x  Gluc: 107 mg/dL / Ketone: x  / Bili: x / Urobili: x   Blood: x / Protein: x / Nitrite: x   Leuk Esterase: x / RBC: x / WBC x   Sq Epi: x / Non Sq Epi: x / Bacteria: x      ABG:      vBG:  pH, Venous: 7.43 (04-17-25 @ 00:40)  pCO2, Venous: 31 mmHg (04-17-25 @ 00:40)  pO2, Venous: 132 mmHg (04-17-25 @ 00:40)  HCO3, Venous: 21 mmol/L (04-17-25 @ 00:40)    Micro:    Culture - Blood (collected 04-08-25 @ 05:25)  Source: Blood Blood-Peripheral  Final Report (04-13-25 @ 11:00):    No growth at 5 days    Culture - Blood (collected 03-20-25 @ 10:04)  Source: Blood Blood-Peripheral  Final Report (03-25-25 @ 13:01):    No growth at 5 days        Culture - Sputum (collected 04-08-25 @ 08:00)  Source: ET Tube ET Tube  Gram Stain (04-08-25 @ 19:33):    Moderate Squamous epithelial cells per low power field    Few polymorphonuclear leukocytes per low power field    Few Yeast like cells per oil power field  Final Report (04-10-25 @ 09:06):    Commensal lay consistent with body site    Culture - Sputum (collected 03-26-25 @ 11:45)  Source: ET Tube ET Tube  Gram Stain (03-26-25 @ 22:28):    Few Squamous epithelial cells per low power field    Moderate polymorphonuclear leukocytes per low power field    Few Yeast per oil power field  Final Report (03-28-25 @ 08:15):    Commensal lay consistent with body site    Culture - Sputum (collected 03-22-25 @ 17:15)  Source: ET Tube ET Tube  Gram Stain (03-22-25 @ 23:42):    Numerous polymorphonuclear leukocytes per low power field    Rare Squamous epithelial cells per low power field    No organisms seen per oil power field  Final Report (03-24-25 @ 06:52):    Commensal lay consistent with body site

## 2025-04-18 ENCOUNTER — TRANSCRIPTION ENCOUNTER (OUTPATIENT)
Age: 45
End: 2025-04-18

## 2025-04-18 LAB
ALBUMIN SERPL ELPH-MCNC: 2.9 G/DL — LOW (ref 3.3–5)
ALP SERPL-CCNC: 159 U/L — HIGH (ref 40–120)
ALT FLD-CCNC: 43 U/L — HIGH (ref 4–41)
ANION GAP SERPL CALC-SCNC: 15 MMOL/L — HIGH (ref 7–14)
APTT BLD: 31.2 SEC — SIGNIFICANT CHANGE UP (ref 24.5–35.6)
AST SERPL-CCNC: 105 U/L — HIGH (ref 4–40)
BASOPHILS # BLD AUTO: 0.2 K/UL — SIGNIFICANT CHANGE UP (ref 0–0.2)
BASOPHILS NFR BLD AUTO: 1 % — SIGNIFICANT CHANGE UP (ref 0–2)
BILIRUB SERPL-MCNC: 6.3 MG/DL — HIGH (ref 0.2–1.2)
BLD GP AB SCN SERPL QL: NEGATIVE — SIGNIFICANT CHANGE UP
BUN SERPL-MCNC: 52 MG/DL — HIGH (ref 7–23)
CALCIUM SERPL-MCNC: 9.1 MG/DL — SIGNIFICANT CHANGE UP (ref 8.4–10.5)
CHLORIDE SERPL-SCNC: 115 MMOL/L — HIGH (ref 98–107)
CO2 SERPL-SCNC: 18 MMOL/L — LOW (ref 22–31)
CREAT SERPL-MCNC: 2.22 MG/DL — HIGH (ref 0.5–1.3)
EGFR: 36 ML/MIN/1.73M2 — LOW
EGFR: 36 ML/MIN/1.73M2 — LOW
EOSINOPHIL # BLD AUTO: 1.26 K/UL — HIGH (ref 0–0.5)
EOSINOPHIL NFR BLD AUTO: 6.5 % — HIGH (ref 0–6)
GLUCOSE BLDC GLUCOMTR-MCNC: 110 MG/DL — HIGH (ref 70–99)
GLUCOSE BLDC GLUCOMTR-MCNC: 110 MG/DL — HIGH (ref 70–99)
GLUCOSE BLDC GLUCOMTR-MCNC: 117 MG/DL — HIGH (ref 70–99)
GLUCOSE SERPL-MCNC: 111 MG/DL — HIGH (ref 70–99)
HCT VFR BLD CALC: 24.1 % — LOW (ref 39–50)
HGB BLD-MCNC: 8 G/DL — LOW (ref 13–17)
IANC: 14.55 K/UL — HIGH (ref 1.8–7.4)
IMM GRANULOCYTES NFR BLD AUTO: 0.6 % — SIGNIFICANT CHANGE UP (ref 0–0.9)
INR BLD: 1.79 RATIO — HIGH (ref 0.85–1.16)
LYMPHOCYTES # BLD AUTO: 12 % — LOW (ref 13–44)
LYMPHOCYTES # BLD AUTO: 2.31 K/UL — SIGNIFICANT CHANGE UP (ref 1–3.3)
MAGNESIUM SERPL-MCNC: 2.1 MG/DL — SIGNIFICANT CHANGE UP (ref 1.6–2.6)
MCHC RBC-ENTMCNC: 29.7 PG — SIGNIFICANT CHANGE UP (ref 27–34)
MCHC RBC-ENTMCNC: 33.2 G/DL — SIGNIFICANT CHANGE UP (ref 32–36)
MCV RBC AUTO: 89.6 FL — SIGNIFICANT CHANGE UP (ref 80–100)
MONOCYTES # BLD AUTO: 0.81 K/UL — SIGNIFICANT CHANGE UP (ref 0–0.9)
MONOCYTES NFR BLD AUTO: 4.2 % — SIGNIFICANT CHANGE UP (ref 2–14)
NEUTROPHILS # BLD AUTO: 14.55 K/UL — HIGH (ref 1.8–7.4)
NEUTROPHILS NFR BLD AUTO: 75.7 % — SIGNIFICANT CHANGE UP (ref 43–77)
NRBC # BLD AUTO: 0 K/UL — SIGNIFICANT CHANGE UP (ref 0–0)
NRBC # FLD: 0 K/UL — SIGNIFICANT CHANGE UP (ref 0–0)
NRBC BLD AUTO-RTO: 0 /100 WBCS — SIGNIFICANT CHANGE UP (ref 0–0)
PHOSPHATE SERPL-MCNC: 4.2 MG/DL — SIGNIFICANT CHANGE UP (ref 2.5–4.5)
PLATELET # BLD AUTO: 335 K/UL — SIGNIFICANT CHANGE UP (ref 150–400)
POTASSIUM SERPL-MCNC: 3.4 MMOL/L — LOW (ref 3.5–5.3)
POTASSIUM SERPL-SCNC: 3.4 MMOL/L — LOW (ref 3.5–5.3)
PROT SERPL-MCNC: 7.9 G/DL — SIGNIFICANT CHANGE UP (ref 6–8.3)
PROTHROM AB SERPL-ACNC: 21.2 SEC — HIGH (ref 9.9–13.4)
RBC # BLD: 2.69 M/UL — LOW (ref 4.2–5.8)
RBC # FLD: 19.1 % — HIGH (ref 10.3–14.5)
RH IG SCN BLD-IMP: POSITIVE — SIGNIFICANT CHANGE UP
SODIUM SERPL-SCNC: 148 MMOL/L — HIGH (ref 135–145)
WBC # BLD: 19.25 K/UL — HIGH (ref 3.8–10.5)
WBC # FLD AUTO: 19.25 K/UL — HIGH (ref 3.8–10.5)

## 2025-04-18 PROCEDURE — 43830 GSTRST OPEN WO CONSTJ TUBE: CPT | Mod: GC

## 2025-04-18 PROCEDURE — 99232 SBSQ HOSP IP/OBS MODERATE 35: CPT | Mod: GC

## 2025-04-18 DEVICE — FEEDING TUBE GASTROSTOMY MIC 20FR: Type: IMPLANTABLE DEVICE | Status: FUNCTIONAL

## 2025-04-18 RX ORDER — LEVETIRACETAM 10 MG/ML
500 INJECTION, SOLUTION INTRAVENOUS
Refills: 0 | Status: DISCONTINUED | OUTPATIENT
Start: 2025-04-18 | End: 2025-05-16

## 2025-04-18 RX ORDER — SODIUM BICARBONATE 1 MEQ/ML
650 SYRINGE (ML) INTRAVENOUS
Refills: 0 | Status: DISCONTINUED | OUTPATIENT
Start: 2025-04-18 | End: 2025-04-20

## 2025-04-18 RX ORDER — HYDROMORPHONE/SOD CHLOR,ISO/PF 2 MG/10 ML
0.5 SYRINGE (ML) INJECTION ONCE
Refills: 0 | Status: DISCONTINUED | OUTPATIENT
Start: 2025-04-18 | End: 2025-04-18

## 2025-04-18 RX ORDER — AMLODIPINE BESYLATE 10 MG/1
5 TABLET ORAL DAILY
Refills: 0 | Status: DISCONTINUED | OUTPATIENT
Start: 2025-04-18 | End: 2025-04-19

## 2025-04-18 RX ORDER — CLONAZEPAM 0.5 MG/1
2 TABLET ORAL EVERY 8 HOURS
Refills: 0 | Status: DISCONTINUED | OUTPATIENT
Start: 2025-04-18 | End: 2025-04-24

## 2025-04-18 RX ORDER — ONDANSETRON HCL/PF 4 MG/2 ML
4 VIAL (ML) INJECTION ONCE
Refills: 0 | Status: DISCONTINUED | OUTPATIENT
Start: 2025-04-18 | End: 2025-04-18

## 2025-04-18 RX ORDER — HYDROMORPHONE/SOD CHLOR,ISO/PF 2 MG/10 ML
0.5 SYRINGE (ML) INJECTION
Refills: 0 | Status: DISCONTINUED | OUTPATIENT
Start: 2025-04-18 | End: 2025-04-18

## 2025-04-18 RX ADMIN — Medication 0.5 MILLIGRAM(S): at 11:05

## 2025-04-18 RX ADMIN — Medication 100 MILLIEQUIVALENT(S): at 07:58

## 2025-04-18 RX ADMIN — CLONAZEPAM 2 MILLIGRAM(S): 0.5 TABLET ORAL at 15:16

## 2025-04-18 RX ADMIN — DIAZEPAM 10 MILLIGRAM(S): 2 TABLET ORAL at 01:09

## 2025-04-18 RX ADMIN — DIAZEPAM 10 MILLIGRAM(S): 2 TABLET ORAL at 07:43

## 2025-04-18 RX ADMIN — IPRATROPIUM BROMIDE AND ALBUTEROL SULFATE 3 MILLILITER(S): .5; 2.5 SOLUTION RESPIRATORY (INHALATION) at 17:19

## 2025-04-18 RX ADMIN — Medication 650 MILLIGRAM(S): at 18:09

## 2025-04-18 RX ADMIN — Medication 0.5 MILLIGRAM(S): at 22:00

## 2025-04-18 RX ADMIN — Medication 0.5 MILLIGRAM(S): at 18:55

## 2025-04-18 RX ADMIN — Medication 1 APPLICATION(S): at 14:56

## 2025-04-18 RX ADMIN — Medication 100 MILLIEQUIVALENT(S): at 14:55

## 2025-04-18 RX ADMIN — LEVETIRACETAM 500 MILLIGRAM(S): 10 INJECTION, SOLUTION INTRAVENOUS at 18:08

## 2025-04-18 RX ADMIN — AMLODIPINE BESYLATE 5 MILLIGRAM(S): 10 TABLET ORAL at 18:08

## 2025-04-18 RX ADMIN — Medication 40 MILLIGRAM(S): at 07:38

## 2025-04-18 RX ADMIN — DIAZEPAM 10 MILLIGRAM(S): 2 TABLET ORAL at 11:21

## 2025-04-18 RX ADMIN — Medication 0.5 MILLIGRAM(S): at 10:50

## 2025-04-18 RX ADMIN — Medication 40 MILLIGRAM(S): at 18:08

## 2025-04-18 RX ADMIN — CLONAZEPAM 2 MILLIGRAM(S): 0.5 TABLET ORAL at 21:22

## 2025-04-18 RX ADMIN — LEVETIRACETAM 500 MILLIGRAM(S): 10 INJECTION, SOLUTION INTRAVENOUS at 07:37

## 2025-04-18 RX ADMIN — Medication 100 MILLIEQUIVALENT(S): at 13:56

## 2025-04-18 RX ADMIN — IPRATROPIUM BROMIDE AND ALBUTEROL SULFATE 3 MILLILITER(S): .5; 2.5 SOLUTION RESPIRATORY (INHALATION) at 03:19

## 2025-04-18 NOTE — ADVANCED PRACTICE NURSE CONSULT - ASSESSMENT
Patient with yellow sclera, Ventilator dependent, + tracheostomy, peristomal skin intact. Bedbound, incontinent of urine and stool, daniel catheter and bowel management system in place. Skin jaundiced, warm, dry with increased moisture in intertriginous folds, generalized edema, scattered areas of hyperpigmentation and hypopigmentation. Right ear with hypopigmentation. LUQ PEG, peritubular skin intact. Midline abdominal dressing clean dry intact.     BL LE warm to touch, +2 edema, +3 palpable DP/PT pulses.   Right plantar foot with scattered areas of edema bullae, largest measuring [id number], serous filled. Periwound with no erythema, no increased warmth, no edema, no induration, no fluctuance no signs or symptoms of overt skin infection.     Left arm: deroofed and scabbed over blisters, dry flaky skin easily removed with cleansing to reveal hypopigmented reepithelialized skin and lifting scabs. No drainage, no odor, no erythema, no increased warmth, no edema, no induration, no fluctuance no signs or symptoms of overt skin infection.

## 2025-04-18 NOTE — PROGRESS NOTE ADULT - PROBLEM SELECTOR PLAN 1
Pt. with MATA in setting of hemorrhagic shock and PEA arrest. Pt. with most likely ATN. Scr on admission was 1.25 (3/15/25). Scr progressively worsened to 6.04 on 3/24/25. Renal US done on 3/20/25 showed large left renal subcapsular hematoma. Pt. received HD from 3/24/25 to 3/29/25 for oliguric kidney failure. Pt. restarted on HD 4/4/25 for metabolic acidosis and uremia. Last HD treatment was on 4/11/25. HD catheter was removed on 4/15/25. Labs from today reviewed. MATA resolving at present. Scr improved to 2.22 today. Pt. non-oliguric with ~1.8L of UOP in last 24 hours. Patient with mild hypernatremia with SNa 148, continue free water repletion. Patient hyperkalemic, recommend potassium repletion. Monitor labs and UOP. Avoid nephrotoxins. Dose medications as per eGFR.     If you have any questions, please feel free to contact me  Ricki Huerta MD  Nephrology Fellow  Page [phone] / Microsoft Teams (Preferred); Please PAGE for urgent consults only.  (After 5pm or on weekends please page the on-call fellow) Pt. with MATA in setting of hemorrhagic shock and PEA arrest. Pt. with most likely ATN. Scr on admission was 1.25 (3/15/25). Scr progressively worsened to 6.04 on 3/24/25. Renal US done on 3/20/25 showed large left renal subcapsular hematoma. Pt. received HD from 3/24/25 to 3/29/25 for oliguric kidney failure. Pt. restarted on HD 4/4/25 for metabolic acidosis and uremia. Last HD treatment was on 4/11/25. HD catheter was removed on 4/15/25. Labs from today reviewed. MATA resolving at present. Scr improved to 2.22 today. Pt. non-oliguric with ~1.8L of UOP in last 24 hours. Patient with mild hypernatremia with SNa 148, continue free water repletion. Pt. with mild hypokalemia, recommend potassium repletion. Monitor labs and UOP. Avoid nephrotoxins. Dose medications as per eGFR.     If you have any questions, please feel free to contact me  Ricki Huerta MD  Nephrology Fellow  Page [phone] / Microsoft Teams (Preferred); Please PAGE for urgent consults only.  (After 5pm or on weekends please page the on-call fellow)

## 2025-04-18 NOTE — CHART NOTE - NSCHARTNOTEFT_GEN_A_CORE
RCU PA NOTE     Called by RN for hypertension to 150s systolic and tachycardia to 130 with ST on monitor. No fever. Klonopin given without improvement. SBP trend 140-150 and norvasc 5 added to regimen. BP stable, however tachycardia continued and given open G tube placement today concern noted for pain. Will given dilaudid 0.5 IVP and monitor, however if no improvement will rehydrate given NPO for procedure.     DEMOND Pickens, PA-C  Department of Medicine/ RCU  In house RCU Spectra [id number]  In house Medicine Beeper [phone]  Reachable via teams RCU PA NOTE     Called by RN for hypertension to 150s systolic, tachypneic 35, and tachycardia to 130 with ST on monitor. No fever. Klonopin given without improvement. SBP trend 140-150 and norvasc 5 added to regimen. BP stable, however tachycardia and tachypnea continued. No bleeding from open G tube placement today. POCUS with IVC 1.89 and LV hyperdynamic. Noted with concern for pain/ discomfort. Will continue to monitor patient.     DEMOND Pickens, PA-C  Department of Medicine/ RCU  In house RCU Spectra [id number]  In house Medicine Beeper [phone]  Reachable via teams

## 2025-04-18 NOTE — PROGRESS NOTE ADULT - NS ATTEND AMEND GEN_ALL_CORE FT
44y Male with PMH of  Sickle cell disease c/b hx of ACS, DVT, presented with low Hg (5/3) s/p bladder biopsy complicated by hemorrhagic shock, course further complicated by cardiac arrest with multiorgan failure and anoxic brain injury       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   - myoclonus  - continue  Keppra  - continue valium 10 IV q4h - wean off as tolerated  - will add medication for pain control   Respiratory - AHRF s/p trach  - continue PS trials, PC if not tolerated  - continue IPV and trach care  Heme - Sickle cell, hemorrhagic shock secondary to biopsy (resolved), R IJ and L brachial DVTs, was off ac  - no clear signs of sickle crisis currently   - c/tm goal  > 6  - hold ac d/t GIB  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   - pantoprazole 40 mg IV bid 44y Male with PMH of  Sickle cell disease c/b hx of ACS, DVT, presented with low Hg (5/3) s/p bladder biopsy complicated by hemorrhagic shock, course further complicated by cardiac arrest with multiorgan failure and anoxic brain injury. Pt transferred overnight from MICU.      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   - myoclonus - now resolved/controlled   - continue  Keppra  - continue valium 10 IV q4h - wean off as tolerated, will transition to po benzodiazepine if tolerated   - will add medication for pain control - likely oxycodone 10 mg q6h   Respiratory - AHRF s/p trach  - continue PS trials, PC if not tolerated  - continue IPV and trach care  Heme - Sickle cell, hemorrhagic shock secondary to biopsy (resolved), R IJ and L brachial DVTs - not on AC due to GIB and hx of hemorrhagic   - no clear signs of sickle crisis currently   - c/tm goal  > 6  Onc - 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   - pantoprazole 40 mg IV bid  ID - off antibiotics at this time     rest of plan as above 44y Male with PMH of  Sickle cell disease c/b hx of ACS, DVT, presented with low Hg (5/3) s/p bladder biopsy complicated by hemorrhagic shock, course further complicated by cardiac arrest with multiorgan failure and anoxic brain injury. Pt transferred overnight from MICU.      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   - myoclonus - now resolved/controlled   - continue  Keppra  - continue valium 10 IV q4h - wean off as tolerated, will transition to po benzodiazepine if tolerated   - will add medication for pain control - likely oxycodone 10 mg q6h   Respiratory - AHRF s/p trach  - continue PS trials, PC if not tolerated  - continue IPV and trach care  Heme - Sickle cell, hemorrhagic shock secondary to biopsy (resolved), R IJ and L brachial DVTs - not on AC due to GIB and hx of hemorrhagic   - no clear signs of sickle crisis currently   - c/tm goal  > 6  Onc - 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   - pantoprazole 40 mg IV bid  ID - off antibiotics at this time   GOC - full code at this time, palliative care has seen patient, signed off as goals are clear. Decision makers are brother and sister, has a large involved family.     rest of plan as above

## 2025-04-18 NOTE — PROGRESS NOTE ADULT - TIME BILLING
Review of the medical record, interpretation of labs, imaging studies, discussion with interdisciplinary team, physical exam and medical decision making as above

## 2025-04-18 NOTE — PROGRESS NOTE ADULT - ATTENDING COMMENTS
Pt. with MATA. Scr decreased to 2.22 today. Pt. non-oliguric. Assessment and plan for MATA as outlined above. Monitor labs and urine output. Avoid nephrotoxins. Dose medications as per eGFR.

## 2025-04-18 NOTE — PROGRESS NOTE ADULT - SUBJECTIVE AND OBJECTIVE BOX
[facility] Division of Kidney Diseases & Hypertension  FOLLOW UP NOTE  [phone]--------------------------------------------------------------------------------  Chief Complaint:MATA    24 hour events/subjective: Patient seen and examined earlier this morning. Patient remains on mechanical ventilator via Trach collar. Unable to obtain ROS due to clinical status.     PAST HISTORY  --------------------------------------------------------------------------------  No significant changes to PMH, PSH, FHx, SHx, unless otherwise noted    ALLERGIES & MEDICATIONS  --------------------------------------------------------------------------------  Allergies    penicillin (Pruritus)  hydroxyurea (Other)  piperacillin-tazobactam (Urticaria)  ceftriaxone (Anaphylaxis)    Intolerances    Standing Inpatient Medications  albuterol/ipratropium for Nebulization 3 milliLiter(s) Nebulizer every 6 hours  chlorhexidine 2% Cloths 1 Application(s) Topical daily  dextrose 5%. 1000 milliLiter(s) IV Continuous <Continuous>  diazepam  Injectable 10 milliGRAM(s) IV Push every 4 hours  influenza   Vaccine 0.5 milliLiter(s) IntraMuscular once  levETIRAcetam   Injectable 500 milliGRAM(s) IV Push every 12 hours  pantoprazole  Injectable 40 milliGRAM(s) IV Push every 12 hours  potassium chloride  10 mEq/100 mL IVPB 10 milliEquivalent(s) IV Intermittent every 1 hour    PRN Inpatient Medications  HYDROmorphone  Injectable 0.5 milliGRAM(s) IV Push every 10 minutes PRN  ondansetron Injectable 4 milliGRAM(s) IV Push once PRN    REVIEW OF SYSTEMS  --------------------------------------------------------------------------------  Unable to obtain ROS due to clinical status.     VITALS/PHYSICAL EXAM  --------------------------------------------------------------------------------  T(C): 36.8 (04-18-25 @ 08:20), Max: 37.5 (04-17-25 @ 12:00)  HR: 88 (04-18-25 @ 08:20) (67 - 94)  BP: 157/95 (04-18-25 @ 08:20) (141/86 - 164/93)  RR: 19 (04-18-25 @ 08:20) (16 - 21)  SpO2: 100% (04-18-25 @ 08:20) (98% - 100%)  Wt(kg): --  Height (cm): 175 (04-18-25 @ 08:20)  Weight (kg): 88.8 (04-18-25 @ 08:20)  BMI (kg/m2): 29 (04-18-25 @ 08:20)  BSA (m2): 2.04 (04-18-25 @ 08:20)    04-17-25 @ 07:01  -  04-18-25 @ 07:00  --------------------------------------------------------  IN: 621 mL / OUT: 1825 mL / NET: -1204 mL    PHYSICAL EXAM:  Gen: ill appearing  HEENT: tracheostomy, on mechanical ventilator  Pulm: +rhonchi B/L   CV: RRR, S1S2+  Abd: +BS, soft  : Jolie noted   Extremities: +LE edema  Neuro: Unresponsive to verbal stimuli  Skin: Warm    LABS/STUDIES  --------------------------------------------------------------------------------              8.0    19.25 >-----------<  335      [04-18-25 @ 04:43]              24.1     148  |  115  |  52  ----------------------------<  111      [04-18-25 @ 04:43]  3.4   |  18  |  2.22        Ca     9.1     [04-18-25 @ 04:43]      Mg     2.10     [04-18-25 @ 04:43]      Phos  4.2     [04-18-25 @ 04:43]    TPro  7.9  /  Alb  2.9  /  TBili  6.3  /  DBili  x   /  AST  105  /  ALT  43  /  AlkPhos  159  [04-18-25 @ 04:43]    Creatinine Trend:  SCr 2.22 [04-18 @ 04:43]  SCr 2.55 [04-17 @ 00:40]  SCr 2.57 [04-16 @ 22:20]  SCr 2.82 [04-16 @ 00:21]  SCr 3.08 [04-15 @ 00:14]   [facility] Division of Kidney Diseases & Hypertension  FOLLOW UP NOTE  [phone]--------------------------------------------------------------------------------    Chief Complaint: MATA    24 hour events/subjective: [facility] [address] [place] [address]. Pt. seen and examined earlier this morning. Pt. remains on mechanical ventilator via trach collar. Unable to obtain ROS due to clinical status.     PAST HISTORY  --------------------------------------------------------------------------------  No significant changes to PMH, PSH, FHx, SHx, unless otherwise noted    ALLERGIES & MEDICATIONS  --------------------------------------------------------------------------------  Allergies    penicillin (Pruritus)  hydroxyurea (Other)  piperacillin-tazobactam (Urticaria)  ceftriaxone (Anaphylaxis)    Intolerances    Standing Inpatient Medications  albuterol/ipratropium for Nebulization 3 milliLiter(s) Nebulizer every 6 hours  chlorhexidine 2% Cloths 1 Application(s) Topical daily  dextrose 5%. 1000 milliLiter(s) IV Continuous <Continuous>  diazepam  Injectable 10 milliGRAM(s) IV Push every 4 hours  influenza   Vaccine 0.5 milliLiter(s) IntraMuscular once  levETIRAcetam   Injectable 500 milliGRAM(s) IV Push every 12 hours  pantoprazole  Injectable 40 milliGRAM(s) IV Push every 12 hours  potassium chloride  10 mEq/100 mL IVPB 10 milliEquivalent(s) IV Intermittent every 1 hour    PRN Inpatient Medications  HYDROmorphone  Injectable 0.5 milliGRAM(s) IV Push every 10 minutes PRN  ondansetron Injectable 4 milliGRAM(s) IV Push once PRN    REVIEW OF SYSTEMS  --------------------------------------------------------------------------------  Unable to obtain ROS due to clinical status.     VITALS/PHYSICAL EXAM  --------------------------------------------------------------------------------  T(C): 36.8 (04-18-25 @ 08:20), Max: 37.5 (04-17-25 @ 12:00)  HR: 88 (04-18-25 @ 08:20) (67 - 94)  BP: 157/95 (04-18-25 @ 08:20) (141/86 - 164/93)  RR: 19 (04-18-25 @ 08:20) (16 - 21)  SpO2: 100% (04-18-25 @ 08:20) (98% - 100%)  Wt(kg): --  Height (cm): 175 (04-18-25 @ 08:20)  Weight (kg): 88.8 (04-18-25 @ 08:20)  BMI (kg/m2): 29 (04-18-25 @ 08:20)  BSA (m2): 2.04 (04-18-25 @ 08:20)    04-17-25 @ 07:01  -  04-18-25 @ 07:00  --------------------------------------------------------  IN: 621 mL / OUT: 1825 mL / NET: -1204 mL    PHYSICAL EXAM:  Gen: ill appearing  HEENT: tracheostomy, on mechanical ventilator  Pulm: +rhonchi B/L   CV: S1S2+  Abd: +BS, soft  : Jolie noted   Extremities: +LE edema  Neuro: Unresponsive to verbal stimuli  Skin: Warm    LABS/STUDIES  --------------------------------------------------------------------------------              8.0    19.25 >-----------<  335      [04-18-25 @ 04:43]              24.1     148  |  115  |  52  ----------------------------<  111      [04-18-25 @ 04:43]  3.4   |  18  |  2.22        Ca     9.1     [04-18-25 @ 04:43]      Mg     2.10     [04-18-25 @ 04:43]      Phos  4.2     [04-18-25 @ 04:43]    TPro  7.9  /  Alb  2.9  /  TBili  6.3  /  DBili  x   /  AST  105  /  ALT  43  /  AlkPhos  159  [04-18-25 @ 04:43]    Creatinine Trend:  SCr 2.22 [04-18 @ 04:43]  SCr 2.55 [04-17 @ 00:40]  SCr 2.57 [04-16 @ 22:20]  SCr 2.82 [04-16 @ 00:21]  SCr 3.08 [04-15 @ 00:14]

## 2025-04-18 NOTE — ADVANCED PRACTICE NURSE CONSULT - RECOMMEDATIONS
Topical recommendations:   --- Left arm: apply sween24 moisturizer daily.  --- Right plantar foot: apply liquid barrier film to blisters daily, continue to offload.   --- Tracheostomy: Cleanse around trach site with NS. Pat dry. Apply Silicone foam dressing without border beneath trach collar, cut mid dressing to "Y" shape. Change foam dressing every shift and prn. Change trach ties every 3 days.   --- PEG: Cleanse q shift with NS, apply liquid barrier film beneath beatris disc.  If redness noted under beatris disc bumper apply thin foam  dressing without border (Mepilex Lite)- cut to mid dressing with a 'Y' shape. Secondary securement to abdomen with paper tape or securement device.   --- While inpatient continue low air loss bed therapy, continue heel elevation, continue to turn & reposition per protocol, soft pillow between bony prominences, continue moisture management with barrier creams & single breathable pad, continue measures to decrease friction/shear/pressure. Continue with nutritional support as per dietary/orders.     Plan discussed with primary RN at bedside.  Please contact Wound/Ostomy Care Service Line if we can be of further assistance (ext [phone]). Please reconsult if changes to tissue type noted.

## 2025-04-18 NOTE — PROGRESS NOTE ADULT - ASSESSMENT
45 YO M with PMHx of sickle cell disease with prior acute chest syndrome requiring transfusion and exchange in the past (last 12/2024), iron overload, gout, HTN, and RUE DVT in 12/2024 on eliquis who presented on 3/15 by his hematologist second to anemia and hypoxia, down to 5.3 from baseline 7-8, with concern for vaso-occlusive crisis. While on medicine, anemia improved post transfusion and continued on SCC pain control PCA. Patient noted with left renal mass during prior admission and s/p L ureteroscopy with biopsy and stent placement on 3/19. Course complicated with hypoglycemia on 3/20 with RRT x 2 and found with severe anemia to 3.4 with hemorrhagic shock second to left perirenal and subcapsular hemorrhage and ultimately PEA arrest. ROSC achieved and transferred to MICU. s/p IR emobilization and course complicated anoxic brain injury, myoclonic jerks, GIB, MATA, HAGMA, dysphagia, and prolonged vent time s/p trach on 4/4. Transferred to RCU on 4/18.  43 YO M with PMHx of sickle cell disease with prior acute chest syndrome requiring transfusion and exchange in the past (last 12/2024), iron overload, gout, HTN, and RUE DVT in 12/2024 on eliquis who presented on 3/15 by his hematologist second to anemia and hypoxia, down to 5.3 from baseline 7-8, with concern for vaso-occlusive crisis. While on medicine, anemia improved post transfusion and continued on SCC pain control PCA. Patient noted with left renal mass during prior admission and s/p L ureteroscopy with biopsy and stent placement on 3/19. Course complicated with hypoglycemia on 3/20 with RRT x 2 and found with severe anemia to 3.4 with hemorrhagic shock second to left perirenal and subcapsular hemorrhage and ultimately PEA arrest. ROSC achieved and transferred to MICU. s/p IR emobilization and course complicated anoxic brain injury, myoclonic jerks, GIB, MATA, HAGMA, DVTs (R IJ and LUE brachial), dysphagia, cirrhosis, and prolonged vent time s/p trach on 4/4. Transferred to RCU on 4/18.     NEUROLOGY  # Anoxic brain injury   - s/p cardiac arrest on 3/20 with ROSC in 4 minutes   - CT HEAD post arrest with diffuse sulcal effacement with increased intracranial pressure, and few patchy foci of cortical blurring concern for HIE  - MRI brain post arrest with with diffuse global abnormal supratentorial cortical restricted diffusion consistent with global hypoxic injury   - Monitor for now    # Seizures vs myoclonic jerks   - Witness myoclonic jerking concerning for seizures with anoxic brain injury  - vEEG with abundant myoclonic seizures in the setting of a severe diffuse/multifocal cerebral dysfunction which can be seen in the setting of sedative effect or due to anoxia  - Continue on keppra   - Continue on valium 10 IV Q4H and switched to klonopin 2mg TID     CARDIOLOGY  # Shock state likely hemorrhagic vs vasoplegia  - Acute renal bleed noted requiring multiple transfusions and pressors in ICU.   - TTE 3/22 with EF 63 with normal LVRVSF with PASP 49  - Weaned off pressors in ICU and complicated by hypertension.   - Continue on norvasc 5 (started on 4/18)  - Monitor BP     # Autonomia vs pain  - Mild HTN and tachypnea noted likely pain induced vs autonomia?   - Continue on dilaudid pushes as needed     PULMONARY  # Respiratory failure   - Presented with SCC and hypoxia likely from anemia with no signs of acute chest, however noted with PEA with hemorrhagic shock post renal bx requiring intubation.   - Prolonged intubation and s/p 7 PORTEX trach on 4/4   - Continue on vent 20/460/6/30   - Continue on nebs.   - Continue on dilaudid PRN     GI  # Shock liver vs cirrhosis   - Transaminitis noted in ICU and thought to be second to shock liver likely second to hemorrhagic shock vs cardiac arrest, however LFTs remains elevated with acute on chronic hyperbilirubinemia.   - US ABD suggestive of early cirrhotic changes, cholecystectomy, and CBD 9mm   - Trend LFTs and bili   - Monitor scleral ictus    # GIB  - Anemia with GIB noted in ICU   - s/p EGD 4/11 with cauterization of gastric ulcer & hemostatic spraying of three duodenal ulcers. Gastric ulcer likely due to NGT trauma.   - Continue on PPI BID   - Monitor stools and HH     # Oropharyngeal dysphagia   - GI unable to place PEG due to hepatomegaly.   - IR unable to place PEG second to no safe window   - s/p open G TUBE placement on 4/18   - Continue on TF    RENAL:  # Acute renal failure and HAGMA second to shock state   - Scr on admission was 1.25 (3/15/25) and increased to 6s while in ICU and s/p HD 3/24-3/29 and then again on 4/4.   - L SHILE removed on 4/15   - CRE slowly improving but mild acidosis remains   - Continue on HCO3 650 TID tabs for now   - Continue with daniel for now  - Monitor renal function and UOP     # L renal mass bx c/b hematoma   - Renal bx as below on 3/19  - Hemorrhagic shock and PEA arrest on 3/20   - Renal US with large L renal subcapsular hematoma.    - IR left renal angiogram and embolization on 3/20     # Hypernatremia  - s/p D5W   - c/w  Q6H   - Monitor sodium     INFECTIOUS DISEASE   # VAP   - Post arrest noted with concern for aspiration and VAP in ICU   - Flu, SCx, BAL, BCx and UCx negative   - MRSA PCR with MSSA and completed bactroban in ICU   - Completed aztreonam (3/22-3/27) then casey (3/27-3/31) and vanco by dose in ICU   - CXR in ICU with RUL consolidation and completed recurrent casey course in ICU (4/2-4/7).   - Monitor off ABX    HEMATOLOGIC  # Anemia with SCC vs L kidney bleed vs GIB   - With above bleeds s/p 10U PRBC total while in ICU   - Monitor HH     # SCD   - Discontinued deferasirox due to current renal failure   - Monitor Sickle labs QD (CBC with Diff, Retic, BMP, Hepatic Function, LDH and Hapto, and VBG).     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   - Heparin GTT held for G TUBE and plan to resume tomorrow   - LUE precautions     # LUE arm infiltration   - HCO3 GTT infilitration in ICU   - Seen by hand sx and no compartment syndrome concern   - Monitor for now     # RUE blood infiltration   - RUE blood transfusion infiltration noted on 4/17 with area of ecchymosis   - Pending DOPPLER   - Monitor site for now    ENDOCRINE   # Glycemic control   - Monitor BG   - No hx of DM2     SKIN   - Wound care reccs appreciated    ETHICS   - FULL CODE     DISPO - TBD

## 2025-04-18 NOTE — PROGRESS NOTE ADULT - SUBJECTIVE AND OBJECTIVE BOX
RCU PROGRESS NOTE     CHIEF COMPLAINT: Patient is a 44y old  Male who presents with a chief complaint of Referred by Hematologist for low Hg (2025 13:54)      INTERVAL EVENTS:    REVIEW OF SYSTEMS: Seen by bedside during AM rounds and     Mode: AC/ CMV (Assist Control/ Continuous Mandatory Ventilation), RR (machine): 20, TV (machine): 460, FiO2: 30, PEEP: 6, ITime: 0.65, MAP: 11, PIP: 26      OBJECTIVE:  ICU Vital Signs Last 24 Hrs  T(C): 36.4 (2025 04:00), Max: 37.5 (2025 12:00)  T(F): 97.6 (2025 04:00), Max: 99.5 (2025 12:00)  HR: 83 (2025 07:35) (67 - 101)  BP: 156/89 (2025 05:40) (141/86 - 164/93)  BP(mean): 107 (2025 16:00) (102 - 113)  ABP: --  ABP(mean): --  RR: 18 (2025 04:00) (16 - 21)  SpO2: 100% (2025 07:35) (98% - 100%)    O2 Parameters below as of 2025 04:00  Patient On (Oxygen Delivery Method): ventilator    O2 Concentration (%): 30      Mode: AC/ CMV (Assist Control/ Continuous Mandatory Ventilation), RR (machine): 20, TV (machine): 460, FiO2: 30, PEEP: 6, ITime: 0.65, MAP: 11, PIP: 26    04-17 @ 07:01  -  04-18 @ 07:00  --------------------------------------------------------  IN: 621 mL / OUT: 1825 mL / NET: -1204 mL      CAPILLARY BLOOD GLUCOSE      POCT Blood Glucose.: 118 mg/dL (2025 00:26)      HOSPITAL MEDICATIONS:  MEDICATIONS  (STANDING):  albuterol/ipratropium for Nebulization 3 milliLiter(s) Nebulizer every 6 hours  chlorhexidine 2% Cloths 1 Application(s) Topical daily  dextrose 5%. 1000 milliLiter(s) (75 mL/Hr) IV Continuous <Continuous>  diazepam  Injectable 10 milliGRAM(s) IV Push every 4 hours  influenza   Vaccine 0.5 milliLiter(s) IntraMuscular once  levETIRAcetam   Injectable 500 milliGRAM(s) IV Push every 12 hours  pantoprazole  Injectable 40 milliGRAM(s) IV Push every 12 hours  potassium chloride  10 mEq/100 mL IVPB 10 milliEquivalent(s) IV Intermittent every 1 hour    MEDICATIONS  (PRN):      PHYSICAL EXAMINATION    LABS:                        8.0    19.25 )-----------( 335      ( 2025 04:43 )             24.1     04-18    148[H]  |  115[H]  |  52[H]  ----------------------------<  111[H]  3.4[L]   |  18[L]  |  2.22[H]    Ca    9.1      2025 04:43  Phos  4.2     04-18  Mg     2.10     04-18    TPro  7.9  /  Alb  2.9[L]  /  TBili  6.3[H]  /  DBili  x   /  AST  105[H]  /  ALT  43[H]  /  AlkPhos  159[H]  04-18    PT/INR - ( 2025 04:43 )   PT: 21.2 sec;   INR: 1.79 ratio         PTT - ( 2025 04:43 )  PTT:31.2 sec  Urinalysis Basic - ( 2025 04:43 )    Color: x / Appearance: x / SG: x / pH: x  Gluc: 111 mg/dL / Ketone: x  / Bili: x / Urobili: x   Blood: x / Protein: x / Nitrite: x   Leuk Esterase: x / RBC: x / WBC x   Sq Epi: x / Non Sq Epi: x / Bacteria: x        Venous Blood Gas:   @ 00:40  7.43/31/132/21/98.9  VBG Lactate: 1.1      PAST MEDICAL & SURGICAL HISTORY:  Sickle cell anemia      Gout      Deep vein thrombosis (DVT)      Iron overload      Hypertension      History of cholecystectomy      History of removal of Port-a-Cath          FAMILY HISTORY:  Family history of sickle cell trait (Father, Mother)    Patient's father is  (Father)    Patient's mother is  (Mother)        Social History:  Lives alone (15 Mar 2025 09:54)      RADIOLOGY:  [ ] Reviewed and interpreted by me    PULMONARY FUNCTION TESTS:    EKG: RCU PROGRESS NOTE     CHIEF COMPLAINT: Patient is a 44y old  Male who presents with a chief complaint of Referred by Hematologist for low Hg (2025 13:54)      INTERVAL EVENTS:  - Transferred from MICU overnight     REVIEW OF SYSTEMS: Seen by bedside during AM rounds and unable to assess ROS as HIE    Mode: AC/ CMV (Assist Control/ Continuous Mandatory Ventilation), RR (machine): 20, TV (machine): 460, FiO2: 30, PEEP: 6, ITime: 0.65, MAP: 11, PIP: 26      OBJECTIVE:  ICU Vital Signs Last 24 Hrs  T(C): 36.4 (2025 04:00), Max: 37.5 (2025 12:00)  T(F): 97.6 (2025 04:00), Max: 99.5 (2025 12:00)  HR: 83 (2025 07:35) (67 - 101)  BP: 156/89 (2025 05:40) (141/86 - 164/93)  BP(mean): 107 (2025 16:00) (102 - 113)  ABP: --  ABP(mean): --  RR: 18 (2025 04:00) (16 - 21)  SpO2: 100% (2025 07:35) (98% - 100%)    O2 Parameters below as of 2025 04:00  Patient On (Oxygen Delivery Method): ventilator    O2 Concentration (%): 30      Mode: AC/ CMV (Assist Control/ Continuous Mandatory Ventilation), RR (machine): 20, TV (machine): 460, FiO2: 30, PEEP: 6, ITime: 0.65, MAP: 11, PIP: 26    04-17 @ 07:01  -  04-18 @ 07:00  --------------------------------------------------------  IN: 621 mL / OUT: 1825 mL / NET: -1204 mL      CAPILLARY BLOOD GLUCOSE  POCT Blood Glucose.: 118 mg/dL (2025 00:26)      HOSPITAL MEDICATIONS:  MEDICATIONS  (STANDING):  albuterol/ipratropium for Nebulization 3 milliLiter(s) Nebulizer every 6 hours  chlorhexidine 2% Cloths 1 Application(s) Topical daily  dextrose 5%. 1000 milliLiter(s) (75 mL/Hr) IV Continuous <Continuous>  diazepam  Injectable 10 milliGRAM(s) IV Push every 4 hours  influenza   Vaccine 0.5 milliLiter(s) IntraMuscular once  levETIRAcetam   Injectable 500 milliGRAM(s) IV Push every 12 hours  pantoprazole  Injectable 40 milliGRAM(s) IV Push every 12 hours  potassium chloride  10 mEq/100 mL IVPB 10 milliEquivalent(s) IV Intermittent every 1 hour    MEDICATIONS  (PRN):      PHYSICAL EXAMINATION  General: NAD   HEENT: Scleral icterus and trach present  Cards: S1/S2, no murmurs   Pulm: Course vent sounds bilaterally. No wheezes.   Abdomen: Soft, nondistended and nontender.   Extremities: No pedal edema. No active SABINO of BL upper and lower extremities. Bicep activation noted with noxious stimuli.   Neurology: Eyes open intermittently but does not follow commands with HIE with no acute focal neurological deficits     LABS:                        8.0    19.25 )-----------( 335      ( 2025 04:43 )             24.1     04-18    148[H]  |  115[H]  |  52[H]  ----------------------------<  111[H]  3.4[L]   |  18[L]  |  2.22[H]    Ca    9.1      2025 04:43  Phos  4.2     04-18  Mg     2.10     04-18    TPro  7.9  /  Alb  2.9[L]  /  TBili  6.3[H]  /  DBili  x   /  AST  105[H]  /  ALT  43[H]  /  AlkPhos  159[H]  04-18    PT/INR - ( 2025 04:43 )   PT: 21.2 sec;   INR: 1.79 ratio         PTT - ( 2025 04:43 )  PTT:31.2 sec  Urinalysis Basic - ( 2025 04:43 )    Color: x / Appearance: x / SG: x / pH: x  Gluc: 111 mg/dL / Ketone: x  / Bili: x / Urobili: x   Blood: x / Protein: x / Nitrite: x   Leuk Esterase: x / RBC: x / WBC x   Sq Epi: x / Non Sq Epi: x / Bacteria: x        Venous Blood Gas:   @ 00:40  7.43/31/132/21/98.9  VBG Lactate: 1.1      PAST MEDICAL & SURGICAL HISTORY:  Sickle cell anemia      Gout      Deep vein thrombosis (DVT)      Iron overload      Hypertension      History of cholecystectomy      History of removal of Port-a-Cath          FAMILY HISTORY:  Family history of sickle cell trait (Father, Mother)    Patient's father is  (Father)    Patient's mother is  (Mother)        Social History:  Lives alone (15 Mar 2025 09:54)      RADIOLOGY:  [ ] Reviewed and interpreted by me    PULMONARY FUNCTION TESTS:    EKG:

## 2025-04-18 NOTE — CHART NOTE - NSCHARTNOTEFT_GEN_A_CORE
NUTRITION FOLLOW UP NOTE     REASON FOR ASSESSMENT: SEVERE MALNUTRITION FOLLOW UP / ICU follow up     SOURCE:    [x] Medical record [x] RN/PCA  [x]Patient inappropriate (disoriented, nonverbal] [x]Patient unavailable     MEDICAL COURSE:  Per chart, "45 YO M with PMHx of sickle cell disease with prior acute chest syndrome requiring transfusion and exchange in the past (last 12/2024), iron overload, gout, HTN, and RUE DVT in 12/2024 on eliquis who presented on 3/15 by his hematologist second to anemia and hypoxia, down to 5.3 from baseline 7-8, with concern for vaso-occlusive crisis. While on medicine, anemia improved post transfusion and continued on SCC pain control PCA. Patient noted with left renal mass during prior admission and s/p L ureteroscopy with biopsy and stent placement on 3/19. Course complicated with hypoglycemia on 3/20 with RRT x 2 and found with severe anemia to 3.4 with hemorrhagic shock second to left perirenal and subcapsular hemorrhage and ultimately PEA arrest."    DIET PRESCRIPTION:  Diet, NPO (04-12-25 @ 07:25)      NUTRITION COURSE:  Pt off unit for PEG Placement today. Information obtained from comprehensive chart review and per RN today: No reports of any recent episodes of nausea, vomiting, diarrhea or constipation, Last BM noted on 4/16 per RN flowsheets. Pt NPO at time of visit today, for PEG placement. TF recs in nutrition interventions section below.      PERTINENT MEDICATIONS:  MEDICATIONS  (STANDING):  albuterol/ipratropium for Nebulization 3 milliLiter(s) Nebulizer every 6 hours  chlorhexidine 2% Cloths 1 Application(s) Topical daily  dextrose 5%. 1000 milliLiter(s) (75 mL/Hr) IV Continuous <Continuous>  diazepam  Injectable 10 milliGRAM(s) IV Push every 4 hours  influenza   Vaccine 0.5 milliLiter(s) IntraMuscular once  levETIRAcetam   Injectable 500 milliGRAM(s) IV Push every 12 hours  pantoprazole  Injectable 40 milliGRAM(s) IV Push every 12 hours  potassium chloride  10 mEq/100 mL IVPB 10 milliEquivalent(s) IV Intermittent every 1 hour      [x] Relevant notes on medications:  Noted on potassium chloride    PERTINENT LABS:  04-18 Na148 mmol/L[H] Glu 111 mg/dL[H] K+ 3.4 mmol/L[L] Cr  2.22 mg/dL[H] BUN 52 mg/dL[H] 04-18 Phos 4.2 mg/dL 04-18 Alb 2.9 g/dL[L] 04-04 Chol --    LDL --    HDL --    Trig 364 mg/dL[H]    [x] Relevant notes on labs: noted with elevated BUN/ Cr, low potassium Nephro following.     ANTHROPOMETRICS:  Height (cm): 175 (04-18 @ 08:20)  Weight (kg): 88.8 (04-18 @ 08:20), 90.7 (03-12 @ 10:17)  BMI (kg/m2): 29 (04-18 @ 08:20)  Weight Assessment: Pt with non-significant wt loss x ~1 month (-2.1%)     PHYSICAL ASSESSMENT, per flowsheets:  Edema: None noted  Pressure Injury: None      ESTIMATED NEEDS:  [X] No change since previous assessment, Based on Ideal Body Weight +10% 169.4lbs/ 77kg  3753-5218 kcal/d, 25-30 kcal/kg, 92-115gm protein/d, 1.2-1.5gm/kg    PREVIOUS NUTRITION DX: [ x] Malnutrition   Nutrition Diagnosis is [x] ongoing  [ ] resolved [ ] not applicable   New Nutrition Diagnosis: [x] not applicable     EDUCATION:  [ x] N/A 2/2 decreased cognition      RECOMMENDATIONS/INTERVENTIONS:  1) Initiate TF Nepro @ 10ml/hr increase 10ml q4hr until goal rate of  65ml/hr x 18hrs is met  TF provides ( 77kg IBW): 1170 total volume, 2106 kcal ( 27kcal/kg), 95gm protein ( 1.2gm/kg), 854ml free water from formula.   2) monitor for TF tolerance   3) RD to f/u prn     MONITORING & EVALUATING:  Tolerance to diet/supplement, nutrition related lab values, weight trends, BMs/GI distress, hydration status, skin integrity.    Jeanine Massey, MS, RDN | Available on [facility] TE[employer] | Office #[phone]

## 2025-04-18 NOTE — PROGRESS NOTE ADULT - SUBJECTIVE AND OBJECTIVE BOX
24h Events:  No acute events overnight.    Subjective:   Patient seen at bedside this AM. Subjective hx limited by vegetative state.     Objective:  Vital Signs  T(C): 36.4 (04-18 @ 10:40), Max: 37.5 (04-17 @ 12:00)  HR: 109 (04-18 @ 10:40) (67 - 109)  BP: 151/94 (04-18 @ 10:40) (141/86 - 164/93)  RR: 20 (04-18 @ 10:40) (16 - 21)  SpO2: 100% (04-18 @ 10:40) (98% - 100%)  04-17-25 @ 07:01  -  04-18-25 @ 07:00  --------------------------------------------------------  IN:  Total IN: 0 mL    OUT:    Indwelling Catheter - Urethral (mL): 1825 mL  Total OUT: 1825 mL    Total NET: -1825 mL          Physical Exam:  GEN: NAD  RESP: mx ventilated   ABD: s, nd, nttp  EXTR: edematous     Labs:                        8.0    19.25 )-----------( 335      ( 18 Apr 2025 04:43 )             24.1   04-18    148[H]  |  115[H]  |  52[H]  ----------------------------<  111[H]  3.4[L]   |  18[L]  |  2.22[H]    Ca    9.1      18 Apr 2025 04:43  Phos  4.2     04-18  Mg     2.10     04-18    TPro  7.9  /  Alb  2.9[L]  /  TBili  6.3[H]  /  DBili  x   /  AST  105[H]  /  ALT  43[H]  /  AlkPhos  159[H]  04-18    CAPILLARY BLOOD GLUCOSE          Imaging:

## 2025-04-18 NOTE — PROGRESS NOTE ADULT - ATTENDING SUPERVISION STATEMENT
Fellow
Fellow
Resident
Fellow
Resident
Fellow
Resident
Fellow
Resident
Fellow
Resident
Fellow
Fellow
Resident
Fellow

## 2025-04-18 NOTE — CHART NOTE - NSCHARTNOTEFT_GEN_A_CORE
Post operative check    Interval Events: s/p  stramm gastrostomy tube placement    Subjective: subjective limited as patient is on mechanical ventillation with tracheostomy. Appears to be in NAD.    Objective:    Exam:  General: Resting in bed, NAD  Cardiac: Normal rate and rhythm     Respiratory: Mechanical Ventillation through tracheostomy  Abdomen: soft, non tender, non distended, midline incision dressing c/d/i, g tube Clamped without redness or erythema  Extremities: warm, well perfused      Assessment: +44M with history of sickle cell disease with multiple crises, urothelial ca, HTN, RUE DVT on Eliquis (held since 3/15), admitted for anemia on outpatient blood work. Underwent L ureteroscopy, L kidney mass biopsy, and stent placement 3/19/25 c/b perinephric hemorrhagic shock with PEA arrest, ROSC after 4 minutes however unclear anoxic time; s/p IR embolization of left renal artery 3/20.  EGD performed by GI for persistent melena on 4/11/25 where a gastric ulcer was cauterized and hemostatic agent was sprayed on 3x duodenal ulcers.  GOC discussion with family on 4/16/25 where family discussed wanting to establish feeding access for the patient for which surgical team was reconsulted.  Per GI and IR no safe window for percutaneous/endoscopic placement due to hepatomegaly.   NOw s/p stramm gastrostomy tube placement.    Plan:  -can start tube feeds today  - resume Heparin gtt tomorrow AM  - global care per primary team  - please page b [place] surgery at [phone] with any questions/concerns    B team [id number]

## 2025-04-18 NOTE — PROGRESS NOTE ADULT - ASSESSMENT
44M with history of sickle cell disease with multiple crises, urothelial ca, HTN, RUE DVT on Eliquis (held since 3/15), admitted for anemia on outpatient blood work. Underwent L ureteroscopy, L kidney mass biopsy, and stent placement 3/19/25 c/b perinephric hemorrhagic shock with PEA arrest, ROSC after 4 minutes however unclear anoxic time; s/p IR embolization of left renal artery 3/20.  EGD performed by GI for persistent melena on 4/11/25 where a gastric ulcer was cauterized and hemostatic agent was sprayed on 3x duodenal ulcers.  GOC discussion with family on 4/16/25 where family discussed wanting to establish feeding access for the patient for which surgical team was reconsulted.  Per GI and IR no safe window for percutaneous/endoscopic placement due to hepatomegaly.     Patient has been off pressors, lactate 1.5, has not needed blood products in past 24 hours.  Currently has brainstem reflexes.  On hepgtt for RUE DVT.  MATA improving.    RECS  - Plan for open G tube placement with Dr. Warner on 4/18/25    B Team Surgery  [id number]

## 2025-04-18 NOTE — PROGRESS NOTE ADULT - NUTRITIONAL ASSESSMENT
This patient has been assessed with a concern for Malnutrition and has been determined to have a diagnosis/diagnoses of Severe protein-calorie malnutrition.    This patient is being managed with:   Diet NPO-  Entered: Apr 12 2025  7:25AM

## 2025-04-18 NOTE — BRIEF OPERATIVE NOTE - OPERATION/FINDINGS
L ureteroscopy, RGP, biopsy, stent placement
Midline laparotomy incision   Cirrhotic liver   Dereck Gastrostomy   Stomach pexied to anterior abdominal wall   #1 Maxon for Fascial Closure

## 2025-04-18 NOTE — PACU DISCHARGE NOTE - COMMENTS
No anesthesia complications.   Transfer back to RCU.
No apparent anesthesia complications
 while on PCA

## 2025-04-19 ENCOUNTER — RESULT REVIEW (OUTPATIENT)
Age: 45
End: 2025-04-19

## 2025-04-19 LAB
ALBUMIN SERPL ELPH-MCNC: 2.6 G/DL — LOW (ref 3.3–5)
ALP SERPL-CCNC: 192 U/L — HIGH (ref 40–120)
ALT FLD-CCNC: 42 U/L — HIGH (ref 4–41)
ANION GAP SERPL CALC-SCNC: 12 MMOL/L — SIGNIFICANT CHANGE UP (ref 7–14)
AST SERPL-CCNC: 93 U/L — HIGH (ref 4–40)
BASOPHILS # BLD AUTO: 0.15 K/UL — SIGNIFICANT CHANGE UP (ref 0–0.2)
BASOPHILS NFR BLD AUTO: 0.7 % — SIGNIFICANT CHANGE UP (ref 0–2)
BILIRUB DIRECT SERPL-MCNC: 4.5 MG/DL — HIGH (ref 0–0.3)
BILIRUB INDIRECT FLD-MCNC: 1.1 MG/DL — HIGH (ref 0–1)
BILIRUB SERPL-MCNC: 5.6 MG/DL — HIGH (ref 0.2–1.2)
BLOOD GAS VENOUS COMPREHENSIVE RESULT: SIGNIFICANT CHANGE UP
BUN SERPL-MCNC: 55 MG/DL — HIGH (ref 7–23)
CALCIUM SERPL-MCNC: 8.6 MG/DL — SIGNIFICANT CHANGE UP (ref 8.4–10.5)
CHLORIDE SERPL-SCNC: 114 MMOL/L — HIGH (ref 98–107)
CO2 SERPL-SCNC: 18 MMOL/L — LOW (ref 22–31)
CREAT SERPL-MCNC: 2.33 MG/DL — HIGH (ref 0.5–1.3)
EGFR: 34 ML/MIN/1.73M2 — LOW
EGFR: 34 ML/MIN/1.73M2 — LOW
EOSINOPHIL # BLD AUTO: 1.02 K/UL — HIGH (ref 0–0.5)
EOSINOPHIL NFR BLD AUTO: 4.8 % — SIGNIFICANT CHANGE UP (ref 0–6)
GLUCOSE BLDC GLUCOMTR-MCNC: 116 MG/DL — HIGH (ref 70–99)
GLUCOSE BLDC GLUCOMTR-MCNC: 140 MG/DL — HIGH (ref 70–99)
GLUCOSE BLDC GLUCOMTR-MCNC: 145 MG/DL — HIGH (ref 70–99)
GLUCOSE BLDC GLUCOMTR-MCNC: 149 MG/DL — HIGH (ref 70–99)
GLUCOSE SERPL-MCNC: 135 MG/DL — HIGH (ref 70–99)
HAPTOGLOB SERPL-MCNC: 34 MG/DL — SIGNIFICANT CHANGE UP (ref 34–200)
HCT VFR BLD CALC: 20.8 % — CRITICAL LOW (ref 39–50)
HCT VFR BLD CALC: 21.1 % — LOW (ref 39–50)
HGB BLD-MCNC: 6.9 G/DL — CRITICAL LOW (ref 13–17)
HGB BLD-MCNC: 7 G/DL — CRITICAL LOW (ref 13–17)
IANC: 16.53 K/UL — HIGH (ref 1.8–7.4)
IMM GRANULOCYTES NFR BLD AUTO: 0.8 % — SIGNIFICANT CHANGE UP (ref 0–0.9)
LDH SERPL L TO P-CCNC: 327 U/L — HIGH (ref 135–225)
LYMPHOCYTES # BLD AUTO: 1.94 K/UL — SIGNIFICANT CHANGE UP (ref 1–3.3)
LYMPHOCYTES # BLD AUTO: 9.2 % — LOW (ref 13–44)
MAGNESIUM SERPL-MCNC: 1.9 MG/DL — SIGNIFICANT CHANGE UP (ref 1.6–2.6)
MCHC RBC-ENTMCNC: 30 PG — SIGNIFICANT CHANGE UP (ref 27–34)
MCHC RBC-ENTMCNC: 30.2 PG — SIGNIFICANT CHANGE UP (ref 27–34)
MCHC RBC-ENTMCNC: 33.2 G/DL — SIGNIFICANT CHANGE UP (ref 32–36)
MCHC RBC-ENTMCNC: 33.2 G/DL — SIGNIFICANT CHANGE UP (ref 32–36)
MCV RBC AUTO: 90.4 FL — SIGNIFICANT CHANGE UP (ref 80–100)
MCV RBC AUTO: 90.9 FL — SIGNIFICANT CHANGE UP (ref 80–100)
MONOCYTES # BLD AUTO: 1.38 K/UL — HIGH (ref 0–0.9)
MONOCYTES NFR BLD AUTO: 6.5 % — SIGNIFICANT CHANGE UP (ref 2–14)
NEUTROPHILS # BLD AUTO: 16.53 K/UL — HIGH (ref 1.8–7.4)
NEUTROPHILS NFR BLD AUTO: 78 % — HIGH (ref 43–77)
NRBC # BLD AUTO: 0 K/UL — SIGNIFICANT CHANGE UP (ref 0–0)
NRBC # BLD AUTO: 0.02 K/UL — HIGH (ref 0–0)
NRBC # FLD: 0 K/UL — SIGNIFICANT CHANGE UP (ref 0–0)
NRBC # FLD: 0.02 K/UL — HIGH (ref 0–0)
NRBC BLD AUTO-RTO: 0 /100 WBCS — SIGNIFICANT CHANGE UP (ref 0–0)
NRBC BLD AUTO-RTO: 0 /100 WBCS — SIGNIFICANT CHANGE UP (ref 0–0)
PHOSPHATE SERPL-MCNC: 5 MG/DL — HIGH (ref 2.5–4.5)
PLATELET # BLD AUTO: 290 K/UL — SIGNIFICANT CHANGE UP (ref 150–400)
PLATELET # BLD AUTO: 314 K/UL — SIGNIFICANT CHANGE UP (ref 150–400)
POTASSIUM SERPL-MCNC: 3.9 MMOL/L — SIGNIFICANT CHANGE UP (ref 3.5–5.3)
POTASSIUM SERPL-SCNC: 3.9 MMOL/L — SIGNIFICANT CHANGE UP (ref 3.5–5.3)
PROT SERPL-MCNC: 6.9 G/DL — SIGNIFICANT CHANGE UP (ref 6–8.3)
RBC # BLD: 2.3 M/UL — LOW (ref 4.2–5.8)
RBC # BLD: 2.3 M/UL — LOW (ref 4.2–5.8)
RBC # BLD: 2.32 M/UL — LOW (ref 4.2–5.8)
RBC # FLD: 18.5 % — HIGH (ref 10.3–14.5)
RBC # FLD: 18.6 % — HIGH (ref 10.3–14.5)
RETICS #: 66.9 K/UL — SIGNIFICANT CHANGE UP (ref 25–125)
RETICS/RBC NFR: 2.9 % — HIGH (ref 0.5–2.5)
SODIUM SERPL-SCNC: 144 MMOL/L — SIGNIFICANT CHANGE UP (ref 135–145)
WBC # BLD: 20.6 K/UL — HIGH (ref 3.8–10.5)
WBC # BLD: 21.19 K/UL — HIGH (ref 3.8–10.5)
WBC # FLD AUTO: 20.6 K/UL — HIGH (ref 3.8–10.5)
WBC # FLD AUTO: 21.19 K/UL — HIGH (ref 3.8–10.5)

## 2025-04-19 PROCEDURE — 99232 SBSQ HOSP IP/OBS MODERATE 35: CPT

## 2025-04-19 PROCEDURE — 93971 EXTREMITY STUDY: CPT | Mod: 26,RT

## 2025-04-19 RX ORDER — ACETAMINOPHEN 500 MG/5ML
1000 LIQUID (ML) ORAL ONCE
Refills: 0 | Status: COMPLETED | OUTPATIENT
Start: 2025-04-19 | End: 2025-04-19

## 2025-04-19 RX ORDER — SODIUM CHLORIDE 9 G/1000ML
500 INJECTION, SOLUTION INTRAVENOUS ONCE
Refills: 0 | Status: COMPLETED | OUTPATIENT
Start: 2025-04-19 | End: 2025-04-19

## 2025-04-19 RX ORDER — HYDROMORPHONE/SOD CHLOR,ISO/PF 2 MG/10 ML
0.5 SYRINGE (ML) INJECTION ONCE
Refills: 0 | Status: DISCONTINUED | OUTPATIENT
Start: 2025-04-19 | End: 2025-04-19

## 2025-04-19 RX ORDER — HYDROMORPHONE/SOD CHLOR,ISO/PF 2 MG/10 ML
0.5 SYRINGE (ML) INJECTION EVERY 4 HOURS
Refills: 0 | Status: DISCONTINUED | OUTPATIENT
Start: 2025-04-19 | End: 2025-04-24

## 2025-04-19 RX ORDER — SODIUM CHLORIDE 9 G/1000ML
1000 INJECTION, SOLUTION INTRAVENOUS ONCE
Refills: 0 | Status: COMPLETED | OUTPATIENT
Start: 2025-04-19 | End: 2025-04-19

## 2025-04-19 RX ADMIN — SODIUM CHLORIDE 500 MILLILITER(S): 9 INJECTION, SOLUTION INTRAVENOUS at 04:06

## 2025-04-19 RX ADMIN — Medication 0.5 MILLIGRAM(S): at 00:36

## 2025-04-19 RX ADMIN — CLONAZEPAM 2 MILLIGRAM(S): 0.5 TABLET ORAL at 06:00

## 2025-04-19 RX ADMIN — Medication 650 MILLIGRAM(S): at 18:21

## 2025-04-19 RX ADMIN — Medication 650 MILLIGRAM(S): at 06:00

## 2025-04-19 RX ADMIN — CLONAZEPAM 2 MILLIGRAM(S): 0.5 TABLET ORAL at 21:44

## 2025-04-19 RX ADMIN — Medication 0.5 MILLIGRAM(S): at 11:36

## 2025-04-19 RX ADMIN — LEVETIRACETAM 500 MILLIGRAM(S): 10 INJECTION, SOLUTION INTRAVENOUS at 05:59

## 2025-04-19 RX ADMIN — Medication 40 MILLIGRAM(S): at 06:02

## 2025-04-19 RX ADMIN — SODIUM CHLORIDE 1000 MILLILITER(S): 9 INJECTION, SOLUTION INTRAVENOUS at 13:08

## 2025-04-19 RX ADMIN — IPRATROPIUM BROMIDE AND ALBUTEROL SULFATE 3 MILLILITER(S): .5; 2.5 SOLUTION RESPIRATORY (INHALATION) at 10:14

## 2025-04-19 RX ADMIN — CLONAZEPAM 2 MILLIGRAM(S): 0.5 TABLET ORAL at 13:08

## 2025-04-19 RX ADMIN — LEVETIRACETAM 500 MILLIGRAM(S): 10 INJECTION, SOLUTION INTRAVENOUS at 18:20

## 2025-04-19 RX ADMIN — Medication 1 APPLICATION(S): at 11:36

## 2025-04-19 RX ADMIN — Medication 400 MILLIGRAM(S): at 02:34

## 2025-04-19 RX ADMIN — Medication 40 MILLIGRAM(S): at 18:20

## 2025-04-19 RX ADMIN — IPRATROPIUM BROMIDE AND ALBUTEROL SULFATE 3 MILLILITER(S): .5; 2.5 SOLUTION RESPIRATORY (INHALATION) at 15:57

## 2025-04-19 RX ADMIN — IPRATROPIUM BROMIDE AND ALBUTEROL SULFATE 3 MILLILITER(S): .5; 2.5 SOLUTION RESPIRATORY (INHALATION) at 03:20

## 2025-04-19 RX ADMIN — AMLODIPINE BESYLATE 5 MILLIGRAM(S): 10 TABLET ORAL at 06:00

## 2025-04-19 RX ADMIN — IPRATROPIUM BROMIDE AND ALBUTEROL SULFATE 3 MILLILITER(S): .5; 2.5 SOLUTION RESPIRATORY (INHALATION) at 20:31

## 2025-04-19 RX ADMIN — Medication 0.5 MILLIGRAM(S): at 04:06

## 2025-04-19 NOTE — PROGRESS NOTE ADULT - ASSESSMENT
43 YO M with PMHx of sickle cell disease with prior acute chest syndrome requiring transfusion and exchange in the past (last 12/2024), iron overload, gout, HTN, and RUE DVT in 12/2024 on eliquis who presented on 3/15 by his hematologist second to anemia and hypoxia, down to 5.3 from baseline 7-8, with concern for vaso-occlusive crisis. While on medicine, anemia improved post transfusion and continued on SCC pain control PCA. Patient noted with left renal mass during prior admission and s/p L ureteroscopy with biopsy and stent placement on 3/19. Course complicated with hypoglycemia on 3/20 with RRT x 2 and found with severe anemia to 3.4 with hemorrhagic shock second to left perirenal and subcapsular hemorrhage and ultimately PEA arrest. ROSC achieved and transferred to MICU. s/p IR emobilization and course complicated anoxic brain injury, myoclonic jerks, GIB, MATA, HAGMA, DVTs (R IJ and LUE brachial), dysphagia, cirrhosis, and prolonged vent time s/p trach on 4/4. Transferred to [facility] on 4/18.     NEUROLOGY  # Anoxic brain injury   - s/p cardiac arrest on 3/20 with ROSC in 4 minutes   - CT HEAD post arrest with diffuse sulcal effacement with increased intracranial pressure, and few patchy foci of cortical blurring concern for HIE  - MRI brain post arrest with with diffuse global abnormal supratentorial cortical restricted diffusion consistent with global hypoxic injury   - Monitor for now    # Seizures vs myoclonic jerks   - Witness myoclonic jerking concerning for seizures with anoxic brain injury  - vEEG with abundant myoclonic seizures in the setting of a severe diffuse/multifocal cerebral dysfunction which can be seen in the setting of sedative effect or due to anoxia  - Continue on keppra   - Continue on valium 10 IV Q4H and switched to klonopin 2mg TID     CARDIOLOGY  # Shock state likely hemorrhagic vs vasoplegia  - Acute renal bleed noted requiring multiple transfusions and pressors in ICU.   - TTE 3/22 with EF 63 with normal LVRVSF with PASP 49  - Weaned off pressors in ICU and complicated by hypertension.   - Continue on norvasc 5 (started on 4/18)  - Monitor BP     # Autonomia vs pain  - Mild HTN and tachypnea noted likely pain induced vs autonomia?   - Continue on dilaudid pushes as needed     PULMONARY  # Respiratory failure   - Presented with SCC and hypoxia likely from anemia with no signs of acute chest, however noted with PEA with hemorrhagic shock post renal bx requiring intubation.   - Prolonged intubation and s/p 7 PORTEX trach on 4/4   - Continue on vent 20/460/6/30   - Continue on nebs.   - Continue on dilaudid PRN     GI  # Shock liver vs cirrhosis   - Transaminitis noted in ICU and thought to be second to shock liver likely second to hemorrhagic shock vs cardiac arrest, however LFTs remains elevated with acute on chronic hyperbilirubinemia.   - US ABD suggestive of early cirrhotic changes, cholecystectomy, and CBD 9mm   - Trend LFTs and bili   - Monitor scleral ictus    # GIB  - Anemia with GIB noted in ICU   - s/p EGD 4/11 with cauterization of gastric ulcer & hemostatic spraying of three duodenal ulcers. Gastric ulcer likely due to NGT trauma.   - Continue on PPI BID   - Monitor stools and HH     # Oropharyngeal dysphagia   - GI unable to place PEG due to hepatomegaly.   - IR unable to place PEG second to no safe window   - s/p open G TUBE placement on 4/18   - Continue on TF    RENAL:  # Acute renal failure and HAGMA second to shock state   - Scr on admission was 1.25 (3/15/25) and increased to 6s while in ICU and s/p HD 3/24-3/29 and then again on 4/4.   - L SHILE removed on 4/15   - CRE slowly improving but mild acidosis remains   - Continue on HCO3 650 TID tabs for now   - Continue with daniel for now  - Monitor renal function and UOP     # L renal mass bx c/b hematoma   - Renal bx as below on 3/19  - Hemorrhagic shock and PEA arrest on 3/20   - Renal US with large L renal subcapsular hematoma.    - IR left renal angiogram and embolization on 3/20     # Hypernatremia  - s/p D5W   - c/w  Q6H   - Monitor sodium     INFECTIOUS DISEASE   # VAP   - Post arrest noted with concern for aspiration and VAP in ICU   - Flu, SCx, BAL, BCx and UCx negative   - MRSA PCR with MSSA and completed bactroban in ICU   - Completed aztreonam (3/22-3/27) then casey (3/27-3/31) and vanco by dose in ICU   - CXR in ICU with RUL consolidation and completed recurrent casey course in ICU (4/2-4/7).   - Monitor off ABX    HEMATOLOGIC  # Anemia with SCC vs L kidney bleed vs GIB   - With above bleeds s/p 10U PRBC total while in ICU   - Monitor HH     # SCD   - Discontinued deferasirox due to current renal failure   - Monitor Sickle labs QD (CBC with Diff, Retic, BMP, Hepatic Function, LDH and Hapto, and VBG).     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   - Heparin GTT held for G TUBE and plan to resume tomorrow   - LUE precautions     # LUE arm infiltration   - HCO3 GTT infilitration in ICU   - Seen by hand sx and no compartment syndrome concern   - Monitor for now     # RUE blood infiltration   - RUE blood transfusion infiltration noted on 4/17 with area of ecchymosis   - Pending DOPPLER   - Monitor site for now    ENDOCRINE   # Glycemic control   - Monitor BG   - No hx of DM2     SKIN   - Wound care reccs appreciated    ETHICS   - FULL CODE     DISPO - TBD 43 YO M with PMHx of sickle cell disease with prior acute chest syndrome requiring transfusion and exchange in the past (last 12/2024), iron overload, gout, HTN, and RUE DVT in 12/2024 on eliquis who presented on 3/15 by his hematologist second to anemia and hypoxia, down to 5.3 from baseline 7-8, with concern for vaso-occlusive crisis. While on medicine, anemia improved post transfusion and continued on SCC pain control PCA. Patient noted with left renal mass during prior admission and s/p L ureteroscopy with biopsy and stent placement on 3/19. Course complicated with hypoglycemia on 3/20 with RRT x 2 and found with severe anemia to 3.4 with hemorrhagic shock second to left perirenal and subcapsular hemorrhage and ultimately PEA arrest. ROSC achieved and transferred to MICU. s/p IR emobilization and course complicated anoxic brain injury, myoclonic jerks, GIB, MATA, HAGMA, DVTs (R IJ and LUE brachial), dysphagia, cirrhosis, and prolonged vent time s/p trach on 4/4. Transferred to [facility] on 4/18.     NEUROLOGY  # Anoxic brain injury   - s/p cardiac arrest on 3/20 with ROSC in 4 minutes   - CT HEAD post arrest with diffuse sulcal effacement with increased intracranial pressure, and few patchy foci of cortical blurring concern for HIE  - MRI brain post arrest with with diffuse global abnormal supratentorial cortical restricted diffusion consistent with global hypoxic injury   - Monitor for now    # Seizures vs myoclonic jerks   - Witness myoclonic jerking concerning for seizures with anoxic brain injury  - vEEG with abundant myoclonic seizures in the setting of a severe diffuse/multifocal cerebral dysfunction which can be seen in the setting of sedative effect or due to anoxia  - Continue on keppra   - Continue on valium 10 IV Q4H and switched to klonopin 2mg TID     CARDIOLOGY  # Shock state likely hemorrhagic vs vasoplegia  - Acute renal bleed noted requiring multiple transfusions and pressors in ICU.   - TTE 3/22 with EF 63 with normal LVRVSF with PASP 49  - Weaned off pressors in ICU and complicated by hypertension.   - Continue on norvasc 5 (started on 4/18)  - Monitor BP     # Autonomia vs pain  - Mild HTN and tachypnea noted likely pain induced vs autonomia?   - Continue on dilaudid pushes as needed     PULMONARY  # Respiratory failure   - Presented with SCC and hypoxia likely from anemia with no signs of acute chest, however noted with PEA with hemorrhagic shock post renal bx requiring intubation.   - Prolonged intubation and s/p 7 PORTEX trach on 4/4   - Continue on vent 20/460/6/30   - Continue on nebs.   - Continue on dilaudid PRN     GI  # Shock liver vs cirrhosis   - Transaminitis noted in ICU and thought to be second to shock liver likely second to hemorrhagic shock vs cardiac arrest, however LFTs remains elevated with acute on chronic hyperbilirubinemia.   - US ABD suggestive of early cirrhotic changes, cholecystectomy, and CBD 9mm   - Trend LFTs and bili   - Monitor scleral ictus    # GIB  - Anemia with GIB noted in ICU   - s/p EGD 4/11 with cauterization of gastric ulcer & hemostatic spraying of three duodenal ulcers. Gastric ulcer likely due to NGT trauma.   - Continue on PPI BID   - Monitor stools and HH     # Oropharyngeal dysphagia   - GI unable to place PEG due to hepatomegaly.   - IR unable to place PEG second to no safe window   - s/p open G TUBE placement on 4/18   - Continue on TF    RENAL:  # Acute renal failure and HAGMA second to shock state   - Scr on admission was 1.25 (3/15/25) and increased to 6s while in ICU and s/p HD 3/24-3/29 and then again on 4/4.   - L SHILE removed on 4/15   - CRE slowly improving but mild acidosis remains   - Continue on HCO3 650 TID tabs for now   - Continue with daniel for now  - Monitor renal function and UOP     # L renal mass bx c/b hematoma   - Renal bx as below on 3/19  - Hemorrhagic shock and PEA arrest on 3/20   - Renal US with large L renal subcapsular hematoma.    - IR left renal angiogram and embolization on 3/20     # Hypernatremia  - s/p D5W   - c/w  Q6H   - Monitor sodium     INFECTIOUS DISEASE   # VAP   - Post arrest noted with concern for aspiration and VAP in ICU   - Flu, SCx, BAL, BCx and UCx negative   - MRSA PCR with MSSA and completed bactroban in ICU   - Completed aztreonam (3/22-3/27) then casey (3/27-3/31) and vanco by dose in ICU   - CXR in ICU with RUL consolidation and completed recurrent casey course in ICU (4/2-4/7).   - Monitor off ABX    HEMATOLOGIC  # Anemia with SCC vs L kidney bleed vs GIB   - With above bleeds s/p 10U PRBC total while in ICU   - Monitor HH     # SCD   - Discontinued deferasirox due to current renal failure   - Monitor Sickle labs QD (CBC with Diff, Retic, BMP, Hepatic Function, LDH and Hapto, and VBG).     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   - Heparin GTT held for G TUBE and plan to resume tomorrow   - LUE precautions     # LUE arm infiltration   - HCO3 GTT infilitration in ICU   - Seen by hand sx and no compartment syndrome concern   - Monitor for now     # RUE blood infiltration   - RUE blood transfusion infiltration noted on 4/17 with area of ecchymosis   - Pending DOPPLER   - Monitor site for now    ENDOCRINE   # Glycemic control   - Monitor BG   - No hx of DM2     SKIN   - Wound care reccs appreciated    ETHICS   - FULL CODE     DISPO - TBD  ************  4/19-H/H drop 43 YO M with PMHx of sickle cell disease with prior acute chest syndrome requiring transfusion and exchange in the past (last 12/2024), iron overload, gout, HTN, and RUE DVT in 12/2024 on eliquis who presented on 3/15 by his hematologist second to anemia and hypoxia, down to 5.3 from baseline 7-8, with concern for vaso-occlusive crisis. While on medicine, anemia improved post transfusion and continued on SCC pain control PCA. Patient noted with left renal mass during prior admission and s/p L ureteroscopy with biopsy and stent placement on 3/19. Course complicated with hypoglycemia on 3/20 with RRT x 2 and found with severe anemia to 3.4 with hemorrhagic shock second to left perirenal and subcapsular hemorrhage and ultimately PEA arrest. ROSC achieved and transferred to MICU. s/p IR emobilization and course complicated anoxic brain injury, myoclonic jerks, GIB, MATA, HAGMA, DVTs (R IJ and LUE brachial), dysphagia, cirrhosis, and prolonged vent time s/p trach on 4/4. Transferred to [facility] on 4/18.     NEUROLOGY  # Anoxic brain injury   - s/p cardiac arrest on 3/20 with ROSC in 4 minutes   - CT HEAD post arrest with diffuse sulcal effacement with increased intracranial pressure, and few patchy foci of cortical blurring concern for HIE  - MRI brain post arrest with with diffuse global abnormal supratentorial cortical restricted diffusion consistent with global hypoxic injury   - Monitor for now    # Seizures vs myoclonic jerks   - Witness myoclonic jerking concerning for seizures with anoxic brain injury  - vEEG with abundant myoclonic seizures in the setting of a severe diffuse/multifocal cerebral dysfunction which can be seen in the setting of sedative effect or due to anoxia  - Continue on keppra   - Continue on valium 10 IV Q4H and switched to klonopin 2mg TID   - Continue on dilaudid PRN     CARDIOLOGY  # Shock state likely hemorrhagic vs vasoplegia  - Acute renal bleed noted requiring multiple transfusions and pressors in ICU.   - TTE 3/22 with EF 63 with normal LVRVSF with PASP 49  - Weaned off pressors in ICU and complicated by hypertension.   - Started on norvasc 5, however dc'ed as BP improved on opioids   - Monitor BP     # Autonomia vs pain  - Mild HTN and tachypnea noted likely pain induced vs autonomia?   - Continue on dilaudid pushes as needed     PULMONARY  # Respiratory failure   - Presented with SCC and hypoxia likely from anemia with no signs of acute chest, however noted with PEA with hemorrhagic shock post renal bx requiring intubation.   - Prolonged intubation and s/p 7 PORTEX trach on 4/4   - Continue on vent 20/460/6/30   - Continue on nebs.   - Continue on dilaudid PRN     GI  # Shock liver vs cirrhosis   - Transaminitis noted in ICU and thought to be second to shock liver likely second to hemorrhagic shock vs cardiac arrest, however LFTs remains elevated with acute on chronic hyperbilirubinemia.   - US ABD suggestive of early cirrhotic changes, cholecystectomy, and CBD 9mm   - Trend LFTs and bili   - Monitor scleral ictus    # GIB  - Anemia with GIB noted in ICU   - s/p EGD 4/11 with cauterization of gastric ulcer & hemostatic spraying of three duodenal ulcers. Gastric ulcer likely due to NGT trauma.   - Continue on PPI BID   - Monitor stools and HH     # Oropharyngeal dysphagia   - GI unable to place PEG due to hepatomegaly.   - IR unable to place PEG second to no safe window   - s/p open G TUBE placement on 4/18   - Continue on TF    RENAL:  # Acute renal failure and HAGMA second to shock state   - Scr on admission was 1.25 (3/15/25) and increased to 6s while in ICU and s/p HD 3/24-3/29 and then again on 4/4.   - L SHILE removed on 4/15   - CRE slowly improving but mild acidosis remains   - Continue on HCO3 1300 TID tabs for now   - Continue with daniel for now  - Monitor renal function, acidosis, and UOP     # L renal mass bx c/b hematoma   - Renal bx as below on 3/19  - Hemorrhagic shock and PEA arrest on 3/20   - Renal US with large L renal subcapsular hematoma.    - IR left renal angiogram and embolization on 3/20     # Hypernatremia  - s/p D5W   - c/w  Q6H   - Monitor sodium     INFECTIOUS DISEASE   # VAP   - Post arrest noted with concern for aspiration and VAP in ICU   - Flu, SCx, BAL, BCx and UCx negative   - MRSA PCR with MSSA and completed bactroban in ICU   - Completed aztreonam (3/22-3/27) then casey (3/27-3/31) and vanco by dose in ICU   - CXR in ICU with RUL consolidation and completed recurrent casey course in ICU (4/2-4/7).   - Monitor off ABX    HEMATOLOGIC  # Anemia with SCC vs L kidney bleed vs GIB   - With above bleeds s/p 10U PRBC total while in ICU   - HH dropped post op, however no acute signs of bleeding from surgical site or sickling.   - Anemia likely from dilution with bolus given overnight.   - Monitor HH     # SCD   - Discontinued deferasirox due to current renal failure   - Monitor Sickle labs QD (CBC with Diff, Retic, BMP, Hepatic Function, LDH and Hapto, and VBG).     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   - Heparin GTT held for G TUBE and plan to resume when able.   - LUE precautions     # LUE arm infiltration   - HCO3 GTT infilitration in ICU   - Seen by hand sx and no compartment syndrome concern   - Monitor for now     # RUE blood infiltration   - RUE blood transfusion infiltration noted on 4/17 with area of ecchymosis   - Pending DOPPLER   - Monitor site for now    ENDOCRINE   # Glycemic control   - Monitor BG   - No hx of DM2     SKIN   - Wound care reccs appreciated    ETHICS   - FULL CODE     DISPO - TBD  ************  4/19-H/H drop 43 YO M with PMHx of sickle cell disease with prior acute chest syndrome requiring transfusion and exchange in the past (last 12/2024), iron overload, gout, HTN, and RUE DVT in 12/2024 on eliquis who presented on 3/15 by his hematologist second to anemia and hypoxia, down to 5.3 from baseline 7-8, with concern for vaso-occlusive crisis. While on medicine, anemia improved post transfusion and continued on SCC pain control PCA. Patient noted with left renal mass during prior admission and s/p L ureteroscopy with biopsy and stent placement on 3/19. Course complicated with hypoglycemia on 3/20 with RRT x 2 and found with severe anemia to 3.4 with hemorrhagic shock second to left perirenal and subcapsular hemorrhage and ultimately PEA arrest. ROSC achieved and transferred to MICU. s/p IR emobilization and course complicated anoxic brain injury, myoclonic jerks, GIB, MATA, HAGMA, DVTs (R IJ and LUE brachial), dysphagia, cirrhosis, and prolonged vent time s/p trach on 4/4. Transferred to [facility] on 4/18.     NEUROLOGY  # Anoxic brain injury   - s/p cardiac arrest on 3/20 with ROSC in 4 minutes   - CT HEAD post arrest with diffuse sulcal effacement with increased intracranial pressure, and few patchy foci of cortical blurring concern for HIE  - MRI brain post arrest with with diffuse global abnormal supratentorial cortical restricted diffusion consistent with global hypoxic injury   - Monitor for now    # Seizures vs myoclonic jerks   - Witness myoclonic jerking concerning for seizures with anoxic brain injury  - vEEG with abundant myoclonic seizures in the setting of a severe diffuse/multifocal cerebral dysfunction which can be seen in the setting of sedative effect or due to anoxia  - Continue on keppra   - Continue on valium 10 IV Q4H and switched to klonopin 2mg TID   - Continue on dilaudid PRN     CARDIOLOGY  # Shock state likely hemorrhagic vs vasoplegia  - Acute renal bleed noted requiring multiple transfusions and pressors in ICU.   - TTE 3/22 with EF 63 with normal LVRVSF with PASP 49  - Weaned off pressors in ICU and complicated by hypertension.   - Started on norvasc 5, however dc'ed as BP improved on opioids   - Monitor BP     # Autonomia vs pain  - Mild HTN and tachypnea noted likely pain induced vs autonomia?   - Continue on dilaudid pushes as needed     PULMONARY  # Respiratory failure   - Presented with SCC and hypoxia likely from anemia with no signs of acute chest, however noted with PEA with hemorrhagic shock post renal bx requiring intubation.   - Prolonged intubation and s/p 7 PORTEX trach on 4/4   - Continue on vent 20/460/6/30   - Continue on nebs.   - Continue on dilaudid PRN     GI  # Shock liver vs cirrhosis   - Transaminitis noted in ICU and thought to be second to shock liver likely second to hemorrhagic shock vs cardiac arrest, however LFTs remains elevated with acute on chronic hyperbilirubinemia.   - US ABD suggestive of early cirrhotic changes, cholecystectomy, and CBD 9mm   - Trend LFTs and bili   - Monitor scleral ictus    # GIB  - Anemia with GIB noted in ICU   - s/p EGD 4/11 with cauterization of gastric ulcer & hemostatic spraying of three duodenal ulcers. Gastric ulcer likely due to NGT trauma.   - Continue on PPI BID   - Monitor stools and HH     # Oropharyngeal dysphagia   - GI unable to place PEG due to hepatomegaly.   - IR unable to place PEG second to no safe window   - s/p open G TUBE placement on 4/18   - Continue on TF    RENAL:  # Acute renal failure and HAGMA second to shock state   - Scr on admission was 1.25 (3/15/25) and increased to 6s while in ICU and s/p HD 3/24-3/29 and then again on 4/4.   - L SHILE removed on 4/15   - CRE slowly improving but mild acidosis remains   - Continue on HCO3 1300 TID tabs for now   - Continue with daniel for now  - Monitor renal function, acidosis, and UOP     # L renal mass bx c/b hematoma   - Renal bx as below on 3/19  - Hemorrhagic shock and PEA arrest on 3/20   - Renal US with large L renal subcapsular hematoma.    - IR left renal angiogram and embolization on 3/20     # Hypernatremia  - s/p D5W   - c/w  Q6H   - Monitor sodium     INFECTIOUS DISEASE   # VAP   - Post arrest noted with concern for aspiration and VAP in ICU   - Flu, SCx, BAL, BCx and UCx negative   - MRSA PCR with MSSA and completed bactroban in ICU   - Completed aztreonam (3/22-3/27) then casey (3/27-3/31) and vanco by dose in ICU   - CXR in ICU with RUL consolidation and completed recurrent casey course in ICU (4/2-4/7).   - Monitor off ABX    HEMATOLOGIC  # Anemia with SCC vs L kidney bleed vs GIB   - Baseline hemoglobin outpatient ~6.0 and sent in for anemia down to 5.3 without signs of bleeding and more likely SCC.    - Transfusion given on admission and improved back to baseline.   - Course complicated by hemorrhagic shock from left renal hematoma and HH down to 3.4.  - s/p 10U PRBC total while in ICU   - s/p PRBC pre-GTUBE with HH increased to 8.0.   - HH dropping post op down to 7.0, however no acute signs of bleeding from surgical site or and/ or signs of sickling.   - HH more likely trending back to baseline.   - Monitor HH     # SCD   - Discontinued deferasirox due to current renal failure   - Monitor Sickle labs QD (CBC with Diff, Retic, BMP, Hepatic Function, LDH and Hapto, and VBG).     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   - Heparin GTT held for G TUBE  - Hold resuming and monitor HH  - LUE precautions     # LUE arm infiltration   - HCO3 GTT infilitration in ICU   - Seen by hand sx and no compartment syndrome concern   - Monitor for now     # RUE blood infiltration   - RUE blood transfusion infiltration noted on 4/17 with area of ecchymosis   - RPT DOPPLER with known R IJ DVT, however no upper arm thrombus or issues in area of infiltration  - Monitor site for now    ENDOCRINE   # Glycemic control   - Monitor BG   - No hx of DM2     SKIN   - Wound care reccs appreciated    ETHICS   - FULL CODE     DISPO - TBD  ************  4/19-H/H drop

## 2025-04-19 NOTE — PROGRESS NOTE ADULT - SUBJECTIVE AND OBJECTIVE BOX
RCU PROGRESS NOTE     CHIEF COMPLAINT: Patient is a 44y old  Male who presents with a chief complaint of Referred by Hematologist for low Hg (2025 13:54)      INTERVAL EVENTS:  - Tachycardia overnight and improves with dilaudid doses     REVIEW OF SYSTEMS: Seen by bedside during AM rounds and unable to assess ROS as HIE    Mode: AC/ CMV (Assist Control/ Continuous Mandatory Ventilation), RR (machine): 20, TV (machine): 460, FiO2: 30, PEEP: 6, ITime: 0.65, MAP: 11, PIP: 26      OBJECTIVE:  ICU Vital Signs Last 24 Hrs  T(C): 36.4 (2025 04:00), Max: 37.5 (2025 12:00)  T(F): 97.6 (2025 04:00), Max: 99.5 (2025 12:00)  HR: 83 (2025 07:35) (67 - 101)  BP: 156/89 (2025 05:40) (141/86 - 164/93)  BP(mean): 107 (2025 16:00) (102 - 113)  ABP: --  ABP(mean): --  RR: 18 (2025 04:00) (16 - 21)  SpO2: 100% (2025 07:35) (98% - 100%)    O2 Parameters below as of 2025 04:00  Patient On (Oxygen Delivery Method): ventilator    O2 Concentration (%): 30      Mode: AC/ CMV (Assist Control/ Continuous Mandatory Ventilation), RR (machine): 20, TV (machine): 460, FiO2: 30, PEEP: 6, ITime: 0.65, MAP: 11, PIP: 26    04-17 @ 07:01  -  -18 @ 07:00  --------------------------------------------------------  IN: 621 mL / OUT: 1825 mL / NET: -1204 mL      CAPILLARY BLOOD GLUCOSE  POCT Blood Glucose.: 118 mg/dL (2025 00:26)      HOSPITAL MEDICATIONS:  MEDICATIONS  (STANDING):  albuterol/ipratropium for Nebulization 3 milliLiter(s) Nebulizer every 6 hours  chlorhexidine 2% Cloths 1 Application(s) Topical daily  dextrose 5%. 1000 milliLiter(s) (75 mL/Hr) IV Continuous <Continuous>  diazepam  Injectable 10 milliGRAM(s) IV Push every 4 hours  influenza   Vaccine 0.5 milliLiter(s) IntraMuscular once  levETIRAcetam   Injectable 500 milliGRAM(s) IV Push every 12 hours  pantoprazole  Injectable 40 milliGRAM(s) IV Push every 12 hours  potassium chloride  10 mEq/100 mL IVPB 10 milliEquivalent(s) IV Intermittent every 1 hour    MEDICATIONS  (PRN):      PHYSICAL EXAMINATION  General: NAD   HEENT: Scleral icterus and trach present  Cards: S1/S2, no murmurs   Pulm: Course vent sounds bilaterally. No wheezes.   Abdomen: Soft, nondistended and nontender.   Extremities: No pedal edema. No active SABINO of BL upper and lower extremities. Bicep activation noted with noxious stimuli.   Neurology: Eyes open intermittently but does not follow commands with HIE with no acute focal neurological deficits     LABS:                        8.0    19.25 )-----------( 335      ( 2025 04:43 )             24.1                             6.9    21.19 )-----------( 290      ( 2025 06:00 )             20.8       04-18    148[H]  |  115[H]  |  52[H]  ----------------------------<  111[H]  3.4[L]   |  18[L]  |  2.22[H]    Ca    9.1      2025 04:43  Phos  4.2     04-18  Mg     2.10     04-18      04-19    144  |  114[H]  |  55[H]  ----------------------------<  135[H]  3.9   |  18[L]  |  2.33[H]    Ca    8.6      2025 06:00  Phos  5.0     04-19  Mg     1.90     04-19    TPro  6.9  /  Alb  2.6[L]  /  TBili  5.6[H]  /  DBili  4.5[H]  /  AST  93[H]  /  ALT  42[H]  /  AlkPhos  192[H]  04-19        TPro  7.9  /  Alb  2.9[L]  /  TBili  6.3[H]  /  DBili  x   /  AST  105[H]  /  ALT  43[H]  /  AlkPhos  159[H]  04-18    PT/INR - ( 2025 04:43 )   PT: 21.2 sec;   INR: 1.79 ratio       PTT - ( 2025 04:43 )  PTT:31.2 sec    Urinalysis Basic - ( 2025 04:43 )  Color: x / Appearance: x / SG: x / pH: x  Gluc: 111 mg/dL / Ketone: x  / Bili: x / Urobili: x   Blood: x / Protein: x / Nitrite: x   Leuk Esterase: x / RBC: x / WBC x   Sq Epi: x / Non Sq Epi: x / Bacteria: x        Venous Blood Gas:   @ 00:40  7.43/31/132/21/98.9  VBG Lactate: 1.1      PAST MEDICAL & SURGICAL HISTORY:  Sickle cell anemia      Gout      Deep vein thrombosis (DVT)      Iron overload      Hypertension      History of cholecystectomy      History of removal of Port-a-Cath          FAMILY HISTORY:  Family history of sickle cell trait (Father, Mother)    Patient's father is  (Father)    Patient's mother is  (Mother)        Social History:  Lives alone (15 Mar 2025 09:54)      RADIOLOGY:  [ ] Reviewed and interpreted by me    PULMONARY FUNCTION TESTS:    EKG: RCU PROGRESS NOTE     CHIEF COMPLAINT: Patient is a 44y old  Male who presents with a chief complaint of Referred by Hematologist for low Hg (2025 13:54)      INTERVAL EVENTS:  - Tachycardia overnight and improves with dilaudid doses   - Anemic this morning however surgical site without bleeding and sickle labs without evidence of acute hemolysis/ sickling.     REVIEW OF SYSTEMS: Seen by bedside during AM rounds and unable to assess ROS as HIE    Mode: AC/ CMV (Assist Control/ Continuous Mandatory Ventilation), RR (machine): 20, TV (machine): 460, FiO2: 30, PEEP: 6, ITime: 0.65, MAP: 11, PIP: 26      OBJECTIVE:  ICU Vital Signs Last 24 Hrs  T(C): 36.4 (2025 04:00), Max: 37.5 (2025 12:00)  T(F): 97.6 (2025 04:00), Max: 99.5 (2025 12:00)  HR: 83 (2025 07:35) (67 - 101)  BP: 156/89 (2025 05:40) (141/86 - 164/93)  BP(mean): 107 (2025 16:00) (102 - 113)  ABP: --  ABP(mean): --  RR: 18 (2025 04:00) (16 - 21)  SpO2: 100% (2025 07:35) (98% - 100%)    O2 Parameters below as of 2025 04:00  Patient On (Oxygen Delivery Method): ventilator    O2 Concentration (%): 30      Mode: AC/ CMV (Assist Control/ Continuous Mandatory Ventilation), RR (machine): 20, TV (machine): 460, FiO2: 30, PEEP: 6, ITime: 0.65, MAP: 11, PIP: 26    04-17 @ 07:01  -  04-18 @ 07:00  --------------------------------------------------------  IN: 621 mL / OUT: 1825 mL / NET: -1204 mL      CAPILLARY BLOOD GLUCOSE  POCT Blood Glucose.: 118 mg/dL (2025 00:26)      HOSPITAL MEDICATIONS:  MEDICATIONS  (STANDING):  albuterol/ipratropium for Nebulization 3 milliLiter(s) Nebulizer every 6 hours  chlorhexidine 2% Cloths 1 Application(s) Topical daily  dextrose 5%. 1000 milliLiter(s) (75 mL/Hr) IV Continuous <Continuous>  diazepam  Injectable 10 milliGRAM(s) IV Push every 4 hours  influenza   Vaccine 0.5 milliLiter(s) IntraMuscular once  levETIRAcetam   Injectable 500 milliGRAM(s) IV Push every 12 hours  pantoprazole  Injectable 40 milliGRAM(s) IV Push every 12 hours  potassium chloride  10 mEq/100 mL IVPB 10 milliEquivalent(s) IV Intermittent every 1 hour    MEDICATIONS  (PRN):      PHYSICAL EXAMINATION  General: NAD   HEENT: Scleral icterus and trach present  Cards: S1/S2, no murmurs   Pulm: Course vent sounds bilaterally. No wheezes.   Abdomen: Soft, nondistended and nontender.   Extremities: No pedal edema. No active SABINO of BL upper and lower extremities. Bicep activation noted with noxious stimuli.   Neurology: Eyes open intermittently but does not follow commands with HIE with no acute focal neurological deficits     LABS:                        8.0    19.25 )-----------( 335      ( 2025 04:43 )             24.1                             6.9    21.19 )-----------( 290      ( 2025 06:00 )             20.8       04-18    148[H]  |  115[H]  |  52[H]  ----------------------------<  111[H]  3.4[L]   |  18[L]  |  2.22[H]    Ca    9.1      2025 04:43  Phos  4.2     04-18  Mg     2.10     04-18      04-19    144  |  114[H]  |  55[H]  ----------------------------<  135[H]  3.9   |  18[L]  |  2.33[H]    Ca    8.6      2025 06:00  Phos  5.0     04-19  Mg     1.90     04-19    TPro  6.9  /  Alb  2.6[L]  /  TBili  5.6[H]  /  DBili  4.5[H]  /  AST  93[H]  /  ALT  42[H]  /  AlkPhos  192[H]          TPro  7.9  /  Alb  2.9[L]  /  TBili  6.3[H]  /  DBili  x   /  AST  105[H]  /  ALT  43[H]  /  AlkPhos  159[H]      PT/INR - ( 2025 04:43 )   PT: 21.2 sec;   INR: 1.79 ratio       PTT - ( 2025 04:43 )  PTT:31.2 sec    Urinalysis Basic - ( 2025 04:43 )  Color: x / Appearance: x / SG: x / pH: x  Gluc: 111 mg/dL / Ketone: x  / Bili: x / Urobili: x   Blood: x / Protein: x / Nitrite: x   Leuk Esterase: x / RBC: x / WBC x   Sq Epi: x / Non Sq Epi: x / Bacteria: x        Venous Blood Gas:   @ 00:40  7.43/31/132/21/98.9  VBG Lactate: 1.1      PAST MEDICAL & SURGICAL HISTORY:  Sickle cell anemia      Gout      Deep vein thrombosis (DVT)      Iron overload      Hypertension      History of cholecystectomy      History of removal of Port-a-Cath          FAMILY HISTORY:  Family history of sickle cell trait (Father, Mother)    Patient's father is  (Father)    Patient's mother is  (Mother)        Social History:  Lives alone (15 Mar 2025 09:54)      RADIOLOGY:  [ ] Reviewed and interpreted by me    PULMONARY FUNCTION TESTS:    EKG:

## 2025-04-19 NOTE — PROGRESS NOTE ADULT - SUBJECTIVE AND OBJECTIVE BOX
SURGERY DAILY PROGRESS NOTE:     Overnight Events:  tube feeds started, patient reportedly tolerating    SUBJECTIVE:   Patient seen and evaluated on AM rounds.   subjective exam limited by patients clinical state    OBJECTIVE:  Vital Signs Last 24 Hrs  T(C): 37.2 (19 Apr 2025 04:00), Max: 37.4 (18 Apr 2025 20:00)  T(F): 98.9 (19 Apr 2025 04:00), Max: 99.4 (18 Apr 2025 20:00)  HR: 112 (19 Apr 2025 07:29) (98 - 130)  BP: 116/57 (19 Apr 2025 04:00) (116/57 - 166/109)  BP(mean): 108 (18 Apr 2025 12:00) (108 - 124)  RR: 18 (19 Apr 2025 04:00) (16 - 31)  SpO2: 98% (19 Apr 2025 07:29) (96% - 100%)    Parameters below as of 19 Apr 2025 04:00  Patient On (Oxygen Delivery Method): ventilator    O2 Concentration (%): 30  I&O's Detail    18 Apr 2025 07:01  -  19 Apr 2025 07:00  --------------------------------------------------------  IN:  Total IN: 0 mL    OUT:    Indwelling Catheter - Urethral (mL): 125 mL    Indwelling Catheter - Urethral (mL): 500 mL  Total OUT: 625 mL    Total NET: -625 mL        Daily     Daily     LABS:                        6.9    21.19 )-----------( 290      ( 19 Apr 2025 06:00 )             20.8     04-19    144  |  114[H]  |  55[H]  ----------------------------<  135[H]  3.9   |  18[L]  |  2.33[H]    Ca    8.6      19 Apr 2025 06:00  Phos  5.0     04-19  Mg     1.90     04-19    TPro  6.9  /  Alb  2.6[L]  /  TBili  5.6[H]  /  DBili  4.5[H]  /  AST  93[H]  /  ALT  42[H]  /  AlkPhos  192[H]  04-19    PT/INR - ( 18 Apr 2025 04:43 )   PT: 21.2 sec;   INR: 1.79 ratio         PTT - ( 18 Apr 2025 04:43 )  PTT:31.2 sec  Urinalysis Basic - ( 19 Apr 2025 06:00 )    Color: x / Appearance: x / SG: x / pH: x  Gluc: 135 mg/dL / Ketone: x  / Bili: x / Urobili: x   Blood: x / Protein: x / Nitrite: x   Leuk Esterase: x / RBC: x / WBC x   Sq Epi: x / Non Sq Epi: x / Bacteria: x        PHYSICAL EXAM:  Constitutional: NAD  Pulmonary: ventilated  Gastrointestinal: Abdomen soft, nontender - no facial grimace to palpation, nondistended. No rebound or guarding. No obvious masses. Midline incisions with dressing c/d/i w minimal strikethrough. g tube intact with protective padding under neath 5.5 cm at the bumper

## 2025-04-19 NOTE — PROGRESS NOTE ADULT - ASSESSMENT
+44M with history of sickle cell disease with multiple crises, urothelial ca, HTN, RUE DVT on Eliquis (held since 3/15), admitted for anemia on outpatient blood work. Underwent L ureteroscopy, L kidney mass biopsy, and stent placement 3/19/25 c/b perinephric hemorrhagic shock with PEA arrest, ROSC after 4 minutes however unclear anoxic time; s/p IR embolization of left renal artery 3/20.  EGD performed by GI for persistent melena on 4/11/25 where a gastric ulcer was cauterized and hemostatic agent was sprayed on 3x duodenal ulcers.  GOC discussion with family on 4/16/25 where family discussed wanting to establish feeding access for the patient for which surgical team was reconsulted.  Per GI and IR no safe window for percutaneous/endoscopic placement due to hepatomegaly.   NOw s/p stramm gastrostomy tube placement 4/18    Plan:  - c/w tube feeds as tolerated  - resume Heparin gtt in AM per laisha yteam  - global care per primary team appreciated  - surgery to sign off at this time, call back if issues arise    B team [id number].

## 2025-04-19 NOTE — PROGRESS NOTE ADULT - NS ATTEND AMEND GEN_ALL_CORE FT
"  Assessment & Plan     Acquired hypothyroidism  Will check TSH today , has been on the synthroid 50 mcg daily   - REVIEW OF HEALTH MAINTENANCE PROTOCOL ORDERS    Mood disorder in conditions classified elsewhere  Sees psychiatry but has not seen a therapist for a while , so I gave him a referral for starting therapy again  Or he can ask his psychiatrist for that too   - Adult Mental Health  Referral; Future    Elevated fasting glucose  Will screen   - Hemoglobin A1c    Anxiety  As above , well controlled currently     Encounter for immunization  Got his tetanus shot today   - TDAP VACCINE (Adacel, Boostrix)  [[id number]]    Screening for hypercholesterolemia  Will check lipids as he is fasting today   - Lipid panel reflex to direct LDL Fasting    Encounter for long-term (current) use of medications  He needs levels of lithium per psychiatry and also other labs as per below   - Lithium Level  - Comprehensive Metabolic Panel  - CBC with Platelets Differential ([facility])  - TSH with free T4 reflex       BMI:   Estimated body mass index is 29.67 kg/m  as calculated from the following:    Height as of 5/25/21: 1.74 m (5' 8.5\").    Weight as of this encounter: 89.8 kg (198 lb).           No follow-ups on file.    Tatiana Juan MD  [facility]    Edin Elder is a 29 year old who presents for the following health issues     HPI     Medication Followup of lithium ER (LITHOBID) 300 MG CR tablet    Taking Medication as prescribed: yes    Side Effects:  None    Medication Helping Symptoms:  yes   Years and years tremor in hands , has had it for many years since child hawkins , not worse   He is on lithium per psychiatry for mood disorder and needs periodic checks , here for labs , otherwise is doing well on the current meds , lithium, Wellbutrin 300 mg and abilify 10 mg daily , Zyprexa 2.5 mg at bedtime   2) hypothyroidism on synthroid 50 mcg   3) GERD has been taking omeprazole 20 mg daily, doing " well on this , no side effects  He is living with girlfriend who is pregnant and they are expecting their first child in June       Review of Systems   Constitutional, HEENT, cardiovascular, pulmonary, GI, , musculoskeletal, neuro, skin, endocrine and psych systems are negative, except as otherwise noted.      Objective    There were no vitals taken for this visit.  There is no height or weight on file to calculate BMI.  Physical Exam   GENERAL: healthy, alert and no distress  EYES: Eyes grossly normal to inspection, PERRL and conjunctivae and sclerae normal  NECK: no adenopathy, no asymmetry, masses, or scars and thyroid normal to palpation  RESP: lungs clear to auscultation - no rales, rhonchi or wheezes  CV: regular rate and rhythm, normal S1 S2, no S3 or S4, no murmur, click or rub, no peripheral edema and peripheral pulses strong  ABDOMEN: soft, nontender, no hepatosplenomegaly, no masses and bowel sounds normal  MS: no gross musculoskeletal defects noted, no edema  NEURO: Normal strength and tone, mentation intact and speech normal    Results for orders placed or performed in visit on 04/01/22 (from the past 24 hour(s))   Hemoglobin A1c   Result Value Ref Range    Hemoglobin A1C 5.3 0.0 - 5.6 %   Lithium Level   Result Value Ref Range    Lithium 0.5   mmol/L   CBC with Platelets Differential ([facility])    Narrative    The following orders were created for panel order CBC with Platelets Differential ([facility]).  Procedure                               Abnormality         Status                     ---------                               -----------         ------                     CBC with platelets and d...[[id number]]  Abnormal            Final result                 Please view results for these tests on the individual orders.   CBC with platelets and differential   Result Value Ref Range    WBC Count 5.8 4.0 - 11.0 10e3/uL    RBC Count 5.09 4.40 - 5.90 10e6/uL    Hemoglobin 13.6 13.3 - 17.7 g/dL     Hematocrit 42.6 40.0 - 53.0 %    MCV 84 78 - 100 fL    MCH 26.7 26.5 - 33.0 pg    MCHC 31.9 31.5 - 36.5 g/dL    RDW 15.1 (H) 10.0 - 15.0 %    Platelet Count 161 150 - 450 10e3/uL    % Neutrophils 34 %    % Lymphocytes 49 %    % Monocytes 9 %    % Eosinophils 6 %    % Basophils 1 %    Absolute Neutrophils 2.0 1.6 - 8.3 10e3/uL    Absolute Lymphocytes 2.9 0.8 - 5.3 10e3/uL    Absolute Monocytes 0.6 0.0 - 1.3 10e3/uL    Absolute Eosinophils 0.3 0.0 - 0.7 10e3/uL    Absolute Basophils 0.1 0.0 - 0.2 10e3/uL                44y Male with PMH of  Sickle cell disease c/b hx of ACS, DVT, presented with low Hg (5/3) s/p bladder biopsy complicated by hemorrhagic shock, course further complicated by cardiac arrest with multiorgan failure and anoxic brain injury. Pt transferred overnight from MICU.      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   - myoclonus - now resolved/controlled   - continue  Keppra  - continue valium 10 IV q4h - wean off as tolerated, will transition to po benzodiazepine if tolerated   - will add medication for pain control - likely oxycodone 10 mg q6h   Respiratory - AHRF s/p trach  - continue PS trials, PC if not tolerated  - continue IPV and trach care  Heme - Sickle cell, hemorrhagic shock secondary to biopsy (resolved), R IJ and L brachial DVTs - not on AC due to GIB and hx of hemorrhagic   - no clear signs of sickle crisis currently   - c/tm goal  > 6  Onc - 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   - pantoprazole 40 mg IV bid  ID - off antibiotics at this time   GOC - full code at this time, palliative care has seen patient, signed off as goals are clear. Decision makers are brother and sister, has a large involved family.     rest of plan as above as above:  44y Male with PMH of  Sickle cell disease c/b hx of ACS, DVT, presented with low Hg (5/3) s/p bladder biopsy complicated by hemorrhagic shock, course further complicated by cardiac arrest with multiorgan failure and anoxic brain injury. Pt transferred overnight from MICU.      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     - pantoprazole 40 mg IV bid  ID - off antibiotics at this time   GOC - full code at this time, palliative care has seen patient, signed off as goals are clear. Decision makers are brother and sister, has a large involved family.   Matt Quick MD-Pulmonary   [phone]      rest of plan as above

## 2025-04-19 NOTE — PROGRESS NOTE ADULT - NUTRITIONAL ASSESSMENT
This patient has been assessed with a concern for Malnutrition and has been determined to have a diagnosis/diagnoses of Severe protein-calorie malnutrition.    This patient is being managed with:   Diet NPO with Tube Feed-  Tube Feeding Modality: Gastrostomy  Nepro with Carb Steady (NEPRORTH)  Total Volume for 24 Hours (mL): 720  Continuous  Starting Tube Feed Rate {mL per Hour}: 40  Until Goal Tube Feed Rate (mL per Hour): 40  Tube Feed Duration (in Hours): 18  Tube Feed Start Time: 11:00  Tube Feed Stop Time: 05:00  Liquid Protein Supplement     Qty per Day:  4  Entered: Apr 18 2025  2:43PM

## 2025-04-19 NOTE — CHART NOTE - NSCHARTNOTEFT_GEN_A_CORE
RCU PA NOTE     Called by RN for patient with tachycardia to 130 and ST on monitor and tachypnea to 35. Seen by bedside and noted with increased WOB on vent. PIP 30s. Suctioned with no secretions. Auscultation clear bilaterally. No fever spikes noted. Urine dark yellow, however sodium improved and no improvement in tachycardia post bolus overnight. HH dropped, however acute signs of bleeding. Will dose dilaudid and RPT CBC. Monitor for now.     DEMOND Pickens, PA-C  Department of Medicine/ RCU  In house RCU [facility] [id number]  In house Medicine [facility] [id number]  Reachable via teams RCU PA NOTE     Called by RN for patient with tachycardia to 130 and ST on monitor and tachypnea to 35. Seen by bedside and noted with increased WOB on vent. PIP 30s. Suctioned with no secretions. Auscultation clear bilaterally. No fever spikes noted. Urine dark yellow, however sodium improved and no improvement in tachycardia post bolus overnight. HH dropped, however no acute signs of bleeding or sickling (retic, bili, hapto and LDH improved from prior) and per chart review baseline hemoglobin roughly 6 on outpatient. Drop in hemoglobin likely returning to baseline post transfusion. Dosed dilaudid 0.5mg IVP and improvement in respiratory status noted, however tachycardia remains 120s. LR 1L bolus given with no improvement. BP stable despite ST and will monitor for now.     May Tan MPAS, PA-C  Department of Medicine/ RCU  In house RCU [facility] [id number]  In house Medicine [facility] [id number]  Reachable via teams RCU PA NOTE     Called by RN for patient with tachycardia to 130 and ST on monitor and tachypnea to 35. Seen by bedside and noted with increased WOB on vent. PIP 30s. Suctioned with no secretions. Auscultation clear bilaterally. No fever spikes noted. Urine dark yellow, however sodium improved and no improvement in tachycardia post bolus overnight. HH dropped, however no acute signs of bleeding at surgical wound, lactate normal, and no concern for acute sickling (retic, bili, hapto and LDH improved from prior). Per chart review baseline hemoglobin roughly 6 on outpatient. Drop in hemoglobin likely returning to baseline post transfusion. Dosed dilaudid 0.5mg IVP and improvement in respiratory status noted, however tachycardia remains 120s. LR 1L bolus given with no improvement. BP stable despite ST and will monitor for now.     DEMOND Pickens, PA-C  Department of Medicine/ RCU  In house RCU [place] [id number]  In house Medicine [facility] [id number]  Reachable via teams RCU PA NOTE     Called by RN for patient with tachycardia to 130 and ST on monitor and tachypnea to 35. Seen by bedside and noted with increased WOB on vent. PIP 30s. Suctioned with no secretions. Auscultation clear bilaterally. No fever spikes noted. Urine dark yellow, however sodium improved and no improvement in tachycardia post bolus overnight. Dark urine more likely hyperbilirubinemia rather than dehydration. HH dropped, however no acute signs of bleeding at surgical wound, lactate normal, and no concern for acute sickling (retic, bili, hapto and LDH improved from prior). Per chart review baseline hemoglobin roughly 6 on outpatient. Drop in hemoglobin likely returning to baseline post transfusion. Dosed dilaudid 0.5mg IVP and improvement in respiratory status noted, however tachycardia remains 120s. LR 1L bolus given with no improvement. BP stable despite ST and will monitor for now.     May Tan, CECILIAS, PA-C  Department of Medicine/ RCU  In house RCU [facility] [id number]  In house Medicine [facility] [id number]  Reachable via teams

## 2025-04-20 LAB
ADD ON TEST-SPECIMEN IN LAB: SIGNIFICANT CHANGE UP
ADD ON TEST-SPECIMEN IN LAB: SIGNIFICANT CHANGE UP
ALBUMIN SERPL ELPH-MCNC: 2.5 G/DL — LOW (ref 3.3–5)
ALBUMIN SERPL ELPH-MCNC: 2.5 G/DL — LOW (ref 3.3–5)
ALP SERPL-CCNC: 213 U/L — HIGH (ref 40–120)
ALP SERPL-CCNC: 226 U/L — HIGH (ref 40–120)
ALT FLD-CCNC: 33 U/L — SIGNIFICANT CHANGE UP (ref 4–41)
ALT FLD-CCNC: 39 U/L — SIGNIFICANT CHANGE UP (ref 4–41)
ANION GAP SERPL CALC-SCNC: 12 MMOL/L — SIGNIFICANT CHANGE UP (ref 7–14)
ANION GAP SERPL CALC-SCNC: 13 MMOL/L — SIGNIFICANT CHANGE UP (ref 7–14)
APPEARANCE UR: CLEAR — SIGNIFICANT CHANGE UP
APPEARANCE UR: CLEAR — SIGNIFICANT CHANGE UP
AST SERPL-CCNC: 71 U/L — HIGH (ref 4–40)
AST SERPL-CCNC: 76 U/L — HIGH (ref 4–40)
B PERT DNA SPEC QL NAA+PROBE: SIGNIFICANT CHANGE UP
B PERT+PARAPERT DNA PNL SPEC NAA+PROBE: SIGNIFICANT CHANGE UP
BACTERIA # UR AUTO: ABNORMAL /HPF
BACTERIA # UR AUTO: ABNORMAL /HPF
BASOPHILS # BLD AUTO: 0.11 K/UL — SIGNIFICANT CHANGE UP (ref 0–0.2)
BASOPHILS NFR BLD AUTO: 0.6 % — SIGNIFICANT CHANGE UP (ref 0–2)
BILIRUB DIRECT SERPL-MCNC: 3.6 MG/DL — HIGH (ref 0–0.3)
BILIRUB INDIRECT FLD-MCNC: 0.8 MG/DL — SIGNIFICANT CHANGE UP (ref 0–1)
BILIRUB SERPL-MCNC: 4.4 MG/DL — HIGH (ref 0.2–1.2)
BILIRUB SERPL-MCNC: 4.4 MG/DL — HIGH (ref 0.2–1.2)
BILIRUB UR-MCNC: NEGATIVE — SIGNIFICANT CHANGE UP
BILIRUB UR-MCNC: NEGATIVE — SIGNIFICANT CHANGE UP
BLOOD GAS VENOUS COMPREHENSIVE RESULT: SIGNIFICANT CHANGE UP
BUN SERPL-MCNC: 54 MG/DL — HIGH (ref 7–23)
BUN SERPL-MCNC: 56 MG/DL — HIGH (ref 7–23)
C PNEUM DNA SPEC QL NAA+PROBE: SIGNIFICANT CHANGE UP
CALCIUM SERPL-MCNC: 8.7 MG/DL — SIGNIFICANT CHANGE UP (ref 8.4–10.5)
CAST: 1 /LPF — SIGNIFICANT CHANGE UP (ref 0–4)
CAST: 1 /LPF — SIGNIFICANT CHANGE UP (ref 0–4)
CHLORIDE SERPL-SCNC: 112 MMOL/L — HIGH (ref 98–107)
CHLORIDE SERPL-SCNC: 115 MMOL/L — HIGH (ref 98–107)
CO2 SERPL-SCNC: 17 MMOL/L — LOW (ref 22–31)
CO2 SERPL-SCNC: 18 MMOL/L — LOW (ref 22–31)
COLOR SPEC: SIGNIFICANT CHANGE UP
COLOR SPEC: YELLOW — SIGNIFICANT CHANGE UP
CREAT SERPL-MCNC: 2.06 MG/DL — HIGH (ref 0.5–1.3)
CREAT SERPL-MCNC: 2.2 MG/DL — HIGH (ref 0.5–1.3)
DIFF PNL FLD: ABNORMAL
DIFF PNL FLD: ABNORMAL
EGFR: 37 ML/MIN/1.73M2 — LOW
EGFR: 37 ML/MIN/1.73M2 — LOW
EGFR: 40 ML/MIN/1.73M2 — LOW
EGFR: 40 ML/MIN/1.73M2 — LOW
EOSINOPHIL # BLD AUTO: 0.65 K/UL — HIGH (ref 0–0.5)
EOSINOPHIL NFR BLD AUTO: 3.4 % — SIGNIFICANT CHANGE UP (ref 0–6)
FERRITIN SERPL-MCNC: 4720 NG/ML — HIGH (ref 30–400)
FLUAV AG NPH QL: SIGNIFICANT CHANGE UP
FLUAV SUBTYP SPEC NAA+PROBE: SIGNIFICANT CHANGE UP
FLUBV AG NPH QL: SIGNIFICANT CHANGE UP
FLUBV RNA SPEC QL NAA+PROBE: SIGNIFICANT CHANGE UP
FOLATE SERPL-MCNC: 13.9 NG/ML — SIGNIFICANT CHANGE UP (ref 3.1–17.5)
GLUCOSE BLDC GLUCOMTR-MCNC: 124 MG/DL — HIGH (ref 70–99)
GLUCOSE BLDC GLUCOMTR-MCNC: 126 MG/DL — HIGH (ref 70–99)
GLUCOSE BLDC GLUCOMTR-MCNC: 127 MG/DL — HIGH (ref 70–99)
GLUCOSE SERPL-MCNC: 127 MG/DL — HIGH (ref 70–99)
GLUCOSE SERPL-MCNC: 132 MG/DL — HIGH (ref 70–99)
GLUCOSE UR QL: NEGATIVE MG/DL — SIGNIFICANT CHANGE UP
GLUCOSE UR QL: NEGATIVE MG/DL — SIGNIFICANT CHANGE UP
GRAM STN FLD: ABNORMAL
HADV DNA SPEC QL NAA+PROBE: SIGNIFICANT CHANGE UP
HAPTOGLOB SERPL-MCNC: 45 MG/DL — SIGNIFICANT CHANGE UP (ref 34–200)
HAPTOGLOB SERPL-MCNC: 49 MG/DL — SIGNIFICANT CHANGE UP (ref 34–200)
HCOV 229E RNA SPEC QL NAA+PROBE: SIGNIFICANT CHANGE UP
HCOV HKU1 RNA SPEC QL NAA+PROBE: SIGNIFICANT CHANGE UP
HCOV NL63 RNA SPEC QL NAA+PROBE: SIGNIFICANT CHANGE UP
HCOV OC43 RNA SPEC QL NAA+PROBE: SIGNIFICANT CHANGE UP
HCT VFR BLD CALC: 18.5 % — CRITICAL LOW (ref 39–50)
HCT VFR BLD CALC: 19.2 % — CRITICAL LOW (ref 39–50)
HGB BLD-MCNC: 6.2 G/DL — CRITICAL LOW (ref 13–17)
HGB BLD-MCNC: 6.3 G/DL — CRITICAL LOW (ref 13–17)
HMPV RNA SPEC QL NAA+PROBE: SIGNIFICANT CHANGE UP
HPIV1 RNA SPEC QL NAA+PROBE: SIGNIFICANT CHANGE UP
HPIV2 RNA SPEC QL NAA+PROBE: SIGNIFICANT CHANGE UP
HPIV3 RNA SPEC QL NAA+PROBE: SIGNIFICANT CHANGE UP
HPIV4 RNA SPEC QL NAA+PROBE: SIGNIFICANT CHANGE UP
IANC: 14.95 K/UL — HIGH (ref 1.8–7.4)
IMM GRANULOCYTES NFR BLD AUTO: 1.1 % — HIGH (ref 0–0.9)
IRON SATN MFR SERPL: 43 % — SIGNIFICANT CHANGE UP (ref 14–50)
IRON SATN MFR SERPL: 89 UG/DL — SIGNIFICANT CHANGE UP (ref 45–165)
KETONES UR-MCNC: NEGATIVE MG/DL — SIGNIFICANT CHANGE UP
KETONES UR-MCNC: NEGATIVE MG/DL — SIGNIFICANT CHANGE UP
LDH SERPL L TO P-CCNC: 295 U/L — HIGH (ref 135–225)
LDH SERPL L TO P-CCNC: 312 U/L — HIGH (ref 135–225)
LEUKOCYTE ESTERASE UR-ACNC: ABNORMAL
LEUKOCYTE ESTERASE UR-ACNC: ABNORMAL
LYMPHOCYTES # BLD AUTO: 1.92 K/UL — SIGNIFICANT CHANGE UP (ref 1–3.3)
LYMPHOCYTES # BLD AUTO: 10 % — LOW (ref 13–44)
M PNEUMO DNA SPEC QL NAA+PROBE: SIGNIFICANT CHANGE UP
MAGNESIUM SERPL-MCNC: 1.8 MG/DL — SIGNIFICANT CHANGE UP (ref 1.6–2.6)
MAGNESIUM SERPL-MCNC: 2.1 MG/DL — SIGNIFICANT CHANGE UP (ref 1.6–2.6)
MCHC RBC-ENTMCNC: 29.7 PG — SIGNIFICANT CHANGE UP (ref 27–34)
MCHC RBC-ENTMCNC: 30.2 PG — SIGNIFICANT CHANGE UP (ref 27–34)
MCHC RBC-ENTMCNC: 32.8 G/DL — SIGNIFICANT CHANGE UP (ref 32–36)
MCHC RBC-ENTMCNC: 33.5 G/DL — SIGNIFICANT CHANGE UP (ref 32–36)
MCV RBC AUTO: 90.2 FL — SIGNIFICANT CHANGE UP (ref 80–100)
MCV RBC AUTO: 90.6 FL — SIGNIFICANT CHANGE UP (ref 80–100)
MONOCYTES # BLD AUTO: 1.37 K/UL — HIGH (ref 0–0.9)
MONOCYTES NFR BLD AUTO: 7.1 % — SIGNIFICANT CHANGE UP (ref 2–14)
NEUTROPHILS # BLD AUTO: 14.95 K/UL — HIGH (ref 1.8–7.4)
NEUTROPHILS NFR BLD AUTO: 77.8 % — HIGH (ref 43–77)
NITRITE UR-MCNC: NEGATIVE — SIGNIFICANT CHANGE UP
NITRITE UR-MCNC: NEGATIVE — SIGNIFICANT CHANGE UP
NRBC # BLD AUTO: 0.02 K/UL — HIGH (ref 0–0)
NRBC # BLD AUTO: 0.02 K/UL — HIGH (ref 0–0)
NRBC # FLD: 0.02 K/UL — HIGH (ref 0–0)
NRBC # FLD: 0.02 K/UL — HIGH (ref 0–0)
NRBC BLD AUTO-RTO: 0 /100 WBCS — SIGNIFICANT CHANGE UP (ref 0–0)
NRBC BLD AUTO-RTO: 0 /100 WBCS — SIGNIFICANT CHANGE UP (ref 0–0)
PH UR: 6.5 — SIGNIFICANT CHANGE UP (ref 5–8)
PH UR: 6.5 — SIGNIFICANT CHANGE UP (ref 5–8)
PHOSPHATE SERPL-MCNC: 4.1 MG/DL — SIGNIFICANT CHANGE UP (ref 2.5–4.5)
PHOSPHATE SERPL-MCNC: 4.4 MG/DL — SIGNIFICANT CHANGE UP (ref 2.5–4.5)
PLATELET # BLD AUTO: 300 K/UL — SIGNIFICANT CHANGE UP (ref 150–400)
PLATELET # BLD AUTO: 308 K/UL — SIGNIFICANT CHANGE UP (ref 150–400)
POTASSIUM SERPL-MCNC: 3.6 MMOL/L — SIGNIFICANT CHANGE UP (ref 3.5–5.3)
POTASSIUM SERPL-MCNC: 4.3 MMOL/L — SIGNIFICANT CHANGE UP (ref 3.5–5.3)
POTASSIUM SERPL-SCNC: 3.6 MMOL/L — SIGNIFICANT CHANGE UP (ref 3.5–5.3)
POTASSIUM SERPL-SCNC: 4.3 MMOL/L — SIGNIFICANT CHANGE UP (ref 3.5–5.3)
PROCALCITONIN SERPL-MCNC: 0.56 NG/ML — HIGH (ref 0.02–0.1)
PROT SERPL-MCNC: 6.7 G/DL — SIGNIFICANT CHANGE UP (ref 6–8.3)
PROT SERPL-MCNC: 7.2 G/DL — SIGNIFICANT CHANGE UP (ref 6–8.3)
PROT UR-MCNC: 100 MG/DL
PROT UR-MCNC: 100 MG/DL
PTH-INTACT FLD-MCNC: 98 PG/ML — HIGH (ref 15–65)
RAPID RVP RESULT: SIGNIFICANT CHANGE UP
RBC # BLD: 2.05 M/UL — LOW (ref 4.2–5.8)
RBC # BLD: 2.05 M/UL — LOW (ref 4.2–5.8)
RBC # BLD: 2.12 M/UL — LOW (ref 4.2–5.8)
RBC # BLD: 2.12 M/UL — LOW (ref 4.2–5.8)
RBC # FLD: 18.5 % — HIGH (ref 10.3–14.5)
RBC # FLD: 18.8 % — HIGH (ref 10.3–14.5)
RBC CASTS # UR COMP ASSIST: 6 /HPF — HIGH (ref 0–4)
RBC CASTS # UR COMP ASSIST: 7 /HPF — HIGH (ref 0–4)
RETICS #: 67.5 K/UL — SIGNIFICANT CHANGE UP (ref 25–125)
RETICS #: 76.3 K/UL — SIGNIFICANT CHANGE UP (ref 25–125)
RETICS/RBC NFR: 3.2 % — HIGH (ref 0.5–2.5)
RETICS/RBC NFR: 3.6 % — HIGH (ref 0.5–2.5)
REVIEW: SIGNIFICANT CHANGE UP
REVIEW: SIGNIFICANT CHANGE UP
RSV RNA NPH QL NAA+NON-PROBE: SIGNIFICANT CHANGE UP
RSV RNA SPEC QL NAA+PROBE: SIGNIFICANT CHANGE UP
RV+EV RNA SPEC QL NAA+PROBE: SIGNIFICANT CHANGE UP
SARS-COV-2 RNA SPEC QL NAA+PROBE: SIGNIFICANT CHANGE UP
SARS-COV-2 RNA SPEC QL NAA+PROBE: SIGNIFICANT CHANGE UP
SODIUM SERPL-SCNC: 142 MMOL/L — SIGNIFICANT CHANGE UP (ref 135–145)
SODIUM SERPL-SCNC: 145 MMOL/L — SIGNIFICANT CHANGE UP (ref 135–145)
SOURCE RESPIRATORY: SIGNIFICANT CHANGE UP
SP GR SPEC: 1.01 — SIGNIFICANT CHANGE UP (ref 1–1.03)
SP GR SPEC: 1.01 — SIGNIFICANT CHANGE UP (ref 1–1.03)
SPECIMEN SOURCE: SIGNIFICANT CHANGE UP
SQUAMOUS # UR AUTO: 0 /HPF — SIGNIFICANT CHANGE UP (ref 0–5)
SQUAMOUS # UR AUTO: 1 /HPF — SIGNIFICANT CHANGE UP (ref 0–5)
TIBC SERPL-MCNC: 208 UG/DL — LOW (ref 220–430)
UIBC SERPL-MCNC: 119 UG/DL — SIGNIFICANT CHANGE UP (ref 110–370)
UROBILINOGEN FLD QL: 0.2 MG/DL — SIGNIFICANT CHANGE UP (ref 0.2–1)
UROBILINOGEN FLD QL: 0.2 MG/DL — SIGNIFICANT CHANGE UP (ref 0.2–1)
VIT B12 SERPL-MCNC: 796 PG/ML — SIGNIFICANT CHANGE UP (ref 200–900)
WBC # BLD: 18.8 K/UL — HIGH (ref 3.8–10.5)
WBC # BLD: 19.22 K/UL — HIGH (ref 3.8–10.5)
WBC # FLD AUTO: 18.8 K/UL — HIGH (ref 3.8–10.5)
WBC # FLD AUTO: 19.22 K/UL — HIGH (ref 3.8–10.5)
WBC UR QL: 44 /HPF — HIGH (ref 0–5)
WBC UR QL: >50 /HPF — SIGNIFICANT CHANGE UP (ref 0–5)

## 2025-04-20 PROCEDURE — 71045 X-RAY EXAM CHEST 1 VIEW: CPT | Mod: 26

## 2025-04-20 PROCEDURE — 99232 SBSQ HOSP IP/OBS MODERATE 35: CPT

## 2025-04-20 PROCEDURE — 74178 CT ABD&PLV WO CNTR FLWD CNTR: CPT | Mod: 26

## 2025-04-20 RX ORDER — MAGNESIUM SULFATE 500 MG/ML
1 SYRINGE (ML) INJECTION ONCE
Refills: 0 | Status: COMPLETED | OUTPATIENT
Start: 2025-04-20 | End: 2025-04-20

## 2025-04-20 RX ORDER — SODIUM BICARBONATE 1 MEQ/ML
650 SYRINGE (ML) INTRAVENOUS ONCE
Refills: 0 | Status: COMPLETED | OUTPATIENT
Start: 2025-04-20 | End: 2025-04-20

## 2025-04-20 RX ORDER — ACETAMINOPHEN 500 MG/5ML
500 LIQUID (ML) ORAL ONCE
Refills: 0 | Status: COMPLETED | OUTPATIENT
Start: 2025-04-20 | End: 2025-04-20

## 2025-04-20 RX ORDER — SODIUM BICARBONATE 1 MEQ/ML
1300 SYRINGE (ML) INTRAVENOUS THREE TIMES A DAY
Refills: 0 | Status: DISCONTINUED | OUTPATIENT
Start: 2025-04-20 | End: 2025-05-01

## 2025-04-20 RX ADMIN — Medication 650 MILLIGRAM(S): at 07:51

## 2025-04-20 RX ADMIN — Medication 100 GRAM(S): at 07:51

## 2025-04-20 RX ADMIN — LEVETIRACETAM 500 MILLIGRAM(S): 10 INJECTION, SOLUTION INTRAVENOUS at 18:03

## 2025-04-20 RX ADMIN — Medication 200 MILLIGRAM(S): at 00:35

## 2025-04-20 RX ADMIN — Medication 15 MILLILITER(S): at 18:04

## 2025-04-20 RX ADMIN — Medication 1300 MILLIGRAM(S): at 21:52

## 2025-04-20 RX ADMIN — Medication 1300 MILLIGRAM(S): at 13:35

## 2025-04-20 RX ADMIN — Medication 1 APPLICATION(S): at 11:27

## 2025-04-20 RX ADMIN — IPRATROPIUM BROMIDE AND ALBUTEROL SULFATE 3 MILLILITER(S): .5; 2.5 SOLUTION RESPIRATORY (INHALATION) at 03:27

## 2025-04-20 RX ADMIN — CLONAZEPAM 2 MILLIGRAM(S): 0.5 TABLET ORAL at 05:01

## 2025-04-20 RX ADMIN — IPRATROPIUM BROMIDE AND ALBUTEROL SULFATE 3 MILLILITER(S): .5; 2.5 SOLUTION RESPIRATORY (INHALATION) at 20:29

## 2025-04-20 RX ADMIN — CLONAZEPAM 2 MILLIGRAM(S): 0.5 TABLET ORAL at 13:35

## 2025-04-20 RX ADMIN — Medication 40 MILLIGRAM(S): at 05:01

## 2025-04-20 RX ADMIN — IPRATROPIUM BROMIDE AND ALBUTEROL SULFATE 3 MILLILITER(S): .5; 2.5 SOLUTION RESPIRATORY (INHALATION) at 09:34

## 2025-04-20 RX ADMIN — Medication 40 MILLIEQUIVALENT(S): at 07:51

## 2025-04-20 RX ADMIN — IPRATROPIUM BROMIDE AND ALBUTEROL SULFATE 3 MILLILITER(S): .5; 2.5 SOLUTION RESPIRATORY (INHALATION) at 15:54

## 2025-04-20 RX ADMIN — Medication 0.5 MILLIGRAM(S): at 21:41

## 2025-04-20 RX ADMIN — LEVETIRACETAM 500 MILLIGRAM(S): 10 INJECTION, SOLUTION INTRAVENOUS at 05:02

## 2025-04-20 RX ADMIN — Medication 650 MILLIGRAM(S): at 05:02

## 2025-04-20 RX ADMIN — CLONAZEPAM 2 MILLIGRAM(S): 0.5 TABLET ORAL at 21:52

## 2025-04-20 RX ADMIN — Medication 40 MILLIGRAM(S): at 18:03

## 2025-04-20 NOTE — PROGRESS NOTE ADULT - ASSESSMENT
45 YO M with PMHx of sickle cell disease with prior acute chest syndrome requiring transfusion and exchange in the past (last 12/2024), iron overload, gout, HTN, and RUE DVT in 12/2024 on eliquis who presented on 3/15 by his hematologist second to anemia and hypoxia, down to 5.3 from baseline 7-8, with concern for vaso-occlusive crisis. While on medicine, anemia improved post transfusion and continued on SCC pain control PCA. Patient noted with left renal mass during prior admission and s/p L ureteroscopy with biopsy and stent placement on 3/19. Course complicated with hypoglycemia on 3/20 with RRT x 2 and found with severe anemia to 3.4 with hemorrhagic shock second to left perirenal and subcapsular hemorrhage and ultimately PEA arrest. ROSC achieved and transferred to MICU. s/p IR emobilization and course complicated anoxic brain injury, myoclonic jerks, GIB, MATA, HAGMA, DVTs (R IJ and LUE brachial), dysphagia, cirrhosis, and prolonged vent time s/p trach on 4/4. Transferred to [facility] on 4/18.     NEUROLOGY  # Anoxic brain injury   - s/p cardiac arrest on 3/20 with ROSC in 4 minutes   - CT HEAD post arrest with diffuse sulcal effacement with increased intracranial pressure, and few patchy foci of cortical blurring concern for HIE  - MRI brain post arrest with with diffuse global abnormal supratentorial cortical restricted diffusion consistent with global hypoxic injury   - Monitor for now    # Seizures vs myoclonic jerks   - Witness myoclonic jerking concerning for seizures with anoxic brain injury  - vEEG with abundant myoclonic seizures in the setting of a severe diffuse/multifocal cerebral dysfunction which can be seen in the setting of sedative effect or due to anoxia  - Continue on keppra   - Continue on valium 10 IV Q4H and switched to klonopin 2mg TID   - Continue on dilaudid PRN     CARDIOLOGY  # Shock state likely hemorrhagic vs vasoplegia  - Acute renal bleed noted requiring multiple transfusions and pressors in ICU.   - TTE 3/22 with EF 63 with normal LVRVSF with PASP 49  - Weaned off pressors in ICU and complicated by hypertension.   - Started on norvasc 5, however dc'ed as BP improved on opioids   - Monitor BP     # Autonomia vs pain  - Mild HTN and tachypnea noted likely pain induced vs autonomia?   - Continue on dilaudid pushes as needed     PULMONARY  # Respiratory failure   - Presented with SCC and hypoxia likely from anemia with no signs of acute chest, however noted with PEA with hemorrhagic shock post renal bx requiring intubation.   - Prolonged intubation and s/p 7 PORTEX trach on 4/4   - Continue on vent 20/460/6/30   - Continue on nebs.   - Continue on dilaudid PRN     GI  # Shock liver vs cirrhosis   - Transaminitis noted in ICU and thought to be second to shock liver likely second to hemorrhagic shock vs cardiac arrest, however LFTs remains elevated with acute on chronic hyperbilirubinemia.   - US ABD suggestive of early cirrhotic changes, cholecystectomy, and CBD 9mm   - Trend LFTs and bili   - Monitor scleral ictus    # GIB  - Anemia with GIB noted in ICU   - s/p EGD 4/11 with cauterization of gastric ulcer & hemostatic spraying of three duodenal ulcers. Gastric ulcer likely due to NGT trauma.   - Continue on PPI BID   - Monitor stools and HH     # Oropharyngeal dysphagia   - GI unable to place PEG due to hepatomegaly.   - IR unable to place PEG second to no safe window   - s/p open G TUBE placement on 4/18   - Continue on TF    RENAL:  # Acute renal failure and HAGMA second to shock state   - Scr on admission was 1.25 (3/15/25) and increased to 6s while in ICU and s/p HD 3/24-3/29 and then again on 4/4.   - L SHILE removed on 4/15   - CRE slowly improving but mild acidosis remains   - Continue on HCO3 1300 TID tabs for now   - Continue with daniel for now  - Monitor renal function, acidosis, and UOP     # L renal mass bx c/b hematoma   - Renal bx as below on 3/19  - Hemorrhagic shock and PEA arrest on 3/20   - Renal US with large L renal subcapsular hematoma.    - IR left renal angiogram and embolization on 3/20     # Hypernatremia  - s/p D5W   - c/w  Q6H   - Monitor sodium     INFECTIOUS DISEASE   # VAP   - Post arrest noted with concern for aspiration and VAP in ICU   - Flu, SCx, BAL, BCx and UCx negative   - MRSA PCR with MSSA and completed bactroban in ICU   - Completed aztreonam (3/22-3/27) then casey (3/27-3/31) and vanco by dose in ICU   - CXR in ICU with RUL consolidation and completed recurrent casey course in ICU (4/2-4/7).   - Monitor off ABX    HEMATOLOGIC  # Anemia with SCC vs L kidney bleed vs GIB   - Baseline hemoglobin outpatient ~6.0 and sent in for anemia down to 5.3 without signs of bleeding and more likely SCC.    - Transfusion given on admission and improved back to baseline.   - Course complicated by hemorrhagic shock from left renal hematoma and HH down to 3.4.  - s/p 10U PRBC total while in ICU   - s/p PRBC pre-GTUBE with HH increased to 8.0.   - HH dropping post op down to 7.0, however no acute signs of bleeding from surgical site or and/ or signs of sickling.   - HH more likely trending back to baseline.   - Monitor HH     # SCD   - Discontinued deferasirox due to current renal failure   - Monitor Sickle labs QD (CBC with Diff, Retic, BMP, Hepatic Function, LDH and Hapto, and VBG).     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   - Heparin GTT held for G TUBE  - Hold resuming and monitor HH  - LUE precautions     # LUE arm infiltration   - HCO3 GTT infilitration in ICU   - Seen by hand sx and no compartment syndrome concern   - Monitor for now     # RUE blood infiltration   - RUE blood transfusion infiltration noted on 4/17 with area of ecchymosis   - RPT DOPPLER with known R IJ DVT, however no upper arm thrombus or issues in area of infiltration  - Monitor site for now    ENDOCRINE   # Glycemic control   - Monitor BG   - No hx of DM2     SKIN   - Wound care reccs appreciated    ETHICS   - FULL CODE     DISPO - TBD  ************  4/19-H/H drop 45 YO M with PMHx of sickle cell disease with prior acute chest syndrome requiring transfusion and exchange in the past (last 12/2024), iron overload, gout, HTN, and RUE DVT in 12/2024 on eliquis who presented on 3/15 by his hematologist second to anemia and hypoxia, down to 5.3 from baseline 7-8, with concern for vaso-occlusive crisis. While on medicine, anemia improved post transfusion and continued on SCC pain control PCA. Patient noted with left renal mass during prior admission and s/p L ureteroscopy with biopsy and stent placement on 3/19. Course complicated with hypoglycemia on 3/20 with RRT x 2 and found with severe anemia to 3.4 with hemorrhagic shock second to left perirenal and subcapsular hemorrhage and ultimately PEA arrest. ROSC achieved and transferred to MICU. s/p IR emobilization and course complicated anoxic brain injury, myoclonic jerks, GIB, MATA, HAGMA, DVTs (R IJ and LUE brachial), dysphagia, cirrhosis, and prolonged vent time s/p trach on 4/4. Transferred to [facility] on 4/18.     NEUROLOGY  # Anoxic brain injury   - s/p cardiac arrest on 3/20 with ROSC in 4 minutes   - CT HEAD post arrest with diffuse sulcal effacement with increased intracranial pressure, and few patchy foci of cortical blurring concern for HIE  - MRI brain post arrest with with diffuse global abnormal supratentorial cortical restricted diffusion consistent with global hypoxic injury   - Monitor for now    # Seizures vs myoclonic jerks   - Witness myoclonic jerking concerning for seizures with anoxic brain injury  - vEEG with abundant myoclonic seizures in the setting of a severe diffuse/multifocal cerebral dysfunction which can be seen in the setting of sedative effect or due to anoxia  - Continue on keppra   - Continue on valium 10 IV Q4H and switched to klonopin 2mg TID   - Continue on dilaudid PRN     CARDIOLOGY  # Shock state likely hemorrhagic vs vasoplegia  - Acute renal bleed noted requiring multiple transfusions and pressors in ICU.   - TTE 3/22 with EF 63 with normal LVRVSF with PASP 49  - Weaned off pressors in ICU and complicated by hypertension.   - Started on norvasc 5, however dc'ed as BP improved on opioids   - Monitor BP     # Autonomia vs pain  - Mild HTN and tachypnea noted likely pain induced vs autonomia?   - Continue on dilaudid pushes as needed     PULMONARY  # Respiratory failure   - Presented with SCC and hypoxia likely from anemia with no signs of acute chest, however noted with PEA with hemorrhagic shock post renal bx requiring intubation.   - Prolonged intubation and s/p 7 PORTEX trach on 4/4   - Continue on vent 20/460/6/30   - Continue on nebs.   - Continue on dilaudid PRN     GI  # Shock liver vs cirrhosis   - Transaminitis noted in ICU and thought to be second to shock liver likely second to hemorrhagic shock vs cardiac arrest, however LFTs remains elevated with acute on chronic hyperbilirubinemia.   - US ABD suggestive of early cirrhotic changes, cholecystectomy, and CBD 9mm   - Trend LFTs and bili   - Monitor scleral ictus    # GIB  - Anemia with GIB noted in ICU   - s/p EGD 4/11 with cauterization of gastric ulcer & hemostatic spraying of three duodenal ulcers. Gastric ulcer likely due to NGT trauma.   - Continue on PPI BID   - Monitor stools and HH     # Oropharyngeal dysphagia   - GI unable to place PEG due to hepatomegaly.   - IR unable to place PEG second to no safe window   - s/p open G TUBE placement on 4/18   - Continue on TF    RENAL  # Acute renal failure and HAGMA second to shock state   - Scr on admission was 1.25 (3/15/25) and increased to 6s while in ICU and s/p HD 3/24-3/29 and then again on 4/4.   - L SHILE removed on 4/15   - CRE slowly improving but mild acidosis remains   - Continue on HCO3 1300 TID tabs for now   - Continue with daniel for now  - Monitor renal function, acidosis, and UOP     # L renal mass bx c/b hematoma   - Renal bx as below on 3/19  - Hemorrhagic shock and PEA arrest on 3/20   - Renal US with large L renal subcapsular hematoma.    - IR left renal angiogram and embolization on 3/20     # Hypernatremia  - s/p D5W   - c/w  Q6H   - Monitor sodium     # Hyperphosphatemia   - Phos elevation likely second to renal failure   - PTH normal  - Phos slowly down trending   - Monitor phos    INFECTIOUS DISEASE   # VAP   - Post arrest noted with concern for aspiration and VAP in ICU   - Flu, SCx, BAL, BCx and UCx negative   - MRSA PCR with MSSA and completed bactroban in ICU   - Completed aztreonam (3/22-3/27) then casey (3/27-3/31) and vanco by dose in ICU   - CXR in ICU with RUL consolidation and completed recurrent casey course in ICU (4/2-4/7).   - Monitor off ABX    HEMATOLOGIC  # Anemia with SCC vs L kidney bleed vs GIB   - Baseline hemoglobin outpatient ~6.0 and sent in for anemia down to 5.3 without signs of bleeding and more likely SCC.    - Transfusion given on admission and improved back to baseline.   - Course complicated by hemorrhagic shock from left renal hematoma and HH down to 3.4.  - s/p 10U PRBC total while in ICU   - s/p PRBC pre-GTUBE with HH increased to 8.0.   - HH dropping post op down to 7.0, however no acute signs of bleeding from surgical site or and/ or signs of sickling.   - HH more likely trending back to baseline.   - Monitor HH     # SCD   - Discontinued deferasirox due to current renal failure   - Monitor Sickle labs QD (CBC with Diff, Retic, BMP, Hepatic Function, LDH and Hapto, and VBG).     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   - Heparin GTT held for G TUBE  - Hold resuming and monitor HH  - LUE precautions     # LUE arm infiltration   - HCO3 GTT infilitration in ICU   - Seen by hand sx and no compartment syndrome concern   - Monitor for now     # RUE blood infiltration   - RUE blood transfusion infiltration noted on 4/17 with area of ecchymosis   - RPT DOPPLER with known R IJ DVT, however no upper arm thrombus or issues in area of infiltration  - Monitor site for now    ENDOCRINE   # Glycemic control   - Monitor BG   - No hx of DM2     SKIN   - Wound care reccs appreciated    ETHICS   - FULL CODE     DISPO - TBD  ************  4/19-H/H drop 45 YO M with PMHx of sickle cell disease with prior acute chest syndrome requiring transfusion and exchange in the past (last 12/2024), iron overload, gout, HTN, and RUE DVT in 12/2024 on eliquis who presented on 3/15 by his hematologist second to anemia and hypoxia, down to 5.3 from baseline 7-8, with concern for vaso-occlusive crisis. While on medicine, anemia improved post transfusion and continued on SCC pain control PCA. Patient noted with left renal mass during prior admission and s/p L ureteroscopy with biopsy and stent placement on 3/19. Course complicated with hypoglycemia on 3/20 with RRT x 2 and found with severe anemia to 3.4 with hemorrhagic shock second to left perirenal and subcapsular hemorrhage and ultimately PEA arrest. ROSC achieved and transferred to MICU. s/p IR emobilization and course complicated anoxic brain injury, myoclonic jerks, GIB, MATA, HAGMA, DVTs (R IJ and LUE brachial), dysphagia, cirrhosis, and prolonged vent time s/p trach on 4/4. Transferred to [facility] on 4/18.     NEUROLOGY  # Anoxic brain injury   - s/p cardiac arrest on 3/20 with ROSC in 4 minutes   - CT HEAD post arrest with diffuse sulcal effacement with increased intracranial pressure, and few patchy foci of cortical blurring concern for HIE  - MRI brain post arrest with with diffuse global abnormal supratentorial cortical restricted diffusion consistent with global hypoxic injury   - Monitor for now    # Seizures vs myoclonic jerks   - Witness myoclonic jerking concerning for seizures with anoxic brain injury  - vEEG with abundant myoclonic seizures in the setting of a severe diffuse/multifocal cerebral dysfunction which can be seen in the setting of sedative effect or due to anoxia  - Continue on keppra   - Continue on valium 10 IV Q4H and switched to klonopin 2mg TID   - Continue on dilaudid PRN     CARDIOLOGY  # Shock state likely hemorrhagic vs vasoplegia  - Acute renal bleed noted requiring multiple transfusions and pressors in ICU.   - TTE 3/22 with EF 63 with normal LVRVSF with PASP 49  - Weaned off pressors in ICU and complicated by hypertension.   - Started on norvasc 5, however dc'ed as BP improved on opioids   - Monitor BP     # Autonomia vs pain  - Mild HTN and tachypnea noted likely pain induced vs autonomia?   - Continue on dilaudid pushes as needed     PULMONARY  # Respiratory failure   - Presented with SCC and hypoxia likely from anemia with no signs of acute chest, however noted with PEA with hemorrhagic shock post renal bx requiring intubation.   - Prolonged intubation and s/p 7 PORTEX trach on 4/4   - Continue on vent 20/460/6/30   - Continue on nebs.   - Continue on dilaudid PRN     GI  # Shock liver vs cirrhosis   - Transaminitis noted in ICU and thought to be second to shock liver likely second to hemorrhagic shock vs cardiac arrest, however LFTs remains elevated with acute on chronic hyperbilirubinemia.   - US ABD suggestive of early cirrhotic changes, cholecystectomy, and CBD 9mm   - Trend LFTs and bili   - Monitor scleral ictus    # GIB  - Anemia with GIB noted in ICU   - s/p EGD 4/11 with cauterization of gastric ulcer & hemostatic spraying of three duodenal ulcers. Gastric ulcer likely due to NGT trauma.   - Continue on PPI BID   - Monitor stools and HH     # Oropharyngeal dysphagia   - GI unable to place PEG due to hepatomegaly.   - IR unable to place PEG second to no safe window   - s/p open G TUBE placement on 4/18   - Continue on TF    RENAL  # Acute renal failure and HAGMA second to shock state   - Scr on admission was 1.25 (3/15/25) and increased to 6s while in ICU and s/p HD 3/24-3/29 and then again on 4/4.   - L SHILE removed on 4/15   - CRE slowly improving but mild acidosis remains   - Continue on HCO3 1300 TID tabs for now   - Continue with daniel for now  - Monitor renal function, acidosis, and UOP     # L renal mass bx c/b hematoma   - Renal bx as below on 3/19  - Hemorrhagic shock and PEA arrest on 3/20   - Renal US with large L renal subcapsular hematoma.    - IR left renal angiogram and embolization on 3/20     # Hypernatremia  - s/p D5W   - c/w  Q6H   - Monitor sodium     # Hyperphosphatemia   - Phos elevation likely second to renal failure   - PTH elevated, however Phos slowly down trending   - Monitor phos    INFECTIOUS DISEASE   # VAP   - Post arrest noted with concern for aspiration and VAP in ICU   - Flu, SCx, BAL, BCx and UCx negative   - MRSA PCR with MSSA and completed bactroban in ICU   - Completed aztreonam (3/22-3/27) then casey (3/27-3/31) and vanco by dose in ICU   - CXR in ICU with RUL consolidation and completed recurrent casey course in ICU (4/2-4/7).   - Monitor off ABX    HEMATOLOGIC  # Anemia with SCC vs L kidney bleed vs GIB   - Baseline hemoglobin outpatient ~6.0 and sent in for anemia down to 5.3 without signs of bleeding and more likely SCC.    - Transfusion given on admission and improved back to baseline.   - Course complicated by hemorrhagic shock from left renal hematoma and HH down to 3.4.  - s/p 10U PRBC total while in ICU   - s/p PRBC pre-GTUBE with HH increased to 8.0.   - HH dropping post op down to 7.0, however no acute signs of bleeding from surgical site or and/ or signs of sickling.   - HH more likely trending back to baseline.   - Monitor HH     # SCD   - Discontinued deferasirox due to current renal failure   - Monitor Sickle labs QD (CBC with Diff, Retic, BMP, Hepatic Function, LDH and Hapto, and VBG).     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   - Heparin GTT held for G TUBE  - Hold resuming and monitor HH  - LUE precautions     # LUE arm infiltration   - HCO3 GTT infilitration in ICU   - Seen by hand sx and no compartment syndrome concern   - Monitor for now     # RUE blood infiltration   - RUE blood transfusion infiltration noted on 4/17 with area of ecchymosis   - RPT DOPPLER with known R IJ DVT, however no upper arm thrombus or issues in area of infiltration  - Monitor site for now    ENDOCRINE   # Glycemic control   - Monitor BG   - No hx of DM2     SKIN   - Wound care reccs appreciated    ETHICS   - FULL CODE     DISPO - TBD  ************  4/19-H/H drop 45 YO M with PMHx of sickle cell disease with prior acute chest syndrome requiring transfusion and exchange in the past (last 12/2024), iron overload, gout, HTN, and RUE DVT in 12/2024 on eliquis who presented on 3/15 by his hematologist second to anemia and hypoxia, down to 5.3 from baseline 7-8, with concern for vaso-occlusive crisis. While on medicine, anemia improved post transfusion and continued on SCC pain control PCA. Patient noted with left renal mass during prior admission and s/p L ureteroscopy with biopsy and stent placement on 3/19. Course complicated with hypoglycemia on 3/20 with RRT x 2 and found with severe anemia to 3.4 with hemorrhagic shock second to left perirenal and subcapsular hemorrhage and ultimately PEA arrest. ROSC achieved and transferred to MICU. s/p IR emobilization and course complicated anoxic brain injury, myoclonic jerks, GIB, MATA, HAGMA, DVTs (R IJ and LUE brachial), dysphagia, cirrhosis, and prolonged vent time s/p trach on 4/4. Transferred to [facility] on 4/18.     NEUROLOGY  # Anoxic brain injury   - s/p cardiac arrest on 3/20 with ROSC in 4 minutes   - CT HEAD post arrest with diffuse sulcal effacement with increased intracranial pressure, and few patchy foci of cortical blurring concern for HIE  - MRI brain post arrest with with diffuse global abnormal supratentorial cortical restricted diffusion consistent with global hypoxic injury   - Monitor for now    # Seizures vs myoclonic jerks   - Witness myoclonic jerking concerning for seizures with anoxic brain injury  - vEEG with abundant myoclonic seizures in the setting of a severe diffuse/multifocal cerebral dysfunction which can be seen in the setting of sedative effect or due to anoxia  - Continue on keppra   - Continue on valium 10 IV Q4H and switched to klonopin 2mg TID   - Continue on dilaudid PRN     CARDIOLOGY  # Shock state likely hemorrhagic vs vasoplegia  - Acute renal bleed noted requiring multiple transfusions and pressors in ICU.   - TTE 3/22 with EF 63 with normal LVRVSF with PASP 49  - Weaned off pressors in ICU and complicated by hypertension.   - Started on norvasc 5, however dc'ed as BP improved on opioids   - Monitor BP     # Autonomia vs pain  - Mild HTN and tachypnea noted likely pain induced vs autonomia?   - Continue on dilaudid pushes as needed     PULMONARY  # Respiratory failure   - Presented with SCC and hypoxia likely from anemia with no signs of acute chest, however noted with PEA with hemorrhagic shock post renal bx requiring intubation.   - Prolonged intubation and s/p 7 PORTEX trach on 4/4   - Continue on vent 20/460/6/30   - Continue on nebs.   - Continue on dilaudid PRN     GI  # Shock liver vs cirrhosis   - Transaminitis noted in ICU and thought to be second to shock liver likely second to hemorrhagic shock vs cardiac arrest, however LFTs remains elevated with acute on chronic hyperbilirubinemia.   - US ABD suggestive of early cirrhotic changes, cholecystectomy, and CBD 9mm   - Trend LFTs and bili   - Monitor scleral ictus    # GIB  - Anemia with GIB noted in ICU   - s/p EGD 4/11 with cauterization of gastric ulcer & hemostatic spraying of three duodenal ulcers. Gastric ulcer likely due to NGT trauma.   - Continue on PPI BID   - Monitor stools and HH     # Oropharyngeal dysphagia   - GI unable to place PEG due to hepatomegaly.   - IR unable to place PEG second to no safe window   - s/p open G TUBE placement on 4/18   - Continue on TF    RENAL  # Acute renal failure and HAGMA second to shock state   - Scr on admission was 1.25 (3/15/25) and increased to 6s while in ICU and s/p HD 3/24-3/29 and then again on 4/4.   - L SHILE removed on 4/15   - CRE slowly improving but mild acidosis remains   - Continue on HCO3 1300 TID tabs for now   - Continue with daniel for now  - Monitor renal function, acidosis, and UOP     # L renal mass bx c/b hematoma   - Renal bx as below on 3/19  - Hemorrhagic shock and PEA arrest on 3/20   - Renal US with large L renal subcapsular hematoma.    - IR left renal angiogram and embolization on 3/20     # Hypernatremia  - s/p D5W   - c/w  Q6H   - Monitor sodium     # Hyperphosphatemia   - Phos elevation likely second to renal failure   - PTH elevated, however Phos slowly down trending   - Monitor phos    INFECTIOUS DISEASE   # VAP   - Post arrest noted with concern for aspiration and VAP in ICU   - Flu, SCx, BAL, BCx and UCx negative   - MRSA PCR with MSSA and completed bactroban in ICU   - Completed aztreonam (3/22-3/27) then casey (3/27-3/31) and vanco by dose in ICU   - CXR in ICU with RUL consolidation and completed recurrent casey course in ICU (4/2-4/7).   - Monitor off ABX    HEMATOLOGIC  # Anemia with SCC vs L kidney bleed vs GIB   - Baseline hemoglobin outpatient ~6.0 and sent in for anemia down to 5.3 without signs of bleeding and more likely SCC.    - Transfusion given on admission and improved back to baseline.   - Course complicated by hemorrhagic shock from left renal hematoma and HH down to 3.4.  - s/p 10U PRBC total while in ICU   - s/p PRBC pre-GTUBE with HH increased to 8.0.   - HH dropping post op down to 7.0, however no acute signs of bleeding from surgical site or and/ or signs of sickling.   - HH more likely trending back to baseline.   - Monitor HH     # SCD   - Discontinued deferasirox due to current renal failure   - Monitor Sickle labs QD (CBC with Diff, Retic, BMP, Hepatic Function, LDH and Hapto, and VBG).     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   - Heparin GTT held for G TUBE  - Hold resuming and monitor HH  - LUE precautions     # LUE arm infiltration   - HCO3 GTT infilitration in ICU   - Seen by hand sx and no compartment syndrome concern   - Monitor for now     # RUE blood infiltration   - RUE blood transfusion infiltration noted on 4/17 with area of ecchymosis   - RPT DOPPLER with known R IJ DVT, however no upper arm thrombus or issues in area of infiltration  - Monitor site for now    ENDOCRINE   # Glycemic control   - Monitor BG   - No hx of DM2     SKIN   - Wound care reccs appreciated    ETHICS   - FULL CODE     DISPO - TBD  ************  4/19-H/H drop  4/20-lower H/H and fever--for CT a/p to be done d/w brother (health proxy-emergency contact) 43 YO M with PMHx of sickle cell disease with prior acute chest syndrome requiring transfusion and exchange in the past (last 12/2024), iron overload, gout, HTN, and RUE DVT in 12/2024 on eliquis who presented on 3/15 by his hematologist second to anemia and hypoxia, down to 5.3 from baseline 7-8, with concern for vaso-occlusive crisis. While on medicine, anemia improved post transfusion and continued on SCC pain control PCA. Patient noted with left renal mass during prior admission and s/p L ureteroscopy with biopsy and stent placement on 3/19. Course complicated with hypoglycemia on 3/20 with RRT x 2 and found with severe anemia to 3.4 with hemorrhagic shock second to left perirenal and subcapsular hemorrhage and ultimately PEA arrest. ROSC achieved and transferred to MICU. s/p IR emobilization and course complicated anoxic brain injury, myoclonic jerks, GIB, MATA, HAGMA, DVTs (R IJ and LUE brachial), dysphagia, cirrhosis, and prolonged vent time s/p trach on 4/4. Transferred to [facility] on 4/18.     NEUROLOGY  # Anoxic brain injury   - s/p cardiac arrest on 3/20 with ROSC in 4 minutes   - CT HEAD post arrest with diffuse sulcal effacement with increased intracranial pressure, and few patchy foci of cortical blurring concern for HIE  - MRI brain post arrest with with diffuse global abnormal supratentorial cortical restricted diffusion consistent with global hypoxic injury   - Monitor for now    # Seizures vs myoclonic jerks   - Witness myoclonic jerking concerning for seizures with anoxic brain injury  - vEEG with abundant myoclonic seizures in the setting of a severe diffuse/multifocal cerebral dysfunction which can be seen in the setting of sedative effect or due to anoxia  - s/p valium 10 Q4H   - Continue on keppra   - Continue on klonopin 2mg TID   - Continue on dilaudid PRN     CARDIOLOGY  # Shock state likely hemorrhagic vs vasoplegia  - Acute renal bleed noted requiring multiple transfusions and pressors in ICU.   - TTE 3/22 with EF 63 with normal LVRVSF with PASP 49  - Weaned off pressors in ICU and complicated by hypertension.   - Started on norvasc 5, however dc'ed as BP improved on opioids   - Monitor BP     # Autonomic vs pain  - Mild HTN and tachypnea noted likely pain induced vs autonomia?   - Continue on dilaudid pushes as needed     PULMONARY  # Respiratory failure   - Presented with SCC and hypoxia likely from anemia with no signs of acute chest, however noted with PEA with hemorrhagic shock post renal bx requiring intubation.   - Prolonged intubation and s/p 7 PORTEX trach on 4/4   - Continue on vent 20/460/6/30   - Continue on nebs.   - Continue on dilaudid PRN     GI  # Shock liver vs cirrhosis with sickle cell   - Transaminitis noted in ICU and thought to be second to shock liver likely second to hemorrhagic shock vs cardiac arrest, however LFTs remains elevated with acute on chronic hyperbilirubinemia.   - US ABD suggestive of early cirrhotic changes, cholecystectomy, and CBD 9mm   - CT AP with cirrhosis   - Trend LFTs and bili     # GIB  - Anemia with GIB noted in ICU   - s/p EGD 4/11 with cauterization of gastric ulcer & hemostatic spraying of three duodenal ulcers. Gastric ulcer likely due to NGT trauma.   - Continue on PPI BID   - Monitor stools and HH     # Oropharyngeal dysphagia   - GI unable to place PEG due to hepatomegaly.   - IR unable to place PEG second to no safe window   - s/p open G TUBE placement on 4/18   - Continue on TF    RENAL  # Acute renal failure and HAGMA second to shock state   - Scr on admission was 1.25 (3/15/25) and increased to 6s while in ICU and s/p HD 3/24-3/29 and then again on 4/4.   - L SHILE removed on 4/15   - CRE slowly improving but mild acidosis remains   - Continue on HCO3 1300 TID tabs for now   - Continue with daniel for now  - Monitor renal function, acidosis, and UOP     # L renal mass bx c/b hematoma   - Renal bx as below on 3/19  - Hemorrhagic shock and PEA arrest on 3/20   - Renal US with large L renal subcapsular hematoma.    - IR left renal angiogram and embolization on 3/20     # Hypernatremia  - s/p D5W   - Continue on  Q6H   - Monitor sodium     # Hyperphosphatemia   - Phos elevation likely second to renal failure   - PTH elevated, however Phos slowly down trending   - Monitor phos    INFECTIOUS DISEASE   # VAP   - Post arrest noted with concern for aspiration and VAP in ICU   - Flu, SCx, BAL, BCx and UCx negative   - MRSA PCR with MSSA and completed bactroban in ICU   - Completed aztreonam (3/22-3/27) then casey (3/27-3/31) and vanco by dose in ICU   - CXR in ICU with RUL consolidation and completed recurrent casey course in ICU (4/2-4/7).   - Monitor off ABX    HEMATOLOGIC  # Anemia with SCC vs L kidney bleed vs GIB   - Baseline hemoglobin outpatient ~6.0-7.0 and sent in for anemia down to 5.3 without signs of bleeding and more likely SCC.    - Transfusion given on admission and improved back to baseline.   - Course complicated by hemorrhagic shock from left renal hematoma and HH down to 3.4.  - s/p 10U PRBC total while in ICU   - s/p PRBC pre-GTUBE with HH increased to 8.0.   - HH dropping post op down to 7.0 > 6.2, however no acute signs of bleeding from surgical site or and/ or signs of sickling.   - HH more likely trending back to baseline, however will check CT AP to rule out intra-abdominal bleed.    - Monitor HH     # SCD   - Discontinued deferasirox due to current renal failure   - Monitor Sickle labs QD (CBC with Diff, Retic, BMP, Hepatic Function, LDH and Hapto, and VBG).     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   - Heparin GTT held for G TUBE  - Hold resuming and monitor HH  - LUE precautions     # LUE arm infiltration   - HCO3 GTT infilitration in ICU   - Seen by hand sx and no compartment syndrome concern   - Monitor for now     # RUE blood infiltration   - RUE blood transfusion infiltration noted on 4/17 with area of ecchymosis   - RPT DOPPLER with known R IJ DVT, however no upper arm thrombus or issues in area of infiltration  - Monitor site for now    ENDOCRINE   # Glycemic control   - Monitor BG   - No hx of DM2     SKIN   - Wound care reccs appreciated    ETHICS   - FULL CODE     DISPO - TBD 45 YO M with PMHx of sickle cell disease with prior acute chest syndrome requiring transfusion and exchange in the past (last 12/2024), iron overload, gout, HTN, and RUE DVT in 12/2024 on eliquis who presented on 3/15 by his hematologist second to anemia and hypoxia, down to 5.3 from baseline 7-8, with concern for vaso-occlusive crisis. While on medicine, anemia improved post transfusion and continued on SCC pain control PCA. Patient noted with left renal mass during prior admission and s/p L ureteroscopy with biopsy and stent placement on 3/19. Course complicated with hypoglycemia on 3/20 with RRT x 2 and found with severe anemia to 3.4 with hemorrhagic shock second to left perirenal and subcapsular hemorrhage and ultimately PEA arrest. ROSC achieved and transferred to MICU. s/p IR emobilization and course complicated anoxic brain injury, myoclonic jerks, GIB, MATA, HAGMA, DVTs (R IJ and LUE brachial), dysphagia, cirrhosis, and prolonged vent time s/p trach on 4/4. Transferred to [facility] on 4/18.     NEUROLOGY  # Anoxic brain injury   - s/p cardiac arrest on 3/20 with ROSC in 4 minutes   - CT HEAD post arrest with diffuse sulcal effacement with increased intracranial pressure, and few patchy foci of cortical blurring concern for HIE  - MRI brain post arrest with with diffuse global abnormal supratentorial cortical restricted diffusion consistent with global hypoxic injury   - Monitor for now    # Seizures vs myoclonic jerks   - Witness myoclonic jerking concerning for seizures with anoxic brain injury  - vEEG with abundant myoclonic seizures in the setting of a severe diffuse/multifocal cerebral dysfunction which can be seen in the setting of sedative effect or due to anoxia  - s/p valium 10 Q4H   - Continue on keppra   - Continue on klonopin 2mg TID   - Continue on dilaudid PRN     CARDIOLOGY  # Shock state likely hemorrhagic vs vasoplegia  - Acute renal bleed noted requiring multiple transfusions and pressors in ICU.   - TTE 3/22 with EF 63 with normal LVRVSF with PASP 49  - Weaned off pressors in ICU and complicated by hypertension.   - Started on norvasc 5, however dc'ed as BP improved on opioids   - Monitor BP     # Autonomic vs pain  - Mild HTN and tachypnea noted likely pain induced vs autonomia?   - Continue on dilaudid pushes as needed     PULMONARY  # Respiratory failure   - Presented with SCC and hypoxia likely from anemia with no signs of acute chest, however noted with PEA with hemorrhagic shock post renal bx requiring intubation.   - Prolonged intubation and s/p 7 PORTEX trach on 4/4   - Continue on vent 20/460/6/30   - Continue on nebs.   - Continue on dilaudid PRN     GI  # Shock liver vs cirrhosis with sickle cell   - Transaminitis noted in ICU and thought to be second to shock liver likely second to hemorrhagic shock vs cardiac arrest, however LFTs remains elevated with acute on chronic hyperbilirubinemia.   - US ABD suggestive of early cirrhotic changes, cholecystectomy, and CBD 9mm   - CT AP with cirrhosis   - Trend LFTs and bili     # GIB  - Anemia with GIB noted in ICU   - s/p EGD 4/11 with cauterization of gastric ulcer & hemostatic spraying of three duodenal ulcers. Gastric ulcer likely due to NGT trauma.   - Continue on PPI BID   - Monitor stools and HH     # Oropharyngeal dysphagia   - GI unable to place PEG due to hepatomegaly.   - IR unable to place PEG second to no safe window   - s/p open G TUBE placement on 4/18   - Continue on TF    RENAL  # Acute renal failure and HAGMA second to shock state   - Scr on admission was 1.25 (3/15/25) and increased to 6s while in ICU and s/p HD 3/24-3/29 and then again on 4/4.   - L SHILE removed on 4/15   - CRE slowly improving but mild acidosis remains   - Continue on HCO3 1300 TID tabs for now   - Continue with daniel for now  - Monitor renal function, acidosis, and UOP     # L renal mass bx c/b hematoma   - Renal bx as below on 3/19  - Hemorrhagic shock and PEA arrest on 3/20   - Renal US with large L renal subcapsular hematoma.    - IR left renal angiogram and embolization on 3/20     # Hypernatremia  - s/p D5W   - Continue on  Q6H   - Monitor sodium     # Hyperphosphatemia   - Phos elevation likely second to renal failure   - PTH elevated, however Phos slowly down trending   - Monitor phos    INFECTIOUS DISEASE   # Fever   - Return of fever with TMAX 100.6F overnight   - CXR with RLL infiltrate similar to prior, however slightly improved.   - RVP negative   - UA with pyuria   - PCT 0.5   - Pending SCx and BCx   - WBC coming down and will monitor off ABX.     # VAP   - Post arrest noted with concern for aspiration and VAP in ICU   - Flu, SCx, BAL, BCx and UCx negative   - MRSA PCR with MSSA and completed bactroban in ICU   - Completed aztreonam (3/22-3/27) then casey (3/27-3/31) and vanco by dose in ICU   - CXR in ICU with RUL consolidation and completed recurrent casey course in ICU (4/2-4/7).   - Monitor off ABX    HEMATOLOGIC  # Anemia with SCC vs L kidney bleed vs GIB   - Baseline hemoglobin outpatient ~6.0-7.0 and sent in for anemia down to 5.3 without signs of bleeding and more likely SCC.    - Transfusion given on admission and improved back to baseline.   - Course complicated by hemorrhagic shock from left renal hematoma and HH down to 3.4.  - s/p 10U PRBC total while in ICU   - s/p PRBC pre-GTUBE with HH increased to 8.0.   - HH dropping post op down to 7.0 > 6.2, however no acute signs of bleeding from surgical site or and/ or signs of sickling.   - HH more likely trending back to baseline, however will check CT AP to rule out intra-abdominal bleed.    - Monitor HH     # SCD   - Discontinued deferasirox due to current renal failure   - Monitor Sickle labs QD (CBC with Diff, Retic, BMP, Hepatic Function, LDH and Hapto, and VBG).     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   - Heparin GTT held for G TUBE  - Hold resuming and monitor HH  - LUE precautions     # LUE arm infiltration   - HCO3 GTT infilitration in ICU   - Seen by hand sx and no compartment syndrome concern   - Monitor for now     # RUE blood infiltration   - RUE blood transfusion infiltration noted on 4/17 with area of ecchymosis   - RPT DOPPLER with known R IJ DVT, however no upper arm thrombus or issues in area of infiltration  - Monitor site for now    ENDOCRINE   # Glycemic control   - Monitor BG   - No hx of DM2     SKIN   - Wound care reccs appreciated    ETHICS   - FULL CODE     DISPO - TBD 45 YO M with PMHx of sickle cell disease with prior acute chest syndrome requiring transfusion and exchange in the past (last 12/2024), iron overload, gout, HTN, and RUE DVT in 12/2024 on eliquis who presented on 3/15 by his hematologist second to anemia and hypoxia, down to 5.3 from baseline 7-8, with concern for vaso-occlusive crisis. While on medicine, anemia improved post transfusion and continued on SCC pain control PCA. Patient noted with left renal mass during prior admission and s/p L ureteroscopy with biopsy and stent placement on 3/19. Course complicated with hypoglycemia on 3/20 with RRT x 2 and found with severe anemia to 3.4 with hemorrhagic shock second to left perirenal and subcapsular hemorrhage and ultimately PEA arrest. ROSC achieved and transferred to MICU. s/p IR emobilization and course complicated anoxic brain injury, myoclonic jerks, GIB, MATA, HAGMA, DVTs (R IJ and LUE brachial), dysphagia, cirrhosis, and prolonged vent time s/p trach on 4/4. Transferred to [facility] on 4/18.     NEUROLOGY  # Anoxic brain injury   - s/p cardiac arrest on 3/20 with ROSC in 4 minutes   - CT HEAD post arrest with diffuse sulcal effacement with increased intracranial pressure, and few patchy foci of cortical blurring concern for HIE  - MRI brain post arrest with with diffuse global abnormal supratentorial cortical restricted diffusion consistent with global hypoxic injury   - Monitor for now    # Seizures vs myoclonic jerks   - Witness myoclonic jerking concerning for seizures with anoxic brain injury  - vEEG with abundant myoclonic seizures in the setting of a severe diffuse/multifocal cerebral dysfunction which can be seen in the setting of sedative effect or due to anoxia  - s/p valium 10 Q4H   - Continue on keppra   - Continue on klonopin 2mg TID   - Continue on dilaudid PRN     CARDIOLOGY  # Shock state likely hemorrhagic vs vasoplegia  - Acute renal bleed noted requiring multiple transfusions and pressors in ICU.   - TTE 3/22 with EF 63 with normal LVRVSF with PASP 49  - Weaned off pressors in ICU and complicated by hypertension.   - Started on norvasc 5, however dc'ed as BP improved on opioids   - Monitor BP     # Autonomic vs pain  - Mild HTN and tachypnea noted likely pain induced vs autonomia?   - Continue on dilaudid pushes as needed     PULMONARY  # Respiratory failure   - Presented with SCC and hypoxia likely from anemia with no signs of acute chest, however noted with PEA with hemorrhagic shock post renal bx requiring intubation.   - Prolonged intubation and s/p 7 PORTEX trach on 4/4   - Continue on vent 20/460/6/30   - BL LL atelectasis and continue on nebs with chest PT  - PIPs normal and holding IPV for now  - Continue on dilaudid PRN     GI  # Shock liver vs cirrhosis with sickle cell   - Transaminitis noted in ICU and thought to be second to shock liver likely second to hemorrhagic shock vs cardiac arrest, however LFTs remains elevated with acute on chronic hyperbilirubinemia.   - US ABD suggestive of early cirrhotic changes, cholecystectomy, and CBD 9mm   - CT AP with cirrhosis   - Trend LFTs and bili     # GIB  - Anemia with GIB noted in ICU   - s/p EGD 4/11 with cauterization of gastric ulcer & hemostatic spraying of three duodenal ulcers. Gastric ulcer likely due to NGT trauma.   - Continue on PPI BID   - Monitor stools and HH     # Oropharyngeal dysphagia   - GI unable to place PEG due to hepatomegaly.   - IR unable to place PEG second to no safe window   - s/p open G TUBE placement on 4/18   - Continue on TF    RENAL  # Acute renal failure and HAGMA second to shock state   - Scr on admission was 1.25 (3/15/25) and increased to 6s while in ICU and s/p HD 3/24-3/29 and then again on 4/4.   - L SHILE removed on 4/15   - CRE slowly improving but mild acidosis remains   - Continue on HCO3 1300 TID tabs for now   - Continue with daniel for now  - Monitor renal function, acidosis, and UOP     # L renal mass bx c/b hematoma   - Renal bx as below on 3/19  - Hemorrhagic shock and PEA arrest on 3/20   - Renal US with large L renal subcapsular hematoma.    - IR left renal angiogram and embolization on 3/20   - CT 4/6 with unchanged large left subcapsular and perinephric hematoma.  - Monitor for now    # Hypernatremia  - s/p D5W   - Continue on  Q6H   - Monitor sodium     # Hyperphosphatemia   - Phos elevation likely second to renal failure   - PTH elevated, however Phos slowly down trending   - Monitor phos    INFECTIOUS DISEASE   # Fever   - Return of fever with TMAX 100.6F overnight   - CXR with RLL infiltrate similar to prior, however slightly improved.   - RVP negative   - UA with pyuria   - PCT 0.5   - Pending SCx and BCx   - WBC coming down and will monitor off ABX.     # VAP   - Post arrest noted with concern for aspiration and VAP in ICU   - Flu, SCx, BAL, BCx and UCx negative   - MRSA PCR with MSSA and completed bactroban in ICU   - Completed aztreonam (3/22-3/27) then casey (3/27-3/31) and vanco by dose in ICU   - CXR in ICU with RUL consolidation and completed recurrent casey course in ICU (4/2-4/7).   - Monitor off ABX    HEMATOLOGIC  # Anemia with SCC vs L kidney bleed vs GIB   - Baseline hemoglobin outpatient ~6.0-7.0 and sent in for anemia down to 5.3 without signs of bleeding and more likely SCC.    - Transfusion given on admission and improved back to baseline.   - Course complicated by hemorrhagic shock from left renal hematoma and HH down to 3.4.  - s/p 10U PRBC total while in ICU   - s/p PRBC pre-GTUBE with HH increased to 8.0.   - HH dropping post op down to 7.0 > 6.2, however no acute signs of bleeding from surgical site or and/ or signs of sickling.   - HH more likely trending back to baseline, however will check CT AP to rule out intra-abdominal bleed.    - Monitor HH     # SCD   - Discontinued deferasirox due to current renal failure   - Monitor Sickle labs QD (CBC with Diff, Retic, BMP, Hepatic Function, LDH and Hapto, and VBG).     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   - Heparin GTT held for G TUBE  - Hold resuming and monitor HH  - LUE precautions     # LUE arm infiltration   - HCO3 GTT infilitration in ICU   - Seen by hand sx and no compartment syndrome concern   - Monitor for now     # RUE blood infiltration   - RUE blood transfusion infiltration noted on 4/17 with area of ecchymosis   - RPT DOPPLER with known R IJ DVT, however no upper arm thrombus or issues in area of infiltration  - Monitor site for now    ENDOCRINE   # Glycemic control   - Monitor BG   - No hx of DM2     SKIN   - Wound care reccs appreciated    ETHICS   - FULL CODE     DISPO - TBD

## 2025-04-20 NOTE — CHART NOTE - NSCHARTNOTEFT_GEN_A_CORE
RCU PA POCUS NOTE     : KENAN Tan     INDICATION: Fever    PROCEDURE:  [ ] LIMITED ECHO  [ x] LIMITED CHEST  [ ] LIMITED RETROPERITONEAL  [ ] LIMITED ABDOMINAL  [ ] LIMITED DVT  [ ] NEEDLE GUIDANCE VASCULAR  [ ] NEEDLE GUIDANCE THORACENTESIS  [ ] NEEDLE GUIDANCE PARACENTESIS  [ ] NEEDLE GUIDANCE PERICARDIOCENTESIS  [ ] OTHER    FINDINGS:  Right sided trace simple pleural effusion   BL LL atelectasis   Images uploaded to [employer]    INTERPRETATION:  Trace right PLEF and BL LL atelectasis    DEMOND Pickens, PANanetteC  Department of Medicine/ RCU  In house [facility]CU [facility] [id number]  In house Medicine Bee[facility] [id number]  Reachable via teams RCU PA POCUS NOTE     : KENAN Tan     INDICATION: Fever    PROCEDURE:  [ ] LIMITED ECHO  [ x] LIMITED CHEST  [ ] LIMITED RETROPERITONEAL  [ ] LIMITED ABDOMINAL  [ ] LIMITED DVT  [ ] NEEDLE GUIDANCE VASCULAR  [ ] NEEDLE GUIDANCE THORACENTESIS  [ ] NEEDLE GUIDANCE PARACENTESIS  [ ] NEEDLE GUIDANCE PERICARDIOCENTESIS  [ ] OTHER    FINDINGS:  Right sided trace simple pleural effusion   BL LL atelectasis   LUQ hypoechoic area likely representing free fluid  Poor identification of spleen or left kidney.   No RUQ or suprapubic free fluid noted.   Images uploaded to [employer].    INTERPRETATION:  Trace right PLEF and BL LL atelectasis  Possible LUQ ascites.     DEMOND Pickens, PA-C  Department of Medicine/ RCU  In house RCU [facility] [id number]  In house Medicine Beeper [id number]  Reachable via teams RCU PA POCUS NOTE     : KENAN Tan     INDICATION: Fever    PROCEDURE:  [ ] LIMITED ECHO  [ x] LIMITED CHEST  [ ] LIMITED RETROPERITONEAL  [ ] LIMITED ABDOMINAL  [ ] LIMITED DVT  [ ] NEEDLE GUIDANCE VASCULAR  [ ] NEEDLE GUIDANCE THORACENTESIS  [ ] NEEDLE GUIDANCE PARACENTESIS  [ ] NEEDLE GUIDANCE PERICARDIOCENTESIS  [ ] OTHER    FINDINGS:  Right sided trace simple pleural effusion   BL LL atelectasis   LUQ hypoechoic area likely representing free fluid no seen on official US on 3/20   Poor identification of spleen or left kidney.   No RUQ or suprapubic free fluid noted.   Images uploaded to [employer].    INTERPRETATION:  Trace right PLEF and BL LL atelectasis  Possible LUQ ascites.     DEMOND Pickens, PA-C  Department of Medicine/ RCU  In house RCU [facility] [id number]  In house Medicine Beeper [id number]  Reachable via teams

## 2025-04-20 NOTE — CHART NOTE - NSCHARTNOTEFT_GEN_A_CORE
RCU PA NOTE     Called to expedite CT AP x 3 with CT tech given concern for anemia to rule out bleed.   RN notified.     DEMOND Pickens, PA-C  Department of Medicine/ RCU  In house [facility] [id number]  In house Medicine Beeper [id number]  Reachable via teams

## 2025-04-20 NOTE — PROGRESS NOTE ADULT - SUBJECTIVE AND OBJECTIVE BOX
RCU PROGRESS NOTE     CHIEF COMPLAINT: Patient is a 44y old  Male who presents with a chief complaint of Referred by Hematologist for low Hg (2025 13:54)      INTERVAL EVENTS:  - Tachycardia remains, however fever spike of 100.6F TMAX overnight and pending infectious work up.   - Sickle labs remains stable and will check repeat hemoglobin electrophoresis.   - Renal function stable, however compensated metabolic acidosis remains and HCO3 tabs increased to 1300.    REVIEW OF SYSTEMS: Seen by bedside during AM rounds and unable to assess ROS as HIE    Mode: AC/ CMV (Assist Control/ Continuous Mandatory Ventilation), RR (machine): 20, TV (machine): 460, FiO2: 30, PEEP: 6, ITime: 0.65, MAP: 11, PIP: 26      OBJECTIVE:  ICU Vital Signs Last 24 Hrs  T(C): 36.4 (2025 04:00), Max: 37.5 (2025 12:00)  T(F): 97.6 (2025 04:00), Max: 99.5 (2025 12:00)  HR: 83 (2025 07:35) (67 - 101)  BP: 156/89 (2025 05:40) (141/86 - 164/93)  BP(mean): 107 (2025 16:00) (102 - 113)  ABP: --  ABP(mean): --  RR: 18 (2025 04:00) (16 - 21)  SpO2: 100% (2025 07:35) (98% - 100%)    O2 Parameters below as of 2025 04:00  Patient On (Oxygen Delivery Method): ventilator    O2 Concentration (%): 30      Mode: AC/ CMV (Assist Control/ Continuous Mandatory Ventilation), RR (machine): 20, TV (machine): 460, FiO2: 30, PEEP: 6, ITime: 0.65, MAP: 11, PIP: 26    04-17 @ 07:01  -  04-18 @ 07:00  --------------------------------------------------------  IN: 621 mL / OUT: 1825 mL / NET: -1204 mL      CAPILLARY BLOOD GLUCOSE  POCT Blood Glucose.: 118 mg/dL (2025 00:26)      HOSPITAL MEDICATIONS:  MEDICATIONS  (STANDING):  albuterol/ipratropium for Nebulization 3 milliLiter(s) Nebulizer every 6 hours  chlorhexidine 2% Cloths 1 Application(s) Topical daily  dextrose 5%. 1000 milliLiter(s) (75 mL/Hr) IV Continuous <Continuous>  diazepam  Injectable 10 milliGRAM(s) IV Push every 4 hours  influenza   Vaccine 0.5 milliLiter(s) IntraMuscular once  levETIRAcetam   Injectable 500 milliGRAM(s) IV Push every 12 hours  pantoprazole  Injectable 40 milliGRAM(s) IV Push every 12 hours  potassium chloride  10 mEq/100 mL IVPB 10 milliEquivalent(s) IV Intermittent every 1 hour    MEDICATIONS  (PRN):      PHYSICAL EXAMINATION  General: NAD   HEENT: Scleral icterus and trach present  Cards: S1/S2, no murmurs   Pulm: Course vent sounds bilaterally. No wheezes.   Abdomen: Soft, nondistended and nontender.   Extremities: No pedal edema. No active SABINO of BL upper and lower extremities. Bicep activation noted with noxious stimuli.   Neurology: Eyes open intermittently but does not follow commands with HIE with no acute focal neurological deficits     LABS:                        8.0    19.25 )-----------( 335      ( 2025 04:43 )             24.1                             6.9    21.19 )-----------( 290      ( 2025 06:00 )             20.8       04-18    148[H]  |  115[H]  |  52[H]  ----------------------------<  111[H]  3.4[L]   |  18[L]  |  2.22[H]    Ca    9.1      2025 04:43  Phos  4.2     04-18  Mg     2.10     04-18      04-19    144  |  114[H]  |  55[H]  ----------------------------<  135[H]  3.9   |  18[L]  |  2.33[H]    Ca    8.6      2025 06:00  Phos  5.0     -  Mg     1.90     -    TPro  6.9  /  Alb  2.6[L]  /  TBili  5.6[H]  /  DBili  4.5[H]  /  AST  93[H]  /  ALT  42[H]  /  AlkPhos  192[H]          TPro  7.9  /  Alb  2.9[L]  /  TBili  6.3[H]  /  DBili  x   /  AST  105[H]  /  ALT  43[H]  /  AlkPhos  159[H]      PT/INR - ( 2025 04:43 )   PT: 21.2 sec;   INR: 1.79 ratio       PTT - ( 2025 04:43 )  PTT:31.2 sec    Urinalysis Basic - ( 2025 04:43 )  Color: x / Appearance: x / SG: x / pH: x  Gluc: 111 mg/dL / Ketone: x  / Bili: x / Urobili: x   Blood: x / Protein: x / Nitrite: x   Leuk Esterase: x / RBC: x / WBC x   Sq Epi: x / Non Sq Epi: x / Bacteria: x        Venous Blood Gas:   @ 00:40  7.43/31/132/21/98.9  VBG Lactate: 1.1      PAST MEDICAL & SURGICAL HISTORY:  Sickle cell anemia      Gout      Deep vein thrombosis (DVT)      Iron overload      Hypertension      History of cholecystectomy      History of removal of Port-a-Cath          FAMILY HISTORY:  Family history of sickle cell trait (Father, Mother)    Patient's father is  (Father)    Patient's mother is  (Mother)        Social History:  Lives alone (15 Mar 2025 09:54)      RADIOLOGY:  [ ] Reviewed and interpreted by me    PULMONARY FUNCTION TESTS:    EKG:

## 2025-04-20 NOTE — PROGRESS NOTE ADULT - NS ATTEND AMEND GEN_ALL_CORE FT
as above:  44y Male with PMH of  Sickle cell disease c/b hx of ACS, DVT, presented with low Hg (5/3) s/p bladder biopsy complicated by hemorrhagic shock, course further complicated by cardiac arrest with multiorgan failure and anoxic brain injury. Pt transferred overnight from MICU.      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     - pantoprazole 40 mg IV bid  ID - off antibiotics at this time   GOC - full code at this time, palliative care has seen patient, signed off as goals are clear. Decision makers are brother and sister, has a large involved family.   Matt Quick MD-Pulmonary   [phone]      rest of plan as above as above: H/H reducing--ct a/p  44y Male with PMH of  Sickle cell disease c/b hx of ACS, DVT, presented with low Hg (5/3) s/p bladder biopsy complicated by hemorrhagic shock, course further complicated by cardiac arrest with multiorgan failure and anoxic brain injury. Pt transferred overnight from MICU.      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   - myoclonus - now resolved/controlled   - continue  Keppra  - continue valium 10 IV q4h - wean off as tolerated, will transition to po benzodiazepine if tolerated   - added medication-dilaudid added 4/18   Respiratory - AHRF s/p trach  - continue PS trials, PC if not tolerated  - continue IPV and trach care  Heme - Sickle cell, hemorrhagic shock secondary to biopsy (resolved), R IJ and L brachial DVTs - not on AC due to GIB and hx of hemorrhagic   - no clear signs of sickle crisis currently    CT a/p (ok by brother)---today hopeful, re-check sickle labs  - c/tm goal  > 6  Onc - 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     - pantoprazole 40 mg IV bid  ID - off antibiotics at this time   GOC - full code at this time, palliative care has seen patient, signed off as goals are clear. Decision makers are brother and sister, has a large involved family.   Matt Quick MD-Pulmonary   [phone]      rest of plan as above

## 2025-04-21 LAB
ALBUMIN SERPL ELPH-MCNC: 2.7 G/DL — LOW (ref 3.3–5)
ALP SERPL-CCNC: 236 U/L — HIGH (ref 40–120)
ALT FLD-CCNC: 35 U/L — SIGNIFICANT CHANGE UP (ref 4–41)
ANION GAP SERPL CALC-SCNC: 10 MMOL/L — SIGNIFICANT CHANGE UP (ref 7–14)
AST SERPL-CCNC: 70 U/L — HIGH (ref 4–40)
BASOPHILS # BLD AUTO: 0.11 K/UL — SIGNIFICANT CHANGE UP (ref 0–0.2)
BASOPHILS NFR BLD AUTO: 0.6 % — SIGNIFICANT CHANGE UP (ref 0–2)
BILIRUB DIRECT SERPL-MCNC: 3.5 MG/DL — HIGH (ref 0–0.3)
BILIRUB INDIRECT FLD-MCNC: 0.9 MG/DL — SIGNIFICANT CHANGE UP (ref 0–1)
BILIRUB SERPL-MCNC: 4.4 MG/DL — HIGH (ref 0.2–1.2)
BLD GP AB SCN SERPL QL: NEGATIVE — SIGNIFICANT CHANGE UP
BLOOD GAS VENOUS COMPREHENSIVE RESULT: SIGNIFICANT CHANGE UP
BUN SERPL-MCNC: 53 MG/DL — HIGH (ref 7–23)
CALCIUM SERPL-MCNC: 9.1 MG/DL — SIGNIFICANT CHANGE UP (ref 8.4–10.5)
CHLORIDE SERPL-SCNC: 115 MMOL/L — HIGH (ref 98–107)
CO2 SERPL-SCNC: 20 MMOL/L — LOW (ref 22–31)
CREAT SERPL-MCNC: 1.98 MG/DL — HIGH (ref 0.5–1.3)
CULTURE RESULTS: ABNORMAL
EGFR: 42 ML/MIN/1.73M2 — LOW
EGFR: 42 ML/MIN/1.73M2 — LOW
EOSINOPHIL # BLD AUTO: 0.9 K/UL — HIGH (ref 0–0.5)
EOSINOPHIL NFR BLD AUTO: 4.8 % — SIGNIFICANT CHANGE UP (ref 0–6)
GLUCOSE BLDC GLUCOMTR-MCNC: 123 MG/DL — HIGH (ref 70–99)
GLUCOSE BLDC GLUCOMTR-MCNC: 127 MG/DL — HIGH (ref 70–99)
GLUCOSE BLDC GLUCOMTR-MCNC: 134 MG/DL — HIGH (ref 70–99)
GLUCOSE SERPL-MCNC: 134 MG/DL — HIGH (ref 70–99)
HAPTOGLOB SERPL-MCNC: 51 MG/DL — SIGNIFICANT CHANGE UP (ref 34–200)
HCT VFR BLD CALC: 20.5 % — CRITICAL LOW (ref 39–50)
HEMOGLOBIN INTERPRETATION: SIGNIFICANT CHANGE UP
HGB A MFR BLD: 78.4 % — LOW (ref 95–97.6)
HGB A2 MFR BLD: 2.4 % — SIGNIFICANT CHANGE UP (ref 2.4–3.5)
HGB BLD-MCNC: 6.6 G/DL — CRITICAL LOW (ref 13–17)
HGB F MFR BLD: 1.6 % — HIGH (ref 0–1.5)
HGB S MFR BLD: 17.6 % — HIGH
IANC: 14.86 K/UL — HIGH (ref 1.8–7.4)
IMM GRANULOCYTES NFR BLD AUTO: 1 % — HIGH (ref 0–0.9)
LDH SERPL L TO P-CCNC: 300 U/L — HIGH (ref 135–225)
LYMPHOCYTES # BLD AUTO: 1.5 K/UL — SIGNIFICANT CHANGE UP (ref 1–3.3)
LYMPHOCYTES # BLD AUTO: 7.9 % — LOW (ref 13–44)
MAGNESIUM SERPL-MCNC: 2 MG/DL — SIGNIFICANT CHANGE UP (ref 1.6–2.6)
MCHC RBC-ENTMCNC: 30 PG — SIGNIFICANT CHANGE UP (ref 27–34)
MCHC RBC-ENTMCNC: 32.2 G/DL — SIGNIFICANT CHANGE UP (ref 32–36)
MCV RBC AUTO: 93.2 FL — SIGNIFICANT CHANGE UP (ref 80–100)
MONOCYTES # BLD AUTO: 1.32 K/UL — HIGH (ref 0–0.9)
MONOCYTES NFR BLD AUTO: 7 % — SIGNIFICANT CHANGE UP (ref 2–14)
NEUTROPHILS # BLD AUTO: 14.86 K/UL — HIGH (ref 1.8–7.4)
NEUTROPHILS NFR BLD AUTO: 78.7 % — HIGH (ref 43–77)
NRBC # BLD AUTO: 0 K/UL — SIGNIFICANT CHANGE UP (ref 0–0)
NRBC # FLD: 0 K/UL — SIGNIFICANT CHANGE UP (ref 0–0)
NRBC BLD AUTO-RTO: 0 /100 WBCS — SIGNIFICANT CHANGE UP (ref 0–0)
PHOSPHATE SERPL-MCNC: 3.7 MG/DL — SIGNIFICANT CHANGE UP (ref 2.5–4.5)
PLATELET # BLD AUTO: 343 K/UL — SIGNIFICANT CHANGE UP (ref 150–400)
POTASSIUM SERPL-MCNC: 4 MMOL/L — SIGNIFICANT CHANGE UP (ref 3.5–5.3)
POTASSIUM SERPL-SCNC: 4 MMOL/L — SIGNIFICANT CHANGE UP (ref 3.5–5.3)
PROT SERPL-MCNC: 7.3 G/DL — SIGNIFICANT CHANGE UP (ref 6–8.3)
RBC # BLD: 2.2 M/UL — LOW (ref 4.2–5.8)
RBC # BLD: 2.2 M/UL — LOW (ref 4.2–5.8)
RBC # FLD: 18.5 % — HIGH (ref 10.3–14.5)
RETICS #: 70 K/UL — SIGNIFICANT CHANGE UP (ref 25–125)
RETICS/RBC NFR: 3.2 % — HIGH (ref 0.5–2.5)
RH IG SCN BLD-IMP: POSITIVE — SIGNIFICANT CHANGE UP
SODIUM SERPL-SCNC: 145 MMOL/L — SIGNIFICANT CHANGE UP (ref 135–145)
SPECIMEN SOURCE: SIGNIFICANT CHANGE UP
WBC # BLD: 18.87 K/UL — HIGH (ref 3.8–10.5)
WBC # FLD AUTO: 18.87 K/UL — HIGH (ref 3.8–10.5)

## 2025-04-21 PROCEDURE — 99233 SBSQ HOSP IP/OBS HIGH 50: CPT

## 2025-04-21 PROCEDURE — 99232 SBSQ HOSP IP/OBS MODERATE 35: CPT

## 2025-04-21 RX ORDER — HYDROMORPHONE/SOD CHLOR,ISO/PF 2 MG/10 ML
0.5 SYRINGE (ML) INJECTION ONCE
Refills: 0 | Status: DISCONTINUED | OUTPATIENT
Start: 2025-04-21 | End: 2025-04-21

## 2025-04-21 RX ORDER — ACETAMINOPHEN 500 MG/5ML
1000 LIQUID (ML) ORAL ONCE
Refills: 0 | Status: COMPLETED | OUTPATIENT
Start: 2025-04-21 | End: 2025-04-21

## 2025-04-21 RX ADMIN — Medication 1300 MILLIGRAM(S): at 13:34

## 2025-04-21 RX ADMIN — Medication 400 MILLIGRAM(S): at 17:46

## 2025-04-21 RX ADMIN — Medication 0.5 MILLIGRAM(S): at 17:27

## 2025-04-21 RX ADMIN — Medication 1000 MILLIGRAM(S): at 18:16

## 2025-04-21 RX ADMIN — Medication 40 MILLIGRAM(S): at 18:35

## 2025-04-21 RX ADMIN — IPRATROPIUM BROMIDE AND ALBUTEROL SULFATE 3 MILLILITER(S): .5; 2.5 SOLUTION RESPIRATORY (INHALATION) at 15:36

## 2025-04-21 RX ADMIN — CLONAZEPAM 2 MILLIGRAM(S): 0.5 TABLET ORAL at 05:35

## 2025-04-21 RX ADMIN — IPRATROPIUM BROMIDE AND ALBUTEROL SULFATE 3 MILLILITER(S): .5; 2.5 SOLUTION RESPIRATORY (INHALATION) at 09:32

## 2025-04-21 RX ADMIN — Medication 40 MILLIGRAM(S): at 06:04

## 2025-04-21 RX ADMIN — CLONAZEPAM 2 MILLIGRAM(S): 0.5 TABLET ORAL at 13:31

## 2025-04-21 RX ADMIN — Medication 15 MILLILITER(S): at 17:28

## 2025-04-21 RX ADMIN — Medication 1300 MILLIGRAM(S): at 21:18

## 2025-04-21 RX ADMIN — IPRATROPIUM BROMIDE AND ALBUTEROL SULFATE 3 MILLILITER(S): .5; 2.5 SOLUTION RESPIRATORY (INHALATION) at 03:09

## 2025-04-21 RX ADMIN — CLONAZEPAM 2 MILLIGRAM(S): 0.5 TABLET ORAL at 21:15

## 2025-04-21 RX ADMIN — LEVETIRACETAM 500 MILLIGRAM(S): 10 INJECTION, SOLUTION INTRAVENOUS at 17:28

## 2025-04-21 RX ADMIN — IPRATROPIUM BROMIDE AND ALBUTEROL SULFATE 3 MILLILITER(S): .5; 2.5 SOLUTION RESPIRATORY (INHALATION) at 21:06

## 2025-04-21 RX ADMIN — Medication 1300 MILLIGRAM(S): at 05:36

## 2025-04-21 RX ADMIN — Medication 15 MILLILITER(S): at 05:36

## 2025-04-21 RX ADMIN — LEVETIRACETAM 500 MILLIGRAM(S): 10 INJECTION, SOLUTION INTRAVENOUS at 05:36

## 2025-04-21 RX ADMIN — Medication 1 APPLICATION(S): at 11:05

## 2025-04-21 NOTE — PROGRESS NOTE ADULT - NS ATTEND AMEND GEN_ALL_CORE FT
44 year old male with a history of SSD, UE DVT, gout, HTN, with L renal mass admitted with acute anemia/ sickle cell crisis and evaluation of his left renal mass s/p left ureteroscopy and biopsy (3/19/25) with 24 hour post operative course complicated by rapid responses for hypoglycemia c/b cardiac arrest x 4 minutes CPR/1 epi with ROSC, transferred to MICU for further care     Post rosc found to be profoundly anemic, acidemic, with multiorgan dysfunction overall concerning for acute hemorrhagic shock.   Now with anoxia on CTH as well as MRI. Also with myclonus.  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     Patient after long GOC with family is now s/p trach and peg. H/H low but stable in setting of SSD. CT without source of bleeding. No longer requiring HD.     # Cardiac arrest  # Anoxic encephalopathy  # Myoclonus  # Anemia/SSD  # Transaminitis  # MATA 2/2 to ATN  # DVT  # Hyperbilirubinemia, Early cirrhosis?  # Urothelial Cancer.   - Cardiac arrest, short down time but with likely anoxic injury and multiorgan dysfunction in setting of hemorrhage, time of arrest Hg was 3 and hypoglycemia  - Repeat CT and MRI showing diffuse anoxia. Clinically doing poorly. Simulation myoclonus. C/W PO benzos as tolerated. PRN opoids.   - C/W vent, patient unlikely to be wean from the ventilator.   - Transfuse PRN, s/p embolization by IR. Hematoma stable on CT scan. Not tolerating full a/c.   - DVT in the upper ext likely related with catheter.  - SSD, monitor hemolysis labs. SS labs  - LFT overall downtrending. continue to mointor.   - MATA. s/p HD. Monitor urine output. Trend Cr.   - Monitor off abx for now. S/p multiple courses of abx.   - Hand lesions. Initially consulted vascular now vascular recommending plastics/hand--> no surgical interventions.   - Biopsy showing Non-invasive papillary urothelial carcinoma. Not a candidate for DMT per Onc.   - DVT ppx- SCD for now.   - Dispo- full code.

## 2025-04-21 NOTE — PROGRESS NOTE ADULT - SUBJECTIVE AND OBJECTIVE BOX
[facility] Division of Kidney Diseases & Hypertension  FOLLOW UP NOTE  [phone]--------------------------------------------------------------------------------    Chief Complaint: MATA    24 hour events/subjective: Pt. seen and examined earlier this morning. Pt. remains on mechanical ventilator via trach collar. Unable to obtain ROS due to clinical status.     PAST HISTORY  --------------------------------------------------------------------------------  No significant changes to PMH, PSH, FHx, SHx, unless otherwise noted    ALLERGIES & MEDICATIONS  --------------------------------------------------------------------------------  Allergies    penicillin (Pruritus)  hydroxyurea (Other)  piperacillin-tazobactam (Urticaria)  ceftriaxone (Anaphylaxis)    Intolerances    Standing Inpatient Medications  albuterol/ipratropium for Nebulization 3 milliLiter(s) Nebulizer every 6 hours  chlorhexidine 0.12% Liquid 15 milliLiter(s) Oral Mucosa two times a day  chlorhexidine 2% Cloths 1 Application(s) Topical daily  clonazePAM  Tablet 2 milliGRAM(s) Oral every 8 hours  influenza   Vaccine 0.5 milliLiter(s) IntraMuscular once  levETIRAcetam  Solution 500 milliGRAM(s) Oral two times a day  pantoprazole   Suspension 40 milliGRAM(s) Oral every 12 hours  sodium bicarbonate 1300 milliGRAM(s) Oral three times a day    PRN Inpatient Medications  HYDROmorphone  Injectable 0.5 milliGRAM(s) IV Push every 4 hours PRN      REVIEW OF SYSTEMS  --------------------------------------------------------------------------------  Unable to obtain ROS, see HPI    VITALS/PHYSICAL EXAM  --------------------------------------------------------------------------------  T(C): 36.8 (04-21-25 @ 08:00), Max: 37.1 (04-20-25 @ 20:00)  HR: 106 (04-21-25 @ 11:23) (102 - 120)  BP: 159/97 (04-21-25 @ 08:00) (124/50 - 159/97)  RR: 20 (04-21-25 @ 08:00) (20 - 24)  SpO2: 100% (04-21-25 @ 11:23) (99% - 100%)  Wt(kg): --    04-20-25 @ 07:01  -  04-21-25 @ 07:00  --------------------------------------------------------  IN: 1400 mL / OUT: 2550 mL / NET: -1150 mL    Physical Exam:  Gen: ill appearing  HEENT: tracheostomy, on mechanical ventilator  Pulm: +rhonchi B/L   CV: S1S2+  Abd: +BS, soft  : Jolie noted   Extremities: +LE edema  Neuro: Unresponsive to verbal stimuli  Skin: Warm    LABS/STUDIES  --------------------------------------------------------------------------------              6.6    18.87 >-----------<  343      [04-21-25 @ 05:30]              20.5     145  |  115  |  53  ----------------------------<  134      [04-21-25 @ 05:30]  4.0   |  20  |  1.98        Ca     9.1     [04-21-25 @ 05:30]      Mg     2.00     [04-21-25 @ 05:30]      Phos  3.7     [04-21-25 @ 05:30]    TPro  7.3  /  Alb  2.7  /  TBili  4.4  /  DBili  3.5  /  AST  70  /  ALT  35  /  AlkPhos  236  [04-21-25 @ 05:30]          [04-21-25 @ 05:30]    Creatinine Trend:  SCr 1.98 [04-21 @ 05:30]  SCr 2.06 [04-20 @ 16:10]  SCr 2.20 [04-20 @ 05:55]  SCr 2.33 [04-19 @ 06:00]  SCr 2.22 [04-18 @ 04:43]    Iron 89, TIBC 208, %sat 43      [04-20-25 @ 05:55]  Ferritin 4720      [04-20-25 @ 05:55]  PTH -- (Ca 8.7)      [04-20-25 @ 05:55]   98

## 2025-04-21 NOTE — PROGRESS NOTE ADULT - SUBJECTIVE AND OBJECTIVE BOX
CHIEF COMPLAINT: Patient is a 44y old  Male who presents with a chief complaint of Referred by Hematologist for low Hg (20 Apr 2025 07:31)      INTERVAL EVENTS: no overnight events     ROS: Seen by bedside during AM rounds     OBJECTIVE:  ICU Vital Signs Last 24 Hrs  T(C): 37.1 (21 Apr 2025 04:00), Max: 37.3 (20 Apr 2025 08:00)  T(F): 98.8 (21 Apr 2025 04:00), Max: 99.1 (20 Apr 2025 08:00)  HR: 104 (21 Apr 2025 07:37) (104 - 120)  BP: 159/71 (21 Apr 2025 04:00) (119/82 - 159/71)  BP(mean): 96 (21 Apr 2025 04:00) (71 - 96)  ABP: --  ABP(mean): --  RR: 21 (21 Apr 2025 04:00) (20 - 24)  SpO2: 100% (21 Apr 2025 07:37) (99% - 100%)    O2 Parameters below as of 21 Apr 2025 04:00  Patient On (Oxygen Delivery Method): ventilator    O2 Concentration (%): 30      Mode: AC/ CMV (Assist Control/ Continuous Mandatory Ventilation), RR (machine): 20, TV (machine): 460, FiO2: 30, PEEP: 6, ITime: 0.7, MAP: 11, PIP: 21    04-20 @ 07:01  -  04-21 @ 07:00  --------------------------------------------------------  IN: 1400 mL / OUT: 2550 mL / NET: -1150 mL      CAPILLARY BLOOD GLUCOSE      POCT Blood Glucose.: 134 mg/dL (21 Apr 2025 01:49)      PHYSICAL EXAM:  General:   HEENT:   Lymph Nodes:  Neck:   Respiratory:   Cardiovascular:   Abdomen:   Extremities:   Skin:   Neurological:  Psychiatry:    Mode: AC/ CMV (Assist Control/ Continuous Mandatory Ventilation)  RR (machine): 20  TV (machine): 460  FiO2: 30  PEEP: 6  ITime: 0.7  MAP: 11  PIP: 21      HOSPITAL MEDICATIONS:  MEDICATIONS  (STANDING):  albuterol/ipratropium for Nebulization 3 milliLiter(s) Nebulizer every 6 hours  chlorhexidine 0.12% Liquid 15 milliLiter(s) Oral Mucosa two times a day  chlorhexidine 2% Cloths 1 Application(s) Topical daily  clonazePAM  Tablet 2 milliGRAM(s) Oral every 8 hours  influenza   Vaccine 0.5 milliLiter(s) IntraMuscular once  levETIRAcetam  Solution 500 milliGRAM(s) Oral two times a day  pantoprazole   Suspension 40 milliGRAM(s) Oral every 12 hours  sodium bicarbonate 1300 milliGRAM(s) Oral three times a day    MEDICATIONS  (PRN):  HYDROmorphone  Injectable 0.5 milliGRAM(s) IV Push every 4 hours PRN dysynchronny      LABS:                        6.6    18.87 )-----------( 343      ( 21 Apr 2025 05:30 )             20.5     04-20    145  |  115[H]  |  54[H]  ----------------------------<  127[H]  4.3   |  17[L]  |  2.06[H]    Ca    8.7      20 Apr 2025 16:10  Phos  4.1     04-20  Mg     2.10     04-20    TPro  7.2  /  Alb  2.5[L]  /  TBili  4.4[H]  /  DBili  x   /  AST  71[H]  /  ALT  33  /  AlkPhos  213[H]  04-20      Urinalysis Basic - ( 20 Apr 2025 16:10 )    Color: x / Appearance: x / SG: x / pH: x  Gluc: 127 mg/dL / Ketone: x  / Bili: x / Urobili: x   Blood: x / Protein: x / Nitrite: x   Leuk Esterase: x / RBC: x / WBC x   Sq Epi: x / Non Sq Epi: x / Bacteria: x        Venous Blood Gas:  04-21 @ 05:30  7.35/37/146/20/98.8  VBG Lactate: 1.5  Venous Blood Gas:  04-20 @ 05:55  7.41/29/174/18/100.0  VBG Lactate: 1.5   CHIEF COMPLAINT: Patient is a 44y old  Male who presents with a chief complaint of Referred by Hematologist for low Hg (20 Apr 2025 07:31)      INTERVAL EVENTS: no overnight events     ROS: Seen by bedside during AM rounds     OBJECTIVE:  ICU Vital Signs Last 24 Hrs  T(C): 37.1 (21 Apr 2025 04:00), Max: 37.3 (20 Apr 2025 08:00)  T(F): 98.8 (21 Apr 2025 04:00), Max: 99.1 (20 Apr 2025 08:00)  HR: 104 (21 Apr 2025 07:37) (104 - 120)  BP: 159/71 (21 Apr 2025 04:00) (119/82 - 159/71)  BP(mean): 96 (21 Apr 2025 04:00) (71 - 96)  ABP: --  ABP(mean): --  RR: 21 (21 Apr 2025 04:00) (20 - 24)  SpO2: 100% (21 Apr 2025 07:37) (99% - 100%)    O2 Parameters below as of 21 Apr 2025 04:00  Patient On (Oxygen Delivery Method): ventilator    O2 Concentration (%): 30      Mode: AC/ CMV (Assist Control/ Continuous Mandatory Ventilation), RR (machine): 20, TV (machine): 460, FiO2: 30, PEEP: 6, ITime: 0.7, MAP: 11, PIP: 21    04-20 @ 07:01  -  04-21 @ 07:00  --------------------------------------------------------  IN: 1400 mL / OUT: 2550 mL / NET: -1150 mL      CAPILLARY BLOOD GLUCOSE      POCT Blood Glucose.: 134 mg/dL (21 Apr 2025 01:49)    PHYSICAL EXAMINATION  General: NAD   HEENT: Scleral icterus and trach present  Cards: S1/S2, no murmurs   Pulm: Course vent sounds bilaterally. No wheezes.   Abdomen: Soft, nondistended and nontender.   Extremities: No pedal edema. No active SABINO of BL upper and lower extremities. Bicep activation noted with noxious stimuli.   Neurology: Eyes open intermittently but does not follow commands with HIE with no acute focal neurological deficits       Mode: AC/ CMV (Assist Control/ Continuous Mandatory Ventilation)  RR (machine): 20  TV (machine): 460  FiO2: 30  PEEP: 6  ITime: 0.7  MAP: 11  PIP: 21      HOSPITAL MEDICATIONS:  MEDICATIONS  (STANDING):  albuterol/ipratropium for Nebulization 3 milliLiter(s) Nebulizer every 6 hours  chlorhexidine 0.12% Liquid 15 milliLiter(s) Oral Mucosa two times a day  chlorhexidine 2% Cloths 1 Application(s) Topical daily  clonazePAM  Tablet 2 milliGRAM(s) Oral every 8 hours  influenza   Vaccine 0.5 milliLiter(s) IntraMuscular once  levETIRAcetam  Solution 500 milliGRAM(s) Oral two times a day  pantoprazole   Suspension 40 milliGRAM(s) Oral every 12 hours  sodium bicarbonate 1300 milliGRAM(s) Oral three times a day    MEDICATIONS  (PRN):  HYDROmorphone  Injectable 0.5 milliGRAM(s) IV Push every 4 hours PRN dysynchronny      LABS:                        6.6    18.87 )-----------( 343      ( 21 Apr 2025 05:30 )             20.5     04-20    145  |  115[H]  |  54[H]  ----------------------------<  127[H]  4.3   |  17[L]  |  2.06[H]    Ca    8.7      20 Apr 2025 16:10  Phos  4.1     04-20  Mg     2.10     04-20    TPro  7.2  /  Alb  2.5[L]  /  TBili  4.4[H]  /  DBili  x   /  AST  71[H]  /  ALT  33  /  AlkPhos  213[H]  04-20      Urinalysis Basic - ( 20 Apr 2025 16:10 )    Color: x / Appearance: x / SG: x / pH: x  Gluc: 127 mg/dL / Ketone: x  / Bili: x / Urobili: x   Blood: x / Protein: x / Nitrite: x   Leuk Esterase: x / RBC: x / WBC x   Sq Epi: x / Non Sq Epi: x / Bacteria: x        Venous Blood Gas:  04-21 @ 05:30  7.35/37/146/20/98.8  VBG Lactate: 1.5  Venous Blood Gas:  04-20 @ 05:55  7.41/29/174/18/100.0  VBG Lactate: 1.5

## 2025-04-21 NOTE — PROGRESS NOTE ADULT - ASSESSMENT
43 YO M with PMHx of sickle cell disease with prior acute chest syndrome requiring transfusion and exchange in the past (last 12/2024), iron overload, gout, HTN, and RUE DVT in 12/2024 on eliquis who presented on 3/15 by his hematologist second to anemia and hypoxia, down to 5.3 from baseline 7-8, with concern for vaso-occlusive crisis. While on medicine, anemia improved post transfusion and continued on SCC pain control PCA. Patient noted with left renal mass during prior admission and s/p L ureteroscopy with biopsy and stent placement on 3/19. Course complicated with hypoglycemia on 3/20 with RRT x 2 and found with severe anemia to 3.4 with hemorrhagic shock second to left perirenal and subcapsular hemorrhage and ultimately PEA arrest. ROSC achieved and transferred to MICU. s/p IR emobilization and course complicated anoxic brain injury, myoclonic jerks, GIB, MATA, HAGMA, DVTs (R IJ and LUE brachial), dysphagia, cirrhosis, and prolonged vent time s/p trach on 4/4. Transferred to [facility] on 4/18.     NEUROLOGY  # Anoxic brain injury   - s/p cardiac arrest on 3/20 with ROSC in 4 minutes   - CT HEAD post arrest with diffuse sulcal effacement with increased intracranial pressure, and few patchy foci of cortical blurring concern for HIE  - MRI brain post arrest with with diffuse global abnormal supratentorial cortical restricted diffusion consistent with global hypoxic injury   - Monitor for now    # Seizures vs myoclonic jerks   - Witness myoclonic jerking concerning for seizures with anoxic brain injury  - vEEG with abundant myoclonic seizures in the setting of a severe diffuse/multifocal cerebral dysfunction which can be seen in the setting of sedative effect or due to anoxia  - s/p valium 10 Q4H   - Continue on keppra   - Continue on klonopin 2mg TID   - Continue on dilaudid PRN     CARDIOLOGY  # Shock state likely hemorrhagic vs vasoplegia  - Acute renal bleed noted requiring multiple transfusions and pressors in ICU.   - TTE 3/22 with EF 63 with normal LVRVSF with PASP 49  - Weaned off pressors in ICU and complicated by hypertension.   - Started on norvasc 5, however dc'ed as BP improved on opioids   - Monitor BP     # Autonomic vs pain  - Mild HTN and tachypnea noted likely pain induced vs autonomia?   - Continue on dilaudid pushes as needed     PULMONARY  # Respiratory failure   - Presented with SCC and hypoxia likely from anemia with no signs of acute chest, however noted with PEA with hemorrhagic shock post renal bx requiring intubation.   - Prolonged intubation and s/p 7 PORTEX trach on 4/4   - Continue on vent 20/460/6/30   - BL LL atelectasis and continue on nebs with chest PT  - PIPs normal and holding IPV for now  - Continue on dilaudid PRN     GI  # Shock liver vs cirrhosis with sickle cell   - Transaminitis noted in ICU and thought to be second to shock liver likely second to hemorrhagic shock vs cardiac arrest, however LFTs remains elevated with acute on chronic hyperbilirubinemia.   - US ABD suggestive of early cirrhotic changes, cholecystectomy, and CBD 9mm   - CT AP with cirrhosis   - Trend LFTs and bili     # GIB  - Anemia with GIB noted in ICU   - s/p EGD 4/11 with cauterization of gastric ulcer & hemostatic spraying of three duodenal ulcers. Gastric ulcer likely due to NGT trauma.   - Continue on PPI BID   - Monitor stools and HH     # Oropharyngeal dysphagia   - GI unable to place PEG due to hepatomegaly.   - IR unable to place PEG second to no safe window   - s/p open G TUBE placement on 4/18   - Continue on TF    RENAL  # Acute renal failure and HAGMA second to shock state   - Scr on admission was 1.25 (3/15/25) and increased to 6s while in ICU and s/p HD 3/24-3/29 and then again on 4/4.   - L SHILE removed on 4/15   - CRE slowly improving but mild acidosis remains   - Continue on HCO3 1300 TID tabs for now   - Continue with daniel for now  - Monitor renal function, acidosis, and UOP     # L renal mass bx c/b hematoma   - Renal bx as below on 3/19  - Hemorrhagic shock and PEA arrest on 3/20   - Renal US with large L renal subcapsular hematoma.    - IR left renal angiogram and embolization on 3/20   - CT 4/6 with unchanged large left subcapsular and perinephric hematoma.  - Monitor for now    # Hypernatremia  - s/p D5W   - Continue on  Q6H   - Monitor sodium     # Hyperphosphatemia   - Phos elevation likely second to renal failure   - PTH elevated, however Phos slowly down trending   - Monitor phos    INFECTIOUS DISEASE   # Fever   - Return of fever with TMAX 100.6F overnight   - CXR with RLL infiltrate similar to prior, however slightly improved.   - RVP negative   - UA with pyuria   - PCT 0.5   - Pending SCx and BCx   - WBC coming down and will monitor off ABX.     # VAP   - Post arrest noted with concern for aspiration and VAP in ICU   - Flu, SCx, BAL, BCx and UCx negative   - MRSA PCR with MSSA and completed bactroban in ICU   - Completed aztreonam (3/22-3/27) then casey (3/27-3/31) and vanco by dose in ICU   - CXR in ICU with RUL consolidation and completed recurrent casey course in ICU (4/2-4/7).   - Monitor off ABX    HEMATOLOGIC  # Anemia with SCC vs L kidney bleed vs GIB   - Baseline hemoglobin outpatient ~6.0-7.0 and sent in for anemia down to 5.3 without signs of bleeding and more likely SCC.    - Transfusion given on admission and improved back to baseline.   - Course complicated by hemorrhagic shock from left renal hematoma and HH down to 3.4.  - s/p 10U PRBC total while in ICU   - s/p PRBC pre-GTUBE with HH increased to 8.0.   - HH dropping post op down to 7.0 > 6.2, however no acute signs of bleeding from surgical site or and/ or signs of sickling.   - HH more likely trending back to baseline, however will check CT AP to rule out intra-abdominal bleed.    - Monitor HH     # SCD   - Discontinued deferasirox due to current renal failure   - Monitor Sickle labs QD (CBC with Diff, Retic, BMP, Hepatic Function, LDH and Hapto, and VBG).     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   - Heparin GTT held for G TUBE  - Hold resuming and monitor HH  - LUE precautions     # LUE arm infiltration   - HCO3 GTT infilitration in ICU   - Seen by hand sx and no compartment syndrome concern   - Monitor for now     # RUE blood infiltration   - RUE blood transfusion infiltration noted on 4/17 with area of ecchymosis   - RPT DOPPLER with known R IJ DVT, however no upper arm thrombus or issues in area of infiltration  - Monitor site for now    ENDOCRINE   # Glycemic control   - Monitor BG   - No hx of DM2     SKIN   - Wound care reccs appreciated    ETHICS   - FULL CODE     DISPO - TBD

## 2025-04-21 NOTE — PROGRESS NOTE ADULT - PROBLEM SELECTOR PLAN 1
Pt. with MAAT in setting of hemorrhagic shock and PEA arrest. Pt. with most likely ATN. Scr on admission was 1.25 (3/15/25). Scr progressively worsened to 6.04 on 3/24/25. Renal US done on 3/20/25 showed large left renal subcapsular hematoma. Pt. received HD from 3/24/25 to 3/29/25 for oliguric kidney failure. Pt. restarted on HD 4/4/25 for metabolic acidosis and uremia. Last HD treatment was on 4/11/25. HD catheter was removed on 4/15/25. Labs from today reviewed. MATA resolving at present. Scr improved to 1.98 today. Pt. non-oliguric with ~1.6L of UOP in last 24 hours. Monitor labs and UOP. Avoid nephrotoxins. Dose medications as per eGFR.     If you have any questions, please feel free to contact me  Muna Hickey  Nephrology  [phone] / [employer] Teams(Preferred)  (After 4 pm or on weekends please page the on-call fellow)

## 2025-04-22 LAB
A1C WITH ESTIMATED AVERAGE GLUCOSE RESULT: 5.5 % — SIGNIFICANT CHANGE UP (ref 4–5.6)
ADD ON TEST-SPECIMEN IN LAB: SIGNIFICANT CHANGE UP
ALBUMIN SERPL ELPH-MCNC: 2.5 G/DL — LOW (ref 3.3–5)
ALBUMIN SERPL ELPH-MCNC: 2.6 G/DL — LOW (ref 3.3–5)
ALP SERPL-CCNC: 303 U/L — HIGH (ref 40–120)
ALP SERPL-CCNC: 311 U/L — HIGH (ref 40–120)
ALT FLD-CCNC: 41 U/L — SIGNIFICANT CHANGE UP (ref 4–41)
ALT FLD-CCNC: 44 U/L — HIGH (ref 4–41)
ANION GAP SERPL CALC-SCNC: 10 MMOL/L — SIGNIFICANT CHANGE UP (ref 7–14)
AST SERPL-CCNC: 88 U/L — HIGH (ref 4–40)
AST SERPL-CCNC: 89 U/L — HIGH (ref 4–40)
BILIRUB DIRECT SERPL-MCNC: 2.8 MG/DL — HIGH (ref 0–0.3)
BILIRUB INDIRECT FLD-MCNC: 1 MG/DL — SIGNIFICANT CHANGE UP (ref 0–1)
BILIRUB SERPL-MCNC: 3.8 MG/DL — HIGH (ref 0.2–1.2)
BILIRUB SERPL-MCNC: 3.8 MG/DL — HIGH (ref 0.2–1.2)
BUN SERPL-MCNC: 54 MG/DL — HIGH (ref 7–23)
CALCIUM SERPL-MCNC: 9 MG/DL — SIGNIFICANT CHANGE UP (ref 8.4–10.5)
CHLORIDE SERPL-SCNC: 113 MMOL/L — HIGH (ref 98–107)
CO2 SERPL-SCNC: 21 MMOL/L — LOW (ref 22–31)
CREAT SERPL-MCNC: 1.86 MG/DL — HIGH (ref 0.5–1.3)
EGFR: 45 ML/MIN/1.73M2 — LOW
EGFR: 45 ML/MIN/1.73M2 — LOW
ESTIMATED AVERAGE GLUCOSE: 111 — SIGNIFICANT CHANGE UP
GLUCOSE BLDC GLUCOMTR-MCNC: 137 MG/DL — HIGH (ref 70–99)
GLUCOSE BLDC GLUCOMTR-MCNC: 139 MG/DL — HIGH (ref 70–99)
GLUCOSE SERPL-MCNC: 135 MG/DL — HIGH (ref 70–99)
HAPTOGLOB SERPL-MCNC: 42 MG/DL — SIGNIFICANT CHANGE UP (ref 34–200)
HCT VFR BLD CALC: 18.2 % — CRITICAL LOW (ref 39–50)
HCT VFR BLD CALC: 21.2 % — LOW (ref 39–50)
HGB BLD-MCNC: 5.9 G/DL — CRITICAL LOW (ref 13–17)
HGB BLD-MCNC: 7 G/DL — CRITICAL LOW (ref 13–17)
LDH SERPL L TO P-CCNC: 325 U/L — HIGH (ref 135–225)
MAGNESIUM SERPL-MCNC: 2 MG/DL — SIGNIFICANT CHANGE UP (ref 1.6–2.6)
MAGNESIUM SERPL-MCNC: 2 MG/DL — SIGNIFICANT CHANGE UP (ref 1.6–2.6)
MCHC RBC-ENTMCNC: 29.6 PG — SIGNIFICANT CHANGE UP (ref 27–34)
MCHC RBC-ENTMCNC: 30.2 PG — SIGNIFICANT CHANGE UP (ref 27–34)
MCHC RBC-ENTMCNC: 32.4 G/DL — SIGNIFICANT CHANGE UP (ref 32–36)
MCHC RBC-ENTMCNC: 33 G/DL — SIGNIFICANT CHANGE UP (ref 32–36)
MCV RBC AUTO: 91.4 FL — SIGNIFICANT CHANGE UP (ref 80–100)
MCV RBC AUTO: 91.5 FL — SIGNIFICANT CHANGE UP (ref 80–100)
NRBC # BLD AUTO: 0 K/UL — SIGNIFICANT CHANGE UP (ref 0–0)
NRBC # BLD AUTO: 0.02 K/UL — HIGH (ref 0–0)
NRBC # FLD: 0 K/UL — SIGNIFICANT CHANGE UP (ref 0–0)
NRBC # FLD: 0.02 K/UL — HIGH (ref 0–0)
NRBC BLD AUTO-RTO: 0 /100 WBCS — SIGNIFICANT CHANGE UP (ref 0–0)
NRBC BLD AUTO-RTO: 0 /100 WBCS — SIGNIFICANT CHANGE UP (ref 0–0)
PHOSPHATE SERPL-MCNC: 3.4 MG/DL — SIGNIFICANT CHANGE UP (ref 2.5–4.5)
PHOSPHATE SERPL-MCNC: 3.4 MG/DL — SIGNIFICANT CHANGE UP (ref 2.5–4.5)
PLATELET # BLD AUTO: 350 K/UL — SIGNIFICANT CHANGE UP (ref 150–400)
PLATELET # BLD AUTO: 368 K/UL — SIGNIFICANT CHANGE UP (ref 150–400)
POTASSIUM SERPL-MCNC: 4.1 MMOL/L — SIGNIFICANT CHANGE UP (ref 3.5–5.3)
POTASSIUM SERPL-SCNC: 4.1 MMOL/L — SIGNIFICANT CHANGE UP (ref 3.5–5.3)
PROT SERPL-MCNC: 7 G/DL — SIGNIFICANT CHANGE UP (ref 6–8.3)
PROT SERPL-MCNC: 7.1 G/DL — SIGNIFICANT CHANGE UP (ref 6–8.3)
RBC # BLD: 1.99 M/UL — LOW (ref 4.2–5.8)
RBC # BLD: 2.32 M/UL — LOW (ref 4.2–5.8)
RBC # FLD: 17.9 % — HIGH (ref 10.3–14.5)
RBC # FLD: 18.6 % — HIGH (ref 10.3–14.5)
RETICS #: 59.4 K/UL — SIGNIFICANT CHANGE UP (ref 25–125)
RETICS/RBC NFR: 3 % — HIGH (ref 0.5–2.5)
SODIUM SERPL-SCNC: 144 MMOL/L — SIGNIFICANT CHANGE UP (ref 135–145)
WBC # BLD: 18.76 K/UL — HIGH (ref 3.8–10.5)
WBC # BLD: 20.73 K/UL — HIGH (ref 3.8–10.5)
WBC # FLD AUTO: 18.76 K/UL — HIGH (ref 3.8–10.5)
WBC # FLD AUTO: 20.73 K/UL — HIGH (ref 3.8–10.5)

## 2025-04-22 PROCEDURE — 99233 SBSQ HOSP IP/OBS HIGH 50: CPT

## 2025-04-22 RX ORDER — ACETAMINOPHEN 500 MG/5ML
1000 LIQUID (ML) ORAL ONCE
Refills: 0 | Status: COMPLETED | OUTPATIENT
Start: 2025-04-22 | End: 2025-04-22

## 2025-04-22 RX ORDER — ACETAMINOPHEN 500 MG/5ML
650 LIQUID (ML) ORAL ONCE
Refills: 0 | Status: DISCONTINUED | OUTPATIENT
Start: 2025-04-22 | End: 2025-04-22

## 2025-04-22 RX ADMIN — Medication 400 MILLIGRAM(S): at 12:51

## 2025-04-22 RX ADMIN — IPRATROPIUM BROMIDE AND ALBUTEROL SULFATE 3 MILLILITER(S): .5; 2.5 SOLUTION RESPIRATORY (INHALATION) at 22:11

## 2025-04-22 RX ADMIN — Medication 15 MILLILITER(S): at 17:28

## 2025-04-22 RX ADMIN — Medication 1300 MILLIGRAM(S): at 21:11

## 2025-04-22 RX ADMIN — Medication 400 MILLIGRAM(S): at 04:10

## 2025-04-22 RX ADMIN — Medication 40 MILLIGRAM(S): at 06:12

## 2025-04-22 RX ADMIN — Medication 1300 MILLIGRAM(S): at 13:10

## 2025-04-22 RX ADMIN — IPRATROPIUM BROMIDE AND ALBUTEROL SULFATE 3 MILLILITER(S): .5; 2.5 SOLUTION RESPIRATORY (INHALATION) at 15:46

## 2025-04-22 RX ADMIN — LEVETIRACETAM 500 MILLIGRAM(S): 10 INJECTION, SOLUTION INTRAVENOUS at 17:28

## 2025-04-22 RX ADMIN — Medication 1300 MILLIGRAM(S): at 05:23

## 2025-04-22 RX ADMIN — Medication 400 MILLIGRAM(S): at 20:11

## 2025-04-22 RX ADMIN — Medication 1000 MILLIGRAM(S): at 21:00

## 2025-04-22 RX ADMIN — IPRATROPIUM BROMIDE AND ALBUTEROL SULFATE 3 MILLILITER(S): .5; 2.5 SOLUTION RESPIRATORY (INHALATION) at 03:05

## 2025-04-22 RX ADMIN — CLONAZEPAM 2 MILLIGRAM(S): 0.5 TABLET ORAL at 21:11

## 2025-04-22 RX ADMIN — CLONAZEPAM 2 MILLIGRAM(S): 0.5 TABLET ORAL at 13:10

## 2025-04-22 RX ADMIN — Medication 0.5 MILLIGRAM(S): at 17:57

## 2025-04-22 RX ADMIN — Medication 1 APPLICATION(S): at 11:23

## 2025-04-22 RX ADMIN — Medication 40 MILLIGRAM(S): at 18:39

## 2025-04-22 RX ADMIN — LEVETIRACETAM 500 MILLIGRAM(S): 10 INJECTION, SOLUTION INTRAVENOUS at 05:22

## 2025-04-22 RX ADMIN — IPRATROPIUM BROMIDE AND ALBUTEROL SULFATE 3 MILLILITER(S): .5; 2.5 SOLUTION RESPIRATORY (INHALATION) at 10:41

## 2025-04-22 RX ADMIN — Medication 1000 MILLIGRAM(S): at 13:21

## 2025-04-22 RX ADMIN — Medication 15 MILLILITER(S): at 05:23

## 2025-04-22 RX ADMIN — CLONAZEPAM 2 MILLIGRAM(S): 0.5 TABLET ORAL at 05:22

## 2025-04-22 RX ADMIN — Medication 0.5 MILLIGRAM(S): at 04:11

## 2025-04-22 NOTE — PROGRESS NOTE ADULT - ASSESSMENT
45 YO M with PMHx of sickle cell disease with prior acute chest syndrome requiring transfusion and exchange in the past (last 12/2024), iron overload, gout, HTN, and RUE DVT in 12/2024 on eliquis who presented on 3/15 by his hematologist second to anemia and hypoxia, down to 5.3 from baseline 7-8, with concern for vaso-occlusive crisis. While on medicine, anemia improved post transfusion and continued on SCC pain control PCA. Patient noted with left renal mass during prior admission and s/p L ureteroscopy with biopsy and stent placement on 3/19. Course complicated with hypoglycemia on 3/20 with RRT x 2 and found with severe anemia to 3.4 with hemorrhagic shock second to left perirenal and subcapsular hemorrhage and ultimately PEA arrest. ROSC achieved and transferred to MICU. s/p IR emobilization and course complicated anoxic brain injury, myoclonic jerks, GIB, MATA, HAGMA, DVTs (R IJ and LUE brachial), dysphagia, cirrhosis, and prolonged vent time s/p trach on 4/4. Transferred to [facility] on 4/18.     NEUROLOGY  # Anoxic brain injury   - s/p cardiac arrest on 3/20 with ROSC in 4 minutes   - CT HEAD post arrest with diffuse sulcal effacement with increased intracranial pressure, and few patchy foci of cortical blurring concern for HIE  - MRI brain post arrest with with diffuse global abnormal supratentorial cortical restricted diffusion consistent with global hypoxic injury   - Monitor for now    # Seizures vs myoclonic jerks   - Witness myoclonic jerking concerning for seizures with anoxic brain injury  - vEEG with abundant myoclonic seizures in the setting of a severe diffuse/multifocal cerebral dysfunction which can be seen in the setting of sedative effect or due to anoxia  - s/p valium 10 Q4H   - Continue on keppra   - Continue on klonopin 2mg TID   - Continue on dilaudid PRN     CARDIOLOGY  # Shock state likely hemorrhagic vs vasoplegia  - Acute renal bleed noted requiring multiple transfusions and pressors in ICU.   - TTE 3/22 with EF 63 with normal LVRVSF with PASP 49  - Weaned off pressors in ICU and complicated by hypertension.   - Started on norvasc 5, however dc'ed as BP improved on opioids   - Monitor BP     # Autonomic vs pain  - Mild HTN and tachypnea noted likely pain induced vs autonomia?   - Continue on dilaudid pushes as needed     PULMONARY  # Respiratory failure   - Presented with SCC and hypoxia likely from anemia with no signs of acute chest, however noted with PEA with hemorrhagic shock post renal bx requiring intubation.   - Prolonged intubation and s/p 7 PORTEX trach on 4/4   - Continue on vent 20/460/6/30   - BL LL atelectasis and continue on nebs with chest PT  - PIPs normal and holding IPV for now  - Continue on dilaudid PRN     GI  # Shock liver vs cirrhosis with sickle cell   - Transaminitis noted in ICU and thought to be second to shock liver likely second to hemorrhagic shock vs cardiac arrest, however LFTs remains elevated with acute on chronic hyperbilirubinemia.   - US ABD suggestive of early cirrhotic changes, cholecystectomy, and CBD 9mm   - CT AP with cirrhosis   - Trend LFTs and bili     # GIB  - Anemia with GIB noted in ICU   - s/p EGD 4/11 with cauterization of gastric ulcer & hemostatic spraying of three duodenal ulcers. Gastric ulcer likely due to NGT trauma.   - Continue on PPI BID   - Monitor stools and HH     # Oropharyngeal dysphagia   - GI unable to place PEG due to hepatomegaly.   - IR unable to place PEG second to no safe window   - s/p open G TUBE placement on 4/18   - Continue on TF    RENAL  # Acute renal failure and HAGMA second to shock state   - Scr on admission was 1.25 (3/15/25) and increased to 6s while in ICU and s/p HD 3/24-3/29 and then again on 4/4.   - L SHILE removed on 4/15   - CRE slowly improving but mild acidosis remains   - Continue on HCO3 1300 TID tabs for now   - Continue with daniel for now  - Monitor renal function, acidosis, and UOP     # L renal mass bx c/b hematoma   - Renal bx as below on 3/19  - Hemorrhagic shock and PEA arrest on 3/20   - Renal US with large L renal subcapsular hematoma.    - IR left renal angiogram and embolization on 3/20   - CT 4/6 with unchanged large left subcapsular and perinephric hematoma.  - Monitor for now    # Hypernatremia  - s/p D5W   - Continue on  Q6H   - Monitor sodium     # Hyperphosphatemia   - Phos elevation likely second to renal failure   - PTH elevated, however Phos slowly down trending   - Monitor phos    INFECTIOUS DISEASE   # Fever   - Return of fever with TMAX 100.6F overnight   - CXR with RLL infiltrate similar to prior, however slightly improved.   - RVP negative   - UA with pyuria   - PCT 0.5   - Pending SCx and BCx   - WBC coming down and will monitor off ABX.     # VAP   - Post arrest noted with concern for aspiration and VAP in ICU   - Flu, SCx, BAL, BCx and UCx negative   - MRSA PCR with MSSA and completed bactroban in ICU   - Completed aztreonam (3/22-3/27) then casey (3/27-3/31) and vanco by dose in ICU   - CXR in ICU with RUL consolidation and completed recurrent casey course in ICU (4/2-4/7).   - Monitor off ABX    HEMATOLOGIC  # Anemia with SCC vs L kidney bleed vs GIB   - Baseline hemoglobin outpatient ~6.0-7.0 and sent in for anemia down to 5.3 without signs of bleeding and more likely SCC.    - Transfusion given on admission and improved back to baseline.   - Course complicated by hemorrhagic shock from left renal hematoma and HH down to 3.4.  - s/p 10U PRBC total while in ICU   - s/p PRBC pre-GTUBE with HH increased to 8.0.   - HH dropping post op down to 7.0 > 6.2, however no acute signs of bleeding from surgical site or and/ or signs of sickling.   - HH more likely trending back to baseline, however will check CT AP to rule out intra-abdominal bleed.    - Monitor HH     # SCD   - Discontinued deferasirox due to current renal failure   - Monitor Sickle labs QD (CBC with Diff, Retic, BMP, Hepatic Function, LDH and Hapto, and VBG).     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   - Heparin GTT held for G TUBE  - Hold resuming and monitor HH  - LUE precautions     # LUE arm infiltration   - HCO3 GTT infilitration in ICU   - Seen by hand sx and no compartment syndrome concern   - Monitor for now     # RUE blood infiltration   - RUE blood transfusion infiltration noted on 4/17 with area of ecchymosis   - RPT DOPPLER with known R IJ DVT, however no upper arm thrombus or issues in area of infiltration  - Monitor site for now    ENDOCRINE   # Glycemic control   - Monitor BG   - No hx of DM2     SKIN   - Wound care reccs appreciated    ETHICS   - FULL CODE     DISPO - TBD 45 YO M with PMHx of sickle cell disease with prior acute chest syndrome requiring transfusion and exchange in the past (last 12/2024), iron overload, gout, HTN, and RUE DVT in 12/2024 on eliquis who presented on 3/15 by his hematologist second to anemia and hypoxia, down to 5.3 from baseline 7-8, with concern for vaso-occlusive crisis. While on medicine, anemia improved post transfusion and continued on SCC pain control PCA. Patient noted with left renal mass during prior admission and s/p L ureteroscopy with biopsy and stent placement on 3/19. Course complicated with hypoglycemia on 3/20 with RRT x 2 and found with severe anemia to 3.4 with hemorrhagic shock second to left perirenal and subcapsular hemorrhage and ultimately PEA arrest. ROSC achieved and transferred to MICU. s/p IR emobilization and course complicated anoxic brain injury, myoclonic jerks, GIB, MATA, HAGMA, DVTs (R IJ and LUE brachial), dysphagia, cirrhosis, and prolonged vent time s/p trach on 4/4. Transferred to [facility] on 4/18.     NEUROLOGY  # Anoxic brain injury   - s/p cardiac arrest on 3/20 with ROSC in 4 minutes   - CT HEAD post arrest with diffuse sulcal effacement with increased intracranial pressure, and few patchy foci of cortical blurring concern for HIE  - MRI brain post arrest with with diffuse global abnormal supratentorial cortical restricted diffusion consistent with global hypoxic injury   - Monitor for now    # Seizures vs myoclonic jerks   - Witness myoclonic jerking concerning for seizures with anoxic brain injury  - vEEG with abundant myoclonic seizures in the setting of a severe diffuse/multifocal cerebral dysfunction which can be seen in the setting of sedative effect or due to anoxia  - s/p valium 10 Q4H   - Continue on keppra   - Continue on klonopin 2mg TID   - Continue on dilaudid PRN     CARDIOLOGY  # Shock state likely hemorrhagic vs vasoplegia  - Acute renal bleed noted requiring multiple transfusions and pressors in ICU.   - TTE 3/22 with EF 63 with normal LVRVSF with PASP 49  - Weaned off pressors in ICU and complicated by hypertension.   - Started on norvasc 5, however dc'ed as BP improved on opioids   - Monitor BP     # Autonomic vs pain  - Mild HTN and tachypnea noted likely pain induced vs autonomia?   - Continue on dilaudid pushes as needed     PULMONARY  # Respiratory failure   - Presented with SCC and hypoxia likely from anemia with no signs of acute chest, however noted with PEA with hemorrhagic shock post renal bx requiring intubation.   - Prolonged intubation and s/p 7 PORTEX trach on 4/4   - Continue on vent 20/460/6/30   - BL LL atelectasis and continue on nebs with chest PT  - PIPs normal and holding IPV for now  - Continue on dilaudid PRN     GI  # Shock liver vs cirrhosis with sickle cell   - Transaminitis noted in ICU and thought to be second to shock liver likely second to hemorrhagic shock vs cardiac arrest, however LFTs remains elevated with acute on chronic hyperbilirubinemia.   - US ABD suggestive of early cirrhotic changes, cholecystectomy, and CBD 9mm   - CT AP with cirrhosis   - Trend LFTs and bili     # GIB  - Anemia with GIB noted in ICU   - s/p EGD 4/11 with cauterization of gastric ulcer & hemostatic spraying of three duodenal ulcers. Gastric ulcer likely due to NGT trauma.   - Continue on PPI BID   - Monitor stools and HH     # Oropharyngeal dysphagia   - GI unable to place PEG due to hepatomegaly.   - IR unable to place PEG second to no safe window   - s/p open G TUBE placement on 4/18   - Continue on TF    RENAL  # Acute renal failure and HAGMA second to shock state   - Scr on admission was 1.25 (3/15/25) and increased to 6s while in ICU and s/p HD 3/24-3/29 and then again on 4/4.   - L SHILE removed on 4/15   - CRE slowly improving but mild acidosis remains   - Continue on HCO3 1300 TID tabs for now   - Continue with daniel for now  - Monitor renal function, acidosis, and UOP     # L renal mass bx c/b hematoma   - Renal bx as below on 3/19  - Hemorrhagic shock and PEA arrest on 3/20   - Renal US with large L renal subcapsular hematoma.    - IR left renal angiogram and embolization on 3/20   - CT 4/6 with unchanged large left subcapsular and perinephric hematoma.  - Monitor for now    # Hypernatremia  - s/p D5W   - Continue on  Q6H   - Monitor sodium     # Hyperphosphatemia   - Phos elevation likely second to renal failure   - PTH elevated, however Phos slowly down trending   - Monitor phos    INFECTIOUS DISEASE   # Fever   - Return of fever with TMAX 100.6F overnight   - CXR with RLL infiltrate similar to prior, however slightly improved.   - RVP negative   - UA with pyuria   - PCT 0.5   - Pending SCx and BCx   - WBC coming down and will monitor off ABX.     # VAP   - Post arrest noted with concern for aspiration and VAP in ICU   - Flu, SCx, BAL, BCx and UCx negative   - MRSA PCR with MSSA and completed bactroban in ICU   - Completed aztreonam (3/22-3/27) then casey (3/27-3/31) and vanco by dose in ICU   - CXR in ICU with RUL consolidation and completed recurrent casey course in ICU (4/2-4/7).   - Monitor off ABX    HEMATOLOGIC  # Anemia with SCC vs L kidney bleed vs GIB   - Baseline hemoglobin outpatient ~6.0-7.0 and sent in for anemia down to 5.3 without signs of bleeding and more likely SCC.    - Transfusion given on admission and improved back to baseline.   - Course complicated by hemorrhagic shock from left renal hematoma and HH down to 3.4.  - s/p 10U PRBC total while in ICU   - s/p PRBC pre-GTUBE with HH increased to 8.0.   - HH dropping post op down to 7.0 > 5.9 , however no acute signs of bleeding from surgical site or and/ or signs of sickling.   - HH more likely trending back to baseline, however will check CT AP to rule out intra-abdominal bleed.    - Monitor HH     # SCD   - Discontinued deferasirox due to current renal failure   - Monitor Sickle labs QD (CBC with Diff, Retic, BMP, Hepatic Function, LDH and Hapto, and VBG).     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   - Heparin GTT held for G TUBE  - Hold resuming and monitor HH  - LUE precautions     # LUE arm infiltration   - HCO3 GTT infilitration in ICU   - Seen by hand sx and no compartment syndrome concern   - Monitor for now     # RUE blood infiltration   - RUE blood transfusion infiltration noted on 4/17 with area of ecchymosis   - RPT DOPPLER with known R IJ DVT, however no upper arm thrombus or issues in area of infiltration  - Monitor site for now    ENDOCRINE   # Glycemic control   - Monitor BG   - No hx of DM2     SKIN   - Wound care reccs appreciated    ETHICS   - FULL CODE     DISPO - TBD 45 YO M with PMHx of sickle cell disease with prior acute chest syndrome requiring transfusion and exchange in the past (last 12/2024), iron overload, gout, HTN, and RUE DVT in 12/2024 on eliquis who presented on 3/15 by his hematologist second to anemia and hypoxia, down to 5.3 from baseline 7-8, with concern for vaso-occlusive crisis. While on medicine, anemia improved post transfusion and continued on SCC pain control PCA. Patient noted with left renal mass during prior admission and s/p L ureteroscopy with biopsy and stent placement on 3/19. Course complicated with hypoglycemia on 3/20 with RRT x 2 and found with severe anemia to 3.4 with hemorrhagic shock second to left perirenal and subcapsular hemorrhage and ultimately PEA arrest. ROSC achieved and transferred to MICU. s/p IR emobilization and course complicated anoxic brain injury, myoclonic jerks, GIB, MATA, HAGMA, DVTs (R IJ and LUE brachial), dysphagia, cirrhosis, and prolonged vent time s/p trach on 4/4. Transferred to [facility] on 4/18.     NEUROLOGY  # Anoxic brain injury   - s/p cardiac arrest on 3/20 with ROSC in 4 minutes   - CT HEAD post arrest with diffuse sulcal effacement with increased intracranial pressure, and few patchy foci of cortical blurring concern for HIE  - MRI brain post arrest with with diffuse global abnormal supratentorial cortical restricted diffusion consistent with global hypoxic injury   - Monitor for now    # Seizures vs myoclonic jerks   - Witness myoclonic jerking concerning for seizures with anoxic brain injury  - vEEG with abundant myoclonic seizures in the setting of a severe diffuse/multifocal cerebral dysfunction which can be seen in the setting of sedative effect or due to anoxia  - s/p valium 10 Q4H   - Continue on keppra   - Continue on klonopin 2mg TID   - Continue on dilaudid PRN     CARDIOLOGY  # Shock state likely hemorrhagic vs vasoplegia  - Acute renal bleed noted requiring multiple transfusions and pressors in ICU.   - TTE 3/22 with EF 63 with normal LVRVSF with PASP 49  - Weaned off pressors in ICU and complicated by hypertension.   - Started on norvasc 5, however dc'ed as BP improved on opioids   - Monitor BP     # Autonomic vs pain  - Mild HTN and tachypnea noted likely pain induced vs autonomia?   - Continue on dilaudid pushes as needed     PULMONARY  # Respiratory failure   - Presented with SCC and hypoxia likely from anemia with no signs of acute chest, however noted with PEA with hemorrhagic shock post renal bx requiring intubation.   - Prolonged intubation and s/p 7 PORTEX trach on 4/4   - Continue on vent 20/460/6/30   - BL LL atelectasis and continue on nebs with chest PT  - PIPs normal and holding IPV for now  - Continue on dilaudid PRN     GI  # Shock liver vs cirrhosis with sickle cell   - Transaminitis noted in ICU and thought to be second to shock liver likely second to hemorrhagic shock vs cardiac arrest, however LFTs remains elevated with acute on chronic hyperbilirubinemia.   - US ABD suggestive of early cirrhotic changes, cholecystectomy, and CBD 9mm   - CT AP with cirrhosis   - Trend LFTs and bili     # GIB  - Anemia with GIB noted in ICU   - s/p EGD 4/11 with cauterization of gastric ulcer & hemostatic spraying of three duodenal ulcers. Gastric ulcer likely due to NGT trauma.   - Continue on PPI BID   - Monitor stools and HH     # Oropharyngeal dysphagia   - GI unable to place PEG due to hepatomegaly.   - IR unable to place PEG second to no safe window   - s/p open G TUBE placement on 4/18   - Continue on TF    RENAL  # Acute renal failure and HAGMA second to shock state   - Scr on admission was 1.25 (3/15/25) and increased to 6s while in ICU and s/p HD 3/24-3/29 and then again on 4/4.   - L SHILE removed on 4/15   - CRE slowly improving but mild acidosis remains   - Continue on HCO3 1300 TID tabs for now   - Continue with daniel for now  - Monitor renal function, acidosis, and UOP     # L renal mass bx c/b hematoma   - Renal bx as below on 3/19  - Hemorrhagic shock and PEA arrest on 3/20   - Renal US with large L renal subcapsular hematoma.    - IR left renal angiogram and embolization on 3/20   - CT 4/6 with unchanged large left subcapsular and perinephric hematoma.  - Monitor for now    # Hypernatremia  - s/p D5W   - Continue on  Q6H   - Monitor sodium     # Hyperphosphatemia   - Phos elevation likely second to renal failure   - PTH elevated, however Phos slowly down trending   - Monitor phos    INFECTIOUS DISEASE   # Fever   - Return of fever with TMAX 100.6F overnight   - CXR with RLL infiltrate similar to prior, however slightly improved.   - RVP negative   - UA with pyuria   - PCT 0.5   - Pending SCx and BCx   - WBC coming down and will monitor off ABX.     # VAP   - Post arrest noted with concern for aspiration and VAP in ICU   - Flu, SCx, BAL, BCx and UCx negative   - MRSA PCR with MSSA and completed bactroban in ICU   - Completed aztreonam (3/22-3/27) then casey (3/27-3/31) and vanco by dose in ICU   - CXR in ICU with RUL consolidation and completed recurrent casey course in ICU (4/2-4/7).   - Monitor off ABX    HEMATOLOGIC  # Anemia with SCC vs L kidney bleed vs GIB   - Baseline hemoglobin outpatient ~6.0-7.0 and sent in for anemia down to 5.3 without signs of bleeding and more likely SCC.    - Transfusion given on admission and improved back to baseline.   - Course complicated by hemorrhagic shock from left renal hematoma and HH down to 3.4.  - s/p 10U PRBC total while in ICU   - s/p PRBC pre-GTUBE with HH increased to 8.0.   - HH dropping post op down to 7.0 > 5.9 , however no acute signs of bleeding from surgical site or and/ or signs of sickling.   -s/p 1 unit PRBC 4/22  - HH more likely trending back to baseline, however will check CT AP to rule out intra-abdominal bleed.    - Monitor HH     # SCD   - Discontinued deferasirox due to current renal failure   - Monitor Sickle labs QD (CBC with Diff, Retic, BMP, Hepatic Function, LDH and Hapto, and VBG).     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   - Heparin GTT held for G TUBE  - Hold resuming and monitor HH  - LUE precautions     # LUE arm infiltration   - HCO3 GTT infilitration in ICU   - Seen by hand sx and no compartment syndrome concern   - Monitor for now     # RUE blood infiltration   - RUE blood transfusion infiltration noted on 4/17 with area of ecchymosis   - RPT DOPPLER with known R IJ DVT, however no upper arm thrombus or issues in area of infiltration  - Monitor site for now    ENDOCRINE   # Glycemic control   - Monitor BG   - No hx of DM2     SKIN   - Wound care reccs appreciated    ETHICS   - FULL CODE     DISPO - TBD 43 YO M with PMHx of sickle cell disease with prior acute chest syndrome requiring transfusion and exchange in the past (last 12/2024), iron overload, gout, HTN, and RUE DVT in 12/2024 on eliquis who presented on 3/15 by his hematologist second to anemia and hypoxia, down to 5.3 from baseline 7-8, with concern for vaso-occlusive crisis. While on medicine, anemia improved post transfusion and continued on SCC pain control PCA. Patient noted with left renal mass during prior admission and s/p L ureteroscopy with biopsy and stent placement on 3/19. Course complicated with hypoglycemia on 3/20 with RRT x 2 and found with severe anemia to 3.4 with hemorrhagic shock second to left perirenal and subcapsular hemorrhage and ultimately PEA arrest. ROSC achieved and transferred to MICU. s/p IR emobilization and course complicated anoxic brain injury, myoclonic jerks, GIB, MATA, HAGMA, DVTs (R IJ and LUE brachial), dysphagia, cirrhosis, and prolonged vent time s/p trach on 4/4. Transferred to [facility] on 4/18.     NEUROLOGY  # Anoxic brain injury   - s/p cardiac arrest on 3/20 with ROSC in 4 minutes   - CT HEAD post arrest with diffuse sulcal effacement with increased intracranial pressure, and few patchy foci of cortical blurring concern for HIE  - MRI brain post arrest with with diffuse global abnormal supratentorial cortical restricted diffusion consistent with global hypoxic injury   - Monitor for now    # Seizures vs myoclonic jerks   - Witness myoclonic jerking concerning for seizures with anoxic brain injury  - vEEG with abundant myoclonic seizures in the setting of a severe diffuse/multifocal cerebral dysfunction which can be seen in the setting of sedative effect or due to anoxia  - s/p valium 10 Q4H   - Continue on keppra   - Continue on klonopin 2mg TID   - Continue on dilaudid PRN     CARDIOLOGY  # Shock state likely hemorrhagic vs vasoplegia  - Acute renal bleed noted requiring multiple transfusions and pressors in ICU.   - TTE 3/22 with EF 63 with normal LVRVSF with PASP 49  - Weaned off pressors in ICU and complicated by hypertension.   - Started on norvasc 5, however dc'ed as BP improved on opioids   - Monitor BP     # Autonomic vs pain  - Mild HTN and tachypnea noted likely pain induced vs autonomia?   - Continue on dilaudid pushes PRN    PULMONARY  # Respiratory failure   - Presented with SCC and hypoxia likely from anemia with no signs of acute chest, however noted with PEA with hemorrhagic shock post renal bx requiring intubation.   - Prolonged intubation and s/p 7 PORTEX trach on 4/4   - Continue on vent 20/460/6/30   - BL LL atelectasis and continue on nebs with chest PT  - PIPs normal and holding IPV for now  - Continue on dilaudid PRN     GI  # Shock liver vs cirrhosis with sickle cell   - Transaminitis noted in ICU and thought to be second to shock liver likely second to hemorrhagic shock vs cardiac arrest, however LFTs remains elevated with acute on chronic hyperbilirubinemia.   - US ABD suggestive of early cirrhotic changes, cholecystectomy, and CBD 9mm   - CT AP with cirrhosis   - Trend LFTs and bili     # GIB  - Anemia with GIB noted in ICU   - s/p EGD 4/11 with cauterization of gastric ulcer & hemostatic spraying of three duodenal ulcers. Gastric ulcer likely due to NGT trauma.   - Continue on PPI BID   - Monitor stools and HH     # Oropharyngeal dysphagia   - GI unable to place PEG due to hepatomegaly.   - IR unable to place PEG second to no safe window   - s/p open G TUBE placement on 4/18   - Continue on TF    RENAL  # Acute renal failure and HAGMA second to shock state   - Scr on admission was 1.25 (3/15/25) and increased to 6s while in ICU and s/p HD 3/24-3/29 and then again on 4/4.   - L SHILE removed on 4/15   - CRE slowly improving but mild acidosis remains   - Continue on HCO3 1300 TID tabs for now   - Continue with daniel for now  - Monitor renal function, acidosis, and UOP     # L renal mass bx c/b hematoma   - Renal bx as below on 3/19  - Hemorrhagic shock and PEA arrest on 3/20   - Renal US with large L renal subcapsular hematoma.    - IR left renal angiogram and embolization on 3/20   - CT 4/6 with unchanged large left subcapsular and perinephric hematoma.  - Monitor for now    # Hypernatremia  - s/p D5W   - Continue on  Q6H   - Monitor sodium     # Hyperphosphatemia   - Phos elevation likely second to renal failure   - PTH elevated, however Phos slowly down trending   - Monitor phos    INFECTIOUS DISEASE   # Fever   -(4/20_CXR with RLL infiltrate similar to prior, however slightly improved.   -(4/20)RVP negative   -(4/20) UA with pyuria   - PCT 0.5   - (4/20)  SCx and BCx -SC:x: commensal lay consistent with body site and few polymorphonecular leukocytes no squamous epithelial cells , few yeast like cells    - WBC coming down and will monitor off ABX.  - Return of fever with TMAX 100.7 F (4/22)     # VAP   - Post arrest noted with concern for aspiration and VAP in ICU   - Flu, SCx, BAL, BCx and UCx negative   - MRSA PCR with MSSA and completed bactroban in ICU   - Completed aztreonam (3/22-3/27) then casey (3/27-3/31) and vanco by dose in ICU   - CXR in ICU with RUL consolidation and completed recurrent casey course in ICU (4/2-4/7).   - Monitor off ABX    HEMATOLOGIC  # Anemia with SCC vs L kidney bleed vs GIB   - Baseline hemoglobin outpatient ~6.0-7.0 and sent in for anemia down to 5.3 without signs of bleeding and more likely SCC.    - Transfusion given on admission and improved back to baseline.   - Course complicated by hemorrhagic shock from left renal hematoma and HH down to 3.4.  - s/p 10U PRBC total while in ICU   - s/p PRBC pre-GTUBE with HH increased to 8.0.   - Trend HH  - CT/AP (4/20) Interval placement of gastrostomy tube without evidence of active GI   bleeding, Grossly stable left renal subcapsular hematoma without evidence of   active bleeding, Persistent small bilateral pleural effusions and airspace opacities.    - Monitor HH   -s/p 1 unit PRBC 4/22    # SCD   - Discontinued deferasirox due to current renal failure   - Monitor Sickle labs QD (CBC with Diff, Retic, BMP, Hepatic Function, LDH and Hapto, and VBG).     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   - Heparin GTT held for G TUBE  - Hold resuming and monitor HH  - LUE precautions     # LUE arm infiltration   - HCO3 GTT infilitration in ICU   - Seen by hand sx and no compartment syndrome concern   - Monitor for now     # RUE blood infiltration   - RUE blood transfusion infiltration noted on 4/17 with area of ecchymosis   - RPT DOPPLER with known R IJ DVT, however no upper arm thrombus or issues in area of infiltration  - Monitor site for now    ENDOCRINE   # Glycemic control   - Monitor BG   - No hx of DM2     SKIN   - Wound care reccs appreciated    ETHICS   - FULL CODE     DISPO - TBD 45 YO M with PMHx of sickle cell disease with prior acute chest syndrome requiring transfusion and exchange in the past (last 12/2024), iron overload, gout, HTN, and RUE DVT in 12/2024 on eliquis who presented on 3/15 by his hematologist second to anemia and hypoxia, down to 5.3 from baseline 7-8, with concern for vaso-occlusive crisis. While on medicine, anemia improved post transfusion and continued on SCC pain control PCA. Patient noted with left renal mass during prior admission and s/p L ureteroscopy with biopsy and stent placement on 3/19. Course complicated with hypoglycemia on 3/20 with RRT x 2 and found with severe anemia to 3.4 with hemorrhagic shock second to left perirenal and subcapsular hemorrhage and ultimately PEA arrest. ROSC achieved and transferred to MICU. s/p IR emobilization and course complicated anoxic brain injury, myoclonic jerks, GIB, MATA, HAGMA, DVTs (R IJ and LUE brachial), dysphagia, cirrhosis, and prolonged vent time s/p trach on 4/4. Transferred to RCU on 4/18.     NEUROLOGY  # Anoxic brain injury   - s/p cardiac arrest on 3/20 with ROSC in 4 minutes   - CT HEAD post arrest with diffuse sulcal effacement with increased intracranial pressure, and few patchy foci of cortical blurring concern for HIE  - MRI brain post arrest with with diffuse global abnormal supratentorial cortical restricted diffusion consistent with global hypoxic injury   - Monitor for now    # Seizures vs myoclonic jerks   - Witness myoclonic jerking concerning for seizures with anoxic brain injury  - vEEG with abundant myoclonic seizures in the setting of a severe diffuse/multifocal cerebral dysfunction which can be seen in the setting of sedative effect or due to anoxia  - s/p valium 10 Q4H   - Continue on keppra   - Continue on klonopin 2mg TID   - Continue on dilaudid PRN     CARDIOLOGY  # Shock state likely hemorrhagic vs vasoplegia  - Acute renal bleed noted requiring multiple transfusions and pressors in ICU.   - TTE 3/22 with EF 63 with normal LVRVSF with PASP 49  - Weaned off pressors in ICU and complicated by hypertension.   - Started on norvasc 5, however dc'ed as BP improved on opioids   - Monitor BP     # Autonomic vs pain  - Mild HTN and tachypnea noted likely pain induced vs autonomia?   - Continue on dilaudid pushes PRN    PULMONARY  # Respiratory failure   - Presented with SCC and hypoxia likely from anemia with no signs of acute chest, however noted with PEA with hemorrhagic shock post renal bx requiring intubation.   - Prolonged intubation and s/p 7 PORTEX trach on 4/4   - Continue on vent 20/460/6/30   - BL LL atelectasis and continue on nebs with chest PT  - PIPs normal and holding IPV for now  - Continue on dilaudid PRN     GI  # Shock liver vs cirrhosis with sickle cell   - Transaminitis noted in ICU and thought to be second to shock liver likely second to hemorrhagic shock vs cardiac arrest, however LFTs remains elevated with acute on chronic hyperbilirubinemia.   - US ABD suggestive of early cirrhotic changes, cholecystectomy, and CBD 9mm   - CT AP with cirrhosis   - Trend LFTs and bili     # GIB  - Anemia with GIB noted in ICU   - s/p EGD 4/11 with cauterization of gastric ulcer & hemostatic spraying of three duodenal ulcers. Gastric ulcer likely due to NGT trauma.   - Continue on PPI BID   - Monitor stools and HH     # Oropharyngeal dysphagia   - GI unable to place PEG due to hepatomegaly.   - IR unable to place PEG second to no safe window   - s/p open G TUBE placement on 4/18   - Continue on TF    RENAL  # Acute renal failure and HAGMA second to shock state   - Scr on admission was 1.25 (3/15/25) and increased to 6s while in ICU and s/p HD 3/24-3/29 and then again on 4/4.   - L SHILE removed on 4/15   - CRE slowly improving but mild acidosis remains   - Continue on HCO3 1300 TID tabs for now   - Continue with daniel for now  - Monitor renal function, acidosis, and UOP     # L renal mass bx c/b hematoma   - Renal bx as below on 3/19  - Hemorrhagic shock and PEA arrest on 3/20   - Renal US with large L renal subcapsular hematoma.    - IR left renal angiogram and embolization on 3/20   - CT 4/6 with unchanged large left subcapsular and perinephric hematoma.  - Monitor for now    # Hypernatremia  - s/p D5W   - Continue on  Q6H   - Monitor sodium     # Hyperphosphatemia   - Phos elevation likely second to renal failure   - PTH elevated, however Phos slowly down trending   - Monitor phos    INFECTIOUS DISEASE   # Fever   -(4/20_CXR with RLL infiltrate similar to prior, however slightly improved.   -(4/20)RVP negative   -(4/20) UA with pyuria   - PCT 0.5   - (4/20)  SCx and BCx -SC:x: commensal lay consistent with body site and few polymorphonecular leukocytes no squamous epithelial cells , few yeast like cells  .  -BCx NGT  - WBC coming down and will monitor off ABX.  - Return of fever with TMAX 100.7 F (4/22)     # VAP   - Post arrest noted with concern for aspiration and VAP in ICU   - Flu, SCx, BAL, BCx and UCx negative   - MRSA PCR with MSSA and completed bactroban in ICU   - Completed aztreonam (3/22-3/27) then casey (3/27-3/31) and vanco by dose in ICU   - CXR in ICU with RUL consolidation and completed recurrent casey course in ICU (4/2-4/7).   - Monitor off ABX    HEMATOLOGIC  # Anemia with SCC vs L kidney bleed vs GIB   - Baseline hemoglobin outpatient ~6.0-7.0 and sent in for anemia down to 5.3 without signs of bleeding and more likely SCC.    - Transfusion given on admission and improved back to baseline.   - Course complicated by hemorrhagic shock from left renal hematoma and HH down to 3.4.  - s/p 10U PRBC total while in ICU   - s/p PRBC pre-GTUBE with HH increased to 8.0.   - Trend HH  - CT/AP (4/20) Interval placement of gastrostomy tube without evidence of active GI   bleeding, Grossly stable left renal subcapsular hematoma without evidence of   active bleeding, Persistent small bilateral pleural effusions and airspace opacities.    - Monitor HH   -s/p 1 unit PRBC 4/22    # SCD   - Discontinued deferasirox due to current renal failure   - Monitor Sickle labs QD (CBC with Diff, Retic, BMP, Hepatic Function, LDH and Hapto, and VBG).     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   - Heparin GTT held for G TUBE  - Hold resuming and monitor HH  - LUE precautions     # LUE arm infiltration   - HCO3 GTT infilitration in ICU   - Seen by hand sx and no compartment syndrome concern   - Monitor for now     # RUE blood infiltration   - RUE blood transfusion infiltration noted on 4/17 with area of ecchymosis   - RPT DOPPLER with known R IJ DVT, however no upper arm thrombus or issues in area of infiltration  - Monitor site for now    ENDOCRINE   # Glycemic control   - Monitor BG   - No hx of DM2     SKIN   - Wound care reccs appreciated    ETHICS   - FULL CODE     DISPO - TBD

## 2025-04-22 NOTE — CHART NOTE - NSCHARTNOTEFT_GEN_A_CORE
NUTRITION FOLLOW UP NOTE     REASON FOR ASSESSMENT: SEVERE MALNUTRITION FOLLOW UP     SOURCE:    [x] Medical record [x] RN/PCA  [x]Patient inappropriate (disoriented, nonverbal)    MEDICAL COURSE:  Per chart, ""43 YO M with PMHx of sickle cell disease with prior acute chest syndrome requiring transfusion and exchange in the past (last 12/2024), iron overload, gout, HTN, and RUE DVT in 12/2024 on eliquis who presented on 3/15 by his hematologist second to anemia and hypoxia, down to 5.3 from baseline 7-8, with concern for vaso-occlusive crisis. While on medicine, anemia improved post transfusion and continued on SCC pain control PCA. Patient noted with left renal mass during prior admission and s/p L ureteroscopy with biopsy and stent placement on 3/19. Course complicated with hypoglycemia on 3/20 with RRT x 2 and found with severe anemia to 3.4 with hemorrhagic shock second to left perirenal and subcapsular hemorrhage and ultimately PEA arrest."    DIET PRESCRIPTION:  Diet, NPO with Tube Feed:   Tube Feeding Modality: Gastrostomy  Nepro with Carb Steady (NEPRORTH)  Total Volume for 24 Hours (mL): 720  Continuous  Starting Tube Feed Rate {mL per Hour}: 40  Until Goal Tube Feed Rate (mL per Hour): 40  Tube Feed Duration (in Hours): 18  Tube Feed Start Time: 11:00  Tube Feed Stop Time: 05:00  Liquid Protein Supplement     Qty per Day:  4 (04-18-25 @ 14:44)      NUTRITION COURSE:  Unable to conduct nutrition interview 2/2 decreased cognition. Information obtained from comprehensive chart review and per RN today: No reports of any recent episodes of nausea, vomiting, diarrhea or constipation, Last BM noted on 4/21 per RN flowsheets. Noted with Flexiseal in place. Pt noted with PEG placement on 4/18. Pt remains NPO TF only as sole source of nutrition and hydration; Nepro @ 40ml/hr x18hrs, LPS 4x/day. Per this RD last note made recommendation to change TF to Nepro @ 65ml/hr x18hrs and d/c LPS. Will recommend again.     TF to provide (77kg IBW); 1170ml total volume, 2106 kcal (27 kcal/kg), 95gm protein (1.2gm/kg), 854ml free water from formula.       PERTINENT MEDICATIONS:  MEDICATIONS  (STANDING):  albuterol/ipratropium for Nebulization 3 milliLiter(s) Nebulizer every 6 hours  chlorhexidine 0.12% Liquid 15 milliLiter(s) Oral Mucosa two times a day  chlorhexidine 2% Cloths 1 Application(s) Topical daily  clonazePAM  Tablet 2 milliGRAM(s) Oral every 8 hours  influenza   Vaccine 0.5 milliLiter(s) IntraMuscular once  levETIRAcetam  Solution 500 milliGRAM(s) Oral two times a day  pantoprazole   Suspension 40 milliGRAM(s) Oral every 12 hours  sodium bicarbonate 1300 milliGRAM(s) Oral three times a day    MEDICATIONS  (PRN):  HYDROmorphone  Injectable 0.5 milliGRAM(s) IV Push every 4 hours PRN dysynchronny      PERTINENT LABS:  04-22 Na144 mmol/L Glu 135 mg/dL[H] K+ 4.1 mmol/L Cr  1.86 mg/dL[H] BUN 54 mg/dL[H] 04-22 Phos 3.4 mg/dL 04-22 Alb 2.6 g/dL[L] 04-04 Chol --    LDL --    HDL --    Trig 364 mg/dL[H]    POCT Blood Glucose.: 139 mg/dL (22 Apr 2025 06:01)  POCT Blood Glucose.: 123 mg/dL (21 Apr 2025 16:42)  POCT Blood Glucose.: 127 mg/dL (21 Apr 2025 11:22)    [x] Relevant notes on labs: Noted with elevated BUN, Cr, and elevated Glucose levels- continues on therapeutic formula. Would recommend Hgba1c.     ANTHROPOMETRICS:  Weight: Height (cm): 175 (04-18 @ 08:20)  Weight (kg): 88.8 (04-18 @ 08:20)  BMI (kg/m2): 29 (04-18 @ 08:20)  Weight Assessment: No new weight to assess.     PHYSICAL ASSESSMENT, per flowsheets:  Edema: Generalized 1+   Pressure Injury: none    ESTIMATED NEEDS:  [X] No change since previous assessment, Based on Ideal Body Weight +10% 169.4lbs/ 77kg  7246-8050 kcal/d, 25-30 kcal/kg, 92-115gm protein/d, 1.2-1.5gm/kg    PREVIOUS NUTRITION DX: [ x] Malnutrition   Nutrition Diagnosis is [x] ongoing  [ ] resolved [ ] not applicable   New Nutrition Diagnosis: [x] not applicable     EDUCATION:  [ x] N/A 2/2 decreased cognition      RECOMMENDATIONS/INTERVENTIONS:  1) Change TF to Nepro @ 65ml/hr x18hrs, d/c LPS.   2) Obtain HgbA1C   3) RD to f/u prn       MONITORING & EVALUATING:  Tolerance to diet/supplement, nutrition related lab values, weight trends, BMs/GI distress, hydration status, skin integrity.    Jeanine Massey MS, RDN | Available on [facility] TE[employer] | Office #[phone]

## 2025-04-22 NOTE — PROGRESS NOTE ADULT - NUTRITIONAL ASSESSMENT
This patient has been assessed with a concern for Malnutrition and has been determined to have a diagnosis/diagnoses of Severe protein-calorie malnutrition.    This patient is being managed with:   Diet NPO with Tube Feed-  Tube Feeding Modality: Gastrostomy  Nepro with Carb Steady (NEPRORTH)  Total Volume for 24 Hours (mL): 720  Continuous  Starting Tube Feed Rate {mL per Hour}: 40  Until Goal Tube Feed Rate (mL per Hour): 40  Tube Feed Duration (in Hours): 18  Tube Feed Start Time: 11:00  Tube Feed Stop Time: 05:00  Liquid Protein Supplement     Qty per Day:  4  Entered: Apr 18 2025  2:43PM   This patient has been assessed with a concern for Malnutrition and has been determined to have a diagnosis/diagnoses of Severe protein-calorie malnutrition.    This patient is being managed with:   Diet NPO with Tube Feed-  Tube Feeding Modality: Gastrostomy  Nepro with Carb Steady (NEPRORTH)  Total Volume for 24 Hours (mL): 720  Continuous  Starting Tube Feed Rate {mL per Hour}: 65  Until Goal Tube Feed Rate (mL per Hour): 65  Tube Feed Duration (in Hours): 18  Tube Feed Start Time: 11:00  Tube Feed Stop Time: 05:00  Liquid Protein Supplement     Qty per Day:  4  Entered: Apr 18 2025  2:43PM

## 2025-04-22 NOTE — PROGRESS NOTE ADULT - SUBJECTIVE AND OBJECTIVE BOX
CHIEF COMPLAINT: Patient is a 44y old  Male who presents with a chief complaint of Referred by Hematologist for low Hg  INTERVAL EVENTS:     ROS: Seen by bedside during AM rounds     OBJECTIVE:  ICU Vital Signs Last 24 Hrs  T(C): 36.6 (22 Apr 2025 05:00), Max: 37.9 (21 Apr 2025 17:30)  T(F): 97.9 (22 Apr 2025 05:00), Max: 100.2 (21 Apr 2025 17:30)  HR: 110 (22 Apr 2025 05:00) (102 - 122)  BP: 132/82 (22 Apr 2025 05:00) (132/82 - 169/96)  BP(mean): 98 (22 Apr 2025 05:00) (98 - 112)  ABP: --  ABP(mean): --  RR: 20 (22 Apr 2025 05:00) (20 - 29)  SpO2: 98% (22 Apr 2025 05:00) (98% - 100%)    O2 Parameters below as of 22 Apr 2025 05:00  Patient On (Oxygen Delivery Method): ventilator    O2 Concentration (%): 50      Mode: AC/ CMV (Assist Control/ Continuous Mandatory Ventilation), RR (machine): 20, TV (machine): 460, FiO2: 30, PEEP: 6, ITime: 0.72, MAP: 11, PIP: 23    04-21 @ 07:01  -  04-22 @ 07:00  --------------------------------------------------------  IN: 1650 mL / OUT: 1650 mL / NET: 0 mL      CAPILLARY BLOOD GLUCOSE      POCT Blood Glucose.: 139 mg/dL (22 Apr 2025 06:01)    PHYSICAL EXAMINATION  General: NAD   HEENT: Scleral icterus and trach present  Cards: S1/S2, no murmurs   Pulm: Course vent sounds bilaterally. No wheezes.   Abdomen: Soft, nondistended and nontender.   Extremities: No pedal edema. No active SABINO of BL upper and lower extremities. Bicep activation noted with noxious stimuli.   Neurology: Eyes open intermittently but does not follow commands with HIE with no acute focal neurological deficits      Mode: AC/ CMV (Assist Control/ Continuous Mandatory Ventilation)  RR (machine): 20  TV (machine): 460  FiO2: 30  PEEP: 6  ITime: 0.72  MAP: 11  PIP: 23      HOSPITAL MEDICATIONS:  MEDICATIONS  (STANDING):  albuterol/ipratropium for Nebulization 3 milliLiter(s) Nebulizer every 6 hours  chlorhexidine 0.12% Liquid 15 milliLiter(s) Oral Mucosa two times a day  chlorhexidine 2% Cloths 1 Application(s) Topical daily  clonazePAM  Tablet 2 milliGRAM(s) Oral every 8 hours  influenza   Vaccine 0.5 milliLiter(s) IntraMuscular once  levETIRAcetam  Solution 500 milliGRAM(s) Oral two times a day  pantoprazole   Suspension 40 milliGRAM(s) Oral every 12 hours  sodium bicarbonate 1300 milliGRAM(s) Oral three times a day    MEDICATIONS  (PRN):  HYDROmorphone  Injectable 0.5 milliGRAM(s) IV Push every 4 hours PRN dysynchronny      LABS:                        6.6    18.87 )-----------( 343      ( 21 Apr 2025 05:30 )             20.5     04-22    144  |  113[H]  |  54[H]  ----------------------------<  135[H]  4.1   |  21[L]  |  1.86[H]    Ca    9.0      22 Apr 2025 05:55  Phos  3.4     04-22  Mg     2.00     04-22    TPro  7.1  /  Alb  2.6[L]  /  TBili  3.8[H]  /  DBili  x   /  AST  89[H]  /  ALT  41  /  AlkPhos  311[H]  04-22      Urinalysis Basic - ( 22 Apr 2025 05:55 )    Color: x / Appearance: x / SG: x / pH: x  Gluc: 135 mg/dL / Ketone: x  / Bili: x / Urobili: x   Blood: x / Protein: x / Nitrite: x   Leuk Esterase: x / RBC: x / WBC x   Sq Epi: x / Non Sq Epi: x / Bacteria: x        Venous Blood Gas:  04-21 @ 05:30  7.35/37/146/20/98.8  VBG Lactate: 1.5

## 2025-04-22 NOTE — PROGRESS NOTE ADULT - NS ATTEND AMEND GEN_ALL_CORE FT
44 year old male with a history of SSD, UE DVT, gout, HTN, with L renal mass admitted with acute anemia/ sickle cell crisis and evaluation of his left renal mass s/p left ureteroscopy and biopsy (3/19/25) with 24 hour post operative course complicated by rapid responses for hypoglycemia c/b cardiac arrest x 4 minutes CPR/1 epi with ROSC, transferred to MICU for further care     Post rosc found to be profoundly anemic, acidemic, with multiorgan dysfunction overall concerning for acute hemorrhagic shock.   Now with anoxia on CTH as well as MRI. Also with myclonus.  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     Patient after long GOC with family is now s/p trach and peg. H/H low but stable in setting of SSD. CT without source of bleeding. No longer requiring HD.     Having on and off fevers, cx NTD. Possibly central fevers.     # Cardiac arrest  # Anoxic encephalopathy  # Myoclonus  # Anemia/SSD  # Transaminitis  # MATA 2/2 to ATN  # DVT  # Hyperbilirubinemia, Early cirrhosis?  # Urothelial Cancer.   # Fever  - Cardiac arrest, short down time but with likely anoxic injury and multiorgan dysfunction in setting of hemorrhage, time of arrest Hg was 3 and hypoglycemia  - Repeat CT and MRI showing diffuse anoxia. Clinically doing poorly. Simulation myoclonus. C/W PO benzos as tolerated. PRN opoids.   - C/W vent, patient unlikely to be wean from the ventilator.   - Transfuse PRN, s/p embolization by IR. Hematoma stable on CT scan. Not tolerating full a/c.   - DVT in the upper ext likely related with catheter.  - SSD, monitor hemolysis labs. SS labs  - LFT overall downtrending. continue to mointor.   - MATA. s/p HD. Monitor urine output. Trend Cr.   - Monitor off abx for now. S/p multiple courses of abx.   - Hand lesions. Initially consulted vascular now vascular recommending plastics/hand--> no surgical interventions.   - Biopsy showing Non-invasive papillary urothelial carcinoma. Not a candidate for DMT per Onc.   - DVT ppx- SCD for now.   - Dispo- full code.

## 2025-04-23 ENCOUNTER — APPOINTMENT (OUTPATIENT)
Dept: HEPATOLOGY | Facility: CLINIC | Age: 45
End: 2025-04-23

## 2025-04-23 LAB
ADD ON TEST-SPECIMEN IN LAB: SIGNIFICANT CHANGE UP
ADD ON TEST-SPECIMEN IN LAB: SIGNIFICANT CHANGE UP
ANION GAP SERPL CALC-SCNC: 13 MMOL/L — SIGNIFICANT CHANGE UP (ref 7–14)
APPEARANCE UR: ABNORMAL
B PERT DNA SPEC QL NAA+PROBE: SIGNIFICANT CHANGE UP
B PERT+PARAPERT DNA PNL SPEC NAA+PROBE: SIGNIFICANT CHANGE UP
BACTERIA # UR AUTO: ABNORMAL /HPF
BILIRUB UR-MCNC: NEGATIVE — SIGNIFICANT CHANGE UP
BUN SERPL-MCNC: 54 MG/DL — HIGH (ref 7–23)
C PNEUM DNA SPEC QL NAA+PROBE: SIGNIFICANT CHANGE UP
CALCIUM SERPL-MCNC: 9.3 MG/DL — SIGNIFICANT CHANGE UP (ref 8.4–10.5)
CAST: SIGNIFICANT CHANGE UP /LPF (ref 0–4)
CHLORIDE SERPL-SCNC: 110 MMOL/L — HIGH (ref 98–107)
CO2 SERPL-SCNC: 20 MMOL/L — LOW (ref 22–31)
COLOR SPEC: SIGNIFICANT CHANGE UP
CREAT SERPL-MCNC: 1.68 MG/DL — HIGH (ref 0.5–1.3)
DIFF PNL FLD: ABNORMAL
EGFR: 51 ML/MIN/1.73M2 — LOW
EGFR: 51 ML/MIN/1.73M2 — LOW
FLUAV AG NPH QL: SIGNIFICANT CHANGE UP
FLUAV SUBTYP SPEC NAA+PROBE: SIGNIFICANT CHANGE UP
FLUBV AG NPH QL: SIGNIFICANT CHANGE UP
FLUBV RNA SPEC QL NAA+PROBE: SIGNIFICANT CHANGE UP
GAS PNL BLDV: SIGNIFICANT CHANGE UP
GLUCOSE BLDC GLUCOMTR-MCNC: 125 MG/DL — HIGH (ref 70–99)
GLUCOSE BLDC GLUCOMTR-MCNC: 134 MG/DL — HIGH (ref 70–99)
GLUCOSE SERPL-MCNC: 137 MG/DL — HIGH (ref 70–99)
GLUCOSE UR QL: NEGATIVE MG/DL — SIGNIFICANT CHANGE UP
HADV DNA SPEC QL NAA+PROBE: SIGNIFICANT CHANGE UP
HAPTOGLOB SERPL-MCNC: 44 MG/DL — SIGNIFICANT CHANGE UP (ref 34–200)
HCOV 229E RNA SPEC QL NAA+PROBE: SIGNIFICANT CHANGE UP
HCOV HKU1 RNA SPEC QL NAA+PROBE: SIGNIFICANT CHANGE UP
HCOV NL63 RNA SPEC QL NAA+PROBE: SIGNIFICANT CHANGE UP
HCOV OC43 RNA SPEC QL NAA+PROBE: SIGNIFICANT CHANGE UP
HCT VFR BLD CALC: 21.2 % — LOW (ref 39–50)
HGB BLD-MCNC: 7.1 G/DL — LOW (ref 13–17)
HMPV RNA SPEC QL NAA+PROBE: SIGNIFICANT CHANGE UP
HPIV1 RNA SPEC QL NAA+PROBE: SIGNIFICANT CHANGE UP
HPIV2 RNA SPEC QL NAA+PROBE: SIGNIFICANT CHANGE UP
HPIV3 RNA SPEC QL NAA+PROBE: SIGNIFICANT CHANGE UP
HPIV4 RNA SPEC QL NAA+PROBE: SIGNIFICANT CHANGE UP
HYALINE CASTS # UR AUTO: PRESENT
KETONES UR-MCNC: NEGATIVE MG/DL — SIGNIFICANT CHANGE UP
LEUKOCYTE ESTERASE UR-ACNC: ABNORMAL
M PNEUMO DNA SPEC QL NAA+PROBE: SIGNIFICANT CHANGE UP
MAGNESIUM SERPL-MCNC: 1.9 MG/DL — SIGNIFICANT CHANGE UP (ref 1.6–2.6)
MCHC RBC-ENTMCNC: 30 PG — SIGNIFICANT CHANGE UP (ref 27–34)
MCHC RBC-ENTMCNC: 33.5 G/DL — SIGNIFICANT CHANGE UP (ref 32–36)
MCV RBC AUTO: 89.5 FL — SIGNIFICANT CHANGE UP (ref 80–100)
MUCOUS THREADS # UR AUTO: PRESENT
NITRITE UR-MCNC: NEGATIVE — SIGNIFICANT CHANGE UP
NRBC # BLD AUTO: 0 K/UL — SIGNIFICANT CHANGE UP (ref 0–0)
NRBC # FLD: 0 K/UL — SIGNIFICANT CHANGE UP (ref 0–0)
NRBC BLD AUTO-RTO: 0 /100 WBCS — SIGNIFICANT CHANGE UP (ref 0–0)
PH UR: 7 — SIGNIFICANT CHANGE UP (ref 5–8)
PHOSPHATE SERPL-MCNC: 2.9 MG/DL — SIGNIFICANT CHANGE UP (ref 2.5–4.5)
PLATELET # BLD AUTO: 359 K/UL — SIGNIFICANT CHANGE UP (ref 150–400)
POTASSIUM SERPL-MCNC: 3.9 MMOL/L — SIGNIFICANT CHANGE UP (ref 3.5–5.3)
POTASSIUM SERPL-SCNC: 3.9 MMOL/L — SIGNIFICANT CHANGE UP (ref 3.5–5.3)
PROCALCITONIN SERPL-MCNC: 0.65 NG/ML — HIGH (ref 0.02–0.1)
PROCALCITONIN SERPL-MCNC: SIGNIFICANT CHANGE UP NG/ML (ref 0.02–0.1)
PROT UR-MCNC: 100 MG/DL
RAPID RVP RESULT: SIGNIFICANT CHANGE UP
RBC # BLD: 2.37 M/UL — LOW (ref 4.2–5.8)
RBC # BLD: 2.37 M/UL — LOW (ref 4.2–5.8)
RBC # FLD: 18 % — HIGH (ref 10.3–14.5)
RBC CASTS # UR COMP ASSIST: 10 /HPF — HIGH (ref 0–4)
RETICS #: 65.2 K/UL — SIGNIFICANT CHANGE UP (ref 25–125)
RETICS/RBC NFR: 2.8 % — HIGH (ref 0.5–2.5)
REVIEW: SIGNIFICANT CHANGE UP
RSV RNA NPH QL NAA+NON-PROBE: SIGNIFICANT CHANGE UP
RSV RNA SPEC QL NAA+PROBE: SIGNIFICANT CHANGE UP
RV+EV RNA SPEC QL NAA+PROBE: SIGNIFICANT CHANGE UP
SARS-COV-2 RNA SPEC QL NAA+PROBE: SIGNIFICANT CHANGE UP
SARS-COV-2 RNA SPEC QL NAA+PROBE: SIGNIFICANT CHANGE UP
SODIUM SERPL-SCNC: 143 MMOL/L — SIGNIFICANT CHANGE UP (ref 135–145)
SOURCE RESPIRATORY: SIGNIFICANT CHANGE UP
SP GR SPEC: 1.02 — SIGNIFICANT CHANGE UP (ref 1–1.03)
SQUAMOUS # UR AUTO: 1 /HPF — SIGNIFICANT CHANGE UP (ref 0–5)
UROBILINOGEN FLD QL: 0.2 MG/DL — SIGNIFICANT CHANGE UP (ref 0.2–1)
WBC # BLD: 23.18 K/UL — HIGH (ref 3.8–10.5)
WBC # FLD AUTO: 23.18 K/UL — HIGH (ref 3.8–10.5)
WBC CLUMPS # UR AUTO: PRESENT
WBC UR QL: 281 /HPF — HIGH (ref 0–5)

## 2025-04-23 PROCEDURE — 71045 X-RAY EXAM CHEST 1 VIEW: CPT | Mod: 26

## 2025-04-23 PROCEDURE — 99233 SBSQ HOSP IP/OBS HIGH 50: CPT

## 2025-04-23 RX ORDER — AMLODIPINE BESYLATE 10 MG/1
5 TABLET ORAL DAILY
Refills: 0 | Status: DISCONTINUED | OUTPATIENT
Start: 2025-04-23 | End: 2025-05-16

## 2025-04-23 RX ORDER — LORAZEPAM 4 MG/ML
2 VIAL (ML) INJECTION ONCE
Refills: 0 | Status: DISCONTINUED | OUTPATIENT
Start: 2025-04-23 | End: 2025-04-23

## 2025-04-23 RX ADMIN — CLONAZEPAM 2 MILLIGRAM(S): 0.5 TABLET ORAL at 13:33

## 2025-04-23 RX ADMIN — AMLODIPINE BESYLATE 5 MILLIGRAM(S): 10 TABLET ORAL at 08:25

## 2025-04-23 RX ADMIN — CLONAZEPAM 2 MILLIGRAM(S): 0.5 TABLET ORAL at 21:16

## 2025-04-23 RX ADMIN — Medication 15 MILLILITER(S): at 16:33

## 2025-04-23 RX ADMIN — IPRATROPIUM BROMIDE AND ALBUTEROL SULFATE 3 MILLILITER(S): .5; 2.5 SOLUTION RESPIRATORY (INHALATION) at 04:06

## 2025-04-23 RX ADMIN — Medication 2.5 MILLIGRAM(S): at 21:16

## 2025-04-23 RX ADMIN — Medication 40 MILLIGRAM(S): at 05:15

## 2025-04-23 RX ADMIN — LEVETIRACETAM 500 MILLIGRAM(S): 10 INJECTION, SOLUTION INTRAVENOUS at 05:13

## 2025-04-23 RX ADMIN — Medication 1300 MILLIGRAM(S): at 21:16

## 2025-04-23 RX ADMIN — Medication 2.5 MILLIGRAM(S): at 13:34

## 2025-04-23 RX ADMIN — Medication 1300 MILLIGRAM(S): at 05:14

## 2025-04-23 RX ADMIN — CLONAZEPAM 2 MILLIGRAM(S): 0.5 TABLET ORAL at 04:44

## 2025-04-23 RX ADMIN — Medication 15 MILLILITER(S): at 05:15

## 2025-04-23 RX ADMIN — IPRATROPIUM BROMIDE AND ALBUTEROL SULFATE 3 MILLILITER(S): .5; 2.5 SOLUTION RESPIRATORY (INHALATION) at 09:24

## 2025-04-23 RX ADMIN — Medication 40 MILLIGRAM(S): at 16:34

## 2025-04-23 RX ADMIN — LEVETIRACETAM 500 MILLIGRAM(S): 10 INJECTION, SOLUTION INTRAVENOUS at 16:34

## 2025-04-23 RX ADMIN — IPRATROPIUM BROMIDE AND ALBUTEROL SULFATE 3 MILLILITER(S): .5; 2.5 SOLUTION RESPIRATORY (INHALATION) at 22:45

## 2025-04-23 RX ADMIN — Medication 2 MILLIGRAM(S): at 16:33

## 2025-04-23 RX ADMIN — IPRATROPIUM BROMIDE AND ALBUTEROL SULFATE 3 MILLILITER(S): .5; 2.5 SOLUTION RESPIRATORY (INHALATION) at 16:48

## 2025-04-23 RX ADMIN — Medication 0.5 MILLIGRAM(S): at 13:23

## 2025-04-23 RX ADMIN — Medication 0.5 MILLIGRAM(S): at 07:30

## 2025-04-23 RX ADMIN — Medication 0.5 MILLIGRAM(S): at 06:30

## 2025-04-23 RX ADMIN — Medication 1 APPLICATION(S): at 13:30

## 2025-04-23 RX ADMIN — Medication 1300 MILLIGRAM(S): at 13:30

## 2025-04-23 RX ADMIN — Medication 0.5 MILLIGRAM(S): at 12:13

## 2025-04-23 NOTE — PROGRESS NOTE ADULT - NUTRITIONAL ASSESSMENT
This patient has been assessed with a concern for Malnutrition and has been determined to have a diagnosis/diagnoses of Severe protein-calorie malnutrition.    This patient is being managed with:   Diet NPO with Tube Feed-  Tube Feeding Modality: Gastrostomy  Nepro with Carb Steady (NEPRORTH)  Total Volume for 24 Hours (mL): 1170  Continuous  Starting Tube Feed Rate {mL per Hour}: 65  Until Goal Tube Feed Rate (mL per Hour): 65  Tube Feed Duration (in Hours): 18  Tube Feed Start Time: 11:00  Tube Feed Stop Time: 05:00  Entered: Apr 22 2025  9:55AM

## 2025-04-23 NOTE — CHART NOTE - NSCHARTNOTEFT_GEN_A_CORE
Called OMFS for assistance in placing bite block as patient was noted to be biting down on his tongue.   Bite block placed by OMFS .   Recommendations to be discussed tomorrow during rounds .

## 2025-04-23 NOTE — PROGRESS NOTE ADULT - ASSESSMENT
43 YO M with PMHx of sickle cell disease with prior acute chest syndrome requiring transfusion and exchange in the past (last 12/2024), iron overload, gout, HTN, and RUE DVT in 12/2024 on eliquis who presented on 3/15 by his hematologist second to anemia and hypoxia, down to 5.3 from baseline 7-8, with concern for vaso-occlusive crisis. While on medicine, anemia improved post transfusion and continued on SCC pain control PCA. Patient noted with left renal mass during prior admission and s/p L ureteroscopy with biopsy and stent placement on 3/19. Course complicated with hypoglycemia on 3/20 with RRT x 2 and found with severe anemia to 3.4 with hemorrhagic shock second to left perirenal and subcapsular hemorrhage and ultimately PEA arrest. ROSC achieved and transferred to MICU. s/p IR emobilization and course complicated anoxic brain injury, myoclonic jerks, GIB, MATA, HAGMA, DVTs (R IJ and LUE brachial), dysphagia, cirrhosis, and prolonged vent time s/p trach on 4/4. Transferred to [facility] on 4/18.     NEUROLOGY  # Anoxic brain injury   - s/p cardiac arrest on 3/20 with ROSC in 4 minutes   - CT HEAD post arrest with diffuse sulcal effacement with increased intracranial pressure, and few patchy foci of cortical blurring concern for HIE  - MRI brain post arrest with with diffuse global abnormal supratentorial cortical restricted diffusion consistent with global hypoxic injury   - Monitor for now    # Seizures vs myoclonic jerks   - Witness myoclonic jerking concerning for seizures with anoxic brain injury  - vEEG with abundant myoclonic seizures in the setting of a severe diffuse/multifocal cerebral dysfunction which can be seen in the setting of sedative effect or due to anoxia  - s/p valium 10 Q4H   - Continue on keppra   - Continue on klonopin 2mg TID   - Continue on dilaudid PRN     CARDIOLOGY  # Shock state likely hemorrhagic vs vasoplegia  - Acute renal bleed noted requiring multiple transfusions and pressors in ICU.   - TTE 3/22 with EF 63 with normal LVRVSF with PASP 49  - Weaned off pressors in ICU and complicated by hypertension.   - Started on norvasc 5, however dc'ed as BP improved on opioids   - Monitor BP   -resumed on norvasc 4/23    # Autonomic vs pain  - Mild HTN and tachypnea noted likely pain induced vs autonomia?   - Continue on dilaudid pushes PRN    PULMONARY  # Respiratory failure   - Presented with SCC and hypoxia likely from anemia with no signs of acute chest, however noted with PEA with hemorrhagic shock post renal bx requiring intubation.   - Prolonged intubation and s/p 7 PORTEX trach on 4/4   - Continue on vent 20/460/6/30   - BL LL atelectasis and continue on nebs with chest PT  - PIPs normal and holding IPV for now  - Continue on dilaudid PRN     GI  # Shock liver vs cirrhosis with sickle cell   - Transaminitis noted in ICU and thought to be second to shock liver likely second to hemorrhagic shock vs cardiac arrest, however LFTs remains elevated with acute on chronic hyperbilirubinemia.   - US ABD suggestive of early cirrhotic changes, cholecystectomy, and CBD 9mm   - CT AP with cirrhosis   - Trend LFTs and bili     # GIB  - Anemia with GIB noted in ICU   - s/p EGD 4/11 with cauterization of gastric ulcer & hemostatic spraying of three duodenal ulcers. Gastric ulcer likely due to NGT trauma.   - Continue on PPI BID   - Monitor stools and HH     # Oropharyngeal dysphagia   - GI unable to place PEG due to hepatomegaly.   - IR unable to place PEG second to no safe window   - s/p open G TUBE placement on 4/18   - Continue on TF    RENAL  # Acute renal failure and HAGMA second to shock state   - Scr on admission was 1.25 (3/15/25) and increased to 6s while in ICU and s/p HD 3/24-3/29 and then again on 4/4.   - L SHILE removed on 4/15   - CRE slowly improving but mild acidosis remains   - Continue on HCO3 1300 TID tabs for now   - Continue with daniel for now  - Monitor renal function, acidosis, and UOP     # L renal mass bx c/b hematoma   - Renal bx as below on 3/19  - Hemorrhagic shock and PEA arrest on 3/20   - Renal US with large L renal subcapsular hematoma.    - IR left renal angiogram and embolization on 3/20   - CT 4/6 with unchanged large left subcapsular and perinephric hematoma.  - Monitor for now    # Hypernatremia  - s/p D5W   - Continue on  Q6H   - Monitor sodium     # Hyperphosphatemia   - Phos elevation likely second to renal failure   - PTH elevated, however Phos slowly down trending   - Monitor phos    INFECTIOUS DISEASE   # Fever   -(4/20_CXR with RLL infiltrate similar to prior, however slightly improved.   -(4/20)RVP negative   -(4/20) UA with pyuria   - PCT 0.5   - (4/20)  SCx and BCx -SC:x: commensal lay consistent with body site and few polymorphonecular leukocytes no squamous epithelial cells , few yeast like cells  .  -BCx NGT  - WBC coming down and will monitor off ABX.  - Return of fever with TMAX 100.7 F (4/22)   -cultures sent 4/23    # VAP   - Post arrest noted with concern for aspiration and VAP in ICU   - Flu, SCx, BAL, BCx and UCx negative   - MRSA PCR with MSSA and completed bactroban in ICU   - Completed aztreonam (3/22-3/27) then casey (3/27-3/31) and vanco by dose in ICU   - CXR in ICU with RUL consolidation and completed recurrent casey course in ICU (4/2-4/7).   - Monitor off ABX    HEMATOLOGIC  # Anemia with SCC vs L kidney bleed vs GIB   - Baseline hemoglobin outpatient ~6.0-7.0 and sent in for anemia down to 5.3 without signs of bleeding and more likely SCC.    - Transfusion given on admission and improved back to baseline.   - Course complicated by hemorrhagic shock from left renal hematoma and HH down to 3.4.  - s/p 10U PRBC total while in ICU   - s/p PRBC pre-GTUBE with HH increased to 8.0.   - Trend HH  - CT/AP (4/20) Interval placement of gastrostomy tube without evidence of active GI   bleeding, Grossly stable left renal subcapsular hematoma without evidence of   active bleeding, Persistent small bilateral pleural effusions and airspace opacities.    - Monitor HH   -s/p 1 unit PRBC 4/22    # SCD   - Discontinued deferasirox due to current renal failure   - Monitor Sickle labs QD (CBC with Diff, Retic, BMP, Hepatic Function, LDH and Hapto, and VBG).     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   - Heparin GTT held for G TUBE  - Hold resuming and monitor HH  - LUE precautions     # LUE arm infiltration   - HCO3 GTT infilitration in ICU   - Seen by hand sx and no compartment syndrome concern   - Monitor for now     # RUE blood infiltration   - RUE blood transfusion infiltration noted on 4/17 with area of ecchymosis   - RPT DOPPLER with known R IJ DVT, however no upper arm thrombus or issues in area of infiltration  - Monitor site for now    ENDOCRINE   # Glycemic control   - Monitor BG   - No hx of DM2     SKIN   - Wound care reccs appreciated    ETHICS   - FULL CODE     DISPO - TBD 43 YO M with PMHx of sickle cell disease with prior acute chest syndrome requiring transfusion and exchange in the past (last 12/2024), iron overload, gout, HTN, and RUE DVT in 12/2024 on eliquis who presented on 3/15 by his hematologist second to anemia and hypoxia, down to 5.3 from baseline 7-8, with concern for vaso-occlusive crisis. While on medicine, anemia improved post transfusion and continued on SCC pain control PCA. Patient noted with left renal mass during prior admission and s/p L ureteroscopy with biopsy and stent placement on 3/19. Course complicated with hypoglycemia on 3/20 with RRT x 2 and found with severe anemia to 3.4 with hemorrhagic shock second to left perirenal and subcapsular hemorrhage and ultimately PEA arrest. ROSC achieved and transferred to MICU. s/p IR emobilization and course complicated anoxic brain injury, myoclonic jerks, GIB, MATA, HAGMA, DVTs (R IJ and LUE brachial), dysphagia, cirrhosis, and prolonged vent time s/p trach on 4/4. Transferred to [facility] on 4/18.     NEUROLOGY  # Anoxic brain injury   - s/p cardiac arrest on 3/20 with ROSC in 4 minutes   - CT HEAD post arrest with diffuse sulcal effacement with increased intracranial pressure, and few patchy foci of cortical blurring concern for HIE  - MRI brain post arrest with with diffuse global abnormal supratentorial cortical restricted diffusion consistent with global hypoxic injury   - Monitor for now    # Seizures vs myoclonic jerks   - Witness myoclonic jerking concerning for seizures with anoxic brain injury  - vEEG with abundant myoclonic seizures in the setting of a severe diffuse/multifocal cerebral dysfunction which can be seen in the setting of sedative effect or due to anoxia  - s/p valium 10 Q4H   - Continue on keppra   - Continue on klonopin 2mg TID   - Continue on dilaudid PRN     CARDIOLOGY  # Shock state likely hemorrhagic vs vasoplegia  - Acute renal bleed noted requiring multiple transfusions and pressors in ICU.   - TTE 3/22 with EF 63 with normal LVRVSF with PASP 49  - Weaned off pressors in ICU and complicated by hypertension.   - Started on norvasc 5, however dc'ed as BP improved on opioids   - Monitor BP   -resumed on norvasc 4/23    # Autonomic vs pain  - Mild HTN and tachypnea noted likely pain induced vs autonomia?   - Continue on dilaudid pushes PRN    PULMONARY  # Respiratory failure   - Presented with SCC and hypoxia likely from anemia with no signs of acute chest, however noted with PEA with hemorrhagic shock post renal bx requiring intubation.   - Prolonged intubation and s/p 7 PORTEX trach on 4/4   - Continue on vent 20/460/6/30   - BL LL atelectasis and continue on nebs with chest PT  - PIPs normal and holding IPV for now  - Continue on dilaudid PRN     GI  # Shock liver vs cirrhosis with sickle cell   - Transaminitis noted in ICU and thought to be second to shock liver likely second to hemorrhagic shock vs cardiac arrest, however LFTs remains elevated with acute on chronic hyperbilirubinemia.   - US ABD suggestive of early cirrhotic changes, cholecystectomy, and CBD 9mm   - CT AP with cirrhosis   - Trend LFTs and bili     # GIB  - Anemia with GIB noted in ICU   - s/p EGD 4/11 with cauterization of gastric ulcer & hemostatic spraying of three duodenal ulcers. Gastric ulcer likely due to NGT trauma.   - Continue on PPI BID   - Monitor stools and HH     # Oropharyngeal dysphagia   - GI unable to place PEG due to hepatomegaly.   - IR unable to place PEG second to no safe window   - s/p open G TUBE placement on 4/18   - Continue on TF    RENAL  # Acute renal failure and HAGMA second to shock state   - Scr on admission was 1.25 (3/15/25) and increased to 6s while in ICU and s/p HD 3/24-3/29 and then again on 4/4.   - L SHILE removed on 4/15   - CRE slowly improving but mild acidosis remains   - Continue on HCO3 1300 TID tabs for now   - Continue with daniel for now  - Monitor renal function, acidosis, and UOP   - trial to void (4/23) monitor urine output     # L renal mass bx c/b hematoma   - Renal bx as below on 3/19  - Hemorrhagic shock and PEA arrest on 3/20   - Renal US with large L renal subcapsular hematoma.    - IR left renal angiogram and embolization on 3/20   - CT 4/6 with unchanged large left subcapsular and perinephric hematoma.  - Monitor for now    # Hypernatremia  - s/p D5W   - Continue on  Q6H   - Monitor sodium     # Hyperphosphatemia   - Phos elevation likely second to renal failure   - PTH elevated, however Phos slowly down trending   - Monitor phos    INFECTIOUS DISEASE   # Fever   -(4/20_CXR with RLL infiltrate similar to prior, however slightly improved.   -(4/20)RVP negative   -(4/20) UA with pyuria   - PCT 0.5   - (4/20)  SCx and BCx -SC:x: commensal lay consistent with body site and few polymorphonecular leukocytes no squamous epithelial cells , few yeast like cells  .  -BCx NGT  - WBC coming down and will monitor off ABX.  - Return of fever with TMAX 100.7 F (4/22)   -(4/23) infectious workup :   * chest x ray : No focal consolidations. Cardiomegaly with small effusions unchanged.  * respiratory panel negative   *UA - cloudy, moderate leukocytes,  , RBC 10, moderate bacteria, presence of WBC clumps, mucus , and hyaline cast  -started on Levaquin 1g daily x 7 days  --Pending BCx      # VAP   - Post arrest noted with concern for aspiration and VAP in ICU   - Flu, SCx, BAL, BCx and UCx negative   - MRSA PCR with MSSA and completed bactroban in ICU   - Completed aztreonam (3/22-3/27) then casey (3/27-3/31) and vanco by dose in ICU   - CXR in ICU with RUL consolidation and completed recurrent casey course in ICU (4/2-4/7).     HEMATOLOGIC  # Anemia with SCC vs L kidney bleed vs GIB   - Baseline hemoglobin outpatient ~6.0-7.0 and sent in for anemia down to 5.3 without signs of bleeding and more likely SCC.    - Transfusion given on admission and improved back to baseline.   - Course complicated by hemorrhagic shock from left renal hematoma and HH down to 3.4.  - s/p 10U PRBC total while in ICU   - s/p PRBC pre-GTUBE with HH increased to 8.0.   - Trend HH  - CT/AP (4/20) Interval placement of gastrostomy tube without evidence of active GI   bleeding, Grossly stable left renal subcapsular hematoma without evidence of   active bleeding, Persistent small bilateral pleural effusions and airspace opacities.    - Monitor HH   -s/p 1 unit PRBC 4/22----hgb 5.9 -> 7.1    # SCD   - Discontinued deferasirox due to current renal failure   - Monitor Sickle labs QD (CBC with Diff, Retic, BMP, Hepatic Function, LDH and Hapto, and VBG).     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   - Heparin GTT held for G TUBE  - Hold resuming and monitor HH  - LUE precautions     # LUE arm infiltration   - HCO3 GTT infilitration in ICU   - Seen by hand sx and no compartment syndrome concern   - Monitor for now     # RUE blood infiltration   - RUE blood transfusion infiltration noted on 4/17 with area of ecchymosis   - RPT DOPPLER with known R IJ DVT, however no upper arm thrombus or issues in area of infiltration  - Monitor site for now    ENDOCRINE   # Glycemic control   - Monitor BG   - No hx of DM2     SKIN   - Wound care reccs appreciated    ETHICS   - FULL CODE     DISPO - TBD

## 2025-04-23 NOTE — CONSULT NOTE ADULT - SUBJECTIVE AND OBJECTIVE BOX
PEDIATRIC GENERAL SURGERY CONSULT NOTE    TOLU VARGAS  |  [id number]   |   44yMale   |   [facility] [address]      Patient is a 44y old  Male who presents with a chief complaint of Referred by Hematologist for low Hg (2025 07:29)      HPI:  44M with PMHx of Sickle cell disease (last transfusion/exchange transfusion 2024), prior acute chest syndrome, iron overload, Gout, HTN and RUE DVT 2024 on eliquis (on hold since 3/15 for upcoming cystoscopy, ureteroscopy on 3/19) who was sent in by his Hematologist for transfusion/low Hg prior to procedure (Hg of 5.3). Patient c/o back/LE pain at time of visit secondary to being on sickle cell crisis. Denies SOB or chest pain. No recent fevers, chills or sick contacts. No cough. No nausea/vomiting or abdominal pain. No melena or BRBPR. No urinary complaints, denies hematuria. Good appetite.    In ER /85, , RR 18, Temp 98.7F, O2 Sat 88% RA-placed on 4L NC  Received Tylenol IV, benadryl 50mg IV, dilaudid 2mg IV x3, 1U PRBC (15 Mar 2025 09:54)      PAST MEDICAL & SURGICAL HISTORY:  Sickle cell anemia      Gout      Deep vein thrombosis (DVT)      Iron overload      Hypertension      History of cholecystectomy      History of removal of Port-a-Cath        FAMILY HISTORY:  Family history of sickle cell trait (Father, Mother)    Patient's father is  (Father)    Patient's mother is  (Mother)      SOCIAL HISTORY:  Vaccination Status:     MEDICATIONS  (STANDING):  albuterol/ipratropium for Nebulization 3 milliLiter(s) Nebulizer every 6 hours  amLODIPine   Tablet 5 milliGRAM(s) Oral daily  bromocriptine Tablet 2.5 milliGRAM(s) Oral every 8 hours  chlorhexidine 0.12% Liquid 15 milliLiter(s) Oral Mucosa two times a day  chlorhexidine 2% Cloths 1 Application(s) Topical daily  clonazePAM  Tablet 2 milliGRAM(s) Oral every 8 hours  influenza   Vaccine 0.5 milliLiter(s) IntraMuscular once  levETIRAcetam  Solution 500 milliGRAM(s) Oral two times a day  levoFLOXacin IVPB 750 milliGRAM(s) IV Intermittent every 24 hours  pantoprazole   Suspension 40 milliGRAM(s) Oral every 12 hours  sodium bicarbonate 1300 milliGRAM(s) Oral three times a day    MEDICATIONS  (PRN):  HYDROmorphone  Injectable 0.5 milliGRAM(s) IV Push every 4 hours PRN dysynchronny    Allergies    penicillin (Pruritus)  hydroxyurea (Other)  piperacillin-tazobactam (Urticaria)  ceftriaxone (Anaphylaxis)    Intolerances        Vital Signs Last 24 Hrs  T(C): 36.9 (2025 16:00), Max: 38.6 (2025 19:30)  T(F): 98.4 (2025 16:00), Max: 101.4 (2025 19:30)  HR: 100 (2025 16:52) (95 - 122)  BP: 158/93 (2025 16:00) (158/93 - 184/92)  BP(mean): 115 (2025 08:00) (111 - 116)  RR: 22 (2025 16:00) (20 - 23)  SpO2: 100% (2025 16:52) (98% - 100%)    Parameters below as of 2025 16:52  Patient On (Oxygen Delivery Method): ventilator        PHYSICAL EXAMINATION  General: NAD   HEENT: Scleral icterus and trach present  Cards: S1/S2, no murmurs   Pulm: Course vent sounds bilaterally. No wheezes.   Abdomen: Soft, nondistended and nontender.   Extremities: No pedal edema. No active SABINO of BL upper and lower extremities. Bicep activation noted with noxious stimuli.   Neurology: Eyes open intermittently but does not follow commands with HIE with no acute focal neurological deficits                          7.1    23.18 )-----------( 359      ( 2025 06:26 )             21.2     04-23    143  |  110[H]  |  54[H]  ----------------------------<  137[H]  3.9   |  20[L]  |  1.68[H]    Ca    9.3      2025 06:26  Phos  2.9     04-23  Mg     1.90     04-23    TPro  7.1  /  Alb  2.6[L]  /  TBili  3.8[H]  /  DBili  2.8[H]  /  AST  89[H]  /  ALT  41  /  AlkPhos  311[H]  04-      Urinalysis Basic - ( 2025 08:00 )    Color: Dark Yellow / Appearance: Cloudy / S.017 / pH: x  Gluc: x / Ketone: Negative mg/dL  / Bili: Negative / Urobili: 0.2 mg/dL   Blood: x / Protein: 100 mg/dL / Nitrite: Negative   Leuk Esterase: Moderate / RBC: 10 /HPF /  /HPF   Sq Epi: x / Non Sq Epi: 1 /HPF / Bacteria: Moderate /HPF         PEDIATRIC GENERAL SURGERY CONSULT NOTE    TOLU VARGAS  |  [id number]   |   44yMale   |   [facility]      Patient is a 44y old  Male who presents with a chief complaint of Referred by Hematologist for low Hg (2025 07:29)      HPI:  44M with PMHx of Sickle cell disease (last transfusion/exchange transfusion 2024), prior acute chest syndrome, iron overload, Gout, HTN and RUE DVT 2024 on eliquis (on hold since 3/15 for upcoming cystoscopy, ureteroscopy on 3/19) who was sent in by his Hematologist for transfusion/low Hg prior to procedure (Hg of 5.3). Patient c/o back/LE pain at time of visit secondary to being on sickle cell crisis. Denies SOB or chest pain. No recent fevers, chills or sick contacts. No cough. No nausea/vomiting or abdominal pain. No melena or BRBPR. No urinary complaints, denies hematuria. Good appetite.    In ER /85, , RR 18, Temp 98.7F, O2 Sat 88% RA-placed on 4L NC  Received Tylenol IV, benadryl 50mg IV, dilaudid 2mg IV x3, 1U PRBC (15 Mar 2025 09:54)    OMFS consulted to help place a bite block for patient.     PAST MEDICAL & SURGICAL HISTORY:  Sickle cell anemia      Gout      Deep vein thrombosis (DVT)      Iron overload      Hypertension      History of cholecystectomy      History of removal of Port-a-Cath        FAMILY HISTORY:  Family history of sickle cell trait (Father, Mother)    Patient's father is  (Father)    Patient's mother is  (Mother)      SOCIAL HISTORY:  Vaccination Status:     MEDICATIONS  (STANDING):  albuterol/ipratropium for Nebulization 3 milliLiter(s) Nebulizer every 6 hours  amLODIPine   Tablet 5 milliGRAM(s) Oral daily  bromocriptine Tablet 2.5 milliGRAM(s) Oral every 8 hours  chlorhexidine 0.12% Liquid 15 milliLiter(s) Oral Mucosa two times a day  chlorhexidine 2% Cloths 1 Application(s) Topical daily  clonazePAM  Tablet 2 milliGRAM(s) Oral every 8 hours  influenza   Vaccine 0.5 milliLiter(s) IntraMuscular once  levETIRAcetam  Solution 500 milliGRAM(s) Oral two times a day  levoFLOXacin IVPB 750 milliGRAM(s) IV Intermittent every 24 hours  pantoprazole   Suspension 40 milliGRAM(s) Oral every 12 hours  sodium bicarbonate 1300 milliGRAM(s) Oral three times a day    MEDICATIONS  (PRN):  HYDROmorphone  Injectable 0.5 milliGRAM(s) IV Push every 4 hours PRN dysynchronny    Allergies    penicillin (Pruritus)  hydroxyurea (Other)  piperacillin-tazobactam (Urticaria)  ceftriaxone (Anaphylaxis)    Intolerances        Vital Signs Last 24 Hrs  T(C): 36.9 (2025 16:00), Max: 38.6 (2025 19:30)  T(F): 98.4 (2025 16:00), Max: 101.4 (2025 19:30)  HR: 100 (2025 16:52) (95 - 122)  BP: 158/93 (2025 16:00) (158/93 - 184/92)  BP(mean): 115 (2025 08:00) (111 - 116)  RR: 22 (2025 16:00) (20 - 23)  SpO2: 100% (2025 16:52) (98% - 100%)    Parameters below as of 2025 16:52  Patient On (Oxygen Delivery Method): ventilator        PHYSICAL EXAMINATION  General: NAD   HEENT: Scleral icterus and trach present  Intraoral: Patient with white thrush-like dorsal tongue, no findings of bleeding from tongue. Patient automatically bites down.   Cards: S1/S2, no murmurs   Pulm: Course vent sounds bilaterally. No wheezes.   Abdomen: Soft, nondistended and nontender.   Extremities: No pedal edema. No active SABINO of BL upper and lower extremities. Bicep activation noted with noxious stimuli.   Neurology: Eyes open intermittently but does not follow commands with HIE with no acute focal neurological deficits                          7.1    23.18 )-----------( 359      ( 2025 06:26 )             21.2     04-    143  |  110[H]  |  54[H]  ----------------------------<  137[H]  3.9   |  20[L]  |  1.68[H]    Ca    9.3      2025 06:26  Phos  2.9       Mg     1.90         TPro  7.1  /  Alb  2.6[L]  /  TBili  3.8[H]  /  DBili  2.8[H]  /  AST  89[H]  /  ALT  41  /  AlkPhos  311[H]        Urinalysis Basic - ( 2025 08:00 )    Color: Dark Yellow / Appearance: Cloudy / S.017 / pH: x  Gluc: x / Ketone: Negative mg/dL  / Bili: Negative / Urobili: 0.2 mg/dL   Blood: x / Protein: 100 mg/dL / Nitrite: Negative   Leuk Esterase: Moderate / RBC: 10 /HPF /  /HPF   Sq Epi: x / Non Sq Epi: 1 /HPF / Bacteria: Moderate /HPF         Oral and Maxillofacial SURGERY CONSULT NOTE    TOLU VARGAS  |  [id number]   |   44yMale   |   [facility]      Patient is a 44y old  Male who presents with a chief complaint of Referred by Hematologist for low Hg (2025 07:29)      HPI:  44M with PMHx of Sickle cell disease (last transfusion/exchange transfusion 2024), prior acute chest syndrome, iron overload, Gout, HTN and RUE DVT 2024 on eliquis (on hold since 3/15 for upcoming cystoscopy, ureteroscopy on 3/19) who was sent in by his Hematologist for transfusion/low Hg prior to procedure (Hg of 5.3). Patient c/o back/LE pain at time of visit secondary to being on sickle cell crisis. Denies SOB or chest pain. No recent fevers, chills or sick contacts. No cough. No nausea/vomiting or abdominal pain. No melena or BRBPR. No urinary complaints, denies hematuria. Good appetite.    In ER /85, , RR 18, Temp 98.7F, O2 Sat 88% RA-placed on 4L NC  Received Tylenol IV, benadryl 50mg IV, dilaudid 2mg IV x3, 1U PRBC (15 Mar 2025 09:54)  Course complicated with hypoglycemia on 3/20 with RRT x 2 and found with severe anemia to 3.4 with hemorrhagic shock second to left perirenal and subcapsular hemorrhage and ultimately PEA arrest. ROSC achieved and transferred to MICU. s/p IR emobilization and course complicated anoxic brain injury, myoclonic jerks, GIB, MATA, HAGMA, DVTs (R IJ and LUE brachial), dysphagia, cirrhosis, and prolonged vent time s/p trach on . Transferred to RCU on .         OMFS consulted to help place a bite block for patient after patient found to biting down his tongue.     PAST MEDICAL & SURGICAL HISTORY:  Sickle cell anemia      Gout      Deep vein thrombosis (DVT)      Iron overload      Hypertension      History of cholecystectomy      History of removal of Port-a-Cath        FAMILY HISTORY:  Family history of sickle cell trait (Father, Mother)    Patient's father is  (Father)    Patient's mother is  (Mother)      SOCIAL HISTORY:  Vaccination Status:     MEDICATIONS  (STANDING):  albuterol/ipratropium for Nebulization 3 milliLiter(s) Nebulizer every 6 hours  amLODIPine   Tablet 5 milliGRAM(s) Oral daily  bromocriptine Tablet 2.5 milliGRAM(s) Oral every 8 hours  chlorhexidine 0.12% Liquid 15 milliLiter(s) Oral Mucosa two times a day  chlorhexidine 2% Cloths 1 Application(s) Topical daily  clonazePAM  Tablet 2 milliGRAM(s) Oral every 8 hours  influenza   Vaccine 0.5 milliLiter(s) IntraMuscular once  levETIRAcetam  Solution 500 milliGRAM(s) Oral two times a day  levoFLOXacin IVPB 750 milliGRAM(s) IV Intermittent every 24 hours  pantoprazole   Suspension 40 milliGRAM(s) Oral every 12 hours  sodium bicarbonate 1300 milliGRAM(s) Oral three times a day    MEDICATIONS  (PRN):  HYDROmorphone  Injectable 0.5 milliGRAM(s) IV Push every 4 hours PRN dysynchronny    Allergies    penicillin (Pruritus)  hydroxyurea (Other)  piperacillin-tazobactam (Urticaria)  ceftriaxone (Anaphylaxis)    Intolerances        Vital Signs Last 24 Hrs  T(C): 36.9 (2025 16:00), Max: 38.6 (2025 19:30)  T(F): 98.4 (2025 16:00), Max: 101.4 (2025 19:30)  HR: 100 (2025 16:52) (95 - 122)  BP: 158/93 (2025 16:00) (158/93 - 184/92)  BP(mean): 115 (2025 08:00) (111 - 116)  RR: 22 (2025 16:00) (20 - 23)  SpO2: 100% (2025 16:52) (98% - 100%)    Parameters below as of 2025 16:52  Patient On (Oxygen Delivery Method): ventilator        PHYSICAL EXAMINATION  General: NAD   HEENT: Scleral icterus and trach present  Intraoral: Patient with white thrush-like appearance of dorsal tongue, no findings of bleeding from tongue. Patient found with inner lip slightly bitten.  Cards: S1/S2, no murmurs   Pulm: Course vent sounds bilaterally. No wheezes.   Abdomen: Soft, nondistended and nontender.   Extremities: No pedal edema. No active SABINO of BL upper and lower extremities. Bicep activation noted with noxious stimuli.   Neurology: Eyes open intermittently but does not follow commands with HIE with no acute focal neurological deficits                          7.1    23.18 )-----------( 359      ( 2025 06:26 )             21.2         143  |  110[H]  |  54[H]  ----------------------------<  137[H]  3.9   |  20[L]  |  1.68[H]    Ca    9.3      2025 06:26  Phos  2.9       Mg     1.90         TPro  7.1  /  Alb  2.6[L]  /  TBili  3.8[H]  /  DBili  2.8[H]  /  AST  89[H]  /  ALT  41  /  AlkPhos  311[H]        Urinalysis Basic - ( 2025 08:00 )    Color: Dark Yellow / Appearance: Cloudy / S.017 / pH: x  Gluc: x / Ketone: Negative mg/dL  / Bili: Negative / Urobili: 0.2 mg/dL   Blood: x / Protein: 100 mg/dL / Nitrite: Negative   Leuk Esterase: Moderate / RBC: 10 /HPF /  /HPF   Sq Epi: x / Non Sq Epi: 1 /HPF / Bacteria: Moderate /HPF

## 2025-04-23 NOTE — PROGRESS NOTE ADULT - SUBJECTIVE AND OBJECTIVE BOX
CHIEF COMPLAINT: Patient is a 44y old  Male who presents with a chief complaint of Referred by Hematologist for low Hg      INTERVAL EVENTS: Patient spiked fever overnight , repeat CBC post transfusion hgb : 5.9-> 7.0    ROS: Seen by bedside during AM rounds     OBJECTIVE:  ICU Vital Signs Last 24 Hrs  T(C): 37.6 (23 Apr 2025 06:00), Max: 38.6 (22 Apr 2025 19:30)  T(F): 99.6 (23 Apr 2025 06:00), Max: 101.4 (22 Apr 2025 19:30)  HR: 115 (23 Apr 2025 06:00) (95 - 122)  BP: 184/92 (23 Apr 2025 06:00) (147/86 - 184/92)  BP(mean): 116 (23 Apr 2025 00:00) (111 - 116)  ABP: --  ABP(mean): --  RR: 22 (23 Apr 2025 06:00) (20 - 23)  SpO2: 98% (23 Apr 2025 06:00) (98% - 100%)    O2 Parameters below as of 23 Apr 2025 06:00  Patient On (Oxygen Delivery Method): ventilator    O2 Concentration (%): 50      Mode: AC/ CMV (Assist Control/ Continuous Mandatory Ventilation), RR (machine): 20, TV (machine): 460, FiO2: 30, PEEP: 6, ITime: 0.8, MAP: 9, PIP: 25    04-22 @ 07:01  -  04-23 @ 07:00  --------------------------------------------------------  IN: 2355 mL / OUT: 3225 mL / NET: -870 mL      CAPILLARY BLOOD GLUCOSE      POCT Blood Glucose.: 137 mg/dL (22 Apr 2025 21:15)      PHYSICAL EXAMINATION  General: NAD   HEENT: Scleral icterus and trach present  Cards: S1/S2, no murmurs   Pulm: Course vent sounds bilaterally. No wheezes.   Abdomen: Soft, nondistended and nontender.   Extremities: No pedal edema. No active SABINO of BL upper and lower extremities. Bicep activation noted with noxious stimuli.   Neurology: Eyes open intermittently but does not follow commands with HIE with no acute focal neurological deficits    Mode: AC/ CMV (Assist Control/ Continuous Mandatory Ventilation)  RR (machine): 20  TV (machine): 460  FiO2: 30  PEEP: 6  ITime: 0.8  MAP: 9  PIP: 25      HOSPITAL MEDICATIONS:  MEDICATIONS  (STANDING):  albuterol/ipratropium for Nebulization 3 milliLiter(s) Nebulizer every 6 hours  chlorhexidine 0.12% Liquid 15 milliLiter(s) Oral Mucosa two times a day  chlorhexidine 2% Cloths 1 Application(s) Topical daily  clonazePAM  Tablet 2 milliGRAM(s) Oral every 8 hours  influenza   Vaccine 0.5 milliLiter(s) IntraMuscular once  levETIRAcetam  Solution 500 milliGRAM(s) Oral two times a day  pantoprazole   Suspension 40 milliGRAM(s) Oral every 12 hours  sodium bicarbonate 1300 milliGRAM(s) Oral three times a day    MEDICATIONS  (PRN):  HYDROmorphone  Injectable 0.5 milliGRAM(s) IV Push every 4 hours PRN dysynchronny      LABS:                        7.1    23.18 )-----------( 359      ( 23 Apr 2025 06:26 )             21.2     04-22    144  |  113[H]  |  54[H]  ----------------------------<  135[H]  4.1   |  21[L]  |  1.86[H]    Ca    9.0      22 Apr 2025 05:55  Phos  3.4     04-22  Mg     2.00     04-22    TPro  7.1  /  Alb  2.6[L]  /  TBili  3.8[H]  /  DBili  2.8[H]  /  AST  89[H]  /  ALT  41  /  AlkPhos  311[H]  04-22      Urinalysis Basic - ( 22 Apr 2025 05:55 )    Color: x / Appearance: x / SG: x / pH: x  Gluc: 135 mg/dL / Ketone: x  / Bili: x / Urobili: x   Blood: x / Protein: x / Nitrite: x   Leuk Esterase: x / RBC: x / WBC x   Sq Epi: x / Non Sq Epi: x / Bacteria: x        Venous Blood Gas:  04-23 @ 06:26  7.43/34/77/23/96.9  VBG Lactate: 1.5   CHIEF COMPLAINT: Patient is a 44y old  Male who was Referred by Hematologist for low Hg      INTERVAL EVENTS: Patient spiked fever overnight , repeat CBC post transfusion hgb : 5.9-> 7.0    ROS: Seen by bedside during AM rounds     OBJECTIVE:  ICU Vital Signs Last 24 Hrs  T(C): 37.6 (23 Apr 2025 06:00), Max: 38.6 (22 Apr 2025 19:30)  T(F): 99.6 (23 Apr 2025 06:00), Max: 101.4 (22 Apr 2025 19:30)  HR: 115 (23 Apr 2025 06:00) (95 - 122)  BP: 184/92 (23 Apr 2025 06:00) (147/86 - 184/92)  BP(mean): 116 (23 Apr 2025 00:00) (111 - 116)  ABP: --  ABP(mean): --  RR: 22 (23 Apr 2025 06:00) (20 - 23)  SpO2: 98% (23 Apr 2025 06:00) (98% - 100%)    O2 Parameters below as of 23 Apr 2025 06:00  Patient On (Oxygen Delivery Method): ventilator    O2 Concentration (%): 50      Mode: AC/ CMV (Assist Control/ Continuous Mandatory Ventilation), RR (machine): 20, TV (machine): 460, FiO2: 30, PEEP: 6, ITime: 0.8, MAP: 9, PIP: 25    04-22 @ 07:01  -  04-23 @ 07:00  --------------------------------------------------------  IN: 2355 mL / OUT: 3225 mL / NET: -870 mL      CAPILLARY BLOOD GLUCOSE      POCT Blood Glucose.: 137 mg/dL (22 Apr 2025 21:15)      PHYSICAL EXAMINATION  General: NAD   HEENT: Scleral icterus and trach present  Cards: S1/S2, no murmurs   Pulm: Course vent sounds bilaterally. No wheezes.   Abdomen: Soft, nondistended and nontender.   Extremities: No pedal edema. No active SABINO of BL upper and lower extremities. Bicep activation noted with noxious stimuli.   Neurology: Eyes open intermittently but does not follow commands with HIE with no acute focal neurological deficits    Mode: AC/ CMV (Assist Control/ Continuous Mandatory Ventilation)  RR (machine): 20  TV (machine): 460  FiO2: 30  PEEP: 6  ITime: 0.8  MAP: 9  PIP: 25      HOSPITAL MEDICATIONS:  MEDICATIONS  (STANDING):  albuterol/ipratropium for Nebulization 3 milliLiter(s) Nebulizer every 6 hours  chlorhexidine 0.12% Liquid 15 milliLiter(s) Oral Mucosa two times a day  chlorhexidine 2% Cloths 1 Application(s) Topical daily  clonazePAM  Tablet 2 milliGRAM(s) Oral every 8 hours  influenza   Vaccine 0.5 milliLiter(s) IntraMuscular once  levETIRAcetam  Solution 500 milliGRAM(s) Oral two times a day  pantoprazole   Suspension 40 milliGRAM(s) Oral every 12 hours  sodium bicarbonate 1300 milliGRAM(s) Oral three times a day    MEDICATIONS  (PRN):  HYDROmorphone  Injectable 0.5 milliGRAM(s) IV Push every 4 hours PRN dysynchronny      LABS:                        7.1    23.18 )-----------( 359      ( 23 Apr 2025 06:26 )             21.2     04-22    144  |  113[H]  |  54[H]  ----------------------------<  135[H]  4.1   |  21[L]  |  1.86[H]    Ca    9.0      22 Apr 2025 05:55  Phos  3.4     04-22  Mg     2.00     04-22    TPro  7.1  /  Alb  2.6[L]  /  TBili  3.8[H]  /  DBili  2.8[H]  /  AST  89[H]  /  ALT  41  /  AlkPhos  311[H]  04-22      Urinalysis Basic - ( 22 Apr 2025 05:55 )    Color: x / Appearance: x / SG: x / pH: x  Gluc: 135 mg/dL / Ketone: x  / Bili: x / Urobili: x   Blood: x / Protein: x / Nitrite: x   Leuk Esterase: x / RBC: x / WBC x   Sq Epi: x / Non Sq Epi: x / Bacteria: x        Venous Blood Gas:  04-23 @ 06:26  7.43/34/77/23/96.9  VBG Lactate: 1.5

## 2025-04-23 NOTE — PROGRESS NOTE ADULT - NS ATTEND AMEND GEN_ALL_CORE FT
44 year old male with a history of SSD, UE DVT, gout, HTN, with L renal mass admitted with acute anemia/ sickle cell crisis and evaluation of his left renal mass s/p left ureteroscopy and biopsy (3/19/25) with 24 hour post operative course complicated by rapid responses for hypoglycemia c/b cardiac arrest x 4 minutes CPR/1 epi with ROSC, transferred to MICU for further care     Post rosc found to be profoundly anemic, acidemic, with multiorgan dysfunction overall concerning for acute hemorrhagic shock.   Now with anoxia on CTH as well as MRI. Also with myclonus.  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     Patient after long GOC with family is now s/p trach and peg. H/H low but stable in setting of SSD. CT without source of bleeding. No longer requiring HD.     Having on and off fevers,. Possibly central fevers.     # Cardiac arrest  # Anoxic encephalopathy  # Myoclonus  # Anemia/SSD  # Transaminitis  # MATA 2/2 to ATN  # DVT  # Hyperbilirubinemia, Early cirrhosis?  # Urothelial Cancer.   # Fever  - Cardiac arrest, short down time but with likely anoxic injury and multiorgan dysfunction in setting of hemorrhage, time of arrest Hg was 3 and hypoglycemia  - Repeat CT and MRI showing diffuse anoxia. Clinically doing poorly. Simulation myoclonus. C/W PO benzos as tolerated. PRN opoids.   - C/W vent, patient unlikely to be wean from the ventilator.   - Transfuse PRN, s/p embolization by IR. Hematoma stable on CT scan. Not tolerating full a/c.   - DVT in the upper ext likely related with catheter.  - SSD, monitor hemolysis labs. SS labs  - LFT overall downtrending. continue to mointor.   - MATA. s/p HD. Monitor urine output. Trend Cr.   - S/P courses of abx, now with recurrent fever. Will treat with FQ for positive UA. F/U blood cultures. Started bromocryptine for possible central fevers  - Hand lesions. Initially consulted vascular now vascular recommending plastics/hand--> no surgical interventions.   - Biopsy showing Non-invasive papillary urothelial carcinoma. Not a candidate for DMT per Onc.   - DVT ppx- SCD for now.   - Dispo- full code. Overall prognosis is poor given severe anoxia and untreatable cancer.

## 2025-04-23 NOTE — CONSULT NOTE ADULT - ASSESSMENT
45 YO M with PMHx of sickle cell disease with prior acute chest syndrome requiring transfusion and exchange in the past (last 12/2024), iron overload, gout, HTN, and RUE DVT in 12/2024 on eliquis who presented on 3/15 by his hematologist second to anemia and hypoxia, down to 5.3 from baseline 7-8, with concern for vaso-occlusive crisis. While on medicine, anemia improved post transfusion and continued on SCC pain control PCA. Patient noted with left renal mass during prior admission and s/p L ureteroscopy with biopsy and stent placement on 3/19. Course complicated with hypoglycemia on 3/20 with RRT x 2 and found with severe anemia to 3.4 with hemorrhagic shock second to left perirenal and subcapsular hemorrhage and ultimately PEA arrest. ROSC achieved and transferred to MICU. s/p IR emobilization and course complicated anoxic brain injury, myoclonic jerks, GIB, MATA, HAGMA, DVTs (R IJ and LUE brachial), dysphagia, cirrhosis, and prolonged vent time s/p trach on 4/4. Transferred to [facility] on 4/18.     Jaw was shut tight with anterior left tongue being bitten down.    Plan:   RCU requested help for placement of bite block d/t tongue biting. Bite block with string attached to avoid swallowing.  Recommend extraction of all teeth to avoid further tongue trauma.   Recommend dental consult for creation of mouthguard that patient can wear.    Bite block recommended to stay a few days or be removed completely before patient heads to facility.  43 YO M with PMHx of sickle cell disease with prior acute chest syndrome requiring transfusion and exchange in the past (last 12/2024), iron overload, gout, HTN, and RUE DVT in 12/2024 on eliquis who presented on 3/15 by his hematologist second to anemia and hypoxia, down to 5.3 from baseline 7-8, with concern for vaso-occlusive crisis. While on medicine, anemia improved post transfusion and continued on SCC pain control PCA. Patient noted with left renal mass during prior admission and s/p L ureteroscopy with biopsy and stent placement on 3/19. Course complicated with hypoglycemia on 3/20 with RRT x 2 and found with severe anemia to 3.4 with hemorrhagic shock second to left perirenal and subcapsular hemorrhage and ultimately PEA arrest. ROSC achieved and transferred to MICU. s/p IR emobilization and course complicated anoxic brain injury, myoclonic jerks, GIB, MATA, HAGMA, DVTs (R IJ and LUE brachial), dysphagia, cirrhosis, and prolonged vent time s/p trach on 4/4. Transferred to [facility] on 4/18.     Jaw was shut tight with anterior left tongue being bitten down.    Plan:   RCU requested help for placement of bite block d/t tongue biting. Bite block with string attached to avoid swallowing.  Recommend dental consult for creation of mouthguard that patient can wear.      Bite block recommended to stay a few days or be removed completely before patient heads to facility.  43 YO M with PMHx of sickle cell disease with prior acute chest syndrome requiring transfusion and exchange in the past (last 12/2024), iron overload, gout, HTN, and RUE DVT in 12/2024 on eliquis who presented on 3/15 by his hematologist second to anemia and hypoxia, down to 5.3 from baseline 7-8, with concern for vaso-occlusive crisis. While on medicine, anemia improved post transfusion and continued on SCC pain control PCA. Patient noted with left renal mass during prior admission and s/p L ureteroscopy with biopsy and stent placement on 3/19. Course complicated with hypoglycemia on 3/20 with RRT x 2 and found with severe anemia to 3.4 with hemorrhagic shock second to left perirenal and subcapsular hemorrhage and ultimately PEA arrest. ROSC achieved and transferred to MICU. s/p IR emobilization and course complicated anoxic brain injury, myoclonic jerks, GIB, MATA, HAGMA, DVTs (R IJ and LUE brachial), dysphagia, cirrhosis, and prolonged vent time s/p trach on 4/4. Transferred to [facility] on 4/18.     OMFS consulted for help supplying bite block into patient's mouth to avoid biting tongue. Bite block placed with the use of MOLT.   Bite block recommended to stay a few days or be removed completely before patient heads to facility.     Plan:   RCU requested help for placement of bite block d/t tongue biting. Bite block with catheter string attached by NP to avoid patient swallowing bite block.   Bite block is a Temporary solution. Patient will benefit from dental consult for creation of a mouthguard that can be place to avoid biting tongue.   Patient may push bite block out of the mouth, at which point patient may not be tolerable to a bite block.  For oral thrush, fungal biopsy follow by Nystatin rinse recommended  43 YO M with PMHx of sickle cell disease with prior acute chest syndrome requiring transfusion and exchange in the past (last 12/2024), iron overload, gout, HTN, and RUE DVT in 12/2024 on eliquis who presented on 3/15 by his hematologist second to anemia and hypoxia, down to 5.3 from baseline 7-8, with concern for vaso-occlusive crisis. While on medicine, anemia improved post transfusion and continued on SCC pain control PCA. Patient noted with left renal mass during prior admission and s/p L ureteroscopy with biopsy and stent placement on 3/19. Course complicated with hypoglycemia on 3/20 with RRT x 2 and found with severe anemia to 3.4 with hemorrhagic shock second to left perirenal and subcapsular hemorrhage and ultimately PEA arrest. ROSC achieved and transferred to MICU. s/p IR emobilization and course complicated anoxic brain injury, myoclonic jerks, GIB, MATA, HAGMA, DVTs (R IJ and LUE brachial), dysphagia, cirrhosis, and prolonged vent time s/p trach on 4/4. Transferred to [facility] on 4/18.     OMFS consulted for help placing bite block into patient's mouth to avoid biting tongue. Bite block placed with the use of MOLT.   Bite block recommended to stay a few days or be removed completely before patient heads to facility.     Plan:   RCU requested help for placement of bite block d/t tongue biting. Bite block with catheter string attached by NP to avoid patient swallowing bite block.   Bite block is a Temporary solution, to be removed when tongue heals. Patient will benefit from dental consult for creation of a mouthguard that can be place to avoid biting tongue.   Patient may push bite block out of the mouth, at which point patient may not be tolerable to a bite block.  For oral thrush, fungal swab follow by Nystatin rinse recommended

## 2025-04-24 LAB
ALBUMIN SERPL ELPH-MCNC: 2.8 G/DL — LOW (ref 3.3–5)
ALP SERPL-CCNC: 476 U/L — HIGH (ref 40–120)
ALT FLD-CCNC: 80 U/L — HIGH (ref 4–41)
ANION GAP SERPL CALC-SCNC: 13 MMOL/L — SIGNIFICANT CHANGE UP (ref 7–14)
AST SERPL-CCNC: 141 U/L — HIGH (ref 4–40)
BILIRUB DIRECT SERPL-MCNC: 2.8 MG/DL — HIGH (ref 0–0.3)
BILIRUB INDIRECT FLD-MCNC: 0.9 MG/DL — SIGNIFICANT CHANGE UP (ref 0–1)
BILIRUB SERPL-MCNC: 3.7 MG/DL — HIGH (ref 0.2–1.2)
BUN SERPL-MCNC: 53 MG/DL — HIGH (ref 7–23)
CALCIUM SERPL-MCNC: 9.6 MG/DL — SIGNIFICANT CHANGE UP (ref 8.4–10.5)
CHLORIDE SERPL-SCNC: 110 MMOL/L — HIGH (ref 98–107)
CO2 SERPL-SCNC: 21 MMOL/L — LOW (ref 22–31)
CREAT SERPL-MCNC: 1.59 MG/DL — HIGH (ref 0.5–1.3)
EGFR: 54 ML/MIN/1.73M2 — LOW
EGFR: 54 ML/MIN/1.73M2 — LOW
GAS PNL BLDV: SIGNIFICANT CHANGE UP
GLUCOSE BLDC GLUCOMTR-MCNC: 128 MG/DL — HIGH (ref 70–99)
GLUCOSE BLDC GLUCOMTR-MCNC: 140 MG/DL — HIGH (ref 70–99)
GLUCOSE SERPL-MCNC: 138 MG/DL — HIGH (ref 70–99)
GRAM STN FLD: ABNORMAL
HAPTOGLOB SERPL-MCNC: 52 MG/DL — SIGNIFICANT CHANGE UP (ref 34–200)
HCT VFR BLD CALC: 23.7 % — LOW (ref 39–50)
HGB BLD-MCNC: 7.8 G/DL — LOW (ref 13–17)
LDH SERPL L TO P-CCNC: 297 U/L — HIGH (ref 135–225)
MAGNESIUM SERPL-MCNC: 2 MG/DL — SIGNIFICANT CHANGE UP (ref 1.6–2.6)
MCHC RBC-ENTMCNC: 29.5 PG — SIGNIFICANT CHANGE UP (ref 27–34)
MCHC RBC-ENTMCNC: 32.9 G/DL — SIGNIFICANT CHANGE UP (ref 32–36)
MCV RBC AUTO: 89.8 FL — SIGNIFICANT CHANGE UP (ref 80–100)
NRBC # BLD AUTO: 0.02 K/UL — HIGH (ref 0–0)
NRBC # FLD: 0.02 K/UL — HIGH (ref 0–0)
NRBC BLD AUTO-RTO: 0 /100 WBCS — SIGNIFICANT CHANGE UP (ref 0–0)
NRBC BLD AUTO-RTO: 0 /100 WBCS — SIGNIFICANT CHANGE UP (ref 0–0)
PHOSPHATE SERPL-MCNC: 3.1 MG/DL — SIGNIFICANT CHANGE UP (ref 2.5–4.5)
PLATELET # BLD AUTO: 385 K/UL — SIGNIFICANT CHANGE UP (ref 150–400)
POTASSIUM SERPL-MCNC: 3.9 MMOL/L — SIGNIFICANT CHANGE UP (ref 3.5–5.3)
POTASSIUM SERPL-SCNC: 3.9 MMOL/L — SIGNIFICANT CHANGE UP (ref 3.5–5.3)
PROT SERPL-MCNC: 7.8 G/DL — SIGNIFICANT CHANGE UP (ref 6–8.3)
RBC # BLD: 2.64 M/UL — LOW (ref 4.2–5.8)
RBC # FLD: 17.8 % — HIGH (ref 10.3–14.5)
SODIUM SERPL-SCNC: 144 MMOL/L — SIGNIFICANT CHANGE UP (ref 135–145)
SPECIMEN SOURCE: SIGNIFICANT CHANGE UP
WBC # BLD: 21.29 K/UL — HIGH (ref 3.8–10.5)
WBC # FLD AUTO: 21.29 K/UL — HIGH (ref 3.8–10.5)

## 2025-04-24 PROCEDURE — 99233 SBSQ HOSP IP/OBS HIGH 50: CPT

## 2025-04-24 RX ORDER — HEPARIN SODIUM 1000 [USP'U]/ML
5000 INJECTION INTRAVENOUS; SUBCUTANEOUS EVERY 12 HOURS
Refills: 0 | Status: DISCONTINUED | OUTPATIENT
Start: 2025-04-24 | End: 2025-05-16

## 2025-04-24 RX ORDER — CLONAZEPAM 0.5 MG/1
2 TABLET ORAL EVERY 8 HOURS
Refills: 0 | Status: DISCONTINUED | OUTPATIENT
Start: 2025-04-24 | End: 2025-05-01

## 2025-04-24 RX ORDER — HYDROMORPHONE/SOD CHLOR,ISO/PF 2 MG/10 ML
0.5 SYRINGE (ML) INJECTION EVERY 4 HOURS
Refills: 0 | Status: DISCONTINUED | OUTPATIENT
Start: 2025-04-24 | End: 2025-05-01

## 2025-04-24 RX ADMIN — LEVETIRACETAM 500 MILLIGRAM(S): 10 INJECTION, SOLUTION INTRAVENOUS at 04:42

## 2025-04-24 RX ADMIN — Medication 1300 MILLIGRAM(S): at 21:26

## 2025-04-24 RX ADMIN — CLONAZEPAM 2 MILLIGRAM(S): 0.5 TABLET ORAL at 13:01

## 2025-04-24 RX ADMIN — CLONAZEPAM 2 MILLIGRAM(S): 0.5 TABLET ORAL at 04:42

## 2025-04-24 RX ADMIN — Medication 2.5 MILLIGRAM(S): at 21:26

## 2025-04-24 RX ADMIN — Medication 1300 MILLIGRAM(S): at 04:42

## 2025-04-24 RX ADMIN — Medication 2.5 MILLIGRAM(S): at 04:42

## 2025-04-24 RX ADMIN — IPRATROPIUM BROMIDE AND ALBUTEROL SULFATE 3 MILLILITER(S): .5; 2.5 SOLUTION RESPIRATORY (INHALATION) at 04:42

## 2025-04-24 RX ADMIN — Medication 1 APPLICATION(S): at 11:32

## 2025-04-24 RX ADMIN — Medication 2.5 MILLIGRAM(S): at 13:01

## 2025-04-24 RX ADMIN — AMLODIPINE BESYLATE 5 MILLIGRAM(S): 10 TABLET ORAL at 04:41

## 2025-04-24 RX ADMIN — Medication 15 MILLILITER(S): at 17:08

## 2025-04-24 RX ADMIN — Medication 40 MILLIGRAM(S): at 04:41

## 2025-04-24 RX ADMIN — Medication 1300 MILLIGRAM(S): at 13:01

## 2025-04-24 RX ADMIN — CLONAZEPAM 2 MILLIGRAM(S): 0.5 TABLET ORAL at 21:26

## 2025-04-24 RX ADMIN — IPRATROPIUM BROMIDE AND ALBUTEROL SULFATE 3 MILLILITER(S): .5; 2.5 SOLUTION RESPIRATORY (INHALATION) at 15:20

## 2025-04-24 RX ADMIN — LEVETIRACETAM 500 MILLIGRAM(S): 10 INJECTION, SOLUTION INTRAVENOUS at 17:07

## 2025-04-24 RX ADMIN — IPRATROPIUM BROMIDE AND ALBUTEROL SULFATE 3 MILLILITER(S): .5; 2.5 SOLUTION RESPIRATORY (INHALATION) at 09:31

## 2025-04-24 RX ADMIN — IPRATROPIUM BROMIDE AND ALBUTEROL SULFATE 3 MILLILITER(S): .5; 2.5 SOLUTION RESPIRATORY (INHALATION) at 21:58

## 2025-04-24 RX ADMIN — Medication 40 MILLIGRAM(S): at 20:00

## 2025-04-24 RX ADMIN — Medication 15 MILLILITER(S): at 04:42

## 2025-04-24 RX ADMIN — HEPARIN SODIUM 5000 UNIT(S): 1000 INJECTION INTRAVENOUS; SUBCUTANEOUS at 17:12

## 2025-04-24 NOTE — PROGRESS NOTE ADULT - NS ATTEND AMEND GEN_ALL_CORE FT
44 year old male with a history of SSD, UE DVT, gout, HTN, with L renal mass admitted with acute anemia/ sickle cell crisis and evaluation of his left renal mass s/p left ureteroscopy and biopsy (3/19/25) with 24 hour post operative course complicated by rapid responses for hypoglycemia c/b cardiac arrest x 4 minutes CPR/1 epi with ROSC, transferred to MICU for further care     Post rosc found to be profoundly anemic, acidemic, with multiorgan dysfunction overall concerning for acute hemorrhagic shock.   Now with anoxia on CTH as well as MRI. Also with myclonus.  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     Patient after long GOC with family is now s/p trach and peg. H/H low but stable in setting of SSD. CT without source of bleeding. No longer requiring HD.     Having on and off fevers,. Possibly central fevers.     # Cardiac arrest  # Anoxic encephalopathy  # Myoclonus  # Anemia/SSD  # Transaminitis  # MATA 2/2 to ATN  # DVT  # Hyperbilirubinemia, Early cirrhosis?  # Urothelial Cancer.   # Fever  # Tongue bitting  - Cardiac arrest, short down time but with likely anoxic injury and multiorgan dysfunction in setting of hemorrhage, time of arrest Hg was 3 and hypoglycemic  - Repeat CT and MRI showing diffuse anoxia. Clinically doing poorly. Simulation myoclonus. C/W PO benzos as tolerated. PRN opoids.   - C/W vent, patient unlikely to be wean from the ventilator.   - Transfuse PRN, s/p embolization by IR. Hematoma stable on CT scan. Not tolerating full a/c.   - DVT in the upper ext likely related with catheter.  - SSD, monitor hemolysis labs. SS labs  - LFT overall downtrending. continue to mointor.   - MATA. s/p HD. Monitor urine output. Trend Cr.   - S/P courses of abx, now with recurrent fever. Will treat with FQ for positive UA. F/U blood cultures. On bromocryptine for possible central fevers  - Hand lesions. Initially consulted vascular now vascular recommending plastics/hand--> no surgical interventions.   - Biopsy showing Non-invasive papillary urothelial carcinoma. Not a candidate for DMT per Onc.   - Tongue bitting s/p OMFS assistance in placing bite block. Dental consult.   - DVT ppx- SCD for now.   - Dispo- full code. Overall prognosis is poor given severe anoxia and untreatable cancer.

## 2025-04-24 NOTE — PROGRESS NOTE ADULT - ASSESSMENT
45 YO M with PMHx of sickle cell disease with prior acute chest syndrome requiring transfusion and exchange in the past (last 12/2024), iron overload, gout, HTN, and RUE DVT in 12/2024 on eliquis who presented on 3/15 by his hematologist second to anemia and hypoxia, down to 5.3 from baseline 7-8, with concern for vaso-occlusive crisis. While on medicine, anemia improved post transfusion and continued on SCC pain control PCA. Patient noted with left renal mass during prior admission and s/p L ureteroscopy with biopsy and stent placement on 3/19. Course complicated with hypoglycemia on 3/20 with RRT x 2 and found with severe anemia to 3.4 with hemorrhagic shock second to left perirenal and subcapsular hemorrhage and ultimately PEA arrest. ROSC achieved and transferred to MICU. s/p IR emobilization and course complicated anoxic brain injury, myoclonic jerks, GIB, MATA, HAGMA, DVTs (R IJ and LUE brachial), dysphagia, cirrhosis, and prolonged vent time s/p trach on 4/4. Transferred to [facility] on 4/18.     NEUROLOGY  # Anoxic brain injury   - s/p cardiac arrest on 3/20 with ROSC in 4 minutes   - CT HEAD post arrest with diffuse sulcal effacement with increased intracranial pressure, and few patchy foci of cortical blurring concern for HIE  - MRI brain post arrest with with diffuse global abnormal supratentorial cortical restricted diffusion consistent with global hypoxic injury   - Monitor for now    # Seizures vs myoclonic jerks   - Witness myoclonic jerking concerning for seizures with anoxic brain injury  - vEEG with abundant myoclonic seizures in the setting of a severe diffuse/multifocal cerebral dysfunction which can be seen in the setting of sedative effect or due to anoxia  - s/p valium 10 Q4H   - Continue on keppra   - Continue on klonopin 2mg TID   - Continue on dilaudid PRN     CARDIOLOGY  # Shock state likely hemorrhagic vs vasoplegia  - Acute renal bleed noted requiring multiple transfusions and pressors in ICU.   - TTE 3/22 with EF 63 with normal LVRVSF with PASP 49  - Weaned off pressors in ICU and complicated by hypertension.   - Started on norvasc 5, however dc'ed as BP improved on opioids   - Monitor BP   -resumed on norvasc 4/23    # Autonomic vs pain  - Mild HTN and tachypnea noted likely pain induced vs autonomia?   - Continue on dilaudid pushes PRN    PULMONARY  # Respiratory failure   - Presented with SCC and hypoxia likely from anemia with no signs of acute chest, however noted with PEA with hemorrhagic shock post renal bx requiring intubation.   - Prolonged intubation and s/p 7 PORTEX trach on 4/4   - Continue on vent 20/460/6/30   - BL LL atelectasis and continue on nebs with chest PT  - PIPs normal and holding IPV for now  - Continue on dilaudid PRN     GI  # Shock liver vs cirrhosis with sickle cell   - Transaminitis noted in ICU and thought to be second to shock liver likely second to hemorrhagic shock vs cardiac arrest, however LFTs remains elevated with acute on chronic hyperbilirubinemia.   - US ABD suggestive of early cirrhotic changes, cholecystectomy, and CBD 9mm   - CT AP with cirrhosis   - Trend LFTs and bili     # GIB  - Anemia with GIB noted in ICU   - s/p EGD 4/11 with cauterization of gastric ulcer & hemostatic spraying of three duodenal ulcers. Gastric ulcer likely due to NGT trauma.   - Continue on PPI BID   - Monitor stools and HH     # Oropharyngeal dysphagia   - GI unable to place PEG due to hepatomegaly.   - IR unable to place PEG second to no safe window   - s/p open G TUBE placement on 4/18   - Continue on TF    RENAL  # Acute renal failure and HAGMA second to shock state   - Scr on admission was 1.25 (3/15/25) and increased to 6s while in ICU and s/p HD 3/24-3/29 and then again on 4/4.   - L SHILE removed on 4/15   - CRE slowly improving but mild acidosis remains   - Continue on HCO3 1300 TID tabs for now   - Continue with daniel for now  - Monitor renal function, acidosis, and UOP   - trial to void (4/23) monitor urine output     # L renal mass bx c/b hematoma   - Renal bx as below on 3/19  - Hemorrhagic shock and PEA arrest on 3/20   - Renal US with large L renal subcapsular hematoma.    - IR left renal angiogram and embolization on 3/20   - CT 4/6 with unchanged large left subcapsular and perinephric hematoma.  - Monitor for now    # Hypernatremia  - s/p D5W   - Continue on  Q6H   - Monitor sodium     # Hyperphosphatemia   - Phos elevation likely second to renal failure   - PTH elevated, however Phos slowly down trending   - Monitor phos    INFECTIOUS DISEASE   # Fever   -(4/20_CXR with RLL infiltrate similar to prior, however slightly improved.   -(4/20)RVP negative   -(4/20) UA with pyuria   - PCT 0.5   - (4/20)  SCx and BCx -SC:x: commensal lay consistent with body site and few polymorphonecular leukocytes no squamous epithelial cells , few yeast like cells  .  -BCx NGT  - WBC coming down and will monitor off ABX.  - Return of fever with TMAX 100.7 F (4/22)   -(4/23) infectious workup :   * chest x ray : No focal consolidations. Cardiomegaly with small effusions unchanged.  * respiratory panel negative   *UA - cloudy, moderate leukocytes,  , RBC 10, moderate bacteria, presence of WBC clumps, mucus , and hyaline cast  -started on Levaquin 1g daily x 7 days  --Pending BCx      # VAP   - Post arrest noted with concern for aspiration and VAP in ICU   - Flu, SCx, BAL, BCx and UCx negative   - MRSA PCR with MSSA and completed bactroban in ICU   - Completed aztreonam (3/22-3/27) then casey (3/27-3/31) and vanco by dose in ICU   - CXR in ICU with RUL consolidation and completed recurrent casey course in ICU (4/2-4/7).     HEMATOLOGIC  # Anemia with SCC vs L kidney bleed vs GIB   - Baseline hemoglobin outpatient ~6.0-7.0 and sent in for anemia down to 5.3 without signs of bleeding and more likely SCC.    - Transfusion given on admission and improved back to baseline.   - Course complicated by hemorrhagic shock from left renal hematoma and HH down to 3.4.  - s/p 10U PRBC total while in ICU   - s/p PRBC pre-GTUBE with HH increased to 8.0.   - Trend HH  - CT/AP (4/20) Interval placement of gastrostomy tube without evidence of active GI   bleeding, Grossly stable left renal subcapsular hematoma without evidence of   active bleeding, Persistent small bilateral pleural effusions and airspace opacities.    - Monitor HH   -s/p 1 unit PRBC 4/22----hgb 5.9 -> 7.1    # SCD   - Discontinued deferasirox due to current renal failure   - Monitor Sickle labs QD (CBC with Diff, Retic, BMP, Hepatic Function, LDH and Hapto, and VBG).     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   - Heparin GTT held for G TUBE  - Hold resuming and monitor HH  - LUE precautions     # LUE arm infiltration   - HCO3 GTT infilitration in ICU   - Seen by hand sx and no compartment syndrome concern   - Monitor for now     # RUE blood infiltration   - RUE blood transfusion infiltration noted on 4/17 with area of ecchymosis   - RPT DOPPLER with known R IJ DVT, however no upper arm thrombus or issues in area of infiltration  - Monitor site for now    ENDOCRINE   # Glycemic control   - Monitor BG   - No hx of DM2     SKIN   - Wound care reccs appreciated    ETHICS   - FULL CODE     DISPO - TBD 43 YO M with PMHx of sickle cell disease with prior acute chest syndrome requiring transfusion and exchange in the past (last 12/2024), iron overload, gout, HTN, and RUE DVT in 12/2024 on eliquis who presented on 3/15 by his hematologist second to anemia and hypoxia, down to 5.3 from baseline 7-8, with concern for vaso-occlusive crisis. While on medicine, anemia improved post transfusion and continued on SCC pain control PCA. Patient noted with left renal mass during prior admission and s/p L ureteroscopy with biopsy and stent placement on 3/19. Course complicated with hypoglycemia on 3/20 with RRT x 2 and found with severe anemia to 3.4 with hemorrhagic shock second to left perirenal and subcapsular hemorrhage and ultimately PEA arrest. ROSC achieved and transferred to MICU. s/p IR emobilization and course complicated anoxic brain injury, myoclonic jerks, GIB, MATA, HAGMA, DVTs (R IJ and LUE brachial), dysphagia, cirrhosis, and prolonged vent time s/p trach on 4/4. Transferred to [facility] on 4/18.     NEUROLOGY  # Anoxic brain injury   - s/p cardiac arrest on 3/20 with ROSC in 4 minutes   - CT HEAD post arrest with diffuse sulcal effacement with increased intracranial pressure, and few patchy foci of cortical blurring concern for HIE  - MRI brain post arrest with with diffuse global abnormal supratentorial cortical restricted diffusion consistent with global hypoxic injury   - Monitor for now    # Seizures vs myoclonic jerks   - Witness myoclonic jerking concerning for seizures with anoxic brain injury  - vEEG with abundant myoclonic seizures in the setting of a severe diffuse/multifocal cerebral dysfunction which can be seen in the setting of sedative effect or due to anoxia  - s/p valium 10 Q4H   - Continue on keppra   - Continue on klonopin 2mg TID   - Continue on dilaudid PRN     CARDIOLOGY  # Shock state likely hemorrhagic vs vasoplegia  - Acute renal bleed noted requiring multiple transfusions and pressors in ICU.   - TTE 3/22 with EF 63 with normal LVRVSF with PASP 49  - Weaned off pressors in ICU and complicated by hypertension.   - Started on norvasc 5, however dc'ed as BP improved on opioids   - Monitor BP   -resumed on norvasc 4/23    # Autonomic vs pain  - Mild HTN and tachypnea noted likely pain induced vs autonomia?   - Continue on dilaudid pushes PRN    PULMONARY  # Respiratory failure   - Presented with SCC and hypoxia likely from anemia with no signs of acute chest, however noted with PEA with hemorrhagic shock post renal bx requiring intubation.   - Prolonged intubation and s/p 7 PORTEX trach on 4/4   - Continue on vent 20/460/6/30   - BL LL atelectasis and continue on nebs with chest PT  - PIPs normal and holding IPV for now  - Continue on dilaudid PRN     GI  # Shock liver vs cirrhosis with sickle cell   - Transaminitis noted in ICU and thought to be second to shock liver likely second to hemorrhagic shock vs cardiac arrest, however LFTs remains elevated with acute on chronic hyperbilirubinemia.   - US ABD suggestive of early cirrhotic changes, cholecystectomy, and CBD 9mm   - CT AP with cirrhosis   - Trend LFTs and bili     # GIB  - Anemia with GIB noted in ICU   - s/p EGD 4/11 with cauterization of gastric ulcer & hemostatic spraying of three duodenal ulcers. Gastric ulcer likely due to NGT trauma.   - Continue on PPI BID   - Monitor stools and HH     # Oropharyngeal dysphagia   - GI unable to place PEG due to hepatomegaly.   - IR unable to place PEG second to no safe window   - s/p open G TUBE placement on 4/18   - Continue on TF    RENAL  # Acute renal failure and HAGMA second to shock state   - Scr on admission was 1.25 (3/15/25) and increased to 6s while in ICU and s/p HD 3/24-3/29 and then again on 4/4.   - L SHILE removed on 4/15   - CRE slowly improving but mild acidosis remains   - Continue on HCO3 1300 TID tabs for now   - Continue with daniel for now  - Monitor renal function, acidosis, and UOP   - trial to void (4/23) monitor urine output     # L renal mass bx c/b hematoma   - Renal bx as below on 3/19  - Hemorrhagic shock and PEA arrest on 3/20   - Renal US with large L renal subcapsular hematoma.    - IR left renal angiogram and embolization on 3/20   - CT 4/6 with unchanged large left subcapsular and perinephric hematoma.  - Monitor for now    # Hypernatremia  - s/p D5W   - Continue on  Q6H   - Monitor sodium     # Hyperphosphatemia   - Phos elevation likely second to renal failure   - PTH elevated, however Phos slowly down trending   - Monitor phos    INFECTIOUS DISEASE   # Fever   -(4/20_CXR with RLL infiltrate similar to prior, however slightly improved.   -(4/20)RVP negative   -(4/20) UA with pyuria   - PCT 0.5   - (4/20)  SCx and BCx -SC:x: commensal lay consistent with body site and few polymorphonecular leukocytes no squamous epithelial cells , few yeast like cells  .  -BCx NGT  - WBC coming down and will monitor off ABX.  - Return of fever with TMAX 100.7 F (4/22)   -(4/23) infectious workup :   * chest x ray : No focal consolidations. Cardiomegaly with small effusions unchanged.  * respiratory panel negative   *UA - cloudy, moderate leukocytes,  , RBC 10, moderate bacteria, presence of WBC clumps, mucus , and hyaline cast  -started on Levaquin 1g daily x 5days (4/23 - )  -BCx 4/23 NEG      # VAP   - Post arrest noted with concern for aspiration and VAP in ICU   - Flu, SCx, BAL, BCx and UCx negative   - MRSA PCR with MSSA and completed bactroban in ICU   - Completed aztreonam (3/22-3/27) then casey (3/27-3/31) and vanco by dose in ICU   - CXR in ICU with RUL consolidation and completed recurrent casey course in ICU (4/2-4/7).     HEMATOLOGIC  # Anemia with SCC vs L kidney bleed vs GIB   - Baseline hemoglobin outpatient ~6.0-7.0 and sent in for anemia down to 5.3 without signs of bleeding and more likely SCC.    - Transfusion given on admission and improved back to baseline.   - Course complicated by hemorrhagic shock from left renal hematoma and HH down to 3.4.  - s/p 10U PRBC total while in ICU   - s/p PRBC pre-GTUBE with HH increased to 8.0.   - Trend HH  - CT/AP (4/20) Interval placement of gastrostomy tube without evidence of active GI   bleeding, Grossly stable left renal subcapsular hematoma without evidence of   active bleeding, Persistent small bilateral pleural effusions and airspace opacities.    - Monitor HH   -s/p 1 unit PRBC 4/22----hgb 5.9 -> 7.1    # SCD   - Discontinued deferasirox due to current renal failure   - Monitor Sickle labs QD (CBC with Diff, Retic, BMP, Hepatic Function, LDH and Hapto, and VBG).     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   - Heparin GTT held for G TUBE  - Given hemorrhagic shock history and dropping, DEFER FURTHER THERAPEUTIC ANTICOAGULATION   - Trial HSQ for DVT PPX (started 4/24)  - LUE precautions     # LUE arm infiltration   - HCO3 GTT infilitration in ICU   - Seen by hand sx and no compartment syndrome concern   - Monitor for now     # RUE blood infiltration   - RUE blood transfusion infiltration noted on 4/17 with area of ecchymosis   - RPT DOPPLER with known R IJ DVT, however no upper arm thrombus or issues in area of infiltration  - Monitor site for now    ENDOCRINE   # Glycemic control   - Monitor BG   - No hx of DM2     SKIN   - Wound care reccs appreciated    ETHICS   - FULL CODE     DISPO - TBD 43 YO M with PMHx of sickle cell disease with prior acute chest syndrome requiring transfusion and exchange in the past (last 12/2024), iron overload, gout, HTN, and RUE DVT in 12/2024 on eliquis who presented on 3/15 by his hematologist second to anemia and hypoxia, down to 5.3 from baseline 7-8, with concern for vaso-occlusive crisis. While on medicine, anemia improved post transfusion and continued on SCC pain control PCA. Patient noted with left renal mass during prior admission and s/p L ureteroscopy with biopsy and stent placement on 3/19. Course complicated with hypoglycemia on 3/20 with RRT x 2 and found with severe anemia to 3.4 with hemorrhagic shock second to left perirenal and subcapsular hemorrhage and ultimately PEA arrest. ROSC achieved and transferred to MICU. s/p IR emobilization and course complicated anoxic brain injury, myoclonic jerks, GIB, MATA, HAGMA, DVTs (R IJ and LUE brachial), dysphagia, cirrhosis, and prolonged vent time s/p trach on 4/4. Transferred to [facility] on 4/18.     NEUROLOGY  # Anoxic brain injury   - s/p cardiac arrest on 3/20 with ROSC in 4 minutes   - CT HEAD post arrest with diffuse sulcal effacement with increased intracranial pressure, and few patchy foci of cortical blurring concern for HIE  - MRI brain post arrest with with diffuse global abnormal supratentorial cortical restricted diffusion consistent with global hypoxic injury   - Monitor for now    # Seizures vs myoclonic jerks   - Witness myoclonic jerking concerning for seizures with anoxic brain injury  - vEEG with abundant myoclonic seizures in the setting of a severe diffuse/multifocal cerebral dysfunction which can be seen in the setting of sedative effect or due to anoxia  - s/p valium 10 Q4H   - Continue on keppra   - Continue on klonopin 2mg TID   - Continue on dilaudid PRN   - Started Bromocriptine for possibility of central etiology of ongoing fever (4/23)    HEENT  - Noted with tongue occlusive trauma s/p bite block replacement by OMFS (4/23)  - Pending Dental eval for mouthguard placement 5/1 10AM    CARDIOLOGY  # Shock state likely hemorrhagic vs vasoplegia  - Acute renal bleed noted requiring multiple transfusions and pressors in ICU.   - TTE 3/22 with EF 63 with normal LVRVSF with PASP 49  - Weaned off pressors in ICU and complicated by hypertension.   - Started on norvasc 5, however dc'ed as BP improved on opioids   - Monitor BP   -resumed on norvasc 4/23    # Autonomic vs pain  - Mild HTN and tachypnea noted likely pain induced vs autonomia?   - Continue on dilaudid pushes PRN    PULMONARY  # Respiratory failure   - Presented with SCC and hypoxia likely from anemia with no signs of acute chest, however noted with PEA with hemorrhagic shock post renal bx requiring intubation.   - Prolonged intubation and s/p 7 PORTEX trach on 4/4   - Continue on vent 20/460/6/30   - BL LL atelectasis and continue on nebs with chest PT  - PIPs normal and holding IPV for now  - Continue on dilaudid PRN     GI  # Shock liver vs cirrhosis with sickle cell   - Transaminitis noted in ICU and thought to be second to shock liver likely second to hemorrhagic shock vs cardiac arrest, however LFTs remains elevated with acute on chronic hyperbilirubinemia.   - US ABD suggestive of early cirrhotic changes, cholecystectomy, and CBD 9mm   - CT AP with cirrhosis   - Trend LFTs and bili     # GIB  - Anemia with GIB noted in ICU   - s/p EGD 4/11 with cauterization of gastric ulcer & hemostatic spraying of three duodenal ulcers. Gastric ulcer likely due to NGT trauma.   - Continue on PPI BID   - Monitor stools and HH     # Oropharyngeal dysphagia   - GI unable to place PEG due to hepatomegaly.   - IR unable to place PEG second to no safe window   - s/p open G TUBE placement on 4/18   - Continue on TF    RENAL  # Acute renal failure and HAGMA second to shock state   - Scr on admission was 1.25 (3/15/25) and increased to 6s while in ICU and s/p HD 3/24-3/29 and then again on 4/4.   - L SHILE removed on 4/15   - CRE slowly improving but mild acidosis remains   - Continue on HCO3 1300 TID tabs for now   - Continue with daniel for now  - Monitor renal function, acidosis, and UOP   - TOV ongoing (4/23 - )    # L renal mass bx c/b hematoma   - Renal bx as below on 3/19  - Hemorrhagic shock and PEA arrest on 3/20   - Renal US with large L renal subcapsular hematoma.    - IR left renal angiogram and embolization on 3/20   - CT 4/6 with unchanged large left subcapsular and perinephric hematoma.  - CT A/P (4/20) showing stable subcapsular renal hematoma and NO new active bleeds.  - Monitor for now    # Hypernatremia  - s/p D5W   - Continue on  Q6H   - Monitor sodium     # Hyperphosphatemia   - Phos elevation likely second to renal failure   - PTH elevated, however Phos slowly down trending   - Monitor phos    INFECTIOUS DISEASE   UTI  - New fever with POSITIVE UA started on LVQ (4/23 - ) x5D  -BCx 4/23 NEG    # Fever   -(4/20_CXR with RLL infiltrate similar to prior, however slightly improved.   -(4/20)RVP negative   -(4/20) UA with pyuria   - PCT 0.5   - (4/20)  SCx and BCx -SC:x: commensal lay consistent with body site and few polymorphonecular leukocytes no squamous epithelial cells , few yeast like cells  .  -BCx NGT    # VAP   - Post arrest noted with concern for aspiration and VAP in ICU   - Flu, SCx, BAL, BCx and UCx negative   - MRSA PCR with MSSA and completed bactroban in ICU   - Completed aztreonam (3/22-3/27) then casey (3/27-3/31) and vanco by dose in ICU   - CXR in ICU with RUL consolidation and completed recurrent casey course in ICU (4/2-4/7).     HEMATOLOGIC  # Anemia with SCC vs L kidney bleed vs GIB   - Baseline hemoglobin outpatient ~6.0-7.0 and sent in for anemia down to 5.3 without signs of bleeding and more likely SCC.    - Transfusion given on admission and improved back to baseline.   - Course complicated by hemorrhagic shock from left renal hematoma and HH down to 3.4.  - s/p 10U PRBC total while in ICU   - s/p PRBC pre-GTUBE with HH increased to 8.0.   - CT/AP (4/20) Interval placement of gastrostomy tube without evidence of active GI   bleeding, Grossly stable left renal subcapsular hematoma without evidence of   active bleeding, Persistent small bilateral pleural effusions and airspace opacities.    - Monitor HH   -s/p 1 unit PRBC 4/22----hgb 5.9 -> 7.1    # SCD   - Discontinued deferasirox due to current renal failure   - Monitor Sickle labs QD (CBC with Diff, Retic, BMP, Hepatic Function, LDH and Hapto, and VBG).     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   - Heparin GTT held for G TUBE  - Given hemorrhagic shock history and dropping, DEFER FURTHER THERAPEUTIC ANTICOAGULATION   - Trialing on HSQ for DVT PPX (started 4/24)  - LUE precautions     # LUE arm infiltration   - HCO3 GTT infilitration in ICU   - Seen by hand sx and no compartment syndrome concern   - Monitor for now     # RUE blood infiltration   - RUE blood transfusion infiltration noted on 4/17 with area of ecchymosis   - RPT DOPPLER with known R IJ DVT, however no upper arm thrombus or issues in area of infiltration  - Monitor site for now    ENDOCRINE   # Glycemic control   - Monitor BG   - No hx of DM2     SKIN   - Wound care reccs appreciated    ETHICS   - FULL CODE     DISPO - TBD None

## 2025-04-24 NOTE — PROGRESS NOTE ADULT - SUBJECTIVE AND OBJECTIVE BOX
CHIEF COMPLAINT:Patient is a 44y old  Male who presents with a chief complaint of Referred by Hematologist for low Hg (23 Apr 2025 17:21)      INTERVAL EVENTS:     ROS: Seen by bedside during AM rounds     OBJECTIVE:  ICU Vital Signs Last 24 Hrs  T(C): 37.1 (24 Apr 2025 06:00), Max: 37.2 (23 Apr 2025 08:00)  T(F): 98.8 (24 Apr 2025 06:00), Max: 98.9 (23 Apr 2025 08:00)  HR: 123 (24 Apr 2025 06:00) (95 - 123)  BP: 158/98 (24 Apr 2025 06:00) (156/93 - 180/104)  BP(mean): 116 (24 Apr 2025 06:00) (112 - 116)  ABP: --  ABP(mean): --  RR: 23 (24 Apr 2025 06:00) (20 - 27)  SpO2: 100% (24 Apr 2025 06:00) (98% - 100%)    O2 Parameters below as of 24 Apr 2025 06:00  Patient On (Oxygen Delivery Method): ventilator    O2 Concentration (%): 50      Mode: AC/ CMV (Assist Control/ Continuous Mandatory Ventilation), RR (machine): 20, TV (machine): 460, FiO2: 30, PEEP: 6, ITime: 0.85, MAP: 11, PIP: 26    04-23 @ 07:01  -  04-24 @ 07:00  --------------------------------------------------------  IN: 2469 mL / OUT: 2050 mL / NET: 419 mL      CAPILLARY BLOOD GLUCOSE      POCT Blood Glucose.: 140 mg/dL (24 Apr 2025 05:34)      PHYSICAL EXAM:  General:   HEENT:   Lymph Nodes:  Neck:   Respiratory:   Cardiovascular:   Abdomen:   Extremities:   Skin:   Neurological:  Psychiatry:    Mode: AC/ CMV (Assist Control/ Continuous Mandatory Ventilation)  RR (machine): 20  TV (machine): 460  FiO2: 30  PEEP: 6  ITime: 0.85  MAP: 11  PIP: 26      HOSPITAL MEDICATIONS:  MEDICATIONS  (STANDING):  albuterol/ipratropium for Nebulization 3 milliLiter(s) Nebulizer every 6 hours  amLODIPine   Tablet 5 milliGRAM(s) Oral daily  bromocriptine Tablet 2.5 milliGRAM(s) Oral every 8 hours  chlorhexidine 0.12% Liquid 15 milliLiter(s) Oral Mucosa two times a day  chlorhexidine 2% Cloths 1 Application(s) Topical daily  clonazePAM  Tablet 2 milliGRAM(s) Oral every 8 hours  influenza   Vaccine 0.5 milliLiter(s) IntraMuscular once  levETIRAcetam  Solution 500 milliGRAM(s) Oral two times a day  levoFLOXacin IVPB 750 milliGRAM(s) IV Intermittent every 24 hours  pantoprazole   Suspension 40 milliGRAM(s) Oral every 12 hours  sodium bicarbonate 1300 milliGRAM(s) Oral three times a day    MEDICATIONS  (PRN):  HYDROmorphone  Injectable 0.5 milliGRAM(s) IV Push every 4 hours PRN dysynchronny      LABS:                        7.8    21.29 )-----------( 385      ( 24 Apr 2025 05:30 )             23.7     04-24    144  |  110[H]  |  53[H]  ----------------------------<  138[H]  3.9   |  21[L]  |  1.59[H]    Ca    9.6      24 Apr 2025 05:30  Phos  3.1     04-24  Mg     2.00     04-24    TPro  7.8  /  Alb  2.8[L]  /  TBili  3.7[H]  /  DBili  2.8[H]  /  AST  141[H]  /  ALT  80[H]  /  AlkPhos  476[H]  04-24      Urinalysis Basic - ( 24 Apr 2025 05:30 )    Color: x / Appearance: x / SG: x / pH: x  Gluc: 138 mg/dL / Ketone: x  / Bili: x / Urobili: x   Blood: x / Protein: x / Nitrite: x   Leuk Esterase: x / RBC: x / WBC x   Sq Epi: x / Non Sq Epi: x / Bacteria: x        Venous Blood Gas:  04-24 @ 05:30  7.39/39/63/24/93.0  VBG Lactate: 1.5  Venous Blood Gas:  04-23 @ 06:26  7.43/34/77/23/96.9  VBG Lactate: 1.5   CHIEF COMPLAINT:Patient is a 44y old  Male who presents with a chief complaint of Referred by Hematologist for low Hg (23 Apr 2025 17:21)      INTERVAL EVENTS:     ROS: Seen by bedside during AM rounds     OBJECTIVE:  ICU Vital Signs Last 24 Hrs  T(C): 37.1 (24 Apr 2025 06:00), Max: 37.2 (23 Apr 2025 08:00)  T(F): 98.8 (24 Apr 2025 06:00), Max: 98.9 (23 Apr 2025 08:00)  HR: 123 (24 Apr 2025 06:00) (95 - 123)  BP: 158/98 (24 Apr 2025 06:00) (156/93 - 180/104)  BP(mean): 116 (24 Apr 2025 06:00) (112 - 116)  ABP: --  ABP(mean): --  RR: 23 (24 Apr 2025 06:00) (20 - 27)  SpO2: 100% (24 Apr 2025 06:00) (98% - 100%)    O2 Parameters below as of 24 Apr 2025 06:00  Patient On (Oxygen Delivery Method): ventilator    O2 Concentration (%): 50      Mode: AC/ CMV (Assist Control/ Continuous Mandatory Ventilation), RR (machine): 20, TV (machine): 460, FiO2: 30, PEEP: 6, ITime: 0.85, MAP: 11, PIP: 26    04-23 @ 07:01  -  04-24 @ 07:00  --------------------------------------------------------  IN: 2469 mL / OUT: 2050 mL / NET: 419 mL      CAPILLARY BLOOD GLUCOSE      POCT Blood Glucose.: 140 mg/dL (24 Apr 2025 05:34)      PHYSICAL EXAM:  General: NAD   Neck: (+) Trach tube noted, site c/d/i.  Cards: S1/S2, no murmurs   Pulm: CTA bilaterally. No wheezes.   Abdomen: Soft, NTND. Midline laparotomy incision with gauze dressing overlying. (+) G tube noted, site c/d/i.   Extremities: Anasarca with b/l UE and LE edema. Extremities warm to touch.  Neurology: Anoxic. Eyes open. Nonverbal. Not following commands or tracking. Not withdrawing to pain.   Skin: warm to touch, color appropriate for ethnicity. Refer to RN assessment for further details.      Mode: AC/ CMV (Assist Control/ Continuous Mandatory Ventilation)  RR (machine): 20  TV (machine): 460  FiO2: 30  PEEP: 6  ITime: 0.85  MAP: 11  PIP: 26      HOSPITAL MEDICATIONS:  MEDICATIONS  (STANDING):  albuterol/ipratropium for Nebulization 3 milliLiter(s) Nebulizer every 6 hours  amLODIPine   Tablet 5 milliGRAM(s) Oral daily  bromocriptine Tablet 2.5 milliGRAM(s) Oral every 8 hours  chlorhexidine 0.12% Liquid 15 milliLiter(s) Oral Mucosa two times a day  chlorhexidine 2% Cloths 1 Application(s) Topical daily  clonazePAM  Tablet 2 milliGRAM(s) Oral every 8 hours  influenza   Vaccine 0.5 milliLiter(s) IntraMuscular once  levETIRAcetam  Solution 500 milliGRAM(s) Oral two times a day  levoFLOXacin IVPB 750 milliGRAM(s) IV Intermittent every 24 hours  pantoprazole   Suspension 40 milliGRAM(s) Oral every 12 hours  sodium bicarbonate 1300 milliGRAM(s) Oral three times a day    MEDICATIONS  (PRN):  HYDROmorphone  Injectable 0.5 milliGRAM(s) IV Push every 4 hours PRN dysynchronny      LABS:                        7.8    21.29 )-----------( 385      ( 24 Apr 2025 05:30 )             23.7     04-24    144  |  110[H]  |  53[H]  ----------------------------<  138[H]  3.9   |  21[L]  |  1.59[H]    Ca    9.6      24 Apr 2025 05:30  Phos  3.1     04-24  Mg     2.00     04-24    TPro  7.8  /  Alb  2.8[L]  /  TBili  3.7[H]  /  DBili  2.8[H]  /  AST  141[H]  /  ALT  80[H]  /  AlkPhos  476[H]  04-24      Urinalysis Basic - ( 24 Apr 2025 05:30 )    Color: x / Appearance: x / SG: x / pH: x  Gluc: 138 mg/dL / Ketone: x  / Bili: x / Urobili: x   Blood: x / Protein: x / Nitrite: x   Leuk Esterase: x / RBC: x / WBC x   Sq Epi: x / Non Sq Epi: x / Bacteria: x        Venous Blood Gas:  04-24 @ 05:30  7.39/39/63/24/93.0  VBG Lactate: 1.5  Venous Blood Gas:  04-23 @ 06:26  7.43/34/77/23/96.9  VBG Lactate: 1.5   CHIEF COMPLAINT:Patient is a 44y old  Male who presents with a chief complaint of Referred by Hematologist for low Hg (23 Apr 2025 17:21)      INTERVAL EVENTS: no overnight events. Last fever 100.3F (4/23 AM). Bite block replaced by OMFS yesterday.     ROS: Unable to obtain ROS given patient is nonverbal in s/o anoxia.    OBJECTIVE:  ICU Vital Signs Last 24 Hrs  T(C): 37.1 (24 Apr 2025 06:00), Max: 37.2 (23 Apr 2025 08:00)  T(F): 98.8 (24 Apr 2025 06:00), Max: 98.9 (23 Apr 2025 08:00)  HR: 123 (24 Apr 2025 06:00) (95 - 123)  BP: 158/98 (24 Apr 2025 06:00) (156/93 - 180/104)  BP(mean): 116 (24 Apr 2025 06:00) (112 - 116)  ABP: --  ABP(mean): --  RR: 23 (24 Apr 2025 06:00) (20 - 27)  SpO2: 100% (24 Apr 2025 06:00) (98% - 100%)    O2 Parameters below as of 24 Apr 2025 06:00  Patient On (Oxygen Delivery Method): ventilator    O2 Concentration (%): 50      Mode: AC/ CMV (Assist Control/ Continuous Mandatory Ventilation), RR (machine): 20, TV (machine): 460, FiO2: 30, PEEP: 6, ITime: 0.85, MAP: 11, PIP: 26    04-23 @ 07:01  -  04-24 @ 07:00  --------------------------------------------------------  IN: 2469 mL / OUT: 2050 mL / NET: 419 mL      CAPILLARY BLOOD GLUCOSE      POCT Blood Glucose.: 140 mg/dL (24 Apr 2025 05:34)      PHYSICAL EXAM:  General: NAD   Neck: (+) Trach tube noted, site c/d/i.  Cards: S1/S2, no murmurs   Pulm: CTA bilaterally. No wheezes.   Abdomen: Soft, NTND. Midline laparotomy incision with gauze dressing overlying. (+) G tube noted, site c/d/i.   Extremities: Anasarca with b/l UE and LE edema. Extremities warm to touch.  Neurology: Anoxic. Eyes open. Nonverbal. Not following commands or tracking. Not withdrawing to pain.   Skin: warm to touch, color appropriate for ethnicity. Refer to RN assessment for further details.      Mode: AC/ CMV (Assist Control/ Continuous Mandatory Ventilation)  RR (machine): 20  TV (machine): 460  FiO2: 30  PEEP: 6  ITime: 0.85  MAP: 11  PIP: 26      HOSPITAL MEDICATIONS:  MEDICATIONS  (STANDING):  albuterol/ipratropium for Nebulization 3 milliLiter(s) Nebulizer every 6 hours  amLODIPine   Tablet 5 milliGRAM(s) Oral daily  bromocriptine Tablet 2.5 milliGRAM(s) Oral every 8 hours  chlorhexidine 0.12% Liquid 15 milliLiter(s) Oral Mucosa two times a day  chlorhexidine 2% Cloths 1 Application(s) Topical daily  clonazePAM  Tablet 2 milliGRAM(s) Oral every 8 hours  influenza   Vaccine 0.5 milliLiter(s) IntraMuscular once  levETIRAcetam  Solution 500 milliGRAM(s) Oral two times a day  levoFLOXacin IVPB 750 milliGRAM(s) IV Intermittent every 24 hours  pantoprazole   Suspension 40 milliGRAM(s) Oral every 12 hours  sodium bicarbonate 1300 milliGRAM(s) Oral three times a day    MEDICATIONS  (PRN):  HYDROmorphone  Injectable 0.5 milliGRAM(s) IV Push every 4 hours PRN dysynchronny      LABS:                        7.8    21.29 )-----------( 385      ( 24 Apr 2025 05:30 )             23.7     04-24    144  |  110[H]  |  53[H]  ----------------------------<  138[H]  3.9   |  21[L]  |  1.59[H]    Ca    9.6      24 Apr 2025 05:30  Phos  3.1     04-24  Mg     2.00     04-24    TPro  7.8  /  Alb  2.8[L]  /  TBili  3.7[H]  /  DBili  2.8[H]  /  AST  141[H]  /  ALT  80[H]  /  AlkPhos  476[H]  04-24      Urinalysis Basic - ( 24 Apr 2025 05:30 )    Color: x / Appearance: x / SG: x / pH: x  Gluc: 138 mg/dL / Ketone: x  / Bili: x / Urobili: x   Blood: x / Protein: x / Nitrite: x   Leuk Esterase: x / RBC: x / WBC x   Sq Epi: x / Non Sq Epi: x / Bacteria: x        Venous Blood Gas:  04-24 @ 05:30  7.39/39/63/24/93.0  VBG Lactate: 1.5  Venous Blood Gas:  04-23 @ 06:26  7.43/34/77/23/96.9  VBG Lactate: 1.5

## 2025-04-25 LAB
ALBUMIN SERPL ELPH-MCNC: 2.8 G/DL — LOW (ref 3.3–5)
ALP SERPL-CCNC: 515 U/L — HIGH (ref 40–120)
ALT FLD-CCNC: 90 U/L — HIGH (ref 4–41)
ANION GAP SERPL CALC-SCNC: 10 MMOL/L — SIGNIFICANT CHANGE UP (ref 7–14)
AST SERPL-CCNC: 167 U/L — HIGH (ref 4–40)
BASOPHILS # BLD AUTO: 0.12 K/UL — SIGNIFICANT CHANGE UP (ref 0–0.2)
BASOPHILS NFR BLD AUTO: 0.6 % — SIGNIFICANT CHANGE UP (ref 0–2)
BILIRUB SERPL-MCNC: 3.4 MG/DL — HIGH (ref 0.2–1.2)
BLD GP AB SCN SERPL QL: NEGATIVE — SIGNIFICANT CHANGE UP
BUN SERPL-MCNC: 54 MG/DL — HIGH (ref 7–23)
CALCIUM SERPL-MCNC: 9.6 MG/DL — SIGNIFICANT CHANGE UP (ref 8.4–10.5)
CHLORIDE SERPL-SCNC: 113 MMOL/L — HIGH (ref 98–107)
CO2 SERPL-SCNC: 22 MMOL/L — SIGNIFICANT CHANGE UP (ref 22–31)
CREAT SERPL-MCNC: 1.54 MG/DL — HIGH (ref 0.5–1.3)
CULTURE RESULTS: ABNORMAL
CULTURE RESULTS: SIGNIFICANT CHANGE UP
EGFR: 56 ML/MIN/1.73M2 — LOW
EGFR: 56 ML/MIN/1.73M2 — LOW
EOSINOPHIL # BLD AUTO: 0.45 K/UL — SIGNIFICANT CHANGE UP (ref 0–0.5)
EOSINOPHIL NFR BLD AUTO: 2.3 % — SIGNIFICANT CHANGE UP (ref 0–6)
GLUCOSE BLDC GLUCOMTR-MCNC: 120 MG/DL — HIGH (ref 70–99)
GLUCOSE BLDC GLUCOMTR-MCNC: 136 MG/DL — HIGH (ref 70–99)
GLUCOSE SERPL-MCNC: 139 MG/DL — HIGH (ref 70–99)
HAPTOGLOB SERPL-MCNC: 58 MG/DL — SIGNIFICANT CHANGE UP (ref 34–200)
HCT VFR BLD CALC: 21.6 % — LOW (ref 39–50)
HCT VFR BLD CALC: 21.7 % — LOW (ref 39–50)
HGB BLD-MCNC: 7 G/DL — CRITICAL LOW (ref 13–17)
HGB BLD-MCNC: 7.1 G/DL — LOW (ref 13–17)
IANC: 16.73 K/UL — HIGH (ref 1.8–7.4)
IMM GRANULOCYTES NFR BLD AUTO: 0.8 % — SIGNIFICANT CHANGE UP (ref 0–0.9)
LDH SERPL L TO P-CCNC: 274 U/L — HIGH (ref 135–225)
LYMPHOCYTES # BLD AUTO: 1.44 K/UL — SIGNIFICANT CHANGE UP (ref 1–3.3)
LYMPHOCYTES # BLD AUTO: 7.2 % — LOW (ref 13–44)
MAGNESIUM SERPL-MCNC: 2 MG/DL — SIGNIFICANT CHANGE UP (ref 1.6–2.6)
MCHC RBC-ENTMCNC: 29.8 PG — SIGNIFICANT CHANGE UP (ref 27–34)
MCHC RBC-ENTMCNC: 30.3 PG — SIGNIFICANT CHANGE UP (ref 27–34)
MCHC RBC-ENTMCNC: 32.4 G/DL — SIGNIFICANT CHANGE UP (ref 32–36)
MCHC RBC-ENTMCNC: 32.7 G/DL — SIGNIFICANT CHANGE UP (ref 32–36)
MCV RBC AUTO: 91.2 FL — SIGNIFICANT CHANGE UP (ref 80–100)
MCV RBC AUTO: 93.5 FL — SIGNIFICANT CHANGE UP (ref 80–100)
MONOCYTES # BLD AUTO: 1.08 K/UL — HIGH (ref 0–0.9)
MONOCYTES NFR BLD AUTO: 5.4 % — SIGNIFICANT CHANGE UP (ref 2–14)
NEUTROPHILS # BLD AUTO: 16.73 K/UL — HIGH (ref 1.8–7.4)
NEUTROPHILS NFR BLD AUTO: 83.7 % — HIGH (ref 43–77)
NRBC # BLD AUTO: 0 K/UL — SIGNIFICANT CHANGE UP (ref 0–0)
NRBC # BLD AUTO: 0.03 K/UL — HIGH (ref 0–0)
NRBC # FLD: 0 K/UL — SIGNIFICANT CHANGE UP (ref 0–0)
NRBC # FLD: 0.03 K/UL — HIGH (ref 0–0)
NRBC BLD AUTO-RTO: 0 /100 WBCS — SIGNIFICANT CHANGE UP (ref 0–0)
PHOSPHATE SERPL-MCNC: 3.3 MG/DL — SIGNIFICANT CHANGE UP (ref 2.5–4.5)
PLATELET # BLD AUTO: 335 K/UL — SIGNIFICANT CHANGE UP (ref 150–400)
PLATELET # BLD AUTO: 340 K/UL — SIGNIFICANT CHANGE UP (ref 150–400)
POTASSIUM SERPL-MCNC: 3.8 MMOL/L — SIGNIFICANT CHANGE UP (ref 3.5–5.3)
POTASSIUM SERPL-SCNC: 3.8 MMOL/L — SIGNIFICANT CHANGE UP (ref 3.5–5.3)
PROT SERPL-MCNC: 7.8 G/DL — SIGNIFICANT CHANGE UP (ref 6–8.3)
RBC # BLD: 2.31 M/UL — LOW (ref 4.2–5.8)
RBC # BLD: 2.38 M/UL — LOW (ref 4.2–5.8)
RBC # BLD: 2.38 M/UL — LOW (ref 4.2–5.8)
RBC # FLD: 17.4 % — HIGH (ref 10.3–14.5)
RBC # FLD: 17.8 % — HIGH (ref 10.3–14.5)
RETICS #: 123.8 K/UL — SIGNIFICANT CHANGE UP (ref 25–125)
RETICS/RBC NFR: 5.2 % — HIGH (ref 0.5–2.5)
RH IG SCN BLD-IMP: POSITIVE — SIGNIFICANT CHANGE UP
SODIUM SERPL-SCNC: 145 MMOL/L — SIGNIFICANT CHANGE UP (ref 135–145)
SPECIMEN SOURCE: SIGNIFICANT CHANGE UP
SPECIMEN SOURCE: SIGNIFICANT CHANGE UP
WBC # BLD: 19.97 K/UL — HIGH (ref 3.8–10.5)
WBC # BLD: 20.51 K/UL — HIGH (ref 3.8–10.5)
WBC # FLD AUTO: 19.97 K/UL — HIGH (ref 3.8–10.5)
WBC # FLD AUTO: 20.51 K/UL — HIGH (ref 3.8–10.5)

## 2025-04-25 PROCEDURE — 99233 SBSQ HOSP IP/OBS HIGH 50: CPT

## 2025-04-25 RX ADMIN — CLONAZEPAM 2 MILLIGRAM(S): 0.5 TABLET ORAL at 13:12

## 2025-04-25 RX ADMIN — Medication 1300 MILLIGRAM(S): at 05:04

## 2025-04-25 RX ADMIN — Medication 15 MILLILITER(S): at 17:17

## 2025-04-25 RX ADMIN — Medication 2.5 MILLIGRAM(S): at 13:13

## 2025-04-25 RX ADMIN — Medication 2.5 MILLIGRAM(S): at 23:21

## 2025-04-25 RX ADMIN — IPRATROPIUM BROMIDE AND ALBUTEROL SULFATE 3 MILLILITER(S): .5; 2.5 SOLUTION RESPIRATORY (INHALATION) at 15:39

## 2025-04-25 RX ADMIN — LEVETIRACETAM 500 MILLIGRAM(S): 10 INJECTION, SOLUTION INTRAVENOUS at 05:04

## 2025-04-25 RX ADMIN — AMLODIPINE BESYLATE 5 MILLIGRAM(S): 10 TABLET ORAL at 05:04

## 2025-04-25 RX ADMIN — Medication 2.5 MILLIGRAM(S): at 05:04

## 2025-04-25 RX ADMIN — Medication 40 MILLIGRAM(S): at 18:53

## 2025-04-25 RX ADMIN — Medication 40 MILLIEQUIVALENT(S): at 11:20

## 2025-04-25 RX ADMIN — LEVETIRACETAM 500 MILLIGRAM(S): 10 INJECTION, SOLUTION INTRAVENOUS at 17:18

## 2025-04-25 RX ADMIN — Medication 1300 MILLIGRAM(S): at 23:21

## 2025-04-25 RX ADMIN — HEPARIN SODIUM 5000 UNIT(S): 1000 INJECTION INTRAVENOUS; SUBCUTANEOUS at 05:05

## 2025-04-25 RX ADMIN — IPRATROPIUM BROMIDE AND ALBUTEROL SULFATE 3 MILLILITER(S): .5; 2.5 SOLUTION RESPIRATORY (INHALATION) at 09:59

## 2025-04-25 RX ADMIN — Medication 1 APPLICATION(S): at 11:13

## 2025-04-25 RX ADMIN — HEPARIN SODIUM 5000 UNIT(S): 1000 INJECTION INTRAVENOUS; SUBCUTANEOUS at 17:18

## 2025-04-25 RX ADMIN — Medication 40 MILLIGRAM(S): at 05:04

## 2025-04-25 RX ADMIN — IPRATROPIUM BROMIDE AND ALBUTEROL SULFATE 3 MILLILITER(S): .5; 2.5 SOLUTION RESPIRATORY (INHALATION) at 21:51

## 2025-04-25 RX ADMIN — CLONAZEPAM 2 MILLIGRAM(S): 0.5 TABLET ORAL at 23:20

## 2025-04-25 RX ADMIN — IPRATROPIUM BROMIDE AND ALBUTEROL SULFATE 3 MILLILITER(S): .5; 2.5 SOLUTION RESPIRATORY (INHALATION) at 02:46

## 2025-04-25 RX ADMIN — Medication 1300 MILLIGRAM(S): at 13:13

## 2025-04-25 RX ADMIN — CLONAZEPAM 2 MILLIGRAM(S): 0.5 TABLET ORAL at 05:04

## 2025-04-25 RX ADMIN — Medication 15 MILLILITER(S): at 05:05

## 2025-04-25 NOTE — PROGRESS NOTE ADULT - NS ATTEND AMEND GEN_ALL_CORE FT
44 year old male with a history of SSD, UE DVT, gout, HTN, with L renal mass admitted with acute anemia/ sickle cell crisis and evaluation of his left renal mass s/p left ureteroscopy and biopsy (3/19/25) with 24 hour post operative course complicated by rapid responses for hypoglycemia c/b cardiac arrest x 4 minutes CPR/1 epi with ROSC, transferred to MICU for further care     Post rosc found to be profoundly anemic, acidemic, with multiorgan dysfunction overall concerning for acute hemorrhagic shock.   Now with anoxia on CTH as well as MRI. Also with myclonus.  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     Patient after long GOC with family is now s/p trach and peg. H/H low but stable in setting of SSD. CT without source of bleeding. No longer requiring HD.     Having on and off fevers,. Possibly central fevers. Fevers improved with Levaquin and bromocriptine.   Tongue bitting s/p bite block. Awaiting mouth guard next week.     # Cardiac arrest  # Anoxic encephalopathy  # Myoclonus  # Anemia/SSD  # Transaminitis  # MATA 2/2 to ATN  # DVT  # Hyperbilirubinemia, Early cirrhosis?  # Urothelial Cancer.   # Fever  # Tongue bitting  - Cardiac arrest, short down time but with likely anoxic injury and multiorgan dysfunction in setting of hemorrhage, time of arrest Hg was 3 and hypoglycemic  - Repeat CT and MRI showing diffuse anoxia. Clinically doing poorly. Simulation myoclonus. C/W PO benzos as tolerated. PRN opoids.   - C/W vent, patient unlikely to be wean from the ventilator.   - Transfuse PRN, s/p embolization by IR. Hematoma stable on CT scan. Not tolerating full a/c.   - DVT in the upper ext likely related with catheter.  - SSD, monitor hemolysis labs. SS labs  - LFT overall downtrending. continue to mointor.   - MATA. s/p HD. Monitor urine output. Trend Cr.   - S/P courses of abx, now with recurrent fever. Will treat with FQ for positive UA. F/U blood cultures. On bromocryptine for possible central fevers  - Hand lesions. Initially consulted vascular now vascular recommending plastics/hand--> no surgical interventions.   - Biopsy showing Non-invasive papillary urothelial carcinoma. Not a candidate for DMT per Onc.   - Tongue bitting s/p OMFS assistance in placing bite block. Dental consult--> Mouth Guard next week.   - DVT ppx- SCD for now.   - Dispo- full code. Overall prognosis is poor given severe anoxia and untreatable cancer.

## 2025-04-25 NOTE — PROGRESS NOTE ADULT - SUBJECTIVE AND OBJECTIVE BOX
CHIEF COMPLAINT:Patient is a 44y old  Male who presents with a chief complaint of Referred by Hematologist for low Hg (24 Apr 2025 07:17)      INTERVAL EVENTS:     ROS: Seen by bedside during AM rounds     OBJECTIVE:  ICU Vital Signs Last 24 Hrs  T(C): 36.9 (25 Apr 2025 04:00), Max: 37.2 (24 Apr 2025 20:00)  T(F): 98.4 (25 Apr 2025 04:00), Max: 98.9 (24 Apr 2025 20:00)  HR: 118 (25 Apr 2025 04:00) (104 - 124)  BP: 151/96 (25 Apr 2025 04:00) (143/88 - 156/95)  BP(mean): 102 (25 Apr 2025 00:00) (102 - 102)  ABP: --  ABP(mean): --  RR: 20 (25 Apr 2025 04:00) (20 - 20)  SpO2: 100% (25 Apr 2025 04:00) (100% - 100%)    O2 Parameters below as of 25 Apr 2025 04:00      O2 Concentration (%): 50      Mode: AC/ CMV (Assist Control/ Continuous Mandatory Ventilation), RR (machine): 20, TV (machine): 460, FiO2: 30, PEEP: 6, ITime: 0.86, MAP: 11, PIP: 23    04-23 @ 07:01 - 04-24 @ 07:00  --------------------------------------------------------  IN: 2469 mL / OUT: 2050 mL / NET: 419 mL    04-24 @ 07:01  -  04-25 @ 06:55  --------------------------------------------------------  IN: 0 mL / OUT: 800 mL / NET: -800 mL      CAPILLARY BLOOD GLUCOSE      POCT Blood Glucose.: 128 mg/dL (24 Apr 2025 22:00)      PHYSICAL EXAM:  General:   HEENT:   Lymph Nodes:  Neck:   Respiratory:   Cardiovascular:   Abdomen:   Extremities:   Skin:   Neurological:  Psychiatry:    Mode: AC/ CMV (Assist Control/ Continuous Mandatory Ventilation)  RR (machine): 20  TV (machine): 460  FiO2: 30  PEEP: 6  ITime: 0.86  MAP: 11  PIP: 23      HOSPITAL MEDICATIONS:  MEDICATIONS  (STANDING):  albuterol/ipratropium for Nebulization 3 milliLiter(s) Nebulizer every 6 hours  amLODIPine   Tablet 5 milliGRAM(s) Oral daily  bromocriptine Tablet 2.5 milliGRAM(s) Oral every 8 hours  chlorhexidine 0.12% Liquid 15 milliLiter(s) Oral Mucosa two times a day  chlorhexidine 2% Cloths 1 Application(s) Topical daily  clonazePAM  Tablet 2 milliGRAM(s) Oral every 8 hours  heparin   Injectable 5000 Unit(s) SubCutaneous every 12 hours  influenza   Vaccine 0.5 milliLiter(s) IntraMuscular once  levETIRAcetam  Solution 500 milliGRAM(s) Oral two times a day  levoFLOXacin IVPB 750 milliGRAM(s) IV Intermittent every 24 hours  pantoprazole   Suspension 40 milliGRAM(s) Oral every 12 hours  sodium bicarbonate 1300 milliGRAM(s) Oral three times a day    MEDICATIONS  (PRN):  HYDROmorphone  Injectable 0.5 milliGRAM(s) IV Push every 4 hours PRN dysynchronny      LABS:                        7.1    19.97 )-----------( 340      ( 25 Apr 2025 05:55 )             21.7     04-24    144  |  110[H]  |  53[H]  ----------------------------<  138[H]  3.9   |  21[L]  |  1.59[H]    Ca    9.6      24 Apr 2025 05:30  Phos  3.1     04-24  Mg     2.00     04-24    TPro  7.8  /  Alb  2.8[L]  /  TBili  3.7[H]  /  DBili  2.8[H]  /  AST  141[H]  /  ALT  80[H]  /  AlkPhos  476[H]  04-24      Urinalysis Basic - ( 24 Apr 2025 05:30 )    Color: x / Appearance: x / SG: x / pH: x  Gluc: 138 mg/dL / Ketone: x  / Bili: x / Urobili: x   Blood: x / Protein: x / Nitrite: x   Leuk Esterase: x / RBC: x / WBC x   Sq Epi: x / Non Sq Epi: x / Bacteria: x        Venous Blood Gas:  04-24 @ 05:30  7.39/39/63/24/93.0  VBG Lactate: 1.5   CHIEF COMPLAINT:Patient is a 44y old  Male who presents with a chief complaint of Referred by Hematologist for low Hg (24 Apr 2025 07:17)      INTERVAL EVENTS:     ROS: Seen by bedside during AM rounds     OBJECTIVE:  ICU Vital Signs Last 24 Hrs  T(C): 36.9 (25 Apr 2025 04:00), Max: 37.2 (24 Apr 2025 20:00)  T(F): 98.4 (25 Apr 2025 04:00), Max: 98.9 (24 Apr 2025 20:00)  HR: 118 (25 Apr 2025 04:00) (104 - 124)  BP: 151/96 (25 Apr 2025 04:00) (143/88 - 156/95)  BP(mean): 102 (25 Apr 2025 00:00) (102 - 102)  ABP: --  ABP(mean): --  RR: 20 (25 Apr 2025 04:00) (20 - 20)  SpO2: 100% (25 Apr 2025 04:00) (100% - 100%)    O2 Parameters below as of 25 Apr 2025 04:00      O2 Concentration (%): 50      Mode: AC/ CMV (Assist Control/ Continuous Mandatory Ventilation), RR (machine): 20, TV (machine): 460, FiO2: 30, PEEP: 6, ITime: 0.86, MAP: 11, PIP: 23    04-23 @ 07:01  -  04-24 @ 07:00  --------------------------------------------------------  IN: 2469 mL / OUT: 2050 mL / NET: 419 mL    04-24 @ 07:01  -  04-25 @ 06:55  --------------------------------------------------------  IN: 0 mL / OUT: 800 mL / NET: -800 mL      CAPILLARY BLOOD GLUCOSE      POCT Blood Glucose.: 128 mg/dL (24 Apr 2025 22:00)      PHYSICAL EXAM:  General: NAD   HEENT:(+) bite block in oral cavity.  Neck: (+) Trach tube noted, site c/d/i.  Cards: S1/S2, no murmurs   Pulm: CTA bilaterally. No wheezes.   Abdomen: Soft, NTND. (+) PEG noted, site c/d/i. Midline laparotomy incision with overlying dressing.   Extremities: Anasarca with b/l UE and LE edema. Extremities warm to touch.  Neurology: Anoxic. Nonverbal. Not following commands or tracking. Not withdrawing to pain.   Skin: warm to touch, color appropriate for ethnicity. Refer to RN assessment for further details.      Mode: AC/ CMV (Assist Control/ Continuous Mandatory Ventilation)  RR (machine): 20  TV (machine): 460  FiO2: 30  PEEP: 6  ITime: 0.86  MAP: 11  PIP: 23      HOSPITAL MEDICATIONS:  MEDICATIONS  (STANDING):  albuterol/ipratropium for Nebulization 3 milliLiter(s) Nebulizer every 6 hours  amLODIPine   Tablet 5 milliGRAM(s) Oral daily  bromocriptine Tablet 2.5 milliGRAM(s) Oral every 8 hours  chlorhexidine 0.12% Liquid 15 milliLiter(s) Oral Mucosa two times a day  chlorhexidine 2% Cloths 1 Application(s) Topical daily  clonazePAM  Tablet 2 milliGRAM(s) Oral every 8 hours  heparin   Injectable 5000 Unit(s) SubCutaneous every 12 hours  influenza   Vaccine 0.5 milliLiter(s) IntraMuscular once  levETIRAcetam  Solution 500 milliGRAM(s) Oral two times a day  levoFLOXacin IVPB 750 milliGRAM(s) IV Intermittent every 24 hours  pantoprazole   Suspension 40 milliGRAM(s) Oral every 12 hours  sodium bicarbonate 1300 milliGRAM(s) Oral three times a day    MEDICATIONS  (PRN):  HYDROmorphone  Injectable 0.5 milliGRAM(s) IV Push every 4 hours PRN dysynchronny      LABS:                        7.1    19.97 )-----------( 340      ( 25 Apr 2025 05:55 )             21.7     04-24    144  |  110[H]  |  53[H]  ----------------------------<  138[H]  3.9   |  21[L]  |  1.59[H]    Ca    9.6      24 Apr 2025 05:30  Phos  3.1     04-24  Mg     2.00     04-24    TPro  7.8  /  Alb  2.8[L]  /  TBili  3.7[H]  /  DBili  2.8[H]  /  AST  141[H]  /  ALT  80[H]  /  AlkPhos  476[H]  04-24      Urinalysis Basic - ( 24 Apr 2025 05:30 )    Color: x / Appearance: x / SG: x / pH: x  Gluc: 138 mg/dL / Ketone: x  / Bili: x / Urobili: x   Blood: x / Protein: x / Nitrite: x   Leuk Esterase: x / RBC: x / WBC x   Sq Epi: x / Non Sq Epi: x / Bacteria: x        Venous Blood Gas:  04-24 @ 05:30  7.39/39/63/24/93.0  VBG Lactate: 1.5   CHIEF COMPLAINT:Patient is a 44y old  Male who presents with a chief complaint of Referred by Hematologist for low Hg (24 Apr 2025 07:17)      INTERVAL EVENTS: no acute overnight events noted. Hgb stable post HSQ initiation. TOV ongoing. Remains on LVQ.     ROS: Unable to obtain ROS given patient is anoxic.    OBJECTIVE:  ICU Vital Signs Last 24 Hrs  T(C): 36.9 (25 Apr 2025 04:00), Max: 37.2 (24 Apr 2025 20:00)  T(F): 98.4 (25 Apr 2025 04:00), Max: 98.9 (24 Apr 2025 20:00)  HR: 118 (25 Apr 2025 04:00) (104 - 124)  BP: 151/96 (25 Apr 2025 04:00) (143/88 - 156/95)  BP(mean): 102 (25 Apr 2025 00:00) (102 - 102)  ABP: --  ABP(mean): --  RR: 20 (25 Apr 2025 04:00) (20 - 20)  SpO2: 100% (25 Apr 2025 04:00) (100% - 100%)    O2 Parameters below as of 25 Apr 2025 04:00      O2 Concentration (%): 50      Mode: AC/ CMV (Assist Control/ Continuous Mandatory Ventilation), RR (machine): 20, TV (machine): 460, FiO2: 30, PEEP: 6, ITime: 0.86, MAP: 11, PIP: 23    04-23 @ 07:01  -  04-24 @ 07:00  --------------------------------------------------------  IN: 2469 mL / OUT: 2050 mL / NET: 419 mL    04-24 @ 07:01  -  04-25 @ 06:55  --------------------------------------------------------  IN: 0 mL / OUT: 800 mL / NET: -800 mL      CAPILLARY BLOOD GLUCOSE      POCT Blood Glucose.: 128 mg/dL (24 Apr 2025 22:00)      PHYSICAL EXAM:  General: NAD   HEENT:(+) bite block in oral cavity.  Neck: (+) Trach tube noted, site c/d/i.  Cards: S1/S2, no murmurs   Pulm: CTA bilaterally. No wheezes.   Abdomen: Soft, NTND. (+) G tube noted, site c/d/i. Midline laparotomy incision with overlying dressing.   Extremities: Anasarca with b/l UE and LE edema. Extremities warm to touch.  Neurology: Anoxic. Nonverbal. Not following commands or tracking. Not withdrawing to pain.   Skin: warm to touch, color appropriate for ethnicity. Refer to RN assessment for further details.      Mode: AC/ CMV (Assist Control/ Continuous Mandatory Ventilation)  RR (machine): 20  TV (machine): 460  FiO2: 30  PEEP: 6  ITime: 0.86  MAP: 11  PIP: 23      HOSPITAL MEDICATIONS:  MEDICATIONS  (STANDING):  albuterol/ipratropium for Nebulization 3 milliLiter(s) Nebulizer every 6 hours  amLODIPine   Tablet 5 milliGRAM(s) Oral daily  bromocriptine Tablet 2.5 milliGRAM(s) Oral every 8 hours  chlorhexidine 0.12% Liquid 15 milliLiter(s) Oral Mucosa two times a day  chlorhexidine 2% Cloths 1 Application(s) Topical daily  clonazePAM  Tablet 2 milliGRAM(s) Oral every 8 hours  heparin   Injectable 5000 Unit(s) SubCutaneous every 12 hours  influenza   Vaccine 0.5 milliLiter(s) IntraMuscular once  levETIRAcetam  Solution 500 milliGRAM(s) Oral two times a day  levoFLOXacin IVPB 750 milliGRAM(s) IV Intermittent every 24 hours  pantoprazole   Suspension 40 milliGRAM(s) Oral every 12 hours  sodium bicarbonate 1300 milliGRAM(s) Oral three times a day    MEDICATIONS  (PRN):  HYDROmorphone  Injectable 0.5 milliGRAM(s) IV Push every 4 hours PRN dysynchronny      LABS:                        7.1    19.97 )-----------( 340      ( 25 Apr 2025 05:55 )             21.7     04-24    144  |  110[H]  |  53[H]  ----------------------------<  138[H]  3.9   |  21[L]  |  1.59[H]    Ca    9.6      24 Apr 2025 05:30  Phos  3.1     04-24  Mg     2.00     04-24    TPro  7.8  /  Alb  2.8[L]  /  TBili  3.7[H]  /  DBili  2.8[H]  /  AST  141[H]  /  ALT  80[H]  /  AlkPhos  476[H]  04-24      Urinalysis Basic - ( 24 Apr 2025 05:30 )    Color: x / Appearance: x / SG: x / pH: x  Gluc: 138 mg/dL / Ketone: x  / Bili: x / Urobili: x   Blood: x / Protein: x / Nitrite: x   Leuk Esterase: x / RBC: x / WBC x   Sq Epi: x / Non Sq Epi: x / Bacteria: x        Venous Blood Gas:  04-24 @ 05:30  7.39/39/63/24/93.0  VBG Lactate: 1.5

## 2025-04-25 NOTE — PROGRESS NOTE ADULT - ASSESSMENT
45 YO M with PMHx of sickle cell disease with prior acute chest syndrome requiring transfusion and exchange in the past (last 12/2024), iron overload, gout, HTN, and RUE DVT in 12/2024 on eliquis who presented on 3/15 by his hematologist second to anemia and hypoxia, down to 5.3 from baseline 7-8, with concern for vaso-occlusive crisis. While on medicine, anemia improved post transfusion and continued on SCC pain control PCA. Patient noted with left renal mass during prior admission and s/p L ureteroscopy with biopsy and stent placement on 3/19. Course complicated with hypoglycemia on 3/20 with RRT x 2 and found with severe anemia to 3.4 with hemorrhagic shock second to left perirenal and subcapsular hemorrhage and ultimately PEA arrest. ROSC achieved and transferred to MICU. s/p IR emobilization and course complicated anoxic brain injury, myoclonic jerks, GIB, MATA, HAGMA, DVTs (R IJ and LUE brachial), dysphagia, cirrhosis, and prolonged vent time s/p trach on 4/4. Transferred to [facility] on 4/18.     NEUROLOGY  # Anoxic brain injury   - s/p cardiac arrest on 3/20 with ROSC in 4 minutes   - CT HEAD post arrest with diffuse sulcal effacement with increased intracranial pressure, and few patchy foci of cortical blurring concern for HIE  - MRI brain post arrest with with diffuse global abnormal supratentorial cortical restricted diffusion consistent with global hypoxic injury   - Monitor for now    # Seizures vs myoclonic jerks   - Witness myoclonic jerking concerning for seizures with anoxic brain injury  - vEEG with abundant myoclonic seizures in the setting of a severe diffuse/multifocal cerebral dysfunction which can be seen in the setting of sedative effect or due to anoxia  - s/p valium 10 Q4H   - Continue on keppra   - Continue on klonopin 2mg TID   - Continue on dilaudid PRN   - Started Bromocriptine for possibility of central etiology of ongoing fever (4/23)    HEENT  - Noted with tongue occlusive trauma s/p bite block replacement by OMFS (4/23)  - Pending Dental eval for mouthguard placement 5/1 10AM    CARDIOLOGY  # Shock state likely hemorrhagic vs vasoplegia  - Acute renal bleed noted requiring multiple transfusions and pressors in ICU.   - TTE 3/22 with EF 63 with normal LVRVSF with PASP 49  - Weaned off pressors in ICU and complicated by hypertension.   - Started on norvasc 5, however dc'ed as BP improved on opioids   - Monitor BP   -resumed on norvasc 4/23    # Autonomic vs pain  - Mild HTN and tachypnea noted likely pain induced vs autonomia?   - Continue on dilaudid pushes PRN    PULMONARY  # Respiratory failure   - Presented with SCC and hypoxia likely from anemia with no signs of acute chest, however noted with PEA with hemorrhagic shock post renal bx requiring intubation.   - Prolonged intubation and s/p 7 PORTEX trach on 4/4   - Continue on vent 20/460/6/30   - BL LL atelectasis and continue on nebs with chest PT  - PIPs normal and holding IPV for now  - Continue on dilaudid PRN     GI  # Shock liver vs cirrhosis with sickle cell   - Transaminitis noted in ICU and thought to be second to shock liver likely second to hemorrhagic shock vs cardiac arrest, however LFTs remains elevated with acute on chronic hyperbilirubinemia.   - US ABD suggestive of early cirrhotic changes, cholecystectomy, and CBD 9mm   - CT AP with cirrhosis   - Trend LFTs and bili     # GIB  - Anemia with GIB noted in ICU   - s/p EGD 4/11 with cauterization of gastric ulcer & hemostatic spraying of three duodenal ulcers. Gastric ulcer likely due to NGT trauma.   - Continue on PPI BID   - Monitor stools and HH     # Oropharyngeal dysphagia   - GI unable to place PEG due to hepatomegaly.   - IR unable to place PEG second to no safe window   - s/p open G TUBE placement on 4/18   - Continue on TF    RENAL  # Acute renal failure and HAGMA second to shock state   - Scr on admission was 1.25 (3/15/25) and increased to 6s while in ICU and s/p HD 3/24-3/29 and then again on 4/4.   - L SHILE removed on 4/15   - CRE slowly improving but mild acidosis remains   - Continue on HCO3 1300 TID tabs for now   - Continue with daniel for now  - Monitor renal function, acidosis, and UOP   - TOV ongoing (4/23 - )    # L renal mass bx c/b hematoma   - Renal bx as below on 3/19  - Hemorrhagic shock and PEA arrest on 3/20   - Renal US with large L renal subcapsular hematoma.    - IR left renal angiogram and embolization on 3/20   - CT 4/6 with unchanged large left subcapsular and perinephric hematoma.  - CT A/P (4/20) showing stable subcapsular renal hematoma and NO new active bleeds.  - Monitor for now    # Hypernatremia  - s/p D5W   - Continue on  Q6H   - Monitor sodium     # Hyperphosphatemia   - Phos elevation likely second to renal failure   - PTH elevated, however Phos slowly down trending   - Monitor phos    INFECTIOUS DISEASE   UTI  - New fever with POSITIVE UA started on LVQ (4/23 - ) x5D  -BCx 4/23 NEG    # Fever   -(4/20_CXR with RLL infiltrate similar to prior, however slightly improved.   -(4/20)RVP negative   -(4/20) UA with pyuria   - PCT 0.5   - (4/20)  SCx and BCx -SC:x: commensal lay consistent with body site and few polymorphonecular leukocytes no squamous epithelial cells , few yeast like cells  .  -BCx NGT    # VAP   - Post arrest noted with concern for aspiration and VAP in ICU   - Flu, SCx, BAL, BCx and UCx negative   - MRSA PCR with MSSA and completed bactroban in ICU   - Completed aztreonam (3/22-3/27) then casey (3/27-3/31) and vanco by dose in ICU   - CXR in ICU with RUL consolidation and completed recurrent casey course in ICU (4/2-4/7).     HEMATOLOGIC  # Anemia with SCC vs L kidney bleed vs GIB   - Baseline hemoglobin outpatient ~6.0-7.0 and sent in for anemia down to 5.3 without signs of bleeding and more likely SCC.    - Transfusion given on admission and improved back to baseline.   - Course complicated by hemorrhagic shock from left renal hematoma and HH down to 3.4.  - s/p 10U PRBC total while in ICU   - s/p PRBC pre-GTUBE with HH increased to 8.0.   - CT/AP (4/20) Interval placement of gastrostomy tube without evidence of active GI   bleeding, Grossly stable left renal subcapsular hematoma without evidence of   active bleeding, Persistent small bilateral pleural effusions and airspace opacities.    - Monitor HH   -s/p 1 unit PRBC 4/22----hgb 5.9 -> 7.1    # SCD   - Discontinued deferasirox due to current renal failure   - Monitor Sickle labs QD (CBC with Diff, Retic, BMP, Hepatic Function, LDH and Hapto, and VBG).     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   - Heparin GTT held for G TUBE  - Given hemorrhagic shock history and dropping, DEFER FURTHER THERAPEUTIC ANTICOAGULATION   - Trialing on HSQ for DVT PPX (started 4/24)  - LUE precautions     # LUE arm infiltration   - HCO3 GTT infilitration in ICU   - Seen by hand sx and no compartment syndrome concern   - Monitor for now     # RUE blood infiltration   - RUE blood transfusion infiltration noted on 4/17 with area of ecchymosis   - RPT DOPPLER with known R IJ DVT, however no upper arm thrombus or issues in area of infiltration  - Monitor site for now    ENDOCRINE   # Glycemic control   - Monitor BG   - No hx of DM2     SKIN   - Wound care reccs appreciated    ETHICS   - FULL CODE     DISPO - TBD 43 YO M with PMHx of sickle cell disease with prior acute chest syndrome requiring transfusion and exchange in the past (last 12/2024), iron overload, gout, HTN, and RUE DVT in 12/2024 on eliquis who presented on 3/15 by his hematologist second to anemia and hypoxia, down to 5.3 from baseline 7-8, with concern for vaso-occlusive crisis. While on medicine, anemia improved post transfusion and continued on SCC pain control PCA. Patient noted with left renal mass during prior admission and s/p L ureteroscopy with biopsy and stent placement on 3/19. Course complicated with hypoglycemia on 3/20 with RRT x 2 and found with severe anemia to 3.4 with hemorrhagic shock second to left perirenal and subcapsular hemorrhage and ultimately PEA arrest. ROSC achieved and transferred to MICU. s/p IR emobilization and course complicated anoxic brain injury, myoclonic jerks, GIB, MATA, HAGMA, DVTs (R IJ and LUE brachial), dysphagia, cirrhosis, and prolonged vent time s/p trach on 4/4. Transferred to [facility] on 4/18.     NEUROLOGY  # Anoxic brain injury   - s/p cardiac arrest on 3/20 with ROSC in 4 minutes   - CT HEAD post arrest with diffuse sulcal effacement with increased intracranial pressure, and few patchy foci of cortical blurring concern for HIE  - MRI brain post arrest with with diffuse global abnormal supratentorial cortical restricted diffusion consistent with global hypoxic injury   - Monitor for now    # Seizures vs myoclonic jerks   - Witness myoclonic jerking concerning for seizures with anoxic brain injury  - vEEG with abundant myoclonic seizures in the setting of a severe diffuse/multifocal cerebral dysfunction which can be seen in the setting of sedative effect or due to anoxia  - s/p valium 10 Q4H   - Continue on keppra   - Continue on klonopin 2mg TID   - Continue on dilaudid PRN   - Started Bromocriptine for possibility of central etiology of ongoing fever (4/23)    HEENT  - Noted with tongue occlusive trauma s/p bite block replacement by OMFS (4/23)  - Pending Dental eval for mouthguard placement 5/1 10AM    CARDIOLOGY  # Shock state likely hemorrhagic vs vasoplegia  - Acute renal bleed noted requiring multiple transfusions and pressors in ICU.   - TTE 3/22 with EF 63 with normal LVRVSF with PASP 49  - Weaned off pressors in ICU and complicated by hypertension.   - Started on norvasc 5, however dc'ed as BP improved on opioids   - Monitor BP   -resumed on norvasc 4/23    # Autonomic vs pain  - Mild HTN and tachypnea noted likely pain induced vs autonomia?   - Continue on dilaudid pushes PRN    PULMONARY  # Respiratory failure   - Presented with SCC and hypoxia likely from anemia with no signs of acute chest, however noted with PEA with hemorrhagic shock post renal bx requiring intubation.   - Prolonged intubation and s/p 7 PORTEX trach on 4/4   - Continue on vent 20/460/6/30   - BL LL atelectasis and continue on nebs with chest PT  - PIPs normal and holding IPV for now  - Continue on dilaudid PRN     GI  # Shock liver vs cirrhosis with sickle cell   - Transaminitis noted in ICU and thought to be second to shock liver likely second to hemorrhagic shock vs cardiac arrest, however LFTs remains elevated with acute on chronic hyperbilirubinemia.   - US ABD suggestive of early cirrhotic changes, cholecystectomy, and CBD 9mm   - CT AP with cirrhosis   - Trend LFTs and bili     # GIB  - Anemia with GIB noted in ICU   - s/p EGD 4/11 with cauterization of gastric ulcer & hemostatic spraying of three duodenal ulcers. Gastric ulcer likely due to NGT trauma.   - Continue on PPI BID   - Monitor stools and HH     # Oropharyngeal dysphagia   - GI unable to place PEG due to hepatomegaly.   - IR unable to place PEG second to no safe window   - s/p open G TUBE placement on 4/18   - Continue on TF    RENAL  # Acute renal failure and HAGMA second to shock state   - Scr on admission was 1.25 (3/15/25) and increased to 6s while in ICU and s/p HD 3/24-3/29 and then again on 4/4.   - L SHILE removed on 4/15   - CRE slowly improving but mild acidosis remains   - Continue on HCO3 1300 TID tabs for now   - Continue with daniel for now  - Monitor renal function, acidosis, and UOP   - TOV ongoing (4/23 - )    # L renal mass bx c/b hematoma   - Renal bx as below on 3/19  - Hemorrhagic shock and PEA arrest on 3/20   - Renal US with large L renal subcapsular hematoma.    - IR left renal angiogram and embolization on 3/20   - CT 4/6 with unchanged large left subcapsular and perinephric hematoma.  - CT A/P (4/20) showing stable subcapsular renal hematoma and NO new active bleeds.  - Monitor for now    # Hypernatremia  - s/p D5W   - Continue on  Q6H   - Monitor sodium     # Hyperphosphatemia   - Phos elevation likely second to renal failure   - PTH elevated, however Phos slowly down trending   - Monitor phos    INFECTIOUS DISEASE   UTI  - New fever with POSITIVE UA started on LVQ (4/23 - ) x5D  - BCx 4/23 NEG  - No UCX sent    # Fever   -(4/20_CXR with RLL infiltrate similar to prior, however slightly improved.   -(4/20)RVP negative   -(4/20) UA with pyuria   - PCT 0.5   - (4/20)  SCx and BCx -SC:x: commensal lay consistent with body site and few polymorphonecular leukocytes no squamous epithelial cells , few yeast like cells  .  -BCx NGT    # VAP   - Post arrest noted with concern for aspiration and VAP in ICU   - Flu, SCx, BAL, BCx and UCx negative   - MRSA PCR with MSSA and completed bactroban in ICU   - Completed aztreonam (3/22-3/27) then casey (3/27-3/31) and vanco by dose in ICU   - CXR in ICU with RUL consolidation and completed recurrent casey course in ICU (4/2-4/7).     HEMATOLOGIC  # Anemia with SCC vs L kidney bleed vs GIB   - Baseline hemoglobin outpatient ~6.0-7.0 and sent in for anemia down to 5.3 without signs of bleeding and more likely SCC.    - Transfusion given on admission and improved back to baseline.   - Course complicated by hemorrhagic shock from left renal hematoma and HH down to 3.4.  - s/p 10U PRBC total while in ICU   - s/p PRBC pre-GTUBE with HH increased to 8.0.   - CT/AP (4/20) Interval placement of gastrostomy tube without evidence of active GI   bleeding, Grossly stable left renal subcapsular hematoma without evidence of   active bleeding, Persistent small bilateral pleural effusions and airspace opacities.    - Monitor HH   -s/p 1 unit PRBC 4/22----hgb 5.9 -> 7.1    # SCD   - Discontinued deferasirox due to current renal failure   - Monitor Sickle labs QD (CBC with Diff, Retic, BMP, Hepatic Function, LDH and Hapto, and VBG).     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   - Heparin GTT held for G TUBE  - Given hemorrhagic shock history and dropping, DEFER FURTHER THERAPEUTIC ANTICOAGULATION   - Trialing on HSQ for DVT PPX (started 4/24)  - LUE precautions     # LUE arm infiltration   - HCO3 GTT infilitration in ICU   - Seen by hand sx and no compartment syndrome concern   - Monitor for now     # RUE blood infiltration   - RUE blood transfusion infiltration noted on 4/17 with area of ecchymosis   - RPT DOPPLER with known R IJ DVT, however no upper arm thrombus or issues in area of infiltration  - Monitor site for now    ENDOCRINE   # Glycemic control   - Monitor BG   - No hx of DM2     SKIN   - Wound care reccs appreciated    ETHICS   - FULL CODE     DISPO - TBD

## 2025-04-26 LAB
ALBUMIN SERPL ELPH-MCNC: 2.8 G/DL — LOW (ref 3.3–5)
ALP SERPL-CCNC: 568 U/L — HIGH (ref 40–120)
ALT FLD-CCNC: 100 U/L — HIGH (ref 4–41)
ANION GAP SERPL CALC-SCNC: 12 MMOL/L — SIGNIFICANT CHANGE UP (ref 7–14)
AST SERPL-CCNC: 159 U/L — HIGH (ref 4–40)
BASOPHILS # BLD AUTO: 0.12 K/UL — SIGNIFICANT CHANGE UP (ref 0–0.2)
BASOPHILS NFR BLD AUTO: 0.8 % — SIGNIFICANT CHANGE UP (ref 0–2)
BILIRUB SERPL-MCNC: 3 MG/DL — HIGH (ref 0.2–1.2)
BUN SERPL-MCNC: 57 MG/DL — HIGH (ref 7–23)
CALCIUM SERPL-MCNC: 9.6 MG/DL — SIGNIFICANT CHANGE UP (ref 8.4–10.5)
CHLORIDE SERPL-SCNC: 112 MMOL/L — HIGH (ref 98–107)
CO2 SERPL-SCNC: 22 MMOL/L — SIGNIFICANT CHANGE UP (ref 22–31)
CREAT SERPL-MCNC: 1.59 MG/DL — HIGH (ref 0.5–1.3)
EGFR: 54 ML/MIN/1.73M2 — LOW
EGFR: 54 ML/MIN/1.73M2 — LOW
EOSINOPHIL # BLD AUTO: 0.36 K/UL — SIGNIFICANT CHANGE UP (ref 0–0.5)
EOSINOPHIL NFR BLD AUTO: 2.4 % — SIGNIFICANT CHANGE UP (ref 0–6)
GLUCOSE BLDC GLUCOMTR-MCNC: 129 MG/DL — HIGH (ref 70–99)
GLUCOSE SERPL-MCNC: 134 MG/DL — HIGH (ref 70–99)
HAPTOGLOB SERPL-MCNC: 55 MG/DL — SIGNIFICANT CHANGE UP (ref 34–200)
HCT VFR BLD CALC: 20.9 % — CRITICAL LOW (ref 39–50)
HCT VFR BLD CALC: 22 % — LOW (ref 39–50)
HGB BLD-MCNC: 7 G/DL — CRITICAL LOW (ref 13–17)
HGB BLD-MCNC: 7.1 G/DL — LOW (ref 13–17)
IANC: 11.55 K/UL — HIGH (ref 1.8–7.4)
IMM GRANULOCYTES NFR BLD AUTO: 0.9 % — SIGNIFICANT CHANGE UP (ref 0–0.9)
LDH SERPL L TO P-CCNC: 240 U/L — HIGH (ref 135–225)
LYMPHOCYTES # BLD AUTO: 1.88 K/UL — SIGNIFICANT CHANGE UP (ref 1–3.3)
LYMPHOCYTES # BLD AUTO: 12.7 % — LOW (ref 13–44)
MAGNESIUM SERPL-MCNC: 2.1 MG/DL — SIGNIFICANT CHANGE UP (ref 1.6–2.6)
MCHC RBC-ENTMCNC: 30.1 PG — SIGNIFICANT CHANGE UP (ref 27–34)
MCHC RBC-ENTMCNC: 30.4 PG — SIGNIFICANT CHANGE UP (ref 27–34)
MCHC RBC-ENTMCNC: 32.3 G/DL — SIGNIFICANT CHANGE UP (ref 32–36)
MCHC RBC-ENTMCNC: 33.5 G/DL — SIGNIFICANT CHANGE UP (ref 32–36)
MCV RBC AUTO: 90.9 FL — SIGNIFICANT CHANGE UP (ref 80–100)
MCV RBC AUTO: 93.2 FL — SIGNIFICANT CHANGE UP (ref 80–100)
MONOCYTES # BLD AUTO: 0.71 K/UL — SIGNIFICANT CHANGE UP (ref 0–0.9)
MONOCYTES NFR BLD AUTO: 4.8 % — SIGNIFICANT CHANGE UP (ref 2–14)
NEUTROPHILS # BLD AUTO: 11.55 K/UL — HIGH (ref 1.8–7.4)
NEUTROPHILS NFR BLD AUTO: 78.4 % — HIGH (ref 43–77)
NRBC # BLD AUTO: 0 K/UL — SIGNIFICANT CHANGE UP (ref 0–0)
NRBC # BLD AUTO: 0 K/UL — SIGNIFICANT CHANGE UP (ref 0–0)
NRBC # FLD: 0 K/UL — SIGNIFICANT CHANGE UP (ref 0–0)
NRBC # FLD: 0 K/UL — SIGNIFICANT CHANGE UP (ref 0–0)
NRBC BLD AUTO-RTO: 0 /100 WBCS — SIGNIFICANT CHANGE UP (ref 0–0)
NRBC BLD AUTO-RTO: 0 /100 WBCS — SIGNIFICANT CHANGE UP (ref 0–0)
PHOSPHATE SERPL-MCNC: 3.2 MG/DL — SIGNIFICANT CHANGE UP (ref 2.5–4.5)
PLATELET # BLD AUTO: 339 K/UL — SIGNIFICANT CHANGE UP (ref 150–400)
PLATELET # BLD AUTO: 346 K/UL — SIGNIFICANT CHANGE UP (ref 150–400)
POTASSIUM SERPL-MCNC: 4.4 MMOL/L — SIGNIFICANT CHANGE UP (ref 3.5–5.3)
POTASSIUM SERPL-SCNC: 4.4 MMOL/L — SIGNIFICANT CHANGE UP (ref 3.5–5.3)
PROT SERPL-MCNC: 7.3 G/DL — SIGNIFICANT CHANGE UP (ref 6–8.3)
RBC # BLD: 2.3 M/UL — LOW (ref 4.2–5.8)
RBC # BLD: 2.36 M/UL — LOW (ref 4.2–5.8)
RBC # BLD: 2.36 M/UL — LOW (ref 4.2–5.8)
RBC # FLD: 17 % — HIGH (ref 10.3–14.5)
RBC # FLD: 17.2 % — HIGH (ref 10.3–14.5)
RETICS #: 123 K/UL — SIGNIFICANT CHANGE UP (ref 25–125)
RETICS/RBC NFR: 5.2 % — HIGH (ref 0.5–2.5)
SODIUM SERPL-SCNC: 146 MMOL/L — HIGH (ref 135–145)
WBC # BLD: 14.76 K/UL — HIGH (ref 3.8–10.5)
WBC # BLD: 16.36 K/UL — HIGH (ref 3.8–10.5)
WBC # FLD AUTO: 14.76 K/UL — HIGH (ref 3.8–10.5)
WBC # FLD AUTO: 16.36 K/UL — HIGH (ref 3.8–10.5)

## 2025-04-26 PROCEDURE — 99233 SBSQ HOSP IP/OBS HIGH 50: CPT

## 2025-04-26 RX ADMIN — HEPARIN SODIUM 5000 UNIT(S): 1000 INJECTION INTRAVENOUS; SUBCUTANEOUS at 17:30

## 2025-04-26 RX ADMIN — Medication 40 MILLIGRAM(S): at 07:56

## 2025-04-26 RX ADMIN — Medication 1300 MILLIGRAM(S): at 14:37

## 2025-04-26 RX ADMIN — LEVETIRACETAM 500 MILLIGRAM(S): 10 INJECTION, SOLUTION INTRAVENOUS at 17:30

## 2025-04-26 RX ADMIN — Medication 15 MILLILITER(S): at 05:44

## 2025-04-26 RX ADMIN — CLONAZEPAM 2 MILLIGRAM(S): 0.5 TABLET ORAL at 05:44

## 2025-04-26 RX ADMIN — Medication 40 MILLIGRAM(S): at 17:30

## 2025-04-26 RX ADMIN — Medication 1300 MILLIGRAM(S): at 05:44

## 2025-04-26 RX ADMIN — AMLODIPINE BESYLATE 5 MILLIGRAM(S): 10 TABLET ORAL at 05:44

## 2025-04-26 RX ADMIN — Medication 1 APPLICATION(S): at 11:24

## 2025-04-26 RX ADMIN — IPRATROPIUM BROMIDE AND ALBUTEROL SULFATE 3 MILLILITER(S): .5; 2.5 SOLUTION RESPIRATORY (INHALATION) at 07:40

## 2025-04-26 RX ADMIN — LEVETIRACETAM 500 MILLIGRAM(S): 10 INJECTION, SOLUTION INTRAVENOUS at 05:43

## 2025-04-26 RX ADMIN — IPRATROPIUM BROMIDE AND ALBUTEROL SULFATE 3 MILLILITER(S): .5; 2.5 SOLUTION RESPIRATORY (INHALATION) at 15:20

## 2025-04-26 RX ADMIN — HEPARIN SODIUM 5000 UNIT(S): 1000 INJECTION INTRAVENOUS; SUBCUTANEOUS at 05:43

## 2025-04-26 RX ADMIN — Medication 15 MILLILITER(S): at 17:30

## 2025-04-26 RX ADMIN — Medication 1300 MILLIGRAM(S): at 21:03

## 2025-04-26 RX ADMIN — Medication 2.5 MILLIGRAM(S): at 14:37

## 2025-04-26 RX ADMIN — Medication 2.5 MILLIGRAM(S): at 05:44

## 2025-04-26 RX ADMIN — CLONAZEPAM 2 MILLIGRAM(S): 0.5 TABLET ORAL at 21:04

## 2025-04-26 RX ADMIN — CLONAZEPAM 2 MILLIGRAM(S): 0.5 TABLET ORAL at 14:38

## 2025-04-26 RX ADMIN — IPRATROPIUM BROMIDE AND ALBUTEROL SULFATE 3 MILLILITER(S): .5; 2.5 SOLUTION RESPIRATORY (INHALATION) at 03:33

## 2025-04-26 RX ADMIN — Medication 2.5 MILLIGRAM(S): at 21:02

## 2025-04-26 RX ADMIN — IPRATROPIUM BROMIDE AND ALBUTEROL SULFATE 3 MILLILITER(S): .5; 2.5 SOLUTION RESPIRATORY (INHALATION) at 21:47

## 2025-04-26 NOTE — PROGRESS NOTE ADULT - NS ATTEND AMEND GEN_ALL_CORE FT
44 year old male with a history of SSD, UE DVT, gout, HTN, with L renal mass admitted with acute anemia/ sickle cell crisis and evaluation of his left renal mass s/p left ureteroscopy and biopsy (3/19/25) with 24 hour post operative course complicated by rapid responses for hypoglycemia c/b cardiac arrest x 4 minutes CPR/1 epi with ROSC, transferred to MICU for further care     Post rosc found to be profoundly anemic, acidemic, with multiorgan dysfunction overall concerning for acute hemorrhagic shock.   Now with anoxia on CTH as well as MRI. Also with myclonus.  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     Patient after long GOC with family is now s/p trach and peg. H/H low but stable in setting of SSD. CT without source of bleeding. No longer requiring HD.     Having on and off fevers,. Possibly central fevers. Fevers improved with Levaquin and bromocriptine.   Tongue bitting s/p bite block. Awaiting mouth guard next week.     # Cardiac arrest  # Anoxic encephalopathy  # Myoclonus  # Anemia/SSD  # Transaminitis  # MATA 2/2 to ATN  # DVT  # Hyperbilirubinemia, Early cirrhosis?  # Urothelial Cancer.   # Fever  # Tongue bitting  - Cardiac arrest, short down time but with likely anoxic injury and multiorgan dysfunction in setting of hemorrhage, time of arrest Hg was 3 and hypoglycemic  - Repeat CT and MRI showing diffuse anoxia. Clinically doing poorly. Simulation myoclonus. C/W PO benzos as tolerated. PRN opoids.   - C/W vent, patient unlikely to be wean from the ventilator.   - Transfuse PRN, s/p embolization by IR. Hematoma stable on CT scan. Not tolerating full a/c.   - DVT in the upper ext likely related with catheter.  - SSD, monitor hemolysis labs. SS labs  - LFT elevated. continue to monitor for drug related injury  - MATA. s/p HD. Monitor urine output. Trend Cr.   - S/P courses of abx, on bromocryptine for possible central fevers with improvement in temperature trend. Recent cultures negative thus far  - Hand lesions. Initially consulted vascular now vascular recommending plastics/hand--> no surgical interventions.   - Biopsy showing Non-invasive papillary urothelial carcinoma. Not a candidate for DMT per Onc.   - Tongue bitting s/p OMFS assistance in placing bite block. Dental consult--> Mouth Guard next week.   - DVT ppx- SCD for now.   - Dispo- full code. Overall prognosis is poor given severe anoxia and untreatable cancer

## 2025-04-26 NOTE — PROGRESS NOTE ADULT - ASSESSMENT
45 YO M with PMHx of sickle cell disease with prior acute chest syndrome requiring transfusion and exchange in the past (last 12/2024), iron overload, gout, HTN, and RUE DVT in 12/2024 on eliquis who presented on 3/15 by his hematologist second to anemia and hypoxia, down to 5.3 from baseline 7-8, with concern for vaso-occlusive crisis. While on medicine, anemia improved post transfusion and continued on SCC pain control PCA. Patient noted with left renal mass during prior admission and s/p L ureteroscopy with biopsy and stent placement on 3/19. Course complicated with hypoglycemia on 3/20 with RRT x 2 and found with severe anemia to 3.4 with hemorrhagic shock second to left perirenal and subcapsular hemorrhage and ultimately PEA arrest. ROSC achieved and transferred to MICU. s/p IR emobilization and course complicated anoxic brain injury, myoclonic jerks, GIB, MATA, HAGMA, DVTs (R IJ and LUE brachial), dysphagia, cirrhosis, and prolonged vent time s/p trach on 4/4. Transferred to [facility] on 4/18.     NEUROLOGY  # Anoxic brain injury   - s/p cardiac arrest on 3/20 with ROSC in 4 minutes   - CT HEAD post arrest with diffuse sulcal effacement with increased intracranial pressure, and few patchy foci of cortical blurring concern for HIE  - MRI brain post arrest with with diffuse global abnormal supratentorial cortical restricted diffusion consistent with global hypoxic injury   - Monitor for now    # Seizures vs myoclonic jerks   - Witness myoclonic jerking concerning for seizures with anoxic brain injury  - vEEG with abundant myoclonic seizures in the setting of a severe diffuse/multifocal cerebral dysfunction which can be seen in the setting of sedative effect or due to anoxia  - s/p valium 10 Q4H   - Continue on keppra   - Continue on klonopin 2mg TID   - Continue on dilaudid PRN   - Started Bromocriptine for possibility of central etiology of ongoing fever (4/23)    HEENT  - Noted with tongue occlusive trauma s/p bite block replacement by OMFS (4/23)  - Pending Dental eval for mouthguard placement 5/1 10AM    CARDIOLOGY  # Shock state likely hemorrhagic vs vasoplegia  - Acute renal bleed noted requiring multiple transfusions and pressors in ICU.   - TTE 3/22 with EF 63 with normal LVRVSF with PASP 49  - Weaned off pressors in ICU and complicated by hypertension.   - Started on norvasc 5, however dc'ed as BP improved on opioids   - Monitor BP   -resumed on norvasc 4/23    # Autonomic vs pain  - Mild HTN and tachypnea noted likely pain induced vs autonomia?   - Continue on dilaudid pushes PRN    PULMONARY  # Respiratory failure   - Presented with SCC and hypoxia likely from anemia with no signs of acute chest, however noted with PEA with hemorrhagic shock post renal bx requiring intubation.   - Prolonged intubation and s/p 7 PORTEX trach on 4/4   - Continue on vent 20/460/6/30   - BL LL atelectasis and continue on nebs with chest PT  - PIPs normal and holding IPV for now  - Continue on dilaudid PRN     GI  # Shock liver vs cirrhosis with sickle cell   - Transaminitis noted in ICU and thought to be second to shock liver likely second to hemorrhagic shock vs cardiac arrest, however LFTs remains elevated with acute on chronic hyperbilirubinemia.   - US ABD suggestive of early cirrhotic changes, cholecystectomy, and CBD 9mm   - CT AP with cirrhosis   - Trend LFTs and bili     # GIB  - Anemia with GIB noted in ICU   - s/p EGD 4/11 with cauterization of gastric ulcer & hemostatic spraying of three duodenal ulcers. Gastric ulcer likely due to NGT trauma.   - Continue on PPI BID   - Monitor stools and HH     # Oropharyngeal dysphagia   - GI unable to place PEG due to hepatomegaly.   - IR unable to place PEG second to no safe window   - s/p open G TUBE placement on 4/18   - Continue on TF    RENAL  # Acute renal failure and HAGMA second to shock state   - Scr on admission was 1.25 (3/15/25) and increased to 6s while in ICU and s/p HD 3/24-3/29 and then again on 4/4.   - L SHILE removed on 4/15   - CRE slowly improving but mild acidosis remains   - Continue on HCO3 1300 TID tabs for now   - Continue with daniel for now  - Monitor renal function, acidosis, and UOP   - TOV ongoing (4/23 - )    # L renal mass bx c/b hematoma   - Renal bx as below on 3/19  - Hemorrhagic shock and PEA arrest on 3/20   - Renal US with large L renal subcapsular hematoma.    - IR left renal angiogram and embolization on 3/20   - CT 4/6 with unchanged large left subcapsular and perinephric hematoma.  - CT A/P (4/20) showing stable subcapsular renal hematoma and NO new active bleeds.  - Monitor for now    # Hypernatremia  - s/p D5W   - Continue on  Q6H   - Monitor sodium     # Hyperphosphatemia   - Phos elevation likely second to renal failure   - PTH elevated, however Phos slowly down trending   - Monitor phos    INFECTIOUS DISEASE   UTI  - New fever with POSITIVE UA started on LVQ (4/23 - ) x5D  - BCx 4/23 NEG  - No UCX sent    # Fever   -(4/20_CXR with RLL infiltrate similar to prior, however slightly improved.   -(4/20)RVP negative   -(4/20) UA with pyuria   - PCT 0.5   - (4/20)  SCx and BCx -SC:x: commensal lay consistent with body site and few polymorphonecular leukocytes no squamous epithelial cells , few yeast like cells  .  -BCx NGT    # VAP   - Post arrest noted with concern for aspiration and VAP in ICU   - Flu, SCx, BAL, BCx and UCx negative   - MRSA PCR with MSSA and completed bactroban in ICU   - Completed aztreonam (3/22-3/27) then casey (3/27-3/31) and vanco by dose in ICU   - CXR in ICU with RUL consolidation and completed recurrent casey course in ICU (4/2-4/7).     HEMATOLOGIC  # Anemia with SCC vs L kidney bleed vs GIB   - Baseline hemoglobin outpatient ~6.0-7.0 and sent in for anemia down to 5.3 without signs of bleeding and more likely SCC.    - Transfusion given on admission and improved back to baseline.   - Course complicated by hemorrhagic shock from left renal hematoma and HH down to 3.4.  - s/p 10U PRBC total while in ICU   - s/p PRBC pre-GTUBE with HH increased to 8.0.   - CT/AP (4/20) Interval placement of gastrostomy tube without evidence of active GI   bleeding, Grossly stable left renal subcapsular hematoma without evidence of   active bleeding, Persistent small bilateral pleural effusions and airspace opacities.    - Monitor HH   -s/p 1 unit PRBC 4/22----hgb 5.9 -> 7.1    # SCD   - Discontinued deferasirox due to current renal failure   - Monitor Sickle labs QD (CBC with Diff, Retic, BMP, Hepatic Function, LDH and Hapto, and VBG).     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   - Heparin GTT held for G TUBE  - Given hemorrhagic shock history and dropping, DEFER FURTHER THERAPEUTIC ANTICOAGULATION   - Trialing on HSQ for DVT PPX (started 4/24)  - LUE precautions     # LUE arm infiltration   - HCO3 GTT infilitration in ICU   - Seen by hand sx and no compartment syndrome concern   - Monitor for now     # RUE blood infiltration   - RUE blood transfusion infiltration noted on 4/17 with area of ecchymosis   - RPT DOPPLER with known R IJ DVT, however no upper arm thrombus or issues in area of infiltration  - Monitor site for now    ENDOCRINE   # Glycemic control   - Monitor BG   - No hx of DM2     SKIN   - Wound care reccs appreciated    ETHICS   - FULL CODE     DISPO - TBD 43 YO M with PMHx of sickle cell disease with prior acute chest syndrome requiring transfusion and exchange in the past (last 12/2024), iron overload, gout, HTN, and RUE DVT in 12/2024 on eliquis who presented on 3/15 by his hematologist second to anemia and hypoxia, down to 5.3 from baseline 7-8, with concern for vaso-occlusive crisis. While on medicine, anemia improved post transfusion and continued on SCC pain control PCA. Patient noted with left renal mass during prior admission and s/p L ureteroscopy with biopsy and stent placement on 3/19. Course complicated with hypoglycemia on 3/20 with RRT x 2 and found with severe anemia to 3.4 with hemorrhagic shock second to left perirenal and subcapsular hemorrhage and ultimately PEA arrest. ROSC achieved and transferred to MICU. s/p IR emobilization and course complicated anoxic brain injury, myoclonic jerks, GIB, MATA, HAGMA, DVTs (R IJ and LUE brachial), dysphagia, cirrhosis, and prolonged vent time s/p trach on 4/4. Transferred to [facility] on 4/18.     NEUROLOGY  # Anoxic brain injury   - s/p cardiac arrest on 3/20 with ROSC in 4 minutes   - CT HEAD post arrest with diffuse sulcal effacement with increased intracranial pressure, and few patchy foci of cortical blurring concern for HIE  - MRI brain post arrest with with diffuse global abnormal supratentorial cortical restricted diffusion consistent with global hypoxic injury   - Monitor for now    # Seizures vs myoclonic jerks   - Witness myoclonic jerking concerning for seizures with anoxic brain injury  - vEEG with abundant myoclonic seizures in the setting of a severe diffuse/multifocal cerebral dysfunction which can be seen in the setting of sedative effect or due to anoxia  - s/p valium 10 Q4H   - Continue on keppra   - Continue on klonopin 2mg TID   - Continue on dilaudid PRN   - Started Bromocriptine for possibility of central etiology of ongoing fever (4/23)    HEENT  - Noted with tongue occlusive trauma s/p bite block replacement by OMFS (4/23)  - Mouth care   - Pending Dental eval for mouthguard placement 5/1 10AM    CARDIOLOGY  # Shock state likely hemorrhagic vs vasoplegia  - Acute renal bleed noted requiring multiple transfusions and pressors in ICU.   - TTE 3/22 with EF 63 with normal LVRVSF with PASP 49  - Weaned off pressors in ICU and complicated by hypertension.   - Started on norvasc 5, however dc'ed as BP improved on opioids   - Monitor BP   -resumed on norvasc 4/23    # Autonomic vs pain  - Mild HTN and tachypnea noted likely pain induced vs autonomia?   - Continue on dilaudid pushes PRN    PULMONARY  # Respiratory failure   - Presented with SCC and hypoxia likely from anemia with no signs of acute chest, however noted with PEA with hemorrhagic shock post renal bx requiring intubation.   - Prolonged intubation and s/p 7 PORTEX trach on 4/4   - Continue on vent 20/460/6/30   - BL LL atelectasis and continue on nebs with chest PT  - PIPs normal and holding IPV for now  - Continue on dilaudid PRN     GI  # Shock liver vs cirrhosis with sickle cell   - Transaminitis noted in ICU and thought to be second to shock liver likely second to hemorrhagic shock vs cardiac arrest, however LFTs remains elevated with acute on chronic hyperbilirubinemia.   - US ABD suggestive of early cirrhotic changes, cholecystectomy, and CBD 9mm   - CT AP with cirrhosis   - Trend LFTs and bili     # GIB  - Anemia with GIB noted in ICU   - s/p EGD 4/11 with cauterization of gastric ulcer & hemostatic spraying of three duodenal ulcers. Gastric ulcer likely due to NGT trauma.   - Continue on PPI BID   - Monitor stools and HH     # Oropharyngeal dysphagia   - GI unable to place PEG due to hepatomegaly.   - IR unable to place PEG second to no safe window   - s/p open G TUBE placement on 4/18   - Continue on TF    RENAL  # Acute renal failure and HAGMA second to shock state   - Scr on admission was 1.25 (3/15/25) and increased to 6s while in ICU and s/p HD 3/24-3/29 and then again on 4/4.   - L SHILE removed on 4/15   - CRE slowly improving but mild acidosis remains   - Continue on HCO3 1300 TID tabs for now   - Continue with daniel for now  - Monitor renal function, acidosis, and UOP   - TOV ongoing (4/23 - )    # L renal mass bx c/b hematoma   - Renal bx as below on 3/19  - Hemorrhagic shock and PEA arrest on 3/20   - Renal US with large L renal subcapsular hematoma.    - IR left renal angiogram and embolization on 3/20   - CT 4/6 with unchanged large left subcapsular and perinephric hematoma.  - CT A/P (4/20) showing stable subcapsular renal hematoma and NO new active bleeds.  - Monitor for now    # Hypernatremia  - s/p D5W   - Continue on  Q6H   - Monitor sodium     # Hyperphosphatemia   - Phos elevation likely second to renal failure   - PTH elevated, however Phos slowly down trending   - Monitor phos    INFECTIOUS DISEASE   UTI  - New fever with POSITIVE UA started on LVQ (4/23 - ) x5D  - BCx 4/23 NEG  - No UCX sent    # Fever   -(4/20_CXR with RLL infiltrate similar to prior, however slightly improved.   -(4/20)RVP negative   -(4/20) UA with pyuria   - PCT 0.5   - (4/20)  SCx and BCx -SC:x: commensal lay consistent with body site and few polymorphonecular leukocytes no squamous epithelial cells , few yeast like cells  .  -BCx NGT    # VAP   - Post arrest noted with concern for aspiration and VAP in ICU   - Flu, SCx, BAL, BCx and UCx negative   - MRSA PCR with MSSA and completed bactroban in ICU   - Completed aztreonam (3/22-3/27) then casey (3/27-3/31) and vanco by dose in ICU   - CXR in ICU with RUL consolidation and completed recurrent casey course in ICU (4/2-4/7).     HEMATOLOGIC  # Anemia with SCC vs L kidney bleed vs GIB   - Baseline hemoglobin outpatient ~6.0-7.0 and sent in for anemia down to 5.3 without signs of bleeding and more likely SCC.    - Transfusion given on admission and improved back to baseline.   - Course complicated by hemorrhagic shock from left renal hematoma and HH down to 3.4.  - s/p 10U PRBC total while in ICU   - s/p PRBC pre-GTUBE with HH increased to 8.0.   - CT/AP (4/20) Interval placement of gastrostomy tube without evidence of active GI   bleeding, Grossly stable left renal subcapsular hematoma without evidence of   active bleeding, Persistent small bilateral pleural effusions and airspace opacities.    - Monitor HH   -s/p 1 unit PRBC 4/22----hgb 5.9 -> 7.1    # SCD   - Discontinued deferasirox due to current renal failure   - Monitor Sickle labs QD (CBC with Diff, Retic, BMP, Hepatic Function, LDH and Hapto, and VBG).     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   - Heparin GTT held for G TUBE  - Given hemorrhagic shock history and dropping, DEFER FURTHER THERAPEUTIC ANTICOAGULATION   - Trialing on HSQ for DVT PPX (started 4/24)  - LUE precautions     # LUE arm infiltration   - HCO3 GTT infilitration in ICU   - Seen by hand sx and no compartment syndrome concern   - Monitor for now     # RUE blood infiltration   - RUE blood transfusion infiltration noted on 4/17 with area of ecchymosis   - RPT DOPPLER with known R IJ DVT, however no upper arm thrombus or issues in area of infiltration  - Monitor site for now    ENDOCRINE   # Glycemic control   - Monitor BG   - No hx of DM2     SKIN   - Wound care reccs appreciated    ETHICS   - FULL CODE     DISPO - TBD

## 2025-04-26 NOTE — PROGRESS NOTE ADULT - SUBJECTIVE AND OBJECTIVE BOX
CHIEF COMPLAINT: Patient is a 44y old  Male who presents with a chief complaint of Referred by Hematologist for low Hg (25 Apr 2025 06:55)      INTERVAL EVENTS: No overnight events     ROS: Seen by bedside during AM rounds     OBJECTIVE:  ICU Vital Signs Last 24 Hrs  T(C): 37.7 (26 Apr 2025 05:35), Max: 37.7 (26 Apr 2025 05:35)  T(F): 99.8 (26 Apr 2025 05:35), Max: 99.8 (26 Apr 2025 05:35)  HR: 110 (26 Apr 2025 07:02) (102 - 115)  BP: 120/77 (26 Apr 2025 05:35) (120/77 - 149/97)  BP(mean): 99 (25 Apr 2025 16:00) (99 - 99)  ABP: --  ABP(mean): --  RR: 20 (26 Apr 2025 05:35) (20 - 24)  SpO2: 100% (26 Apr 2025 07:02) (98% - 100%)    O2 Parameters below as of 26 Apr 2025 05:35  Patient On (Oxygen Delivery Method): ventilator    O2 Concentration (%): 40      Mode: AC/ CMV (Assist Control/ Continuous Mandatory Ventilation), RR (machine): 20, TV (machine): 460, FiO2: 100, PEEP: 6, ITime: 0.87, MAP: 12, PIP: 26    04-25 @ 07:01  -  04-26 @ 07:00  --------------------------------------------------------  IN: 2430 mL / OUT: 2100 mL / NET: 330 mL      CAPILLARY BLOOD GLUCOSE      POCT Blood Glucose.: 136 mg/dL (25 Apr 2025 23:39)      PHYSICAL EXAM:  General: NAD   HEENT:(+) bite block in oral cavity.  Neck: (+) Trach tube noted, site c/d/i.  Cards: S1/S2, no murmurs   Pulm: CTA bilaterally. No wheezes.   Abdomen: Soft, NTND. (+) G tube noted, site c/d/i. Midline laparotomy incision with overlying dressing.   Extremities: Anasarca with b/l UE and LE edema. Extremities warm to touch.  Neurology: Anoxic. Nonverbal. Not following commands or tracking. Not withdrawing to pain.   Skin: warm to touch, color appropriate for ethnicity. Refer to RN assessment for further details.    Mode: AC/ CMV (Assist Control/ Continuous Mandatory Ventilation)  RR (machine): 20  TV (machine): 460  FiO2: 100  PEEP: 6  ITime: 0.87  MAP: 12  PIP: 26      HOSPITAL MEDICATIONS:  MEDICATIONS  (STANDING):  albuterol/ipratropium for Nebulization 3 milliLiter(s) Nebulizer every 6 hours  amLODIPine   Tablet 5 milliGRAM(s) Oral daily  bromocriptine Tablet 2.5 milliGRAM(s) Oral every 8 hours  chlorhexidine 0.12% Liquid 15 milliLiter(s) Oral Mucosa two times a day  chlorhexidine 2% Cloths 1 Application(s) Topical daily  clonazePAM  Tablet 2 milliGRAM(s) Oral every 8 hours  heparin   Injectable 5000 Unit(s) SubCutaneous every 12 hours  influenza   Vaccine 0.5 milliLiter(s) IntraMuscular once  levETIRAcetam  Solution 500 milliGRAM(s) Oral two times a day  levoFLOXacin IVPB 750 milliGRAM(s) IV Intermittent every 24 hours  pantoprazole   Suspension 40 milliGRAM(s) Oral every 12 hours  sodium bicarbonate 1300 milliGRAM(s) Oral three times a day    MEDICATIONS  (PRN):  HYDROmorphone  Injectable 0.5 milliGRAM(s) IV Push every 4 hours PRN dysynchronny      LABS:                        7.1    14.76 )-----------( 346      ( 26 Apr 2025 06:10 )             22.0     04-26    146[H]  |  112[H]  |  57[H]  ----------------------------<  134[H]  4.4   |  22  |  1.59[H]    Ca    9.6      26 Apr 2025 06:10  Phos  3.2     04-26  Mg     2.10     04-26    TPro  7.3  /  Alb  2.8[L]  /  TBili  3.0[H]  /  DBili  x   /  AST  159[H]  /  ALT  100[H]  /  AlkPhos  568[H]  04-26      Urinalysis Basic - ( 26 Apr 2025 06:10 )    Color: x / Appearance: x / SG: x / pH: x  Gluc: 134 mg/dL / Ketone: x  / Bili: x / Urobili: x   Blood: x / Protein: x / Nitrite: x   Leuk Esterase: x / RBC: x / WBC x   Sq Epi: x / Non Sq Epi: x / Bacteria: x         CHIEF COMPLAINT: Patient is a 44y old  Male who presents with a chief complaint of Referred by Hematologist for low Hg (25 Apr 2025 06:55)      INTERVAL EVENTS: No overnight events     ROS: Seen by bedside during AM rounds     OBJECTIVE:  ICU Vital Signs Last 24 Hrs  T(C): 37.7 (26 Apr 2025 05:35), Max: 37.7 (26 Apr 2025 05:35)  T(F): 99.8 (26 Apr 2025 05:35), Max: 99.8 (26 Apr 2025 05:35)  HR: 110 (26 Apr 2025 07:02) (102 - 115)  BP: 120/77 (26 Apr 2025 05:35) (120/77 - 149/97)  BP(mean): 99 (25 Apr 2025 16:00) (99 - 99)  ABP: --  ABP(mean): --  RR: 20 (26 Apr 2025 05:35) (20 - 24)  SpO2: 100% (26 Apr 2025 07:02) (98% - 100%)    O2 Parameters below as of 26 Apr 2025 05:35  Patient On (Oxygen Delivery Method): ventilator    O2 Concentration (%): 40      Mode: AC/ CMV (Assist Control/ Continuous Mandatory Ventilation), RR (machine): 20, TV (machine): 460, FiO2: 100, PEEP: 6, ITime: 0.87, MAP: 12, PIP: 26    04-25 @ 07:01  -  04-26 @ 07:00  --------------------------------------------------------  IN: 2430 mL / OUT: 2100 mL / NET: 330 mL      CAPILLARY BLOOD GLUCOSE      POCT Blood Glucose.: 136 mg/dL (25 Apr 2025 23:39)      PHYSICAL EXAM:  General: NAD   HEENT:(+) bite block in oral cavity.  Neck: (+) Trach tube to vent noted, site c/d/i.  Cards: S1/S2, no murmurs   Pulm: Clear bilaterally. No wheezes.   Abdomen: Soft, NTND. (+) G tube noted, site c/d/i. Midline laparotomy incision with overlying dressing.   Extremities: Anasarca with b/l UE and LE edema. Extremities warm to touch.  Neurology: Anoxic. Nonverbal. Not following commands or tracking. Not withdrawing to pain.   Skin: warm to touch, color appropriate for ethnicity. Refer to RN assessment for further details.    Mode: AC/ CMV (Assist Control/ Continuous Mandatory Ventilation)  RR (machine): 20  TV (machine): 460  FiO2: 100  PEEP: 6  ITime: 0.87  MAP: 12  PIP: 26      HOSPITAL MEDICATIONS:  MEDICATIONS  (STANDING):  albuterol/ipratropium for Nebulization 3 milliLiter(s) Nebulizer every 6 hours  amLODIPine   Tablet 5 milliGRAM(s) Oral daily  bromocriptine Tablet 2.5 milliGRAM(s) Oral every 8 hours  chlorhexidine 0.12% Liquid 15 milliLiter(s) Oral Mucosa two times a day  chlorhexidine 2% Cloths 1 Application(s) Topical daily  clonazePAM  Tablet 2 milliGRAM(s) Oral every 8 hours  heparin   Injectable 5000 Unit(s) SubCutaneous every 12 hours  influenza   Vaccine 0.5 milliLiter(s) IntraMuscular once  levETIRAcetam  Solution 500 milliGRAM(s) Oral two times a day  levoFLOXacin IVPB 750 milliGRAM(s) IV Intermittent every 24 hours  pantoprazole   Suspension 40 milliGRAM(s) Oral every 12 hours  sodium bicarbonate 1300 milliGRAM(s) Oral three times a day    MEDICATIONS  (PRN):  HYDROmorphone  Injectable 0.5 milliGRAM(s) IV Push every 4 hours PRN dysynchronny      LABS:                        7.1    14.76 )-----------( 346      ( 26 Apr 2025 06:10 )             22.0     04-26    146[H]  |  112[H]  |  57[H]  ----------------------------<  134[H]  4.4   |  22  |  1.59[H]    Ca    9.6      26 Apr 2025 06:10  Phos  3.2     04-26  Mg     2.10     04-26    TPro  7.3  /  Alb  2.8[L]  /  TBili  3.0[H]  /  DBili  x   /  AST  159[H]  /  ALT  100[H]  /  AlkPhos  568[H]  04-26      Urinalysis Basic - ( 26 Apr 2025 06:10 )    Color: x / Appearance: x / SG: x / pH: x  Gluc: 134 mg/dL / Ketone: x  / Bili: x / Urobili: x   Blood: x / Protein: x / Nitrite: x   Leuk Esterase: x / RBC: x / WBC x   Sq Epi: x / Non Sq Epi: x / Bacteria: x

## 2025-04-27 LAB
ALBUMIN SERPL ELPH-MCNC: 2.9 G/DL — LOW (ref 3.3–5)
ALP SERPL-CCNC: 595 U/L — HIGH (ref 40–120)
ALT FLD-CCNC: 100 U/L — HIGH (ref 4–41)
ANION GAP SERPL CALC-SCNC: 11 MMOL/L — SIGNIFICANT CHANGE UP (ref 7–14)
AST SERPL-CCNC: 147 U/L — HIGH (ref 4–40)
BASOPHILS # BLD AUTO: 0.15 K/UL — SIGNIFICANT CHANGE UP (ref 0–0.2)
BASOPHILS NFR BLD AUTO: 0.9 % — SIGNIFICANT CHANGE UP (ref 0–2)
BILIRUB SERPL-MCNC: 3.1 MG/DL — HIGH (ref 0.2–1.2)
BUN SERPL-MCNC: 59 MG/DL — HIGH (ref 7–23)
CALCIUM SERPL-MCNC: 10 MG/DL — SIGNIFICANT CHANGE UP (ref 8.4–10.5)
CHLORIDE SERPL-SCNC: 112 MMOL/L — HIGH (ref 98–107)
CO2 SERPL-SCNC: 22 MMOL/L — SIGNIFICANT CHANGE UP (ref 22–31)
CREAT SERPL-MCNC: 1.58 MG/DL — HIGH (ref 0.5–1.3)
EGFR: 55 ML/MIN/1.73M2 — LOW
EGFR: 55 ML/MIN/1.73M2 — LOW
EOSINOPHIL # BLD AUTO: 0.3 K/UL — SIGNIFICANT CHANGE UP (ref 0–0.5)
EOSINOPHIL NFR BLD AUTO: 1.9 % — SIGNIFICANT CHANGE UP (ref 0–6)
GLUCOSE BLDC GLUCOMTR-MCNC: 120 MG/DL — HIGH (ref 70–99)
GLUCOSE BLDC GLUCOMTR-MCNC: 134 MG/DL — HIGH (ref 70–99)
GLUCOSE SERPL-MCNC: 145 MG/DL — HIGH (ref 70–99)
HAPTOGLOB SERPL-MCNC: 57 MG/DL — SIGNIFICANT CHANGE UP (ref 34–200)
HCT VFR BLD CALC: 23.2 % — LOW (ref 39–50)
HGB BLD-MCNC: 7.4 G/DL — LOW (ref 13–17)
IANC: 12.85 K/UL — HIGH (ref 1.8–7.4)
IMM GRANULOCYTES NFR BLD AUTO: 1.2 % — HIGH (ref 0–0.9)
LDH SERPL L TO P-CCNC: 243 U/L — HIGH (ref 135–225)
LYMPHOCYTES # BLD AUTO: 1.48 K/UL — SIGNIFICANT CHANGE UP (ref 1–3.3)
LYMPHOCYTES # BLD AUTO: 9.3 % — LOW (ref 13–44)
MAGNESIUM SERPL-MCNC: 2.1 MG/DL — SIGNIFICANT CHANGE UP (ref 1.6–2.6)
MCHC RBC-ENTMCNC: 29.6 PG — SIGNIFICANT CHANGE UP (ref 27–34)
MCHC RBC-ENTMCNC: 31.9 G/DL — LOW (ref 32–36)
MCV RBC AUTO: 92.8 FL — SIGNIFICANT CHANGE UP (ref 80–100)
MONOCYTES # BLD AUTO: 0.9 K/UL — SIGNIFICANT CHANGE UP (ref 0–0.9)
MONOCYTES NFR BLD AUTO: 5.7 % — SIGNIFICANT CHANGE UP (ref 2–14)
NEUTROPHILS # BLD AUTO: 12.85 K/UL — HIGH (ref 1.8–7.4)
NEUTROPHILS NFR BLD AUTO: 81 % — HIGH (ref 43–77)
NRBC # BLD AUTO: 0 K/UL — SIGNIFICANT CHANGE UP (ref 0–0)
NRBC # FLD: 0 K/UL — SIGNIFICANT CHANGE UP (ref 0–0)
NRBC BLD AUTO-RTO: 0 /100 WBCS — SIGNIFICANT CHANGE UP (ref 0–0)
PHOSPHATE SERPL-MCNC: 3.7 MG/DL — SIGNIFICANT CHANGE UP (ref 2.5–4.5)
PLATELET # BLD AUTO: 355 K/UL — SIGNIFICANT CHANGE UP (ref 150–400)
POTASSIUM SERPL-MCNC: 4.5 MMOL/L — SIGNIFICANT CHANGE UP (ref 3.5–5.3)
POTASSIUM SERPL-SCNC: 4.5 MMOL/L — SIGNIFICANT CHANGE UP (ref 3.5–5.3)
PROT SERPL-MCNC: 7.8 G/DL — SIGNIFICANT CHANGE UP (ref 6–8.3)
RBC # BLD: 2.5 M/UL — LOW (ref 4.2–5.8)
RBC # FLD: 16.9 % — HIGH (ref 10.3–14.5)
SODIUM SERPL-SCNC: 145 MMOL/L — SIGNIFICANT CHANGE UP (ref 135–145)
WBC # BLD: 15.87 K/UL — HIGH (ref 3.8–10.5)
WBC # FLD AUTO: 15.87 K/UL — HIGH (ref 3.8–10.5)

## 2025-04-27 PROCEDURE — 99233 SBSQ HOSP IP/OBS HIGH 50: CPT

## 2025-04-27 RX ADMIN — Medication 1300 MILLIGRAM(S): at 21:02

## 2025-04-27 RX ADMIN — IPRATROPIUM BROMIDE AND ALBUTEROL SULFATE 3 MILLILITER(S): .5; 2.5 SOLUTION RESPIRATORY (INHALATION) at 07:48

## 2025-04-27 RX ADMIN — CLONAZEPAM 2 MILLIGRAM(S): 0.5 TABLET ORAL at 21:02

## 2025-04-27 RX ADMIN — Medication 2.5 MILLIGRAM(S): at 05:02

## 2025-04-27 RX ADMIN — CLONAZEPAM 2 MILLIGRAM(S): 0.5 TABLET ORAL at 05:02

## 2025-04-27 RX ADMIN — LEVETIRACETAM 500 MILLIGRAM(S): 10 INJECTION, SOLUTION INTRAVENOUS at 05:01

## 2025-04-27 RX ADMIN — Medication 40 MILLIGRAM(S): at 05:02

## 2025-04-27 RX ADMIN — Medication 15 MILLILITER(S): at 05:03

## 2025-04-27 RX ADMIN — IPRATROPIUM BROMIDE AND ALBUTEROL SULFATE 3 MILLILITER(S): .5; 2.5 SOLUTION RESPIRATORY (INHALATION) at 03:31

## 2025-04-27 RX ADMIN — Medication 1300 MILLIGRAM(S): at 14:11

## 2025-04-27 RX ADMIN — HEPARIN SODIUM 5000 UNIT(S): 1000 INJECTION INTRAVENOUS; SUBCUTANEOUS at 17:14

## 2025-04-27 RX ADMIN — LEVETIRACETAM 500 MILLIGRAM(S): 10 INJECTION, SOLUTION INTRAVENOUS at 17:14

## 2025-04-27 RX ADMIN — CLONAZEPAM 2 MILLIGRAM(S): 0.5 TABLET ORAL at 14:12

## 2025-04-27 RX ADMIN — Medication 15 MILLILITER(S): at 17:15

## 2025-04-27 RX ADMIN — Medication 1 APPLICATION(S): at 11:30

## 2025-04-27 RX ADMIN — HEPARIN SODIUM 5000 UNIT(S): 1000 INJECTION INTRAVENOUS; SUBCUTANEOUS at 05:05

## 2025-04-27 RX ADMIN — AMLODIPINE BESYLATE 5 MILLIGRAM(S): 10 TABLET ORAL at 05:02

## 2025-04-27 RX ADMIN — Medication 40 MILLIGRAM(S): at 17:14

## 2025-04-27 RX ADMIN — IPRATROPIUM BROMIDE AND ALBUTEROL SULFATE 3 MILLILITER(S): .5; 2.5 SOLUTION RESPIRATORY (INHALATION) at 21:36

## 2025-04-27 RX ADMIN — IPRATROPIUM BROMIDE AND ALBUTEROL SULFATE 3 MILLILITER(S): .5; 2.5 SOLUTION RESPIRATORY (INHALATION) at 15:40

## 2025-04-27 RX ADMIN — Medication 1300 MILLIGRAM(S): at 05:01

## 2025-04-27 NOTE — PROGRESS NOTE ADULT - SUBJECTIVE AND OBJECTIVE BOX
CHIEF COMPLAINT: Patient is a 44y old  Male who presents with a chief complaint of Referred by Hematologist for low Hg (26 Apr 2025 07:46)      INTERVAL EVENTS: no overnight events     ROS: Seen by bedside during AM rounds     OBJECTIVE:  ICU Vital Signs Last 24 Hrs  T(C): 37.1 (27 Apr 2025 04:00), Max: 37.5 (26 Apr 2025 08:00)  T(F): 98.8 (27 Apr 2025 04:00), Max: 99.5 (26 Apr 2025 08:00)  HR: 121 (27 Apr 2025 04:00) (101 - 121)  BP: 145/86 (27 Apr 2025 04:00) (129/88 - 145/86)  BP(mean): 101 (27 Apr 2025 04:00) (101 - 103)  ABP: --  ABP(mean): --  RR: 24 (27 Apr 2025 04:00) (20 - 24)  SpO2: 97% (27 Apr 2025 04:00) (97% - 100%)    O2 Parameters below as of 27 Apr 2025 04:00  Patient On (Oxygen Delivery Method): ventilator    O2 Concentration (%): 60      Mode: AC/ CMV (Assist Control/ Continuous Mandatory Ventilation), RR (machine): 20, TV (machine): 460, FiO2: 40, PEEP: 6, ITime: 0.86, MAP: 11, PIP: 26    04-26 @ 07:01  -  04-27 @ 07:00  --------------------------------------------------------  IN: 1150 mL / OUT: 1300 mL / NET: -150 mL      CAPILLARY BLOOD GLUCOSE      POCT Blood Glucose.: 134 mg/dL (27 Apr 2025 00:27)      PHYSICAL EXAM:  General: NAD   HEENT:(+) bite block in oral cavity.  Neck: (+) Trach tube to vent noted, site c/d/i.  Cards: S1/S2, no murmurs   Pulm: Clear bilaterally. No wheezes.   Abdomen: Soft, N TND. (+) G tube noted, site c/d/i. Midline laparotomy incision with overlying dressing.   Extremities: Anasarca with b/l UE and LE edema. Extremities warm to touch.  Neurology: Anoxic. Nonverbal. Not following commands or tracking. Not withdrawing to pain.   Skin: warm to touch, color appropriate for ethnicity. Refer to RN assessment for further details.    Mode: AC/ CMV (Assist Control/ Continuous Mandatory Ventilation)  RR (machine): 20  TV (machine): 460  FiO2: 40  PEEP: 6  ITime: 0.86  MAP: 11  PIP: 26      HOSPITAL MEDICATIONS:  MEDICATIONS  (STANDING):  albuterol/ipratropium for Nebulization 3 milliLiter(s) Nebulizer every 6 hours  amLODIPine   Tablet 5 milliGRAM(s) Oral daily  bromocriptine Tablet 2.5 milliGRAM(s) Oral every 8 hours  chlorhexidine 0.12% Liquid 15 milliLiter(s) Oral Mucosa two times a day  chlorhexidine 2% Cloths 1 Application(s) Topical daily  clonazePAM  Tablet 2 milliGRAM(s) Oral every 8 hours  heparin   Injectable 5000 Unit(s) SubCutaneous every 12 hours  influenza   Vaccine 0.5 milliLiter(s) IntraMuscular once  levETIRAcetam  Solution 500 milliGRAM(s) Oral two times a day  levoFLOXacin IVPB 750 milliGRAM(s) IV Intermittent every 24 hours  pantoprazole   Suspension 40 milliGRAM(s) Oral every 12 hours  sodium bicarbonate 1300 milliGRAM(s) Oral three times a day    MEDICATIONS  (PRN):  HYDROmorphone  Injectable 0.5 milliGRAM(s) IV Push every 4 hours PRN dysynchronny      LABS:                        7.4    15.87 )-----------( 355      ( 27 Apr 2025 05:30 )             23.2     04-27    145  |  112[H]  |  59[H]  ----------------------------<  145[H]  4.5   |  22  |  1.58[H]    Ca    10.0      27 Apr 2025 05:30  Phos  3.7     04-27  Mg     2.10     04-27    TPro  7.8  /  Alb  2.9[L]  /  TBili  3.1[H]  /  DBili  x   /  AST  147[H]  /  ALT  100[H]  /  AlkPhos  595[H]  04-27      Urinalysis Basic - ( 27 Apr 2025 05:30 )    Color: x / Appearance: x / SG: x / pH: x  Gluc: 145 mg/dL / Ketone: x  / Bili: x / Urobili: x   Blood: x / Protein: x / Nitrite: x   Leuk Esterase: x / RBC: x / WBC x   Sq Epi: x / Non Sq Epi: x / Bacteria: x

## 2025-04-27 NOTE — PROGRESS NOTE ADULT - ASSESSMENT
43 YO M with PMHx of sickle cell disease with prior acute chest syndrome requiring transfusion and exchange in the past (last 12/2024), iron overload, gout, HTN, and RUE DVT in 12/2024 on eliquis who presented on 3/15 by his hematologist second to anemia and hypoxia, down to 5.3 from baseline 7-8, with concern for vaso-occlusive crisis. While on medicine, anemia improved post transfusion and continued on SCC pain control PCA. Patient noted with left renal mass during prior admission and s/p L ureteroscopy with biopsy and stent placement on 3/19. Course complicated with hypoglycemia on 3/20 with RRT x 2 and found with severe anemia to 3.4 with hemorrhagic shock second to left perirenal and subcapsular hemorrhage and ultimately PEA arrest. ROSC achieved and transferred to MICU. s/p IR emobilization and course complicated anoxic brain injury, myoclonic jerks, GIB, MATA, HAGMA, DVTs (R IJ and LUE brachial), dysphagia, cirrhosis, and prolonged vent time s/p trach on 4/4. Transferred to RCU on 4/18.     NEUROLOGY  # Anoxic brain injury   - s/p cardiac arrest on 3/20 with ROSC in 4 minutes   - CT HEAD post arrest with diffuse sulcal effacement with increased intracranial pressure, and few patchy foci of cortical blurring concern for HIE  - MRI brain post arrest with with diffuse global abnormal supratentorial cortical restricted diffusion consistent with global hypoxic injury   - Monitor for now    # Seizures vs myoclonic jerks   - Witness myoclonic jerking concerning for seizures with anoxic brain injury  - vEEG with abundant myoclonic seizures in the setting of a severe diffuse/multifocal cerebral dysfunction which can be seen in the setting of sedative effect or due to anoxia  - s/p valium 10 Q4H   - Continue on keppra   - Continue on klonopin 2mg TID   - Continue on dilaudid PRN   - Started Bromocriptine for possibility of central etiology of ongoing fever (4/23)    HEENT  - Noted with tongue occlusive trauma s/p bite block replacement by OMFS (4/23)  - Mouth care   - Pending Dental eval for mouthguard placement 5/1 10AM    CARDIOLOGY  # Shock state likely hemorrhagic vs vasoplegia  - Acute renal bleed noted requiring multiple transfusions and pressors in ICU.   - TTE 3/22 with EF 63 with normal LVRVSF with PASP 49  - Weaned off pressors in ICU and complicated by hypertension.   - Started on norvasc 5, however dc'ed as BP improved on opioids   - Monitor BP   -resumed on norvasc 4/23    # Autonomic vs pain  - Mild HTN and tachypnea noted likely pain induced vs autonomia?   - Continue on dilaudid pushes PRN    PULMONARY  # Respiratory failure   - Presented with SCC and hypoxia likely from anemia with no signs of acute chest, however noted with PEA with hemorrhagic shock post renal bx requiring intubation.   - Prolonged intubation and s/p 7 PORTEX trach on 4/4   - Continue on vent 20/460/6/30   - BL LL atelectasis and continue on nebs with chest PT  - PIPs normal and holding IPV for now  - Continue on dilaudid PRN     GI  # Shock liver vs cirrhosis with sickle cell   - Transaminitis noted in ICU and thought to be second to shock liver likely second to hemorrhagic shock vs cardiac arrest, however LFTs remains elevated with acute on chronic hyperbilirubinemia.   - US ABD suggestive of early cirrhotic changes, cholecystectomy, and CBD 9mm   - CT AP with cirrhosis   - Trend LFTs and bili     # GIB  - Anemia with GIB noted in ICU   - s/p EGD 4/11 with cauterization of gastric ulcer & hemostatic spraying of three duodenal ulcers. Gastric ulcer likely due to NGT trauma.   - Continue on PPI BID   - Monitor stools and HH     # Oropharyngeal dysphagia   - GI unable to place PEG due to hepatomegaly.   - IR unable to place PEG second to no safe window   - s/p open G TUBE placement on 4/18   - Continue on TF    RENAL  # Acute renal failure and HAGMA second to shock state   - Scr on admission was 1.25 (3/15/25) and increased to 6s while in ICU and s/p HD 3/24-3/29 and then again on 4/4.   - L SHILE removed on 4/15   - CRE slowly improving but mild acidosis remains   - Continue on HCO3 1300 TID tabs for now   - Continue with daniel for now  - Monitor renal function, acidosis, and UOP   - TOV ongoing (4/23 - )    # L renal mass bx c/b hematoma   - Renal bx as below on 3/19  - Hemorrhagic shock and PEA arrest on 3/20   - Renal US with large L renal subcapsular hematoma.    - IR left renal angiogram and embolization on 3/20   - CT 4/6 with unchanged large left subcapsular and perinephric hematoma.  - CT A/P (4/20) showing stable subcapsular renal hematoma and NO new active bleeds.  - Monitor for now    # Hypernatremia  - s/p D5W   - Continue on  Q6H   - Monitor sodium     # Hyperphosphatemia   - Phos elevation likely second to renal failure   - PTH elevated, however Phos slowly down trending   - Monitor phos    INFECTIOUS DISEASE   UTI  - New fever with POSITIVE UA started on LVQ (4/23 - ) x5D  - BCx 4/23 NEG  - No UCX sent    # Fever   -(4/20_CXR with RLL infiltrate similar to prior, however slightly improved.   -(4/20)RVP negative   -(4/20) UA with pyuria   - PCT 0.5   - (4/20)  SCx and BCx -SC:x: commensal lay consistent with body site and few polymorphonecular leukocytes no squamous epithelial cells , few yeast like cells  .  -BCx NGT    # VAP   - Post arrest noted with concern for aspiration and VAP in ICU   - Flu, SCx, BAL, BCx and UCx negative   - MRSA PCR with MSSA and completed bactroban in ICU   - Completed aztreonam (3/22-3/27) then casye (3/27-3/31) and vanco by dose in ICU   - CXR in ICU with RUL consolidation and completed recurrent casey course in ICU (4/2-4/7).     HEMATOLOGIC  # Anemia with SCC vs L kidney bleed vs GIB   - Baseline hemoglobin outpatient ~6.0-7.0 and sent in for anemia down to 5.3 without signs of bleeding and more likely SCC.    - Transfusion given on admission and improved back to baseline.   - Course complicated by hemorrhagic shock from left renal hematoma and HH down to 3.4.  - s/p 10U PRBC total while in ICU   - s/p PRBC pre-GTUBE with HH increased to 8.0.   - CT/AP (4/20) Interval placement of gastrostomy tube without evidence of active GI   bleeding, Grossly stable left renal subcapsular hematoma without evidence of   active bleeding, Persistent small bilateral pleural effusions and airspace opacities.    - Monitor HH   -s/p 1 unit PRBC 4/22----hgb 5.9 -> 7.1    # SCD   - Discontinued deferasirox due to current renal failure   - Monitor Sickle labs QD (CBC with Diff, Retic, BMP, Hepatic Function, LDH and Hapto, and VBG).     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   - Heparin GTT held for G TUBE  - Given hemorrhagic shock history and dropping, DEFER FURTHER THERAPEUTIC ANTICOAGULATION   - Trialing on HSQ for DVT PPX (started 4/24)  - LUE precautions     # LUE arm infiltration   - HCO3 GTT infilitration in ICU   - Seen by hand sx and no compartment syndrome concern   - Monitor for now     # RUE blood infiltration   - RUE blood transfusion infiltration noted on 4/17 with area of ecchymosis   - RPT DOPPLER with known R IJ DVT, however no upper arm thrombus or issues in area of infiltration  - Monitor site for now    ENDOCRINE   # Glycemic control   - Monitor BG   - No hx of DM2     SKIN   - Wound care reccs appreciated    ETHICS   - FULL CODE     DISPO - TBD 45 YO M with PMHx of sickle cell disease with prior acute chest syndrome requiring transfusion and exchange in the past (last 12/2024), iron overload, gout, HTN, and RUE DVT in 12/2024 on eliquis who presented on 3/15 by his hematologist second to anemia and hypoxia, down to 5.3 from baseline 7-8, with concern for vaso-occlusive crisis. While on medicine, anemia improved post transfusion and continued on SCC pain control PCA. Patient noted with left renal mass during prior admission and s/p L ureteroscopy with biopsy and stent placement on 3/19. Course complicated with hypoglycemia on 3/20 with RRT x 2 and found with severe anemia to 3.4 with hemorrhagic shock second to left perirenal and subcapsular hemorrhage and ultimately PEA arrest. ROSC achieved and transferred to MICU. s/p IR emobilization and course complicated anoxic brain injury, myoclonic jerks, GIB, MATA, HAGMA, DVTs (R IJ and LUE brachial), dysphagia, cirrhosis, and prolonged vent time s/p trach on 4/4. Transferred to RCU on 4/18.     NEUROLOGY  # Anoxic brain injury   - s/p cardiac arrest on 3/20 with ROSC in 4 minutes   - CT HEAD post arrest with diffuse sulcal effacement with increased intracranial pressure, and few patchy foci of cortical blurring concern for HIE  - MRI brain post arrest with with diffuse global abnormal supratentorial cortical restricted diffusion consistent with global hypoxic injury   - Monitor for now    # Seizures vs myoclonic jerks   - Witness myoclonic jerking concerning for seizures with anoxic brain injury  - vEEG with abundant myoclonic seizures in the setting of a severe diffuse/multifocal cerebral dysfunction which can be seen in the setting of sedative effect or due to anoxia  - s/p valium 10 Q4H   - Continue on keppra   - Continue on klonopin 2mg TID   - Continue on dilaudid PRN   - Started Bromocriptine for possibility of central etiology of ongoing fever (4/23)    HEENT  - Noted with tongue occlusive trauma s/p bite block replacement by OMFS (4/23)  - Mouth care   - Pending Dental eval for mouthguard placement 5/1 10AM    CARDIOLOGY  # Shock state likely hemorrhagic vs vasoplegia  - Acute renal bleed noted requiring multiple transfusions and pressors in ICU.   - TTE 3/22 with EF 63 with normal LVRVSF with PASP 49  - Weaned off pressors in ICU and complicated by hypertension.   - Started on norvasc 5, however dc'ed as BP improved on opioids   - Monitor BP   -resumed on norvasc 4/23    # Autonomic vs pain  - Mild HTN and tachypnea noted likely pain induced vs autonomia?   - Continue on dilaudid pushes PRN    PULMONARY  # Respiratory failure   - Presented with SCC and hypoxia likely from anemia with no signs of acute chest, however noted with PEA with hemorrhagic shock post renal bx requiring intubation.   - Prolonged intubation and s/p 7 PORTEX trach on 4/4   - Continue on vent 20/460/6/30   - Abl to pressure support in am them back to AC for tachypnea   - BL LL atelectasis and continue on nebs with chest PT  - PIPs normal and holding IPV for now  - Continue on dilaudid PRN     GI  # Shock liver vs cirrhosis with sickle cell   - Transaminitis noted in ICU and thought to be second to shock liver likely second to hemorrhagic shock vs cardiac arrest, however LFTs remains elevated with acute on chronic hyperbilirubinemia.   - US ABD suggestive of early cirrhotic changes, cholecystectomy, and CBD 9mm   - CT AP with cirrhosis   - Trend LFTs and bili     # GIB  - Anemia with GIB noted in ICU   - s/p EGD 4/11 with cauterization of gastric ulcer & hemostatic spraying of three duodenal ulcers. Gastric ulcer likely due to NGT trauma.   - Continue on PPI BID   - Monitor stools and HH     # Oropharyngeal dysphagia   - GI unable to place PEG due to hepatomegaly.   - IR unable to place PEG second to no safe window   - s/p open G TUBE placement on 4/18   - Continue on TF    RENAL  # Acute renal failure and HAGMA second to shock state   - Scr on admission was 1.25 (3/15/25) and increased to 6s while in ICU and s/p HD 3/24-3/29 and then again on 4/4.   - L SHILE removed on 4/15   - CRE slowly improving but mild acidosis remains   - Continue on HCO3 1300 TID tabs for now   - Continue with daniel for now  - Monitor renal function, acidosis, and UOP   - TOV ongoing (4/23 - )    # L renal mass bx c/b hematoma   - Renal bx as below on 3/19  - Hemorrhagic shock and PEA arrest on 3/20   - Renal US with large L renal subcapsular hematoma.    - IR left renal angiogram and embolization on 3/20   - CT 4/6 with unchanged large left subcapsular and perinephric hematoma.  - CT A/P (4/20) showing stable subcapsular renal hematoma and NO new active bleeds.  - Monitor for now    # Hypernatremia  - s/p D5W   - Continue on  Q6H   - Monitor sodium     # Hyperphosphatemia   - Phos elevation likely second to renal failure   - PTH elevated, however Phos slowly down trending   - Monitor phos    INFECTIOUS DISEASE   UTI  - New fever with POSITIVE UA started on LVQ (4/23 - ) x5D  - BCx 4/23 NEG  - No UCX sent    # Fever   -(4/20_CXR with RLL infiltrate similar to prior, however slightly improved.   -(4/20)RVP negative   -(4/20) UA with pyuria   - PCT 0.5   - (4/20)  SCx and BCx -SC:x: commensal lay consistent with body site and few polymorphonecular leukocytes no squamous epithelial cells , few yeast like cells  .  -BCx NGT    # VAP   - Post arrest noted with concern for aspiration and VAP in ICU   - Flu, SCx, BAL, BCx and UCx negative   - MRSA PCR with MSSA and completed bactroban in ICU   - Completed aztreonam (3/22-3/27) then casey (3/27-3/31) and vanco by dose in ICU   - CXR in ICU with RUL consolidation and completed recurrent casey course in ICU (4/2-4/7).     HEMATOLOGIC  # Anemia with SCC vs L kidney bleed vs GIB   - Baseline hemoglobin outpatient ~6.0-7.0 and sent in for anemia down to 5.3 without signs of bleeding and more likely SCC.    - Transfusion given on admission and improved back to baseline.   - Course complicated by hemorrhagic shock from left renal hematoma and HH down to 3.4.  - s/p 10U PRBC total while in ICU   - s/p PRBC pre-GTUBE with HH increased to 8.0.   - CT/AP (4/20) Interval placement of gastrostomy tube without evidence of active GI   bleeding, Grossly stable left renal subcapsular hematoma without evidence of   active bleeding, Persistent small bilateral pleural effusions and airspace opacities.    - Monitor HH   -s/p 1 unit PRBC 4/22----hgb 5.9 -> 7.1    # SCD   - Discontinued deferasirox due to current renal failure   - Monitor Sickle labs QD (CBC with Diff, Retic, BMP, Hepatic Function, LDH and Hapto, and VBG).     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   - Heparin GTT held for G TUBE  - Given hemorrhagic shock history and dropping, DEFER FURTHER THERAPEUTIC ANTICOAGULATION   - Trialing on HSQ for DVT PPX (started 4/24)  - LUE precautions     # LUE arm infiltration   - HCO3 GTT infilitration in ICU   - Seen by hand sx and no compartment syndrome concern   - Monitor for now     # RUE blood infiltration   - RUE blood transfusion infiltration noted on 4/17 with area of ecchymosis   - RPT DOPPLER with known R IJ DVT, however no upper arm thrombus or issues in area of infiltration  - Monitor site for now    ENDOCRINE   # Glycemic control   - Monitor BG   - No hx of DM2     SKIN   - Wound care reccs appreciated    ETHICS   - FULL CODE     DISPO - TBD

## 2025-04-27 NOTE — PROGRESS NOTE ADULT - NS ATTEND AMEND GEN_ALL_CORE FT
44 year old male with a history of SSD, UE DVT, gout, HTN, with L renal mass admitted with acute anemia/ sickle cell crisis and evaluation of his left renal mass s/p left ureteroscopy and biopsy (3/19/25) with 24 hour post operative course complicated by rapid responses for hypoglycemia c/b cardiac arrest x 4 minutes CPR/1 epi with ROSC, transferred to MICU for further care     Post rosc found to be profoundly anemic, acidemic, with multiorgan dysfunction overall concerning for acute hemorrhagic shock.   Now with anoxia on CTH as well as MRI. Also with myclonus.  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     Patient after long GOC with family is now s/p trach and peg. H/H low but stable in setting of SSD. CT without source of bleeding. No longer requiring HD.     Having on and off fevers,. Possibly central fevers. Fevers improved with Levaquin and bromocriptine.   Tongue bitting s/p bite block. Awaiting mouth guard next week.     # Cardiac arrest  # Anoxic encephalopathy  # Myoclonus  # Anemia/SSD  # Transaminitis  # MATA 2/2 to ATN  # DVT  # Hyperbilirubinemia, Early cirrhosis?  # Urothelial Cancer.   # Fever  # Tongue bitting  - Cardiac arrest, short down time but with likely anoxic injury and multiorgan dysfunction in setting of hemorrhage, time of arrest Hg was 3 and hypoglycemic  - Repeat CT and MRI showing diffuse anoxia. Clinically doing poorly. Simulation myoclonus. C/W PO benzos as tolerated. PRN opoid.   - C/W vent, patient unlikely to be wean from the ventilator. PS as tolerated  - Transfuse PRN, s/p embolization by IR. Hematoma stable on CT scan. Not tolerating full a/c.   - DVT in the upper ext likely related with catheter.  - SSD, monitor hemolysis labs. SS labs  - MATA. s/p HD. Monitor urine output. Trend Cr.   - S/P courses of abx, on bromocryptine for possible central fevers with improvement in temperature trend though also started on abx at that time as well. Recent cultures negative thus far. Will hold bromocriptine and monitor as may be contributing to transaminase elevation.   - Hand lesions. Initially consulted vascular now vascular recommending plastics/hand--> no surgical interventions.   - Biopsy showing Non-invasive papillary urothelial carcinoma. Not a candidate for DMT per Onc.   - Tongue bitting s/p OMFS assistance in placing bite block. Dental consult--> Mouth Guard next week.   - DVT ppx- SCD for now.   - Dispo- full code. Overall prognosis is poor given severe anoxia and untreatable cancer

## 2025-04-28 LAB
ALBUMIN SERPL ELPH-MCNC: 2.7 G/DL — LOW (ref 3.3–5)
ALP SERPL-CCNC: 679 U/L — HIGH (ref 40–120)
ALT FLD-CCNC: 119 U/L — HIGH (ref 4–41)
ANION GAP SERPL CALC-SCNC: 12 MMOL/L — SIGNIFICANT CHANGE UP (ref 7–14)
AST SERPL-CCNC: 180 U/L — HIGH (ref 4–40)
BASOPHILS # BLD AUTO: 0.17 K/UL — SIGNIFICANT CHANGE UP (ref 0–0.2)
BASOPHILS NFR BLD AUTO: 1 % — SIGNIFICANT CHANGE UP (ref 0–2)
BILIRUB SERPL-MCNC: 2.9 MG/DL — HIGH (ref 0.2–1.2)
BUN SERPL-MCNC: 63 MG/DL — HIGH (ref 7–23)
CALCIUM SERPL-MCNC: 10 MG/DL — SIGNIFICANT CHANGE UP (ref 8.4–10.5)
CHLORIDE SERPL-SCNC: 113 MMOL/L — HIGH (ref 98–107)
CO2 SERPL-SCNC: 20 MMOL/L — LOW (ref 22–31)
CREAT SERPL-MCNC: 1.53 MG/DL — HIGH (ref 0.5–1.3)
CULTURE RESULTS: SIGNIFICANT CHANGE UP
CULTURE RESULTS: SIGNIFICANT CHANGE UP
EGFR: 57 ML/MIN/1.73M2 — LOW
EGFR: 57 ML/MIN/1.73M2 — LOW
EOSINOPHIL # BLD AUTO: 0.38 K/UL — SIGNIFICANT CHANGE UP (ref 0–0.5)
EOSINOPHIL NFR BLD AUTO: 2.3 % — SIGNIFICANT CHANGE UP (ref 0–6)
GLUCOSE BLDC GLUCOMTR-MCNC: 126 MG/DL — HIGH (ref 70–99)
GLUCOSE BLDC GLUCOMTR-MCNC: 136 MG/DL — HIGH (ref 70–99)
GLUCOSE SERPL-MCNC: 139 MG/DL — HIGH (ref 70–99)
HAPTOGLOB SERPL-MCNC: 51 MG/DL — SIGNIFICANT CHANGE UP (ref 34–200)
HCT VFR BLD CALC: 22.1 % — LOW (ref 39–50)
HGB BLD-MCNC: 7 G/DL — CRITICAL LOW (ref 13–17)
IANC: 13.44 K/UL — HIGH (ref 1.8–7.4)
IMM GRANULOCYTES NFR BLD AUTO: 0.9 % — SIGNIFICANT CHANGE UP (ref 0–0.9)
LDH SERPL L TO P-CCNC: 254 U/L — HIGH (ref 135–225)
LYMPHOCYTES # BLD AUTO: 1.36 K/UL — SIGNIFICANT CHANGE UP (ref 1–3.3)
LYMPHOCYTES # BLD AUTO: 8.3 % — LOW (ref 13–44)
MAGNESIUM SERPL-MCNC: 2.1 MG/DL — SIGNIFICANT CHANGE UP (ref 1.6–2.6)
MCHC RBC-ENTMCNC: 29.5 PG — SIGNIFICANT CHANGE UP (ref 27–34)
MCHC RBC-ENTMCNC: 31.7 G/DL — LOW (ref 32–36)
MCV RBC AUTO: 93.2 FL — SIGNIFICANT CHANGE UP (ref 80–100)
MONOCYTES # BLD AUTO: 0.96 K/UL — HIGH (ref 0–0.9)
MONOCYTES NFR BLD AUTO: 5.8 % — SIGNIFICANT CHANGE UP (ref 2–14)
NEUTROPHILS # BLD AUTO: 13.44 K/UL — HIGH (ref 1.8–7.4)
NEUTROPHILS NFR BLD AUTO: 81.7 % — HIGH (ref 43–77)
NRBC # BLD AUTO: 0 K/UL — SIGNIFICANT CHANGE UP (ref 0–0)
NRBC # FLD: 0 K/UL — SIGNIFICANT CHANGE UP (ref 0–0)
NRBC BLD AUTO-RTO: 0 /100 WBCS — SIGNIFICANT CHANGE UP (ref 0–0)
PHOSPHATE SERPL-MCNC: 3.1 MG/DL — SIGNIFICANT CHANGE UP (ref 2.5–4.5)
PLATELET # BLD AUTO: 357 K/UL — SIGNIFICANT CHANGE UP (ref 150–400)
POTASSIUM SERPL-MCNC: 4.6 MMOL/L — SIGNIFICANT CHANGE UP (ref 3.5–5.3)
POTASSIUM SERPL-SCNC: 4.6 MMOL/L — SIGNIFICANT CHANGE UP (ref 3.5–5.3)
PROT SERPL-MCNC: 7.4 G/DL — SIGNIFICANT CHANGE UP (ref 6–8.3)
RBC # BLD: 2.37 M/UL — LOW (ref 4.2–5.8)
RBC # BLD: 2.37 M/UL — LOW (ref 4.2–5.8)
RBC # FLD: 16.8 % — HIGH (ref 10.3–14.5)
RETICS #: 88.6 K/UL — SIGNIFICANT CHANGE UP (ref 25–125)
RETICS/RBC NFR: 3.7 % — HIGH (ref 0.5–2.5)
SODIUM SERPL-SCNC: 145 MMOL/L — SIGNIFICANT CHANGE UP (ref 135–145)
SPECIMEN SOURCE: SIGNIFICANT CHANGE UP
SPECIMEN SOURCE: SIGNIFICANT CHANGE UP
WBC # BLD: 16.45 K/UL — HIGH (ref 3.8–10.5)
WBC # FLD AUTO: 16.45 K/UL — HIGH (ref 3.8–10.5)

## 2025-04-28 PROCEDURE — 99233 SBSQ HOSP IP/OBS HIGH 50: CPT

## 2025-04-28 RX ORDER — ACETAMINOPHEN 500 MG/5ML
1000 LIQUID (ML) ORAL ONCE
Refills: 0 | Status: COMPLETED | OUTPATIENT
Start: 2025-04-28 | End: 2025-04-28

## 2025-04-28 RX ADMIN — LEVETIRACETAM 500 MILLIGRAM(S): 10 INJECTION, SOLUTION INTRAVENOUS at 05:01

## 2025-04-28 RX ADMIN — CLONAZEPAM 2 MILLIGRAM(S): 0.5 TABLET ORAL at 14:17

## 2025-04-28 RX ADMIN — HEPARIN SODIUM 5000 UNIT(S): 1000 INJECTION INTRAVENOUS; SUBCUTANEOUS at 17:00

## 2025-04-28 RX ADMIN — Medication 500000 UNIT(S): at 22:41

## 2025-04-28 RX ADMIN — IPRATROPIUM BROMIDE AND ALBUTEROL SULFATE 3 MILLILITER(S): .5; 2.5 SOLUTION RESPIRATORY (INHALATION) at 08:42

## 2025-04-28 RX ADMIN — Medication 1 APPLICATION(S): at 11:25

## 2025-04-28 RX ADMIN — LEVETIRACETAM 500 MILLIGRAM(S): 10 INJECTION, SOLUTION INTRAVENOUS at 17:00

## 2025-04-28 RX ADMIN — Medication 500000 UNIT(S): at 17:00

## 2025-04-28 RX ADMIN — Medication 40 MILLIGRAM(S): at 05:02

## 2025-04-28 RX ADMIN — Medication 1300 MILLIGRAM(S): at 14:17

## 2025-04-28 RX ADMIN — Medication 1000 MILLIGRAM(S): at 17:30

## 2025-04-28 RX ADMIN — IPRATROPIUM BROMIDE AND ALBUTEROL SULFATE 3 MILLILITER(S): .5; 2.5 SOLUTION RESPIRATORY (INHALATION) at 20:29

## 2025-04-28 RX ADMIN — IPRATROPIUM BROMIDE AND ALBUTEROL SULFATE 3 MILLILITER(S): .5; 2.5 SOLUTION RESPIRATORY (INHALATION) at 03:45

## 2025-04-28 RX ADMIN — Medication 15 MILLILITER(S): at 05:02

## 2025-04-28 RX ADMIN — Medication 40 MILLIGRAM(S): at 17:01

## 2025-04-28 RX ADMIN — Medication 1300 MILLIGRAM(S): at 22:37

## 2025-04-28 RX ADMIN — Medication 400 MILLIGRAM(S): at 16:29

## 2025-04-28 RX ADMIN — Medication 1300 MILLIGRAM(S): at 05:02

## 2025-04-28 RX ADMIN — IPRATROPIUM BROMIDE AND ALBUTEROL SULFATE 3 MILLILITER(S): .5; 2.5 SOLUTION RESPIRATORY (INHALATION) at 15:24

## 2025-04-28 RX ADMIN — CLONAZEPAM 2 MILLIGRAM(S): 0.5 TABLET ORAL at 22:37

## 2025-04-28 RX ADMIN — Medication 500 MILLILITER(S): at 19:35

## 2025-04-28 RX ADMIN — AMLODIPINE BESYLATE 5 MILLIGRAM(S): 10 TABLET ORAL at 05:02

## 2025-04-28 RX ADMIN — HEPARIN SODIUM 5000 UNIT(S): 1000 INJECTION INTRAVENOUS; SUBCUTANEOUS at 05:01

## 2025-04-28 RX ADMIN — Medication 0.5 MILLIGRAM(S): at 17:35

## 2025-04-28 RX ADMIN — CLONAZEPAM 2 MILLIGRAM(S): 0.5 TABLET ORAL at 05:02

## 2025-04-28 RX ADMIN — Medication 15 MILLILITER(S): at 17:00

## 2025-04-28 RX ADMIN — Medication 500000 UNIT(S): at 11:25

## 2025-04-28 NOTE — PROGRESS NOTE ADULT - ASSESSMENT
43 YO M with PMHx of sickle cell disease with prior acute chest syndrome requiring transfusion and exchange in the past (last 12/2024), iron overload, gout, HTN, and RUE DVT in 12/2024 on eliquis who presented on 3/15 by his hematologist second to anemia and hypoxia, down to 5.3 from baseline 7-8, with concern for vaso-occlusive crisis. While on medicine, anemia improved post transfusion and continued on SCC pain control PCA. Patient noted with left renal mass during prior admission and s/p L ureteroscopy with biopsy and stent placement on 3/19. Course complicated with hypoglycemia on 3/20 with RRT x 2 and found with severe anemia to 3.4 with hemorrhagic shock second to left perirenal and subcapsular hemorrhage and ultimately PEA arrest. ROSC achieved and transferred to MICU. s/p IR emobilization and course complicated anoxic brain injury, myoclonic jerks, GIB, MATA, HAGMA, DVTs (R IJ and LUE brachial), dysphagia, cirrhosis, and prolonged vent time s/p trach on 4/4. Transferred to [facility] on 4/18.     NEUROLOGY  # Anoxic brain injury   - s/p cardiac arrest on 3/20 with ROSC in 4 minutes   - CT HEAD post arrest with diffuse sulcal effacement with increased intracranial pressure, and few patchy foci of cortical blurring concern for HIE  - MRI brain post arrest with with diffuse global abnormal supratentorial cortical restricted diffusion consistent with global hypoxic injury   - Monitor for now    # Seizures vs myoclonic jerks   - Witness myoclonic jerking concerning for seizures with anoxic brain injury  - vEEG with abundant myoclonic seizures in the setting of a severe diffuse/multifocal cerebral dysfunction which can be seen in the setting of sedative effect or due to anoxia  - s/p valium 10 Q4H   - Continue on keppra   - Continue on klonopin 2mg TID   - Continue on dilaudid PRN   - Started Bromocriptine for possibility of central etiology of ongoing fever (4/23)    HEENT  - Noted with tongue occlusive trauma s/p bite block replacement by OMFS (4/23)  - Mouth care   - Pending Dental eval for mouthguard placement 5/1 10AM    CARDIOLOGY  # Shock state likely hemorrhagic vs vasoplegia  - Acute renal bleed noted requiring multiple transfusions and pressors in ICU.   - TTE 3/22 with EF 63 with normal LVRVSF with PASP 49  - Weaned off pressors in ICU and complicated by hypertension.   - Started on norvasc 5, however dc'ed as BP improved on opioids   - Monitor BP   -resumed on norvasc 4/23    # Autonomic vs pain  - Mild HTN and tachypnea noted likely pain induced vs autonomia?   - Continue on dilaudid pushes PRN    PULMONARY  # Respiratory failure   - Presented with SCC and hypoxia likely from anemia with no signs of acute chest, however noted with PEA with hemorrhagic shock post renal bx requiring intubation.   - Prolonged intubation and s/p 7 PORTEX trach on 4/4   - Continue on vent 20/460/6/30   - Abl to pressure support in am them back to AC for tachypnea   - BL LL atelectasis and continue on nebs with chest PT  - PIPs normal and holding IPV for now  - Continue on dilaudid PRN     GI  # Shock liver vs cirrhosis with sickle cell   - Transaminitis noted in ICU and thought to be second to shock liver likely second to hemorrhagic shock vs cardiac arrest, however LFTs remains elevated with acute on chronic hyperbilirubinemia.   - US ABD suggestive of early cirrhotic changes, cholecystectomy, and CBD 9mm   - CT AP with cirrhosis   - Trend LFTs and bili     # GIB  - Anemia with GIB noted in ICU   - s/p EGD 4/11 with cauterization of gastric ulcer & hemostatic spraying of three duodenal ulcers. Gastric ulcer likely due to NGT trauma.   - Continue on PPI BID   - Monitor stools and HH     # Oropharyngeal dysphagia   - GI unable to place PEG due to hepatomegaly.   - IR unable to place PEG second to no safe window   - s/p open G TUBE placement on 4/18   - Continue on TF    RENAL  # Acute renal failure and HAGMA second to shock state   - Scr on admission was 1.25 (3/15/25) and increased to 6s while in ICU and s/p HD 3/24-3/29 and then again on 4/4.   - L SHILE removed on 4/15   - CRE slowly improving but mild acidosis remains   - Continue on HCO3 1300 TID tabs for now   - Continue with daniel for now  - Monitor renal function, acidosis, and UOP   - TOV ongoing (4/23 - )    # L renal mass bx c/b hematoma   - Renal bx as below on 3/19  - Hemorrhagic shock and PEA arrest on 3/20   - Renal US with large L renal subcapsular hematoma.    - IR left renal angiogram and embolization on 3/20   - CT 4/6 with unchanged large left subcapsular and perinephric hematoma.  - CT A/P (4/20) showing stable subcapsular renal hematoma and NO new active bleeds.  - Monitor for now    # Hypernatremia  - s/p D5W   - Continue on  Q6H   - Monitor sodium     # Hyperphosphatemia   - Phos elevation likely second to renal failure   - PTH elevated, however Phos slowly down trending   - Monitor phos    INFECTIOUS DISEASE   UTI  - New fever with POSITIVE UA started on LVQ (4/23 - ) x5D  - BCx 4/23 NEG  - No UCX sent    # Fever   -(4/20_CXR with RLL infiltrate similar to prior, however slightly improved.   -(4/20)RVP negative   -(4/20) UA with pyuria   - PCT 0.5   - (4/20)  SCx and BCx -SC:x: commensal lay consistent with body site and few polymorphonecular leukocytes no squamous epithelial cells , few yeast like cells  .  -BCx NGT  - BROMOCRYPTINE started for suspected central fevers as well as rx for suspected -UTI broocryptine dc 2/2 rising LFT's follow temps   # VAP   - Post arrest noted with concern for aspiration and VAP in ICU   - Flu, SCx, BAL, BCx and UCx negative   - MRSA PCR with MSSA and completed bactroban in ICU   - Completed aztreonam (3/22-3/27) then casey (3/27-3/31) and vanco by dose in ICU   - CXR in ICU with RUL consolidation and completed recurrent casey course in ICU (4/2-4/7).     HEMATOLOGIC  # Anemia with SCC vs L kidney bleed vs GIB   - Baseline hemoglobin outpatient ~6.0-7.0 and sent in for anemia down to 5.3 without signs of bleeding and more likely SCC.    - Transfusion given on admission and improved back to baseline.   - Course complicated by hemorrhagic shock from left renal hematoma and HH down to 3.4.  - s/p 10U PRBC total while in ICU   - s/p PRBC pre-GTUBE with HH increased to 8.0.   - CT/AP (4/20) Interval placement of gastrostomy tube without evidence of active GI   bleeding, Grossly stable left renal subcapsular hematoma without evidence of   active bleeding, Persistent small bilateral pleural effusions and airspace opacities.    - Monitor HH   -s/p 1 unit PRBC 4/22----hgb 5.9 -> 7.1    # SCD   - Discontinued deferasirox due to current renal failure   - Monitor Sickle labs QD (CBC with Diff, Retic, BMP, Hepatic Function, LDH and Hapto, and VBG).     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   - Heparin GTT held for G TUBE  - Given hemorrhagic shock history and dropping, DEFER FURTHER THERAPEUTIC ANTICOAGULATION   - Trialing on HSQ for DVT PPX (started 4/24)  - LUE precautions     # LUE arm infiltration   - HCO3 GTT infilitration in ICU   - Seen by hand sx and no compartment syndrome concern   - Monitor for now     # RUE blood infiltration   - RUE blood transfusion infiltration noted on 4/17 with area of ecchymosis   - RPT DOPPLER with known R IJ DVT, however no upper arm thrombus or issues in area of infiltration  - Monitor site for now    ENDOCRINE   # Glycemic control   - Monitor BG   - No hx of DM2     SKIN   - Wound care reccs appreciated    ETHICS   - FULL CODE     DISPO - TBD 45 YO M with PMHx of sickle cell disease with prior acute chest syndrome requiring transfusion and exchange in the past (last 12/2024), iron overload, gout, HTN, and RUE DVT in 12/2024 on eliquis who presented on 3/15 by his hematologist second to anemia and hypoxia, down to 5.3 from baseline 7-8, with concern for vaso-occlusive crisis. While on medicine, anemia improved post transfusion and continued on SCC pain control PCA. Patient noted with left renal mass during prior admission and s/p L ureteroscopy with biopsy and stent placement on 3/19. Course complicated with hypoglycemia on 3/20 with RRT x 2 and found with severe anemia to 3.4 with hemorrhagic shock second to left perirenal and subcapsular hemorrhage and ultimately PEA arrest. ROSC achieved and transferred to MICU. s/p IR emobilization and course complicated anoxic brain injury, myoclonic jerks, GIB, MATA, HAGMA, DVTs (R IJ and LUE brachial), dysphagia, cirrhosis, and prolonged vent time s/p trach on 4/4. Transferred to [facility] on 4/18.     NEUROLOGY  # Anoxic brain injury   - s/p cardiac arrest on 3/20 with ROSC in 4 minutes   - CT HEAD post arrest with diffuse sulcal effacement with increased intracranial pressure, and few patchy foci of cortical blurring concern for HIE  - MRI brain post arrest with with diffuse global abnormal supratentorial cortical restricted diffusion consistent with global hypoxic injury   - Monitor for now    # Seizures vs myoclonic jerks   - Witness myoclonic jerking concerning for seizures with anoxic brain injury  - vEEG with abundant myoclonic seizures in the setting of a severe diffuse/multifocal cerebral dysfunction which can be seen in the setting of sedative effect or due to anoxia  - s/p valium 10 Q4H   - Continue on keppra   - Continue on klonopin 2mg TID   - Continue on dilaudid PRN   - Started Bromocriptine for possibility of central etiology of ongoing fever (4/23) was stopped on 4/27 now fevers are restarting     HEENT  - Noted with tongue occlusive trauma s/p bite block replacement by OMFS (4/23)  - Mouth care   - Pending Dental eval for mouthguard placement 5/1 10AM  - Per OMFS mouth guard out to rest jaw - soft gauze place to keep teeth off tongue   - + thrush and nystatin started     CARDIOLOGY  # Shock state likely hemorrhagic vs vasoplegia  - Acute renal bleed noted requiring multiple transfusions and pressors in ICU.   - TTE 3/22 with EF 63 with normal LVRVSF with PASP 49  - Weaned off pressors in ICU and complicated by hypertension.   - Started on norvasc 5, however dc'ed as BP improved on opioids   - Monitor BP   -resumed on norvasc 4/23    # Autonomic vs pain  - Mild HTN and tachypnea noted likely pain induced vs autonomia?   - Continue on dilaudid pushes PRN    PULMONARY  # Respiratory failure   - Presented with SCC and hypoxia likely from anemia with no signs of acute chest, however noted with PEA with hemorrhagic shock post renal bx requiring intubation.   - Prolonged intubation and s/p 7 PORTEX trach on 4/4   - Continue on vent 20/460/6/30   - Abl to pressure support in am them back to AC for tachypnea   - BL LL atelectasis and continue on nebs with chest PT  - PIPs normal and holding IPV for now  - Continue on dilaudid PRN     GI  # Shock liver vs cirrhosis with sickle cell   - Transaminitis noted in ICU and thought to be second to shock liver likely second to hemorrhagic shock vs cardiac arrest, however LFTs remains elevated with acute on chronic hyperbilirubinemia.   - US ABD suggestive of early cirrhotic changes, cholecystectomy, and CBD 9mm   - CT AP with cirrhosis   - Trend LFTs and bili   - Rising on bromocryptine and was dc on 4/27 with temp rising on 4/28 will culture if >100.4     # GIB  - Anemia with GIB noted in ICU   - s/p EGD 4/11 with cauterization of gastric ulcer & hemostatic spraying of three duodenal ulcers. Gastric ulcer likely due to NGT trauma.   - Continue on PPI BID   - Monitor stools and HH     # Oropharyngeal dysphagia   - GI unable to place PEG due to hepatomegaly.   - IR unable to place PEG second to no safe window   - s/p open G TUBE placement on 4/18   - Continue on TF    RENAL  # Acute renal failure and HAGMA second to shock state   - Scr on admission was 1.25 (3/15/25) and increased to 6s while in ICU and s/p HD 3/24-3/29 and then again on 4/4.   - L SHILE removed on 4/15   - CRE slowly improving but mild acidosis remains   - Continue on HCO3 1300 TID tabs for now   - Continue with daniel for now  - Monitor renal function, acidosis, and UOP   - TOV ongoing (4/23 - )    # L renal mass bx c/b hematoma   - Renal bx as below on 3/19  - Hemorrhagic shock and PEA arrest on 3/20   - Renal US with large L renal subcapsular hematoma.    - IR left renal angiogram and embolization on 3/20   - CT 4/6 with unchanged large left subcapsular and perinephric hematoma.  - CT A/P (4/20) showing stable subcapsular renal hematoma and NO new active bleeds.  - Monitor for now    # Hypernatremia  - s/p D5W   - Continue on  Q6H   - Monitor sodium     # Hyperphosphatemia   - Phos elevation likely second to renal failure   - PTH elevated, however Phos slowly down trending   - Monitor phos    INFECTIOUS DISEASE   UTI  - New fever with POSITIVE UA started on LVQ (4/23 - ) x5D  - BCx 4/23 NEG  - No UCX sent    # Fever   -(4/20_CXR with RLL infiltrate similar to prior, however slightly improved.   -(4/20)RVP negative   -(4/20) UA with pyuria   - PCT 0.5   - (4/20)  SCx and BCx -SC:x: commensal lay consistent with body site and few polymorphonecular leukocytes no squamous epithelial cells , few yeast like cells  .  -BCx NGT  - BROMOCRYPTINE started for suspected central fevers as well as rx for suspected -UTI broocryptine dc 2/2 rising LFT's follow temps   - New rising temp 4/28 100.2 off bromocryptine  Levaquin finishing tonignt   # VAP   - Post arrest noted with concern for aspiration and VAP in ICU   - Flu, SCx, BAL, BCx and UCx negative   - MRSA PCR with MSSA and completed bactroban in ICU   - Completed aztreonam (3/22-3/27) then casey (3/27-3/31) and vanco by dose in ICU   - CXR in ICU with RUL consolidation and completed recurrent casey course in ICU (4/2-4/7).     HEMATOLOGIC  # Anemia with SCC vs L kidney bleed vs GIB   - Baseline hemoglobin outpatient ~6.0-7.0 and sent in for anemia down to 5.3 without signs of bleeding and more likely SCC.    - Transfusion given on admission and improved back to baseline.   - Course complicated by hemorrhagic shock from left renal hematoma and HH down to 3.4.  - s/p 10U PRBC total while in ICU   - s/p PRBC pre-GTUBE with HH increased to 8.0.   - CT/AP (4/20) Interval placement of gastrostomy tube without evidence of active GI   bleeding, Grossly stable left renal subcapsular hematoma without evidence of   active bleeding, Persistent small bilateral pleural effusions and airspace opacities.    - Monitor HH   -s/p 1 unit PRBC 4/22----hgb 5.9 -> 7.1    # SCD   - Discontinued deferasirox due to current renal failure   - Monitor Sickle labs QD (CBC with Diff, Retic, BMP, Hepatic Function, LDH and Hapto, and VBG).     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   - Heparin GTT held for G TUBE  - Given hemorrhagic shock history and dropping, DEFER FURTHER THERAPEUTIC ANTICOAGULATION   - Trialing on HSQ for DVT PPX (started 4/24)  - LUE precautions     # LUE arm infiltration   - HCO3 GTT infilitration in ICU   - Seen by hand sx and no compartment syndrome concern   - Monitor for now     # RUE blood infiltration   - RUE blood transfusion infiltration noted on 4/17 with area of ecchymosis   - RPT DOPPLER with known R IJ DVT, however no upper arm thrombus or issues in area of infiltration  - Monitor site for now    ENDOCRINE   # Glycemic control   - Monitor BG   - No hx of DM2     SKIN   - Wound care reccs appreciated    ETHICS   - FULL CODE     DISPO - TBD

## 2025-04-28 NOTE — PROGRESS NOTE ADULT - SUBJECTIVE AND OBJECTIVE BOX
CHIEF COMPLAINT: Patient is a 44y old  Male who presents with a chief complaint of Referred by Hematologist for low Hg (27 Apr 2025 07:15)      INTERVAL EVENTS: No acute overnight events     ROS: Seen by bedside during AM rounds     OBJECTIVE:  ICU Vital Signs Last 24 Hrs  T(C): 37.1 (28 Apr 2025 04:00), Max: 37.6 (28 Apr 2025 00:00)  T(F): 98.7 (28 Apr 2025 04:00), Max: 99.7 (28 Apr 2025 00:00)  HR: 111 (28 Apr 2025 07:16) (110 - 123)  BP: 140/91 (28 Apr 2025 04:00) (131/84 - 151/93)  BP(mean): 105 (28 Apr 2025 04:00) (95 - 105)  ABP: --  ABP(mean): --  RR: 20 (28 Apr 2025 04:00) (20 - 21)  SpO2: 99% (28 Apr 2025 07:16) (93% - 100%)    O2 Parameters below as of 28 Apr 2025 04:00  Patient On (Oxygen Delivery Method): ventilator          Mode: AC/ CMV (Assist Control/ Continuous Mandatory Ventilation), RR (machine): 20, TV (machine): 460, FiO2: 40, PEEP: 6, ITime: 0.74, MAP: 11, PIP: 23    04-27 @ 07:01  -  04-28 @ 07:00  --------------------------------------------------------  IN: 650 mL / OUT: 1500 mL / NET: -850 mL      CAPILLARY BLOOD GLUCOSE      POCT Blood Glucose.: 136 mg/dL (28 Apr 2025 00:02)      PHYSICAL EXAM:  General: NAD   HEENT:(+) bite block in oral cavity.  Neck: (+) Trach tube to vent noted, site c/d/i.  Cards: S1/S2, no murmurs   Pulm: Clear bilaterally. No wheezes.   Abdomen: Soft, N TND. (+) G tube noted, site c/d/i. Midline laparotomy incision with overlying dressing.   Extremities: Anasarca with b/l UE and LE edema. Extremities warm to touch.  Neurology: Anoxic. Nonverbal. Not following commands or tracking. Not withdrawi    Mode: AC/ CMV (Assist Control/ Continuous Mandatory Ventilation)  RR (machine): 20  TV (machine): 460  FiO2: 40  PEEP: 6  ITime: 0.74  MAP: 11  PIP: 23      HOSPITAL MEDICATIONS:  MEDICATIONS  (STANDING):  albuterol/ipratropium for Nebulization 3 milliLiter(s) Nebulizer every 6 hours  amLODIPine   Tablet 5 milliGRAM(s) Oral daily  chlorhexidine 0.12% Liquid 15 milliLiter(s) Oral Mucosa two times a day  chlorhexidine 2% Cloths 1 Application(s) Topical daily  clonazePAM  Tablet 2 milliGRAM(s) Oral every 8 hours  heparin   Injectable 5000 Unit(s) SubCutaneous every 12 hours  influenza   Vaccine 0.5 milliLiter(s) IntraMuscular once  levETIRAcetam  Solution 500 milliGRAM(s) Oral two times a day  levoFLOXacin IVPB 750 milliGRAM(s) IV Intermittent every 24 hours  pantoprazole   Suspension 40 milliGRAM(s) Oral every 12 hours  sodium bicarbonate 1300 milliGRAM(s) Oral three times a day    MEDICATIONS  (PRN):  HYDROmorphone  Injectable 0.5 milliGRAM(s) IV Push every 4 hours PRN dysynchronny      LABS:                        7.0    16.45 )-----------( 357      ( 28 Apr 2025 05:30 )             22.1     04-28    145  |  113[H]  |  63[H]  ----------------------------<  139[H]  4.6   |  20[L]  |  1.53[H]    Ca    10.0      28 Apr 2025 05:30  Phos  3.1     04-28  Mg     2.10     04-28    TPro  7.4  /  Alb  2.7[L]  /  TBili  2.9[H]  /  DBili  x   /  AST  180[H]  /  ALT  119[H]  /  AlkPhos  679[H]  04-28      Urinalysis Basic - ( 28 Apr 2025 05:30 )    Color: x / Appearance: x / SG: x / pH: x  Gluc: 139 mg/dL / Ketone: x  / Bili: x / Urobili: x   Blood: x / Protein: x / Nitrite: x   Leuk Esterase: x / RBC: x / WBC x   Sq Epi: x / Non Sq Epi: x / Bacteria: x

## 2025-04-28 NOTE — CHART NOTE - NSCHARTNOTEFT_GEN_A_CORE
Called to see pt with HR elevated 140s - Sinus tach on CM RR 24  bp stable - Temp 100.2 RR 22   Pt seen lying SF in bed No resp. distress   HEENT MM moist + thrush + bite block   Cor S1S2 tachy   Chest - few bilat coarse bs no wheezing  Abd soft nt nd   Skin wm/dry  A/P Tachycardia /low grade temp   IV tylenol x1   IVF bolus 500cc x 1   Dilaudid x 1 dysautonomia

## 2025-04-28 NOTE — CHART NOTE - NSCHARTNOTEFT_GEN_A_CORE
[facility]   part of [facility]     Hospitalist Progress Note     Citlali Leung Patient Status:  Inpatient    1963 MRN [id number]   [facility] 7NE-A Attending Christian Mary MD   Hosp Day # 4 PCP Janel Hendrickson     Chief Complaint: Abdominal pain    Subjective:     Patient still having nausea, vomited this morning.  Pain is better controlled.  No cp, sob.  No other acute complaints.      Objective:    Review of Systems:   A comprehensive review of systems was completed; pertinent positive and negatives stated in subjective.    Vital signs:  Temp:  [98 °F (36.7 °C)-99.2 °F (37.3 °C)] 99 °F (37.2 °C)  Pulse:  [] 107  Resp:  [11-23] 16  BP: (122-229)/() 154/114  SpO2:  [90 %-95 %] 94 %    Physical Exam:    General: No acute distress, pleasant   Respiratory: Course BS  Cardiovascular: S1, S2, regular rate and rhythm  Abdomen: Soft, mild tenderness, non-distended, incision c/d/I, hypoactive BS  Neuro: No new focal deficits.   Extremities: No edema    Diagnostic Data:    Labs:  Recent Labs   Lab 24  0649 24  0328 24  0433 24  0526   WBC 16.0* 17.3* 14.9* 16.5*   HGB 12.8 11.1* 10.5* 10.7*   MCV 89.0 95.5 96.8 91.4   .0 245.0 239.0 260.0       Recent Labs   Lab 24  0649 24  1615 24  0328 24  0433 24  0526   *  --  112* 109* 109*   BUN 5*  --  10 9 7*   CREATSERUM 0.63  --  0.66 0.44* 0.54*   CA 9.2  --  10.1 9.9 9.7   ALB 2.8*  --  2.7* 2.6*  --      --  141 142 140   K 3.3*   < > 4.3  4.3 3.7 3.3*     --  113* 112 107   CO2 24.0  --  26.0 24.0 27.0   ALKPHO 72  --  71 84  --    AST 45*  --  47* 46*  --    ALT 24  --  24 24  --    BILT 0.5  --  0.5 0.6  --    TP 6.2*  --  6.3* 6.5  --     < > = values in this interval not displayed.       Estimated Creatinine Clearance: 86.5 mL/min (A) (based on SCr of 0.54 mg/dL (L)).    No results for input(s): \"TROP\", \"TROPHS\", \"CK\" in the last 168  NUTRITION FOLLOW UP NOTE     REASON FOR ASSESSMENT: SEVERE MALNUTRITION FOLLOW UP     SOURCE:    [x] Medical record [x] RN/PCA  [x]Patient inappropriate (disoriented, nonverbal) [x] Family/Caregiver     MEDICAL COURSE:  Per chart,"45 YO M with PMHx of sickle cell disease with prior acute chest syndrome requiring transfusion and exchange in the past (last 12/2024), iron overload, gout, HTN, and RUE DVT in 12/2024 on eliquis who presented on 3/15 by his hematologist second to anemia and hypoxia, down to 5.3 from baseline 7-8, with concern for vaso-occlusive crisis. While on medicine, anemia improved post transfusion and continued on SCC pain control PCA. Patient noted with left renal mass during prior admission and s/p L ureteroscopy with biopsy and stent placement on 3/19. Course complicated with hypoglycemia on 3/20 with RRT x 2 and found with severe anemia to 3.4 with hemorrhagic shock second to left perirenal and subcapsular hemorrhage and ultimately PEA arrest. ROSC achieved and transferred to MICU. s/p IR emobilization and course complicated anoxic brain injury, myoclonic jerks, GIB, MATA, HAGMA, DVTs (R IJ and LUE brachial), dysphagia, cirrhosis, and prolonged vent time s/p trach on 4/4. Transferred to RCU on 4/18."      DIET PRESCRIPTION:  Diet, NPO with Tube Feed:   Tube Feeding Modality: Gastrostomy  Nepro with Carb Steady (NEPRORTH)  Total Volume for 24 Hours (mL): 1170  Continuous  Starting Tube Feed Rate {mL per Hour}: 65  Until Goal Tube Feed Rate (mL per Hour): 65  Tube Feed Duration (in Hours): 18  Tube Feed Start Time: 11:00  Tube Feed Stop Time: 05:00 (04-22-25 @ 09:55)      NUTRITION COURSE:  Unable to conduct nutrition interview 2/2 decreased cognition. Information obtained from comprehensive chart review and per RN today: No reports of any recent episodes of nausea, vomiting, diarrhea or constipation, Last BM noted on 4/28 per RN flowsheets. Pt remains NPO TF only as sole source of nutrition and hydration;  Nepro @ 65ml/hr x18hrs; TF provides (77kg IBW); 1170ml total volume, 2106 kcal (27 kcal/kg), 95gm protein (1.2gm/kg), 854ml free water from formula. PEG placed on 4/18- no TF intolerances noted     Will recommend transition to bolus feeding.     Family present at bedside today, asked RD questions regarding TF, all questions answered. Pt's family confirmed understanding. RD to remain available.       PERTINENT MEDICATIONS:  MEDICATIONS  (STANDING):  albuterol/ipratropium for Nebulization 3 milliLiter(s) Nebulizer every 6 hours  amLODIPine   Tablet 5 milliGRAM(s) Oral daily  chlorhexidine 0.12% Liquid 15 milliLiter(s) Oral Mucosa two times a day  chlorhexidine 2% Cloths 1 Application(s) Topical daily  clonazePAM  Tablet 2 milliGRAM(s) Oral every 8 hours  heparin   Injectable 5000 Unit(s) SubCutaneous every 12 hours  influenza   Vaccine 0.5 milliLiter(s) IntraMuscular once  levETIRAcetam  Solution 500 milliGRAM(s) Oral two times a day  levoFLOXacin IVPB 750 milliGRAM(s) IV Intermittent every 24 hours  nystatin    Suspension 736015 Unit(s) Oral four times a day  pantoprazole   Suspension 40 milliGRAM(s) Oral every 12 hours  sodium bicarbonate 1300 milliGRAM(s) Oral three times a day    MEDICATIONS  (PRN):  HYDROmorphone  Injectable 0.5 milliGRAM(s) IV Push every 4 hours PRN dysynchronny    [x] Relevant notes on medications: none    PERTINENT LABS:  04-28 Na145 mmol/L Glu 139 mg/dL[H] K+ 4.6 mmol/L Cr  1.53 mg/dL[H] BUN 63 mg/dL[H] 04-28 Phos 3.1 mg/dL 04-28 Alb 2.7 g/dL[L] 04-04 Chol --    LDL --    HDL --    Trig 364 mg/dL[H]     A1C with Estimated Average Glucose Result: 5.5 % (04-22-25 @ 05:55)    POCT Blood Glucose.: 126 mg/dL (28 Apr 2025 11:22)  POCT Blood Glucose.: 136 mg/dL (28 Apr 2025 00:02)    [x] Relevant notes on labs: A1C wnr. Continues with elevated BUN/Cr, continue on therapeutic formula.     ANTHROPOMETRICS:  Height (cm): 175 (04-18 @ 08:20)  Weight (kg): 88.8 (04-18 @ 08:20),  BMI (kg/m2): 29 (04-18 @ 08:20)  Weight Assessment: No new weight to assess     PHYSICAL ASSESSMENT, per flowsheets:  Edema: generalized 1+   Pressure Injury: None    ESTIMATED NEEDS:  [X] No change since previous assessment, Based on Ideal Body Weight +10% 169.4lbs/ 77kg  0991-3330 kcal/d, 25-30 kcal/kg, 92-115gm protein/d, 1.2-1.5gm/kg    PREVIOUS NUTRITION DX: [ x] Malnutrition   Nutrition Diagnosis is [x] ongoing  [ ] resolved [ ] not applicable   New Nutrition Diagnosis: [x] not applicable     EDUCATION:  [ x] N/A 2/2 decreased cognition      RECOMMENDATIONS/INTERVENTIONS:  1) Recommend change TF to bolus feeding Nepro @ 195ml q4hr (6x/day) to provide same total volume (1170ml per day)   2) Continue to monitor TF tolerance  3) RD to f/u prn       MONITORING & EVALUATING:  Tolerance to diet/supplement, nutrition related lab values, weight trends, BMs/GI distress, hydration status, skin integrity.    Jeanine Massey, MS, RDN | Available on MS TEAMS | Office #[phone] hours.    No results for input(s): \"PTP\", \"INR\" in the last 168 hours.               Microbiology    Hospital Encounter on 05/10/24   1. Blood Culture     Status: None (Preliminary result)    Collection Time: 05/12/24  6:15 PM    Specimen: Blood,peripheral   Result Value Ref Range    Blood Culture Result No Growth 1 Day N/A         Imaging: Reviewed in Epic.    Medications:    HYDROmorphone  4 mg Oral q4h    potassium chloride  40 mEq Intravenous Once    potassium chloride  20 mEq Intravenous Once    acetaminophen  1,000 mg Oral q6h    piperacillin-tazobactam  3.375 g Intravenous Q8H ADELIA    enoxaparin  40 mg Subcutaneous Daily    famotidine  20 mg Oral BID    Or    famotidine  20 mg Intravenous BID    fluticasone furoate-vilanterol  1 puff Inhalation Daily    DULoxetine  60 mg Oral Daily    gabapentin  300 mg Oral TID    levothyroxine  175 mcg Oral Before breakfast    amLODIPine  5 mg Oral Daily    amLODIPine  2.5 mg Oral Nightly    losartan  50 mg Oral Daily    fentaNYL  1 patch Transdermal Q72H    fentaNYL  1 patch Transdermal Q72H       Assessment & Plan:      #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  #Leiomyosarcoma   Surgery on 5/10  Pain control, Anti-emetics  Soft diet, advance per surgery  Lovenox, IS, ambulate  CT abd pelv today  Post op care per surgery    #Sepsis due to Pneumonia  Fever, tachycardia, WBC 14.9 -> 16.5  CXR showed right middle lobe consolidation   Zosyn started  F/u blood cultures - NGTD    #Post op n/v  Continue prn compazine, reglan   CT abd/pelv today    #Postop urinary retention  Montor bladder scans, may need dennis insertion   U/a clear    #Essential HTN - amlodipine, losartan   #Hypothyroidism - Synthroid   #VANDA - Duloxetine   #Peripheral neuropathy - Gabapentin  #S/p Spine stimulator in place   #Chronic bronchitis - Breo, prn albuterol   #Hypokalemia - replete and trend, K 3.3  #Post op anemia:  hg 10.7, trend hg        Terrance Velazquez MD    Supplementary  Documentation:     Quality:  DVT Mechanical Prophylaxis:   SCDs, Early ambuation  DVT Pharmacologic Prophylaxis   Medication    enoxaparin (Lovenox) 40 MG/0.4ML SUBQ injection 40 mg                Code Status: Not on file  Aquino: No urinary catheter in place  Aquino Duration (in days): 2  Central line:    DAV:     Discharge is dependent on: surgical recovery   At this point Ms. Leung is expected to be discharge to: Home    The 21st Century Cures Act makes medical notes like these available to patients in the interest of transparency. Please be advised this is a medical document. Medical documents are intended to carry relevant information, facts as evident, and the clinical opinion of the practitioner. The medical note is intended as peer to peer communication and may appear blunt or direct. It is written in medical language and may contain abbreviations or verbiage that are unfamiliar.

## 2025-04-28 NOTE — PROGRESS NOTE ADULT - NS ATTEND AMEND GEN_ALL_CORE FT
agree with above  thus far remains afebrile  off bromocriptine, but on levaquin, to complete the course for pyuria  d/c planning in progress

## 2025-04-29 DIAGNOSIS — E87.0 HYPEROSMOLALITY AND HYPERNATREMIA: ICD-10-CM

## 2025-04-29 LAB
ALBUMIN SERPL ELPH-MCNC: 2.9 G/DL — LOW (ref 3.3–5)
ALP SERPL-CCNC: 694 U/L — HIGH (ref 40–120)
ALT FLD-CCNC: 121 U/L — HIGH (ref 4–41)
ANION GAP SERPL CALC-SCNC: 10 MMOL/L — SIGNIFICANT CHANGE UP (ref 7–14)
AST SERPL-CCNC: 157 U/L — HIGH (ref 4–40)
BASOPHILS # BLD AUTO: 0.19 K/UL — SIGNIFICANT CHANGE UP (ref 0–0.2)
BASOPHILS NFR BLD AUTO: 1 % — SIGNIFICANT CHANGE UP (ref 0–2)
BILIRUB SERPL-MCNC: 3 MG/DL — HIGH (ref 0.2–1.2)
BLOOD GAS VENOUS COMPREHENSIVE RESULT: SIGNIFICANT CHANGE UP
BUN SERPL-MCNC: 70 MG/DL — HIGH (ref 7–23)
CALCIUM SERPL-MCNC: 10.4 MG/DL — SIGNIFICANT CHANGE UP (ref 8.4–10.5)
CHLORIDE SERPL-SCNC: 115 MMOL/L — HIGH (ref 98–107)
CO2 SERPL-SCNC: 23 MMOL/L — SIGNIFICANT CHANGE UP (ref 22–31)
CREAT SERPL-MCNC: 1.61 MG/DL — HIGH (ref 0.5–1.3)
EGFR: 53 ML/MIN/1.73M2 — LOW
EGFR: 53 ML/MIN/1.73M2 — LOW
EOSINOPHIL # BLD AUTO: 0.14 K/UL — SIGNIFICANT CHANGE UP (ref 0–0.5)
EOSINOPHIL NFR BLD AUTO: 0.8 % — SIGNIFICANT CHANGE UP (ref 0–6)
GLUCOSE BLDC GLUCOMTR-MCNC: 133 MG/DL — HIGH (ref 70–99)
GLUCOSE SERPL-MCNC: 127 MG/DL — HIGH (ref 70–99)
HAPTOGLOB SERPL-MCNC: 49 MG/DL — SIGNIFICANT CHANGE UP (ref 34–200)
HCT VFR BLD CALC: 24.2 % — LOW (ref 39–50)
HGB BLD-MCNC: 7.7 G/DL — LOW (ref 13–17)
IANC: 14.14 K/UL — HIGH (ref 1.8–7.4)
IMM GRANULOCYTES NFR BLD AUTO: 0.8 % — SIGNIFICANT CHANGE UP (ref 0–0.9)
LDH SERPL L TO P-CCNC: 265 U/L — HIGH (ref 135–225)
LYMPHOCYTES # BLD AUTO: 14 % — SIGNIFICANT CHANGE UP (ref 13–44)
LYMPHOCYTES # BLD AUTO: 2.56 K/UL — SIGNIFICANT CHANGE UP (ref 1–3.3)
MAGNESIUM SERPL-MCNC: 2.4 MG/DL — SIGNIFICANT CHANGE UP (ref 1.6–2.6)
MCHC RBC-ENTMCNC: 29.7 PG — SIGNIFICANT CHANGE UP (ref 27–34)
MCHC RBC-ENTMCNC: 31.8 G/DL — LOW (ref 32–36)
MCV RBC AUTO: 93.4 FL — SIGNIFICANT CHANGE UP (ref 80–100)
MONOCYTES # BLD AUTO: 1.18 K/UL — HIGH (ref 0–0.9)
MONOCYTES NFR BLD AUTO: 6.4 % — SIGNIFICANT CHANGE UP (ref 2–14)
NEUTROPHILS # BLD AUTO: 14.14 K/UL — HIGH (ref 1.8–7.4)
NEUTROPHILS NFR BLD AUTO: 77 % — SIGNIFICANT CHANGE UP (ref 43–77)
NRBC # BLD AUTO: 0 K/UL — SIGNIFICANT CHANGE UP (ref 0–0)
NRBC # FLD: 0 K/UL — SIGNIFICANT CHANGE UP (ref 0–0)
NRBC BLD AUTO-RTO: 0 /100 WBCS — SIGNIFICANT CHANGE UP (ref 0–0)
PHOSPHATE SERPL-MCNC: 3.6 MG/DL — SIGNIFICANT CHANGE UP (ref 2.5–4.5)
PLATELET # BLD AUTO: 354 K/UL — SIGNIFICANT CHANGE UP (ref 150–400)
POTASSIUM SERPL-MCNC: 5.1 MMOL/L — SIGNIFICANT CHANGE UP (ref 3.5–5.3)
POTASSIUM SERPL-SCNC: 5.1 MMOL/L — SIGNIFICANT CHANGE UP (ref 3.5–5.3)
PROT SERPL-MCNC: 7.9 G/DL — SIGNIFICANT CHANGE UP (ref 6–8.3)
RBC # BLD: 2.59 M/UL — LOW (ref 4.2–5.8)
RBC # BLD: 2.59 M/UL — LOW (ref 4.2–5.8)
RBC # FLD: 17.2 % — HIGH (ref 10.3–14.5)
RETICS #: 56.7 K/UL — SIGNIFICANT CHANGE UP (ref 25–125)
RETICS/RBC NFR: 2.2 % — SIGNIFICANT CHANGE UP (ref 0.5–2.5)
SODIUM SERPL-SCNC: 148 MMOL/L — HIGH (ref 135–145)
WBC # BLD: 18.35 K/UL — HIGH (ref 3.8–10.5)
WBC # FLD AUTO: 18.35 K/UL — HIGH (ref 3.8–10.5)

## 2025-04-29 PROCEDURE — 99232 SBSQ HOSP IP/OBS MODERATE 35: CPT

## 2025-04-29 PROCEDURE — 99233 SBSQ HOSP IP/OBS HIGH 50: CPT

## 2025-04-29 RX ADMIN — Medication 500000 UNIT(S): at 23:37

## 2025-04-29 RX ADMIN — Medication 500000 UNIT(S): at 11:18

## 2025-04-29 RX ADMIN — CLONAZEPAM 2 MILLIGRAM(S): 0.5 TABLET ORAL at 13:02

## 2025-04-29 RX ADMIN — Medication 1300 MILLIGRAM(S): at 13:05

## 2025-04-29 RX ADMIN — Medication 15 MILLILITER(S): at 05:04

## 2025-04-29 RX ADMIN — LEVETIRACETAM 500 MILLIGRAM(S): 10 INJECTION, SOLUTION INTRAVENOUS at 05:03

## 2025-04-29 RX ADMIN — Medication 1 APPLICATION(S): at 11:18

## 2025-04-29 RX ADMIN — Medication 40 MILLIGRAM(S): at 05:03

## 2025-04-29 RX ADMIN — IPRATROPIUM BROMIDE AND ALBUTEROL SULFATE 3 MILLILITER(S): .5; 2.5 SOLUTION RESPIRATORY (INHALATION) at 09:11

## 2025-04-29 RX ADMIN — LEVETIRACETAM 500 MILLIGRAM(S): 10 INJECTION, SOLUTION INTRAVENOUS at 17:20

## 2025-04-29 RX ADMIN — IPRATROPIUM BROMIDE AND ALBUTEROL SULFATE 3 MILLILITER(S): .5; 2.5 SOLUTION RESPIRATORY (INHALATION) at 03:11

## 2025-04-29 RX ADMIN — IPRATROPIUM BROMIDE AND ALBUTEROL SULFATE 3 MILLILITER(S): .5; 2.5 SOLUTION RESPIRATORY (INHALATION) at 20:36

## 2025-04-29 RX ADMIN — Medication 500000 UNIT(S): at 05:04

## 2025-04-29 RX ADMIN — Medication 0.5 MILLIGRAM(S): at 05:02

## 2025-04-29 RX ADMIN — Medication 500000 UNIT(S): at 17:21

## 2025-04-29 RX ADMIN — Medication 15 MILLILITER(S): at 17:20

## 2025-04-29 RX ADMIN — Medication 40 MILLIGRAM(S): at 18:57

## 2025-04-29 RX ADMIN — CLONAZEPAM 2 MILLIGRAM(S): 0.5 TABLET ORAL at 05:02

## 2025-04-29 RX ADMIN — HEPARIN SODIUM 5000 UNIT(S): 1000 INJECTION INTRAVENOUS; SUBCUTANEOUS at 17:20

## 2025-04-29 RX ADMIN — Medication 1300 MILLIGRAM(S): at 21:09

## 2025-04-29 RX ADMIN — Medication 1300 MILLIGRAM(S): at 05:04

## 2025-04-29 RX ADMIN — CLONAZEPAM 2 MILLIGRAM(S): 0.5 TABLET ORAL at 21:19

## 2025-04-29 RX ADMIN — IPRATROPIUM BROMIDE AND ALBUTEROL SULFATE 3 MILLILITER(S): .5; 2.5 SOLUTION RESPIRATORY (INHALATION) at 15:40

## 2025-04-29 RX ADMIN — AMLODIPINE BESYLATE 5 MILLIGRAM(S): 10 TABLET ORAL at 05:04

## 2025-04-29 RX ADMIN — HEPARIN SODIUM 5000 UNIT(S): 1000 INJECTION INTRAVENOUS; SUBCUTANEOUS at 05:03

## 2025-04-29 NOTE — PROGRESS NOTE ADULT - SUBJECTIVE AND OBJECTIVE BOX
RCU PROGRESS NOTE     CHIEF COMPLAINT: Patient is a 44y old  Male who presents with a chief complaint of Referred by Hematologist for low Hg (2025 07:46)      INTERVAL EVENTS:    REVIEW OF SYSTEMS: Seen by bedside during AM rounds and     Mode: AC/ CMV (Assist Control/ Continuous Mandatory Ventilation), RR (machine): 20, TV (machine): 460, FiO2: 30, PEEP: 6, ITime: 0.72, MAP: 11, PC: 6, PIP: 23      OBJECTIVE:  ICU Vital Signs Last 24 Hrs  T(C): 37 (2025 05:25), Max: 37.9 (2025 16:10)  T(F): 98.6 (2025 05:25), Max: 100.2 (2025 16:10)  HR: 117 (2025 07:41) (106 - 146)  BP: 134/90 (2025 05:00) (134/90 - 148/93)  BP(mean): --  ABP: --  ABP(mean): --  RR: 20 (2025 04:00) (20 - 30)  SpO2: 100% (2025 07:41) (98% - 100%)    O2 Parameters below as of 2025 04:00  Patient On (Oxygen Delivery Method): ventilator          Mode: AC/ CMV (Assist Control/ Continuous Mandatory Ventilation), RR (machine): 20, TV (machine): 460, FiO2: 30, PEEP: 6, ITime: 0.72, MAP: 11, PC: 6, PIP: 23    - @ 07:01  -   @ 07:00  --------------------------------------------------------  IN: 500 mL / OUT: 2050 mL / NET: -1550 mL      CAPILLARY BLOOD GLUCOSE      POCT Blood Glucose.: 126 mg/dL (2025 11:22)      HOSPITAL MEDICATIONS:  MEDICATIONS  (STANDING):  albuterol/ipratropium for Nebulization 3 milliLiter(s) Nebulizer every 6 hours  amLODIPine   Tablet 5 milliGRAM(s) Oral daily  chlorhexidine 0.12% Liquid 15 milliLiter(s) Oral Mucosa two times a day  chlorhexidine 2% Cloths 1 Application(s) Topical daily  clonazePAM  Tablet 2 milliGRAM(s) Oral every 8 hours  heparin   Injectable 5000 Unit(s) SubCutaneous every 12 hours  influenza   Vaccine 0.5 milliLiter(s) IntraMuscular once  levETIRAcetam  Solution 500 milliGRAM(s) Oral two times a day  nystatin    Suspension 519988 Unit(s) Oral four times a day  pantoprazole   Suspension 40 milliGRAM(s) Oral every 12 hours  sodium bicarbonate 1300 milliGRAM(s) Oral three times a day    MEDICATIONS  (PRN):  HYDROmorphone  Injectable 0.5 milliGRAM(s) IV Push every 4 hours PRN dysynchronny      PHYSICAL EXAMINATION    LABS:                        7.0    16.45 )-----------( 357      ( 2025 05:30 )             22.1     -    145  |  113[H]  |  63[H]  ----------------------------<  139[H]  4.6   |  20[L]  |  1.53[H]    Ca    10.0      2025 05:30  Phos  3.1       Mg     2.10         TPro  7.4  /  Alb  2.7[L]  /  TBili  2.9[H]  /  DBili  x   /  AST  180[H]  /  ALT  119[H]  /  AlkPhos  679[H]        Urinalysis Basic - ( 2025 05:30 )    Color: x / Appearance: x / SG: x / pH: x  Gluc: 139 mg/dL / Ketone: x  / Bili: x / Urobili: x   Blood: x / Protein: x / Nitrite: x   Leuk Esterase: x / RBC: x / WBC x   Sq Epi: x / Non Sq Epi: x / Bacteria: x            PAST MEDICAL & SURGICAL HISTORY:  Sickle cell anemia      Gout      Deep vein thrombosis (DVT)      Iron overload      Hypertension      History of cholecystectomy      History of removal of Port-a-Cath          FAMILY HISTORY:  Family history of sickle cell trait (Father, Mother)    Patient's father is  (Father)    Patient's mother is  (Mother)        Social History:  Lives alone (15 Mar 2025 09:54)      RADIOLOGY:  [ ] Reviewed and interpreted by me    PULMONARY FUNCTION TESTS:    EKG: RCU PROGRESS NOTE     CHIEF COMPLAINT: Patient is a 44y old  Male who presents with a chief complaint of Referred by Hematologist for low Hg (2025 07:46)      INTERVAL EVENTS:  - Tiny fever spike yesterday   - No further temperature increases overnight   - Sodium trending up and FWF increased   - Pending dental mold on Friday     REVIEW OF SYSTEMS: Seen by bedside during AM rounds and unable to assess ROS second HIE    Mode: AC/ CMV (Assist Control/ Continuous Mandatory Ventilation), RR (machine): 20, TV (machine): 460, FiO2: 30, PEEP: 6, ITime: 0.72, MAP: 11, PC: 6, PIP: 23      OBJECTIVE:  ICU Vital Signs Last 24 Hrs  T(C): 37 (2025 05:25), Max: 37.9 (2025 16:10)  T(F): 98.6 (2025 05:25), Max: 100.2 (2025 16:10)  HR: 117 (2025 07:41) (106 - 146)  BP: 134/90 (2025 05:00) (134/90 - 148/93)  BP(mean): --  ABP: --  ABP(mean): --  RR: 20 (2025 04:00) (20 - 30)  SpO2: 100% (2025 07:41) (98% - 100%)    O2 Parameters below as of 2025 04:00  Patient On (Oxygen Delivery Method): ventilator          Mode: AC/ CMV (Assist Control/ Continuous Mandatory Ventilation), RR (machine): 20, TV (machine): 460, FiO2: 30, PEEP: 6, ITime: 0.72, MAP: 11, PC: 6, PIP: 23     @ 07:01  -   @ 07:00  --------------------------------------------------------  IN: 500 mL / OUT: 2050 mL / NET: -1550 mL      CAPILLARY BLOOD GLUCOSE  POCT Blood Glucose.: 126 mg/dL (2025 11:22)      HOSPITAL MEDICATIONS:  MEDICATIONS  (STANDING):  albuterol/ipratropium for Nebulization 3 milliLiter(s) Nebulizer every 6 hours  amLODIPine   Tablet 5 milliGRAM(s) Oral daily  chlorhexidine 0.12% Liquid 15 milliLiter(s) Oral Mucosa two times a day  chlorhexidine 2% Cloths 1 Application(s) Topical daily  clonazePAM  Tablet 2 milliGRAM(s) Oral every 8 hours  heparin   Injectable 5000 Unit(s) SubCutaneous every 12 hours  influenza   Vaccine 0.5 milliLiter(s) IntraMuscular once  levETIRAcetam  Solution 500 milliGRAM(s) Oral two times a day  nystatin    Suspension 217037 Unit(s) Oral four times a day  pantoprazole   Suspension 40 milliGRAM(s) Oral every 12 hours  sodium bicarbonate 1300 milliGRAM(s) Oral three times a day    MEDICATIONS  (PRN):  HYDROmorphone  Injectable 0.5 milliGRAM(s) IV Push every 4 hours PRN dysynchronny      PHYSICAL EXAMINATION  General: NAD   HEENT: Scleral icterus and trach present  Cards: S1/S2, no murmurs   Pulm: Course vent sounds bilaterally. No wheezes.   Abdomen: Soft, nondistended and nontender. PEG (+)   Extremities: No pedal edema. No active SABINO of BL upper and lower extremities.  Neurology: Eyes open, does not follow commands, only noted with bicep activation with noxious stimuli with no acute focal neurological deficits     LABS:                        7.0    16.45 )-----------( 357      ( 2025 05:30 )             22.1     -    145  |  113[H]  |  63[H]  ----------------------------<  139[H]  4.6   |  20[L]  |  1.53[H]    Ca    10.0      2025 05:30  Phos  3.1     -  Mg     2.10         TPro  7.4  /  Alb  2.7[L]  /  TBili  2.9[H]  /  DBili  x   /  AST  180[H]  /  ALT  119[H]  /  AlkPhos  679[H]        Urinalysis Basic - ( 2025 05:30 )    Color: x / Appearance: x / SG: x / pH: x  Gluc: 139 mg/dL / Ketone: x  / Bili: x / Urobili: x   Blood: x / Protein: x / Nitrite: x   Leuk Esterase: x / RBC: x / WBC x   Sq Epi: x / Non Sq Epi: x / Bacteria: x            PAST MEDICAL & SURGICAL HISTORY:  Sickle cell anemia      Gout      Deep vein thrombosis (DVT)      Iron overload      Hypertension      History of cholecystectomy      History of removal of Port-a-Cath          FAMILY HISTORY:  Family history of sickle cell trait (Father, Mother)    Patient's father is  (Father)    Patient's mother is  (Mother)        Social History:  Lives alone (15 Mar 2025 09:54)      RADIOLOGY:  [ ] Reviewed and interpreted by me    PULMONARY FUNCTION TESTS:    EKG:

## 2025-04-29 NOTE — PROGRESS NOTE ADULT - PROBLEM SELECTOR PLAN 1
Pt. with MATA in setting of hemorrhagic shock and PEA arrest. Pt. with most likely ATN. Scr on admission was 1.25 (3/15/25). Scr progressively worsened to 6.04 on 3/24/25. Renal US done on 3/20/25 showed large left renal subcapsular hematoma. Pt. received HD from 3/24/25 to 3/29/25 for oliguric kidney failure. Pt. restarted on HD 4/4/25 for metabolic acidosis and uremia. Last HD treatment was on 4/11/25. HD catheter was removed on 4/15/25. Labs from today reviewed. Scr elevated/stable at 1.61 today (4/29). Pt. non-oliguric with ~2L of UOP in last 24 hours. Recommend increase in free water flush, as outlined below. Monitor labs and UOP. Avoid nephrotoxins. Dose medications as per eGFR.

## 2025-04-29 NOTE — PROGRESS NOTE ADULT - SUBJECTIVE AND OBJECTIVE BOX
[facility] Division of Kidney Diseases & Hypertension  FOLLOW UP NOTE  [phone]--------------------------------------------------------------------------------    Chief Complaint: MATA    24 hour events/subjective: Pt. seen and examined with family present at bedside earlier this morning. Pt. remains on mechanical ventilator via trach collar. Unable to obtain ROS due to clinical status.     PAST HISTORY  --------------------------------------------------------------------------------  No significant changes to PMH, PSH, FHx, SHx, unless otherwise noted    ALLERGIES & MEDICATIONS  --------------------------------------------------------------------------------  Allergies    penicillin (Pruritus)  hydroxyurea (Other)  piperacillin-tazobactam (Urticaria)  ceftriaxone (Anaphylaxis)    Intolerances      Standing Inpatient Medications  albuterol/ipratropium for Nebulization 3 milliLiter(s) Nebulizer every 6 hours  amLODIPine   Tablet 5 milliGRAM(s) Oral daily  chlorhexidine 0.12% Liquid 15 milliLiter(s) Oral Mucosa two times a day  chlorhexidine 2% Cloths 1 Application(s) Topical daily  clonazePAM  Tablet 2 milliGRAM(s) Oral every 8 hours  heparin   Injectable 5000 Unit(s) SubCutaneous every 12 hours  influenza   Vaccine 0.5 milliLiter(s) IntraMuscular once  levETIRAcetam  Solution 500 milliGRAM(s) Oral two times a day  nystatin    Suspension 555112 Unit(s) Oral four times a day  pantoprazole   Suspension 40 milliGRAM(s) Oral every 12 hours  sodium bicarbonate 1300 milliGRAM(s) Oral three times a day    PRN Inpatient Medications  HYDROmorphone  Injectable 0.5 milliGRAM(s) IV Push every 4 hours PRN      REVIEW OF SYSTEMS  --------------------------------------------------------------------------------  Unable to obtain ROS, see HPI    VITALS/PHYSICAL EXAM  --------------------------------------------------------------------------------  T(C): 37 (04-29-25 @ 05:25), Max: 37.9 (04-28-25 @ 16:10)  HR: 117 (04-29-25 @ 11:13) (114 - 146)  BP: 134/90 (04-29-25 @ 05:00) (134/90 - 148/93)  RR: 20 (04-29-25 @ 04:00) (20 - 30)  SpO2: 100% (04-29-25 @ 11:13) (98% - 100%)  Wt(kg): --    04-28-25 @ 07:01  -  04-29-25 @ 07:00  --------------------------------------------------------  IN: 500 mL / OUT: 2050 mL / NET: -1550 mL    Physical Exam:  Gen: ill appearing  HEENT: tracheostomy, on mechanical ventilator  Pulm: +rhonchi B/L   CV: S1S2+  Abd: +BS, soft  : Jolie noted   Extremities: No LE edema noted BL  Neuro: Unresponsive to verbal stimuli  Skin: Warm    LABS/STUDIES  --------------------------------------------------------------------------------              7.7    18.35 >-----------<  354      [04-29-25 @ 08:42]              24.2     148  |  115  |  70  ----------------------------<  127      [04-29-25 @ 08:42]  5.1   |  23  |  1.61        Ca     10.4     [04-29-25 @ 08:42]      Mg     2.40     [04-29-25 @ 08:42]      Phos  3.6     [04-29-25 @ 08:42]    TPro  7.9  /  Alb  2.9  /  TBili  3.0  /  DBili  x   /  AST  157  /  ALT  121  /  AlkPhos  694  [04-29-25 @ 08:42]          [04-29-25 @ 08:42]    Creatinine Trend:  SCr 1.61 [04-29 @ 08:42]  SCr 1.53 [04-28 @ 05:30]  SCr 1.58 [04-27 @ 05:30]  SCr 1.59 [04-26 @ 06:10]  SCr 1.54 [04-25 @ 05:55]

## 2025-04-29 NOTE — PROGRESS NOTE ADULT - ASSESSMENT
43 YO M with PMHx of sickle cell disease with prior acute chest syndrome requiring transfusion and exchange in the past (last 12/2024), iron overload, gout, HTN, and RUE DVT in 12/2024 on eliquis who presented on 3/15 by his hematologist second to anemia and hypoxia, down to 5.3 from baseline 7-8, with concern for vaso-occlusive crisis. While on medicine, anemia improved post transfusion and continued on SCC pain control PCA. Patient noted with left renal mass during prior admission and s/p L ureteroscopy with biopsy and stent placement on 3/19. Course complicated with hypoglycemia on 3/20 with RRT x 2 and found with severe anemia to 3.4 with hemorrhagic shock second to left perirenal and subcapsular hemorrhage and ultimately PEA arrest. ROSC achieved and transferred to MICU. s/p IR emobilization and course complicated anoxic brain injury, myoclonic jerks, GIB, MATA, HAGMA, DVTs (R IJ and LUE brachial), dysphagia, cirrhosis, and prolonged vent time s/p trach on 4/4. Transferred to RCU on 4/18.     NEUROLOGY  # Anoxic brain injury   - s/p cardiac arrest on 3/20 with ROSC in 4 minutes   - CT HEAD post arrest with diffuse sulcal effacement with increased intracranial pressure, and few patchy foci of cortical blurring concern for HIE  - MRI brain post arrest with with diffuse global abnormal supratentorial cortical restricted diffusion consistent with global hypoxic injury   - Monitor for now    # Seizures vs myoclonic jerks   - Witness myoclonic jerking concerning for seizures with anoxic brain injury  - vEEG with abundant myoclonic seizures in the setting of a severe diffuse/multifocal cerebral dysfunction which can be seen in the setting of sedative effect or due to anoxia  - s/p valium 10 Q4H   - Continue on keppra   - Continue on klonopin 2mg TID   - Continue on dilaudid PRN     # Central fevers   - Persistent fever spikes noted with concern for central fevers   - Bromocriptine started on 4/23, however noted with rise in LFTs and now stopped on 4/27   - Monitor fever curve.     HEENT  # Tongue biting   - Noted with tongue trauma  - s/p bite block replacement by OMFS on 4/23  - Plan for dental mouthguard mold creation on 5/1   - Continue with mouth care     # Thrush   - Continue on nystatin (4/28 - )   - Continue with mouth care     CARDIOLOGY  # Shock state likely hemorrhagic vs vasoplegia  - Acute renal bleed noted requiring multiple transfusions and pressors in ICU.   - TTE 3/22 with EF 63 with normal LVRVSF with PASP 49  - Weaned off pressors in ICU and complicated by hypertension.   - Continue on norvasc 5 QD   - Monitor BP     # Autonomic vs pain  - Mild HTN and tachypnea noted likely pain induced vs autonomia?   - Continue on dilaudid pushes PRN    PULMONARY  # Respiratory failure   - Presented with SCC and hypoxia likely from anemia with no signs of acute chest, however noted with PEA with hemorrhagic shock post renal bx requiring intubation.   - Prolonged intubation and s/p 7 PORTEX trach on 4/4   - Continue on vent 20/460/6/30 with nebs and chest PT  - Continue on dilaudid PRN for dyssynchrony     GI  # Shock liver vs cirrhosis with sickle cell   - Transaminitis noted in ICU and thought to be second to shock liver likely second to hemorrhagic shock vs cardiac arrest, however LFTs remains elevated with acute on chronic hyperbilirubinemia.   - US ABD suggestive of early cirrhotic changes, cholecystectomy, and CBD 9mm   - CT AP with cirrhosis   - LFTs rising with bromocriptine   - Holding further bromocriptine   - Trend LFTs and bili     # GIB  - Anemia with GIB noted in ICU   - s/p EGD 4/11 with cauterization of gastric ulcer & hemostatic spraying of three duodenal ulcers. Gastric ulcer likely due to NGT trauma.   - Continue on PPI BID   - Monitor stools and HH     # Oropharyngeal dysphagia   - GI unable to place PEG due to hepatomegaly.   - IR unable to place PEG second to no safe window   - s/p open G TUBE placement on 4/18   - Continue on TF    RENAL  # Acute renal failure and HAGMA second to shock state   - Scr on admission was 1.25 (3/15/25) and increased to 6s while in ICU and s/p HD 3/24-3/29 and then again on 4/4.   - L SHILEY removed on 4/15   - Passed TOV on 4/23   - Continue on HCO3 1300 TID tabs with improved acidosis   - Monitor renal function, acidosis, and UOP     # L renal mass bx c/b hematoma   - Renal bx as below on 3/19  - Hemorrhagic shock and PEA arrest on 3/20   - Renal US with large L renal subcapsular hematoma.    - IR left renal angiogram and embolization on 3/20   - CT 4/6 with unchanged large left subcapsular and perinephric hematoma.  - CT A/P (4/20) showing stable subcapsular renal hematoma and NO new active bleeds.  - Monitor for now    # Hypernatremia  - s/p D5W   - Continue on  Q6H however sodium worsening   - Continue on  Q6H per renal  - Monitor sodium     # Hyperphosphatemia   - Phos elevation likely second to renal failure   - PTH elevated, however Phos slowly down trending   - Monitor phos    INFECTIOUS DISEASE   # Central fevers  - Recurrent fevers and complete LVQ empirically as below   - Started on bromocriptine with improvement in fever curve, however noted with transaminitis and bromocriptine stopped with return of elevated temperature with tmax 100.2F on 4/28.   - Hold work up for now   - Hold further ABX for now   - Monitor fever curve.     # Questionable UTI  - Recurrent fevers with POSITIVE UA and s/p LVQ (4/23 - 4/28)   - Monitor off ABX     # VAP   - Post arrest noted with concern for aspiration and VAP in ICU   - Flu, SCx, BAL, BCx and UCx negative   - MRSA PCR with MSSA and completed bactroban in ICU   - Completed aztreonam (3/22-3/27) then casey (3/27-3/31) and vanco by dose in ICU   - CXR in ICU with RUL consolidation and completed recurrent casey course in ICU (4/2-4/7).     HEMATOLOGIC  # Anemia with SCC vs L kidney bleed vs GIB   - Baseline hemoglobin outpatient ~6.0-7.0 and sent in for anemia down to 5.3 without signs of bleeding and more likely SCC.    - Transfusion given on admission and improved back to baseline.   - Course complicated by hemorrhagic shock from left renal hematoma and HH down to 3.4.  - s/p 10U PRBC total while in ICU   - s/p PRBC pre-GTUBE with HH increased to 8.0  - c/b rapid decline in HH post GTUBE likely normalizing to baseline 6-7 as RPT CT AP post G TUBE with new bleeds and no acute signs of sickling.   - s/p PRBC 4/22   - Monitor HH     # SCD   - Discontinued deferasirox due to current renal failure   - Monitor Sickle labs QD (CBC with Diff, Retic, BMP, Hepatic Function, LDH and Hapto, and VBG).     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   - Continue on DVT PPX HSQ (4/24 - )  - LUE precautions     # LUE arm infiltration   - HCO3 GTT infiltration in ICU   - Seen by hand sx and no compartment syndrome concern   - Monitor for now     # RUE blood infiltration   - RUE blood transfusion infiltration noted on 4/17 with area of ecchymosis   - RPT DOPPLER with known R IJ DVT, however no upper arm thrombus or issues in area of infiltration  - Monitor site for now    ENDOCRINE   # Glycemic control   - Monitor BG   - No hx of DM2     SKIN   - Wound care reccs appreciated    ETHICS   - FULL CODE     DISPO - vent facility if remains fever free

## 2025-04-29 NOTE — PROGRESS NOTE ADULT - PROBLEM SELECTOR PLAN 2
Pt. with hypernatremia in the setting fo impaired fluid intake. SNA slightly elevated at 148 today (4/29). Pt. currently receiving 250 cc q6h free water flushes, recommend to increase to 400 cc q6h. Monitor SNa.    If you have any questions, please feel free to contact me  Muna Hickey  Nephrology  [phone] / [employer] Teams(Preferred)  (After 4 pm or on weekends please page the on-call fellow) Pt. with hypernatremia in the setting fo impaired fluid intake. SNA slightly elevated at 148 today (4/29). Pt. currently receiving 250 cc q6h free water flushes, recommend to increase to 400 cc q6h. Can adjust rate pending clinical course. Monitor SNa.    SCr has been stable off of HD. Will discontinue follow up. Re-consult, as needed.    If you have any questions, please feel free to contact me  Muna Hickey  Nephrology  [phone] / [employer] Teams(Preferred)  (After 4 pm or on weekends please page the on-call fellow)

## 2025-04-29 NOTE — PROGRESS NOTE ADULT - NS ATTEND AMEND GEN_ALL_CORE FT
agree with above  completed levaquin  thus far afebrle  monitor wbc  planned for dental fitting friday

## 2025-04-29 NOTE — PROGRESS NOTE ADULT - PROBLEM SELECTOR PROBLEM 1
MATA (acute kidney injury)
MATA (acute kidney injury)
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
Anoxic brain injury
MATA (acute kidney injury)
MATA (acute kidney injury)
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
Anemia, sickle cell with crisis
MATA (acute kidney injury)
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
MATA (acute kidney injury)
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
MATA (acute kidney injury)
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
Anoxic brain injury
MATA (acute kidney injury)
Anoxic brain injury
Anemia, sickle cell with crisis
Anoxic brain injury

## 2025-04-29 NOTE — PROGRESS NOTE ADULT - PROBLEM SELECTOR PROBLEM 2
Volume overload
Deep vein thrombosis (DVT) of right upper extremity
Volume overload
Volume overload
Hypernatremia
Acute hypoxemic respiratory failure
Volume overload
Volume overload
Anemia, sickle cell with crisis
Anemia, sickle cell with crisis
Deep vein thrombosis (DVT) of right upper extremity
Deep vein thrombosis (DVT) of right upper extremity
Anemia, sickle cell with crisis
Anemia, sickle cell with crisis

## 2025-04-30 ENCOUNTER — RESULT REVIEW (OUTPATIENT)
Age: 45
End: 2025-04-30

## 2025-04-30 LAB
ALBUMIN SERPL ELPH-MCNC: 2.9 G/DL — LOW (ref 3.3–5)
ALP SERPL-CCNC: 777 U/L — HIGH (ref 40–120)
ALT FLD-CCNC: 151 U/L — HIGH (ref 4–41)
ANION GAP SERPL CALC-SCNC: 10 MMOL/L — SIGNIFICANT CHANGE UP (ref 7–14)
ANION GAP SERPL CALC-SCNC: 10 MMOL/L — SIGNIFICANT CHANGE UP (ref 7–14)
AST SERPL-CCNC: 204 U/L — HIGH (ref 4–40)
BASOPHILS # BLD AUTO: 0.2 K/UL — SIGNIFICANT CHANGE UP (ref 0–0.2)
BASOPHILS NFR BLD AUTO: 1 % — SIGNIFICANT CHANGE UP (ref 0–2)
BILIRUB SERPL-MCNC: 2.8 MG/DL — HIGH (ref 0.2–1.2)
BLOOD GAS VENOUS COMPREHENSIVE RESULT: SIGNIFICANT CHANGE UP
BUN SERPL-MCNC: 72 MG/DL — HIGH (ref 7–23)
BUN SERPL-MCNC: 76 MG/DL — HIGH (ref 7–23)
CALCIUM SERPL-MCNC: 10.3 MG/DL — SIGNIFICANT CHANGE UP (ref 8.4–10.5)
CALCIUM SERPL-MCNC: 10.4 MG/DL — SIGNIFICANT CHANGE UP (ref 8.4–10.5)
CHLORIDE SERPL-SCNC: 119 MMOL/L — HIGH (ref 98–107)
CHLORIDE SERPL-SCNC: 120 MMOL/L — HIGH (ref 98–107)
CO2 SERPL-SCNC: 22 MMOL/L — SIGNIFICANT CHANGE UP (ref 22–31)
CO2 SERPL-SCNC: 23 MMOL/L — SIGNIFICANT CHANGE UP (ref 22–31)
CREAT SERPL-MCNC: 1.63 MG/DL — HIGH (ref 0.5–1.3)
CREAT SERPL-MCNC: 1.64 MG/DL — HIGH (ref 0.5–1.3)
CULTURE RESULTS: ABNORMAL
EGFR: 52 ML/MIN/1.73M2 — LOW
EGFR: 52 ML/MIN/1.73M2 — LOW
EGFR: 53 ML/MIN/1.73M2 — LOW
EGFR: 53 ML/MIN/1.73M2 — LOW
EOSINOPHIL # BLD AUTO: 0.2 K/UL — SIGNIFICANT CHANGE UP (ref 0–0.5)
EOSINOPHIL NFR BLD AUTO: 1 % — SIGNIFICANT CHANGE UP (ref 0–6)
GLUCOSE BLDC GLUCOMTR-MCNC: 124 MG/DL — HIGH (ref 70–99)
GLUCOSE SERPL-MCNC: 142 MG/DL — HIGH (ref 70–99)
GLUCOSE SERPL-MCNC: 143 MG/DL — HIGH (ref 70–99)
HAPTOGLOB SERPL-MCNC: 49 MG/DL — SIGNIFICANT CHANGE UP (ref 34–200)
HCT VFR BLD CALC: 21.2 % — LOW (ref 39–50)
HGB BLD-MCNC: 6.8 G/DL — CRITICAL LOW (ref 13–17)
IANC: 15.74 K/UL — HIGH (ref 1.8–7.4)
IMM GRANULOCYTES NFR BLD AUTO: 0.9 % — SIGNIFICANT CHANGE UP (ref 0–0.9)
LDH SERPL L TO P-CCNC: 252 U/L — HIGH (ref 135–225)
LYMPHOCYTES # BLD AUTO: 10.6 % — LOW (ref 13–44)
LYMPHOCYTES # BLD AUTO: 2.1 K/UL — SIGNIFICANT CHANGE UP (ref 1–3.3)
MAGNESIUM SERPL-MCNC: 2.4 MG/DL — SIGNIFICANT CHANGE UP (ref 1.6–2.6)
MAGNESIUM SERPL-MCNC: 2.4 MG/DL — SIGNIFICANT CHANGE UP (ref 1.6–2.6)
MCHC RBC-ENTMCNC: 30.1 PG — SIGNIFICANT CHANGE UP (ref 27–34)
MCHC RBC-ENTMCNC: 32.1 G/DL — SIGNIFICANT CHANGE UP (ref 32–36)
MCV RBC AUTO: 93.8 FL — SIGNIFICANT CHANGE UP (ref 80–100)
MONOCYTES # BLD AUTO: 1.37 K/UL — HIGH (ref 0–0.9)
MONOCYTES NFR BLD AUTO: 6.9 % — SIGNIFICANT CHANGE UP (ref 2–14)
NEUTROPHILS # BLD AUTO: 15.74 K/UL — HIGH (ref 1.8–7.4)
NEUTROPHILS NFR BLD AUTO: 79.6 % — HIGH (ref 43–77)
NRBC # BLD AUTO: 0 K/UL — SIGNIFICANT CHANGE UP (ref 0–0)
NRBC # FLD: 0 K/UL — SIGNIFICANT CHANGE UP (ref 0–0)
NRBC BLD AUTO-RTO: 0 /100 WBCS — SIGNIFICANT CHANGE UP (ref 0–0)
PHOSPHATE SERPL-MCNC: 3.3 MG/DL — SIGNIFICANT CHANGE UP (ref 2.5–4.5)
PHOSPHATE SERPL-MCNC: 3.3 MG/DL — SIGNIFICANT CHANGE UP (ref 2.5–4.5)
PLATELET # BLD AUTO: 348 K/UL — SIGNIFICANT CHANGE UP (ref 150–400)
POTASSIUM SERPL-MCNC: 4.6 MMOL/L — SIGNIFICANT CHANGE UP (ref 3.5–5.3)
POTASSIUM SERPL-MCNC: 4.6 MMOL/L — SIGNIFICANT CHANGE UP (ref 3.5–5.3)
POTASSIUM SERPL-SCNC: 4.6 MMOL/L — SIGNIFICANT CHANGE UP (ref 3.5–5.3)
POTASSIUM SERPL-SCNC: 4.6 MMOL/L — SIGNIFICANT CHANGE UP (ref 3.5–5.3)
PROT SERPL-MCNC: 8.1 G/DL — SIGNIFICANT CHANGE UP (ref 6–8.3)
RBC # BLD: 2.26 M/UL — LOW (ref 4.2–5.8)
RBC # BLD: 2.26 M/UL — LOW (ref 4.2–5.8)
RBC # FLD: 17.3 % — HIGH (ref 10.3–14.5)
RETICS #: 47.9 K/UL — SIGNIFICANT CHANGE UP (ref 25–125)
RETICS/RBC NFR: 2.1 % — SIGNIFICANT CHANGE UP (ref 0.5–2.5)
SODIUM SERPL-SCNC: 152 MMOL/L — HIGH (ref 135–145)
SODIUM SERPL-SCNC: 152 MMOL/L — HIGH (ref 135–145)
SPECIMEN SOURCE: SIGNIFICANT CHANGE UP
WBC # BLD: 19.78 K/UL — HIGH (ref 3.8–10.5)
WBC # FLD AUTO: 19.78 K/UL — HIGH (ref 3.8–10.5)

## 2025-04-30 PROCEDURE — 99233 SBSQ HOSP IP/OBS HIGH 50: CPT

## 2025-04-30 PROCEDURE — 93970 EXTREMITY STUDY: CPT | Mod: 26

## 2025-04-30 RX ORDER — ACETAMINOPHEN 500 MG/5ML
650 LIQUID (ML) ORAL EVERY 6 HOURS
Refills: 0 | Status: DISCONTINUED | OUTPATIENT
Start: 2025-04-30 | End: 2025-05-16

## 2025-04-30 RX ORDER — SODIUM CHLORIDE 9 G/1000ML
1000 INJECTION, SOLUTION INTRAVENOUS
Refills: 0 | Status: DISCONTINUED | OUTPATIENT
Start: 2025-04-30 | End: 2025-05-01

## 2025-04-30 RX ADMIN — AMLODIPINE BESYLATE 5 MILLIGRAM(S): 10 TABLET ORAL at 05:03

## 2025-04-30 RX ADMIN — CLONAZEPAM 2 MILLIGRAM(S): 0.5 TABLET ORAL at 05:02

## 2025-04-30 RX ADMIN — Medication 500000 UNIT(S): at 05:02

## 2025-04-30 RX ADMIN — Medication 500000 UNIT(S): at 11:08

## 2025-04-30 RX ADMIN — Medication 500000 UNIT(S): at 22:24

## 2025-04-30 RX ADMIN — LEVETIRACETAM 500 MILLIGRAM(S): 10 INJECTION, SOLUTION INTRAVENOUS at 05:02

## 2025-04-30 RX ADMIN — IPRATROPIUM BROMIDE AND ALBUTEROL SULFATE 3 MILLILITER(S): .5; 2.5 SOLUTION RESPIRATORY (INHALATION) at 03:23

## 2025-04-30 RX ADMIN — Medication 1300 MILLIGRAM(S): at 22:24

## 2025-04-30 RX ADMIN — HEPARIN SODIUM 5000 UNIT(S): 1000 INJECTION INTRAVENOUS; SUBCUTANEOUS at 17:04

## 2025-04-30 RX ADMIN — Medication 15 MILLILITER(S): at 17:04

## 2025-04-30 RX ADMIN — IPRATROPIUM BROMIDE AND ALBUTEROL SULFATE 3 MILLILITER(S): .5; 2.5 SOLUTION RESPIRATORY (INHALATION) at 23:16

## 2025-04-30 RX ADMIN — Medication 1300 MILLIGRAM(S): at 13:09

## 2025-04-30 RX ADMIN — IPRATROPIUM BROMIDE AND ALBUTEROL SULFATE 3 MILLILITER(S): .5; 2.5 SOLUTION RESPIRATORY (INHALATION) at 09:33

## 2025-04-30 RX ADMIN — Medication 40 MILLIGRAM(S): at 17:05

## 2025-04-30 RX ADMIN — Medication 1300 MILLIGRAM(S): at 05:01

## 2025-04-30 RX ADMIN — Medication 1 APPLICATION(S): at 11:07

## 2025-04-30 RX ADMIN — Medication 40 MILLIGRAM(S): at 05:02

## 2025-04-30 RX ADMIN — LEVETIRACETAM 500 MILLIGRAM(S): 10 INJECTION, SOLUTION INTRAVENOUS at 17:03

## 2025-04-30 RX ADMIN — IPRATROPIUM BROMIDE AND ALBUTEROL SULFATE 3 MILLILITER(S): .5; 2.5 SOLUTION RESPIRATORY (INHALATION) at 16:47

## 2025-04-30 RX ADMIN — Medication 650 MILLIGRAM(S): at 07:28

## 2025-04-30 RX ADMIN — CLONAZEPAM 2 MILLIGRAM(S): 0.5 TABLET ORAL at 13:08

## 2025-04-30 RX ADMIN — Medication 15 MILLILITER(S): at 05:04

## 2025-04-30 RX ADMIN — Medication 500000 UNIT(S): at 17:03

## 2025-04-30 RX ADMIN — HEPARIN SODIUM 5000 UNIT(S): 1000 INJECTION INTRAVENOUS; SUBCUTANEOUS at 05:02

## 2025-04-30 RX ADMIN — CLONAZEPAM 2 MILLIGRAM(S): 0.5 TABLET ORAL at 22:24

## 2025-04-30 NOTE — PROGRESS NOTE ADULT - SUBJECTIVE AND OBJECTIVE BOX
RCU PROGRESS NOTE     CHIEF COMPLAINT: Patient is a 44y old  Male who presents with a chief complaint of Referred by Hematologist for low Hg (2025 07:46)      INTERVAL EVENTS:  - TMAX 100.5F overnight  - Pending dental mold on Friday     REVIEW OF SYSTEMS: Seen by bedside during AM rounds and unable to assess ROS second HIE    Mode: AC/ CMV (Assist Control/ Continuous Mandatory Ventilation), RR (machine): 20, TV (machine): 460, FiO2: 30, PEEP: 6, ITime: 0.72, MAP: 11, PC: 6, PIP: 23      OBJECTIVE:  ICU Vital Signs Last 24 Hrs  T(C): 37 (2025 05:25), Max: 37.9 (2025 16:10)  T(F): 98.6 (2025 05:25), Max: 100.2 (2025 16:10)  HR: 117 (2025 07:41) (106 - 146)  BP: 134/90 (2025 05:00) (134/90 - 148/93)  BP(mean): --  ABP: --  ABP(mean): --  RR: 20 (2025 04:00) (20 - 30)  SpO2: 100% (2025 07:41) (98% - 100%)    O2 Parameters below as of 2025 04:00  Patient On (Oxygen Delivery Method): ventilator          Mode: AC/ CMV (Assist Control/ Continuous Mandatory Ventilation), RR (machine): 20, TV (machine): 460, FiO2: 30, PEEP: 6, ITime: 0.72, MAP: 11, PC: 6, PIP: 23     @ 07:01  -   @ 07:00  --------------------------------------------------------  IN: 500 mL / OUT: 2050 mL / NET: -1550 mL      CAPILLARY BLOOD GLUCOSE  POCT Blood Glucose.: 126 mg/dL (2025 11:22)      HOSPITAL MEDICATIONS:  MEDICATIONS  (STANDING):  albuterol/ipratropium for Nebulization 3 milliLiter(s) Nebulizer every 6 hours  amLODIPine   Tablet 5 milliGRAM(s) Oral daily  chlorhexidine 0.12% Liquid 15 milliLiter(s) Oral Mucosa two times a day  chlorhexidine 2% Cloths 1 Application(s) Topical daily  clonazePAM  Tablet 2 milliGRAM(s) Oral every 8 hours  heparin   Injectable 5000 Unit(s) SubCutaneous every 12 hours  influenza   Vaccine 0.5 milliLiter(s) IntraMuscular once  levETIRAcetam  Solution 500 milliGRAM(s) Oral two times a day  nystatin    Suspension 322683 Unit(s) Oral four times a day  pantoprazole   Suspension 40 milliGRAM(s) Oral every 12 hours  sodium bicarbonate 1300 milliGRAM(s) Oral three times a day    MEDICATIONS  (PRN):  HYDROmorphone  Injectable 0.5 milliGRAM(s) IV Push every 4 hours PRN dysynchronny      PHYSICAL EXAMINATION  General: NAD   HEENT: Scleral icterus and trach present  Cards: S1/S2, no murmurs   Pulm: Course vent sounds bilaterally. No wheezes.   Abdomen: Soft, nondistended and nontender. PEG (+)   Extremities: No pedal edema. No active SABINO of BL upper and lower extremities.  Neurology: Eyes open, does not follow commands, only noted with bicep activation with noxious stimuli with no acute focal neurological deficits     LABS:                        Urinalysis Basic - ( 2025 05:30 )  Color: x / Appearance: x / SG: x / pH: x  Gluc: 139 mg/dL / Ketone: x  / Bili: x / Urobili: x   Blood: x / Protein: x / Nitrite: x   Leuk Esterase: x / RBC: x / WBC x   Sq Epi: x / Non Sq Epi: x / Bacteria: x            PAST MEDICAL & SURGICAL HISTORY:  Sickle cell anemia      Gout      Deep vein thrombosis (DVT)      Iron overload      Hypertension      History of cholecystectomy      History of removal of Port-a-Cath          FAMILY HISTORY:  Family history of sickle cell trait (Father, Mother)    Patient's father is  (Father)    Patient's mother is  (Mother)        Social History:  Lives alone (15 Mar 2025 09:54)      RADIOLOGY:  [ ] Reviewed and interpreted by me    PULMONARY FUNCTION TESTS:    EKG: RCU PROGRESS NOTE     CHIEF COMPLAINT: Patient is a 44y old  Male who presents with a chief complaint of Referred by Hematologist for low Hg (2025 07:46)      INTERVAL EVENTS:  - TMAX 100.5F overnight likely second to known R IJ DVT vs left kidney hematoma.   - Sodium worsening despite increased free water and D5 added.   - Pending dental mold on Friday.     REVIEW OF SYSTEMS: Seen by bedside during AM rounds and unable to assess ROS second HIE    Mode: AC/ CMV (Assist Control/ Continuous Mandatory Ventilation), RR (machine): 20, TV (machine): 460, FiO2: 30, PEEP: 6, ITime: 0.72, MAP: 11, PC: 6, PIP: 23      OBJECTIVE:  ICU Vital Signs Last 24 Hrs  T(C): 37 (2025 05:25), Max: 37.9 (2025 16:10)  T(F): 98.6 (2025 05:25), Max: 100.2 (2025 16:10)  HR: 117 (2025 07:41) (106 - 146)  BP: 134/90 (2025 05:00) (134/90 - 148/93)  BP(mean): --  ABP: --  ABP(mean): --  RR: 20 (2025 04:00) (20 - 30)  SpO2: 100% (2025 07:41) (98% - 100%)    O2 Parameters below as of 2025 04:00  Patient On (Oxygen Delivery Method): ventilator          Mode: AC/ CMV (Assist Control/ Continuous Mandatory Ventilation), RR (machine): 20, TV (machine): 460, FiO2: 30, PEEP: 6, ITime: 0.72, MAP: 11, PC: 6, PIP: 23    - @ 07:01  -  - @ 07:00  --------------------------------------------------------  IN: 500 mL / OUT: 2050 mL / NET: -1550 mL      CAPILLARY BLOOD GLUCOSE  POCT Blood Glucose.: 126 mg/dL (2025 11:22)      HOSPITAL MEDICATIONS:  MEDICATIONS  (STANDING):  albuterol/ipratropium for Nebulization 3 milliLiter(s) Nebulizer every 6 hours  amLODIPine   Tablet 5 milliGRAM(s) Oral daily  chlorhexidine 0.12% Liquid 15 milliLiter(s) Oral Mucosa two times a day  chlorhexidine 2% Cloths 1 Application(s) Topical daily  clonazePAM  Tablet 2 milliGRAM(s) Oral every 8 hours  heparin   Injectable 5000 Unit(s) SubCutaneous every 12 hours  influenza   Vaccine 0.5 milliLiter(s) IntraMuscular once  levETIRAcetam  Solution 500 milliGRAM(s) Oral two times a day  nystatin    Suspension 446699 Unit(s) Oral four times a day  pantoprazole   Suspension 40 milliGRAM(s) Oral every 12 hours  sodium bicarbonate 1300 milliGRAM(s) Oral three times a day    MEDICATIONS  (PRN):  HYDROmorphone  Injectable 0.5 milliGRAM(s) IV Push every 4 hours PRN dysynchronny      PHYSICAL EXAMINATION  General: NAD   HEENT: Scleral icterus and trach present  Cards: S1/S2, no murmurs   Pulm: Course vent sounds bilaterally. No wheezes.   Abdomen: Soft, nondistended and nontender. PEG (+)   Extremities: No pedal edema. No active SABINO of BL upper and lower extremities.  Neurology: Eyes open, does not follow commands, only noted with bicep activation with noxious stimuli with no acute focal neurological deficits     LABS:                                6.8    19.78 )-----------( 348      ( 2025 06:00 )             21.2         04-30    152[H]  |  119[H]  |  72[H]  ----------------------------<  142[H]  4.6   |  23  |  1.63[H]    Ca    10.3      2025 06:00  Phos  3.3     04-30  Mg     2.40     04-30    TPro  8.1  /  Alb  2.9[L]  /  TBili  2.8[H]  /  DBili  x   /  AST  204[H]  /  ALT  151[H]  /  AlkPhos  777[H]  04-30                    Urinalysis Basic - ( 2025 05:30 )  Color: x / Appearance: x / SG: x / pH: x  Gluc: 139 mg/dL / Ketone: x  / Bili: x / Urobili: x   Blood: x / Protein: x / Nitrite: x   Leuk Esterase: x / RBC: x / WBC x   Sq Epi: x / Non Sq Epi: x / Bacteria: x            PAST MEDICAL & SURGICAL HISTORY:  Sickle cell anemia      Gout      Deep vein thrombosis (DVT)      Iron overload      Hypertension      History of cholecystectomy      History of removal of Port-a-Cath          FAMILY HISTORY:  Family history of sickle cell trait (Father, Mother)    Patient's father is  (Father)    Patient's mother is  (Mother)        Social History:  Lives alone (15 Mar 2025 09:54)      RADIOLOGY:  [ ] Reviewed and interpreted by me    PULMONARY FUNCTION TESTS:    EKG:

## 2025-04-30 NOTE — PROGRESS NOTE ADULT - ASSESSMENT
43 YO M with PMHx of sickle cell disease with prior acute chest syndrome requiring transfusion and exchange in the past (last 12/2024), iron overload, gout, HTN, and RUE DVT in 12/2024 on eliquis who presented on 3/15 by his hematologist second to anemia and hypoxia, down to 5.3 from baseline 7-8, with concern for vaso-occlusive crisis. While on medicine, anemia improved post transfusion and continued on SCC pain control PCA. Patient noted with left renal mass during prior admission and s/p L ureteroscopy with biopsy and stent placement on 3/19. Course complicated with hypoglycemia on 3/20 with RRT x 2 and found with severe anemia to 3.4 with hemorrhagic shock second to left perirenal and subcapsular hemorrhage and ultimately PEA arrest. ROSC achieved and transferred to MICU. s/p IR emobilization and course complicated anoxic brain injury, myoclonic jerks, GIB, MATA, HAGMA, DVTs (R IJ and LUE brachial), dysphagia, cirrhosis, and prolonged vent time s/p trach on 4/4. Transferred to [facility] on 4/18.     NEUROLOGY  # Anoxic brain injury   - s/p cardiac arrest on 3/20 with ROSC in 4 minutes   - CT HEAD post arrest with diffuse sulcal effacement with increased intracranial pressure, and few patchy foci of cortical blurring concern for HIE  - MRI brain post arrest with with diffuse global abnormal supratentorial cortical restricted diffusion consistent with global hypoxic injury   - Monitor for now    # Seizures vs myoclonic jerks   - Witness myoclonic jerking concerning for seizures with anoxic brain injury  - vEEG with abundant myoclonic seizures in the setting of a severe diffuse/multifocal cerebral dysfunction which can be seen in the setting of sedative effect or due to anoxia  - s/p valium 10 Q4H   - Continue on keppra   - Continue on klonopin 2mg TID   - Continue on dilaudid PRN     # Central fevers   - Persistent fever spikes noted with concern for central fevers   - Bromocriptine started on 4/23, however noted with rise in LFTs and now stopped on 4/27   - Monitor fever curve.     HEENT  # Tongue biting   - Noted with tongue trauma  - s/p bite block replacement by OMFS on 4/23  - Plan for dental mouthguard mold creation on 5/1   - Continue with mouth care     # Thrush   - Continue on nystatin (4/28 - )   - Continue with mouth care     CARDIOLOGY  # Shock state likely hemorrhagic vs vasoplegia  - Acute renal bleed noted requiring multiple transfusions and pressors in ICU.   - TTE 3/22 with EF 63 with normal LVRVSF with PASP 49  - Weaned off pressors in ICU and complicated by hypertension.   - Continue on norvasc 5 QD   - Monitor BP     # Autonomic vs pain  - Mild HTN and tachypnea noted likely pain induced vs autonomia?   - Continue on dilaudid pushes PRN    PULMONARY  # Respiratory failure   - Presented with SCC and hypoxia likely from anemia with no signs of acute chest, however noted with PEA with hemorrhagic shock post renal bx requiring intubation.   - Prolonged intubation and s/p 7 PORTEX trach on 4/4   - Continue on vent 20/460/6/30 with nebs and chest PT  - Continue on dilaudid PRN for dyssynchrony     GI  # Shock liver vs cirrhosis with sickle cell   - Transaminitis noted in ICU and thought to be second to shock liver likely second to hemorrhagic shock vs cardiac arrest, however LFTs remains elevated with acute on chronic hyperbilirubinemia.   - US ABD suggestive of early cirrhotic changes, cholecystectomy, and CBD 9mm   - CT AP with cirrhosis   - LFTs rising with bromocriptine   - Holding further bromocriptine   - Trend LFTs and bili     # GIB  - Anemia with GIB noted in ICU   - s/p EGD 4/11 with cauterization of gastric ulcer & hemostatic spraying of three duodenal ulcers. Gastric ulcer likely due to NGT trauma.   - Continue on PPI BID   - Monitor stools and HH     # Oropharyngeal dysphagia   - GI unable to place PEG due to hepatomegaly.   - IR unable to place PEG second to no safe window   - s/p open G TUBE placement on 4/18   - Continue on TF    RENAL  # Acute renal failure and HAGMA second to shock state   - Scr on admission was 1.25 (3/15/25) and increased to 6s while in ICU and s/p HD 3/24-3/29 and then again on 4/4.   - L SHILEY removed on 4/15   - Passed TOV on 4/23   - Continue on HCO3 1300 TID tabs with improved acidosis   - Monitor renal function, acidosis, and UOP     # L renal mass bx c/b hematoma   - Renal bx as below on 3/19  - Hemorrhagic shock and PEA arrest on 3/20   - Renal US with large L renal subcapsular hematoma.    - IR left renal angiogram and embolization on 3/20   - CT 4/6 with unchanged large left subcapsular and perinephric hematoma.  - CT A/P (4/20) showing stable subcapsular renal hematoma and NO new active bleeds.  - Monitor for now    # Hypernatremia  - s/p D5W   - Continue on  Q6H however sodium worsening   - Continue on  Q6H per renal  - Monitor sodium     # Hyperphosphatemia   - Phos elevation likely second to renal failure   - PTH elevated, however Phos slowly down trending   - Monitor phos    INFECTIOUS DISEASE   # Central fevers  - Recurrent fevers and complete LVQ empirically as below   - Started on bromocriptine with improvement in fever curve, however noted with transaminitis and bromocriptine stopped with return of elevated temperature with tmax 100.2F on 4/28.   - Hold work up for now   - Hold further ABX for now   - Monitor fever curve.     # Questionable UTI  - Recurrent fevers with POSITIVE UA and s/p LVQ (4/23 - 4/28)   - Monitor off ABX     # VAP   - Post arrest noted with concern for aspiration and VAP in ICU   - Flu, SCx, BAL, BCx and UCx negative   - MRSA PCR with MSSA and completed bactroban in ICU   - Completed aztreonam (3/22-3/27) then casey (3/27-3/31) and vanco by dose in ICU   - CXR in ICU with RUL consolidation and completed recurrent casey course in ICU (4/2-4/7).     HEMATOLOGIC  # Anemia with SCC vs L kidney bleed vs GIB   - Baseline hemoglobin outpatient ~6.0-7.0 and sent in for anemia down to 5.3 without signs of bleeding and more likely SCC.    - Transfusion given on admission and improved back to baseline.   - Course complicated by hemorrhagic shock from left renal hematoma and HH down to 3.4.  - s/p 10U PRBC total while in ICU   - s/p PRBC pre-GTUBE with HH increased to 8.0  - c/b rapid decline in HH post GTUBE likely normalizing to baseline 6-7 as RPT CT AP post G TUBE with new bleeds and no acute signs of sickling.   - s/p PRBC 4/22   - Monitor HH     # SCD   - Discontinued deferasirox due to current renal failure   - Monitor Sickle labs QD (CBC with Diff, Retic, BMP, Hepatic Function, LDH and Hapto, and VBG).     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   - Continue on DVT PPX HSQ (4/24 - )  - LUE precautions     # LUE arm infiltration   - HCO3 GTT infiltration in ICU   - Seen by hand sx and no compartment syndrome concern   - Monitor for now     # RUE blood infiltration   - RUE blood transfusion infiltration noted on 4/17 with area of ecchymosis   - RPT DOPPLER with known R IJ DVT, however no upper arm thrombus or issues in area of infiltration  - Monitor site for now    ENDOCRINE   # Glycemic control   - Monitor BG   - No hx of DM2     SKIN   - Wound care reccs appreciated    ETHICS   - FULL CODE     DISPO - vent facility if remains fever free  45 YO M with PMHx of sickle cell disease with prior acute chest syndrome requiring transfusion and exchange in the past (last 12/2024), iron overload, gout, HTN, and RUE DVT in 12/2024 on eliquis who presented on 3/15 by his hematologist second to anemia and hypoxia, down to 5.3 from baseline 7-8, with concern for vaso-occlusive crisis. While on medicine, anemia improved post transfusion and continued on SCC pain control PCA. Patient noted with left renal mass during prior admission and s/p L ureteroscopy with biopsy and stent placement on 3/19. Course complicated with hypoglycemia on 3/20 with RRT x 2 and found with severe anemia to 3.4 with hemorrhagic shock second to left perirenal and subcapsular hemorrhage and ultimately PEA arrest. ROSC achieved and transferred to MICU. s/p IR emobilization and course complicated anoxic brain injury, myoclonic jerks, GIB, MATA, HAGMA, DVTs (R IJ and LUE brachial), dysphagia, cirrhosis, and prolonged vent time s/p trach on 4/4. Transferred to [facility] on 4/18.     NEUROLOGY  # Anoxic brain injury   - s/p cardiac arrest on 3/20 with ROSC in 4 minutes   - CT HEAD post arrest with diffuse sulcal effacement with increased intracranial pressure, and few patchy foci of cortical blurring concern for HIE  - MRI brain post arrest with with diffuse global abnormal supratentorial cortical restricted diffusion consistent with global hypoxic injury   - Monitor for now    # Seizures vs myoclonic jerks   - Witness myoclonic jerking concerning for seizures with anoxic brain injury  - vEEG with abundant myoclonic seizures in the setting of a severe diffuse/multifocal cerebral dysfunction which can be seen in the setting of sedative effect or due to anoxia  - s/p valium 10 Q4H   - Continue on keppra   - Continue on klonopin 2mg TID   - Continue on dilaudid PRN     # Central fevers   - Persistent fever spikes noted with concern for central fevers   - Bromocriptine started on 4/23, however noted with rise in LFTs and now stopped on 4/27   - Monitor fever curve.     HEENT  # Tongue biting   - Noted with tongue trauma  - s/p bite block replacement by OMFS on 4/23  - Plan for dental mouthguard mold creation on 5/1   - Continue with mouth care     # Thrush   - Continue on nystatin (4/28 - )   - Continue with mouth care     CARDIOLOGY  # Shock state likely hemorrhagic vs vasoplegia  - Acute renal bleed noted requiring multiple transfusions and pressors in ICU.   - TTE 3/22 with EF 63 with normal LVRVSF with PASP 49  - Weaned off pressors in ICU and complicated by hypertension.   - Continue on norvasc 5 QD   - Monitor BP     # Autonomic vs pain  - Mild HTN and tachypnea noted likely pain induced vs autonomia?   - Continue on dilaudid pushes PRN    PULMONARY  # Respiratory failure   - Presented with SCC and hypoxia likely from anemia with no signs of acute chest, however noted with PEA with hemorrhagic shock post renal bx requiring intubation.   - Prolonged intubation and s/p 7 PORTEX trach on 4/4   - Continue on vent 20/460/6/30 with nebs and chest PT  - Continue on dilaudid PRN for dyssynchrony     GI  # Shock liver vs cirrhosis with sickle cell   - Transaminitis noted in ICU and thought to be second to shock liver likely second to hemorrhagic shock vs cardiac arrest, however LFTs remains elevated with acute on chronic hyperbilirubinemia.   - US ABD suggestive of early cirrhotic changes, cholecystectomy, and CBD 9mm   - CT AP with cirrhosis   - LFTs rising with bromocriptine   - Holding further bromocriptine   - Trend LFTs and bili     # GIB  - Anemia with GIB noted in ICU   - s/p EGD 4/11 with cauterization of gastric ulcer & hemostatic spraying of three duodenal ulcers. Gastric ulcer likely due to NGT trauma.   - Continue on PPI BID   - Monitor stools and HH     # Oropharyngeal dysphagia   - GI unable to place PEG due to hepatomegaly.   - IR unable to place PEG second to no safe window   - s/p open G TUBE placement on 4/18   - Continue on TF    RENAL  # Acute renal failure and HAGMA second to shock state   - Scr on admission was 1.25 (3/15/25) and increased to 6s while in ICU and s/p HD 3/24-3/29 and then again on 4/4.   - L SHILEY removed on 4/15   - Passed TOV on 4/23   - Continue on HCO3 1300 TID tabs with improved acidosis   - Monitor renal function, acidosis, and UOP     # L renal mass bx c/b hematoma   - Renal bx as below on 3/19  - Hemorrhagic shock and PEA arrest on 3/20   - Renal US with large L renal subcapsular hematoma.    - IR left renal angiogram and embolization on 3/20   - CT 4/6 with unchanged large left subcapsular and perinephric hematoma.  - CT A/P (4/20) showing stable subcapsular renal hematoma and NO new active bleeds.  - Monitor for now    # Hypernatremia  - s/p D5W   - Continue on  Q6H however sodium worsening   - Continue on  Q6H per renal with no improvement.   - Continue on D5 x 10 hours today   - Monitor sodium     # Hyperphosphatemia   - Phos elevation likely second to renal failure   - PTH elevated, however Phos slowly down trending   - Monitor phos    INFECTIOUS DISEASE   # Central fevers  - Recurrent fevers and complete LVQ empirically as below   - Started on bromocriptine with improvement in fever curve, however noted with transaminitis and bromocriptine stopped with return of elevated temperature intermittently.   - Patient with known R IJ DVT and L kidney hematoma that can cause intermittent fevers.   - Hold work up for now   - Check LE DOPPLERS  - Hold further ABX for now   - Monitor fever curve.     # Questionable UTI  - Recurrent fevers with POSITIVE UA and s/p LVQ (4/23 - 4/28)   - Monitor off ABX     # VAP   - Post arrest noted with concern for aspiration and VAP in ICU   - Flu, SCx, BAL, BCx and UCx negative   - MRSA PCR with MSSA and completed bactroban in ICU   - Completed aztreonam (3/22-3/27) then casey (3/27-3/31) and vanco by dose in ICU   - CXR in ICU with RUL consolidation and completed recurrent casey course in ICU (4/2-4/7).     HEMATOLOGIC  # Anemia with SCC vs L kidney bleed vs GIB   - Baseline hemoglobin outpatient ~6.0-7.0 and sent in for anemia down to 5.3 without signs of bleeding and more likely SCC.    - Transfusion given on admission and improved back to baseline.   - Course complicated by hemorrhagic shock from left renal hematoma and HH down to 3.4.  - s/p 10U PRBC total while in ICU   - s/p PRBC pre-GTUBE with HH increased to 8.0  - c/b rapid decline in HH post GTUBE likely normalizing to baseline 6-7 as RPT CT AP post G TUBE with new bleeds and no acute signs of sickling.   - s/p PRBC 4/22   - Monitor HH     # SCD   - Discontinued deferasirox due to current renal failure   - Monitor Sickle labs QD (CBC with Diff, Retic, BMP, Hepatic Function, LDH and Hapto, and VBG).     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   - Continue on DVT PPX HSQ (4/24 - )  - LUE precautions     # LUE arm infiltration   - HCO3 GTT infiltration in ICU   - Seen by hand sx and no compartment syndrome concern   - Monitor for now     # RUE blood infiltration   - RUE blood transfusion infiltration noted on 4/17 with area of ecchymosis   - RPT DOPPLER with known R IJ DVT, however no upper arm thrombus or issues in area of infiltration  - Monitor site for now    ENDOCRINE   # Glycemic control   - Monitor BG   - No hx of DM2     SKIN   - Wound care reccs appreciated    ETHICS   - FULL CODE     DISPO - vent facility if remains fever free

## 2025-04-30 NOTE — PROGRESS NOTE ADULT - NS ATTEND AMEND GEN_ALL_CORE FT
agree with above  Tm 100.5 . No evidence of infection -- pt with other possible etiologies low grade fevers including thrombosis, bleed  now also with LE DVT. AC contraindicated. IVC filter if c/w overall GOC  monitoring off ac  hypernatremia, on D5  plan for dental fitting Friday  d/c plan is vent facility, poor prognosis

## 2025-05-01 LAB
ALBUMIN SERPL ELPH-MCNC: 2.9 G/DL — LOW (ref 3.3–5)
ALP SERPL-CCNC: 750 U/L — HIGH (ref 40–120)
ALT FLD-CCNC: 145 U/L — HIGH (ref 4–41)
ANION GAP SERPL CALC-SCNC: 12 MMOL/L — SIGNIFICANT CHANGE UP (ref 7–14)
ANION GAP SERPL CALC-SCNC: 12 MMOL/L — SIGNIFICANT CHANGE UP (ref 7–14)
AST SERPL-CCNC: 166 U/L — HIGH (ref 4–40)
BASOPHILS # BLD AUTO: 0.21 K/UL — HIGH (ref 0–0.2)
BASOPHILS NFR BLD AUTO: 1.1 % — SIGNIFICANT CHANGE UP (ref 0–2)
BILIRUB SERPL-MCNC: 2.7 MG/DL — HIGH (ref 0.2–1.2)
BLOOD GAS VENOUS COMPREHENSIVE RESULT: SIGNIFICANT CHANGE UP
BUN SERPL-MCNC: 73 MG/DL — HIGH (ref 7–23)
BUN SERPL-MCNC: 74 MG/DL — HIGH (ref 7–23)
CALCIUM SERPL-MCNC: 10.6 MG/DL — HIGH (ref 8.4–10.5)
CALCIUM SERPL-MCNC: 10.7 MG/DL — HIGH (ref 8.4–10.5)
CHLORIDE SERPL-SCNC: 119 MMOL/L — HIGH (ref 98–107)
CHLORIDE SERPL-SCNC: 120 MMOL/L — HIGH (ref 98–107)
CO2 SERPL-SCNC: 22 MMOL/L — SIGNIFICANT CHANGE UP (ref 22–31)
CO2 SERPL-SCNC: 22 MMOL/L — SIGNIFICANT CHANGE UP (ref 22–31)
CREAT SERPL-MCNC: 1.56 MG/DL — HIGH (ref 0.5–1.3)
CREAT SERPL-MCNC: 1.57 MG/DL — HIGH (ref 0.5–1.3)
EGFR: 55 ML/MIN/1.73M2 — LOW
EOSINOPHIL # BLD AUTO: 0.3 K/UL — SIGNIFICANT CHANGE UP (ref 0–0.5)
EOSINOPHIL NFR BLD AUTO: 1.6 % — SIGNIFICANT CHANGE UP (ref 0–6)
GLUCOSE BLDC GLUCOMTR-MCNC: 126 MG/DL — HIGH (ref 70–99)
GLUCOSE SERPL-MCNC: 144 MG/DL — HIGH (ref 70–99)
GLUCOSE SERPL-MCNC: 146 MG/DL — HIGH (ref 70–99)
HAPTOGLOB SERPL-MCNC: 43 MG/DL — SIGNIFICANT CHANGE UP (ref 34–200)
HCT VFR BLD CALC: 21.8 % — LOW (ref 39–50)
HGB BLD-MCNC: 7 G/DL — CRITICAL LOW (ref 13–17)
IANC: 14.55 K/UL — HIGH (ref 1.8–7.4)
IMM GRANULOCYTES NFR BLD AUTO: 0.7 % — SIGNIFICANT CHANGE UP (ref 0–0.9)
LDH SERPL L TO P-CCNC: 272 U/L — HIGH (ref 135–225)
LYMPHOCYTES # BLD AUTO: 12.4 % — LOW (ref 13–44)
LYMPHOCYTES # BLD AUTO: 2.35 K/UL — SIGNIFICANT CHANGE UP (ref 1–3.3)
MAGNESIUM SERPL-MCNC: 2.4 MG/DL — SIGNIFICANT CHANGE UP (ref 1.6–2.6)
MAGNESIUM SERPL-MCNC: 2.5 MG/DL — SIGNIFICANT CHANGE UP (ref 1.6–2.6)
MCHC RBC-ENTMCNC: 29.8 PG — SIGNIFICANT CHANGE UP (ref 27–34)
MCHC RBC-ENTMCNC: 32.1 G/DL — SIGNIFICANT CHANGE UP (ref 32–36)
MCV RBC AUTO: 92.8 FL — SIGNIFICANT CHANGE UP (ref 80–100)
MONOCYTES # BLD AUTO: 1.35 K/UL — HIGH (ref 0–0.9)
MONOCYTES NFR BLD AUTO: 7.1 % — SIGNIFICANT CHANGE UP (ref 2–14)
NEUTROPHILS # BLD AUTO: 14.55 K/UL — HIGH (ref 1.8–7.4)
NEUTROPHILS NFR BLD AUTO: 77.1 % — HIGH (ref 43–77)
NRBC # BLD AUTO: 0 K/UL — SIGNIFICANT CHANGE UP (ref 0–0)
NRBC # FLD: 0 K/UL — SIGNIFICANT CHANGE UP (ref 0–0)
NRBC BLD AUTO-RTO: 0 /100 WBCS — SIGNIFICANT CHANGE UP (ref 0–0)
PHOSPHATE SERPL-MCNC: 2.8 MG/DL — SIGNIFICANT CHANGE UP (ref 2.5–4.5)
PHOSPHATE SERPL-MCNC: 3.2 MG/DL — SIGNIFICANT CHANGE UP (ref 2.5–4.5)
PLATELET # BLD AUTO: 363 K/UL — SIGNIFICANT CHANGE UP (ref 150–400)
POTASSIUM SERPL-MCNC: 4 MMOL/L — SIGNIFICANT CHANGE UP (ref 3.5–5.3)
POTASSIUM SERPL-MCNC: 4.3 MMOL/L — SIGNIFICANT CHANGE UP (ref 3.5–5.3)
POTASSIUM SERPL-SCNC: 4 MMOL/L — SIGNIFICANT CHANGE UP (ref 3.5–5.3)
POTASSIUM SERPL-SCNC: 4.3 MMOL/L — SIGNIFICANT CHANGE UP (ref 3.5–5.3)
PROT SERPL-MCNC: 8.1 G/DL — SIGNIFICANT CHANGE UP (ref 6–8.3)
RBC # BLD: 2.35 M/UL — LOW (ref 4.2–5.8)
RBC # BLD: 2.35 M/UL — LOW (ref 4.2–5.8)
RBC # FLD: 17.7 % — HIGH (ref 10.3–14.5)
RETICS #: 63.2 K/UL — SIGNIFICANT CHANGE UP (ref 25–125)
RETICS/RBC NFR: 2.7 % — HIGH (ref 0.5–2.5)
SODIUM SERPL-SCNC: 153 MMOL/L — HIGH (ref 135–145)
SODIUM SERPL-SCNC: 154 MMOL/L — HIGH (ref 135–145)
WBC # BLD: 18.9 K/UL — HIGH (ref 3.8–10.5)
WBC # FLD AUTO: 18.9 K/UL — HIGH (ref 3.8–10.5)

## 2025-05-01 PROCEDURE — 99233 SBSQ HOSP IP/OBS HIGH 50: CPT

## 2025-05-01 RX ORDER — SODIUM CHLORIDE 9 G/1000ML
1000 INJECTION, SOLUTION INTRAVENOUS
Refills: 0 | Status: DISCONTINUED | OUTPATIENT
Start: 2025-05-01 | End: 2025-05-01

## 2025-05-01 RX ORDER — SODIUM CHLORIDE 9 G/1000ML
1000 INJECTION, SOLUTION INTRAVENOUS
Refills: 0 | Status: COMPLETED | OUTPATIENT
Start: 2025-05-01 | End: 2025-05-02

## 2025-05-01 RX ORDER — HYDROMORPHONE/SOD CHLOR,ISO/PF 2 MG/10 ML
0.5 SYRINGE (ML) INJECTION EVERY 4 HOURS
Refills: 0 | Status: DISCONTINUED | OUTPATIENT
Start: 2025-05-01 | End: 2025-05-08

## 2025-05-01 RX ORDER — CLONAZEPAM 0.5 MG/1
2 TABLET ORAL EVERY 8 HOURS
Refills: 0 | Status: DISCONTINUED | OUTPATIENT
Start: 2025-05-01 | End: 2025-05-08

## 2025-05-01 RX ADMIN — LEVETIRACETAM 500 MILLIGRAM(S): 10 INJECTION, SOLUTION INTRAVENOUS at 17:23

## 2025-05-01 RX ADMIN — CLONAZEPAM 2 MILLIGRAM(S): 0.5 TABLET ORAL at 05:43

## 2025-05-01 RX ADMIN — Medication 15 MILLILITER(S): at 17:23

## 2025-05-01 RX ADMIN — Medication 40 MILLIGRAM(S): at 05:44

## 2025-05-01 RX ADMIN — Medication 500000 UNIT(S): at 05:43

## 2025-05-01 RX ADMIN — Medication 0.5 MILLIGRAM(S): at 13:23

## 2025-05-01 RX ADMIN — Medication 0.5 MILLIGRAM(S): at 06:15

## 2025-05-01 RX ADMIN — LEVETIRACETAM 500 MILLIGRAM(S): 10 INJECTION, SOLUTION INTRAVENOUS at 05:44

## 2025-05-01 RX ADMIN — CLONAZEPAM 2 MILLIGRAM(S): 0.5 TABLET ORAL at 13:19

## 2025-05-01 RX ADMIN — IPRATROPIUM BROMIDE AND ALBUTEROL SULFATE 3 MILLILITER(S): .5; 2.5 SOLUTION RESPIRATORY (INHALATION) at 21:12

## 2025-05-01 RX ADMIN — SODIUM CHLORIDE 100 MILLILITER(S): 9 INJECTION, SOLUTION INTRAVENOUS at 18:11

## 2025-05-01 RX ADMIN — Medication 500000 UNIT(S): at 11:05

## 2025-05-01 RX ADMIN — IPRATROPIUM BROMIDE AND ALBUTEROL SULFATE 3 MILLILITER(S): .5; 2.5 SOLUTION RESPIRATORY (INHALATION) at 09:35

## 2025-05-01 RX ADMIN — SODIUM CHLORIDE 75 MILLILITER(S): 9 INJECTION, SOLUTION INTRAVENOUS at 09:30

## 2025-05-01 RX ADMIN — Medication 15 MILLILITER(S): at 05:44

## 2025-05-01 RX ADMIN — HEPARIN SODIUM 5000 UNIT(S): 1000 INJECTION INTRAVENOUS; SUBCUTANEOUS at 17:23

## 2025-05-01 RX ADMIN — HEPARIN SODIUM 5000 UNIT(S): 1000 INJECTION INTRAVENOUS; SUBCUTANEOUS at 05:43

## 2025-05-01 RX ADMIN — Medication 500000 UNIT(S): at 17:22

## 2025-05-01 RX ADMIN — AMLODIPINE BESYLATE 5 MILLIGRAM(S): 10 TABLET ORAL at 05:45

## 2025-05-01 RX ADMIN — IPRATROPIUM BROMIDE AND ALBUTEROL SULFATE 3 MILLILITER(S): .5; 2.5 SOLUTION RESPIRATORY (INHALATION) at 03:44

## 2025-05-01 RX ADMIN — CLONAZEPAM 2 MILLIGRAM(S): 0.5 TABLET ORAL at 21:34

## 2025-05-01 RX ADMIN — Medication 0.5 MILLIGRAM(S): at 21:32

## 2025-05-01 RX ADMIN — Medication 40 MILLIGRAM(S): at 17:23

## 2025-05-01 RX ADMIN — IPRATROPIUM BROMIDE AND ALBUTEROL SULFATE 3 MILLILITER(S): .5; 2.5 SOLUTION RESPIRATORY (INHALATION) at 16:03

## 2025-05-01 RX ADMIN — Medication 1 APPLICATION(S): at 11:05

## 2025-05-01 RX ADMIN — Medication 1300 MILLIGRAM(S): at 05:44

## 2025-05-01 RX ADMIN — SODIUM CHLORIDE 100 MILLILITER(S): 9 INJECTION, SOLUTION INTRAVENOUS at 21:32

## 2025-05-01 NOTE — CONSULT NOTE ADULT - SUBJECTIVE AND OBJECTIVE BOX
Vascular & Interventional Radiology Brief Consult Note    Evaluate for Procedure: _IVC filter placement    HPI: 43 YO M with PMHx of sickle cell disease with prior acute chest syndrome requiring transfusion and exchange in the past (last 12/2024), iron overload, gout, HTN, and RUE DVT in 12/2024 on eliquis who presented on 3/15 by his hematologist second to anemia and hypoxia, down to 5.3 from baseline 7-8, with concern for vaso-occlusive crisis. While on medicine, anemia improved post transfusion and continued on SCC pain control PCA. Patient noted with left renal mass during prior admission and s/p L ureteroscopy with biopsy and stent placement on 3/19. Course complicated with hypoglycemia on 3/20 with RRT x 2 and found with severe anemia to 3.4 with hemorrhagic shock second to left perirenal and subcapsular hemorrhage and ultimately PEA arrest. ROSC achieved and transferred to MICU. s/p IR emobilization and course complicated anoxic brain injury, myoclonic jerks, GIB, MATA, HAGMA, DVTs (R IJ and LUE brachial), dysphagia, cirrhosis, and prolonged vent time s/p trach on 4/4. Transferred to [facility] on 4/18.       IR consulted for IVC filter placement given new L gastrocnemius DVT 4/30/25 and in setting of Lt kidney hematoma sp embolization.    Allergies: penicillin (Pruritus)  hydroxyurea (Other)  piperacillin-tazobactam (Urticaria)  ceftriaxone (Anaphylaxis)    Medications (Abx/Cardiac/Anticoagulation/Blood Products)  amLODIPine   Tablet: 5 milliGRAM(s) Oral (05-01 @ 05:45)  heparin   Injectable: 5000 Unit(s) SubCutaneous (05-01 @ 05:43)  nystatin    Suspension: 764764 Unit(s) Oral (05-01 @ 05:43)    Data:    T(C): 36.4  HR: 104  BP: 130/95  RR: 20  SpO2: 100%    -WBC 18.90 / HgB 7.0 / Hct 21.8 / Plt 363  -Na 153 / Cl 119 / BUN 74 / Glucose 146  -K 4.3 / CO2 22 / Cr 1.57  - / Alk Phos 750 / T.Bili 2.7    Imaging: _Reviewed    Assessment/Plan:    43 YO M with PMHx of sickle cell disease with prior acute chest syndrome requiring transfusion and exchange in the past (last 12/2024), iron overload, gout, HTN, and RUE DVT in 12/2024 on eliquis who presented on 3/15 by his hematologist second to anemia and hypoxia, down to 5.3 from baseline 7-8, with concern for vaso-occlusive crisis. While on medicine, anemia improved post transfusion and continued on SCC pain control PCA. Patient noted with left renal mass during prior admission and s/p L ureteroscopy with biopsy and stent placement on 3/19. Course complicated with hypoglycemia on 3/20 with RRT x 2 and found with severe anemia to 3.4 with hemorrhagic shock second to left perirenal and subcapsular hemorrhage and ultimately PEA arrest. ROSC achieved and transferred to MICU. s/p IR emobilization and course complicated anoxic brain injury, myoclonic jerks, GIB, MATA, HAGMA, DVTs (R IJ and LUE brachial), dysphagia, cirrhosis, and prolonged vent time s/p trach on 4/4. Transferred to RCU on 4/18.       -IR consulted for IVC filter placement given new L gastrocnemius DVT 4/30/25 and in setting of Lt kidney hematoma sp embolization.  -Discussed case with attending.  -  less than 10 percent of pt with below knee DVTs,  migrate to PE/IVC,  so IVC filter placement is not indicated at the time.   -we recommend surveillance with ultrasound.   -Your team can repeat Duplex lower extremity venous US in 1 week and if progression to femoropopliteal DVT can [place] back. If normal, can stop surveillance  -Discussed with provider taking care of pt at time.  - IR deferring procedure at this time, will sign off, can reconsult as needed.  ---------------------------------------------  - Nonemergent consults:  [facility] order "Consult- Interventional Radiology", no page required  - Emergent issues (pager): [facility] [phone]; [facility] [phone]; [phone]; DO NOT PAGE FOR SCHEDULING QUESTIONS  - Scheduling questions 8am-6pm : [facility] [phone]; [facility] [phone],     Please note that urgent / emergent procedures take priority in the hospital. If a procedure is non-urgent and outpatient scheduling is preferable, please contact the following:     For outpatient IR Booking:   [facility]: [phone]  [facility]: [phone]    If pt has drain:  - Continue drainage, monitor output  - Can be discharged home with drain if outputs remain high  - Flush drain 5cc NS qd  - Will need noncontrast CT + IR tube check once outputs < 10cc/24hr, can be arranged as outpatient follow-up. Outpatient IR office [phone]   Vascular & Interventional Radiology Brief Consult Note    Evaluate for Procedure: _IVC filter placement    HPI: 45 YO M with PMHx of sickle cell disease with prior acute chest syndrome requiring transfusion and exchange in the past (last 12/2024), iron overload, gout, HTN, and RUE DVT in 12/2024 on eliquis who presented on 3/15 by his hematologist second to anemia and hypoxia, down to 5.3 from baseline 7-8, with concern for vaso-occlusive crisis. While on medicine, anemia improved post transfusion and continued on SCC pain control PCA. Patient noted with left renal mass during prior admission and s/p L ureteroscopy with biopsy and stent placement on 3/19. Course complicated with hypoglycemia on 3/20 with RRT x 2 and found with severe anemia to 3.4 with hemorrhagic shock second to left perirenal and subcapsular hemorrhage and ultimately PEA arrest. ROSC achieved and transferred to MICU. s/p IR emobilization and course complicated anoxic brain injury, myoclonic jerks, GIB, MATA, HAGMA, DVTs (R IJ and LUE brachial), dysphagia, cirrhosis, and prolonged vent time s/p trach on 4/4. Transferred to R[facility] on 4/18.       IR consulted for IVC filter placement given new L gastrocnemius DVT 4/30/25 and in setting of Lt kidney hematoma sp embolization.    Allergies: penicillin (Pruritus)  hydroxyurea (Other)  piperacillin-tazobactam (Urticaria)  ceftriaxone (Anaphylaxis)    Medications (Abx/Cardiac/Anticoagulation/Blood Products)  amLODIPine   Tablet: 5 milliGRAM(s) Oral (05-01 @ 05:45)  heparin   Injectable: 5000 Unit(s) SubCutaneous (05-01 @ 05:43)  nystatin    Suspension: [id number] Unit(s) Oral (05-01 @ 05:43)    Data:    T(C): 36.4  HR: 104  BP: 130/95  RR: 20  SpO2: 100%    -WBC 18.90 / HgB 7.0 / Hct 21.8 / Plt 363  -Na 153 / Cl 119 / BUN 74 / Glucose 146  -K 4.3 / CO2 22 / Cr 1.57  - / Alk Phos 750 / T.Bili 2.7    Imaging: _Reviewed    Assessment/Plan:    45 YO M with PMHx of sickle cell disease with prior acute chest syndrome requiring transfusion and exchange in the past (last 12/2024), iron overload, gout, HTN, and RUE DVT in 12/2024 on eliquis who presented on 3/15 by his hematologist second to anemia and hypoxia, down to 5.3 from baseline 7-8, with concern for vaso-occlusive crisis. While on medicine, anemia improved post transfusion and continued on SCC pain control PCA. Patient noted with left renal mass during prior admission and s/p L ureteroscopy with biopsy and stent placement on 3/19. Course complicated with hypoglycemia on 3/20 with RRT x 2 and found with severe anemia to 3.4 with hemorrhagic shock second to left perirenal and subcapsular hemorrhage and ultimately PEA arrest. ROSC achieved and transferred to MICU. s/p IR emobilization and course complicated anoxic brain injury, myoclonic jerks, GIB, MATA, HAGMA, DVTs (R IJ and LUE brachial), dysphagia, cirrhosis, and prolonged vent time s/p trach on 4/4. Transferred to RCU on 4/18.       -IR consulted for IVC filter placement given new L gastrocnemius DVT 4/30/25 and in setting of Lt kidney hematoma sp embolization.  -Discussed case with attending.  -  Low incidence of below the knee DVT progressing to clinically significant PE, so IVC filter placement is not indicated at the time.   -we recommend surveillance with ultrasound.   -Your team can repeat Duplex lower extremity venous US in 1 week and if progression to femoropopliteal DVT can reeval for IVC filter insertion. If normal, can stop surveillance  -Discussed with provider taking care of pt at time.  - IR deferring procedure at this time, will sign off, can reconsult as needed.  ---------------------------------------------  - Nonemergent consults:  [facility] order "Consult- Interventional Radiology", no page required  - Emergent issues (pager): [facility] [phone]; [facility] [phone]; [phone]; DO NOT PAGE FOR SCHEDULING QUESTIONS  - Scheduling questions 8am-6pm : [facility] [phone]; [facility] [phone],     Please note that urgent / emergent procedures take priority in the hospital. If a procedure is non-urgent and outpatient scheduling is preferable, please contact the following:     For outpatient IR Booking:   [facility]: [phone]  [facility]: [phone]    If pt has drain:  - Continue drainage, monitor output  - Can be discharged home with drain if outputs remain high  - Flush drain 5cc NS qd  - Will need noncontrast CT + IR tube check once outputs < 10cc/24hr, can be arranged as outpatient follow-up. Outpatient IR office [phone]

## 2025-05-01 NOTE — PROGRESS NOTE ADULT - NS ATTEND AMEND GEN_ALL_CORE FT
agree with above  IR noted.  will plan to repeat the LE doppler on Monday - as pt in process of d/c planning   low grade/borderline temps, no evidence of infection, all cx neg. pt with upper and lower extremity dvt which can explain it  dental fitting tomorrow

## 2025-05-01 NOTE — CHART NOTE - NSCHARTNOTEFT_GEN_A_CORE
[facility] PA NOTE     Called by RN for patient biting tongue. Bite block dislodged. Seen by bedside and unable to open mouth. Jaw massage performed, however no improvement. Attempted to induce cough, but unable to open mouth. OMFS called, mouth prop gag opener used, and bite block placed.    DEMOND Pickens, PA-C  Department of Medicine/ [facility]  In house [facility] [place] [phone]  In house Medicine [place] [phone]  Reachable via teams

## 2025-05-01 NOTE — PROGRESS NOTE ADULT - ASSESSMENT
43 YO M with PMHx of sickle cell disease with prior acute chest syndrome requiring transfusion and exchange in the past (last 12/2024), iron overload, gout, HTN, and RUE DVT in 12/2024 on eliquis who presented on 3/15 by his hematologist second to anemia and hypoxia, down to 5.3 from baseline 7-8, with concern for vaso-occlusive crisis. While on medicine, anemia improved post transfusion and continued on SCC pain control PCA. Patient noted with left renal mass during prior admission and s/p L ureteroscopy with biopsy and stent placement on 3/19. Course complicated with hypoglycemia on 3/20 with RRT x 2 and found with severe anemia to 3.4 with hemorrhagic shock second to left perirenal and subcapsular hemorrhage and ultimately PEA arrest. ROSC achieved and transferred to MICU. s/p IR emobilization and course complicated anoxic brain injury, myoclonic jerks, GIB, MATA, HAGMA, DVTs (R IJ and LUE brachial), dysphagia, cirrhosis, and prolonged vent time s/p trach on 4/4. Transferred to [facility] on 4/18.     NEUROLOGY  # Anoxic brain injury   - s/p cardiac arrest on 3/20 with ROSC in 4 minutes   - CT HEAD post arrest with diffuse sulcal effacement with increased intracranial pressure, and few patchy foci of cortical blurring concern for HIE  - MRI brain post arrest with with diffuse global abnormal supratentorial cortical restricted diffusion consistent with global hypoxic injury   - Monitor for now    # Seizures vs myoclonic jerks   - Witness myoclonic jerking concerning for seizures with anoxic brain injury  - vEEG with abundant myoclonic seizures in the setting of a severe diffuse/multifocal cerebral dysfunction which can be seen in the setting of sedative effect or due to anoxia  - s/p valium 10 Q4H   - Continue on keppra   - Continue on klonopin 2mg TID   - Continue on dilaudid PRN     # Central fevers   - Persistent fever spikes noted with concern for central fevers   - Bromocriptine started on 4/23, however noted with rise in LFTs and now stopped on 4/27   - Monitor fever curve.     HEENT  # Tongue biting   - Noted with tongue trauma  - s/p bite block replacement by OMFS on 4/23  - Plan for dental mouthguard mold creation on 5/1   - Continue with mouth care     # Thrush   - Continue on nystatin (4/28 - )   - Continue with mouth care     CARDIOLOGY  # Shock state likely hemorrhagic vs vasoplegia  - Acute renal bleed noted requiring multiple transfusions and pressors in ICU.   - TTE 3/22 with EF 63 with normal LVRVSF with PASP 49  - Weaned off pressors in ICU and complicated by hypertension.   - Continue on norvasc 5 QD   - Monitor BP     # Autonomic vs pain  - Mild HTN and tachypnea noted likely pain induced vs autonomia?   - Continue on dilaudid pushes PRN    PULMONARY  # Respiratory failure   - Presented with SCC and hypoxia likely from anemia with no signs of acute chest, however noted with PEA with hemorrhagic shock post renal bx requiring intubation.   - Prolonged intubation and s/p 7 PORTEX trach on 4/4   - Continue on vent 20/460/6/30 with nebs and chest PT  - Continue on dilaudid PRN for dyssynchrony     GI  # Shock liver vs cirrhosis with sickle cell   - Transaminitis noted in ICU and thought to be second to shock liver likely second to hemorrhagic shock vs cardiac arrest, however LFTs remains elevated with acute on chronic hyperbilirubinemia.   - US ABD suggestive of early cirrhotic changes, cholecystectomy, and CBD 9mm   - CT AP with cirrhosis   - LFTs rising with bromocriptine   - Holding further bromocriptine   - Trend LFTs and bili     # GIB  - Anemia with GIB noted in ICU   - s/p EGD 4/11 with cauterization of gastric ulcer & hemostatic spraying of three duodenal ulcers. Gastric ulcer likely due to NGT trauma.   - Continue on PPI BID   - Monitor stools and HH     # Oropharyngeal dysphagia   - GI unable to place PEG due to hepatomegaly.   - IR unable to place PEG second to no safe window   - s/p open G TUBE placement on 4/18   - Continue on TF    RENAL  # Acute renal failure and HAGMA second to shock state   - Scr on admission was 1.25 (3/15/25) and increased to 6s while in ICU and s/p HD 3/24-3/29 and then again on 4/4.   - L SHILEY removed on 4/15   - Passed TOV on 4/23   - Continue on HCO3 1300 TID tabs with improved acidosis   - Monitor renal function, acidosis, and UOP     # L renal mass bx c/b hematoma   - Renal bx as below on 3/19  - Hemorrhagic shock and PEA arrest on 3/20   - Renal US with large L renal subcapsular hematoma.    - IR left renal angiogram and embolization on 3/20   - CT 4/6 with unchanged large left subcapsular and perinephric hematoma.  - CT A/P (4/20) showing stable subcapsular renal hematoma and NO new active bleeds.  - Monitor for now    # Hypernatremia  - s/p D5W   - Continue on  Q6H however sodium worsening   - Continue on  Q6H per renal with no improvement.   - Continue on D5 x 10 hours today   - Monitor sodium     # Hypercalcemia likely from dehydration   - Rehydrate as above and monitor Ca     # Hyperphosphatemia   - Phos elevation likely second to renal failure   - PTH elevated, however Phos slowly down trending   - Monitor phos    INFECTIOUS DISEASE   # Central fevers  - Recurrent fevers and complete LVQ empirically as below   - Started on bromocriptine with improvement in fever curve, however noted with transaminitis and bromocriptine stopped with return of elevated temperature intermittently.   - Patient with known R IJ DVT and L kidney hematoma that can cause intermittent fevers.   - Hold work up for now   - Check LE DOPPLERS  - Hold further ABX for now   - Monitor fever curve.     # Questionable UTI  - Recurrent fevers with POSITIVE UA and s/p LVQ (4/23 - 4/28)   - Monitor off ABX     # VAP   - Post arrest noted with concern for aspiration and VAP in ICU   - Flu, SCx, BAL, BCx and UCx negative   - MRSA PCR with MSSA and completed bactroban in ICU   - Completed aztreonam (3/22-3/27) then casey (3/27-3/31) and vanco by dose in ICU   - CXR in ICU with RUL consolidation and completed recurrent casey course in ICU (4/2-4/7).     HEMATOLOGIC  # Anemia with SCC vs L kidney bleed vs GIB   - Baseline hemoglobin outpatient ~6.0-7.0 and sent in for anemia down to 5.3 without signs of bleeding and more likely SCC.    - Transfusion given on admission and improved back to baseline.   - Course complicated by hemorrhagic shock from left renal hematoma and HH down to 3.4.  - s/p 10U PRBC total while in ICU   - s/p PRBC pre-GTUBE with HH increased to 8.0  - c/b rapid decline in HH post GTUBE likely normalizing to baseline 6-7 as RPT CT AP post G TUBE with new bleeds and no acute signs of sickling.   - s/p PRBC 4/22   - Monitor HH     # SCD   - Discontinued deferasirox due to current renal failure   - Monitor Sickle labs QD (CBC with Diff, Retic, BMP, Hepatic Function, LDH and Hapto, and VBG).     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   - Continue on DVT PPX HSQ (4/24 - )  - LUE precautions     # LUE arm infiltration   - HCO3 GTT infiltration in ICU   - Seen by hand sx and no compartment syndrome concern   - Monitor for now     # RUE blood infiltration   - RUE blood transfusion infiltration noted on 4/17 with area of ecchymosis   - RPT DOPPLER with known R IJ DVT, however no upper arm thrombus or issues in area of infiltration  - Monitor site for now    ENDOCRINE   # Glycemic control   - Monitor BG   - No hx of DM2     SKIN   - Wound care reccs appreciated    ETHICS   - FULL CODE     DISPO - vent facility if remains fever free  45 YO M with PMHx of sickle cell disease with prior acute chest syndrome requiring transfusion and exchange in the past (last 12/2024), iron overload, gout, HTN, and RUE DVT in 12/2024 on eliquis who presented on 3/15 by his hematologist second to anemia and hypoxia, down to 5.3 from baseline 7-8, with concern for vaso-occlusive crisis. While on medicine, anemia improved post transfusion and continued on SCC pain control PCA. Patient noted with left renal mass during prior admission and s/p L ureteroscopy with biopsy and stent placement on 3/19. Course complicated with hypoglycemia on 3/20 with RRT x 2 and found with severe anemia to 3.4 with hemorrhagic shock second to left perirenal and subcapsular hemorrhage and ultimately PEA arrest. ROSC achieved and transferred to MICU. s/p IR emobilization and course complicated anoxic brain injury, myoclonic jerks, GIB, MATA, HAGMA, DVTs (R IJ and LUE brachial), dysphagia, cirrhosis, and prolonged vent time s/p trach on 4/4. Transferred to [facility] on 4/18.     NEUROLOGY  # Anoxic brain injury   - s/p cardiac arrest on 3/20 with ROSC in 4 minutes   - CT HEAD post arrest with diffuse sulcal effacement with increased intracranial pressure, and few patchy foci of cortical blurring concern for HIE  - MRI brain post arrest with with diffuse global abnormal supratentorial cortical restricted diffusion consistent with global hypoxic injury   - Monitor for now    # Seizures vs myoclonic jerks   - Witness myoclonic jerking concerning for seizures with anoxic brain injury  - vEEG with abundant myoclonic seizures in the setting of a severe diffuse/multifocal cerebral dysfunction which can be seen in the setting of sedative effect or due to anoxia  - s/p valium 10 Q4H   - Continue on keppra   - Continue on klonopin 2mg TID   - Continue on dilaudid PRN     # Central fevers   - Persistent fever spikes noted with concern for central fevers   - Bromocriptine started on 4/23, however noted with rise in LFTs and now stopped on 4/27   - Monitor fever curve.     HEENT  # Tongue biting   - Noted with tongue trauma  - s/p bite block replacement by OMFS on 4/23  - Plan for dental mouthguard mold creation on 5/1   - Continue with mouth care     # Thrush   - Continue on nystatin (4/28 - )   - Continue with mouth care     CARDIOLOGY  # Shock state likely hemorrhagic vs vasoplegia  - Acute renal bleed noted requiring multiple transfusions and pressors in ICU.   - TTE 3/22 with EF 63 with normal LVRVSF with PASP 49  - Weaned off pressors in ICU and complicated by hypertension.   - Continue on norvasc 5 QD   - Monitor BP     # Autonomic vs pain  - Mild HTN and tachypnea noted likely pain induced vs autonomia?   - Continue on dilaudid pushes PRN    PULMONARY  # Respiratory failure   - Presented with SCC and hypoxia likely from anemia with no signs of acute chest, however noted with PEA with hemorrhagic shock post renal bx requiring intubation.   - Prolonged intubation and s/p 7 PORTEX trach on 4/4   - Continue on vent 20/460/6/30 with nebs and chest PT  - Continue on dilaudid PRN for dyssynchrony     GI  # Shock liver vs cirrhosis with sickle cell   - Transaminitis noted in ICU and thought to be second to shock liver likely second to hemorrhagic shock vs cardiac arrest, however LFTs remains elevated with acute on chronic hyperbilirubinemia.   - US ABD suggestive of early cirrhotic changes, cholecystectomy, and CBD 9mm   - CT AP with cirrhosis   - LFTs rising with bromocriptine   - Holding further bromocriptine   - Trend LFTs and bili     # GIB  - Anemia with GIB noted in ICU   - s/p EGD 4/11 with cauterization of gastric ulcer & hemostatic spraying of three duodenal ulcers. Gastric ulcer likely due to NGT trauma.   - Continue on PPI BID   - Monitor stools and HH     # Oropharyngeal dysphagia   - GI unable to place PEG due to hepatomegaly.   - IR unable to place PEG second to no safe window   - s/p open G TUBE placement on 4/18   - Continue on TF    RENAL  # Acute renal failure and HAGMA second to shock state   - Scr on admission was 1.25 (3/15/25) and increased to 6s while in ICU and s/p HD 3/24-3/29 and then again on 4/4.   - L SHILEY removed on 4/15   - Passed TOV on 4/23   - Continue on HCO3 1300 TID tabs with improved acidosis, however hypernatremic and stopped.   - Monitor renal function, acidosis, and UOP     # L renal mass bx c/b hematoma   - Renal bx as below on 3/19  - Hemorrhagic shock and PEA arrest on 3/20   - Renal US with large L renal subcapsular hematoma.    - IR left renal angiogram and embolization on 3/20   - CT 4/6 with unchanged large left subcapsular and perinephric hematoma.  - CT A/P (4/20) showing stable subcapsular renal hematoma and NO new active bleeds.  - Monitor for now    # Hypernatremia  - s/p D5W   - Continue on  Q6H however sodium worsening   - Continue on FWF with no improvement.   - Continue on D5 yesterday, however IV trouble noted and sodium rising today.   - Hold further HCO3 tabs  - Rehydrate again with D5 and  Q6H   - Monitor sodium     # Hypercalcemia likely from dehydration   - Rehydrate as above and monitor Ca     # Hyperphosphatemia   - Phos elevation likely second to renal failure   - PTH elevated, however Phos slowly down trending   - Monitor phos    INFECTIOUS DISEASE   # Central fevers  - Recurrent fevers and complete LVQ empirically as below   - Started on bromocriptine with improvement in fever curve, however noted with transaminitis and bromocriptine stopped with return of elevated temperature intermittently.   - Patient with known R IJ DVT and L kidney hematoma and now new LLE DVT that can cause intermittent fevers.   - Hold work up for now   - Hold further ABX for now   - Monitor fever curve.     # Questionable UTI  - Recurrent fevers with POSITIVE UA and s/p LVQ (4/23 - 4/28)   - Monitor off ABX     # VAP   - Post arrest noted with concern for aspiration and VAP in ICU   - Flu, SCx, BAL, BCx and UCx negative   - MRSA PCR with MSSA and completed bactroban in ICU   - Completed aztreonam (3/22-3/27) then casey (3/27-3/31) and vanco by dose in ICU   - CXR in ICU with RUL consolidation and completed recurrent casey course in ICU (4/2-4/7).     HEMATOLOGIC  # Anemia with SCC vs L kidney bleed vs GIB   - Baseline hemoglobin outpatient ~6.0-7.0 and sent in for anemia down to 5.3 without signs of bleeding and more likely SCC.    - Transfusion given on admission and improved back to baseline.   - Course complicated by hemorrhagic shock from left renal hematoma and HH down to 3.4.  - s/p 10U PRBC total while in ICU   - s/p PRBC pre-GTUBE with HH increased to 8.0  - c/b rapid decline in HH post GTUBE likely normalizing to baseline 6-7 as RPT CT AP post G TUBE with new bleeds and no acute signs of sickling.   - s/p PRBC 4/22   - Monitor HH     # SCD   - Discontinued deferasirox due to current renal failure   - Monitor Sickle labs QD (CBC with Diff, Retic, BMP, Hepatic Function, LDH and Hapto, and VBG).     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   - Discussed with IR and recommend holding IVF filter for now  - Continue on DVT PPX HSQ (4/24 - )   - RPT DOPPLER on Monday   - LUE and LLE precautions     # LUE arm infiltration   - HCO3 GTT infiltration in ICU   - Seen by hand sx and no compartment syndrome concern   - Monitor for now     # RUE blood infiltration   - RUE blood transfusion infiltration noted on 4/17 with area of ecchymosis   - RPT DOPPLER with known R IJ DVT, however no upper arm thrombus or issues in area of infiltration  - Monitor site for now    ENDOCRINE   # Glycemic control   - Monitor BG   - No hx of DM2     SKIN   - Wound care reccs appreciated    ETHICS   - FULL CODE     DISPO - vent facility when able 45 YO M with PMHx of sickle cell disease with prior acute chest syndrome requiring transfusion and exchange in the past (last 12/2024), iron overload, gout, HTN, and RUE DVT in 12/2024 on eliquis who presented on 3/15 by his hematologist second to anemia and hypoxia, down to 5.3 from baseline 7-8, with concern for vaso-occlusive crisis. While on medicine, anemia improved post transfusion and continued on SCC pain control PCA. Patient noted with left renal mass during prior admission and s/p L ureteroscopy with biopsy and stent placement on 3/19. Course complicated with hypoglycemia on 3/20 with RRT x 2 and found with severe anemia to 3.4 with hemorrhagic shock second to left perirenal and subcapsular hemorrhage and ultimately PEA arrest. ROSC achieved and transferred to MICU. s/p IR emobilization and course complicated anoxic brain injury, myoclonic jerks, GIB, MATA, HAGMA, DVTs (R IJ and LUE brachial), dysphagia, cirrhosis, and prolonged vent time s/p trach on 4/4. Transferred to [facility] on 4/18.     NEUROLOGY  # Anoxic brain injury   - s/p cardiac arrest on 3/20 with ROSC in 4 minutes   - CT HEAD post arrest with diffuse sulcal effacement with increased intracranial pressure, and few patchy foci of cortical blurring concern for HIE  - MRI brain post arrest with with diffuse global abnormal supratentorial cortical restricted diffusion consistent with global hypoxic injury   - Monitor for now    # Seizures vs myoclonic jerks   - Witness myoclonic jerking concerning for seizures with anoxic brain injury  - vEEG with abundant myoclonic seizures in the setting of a severe diffuse/multifocal cerebral dysfunction which can be seen in the setting of sedative effect or due to anoxia  - s/p valium 10 Q4H   - Continue on keppra   - Continue on klonopin 2mg TID   - Continue on dilaudid PRN     # Central fevers   - Persistent fever spikes noted with concern for central fevers   - Bromocriptine started on 4/23, however noted with rise in LFTs and now stopped on 4/27   - Monitor fever curve.     HEENT  # Tongue biting   - Noted with tongue trauma  - s/p bite block replacement by OMFS on 4/23  - Plan for dental mouthguard mold creation on 5/1   - Continue with mouth care     # Thrush   - Continue on nystatin (4/28 - 5/4)   - Continue with mouth care     CARDIOLOGY  # Shock state likely hemorrhagic vs vasoplegia  - Acute renal bleed noted requiring multiple transfusions and pressors in ICU.   - TTE 3/22 with EF 63 with normal LVRVSF with PASP 49  - Weaned off pressors in ICU and complicated by hypertension.   - Continue on norvasc 5 QD   - Monitor BP     # Autonomic vs pain  - Mild HTN and tachypnea noted likely pain induced vs autonomia?   - Continue on dilaudid pushes PRN    PULMONARY  # Respiratory failure   - Presented with SCC and hypoxia likely from anemia with no signs of acute chest, however noted with PEA with hemorrhagic shock post renal bx requiring intubation.   - Prolonged intubation and s/p 7 PORTEX trach on 4/4   - Continue on vent 20/460/6/30 with nebs and chest PT  - Continue on dilaudid PRN for dyssynchrony     GI  # Shock liver vs cirrhosis with sickle cell   - Transaminitis noted in ICU and thought to be second to shock liver likely second to hemorrhagic shock vs cardiac arrest, however LFTs remains elevated with acute on chronic hyperbilirubinemia.   - US ABD suggestive of early cirrhotic changes, cholecystectomy, and CBD 9mm   - CT AP with cirrhosis   - LFTs rising with bromocriptine   - Holding further bromocriptine   - Trend LFTs and bili     # GIB  - Anemia with GIB noted in ICU   - s/p EGD 4/11 with cauterization of gastric ulcer & hemostatic spraying of three duodenal ulcers. Gastric ulcer likely due to NGT trauma.   - Continue on PPI BID   - Monitor stools and HH     # Oropharyngeal dysphagia   - GI unable to place PEG due to hepatomegaly.   - IR unable to place PEG second to no safe window   - s/p open G TUBE placement on 4/18   - Continue on TF    RENAL  # Acute renal failure and HAGMA second to shock state   - Scr on admission was 1.25 (3/15/25) and increased to 6s while in ICU and s/p HD 3/24-3/29 and then again on 4/4.   - L SHILEY removed on 4/15   - Continue on HCO3 1300 TID tabs with improved acidosis, however hypernatremic and stopped.   - Monitor renal function, acidosis, and UOP     # L renal mass bx c/b hematoma   - Renal bx as below on 3/19  - Hemorrhagic shock and PEA arrest on 3/20   - Renal US with large L renal subcapsular hematoma.    - IR left renal angiogram and embolization on 3/20   - CT 4/6 with unchanged large left subcapsular and perinephric hematoma.  - CT A/P (4/20) showing stable subcapsular renal hematoma and NO new active bleeds.  - Monitor for now    # Urinary retention   - Passed TOV on 4/23   - Urinary retention again and likely neurogenic bladder   - Will place daniel today and hold further TOVs    # Hypernatremia  - s/p D5W   - Continue on  Q6H however sodium worsening   - Continue on FWF with no improvement.   - Continue on D5 yesterday, however IV trouble noted and sodium rising today.   - Hold further HCO3 tabs and rehydrate again with D5 and  Q6H   - Monitor sodium     # Hypercalcemia likely from dehydration   - Rehydrate as above and monitor Ca     # Hyperphosphatemia   - Phos elevation likely second to renal failure   - PTH elevated, however Phos slowly down trending   - Monitor phos    INFECTIOUS DISEASE   # Central fevers  - Recurrent fevers and complete LVQ empirically as below   - Started on bromocriptine with improvement in fever curve, however noted with transaminitis and bromocriptine stopped with return of elevated temperature intermittently.   - Patient with known R IJ DVT and L kidney hematoma and now new LLE DVT that can cause intermittent fevers.   - Hold work up for now   - Hold further ABX for now   - Monitor fever curve.     # Questionable UTI  - Recurrent fevers with POSITIVE UA and s/p LVQ (4/23 - 4/28)   - Monitor off ABX     # VAP   - Post arrest noted with concern for aspiration and VAP in ICU   - Flu, SCx, BAL, BCx and UCx negative   - MRSA PCR with MSSA and completed bactroban in ICU   - Completed aztreonam (3/22-3/27) then casey (3/27-3/31) and vanco by dose in ICU   - CXR in ICU with RUL consolidation and completed recurrent casey course in ICU (4/2-4/7).     HEMATOLOGIC  # Anemia with SCC vs L kidney bleed vs GIB   - Baseline hemoglobin outpatient ~6.0-7.0 and sent in for anemia down to 5.3 without signs of bleeding and more likely SCC.    - Transfusion given on admission and improved back to baseline.   - Course complicated by hemorrhagic shock from left renal hematoma and HH down to 3.4.  - s/p 10U PRBC total while in ICU   - s/p PRBC pre-GTUBE with HH increased to 8.0  - c/b rapid decline in HH post GTUBE likely normalizing to baseline 6-7 as RPT CT AP post G TUBE with new bleeds and no acute signs of sickling.   - s/p PRBC 4/22   - Monitor HH     # SCD   - Discontinued deferasirox due to current renal failure   - Monitor Sickle labs QD (CBC with Diff, Retic, BMP, Hepatic Function, LDH and Hapto, and VBG).     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   - Eliquis held outpatient in anticipation for bx   - Eliquis switched to LVX and then held for bx in house   - Course complicated by multiple bleeds in ICU and challenged on heparin GTT  - VA DOPPLER 5/1 with acute LLE Gastrocnemius DVT   - Overall transfusion dependent and no further FULL AC    - Discussed with IR and recommend holding IVF filter for now  - Continue on DVT PPX HSQ and RPT DOPPLER on 5/5  - LUE and LLE precautions     # LUE arm infiltration   - HCO3 GTT infiltration in ICU   - Seen by hand sx and no compartment syndrome concern   - Monitor for now     # RUE blood infiltration   - RUE blood transfusion infiltration noted on 4/17 with area of ecchymosis   - RPT DOPPLER with known R IJ DVT, however no upper arm thrombus or issues in area of infiltration  - Monitor site for now    ENDOCRINE   # Glycemic control   - Monitor BG   - No hx of DM2     SKIN   - Wound care reccs appreciated    ETHICS   - FULL CODE     DISPO - vent facility when able

## 2025-05-01 NOTE — PROGRESS NOTE ADULT - SUBJECTIVE AND OBJECTIVE BOX
RCU PROGRESS NOTE     CHIEF COMPLAINT: Patient is a 44y old  Male who presents with a chief complaint of Referred by Hematologist for low Hg (2025 07:46)      INTERVAL EVENTS:  - Sodium rising despite rehydration.  - Pending dental mold on Friday.     REVIEW OF SYSTEMS: Seen by bedside during AM rounds and unable to assess ROS second HIE    Mode: AC/ CMV (Assist Control/ Continuous Mandatory Ventilation), RR (machine): 20, TV (machine): 460, FiO2: 30, PEEP: 6, ITime: 0.72, MAP: 11, PC: 6, PIP: 23      OBJECTIVE:  ICU Vital Signs Last 24 Hrs  T(C): 37 (2025 05:25), Max: 37.9 (2025 16:10)  T(F): 98.6 (2025 05:25), Max: 100.2 (2025 16:10)  HR: 117 (2025 07:41) (106 - 146)  BP: 134/90 (2025 05:00) (134/90 - 148/93)  BP(mean): --  ABP: --  ABP(mean): --  RR: 20 (2025 04:00) (20 - 30)  SpO2: 100% (2025 07:41) (98% - 100%)    O2 Parameters below as of 2025 04:00  Patient On (Oxygen Delivery Method): ventilator          Mode: AC/ CMV (Assist Control/ Continuous Mandatory Ventilation), RR (machine): 20, TV (machine): 460, FiO2: 30, PEEP: 6, ITime: 0.72, MAP: 11, PC: 6, PIP: 23     @ 07:01  -   @ 07:00  --------------------------------------------------------  IN: 500 mL / OUT: 2050 mL / NET: -1550 mL      CAPILLARY BLOOD GLUCOSE  POCT Blood Glucose.: 126 mg/dL (2025 11:22)      HOSPITAL MEDICATIONS:  MEDICATIONS  (STANDING):  albuterol/ipratropium for Nebulization 3 milliLiter(s) Nebulizer every 6 hours  amLODIPine   Tablet 5 milliGRAM(s) Oral daily  chlorhexidine 0.12% Liquid 15 milliLiter(s) Oral Mucosa two times a day  chlorhexidine 2% Cloths 1 Application(s) Topical daily  clonazePAM  Tablet 2 milliGRAM(s) Oral every 8 hours  heparin   Injectable 5000 Unit(s) SubCutaneous every 12 hours  influenza   Vaccine 0.5 milliLiter(s) IntraMuscular once  levETIRAcetam  Solution 500 milliGRAM(s) Oral two times a day  nystatin    Suspension 818899 Unit(s) Oral four times a day  pantoprazole   Suspension 40 milliGRAM(s) Oral every 12 hours  sodium bicarbonate 1300 milliGRAM(s) Oral three times a day    MEDICATIONS  (PRN):  HYDROmorphone  Injectable 0.5 milliGRAM(s) IV Push every 4 hours PRN dysynchronny      PHYSICAL EXAMINATION  General: NAD   HEENT: Scleral icterus and trach present  Cards: S1/S2, no murmurs   Pulm: Course vent sounds bilaterally. No wheezes.   Abdomen: Soft, nondistended and nontender. PEG (+)   Extremities: No pedal edema. No active SABINO of BL upper and lower extremities.  Neurology: Eyes open, does not follow commands, only noted with bicep activation with noxious stimuli with no acute focal neurological deficits     LABS:                                6.8    19.78 )-----------( 348      ( 2025 06:00 )             21.2         04-30    152[H]  |  119[H]  |  72[H]  ----------------------------<  142[H]  4.6   |  23  |  1.63[H]    Ca    10.3      2025 06:00  Phos  3.3     04-30  Mg     2.40     04-30    TPro  8.1  /  Alb  2.9[L]  /  TBili  2.8[H]  /  DBili  x   /  AST  204[H]  /  ALT  151[H]  /  AlkPhos  777[H]  04-30                    Urinalysis Basic - ( 2025 05:30 )  Color: x / Appearance: x / SG: x / pH: x  Gluc: 139 mg/dL / Ketone: x  / Bili: x / Urobili: x   Blood: x / Protein: x / Nitrite: x   Leuk Esterase: x / RBC: x / WBC x   Sq Epi: x / Non Sq Epi: x / Bacteria: x            PAST MEDICAL & SURGICAL HISTORY:  Sickle cell anemia      Gout      Deep vein thrombosis (DVT)      Iron overload      Hypertension      History of cholecystectomy      History of removal of Port-a-Cath          FAMILY HISTORY:  Family history of sickle cell trait (Father, Mother)    Patient's father is  (Father)    Patient's mother is  (Mother)        Social History:  Lives alone (15 Mar 2025 09:54)      RADIOLOGY:  [ ] Reviewed and interpreted by me    PULMONARY FUNCTION TESTS:    EKG: RCU PROGRESS NOTE     CHIEF COMPLAINT: Patient is a 44y old  Male who presents with a chief complaint of Referred by Hematologist for low Hg (2025 07:46)      INTERVAL EVENTS:  - Sodium rising despite rehydration likely second to IV trouble.  - Pending dental mold on Friday.     REVIEW OF SYSTEMS: Seen by bedside during AM rounds and unable to assess ROS second HIE    Mode: AC/ CMV (Assist Control/ Continuous Mandatory Ventilation), RR (machine): 20, TV (machine): 460, FiO2: 30, PEEP: 6, ITime: 0.72, MAP: 11, PC: 6, PIP: 23      OBJECTIVE:  ICU Vital Signs Last 24 Hrs  T(C): 37 (2025 05:25), Max: 37.9 (2025 16:10)  T(F): 98.6 (2025 05:25), Max: 100.2 (2025 16:10)  HR: 117 (2025 07:41) (106 - 146)  BP: 134/90 (2025 05:00) (134/90 - 148/93)  BP(mean): --  ABP: --  ABP(mean): --  RR: 20 (2025 04:00) (20 - 30)  SpO2: 100% (2025 07:41) (98% - 100%)    O2 Parameters below as of 2025 04:00  Patient On (Oxygen Delivery Method): ventilator          Mode: AC/ CMV (Assist Control/ Continuous Mandatory Ventilation), RR (machine): 20, TV (machine): 460, FiO2: 30, PEEP: 6, ITime: 0.72, MAP: 11, PC: 6, PIP: 23     @ 07:01  -   @ 07:00  --------------------------------------------------------  IN: 500 mL / OUT: 2050 mL / NET: -1550 mL      CAPILLARY BLOOD GLUCOSE  POCT Blood Glucose.: 126 mg/dL (2025 11:22)      HOSPITAL MEDICATIONS:  MEDICATIONS  (STANDING):  albuterol/ipratropium for Nebulization 3 milliLiter(s) Nebulizer every 6 hours  amLODIPine   Tablet 5 milliGRAM(s) Oral daily  chlorhexidine 0.12% Liquid 15 milliLiter(s) Oral Mucosa two times a day  chlorhexidine 2% Cloths 1 Application(s) Topical daily  clonazePAM  Tablet 2 milliGRAM(s) Oral every 8 hours  heparin   Injectable 5000 Unit(s) SubCutaneous every 12 hours  influenza   Vaccine 0.5 milliLiter(s) IntraMuscular once  levETIRAcetam  Solution 500 milliGRAM(s) Oral two times a day  nystatin    Suspension 217526 Unit(s) Oral four times a day  pantoprazole   Suspension 40 milliGRAM(s) Oral every 12 hours  sodium bicarbonate 1300 milliGRAM(s) Oral three times a day    MEDICATIONS  (PRN):  HYDROmorphone  Injectable 0.5 milliGRAM(s) IV Push every 4 hours PRN dysynchronny      PHYSICAL EXAMINATION  General: NAD   HEENT: Scleral icterus and trach present  Cards: S1/S2, no murmurs   Pulm: Course vent sounds bilaterally. No wheezes.   Abdomen: Soft, nondistended and nontender. PEG (+)   Extremities: No pedal edema. No active SABINO of BL upper and lower extremities.  Neurology: Eyes open, does not follow commands, only noted with bicep activation with noxious stimuli with no acute focal neurological deficits     LABS:                                6.8    19.78 )-----------( 348      ( 2025 06:00 )             21.2         04-30    152[H]  |  119[H]  |  72[H]  ----------------------------<  142[H]  4.6   |  23  |  1.63[H]    Ca    10.3      2025 06:00  Phos  3.3     04-30  Mg     2.40     04-30    TPro  8.1  /  Alb  2.9[L]  /  TBili  2.8[H]  /  DBili  x   /  AST  204[H]  /  ALT  151[H]  /  AlkPhos  777[H]  04-30                    Urinalysis Basic - ( 2025 05:30 )  Color: x / Appearance: x / SG: x / pH: x  Gluc: 139 mg/dL / Ketone: x  / Bili: x / Urobili: x   Blood: x / Protein: x / Nitrite: x   Leuk Esterase: x / RBC: x / WBC x   Sq Epi: x / Non Sq Epi: x / Bacteria: x            PAST MEDICAL & SURGICAL HISTORY:  Sickle cell anemia      Gout      Deep vein thrombosis (DVT)      Iron overload      Hypertension      History of cholecystectomy      History of removal of Port-a-Cath          FAMILY HISTORY:  Family history of sickle cell trait (Father, Mother)    Patient's father is  (Father)    Patient's mother is  (Mother)        Social History:  Lives alone (15 Mar 2025 09:54)      RADIOLOGY:  [ ] Reviewed and interpreted by me    PULMONARY FUNCTION TESTS:    EKG:

## 2025-05-02 ENCOUNTER — APPOINTMENT (OUTPATIENT)
Age: 45
End: 2025-05-02
Payer: MEDICAID

## 2025-05-02 LAB
ALBUMIN SERPL ELPH-MCNC: 2.9 G/DL — LOW (ref 3.3–5)
ALP SERPL-CCNC: 675 U/L — HIGH (ref 40–120)
ALT FLD-CCNC: 126 U/L — HIGH (ref 4–41)
ANION GAP SERPL CALC-SCNC: 11 MMOL/L — SIGNIFICANT CHANGE UP (ref 7–14)
ANION GAP SERPL CALC-SCNC: 12 MMOL/L — SIGNIFICANT CHANGE UP (ref 7–14)
AST SERPL-CCNC: 136 U/L — HIGH (ref 4–40)
BASOPHILS # BLD AUTO: 0.14 K/UL — SIGNIFICANT CHANGE UP (ref 0–0.2)
BASOPHILS NFR BLD AUTO: 0.7 % — SIGNIFICANT CHANGE UP (ref 0–2)
BILIRUB SERPL-MCNC: 2.5 MG/DL — HIGH (ref 0.2–1.2)
BUN SERPL-MCNC: 73 MG/DL — HIGH (ref 7–23)
BUN SERPL-MCNC: 74 MG/DL — HIGH (ref 7–23)
CALCIUM SERPL-MCNC: 10.4 MG/DL — SIGNIFICANT CHANGE UP (ref 8.4–10.5)
CALCIUM SERPL-MCNC: 10.5 MG/DL — SIGNIFICANT CHANGE UP (ref 8.4–10.5)
CHLORIDE SERPL-SCNC: 115 MMOL/L — HIGH (ref 98–107)
CHLORIDE SERPL-SCNC: 117 MMOL/L — HIGH (ref 98–107)
CO2 SERPL-SCNC: 22 MMOL/L — SIGNIFICANT CHANGE UP (ref 22–31)
CO2 SERPL-SCNC: 22 MMOL/L — SIGNIFICANT CHANGE UP (ref 22–31)
CREAT SERPL-MCNC: 1.58 MG/DL — HIGH (ref 0.5–1.3)
CREAT SERPL-MCNC: 1.59 MG/DL — HIGH (ref 0.5–1.3)
EGFR: 54 ML/MIN/1.73M2 — LOW
EGFR: 54 ML/MIN/1.73M2 — LOW
EGFR: 55 ML/MIN/1.73M2 — LOW
EGFR: 55 ML/MIN/1.73M2 — LOW
EOSINOPHIL # BLD AUTO: 0.65 K/UL — HIGH (ref 0–0.5)
EOSINOPHIL NFR BLD AUTO: 3.4 % — SIGNIFICANT CHANGE UP (ref 0–6)
GLUCOSE BLDC GLUCOMTR-MCNC: 112 MG/DL — HIGH (ref 70–99)
GLUCOSE BLDC GLUCOMTR-MCNC: 147 MG/DL — HIGH (ref 70–99)
GLUCOSE BLDC GLUCOMTR-MCNC: 171 MG/DL — HIGH (ref 70–99)
GLUCOSE SERPL-MCNC: 152 MG/DL — HIGH (ref 70–99)
GLUCOSE SERPL-MCNC: 157 MG/DL — HIGH (ref 70–99)
HAPTOGLOB SERPL-MCNC: 32 MG/DL — LOW (ref 34–200)
HCT VFR BLD CALC: 20.9 % — CRITICAL LOW (ref 39–50)
HGB BLD-MCNC: 6.7 G/DL — CRITICAL LOW (ref 13–17)
IANC: 14.52 K/UL — HIGH (ref 1.8–7.4)
IMM GRANULOCYTES NFR BLD AUTO: 0.9 % — SIGNIFICANT CHANGE UP (ref 0–0.9)
LDH SERPL L TO P-CCNC: 234 U/L — HIGH (ref 135–225)
LYMPHOCYTES # BLD AUTO: 13 % — SIGNIFICANT CHANGE UP (ref 13–44)
LYMPHOCYTES # BLD AUTO: 2.51 K/UL — SIGNIFICANT CHANGE UP (ref 1–3.3)
MAGNESIUM SERPL-MCNC: 2.3 MG/DL — SIGNIFICANT CHANGE UP (ref 1.6–2.6)
MAGNESIUM SERPL-MCNC: 2.4 MG/DL — SIGNIFICANT CHANGE UP (ref 1.6–2.6)
MCHC RBC-ENTMCNC: 30.3 PG — SIGNIFICANT CHANGE UP (ref 27–34)
MCHC RBC-ENTMCNC: 32.1 G/DL — SIGNIFICANT CHANGE UP (ref 32–36)
MCV RBC AUTO: 94.6 FL — SIGNIFICANT CHANGE UP (ref 80–100)
MONOCYTES # BLD AUTO: 1.34 K/UL — HIGH (ref 0–0.9)
MONOCYTES NFR BLD AUTO: 6.9 % — SIGNIFICANT CHANGE UP (ref 2–14)
NEUTROPHILS # BLD AUTO: 14.52 K/UL — HIGH (ref 1.8–7.4)
NEUTROPHILS NFR BLD AUTO: 75.1 % — SIGNIFICANT CHANGE UP (ref 43–77)
NRBC # BLD AUTO: 0.04 K/UL — HIGH (ref 0–0)
NRBC # FLD: 0.04 K/UL — HIGH (ref 0–0)
NRBC BLD AUTO-RTO: 0 /100 WBCS — SIGNIFICANT CHANGE UP (ref 0–0)
PHOSPHATE SERPL-MCNC: 3.2 MG/DL — SIGNIFICANT CHANGE UP (ref 2.5–4.5)
PHOSPHATE SERPL-MCNC: 3.3 MG/DL — SIGNIFICANT CHANGE UP (ref 2.5–4.5)
PLATELET # BLD AUTO: 367 K/UL — SIGNIFICANT CHANGE UP (ref 150–400)
POTASSIUM SERPL-MCNC: 3.6 MMOL/L — SIGNIFICANT CHANGE UP (ref 3.5–5.3)
POTASSIUM SERPL-MCNC: 3.7 MMOL/L — SIGNIFICANT CHANGE UP (ref 3.5–5.3)
POTASSIUM SERPL-SCNC: 3.6 MMOL/L — SIGNIFICANT CHANGE UP (ref 3.5–5.3)
POTASSIUM SERPL-SCNC: 3.7 MMOL/L — SIGNIFICANT CHANGE UP (ref 3.5–5.3)
PROT SERPL-MCNC: 7.6 G/DL — SIGNIFICANT CHANGE UP (ref 6–8.3)
RBC # BLD: 2.21 M/UL — LOW (ref 4.2–5.8)
RBC # BLD: 2.21 M/UL — LOW (ref 4.2–5.8)
RBC # FLD: 18 % — HIGH (ref 10.3–14.5)
RETICS #: 99.2 K/UL — SIGNIFICANT CHANGE UP (ref 25–125)
RETICS/RBC NFR: 4.5 % — HIGH (ref 0.5–2.5)
SODIUM SERPL-SCNC: 149 MMOL/L — HIGH (ref 135–145)
SODIUM SERPL-SCNC: 150 MMOL/L — HIGH (ref 135–145)
WBC # BLD: 19.34 K/UL — HIGH (ref 3.8–10.5)
WBC # FLD AUTO: 19.34 K/UL — HIGH (ref 3.8–10.5)

## 2025-05-02 PROCEDURE — D0140: CPT

## 2025-05-02 PROCEDURE — D9945: CPT

## 2025-05-02 RX ORDER — SODIUM CHLORIDE 9 G/1000ML
1000 INJECTION, SOLUTION INTRAVENOUS
Refills: 0 | Status: DISCONTINUED | OUTPATIENT
Start: 2025-05-02 | End: 2025-05-03

## 2025-05-02 RX ADMIN — IPRATROPIUM BROMIDE AND ALBUTEROL SULFATE 3 MILLILITER(S): .5; 2.5 SOLUTION RESPIRATORY (INHALATION) at 15:34

## 2025-05-02 RX ADMIN — HEPARIN SODIUM 5000 UNIT(S): 1000 INJECTION INTRAVENOUS; SUBCUTANEOUS at 17:10

## 2025-05-02 RX ADMIN — Medication 0.5 MILLIGRAM(S): at 05:51

## 2025-05-02 RX ADMIN — AMLODIPINE BESYLATE 5 MILLIGRAM(S): 10 TABLET ORAL at 05:00

## 2025-05-02 RX ADMIN — IPRATROPIUM BROMIDE AND ALBUTEROL SULFATE 3 MILLILITER(S): .5; 2.5 SOLUTION RESPIRATORY (INHALATION) at 22:06

## 2025-05-02 RX ADMIN — CLONAZEPAM 2 MILLIGRAM(S): 0.5 TABLET ORAL at 13:11

## 2025-05-02 RX ADMIN — CLONAZEPAM 2 MILLIGRAM(S): 0.5 TABLET ORAL at 05:00

## 2025-05-02 RX ADMIN — Medication 500000 UNIT(S): at 05:01

## 2025-05-02 RX ADMIN — Medication 500000 UNIT(S): at 00:30

## 2025-05-02 RX ADMIN — LEVETIRACETAM 500 MILLIGRAM(S): 10 INJECTION, SOLUTION INTRAVENOUS at 05:00

## 2025-05-02 RX ADMIN — Medication 15 MILLILITER(S): at 17:09

## 2025-05-02 RX ADMIN — SODIUM CHLORIDE 100 MILLILITER(S): 9 INJECTION, SOLUTION INTRAVENOUS at 13:12

## 2025-05-02 RX ADMIN — IPRATROPIUM BROMIDE AND ALBUTEROL SULFATE 3 MILLILITER(S): .5; 2.5 SOLUTION RESPIRATORY (INHALATION) at 03:59

## 2025-05-02 RX ADMIN — Medication 0.5 MILLIGRAM(S): at 23:46

## 2025-05-02 RX ADMIN — HEPARIN SODIUM 5000 UNIT(S): 1000 INJECTION INTRAVENOUS; SUBCUTANEOUS at 05:00

## 2025-05-02 RX ADMIN — Medication 1 APPLICATION(S): at 11:26

## 2025-05-02 RX ADMIN — Medication 40 MILLIGRAM(S): at 17:10

## 2025-05-02 RX ADMIN — Medication 500000 UNIT(S): at 23:46

## 2025-05-02 RX ADMIN — SODIUM CHLORIDE 100 MILLILITER(S): 9 INJECTION, SOLUTION INTRAVENOUS at 11:44

## 2025-05-02 RX ADMIN — Medication 500000 UNIT(S): at 11:26

## 2025-05-02 RX ADMIN — LEVETIRACETAM 500 MILLIGRAM(S): 10 INJECTION, SOLUTION INTRAVENOUS at 17:09

## 2025-05-02 RX ADMIN — Medication 15 MILLILITER(S): at 05:01

## 2025-05-02 RX ADMIN — Medication 40 MILLIGRAM(S): at 05:00

## 2025-05-02 RX ADMIN — IPRATROPIUM BROMIDE AND ALBUTEROL SULFATE 3 MILLILITER(S): .5; 2.5 SOLUTION RESPIRATORY (INHALATION) at 08:27

## 2025-05-02 RX ADMIN — Medication 500000 UNIT(S): at 17:10

## 2025-05-02 RX ADMIN — CLONAZEPAM 2 MILLIGRAM(S): 0.5 TABLET ORAL at 21:18

## 2025-05-02 NOTE — CONSULT NOTE ADULT - PROVIDER SPECIALTY LIST ADULT
Palliative Care
Urology
Vascular Surgery
Gastroenterology
Heme/Onc
Intervent Radiology
Orthopedics
Surgery
Dental
Intervent Radiology
Intervent Pulmonology
Intervent Radiology
Neurology
OMFS
Nephrology

## 2025-05-02 NOTE — CONSULT NOTE ADULT - CONSULT REQUESTED BY NAME
ED
Dr. Yuan
MICU
RCU
MICU team
Med team
Medicine
Zaid Bartholomew MD
MICU
May Tan
Roscoe, Geraldine

## 2025-05-02 NOTE — ADVANCED PRACTICE NURSE CONSULT - ASSESSMENT
Patient is A&Ox0. Patient with yellow sclera, Ventilator dependent, + tracheostomy, peristomal skin intact. Bedbound, incontinent of urine and stool. Patient at risk for moisture/incontinence associated dermatitis to b/l buttocks. Indwelling daniel catheter in place. Skin jaundiced, warm, dry with increased moisture in intertriginous folds, generalized edema, scattered areas of hyperpigmentation and hypopigmentation. Right ear with hypopigmentation and dry, flakey skin. Left ear with dry flakey skin, hypopigmentation, and dried serosanguinous scab. LUQ PEG, peritubular skin intact.     Vascular of b/l lower extremities: Bilateral lower extremities with scattered areas of hyper and hypopigmentation. Dry, flaky skin. No temperature changes noted.  Capillary refill less than 3 seconds. BL LE warm to touch, +2 edema to LLE, +3 palpable DP/PT pulses. Hyperpigmentation noted to left heel.   Right plantar foot: Previously with scattered areas of edema bullae, largest measuring [id number], serous filled - now presenting as dried, serosanguinous scabbing measuring [id number]. Periwound with no erythema, no increased warmth, no edema, no induration, no fluctuance no signs or symptoms of overt skin infection. Goals of care: monitor for tissue type changes, antimicrobial support, continue measures to decrease friction/shear/pressure.   Left arm: previously scabbed over blister; now with re-epithelization noted.  Patient is A&Ox0. Patient with yellow sclera, Ventilator dependent, + tracheostomy, peristomal skin intact. Bedbound, incontinent of urine and stool. Patient at risk for moisture/incontinence associated dermatitis to b/l buttocks. Indwelling daniel catheter in place. Skin jaundiced, warm, dry with increased moisture in intertriginous folds, generalized edema, scattered areas of hyperpigmentation and hypopigmentation. Right ear with hypopigmentation and dry, flakey skin. Left ear with dry flakey skin, hypopigmentation, and deroofed scab exposing pink dermis. LUQ PEG, peritubular skin intact.     Vascular of b/l lower extremities: Bilateral lower extremities with scattered areas of hyper and hypopigmentation. Dry, flaky skin. No temperature changes noted.  Capillary refill less than 3 seconds. BL LE warm to touch, +2 edema to LLE, +3 palpable DP/PT pulses. Hyperpigmentation noted to left heel.   Right plantar foot: Previously with scattered areas of edema bullae, largest measuring [id number], serous filled - now presenting as dried, serosanguinous scabbing measuring [id number]. Periwound with no erythema, no increased warmth, no edema, no induration, no fluctuance no signs or symptoms of overt skin infection. Goals of care: monitor for tissue type changes, antimicrobial support, continue measures to decrease friction/shear/pressure.   Left arm: previously scabbed over blister; now with re-epithelization noted.  Patient is A&Ox0. Patient with yellow sclera, Ventilator dependent, + tracheostomy, peristomal skin intact. Bedbound, incontinent of urine and stool. Patient at risk for moisture/incontinence associated dermatitis to b/l buttocks. Indwelling daniel catheter in place. Skin jaundiced, warm, dry with increased moisture in intertriginous folds, generalized edema, scattered areas of hyperpigmentation and hypopigmentation. Right ear with hypopigmentation and dry, flakey skin. Left ear with dry flakey skin, hypopigmentation, and deroofed scab exposing pink dermis (2.5cmx0.5cmx0.1cm). LUQ PEG, peritubular skin intact.     Vascular of b/l lower extremities: Bilateral lower extremities with scattered areas of hyper and hypopigmentation. Dry, flaky skin. No temperature changes noted.  Capillary refill less than 3 seconds. BL LE warm to touch, +2 edema to LLE, +3 palpable DP/PT pulses. Hyperpigmentation noted to left heel.   Right plantar foot: Previously with scattered areas of edema bullae, largest measuring [id number], serous filled - now presenting as dried, serosanguinous scabbing measuring [id number]. Periwound with no erythema, no increased warmth, no edema, no induration, no fluctuance no signs or symptoms of overt skin infection. Goals of care: monitor for tissue type changes, antimicrobial support, continue measures to decrease friction/shear/pressure.   Left arm: previously scabbed over blister; now with re-epithelization noted.

## 2025-05-02 NOTE — CONSULT NOTE ADULT - CONSULT REQUESTED DATE/TIME
04-Apr-2025 17:08
02-May-2025
01-May-2025 10:43
20-Mar-2025 22:50
26-Mar-2025 12:54
26-Mar-2025 15:50
04-Apr-2025 16:39
15-Mar-2025 10:10
20-Mar-2025 15:49
21-Mar-2025 00:58
23-Apr-2025
02-Apr-2025 13:49
16-Mar-2025 13:39
07-Apr-2025 15:46
24-Mar-2025 08:54

## 2025-05-02 NOTE — PROGRESS NOTE ADULT - SUBJECTIVE AND OBJECTIVE BOX
CHIEF COMPLAINT: Patient is a 44y old  Male who presents with a chief complaint of Referred by Hematologist for low Hg (01 May 2025 10:42)      INTERVAL EVENTS:   - Na+ improving on D5 fluids    ROS: Seen by bedside during AM rounds     OBJECTIVE:  ICU Vital Signs Last 24 Hrs  T(C): 36.9 (02 May 2025 05:00), Max: 36.9 (02 May 2025 00:00)  T(F): 98.4 (02 May 2025 05:00), Max: 98.4 (02 May 2025 00:00)  HR: 112 (02 May 2025 05:00) (98 - 119)  BP: 139/88 (02 May 2025 05:00) (139/88 - 158/75)  BP(mean): --  ABP: --  ABP(mean): --  RR: 20 (02 May 2025 05:00) (20 - 20)  SpO2: 100% (02 May 2025 05:00) (97% - 100%)    O2 Parameters below as of 02 May 2025 05:00  Patient On (Oxygen Delivery Method): ventilator    O2 Concentration (%): 30      Mode: AC/ CMV (Assist Control/ Continuous Mandatory Ventilation), RR (machine): 20, TV (machine): 460, FiO2: 70, PEEP: 6, MAP: 10, PIP: 21    04-30 @ 07:01 - 05-01 @ 07:00  --------------------------------------------------------  IN: 400 mL / OUT: 800 mL / NET: -400 mL    05-01 @ 07:01  -  05-02 @ 06:50  --------------------------------------------------------  IN: 2670 mL / OUT: 1000 mL / NET: 1670 mL      CAPILLARY BLOOD GLUCOSE      POCT Blood Glucose.: 171 mg/dL (01 May 2025 23:59)      PHYSICAL EXAM:  General:   HEENT:   Lymph Nodes:  Neck:   Respiratory:   Cardiovascular:   Abdomen:   Extremities:   Skin:   Neurological:  Psychiatry:    Mode: AC/ CMV (Assist Control/ Continuous Mandatory Ventilation)  RR (machine): 20  TV (machine): 460  FiO2: 70  PEEP: 6  MAP: 10  PIP: 21      HOSPITAL MEDICATIONS:  MEDICATIONS  (STANDING):  albuterol/ipratropium for Nebulization 3 milliLiter(s) Nebulizer every 6 hours  amLODIPine   Tablet 5 milliGRAM(s) Oral daily  chlorhexidine 0.12% Liquid 15 milliLiter(s) Oral Mucosa two times a day  chlorhexidine 2% Cloths 1 Application(s) Topical daily  clonazePAM  Tablet 2 milliGRAM(s) Oral every 8 hours  dextrose 5%. 1000 milliLiter(s) (100 mL/Hr) IV Continuous <Continuous>  heparin   Injectable 5000 Unit(s) SubCutaneous every 12 hours  influenza   Vaccine 0.5 milliLiter(s) IntraMuscular once  levETIRAcetam  Solution 500 milliGRAM(s) Oral two times a day  nystatin    Suspension 926690 Unit(s) Oral four times a day  pantoprazole   Suspension 40 milliGRAM(s) Oral every 12 hours    MEDICATIONS  (PRN):  acetaminophen   Oral Liquid .. 650 milliGRAM(s) Oral every 6 hours PRN Temp greater or equal to 38C (100.4F), Mild Pain (1 - 3)  HYDROmorphone  Injectable 0.5 milliGRAM(s) IV Push every 4 hours PRN dysynchronny      LABS:                        7.0    18.90 )-----------( 363      ( 01 May 2025 05:56 )             21.8     05-02    150[H]  |  117[H]  |  73[H]  ----------------------------<  157[H]  3.7   |  22  |  1.59[H]    Ca    10.4      02 May 2025 00:00  Phos  3.3     05-02  Mg     2.40     05-02    TPro  8.1  /  Alb  2.9[L]  /  TBili  2.7[H]  /  DBili  x   /  AST  166[H]  /  ALT  145[H]  /  AlkPhos  750[H]  05-01      Urinalysis Basic - ( 02 May 2025 00:00 )    Color: x / Appearance: x / SG: x / pH: x  Gluc: 157 mg/dL / Ketone: x  / Bili: x / Urobili: x   Blood: x / Protein: x / Nitrite: x   Leuk Esterase: x / RBC: x / WBC x   Sq Epi: x / Non Sq Epi: x / Bacteria: x        Venous Blood Gas:  05-01 @ 06:35  7.35/47/108/26/98.7  VBG Lactate: 2.3   CHIEF COMPLAINT: Patient is a 44y old  Male who presents with a chief complaint of Referred by Hematologist for low Hg (01 May 2025 10:42)      INTERVAL EVENTS:   - Hgb 6.7 from 7.0, now active signs of bleeding, vital signs hemodynamically stable   - Na+ improving on D5 fluids    ROS: Seen by bedside during AM rounds, unable to obtain due to ventilator    OBJECTIVE:  ICU Vital Signs Last 24 Hrs  T(C): 36.9 (02 May 2025 05:00), Max: 36.9 (02 May 2025 00:00)  T(F): 98.4 (02 May 2025 05:00), Max: 98.4 (02 May 2025 00:00)  HR: 112 (02 May 2025 05:00) (98 - 119)  BP: 139/88 (02 May 2025 05:00) (139/88 - 158/75)  BP(mean): --  ABP: --  ABP(mean): --  RR: 20 (02 May 2025 05:00) (20 - 20)  SpO2: 100% (02 May 2025 05:00) (97% - 100%)    O2 Parameters below as of 02 May 2025 05:00  Patient On (Oxygen Delivery Method): ventilator    O2 Concentration (%): 30      Mode: AC/ CMV (Assist Control/ Continuous Mandatory Ventilation), RR (machine): 20, TV (machine): 460, FiO2: 70, PEEP: 6, MAP: 10, PIP: 21    04-30 @ 07:01 - 05-01 @ 07:00  --------------------------------------------------------  IN: 400 mL / OUT: 800 mL / NET: -400 mL    05-01 @ 07:01  -  05-02 @ 06:50  --------------------------------------------------------  IN: 2670 mL / OUT: 1000 mL / NET: 1670 mL      CAPILLARY BLOOD GLUCOSE      POCT Blood Glucose.: 171 mg/dL (01 May 2025 23:59)      PHYSICAL EXAM:  General: NAD, on ventilator  Neck: neck supple, no JVD, tracheostomy midline   Respiratory: +coarse breath sounds b/l, no rales, or wheeze, normal respiratory effort  Cardiovascular: +sinus tachycardia  Abdomen: soft, non tender, +PEG  Extremities: no LE edema b/l  Skin: warm, dry   Neurological: AAO x 0, eyes are open but does not track or follow commands  Psychiatry: no agitation on exam    Mode: AC/ CMV (Assist Control/ Continuous Mandatory Ventilation)  RR (machine): 20  TV (machine): 460  FiO2: 70  PEEP: 6  MAP: 10  PIP: 21      HOSPITAL MEDICATIONS:  MEDICATIONS  (STANDING):  albuterol/ipratropium for Nebulization 3 milliLiter(s) Nebulizer every 6 hours  amLODIPine   Tablet 5 milliGRAM(s) Oral daily  chlorhexidine 0.12% Liquid 15 milliLiter(s) Oral Mucosa two times a day  chlorhexidine 2% Cloths 1 Application(s) Topical daily  clonazePAM  Tablet 2 milliGRAM(s) Oral every 8 hours  dextrose 5%. 1000 milliLiter(s) (100 mL/Hr) IV Continuous <Continuous>  heparin   Injectable 5000 Unit(s) SubCutaneous every 12 hours  influenza   Vaccine 0.5 milliLiter(s) IntraMuscular once  levETIRAcetam  Solution 500 milliGRAM(s) Oral two times a day  nystatin    Suspension 153952 Unit(s) Oral four times a day  pantoprazole   Suspension 40 milliGRAM(s) Oral every 12 hours    MEDICATIONS  (PRN):  acetaminophen   Oral Liquid .. 650 milliGRAM(s) Oral every 6 hours PRN Temp greater or equal to 38C (100.4F), Mild Pain (1 - 3)  HYDROmorphone  Injectable 0.5 milliGRAM(s) IV Push every 4 hours PRN dysynchronny      LABS:                        7.0    18.90 )-----------( 363      ( 01 May 2025 05:56 )             21.8     05-02    150[H]  |  117[H]  |  73[H]  ----------------------------<  157[H]  3.7   |  22  |  1.59[H]    Ca    10.4      02 May 2025 00:00  Phos  3.3     05-02  Mg     2.40     05-02    TPro  8.1  /  Alb  2.9[L]  /  TBili  2.7[H]  /  DBili  x   /  AST  166[H]  /  ALT  145[H]  /  AlkPhos  750[H]  05-01      Urinalysis Basic - ( 02 May 2025 00:00 )    Color: x / Appearance: x / SG: x / pH: x  Gluc: 157 mg/dL / Ketone: x  / Bili: x / Urobili: x   Blood: x / Protein: x / Nitrite: x   Leuk Esterase: x / RBC: x / WBC x   Sq Epi: x / Non Sq Epi: x / Bacteria: x        Venous Blood Gas:  05-01 @ 06:35  7.35/47/108/26/98.7  VBG Lactate: 2.3

## 2025-05-02 NOTE — ADVANCED PRACTICE NURSE CONSULT - RECOMMEDATIONS
Topical recommendations:   --- Apply Sween 24 Moisturizer to b/l UE and LE daily, avoiding in between the toes.   --- Right plantar foot: Cleanse with NS, pat dry. Paint with Betadine and allow to dry. Apply daily and PRN if soiled.   --- Tracheostomy: Cleanse around trach site with NS. Pat dry. Apply Silicone foam dressing without border beneath trach collar, cut mid dressing to "Y" shape. Change foam dressing every shift and prn. Change trach ties every 3 days.   --- PEG: Cleanse q shift with NS, apply liquid barrier film beneath beatris disc.  If redness noted under beatris disc bumper apply thin foam dressing without border (Mepilex Lite)- cut to mid dressing with a 'Y' shape. Secondary securement to abdomen with paper tape or securement device.   ---B/l buttocks: Cleanse with skin cleanser, pat dry. Apply Jaqcues moisture barrier cream twice a day and PRN with incontinent episodes.   --- While inpatient continue low air loss bed therapy, continue heel elevation, continue to turn & reposition per protocol, soft pillow between bony prominences, continue moisture management with barrier creams & single breathable pad, continue measures to decrease friction/shear/pressure. Continue with nutritional support as per dietary/orders.     Plan discussed with   Please contact Wound/Ostomy Care Service Line if we can be of further assistance (ext [phone]). Please reconsult if changes to tissue type noted.  Topical recommendations:   --- Apply Sween 24 Moisturizer to b/l UE and LE daily, avoiding in between the toes.   --- Right plantar foot: Cleanse with NS, pat dry. Paint with Betadine and allow to dry. Apply daily and PRN if soiled.   --- Tracheostomy: Cleanse around trach site with NS. Pat dry. Apply Silicone foam dressing without border beneath trach collar, cut mid dressing to "Y" shape. Change foam dressing every shift and prn. Change trach ties every 3 days.   --- PEG: Cleanse q shift with NS, apply liquid barrier film beneath beatris disc.  If redness noted under beatris disc bumper apply thin foam dressing without border (Mepilex Lite)- cut to mid dressing with a 'Y' shape. Secondary securement to abdomen with paper tape or securement device.   ---B/L ears: Cleanse with NS, pat dry. Apply Liquid barrier film to periwound skin (allow to dry). Cover with small silicone foam with border. Change every other day and PRN if soiled.   ---B/l buttocks: Cleanse with skin cleanser, pat dry. Apply Jacques moisture barrier cream twice a day and PRN with incontinent episodes.   --- While inpatient continue low air loss bed therapy, continue heel elevation, continue to turn & reposition per protocol, soft pillow between bony prominences, continue moisture management with barrier creams & single breathable pad, continue measures to decrease friction/shear/pressure. Continue with nutritional support as per dietary/orders.     Plan discussed with   Please contact Wound/Ostomy Care Service Line if we can be of further assistance (ext [phone]). Please reconsult if changes to tissue type noted.  Topical recommendations:   --- Apply Sween 24 Moisturizer to b/l UE and LE daily, avoiding in between the toes.   --- Right plantar foot: Cleanse with NS, pat dry. Paint with Betadine and allow to dry. Apply daily and PRN if soiled.   --- Tracheostomy: Cleanse around trach site with NS. Pat dry. Apply Silicone foam dressing without border beneath trach collar, cut mid dressing to "Y" shape. Change foam dressing every shift and prn. Change trach ties every 3 days.   --- PEG: Cleanse q shift with NS, apply liquid barrier film beneath beatris disc.  If redness noted under beatris disc bumper apply thin foam dressing without border (Mepilex Lite)- cut to mid dressing with a 'Y' shape. Secondary securement to abdomen with paper tape or securement device.   ---B/L ears: Cleanse with NS, pat dry. Apply Liquid barrier film to periwound skin (allow to dry). Cover with small silicone foam with border. Change every other day and PRN if soiled.   ---B/l buttocks: Cleanse with skin cleanser, pat dry. Apply Jacques moisture barrier cream twice a day and PRN with incontinent episodes.   --- While inpatient continue low air loss bed therapy, continue heel elevation, continue to turn & reposition per protocol, soft pillow between bony prominences, continue moisture management with barrier creams & single breathable pad, continue measures to decrease friction/shear/pressure. Continue with nutritional support as per dietary/orders.     Plan discussed with Kan Jackson (PA).  Please contact Wound/Ostomy Care Service Line if we can be of further assistance (ext [phone]). Please reconsult if changes to tissue type noted.

## 2025-05-02 NOTE — ADVANCED PRACTICE NURSE CONSULT - REASON FOR CONSULT
Attempted to see patient on Wound Care Rounds, patient off unit at this time. Will follow up with patient for wound care recommendations. 
Patient known to [facility] service line last seen on 4/18/25. Patient seen on skin care rounds after wound care referral received for assessment of skin impairment and recommendations of topical management. As per H&P, patient is a 45 YO M with PMHx of sickle cell disease with prior acute chest syndrome requiring transfusion and exchange in the past (last 12/2024), iron overload, gout, HTN, and RUE DVT in 12/2024 on eliquis who presented on 3/15 by his hematologist second to anemia and hypoxia, down to 5.3 from baseline 7-8, with concern for vaso-occlusive crisis. While on medicine, anemia improved post transfusion and continued on SCC pain control PCA. Patient noted with left renal mass during prior admission and s/p L ureteroscopy with biopsy and stent placement on 3/19. Course complicated with hypoglycemia on 3/20 with RRT x 2 and found with severe anemia to 3.4 with hemorrhagic shock second to left perirenal and subcapsular hemorrhage and ultimately PEA arrest. ROSC achieved and transferred to MICU. s/p IR emobilization and course complicated anoxic brain injury, myoclonic jerks, GIB, MATA, HAGMA, DVTs (R IJ and LUE brachial), dysphagia, cirrhosis, and prolonged vent time s/p trach on 4/4. Transferred to RCU on 4/18. Course c/b tongue occlusive trauma requiring bite block placement now pending Dental eval for mouthguard. Coures also c/b new fever likely 2/2 UTI now on LVQ. Chart reviewed: WBC 19.34, H/H 6.7/20.9, Platelets 367, INR 1.79, Serum albumin 2.9, A1C 5.5, BMI 29.0, Maxwell 11. 
Patient seen on skin care rounds after wound care referral received for assessment of skin impairment and recommendations of topical management. Patient is a 43 YO M with PMHx of sickle cell disease with prior acute chest syndrome requiring transfusion and exchange in the past (last 12/2024), iron overload, gout, HTN, and RUE DVT in 12/2024 on eliquis who presented on 3/15 by his hematologist second to anemia and hypoxia, down to 5.3 from baseline 7-8, with concern for vaso-occlusive crisis. While on medicine, anemia improved post transfusion and continued on SCC pain control PCA. Patient noted with left renal mass during prior admission and s/p L ureteroscopy with biopsy and stent placement on 3/19. Course complicated with hypoglycemia on 3/20 with RRT x 2 and found with severe anemia to 3.4 with hemorrhagic shock second to left perirenal and subcapsular hemorrhage and ultimately PEA arrest. ROSC achieved and transferred to MICU. s/p IR emobilization and course complicated anoxic brain injury, myoclonic jerks, GIB, MATA, HAGMA, DVTs (R IJ and LUE brachial), dysphagia, and prolonged vent time s/p trach on 4/4. Transferred to RCU on 4/18. s/p G tube placement 4/18. Chart reviewed: WBC 19.25, H/H 8.0/24.1, INR 1.79, Platelets 335, BMI 29, adriana 10.     Acute Skin Failure Markers: hypoperfusion in critically ill patient, severe anemia, vasopressors, hemorrhagic shock s/p kidney biopsy s/p 5u pRBC, 3 plasma, 3 plt.

## 2025-05-02 NOTE — PROGRESS NOTE ADULT - NS ATTEND AMEND GEN_ALL_CORE FT
Quality 111:Pneumonia Vaccination Status For Older Adults: Pneumococcal Vaccination Previously Received Detail Level: Detailed Quality 431: Preventive Care And Screening: Unhealthy Alcohol Use - Screening: Patient not identified as an unhealthy alcohol user when screened for unhealthy alcohol use using a systematic screening method Quality 110: Preventive Care And Screening: Influenza Immunization: Influenza Immunization previously received during influenza season Quality 226: Preventive Care And Screening: Tobacco Use: Screening And Cessation Intervention: Patient screened for tobacco use and is an ex/non-smoker agree with above  pt has been afebrile  Dental appreciated  f/u [facility] wagoner monday  d/c blayne

## 2025-05-02 NOTE — CONSULT NOTE ADULT - REASON FOR ADMISSION
Referred by Hematologist for low Hg
Referred by PMD for low Hg
Referred by Hematologist for low Hg

## 2025-05-02 NOTE — CONSULT NOTE ADULT - SUBJECTIVE AND OBJECTIVE BOX
Dental consulted due to persistent tongue biting.     HPI: 45 YO M with PMHx of sickle cell disease with prior acute chest syndrome requiring transfusion and exchange in the past (last 12/2024), iron overload, gout, HTN, and RUE DVT in 12/2024 on eliquis who presented on 3/15 by his hematologist second to anemia and hypoxia, down to 5.3 from baseline 7-8, with concern for vaso-occlusive crisis. While on medicine, anemia improved post transfusion and continued on SCC pain control PCA. Patient noted with left renal mass during prior admission and s/p L ureteroscopy with biopsy and stent placement on 3/19. Course complicated with hypoglycemia on 3/20 with RRT x 2 and found with severe anemia to 3.4 with hemorrhagic shock second to left perirenal and subcapsular hemorrhage and ultimately PEA arrest. ROSC achieved and transferred to MICU. s/p IR emobilization and course complicated anoxic brain injury, myoclonic jerks, GIB, MATA, HAGMA, DVTs (R IJ and LUE brachial), dysphagia, cirrhosis, and prolonged vent time s/p trach on 4/4. Transferred to RCU on 4/18.   Dental paged fabricate a  due to patients tongue biting.       MEDICATIONS  (STANDING):  albuterol/ipratropium for Nebulization 3 milliLiter(s) Nebulizer every 6 hours  amLODIPine   Tablet 5 milliGRAM(s) Oral daily  chlorhexidine 0.12% Liquid 15 milliLiter(s) Oral Mucosa two times a day  chlorhexidine 2% Cloths 1 Application(s) Topical daily  clonazePAM  Tablet 2 milliGRAM(s) Oral every 8 hours  dextrose 5%. 1000 milliLiter(s) (100 mL/Hr) IV Continuous <Continuous>  heparin   Injectable 5000 Unit(s) SubCutaneous every 12 hours  influenza   Vaccine 0.5 milliLiter(s) IntraMuscular once  levETIRAcetam  Solution 500 milliGRAM(s) Oral two times a day  nystatin    Suspension 441073 Unit(s) Oral four times a day  pantoprazole   Suspension 40 milliGRAM(s) Oral every 12 hours    MEDICATIONS  (PRN):  acetaminophen   Oral Liquid .. 650 milliGRAM(s) Oral every 6 hours PRN Temp greater or equal to 38C (100.4F), Mild Pain (1 - 3)  HYDROmorphone  Injectable 0.5 milliGRAM(s) IV Push every 4 hours PRN dysynchronny    Patient identification confirmed: Yes   Medical history reviewed: Yes     Treatment: Dental consulted due to persistent tongue biting. OMFS previously assessed the pt. and recommended evaluation by dental. Pt. evaluated at bedside. Pt. currently has a bite block in place to prevent tongue biting. Macroglossia noted. Epulis noted at the floor of the mouth. No tongue ulcerations detected. Bite stick and molt prop used to open patient's mouth. Thick salivary secretions noted. Maxillary and mandibular teeth scanned with [employer] scanner for occlusal guard.     Rx: Soft occlusal guard for the maxillary arch to prevent tongue biting. Rx electronically sent to [facility] dental labs. Order scheduled to arrive Friday, May 16th. Contacted lab to request order to be expedited.     If still inpatient, occlusal guard will be delivered to the pt. by the dental team. If pt. is discharged, pt. can schedule an appointment to be seen as outpatient.     [facility]: [address] [place] [address]  Phone: [phone]  Email: [email or web address]     Please consult dental with any questions.       KAITE Nuno DDS

## 2025-05-03 LAB
ALBUMIN SERPL ELPH-MCNC: 2.9 G/DL — LOW (ref 3.3–5)
ALP SERPL-CCNC: 697 U/L — HIGH (ref 40–120)
ALT FLD-CCNC: 124 U/L — HIGH (ref 4–41)
ANION GAP SERPL CALC-SCNC: 13 MMOL/L — SIGNIFICANT CHANGE UP (ref 7–14)
ANISOCYTOSIS BLD QL: SLIGHT — SIGNIFICANT CHANGE UP
AST SERPL-CCNC: 131 U/L — HIGH (ref 4–40)
BASOPHILS # BLD AUTO: 0 K/UL — SIGNIFICANT CHANGE UP (ref 0–0.2)
BASOPHILS NFR BLD AUTO: 0 % — SIGNIFICANT CHANGE UP (ref 0–2)
BILIRUB DIRECT SERPL-MCNC: 1.7 MG/DL — HIGH (ref 0–0.3)
BILIRUB INDIRECT FLD-MCNC: 0.7 MG/DL — SIGNIFICANT CHANGE UP (ref 0–1)
BILIRUB SERPL-MCNC: 2.4 MG/DL — HIGH (ref 0.2–1.2)
BLD GP AB SCN SERPL QL: NEGATIVE — SIGNIFICANT CHANGE UP
BUN SERPL-MCNC: 72 MG/DL — HIGH (ref 7–23)
CALCIUM SERPL-MCNC: 10.6 MG/DL — HIGH (ref 8.4–10.5)
CHLORIDE SERPL-SCNC: 113 MMOL/L — HIGH (ref 98–107)
CO2 SERPL-SCNC: 20 MMOL/L — LOW (ref 22–31)
CREAT SERPL-MCNC: 1.55 MG/DL — HIGH (ref 0.5–1.3)
EGFR: 56 ML/MIN/1.73M2 — LOW
EGFR: 56 ML/MIN/1.73M2 — LOW
EOSINOPHIL # BLD AUTO: 0.64 K/UL — HIGH (ref 0–0.5)
EOSINOPHIL NFR BLD AUTO: 3.4 % — SIGNIFICANT CHANGE UP (ref 0–6)
GAS PNL BLDV: SIGNIFICANT CHANGE UP
GIANT PLATELETS BLD QL SMEAR: PRESENT — SIGNIFICANT CHANGE UP
GLUCOSE BLDC GLUCOMTR-MCNC: 122 MG/DL — HIGH (ref 70–99)
GLUCOSE BLDC GLUCOMTR-MCNC: 157 MG/DL — HIGH (ref 70–99)
GLUCOSE SERPL-MCNC: 163 MG/DL — HIGH (ref 70–99)
HAPTOGLOB SERPL-MCNC: 36 MG/DL — SIGNIFICANT CHANGE UP (ref 34–200)
HCT VFR BLD CALC: 21.3 % — LOW (ref 39–50)
HGB BLD-MCNC: 6.7 G/DL — CRITICAL LOW (ref 13–17)
HYPOCHROMIA BLD QL: SLIGHT — SIGNIFICANT CHANGE UP
IANC: 14.55 K/UL — HIGH (ref 1.8–7.4)
LDH SERPL L TO P-CCNC: 222 U/L — SIGNIFICANT CHANGE UP (ref 135–225)
LYMPHOCYTES # BLD AUTO: 1.76 K/UL — SIGNIFICANT CHANGE UP (ref 1–3.3)
LYMPHOCYTES # BLD AUTO: 9.4 % — LOW (ref 13–44)
MAGNESIUM SERPL-MCNC: 2.3 MG/DL — SIGNIFICANT CHANGE UP (ref 1.6–2.6)
MCHC RBC-ENTMCNC: 29.6 PG — SIGNIFICANT CHANGE UP (ref 27–34)
MCHC RBC-ENTMCNC: 31.5 G/DL — LOW (ref 32–36)
MCV RBC AUTO: 94.2 FL — SIGNIFICANT CHANGE UP (ref 80–100)
MONOCYTES # BLD AUTO: 0.64 K/UL — SIGNIFICANT CHANGE UP (ref 0–0.9)
MONOCYTES NFR BLD AUTO: 3.4 % — SIGNIFICANT CHANGE UP (ref 2–14)
NEUTROPHILS # BLD AUTO: 15.7 K/UL — HIGH (ref 1.8–7.4)
NEUTROPHILS NFR BLD AUTO: 82.9 % — HIGH (ref 43–77)
NEUTS BAND # BLD: 0.9 % — SIGNIFICANT CHANGE UP (ref 0–6)
NEUTS BAND NFR BLD: 0.9 % — SIGNIFICANT CHANGE UP (ref 0–6)
NRBC # BLD: 2 /100 WBCS — HIGH (ref 0–0)
NRBC BLD-RTO: 2 /100 WBCS — HIGH (ref 0–0)
OVALOCYTES BLD QL SMEAR: SLIGHT — SIGNIFICANT CHANGE UP
PHOSPHATE SERPL-MCNC: 3 MG/DL — SIGNIFICANT CHANGE UP (ref 2.5–4.5)
PLAT MORPH BLD: NORMAL — SIGNIFICANT CHANGE UP
PLATELET # BLD AUTO: 333 K/UL — SIGNIFICANT CHANGE UP (ref 150–400)
PLATELET COUNT - ESTIMATE: NORMAL — SIGNIFICANT CHANGE UP
POIKILOCYTOSIS BLD QL AUTO: SLIGHT — SIGNIFICANT CHANGE UP
POTASSIUM SERPL-MCNC: 3.7 MMOL/L — SIGNIFICANT CHANGE UP (ref 3.5–5.3)
POTASSIUM SERPL-SCNC: 3.7 MMOL/L — SIGNIFICANT CHANGE UP (ref 3.5–5.3)
PROT SERPL-MCNC: 7.5 G/DL — SIGNIFICANT CHANGE UP (ref 6–8.3)
RBC # BLD: 2.26 M/UL — LOW (ref 4.2–5.8)
RBC # BLD: 2.26 M/UL — LOW (ref 4.2–5.8)
RBC # FLD: 18.2 % — HIGH (ref 10.3–14.5)
RBC BLD AUTO: ABNORMAL
RETICS #: 98.3 K/UL — SIGNIFICANT CHANGE UP (ref 25–125)
RETICS/RBC NFR: 4.4 % — HIGH (ref 0.5–2.5)
RH IG SCN BLD-IMP: POSITIVE — SIGNIFICANT CHANGE UP
SODIUM SERPL-SCNC: 146 MMOL/L — HIGH (ref 135–145)
WBC # BLD: 18.73 K/UL — HIGH (ref 3.8–10.5)
WBC # FLD AUTO: 18.73 K/UL — HIGH (ref 3.8–10.5)

## 2025-05-03 PROCEDURE — 99233 SBSQ HOSP IP/OBS HIGH 50: CPT

## 2025-05-03 RX ORDER — SODIUM CHLORIDE 9 G/1000ML
1000 INJECTION, SOLUTION INTRAVENOUS
Refills: 0 | Status: DISCONTINUED | OUTPATIENT
Start: 2025-05-03 | End: 2025-05-03

## 2025-05-03 RX ADMIN — CLONAZEPAM 2 MILLIGRAM(S): 0.5 TABLET ORAL at 21:10

## 2025-05-03 RX ADMIN — HEPARIN SODIUM 5000 UNIT(S): 1000 INJECTION INTRAVENOUS; SUBCUTANEOUS at 17:03

## 2025-05-03 RX ADMIN — Medication 15 MILLILITER(S): at 17:03

## 2025-05-03 RX ADMIN — Medication 650 MILLIGRAM(S): at 15:05

## 2025-05-03 RX ADMIN — CLONAZEPAM 2 MILLIGRAM(S): 0.5 TABLET ORAL at 13:05

## 2025-05-03 RX ADMIN — IPRATROPIUM BROMIDE AND ALBUTEROL SULFATE 3 MILLILITER(S): .5; 2.5 SOLUTION RESPIRATORY (INHALATION) at 02:58

## 2025-05-03 RX ADMIN — IPRATROPIUM BROMIDE AND ALBUTEROL SULFATE 3 MILLILITER(S): .5; 2.5 SOLUTION RESPIRATORY (INHALATION) at 19:58

## 2025-05-03 RX ADMIN — HEPARIN SODIUM 5000 UNIT(S): 1000 INJECTION INTRAVENOUS; SUBCUTANEOUS at 05:01

## 2025-05-03 RX ADMIN — Medication 500000 UNIT(S): at 05:02

## 2025-05-03 RX ADMIN — IPRATROPIUM BROMIDE AND ALBUTEROL SULFATE 3 MILLILITER(S): .5; 2.5 SOLUTION RESPIRATORY (INHALATION) at 15:46

## 2025-05-03 RX ADMIN — LEVETIRACETAM 500 MILLIGRAM(S): 10 INJECTION, SOLUTION INTRAVENOUS at 05:01

## 2025-05-03 RX ADMIN — Medication 500000 UNIT(S): at 21:11

## 2025-05-03 RX ADMIN — Medication 500000 UNIT(S): at 17:03

## 2025-05-03 RX ADMIN — Medication 500000 UNIT(S): at 09:37

## 2025-05-03 RX ADMIN — IPRATROPIUM BROMIDE AND ALBUTEROL SULFATE 3 MILLILITER(S): .5; 2.5 SOLUTION RESPIRATORY (INHALATION) at 08:30

## 2025-05-03 RX ADMIN — Medication 15 MILLILITER(S): at 05:01

## 2025-05-03 RX ADMIN — SODIUM CHLORIDE 100 MILLILITER(S): 9 INJECTION, SOLUTION INTRAVENOUS at 14:19

## 2025-05-03 RX ADMIN — CLONAZEPAM 2 MILLIGRAM(S): 0.5 TABLET ORAL at 05:01

## 2025-05-03 RX ADMIN — AMLODIPINE BESYLATE 5 MILLIGRAM(S): 10 TABLET ORAL at 05:01

## 2025-05-03 RX ADMIN — Medication 40 MILLIGRAM(S): at 17:03

## 2025-05-03 RX ADMIN — LEVETIRACETAM 500 MILLIGRAM(S): 10 INJECTION, SOLUTION INTRAVENOUS at 17:03

## 2025-05-03 RX ADMIN — Medication 650 MILLIGRAM(S): at 14:35

## 2025-05-03 RX ADMIN — Medication 40 MILLIGRAM(S): at 05:02

## 2025-05-03 RX ADMIN — Medication 1 APPLICATION(S): at 09:37

## 2025-05-03 NOTE — PROGRESS NOTE ADULT - NS ATTEND AMEND GEN_ALL_CORE FT
agree with above  has remained afebrile  hemodyn stable  hypernatremia improving  repeat doppler tomorrow to eval if need filter  d/c planning

## 2025-05-03 NOTE — PROGRESS NOTE ADULT - ASSESSMENT
43 YO M with PMHx of sickle cell disease with prior acute chest syndrome requiring transfusion and exchange in the past (last 12/2024), iron overload, gout, HTN, and RUE DVT in 12/2024 on eliquis who presented on 3/15 by his hematologist second to anemia and hypoxia, down to 5.3 from baseline 7-8, with concern for vaso-occlusive crisis. While on medicine, anemia improved post transfusion and continued on SCC pain control PCA. Patient noted with left renal mass during prior admission and s/p L ureteroscopy with biopsy and stent placement on 3/19. Course complicated with hypoglycemia on 3/20 with RRT x 2 and found with severe anemia to 3.4 with hemorrhagic shock second to left perirenal and subcapsular hemorrhage and ultimately PEA arrest. ROSC achieved and transferred to MICU. s/p IR emobilization and course complicated anoxic brain injury, myoclonic jerks, GIB, MATA, HAGMA, DVTs (R IJ and LUE brachial), dysphagia, cirrhosis, and prolonged vent time s/p trach on 4/4. Transferred to [facility] on 4/18.     NEUROLOGY  # Anoxic brain injury   - s/p cardiac arrest on 3/20 with ROSC in 4 minutes   - CT HEAD post arrest with diffuse sulcal effacement with increased intracranial pressure, and few patchy foci of cortical blurring concern for HIE  - MRI brain post arrest with with diffuse global abnormal supratentorial cortical restricted diffusion consistent with global hypoxic injury   - Monitor for now    # Seizures vs myoclonic jerks   - Witness myoclonic jerking concerning for seizures with anoxic brain injury  - vEEG with abundant myoclonic seizures in the setting of a severe diffuse/multifocal cerebral dysfunction which can be seen in the setting of sedative effect or due to anoxia  - s/p valium 10 Q4H   - Continue on keppra   - Continue on klonopin 2mg TID   - Continue on dilaudid PRN     # Central fevers   - Persistent fever spikes noted with concern for central fevers   - Bromocriptine started on 4/23, however noted with rise in LFTs and now stopped on 4/27   - Monitor fever curve.     HEENT  # Tongue biting   - Noted with tongue trauma  - s/p bite block replacement by OMFS on 4/23  - dental mouthguard mold creation on 5/2 -  Order scheduled to arrive Friday, May 16th. if discharged will need to follow up outpatient per dental.  - Continue with mouth care     # Thrush   - Continue on nystatin (4/28 - 5/4)   - Continue with mouth care     CARDIOLOGY  # Shock state likely hemorrhagic vs vasoplegia  - Acute renal bleed noted requiring multiple transfusions and pressors in ICU.   - TTE 3/22 with EF 63 with normal LVRVSF with PASP 49  - Weaned off pressors in ICU and complicated by hypertension.   - Continue on norvasc 5 QD   - Monitor BP     # Autonomic vs pain  - Mild HTN and tachypnea noted likely pain induced vs autonomia?   - Continue on dilaudid pushes PRN    PULMONARY  # Respiratory failure   - Presented with SCC and hypoxia likely from anemia with no signs of acute chest, however noted with PEA with hemorrhagic shock post renal bx requiring intubation.   - Prolonged intubation and s/p 7 PORTEX trach on 4/4   - Continue on vent 20/460/6/30 with nebs and chest PT  - Continue on dilaudid PRN for dyssynchrony     GI  # Shock liver vs cirrhosis with sickle cell   - Transaminitis noted in ICU and thought to be second to shock liver likely second to hemorrhagic shock vs cardiac arrest, however LFTs remains elevated with acute on chronic hyperbilirubinemia.   - US ABD suggestive of early cirrhotic changes, cholecystectomy, and CBD 9mm   - CT AP with cirrhosis   - LFTs rising with bromocriptine   - Holding further bromocriptine   - Trend LFTs and bili     # GIB  - Anemia with GIB noted in ICU   - s/p EGD 4/11 with cauterization of gastric ulcer & hemostatic spraying of three duodenal ulcers. Gastric ulcer likely due to NGT trauma.   - Continue on PPI BID   - Monitor stools and HH     # Oropharyngeal dysphagia   - GI unable to place PEG due to hepatomegaly.   - IR unable to place PEG second to no safe window   - s/p open G TUBE placement on 4/18   - Continue on TF    RENAL  # Acute renal failure and HAGMA second to shock state   - Scr on admission was 1.25 (3/15/25) and increased to 6s while in ICU and s/p HD 3/24-3/29 and then again on 4/4.   - L SHILEY removed on 4/15   - Continue on HCO3 1300 TID tabs with improved acidosis, however hypernatremic and stopped.   - Monitor renal function, acidosis, and UOP     # L renal mass bx c/b hematoma   - Renal bx as below on 3/19  - Hemorrhagic shock and PEA arrest on 3/20   - Renal US with large L renal subcapsular hematoma.    - IR left renal angiogram and embolization on 3/20   - CT 4/6 with unchanged large left subcapsular and perinephric hematoma.  - CT A/P (4/20) showing stable subcapsular renal hematoma and NO new active bleeds.  - Monitor for now    # Urinary retention   - Passed TOV on 4/23   - Urinary retention again and likely neurogenic bladder   - Will place daniel today and hold further TOVs    # Hypernatremia  - s/p D5W   - Continue on  Q6H however sodium worsening   - Continue on FWF with no improvement.   - Continue on D5 yesterday, however IV trouble noted and sodium rising today.   - Hold further HCO3 tabs and rehydrate again with D5 and  Q6H   - sodium improving with D5 fluids and FWF  - continue to monitor Na+    # Hypercalcemia likely from dehydration   - Rehydrate as above and monitor Ca     # Hyperphosphatemia   - Phos elevation likely second to renal failure   - PTH elevated, however Phos slowly down trending   - Monitor phos    INFECTIOUS DISEASE   # Central fevers  - Recurrent fevers and complete LVQ empirically as below   - Started on bromocriptine with improvement in fever curve, however noted with transaminitis and bromocriptine stopped with return of elevated temperature intermittently.   - Patient with known R IJ DVT and L kidney hematoma and now new LLE DVT that can cause intermittent fevers.   - Hold work up for now   - Hold further ABX for now   - Monitor fever curve.     # Questionable UTI  - Recurrent fevers with POSITIVE UA and s/p LVQ (4/23 - 4/28)   - Monitor off ABX     # VAP   - Post arrest noted with concern for aspiration and VAP in ICU   - Flu, SCx, BAL, BCx and UCx negative   - MRSA PCR with MSSA and completed bactroban in ICU   - Completed aztreonam (3/22-3/27) then casey (3/27-3/31) and vanco by dose in ICU   - CXR in ICU with RUL consolidation and completed recurrent casey course in ICU (4/2-4/7).     HEMATOLOGIC  # Anemia with SCC vs L kidney bleed vs GIB   - Baseline hemoglobin outpatient ~6.0-7.0 and sent in for anemia down to 5.3 without signs of bleeding and more likely SCC.    - Transfusion given on admission and improved back to baseline.   - Course complicated by hemorrhagic shock from left renal hematoma and HH down to 3.4.  - s/p 10U PRBC total while in ICU   - s/p PRBC pre-GTUBE with HH increased to 8.0  - c/b rapid decline in HH post GTUBE likely normalizing to baseline 6-7 as RPT CT AP post G TUBE with new bleeds and no acute signs of sickling.   - s/p PRBC 4/22   - Monitor HH     # SCD   - Discontinued deferasirox due to current renal failure   - Monitor Sickle labs QD (CBC with Diff, Retic, BMP, Hepatic Function, LDH and Hapto, and VBG).     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   - Eliquis held outpatient in anticipation for bx   - Eliquis switched to LVX and then held for bx in house   - Course complicated by multiple bleeds in ICU and challenged on heparin GTT  - VA DOPPLER 5/1 with acute LLE Gastrocnemius DVT   - Overall transfusion dependent and no further FULL AC    - Discussed with IR and recommend holding IVC filter for now  - Continue on DVT PPX HSQ and RPT DOPPLER on 5/5  - LUE and LLE precautions     # LUE arm infiltration   - HCO3 GTT infiltration in ICU   - Seen by hand sx and no compartment syndrome concern   - Monitor for now     # RUE blood infiltration   - RUE blood transfusion infiltration noted on 4/17 with area of ecchymosis   - RPT DOPPLER with known R IJ DVT, however no upper arm thrombus or issues in area of infiltration  - Monitor site for now    ENDOCRINE   # Glycemic control   - Monitor BG   - No hx of DM2     SKIN   - Wound care reccs appreciated    ETHICS   - FULL CODE     DISPO - vent facility when able 43 YO M with PMHx of sickle cell disease with prior acute chest syndrome requiring transfusion and exchange in the past (last 12/2024), iron overload, gout, HTN, and RUE DVT in 12/2024 on eliquis who presented on 3/15 by his hematologist second to anemia and hypoxia, down to 5.3 from baseline 7-8, with concern for vaso-occlusive crisis. While on medicine, anemia improved post transfusion and continued on SCC pain control PCA. Patient noted with left renal mass during prior admission and s/p L ureteroscopy with biopsy and stent placement on 3/19. Course complicated with hypoglycemia on 3/20 with RRT x 2 and found with severe anemia to 3.4 with hemorrhagic shock second to left perirenal and subcapsular hemorrhage and ultimately PEA arrest. ROSC achieved and transferred to MICU. s/p IR emobilization and course complicated anoxic brain injury, myoclonic jerks, GIB, MATA, HAGMA, DVTs (R IJ and LUE brachial), dysphagia, cirrhosis, and prolonged vent time s/p trach on 4/4. Transferred to [facility] on 4/18.     NEUROLOGY  # Anoxic brain injury   - s/p cardiac arrest on 3/20 with ROSC in 4 minutes   - CT HEAD post arrest with diffuse sulcal effacement with increased intracranial pressure, and few patchy foci of cortical blurring concern for HIE  - MRI brain post arrest with with diffuse global abnormal supratentorial cortical restricted diffusion consistent with global hypoxic injury   - Monitor for now    # Seizures vs myoclonic jerks   - Witness myoclonic jerking concerning for seizures with anoxic brain injury  - vEEG with abundant myoclonic seizures in the setting of a severe diffuse/multifocal cerebral dysfunction which can be seen in the setting of sedative effect or due to anoxia  - s/p valium 10 Q4H   - Continue on keppra   - Continue on klonopin 2mg TID   - Continue on dilaudid PRN     # Central fevers   - Persistent fever spikes noted with concern for central fevers   - Bromocriptine started on 4/23, however noted with rise in LFTs and now stopped on 4/27   - Monitor fever curve.     HEENT  # Tongue biting   - Noted with tongue trauma  - s/p bite block replacement by OMFS on 4/23  - dental mouthguard mold creation on 5/2 -  Order scheduled to arrive Friday, May 16th. if discharged will need to follow up outpatient per dental.  - Continue with mouth care     # Thrush   - Continue on nystatin (4/28 - 5/4)   - Continue with mouth care     CARDIOLOGY  # Shock state likely hemorrhagic vs vasoplegia  - Acute renal bleed noted requiring multiple transfusions and pressors in ICU.   - TTE 3/22 with EF 63 with normal LVRVSF with PASP 49  - Weaned off pressors in ICU and complicated by hypertension.   - Continue on norvasc 5 QD   - Monitor BP     # Autonomic vs pain  - Mild HTN and tachypnea noted likely pain induced vs autonomia?   - Continue on dilaudid pushes PRN    PULMONARY  # Respiratory failure   - Presented with SCC and hypoxia likely from anemia with no signs of acute chest, however noted with PEA with hemorrhagic shock post renal bx requiring intubation.   - Prolonged intubation and s/p 7 PORTEX trach on 4/4   - Continue on vent 20/460/6/30 with nebs and chest PT  - Continue on dilaudid PRN for dyssynchrony     GI  # Shock liver vs cirrhosis with sickle cell   - Transaminitis noted in ICU and thought to be second to shock liver likely second to hemorrhagic shock vs cardiac arrest, however LFTs remains elevated with acute on chronic hyperbilirubinemia.   - US ABD suggestive of early cirrhotic changes, cholecystectomy, and CBD 9mm   - CT AP with cirrhosis   - LFTs rising with bromocriptine   - Holding further bromocriptine   - Trend LFTs and bili     # GIB  - Anemia with GIB noted in ICU   - s/p EGD 4/11 with cauterization of gastric ulcer & hemostatic spraying of three duodenal ulcers. Gastric ulcer likely due to NGT trauma.   - Continue on PPI BID   - Monitor stools and HH     # Oropharyngeal dysphagia   - GI unable to place PEG due to hepatomegaly.   - IR unable to place PEG second to no safe window   - s/p open G TUBE placement on 4/18   - Continue on TF    RENAL  # Acute renal failure and HAGMA second to shock state   - Scr on admission was 1.25 (3/15/25) and increased to 6s while in ICU and s/p HD 3/24-3/29 and then again on 4/4.   - L SHILEY removed on 4/15   - Continue on HCO3 1300 TID tabs with improved acidosis, however hypernatremic and stopped.   - Monitor renal function, acidosis, and UOP     # L renal mass bx c/b hematoma   - Renal bx as below on 3/19  - Hemorrhagic shock and PEA arrest on 3/20   - Renal US with large L renal subcapsular hematoma.    - IR left renal angiogram and embolization on 3/20   - CT 4/6 with unchanged large left subcapsular and perinephric hematoma.  - CT A/P (4/20) showing stable subcapsular renal hematoma and NO new active bleeds.  - Monitor for now    # Urinary retention   - Passed TOV on 4/23   - Urinary retention again and likely neurogenic bladder   - Will place daniel today and hold further TOVs    # Hypernatremia  - s/p D5W   - Continue on  Q6H however sodium worsening   - Continue on FWF with no improvement.   - Continue on D5 yesterday, however IV trouble noted and sodium rising today.   - Hold further HCO3 tabs and rehydrate again with D5 and  Q6H   - sodium improving with D5 fluids and FWF  - continue to monitor     # Hypercalcemia likely from dehydration   - Rehydrate as above and monitor Ca     # Hyperphosphatemia   - Phos elevation likely second to renal failure   - PTH elevated, however Phos slowly down trending   - Monitor phos    INFECTIOUS DISEASE   # Central fevers  - Recurrent fevers and complete LVQ empirically as below   - Started on bromocriptine with improvement in fever curve, however noted with transaminitis and bromocriptine stopped with return of elevated temperature intermittently.   - Patient with known R IJ DVT and L kidney hematoma and now new LLE DVT that can cause intermittent fevers.   - Hold work up for now   - Hold further ABX for now   - Monitor fever curve.     # Questionable UTI  - Recurrent fevers with POSITIVE UA and s/p LVQ (4/23 - 4/28)   - Monitor off ABX     # VAP   - Post arrest noted with concern for aspiration and VAP in ICU   - Flu, SCx, BAL, BCx and UCx negative   - MRSA PCR with MSSA and completed bactroban in ICU   - Completed aztreonam (3/22-3/27) then casey (3/27-3/31) and vanco by dose in ICU   - CXR in ICU with RUL consolidation and completed recurrent casey course in ICU (4/2-4/7).     HEMATOLOGIC  # Anemia with SCC vs L kidney bleed vs GIB   - Baseline hemoglobin outpatient ~6.0-7.0 and sent in for anemia down to 5.3 without signs of bleeding and more likely SCC.    - Transfusion given on admission and improved back to baseline.   - Course complicated by hemorrhagic shock from left renal hematoma and HH down to 3.4.  - s/p 10U PRBC total while in ICU   - s/p PRBC pre-GTUBE with HH increased to 8.0  - c/b rapid decline in HH post GTUBE likely normalizing to baseline 6-7 as RPT CT AP post G TUBE with new bleeds and no acute signs of sickling.   - s/p PRBC 4/22   - Monitor HH     # SCD   - Discontinued deferasirox due to current renal failure   - Monitor Sickle labs QD (CBC with Diff, Retic, BMP, Hepatic Function, LDH and Hapto, and VBG).     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   - Eliquis held outpatient in anticipation for bx   - Eliquis switched to LVX and then held for bx in house   - Course complicated by multiple bleeds in ICU and challenged on heparin GTT  - VA DOPPLER 5/1 with acute LLE Gastrocnemius DVT   - Overall transfusion dependent and no further FULL AC    - Discussed with IR and recommend holding IVC filter for now  - Continue on DVT PPX HSQ and RPT DOPPLER on 5/5  - LUE and LLE precautions     # LUE arm infiltration   - HCO3 GTT infiltration in ICU   - Seen by hand sx and no compartment syndrome concern   - Monitor for now     # RUE blood infiltration   - RUE blood transfusion infiltration noted on 4/17 with area of ecchymosis   - RPT DOPPLER with known R IJ DVT, however no upper arm thrombus or issues in area of infiltration  - Monitor site for now    ENDOCRINE   # Glycemic control   - Monitor BG   - No hx of DM2     SKIN   - Wound care reccs appreciated    ETHICS   - FULL CODE     DISPO - vent facility when able

## 2025-05-03 NOTE — PROGRESS NOTE ADULT - SUBJECTIVE AND OBJECTIVE BOX
CHIEF COMPLAINT: Patient is a 44y old  Male who presents with a chief complaint of Referred by Hematologist for low Hg (02 May 2025 12:11)      INTERVAL EVENTS:   - no acute interval events  - Na+ improving with D5 fluids   - morning Hgb 6.7 -> 6.7, vital signs HDS, no active signs of bleeding     ROS: Seen by bedside during AM rounds     OBJECTIVE:  ICU Vital Signs Last 24 Hrs  T(C): 37 (03 May 2025 05:00), Max: 37.2 (02 May 2025 16:00)  T(F): 98.6 (03 May 2025 05:00), Max: 98.9 (02 May 2025 16:00)  HR: 118 (03 May 2025 05:00) (103 - 120)  BP: 135/87 (03 May 2025 05:00) (126/82 - 155/94)  BP(mean): 4 (02 May 2025 12:00) (4 - 4)  ABP: --  ABP(mean): --  RR: 20 (03 May 2025 05:00) (20 - 20)  SpO2: 100% (03 May 2025 05:00) (100% - 100%)    O2 Parameters below as of 03 May 2025 05:00  Patient On (Oxygen Delivery Method): ventilator    O2 Concentration (%): 30      Mode: AC/ CMV (Assist Control/ Continuous Mandatory Ventilation), RR (machine): 20, TV (machine): 460, FiO2: 30, PEEP: 6, ITime: 0.86, MAP: 12, PIP: 25    05-01 @ 07:01 - 05-02 @ 07:00  --------------------------------------------------------  IN: 2670 mL / OUT: 1000 mL / NET: 1670 mL    05-02 @ 07:01 - 05-03 @ 06:47  --------------------------------------------------------  IN: 520 mL / OUT: 900 mL / NET: -380 mL      CAPILLARY BLOOD GLUCOSE      POCT Blood Glucose.: 147 mg/dL (02 May 2025 22:14)      PHYSICAL EXAM:  General:   HEENT:   Lymph Nodes:  Neck:   Respiratory:   Cardiovascular:   Abdomen:   Extremities:   Skin:   Neurological:  Psychiatry:    Mode: AC/ CMV (Assist Control/ Continuous Mandatory Ventilation)  RR (machine): 20  TV (machine): 460  FiO2: 30  PEEP: 6  ITime: 0.86  MAP: 12  PIP: 25      HOSPITAL MEDICATIONS:  MEDICATIONS  (STANDING):  albuterol/ipratropium for Nebulization 3 milliLiter(s) Nebulizer every 6 hours  amLODIPine   Tablet 5 milliGRAM(s) Oral daily  chlorhexidine 0.12% Liquid 15 milliLiter(s) Oral Mucosa two times a day  chlorhexidine 2% Cloths 1 Application(s) Topical daily  clonazePAM  Tablet 2 milliGRAM(s) Oral every 8 hours  dextrose 5%. 1000 milliLiter(s) (100 mL/Hr) IV Continuous <Continuous>  heparin   Injectable 5000 Unit(s) SubCutaneous every 12 hours  influenza   Vaccine 0.5 milliLiter(s) IntraMuscular once  levETIRAcetam  Solution 500 milliGRAM(s) Oral two times a day  nystatin    Suspension 200367 Unit(s) Oral four times a day  pantoprazole   Suspension 40 milliGRAM(s) Oral every 12 hours    MEDICATIONS  (PRN):  acetaminophen   Oral Liquid .. 650 milliGRAM(s) Oral every 6 hours PRN Temp greater or equal to 38C (100.4F), Mild Pain (1 - 3)  HYDROmorphone  Injectable 0.5 milliGRAM(s) IV Push every 4 hours PRN dysynchronny      LABS:                        6.7    18.73 )-----------( 333      ( 03 May 2025 05:00 )             21.3     05-03    146[H]  |  113[H]  |  72[H]  ----------------------------<  163[H]  3.7   |  20[L]  |  1.55[H]    Ca    10.6[H]      03 May 2025 05:00  Phos  3.0     05-03  Mg     2.30     05-03    TPro  7.5  /  Alb  2.9[L]  /  TBili  2.4[H]  /  DBili  1.7[H]  /  AST  131[H]  /  ALT  124[H]  /  AlkPhos  697[H]  05-03      Urinalysis Basic - ( 03 May 2025 05:00 )    Color: x / Appearance: x / SG: x / pH: x  Gluc: 163 mg/dL / Ketone: x  / Bili: x / Urobili: x   Blood: x / Protein: x / Nitrite: x   Leuk Esterase: x / RBC: x / WBC x   Sq Epi: x / Non Sq Epi: x / Bacteria: x        Venous Blood Gas:  05-03 @ 05:00  7.36/42/64/24/93.3  VBG Lactate: 1.8   CHIEF COMPLAINT: Patient is a 44y old  Male who presents with a chief complaint of Referred by Hematologist for low Hg (02 May 2025 12:11)      INTERVAL EVENTS:   - no acute interval events  - Na+ improving with D5 fluids   - morning Hgb 6.7 -> 6.7, vital signs HDS, no active signs of bleeding     ROS: Seen by bedside during AM rounds, unable to obtain due to ventilator    OBJECTIVE:  ICU Vital Signs Last 24 Hrs  T(C): 37 (03 May 2025 05:00), Max: 37.2 (02 May 2025 16:00)  T(F): 98.6 (03 May 2025 05:00), Max: 98.9 (02 May 2025 16:00)  HR: 118 (03 May 2025 05:00) (103 - 120)  BP: 135/87 (03 May 2025 05:00) (126/82 - 155/94)  BP(mean): 4 (02 May 2025 12:00) (4 - 4)  ABP: --  ABP(mean): --  RR: 20 (03 May 2025 05:00) (20 - 20)  SpO2: 100% (03 May 2025 05:00) (100% - 100%)    O2 Parameters below as of 03 May 2025 05:00  Patient On (Oxygen Delivery Method): ventilator    O2 Concentration (%): 30      Mode: AC/ CMV (Assist Control/ Continuous Mandatory Ventilation), RR (machine): 20, TV (machine): 460, FiO2: 30, PEEP: 6, ITime: 0.86, MAP: 12, PIP: 25    05-01 @ 07:01 - 05-02 @ 07:00  --------------------------------------------------------  IN: 2670 mL / OUT: 1000 mL / NET: 1670 mL    05-02 @ 07:01  -  05-03 @ 06:47  --------------------------------------------------------  IN: 520 mL / OUT: 900 mL / NET: -380 mL      CAPILLARY BLOOD GLUCOSE      POCT Blood Glucose.: 147 mg/dL (02 May 2025 22:14)      PHYSICAL EXAM:  General: NAD, on ventilator  Neck: neck supple, no JVD, tracheostomy midline   Respiratory: +coarse breath sounds b/l, no rales, or wheeze, normal respiratory effort  Cardiovascular: +sinus tachycardia  Abdomen: soft, non tender, +PEG  Extremities: no LE edema b/l  Skin: warm, dry   Neurological: AAO x 0, eyes are open but does not track or follow commands  Psychiatry: no agitation on exam    Mode: AC/ CMV (Assist Control/ Continuous Mandatory Ventilation)  RR (machine): 20  TV (machine): 460  FiO2: 30  PEEP: 6  ITime: 0.86  MAP: 12  PIP: 25      HOSPITAL MEDICATIONS:  MEDICATIONS  (STANDING):  albuterol/ipratropium for Nebulization 3 milliLiter(s) Nebulizer every 6 hours  amLODIPine   Tablet 5 milliGRAM(s) Oral daily  chlorhexidine 0.12% Liquid 15 milliLiter(s) Oral Mucosa two times a day  chlorhexidine 2% Cloths 1 Application(s) Topical daily  clonazePAM  Tablet 2 milliGRAM(s) Oral every 8 hours  dextrose 5%. 1000 milliLiter(s) (100 mL/Hr) IV Continuous <Continuous>  heparin   Injectable 5000 Unit(s) SubCutaneous every 12 hours  influenza   Vaccine 0.5 milliLiter(s) IntraMuscular once  levETIRAcetam  Solution 500 milliGRAM(s) Oral two times a day  nystatin    Suspension 033180 Unit(s) Oral four times a day  pantoprazole   Suspension 40 milliGRAM(s) Oral every 12 hours    MEDICATIONS  (PRN):  acetaminophen   Oral Liquid .. 650 milliGRAM(s) Oral every 6 hours PRN Temp greater or equal to 38C (100.4F), Mild Pain (1 - 3)  HYDROmorphone  Injectable 0.5 milliGRAM(s) IV Push every 4 hours PRN dysynchronny      LABS:                        6.7    18.73 )-----------( 333      ( 03 May 2025 05:00 )             21.3     05-03    146[H]  |  113[H]  |  72[H]  ----------------------------<  163[H]  3.7   |  20[L]  |  1.55[H]    Ca    10.6[H]      03 May 2025 05:00  Phos  3.0     05-03  Mg     2.30     05-03    TPro  7.5  /  Alb  2.9[L]  /  TBili  2.4[H]  /  DBili  1.7[H]  /  AST  131[H]  /  ALT  124[H]  /  AlkPhos  697[H]  05-03      Urinalysis Basic - ( 03 May 2025 05:00 )    Color: x / Appearance: x / SG: x / pH: x  Gluc: 163 mg/dL / Ketone: x  / Bili: x / Urobili: x   Blood: x / Protein: x / Nitrite: x   Leuk Esterase: x / RBC: x / WBC x   Sq Epi: x / Non Sq Epi: x / Bacteria: x        Venous Blood Gas:  05-03 @ 05:00  7.36/42/64/24/93.3  VBG Lactate: 1.8

## 2025-05-04 LAB
ALBUMIN SERPL ELPH-MCNC: 2.9 G/DL — LOW (ref 3.3–5)
ALP SERPL-CCNC: 655 U/L — HIGH (ref 40–120)
ALT FLD-CCNC: 107 U/L — HIGH (ref 4–41)
ANION GAP SERPL CALC-SCNC: 15 MMOL/L — HIGH (ref 7–14)
AST SERPL-CCNC: 104 U/L — HIGH (ref 4–40)
BASOPHILS # BLD AUTO: 0.13 K/UL — SIGNIFICANT CHANGE UP (ref 0–0.2)
BASOPHILS NFR BLD AUTO: 0.6 % — SIGNIFICANT CHANGE UP (ref 0–2)
BILIRUB DIRECT SERPL-MCNC: 1.5 MG/DL — HIGH (ref 0–0.3)
BILIRUB INDIRECT FLD-MCNC: 0.8 MG/DL — SIGNIFICANT CHANGE UP (ref 0–1)
BILIRUB SERPL-MCNC: 2.3 MG/DL — HIGH (ref 0.2–1.2)
BUN SERPL-MCNC: 70 MG/DL — HIGH (ref 7–23)
CALCIUM SERPL-MCNC: 10.5 MG/DL — SIGNIFICANT CHANGE UP (ref 8.4–10.5)
CHLORIDE SERPL-SCNC: 112 MMOL/L — HIGH (ref 98–107)
CO2 SERPL-SCNC: 18 MMOL/L — LOW (ref 22–31)
CREAT SERPL-MCNC: 1.46 MG/DL — HIGH (ref 0.5–1.3)
EGFR: 60 ML/MIN/1.73M2 — SIGNIFICANT CHANGE UP
EGFR: 60 ML/MIN/1.73M2 — SIGNIFICANT CHANGE UP
EOSINOPHIL # BLD AUTO: 0.72 K/UL — HIGH (ref 0–0.5)
EOSINOPHIL NFR BLD AUTO: 3.4 % — SIGNIFICANT CHANGE UP (ref 0–6)
GAS PNL BLDV: SIGNIFICANT CHANGE UP
GLUCOSE BLDC GLUCOMTR-MCNC: 127 MG/DL — HIGH (ref 70–99)
GLUCOSE BLDC GLUCOMTR-MCNC: 158 MG/DL — HIGH (ref 70–99)
GLUCOSE SERPL-MCNC: 147 MG/DL — HIGH (ref 70–99)
HAPTOGLOB SERPL-MCNC: 43 MG/DL — SIGNIFICANT CHANGE UP (ref 34–200)
HCT VFR BLD CALC: 20.6 % — CRITICAL LOW (ref 39–50)
HGB BLD-MCNC: 6.7 G/DL — CRITICAL LOW (ref 13–17)
IANC: 17.23 K/UL — HIGH (ref 1.8–7.4)
IMM GRANULOCYTES NFR BLD AUTO: 0.8 % — SIGNIFICANT CHANGE UP (ref 0–0.9)
LDH SERPL L TO P-CCNC: 275 U/L — HIGH (ref 135–225)
LYMPHOCYTES # BLD AUTO: 1.98 K/UL — SIGNIFICANT CHANGE UP (ref 1–3.3)
LYMPHOCYTES # BLD AUTO: 9.2 % — LOW (ref 13–44)
MAGNESIUM SERPL-MCNC: 2.5 MG/DL — SIGNIFICANT CHANGE UP (ref 1.6–2.6)
MCHC RBC-ENTMCNC: 30 PG — SIGNIFICANT CHANGE UP (ref 27–34)
MCHC RBC-ENTMCNC: 32.5 G/DL — SIGNIFICANT CHANGE UP (ref 32–36)
MCV RBC AUTO: 92.4 FL — SIGNIFICANT CHANGE UP (ref 80–100)
MONOCYTES # BLD AUTO: 1.25 K/UL — HIGH (ref 0–0.9)
MONOCYTES NFR BLD AUTO: 5.8 % — SIGNIFICANT CHANGE UP (ref 2–14)
NEUTROPHILS # BLD AUTO: 17.23 K/UL — HIGH (ref 1.8–7.4)
NEUTROPHILS NFR BLD AUTO: 80.2 % — HIGH (ref 43–77)
NRBC # BLD AUTO: 0.07 K/UL — HIGH (ref 0–0)
NRBC # FLD: 0.07 K/UL — HIGH (ref 0–0)
NRBC BLD AUTO-RTO: 0 /100 WBCS — SIGNIFICANT CHANGE UP (ref 0–0)
PHOSPHATE SERPL-MCNC: 3.4 MG/DL — SIGNIFICANT CHANGE UP (ref 2.5–4.5)
PLATELET # BLD AUTO: 321 K/UL — SIGNIFICANT CHANGE UP (ref 150–400)
POTASSIUM SERPL-MCNC: 4.1 MMOL/L — SIGNIFICANT CHANGE UP (ref 3.5–5.3)
POTASSIUM SERPL-SCNC: 4.1 MMOL/L — SIGNIFICANT CHANGE UP (ref 3.5–5.3)
PROT SERPL-MCNC: 7.9 G/DL — SIGNIFICANT CHANGE UP (ref 6–8.3)
RBC # BLD: 2.23 M/UL — LOW (ref 4.2–5.8)
RBC # BLD: 2.23 M/UL — LOW (ref 4.2–5.8)
RBC # FLD: 18.5 % — HIGH (ref 10.3–14.5)
RETICS #: 125.8 K/UL — HIGH (ref 25–125)
RETICS/RBC NFR: 5.6 % — HIGH (ref 0.5–2.5)
SODIUM SERPL-SCNC: 145 MMOL/L — SIGNIFICANT CHANGE UP (ref 135–145)
WBC # BLD: 21.49 K/UL — HIGH (ref 3.8–10.5)
WBC # FLD AUTO: 21.49 K/UL — HIGH (ref 3.8–10.5)

## 2025-05-04 PROCEDURE — 99233 SBSQ HOSP IP/OBS HIGH 50: CPT

## 2025-05-04 RX ADMIN — Medication 0.5 MILLIGRAM(S): at 17:15

## 2025-05-04 RX ADMIN — Medication 40 MILLIGRAM(S): at 18:16

## 2025-05-04 RX ADMIN — LEVETIRACETAM 500 MILLIGRAM(S): 10 INJECTION, SOLUTION INTRAVENOUS at 05:02

## 2025-05-04 RX ADMIN — Medication 500000 UNIT(S): at 11:04

## 2025-05-04 RX ADMIN — HEPARIN SODIUM 5000 UNIT(S): 1000 INJECTION INTRAVENOUS; SUBCUTANEOUS at 05:01

## 2025-05-04 RX ADMIN — CLONAZEPAM 2 MILLIGRAM(S): 0.5 TABLET ORAL at 21:18

## 2025-05-04 RX ADMIN — Medication 1 APPLICATION(S): at 11:03

## 2025-05-04 RX ADMIN — Medication 500000 UNIT(S): at 05:02

## 2025-05-04 RX ADMIN — Medication 15 MILLILITER(S): at 05:02

## 2025-05-04 RX ADMIN — Medication 15 MILLILITER(S): at 17:15

## 2025-05-04 RX ADMIN — IPRATROPIUM BROMIDE AND ALBUTEROL SULFATE 3 MILLILITER(S): .5; 2.5 SOLUTION RESPIRATORY (INHALATION) at 20:10

## 2025-05-04 RX ADMIN — HEPARIN SODIUM 5000 UNIT(S): 1000 INJECTION INTRAVENOUS; SUBCUTANEOUS at 17:16

## 2025-05-04 RX ADMIN — IPRATROPIUM BROMIDE AND ALBUTEROL SULFATE 3 MILLILITER(S): .5; 2.5 SOLUTION RESPIRATORY (INHALATION) at 03:25

## 2025-05-04 RX ADMIN — Medication 500000 UNIT(S): at 17:16

## 2025-05-04 RX ADMIN — IPRATROPIUM BROMIDE AND ALBUTEROL SULFATE 3 MILLILITER(S): .5; 2.5 SOLUTION RESPIRATORY (INHALATION) at 08:08

## 2025-05-04 RX ADMIN — Medication 650 MILLIGRAM(S): at 17:14

## 2025-05-04 RX ADMIN — AMLODIPINE BESYLATE 5 MILLIGRAM(S): 10 TABLET ORAL at 05:02

## 2025-05-04 RX ADMIN — LEVETIRACETAM 500 MILLIGRAM(S): 10 INJECTION, SOLUTION INTRAVENOUS at 17:16

## 2025-05-04 RX ADMIN — CLONAZEPAM 2 MILLIGRAM(S): 0.5 TABLET ORAL at 13:00

## 2025-05-04 RX ADMIN — IPRATROPIUM BROMIDE AND ALBUTEROL SULFATE 3 MILLILITER(S): .5; 2.5 SOLUTION RESPIRATORY (INHALATION) at 16:16

## 2025-05-04 RX ADMIN — Medication 650 MILLIGRAM(S): at 17:44

## 2025-05-04 RX ADMIN — Medication 40 MILLIGRAM(S): at 05:02

## 2025-05-04 RX ADMIN — CLONAZEPAM 2 MILLIGRAM(S): 0.5 TABLET ORAL at 05:02

## 2025-05-04 NOTE — PROGRESS NOTE ADULT - SUBJECTIVE AND OBJECTIVE BOX
CHIEF COMPLAINT:Patient is a 44y old  Male who presents with a chief complaint of Referred by Hematologist for low Hg (03 May 2025 06:47)      INTERVAL EVENTS:     ROS: Seen by bedside during AM rounds     OBJECTIVE:  ICU Vital Signs Last 24 Hrs  T(C): 37.1 (04 May 2025 05:00), Max: 37.2 (04 May 2025 04:00)  T(F): 98.8 (04 May 2025 05:00), Max: 99 (04 May 2025 04:00)  HR: 110 (04 May 2025 07:24) (108 - 122)  BP: 140/84 (04 May 2025 05:00) (132/83 - 147/90)  BP(mean): --  ABP: --  ABP(mean): --  RR: 20 (04 May 2025 05:00) (20 - 20)  SpO2: 100% (04 May 2025 07:24) (100% - 100%)    O2 Parameters below as of 04 May 2025 05:00  Patient On (Oxygen Delivery Method): ventilator    O2 Concentration (%): 30      Mode: AC/ CMV (Assist Control/ Continuous Mandatory Ventilation), RR (machine): 20, TV (machine): 460, FiO2: 30, PEEP: 6, ITime: 0.85, MAP: 11, PIP: 23    05-03 @ 07:01  -  05-04 @ 07:00  --------------------------------------------------------  IN: 2090 mL / OUT: 1550 mL / NET: 540 mL      CAPILLARY BLOOD GLUCOSE      POCT Blood Glucose.: 157 mg/dL (03 May 2025 21:41)      PHYSICAL EXAM:  General:   HEENT:   Lymph Nodes:  Neck:   Respiratory:   Cardiovascular:   Abdomen:   Extremities:   Skin:   Neurological:  Psychiatry:    Mode: AC/ CMV (Assist Control/ Continuous Mandatory Ventilation)  RR (machine): 20  TV (machine): 460  FiO2: 30  PEEP: 6  ITime: 0.85  MAP: 11  PIP: 23      HOSPITAL MEDICATIONS:  MEDICATIONS  (STANDING):  albuterol/ipratropium for Nebulization 3 milliLiter(s) Nebulizer every 6 hours  amLODIPine   Tablet 5 milliGRAM(s) Oral daily  chlorhexidine 0.12% Liquid 15 milliLiter(s) Oral Mucosa two times a day  chlorhexidine 2% Cloths 1 Application(s) Topical daily  clonazePAM  Tablet 2 milliGRAM(s) Oral every 8 hours  heparin   Injectable 5000 Unit(s) SubCutaneous every 12 hours  influenza   Vaccine 0.5 milliLiter(s) IntraMuscular once  levETIRAcetam  Solution 500 milliGRAM(s) Oral two times a day  nystatin    Suspension 053515 Unit(s) Oral four times a day  pantoprazole   Suspension 40 milliGRAM(s) Oral every 12 hours    MEDICATIONS  (PRN):  acetaminophen   Oral Liquid .. 650 milliGRAM(s) Oral every 6 hours PRN Temp greater or equal to 38C (100.4F), Mild Pain (1 - 3)  HYDROmorphone  Injectable 0.5 milliGRAM(s) IV Push every 4 hours PRN dysynchronny      LABS:                        6.7    21.49 )-----------( 321      ( 04 May 2025 06:10 )             20.6     05-04    145  |  112[H]  |  70[H]  ----------------------------<  147[H]  4.1   |  18[L]  |  1.46[H]    Ca    10.5      04 May 2025 06:10  Phos  3.4     05-04  Mg     2.50     05-04    TPro  7.9  /  Alb  2.9[L]  /  TBili  2.3[H]  /  DBili  1.5[H]  /  AST  104[H]  /  ALT  107[H]  /  AlkPhos  655[H]  05-04      Urinalysis Basic - ( 04 May 2025 06:10 )    Color: x / Appearance: x / SG: x / pH: x  Gluc: 147 mg/dL / Ketone: x  / Bili: x / Urobili: x   Blood: x / Protein: x / Nitrite: x   Leuk Esterase: x / RBC: x / WBC x   Sq Epi: x / Non Sq Epi: x / Bacteria: x        Venous Blood Gas:  05-04 @ 06:10  7.39/35/145/21/99.2  VBG Lactate: 1.8  Venous Blood Gas:  05-03 @ 05:00  7.36/42/64/24/93.3  VBG Lactate: 1.8   CHIEF COMPLAINT:Patient is a 44y old  Male who presents with a chief complaint of Referred by Hematologist for low Hg (03 May 2025 06:47)      INTERVAL EVENTS:     ROS: Seen by bedside during AM rounds     OBJECTIVE:  ICU Vital Signs Last 24 Hrs  T(C): 37.1 (04 May 2025 05:00), Max: 37.2 (04 May 2025 04:00)  T(F): 98.8 (04 May 2025 05:00), Max: 99 (04 May 2025 04:00)  HR: 110 (04 May 2025 07:24) (108 - 122)  BP: 140/84 (04 May 2025 05:00) (132/83 - 147/90)  BP(mean): --  ABP: --  ABP(mean): --  RR: 20 (04 May 2025 05:00) (20 - 20)  SpO2: 100% (04 May 2025 07:24) (100% - 100%)    O2 Parameters below as of 04 May 2025 05:00  Patient On (Oxygen Delivery Method): ventilator    O2 Concentration (%): 30      Mode: AC/ CMV (Assist Control/ Continuous Mandatory Ventilation), RR (machine): 20, TV (machine): 460, FiO2: 30, PEEP: 6, ITime: 0.85, MAP: 11, PIP: 23    05-03 @ 07:01  -  05-04 @ 07:00  --------------------------------------------------------  IN: 2090 mL / OUT: 1550 mL / NET: 540 mL      CAPILLARY BLOOD GLUCOSE      POCT Blood Glucose.: 157 mg/dL (03 May 2025 21:41)      PHYSICAL EXAM:  General: NAD   Neck: (+) Trach tube noted, site c/d/i.  Cards: S1/S2, no murmurs   Pulm: CTA bilaterally. No wheezes.   Abdomen: Soft, NTND. Midline laparotomy incision with gauze dressing overlying. (+) G tube noted, site c/d/i.   Extremities: Anasarca with b/l UE and LE edema. Extremities warm to touch.  Neurology: Anoxic. Awake/eyes open. PERRL b/l. Nonverbal . Not following commands. Not withdrawing to pain.   Skin: warm to touch, color appropriate for ethnicity. Refer to RN assessment for further details.      Mode: AC/ CMV (Assist Control/ Continuous Mandatory Ventilation)  RR (machine): 20  TV (machine): 460  FiO2: 30  PEEP: 6  ITime: 0.85  MAP: 11  PIP: 23      HOSPITAL MEDICATIONS:  MEDICATIONS  (STANDING):  albuterol/ipratropium for Nebulization 3 milliLiter(s) Nebulizer every 6 hours  amLODIPine   Tablet 5 milliGRAM(s) Oral daily  chlorhexidine 0.12% Liquid 15 milliLiter(s) Oral Mucosa two times a day  chlorhexidine 2% Cloths 1 Application(s) Topical daily  clonazePAM  Tablet 2 milliGRAM(s) Oral every 8 hours  heparin   Injectable 5000 Unit(s) SubCutaneous every 12 hours  influenza   Vaccine 0.5 milliLiter(s) IntraMuscular once  levETIRAcetam  Solution 500 milliGRAM(s) Oral two times a day  nystatin    Suspension 883982 Unit(s) Oral four times a day  pantoprazole   Suspension 40 milliGRAM(s) Oral every 12 hours    MEDICATIONS  (PRN):  acetaminophen   Oral Liquid .. 650 milliGRAM(s) Oral every 6 hours PRN Temp greater or equal to 38C (100.4F), Mild Pain (1 - 3)  HYDROmorphone  Injectable 0.5 milliGRAM(s) IV Push every 4 hours PRN dysynchronny      LABS:                        6.7    21.49 )-----------( 321      ( 04 May 2025 06:10 )             20.6     05-04    145  |  112[H]  |  70[H]  ----------------------------<  147[H]  4.1   |  18[L]  |  1.46[H]    Ca    10.5      04 May 2025 06:10  Phos  3.4     05-04  Mg     2.50     05-04    TPro  7.9  /  Alb  2.9[L]  /  TBili  2.3[H]  /  DBili  1.5[H]  /  AST  104[H]  /  ALT  107[H]  /  AlkPhos  655[H]  05-04      Urinalysis Basic - ( 04 May 2025 06:10 )    Color: x / Appearance: x / SG: x / pH: x  Gluc: 147 mg/dL / Ketone: x  / Bili: x / Urobili: x   Blood: x / Protein: x / Nitrite: x   Leuk Esterase: x / RBC: x / WBC x   Sq Epi: x / Non Sq Epi: x / Bacteria: x        Venous Blood Gas:  05-04 @ 06:10  7.39/35/145/21/99.2  VBG Lactate: 1.8  Venous Blood Gas:  05-03 @ 05:00  7.36/42/64/24/93.3  VBG Lactate: 1.8   CHIEF COMPLAINT:Patient is a 44y old  Male who presents with a chief complaint of Referred by Hematologist for low Hg (03 May 2025 06:47)      INTERVAL EVENTS: no overnight events noted.     ROS: Unable to obtain ROS given patient is anoxic.     OBJECTIVE:  ICU Vital Signs Last 24 Hrs  T(C): 37.1 (04 May 2025 05:00), Max: 37.2 (04 May 2025 04:00)  T(F): 98.8 (04 May 2025 05:00), Max: 99 (04 May 2025 04:00)  HR: 110 (04 May 2025 07:24) (108 - 122)  BP: 140/84 (04 May 2025 05:00) (132/83 - 147/90)  BP(mean): --  ABP: --  ABP(mean): --  RR: 20 (04 May 2025 05:00) (20 - 20)  SpO2: 100% (04 May 2025 07:24) (100% - 100%)    O2 Parameters below as of 04 May 2025 05:00  Patient On (Oxygen Delivery Method): ventilator    O2 Concentration (%): 30      Mode: AC/ CMV (Assist Control/ Continuous Mandatory Ventilation), RR (machine): 20, TV (machine): 460, FiO2: 30, PEEP: 6, ITime: 0.85, MAP: 11, PIP: 23    05-03 @ 07:01  -  05-04 @ 07:00  --------------------------------------------------------  IN: 2090 mL / OUT: 1550 mL / NET: 540 mL      CAPILLARY BLOOD GLUCOSE      POCT Blood Glucose.: 157 mg/dL (03 May 2025 21:41)      PHYSICAL EXAM:  General: NAD   Neck: (+) Trach tube noted, site c/d/i.  Cards: S1/S2, no murmurs   Pulm: CTA bilaterally. No wheezes.   Abdomen: Soft, NTND. Midline laparotomy incision with gauze dressing overlying. (+) G tube noted, site c/d/i.   Extremities: Anasarca with b/l UE and LE edema. Extremities warm to touch.  Neurology: Anoxic. Awake/eyes open. PERRL b/l. Nonverbal . Not following commands. Not withdrawing to pain.   Skin: warm to touch, color appropriate for ethnicity. Refer to RN assessment for further details.      Mode: AC/ CMV (Assist Control/ Continuous Mandatory Ventilation)  RR (machine): 20  TV (machine): 460  FiO2: 30  PEEP: 6  ITime: 0.85  MAP: 11  PIP: 23      HOSPITAL MEDICATIONS:  MEDICATIONS  (STANDING):  albuterol/ipratropium for Nebulization 3 milliLiter(s) Nebulizer every 6 hours  amLODIPine   Tablet 5 milliGRAM(s) Oral daily  chlorhexidine 0.12% Liquid 15 milliLiter(s) Oral Mucosa two times a day  chlorhexidine 2% Cloths 1 Application(s) Topical daily  clonazePAM  Tablet 2 milliGRAM(s) Oral every 8 hours  heparin   Injectable 5000 Unit(s) SubCutaneous every 12 hours  influenza   Vaccine 0.5 milliLiter(s) IntraMuscular once  levETIRAcetam  Solution 500 milliGRAM(s) Oral two times a day  nystatin    Suspension 901598 Unit(s) Oral four times a day  pantoprazole   Suspension 40 milliGRAM(s) Oral every 12 hours    MEDICATIONS  (PRN):  acetaminophen   Oral Liquid .. 650 milliGRAM(s) Oral every 6 hours PRN Temp greater or equal to 38C (100.4F), Mild Pain (1 - 3)  HYDROmorphone  Injectable 0.5 milliGRAM(s) IV Push every 4 hours PRN dysynchronny      LABS:                        6.7    21.49 )-----------( 321      ( 04 May 2025 06:10 )             20.6     05-04    145  |  112[H]  |  70[H]  ----------------------------<  147[H]  4.1   |  18[L]  |  1.46[H]    Ca    10.5      04 May 2025 06:10  Phos  3.4     05-04  Mg     2.50     05-04    TPro  7.9  /  Alb  2.9[L]  /  TBili  2.3[H]  /  DBili  1.5[H]  /  AST  104[H]  /  ALT  107[H]  /  AlkPhos  655[H]  05-04      Urinalysis Basic - ( 04 May 2025 06:10 )    Color: x / Appearance: x / SG: x / pH: x  Gluc: 147 mg/dL / Ketone: x  / Bili: x / Urobili: x   Blood: x / Protein: x / Nitrite: x   Leuk Esterase: x / RBC: x / WBC x   Sq Epi: x / Non Sq Epi: x / Bacteria: x        Venous Blood Gas:  05-04 @ 06:10  7.39/35/145/21/99.2  VBG Lactate: 1.8  Venous Blood Gas:  05-03 @ 05:00  7.36/42/64/24/93.3  VBG Lactate: 1.8

## 2025-05-04 NOTE — PROGRESS NOTE ADULT - ASSESSMENT
43 YO M with PMHx of sickle cell disease with prior acute chest syndrome requiring transfusion and exchange in the past (last 12/2024), iron overload, gout, HTN, and RUE DVT in 12/2024 on eliquis who presented on 3/15 by his hematologist second to anemia and hypoxia, down to 5.3 from baseline 7-8, with concern for vaso-occlusive crisis. While on medicine, anemia improved post transfusion and continued on SCC pain control PCA. Patient noted with left renal mass during prior admission and s/p L ureteroscopy with biopsy and stent placement on 3/19. Course complicated with hypoglycemia on 3/20 with RRT x 2 and found with severe anemia to 3.4 with hemorrhagic shock second to left perirenal and subcapsular hemorrhage and ultimately PEA arrest. ROSC achieved and transferred to MICU. s/p IR emobilization and course complicated anoxic brain injury, myoclonic jerks, GIB, MATA, HAGMA, DVTs (R IJ and LUE brachial), dysphagia, cirrhosis, and prolonged vent time s/p trach on 4/4. Transferred to [facility] on 4/18.     NEUROLOGY  # Anoxic brain injury   - s/p cardiac arrest on 3/20 with ROSC in 4 minutes   - CT HEAD post arrest with diffuse sulcal effacement with increased intracranial pressure, and few patchy foci of cortical blurring concern for HIE  - MRI brain post arrest with with diffuse global abnormal supratentorial cortical restricted diffusion consistent with global hypoxic injury   - Monitor for now    # Seizures vs myoclonic jerks   - Witness myoclonic jerking concerning for seizures with anoxic brain injury  - vEEG with abundant myoclonic seizures in the setting of a severe diffuse/multifocal cerebral dysfunction which can be seen in the setting of sedative effect or due to anoxia  - s/p valium 10 Q4H   - Continue on keppra   - Continue on klonopin 2mg TID   - Continue on dilaudid PRN     # Central fevers   - Persistent fever spikes noted with concern for central fevers   - Bromocriptine started on 4/23, however noted with rise in LFTs and now stopped on 4/27   - Monitor fever curve.     HEENT  # Tongue biting   - Noted with tongue trauma  - s/p bite block replacement by OMFS on 4/23  - dental mouthguard mold creation on 5/2 -  Order scheduled to arrive Friday, May 16th. if discharged will need to follow up outpatient per dental.  - Continue with mouth care     # Thrush   - Continue on nystatin (4/28 - 5/4)   - Continue with mouth care     CARDIOLOGY  # Shock state likely hemorrhagic vs vasoplegia  - Acute renal bleed noted requiring multiple transfusions and pressors in ICU.   - TTE 3/22 with EF 63 with normal LVRVSF with PASP 49  - Weaned off pressors in ICU and complicated by hypertension.   - Continue on norvasc 5 QD   - Monitor BP     # Autonomic vs pain  - Mild HTN and tachypnea noted likely pain induced vs autonomia?   - Continue on dilaudid pushes PRN    PULMONARY  # Respiratory failure   - Presented with SCC and hypoxia likely from anemia with no signs of acute chest, however noted with PEA with hemorrhagic shock post renal bx requiring intubation.   - Prolonged intubation and s/p 7 PORTEX trach on 4/4   - Continue on vent 20/460/6/30 with nebs and chest PT  - Continue on dilaudid PRN for dyssynchrony     GI  # Shock liver vs cirrhosis with sickle cell   - Transaminitis noted in ICU and thought to be second to shock liver likely second to hemorrhagic shock vs cardiac arrest, however LFTs remains elevated with acute on chronic hyperbilirubinemia.   - US ABD suggestive of early cirrhotic changes, cholecystectomy, and CBD 9mm   - CT AP with cirrhosis   - LFTs rising with bromocriptine   - Holding further bromocriptine   - Trend LFTs and bili     # GIB  - Anemia with GIB noted in ICU   - s/p EGD 4/11 with cauterization of gastric ulcer & hemostatic spraying of three duodenal ulcers. Gastric ulcer likely due to NGT trauma.   - Continue on PPI BID   - Monitor stools and HH     # Oropharyngeal dysphagia   - GI unable to place PEG due to hepatomegaly.   - IR unable to place PEG second to no safe window   - s/p open G TUBE placement on 4/18   - Continue on TF    RENAL  # Acute renal failure and HAGMA second to shock state   - Scr on admission was 1.25 (3/15/25) and increased to 6s while in ICU and s/p HD 3/24-3/29 and then again on 4/4.   - L SHILEY removed on 4/15   - Continue on HCO3 1300 TID tabs with improved acidosis, however hypernatremic and stopped.   - Monitor renal function, acidosis, and UOP     # L renal mass bx c/b hematoma   - Renal bx as below on 3/19  - Hemorrhagic shock and PEA arrest on 3/20   - Renal US with large L renal subcapsular hematoma.    - IR left renal angiogram and embolization on 3/20   - CT 4/6 with unchanged large left subcapsular and perinephric hematoma.  - CT A/P (4/20) showing stable subcapsular renal hematoma and NO new active bleeds.  - Monitor for now    # Urinary retention   - Passed TOV on 4/23   - Urinary retention again and likely neurogenic bladder   - Will place daniel today and hold further TOVs    # Hypernatremia  - s/p D5W   - Continue on  Q6H however sodium worsening   - Continue on FWF with no improvement.   - Continue on D5 yesterday, however IV trouble noted and sodium rising today.   - Hold further HCO3 tabs and rehydrate again with D5 and  Q6H   - sodium improving with D5 fluids and FWF  - continue to monitor     # Hypercalcemia likely from dehydration   - Rehydrate as above and monitor Ca     # Hyperphosphatemia   - Phos elevation likely second to renal failure   - PTH elevated, however Phos slowly down trending   - Monitor phos    INFECTIOUS DISEASE   # Central fevers  - Recurrent fevers and complete LVQ empirically as below   - Started on bromocriptine with improvement in fever curve, however noted with transaminitis and bromocriptine stopped with return of elevated temperature intermittently.   - Patient with known R IJ DVT and L kidney hematoma and now new LLE DVT that can cause intermittent fevers.   - Hold work up for now   - Hold further ABX for now   - Monitor fever curve.     # Questionable UTI  - Recurrent fevers with POSITIVE UA and s/p LVQ (4/23 - 4/28)   - Monitor off ABX     # VAP   - Post arrest noted with concern for aspiration and VAP in ICU   - Flu, SCx, BAL, BCx and UCx negative   - MRSA PCR with MSSA and completed bactroban in ICU   - Completed aztreonam (3/22-3/27) then casey (3/27-3/31) and vanco by dose in ICU   - CXR in ICU with RUL consolidation and completed recurrent casey course in ICU (4/2-4/7).     HEMATOLOGIC  # Anemia with SCC vs L kidney bleed vs GIB   - Baseline hemoglobin outpatient ~6.0-7.0 and sent in for anemia down to 5.3 without signs of bleeding and more likely SCC.    - Transfusion given on admission and improved back to baseline.   - Course complicated by hemorrhagic shock from left renal hematoma and HH down to 3.4.  - s/p 10U PRBC total while in ICU   - s/p PRBC pre-GTUBE with HH increased to 8.0  - c/b rapid decline in HH post GTUBE likely normalizing to baseline 6-7 as RPT CT AP post G TUBE with new bleeds and no acute signs of sickling.   - s/p PRBC 4/22   - Monitor HH     # SCD   - Discontinued deferasirox due to current renal failure   - Monitor Sickle labs QD (CBC with Diff, Retic, BMP, Hepatic Function, LDH and Hapto, and VBG).     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   - Eliquis held outpatient in anticipation for bx   - Eliquis switched to LVX and then held for bx in house   - Course complicated by multiple bleeds in ICU and challenged on heparin GTT  - VA DOPPLER 5/1 with acute LLE Gastrocnemius DVT   - Overall transfusion dependent and no further FULL AC    - Discussed with IR and recommend holding IVC filter for now  - Continue on DVT PPX HSQ and RPT DOPPLER on 5/5  - LUE and LLE precautions     # LUE arm infiltration   - HCO3 GTT infiltration in ICU   - Seen by hand sx and no compartment syndrome concern   - Monitor for now     # RUE blood infiltration   - RUE blood transfusion infiltration noted on 4/17 with area of ecchymosis   - RPT DOPPLER with known R IJ DVT, however no upper arm thrombus or issues in area of infiltration  - Monitor site for now    ENDOCRINE   # Glycemic control   - Monitor BG   - No hx of DM2     SKIN   - Wound care reccs appreciated    ETHICS   - FULL CODE     DISPO - vent facility when able

## 2025-05-04 NOTE — PROGRESS NOTE ADULT - SUBJECTIVE AND OBJECTIVE BOX
Dental consulted to reposition displaced bite block due to tongue biting.   Bite block repositioned on patient's right side and tongue is relieved from occlusion.   Patient being followed by dentistry for nightguard delivery.       Naomi Painter DDS #[id number]

## 2025-05-04 NOTE — PROGRESS NOTE ADULT - NS ATTEND AMEND GEN_ALL_CORE FT
agree with above  continues to remain afebrile, off abx  f/u LE dopplers today - if there has been propagation, will need IR filter  d/c plan to vent facility - tomorrow will be ready if stable (with/without filter if appropriate)  and facility/family to get oral appliance when it is ready

## 2025-05-05 LAB
ALBUMIN SERPL ELPH-MCNC: 3 G/DL — LOW (ref 3.3–5)
ALP SERPL-CCNC: 644 U/L — HIGH (ref 40–120)
ALT FLD-CCNC: 93 U/L — HIGH (ref 4–41)
ANION GAP SERPL CALC-SCNC: 16 MMOL/L — HIGH (ref 7–14)
AST SERPL-CCNC: 91 U/L — HIGH (ref 4–40)
BASOPHILS # BLD AUTO: 0.11 K/UL — SIGNIFICANT CHANGE UP (ref 0–0.2)
BASOPHILS NFR BLD AUTO: 0.5 % — SIGNIFICANT CHANGE UP (ref 0–2)
BILIRUB SERPL-MCNC: 2.4 MG/DL — HIGH (ref 0.2–1.2)
BLOOD GAS VENOUS COMPREHENSIVE RESULT: SIGNIFICANT CHANGE UP
BUN SERPL-MCNC: 74 MG/DL — HIGH (ref 7–23)
CALCIUM SERPL-MCNC: 10.6 MG/DL — HIGH (ref 8.4–10.5)
CHLORIDE SERPL-SCNC: 111 MMOL/L — HIGH (ref 98–107)
CO2 SERPL-SCNC: 17 MMOL/L — LOW (ref 22–31)
CREAT SERPL-MCNC: 1.51 MG/DL — HIGH (ref 0.5–1.3)
EGFR: 58 ML/MIN/1.73M2 — LOW
EGFR: 58 ML/MIN/1.73M2 — LOW
EOSINOPHIL # BLD AUTO: 0.69 K/UL — HIGH (ref 0–0.5)
EOSINOPHIL NFR BLD AUTO: 3.1 % — SIGNIFICANT CHANGE UP (ref 0–6)
GLUCOSE BLDC GLUCOMTR-MCNC: 123 MG/DL — HIGH (ref 70–99)
GLUCOSE BLDC GLUCOMTR-MCNC: 137 MG/DL — HIGH (ref 70–99)
GLUCOSE SERPL-MCNC: 127 MG/DL — HIGH (ref 70–99)
HAPTOGLOB SERPL-MCNC: 47 MG/DL — SIGNIFICANT CHANGE UP (ref 34–200)
HCT VFR BLD CALC: 21 % — CRITICAL LOW (ref 39–50)
HGB BLD-MCNC: 6.8 G/DL — CRITICAL LOW (ref 13–17)
IANC: 17.25 K/UL — HIGH (ref 1.8–7.4)
IMM GRANULOCYTES NFR BLD AUTO: 1 % — HIGH (ref 0–0.9)
LDH SERPL L TO P-CCNC: 280 U/L — HIGH (ref 135–225)
LYMPHOCYTES # BLD AUTO: 11.7 % — LOW (ref 13–44)
LYMPHOCYTES # BLD AUTO: 2.6 K/UL — SIGNIFICANT CHANGE UP (ref 1–3.3)
MAGNESIUM SERPL-MCNC: 2.7 MG/DL — HIGH (ref 1.6–2.6)
MCHC RBC-ENTMCNC: 30.1 PG — SIGNIFICANT CHANGE UP (ref 27–34)
MCHC RBC-ENTMCNC: 32.4 G/DL — SIGNIFICANT CHANGE UP (ref 32–36)
MCV RBC AUTO: 92.9 FL — SIGNIFICANT CHANGE UP (ref 80–100)
MONOCYTES # BLD AUTO: 1.43 K/UL — HIGH (ref 0–0.9)
MONOCYTES NFR BLD AUTO: 6.4 % — SIGNIFICANT CHANGE UP (ref 2–14)
NEUTROPHILS # BLD AUTO: 17.25 K/UL — HIGH (ref 1.8–7.4)
NEUTROPHILS NFR BLD AUTO: 77.3 % — HIGH (ref 43–77)
NRBC # BLD AUTO: 0.04 K/UL — HIGH (ref 0–0)
NRBC # FLD: 0.04 K/UL — HIGH (ref 0–0)
NRBC BLD AUTO-RTO: 0 /100 WBCS — SIGNIFICANT CHANGE UP (ref 0–0)
PHOSPHATE SERPL-MCNC: 3.9 MG/DL — SIGNIFICANT CHANGE UP (ref 2.5–4.5)
PLATELET # BLD AUTO: 301 K/UL — SIGNIFICANT CHANGE UP (ref 150–400)
POTASSIUM SERPL-MCNC: 4.5 MMOL/L — SIGNIFICANT CHANGE UP (ref 3.5–5.3)
POTASSIUM SERPL-SCNC: 4.5 MMOL/L — SIGNIFICANT CHANGE UP (ref 3.5–5.3)
PROT SERPL-MCNC: 8.1 G/DL — SIGNIFICANT CHANGE UP (ref 6–8.3)
RBC # BLD: 2.26 M/UL — LOW (ref 4.2–5.8)
RBC # BLD: 2.26 M/UL — LOW (ref 4.2–5.8)
RBC # FLD: 18.4 % — HIGH (ref 10.3–14.5)
RETICS #: 133.8 K/UL — HIGH (ref 25–125)
RETICS/RBC NFR: 5.9 % — HIGH (ref 0.5–2.5)
SODIUM SERPL-SCNC: 144 MMOL/L — SIGNIFICANT CHANGE UP (ref 135–145)
WBC # BLD: 22.3 K/UL — HIGH (ref 3.8–10.5)
WBC # FLD AUTO: 22.3 K/UL — HIGH (ref 3.8–10.5)

## 2025-05-05 PROCEDURE — 99233 SBSQ HOSP IP/OBS HIGH 50: CPT

## 2025-05-05 RX ADMIN — IPRATROPIUM BROMIDE AND ALBUTEROL SULFATE 3 MILLILITER(S): .5; 2.5 SOLUTION RESPIRATORY (INHALATION) at 10:20

## 2025-05-05 RX ADMIN — Medication 40 MILLIGRAM(S): at 05:20

## 2025-05-05 RX ADMIN — Medication 500000 UNIT(S): at 05:20

## 2025-05-05 RX ADMIN — HEPARIN SODIUM 5000 UNIT(S): 1000 INJECTION INTRAVENOUS; SUBCUTANEOUS at 05:20

## 2025-05-05 RX ADMIN — Medication 15 MILLILITER(S): at 05:20

## 2025-05-05 RX ADMIN — Medication 500000 UNIT(S): at 01:10

## 2025-05-05 RX ADMIN — IPRATROPIUM BROMIDE AND ALBUTEROL SULFATE 3 MILLILITER(S): .5; 2.5 SOLUTION RESPIRATORY (INHALATION) at 21:44

## 2025-05-05 RX ADMIN — Medication 40 MILLIGRAM(S): at 18:14

## 2025-05-05 RX ADMIN — Medication 1 APPLICATION(S): at 11:13

## 2025-05-05 RX ADMIN — CLONAZEPAM 2 MILLIGRAM(S): 0.5 TABLET ORAL at 05:19

## 2025-05-05 RX ADMIN — IPRATROPIUM BROMIDE AND ALBUTEROL SULFATE 3 MILLILITER(S): .5; 2.5 SOLUTION RESPIRATORY (INHALATION) at 16:14

## 2025-05-05 RX ADMIN — CLONAZEPAM 2 MILLIGRAM(S): 0.5 TABLET ORAL at 13:07

## 2025-05-05 RX ADMIN — CLONAZEPAM 2 MILLIGRAM(S): 0.5 TABLET ORAL at 21:03

## 2025-05-05 RX ADMIN — LEVETIRACETAM 500 MILLIGRAM(S): 10 INJECTION, SOLUTION INTRAVENOUS at 17:10

## 2025-05-05 RX ADMIN — HEPARIN SODIUM 5000 UNIT(S): 1000 INJECTION INTRAVENOUS; SUBCUTANEOUS at 17:10

## 2025-05-05 RX ADMIN — LEVETIRACETAM 500 MILLIGRAM(S): 10 INJECTION, SOLUTION INTRAVENOUS at 05:19

## 2025-05-05 RX ADMIN — IPRATROPIUM BROMIDE AND ALBUTEROL SULFATE 3 MILLILITER(S): .5; 2.5 SOLUTION RESPIRATORY (INHALATION) at 03:32

## 2025-05-05 RX ADMIN — Medication 15 MILLILITER(S): at 17:10

## 2025-05-05 RX ADMIN — AMLODIPINE BESYLATE 5 MILLIGRAM(S): 10 TABLET ORAL at 05:20

## 2025-05-05 RX ADMIN — Medication 500000 UNIT(S): at 11:13

## 2025-05-05 NOTE — PROGRESS NOTE ADULT - SUBJECTIVE AND OBJECTIVE BOX
CHIEF COMPLAINT: Patient is a 44y old  Male who presents with a chief complaint of Referred by Hematologist for low Hg (04 May 2025 19:05)    INTERVAL EVENTS:     ROS: Seen by bedside during AM rounds     OBJECTIVE:  ICU Vital Signs Last 24 Hrs  T(C): 37.4 (05 May 2025 05:05), Max: 37.8 (04 May 2025 17:29)  T(F): 99.4 (05 May 2025 05:05), Max: 100.1 (04 May 2025 17:29)  HR: 115 (05 May 2025 07:23) (110 - 130)  BP: 122/72 (05 May 2025 05:05) (115/66 - 138/79)  BP(mean): --  ABP: --  ABP(mean): --  RR: 20 (05 May 2025 05:05) (18 - 20)  SpO2: 100% (05 May 2025 07:23) (99% - 100%)    O2 Parameters below as of 05 May 2025 05:05  Patient On (Oxygen Delivery Method): ventilator    O2 Concentration (%): 30      Mode: AC/ CMV (Assist Control/ Continuous Mandatory Ventilation), RR (machine): 20, TV (machine): 460, FiO2: 30, PEEP: 6, ITime: 0.84, MAP: 11, PIP: 21    05-04 @ 07:01  -  05-05 @ 07:00  --------------------------------------------------------  IN: 2675 mL / OUT: 1700 mL / NET: 975 mL      CAPILLARY BLOOD GLUCOSE      POCT Blood Glucose.: 158 mg/dL (04 May 2025 21:57)      PHYSICAL EXAM:  General:   HEENT:   Lymph Nodes:  Neck:   Respiratory:   Cardiovascular:   Abdomen:   Extremities:   Skin:   Neurological:  Psychiatry:    Mode: AC/ CMV (Assist Control/ Continuous Mandatory Ventilation)  RR (machine): 20  TV (machine): 460  FiO2: 30  PEEP: 6  ITime: 0.84  MAP: 11  PIP: 21      HOSPITAL MEDICATIONS:  MEDICATIONS  (STANDING):  albuterol/ipratropium for Nebulization 3 milliLiter(s) Nebulizer every 6 hours  amLODIPine   Tablet 5 milliGRAM(s) Oral daily  chlorhexidine 0.12% Liquid 15 milliLiter(s) Oral Mucosa two times a day  chlorhexidine 2% Cloths 1 Application(s) Topical daily  clonazePAM  Tablet 2 milliGRAM(s) Oral every 8 hours  heparin   Injectable 5000 Unit(s) SubCutaneous every 12 hours  influenza   Vaccine 0.5 milliLiter(s) IntraMuscular once  levETIRAcetam  Solution 500 milliGRAM(s) Oral two times a day  nystatin    Suspension 218604 Unit(s) Oral four times a day  pantoprazole   Suspension 40 milliGRAM(s) Oral every 12 hours    MEDICATIONS  (PRN):  acetaminophen   Oral Liquid .. 650 milliGRAM(s) Oral every 6 hours PRN Temp greater or equal to 38C (100.4F), Mild Pain (1 - 3)  HYDROmorphone  Injectable 0.5 milliGRAM(s) IV Push every 4 hours PRN dysynchronny      LABS:                        6.7    21.49 )-----------( 321      ( 04 May 2025 06:10 )             20.6     05-04    145  |  112[H]  |  70[H]  ----------------------------<  147[H]  4.1   |  18[L]  |  1.46[H]    Ca    10.5      04 May 2025 06:10  Phos  3.4     05-04  Mg     2.50     05-04    TPro  7.9  /  Alb  2.9[L]  /  TBili  2.3[H]  /  DBili  1.5[H]  /  AST  104[H]  /  ALT  107[H]  /  AlkPhos  655[H]  05-04      Urinalysis Basic - ( 04 May 2025 06:10 )    Color: x / Appearance: x / SG: x / pH: x  Gluc: 147 mg/dL / Ketone: x  / Bili: x / Urobili: x   Blood: x / Protein: x / Nitrite: x   Leuk Esterase: x / RBC: x / WBC x   Sq Epi: x / Non Sq Epi: x / Bacteria: x        Venous Blood Gas:  05-05 @ 05:10  7.34/41/52/22/84.7  VBG Lactate: 1.5  Venous Blood Gas:  05-04 @ 06:10  7.39/35/145/21/99.2  VBG Lactate: 1.8   CHIEF COMPLAINT: Patient is a 44y old  Male who presents with a chief complaint of Referred by Hematologist for low Hg (04 May 2025 19:05)    INTERVAL EVENTS:     ROS: Seen by bedside during AM rounds     OBJECTIVE:  ICU Vital Signs Last 24 Hrs  T(C): 37.4 (05 May 2025 05:05), Max: 37.8 (04 May 2025 17:29)  T(F): 99.4 (05 May 2025 05:05), Max: 100.1 (04 May 2025 17:29)  HR: 115 (05 May 2025 07:23) (110 - 130)  BP: 122/72 (05 May 2025 05:05) (115/66 - 138/79)  BP(mean): --  ABP: --  ABP(mean): --  RR: 20 (05 May 2025 05:05) (18 - 20)  SpO2: 100% (05 May 2025 07:23) (99% - 100%)    O2 Parameters below as of 05 May 2025 05:05  Patient On (Oxygen Delivery Method): ventilator    O2 Concentration (%): 30      Mode: AC/ CMV (Assist Control/ Continuous Mandatory Ventilation), RR (machine): 20, TV (machine): 460, FiO2: 30, PEEP: 6, ITime: 0.84, MAP: 11, PIP: 21    05-04 @ 07:01  -  05-05 @ 07:00  --------------------------------------------------------  IN: 2675 mL / OUT: 1700 mL / NET: 975 mL      CAPILLARY BLOOD GLUCOSE      POCT Blood Glucose.: 158 mg/dL (04 May 2025 21:57)      PHYSICAL EXAM:  General: no acute distress  HEENT:   Neck:   Respiratory:   Cardiovascular:   Abdomen:   Extremities:   Skin:   Neurological:    Mode: AC/ CMV (Assist Control/ Continuous Mandatory Ventilation)  RR (machine): 20  TV (machine): 460  FiO2: 30  PEEP: 6  ITime: 0.84  MAP: 11  PIP: 21      HOSPITAL MEDICATIONS:  MEDICATIONS  (STANDING):  albuterol/ipratropium for Nebulization 3 milliLiter(s) Nebulizer every 6 hours  amLODIPine   Tablet 5 milliGRAM(s) Oral daily  chlorhexidine 0.12% Liquid 15 milliLiter(s) Oral Mucosa two times a day  chlorhexidine 2% Cloths 1 Application(s) Topical daily  clonazePAM  Tablet 2 milliGRAM(s) Oral every 8 hours  heparin   Injectable 5000 Unit(s) SubCutaneous every 12 hours  influenza   Vaccine 0.5 milliLiter(s) IntraMuscular once  levETIRAcetam  Solution 500 milliGRAM(s) Oral two times a day  nystatin    Suspension 343191 Unit(s) Oral four times a day  pantoprazole   Suspension 40 milliGRAM(s) Oral every 12 hours    MEDICATIONS  (PRN):  acetaminophen   Oral Liquid .. 650 milliGRAM(s) Oral every 6 hours PRN Temp greater or equal to 38C (100.4F), Mild Pain (1 - 3)  HYDROmorphone  Injectable 0.5 milliGRAM(s) IV Push every 4 hours PRN dysynchronny      LABS:                        6.7    21.49 )-----------( 321      ( 04 May 2025 06:10 )             20.6     05-04    145  |  112[H]  |  70[H]  ----------------------------<  147[H]  4.1   |  18[L]  |  1.46[H]    Ca    10.5      04 May 2025 06:10  Phos  3.4     05-04  Mg     2.50     05-04    TPro  7.9  /  Alb  2.9[L]  /  TBili  2.3[H]  /  DBili  1.5[H]  /  AST  104[H]  /  ALT  107[H]  /  AlkPhos  655[H]  05-04      Urinalysis Basic - ( 04 May 2025 06:10 )    Color: x / Appearance: x / SG: x / pH: x  Gluc: 147 mg/dL / Ketone: x  / Bili: x / Urobili: x   Blood: x / Protein: x / Nitrite: x   Leuk Esterase: x / RBC: x / WBC x   Sq Epi: x / Non Sq Epi: x / Bacteria: x        Venous Blood Gas:  05-05 @ 05:10  7.34/41/52/22/84.7  VBG Lactate: 1.5  Venous Blood Gas:  05-04 @ 06:10  7.39/35/145/21/99.2  VBG Lactate: 1.8   CHIEF COMPLAINT: Patient is a 44y old  Male who presents with a chief complaint of Referred by Hematologist for low Hg (04 May 2025 19:05)    INTERVAL EVENTS: no acute events noted overnight    ROS: unable to complete the ROS pt is anoxic     OBJECTIVE:  ICU Vital Signs Last 24 Hrs  T(C): 37.4 (05 May 2025 05:05), Max: 37.8 (04 May 2025 17:29)  T(F): 99.4 (05 May 2025 05:05), Max: 100.1 (04 May 2025 17:29)  HR: 115 (05 May 2025 07:23) (110 - 130)  BP: 122/72 (05 May 2025 05:05) (115/66 - 138/79)  BP(mean): --  ABP: --  ABP(mean): --  RR: 20 (05 May 2025 05:05) (18 - 20)  SpO2: 100% (05 May 2025 07:23) (99% - 100%)    O2 Parameters below as of 05 May 2025 05:05  Patient On (Oxygen Delivery Method): ventilator    O2 Concentration (%): 30      Mode: AC/ CMV (Assist Control/ Continuous Mandatory Ventilation), RR (machine): 20, TV (machine): 460, FiO2: 30, PEEP: 6, ITime: 0.84, MAP: 11, PIP: 21    05-04 @ 07:01  -  05-05 @ 07:00  --------------------------------------------------------  IN: 2675 mL / OUT: 1700 mL / NET: 975 mL      CAPILLARY BLOOD GLUCOSE      POCT Blood Glucose.: 158 mg/dL (04 May 2025 21:57)      PHYSICAL EXAM:  General: no acute distress  Neck: trach tube in place  Respiratory: clear to auscultation b/l, no wheezing, rales or rhonchi   Cardiovascular: RRR, no murmurs, rubs or gallops    Abdomen: soft, nontender and non distended. G tube in place.     Extremities:     Skin: warm to touch, normal color.  Neurological: anoxic. Awake with eyes open    Mode: AC/ CMV (Assist Control/ Continuous Mandatory Ventilation)  RR (machine): 20  TV (machine): 460  FiO2: 30  PEEP: 6  ITime: 0.84  MAP: 11  PIP: 21      HOSPITAL MEDICATIONS:  MEDICATIONS  (STANDING):  albuterol/ipratropium for Nebulization 3 milliLiter(s) Nebulizer every 6 hours  amLODIPine   Tablet 5 milliGRAM(s) Oral daily  chlorhexidine 0.12% Liquid 15 milliLiter(s) Oral Mucosa two times a day  chlorhexidine 2% Cloths 1 Application(s) Topical daily  clonazePAM  Tablet 2 milliGRAM(s) Oral every 8 hours  heparin   Injectable 5000 Unit(s) SubCutaneous every 12 hours  influenza   Vaccine 0.5 milliLiter(s) IntraMuscular once  levETIRAcetam  Solution 500 milliGRAM(s) Oral two times a day  nystatin    Suspension 831546 Unit(s) Oral four times a day  pantoprazole   Suspension 40 milliGRAM(s) Oral every 12 hours    MEDICATIONS  (PRN):  acetaminophen   Oral Liquid .. 650 milliGRAM(s) Oral every 6 hours PRN Temp greater or equal to 38C (100.4F), Mild Pain (1 - 3)  HYDROmorphone  Injectable 0.5 milliGRAM(s) IV Push every 4 hours PRN dysynchronny      LABS:                        6.7    21.49 )-----------( 321      ( 04 May 2025 06:10 )             20.6     05-04    145  |  112[H]  |  70[H]  ----------------------------<  147[H]  4.1   |  18[L]  |  1.46[H]    Ca    10.5      04 May 2025 06:10  Phos  3.4     05-04  Mg     2.50     05-04    TPro  7.9  /  Alb  2.9[L]  /  TBili  2.3[H]  /  DBili  1.5[H]  /  AST  104[H]  /  ALT  107[H]  /  AlkPhos  655[H]  05-04      Urinalysis Basic - ( 04 May 2025 06:10 )    Color: x / Appearance: x / SG: x / pH: x  Gluc: 147 mg/dL / Ketone: x  / Bili: x / Urobili: x   Blood: x / Protein: x / Nitrite: x   Leuk Esterase: x / RBC: x / WBC x   Sq Epi: x / Non Sq Epi: x / Bacteria: x        Venous Blood Gas:  05-05 @ 05:10  7.34/41/52/22/84.7  VBG Lactate: 1.5  Venous Blood Gas:  05-04 @ 06:10  7.39/35/145/21/99.2  VBG Lactate: 1.8   CHIEF COMPLAINT: Patient is a 44y old  Male who presents with a chief complaint of Referred by Hematologist for low Hg (04 May 2025 19:05)    INTERVAL EVENTS: no acute events noted overnight    ROS: unable to complete the ROS pt is anoxic     OBJECTIVE:  ICU Vital Signs Last 24 Hrs  T(C): 37.4 (05 May 2025 05:05), Max: 37.8 (04 May 2025 17:29)  T(F): 99.4 (05 May 2025 05:05), Max: 100.1 (04 May 2025 17:29)  HR: 115 (05 May 2025 07:23) (110 - 130)  BP: 122/72 (05 May 2025 05:05) (115/66 - 138/79)  BP(mean): --  ABP: --  ABP(mean): --  RR: 20 (05 May 2025 05:05) (18 - 20)  SpO2: 100% (05 May 2025 07:23) (99% - 100%)    O2 Parameters below as of 05 May 2025 05:05  Patient On (Oxygen Delivery Method): ventilator    O2 Concentration (%): 30      Mode: AC/ CMV (Assist Control/ Continuous Mandatory Ventilation), RR (machine): 20, TV (machine): 460, FiO2: 30, PEEP: 6, ITime: 0.84, MAP: 11, PIP: 21    05-04 @ 07:01  -  05-05 @ 07:00  --------------------------------------------------------  IN: 2675 mL / OUT: 1700 mL / NET: 975 mL      CAPILLARY BLOOD GLUCOSE      POCT Blood Glucose.: 158 mg/dL (04 May 2025 21:57)      PHYSICAL EXAM:  General: no acute distress  Neck: trach tube in place  Respiratory: clear to auscultation b/l, no wheezing, rales or rhonchi   Cardiovascular: RRR, no murmurs, rubs or gallops    Abdomen: soft, nontender and non distended. G tube in place.   Extremities:   Skin: warm to touch, normal color.  Neurological: anoxic. Awake with eyes open    Mode: AC/ CMV (Assist Control/ Continuous Mandatory Ventilation)  RR (machine): 20  TV (machine): 460  FiO2: 30  PEEP: 6  ITime: 0.84  MAP: 11  PIP: 21      HOSPITAL MEDICATIONS:  MEDICATIONS  (STANDING):  albuterol/ipratropium for Nebulization 3 milliLiter(s) Nebulizer every 6 hours  amLODIPine   Tablet 5 milliGRAM(s) Oral daily  chlorhexidine 0.12% Liquid 15 milliLiter(s) Oral Mucosa two times a day  chlorhexidine 2% Cloths 1 Application(s) Topical daily  clonazePAM  Tablet 2 milliGRAM(s) Oral every 8 hours  heparin   Injectable 5000 Unit(s) SubCutaneous every 12 hours  influenza   Vaccine 0.5 milliLiter(s) IntraMuscular once  levETIRAcetam  Solution 500 milliGRAM(s) Oral two times a day  nystatin    Suspension 883164 Unit(s) Oral four times a day  pantoprazole   Suspension 40 milliGRAM(s) Oral every 12 hours    MEDICATIONS  (PRN):  acetaminophen   Oral Liquid .. 650 milliGRAM(s) Oral every 6 hours PRN Temp greater or equal to 38C (100.4F), Mild Pain (1 - 3)  HYDROmorphone  Injectable 0.5 milliGRAM(s) IV Push every 4 hours PRN dysynchronny      LABS:                        6.7    21.49 )-----------( 321      ( 04 May 2025 06:10 )             20.6     05-04    145  |  112[H]  |  70[H]  ----------------------------<  147[H]  4.1   |  18[L]  |  1.46[H]    Ca    10.5      04 May 2025 06:10  Phos  3.4     05-04  Mg     2.50     05-04    TPro  7.9  /  Alb  2.9[L]  /  TBili  2.3[H]  /  DBili  1.5[H]  /  AST  104[H]  /  ALT  107[H]  /  AlkPhos  655[H]  05-04      Urinalysis Basic - ( 04 May 2025 06:10 )    Color: x / Appearance: x / SG: x / pH: x  Gluc: 147 mg/dL / Ketone: x  / Bili: x / Urobili: x   Blood: x / Protein: x / Nitrite: x   Leuk Esterase: x / RBC: x / WBC x   Sq Epi: x / Non Sq Epi: x / Bacteria: x        Venous Blood Gas:  05-05 @ 05:10  7.34/41/52/22/84.7  VBG Lactate: 1.5  Venous Blood Gas:  05-04 @ 06:10  7.39/35/145/21/99.2  VBG Lactate: 1.8   CHIEF COMPLAINT: Patient is a 44y old  Male who presents with a chief complaint of Referred by Hematologist for low Hg (04 May 2025 19:05)    INTERVAL EVENTS: no acute events noted overnight    ROS: unable to complete the ROS pt is anoxic     OBJECTIVE:  ICU Vital Signs Last 24 Hrs  T(C): 37.4 (05 May 2025 05:05), Max: 37.8 (04 May 2025 17:29)  T(F): 99.4 (05 May 2025 05:05), Max: 100.1 (04 May 2025 17:29)  HR: 115 (05 May 2025 07:23) (110 - 130)  BP: 122/72 (05 May 2025 05:05) (115/66 - 138/79)  BP(mean): --  ABP: --  ABP(mean): --  RR: 20 (05 May 2025 05:05) (18 - 20)  SpO2: 100% (05 May 2025 07:23) (99% - 100%)    O2 Parameters below as of 05 May 2025 05:05  Patient On (Oxygen Delivery Method): ventilator    O2 Concentration (%): 30      Mode: AC/ CMV (Assist Control/ Continuous Mandatory Ventilation), RR (machine): 20, TV (machine): 460, FiO2: 30, PEEP: 6, ITime: 0.84, MAP: 11, PIP: 21    05-04 @ 07:01  -  05-05 @ 07:00  --------------------------------------------------------  IN: 2675 mL / OUT: 1700 mL / NET: 975 mL      CAPILLARY BLOOD GLUCOSE      POCT Blood Glucose.: 158 mg/dL (04 May 2025 21:57)      PHYSICAL EXAM:  General: no acute distress  Neck: trach tube in place  Respiratory: clear to auscultation b/l, no wheezing, rales or rhonchi   Cardiovascular: RRR, no murmurs, rubs or gallops    Abdomen: soft, nontender and non distended. G tube in place.   Extremities: no LE edema b/l  Skin: warm to touch, normal color.  Neurological: anoxic. Awake with eyes open. Does not track or follow commands     Mode: AC/ CMV (Assist Control/ Continuous Mandatory Ventilation)  RR (machine): 20  TV (machine): 460  FiO2: 30  PEEP: 6  ITime: 0.84  MAP: 11  PIP: 21      HOSPITAL MEDICATIONS:  MEDICATIONS  (STANDING):  albuterol/ipratropium for Nebulization 3 milliLiter(s) Nebulizer every 6 hours  amLODIPine   Tablet 5 milliGRAM(s) Oral daily  chlorhexidine 0.12% Liquid 15 milliLiter(s) Oral Mucosa two times a day  chlorhexidine 2% Cloths 1 Application(s) Topical daily  clonazePAM  Tablet 2 milliGRAM(s) Oral every 8 hours  heparin   Injectable 5000 Unit(s) SubCutaneous every 12 hours  influenza   Vaccine 0.5 milliLiter(s) IntraMuscular once  levETIRAcetam  Solution 500 milliGRAM(s) Oral two times a day  nystatin    Suspension 864029 Unit(s) Oral four times a day  pantoprazole   Suspension 40 milliGRAM(s) Oral every 12 hours    MEDICATIONS  (PRN):  acetaminophen   Oral Liquid .. 650 milliGRAM(s) Oral every 6 hours PRN Temp greater or equal to 38C (100.4F), Mild Pain (1 - 3)  HYDROmorphone  Injectable 0.5 milliGRAM(s) IV Push every 4 hours PRN dysynchronny      LABS:                        6.7    21.49 )-----------( 321      ( 04 May 2025 06:10 )             20.6     05-04    145  |  112[H]  |  70[H]  ----------------------------<  147[H]  4.1   |  18[L]  |  1.46[H]    Ca    10.5      04 May 2025 06:10  Phos  3.4     05-04  Mg     2.50     05-04    TPro  7.9  /  Alb  2.9[L]  /  TBili  2.3[H]  /  DBili  1.5[H]  /  AST  104[H]  /  ALT  107[H]  /  AlkPhos  655[H]  05-04      Urinalysis Basic - ( 04 May 2025 06:10 )    Color: x / Appearance: x / SG: x / pH: x  Gluc: 147 mg/dL / Ketone: x  / Bili: x / Urobili: x   Blood: x / Protein: x / Nitrite: x   Leuk Esterase: x / RBC: x / WBC x   Sq Epi: x / Non Sq Epi: x / Bacteria: x        Venous Blood Gas:  05-05 @ 05:10  7.34/41/52/22/84.7  VBG Lactate: 1.5  Venous Blood Gas:  05-04 @ 06:10  7.39/35/145/21/99.2  VBG Lactate: 1.8

## 2025-05-05 NOTE — PROGRESS NOTE ADULT - NS ATTEND AMEND GEN_ALL_CORE FT
Pt is a 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 transfusion dependence c/b iron overload with cirrhosis, HTN, and RUE DVT (12/2024) on NOAC presenting to [facility] on 3/15/25 from hematology office for vaso-occlusive sickle cell crisis with acute chest syndrome and hypoxemic respiratory failure with hospital course c/b left renal mass s/p left ureteroscopy with biopsy/stent placement (3/19) c/b acute on chronic anemia 2/2 hemorrhagic shock from left perirenal-subcapsular hemorrhage c/b PEA arrest with ROSC (3/20, unclear downtime) s/p emergent IR embolization but c/b severe post-cardiac arrest hypoxic-ischemic encephalopathy with failure to wean from ventilator s/p tracheostomy placement (4/4/25) and RCU transfer 4/18 for further medical management.     Pt with concern for hypoxic-ischemic encephalopathy post-cardiac arrest. Pt with some brainstem reflexes but otherwise remains unresponsive with no purposeful consciousness and functional quadriplegia with full dependence on ADLs. Pt with previous episodes of myoclonus. CT and MRI imaging with diffuse cortical injury. EEG with GPD vs. myoclonus, but also with severe diffuse/multifocal cerebral dysfunction. Neurology consulted, recs appreciated. Concern for poor neurologic prognosis with likely persistent vegetative state vs. minimal consciousness. Discussed with pt's family along with palliative and neurology team. Pt continues on Keppra + clonazepam + prn dilaudid, myoclonus improving. Central fevers resolved, will monitor off bromocriptine 2/2 concern for transaminitis.     Pt with acute combined hypoxemic and hypercapnic respiratory failure with failure to wean from ventilator s/p tracheostomy placement (#7 Portex 4/4).  Will continue to wean vent as tolerated. Airway clearance therapy in place. Wean O2 supplementation for goal O2 saturation 90-95%. Aspiration precautions and oral hygiene in place. Trach care and suctioning as per RCU routine. Hospital course c/b tongue biting with evidence of tongue trauma now s/p bite block placement by OMFS followed by dental mouthguard mold creation (5/2) to arrive by 5/16.    Pt with sickle cell dz with hx VOC and intermittent transfusion dependence previously on deferasirox and Voxelotor, now off all maintenance therapy. Hemolysis labs stable. Free water in place. Superimposed hemorrhagic shock now resolved, last pRBC ~4/22. Discussed with hematology, would not restart medications at this time. Hospital course further c/b RIJ, left brachial, and LLE gastrocnemius DVT i/s/o chronic VTEs previously on NOAC. Pt has had multiple life-threatening bleeding while in hospital on A/C, will hold for now. Tolerating DVT ppx. Repeat serial duplex 5/5 to check for propagation. Discussed with IR team, not a candidate for IVCF at this time.     Pt with oropharyngeal dysphagia s/p PEG placement now tolerating feeds at goal rate. Bowel regimen in place prn. PPI ppx. No renal or endocrine issues.     Pt further with newly diagnosed left renal mass non-invasive urothelial carcinoma. As per hematology, given overall co-morbidities and multiorgan failure, pt is currently not a candidate for DMT.    Dispo pending medical optimization. Pt full code. DVT ppx in place. Palliative consulted, recs appreciated. Will continue to update family and discuss plan of care throughout hospitalization.

## 2025-05-05 NOTE — PROGRESS NOTE ADULT - SUBJECTIVE AND OBJECTIVE BOX
Dental consulted to reposition displaced bite block due to tongue biting.   Bite block repositioned on patient's right side and tongue is relieved from occlusion.   Patient being followed by dentistry for nightguard delivery.           Lisha Perez DDS #[id number]

## 2025-05-05 NOTE — PROGRESS NOTE ADULT - ASSESSMENT
43 YO M with PMHx of sickle cell disease with prior acute chest syndrome requiring transfusion and exchange in the past (last 12/2024), iron overload, gout, HTN, and RUE DVT in 12/2024 on eliquis who presented on 3/15 by his hematologist second to anemia and hypoxia, down to 5.3 from baseline 7-8, with concern for vaso-occlusive crisis. While on medicine, anemia improved post transfusion and continued on SCC pain control PCA. Patient noted with left renal mass during prior admission and s/p L ureteroscopy with biopsy and stent placement on 3/19. Course complicated with hypoglycemia on 3/20 with RRT x 2 and found with severe anemia to 3.4 with hemorrhagic shock second to left perirenal and subcapsular hemorrhage and ultimately PEA arrest. ROSC achieved and transferred to MICU. s/p IR emobilization and course complicated anoxic brain injury, myoclonic jerks, GIB, MATA, HAGMA, DVTs (R IJ and LUE brachial), dysphagia, cirrhosis, and prolonged vent time s/p trach on 4/4. Transferred to [facility] on 4/18.     NEUROLOGY  # Anoxic brain injury   - s/p cardiac arrest on 3/20 with ROSC in 4 minutes   - CT HEAD post arrest with diffuse sulcal effacement with increased intracranial pressure, and few patchy foci of cortical blurring concern for HIE  - MRI brain post arrest with with diffuse global abnormal supratentorial cortical restricted diffusion consistent with global hypoxic injury   - Monitor for now    # Seizures vs myoclonic jerks   - Witness myoclonic jerking concerning for seizures with anoxic brain injury  - vEEG with abundant myoclonic seizures in the setting of a severe diffuse/multifocal cerebral dysfunction which can be seen in the setting of sedative effect or due to anoxia  - s/p valium 10 Q4H   - Continue on keppra   - Continue on klonopin 2mg TID   - Continue on dilaudid PRN     # Central fevers   - Persistent fever spikes noted with concern for central fevers   - Bromocriptine started on 4/23, however noted with rise in LFTs and now stopped on 4/27   - Monitor fever curve.     HEENT  # Tongue biting   - Noted with tongue trauma  - s/p bite block replacement by OMFS on 4/23  - dental mouthguard mold creation on 5/2 -  Order scheduled to arrive Friday, May 16th. if discharged will need to follow up outpatient per dental.  - Continue with mouth care     # Thrush   - Continue on nystatin (4/28 - 5/4)   - Continue with mouth care     CARDIOLOGY  # Shock state likely hemorrhagic vs vasoplegia  - Acute renal bleed noted requiring multiple transfusions and pressors in ICU.   - TTE 3/22 with EF 63 with normal LVRVSF with PASP 49  - Weaned off pressors in ICU and complicated by hypertension.   - Continue on norvasc 5 QD   - Monitor BP     # Autonomic vs pain  - Mild HTN and tachypnea noted likely pain induced vs autonomia?   - Continue on dilaudid pushes PRN    PULMONARY  # Respiratory failure   - Presented with SCC and hypoxia likely from anemia with no signs of acute chest, however noted with PEA with hemorrhagic shock post renal bx requiring intubation.   - Prolonged intubation and s/p 7 PORTEX trach on 4/4   - Continue on vent 20/460/6/30 with nebs and chest PT  - Continue on dilaudid PRN for dyssynchrony     GI  # Shock liver vs cirrhosis with sickle cell   - Transaminitis noted in ICU and thought to be second to shock liver likely second to hemorrhagic shock vs cardiac arrest, however LFTs remains elevated with acute on chronic hyperbilirubinemia.   - US ABD suggestive of early cirrhotic changes, cholecystectomy, and CBD 9mm   - CT AP with cirrhosis   - LFTs rising with bromocriptine   - Holding further bromocriptine   - Trend LFTs and bili     # GIB  - Anemia with GIB noted in ICU   - s/p EGD 4/11 with cauterization of gastric ulcer & hemostatic spraying of three duodenal ulcers. Gastric ulcer likely due to NGT trauma.   - Continue on PPI BID   - Monitor stools and HH     # Oropharyngeal dysphagia   - GI unable to place PEG due to hepatomegaly.   - IR unable to place PEG second to no safe window   - s/p open G TUBE placement on 4/18   - Continue on TF    RENAL  # Acute renal failure and HAGMA second to shock state   - Scr on admission was 1.25 (3/15/25) and increased to 6s while in ICU and s/p HD 3/24-3/29 and then again on 4/4.   - L SHILEY removed on 4/15   - Continue on HCO3 1300 TID tabs with improved acidosis, however hypernatremic and stopped.   - Monitor renal function, acidosis, and UOP     # L renal mass bx c/b hematoma   - Renal bx as below on 3/19  - Hemorrhagic shock and PEA arrest on 3/20   - Renal US with large L renal subcapsular hematoma.    - IR left renal angiogram and embolization on 3/20   - CT 4/6 with unchanged large left subcapsular and perinephric hematoma.  - CT A/P (4/20) showing stable subcapsular renal hematoma and NO new active bleeds.  - Monitor for now    # Urinary retention   - Passed TOV on 4/23   - Urinary retention again and likely neurogenic bladder   - Will place daniel today and hold further TOVs    # Hypernatremia  - s/p D5W   - Continue on  Q6H however sodium worsening   - Continue on FWF with no improvement.   - Continue on D5 yesterday, however IV trouble noted and sodium rising today.   - Hold further HCO3 tabs and rehydrate again with D5 and  Q6H   - sodium improving with D5 fluids and FWF  - continue to monitor     # Hypercalcemia likely from dehydration   - Rehydrate as above and monitor Ca     # Hyperphosphatemia   - Phos elevation likely second to renal failure   - PTH elevated, however Phos slowly down trending   - Monitor phos    INFECTIOUS DISEASE   # Central fevers  - Recurrent fevers and complete LVQ empirically as below   - Started on bromocriptine with improvement in fever curve, however noted with transaminitis and bromocriptine stopped with return of elevated temperature intermittently.   - Patient with known R IJ DVT and L kidney hematoma and now new LLE DVT that can cause intermittent fevers.   - Hold work up for now   - Hold further ABX for now   - Monitor fever curve.     # Questionable UTI  - Recurrent fevers with POSITIVE UA and s/p LVQ (4/23 - 4/28)   - Monitor off ABX     # VAP   - Post arrest noted with concern for aspiration and VAP in ICU   - Flu, SCx, BAL, BCx and UCx negative   - MRSA PCR with MSSA and completed bactroban in ICU   - Completed aztreonam (3/22-3/27) then casey (3/27-3/31) and vanco by dose in ICU   - CXR in ICU with RUL consolidation and completed recurrent casey course in ICU (4/2-4/7).     HEMATOLOGIC  # Anemia with SCC vs L kidney bleed vs GIB   - Baseline hemoglobin outpatient ~6.0-7.0 and sent in for anemia down to 5.3 without signs of bleeding and more likely SCC.    - Transfusion given on admission and improved back to baseline.   - Course complicated by hemorrhagic shock from left renal hematoma and HH down to 3.4.  - s/p 10U PRBC total while in ICU   - s/p PRBC pre-GTUBE with HH increased to 8.0  - c/b rapid decline in HH post GTUBE likely normalizing to baseline 6-7 as RPT CT AP post G TUBE with new bleeds and no acute signs of sickling.   - s/p PRBC 4/22   - Monitor HH     # SCD   - Discontinued deferasirox due to current renal failure   - Monitor Sickle labs QD (CBC with Diff, Retic, BMP, Hepatic Function, LDH and Hapto, and VBG).     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   - Eliquis held outpatient in anticipation for bx   - Eliquis switched to LVX and then held for bx in house   - Course complicated by multiple bleeds in ICU and challenged on heparin GTT  - VA DOPPLER 5/1 with acute LLE Gastrocnemius DVT   - Overall transfusion dependent and no further FULL AC    - Discussed with IR and recommend holding IVC filter for now  - Continue on DVT PPX HSQ and RPT DOPPLER on 5/5  - LUE and LLE precautions     # LUE arm infiltration   - HCO3 GTT infiltration in ICU   - Seen by hand sx and no compartment syndrome concern   - Monitor for now     # RUE blood infiltration   - RUE blood transfusion infiltration noted on 4/17 with area of ecchymosis   - RPT DOPPLER with known R IJ DVT, however no upper arm thrombus or issues in area of infiltration  - Monitor site for now    ENDOCRINE   # Glycemic control   - Monitor BG   - No hx of DM2     SKIN   - Wound care reccs appreciated    ETHICS   - FULL CODE     DISPO - vent facility when able 43 YO M with PMHx of sickle cell disease with prior acute chest syndrome requiring transfusion and exchange in the past (last 12/2024), iron overload, gout, HTN, and RUE DVT in 12/2024 on eliquis who presented on 3/15 by his hematologist second to anemia and hypoxia, down to 5.3 from baseline 7-8, with concern for vaso-occlusive crisis. While on medicine, anemia improved post transfusion and continued on SCC pain control PCA. Patient noted with left renal mass during prior admission and s/p L ureteroscopy with biopsy and stent placement on 3/19. Course complicated with hypoglycemia on 3/20 with RRT x 2 and found with severe anemia to 3.4 with hemorrhagic shock second to left perirenal and subcapsular hemorrhage and ultimately PEA arrest. ROSC achieved and transferred to MICU. s/p IR emobilization and course complicated anoxic brain injury, myoclonic jerks, GIB, MATA, HAGMA, DVTs (R IJ and LUE brachial), dysphagia, cirrhosis, and prolonged vent time s/p trach on 4/4. Transferred to [facility] on 4/18.     NEUROLOGY  # Anoxic brain injury   - s/p cardiac arrest on 3/20 with ROSC in 4 minutes   - CT HEAD post arrest with diffuse sulcal effacement with increased intracranial pressure, and few patchy foci of cortical blurring concern for HIE  - MRI brain post arrest with with diffuse global abnormal supratentorial cortical restricted diffusion consistent with global hypoxic injury   - Monitor for now    # Seizures vs myoclonic jerks   - Witness myoclonic jerking concerning for seizures with anoxic brain injury  - vEEG with abundant myoclonic seizures in the setting of a severe diffuse/multifocal cerebral dysfunction which can be seen in the setting of sedative effect or due to anoxia  - s/p valium 10 Q4H   - Continue on keppra   - Continue on klonopin 2mg TID   - Continue on dilaudid PRN     # Central fevers   - Persistent fever spikes noted with concern for central fevers   - Bromocriptine started on 4/23, however noted with rise in LFTs and now stopped on 4/27   - Monitor fever curve.     HEENT  # Tongue biting   - Noted with tongue trauma  - s/p bite block replacement by OMFS on 4/23  - dental mouthguard mold creation on 5/2 -  Order scheduled to arrive Friday, May 16th. if discharged will need to follow up outpatient per dental.  - Continue with mouth care     # Thrush   - Continue on nystatin (4/28 - 5/4)   - Continue with mouth care     CARDIOLOGY  # Shock state likely hemorrhagic vs vasoplegia  - Acute renal bleed noted requiring multiple transfusions and pressors in ICU.   - TTE 3/22 with EF 63 with normal LVRVSF with PASP 49  - Weaned off pressors in ICU and complicated by hypertension.   - Continue on norvasc 5 QD   - Monitor BP     # Autonomic vs pain  - Mild HTN and tachypnea noted likely pain induced vs autonomia?   - Continue on dilaudid pushes PRN    PULMONARY  # Respiratory failure   - Presented with SCC and hypoxia likely from anemia with no signs of acute chest, however noted with PEA with hemorrhagic shock post renal bx requiring intubation.   - Prolonged intubation and s/p 7 PORTEX trach on 4/4   - Continue on vent 20/460/6/30 with nebs and chest PT  - Continue on dilaudid PRN for dyssynchrony     GI  # Shock liver vs cirrhosis with sickle cell   - Transaminitis noted in ICU and thought to be second to shock liver likely second to hemorrhagic shock vs cardiac arrest, however LFTs remains elevated with acute on chronic hyperbilirubinemia.   - US ABD suggestive of early cirrhotic changes, cholecystectomy, and CBD 9mm   - CT AP with cirrhosis   - LFTs rising with bromocriptine   - Holding further bromocriptine   - Trend LFTs and bili     # GIB  - Anemia with GIB noted in ICU   - s/p EGD 4/11 with cauterization of gastric ulcer & hemostatic spraying of three duodenal ulcers. Gastric ulcer likely due to NGT trauma.   - Continue on PPI BID   - Monitor stools and HH     # Oropharyngeal dysphagia   - GI unable to place PEG due to hepatomegaly.   - IR unable to place PEG second to no safe window   - s/p open G TUBE placement on 4/18   - Continue on TF    RENAL  # Acute renal failure and HAGMA second to shock state   - Scr on admission was 1.25 (3/15/25) and increased to 6s while in ICU and s/p HD 3/24-3/29 and then again on 4/4.   - L SHILEY removed on 4/15   - Continue on HCO3 1300 TID tabs with improved acidosis, however hypernatremic and stopped.   - Monitor renal function, acidosis, and UOP     # L renal mass bx c/b hematoma   - Renal bx as below on 3/19  - Hemorrhagic shock and PEA arrest on 3/20   - Renal US with large L renal subcapsular hematoma.    - IR left renal angiogram and embolization on 3/20   - CT 4/6 with unchanged large left subcapsular and perinephric hematoma.  - CT A/P (4/20) showing stable subcapsular renal hematoma and NO new active bleeds.  - Monitor for now    # Urinary retention   - Passed TOV on 4/23   - Urinary retention again and likely neurogenic bladder   - Dave in place and hold further TOVs    # Hypernatremia  - s/p D5W   - Continue on  Q6H however sodium worsening   - Continue on FWF with no improvement.   - Hold further HCO3 tabs and rehydrate again with D5 and  Q6H   - sodium improving with D5 fluids and FWF continue to monitor     # Hypercalcemia likely from dehydration   - Rehydrate as above and monitor Ca     # Hyperphosphatemia   - Phos elevation likely second to renal failure   - PTH elevated, however Phos slowly down trending   - Monitor phos    INFECTIOUS DISEASE   # Central fevers  - Recurrent fevers and complete LVQ empirically as below   - Started on bromocriptine with improvement in fever curve, however noted with transaminitis and bromocriptine stopped with return of elevated temperature intermittently.   - Patient with known R IJ DVT and L kidney hematoma and now new LLE DVT that can cause intermittent fevers.   - Hold work up for now   - Hold further ABX for now   - Monitor fever curve.     # Questionable UTI  - Recurrent fevers with POSITIVE UA and s/p LVQ (4/23 - 4/28)   - Monitor off ABX     # VAP   - Post arrest noted with concern for aspiration and VAP in ICU   - Flu, SCx, BAL, BCx and UCx negative   - MRSA PCR with MSSA and completed bactroban in ICU   - Completed aztreonam (3/22-3/27) then casey (3/27-3/31) and vanco by dose in ICU   - CXR in ICU with RUL consolidation and completed recurrent casey course in ICU (4/2-4/7).     HEMATOLOGIC  # Anemia with SCC vs L kidney bleed vs GIB   - Baseline hemoglobin outpatient ~6.0-7.0 and sent in for anemia down to 5.3 without signs of bleeding and more likely SCC.    - Transfusion given on admission and improved back to baseline.   - Course complicated by hemorrhagic shock from left renal hematoma and HH down to 3.4.  - s/p 10U PRBC total while in ICU   - s/p PRBC pre-GTUBE with HH increased to 8.0  - c/b rapid decline in HH post GTUBE likely normalizing to baseline 6-7 as RPT CT AP post G TUBE with new bleeds and no acute signs of sickling.   - s/p PRBC 4/22   - Monitor HH     # SCD   - Discontinued deferasirox due to current renal failure   - Monitor Sickle labs QD (CBC with Diff, Retic, BMP, Hepatic Function, LDH and Hapto, and VBG).     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   - Eliquis held outpatient in anticipation for bx   - Eliquis switched to LVX and then held for bx in house   - Course complicated by multiple bleeds in ICU and challenged on heparin GTT  - VA DOPPLER 5/1 with acute LLE Gastrocnemius DVT   - Overall transfusion dependent and no further FULL AC    - Discussed with IR and recommend holding IVC filter for now  - Continue on DVT PPX HSQ and RPT DOPPLER on 5/5  - LUE and LLE precautions     # LUE arm infiltration   - HCO3 GTT infiltration in ICU   - Seen by hand sx and no compartment syndrome concern   - Monitor for now     # RUE blood infiltration   - RUE blood transfusion infiltration noted on 4/17 with area of ecchymosis   - RPT DOPPLER with known R IJ DVT, however no upper arm thrombus or issues in area of infiltration  - Monitor site for now    ENDOCRINE   # Glycemic control   - Monitor BG   - No hx of DM2     SKIN   - Wound care reccs appreciated    ETHICS   - FULL CODE     DISPO - vent facility when able 43 YO M with PMHx of sickle cell disease with prior acute chest syndrome requiring transfusion and exchange in the past (last 12/2024), iron overload, gout, HTN, and RUE DVT in 12/2024 on eliquis who presented on 3/15 by his hematologist second to anemia and hypoxia, down to 5.3 from baseline 7-8, with concern for vaso-occlusive crisis. While on medicine, anemia improved post transfusion and continued on SCC pain control PCA. Patient noted with left renal mass during prior admission and s/p L ureteroscopy with biopsy and stent placement on 3/19. Course complicated with hypoglycemia on 3/20 with RRT x 2 and found with severe anemia to 3.4 with hemorrhagic shock second to left perirenal and subcapsular hemorrhage and ultimately PEA arrest. ROSC achieved and transferred to MICU. s/p IR emobilization and course complicated anoxic brain injury, myoclonic jerks, GIB, MATA, HAGMA, DVTs (R IJ and LUE brachial), dysphagia, cirrhosis, and prolonged vent time s/p trach on 4/4. Transferred to [facility] on 4/18.     NEUROLOGY  # Anoxic brain injury   - s/p cardiac arrest on 3/20 with ROSC in 4 minutes   - CT HEAD post arrest with diffuse sulcal effacement with increased intracranial pressure, and few patchy foci of cortical blurring concern for HIE  - MRI brain post arrest with with diffuse global abnormal supratentorial cortical restricted diffusion consistent with global hypoxic injury   - Monitor for now    # Seizures vs myoclonic jerks   - Witness myoclonic jerking concerning for seizures with anoxic brain injury  - vEEG with abundant myoclonic seizures in the setting of a severe diffuse/multifocal cerebral dysfunction which can be seen in the setting of sedative effect or due to anoxia  - s/p valium 10 Q4H   - Continue on keppra   - Continue on klonopin 2mg TID   - Continue on dilaudid PRN     # Central fevers   - Persistent fever spikes noted with concern for central fevers   - Bromocriptine started on 4/23, however noted with rise in LFTs and now stopped on 4/27   - Monitor fever curve.     HEENT  # Tongue biting   - Noted with tongue trauma  - s/p bite block replacement by OMFS on 4/23  - dental mouthguard mold creation on 5/2 -  Order scheduled to arrive Friday, May 16th. if discharged will need to follow up outpatient per dental.  - dental will continue with mouth care and reposition bite block     # Thrush   - On nystatin (4/28 - 5/4)   - Continue with mouth care     CARDIOLOGY  # Shock state likely hemorrhagic vs vasoplegia  - Acute renal bleed noted requiring multiple transfusions and pressors in ICU.   - TTE 3/22 with EF 63 with normal LVRVSF with PASP 49  - Weaned off pressors in ICU and complicated by hypertension.   - Continue on norvasc 5 QD   - Monitor BP     # Autonomic vs pain  - Mild HTN and tachypnea noted likely pain induced vs autonomia?   - Continue on dilaudid pushes PRN    PULMONARY  # Respiratory failure   - Presented with SCC and hypoxia likely from anemia with no signs of acute chest, however noted with PEA with hemorrhagic shock post renal bx requiring intubation.   - Prolonged intubation and s/p 7 PORTEX trach on 4/4   - Continue on vent 20/460/6/30 with nebs and chest PT  - Continue on dilaudid PRN for dyssynchrony     GI  # Shock liver vs cirrhosis with sickle cell   - Transaminitis noted in ICU and thought to be second to shock liver likely second to hemorrhagic shock vs cardiac arrest, however LFTs remains elevated with acute on chronic hyperbilirubinemia.   - US ABD suggestive of early cirrhotic changes, cholecystectomy, and CBD 9mm   - CT AP with cirrhosis   - LFTs rising with bromocriptine   - Holding further bromocriptine   - Trend LFTs and bili     # GIB  - Anemia with GIB noted in ICU   - s/p EGD 4/11 with cauterization of gastric ulcer & hemostatic spraying of three duodenal ulcers. Gastric ulcer likely due to NGT trauma.   - Continue on PPI BID   - Monitor stools and HH     # Oropharyngeal dysphagia   - GI unable to place PEG due to hepatomegaly.   - IR unable to place PEG second to no safe window   - s/p open G TUBE placement on 4/18   - Continue on TF    RENAL  # Acute renal failure and HAGMA second to shock state   - Scr on admission was 1.25 (3/15/25) and increased to 6s while in ICU and s/p HD 3/24-3/29 and then again on 4/4.   - L SHILEY removed on 4/15   - Continue on HCO3 1300 TID tabs with improved acidosis, however hypernatremic and stopped.   - Monitor renal function, acidosis, and UOP     # L renal mass bx c/b hematoma   - Renal bx as below on 3/19  - Hemorrhagic shock and PEA arrest on 3/20   - Renal US with large L renal subcapsular hematoma.    - IR left renal angiogram and embolization on 3/20   - CT 4/6 with unchanged large left subcapsular and perinephric hematoma.  - CT A/P (4/20) showing stable subcapsular renal hematoma and NO new active bleeds.  - Monitor for now    # Urinary retention   - Passed TOV on 4/23   - Urinary retention again and likely neurogenic bladder   - Dave in place and hold further TOVs    # Hypernatremia  - s/p D5W   - Continue on  Q6H however sodium worsening   - Continue on FWF with no improvement.   - Hold further HCO3 tabs and rehydrate again with D5 and  Q6H   - sodium improving with D5 fluids and FWF continue to monitor     # Hypercalcemia likely from dehydration   - Rehydrate as above and monitor Ca     # Hyperphosphatemia   - Phos elevation likely second to renal failure   - PTH elevated, however Phos slowly down trending   - Monitor phos    INFECTIOUS DISEASE   # Central fevers  - Recurrent fevers and complete LVQ empirically as below   - Started on bromocriptine with improvement in fever curve, however noted with transaminitis and bromocriptine stopped with return of elevated temperature intermittently.   - Patient with known R IJ DVT and L kidney hematoma and now new LLE DVT that can cause intermittent fevers.   - Hold work up for now   - Hold further ABX for now   - Monitor fever curve.     # Questionable UTI  - Recurrent fevers with POSITIVE UA and s/p LVQ (4/23 - 4/28)   - Monitor off ABX     # VAP   - Post arrest noted with concern for aspiration and VAP in ICU   - Flu, SCx, BAL, BCx and UCx negative   - MRSA PCR with MSSA and completed bactroban in ICU   - Completed aztreonam (3/22-3/27) then casey (3/27-3/31) and vanco by dose in ICU   - CXR in ICU with RUL consolidation and completed recurrent casey course in ICU (4/2-4/7).     HEMATOLOGIC  # Anemia with SCC vs L kidney bleed vs GIB   - Baseline hemoglobin outpatient ~6.0-7.0 and sent in for anemia down to 5.3 without signs of bleeding and more likely SCC.    - Transfusion given on admission and improved back to baseline.   - Course complicated by hemorrhagic shock from left renal hematoma and HH down to 3.4.  - s/p 10U PRBC total while in ICU   - s/p PRBC pre-GTUBE with HH increased to 8.0  - c/b rapid decline in HH post GTUBE likely normalizing to baseline 6-7 as RPT CT AP post G TUBE with new bleeds and no acute signs of sickling.   - s/p PRBC 4/22   - Monitor HH     # SCD   - Discontinued deferasirox due to current renal failure   - Monitor Sickle labs QD (CBC with Diff, Retic, BMP, Hepatic Function, LDH and Hapto, and VBG).     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   - Eliquis held outpatient in anticipation for bx   - Eliquis switched to LVX and then held for bx in house   - Course complicated by multiple bleeds in ICU and challenged on heparin GTT  - VA DOPPLER 5/1 with acute LLE Gastrocnemius DVT   - Overall transfusion dependent and no further FULL AC    - Discussed with IR and recommend holding IVC filter for now  - Continue on DVT PPX HSQ and RPT DOPPLER on 5/5  - LUE and LLE precautions     # LUE arm infiltration   - HCO3 GTT infiltration in ICU   - Seen by hand sx and no compartment syndrome concern   - Monitor for now     # RUE blood infiltration   - RUE blood transfusion infiltration noted on 4/17 with area of ecchymosis   - RPT DOPPLER with known R IJ DVT, however no upper arm thrombus or issues in area of infiltration  - Monitor site for now    ENDOCRINE   # Glycemic control   - Monitor BG   - No hx of DM2     SKIN   - Wound care reccs appreciated    ETHICS   - FULL CODE     DISPO - vent facility when able 45 YO M with PMHx of sickle cell disease with prior acute chest syndrome requiring transfusion and exchange in the past (last 12/2024), iron overload, gout, HTN, and RUE DVT in 12/2024 on eliquis who presented on 3/15 by his hematologist second to anemia and hypoxia, down to 5.3 from baseline 7-8, with concern for vaso-occlusive crisis. While on medicine, anemia improved post transfusion and continued on SCC pain control PCA. Patient noted with left renal mass during prior admission and s/p L ureteroscopy with biopsy and stent placement on 3/19. Course complicated with hypoglycemia on 3/20 with RRT x 2 and found with severe anemia to 3.4 with hemorrhagic shock second to left perirenal and subcapsular hemorrhage and ultimately PEA arrest. ROSC achieved and transferred to MICU. s/p IR emobilization and course complicated anoxic brain injury, myoclonic jerks, GIB, MATA, HAGMA, DVTs (R IJ and LUE brachial), dysphagia, cirrhosis, and prolonged vent time s/p trach on 4/4. Transferred to RCU on 4/18.     NEUROLOGY  # Anoxic brain injury   - s/p cardiac arrest on 3/20 with ROSC in 4 minutes   - CT HEAD post arrest with diffuse sulcal effacement with increased intracranial pressure, and few patchy foci of cortical blurring concern for HIE  - MRI brain post arrest with with diffuse global abnormal supratentorial cortical restricted diffusion consistent with global hypoxic injury   - Monitor for now    # Seizures vs myoclonic jerks   - Witness myoclonic jerking concerning for seizures with anoxic brain injury  - vEEG with abundant myoclonic seizures in the setting of a severe diffuse/multifocal cerebral dysfunction which can be seen in the setting of sedative effect or due to anoxia  - s/p valium 10 Q4H   - Continue on keppra   - Continue on klonopin 2mg TID   - Continue on dilaudid PRN     # Central fevers   - Persistent fever spikes noted with concern for central fevers   - Bromocriptine started on 4/23, however noted with rise in LFTs and now stopped on 4/27   - Monitor fever curve.     HEENT  # Tongue biting   - Noted with tongue trauma  - s/p bite block replacement by OMFS on 4/23  - dental mouthguard mold creation on 5/2 -  Order scheduled to arrive Friday, May 16th. if discharged will need to follow up outpatient per dental.  - dental will continue with mouth care and reposition bite block     # Thrush   - On nystatin (4/28 - 5/4)   - Continue with mouth care     CARDIOLOGY  # Shock state likely hemorrhagic vs vasoplegia  - Acute renal bleed noted requiring multiple transfusions and pressors in ICU.   - TTE 3/22 with EF 63 with normal LVRVSF with PASP 49  - Weaned off pressors in ICU and complicated by hypertension.   - Continue on norvasc 5 QD   - Monitor BP     # Autonomic vs pain  - Mild HTN and tachypnea noted likely pain induced vs autonomia?   - Continue on dilaudid pushes PRN    PULMONARY  # Respiratory failure   - Presented with SCC and hypoxia likely from anemia with no signs of acute chest, however noted with PEA with hemorrhagic shock post renal bx requiring intubation.   - Prolonged intubation and s/p 7 PORTEX trach on 4/4   - Continue on vent 20/460/6/30 with nebs and chest PT  - Continue on dilaudid PRN for dyssynchrony     GI  # Shock liver vs cirrhosis with sickle cell   - Transaminitis noted in ICU and thought to be second to shock liver likely second to hemorrhagic shock vs cardiac arrest, however LFTs remains elevated with acute on chronic hyperbilirubinemia.   - US ABD suggestive of early cirrhotic changes, cholecystectomy, and CBD 9mm   - CT AP with cirrhosis   - LFTs rising with bromocriptine   - Holding further bromocriptine   - Trend LFTs and bili     # GIB  - Anemia with GIB noted in ICU   - s/p EGD 4/11 with cauterization of gastric ulcer & hemostatic spraying of three duodenal ulcers. Gastric ulcer likely due to NGT trauma.   - Continue on PPI BID   - Monitor stools and HH     # Oropharyngeal dysphagia   - GI unable to place PEG due to hepatomegaly.   - IR unable to place PEG second to no safe window   - s/p open G TUBE placement on 4/18   - Continue on TF    RENAL  # Acute renal failure and HAGMA second to shock state   - Scr on admission was 1.25 (3/15/25) and increased to 6s while in ICU and s/p HD 3/24-3/29 and then again on 4/4.   - L SHILEY removed on 4/15   - Continue on HCO3 1300 TID tabs with improved acidosis, however hypernatremic and stopped.   - Monitor renal function, acidosis, and UOP     # L renal mass bx c/b hematoma   - Renal bx as below on 3/19  - Hemorrhagic shock and PEA arrest on 3/20   - Renal US with large L renal subcapsular hematoma.    - IR left renal angiogram and embolization on 3/20   - CT 4/6 with unchanged large left subcapsular and perinephric hematoma.  - CT A/P (4/20) showing stable subcapsular renal hematoma and NO new active bleeds.  - Monitor for now    # Urinary retention   - Passed TOV on 4/23   - Urinary retention again and likely neurogenic bladder   - Dave in place and hold further TOVs    # Hypernatremia  - s/p D5W   - Continue on  Q6H however sodium worsening   - Continue on FWF with no improvement.   - Hold further HCO3 tabs and rehydrate again with D5 and  Q6H   - Sodium improving with D5 fluids and FWF continue to monitor     # Hypercalcemia likely from dehydration   - Rehydrate as above and monitor Ca     # Hyperphosphatemia   - Phos elevation likely second to renal failure   - PTH elevated, however Phos slowly down trending   - Monitor phos    INFECTIOUS DISEASE   # Central fevers  - Recurrent fevers and complete LVQ empirically as below   - Started on bromocriptine with improvement in fever curve, however noted with transaminitis and bromocriptine stopped with return of elevated temperature intermittently.   - Patient with known R IJ DVT and L kidney hematoma and now new LLE DVT that can cause intermittent fevers.   - Hold work up for now   - Hold further ABX for now   - Monitor fever curve.     # Questionable UTI  - Recurrent fevers with POSITIVE UA and s/p LVQ (4/23 - 4/28)   - Monitor off ABX     # VAP   - Post arrest noted with concern for aspiration and VAP in ICU   - Flu, SCx, BAL, BCx and UCx negative   - MRSA PCR with MSSA and completed bactroban in ICU   - Completed aztreonam (3/22-3/27) then casey (3/27-3/31) and vanco by dose in ICU   - CXR in ICU with RUL consolidation and completed recurrent casey course in ICU (4/2-4/7).     HEMATOLOGIC  # Anemia with SCC vs L kidney bleed vs GIB   - Baseline hemoglobin outpatient ~6.0-7.0 and sent in for anemia down to 5.3 without signs of bleeding and more likely SCC.    - Transfusion given on admission and improved back to baseline.   - Course complicated by hemorrhagic shock from left renal hematoma and HH down to 3.4.  - s/p 10U PRBC total while in ICU   - s/p PRBC pre-GTUBE with HH increased to 8.0  - c/b rapid decline in HH post GTUBE likely normalizing to baseline 6-7 as RPT CT AP post G TUBE with new bleeds and no acute signs of sickling.   - s/p PRBC 4/22   - Monitor HH     # SCD   - Discontinued deferasirox due to current renal failure   - Monitor Sickle labs QD (CBC with Diff, Retic, BMP, Hepatic Function, LDH and Hapto, and VBG).     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   - Eliquis held outpatient in anticipation for bx   - Eliquis switched to LVX and then held for bx in house   - Course complicated by multiple bleeds in ICU and challenged on heparin GTT  - VA DOPPLER 5/1 with acute LLE Gastrocnemius DVT   - Overall transfusion dependent and no further FULL AC    - Discussed with IR and recommend holding IVC filter for now  - Continue on DVT PPX HSQ and RPT DOPPLER on 5/5  - LUE and LLE precautions     # LUE arm infiltration   - HCO3 GTT infiltration in ICU   - Seen by hand sx and no compartment syndrome concern   - Monitor for now     # RUE blood infiltration   - RUE blood transfusion infiltration noted on 4/17 with area of ecchymosis   - RPT DOPPLER with known R IJ DVT, however no upper arm thrombus or issues in area of infiltration  - Monitor site for now    ENDOCRINE   # Glycemic control   - Monitor BG   - No hx of DM2     SKIN   - Wound care reccs appreciated    ETHICS   - FULL CODE     DISPO - vent facility when able

## 2025-05-06 LAB
GLUCOSE BLDC GLUCOMTR-MCNC: 116 MG/DL — HIGH (ref 70–99)
GLUCOSE BLDC GLUCOMTR-MCNC: 140 MG/DL — HIGH (ref 70–99)

## 2025-05-06 PROCEDURE — 99233 SBSQ HOSP IP/OBS HIGH 50: CPT

## 2025-05-06 RX ADMIN — IPRATROPIUM BROMIDE AND ALBUTEROL SULFATE 3 MILLILITER(S): .5; 2.5 SOLUTION RESPIRATORY (INHALATION) at 21:52

## 2025-05-06 RX ADMIN — HEPARIN SODIUM 5000 UNIT(S): 1000 INJECTION INTRAVENOUS; SUBCUTANEOUS at 16:53

## 2025-05-06 RX ADMIN — Medication 40 MILLIGRAM(S): at 16:53

## 2025-05-06 RX ADMIN — HEPARIN SODIUM 5000 UNIT(S): 1000 INJECTION INTRAVENOUS; SUBCUTANEOUS at 05:01

## 2025-05-06 RX ADMIN — CLONAZEPAM 2 MILLIGRAM(S): 0.5 TABLET ORAL at 21:44

## 2025-05-06 RX ADMIN — Medication 15 MILLILITER(S): at 16:53

## 2025-05-06 RX ADMIN — CLONAZEPAM 2 MILLIGRAM(S): 0.5 TABLET ORAL at 11:25

## 2025-05-06 RX ADMIN — IPRATROPIUM BROMIDE AND ALBUTEROL SULFATE 3 MILLILITER(S): .5; 2.5 SOLUTION RESPIRATORY (INHALATION) at 03:32

## 2025-05-06 RX ADMIN — Medication 0.5 MILLIGRAM(S): at 05:13

## 2025-05-06 RX ADMIN — Medication 1 APPLICATION(S): at 11:25

## 2025-05-06 RX ADMIN — AMLODIPINE BESYLATE 5 MILLIGRAM(S): 10 TABLET ORAL at 05:00

## 2025-05-06 RX ADMIN — IPRATROPIUM BROMIDE AND ALBUTEROL SULFATE 3 MILLILITER(S): .5; 2.5 SOLUTION RESPIRATORY (INHALATION) at 15:28

## 2025-05-06 RX ADMIN — Medication 15 MILLILITER(S): at 05:01

## 2025-05-06 RX ADMIN — CLONAZEPAM 2 MILLIGRAM(S): 0.5 TABLET ORAL at 05:00

## 2025-05-06 RX ADMIN — LEVETIRACETAM 500 MILLIGRAM(S): 10 INJECTION, SOLUTION INTRAVENOUS at 05:01

## 2025-05-06 RX ADMIN — Medication 40 MILLIGRAM(S): at 05:00

## 2025-05-06 RX ADMIN — LEVETIRACETAM 500 MILLIGRAM(S): 10 INJECTION, SOLUTION INTRAVENOUS at 16:53

## 2025-05-06 RX ADMIN — IPRATROPIUM BROMIDE AND ALBUTEROL SULFATE 3 MILLILITER(S): .5; 2.5 SOLUTION RESPIRATORY (INHALATION) at 08:36

## 2025-05-06 NOTE — CHART NOTE - NSCHARTNOTEFT_GEN_A_CORE
NUTRITION FOLLOW UP NOTE     REASON FOR ASSESSMENT: SEVERE MALNUTRITION FOLLOW UP     SOURCE:    [x] Medical record [x] RN/PCA  [x]Patient inappropriate (disoriented, nonverbal)    MEDICAL COURSE:  Per chart,"43 YO M with PMHx of sickle cell disease with prior acute chest syndrome requiring transfusion and exchange in the past (last 12/2024), iron overload, gout, HTN, and RUE DVT in 12/2024 on eliquis who presented on 3/15 by his hematologist second to anemia and hypoxia, down to 5.3 from baseline 7-8, with concern for vaso-occlusive crisis. While on medicine, anemia improved post transfusion and continued on SCC pain control PCA. Patient noted with left renal mass during prior admission and s/p L ureteroscopy with biopsy and stent placement on 3/19. Course complicated with hypoglycemia on 3/20 with RRT x 2 and found with severe anemia to 3.4 with hemorrhagic shock second to left perirenal and subcapsular hemorrhage and ultimately PEA arrest. ROSC achieved and transferred to MICU. s/p IR emobilization and course complicated anoxic brain injury, myoclonic jerks, GIB, MATA, HAGMA, DVTs (R IJ and LUE brachial), dysphagia, cirrhosis, and prolonged vent time s/p trach on 4/4. Transferred to RCU on 4/18."      DIET PRESCRIPTION:  Diet, NPO with Tube Feed:   Tube Feeding Modality: Gastrostomy  Nepro with Carb Steady (NEPRORTH)  Total Volume for 24 Hours (mL): 1170  Continuous  Starting Tube Feed Rate {mL per Hour}: 65  Until Goal Tube Feed Rate (mL per Hour): 65  Tube Feed Duration (in Hours): 18  Tube Feed Start Time: 11:00  Tube Feed Stop Time: 05:00 (04-22-25 @ 09:55)      NUTRITION COURSE:  Unable to conduct nutrition interview 2/2 decreased cognition. Information obtained from comprehensive chart review and per RN today: No reports of any recent episodes of nausea, vomiting, diarrhea or constipation, Last BM noted on 5/7 per RN flowsheets.     Pt remains NPO TF only as sole source of nutrition and hydration;  Nepro @ 65ml/hr x18hrs; TF provides (77kg IBW); 1170ml total volume, 2106 kcal (27 kcal/kg), 95gm protein (1.2gm/kg), 854ml free water from formula- no TF intolerances noted.         PERTINENT MEDICATIONS:  MEDICATIONS  (STANDING):  albuterol/ipratropium for Nebulization 3 milliLiter(s) Nebulizer every 6 hours  amLODIPine   Tablet 5 milliGRAM(s) Oral daily  Biotene Dry Mouth Oral Rinse 15 milliLiter(s) Swish and Spit every 8 hours  chlorhexidine 2% Cloths 1 Application(s) Topical daily  clonazePAM  Tablet 2 milliGRAM(s) Oral every 8 hours  heparin   Injectable 5000 Unit(s) SubCutaneous every 12 hours  influenza   Vaccine 0.5 milliLiter(s) IntraMuscular once  levETIRAcetam  Solution 500 milliGRAM(s) Oral two times a day  pantoprazole   Suspension 40 milliGRAM(s) Oral every 12 hours  sodium chloride 0.9%. 1000 milliLiter(s) (100 mL/Hr) IV Continuous <Continuous>    MEDICATIONS  (PRN):  acetaminophen   Oral Liquid .. 650 milliGRAM(s) Oral every 6 hours PRN Temp greater or equal to 38C (100.4F), Mild Pain (1 - 3)  HYDROmorphone  Injectable 0.5 milliGRAM(s) IV Push every 4 hours PRN dysynchronny    [x] Relevant notes on medications: none    PERTINENT LABS:  05-07 Na145 mmol/L Glu 118 mg/dL[H] K+ 4.5 mmol/L Cr  1.23 mg/dL BUN 71 mg/dL[H] 05-07 Phos 3.8 mg/dL 05-07 Alb 3.1 g/dL[L]     A1C with Estimated Average Glucose Result: 5.5 % (04-22-25 @ 05:55)    POCT Blood Glucose.: 118 mg/dL (07 May 2025 10:45)  POCT Blood Glucose.: 140 mg/dL (06 May 2025 23:57)    [x] Relevant notes on labs:  Continues with elevated BUN/Cr, continue on therapeutic formula.     ANTHROPOMETRICS:  Height (cm): 175 (04-18 @ 08:20)  Weight (kg): 88.8 (04-18 @ 08:20)  BMI (kg/m2): 29 (04-18 @ 08:20)  Weight Assessment: No new weight to assess.     PHYSICAL ASSESSMENT, per flowsheets:  Edema: generalized 1+  Pressure Injury: none      ESTIMATED NEEDS:  [X] No change since previous assessment, Based on Ideal Body Weight +10% 169.4lbs/ 77kg  1324-6775 kcal/d, 25-30 kcal/kg, 92-115gm protein/d, 1.2-1.5gm/kg    PREVIOUS NUTRITION DX: [ x] Malnutrition   Nutrition Diagnosis is [x] ongoing  [ ] resolved [ ] not applicable   New Nutrition Diagnosis: [x] not applicable     EDUCATION:  [ x] N/A 2/2 decreased cognition      RECOMMENDATIONS/INTERVENTIONS:  1) Recommend change TF to bolus feeding Nepro @ 195ml q4hr (6x/day) to provide same total volume (1170ml per day)   2) Continue to monitor TF tolerance  3) RD to f/u prn       MONITORING & EVALUATING:  Tolerance to diet/supplement, nutrition related lab values, weight trends, BMs/GI distress, hydration status, skin integrity.    Jeanine Massey MS, RDN | Available on [employer] TE[employer] | Office #[phone]

## 2025-05-06 NOTE — PROGRESS NOTE ADULT - SUBJECTIVE AND OBJECTIVE BOX
RCU PROGRESS NOTE     CHIEF COMPLAINT: Patient is a 44y old  Male who presents with a chief complaint of Referred by Hematologist for low Hg (05 May 2025 14:23)      INTERVAL EVENTS:    REVIEW OF SYSTEMS: Seen by bedside during AM rounds and     Mode: AC/ CMV (Assist Control/ Continuous Mandatory Ventilation), RR (machine): 20, TV (machine): 460, FiO2: 30, PEEP: 6, ITime: 0.74, MAP: 11, PIP: 21      OBJECTIVE:  ICU Vital Signs Last 24 Hrs  T(C): 37.4 (06 May 2025 05:00), Max: 37.6 (06 May 2025 00:00)  T(F): 99.4 (06 May 2025 05:00), Max: 99.6 (06 May 2025 00:00)  HR: 108 (06 May 2025 07:31) (78 - 120)  BP: 134/78 (06 May 2025 05:00) (114/72 - 134/78)  BP(mean): --  ABP: --  ABP(mean): --  RR: 20 (06 May 2025 05:00) (20 - 29)  SpO2: 100% (06 May 2025 07:31) (97% - 100%)    O2 Parameters below as of 06 May 2025 05:00  Patient On (Oxygen Delivery Method): ventilator    O2 Concentration (%): 30      Mode: AC/ CMV (Assist Control/ Continuous Mandatory Ventilation), RR (machine): 20, TV (machine): 460, FiO2: 30, PEEP: 6, ITime: 0.74, MAP: 11, PIP: 21    - @ 07:01  -  - @ 07:00  --------------------------------------------------------  IN: 2975 mL / OUT: 1800 mL / NET: 1175 mL      CAPILLARY BLOOD GLUCOSE      POCT Blood Glucose.: 137 mg/dL (05 May 2025 21:43)      HOSPITAL MEDICATIONS:  MEDICATIONS  (STANDING):  albuterol/ipratropium for Nebulization 3 milliLiter(s) Nebulizer every 6 hours  amLODIPine   Tablet 5 milliGRAM(s) Oral daily  chlorhexidine 0.12% Liquid 15 milliLiter(s) Oral Mucosa two times a day  chlorhexidine 2% Cloths 1 Application(s) Topical daily  clonazePAM  Tablet 2 milliGRAM(s) Oral every 8 hours  heparin   Injectable 5000 Unit(s) SubCutaneous every 12 hours  influenza   Vaccine 0.5 milliLiter(s) IntraMuscular once  levETIRAcetam  Solution 500 milliGRAM(s) Oral two times a day  pantoprazole   Suspension 40 milliGRAM(s) Oral every 12 hours    MEDICATIONS  (PRN):  acetaminophen   Oral Liquid .. 650 milliGRAM(s) Oral every 6 hours PRN Temp greater or equal to 38C (100.4F), Mild Pain (1 - 3)  HYDROmorphone  Injectable 0.5 milliGRAM(s) IV Push every 4 hours PRN dysynchronny      PHYSICAL EXAMINATION    LABS:                        6.8    22.30 )-----------( 301      ( 05 May 2025 05:10 )             21.0         144  |  111[H]  |  74[H]  ----------------------------<  127[H]  4.5   |  17[L]  |  1.51[H]    Ca    10.6[H]      05 May 2025 05:10  Phos  3.9     05-  Mg     2.70     -    TPro  8.1  /  Alb  3.0[L]  /  TBili  2.4[H]  /  DBili  x   /  AST  91[H]  /  ALT  93[H]  /  AlkPhos  644[H]  05-05      Urinalysis Basic - ( 05 May 2025 05:10 )    Color: x / Appearance: x / SG: x / pH: x  Gluc: 127 mg/dL / Ketone: x  / Bili: x / Urobili: x   Blood: x / Protein: x / Nitrite: x   Leuk Esterase: x / RBC: x / WBC x   Sq Epi: x / Non Sq Epi: x / Bacteria: x        Venous Blood Gas:   @ 05:10  7.34/41/52/22/84.7  VBG Lactate: 1.5      PAST MEDICAL & SURGICAL HISTORY:  Sickle cell anemia      Gout      Deep vein thrombosis (DVT)      Iron overload      Hypertension      History of cholecystectomy      History of removal of Port-a-Cath          FAMILY HISTORY:  Family history of sickle cell trait (Father, Mother)    Patient's father is  (Father)    Patient's mother is  (Mother)        Social History:  Lives alone (15 Mar 2025 09:54)      RADIOLOGY:  [ ] Reviewed and interpreted by me    PULMONARY FUNCTION TESTS:    EKG: RCU PROGRESS NOTE     CHIEF COMPLAINT: Patient is a 44y old  Male who presents with a chief complaint of Referred by Hematologist for low Hg (05 May 2025 14:23)      INTERVAL EVENTS:  - No interval events overnight   - DISPO planning     REVIEW OF SYSTEMS: Seen by bedside during AM rounds and unable to assess ROS second to vented/ HIE    Mode: AC/ CMV (Assist Control/ Continuous Mandatory Ventilation), RR (machine): 20, TV (machine): 460, FiO2: 30, PEEP: 6, ITime: 0.74, MAP: 11, PIP: 21      OBJECTIVE:  ICU Vital Signs Last 24 Hrs  T(C): 37.4 (06 May 2025 05:00), Max: 37.6 (06 May 2025 00:00)  T(F): 99.4 (06 May 2025 05:00), Max: 99.6 (06 May 2025 00:00)  HR: 108 (06 May 2025 07:31) (78 - 120)  BP: 134/78 (06 May 2025 05:00) (114/72 - 134/78)  BP(mean): --  ABP: --  ABP(mean): --  RR: 20 (06 May 2025 05:00) (20 - 29)  SpO2: 100% (06 May 2025 07:31) (97% - 100%)    O2 Parameters below as of 06 May 2025 05:00  Patient On (Oxygen Delivery Method): ventilator    O2 Concentration (%): 30      Mode: AC/ CMV (Assist Control/ Continuous Mandatory Ventilation), RR (machine): 20, TV (machine): 460, FiO2: 30, PEEP: 6, ITime: 0.74, MAP: 11, PIP: 21    - @ 07:01  -  - @ 07:00  --------------------------------------------------------  IN: 2975 mL / OUT: 1800 mL / NET: 1175 mL      CAPILLARY BLOOD GLUCOSE  POCT Blood Glucose.: 137 mg/dL (05 May 2025 21:43)      HOSPITAL MEDICATIONS:  MEDICATIONS  (STANDING):  albuterol/ipratropium for Nebulization 3 milliLiter(s) Nebulizer every 6 hours  amLODIPine   Tablet 5 milliGRAM(s) Oral daily  chlorhexidine 0.12% Liquid 15 milliLiter(s) Oral Mucosa two times a day  chlorhexidine 2% Cloths 1 Application(s) Topical daily  clonazePAM  Tablet 2 milliGRAM(s) Oral every 8 hours  heparin   Injectable 5000 Unit(s) SubCutaneous every 12 hours  influenza   Vaccine 0.5 milliLiter(s) IntraMuscular once  levETIRAcetam  Solution 500 milliGRAM(s) Oral two times a day  pantoprazole   Suspension 40 milliGRAM(s) Oral every 12 hours    MEDICATIONS  (PRN):  acetaminophen   Oral Liquid .. 650 milliGRAM(s) Oral every 6 hours PRN Temp greater or equal to 38C (100.4F), Mild Pain (1 - 3)  HYDROmorphone  Injectable 0.5 milliGRAM(s) IV Push every 4 hours PRN dysynchronny      PHYSICAL EXAMINATION  General: NAD  HEENT: Trach present    Cards: S1/S2, no murmurs   Pulm: Course vent sounds bilaterally.   Abdomen: Soft, nondistended and nontender. PEG and daniel (+)   Extremities: No pedal edema. No active SABINO of BL upper and lower extremities.  Neurology: Awakens with eyes open, but does not follow commands with baseline HIE with no acute focal neurological deficits     LABS:                        6.8    22.30 )-----------( 301      ( 05 May 2025 05:10 )             21.0     05-    144  |  111[H]  |  74[H]  ----------------------------<  127[H]  4.5   |  17[L]  |  1.51[H]    Ca    10.6[H]      05 May 2025 05:10  Phos  3.9     05-05  Mg     2.70     05-    TPro  8.1  /  Alb  3.0[L]  /  TBili  2.4[H]  /  DBili  x   /  AST  91[H]  /  ALT  93[H]  /  AlkPhos  644[H]  05-      Urinalysis Basic - ( 05 May 2025 05:10 )  Color: x / Appearance: x / SG: x / pH: x  Gluc: 127 mg/dL / Ketone: x  / Bili: x / Urobili: x   Blood: x / Protein: x / Nitrite: x   Leuk Esterase: x / RBC: x / WBC x   Sq Epi: x / Non Sq Epi: x / Bacteria: x        Venous Blood Gas:  -05 @ 05:10  7.34/41/52/22/84.7  VBG Lactate: 1.5      PAST MEDICAL & SURGICAL HISTORY:  Sickle cell anemia      Gout      Deep vein thrombosis (DVT)      Iron overload      Hypertension      History of cholecystectomy      History of removal of Port-a-Cath          FAMILY HISTORY:  Family history of sickle cell trait (Father, Mother)    Patient's father is  (Father)    Patient's mother is  (Mother)        Social History:  Lives alone (15 Mar 2025 09:54)      RADIOLOGY:  [ ] Reviewed and interpreted by me    PULMONARY FUNCTION TESTS:    EKG:

## 2025-05-06 NOTE — PROGRESS NOTE ADULT - ASSESSMENT
45 YO M with PMHx of sickle cell disease with prior acute chest syndrome requiring transfusion and exchange in the past (last 12/2024), iron overload, gout, HTN, and RUE DVT in 12/2024 on eliquis who presented on 3/15 by his hematologist second to anemia and hypoxia, down to 5.3 from baseline 7-8, with concern for vaso-occlusive crisis. While on medicine, anemia improved post transfusion and continued on SCC pain control PCA. Patient noted with left renal mass during prior admission and s/p L ureteroscopy with biopsy and stent placement on 3/19. Course complicated with hypoglycemia on 3/20 with RRT x 2 and found with severe anemia to 3.4 with hemorrhagic shock second to left perirenal and subcapsular hemorrhage and ultimately PEA arrest. ROSC achieved and transferred to MICU. s/p IR emobilization and course complicated anoxic brain injury, myoclonic jerks, GIB, MATA, HAGMA, DVTs (R IJ and LUE brachial), dysphagia, cirrhosis, and prolonged vent time s/p trach on 4/4. Transferred to RCU on 4/18.     NEUROLOGY  # Anoxic brain injury   - s/p cardiac arrest on 3/20 with ROSC in 4 minutes   - CT HEAD post arrest with diffuse sulcal effacement with increased intracranial pressure, and few patchy foci of cortical blurring concern for HIE  - MRI brain post arrest with with diffuse global abnormal supratentorial cortical restricted diffusion consistent with global hypoxic injury   - Monitor for now    # Seizures vs myoclonic jerks   - Witness myoclonic jerking concerning for seizures with anoxic brain injury  - vEEG with abundant myoclonic seizures in the setting of a severe diffuse/multifocal cerebral dysfunction which can be seen in the setting of sedative effect or due to anoxia  - s/p valium 10 Q4H   - Continue on keppra   - Continue on klonopin 2mg TID   - Continue on dilaudid PRN     # Central fevers   - Persistent fever spikes noted with concern for central fevers   - Bromocriptine started on 4/23, however noted with rise in LFTs and now stopped on 4/27   - Monitor fever curve.     HEENT  # Tongue biting   - Noted with tongue trauma  - s/p bite block replacement by OMFS on 4/23  - s/p dental mouth guard scan on 5/2   - Mouth guard to be delivered on 5/16   - Dental will continue with mouth care and reposition bite block PRN    # Thrush   - On nystatin (4/28 - 5/4)   - Continue with mouth care     CARDIOLOGY  # Shock state likely hemorrhagic vs vasoplegia  - Acute renal bleed noted requiring multiple transfusions and pressors in ICU.   - TTE 3/22 with EF 63 with normal LVRVSF with PASP 49  - Weaned off pressors in ICU and complicated by hypertension.   - Continue on norvasc 5 QD   - Monitor BP     # Autonomic vs pain  - Mild HTN and tachypnea noted likely pain induced vs autonomia?   - Continue on dilaudid pushes PRN    PULMONARY  # Respiratory failure   - Presented with SCC and hypoxia likely from anemia with no signs of acute chest, however noted with PEA with hemorrhagic shock post renal bx requiring intubation.   - Prolonged intubation and s/p 7 PORTEX trach on 4/4   - Continue on vent 20/460/6/30 with nebs and chest PT  - Continue on dilaudid PRN for dyssynchrony     GI  # Shock liver vs cirrhosis with sickle cell   - Transaminitis noted in ICU and thought to be second to shock liver likely second to hemorrhagic shock vs cardiac arrest, however LFTs remains elevated with acute on chronic hyperbilirubinemia.   - US ABD suggestive of early cirrhotic changes, cholecystectomy, and CBD 9mm   - CT AP with cirrhosis   - LFTs rising with bromocriptine   - Holding further bromocriptine   - Trend LFTs and bili     # GIB  - Anemia with GIB noted in ICU   - s/p EGD 4/11 with cauterization of gastric ulcer & hemostatic spraying of three duodenal ulcers. Gastric ulcer likely due to NGT trauma.   - Continue on PPI BID   - Monitor stools and HH     # Oropharyngeal dysphagia   - GI unable to place PEG due to hepatomegaly.   - IR unable to place PEG second to no safe window   - s/p open G TUBE placement on 4/18   - Continue on TF    RENAL  # Acute renal failure and HAGMA second to shock state   - Scr on admission was 1.25 (3/15/25) and increased to 6s while in ICU and s/p HD 3/24-3/29 and then again on 4/4.   - L SHILEY removed on 4/15   - Continue on HCO3 1300 TID tabs with improved acidosis, however hypernatremic and stopped.   - Monitor renal function, acidosis, and UOP     # L renal mass bx c/b hematoma   - Renal bx as below on 3/19  - Hemorrhagic shock and PEA arrest on 3/20   - Renal US with large L renal subcapsular hematoma.    - IR left renal angiogram and embolization on 3/20   - CT 4/6 with unchanged large left subcapsular and perinephric hematoma.  - CT A/P (4/20) showing stable subcapsular renal hematoma and NO new active bleeds.  - Monitor for now    # Urinary retention   - Passed TOV on 4/23   - Urinary retention again and likely neurogenic bladder   - Dave in place and hold further TOVs    # Hypernatremia  - s/p D5W   - Continue on  Q6H however sodium worsening   - Continue on FWF with no improvement.   - Continued on D5 with improvement   - Continue on  Q6H and monitor sodium     # Hypercalcemia likely from dehydration   - Rehydrate as above and monitor Ca     # Hyperphosphatemia   - Phos elevation likely second to renal failure   - PTH elevated, however Phos slowly down trending   - Monitor phos    INFECTIOUS DISEASE   # Central fevers  - Recurrent fevers and complete LVQ empirically as below   - Started on bromocriptine with improvement in fever curve, however noted with transaminitis and bromocriptine stopped with return of elevated temperature intermittently.   - Patient with known R IJ DVT and L kidney hematoma and now new LLE DVT that can cause intermittent fevers.   - Hold work up for now   - Hold further ABX for now   - Monitor fever curve.     # Questionable UTI  - Recurrent fevers with POSITIVE UA and s/p LVQ (4/23 - 4/28)   - Monitor off ABX     # VAP   - Post arrest noted with concern for aspiration and VAP in ICU   - Flu, SCx, BAL, BCx and UCx negative   - MRSA PCR with MSSA and completed bactroban in ICU   - Completed aztreonam (3/22-3/27) then casey (3/27-3/31) and vanco by dose in ICU   - CXR in ICU with RUL consolidation and completed recurrent casey course in ICU (4/2-4/7).     HEMATOLOGIC  # Anemia with SCC vs L kidney bleed vs GIB   - Baseline hemoglobin outpatient ~6.0-7.0 and sent in for anemia down to 5.3 without signs of bleeding and more likely SCC.    - Transfusion given on admission and improved back to baseline.   - Course complicated by hemorrhagic shock from left renal hematoma and HH down to 3.4.  - s/p 10U PRBC total while in ICU   - s/p PRBC pre-GTUBE with HH increased to 8.0  - c/b rapid decline in HH post GTUBE likely normalizing to baseline 6-7 as RPT CT AP post G TUBE with new bleeds and no acute signs of sickling.   - s/p PRBC 4/22   - Monitor HH     # SCD   - Discontinued deferasirox due to current renal failure   - Monitor Sickle labs QD (CBC with Diff, Retic, BMP, Hepatic Function, LDH and Hapto, and VBG).     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 Discussed with IR recommend holding IVC filter for now and pending RPT DOPPLER.   - Continue on DVT PPX HSQ   - LUE and LLE precautions     # LUE arm infiltration   - HCO3 GTT infiltration in ICU   - Seen by hand sx and no compartment syndrome concern   - Monitor for now     # RUE blood infiltration   - RUE blood transfusion infiltration noted on 4/17 with area of ecchymosis   - RPT DOPPLER with known R IJ DVT, however no upper arm thrombus or issues in area of infiltration  - Monitor site for now    ENDOCRINE   # Glycemic control   - Monitor BG   - No hx of DM2     SKIN   - Wound care reccs appreciated    ETHICS   - FULL CODE     DISPO - vent facility when able

## 2025-05-06 NOTE — PROGRESS NOTE ADULT - NS ATTEND AMEND GEN_ALL_CORE FT
Pt is a 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 transfusion dependence c/b iron overload with cirrhosis, HTN, and RUE DVT (12/2024) on NOAC presenting to [facility] on 3/15/25 from hematology office for vaso-occlusive sickle cell crisis with acute chest syndrome and hypoxemic respiratory failure with hospital course c/b left renal mass s/p left ureteroscopy with biopsy/stent placement (3/19) c/b acute on chronic anemia 2/2 hemorrhagic shock from left perirenal-subcapsular hemorrhage c/b PEA arrest with ROSC (3/20, unclear downtime) s/p emergent IR embolization but c/b severe post-cardiac arrest hypoxic-ischemic encephalopathy with failure to wean from ventilator s/p tracheostomy placement (4/4/25) and RCU transfer 4/18 for further medical management.     Pt with concern for hypoxic-ischemic encephalopathy post-cardiac arrest. Pt with some brainstem reflexes but otherwise remains unresponsive with no purposeful consciousness and functional quadriplegia with full dependence on ADLs. Pt with previous episodes of myoclonus. CT and MRI imaging with diffuse cortical injury. EEG with GPD vs. myoclonus, but also with severe diffuse/multifocal cerebral dysfunction. Neurology consulted, recs appreciated. Concern for poor neurologic prognosis with likely persistent vegetative state vs. minimal consciousness. Discussed with pt's family along with palliative and neurology team. Pt continues on Keppra + clonazepam + prn Dilaudid, myoclonus improving. Central fevers resolved, will monitor off bromocriptine 2/2 concern for transaminitis.     Pt with acute combined hypoxemic and hypercapnic respiratory failure with failure to wean from ventilator s/p tracheostomy placement (#7 Portex 4/4).  Will continue to wean vent as tolerated. Airway clearance therapy in place. Wean O2 supplementation for goal O2 saturation 90-95%. Aspiration precautions and oral hygiene in place. Trach care and suctioning as per RCU routine. Hospital course c/b tongue biting with evidence of tongue trauma now s/p bite block placement by OMFS followed by dental mouthguard mold creation (5/2) to arrive by 5/16.    Pt with sickle cell dz with hx vaso-occlusive crises and intermittent transfusion dependence previously on deferasirox and Voxelotor, now off all maintenance therapy. Hemolysis labs grossly stable. Free water in place. Superimposed hemorrhagic shock now resolved, last pRBC ~4/22. Discussed with hematology, would not restart medications at this time. Hospital course further c/b RIJ, left brachial, and LLE gastrocnemius DVT i/s/o chronic VTEs previously on NOAC. Pt has had multiple life-threatening bleeding while in hospital on A/C, will hold for now. Tolerating DVT ppx. Repeat serial duplex 5/5 to check for propagation. Discussed with IR team, not a candidate for IVCF at this time.     Pt with oropharyngeal dysphagia s/p PEG placement now tolerating feeds at goal rate. Bowel regimen in place prn. PPI ppx. No renal or endocrine issues.     Pt further with newly diagnosed left renal mass non-invasive urothelial carcinoma. As per hematology, given overall co-morbidities and multiorgan failure, pt is currently not a candidate for DMT.    Pt medically optimized for hospital discharge to LTCF with ventilator capabilities. Pt full code. DVT ppx in place. Palliative consulted, recs appreciated. Will continue to update family and discuss plan of care throughout hospitalization.

## 2025-05-07 ENCOUNTER — RESULT REVIEW (OUTPATIENT)
Age: 45
End: 2025-05-07

## 2025-05-07 LAB
ALBUMIN SERPL ELPH-MCNC: 3.1 G/DL — LOW (ref 3.3–5)
ALP SERPL-CCNC: 629 U/L — HIGH (ref 40–120)
ALT FLD-CCNC: 78 U/L — HIGH (ref 4–41)
ANION GAP SERPL CALC-SCNC: 12 MMOL/L — SIGNIFICANT CHANGE UP (ref 7–14)
AST SERPL-CCNC: 84 U/L — HIGH (ref 4–40)
BASOPHILS # BLD AUTO: 0.08 K/UL — SIGNIFICANT CHANGE UP (ref 0–0.2)
BASOPHILS NFR BLD AUTO: 0.4 % — SIGNIFICANT CHANGE UP (ref 0–2)
BILIRUB SERPL-MCNC: 2.3 MG/DL — HIGH (ref 0.2–1.2)
BUN SERPL-MCNC: 71 MG/DL — HIGH (ref 7–23)
CALCIUM SERPL-MCNC: 11.3 MG/DL — HIGH (ref 8.4–10.5)
CHLORIDE SERPL-SCNC: 114 MMOL/L — HIGH (ref 98–107)
CO2 SERPL-SCNC: 19 MMOL/L — LOW (ref 22–31)
CREAT SERPL-MCNC: 1.23 MG/DL — SIGNIFICANT CHANGE UP (ref 0.5–1.3)
EGFR: 74 ML/MIN/1.73M2 — SIGNIFICANT CHANGE UP
EGFR: 74 ML/MIN/1.73M2 — SIGNIFICANT CHANGE UP
EOSINOPHIL # BLD AUTO: 0.51 K/UL — HIGH (ref 0–0.5)
EOSINOPHIL NFR BLD AUTO: 2.7 % — SIGNIFICANT CHANGE UP (ref 0–6)
GLUCOSE BLDC GLUCOMTR-MCNC: 118 MG/DL — HIGH (ref 70–99)
GLUCOSE SERPL-MCNC: 118 MG/DL — HIGH (ref 70–99)
HAPTOGLOB SERPL-MCNC: 47 MG/DL — SIGNIFICANT CHANGE UP (ref 34–200)
HCT VFR BLD CALC: 19.3 % — CRITICAL LOW (ref 39–50)
HGB BLD-MCNC: 6.3 G/DL — CRITICAL LOW (ref 13–17)
IANC: 14.48 K/UL — HIGH (ref 1.8–7.4)
IMM GRANULOCYTES NFR BLD AUTO: 0.6 % — SIGNIFICANT CHANGE UP (ref 0–0.9)
LDH SERPL L TO P-CCNC: 196 U/L — SIGNIFICANT CHANGE UP (ref 135–225)
LYMPHOCYTES # BLD AUTO: 12.9 % — LOW (ref 13–44)
LYMPHOCYTES # BLD AUTO: 2.42 K/UL — SIGNIFICANT CHANGE UP (ref 1–3.3)
MAGNESIUM SERPL-MCNC: 2.5 MG/DL — SIGNIFICANT CHANGE UP (ref 1.6–2.6)
MCHC RBC-ENTMCNC: 29.7 PG — SIGNIFICANT CHANGE UP (ref 27–34)
MCHC RBC-ENTMCNC: 32.6 G/DL — SIGNIFICANT CHANGE UP (ref 32–36)
MCV RBC AUTO: 91 FL — SIGNIFICANT CHANGE UP (ref 80–100)
MONOCYTES # BLD AUTO: 1.11 K/UL — HIGH (ref 0–0.9)
MONOCYTES NFR BLD AUTO: 5.9 % — SIGNIFICANT CHANGE UP (ref 2–14)
NEUTROPHILS # BLD AUTO: 14.48 K/UL — HIGH (ref 1.8–7.4)
NEUTROPHILS NFR BLD AUTO: 77.5 % — HIGH (ref 43–77)
NRBC # BLD AUTO: 0 K/UL — SIGNIFICANT CHANGE UP (ref 0–0)
NRBC # FLD: 0 K/UL — SIGNIFICANT CHANGE UP (ref 0–0)
NRBC BLD AUTO-RTO: 0 /100 WBCS — SIGNIFICANT CHANGE UP (ref 0–0)
PHOSPHATE SERPL-MCNC: 3.8 MG/DL — SIGNIFICANT CHANGE UP (ref 2.5–4.5)
PLATELET # BLD AUTO: 277 K/UL — SIGNIFICANT CHANGE UP (ref 150–400)
POTASSIUM SERPL-MCNC: 4.5 MMOL/L — SIGNIFICANT CHANGE UP (ref 3.5–5.3)
POTASSIUM SERPL-SCNC: 4.5 MMOL/L — SIGNIFICANT CHANGE UP (ref 3.5–5.3)
PROT SERPL-MCNC: 7.9 G/DL — SIGNIFICANT CHANGE UP (ref 6–8.3)
RBC # BLD: 2.12 M/UL — LOW (ref 4.2–5.8)
RBC # BLD: 2.12 M/UL — LOW (ref 4.2–5.8)
RBC # FLD: 18.5 % — HIGH (ref 10.3–14.5)
RETICS #: 115.5 K/UL — SIGNIFICANT CHANGE UP (ref 25–125)
RETICS/RBC NFR: 5.5 % — HIGH (ref 0.5–2.5)
SODIUM SERPL-SCNC: 145 MMOL/L — SIGNIFICANT CHANGE UP (ref 135–145)
WBC # BLD: 18.71 K/UL — HIGH (ref 3.8–10.5)
WBC # FLD AUTO: 18.71 K/UL — HIGH (ref 3.8–10.5)

## 2025-05-07 PROCEDURE — 99233 SBSQ HOSP IP/OBS HIGH 50: CPT

## 2025-05-07 PROCEDURE — 93971 EXTREMITY STUDY: CPT | Mod: 26,LT

## 2025-05-07 PROCEDURE — 36000 PLACE NEEDLE IN VEIN: CPT | Mod: RT

## 2025-05-07 RX ADMIN — Medication 40 MILLIGRAM(S): at 05:13

## 2025-05-07 RX ADMIN — LEVETIRACETAM 500 MILLIGRAM(S): 10 INJECTION, SOLUTION INTRAVENOUS at 17:03

## 2025-05-07 RX ADMIN — CLONAZEPAM 2 MILLIGRAM(S): 0.5 TABLET ORAL at 11:06

## 2025-05-07 RX ADMIN — IPRATROPIUM BROMIDE AND ALBUTEROL SULFATE 3 MILLILITER(S): .5; 2.5 SOLUTION RESPIRATORY (INHALATION) at 15:41

## 2025-05-07 RX ADMIN — CLONAZEPAM 2 MILLIGRAM(S): 0.5 TABLET ORAL at 21:35

## 2025-05-07 RX ADMIN — CLONAZEPAM 2 MILLIGRAM(S): 0.5 TABLET ORAL at 05:12

## 2025-05-07 RX ADMIN — HEPARIN SODIUM 5000 UNIT(S): 1000 INJECTION INTRAVENOUS; SUBCUTANEOUS at 17:03

## 2025-05-07 RX ADMIN — Medication 15 MILLILITER(S): at 13:58

## 2025-05-07 RX ADMIN — LEVETIRACETAM 500 MILLIGRAM(S): 10 INJECTION, SOLUTION INTRAVENOUS at 05:12

## 2025-05-07 RX ADMIN — Medication 100 MILLILITER(S): at 15:49

## 2025-05-07 RX ADMIN — AMLODIPINE BESYLATE 5 MILLIGRAM(S): 10 TABLET ORAL at 05:13

## 2025-05-07 RX ADMIN — Medication 15 MILLILITER(S): at 05:13

## 2025-05-07 RX ADMIN — Medication 1 APPLICATION(S): at 11:06

## 2025-05-07 RX ADMIN — Medication 40 MILLIGRAM(S): at 17:03

## 2025-05-07 RX ADMIN — HEPARIN SODIUM 5000 UNIT(S): 1000 INJECTION INTRAVENOUS; SUBCUTANEOUS at 05:13

## 2025-05-07 RX ADMIN — IPRATROPIUM BROMIDE AND ALBUTEROL SULFATE 3 MILLILITER(S): .5; 2.5 SOLUTION RESPIRATORY (INHALATION) at 08:38

## 2025-05-07 RX ADMIN — Medication 15 MILLILITER(S): at 21:35

## 2025-05-07 RX ADMIN — IPRATROPIUM BROMIDE AND ALBUTEROL SULFATE 3 MILLILITER(S): .5; 2.5 SOLUTION RESPIRATORY (INHALATION) at 03:28

## 2025-05-07 RX ADMIN — IPRATROPIUM BROMIDE AND ALBUTEROL SULFATE 3 MILLILITER(S): .5; 2.5 SOLUTION RESPIRATORY (INHALATION) at 21:30

## 2025-05-07 NOTE — PROGRESS NOTE ADULT - NS ATTEND AMEND GEN_ALL_CORE FT
Pt is a 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 transfusion dependence c/b iron overload with cirrhosis, HTN, and RUE DVT (12/2024) on NOAC presenting to [facility] on 3/15/25 from hematology office for vaso-occlusive sickle cell crisis with acute chest syndrome and hypoxemic respiratory failure with hospital course c/b left renal mass s/p left ureteroscopy with biopsy/stent placement (3/19) c/b acute on chronic anemia 2/2 hemorrhagic shock from left perirenal-subcapsular hemorrhage c/b PEA arrest with ROSC (3/20, unclear downtime) s/p emergent IR embolization but c/b severe post-cardiac arrest hypoxic-ischemic encephalopathy with failure to wean from ventilator s/p tracheostomy placement (4/4/25) and RCU transfer 4/18 for further medical management.     Pt with concern for hypoxic-ischemic encephalopathy post-cardiac arrest. Pt with some brainstem reflexes but otherwise remains unresponsive with no purposeful consciousness and functional quadriplegia with full dependence on ADLs. Pt with previous episodes of myoclonus. CT and MRI imaging with diffuse cortical injury. EEG with GPD vs. myoclonus, but also with severe diffuse/multifocal cerebral dysfunction. Neurology consulted, recs appreciated. Concern for poor neurologic prognosis with likely persistent vegetative state vs. minimal consciousness. Discussed with pt's family along with palliative and neurology team. Pt continues on Keppra + clonazepam + prn Dilaudid, myoclonus improved. Central fevers resolved, will monitor off bromocriptine 2/2 concern for transaminitis.     Pt with acute combined hypoxemic and hypercapnic respiratory failure with failure to wean from ventilator s/p tracheostomy placement (#7 Portex 4/4).  Will continue to wean vent as tolerated. Airway clearance therapy in place. Wean O2 supplementation for goal O2 saturation 90-95%. Aspiration precautions and oral hygiene in place. Trach care and suctioning as per RCU routine. Hospital course c/b tongue biting with evidence of tongue trauma now s/p bite block placement by OMFS followed by dental mouthguard mold creation (5/2) to arrive by 5/16.    Pt with sickle cell dz with hx vaso-occlusive crises and intermittent transfusion dependence previously on deferasirox and Voxelotor, now off all maintenance therapy. Hemolysis labs grossly stable. Free water in place. Superimposed hemorrhagic shock now resolved, last pRBC ~4/22. Discussed with hematology, would not restart medications at this time. Hospital course further c/b RIJ, left brachial, and LLE gastrocnemius DVT i/s/o chronic VTEs previously on NOAC. Pt has had multiple life-threatening bleeding while in hospital on A/C, will hold for now. Tolerating DVT ppx. Repeat serial duplex 5/5 to check for propagation. Discussed with IR team, not a candidate for IVCF at this time.     Pt with oropharyngeal dysphagia s/p PEG placement now tolerating feeds at goal rate. Bowel regimen in place prn. PPI ppx. No renal or endocrine issues.     Pt further with newly diagnosed left renal mass non-invasive urothelial carcinoma. As per hematology, given overall co-morbidities and multiorgan failure, pt is currently not a candidate for DMT.    Pt medically optimized for hospital discharge to LTCF with ventilator capabilities. Pt full code. DVT ppx in place. Palliative consulted, recs appreciated. Will continue to update family and discuss plan of care throughout hospitalization.

## 2025-05-07 NOTE — PROGRESS NOTE ADULT - SUBJECTIVE AND OBJECTIVE BOX
RCU PROGRESS NOTE     CHIEF COMPLAINT: Patient is a 44y old  Male who presents with a chief complaint of Referred by Hematologist for low Hg (05 May 2025 14:23)      INTERVAL EVENTS:  - No interval events overnight   - LLE DOPPLER pending  - DISPO planning     REVIEW OF SYSTEMS: Seen by bedside during AM rounds and unable to assess ROS second to vented/ HIE    Mode: AC/ CMV (Assist Control/ Continuous Mandatory Ventilation), RR (machine): 20, TV (machine): 460, FiO2: 30, PEEP: 6, ITime: 0.74, MAP: 11, PIP: 21      OBJECTIVE:  ICU Vital Signs Last 24 Hrs  T(C): 37.4 (06 May 2025 05:00), Max: 37.6 (06 May 2025 00:00)  T(F): 99.4 (06 May 2025 05:00), Max: 99.6 (06 May 2025 00:00)  HR: 108 (06 May 2025 07:31) (78 - 120)  BP: 134/78 (06 May 2025 05:00) (114/72 - 134/78)  BP(mean): --  ABP: --  ABP(mean): --  RR: 20 (06 May 2025 05:00) (20 - 29)  SpO2: 100% (06 May 2025 07:31) (97% - 100%)    O2 Parameters below as of 06 May 2025 05:00  Patient On (Oxygen Delivery Method): ventilator    O2 Concentration (%): 30      Mode: AC/ CMV (Assist Control/ Continuous Mandatory Ventilation), RR (machine): 20, TV (machine): 460, FiO2: 30, PEEP: 6, ITime: 0.74, MAP: 11, PIP: 21    - @ 07:01  -   @ 07:00  --------------------------------------------------------  IN: 2975 mL / OUT: 1800 mL / NET: 1175 mL      CAPILLARY BLOOD GLUCOSE  POCT Blood Glucose.: 137 mg/dL (05 May 2025 21:43)      HOSPITAL MEDICATIONS:  MEDICATIONS  (STANDING):  albuterol/ipratropium for Nebulization 3 milliLiter(s) Nebulizer every 6 hours  amLODIPine   Tablet 5 milliGRAM(s) Oral daily  chlorhexidine 0.12% Liquid 15 milliLiter(s) Oral Mucosa two times a day  chlorhexidine 2% Cloths 1 Application(s) Topical daily  clonazePAM  Tablet 2 milliGRAM(s) Oral every 8 hours  heparin   Injectable 5000 Unit(s) SubCutaneous every 12 hours  influenza   Vaccine 0.5 milliLiter(s) IntraMuscular once  levETIRAcetam  Solution 500 milliGRAM(s) Oral two times a day  pantoprazole   Suspension 40 milliGRAM(s) Oral every 12 hours    MEDICATIONS  (PRN):  acetaminophen   Oral Liquid .. 650 milliGRAM(s) Oral every 6 hours PRN Temp greater or equal to 38C (100.4F), Mild Pain (1 - 3)  HYDROmorphone  Injectable 0.5 milliGRAM(s) IV Push every 4 hours PRN dysynchronny      PHYSICAL EXAMINATION  General: NAD  HEENT: Trach present    Cards: S1/S2, no murmurs   Pulm: Course vent sounds bilaterally.   Abdomen: Soft, nondistended and nontender. PEG and daniel (+)   Extremities: No pedal edema. No active SABINO of BL upper and lower extremities.  Neurology: Awakens with eyes open, but does not follow commands with baseline HIE with no acute focal neurological deficits     LABS:                          Urinalysis Basic - ( 05 May 2025 05:10 )  Color: x / Appearance: x / SG: x / pH: x  Gluc: 127 mg/dL / Ketone: x  / Bili: x / Urobili: x   Blood: x / Protein: x / Nitrite: x   Leuk Esterase: x / RBC: x / WBC x   Sq Epi: x / Non Sq Epi: x / Bacteria: x        Venous Blood Gas:   @ 05:10  7.34/41/52/22/84.7  VBG Lactate: 1.5      PAST MEDICAL & SURGICAL HISTORY:  Sickle cell anemia      Gout      Deep vein thrombosis (DVT)      Iron overload      Hypertension      History of cholecystectomy      History of removal of Port-a-Cath          FAMILY HISTORY:  Family history of sickle cell trait (Father, Mother)    Patient's father is  (Father)    Patient's mother is  (Mother)        Social History:  Lives alone (15 Mar 2025 09:54)      RADIOLOGY:  [ ] Reviewed and interpreted by me    PULMONARY FUNCTION TESTS:    EKG: RCU PROGRESS NOTE     CHIEF COMPLAINT: Patient is a 44y old  Male who presents with a chief complaint of Referred by Hematologist for low Hg (05 May 2025 14:23)      INTERVAL EVENTS:  - No interval events overnight   - LLE DOPPLER with no change   - Dry mouth and biotene started   - Hypercalcemia and IVF rehydration started   - DISPO planning     REVIEW OF SYSTEMS: Seen by bedside during AM rounds and unable to assess ROS second to vented/ HIE    Mode: AC/ CMV (Assist Control/ Continuous Mandatory Ventilation), RR (machine): 20, TV (machine): 460, FiO2: 30, PEEP: 6, ITime: 0.74, MAP: 11, PIP: 21      OBJECTIVE:  ICU Vital Signs Last 24 Hrs  T(C): 37.4 (06 May 2025 05:00), Max: 37.6 (06 May 2025 00:00)  T(F): 99.4 (06 May 2025 05:00), Max: 99.6 (06 May 2025 00:00)  HR: 108 (06 May 2025 07:31) (78 - 120)  BP: 134/78 (06 May 2025 05:00) (114/72 - 134/78)  BP(mean): --  ABP: --  ABP(mean): --  RR: 20 (06 May 2025 05:00) (20 - 29)  SpO2: 100% (06 May 2025 07:31) (97% - 100%)    O2 Parameters below as of 06 May 2025 05:00  Patient On (Oxygen Delivery Method): ventilator    O2 Concentration (%): 30      Mode: AC/ CMV (Assist Control/ Continuous Mandatory Ventilation), RR (machine): 20, TV (machine): 460, FiO2: 30, PEEP: 6, ITime: 0.74, MAP: 11, PIP: 21    - @ 07:01  -  05-06 @ 07:00  --------------------------------------------------------  IN: 2975 mL / OUT: 1800 mL / NET: 1175 mL      CAPILLARY BLOOD GLUCOSE  POCT Blood Glucose.: 137 mg/dL (05 May 2025 21:43)      HOSPITAL MEDICATIONS:  MEDICATIONS  (STANDING):  albuterol/ipratropium for Nebulization 3 milliLiter(s) Nebulizer every 6 hours  amLODIPine   Tablet 5 milliGRAM(s) Oral daily  chlorhexidine 0.12% Liquid 15 milliLiter(s) Oral Mucosa two times a day  chlorhexidine 2% Cloths 1 Application(s) Topical daily  clonazePAM  Tablet 2 milliGRAM(s) Oral every 8 hours  heparin   Injectable 5000 Unit(s) SubCutaneous every 12 hours  influenza   Vaccine 0.5 milliLiter(s) IntraMuscular once  levETIRAcetam  Solution 500 milliGRAM(s) Oral two times a day  pantoprazole   Suspension 40 milliGRAM(s) Oral every 12 hours    MEDICATIONS  (PRN):  acetaminophen   Oral Liquid .. 650 milliGRAM(s) Oral every 6 hours PRN Temp greater or equal to 38C (100.4F), Mild Pain (1 - 3)  HYDROmorphone  Injectable 0.5 milliGRAM(s) IV Push every 4 hours PRN dysynchronny      PHYSICAL EXAMINATION  General: NAD  HEENT: Trach present    Cards: S1/S2, no murmurs   Pulm: Course vent sounds bilaterally.   Abdomen: Soft, nondistended and nontender. PEG and daniel (+)   Extremities: No pedal edema. No active SABINO of BL upper and lower extremities.  Neurology: Awakens with eyes open, but does not follow commands with baseline HIE with no acute focal neurological deficits     LABS:                                             6.3    18.71 )-----------( 277      ( 07 May 2025 05:30 )             19.3       05-07    145  |  114[H]  |  71[H]  ----------------------------<  118[H]  4.5   |  19[L]  |  1.23    Ca    11.3[H]      07 May 2025 05:30  Phos  3.8     05-07  Mg     2.50     05-    TPro  7.9  /  Alb  3.1[L]  /  TBili  2.3[H]  /  DBili  x   /  AST  84[H]  /  ALT  78[H]  /  AlkPhos  629[H]         Urinalysis Basic - ( 05 May 2025 05:10 )  Color: x / Appearance: x / SG: x / pH: x  Gluc: 127 mg/dL / Ketone: x  / Bili: x / Urobili: x   Blood: x / Protein: x / Nitrite: x   Leuk Esterase: x / RBC: x / WBC x   Sq Epi: x / Non Sq Epi: x / Bacteria: x        Venous Blood Gas:  05-05 @ 05:10  7.34/41/52/22/84.7  VBG Lactate: 1.5      PAST MEDICAL & SURGICAL HISTORY:  Sickle cell anemia      Gout      Deep vein thrombosis (DVT)      Iron overload      Hypertension      History of cholecystectomy      History of removal of Port-a-Cath          FAMILY HISTORY:  Family history of sickle cell trait (Father, Mother)    Patient's father is  (Father)    Patient's mother is  (Mother)        Social History:  Lives alone (15 Mar 2025 09:54)      RADIOLOGY:  [ ] Reviewed and interpreted by me    PULMONARY FUNCTION TESTS:    EKG:

## 2025-05-07 NOTE — PROCEDURE NOTE - NSPROCDETAILS_GEN_ALL_CORE
guidewire recovered/lumen(s) aspirated and flushed/sterile dressing applied/sterile technique, catheter placed/ultrasound guidance with use of sterile gel and probe cove
location identified, draped/prepped, sterile technique used/blood seen on insertion/dressing applied/flushes easily/secured in place/sterile technique, catheter placed
blood seen on insertion/dressing applied/flushes easily/secured in place
location identified, draped/prepped, sterile technique used, needle inserted/introduced/positive blood return obtained via catheter/connected to a pressurized flush line/sutured in place/hemostasis with direct pressure, dressing applied/Seldinger technique/all materials/supplies accounted for at end of procedure

## 2025-05-07 NOTE — PROCEDURE NOTE - ADDITIONAL PROCEDURE DETAILS
Patient requiring PIV access for medical management. Prior to procedure, patient was properly identified using name and . Site prepped using chlorhexidine and tourniquet applied. Ultrasound was used to identify a right, easily compressible, no thrombus, non-pulsatile. A  20G x 5.7cm Acc[place]th Ace Intravascular Catheter was introduced into the skin and inserted into the identified vessel. Flash seen in needle housing and placement confirmed on US. Guidewire advanced and catheter advanced over guide wire. Needle withdrawn and placement again confirmed with US visualization. Clave with saline flush attached and placement again confirmed with positive blood return/ flash. IV flushes easily without resistance, and no swelling appreciated at insertion site. Site cleaned with alcohol, Cavilon skin prep applied, and cath secured with central line dressing. Patient tolerated procedure well with no complications and no blood loss. Nursing staff informed and dressing labeled with initials, date and time. Dressing to be changed Qweekly.     DEMOND Pickens, PA-C  Department of Medicine/ [facility]  In house [facility] [id number]  In house Medicine [facility] [id number]  Reachable via teams

## 2025-05-07 NOTE — PROCEDURE NOTE - PROCEDURE DATE TIME, MLM
11-Apr-2025 14:16
04-Apr-2025 13:07
24-Mar-2025
07-May-2025 16:00
20-Mar-2025 13:39
24-Mar-2025 14:00
02-Apr-2025 15:19
20-Mar-2025 21:18
20-Mar-2025 13:41
02-Apr-2025 15:24

## 2025-05-07 NOTE — CHART NOTE - NSCHARTNOTEFT_GEN_A_CORE
[facility] PA NOTE     Called by RN for patient's brother by bedside angry and requested to speak with team. Presented by bedside and patient's brother Bala and sister by bedside with other family members. Per family request pass code requested and provided correctly by Brother. Brother granted permission to speak of patient's care with sister and other family members present.     Brother concerned about diagnosis of anoxic brain injury, and reported seeing movement of arms and legs. Discussed at length anoxic brain injury's role to patient's cerebrum, however brain stem and spinal cord reflexes remains intact. Brother expressed comprehension and conversation concluded.     May Tan, CECILIAS, PA-C  Department of Medicine/ [facility]  In house [facility] [id number]  In house Medicine [facility] [id number]  Reachable via teams

## 2025-05-07 NOTE — PROGRESS NOTE ADULT - ASSESSMENT
45 YO M with PMHx of sickle cell disease with prior acute chest syndrome requiring transfusion and exchange in the past (last 12/2024), iron overload, gout, HTN, and RUE DVT in 12/2024 on eliquis who presented on 3/15 by his hematologist second to anemia and hypoxia, down to 5.3 from baseline 7-8, with concern for vaso-occlusive crisis. While on medicine, anemia improved post transfusion and continued on SCC pain control PCA. Patient noted with left renal mass during prior admission and s/p L ureteroscopy with biopsy and stent placement on 3/19. Course complicated with hypoglycemia on 3/20 with RRT x 2 and found with severe anemia to 3.4 with hemorrhagic shock second to left perirenal and subcapsular hemorrhage and ultimately PEA arrest. ROSC achieved and transferred to MICU. s/p IR emobilization and course complicated anoxic brain injury, myoclonic jerks, GIB, MATA, HAGMA, DVTs (R IJ and LUE brachial), dysphagia, cirrhosis, and prolonged vent time s/p trach on 4/4. Transferred to [facility] on 4/18.     NEUROLOGY  # Anoxic brain injury   - s/p cardiac arrest on 3/20 with ROSC in 4 minutes   - CT HEAD post arrest with diffuse sulcal effacement with increased intracranial pressure, and few patchy foci of cortical blurring concern for HIE  - MRI brain post arrest with with diffuse global abnormal supratentorial cortical restricted diffusion consistent with global hypoxic injury   - Monitor for now    # Seizures vs myoclonic jerks   - Witness myoclonic jerking concerning for seizures with anoxic brain injury  - vEEG with abundant myoclonic seizures in the setting of a severe diffuse/multifocal cerebral dysfunction which can be seen in the setting of sedative effect or due to anoxia  - s/p valium 10 Q4H   - Continue on keppra   - Continue on klonopin 2mg TID   - Continue on dilaudid PRN     # Central fevers   - Persistent fever spikes noted with concern for central fevers   - Bromocriptine started on 4/23, however noted with rise in LFTs and now stopped on 4/27   - Monitor fever curve.     HEENT  # Tongue biting   - Noted with tongue trauma  - s/p bite block replacement by OMFS on 4/23  - s/p dental mouth guard scan on 5/2   - Mouth guard to be delivered on 5/16   - Dental will continue with mouth care and reposition bite block PRN    # Thrush   - On nystatin (4/28 - 5/4)   - Continue with mouth care     CARDIOLOGY  # Shock state likely hemorrhagic vs vasoplegia  - Acute renal bleed noted requiring multiple transfusions and pressors in ICU.   - TTE 3/22 with EF 63 with normal LVRVSF with PASP 49  - Weaned off pressors in ICU and complicated by hypertension.   - Continue on norvasc 5 QD   - Monitor BP     # Autonomic vs pain  - Mild HTN and tachypnea noted likely pain induced vs autonomia?   - Continue on dilaudid pushes PRN    PULMONARY  # Respiratory failure   - Presented with SCC and hypoxia likely from anemia with no signs of acute chest, however noted with PEA with hemorrhagic shock post renal bx requiring intubation.   - Prolonged intubation and s/p 7 PORTEX trach on 4/4   - Continue on vent 20/460/6/30 with nebs and chest PT  - Continue on dilaudid PRN for dyssynchrony     GI  # Shock liver vs cirrhosis with sickle cell   - Transaminitis noted in ICU and thought to be second to shock liver likely second to hemorrhagic shock vs cardiac arrest, however LFTs remains elevated with acute on chronic hyperbilirubinemia.   - US ABD suggestive of early cirrhotic changes, cholecystectomy, and CBD 9mm   - CT AP with cirrhosis   - LFTs rising with bromocriptine   - Holding further bromocriptine   - Trend LFTs and bili     # GIB  - Anemia with GIB noted in ICU   - s/p EGD 4/11 with cauterization of gastric ulcer & hemostatic spraying of three duodenal ulcers. Gastric ulcer likely due to NGT trauma.   - Continue on PPI BID   - Monitor stools and HH     # Oropharyngeal dysphagia   - GI unable to place PEG due to hepatomegaly.   - IR unable to place PEG second to no safe window   - s/p open G TUBE placement on 4/18   - Continue on TF    RENAL  # Acute renal failure and HAGMA second to shock state   - Scr on admission was 1.25 (3/15/25) and increased to 6s while in ICU and s/p HD 3/24-3/29 and then again on 4/4.   - L SHILEY removed on 4/15   - Continue on HCO3 1300 TID tabs with improved acidosis, however hypernatremic and stopped.   - Monitor renal function, acidosis, and UOP     # L renal mass bx c/b hematoma   - Renal bx as below on 3/19  - Hemorrhagic shock and PEA arrest on 3/20   - Renal US with large L renal subcapsular hematoma.    - IR left renal angiogram and embolization on 3/20   - CT 4/6 with unchanged large left subcapsular and perinephric hematoma.  - CT A/P (4/20) showing stable subcapsular renal hematoma and NO new active bleeds.  - Monitor for now    # Urinary retention   - Passed TOV on 4/23   - Urinary retention again and likely neurogenic bladder   - Dave in place and hold further TOVs    # Hypernatremia  - s/p D5W   - Continue on  Q6H however sodium worsening   - Continue on FWF with no improvement.   - Continued on D5 with improvement   - Continue on  Q6H and monitor sodium     # Hypercalcemia likely from dehydration   - Rehydrate as above and monitor Ca     # Hyperphosphatemia   - Phos elevation likely second to renal failure   - PTH elevated, however Phos slowly down trending   - Monitor phos    INFECTIOUS DISEASE   # Central fevers  - Recurrent fevers and complete LVQ empirically as below   - Started on bromocriptine with improvement in fever curve, however noted with transaminitis and bromocriptine stopped with return of elevated temperature intermittently.   - Patient with known R IJ DVT and L kidney hematoma and now new LLE DVT that can cause intermittent fevers.   - Hold work up for now   - Hold further ABX for now   - Monitor fever curve.     # Questionable UTI  - Recurrent fevers with POSITIVE UA and s/p LVQ (4/23 - 4/28)   - Monitor off ABX     # VAP   - Post arrest noted with concern for aspiration and VAP in ICU   - Flu, SCx, BAL, BCx and UCx negative   - MRSA PCR with MSSA and completed bactroban in ICU   - Completed aztreonam (3/22-3/27) then casey (3/27-3/31) and vanco by dose in ICU   - CXR in ICU with RUL consolidation and completed recurrent casey course in ICU (4/2-4/7).     HEMATOLOGIC  # Anemia with SCC vs L kidney bleed vs GIB   - Baseline hemoglobin outpatient ~6.0-7.0 and sent in for anemia down to 5.3 without signs of bleeding and more likely SCC.    - Transfusion given on admission and improved back to baseline.   - Course complicated by hemorrhagic shock from left renal hematoma and HH down to 3.4.  - s/p 10U PRBC total while in ICU   - s/p PRBC pre-GTUBE with HH increased to 8.0  - c/b rapid decline in HH post GTUBE likely normalizing to baseline 6-7 as RPT CT AP post G TUBE with new bleeds and no acute signs of sickling.   - s/p PRBC 4/22   - Monitor HH     # SCD   - Discontinued deferasirox due to current renal failure   - Monitor Sickle labs QD (CBC with Diff, Retic, BMP, Hepatic Function, LDH and Hapto, and VBG).     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 Discussed with IR recommend holding IVC filter for now and pending RPT DOPPLER.   - Continue on DVT PPX HSQ   - LUE and LLE precautions     # LUE arm infiltration   - HCO3 GTT infiltration in ICU   - Seen by hand sx and no compartment syndrome concern   - Monitor for now     # RUE blood infiltration   - RUE blood transfusion infiltration noted on 4/17 with area of ecchymosis   - RPT DOPPLER with known R IJ DVT, however no upper arm thrombus or issues in area of infiltration  - Monitor site for now    ENDOCRINE   # Glycemic control   - Monitor BG   - No hx of DM2     SKIN   - Wound care reccs appreciated    ETHICS   - FULL CODE     DISPO - vent facility when able 45 YO M with PMHx of sickle cell disease with prior acute chest syndrome requiring transfusion and exchange in the past (last 12/2024), iron overload, gout, HTN, and RUE DVT in 12/2024 on eliquis who presented on 3/15 by his hematologist second to anemia and hypoxia, down to 5.3 from baseline 7-8, with concern for vaso-occlusive crisis. While on medicine, anemia improved post transfusion and continued on SCC pain control PCA. Patient noted with left renal mass during prior admission and s/p L ureteroscopy with biopsy and stent placement on 3/19. Course complicated with hypoglycemia on 3/20 with RRT x 2 and found with severe anemia to 3.4 with hemorrhagic shock second to left perirenal and subcapsular hemorrhage and ultimately PEA arrest. ROSC achieved and transferred to MICU. s/p IR emobilization and course complicated anoxic brain injury, myoclonic jerks, GIB, MATA, HAGMA, DVTs (R IJ and LUE brachial), dysphagia, cirrhosis, and prolonged vent time s/p trach on 4/4. Transferred to RCU on 4/18.     NEUROLOGY  # Anoxic brain injury   - s/p cardiac arrest on 3/20 with ROSC in 4 minutes   - CT HEAD post arrest with diffuse sulcal effacement with increased intracranial pressure, and few patchy foci of cortical blurring concern for HIE  - MRI brain post arrest with with diffuse global abnormal supratentorial cortical restricted diffusion consistent with global hypoxic injury   - Monitor for now    # Seizures vs myoclonic jerks   - Witness myoclonic jerking concerning for seizures with anoxic brain injury  - vEEG with abundant myoclonic seizures in the setting of a severe diffuse/multifocal cerebral dysfunction which can be seen in the setting of sedative effect or due to anoxia  - s/p valium 10 Q4H   - Continue on keppra   - Continue on klonopin 2mg TID   - Continue on dilaudid PRN     # Central fevers   - Persistent fever spikes noted with concern for central fevers   - Bromocriptine started on 4/23, however noted with rise in LFTs and now stopped on 4/27   - Monitor fever curve.     HEENT  # Tongue biting   - Noted with tongue trauma  - s/p bite block replacement by OMFS on 4/23  - s/p dental mouth guard scan on 5/2   - Mouth guard to be delivered on 5/16   - Dental will continue with mouth care and reposition bite block PRN    # Thrush   - On nystatin (4/28 - 5/4)   - Continue with mouth care     CARDIOLOGY  # Shock state likely hemorrhagic vs vasoplegia  - Acute renal bleed noted requiring multiple transfusions and pressors in ICU.   - TTE 3/22 with EF 63 with normal LVRVSF with PASP 49  - Weaned off pressors in ICU and complicated by hypertension.   - Continue on norvasc 5 QD   - Monitor BP     # Autonomic vs pain  - Mild HTN and tachypnea noted likely pain induced vs autonomia?   - Continue on dilaudid pushes PRN    PULMONARY  # Respiratory failure   - Presented with SCC and hypoxia likely from anemia with no signs of acute chest, however noted with PEA with hemorrhagic shock post renal bx requiring intubation.   - Prolonged intubation and s/p 7 PORTEX trach on 4/4   - Continue on vent 20/460/6/30 with nebs and chest PT  - Continue on dilaudid PRN for dyssynchrony     GI  # Shock liver vs cirrhosis with sickle cell   - Transaminitis noted in ICU and thought to be second to shock liver likely second to hemorrhagic shock vs cardiac arrest, however LFTs remains elevated with acute on chronic hyperbilirubinemia.   - US ABD suggestive of early cirrhotic changes, cholecystectomy, and CBD 9mm   - CT AP with cirrhosis   - LFTs rising with bromocriptine   - Holding further bromocriptine   - Trend LFTs and bili     # GIB  - Anemia with GIB noted in ICU   - s/p EGD 4/11 with cauterization of gastric ulcer & hemostatic spraying of three duodenal ulcers. Gastric ulcer likely due to NGT trauma.   - Continue on PPI BID   - Monitor stools and HH     # Oropharyngeal dysphagia   - GI unable to place PEG due to hepatomegaly.   - IR unable to place PEG second to no safe window   - s/p open G TUBE placement on 4/18   - Continue on TF    RENAL  # Acute renal failure and HAGMA second to shock state   - Scr on admission was 1.25 (3/15/25) and increased to 6s while in ICU and s/p HD 3/24-3/29 and then again on 4/4.   - L SHILEY removed on 4/15   - Continue on HCO3 1300 TID tabs with improved acidosis, however hypernatremic and stopped.   - Monitor renal function, acidosis, and UOP     # L renal mass bx c/b hematoma   - Renal bx as below on 3/19  - Hemorrhagic shock and PEA arrest on 3/20   - Renal US with large L renal subcapsular hematoma.    - IR left renal angiogram and embolization on 3/20   - CT 4/6 with unchanged large left subcapsular and perinephric hematoma.  - CT A/P (4/20) showing stable subcapsular renal hematoma and NO new active bleeds.  - Monitor for now    # Urinary retention   - Passed TOV on 4/23   - Urinary retention again and likely neurogenic bladder   - Dave in place and hold further TOVs    # Hypernatremia  - s/p D5W   - Continue on  Q6H however sodium worsening   - Continue on FWF with no improvement.   - Continued on D5 with improvement   - Continue on  Q6H and monitor sodium     # Hypercalcemia likely from dehydration   - Rehydrate and monitor Ca     # Hyperphosphatemia   - Phos elevation likely second to renal failure   - PTH elevated, however Phos slowly down trending   - Monitor phos    INFECTIOUS DISEASE   # Central fevers  - Recurrent fevers and complete LVQ empirically as below   - Started on bromocriptine with improvement in fever curve, however noted with transaminitis and bromocriptine stopped with return of elevated temperature intermittently.   - Patient with known R IJ DVT and L kidney hematoma and now new LLE DVT that can cause intermittent fevers.   - Hold work up for now   - Hold further ABX for now   - Monitor fever curve.     # Questionable UTI  - Recurrent fevers with POSITIVE UA and s/p LVQ (4/23 - 4/28)   - Monitor off ABX     # VAP   - Post arrest noted with concern for aspiration and VAP in ICU   - Flu, SCx, BAL, BCx and UCx negative   - MRSA PCR with MSSA and completed bactroban in ICU   - Completed aztreonam (3/22-3/27) then casey (3/27-3/31) and vanco by dose in ICU   - CXR in ICU with RUL consolidation and completed recurrent casey course in ICU (4/2-4/7).     HEMATOLOGIC  # Anemia with SCC vs L kidney bleed vs GIB   - Baseline hemoglobin outpatient ~6.0-7.0 and sent in for anemia down to 5.3 without signs of bleeding and more likely SCC.    - Transfusion given on admission and improved back to baseline.   - Course complicated by hemorrhagic shock from left renal hematoma and HH down to 3.4.  - s/p 10U PRBC total while in ICU   - s/p PRBC pre-GTUBE with HH increased to 8.0  - c/b rapid decline in HH post GTUBE likely normalizing to baseline 6-7 as RPT CT AP post G TUBE with new bleeds and no acute signs of sickling.   - s/p PRBC 4/22   - Monitor HH     # SCD   - Discontinued deferasirox due to current renal failure   - Monitor Sickle labs QD (CBC with Diff, Retic, BMP, Hepatic Function, LDH and Hapto, and VBG).     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   - Discussed with IR recommend holding IVC filter for now   - Continue on DVT PPX HSQ   - LUE and LLE precautions     # LUE arm infiltration   - HCO3 GTT infiltration in ICU   - Seen by hand sx and no compartment syndrome concern   - Monitor for now     # RUE blood infiltration   - RUE blood transfusion infiltration noted on 4/17 with area of ecchymosis   - RPT DOPPLER with known R IJ DVT, however no upper arm thrombus or issues in area of infiltration  - Monitor site for now    ENDOCRINE   # Glycemic control   - Monitor BG   - No hx of DM2     SKIN   - Wound care reccs appreciated    ETHICS   - FULL CODE     DISPO - vent facility when able

## 2025-05-07 NOTE — PROCEDURE NOTE - NSSITEPREP_SKIN_A_CORE
chlorhexidine
alcohol
chlorhexidine/Adherence to aseptic technique: hand hygiene prior to donning barriers (gown, gloves), don cap and mask, sterile drape over patient
chlorhexidine
chlorhexidine

## 2025-05-07 NOTE — PROCEDURE NOTE - NSPROCNAME_GEN_A_CORE
Peripheral Line Insertion
Bronchoscopy
Central Line Insertion
Interventional Radiology
Bronchoscopy
Central Line Insertion
Peripheral Line Insertion
Arterial Puncture/Cannulation
Central Line Insertion
Central Line Insertion

## 2025-05-08 LAB
ALBUMIN SERPL ELPH-MCNC: 2.9 G/DL — LOW (ref 3.3–5)
ALP SERPL-CCNC: 618 U/L — HIGH (ref 40–120)
ALT FLD-CCNC: 76 U/L — HIGH (ref 4–41)
ANION GAP SERPL CALC-SCNC: 12 MMOL/L — SIGNIFICANT CHANGE UP (ref 7–14)
AST SERPL-CCNC: 89 U/L — HIGH (ref 4–40)
BASOPHILS # BLD AUTO: 0.08 K/UL — SIGNIFICANT CHANGE UP (ref 0–0.2)
BASOPHILS NFR BLD AUTO: 0.5 % — SIGNIFICANT CHANGE UP (ref 0–2)
BILIRUB SERPL-MCNC: 2.4 MG/DL — HIGH (ref 0.2–1.2)
BUN SERPL-MCNC: 64 MG/DL — HIGH (ref 7–23)
CALCIUM SERPL-MCNC: 10.9 MG/DL — HIGH (ref 8.4–10.5)
CHLORIDE SERPL-SCNC: 113 MMOL/L — HIGH (ref 98–107)
CO2 SERPL-SCNC: 18 MMOL/L — LOW (ref 22–31)
CREAT SERPL-MCNC: 1.13 MG/DL — SIGNIFICANT CHANGE UP (ref 0.5–1.3)
EGFR: 82 ML/MIN/1.73M2 — SIGNIFICANT CHANGE UP
EGFR: 82 ML/MIN/1.73M2 — SIGNIFICANT CHANGE UP
EOSINOPHIL # BLD AUTO: 0.37 K/UL — SIGNIFICANT CHANGE UP (ref 0–0.5)
EOSINOPHIL NFR BLD AUTO: 2.2 % — SIGNIFICANT CHANGE UP (ref 0–6)
GLUCOSE BLDC GLUCOMTR-MCNC: 125 MG/DL — HIGH (ref 70–99)
GLUCOSE BLDC GLUCOMTR-MCNC: 137 MG/DL — HIGH (ref 70–99)
GLUCOSE BLDC GLUCOMTR-MCNC: 143 MG/DL — HIGH (ref 70–99)
GLUCOSE SERPL-MCNC: 131 MG/DL — HIGH (ref 70–99)
HAPTOGLOB SERPL-MCNC: 52 MG/DL — SIGNIFICANT CHANGE UP (ref 34–200)
HCT VFR BLD CALC: 20.9 % — CRITICAL LOW (ref 39–50)
HGB BLD-MCNC: 7 G/DL — CRITICAL LOW (ref 13–17)
IANC: 12.99 K/UL — HIGH (ref 1.8–7.4)
IMM GRANULOCYTES NFR BLD AUTO: 0.6 % — SIGNIFICANT CHANGE UP (ref 0–0.9)
LDH SERPL L TO P-CCNC: 200 U/L — SIGNIFICANT CHANGE UP (ref 135–225)
LYMPHOCYTES # BLD AUTO: 13.1 % — SIGNIFICANT CHANGE UP (ref 13–44)
LYMPHOCYTES # BLD AUTO: 2.21 K/UL — SIGNIFICANT CHANGE UP (ref 1–3.3)
MAGNESIUM SERPL-MCNC: 2.3 MG/DL — SIGNIFICANT CHANGE UP (ref 1.6–2.6)
MCHC RBC-ENTMCNC: 30.7 PG — SIGNIFICANT CHANGE UP (ref 27–34)
MCHC RBC-ENTMCNC: 33.5 G/DL — SIGNIFICANT CHANGE UP (ref 32–36)
MCV RBC AUTO: 91.7 FL — SIGNIFICANT CHANGE UP (ref 80–100)
MONOCYTES # BLD AUTO: 1.09 K/UL — HIGH (ref 0–0.9)
MONOCYTES NFR BLD AUTO: 6.5 % — SIGNIFICANT CHANGE UP (ref 2–14)
NEUTROPHILS # BLD AUTO: 12.99 K/UL — HIGH (ref 1.8–7.4)
NEUTROPHILS NFR BLD AUTO: 77.1 % — HIGH (ref 43–77)
NRBC # BLD AUTO: 0 K/UL — SIGNIFICANT CHANGE UP (ref 0–0)
NRBC # FLD: 0 K/UL — SIGNIFICANT CHANGE UP (ref 0–0)
NRBC BLD AUTO-RTO: 0 /100 WBCS — SIGNIFICANT CHANGE UP (ref 0–0)
PHOSPHATE SERPL-MCNC: 3.3 MG/DL — SIGNIFICANT CHANGE UP (ref 2.5–4.5)
PLATELET # BLD AUTO: 251 K/UL — SIGNIFICANT CHANGE UP (ref 150–400)
POTASSIUM SERPL-MCNC: 4.5 MMOL/L — SIGNIFICANT CHANGE UP (ref 3.5–5.3)
POTASSIUM SERPL-SCNC: 4.5 MMOL/L — SIGNIFICANT CHANGE UP (ref 3.5–5.3)
PROT SERPL-MCNC: 7.7 G/DL — SIGNIFICANT CHANGE UP (ref 6–8.3)
RBC # BLD: 2.28 M/UL — LOW (ref 4.2–5.8)
RBC # BLD: 2.28 M/UL — LOW (ref 4.2–5.8)
RBC # FLD: 18.4 % — HIGH (ref 10.3–14.5)
RETICS #: 137.8 K/UL — HIGH (ref 25–125)
RETICS/RBC NFR: 5.9 % — HIGH (ref 0.5–2.5)
SODIUM SERPL-SCNC: 143 MMOL/L — SIGNIFICANT CHANGE UP (ref 135–145)
WBC # BLD: 16.84 K/UL — HIGH (ref 3.8–10.5)
WBC # FLD AUTO: 16.84 K/UL — HIGH (ref 3.8–10.5)

## 2025-05-08 PROCEDURE — 99233 SBSQ HOSP IP/OBS HIGH 50: CPT

## 2025-05-08 RX ORDER — CLONAZEPAM 0.5 MG/1
2 TABLET ORAL EVERY 8 HOURS
Refills: 0 | Status: DISCONTINUED | OUTPATIENT
Start: 2025-05-08 | End: 2025-05-15

## 2025-05-08 RX ORDER — HYDROMORPHONE/SOD CHLOR,ISO/PF 2 MG/10 ML
0.5 SYRINGE (ML) INJECTION EVERY 4 HOURS
Refills: 0 | Status: DISCONTINUED | OUTPATIENT
Start: 2025-05-08 | End: 2025-05-15

## 2025-05-08 RX ADMIN — CLONAZEPAM 2 MILLIGRAM(S): 0.5 TABLET ORAL at 21:05

## 2025-05-08 RX ADMIN — IPRATROPIUM BROMIDE AND ALBUTEROL SULFATE 3 MILLILITER(S): .5; 2.5 SOLUTION RESPIRATORY (INHALATION) at 20:48

## 2025-05-08 RX ADMIN — AMLODIPINE BESYLATE 5 MILLIGRAM(S): 10 TABLET ORAL at 05:20

## 2025-05-08 RX ADMIN — Medication 40 MILLIGRAM(S): at 17:02

## 2025-05-08 RX ADMIN — HEPARIN SODIUM 5000 UNIT(S): 1000 INJECTION INTRAVENOUS; SUBCUTANEOUS at 05:20

## 2025-05-08 RX ADMIN — Medication 1 APPLICATION(S): at 11:35

## 2025-05-08 RX ADMIN — Medication 15 MILLILITER(S): at 21:05

## 2025-05-08 RX ADMIN — Medication 15 MILLILITER(S): at 13:14

## 2025-05-08 RX ADMIN — Medication 40 MILLIGRAM(S): at 05:22

## 2025-05-08 RX ADMIN — LEVETIRACETAM 500 MILLIGRAM(S): 10 INJECTION, SOLUTION INTRAVENOUS at 05:19

## 2025-05-08 RX ADMIN — CLONAZEPAM 2 MILLIGRAM(S): 0.5 TABLET ORAL at 13:17

## 2025-05-08 RX ADMIN — CLONAZEPAM 2 MILLIGRAM(S): 0.5 TABLET ORAL at 05:19

## 2025-05-08 RX ADMIN — Medication 15 MILLILITER(S): at 05:23

## 2025-05-08 RX ADMIN — IPRATROPIUM BROMIDE AND ALBUTEROL SULFATE 3 MILLILITER(S): .5; 2.5 SOLUTION RESPIRATORY (INHALATION) at 15:11

## 2025-05-08 RX ADMIN — IPRATROPIUM BROMIDE AND ALBUTEROL SULFATE 3 MILLILITER(S): .5; 2.5 SOLUTION RESPIRATORY (INHALATION) at 03:10

## 2025-05-08 RX ADMIN — HEPARIN SODIUM 5000 UNIT(S): 1000 INJECTION INTRAVENOUS; SUBCUTANEOUS at 17:02

## 2025-05-08 RX ADMIN — LEVETIRACETAM 500 MILLIGRAM(S): 10 INJECTION, SOLUTION INTRAVENOUS at 17:01

## 2025-05-08 RX ADMIN — IPRATROPIUM BROMIDE AND ALBUTEROL SULFATE 3 MILLILITER(S): .5; 2.5 SOLUTION RESPIRATORY (INHALATION) at 07:22

## 2025-05-08 NOTE — PROGRESS NOTE ADULT - SUBJECTIVE AND OBJECTIVE BOX
CHIEF COMPLAINT:Patient is a 44y old  Male who presents with a chief complaint of Referred by Hematologist for low Hg (07 May 2025 07:15)      INTERVAL EVENTS:     ROS: Seen by bedside during AM rounds     OBJECTIVE:  ICU Vital Signs Last 24 Hrs  T(C): 37.2 (08 May 2025 04:00), Max: 37.3 (07 May 2025 20:00)  T(F): 99 (08 May 2025 04:00), Max: 99.1 (07 May 2025 20:00)  HR: 112 (08 May 2025 07:24) (78 - 120)  BP: 134/80 (08 May 2025 04:00) (130/72 - 141/72)  BP(mean): --  ABP: --  ABP(mean): --  RR: 20 (08 May 2025 04:00) (20 - 20)  SpO2: 100% (08 May 2025 07:24) (100% - 100%)    O2 Parameters below as of 08 May 2025 04:00  Patient On (Oxygen Delivery Method): ventilator    O2 Concentration (%): 30      Mode: AC/ CMV (Assist Control/ Continuous Mandatory Ventilation), RR (machine): 20, TV (machine): 460, FiO2: 30, PEEP: 6, ITime: 0.74, MAP: 11, PIP: 22    05-07 @ 07:01  -  05-08 @ 07:00  --------------------------------------------------------  IN: 0 mL / OUT: 1000 mL / NET: -1000 mL      CAPILLARY BLOOD GLUCOSE      POCT Blood Glucose.: 125 mg/dL (08 May 2025 00:04)      PHYSICAL EXAM:  General:   HEENT:   Lymph Nodes:  Neck:   Respiratory:   Cardiovascular:   Abdomen:   Extremities:   Skin:   Neurological:  Psychiatry:    Mode: AC/ CMV (Assist Control/ Continuous Mandatory Ventilation)  RR (machine): 20  TV (machine): 460  FiO2: 30  PEEP: 6  ITime: 0.74  MAP: 11  PIP: 22      HOSPITAL MEDICATIONS:  MEDICATIONS  (STANDING):  albuterol/ipratropium for Nebulization 3 milliLiter(s) Nebulizer every 6 hours  amLODIPine   Tablet 5 milliGRAM(s) Oral daily  Biotene Dry Mouth Oral Rinse 15 milliLiter(s) Swish and Spit every 8 hours  chlorhexidine 2% Cloths 1 Application(s) Topical daily  clonazePAM  Tablet 2 milliGRAM(s) Oral every 8 hours  heparin   Injectable 5000 Unit(s) SubCutaneous every 12 hours  influenza   Vaccine 0.5 milliLiter(s) IntraMuscular once  levETIRAcetam  Solution 500 milliGRAM(s) Oral two times a day  pantoprazole   Suspension 40 milliGRAM(s) Oral every 12 hours  sodium chloride 0.9%. 1000 milliLiter(s) (100 mL/Hr) IV Continuous <Continuous>    MEDICATIONS  (PRN):  acetaminophen   Oral Liquid .. 650 milliGRAM(s) Oral every 6 hours PRN Temp greater or equal to 38C (100.4F), Mild Pain (1 - 3)  HYDROmorphone  Injectable 0.5 milliGRAM(s) IV Push every 4 hours PRN dysynchronny      LABS:                        6.3    18.71 )-----------( 277      ( 07 May 2025 05:30 )             19.3     05-07    145  |  114[H]  |  71[H]  ----------------------------<  118[H]  4.5   |  19[L]  |  1.23    Ca    11.3[H]      07 May 2025 05:30  Phos  3.8     05-07  Mg     2.50     05-07    TPro  7.9  /  Alb  3.1[L]  /  TBili  2.3[H]  /  DBili  x   /  AST  84[H]  /  ALT  78[H]  /  AlkPhos  629[H]  05-07      Urinalysis Basic - ( 07 May 2025 05:30 )    Color: x / Appearance: x / SG: x / pH: x  Gluc: 118 mg/dL / Ketone: x  / Bili: x / Urobili: x   Blood: x / Protein: x / Nitrite: x   Leuk Esterase: x / RBC: x / WBC x   Sq Epi: x / Non Sq Epi: x / Bacteria: x         CHIEF COMPLAINT:Patient is a 44y old  Male who presents with a chief complaint of Referred by Hematologist for low Hg (07 May 2025 07:15)      INTERVAL EVENTS:     ROS: Seen by bedside during AM rounds     OBJECTIVE:  ICU Vital Signs Last 24 Hrs  T(C): 37.2 (08 May 2025 04:00), Max: 37.3 (07 May 2025 20:00)  T(F): 99 (08 May 2025 04:00), Max: 99.1 (07 May 2025 20:00)  HR: 112 (08 May 2025 07:24) (78 - 120)  BP: 134/80 (08 May 2025 04:00) (130/72 - 141/72)  BP(mean): --  ABP: --  ABP(mean): --  RR: 20 (08 May 2025 04:00) (20 - 20)  SpO2: 100% (08 May 2025 07:24) (100% - 100%)    O2 Parameters below as of 08 May 2025 04:00  Patient On (Oxygen Delivery Method): ventilator    O2 Concentration (%): 30      Mode: AC/ CMV (Assist Control/ Continuous Mandatory Ventilation), RR (machine): 20, TV (machine): 460, FiO2: 30, PEEP: 6, ITime: 0.74, MAP: 11, PIP: 22    05-07 @ 07:01  -  05-08 @ 07:00  --------------------------------------------------------  IN: 0 mL / OUT: 1000 mL / NET: -1000 mL      CAPILLARY BLOOD GLUCOSE      POCT Blood Glucose.: 125 mg/dL (08 May 2025 00:04)      PHYSICAL EXAM:  General: NAD   Neck: (+) Trach tube noted, site c/d/i.  Cards: S1/S2, no murmurs   Pulm: CTA bilaterally with sligiht scant end expiratory coarse wheezes. No resp distress.    Abdomen: Soft, NTND. (+) PEG noted, site c/d/i. Midline surgical incision wound c/d/i.   Extremities: LUE > RUE edema. B/l LE edema. Extremities warm to touch.  Neurology: Anoxic. PERRL. Nonverbal. Not following commands. Withdraws slightly to pain in b/l LE.   Skin: warm to touch, color appropriate for ethnicity. Refer to RN assessment for further details.      Mode: AC/ CMV (Assist Control/ Continuous Mandatory Ventilation)  RR (machine): 20  TV (machine): 460  FiO2: 30  PEEP: 6  ITime: 0.74  MAP: 11  PIP: 22      HOSPITAL MEDICATIONS:  MEDICATIONS  (STANDING):  albuterol/ipratropium for Nebulization 3 milliLiter(s) Nebulizer every 6 hours  amLODIPine   Tablet 5 milliGRAM(s) Oral daily  Biotene Dry Mouth Oral Rinse 15 milliLiter(s) Swish and Spit every 8 hours  chlorhexidine 2% Cloths 1 Application(s) Topical daily  clonazePAM  Tablet 2 milliGRAM(s) Oral every 8 hours  heparin   Injectable 5000 Unit(s) SubCutaneous every 12 hours  influenza   Vaccine 0.5 milliLiter(s) IntraMuscular once  levETIRAcetam  Solution 500 milliGRAM(s) Oral two times a day  pantoprazole   Suspension 40 milliGRAM(s) Oral every 12 hours  sodium chloride 0.9%. 1000 milliLiter(s) (100 mL/Hr) IV Continuous <Continuous>    MEDICATIONS  (PRN):  acetaminophen   Oral Liquid .. 650 milliGRAM(s) Oral every 6 hours PRN Temp greater or equal to 38C (100.4F), Mild Pain (1 - 3)  HYDROmorphone  Injectable 0.5 milliGRAM(s) IV Push every 4 hours PRN dysynchronny      LABS:                        6.3    18.71 )-----------( 277      ( 07 May 2025 05:30 )             19.3     05-07    145  |  114[H]  |  71[H]  ----------------------------<  118[H]  4.5   |  19[L]  |  1.23    Ca    11.3[H]      07 May 2025 05:30  Phos  3.8     05-07  Mg     2.50     05-07    TPro  7.9  /  Alb  3.1[L]  /  TBili  2.3[H]  /  DBili  x   /  AST  84[H]  /  ALT  78[H]  /  AlkPhos  629[H]  05-07      Urinalysis Basic - ( 07 May 2025 05:30 )    Color: x / Appearance: x / SG: x / pH: x  Gluc: 118 mg/dL / Ketone: x  / Bili: x / Urobili: x   Blood: x / Protein: x / Nitrite: x   Leuk Esterase: x / RBC: x / WBC x   Sq Epi: x / Non Sq Epi: x / Bacteria: x         CHIEF COMPLAINT:Patient is a 44y old  Male who presents with a chief complaint of Referred by Hematologist for low Hg (07 May 2025 07:15)      INTERVAL EVENTS: no acute overnight events noted.     ROS: Unable to obtain ROS given patient is anoxic.     OBJECTIVE:  ICU Vital Signs Last 24 Hrs  T(C): 37.2 (08 May 2025 04:00), Max: 37.3 (07 May 2025 20:00)  T(F): 99 (08 May 2025 04:00), Max: 99.1 (07 May 2025 20:00)  HR: 112 (08 May 2025 07:24) (78 - 120)  BP: 134/80 (08 May 2025 04:00) (130/72 - 141/72)  BP(mean): --  ABP: --  ABP(mean): --  RR: 20 (08 May 2025 04:00) (20 - 20)  SpO2: 100% (08 May 2025 07:24) (100% - 100%)    O2 Parameters below as of 08 May 2025 04:00  Patient On (Oxygen Delivery Method): ventilator    O2 Concentration (%): 30      Mode: AC/ CMV (Assist Control/ Continuous Mandatory Ventilation), RR (machine): 20, TV (machine): 460, FiO2: 30, PEEP: 6, ITime: 0.74, MAP: 11, PIP: 22    05-07 @ 07:01  -  05-08 @ 07:00  --------------------------------------------------------  IN: 0 mL / OUT: 1000 mL / NET: -1000 mL      CAPILLARY BLOOD GLUCOSE      POCT Blood Glucose.: 125 mg/dL (08 May 2025 00:04)      PHYSICAL EXAM:  General: NAD   Neck: (+) Trach tube noted, site c/d/i.  Cards: S1/S2, no murmurs   Pulm: CTA bilaterally with sligiht scant end expiratory coarse wheezes. No resp distress.    Abdomen: Soft, NTND. (+) PEG noted, site c/d/i. Midline surgical incision wound c/d/i.   Extremities: LUE > RUE edema. B/l LE edema. Extremities warm to touch.  Neurology: Anoxic. PERRL. Nonverbal. Not following commands. Withdraws slightly to pain in b/l LE.   Skin: warm to touch, color appropriate for ethnicity. Refer to RN assessment for further details.      Mode: AC/ CMV (Assist Control/ Continuous Mandatory Ventilation)  RR (machine): 20  TV (machine): 460  FiO2: 30  PEEP: 6  ITime: 0.74  MAP: 11  PIP: 22      HOSPITAL MEDICATIONS:  MEDICATIONS  (STANDING):  albuterol/ipratropium for Nebulization 3 milliLiter(s) Nebulizer every 6 hours  amLODIPine   Tablet 5 milliGRAM(s) Oral daily  Biotene Dry Mouth Oral Rinse 15 milliLiter(s) Swish and Spit every 8 hours  chlorhexidine 2% Cloths 1 Application(s) Topical daily  clonazePAM  Tablet 2 milliGRAM(s) Oral every 8 hours  heparin   Injectable 5000 Unit(s) SubCutaneous every 12 hours  influenza   Vaccine 0.5 milliLiter(s) IntraMuscular once  levETIRAcetam  Solution 500 milliGRAM(s) Oral two times a day  pantoprazole   Suspension 40 milliGRAM(s) Oral every 12 hours  sodium chloride 0.9%. 1000 milliLiter(s) (100 mL/Hr) IV Continuous <Continuous>    MEDICATIONS  (PRN):  acetaminophen   Oral Liquid .. 650 milliGRAM(s) Oral every 6 hours PRN Temp greater or equal to 38C (100.4F), Mild Pain (1 - 3)  HYDROmorphone  Injectable 0.5 milliGRAM(s) IV Push every 4 hours PRN dysynchronny      LABS:                        6.3    18.71 )-----------( 277      ( 07 May 2025 05:30 )             19.3     05-07    145  |  114[H]  |  71[H]  ----------------------------<  118[H]  4.5   |  19[L]  |  1.23    Ca    11.3[H]      07 May 2025 05:30  Phos  3.8     05-07  Mg     2.50     05-07    TPro  7.9  /  Alb  3.1[L]  /  TBili  2.3[H]  /  DBili  x   /  AST  84[H]  /  ALT  78[H]  /  AlkPhos  629[H]  05-07      Urinalysis Basic - ( 07 May 2025 05:30 )    Color: x / Appearance: x / SG: x / pH: x  Gluc: 118 mg/dL / Ketone: x  / Bili: x / Urobili: x   Blood: x / Protein: x / Nitrite: x   Leuk Esterase: x / RBC: x / WBC x   Sq Epi: x / Non Sq Epi: x / Bacteria: x

## 2025-05-08 NOTE — PROGRESS NOTE ADULT - ASSESSMENT
45 YO M with PMHx of sickle cell disease with prior acute chest syndrome requiring transfusion and exchange in the past (last 12/2024), iron overload, gout, HTN, and RUE DVT in 12/2024 on eliquis who presented on 3/15 by his hematologist second to anemia and hypoxia, down to 5.3 from baseline 7-8, with concern for vaso-occlusive crisis. While on medicine, anemia improved post transfusion and continued on SCC pain control PCA. Patient noted with left renal mass during prior admission and s/p L ureteroscopy with biopsy and stent placement on 3/19. Course complicated with hypoglycemia on 3/20 with RRT x 2 and found with severe anemia to 3.4 with hemorrhagic shock second to left perirenal and subcapsular hemorrhage and ultimately PEA arrest. ROSC achieved and transferred to MICU. s/p IR emobilization and course complicated anoxic brain injury, myoclonic jerks, GIB, MATA, HAGMA, DVTs (R IJ and LUE brachial), dysphagia, cirrhosis, and prolonged vent time s/p trach on 4/4. Transferred to RCU on 4/18.     NEUROLOGY  # Anoxic brain injury   - s/p cardiac arrest on 3/20 with ROSC in 4 minutes   - CT HEAD post arrest with diffuse sulcal effacement with increased intracranial pressure, and few patchy foci of cortical blurring concern for HIE  - MRI brain post arrest with with diffuse global abnormal supratentorial cortical restricted diffusion consistent with global hypoxic injury   - Monitor for now    # Seizures vs myoclonic jerks   - Witness myoclonic jerking concerning for seizures with anoxic brain injury  - vEEG with abundant myoclonic seizures in the setting of a severe diffuse/multifocal cerebral dysfunction which can be seen in the setting of sedative effect or due to anoxia  - s/p valium 10 Q4H   - Continue on keppra   - Continue on klonopin 2mg TID   - Continue on dilaudid PRN     # Central fevers   - Persistent fever spikes noted with concern for central fevers   - Bromocriptine started on 4/23, however noted with rise in LFTs and now stopped on 4/27   - Monitor fever curve.     HEENT  # Tongue biting   - Noted with tongue trauma  - s/p bite block replacement by OMFS on 4/23  - s/p dental mouth guard scan on 5/2   - Mouth guard to be delivered on 5/16   - Dental will continue with mouth care and reposition bite block PRN    # Thrush   - On nystatin (4/28 - 5/4)   - Continue with mouth care     CARDIOLOGY  # Shock state likely hemorrhagic vs vasoplegia  - Acute renal bleed noted requiring multiple transfusions and pressors in ICU.   - TTE 3/22 with EF 63 with normal LVRVSF with PASP 49  - Weaned off pressors in ICU and complicated by hypertension.   - Continue on norvasc 5 QD   - Monitor BP     # Autonomic vs pain  - Mild HTN and tachypnea noted likely pain induced vs autonomia?   - Continue on dilaudid pushes PRN    PULMONARY  # Respiratory failure   - Presented with SCC and hypoxia likely from anemia with no signs of acute chest, however noted with PEA with hemorrhagic shock post renal bx requiring intubation.   - Prolonged intubation and s/p 7 PORTEX trach on 4/4   - Continue on vent 20/460/6/30 with nebs and chest PT  - Continue on dilaudid PRN for dyssynchrony     GI  # Shock liver vs cirrhosis with sickle cell   - Transaminitis noted in ICU and thought to be second to shock liver likely second to hemorrhagic shock vs cardiac arrest, however LFTs remains elevated with acute on chronic hyperbilirubinemia.   - US ABD suggestive of early cirrhotic changes, cholecystectomy, and CBD 9mm   - CT AP with cirrhosis   - LFTs rising with bromocriptine   - Holding further bromocriptine   - Trend LFTs and bili     # GIB  - Anemia with GIB noted in ICU   - s/p EGD 4/11 with cauterization of gastric ulcer & hemostatic spraying of three duodenal ulcers. Gastric ulcer likely due to NGT trauma.   - Continue on PPI BID   - Monitor stools and HH     # Oropharyngeal dysphagia   - GI unable to place PEG due to hepatomegaly.   - IR unable to place PEG second to no safe window   - s/p open G TUBE placement on 4/18   - Continue on TF    RENAL  # Acute renal failure and HAGMA second to shock state   - Scr on admission was 1.25 (3/15/25) and increased to 6s while in ICU and s/p HD 3/24-3/29 and then again on 4/4.   - L SHILEY removed on 4/15   - Continue on HCO3 1300 TID tabs with improved acidosis, however hypernatremic and stopped.   - Monitor renal function, acidosis, and UOP     # L renal mass bx c/b hematoma   - Renal bx as below on 3/19  - Hemorrhagic shock and PEA arrest on 3/20   - Renal US with large L renal subcapsular hematoma.    - IR left renal angiogram and embolization on 3/20   - CT 4/6 with unchanged large left subcapsular and perinephric hematoma.  - CT A/P (4/20) showing stable subcapsular renal hematoma and NO new active bleeds.  - Monitor for now    # Urinary retention   - Passed TOV on 4/23   - Urinary retention again and likely neurogenic bladder   - Dave in place and hold further TOVs    # Hypernatremia  - s/p D5W   - Continue on  Q6H however sodium worsening   - Continue on FWF with no improvement.   - Continued on D5 with improvement   - Continue on  Q6H and monitor sodium     # Hypercalcemia likely from dehydration   - Rehydrate and monitor Ca     # Hyperphosphatemia   - Phos elevation likely second to renal failure   - PTH elevated, however Phos slowly down trending   - Monitor phos    INFECTIOUS DISEASE   # Central fevers  - Recurrent fevers and complete LVQ empirically as below   - Started on bromocriptine with improvement in fever curve, however noted with transaminitis and bromocriptine stopped with return of elevated temperature intermittently.   - Patient with known R IJ DVT and L kidney hematoma and now new LLE DVT that can cause intermittent fevers.   - Hold work up for now   - Hold further ABX for now   - Monitor fever curve.     # Questionable UTI  - Recurrent fevers with POSITIVE UA and s/p LVQ (4/23 - 4/28)   - Monitor off ABX     # VAP   - Post arrest noted with concern for aspiration and VAP in ICU   - Flu, SCx, BAL, BCx and UCx negative   - MRSA PCR with MSSA and completed bactroban in ICU   - Completed aztreonam (3/22-3/27) then casey (3/27-3/31) and vanco by dose in ICU   - CXR in ICU with RUL consolidation and completed recurrent casey course in ICU (4/2-4/7).     HEMATOLOGIC  # Anemia with SCC vs L kidney bleed vs GIB   - Baseline hemoglobin outpatient ~6.0-7.0 and sent in for anemia down to 5.3 without signs of bleeding and more likely SCC.    - Transfusion given on admission and improved back to baseline.   - Course complicated by hemorrhagic shock from left renal hematoma and HH down to 3.4.  - s/p 10U PRBC total while in ICU   - s/p PRBC pre-GTUBE with HH increased to 8.0  - c/b rapid decline in HH post GTUBE likely normalizing to baseline 6-7 as RPT CT AP post G TUBE with new bleeds and no acute signs of sickling.   - s/p PRBC 4/22   - Monitor HH     # SCD   - Discontinued deferasirox due to current renal failure   - Monitor Sickle labs QD (CBC with Diff, Retic, BMP, Hepatic Function, LDH and Hapto, and VBG).     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   - Discussed with IR recommend holding IVC filter for now   - Continue on DVT PPX HSQ   - LUE and LLE precautions     # LUE arm infiltration   - HCO3 GTT infiltration in ICU   - Seen by hand sx and no compartment syndrome concern   - Monitor for now     # RUE blood infiltration   - RUE blood transfusion infiltration noted on 4/17 with area of ecchymosis   - RPT DOPPLER with known R IJ DVT, however no upper arm thrombus or issues in area of infiltration  - Monitor site for now    ENDOCRINE   # Glycemic control   - Monitor BG   - No hx of DM2     SKIN   - Wound care reccs appreciated    ETHICS   - FULL CODE     DISPO - vent facility when able

## 2025-05-08 NOTE — PROGRESS NOTE ADULT - NUTRITIONAL ASSESSMENT
This patient has been assessed with a concern for Malnutrition and has been determined to have a diagnosis/diagnoses of Severe protein-calorie malnutrition.    This patient is being managed with:   Diet NPO with Tube Feed-  Tube Feeding Modality: Gastrostomy  Nepro with Carb Steady (NEPRORTH)  Total Volume for 24 Hours (mL): 1170  Bolus  Total Volume of Bolus (mL):  195  Total # of Feeds: 6  Tube Feed Frequency: Every 4 hours   Tube Feed Start Time: 05:00  Bolus Feed Rate (mL per Hour): 195   Bolus Feed Duration (in Hours): 1  Entered: May  7 2025  7:13PM

## 2025-05-08 NOTE — PROGRESS NOTE ADULT - NS ATTEND AMEND GEN_ALL_CORE FT
Pt is a 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 transfusion dependence c/b iron overload with cirrhosis, HTN, and RUE DVT (12/2024) on NOAC presenting to [facility] on 3/15/25 from hematology office for vaso-occlusive sickle cell crisis with acute chest syndrome and hypoxemic respiratory failure with hospital course c/b left renal mass s/p left ureteroscopy with biopsy/stent placement (3/19) c/b acute on chronic anemia 2/2 hemorrhagic shock from left perirenal-subcapsular hemorrhage c/b PEA arrest with ROSC (3/20, unclear downtime) s/p emergent IR embolization but c/b severe post-cardiac arrest hypoxic-ischemic encephalopathy with failure to wean from ventilator s/p tracheostomy placement (4/4/25) and RCU transfer 4/18 for further medical management.     Pt with concern for hypoxic-ischemic encephalopathy post-cardiac arrest. Pt with some brainstem reflexes but otherwise remains unresponsive with no purposeful consciousness and functional quadriplegia with full dependence on ADLs. Pt with previous episodes of myoclonus. CT and MRI imaging with diffuse cortical injury. EEG with GPD vs. myoclonus, but also with severe diffuse/multifocal cerebral dysfunction. Neurology consulted, recs appreciated. Concern for poor neurologic prognosis with likely persistent vegetative state vs. minimal consciousness. Discussed with pt's family along with palliative and neurology team. Pt continues on Keppra + clonazepam + prn Dilaudid, myoclonus improved. Central fevers resolved, will monitor off bromocriptine 2/2 concern for transaminitis.     Pt with acute combined hypoxemic and hypercapnic respiratory failure with failure to wean from ventilator s/p tracheostomy placement (#7 Portex 4/4).  Will continue to wean vent as tolerated. Airway clearance therapy in place. Wean O2 supplementation for goal O2 saturation 90-95%. Aspiration precautions and oral hygiene in place. Trach care and suctioning as per RCU routine. Hospital course c/b tongue biting with evidence of tongue trauma now s/p bite block placement by OMFS followed by dental mouthguard mold creation (5/2) to arrive by 5/16.    Pt with sickle cell dz with hx vaso-occlusive crises and intermittent transfusion dependence previously on deferasirox and Voxelotor, now off all maintenance therapy. Hemolysis labs grossly stable. Free water in place. Superimposed hemorrhagic shock now resolved, last pRBC ~4/22. Discussed with hematology, would not restart medications at this time. Hospital course further c/b RIJ, left brachial, and LLE gastrocnemius DVT i/s/o chronic VTEs previously on NOAC. Pt has had multiple life-threatening bleeding while in hospital on A/C, will hold for now. Tolerating DVT ppx. Repeat serial duplex 5/5 to check for propagation. Discussed with IR team, not a candidate for IVCF at this time.     Pt with oropharyngeal dysphagia s/p PEG placement now tolerating feeds at goal rate. Bowel regimen in place prn. PPI ppx. No renal or endocrine issues.     Pt further with newly diagnosed left renal mass non-invasive urothelial carcinoma. As per hematology, given overall co-morbidities and multiorgan failure, pt is currently not a candidate for DMT.    Pt medically optimized for hospital discharge to LTCF with ventilator capabilities. Pt full code. DVT ppx in place. Palliative consulted, recs appreciated. Will continue to update family and discuss plan of care throughout hospitalization.

## 2025-05-09 LAB
ALBUMIN SERPL ELPH-MCNC: 3.1 G/DL — LOW (ref 3.3–5)
ALP SERPL-CCNC: 631 U/L — HIGH (ref 40–120)
ALT FLD-CCNC: 74 U/L — HIGH (ref 4–41)
ANION GAP SERPL CALC-SCNC: 10 MMOL/L — SIGNIFICANT CHANGE UP (ref 7–14)
AST SERPL-CCNC: 86 U/L — HIGH (ref 4–40)
BASOPHILS # BLD AUTO: 0.07 K/UL — SIGNIFICANT CHANGE UP (ref 0–0.2)
BASOPHILS NFR BLD AUTO: 0.4 % — SIGNIFICANT CHANGE UP (ref 0–2)
BILIRUB SERPL-MCNC: 2.4 MG/DL — HIGH (ref 0.2–1.2)
BLD GP AB SCN SERPL QL: NEGATIVE — SIGNIFICANT CHANGE UP
BUN SERPL-MCNC: 61 MG/DL — HIGH (ref 7–23)
CALCIUM SERPL-MCNC: 11.5 MG/DL — HIGH (ref 8.4–10.5)
CHLORIDE SERPL-SCNC: 114 MMOL/L — HIGH (ref 98–107)
CO2 SERPL-SCNC: 18 MMOL/L — LOW (ref 22–31)
CREAT SERPL-MCNC: 1.02 MG/DL — SIGNIFICANT CHANGE UP (ref 0.5–1.3)
EGFR: 92 ML/MIN/1.73M2 — SIGNIFICANT CHANGE UP
EGFR: 92 ML/MIN/1.73M2 — SIGNIFICANT CHANGE UP
EOSINOPHIL # BLD AUTO: 0.52 K/UL — HIGH (ref 0–0.5)
EOSINOPHIL NFR BLD AUTO: 3.1 % — SIGNIFICANT CHANGE UP (ref 0–6)
GLUCOSE BLDC GLUCOMTR-MCNC: 115 MG/DL — HIGH (ref 70–99)
GLUCOSE SERPL-MCNC: 138 MG/DL — HIGH (ref 70–99)
HAPTOGLOB SERPL-MCNC: 52 MG/DL — SIGNIFICANT CHANGE UP (ref 34–200)
HCT VFR BLD CALC: 21 % — CRITICAL LOW (ref 39–50)
HGB BLD-MCNC: 6.9 G/DL — CRITICAL LOW (ref 13–17)
IANC: 13.19 K/UL — HIGH (ref 1.8–7.4)
IMM GRANULOCYTES NFR BLD AUTO: 0.7 % — SIGNIFICANT CHANGE UP (ref 0–0.9)
LDH SERPL L TO P-CCNC: 206 U/L — SIGNIFICANT CHANGE UP (ref 135–225)
LYMPHOCYTES # BLD AUTO: 1.96 K/UL — SIGNIFICANT CHANGE UP (ref 1–3.3)
LYMPHOCYTES # BLD AUTO: 11.6 % — LOW (ref 13–44)
MAGNESIUM SERPL-MCNC: 2.3 MG/DL — SIGNIFICANT CHANGE UP (ref 1.6–2.6)
MCHC RBC-ENTMCNC: 30.9 PG — SIGNIFICANT CHANGE UP (ref 27–34)
MCHC RBC-ENTMCNC: 32.9 G/DL — SIGNIFICANT CHANGE UP (ref 32–36)
MCV RBC AUTO: 94.2 FL — SIGNIFICANT CHANGE UP (ref 80–100)
MONOCYTES # BLD AUTO: 0.99 K/UL — HIGH (ref 0–0.9)
MONOCYTES NFR BLD AUTO: 5.9 % — SIGNIFICANT CHANGE UP (ref 2–14)
NEUTROPHILS # BLD AUTO: 13.19 K/UL — HIGH (ref 1.8–7.4)
NEUTROPHILS NFR BLD AUTO: 78.3 % — HIGH (ref 43–77)
NRBC # BLD AUTO: 0 K/UL — SIGNIFICANT CHANGE UP (ref 0–0)
NRBC # FLD: 0 K/UL — SIGNIFICANT CHANGE UP (ref 0–0)
NRBC BLD AUTO-RTO: 0 /100 WBCS — SIGNIFICANT CHANGE UP (ref 0–0)
PHOSPHATE SERPL-MCNC: 3.3 MG/DL — SIGNIFICANT CHANGE UP (ref 2.5–4.5)
PLATELET # BLD AUTO: 256 K/UL — SIGNIFICANT CHANGE UP (ref 150–400)
POTASSIUM SERPL-MCNC: 4 MMOL/L — SIGNIFICANT CHANGE UP (ref 3.5–5.3)
POTASSIUM SERPL-SCNC: 4 MMOL/L — SIGNIFICANT CHANGE UP (ref 3.5–5.3)
PROT SERPL-MCNC: 8 G/DL — SIGNIFICANT CHANGE UP (ref 6–8.3)
RBC # BLD: 2.23 M/UL — LOW (ref 4.2–5.8)
RBC # BLD: 2.23 M/UL — LOW (ref 4.2–5.8)
RBC # FLD: 18 % — HIGH (ref 10.3–14.5)
RETICS #: 138.5 K/UL — HIGH (ref 25–125)
RETICS/RBC NFR: 6.2 % — HIGH (ref 0.5–2.5)
RH IG SCN BLD-IMP: POSITIVE — SIGNIFICANT CHANGE UP
SODIUM SERPL-SCNC: 142 MMOL/L — SIGNIFICANT CHANGE UP (ref 135–145)
WBC # BLD: 16.84 K/UL — HIGH (ref 3.8–10.5)
WBC # FLD AUTO: 16.84 K/UL — HIGH (ref 3.8–10.5)

## 2025-05-09 PROCEDURE — 99233 SBSQ HOSP IP/OBS HIGH 50: CPT

## 2025-05-09 RX ADMIN — IPRATROPIUM BROMIDE AND ALBUTEROL SULFATE 3 MILLILITER(S): .5; 2.5 SOLUTION RESPIRATORY (INHALATION) at 03:43

## 2025-05-09 RX ADMIN — LEVETIRACETAM 500 MILLIGRAM(S): 10 INJECTION, SOLUTION INTRAVENOUS at 05:55

## 2025-05-09 RX ADMIN — Medication 15 MILLILITER(S): at 05:55

## 2025-05-09 RX ADMIN — Medication 40 MILLIGRAM(S): at 19:33

## 2025-05-09 RX ADMIN — Medication 650 MILLIGRAM(S): at 21:35

## 2025-05-09 RX ADMIN — CLONAZEPAM 2 MILLIGRAM(S): 0.5 TABLET ORAL at 21:25

## 2025-05-09 RX ADMIN — Medication 15 MILLILITER(S): at 13:58

## 2025-05-09 RX ADMIN — AMLODIPINE BESYLATE 5 MILLIGRAM(S): 10 TABLET ORAL at 05:55

## 2025-05-09 RX ADMIN — LEVETIRACETAM 500 MILLIGRAM(S): 10 INJECTION, SOLUTION INTRAVENOUS at 17:28

## 2025-05-09 RX ADMIN — IPRATROPIUM BROMIDE AND ALBUTEROL SULFATE 3 MILLILITER(S): .5; 2.5 SOLUTION RESPIRATORY (INHALATION) at 08:31

## 2025-05-09 RX ADMIN — CLONAZEPAM 2 MILLIGRAM(S): 0.5 TABLET ORAL at 13:57

## 2025-05-09 RX ADMIN — HEPARIN SODIUM 5000 UNIT(S): 1000 INJECTION INTRAVENOUS; SUBCUTANEOUS at 05:56

## 2025-05-09 RX ADMIN — Medication 1 APPLICATION(S): at 11:09

## 2025-05-09 RX ADMIN — Medication 100 MILLILITER(S): at 21:25

## 2025-05-09 RX ADMIN — Medication 40 MILLIGRAM(S): at 08:47

## 2025-05-09 RX ADMIN — IPRATROPIUM BROMIDE AND ALBUTEROL SULFATE 3 MILLILITER(S): .5; 2.5 SOLUTION RESPIRATORY (INHALATION) at 15:26

## 2025-05-09 RX ADMIN — HEPARIN SODIUM 5000 UNIT(S): 1000 INJECTION INTRAVENOUS; SUBCUTANEOUS at 17:31

## 2025-05-09 RX ADMIN — CLONAZEPAM 2 MILLIGRAM(S): 0.5 TABLET ORAL at 05:54

## 2025-05-09 RX ADMIN — IPRATROPIUM BROMIDE AND ALBUTEROL SULFATE 3 MILLILITER(S): .5; 2.5 SOLUTION RESPIRATORY (INHALATION) at 21:11

## 2025-05-09 RX ADMIN — Medication 100 MILLILITER(S): at 11:08

## 2025-05-09 RX ADMIN — Medication 15 MILLILITER(S): at 21:25

## 2025-05-09 NOTE — PROGRESS NOTE ADULT - NS ATTEND AMEND GEN_ALL_CORE FT
Pt is a 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 transfusion dependence c/b iron overload with cirrhosis, HTN, and RUE DVT (12/2024) on NOAC presenting to [facility] on 3/15/25 from hematology office for vaso-occlusive sickle cell crisis with acute chest syndrome and hypoxemic respiratory failure with hospital course c/b left renal mass s/p left ureteroscopy with biopsy/stent placement (3/19) c/b acute on chronic anemia 2/2 hemorrhagic shock from left perirenal-subcapsular hemorrhage c/b PEA arrest with ROSC (3/20, unclear downtime) s/p emergent IR embolization but c/b severe post-cardiac arrest hypoxic-ischemic encephalopathy with failure to wean from ventilator s/p tracheostomy placement (4/4/25) and RCU transfer 4/18 for further medical management.     Pt with concern for hypoxic-ischemic encephalopathy post-cardiac arrest. Pt with some brainstem reflexes but otherwise remains unresponsive with no purposeful consciousness and functional quadriplegia with full dependence on ADLs. Pt with previous episodes of myoclonus. CT and MRI imaging with diffuse cortical injury. EEG with GPD vs. myoclonus, but also with severe diffuse/multifocal cerebral dysfunction. Neurology consulted, recs appreciated. Concern for poor neurologic prognosis with likely persistent vegetative state vs. minimal consciousness. Discussed with pt's family along with palliative and neurology team. Pt continues on Keppra + clonazepam + prn Dilaudid, myoclonus improved. Central fevers resolved, will monitor off bromocriptine 2/2 concern for transaminitis.     Pt with acute combined hypoxemic and hypercapnic respiratory failure with failure to wean from ventilator s/p tracheostomy placement (#7 Portex 4/4).  Will continue to wean vent as tolerated. Airway clearance therapy in place. Wean O2 supplementation for goal O2 saturation 90-95%. Aspiration precautions and oral hygiene in place. Trach care and suctioning as per RCU routine. Hospital course c/b tongue biting with evidence of tongue trauma now s/p bite block placement by OMFS followed by dental mouthguard mold creation (5/2) to arrive by 5/16.    Pt with sickle cell dz with hx vaso-occlusive crises and intermittent transfusion dependence previously on deferasirox and Voxelotor, now off all maintenance therapy. Hemolysis labs grossly stable. Free water in place. Superimposed hemorrhagic shock now resolved, last pRBC ~4/22. Discussed with hematology, would not restart medications at this time. Hospital course further c/b RIJ, left brachial, and LLE gastrocnemius DVT i/s/o chronic VTEs previously on NOAC. Pt has had multiple life-threatening bleeding while in hospital on A/C, will hold for now. Tolerating DVT ppx. Repeat serial duplex 5/5 to check for propagation (-). Discussed with IR team, not a candidate for IVCF at this time.     Pt with oropharyngeal dysphagia s/p PEG placement now tolerating feeds at goal rate. Bowel regimen in place prn. PPI ppx. No renal or endocrine issues.     Pt further with newly diagnosed left renal mass non-invasive urothelial carcinoma. As per hematology, given overall co-morbidities and multiorgan failure, pt is currently not a candidate for DMT.    Pt medically optimized for hospital discharge to [facility]TCF with ventilator capabilities. Pt full code. DVT ppx in place. Palliative consulted, recs appreciated. Will continue to update family and discuss plan of care throughout hospitalization.

## 2025-05-09 NOTE — CHART NOTE - NSCHARTNOTEFT_GEN_A_CORE
This is a 44 year old male initially admitted with sickle cell crisis, now with newly diagnosed urothelial carcinoma. He is planned for discharge to a [facility] facility.   Pt is not a candidate for systemic anti-neoplastic treatment at this time. No plan for treatment after discharge.     Liss hCan PA-C  Hematology/Oncology  [facility]  [phone] (office)  [phone] (alt office)

## 2025-05-09 NOTE — PROGRESS NOTE ADULT - ASSESSMENT
45 YO M with PMHx of sickle cell disease with prior acute chest syndrome requiring transfusion and exchange in the past (last 12/2024), iron overload, gout, HTN, and RUE DVT in 12/2024 on eliquis who presented on 3/15 by his hematologist second to anemia and hypoxia, down to 5.3 from baseline 7-8, with concern for vaso-occlusive crisis. While on medicine, anemia improved post transfusion and continued on SCC pain control PCA. Patient noted with left renal mass during prior admission and s/p L ureteroscopy with biopsy and stent placement on 3/19. Course complicated with hypoglycemia on 3/20 with RRT x 2 and found with severe anemia to 3.4 with hemorrhagic shock second to left perirenal and subcapsular hemorrhage and ultimately PEA arrest. ROSC achieved and transferred to MICU. s/p IR emobilization and course complicated anoxic brain injury, myoclonic jerks, GIB, MATA, HAGMA, DVTs (R IJ and LUE brachial), dysphagia, cirrhosis, and prolonged vent time s/p trach on 4/4. Transferred to RCU on 4/18.     NEUROLOGY  # Anoxic brain injury   - s/p cardiac arrest on 3/20 with ROSC in 4 minutes   - CT HEAD post arrest with diffuse sulcal effacement with increased intracranial pressure, and few patchy foci of cortical blurring concern for HIE  - MRI brain post arrest with with diffuse global abnormal supratentorial cortical restricted diffusion consistent with global hypoxic injury   - Monitor for now    # Seizures vs myoclonic jerks   - Witness myoclonic jerking concerning for seizures with anoxic brain injury  - vEEG with abundant myoclonic seizures in the setting of a severe diffuse/multifocal cerebral dysfunction which can be seen in the setting of sedative effect or due to anoxia  - s/p valium 10 Q4H   - Continue on keppra   - Continue on klonopin 2mg TID   - Continue on dilaudid PRN     # Central fevers   - Persistent fever spikes noted with concern for central fevers   - Bromocriptine started on 4/23, however noted with rise in LFTs and now stopped on 4/27   - Monitor fever curve.     HEENT  # Tongue biting   - Noted with tongue trauma  - s/p bite block replacement by OMFS on 4/23  - s/p dental mouth guard scan on 5/2   - Mouth guard to be delivered on 5/16   - Dental will continue with mouth care and reposition bite block PRN    # Thrush   - On nystatin (4/28 - 5/4)   - Continue with mouth care     CARDIOLOGY  # Shock state likely hemorrhagic vs vasoplegia  - Acute renal bleed noted requiring multiple transfusions and pressors in ICU.   - TTE 3/22 with EF 63 with normal LVRVSF with PASP 49  - Weaned off pressors in ICU and complicated by hypertension.   - Continue on norvasc 5 QD   - Monitor BP     # Autonomic vs pain  - Mild HTN and tachypnea noted likely pain induced vs autonomia?   - Continue on dilaudid pushes PRN    PULMONARY  # Respiratory failure   - Presented with SCC and hypoxia likely from anemia with no signs of acute chest, however noted with PEA with hemorrhagic shock post renal bx requiring intubation.   - Prolonged intubation and s/p 7 PORTEX trach on 4/4   - Continue on vent 20/460/6/30 with nebs and chest PT  - Continue on dilaudid PRN for dyssynchrony     GI  # Shock liver vs cirrhosis with sickle cell   - Transaminitis noted in ICU and thought to be second to shock liver likely second to hemorrhagic shock vs cardiac arrest, however LFTs remains elevated with acute on chronic hyperbilirubinemia.   - US ABD suggestive of early cirrhotic changes, cholecystectomy, and CBD 9mm   - CT AP with cirrhosis   - LFTs rising with bromocriptine   - Holding further bromocriptine   - Trend LFTs and bili     # GIB  - Anemia with GIB noted in ICU   - s/p EGD 4/11 with cauterization of gastric ulcer & hemostatic spraying of three duodenal ulcers. Gastric ulcer likely due to NGT trauma.   - Continue on PPI BID   - Monitor stools and HH     # Oropharyngeal dysphagia   - GI unable to place PEG due to hepatomegaly.   - IR unable to place PEG second to no safe window   - s/p open G TUBE placement on 4/18   - Continue on TF    RENAL  # Acute renal failure and HAGMA second to shock state   - Scr on admission was 1.25 (3/15/25) and increased to 6s while in ICU and s/p HD 3/24-3/29 and then again on 4/4.   - L SHILEY removed on 4/15   - Continue on HCO3 1300 TID tabs with improved acidosis, however hypernatremic and stopped.   - Monitor renal function, acidosis, and UOP     # L renal mass bx c/b hematoma   - Renal bx as below on 3/19  - Hemorrhagic shock and PEA arrest on 3/20   - Renal US with large L renal subcapsular hematoma.    - IR left renal angiogram and embolization on 3/20   - CT 4/6 with unchanged large left subcapsular and perinephric hematoma.  - CT A/P (4/20) showing stable subcapsular renal hematoma and NO new active bleeds.  - Monitor for now    # Urinary retention   - Passed TOV on 4/23   - Urinary retention again and likely neurogenic bladder   - Dave in place and hold further TOVs    # Hypernatremia  - s/p D5W   - Continue on  Q6H however sodium worsening   - Continue on FWF with no improvement.   - Continued on D5 with improvement   - Continue on  Q6H and monitor sodium     # Hypercalcemia likely from dehydration   - Rehydrate and monitor Ca     # Hyperphosphatemia   - Phos elevation likely second to renal failure   - PTH elevated, however Phos slowly down trending   - Monitor phos    INFECTIOUS DISEASE   # Central fevers  - Recurrent fevers and complete LVQ empirically as below   - Started on bromocriptine with improvement in fever curve, however noted with transaminitis and bromocriptine stopped with return of elevated temperature intermittently.   - Patient with known R IJ DVT and L kidney hematoma and now new LLE DVT that can cause intermittent fevers.   - Hold work up for now   - Hold further ABX for now   - Monitor fever curve.     # Questionable UTI  - Recurrent fevers with POSITIVE UA and s/p LVQ (4/23 - 4/28)   - Monitor off ABX     # VAP   - Post arrest noted with concern for aspiration and VAP in ICU   - Flu, SCx, BAL, BCx and UCx negative   - MRSA PCR with MSSA and completed bactroban in ICU   - Completed aztreonam (3/22-3/27) then casey (3/27-3/31) and vanco by dose in ICU   - CXR in ICU with RUL consolidation and completed recurrent casey course in ICU (4/2-4/7).     HEMATOLOGIC  # Anemia with SCC vs L kidney bleed vs GIB   - Baseline hemoglobin outpatient ~6.0-7.0 and sent in for anemia down to 5.3 without signs of bleeding and more likely SCC.    - Transfusion given on admission and improved back to baseline.   - Course complicated by hemorrhagic shock from left renal hematoma and HH down to 3.4.  - s/p 10U PRBC total while in ICU   - s/p PRBC pre-GTUBE with HH increased to 8.0  - c/b rapid decline in HH post GTUBE likely normalizing to baseline 6-7 as RPT CT AP post G TUBE with new bleeds and no acute signs of sickling.   - s/p PRBC 4/22   - Monitor HH     # SCD   - Discontinued deferasirox due to current renal failure   - Monitor Sickle labs QD (CBC with Diff, Retic, BMP, Hepatic Function, LDH and Hapto, and VBG).     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   - Discussed with IR recommend holding IVC filter for now   - Continue on DVT PPX HSQ   - LUE and LLE precautions     # LUE arm infiltration   - HCO3 GTT infiltration in ICU   - Seen by hand sx and no compartment syndrome concern   - Monitor for now     # RUE blood infiltration   - RUE blood transfusion infiltration noted on 4/17 with area of ecchymosis   - RPT DOPPLER with known R IJ DVT, however no upper arm thrombus or issues in area of infiltration  - Monitor site for now    ENDOCRINE   # Glycemic control   - Monitor BG   - No hx of DM2     SKIN   - Wound care reccs appreciated    ETHICS   - FULL CODE     DISPO - vent facility when able 45 YO M with PMHx of sickle cell disease with prior acute chest syndrome requiring transfusion and exchange in the past (last 12/2024), iron overload, gout, HTN, and RUE DVT in 12/2024 on eliquis who presented on 3/15 by his hematologist second to anemia and hypoxia, down to 5.3 from baseline 7-8, with concern for vaso-occlusive crisis. While on medicine, anemia improved post transfusion and continued on SCC pain control PCA. Patient noted with left renal mass during prior admission and s/p L ureteroscopy with biopsy and stent placement on 3/19. Course complicated with hypoglycemia on 3/20 with RRT x 2 and found with severe anemia to 3.4 with hemorrhagic shock second to left perirenal and subcapsular hemorrhage and ultimately PEA arrest. ROSC achieved and transferred to MICU. s/p IR emobilization and course complicated anoxic brain injury, myoclonic jerks, GIB, MATA, HAGMA, DVTs (R IJ and LUE brachial), dysphagia, cirrhosis, and prolonged vent time s/p trach on 4/4. Transferred to RCU on 4/18.     NEUROLOGY  # Anoxic brain injury   - s/p cardiac arrest on 3/20 with ROSC in 4 minutes   - CT HEAD post arrest with diffuse sulcal effacement with increased intracranial pressure, and few patchy foci of cortical blurring concern for HIE  - MRI brain post arrest with with diffuse global abnormal supratentorial cortical restricted diffusion consistent with global hypoxic injury   - Monitor for now    # Seizures vs myoclonic jerks   - Witness myoclonic jerking concerning for seizures with anoxic brain injury  - vEEG with abundant myoclonic seizures in the setting of a severe diffuse/multifocal cerebral dysfunction which can be seen in the setting of sedative effect or due to anoxia  - s/p valium 10 Q4H   - Continue on keppra   - Continue on klonopin 2mg TID   - Continue on dilaudid PRN     # Central fevers   - Persistent fever spikes noted with concern for central fevers   - Bromocriptine started on 4/23, however noted with rise in LFTs and now stopped on 4/27   - Monitor fever curve.     HEENT  # Tongue biting   - Noted with tongue trauma  - s/p bite block replacement by OMFS on 4/23  - s/p dental mouth guard scan on 5/2   - Mouth guard to be delivered on 5/16   - Dental will continue with mouth care and reposition bite block PRN    # Thrush   - On nystatin (4/28 - 5/4)   - Continue with mouth care     CARDIOLOGY  # Shock state likely hemorrhagic vs vasoplegia  - Acute renal bleed noted requiring multiple transfusions and pressors in ICU.   - TTE 3/22 with EF 63 with normal LVRVSF with PASP 49  - Weaned off pressors in ICU and complicated by hypertension.   - Continue on norvasc 5 QD   - Monitor BP     # Autonomic vs pain  - Mild HTN and tachypnea noted likely pain induced vs autonomia?   - Continue on dilaudid pushes PRN    PULMONARY  # Respiratory failure   - Presented with SCC and hypoxia likely from anemia with no signs of acute chest, however noted with PEA with hemorrhagic shock post renal bx requiring intubation.   - Prolonged intubation and s/p 7 PORTEX trach on 4/4   - Continue on vent 20/460/6/30 with nebs and chest PT  - Continue on dilaudid PRN for dyssynchrony     GI  # Shock liver vs cirrhosis with sickle cell   - Transaminitis noted in ICU and thought to be second to shock liver likely second to hemorrhagic shock vs cardiac arrest, however LFTs remains elevated with acute on chronic hyperbilirubinemia.   - US ABD suggestive of early cirrhotic changes, cholecystectomy, and CBD 9mm   - CT AP with cirrhosis   - LFTs rising with bromocriptine   - Holding further bromocriptine   - Trend LFTs and bili     # GIB  - Anemia with GIB noted in ICU   - s/p EGD 4/11 with cauterization of gastric ulcer & hemostatic spraying of three duodenal ulcers. Gastric ulcer likely due to NGT trauma.   - Continue on PPI BID   - Monitor stools and HH     # Oropharyngeal dysphagia   - GI unable to place PEG due to hepatomegaly.   - IR unable to place PEG second to no safe window   - s/p open G TUBE placement on 4/18   - Continue on TF    RENAL  # Acute renal failure and HAGMA second to shock state   - Scr on admission was 1.25 (3/15/25) and increased to 6s while in ICU and s/p HD 3/24-3/29 and then again on 4/4.   - L SHILEY removed on 4/15   - Continue on HCO3 1300 TID tabs with improved acidosis, however hypernatremic and stopped.   - Monitor renal function, acidosis, and UOP     # L renal mass bx c/b hematoma   - Renal bx as below on 3/19  - Hemorrhagic shock and PEA arrest on 3/20   - Renal US with large L renal subcapsular hematoma.    - IR left renal angiogram and embolization on 3/20   - CT 4/6 with unchanged large left subcapsular and perinephric hematoma.  - CT A/P (4/20) showing stable subcapsular renal hematoma and NO new active bleeds.  - Monitor for now    # Urinary retention   - Passed TOV on 4/23   - Urinary retention again and likely neurogenic bladder   - Dave in place and hold further TOVs    # Hypernatremia  - s/p D5W   - Continue on  Q6H however sodium worsening   - Continue on FWF with no improvement.   - Continued on D5 with improvement   - Continue on  Q6H and monitor sodium     # Hypercalcemia likely from dehydration   - Rehydrate and monitor Ca   - IVF given (5/9) for CC calcium 12.2     # Hyperphosphatemia   - Phos elevation likely second to renal failure   - PTH elevated, however Phos slowly down trending   - Monitor phos    INFECTIOUS DISEASE   # Central fevers  - Recurrent fevers and complete LVQ empirically as below   - Started on bromocriptine with improvement in fever curve, however noted with transaminitis and bromocriptine stopped with return of elevated temperature intermittently.   - Patient with known R IJ DVT and L kidney hematoma and now new LLE DVT that can cause intermittent fevers.   - Hold work up for now   - Hold further ABX for now   - Monitor fever curve.     # Questionable UTI  - Recurrent fevers with POSITIVE UA and s/p LVQ (4/23 - 4/28)   - Monitor off ABX     # VAP   - Post arrest noted with concern for aspiration and VAP in ICU   - Flu, SCx, BAL, BCx and UCx negative   - MRSA PCR with MSSA and completed bactroban in ICU   - Completed aztreonam (3/22-3/27) then casey (3/27-3/31) and vanco by dose in ICU   - CXR in ICU with RUL consolidation and completed recurrent casey course in ICU (4/2-4/7).     HEMATOLOGIC  # Anemia with SCC vs L kidney bleed vs GIB   - Baseline hemoglobin outpatient ~6.0-7.0 and sent in for anemia down to 5.3 without signs of bleeding and more likely SCC.    - Transfusion given on admission and improved back to baseline.   - Course complicated by hemorrhagic shock from left renal hematoma and HH down to 3.4.  - s/p 10U PRBC total while in ICU   - s/p PRBC pre-GTUBE with HH increased to 8.0  - c/b rapid decline in HH post GTUBE likely normalizing to baseline 6-7 as RPT CT AP post G TUBE with new bleeds and no acute signs of sickling.   - s/p PRBC 4/22   - Monitor HH     # SCD   - Discontinued deferasirox due to current renal failure   - Monitor Sickle labs QD (CBC with Diff, Retic, BMP, Hepatic Function, LDH and Hapto, and VBG).     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   - Discussed with IR recommend holding IVC filter for now   - Continue on DVT PPX HSQ   - LUE and LLE precautions     # LUE arm infiltration   - HCO3 GTT infiltration in ICU   - Seen by hand sx and no compartment syndrome concern   - Monitor for now     # RUE blood infiltration   - RUE blood transfusion infiltration noted on 4/17 with area of ecchymosis   - RPT DOPPLER with known R IJ DVT, however no upper arm thrombus or issues in area of infiltration  - Monitor site for now    ENDOCRINE   # Glycemic control   - Monitor BG   - No hx of DM2     SKIN   - Wound care reccs appreciated    ETHICS   - FULL CODE     DISPO - vent facility when able

## 2025-05-09 NOTE — PROGRESS NOTE ADULT - SUBJECTIVE AND OBJECTIVE BOX
CHIEF COMPLAINT: Patient is a 44y old  Male who presents with a chief complaint of Referred by Hematologist for low Hg    INTERVAL EVENTS: No acute events overnight.    REVIEW OF SYSTEMS: Seen and evaluated by bedside during AM rounds.      Mode: AC/ CMV (Assist Control/ Continuous Mandatory Ventilation), RR (machine): 20, TV (machine): 450, FiO2: 30, PEEP: 6, ITime: 0.83, MAP: 10, PIP: 19      OBJECTIVE:  ICU Vital Signs Last 24 Hrs  T(C): 36.9 (09 May 2025 04:52), Max: 37.3 (08 May 2025 12:00)  T(F): 98.4 (09 May 2025 04:52), Max: 99.1 (08 May 2025 12:00)  HR: 114 (09 May 2025 04:52) (101 - 116)  BP: 145/81 (09 May 2025 04:52) (130/74 - 145/81)  BP(mean): 94 (08 May 2025 16:00) (94 - 94)  ABP: --  ABP(mean): --  RR: 20 (09 May 2025 04:52) (20 - 20)  SpO2: 100% (09 May 2025 04:52) (100% - 100%)    O2 Parameters below as of 09 May 2025 04:52  Patient On (Oxygen Delivery Method): ventilator    O2 Concentration (%): 30      Mode: AC/ CMV (Assist Control/ Continuous Mandatory Ventilation), RR (machine): 20, TV (machine): 450, FiO2: 30, PEEP: 6, ITime: 0.83, MAP: 10, PIP: 19     @ 07:08 @ 07:00  --------------------------------------------------------  IN: 0 mL / OUT: 1000 mL / NET: -1000 mL     @ 07:  -  09 @ 06:43  --------------------------------------------------------  IN: 1785 mL / OUT: 900 mL / NET: 885 mL      CAPILLARY BLOOD GLUCOSE      POCT Blood Glucose.: 143 mg/dL (08 May 2025 23:41)      HOSPITAL MEDICATIONS:  MEDICATIONS  (STANDING):  albuterol/ipratropium for Nebulization 3 milliLiter(s) Nebulizer every 6 hours  amLODIPine   Tablet 5 milliGRAM(s) Oral daily  Biotene Dry Mouth Oral Rinse 15 milliLiter(s) Swish and Spit every 8 hours  chlorhexidine 2% Cloths 1 Application(s) Topical daily  clonazePAM  Tablet 2 milliGRAM(s) Oral every 8 hours  heparin   Injectable 5000 Unit(s) SubCutaneous every 12 hours  influenza   Vaccine 0.5 milliLiter(s) IntraMuscular once  levETIRAcetam  Solution 500 milliGRAM(s) Oral two times a day  pantoprazole   Suspension 40 milliGRAM(s) Oral every 12 hours  sodium chloride 0.9%. 1000 milliLiter(s) (100 mL/Hr) IV Continuous <Continuous>    MEDICATIONS  (PRN):  acetaminophen   Oral Liquid .. 650 milliGRAM(s) Oral every 6 hours PRN Temp greater or equal to 38C (100.4F), Mild Pain (1 - 3)  HYDROmorphone  Injectable 0.5 milliGRAM(s) IV Push every 4 hours PRN dysynchronny      PHYSICAL EXAMINATION  General: Alert and cooperative. Resting comfortably. No apparent distress noted. Dry mucous membrane noted. White conjunctiva, no icterus.   HEENT: Normocephalic/atraumatic. EOMI. No discharge, conjunctivitis, or scleral icterus. No ptosis. No discharge or sinus tenderness noted. Mucous membranes are pink without bleeding. Tonsils are not enlarged. Good dental hygiene noted. No facial asymmetry. Neck is supple with a full range of motion. No masses or tenderness. Trachea is midline. Lymph nodes are not enlarged.   Cardiovascular: Normal rate and regular rhythm. No murmur/gallop.   Respiratory: Breath sounds clear and equal bilaterally without rales, wheezing, or rhonchi. No accessory muscles used.   Abdomen: Soft, nontender, nondistended. Bowel sounds are present. No bruit. No hepatosplenomegaly or masses. No rebound or guarding.   Skin: Warm to touch. No rashes, wounds, or skin lesions noted.   Extremities: +2 dorsalis pedis pulse is noted for the lower extremities. Full range of motion on all four extremities. 5/5 motor strength noted in all extremities. No clubbing, cyanosis, edema, or varicose veins.   MSK: No swelling or deformity. Posture, body symmetry, and movements are appropriate.   Neuro: AxO x 3, CN II - XII grossly intact.    LABS:                        7.0    16.84 )-----------( 251      ( 08 May 2025 05:22 )             20.9     05-    143  |  113[H]  |  64[H]  ----------------------------<  131[H]  4.5   |  18[L]  |  1.13    Ca    10.9[H]      08 May 2025 05:22  Phos  3.3     -  Mg     2.30     -    TPro  7.7  /  Alb  2.9[L]  /  TBili  2.4[H]  /  DBili  x   /  AST  89[H]  /  ALT  76[H]  /  AlkPhos  618[H]        Urinalysis Basic - ( 08 May 2025 05:22 )    Color: x / Appearance: x / SG: x / pH: x  Gluc: 131 mg/dL / Ketone: x  / Bili: x / Urobili: x   Blood: x / Protein: x / Nitrite: x   Leuk Esterase: x / RBC: x / WBC x   Sq Epi: x / Non Sq Epi: x / Bacteria: x            PAST MEDICAL & SURGICAL HISTORY:  Sickle cell anemia      Gout      Deep vein thrombosis (DVT)      Iron overload      Hypertension      History of cholecystectomy      History of removal of Port-a-Cath          FAMILY HISTORY:  Family history of sickle cell trait (Father, Mother)    Patient's father is  (Father)    Patient's mother is  (Mother)        Social History:  Lives alone (15 Mar 2025 09:54)      RADIOLOGY:  [ ] Reviewed and interpreted by me    PULMONARY FUNCTION TESTS:    EKG: CHIEF COMPLAINT: Patient is a 44y old  Male who presents with a chief complaint of Referred by Hematologist for low Hg    INTERVAL EVENTS: No acute events overnight.     REVIEW OF SYSTEMS: Seen and evaluated by bedside during AM rounds. Unable to assess ROS d/t poor mentation.     Mode: AC/ CMV (Assist Control/ Continuous Mandatory Ventilation), RR (machine): 20, TV (machine): 450, FiO2: 30, PEEP: 6, ITime: 0.83, MAP: 10, PIP: 19      OBJECTIVE:  ICU Vital Signs Last 24 Hrs  T(C): 36.9 (09 May 2025 04:52), Max: 37.3 (08 May 2025 12:00)  T(F): 98.4 (09 May 2025 04:52), Max: 99.1 (08 May 2025 12:00)  HR: 114 (09 May 2025 04:52) (101 - 116)  BP: 145/81 (09 May 2025 04:52) (130/74 - 145/81)  BP(mean): 94 (08 May 2025 16:00) (94 - 94)  ABP: --  ABP(mean): --  RR: 20 (09 May 2025 04:52) (20 - 20)  SpO2: 100% (09 May 2025 04:52) (100% - 100%)    O2 Parameters below as of 09 May 2025 04:52  Patient On (Oxygen Delivery Method): ventilator    O2 Concentration (%): 30      Mode: AC/ CMV (Assist Control/ Continuous Mandatory Ventilation), RR (machine): 20, TV (machine): 450, FiO2: 30, PEEP: 6, ITime: 0.83, MAP: 10, PIP: 19     @ 07:  -  08 @ 07:00  --------------------------------------------------------  IN: 0 mL / OUT: 1000 mL / NET: -1000 mL    08 @ 07:01  -  09 @ 06:43  --------------------------------------------------------  IN: 1785 mL / OUT: 900 mL / NET: 885 mL      CAPILLARY BLOOD GLUCOSE      POCT Blood Glucose.: 143 mg/dL (08 May 2025 23:41)      HOSPITAL MEDICATIONS:  MEDICATIONS  (STANDING):  albuterol/ipratropium for Nebulization 3 milliLiter(s) Nebulizer every 6 hours  amLODIPine   Tablet 5 milliGRAM(s) Oral daily  Biotene Dry Mouth Oral Rinse 15 milliLiter(s) Swish and Spit every 8 hours  chlorhexidine 2% Cloths 1 Application(s) Topical daily  clonazePAM  Tablet 2 milliGRAM(s) Oral every 8 hours  heparin   Injectable 5000 Unit(s) SubCutaneous every 12 hours  influenza   Vaccine 0.5 milliLiter(s) IntraMuscular once  levETIRAcetam  Solution 500 milliGRAM(s) Oral two times a day  pantoprazole   Suspension 40 milliGRAM(s) Oral every 12 hours  sodium chloride 0.9%. 1000 milliLiter(s) (100 mL/Hr) IV Continuous <Continuous>    MEDICATIONS  (PRN):  acetaminophen   Oral Liquid .. 650 milliGRAM(s) Oral every 6 hours PRN Temp greater or equal to 38C (100.4F), Mild Pain (1 - 3)  HYDROmorphone  Injectable 0.5 milliGRAM(s) IV Push every 4 hours PRN dysynchronny      PHYSICAL EXAMINATION  General: Resting comfortably. No apparent distress noted.    HEENT: + Bite block in mouth. Normocephalic/atraumatic.    Cardiovascular: Tachycardiac and regular rhythm.   Respiratory: + Trach to Vent (AC/VC 20/460/+6/30%). Breath sounds coarse diminished at bases without wheezing. No accessory muscles used.   Abdomen: + Central obesity. + PEG c/d/i. Soft, nontender, nondistended.   Skin: Warm to touch. No rashes, wounds, or skin lesions noted.   Extremities: Unable to assess ROM.    Neuro: Unable to assess. Not following commands. Nonverbal    LABS:                        7.0    16.84 )-----------( 251      ( 08 May 2025 05:22 )             20.9     05-08    143  |  113[H]  |  64[H]  ----------------------------<  131[H]  4.5   |  18[L]  |  1.13    Ca    10.9[H]      08 May 2025 05:22  Phos  3.3     -  Mg     2.30         TPro  7.7  /  Alb  2.9[L]  /  TBili  2.4[H]  /  DBili  x   /  AST  89[H]  /  ALT  76[H]  /  AlkPhos  618[H]        Urinalysis Basic - ( 08 May 2025 05:22 )    Color: x / Appearance: x / SG: x / pH: x  Gluc: 131 mg/dL / Ketone: x  / Bili: x / Urobili: x   Blood: x / Protein: x / Nitrite: x   Leuk Esterase: x / RBC: x / WBC x   Sq Epi: x / Non Sq Epi: x / Bacteria: x            PAST MEDICAL & SURGICAL HISTORY:  Sickle cell anemia      Gout      Deep vein thrombosis (DVT)      Iron overload      Hypertension      History of cholecystectomy      History of removal of Port-a-Cath          FAMILY HISTORY:  Family history of sickle cell trait (Father, Mother)    Patient's father is  (Father)    Patient's mother is  (Mother)        Social History:  Lives alone (15 Mar 2025 09:54)      RADIOLOGY:  [ ] Reviewed and interpreted by me    PULMONARY FUNCTION TESTS:    EKG:

## 2025-05-10 LAB
ALBUMIN SERPL ELPH-MCNC: 2.8 G/DL — LOW (ref 3.3–5)
ALP SERPL-CCNC: 586 U/L — HIGH (ref 40–120)
ALT FLD-CCNC: 72 U/L — HIGH (ref 4–41)
ANION GAP SERPL CALC-SCNC: 12 MMOL/L — SIGNIFICANT CHANGE UP (ref 7–14)
AST SERPL-CCNC: 81 U/L — HIGH (ref 4–40)
BILIRUB SERPL-MCNC: 2.2 MG/DL — HIGH (ref 0.2–1.2)
BUN SERPL-MCNC: 58 MG/DL — HIGH (ref 7–23)
CALCIUM SERPL-MCNC: 10.7 MG/DL — HIGH (ref 8.4–10.5)
CHLORIDE SERPL-SCNC: 114 MMOL/L — HIGH (ref 98–107)
CO2 SERPL-SCNC: 16 MMOL/L — LOW (ref 22–31)
CREAT SERPL-MCNC: 0.98 MG/DL — SIGNIFICANT CHANGE UP (ref 0.5–1.3)
EGFR: 97 ML/MIN/1.73M2 — SIGNIFICANT CHANGE UP
EGFR: 97 ML/MIN/1.73M2 — SIGNIFICANT CHANGE UP
GLUCOSE BLDC GLUCOMTR-MCNC: 135 MG/DL — HIGH (ref 70–99)
GLUCOSE BLDC GLUCOMTR-MCNC: 139 MG/DL — HIGH (ref 70–99)
GLUCOSE SERPL-MCNC: 126 MG/DL — HIGH (ref 70–99)
HAPTOGLOB SERPL-MCNC: 45 MG/DL — SIGNIFICANT CHANGE UP (ref 34–200)
LDH SERPL L TO P-CCNC: 286 U/L — HIGH (ref 135–225)
MAGNESIUM SERPL-MCNC: 2.1 MG/DL — SIGNIFICANT CHANGE UP (ref 1.6–2.6)
PHOSPHATE SERPL-MCNC: 2.9 MG/DL — SIGNIFICANT CHANGE UP (ref 2.5–4.5)
POTASSIUM SERPL-MCNC: 4.1 MMOL/L — SIGNIFICANT CHANGE UP (ref 3.5–5.3)
POTASSIUM SERPL-SCNC: 4.1 MMOL/L — SIGNIFICANT CHANGE UP (ref 3.5–5.3)
PROT SERPL-MCNC: 7.7 G/DL — SIGNIFICANT CHANGE UP (ref 6–8.3)
SODIUM SERPL-SCNC: 142 MMOL/L — SIGNIFICANT CHANGE UP (ref 135–145)

## 2025-05-10 PROCEDURE — 99233 SBSQ HOSP IP/OBS HIGH 50: CPT

## 2025-05-10 RX ADMIN — Medication 15 MILLILITER(S): at 13:08

## 2025-05-10 RX ADMIN — Medication 15 MILLILITER(S): at 06:03

## 2025-05-10 RX ADMIN — Medication 100 MILLILITER(S): at 23:06

## 2025-05-10 RX ADMIN — CLONAZEPAM 2 MILLIGRAM(S): 0.5 TABLET ORAL at 06:03

## 2025-05-10 RX ADMIN — Medication 40 MILLIGRAM(S): at 17:25

## 2025-05-10 RX ADMIN — IPRATROPIUM BROMIDE AND ALBUTEROL SULFATE 3 MILLILITER(S): .5; 2.5 SOLUTION RESPIRATORY (INHALATION) at 15:35

## 2025-05-10 RX ADMIN — LEVETIRACETAM 500 MILLIGRAM(S): 10 INJECTION, SOLUTION INTRAVENOUS at 06:03

## 2025-05-10 RX ADMIN — CLONAZEPAM 2 MILLIGRAM(S): 0.5 TABLET ORAL at 21:11

## 2025-05-10 RX ADMIN — IPRATROPIUM BROMIDE AND ALBUTEROL SULFATE 3 MILLILITER(S): .5; 2.5 SOLUTION RESPIRATORY (INHALATION) at 03:55

## 2025-05-10 RX ADMIN — Medication 1 APPLICATION(S): at 11:25

## 2025-05-10 RX ADMIN — Medication 650 MILLIGRAM(S): at 21:10

## 2025-05-10 RX ADMIN — HEPARIN SODIUM 5000 UNIT(S): 1000 INJECTION INTRAVENOUS; SUBCUTANEOUS at 17:25

## 2025-05-10 RX ADMIN — Medication 15 MILLILITER(S): at 21:21

## 2025-05-10 RX ADMIN — HEPARIN SODIUM 5000 UNIT(S): 1000 INJECTION INTRAVENOUS; SUBCUTANEOUS at 06:10

## 2025-05-10 RX ADMIN — Medication 650 MILLIGRAM(S): at 06:02

## 2025-05-10 RX ADMIN — IPRATROPIUM BROMIDE AND ALBUTEROL SULFATE 3 MILLILITER(S): .5; 2.5 SOLUTION RESPIRATORY (INHALATION) at 21:34

## 2025-05-10 RX ADMIN — Medication 40 MILLIGRAM(S): at 06:03

## 2025-05-10 RX ADMIN — CLONAZEPAM 2 MILLIGRAM(S): 0.5 TABLET ORAL at 13:08

## 2025-05-10 RX ADMIN — AMLODIPINE BESYLATE 5 MILLIGRAM(S): 10 TABLET ORAL at 06:03

## 2025-05-10 RX ADMIN — Medication 100 MILLILITER(S): at 13:07

## 2025-05-10 RX ADMIN — LEVETIRACETAM 500 MILLIGRAM(S): 10 INJECTION, SOLUTION INTRAVENOUS at 17:23

## 2025-05-10 RX ADMIN — IPRATROPIUM BROMIDE AND ALBUTEROL SULFATE 3 MILLILITER(S): .5; 2.5 SOLUTION RESPIRATORY (INHALATION) at 07:46

## 2025-05-10 NOTE — PROGRESS NOTE ADULT - SUBJECTIVE AND OBJECTIVE BOX
RCU PROGRESS NOTE     CHIEF COMPLAINT: Patient is a 44y old  Male who presents with a chief complaint of Referred by Hematologist for low Hg (09 May 2025 06:42)      INTERVAL EVENTS:    REVIEW OF SYSTEMS: Seen by bedside during AM rounds and     Mode: AC/ CMV (Assist Control/ Continuous Mandatory Ventilation), RR (machine): 20, TV (machine): 460, FiO2: 30, PEEP: 6, ITime: 0.86, MAP: 12, PIP: 25      OBJECTIVE:  ICU Vital Signs Last 24 Hrs  T(C): 36.9 (10 May 2025 04:00), Max: 37.4 (09 May 2025 20:00)  T(F): 98.5 (10 May 2025 04:00), Max: 99.4 (09 May 2025 20:00)  HR: 93 (10 May 2025 07:45) (93 - 121)  BP: 160/83 (10 May 2025 04:00) (120/74 - 160/83)  BP(mean): 89 (09 May 2025 12:00) (86 - 89)  ABP: --  ABP(mean): --  RR: 20 (10 May 2025 04:00) (20 - 20)  SpO2: 100% (10 May 2025 07:45) (100% - 100%)    O2 Parameters below as of 10 May 2025 07:45  Patient On (Oxygen Delivery Method): ventilator          Mode: AC/ CMV (Assist Control/ Continuous Mandatory Ventilation), RR (machine): 20, TV (machine): 460, FiO2: 30, PEEP: 6, ITime: 0.86, MAP: 12, PIP: 25    05-09 @ 07:01  -  05-10 @ 07:00  --------------------------------------------------------  IN: 2585 mL / OUT: 2000 mL / NET: 585 mL      CAPILLARY BLOOD GLUCOSE      POCT Blood Glucose.: 139 mg/dL (10 May 2025 04:25)      HOSPITAL MEDICATIONS:  MEDICATIONS  (STANDING):  albuterol/ipratropium for Nebulization 3 milliLiter(s) Nebulizer every 6 hours  amLODIPine   Tablet 5 milliGRAM(s) Oral daily  Biotene Dry Mouth Oral Rinse 15 milliLiter(s) Swish and Spit every 8 hours  chlorhexidine 2% Cloths 1 Application(s) Topical daily  clonazePAM  Tablet 2 milliGRAM(s) Oral every 8 hours  heparin   Injectable 5000 Unit(s) SubCutaneous every 12 hours  influenza   Vaccine 0.5 milliLiter(s) IntraMuscular once  levETIRAcetam  Solution 500 milliGRAM(s) Oral two times a day  pantoprazole   Suspension 40 milliGRAM(s) Oral every 12 hours  sodium chloride 0.9%. 1000 milliLiter(s) (100 mL/Hr) IV Continuous <Continuous>    MEDICATIONS  (PRN):  acetaminophen   Oral Liquid .. 650 milliGRAM(s) Oral every 6 hours PRN Temp greater or equal to 38C (100.4F), Mild Pain (1 - 3)  HYDROmorphone  Injectable 0.5 milliGRAM(s) IV Push every 4 hours PRN dysynchronny      PHYSICAL EXAMINATION    LABS:                        6.9    16.84 )-----------( 256      ( 09 May 2025 05:10 )             21.0     05-10    142  |  114[H]  |  58[H]  ----------------------------<  126[H]  4.1   |  16[L]  |  0.98    Ca    10.7[H]      10 May 2025 06:24  Phos  2.9     05-10  Mg     2.10     05-10    TPro  7.7  /  Alb  2.8[L]  /  TBili  2.2[H]  /  DBili  x   /  AST  81[H]  /  ALT  72[H]  /  AlkPhos  586[H]  05-10      Urinalysis Basic - ( 10 May 2025 06:24 )    Color: x / Appearance: x / SG: x / pH: x  Gluc: 126 mg/dL / Ketone: x  / Bili: x / Urobili: x   Blood: x / Protein: x / Nitrite: x   Leuk Esterase: x / RBC: x / WBC x   Sq Epi: x / Non Sq Epi: x / Bacteria: x            PAST MEDICAL & SURGICAL HISTORY:  Sickle cell anemia      Gout      Deep vein thrombosis (DVT)      Iron overload      Hypertension      History of cholecystectomy      History of removal of Port-a-Cath          FAMILY HISTORY:  Family history of sickle cell trait (Father, Mother)    Patient's father is  (Father)    Patient's mother is  (Mother)        Social History:  Lives alone (15 Mar 2025 09:54)      RADIOLOGY:  [ ] Reviewed and interpreted by me    PULMONARY FUNCTION TESTS:    EKG: RCU PROGRESS NOTE     CHIEF COMPLAINT: Patient is a 44y old  Male who presents with a chief complaint of Referred by Hematologist for low Hg (09 May 2025 06:42)      INTERVAL EVENTS:  - Calcium coming down post IVF and rehydrating again today     REVIEW OF SYSTEMS: Seen by bedside during AM rounds and unable to assess ROS as anoxic     Mode: AC/ CMV (Assist Control/ Continuous Mandatory Ventilation), RR (machine): 20, TV (machine): 460, FiO2: 30, PEEP: 6, ITime: 0.86, MAP: 12, PIP: 25      OBJECTIVE:  ICU Vital Signs Last 24 Hrs  T(C): 36.9 (10 May 2025 04:00), Max: 37.4 (09 May 2025 20:00)  T(F): 98.5 (10 May 2025 04:00), Max: 99.4 (09 May 2025 20:00)  HR: 93 (10 May 2025 07:45) (93 - 121)  BP: 160/83 (10 May 2025 04:00) (120/74 - 160/83)  BP(mean): 89 (09 May 2025 12:00) (86 - 89)  ABP: --  ABP(mean): --  RR: 20 (10 May 2025 04:00) (20 - 20)  SpO2: 100% (10 May 2025 07:45) (100% - 100%)    O2 Parameters below as of 10 May 2025 07:45  Patient On (Oxygen Delivery Method): ventilator          Mode: AC/ CMV (Assist Control/ Continuous Mandatory Ventilation), RR (machine): 20, TV (machine): 460, FiO2: 30, PEEP: 6, ITime: 0.86, MAP: 12, PIP: 25    05-09 @ 07:01  -  -10 @ 07:00  --------------------------------------------------------  IN: 2585 mL / OUT: 2000 mL / NET: 585 mL      CAPILLARY BLOOD GLUCOSE  POCT Blood Glucose.: 139 mg/dL (10 May 2025 04:25)      HOSPITAL MEDICATIONS:  MEDICATIONS  (STANDING):  albuterol/ipratropium for Nebulization 3 milliLiter(s) Nebulizer every 6 hours  amLODIPine   Tablet 5 milliGRAM(s) Oral daily  Biotene Dry Mouth Oral Rinse 15 milliLiter(s) Swish and Spit every 8 hours  chlorhexidine 2% Cloths 1 Application(s) Topical daily  clonazePAM  Tablet 2 milliGRAM(s) Oral every 8 hours  heparin   Injectable 5000 Unit(s) SubCutaneous every 12 hours  influenza   Vaccine 0.5 milliLiter(s) IntraMuscular once  levETIRAcetam  Solution 500 milliGRAM(s) Oral two times a day  pantoprazole   Suspension 40 milliGRAM(s) Oral every 12 hours  sodium chloride 0.9%. 1000 milliLiter(s) (100 mL/Hr) IV Continuous <Continuous>    MEDICATIONS  (PRN):  acetaminophen   Oral Liquid .. 650 milliGRAM(s) Oral every 6 hours PRN Temp greater or equal to 38C (100.4F), Mild Pain (1 - 3)  HYDROmorphone  Injectable 0.5 milliGRAM(s) IV Push every 4 hours PRN dysynchronny      PHYSICAL EXAMINATION  General: NAD   HEENT: Trach present, mouth open with mouth guard present, and eyes with scleral icterus   Cards: S1/S2, no murmurs   Pulm: Course vent sounds bilaterally. No wheezes.   Abdomen: Soft, nondistended and nontender. PEG (+)   Extremities: No pedal edema. No active SABINO of BL upper and lower extremities.  Neurology: Eyes open but does not follow commands with no acute focal neurological deficits     LABS:                        6.9    16.84 )-----------( 256      ( 09 May 2025 05:10 )             21.0     05-10    142  |  114[H]  |  58[H]  ----------------------------<  126[H]  4.1   |  16[L]  |  0.98    Ca    10.7[H]      10 May 2025 06:24  Phos  2.9     05-10  Mg     2.10     05-10    TPro  7.7  /  Alb  2.8[L]  /  TBili  2.2[H]  /  DBili  x   /  AST  81[H]  /  ALT  72[H]  /  AlkPhos  586[H]  05-10      Urinalysis Basic - ( 10 May 2025 06:24 )  Color: x / Appearance: x / SG: x / pH: x  Gluc: 126 mg/dL / Ketone: x  / Bili: x / Urobili: x   Blood: x / Protein: x / Nitrite: x   Leuk Esterase: x / RBC: x / WBC x   Sq Epi: x / Non Sq Epi: x / Bacteria: x            PAST MEDICAL & SURGICAL HISTORY:  Sickle cell anemia      Gout      Deep vein thrombosis (DVT)      Iron overload      Hypertension      History of cholecystectomy      History of removal of Port-a-Cath          FAMILY HISTORY:  Family history of sickle cell trait (Father, Mother)    Patient's father is  (Father)    Patient's mother is  (Mother)        Social History:  Lives alone (15 Mar 2025 09:54)      RADIOLOGY:  [ ] Reviewed and interpreted by me    PULMONARY FUNCTION TESTS:    EKG:

## 2025-05-10 NOTE — PROGRESS NOTE ADULT - NS ATTEND AMEND GEN_ALL_CORE FT
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 transfusion dependence c/b iron overload with cirrhosis, HTN, and RUE DVT (12/2024) on NOAC presenting to [facility] on 3/15/25 from hematology office for vaso-occlusive sickle cell crisis with acute chest syndrome and hypoxemic respiratory failure with hospital course c/b left renal mass s/p left ureteroscopy with biopsy/stent placement (3/19) c/b acute on chronic anemia 2/2 hemorrhagic shock from left perirenal-subcapsular hemorrhage c/b PEA arrest with ROSC (3/20, unclear downtime) s/p emergent IR embolization but c/b severe post-cardiac arrest hypoxic-ischemic encephalopathy with failure to wean from ventilator s/p tracheostomy placement (4/4/25) now in RCU.    - hemoglobins remain stable without active signs of crisis  - hypercalcemia noted, will repeat with ionized calcium as well  - c/w hydration  - dispo planning

## 2025-05-10 NOTE — PROGRESS NOTE ADULT - ASSESSMENT
43 YO M with PMHx of sickle cell disease with prior acute chest syndrome requiring transfusion and exchange in the past (last 12/2024), iron overload, gout, HTN, and RUE DVT in 12/2024 on eliquis who presented on 3/15 by his hematologist second to anemia and hypoxia, down to 5.3 from baseline 7-8, with concern for vaso-occlusive crisis. While on medicine, anemia improved post transfusion and continued on SCC pain control PCA. Patient noted with left renal mass during prior admission and s/p L ureteroscopy with biopsy and stent placement on 3/19. Course complicated with hypoglycemia on 3/20 with RRT x 2 and found with severe anemia to 3.4 with hemorrhagic shock second to left perirenal and subcapsular hemorrhage and ultimately PEA arrest. ROSC achieved and transferred to MICU. s/p IR emobilization and course complicated anoxic brain injury, myoclonic jerks, GIB, MATA, HAGMA, DVTs (R IJ and LUE brachial), dysphagia, cirrhosis, and prolonged vent time s/p trach on 4/4. Transferred to RCU on 4/18.     NEUROLOGY  # Anoxic brain injury   - s/p cardiac arrest on 3/20 with ROSC in 4 minutes   - CT HEAD post arrest with diffuse sulcal effacement with increased intracranial pressure, and few patchy foci of cortical blurring concern for HIE  - MRI brain post arrest with diffuse global abnormal supratentorial cortical restricted diffusion consistent with global hypoxic injury   - Monitor for now    # Seizures vs myoclonic jerks   - Witness myoclonic jerking concerning for seizures with anoxic brain injury  - vEEG with abundant myoclonic seizures in the setting of a severe diffuse/multifocal cerebral dysfunction which can be seen in the setting of sedative effect or due to anoxia  - s/p valium 10 Q4H   - Continue on keppra   - Continue on klonopin 2mg TID   - Continue on dilaudid PRN     # Central fevers   - Persistent fever spikes noted with concern for central fevers   - Bromocriptine started on 4/23, however noted with rise in LFTs and now stopped on 4/27   - Monitor fever curve.     HEENT  # Tongue biting   - Noted with tongue trauma  - s/p bite block replacement by OMFS on 4/23  - s/p dental mouth guard scan on 5/2   - Mouth guard to be delivered on 5/16   - Dental will continue with mouth care and reposition bite block PRN    # Thrush   - On nystatin (4/28 - 5/4)   - Continue with mouth care     CARDIOLOGY  # Shock state likely hemorrhagic vs vasoplegia  - Acute renal bleed noted requiring multiple transfusions and pressors in ICU.   - TTE 3/22 with EF 63 with normal LVRVSF with PASP 49  - Weaned off pressors in ICU and complicated by hypertension.   - Continue on norvasc 5 QD   - Monitor BP     # Autonomic vs pain  - Mild HTN and tachypnea noted likely pain induced vs autonomia?   - Continue on dilaudid pushes PRN    PULMONARY  # Respiratory failure   - Presented with SCC and hypoxia likely from anemia with no signs of acute chest, however noted with PEA with hemorrhagic shock post renal bx requiring intubation.   - Prolonged intubation and s/p 7 PORTEX trach on 4/4   - Continue on vent 20/460/6/30 with nebs and chest PT  - Continue on dilaudid PRN for dyssynchrony     GI  # Shock liver vs cirrhosis with sickle cell   - Transaminitis noted in ICU and thought to be second to shock liver likely second to hemorrhagic shock vs cardiac arrest, however LFTs remains elevated with acute on chronic hyperbilirubinemia.   - US ABD suggestive of early cirrhotic changes, cholecystectomy, and CBD 9mm   - CT AP with cirrhosis   - LFTs rising with bromocriptine   - Holding further bromocriptine   - Trend LFTs and bili     # GIB  - Anemia with GIB noted in ICU   - s/p EGD 4/11 with cauterization of gastric ulcer & hemostatic spraying of three duodenal ulcers. Gastric ulcer likely due to NGT trauma.   - Continue on PPI BID   - Monitor stools and HH     # Oropharyngeal dysphagia   - GI unable to place PEG due to hepatomegaly.   - IR unable to place PEG second to no safe window   - s/p open G TUBE placement on 4/18   - Continue on TF    RENAL  # Acute renal failure and HAGMA second to shock state   - Scr on admission was 1.25 (3/15/25) and increased to 6s while in ICU and s/p HD 3/24-3/29 and then again on 4/4.   - L SHILEY removed on 4/15   - Continue on HCO3 1300 TID tabs with improved acidosis, however hypernatremic and stopped.   - Monitor renal function, acidosis, and UOP     # L renal mass bx c/b hematoma   - Renal bx as below on 3/19  - Hemorrhagic shock and PEA arrest on 3/20   - Renal US with large L renal subcapsular hematoma.    - IR left renal angiogram and embolization on 3/20   - CT 4/6 with unchanged large left subcapsular and perinephric hematoma.  - CT A/P (4/20) showing stable subcapsular renal hematoma and NO new active bleeds.  - Monitor for now    # Urinary retention   - Passed TOV on 4/23   - Urinary retention again and likely neurogenic bladder   - Dave placed 5/1   - Hold further TOVs    # Hypernatremia  - s/p D5W   - Continue on  Q6H and monitor sodium     # Hypercalcemia likely from dehydration   - Rehydrate and calcium coming down.   - Rehydrate again today and monitor calcium.     # Hyperphosphatemia   - Phos elevation likely second to renal failure   - PTH elevated, however Phos slowly down trending   - Monitor phos    INFECTIOUS DISEASE   # Central fevers  - Recurrent fevers and complete LVQ empirically as below   - Started on bromocriptine with improvement in fever curve, however noted with transaminitis and bromocriptine stopped with return of elevated temperature intermittently.   - Patient with known R IJ DVT and L kidney hematoma and now new LLE DVT that can cause intermittent fevers.   - Hold work up for now   - Hold further ABX for now   - Monitor fever curve.     # Questionable UTI  - Recurrent fevers with POSITIVE UA and s/p LVQ (4/23 - 4/28)   - Monitor off ABX     # VAP   - Post arrest noted with concern for aspiration and VAP in ICU   - Flu, SCx, BAL, BCx and UCx negative   - MRSA PCR with MSSA and completed bactroban in ICU   - Completed aztreonam (3/22-3/27) then casey (3/27-3/31) and vanco by dose in ICU   - CXR in ICU with RUL consolidation and completed recurrent casey course in ICU (4/2-4/7).     HEMATOLOGIC  # Anemia with SCC vs L kidney bleed vs GIB   - Baseline hemoglobin outpatient ~6.0-7.0 and sent in for anemia down to 5.3 without signs of bleeding and more likely SCC.    - Transfusion given on admission and improved back to baseline.   - Course complicated by hemorrhagic shock from left renal hematoma and HH down to 3.4.  - s/p 10U PRBC total while in ICU   - s/p PRBC pre-GTUBE with HH increased to 8.0  - c/b rapid decline in HH post GTUBE likely normalizing to baseline 6-7 as RPT CT AP post G TUBE with new bleeds and no acute signs of sickling.   - s/p PRBC 4/22   - Monitor HH     # SCD   - Discontinued deferasirox due to current renal failure   - Monitor Sickle labs QD (CBC with Diff, Retic, BMP, Hepatic Function, LDH and Hapto, and VBG).     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   - Discussed with IR recommend holding IVC filter for now   - Continue on DVT PPX HSQ   - LUE and LLE precautions     # LUE arm infiltration   - HCO3 GTT infiltration in ICU   - Seen by hand sx and no compartment syndrome concern   - Monitor for now     # RUE blood infiltration   - RUE blood transfusion infiltration noted on 4/17 with area of ecchymosis   - RPT DOPPLER with known R IJ DVT, however no upper arm thrombus or issues in area of infiltration  - Monitor site for now    ENDOCRINE   # Glycemic control   - Monitor BG   - No hx of DM2     SKIN   - Wound care reccs appreciated    ETHICS   - FULL CODE     DISPO - vent facility when able

## 2025-05-11 LAB
ALBUMIN SERPL ELPH-MCNC: 3 G/DL — LOW (ref 3.3–5)
ALP SERPL-CCNC: 577 U/L — HIGH (ref 40–120)
ALT FLD-CCNC: 65 U/L — HIGH (ref 4–41)
ANION GAP SERPL CALC-SCNC: 11 MMOL/L — SIGNIFICANT CHANGE UP (ref 7–14)
AST SERPL-CCNC: 70 U/L — HIGH (ref 4–40)
BASOPHILS # BLD AUTO: 0.08 K/UL — SIGNIFICANT CHANGE UP (ref 0–0.2)
BASOPHILS NFR BLD AUTO: 0.5 % — SIGNIFICANT CHANGE UP (ref 0–2)
BILIRUB SERPL-MCNC: 2.1 MG/DL — HIGH (ref 0.2–1.2)
BUN SERPL-MCNC: 51 MG/DL — HIGH (ref 7–23)
CA-I BLD-SCNC: 1.57 MMOL/L — HIGH (ref 1.15–1.29)
CALCIUM SERPL-MCNC: 10.9 MG/DL — HIGH (ref 8.4–10.5)
CHLORIDE SERPL-SCNC: 114 MMOL/L — HIGH (ref 98–107)
CO2 SERPL-SCNC: 17 MMOL/L — LOW (ref 22–31)
CREAT SERPL-MCNC: 0.98 MG/DL — SIGNIFICANT CHANGE UP (ref 0.5–1.3)
EGFR: 97 ML/MIN/1.73M2 — SIGNIFICANT CHANGE UP
EGFR: 97 ML/MIN/1.73M2 — SIGNIFICANT CHANGE UP
EOSINOPHIL # BLD AUTO: 0.28 K/UL — SIGNIFICANT CHANGE UP (ref 0–0.5)
EOSINOPHIL NFR BLD AUTO: 1.6 % — SIGNIFICANT CHANGE UP (ref 0–6)
GLUCOSE BLDC GLUCOMTR-MCNC: 138 MG/DL — HIGH (ref 70–99)
GLUCOSE SERPL-MCNC: 139 MG/DL — HIGH (ref 70–99)
HAPTOGLOB SERPL-MCNC: 43 MG/DL — SIGNIFICANT CHANGE UP (ref 34–200)
HCT VFR BLD CALC: 20.2 % — CRITICAL LOW (ref 39–50)
HGB BLD-MCNC: 6.7 G/DL — CRITICAL LOW (ref 13–17)
IANC: 13.7 K/UL — HIGH (ref 1.8–7.4)
IMM GRANULOCYTES NFR BLD AUTO: 0.5 % — SIGNIFICANT CHANGE UP (ref 0–0.9)
LDH SERPL L TO P-CCNC: 201 U/L — SIGNIFICANT CHANGE UP (ref 135–225)
LYMPHOCYTES # BLD AUTO: 1.96 K/UL — SIGNIFICANT CHANGE UP (ref 1–3.3)
LYMPHOCYTES # BLD AUTO: 11.4 % — LOW (ref 13–44)
MAGNESIUM SERPL-MCNC: 2 MG/DL — SIGNIFICANT CHANGE UP (ref 1.6–2.6)
MCHC RBC-ENTMCNC: 31 PG — SIGNIFICANT CHANGE UP (ref 27–34)
MCHC RBC-ENTMCNC: 33.2 G/DL — SIGNIFICANT CHANGE UP (ref 32–36)
MCV RBC AUTO: 93.5 FL — SIGNIFICANT CHANGE UP (ref 80–100)
MONOCYTES # BLD AUTO: 1.03 K/UL — HIGH (ref 0–0.9)
MONOCYTES NFR BLD AUTO: 6 % — SIGNIFICANT CHANGE UP (ref 2–14)
NEUTROPHILS # BLD AUTO: 13.7 K/UL — HIGH (ref 1.8–7.4)
NEUTROPHILS NFR BLD AUTO: 80 % — HIGH (ref 43–77)
NRBC # BLD AUTO: 0.02 K/UL — HIGH (ref 0–0)
NRBC # FLD: 0.02 K/UL — HIGH (ref 0–0)
NRBC BLD AUTO-RTO: 0 /100 WBCS — SIGNIFICANT CHANGE UP (ref 0–0)
PHOSPHATE SERPL-MCNC: 2.8 MG/DL — SIGNIFICANT CHANGE UP (ref 2.5–4.5)
PLATELET # BLD AUTO: 244 K/UL — SIGNIFICANT CHANGE UP (ref 150–400)
POTASSIUM SERPL-MCNC: 3.8 MMOL/L — SIGNIFICANT CHANGE UP (ref 3.5–5.3)
POTASSIUM SERPL-SCNC: 3.8 MMOL/L — SIGNIFICANT CHANGE UP (ref 3.5–5.3)
PROT SERPL-MCNC: 7.6 G/DL — SIGNIFICANT CHANGE UP (ref 6–8.3)
RBC # BLD: 2.16 M/UL — LOW (ref 4.2–5.8)
RBC # BLD: 2.16 M/UL — LOW (ref 4.2–5.8)
RBC # FLD: 17.9 % — HIGH (ref 10.3–14.5)
RETICS #: 147.3 K/UL — HIGH (ref 25–125)
RETICS/RBC NFR: 6.9 % — HIGH (ref 0.5–2.5)
SODIUM SERPL-SCNC: 142 MMOL/L — SIGNIFICANT CHANGE UP (ref 135–145)
WBC # BLD: 17.14 K/UL — HIGH (ref 3.8–10.5)
WBC # FLD AUTO: 17.14 K/UL — HIGH (ref 3.8–10.5)

## 2025-05-11 PROCEDURE — 99233 SBSQ HOSP IP/OBS HIGH 50: CPT

## 2025-05-11 RX ORDER — CINACALCET 30 MG/1
30 TABLET, FILM COATED ORAL EVERY 12 HOURS
Refills: 0 | Status: DISCONTINUED | OUTPATIENT
Start: 2025-05-11 | End: 2025-05-16

## 2025-05-11 RX ORDER — CINACALCET 30 MG/1
30 TABLET, FILM COATED ORAL
Refills: 0 | Status: DISCONTINUED | OUTPATIENT
Start: 2025-05-11 | End: 2025-05-11

## 2025-05-11 RX ORDER — CALCITONIN,SALMON,SYNTHETIC 200/SPRAY
360 AEROSOL, SPRAY WITH PUMP (ML) NASAL ONCE
Refills: 0 | Status: COMPLETED | OUTPATIENT
Start: 2025-05-11 | End: 2025-05-11

## 2025-05-11 RX ADMIN — Medication 40 MILLIGRAM(S): at 05:04

## 2025-05-11 RX ADMIN — Medication 100 MILLILITER(S): at 10:13

## 2025-05-11 RX ADMIN — HEPARIN SODIUM 5000 UNIT(S): 1000 INJECTION INTRAVENOUS; SUBCUTANEOUS at 05:03

## 2025-05-11 RX ADMIN — HEPARIN SODIUM 5000 UNIT(S): 1000 INJECTION INTRAVENOUS; SUBCUTANEOUS at 17:02

## 2025-05-11 RX ADMIN — Medication 15 MILLILITER(S): at 21:23

## 2025-05-11 RX ADMIN — Medication 100 MILLILITER(S): at 20:52

## 2025-05-11 RX ADMIN — LEVETIRACETAM 500 MILLIGRAM(S): 10 INJECTION, SOLUTION INTRAVENOUS at 17:02

## 2025-05-11 RX ADMIN — Medication 40 MILLIGRAM(S): at 18:00

## 2025-05-11 RX ADMIN — Medication 15 MILLILITER(S): at 13:03

## 2025-05-11 RX ADMIN — AMLODIPINE BESYLATE 5 MILLIGRAM(S): 10 TABLET ORAL at 05:11

## 2025-05-11 RX ADMIN — Medication 1 APPLICATION(S): at 10:16

## 2025-05-11 RX ADMIN — LEVETIRACETAM 500 MILLIGRAM(S): 10 INJECTION, SOLUTION INTRAVENOUS at 05:04

## 2025-05-11 RX ADMIN — IPRATROPIUM BROMIDE AND ALBUTEROL SULFATE 3 MILLILITER(S): .5; 2.5 SOLUTION RESPIRATORY (INHALATION) at 21:56

## 2025-05-11 RX ADMIN — Medication 360 INTERNATIONAL UNIT(S): at 15:35

## 2025-05-11 RX ADMIN — CLONAZEPAM 2 MILLIGRAM(S): 0.5 TABLET ORAL at 05:05

## 2025-05-11 RX ADMIN — CLONAZEPAM 2 MILLIGRAM(S): 0.5 TABLET ORAL at 13:01

## 2025-05-11 RX ADMIN — IPRATROPIUM BROMIDE AND ALBUTEROL SULFATE 3 MILLILITER(S): .5; 2.5 SOLUTION RESPIRATORY (INHALATION) at 15:28

## 2025-05-11 RX ADMIN — IPRATROPIUM BROMIDE AND ALBUTEROL SULFATE 3 MILLILITER(S): .5; 2.5 SOLUTION RESPIRATORY (INHALATION) at 08:28

## 2025-05-11 RX ADMIN — CINACALCET 30 MILLIGRAM(S): 30 TABLET, FILM COATED ORAL at 20:06

## 2025-05-11 RX ADMIN — Medication 15 MILLILITER(S): at 05:04

## 2025-05-11 RX ADMIN — CLONAZEPAM 2 MILLIGRAM(S): 0.5 TABLET ORAL at 21:23

## 2025-05-11 RX ADMIN — IPRATROPIUM BROMIDE AND ALBUTEROL SULFATE 3 MILLILITER(S): .5; 2.5 SOLUTION RESPIRATORY (INHALATION) at 03:31

## 2025-05-11 NOTE — PROGRESS NOTE ADULT - SUBJECTIVE AND OBJECTIVE BOX
RCU PROGRESS NOTE     CHIEF COMPLAINT: Patient is a 44y old  Male who presents with a chief complaint of Referred by Hematologist for low Hg (09 May 2025 06:42)      INTERVAL EVENTS:  - Calcium coming down post IVF and rehydrating again today     REVIEW OF SYSTEMS: Seen by bedside during AM rounds and unable to assess ROS as anoxic     Mode: AC/ CMV (Assist Control/ Continuous Mandatory Ventilation), RR (machine): 20, TV (machine): 460, FiO2: 30, PEEP: 6, ITime: 0.86, MAP: 12, PIP: 25      OBJECTIVE:  ICU Vital Signs Last 24 Hrs  T(C): 36.9 (10 May 2025 04:00), Max: 37.4 (09 May 2025 20:00)  T(F): 98.5 (10 May 2025 04:00), Max: 99.4 (09 May 2025 20:00)  HR: 93 (10 May 2025 07:45) (93 - 121)  BP: 160/83 (10 May 2025 04:00) (120/74 - 160/83)  BP(mean): 89 (09 May 2025 12:00) (86 - 89)  ABP: --  ABP(mean): --  RR: 20 (10 May 2025 04:00) (20 - 20)  SpO2: 100% (10 May 2025 07:45) (100% - 100%)    O2 Parameters below as of 10 May 2025 07:45  Patient On (Oxygen Delivery Method): ventilator          Mode: AC/ CMV (Assist Control/ Continuous Mandatory Ventilation), RR (machine): 20, TV (machine): 460, FiO2: 30, PEEP: 6, ITime: 0.86, MAP: 12, PIP: 25    05-09 @ 07:01  -  -10 @ 07:00  --------------------------------------------------------  IN: 2585 mL / OUT: 2000 mL / NET: 585 mL      CAPILLARY BLOOD GLUCOSE  POCT Blood Glucose.: 139 mg/dL (10 May 2025 04:25)      HOSPITAL MEDICATIONS:  MEDICATIONS  (STANDING):  albuterol/ipratropium for Nebulization 3 milliLiter(s) Nebulizer every 6 hours  amLODIPine   Tablet 5 milliGRAM(s) Oral daily  Biotene Dry Mouth Oral Rinse 15 milliLiter(s) Swish and Spit every 8 hours  chlorhexidine 2% Cloths 1 Application(s) Topical daily  clonazePAM  Tablet 2 milliGRAM(s) Oral every 8 hours  heparin   Injectable 5000 Unit(s) SubCutaneous every 12 hours  influenza   Vaccine 0.5 milliLiter(s) IntraMuscular once  levETIRAcetam  Solution 500 milliGRAM(s) Oral two times a day  pantoprazole   Suspension 40 milliGRAM(s) Oral every 12 hours  sodium chloride 0.9%. 1000 milliLiter(s) (100 mL/Hr) IV Continuous <Continuous>    MEDICATIONS  (PRN):  acetaminophen   Oral Liquid .. 650 milliGRAM(s) Oral every 6 hours PRN Temp greater or equal to 38C (100.4F), Mild Pain (1 - 3)  HYDROmorphone  Injectable 0.5 milliGRAM(s) IV Push every 4 hours PRN dysynchronny      PHYSICAL EXAMINATION  General: NAD   HEENT: Trach present, mouth open with mouth guard present, and eyes with scleral icterus   Cards: S1/S2, no murmurs   Pulm: Course vent sounds bilaterally. No wheezes.   Abdomen: Soft, nondistended and nontender. PEG (+)   Extremities: No pedal edema. No active SABINO of BL upper and lower extremities.  Neurology: Eyes open but does not follow commands with no acute focal neurological deficits     LABS:                        6.9    16.84 )-----------( 256      ( 09 May 2025 05:10 )             21.0     05-10    142  |  114[H]  |  58[H]  ----------------------------<  126[H]  4.1   |  16[L]  |  0.98    Ca    10.7[H]      10 May 2025 06:24  Phos  2.9     05-10  Mg     2.10     05-10    TPro  7.7  /  Alb  2.8[L]  /  TBili  2.2[H]  /  DBili  x   /  AST  81[H]  /  ALT  72[H]  /  AlkPhos  586[H]  05-10      Urinalysis Basic - ( 10 May 2025 06:24 )  Color: x / Appearance: x / SG: x / pH: x  Gluc: 126 mg/dL / Ketone: x  / Bili: x / Urobili: x   Blood: x / Protein: x / Nitrite: x   Leuk Esterase: x / RBC: x / WBC x   Sq Epi: x / Non Sq Epi: x / Bacteria: x            PAST MEDICAL & SURGICAL HISTORY:  Sickle cell anemia      Gout      Deep vein thrombosis (DVT)      Iron overload      Hypertension      History of cholecystectomy      History of removal of Port-a-Cath          FAMILY HISTORY:  Family history of sickle cell trait (Father, Mother)    Patient's father is  (Father)    Patient's mother is  (Mother)        Social History:  Lives alone (15 Mar 2025 09:54)      RADIOLOGY:  [ ] Reviewed and interpreted by me    PULMONARY FUNCTION TESTS:    EKG: RCU PROGRESS NOTE     CHIEF COMPLAINT: Patient is a 44y old  Male who presents with a chief complaint of Referred by Hematologist for low Hg (09 May 2025 06:42)      INTERVAL EVENTS:  - Calcium stagnant despite IVF and will dose calcitonin     REVIEW OF SYSTEMS: Seen by bedside during AM rounds and unable to assess ROS as anoxic     Mode: AC/ CMV (Assist Control/ Continuous Mandatory Ventilation), RR (machine): 20, TV (machine): 460, FiO2: 30, PEEP: 6, ITime: 0.86, MAP: 12, PIP: 25      OBJECTIVE:  ICU Vital Signs Last 24 Hrs  T(C): 36.9 (10 May 2025 04:00), Max: 37.4 (09 May 2025 20:00)  T(F): 98.5 (10 May 2025 04:00), Max: 99.4 (09 May 2025 20:00)  HR: 93 (10 May 2025 07:45) (93 - 121)  BP: 160/83 (10 May 2025 04:00) (120/74 - 160/83)  BP(mean): 89 (09 May 2025 12:00) (86 - 89)  ABP: --  ABP(mean): --  RR: 20 (10 May 2025 04:00) (20 - 20)  SpO2: 100% (10 May 2025 07:45) (100% - 100%)    O2 Parameters below as of 10 May 2025 07:45  Patient On (Oxygen Delivery Method): ventilator          Mode: AC/ CMV (Assist Control/ Continuous Mandatory Ventilation), RR (machine): 20, TV (machine): 460, FiO2: 30, PEEP: 6, ITime: 0.86, MAP: 12, PIP: 25    05-09 @ 07:01  -  -10 @ 07:00  --------------------------------------------------------  IN: 2585 mL / OUT: 2000 mL / NET: 585 mL      CAPILLARY BLOOD GLUCOSE  POCT Blood Glucose.: 139 mg/dL (10 May 2025 04:25)      HOSPITAL MEDICATIONS:  MEDICATIONS  (STANDING):  albuterol/ipratropium for Nebulization 3 milliLiter(s) Nebulizer every 6 hours  amLODIPine   Tablet 5 milliGRAM(s) Oral daily  Biotene Dry Mouth Oral Rinse 15 milliLiter(s) Swish and Spit every 8 hours  chlorhexidine 2% Cloths 1 Application(s) Topical daily  clonazePAM  Tablet 2 milliGRAM(s) Oral every 8 hours  heparin   Injectable 5000 Unit(s) SubCutaneous every 12 hours  influenza   Vaccine 0.5 milliLiter(s) IntraMuscular once  levETIRAcetam  Solution 500 milliGRAM(s) Oral two times a day  pantoprazole   Suspension 40 milliGRAM(s) Oral every 12 hours  sodium chloride 0.9%. 1000 milliLiter(s) (100 mL/Hr) IV Continuous <Continuous>    MEDICATIONS  (PRN):  acetaminophen   Oral Liquid .. 650 milliGRAM(s) Oral every 6 hours PRN Temp greater or equal to 38C (100.4F), Mild Pain (1 - 3)  HYDROmorphone  Injectable 0.5 milliGRAM(s) IV Push every 4 hours PRN dysynchronny      PHYSICAL EXAMINATION  General: NAD   HEENT: Trach present, mouth open with mouth guard present, and eyes with scleral icterus   Cards: S1/S2, no murmurs   Pulm: Course vent sounds bilaterally. No wheezes.   Abdomen: Soft, nondistended and nontender. PEG (+)   Extremities: No pedal edema. No active SABINO of BL upper and lower extremities.  Neurology: Eyes open but does not follow commands with no acute focal neurological deficits     LABS:                        6.9    16.84 )-----------( 256      ( 09 May 2025 05:10 )             21.0     05-10    142  |  114[H]  |  58[H]  ----------------------------<  126[H]  4.1   |  16[L]  |  0.98    Ca    10.7[H]      10 May 2025 06:24  Phos  2.9     05-10  Mg     2.10     05-10    TPro  7.7  /  Alb  2.8[L]  /  TBili  2.2[H]  /  DBili  x   /  AST  81[H]  /  ALT  72[H]  /  AlkPhos  586[H]  05-10                            6.7    17.14 )-----------( 244      ( 11 May 2025 05:20 )             20.2       05-11    142  |  114[H]  |  51[H]  ----------------------------<  139[H]  3.8   |  17[L]  |  0.98    Ca    10.9[H]      11 May 2025 05:20  Phos  2.8     05-  Mg     2.00         TPro  7.6  /  Alb  3.0[L]  /  TBili  2.1[H]  /  DBili  x   /  AST  70[H]  /  ALT  65[H]  /  AlkPhos  577[H]          Urinalysis Basic - ( 10 May 2025 06:24 )  Color: x / Appearance: x / SG: x / pH: x  Gluc: 126 mg/dL / Ketone: x  / Bili: x / Urobili: x   Blood: x / Protein: x / Nitrite: x   Leuk Esterase: x / RBC: x / WBC x   Sq Epi: x / Non Sq Epi: x / Bacteria: x            PAST MEDICAL & SURGICAL HISTORY:  Sickle cell anemia      Gout      Deep vein thrombosis (DVT)      Iron overload      Hypertension      History of cholecystectomy      History of removal of Port-a-Cath          FAMILY HISTORY:  Family history of sickle cell trait (Father, Mother)    Patient's father is  (Father)    Patient's mother is  (Mother)        Social History:  Lives alone (15 Mar 2025 09:54)      RADIOLOGY:  [ ] Reviewed and interpreted by me    PULMONARY FUNCTION TESTS:    EKG:

## 2025-05-11 NOTE — PROGRESS NOTE ADULT - NS ATTEND AMEND GEN_ALL_CORE FT
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 transfusion dependence c/b iron overload with cirrhosis, HTN, and RUE DVT (12/2024) on NOAC presenting to [facility] on 3/15/25 from hematology office for vaso-occlusive sickle cell crisis with acute chest syndrome and hypoxemic respiratory failure with hospital course c/b left renal mass s/p left ureteroscopy with biopsy/stent placement (3/19) c/b acute on chronic anemia 2/2 hemorrhagic shock from left perirenal-subcapsular hemorrhage c/b PEA arrest with ROSC (3/20, unclear downtime) s/p emergent IR embolization but c/b severe post-cardiac arrest hypoxic-ischemic encephalopathy with failure to wean from ventilator s/p tracheostomy placement (4/4/25) now in RCU.    - hemoglobins remain stable without active signs of crisis  - hypercalcemia noted, start calcitonin. may require bisphosphonate if continued elevation; f/u PTH  - c/w hydration  - dispo planning

## 2025-05-11 NOTE — PROGRESS NOTE ADULT - TIME BILLING
Patient reports that she has this right breast mass since 15 years.  She reports that she had a biopsy done in [place] which came back negative.  She reports that she had screening mammogram last year which showed calcification in her right breast and then a diagnostic mammogram and was recommended follow-up mammogram in 6 months.   Reviewing the EMR, vitals, imaging, medication list recent labs, prior records, and coordinating care with medical providers. This time excludes procedures and teaching.

## 2025-05-11 NOTE — PROGRESS NOTE ADULT - ASSESSMENT
43 YO M with PMHx of sickle cell disease with prior acute chest syndrome requiring transfusion and exchange in the past (last 12/2024), iron overload, gout, HTN, and RUE DVT in 12/2024 on eliquis who presented on 3/15 by his hematologist second to anemia and hypoxia, down to 5.3 from baseline 7-8, with concern for vaso-occlusive crisis. While on medicine, anemia improved post transfusion and continued on SCC pain control PCA. Patient noted with left renal mass during prior admission and s/p L ureteroscopy with biopsy and stent placement on 3/19. Course complicated with hypoglycemia on 3/20 with RRT x 2 and found with severe anemia to 3.4 with hemorrhagic shock second to left perirenal and subcapsular hemorrhage and ultimately PEA arrest. ROSC achieved and transferred to MICU. s/p IR emobilization and course complicated anoxic brain injury, myoclonic jerks, GIB, MATA, HAGMA, DVTs (R IJ and LUE brachial), dysphagia, cirrhosis, and prolonged vent time s/p trach on 4/4. Transferred to [facility] on 4/18.     NEUROLOGY  # Anoxic brain injury   - s/p cardiac arrest on 3/20 with ROSC in 4 minutes   - CT HEAD post arrest with diffuse sulcal effacement with increased intracranial pressure, and few patchy foci of cortical blurring concern for HIE  - MRI brain post arrest with diffuse global abnormal supratentorial cortical restricted diffusion consistent with global hypoxic injury   - Monitor for now    # Seizures vs myoclonic jerks   - Witness myoclonic jerking concerning for seizures with anoxic brain injury  - vEEG with abundant myoclonic seizures in the setting of a severe diffuse/multifocal cerebral dysfunction which can be seen in the setting of sedative effect or due to anoxia  - s/p valium 10 Q4H   - Continue on keppra   - Continue on klonopin 2mg TID   - Continue on dilaudid PRN     # Central fevers   - Persistent fever spikes noted with concern for central fevers   - Bromocriptine started on 4/23, however noted with rise in LFTs and now stopped on 4/27   - Monitor fever curve.     HEENT  # Tongue biting   - Noted with tongue trauma  - s/p bite block replacement by OMFS on 4/23  - s/p dental mouth guard scan on 5/2   - Mouth guard to be delivered on 5/16   - Dental will continue with mouth care and reposition bite block PRN    # Thrush   - On nystatin (4/28 - 5/4)   - Continue with mouth care     CARDIOLOGY  # Shock state likely hemorrhagic vs vasoplegia  - Acute renal bleed noted requiring multiple transfusions and pressors in ICU.   - TTE 3/22 with EF 63 with normal LVRVSF with PASP 49  - Weaned off pressors in ICU and complicated by hypertension.   - Continue on norvasc 5 QD   - Monitor BP     # Autonomic vs pain  - Mild HTN and tachypnea noted likely pain induced vs autonomia?   - Continue on dilaudid pushes PRN    PULMONARY  # Respiratory failure   - Presented with SCC and hypoxia likely from anemia with no signs of acute chest, however noted with PEA with hemorrhagic shock post renal bx requiring intubation.   - Prolonged intubation and s/p 7 PORTEX trach on 4/4   - Continue on vent 20/460/6/30 with nebs and chest PT  - Continue on dilaudid PRN for dyssynchrony     GI  # Shock liver vs cirrhosis with sickle cell   - Transaminitis noted in ICU and thought to be second to shock liver likely second to hemorrhagic shock vs cardiac arrest, however LFTs remains elevated with acute on chronic hyperbilirubinemia.   - US ABD suggestive of early cirrhotic changes, cholecystectomy, and CBD 9mm   - CT AP with cirrhosis   - LFTs rising with bromocriptine   - Holding further bromocriptine   - Trend LFTs and bili     # GIB  - Anemia with GIB noted in ICU   - s/p EGD 4/11 with cauterization of gastric ulcer & hemostatic spraying of three duodenal ulcers. Gastric ulcer likely due to NGT trauma.   - Continue on PPI BID   - Monitor stools and HH     # Oropharyngeal dysphagia   - GI unable to place PEG due to hepatomegaly.   - IR unable to place PEG second to no safe window   - s/p open G TUBE placement on 4/18   - Continue on TF    RENAL  # Acute renal failure and HAGMA second to shock state   - Scr on admission was 1.25 (3/15/25) and increased to 6s while in ICU and s/p HD 3/24-3/29 and then again on 4/4.   - L SHILEY removed on 4/15   - Continue on HCO3 1300 TID tabs with improved acidosis, however hypernatremic and stopped.   - Monitor renal function, acidosis, and UOP     # L renal mass bx c/b hematoma   - Renal bx as below on 3/19  - Hemorrhagic shock and PEA arrest on 3/20   - Renal US with large L renal subcapsular hematoma.    - IR left renal angiogram and embolization on 3/20   - CT 4/6 with unchanged large left subcapsular and perinephric hematoma.  - CT A/P (4/20) showing stable subcapsular renal hematoma and NO new active bleeds.  - Monitor for now    # Urinary retention   - Passed TOV on 4/23   - Urinary retention again and likely neurogenic bladder   - Dave placed 5/1   - Hold further TOVs    # Hypernatremia  - s/p D5W   - Continue on  Q6H and monitor sodium     # Hypercalcemia likely from dehydration   - Rehydrate and calcium coming down.   - Rehydrate again today and monitor calcium.     # Hyperphosphatemia   - Phos elevation likely second to renal failure   - PTH elevated, however Phos slowly down trending   - Monitor phos    INFECTIOUS DISEASE   # Central fevers  - Recurrent fevers and complete LVQ empirically as below   - Started on bromocriptine with improvement in fever curve, however noted with transaminitis and bromocriptine stopped with return of elevated temperature intermittently.   - Patient with known R IJ DVT and L kidney hematoma and now new LLE DVT that can cause intermittent fevers.   - Hold work up for now   - Hold further ABX for now   - Monitor fever curve.     # Questionable UTI  - Recurrent fevers with POSITIVE UA and s/p LVQ (4/23 - 4/28)   - Monitor off ABX     # VAP   - Post arrest noted with concern for aspiration and VAP in ICU   - Flu, SCx, BAL, BCx and UCx negative   - MRSA PCR with MSSA and completed bactroban in ICU   - Completed aztreonam (3/22-3/27) then casey (3/27-3/31) and vanco by dose in ICU   - CXR in ICU with RUL consolidation and completed recurrent casey course in ICU (4/2-4/7).     HEMATOLOGIC  # Anemia with SCC vs L kidney bleed vs GIB   - Baseline hemoglobin outpatient ~6.0-7.0 and sent in for anemia down to 5.3 without signs of bleeding and more likely SCC.    - Transfusion given on admission and improved back to baseline.   - Course complicated by hemorrhagic shock from left renal hematoma and HH down to 3.4.  - s/p 10U PRBC total while in ICU   - s/p PRBC pre-GTUBE with HH increased to 8.0  - c/b rapid decline in HH post GTUBE likely normalizing to baseline 6-7 as RPT CT AP post G TUBE with new bleeds and no acute signs of sickling.   - s/p PRBC 4/22   - Monitor HH     # SCD   - Discontinued deferasirox due to current renal failure   - Monitor Sickle labs QD (CBC with Diff, Retic, BMP, Hepatic Function, LDH and Hapto, and VBG).     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   - Discussed with IR recommend holding IVC filter for now   - Continue on DVT PPX HSQ   - LUE and LLE precautions     # LUE arm infiltration   - HCO3 GTT infiltration in ICU   - Seen by hand sx and no compartment syndrome concern   - Monitor for now     # RUE blood infiltration   - RUE blood transfusion infiltration noted on 4/17 with area of ecchymosis   - RPT DOPPLER with known R IJ DVT, however no upper arm thrombus or issues in area of infiltration  - Monitor site for now    ENDOCRINE   # Glycemic control   - Monitor BG   - No hx of DM2     SKIN   - Wound care reccs appreciated    ETHICS   - FULL CODE     DISPO - vent facility when able 45 YO M with PMHx of sickle cell disease with prior acute chest syndrome requiring transfusion and exchange in the past (last 12/2024), iron overload, gout, HTN, and RUE DVT in 12/2024 on eliquis who presented on 3/15 by his hematologist second to anemia and hypoxia, down to 5.3 from baseline 7-8, with concern for vaso-occlusive crisis. While on medicine, anemia improved post transfusion and continued on SCC pain control PCA. Patient noted with left renal mass during prior admission and s/p L ureteroscopy with biopsy and stent placement on 3/19. Course complicated with hypoglycemia on 3/20 with RRT x 2 and found with severe anemia to 3.4 with hemorrhagic shock second to left perirenal and subcapsular hemorrhage and ultimately PEA arrest. ROSC achieved and transferred to MICU. s/p IR emobilization and course complicated anoxic brain injury, myoclonic jerks, GIB, MATA, HAGMA, DVTs (R IJ and LUE brachial), dysphagia, cirrhosis, and prolonged vent time s/p trach on 4/4. Transferred to [facility] on 4/18.     NEUROLOGY  # Anoxic brain injury   - s/p cardiac arrest on 3/20 with ROSC in 4 minutes   - CT HEAD post arrest with diffuse sulcal effacement with increased intracranial pressure, and few patchy foci of cortical blurring concern for HIE  - MRI brain post arrest with diffuse global abnormal supratentorial cortical restricted diffusion consistent with global hypoxic injury   - Monitor for now    # Seizures vs myoclonic jerks   - Witness myoclonic jerking concerning for seizures with anoxic brain injury  - vEEG with abundant myoclonic seizures in the setting of a severe diffuse/multifocal cerebral dysfunction which can be seen in the setting of sedative effect or due to anoxia  - s/p valium 10 Q4H   - Continue on keppra   - Continue on klonopin 2mg TID   - Continue on dilaudid PRN     # Central fevers   - Persistent fever spikes noted with concern for central fevers   - Bromocriptine started on 4/23, however noted with rise in LFTs and now stopped on 4/27   - Monitor fever curve.     HEENT  # Tongue biting   - Noted with tongue trauma  - s/p bite block replacement by OMFS on 4/23  - s/p dental mouth guard scan on 5/2   - Mouth guard to be delivered on 5/16   - Dental will continue with mouth care and reposition bite block PRN    # Thrush   - On nystatin (4/28 - 5/4)   - Continue with mouth care     CARDIOLOGY  # Shock state likely hemorrhagic vs vasoplegia  - Acute renal bleed noted requiring multiple transfusions and pressors in ICU.   - TTE 3/22 with EF 63 with normal LVRVSF with PASP 49  - Weaned off pressors in ICU and complicated by hypertension.   - Continue on norvasc 5 QD   - Monitor BP     # Autonomic vs pain  - Mild HTN and tachypnea noted likely pain induced vs autonomia?   - Continue on dilaudid pushes PRN    PULMONARY  # Respiratory failure   - Presented with SCC and hypoxia likely from anemia with no signs of acute chest, however noted with PEA with hemorrhagic shock post renal bx requiring intubation.   - Prolonged intubation and s/p 7 PORTEX trach on 4/4   - Continue on vent 20/460/6/30 with nebs and chest PT  - Continue on dilaudid PRN for dyssynchrony     GI  # Shock liver vs cirrhosis with sickle cell   - Transaminitis noted in ICU and thought to be second to shock liver likely second to hemorrhagic shock vs cardiac arrest, however LFTs remains elevated with acute on chronic hyperbilirubinemia.   - US ABD suggestive of early cirrhotic changes, cholecystectomy, and CBD 9mm   - CT AP with cirrhosis   - LFTs rising with bromocriptine   - Holding further bromocriptine   - Trend LFTs and bili     # GIB  - Anemia with GIB noted in ICU   - s/p EGD 4/11 with cauterization of gastric ulcer & hemostatic spraying of three duodenal ulcers. Gastric ulcer likely due to NGT trauma.   - Continue on PPI BID   - Monitor stools and HH     # Oropharyngeal dysphagia   - GI unable to place PEG due to hepatomegaly.   - IR unable to place PEG second to no safe window   - s/p open G TUBE placement on 4/18   - Continue on TF    RENAL  # Acute renal failure and HAGMA second to shock state   - Scr on admission was 1.25 (3/15/25) and increased to 6s while in ICU and s/p HD 3/24-3/29 and then again on 4/4.   - L SHILEY removed on 4/15   - Continue on HCO3 1300 TID tabs with improved acidosis, however hypernatremic and stopped.   - Monitor renal function, acidosis, and UOP     # L renal mass bx c/b hematoma   - Renal bx as below on 3/19  - Hemorrhagic shock and PEA arrest on 3/20   - Renal US with large L renal subcapsular hematoma.    - IR left renal angiogram and embolization on 3/20   - CT 4/6 with unchanged large left subcapsular and perinephric hematoma.  - CT A/P (4/20) showing stable subcapsular renal hematoma and NO new active bleeds.  - Monitor for now    # Urinary retention   - Passed TOV on 4/23   - Urinary retention again and likely neurogenic bladder   - Dave placed 5/1   - Hold further TOVs    # Hypernatremia  - s/p D5W   - Continue on  Q6H   - Monitor sodium     # Hypercalcemia likely from dehydration vs immobility vs hyperparathyroidism?   - PTH on 4/20 elevated   - Rehydrated, however calcium remains stagnant  - Dose calcitonin today   - Start sensipar 30 BID   - Continue on rehydration   - Check RPT PTH and vitamin D     # Hyperphosphatemia   - Phos elevation likely second to renal failure   - PTH elevated, however Phos slowly down trending   - Monitor phos    INFECTIOUS DISEASE   # Central fevers  - Recurrent fevers and complete LVQ empirically as below   - Started on bromocriptine with improvement in fever curve, however noted with transaminitis and bromocriptine stopped with return of elevated temperature intermittently.   - Patient with known R IJ DVT and L kidney hematoma and now new LLE DVT that can cause intermittent fevers.   - Hold work up for now   - Hold further ABX for now   - Monitor fever curve.     # Questionable UTI  - Recurrent fevers with POSITIVE UA and s/p LVQ (4/23 - 4/28)   - Monitor off ABX     # VAP   - Post arrest noted with concern for aspiration and VAP in ICU   - Flu, SCx, BAL, BCx and UCx negative   - MRSA PCR with MSSA and completed bactroban in ICU   - Completed aztreonam (3/22-3/27) then casey (3/27-3/31) and vanco by dose in ICU   - CXR in ICU with RUL consolidation and completed recurrent casey course in ICU (4/2-4/7).     HEMATOLOGIC  # Anemia with SCC vs L kidney bleed vs GIB   - Baseline hemoglobin outpatient ~6.0-7.0 and sent in for anemia down to 5.3 without signs of bleeding and more likely SCC.    - Transfusion given on admission and improved back to baseline.   - Course complicated by hemorrhagic shock from left renal hematoma and HH down to 3.4.  - s/p 10U PRBC total while in ICU   - s/p PRBC pre-GTUBE with HH increased to 8.0  - c/b rapid decline in HH post GTUBE likely normalizing to baseline 6-7 as RPT CT AP post G TUBE with new bleeds and no acute signs of sickling.   - s/p PRBC 4/22   - Monitor HH     # SCD   - Discontinued deferasirox due to current renal failure   - Monitor Sickle labs QD (CBC with Diff, Retic, BMP, Hepatic Function, LDH and Hapto, and VBG).     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   - Discussed with IR recommend holding IVC filter for now   - Continue on DVT PPX HSQ   - LUE and LLE precautions     # LUE arm infiltration   - HCO3 GTT infiltration in ICU   - Seen by hand sx and no compartment syndrome concern   - Monitor for now     # RUE blood infiltration   - RUE blood transfusion infiltration noted on 4/17 with area of ecchymosis   - RPT DOPPLER with known R IJ DVT, however no upper arm thrombus or issues in area of infiltration  - Monitor site for now    ENDOCRINE   # Glycemic control   - Monitor BG   - No hx of DM2     SKIN   - Wound care reccs appreciated    ETHICS   - FULL CODE     DISPO - vent facility when able

## 2025-05-11 NOTE — PROVIDER CONTACT NOTE (CRITICAL VALUE NOTIFICATION) - ASSESSMENT
No acute distress noted at this time
Patient remains on mechanical ventilation AC mode. No s/s of acute bleeding noted. VS are WNL.
no acute distress noted
VBG: Hgb 6.39 Hct 21  / CBC: Hgb 6.8
Pt laying in bed comfortably
Patient is AOx4, no s/s of acute distress, denies any acute changes.
Pt resting comfortably in bed, no s/s of active bleeding
Patient is AOx4, no s/s of acute distress, denies any acute changes.
No s/s of bleeding noted. VSS
Pt AXOX0 on vent h/o Sickle cell anemia, critical lab reported Hgb 5.9 .

## 2025-05-11 NOTE — PROVIDER CONTACT NOTE (CRITICAL VALUE NOTIFICATION) - NS PROVIDER READ BACK
You were seen today for RA  Plan to start mtx 4 pills every 7 days for 2 weeks then go up to 6 pills weekly  Folic acid daily  Start mobic 1-2 pills as needed with food  Blood work in 5 weeks  F/u in 6 weeks
yes

## 2025-05-12 LAB
ALBUMIN SERPL ELPH-MCNC: 2.9 G/DL — LOW (ref 3.3–5)
ALP SERPL-CCNC: 508 U/L — HIGH (ref 40–120)
ALT FLD-CCNC: 54 U/L — HIGH (ref 4–41)
ANION GAP SERPL CALC-SCNC: 12 MMOL/L — SIGNIFICANT CHANGE UP (ref 7–14)
AST SERPL-CCNC: 52 U/L — HIGH (ref 4–40)
BASOPHILS # BLD AUTO: 0.08 K/UL — SIGNIFICANT CHANGE UP (ref 0–0.2)
BASOPHILS NFR BLD AUTO: 0.4 % — SIGNIFICANT CHANGE UP (ref 0–2)
BILIRUB SERPL-MCNC: 2.2 MG/DL — HIGH (ref 0.2–1.2)
BUN SERPL-MCNC: 40 MG/DL — HIGH (ref 7–23)
CA-I BLD-SCNC: 1.41 MMOL/L — HIGH (ref 1.15–1.29)
CALCIUM SERPL-MCNC: 9.9 MG/DL — SIGNIFICANT CHANGE UP (ref 8.4–10.5)
CALCIUM SERPL-MCNC: 9.9 MG/DL — SIGNIFICANT CHANGE UP (ref 8.4–10.5)
CHLORIDE SERPL-SCNC: 113 MMOL/L — HIGH (ref 98–107)
CO2 SERPL-SCNC: 17 MMOL/L — LOW (ref 22–31)
CREAT SERPL-MCNC: 0.82 MG/DL — SIGNIFICANT CHANGE UP (ref 0.5–1.3)
EGFR: 110 ML/MIN/1.73M2 — SIGNIFICANT CHANGE UP
EGFR: 110 ML/MIN/1.73M2 — SIGNIFICANT CHANGE UP
EOSINOPHIL # BLD AUTO: 0.16 K/UL — SIGNIFICANT CHANGE UP (ref 0–0.5)
EOSINOPHIL NFR BLD AUTO: 0.9 % — SIGNIFICANT CHANGE UP (ref 0–6)
GLUCOSE SERPL-MCNC: 137 MG/DL — HIGH (ref 70–99)
HAPTOGLOB SERPL-MCNC: 45 MG/DL — SIGNIFICANT CHANGE UP (ref 34–200)
HCT VFR BLD CALC: 18.8 % — CRITICAL LOW (ref 39–50)
HGB BLD-MCNC: 6.3 G/DL — CRITICAL LOW (ref 13–17)
IANC: 14.43 K/UL — HIGH (ref 1.8–7.4)
IMM GRANULOCYTES NFR BLD AUTO: 0.5 % — SIGNIFICANT CHANGE UP (ref 0–0.9)
LDH SERPL L TO P-CCNC: 197 U/L — SIGNIFICANT CHANGE UP (ref 135–225)
LYMPHOCYTES # BLD AUTO: 1.98 K/UL — SIGNIFICANT CHANGE UP (ref 1–3.3)
LYMPHOCYTES # BLD AUTO: 11.1 % — LOW (ref 13–44)
MAGNESIUM SERPL-MCNC: 1.8 MG/DL — SIGNIFICANT CHANGE UP (ref 1.6–2.6)
MCHC RBC-ENTMCNC: 30.7 PG — SIGNIFICANT CHANGE UP (ref 27–34)
MCHC RBC-ENTMCNC: 33.5 G/DL — SIGNIFICANT CHANGE UP (ref 32–36)
MCV RBC AUTO: 91.7 FL — SIGNIFICANT CHANGE UP (ref 80–100)
MONOCYTES # BLD AUTO: 1.1 K/UL — HIGH (ref 0–0.9)
MONOCYTES NFR BLD AUTO: 6.2 % — SIGNIFICANT CHANGE UP (ref 2–14)
NEUTROPHILS # BLD AUTO: 14.43 K/UL — HIGH (ref 1.8–7.4)
NEUTROPHILS NFR BLD AUTO: 80.9 % — HIGH (ref 43–77)
NRBC # BLD AUTO: 0 K/UL — SIGNIFICANT CHANGE UP (ref 0–0)
NRBC # FLD: 0 K/UL — SIGNIFICANT CHANGE UP (ref 0–0)
NRBC BLD AUTO-RTO: 0 /100 WBCS — SIGNIFICANT CHANGE UP (ref 0–0)
PHOSPHATE SERPL-MCNC: 2.2 MG/DL — LOW (ref 2.5–4.5)
PLATELET # BLD AUTO: 266 K/UL — SIGNIFICANT CHANGE UP (ref 150–400)
POTASSIUM SERPL-MCNC: 3.9 MMOL/L — SIGNIFICANT CHANGE UP (ref 3.5–5.3)
POTASSIUM SERPL-SCNC: 3.9 MMOL/L — SIGNIFICANT CHANGE UP (ref 3.5–5.3)
PROT SERPL-MCNC: 7.7 G/DL — SIGNIFICANT CHANGE UP (ref 6–8.3)
PTH-INTACT FLD-MCNC: 30 PG/ML — SIGNIFICANT CHANGE UP (ref 15–65)
RBC # BLD: 2.05 M/UL — LOW (ref 4.2–5.8)
RBC # BLD: 2.05 M/UL — LOW (ref 4.2–5.8)
RBC # FLD: 17.9 % — HIGH (ref 10.3–14.5)
RETICS #: 134.5 K/UL — HIGH (ref 25–125)
RETICS/RBC NFR: 6.6 % — HIGH (ref 0.5–2.5)
SODIUM SERPL-SCNC: 142 MMOL/L — SIGNIFICANT CHANGE UP (ref 135–145)
WBC # BLD: 17.84 K/UL — HIGH (ref 3.8–10.5)
WBC # FLD AUTO: 17.84 K/UL — HIGH (ref 3.8–10.5)

## 2025-05-12 PROCEDURE — 99233 SBSQ HOSP IP/OBS HIGH 50: CPT

## 2025-05-12 RX ORDER — POTASSIUM PHOSPHATE, MONOBASIC POTASSIUM PHOSPHATE, DIBASIC INJECTION, 236; 224 MG/ML; MG/ML
15 SOLUTION, CONCENTRATE INTRAVENOUS ONCE
Refills: 0 | Status: COMPLETED | OUTPATIENT
Start: 2025-05-12 | End: 2025-05-12

## 2025-05-12 RX ORDER — FUROSEMIDE 10 MG/ML
20 INJECTION INTRAMUSCULAR; INTRAVENOUS ONCE
Refills: 0 | Status: COMPLETED | OUTPATIENT
Start: 2025-05-12 | End: 2025-05-12

## 2025-05-12 RX ADMIN — CINACALCET 30 MILLIGRAM(S): 30 TABLET, FILM COATED ORAL at 17:45

## 2025-05-12 RX ADMIN — Medication 40 MILLIGRAM(S): at 18:54

## 2025-05-12 RX ADMIN — Medication 15 MILLILITER(S): at 21:45

## 2025-05-12 RX ADMIN — FUROSEMIDE 20 MILLIGRAM(S): 10 INJECTION INTRAMUSCULAR; INTRAVENOUS at 10:54

## 2025-05-12 RX ADMIN — CLONAZEPAM 2 MILLIGRAM(S): 0.5 TABLET ORAL at 06:33

## 2025-05-12 RX ADMIN — LEVETIRACETAM 500 MILLIGRAM(S): 10 INJECTION, SOLUTION INTRAVENOUS at 06:33

## 2025-05-12 RX ADMIN — Medication 15 MILLILITER(S): at 14:32

## 2025-05-12 RX ADMIN — POTASSIUM PHOSPHATE, MONOBASIC POTASSIUM PHOSPHATE, DIBASIC INJECTION, 62.5 MILLIMOLE(S): 236; 224 SOLUTION, CONCENTRATE INTRAVENOUS at 11:44

## 2025-05-12 RX ADMIN — Medication 100 MILLILITER(S): at 20:51

## 2025-05-12 RX ADMIN — IPRATROPIUM BROMIDE AND ALBUTEROL SULFATE 3 MILLILITER(S): .5; 2.5 SOLUTION RESPIRATORY (INHALATION) at 09:11

## 2025-05-12 RX ADMIN — IPRATROPIUM BROMIDE AND ALBUTEROL SULFATE 3 MILLILITER(S): .5; 2.5 SOLUTION RESPIRATORY (INHALATION) at 15:14

## 2025-05-12 RX ADMIN — CLONAZEPAM 2 MILLIGRAM(S): 0.5 TABLET ORAL at 21:45

## 2025-05-12 RX ADMIN — HEPARIN SODIUM 5000 UNIT(S): 1000 INJECTION INTRAVENOUS; SUBCUTANEOUS at 06:33

## 2025-05-12 RX ADMIN — Medication 1 APPLICATION(S): at 11:03

## 2025-05-12 RX ADMIN — CINACALCET 30 MILLIGRAM(S): 30 TABLET, FILM COATED ORAL at 06:39

## 2025-05-12 RX ADMIN — AMLODIPINE BESYLATE 5 MILLIGRAM(S): 10 TABLET ORAL at 06:33

## 2025-05-12 RX ADMIN — Medication 40 MILLIGRAM(S): at 06:34

## 2025-05-12 RX ADMIN — HEPARIN SODIUM 5000 UNIT(S): 1000 INJECTION INTRAVENOUS; SUBCUTANEOUS at 17:22

## 2025-05-12 RX ADMIN — Medication 100 MILLILITER(S): at 10:53

## 2025-05-12 RX ADMIN — LEVETIRACETAM 500 MILLIGRAM(S): 10 INJECTION, SOLUTION INTRAVENOUS at 17:23

## 2025-05-12 RX ADMIN — IPRATROPIUM BROMIDE AND ALBUTEROL SULFATE 3 MILLILITER(S): .5; 2.5 SOLUTION RESPIRATORY (INHALATION) at 03:55

## 2025-05-12 RX ADMIN — Medication 15 MILLILITER(S): at 06:33

## 2025-05-12 RX ADMIN — IPRATROPIUM BROMIDE AND ALBUTEROL SULFATE 3 MILLILITER(S): .5; 2.5 SOLUTION RESPIRATORY (INHALATION) at 21:11

## 2025-05-12 RX ADMIN — CLONAZEPAM 2 MILLIGRAM(S): 0.5 TABLET ORAL at 14:28

## 2025-05-12 NOTE — PROGRESS NOTE ADULT - NS ATTEND AMEND GEN_ALL_CORE FT
agree with above  hypercalcemia is improving , confirm trend tomorrow  heme appreciated. no prbc tx  d/c planning

## 2025-05-12 NOTE — CHART NOTE - NSCHARTNOTEFT_GEN_A_CORE
This is a 44 year old male with sickle cell disease and newly diagnosed urothelial carcinoma who presented in acute pain crisis, now planned for discharge.   Hemoglobin is low due to sickle cell disease and has been overall stable, fluctuating slightly 6-7. No need for pRBC transfusion at this time.  Discussed with my attending Dr. Wray.    Liss Chan PA-C  Hematology/Oncology  [facility]  [phone] (office)  [phone] (alt office)

## 2025-05-12 NOTE — CHART NOTE - NSCHARTNOTEFT_GEN_A_CORE
Updated brother Mr. Baal Downey via phone - regarding all medical questions. Brother states he is in discussion w/ sister on which rehab facility (not definitively chosen yet) - wants to talk to SW. Message relayed to CM/SW.

## 2025-05-12 NOTE — PROGRESS NOTE ADULT - SUBJECTIVE AND OBJECTIVE BOX
CHIEF COMPLAINT: Patient is a 44y old  Male who presents with a chief complaint of Referred by Hematologist for low Hg (11 May 2025 09:25)      INTERVAL EVENTS: No acute events overnight.    REVIEW OF SYSTEMS: Seen and evaluated by bedside during AM rounds.      Mode: AC/ CMV (Assist Control/ Continuous Mandatory Ventilation), RR (machine): 20, TV (machine): 460, FiO2: 30, PEEP: 6, ITime: 0.86, MAP: 12, PIP: 20      OBJECTIVE:  ICU Vital Signs Last 24 Hrs  T(C): 36.9 (12 May 2025 04:00), Max: 37.6 (11 May 2025 16:00)  T(F): 98.5 (12 May 2025 04:00), Max: 99.7 (11 May 2025 16:00)  HR: 107 (12 May 2025 04:00) (104 - 125)  BP: 164/84 (12 May 2025 04:00) (137/68 - 164/84)  BP(mean): --  ABP: --  ABP(mean): --  RR: 20 (12 May 2025 04:00) (20 - 24)  SpO2: 100% (12 May 2025 04:00) (100% - 100%)    O2 Parameters below as of 12 May 2025 04:00  Patient On (Oxygen Delivery Method): ventilator    O2 Concentration (%): 30      Mode: AC/ CMV (Assist Control/ Continuous Mandatory Ventilation), RR (machine): 20, TV (machine): 460, FiO2: 30, PEEP: 6, ITime: 0.86, MAP: 12, PIP: 20    05-10 @ : @ 07:00  --------------------------------------------------------  IN: 985 mL / OUT: 1675 mL / NET: -690 mL     @ 07:12 @ 06:49  --------------------------------------------------------  IN: 2970 mL / OUT: 2950 mL / NET: 20 mL      CAPILLARY BLOOD GLUCOSE      POCT Blood Glucose.: 138 mg/dL (11 May 2025 23:43)      HOSPITAL MEDICATIONS:  MEDICATIONS  (STANDING):  albuterol/ipratropium for Nebulization 3 milliLiter(s) Nebulizer every 6 hours  amLODIPine   Tablet 5 milliGRAM(s) Oral daily  Biotene Dry Mouth Oral Rinse 15 milliLiter(s) Swish and Spit every 8 hours  chlorhexidine 2% Cloths 1 Application(s) Topical daily  cinacalcet suspension 30 milliGRAM(s) Oral every 12 hours  clonazePAM  Tablet 2 milliGRAM(s) Oral every 8 hours  heparin   Injectable 5000 Unit(s) SubCutaneous every 12 hours  influenza   Vaccine 0.5 milliLiter(s) IntraMuscular once  levETIRAcetam  Solution 500 milliGRAM(s) Oral two times a day  pantoprazole   Suspension 40 milliGRAM(s) Oral every 12 hours  sodium chloride 0.9%. 1000 milliLiter(s) (100 mL/Hr) IV Continuous <Continuous>    MEDICATIONS  (PRN):  acetaminophen   Oral Liquid .. 650 milliGRAM(s) Oral every 6 hours PRN Temp greater or equal to 38C (100.4F), Mild Pain (1 - 3)  HYDROmorphone  Injectable 0.5 milliGRAM(s) IV Push every 4 hours PRN dysynchronny      PHYSICAL EXAMINATION  General: Alert and cooperative. Resting comfortably. No apparent distress noted. Dry mucous membrane noted. White conjunctiva, no icterus.   HEENT: Normocephalic/atraumatic. EOMI. No discharge, conjunctivitis, or scleral icterus. No ptosis. No discharge or sinus tenderness noted. Mucous membranes are pink without bleeding. Tonsils are not enlarged. Good dental hygiene noted. No facial asymmetry. Neck is supple with a full range of motion. No masses or tenderness. Trachea is midline. Lymph nodes are not enlarged.   Cardiovascular: Normal rate and regular rhythm. No murmur/gallop.   Respiratory: Breath sounds clear and equal bilaterally without rales, wheezing, or rhonchi. No accessory muscles used.   Abdomen: Soft, nontender, nondistended. Bowel sounds are present. No bruit. No hepatosplenomegaly or masses. No rebound or guarding.   Skin: Warm to touch. No rashes, wounds, or skin lesions noted.   Extremities: +2 dorsalis pedis pulse is noted for the lower extremities. Full range of motion on all four extremities. 5/5 motor strength noted in all extremities. No clubbing, cyanosis, edema, or varicose veins.   MSK: No swelling or deformity. Posture, body symmetry, and movements are appropriate.   Neuro: AxO x 3, CN II - XII grossly intact.    LABS:                        6.7    17.14 )-----------( 244      ( 11 May 2025 05:20 )             20.2     05-11    142  |  114[H]  |  51[H]  ----------------------------<  139[H]  3.8   |  17[L]  |  0.98    Ca    10.9[H]      11 May 2025 05:20  Phos  2.8       Mg     2.00     -11    TPro  7.6  /  Alb  3.0[L]  /  TBili  2.1[H]  /  DBili  x   /  AST  70[H]  /  ALT  65[H]  /  AlkPhos  577[H]  05-11      Urinalysis Basic - ( 11 May 2025 05:20 )    Color: x / Appearance: x / SG: x / pH: x  Gluc: 139 mg/dL / Ketone: x  / Bili: x / Urobili: x   Blood: x / Protein: x / Nitrite: x   Leuk Esterase: x / RBC: x / WBC x   Sq Epi: x / Non Sq Epi: x / Bacteria: x            PAST MEDICAL & SURGICAL HISTORY:  Sickle cell anemia      Gout      Deep vein thrombosis (DVT)      Iron overload      Hypertension      History of cholecystectomy      History of removal of Port-a-Cath          FAMILY HISTORY:  Family history of sickle cell trait (Father, Mother)    Patient's father is  (Father)    Patient's mother is  (Mother)        Social History:  Lives alone (15 Mar 2025 09:54)      RADIOLOGY:  [ ] Reviewed and interpreted by me    PULMONARY FUNCTION TESTS:    EKG: CHIEF COMPLAINT: Patient is a 44y old  Male who presents with a chief complaint of Referred by Hematologist for low Hg     INTERVAL EVENTS: No acute events overnight.    REVIEW OF SYSTEMS: Seen and evaluated by bedside during AM rounds. Unable to assess ROS due to poor mentation.     Mode: AC/ CMV (Assist Control/ Continuous Mandatory Ventilation), RR (machine): 20, TV (machine): 460, FiO2: 30, PEEP: 6, ITime: 0.86, MAP: 12, PIP: 20      OBJECTIVE:  ICU Vital Signs Last 24 Hrs  T(C): 36.9 (12 May 2025 04:00), Max: 37.6 (11 May 2025 16:00)  T(F): 98.5 (12 May 2025 04:00), Max: 99.7 (11 May 2025 16:00)  HR: 107 (12 May 2025 04:00) (104 - 125)  BP: 164/84 (12 May 2025 04:00) (137/68 - 164/84)  BP(mean): --  ABP: --  ABP(mean): --  RR: 20 (12 May 2025 04:00) (20 - 24)  SpO2: 100% (12 May 2025 04:00) (100% - 100%)    O2 Parameters below as of 12 May 2025 04:00  Patient On (Oxygen Delivery Method): ventilator    O2 Concentration (%): 30      Mode: AC/ CMV (Assist Control/ Continuous Mandatory Ventilation), RR (machine): 20, TV (machine): 460, FiO2: 30, PEEP: 6, ITime: 0.86, MAP: 12, PIP: 20    05-10 @ : @ 07:00  --------------------------------------------------------  IN: 985 mL / OUT: 1675 mL / NET: -690 mL     @ 07:12 @ 06:49  --------------------------------------------------------  IN: 2970 mL / OUT: 2950 mL / NET: 20 mL      CAPILLARY BLOOD GLUCOSE      POCT Blood Glucose.: 138 mg/dL (11 May 2025 23:43)      HOSPITAL MEDICATIONS:  MEDICATIONS  (STANDING):  albuterol/ipratropium for Nebulization 3 milliLiter(s) Nebulizer every 6 hours  amLODIPine   Tablet 5 milliGRAM(s) Oral daily  Biotene Dry Mouth Oral Rinse 15 milliLiter(s) Swish and Spit every 8 hours  chlorhexidine 2% Cloths 1 Application(s) Topical daily  cinacalcet suspension 30 milliGRAM(s) Oral every 12 hours  clonazePAM  Tablet 2 milliGRAM(s) Oral every 8 hours  heparin   Injectable 5000 Unit(s) SubCutaneous every 12 hours  influenza   Vaccine 0.5 milliLiter(s) IntraMuscular once  levETIRAcetam  Solution 500 milliGRAM(s) Oral two times a day  pantoprazole   Suspension 40 milliGRAM(s) Oral every 12 hours  sodium chloride 0.9%. 1000 milliLiter(s) (100 mL/Hr) IV Continuous <Continuous>    MEDICATIONS  (PRN):  acetaminophen   Oral Liquid .. 650 milliGRAM(s) Oral every 6 hours PRN Temp greater or equal to 38C (100.4F), Mild Pain (1 - 3)  HYDROmorphone  Injectable 0.5 milliGRAM(s) IV Push every 4 hours PRN dysynchronny      PHYSICAL EXAMINATION  General: Resting comfortably in bed. No apparent distress noted.    HEENT: + Bite block in mouth, mouth open. Normocephalic/atraumatic.    Cardiovascular: Tachycardiac and regular rhythm.   Respiratory: + Trach to Vent (AC/VC 20/460/+6/30%). Breath sounds coarse without wheezing. No accessory muscles used.   Abdomen: + Central obesity. + PEG c/d/i. Soft, nontender, nondistended.   Skin: Warm to touch. No rashes, wounds, or skin lesions noted.   Extremities: Unable to assess ROM.    Neuro: Unable to assess. Not following commands. Nonverbal      LABS:                        6.7    17.14 )-----------( 244      ( 11 May 2025 05:20 )             20.2     05-11    142  |  114[H]  |  51[H]  ----------------------------<  139[H]  3.8   |  17[L]  |  0.98    Ca    10.9[H]      11 May 2025 05:20  Phos  2.8       Mg     2.00         TPro  7.6  /  Alb  3.0[L]  /  TBili  2.1[H]  /  DBili  x   /  AST  70[H]  /  ALT  65[H]  /  AlkPhos  577[H]        Urinalysis Basic - ( 11 May 2025 05:20 )    Color: x / Appearance: x / SG: x / pH: x  Gluc: 139 mg/dL / Ketone: x  / Bili: x / Urobili: x   Blood: x / Protein: x / Nitrite: x   Leuk Esterase: x / RBC: x / WBC x   Sq Epi: x / Non Sq Epi: x / Bacteria: x            PAST MEDICAL & SURGICAL HISTORY:  Sickle cell anemia      Gout      Deep vein thrombosis (DVT)      Iron overload      Hypertension      History of cholecystectomy      History of removal of Port-a-Cath          FAMILY HISTORY:  Family history of sickle cell trait (Father, Mother)    Patient's father is  (Father)    Patient's mother is  (Mother)        Social History:  Lives alone (15 Mar 2025 09:54)      RADIOLOGY:  [ ] Reviewed and interpreted by me    PULMONARY FUNCTION TESTS:    EKG:

## 2025-05-12 NOTE — PROGRESS NOTE ADULT - ASSESSMENT
45 YO M with PMHx of sickle cell disease with prior acute chest syndrome requiring transfusion and exchange in the past (last 12/2024), iron overload, gout, HTN, and RUE DVT in 12/2024 on eliquis who presented on 3/15 by his hematologist second to anemia and hypoxia, down to 5.3 from baseline 7-8, with concern for vaso-occlusive crisis. While on medicine, anemia improved post transfusion and continued on SCC pain control PCA. Patient noted with left renal mass during prior admission and s/p L ureteroscopy with biopsy and stent placement on 3/19. Course complicated with hypoglycemia on 3/20 with RRT x 2 and found with severe anemia to 3.4 with hemorrhagic shock second to left perirenal and subcapsular hemorrhage and ultimately PEA arrest. ROSC achieved and transferred to MICU. s/p IR emobilization and course complicated anoxic brain injury, myoclonic jerks, GIB, MATA, HAGMA, DVTs (R IJ and LUE brachial), dysphagia, cirrhosis, and prolonged vent time s/p trach on 4/4. Transferred to [facility] on 4/18.     NEUROLOGY  # Anoxic brain injury   - s/p cardiac arrest on 3/20 with ROSC in 4 minutes   - CT HEAD post arrest with diffuse sulcal effacement with increased intracranial pressure, and few patchy foci of cortical blurring concern for HIE  - MRI brain post arrest with diffuse global abnormal supratentorial cortical restricted diffusion consistent with global hypoxic injury   - Monitor for now    # Seizures vs myoclonic jerks   - Witness myoclonic jerking concerning for seizures with anoxic brain injury  - vEEG with abundant myoclonic seizures in the setting of a severe diffuse/multifocal cerebral dysfunction which can be seen in the setting of sedative effect or due to anoxia  - s/p valium 10 Q4H   - Continue on keppra   - Continue on klonopin 2mg TID   - Continue on dilaudid PRN     # Central fevers   - Persistent fever spikes noted with concern for central fevers   - Bromocriptine started on 4/23, however noted with rise in LFTs and now stopped on 4/27   - Monitor fever curve.     HEENT  # Tongue biting   - Noted with tongue trauma  - s/p bite block replacement by OMFS on 4/23  - s/p dental mouth guard scan on 5/2   - Mouth guard to be delivered on 5/16   - Dental will continue with mouth care and reposition bite block PRN    # Thrush   - On nystatin (4/28 - 5/4)   - Continue with mouth care     CARDIOLOGY  # Shock state likely hemorrhagic vs vasoplegia  - Acute renal bleed noted requiring multiple transfusions and pressors in ICU.   - TTE 3/22 with EF 63 with normal LVRVSF with PASP 49  - Weaned off pressors in ICU and complicated by hypertension.   - Continue on norvasc 5 QD   - Monitor BP     # Autonomic vs pain  - Mild HTN and tachypnea noted likely pain induced vs autonomia?   - Continue on dilaudid pushes PRN    PULMONARY  # Respiratory failure   - Presented with SCC and hypoxia likely from anemia with no signs of acute chest, however noted with PEA with hemorrhagic shock post renal bx requiring intubation.   - Prolonged intubation and s/p 7 PORTEX trach on 4/4   - Continue on vent 20/460/6/30 with nebs and chest PT  - Continue on dilaudid PRN for dyssynchrony     GI  # Shock liver vs cirrhosis with sickle cell   - Transaminitis noted in ICU and thought to be second to shock liver likely second to hemorrhagic shock vs cardiac arrest, however LFTs remains elevated with acute on chronic hyperbilirubinemia.   - US ABD suggestive of early cirrhotic changes, cholecystectomy, and CBD 9mm   - CT AP with cirrhosis   - LFTs rising with bromocriptine   - Holding further bromocriptine   - Trend LFTs and bili     # GIB  - Anemia with GIB noted in ICU   - s/p EGD 4/11 with cauterization of gastric ulcer & hemostatic spraying of three duodenal ulcers. Gastric ulcer likely due to NGT trauma.   - Continue on PPI BID   - Monitor stools and HH     # Oropharyngeal dysphagia   - GI unable to place PEG due to hepatomegaly.   - IR unable to place PEG second to no safe window   - s/p open G TUBE placement on 4/18   - Continue on TF    RENAL  # Acute renal failure and HAGMA second to shock state   - Scr on admission was 1.25 (3/15/25) and increased to 6s while in ICU and s/p HD 3/24-3/29 and then again on 4/4.   - L SHILEY removed on 4/15   - Continue on HCO3 1300 TID tabs with improved acidosis, however hypernatremic and stopped.   - Monitor renal function, acidosis, and UOP     # L renal mass bx c/b hematoma   - Renal bx as below on 3/19  - Hemorrhagic shock and PEA arrest on 3/20   - Renal US with large L renal subcapsular hematoma.    - IR left renal angiogram and embolization on 3/20   - CT 4/6 with unchanged large left subcapsular and perinephric hematoma.  - CT A/P (4/20) showing stable subcapsular renal hematoma and NO new active bleeds.  - Monitor for now    # Urinary retention   - Passed TOV on 4/23   - Urinary retention again and likely neurogenic bladder   - Dave placed 5/1   - Hold further TOVs    # Hypernatremia  - s/p D5W   - Continue on  Q6H   - Monitor sodium     # Hypercalcemia likely from dehydration vs immobility vs hyperparathyroidism?   - PTH on 4/20 elevated   - Rehydrated, however calcium remains stagnant  - Dose calcitonin today   - Start sensipar 30 BID   - Continue on rehydration   - Check RPT PTH and vitamin D     # Hyperphosphatemia   - Phos elevation likely second to renal failure   - PTH elevated, however Phos slowly down trending   - Monitor phos    INFECTIOUS DISEASE   # Central fevers  - Recurrent fevers and complete LVQ empirically as below   - Started on bromocriptine with improvement in fever curve, however noted with transaminitis and bromocriptine stopped with return of elevated temperature intermittently.   - Patient with known R IJ DVT and L kidney hematoma and now new LLE DVT that can cause intermittent fevers.   - Hold work up for now   - Hold further ABX for now   - Monitor fever curve.     # Questionable UTI  - Recurrent fevers with POSITIVE UA and s/p LVQ (4/23 - 4/28)   - Monitor off ABX     # VAP   - Post arrest noted with concern for aspiration and VAP in ICU   - Flu, SCx, BAL, BCx and UCx negative   - MRSA PCR with MSSA and completed bactroban in ICU   - Completed aztreonam (3/22-3/27) then casey (3/27-3/31) and vanco by dose in ICU   - CXR in ICU with RUL consolidation and completed recurrent casey course in ICU (4/2-4/7).     HEMATOLOGIC  # Anemia with SCC vs L kidney bleed vs GIB   - Baseline hemoglobin outpatient ~6.0-7.0 and sent in for anemia down to 5.3 without signs of bleeding and more likely SCC.    - Transfusion given on admission and improved back to baseline.   - Course complicated by hemorrhagic shock from left renal hematoma and HH down to 3.4.  - s/p 10U PRBC total while in ICU   - s/p PRBC pre-GTUBE with HH increased to 8.0  - c/b rapid decline in HH post GTUBE likely normalizing to baseline 6-7 as RPT CT AP post G TUBE with new bleeds and no acute signs of sickling.   - s/p PRBC 4/22   - Monitor HH     # SCD   - Discontinued deferasirox due to current renal failure   - Monitor Sickle labs QD (CBC with Diff, Retic, BMP, Hepatic Function, LDH and Hapto, and VBG).     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   - Discussed with IR recommend holding IVC filter for now   - Continue on DVT PPX HSQ   - LUE and LLE precautions     # LUE arm infiltration   - HCO3 GTT infiltration in ICU   - Seen by hand sx and no compartment syndrome concern   - Monitor for now     # RUE blood infiltration   - RUE blood transfusion infiltration noted on 4/17 with area of ecchymosis   - RPT DOPPLER with known R IJ DVT, however no upper arm thrombus or issues in area of infiltration  - Monitor site for now    ENDOCRINE   # Glycemic control   - Monitor BG   - No hx of DM2     SKIN   - Wound care reccs appreciated    ETHICS   - FULL CODE     DISPO - vent facility when able 45 YO M with PMHx of sickle cell disease with prior acute chest syndrome requiring transfusion and exchange in the past (last 12/2024), iron overload, gout, HTN, and RUE DVT in 12/2024 on eliquis who presented on 3/15 by his hematologist second to anemia and hypoxia, down to 5.3 from baseline 7-8, with concern for vaso-occlusive crisis. While on medicine, anemia improved post transfusion and continued on SCC pain control PCA. Patient noted with left renal mass during prior admission and s/p L ureteroscopy with biopsy and stent placement on 3/19. Course complicated with hypoglycemia on 3/20 with RRT x 2 and found with severe anemia to 3.4 with hemorrhagic shock second to left perirenal and subcapsular hemorrhage and ultimately PEA arrest. ROSC achieved and transferred to MICU. s/p IR emobilization and course complicated anoxic brain injury, myoclonic jerks, GIB, MATA, HAGMA, DVTs (R IJ and LUE brachial), dysphagia, cirrhosis, and prolonged vent time s/p trach on 4/4. Transferred to [facility] on 4/18.     NEUROLOGY  # Anoxic brain injury   - s/p cardiac arrest on 3/20 with ROSC in 4 minutes   - CT HEAD post arrest with diffuse sulcal effacement with increased intracranial pressure, and few patchy foci of cortical blurring concern for HIE  - MRI brain post arrest with diffuse global abnormal supratentorial cortical restricted diffusion consistent with global hypoxic injury   - Monitor for now    # Seizures vs myoclonic jerks   - Witness myoclonic jerking concerning for seizures with anoxic brain injury  - vEEG with abundant myoclonic seizures in the setting of a severe diffuse/multifocal cerebral dysfunction which can be seen in the setting of sedative effect or due to anoxia  - s/p valium 10 Q4H   - Continue on keppra   - Continue on klonopin 2mg TID   - Continue on dilaudid PRN     # Central fevers   - Persistent fever spikes noted with concern for central fevers   - Bromocriptine started on 4/23, however noted with rise in LFTs and now stopped on 4/27   - Monitor fever curve.     HEENT  # Tongue biting   - Noted with tongue trauma  - s/p bite block replacement by OMFS on 4/23  - s/p dental mouth guard scan on 5/2   - Mouth guard to be delivered on 5/16   - Dental will continue with mouth care and reposition bite block PRN    # Thrush   - On nystatin (4/28 - 5/4)   - Continue with mouth care     CARDIOLOGY  # Shock state likely hemorrhagic vs vasoplegia  - Acute renal bleed noted requiring multiple transfusions and pressors in ICU.   - TTE 3/22 with EF 63 with normal LVRVSF with PASP 49  - Weaned off pressors in ICU and complicated by hypertension.   - Continue on norvasc 5 QD   - Monitor BP     # Autonomic vs pain  - Mild HTN and tachypnea noted likely pain induced vs autonomia?   - Continue on dilaudid pushes PRN    PULMONARY  # Respiratory failure   - Presented with SCC and hypoxia likely from anemia with no signs of acute chest, however noted with PEA with hemorrhagic shock post renal bx requiring intubation.   - Prolonged intubation and s/p 7 PORTEX trach on 4/4   - Continue on vent 20/460/6/30 with nebs and chest PT  - Continue on dilaudid PRN for dyssynchrony     GI  # Shock liver vs cirrhosis with sickle cell   - Transaminitis noted in ICU and thought to be second to shock liver likely second to hemorrhagic shock vs cardiac arrest, however LFTs remains elevated with acute on chronic hyperbilirubinemia.   - US ABD suggestive of early cirrhotic changes, cholecystectomy, and CBD 9mm   - CT AP with cirrhosis   - LFTs rising with bromocriptine   - Holding further bromocriptine   - Trend LFTs and bili     # GIB  - Anemia with GIB noted in ICU   - s/p EGD 4/11 with cauterization of gastric ulcer & hemostatic spraying of three duodenal ulcers. Gastric ulcer likely due to NGT trauma.   - Continue on PPI BID   - Monitor stools and HH     # Oropharyngeal dysphagia   - GI unable to place PEG due to hepatomegaly.   - IR unable to place PEG second to no safe window   - s/p open G TUBE placement on 4/18   - Continue on TF    RENAL  # Acute renal failure and HAGMA second to shock state   - Scr on admission was 1.25 (3/15/25) and increased to 6s while in ICU and s/p HD 3/24-3/29 and then again on 4/4.   - L SHILEY removed on 4/15   - Continue on HCO3 1300 TID tabs with improved acidosis, however hypernatremic and stopped.   - Monitor renal function, acidosis, and UOP     # L renal mass bx c/b hematoma   - Renal bx as below on 3/19  - Hemorrhagic shock and PEA arrest on 3/20   - Renal US with large L renal subcapsular hematoma.    - IR left renal angiogram and embolization on 3/20   - CT 4/6 with unchanged large left subcapsular and perinephric hematoma.  - CT A/P (4/20) showing stable subcapsular renal hematoma and NO new active bleeds.  - Monitor for now    # Urinary retention   - Passed TOV on 4/23   - Urinary retention again and likely neurogenic bladder   - Dave placed 5/1   - Hold further TOVs    # Hypernatremia  - s/p D5W   - Continue on  Q6H   - Monitor sodium     # Hypercalcemia likely from dehydration vs immobility vs hyperparathyroidism?   - PTH on 4/20 elevated   - Rehydrated, however calcium remains stagnant  - s/p Dose calcitonin (5/11)  - Started sensipar 30 BID (5/11)  - Continue on rehydration   - PTH (5/12): 30  - Check vitamin D     # Hyperphosphatemia   - Phos elevation likely second to renal failure   - PTH elevated, however Phos slowly down trending   - Monitor phos    INFECTIOUS DISEASE   # Central fevers  - Recurrent fevers and complete LVQ empirically as below   - Started on bromocriptine with improvement in fever curve, however noted with transaminitis and bromocriptine stopped with return of elevated temperature intermittently.   - Patient with known R IJ DVT and L kidney hematoma and now new LLE DVT that can cause intermittent fevers.   - Hold work up for now   - Hold further ABX for now   - Monitor fever curve.     # Questionable UTI  - Recurrent fevers with POSITIVE UA and s/p LVQ (4/23 - 4/28)   - Monitor off ABX     # VAP   - Post arrest noted with concern for aspiration and VAP in ICU   - Flu, SCx, BAL, BCx and UCx negative   - MRSA PCR with MSSA and completed bactroban in ICU   - Completed aztreonam (3/22-3/27) then casey (3/27-3/31) and vanco by dose in ICU   - CXR in ICU with RUL consolidation and completed recurrent casey course in ICU (4/2-4/7).     HEMATOLOGIC  # Anemia with SCC vs L kidney bleed vs GIB   - Baseline hemoglobin outpatient ~6.0-7.0 and sent in for anemia down to 5.3 without signs of bleeding and more likely SCC.    - Transfusion given on admission and improved back to baseline.   - Course complicated by hemorrhagic shock from left renal hematoma and HH down to 3.4.  - s/p 10U PRBC total while in ICU   - s/p PRBC pre-GTUBE with HH increased to 8.0  - c/b rapid decline in HH post GTUBE likely normalizing to baseline 6-7 as RPT CT AP post G TUBE with new bleeds and no acute signs of sickling.   - s/p PRBC 4/22   - Monitor HH     # SCD   - Discontinued deferasirox due to current renal failure   - Monitor Sickle labs QD (CBC with Diff, Retic, BMP, Hepatic Function, LDH and Hapto, and VBG).   - No need for unit of [facility] prior to discharge as per heme onc (note written by heme onc 5/12)    ONCOLOGY   # L renal mass with non-invasive urothelial carcinoma  - Biopsy showing non-invasive urothelial carcinoma   - Case discussed with ONC and given HIE, physical debilitation, and vent dependence patient is not a candidate for DMT.     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   - Discussed with IR recommend holding IVC filter for now   - Continue on DVT PPX HSQ   - LUE and LLE precautions     # LUE arm infiltration   - HCO3 GTT infiltration in ICU   - Seen by hand sx and no compartment syndrome concern   - Monitor for now     # RUE blood infiltration   - RUE blood transfusion infiltration noted on 4/17 with area of ecchymosis   - RPT DOPPLER with known R IJ DVT, however no upper arm thrombus or issues in area of infiltration  - Monitor site for now    ENDOCRINE   # Glycemic control   - Monitor BG   - No hx of DM2     SKIN   - Wound care reccs appreciated    ETHICS   - FULL CODE     DISPO - vent facility when able 43 YO M with PMHx of sickle cell disease with prior acute chest syndrome requiring transfusion and exchange in the past (last 12/2024), iron overload, gout, HTN, and RUE DVT in 12/2024 on eliquis who presented on 3/15 by his hematologist second to anemia and hypoxia, down to 5.3 from baseline 7-8, with concern for vaso-occlusive crisis. While on medicine, anemia improved post transfusion and continued on SCC pain control PCA. Patient noted with left renal mass during prior admission and s/p L ureteroscopy with biopsy and stent placement on 3/19. Course complicated with hypoglycemia on 3/20 with RRT x 2 and found with severe anemia to 3.4 with hemorrhagic shock second to left perirenal and subcapsular hemorrhage and ultimately PEA arrest. ROSC achieved and transferred to MICU. s/p IR emobilization and course complicated anoxic brain injury, myoclonic jerks, GIB, MATA, HAGMA, DVTs (R IJ and LUE brachial), dysphagia, cirrhosis, and prolonged vent time s/p trach on 4/4. Transferred to [facility] on 4/18.     NEUROLOGY  # Anoxic brain injury   - s/p cardiac arrest on 3/20 with ROSC in 4 minutes   - CT HEAD post arrest with diffuse sulcal effacement with increased intracranial pressure, and few patchy foci of cortical blurring concern for HIE  - MRI brain post arrest with diffuse global abnormal supratentorial cortical restricted diffusion consistent with global hypoxic injury   - Monitor for now    # Seizures vs myoclonic jerks   - Witness myoclonic jerking concerning for seizures with anoxic brain injury  - vEEG with abundant myoclonic seizures in the setting of a severe diffuse/multifocal cerebral dysfunction which can be seen in the setting of sedative effect or due to anoxia  - s/p valium 10 Q4H   - Continue on keppra   - Continue on klonopin 2mg TID   - Continue on dilaudid PRN     # Central fevers   - Persistent fever spikes noted with concern for central fevers   - Bromocriptine started on 4/23, however noted with rise in LFTs and now stopped on 4/27   - Monitor fever curve.     HEENT  # Tongue biting   - Noted with tongue trauma  - s/p bite block replacement by OMFS on 4/23  - s/p dental mouth guard scan on 5/2   - Mouth guard to be delivered on 5/16   - Dental will continue with mouth care and reposition bite block PRN    # Thrush   - On nystatin (4/28 - 5/4)   - Continue with mouth care     CARDIOLOGY  # Shock state likely hemorrhagic vs vasoplegia  - Acute renal bleed noted requiring multiple transfusions and pressors in ICU.   - TTE 3/22 with EF 63 with normal LVRVSF with PASP 49  - Weaned off pressors in ICU and complicated by hypertension.   - Continue on norvasc 5 QD   - Monitor BP     # Autonomic vs pain  - Mild HTN and tachypnea noted likely pain induced vs autonomia?   - Continue on dilaudid pushes PRN    PULMONARY  # Respiratory failure   - Presented with SCC and hypoxia likely from anemia with no signs of acute chest, however noted with PEA with hemorrhagic shock post renal bx requiring intubation.   - Prolonged intubation and s/p 7 PORTEX trach on 4/4   - Continue on vent 20/460/6/30 with nebs and chest PT  - Continue on dilaudid PRN for dyssynchrony     GI  # Shock liver vs cirrhosis with sickle cell   - Transaminitis noted in ICU and thought to be second to shock liver likely second to hemorrhagic shock vs cardiac arrest, however LFTs remains elevated with acute on chronic hyperbilirubinemia.   - US ABD suggestive of early cirrhotic changes, cholecystectomy, and CBD 9mm   - CT AP with cirrhosis   - LFTs rising with bromocriptine   - Holding further bromocriptine   - Trend LFTs and bili     # GIB  - Anemia with GIB noted in ICU   - s/p EGD 4/11 with cauterization of gastric ulcer & hemostatic spraying of three duodenal ulcers. Gastric ulcer likely due to NGT trauma.   - Continue on PPI BID   - Monitor stools and HH     # Oropharyngeal dysphagia   - GI unable to place PEG due to hepatomegaly.   - IR unable to place PEG second to no safe window   - s/p open G TUBE placement on 4/18   - Continue on TF    RENAL  # Acute renal failure and HAGMA second to shock state   - Scr on admission was 1.25 (3/15/25) and increased to 6s while in ICU and s/p HD 3/24-3/29 and then again on 4/4.   - L SHILEY removed on 4/15   - Continue on HCO3 1300 TID tabs with improved acidosis, however hypernatremic and stopped.   - Monitor renal function, acidosis, and UOP     # L renal mass bx c/b hematoma   - Renal bx as below on 3/19  - Hemorrhagic shock and PEA arrest on 3/20   - Renal US with large L renal subcapsular hematoma.    - IR left renal angiogram and embolization on 3/20   - CT 4/6 with unchanged large left subcapsular and perinephric hematoma.  - CT A/P (4/20) showing stable subcapsular renal hematoma and NO new active bleeds.  - Monitor for now    # Urinary retention   - Passed TOV on 4/23   - Urinary retention again and likely neurogenic bladder   - Dave placed 5/1   - Hold further TOVs    # Hypernatremia  - s/p D5W   - Continue on  Q6H   - Monitor sodium     # Hypercalcemia likely from dehydration vs immobility vs hyperparathyroidism?   - PTH on 4/20 elevated   - Rehydrated, however calcium remains stagnant  - s/p Dose calcitonin (5/11)  - Started sensipar 30 BID (5/11)  - Continue on rehydration   - PTH (5/12): 30  - Check vitamin D   - IVF given w/ lasix 20 x 1 (5/12) for hypercalcemia corrected calcium 10.8    # Hyperphosphatemia   - Phos elevation likely second to renal failure   - PTH elevated, however Phos slowly down trending   - Monitor phos    INFECTIOUS DISEASE   # Central fevers  - Recurrent fevers and complete LVQ empirically as below   - Started on bromocriptine with improvement in fever curve, however noted with transaminitis and bromocriptine stopped with return of elevated temperature intermittently.   - Patient with known R IJ DVT and L kidney hematoma and now new LLE DVT that can cause intermittent fevers.   - Hold work up for now   - Hold further ABX for now   - Monitor fever curve.     # Questionable UTI  - Recurrent fevers with POSITIVE UA and s/p LVQ (4/23 - 4/28)   - Monitor off ABX     # VAP   - Post arrest noted with concern for aspiration and VAP in ICU   - Flu, SCx, BAL, BCx and UCx negative   - MRSA PCR with MSSA and completed bactroban in ICU   - Completed aztreonam (3/22-3/27) then casey (3/27-3/31) and vanco by dose in ICU   - CXR in ICU with RUL consolidation and completed recurrent casey course in ICU (4/2-4/7).     HEMATOLOGIC  # Anemia with SCC vs L kidney bleed vs GIB   - Baseline hemoglobin outpatient ~6.0-7.0 and sent in for anemia down to 5.3 without signs of bleeding and more likely SCC.    - Transfusion given on admission and improved back to baseline.   - Course complicated by hemorrhagic shock from left renal hematoma and HH down to 3.4.  - s/p 10U PRBC total while in ICU   - s/p PRBC pre-GTUBE with HH increased to 8.0  - c/b rapid decline in HH post GTUBE likely normalizing to baseline 6-7 as RPT CT AP post G TUBE with new bleeds and no acute signs of sickling.   - s/p PRBC 4/22   - Monitor HH     # SCD   - Discontinued deferasirox due to current renal failure   - Monitor Sickle labs QD (CBC with Diff, Retic, BMP, Hepatic Function, LDH and Hapto, and VBG).   - No need for unit of [facility] prior to discharge as per heme onc (note written by heme onc 5/12)    ONCOLOGY   # L renal mass with non-invasive urothelial carcinoma  - Biopsy showing non-invasive urothelial carcinoma   - Case discussed with ONC and given HIE, physical debilitation, and vent dependence patient is not a candidate for DMT.     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   - Discussed with IR recommend holding IVC filter for now   - Continue on DVT PPX HSQ   - LUE and LLE precautions     # LUE arm infiltration   - HCO3 GTT infiltration in ICU   - Seen by hand sx and no compartment syndrome concern   - Monitor for now     # RUE blood infiltration   - RUE blood transfusion infiltration noted on 4/17 with area of ecchymosis   - RPT DOPPLER with known R IJ DVT, however no upper arm thrombus or issues in area of infiltration  - Monitor site for now    ENDOCRINE   # Glycemic control   - Monitor BG   - No hx of DM2     SKIN   - Wound care reccs appreciated    ETHICS   - FULL CODE     DISPO - vent facility when able 45 YO M with PMHx of sickle cell disease with prior acute chest syndrome requiring transfusion and exchange in the past (last 12/2024), iron overload, gout, HTN, and RUE DVT in 12/2024 on eliquis who presented on 3/15 by his hematologist second to anemia and hypoxia, down to 5.3 from baseline 7-8, with concern for vaso-occlusive crisis. While on medicine, anemia improved post transfusion and continued on SCC pain control PCA. Patient noted with left renal mass during prior admission and s/p L ureteroscopy with biopsy and stent placement on 3/19. Course complicated with hypoglycemia on 3/20 with RRT x 2 and found with severe anemia to 3.4 with hemorrhagic shock second to left perirenal and subcapsular hemorrhage and ultimately PEA arrest. ROSC achieved and transferred to MICU. s/p IR emobilization and course complicated anoxic brain injury, myoclonic jerks, GIB, MATA, HAGMA, DVTs (R IJ and LUE brachial), dysphagia, cirrhosis, and prolonged vent time s/p trach on 4/4. Transferred to [facility] on 4/18.     NEUROLOGY  # Anoxic brain injury   - s/p cardiac arrest on 3/20 with ROSC in 4 minutes   - CT HEAD post arrest with diffuse sulcal effacement with increased intracranial pressure, and few patchy foci of cortical blurring concern for HIE  - MRI brain post arrest with diffuse global abnormal supratentorial cortical restricted diffusion consistent with global hypoxic injury   - Monitor for now    # Seizures vs myoclonic jerks   - Witness myoclonic jerking concerning for seizures with anoxic brain injury  - vEEG with abundant myoclonic seizures in the setting of a severe diffuse/multifocal cerebral dysfunction which can be seen in the setting of sedative effect or due to anoxia  - s/p valium 10 Q4H   - Continue on keppra   - Continue on klonopin 2mg TID   - Continue on dilaudid PRN     # Central fevers   - Persistent fever spikes noted with concern for central fevers   - Bromocriptine started on 4/23, however noted with rise in LFTs and now stopped on 4/27   - Monitor fever curve.     HEENT  # Tongue biting   - Noted with tongue trauma  - s/p bite block replacement by OMFS on 4/23  - s/p dental mouth guard scan on 5/2   - Mouth guard to be delivered on 5/16   - Dental will continue with mouth care and reposition bite block PRN    # Thrush   - On nystatin (4/28 - 5/4)   - Continue with mouth care     CARDIOLOGY  # Shock state likely hemorrhagic vs vasoplegia  - Acute renal bleed noted requiring multiple transfusions and pressors in ICU.   - TTE 3/22 with EF 63 with normal LVRVSF with PASP 49  - Weaned off pressors in ICU and complicated by hypertension.   - Continue on norvasc 5 QD   - Monitor BP     # Autonomic vs pain  - Mild HTN and tachypnea noted likely pain induced vs autonomia?   - Continue on dilaudid pushes PRN    PULMONARY  # Respiratory failure   - Presented with SCC and hypoxia likely from anemia with no signs of acute chest, however noted with PEA with hemorrhagic shock post renal bx requiring intubation.   - Prolonged intubation and s/p 7 PORTEX trach on 4/4   - Continue on vent 20/460/6/30 with nebs and chest PT  - Continue on dilaudid PRN for dyssynchrony     GI  # Shock liver vs cirrhosis with sickle cell   - Transaminitis noted in ICU and thought to be second to shock liver likely second to hemorrhagic shock vs cardiac arrest, however LFTs remains elevated with acute on chronic hyperbilirubinemia.   - US ABD suggestive of early cirrhotic changes, cholecystectomy, and CBD 9mm   - CT AP with cirrhosis   - LFTs rising with bromocriptine   - Holding further bromocriptine   - Trend LFTs and bili     # GIB  - Anemia with GIB noted in ICU   - s/p EGD 4/11 with cauterization of gastric ulcer & hemostatic spraying of three duodenal ulcers. Gastric ulcer likely due to NGT trauma.   - Continue on PPI BID   - Monitor stools and HH     # Oropharyngeal dysphagia   - GI unable to place PEG due to hepatomegaly.   - IR unable to place PEG second to no safe window   - s/p open G TUBE placement on 4/18   - Continue on TF    RENAL  # Acute renal failure and HAGMA second to shock state   - Scr on admission was 1.25 (3/15/25) and increased to 6s while in ICU and s/p HD 3/24-3/29 and then again on 4/4.   - L SHILEY removed on 4/15   - Continue on HCO3 1300 TID tabs with improved acidosis, however hypernatremic and stopped.   - Monitor renal function, acidosis, and UOP     # L renal mass bx c/b hematoma   - Renal bx as below on 3/19  - Hemorrhagic shock and PEA arrest on 3/20   - Renal US with large L renal subcapsular hematoma.    - IR left renal angiogram and embolization on 3/20   - CT 4/6 with unchanged large left subcapsular and perinephric hematoma.  - CT A/P (4/20) showing stable subcapsular renal hematoma and NO new active bleeds.  - Monitor for now    # Urinary retention   - Passed TOV on 4/23   - Urinary retention again and likely neurogenic bladder   - Dave placed 5/1   - Hold further TOVs    # Hypernatremia  - s/p D5W   - Continue on  Q6H   - Monitor sodium     # Hypercalcemia likely from dehydration vs immobility vs hyperparathyroidism?   - PTH on 4/20 elevated   - Rehydrated, however calcium remains stagnant  - s/p Dose calcitonin (5/11)  - Started sensipar 30 BID (5/11)  - Continue on rehydration   - PTH (5/12): 30  - Check vitamin D   - IVF given w/ lasix 20 x 1 for hypercalcemia corrected calcium 10.8. ionized calcium 1.41 (5/12)    # Hyperphosphatemia   - Phos elevation likely second to renal failure   - PTH elevated, however Phos slowly down trending   - Monitor phos    INFECTIOUS DISEASE   # Central fevers  - Recurrent fevers and complete LVQ empirically as below   - Started on bromocriptine with improvement in fever curve, however noted with transaminitis and bromocriptine stopped with return of elevated temperature intermittently.   - Patient with known R IJ DVT and L kidney hematoma and now new LLE DVT that can cause intermittent fevers.   - Hold work up for now   - Hold further ABX for now   - Monitor fever curve.     # Questionable UTI  - Recurrent fevers with POSITIVE UA and s/p LVQ (4/23 - 4/28)   - Monitor off ABX     # VAP   - Post arrest noted with concern for aspiration and VAP in ICU   - Flu, SCx, BAL, BCx and UCx negative   - MRSA PCR with MSSA and completed bactroban in ICU   - Completed aztreonam (3/22-3/27) then casey (3/27-3/31) and vanco by dose in ICU   - CXR in ICU with RUL consolidation and completed recurrent casey course in ICU (4/2-4/7).     HEMATOLOGIC  # Anemia with SCC vs L kidney bleed vs GIB   - Baseline hemoglobin outpatient ~6.0-7.0 and sent in for anemia down to 5.3 without signs of bleeding and more likely SCC.    - Transfusion given on admission and improved back to baseline.   - Course complicated by hemorrhagic shock from left renal hematoma and HH down to 3.4.  - s/p 10U PRBC total while in ICU   - s/p PRBC pre-GTUBE with HH increased to 8.0  - c/b rapid decline in HH post GTUBE likely normalizing to baseline 6-7 as RPT CT AP post G TUBE with new bleeds and no acute signs of sickling.   - s/p PRBC 4/22   - Monitor HH     # SCD   - Discontinued deferasirox due to current renal failure   - Monitor Sickle labs QD (CBC with Diff, Retic, BMP, Hepatic Function, LDH and Hapto, and VBG).   - No need for unit of [facility] prior to discharge as per heme onc (note written by heme onc 5/12)    ONCOLOGY   # L renal mass with non-invasive urothelial carcinoma  - Biopsy showing non-invasive urothelial carcinoma   - Case discussed with ONC and given HIE, physical debilitation, and vent dependence patient is not a candidate for DMT.     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   - Discussed with IR recommend holding IVC filter for now   - Continue on DVT PPX HSQ   - LUE and LLE precautions     # LUE arm infiltration   - HCO3 GTT infiltration in ICU   - Seen by hand sx and no compartment syndrome concern   - Monitor for now     # RUE blood infiltration   - RUE blood transfusion infiltration noted on 4/17 with area of ecchymosis   - RPT DOPPLER with known R IJ DVT, however no upper arm thrombus or issues in area of infiltration  - Monitor site for now    ENDOCRINE   # Glycemic control   - Monitor BG   - No hx of DM2     SKIN   - Wound care reccs appreciated    ETHICS   - FULL CODE     DISPO - vent facility when able 43 YO M with PMHx of sickle cell disease with prior acute chest syndrome requiring transfusion and exchange in the past (last 12/2024), iron overload, gout, HTN, and RUE DVT in 12/2024 on eliquis who presented on 3/15 by his hematologist second to anemia and hypoxia, down to 5.3 from baseline 7-8, with concern for vaso-occlusive crisis. While on medicine, anemia improved post transfusion and continued on SCC pain control PCA. Patient noted with left renal mass during prior admission and s/p L ureteroscopy with biopsy and stent placement on 3/19. Course complicated with hypoglycemia on 3/20 with RRT x 2 and found with severe anemia to 3.4 with hemorrhagic shock second to left perirenal and subcapsular hemorrhage and ultimately PEA arrest. ROSC achieved and transferred to MICU. s/p IR emobilization and course complicated anoxic brain injury, myoclonic jerks, GIB, MATA, HAGMA, DVTs (R IJ and LUE brachial), dysphagia, cirrhosis, and prolonged vent time s/p trach on 4/4. Transferred to [facility] on 4/18.     NEUROLOGY  # Anoxic brain injury   - s/p cardiac arrest on 3/20 with ROSC in 4 minutes   - CT HEAD post arrest with diffuse sulcal effacement with increased intracranial pressure, and few patchy foci of cortical blurring concern for HIE  - MRI brain post arrest with diffuse global abnormal supratentorial cortical restricted diffusion consistent with global hypoxic injury   - Monitor for now    # Seizures vs myoclonic jerks   - Witness myoclonic jerking concerning for seizures with anoxic brain injury  - vEEG with abundant myoclonic seizures in the setting of a severe diffuse/multifocal cerebral dysfunction which can be seen in the setting of sedative effect or due to anoxia  - s/p valium 10 Q4H   - Continue on keppra   - Continue on klonopin 2mg TID   - Continue on dilaudid PRN     # Central fevers   - Persistent fever spikes noted with concern for central fevers   - Bromocriptine started on 4/23, however noted with rise in LFTs and now stopped on 4/27   - Monitor fever curve.     HEENT  # Tongue biting   - Noted with tongue trauma  - s/p bite block replacement by OMFS on 4/23  - s/p dental mouth guard scan on 5/2   - Mouth guard to be delivered on 5/16   - Dental will continue with mouth care and reposition bite block PRN    # Thrush   - On nystatin (4/28 - 5/4)   - Continue with mouth care     CARDIOLOGY  # Shock state likely hemorrhagic vs vasoplegia  - Acute renal bleed noted requiring multiple transfusions and pressors in ICU.   - TTE 3/22 with EF 63 with normal LVRVSF with PASP 49  - Weaned off pressors in ICU and complicated by hypertension.   - Continue on norvasc 5 QD   - Monitor BP     # Autonomic vs pain  - Mild HTN and tachypnea noted likely pain induced vs autonomia?   - Continue on dilaudid pushes PRN    PULMONARY  # Respiratory failure   - Presented with SCC and hypoxia likely from anemia with no signs of acute chest, however noted with PEA with hemorrhagic shock post renal bx requiring intubation.   - Prolonged intubation and s/p 7 PORTEX trach on 4/4   - Continue on vent 20/460/6/30 with nebs and chest PT  - Continue on dilaudid PRN for dyssynchrony     GI  # Shock liver vs cirrhosis with sickle cell   - Transaminitis noted in ICU and thought to be second to shock liver likely second to hemorrhagic shock vs cardiac arrest, however LFTs remains elevated with acute on chronic hyperbilirubinemia.   - US ABD suggestive of early cirrhotic changes, cholecystectomy, and CBD 9mm   - CT AP with cirrhosis   - LFTs rising with bromocriptine   - Holding further bromocriptine   - Trend LFTs and bili     # GIB  - Anemia with GIB noted in ICU   - s/p EGD 4/11 with cauterization of gastric ulcer & hemostatic spraying of three duodenal ulcers. Gastric ulcer likely due to NGT trauma.   - Continue on PPI BID   - Monitor stools and HH     # Oropharyngeal dysphagia   - GI unable to place PEG due to hepatomegaly.   - IR unable to place PEG second to no safe window   - s/p open G TUBE placement on 4/18   - Continue on TF    RENAL  # Acute renal failure and HAGMA second to shock state   - Scr on admission was 1.25 (3/15/25) and increased to 6s while in ICU and s/p HD 3/24-3/29 and then again on 4/4.   - L SHILEY removed on 4/15   - Continue on HCO3 1300 TID tabs with improved acidosis, however hypernatremic and stopped.   - Monitor renal function, acidosis, and UOP     # L renal mass bx c/b hematoma   - Renal bx as below on 3/19  - Hemorrhagic shock and PEA arrest on 3/20   - Renal US with large L renal subcapsular hematoma.    - IR left renal angiogram and embolization on 3/20   - CT 4/6 with unchanged large left subcapsular and perinephric hematoma.  - CT A/P (4/20) showing stable subcapsular renal hematoma and NO new active bleeds.  - Monitor for now    # Urinary retention   - Passed TOV on 4/23   - Urinary retention again and likely neurogenic bladder   - Dave placed 5/1   - Hold further TOVs    # Hypernatremia  - s/p D5W   - Continue on  Q6H   - Monitor sodium     # Hypercalcemia likely from dehydration vs immobility vs hyperparathyroidism?   - PTH on 4/20 elevated   - Rehydrated, however calcium remains stagnant  - s/p Dose calcitonin (5/11)  - Started sensipar 30 BID (5/11)  - Continue on rehydration   - PTH (5/12): 30  - pending vitamin D (sent 5/12)  - IVF given w/ lasix 20 x 1 for hypercalcemia corrected calcium 10.8. ionized calcium 1.41 (5/12)    # Hyperphosphatemia   - Phos elevation likely second to renal failure   - PTH elevated, however Phos slowly down trending   - Monitor phos    INFECTIOUS DISEASE   # Central fevers  - Recurrent fevers and complete LVQ empirically as below   - Started on bromocriptine with improvement in fever curve, however noted with transaminitis and bromocriptine stopped with return of elevated temperature intermittently.   - Patient with known R IJ DVT and L kidney hematoma and now new LLE DVT that can cause intermittent fevers.   - Hold work up for now   - Hold further ABX for now   - Monitor fever curve.     # Questionable UTI  - Recurrent fevers with POSITIVE UA and s/p LVQ (4/23 - 4/28)   - Monitor off ABX     # VAP   - Post arrest noted with concern for aspiration and VAP in ICU   - Flu, SCx, BAL, BCx and UCx negative   - MRSA PCR with MSSA and completed bactroban in ICU   - Completed aztreonam (3/22-3/27) then casey (3/27-3/31) and vanco by dose in ICU   - CXR in ICU with RUL consolidation and completed recurrent casey course in ICU (4/2-4/7).     HEMATOLOGIC  # Anemia with SCC vs L kidney bleed vs GIB   - Baseline hemoglobin outpatient ~6.0-7.0 and sent in for anemia down to 5.3 without signs of bleeding and more likely SCC.    - Transfusion given on admission and improved back to baseline.   - Course complicated by hemorrhagic shock from left renal hematoma and HH down to 3.4.  - s/p 10U PRBC total while in ICU   - s/p PRBC pre-GTUBE with HH increased to 8.0  - c/b rapid decline in HH post GTUBE likely normalizing to baseline 6-7 as RPT CT AP post G TUBE with new bleeds and no acute signs of sickling.   - s/p PRBC 4/22   - Monitor HH     # SCD   - Discontinued deferasirox due to current renal failure   - Monitor Sickle labs QD (CBC with Diff, Retic, BMP, Hepatic Function, LDH and Hapto, and VBG).   - No need for unit of [facility] prior to discharge as per heme onc (note written by heme onc 5/12)    ONCOLOGY   # L renal mass with non-invasive urothelial carcinoma  - Biopsy showing non-invasive urothelial carcinoma   - Case discussed with ONC and given HIE, physical debilitation, and vent dependence patient is not a candidate for DMT.     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   - Discussed with IR recommend holding IVC filter for now   - Continue on DVT PPX HSQ   - LUE and LLE precautions     # LUE arm infiltration   - HCO3 GTT infiltration in ICU   - Seen by hand sx and no compartment syndrome concern   - Monitor for now     # RUE blood infiltration   - RUE blood transfusion infiltration noted on 4/17 with area of ecchymosis   - RPT DOPPLER with known R IJ DVT, however no upper arm thrombus or issues in area of infiltration  - Monitor site for now    ENDOCRINE   # Glycemic control   - Monitor BG   - No hx of DM2     SKIN   - Wound care reccs appreciated    ETHICS   - FULL CODE     DISPO - vent facility when able

## 2025-05-13 LAB
ALBUMIN SERPL ELPH-MCNC: 2.9 G/DL — LOW (ref 3.3–5)
ALP SERPL-CCNC: 479 U/L — HIGH (ref 40–120)
ALT FLD-CCNC: 54 U/L — HIGH (ref 4–41)
ANION GAP SERPL CALC-SCNC: 11 MMOL/L — SIGNIFICANT CHANGE UP (ref 7–14)
AST SERPL-CCNC: 57 U/L — HIGH (ref 4–40)
BASOPHILS # BLD AUTO: 0.07 K/UL — SIGNIFICANT CHANGE UP (ref 0–0.2)
BASOPHILS NFR BLD AUTO: 0.4 % — SIGNIFICANT CHANGE UP (ref 0–2)
BILIRUB SERPL-MCNC: 1.9 MG/DL — HIGH (ref 0.2–1.2)
BLD GP AB SCN SERPL QL: NEGATIVE — SIGNIFICANT CHANGE UP
BUN SERPL-MCNC: 36 MG/DL — HIGH (ref 7–23)
CALCIUM SERPL-MCNC: 9.4 MG/DL — SIGNIFICANT CHANGE UP (ref 8.4–10.5)
CHLORIDE SERPL-SCNC: 113 MMOL/L — HIGH (ref 98–107)
CO2 SERPL-SCNC: 17 MMOL/L — LOW (ref 22–31)
CREAT SERPL-MCNC: 0.71 MG/DL — SIGNIFICANT CHANGE UP (ref 0.5–1.3)
EGFR: 115 ML/MIN/1.73M2 — SIGNIFICANT CHANGE UP
EGFR: 115 ML/MIN/1.73M2 — SIGNIFICANT CHANGE UP
EOSINOPHIL # BLD AUTO: 0.4 K/UL — SIGNIFICANT CHANGE UP (ref 0–0.5)
EOSINOPHIL NFR BLD AUTO: 2.5 % — SIGNIFICANT CHANGE UP (ref 0–6)
GLUCOSE SERPL-MCNC: 113 MG/DL — HIGH (ref 70–99)
HAPTOGLOB SERPL-MCNC: 48 MG/DL — SIGNIFICANT CHANGE UP (ref 34–200)
HCT VFR BLD CALC: 18.2 % — CRITICAL LOW (ref 39–50)
HCT VFR BLD CALC: 21.8 % — LOW (ref 39–50)
HGB BLD-MCNC: 5.9 G/DL — CRITICAL LOW (ref 13–17)
HGB BLD-MCNC: 7.3 G/DL — LOW (ref 13–17)
IANC: 12.26 K/UL — HIGH (ref 1.8–7.4)
IMM GRANULOCYTES NFR BLD AUTO: 0.5 % — SIGNIFICANT CHANGE UP (ref 0–0.9)
LDH SERPL L TO P-CCNC: 203 U/L — SIGNIFICANT CHANGE UP (ref 135–225)
LYMPHOCYTES # BLD AUTO: 13.5 % — SIGNIFICANT CHANGE UP (ref 13–44)
LYMPHOCYTES # BLD AUTO: 2.17 K/UL — SIGNIFICANT CHANGE UP (ref 1–3.3)
MAGNESIUM SERPL-MCNC: 1.6 MG/DL — SIGNIFICANT CHANGE UP (ref 1.6–2.6)
MCHC RBC-ENTMCNC: 30.6 PG — SIGNIFICANT CHANGE UP (ref 27–34)
MCHC RBC-ENTMCNC: 30.7 PG — SIGNIFICANT CHANGE UP (ref 27–34)
MCHC RBC-ENTMCNC: 32.4 G/DL — SIGNIFICANT CHANGE UP (ref 32–36)
MCHC RBC-ENTMCNC: 33.5 G/DL — SIGNIFICANT CHANGE UP (ref 32–36)
MCV RBC AUTO: 91.6 FL — SIGNIFICANT CHANGE UP (ref 80–100)
MCV RBC AUTO: 94.3 FL — SIGNIFICANT CHANGE UP (ref 80–100)
MONOCYTES # BLD AUTO: 1.05 K/UL — HIGH (ref 0–0.9)
MONOCYTES NFR BLD AUTO: 6.6 % — SIGNIFICANT CHANGE UP (ref 2–14)
NEUTROPHILS # BLD AUTO: 12.26 K/UL — HIGH (ref 1.8–7.4)
NEUTROPHILS NFR BLD AUTO: 76.5 % — SIGNIFICANT CHANGE UP (ref 43–77)
NRBC # BLD AUTO: 0 K/UL — SIGNIFICANT CHANGE UP (ref 0–0)
NRBC # BLD AUTO: 0 K/UL — SIGNIFICANT CHANGE UP (ref 0–0)
NRBC # FLD: 0 K/UL — SIGNIFICANT CHANGE UP (ref 0–0)
NRBC # FLD: 0 K/UL — SIGNIFICANT CHANGE UP (ref 0–0)
NRBC BLD AUTO-RTO: 0 /100 WBCS — SIGNIFICANT CHANGE UP (ref 0–0)
NRBC BLD AUTO-RTO: 0 /100 WBCS — SIGNIFICANT CHANGE UP (ref 0–0)
PHOSPHATE SERPL-MCNC: 2.6 MG/DL — SIGNIFICANT CHANGE UP (ref 2.5–4.5)
PLATELET # BLD AUTO: 247 K/UL — SIGNIFICANT CHANGE UP (ref 150–400)
PLATELET # BLD AUTO: 269 K/UL — SIGNIFICANT CHANGE UP (ref 150–400)
POTASSIUM SERPL-MCNC: 3.7 MMOL/L — SIGNIFICANT CHANGE UP (ref 3.5–5.3)
POTASSIUM SERPL-SCNC: 3.7 MMOL/L — SIGNIFICANT CHANGE UP (ref 3.5–5.3)
PROT SERPL-MCNC: 7.3 G/DL — SIGNIFICANT CHANGE UP (ref 6–8.3)
RBC # BLD: 1.93 M/UL — LOW (ref 4.2–5.8)
RBC # BLD: 1.93 M/UL — LOW (ref 4.2–5.8)
RBC # BLD: 2.38 M/UL — LOW (ref 4.2–5.8)
RBC # FLD: 17.1 % — HIGH (ref 10.3–14.5)
RBC # FLD: 17.8 % — HIGH (ref 10.3–14.5)
RETICS #: 117.9 K/UL — SIGNIFICANT CHANGE UP (ref 25–125)
RETICS/RBC NFR: 6.1 % — HIGH (ref 0.5–2.5)
RH IG SCN BLD-IMP: POSITIVE — SIGNIFICANT CHANGE UP
SODIUM SERPL-SCNC: 141 MMOL/L — SIGNIFICANT CHANGE UP (ref 135–145)
VIT D25+D1,25 OH+D1,25 PNL SERPL-MCNC: 6.7 PG/ML — LOW (ref 19.9–79.3)
WBC # BLD: 15.73 K/UL — HIGH (ref 3.8–10.5)
WBC # BLD: 16.03 K/UL — HIGH (ref 3.8–10.5)
WBC # FLD AUTO: 15.73 K/UL — HIGH (ref 3.8–10.5)
WBC # FLD AUTO: 16.03 K/UL — HIGH (ref 3.8–10.5)

## 2025-05-13 PROCEDURE — 99233 SBSQ HOSP IP/OBS HIGH 50: CPT

## 2025-05-13 RX ORDER — AMLODIPINE BESYLATE 10 MG/1
1 TABLET ORAL
Qty: 0 | Refills: 0 | DISCHARGE
Start: 2025-05-13

## 2025-05-13 RX ORDER — IPRATROPIUM BROMIDE AND ALBUTEROL SULFATE .5; 2.5 MG/3ML; MG/3ML
3 SOLUTION RESPIRATORY (INHALATION)
Qty: 0 | Refills: 0 | DISCHARGE
Start: 2025-05-13

## 2025-05-13 RX ORDER — CINACALCET 30 MG/1
30 TABLET, FILM COATED ORAL
Qty: 0 | Refills: 0 | DISCHARGE
Start: 2025-05-13

## 2025-05-13 RX ORDER — NIFEDIPINE 30 MG
1 TABLET, EXTENDED RELEASE 24 HR ORAL
Refills: 0 | DISCHARGE

## 2025-05-13 RX ORDER — ACETAMINOPHEN 500 MG/5ML
20.31 LIQUID (ML) ORAL
Qty: 0 | Refills: 0 | DISCHARGE
Start: 2025-05-13

## 2025-05-13 RX ORDER — LEVETIRACETAM 10 MG/ML
5 INJECTION, SOLUTION INTRAVENOUS
Qty: 0 | Refills: 0 | DISCHARGE
Start: 2025-05-13

## 2025-05-13 RX ORDER — CLONAZEPAM 0.5 MG/1
1 TABLET ORAL
Qty: 0 | Refills: 0 | DISCHARGE
Start: 2025-05-13

## 2025-05-13 RX ADMIN — Medication 15 MILLILITER(S): at 13:08

## 2025-05-13 RX ADMIN — Medication 40 MILLIGRAM(S): at 06:01

## 2025-05-13 RX ADMIN — CLONAZEPAM 2 MILLIGRAM(S): 0.5 TABLET ORAL at 13:08

## 2025-05-13 RX ADMIN — IPRATROPIUM BROMIDE AND ALBUTEROL SULFATE 3 MILLILITER(S): .5; 2.5 SOLUTION RESPIRATORY (INHALATION) at 15:29

## 2025-05-13 RX ADMIN — CLONAZEPAM 2 MILLIGRAM(S): 0.5 TABLET ORAL at 06:00

## 2025-05-13 RX ADMIN — Medication 15 MILLILITER(S): at 06:01

## 2025-05-13 RX ADMIN — Medication 1 APPLICATION(S): at 13:08

## 2025-05-13 RX ADMIN — LEVETIRACETAM 500 MILLIGRAM(S): 10 INJECTION, SOLUTION INTRAVENOUS at 17:14

## 2025-05-13 RX ADMIN — IPRATROPIUM BROMIDE AND ALBUTEROL SULFATE 3 MILLILITER(S): .5; 2.5 SOLUTION RESPIRATORY (INHALATION) at 08:31

## 2025-05-13 RX ADMIN — HEPARIN SODIUM 5000 UNIT(S): 1000 INJECTION INTRAVENOUS; SUBCUTANEOUS at 06:01

## 2025-05-13 RX ADMIN — LEVETIRACETAM 500 MILLIGRAM(S): 10 INJECTION, SOLUTION INTRAVENOUS at 06:01

## 2025-05-13 RX ADMIN — CINACALCET 30 MILLIGRAM(S): 30 TABLET, FILM COATED ORAL at 17:14

## 2025-05-13 RX ADMIN — AMLODIPINE BESYLATE 5 MILLIGRAM(S): 10 TABLET ORAL at 06:01

## 2025-05-13 RX ADMIN — CINACALCET 30 MILLIGRAM(S): 30 TABLET, FILM COATED ORAL at 06:01

## 2025-05-13 RX ADMIN — IPRATROPIUM BROMIDE AND ALBUTEROL SULFATE 3 MILLILITER(S): .5; 2.5 SOLUTION RESPIRATORY (INHALATION) at 03:26

## 2025-05-13 RX ADMIN — IPRATROPIUM BROMIDE AND ALBUTEROL SULFATE 3 MILLILITER(S): .5; 2.5 SOLUTION RESPIRATORY (INHALATION) at 21:07

## 2025-05-13 RX ADMIN — Medication 40 MILLIGRAM(S): at 17:14

## 2025-05-13 RX ADMIN — HEPARIN SODIUM 5000 UNIT(S): 1000 INJECTION INTRAVENOUS; SUBCUTANEOUS at 17:14

## 2025-05-13 NOTE — PROVIDER CONTACT NOTE (CRITICAL VALUE NOTIFICATION) - ACTION/TREATMENT ORDERED:
no new orders at this time
Provider aware
no new orders at this time, continue to monitor
No new orders given
Provider notified
no new orders at this time. as per provider patients baseline is between 6-7
no new orders given at this time
1 unit of blood
Per provider no action taken at this time because pt results are in range for diagnosis.
Provider Notified/ No new orders
Provider notified
One unit of RBC ordered. Repeat CBC after  transfusion
Redraw CBC

## 2025-05-13 NOTE — PROGRESS NOTE ADULT - SUBJECTIVE AND OBJECTIVE BOX
CHIEF COMPLAINT:    Patient is a 45y old  Male who presents with a chief complaint of Referred by Hematologist for low Hg (12 May 2025 06:49)      INTERVAL EVENTS:     ROS: Seen by bedside during AM rounds     OBJECTIVE:  ICU Vital Signs Last 24 Hrs  T(C): 37 (13 May 2025 04:00), Max: 37.3 (12 May 2025 16:00)  T(F): 98.6 (13 May 2025 04:00), Max: 99.1 (12 May 2025 16:00)  HR: 101 (13 May 2025 04:00) (99 - 119)  BP: 151/81 (13 May 2025 04:00) (142/76 - 169/78)  BP(mean): --  ABP: --  ABP(mean): --  RR: 25 (13 May 2025 04:00) (20 - 27)  SpO2: 100% (13 May 2025 04:00) (99% - 100%)    O2 Parameters below as of 13 May 2025 04:00  Patient On (Oxygen Delivery Method): ventilator    O2 Concentration (%): 30      Mode: AC/ CMV (Assist Control/ Continuous Mandatory Ventilation), RR (machine): 20, TV (machine): 460, FiO2: 30, PEEP: 6, ITime: 0.84, MAP: 11, PIP: 21    05-12 @ 07:01  -  05-13 @ 07:00  --------------------------------------------------------  IN: 5520 mL / OUT: 3450 mL / NET: 2070 mL      CAPILLARY BLOOD GLUCOSE      POCT Blood Glucose.: 138 mg/dL (11 May 2025 23:43)      PHYSICAL EXAM:  General:   HEENT:   Lymph Nodes:  Neck:   Respiratory:   Cardiovascular:   Abdomen:   Extremities:   Skin:   Neurological:  Psychiatry:    Mode: AC/ CMV (Assist Control/ Continuous Mandatory Ventilation)  RR (machine): 20  TV (machine): 460  FiO2: 30  PEEP: 6  ITime: 0.84  MAP: 11  PIP: 21      HOSPITAL MEDICATIONS:  MEDICATIONS  (STANDING):  albuterol/ipratropium for Nebulization 3 milliLiter(s) Nebulizer every 6 hours  amLODIPine   Tablet 5 milliGRAM(s) Oral daily  Biotene Dry Mouth Oral Rinse 15 milliLiter(s) Swish and Spit every 8 hours  chlorhexidine 2% Cloths 1 Application(s) Topical daily  cinacalcet suspension 30 milliGRAM(s) Oral every 12 hours  clonazePAM  Tablet 2 milliGRAM(s) Oral every 8 hours  heparin   Injectable 5000 Unit(s) SubCutaneous every 12 hours  influenza   Vaccine 0.5 milliLiter(s) IntraMuscular once  levETIRAcetam  Solution 500 milliGRAM(s) Oral two times a day  pantoprazole   Suspension 40 milliGRAM(s) Oral every 12 hours  sodium chloride 0.9%. 1000 milliLiter(s) (100 mL/Hr) IV Continuous <Continuous>    MEDICATIONS  (PRN):  acetaminophen   Oral Liquid .. 650 milliGRAM(s) Oral every 6 hours PRN Temp greater or equal to 38C (100.4F), Mild Pain (1 - 3)  HYDROmorphone  Injectable 0.5 milliGRAM(s) IV Push every 4 hours PRN dysynchronny      LABS:                        5.9    16.03 )-----------( 269      ( 13 May 2025 05:46 )             18.2     05-13    141  |  113[H]  |  36[H]  ----------------------------<  113[H]  3.7   |  17[L]  |  0.71    Ca    9.4      13 May 2025 05:46  Phos  2.6     05-13  Mg     1.60     05-13    TPro  7.3  /  Alb  2.9[L]  /  TBili  1.9[H]  /  DBili  x   /  AST  57[H]  /  ALT  54[H]  /  AlkPhos  479[H]  05-13      Urinalysis Basic - ( 13 May 2025 05:46 )    Color: x / Appearance: x / SG: x / pH: x  Gluc: 113 mg/dL / Ketone: x  / Bili: x / Urobili: x   Blood: x / Protein: x / Nitrite: x   Leuk Esterase: x / RBC: x / WBC x   Sq Epi: x / Non Sq Epi: x / Bacteria: x         CHIEF COMPLAINT:    Patient is a 45y old  Male who presents with a chief complaint of Referred by Hematologist for low Hg (12 May 2025 06:49)      INTERVAL EVENTS:     ROS: Seen by bedside during AM rounds     OBJECTIVE:  ICU Vital Signs Last 24 Hrs  T(C): 37 (13 May 2025 04:00), Max: 37.3 (12 May 2025 16:00)  T(F): 98.6 (13 May 2025 04:00), Max: 99.1 (12 May 2025 16:00)  HR: 101 (13 May 2025 04:00) (99 - 119)  BP: 151/81 (13 May 2025 04:00) (142/76 - 169/78)  BP(mean): --  ABP: --  ABP(mean): --  RR: 25 (13 May 2025 04:00) (20 - 27)  SpO2: 100% (13 May 2025 04:00) (99% - 100%)    O2 Parameters below as of 13 May 2025 04:00  Patient On (Oxygen Delivery Method): ventilator    O2 Concentration (%): 30      Mode: AC/ CMV (Assist Control/ Continuous Mandatory Ventilation), RR (machine): 20, TV (machine): 460, FiO2: 30, PEEP: 6, ITime: 0.84, MAP: 11, PIP: 21    05-12 @ 07:01  -  05-13 @ 07:00  --------------------------------------------------------  IN: 5520 mL / OUT: 3450 mL / NET: 2070 mL      CAPILLARY BLOOD GLUCOSE      POCT Blood Glucose.: 138 mg/dL (11 May 2025 23:43)      PHYSICAL EXAM:  General: lying in bed, NAD  HEENT: AT/NC, (+) scleral icterus, no nasal discharge, mucous membranes pin, moist, bite block in place  Neck: supple, (-) JVD  Respiratory: Lungs CTA b/l, trach>vent minimal respiratory effort on ventilator  Cardiovascular:(+) sinus trachycardia, S1, S2  RRR no MRG  Abdomen: soft, nontender, nondistended, BS + x4 quadrants, PEG tube in place stoma c/d/i  Extremities:   Skin:   Neurological:  Psychiatry:    Mode: AC/ CMV (Assist Control/ Continuous Mandatory Ventilation)  RR (machine): 20  TV (machine): 460  FiO2: 30  PEEP: 6  ITime: 0.84  MAP: 11  PIP: 21      HOSPITAL MEDICATIONS:  MEDICATIONS  (STANDING):  albuterol/ipratropium for Nebulization 3 milliLiter(s) Nebulizer every 6 hours  amLODIPine   Tablet 5 milliGRAM(s) Oral daily  Biotene Dry Mouth Oral Rinse 15 milliLiter(s) Swish and Spit every 8 hours  chlorhexidine 2% Cloths 1 Application(s) Topical daily  cinacalcet suspension 30 milliGRAM(s) Oral every 12 hours  clonazePAM  Tablet 2 milliGRAM(s) Oral every 8 hours  heparin   Injectable 5000 Unit(s) SubCutaneous every 12 hours  influenza   Vaccine 0.5 milliLiter(s) IntraMuscular once  levETIRAcetam  Solution 500 milliGRAM(s) Oral two times a day  pantoprazole   Suspension 40 milliGRAM(s) Oral every 12 hours  sodium chloride 0.9%. 1000 milliLiter(s) (100 mL/Hr) IV Continuous <Continuous>    MEDICATIONS  (PRN):  acetaminophen   Oral Liquid .. 650 milliGRAM(s) Oral every 6 hours PRN Temp greater or equal to 38C (100.4F), Mild Pain (1 - 3)  HYDROmorphone  Injectable 0.5 milliGRAM(s) IV Push every 4 hours PRN dysynchronny      LABS:                        5.9    16.03 )-----------( 269      ( 13 May 2025 05:46 )             18.2     05-13    141  |  113[H]  |  36[H]  ----------------------------<  113[H]  3.7   |  17[L]  |  0.71    Ca    9.4      13 May 2025 05:46  Phos  2.6     05-13  Mg     1.60     05-13    TPro  7.3  /  Alb  2.9[L]  /  TBili  1.9[H]  /  DBili  x   /  AST  57[H]  /  ALT  54[H]  /  AlkPhos  479[H]  05-13      Urinalysis Basic - ( 13 May 2025 05:46 )    Color: x / Appearance: x / SG: x / pH: x  Gluc: 113 mg/dL / Ketone: x  / Bili: x / Urobili: x   Blood: x / Protein: x / Nitrite: x   Leuk Esterase: x / RBC: x / WBC x   Sq Epi: x / Non Sq Epi: x / Bacteria: x         CHIEF COMPLAINT:    Patient is a 45y old  Male who presents with a chief complaint of Referred by Hematologist for low Hg (12 May 2025 06:49)      INTERVAL EVENTS:     Hgb 5.9 ---> 1u PRBCs    ROS: Seen by bedside during AM rounds     OBJECTIVE:  ICU Vital Signs Last 24 Hrs  T(C): 37 (13 May 2025 04:00), Max: 37.3 (12 May 2025 16:00)  T(F): 98.6 (13 May 2025 04:00), Max: 99.1 (12 May 2025 16:00)  HR: 101 (13 May 2025 04:00) (99 - 119)  BP: 151/81 (13 May 2025 04:00) (142/76 - 169/78)  BP(mean): --  ABP: --  ABP(mean): --  RR: 25 (13 May 2025 04:00) (20 - 27)  SpO2: 100% (13 May 2025 04:00) (99% - 100%)    O2 Parameters below as of 13 May 2025 04:00  Patient On (Oxygen Delivery Method): ventilator    O2 Concentration (%): 30      Mode: AC/ CMV (Assist Control/ Continuous Mandatory Ventilation), RR (machine): 20, TV (machine): 460, FiO2: 30, PEEP: 6, ITime: 0.84, MAP: 11, PIP: 21    05-12 @ 07:01  -  05-13 @ 07:00  --------------------------------------------------------  IN: 5520 mL / OUT: 3450 mL / NET: 2070 mL      CAPILLARY BLOOD GLUCOSE      POCT Blood Glucose.: 138 mg/dL (11 May 2025 23:43)      PHYSICAL EXAM:  General: lying in bed, NAD  HEENT: AT/NC, (+) scleral icterus, no nasal discharge, mucous membranes pin, moist, bite block in place  Neck: supple, (-) JVD  Respiratory: Lungs CTA b/l, trach>vent minimal respiratory effort on ventilator  Cardiovascular:(+) sinus trachycardia, S1, S2  RRR no MRG  Abdomen: soft, nontender, nondistended, BS + x4 quadrants, PEG tube in place stoma c/d/i  Extremities: (+) nonpitting, RUE edema, distal peripheral pulses +x4  Skin:  No visible skin abnormalities  Neurological:  (+) opens eyes spontaneously, (-) withdraws to noxious stimuli in RUE only, (-) purposeful movement  Psychiatry: nonverbal, does not attend    Mode: AC/ CMV (Assist Control/ Continuous Mandatory Ventilation)  RR (machine): 20  TV (machine): 460  FiO2: 30  PEEP: 6  ITime: 0.84  MAP: 11  PIP: 21      HOSPITAL MEDICATIONS:  MEDICATIONS  (STANDING):  albuterol/ipratropium for Nebulization 3 milliLiter(s) Nebulizer every 6 hours  amLODIPine   Tablet 5 milliGRAM(s) Oral daily  Biotene Dry Mouth Oral Rinse 15 milliLiter(s) Swish and Spit every 8 hours  chlorhexidine 2% Cloths 1 Application(s) Topical daily  cinacalcet suspension 30 milliGRAM(s) Oral every 12 hours  clonazePAM  Tablet 2 milliGRAM(s) Oral every 8 hours  heparin   Injectable 5000 Unit(s) SubCutaneous every 12 hours  influenza   Vaccine 0.5 milliLiter(s) IntraMuscular once  levETIRAcetam  Solution 500 milliGRAM(s) Oral two times a day  pantoprazole   Suspension 40 milliGRAM(s) Oral every 12 hours  sodium chloride 0.9%. 1000 milliLiter(s) (100 mL/Hr) IV Continuous <Continuous>    MEDICATIONS  (PRN):  acetaminophen   Oral Liquid .. 650 milliGRAM(s) Oral every 6 hours PRN Temp greater or equal to 38C (100.4F), Mild Pain (1 - 3)  HYDROmorphone  Injectable 0.5 milliGRAM(s) IV Push every 4 hours PRN dysynchronny      LABS:                        5.9    16.03 )-----------( 269      ( 13 May 2025 05:46 )             18.2     05-13    141  |  113[H]  |  36[H]  ----------------------------<  113[H]  3.7   |  17[L]  |  0.71    Ca    9.4      13 May 2025 05:46  Phos  2.6     05-13  Mg     1.60     05-13    TPro  7.3  /  Alb  2.9[L]  /  TBili  1.9[H]  /  DBili  x   /  AST  57[H]  /  ALT  54[H]  /  AlkPhos  479[H]  05-13      Urinalysis Basic - ( 13 May 2025 05:46 )    Color: x / Appearance: x / SG: x / pH: x  Gluc: 113 mg/dL / Ketone: x  / Bili: x / Urobili: x   Blood: x / Protein: x / Nitrite: x   Leuk Esterase: x / RBC: x / WBC x   Sq Epi: x / Non Sq Epi: x / Bacteria: x

## 2025-05-13 NOTE — PROVIDER CONTACT NOTE (CRITICAL VALUE NOTIFICATION) - BACKGROUND
sickle cell anemia
Hx of sickle cell disease and anemia
Hx of anemia
Anemia; HTN; Sickle cell anemia;
Sickle cell anemia
43y/o M, admitted for anemia. PMH of sickle cell disease, prior DVT
Admitted for Anemia
45y/o M, admitted for anemia. PMH of sickle cell disease, prior DVT
HTN, sickle cell anemia
PMH: HTN, sickle cell anemia
hx Anemia   iron overload  Sickle Cell Anemia
Pt has PMH SIckle Cell
HTN, sickle cell anemia

## 2025-05-13 NOTE — PROVIDER CONTACT NOTE (CRITICAL VALUE NOTIFICATION) - SITUATION
HGB 7 and HCT 20.9
Hgb:5.9 , Hematocrit;18.2
LR
critical lab value
Hemoglobin 5.9  Hematocrit 18.2
hemoglobin 5.9, hematocrit 16.8
Critical lab value
Critical lab value
VBG: Hgb 6.39 Hct 21  / CBC: Hgb 6.8
critical lab result
Hemoglobin 7.0
Hemoglobin 7.0. Pt had 1 unit of PRBC given today
Received a call regarding Hgb 6.7 and Hct 20.2
hematocrit 19.3

## 2025-05-13 NOTE — PROGRESS NOTE ADULT - ASSESSMENT
43 YO M with PMHx of sickle cell disease with prior acute chest syndrome requiring transfusion and exchange in the past (last 12/2024), iron overload, gout, HTN, and RUE DVT in 12/2024 on eliquis who presented on 3/15 by his hematologist second to anemia and hypoxia, down to 5.3 from baseline 7-8, with concern for vaso-occlusive crisis. While on medicine, anemia improved post transfusion and continued on SCC pain control PCA. Patient noted with left renal mass during prior admission and s/p L ureteroscopy with biopsy and stent placement on 3/19. Course complicated with hypoglycemia on 3/20 with RRT x 2 and found with severe anemia to 3.4 with hemorrhagic shock second to left perirenal and subcapsular hemorrhage and ultimately PEA arrest. ROSC achieved and transferred to MICU. s/p IR emobilization and course complicated anoxic brain injury, myoclonic jerks, GIB, MATA, HAGMA, DVTs (R IJ and LUE brachial), dysphagia, cirrhosis, and prolonged vent time s/p trach on 4/4. Transferred to [facility] on 4/18.     NEUROLOGY  # Anoxic brain injury   - s/p cardiac arrest on 3/20 with ROSC in 4 minutes   - CT HEAD post arrest with diffuse sulcal effacement with increased intracranial pressure, and few patchy foci of cortical blurring concern for HIE  - MRI brain post arrest with diffuse global abnormal supratentorial cortical restricted diffusion consistent with global hypoxic injury   - Monitor for now    # Seizures vs myoclonic jerks   - Witness myoclonic jerking concerning for seizures with anoxic brain injury  - vEEG with abundant myoclonic seizures in the setting of a severe diffuse/multifocal cerebral dysfunction which can be seen in the setting of sedative effect or due to anoxia  - s/p valium 10 Q4H   - Continue on keppra   - Continue on klonopin 2mg TID   - Continue on dilaudid PRN     # Central fevers   - Persistent fever spikes noted with concern for central fevers   - Bromocriptine started on 4/23, however noted with rise in LFTs and now stopped on 4/27   - Monitor fever curve.     HEENT  # Tongue biting   - Noted with tongue trauma  - s/p bite block replacement by OMFS on 4/23  - s/p dental mouth guard scan on 5/2   - Mouth guard to be delivered on 5/16   - Dental will continue with mouth care and reposition bite block PRN    # Thrush   - On nystatin (4/28 - 5/4)   - Continue with mouth care     CARDIOLOGY  # Shock state likely hemorrhagic vs vasoplegia  - Acute renal bleed noted requiring multiple transfusions and pressors in ICU.   - TTE 3/22 with EF 63 with normal LVRVSF with PASP 49  - Weaned off pressors in ICU and complicated by hypertension.   - Continue on norvasc 5 QD   - Monitor BP     # Autonomic vs pain  - Mild HTN and tachypnea noted likely pain induced vs autonomia?   - Continue on dilaudid pushes PRN    PULMONARY  # Respiratory failure   - Presented with SCC and hypoxia likely from anemia with no signs of acute chest, however noted with PEA with hemorrhagic shock post renal bx requiring intubation.   - Prolonged intubation and s/p 7 PORTEX trach on 4/4   - Continue on vent 20/460/6/30 with nebs and chest PT  - Continue on dilaudid PRN for dyssynchrony     GI  # Shock liver vs cirrhosis with sickle cell   - Transaminitis noted in ICU and thought to be second to shock liver likely second to hemorrhagic shock vs cardiac arrest, however LFTs remains elevated with acute on chronic hyperbilirubinemia.   - US ABD suggestive of early cirrhotic changes, cholecystectomy, and CBD 9mm   - CT AP with cirrhosis   - LFTs rising with bromocriptine   - Holding further bromocriptine   - Trend LFTs and bili     # GIB  - Anemia with GIB noted in ICU   - s/p EGD 4/11 with cauterization of gastric ulcer & hemostatic spraying of three duodenal ulcers. Gastric ulcer likely due to NGT trauma.   - Continue on PPI BID   - Monitor stools and HH     # Oropharyngeal dysphagia   - GI unable to place PEG due to hepatomegaly.   - IR unable to place PEG second to no safe window   - s/p open G TUBE placement on 4/18   - Continue on TF    RENAL  # Acute renal failure and HAGMA second to shock state   - Scr on admission was 1.25 (3/15/25) and increased to 6s while in ICU and s/p HD 3/24-3/29 and then again on 4/4.   - L SHILEY removed on 4/15   - Continue on HCO3 1300 TID tabs with improved acidosis, however hypernatremic and stopped.   - Monitor renal function, acidosis, and UOP     # L renal mass bx c/b hematoma   - Renal bx as below on 3/19  - Hemorrhagic shock and PEA arrest on 3/20   - Renal US with large L renal subcapsular hematoma.    - IR left renal angiogram and embolization on 3/20   - CT 4/6 with unchanged large left subcapsular and perinephric hematoma.  - CT A/P (4/20) showing stable subcapsular renal hematoma and NO new active bleeds.  - Monitor for now    # Urinary retention   - Passed TOV on 4/23   - Urinary retention again and likely neurogenic bladder   - Dave placed 5/1   - Hold further TOVs    # Hypernatremia  - s/p D5W   - Continue on  Q6H   - Monitor sodium     # Hypercalcemia likely from dehydration vs immobility vs hyperparathyroidism?   - PTH on 4/20 elevated   - Rehydrated, however calcium remains stagnant  - s/p Dose calcitonin (5/11)  - Started sensipar 30 BID (5/11)  - Continue on rehydration   - PTH (5/12): 30  - pending vitamin D (sent 5/12)  - IVF given w/ lasix 20 x 1 for hypercalcemia corrected calcium 10.8. ionized calcium 1.41 (5/12)    # Hyperphosphatemia   - Phos elevation likely second to renal failure   - PTH elevated, however Phos slowly down trending   - Monitor phos    INFECTIOUS DISEASE   # Central fevers  - Recurrent fevers and complete LVQ empirically as below   - Started on bromocriptine with improvement in fever curve, however noted with transaminitis and bromocriptine stopped with return of elevated temperature intermittently.   - Patient with known R IJ DVT and L kidney hematoma and now new LLE DVT that can cause intermittent fevers.   - Hold work up for now   - Hold further ABX for now   - Monitor fever curve.     # Questionable UTI  - Recurrent fevers with POSITIVE UA and s/p LVQ (4/23 - 4/28)   - Monitor off ABX     # VAP   - Post arrest noted with concern for aspiration and VAP in ICU   - Flu, SCx, BAL, BCx and UCx negative   - MRSA PCR with MSSA and completed bactroban in ICU   - Completed aztreonam (3/22-3/27) then casey (3/27-3/31) and vanco by dose in ICU   - CXR in ICU with RUL consolidation and completed recurrent casey course in ICU (4/2-4/7).     HEMATOLOGIC  # Anemia with SCC vs L kidney bleed vs GIB   - Baseline hemoglobin outpatient ~6.0-7.0 and sent in for anemia down to 5.3 without signs of bleeding and more likely SCC.    - Transfusion given on admission and improved back to baseline.   - Course complicated by hemorrhagic shock from left renal hematoma and HH down to 3.4.  - s/p 10U PRBC total while in ICU   - s/p PRBC pre-GTUBE with HH increased to 8.0  - c/b rapid decline in HH post GTUBE likely normalizing to baseline 6-7 as RPT CT AP post G TUBE with new bleeds and no acute signs of sickling.   - s/p PRBC 4/22   - Monitor HH     # SCD   - Discontinued deferasirox due to current renal failure   - Monitor Sickle labs QD (CBC with Diff, Retic, BMP, Hepatic Function, LDH and Hapto, and VBG).   - No need for unit of PBRC prior to discharge as per heme onc (note written by heme onc 5/12)    ONCOLOGY   # L renal mass with non-invasive urothelial carcinoma  - Biopsy showing non-invasive urothelial carcinoma   - Case discussed with ONC and given HIE, physical debilitation, and vent dependence patient is not a candidate for DMT.     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   - Discussed with IR recommend holding IVC filter for now   - Continue on DVT PPX HSQ   - LUE and LLE precautions     # LUE arm infiltration   - HCO3 GTT infiltration in ICU   - Seen by hand sx and no compartment syndrome concern   - Monitor for now     # RUE blood infiltration   - RUE blood transfusion infiltration noted on 4/17 with area of ecchymosis   - RPT DOPPLER with known R IJ DVT, however no upper arm thrombus or issues in area of infiltration  - Monitor site for now    ENDOCRINE   # Glycemic control   - Monitor BG   - No hx of DM2     SKIN   - Wound care reccs appreciated    ETHICS   - FULL CODE     DISPO - vent facility when able 43 YO M with PMHx of sickle cell disease with prior acute chest syndrome requiring transfusion and exchange in the past (last 12/2024), iron overload, gout, HTN, and RUE DVT in 12/2024 on eliquis who presented on 3/15 by his hematologist second to anemia and hypoxia, down to 5.3 from baseline 7-8, with concern for vaso-occlusive crisis. While on medicine, anemia improved post transfusion and continued on SCC pain control PCA. Patient noted with left renal mass during prior admission and s/p L ureteroscopy with biopsy and stent placement on 3/19. Course complicated with hypoglycemia on 3/20 with RRT x 2 and found with severe anemia to 3.4 with hemorrhagic shock second to left perirenal and subcapsular hemorrhage and ultimately PEA arrest. ROSC achieved and transferred to MICU. s/p IR emobilization and course complicated anoxic brain injury, myoclonic jerks, GIB, MATA, HAGMA, DVTs (R IJ and LUE brachial), dysphagia, cirrhosis, and prolonged vent time s/p trach on 4/4. Transferred to [facility] on 4/18.     NEUROLOGY  # Anoxic brain injury   - s/p cardiac arrest on 3/20 with ROSC in 4 minutes   - CT HEAD post arrest with diffuse sulcal effacement with increased intracranial pressure, and few patchy foci of cortical blurring concern for HIE  - MRI brain post arrest with diffuse global abnormal supratentorial cortical restricted diffusion consistent with global hypoxic injury   - Monitor for now    # Seizures vs myoclonic jerks   - Witness myoclonic jerking concerning for seizures with anoxic brain injury  - vEEG with abundant myoclonic seizures in the setting of a severe diffuse/multifocal cerebral dysfunction which can be seen in the setting of sedative effect or due to anoxia  - s/p valium 10 Q4H   - Continue on keppra   - Continue on klonopin 2mg TID   - Continue on dilaudid PRN     # Central fevers   - Persistent fever spikes noted with concern for central fevers   - Bromocriptine started on 4/23, however noted with rise in LFTs and now stopped on 4/27   - improved, monitor fever curve    HEENT  # Tongue biting   - Noted with tongue trauma  - s/p bite block replacement by OMFS on 4/23  - s/p dental mouth guard scan on 5/2   - Mouth guard to be delivered on 5/16   - Dental will continue with mouth care and reposition bite block PRN    # Thrush   - On nystatin (4/28 - 5/4)   - Continue with mouth care     CARDIOLOGY  # Shock state likely hemorrhagic vs vasoplegia  - Acute renal bleed noted requiring multiple transfusions and pressors in ICU.   - TTE 3/22 with EF 63 with normal LVRVSF with PASP 49  - Weaned off pressors in ICU and complicated by hypertension.   - Continue on norvasc 5 QD   - Monitor BP     # Autonomic vs pain  - Mild HTN and tachypnea noted likely pain induced vs autonomia?   - Continue on dilaudid pushes PRN    PULMONARY  # Respiratory failure   - Presented with SCC and hypoxia likely from anemia with no signs of acute chest, however noted with PEA with hemorrhagic shock post renal bx requiring intubation.   - Prolonged intubation and s/p 7 PORTEX trach on 4/4   - Continue on vent 20/460/6/30 with nebs and chest PT  - Continue on dilaudid PRN for dyssynchrony     GI  # Shock liver vs cirrhosis with sickle cell   - Transaminitis noted in ICU and thought to be second to shock liver likely second to hemorrhagic shock vs cardiac arrest, however LFTs remains elevated with acute on chronic hyperbilirubinemia.   - US ABD suggestive of early cirrhotic changes, cholecystectomy, and CBD 9mm   - CT AP with cirrhosis   - LFTs rising with bromocriptine   - Holding further bromocriptine   - Trend LFTs and bili     # GIB  - Anemia with GIB noted in ICU   - s/p EGD 4/11 with cauterization of gastric ulcer & hemostatic spraying of three duodenal ulcers. Gastric ulcer likely due to NGT trauma.   - Continue on PPI BID   - Monitor stools and HH     # Oropharyngeal dysphagia   - GI unable to place PEG due to hepatomegaly.   - IR unable to place PEG second to no safe window   - s/p open G TUBE placement on 4/18   - Continue on TF    RENAL  # Acute renal failure and HAGMA second to shock state   - Scr on admission was 1.25 (3/15/25) and increased to 6s while in ICU and s/p HD 3/24-3/29 and then again on 4/4.   - L SHILEY removed on 4/15   - Continue on HCO3 1300 TID tabs with improved acidosis, however hypernatremic and stopped.   - Monitor renal function, acidosis, and UOP     # L renal mass bx c/b hematoma   - Renal bx as below on 3/19  - Hemorrhagic shock and PEA arrest on 3/20   - Renal US with large L renal subcapsular hematoma.    - IR left renal angiogram and embolization on 3/20   - CT 4/6 with unchanged large left subcapsular and perinephric hematoma.  - CT A/P (4/20) showing stable subcapsular renal hematoma and NO new active bleeds.  - Monitor for now    # Urinary retention   - Passed TOV on 4/23   - Urinary retention again and likely neurogenic bladder   - Dave placed 5/1   - Hold further TOVs    # Hypernatremia  - s/p D5W   - Continue on  Q6H   - Monitor sodium     # Hypercalcemia likely from dehydration vs immobility vs hyperparathyroidism?   - PTH on 4/20 elevated   - Rehydrated, however calcium remains stagnant  - s/p Dose calcitonin (5/11)  - Started sensipar 30 BID (5/11)  - Continue on rehydration   - PTH (5/12): 30  - pending vitamin D (sent 5/12)  - IVF given w/ lasix 20 x 1 for hypercalcemia corrected calcium 10.8. ionized calcium 1.41 (5/12)    # Hyperphosphatemia   - Phos elevation likely second to renal failure   - PTH elevated, however Phos slowly down trending   - Monitor phos    INFECTIOUS DISEASE   # Central fevers  - Recurrent fevers and complete LVQ empirically as below   - Started on bromocriptine with improvement in fever curve, however noted with transaminitis and bromocriptine stopped with return of elevated temperature intermittently.   - Patient with known R IJ DVT and L kidney hematoma and now new LLE DVT that can cause intermittent fevers.   - Hold work up for now   - Hold further ABX for now   - Monitor fever curve.     # Questionable UTI  - Recurrent fevers with POSITIVE UA and s/p LVQ (4/23 - 4/28)   - Monitor off ABX     # VAP   - Post arrest noted with concern for aspiration and VAP in ICU   - Flu, SCx, BAL, BCx and UCx negative   - MRSA PCR with MSSA and completed bactroban in ICU   - Completed aztreonam (3/22-3/27) then casey (3/27-3/31) and vanco by dose in ICU   - CXR in ICU with RUL consolidation and completed recurrent casey course in ICU (4/2-4/7).     HEMATOLOGIC  # Anemia with SCC vs L kidney bleed vs GIB   - Baseline hemoglobin outpatient ~6.0-7.0 and sent in for anemia down to 5.3 without signs of bleeding and more likely SCC.    - Transfusion given on admission and improved back to baseline.   - Course complicated by hemorrhagic shock from left renal hematoma and HH down to 3.4.  - s/p 10U PRBC total while in ICU   - s/p PRBC pre-GTUBE with HH increased to 8.0  - c/b rapid decline in HH post GTUBE likely normalizing to baseline 6-7 as RPT CT AP post G TUBE with new bleeds and no acute signs of sickling.   - s/p PRBC 4/22, x1 unit 5/13  - Monitor HH     # SCD   - Discontinued deferasirox due to current renal failure   - Monitor Sickle labs QD (CBC with Diff, Retic, BMP, Hepatic Function, LDH and Hapto, and VBG).   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   - Discussed with IR recommend holding IVC filter for now   - Continue on DVT PPX HSQ   - LUE and LLE precautions     # LUE arm infiltration   - HCO3 GTT infiltration in ICU   - Seen by hand sx and no compartment syndrome concern   - Monitor for now     # RUE blood infiltration   - RUE blood transfusion infiltration noted on 4/17 with area of ecchymosis   - RPT DOPPLER with known R IJ DVT, however no upper arm thrombus or issues in area of infiltration  - Monitor site for now    ENDOCRINE   # Glycemic control   - Monitor BG   - No hx of DM2     SKIN   - Wound care reccs appreciated    ETHICS   - FULL CODE     DISPO - Hopeful discharge 5/14

## 2025-05-13 NOTE — PROVIDER CONTACT NOTE (CRITICAL VALUE NOTIFICATION) - NAME OF MD/NP/PA/DO NOTIFIED:
Sheila Lobo NP
Sally Duke
May Tan
Sally Espinoza
Thi Carey
KENAN Pickens
Brandon Delgado
Sheila patton
Brandon Delgado
KENAN Pickens
KENAN Pickens
May Tan
KENAN Pickens

## 2025-05-13 NOTE — PROVIDER CONTACT NOTE (CRITICAL VALUE NOTIFICATION) - RECOMMENDATIONS
Provider aware
Provider recommendation.
Contact Provider
contact provider
Provider notified
notify provider
1 unit of blood
Contact provider
contact provider
Notify provider
Provider notified

## 2025-05-13 NOTE — PROVIDER CONTACT NOTE (CRITICAL VALUE NOTIFICATION) - TEST AND RESULT REPORTED:
Hgb:5.9 , Hematocrit;18.2
CBC H&H: 6.3 / 19.2
hemoglobin 5.9, hematocrit 16.8
CBC: H&H 6.9 / 20.8
Hemoglobin 5.9  Hematocrit 18.2
Hgb 6.7 and Hct 20.2
hematocrit 19.3
HGB 7 and HCT 20.9
Hemoglobin 7.0
VBG: H&H 6.5 / 20
Hemoglobin 7.0
VBG: Hgb 6.39 Hct 21  / CBC: Hgb 6.8
CBC; H&H: 6.2 / 18.5

## 2025-05-13 NOTE — PROVIDER CONTACT NOTE (CRITICAL VALUE NOTIFICATION) - PERSON GIVING RESULT:
Lab; IGNACIA Jim
Pedro THACKER
IGNACIA Hunter
OLENA Jaimes (Central Lab)
XANDER Cleaning/Lab
Rashawn Hematology
IGNACIA Murphy (Lab)
Central Lab
KKimmanuel
[place]
 Y
XANDER Cleaning/Lab
Tyra Yuan @ 07:17 / XAVIER Douglass @ 8:23

## 2025-05-13 NOTE — PROGRESS NOTE ADULT - NS ATTEND AMEND GEN_ALL_CORE FT
agree with above  calcium improved  hemodyn stable and afebrile  1 prbc given for Hb 5.9  overall, anoxic brain injury, vent dependent, renal cancer not a candidate for tx, and underlying sickle cell  poor prognosis  stable for d/c to vent facility

## 2025-05-14 ENCOUNTER — TRANSCRIPTION ENCOUNTER (OUTPATIENT)
Age: 45
End: 2025-05-14

## 2025-05-14 PROCEDURE — 99233 SBSQ HOSP IP/OBS HIGH 50: CPT

## 2025-05-14 RX ADMIN — LEVETIRACETAM 500 MILLIGRAM(S): 10 INJECTION, SOLUTION INTRAVENOUS at 17:20

## 2025-05-14 RX ADMIN — IPRATROPIUM BROMIDE AND ALBUTEROL SULFATE 3 MILLILITER(S): .5; 2.5 SOLUTION RESPIRATORY (INHALATION) at 15:13

## 2025-05-14 RX ADMIN — HEPARIN SODIUM 5000 UNIT(S): 1000 INJECTION INTRAVENOUS; SUBCUTANEOUS at 17:20

## 2025-05-14 RX ADMIN — CLONAZEPAM 2 MILLIGRAM(S): 0.5 TABLET ORAL at 05:37

## 2025-05-14 RX ADMIN — Medication 15 MILLILITER(S): at 05:37

## 2025-05-14 RX ADMIN — IPRATROPIUM BROMIDE AND ALBUTEROL SULFATE 3 MILLILITER(S): .5; 2.5 SOLUTION RESPIRATORY (INHALATION) at 03:14

## 2025-05-14 RX ADMIN — Medication 15 MILLILITER(S): at 13:43

## 2025-05-14 RX ADMIN — CINACALCET 30 MILLIGRAM(S): 30 TABLET, FILM COATED ORAL at 17:21

## 2025-05-14 RX ADMIN — IPRATROPIUM BROMIDE AND ALBUTEROL SULFATE 3 MILLILITER(S): .5; 2.5 SOLUTION RESPIRATORY (INHALATION) at 21:20

## 2025-05-14 RX ADMIN — Medication 15 MILLILITER(S): at 23:34

## 2025-05-14 RX ADMIN — CLONAZEPAM 2 MILLIGRAM(S): 0.5 TABLET ORAL at 00:04

## 2025-05-14 RX ADMIN — Medication 40 MILLIGRAM(S): at 08:00

## 2025-05-14 RX ADMIN — Medication 40 MILLIGRAM(S): at 18:40

## 2025-05-14 RX ADMIN — Medication 650 MILLIGRAM(S): at 11:08

## 2025-05-14 RX ADMIN — HEPARIN SODIUM 5000 UNIT(S): 1000 INJECTION INTRAVENOUS; SUBCUTANEOUS at 05:37

## 2025-05-14 RX ADMIN — CLONAZEPAM 2 MILLIGRAM(S): 0.5 TABLET ORAL at 23:34

## 2025-05-14 RX ADMIN — Medication 650 MILLIGRAM(S): at 00:04

## 2025-05-14 RX ADMIN — AMLODIPINE BESYLATE 5 MILLIGRAM(S): 10 TABLET ORAL at 05:37

## 2025-05-14 RX ADMIN — CLONAZEPAM 2 MILLIGRAM(S): 0.5 TABLET ORAL at 13:42

## 2025-05-14 RX ADMIN — Medication 15 MILLILITER(S): at 00:05

## 2025-05-14 RX ADMIN — LEVETIRACETAM 500 MILLIGRAM(S): 10 INJECTION, SOLUTION INTRAVENOUS at 05:37

## 2025-05-14 RX ADMIN — Medication 1 APPLICATION(S): at 11:08

## 2025-05-14 RX ADMIN — CINACALCET 30 MILLIGRAM(S): 30 TABLET, FILM COATED ORAL at 05:37

## 2025-05-14 RX ADMIN — IPRATROPIUM BROMIDE AND ALBUTEROL SULFATE 3 MILLILITER(S): .5; 2.5 SOLUTION RESPIRATORY (INHALATION) at 06:57

## 2025-05-14 NOTE — DISCHARGE NOTE NURSING/CASE MANAGEMENT/SOCIAL WORK - FINANCIAL ASSISTANCE
[facility] provides services at a reduced cost to those who are determined to be eligible through [facility]’s financial assistance program. Information regarding [facility]’s financial assistance program can be found by going to [email or web address] or by calling [phone]. Acute rehab

## 2025-05-14 NOTE — PROGRESS NOTE ADULT - SUBJECTIVE AND OBJECTIVE BOX
CHIEF COMPLAINT:Patient is a 45y old  Male who presents with a chief complaint of Referred by Hematologist for low Hg (13 May 2025 08:02)      INTERVAL EVENTS:     ROS: Seen by bedside during AM rounds     OBJECTIVE:  ICU Vital Signs Last 24 Hrs  T(C): 36.3 (14 May 2025 05:35), Max: 37.3 (13 May 2025 19:46)  T(F): 97.4 (14 May 2025 05:35), Max: 99.1 (13 May 2025 19:46)  HR: 94 (14 May 2025 06:58) (94 - 115)  BP: 139/69 (14 May 2025 05:35) (137/73 - 152/80)  BP(mean): --  ABP: --  ABP(mean): --  RR: 20 (14 May 2025 05:35) (20 - 20)  SpO2: 100% (14 May 2025 06:58) (99% - 100%)    O2 Parameters below as of 14 May 2025 06:58  Patient On (Oxygen Delivery Method): ventilator          Mode: AC/ CMV (Assist Control/ Continuous Mandatory Ventilation), RR (machine): 20, TV (machine): 460, FiO2: 30, PEEP: 6, MAP: 11, PIP: 21    05-13 @ 07:01  -  05-14 @ 07:00  --------------------------------------------------------  IN: 2770 mL / OUT: 1925 mL / NET: 845 mL      CAPILLARY BLOOD GLUCOSE          PHYSICAL EXAM:  General:   HEENT:   Lymph Nodes:  Neck:   Respiratory:   Cardiovascular:   Abdomen:   Extremities:   Skin:   Neurological:  Psychiatry:    Mode: AC/ CMV (Assist Control/ Continuous Mandatory Ventilation)  RR (machine): 20  TV (machine): 460  FiO2: 30  PEEP: 6  MAP: 11  PIP: 21      HOSPITAL MEDICATIONS:  MEDICATIONS  (STANDING):  albuterol/ipratropium for Nebulization 3 milliLiter(s) Nebulizer every 6 hours  amLODIPine   Tablet 5 milliGRAM(s) Oral daily  Biotene Dry Mouth Oral Rinse 15 milliLiter(s) Swish and Spit every 8 hours  chlorhexidine 2% Cloths 1 Application(s) Topical daily  cinacalcet suspension 30 milliGRAM(s) Oral every 12 hours  clonazePAM  Tablet 2 milliGRAM(s) Oral every 8 hours  heparin   Injectable 5000 Unit(s) SubCutaneous every 12 hours  influenza   Vaccine 0.5 milliLiter(s) IntraMuscular once  levETIRAcetam  Solution 500 milliGRAM(s) Oral two times a day  pantoprazole   Suspension 40 milliGRAM(s) Oral every 12 hours  sodium chloride 0.9%. 1000 milliLiter(s) (100 mL/Hr) IV Continuous <Continuous>    MEDICATIONS  (PRN):  acetaminophen   Oral Liquid .. 650 milliGRAM(s) Oral every 6 hours PRN Temp greater or equal to 38C (100.4F), Mild Pain (1 - 3)  HYDROmorphone  Injectable 0.5 milliGRAM(s) IV Push every 4 hours PRN dysynchronny      LABS:                        7.3    15.73 )-----------( 247      ( 13 May 2025 15:30 )             21.8     05-13    141  |  113[H]  |  36[H]  ----------------------------<  113[H]  3.7   |  17[L]  |  0.71    Ca    9.4      13 May 2025 05:46  Phos  2.6     05-13  Mg     1.60     05-13    TPro  7.3  /  Alb  2.9[L]  /  TBili  1.9[H]  /  DBili  x   /  AST  57[H]  /  ALT  54[H]  /  AlkPhos  479[H]  05-13      Urinalysis Basic - ( 13 May 2025 05:46 )    Color: x / Appearance: x / SG: x / pH: x  Gluc: 113 mg/dL / Ketone: x  / Bili: x / Urobili: x   Blood: x / Protein: x / Nitrite: x   Leuk Esterase: x / RBC: x / WBC x   Sq Epi: x / Non Sq Epi: x / Bacteria: x         CHIEF COMPLAINT:Patient is a 45y old  Male who presents with a chief complaint of Referred by Hematologist for low Hg (13 May 2025 08:02)      INTERVAL EVENTS:     ROS: Seen by bedside during AM rounds     OBJECTIVE:  ICU Vital Signs Last 24 Hrs  T(C): 36.3 (14 May 2025 05:35), Max: 37.3 (13 May 2025 19:46)  T(F): 97.4 (14 May 2025 05:35), Max: 99.1 (13 May 2025 19:46)  HR: 94 (14 May 2025 06:58) (94 - 115)  BP: 139/69 (14 May 2025 05:35) (137/73 - 152/80)  BP(mean): --  ABP: --  ABP(mean): --  RR: 20 (14 May 2025 05:35) (20 - 20)  SpO2: 100% (14 May 2025 06:58) (99% - 100%)    O2 Parameters below as of 14 May 2025 06:58  Patient On (Oxygen Delivery Method): ventilator          Mode: AC/ CMV (Assist Control/ Continuous Mandatory Ventilation), RR (machine): 20, TV (machine): 460, FiO2: 30, PEEP: 6, MAP: 11, PIP: 21    05-13 @ 07:01  -  05-14 @ 07:00  --------------------------------------------------------  IN: 2770 mL / OUT: 1925 mL / NET: 845 mL      CAPILLARY BLOOD GLUCOSE          PHYSICAL EXAM:  General: NAD   Neck: (+) Trach tube noted, site c/d/i.  Cards: S1/S2, no murmurs   Pulm: rhonchorous b/l. No resp distress.    Abdomen: Soft, NTND. (+) G tube noted, site c/d/i.   Extremities: Anasarca with b/l UE (R>L) and LE edema . Extremities warm to touch.  Neurology: Anoxic. PERRL. Nonverbal. Not following commands.   Skin: warm to touch, color appropriate for ethnicity. Refer to RN assessment for further details.    Mode: AC/ CMV (Assist Control/ Continuous Mandatory Ventilation)  RR (machine): 20  TV (machine): 460  FiO2: 30  PEEP: 6  MAP: 11  PIP: 21      HOSPITAL MEDICATIONS:  MEDICATIONS  (STANDING):  albuterol/ipratropium for Nebulization 3 milliLiter(s) Nebulizer every 6 hours  amLODIPine   Tablet 5 milliGRAM(s) Oral daily  Biotene Dry Mouth Oral Rinse 15 milliLiter(s) Swish and Spit every 8 hours  chlorhexidine 2% Cloths 1 Application(s) Topical daily  cinacalcet suspension 30 milliGRAM(s) Oral every 12 hours  clonazePAM  Tablet 2 milliGRAM(s) Oral every 8 hours  heparin   Injectable 5000 Unit(s) SubCutaneous every 12 hours  influenza   Vaccine 0.5 milliLiter(s) IntraMuscular once  levETIRAcetam  Solution 500 milliGRAM(s) Oral two times a day  pantoprazole   Suspension 40 milliGRAM(s) Oral every 12 hours  sodium chloride 0.9%. 1000 milliLiter(s) (100 mL/Hr) IV Continuous <Continuous>    MEDICATIONS  (PRN):  acetaminophen   Oral Liquid .. 650 milliGRAM(s) Oral every 6 hours PRN Temp greater or equal to 38C (100.4F), Mild Pain (1 - 3)  HYDROmorphone  Injectable 0.5 milliGRAM(s) IV Push every 4 hours PRN dysynchronny      LABS:                        7.3    15.73 )-----------( 247      ( 13 May 2025 15:30 )             21.8     05-13    141  |  113[H]  |  36[H]  ----------------------------<  113[H]  3.7   |  17[L]  |  0.71    Ca    9.4      13 May 2025 05:46  Phos  2.6     05-13  Mg     1.60     05-13    TPro  7.3  /  Alb  2.9[L]  /  TBili  1.9[H]  /  DBili  x   /  AST  57[H]  /  ALT  54[H]  /  AlkPhos  479[H]  05-13      Urinalysis Basic - ( 13 May 2025 05:46 )    Color: x / Appearance: x / SG: x / pH: x  Gluc: 113 mg/dL / Ketone: x  / Bili: x / Urobili: x   Blood: x / Protein: x / Nitrite: x   Leuk Esterase: x / RBC: x / WBC x   Sq Epi: x / Non Sq Epi: x / Bacteria: x         CHIEF COMPLAINT:Patient is a 45y old  Male who presents with a chief complaint of Referred by Hematologist for low Hg (13 May 2025 08:02)      INTERVAL EVENTS: no acute overnight events noted. Clinically unchanged. Pending receipt and application of mouthguard on Fri before discharge to facility.     ROS: Seen by bedside during AM rounds     OBJECTIVE:  ICU Vital Signs Last 24 Hrs  T(C): 36.3 (14 May 2025 05:35), Max: 37.3 (13 May 2025 19:46)  T(F): 97.4 (14 May 2025 05:35), Max: 99.1 (13 May 2025 19:46)  HR: 94 (14 May 2025 06:58) (94 - 115)  BP: 139/69 (14 May 2025 05:35) (137/73 - 152/80)  BP(mean): --  ABP: --  ABP(mean): --  RR: 20 (14 May 2025 05:35) (20 - 20)  SpO2: 100% (14 May 2025 06:58) (99% - 100%)    O2 Parameters below as of 14 May 2025 06:58  Patient On (Oxygen Delivery Method): ventilator          Mode: AC/ CMV (Assist Control/ Continuous Mandatory Ventilation), RR (machine): 20, TV (machine): 460, FiO2: 30, PEEP: 6, MAP: 11, PIP: 21    05-13 @ 07:01  -  05-14 @ 07:00  --------------------------------------------------------  IN: 2770 mL / OUT: 1925 mL / NET: 845 mL      CAPILLARY BLOOD GLUCOSE          PHYSICAL EXAM:  General: NAD   Neck: (+) Trach tube noted, site c/d/i.  Cards: S1/S2, no murmurs   Pulm: rhonchorous b/l. No resp distress.    Abdomen: Soft, NTND. (+) G tube noted, site c/d/i.   Extremities: Anasarca with b/l UE (R>L) and LE edema . Extremities warm to touch.  Neurology: Anoxic. PERRL. Nonverbal. Not following commands.   Skin: warm to touch, color appropriate for ethnicity. Refer to RN assessment for further details.    Mode: AC/ CMV (Assist Control/ Continuous Mandatory Ventilation)  RR (machine): 20  TV (machine): 460  FiO2: 30  PEEP: 6  MAP: 11  PIP: 21      HOSPITAL MEDICATIONS:  MEDICATIONS  (STANDING):  albuterol/ipratropium for Nebulization 3 milliLiter(s) Nebulizer every 6 hours  amLODIPine   Tablet 5 milliGRAM(s) Oral daily  Biotene Dry Mouth Oral Rinse 15 milliLiter(s) Swish and Spit every 8 hours  chlorhexidine 2% Cloths 1 Application(s) Topical daily  cinacalcet suspension 30 milliGRAM(s) Oral every 12 hours  clonazePAM  Tablet 2 milliGRAM(s) Oral every 8 hours  heparin   Injectable 5000 Unit(s) SubCutaneous every 12 hours  influenza   Vaccine 0.5 milliLiter(s) IntraMuscular once  levETIRAcetam  Solution 500 milliGRAM(s) Oral two times a day  pantoprazole   Suspension 40 milliGRAM(s) Oral every 12 hours  sodium chloride 0.9%. 1000 milliLiter(s) (100 mL/Hr) IV Continuous <Continuous>    MEDICATIONS  (PRN):  acetaminophen   Oral Liquid .. 650 milliGRAM(s) Oral every 6 hours PRN Temp greater or equal to 38C (100.4F), Mild Pain (1 - 3)  HYDROmorphone  Injectable 0.5 milliGRAM(s) IV Push every 4 hours PRN dysynchronny      LABS:                        7.3    15.73 )-----------( 247      ( 13 May 2025 15:30 )             21.8     05-13    141  |  113[H]  |  36[H]  ----------------------------<  113[H]  3.7   |  17[L]  |  0.71    Ca    9.4      13 May 2025 05:46  Phos  2.6     05-13  Mg     1.60     05-13    TPro  7.3  /  Alb  2.9[L]  /  TBili  1.9[H]  /  DBili  x   /  AST  57[H]  /  ALT  54[H]  /  AlkPhos  479[H]  05-13      Urinalysis Basic - ( 13 May 2025 05:46 )    Color: x / Appearance: x / SG: x / pH: x  Gluc: 113 mg/dL / Ketone: x  / Bili: x / Urobili: x   Blood: x / Protein: x / Nitrite: x   Leuk Esterase: x / RBC: x / WBC x   Sq Epi: x / Non Sq Epi: x / Bacteria: x         CHIEF COMPLAINT:Patient is a 45y old  Male who presents with a chief complaint of Referred by Hematologist for low Hg (13 May 2025 08:02)      INTERVAL EVENTS: no acute overnight events noted. Clinically unchanged. Pending receipt and application of mouthguard on Fri before discharge to facility.     ROS: Unable to obtain ROS given patient is anoxic.     OBJECTIVE:  ICU Vital Signs Last 24 Hrs  T(C): 36.3 (14 May 2025 05:35), Max: 37.3 (13 May 2025 19:46)  T(F): 97.4 (14 May 2025 05:35), Max: 99.1 (13 May 2025 19:46)  HR: 94 (14 May 2025 06:58) (94 - 115)  BP: 139/69 (14 May 2025 05:35) (137/73 - 152/80)  BP(mean): --  ABP: --  ABP(mean): --  RR: 20 (14 May 2025 05:35) (20 - 20)  SpO2: 100% (14 May 2025 06:58) (99% - 100%)    O2 Parameters below as of 14 May 2025 06:58  Patient On (Oxygen Delivery Method): ventilator          Mode: AC/ CMV (Assist Control/ Continuous Mandatory Ventilation), RR (machine): 20, TV (machine): 460, FiO2: 30, PEEP: 6, MAP: 11, PIP: 21    05-13 @ 07:01  -  05-14 @ 07:00  --------------------------------------------------------  IN: 2770 mL / OUT: 1925 mL / NET: 845 mL      CAPILLARY BLOOD GLUCOSE          PHYSICAL EXAM:  General: NAD   Neck: (+) Trach tube noted, site c/d/i.  Cards: S1/S2, no murmurs   Pulm: rhonchorous b/l. No resp distress.    Abdomen: Soft, NTND. (+) G tube noted, site c/d/i. Midline laparotomy incision well healed.   Extremities: Anasarca with b/l UE (R>L) and LE edema . Extremities warm to touch.  Neurology: Anoxic. PERRL. Nonverbal. Not following commands. Non interactive with examiner.   Skin: warm to touch, color appropriate for ethnicity. Refer to RN assessment for further details.    Mode: AC/ CMV (Assist Control/ Continuous Mandatory Ventilation)  RR (machine): 20  TV (machine): 460  FiO2: 30  PEEP: 6  MAP: 11  PIP: 21      HOSPITAL MEDICATIONS:  MEDICATIONS  (STANDING):  albuterol/ipratropium for Nebulization 3 milliLiter(s) Nebulizer every 6 hours  amLODIPine   Tablet 5 milliGRAM(s) Oral daily  Biotene Dry Mouth Oral Rinse 15 milliLiter(s) Swish and Spit every 8 hours  chlorhexidine 2% Cloths 1 Application(s) Topical daily  cinacalcet suspension 30 milliGRAM(s) Oral every 12 hours  clonazePAM  Tablet 2 milliGRAM(s) Oral every 8 hours  heparin   Injectable 5000 Unit(s) SubCutaneous every 12 hours  influenza   Vaccine 0.5 milliLiter(s) IntraMuscular once  levETIRAcetam  Solution 500 milliGRAM(s) Oral two times a day  pantoprazole   Suspension 40 milliGRAM(s) Oral every 12 hours  sodium chloride 0.9%. 1000 milliLiter(s) (100 mL/Hr) IV Continuous <Continuous>    MEDICATIONS  (PRN):  acetaminophen   Oral Liquid .. 650 milliGRAM(s) Oral every 6 hours PRN Temp greater or equal to 38C (100.4F), Mild Pain (1 - 3)  HYDROmorphone  Injectable 0.5 milliGRAM(s) IV Push every 4 hours PRN dysynchronny      LABS:                        7.3    15.73 )-----------( 247      ( 13 May 2025 15:30 )             21.8     05-13    141  |  113[H]  |  36[H]  ----------------------------<  113[H]  3.7   |  17[L]  |  0.71    Ca    9.4      13 May 2025 05:46  Phos  2.6     05-13  Mg     1.60     05-13    TPro  7.3  /  Alb  2.9[L]  /  TBili  1.9[H]  /  DBili  x   /  AST  57[H]  /  ALT  54[H]  /  AlkPhos  479[H]  05-13      Urinalysis Basic - ( 13 May 2025 05:46 )    Color: x / Appearance: x / SG: x / pH: x  Gluc: 113 mg/dL / Ketone: x  / Bili: x / Urobili: x   Blood: x / Protein: x / Nitrite: x   Leuk Esterase: x / RBC: x / WBC x   Sq Epi: x / Non Sq Epi: x / Bacteria: x

## 2025-05-14 NOTE — PROGRESS NOTE ADULT - ASSESSMENT
43 YO M with PMHx of sickle cell disease with prior acute chest syndrome requiring transfusion and exchange in the past (last 12/2024), iron overload, gout, HTN, and RUE DVT in 12/2024 on eliquis who presented on 3/15 by his hematologist second to anemia and hypoxia, down to 5.3 from baseline 7-8, with concern for vaso-occlusive crisis. While on medicine, anemia improved post transfusion and continued on SCC pain control PCA. Patient noted with left renal mass during prior admission and s/p L ureteroscopy with biopsy and stent placement on 3/19. Course complicated with hypoglycemia on 3/20 with RRT x 2 and found with severe anemia to 3.4 with hemorrhagic shock second to left perirenal and subcapsular hemorrhage and ultimately PEA arrest. ROSC achieved and transferred to MICU. s/p IR emobilization and course complicated anoxic brain injury, myoclonic jerks, GIB, MATA, HAGMA, DVTs (R IJ and LUE brachial), dysphagia, cirrhosis, and prolonged vent time s/p trach on 4/4. Transferred to [facility] on 4/18.     NEUROLOGY  # Anoxic brain injury   - s/p cardiac arrest on 3/20 with ROSC in 4 minutes   - CT HEAD post arrest with diffuse sulcal effacement with increased intracranial pressure, and few patchy foci of cortical blurring concern for HIE  - MRI brain post arrest with diffuse global abnormal supratentorial cortical restricted diffusion consistent with global hypoxic injury   - Monitor for now    # Seizures vs myoclonic jerks   - Witness myoclonic jerking concerning for seizures with anoxic brain injury  - vEEG with abundant myoclonic seizures in the setting of a severe diffuse/multifocal cerebral dysfunction which can be seen in the setting of sedative effect or due to anoxia  - s/p valium 10 Q4H   - Continue on keppra   - Continue on klonopin 2mg TID   - Continue on dilaudid PRN     # Central fevers   - Persistent fever spikes noted with concern for central fevers   - Bromocriptine started on 4/23, however noted with rise in LFTs and now stopped on 4/27   - improved, monitor fever curve    HEENT  # Tongue biting   - Noted with tongue trauma  - s/p bite block replacement by OMFS on 4/23  - s/p dental mouth guard scan on 5/2   - Mouth guard to be delivered on 5/16   - Dental will continue with mouth care and reposition bite block PRN    # Thrush   - On nystatin (4/28 - 5/4)   - Continue with mouth care     CARDIOLOGY  # Shock state likely hemorrhagic vs vasoplegia  - Acute renal bleed noted requiring multiple transfusions and pressors in ICU.   - TTE 3/22 with EF 63 with normal LVRVSF with PASP 49  - Weaned off pressors in ICU and complicated by hypertension.   - Continue on norvasc 5 QD   - Monitor BP     # Autonomic vs pain  - Mild HTN and tachypnea noted likely pain induced vs autonomia?   - Continue on dilaudid pushes PRN    PULMONARY  # Respiratory failure   - Presented with SCC and hypoxia likely from anemia with no signs of acute chest, however noted with PEA with hemorrhagic shock post renal bx requiring intubation.   - Prolonged intubation and s/p 7 PORTEX trach on 4/4   - Continue on vent 20/460/6/30 with nebs and chest PT  - Continue on dilaudid PRN for dyssynchrony     GI  # Shock liver vs cirrhosis with sickle cell   - Transaminitis noted in ICU and thought to be second to shock liver likely second to hemorrhagic shock vs cardiac arrest, however LFTs remains elevated with acute on chronic hyperbilirubinemia.   - US ABD suggestive of early cirrhotic changes, cholecystectomy, and CBD 9mm   - CT AP with cirrhosis   - LFTs rising with bromocriptine   - Holding further bromocriptine   - Trend LFTs and bili     # GIB  - Anemia with GIB noted in ICU   - s/p EGD 4/11 with cauterization of gastric ulcer & hemostatic spraying of three duodenal ulcers. Gastric ulcer likely due to NGT trauma.   - Continue on PPI BID   - Monitor stools and HH     # Oropharyngeal dysphagia   - GI unable to place PEG due to hepatomegaly.   - IR unable to place PEG second to no safe window   - s/p open G TUBE placement on 4/18   - Continue on TF    RENAL  # Acute renal failure and HAGMA second to shock state   - Scr on admission was 1.25 (3/15/25) and increased to 6s while in ICU and s/p HD 3/24-3/29 and then again on 4/4.   - L SHILEY removed on 4/15   - Continue on HCO3 1300 TID tabs with improved acidosis, however hypernatremic and stopped.   - Monitor renal function, acidosis, and UOP     # L renal mass bx c/b hematoma   - Renal bx as below on 3/19  - Hemorrhagic shock and PEA arrest on 3/20   - Renal US with large L renal subcapsular hematoma.    - IR left renal angiogram and embolization on 3/20   - CT 4/6 with unchanged large left subcapsular and perinephric hematoma.  - CT A/P (4/20) showing stable subcapsular renal hematoma and NO new active bleeds.  - Monitor for now    # Urinary retention   - Passed TOV on 4/23   - Urinary retention again and likely neurogenic bladder   - Dave placed 5/1   - Hold further TOVs    # Hypernatremia  - s/p D5W   - Continue on  Q6H   - Monitor sodium     # Hypercalcemia likely from dehydration vs immobility vs hyperparathyroidism?   - PTH on 4/20 elevated   - Rehydrated, however calcium remains stagnant  - s/p Dose calcitonin (5/11)  - Started sensipar 30 BID (5/11)  - Continue on rehydration   - PTH (5/12): 30  - pending vitamin D (sent 5/12)  - IVF given w/ lasix 20 x 1 for hypercalcemia corrected calcium 10.8. ionized calcium 1.41 (5/12)    # Hyperphosphatemia   - Phos elevation likely second to renal failure   - PTH elevated, however Phos slowly down trending   - Monitor phos    INFECTIOUS DISEASE   # Central fevers  - Recurrent fevers and complete LVQ empirically as below   - Started on bromocriptine with improvement in fever curve, however noted with transaminitis and bromocriptine stopped with return of elevated temperature intermittently.   - Patient with known R IJ DVT and L kidney hematoma and now new LLE DVT that can cause intermittent fevers.   - Hold work up for now   - Hold further ABX for now   - Monitor fever curve.     # Questionable UTI  - Recurrent fevers with POSITIVE UA and s/p LVQ (4/23 - 4/28)   - Monitor off ABX     # VAP   - Post arrest noted with concern for aspiration and VAP in ICU   - Flu, SCx, BAL, BCx and UCx negative   - MRSA PCR with MSSA and completed bactroban in ICU   - Completed aztreonam (3/22-3/27) then casey (3/27-3/31) and vanco by dose in ICU   - CXR in ICU with RUL consolidation and completed recurrent casey course in ICU (4/2-4/7).     HEMATOLOGIC  # Anemia with SCC vs L kidney bleed vs GIB   - Baseline hemoglobin outpatient ~6.0-7.0 and sent in for anemia down to 5.3 without signs of bleeding and more likely SCC.    - Transfusion given on admission and improved back to baseline.   - Course complicated by hemorrhagic shock from left renal hematoma and HH down to 3.4.  - s/p 10U PRBC total while in ICU   - s/p PRBC pre-GTUBE with HH increased to 8.0  - c/b rapid decline in HH post GTUBE likely normalizing to baseline 6-7 as RPT CT AP post G TUBE with new bleeds and no acute signs of sickling.   - s/p PRBC 4/22, x1 unit 5/13  - Monitor HH     # SCD   - Discontinued deferasirox due to current renal failure   - Monitor Sickle labs QD (CBC with Diff, Retic, BMP, Hepatic Function, LDH and Hapto, and VBG).   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   - Discussed with IR recommend holding IVC filter for now   - Continue on DVT PPX HSQ   - LUE and LLE precautions     # LUE arm infiltration   - HCO3 GTT infiltration in ICU   - Seen by hand sx and no compartment syndrome concern   - Monitor for now     # RUE blood infiltration   - RUE blood transfusion infiltration noted on 4/17 with area of ecchymosis   - RPT DOPPLER with known R IJ DVT, however no upper arm thrombus or issues in area of infiltration  - Monitor site for now    ENDOCRINE   # Glycemic control   - Monitor BG   - No hx of DM2     SKIN   - Wound care reccs appreciated    ETHICS   - FULL CODE     DISPO - Hopeful discharge 5/14 45 YO M with PMHx of sickle cell disease with prior acute chest syndrome requiring transfusion and exchange in the past (last 12/2024), iron overload, gout, HTN, and RUE DVT in 12/2024 on eliquis who presented on 3/15 by his hematologist second to anemia and hypoxia, down to 5.3 from baseline 7-8, with concern for vaso-occlusive crisis. While on medicine, anemia improved post transfusion and continued on SCC pain control PCA. Patient noted with left renal mass during prior admission and s/p L ureteroscopy with biopsy and stent placement on 3/19. Course complicated with hypoglycemia on 3/20 with RRT x 2 and found with severe anemia to 3.4 with hemorrhagic shock second to left perirenal and subcapsular hemorrhage and ultimately PEA arrest. ROSC achieved and transferred to MICU. s/p IR emobilization and course complicated anoxic brain injury, myoclonic jerks, GIB, MATA, HAGMA, DVTs (R IJ and LUE brachial), dysphagia, cirrhosis, and prolonged vent time s/p trach on 4/4. Transferred to [facility] on 4/18.     NEUROLOGY  # Anoxic brain injury   - s/p cardiac arrest on 3/20 with ROSC in 4 minutes   - CT HEAD post arrest with diffuse sulcal effacement with increased intracranial pressure, and few patchy foci of cortical blurring concern for HIE  - MRI brain post arrest with diffuse global abnormal supratentorial cortical restricted diffusion consistent with global hypoxic injury   - Monitor for now    # Seizures vs myoclonic jerks   - Witness myoclonic jerking concerning for seizures with anoxic brain injury  - vEEG with abundant myoclonic seizures in the setting of a severe diffuse/multifocal cerebral dysfunction which can be seen in the setting of sedative effect or due to anoxia  - s/p valium 10 Q4H   - Continue on keppra   - Continue on klonopin 2mg TID   - Continue on dilaudid PRN     # Central fevers   - Persistent fever spikes noted with concern for central fevers   - Bromocriptine started on 4/23, however noted with rise in LFTs and now stopped on 4/27   - improved, monitor fever curve    HEENT  # Tongue biting   - Noted with tongue trauma  - s/p bite block replacement by OMFS on 4/23  - s/p dental mouth guard scan on 5/2   - Mouth guard to be delivered on 5/16   - Dental will continue with mouth care and reposition bite block PRN  - Patient cannot leave with bite block in place per Dental so now pending receipt and application of bite block on 5/16 prior to discharge.     # Thrush   - On nystatin (4/28 - 5/4)   - Continue with mouth care     CARDIOLOGY  # Shock state likely hemorrhagic vs vasoplegia  - Acute renal bleed noted requiring multiple transfusions and pressors in ICU.   - TTE 3/22 with EF 63 with normal LVRVSF with PASP 49  - Weaned off pressors in ICU and complicated by hypertension.   - Continue on norvasc 5 QD   - Monitor BP     # Autonomic vs pain  - Mild HTN and tachypnea noted likely pain induced vs autonomia?   - Continue on dilaudid pushes PRN    PULMONARY  # Respiratory failure   - Presented with SCC and hypoxia likely from anemia with no signs of acute chest, however noted with PEA with hemorrhagic shock post renal bx requiring intubation.   - Prolonged intubation and s/p 7 PORTEX trach on 4/4   - Continue on vent 20/460/6/30 with nebs and chest PT  - Continue on dilaudid PRN for dyssynchrony     GI  # Shock liver vs cirrhosis with sickle cell   - Transaminitis noted in ICU and thought to be second to shock liver likely second to hemorrhagic shock vs cardiac arrest, however LFTs remains elevated with acute on chronic hyperbilirubinemia.   - US ABD suggestive of early cirrhotic changes, cholecystectomy, and CBD 9mm   - CT AP with cirrhosis   - LFTs rising with bromocriptine   - Holding further bromocriptine   - Trend LFTs and bili     # GIB  - Anemia with GIB noted in ICU   - s/p EGD 4/11 with cauterization of gastric ulcer & hemostatic spraying of three duodenal ulcers. Gastric ulcer likely due to NGT trauma.   - Continue on PPI BID   - Monitor stools and HH     # Oropharyngeal dysphagia   - GI unable to place PEG due to hepatomegaly.   - IR unable to place PEG second to no safe window   - s/p open G TUBE placement on 4/18   - Continue on TF    RENAL  # Acute renal failure and HAGMA second to shock state   - Scr on admission was 1.25 (3/15/25) and increased to 6s while in ICU and s/p HD 3/24-3/29 and then again on 4/4.   - L SHILEY removed on 4/15   - Continue on HCO3 1300 TID tabs with improved acidosis, however hypernatremic and stopped.   - Monitor renal function, acidosis, and UOP     # L renal mass bx c/b hematoma   - Renal bx as below on 3/19  - Hemorrhagic shock and PEA arrest on 3/20   - Renal US with large L renal subcapsular hematoma.    - IR left renal angiogram and embolization on 3/20   - CT 4/6 with unchanged large left subcapsular and perinephric hematoma.  - CT A/P (4/20) showing stable subcapsular renal hematoma and NO new active bleeds.  - Monitor for now    # Urinary retention   - Passed TOV on 4/23   - Urinary retention again and likely neurogenic bladder   - Dave placed 5/1   - Hold further TOVs    # Hypernatremia  - s/p D5W   - Continue on  Q6H   - Monitor sodium     # Hypercalcemia likely from dehydration vs immobility vs hyperparathyroidism?   - PTH on 4/20 elevated   - Rehydrated, however calcium remains stagnant  - s/p Dose calcitonin (5/11)  - Started sensipar 30 BID (5/11)  - Continue on rehydration   - PTH (5/12): 30  - pending vitamin D (sent 5/12)  - IVF given w/ lasix 20 x 1 for hypercalcemia corrected calcium 10.8. ionized calcium 1.41 (5/12)    # Hyperphosphatemia   - Phos elevation likely second to renal failure   - PTH elevated, however Phos slowly down trending   - Monitor phos    INFECTIOUS DISEASE   # Central fevers  - Recurrent fevers and complete LVQ empirically as below   - Started on bromocriptine with improvement in fever curve, however noted with transaminitis and bromocriptine stopped with return of elevated temperature intermittently.   - Patient with known R IJ DVT and L kidney hematoma and now new LLE DVT that can cause intermittent fevers.   - Hold work up for now   - Hold further ABX for now   - Monitor fever curve.     # Questionable UTI  - Recurrent fevers with POSITIVE UA and s/p LVQ (4/23 - 4/28)   - Monitor off ABX     # VAP   - Post arrest noted with concern for aspiration and VAP in ICU   - Flu, SCx, BAL, BCx and UCx negative   - MRSA PCR with MSSA and completed bactroban in ICU   - Completed aztreonam (3/22-3/27) then casey (3/27-3/31) and vanco by dose in ICU   - CXR in ICU with RUL consolidation and completed recurrent casey course in ICU (4/2-4/7).     HEMATOLOGIC  # Anemia with SCC vs L kidney bleed vs GIB   - Baseline hemoglobin outpatient ~6.0-7.0 and sent in for anemia down to 5.3 without signs of bleeding and more likely SCC.    - Transfusion given on admission and improved back to baseline.   - Course complicated by hemorrhagic shock from left renal hematoma and HH down to 3.4.  - s/p 10U PRBC total while in ICU   - s/p PRBC pre-GTUBE with HH increased to 8.0  - c/b rapid decline in HH post GTUBE likely normalizing to baseline 6-7 as RPT CT AP post G TUBE with new bleeds and no acute signs of sickling.   - s/p PRBC 4/22, x1 unit 5/13  - Monitor HH     # SCD   - Discontinued deferasirox due to current renal failure   - Monitor Sickle labs QD (CBC with Diff, Retic, BMP, Hepatic Function, LDH and Hapto, and VBG).   - 1U PRBC given (5/13) per Heme/Onc recs     ONCOLOGY   # L renal mass with non-invasive urothelial carcinoma  - Biopsy showing non-invasive urothelial carcinoma   - Case discussed with ONC and given HIE, physical debilitation, and vent dependence patient is not a candidate for DMT.     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   - Discussed with IR recommend holding IVC filter for now   - Continue on DVT PPX HSQ   - LUE and LLE precautions     # LUE arm infiltration   - HCO3 GTT infiltration in ICU   - Seen by hand sx and no compartment syndrome concern   - Monitor for now     # RUE blood infiltration   - RUE blood transfusion infiltration noted on 4/17 with area of ecchymosis   - RPT DOPPLER with known R IJ DVT, however no upper arm thrombus or issues in area of infiltration  - Monitor site for now    ENDOCRINE   # Glycemic control   - Monitor BG   - No hx of DM2     SKIN   - Wound care reccs appreciated    ETHICS   - FULL CODE     DISPO - Hopeful discharge 5/16 after mouthguard applied

## 2025-05-14 NOTE — CHART NOTE - NSCHARTNOTEFT_GEN_A_CORE
-- DO NOT REPLY / DO NOT REPLY ALL --  -- Message is from Engagement Center Operations ([facility]) --    Offered Waitlist if Available for the Visit Type? No    Caller is requesting an appointment - at a sooner time than what was available.      Caller wants sooner appointment - offered other approved options    Reason for Visit: Vertigo     Is the patient currently scheduled? No    Preferred time to be seen:     Caller Information       Type Contact Phone/Fax    10/10/2023 02:24 PM CDT Phone (Incoming) Angy Mendez (Self) [phone] (M)          Alternative phone number:     Can a detailed message be left? Yes    Message Turnaround:     [place]:    Please give this turnaround time to the caller:   \"This message will be sent to [state Provider's name]. The clinical team will fulfill your request as soon as they review your message.\"   Called and spoke with patient's brother Bala this afternoon to provide update regarding patient's eligbility for discharge and inquiry regarding decision on facility.

## 2025-05-14 NOTE — PROGRESS NOTE ADULT - NS ATTEND AMEND GEN_ALL_CORE FT
agree with above  pt is medically stable for d/c  has accepting facilities and insurance auth agree with above  pt is medically stable for d/c  has accepting facilities and insurance auth  [facility] PA d/w pt's brother Bala - who is aware and has chosen a facility - but will only disclose the facility to our SW

## 2025-05-14 NOTE — DISCHARGE NOTE NURSING/CASE MANAGEMENT/SOCIAL WORK - PATIENT PORTAL LINK FT
You can access the FollowMyHealth Patient Portal offered by [facility] by registering at the following website: [email or web address]. By joining [facility]’s FollowMyHealth portal, you will also be able to view your health information using other applications (apps) compatible with our system.

## 2025-05-14 NOTE — CHART NOTE - NSCHARTNOTEFT_GEN_A_CORE
NUTRITION FOLLOW UP NOTE     REASON FOR ASSESSMENT: SEVERE MALNUTRITION FOLLOW UP     SOURCE:    [x] Medical record [x] RN/PCA  [x]Patient inappropriate (disoriented, nonverbal)    MEDICAL COURSE:  Per chart,"43 YO M with PMHx of sickle cell disease with prior acute chest syndrome requiring transfusion and exchange in the past (last 12/2024), iron overload, gout, HTN, and RUE DVT in 12/2024 on eliquis who presented on 3/15 by his hematologist second to anemia and hypoxia, down to 5.3 from baseline 7-8, with concern for vaso-occlusive crisis. While on medicine, anemia improved post transfusion and continued on SCC pain control PCA. Patient noted with left renal mass during prior admission and s/p L ureteroscopy with biopsy and stent placement on 3/19. Course complicated with hypoglycemia on 3/20 with RRT x 2 and found with severe anemia to 3.4 with hemorrhagic shock second to left perirenal and subcapsular hemorrhage and ultimately PEA arrest. ROSC achieved and transferred to MICU. s/p IR emobilization and course complicated anoxic brain injury, myoclonic jerks, GIB, MATA, HAGMA, DVTs (R IJ and LUE brachial), dysphagia, cirrhosis, and prolonged vent time s/p trach on 4/4. Transferred to [facility] on 4/18."      DIET PRESCRIPTION:  Diet, NPO with Tube Feed:   Tube Feeding Modality: Gastrostomy  Nepro with Carb Steady (NEPRORTH)  Total Volume for 24 Hours (mL): 1170  Bolus  Total Volume of Bolus (mL):  195  Total # of Feeds: 6  Tube Feed Frequency: Every 4 hours   Tube Feed Start Time: 05:00  Bolus Feed Rate (mL per Hour): 195   Bolus Feed Duration (in Hours): 1 (05-07-25 @ 19:13)      NUTRITION COURSE:  Unable to conduct nutrition interview 2/2 decreased cognition. Information obtained from comprehensive chart review and per RN today: No reports of any recent episodes of nausea, vomiting, diarrhea or constipation, Last BM noted on 5/13 per RN flowsheets.     Pt remains NPO TF only as sole source of nutrition and hydration;  Nepro bolus feeds @ 195 q4hr (6x/day); TF provides (77kg IBW); 1170ml total volume, 2106 kcal (27 kcal/kg), 95gm protein (1.2gm/kg), 854ml free water from formula- no TF intolerances noted.       PERTINENT MEDICATIONS:  MEDICATIONS  (STANDING):  albuterol/ipratropium for Nebulization 3 milliLiter(s) Nebulizer every 6 hours  amLODIPine   Tablet 5 milliGRAM(s) Oral daily  Biotene Dry Mouth Oral Rinse 15 milliLiter(s) Swish and Spit every 8 hours  chlorhexidine 2% Cloths 1 Application(s) Topical daily  cinacalcet suspension 30 milliGRAM(s) Oral every 12 hours  clonazePAM  Tablet 2 milliGRAM(s) Oral every 8 hours  heparin   Injectable 5000 Unit(s) SubCutaneous every 12 hours  influenza   Vaccine 0.5 milliLiter(s) IntraMuscular once  levETIRAcetam  Solution 500 milliGRAM(s) Oral two times a day  pantoprazole   Suspension 40 milliGRAM(s) Oral every 12 hours  sodium chloride 0.9%. 1000 milliLiter(s) (100 mL/Hr) IV Continuous <Continuous>    MEDICATIONS  (PRN):  acetaminophen   Oral Liquid .. 650 milliGRAM(s) Oral every 6 hours PRN Temp greater or equal to 38C (100.4F), Mild Pain (1 - 3)  HYDROmorphone  Injectable 0.5 milliGRAM(s) IV Push every 4 hours PRN dysynchronny    [x] Relevant notes on medications: none noted    PERTINENT LABS:  05-13 Na141 mmol/L Glu 113 mg/dL[H] K+ 3.7 mmol/L Cr  0.71 mg/dL BUN 36 mg/dL[H] 05-13 Phos 2.6 mg/dL 05-13 Alb 2.9 g/dL[L]  A1C with Estimated Average Glucose Result: 5.5 % (04-22-25 @ 05:55)    [x] Relevant notes on labs: BUN noted elevated, continue to monitor.     ANTHROPOMETRICS:  Weight: Height (cm): 175 (04-18 @ 08:20)  Weight (kg): 105.6 (05-11), 88.8 (04-18 @ 08:20)  BMI (kg/m2): 29 (04-18 @ 08:20)  Weight Assessment: Pt with significant wt gain x1 month? (+19.2%). RD calls into question the accuracy of wt trend. Recommend reweight Pt and/or recalibrate bedscale.     PHYSICAL ASSESSMENT, per flowsheets:  Edema: generalized 1+   Pressure Injury: No PI per wound care 5/2 .    ESTIMATED NEEDS:  [X] No change since previous assessment, Based on Ideal Body Weight +10% 169.4lbs/ 77kg  6235-2839 kcal/d, 25-30 kcal/kg, 92-115gm protein/d, 1.2-1.5gm/kg    PREVIOUS NUTRITION DX: [ x] Malnutrition   Nutrition Diagnosis is [x] ongoing  [ ] resolved [ ] not applicable   New Nutrition Diagnosis: [x] not applicable     EDUCATION:  [ x] N/A 2/2 decreased cognition      RECOMMENDATIONS/INTERVENTIONS:  1) Continue bolus feeding Nepro @ 195ml q4hr (6x/day) to provide same total volume (1170ml per day)   2) Continue to monitor TF tolerance  3) RD to f/u prn       MONITORING & EVALUATING:  Tolerance to diet/supplement, nutrition related lab values, weight trends, BMs/GI distress, hydration status, skin integrity.    Jeanine Massey MS, RDN | Available on [facility] TEAMS | Office #[phone]

## 2025-05-14 NOTE — PROVIDER CONTACT NOTE (OTHER) - BACKGROUND
sickle cell anemia, htn, anoxic brain injury
45y/o M, admitted for anemia. PMH of sickle cell disease, prior DVT
Pt admitted for Anemia
admit diag- anemia
Admitted for anemia and anoxic brain injury.
PMHx of hypertension, DVT, sickle cell anemia
PMHx of hypertension, DVT, sickle cell anemia
anemia
Admitted for anemia and respiratory failure
Hx of anemia
Pt admitted for anemia

## 2025-05-15 PROCEDURE — 71045 X-RAY EXAM CHEST 1 VIEW: CPT | Mod: 26

## 2025-05-15 RX ORDER — CLONAZEPAM 0.5 MG/1
2 TABLET ORAL EVERY 8 HOURS
Refills: 0 | Status: DISCONTINUED | OUTPATIENT
Start: 2025-05-15 | End: 2025-05-16

## 2025-05-15 RX ADMIN — HEPARIN SODIUM 5000 UNIT(S): 1000 INJECTION INTRAVENOUS; SUBCUTANEOUS at 17:04

## 2025-05-15 RX ADMIN — Medication 1 APPLICATION(S): at 15:02

## 2025-05-15 RX ADMIN — Medication 15 MILLILITER(S): at 06:23

## 2025-05-15 RX ADMIN — Medication 40 MILLIGRAM(S): at 06:23

## 2025-05-15 RX ADMIN — Medication 40 MILLIGRAM(S): at 17:04

## 2025-05-15 RX ADMIN — CINACALCET 30 MILLIGRAM(S): 30 TABLET, FILM COATED ORAL at 06:23

## 2025-05-15 RX ADMIN — CLONAZEPAM 2 MILLIGRAM(S): 0.5 TABLET ORAL at 15:02

## 2025-05-15 RX ADMIN — CLONAZEPAM 2 MILLIGRAM(S): 0.5 TABLET ORAL at 06:23

## 2025-05-15 RX ADMIN — IPRATROPIUM BROMIDE AND ALBUTEROL SULFATE 3 MILLILITER(S): .5; 2.5 SOLUTION RESPIRATORY (INHALATION) at 06:45

## 2025-05-15 RX ADMIN — CLONAZEPAM 2 MILLIGRAM(S): 0.5 TABLET ORAL at 23:07

## 2025-05-15 RX ADMIN — IPRATROPIUM BROMIDE AND ALBUTEROL SULFATE 3 MILLILITER(S): .5; 2.5 SOLUTION RESPIRATORY (INHALATION) at 03:22

## 2025-05-15 RX ADMIN — CINACALCET 30 MILLIGRAM(S): 30 TABLET, FILM COATED ORAL at 17:03

## 2025-05-15 RX ADMIN — AMLODIPINE BESYLATE 5 MILLIGRAM(S): 10 TABLET ORAL at 06:24

## 2025-05-15 RX ADMIN — IPRATROPIUM BROMIDE AND ALBUTEROL SULFATE 3 MILLILITER(S): .5; 2.5 SOLUTION RESPIRATORY (INHALATION) at 20:59

## 2025-05-15 RX ADMIN — IPRATROPIUM BROMIDE AND ALBUTEROL SULFATE 3 MILLILITER(S): .5; 2.5 SOLUTION RESPIRATORY (INHALATION) at 15:03

## 2025-05-15 RX ADMIN — LEVETIRACETAM 500 MILLIGRAM(S): 10 INJECTION, SOLUTION INTRAVENOUS at 06:23

## 2025-05-15 RX ADMIN — Medication 15 MILLILITER(S): at 23:06

## 2025-05-15 RX ADMIN — Medication 15 MILLILITER(S): at 15:03

## 2025-05-15 RX ADMIN — LEVETIRACETAM 500 MILLIGRAM(S): 10 INJECTION, SOLUTION INTRAVENOUS at 17:04

## 2025-05-15 RX ADMIN — HEPARIN SODIUM 5000 UNIT(S): 1000 INJECTION INTRAVENOUS; SUBCUTANEOUS at 06:24

## 2025-05-15 NOTE — PROGRESS NOTE ADULT - SUBJECTIVE AND OBJECTIVE BOX
RCU PROGRESS NOTE     CHIEF COMPLAINT: Patient is a 45y old  Male who presents with a chief complaint of Referred by Hematologist for low Hg (14 May 2025 07:13)      INTERVAL EVENTS:    REVIEW OF SYSTEMS: Seen by bedside during AM rounds and     Mode: AC/ CMV (Assist Control/ Continuous Mandatory Ventilation), RR (machine): 20, TV (machine): 460, FiO2: 30, PEEP: 6, MAP: 11, PIP: 23      OBJECTIVE:  ICU Vital Signs Last 24 Hrs  T(C): 37.2 (15 May 2025 06:21), Max: 37.2 (14 May 2025 22:25)  T(F): 98.9 (15 May 2025 06:21), Max: 98.9 (14 May 2025 22:25)  HR: 104 (15 May 2025 06:43) (86 - 108)  BP: 125/101 (15 May 2025 06:21) (125/101 - 156/84)  BP(mean): --  ABP: --  ABP(mean): --  RR: 20 (15 May 2025 06:21) (19 - 20)  SpO2: 100% (15 May 2025 06:43) (100% - 100%)    O2 Parameters below as of 15 May 2025 06:43  Patient On (Oxygen Delivery Method): ventilator          Mode: AC/ CMV (Assist Control/ Continuous Mandatory Ventilation), RR (machine): 20, TV (machine): 460, FiO2: 30, PEEP: 6, MAP: 11, PIP: 23    05-14 @ 07:01  -  05-15 @ 07:00  --------------------------------------------------------  IN: 2490 mL / OUT: 1900 mL / NET: 590 mL      CAPILLARY BLOOD GLUCOSE          HOSPITAL MEDICATIONS:  MEDICATIONS  (STANDING):  albuterol/ipratropium for Nebulization 3 milliLiter(s) Nebulizer every 6 hours  amLODIPine   Tablet 5 milliGRAM(s) Oral daily  Biotene Dry Mouth Oral Rinse 15 milliLiter(s) Swish and Spit every 8 hours  chlorhexidine 2% Cloths 1 Application(s) Topical daily  cinacalcet suspension 30 milliGRAM(s) Oral every 12 hours  clonazePAM  Tablet 2 milliGRAM(s) Oral every 8 hours  heparin   Injectable 5000 Unit(s) SubCutaneous every 12 hours  influenza   Vaccine 0.5 milliLiter(s) IntraMuscular once  levETIRAcetam  Solution 500 milliGRAM(s) Oral two times a day  pantoprazole   Suspension 40 milliGRAM(s) Oral every 12 hours    MEDICATIONS  (PRN):  acetaminophen   Oral Liquid .. 650 milliGRAM(s) Oral every 6 hours PRN Temp greater or equal to 38C (100.4F), Mild Pain (1 - 3)  HYDROmorphone  Injectable 0.5 milliGRAM(s) IV Push every 4 hours PRN dysynchronny      PHYSICAL EXAMINATION    LABS:                        7.3    15.73 )-----------( 247      ( 13 May 2025 15:30 )             21.8                     PAST MEDICAL & SURGICAL HISTORY:  Sickle cell anemia      Gout      Deep vein thrombosis (DVT)      Iron overload      Hypertension      History of cholecystectomy      History of removal of Port-a-Cath          FAMILY HISTORY:  Family history of sickle cell trait (Father, Mother)    Patient's father is  (Father)    Patient's mother is  (Mother)        Social History:  Lives alone (15 Mar 2025 09:54)      RADIOLOGY:  [ ] Reviewed and interpreted by me    PULMONARY FUNCTION TESTS:    EKG: RCU PROGRESS NOTE     CHIEF COMPLAINT: Patient is a 45y old  Male who presents with a chief complaint of Referred by Hematologist for low Hg (14 May 2025 07:13)      INTERVAL EVENTS:  - No interval events overnight   - Bite block out this morning and plan for OMFS replacement   - Bite block mold to be delivered tomorrow   - DISPO planning post bite block delivery     REVIEW OF SYSTEMS: Seen by bedside during AM rounds and unable to assess ROS as with HIE    Mode: AC/ CMV (Assist Control/ Continuous Mandatory Ventilation), RR (machine): 20, TV (machine): 460, FiO2: 30, PEEP: 6, MAP: 11, PIP: 23      OBJECTIVE:  ICU Vital Signs Last 24 Hrs  T(C): 37.2 (15 May 2025 06:21), Max: 37.2 (14 May 2025 22:25)  T(F): 98.9 (15 May 2025 06:21), Max: 98.9 (14 May 2025 22:25)  HR: 104 (15 May 2025 06:43) (86 - 108)  BP: 125/101 (15 May 2025 06:21) (125/101 - 156/84)  BP(mean): --  ABP: --  ABP(mean): --  RR: 20 (15 May 2025 06:21) (19 - 20)  SpO2: 100% (15 May 2025 06:43) (100% - 100%)    O2 Parameters below as of 15 May 2025 06:43  Patient On (Oxygen Delivery Method): ventilator          Mode: AC/ CMV (Assist Control/ Continuous Mandatory Ventilation), RR (machine): 20, TV (machine): 460, FiO2: 30, PEEP: 6, MAP: 11, PIP: 23    05-14 @ 07:01  -  05-15 @ 07:00  --------------------------------------------------------  IN: 2490 mL / OUT: 1900 mL / NET: 590 mL      CAPILLARY BLOOD GLUCOSE          HOSPITAL MEDICATIONS:  MEDICATIONS  (STANDING):  albuterol/ipratropium for Nebulization 3 milliLiter(s) Nebulizer every 6 hours  amLODIPine   Tablet 5 milliGRAM(s) Oral daily  Biotene Dry Mouth Oral Rinse 15 milliLiter(s) Swish and Spit every 8 hours  chlorhexidine 2% Cloths 1 Application(s) Topical daily  cinacalcet suspension 30 milliGRAM(s) Oral every 12 hours  clonazePAM  Tablet 2 milliGRAM(s) Oral every 8 hours  heparin   Injectable 5000 Unit(s) SubCutaneous every 12 hours  influenza   Vaccine 0.5 milliLiter(s) IntraMuscular once  levETIRAcetam  Solution 500 milliGRAM(s) Oral two times a day  pantoprazole   Suspension 40 milliGRAM(s) Oral every 12 hours    MEDICATIONS  (PRN):  acetaminophen   Oral Liquid .. 650 milliGRAM(s) Oral every 6 hours PRN Temp greater or equal to 38C (100.4F), Mild Pain (1 - 3)  HYDROmorphone  Injectable 0.5 milliGRAM(s) IV Push every 4 hours PRN dysynchronny      PHYSICAL EXAMINATION  General: NAD   HEENT: Mouth closed with tongue biting. Tracheostomy present.   Cards: S1/S2, no murmurs   Pulm: Course vent sounds bilaterally. No wheezes.   Abdomen: Soft, nondistended and nontender. PEG (+)   Extremities: No pedal edema. No active SABINO of BL upper and lower extremities.  Neurology: Eyes closed, does not follow commands, and no acute focal neurological deficits     LABS:                        7.3    15.73 )-----------( 247      ( 13 May 2025 15:30 )             21.8                     PAST MEDICAL & SURGICAL HISTORY:  Sickle cell anemia      Gout      Deep vein thrombosis (DVT)      Iron overload      Hypertension      History of cholecystectomy      History of removal of Port-a-Cath          FAMILY HISTORY:  Family history of sickle cell trait (Father, Mother)    Patient's father is  (Father)    Patient's mother is  (Mother)        Social History:  Lives alone (15 Mar 2025 09:54)      RADIOLOGY:  [ ] Reviewed and interpreted by me    PULMONARY FUNCTION TESTS:    EKG:

## 2025-05-15 NOTE — PROGRESS NOTE ADULT - ASSESSMENT
45 YO M with PMHx of sickle cell disease with prior acute chest syndrome requiring transfusion and exchange in the past (last 12/2024), iron overload, gout, HTN, and RUE DVT in 12/2024 on eliquis who presented on 3/15 by his hematologist second to anemia and hypoxia, down to 5.3 from baseline 7-8, with concern for vaso-occlusive crisis. While on medicine, anemia improved post transfusion and continued on SCC pain control PCA. Patient noted with left renal mass during prior admission and s/p L ureteroscopy with biopsy and stent placement on 3/19. Course complicated with hypoglycemia on 3/20 with RRT x 2 and found with severe anemia to 3.4 with hemorrhagic shock second to left perirenal and subcapsular hemorrhage and ultimately PEA arrest. ROSC achieved and transferred to MICU. s/p IR emobilization and course complicated anoxic brain injury, myoclonic jerks, GIB, MATA, HAGMA, DVTs (R IJ and LUE brachial), dysphagia, cirrhosis, and prolonged vent time s/p trach on 4/4. Transferred to [facility] on 4/18.     NEUROLOGY  # Anoxic brain injury   - s/p cardiac arrest on 3/20 with ROSC in 4 minutes   - CT HEAD post arrest with diffuse sulcal effacement with increased intracranial pressure, and few patchy foci of cortical blurring concern for HIE  - MRI brain post arrest with diffuse global abnormal supratentorial cortical restricted diffusion consistent with global hypoxic injury   - Monitor for now    # Seizures vs myoclonic jerks   - Witness myoclonic jerking concerning for seizures with anoxic brain injury  - vEEG with abundant myoclonic seizures in the setting of a severe diffuse/multifocal cerebral dysfunction which can be seen in the setting of sedative effect or due to anoxia  - s/p valium 10 Q4H   - Continue on keppra   - Continue on klonopin 2mg TID   - Continue on dilaudid PRN     # Central fevers   - Persistent fever spikes noted with concern for central fevers   - Bromocriptine started on 4/23, however noted with rise in LFTs and now stopped on 4/27   - improved, monitor fever curve    HEENT  # Tongue biting   - Noted with tongue trauma  - s/p bite block replacement by OMFS on 4/23  - s/p dental mouth guard scan on 5/2   - Mouth guard to be delivered on 5/16   - Dental will continue with mouth care and reposition bite block PRN     # Thrush   - On nystatin (4/28 - 5/4)   - Continue with mouth care     CARDIOLOGY  # Shock state likely hemorrhagic vs vasoplegia  - Acute renal bleed noted requiring multiple transfusions and pressors in ICU.   - TTE 3/22 with EF 63 with normal LVRVSF with PASP 49  - Weaned off pressors in ICU and complicated by hypertension.   - Continue on norvasc 5 QD   - Monitor BP     # Autonomic vs pain  - Mild HTN and tachypnea noted likely pain induced vs autonomia?   - Continue on dilaudid pushes PRN    PULMONARY  # Respiratory failure   - Presented with SCC and hypoxia likely from anemia with no signs of acute chest, however noted with PEA with hemorrhagic shock post renal bx requiring intubation.   - Prolonged intubation and s/p 7 PORTEX trach on 4/4   - Continue on vent 20/460/6/30 with nebs and chest PT    GI  # Shock liver vs cirrhosis with sickle cell   - Transaminitis noted in ICU and thought to be second to shock liver likely second to hemorrhagic shock vs cardiac arrest, however LFTs remains elevated with acute on chronic hyperbilirubinemia.   - US ABD suggestive of early cirrhotic changes, cholecystectomy, and CBD 9mm   - CT AP with cirrhosis   - LFTs rising with bromocriptine   - Holding further bromocriptine   - Trend LFTs    # GIB  - Anemia with GIB noted in ICU   - s/p EGD 4/11 with cauterization of gastric ulcer & hemostatic spraying of three duodenal ulcers. Gastric ulcer likely due to NGT trauma.   - Continue on PPI BID   - Monitor stools and HH     # Oropharyngeal dysphagia   - GI unable to place PEG due to hepatomegaly.   - IR unable to place PEG second to no safe window   - s/p open G TUBE placement on 4/18   - Continue on TF    RENAL  # Acute renal failure and HAGMA second to shock state   - Scr on admission was 1.25 (3/15/25) and increased to 6s while in ICU and s/p HD 3/24-3/29 and then again on 4/4.   - L SHILEY removed on 4/15   - Continue on HCO3 1300 TID tabs with improved acidosis, however hypernatremic and stopped.   - Monitor renal function, acidosis, and UOP     # L renal mass bx c/b hematoma   - Renal bx as below on 3/19  - Hemorrhagic shock and PEA arrest on 3/20   - Renal US with large L renal subcapsular hematoma.    - IR left renal angiogram and embolization on 3/20   - CT 4/6 with unchanged large left subcapsular and perinephric hematoma.  - CT A/P (4/20) showing stable subcapsular renal hematoma and NO new active bleeds.  - Monitor for now    # Urinary retention   - Passed TOV on 4/23   - Urinary retention again and likely neurogenic bladder   - Dave placed 5/1   - Hold further TOVs    # Hypernatremia  - s/p D5W   - Continue on  Q6H   - Monitor sodium     # Hypercalcemia likely from dehydration vs immobility vs hyperparathyroidism?   - PTH on 4/20 elevated   - RPT PTH on 5/12 normal   - Rehydrated, however calcium remained stagnant  - s/p calcitonin on 5/11   - s/p IVF and lasix on 5/12   - Continue on sensipar 30 BID   - Monitor calcium     # Hyperphosphatemia   - Phos elevation likely second to renal failure   - PTH elevated, however Phos slowly down trending   - Monitor phos    INFECTIOUS DISEASE   # Central fevers  - Recurrent fevers and complete LVQ empirically as below   - Started on bromocriptine with improvement in fever curve, however noted with transaminitis and bromocriptine stopped with return of elevated temperature intermittently.   - Patient with known R IJ DVT and L kidney hematoma and now new LLE DVT that can cause intermittent fevers.   - Hold work up for now   - Hold further ABX for now   - Monitor fever curve.     # Questionable UTI  - Recurrent fevers with POSITIVE UA and s/p LVQ (4/23 - 4/28)   - Monitor off ABX     # VAP   - Post arrest noted with concern for aspiration and VAP in ICU   - Flu, SCx, BAL, BCx and UCx negative   - MRSA PCR with MSSA and completed bactroban in ICU   - Completed aztreonam (3/22-3/27) then casey (3/27-3/31) and vanco by dose in ICU   - CXR in ICU with RUL consolidation and completed recurrent casey course in ICU (4/2-4/7).     HEMATOLOGIC  # Anemia with SCC vs L kidney bleed vs GIB   - Baseline hemoglobin outpatient ~6.0-7.0 and sent in for anemia down to 5.3 without signs of bleeding and more likely SCC.    - Transfusion given on admission and improved back to baseline.   - Course complicated by hemorrhagic shock from left renal hematoma and HH down to 3.4.  - s/p 10U PRBC total while in ICU   - s/p PRBC pre-GTUBE   - c/b rapid decline in HH post GTUBE likely normalizing to baseline 6-7 as RPT CT AP post G TUBE with new bleeds and no acute signs of sickling.   - s/p PRBC 4/22 and 5/13  - Baseline HH 6-7  - Monitor HH     # SCD   - Discontinued deferasirox due to current renal failure   - Monitor sickle labs PRN (CBC with Diff, Retic, BMP, Hepatic Function, LDH and Hapto, and VBG).     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   - Discussed with IR recommend holding IVC filter for now   - Continue on DVT PPX HSQ   - LUE and LLE precautions     # LUE arm infiltration   - HCO3 GTT infiltration in ICU   - Seen by hand sx and no compartment syndrome concern   - Monitor for now     # RUE blood infiltration   - RUE blood transfusion infiltration noted on 4/17 with area of ecchymosis   - RPT DOPPLER with known R IJ DVT, however no upper arm thrombus or issues in area of infiltration  - Monitor site for now    ENDOCRINE   # Glycemic control   - Monitor BG   - No hx of DM2     SKIN   - Wound care reccs appreciated    ETHICS   - FULL CODE     DISPO - Plan for DC to NH after mouthguard delivered  43 YO M with PMHx of sickle cell disease with prior acute chest syndrome requiring transfusion and exchange in the past (last 12/2024), iron overload, gout, HTN, and RUE DVT in 12/2024 on eliquis who presented on 3/15 by his hematologist second to anemia and hypoxia, down to 5.3 from baseline 7-8, with concern for vaso-occlusive crisis. While on medicine, anemia improved post transfusion and continued on SCC pain control PCA. Patient noted with left renal mass during prior admission and s/p L ureteroscopy with biopsy and stent placement on 3/19. Course complicated with hypoglycemia on 3/20 with RRT x 2 and found with severe anemia to 3.4 with hemorrhagic shock second to left perirenal and subcapsular hemorrhage and ultimately PEA arrest. ROSC achieved and transferred to MICU. s/p IR emobilization and course complicated anoxic brain injury, myoclonic jerks, GIB, MATA, HAGMA, DVTs (R IJ and LUE brachial), dysphagia, cirrhosis, and prolonged vent time s/p trach on 4/4. Transferred to [facility] on 4/18.     NEUROLOGY  # Anoxic brain injury   - s/p cardiac arrest on 3/20 with ROSC in 4 minutes   - CT HEAD post arrest with diffuse sulcal effacement with increased intracranial pressure, and few patchy foci of cortical blurring concern for HIE  - MRI brain post arrest with diffuse global abnormal supratentorial cortical restricted diffusion consistent with global hypoxic injury   - Monitor for now    # Seizures vs myoclonic jerks   - Witness myoclonic jerking concerning for seizures with anoxic brain injury  - vEEG with abundant myoclonic seizures in the setting of a severe diffuse/multifocal cerebral dysfunction which can be seen in the setting of sedative effect or due to anoxia  - s/p valium 10 Q4H   - Continue on keppra   - Continue on klonopin 2mg TID   - Continue on dilaudid PRN     # Central fevers   - Persistent fever spikes noted with concern for central fevers   - Bromocriptine started on 4/23, however noted with rise in LFTs and now stopped on 4/27   - improved, monitor fever curve    HEENT  # Tongue biting   - Noted with tongue trauma  - s/p bite block placement by OMFS   - s/p dental mouth guard scan on 5/2   - Mouth guard to be delivered on 5/16   - Dental will continue with mouth care and reposition bite block PRN     # Thrush   - On nystatin (4/28 - 5/4)   - Continue with mouth care     CARDIOLOGY  # Shock state likely hemorrhagic vs vasoplegia  - Acute renal bleed noted requiring multiple transfusions and pressors in ICU.   - TTE 3/22 with EF 63 with normal LVRVSF with PASP 49  - Weaned off pressors in ICU and complicated by hypertension.   - Continue on norvasc 5 QD   - Monitor BP     # Autonomic vs pain  - Mild HTN and tachypnea noted likely pain induced vs autonomia?   - Continue on dilaudid pushes PRN    PULMONARY  # Respiratory failure   - Presented with SCC and hypoxia likely from anemia with no signs of acute chest, however noted with PEA with hemorrhagic shock post renal bx requiring intubation.   - Prolonged intubation and s/p 7 PORTEX trach on 4/4   - Continue on vent 20/460/6/30 with nebs and chest PT    GI  # Shock liver vs cirrhosis with sickle cell   - Transaminitis noted in ICU and thought to be second to shock liver likely second to hemorrhagic shock vs cardiac arrest, however LFTs remains elevated with acute on chronic hyperbilirubinemia.   - US ABD suggestive of early cirrhotic changes, cholecystectomy, and CBD 9mm   - CT AP with cirrhosis   - LFTs rising with bromocriptine   - Holding further bromocriptine   - Trend LFTs    # GIB  - Anemia with GIB noted in ICU   - s/p EGD 4/11 with cauterization of gastric ulcer & hemostatic spraying of three duodenal ulcers. Gastric ulcer likely due to NGT trauma.   - Continue on PPI BID   - Monitor stools and HH     # Oropharyngeal dysphagia   - GI unable to place PEG due to hepatomegaly.   - IR unable to place PEG second to no safe window   - s/p open G TUBE placement on 4/18   - Continue on TF    RENAL  # Acute renal failure and HAGMA second to shock state   - Scr on admission was 1.25 (3/15/25) and increased to 6s while in ICU and s/p HD 3/24-3/29 and then again on 4/4.   - L SHILEY removed on 4/15   - Continue on HCO3 1300 TID tabs with improved acidosis, however hypernatremic and stopped.   - Monitor renal function, acidosis, and UOP     # L renal mass bx c/b hematoma   - Renal bx as below on 3/19  - Hemorrhagic shock and PEA arrest on 3/20   - Renal US with large L renal subcapsular hematoma.    - IR left renal angiogram and embolization on 3/20   - CT 4/6 with unchanged large left subcapsular and perinephric hematoma.  - CT A/P (4/20) showing stable subcapsular renal hematoma and NO new active bleeds.  - Monitor for now    # Urinary retention   - Passed TOV on 4/23   - Urinary retention again and likely neurogenic bladder   - Dave placed 5/1   - Hold further TOVs    # Hypernatremia  - s/p D5W   - Continue on  Q6H   - Monitor sodium     # Hypercalcemia likely from dehydration vs immobility vs hyperparathyroidism?   - PTH on 4/20 elevated   - RPT PTH on 5/12 normal   - Rehydrated, however calcium remained stagnant  - s/p calcitonin on 5/11   - s/p IVF and lasix on 5/12   - Continue on sensipar 30 BID   - Monitor calcium     # Hyperphosphatemia   - Phos elevation likely second to renal failure   - PTH elevated, however Phos slowly down trending   - Monitor phos    INFECTIOUS DISEASE   # Central fevers  - Recurrent fevers and complete LVQ empirically as below   - Started on bromocriptine with improvement in fever curve, however noted with transaminitis and bromocriptine stopped with return of elevated temperature intermittently.   - Patient with known R IJ DVT and L kidney hematoma and now new LLE DVT that can cause intermittent fevers.   - Hold work up for now   - Hold further ABX for now   - Monitor fever curve.     # Questionable UTI  - Recurrent fevers with POSITIVE UA and s/p LVQ (4/23 - 4/28)   - Monitor off ABX     # VAP   - Post arrest noted with concern for aspiration and VAP in ICU   - Flu, SCx, BAL, BCx and UCx negative   - MRSA PCR with MSSA and completed bactroban in ICU   - Completed aztreonam (3/22-3/27) then casey (3/27-3/31) and vanco by dose in ICU   - CXR in ICU with RUL consolidation and completed recurrent casey course in ICU (4/2-4/7).     HEMATOLOGIC  # Anemia with SCC vs L kidney bleed vs GIB   - Baseline hemoglobin outpatient ~6.0-7.0 and sent in for anemia down to 5.3 without signs of bleeding and more likely SCC.    - Transfusion given on admission and improved back to baseline.   - Course complicated by hemorrhagic shock from left renal hematoma and HH down to 3.4.  - s/p 10U PRBC total while in ICU   - s/p PRBC pre-GTUBE   - c/b rapid decline in HH post GTUBE likely normalizing to baseline 6-7 as RPT CT AP post G TUBE with new bleeds and no acute signs of sickling.   - s/p PRBC 4/22 and 5/13  - Baseline HH 6-7  - Monitor HH     # SCD   - Discontinued deferasirox due to current renal failure   - Monitor sickle labs PRN (CBC with Diff, Retic, BMP, Hepatic Function, LDH and Hapto, and VBG).     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   - Discussed with IR recommend holding IVC filter for now   - Continue on DVT PPX HSQ   - LUE and LLE precautions     # LUE arm infiltration   - HCO3 GTT infiltration in ICU   - Seen by hand sx and no compartment syndrome concern   - Monitor for now     # RUE blood infiltration   - RUE blood transfusion infiltration noted on 4/17 with area of ecchymosis   - RPT DOPPLER with known R IJ DVT, however no upper arm thrombus or issues in area of infiltration  - Monitor site for now    ENDOCRINE   # Glycemic control   - Monitor BG   - No hx of DM2     SKIN   - Wound care reccs appreciated    ETHICS   - FULL CODE     DISPO - Plan for DC to NH after mouthguard delivered  45 YO M with PMHx of sickle cell disease with prior acute chest syndrome requiring transfusion and exchange in the past (last 12/2024), iron overload, gout, HTN, and RUE DVT in 12/2024 on eliquis who presented on 3/15 by his hematologist second to anemia and hypoxia, down to 5.3 from baseline 7-8, with concern for vaso-occlusive crisis. While on medicine, anemia improved post transfusion and continued on SCC pain control PCA. Patient noted with left renal mass during prior admission and s/p L ureteroscopy with biopsy and stent placement on 3/19. Course complicated with hypoglycemia on 3/20 with RRT x 2 and found with severe anemia to 3.4 with hemorrhagic shock second to left perirenal and subcapsular hemorrhage and ultimately PEA arrest. ROSC achieved and transferred to MICU. s/p IR emobilization and course complicated anoxic brain injury, myoclonic jerks, GIB, MATA, HAGMA, DVTs (R IJ and LUE brachial), dysphagia, cirrhosis, and prolonged vent time s/p trach on 4/4. Transferred to [facility] on 4/18.     NEUROLOGY  # Anoxic brain injury   - s/p cardiac arrest on 3/20 with ROSC in 4 minutes   - CT HEAD post arrest with diffuse sulcal effacement with increased intracranial pressure, and few patchy foci of cortical blurring concern for HIE  - MRI brain post arrest with diffuse global abnormal supratentorial cortical restricted diffusion consistent with global hypoxic injury   - Monitor for now    # Seizures vs myoclonic jerks   - Witness myoclonic jerking concerning for seizures with anoxic brain injury  - vEEG with abundant myoclonic seizures in the setting of a severe diffuse/multifocal cerebral dysfunction which can be seen in the setting of sedative effect or due to anoxia  - s/p valium 10 Q4H   - Continue on keppra   - Continue on klonopin 2mg TID     # Central fevers   - Persistent fever spikes noted with concern for central fevers   - Bromocriptine started on 4/23, however noted with rise in LFTs and now stopped on 4/27   - improved, monitor fever curve    HEENT  # Tongue biting   - Noted with tongue trauma  - s/p bite block placement by OMFS   - s/p dental mouth guard scan on 5/2   - Mouth guard to be delivered on 5/16   - Dental will continue with mouth care and reposition bite block PRN     # Thrush   - On nystatin (4/28 - 5/4)   - Continue with mouth care     CARDIOLOGY  # Shock state likely hemorrhagic vs vasoplegia  - Acute renal bleed noted requiring multiple transfusions and pressors in ICU.   - TTE 3/22 with EF 63 with normal LVRVSF with PASP 49  - Weaned off pressors in ICU and complicated by hypertension.   - Continue on norvasc 5 QD   - Monitor BP     # Autonomic vs pain  - Mild HTN and tachypnea noted likely pain induced vs autonomia?   - Continue on dilaudid pushes PRN    PULMONARY  # Respiratory failure   - Presented with SCC and hypoxia likely from anemia with no signs of acute chest, however noted with PEA with hemorrhagic shock post renal bx requiring intubation.   - Prolonged intubation and s/p 7 PORTEX trach on 4/4   - Continue on vent 20/460/6/30 with nebs and chest PT    GI  # Shock liver vs cirrhosis with sickle cell   - Transaminitis noted in ICU and thought to be second to shock liver likely second to hemorrhagic shock vs cardiac arrest, however LFTs remains elevated with acute on chronic hyperbilirubinemia.   - US ABD suggestive of early cirrhotic changes, cholecystectomy, and CBD 9mm   - CT AP with cirrhosis   - LFTs rising with bromocriptine   - Holding further bromocriptine   - Trend LFTs    # GIB  - Anemia with GIB noted in ICU   - s/p EGD 4/11 with cauterization of gastric ulcer & hemostatic spraying of three duodenal ulcers. Gastric ulcer likely due to NGT trauma.   - Continue on PPI BID   - Monitor stools and HH     # Oropharyngeal dysphagia   - GI unable to place PEG due to hepatomegaly.   - IR unable to place PEG second to no safe window   - s/p open G TUBE placement on 4/18   - Continue on TF    RENAL  # Acute renal failure and HAGMA second to shock state   - Scr on admission was 1.25 (3/15/25) and increased to 6s while in ICU and s/p HD 3/24-3/29 and then again on 4/4.   - L SHILEY removed on 4/15   - Continue on HCO3 1300 TID tabs with improved acidosis, however hypernatremic and stopped.   - Monitor renal function, acidosis, and UOP     # L renal mass bx c/b hematoma   - Renal bx as below on 3/19  - Hemorrhagic shock and PEA arrest on 3/20   - Renal US with large L renal subcapsular hematoma.    - IR left renal angiogram and embolization on 3/20   - CT 4/6 with unchanged large left subcapsular and perinephric hematoma.  - CT A/P (4/20) showing stable subcapsular renal hematoma and NO new active bleeds.  - Monitor for now    # Urinary retention   - Passed TOV on 4/23   - Urinary retention again and likely neurogenic bladder   - Dave placed 5/1   - Hold further TOVs    # Hypernatremia  - s/p D5W   - Continue on  Q6H   - Monitor sodium     # Hypercalcemia likely from dehydration vs immobility vs hyperparathyroidism?   - PTH on 4/20 elevated   - RPT PTH on 5/12 normal   - Rehydrated, however calcium remained stagnant  - s/p calcitonin on 5/11   - s/p IVF and lasix on 5/12   - Continue on sensipar 30 BID   - Monitor calcium     # Hyperphosphatemia   - Phos elevation likely second to renal failure   - PTH elevated, however Phos slowly down trending   - Monitor phos    INFECTIOUS DISEASE   # Central fevers  - Recurrent fevers and complete LVQ empirically as below   - Started on bromocriptine with improvement in fever curve, however noted with transaminitis and bromocriptine stopped with return of elevated temperature intermittently.   - Patient with known R IJ DVT and L kidney hematoma and now new LLE DVT that can cause intermittent fevers.   - Hold work up for now   - Hold further ABX for now   - Monitor fever curve.     # Questionable UTI  - Recurrent fevers with POSITIVE UA and s/p LVQ (4/23 - 4/28)   - Monitor off ABX     # VAP   - Post arrest noted with concern for aspiration and VAP in ICU   - Flu, SCx, BAL, BCx and UCx negative   - MRSA PCR with MSSA and completed bactroban in ICU   - Completed aztreonam (3/22-3/27) then casey (3/27-3/31) and vanco by dose in ICU   - CXR in ICU with RUL consolidation and completed recurrent casey course in ICU (4/2-4/7).     HEMATOLOGIC  # Anemia with SCC vs L kidney bleed vs GIB   - Baseline hemoglobin outpatient ~6.0-7.0 and sent in for anemia down to 5.3 without signs of bleeding and more likely SCC.    - Transfusion given on admission and improved back to baseline.   - Course complicated by hemorrhagic shock from left renal hematoma and HH down to 3.4.  - s/p 10U PRBC total while in ICU   - s/p PRBC pre-GTUBE   - c/b rapid decline in HH post GTUBE likely normalizing to baseline 6-7 as RPT CT AP post G TUBE with new bleeds and no acute signs of sickling.   - s/p PRBC 4/22 and 5/13  - Baseline HH 6-7  - Monitor HH     # SCD   - Discontinued deferasirox due to current renal failure   - Monitor sickle labs PRN (CBC with Diff, Retic, BMP, Hepatic Function, LDH and Hapto, and VBG).     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   - Discussed with IR recommend holding IVC filter for now   - Continue on DVT PPX HSQ   - LUE and LLE precautions     # LUE arm infiltration   - HCO3 GTT infiltration in ICU   - Seen by hand sx and no compartment syndrome concern   - Monitor for now     # RUE blood infiltration   - RUE blood transfusion infiltration noted on 4/17 with area of ecchymosis   - RPT DOPPLER with known R IJ DVT, however no upper arm thrombus or issues in area of infiltration  - Monitor site for now    ENDOCRINE   # Glycemic control   - Monitor BG   - No hx of DM2     SKIN   - Wound care reccs appreciated    ETHICS   - FULL CODE     DISPO - Plan for DC to [facility] after mouthguard delivered

## 2025-05-16 VITALS
OXYGEN SATURATION: 100 % | HEART RATE: 101 BPM | SYSTOLIC BLOOD PRESSURE: 141 MMHG | RESPIRATION RATE: 20 BRPM | TEMPERATURE: 99 F | DIASTOLIC BLOOD PRESSURE: 85 MMHG

## 2025-05-16 PROCEDURE — 70491 CT SOFT TISSUE NECK W/DYE: CPT | Mod: 26

## 2025-05-16 PROCEDURE — 99233 SBSQ HOSP IP/OBS HIGH 50: CPT

## 2025-05-16 RX ADMIN — Medication 40 MILLIGRAM(S): at 17:21

## 2025-05-16 RX ADMIN — CLONAZEPAM 2 MILLIGRAM(S): 0.5 TABLET ORAL at 05:38

## 2025-05-16 RX ADMIN — IPRATROPIUM BROMIDE AND ALBUTEROL SULFATE 3 MILLILITER(S): .5; 2.5 SOLUTION RESPIRATORY (INHALATION) at 08:44

## 2025-05-16 RX ADMIN — AMLODIPINE BESYLATE 5 MILLIGRAM(S): 10 TABLET ORAL at 05:38

## 2025-05-16 RX ADMIN — CINACALCET 30 MILLIGRAM(S): 30 TABLET, FILM COATED ORAL at 05:39

## 2025-05-16 RX ADMIN — Medication 1 APPLICATION(S): at 11:20

## 2025-05-16 RX ADMIN — IPRATROPIUM BROMIDE AND ALBUTEROL SULFATE 3 MILLILITER(S): .5; 2.5 SOLUTION RESPIRATORY (INHALATION) at 03:22

## 2025-05-16 RX ADMIN — HEPARIN SODIUM 5000 UNIT(S): 1000 INJECTION INTRAVENOUS; SUBCUTANEOUS at 05:39

## 2025-05-16 RX ADMIN — LEVETIRACETAM 500 MILLIGRAM(S): 10 INJECTION, SOLUTION INTRAVENOUS at 17:21

## 2025-05-16 RX ADMIN — Medication 15 MILLILITER(S): at 11:21

## 2025-05-16 RX ADMIN — Medication 40 MILLIGRAM(S): at 06:57

## 2025-05-16 RX ADMIN — HEPARIN SODIUM 5000 UNIT(S): 1000 INJECTION INTRAVENOUS; SUBCUTANEOUS at 17:21

## 2025-05-16 RX ADMIN — Medication 15 MILLILITER(S): at 05:39

## 2025-05-16 RX ADMIN — CLONAZEPAM 2 MILLIGRAM(S): 0.5 TABLET ORAL at 11:20

## 2025-05-16 RX ADMIN — LEVETIRACETAM 500 MILLIGRAM(S): 10 INJECTION, SOLUTION INTRAVENOUS at 05:38

## 2025-05-16 RX ADMIN — CINACALCET 30 MILLIGRAM(S): 30 TABLET, FILM COATED ORAL at 17:21

## 2025-05-16 RX ADMIN — IPRATROPIUM BROMIDE AND ALBUTEROL SULFATE 3 MILLILITER(S): .5; 2.5 SOLUTION RESPIRATORY (INHALATION) at 15:26

## 2025-05-16 NOTE — PROVIDER CONTACT NOTE (OTHER) - ACTION/TREATMENT ORDERED:
IV tylenol ordered and cooling blanket, No new orders for BP
Provider notified/ Ordered PO Tylenol
provider notified
provider notified, give IV tylenol and PRN diluadid and re-assess in 1 hour
Provider notified
Verbal order to continue to monitor, do EKG if HR becomes irregular.
Provider made aware
Provider notified
KENAN Nichole rescheduled CAT Scan for tomorrow.
Verbal order to give Dilaudid IV
KENAN LERMA SAID to straight cath. pt has condom cath and peed through the condom cath KENAN Lerma was aware of not needing to straight cath pt.
As per provider, administer prn Dilaudid
Provider aware, will order tylenol.
RESident doctor put bite block in pt mouth. KENAN Knapp made aware.
Provider made aware, no new orders at this time.

## 2025-05-16 NOTE — PROGRESS NOTE ADULT - ASSESSMENT
45 YO M with PMHx of sickle cell disease with prior acute chest syndrome requiring transfusion and exchange in the past (last 12/2024), iron overload, gout, HTN, and RUE DVT in 12/2024 on eliquis who presented on 3/15 by his hematologist second to anemia and hypoxia, down to 5.3 from baseline 7-8, with concern for vaso-occlusive crisis. While on medicine, anemia improved post transfusion and continued on SCC pain control PCA. Patient noted with left renal mass during prior admission and s/p L ureteroscopy with biopsy and stent placement on 3/19. Course complicated with hypoglycemia on 3/20 with RRT x 2 and found with severe anemia to 3.4 with hemorrhagic shock second to left perirenal and subcapsular hemorrhage and ultimately PEA arrest. ROSC achieved and transferred to MICU. s/p IR emobilization and course complicated anoxic brain injury, myoclonic jerks, GIB, MATA, HAGMA, DVTs (R IJ and LUE brachial), dysphagia, cirrhosis, and prolonged vent time s/p trach on 4/4. Transferred to [facility] on 4/18.     NEUROLOGY  # Anoxic brain injury   - s/p cardiac arrest on 3/20 with ROSC in 4 minutes   - CT HEAD post arrest with diffuse sulcal effacement with increased intracranial pressure, and few patchy foci of cortical blurring concern for HIE  - MRI brain post arrest with diffuse global abnormal supratentorial cortical restricted diffusion consistent with global hypoxic injury   - Monitor for now    # Seizures vs myoclonic jerks   - Witness myoclonic jerking concerning for seizures with anoxic brain injury  - vEEG with abundant myoclonic seizures in the setting of a severe diffuse/multifocal cerebral dysfunction which can be seen in the setting of sedative effect or due to anoxia  - s/p valium 10 Q4H   - Continue on keppra   - Continue on klonopin 2mg TID     # Central fevers   - Persistent fever spikes noted with concern for central fevers   - Bromocriptine started on 4/23, however noted with rise in LFTs and now stopped on 4/27   - improved, monitor fever curve    HEENT  # Tongue biting   - Noted with tongue trauma  - s/p bite block placement by OMFS   - s/p dental mouth guard scan on 5/2   - Mouth guard to be delivered on 5/16   - Dental will continue with mouth care and reposition bite block PRN     # Thrush   - On nystatin (4/28 - 5/4)   - Continue with mouth care     CARDIOLOGY  # Shock state likely hemorrhagic vs vasoplegia  - Acute renal bleed noted requiring multiple transfusions and pressors in ICU.   - TTE 3/22 with EF 63 with normal LVRVSF with PASP 49  - Weaned off pressors in ICU and complicated by hypertension.   - Continue on norvasc 5 QD   - Monitor BP     # Autonomic vs pain  - Mild HTN and tachypnea noted likely pain induced vs autonomia?   - Continue on dilaudid pushes PRN    PULMONARY  # Respiratory failure   - Presented with SCC and hypoxia likely from anemia with no signs of acute chest, however noted with PEA with hemorrhagic shock post renal bx requiring intubation.   - Prolonged intubation and s/p 7 PORTEX trach on 4/4   - Continue on vent 20/460/6/30 with nebs and chest PT    GI  # Shock liver vs cirrhosis with sickle cell   - Transaminitis noted in ICU and thought to be second to shock liver likely second to hemorrhagic shock vs cardiac arrest, however LFTs remains elevated with acute on chronic hyperbilirubinemia.   - US ABD suggestive of early cirrhotic changes, cholecystectomy, and CBD 9mm   - CT AP with cirrhosis   - LFTs rising with bromocriptine   - Holding further bromocriptine   - Trend LFTs    # GIB  - Anemia with GIB noted in ICU   - s/p EGD 4/11 with cauterization of gastric ulcer & hemostatic spraying of three duodenal ulcers. Gastric ulcer likely due to NGT trauma.   - Continue on PPI BID   - Monitor stools and HH     # Oropharyngeal dysphagia   - GI unable to place PEG due to hepatomegaly.   - IR unable to place PEG second to no safe window   - s/p open G TUBE placement on 4/18   - Continue on TF    RENAL  # Acute renal failure and HAGMA second to shock state   - Scr on admission was 1.25 (3/15/25) and increased to 6s while in ICU and s/p HD 3/24-3/29 and then again on 4/4.   - L SHILEY removed on 4/15   - Continue on HCO3 1300 TID tabs with improved acidosis, however hypernatremic and stopped.   - Monitor renal function, acidosis, and UOP     # L renal mass bx c/b hematoma   - Renal bx as below on 3/19  - Hemorrhagic shock and PEA arrest on 3/20   - Renal US with large L renal subcapsular hematoma.    - IR left renal angiogram and embolization on 3/20   - CT 4/6 with unchanged large left subcapsular and perinephric hematoma.  - CT A/P (4/20) showing stable subcapsular renal hematoma and NO new active bleeds.  - Monitor for now    # Urinary retention   - Passed TOV on 4/23   - Urinary retention again and likely neurogenic bladder   - Dave placed 5/1   - Hold further TOVs    # Hypernatremia  - s/p D5W   - Continue on  Q6H   - Monitor sodium     # Hypercalcemia likely from dehydration vs immobility vs hyperparathyroidism?   - PTH on 4/20 elevated   - RPT PTH on 5/12 normal   - Rehydrated, however calcium remained stagnant  - s/p calcitonin on 5/11   - s/p IVF and lasix on 5/12   - Continue on sensipar 30 BID   - Monitor calcium     # Hyperphosphatemia   - Phos elevation likely second to renal failure   - PTH elevated, however Phos slowly down trending   - Monitor phos    INFECTIOUS DISEASE   # Central fevers  - Recurrent fevers and complete LVQ empirically as below   - Started on bromocriptine with improvement in fever curve, however noted with transaminitis and bromocriptine stopped with return of elevated temperature intermittently.   - Patient with known R IJ DVT and L kidney hematoma and now new LLE DVT that can cause intermittent fevers.   - Hold work up for now   - Hold further ABX for now   - Monitor fever curve.     # Questionable UTI  - Recurrent fevers with POSITIVE UA and s/p LVQ (4/23 - 4/28)   - Monitor off ABX     # VAP   - Post arrest noted with concern for aspiration and VAP in ICU   - Flu, SCx, BAL, BCx and UCx negative   - MRSA PCR with MSSA and completed bactroban in ICU   - Completed aztreonam (3/22-3/27) then casey (3/27-3/31) and vanco by dose in ICU   - CXR in ICU with RUL consolidation and completed recurrent casey course in ICU (4/2-4/7).     HEMATOLOGIC  # Anemia with SCC vs L kidney bleed vs GIB   - Baseline hemoglobin outpatient ~6.0-7.0 and sent in for anemia down to 5.3 without signs of bleeding and more likely SCC.    - Transfusion given on admission and improved back to baseline.   - Course complicated by hemorrhagic shock from left renal hematoma and HH down to 3.4.  - s/p 10U PRBC total while in ICU   - s/p PRBC pre-GTUBE   - c/b rapid decline in HH post GTUBE likely normalizing to baseline 6-7 as RPT CT AP post G TUBE with new bleeds and no acute signs of sickling.   - s/p PRBC 4/22 and 5/13  - Baseline HH 6-7  - Monitor HH     # SCD   - Discontinued deferasirox due to current renal failure   - Monitor sickle labs PRN (CBC with Diff, Retic, BMP, Hepatic Function, LDH and Hapto, and VBG).     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   - Discussed with IR recommend holding IVC filter for now   - Continue on DVT PPX HSQ   - LUE and LLE precautions     # LUE arm infiltration   - HCO3 GTT infiltration in ICU   - Seen by hand sx and no compartment syndrome concern   - Monitor for now     # RUE blood infiltration   - RUE blood transfusion infiltration noted on 4/17 with area of ecchymosis   - RPT DOPPLER with known R IJ DVT, however no upper arm thrombus or issues in area of infiltration  - Monitor site for now    ENDOCRINE   # Glycemic control   - Monitor BG   - No hx of DM2     SKIN   - Wound care reccs appreciated    ETHICS   - FULL CODE     DISPO - Plan for DC to NH after mouthguard delivered  45 YO M with PMHx of sickle cell disease with prior acute chest syndrome requiring transfusion and exchange in the past (last 12/2024), iron overload, gout, HTN, and RUE DVT in 12/2024 on eliquis who presented on 3/15 by his hematologist second to anemia and hypoxia, down to 5.3 from baseline 7-8, with concern for vaso-occlusive crisis. While on medicine, anemia improved post transfusion and continued on SCC pain control PCA. Patient noted with left renal mass during prior admission and s/p L ureteroscopy with biopsy and stent placement on 3/19. Course complicated with hypoglycemia on 3/20 with RRT x 2 and found with severe anemia to 3.4 with hemorrhagic shock second to left perirenal and subcapsular hemorrhage and ultimately PEA arrest. ROSC achieved and transferred to MICU. s/p IR emobilization and course complicated anoxic brain injury, myoclonic jerks, GIB, MATA, HAGMA, DVTs (R IJ and LUE brachial), dysphagia, cirrhosis, and prolonged vent time s/p trach on 4/4. Transferred to [facility] on 4/18.     NEUROLOGY  # Anoxic brain injury   - s/p cardiac arrest on 3/20 with ROSC in 4 minutes   - CT HEAD post arrest with diffuse sulcal effacement with increased intracranial pressure, and few patchy foci of cortical blurring concern for HIE  - MRI brain post arrest with diffuse global abnormal supratentorial cortical restricted diffusion consistent with global hypoxic injury   - Monitor for now    # Seizures vs myoclonic jerks   - Witness myoclonic jerking concerning for seizures with anoxic brain injury  - vEEG with abundant myoclonic seizures in the setting of a severe diffuse/multifocal cerebral dysfunction which can be seen in the setting of sedative effect or due to anoxia  - s/p valium 10 Q4H   - Continue on keppra   - Continue on klonopin 2mg TID     # Central fevers   - Persistent fever spikes noted with concern for central fevers   - Bromocriptine started on 4/23, however noted with rise in LFTs and now stopped on 4/27   - improved, monitor fever curve    HEENT  # Tongue biting   - Noted with tongue trauma  - s/p bite block placement by OMFS   - s/p dental mouth guard scan on 5/2   - Mouth guard to be delivered on 5/16   - Dental will continue with mouth care and reposition bite block PRN     # Thrush   - On nystatin (4/28 - 5/4)   - Continue with mouth care     # Facial swelling   - No crepitus noted   - CXR without SQ air   - Foul smelling discharge with bite block change   - Pending CT NECK     CARDIOLOGY  # Shock state likely hemorrhagic vs vasoplegia  - Acute renal bleed noted requiring multiple transfusions and pressors in ICU.   - TTE 3/22 with EF 63 with normal LVRVSF with PASP 49  - Weaned off pressors in ICU and complicated by hypertension.   - Continue on norvasc 5 QD   - Monitor BP     # Autonomic vs pain  - Mild HTN and tachypnea noted likely pain induced vs autonomia?   - Continue on dilaudid pushes PRN    PULMONARY  # Respiratory failure   - Presented with SCC and hypoxia likely from anemia with no signs of acute chest, however noted with PEA with hemorrhagic shock post renal bx requiring intubation.   - Prolonged intubation and s/p 7 PORTEX trach on 4/4   - Continue on vent 20/460/6/30 with nebs and chest PT    GI  # Shock liver vs cirrhosis with sickle cell   - Transaminitis noted in ICU and thought to be second to shock liver likely second to hemorrhagic shock vs cardiac arrest, however LFTs remains elevated with acute on chronic hyperbilirubinemia.   - US ABD suggestive of early cirrhotic changes, cholecystectomy, and CBD 9mm   - CT AP with cirrhosis   - LFTs rising with bromocriptine   - Holding further bromocriptine   - Trend LFTs    # GIB  - Anemia with GIB noted in ICU   - s/p EGD 4/11 with cauterization of gastric ulcer & hemostatic spraying of three duodenal ulcers. Gastric ulcer likely due to NGT trauma.   - Continue on PPI BID   - Monitor stools and HH     # Oropharyngeal dysphagia   - GI unable to place PEG due to hepatomegaly.   - IR unable to place PEG second to no safe window   - s/p open G TUBE placement on 4/18   - Continue on TF    RENAL  # Acute renal failure and HAGMA second to shock state   - Scr on admission was 1.25 (3/15/25) and increased to 6s while in ICU and s/p HD 3/24-3/29 and then again on 4/4.   - L SHILEY removed on 4/15   - Continue on HCO3 1300 TID tabs with improved acidosis, however hypernatremic and stopped.   - Monitor renal function, acidosis, and UOP     # L renal mass bx c/b hematoma   - Renal bx as below on 3/19  - Hemorrhagic shock and PEA arrest on 3/20   - Renal US with large L renal subcapsular hematoma.    - IR left renal angiogram and embolization on 3/20   - CT 4/6 with unchanged large left subcapsular and perinephric hematoma.  - CT A/P (4/20) showing stable subcapsular renal hematoma and NO new active bleeds.  - Monitor for now    # Urinary retention   - Passed TOV on 4/23   - Urinary retention again and likely neurogenic bladder   - Dave placed 5/1   - Hold further TOVs    # Hypernatremia  - s/p D5W   - Continue on  Q6H   - Monitor sodium     # Hypercalcemia likely from dehydration vs immobility vs hyperparathyroidism?   - PTH on 4/20 elevated   - RPT PTH on 5/12 normal   - Rehydrated, however calcium remained stagnant  - s/p calcitonin on 5/11   - s/p IVF and lasix on 5/12   - Continue on sensipar 30 BID   - Monitor calcium     # Hyperphosphatemia   - Phos elevation likely second to renal failure   - PTH elevated, however Phos slowly down trending   - Monitor phos    INFECTIOUS DISEASE   # Central fevers  - Recurrent fevers and complete LVQ empirically as below   - Started on bromocriptine with improvement in fever curve, however noted with transaminitis and bromocriptine stopped with return of elevated temperature intermittently.   - Patient with known R IJ DVT and L kidney hematoma and now new LLE DVT that can cause intermittent fevers.   - Hold work up for now   - Hold further ABX for now   - Monitor fever curve.     # Questionable UTI  - Recurrent fevers with POSITIVE UA and s/p LVQ (4/23 - 4/28)   - Monitor off ABX     # VAP   - Post arrest noted with concern for aspiration and VAP in ICU   - Flu, SCx, BAL, BCx and UCx negative   - MRSA PCR with MSSA and completed bactroban in ICU   - Completed aztreonam (3/22-3/27) then casey (3/27-3/31) and vanco by dose in ICU   - CXR in ICU with RUL consolidation and completed recurrent casey course in ICU (4/2-4/7).     HEMATOLOGIC  # Anemia with SCC vs L kidney bleed vs GIB   - Baseline hemoglobin outpatient ~6.0-7.0 and sent in for anemia down to 5.3 without signs of bleeding and more likely SCC.    - Transfusion given on admission and improved back to baseline.   - Course complicated by hemorrhagic shock from left renal hematoma and HH down to 3.4.  - s/p 10U PRBC total while in ICU   - s/p PRBC pre-GTUBE   - c/b rapid decline in HH post GTUBE likely normalizing to baseline 6-7 as RPT CT AP post G TUBE with new bleeds and no acute signs of sickling.   - s/p PRBC 4/22 and 5/13  - Baseline HH 6-7  - Monitor HH     # SCD   - Discontinued deferasirox due to current renal failure   - Monitor sickle labs PRN (CBC with Diff, Retic, BMP, Hepatic Function, LDH and Hapto, and VBG).     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   - Discussed with IR recommend holding IVC filter for now   - Continue on DVT PPX HSQ   - LUE and LLE precautions     # LUE arm infiltration   - HCO3 GTT infiltration in ICU   - Seen by hand sx and no compartment syndrome concern   - Monitor for now     # RUE blood infiltration   - RUE blood transfusion infiltration noted on 4/17 with area of ecchymosis   - RPT DOPPLER with known R IJ DVT, however no upper arm thrombus or issues in area of infiltration  - Monitor site for now    ENDOCRINE   # Glycemic control   - Monitor BG   - No hx of DM2     SKIN   - Wound care reccs appreciated    ETHICS   - FULL CODE     DISPO - Plan for DC to NH after mouthguard delivered  43 YO M with PMHx of sickle cell disease with prior acute chest syndrome requiring transfusion and exchange in the past (last 12/2024), iron overload, gout, HTN, and RUE DVT in 12/2024 on eliquis who presented on 3/15 by his hematologist second to anemia and hypoxia, down to 5.3 from baseline 7-8, with concern for vaso-occlusive crisis. While on medicine, anemia improved post transfusion and continued on SCC pain control PCA. Patient noted with left renal mass during prior admission and s/p L ureteroscopy with biopsy and stent placement on 3/19. Course complicated with hypoglycemia on 3/20 with RRT x 2 and found with severe anemia to 3.4 with hemorrhagic shock second to left perirenal and subcapsular hemorrhage and ultimately PEA arrest. ROSC achieved and transferred to MICU. s/p IR emobilization and course complicated anoxic brain injury, myoclonic jerks, GIB, MATA, HAGMA, DVTs (R IJ and LUE brachial), dysphagia, cirrhosis, and prolonged vent time s/p trach on 4/4. Transferred to [facility] on 4/18.     NEUROLOGY  # Anoxic brain injury   - s/p cardiac arrest on 3/20 with ROSC in 4 minutes   - CT HEAD post arrest with diffuse sulcal effacement with increased intracranial pressure, and few patchy foci of cortical blurring concern for HIE  - MRI brain post arrest with diffuse global abnormal supratentorial cortical restricted diffusion consistent with global hypoxic injury   - Monitor for now    # Seizures vs myoclonic jerks   - Witness myoclonic jerking concerning for seizures with anoxic brain injury  - vEEG with abundant myoclonic seizures in the setting of a severe diffuse/multifocal cerebral dysfunction which can be seen in the setting of sedative effect or due to anoxia  - s/p valium 10 Q4H   - Continue on keppra   - Continue on klonopin 2mg TID     # Central fevers   - Persistent fever spikes noted with concern for central fevers   - Bromocriptine started on 4/23, however noted with rise in LFTs and now stopped on 4/27   - improved, monitor fever curve    HEENT  # Tongue biting   - Noted with tongue trauma  - s/p bite block placement by OMFS   - s/p dental mouth guard scan on 5/2   - Mouth guard to be delivered today    # Thrush   - On nystatin (4/28 - 5/4)   - Continue with mouth care     # Facial swelling   - No crepitus noted   - CXR without SQ air   - Foul smelling discharge with bite block change   - CT NECK with diffuse tongue swelling and perimandibular and facial soft tissue swelling without drainable abscess.  - Bite block placed back in and facial swelling resolved   - Continue on oral care and hygiene with biotene.  - Continue on bite block to prevent further swelling.     CARDIOLOGY  # Shock state likely hemorrhagic vs vasoplegia  - Acute renal bleed noted requiring multiple transfusions and pressors in ICU.   - TTE 3/22 with EF 63 with normal LVRVSF with PASP 49  - Weaned off pressors in ICU and complicated by hypertension.   - Continue on norvasc 5 QD   - Monitor BP     # Autonomic vs pain  - Mild HTN and tachypnea noted likely pain induced vs autonomia?   - Continue on dilaudid pushes PRN    PULMONARY  # Respiratory failure   - Presented with SCC and hypoxia likely from anemia with no signs of acute chest, however noted with PEA with hemorrhagic shock post renal bx requiring intubation.   - Prolonged intubation and s/p 7 PORTEX trach on 4/4   - Continue on vent 20/460/6/30 with nebs and chest PT    GI  # Shock liver vs cirrhosis with sickle cell   - Transaminitis noted in ICU and thought to be second to shock liver likely second to hemorrhagic shock vs cardiac arrest, however LFTs remains elevated with acute on chronic hyperbilirubinemia.   - US ABD suggestive of early cirrhotic changes, cholecystectomy, and CBD 9mm   - CT AP with cirrhosis   - LFTs rising with bromocriptine   - Holding further bromocriptine   - Trend LFTs    # GIB  - Anemia with GIB noted in ICU   - s/p EGD 4/11 with cauterization of gastric ulcer & hemostatic spraying of three duodenal ulcers. Gastric ulcer likely due to NGT trauma.   - Continue on PPI BID   - Monitor stools and HH     # Oropharyngeal dysphagia   - GI unable to place PEG due to hepatomegaly.   - IR unable to place PEG second to no safe window   - s/p open G TUBE placement on 4/18   - Continue on TF    RENAL  # Acute renal failure and HAGMA second to shock state   - Scr on admission was 1.25 (3/15/25) and increased to 6s while in ICU and s/p HD 3/24-3/29 and then again on 4/4.   - L SHILEY removed on 4/15   - Continue on HCO3 1300 TID tabs with improved acidosis, however hypernatremic and stopped.   - Monitor renal function, acidosis, and UOP     # L renal mass bx c/b hematoma   - Renal bx as below on 3/19  - Hemorrhagic shock and PEA arrest on 3/20   - Renal US with large L renal subcapsular hematoma.    - IR left renal angiogram and embolization on 3/20   - CT 4/6 with unchanged large left subcapsular and perinephric hematoma.  - CT A/P (4/20) showing stable subcapsular renal hematoma and NO new active bleeds.  - Monitor for now    # Urinary retention   - Passed TOV on 4/23   - Urinary retention again and likely neurogenic bladder   - Dave placed 5/1   - Hold further TOVs    # Hypernatremia  - s/p D5W   - Continue on  Q6H   - Monitor sodium     # Hypercalcemia likely from dehydration vs immobility vs hyperparathyroidism?   - PTH on 4/20 elevated   - RPT PTH on 5/12 normal   - Rehydrated, however calcium remained stagnant  - s/p calcitonin on 5/11   - s/p IVF and lasix on 5/12   - Continue on sensipar 30 BID   - Monitor calcium     # Hyperphosphatemia   - Phos elevation likely second to renal failure   - PTH elevated, however Phos slowly down trending   - Monitor phos    INFECTIOUS DISEASE   # Central fevers  - Recurrent fevers and complete LVQ empirically as below   - Started on bromocriptine with improvement in fever curve, however noted with transaminitis and bromocriptine stopped with return of elevated temperature intermittently.   - Patient with known R IJ DVT and L kidney hematoma and now new LLE DVT that can cause intermittent fevers.   - Hold work up for now   - Hold further ABX for now   - Monitor fever curve.     # Questionable UTI  - Recurrent fevers with POSITIVE UA and s/p LVQ (4/23 - 4/28)   - Monitor off ABX     # VAP   - Post arrest noted with concern for aspiration and VAP in ICU   - Flu, SCx, BAL, BCx and UCx negative   - MRSA PCR with MSSA and completed bactroban in ICU   - Completed aztreonam (3/22-3/27) then casey (3/27-3/31) and vanco by dose in ICU   - CXR in ICU with RUL consolidation and completed recurrent casey course in ICU (4/2-4/7).     HEMATOLOGIC  # Anemia with SCC vs L kidney bleed vs GIB   - Baseline hemoglobin outpatient ~6.0-7.0 and sent in for anemia down to 5.3 without signs of bleeding and more likely SCC.    - Transfusion given on admission and improved back to baseline.   - Course complicated by hemorrhagic shock from left renal hematoma and HH down to 3.4.  - s/p 10U PRBC total while in ICU   - s/p PRBC pre-GTUBE   - c/b rapid decline in HH post GTUBE likely normalizing to baseline 6-7 as RPT CT AP post G TUBE with new bleeds and no acute signs of sickling.   - s/p PRBC 4/22 and 5/13  - Baseline HH 6-7  - Monitor HH     # SCD   - Discontinued deferasirox due to current renal failure   - Monitor sickle labs PRN (CBC with Diff, Retic, BMP, Hepatic Function, LDH and Hapto, and VBG).     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   - Discussed with IR recommend holding IVC filter for now   - Continue on DVT PPX HSQ   - LUE and LLE precautions     # LUE arm infiltration   - HCO3 GTT infiltration in ICU   - Seen by hand sx and no compartment syndrome concern   - Monitor for now     # RUE blood infiltration   - RUE blood transfusion infiltration noted on 4/17 with area of ecchymosis   - RPT DOPPLER with known R IJ DVT, however no upper arm thrombus or issues in area of infiltration  - Monitor site for now    ENDOCRINE   # Glycemic control   - Monitor BG   - No hx of DM2     SKIN   - Wound care reccs appreciated    ETHICS   - FULL CODE     DISPO - Plan for DC to [facility] TODAY

## 2025-05-16 NOTE — PROVIDER CONTACT NOTE (OTHER) - REASON
pt had bite lock reinserted.
BP of 166/91 (112) and axillary temp of 99.2 at 0400
Elevated HR and /92
PT CT scan scheduled .
Tachycardic with 
Temp of 100.5
Patient 
Sinus Tachy with 
pt is febrile and high BP
Hematocrit 20.2
Tachycardia
BLADDER SCAN SHOWED 509
Hemo 6.9, Hematocrit 21
bite block out of place

## 2025-05-16 NOTE — PROGRESS NOTE ADULT - SUBJECTIVE AND OBJECTIVE BOX
RCU PROGRESS NOTE     CHIEF COMPLAINT: Patient is a 45y old  Male who presents with a chief complaint of Referred by Hematologist for low Hg (14 May 2025 07:13)      INTERVAL EVENTS:  - No interval events overnight   - DISPO planning post bite block delivery     REVIEW OF SYSTEMS: Seen by bedside during AM rounds and unable to assess ROS as with HIE    Mode: AC/ CMV (Assist Control/ Continuous Mandatory Ventilation), RR (machine): 20, TV (machine): 460, FiO2: 30, PEEP: 6, MAP: 11, PIP: 23      OBJECTIVE:  ICU Vital Signs Last 24 Hrs  T(C): 37.2 (15 May 2025 06:21), Max: 37.2 (14 May 2025 22:25)  T(F): 98.9 (15 May 2025 06:21), Max: 98.9 (14 May 2025 22:25)  HR: 104 (15 May 2025 06:43) (86 - 108)  BP: 125/101 (15 May 2025 06:21) (125/101 - 156/84)  BP(mean): --  ABP: --  ABP(mean): --  RR: 20 (15 May 2025 06:21) (19 - 20)  SpO2: 100% (15 May 2025 06:43) (100% - 100%)    O2 Parameters below as of 15 May 2025 06:43  Patient On (Oxygen Delivery Method): ventilator          Mode: AC/ CMV (Assist Control/ Continuous Mandatory Ventilation), RR (machine): 20, TV (machine): 460, FiO2: 30, PEEP: 6, MAP: 11, PIP: 23    05-14 @ 07:01  -  05-15 @ 07:00  --------------------------------------------------------  IN: 2490 mL / OUT: 1900 mL / NET: 590 mL      CAPILLARY BLOOD GLUCOSE          HOSPITAL MEDICATIONS:  MEDICATIONS  (STANDING):  albuterol/ipratropium for Nebulization 3 milliLiter(s) Nebulizer every 6 hours  amLODIPine   Tablet 5 milliGRAM(s) Oral daily  Biotene Dry Mouth Oral Rinse 15 milliLiter(s) Swish and Spit every 8 hours  chlorhexidine 2% Cloths 1 Application(s) Topical daily  cinacalcet suspension 30 milliGRAM(s) Oral every 12 hours  clonazePAM  Tablet 2 milliGRAM(s) Oral every 8 hours  heparin   Injectable 5000 Unit(s) SubCutaneous every 12 hours  influenza   Vaccine 0.5 milliLiter(s) IntraMuscular once  levETIRAcetam  Solution 500 milliGRAM(s) Oral two times a day  pantoprazole   Suspension 40 milliGRAM(s) Oral every 12 hours    MEDICATIONS  (PRN):  acetaminophen   Oral Liquid .. 650 milliGRAM(s) Oral every 6 hours PRN Temp greater or equal to 38C (100.4F), Mild Pain (1 - 3)  HYDROmorphone  Injectable 0.5 milliGRAM(s) IV Push every 4 hours PRN dysynchronny      PHYSICAL EXAMINATION  General: NAD   HEENT: Mouth closed with tongue biting. Tracheostomy present.   Cards: S1/S2, no murmurs   Pulm: Course vent sounds bilaterally. No wheezes.   Abdomen: Soft, nondistended and nontender. PEG (+)   Extremities: No pedal edema. No active SABINO of BL upper and lower extremities.  Neurology: Eyes closed, does not follow commands, and no acute focal neurological deficits     LABS:                 PAST MEDICAL & SURGICAL HISTORY:  Sickle cell anemia      Gout      Deep vein thrombosis (DVT)      Iron overload      Hypertension      History of cholecystectomy      History of removal of Port-a-Cath          FAMILY HISTORY:  Family history of sickle cell trait (Father, Mother)    Patient's father is  (Father)    Patient's mother is  (Mother)        Social History:  Lives alone (15 Mar 2025 09:54)      RADIOLOGY:  [ ] Reviewed and interpreted by me    PULMONARY FUNCTION TESTS:    EKG: RCU PROGRESS NOTE     CHIEF COMPLAINT: Patient is a 45y old  Male who presents with a chief complaint of Referred by Hematologist for low Hg (14 May 2025 07:13)      INTERVAL EVENTS:  - No interval events overnight   - DISPO planning today    REVIEW OF SYSTEMS: Seen by bedside during AM rounds and unable to assess ROS as with HIE    Mode: AC/ CMV (Assist Control/ Continuous Mandatory Ventilation), RR (machine): 20, TV (machine): 460, FiO2: 30, PEEP: 6, MAP: 11, PIP: 23      OBJECTIVE:  ICU Vital Signs Last 24 Hrs  T(C): 37.2 (15 May 2025 06:21), Max: 37.2 (14 May 2025 22:25)  T(F): 98.9 (15 May 2025 06:21), Max: 98.9 (14 May 2025 22:25)  HR: 104 (15 May 2025 06:43) (86 - 108)  BP: 125/101 (15 May 2025 06:21) (125/101 - 156/84)  BP(mean): --  ABP: --  ABP(mean): --  RR: 20 (15 May 2025 06:21) (19 - 20)  SpO2: 100% (15 May 2025 06:43) (100% - 100%)    O2 Parameters below as of 15 May 2025 06:43  Patient On (Oxygen Delivery Method): ventilator          Mode: AC/ CMV (Assist Control/ Continuous Mandatory Ventilation), RR (machine): 20, TV (machine): 460, FiO2: 30, PEEP: 6, MAP: 11, PIP: 23    05-14 @ 07:01  -  05-15 @ 07:00  --------------------------------------------------------  IN: 2490 mL / OUT: 1900 mL / NET: 590 mL      CAPILLARY BLOOD GLUCOSE          HOSPITAL MEDICATIONS:  MEDICATIONS  (STANDING):  albuterol/ipratropium for Nebulization 3 milliLiter(s) Nebulizer every 6 hours  amLODIPine   Tablet 5 milliGRAM(s) Oral daily  Biotene Dry Mouth Oral Rinse 15 milliLiter(s) Swish and Spit every 8 hours  chlorhexidine 2% Cloths 1 Application(s) Topical daily  cinacalcet suspension 30 milliGRAM(s) Oral every 12 hours  clonazePAM  Tablet 2 milliGRAM(s) Oral every 8 hours  heparin   Injectable 5000 Unit(s) SubCutaneous every 12 hours  influenza   Vaccine 0.5 milliLiter(s) IntraMuscular once  levETIRAcetam  Solution 500 milliGRAM(s) Oral two times a day  pantoprazole   Suspension 40 milliGRAM(s) Oral every 12 hours    MEDICATIONS  (PRN):  acetaminophen   Oral Liquid .. 650 milliGRAM(s) Oral every 6 hours PRN Temp greater or equal to 38C (100.4F), Mild Pain (1 - 3)  HYDROmorphone  Injectable 0.5 milliGRAM(s) IV Push every 4 hours PRN dysynchronny      PHYSICAL EXAMINATION  General: NAD   HEENT: Mouth closed with tongue biting. Tracheostomy present.   Cards: S1/S2, no murmurs   Pulm: Course vent sounds bilaterally. No wheezes.   Abdomen: Soft, nondistended and nontender. PEG (+)   Extremities: No pedal edema. No active SABINO of BL upper and lower extremities.  Neurology: Eyes closed, does not follow commands, and no acute focal neurological deficits     LABS:                 PAST MEDICAL & SURGICAL HISTORY:  Sickle cell anemia      Gout      Deep vein thrombosis (DVT)      Iron overload      Hypertension      History of cholecystectomy      History of removal of Port-a-Cath          FAMILY HISTORY:  Family history of sickle cell trait (Father, Mother)    Patient's father is  (Father)    Patient's mother is  (Mother)        Social History:  Lives alone (15 Mar 2025 09:54)      RADIOLOGY:  [ ] Reviewed and interpreted by me    PULMONARY FUNCTION TESTS:    EKG:

## 2025-05-16 NOTE — PROVIDER CONTACT NOTE (OTHER) - NAME OF MD/NP/PA/DO NOTIFIED:
Brandon Delgado
Brandon Phoenix
Srinath Lim
Brandon Delgado
Dory Funez
KENAN ortiz
Sally Duke
Sheila Lobo NP
Raphael Jaramillo
Sally Duke
milton ortiz
Alex Segovia
Dory Funez
KENAN LERMA
May Tan

## 2025-05-16 NOTE — PROGRESS NOTE ADULT - NS ATTEND AMEND GEN_ALL_CORE FT
agree with above  remains stable  awaiting oral appliance  was concern for facial/neck swelling yesterday, but improved after bite block placment  stable for d/c once appliance in place, and CT

## 2025-05-16 NOTE — PROVIDER CONTACT NOTE (OTHER) - RECOMMENDATIONS
Notify provider
Tylenol order
Continue to monitor
KENAN LERMA SAID to straight cath. pt has condom cath and peed through the condom cath KENAN Lerma was aware of not needing to straight cath pt.
contact provider
notify provider
Provider made aware, no new orders at this time.
contact provider
KENAN Nichole rescheduled CAT Scan for tomorrow.
Provider notified
Provider notified
RESident doctor put bite block in pt mouth. KENAN Knapp made aware.
Rhianna IV
contact provider
Provider made aware

## 2025-05-16 NOTE — PROGRESS NOTE ADULT - REASON FOR ADMISSION
Referred by Hematologist for low Hg
[facility] Arrest
Referred by Hematologist for low Hg

## 2025-05-16 NOTE — PROGRESS NOTE ADULT - NS_MD_PANP_GEN_ALL_CORE

## 2025-05-16 NOTE — PROVIDER CONTACT NOTE (OTHER) - DATE AND TIME:
16-Mar-2025 15:31
18-Apr-2025 21:20
23-Apr-2025 06:10
14-May-2025 07:35
17-Mar-2025 08:32
26-Apr-2025 06:00
03-May-2025 06:05
16-May-2025 20:30
30-Apr-2025 07:10
15-May-2025 19:25
22-Apr-2025 04:04
22-Apr-2025 19:50
28-Apr-2025 21:26
16-Mar-2025 05:32
28-Apr-2025 16:10

## 2025-05-16 NOTE — PROVIDER CONTACT NOTE (OTHER) - SITUATION
Hematocrit 20.2
Patient  /87 before morning medication.
Pt . Temp 100.2 axillary.
Pt HR was 142 at 1900,  then Day provider(Mary)  ordered 500 NS bolus; after the bolus pt HR is 133 sustaining.
Pt bladder scan showed 509ml
Hemoglobin 6.9, Hematocrit 21
pt had bite lock reinserted.
Elevated HR of 115 and /92. BP remains high throughout night with no new orders placed
BP of 166/91 (112) and axillary temp of 99.2 at 0400
bite block out of place
pt has axillary temp of 101.4
PT CT scan scheduled.
Pt has fever of 100.5
Pt has a sustaining HR of 130

## 2025-05-16 NOTE — PROGRESS NOTE ADULT - NS ATTEND OPT1 GEN_ALL_CORE
I attest my time as attending is greater than 50% of the total combined time spent on qualifying patient care activities by the PA/NP and attending.

## 2025-05-16 NOTE — PROGRESS NOTE ADULT - NS ATTEND BILL GEN_ALL_CORE
Attending to bill

## 2025-05-16 NOTE — PROVIDER CONTACT NOTE (OTHER) - ASSESSMENT
No S/S of distress. patient at baseline.
PT CAT scan jessica, must go with provider.
Pt bladder scan showed 509ml
pt does nor have bite bllock in mouth.
A&Ox0; Pt has an axillary temperature of 100.5
Temp 101.4, Hr 121, /85, RR 20, Spo2 98
, /92, spo2 98, RR 22, temp 99.6
Pt is resting comfortably in bed.
Temp 99.2, R 26, /81 O2 saturation 98% on ventilator.
VSS, afebrile
Patient is AOx4, no s/s of acute distress.  /87
BP of 166/91 (112) and axillary temp of 99.2 at 0400
T 99.8 axillary, P 133, R 20, 139/89, O2 100%

## 2025-05-17 LAB
CULTURE RESULTS: SIGNIFICANT CHANGE UP
SPECIMEN SOURCE: SIGNIFICANT CHANGE UP

## 2025-06-04 NOTE — PROVIDER CONTACT NOTE (OTHER) - NAME OF MD/NP/PA/DO NOTIFIED:
RENNY rodriguez
Rishi Sung
RENNY Marshall
HA1c done at [facility]  FS DOS  Last dose of Ozempic 5/30/25
RENNY Marshall
RENNY Marshall
Azeb Herman, ACP
Azeb avila, ACP
RENNY rodriguez
ACP provider Patrizia BONILLA)

## 2025-06-26 NOTE — DISCHARGE NOTE NURSING/CASE MANAGEMENT/SOCIAL WORK - NSDCFUADDAPPT_GEN_ALL_CORE_FT
"Anesthesia Transfer of Care Note    Patient: Jamil Cadet    Procedure(s) Performed: Procedure(s) (LRB):  ERCP (ENDOSCOPIC RETROGRADE CHOLANGIOPANCREATOGRAPHY) (N/A)    Patient location: PACU    Anesthesia Type: general    Transport from OR: Transported from OR on room air with adequate spontaneous ventilation    Post pain: adequate analgesia    Post assessment: no apparent anesthetic complications    Post vital signs: stable    Level of consciousness: awake    Nausea/Vomiting: no nausea/vomiting    Complications: none    Transfer of care protocol was followed    Last vitals: Visit Vitals  /62 (BP Location: Left arm, Patient Position: Lying)   Pulse 73   Temp 36.7 °C (98 °F) (Oral)   Resp 16   Ht 5' 11" (1.803 m)   Wt 76.4 kg (168 lb 6.4 oz)   SpO2 97%   BMI 23.49 kg/m²     "
Follow up with Dr. Jordan of [facility] in 1-2 weeks  Follow up with Dr. Valadez (infectious disease) in 1-2 weeks  Follow up with urology Dr. Currie in 1-2 weeks for left renal pelvic mass***  Follow up with hepatology (ask specifically for a liver doctor) in 1-2 weeks for elevated bilirubin level; you will need an MRI of the liver to detect iron measurements

## (undated) DEVICE — SUT PROLENE 1 30" CT-1

## (undated) DEVICE — SOL IRR POUR H2O 1500ML

## (undated) DEVICE — DRAPE TOWEL BLUE 17" X 24"

## (undated) DEVICE — DRAPE WARMING SOLUTION 44 X 44"

## (undated) DEVICE — UNDERPAD LINEN SAVER 17 X 24"

## (undated) DEVICE — SUT SILK 3-0 18" SH (POP-OFF)

## (undated) DEVICE — POSITIONER STRAP ARMBOARD VELCRO TS-30

## (undated) DEVICE — DRSG TELFA 3 X 8

## (undated) DEVICE — STAPLER SKIN MULTI DIRECTION W35

## (undated) DEVICE — SUT VICRYL 0 18" TIES UNDYED

## (undated) DEVICE — PACK MAJOR ABDOMINAL WITH LAP

## (undated) DEVICE — BIOPSY FORCEP RADIAL JAW 4 STANDARD WITH NEEDLE

## (undated) DEVICE — DRSG BANDAID 0.75X3"

## (undated) DEVICE — LAP PAD W RING 18 X 18"

## (undated) DEVICE — GOWN LG

## (undated) DEVICE — POOLE SUCTION TIP

## (undated) DEVICE — DRAPE 3/4 SHEET 52X76"

## (undated) DEVICE — DRSG TEGADERM 4 X 4.75"

## (undated) DEVICE — LABELS BLANK W PEN

## (undated) DEVICE — DRSG MEDIPORE + PAD 3.5X10"

## (undated) DEVICE — LUBRICATING JELLY HR ONE SHOT 3G

## (undated) DEVICE — FOLEY TRAY 16FR 5CC LF UMETER CLOSED

## (undated) DEVICE — BIOPSY FORCEP COLD DISP

## (undated) DEVICE — PACK IV START WITH CHG

## (undated) DEVICE — TUBING MEDI-VAC W MAXIGRIP CONNECTORS 1/4"X6'

## (undated) DEVICE — SALIVA EJECTOR (BLUE)

## (undated) DEVICE — DRSG 2X2

## (undated) DEVICE — BITE BLOCK ADULT 20 X 27MM (GREEN)

## (undated) DEVICE — TUBING IV SET GRAVITY 3Y 100" MACRO

## (undated) DEVICE — SUT PROLENE 2 60" TP-1

## (undated) DEVICE — DRAPE LAPAROTOMY W VELCRO CORD TABS

## (undated) DEVICE — ELCTR BOVIE TIP BLADE INSULATED 6.5" EDGE

## (undated) DEVICE — DENTURE CUP PINK

## (undated) DEVICE — ELCTR GROUNDING PAD ADULT COVIDIEN

## (undated) DEVICE — CLAMP BX HOT RAD JAW 3

## (undated) DEVICE — DRSG CURITY GAUZE SPONGE 4 X 4" 12-PLY

## (undated) DEVICE — SUT CHROMIC 3-0 27" SH

## (undated) DEVICE — DRSG CURITY GAUZE SPONGE 4 X 4" 12-PLY NON-STERILE

## (undated) DEVICE — ELCTR BOVIE PENCIL SMOKE EVACUATION

## (undated) DEVICE — SUT PDS II 1 48" TP-1

## (undated) DEVICE — SUT VICRYL 2-0 27" SH UNDYED

## (undated) DEVICE — FOLEY HOLDER STATLOCK 2 WAY ADULT

## (undated) DEVICE — VENODYNE/SCD SLEEVE CALF MEDIUM

## (undated) DEVICE — SUT VICRYL PLUS 2-0 18" TIES UNDYED

## (undated) DEVICE — CONTAINER FORMALIN 80ML YELLOW

## (undated) DEVICE — WARMING BLANKET FULL UNDERBODY

## (undated) DEVICE — SOL IRR POUR NS 0.9% 1500ML

## (undated) DEVICE — LIGASURE IMPACT

## (undated) DEVICE — WARMING BLANKET LOWER ADULT

## (undated) DEVICE — Device

## (undated) DEVICE — CATH IV SAFE BC 22G X 1" (BLUE)

## (undated) DEVICE — BASIN EMESIS 10IN GRADUATED MAUVE

## (undated) DEVICE — CATH HEMOSTAT GLD PROBE 7FR 300CM

## (undated) DEVICE — FOLEY CATH 2-WAY 14FR 5CC SILICONE

## (undated) DEVICE — SUT VICRYL 3-0 18" SH UNDYED (POP-OFF)

## (undated) DEVICE — ELCTR ECG CONDUCTIVE ADHESIVE